# Patient Record
Sex: FEMALE | Race: WHITE | NOT HISPANIC OR LATINO | Employment: OTHER | URBAN - NONMETROPOLITAN AREA
[De-identification: names, ages, dates, MRNs, and addresses within clinical notes are randomized per-mention and may not be internally consistent; named-entity substitution may affect disease eponyms.]

---

## 2017-01-30 ENCOUNTER — TRANSCRIBE ORDERS (OUTPATIENT)
Dept: LAB | Facility: HOSPITAL | Age: 57
End: 2017-01-30

## 2017-01-30 ENCOUNTER — LAB (OUTPATIENT)
Dept: LAB | Facility: HOSPITAL | Age: 57
End: 2017-01-30

## 2017-01-30 DIAGNOSIS — N18.6 TYPE 2 DIABETES MELLITUS WITH ESRD (END-STAGE RENAL DISEASE) (HCC): ICD-10-CM

## 2017-01-30 DIAGNOSIS — E11.22 TYPE 2 DIABETES MELLITUS WITH ESRD (END-STAGE RENAL DISEASE) (HCC): ICD-10-CM

## 2017-01-30 DIAGNOSIS — N18.6 TYPE 2 DIABETES MELLITUS WITH ESRD (END-STAGE RENAL DISEASE) (HCC): Primary | ICD-10-CM

## 2017-01-30 DIAGNOSIS — E11.22 TYPE 2 DIABETES MELLITUS WITH ESRD (END-STAGE RENAL DISEASE) (HCC): Primary | ICD-10-CM

## 2017-01-30 LAB
25(OH)D3 SERPL-MCNC: 39 NG/ML (ref 30–100)
ALBUMIN SERPL-MCNC: 4 G/DL (ref 3.5–5)
ALBUMIN/GLOB SERPL: 1 G/DL (ref 1.1–2.5)
ALP SERPL-CCNC: 80 U/L (ref 24–120)
ALT SERPL W P-5'-P-CCNC: 28 U/L (ref 0–54)
ANION GAP SERPL CALCULATED.3IONS-SCNC: 14 MMOL/L (ref 4–13)
AST SERPL-CCNC: 20 U/L (ref 7–45)
AUTO MIXED CELLS #: 0.6 10*3/UL (ref 0.1–2.6)
AUTO MIXED CELLS %: 4.8 % (ref 0.1–24)
BILIRUB SERPL-MCNC: 0.4 MG/DL (ref 0.1–1)
BUN BLD-MCNC: 41 MG/DL (ref 5–21)
BUN/CREAT SERPL: 22.9
CALCIUM SPEC-SCNC: 9.4 MG/DL (ref 8.4–10.4)
CHLORIDE SERPL-SCNC: 103 MMOL/L (ref 98–110)
CHOLEST SERPL-MCNC: 178 MG/DL (ref 130–200)
CO2 SERPL-SCNC: 26 MMOL/L (ref 24–31)
CREAT BLD-MCNC: 1.79 MG/DL (ref 0.5–1.4)
ERYTHROCYTE [DISTWIDTH] IN BLOOD BY AUTOMATED COUNT: 16.1 % (ref 12–15)
GFR SERPL CREATININE-BSD FRML MDRD: 29 ML/MIN/1.73
GLOBULIN UR ELPH-MCNC: 4.1 GM/DL
GLUCOSE BLD-MCNC: 141 MG/DL (ref 70–100)
HBA1C MFR BLD: 5.9 %
HCT VFR BLD AUTO: 33.9 % (ref 37–47)
HDLC SERPL-MCNC: 36 MG/DL
HGB BLD-MCNC: 11.4 G/DL (ref 12–16)
LDLC SERPL CALC-MCNC: 83 MG/DL (ref 0–99)
LDLC/HDLC SERPL: 2.32 {RATIO}
LYMPHOCYTES # BLD AUTO: 2 10*3/MM3 (ref 0.8–7)
LYMPHOCYTES NFR BLD AUTO: 17.4 % (ref 15–45)
MCH RBC QN AUTO: 30.4 PG (ref 28–32)
MCHC RBC AUTO-ENTMCNC: 33.6 G/DL (ref 33–36)
MCV RBC AUTO: 90.4 FL (ref 82–98)
NEUTROPHILS # BLD AUTO: 9 10*3/MM3 (ref 1.5–8.3)
NEUTROPHILS NFR BLD AUTO: 77.8 % (ref 39–78)
PLATELET # BLD AUTO: 231 10*3/MM3 (ref 130–400)
PMV BLD AUTO: 9 FL (ref 6–12)
POTASSIUM BLD-SCNC: 4.1 MMOL/L (ref 3.5–5.3)
PROT SERPL-MCNC: 8.1 G/DL (ref 6.3–8.7)
RBC # BLD AUTO: 3.75 10*6/MM3 (ref 4.2–5.4)
SODIUM BLD-SCNC: 143 MMOL/L (ref 135–145)
TRIGL SERPL-MCNC: 293 MG/DL (ref 0–149)
TSH SERPL DL<=0.05 MIU/L-ACNC: 4.06 MIU/ML (ref 0.47–4.68)
VIT B12 BLD-MCNC: 718 PG/ML (ref 239–931)
VLDLC SERPL-MCNC: 58.6 MG/DL
WBC NRBC COR # BLD: 11.6 10*3/MM3 (ref 4.8–10.8)

## 2017-01-30 PROCEDURE — 36415 COLL VENOUS BLD VENIPUNCTURE: CPT

## 2017-01-30 PROCEDURE — 80053 COMPREHEN METABOLIC PANEL: CPT

## 2017-01-30 PROCEDURE — 82306 VITAMIN D 25 HYDROXY: CPT | Performed by: INTERNAL MEDICINE

## 2017-01-30 PROCEDURE — 84156 ASSAY OF PROTEIN URINE: CPT | Performed by: INTERNAL MEDICINE

## 2017-01-30 PROCEDURE — 85025 COMPLETE CBC W/AUTO DIFF WBC: CPT

## 2017-01-30 PROCEDURE — 83036 HEMOGLOBIN GLYCOSYLATED A1C: CPT

## 2017-01-30 PROCEDURE — 82043 UR ALBUMIN QUANTITATIVE: CPT | Performed by: INTERNAL MEDICINE

## 2017-01-30 PROCEDURE — 82570 ASSAY OF URINE CREATININE: CPT | Performed by: INTERNAL MEDICINE

## 2017-01-30 PROCEDURE — 80061 LIPID PANEL: CPT

## 2017-01-30 PROCEDURE — 84443 ASSAY THYROID STIM HORMONE: CPT | Performed by: INTERNAL MEDICINE

## 2017-01-30 PROCEDURE — 82607 VITAMIN B-12: CPT | Performed by: INTERNAL MEDICINE

## 2017-01-31 LAB
CREAT 24H UR-MCNC: 89.1 MG/DL
CREAT UR-MCNC: 83.8 MG/DL
MICROALB/CRT. RATIO UR: 796.6 MG/G CREAT (ref 0–30)
MICROALBUMIN UR-MCNC: 709.8 UG/ML
PROT UR-MCNC: NORMAL MG/DL (ref 0–13.5)
PROT/CREAT UR: NORMAL MG/G CREA

## 2017-02-06 ENCOUNTER — OFFICE VISIT (OUTPATIENT)
Dept: ENDOCRINOLOGY | Facility: CLINIC | Age: 57
End: 2017-02-06

## 2017-02-06 VITALS
DIASTOLIC BLOOD PRESSURE: 60 MMHG | SYSTOLIC BLOOD PRESSURE: 110 MMHG | WEIGHT: 293 LBS | BODY MASS INDEX: 47.09 KG/M2 | HEIGHT: 66 IN | HEART RATE: 87 BPM

## 2017-02-06 DIAGNOSIS — E11.59 HYPERTENSION ASSOCIATED WITH DIABETES (HCC): ICD-10-CM

## 2017-02-06 DIAGNOSIS — N18.30 TYPE 2 DIABETES MELLITUS WITH STAGE 3 CHRONIC KIDNEY DISEASE, WITH LONG-TERM CURRENT USE OF INSULIN (HCC): Primary | ICD-10-CM

## 2017-02-06 DIAGNOSIS — E11.69 MIXED DIABETIC HYPERLIPIDEMIA ASSOCIATED WITH TYPE 2 DIABETES MELLITUS (HCC): ICD-10-CM

## 2017-02-06 DIAGNOSIS — E78.2 MIXED DIABETIC HYPERLIPIDEMIA ASSOCIATED WITH TYPE 2 DIABETES MELLITUS (HCC): ICD-10-CM

## 2017-02-06 DIAGNOSIS — E11.22 TYPE 2 DIABETES MELLITUS WITH STAGE 3 CHRONIC KIDNEY DISEASE, WITH LONG-TERM CURRENT USE OF INSULIN (HCC): Primary | ICD-10-CM

## 2017-02-06 DIAGNOSIS — E53.8 B12 DEFICIENCY: ICD-10-CM

## 2017-02-06 DIAGNOSIS — Z79.4 TYPE 2 DIABETES MELLITUS WITH STAGE 3 CHRONIC KIDNEY DISEASE, WITH LONG-TERM CURRENT USE OF INSULIN (HCC): Primary | ICD-10-CM

## 2017-02-06 DIAGNOSIS — E03.9 ACQUIRED HYPOTHYROIDISM: ICD-10-CM

## 2017-02-06 DIAGNOSIS — E55.9 VITAMIN D DEFICIENCY: ICD-10-CM

## 2017-02-06 DIAGNOSIS — I15.2 HYPERTENSION ASSOCIATED WITH DIABETES (HCC): ICD-10-CM

## 2017-02-06 LAB — GLUCOSE BLDC GLUCOMTR-MCNC: 124 MG/DL (ref 70–130)

## 2017-02-06 PROCEDURE — 82962 GLUCOSE BLOOD TEST: CPT | Performed by: INTERNAL MEDICINE

## 2017-02-06 PROCEDURE — 99214 OFFICE O/P EST MOD 30 MIN: CPT | Performed by: INTERNAL MEDICINE

## 2017-02-06 RX ORDER — LEVOTHYROXINE SODIUM 0.03 MG/1
25 TABLET ORAL DAILY
Qty: 30 TABLET | Refills: 11 | Status: SHIPPED | OUTPATIENT
Start: 2017-02-06 | End: 2017-02-07

## 2017-02-06 NOTE — PROGRESS NOTES
Maria C Shrestha is a 56 y.o. female who presents for  evaluation of   Chief Complaint   Patient presents with   • Diabetes         Primary Care / Referring Provider  Ole Soliman MD    History of Present Illness  Duration/Timing:  Diabetes mellitus type 2  timing constant    quality controlled    severity high     Severity (Complications/Hospitalizations)  Secondary Microvascular Complications:  Diabetic Nephropathy, No Diabetic Neuropathy      Context  Diabetes Regimen:  Insulin, Compliant with regimen  Blood Glucose Readings  near goal   Diet  counts carbs, eating only 45 g cho per meal   Exercise:  Does not exercise    Associated Signs/Symptoms  Hyperglycemic Symptoms:  No polyuria, No polydipsia, No polyphagia, Weight loss  Hypoglycemic Episodes:  No documented hypoglycemia    Past Medical History   Diagnosis Date   • Acquired hypothyroidism 2/6/2017   • Allergic    • Anxiety    • Arthritis    • Chronic kidney disease    • Depression    • Disease of thyroid gland    • Dyslipidemia    • Elevated cholesterol    • Essential hypertension    • Gallbladder abscess    • Obesity    • Type 2 diabetes mellitus    • Type II diabetes mellitus, uncontrolled      Family History   Problem Relation Age of Onset   • Diabetes Other    • Cancer Mother    • Cancer Father    • Heart disease Father    • Diabetes Father    • Obesity Father    • Stroke Father    • Cancer Maternal Grandmother    • Diabetes Maternal Grandfather    • Cancer Paternal Grandmother    • Diabetes Paternal Grandfather    • Heart disease Paternal Grandfather      Social History   Substance Use Topics   • Smoking status: Never Smoker   • Smokeless tobacco: Never Used   • Alcohol use No         Current Outpatient Prescriptions:   •  allopurinol (ZYLOPRIM) 100 MG tablet, Take 100 mg by mouth 2 (Two) Times a Day., Disp: , Rfl:   •  amLODIPine (NORVASC) 5 MG tablet, Take 5 mg by mouth Daily., Disp: , Rfl:   •  aspirin 81 MG tablet, Take 81 mg by mouth Daily., Disp:  ", Rfl:   •  busPIRone (BUSPAR) 10 MG tablet, Take 10 mg by mouth 2 (Two) Times a Day., Disp: , Rfl:   •  butalbital-acetaminophen-caffeine (FIORICET, ESGIC) -40 MG per tablet, Take 1 tablet by mouth As Needed for headaches., Disp: , Rfl:   •  calcitriol (ROCALTROL) 0.25 MCG capsule, Take 0.25 mcg by mouth 3 (Three) Times a Week., Disp: , Rfl:   •  Calcium Carb-Cholecalciferol (CALCIUM-VITAMIN D3) 600-400 MG-UNIT tablet, Take 1 tablet by mouth 2 (Two) Times a Day., Disp: , Rfl:   •  carvedilol (COREG) 3.125 MG tablet, Take 3.125 mg by mouth 2 (Two) Times a Day., Disp: , Rfl:   •  cetirizine (zyrTEC) 10 MG tablet, Take 10 mg by mouth Daily., Disp: , Rfl:   •  cholecalciferol (VITAMIN D3) 10644 UNITS capsule, Take 50,000 Units by mouth Every 7 (Seven) Days., Disp: , Rfl:   •  citalopram (CeleXA) 20 MG tablet, Take 40 mg by mouth Daily., Disp: , Rfl:   •  Dulaglutide (TRULICITY) 1.5 MG/0.5ML solution pen-injector, Inject 0.5 mL under the skin Every 7 (Seven) Days., Disp: , Rfl:   •  ferrous sulfate 325 (65 FE) MG tablet, Take 1 tablet by mouth Daily With Breakfast., Disp: 60 tablet, Rfl: 3  •  fluticasone (FLONASE) 50 MCG/ACT nasal spray, 1 spray into each nostril As Needed for allergies., Disp: , Rfl:   •  furosemide (LASIX) 40 MG tablet, Take 1 tablet by mouth Daily., Disp: 90 tablet, Rfl: 3  •  insulin regular (HUMULIN R) 500 UNIT/ML CONCENTRATED injection, Inject 15-25 Units under the skin 3 (Three) Times a Day Before Meals., Disp: , Rfl:   •  Insulin Syringe-Needle U-100 31G X 5/16\" 0.3 ML misc, 4 (Four) Times a Day. use as indicated 4 times daily., Disp: , Rfl:   •  lamoTRIgine (LaMICtal) 50 MG tablet dispersible disintegrating tablet, Take 50 mg by mouth Daily., Disp: , Rfl:   •  LANTUS 100 UNIT/ML injection, INJECT 192 UNITS DAILY, Disp: 180 mL, Rfl: 3  •  levothyroxine (LEVOTHROID) 25 MCG tablet, Take 1 tablet by mouth Daily., Disp: 30 tablet, Rfl: 11  •  lisinopril (PRINIVIL,ZESTRIL) 20 MG tablet, Take " 20 mg by mouth 2 (Two) Times a Day., Disp: , Rfl:   •  Multiple Vitamins-Minerals (MULTIVITAMIN ADULT PO), Take 1 tablet by mouth Daily., Disp: , Rfl:   •  potassium gluconate 595 MG tablet tablet, Take 595 mg by mouth 2 (Two) Times a Day., Disp: , Rfl:   •  rosuvastatin (CRESTOR) 10 MG tablet, Take 10 mg by mouth Every Other Day., Disp: , Rfl:   •  sodium bicarbonate 650 MG tablet, Take 650 mg by mouth 3 (Three) Times a Day., Disp: , Rfl:   •  SODIUM FLUORIDE PO, Take  by mouth. sodium fluoride mucous membrane, Disp: , Rfl:     Review of Systems    Review of Systems   Constitutional: Positive for unexpected weight change. Negative for activity change, appetite change, chills, diaphoresis, fatigue and fever.   HENT: Negative for congestion, drooling, ear discharge, ear pain, facial swelling, mouth sores, nosebleeds, postnasal drip, sinus pressure, sneezing, sore throat, tinnitus, trouble swallowing and voice change.    Eyes: Negative.  Negative for photophobia, pain, discharge, redness and itching.   Respiratory: Negative.  Negative for apnea, cough, choking, chest tightness, shortness of breath, wheezing and stridor.    Cardiovascular: Negative.  Negative for chest pain, palpitations and leg swelling.   Gastrointestinal: Negative.  Negative for abdominal distention, abdominal pain, constipation, diarrhea, nausea and vomiting.   Endocrine: Positive for polydipsia, polyphagia and polyuria. Negative for cold intolerance and heat intolerance.   Genitourinary: Negative for difficulty urinating, dysuria, flank pain and frequency.   Musculoskeletal: Positive for arthralgias, back pain and myalgias. Negative for gait problem, joint swelling, neck pain and neck stiffness.   Skin: Negative for color change, pallor, rash and wound.   Allergic/Immunologic: Negative for environmental allergies, food allergies and immunocompromised state.   Neurological: Negative for dizziness, tremors, seizures, syncope, facial asymmetry,  "speech difficulty, weakness, light-headedness, numbness and headaches.   Hematological: Negative for adenopathy. Does not bruise/bleed easily.   Psychiatric/Behavioral: Negative for agitation, behavioral problems, confusion, decreased concentration, dysphoric mood, hallucinations, self-injury, sleep disturbance and suicidal ideas. The patient is not nervous/anxious and is not hyperactive.         Objective:     Visit Vitals   • /60   • Pulse 87   • Ht 66\" (167.6 cm)   • Wt (!) 418 lb 1.6 oz (190 kg)   • LMP  (LMP Unknown)   • BMI 67.48 kg/m2       Physical Exam   Constitutional: She is oriented to person, place, and time. She appears well-developed.   HENT:   Head: Normocephalic.   Right Ear: External ear normal.   Left Ear: External ear normal.   Nose: Nose normal.   Eyes: Conjunctivae and EOM are normal. No scleral icterus.   Neck: Normal range of motion. Neck supple. No tracheal deviation present. No thyromegaly present.   Cardiovascular: Normal rate, regular rhythm, normal heart sounds and intact distal pulses.  Exam reveals no gallop and no friction rub.    No murmur heard.  Pulmonary/Chest: Effort normal and breath sounds normal. No stridor. No respiratory distress. She has no wheezes. She has no rales. She exhibits no tenderness.   Abdominal: Soft. Bowel sounds are normal. She exhibits no distension and no mass. There is no tenderness. There is no rebound and no guarding.   Musculoskeletal: Normal range of motion. She exhibits no tenderness or deformity.   Lymphadenopathy:     She has no cervical adenopathy.   Neurological: She is alert and oriented to person, place, and time. She displays normal reflexes. She exhibits normal muscle tone. Coordination normal.   Skin: No rash noted. No erythema. No pallor.   Psychiatric: She has a normal mood and affect. Her behavior is normal. Judgment and thought content normal.       Lab Review    Results for orders placed or performed in visit on 02/06/17   POC " Glucose Fingerstick   Result Value Ref Range    Glucose 124 70 - 130 mg/dL           Assessment/Plan       ICD-10-CM ICD-9-CM   1. Type 2 diabetes mellitus with stage 3 chronic kidney disease, with long-term current use of insulin E11.22 250.40    N18.3 585.3    Z79.4 V58.67   2. Mixed diabetic hyperlipidemia associated with type 2 diabetes mellitus E11.69 250.80    E78.2 272.2   3. Hypertension associated with diabetes E11.59 250.80    I10 401.9   4. Vitamin D deficiency E55.9 268.9   5. B12 deficiency E53.8 266.2   6. Acquired hypothyroidism E03.9 244.9       Glycemic Management:   Lab Results   Component Value Date    HGBA1C 5.9 01/30/2017    HGBA1C 6.0 10/19/2016    HGBA1C 6.0 07/14/2016     Lab Results   Component Value Date    GLUCOSE 141 (H) 01/30/2017    BUN 41 (H) 01/30/2017    CREATININE 1.79 (H) 01/30/2017    EGFRIFNONA 29 (L) 01/30/2017    BCR 22.9 01/30/2017    CO2 26.0 01/30/2017    CALCIUM 9.4 01/30/2017    ALBUMIN 4.00 01/30/2017    LABIL2 1.0 (L) 01/30/2017    AST 20 01/30/2017    ALT 28 01/30/2017     Lab Results   Component Value Date    WBC 11.60 (H) 01/30/2017    HGB 11.4 (L) 01/30/2017    HCT 33.9 (L) 01/30/2017    MCV 90.4 01/30/2017     01/30/2017       Trulicity 1.5 mg weekly       Humulin U 500     for bkfast 0.18-20   For lunch and supper 0.08 to 0.15     May take lantus up to 80 u [facility]      Lipid Management    Lab Results   Component Value Date    TRIG 293 (H) 01/30/2017    TRIG 225 (H) 10/19/2016    TRIG 266 (H) 07/14/2016     Lab Results   Component Value Date    HDL 36 (L) 01/30/2017    HDL 38 (L) 10/19/2016    HDL 39 07/14/2016     Lab Results   Component Value Date    LDLCALC 83 01/30/2017    LDLCALC 80 10/19/2016     Lab Results   Component Value Date    LDL 79 07/14/2016    LDL 90 04/14/2016       on Crestor 20 mg daily       Blood Pressure Management:    Vitals:    02/06/17 1705   BP: 110/60   Pulse:          Lisinopril 20 mg twice daily   Amlodipine 5 mg qd  Coreg 3.125 mg  bid   Lasix 40 mg daily     If she exerts bp rises    When she walked in 150-100  5 minutes later 110-60         Microvascular Complication Monitoring:  No Microalbuminuria, No Diabetic Retinopathy, Date of last eye exam 07/18/2016, No Diabetic Neuropathy  on ACE i     ckd stage III-IV    followed by nephrology     --    eye exam in Dec 15 no            Immunizations:  Last pneumococcal immunization pneumovax before age 65     Flu Shot will have in Mid Nov 2016       Preventive Care:  No smoking      Weight Related:   Wt Readings from Last 3 Encounters:   02/06/17 (!) 418 lb 1.6 oz (190 kg)   12/12/16 (!) 416 lb 9.6 oz (189 kg)   12/05/16 (!) 417 lb 12.8 oz (190 kg)     Body mass index is 67.48 kg/(m^2).      prefers no bariatric surgery    Now on cpap       Bone Health    Lab Results   Component Value Date    CALCIUM 9.4 01/30/2017     On rocatrol per nephro for JARETH       Thyroid Health  Lab Results   Component Value Date    TSH 4.060 01/30/2017     On levothyroxine 25 µg daily , increase to 50 ug daily    Other Diabetes Related Aspects       Lab Results   Component Value Date    GRSCGAPQ96 718 01/30/2017           I reviewed and summarized records from Ole Soliman MD from 2016 and I reviewed / ordered labs.     Orders Placed This Encounter   Procedures   • CBC Auto Differential   • Comprehensive Metabolic Panel   • Hemoglobin A1c   • Lipid Panel   • Microalbumin / Creatinine Urine Ratio   • TSH   • Vitamin B12   • Vitamin D 25 Hydroxy   • POC Glucose Fingerstick         A copy of my note was sent to Ole Soliman MD    Please see my above opinion and suggestions.

## 2017-02-07 RX ORDER — LEVOTHYROXINE SODIUM 0.05 MG/1
50 TABLET ORAL DAILY
Qty: 90 TABLET | Refills: 3 | Status: SHIPPED | OUTPATIENT
Start: 2017-02-07 | End: 2018-03-03 | Stop reason: SDUPTHER

## 2017-02-07 RX ORDER — CALCIUM CARB/VITAMIN D3/VIT K1 500-100-40
TABLET,CHEWABLE ORAL
Qty: 120 EACH | Refills: 11 | Status: SHIPPED | OUTPATIENT
Start: 2017-02-07 | End: 2020-01-23

## 2017-02-07 RX ORDER — INSULIN GLARGINE 100 [IU]/ML
INJECTION, SOLUTION SUBCUTANEOUS
Qty: 9 EACH | Refills: 3 | Status: SHIPPED | OUTPATIENT
Start: 2017-02-07 | End: 2017-10-04 | Stop reason: SDUPTHER

## 2017-02-07 RX ORDER — BLOOD SUGAR DIAGNOSTIC
STRIP MISCELLANEOUS
Qty: 120 EACH | Refills: 11 | Status: SHIPPED | OUTPATIENT
Start: 2017-02-07 | End: 2018-05-23 | Stop reason: SDUPTHER

## 2017-02-07 RX ORDER — BLOOD-GLUCOSE METER
KIT MISCELLANEOUS
Qty: 120 EACH | Refills: 11 | Status: SHIPPED | OUTPATIENT
Start: 2017-02-07 | End: 2018-06-13 | Stop reason: SDUPTHER

## 2017-02-07 RX ORDER — GLUCOSAMINE HCL/CHONDROITIN SU 500-400 MG
CAPSULE ORAL
Qty: 120 EACH | Refills: 11 | Status: SHIPPED | OUTPATIENT
Start: 2017-02-07 | End: 2018-03-15 | Stop reason: SDUPTHER

## 2017-02-07 RX ORDER — INSULIN GLARGINE 100 [IU]/ML
INJECTION, SOLUTION SUBCUTANEOUS
Qty: 9 EACH | Refills: 3 | Status: SHIPPED | OUTPATIENT
Start: 2017-02-07 | End: 2017-02-07 | Stop reason: SDUPTHER

## 2017-02-07 RX ORDER — LEVOTHYROXINE SODIUM 0.05 MG/1
50 TABLET ORAL DAILY
Qty: 90 TABLET | Refills: 3 | Status: SHIPPED | OUTPATIENT
Start: 2017-02-07 | End: 2017-02-07 | Stop reason: SDUPTHER

## 2017-02-22 ENCOUNTER — OFFICE VISIT (OUTPATIENT)
Dept: GASTROENTEROLOGY | Facility: CLINIC | Age: 57
End: 2017-02-22

## 2017-02-22 VITALS
SYSTOLIC BLOOD PRESSURE: 124 MMHG | HEART RATE: 88 BPM | OXYGEN SATURATION: 99 % | WEIGHT: 293 LBS | DIASTOLIC BLOOD PRESSURE: 60 MMHG | HEIGHT: 66 IN | BODY MASS INDEX: 47.09 KG/M2 | TEMPERATURE: 99.1 F

## 2017-02-22 DIAGNOSIS — Z79.4 CONTROLLED TYPE 2 DIABETES MELLITUS WITH COMPLICATION, WITH LONG-TERM CURRENT USE OF INSULIN (HCC): ICD-10-CM

## 2017-02-22 DIAGNOSIS — K62.5 BRBPR (BRIGHT RED BLOOD PER RECTUM): Primary | ICD-10-CM

## 2017-02-22 DIAGNOSIS — E11.8 CONTROLLED TYPE 2 DIABETES MELLITUS WITH COMPLICATION, WITH LONG-TERM CURRENT USE OF INSULIN (HCC): ICD-10-CM

## 2017-02-22 DIAGNOSIS — I10 ESSENTIAL HYPERTENSION: ICD-10-CM

## 2017-02-22 DIAGNOSIS — Z80.0 FAMILY HX OF COLON CANCER: ICD-10-CM

## 2017-02-22 DIAGNOSIS — Z86.010 HX OF COLONIC POLYP: ICD-10-CM

## 2017-02-22 PROCEDURE — 99214 OFFICE O/P EST MOD 30 MIN: CPT | Performed by: NURSE PRACTITIONER

## 2017-02-22 NOTE — PROGRESS NOTES
[facility] GASTROENTEROLOGY - OFFICE NOTE    2/22/2017    Maria C Shrestha   1960    Chief Complaint   Patient presents with   • Hematochezia     chronic    pcp dr nguyen       HISTORY OF PRESENT ILLNESS    Maria C Shrestha is a 56 y.o. female presents  with bright blood per rectum.  This is chronic.  This is been going on for several years.  This occurs occasionally.  The blood was a small amount.  She complains of history of hemorrhoids.  She is moving her bowels once to twice a day she has been found to be iron deficient anemia over the last 2 years.  States her hemoglobin usually runs anywhere from 9-10 has been on oral iron over the last 3-4 months she does see a hematologist but cannot think of his name at this time.  Her hematologist is here [place].  I do see a future appointment with Dr. Melo  for March 2017.  She does have history of stage III kidney disease as well and is followed by Dr. Davis.  Her last colonoscopy was January 2014 for history of polyps and intermittent bright red blood per rectum.  She had 4 polyps, and apparent inflamed anal papilla.  It was suspected that her anal papilla and hemorrhoids were the source of her intermittent bright red blood per rectum.  Recommendation 3 years for repeat anoscopy.      Past Medical History   Diagnosis Date   • Acquired hypothyroidism 2/6/2017   • Allergic    • Anemia    • Anxiety    • Arthritis    • Chronic kidney disease      3rd stage   • Depression    • Disease of thyroid gland    • Dyslipidemia    • Elevated cholesterol    • Essential hypertension    • Gallbladder abscess    • Obesity    • Type 2 diabetes mellitus    • Type II diabetes mellitus, uncontrolled        Past Surgical History   Procedure Laterality Date   • Cholecystectomy     • Tonsillectomy     • Adenoidectomy     • Colonoscopy  01/02/2014   • Dilatation and curettage       X 2   • Endoscopy         Outpatient Prescriptions Marked as Taking for the 2/22/17 encounter (Office Visit)  "with FLOR Back   Medication Sig Dispense Refill   • allopurinol (ZYLOPRIM) 100 MG tablet Take 100 mg by mouth 2 (Two) Times a Day.     • amLODIPine (NORVASC) 5 MG tablet Take 5 mg by mouth Daily.     • aspirin 81 MG tablet Take 81 mg by mouth Daily.     • B-D ULTRA-FINE 33 LANCETS misc Use 4 x daily 120 each 11   • busPIRone (BUSPAR) 10 MG tablet Take 10 mg by mouth 2 (Two) Times a Day.     • butalbital-acetaminophen-caffeine (FIORICET, ESGIC) -40 MG per tablet Take 1 tablet by mouth As Needed for headaches.     • calcitriol (ROCALTROL) 0.25 MCG capsule Take 0.25 mcg by mouth 3 (Three) Times a Week.     • Calcium Carb-Cholecalciferol (CALCIUM-VITAMIN D3) 600-400 MG-UNIT tablet Take 1 tablet by mouth 2 (Two) Times a Day.     • carvedilol (COREG) 3.125 MG tablet Take 3.125 mg by mouth 2 (Two) Times a Day.     • cetirizine (zyrTEC) 10 MG tablet Take 10 mg by mouth As Needed.     • cholecalciferol (VITAMIN D3) 11923 UNITS capsule Take 50,000 Units by mouth Every 7 (Seven) Days.     • citalopram (CeleXA) 20 MG tablet Take 40 mg by mouth Daily.     • Dulaglutide (TRULICITY) 1.5 MG/0.5ML solution pen-injector Inject 1.5 mg under the skin Every 7 (Seven) Days. 12 pen 3   • ferrous sulfate 325 (65 FE) MG tablet Take 1 tablet by mouth Daily With Breakfast. 60 tablet 3   • furosemide (LASIX) 40 MG tablet Take 1 tablet by mouth Daily. 90 tablet 3   • Glucose Blood (BLOOD GLUCOSE TEST) strip Use 4 x daily, use any brand covered by insurance or same brand as before 120 each 11   • insulin glargine (LANTUS) 100 UNIT/ML injection Inject up to 100 u daily 9 each 3   • insulin regular (HUMULIN R) 500 UNIT/ML CONCENTRATED injection Use up to 0.30 ml four times daily 5 vial 3   • Insulin Syringe 31G X 5/16\" 1 ML misc 4 x daily 120 each 11   • Insulin Syringe-Needle U-100 31G X 5/16\" 0.3 ML misc use as indicated 4 times daily. 120 each 11   • lamoTRIgine (LaMICtal) 50 MG tablet dispersible disintegrating tablet Take 50 " mg by mouth Daily.     • levothyroxine (SYNTHROID) 50 MCG tablet Take 1 tablet by mouth Daily. 90 tablet 3   • lisinopril (PRINIVIL,ZESTRIL) 20 MG tablet Take 20 mg by mouth 2 (Two) Times a Day.     • Multiple Vitamins-Minerals (MULTIVITAMIN ADULT PO) Take 1 tablet by mouth Daily.     • potassium gluconate 595 MG tablet tablet Take 595 mg by mouth 2 (Two) Times a Day.     • rosuvastatin (CRESTOR) 10 MG tablet Take 10 mg by mouth Every Other Day.     • sodium bicarbonate 650 MG tablet Take 650 mg by mouth 3 (Three) Times a Day.         Allergies   Allergen Reactions   • Codeine Nausea Only       Social History     Social History   • Marital status:      Spouse name: N/A   • Number of children: N/A   • Years of education: N/A     Occupational History   • Not on file.     Social History Main Topics   • Smoking status: Never Smoker   • Smokeless tobacco: Never Used   • Alcohol use No   • Drug use: No   • Sexual activity: Not on file     Other Topics Concern   • Not on file     Social History Narrative       Family History   Problem Relation Age of Onset   • Diabetes Other    • Cancer Mother    • Cancer Father    • Heart disease Father    • Diabetes Father    • Obesity Father    • Stroke Father    • Cancer Maternal Grandmother    • Diabetes Maternal Grandfather    • Cancer Paternal Grandmother    • Colon cancer Paternal Grandmother    • Diabetes Paternal Grandfather    • Heart disease Paternal Grandfather        Review of Systems   Constitutional: Negative for appetite change, chills, fatigue, fever and unexpected weight change.   HENT: Negative for sore throat and trouble swallowing.    Eyes: Negative for pain and visual disturbance.   Respiratory: Positive for shortness of breath (occasional). Negative for cough, chest tightness and wheezing.    Cardiovascular: Negative for chest pain and palpitations.   Gastrointestinal: Negative for abdominal distention, abdominal pain, anal bleeding, blood in stool,  "constipation, diarrhea, nausea, rectal pain and vomiting.        As mentioned in hpi   Endocrine: Negative for cold intolerance and heat intolerance.   Genitourinary: Negative for difficulty urinating, dysuria and hematuria.   Musculoskeletal: Negative for arthralgias and back pain.   Skin: Negative for color change and rash.   Neurological: Positive for headaches. Negative for dizziness, tremors, seizures, syncope and light-headedness.   Hematological: Negative for adenopathy. Does not bruise/bleed easily.   Psychiatric/Behavioral: Negative for confusion. The patient is not nervous/anxious.        Vitals:    02/22/17 1035   BP: 124/60   BP Location: Left arm   Patient Position: Sitting   Cuff Size: Large Adult   Pulse: 88   Temp: 99.1 °F (37.3 °C)   SpO2: 99%   Weight: (!) 403 lb (183 kg)   Height: 65.5\" (166.4 cm)      Body mass index is 66.04 kg/(m^2).    Physical Exam   Constitutional: She is oriented to person, place, and time. She appears well-developed and well-nourished. No distress.   HENT:   Head: Normocephalic and atraumatic.   Mouth/Throat: Oropharynx is clear and moist.   Eyes: Pupils are equal, round, and reactive to light. No scleral icterus.   Neck: Normal range of motion. Neck supple. No JVD present. No tracheal deviation present.   Cardiovascular: Normal rate, regular rhythm, normal heart sounds and intact distal pulses.  Exam reveals no gallop and no friction rub.    No murmur heard.  Pulmonary/Chest: Effort normal and breath sounds normal. No stridor. No respiratory distress. She has no wheezes. She has no rales.   Abdominal: Soft. Bowel sounds are normal. She exhibits no distension and no mass. There is no tenderness. There is no rebound and no guarding.   Musculoskeletal: Normal range of motion. She exhibits no edema or deformity.   Neurological: She is alert and oriented to person, place, and time. No cranial nerve deficit.   Skin: Skin is warm and dry. No rash noted.   Psychiatric: She has a " normal mood and affect. Her behavior is normal.   Vitals reviewed.      Results for orders placed or performed in visit on 02/06/17   POC Glucose Fingerstick   Result Value Ref Range    Glucose 124 70 - 130 mg/dL           ASSESSMENT AND PLAN    Maria C was seen today for hematochezia.    Diagnoses and all orders for this visit:    BRBPR (bright red blood per rectum)  -     Case request    Hx of colonic polyp  -     Case request    Family hx of colon cancer  -     Case request    Essential hypertension    Controlled type 2 diabetes mellitus with complication, with long-term current use of insulin  Comments:  has kidney disease due to diabetes.     Other orders  -     polyethylene glycol (GOLYTELY) 236 G solution; Take 4,000 mL by mouth 1 (One) Time for 1 dose. Take as directed per instruction sheet.     in regards to bright red blood per rectum, differential diagnosis discussed.  This is chronic.  Recommend ER if worsening symptoms.  Plan for colonoscopy.  Recommend to take blood pressure medication a.m. of procedure if that is when she normally takes.  Also instructed on how to adjust diabetes medications.      COLONOSCOPY WITH ANESTHESIA (N/A) All risks, benefits, alternatives, and indications of colonoscopy procedure have been discussed with the patient. Risks to include perforation of the colon requiring possible surgery or colostomy, risk of bleeding from biopsies or removal of colon tissue, possibility of missing a colon polyp or cancer, or adverse drug reaction.  Benefits to include the diagnosis and management of disease of the colon and rectum. Alternatives to include barium enema, radiographic evaluation, lab testing or no intervention. Pt verbalizes understanding and agrees.         Body mass index is 66.04 kg/(m^2).       FLOR Schneider    EMR Dragon/transcription disclaimer:  Much of this encounter note is electronic transcription/translation of spoken language to printed text.  The electronic  translation of spoken language may be erroneous, or at times, nonsensical words or phrases may be inadvertently transcribed.  Although I have reviewed the note for such errors, some may still exist.    Results for orders placed or performed in visit on 02/06/17   POC Glucose Fingerstick   Result Value Ref Range    Glucose 124 70 - 130 mg/dL

## 2017-02-26 ENCOUNTER — HOSPITAL ENCOUNTER (INPATIENT)
Age: 57
LOS: 3 days | Discharge: HOME | DRG: 683 | End: 2017-03-01
Attending: EMERGENCY MEDICINE | Admitting: HOSPITALIST
Payer: MEDICARE

## 2017-02-26 ENCOUNTER — APPOINTMENT (OUTPATIENT)
Dept: GENERAL RADIOLOGY | Age: 57
DRG: 683 | End: 2017-02-26
Payer: MEDICARE

## 2017-02-26 DIAGNOSIS — N18.9 ACUTE ON CHRONIC RENAL FAILURE (HCC): Primary | ICD-10-CM

## 2017-02-26 DIAGNOSIS — R11.11 NON-INTRACTABLE VOMITING WITHOUT NAUSEA, UNSPECIFIED VOMITING TYPE: ICD-10-CM

## 2017-02-26 DIAGNOSIS — N17.9 ACUTE ON CHRONIC RENAL FAILURE (HCC): Primary | ICD-10-CM

## 2017-02-26 DIAGNOSIS — E86.0 DEHYDRATION: ICD-10-CM

## 2017-02-26 PROBLEM — I10 HYPERTENSION: Status: ACTIVE | Noted: 2017-02-26

## 2017-02-26 PROBLEM — N18.30 ACUTE RENAL FAILURE SUPERIMPOSED ON STAGE 3 CHRONIC KIDNEY DISEASE (HCC): Status: ACTIVE | Noted: 2017-02-26

## 2017-02-26 PROBLEM — D64.9 ANEMIA: Status: ACTIVE | Noted: 2017-02-26

## 2017-02-26 PROBLEM — E78.5 HYPERLIPIDEMIA: Status: ACTIVE | Noted: 2017-02-26

## 2017-02-26 PROBLEM — E83.51 HYPOCALCEMIA: Status: ACTIVE | Noted: 2017-02-26

## 2017-02-26 PROBLEM — E11.9 TYPE II OR UNSPECIFIED TYPE DIABETES MELLITUS WITHOUT MENTION OF COMPLICATION, NOT STATED AS UNCONTROLLED: Status: ACTIVE | Noted: 2017-02-26

## 2017-02-26 PROBLEM — G47.30 SLEEP APNEA: Status: ACTIVE | Noted: 2017-02-26

## 2017-02-26 PROBLEM — E07.9 THYROID DISEASE: Status: ACTIVE | Noted: 2017-02-26

## 2017-02-26 PROBLEM — F32.A DEPRESSION: Status: ACTIVE | Noted: 2017-02-26

## 2017-02-26 PROBLEM — E11.22 TYPE 2 DM WITH CKD STAGE 3 AND HYPERTENSION (HCC): Status: ACTIVE | Noted: 2017-02-26

## 2017-02-26 PROBLEM — E55.9 VITAMIN D DEFICIENCY: Status: ACTIVE | Noted: 2017-02-26

## 2017-02-26 PROBLEM — I12.9 TYPE 2 DM WITH CKD STAGE 3 AND HYPERTENSION (HCC): Status: ACTIVE | Noted: 2017-02-26

## 2017-02-26 PROBLEM — F41.9 ANXIETY: Status: ACTIVE | Noted: 2017-02-26

## 2017-02-26 PROBLEM — N18.30 TYPE 2 DM WITH CKD STAGE 3 AND HYPERTENSION (HCC): Status: ACTIVE | Noted: 2017-02-26

## 2017-02-26 LAB
ALBUMIN SERPL-MCNC: 4.1 G/DL (ref 3.5–5.2)
ALP BLD-CCNC: 78 U/L (ref 35–104)
ALT SERPL-CCNC: 21 U/L (ref 5–33)
ANION GAP SERPL CALCULATED.3IONS-SCNC: 18 MMOL/L (ref 7–19)
AST SERPL-CCNC: 19 U/L (ref 5–32)
BILIRUB SERPL-MCNC: <0.2 MG/DL (ref 0.2–1.2)
BILIRUBIN URINE: NEGATIVE
BLOOD, URINE: NEGATIVE
BUN BLDV-MCNC: 77 MG/DL (ref 6–20)
CALCIUM SERPL-MCNC: 8.2 MG/DL (ref 8.6–10)
CHLORIDE BLD-SCNC: 98 MMOL/L (ref 98–111)
CLARITY: CLEAR
CO2: 18 MMOL/L (ref 22–29)
COLOR: YELLOW
CREAT SERPL-MCNC: 3.6 MG/DL (ref 0.5–0.9)
GFR NON-AFRICAN AMERICAN: 13
GLOBULIN: 3.8 G/DL
GLUCOSE BLD-MCNC: 166 MG/DL (ref 74–109)
GLUCOSE BLD-MCNC: 173 MG/DL (ref 70–99)
GLUCOSE BLD-MCNC: 242 MG/DL (ref 70–99)
GLUCOSE URINE: NEGATIVE MG/DL
HCT VFR BLD CALC: 36.2 % (ref 37–47)
HEMOGLOBIN: 11.1 G/DL (ref 12–16)
KETONES, URINE: NEGATIVE MG/DL
LEUKOCYTE ESTERASE, URINE: NEGATIVE
LIPASE: 59 U/L (ref 13–60)
MAGNESIUM: 1.9 MG/DL (ref 1.6–2.6)
MCH RBC QN AUTO: 29.8 PG (ref 27–31)
MCHC RBC AUTO-ENTMCNC: 30.7 G/DL (ref 33–37)
MCV RBC AUTO: 97.3 FL (ref 81–99)
NITRITE, URINE: NEGATIVE
OSMOLALITY URINE: 291 MOSM/KG (ref 250–1200)
OSMOLALITY: 318 MOSM/KG (ref 275–300)
PDW BLD-RTO: 17.4 % (ref 11.5–14.5)
PERFORMED ON: ABNORMAL
PERFORMED ON: ABNORMAL
PERFORMED ON: NORMAL
PH UA: 5
PHOSPHORUS: 4.8 MG/DL (ref 2.5–4.5)
PLATELET # BLD: 239 K/UL (ref 130–400)
PMV BLD AUTO: 10.9 FL (ref 7.4–10.4)
POC TROPONIN I: 0 NG/ML (ref 0–0.08)
POTASSIUM SERPL-SCNC: 4 MMOL/L (ref 3.5–5)
PROTEIN UA: ABNORMAL MG/DL
RBC # BLD: 3.72 M/UL (ref 4.2–5.4)
SODIUM BLD-SCNC: 134 MMOL/L (ref 136–145)
SODIUM URINE: 29 MMOL/L
SPECIFIC GRAVITY UA: 1.01
TOTAL PROTEIN: 7.9 G/DL (ref 6.6–8.7)
UROBILINOGEN, URINE: 0.2 E.U./DL
WBC # BLD: 9.1 K/UL (ref 4.8–10.8)

## 2017-02-26 PROCEDURE — 6360000002 HC RX W HCPCS: Performed by: EMERGENCY MEDICINE

## 2017-02-26 PROCEDURE — S0028 INJECTION, FAMOTIDINE, 20 MG: HCPCS | Performed by: EMERGENCY MEDICINE

## 2017-02-26 PROCEDURE — 99285 EMERGENCY DEPT VISIT HI MDM: CPT

## 2017-02-26 PROCEDURE — 83735 ASSAY OF MAGNESIUM: CPT

## 2017-02-26 PROCEDURE — 74022 RADEX COMPL AQT ABD SERIES: CPT

## 2017-02-26 PROCEDURE — C9113 INJ PANTOPRAZOLE SODIUM, VIA: HCPCS | Performed by: NURSE PRACTITIONER

## 2017-02-26 PROCEDURE — 83690 ASSAY OF LIPASE: CPT

## 2017-02-26 PROCEDURE — 84484 ASSAY OF TROPONIN QUANT: CPT

## 2017-02-26 PROCEDURE — 99223 1ST HOSP IP/OBS HIGH 75: CPT | Performed by: INTERNAL MEDICINE

## 2017-02-26 PROCEDURE — 80053 COMPREHEN METABOLIC PANEL: CPT

## 2017-02-26 PROCEDURE — 2580000003 HC RX 258: Performed by: HOSPITALIST

## 2017-02-26 PROCEDURE — 2580000003 HC RX 258: Performed by: NURSE PRACTITIONER

## 2017-02-26 PROCEDURE — 85027 COMPLETE CBC AUTOMATED: CPT

## 2017-02-26 PROCEDURE — 99284 EMERGENCY DEPT VISIT MOD MDM: CPT | Performed by: EMERGENCY MEDICINE

## 2017-02-26 PROCEDURE — 83930 ASSAY OF BLOOD OSMOLALITY: CPT

## 2017-02-26 PROCEDURE — 81003 URINALYSIS AUTO W/O SCOPE: CPT

## 2017-02-26 PROCEDURE — 6370000000 HC RX 637 (ALT 250 FOR IP): Performed by: NURSE PRACTITIONER

## 2017-02-26 PROCEDURE — 82948 REAGENT STRIP/BLOOD GLUCOSE: CPT

## 2017-02-26 PROCEDURE — 1210000000 HC MED SURG R&B

## 2017-02-26 PROCEDURE — 2500000003 HC RX 250 WO HCPCS: Performed by: EMERGENCY MEDICINE

## 2017-02-26 PROCEDURE — 36415 COLL VENOUS BLD VENIPUNCTURE: CPT

## 2017-02-26 PROCEDURE — 93005 ELECTROCARDIOGRAM TRACING: CPT

## 2017-02-26 PROCEDURE — 83935 ASSAY OF URINE OSMOLALITY: CPT

## 2017-02-26 PROCEDURE — 6360000002 HC RX W HCPCS: Performed by: NURSE PRACTITIONER

## 2017-02-26 PROCEDURE — 84100 ASSAY OF PHOSPHORUS: CPT

## 2017-02-26 PROCEDURE — 84300 ASSAY OF URINE SODIUM: CPT

## 2017-02-26 PROCEDURE — 2580000003 HC RX 258: Performed by: EMERGENCY MEDICINE

## 2017-02-26 RX ORDER — LAMOTRIGINE 25 MG/1
50 TABLET ORAL
Status: DISCONTINUED | OUTPATIENT
Start: 2017-02-26 | End: 2017-03-01 | Stop reason: HOSPADM

## 2017-02-26 RX ORDER — SODIUM BICARBONATE 650 MG/1
650 TABLET ORAL 3 TIMES DAILY
Status: DISCONTINUED | OUTPATIENT
Start: 2017-02-26 | End: 2017-03-01 | Stop reason: HOSPADM

## 2017-02-26 RX ORDER — 0.9 % SODIUM CHLORIDE 0.9 %
1000 INTRAVENOUS SOLUTION INTRAVENOUS ONCE
Status: COMPLETED | OUTPATIENT
Start: 2017-02-26 | End: 2017-02-26

## 2017-02-26 RX ORDER — SODIUM CHLORIDE 9 MG/ML
INJECTION, SOLUTION INTRAVENOUS CONTINUOUS
Status: DISCONTINUED | OUTPATIENT
Start: 2017-02-26 | End: 2017-02-28

## 2017-02-26 RX ORDER — PANTOPRAZOLE SODIUM 40 MG/10ML
40 INJECTION, POWDER, LYOPHILIZED, FOR SOLUTION INTRAVENOUS ONCE
Status: COMPLETED | OUTPATIENT
Start: 2017-02-26 | End: 2017-02-26

## 2017-02-26 RX ORDER — DEXTROSE MONOHYDRATE 50 MG/ML
100 INJECTION, SOLUTION INTRAVENOUS PRN
Status: DISCONTINUED | OUTPATIENT
Start: 2017-02-26 | End: 2017-03-01 | Stop reason: HOSPADM

## 2017-02-26 RX ORDER — ALLOPURINOL 100 MG/1
100 TABLET ORAL 2 TIMES DAILY
COMMUNITY

## 2017-02-26 RX ORDER — FERROUS SULFATE 325(65) MG
325 TABLET ORAL
Status: DISCONTINUED | OUTPATIENT
Start: 2017-02-26 | End: 2017-03-01 | Stop reason: HOSPADM

## 2017-02-26 RX ORDER — LEVOTHYROXINE SODIUM 0.05 MG/1
50 TABLET ORAL DAILY
Status: DISCONTINUED | OUTPATIENT
Start: 2017-02-26 | End: 2017-03-01 | Stop reason: HOSPADM

## 2017-02-26 RX ORDER — ACETAMINOPHEN 325 MG/1
650 TABLET ORAL EVERY 4 HOURS PRN
Status: DISCONTINUED | OUTPATIENT
Start: 2017-02-26 | End: 2017-03-01 | Stop reason: HOSPADM

## 2017-02-26 RX ORDER — FERROUS SULFATE 325(65) MG
325 TABLET ORAL
COMMUNITY
End: 2021-06-16 | Stop reason: ALTCHOICE

## 2017-02-26 RX ORDER — CALCITRIOL 0.25 UG/1
0.25 CAPSULE, LIQUID FILLED ORAL EVERY OTHER DAY
Status: DISCONTINUED | OUTPATIENT
Start: 2017-02-26 | End: 2017-03-01 | Stop reason: HOSPADM

## 2017-02-26 RX ORDER — ASPIRIN 81 MG/1
81 TABLET, CHEWABLE ORAL DAILY
Status: DISCONTINUED | OUTPATIENT
Start: 2017-02-26 | End: 2017-03-01 | Stop reason: HOSPADM

## 2017-02-26 RX ORDER — ONDANSETRON 2 MG/ML
4 INJECTION INTRAMUSCULAR; INTRAVENOUS EVERY 6 HOURS PRN
Status: DISCONTINUED | OUTPATIENT
Start: 2017-02-26 | End: 2017-03-01 | Stop reason: HOSPADM

## 2017-02-26 RX ORDER — INSULIN GLARGINE 100 [IU]/ML
96 INJECTION, SOLUTION SUBCUTANEOUS EVERY EVENING
Status: DISCONTINUED | OUTPATIENT
Start: 2017-02-26 | End: 2017-03-01 | Stop reason: HOSPADM

## 2017-02-26 RX ORDER — CARVEDILOL 6.25 MG/1
3.12 TABLET ORAL 2 TIMES DAILY WITH MEALS
Status: DISCONTINUED | OUTPATIENT
Start: 2017-02-26 | End: 2017-03-01 | Stop reason: HOSPADM

## 2017-02-26 RX ORDER — CALCITRIOL 0.25 UG/1
0.25 CAPSULE, LIQUID FILLED ORAL EVERY OTHER DAY
Status: ON HOLD | COMMUNITY
End: 2021-07-27

## 2017-02-26 RX ORDER — NICOTINE POLACRILEX 4 MG
15 LOZENGE BUCCAL PRN
Status: DISCONTINUED | OUTPATIENT
Start: 2017-02-26 | End: 2017-03-01 | Stop reason: HOSPADM

## 2017-02-26 RX ORDER — MULTIVITAMIN WITH FOLIC ACID 400 MCG
1 TABLET ORAL DAILY
Status: DISCONTINUED | OUTPATIENT
Start: 2017-02-26 | End: 2017-03-01 | Stop reason: HOSPADM

## 2017-02-26 RX ORDER — LEVOTHYROXINE SODIUM 0.05 MG/1
50 TABLET ORAL DAILY
COMMUNITY
End: 2021-06-16 | Stop reason: DRUGHIGH

## 2017-02-26 RX ORDER — AMLODIPINE BESYLATE 5 MG/1
10 TABLET ORAL DAILY
COMMUNITY
End: 2021-07-12

## 2017-02-26 RX ORDER — DEXTROSE MONOHYDRATE 25 G/50ML
12.5 INJECTION, SOLUTION INTRAVENOUS PRN
Status: DISCONTINUED | OUTPATIENT
Start: 2017-02-26 | End: 2017-03-01 | Stop reason: HOSPADM

## 2017-02-26 RX ORDER — SODIUM CHLORIDE 0.9 % (FLUSH) 0.9 %
10 SYRINGE (ML) INJECTION EVERY 12 HOURS SCHEDULED
Status: DISCONTINUED | OUTPATIENT
Start: 2017-02-26 | End: 2017-03-01 | Stop reason: HOSPADM

## 2017-02-26 RX ORDER — ATORVASTATIN CALCIUM 40 MG/1
40 TABLET, FILM COATED ORAL NIGHTLY
Status: DISCONTINUED | OUTPATIENT
Start: 2017-02-26 | End: 2017-03-01 | Stop reason: HOSPADM

## 2017-02-26 RX ORDER — INSULIN GLARGINE 100 [IU]/ML
192 INJECTION, SOLUTION SUBCUTANEOUS EVERY EVENING
Status: DISCONTINUED | OUTPATIENT
Start: 2017-02-26 | End: 2017-02-26 | Stop reason: SDUPTHER

## 2017-02-26 RX ORDER — ERGOCALCIFEROL 1.25 MG/1
50000 CAPSULE ORAL WEEKLY
Status: DISCONTINUED | OUTPATIENT
Start: 2017-02-26 | End: 2017-03-01 | Stop reason: HOSPADM

## 2017-02-26 RX ORDER — ALLOPURINOL 100 MG/1
100 TABLET ORAL 2 TIMES DAILY
Status: DISCONTINUED | OUTPATIENT
Start: 2017-02-26 | End: 2017-03-01 | Stop reason: HOSPADM

## 2017-02-26 RX ORDER — SODIUM CHLORIDE 0.9 % (FLUSH) 0.9 %
10 SYRINGE (ML) INJECTION PRN
Status: DISCONTINUED | OUTPATIENT
Start: 2017-02-26 | End: 2017-03-01 | Stop reason: HOSPADM

## 2017-02-26 RX ORDER — CITALOPRAM 40 MG/1
40 TABLET ORAL DAILY
Status: DISCONTINUED | OUTPATIENT
Start: 2017-02-26 | End: 2017-03-01 | Stop reason: HOSPADM

## 2017-02-26 RX ORDER — ONDANSETRON 2 MG/ML
4 INJECTION INTRAMUSCULAR; INTRAVENOUS ONCE
Status: COMPLETED | OUTPATIENT
Start: 2017-02-26 | End: 2017-02-26

## 2017-02-26 RX ORDER — BUSPIRONE HYDROCHLORIDE 10 MG/1
20 TABLET ORAL 2 TIMES DAILY
Status: DISCONTINUED | OUTPATIENT
Start: 2017-02-26 | End: 2017-03-01 | Stop reason: HOSPADM

## 2017-02-26 RX ORDER — LAMOTRIGINE 100 MG/1
100 TABLET ORAL NIGHTLY
COMMUNITY

## 2017-02-26 RX ADMIN — SODIUM BICARBONATE 650 MG: 650 TABLET, ORALLY DISINTEGRATING ORAL at 15:21

## 2017-02-26 RX ADMIN — BUSPIRONE HYDROCHLORIDE 20 MG: 10 TABLET ORAL at 15:21

## 2017-02-26 RX ADMIN — CALCIUM GLUCONATE 1 G: 94 INJECTION, SOLUTION INTRAVENOUS at 17:50

## 2017-02-26 RX ADMIN — LAMOTRIGINE 50 MG: 25 TABLET ORAL at 17:50

## 2017-02-26 RX ADMIN — ALLOPURINOL 100 MG: 100 TABLET ORAL at 20:31

## 2017-02-26 RX ADMIN — ONDANSETRON 4 MG: 2 INJECTION INTRAMUSCULAR; INTRAVENOUS at 10:52

## 2017-02-26 RX ADMIN — Medication 1 TABLET: at 15:22

## 2017-02-26 RX ADMIN — ASPIRIN 81 MG CHEWABLE TABLET 81 MG: 81 TABLET CHEWABLE at 17:50

## 2017-02-26 RX ADMIN — CITALOPRAM 40 MG: 40 TABLET, FILM COATED ORAL at 15:22

## 2017-02-26 RX ADMIN — ENOXAPARIN SODIUM 40 MG: 40 INJECTION SUBCUTANEOUS at 17:50

## 2017-02-26 RX ADMIN — SODIUM BICARBONATE 650 MG: 650 TABLET, ORALLY DISINTEGRATING ORAL at 20:31

## 2017-02-26 RX ADMIN — SODIUM CHLORIDE: 9 INJECTION, SOLUTION INTRAVENOUS at 15:21

## 2017-02-26 RX ADMIN — INSULIN GLARGINE 96 UNITS: 100 INJECTION, SOLUTION SUBCUTANEOUS at 17:53

## 2017-02-26 RX ADMIN — FERROUS SULFATE TAB 325 MG (65 MG ELEMENTAL FE) 325 MG: 325 (65 FE) TAB at 17:50

## 2017-02-26 RX ADMIN — SODIUM CHLORIDE 1000 ML: 9 INJECTION, SOLUTION INTRAVENOUS at 10:51

## 2017-02-26 RX ADMIN — ALLOPURINOL 100 MG: 100 TABLET ORAL at 15:22

## 2017-02-26 RX ADMIN — ERGOCALCIFEROL 50000 UNITS: 1.25 CAPSULE ORAL at 15:22

## 2017-02-26 RX ADMIN — ATORVASTATIN CALCIUM 40 MG: 40 TABLET, FILM COATED ORAL at 20:31

## 2017-02-26 RX ADMIN — INSULIN GLARGINE 96 UNITS: 100 INJECTION, SOLUTION SUBCUTANEOUS at 17:49

## 2017-02-26 RX ADMIN — LEVOTHYROXINE SODIUM 50 MCG: 50 TABLET ORAL at 15:22

## 2017-02-26 RX ADMIN — THERA TABS 1 TABLET: TAB at 15:21

## 2017-02-26 RX ADMIN — FAMOTIDINE 20 MG: 10 INJECTION, SOLUTION INTRAVENOUS at 10:51

## 2017-02-26 RX ADMIN — CARVEDILOL 3.12 MG: 6.25 TABLET, FILM COATED ORAL at 17:50

## 2017-02-26 RX ADMIN — BUSPIRONE HYDROCHLORIDE 20 MG: 10 TABLET ORAL at 20:31

## 2017-02-26 RX ADMIN — PANTOPRAZOLE SODIUM 40 MG: 40 INJECTION, POWDER, FOR SOLUTION INTRAVENOUS at 15:21

## 2017-02-26 RX ADMIN — Medication 1 TABLET: at 20:31

## 2017-02-26 ASSESSMENT — ENCOUNTER SYMPTOMS
NAUSEA: 1
VOMITING: 1
SHORTNESS OF BREATH: 0
DIARRHEA: 1
ABDOMINAL PAIN: 1
EYE PAIN: 0

## 2017-02-26 ASSESSMENT — PAIN DESCRIPTION - PAIN TYPE: TYPE: ACUTE PAIN

## 2017-02-26 ASSESSMENT — PAIN DESCRIPTION - DESCRIPTORS: DESCRIPTORS: BURNING

## 2017-02-26 ASSESSMENT — PAIN SCALES - GENERAL: PAINLEVEL_OUTOF10: 4

## 2017-02-26 ASSESSMENT — PAIN DESCRIPTION - FREQUENCY: FREQUENCY: CONTINUOUS

## 2017-02-27 LAB
ALBUMIN SERPL-MCNC: 3.3 G/DL (ref 3.5–5.2)
ALP BLD-CCNC: 52 U/L (ref 35–104)
ALT SERPL-CCNC: 14 U/L (ref 5–33)
ANION GAP SERPL CALCULATED.3IONS-SCNC: 16 MMOL/L (ref 7–19)
AST SERPL-CCNC: 12 U/L (ref 5–32)
BASOPHILS ABSOLUTE: 0 K/UL (ref 0–0.2)
BASOPHILS RELATIVE PERCENT: 0.1 % (ref 0–1)
BILIRUB SERPL-MCNC: <0.2 MG/DL (ref 0.2–1.2)
BUN BLDV-MCNC: 74 MG/DL (ref 6–20)
CALCIUM SERPL-MCNC: 7.7 MG/DL (ref 8.6–10)
CHLORIDE BLD-SCNC: 106 MMOL/L (ref 98–111)
CO2: 16 MMOL/L (ref 22–29)
CREAT SERPL-MCNC: 3.3 MG/DL (ref 0.5–0.9)
EOSINOPHILS ABSOLUTE: 0.1 K/UL (ref 0–0.6)
EOSINOPHILS RELATIVE PERCENT: 0.5 % (ref 0–5)
GFR NON-AFRICAN AMERICAN: 14
GLOBULIN: 3 G/DL
GLUCOSE BLD-MCNC: 107 MG/DL (ref 70–99)
GLUCOSE BLD-MCNC: 120 MG/DL (ref 70–99)
GLUCOSE BLD-MCNC: 124 MG/DL (ref 70–99)
GLUCOSE BLD-MCNC: 161 MG/DL (ref 70–99)
GLUCOSE BLD-MCNC: 94 MG/DL (ref 74–109)
HBA1C MFR BLD: 6.1 %
HCT VFR BLD CALC: 29.7 % (ref 37–47)
HEMOGLOBIN: 9.5 G/DL (ref 12–16)
LYMPHOCYTES ABSOLUTE: 1.7 K/UL (ref 1.1–4.5)
LYMPHOCYTES RELATIVE PERCENT: 17.8 % (ref 20–40)
MCH RBC QN AUTO: 29.3 PG (ref 27–31)
MCHC RBC AUTO-ENTMCNC: 32 G/DL (ref 33–37)
MCV RBC AUTO: 91.7 FL (ref 81–99)
MONOCYTES ABSOLUTE: 0.4 K/UL (ref 0–0.9)
MONOCYTES RELATIVE PERCENT: 4.2 % (ref 0–10)
NEUTROPHILS ABSOLUTE: 7.3 K/UL (ref 1.5–7.5)
NEUTROPHILS RELATIVE PERCENT: 76.3 % (ref 50–65)
PDW BLD-RTO: 17.1 % (ref 11.5–14.5)
PERFORMED ON: ABNORMAL
PLATELET # BLD: 229 K/UL (ref 130–400)
PMV BLD AUTO: 10.2 FL (ref 7.4–10.4)
POTASSIUM SERPL-SCNC: 4.2 MMOL/L (ref 3.5–5)
RBC # BLD: 3.24 M/UL (ref 4.2–5.4)
SODIUM BLD-SCNC: 138 MMOL/L (ref 136–145)
TOTAL PROTEIN: 6.3 G/DL (ref 6.6–8.7)
TSH SERPL DL<=0.05 MIU/L-ACNC: 0.7 UIU/ML (ref 0.27–4.2)
WBC # BLD: 9.5 K/UL (ref 4.8–10.8)

## 2017-02-27 PROCEDURE — 82948 REAGENT STRIP/BLOOD GLUCOSE: CPT

## 2017-02-27 PROCEDURE — 99233 SBSQ HOSP IP/OBS HIGH 50: CPT | Performed by: HOSPITALIST

## 2017-02-27 PROCEDURE — 6370000000 HC RX 637 (ALT 250 FOR IP): Performed by: NURSE PRACTITIONER

## 2017-02-27 PROCEDURE — 36415 COLL VENOUS BLD VENIPUNCTURE: CPT

## 2017-02-27 PROCEDURE — 80053 COMPREHEN METABOLIC PANEL: CPT

## 2017-02-27 PROCEDURE — 85025 COMPLETE CBC W/AUTO DIFF WBC: CPT

## 2017-02-27 PROCEDURE — 83036 HEMOGLOBIN GLYCOSYLATED A1C: CPT

## 2017-02-27 PROCEDURE — 2580000003 HC RX 258: Performed by: NURSE PRACTITIONER

## 2017-02-27 PROCEDURE — 1210000000 HC MED SURG R&B

## 2017-02-27 PROCEDURE — 6360000002 HC RX W HCPCS: Performed by: NURSE PRACTITIONER

## 2017-02-27 PROCEDURE — 2580000003 HC RX 258: Performed by: HOSPITALIST

## 2017-02-27 PROCEDURE — 84443 ASSAY THYROID STIM HORMONE: CPT

## 2017-02-27 RX ORDER — DOCUSATE SODIUM 100 MG/1
100 CAPSULE, LIQUID FILLED ORAL DAILY
Status: DISCONTINUED | OUTPATIENT
Start: 2017-02-27 | End: 2017-03-01 | Stop reason: HOSPADM

## 2017-02-27 RX ADMIN — BUSPIRONE HYDROCHLORIDE 20 MG: 10 TABLET ORAL at 09:04

## 2017-02-27 RX ADMIN — BUSPIRONE HYDROCHLORIDE 20 MG: 10 TABLET ORAL at 21:33

## 2017-02-27 RX ADMIN — ENOXAPARIN SODIUM 40 MG: 40 INJECTION SUBCUTANEOUS at 09:04

## 2017-02-27 RX ADMIN — SODIUM BICARBONATE 650 MG: 650 TABLET, ORALLY DISINTEGRATING ORAL at 21:33

## 2017-02-27 RX ADMIN — CARVEDILOL 3.12 MG: 6.25 TABLET, FILM COATED ORAL at 09:01

## 2017-02-27 RX ADMIN — ALLOPURINOL 100 MG: 100 TABLET ORAL at 21:33

## 2017-02-27 RX ADMIN — INSULIN GLARGINE 96 UNITS: 100 INJECTION, SOLUTION SUBCUTANEOUS at 20:05

## 2017-02-27 RX ADMIN — SODIUM BICARBONATE 650 MG: 650 TABLET, ORALLY DISINTEGRATING ORAL at 09:03

## 2017-02-27 RX ADMIN — Medication 198 MG: at 15:00

## 2017-02-27 RX ADMIN — SODIUM BICARBONATE 650 MG: 650 TABLET, ORALLY DISINTEGRATING ORAL at 16:05

## 2017-02-27 RX ADMIN — Medication 1 TABLET: at 21:33

## 2017-02-27 RX ADMIN — FERROUS SULFATE TAB 325 MG (65 MG ELEMENTAL FE) 325 MG: 325 (65 FE) TAB at 18:18

## 2017-02-27 RX ADMIN — CITALOPRAM 40 MG: 40 TABLET, FILM COATED ORAL at 09:03

## 2017-02-27 RX ADMIN — LEVOTHYROXINE SODIUM 50 MCG: 50 TABLET ORAL at 09:03

## 2017-02-27 RX ADMIN — DOCUSATE SODIUM 100 MG: 100 CAPSULE, LIQUID FILLED ORAL at 16:05

## 2017-02-27 RX ADMIN — CARVEDILOL 3.12 MG: 6.25 TABLET, FILM COATED ORAL at 18:16

## 2017-02-27 RX ADMIN — SODIUM CHLORIDE: 9 INJECTION, SOLUTION INTRAVENOUS at 00:53

## 2017-02-27 RX ADMIN — FERROUS SULFATE TAB 325 MG (65 MG ELEMENTAL FE) 325 MG: 325 (65 FE) TAB at 09:04

## 2017-02-27 RX ADMIN — LAMOTRIGINE 50 MG: 25 TABLET ORAL at 18:17

## 2017-02-27 RX ADMIN — Medication 10 ML: at 09:05

## 2017-02-27 RX ADMIN — INSULIN GLARGINE 96 UNITS: 100 INJECTION, SOLUTION SUBCUTANEOUS at 20:06

## 2017-02-27 RX ADMIN — SODIUM CHLORIDE: 9 INJECTION, SOLUTION INTRAVENOUS at 17:00

## 2017-02-27 RX ADMIN — ALLOPURINOL 100 MG: 100 TABLET ORAL at 09:03

## 2017-02-27 RX ADMIN — ATORVASTATIN CALCIUM 40 MG: 40 TABLET, FILM COATED ORAL at 21:33

## 2017-02-28 ENCOUNTER — APPOINTMENT (OUTPATIENT)
Dept: GENERAL RADIOLOGY | Age: 57
DRG: 683 | End: 2017-02-28
Payer: MEDICARE

## 2017-02-28 LAB
ALBUMIN SERPL-MCNC: 3.5 G/DL (ref 3.5–5.2)
ALP BLD-CCNC: 57 U/L (ref 35–104)
ALT SERPL-CCNC: 17 U/L (ref 5–33)
ANION GAP SERPL CALCULATED.3IONS-SCNC: 11 MMOL/L (ref 7–19)
AST SERPL-CCNC: 15 U/L (ref 5–32)
BILIRUB SERPL-MCNC: <0.2 MG/DL (ref 0.2–1.2)
BUN BLDV-MCNC: 55 MG/DL (ref 6–20)
CALCIUM SERPL-MCNC: 8.1 MG/DL (ref 8.6–10)
CHLORIDE BLD-SCNC: 113 MMOL/L (ref 98–111)
CO2: 20 MMOL/L (ref 22–29)
CREAT SERPL-MCNC: 2.2 MG/DL (ref 0.5–0.9)
D DIMER: 2.76 NG/ML DDU (ref 0–0.48)
EKG P AXIS: 7 DEGREES
EKG P-R INTERVAL: 204 MS
EKG Q-T INTERVAL: 456 MS
EKG QRS DURATION: 94 MS
EKG QTC CALCULATION (BAZETT): 475 MS
EKG T AXIS: 36 DEGREES
GFR NON-AFRICAN AMERICAN: 23
GLOBULIN: 2.9 G/DL
GLUCOSE BLD-MCNC: 115 MG/DL (ref 70–99)
GLUCOSE BLD-MCNC: 121 MG/DL (ref 70–99)
GLUCOSE BLD-MCNC: 125 MG/DL (ref 70–99)
GLUCOSE BLD-MCNC: 56 MG/DL (ref 70–99)
GLUCOSE BLD-MCNC: 63 MG/DL (ref 70–99)
GLUCOSE BLD-MCNC: 65 MG/DL (ref 70–99)
GLUCOSE BLD-MCNC: 75 MG/DL (ref 70–99)
GLUCOSE BLD-MCNC: 85 MG/DL (ref 74–109)
GLUCOSE BLD-MCNC: 86 MG/DL (ref 70–99)
GLUCOSE BLD-MCNC: 90 MG/DL (ref 70–99)
MAGNESIUM: 1.9 MG/DL (ref 1.6–2.6)
PERFORMED ON: ABNORMAL
PERFORMED ON: NORMAL
PHOSPHORUS: 2.5 MG/DL (ref 2.5–4.5)
POTASSIUM SERPL-SCNC: 4.3 MMOL/L (ref 3.5–5)
SODIUM BLD-SCNC: 144 MMOL/L (ref 136–145)
TOTAL PROTEIN: 6.4 G/DL (ref 6.6–8.7)
TROPONIN: <0.01 NG/ML (ref 0–0.03)

## 2017-02-28 PROCEDURE — 83735 ASSAY OF MAGNESIUM: CPT

## 2017-02-28 PROCEDURE — 93005 ELECTROCARDIOGRAM TRACING: CPT

## 2017-02-28 PROCEDURE — 6360000002 HC RX W HCPCS: Performed by: NURSE PRACTITIONER

## 2017-02-28 PROCEDURE — 84100 ASSAY OF PHOSPHORUS: CPT

## 2017-02-28 PROCEDURE — 2580000003 HC RX 258: Performed by: NURSE PRACTITIONER

## 2017-02-28 PROCEDURE — 99233 SBSQ HOSP IP/OBS HIGH 50: CPT | Performed by: HOSPITALIST

## 2017-02-28 PROCEDURE — 84484 ASSAY OF TROPONIN QUANT: CPT

## 2017-02-28 PROCEDURE — 1210000000 HC MED SURG R&B

## 2017-02-28 PROCEDURE — 85379 FIBRIN DEGRADATION QUANT: CPT

## 2017-02-28 PROCEDURE — 6370000000 HC RX 637 (ALT 250 FOR IP): Performed by: HOSPITALIST

## 2017-02-28 PROCEDURE — 82948 REAGENT STRIP/BLOOD GLUCOSE: CPT

## 2017-02-28 PROCEDURE — 6370000000 HC RX 637 (ALT 250 FOR IP): Performed by: NURSE PRACTITIONER

## 2017-02-28 PROCEDURE — 2580000003 HC RX 258: Performed by: HOSPITALIST

## 2017-02-28 PROCEDURE — 71010 XR CHEST PORTABLE: CPT

## 2017-02-28 PROCEDURE — 36415 COLL VENOUS BLD VENIPUNCTURE: CPT

## 2017-02-28 PROCEDURE — 80053 COMPREHEN METABOLIC PANEL: CPT

## 2017-02-28 RX ORDER — LORAZEPAM 0.5 MG/1
0.5 TABLET ORAL EVERY 6 HOURS PRN
Status: DISCONTINUED | OUTPATIENT
Start: 2017-02-28 | End: 2017-03-01 | Stop reason: HOSPADM

## 2017-02-28 RX ORDER — NITROGLYCERIN 0.4 MG/1
0.4 TABLET SUBLINGUAL EVERY 5 MIN PRN
Status: DISCONTINUED | OUTPATIENT
Start: 2017-02-28 | End: 2017-03-01 | Stop reason: HOSPADM

## 2017-02-28 RX ADMIN — LAMOTRIGINE 50 MG: 25 TABLET ORAL at 18:23

## 2017-02-28 RX ADMIN — INSULIN GLARGINE 96 UNITS: 100 INJECTION, SOLUTION SUBCUTANEOUS at 18:24

## 2017-02-28 RX ADMIN — ATORVASTATIN CALCIUM 40 MG: 40 TABLET, FILM COATED ORAL at 19:59

## 2017-02-28 RX ADMIN — BUSPIRONE HYDROCHLORIDE 20 MG: 10 TABLET ORAL at 20:00

## 2017-02-28 RX ADMIN — CALCITRIOL 0.25 MCG: 0.25 CAPSULE, LIQUID FILLED ORAL at 19:59

## 2017-02-28 RX ADMIN — Medication 10 ML: at 20:00

## 2017-02-28 RX ADMIN — ENOXAPARIN SODIUM 40 MG: 40 INJECTION SUBCUTANEOUS at 09:13

## 2017-02-28 RX ADMIN — SODIUM BICARBONATE 650 MG: 650 TABLET, ORALLY DISINTEGRATING ORAL at 15:01

## 2017-02-28 RX ADMIN — ALLOPURINOL 100 MG: 100 TABLET ORAL at 20:00

## 2017-02-28 RX ADMIN — ASPIRIN 81 MG CHEWABLE TABLET 81 MG: 81 TABLET CHEWABLE at 20:00

## 2017-02-28 RX ADMIN — FERROUS SULFATE TAB 325 MG (65 MG ELEMENTAL FE) 325 MG: 325 (65 FE) TAB at 17:38

## 2017-02-28 RX ADMIN — CARVEDILOL 3.12 MG: 6.25 TABLET, FILM COATED ORAL at 18:23

## 2017-02-28 RX ADMIN — LORAZEPAM 0.5 MG: 0.5 TABLET ORAL at 18:23

## 2017-02-28 RX ADMIN — Medication 198 MG: at 12:53

## 2017-02-28 RX ADMIN — NITROGLYCERIN 0.4 MG: 0.4 TABLET SUBLINGUAL at 17:38

## 2017-02-28 RX ADMIN — CITALOPRAM 40 MG: 40 TABLET, FILM COATED ORAL at 09:11

## 2017-02-28 RX ADMIN — Medication 1 TABLET: at 09:12

## 2017-02-28 RX ADMIN — SODIUM CHLORIDE: 9 INJECTION, SOLUTION INTRAVENOUS at 12:50

## 2017-02-28 RX ADMIN — SODIUM BICARBONATE 650 MG: 650 TABLET, ORALLY DISINTEGRATING ORAL at 09:11

## 2017-02-28 RX ADMIN — FERROUS SULFATE TAB 325 MG (65 MG ELEMENTAL FE) 325 MG: 325 (65 FE) TAB at 09:13

## 2017-02-28 RX ADMIN — THERA TABS 1 TABLET: TAB at 09:11

## 2017-02-28 RX ADMIN — INSULIN GLARGINE 96 UNITS: 100 INJECTION, SOLUTION SUBCUTANEOUS at 18:33

## 2017-02-28 RX ADMIN — CARVEDILOL 3.12 MG: 6.25 TABLET, FILM COATED ORAL at 09:11

## 2017-02-28 RX ADMIN — Medication 10 ML: at 09:14

## 2017-02-28 RX ADMIN — Medication 1 TABLET: at 19:59

## 2017-02-28 RX ADMIN — ALLOPURINOL 100 MG: 100 TABLET ORAL at 09:12

## 2017-02-28 RX ADMIN — SODIUM BICARBONATE 650 MG: 650 TABLET, ORALLY DISINTEGRATING ORAL at 19:59

## 2017-02-28 RX ADMIN — LEVOTHYROXINE SODIUM 50 MCG: 50 TABLET ORAL at 09:20

## 2017-02-28 RX ADMIN — BUSPIRONE HYDROCHLORIDE 20 MG: 10 TABLET ORAL at 09:12

## 2017-02-28 RX ADMIN — DOCUSATE SODIUM 100 MG: 100 CAPSULE, LIQUID FILLED ORAL at 09:11

## 2017-03-01 VITALS
BODY MASS INDEX: 48.82 KG/M2 | TEMPERATURE: 98.7 F | WEIGHT: 293 LBS | DIASTOLIC BLOOD PRESSURE: 62 MMHG | RESPIRATION RATE: 20 BRPM | HEART RATE: 73 BPM | OXYGEN SATURATION: 93 % | HEIGHT: 65 IN | SYSTOLIC BLOOD PRESSURE: 140 MMHG

## 2017-03-01 LAB
ANION GAP SERPL CALCULATED.3IONS-SCNC: 13 MMOL/L (ref 7–19)
BASOPHILS ABSOLUTE: 0 K/UL (ref 0–0.2)
BASOPHILS RELATIVE PERCENT: 0.2 % (ref 0–1)
BUN BLDV-MCNC: 46 MG/DL (ref 6–20)
CALCIUM SERPL-MCNC: 8.4 MG/DL (ref 8.6–10)
CHLORIDE BLD-SCNC: 112 MMOL/L (ref 98–111)
CO2: 18 MMOL/L (ref 22–29)
CREAT SERPL-MCNC: 1.9 MG/DL (ref 0.5–0.9)
EKG P AXIS: 17 DEGREES
EKG P-R INTERVAL: 188 MS
EKG Q-T INTERVAL: 414 MS
EKG QRS DURATION: 92 MS
EKG QTC CALCULATION (BAZETT): 447 MS
EKG T AXIS: 48 DEGREES
EOSINOPHILS ABSOLUTE: 0.3 K/UL (ref 0–0.6)
EOSINOPHILS RELATIVE PERCENT: 2.2 % (ref 0–5)
GFR NON-AFRICAN AMERICAN: 27
GLUCOSE BLD-MCNC: 109 MG/DL (ref 70–99)
GLUCOSE BLD-MCNC: 123 MG/DL (ref 70–99)
GLUCOSE BLD-MCNC: 59 MG/DL (ref 74–109)
GLUCOSE BLD-MCNC: 68 MG/DL (ref 70–99)
HCT VFR BLD CALC: 30.2 % (ref 37–47)
HEMOGLOBIN: 9.5 G/DL (ref 12–16)
LYMPHOCYTES ABSOLUTE: 2.6 K/UL (ref 1.1–4.5)
LYMPHOCYTES RELATIVE PERCENT: 20 % (ref 20–40)
MCH RBC QN AUTO: 29.4 PG (ref 27–31)
MCHC RBC AUTO-ENTMCNC: 31.5 G/DL (ref 33–37)
MCV RBC AUTO: 93.5 FL (ref 81–99)
MONOCYTES ABSOLUTE: 0.7 K/UL (ref 0–0.9)
MONOCYTES RELATIVE PERCENT: 5.2 % (ref 0–10)
NEUTROPHILS ABSOLUTE: 9 K/UL (ref 1.5–7.5)
NEUTROPHILS RELATIVE PERCENT: 70.7 % (ref 50–65)
PDW BLD-RTO: 17.7 % (ref 11.5–14.5)
PERFORMED ON: ABNORMAL
PLATELET # BLD: 237 K/UL (ref 130–400)
PMV BLD AUTO: 9.6 FL (ref 7.4–10.4)
POTASSIUM SERPL-SCNC: 4.3 MMOL/L (ref 3.5–5)
RBC # BLD: 3.23 M/UL (ref 4.2–5.4)
SODIUM BLD-SCNC: 143 MMOL/L (ref 136–145)
WBC # BLD: 12.8 K/UL (ref 4.8–10.8)

## 2017-03-01 PROCEDURE — 99238 HOSP IP/OBS DSCHRG MGMT 30/<: CPT | Performed by: HOSPITALIST

## 2017-03-01 PROCEDURE — 2580000003 HC RX 258: Performed by: NURSE PRACTITIONER

## 2017-03-01 PROCEDURE — 85025 COMPLETE CBC W/AUTO DIFF WBC: CPT

## 2017-03-01 PROCEDURE — 80048 BASIC METABOLIC PNL TOTAL CA: CPT

## 2017-03-01 PROCEDURE — 36415 COLL VENOUS BLD VENIPUNCTURE: CPT

## 2017-03-01 PROCEDURE — 6360000002 HC RX W HCPCS: Performed by: NURSE PRACTITIONER

## 2017-03-01 PROCEDURE — 82948 REAGENT STRIP/BLOOD GLUCOSE: CPT

## 2017-03-01 PROCEDURE — 6370000000 HC RX 637 (ALT 250 FOR IP): Performed by: NURSE PRACTITIONER

## 2017-03-01 RX ORDER — LISINOPRIL 20 MG/1
20 TABLET ORAL 2 TIMES DAILY
Qty: 1 TABLET | Refills: 0
Start: 2017-03-01 | End: 2021-06-16

## 2017-03-01 RX ORDER — FUROSEMIDE 40 MG/1
40 TABLET ORAL DAILY
Qty: 1 TABLET | Refills: 0
Start: 2017-03-01 | End: 2017-07-12 | Stop reason: ALTCHOICE

## 2017-03-01 RX ADMIN — BUSPIRONE HYDROCHLORIDE 20 MG: 10 TABLET ORAL at 09:12

## 2017-03-01 RX ADMIN — CITALOPRAM 40 MG: 40 TABLET, FILM COATED ORAL at 09:11

## 2017-03-01 RX ADMIN — Medication 10 ML: at 09:13

## 2017-03-01 RX ADMIN — SODIUM BICARBONATE 650 MG: 650 TABLET, ORALLY DISINTEGRATING ORAL at 09:11

## 2017-03-01 RX ADMIN — THERA TABS 1 TABLET: TAB at 09:12

## 2017-03-01 RX ADMIN — LEVOTHYROXINE SODIUM 50 MCG: 50 TABLET ORAL at 06:35

## 2017-03-01 RX ADMIN — Medication 1 TABLET: at 09:12

## 2017-03-01 RX ADMIN — ENOXAPARIN SODIUM 40 MG: 40 INJECTION SUBCUTANEOUS at 09:11

## 2017-03-01 RX ADMIN — CARVEDILOL 3.12 MG: 6.25 TABLET, FILM COATED ORAL at 09:12

## 2017-03-01 RX ADMIN — SODIUM BICARBONATE 650 MG: 650 TABLET, ORALLY DISINTEGRATING ORAL at 15:28

## 2017-03-01 RX ADMIN — FERROUS SULFATE TAB 325 MG (65 MG ELEMENTAL FE) 325 MG: 325 (65 FE) TAB at 06:35

## 2017-03-01 RX ADMIN — Medication 198 MG: at 12:28

## 2017-03-01 RX ADMIN — ALLOPURINOL 100 MG: 100 TABLET ORAL at 09:12

## 2017-03-03 ENCOUNTER — LAB (OUTPATIENT)
Dept: LAB | Facility: HOSPITAL | Age: 57
End: 2017-03-03

## 2017-03-03 ENCOUNTER — TRANSCRIBE ORDERS (OUTPATIENT)
Dept: GENERAL RADIOLOGY | Facility: HOSPITAL | Age: 57
End: 2017-03-03

## 2017-03-03 DIAGNOSIS — N17.9 ACUTE KIDNEY INJURY (HCC): ICD-10-CM

## 2017-03-03 DIAGNOSIS — N17.9 ACUTE KIDNEY INJURY (HCC): Primary | ICD-10-CM

## 2017-03-03 LAB
ANION GAP SERPL CALCULATED.3IONS-SCNC: 8 MMOL/L (ref 4–13)
BUN BLD-MCNC: 34 MG/DL (ref 5–21)
BUN/CREAT SERPL: 19.8
CALCIUM SPEC-SCNC: 9.1 MG/DL (ref 8.4–10.4)
CHLORIDE SERPL-SCNC: 109 MMOL/L (ref 98–110)
CO2 SERPL-SCNC: 25 MMOL/L (ref 24–31)
CREAT BLD-MCNC: 1.72 MG/DL (ref 0.5–1.4)
GFR SERPL CREATININE-BSD FRML MDRD: 31 ML/MIN/1.73
GLUCOSE BLD-MCNC: 69 MG/DL (ref 70–100)
POTASSIUM BLD-SCNC: 5.1 MMOL/L (ref 3.5–5.3)
SODIUM BLD-SCNC: 142 MMOL/L (ref 135–145)

## 2017-03-03 PROCEDURE — 80048 BASIC METABOLIC PNL TOTAL CA: CPT

## 2017-03-03 PROCEDURE — 36415 COLL VENOUS BLD VENIPUNCTURE: CPT

## 2017-03-14 DIAGNOSIS — E11.22 TYPE 2 DIABETES MELLITUS WITH ESRD (END-STAGE RENAL DISEASE) (HCC): Primary | ICD-10-CM

## 2017-03-14 DIAGNOSIS — N18.6 TYPE 2 DIABETES MELLITUS WITH ESRD (END-STAGE RENAL DISEASE) (HCC): Primary | ICD-10-CM

## 2017-03-15 ENCOUNTER — OFFICE VISIT (OUTPATIENT)
Dept: ONCOLOGY | Facility: CLINIC | Age: 57
End: 2017-03-15

## 2017-03-15 ENCOUNTER — LAB (OUTPATIENT)
Dept: ONCOLOGY | Facility: CLINIC | Age: 57
End: 2017-03-15

## 2017-03-15 VITALS
HEIGHT: 66 IN | HEART RATE: 80 BPM | DIASTOLIC BLOOD PRESSURE: 76 MMHG | BODY MASS INDEX: 47.09 KG/M2 | OXYGEN SATURATION: 98 % | SYSTOLIC BLOOD PRESSURE: 148 MMHG | RESPIRATION RATE: 16 BRPM | TEMPERATURE: 98.3 F | WEIGHT: 293 LBS

## 2017-03-15 DIAGNOSIS — E11.22 TYPE 2 DIABETES MELLITUS WITH STAGE 3 CHRONIC KIDNEY DISEASE, WITH LONG-TERM CURRENT USE OF INSULIN (HCC): Primary | ICD-10-CM

## 2017-03-15 DIAGNOSIS — N18.6 TYPE 2 DIABETES MELLITUS WITH ESRD (END-STAGE RENAL DISEASE) (HCC): ICD-10-CM

## 2017-03-15 DIAGNOSIS — E11.22 TYPE 2 DIABETES MELLITUS WITH ESRD (END-STAGE RENAL DISEASE) (HCC): ICD-10-CM

## 2017-03-15 DIAGNOSIS — D63.1 ANEMIA IN CKD (CHRONIC KIDNEY DISEASE): Primary | ICD-10-CM

## 2017-03-15 DIAGNOSIS — N18.30 TYPE 2 DIABETES MELLITUS WITH STAGE 3 CHRONIC KIDNEY DISEASE, WITH LONG-TERM CURRENT USE OF INSULIN (HCC): Primary | ICD-10-CM

## 2017-03-15 DIAGNOSIS — N18.9 ANEMIA IN CKD (CHRONIC KIDNEY DISEASE): Primary | ICD-10-CM

## 2017-03-15 DIAGNOSIS — Z79.4 TYPE 2 DIABETES MELLITUS WITH STAGE 3 CHRONIC KIDNEY DISEASE, WITH LONG-TERM CURRENT USE OF INSULIN (HCC): Primary | ICD-10-CM

## 2017-03-15 LAB
ALBUMIN SERPL-MCNC: 4.2 G/DL (ref 3.5–5)
ALBUMIN/GLOB SERPL: 1.3 G/DL
ALP SERPL-CCNC: 69 U/L (ref 38–126)
ALT SERPL W P-5'-P-CCNC: 29 U/L (ref 9–52)
ANION GAP SERPL CALCULATED.3IONS-SCNC: 12 MMOL/L
AST SERPL-CCNC: 19 U/L (ref 5–40)
AUTO MIXED CELLS #: 0.4 10*3/UL (ref 0.1–1.5)
AUTO MIXED CELLS %: 5.3 % (ref 0.2–15.1)
BILIRUB SERPL-MCNC: 0.3 MG/DL (ref 0.2–1.3)
BUN BLD-MCNC: 31 MG/DL (ref 7–26)
BUN/CREAT SERPL: 12.9 (ref 7–25)
CALCIUM SPEC-SCNC: 9.8 MG/DL (ref 8.4–10.2)
CHLORIDE SERPL-SCNC: 102 MMOL/L (ref 98–107)
CO2 SERPL-SCNC: 28 MMOL/L (ref 22–30)
CREAT BLD-MCNC: 2.4 MG/DL (ref 0.7–1.4)
ERYTHROCYTE [DISTWIDTH] IN BLOOD BY AUTOMATED COUNT: 17.7 % (ref 11.5–14.5)
GFR SERPL CREATININE-BSD FRML MDRD: 21 ML/MIN/1.73
GLOBULIN UR ELPH-MCNC: 3.3 GM/DL
GLUCOSE BLD-MCNC: 141 MG/DL (ref 75–110)
HCT VFR BLD AUTO: 31.1 % (ref 37–47)
HGB BLD-MCNC: 10 G/DL (ref 12–16)
LYMPHOCYTES # BLD AUTO: 2 10*3/MM3 (ref 0.8–7)
LYMPHOCYTES NFR BLD AUTO: 23.5 % (ref 10–58.5)
MCH RBC QN AUTO: 30.5 PG (ref 27–31)
MCHC RBC AUTO-ENTMCNC: 32.2 G/DL (ref 33–37)
MCV RBC AUTO: 94.9 FL (ref 81–99)
NEUTROPHILS # BLD AUTO: 5.9 10*3/MM3 (ref 2–7.8)
NEUTROPHILS NFR BLD AUTO: 71.2 % (ref 37–92)
PLATELET # BLD AUTO: 189 10*3/MM3 (ref 130–400)
PMV BLD AUTO: 8.7 FL (ref 6–12)
POTASSIUM BLD-SCNC: 4.6 MMOL/L (ref 3.6–5)
PROT SERPL-MCNC: 7.5 G/DL (ref 6.3–8.2)
RBC # BLD AUTO: 3.28 10*6/MM3 (ref 4.2–5.4)
SODIUM BLD-SCNC: 142 MMOL/L (ref 137–145)
WBC NRBC COR # BLD: 8.3 10*3/MM3 (ref 4.8–10.8)

## 2017-03-15 PROCEDURE — 36415 COLL VENOUS BLD VENIPUNCTURE: CPT | Performed by: INTERNAL MEDICINE

## 2017-03-15 PROCEDURE — 99213 OFFICE O/P EST LOW 20 MIN: CPT | Performed by: INTERNAL MEDICINE

## 2017-03-15 PROCEDURE — 85025 COMPLETE CBC W/AUTO DIFF WBC: CPT | Performed by: INTERNAL MEDICINE

## 2017-03-15 PROCEDURE — 80053 COMPREHEN METABOLIC PANEL: CPT | Performed by: INTERNAL MEDICINE

## 2017-03-15 RX ORDER — FERROUS SULFATE 325(65) MG
TABLET ORAL
Status: ON HOLD | COMMUNITY
End: 2017-03-22 | Stop reason: SDUPTHER

## 2017-03-15 RX ORDER — LEVOTHYROXINE SODIUM 0.05 MG/1
TABLET ORAL
Status: ON HOLD | COMMUNITY
End: 2017-03-22 | Stop reason: SDUPTHER

## 2017-03-15 NOTE — PROGRESS NOTES
[facility]  HEMATOLOGY & ONCOLOGY        Subjective     VISIT DIAGNOSIS:   No diagnosis found.    REASON FOR VISIT:   No chief complaint on file.       HEMATOLOGY / ONCOLOGY HISTORY:Ms. Shrestha is a 55 year old female with past medical history of hypertension, chronic kidney disease, diabetes insulindependent  type 2   No history exists.     [No treatment plan]  Cancer Staging Information:  No matching staging information was found for the patient.      INTERVAL HISTORY  Patient ID: Maria C Shrestha is a 56 y.o. year old female   Anemia secondary to chronic kidney disease, Stage III. Patient has not required VALERIE therapy this far.  2. Iron deficiency in the setting of chronic kidney disease, has benefitted from Feraheme. Had Feraheme in January, hemoglobin has  now near normalized at 10.7        Review of Systems   Constitutional: Negative.    HENT: Negative.    Eyes: Negative.    Respiratory: Negative.    Cardiovascular: Negative.    Gastrointestinal: Negative.    Endocrine: Negative.    Genitourinary: Negative.    Musculoskeletal: Negative.    Skin: Negative.    Allergic/Immunologic: Negative.    Neurological: Negative.    Hematological: Negative.    Psychiatric/Behavioral: Negative.             Medications:    Current Outpatient Prescriptions   Medication Sig Dispense Refill   • allopurinol (ZYLOPRIM) 100 MG tablet Take 100 mg by mouth 2 (Two) Times a Day.     • amLODIPine (NORVASC) 5 MG tablet Take 5 mg by mouth Daily.     • aspirin 81 MG tablet Take 81 mg by mouth Daily.     • B-D ULTRA-FINE 33 LANCETS misc Use 4 x daily 120 each 11   • busPIRone (BUSPAR) 10 MG tablet Take 10 mg by mouth 2 (Two) Times a Day.     • butalbital-acetaminophen-caffeine (FIORICET, ESGIC) -40 MG per tablet Take 1 tablet by mouth As Needed for headaches.     • calcitriol (ROCALTROL) 0.25 MCG capsule Take 0.25 mcg by mouth 3 (Three) Times a Week.     • Calcium Carb-Cholecalciferol (CALCIUM-VITAMIN D3) 600-400 MG-UNIT  "tablet Take 1 tablet by mouth 2 (Two) Times a Day.     • carvedilol (COREG) 3.125 MG tablet Take 3.125 mg by mouth 2 (Two) Times a Day.     • cetirizine (zyrTEC) 10 MG tablet Take 10 mg by mouth As Needed.     • cholecalciferol (VITAMIN D3) 17802 UNITS capsule Take 50,000 Units by mouth Every 7 (Seven) Days.     • citalopram (CeleXA) 20 MG tablet Take 40 mg by mouth Daily.     • Dulaglutide (TRULICITY) 1.5 MG/0.5ML solution pen-injector Inject 1.5 mg under the skin Every 7 (Seven) Days. 12 pen 3   • ferrous sulfate 325 (65 FE) MG tablet Take 1 tablet by mouth Daily With Breakfast. 60 tablet 3   • ferrous sulfate 325 (65 FE) MG tablet Take 325 mg by mouth 2 times daily (before meals)      • furosemide (LASIX) 40 MG tablet Take 1 tablet by mouth Daily. 90 tablet 3   • Glucose Blood (BLOOD GLUCOSE TEST) strip Use 4 x daily, use any brand covered by insurance or same brand as before 120 each 11   • insulin glargine (LANTUS) 100 UNIT/ML injection Inject up to 100 u daily 9 each 3   • insulin regular (HUMULIN R) 500 UNIT/ML CONCENTRATED injection Use up to 0.30 ml four times daily 5 vial 3   • Insulin Syringe 31G X 5/16\" 1 ML misc 4 x daily 120 each 11   • Insulin Syringe-Needle U-100 31G X 5/16\" 0.3 ML misc use as indicated 4 times daily. 120 each 11   • lamoTRIgine (LaMICtal) 50 MG tablet dispersible disintegrating tablet Take 50 mg by mouth Daily.     • levothyroxine (SYNTHROID) 50 MCG tablet Take 1 tablet by mouth Daily. 90 tablet 3   • levothyroxine (SYNTHROID, LEVOTHROID) 50 MCG tablet Take 50 mcg by mouth Daily     • lisinopril (PRINIVIL,ZESTRIL) 20 MG tablet Take 20 mg by mouth 2 (Two) Times a Day.     • Multiple Vitamins-Minerals (MULTIVITAMIN ADULT PO) Take 1 tablet by mouth Daily.     • potassium gluconate 595 MG tablet tablet Take 595 mg by mouth 2 (Two) Times a Day.     • rosuvastatin (CRESTOR) 10 MG tablet Take 10 mg by mouth Every Other Day.     • sodium bicarbonate 650 MG tablet Take 650 mg by mouth 3 " (Three) Times a Day.       No current facility-administered medications for this visit.        ALLERGIES:    Allergies   Allergen Reactions   • Codeine Nausea Only       Objective      @VITALS    Current Status 3/15/2017   ECOG score 2       General Appearance: Patient is awake, alert, oriented and in no acute distress. Patient is welldeveloped, wellnourished, and appears stated age.  HEENT: Normocephalic. Sclerae clear, conjunctiva pink, extraocular movements intact, pupils, round, reactive to light and  accommodation. Mouth and throat are clear with moist oral mucosa.  NECK: Supple, no jugular venous distention, thyroid not enlarged.  LYMPH: No cervical, supraclavicular, axillary, or inguinal lymphadenopathy.  CHEST: Equal bilateral expansion, AP  diameter normal, resonant percussion note  LUNGS: Good air movement, no rales, rhonchi, rubs or wheezes with auscultation  CARDIO: Regular sinus rhythm, no murmurs, gallops or rubs.  ABDOMEN: Nondistended, soft, No tenderness, no guarding, no rebound, No hepatosplenomegaly. No abdominal masses. Bowel sounds positive. No hernia  GENITALIA: Not examined.  BREASTS: Not examined.  MUSKEL: No joint swelling, decreased motion, or inflammation  EXTREMS: No edema, clubbing, cyanosis, No varicose veins.  NEURO: Grossly nonfocal, Gait is coordinated and smooth, Cognition is preserved.  SKIN: No rashes, no ecchymoses, no petechia.  PSYCH: Oriented to time, place and person. Memory is preserved. Mood and affect appear normal      RECENT LABS:  Lab on 03/15/2017   Component Date Value Ref Range Status   • WBC 03/15/2017 8.30  4.80 - 10.80 10*3/mm3 Final   • RBC 03/15/2017 3.28* 4.20 - 5.40 10*6/mm3 Final   • Hemoglobin 03/15/2017 10.0* 12.0 - 16.0 g/dL Final   • Hematocrit 03/15/2017 31.1* 37.0 - 47.0 % Final   • MCV 03/15/2017 94.9  81.0 - 99.0 fL Final   • MCH 03/15/2017 30.5  27.0 - 31.0 pg Final   • MCHC 03/15/2017 32.2* 33.0 - 37.0 g/dL Final   • RDW 03/15/2017 17.7* 11.5 - 14.5  % Final   • MPV 03/15/2017 8.7  6.0 - 12.0 fL Final   • Platelets 03/15/2017 189  130 - 400 10*3/mm3 Final   • Neutrophil % 03/15/2017 71.2  37.0 - 92.0 % Final   • Lymphocyte % 03/15/2017 23.5  10.0 - 58.5 % Final   • Auto Mixed Cells % 03/15/2017 5.3  0.2 - 15.1 % Final   • Neutrophils, Absolute 03/15/2017 5.90  2.00 - 7.80 10*3/mm3 Final   • Lymphocytes, Absolute 03/15/2017 2.00  0.80 - 7.00 10*3/mm3 Final   • Auto Mixed Cells # 03/15/2017 0.40  0.10 - 1.50 10*3/uL Final       RADIOLOGY:  No results found.         Assessment/Plan        Anemia secondary to chronic kidney disease, Stage III. Patient has not required VALERIE therapy this far.  2. Iron deficiency in the setting of chronic kidney disease, has benefitted from Feraheme. Had Feraheme in January, hemoglobin has  now near normalized at 10.3.  Plan continue watchful waiting return to clinic in 3 months time and check another CBC and iron studies.  If the iron is still low I may give additional intravenous iron.  Her CMS guidelines I cannot give her Procrit unless hemoglobin is less than 10.    Clinically patient asymptomatic.  I will redraw her iron studies today.  Hemoglobin is a 10.  Return to clinic in 3 months time.            Marco A Melo MD    3/15/2017    3:15 PM

## 2017-03-20 ENCOUNTER — ANESTHESIA EVENT (OUTPATIENT)
Dept: GASTROENTEROLOGY | Facility: HOSPITAL | Age: 57
End: 2017-03-20

## 2017-03-22 ENCOUNTER — ANESTHESIA (OUTPATIENT)
Dept: GASTROENTEROLOGY | Facility: HOSPITAL | Age: 57
End: 2017-03-22

## 2017-03-22 ENCOUNTER — HOSPITAL ENCOUNTER (OUTPATIENT)
Facility: HOSPITAL | Age: 57
Setting detail: HOSPITAL OUTPATIENT SURGERY
Discharge: HOME | End: 2017-03-22
Attending: INTERNAL MEDICINE | Admitting: INTERNAL MEDICINE

## 2017-03-22 VITALS
DIASTOLIC BLOOD PRESSURE: 54 MMHG | TEMPERATURE: 97.4 F | HEIGHT: 65 IN | OXYGEN SATURATION: 100 % | BODY MASS INDEX: 48.82 KG/M2 | HEART RATE: 70 BPM | RESPIRATION RATE: 18 BRPM | SYSTOLIC BLOOD PRESSURE: 128 MMHG | WEIGHT: 293 LBS

## 2017-03-22 DIAGNOSIS — K62.5 BRBPR (BRIGHT RED BLOOD PER RECTUM): ICD-10-CM

## 2017-03-22 DIAGNOSIS — Z86.010 HX OF COLONIC POLYP: ICD-10-CM

## 2017-03-22 DIAGNOSIS — Z80.0 FAMILY HX OF COLON CANCER: ICD-10-CM

## 2017-03-22 LAB — GLUCOSE BLDC GLUCOMTR-MCNC: 98 MG/DL (ref 70–130)

## 2017-03-22 PROCEDURE — 82962 GLUCOSE BLOOD TEST: CPT

## 2017-03-22 PROCEDURE — 25010000002 PROPOFOL 10 MG/ML EMULSION: Performed by: NURSE ANESTHETIST, CERTIFIED REGISTERED

## 2017-03-22 PROCEDURE — 88305 TISSUE EXAM BY PATHOLOGIST: CPT | Performed by: INTERNAL MEDICINE

## 2017-03-22 PROCEDURE — 45385 COLONOSCOPY W/LESION REMOVAL: CPT | Performed by: INTERNAL MEDICINE

## 2017-03-22 PROCEDURE — 45380 COLONOSCOPY AND BIOPSY: CPT | Performed by: INTERNAL MEDICINE

## 2017-03-22 RX ORDER — SODIUM CHLORIDE 0.9 % (FLUSH) 0.9 %
1-10 SYRINGE (ML) INJECTION AS NEEDED
Status: DISCONTINUED | OUTPATIENT
Start: 2017-03-22 | End: 2017-03-22 | Stop reason: HOSPADM

## 2017-03-22 RX ORDER — ONDANSETRON 2 MG/ML
4 INJECTION INTRAMUSCULAR; INTRAVENOUS ONCE AS NEEDED
Status: DISCONTINUED | OUTPATIENT
Start: 2017-03-22 | End: 2017-03-22 | Stop reason: HOSPADM

## 2017-03-22 RX ORDER — PROPOFOL 10 MG/ML
VIAL (ML) INTRAVENOUS AS NEEDED
Status: DISCONTINUED | OUTPATIENT
Start: 2017-03-22 | End: 2017-03-22 | Stop reason: SURG

## 2017-03-22 RX ORDER — SODIUM CHLORIDE 9 MG/ML
100 INJECTION, SOLUTION INTRAVENOUS CONTINUOUS
Status: DISCONTINUED | OUTPATIENT
Start: 2017-03-22 | End: 2017-03-22 | Stop reason: HOSPADM

## 2017-03-22 RX ADMIN — PROPOFOL 50 MG: 10 INJECTION, EMULSION INTRAVENOUS at 08:34

## 2017-03-22 RX ADMIN — SODIUM CHLORIDE 100 ML/HR: 9 INJECTION, SOLUTION INTRAVENOUS at 07:08

## 2017-03-22 RX ADMIN — PROPOFOL 50 MG: 10 INJECTION, EMULSION INTRAVENOUS at 08:38

## 2017-03-22 RX ADMIN — PROPOFOL 50 MG: 10 INJECTION, EMULSION INTRAVENOUS at 08:40

## 2017-03-22 RX ADMIN — PROPOFOL 50 MG: 10 INJECTION, EMULSION INTRAVENOUS at 08:36

## 2017-03-22 RX ADMIN — PROPOFOL 50 MG: 10 INJECTION, EMULSION INTRAVENOUS at 08:22

## 2017-03-22 RX ADMIN — PROPOFOL 50 MG: 10 INJECTION, EMULSION INTRAVENOUS at 08:25

## 2017-03-22 RX ADMIN — PROPOFOL 50 MG: 10 INJECTION, EMULSION INTRAVENOUS at 08:28

## 2017-03-22 RX ADMIN — PROPOFOL 50 MG: 10 INJECTION, EMULSION INTRAVENOUS at 08:30

## 2017-03-22 RX ADMIN — PROPOFOL 50 MG: 10 INJECTION, EMULSION INTRAVENOUS at 08:23

## 2017-03-22 NOTE — H&P (VIEW-ONLY)
[facility] GASTROENTEROLOGY - OFFICE NOTE    2/22/2017    Maria C Shrestha   1960    Chief Complaint   Patient presents with   • Hematochezia     chronic    pcp dr nguyen       HISTORY OF PRESENT ILLNESS    Maria C Shrestha is a 56 y.o. female presents  with bright blood per rectum.  This is chronic.  This is been going on for several years.  This occurs occasionally.  The blood was a small amount.  She complains of history of hemorrhoids.  She is moving her bowels once to twice a day she has been found to be iron deficient anemia over the last 2 years.  States her hemoglobin usually runs anywhere from 9-10 has been on oral iron over the last 3-4 months she does see a hematologist but cannot think of his name at this time.  [place] hematologist is here [place].  I do see a future appointment with Dr. Melo  for March 2017.  She does have history of stage III kidney disease as well and is followed by Dr. Davis.  Her last colonoscopy was January 2014 for history of polyps and intermittent bright red blood per rectum.  She had 4 polyps, and apparent inflamed anal papilla.  It was suspected that her anal papilla and hemorrhoids were the source of her intermittent bright red blood per rectum.  Recommendation 3 years for repeat anoscopy.      Past Medical History   Diagnosis Date   • Acquired hypothyroidism 2/6/2017   • Allergic    • Anemia    • Anxiety    • Arthritis    • Chronic kidney disease      3rd stage   • Depression    • Disease of thyroid gland    • Dyslipidemia    • Elevated cholesterol    • Essential hypertension    • Gallbladder abscess    • Obesity    • Type 2 diabetes mellitus    • Type II diabetes mellitus, uncontrolled        Past Surgical History   Procedure Laterality Date   • Cholecystectomy     • Tonsillectomy     • Adenoidectomy     • Colonoscopy  01/02/2014   • Dilatation and curettage       X 2   • Endoscopy         Outpatient Prescriptions Marked as Taking for the 2/22/17 encounter (Office Visit)  "with FLOR Back   Medication Sig Dispense Refill   • allopurinol (ZYLOPRIM) 100 MG tablet Take 100 mg by mouth 2 (Two) Times a Day.     • amLODIPine (NORVASC) 5 MG tablet Take 5 mg by mouth Daily.     • aspirin 81 MG tablet Take 81 mg by mouth Daily.     • B-D ULTRA-FINE 33 LANCETS misc Use 4 x daily 120 each 11   • busPIRone (BUSPAR) 10 MG tablet Take 10 mg by mouth 2 (Two) Times a Day.     • butalbital-acetaminophen-caffeine (FIORICET, ESGIC) -40 MG per tablet Take 1 tablet by mouth As Needed for headaches.     • calcitriol (ROCALTROL) 0.25 MCG capsule Take 0.25 mcg by mouth 3 (Three) Times a Week.     • Calcium Carb-Cholecalciferol (CALCIUM-VITAMIN D3) 600-400 MG-UNIT tablet Take 1 tablet by mouth 2 (Two) Times a Day.     • carvedilol (COREG) 3.125 MG tablet Take 3.125 mg by mouth 2 (Two) Times a Day.     • cetirizine (zyrTEC) 10 MG tablet Take 10 mg by mouth As Needed.     • cholecalciferol (VITAMIN D3) 73419 UNITS capsule Take 50,000 Units by mouth Every 7 (Seven) Days.     • citalopram (CeleXA) 20 MG tablet Take 40 mg by mouth Daily.     • Dulaglutide (TRULICITY) 1.5 MG/0.5ML solution pen-injector Inject 1.5 mg under the skin Every 7 (Seven) Days. 12 pen 3   • ferrous sulfate 325 (65 FE) MG tablet Take 1 tablet by mouth Daily With Breakfast. 60 tablet 3   • furosemide (LASIX) 40 MG tablet Take 1 tablet by mouth Daily. 90 tablet 3   • Glucose Blood (BLOOD GLUCOSE TEST) strip Use 4 x daily, use any brand covered by insurance or same brand as before 120 each 11   • insulin glargine (LANTUS) 100 UNIT/ML injection Inject up to 100 u daily 9 each 3   • insulin regular (HUMULIN R) 500 UNIT/ML CONCENTRATED injection Use up to 0.30 ml four times daily 5 vial 3   • Insulin Syringe 31G X 5/16\" 1 ML misc 4 x daily 120 each 11   • Insulin Syringe-Needle U-100 31G X 5/16\" 0.3 ML misc use as indicated 4 times daily. 120 each 11   • lamoTRIgine (LaMICtal) 50 MG tablet dispersible disintegrating tablet Take 50 " mg by mouth Daily.     • levothyroxine (SYNTHROID) 50 MCG tablet Take 1 tablet by mouth Daily. 90 tablet 3   • lisinopril (PRINIVIL,ZESTRIL) 20 MG tablet Take 20 mg by mouth 2 (Two) Times a Day.     • Multiple Vitamins-Minerals (MULTIVITAMIN ADULT PO) Take 1 tablet by mouth Daily.     • potassium gluconate 595 MG tablet tablet Take 595 mg by mouth 2 (Two) Times a Day.     • rosuvastatin (CRESTOR) 10 MG tablet Take 10 mg by mouth Every Other Day.     • sodium bicarbonate 650 MG tablet Take 650 mg by mouth 3 (Three) Times a Day.         Allergies   Allergen Reactions   • Codeine Nausea Only       Social History     Social History   • Marital status:      Spouse name: N/A   • Number of children: N/A   • Years of education: N/A     Occupational History   • Not on file.     Social History Main Topics   • Smoking status: Never Smoker   • Smokeless tobacco: Never Used   • Alcohol use No   • Drug use: No   • Sexual activity: Not on file     Other Topics Concern   • Not on file     Social History Narrative       Family History   Problem Relation Age of Onset   • Diabetes Other    • Cancer Mother    • Cancer Father    • Heart disease Father    • Diabetes Father    • Obesity Father    • Stroke Father    • Cancer Maternal Grandmother    • Diabetes Maternal Grandfather    • Cancer Paternal Grandmother    • Colon cancer Paternal Grandmother    • Diabetes Paternal Grandfather    • Heart disease Paternal Grandfather        Review of Systems   Constitutional: Negative for appetite change, chills, fatigue, fever and unexpected weight change.   HENT: Negative for sore throat and trouble swallowing.    Eyes: Negative for pain and visual disturbance.   Respiratory: Positive for shortness of breath (occasional). Negative for cough, chest tightness and wheezing.    Cardiovascular: Negative for chest pain and palpitations.   Gastrointestinal: Negative for abdominal distention, abdominal pain, anal bleeding, blood in stool,  "constipation, diarrhea, nausea, rectal pain and vomiting.        As mentioned in hpi   Endocrine: Negative for cold intolerance and heat intolerance.   Genitourinary: Negative for difficulty urinating, dysuria and hematuria.   Musculoskeletal: Negative for arthralgias and back pain.   Skin: Negative for color change and rash.   Neurological: Positive for headaches. Negative for dizziness, tremors, seizures, syncope and light-headedness.   Hematological: Negative for adenopathy. Does not bruise/bleed easily.   Psychiatric/Behavioral: Negative for confusion. The patient is not nervous/anxious.        Vitals:    02/22/17 1035   BP: 124/60   BP Location: Left arm   Patient Position: Sitting   Cuff Size: Large Adult   Pulse: 88   Temp: 99.1 °F (37.3 °C)   SpO2: 99%   Weight: (!) 403 lb (183 kg)   Height: 65.5\" (166.4 cm)      Body mass index is 66.04 kg/(m^2).    Physical Exam   Constitutional: She is oriented to person, place, and time. She appears well-developed and well-nourished. No distress.   HENT:   Head: Normocephalic and atraumatic.   Mouth/Throat: Oropharynx is clear and moist.   Eyes: Pupils are equal, round, and reactive to light. No scleral icterus.   Neck: Normal range of motion. Neck supple. No JVD present. No tracheal deviation present.   Cardiovascular: Normal rate, regular rhythm, normal heart sounds and intact distal pulses.  Exam reveals no gallop and no friction rub.    No murmur heard.  Pulmonary/Chest: Effort normal and breath sounds normal. No stridor. No respiratory distress. She has no wheezes. She has no rales.   Abdominal: Soft. Bowel sounds are normal. She exhibits no distension and no mass. There is no tenderness. There is no rebound and no guarding.   Musculoskeletal: Normal range of motion. She exhibits no edema or deformity.   Neurological: She is alert and oriented to person, place, and time. No cranial nerve deficit.   Skin: Skin is warm and dry. No rash noted.   Psychiatric: She has a " normal mood and affect. Her behavior is normal.   Vitals reviewed.      Results for orders placed or performed in visit on 02/06/17   POC Glucose Fingerstick   Result Value Ref Range    Glucose 124 70 - 130 mg/dL           ASSESSMENT AND PLAN    Maria C was seen today for hematochezia.    Diagnoses and all orders for this visit:    BRBPR (bright red blood per rectum)  -     Case request    Hx of colonic polyp  -     Case request    Family hx of colon cancer  -     Case request    Essential hypertension    Controlled type 2 diabetes mellitus with complication, with long-term current use of insulin  Comments:  has kidney disease due to diabetes.     Other orders  -     polyethylene glycol (GOLYTELY) 236 G solution; Take 4,000 mL by mouth 1 (One) Time for 1 dose. Take as directed per instruction sheet.     in regards to bright red blood per rectum, differential diagnosis discussed.  This is chronic.  Recommend ER if worsening symptoms.  Plan for colonoscopy.  Recommend to take blood pressure medication a.m. of procedure if that is when she normally takes.  Also instructed on how to adjust diabetes medications.      COLONOSCOPY WITH ANESTHESIA (N/A) All risks, benefits, alternatives, and indications of colonoscopy procedure have been discussed with the patient. Risks to include perforation of the colon requiring possible surgery or colostomy, risk of bleeding from biopsies or removal of colon tissue, possibility of missing a colon polyp or cancer, or adverse drug reaction.  Benefits to include the diagnosis and management of disease of the colon and rectum. Alternatives to include barium enema, radiographic evaluation, lab testing or no intervention. Pt verbalizes understanding and agrees.         Body mass index is 66.04 kg/(m^2).       FLOR Schneider    EMR Dragon/transcription disclaimer:  Much of this encounter note is electronic transcription/translation of spoken language to printed text.  The electronic  translation of spoken language may be erroneous, or at times, nonsensical words or phrases may be inadvertently transcribed.  Although I have reviewed the note for such errors, some may still exist.    Results for orders placed or performed in visit on 02/06/17   POC Glucose Fingerstick   Result Value Ref Range    Glucose 124 70 - 130 mg/dL

## 2017-03-22 NOTE — ANESTHESIA PREPROCEDURE EVALUATION
Anesthesia Evaluation     Patient summary reviewed   no history of anesthetic complications:     Airway   Mallampati: III  TM distance: >3 FB  Neck ROM: full  Dental - normal exam     Pulmonary    (+) sleep apnea on CPAP,   (-) asthma, not a smoker  Cardiovascular   Exercise tolerance: (Limited mobility, denies chest pain, shortness of breath)    Patient on routine beta blocker and Beta blocker given within 24 hours of surgery    (+) hypertension,       Neuro/Psych  (-) seizures, TIA, CVA  GI/Hepatic/Renal/Endo    (+) obesity, morbid obesity, chronic renal disease (Stage 3 CKD), diabetes mellitus using insulin, hypothyroidism,   (-) liver disease    Musculoskeletal     Abdominal    Substance History      OB/GYN          Other   (+) arthritis                                 Anesthesia Plan    ASA 3     general     intravenous induction   Anesthetic plan and risks discussed with patient.

## 2017-03-22 NOTE — PLAN OF CARE
Problem: Patient Care Overview (Adult)  Goal: Adult Individualization and Mutuality    03/22/17 0635   Individualization   Patient Specific Preferences none   Patient Specific Goals none   Patient Specific Interventions none   Mutuality/Individual Preferences   What Anxieties, Fears or Concerns Do You Have About Your Health or Care? none   What Questions Do You Have About Your Health or Care? none   What Information Would Help Us Give You More Personalized Care? none

## 2017-03-22 NOTE — PLAN OF CARE
Problem: Patient Care Overview (Adult)  Goal: Plan of Care Review  Outcome: Outcome(s) achieved Date Met:  03/22/17 03/22/17 0852   Coping/Psychosocial Response Interventions   Plan Of Care Reviewed With patient;spouse   Patient Care Overview   Progress improving   Outcome Evaluation   Outcome Summary/Follow up Plan DISCHARGE CRITERIA MET

## 2017-03-22 NOTE — PLAN OF CARE
Problem: GI Endoscopy (Adult)  Goal: Signs and Symptoms of Listed Potential Problems Will be Absent or Manageable (GI Endoscopy)  Outcome: Outcome(s) achieved Date Met:  03/22/17 03/22/17 0852   GI Endoscopy   Problems Assessed (GI Endoscopy) all   Problems Present (GI Endoscopy) none

## 2017-03-22 NOTE — ANESTHESIA POSTPROCEDURE EVALUATION
Patient: Maria C Shrestha    Procedure Summary     Date Anesthesia Start Anesthesia Stop Room / Location    03/22/17 0815 0848  [facility] /  [facility]       Procedure Diagnosis Surgeon Provider    COLONOSCOPY WITH ANESTHESIA (N/A ) BRBPR (bright red blood per rectum); Family hx of colon cancer; Hx of colonic polyp  (BRBPR (bright red blood per rectum) [K62.5]; Family hx of colon cancer [Z80.0]; Hx of colonic polyp [Z86.010]) MD Hussain Hernandez CRNA          Anesthesia Type: general  Last vitals  BP      Temp      Pulse     Resp      SpO2        Post Anesthesia Care and Evaluation    Patient location during evaluation: PHASE II  Patient participation: complete - patient participated  Level of consciousness: awake and sleepy but conscious  Pain score: 0  Pain management: adequate  Airway patency: patent  Anesthetic complications: No anesthetic complications    Cardiovascular status: acceptable  Respiratory status: acceptable  Hydration status: acceptable

## 2017-03-22 NOTE — PLAN OF CARE
Problem: Patient Care Overview (Adult)  Goal: Plan of Care Review  Outcome: Ongoing (interventions implemented as appropriate)    03/22/17 0828   Coping/Psychosocial Response Interventions   Plan Of Care Reviewed With patient   Patient Care Overview   Progress no change   Outcome Evaluation   Outcome Summary/Follow up Plan tolerating well         Problem: GI Endoscopy (Adult)  Goal: Signs and Symptoms of Listed Potential Problems Will be Absent or Manageable (GI Endoscopy)  Outcome: Ongoing (interventions implemented as appropriate)

## 2017-03-23 LAB
CYTO UR: NORMAL
LAB AP CASE REPORT: NORMAL
LAB AP CLINICAL INFORMATION: NORMAL
Lab: NORMAL
PATH REPORT.FINAL DX SPEC: NORMAL
PATH REPORT.GROSS SPEC: NORMAL

## 2017-06-08 ENCOUNTER — LAB (OUTPATIENT)
Dept: LAB | Facility: HOSPITAL | Age: 57
End: 2017-06-08

## 2017-06-08 DIAGNOSIS — E11.69 MIXED DIABETIC HYPERLIPIDEMIA ASSOCIATED WITH TYPE 2 DIABETES MELLITUS (HCC): ICD-10-CM

## 2017-06-08 DIAGNOSIS — E55.9 VITAMIN D DEFICIENCY: ICD-10-CM

## 2017-06-08 DIAGNOSIS — E53.8 B12 DEFICIENCY: ICD-10-CM

## 2017-06-08 DIAGNOSIS — E78.2 MIXED DIABETIC HYPERLIPIDEMIA ASSOCIATED WITH TYPE 2 DIABETES MELLITUS (HCC): ICD-10-CM

## 2017-06-08 DIAGNOSIS — I15.2 HYPERTENSION ASSOCIATED WITH DIABETES (HCC): ICD-10-CM

## 2017-06-08 DIAGNOSIS — E11.22 TYPE 2 DIABETES MELLITUS WITH STAGE 3 CHRONIC KIDNEY DISEASE, WITH LONG-TERM CURRENT USE OF INSULIN (HCC): ICD-10-CM

## 2017-06-08 DIAGNOSIS — Z79.4 TYPE 2 DIABETES MELLITUS WITH STAGE 3 CHRONIC KIDNEY DISEASE, WITH LONG-TERM CURRENT USE OF INSULIN (HCC): ICD-10-CM

## 2017-06-08 DIAGNOSIS — E11.59 HYPERTENSION ASSOCIATED WITH DIABETES (HCC): ICD-10-CM

## 2017-06-08 DIAGNOSIS — N18.30 TYPE 2 DIABETES MELLITUS WITH STAGE 3 CHRONIC KIDNEY DISEASE, WITH LONG-TERM CURRENT USE OF INSULIN (HCC): ICD-10-CM

## 2017-06-08 LAB
25(OH)D3 SERPL-MCNC: 36.8 NG/ML (ref 30–100)
ALBUMIN SERPL-MCNC: 3.6 G/DL (ref 3.5–5)
ALBUMIN/GLOB SERPL: 1 G/DL (ref 1.1–2.5)
ALP SERPL-CCNC: 81 U/L (ref 24–120)
ALT SERPL W P-5'-P-CCNC: 16 U/L (ref 0–54)
ANION GAP SERPL CALCULATED.3IONS-SCNC: 10 MMOL/L (ref 4–13)
AST SERPL-CCNC: 19 U/L (ref 7–45)
AUTO MIXED CELLS #: 0.4 10*3/UL (ref 0.1–2.6)
AUTO MIXED CELLS %: 3.3 % (ref 0.1–24)
BILIRUB SERPL-MCNC: 0.4 MG/DL (ref 0.1–1)
BUN BLD-MCNC: 32 MG/DL (ref 5–21)
BUN/CREAT SERPL: 18.7
CALCIUM SPEC-SCNC: 9.3 MG/DL (ref 8.4–10.4)
CHLORIDE SERPL-SCNC: 109 MMOL/L (ref 98–110)
CHOLEST SERPL-MCNC: 171 MG/DL (ref 130–200)
CO2 SERPL-SCNC: 23 MMOL/L (ref 24–31)
CREAT BLD-MCNC: 1.71 MG/DL (ref 0.5–1.4)
ERYTHROCYTE [DISTWIDTH] IN BLOOD BY AUTOMATED COUNT: 16.1 % (ref 12–15)
GFR SERPL CREATININE-BSD FRML MDRD: 31 ML/MIN/1.73
GLOBULIN UR ELPH-MCNC: 3.7 GM/DL
GLUCOSE BLD-MCNC: 120 MG/DL (ref 70–100)
HBA1C MFR BLD: 6.2 %
HCT VFR BLD AUTO: 31.1 % (ref 37–47)
HDLC SERPL-MCNC: 33 MG/DL
HGB BLD-MCNC: 10.5 G/DL (ref 12–16)
LDLC SERPL CALC-MCNC: 65 MG/DL (ref 0–99)
LDLC/HDLC SERPL: 1.96 {RATIO}
LYMPHOCYTES # BLD AUTO: 2.2 10*3/MM3 (ref 0.8–7)
LYMPHOCYTES NFR BLD AUTO: 18.7 % (ref 15–45)
MCH RBC QN AUTO: 30.3 PG (ref 28–32)
MCHC RBC AUTO-ENTMCNC: 33.8 G/DL (ref 33–36)
MCV RBC AUTO: 89.6 FL (ref 82–98)
NEUTROPHILS # BLD AUTO: 9.3 10*3/MM3 (ref 1.5–8.3)
NEUTROPHILS NFR BLD AUTO: 78 % (ref 39–78)
PLATELET # BLD AUTO: 227 10*3/MM3 (ref 130–400)
PMV BLD AUTO: 8.9 FL (ref 6–12)
POTASSIUM BLD-SCNC: 4.2 MMOL/L (ref 3.5–5.3)
PROT SERPL-MCNC: 7.3 G/DL (ref 6.3–8.7)
RBC # BLD AUTO: 3.47 10*6/MM3 (ref 4.2–5.4)
SODIUM BLD-SCNC: 142 MMOL/L (ref 135–145)
TRIGL SERPL-MCNC: 367 MG/DL (ref 0–149)
TSH SERPL DL<=0.05 MIU/L-ACNC: 3.09 MIU/ML (ref 0.47–4.68)
VIT B12 BLD-MCNC: 727 PG/ML (ref 239–931)
VLDLC SERPL-MCNC: 73.4 MG/DL
WBC NRBC COR # BLD: 11.9 10*3/MM3 (ref 4.8–10.8)

## 2017-06-08 PROCEDURE — 82607 VITAMIN B-12: CPT | Performed by: INTERNAL MEDICINE

## 2017-06-08 PROCEDURE — 36415 COLL VENOUS BLD VENIPUNCTURE: CPT | Performed by: INTERNAL MEDICINE

## 2017-06-08 PROCEDURE — 84443 ASSAY THYROID STIM HORMONE: CPT | Performed by: INTERNAL MEDICINE

## 2017-06-08 PROCEDURE — 83036 HEMOGLOBIN GLYCOSYLATED A1C: CPT | Performed by: INTERNAL MEDICINE

## 2017-06-08 PROCEDURE — 82570 ASSAY OF URINE CREATININE: CPT | Performed by: INTERNAL MEDICINE

## 2017-06-08 PROCEDURE — 85025 COMPLETE CBC W/AUTO DIFF WBC: CPT | Performed by: INTERNAL MEDICINE

## 2017-06-08 PROCEDURE — 80061 LIPID PANEL: CPT | Performed by: INTERNAL MEDICINE

## 2017-06-08 PROCEDURE — 82306 VITAMIN D 25 HYDROXY: CPT | Performed by: INTERNAL MEDICINE

## 2017-06-08 PROCEDURE — 80053 COMPREHEN METABOLIC PANEL: CPT | Performed by: INTERNAL MEDICINE

## 2017-06-08 PROCEDURE — 82043 UR ALBUMIN QUANTITATIVE: CPT | Performed by: INTERNAL MEDICINE

## 2017-06-09 LAB
CREAT 24H UR-MCNC: 70.6 MG/DL
MICROALB/CRT. RATIO UR: 1113.6 MG/G CREAT (ref 0–30)
MICROALBUMIN UR-MCNC: 786.2 UG/ML

## 2017-06-13 DIAGNOSIS — D50.8 OTHER IRON DEFICIENCY ANEMIA: Primary | ICD-10-CM

## 2017-06-14 ENCOUNTER — OFFICE VISIT (OUTPATIENT)
Dept: ENDOCRINOLOGY | Facility: CLINIC | Age: 57
End: 2017-06-14

## 2017-06-14 ENCOUNTER — OFFICE VISIT (OUTPATIENT)
Dept: ONCOLOGY | Facility: CLINIC | Age: 57
End: 2017-06-14

## 2017-06-14 ENCOUNTER — LAB (OUTPATIENT)
Dept: ONCOLOGY | Facility: CLINIC | Age: 57
End: 2017-06-14

## 2017-06-14 VITALS
HEART RATE: 75 BPM | BODY MASS INDEX: 48.82 KG/M2 | SYSTOLIC BLOOD PRESSURE: 130 MMHG | DIASTOLIC BLOOD PRESSURE: 76 MMHG | TEMPERATURE: 98.6 F | RESPIRATION RATE: 18 BRPM | WEIGHT: 293 LBS | OXYGEN SATURATION: 96 % | HEIGHT: 65 IN

## 2017-06-14 VITALS
SYSTOLIC BLOOD PRESSURE: 140 MMHG | HEIGHT: 65 IN | HEART RATE: 100 BPM | WEIGHT: 293 LBS | DIASTOLIC BLOOD PRESSURE: 80 MMHG | BODY MASS INDEX: 48.82 KG/M2

## 2017-06-14 DIAGNOSIS — E11.69 MIXED DIABETIC HYPERLIPIDEMIA ASSOCIATED WITH TYPE 2 DIABETES MELLITUS (HCC): ICD-10-CM

## 2017-06-14 DIAGNOSIS — R53.83 FATIGUE, UNSPECIFIED TYPE: ICD-10-CM

## 2017-06-14 DIAGNOSIS — D50.8 OTHER IRON DEFICIENCY ANEMIA: ICD-10-CM

## 2017-06-14 DIAGNOSIS — E78.2 MIXED DIABETIC HYPERLIPIDEMIA ASSOCIATED WITH TYPE 2 DIABETES MELLITUS (HCC): ICD-10-CM

## 2017-06-14 DIAGNOSIS — E11.59 HYPERTENSION ASSOCIATED WITH DIABETES (HCC): ICD-10-CM

## 2017-06-14 DIAGNOSIS — E11.22 TYPE 2 DIABETES MELLITUS WITH STAGE 3 CHRONIC KIDNEY DISEASE, WITH LONG-TERM CURRENT USE OF INSULIN (HCC): Primary | ICD-10-CM

## 2017-06-14 DIAGNOSIS — E53.8 B12 DEFICIENCY: ICD-10-CM

## 2017-06-14 DIAGNOSIS — D50.9 IRON DEFICIENCY ANEMIA, UNSPECIFIED IRON DEFICIENCY ANEMIA TYPE: Primary | ICD-10-CM

## 2017-06-14 DIAGNOSIS — N18.9 ANEMIA IN CKD (CHRONIC KIDNEY DISEASE): Primary | ICD-10-CM

## 2017-06-14 DIAGNOSIS — E55.9 VITAMIN D DEFICIENCY: ICD-10-CM

## 2017-06-14 DIAGNOSIS — R01.1 HEART MURMUR: ICD-10-CM

## 2017-06-14 DIAGNOSIS — D63.1 ANEMIA IN CKD (CHRONIC KIDNEY DISEASE): Primary | ICD-10-CM

## 2017-06-14 DIAGNOSIS — N18.30 TYPE 2 DIABETES MELLITUS WITH STAGE 3 CHRONIC KIDNEY DISEASE, WITH LONG-TERM CURRENT USE OF INSULIN (HCC): Primary | ICD-10-CM

## 2017-06-14 DIAGNOSIS — Z79.4 TYPE 2 DIABETES MELLITUS WITH STAGE 3 CHRONIC KIDNEY DISEASE, WITH LONG-TERM CURRENT USE OF INSULIN (HCC): Primary | ICD-10-CM

## 2017-06-14 DIAGNOSIS — I15.2 HYPERTENSION ASSOCIATED WITH DIABETES (HCC): ICD-10-CM

## 2017-06-14 LAB
ALBUMIN SERPL-MCNC: 3.8 G/DL (ref 3.5–5)
ALBUMIN/GLOB SERPL: 1.1 G/DL
ALP SERPL-CCNC: 61 U/L (ref 38–126)
ALT SERPL W P-5'-P-CCNC: 21 U/L (ref 9–52)
ANION GAP SERPL CALCULATED.3IONS-SCNC: 11 MMOL/L
AST SERPL-CCNC: 22 U/L (ref 5–40)
AUTO MIXED CELLS #: 0.5 10*3/UL (ref 0.1–1.5)
AUTO MIXED CELLS %: 5.1 % (ref 0.2–15.1)
BILIRUB SERPL-MCNC: 0.4 MG/DL (ref 0.2–1.3)
BUN BLD-MCNC: 33 MG/DL (ref 7–26)
BUN/CREAT SERPL: 18.3 (ref 7–25)
CALCIUM SPEC-SCNC: 9.2 MG/DL (ref 8.4–10.2)
CHLORIDE SERPL-SCNC: 110 MMOL/L (ref 98–107)
CO2 SERPL-SCNC: 22 MMOL/L (ref 22–30)
CREAT BLD-MCNC: 1.8 MG/DL (ref 0.7–1.4)
ERYTHROCYTE [DISTWIDTH] IN BLOOD BY AUTOMATED COUNT: 19 % (ref 11.5–14.5)
GFR SERPL CREATININE-BSD FRML MDRD: 29 ML/MIN/1.73
GLOBULIN UR ELPH-MCNC: 3.4 GM/DL
GLUCOSE BLD-MCNC: 177 MG/DL (ref 75–110)
GLUCOSE BLDC GLUCOMTR-MCNC: 203 MG/DL (ref 70–130)
HCT VFR BLD AUTO: 31.6 % (ref 37–47)
HGB BLD-MCNC: 9.9 G/DL (ref 12–16)
LYMPHOCYTES # BLD AUTO: 1.9 10*3/MM3 (ref 0.8–7)
LYMPHOCYTES NFR BLD AUTO: 19.5 % (ref 10–58.5)
MCH RBC QN AUTO: 29.8 PG (ref 27–31)
MCHC RBC AUTO-ENTMCNC: 31.3 G/DL (ref 33–37)
MCV RBC AUTO: 95.2 FL (ref 81–99)
NEUTROPHILS # BLD AUTO: 7.2 10*3/MM3 (ref 2–7.8)
NEUTROPHILS NFR BLD AUTO: 75.4 % (ref 37–92)
PLATELET # BLD AUTO: 190 10*3/MM3 (ref 130–400)
PMV BLD AUTO: 8.2 FL (ref 6–12)
POTASSIUM BLD-SCNC: 4.2 MMOL/L (ref 3.6–5)
PROT SERPL-MCNC: 7.2 G/DL (ref 6.3–8.2)
RBC # BLD AUTO: 3.32 10*6/MM3 (ref 4.2–5.4)
SODIUM BLD-SCNC: 143 MMOL/L (ref 137–145)
WBC NRBC COR # BLD: 9.5 10*3/MM3 (ref 4.8–10.8)

## 2017-06-14 PROCEDURE — 80053 COMPREHEN METABOLIC PANEL: CPT | Performed by: INTERNAL MEDICINE

## 2017-06-14 PROCEDURE — 85025 COMPLETE CBC W/AUTO DIFF WBC: CPT | Performed by: INTERNAL MEDICINE

## 2017-06-14 PROCEDURE — 36415 COLL VENOUS BLD VENIPUNCTURE: CPT | Performed by: INTERNAL MEDICINE

## 2017-06-14 PROCEDURE — 99214 OFFICE O/P EST MOD 30 MIN: CPT | Performed by: INTERNAL MEDICINE

## 2017-06-14 PROCEDURE — 82962 GLUCOSE BLOOD TEST: CPT | Performed by: INTERNAL MEDICINE

## 2017-06-14 NOTE — PROGRESS NOTES
Maria C Shrestha is a 57 y.o. female who presents for  evaluation of   Chief Complaint   Patient presents with   • Diabetes         Primary Care / Referring Provider  Ole Soliman MD    History of Present Illness  Duration/Timing:  Diabetes mellitus type 2  timing constant    quality controlled    severity high     Severity (Complications/Hospitalizations)  Secondary Microvascular Complications:  Diabetic Nephropathy, No Diabetic Neuropathy      Context  Diabetes Regimen:  Insulin, Compliant with regimen  Blood Glucose Readings  near goal   Diet  counts carbs, eating only 45 g cho per meal   Exercise:  Does not exercise    Associated Signs/Symptoms  Hyperglycemic Symptoms:  No polyuria, No polydipsia, No polyphagia, Weight loss  Hypoglycemic Episodes:  No documented hypoglycemia    Past Medical History:   Diagnosis Date   • Acquired hypothyroidism 2/6/2017   • Allergic    • Anemia    • Anxiety    • Arthritis    • Chronic kidney disease     3rd stage   • Depression    • Disease of thyroid gland    • Dyslipidemia    • Elevated cholesterol    • Essential hypertension    • Gallbladder abscess    • Obesity    • Type 2 diabetes mellitus    • Type II diabetes mellitus, uncontrolled      Family History   Problem Relation Age of Onset   • Diabetes Other    • Cancer Mother    • Cancer Father    • Heart disease Father    • Diabetes Father    • Obesity Father    • Stroke Father    • Cancer Maternal Grandmother    • Diabetes Maternal Grandfather    • Cancer Paternal Grandmother    • Colon cancer Paternal Grandmother    • Diabetes Paternal Grandfather    • Heart disease Paternal Grandfather      Social History   Substance Use Topics   • Smoking status: Never Smoker   • Smokeless tobacco: Never Used   • Alcohol use No         Current Outpatient Prescriptions:   •  cetirizine (zyrTEC) 10 MG tablet, Take 10 mg by mouth As Needed., Disp: , Rfl:   •  cholecalciferol (VITAMIN D3) 60325 UNITS capsule, Take 50,000 Units by mouth Every 7  "(Seven) Days., Disp: , Rfl:   •  citalopram (CeleXA) 20 MG tablet, Take 40 mg by mouth Daily., Disp: , Rfl:   •  Dulaglutide (TRULICITY) 1.5 MG/0.5ML solution pen-injector, Inject 1.5 mg under the skin Every 7 (Seven) Days., Disp: 12 pen, Rfl: 3  •  ferrous sulfate 325 (65 FE) MG tablet, Take 1 tablet by mouth Daily With Breakfast. (Patient taking differently: Take 325 mg by mouth 2 (Two) Times a Day.), Disp: 60 tablet, Rfl: 3  •  furosemide (LASIX) 40 MG tablet, Take 1 tablet by mouth Daily., Disp: 90 tablet, Rfl: 3  •  Glucose Blood (BLOOD GLUCOSE TEST) strip, Use 4 x daily, use any brand covered by insurance or same brand as before, Disp: 120 each, Rfl: 11  •  insulin glargine (LANTUS) 100 UNIT/ML injection, Inject up to 100 u daily (Patient taking differently: Inject 192 Units under the skin Every Night. Inject up to 100 u daily), Disp: 9 each, Rfl: 3  •  Insulin Syringe 31G X 5/16\" 1 ML misc, 4 x daily, Disp: 120 each, Rfl: 11  •  Insulin Syringe-Needle U-100 31G X 5/16\" 0.3 ML misc, use as indicated 4 times daily., Disp: 120 each, Rfl: 11  •  lamoTRIgine (LaMICtal) 50 MG tablet dispersible disintegrating tablet, Take 50 mg by mouth Daily., Disp: , Rfl:   •  levothyroxine (SYNTHROID) 50 MCG tablet, Take 1 tablet by mouth Daily., Disp: 90 tablet, Rfl: 3  •  lisinopril (PRINIVIL,ZESTRIL) 20 MG tablet, Take 20 mg by mouth 2 (Two) Times a Day., Disp: , Rfl:   •  Multiple Vitamins-Minerals (MULTIVITAMIN ADULT PO), Take 1 tablet by mouth Daily., Disp: , Rfl:   •  potassium gluconate 595 MG tablet tablet, Take 595 mg by mouth 2 (Two) Times a Day., Disp: , Rfl:   •  rosuvastatin (CRESTOR) 10 MG tablet, Take 10 mg by mouth Every Other Day., Disp: , Rfl:   •  sodium bicarbonate 650 MG tablet, Take 650 mg by mouth 3 (Three) Times a Day., Disp: , Rfl:   •  allopurinol (ZYLOPRIM) 100 MG tablet, Take 100 mg by mouth 2 (Two) Times a Day., Disp: , Rfl:   •  aspirin 81 MG tablet, Take 81 mg by mouth Daily., Disp: , Rfl:   •  " B-D ULTRA-FINE 33 LANCETS misc, Use 4 x daily, Disp: 120 each, Rfl: 11  •  busPIRone (BUSPAR) 10 MG tablet, Take 10 mg by mouth 2 (Two) Times a Day., Disp: , Rfl:   •  calcitriol (ROCALTROL) 0.25 MCG capsule, Take 0.25 mcg by mouth 3 (Three) Times a Week., Disp: , Rfl:   •  Calcium Carb-Cholecalciferol (CALCIUM-VITAMIN D3) 600-400 MG-UNIT tablet, Take 1 tablet by mouth 2 (Two) Times a Day., Disp: , Rfl:   •  carvedilol (COREG) 3.125 MG tablet, Take 3.125 mg by mouth 2 (Two) Times a Day., Disp: , Rfl:     Review of Systems    Review of Systems   Constitutional: Positive for unexpected weight change. Negative for activity change, appetite change, chills, diaphoresis, fatigue and fever.   HENT: Negative for congestion, drooling, ear discharge, ear pain, facial swelling, mouth sores, nosebleeds, postnasal drip, sinus pressure, sneezing, sore throat, tinnitus, trouble swallowing and voice change.    Eyes: Negative.  Negative for photophobia, pain, discharge, redness and itching.   Respiratory: Negative.  Negative for apnea, cough, choking, chest tightness, shortness of breath, wheezing and stridor.    Cardiovascular: Negative.  Negative for chest pain, palpitations and leg swelling.   Gastrointestinal: Negative.  Negative for abdominal distention, abdominal pain, constipation, diarrhea, nausea and vomiting.   Endocrine: Positive for polydipsia, polyphagia and polyuria. Negative for cold intolerance and heat intolerance.   Genitourinary: Negative for difficulty urinating, dysuria, flank pain and frequency.   Musculoskeletal: Positive for arthralgias, back pain and myalgias. Negative for gait problem, joint swelling, neck pain and neck stiffness.   Skin: Negative for color change, pallor, rash and wound.   Allergic/Immunologic: Negative for environmental allergies, food allergies and immunocompromised state.   Neurological: Negative for dizziness, tremors, seizures, syncope, facial asymmetry, speech difficulty, weakness,  "light-headedness, numbness and headaches.   Hematological: Negative for adenopathy. Does not bruise/bleed easily.   Psychiatric/Behavioral: Negative for agitation, behavioral problems, confusion, decreased concentration, dysphoric mood, hallucinations, self-injury, sleep disturbance and suicidal ideas. The patient is not nervous/anxious and is not hyperactive.         Objective:     /80 (BP Location: Right arm, Patient Position: Sitting, Cuff Size: Adult)  Pulse 100  Ht 65\" (165.1 cm)  Wt (!) 414 lb (188 kg)  LMP  (LMP Unknown)  BMI 68.89 kg/m2    Physical Exam   Constitutional: She is oriented to person, place, and time. She appears well-developed.   HENT:   Head: Normocephalic.   Right Ear: External ear normal.   Left Ear: External ear normal.   Nose: Nose normal.   Eyes: Conjunctivae and EOM are normal. No scleral icterus.   Neck: Normal range of motion. Neck supple. No tracheal deviation present. No thyromegaly present.   Cardiovascular: Normal rate, regular rhythm and intact distal pulses.  Exam reveals no gallop and no friction rub.    Murmur heard.  Systolic murmur, carotid bruit    Pulmonary/Chest: Effort normal and breath sounds normal. No stridor. No respiratory distress. She has no wheezes. She has no rales. She exhibits no tenderness.   Abdominal: Soft. Bowel sounds are normal. She exhibits no distension and no mass. There is no tenderness. There is no rebound and no guarding.   Musculoskeletal: Normal range of motion. She exhibits no tenderness or deformity.   Lymphadenopathy:     She has no cervical adenopathy.   Neurological: She is alert and oriented to person, place, and time. She displays normal reflexes. She exhibits normal muscle tone. Coordination normal.   Skin: No rash noted. No erythema. No pallor.   Psychiatric: She has a normal mood and affect. Her behavior is normal. Judgment and thought content normal.       Lab Review    Results for orders placed or performed in visit on " 06/14/17   POC Glucose Fingerstick   Result Value Ref Range    Glucose 203 (A) 70 - 130 mg/dL           Assessment/Plan       ICD-10-CM ICD-9-CM   1. Type 2 diabetes mellitus with stage 3 chronic kidney disease, with long-term current use of insulin E11.22 250.40    N18.3 585.3    Z79.4 V58.67   2. Mixed diabetic hyperlipidemia associated with type 2 diabetes mellitus E11.69 250.80    E78.2 272.2   3. Hypertension associated with diabetes E11.59 250.80    I10 401.9   4. Vitamin D deficiency E55.9 268.9   5. B12 deficiency E53.8 266.2       Glycemic Management:   Lab Results   Component Value Date    HGBA1C 6.2 06/08/2017    HGBA1C 5.9 01/30/2017    HGBA1C 6.0 10/19/2016     Lab Results   Component Value Date    GLUCOSE 177 (H) 06/14/2017    BUN 33 (H) 06/14/2017    CREATININE 1.80 (H) 06/14/2017    EGFRIFNONA 29 (L) 06/14/2017    BCR 18.3 06/14/2017    CO2 22.0 06/14/2017    CALCIUM 9.2 06/14/2017    ALBUMIN 3.80 06/14/2017    LABIL2 1.1 06/14/2017    AST 22 06/14/2017    ALT 21 06/14/2017     Lab Results   Component Value Date    WBC 9.50 06/14/2017    HGB 9.9 (L) 06/14/2017    HCT 31.6 (L) 06/14/2017    MCV 95.2 06/14/2017     06/14/2017       Trulicity 1.5 mg weekly       Humulin U 500     for bkfast 0.15  For lunch and supper 0.08 to 0.13    May take lantus up to [facility]     Lipid Management    Lab Results   Component Value Date    TRIG 367 (H) 06/08/2017    TRIG 293 (H) 01/30/2017    TRIG 225 (H) 10/19/2016     Lab Results   Component Value Date    HDL 33 (L) 06/08/2017    HDL 36 (L) 01/30/2017    HDL 38 (L) 10/19/2016     Lab Results   Component Value Date    LDLCALC 65 06/08/2017    LDLCALC 83 01/30/2017    LDLCALC 80 10/19/2016     Lab Results   Component Value Date    LDL 79 07/14/2016    LDL 90 04/14/2016       Lab Results   Component Value Date    LDLDIRECT 94 12/09/2015    LDLDIRECT 73 08/05/2015    LDLDIRECT 88 04/08/2015             on Crestor 20 mg daily   Generic causing myalgias    Return to  brand name       Blood Pressure Management:    Vitals:    06/14/17 1312   BP: 140/80   Pulse: 100         Per nephrology       Microvascular Complication Monitoring:  No Microalbuminuria, No Diabetic Retinopathy, Date of last eye exam 07/18/2016, No Diabetic Neuropathy  on ACE i     ckd stage III-IV    followed by nephrology     --        Immunizations:  Last pneumococcal immunization pneumovax before age 65     Flu Shot will have in Mid Nov 2016       Preventive Care:  No smoking      Weight Related:   Wt Readings from Last 3 Encounters:   06/14/17 (!) 414 lb (188 kg)   06/14/17 (!) 430 lb (195 kg)   03/22/17 (!) 396 lb (180 kg)     Body mass index is 68.89 kg/(m^2).      prefers no bariatric surgery    Now on cpap       Bone Health    Lab Results   Component Value Date    CALCIUM 9.2 06/14/2017     On rocatrol per nephro for JARETH       Thyroid Health  Lab Results   Component Value Date    TSH 3.090 06/08/2017     On levothyroxine 25 µg daily , increase to 50 ug daily    Other Diabetes Related Aspects       Lab Results   Component Value Date    [id number] 727 06/08/2017       Anemia , managed by nephrology   Deciding vs epo vs iron       Systolic Murmur, AS? Echo   Could be due to anemia     I reviewed and summarized records from Ole Soliman MD from 2016 and I reviewed / ordered labs.     Orders Placed This Encounter   Procedures   • POC Glucose Fingerstick         A copy of my note was sent to Ole Soliman MD    Please see my above opinion and suggestions.

## 2017-06-14 NOTE — PROGRESS NOTES
[facility]  HEMATOLOGY & ONCOLOGY        Subjective     VISIT DIAGNOSIS:   No diagnosis found.    REASON FOR VISIT:   No chief complaint on file.       HEMATOLOGY / ONCOLOGY HISTORY:Ms. Shrestha is a 55 year old female with past medical history of hypertension, chronic kidney disease, diabetes insulindependent  type 2   No history exists.     [No treatment plan]  Cancer Staging Information:  No matching staging information was found for the patient.      INTERVAL HISTORY  Patient ID: Maria C Shrestha is a 57 y.o. year old female   Anemia secondary to chronic kidney disease, Stage III. Patient has not required VALERIE therapy this far.  2. Iron deficiency in the setting of chronic kidney disease, has benefitted from Feraheme. Had Feraheme in January, hemoglobin has  now near normalized at 10.7        Review of Systems   Constitutional: Negative.    HENT: Negative.    Eyes: Negative.    Respiratory: Negative.    Cardiovascular: Negative.    Gastrointestinal: Negative.    Endocrine: Negative.    Genitourinary: Negative.    Musculoskeletal: Negative.    Skin: Negative.    Allergic/Immunologic: Negative.    Neurological: Negative.    Hematological: Negative.    Psychiatric/Behavioral: Negative.             Medications:    Current Outpatient Prescriptions   Medication Sig Dispense Refill   • allopurinol (ZYLOPRIM) 100 MG tablet Take 100 mg by mouth 2 (Two) Times a Day.     • amLODIPine (NORVASC) 5 MG tablet Take 5 mg by mouth Daily.     • aspirin 81 MG tablet Take 81 mg by mouth Daily.     • B-D ULTRA-FINE 33 LANCETS misc Use 4 x daily 120 each 11   • busPIRone (BUSPAR) 10 MG tablet Take 10 mg by mouth 2 (Two) Times a Day.     • calcitriol (ROCALTROL) 0.25 MCG capsule Take 0.25 mcg by mouth 3 (Three) Times a Week.     • Calcium Carb-Cholecalciferol (CALCIUM-VITAMIN D3) 600-400 MG-UNIT tablet Take 1 tablet by mouth 2 (Two) Times a Day.     • carvedilol (COREG) 3.125 MG tablet Take 3.125 mg by mouth 2 (Two) Times a  "Day.     • cetirizine (zyrTEC) 10 MG tablet Take 10 mg by mouth As Needed.     • cholecalciferol (VITAMIN D3) 11207 UNITS capsule Take 50,000 Units by mouth Every 7 (Seven) Days.     • citalopram (CeleXA) 20 MG tablet Take 40 mg by mouth Daily.     • Dulaglutide (TRULICITY) 1.5 MG/0.5ML solution pen-injector Inject 1.5 mg under the skin Every 7 (Seven) Days. 12 pen 3   • ferrous sulfate 325 (65 FE) MG tablet Take 1 tablet by mouth Daily With Breakfast. (Patient taking differently: Take 325 mg by mouth 2 (Two) Times a Day.) 60 tablet 3   • furosemide (LASIX) 40 MG tablet Take 1 tablet by mouth Daily. 90 tablet 3   • Glucose Blood (BLOOD GLUCOSE TEST) strip Use 4 x daily, use any brand covered by insurance or same brand as before 120 each 11   • insulin glargine (LANTUS) 100 UNIT/ML injection Inject up to 100 u daily (Patient taking differently: Inject 192 Units under the skin Every Night. Inject up to 100 u daily) 9 each 3   • insulin regular (HUMULIN R) 500 UNIT/ML CONCENTRATED injection Use up to 0.30 ml four times daily 5 vial 3   • Insulin Syringe 31G X 5/16\" 1 ML misc 4 x daily 120 each 11   • Insulin Syringe-Needle U-100 31G X 5/16\" 0.3 ML misc use as indicated 4 times daily. 120 each 11   • lamoTRIgine (LaMICtal) 50 MG tablet dispersible disintegrating tablet Take 50 mg by mouth Daily.     • levothyroxine (SYNTHROID) 50 MCG tablet Take 1 tablet by mouth Daily. 90 tablet 3   • lisinopril (PRINIVIL,ZESTRIL) 20 MG tablet Take 20 mg by mouth 2 (Two) Times a Day.     • Multiple Vitamins-Minerals (MULTIVITAMIN ADULT PO) Take 1 tablet by mouth Daily.     • potassium gluconate 595 MG tablet tablet Take 595 mg by mouth 2 (Two) Times a Day.     • rosuvastatin (CRESTOR) 10 MG tablet Take 10 mg by mouth Every Other Day.     • sodium bicarbonate 650 MG tablet Take 650 mg by mouth 3 (Three) Times a Day.       No current facility-administered medications for this visit.        ALLERGIES:    Allergies   Allergen Reactions   • " Codeine Nausea Only       Objective      @VITALS    Current Status 3/15/2017   ECOG score 2       General Appearance: Patient is awake, alert, oriented and in no acute distress. Patient is welldeveloped, wellnourished, and appears stated age.  HEENT: Normocephalic. Sclerae clear, conjunctiva pink, extraocular movements intact, pupils, round, reactive to light and  accommodation. Mouth and throat are clear with moist oral mucosa.  NECK: Supple, no jugular venous distention, thyroid not enlarged.  LYMPH: No cervical, supraclavicular, axillary, or inguinal lymphadenopathy.  CHEST: Equal bilateral expansion, AP  diameter normal, resonant percussion note  LUNGS: Good air movement, no rales, rhonchi, rubs or wheezes with auscultation  CARDIO: Regular sinus rhythm, no murmurs, gallops or rubs.  ABDOMEN: Nondistended, soft, No tenderness, no guarding, no rebound, No hepatosplenomegaly. No abdominal masses. Bowel sounds positive. No hernia  GENITALIA: Not examined.  BREASTS: Not examined.  MUSKEL: No joint swelling, decreased motion, or inflammation  EXTREMS: No edema, clubbing, cyanosis, No varicose veins.  NEURO: Grossly nonfocal, Gait is coordinated and smooth, Cognition is preserved.  SKIN: No rashes, no ecchymoses, no petechia.  PSYCH: Oriented to time, place and person. Memory is preserved. Mood and affect appear normal      RECENT LABS:  No visits with results within 7 Day(s) from this visit.  Latest known visit with results is:    Admission on 03/22/2017, Discharged on 03/22/2017   Component Date Value Ref Range Status   • Glucose 03/22/2017 98  70 - 130 mg/dL Final    : [id number] Esequiel Abigail ID: [id number]   • Case Report 03/22/2017    Final                    Value:Surgical Pathology Report                         Case: [id number]                                  Authorizing Provider:  Mono Linder MD       Collected:           03/22/2017 08:39 AM          Ordering Location:     [facility]  [place]     Received:            03/22/2017 10:45 AM                                 ENDOSCOPY                                                                    Pathologist:           Malou Cantor MD                                                           Specimens:   1) - Large Intestine, Transverse Colon, Polyp at proximal transverse colon                          2) - Large Intestine, Sigmoid Colon, Polyp at distal sigmoid colon                                  3) - Large Intestine, Rectum, Biopsy of rectal papilla                                    • Clinical Information 03/22/2017    Final                    Value:This result contains rich text formatting which cannot be displayed here.   • Final Diagnosis 03/22/2017    Final                    Value:This result contains rich text formatting which cannot be displayed here.   • Gross Description 03/22/2017    Final                    Value:This result contains rich text formatting which cannot be displayed here.   • Microscopic Description 03/22/2017    Final                    Value:This result contains rich text formatting which cannot be displayed here.       RADIOLOGY:  No results found.         Assessment/Plan        Anemia secondary to chronic kidney disease, Stage III. Patient has not required VALERIE therapy this far.  2. Iron deficiency in the setting of chronic kidney disease, has benefitted from Feraheme. Had Feraheme in January, hemoglobin has  now near normalized at 10.3.  Plan continue watchful waiting return to clinic in 3 months time and check another CBC and iron studies.  If the iron is still low I may give additional intravenous iron.  Her CMS guidelines I cannot give her Procrit unless hemoglobin is less than 10.    Clinically patient asymptomatic.  I will redraw her iron studies today.  Hemoglobin is a 10.  Return to clinic in 3 months time.            Marco A Melo MD    6/14/2017    9:00 AM             [facility]  GROUP  HEMATOLOGY & ONCOLOGY        Subjective     VISIT DIAGNOSIS:   No diagnosis found.    REASON FOR VISIT:   No chief complaint on file.       HEMATOLOGY / ONCOLOGY HISTORY:Ms. Shrestha is a 55 year old female with past medical history of hypertension, chronic kidney disease, diabetes insulindependent  type 2   No history exists.     [No treatment plan]  Cancer Staging Information:  No matching staging information was found for the patient.      INTERVAL HISTORY  Patient ID: Maria C Shrestha is a 57 y.o. year old female   Anemia secondary to chronic kidney disease, Stage III. Patient has not required VALERIE therapy this far.  2. Iron deficiency in the setting of chronic kidney disease, has benefitted from Feraheme. Had Feraheme in January, hemoglobin has  now near normalized at 10.7        Review of Systems   Constitutional: Negative.    HENT: Negative.    Eyes: Negative.    Respiratory: Negative.    Cardiovascular: Negative.    Gastrointestinal: Negative.    Endocrine: Negative.    Genitourinary: Negative.    Musculoskeletal: Negative.    Skin: Negative.    Allergic/Immunologic: Negative.    Neurological: Negative.    Hematological: Negative.    Psychiatric/Behavioral: Negative.             Medications:    Current Outpatient Prescriptions   Medication Sig Dispense Refill   • allopurinol (ZYLOPRIM) 100 MG tablet Take 100 mg by mouth 2 (Two) Times a Day.     • amLODIPine (NORVASC) 5 MG tablet Take 5 mg by mouth Daily.     • aspirin 81 MG tablet Take 81 mg by mouth Daily.     • B-D ULTRA-FINE 33 LANCETS misc Use 4 x daily 120 each 11   • busPIRone (BUSPAR) 10 MG tablet Take 10 mg by mouth 2 (Two) Times a Day.     • calcitriol (ROCALTROL) 0.25 MCG capsule Take 0.25 mcg by mouth 3 (Three) Times a Week.     • Calcium Carb-Cholecalciferol (CALCIUM-VITAMIN D3) 600-400 MG-UNIT tablet Take 1 tablet by mouth 2 (Two) Times a Day.     • carvedilol (COREG) 3.125 MG tablet Take 3.125 mg by mouth 2 (Two) Times a Day.     • cetirizine  "(zyrTEC) 10 MG tablet Take 10 mg by mouth As Needed.     • cholecalciferol (VITAMIN D3) 04524 UNITS capsule Take 50,000 Units by mouth Every 7 (Seven) Days.     • citalopram (CeleXA) 20 MG tablet Take 40 mg by mouth Daily.     • Dulaglutide (TRULICITY) 1.5 MG/0.5ML solution pen-injector Inject 1.5 mg under the skin Every 7 (Seven) Days. 12 pen 3   • ferrous sulfate 325 (65 FE) MG tablet Take 1 tablet by mouth Daily With Breakfast. (Patient taking differently: Take 325 mg by mouth 2 (Two) Times a Day.) 60 tablet 3   • furosemide (LASIX) 40 MG tablet Take 1 tablet by mouth Daily. 90 tablet 3   • Glucose Blood (BLOOD GLUCOSE TEST) strip Use 4 x daily, use any brand covered by insurance or same brand as before 120 each 11   • insulin glargine (LANTUS) 100 UNIT/ML injection Inject up to 100 u daily (Patient taking differently: Inject 192 Units under the skin Every Night. Inject up to 100 u daily) 9 each 3   • insulin regular (HUMULIN R) 500 UNIT/ML CONCENTRATED injection Use up to 0.30 ml four times daily 5 vial 3   • Insulin Syringe 31G X 5/16\" 1 ML misc 4 x daily 120 each 11   • Insulin Syringe-Needle U-100 31G X 5/16\" 0.3 ML misc use as indicated 4 times daily. 120 each 11   • lamoTRIgine (LaMICtal) 50 MG tablet dispersible disintegrating tablet Take 50 mg by mouth Daily.     • levothyroxine (SYNTHROID) 50 MCG tablet Take 1 tablet by mouth Daily. 90 tablet 3   • lisinopril (PRINIVIL,ZESTRIL) 20 MG tablet Take 20 mg by mouth 2 (Two) Times a Day.     • Multiple Vitamins-Minerals (MULTIVITAMIN ADULT PO) Take 1 tablet by mouth Daily.     • potassium gluconate 595 MG tablet tablet Take 595 mg by mouth 2 (Two) Times a Day.     • rosuvastatin (CRESTOR) 10 MG tablet Take 10 mg by mouth Every Other Day.     • sodium bicarbonate 650 MG tablet Take 650 mg by mouth 3 (Three) Times a Day.       No current facility-administered medications for this visit.        ALLERGIES:    Allergies   Allergen Reactions   • Codeine Nausea Only "       Objective      @VITALS    Current Status 3/15/2017   ECOG score 2       General Appearance: Patient is awake, alert, oriented and in no acute distress. Patient is welldeveloped, wellnourished, and appears stated age.  HEENT: Normocephalic. Sclerae clear, conjunctiva pink, extraocular movements intact, pupils, round, reactive to light and  accommodation. Mouth and throat are clear with moist oral mucosa.  NECK: Supple, no jugular venous distention, thyroid not enlarged.  LYMPH: No cervical, supraclavicular, axillary, or inguinal lymphadenopathy.  CHEST: Equal bilateral expansion, AP  diameter normal, resonant percussion note  LUNGS: Good air movement, no rales, rhonchi, rubs or wheezes with auscultation  CARDIO: Regular sinus rhythm, no murmurs, gallops or rubs.  ABDOMEN: Nondistended, soft, No tenderness, no guarding, no rebound, No hepatosplenomegaly. No abdominal masses. Bowel sounds positive. No hernia  GENITALIA: Not examined.  BREASTS: Not examined.  MUSKEL: No joint swelling, decreased motion, or inflammation  EXTREMS: No edema, clubbing, cyanosis, No varicose veins.  NEURO: Grossly nonfocal, Gait is coordinated and smooth, Cognition is preserved.  SKIN: No rashes, no ecchymoses, no petechia.  PSYCH: Oriented to time, place and person. Memory is preserved. Mood and affect appear normal      RECENT LABS:  No visits with results within 7 Day(s) from this visit.  Latest known visit with results is:    Admission on 03/22/2017, Discharged on 03/22/2017   Component Date Value Ref Range Status   • Glucose 03/22/2017 98  70 - 130 mg/dL Final    : [id number] Esequiel Hayes ID: [id number]   • Case Report 03/22/2017    Final                    Value:Surgical Pathology Report                         Case: [id number]                                  Authorizing Provider:  Mono Linder MD       Collected:           03/22/2017 08:39 AM          Ordering Location:     [facility]     Received:             03/22/2017 10:45 AM                                 ENDOSCOPY                                                                    Pathologist:           Malou Cantor MD                                                           Specimens:   1) - Large Intestine, Transverse Colon, Polyp at proximal transverse colon                          2) - Large Intestine, Sigmoid Colon, Polyp at distal sigmoid colon                                  3) - Large Intestine, Rectum, Biopsy of rectal papilla                                    • Clinical Information 03/22/2017    Final                    Value:This result contains rich text formatting which cannot be displayed here.   • Final Diagnosis 03/22/2017    Final                    Value:This result contains rich text formatting which cannot be displayed here.   • Gross Description 03/22/2017    Final                    Value:This result contains rich text formatting which cannot be displayed here.   • Microscopic Description 03/22/2017    Final                    Value:This result contains rich text formatting which cannot be displayed here.       RADIOLOGY:  No results found.         Assessment/Plan        Anemia secondary to chronic kidney disease, Stage III. Patient has not required VALERIE therapy this far.  2. Iron deficiency in the setting of chronic kidney disease, has benefitted from Feraheme. Had Feraheme in January, hemoglobin has  now near normalized at 10.3.  Plan continue watchful waiting return to clinic in 3 months time and check another CBC and iron studies.  If the iron is still low I may give additional intravenous iron.  Her CMS guidelines I cannot give her Procrit unless hemoglobin is less than 10.    Clinically patient asymptomatic.  I will redraw her iron studies today.  Hemoglobin is a 9.9gm Today.  She is clinically exhausted.  Over the last 6 months she has been taking oral iron and despite that the hemoglobin is not improved.  I will recheck her  iron studies today.  If the iron is still low I may consider giving her infusional iron.  However if the iron levels are normal I may just have to start her on erythropoietin exhibiting agents.  She will call back on Friday..            Marco A Melo MD    6/14/2017    9:00 AM

## 2017-06-15 LAB
FERRITIN SERPL-MCNC: 110 NG/ML (ref 11.1–264)
IRON SATN MFR SERPL: 22 % (ref 20–45)
IRON SERPL-MCNC: 63 MCG/DL (ref 42–180)
TIBC SERPL-MCNC: 288 MCG/DL (ref 225–420)
UIBC SERPL-MCNC: 225 MCG/DL

## 2017-06-27 DIAGNOSIS — N18.30 ANEMIA OF CHRONIC RENAL FAILURE, STAGE 3 (MODERATE) (HCC): Primary | ICD-10-CM

## 2017-06-27 DIAGNOSIS — D63.1 ANEMIA OF CHRONIC RENAL FAILURE, STAGE 3 (MODERATE) (HCC): Primary | ICD-10-CM

## 2017-06-28 ENCOUNTER — LAB (OUTPATIENT)
Dept: ONCOLOGY | Facility: CLINIC | Age: 57
End: 2017-06-28

## 2017-06-28 DIAGNOSIS — Z79.4 TYPE 2 DIABETES MELLITUS WITH STAGE 3 CHRONIC KIDNEY DISEASE, WITH LONG-TERM CURRENT USE OF INSULIN (HCC): Primary | ICD-10-CM

## 2017-06-28 DIAGNOSIS — N18.30 ANEMIA OF CHRONIC RENAL FAILURE, STAGE 3 (MODERATE) (HCC): ICD-10-CM

## 2017-06-28 DIAGNOSIS — N18.30 TYPE 2 DIABETES MELLITUS WITH STAGE 3 CHRONIC KIDNEY DISEASE, WITH LONG-TERM CURRENT USE OF INSULIN (HCC): Primary | ICD-10-CM

## 2017-06-28 DIAGNOSIS — E11.22 TYPE 2 DIABETES MELLITUS WITH STAGE 3 CHRONIC KIDNEY DISEASE, WITH LONG-TERM CURRENT USE OF INSULIN (HCC): Primary | ICD-10-CM

## 2017-06-28 DIAGNOSIS — D63.1 ANEMIA OF CHRONIC RENAL FAILURE, STAGE 3 (MODERATE) (HCC): ICD-10-CM

## 2017-06-28 LAB
AUTO MIXED CELLS #: 0.5 10*3/UL (ref 0.1–1.5)
AUTO MIXED CELLS %: 4.7 % (ref 0.2–15.1)
ERYTHROCYTE [DISTWIDTH] IN BLOOD BY AUTOMATED COUNT: 18.3 % (ref 11.5–14.5)
HCT VFR BLD AUTO: 33 % (ref 37–47)
HGB BLD-MCNC: 10.4 G/DL (ref 12–16)
LYMPHOCYTES # BLD AUTO: 1.8 10*3/MM3 (ref 0.8–7)
LYMPHOCYTES NFR BLD AUTO: 16.1 % (ref 10–58.5)
MCH RBC QN AUTO: 30.2 PG (ref 27–31)
MCHC RBC AUTO-ENTMCNC: 31.5 G/DL (ref 33–37)
MCV RBC AUTO: 95.8 FL (ref 81–99)
NEUTROPHILS # BLD AUTO: 8.9 10*3/MM3 (ref 2–7.8)
NEUTROPHILS NFR BLD AUTO: 79.2 % (ref 37–92)
PLATELET # BLD AUTO: 221 10*3/MM3 (ref 130–400)
PMV BLD AUTO: 8 FL (ref 6–12)
RBC # BLD AUTO: 3.44 10*6/MM3 (ref 4.2–5.4)
WBC NRBC COR # BLD: 11.2 10*3/MM3 (ref 4.8–10.8)

## 2017-06-28 PROCEDURE — 85025 COMPLETE CBC W/AUTO DIFF WBC: CPT | Performed by: INTERNAL MEDICINE

## 2017-06-28 PROCEDURE — 36415 COLL VENOUS BLD VENIPUNCTURE: CPT | Performed by: INTERNAL MEDICINE

## 2017-07-12 ENCOUNTER — OFFICE VISIT (OUTPATIENT)
Dept: ONCOLOGY | Facility: CLINIC | Age: 57
End: 2017-07-12

## 2017-07-12 ENCOUNTER — HOSPITAL ENCOUNTER (EMERGENCY)
Age: 57
Discharge: HOME | End: 2017-07-12
Attending: FAMILY MEDICINE
Payer: MEDICARE

## 2017-07-12 ENCOUNTER — LAB (OUTPATIENT)
Dept: ONCOLOGY | Facility: CLINIC | Age: 57
End: 2017-07-12

## 2017-07-12 ENCOUNTER — APPOINTMENT (OUTPATIENT)
Dept: GENERAL RADIOLOGY | Age: 57
End: 2017-07-12
Payer: MEDICARE

## 2017-07-12 VITALS
HEIGHT: 65 IN | SYSTOLIC BLOOD PRESSURE: 130 MMHG | RESPIRATION RATE: 20 BRPM | TEMPERATURE: 97.2 F | BODY MASS INDEX: 48.82 KG/M2 | DIASTOLIC BLOOD PRESSURE: 78 MMHG | HEART RATE: 77 BPM | WEIGHT: 293 LBS | OXYGEN SATURATION: 96 %

## 2017-07-12 VITALS
RESPIRATION RATE: 18 BRPM | OXYGEN SATURATION: 94 % | HEART RATE: 71 BPM | HEIGHT: 65 IN | DIASTOLIC BLOOD PRESSURE: 76 MMHG | SYSTOLIC BLOOD PRESSURE: 139 MMHG | BODY MASS INDEX: 48.82 KG/M2 | TEMPERATURE: 98.8 F | WEIGHT: 293 LBS

## 2017-07-12 DIAGNOSIS — D60.9 ERYTHROID APLASIA (HCC): Primary | ICD-10-CM

## 2017-07-12 DIAGNOSIS — D63.1 ANEMIA IN CKD (CHRONIC KIDNEY DISEASE): Primary | ICD-10-CM

## 2017-07-12 DIAGNOSIS — D64.9 ANEMIA, UNSPECIFIED: ICD-10-CM

## 2017-07-12 DIAGNOSIS — N18.9 ANEMIA IN CKD (CHRONIC KIDNEY DISEASE): Primary | ICD-10-CM

## 2017-07-12 DIAGNOSIS — R09.89 PULMONARY CONGESTION: ICD-10-CM

## 2017-07-12 DIAGNOSIS — R07.89 OTHER CHEST PAIN: Primary | ICD-10-CM

## 2017-07-12 DIAGNOSIS — K59.09 OTHER CONSTIPATION: ICD-10-CM

## 2017-07-12 DIAGNOSIS — R09.1 PLEURISY: ICD-10-CM

## 2017-07-12 LAB
ALBUMIN SERPL-MCNC: 3.5 G/DL (ref 3.5–5.2)
ALBUMIN SERPL-MCNC: 3.9 G/DL (ref 3.5–5)
ALBUMIN/GLOB SERPL: 1.1 G/DL
ALP BLD-CCNC: 65 U/L (ref 35–104)
ALP SERPL-CCNC: 85 U/L (ref 38–126)
ALT SERPL W P-5'-P-CCNC: 20 U/L (ref 9–52)
ALT SERPL-CCNC: 13 U/L (ref 5–33)
AMPHETAMINE SCREEN, URINE: NEGATIVE
ANION GAP SERPL CALCULATED.3IONS-SCNC: 11 MMOL/L
ANION GAP SERPL CALCULATED.3IONS-SCNC: 16 MMOL/L (ref 7–19)
AST SERPL-CCNC: 19 U/L (ref 5–32)
AST SERPL-CCNC: 20 U/L (ref 5–40)
AUTO MIXED CELLS #: 0.5 10*3/MM3 (ref 0.1–1.5)
AUTO MIXED CELLS %: 4.7 % (ref 0.2–15.1)
BACTERIA: NEGATIVE /HPF
BARBITURATE SCREEN URINE: NEGATIVE
BASOPHILS ABSOLUTE: 0 K/UL (ref 0–0.2)
BASOPHILS RELATIVE PERCENT: 0.3 % (ref 0–1)
BENZODIAZEPINE SCREEN, URINE: NEGATIVE
BILIRUB SERPL-MCNC: 0.4 MG/DL (ref 0.2–1.3)
BILIRUB SERPL-MCNC: <0.2 MG/DL (ref 0.2–1.2)
BILIRUBIN URINE: NEGATIVE
BLOOD, URINE: ABNORMAL
BUN BLD-MCNC: 39 MG/DL (ref 7–26)
BUN BLDV-MCNC: 37 MG/DL (ref 6–20)
BUN/CREAT SERPL: 20.5 (ref 7–25)
CALCIUM SERPL-MCNC: 9.1 MG/DL (ref 8.6–10)
CALCIUM SPEC-SCNC: 9.6 MG/DL (ref 8.4–10.2)
CANNABINOID SCREEN URINE: NEGATIVE
CHLORIDE BLD-SCNC: 104 MMOL/L (ref 98–111)
CHLORIDE SERPL-SCNC: 110 MMOL/L (ref 98–107)
CLARITY: CLEAR
CO2 SERPL-SCNC: 23 MMOL/L (ref 22–30)
CO2: 21 MMOL/L (ref 22–29)
COCAINE METABOLITE SCREEN URINE: NEGATIVE
COLOR: YELLOW
CREAT BLD-MCNC: 1.9 MG/DL (ref 0.7–1.4)
CREAT SERPL-MCNC: 1.8 MG/DL (ref 0.5–0.9)
EOSINOPHILS ABSOLUTE: 0.2 K/UL (ref 0–0.6)
EOSINOPHILS RELATIVE PERCENT: 1.7 % (ref 0–5)
EPITHELIAL CELLS, UA: 2 /HPF (ref 0–5)
ERYTHROCYTE [DISTWIDTH] IN BLOOD BY AUTOMATED COUNT: 18.3 % (ref 11.5–14.5)
GFR NON-AFRICAN AMERICAN: 29
GFR SERPL CREATININE-BSD FRML MDRD: 27 ML/MIN/1.73
GLOBULIN UR ELPH-MCNC: 3.6 GM/DL
GLUCOSE BLD-MCNC: 106 MG/DL (ref 75–110)
GLUCOSE BLD-MCNC: 150 MG/DL (ref 74–109)
GLUCOSE URINE: NEGATIVE MG/DL
HCT VFR BLD AUTO: 32.8 % (ref 37–47)
HCT VFR BLD CALC: 31 % (ref 37–47)
HEMOGLOBIN: 9.8 G/DL (ref 12–16)
HGB BLD-MCNC: 10.4 G/DL (ref 12–16)
HYALINE CASTS: 1 /HPF (ref 0–8)
KETONES, URINE: NEGATIVE MG/DL
LEUKOCYTE ESTERASE, URINE: NEGATIVE
LYMPHOCYTES # BLD AUTO: 2.1 10*3/MM3 (ref 0.8–7)
LYMPHOCYTES ABSOLUTE: 1.7 K/UL (ref 1.1–4.5)
LYMPHOCYTES NFR BLD AUTO: 20.1 % (ref 10–58.5)
LYMPHOCYTES RELATIVE PERCENT: 15.3 % (ref 20–40)
Lab: NORMAL
MCH RBC QN AUTO: 30.2 PG (ref 27–31)
MCH RBC QN AUTO: 30.2 PG (ref 27–31)
MCHC RBC AUTO-ENTMCNC: 31.6 G/DL (ref 33–37)
MCHC RBC AUTO-ENTMCNC: 31.7 G/DL (ref 33–37)
MCV RBC AUTO: 95.3 FL (ref 81–99)
MCV RBC AUTO: 95.4 FL (ref 81–99)
MONOCYTES ABSOLUTE: 0.5 K/UL (ref 0–0.9)
MONOCYTES RELATIVE PERCENT: 4.5 % (ref 0–10)
NEUTROPHILS # BLD AUTO: 7.7 10*3/MM3 (ref 2–7.8)
NEUTROPHILS ABSOLUTE: 8.4 K/UL (ref 1.5–7.5)
NEUTROPHILS NFR BLD AUTO: 75.2 % (ref 37–92)
NEUTROPHILS RELATIVE PERCENT: 77.1 % (ref 50–65)
NITRITE, URINE: NEGATIVE
OPIATE SCREEN URINE: NEGATIVE
PDW BLD-RTO: 16.8 % (ref 11.5–14.5)
PH UA: 5.5
PLATELET # BLD AUTO: 229 10*3/MM3 (ref 130–400)
PLATELET # BLD: 211 K/UL (ref 130–400)
PMV BLD AUTO: 10.3 FL (ref 9.4–12.3)
PMV BLD AUTO: 8.5 FL (ref 6–12)
POTASSIUM BLD-SCNC: 4.1 MMOL/L (ref 3.6–5)
POTASSIUM SERPL-SCNC: 4.5 MMOL/L (ref 3.5–5)
PRO-BNP: 208 PG/ML (ref 0–900)
PROT SERPL-MCNC: 7.5 G/DL (ref 6.3–8.2)
PROTEIN UA: 100 MG/DL
RBC # BLD AUTO: 3.44 10*6/MM3 (ref 4.2–5.4)
RBC # BLD: 3.25 M/UL (ref 4.2–5.4)
RBC UA: 1 /HPF (ref 0–4)
SODIUM BLD-SCNC: 141 MMOL/L (ref 136–145)
SODIUM BLD-SCNC: 144 MMOL/L (ref 137–145)
SPECIFIC GRAVITY UA: 1.01
T4 FREE: 1.1 NG/ML (ref 0.9–1.7)
TOTAL PROTEIN: 6.5 G/DL (ref 6.6–8.7)
TROPONIN: <0.01 NG/ML (ref 0–0.03)
TSH SERPL DL<=0.05 MIU/L-ACNC: 2.97 UIU/ML (ref 0.27–4.2)
UROBILINOGEN, URINE: 0.2 E.U./DL
WBC # BLD: 11 K/UL (ref 4.8–10.8)
WBC NRBC COR # BLD: 10.2 10*3/MM3 (ref 4.8–10.8)
WBC UA: 2 /HPF (ref 0–5)

## 2017-07-12 PROCEDURE — 6370000000 HC RX 637 (ALT 250 FOR IP): Performed by: FAMILY MEDICINE

## 2017-07-12 PROCEDURE — 83880 ASSAY OF NATRIURETIC PEPTIDE: CPT

## 2017-07-12 PROCEDURE — 99284 EMERGENCY DEPT VISIT MOD MDM: CPT | Performed by: FAMILY MEDICINE

## 2017-07-12 PROCEDURE — 74022 RADEX COMPL AQT ABD SERIES: CPT

## 2017-07-12 PROCEDURE — 84439 ASSAY OF FREE THYROXINE: CPT

## 2017-07-12 PROCEDURE — 99285 EMERGENCY DEPT VISIT HI MDM: CPT

## 2017-07-12 PROCEDURE — 85025 COMPLETE CBC W/AUTO DIFF WBC: CPT | Performed by: INTERNAL MEDICINE

## 2017-07-12 PROCEDURE — 84484 ASSAY OF TROPONIN QUANT: CPT

## 2017-07-12 PROCEDURE — 6360000002 HC RX W HCPCS: Performed by: FAMILY MEDICINE

## 2017-07-12 PROCEDURE — 93005 ELECTROCARDIOGRAM TRACING: CPT

## 2017-07-12 PROCEDURE — 85025 COMPLETE CBC W/AUTO DIFF WBC: CPT

## 2017-07-12 PROCEDURE — 80053 COMPREHEN METABOLIC PANEL: CPT | Performed by: INTERNAL MEDICINE

## 2017-07-12 PROCEDURE — 80053 COMPREHEN METABOLIC PANEL: CPT

## 2017-07-12 PROCEDURE — 36415 COLL VENOUS BLD VENIPUNCTURE: CPT | Performed by: INTERNAL MEDICINE

## 2017-07-12 PROCEDURE — 96374 THER/PROPH/DIAG INJ IV PUSH: CPT

## 2017-07-12 PROCEDURE — 2580000003 HC RX 258: Performed by: FAMILY MEDICINE

## 2017-07-12 PROCEDURE — 36415 COLL VENOUS BLD VENIPUNCTURE: CPT

## 2017-07-12 PROCEDURE — 80307 DRUG TEST PRSMV CHEM ANLYZR: CPT

## 2017-07-12 PROCEDURE — 99214 OFFICE O/P EST MOD 30 MIN: CPT | Performed by: INTERNAL MEDICINE

## 2017-07-12 PROCEDURE — 84443 ASSAY THYROID STIM HORMONE: CPT

## 2017-07-12 RX ORDER — CLONIDINE HYDROCHLORIDE 0.1 MG/1
0.1 TABLET ORAL ONCE
Status: COMPLETED | OUTPATIENT
Start: 2017-07-12 | End: 2017-07-12

## 2017-07-12 RX ORDER — FUROSEMIDE 20 MG/1
20 TABLET ORAL DAILY
Qty: 15 TABLET | Refills: 0 | Status: SHIPPED | OUTPATIENT
Start: 2017-07-12 | End: 2021-06-16 | Stop reason: ALTCHOICE

## 2017-07-12 RX ORDER — 0.9 % SODIUM CHLORIDE 0.9 %
1000 INTRAVENOUS SOLUTION INTRAVENOUS ONCE
Status: COMPLETED | OUTPATIENT
Start: 2017-07-12 | End: 2017-07-12

## 2017-07-12 RX ORDER — SODIUM PHOSPHATE, DIBASIC AND SODIUM PHOSPHATE, MONOBASIC 7; 19 G/133ML; G/133ML
1 ENEMA RECTAL
Qty: 1 BOTTLE | Refills: 0 | Status: SHIPPED | OUTPATIENT
Start: 2017-07-12 | End: 2017-07-12

## 2017-07-12 RX ORDER — FUROSEMIDE 10 MG/ML
20 INJECTION INTRAMUSCULAR; INTRAVENOUS ONCE
Status: COMPLETED | OUTPATIENT
Start: 2017-07-12 | End: 2017-07-12

## 2017-07-12 RX ORDER — MAGNESIUM CARB/ALUMINUM HYDROX 105-160MG
296 TABLET,CHEWABLE ORAL DAILY PRN
Qty: 2 BOTTLE | Refills: 0 | Status: SHIPPED | OUTPATIENT
Start: 2017-07-12 | End: 2017-07-14

## 2017-07-12 RX ADMIN — CLONIDINE HYDROCHLORIDE 0.1 MG: 0.1 TABLET ORAL at 10:22

## 2017-07-12 RX ADMIN — SODIUM CHLORIDE 1000 ML: 9 INJECTION, SOLUTION INTRAVENOUS at 10:23

## 2017-07-12 RX ADMIN — FUROSEMIDE 20 MG: 10 INJECTION INTRAMUSCULAR; INTRAVENOUS at 14:24

## 2017-07-12 ASSESSMENT — ENCOUNTER SYMPTOMS
ABDOMINAL PAIN: 0
COUGH: 0
ORTHOPNEA: 0
TROUBLE SWALLOWING: 0
BACK PAIN: 0
HEARTBURN: 0
SHORTNESS OF BREATH: 0
NAUSEA: 0
VOMITING: 0

## 2017-07-12 NOTE — PROGRESS NOTES
[facility]  HEMATOLOGY & ONCOLOGY        Subjective     VISIT DIAGNOSIS:   No diagnosis found.    REASON FOR VISIT:     Chief Complaint   Patient presents with   • Follow-up     Anemia CKD: She is here for f/u visit today and to review her lab work today. She c/o fatigue and tiredness        HEMATOLOGY / ONCOLOGY HISTORY:Ms. Shrestha is a 55 year old female with past medical history of hypertension, chronic kidney disease, diabetes insulindependent  type 2   No history exists.     [No treatment plan]  Cancer Staging Information:  No matching staging information was found for the patient.      INTERVAL HISTORY  Patient ID: Maria C Shrestha is a 57 y.o. year old female   Anemia secondary to chronic kidney disease, Stage III. Patient has not required VALERIE therapy this far.  2. Iron deficiency in the setting of chronic kidney disease, has benefitted from Feraheme. Had Feraheme in January, hemoglobin has  now near normalized at 10.7        Review of Systems   Constitutional: Negative.    HENT: Negative.    Eyes: Negative.    Respiratory: Negative.    Cardiovascular: Negative.    Gastrointestinal: Negative.    Endocrine: Negative.    Genitourinary: Negative.    Musculoskeletal: Negative.    Skin: Negative.    Allergic/Immunologic: Negative.    Neurological: Negative.    Hematological: Negative.    Psychiatric/Behavioral: Negative.             Medications:    Current Outpatient Prescriptions   Medication Sig Dispense Refill   • allopurinol (ZYLOPRIM) 100 MG tablet Take 100 mg by mouth 2 (Two) Times a Day.     • aspirin 81 MG tablet Take 81 mg by mouth Daily.     • B-D ULTRA-FINE 33 LANCETS misc Use 4 x daily 120 each 11   • busPIRone (BUSPAR) 10 MG tablet Take 10 mg by mouth 2 (Two) Times a Day.     • calcitriol (ROCALTROL) 0.25 MCG capsule Take 0.25 mcg by mouth 3 (Three) Times a Week.     • Calcium Carb-Cholecalciferol (CALCIUM-VITAMIN D3) 600-400 MG-UNIT tablet Take 1 tablet by mouth 2 (Two) Times a Day.    "  • carvedilol (COREG) 3.125 MG tablet Take 3.125 mg by mouth 2 (Two) Times a Day.     • cetirizine (zyrTEC) 10 MG tablet Take 10 mg by mouth As Needed.     • cholecalciferol (VITAMIN D3) 41103 UNITS capsule Take 50,000 Units by mouth Every 7 (Seven) Days.     • citalopram (CeleXA) 20 MG tablet Take 40 mg by mouth Daily.     • Dulaglutide (TRULICITY) 1.5 MG/0.5ML solution pen-injector Inject 1.5 mg under the skin Every 7 (Seven) Days. 12 pen 3   • ferrous sulfate 325 (65 FE) MG tablet Take 1 tablet by mouth Daily With Breakfast. (Patient taking differently: Take 325 mg by mouth 2 (Two) Times a Day.) 60 tablet 3   • furosemide (LASIX) 40 MG tablet Take 1 tablet by mouth Daily. 90 tablet 3   • Glucose Blood (BLOOD GLUCOSE TEST) strip Use 4 x daily, use any brand covered by insurance or same brand as before 120 each 11   • insulin glargine (LANTUS) 100 UNIT/ML injection Inject up to 100 u daily (Patient taking differently: Inject 192 Units under the skin Every Night. Inject up to 100 u daily) 9 each 3   • Insulin Syringe 31G X 5/16\" 1 ML misc 4 x daily 120 each 11   • Insulin Syringe-Needle U-100 31G X 5/16\" 0.3 ML misc use as indicated 4 times daily. 120 each 11   • lamoTRIgine (LaMICtal) 50 MG tablet dispersible disintegrating tablet Take 50 mg by mouth Daily.     • levothyroxine (SYNTHROID) 50 MCG tablet Take 1 tablet by mouth Daily. 90 tablet 3   • lisinopril (PRINIVIL,ZESTRIL) 20 MG tablet Take 20 mg by mouth 2 (Two) Times a Day.     • Multiple Vitamins-Minerals (MULTIVITAMIN ADULT PO) Take 1 tablet by mouth Daily.     • potassium gluconate 595 MG tablet tablet Take 595 mg by mouth 2 (Two) Times a Day.     • rosuvastatin (CRESTOR) 10 MG tablet Take 10 mg by mouth Every Other Day.     • sodium bicarbonate 650 MG tablet Take 650 mg by mouth 3 (Three) Times a Day.       No current facility-administered medications for this visit.        ALLERGIES:    Allergies   Allergen Reactions   • Codeine Nausea Only "       Objective      @VITALS    Current Status 7/12/2017   ECOG score 2       General Appearance: Patient is awake, alert, oriented and in no acute distress. Patient is welldeveloped, wellnourished, and appears stated age.  HEENT: Normocephalic. Sclerae clear, conjunctiva pink, extraocular movements intact, pupils, round, reactive to light and  accommodation. Mouth and throat are clear with moist oral mucosa.  NECK: Supple, no jugular venous distention, thyroid not enlarged.  LYMPH: No cervical, supraclavicular, axillary, or inguinal lymphadenopathy.  CHEST: Equal bilateral expansion, AP  diameter normal, resonant percussion note  LUNGS: Good air movement, no rales, rhonchi, rubs or wheezes with auscultation  CARDIO: Regular sinus rhythm, no murmurs, gallops or rubs.  ABDOMEN: Nondistended, soft, No tenderness, no guarding, no rebound, No hepatosplenomegaly. No abdominal masses. Bowel sounds positive. No hernia  GENITALIA: Not examined.  BREASTS: Not examined.  MUSKEL: No joint swelling, decreased motion, or inflammation  EXTREMS: No edema, clubbing, cyanosis, No varicose veins.  NEURO: Grossly nonfocal, Gait is coordinated and smooth, Cognition is preserved.  SKIN: No rashes, no ecchymoses, no petechia.  PSYCH: Oriented to time, place and person. Memory is preserved. Mood and affect appear normal      RECENT LABS:  Orders Only on 07/12/2017   Component Date Value Ref Range Status   • Glucose 07/12/2017 106  75 - 110 mg/dL Final   • BUN 07/12/2017 39* 7 - 26 mg/dL Final   • Creatinine 07/12/2017 1.90* 0.70 - 1.40 mg/dL Final   • Sodium 07/12/2017 144  137 - 145 mmol/L Final   • Potassium 07/12/2017 4.1  3.6 - 5.0 mmol/L Final   • Chloride 07/12/2017 110* 98 - 107 mmol/L Final   • CO2 07/12/2017 23.0  22.0 - 30.0 mmol/L Final   • Calcium 07/12/2017 9.6  8.4 - 10.2 mg/dL Final   • Total Protein 07/12/2017 7.5  6.3 - 8.2 g/dL Final   • Albumin 07/12/2017 3.90  3.50 - 5.00 g/dL Final   • ALT (SGPT) 07/12/2017 20  9 - 52  U/L Final   • AST (SGOT) 07/12/2017 20  5 - 40 U/L Final   • Alkaline Phosphatase 07/12/2017 85  38 - 126 U/L Final   • Total Bilirubin 07/12/2017 0.4  0.2 - 1.3 mg/dL Final   • eGFR Non African Amer 07/12/2017 27* >60 mL/min/1.73 Final   • Globulin 07/12/2017 3.6  gm/dL Final   • A/G Ratio 07/12/2017 1.1  g/dL Final   • BUN/Creatinine Ratio 07/12/2017 20.5  7.0 - 25.0 Final   • Anion Gap 07/12/2017 11.0  mmol/L Final   • WBC 07/12/2017 10.20  4.80 - 10.80 10*3/mm3 Final   • RBC 07/12/2017 3.44* 4.20 - 5.40 10*6/mm3 Final   • Hemoglobin 07/12/2017 10.4* 12.0 - 16.0 g/dL Final   • Hematocrit 07/12/2017 32.8* 37.0 - 47.0 % Final   • MCV 07/12/2017 95.3  81.0 - 99.0 fL Final   • MCH 07/12/2017 30.2  27.0 - 31.0 pg Final   • MCHC 07/12/2017 31.7* 33.0 - 37.0 g/dL Final   • RDW 07/12/2017 18.3* 11.5 - 14.5 % Final   • MPV 07/12/2017 8.5  6.0 - 12.0 fL Final   • Platelets 07/12/2017 229  130 - 400 10*3/mm3 Final   • Neutrophil % 07/12/2017 75.2  37.0 - 92.0 % Final   • Lymphocyte % 07/12/2017 20.1  10.0 - 58.5 % Final   • Auto Mixed Cells % 07/12/2017 4.7  0.2 - 15.1 % Final   • Neutrophils, Absolute 07/12/2017 7.70  2.00 - 7.80 10*3/mm3 Final   • Lymphocytes, Absolute 07/12/2017 2.10  0.80 - 7.00 10*3/mm3 Final   • Auto Mixed Cells # 07/12/2017 0.50  0.10 - 1.50 10*3/mm3 Final       RADIOLOGY:  No results found.         Assessment/Plan        Anemia secondary to chronic kidney disease, Stage III. Patient has not required VALERIE therapy this far.  2. Iron deficiency in the setting of chronic kidney disease, has benefitted from Feraheme. Had Feraheme in January, hemoglobin has  now near normalized at 10.3.  Plan continue watchful waiting return to clinic in 3 months time and check another CBC and iron studies.  If the iron is still low I may give additional intravenous iron.  Her CMS guidelines I cannot give her Procrit unless hemoglobin is less than 10.    Clinically patient asymptomatic.  I will redraw her iron studies  today.  Hemoglobin is a 10.  Return to clinic in 3 months time.            Marco A Melo MD    7/12/2017    8:22 AM             [facility]  HEMATOLOGY & ONCOLOGY        Subjective     VISIT DIAGNOSIS:   No diagnosis found.    REASON FOR VISIT:     Chief Complaint   Patient presents with   • Follow-up     Anemia CKD: She is here for f/u visit today and to review her lab work today. She c/o fatigue and tiredness        HEMATOLOGY / ONCOLOGY HISTORY:Ms. Shrestha is a 55 year old female with past medical history of hypertension, chronic kidney disease, diabetes insulindependent  type 2   No history exists.     [No treatment plan]  Cancer Staging Information:  No matching staging information was found for the patient.      INTERVAL HISTORY  Patient ID: Maria C Shrestha is a 57 y.o. year old female   Anemia secondary to chronic kidney disease, Stage III. Patient has not required VALERIE therapy this far.  2. Iron deficiency in the setting of chronic kidney disease, has benefitted from Feraheme. Had Feraheme in January, hemoglobin has  now near normalized at 10.7        Review of Systems   Constitutional: Negative.    HENT: Negative.    Eyes: Negative.    Respiratory: Negative.    Cardiovascular: Negative.    Gastrointestinal: Negative.    Endocrine: Negative.    Genitourinary: Negative.    Musculoskeletal: Negative.    Skin: Negative.    Allergic/Immunologic: Negative.    Neurological: Negative.    Hematological: Negative.    Psychiatric/Behavioral: Negative.             Medications:    Current Outpatient Prescriptions   Medication Sig Dispense Refill   • allopurinol (ZYLOPRIM) 100 MG tablet Take 100 mg by mouth 2 (Two) Times a Day.     • aspirin 81 MG tablet Take 81 mg by mouth Daily.     • B-D ULTRA-FINE 33 LANCETS misc Use 4 x daily 120 each 11   • busPIRone (BUSPAR) 10 MG tablet Take 10 mg by mouth 2 (Two) Times a Day.     • calcitriol (ROCALTROL) 0.25 MCG capsule Take 0.25 mcg by mouth 3 (Three) Times a Week.  "    • Calcium Carb-Cholecalciferol (CALCIUM-VITAMIN D3) 600-400 MG-UNIT tablet Take 1 tablet by mouth 2 (Two) Times a Day.     • carvedilol (COREG) 3.125 MG tablet Take 3.125 mg by mouth 2 (Two) Times a Day.     • cetirizine (zyrTEC) 10 MG tablet Take 10 mg by mouth As Needed.     • cholecalciferol (VITAMIN D3) 55417 UNITS capsule Take 50,000 Units by mouth Every 7 (Seven) Days.     • citalopram (CeleXA) 20 MG tablet Take 40 mg by mouth Daily.     • Dulaglutide (TRULICITY) 1.5 MG/0.5ML solution pen-injector Inject 1.5 mg under the skin Every 7 (Seven) Days. 12 pen 3   • ferrous sulfate 325 (65 FE) MG tablet Take 1 tablet by mouth Daily With Breakfast. (Patient taking differently: Take 325 mg by mouth 2 (Two) Times a Day.) 60 tablet 3   • furosemide (LASIX) 40 MG tablet Take 1 tablet by mouth Daily. 90 tablet 3   • Glucose Blood (BLOOD GLUCOSE TEST) strip Use 4 x daily, use any brand covered by insurance or same brand as before 120 each 11   • insulin glargine (LANTUS) 100 UNIT/ML injection Inject up to 100 u daily (Patient taking differently: Inject 192 Units under the skin Every Night. Inject up to 100 u daily) 9 each 3   • Insulin Syringe 31G X 5/16\" 1 ML misc 4 x daily 120 each 11   • Insulin Syringe-Needle U-100 31G X 5/16\" 0.3 ML misc use as indicated 4 times daily. 120 each 11   • lamoTRIgine (LaMICtal) 50 MG tablet dispersible disintegrating tablet Take 50 mg by mouth Daily.     • levothyroxine (SYNTHROID) 50 MCG tablet Take 1 tablet by mouth Daily. 90 tablet 3   • lisinopril (PRINIVIL,ZESTRIL) 20 MG tablet Take 20 mg by mouth 2 (Two) Times a Day.     • Multiple Vitamins-Minerals (MULTIVITAMIN ADULT PO) Take 1 tablet by mouth Daily.     • potassium gluconate 595 MG tablet tablet Take 595 mg by mouth 2 (Two) Times a Day.     • rosuvastatin (CRESTOR) 10 MG tablet Take 10 mg by mouth Every Other Day.     • sodium bicarbonate 650 MG tablet Take 650 mg by mouth 3 (Three) Times a Day.       No current " facility-administered medications for this visit.        ALLERGIES:    Allergies   Allergen Reactions   • Codeine Nausea Only       Objective      @VITALS    Current Status 7/12/2017   ECOG score 2       General Appearance: Patient is awake, alert, oriented and in no acute distress. Patient is welldeveloped, wellnourished, and appears stated age.  HEENT: Normocephalic. Sclerae clear, conjunctiva pink, extraocular movements intact, pupils, round, reactive to light and  accommodation. Mouth and throat are clear with moist oral mucosa.  NECK: Supple, no jugular venous distention, thyroid not enlarged.  LYMPH: No cervical, supraclavicular, axillary, or inguinal lymphadenopathy.  CHEST: Equal bilateral expansion, AP  diameter normal, resonant percussion note  LUNGS: Good air movement, no rales, rhonchi, rubs or wheezes with auscultation  CARDIO: Regular sinus rhythm, no murmurs, gallops or rubs.  ABDOMEN: Nondistended, soft, No tenderness, no guarding, no rebound, No hepatosplenomegaly. No abdominal masses. Bowel sounds positive. No hernia  GENITALIA: Not examined.  BREASTS: Not examined.  MUSKEL: No joint swelling, decreased motion, or inflammation  EXTREMS: No edema, clubbing, cyanosis, No varicose veins.  NEURO: Grossly nonfocal, Gait is coordinated and smooth, Cognition is preserved.  SKIN: No rashes, no ecchymoses, no petechia.  PSYCH: Oriented to time, place and person. Memory is preserved. Mood and affect appear normal      RECENT LABS:  Orders Only on 07/12/2017   Component Date Value Ref Range Status   • Glucose 07/12/2017 106  75 - 110 mg/dL Final   • BUN 07/12/2017 39* 7 - 26 mg/dL Final   • Creatinine 07/12/2017 1.90* 0.70 - 1.40 mg/dL Final   • Sodium 07/12/2017 144  137 - 145 mmol/L Final   • Potassium 07/12/2017 4.1  3.6 - 5.0 mmol/L Final   • Chloride 07/12/2017 110* 98 - 107 mmol/L Final   • CO2 07/12/2017 23.0  22.0 - 30.0 mmol/L Final   • Calcium 07/12/2017 9.6  8.4 - 10.2 mg/dL Final   • Total Protein  07/12/2017 7.5  6.3 - 8.2 g/dL Final   • Albumin 07/12/2017 3.90  3.50 - 5.00 g/dL Final   • ALT (SGPT) 07/12/2017 20  9 - 52 U/L Final   • AST (SGOT) 07/12/2017 20  5 - 40 U/L Final   • Alkaline Phosphatase 07/12/2017 85  38 - 126 U/L Final   • Total Bilirubin 07/12/2017 0.4  0.2 - 1.3 mg/dL Final   • eGFR Non African Amer 07/12/2017 27* >60 mL/min/1.73 Final   • Globulin 07/12/2017 3.6  gm/dL Final   • A/G Ratio 07/12/2017 1.1  g/dL Final   • BUN/Creatinine Ratio 07/12/2017 20.5  7.0 - 25.0 Final   • Anion Gap 07/12/2017 11.0  mmol/L Final   • WBC 07/12/2017 10.20  4.80 - 10.80 10*3/mm3 Final   • RBC 07/12/2017 3.44* 4.20 - 5.40 10*6/mm3 Final   • Hemoglobin 07/12/2017 10.4* 12.0 - 16.0 g/dL Final   • Hematocrit 07/12/2017 32.8* 37.0 - 47.0 % Final   • MCV 07/12/2017 95.3  81.0 - 99.0 fL Final   • MCH 07/12/2017 30.2  27.0 - 31.0 pg Final   • MCHC 07/12/2017 31.7* 33.0 - 37.0 g/dL Final   • RDW 07/12/2017 18.3* 11.5 - 14.5 % Final   • MPV 07/12/2017 8.5  6.0 - 12.0 fL Final   • Platelets 07/12/2017 229  130 - 400 10*3/mm3 Final   • Neutrophil % 07/12/2017 75.2  37.0 - 92.0 % Final   • Lymphocyte % 07/12/2017 20.1  10.0 - 58.5 % Final   • Auto Mixed Cells % 07/12/2017 4.7  0.2 - 15.1 % Final   • Neutrophils, Absolute 07/12/2017 7.70  2.00 - 7.80 10*3/mm3 Final   • Lymphocytes, Absolute 07/12/2017 2.10  0.80 - 7.00 10*3/mm3 Final   • Auto Mixed Cells # 07/12/2017 0.50  0.10 - 1.50 10*3/mm3 Final       RADIOLOGY:  No results found.         Assessment/Plan        Anemia secondary to chronic kidney disease, Stage III. Patient has not required VALERIE therapy this far.  2. Iron deficiency in the setting of chronic kidney disease, has benefitted from Feraheme. Had Feraheme in January, hemoglobin has  now near normalized at 10.3.  Plan continue watchful waiting return to clinic in 3 months time and check another CBC and iron studies.  If the iron is still low I may give additional intravenous iron.  Her CMS guidelines I  cannot give her Procrit unless hemoglobin is less than 10.    Clinically patient asymptomatic.  I will redraw her iron studies today.  Hemoglobin is a 10.4gm Today.  She is clinically exhausted.  Over the last 6 months she has been taking oral iron and despite that the hemoglobin is not improved.  I will recheck her iron studies today.  If the iron is still low I may consider giving her infusional iron.   Her iron levels are normal.  I want to treat her with Procrit, however her CMS guidelines she is not qualifying for that since to initiate Procrit the hemoglobin has to be less than 10.    Marco A Melo MD    7/12/2017    8:22 AM

## 2017-07-13 LAB
EKG P AXIS: 31 DEGREES
EKG P-R INTERVAL: 210 MS
EKG Q-T INTERVAL: 448 MS
EKG QRS DURATION: 94 MS
EKG QTC CALCULATION (BAZETT): 462 MS
EKG T AXIS: 52 DEGREES

## 2017-07-25 ENCOUNTER — TRANSCRIBE ORDERS (OUTPATIENT)
Dept: ADMINISTRATIVE | Facility: HOSPITAL | Age: 57
End: 2017-07-25

## 2017-07-25 DIAGNOSIS — R42 DIZZINESS AND GIDDINESS: Primary | ICD-10-CM

## 2017-07-25 DIAGNOSIS — R55 SYNCOPE, UNSPECIFIED SYNCOPE TYPE: ICD-10-CM

## 2017-08-03 ENCOUNTER — HOSPITAL ENCOUNTER (OUTPATIENT)
Dept: ULTRASOUND IMAGING | Facility: HOSPITAL | Age: 57
Discharge: HOME | End: 2017-08-03

## 2017-08-03 ENCOUNTER — HOSPITAL ENCOUNTER (OUTPATIENT)
Dept: CARDIOLOGY | Facility: HOSPITAL | Age: 57
Discharge: HOME | End: 2017-08-03
Admitting: NURSE PRACTITIONER

## 2017-08-03 VITALS
HEIGHT: 65 IN | SYSTOLIC BLOOD PRESSURE: 130 MMHG | DIASTOLIC BLOOD PRESSURE: 78 MMHG | WEIGHT: 293 LBS | BODY MASS INDEX: 48.82 KG/M2

## 2017-08-03 DIAGNOSIS — R42 DIZZINESS AND GIDDINESS: ICD-10-CM

## 2017-08-03 DIAGNOSIS — R55 SYNCOPE, UNSPECIFIED SYNCOPE TYPE: ICD-10-CM

## 2017-08-03 PROCEDURE — 93306 TTE W/DOPPLER COMPLETE: CPT | Performed by: INTERNAL MEDICINE

## 2017-08-03 PROCEDURE — 25010000002 PERFLUTREN (DEFINITY) 8.476 MG IN SODIUM CHLORIDE 10 ML INJECTION: Performed by: INTERNAL MEDICINE

## 2017-08-03 PROCEDURE — 93880 EXTRACRANIAL BILAT STUDY: CPT

## 2017-08-03 PROCEDURE — C8929 TTE W OR WO FOL WCON,DOPPLER: HCPCS

## 2017-08-03 RX ADMIN — SODIUM CHLORIDE 3 ML: 9 INJECTION INTRAMUSCULAR; INTRAVENOUS; SUBCUTANEOUS at 14:45

## 2017-08-04 LAB
BH CV ECHO MEAS - AO MAX PG (FULL): 10 MMHG
BH CV ECHO MEAS - AO MAX PG: 18 MMHG
BH CV ECHO MEAS - AO MEAN PG (FULL): 6 MMHG
BH CV ECHO MEAS - AO MEAN PG: 11 MMHG
BH CV ECHO MEAS - AO ROOT AREA (BSA CORRECTED): 1.3
BH CV ECHO MEAS - AO ROOT AREA: 10.2 CM^2
BH CV ECHO MEAS - AO ROOT DIAM: 3.6 CM
BH CV ECHO MEAS - AO V2 MAX: 212 CM/SEC
BH CV ECHO MEAS - AO V2 MEAN: 158 CM/SEC
BH CV ECHO MEAS - AO V2 VTI: 49.3 CM
BH CV ECHO MEAS - AVA(I,A): 2.5 CM^2
BH CV ECHO MEAS - AVA(I,D): 2.5 CM^2
BH CV ECHO MEAS - AVA(V,A): 2.5 CM^2
BH CV ECHO MEAS - AVA(V,D): 2.5 CM^2
BH CV ECHO MEAS - BSA(HAYCOCK): 3.1 M^2
BH CV ECHO MEAS - BSA: 2.7 M^2
BH CV ECHO MEAS - BZI_BMI: 67.5 KILOGRAMS/M^2
BH CV ECHO MEAS - BZI_METRIC_HEIGHT: 167.6 CM
BH CV ECHO MEAS - BZI_METRIC_WEIGHT: 189.6 KG
BH CV ECHO MEAS - CONTRAST EF 4CH: 67.7 ML/M^2
BH CV ECHO MEAS - EDV(CUBED): 216 ML
BH CV ECHO MEAS - EDV(MOD-SP4): 174 ML
BH CV ECHO MEAS - EDV(TEICH): 180 ML
BH CV ECHO MEAS - EF(CUBED): 76.5 %
BH CV ECHO MEAS - EF(TEICH): 67.7 %
BH CV ECHO MEAS - ESV(CUBED): 50.7 ML
BH CV ECHO MEAS - ESV(MOD-SP4): 56.2 ML
BH CV ECHO MEAS - ESV(TEICH): 58.1 ML
BH CV ECHO MEAS - FS: 38.3 %
BH CV ECHO MEAS - IVS/LVPW: 0.92
BH CV ECHO MEAS - IVSD: 1.2 CM
BH CV ECHO MEAS - LA DIMENSION: 4.3 CM
BH CV ECHO MEAS - LA/AO: 1.2
BH CV ECHO MEAS - LAT PEAK E' VEL: 6.7 CM/SEC
BH CV ECHO MEAS - LV DIASTOLIC VOL/BSA (35-75): 63.6 ML/M^2
BH CV ECHO MEAS - LV MASS(C)D: 331.8 GRAMS
BH CV ECHO MEAS - LV MASS(C)DI: 121.3 GRAMS/M^2
BH CV ECHO MEAS - LV MAX PG: 8 MMHG
BH CV ECHO MEAS - LV MEAN PG: 5 MMHG
BH CV ECHO MEAS - LV SYSTOLIC VOL/BSA (12-30): 20.5 ML/M^2
BH CV ECHO MEAS - LV V1 MAX: 141 CM/SEC
BH CV ECHO MEAS - LV V1 MEAN: 101 CM/SEC
BH CV ECHO MEAS - LV V1 VTI: 32.1 CM
BH CV ECHO MEAS - LVIDD: 6 CM
BH CV ECHO MEAS - LVIDS: 3.7 CM
BH CV ECHO MEAS - LVLD AP4: 7.4 CM
BH CV ECHO MEAS - LVLS AP4: 5.5 CM
BH CV ECHO MEAS - LVOT AREA (M): 3.8 CM^2
BH CV ECHO MEAS - LVOT AREA: 3.8 CM^2
BH CV ECHO MEAS - LVOT DIAM: 2.2 CM
BH CV ECHO MEAS - LVPWD: 1.3 CM
BH CV ECHO MEAS - MED PEAK E' VEL: 6.09 CM/SEC
BH CV ECHO MEAS - MV A MAX VEL: 111 CM/SEC
BH CV ECHO MEAS - MV DEC TIME: 0.24 SEC
BH CV ECHO MEAS - MV E MAX VEL: 120 CM/SEC
BH CV ECHO MEAS - MV E/A: 1.1
BH CV ECHO MEAS - PA ACC SLOPE: 1321 CM/SEC^2
BH CV ECHO MEAS - PA ACC TIME: 0.08 SEC
BH CV ECHO MEAS - PA PR(ACCEL): 44.4 MMHG
BH CV ECHO MEAS - RAP SYSTOLE: 10 MMHG
BH CV ECHO MEAS - SI(AO): 183.4 ML/M^2
BH CV ECHO MEAS - SI(CUBED): 60.4 ML/M^2
BH CV ECHO MEAS - SI(LVOT): 44.6 ML/M^2
BH CV ECHO MEAS - SI(MOD-SP4): 43.1 ML/M^2
BH CV ECHO MEAS - SI(TEICH): 44.5 ML/M^2
BH CV ECHO MEAS - SV(AO): 501.8 ML
BH CV ECHO MEAS - SV(CUBED): 165.3 ML
BH CV ECHO MEAS - SV(LVOT): 122 ML
BH CV ECHO MEAS - SV(MOD-SP4): 117.8 ML
BH CV ECHO MEAS - SV(TEICH): 121.9 ML
LEFT ATRIUM VOLUME INDEX: 28.9 ML/M2
LEFT ATRIUM VOLUME: 79.1 CM3

## 2017-08-16 ENCOUNTER — OFFICE VISIT (OUTPATIENT)
Dept: ONCOLOGY | Facility: CLINIC | Age: 57
End: 2017-08-16

## 2017-08-16 ENCOUNTER — LAB (OUTPATIENT)
Dept: ONCOLOGY | Facility: CLINIC | Age: 57
End: 2017-08-16

## 2017-08-16 VITALS
SYSTOLIC BLOOD PRESSURE: 134 MMHG | WEIGHT: 293 LBS | OXYGEN SATURATION: 97 % | BODY MASS INDEX: 48.82 KG/M2 | RESPIRATION RATE: 71 BRPM | HEART RATE: 85 BPM | DIASTOLIC BLOOD PRESSURE: 78 MMHG | TEMPERATURE: 97.8 F | HEIGHT: 65 IN

## 2017-08-16 DIAGNOSIS — N18.9 ANEMIA IN CKD (CHRONIC KIDNEY DISEASE): Primary | ICD-10-CM

## 2017-08-16 DIAGNOSIS — E11.21 DIABETIC GLOMERULOPATHY (HCC): Primary | ICD-10-CM

## 2017-08-16 DIAGNOSIS — D63.1 ANEMIA IN CKD (CHRONIC KIDNEY DISEASE): Primary | ICD-10-CM

## 2017-08-16 LAB
ALBUMIN SERPL-MCNC: 4 G/DL (ref 3.5–5)
ALBUMIN/GLOB SERPL: 1.1 G/DL
ALP SERPL-CCNC: 74 U/L (ref 38–126)
ALT SERPL W P-5'-P-CCNC: 20 U/L (ref 9–52)
ANION GAP SERPL CALCULATED.3IONS-SCNC: 14 MMOL/L
AST SERPL-CCNC: 17 U/L (ref 5–40)
AUTO MIXED CELLS #: 0.6 10*3/MM3 (ref 0.1–1.5)
AUTO MIXED CELLS %: 5.8 % (ref 0.2–15.1)
BILIRUB SERPL-MCNC: 0.3 MG/DL (ref 0.2–1.3)
BUN BLD-MCNC: 39 MG/DL (ref 7–26)
BUN/CREAT SERPL: 17 (ref 7–25)
CALCIUM SPEC-SCNC: 10.5 MG/DL (ref 8.4–10.2)
CHLORIDE SERPL-SCNC: 104 MMOL/L (ref 98–107)
CO2 SERPL-SCNC: 25 MMOL/L (ref 22–30)
CREAT BLD-MCNC: 2.3 MG/DL (ref 0.7–1.4)
ERYTHROCYTE [DISTWIDTH] IN BLOOD BY AUTOMATED COUNT: 18.1 % (ref 11.5–14.5)
GFR SERPL CREATININE-BSD FRML MDRD: 22 ML/MIN/1.73
GLOBULIN UR ELPH-MCNC: 3.7 GM/DL
GLUCOSE BLD-MCNC: 121 MG/DL (ref 75–110)
HCT VFR BLD AUTO: 32.5 % (ref 37–47)
HGB BLD-MCNC: 10.4 G/DL (ref 12–16)
LYMPHOCYTES # BLD AUTO: 1.8 10*3/MM3 (ref 0.8–7)
LYMPHOCYTES NFR BLD AUTO: 18.5 % (ref 10–58.5)
MCH RBC QN AUTO: 30.4 PG (ref 27–31)
MCHC RBC AUTO-ENTMCNC: 32 G/DL (ref 33–37)
MCV RBC AUTO: 94.9 FL (ref 81–99)
NEUTROPHILS # BLD AUTO: 7.4 10*3/MM3 (ref 2–7.8)
NEUTROPHILS NFR BLD AUTO: 75.7 % (ref 37–92)
PLATELET # BLD AUTO: 221 10*3/MM3 (ref 130–400)
PMV BLD AUTO: 8.6 FL (ref 6–12)
POTASSIUM BLD-SCNC: 4.2 MMOL/L (ref 3.6–5)
PROT SERPL-MCNC: 7.7 G/DL (ref 6.3–8.2)
RBC # BLD AUTO: 3.42 10*6/MM3 (ref 4.2–5.4)
SODIUM BLD-SCNC: 143 MMOL/L (ref 137–145)
WBC NRBC COR # BLD: 9.8 10*3/MM3 (ref 4.8–10.8)

## 2017-08-16 PROCEDURE — 36415 COLL VENOUS BLD VENIPUNCTURE: CPT | Performed by: INTERNAL MEDICINE

## 2017-08-16 PROCEDURE — 99214 OFFICE O/P EST MOD 30 MIN: CPT | Performed by: INTERNAL MEDICINE

## 2017-08-16 PROCEDURE — 80053 COMPREHEN METABOLIC PANEL: CPT | Performed by: INTERNAL MEDICINE

## 2017-08-16 PROCEDURE — 85025 COMPLETE CBC W/AUTO DIFF WBC: CPT | Performed by: INTERNAL MEDICINE

## 2017-08-16 NOTE — PROGRESS NOTES
[facility]  HEMATOLOGY & ONCOLOGY        Subjective     VISIT DIAGNOSIS:   No diagnosis found.    REASON FOR VISIT:     Chief Complaint   Patient presents with   • Follow-up     She is here for f/u visit.        HEMATOLOGY / ONCOLOGY HISTORY:Ms. Shrestha is a 55 year old female with past medical history of hypertension, chronic kidney disease, diabetes insulindependent  type 2   No history exists.     [No treatment plan]  Cancer Staging Information:  No matching staging information was found for the patient.      INTERVAL HISTORY  Patient ID: Maria C Shrestha is a 57 y.o. year old female   Anemia secondary to chronic kidney disease, Stage III. Patient has not required VALERIE therapy this far.  2. Iron deficiency in the setting of chronic kidney disease, has benefitted from Feraheme. Had Feraheme in January, hemoglobin has  now near normalized at 10.7        Review of Systems   Constitutional: Negative.    HENT: Negative.    Eyes: Negative.    Respiratory: Negative.    Cardiovascular: Negative.    Gastrointestinal: Negative.    Endocrine: Negative.    Genitourinary: Negative.    Musculoskeletal: Negative.    Skin: Negative.    Allergic/Immunologic: Negative.    Neurological: Negative.    Hematological: Negative.    Psychiatric/Behavioral: Negative.             Medications:    Current Outpatient Prescriptions   Medication Sig Dispense Refill   • allopurinol (ZYLOPRIM) 100 MG tablet Take 100 mg by mouth 2 (Two) Times a Day.     • aspirin 81 MG tablet Take 81 mg by mouth Daily.     • B-D ULTRA-FINE 33 LANCETS misc Use 4 x daily 120 each 11   • busPIRone (BUSPAR) 10 MG tablet Take 10 mg by mouth 2 (Two) Times a Day.     • calcitriol (ROCALTROL) 0.25 MCG capsule Take 0.25 mcg by mouth 3 (Three) Times a Week.     • Calcium Carb-Cholecalciferol (CALCIUM-VITAMIN D3) 600-400 MG-UNIT tablet Take 1 tablet by mouth 2 (Two) Times a Day.     • carvedilol (COREG) 3.125 MG tablet Take 3.125 mg by mouth 2 (Two) Times a Day.   "   • cetirizine (zyrTEC) 10 MG tablet Take 10 mg by mouth As Needed.     • cholecalciferol (VITAMIN D3) 90387 UNITS capsule Take 50,000 Units by mouth Every 7 (Seven) Days.     • citalopram (CeleXA) 20 MG tablet Take 40 mg by mouth Daily.     • Dulaglutide (TRULICITY) 1.5 MG/0.5ML solution pen-injector Inject 1.5 mg under the skin Every 7 (Seven) Days. 12 pen 3   • ferrous sulfate 325 (65 FE) MG tablet Take 1 tablet by mouth Daily With Breakfast. (Patient taking differently: Take 325 mg by mouth 2 (Two) Times a Day.) 60 tablet 3   • furosemide (LASIX) 40 MG tablet Take 1 tablet by mouth Daily. 90 tablet 3   • Glucose Blood (BLOOD GLUCOSE TEST) strip Use 4 x daily, use any brand covered by insurance or same brand as before 120 each 11   • insulin glargine (LANTUS) 100 UNIT/ML injection Inject up to 100 u daily (Patient taking differently: Inject 192 Units under the skin Every Night. Inject up to 100 u daily) 9 each 3   • Insulin Syringe 31G X 5/16\" 1 ML misc 4 x daily 120 each 11   • Insulin Syringe-Needle U-100 31G X 5/16\" 0.3 ML misc use as indicated 4 times daily. 120 each 11   • lamoTRIgine (LaMICtal) 50 MG tablet dispersible disintegrating tablet Take 50 mg by mouth Daily.     • levothyroxine (SYNTHROID) 50 MCG tablet Take 1 tablet by mouth Daily. 90 tablet 3   • lisinopril (PRINIVIL,ZESTRIL) 20 MG tablet Take 20 mg by mouth 2 (Two) Times a Day.     • Multiple Vitamins-Minerals (MULTIVITAMIN ADULT PO) Take 1 tablet by mouth Daily.     • potassium gluconate 595 MG tablet tablet Take 595 mg by mouth 2 (Two) Times a Day.     • rosuvastatin (CRESTOR) 10 MG tablet Take 10 mg by mouth Every Other Day.     • sodium bicarbonate 650 MG tablet Take 650 mg by mouth 3 (Three) Times a Day.       No current facility-administered medications for this visit.        ALLERGIES:    Allergies   Allergen Reactions   • Codeine Nausea Only       Objective      @VITALS    Current Status 8/16/2017   ECOG score 3       General " Appearance: Patient is awake, alert, oriented and in no acute distress. Patient is welldeveloped, wellnourished, and appears stated age.  HEENT: Normocephalic. Sclerae clear, conjunctiva pink, extraocular movements intact, pupils, round, reactive to light and  accommodation. Mouth and throat are clear with moist oral mucosa.  NECK: Supple, no jugular venous distention, thyroid not enlarged.  LYMPH: No cervical, supraclavicular, axillary, or inguinal lymphadenopathy.  CHEST: Equal bilateral expansion, AP  diameter normal, resonant percussion note  LUNGS: Good air movement, no rales, rhonchi, rubs or wheezes with auscultation  CARDIO: Regular sinus rhythm, no murmurs, gallops or rubs.  ABDOMEN: Nondistended, soft, No tenderness, no guarding, no rebound, No hepatosplenomegaly. No abdominal masses. Bowel sounds positive. No hernia  GENITALIA: Not examined.  BREASTS: Not examined.  MUSKEL: No joint swelling, decreased motion, or inflammation  EXTREMS: No edema, clubbing, cyanosis, No varicose veins.  NEURO: Grossly nonfocal, Gait is coordinated and smooth, Cognition is preserved.  SKIN: No rashes, no ecchymoses, no petechia.  PSYCH: Oriented to time, place and person. Memory is preserved. Mood and affect appear normal      RECENT LABS:  Orders Only on 08/16/2017   Component Date Value Ref Range Status   • WBC 08/16/2017 9.80  4.80 - 10.80 10*3/mm3 Final   • RBC 08/16/2017 3.42* 4.20 - 5.40 10*6/mm3 Final   • Hemoglobin 08/16/2017 10.4* 12.0 - 16.0 g/dL Final   • Hematocrit 08/16/2017 32.5* 37.0 - 47.0 % Final   • MCV 08/16/2017 94.9  81.0 - 99.0 fL Final   • MCH 08/16/2017 30.4  27.0 - 31.0 pg Final   • MCHC 08/16/2017 32.0* 33.0 - 37.0 g/dL Final   • RDW 08/16/2017 18.1* 11.5 - 14.5 % Final   • MPV 08/16/2017 8.6  6.0 - 12.0 fL Final   • Platelets 08/16/2017 221  130 - 400 10*3/mm3 Final   • Neutrophil % 08/16/2017 75.7  37.0 - 92.0 % Final   • Lymphocyte % 08/16/2017 18.5  10.0 - 58.5 % Final   • Auto Mixed Cells %  08/16/2017 5.8  0.2 - 15.1 % Final   • Neutrophils, Absolute 08/16/2017 7.40  2.00 - 7.80 10*3/mm3 Final   • Lymphocytes, Absolute 08/16/2017 1.80  0.80 - 7.00 10*3/mm3 Final   • Auto Mixed Cells # 08/16/2017 0.60  0.10 - 1.50 10*3/mm3 Final       RADIOLOGY:  Us Carotid Bilateral    Result Date: 8/3/2017  Narrative: EXAMINATION:   US CAROTID BILATERAL-  8/3/2017 3:45 PM CDT  HISTORY: US CAROTID BILATERAL- 8/3/2017 3:36 PM CDT  HISTORY: DIZZINESS AND GIDDINESS; R42-Dizziness and giddiness   COMPARISON: None  FINDINGS: Transverse and longitudinal grayscale and color Doppler sonographic images of the carotid arteries were obtained. Spectral analysis was also performed.  RIGHT: Grayscale appearance: Normal appearance without atherosclerosis or aneurysm.  Common carotid artery: Peak systolic velocity of 1:30 cm/sec. Normal spectral waveform.  Internal carotid artery: Peak systolic velocity of 126 cm/sec. Normal spectral waveform.  ICA/CCA ratio: 1.09  External carotid artery: Normal spectral waveform and velocities.  Vertebral artery: Normal directional flow and spectral waveform.  LEFT: Grayscale appearance: Normal appearance without atherosclerosis or aneurysm.  Common carotid artery: Peak systolic velocity of 132 cm/sec. Normal spectral waveform.  Internal carotid artery: Peak systolic velocity of 108 cm/sec. Normal spectral waveform.  ICA/CCA ratio: 0.84  External carotid artery: Normal spectral waveform and velocities.  Vertebral artery: Normal directional flow and spectral waveform.      Impression:  1. Spectral analysis suggest 50% or less stenosis associated with the right internal carotid artery. 2. Spectral analysis suggests 50% or less stenosis associated left internal carotid artery. 3. Antegrade flow is present in both vertebral arteries.   Consensus Panel Gray-Scale and Doppler US Criteria for Diagnosis of ICA Stenosis:                                                                                                            Primary Parameters                                         Additional Parameters  Degree of                                ICA PSV                Plaque Estimate                 ICA/CCA PSV              ICA EDV Stenosis (%)                          (cm/sec)                          (%)*                                     Ratio                     (cm/sec) ________________________________________________________________________ _________________  Normal                                         <125                             None                                      <2.0                           <40  <50                                               <125                              <50                                       <2.0                           <40  50-69                                        125-230                          >/=50                                    2.0-4.0                        >/=70 but less than near             >230                            >/=50                                      >4.0                          >100     occlusion  Near occlusion                        High, low, or                    Visible                                  Variable                    Variable                                                   undetectable  Total occlusion                        Undetectable           Visible, no detectable                     N/A                             N/A                                                                                              lumen        This report was finalized on 08/03/2017 15:48 by Dr. Xavier Krishna MD.           Assessment/Plan        Anemia secondary to chronic kidney disease, Stage III. Patient has not required VALERIE therapy this far.  2. Iron deficiency in the setting of chronic kidney disease, has benefitted from Feraheme. Had Feraheme in January, hemoglobin has  now near normalized at 10.3.  Plan continue  watchful waiting return to clinic in 3 months time and check another CBC and iron studies.  If the iron is still low I may give additional intravenous iron.  Her CMS guidelines I cannot give her Procrit unless hemoglobin is less than 10.    Clinically patient asymptomatic.  I will redraw her iron studies today.  Hemoglobin is a 10.  Return to clinic in 3 months time.            Marco A Melo MD    8/16/2017    11:06 AM             [facility]  HEMATOLOGY & ONCOLOGY        Subjective     VISIT DIAGNOSIS:   No diagnosis found.    REASON FOR VISIT:     Chief Complaint   Patient presents with   • Follow-up     She is here for f/u visit.        HEMATOLOGY / ONCOLOGY HISTORY:Ms. Shrestha is a 55 year old female with past medical history of hypertension, chronic kidney disease, diabetes insulindependent  type 2   No history exists.     [No treatment plan]  Cancer Staging Information:  No matching staging information was found for the patient.      INTERVAL HISTORY  Patient ID: Maria C Shrestha is a 57 y.o. year old female   Anemia secondary to chronic kidney disease, Stage III. Patient has not required VALERIE therapy this far.  2. Iron deficiency in the setting of chronic kidney disease, has benefitted from Feraheme. Had Feraheme in January, hemoglobin has  now near normalized at 10.7        Review of Systems   Constitutional: Negative.    HENT: Negative.    Eyes: Negative.    Respiratory: Negative.    Cardiovascular: Negative.    Gastrointestinal: Negative.    Endocrine: Negative.    Genitourinary: Negative.    Musculoskeletal: Negative.    Skin: Negative.    Allergic/Immunologic: Negative.    Neurological: Negative.    Hematological: Negative.    Psychiatric/Behavioral: Negative.             Medications:    Current Outpatient Prescriptions   Medication Sig Dispense Refill   • allopurinol (ZYLOPRIM) 100 MG tablet Take 100 mg by mouth 2 (Two) Times a Day.     • aspirin 81 MG tablet Take 81 mg by mouth Daily.     •  "B-D ULTRA-FINE 33 LANCETS misc Use 4 x daily 120 each 11   • busPIRone (BUSPAR) 10 MG tablet Take 10 mg by mouth 2 (Two) Times a Day.     • calcitriol (ROCALTROL) 0.25 MCG capsule Take 0.25 mcg by mouth 3 (Three) Times a Week.     • Calcium Carb-Cholecalciferol (CALCIUM-VITAMIN D3) 600-400 MG-UNIT tablet Take 1 tablet by mouth 2 (Two) Times a Day.     • carvedilol (COREG) 3.125 MG tablet Take 3.125 mg by mouth 2 (Two) Times a Day.     • cetirizine (zyrTEC) 10 MG tablet Take 10 mg by mouth As Needed.     • cholecalciferol (VITAMIN D3) 52012 UNITS capsule Take 50,000 Units by mouth Every 7 (Seven) Days.     • citalopram (CeleXA) 20 MG tablet Take 40 mg by mouth Daily.     • Dulaglutide (TRULICITY) 1.5 MG/0.5ML solution pen-injector Inject 1.5 mg under the skin Every 7 (Seven) Days. 12 pen 3   • ferrous sulfate 325 (65 FE) MG tablet Take 1 tablet by mouth Daily With Breakfast. (Patient taking differently: Take 325 mg by mouth 2 (Two) Times a Day.) 60 tablet 3   • furosemide (LASIX) 40 MG tablet Take 1 tablet by mouth Daily. 90 tablet 3   • Glucose Blood (BLOOD GLUCOSE TEST) strip Use 4 x daily, use any brand covered by insurance or same brand as before 120 each 11   • insulin glargine (LANTUS) 100 UNIT/ML injection Inject up to 100 u daily (Patient taking differently: Inject 192 Units under the skin Every Night. Inject up to 100 u daily) 9 each 3   • Insulin Syringe 31G X 5/16\" 1 ML misc 4 x daily 120 each 11   • Insulin Syringe-Needle U-100 31G X 5/16\" 0.3 ML misc use as indicated 4 times daily. 120 each 11   • lamoTRIgine (LaMICtal) 50 MG tablet dispersible disintegrating tablet Take 50 mg by mouth Daily.     • levothyroxine (SYNTHROID) 50 MCG tablet Take 1 tablet by mouth Daily. 90 tablet 3   • lisinopril (PRINIVIL,ZESTRIL) 20 MG tablet Take 20 mg by mouth 2 (Two) Times a Day.     • Multiple Vitamins-Minerals (MULTIVITAMIN ADULT PO) Take 1 tablet by mouth Daily.     • potassium gluconate 595 MG tablet tablet Take " 595 mg by mouth 2 (Two) Times a Day.     • rosuvastatin (CRESTOR) 10 MG tablet Take 10 mg by mouth Every Other Day.     • sodium bicarbonate 650 MG tablet Take 650 mg by mouth 3 (Three) Times a Day.       No current facility-administered medications for this visit.        ALLERGIES:    Allergies   Allergen Reactions   • Codeine Nausea Only       Objective      @VITALS    Current Status 8/16/2017   ECOG score 3       General Appearance: Patient is awake, alert, oriented and in no acute distress. Patient is welldeveloped, wellnourished, and appears stated age.  HEENT: Normocephalic. Sclerae clear, conjunctiva pink, extraocular movements intact, pupils, round, reactive to light and  accommodation. Mouth and throat are clear with moist oral mucosa.  NECK: Supple, no jugular venous distention, thyroid not enlarged.  LYMPH: No cervical, supraclavicular, axillary, or inguinal lymphadenopathy.  CHEST: Equal bilateral expansion, AP  diameter normal, resonant percussion note  LUNGS: Good air movement, no rales, rhonchi, rubs or wheezes with auscultation  CARDIO: Regular sinus rhythm, no murmurs, gallops or rubs.  ABDOMEN: Nondistended, soft, No tenderness, no guarding, no rebound, No hepatosplenomegaly. No abdominal masses. Bowel sounds positive. No hernia  GENITALIA: Not examined.  BREASTS: Not examined.  MUSKEL: No joint swelling, decreased motion, or inflammation  EXTREMS: No edema, clubbing, cyanosis, No varicose veins.  NEURO: Grossly nonfocal, Gait is coordinated and smooth, Cognition is preserved.  SKIN: No rashes, no ecchymoses, no petechia.  PSYCH: Oriented to time, place and person. Memory is preserved. Mood and affect appear normal      RECENT LABS:  Orders Only on 08/16/2017   Component Date Value Ref Range Status   • WBC 08/16/2017 9.80  4.80 - 10.80 10*3/mm3 Final   • RBC 08/16/2017 3.42* 4.20 - 5.40 10*6/mm3 Final   • Hemoglobin 08/16/2017 10.4* 12.0 - 16.0 g/dL Final   • Hematocrit 08/16/2017 32.5* 37.0 - 47.0  % Final   • MCV 08/16/2017 94.9  81.0 - 99.0 fL Final   • MCH 08/16/2017 30.4  27.0 - 31.0 pg Final   • MCHC 08/16/2017 32.0* 33.0 - 37.0 g/dL Final   • RDW 08/16/2017 18.1* 11.5 - 14.5 % Final   • MPV 08/16/2017 8.6  6.0 - 12.0 fL Final   • Platelets 08/16/2017 221  130 - 400 10*3/mm3 Final   • Neutrophil % 08/16/2017 75.7  37.0 - 92.0 % Final   • Lymphocyte % 08/16/2017 18.5  10.0 - 58.5 % Final   • Auto Mixed Cells % 08/16/2017 5.8  0.2 - 15.1 % Final   • Neutrophils, Absolute 08/16/2017 7.40  2.00 - 7.80 10*3/mm3 Final   • Lymphocytes, Absolute 08/16/2017 1.80  0.80 - 7.00 10*3/mm3 Final   • Auto Mixed Cells # 08/16/2017 0.60  0.10 - 1.50 10*3/mm3 Final       RADIOLOGY:  Us Carotid Bilateral    Result Date: 8/3/2017  Narrative: EXAMINATION:   US CAROTID BILATERAL-  8/3/2017 3:45 PM CDT  HISTORY: US CAROTID BILATERAL- 8/3/2017 3:36 PM CDT  HISTORY: DIZZINESS AND GIDDINESS; R42-Dizziness and giddiness   COMPARISON: None  FINDINGS: Transverse and longitudinal grayscale and color Doppler sonographic images of the carotid arteries were obtained. Spectral analysis was also performed.  RIGHT: Grayscale appearance: Normal appearance without atherosclerosis or aneurysm.  Common carotid artery: Peak systolic velocity of 1:30 cm/sec. Normal spectral waveform.  Internal carotid artery: Peak systolic velocity of 126 cm/sec. Normal spectral waveform.  ICA/CCA ratio: 1.09  External carotid artery: Normal spectral waveform and velocities.  Vertebral artery: Normal directional flow and spectral waveform.  LEFT: Grayscale appearance: Normal appearance without atherosclerosis or aneurysm.  Common carotid artery: Peak systolic velocity of 132 cm/sec. Normal spectral waveform.  Internal carotid artery: Peak systolic velocity of 108 cm/sec. Normal spectral waveform.  ICA/CCA ratio: 0.84  External carotid artery: Normal spectral waveform and velocities.  Vertebral artery: Normal directional flow and spectral waveform.      Impression:   1. Spectral analysis suggest 50% or less stenosis associated with the right internal carotid artery. 2. Spectral analysis suggests 50% or less stenosis associated left internal carotid artery. 3. Antegrade flow is present in both vertebral arteries.   Consensus Panel Gray-Scale and Doppler US Criteria for Diagnosis of ICA Stenosis:                                                                                                           Primary Parameters                                         Additional Parameters  Degree of                                ICA PSV                Plaque Estimate                 ICA/CCA PSV              ICA EDV Stenosis (%)                          (cm/sec)                          (%)*                                     Ratio                     (cm/sec) ________________________________________________________________________ _________________  Normal                                         <125                             None                                      <2.0                           <40  <50                                               <125                              <50                                       <2.0                           <40  50-69                                        125-230                          >/=50                                    2.0-4.0                        >/=70 but less than near             >230                            >/=50                                      >4.0                          >100     occlusion  Near occlusion                        High, low, or                    Visible                                  Variable                    Variable                                                   undetectable  Total occlusion                        Undetectable           Visible, no detectable                     N/A                             N/A                                                                                               lumen        This report was finalized on 08/03/2017 15:48 by Dr. Xavier Krishna MD.           Assessment/Plan        Anemia secondary to chronic kidney disease, Stage III. Patient has not required VALERIE therapy this far.  2. Iron deficiency in the setting of chronic kidney disease, has benefitted from Feraheme. Had Feraheme in January, hemoglobin has  now near normalized at 10.3.  Plan continue watchful waiting return to clinic in 3 months time and check another CBC and iron studies.  If the iron is still low I may give additional intravenous iron.  Her CMS guidelines I cannot give her Procrit unless hemoglobin is less than 10.    Clinically patient asymptomatic.  I will redraw her iron studies today.  Hemoglobin is a 10.4gm Today.  She is clinically exhausted.  Over the last 6 months she has been taking oral iron and despite that the hemoglobin is not improved.  I will recheck her iron studies today.  If the iron is still low I may consider giving her infusional iron.   Her iron levels are normal.  I want to treat her with Procrit, however her CMS guidelines she is not qualifying for that since to initiate Procrit the hemoglobin has to be less than 10.    Marco A Melo MD    8/16/2017    11:06 AM

## 2017-09-06 DIAGNOSIS — E56.0 VITAMIN E DEFICIENCY: Primary | ICD-10-CM

## 2017-09-08 DIAGNOSIS — E11.69 DIABETES MELLITUS ASSOCIATED WITH HORMONAL ETIOLOGY (HCC): Primary | ICD-10-CM

## 2017-09-13 ENCOUNTER — APPOINTMENT (OUTPATIENT)
Dept: LAB | Facility: HOSPITAL | Age: 57
End: 2017-09-13

## 2017-09-14 ENCOUNTER — TELEPHONE (OUTPATIENT)
Dept: ENDOCRINOLOGY | Facility: CLINIC | Age: 57
End: 2017-09-14

## 2017-09-21 ENCOUNTER — INFUSION (OUTPATIENT)
Dept: ONCOLOGY | Facility: HOSPITAL | Age: 57
End: 2017-09-21

## 2017-09-21 ENCOUNTER — OFFICE VISIT (OUTPATIENT)
Dept: ONCOLOGY | Facility: CLINIC | Age: 57
End: 2017-09-21

## 2017-09-21 ENCOUNTER — LAB (OUTPATIENT)
Dept: LAB | Facility: HOSPITAL | Age: 57
End: 2017-09-21

## 2017-09-21 VITALS
SYSTOLIC BLOOD PRESSURE: 118 MMHG | OXYGEN SATURATION: 95 % | HEART RATE: 88 BPM | HEIGHT: 65 IN | BODY MASS INDEX: 48.82 KG/M2 | TEMPERATURE: 98.1 F | RESPIRATION RATE: 16 BRPM | DIASTOLIC BLOOD PRESSURE: 72 MMHG | WEIGHT: 293 LBS

## 2017-09-21 VITALS
TEMPERATURE: 96.5 F | BODY MASS INDEX: 48.82 KG/M2 | WEIGHT: 293 LBS | DIASTOLIC BLOOD PRESSURE: 58 MMHG | RESPIRATION RATE: 18 BRPM | OXYGEN SATURATION: 98 % | HEART RATE: 70 BPM | HEIGHT: 65 IN | SYSTOLIC BLOOD PRESSURE: 148 MMHG

## 2017-09-21 DIAGNOSIS — D63.1 ANEMIA IN CKD (CHRONIC KIDNEY DISEASE): ICD-10-CM

## 2017-09-21 DIAGNOSIS — Z79.4 TYPE 2 DIABETES MELLITUS WITH STAGE 3 CHRONIC KIDNEY DISEASE, WITH LONG-TERM CURRENT USE OF INSULIN (HCC): Primary | ICD-10-CM

## 2017-09-21 DIAGNOSIS — N18.9 ANEMIA IN CKD (CHRONIC KIDNEY DISEASE): Primary | ICD-10-CM

## 2017-09-21 DIAGNOSIS — E11.22 TYPE 2 DIABETES MELLITUS WITH STAGE 3 CHRONIC KIDNEY DISEASE, WITH LONG-TERM CURRENT USE OF INSULIN (HCC): Primary | ICD-10-CM

## 2017-09-21 DIAGNOSIS — N18.9 ANEMIA IN CKD (CHRONIC KIDNEY DISEASE): ICD-10-CM

## 2017-09-21 DIAGNOSIS — E11.69 DIABETES MELLITUS ASSOCIATED WITH HORMONAL ETIOLOGY (HCC): ICD-10-CM

## 2017-09-21 DIAGNOSIS — N18.30 TYPE 2 DIABETES MELLITUS WITH STAGE 3 CHRONIC KIDNEY DISEASE, WITH LONG-TERM CURRENT USE OF INSULIN (HCC): Primary | ICD-10-CM

## 2017-09-21 DIAGNOSIS — D63.1 ANEMIA IN CKD (CHRONIC KIDNEY DISEASE): Primary | ICD-10-CM

## 2017-09-21 LAB
ALBUMIN SERPL-MCNC: 3.7 G/DL (ref 3.5–5)
ALBUMIN/GLOB SERPL: 1.1 G/DL (ref 1.1–2.5)
ALP SERPL-CCNC: 67 U/L (ref 24–120)
ALT SERPL W P-5'-P-CCNC: 31 U/L (ref 0–54)
ANION GAP SERPL CALCULATED.3IONS-SCNC: 8 MMOL/L (ref 4–13)
AST SERPL-CCNC: 24 U/L (ref 7–45)
AUTO MIXED CELLS #: 0.4 10*3/MM3 (ref 0.1–2.6)
AUTO MIXED CELLS %: 3.4 % (ref 0.1–24)
BILIRUB SERPL-MCNC: 0.2 MG/DL (ref 0.1–1)
BUN BLD-MCNC: 41 MG/DL (ref 5–21)
BUN/CREAT SERPL: 19.7
CALCIUM SPEC-SCNC: 9.1 MG/DL (ref 8.4–10.4)
CHLORIDE SERPL-SCNC: 107 MMOL/L (ref 98–110)
CO2 SERPL-SCNC: 25 MMOL/L (ref 24–31)
CREAT BLD-MCNC: 2.08 MG/DL (ref 0.5–1.4)
ERYTHROCYTE [DISTWIDTH] IN BLOOD BY AUTOMATED COUNT: 16.2 % (ref 12–15)
GFR SERPL CREATININE-BSD FRML MDRD: 25 ML/MIN/1.73
GLOBULIN UR ELPH-MCNC: 3.3 GM/DL
GLUCOSE BLD-MCNC: 111 MG/DL (ref 70–100)
HCT VFR BLD AUTO: 28.8 % (ref 37–47)
HGB BLD-MCNC: 9.7 G/DL (ref 12–16)
HOLD SPECIMEN: NORMAL
LYMPHOCYTES # BLD AUTO: 1.8 10*3/MM3 (ref 0.8–7)
LYMPHOCYTES NFR BLD AUTO: 15.6 % (ref 15–45)
MCH RBC QN AUTO: 30.9 PG (ref 28–32)
MCHC RBC AUTO-ENTMCNC: 33.7 G/DL (ref 33–36)
MCV RBC AUTO: 91.7 FL (ref 82–98)
NEUTROPHILS # BLD AUTO: 9.3 10*3/MM3 (ref 1.5–8.3)
NEUTROPHILS NFR BLD AUTO: 81 % (ref 39–78)
PLATELET # BLD AUTO: 193 10*3/MM3 (ref 130–400)
PMV BLD AUTO: 9 FL (ref 6–12)
POTASSIUM BLD-SCNC: 4.3 MMOL/L (ref 3.5–5.3)
PROT SERPL-MCNC: 7 G/DL (ref 6.3–8.7)
RBC # BLD AUTO: 3.14 10*6/MM3 (ref 4.2–5.4)
SODIUM BLD-SCNC: 140 MMOL/L (ref 135–145)
WBC NRBC COR # BLD: 11.5 10*3/MM3 (ref 4.8–10.8)

## 2017-09-21 PROCEDURE — 63510000001 EPOETIN ALFA PER 1000 UNITS: Performed by: INTERNAL MEDICINE

## 2017-09-21 PROCEDURE — 80053 COMPREHEN METABOLIC PANEL: CPT

## 2017-09-21 PROCEDURE — 99214 OFFICE O/P EST MOD 30 MIN: CPT | Performed by: INTERNAL MEDICINE

## 2017-09-21 PROCEDURE — 85025 COMPLETE CBC W/AUTO DIFF WBC: CPT

## 2017-09-21 PROCEDURE — 96372 THER/PROPH/DIAG INJ SC/IM: CPT

## 2017-09-21 PROCEDURE — 36415 COLL VENOUS BLD VENIPUNCTURE: CPT

## 2017-09-21 RX ADMIN — ERYTHROPOIETIN 40000 UNITS: 40000 INJECTION, SOLUTION INTRAVENOUS; SUBCUTANEOUS at 10:03

## 2017-09-21 NOTE — PROGRESS NOTES
[facility]  HEMATOLOGY & ONCOLOGY        Subjective     VISIT DIAGNOSIS:   No diagnosis found.    REASON FOR VISIT:     No chief complaint on file.       HEMATOLOGY / ONCOLOGY HISTORY:Ms. Shrestha is a 55 year old female with past medical history of hypertension, chronic kidney disease, diabetes insulindependent  type 2   No history exists.     [No treatment plan]  Cancer Staging Information:  No matching staging information was found for the patient.      INTERVAL HISTORY  Patient ID: Maria C Shrestha is a 57 y.o. year old female   Anemia secondary to chronic kidney disease, Stage III. Patient has not required VALERIE therapy this far.  2. Iron deficiency in the setting of chronic kidney disease, has benefitted from Feraheme. Had Feraheme in January, hemoglobin has  now near normalized at 10.7        Review of Systems   Constitutional: Negative.    HENT: Negative.    Eyes: Negative.    Respiratory: Negative.    Cardiovascular: Negative.    Gastrointestinal: Negative.    Endocrine: Negative.    Genitourinary: Negative.    Musculoskeletal: Negative.    Skin: Negative.    Allergic/Immunologic: Negative.    Neurological: Negative.    Hematological: Negative.    Psychiatric/Behavioral: Negative.             Medications:    Current Outpatient Prescriptions   Medication Sig Dispense Refill   • allopurinol (ZYLOPRIM) 100 MG tablet Take 100 mg by mouth 2 (Two) Times a Day.     • aspirin 81 MG tablet Take 81 mg by mouth Daily.     • B-D ULTRA-FINE 33 LANCETS misc Use 4 x daily 120 each 11   • busPIRone (BUSPAR) 10 MG tablet Take 10 mg by mouth 2 (Two) Times a Day.     • calcitriol (ROCALTROL) 0.25 MCG capsule Take 0.25 mcg by mouth 3 (Three) Times a Week.     • Calcium Carb-Cholecalciferol (CALCIUM-VITAMIN D3) 600-400 MG-UNIT tablet Take 1 tablet by mouth 2 (Two) Times a Day.     • carvedilol (COREG) 3.125 MG tablet Take 3.125 mg by mouth 2 (Two) Times a Day.     • cetirizine (zyrTEC) 10 MG tablet Take 10 mg by mouth  "As Needed.     • cholecalciferol (VITAMIN D3) 40929 UNITS capsule Take 50,000 Units by mouth Every 7 (Seven) Days.     • citalopram (CeleXA) 20 MG tablet Take 40 mg by mouth Daily.     • Dulaglutide (TRULICITY) 1.5 MG/0.5ML solution pen-injector Inject 1.5 mg under the skin Every 7 (Seven) Days. 12 pen 3   • ferrous sulfate 325 (65 FE) MG tablet Take 1 tablet by mouth Daily With Breakfast. (Patient taking differently: Take 325 mg by mouth 2 (Two) Times a Day.) 60 tablet 3   • furosemide (LASIX) 40 MG tablet Take 1 tablet by mouth Daily. 90 tablet 3   • Glucose Blood (BLOOD GLUCOSE TEST) strip Use 4 x daily, use any brand covered by insurance or same brand as before 120 each 11   • insulin glargine (LANTUS) 100 UNIT/ML injection Inject up to 100 u daily (Patient taking differently: Inject 192 Units under the skin Every Night. Inject up to 100 u daily) 9 each 3   • Insulin Syringe 31G X 5/16\" 1 ML misc 4 x daily 120 each 11   • Insulin Syringe-Needle U-100 31G X 5/16\" 0.3 ML misc use as indicated 4 times daily. 120 each 11   • lamoTRIgine (LaMICtal) 50 MG tablet dispersible disintegrating tablet Take 50 mg by mouth Daily.     • levothyroxine (SYNTHROID) 50 MCG tablet Take 1 tablet by mouth Daily. 90 tablet 3   • lisinopril (PRINIVIL,ZESTRIL) 20 MG tablet Take 20 mg by mouth 2 (Two) Times a Day.     • Multiple Vitamins-Minerals (MULTIVITAMIN ADULT PO) Take 1 tablet by mouth Daily.     • potassium gluconate 595 MG tablet tablet Take 595 mg by mouth 2 (Two) Times a Day.     • rosuvastatin (CRESTOR) 10 MG tablet Take 10 mg by mouth Every Other Day.     • sodium bicarbonate 650 MG tablet Take 650 mg by mouth 3 (Three) Times a Day.       No current facility-administered medications for this visit.        ALLERGIES:    Allergies   Allergen Reactions   • Codeine Nausea Only       Objective      @VITALS    Current Status 8/16/2017   ECOG score 3       General Appearance: Patient is awake, alert, oriented and in no acute " distress. Patient is welldeveloped, wellnourished, and appears stated age.  HEENT: Normocephalic. Sclerae clear, conjunctiva pink, extraocular movements intact, pupils, round, reactive to light and  accommodation. Mouth and throat are clear with moist oral mucosa.  NECK: Supple, no jugular venous distention, thyroid not enlarged.  LYMPH: No cervical, supraclavicular, axillary, or inguinal lymphadenopathy.  CHEST: Equal bilateral expansion, AP  diameter normal, resonant percussion note  LUNGS: Good air movement, no rales, rhonchi, rubs or wheezes with auscultation  CARDIO: Regular sinus rhythm, no murmurs, gallops or rubs.  ABDOMEN: Nondistended, soft, No tenderness, no guarding, no rebound, No hepatosplenomegaly. No abdominal masses. Bowel sounds positive. No hernia  GENITALIA: Not examined.  BREASTS: Not examined.  MUSKEL: No joint swelling, decreased motion, or inflammation  EXTREMS: No edema, clubbing, cyanosis, No varicose veins.  NEURO: Grossly nonfocal, Gait is coordinated and smooth, Cognition is preserved.  SKIN: No rashes, no ecchymoses, no petechia.  PSYCH: Oriented to time, place and person. Memory is preserved. Mood and affect appear normal      RECENT LABS:  No visits with results within 7 Day(s) from this visit.  Latest known visit with results is:    Orders Only on 08/16/2017   Component Date Value Ref Range Status   • Glucose 08/16/2017 121* 75 - 110 mg/dL Final   • BUN 08/16/2017 39* 7 - 26 mg/dL Final   • Creatinine 08/16/2017 2.30* 0.70 - 1.40 mg/dL Final   • Sodium 08/16/2017 143  137 - 145 mmol/L Final   • Potassium 08/16/2017 4.2  3.6 - 5.0 mmol/L Final   • Chloride 08/16/2017 104  98 - 107 mmol/L Final   • CO2 08/16/2017 25.0  22.0 - 30.0 mmol/L Final   • Calcium 08/16/2017 10.5* 8.4 - 10.2 mg/dL Final   • Total Protein 08/16/2017 7.7  6.3 - 8.2 g/dL Final   • Albumin 08/16/2017 4.00  3.50 - 5.00 g/dL Final   • ALT (SGPT) 08/16/2017 20  9 - 52 U/L Final   • AST (SGOT) 08/16/2017 17  5 - 40 U/L  Final   • Alkaline Phosphatase 08/16/2017 74  38 - 126 U/L Final   • Total Bilirubin 08/16/2017 0.3  0.2 - 1.3 mg/dL Final   • eGFR Non African Amer 08/16/2017 22* >60 mL/min/1.73 Final   • Globulin 08/16/2017 3.7  gm/dL Final   • A/G Ratio 08/16/2017 1.1  g/dL Final   • BUN/Creatinine Ratio 08/16/2017 17.0  7.0 - 25.0 Final   • Anion Gap 08/16/2017 14.0  mmol/L Final   • WBC 08/16/2017 9.80  4.80 - 10.80 10*3/mm3 Final   • RBC 08/16/2017 3.42* 4.20 - 5.40 10*6/mm3 Final   • Hemoglobin 08/16/2017 10.4* 12.0 - 16.0 g/dL Final   • Hematocrit 08/16/2017 32.5* 37.0 - 47.0 % Final   • MCV 08/16/2017 94.9  81.0 - 99.0 fL Final   • MCH 08/16/2017 30.4  27.0 - 31.0 pg Final   • MCHC 08/16/2017 32.0* 33.0 - 37.0 g/dL Final   • RDW 08/16/2017 18.1* 11.5 - 14.5 % Final   • MPV 08/16/2017 8.6  6.0 - 12.0 fL Final   • Platelets 08/16/2017 221  130 - 400 10*3/mm3 Final   • Neutrophil % 08/16/2017 75.7  37.0 - 92.0 % Final   • Lymphocyte % 08/16/2017 18.5  10.0 - 58.5 % Final   • Auto Mixed Cells % 08/16/2017 5.8  0.2 - 15.1 % Final   • Neutrophils, Absolute 08/16/2017 7.40  2.00 - 7.80 10*3/mm3 Final   • Lymphocytes, Absolute 08/16/2017 1.80  0.80 - 7.00 10*3/mm3 Final   • Auto Mixed Cells # 08/16/2017 0.60  0.10 - 1.50 10*3/mm3 Final       RADIOLOGY:  No results found.         Assessment/Plan        Anemia secondary to chronic kidney disease, Stage III. Patient has not required VALERIE therapy this far.  2. Iron deficiency in the setting of chronic kidney disease, has benefitted from Feraheme. Had Feraheme in January, hemoglobin has  now near normalized at 10.3.  Plan continue watchful waiting return to clinic in 3 months time and check another CBC and iron studies.  If the iron is still low I may give additional intravenous iron.  Her CMS guidelines I cannot give her Procrit unless hemoglobin is less than 10.    Clinically patient asymptomatic.  I will redraw her iron studies today.  Hemoglobin is a 10.  Return to clinic in 3  months time.            Marco A Melo MD    9/21/2017    8:19 AM             [facility]  HEMATOLOGY & ONCOLOGY        Subjective     VISIT DIAGNOSIS:   No diagnosis found.    REASON FOR VISIT:     No chief complaint on file.       HEMATOLOGY / ONCOLOGY HISTORY:Ms. Shrestha is a 55 year old female with past medical history of hypertension, chronic kidney disease, diabetes insulindependent  type 2   No history exists.     [No treatment plan]  Cancer Staging Information:  No matching staging information was found for the patient.      INTERVAL HISTORY  Patient ID: Maria C Shrestha is a 57 y.o. year old female   Anemia secondary to chronic kidney disease, Stage III. Patient has not required VALERIE therapy this far.  2. Iron deficiency in the setting of chronic kidney disease, has benefitted from Feraheme. Had Feraheme in January, hemoglobin has  now near normalized at 10.7        Review of Systems   Constitutional: Negative.    HENT: Negative.    Eyes: Negative.    Respiratory: Negative.    Cardiovascular: Negative.    Gastrointestinal: Negative.    Endocrine: Negative.    Genitourinary: Negative.    Musculoskeletal: Negative.    Skin: Negative.    Allergic/Immunologic: Negative.    Neurological: Negative.    Hematological: Negative.    Psychiatric/Behavioral: Negative.             Medications:    Current Outpatient Prescriptions   Medication Sig Dispense Refill   • allopurinol (ZYLOPRIM) 100 MG tablet Take 100 mg by mouth 2 (Two) Times a Day.     • aspirin 81 MG tablet Take 81 mg by mouth Daily.     • B-D ULTRA-FINE 33 LANCETS misc Use 4 x daily 120 each 11   • busPIRone (BUSPAR) 10 MG tablet Take 10 mg by mouth 2 (Two) Times a Day.     • calcitriol (ROCALTROL) 0.25 MCG capsule Take 0.25 mcg by mouth 3 (Three) Times a Week.     • Calcium Carb-Cholecalciferol (CALCIUM-VITAMIN D3) 600-400 MG-UNIT tablet Take 1 tablet by mouth 2 (Two) Times a Day.     • carvedilol (COREG) 3.125 MG tablet Take 3.125 mg by mouth 2  "(Two) Times a Day.     • cetirizine (zyrTEC) 10 MG tablet Take 10 mg by mouth As Needed.     • cholecalciferol (VITAMIN D3) 69131 UNITS capsule Take 50,000 Units by mouth Every 7 (Seven) Days.     • citalopram (CeleXA) 20 MG tablet Take 40 mg by mouth Daily.     • Dulaglutide (TRULICITY) 1.5 MG/0.5ML solution pen-injector Inject 1.5 mg under the skin Every 7 (Seven) Days. 12 pen 3   • ferrous sulfate 325 (65 FE) MG tablet Take 1 tablet by mouth Daily With Breakfast. (Patient taking differently: Take 325 mg by mouth 2 (Two) Times a Day.) 60 tablet 3   • furosemide (LASIX) 40 MG tablet Take 1 tablet by mouth Daily. 90 tablet 3   • Glucose Blood (BLOOD GLUCOSE TEST) strip Use 4 x daily, use any brand covered by insurance or same brand as before 120 each 11   • insulin glargine (LANTUS) 100 UNIT/ML injection Inject up to 100 u daily (Patient taking differently: Inject 192 Units under the skin Every Night. Inject up to 100 u daily) 9 each 3   • Insulin Syringe 31G X 5/16\" 1 ML misc 4 x daily 120 each 11   • Insulin Syringe-Needle U-100 31G X 5/16\" 0.3 ML misc use as indicated 4 times daily. 120 each 11   • lamoTRIgine (LaMICtal) 50 MG tablet dispersible disintegrating tablet Take 50 mg by mouth Daily.     • levothyroxine (SYNTHROID) 50 MCG tablet Take 1 tablet by mouth Daily. 90 tablet 3   • lisinopril (PRINIVIL,ZESTRIL) 20 MG tablet Take 20 mg by mouth 2 (Two) Times a Day.     • Multiple Vitamins-Minerals (MULTIVITAMIN ADULT PO) Take 1 tablet by mouth Daily.     • potassium gluconate 595 MG tablet tablet Take 595 mg by mouth 2 (Two) Times a Day.     • rosuvastatin (CRESTOR) 10 MG tablet Take 10 mg by mouth Every Other Day.     • sodium bicarbonate 650 MG tablet Take 650 mg by mouth 3 (Three) Times a Day.       No current facility-administered medications for this visit.        ALLERGIES:    Allergies   Allergen Reactions   • Codeine Nausea Only       Objective      @VITALS    Current Status 8/16/2017   ECOG score 3 "       General Appearance: Patient is awake, alert, oriented and in no acute distress. Patient is welldeveloped, wellnourished, and appears stated age.  HEENT: Normocephalic. Sclerae clear, conjunctiva pink, extraocular movements intact, pupils, round, reactive to light and  accommodation. Mouth and throat are clear with moist oral mucosa.  NECK: Supple, no jugular venous distention, thyroid not enlarged.  LYMPH: No cervical, supraclavicular, axillary, or inguinal lymphadenopathy.  CHEST: Equal bilateral expansion, AP  diameter normal, resonant percussion note  LUNGS: Good air movement, no rales, rhonchi, rubs or wheezes with auscultation  CARDIO: Regular sinus rhythm, no murmurs, gallops or rubs.  ABDOMEN: Nondistended, soft, No tenderness, no guarding, no rebound, No hepatosplenomegaly. No abdominal masses. Bowel sounds positive. No hernia  GENITALIA: Not examined.  BREASTS: Not examined.  MUSKEL: No joint swelling, decreased motion, or inflammation  EXTREMS: No edema, clubbing, cyanosis, No varicose veins.  NEURO: Grossly nonfocal, Gait is coordinated and smooth, Cognition is preserved.  SKIN: No rashes, no ecchymoses, no petechia.  PSYCH: Oriented to time, place and person. Memory is preserved. Mood and affect appear normal      RECENT LABS:  No visits with results within 7 Day(s) from this visit.  Latest known visit with results is:    Orders Only on 08/16/2017   Component Date Value Ref Range Status   • Glucose 08/16/2017 121* 75 - 110 mg/dL Final   • BUN 08/16/2017 39* 7 - 26 mg/dL Final   • Creatinine 08/16/2017 2.30* 0.70 - 1.40 mg/dL Final   • Sodium 08/16/2017 143  137 - 145 mmol/L Final   • Potassium 08/16/2017 4.2  3.6 - 5.0 mmol/L Final   • Chloride 08/16/2017 104  98 - 107 mmol/L Final   • CO2 08/16/2017 25.0  22.0 - 30.0 mmol/L Final   • Calcium 08/16/2017 10.5* 8.4 - 10.2 mg/dL Final   • Total Protein 08/16/2017 7.7  6.3 - 8.2 g/dL Final   • Albumin 08/16/2017 4.00  3.50 - 5.00 g/dL Final   • ALT  (SGPT) 08/16/2017 20  9 - 52 U/L Final   • AST (SGOT) 08/16/2017 17  5 - 40 U/L Final   • Alkaline Phosphatase 08/16/2017 74  38 - 126 U/L Final   • Total Bilirubin 08/16/2017 0.3  0.2 - 1.3 mg/dL Final   • eGFR Non African Amer 08/16/2017 22* >60 mL/min/1.73 Final   • Globulin 08/16/2017 3.7  gm/dL Final   • A/G Ratio 08/16/2017 1.1  g/dL Final   • BUN/Creatinine Ratio 08/16/2017 17.0  7.0 - 25.0 Final   • Anion Gap 08/16/2017 14.0  mmol/L Final   • WBC 08/16/2017 9.80  4.80 - 10.80 10*3/mm3 Final   • RBC 08/16/2017 3.42* 4.20 - 5.40 10*6/mm3 Final   • Hemoglobin 08/16/2017 10.4* 12.0 - 16.0 g/dL Final   • Hematocrit 08/16/2017 32.5* 37.0 - 47.0 % Final   • MCV 08/16/2017 94.9  81.0 - 99.0 fL Final   • MCH 08/16/2017 30.4  27.0 - 31.0 pg Final   • MCHC 08/16/2017 32.0* 33.0 - 37.0 g/dL Final   • RDW 08/16/2017 18.1* 11.5 - 14.5 % Final   • MPV 08/16/2017 8.6  6.0 - 12.0 fL Final   • Platelets 08/16/2017 221  130 - 400 10*3/mm3 Final   • Neutrophil % 08/16/2017 75.7  37.0 - 92.0 % Final   • Lymphocyte % 08/16/2017 18.5  10.0 - 58.5 % Final   • Auto Mixed Cells % 08/16/2017 5.8  0.2 - 15.1 % Final   • Neutrophils, Absolute 08/16/2017 7.40  2.00 - 7.80 10*3/mm3 Final   • Lymphocytes, Absolute 08/16/2017 1.80  0.80 - 7.00 10*3/mm3 Final   • Auto Mixed Cells # 08/16/2017 0.60  0.10 - 1.50 10*3/mm3 Final       RADIOLOGY:  No results found.         Assessment/Plan        Anemia secondary to chronic kidney disease, Stage III. Patient has not required VALERIE therapy this far.  2. Iron deficiency in the setting of chronic kidney disease, has benefitted from Feraheme. Had Feraheme in January, hemoglobin has  now near normalized at 10.3.  Plan continue watchful waiting return to clinic in 3 months time and check another CBC and iron studies.  If the iron is still low I may give additional intravenous iron.  Her CMS guidelines I cannot give her Procrit unless hemoglobin is less than 10.    Clinically patient asymptomatic.  I will  redraw her iron studies today.  Hemoglobin is a 9.7gm Today. Per CMS guidelines Rx Procrit 40,000 u s/q every month.       Marco A Melo MD    9/21/2017    8:19 AM

## 2017-09-29 ENCOUNTER — LAB (OUTPATIENT)
Dept: LAB | Facility: HOSPITAL | Age: 57
End: 2017-09-29

## 2017-09-29 DIAGNOSIS — N18.9 ANEMIA IN CKD (CHRONIC KIDNEY DISEASE): ICD-10-CM

## 2017-09-29 DIAGNOSIS — D63.1 ANEMIA IN CKD (CHRONIC KIDNEY DISEASE): ICD-10-CM

## 2017-09-29 LAB
25(OH)D3 SERPL-MCNC: 43.2 NG/ML (ref 30–100)
ALBUMIN SERPL-MCNC: 4 G/DL (ref 3.5–5)
ALBUMIN/GLOB SERPL: 1.2 G/DL (ref 1.1–2.5)
ALP SERPL-CCNC: 72 U/L (ref 24–120)
ALT SERPL W P-5'-P-CCNC: 32 U/L (ref 0–54)
ANION GAP SERPL CALCULATED.3IONS-SCNC: 10 MMOL/L (ref 4–13)
AST SERPL-CCNC: 18 U/L (ref 7–45)
AUTO MIXED CELLS #: 0.6 10*3/MM3 (ref 0.1–2.6)
AUTO MIXED CELLS %: 5.6 % (ref 0.1–24)
BILIRUB SERPL-MCNC: 0.5 MG/DL (ref 0.1–1)
BUN BLD-MCNC: 30 MG/DL (ref 5–21)
BUN/CREAT SERPL: 16.8
CALCIUM SPEC-SCNC: 8.4 MG/DL (ref 8.4–10.4)
CHLORIDE SERPL-SCNC: 110 MMOL/L (ref 98–110)
CHOLEST SERPL-MCNC: 155 MG/DL (ref 130–200)
CO2 SERPL-SCNC: 22 MMOL/L (ref 24–31)
CREAT BLD-MCNC: 1.79 MG/DL (ref 0.5–1.4)
ERYTHROCYTE [DISTWIDTH] IN BLOOD BY AUTOMATED COUNT: 17 % (ref 12–15)
GFR SERPL CREATININE-BSD FRML MDRD: 29 ML/MIN/1.73
GLOBULIN UR ELPH-MCNC: 3.4 GM/DL
GLUCOSE BLD-MCNC: 123 MG/DL (ref 70–100)
HBA1C MFR BLD: 6.2 %
HCT VFR BLD AUTO: 31.4 % (ref 37–47)
HDLC SERPL-MCNC: 35 MG/DL
HGB BLD-MCNC: 10.4 G/DL (ref 12–16)
LDLC SERPL CALC-MCNC: 68 MG/DL (ref 0–99)
LDLC/HDLC SERPL: 1.94 {RATIO}
LYMPHOCYTES # BLD AUTO: 1.9 10*3/MM3 (ref 0.8–7)
LYMPHOCYTES NFR BLD AUTO: 16.3 % (ref 15–45)
MCH RBC QN AUTO: 30.7 PG (ref 28–32)
MCHC RBC AUTO-ENTMCNC: 33.1 G/DL (ref 33–36)
MCV RBC AUTO: 92.6 FL (ref 82–98)
NEUTROPHILS # BLD AUTO: 9 10*3/MM3 (ref 1.5–8.3)
NEUTROPHILS NFR BLD AUTO: 78.1 % (ref 39–78)
PLATELET # BLD AUTO: 259 10*3/MM3 (ref 130–400)
PMV BLD AUTO: 8.7 FL (ref 6–12)
POTASSIUM BLD-SCNC: 4.1 MMOL/L (ref 3.5–5.3)
PROT SERPL-MCNC: 7.4 G/DL (ref 6.3–8.7)
RBC # BLD AUTO: 3.39 10*6/MM3 (ref 4.2–5.4)
SODIUM BLD-SCNC: 142 MMOL/L (ref 135–145)
TRIGL SERPL-MCNC: 261 MG/DL (ref 0–149)
TSH SERPL DL<=0.05 MIU/L-ACNC: 2.96 MIU/ML (ref 0.47–4.68)
VIT B12 BLD-MCNC: 811 PG/ML (ref 239–931)
VLDLC SERPL-MCNC: 52.2 MG/DL
WBC NRBC COR # BLD: 11.5 10*3/MM3 (ref 4.8–10.8)

## 2017-09-29 PROCEDURE — 83036 HEMOGLOBIN GLYCOSYLATED A1C: CPT | Performed by: INTERNAL MEDICINE

## 2017-09-29 PROCEDURE — 85025 COMPLETE CBC W/AUTO DIFF WBC: CPT | Performed by: INTERNAL MEDICINE

## 2017-09-29 PROCEDURE — 84443 ASSAY THYROID STIM HORMONE: CPT | Performed by: INTERNAL MEDICINE

## 2017-09-29 PROCEDURE — 80061 LIPID PANEL: CPT | Performed by: INTERNAL MEDICINE

## 2017-09-29 PROCEDURE — 36415 COLL VENOUS BLD VENIPUNCTURE: CPT | Performed by: INTERNAL MEDICINE

## 2017-09-29 PROCEDURE — 82043 UR ALBUMIN QUANTITATIVE: CPT | Performed by: INTERNAL MEDICINE

## 2017-09-29 PROCEDURE — 82306 VITAMIN D 25 HYDROXY: CPT | Performed by: INTERNAL MEDICINE

## 2017-09-29 PROCEDURE — 80053 COMPREHEN METABOLIC PANEL: CPT | Performed by: INTERNAL MEDICINE

## 2017-09-29 PROCEDURE — 82607 VITAMIN B-12: CPT | Performed by: INTERNAL MEDICINE

## 2017-09-29 PROCEDURE — 82570 ASSAY OF URINE CREATININE: CPT | Performed by: INTERNAL MEDICINE

## 2017-09-30 LAB
CREAT 24H UR-MCNC: 42.9 MG/DL
MICROALB/CRT. RATIO UR: 899.8 MG/G CREAT (ref 0–30)
MICROALBUMIN UR-MCNC: 386 UG/ML

## 2017-10-04 ENCOUNTER — OFFICE VISIT (OUTPATIENT)
Dept: ENDOCRINOLOGY | Facility: CLINIC | Age: 57
End: 2017-10-04

## 2017-10-04 VITALS
HEART RATE: 95 BPM | HEIGHT: 65 IN | BODY MASS INDEX: 48.82 KG/M2 | DIASTOLIC BLOOD PRESSURE: 60 MMHG | SYSTOLIC BLOOD PRESSURE: 148 MMHG | WEIGHT: 293 LBS

## 2017-10-04 DIAGNOSIS — E11.22 TYPE 2 DIABETES MELLITUS WITH STAGE 3 CHRONIC KIDNEY DISEASE, WITH LONG-TERM CURRENT USE OF INSULIN (HCC): Primary | ICD-10-CM

## 2017-10-04 DIAGNOSIS — E53.8 B12 DEFICIENCY: ICD-10-CM

## 2017-10-04 DIAGNOSIS — I15.2 HYPERTENSION ASSOCIATED WITH DIABETES (HCC): ICD-10-CM

## 2017-10-04 DIAGNOSIS — E11.69 MIXED DIABETIC HYPERLIPIDEMIA ASSOCIATED WITH TYPE 2 DIABETES MELLITUS (HCC): ICD-10-CM

## 2017-10-04 DIAGNOSIS — N18.30 TYPE 2 DIABETES MELLITUS WITH STAGE 3 CHRONIC KIDNEY DISEASE, WITH LONG-TERM CURRENT USE OF INSULIN (HCC): Primary | ICD-10-CM

## 2017-10-04 DIAGNOSIS — Z79.4 TYPE 2 DIABETES MELLITUS WITH STAGE 3 CHRONIC KIDNEY DISEASE, WITH LONG-TERM CURRENT USE OF INSULIN (HCC): Primary | ICD-10-CM

## 2017-10-04 DIAGNOSIS — E55.9 VITAMIN D DEFICIENCY: ICD-10-CM

## 2017-10-04 DIAGNOSIS — E78.2 MIXED DIABETIC HYPERLIPIDEMIA ASSOCIATED WITH TYPE 2 DIABETES MELLITUS (HCC): ICD-10-CM

## 2017-10-04 DIAGNOSIS — E11.59 HYPERTENSION ASSOCIATED WITH DIABETES (HCC): ICD-10-CM

## 2017-10-04 LAB — GLUCOSE BLDC GLUCOMTR-MCNC: 115 MG/DL (ref 70–130)

## 2017-10-04 PROCEDURE — 82962 GLUCOSE BLOOD TEST: CPT | Performed by: INTERNAL MEDICINE

## 2017-10-04 PROCEDURE — 99214 OFFICE O/P EST MOD 30 MIN: CPT | Performed by: INTERNAL MEDICINE

## 2017-10-04 RX ORDER — INSULIN GLARGINE 100 [IU]/ML
INJECTION, SOLUTION SUBCUTANEOUS
Qty: 6 EACH | Refills: 11 | Status: SHIPPED | OUTPATIENT
Start: 2017-10-04 | End: 2018-03-15 | Stop reason: SDUPTHER

## 2017-10-04 NOTE — PROGRESS NOTES
Maria C Shrestha is a 57 y.o. female who presents for  evaluation of   Chief Complaint   Patient presents with   • Diabetes         Primary Care / Referring Provider  Ole Soliman MD    History of Present Illness  Duration/Timing:  Diabetes mellitus type 2  timing constant    quality controlled    severity high     Severity (Complications/Hospitalizations)  Secondary Microvascular Complications:  Diabetic Nephropathy, No Diabetic Neuropathy      Context  Diabetes Regimen:  Insulin, Compliant with regimen  Blood Glucose Readings  near goal   Diet  counts carbs, eating only 45 g cho per meal   Exercise:  Does not exercise    Associated Signs/Symptoms  Hyperglycemic Symptoms:  No polyuria, No polydipsia, No polyphagia, Weight loss  Hypoglycemic Episodes:  No documented hypoglycemia    Past Medical History:   Diagnosis Date   • Acquired hypothyroidism 2/6/2017   • Allergic    • Anemia    • Anxiety    • Arthritis    • Chronic kidney disease     3rd stage   • Depression    • Disease of thyroid gland    • Dyslipidemia    • Elevated cholesterol    • Essential hypertension    • Gallbladder abscess    • Obesity    • Type 2 diabetes mellitus    • Type II diabetes mellitus, uncontrolled      Family History   Problem Relation Age of Onset   • Diabetes Other    • Cancer Mother    • Cancer Father    • Heart disease Father    • Diabetes Father    • Obesity Father    • Stroke Father    • Cancer Maternal Grandmother    • Diabetes Maternal Grandfather    • Cancer Paternal Grandmother    • Colon cancer Paternal Grandmother    • Diabetes Paternal Grandfather    • Heart disease Paternal Grandfather      Social History   Substance Use Topics   • Smoking status: Never Smoker   • Smokeless tobacco: Never Used   • Alcohol use No         Current Outpatient Prescriptions:   •  allopurinol (ZYLOPRIM) 100 MG tablet, Take 100 mg by mouth 2 (Two) Times a Day., Disp: , Rfl:   •  aspirin 81 MG tablet, Take 81 mg by mouth Daily., Disp: , Rfl:   •   "B-D ULTRA-FINE 33 LANCETS misc, Use 4 x daily, Disp: 120 each, Rfl: 11  •  busPIRone (BUSPAR) 10 MG tablet, Take 10 mg by mouth 2 (Two) Times a Day., Disp: , Rfl:   •  calcitriol (ROCALTROL) 0.25 MCG capsule, Take 0.25 mcg by mouth 3 (Three) Times a Week., Disp: , Rfl:   •  Calcium Carb-Cholecalciferol (CALCIUM-VITAMIN D3) 600-400 MG-UNIT tablet, Take 1 tablet by mouth 2 (Two) Times a Day., Disp: , Rfl:   •  carvedilol (COREG) 3.125 MG tablet, Take 3.125 mg by mouth 2 (Two) Times a Day., Disp: , Rfl:   •  cetirizine (zyrTEC) 10 MG tablet, Take 10 mg by mouth As Needed., Disp: , Rfl:   •  cholecalciferol (VITAMIN D3) 31147 UNITS capsule, Take 50,000 Units by mouth Every 7 (Seven) Days., Disp: , Rfl:   •  citalopram (CeleXA) 20 MG tablet, Take 40 mg by mouth Daily., Disp: , Rfl:   •  Dulaglutide (TRULICITY) 1.5 MG/0.5ML solution pen-injector, Inject 1.5 mg under the skin Every 7 (Seven) Days., Disp: 12 pen, Rfl: 3  •  furosemide (LASIX) 40 MG tablet, Take 1 tablet by mouth Daily. (Patient taking differently: Take 40 mg by mouth As Needed.), Disp: 90 tablet, Rfl: 3  •  Glucose Blood (BLOOD GLUCOSE TEST) strip, Use 4 x daily, use any brand covered by insurance or same brand as before, Disp: 120 each, Rfl: 11  •  insulin glargine (LANTUS) 100 UNIT/ML injection, Inject up to 100 u daily (Patient taking differently: Inject 192 Units under the skin Every Night. Inject up to 100 u daily), Disp: 9 each, Rfl: 3  •  insulin regular (HUMULIN R) 500 UNIT/ML CONCENTRATED injection, Inject 10 Units under the skin 3 (Three) Times a Day Before Meals., Disp: , Rfl:   •  Insulin Syringe 31G X 5/16\" 1 ML misc, 4 x daily, Disp: 120 each, Rfl: 11  •  Insulin Syringe-Needle U-100 31G X 5/16\" 0.3 ML misc, use as indicated 4 times daily., Disp: 120 each, Rfl: 11  •  lamoTRIgine (LaMICtal) 50 MG tablet dispersible disintegrating tablet, Take 50 mg by mouth Daily., Disp: , Rfl:   •  levothyroxine (SYNTHROID) 50 MCG tablet, Take 1 tablet by " mouth Daily., Disp: 90 tablet, Rfl: 3  •  lisinopril (PRINIVIL,ZESTRIL) 20 MG tablet, Take 20 mg by mouth 2 (Two) Times a Day., Disp: , Rfl:   •  Multiple Vitamins-Minerals (MULTIVITAMIN ADULT PO), Take 1 tablet by mouth Daily., Disp: , Rfl:   •  potassium gluconate 595 MG tablet tablet, Take 595 mg by mouth 2 (Two) Times a Day., Disp: , Rfl:   •  rosuvastatin (CRESTOR) 10 MG tablet, Take 10 mg by mouth Every Other Day., Disp: , Rfl:   •  sodium bicarbonate 650 MG tablet, Take 650 mg by mouth 3 (Three) Times a Day., Disp: , Rfl:     Review of Systems    Review of Systems   Constitutional: Positive for unexpected weight change. Negative for activity change, appetite change, chills, diaphoresis, fatigue and fever.   HENT: Negative for congestion, drooling, ear discharge, ear pain, facial swelling, mouth sores, nosebleeds, postnasal drip, sinus pressure, sneezing, sore throat, tinnitus, trouble swallowing and voice change.    Eyes: Negative.  Negative for photophobia, pain, discharge, redness and itching.   Respiratory: Negative.  Negative for apnea, cough, choking, chest tightness, shortness of breath, wheezing and stridor.    Cardiovascular: Negative.  Negative for chest pain, palpitations and leg swelling.   Gastrointestinal: Negative.  Negative for abdominal distention, abdominal pain, constipation, diarrhea, nausea and vomiting.   Endocrine: Positive for polydipsia, polyphagia and polyuria. Negative for cold intolerance and heat intolerance.   Genitourinary: Negative for difficulty urinating, dysuria, flank pain and frequency.   Musculoskeletal: Positive for arthralgias, back pain and myalgias. Negative for gait problem, joint swelling, neck pain and neck stiffness.   Skin: Negative for color change, pallor, rash and wound.   Allergic/Immunologic: Negative for environmental allergies, food allergies and immunocompromised state.   Neurological: Negative for dizziness, tremors, seizures, syncope, facial asymmetry,  "speech difficulty, weakness, light-headedness, numbness and headaches.   Hematological: Negative for adenopathy. Does not bruise/bleed easily.   Psychiatric/Behavioral: Negative for agitation, behavioral problems, confusion, decreased concentration, dysphoric mood, hallucinations, self-injury, sleep disturbance and suicidal ideas. The patient is not nervous/anxious and is not hyperactive.         Objective:     /60 (BP Location: Right arm, Patient Position: Sitting, Cuff Size: Adult)  Pulse 95  Ht 65\" (165.1 cm)  Wt (!) 418 lb 14.4 oz (190 kg)  LMP  (LMP Unknown)  BMI 69.71 kg/m2    Physical Exam   Constitutional: She is oriented to person, place, and time. She appears well-developed.   HENT:   Head: Normocephalic.   Right Ear: External ear normal.   Left Ear: External ear normal.   Nose: Nose normal.   Eyes: Conjunctivae and EOM are normal. No scleral icterus.   Neck: Normal range of motion. Neck supple. No tracheal deviation present. No thyromegaly present.   Cardiovascular: Normal rate, regular rhythm and intact distal pulses.  Exam reveals no gallop and no friction rub.    Murmur heard.  Systolic murmur, carotid bruit    Pulmonary/Chest: Effort normal and breath sounds normal. No stridor. No respiratory distress. She has no wheezes. She has no rales. She exhibits no tenderness.   Abdominal: Soft. Bowel sounds are normal. She exhibits no distension and no mass. There is no tenderness. There is no rebound and no guarding.   Musculoskeletal: Normal range of motion. She exhibits no tenderness or deformity.   Lymphadenopathy:     She has no cervical adenopathy.   Neurological: She is alert and oriented to person, place, and time. She displays normal reflexes. She exhibits normal muscle tone. Coordination normal.   Skin: No rash noted. No erythema. No pallor.   Psychiatric: She has a normal mood and affect. Her behavior is normal. Judgment and thought content normal.       Lab Review    Results for orders " placed or performed in visit on 10/04/17   POC Glucose Fingerstick   Result Value Ref Range    Glucose 115 70 - 130 mg/dL           Assessment/Plan       ICD-10-CM ICD-9-CM   1. Type 2 diabetes mellitus with stage 3 chronic kidney disease, with long-term current use of insulin E11.22 250.40    N18.3 585.3    Z79.4 V58.67       Glycemic Management:   Lab Results   Component Value Date    HGBA1C 6.2 09/29/2017    HGBA1C 6.2 06/08/2017    HGBA1C 5.9 01/30/2017     Lab Results   Component Value Date    GLUCOSE 123 (H) 09/29/2017    BUN 30 (H) 09/29/2017    CREATININE 1.79 (H) 09/29/2017    EGFRIFNONA 29 (L) 09/29/2017    BCR 16.8 09/29/2017    CO2 22.0 (L) 09/29/2017    CALCIUM 8.4 09/29/2017    ALBUMIN 4.00 09/29/2017    LABIL2 1.2 09/29/2017    AST 18 09/29/2017    ALT 32 09/29/2017     Lab Results   Component Value Date    WBC 11.50 (H) 09/29/2017    HGB 10.4 (L) 09/29/2017    HCT 31.4 (L) 09/29/2017    MCV 92.6 09/29/2017     09/29/2017       Trulicity 1.5 mg weekly       Humulin U 500     At this point 50 units for meals    In addition doing lantus 190 units    invokana stopped due to decline in renal function    Lipid Management    Lab Results   Component Value Date    TRIG 261 (H) 09/29/2017    TRIG 367 (H) 06/08/2017    TRIG 293 (H) 01/30/2017     Lab Results   Component Value Date    HDL 35 (L) 09/29/2017    HDL 33 (L) 06/08/2017    HDL 36 (L) 01/30/2017     Lab Results   Component Value Date    LDLCALC 68 09/29/2017    LDLCALC 65 06/08/2017    LDLCALC 83 01/30/2017     Lab Results   Component Value Date    LDL 79 07/14/2016    LDL 90 04/14/2016       Lab Results   Component Value Date    LDLDIRECT 94 12/09/2015    LDLDIRECT 73 08/05/2015    LDLDIRECT 88 04/08/2015             on Crestor 20 mg daily   Generic causing myalgias    Return to brand name       Blood Pressure Management:    Vitals:    10/04/17 1623   BP: 148/60   Pulse: 95         Per nephrology       Microvascular Complication Monitoring:  No  Microalbuminuria, No Diabetic Retinopathy, Date of last eye exam 07/18/2016, No Diabetic Neuropathy  on ACE i     ckd stage III-IV    followed by nephrology     --      Lab Results   Component Value Date    CREATININE 1.79 (H) 09/29/2017    BUN 30 (H) 09/29/2017     09/29/2017    K 4.1 09/29/2017     09/29/2017    CO2 22.0 (L) 09/29/2017         Lab Results   Component Value Date    CREATININE 1.79 (H) 09/29/2017    CREATININE 2.08 (H) 09/21/2017    CREATININE 2.30 (H) 08/16/2017         Immunizations:  Last pneumococcal immunization pneumovax before age 65     Flu Shot will have in Mid Nov 2016       Preventive Care:  No smoking      Weight Related:   Wt Readings from Last 3 Encounters:   10/04/17 (!) 418 lb 14.4 oz (190 kg)   09/21/17 (!) 409 lb 9.6 oz (186 kg)   09/21/17 (!) 409 lb 9.6 oz (186 kg)     Body mass index is 69.71 kg/(m^2).      prefers no bariatric surgery    Now on cpap       Bone Health    Lab Results   Component Value Date    CALCIUM 8.4 09/29/2017     On rocatrol per nephro for JARETH       Thyroid Health  Lab Results   Component Value Date    TSH 2.960 09/29/2017     On levothyroxine 50 ug daily    Other Diabetes Related Aspects       Lab Results   Component Value Date    [id number] 811 09/29/2017       Anemia , managed by nephrology   Deciding vs epo vs iron       Systolic Murmur, AS? Echo   Could be due to anemia     Echo and carotid US from 7-17 , normal echo and less than 50% blockage in carotids    I reviewed and summarized records from Ole Soliman MD from 2016 and I reviewed / ordered labs.     Orders Placed This Encounter   Procedures   • POC Glucose Fingerstick         A copy of my note was sent to Ole Soliman MD    Please see my above opinion and suggestions.

## 2017-10-13 DIAGNOSIS — N18.30 ANEMIA IN STAGE 3 CHRONIC KIDNEY DISEASE (HCC): Primary | ICD-10-CM

## 2017-10-13 DIAGNOSIS — D63.1 ANEMIA IN STAGE 3 CHRONIC KIDNEY DISEASE (HCC): Primary | ICD-10-CM

## 2017-10-18 ENCOUNTER — INFUSION (OUTPATIENT)
Dept: ONCOLOGY | Facility: HOSPITAL | Age: 57
End: 2017-10-18
Attending: INTERNAL MEDICINE

## 2017-10-18 ENCOUNTER — OFFICE VISIT (OUTPATIENT)
Dept: ONCOLOGY | Facility: CLINIC | Age: 57
End: 2017-10-18

## 2017-10-18 ENCOUNTER — LAB (OUTPATIENT)
Dept: LAB | Facility: HOSPITAL | Age: 57
End: 2017-10-18
Attending: INTERNAL MEDICINE

## 2017-10-18 VITALS
SYSTOLIC BLOOD PRESSURE: 146 MMHG | HEIGHT: 65 IN | DIASTOLIC BLOOD PRESSURE: 52 MMHG | TEMPERATURE: 97.8 F | WEIGHT: 293 LBS | HEART RATE: 69 BPM | RESPIRATION RATE: 18 BRPM | OXYGEN SATURATION: 99 % | BODY MASS INDEX: 48.82 KG/M2

## 2017-10-18 VITALS
TEMPERATURE: 97.3 F | BODY MASS INDEX: 48.82 KG/M2 | HEART RATE: 88 BPM | WEIGHT: 293 LBS | RESPIRATION RATE: 16 BRPM | SYSTOLIC BLOOD PRESSURE: 132 MMHG | DIASTOLIC BLOOD PRESSURE: 58 MMHG | HEIGHT: 65 IN | OXYGEN SATURATION: 97 %

## 2017-10-18 DIAGNOSIS — N18.30 ANEMIA IN STAGE 3 CHRONIC KIDNEY DISEASE (HCC): ICD-10-CM

## 2017-10-18 DIAGNOSIS — D63.1 ANEMIA IN STAGE 3 CHRONIC KIDNEY DISEASE (HCC): ICD-10-CM

## 2017-10-18 DIAGNOSIS — N18.9 ANEMIA IN CKD (CHRONIC KIDNEY DISEASE): ICD-10-CM

## 2017-10-18 DIAGNOSIS — N18.9 ANEMIA IN CKD (CHRONIC KIDNEY DISEASE): Primary | ICD-10-CM

## 2017-10-18 DIAGNOSIS — D63.1 ANEMIA IN CKD (CHRONIC KIDNEY DISEASE): Primary | ICD-10-CM

## 2017-10-18 DIAGNOSIS — D63.1 ANEMIA IN CKD (CHRONIC KIDNEY DISEASE): ICD-10-CM

## 2017-10-18 LAB
ALBUMIN SERPL-MCNC: 3.7 G/DL (ref 3.5–5)
ALBUMIN/GLOB SERPL: 1.1 G/DL (ref 1.1–2.5)
ALP SERPL-CCNC: 77 U/L (ref 24–120)
ALT SERPL W P-5'-P-CCNC: 30 U/L (ref 0–54)
ANION GAP SERPL CALCULATED.3IONS-SCNC: 11 MMOL/L (ref 4–13)
AST SERPL-CCNC: 23 U/L (ref 7–45)
AUTO MIXED CELLS #: 0.6 10*3/MM3 (ref 0.1–2.6)
AUTO MIXED CELLS %: 5.9 % (ref 0.1–24)
BILIRUB SERPL-MCNC: 0.2 MG/DL (ref 0.1–1)
BUN BLD-MCNC: 39 MG/DL (ref 5–21)
BUN/CREAT SERPL: 20.2
CALCIUM SPEC-SCNC: 9.3 MG/DL (ref 8.4–10.4)
CHLORIDE SERPL-SCNC: 104 MMOL/L (ref 98–110)
CO2 SERPL-SCNC: 24 MMOL/L (ref 24–31)
CREAT BLD-MCNC: 1.93 MG/DL (ref 0.5–1.4)
ERYTHROCYTE [DISTWIDTH] IN BLOOD BY AUTOMATED COUNT: 15.5 % (ref 12–15)
GFR SERPL CREATININE-BSD FRML MDRD: 27 ML/MIN/1.73
GLOBULIN UR ELPH-MCNC: 3.4 GM/DL
GLUCOSE BLD-MCNC: 252 MG/DL (ref 70–100)
HCT VFR BLD AUTO: 30.9 % (ref 37–47)
HGB BLD-MCNC: 10.3 G/DL (ref 12–16)
HOLD SPECIMEN: NORMAL
LYMPHOCYTES # BLD AUTO: 1.4 10*3/MM3 (ref 0.8–7)
LYMPHOCYTES NFR BLD AUTO: 14 % (ref 15–45)
MCH RBC QN AUTO: 30.1 PG (ref 28–32)
MCHC RBC AUTO-ENTMCNC: 33.3 G/DL (ref 33–36)
MCV RBC AUTO: 90.4 FL (ref 82–98)
NEUTROPHILS # BLD AUTO: 8.3 10*3/MM3 (ref 1.5–8.3)
NEUTROPHILS NFR BLD AUTO: 80.1 % (ref 39–78)
PLATELET # BLD AUTO: 212 10*3/MM3 (ref 130–400)
PMV BLD AUTO: 8.7 FL (ref 6–12)
POTASSIUM BLD-SCNC: 4.2 MMOL/L (ref 3.5–5.3)
PROT SERPL-MCNC: 7.1 G/DL (ref 6.3–8.7)
RBC # BLD AUTO: 3.42 10*6/MM3 (ref 4.2–5.4)
SODIUM BLD-SCNC: 139 MMOL/L (ref 135–145)
WBC NRBC COR # BLD: 10.3 10*3/MM3 (ref 4.8–10.8)

## 2017-10-18 PROCEDURE — 99214 OFFICE O/P EST MOD 30 MIN: CPT | Performed by: INTERNAL MEDICINE

## 2017-10-18 RX ADMIN — ERYTHROPOIETIN 40000 UNITS: 40000 INJECTION, SOLUTION INTRAVENOUS; SUBCUTANEOUS at 10:05

## 2017-10-18 NOTE — PROGRESS NOTES
0954 Called s/w Dorys Wren RN regarding Hgb of 10.3. D/W Dr. Melo continue with procrit as ordered.  Mellissa GRANT

## 2017-10-18 NOTE — PROGRESS NOTES
[facility]  HEMATOLOGY & ONCOLOGY        Subjective     VISIT DIAGNOSIS:   Encounter Diagnoses   Name Primary?   • Anemia in CKD (chronic kidney disease)    • Anemia in stage 3 chronic kidney disease        REASON FOR VISIT:     No chief complaint on file.       HEMATOLOGY / ONCOLOGY HISTORY:Ms. Shrestha is a 55 year old female with past medical history of hypertension, chronic kidney disease, diabetes insulindependent  type 2   No history exists.     [No treatment plan]  Cancer Staging Information:  No matching staging information was found for the patient.      INTERVAL HISTORY  Patient ID: Maria C Shrestha is a 57 y.o. year old female   Anemia secondary to chronic kidney disease, Stage III. Patient has not required VALERIE therapy this far.  2. Iron deficiency in the setting of chronic kidney disease, has benefitted from Feraheme. Had Feraheme in January, hemoglobin has  now near normalized at 10.7        Review of Systems   Constitutional: Negative.    HENT: Negative.    Eyes: Negative.    Respiratory: Negative.    Cardiovascular: Negative.    Gastrointestinal: Negative.    Endocrine: Negative.    Genitourinary: Negative.    Musculoskeletal: Negative.    Skin: Negative.    Allergic/Immunologic: Negative.    Neurological: Negative.    Hematological: Negative.    Psychiatric/Behavioral: Negative.             Medications:    Current Outpatient Prescriptions   Medication Sig Dispense Refill   • allopurinol (ZYLOPRIM) 100 MG tablet Take 100 mg by mouth 2 (Two) Times a Day.     • aspirin 81 MG tablet Take 81 mg by mouth Daily.     • B-D ULTRA-FINE 33 LANCETS misc Use 4 x daily 120 each 11   • busPIRone (BUSPAR) 10 MG tablet Take 10 mg by mouth 2 (Two) Times a Day.     • calcitriol (ROCALTROL) 0.25 MCG capsule Take 0.25 mcg by mouth 3 (Three) Times a Week.     • Calcium Carb-Cholecalciferol (CALCIUM-VITAMIN D3) 600-400 MG-UNIT tablet Take 1 tablet by mouth 2 (Two) Times a Day.     • carvedilol (COREG) 3.125 MG  "tablet Take 3.125 mg by mouth 2 (Two) Times a Day.     • cetirizine (zyrTEC) 10 MG tablet Take 10 mg by mouth As Needed.     • cholecalciferol (VITAMIN D3) 08926 UNITS capsule Take 50,000 Units by mouth Every 7 (Seven) Days.     • citalopram (CeleXA) 20 MG tablet Take 40 mg by mouth Daily.     • Dulaglutide (TRULICITY) 1.5 MG/0.5ML solution pen-injector Inject 1.5 mg under the skin Every 7 (Seven) Days. 12 pen 3   • furosemide (LASIX) 40 MG tablet Take 1 tablet by mouth Daily. (Patient taking differently: Take 40 mg by mouth As Needed.) 90 tablet 3   • Glucose Blood (BLOOD GLUCOSE TEST) strip Use 4 x daily, use any brand covered by insurance or same brand as before 120 each 11   • insulin glargine (LANTUS) 100 UNIT/ML injection 200 units daily 6 each 11   • insulin regular (HUMULIN R) 500 UNIT/ML CONCENTRATED injection Inject 10 Units under the skin 3 (Three) Times a Day Before Meals.     • Insulin Syringe 31G X 5/16\" 1 ML misc 4 x daily 120 each 11   • Insulin Syringe-Needle U-100 31G X 5/16\" 0.3 ML misc use as indicated 4 times daily. 120 each 11   • lamoTRIgine (LaMICtal) 50 MG tablet dispersible disintegrating tablet Take 50 mg by mouth Daily.     • levothyroxine (SYNTHROID) 50 MCG tablet Take 1 tablet by mouth Daily. 90 tablet 3   • lisinopril (PRINIVIL,ZESTRIL) 20 MG tablet Take 20 mg by mouth 2 (Two) Times a Day.     • Multiple Vitamins-Minerals (MULTIVITAMIN ADULT PO) Take 1 tablet by mouth Daily.     • potassium gluconate 595 MG tablet tablet Take 595 mg by mouth 2 (Two) Times a Day.     • rosuvastatin (CRESTOR) 10 MG tablet Take 10 mg by mouth Every Other Day.     • sodium bicarbonate 650 MG tablet Take 650 mg by mouth 3 (Three) Times a Day.       No current facility-administered medications for this visit.        ALLERGIES:    Allergies   Allergen Reactions   • Codeine Nausea Only       Objective      @VITALS    Current Status 9/21/2017   ECOG score 1       General Appearance: Patient is awake, alert, " oriented and in no acute distress. Patient is welldeveloped, wellnourished, and appears stated age.  HEENT: Normocephalic. Sclerae clear, conjunctiva pink, extraocular movements intact, pupils, round, reactive to light and  accommodation. Mouth and throat are clear with moist oral mucosa.  NECK: Supple, no jugular venous distention, thyroid not enlarged.  LYMPH: No cervical, supraclavicular, axillary, or inguinal lymphadenopathy.  CHEST: Equal bilateral expansion, AP  diameter normal, resonant percussion note  LUNGS: Good air movement, no rales, rhonchi, rubs or wheezes with auscultation  CARDIO: Regular sinus rhythm, no murmurs, gallops or rubs.  ABDOMEN: Nondistended, soft, No tenderness, no guarding, no rebound, No hepatosplenomegaly. No abdominal masses. Bowel sounds positive. No hernia  GENITALIA: Not examined.  BREASTS: Not examined.  MUSKEL: No joint swelling, decreased motion, or inflammation  EXTREMS: No edema, clubbing, cyanosis, No varicose veins.  NEURO: Grossly nonfocal, Gait is coordinated and smooth, Cognition is preserved.  SKIN: No rashes, no ecchymoses, no petechia.  PSYCH: Oriented to time, place and person. Memory is preserved. Mood and affect appear normal      RECENT LABS:  Lab on 10/18/2017   Component Date Value Ref Range Status   • WBC 10/18/2017 10.30  4.80 - 10.80 10*3/mm3 Final   • RBC 10/18/2017 3.42* 4.20 - 5.40 10*6/mm3 Final   • Hemoglobin 10/18/2017 10.3* 12.0 - 16.0 g/dL Final   • Hematocrit 10/18/2017 30.9* 37.0 - 47.0 % Final   • MCV 10/18/2017 90.4  82.0 - 98.0 fL Final   • MCH 10/18/2017 30.1  28.0 - 32.0 pg Final   • MCHC 10/18/2017 33.3  33.0 - 36.0 g/dL Final   • RDW 10/18/2017 15.5* 12.0 - 15.0 % Final   • MPV 10/18/2017 8.7  6.0 - 12.0 fL Final   • Platelets 10/18/2017 212  130 - 400 10*3/mm3 Final   • Neutrophil % 10/18/2017 80.1* 39.0 - 78.0 % Final   • Lymphocyte % 10/18/2017 14.0* 15.0 - 45.0 % Final   • Auto Mixed Cells % 10/18/2017 5.9  0.1 - 24.0 % Final   •  Neutrophils, Absolute 10/18/2017 8.30  1.50 - 8.30 10*3/mm3 Final   • Lymphocytes, Absolute 10/18/2017 1.40  0.80 - 7.00 10*3/mm3 Final   • Auto Mixed Cells # 10/18/2017 0.60  0.10 - 2.60 10*3/mm3 Final       RADIOLOGY:  No results found.         Assessment/Plan        Anemia secondary to chronic kidney disease, Stage III. Patient has not required VALERIE therapy this far.  2. Iron deficiency in the setting of chronic kidney disease, has benefitted from Feraheme. Had Feraheme in January, hemoglobin has  now near normalized at 10.3.  Plan continue watchful waiting return to clinic in 3 months time and check another CBC and iron studies.  If the iron is still low I may give additional intravenous iron.  Her CMS guidelines I cannot give her Procrit unless hemoglobin is less than 10.    Clinically patient asymptomatic.  I will redraw her iron studies today.  Hemoglobin is a 10.  Return to clinic in 3 months time.            Marco A Melo MD    10/18/2017    9:41 AM             [facility]  HEMATOLOGY & ONCOLOGY        Subjective     VISIT DIAGNOSIS:   Encounter Diagnoses   Name Primary?   • Anemia in CKD (chronic kidney disease)    • Anemia in stage 3 chronic kidney disease        REASON FOR VISIT:     No chief complaint on file.       HEMATOLOGY / ONCOLOGY HISTORY:Ms. Shrestha is a 55 year old female with past medical history of hypertension, chronic kidney disease, diabetes insulindependent  type 2   No history exists.     [No treatment plan]  Cancer Staging Information:  No matching staging information was found for the patient.      INTERVAL HISTORY  Patient ID: Maria C Shrestha is a 57 y.o. year old female   Anemia secondary to chronic kidney disease, Stage III. Patient has not required VALERIE therapy this far.  2. Iron deficiency in the setting of chronic kidney disease, has benefitted from Feraheme. Had Feraheme in January, hemoglobin has  now near normalized at 10.7        Review of Systems   Constitutional:  "Negative.    HENT: Negative.    Eyes: Negative.    Respiratory: Negative.    Cardiovascular: Negative.    Gastrointestinal: Negative.    Endocrine: Negative.    Genitourinary: Negative.    Musculoskeletal: Negative.    Skin: Negative.    Allergic/Immunologic: Negative.    Neurological: Negative.    Hematological: Negative.    Psychiatric/Behavioral: Negative.             Medications:    Current Outpatient Prescriptions   Medication Sig Dispense Refill   • allopurinol (ZYLOPRIM) 100 MG tablet Take 100 mg by mouth 2 (Two) Times a Day.     • aspirin 81 MG tablet Take 81 mg by mouth Daily.     • B-D ULTRA-FINE 33 LANCETS misc Use 4 x daily 120 each 11   • busPIRone (BUSPAR) 10 MG tablet Take 10 mg by mouth 2 (Two) Times a Day.     • calcitriol (ROCALTROL) 0.25 MCG capsule Take 0.25 mcg by mouth 3 (Three) Times a Week.     • Calcium Carb-Cholecalciferol (CALCIUM-VITAMIN D3) 600-400 MG-UNIT tablet Take 1 tablet by mouth 2 (Two) Times a Day.     • carvedilol (COREG) 3.125 MG tablet Take 3.125 mg by mouth 2 (Two) Times a Day.     • cetirizine (zyrTEC) 10 MG tablet Take 10 mg by mouth As Needed.     • cholecalciferol (VITAMIN D3) 41669 UNITS capsule Take 50,000 Units by mouth Every 7 (Seven) Days.     • citalopram (CeleXA) 20 MG tablet Take 40 mg by mouth Daily.     • Dulaglutide (TRULICITY) 1.5 MG/0.5ML solution pen-injector Inject 1.5 mg under the skin Every 7 (Seven) Days. 12 pen 3   • furosemide (LASIX) 40 MG tablet Take 1 tablet by mouth Daily. (Patient taking differently: Take 40 mg by mouth As Needed.) 90 tablet 3   • Glucose Blood (BLOOD GLUCOSE TEST) strip Use 4 x daily, use any brand covered by insurance or same brand as before 120 each 11   • insulin glargine (LANTUS) 100 UNIT/ML injection 200 units daily 6 each 11   • insulin regular (HUMULIN R) 500 UNIT/ML CONCENTRATED injection Inject 10 Units under the skin 3 (Three) Times a Day Before Meals.     • Insulin Syringe 31G X 5/16\" 1 ML misc 4 x daily 120 each 11 " "  • Insulin Syringe-Needle U-100 31G X 5/16\" 0.3 ML misc use as indicated 4 times daily. 120 each 11   • lamoTRIgine (LaMICtal) 50 MG tablet dispersible disintegrating tablet Take 50 mg by mouth Daily.     • levothyroxine (SYNTHROID) 50 MCG tablet Take 1 tablet by mouth Daily. 90 tablet 3   • lisinopril (PRINIVIL,ZESTRIL) 20 MG tablet Take 20 mg by mouth 2 (Two) Times a Day.     • Multiple Vitamins-Minerals (MULTIVITAMIN ADULT PO) Take 1 tablet by mouth Daily.     • potassium gluconate 595 MG tablet tablet Take 595 mg by mouth 2 (Two) Times a Day.     • rosuvastatin (CRESTOR) 10 MG tablet Take 10 mg by mouth Every Other Day.     • sodium bicarbonate 650 MG tablet Take 650 mg by mouth 3 (Three) Times a Day.       No current facility-administered medications for this visit.        ALLERGIES:    Allergies   Allergen Reactions   • Codeine Nausea Only       Objective      @VITALS    Current Status 9/21/2017   ECOG score 1       General Appearance: Patient is awake, alert, oriented and in no acute distress. Patient is welldeveloped, wellnourished, and appears stated age.  HEENT: Normocephalic. Sclerae clear, conjunctiva pink, extraocular movements intact, pupils, round, reactive to light and  accommodation. Mouth and throat are clear with moist oral mucosa.  NECK: Supple, no jugular venous distention, thyroid not enlarged.  LYMPH: No cervical, supraclavicular, axillary, or inguinal lymphadenopathy.  CHEST: Equal bilateral expansion, AP  diameter normal, resonant percussion note  LUNGS: Good air movement, no rales, rhonchi, rubs or wheezes with auscultation  CARDIO: Regular sinus rhythm, no murmurs, gallops or rubs.  ABDOMEN: Nondistended, soft, No tenderness, no guarding, no rebound, No hepatosplenomegaly. No abdominal masses. Bowel sounds positive. No hernia  GENITALIA: Not examined.  BREASTS: Not examined.  MUSKEL: No joint swelling, decreased motion, or inflammation  EXTREMS: No edema, clubbing, cyanosis, No varicose " veins.  NEURO: Grossly nonfocal, Gait is coordinated and smooth, Cognition is preserved.  SKIN: No rashes, no ecchymoses, no petechia.  PSYCH: Oriented to time, place and person. Memory is preserved. Mood and affect appear normal      RECENT LABS:  Lab on 10/18/2017   Component Date Value Ref Range Status   • WBC 10/18/2017 10.30  4.80 - 10.80 10*3/mm3 Final   • RBC 10/18/2017 3.42* 4.20 - 5.40 10*6/mm3 Final   • Hemoglobin 10/18/2017 10.3* 12.0 - 16.0 g/dL Final   • Hematocrit 10/18/2017 30.9* 37.0 - 47.0 % Final   • MCV 10/18/2017 90.4  82.0 - 98.0 fL Final   • MCH 10/18/2017 30.1  28.0 - 32.0 pg Final   • MCHC 10/18/2017 33.3  33.0 - 36.0 g/dL Final   • RDW 10/18/2017 15.5* 12.0 - 15.0 % Final   • MPV 10/18/2017 8.7  6.0 - 12.0 fL Final   • Platelets 10/18/2017 212  130 - 400 10*3/mm3 Final   • Neutrophil % 10/18/2017 80.1* 39.0 - 78.0 % Final   • Lymphocyte % 10/18/2017 14.0* 15.0 - 45.0 % Final   • Auto Mixed Cells % 10/18/2017 5.9  0.1 - 24.0 % Final   • Neutrophils, Absolute 10/18/2017 8.30  1.50 - 8.30 10*3/mm3 Final   • Lymphocytes, Absolute 10/18/2017 1.40  0.80 - 7.00 10*3/mm3 Final   • Auto Mixed Cells # 10/18/2017 0.60  0.10 - 2.60 10*3/mm3 Final       RADIOLOGY:  No results found.         Assessment/Plan        Anemia secondary to chronic kidney disease, Stage III. Patient has not required VALERIE therapy this far.  2. Iron deficiency in the setting of chronic kidney disease, has benefitted from Feraheme. Had Feraheme in January, hemoglobin has  now near normalized at 10.3.  Plan continue watchful waiting return to clinic in 3 months time and check another CBC and iron studies.  If the iron is still low I may give additional intravenous iron.  Her CMS guidelines I cannot give her Procrit unless hemoglobin is less than 10.    Clinically patient asymptomatic.  I will redraw her iron studies today.  Hemoglobin is a 10.3gm Today. Per CMS guidelines Rx Procrit 40,000 u s/q every month.       Marco A Melo,  MD    10/18/2017    9:41 AM

## 2017-11-15 ENCOUNTER — INFUSION (OUTPATIENT)
Dept: ONCOLOGY | Facility: HOSPITAL | Age: 57
End: 2017-11-15
Attending: INTERNAL MEDICINE

## 2017-11-15 ENCOUNTER — LAB (OUTPATIENT)
Dept: LAB | Facility: HOSPITAL | Age: 57
End: 2017-11-15
Attending: INTERNAL MEDICINE

## 2017-11-15 ENCOUNTER — OFFICE VISIT (OUTPATIENT)
Dept: ONCOLOGY | Facility: CLINIC | Age: 57
End: 2017-11-15

## 2017-11-15 VITALS
WEIGHT: 293 LBS | DIASTOLIC BLOOD PRESSURE: 64 MMHG | RESPIRATION RATE: 20 BRPM | HEIGHT: 65 IN | BODY MASS INDEX: 48.82 KG/M2 | HEART RATE: 72 BPM | TEMPERATURE: 98.7 F | SYSTOLIC BLOOD PRESSURE: 149 MMHG | OXYGEN SATURATION: 99 %

## 2017-11-15 VITALS
HEART RATE: 92 BPM | OXYGEN SATURATION: 96 % | WEIGHT: 293 LBS | RESPIRATION RATE: 16 BRPM | HEIGHT: 65 IN | TEMPERATURE: 97.4 F | DIASTOLIC BLOOD PRESSURE: 66 MMHG | SYSTOLIC BLOOD PRESSURE: 156 MMHG | BODY MASS INDEX: 48.82 KG/M2

## 2017-11-15 DIAGNOSIS — D63.1 ANEMIA IN STAGE 3 CHRONIC KIDNEY DISEASE (HCC): ICD-10-CM

## 2017-11-15 DIAGNOSIS — N18.30 ANEMIA IN STAGE 3 CHRONIC KIDNEY DISEASE (HCC): Primary | ICD-10-CM

## 2017-11-15 DIAGNOSIS — D63.1 ANEMIA IN STAGE 3 CHRONIC KIDNEY DISEASE (HCC): Primary | ICD-10-CM

## 2017-11-15 DIAGNOSIS — N18.30 ANEMIA IN STAGE 3 CHRONIC KIDNEY DISEASE (HCC): ICD-10-CM

## 2017-11-15 LAB
ALBUMIN SERPL-MCNC: 3.7 G/DL (ref 3.5–5)
ALBUMIN/GLOB SERPL: 1.1 G/DL (ref 1.1–2.5)
ALP SERPL-CCNC: 77 U/L (ref 24–120)
ALT SERPL W P-5'-P-CCNC: 35 U/L (ref 0–54)
ANION GAP SERPL CALCULATED.3IONS-SCNC: 8 MMOL/L (ref 4–13)
AST SERPL-CCNC: 22 U/L (ref 7–45)
AUTO MIXED CELLS #: 0.4 10*3/MM3 (ref 0.1–2.6)
AUTO MIXED CELLS %: 3.8 % (ref 0.1–24)
BILIRUB SERPL-MCNC: 0.2 MG/DL (ref 0.1–1)
BUN BLD-MCNC: 40 MG/DL (ref 5–21)
BUN/CREAT SERPL: 19.4
CALCIUM SPEC-SCNC: 9.9 MG/DL (ref 8.4–10.4)
CHLORIDE SERPL-SCNC: 105 MMOL/L (ref 98–110)
CO2 SERPL-SCNC: 26 MMOL/L (ref 24–31)
CREAT BLD-MCNC: 2.06 MG/DL (ref 0.5–1.4)
ERYTHROCYTE [DISTWIDTH] IN BLOOD BY AUTOMATED COUNT: 16.1 % (ref 12–15)
GFR SERPL CREATININE-BSD FRML MDRD: 25 ML/MIN/1.73
GLOBULIN UR ELPH-MCNC: 3.5 GM/DL
GLUCOSE BLD-MCNC: 235 MG/DL (ref 70–100)
HCT VFR BLD AUTO: 31.3 % (ref 37–47)
HGB BLD-MCNC: 10.3 G/DL (ref 12–16)
HOLD SPECIMEN: NORMAL
LYMPHOCYTES # BLD AUTO: 1.8 10*3/MM3 (ref 0.8–7)
LYMPHOCYTES NFR BLD AUTO: 17.4 % (ref 15–45)
MCH RBC QN AUTO: 29.5 PG (ref 28–32)
MCHC RBC AUTO-ENTMCNC: 32.9 G/DL (ref 33–36)
MCV RBC AUTO: 89.7 FL (ref 82–98)
NEUTROPHILS # BLD AUTO: 8 10*3/MM3 (ref 1.5–8.3)
NEUTROPHILS NFR BLD AUTO: 78.8 % (ref 39–78)
PLATELET # BLD AUTO: 218 10*3/MM3 (ref 130–400)
PMV BLD AUTO: 8.8 FL (ref 6–12)
POTASSIUM BLD-SCNC: 4.5 MMOL/L (ref 3.5–5.3)
PROT SERPL-MCNC: 7.2 G/DL (ref 6.3–8.7)
RBC # BLD AUTO: 3.49 10*6/MM3 (ref 4.2–5.4)
SODIUM BLD-SCNC: 139 MMOL/L (ref 135–145)
WBC NRBC COR # BLD: 10.2 10*3/MM3 (ref 4.8–10.8)

## 2017-11-15 PROCEDURE — 36415 COLL VENOUS BLD VENIPUNCTURE: CPT

## 2017-11-15 PROCEDURE — 99214 OFFICE O/P EST MOD 30 MIN: CPT | Performed by: INTERNAL MEDICINE

## 2017-11-15 PROCEDURE — 85025 COMPLETE CBC W/AUTO DIFF WBC: CPT

## 2017-11-15 PROCEDURE — 63510000001 EPOETIN ALFA PER 1000 UNITS: Performed by: INTERNAL MEDICINE

## 2017-11-15 PROCEDURE — 96372 THER/PROPH/DIAG INJ SC/IM: CPT

## 2017-11-15 PROCEDURE — 80053 COMPREHEN METABOLIC PANEL: CPT

## 2017-11-15 RX ADMIN — ERYTHROPOIETIN 40000 UNITS: 40000 INJECTION, SOLUTION INTRAVENOUS; SUBCUTANEOUS at 11:50

## 2017-11-15 NOTE — PROGRESS NOTES
[facility]  HEMATOLOGY & ONCOLOGY        Subjective     VISIT DIAGNOSIS:   Encounter Diagnosis   Name Primary?   • Anemia in stage 3 chronic kidney disease        REASON FOR VISIT:     No chief complaint on file.       HEMATOLOGY / ONCOLOGY HISTORY:Ms. Shrestha is a 55 year old female with past medical history of hypertension, chronic kidney disease, diabetes insulindependent  type 2   No history exists.     [No treatment plan]  Cancer Staging Information:  No matching staging information was found for the patient.      INTERVAL HISTORY  Patient ID: Maria C Shrestha is a 57 y.o. year old female   Anemia secondary to chronic kidney disease, Stage III. Patient has not required VALERIE therapy this far.  2. Iron deficiency in the setting of chronic kidney disease, has benefitted from Feraheme. Had Feraheme in January, hemoglobin has  now near normalized at 10.7        Review of Systems   Constitutional: Negative.    HENT: Negative.    Eyes: Negative.    Respiratory: Negative.    Cardiovascular: Negative.    Gastrointestinal: Negative.    Endocrine: Negative.    Genitourinary: Negative.    Musculoskeletal: Negative.    Skin: Negative.    Allergic/Immunologic: Negative.    Neurological: Negative.    Hematological: Negative.    Psychiatric/Behavioral: Negative.             Medications:    Current Outpatient Prescriptions   Medication Sig Dispense Refill   • allopurinol (ZYLOPRIM) 100 MG tablet Take 100 mg by mouth 2 (Two) Times a Day.     • aspirin 81 MG tablet Take 81 mg by mouth Daily.     • B-D ULTRA-FINE 33 LANCETS misc Use 4 x daily 120 each 11   • busPIRone (BUSPAR) 10 MG tablet Take 10 mg by mouth 2 (Two) Times a Day.     • calcitriol (ROCALTROL) 0.25 MCG capsule Take 0.25 mcg by mouth 3 (Three) Times a Week.     • Calcium Carb-Cholecalciferol (CALCIUM-VITAMIN D3) 600-400 MG-UNIT tablet Take 1 tablet by mouth 2 (Two) Times a Day.     • carvedilol (COREG) 3.125 MG tablet Take 3.125 mg by mouth 2 (Two) Times  "a Day.     • cetirizine (zyrTEC) 10 MG tablet Take 10 mg by mouth As Needed.     • cholecalciferol (VITAMIN D3) 41096 UNITS capsule Take 50,000 Units by mouth Every 7 (Seven) Days.     • citalopram (CeleXA) 20 MG tablet Take 40 mg by mouth Daily.     • Dulaglutide (TRULICITY) 1.5 MG/0.5ML solution pen-injector Inject 1.5 mg under the skin Every 7 (Seven) Days. 12 pen 3   • furosemide (LASIX) 40 MG tablet Take 1 tablet by mouth Daily. (Patient taking differently: Take 40 mg by mouth As Needed.) 90 tablet 3   • Glucose Blood (BLOOD GLUCOSE TEST) strip Use 4 x daily, use any brand covered by insurance or same brand as before 120 each 11   • insulin glargine (LANTUS) 100 UNIT/ML injection 200 units daily 6 each 11   • insulin regular (HUMULIN R) 500 UNIT/ML CONCENTRATED injection Inject 10 Units under the skin 3 (Three) Times a Day Before Meals.     • Insulin Syringe 31G X 5/16\" 1 ML misc 4 x daily 120 each 11   • Insulin Syringe-Needle U-100 31G X 5/16\" 0.3 ML misc use as indicated 4 times daily. 120 each 11   • lamoTRIgine (LaMICtal) 50 MG tablet dispersible disintegrating tablet Take 50 mg by mouth Daily.     • levothyroxine (SYNTHROID) 50 MCG tablet Take 1 tablet by mouth Daily. 90 tablet 3   • lisinopril (PRINIVIL,ZESTRIL) 20 MG tablet Take 20 mg by mouth 2 (Two) Times a Day.     • Multiple Vitamins-Minerals (MULTIVITAMIN ADULT PO) Take 1 tablet by mouth Daily.     • potassium gluconate 595 MG tablet tablet Take 595 mg by mouth 2 (Two) Times a Day.     • rosuvastatin (CRESTOR) 10 MG tablet Take 10 mg by mouth Every Other Day.     • sodium bicarbonate 650 MG tablet Take 650 mg by mouth 3 (Three) Times a Day.       No current facility-administered medications for this visit.        ALLERGIES:    Allergies   Allergen Reactions   • Codeine Nausea Only       Objective      @VITALS    Current Status 9/21/2017   ECOG score 1       General Appearance: Patient is awake, alert, oriented and in no acute distress. Patient is " welldeveloped, wellnourished, and appears stated age.  HEENT: Normocephalic. Sclerae clear, conjunctiva pink, extraocular movements intact, pupils, round, reactive to light and  accommodation. Mouth and throat are clear with moist oral mucosa.  NECK: Supple, no jugular venous distention, thyroid not enlarged.  LYMPH: No cervical, supraclavicular, axillary, or inguinal lymphadenopathy.  CHEST: Equal bilateral expansion, AP  diameter normal, resonant percussion note  LUNGS: Good air movement, no rales, rhonchi, rubs or wheezes with auscultation  CARDIO: Regular sinus rhythm, no murmurs, gallops or rubs.  ABDOMEN: Nondistended, soft, No tenderness, no guarding, no rebound, No hepatosplenomegaly. No abdominal masses. Bowel sounds positive. No hernia  GENITALIA: Not examined.  BREASTS: Not examined.  MUSKEL: No joint swelling, decreased motion, or inflammation  EXTREMS: No edema, clubbing, cyanosis, No varicose veins.  NEURO: Grossly nonfocal, Gait is coordinated and smooth, Cognition is preserved.  SKIN: No rashes, no ecchymoses, no petechia.  PSYCH: Oriented to time, place and person. Memory is preserved. Mood and affect appear normal      RECENT LABS:  Lab on 11/15/2017   Component Date Value Ref Range Status   • WBC 11/15/2017 10.20  4.80 - 10.80 10*3/mm3 Final   • RBC 11/15/2017 3.49* 4.20 - 5.40 10*6/mm3 Final   • Hemoglobin 11/15/2017 10.3* 12.0 - 16.0 g/dL Final   • Hematocrit 11/15/2017 31.3* 37.0 - 47.0 % Final   • MCV 11/15/2017 89.7  82.0 - 98.0 fL Final   • MCH 11/15/2017 29.5  28.0 - 32.0 pg Final   • MCHC 11/15/2017 32.9* 33.0 - 36.0 g/dL Final   • RDW 11/15/2017 16.1* 12.0 - 15.0 % Final   • MPV 11/15/2017 8.8  6.0 - 12.0 fL Final   • Platelets 11/15/2017 218  130 - 400 10*3/mm3 Final   • Neutrophil % 11/15/2017 78.8* 39.0 - 78.0 % Final   • Lymphocyte % 11/15/2017 17.4  15.0 - 45.0 % Final   • Auto Mixed Cells % 11/15/2017 3.8  0.1 - 24.0 % Final   • Neutrophils, Absolute 11/15/2017 8.00  1.50 - 8.30  10*3/mm3 Final   • Lymphocytes, Absolute 11/15/2017 1.80  0.80 - 7.00 10*3/mm3 Final   • Auto Mixed Cells # 11/15/2017 0.40  0.10 - 2.60 10*3/mm3 Final       RADIOLOGY:  No results found.         Assessment/Plan        Anemia secondary to chronic kidney disease, Stage III. Patient has not required VALERIE therapy this far.  2. Iron deficiency in the setting of chronic kidney disease, has benefitted from Feraheme. Had Feraheme in January, hemoglobin has  now near normalized at 10.3.  Plan continue watchful waiting return to clinic in 3 months time and check another CBC and iron studies.  If the iron is still low I may give additional intravenous iron.  Her CMS guidelines I cannot give her Procrit unless hemoglobin is less than 10.    Clinically patient asymptomatic.  I will redraw her iron studies today.  Hemoglobin is a 10.  Return to clinic in 3 months time.            Marco A Melo MD    11/15/2017    10:36 AM             [facility]  HEMATOLOGY & ONCOLOGY        Subjective     VISIT DIAGNOSIS:   Encounter Diagnosis   Name Primary?   • Anemia in stage 3 chronic kidney disease        REASON FOR VISIT:     No chief complaint on file.       HEMATOLOGY / ONCOLOGY HISTORY:Ms. Shrestha is a 55 year old female with past medical history of hypertension, chronic kidney disease, diabetes insulindependent  type 2   No history exists.     [No treatment plan]  Cancer Staging Information:  No matching staging information was found for the patient.      INTERVAL HISTORY  Patient ID: Maria C Shrestha is a 57 y.o. year old female   Anemia secondary to chronic kidney disease, Stage III. Patient has not required VALERIE therapy this far.  2. Iron deficiency in the setting of chronic kidney disease, has benefitted from Feraheme. Had Feraheme in January, hemoglobin has  now near normalized at 10.7        Review of Systems   Constitutional: Negative.    HENT: Negative.    Eyes: Negative.    Respiratory: Negative.    Cardiovascular:  "Negative.    Gastrointestinal: Negative.    Endocrine: Negative.    Genitourinary: Negative.    Musculoskeletal: Negative.    Skin: Negative.    Allergic/Immunologic: Negative.    Neurological: Negative.    Hematological: Negative.    Psychiatric/Behavioral: Negative.             Medications:    Current Outpatient Prescriptions   Medication Sig Dispense Refill   • allopurinol (ZYLOPRIM) 100 MG tablet Take 100 mg by mouth 2 (Two) Times a Day.     • aspirin 81 MG tablet Take 81 mg by mouth Daily.     • B-D ULTRA-FINE 33 LANCETS misc Use 4 x daily 120 each 11   • busPIRone (BUSPAR) 10 MG tablet Take 10 mg by mouth 2 (Two) Times a Day.     • calcitriol (ROCALTROL) 0.25 MCG capsule Take 0.25 mcg by mouth 3 (Three) Times a Week.     • Calcium Carb-Cholecalciferol (CALCIUM-VITAMIN D3) 600-400 MG-UNIT tablet Take 1 tablet by mouth 2 (Two) Times a Day.     • carvedilol (COREG) 3.125 MG tablet Take 3.125 mg by mouth 2 (Two) Times a Day.     • cetirizine (zyrTEC) 10 MG tablet Take 10 mg by mouth As Needed.     • cholecalciferol (VITAMIN D3) 92459 UNITS capsule Take 50,000 Units by mouth Every 7 (Seven) Days.     • citalopram (CeleXA) 20 MG tablet Take 40 mg by mouth Daily.     • Dulaglutide (TRULICITY) 1.5 MG/0.5ML solution pen-injector Inject 1.5 mg under the skin Every 7 (Seven) Days. 12 pen 3   • furosemide (LASIX) 40 MG tablet Take 1 tablet by mouth Daily. (Patient taking differently: Take 40 mg by mouth As Needed.) 90 tablet 3   • Glucose Blood (BLOOD GLUCOSE TEST) strip Use 4 x daily, use any brand covered by insurance or same brand as before 120 each 11   • insulin glargine (LANTUS) 100 UNIT/ML injection 200 units daily 6 each 11   • insulin regular (HUMULIN R) 500 UNIT/ML CONCENTRATED injection Inject 10 Units under the skin 3 (Three) Times a Day Before Meals.     • Insulin Syringe 31G X 5/16\" 1 ML misc 4 x daily 120 each 11   • Insulin Syringe-Needle U-100 31G X 5/16\" 0.3 ML misc use as indicated 4 times daily. 120 " each 11   • lamoTRIgine (LaMICtal) 50 MG tablet dispersible disintegrating tablet Take 50 mg by mouth Daily.     • levothyroxine (SYNTHROID) 50 MCG tablet Take 1 tablet by mouth Daily. 90 tablet 3   • lisinopril (PRINIVIL,ZESTRIL) 20 MG tablet Take 20 mg by mouth 2 (Two) Times a Day.     • Multiple Vitamins-Minerals (MULTIVITAMIN ADULT PO) Take 1 tablet by mouth Daily.     • potassium gluconate 595 MG tablet tablet Take 595 mg by mouth 2 (Two) Times a Day.     • rosuvastatin (CRESTOR) 10 MG tablet Take 10 mg by mouth Every Other Day.     • sodium bicarbonate 650 MG tablet Take 650 mg by mouth 3 (Three) Times a Day.       No current facility-administered medications for this visit.        ALLERGIES:    Allergies   Allergen Reactions   • Codeine Nausea Only       Objective      @VITALS    Current Status 9/21/2017   ECOG score 1       General Appearance: Patient is awake, alert, oriented and in no acute distress. Patient is welldeveloped, wellnourished, and appears stated age.  HEENT: Normocephalic. Sclerae clear, conjunctiva pink, extraocular movements intact, pupils, round, reactive to light and  accommodation. Mouth and throat are clear with moist oral mucosa.  NECK: Supple, no jugular venous distention, thyroid not enlarged.  LYMPH: No cervical, supraclavicular, axillary, or inguinal lymphadenopathy.  CHEST: Equal bilateral expansion, AP  diameter normal, resonant percussion note  LUNGS: Good air movement, no rales, rhonchi, rubs or wheezes with auscultation  CARDIO: Regular sinus rhythm, no murmurs, gallops or rubs.  ABDOMEN: Nondistended, soft, No tenderness, no guarding, no rebound, No hepatosplenomegaly. No abdominal masses. Bowel sounds positive. No hernia  GENITALIA: Not examined.  BREASTS: Not examined.  MUSKEL: No joint swelling, decreased motion, or inflammation  EXTREMS: No edema, clubbing, cyanosis, No varicose veins.  NEURO: Grossly nonfocal, Gait is coordinated and smooth, Cognition is  preserved.  SKIN: No rashes, no ecchymoses, no petechia.  PSYCH: Oriented to time, place and person. Memory is preserved. Mood and affect appear normal      RECENT LABS:  Lab on 11/15/2017   Component Date Value Ref Range Status   • WBC 11/15/2017 10.20  4.80 - 10.80 10*3/mm3 Final   • RBC 11/15/2017 3.49* 4.20 - 5.40 10*6/mm3 Final   • Hemoglobin 11/15/2017 10.3* 12.0 - 16.0 g/dL Final   • Hematocrit 11/15/2017 31.3* 37.0 - 47.0 % Final   • MCV 11/15/2017 89.7  82.0 - 98.0 fL Final   • MCH 11/15/2017 29.5  28.0 - 32.0 pg Final   • MCHC 11/15/2017 32.9* 33.0 - 36.0 g/dL Final   • RDW 11/15/2017 16.1* 12.0 - 15.0 % Final   • MPV 11/15/2017 8.8  6.0 - 12.0 fL Final   • Platelets 11/15/2017 218  130 - 400 10*3/mm3 Final   • Neutrophil % 11/15/2017 78.8* 39.0 - 78.0 % Final   • Lymphocyte % 11/15/2017 17.4  15.0 - 45.0 % Final   • Auto Mixed Cells % 11/15/2017 3.8  0.1 - 24.0 % Final   • Neutrophils, Absolute 11/15/2017 8.00  1.50 - 8.30 10*3/mm3 Final   • Lymphocytes, Absolute 11/15/2017 1.80  0.80 - 7.00 10*3/mm3 Final   • Auto Mixed Cells # 11/15/2017 0.40  0.10 - 2.60 10*3/mm3 Final       RADIOLOGY:  No results found.         Assessment/Plan        Anemia secondary to chronic kidney disease, Stage III. Patient has not required VALERIE therapy this far.  2. Iron deficiency in the setting of chronic kidney disease, has benefitted from Feraheme. Had Feraheme in January, hemoglobin has  now near normalized at 10.3.  Plan continue watchful waiting return to clinic in 3 months time and check another CBC and iron studies.  If the iron is still low I may give additional intravenous iron.  Her CMS guidelines I cannot give her Procrit unless hemoglobin is less than 10.    Clinically patient asymptomatic.  I will redraw her iron studies today.  Hemoglobin is a 10.3gm Today. Per CMS guidelines Rx Procrit 40,000 u s/q every month.       Marco A Melo MD    11/15/2017    10:36 AM

## 2017-12-08 DIAGNOSIS — N18.30 ANEMIA IN STAGE 3 CHRONIC KIDNEY DISEASE (HCC): ICD-10-CM

## 2017-12-08 DIAGNOSIS — D63.1 ANEMIA IN STAGE 3 CHRONIC KIDNEY DISEASE (HCC): ICD-10-CM

## 2017-12-13 ENCOUNTER — INFUSION (OUTPATIENT)
Dept: ONCOLOGY | Facility: HOSPITAL | Age: 57
End: 2017-12-13
Attending: INTERNAL MEDICINE

## 2017-12-13 ENCOUNTER — LAB (OUTPATIENT)
Dept: LAB | Facility: HOSPITAL | Age: 57
End: 2017-12-13

## 2017-12-13 ENCOUNTER — OFFICE VISIT (OUTPATIENT)
Dept: ONCOLOGY | Facility: CLINIC | Age: 57
End: 2017-12-13

## 2017-12-13 VITALS
TEMPERATURE: 97.3 F | RESPIRATION RATE: 18 BRPM | HEART RATE: 66 BPM | HEIGHT: 65 IN | DIASTOLIC BLOOD PRESSURE: 64 MMHG | OXYGEN SATURATION: 100 % | WEIGHT: 293 LBS | SYSTOLIC BLOOD PRESSURE: 180 MMHG | BODY MASS INDEX: 48.82 KG/M2

## 2017-12-13 VITALS
RESPIRATION RATE: 16 BRPM | WEIGHT: 293 LBS | SYSTOLIC BLOOD PRESSURE: 138 MMHG | TEMPERATURE: 97.9 F | DIASTOLIC BLOOD PRESSURE: 76 MMHG | OXYGEN SATURATION: 94 % | BODY MASS INDEX: 48.82 KG/M2 | HEART RATE: 88 BPM | HEIGHT: 65 IN

## 2017-12-13 DIAGNOSIS — D63.1 ANEMIA IN STAGE 3 CHRONIC KIDNEY DISEASE (HCC): Primary | ICD-10-CM

## 2017-12-13 DIAGNOSIS — D63.1 ANEMIA IN STAGE 3 CHRONIC KIDNEY DISEASE (HCC): ICD-10-CM

## 2017-12-13 DIAGNOSIS — N18.30 ANEMIA IN STAGE 3 CHRONIC KIDNEY DISEASE (HCC): ICD-10-CM

## 2017-12-13 DIAGNOSIS — N18.30 ANEMIA IN STAGE 3 CHRONIC KIDNEY DISEASE (HCC): Primary | ICD-10-CM

## 2017-12-13 LAB
ALBUMIN SERPL-MCNC: 3.7 G/DL (ref 3.5–5)
ALBUMIN/GLOB SERPL: 1.1 G/DL (ref 1.1–2.5)
ALP SERPL-CCNC: 67 U/L (ref 24–120)
ALT SERPL W P-5'-P-CCNC: 34 U/L (ref 0–54)
ANION GAP SERPL CALCULATED.3IONS-SCNC: 8 MMOL/L (ref 4–13)
AST SERPL-CCNC: 24 U/L (ref 7–45)
AUTO MIXED CELLS #: 0.7 10*3/MM3 (ref 0.1–2.6)
AUTO MIXED CELLS %: 6.4 % (ref 0.1–24)
BILIRUB SERPL-MCNC: 0.2 MG/DL (ref 0.1–1)
BUN BLD-MCNC: 35 MG/DL (ref 5–21)
BUN/CREAT SERPL: 18.2
CALCIUM SPEC-SCNC: 10 MG/DL (ref 8.4–10.4)
CHLORIDE SERPL-SCNC: 106 MMOL/L (ref 98–110)
CO2 SERPL-SCNC: 25 MMOL/L (ref 24–31)
CREAT BLD-MCNC: 1.92 MG/DL (ref 0.5–1.4)
ERYTHROCYTE [DISTWIDTH] IN BLOOD BY AUTOMATED COUNT: 16.3 % (ref 12–15)
FERRITIN SERPL-MCNC: 106 NG/ML (ref 11.1–264)
GFR SERPL CREATININE-BSD FRML MDRD: 27 ML/MIN/1.73
GLOBULIN UR ELPH-MCNC: 3.5 GM/DL
GLUCOSE BLD-MCNC: 143 MG/DL (ref 70–100)
HCT VFR BLD AUTO: 31.5 % (ref 37–47)
HGB BLD-MCNC: 10.3 G/DL (ref 12–16)
IRON 24H UR-MRATE: 53 MCG/DL (ref 42–180)
IRON SATN MFR SERPL: 20 % (ref 20–45)
LYMPHOCYTES # BLD AUTO: 2.1 10*3/MM3 (ref 0.8–7)
LYMPHOCYTES NFR BLD AUTO: 20.2 % (ref 15–45)
MCH RBC QN AUTO: 29.3 PG (ref 28–32)
MCHC RBC AUTO-ENTMCNC: 32.7 G/DL (ref 33–36)
MCV RBC AUTO: 89.7 FL (ref 82–98)
NEUTROPHILS # BLD AUTO: 7.5 10*3/MM3 (ref 1.5–8.3)
NEUTROPHILS NFR BLD AUTO: 73.4 % (ref 39–78)
PLATELET # BLD AUTO: 216 10*3/MM3 (ref 130–400)
PMV BLD AUTO: 8.8 FL (ref 6–12)
POTASSIUM BLD-SCNC: 4.5 MMOL/L (ref 3.5–5.3)
PROT SERPL-MCNC: 7.2 G/DL (ref 6.3–8.7)
RBC # BLD AUTO: 3.51 10*6/MM3 (ref 4.2–5.4)
SODIUM BLD-SCNC: 139 MMOL/L (ref 135–145)
TIBC SERPL-MCNC: 269 MCG/DL (ref 225–420)
WBC NRBC COR # BLD: 10.3 10*3/MM3 (ref 4.8–10.8)

## 2017-12-13 PROCEDURE — 63510000001 EPOETIN ALFA PER 1000 UNITS: Performed by: INTERNAL MEDICINE

## 2017-12-13 PROCEDURE — 85025 COMPLETE CBC W/AUTO DIFF WBC: CPT

## 2017-12-13 PROCEDURE — 96372 THER/PROPH/DIAG INJ SC/IM: CPT

## 2017-12-13 PROCEDURE — 80053 COMPREHEN METABOLIC PANEL: CPT

## 2017-12-13 PROCEDURE — 36415 COLL VENOUS BLD VENIPUNCTURE: CPT

## 2017-12-13 PROCEDURE — 99214 OFFICE O/P EST MOD 30 MIN: CPT | Performed by: INTERNAL MEDICINE

## 2017-12-13 PROCEDURE — 83550 IRON BINDING TEST: CPT | Performed by: INTERNAL MEDICINE

## 2017-12-13 PROCEDURE — 82728 ASSAY OF FERRITIN: CPT | Performed by: INTERNAL MEDICINE

## 2017-12-13 PROCEDURE — 83540 ASSAY OF IRON: CPT | Performed by: INTERNAL MEDICINE

## 2017-12-13 RX ADMIN — ERYTHROPOIETIN 40000 UNITS: 40000 INJECTION, SOLUTION INTRAVENOUS; SUBCUTANEOUS at 14:14

## 2017-12-13 NOTE — PROGRESS NOTES
[facility]  HEMATOLOGY & ONCOLOGY        Subjective     VISIT DIAGNOSIS:   Encounter Diagnosis   Name Primary?   • Anemia in stage 3 chronic kidney disease        REASON FOR VISIT:     No chief complaint on file.       HEMATOLOGY / ONCOLOGY HISTORY:Ms. Shrestha is a 55 year old female with past medical history of hypertension, chronic kidney disease, diabetes insulindependent  type 2   No history exists.     [No treatment plan]  Cancer Staging Information:  No matching staging information was found for the patient.      INTERVAL HISTORY  Patient ID: Maria C Shrestha is a 57 y.o. year old female   Anemia secondary to chronic kidney disease, Stage III. Patient has not required VALERIE therapy this far.  2. Iron deficiency in the setting of chronic kidney disease, has benefitted from Feraheme. Had Feraheme in January, hemoglobin has  now near normalized at 10.7        Review of Systems   Constitutional: Negative.    HENT: Negative.    Eyes: Negative.    Respiratory: Negative.    Cardiovascular: Negative.    Gastrointestinal: Negative.    Endocrine: Negative.    Genitourinary: Negative.    Musculoskeletal: Negative.    Skin: Negative.    Allergic/Immunologic: Negative.    Neurological: Negative.    Hematological: Negative.    Psychiatric/Behavioral: Negative.             Medications:    Current Outpatient Prescriptions   Medication Sig Dispense Refill   • allopurinol (ZYLOPRIM) 100 MG tablet Take 100 mg by mouth 2 (Two) Times a Day.     • aspirin 81 MG tablet Take 81 mg by mouth Daily.     • B-D ULTRA-FINE 33 LANCETS misc Use 4 x daily 120 each 11   • busPIRone (BUSPAR) 10 MG tablet Take 10 mg by mouth 2 (Two) Times a Day.     • calcitriol (ROCALTROL) 0.25 MCG capsule Take 0.25 mcg by mouth 3 (Three) Times a Week.     • Calcium Carb-Cholecalciferol (CALCIUM-VITAMIN D3) 600-400 MG-UNIT tablet Take 1 tablet by mouth 2 (Two) Times a Day.     • carvedilol (COREG) 3.125 MG tablet Take 3.125 mg by mouth 2 (Two) Times  "a Day.     • cetirizine (zyrTEC) 10 MG tablet Take 10 mg by mouth As Needed.     • cholecalciferol (VITAMIN D3) 86531 UNITS capsule Take 50,000 Units by mouth Every 7 (Seven) Days.     • citalopram (CeleXA) 20 MG tablet Take 40 mg by mouth Daily.     • Dulaglutide (TRULICITY) 1.5 MG/0.5ML solution pen-injector Inject 1.5 mg under the skin Every 7 (Seven) Days. 12 pen 3   • furosemide (LASIX) 40 MG tablet Take 1 tablet by mouth Daily. (Patient taking differently: Take 40 mg by mouth As Needed.) 90 tablet 3   • Glucose Blood (BLOOD GLUCOSE TEST) strip Use 4 x daily, use any brand covered by insurance or same brand as before 120 each 11   • insulin glargine (LANTUS) 100 UNIT/ML injection 200 units daily 6 each 11   • insulin regular (HUMULIN R) 500 UNIT/ML CONCENTRATED injection Inject 10 Units under the skin 3 (Three) Times a Day Before Meals.     • Insulin Syringe 31G X 5/16\" 1 ML misc 4 x daily 120 each 11   • Insulin Syringe-Needle U-100 31G X 5/16\" 0.3 ML misc use as indicated 4 times daily. 120 each 11   • lamoTRIgine (LaMICtal) 50 MG tablet dispersible disintegrating tablet Take 50 mg by mouth Daily.     • levothyroxine (SYNTHROID) 50 MCG tablet Take 1 tablet by mouth Daily. 90 tablet 3   • lisinopril (PRINIVIL,ZESTRIL) 20 MG tablet Take 20 mg by mouth 2 (Two) Times a Day.     • Multiple Vitamins-Minerals (MULTIVITAMIN ADULT PO) Take 1 tablet by mouth Daily.     • potassium gluconate 595 MG tablet tablet Take 595 mg by mouth 2 (Two) Times a Day.     • rosuvastatin (CRESTOR) 10 MG tablet Take 10 mg by mouth Every Other Day.     • sodium bicarbonate 650 MG tablet Take 650 mg by mouth 3 (Three) Times a Day.       No current facility-administered medications for this visit.        ALLERGIES:    Allergies   Allergen Reactions   • Codeine Nausea Only       Objective      @VITALS    Current Status 12/13/2017   ECOG score 1       General Appearance: Patient is awake, alert, oriented and in no acute distress. Patient is " welldeveloped, wellnourished, and appears stated age.  HEENT: Normocephalic. Sclerae clear, conjunctiva pink, extraocular movements intact, pupils, round, reactive to light and  accommodation. Mouth and throat are clear with moist oral mucosa.  NECK: Supple, no jugular venous distention, thyroid not enlarged.  LYMPH: No cervical, supraclavicular, axillary, or inguinal lymphadenopathy.  CHEST: Equal bilateral expansion, AP  diameter normal, resonant percussion note  LUNGS: Good air movement, no rales, rhonchi, rubs or wheezes with auscultation  CARDIO: Regular sinus rhythm, no murmurs, gallops or rubs.  ABDOMEN: Nondistended, soft, No tenderness, no guarding, no rebound, No hepatosplenomegaly. No abdominal masses. Bowel sounds positive. No hernia  GENITALIA: Not examined.  BREASTS: Not examined.  MUSKEL: No joint swelling, decreased motion, or inflammation  EXTREMS: No edema, clubbing, cyanosis, No varicose veins.  NEURO: Grossly nonfocal, Gait is coordinated and smooth, Cognition is preserved.  SKIN: No rashes, no ecchymoses, no petechia.  PSYCH: Oriented to time, place and person. Memory is preserved. Mood and affect appear normal      RECENT LABS:  Lab on 12/13/2017   Component Date Value Ref Range Status   • Glucose 12/13/2017 143* 70 - 100 mg/dL Final   • BUN 12/13/2017 35* 5 - 21 mg/dL Final   • Creatinine 12/13/2017 1.92* 0.50 - 1.40 mg/dL Final   • Sodium 12/13/2017 139  135 - 145 mmol/L Final   • Potassium 12/13/2017 4.5  3.5 - 5.3 mmol/L Final   • Chloride 12/13/2017 106  98 - 110 mmol/L Final   • CO2 12/13/2017 25.0  24.0 - 31.0 mmol/L Final   • Calcium 12/13/2017 10.0  8.4 - 10.4 mg/dL Final   • Total Protein 12/13/2017 7.2  6.3 - 8.7 g/dL Final   • Albumin 12/13/2017 3.70  3.50 - 5.00 g/dL Final   • ALT (SGPT) 12/13/2017 34  0 - 54 U/L Final   • AST (SGOT) 12/13/2017 24  7 - 45 U/L Final   • Alkaline Phosphatase 12/13/2017 67  24 - 120 U/L Final   • Total Bilirubin 12/13/2017 0.2  0.1 - 1.0 mg/dL Final    • eGFR Non African Amer 12/13/2017 27* >60 mL/min/1.73 Final   • Globulin 12/13/2017 3.5  gm/dL Final   • A/G Ratio 12/13/2017 1.1  1.1 - 2.5 g/dL Final   • BUN/Creatinine Ratio 12/13/2017 18.2    Final   • Anion Gap 12/13/2017 8.0  4.0 - 13.0 mmol/L Final   • WBC 12/13/2017 10.30  4.80 - 10.80 10*3/mm3 Final   • RBC 12/13/2017 3.51* 4.20 - 5.40 10*6/mm3 Final   • Hemoglobin 12/13/2017 10.3* 12.0 - 16.0 g/dL Final   • Hematocrit 12/13/2017 31.5* 37.0 - 47.0 % Final   • MCV 12/13/2017 89.7  82.0 - 98.0 fL Final   • MCH 12/13/2017 29.3  28.0 - 32.0 pg Final   • MCHC 12/13/2017 32.7* 33.0 - 36.0 g/dL Final   • RDW 12/13/2017 16.3* 12.0 - 15.0 % Final   • MPV 12/13/2017 8.8  6.0 - 12.0 fL Final   • Platelets 12/13/2017 216  130 - 400 10*3/mm3 Final   • Neutrophil % 12/13/2017 73.4  39.0 - 78.0 % Final   • Lymphocyte % 12/13/2017 20.2  15.0 - 45.0 % Final   • Auto Mixed Cells % 12/13/2017 6.4  0.1 - 24.0 % Final   • Neutrophils, Absolute 12/13/2017 7.50  1.50 - 8.30 10*3/mm3 Final   • Lymphocytes, Absolute 12/13/2017 2.10  0.80 - 7.00 10*3/mm3 Final   • Auto Mixed Cells # 12/13/2017 0.70  0.10 - 2.60 10*3/mm3 Final       RADIOLOGY:  No results found.         Assessment/Plan        Anemia secondary to chronic kidney disease, Stage III. Patient has not required VALERIE therapy this far.  2. Iron deficiency in the setting of chronic kidney disease, has benefitted from Feraheme. Had Feraheme in January, hemoglobin has  now near normalized at 10.3.  Plan continue watchful waiting return to clinic in 3 months time and check another CBC and iron studies.  If the iron is still low I may give additional intravenous iron.  Her CMS guidelines I cannot give her Procrit unless hemoglobin is less than 10.    Clinically patient asymptomatic.  I will redraw her iron studies today.  Hemoglobin is a 10.  Return to clinic in 3 months time.            Marco A Melo MD    12/13/2017    1:33 PM             [facility]  GROUP  HEMATOLOGY & ONCOLOGY        Subjective     VISIT DIAGNOSIS:   Encounter Diagnosis   Name Primary?   • Anemia in stage 3 chronic kidney disease        REASON FOR VISIT:     No chief complaint on file.       HEMATOLOGY / ONCOLOGY HISTORY:Ms. Shrestha is a 55 year old female with past medical history of hypertension, chronic kidney disease, diabetes insulindependent  type 2   No history exists.     [No treatment plan]  Cancer Staging Information:  No matching staging information was found for the patient.      INTERVAL HISTORY  Patient ID: Maria C Shrestha is a 57 y.o. year old female   Anemia secondary to chronic kidney disease, Stage III. Patient has not required VALERIE therapy this far.  2. Iron deficiency in the setting of chronic kidney disease, has benefitted from Feraheme. Had Feraheme in January, hemoglobin has  now near normalized at 10.7        Review of Systems   Constitutional: Negative.    HENT: Negative.    Eyes: Negative.    Respiratory: Negative.    Cardiovascular: Negative.    Gastrointestinal: Negative.    Endocrine: Negative.    Genitourinary: Negative.    Musculoskeletal: Negative.    Skin: Negative.    Allergic/Immunologic: Negative.    Neurological: Negative.    Hematological: Negative.    Psychiatric/Behavioral: Negative.             Medications:    Current Outpatient Prescriptions   Medication Sig Dispense Refill   • allopurinol (ZYLOPRIM) 100 MG tablet Take 100 mg by mouth 2 (Two) Times a Day.     • aspirin 81 MG tablet Take 81 mg by mouth Daily.     • B-D ULTRA-FINE 33 LANCETS misc Use 4 x daily 120 each 11   • busPIRone (BUSPAR) 10 MG tablet Take 10 mg by mouth 2 (Two) Times a Day.     • calcitriol (ROCALTROL) 0.25 MCG capsule Take 0.25 mcg by mouth 3 (Three) Times a Week.     • Calcium Carb-Cholecalciferol (CALCIUM-VITAMIN D3) 600-400 MG-UNIT tablet Take 1 tablet by mouth 2 (Two) Times a Day.     • carvedilol (COREG) 3.125 MG tablet Take 3.125 mg by mouth 2 (Two) Times a Day.     • cetirizine  "(zyrTEC) 10 MG tablet Take 10 mg by mouth As Needed.     • cholecalciferol (VITAMIN D3) 66937 UNITS capsule Take 50,000 Units by mouth Every 7 (Seven) Days.     • citalopram (CeleXA) 20 MG tablet Take 40 mg by mouth Daily.     • Dulaglutide (TRULICITY) 1.5 MG/0.5ML solution pen-injector Inject 1.5 mg under the skin Every 7 (Seven) Days. 12 pen 3   • furosemide (LASIX) 40 MG tablet Take 1 tablet by mouth Daily. (Patient taking differently: Take 40 mg by mouth As Needed.) 90 tablet 3   • Glucose Blood (BLOOD GLUCOSE TEST) strip Use 4 x daily, use any brand covered by insurance or same brand as before 120 each 11   • insulin glargine (LANTUS) 100 UNIT/ML injection 200 units daily 6 each 11   • insulin regular (HUMULIN R) 500 UNIT/ML CONCENTRATED injection Inject 10 Units under the skin 3 (Three) Times a Day Before Meals.     • Insulin Syringe 31G X 5/16\" 1 ML misc 4 x daily 120 each 11   • Insulin Syringe-Needle U-100 31G X 5/16\" 0.3 ML misc use as indicated 4 times daily. 120 each 11   • lamoTRIgine (LaMICtal) 50 MG tablet dispersible disintegrating tablet Take 50 mg by mouth Daily.     • levothyroxine (SYNTHROID) 50 MCG tablet Take 1 tablet by mouth Daily. 90 tablet 3   • lisinopril (PRINIVIL,ZESTRIL) 20 MG tablet Take 20 mg by mouth 2 (Two) Times a Day.     • Multiple Vitamins-Minerals (MULTIVITAMIN ADULT PO) Take 1 tablet by mouth Daily.     • potassium gluconate 595 MG tablet tablet Take 595 mg by mouth 2 (Two) Times a Day.     • rosuvastatin (CRESTOR) 10 MG tablet Take 10 mg by mouth Every Other Day.     • sodium bicarbonate 650 MG tablet Take 650 mg by mouth 3 (Three) Times a Day.       No current facility-administered medications for this visit.        ALLERGIES:    Allergies   Allergen Reactions   • Codeine Nausea Only       Objective      @VITALS    Current Status 12/13/2017   ECOG score 1       General Appearance: Patient is awake, alert, oriented and in no acute distress. Patient is welldeveloped, " wellnourished, and appears stated age.  HEENT: Normocephalic. Sclerae clear, conjunctiva pink, extraocular movements intact, pupils, round, reactive to light and  accommodation. Mouth and throat are clear with moist oral mucosa.  NECK: Supple, no jugular venous distention, thyroid not enlarged.  LYMPH: No cervical, supraclavicular, axillary, or inguinal lymphadenopathy.  CHEST: Equal bilateral expansion, AP  diameter normal, resonant percussion note  LUNGS: Good air movement, no rales, rhonchi, rubs or wheezes with auscultation  CARDIO: Regular sinus rhythm, no murmurs, gallops or rubs.  ABDOMEN: Nondistended, soft, No tenderness, no guarding, no rebound, No hepatosplenomegaly. No abdominal masses. Bowel sounds positive. No hernia  GENITALIA: Not examined.  BREASTS: Not examined.  MUSKEL: No joint swelling, decreased motion, or inflammation  EXTREMS: No edema, clubbing, cyanosis, No varicose veins.  NEURO: Grossly nonfocal, Gait is coordinated and smooth, Cognition is preserved.  SKIN: No rashes, no ecchymoses, no petechia.  PSYCH: Oriented to time, place and person. Memory is preserved. Mood and affect appear normal      RECENT LABS:  Lab on 12/13/2017   Component Date Value Ref Range Status   • Glucose 12/13/2017 143* 70 - 100 mg/dL Final   • BUN 12/13/2017 35* 5 - 21 mg/dL Final   • Creatinine 12/13/2017 1.92* 0.50 - 1.40 mg/dL Final   • Sodium 12/13/2017 139  135 - 145 mmol/L Final   • Potassium 12/13/2017 4.5  3.5 - 5.3 mmol/L Final   • Chloride 12/13/2017 106  98 - 110 mmol/L Final   • CO2 12/13/2017 25.0  24.0 - 31.0 mmol/L Final   • Calcium 12/13/2017 10.0  8.4 - 10.4 mg/dL Final   • Total Protein 12/13/2017 7.2  6.3 - 8.7 g/dL Final   • Albumin 12/13/2017 3.70  3.50 - 5.00 g/dL Final   • ALT (SGPT) 12/13/2017 34  0 - 54 U/L Final   • AST (SGOT) 12/13/2017 24  7 - 45 U/L Final   • Alkaline Phosphatase 12/13/2017 67  24 - 120 U/L Final   • Total Bilirubin 12/13/2017 0.2  0.1 - 1.0 mg/dL Final   • eGFR Non   Amer 12/13/2017 27* >60 mL/min/1.73 Final   • Globulin 12/13/2017 3.5  gm/dL Final   • A/G Ratio 12/13/2017 1.1  1.1 - 2.5 g/dL Final   • BUN/Creatinine Ratio 12/13/2017 18.2    Final   • Anion Gap 12/13/2017 8.0  4.0 - 13.0 mmol/L Final   • WBC 12/13/2017 10.30  4.80 - 10.80 10*3/mm3 Final   • RBC 12/13/2017 3.51* 4.20 - 5.40 10*6/mm3 Final   • Hemoglobin 12/13/2017 10.3* 12.0 - 16.0 g/dL Final   • Hematocrit 12/13/2017 31.5* 37.0 - 47.0 % Final   • MCV 12/13/2017 89.7  82.0 - 98.0 fL Final   • MCH 12/13/2017 29.3  28.0 - 32.0 pg Final   • MCHC 12/13/2017 32.7* 33.0 - 36.0 g/dL Final   • RDW 12/13/2017 16.3* 12.0 - 15.0 % Final   • MPV 12/13/2017 8.8  6.0 - 12.0 fL Final   • Platelets 12/13/2017 216  130 - 400 10*3/mm3 Final   • Neutrophil % 12/13/2017 73.4  39.0 - 78.0 % Final   • Lymphocyte % 12/13/2017 20.2  15.0 - 45.0 % Final   • Auto Mixed Cells % 12/13/2017 6.4  0.1 - 24.0 % Final   • Neutrophils, Absolute 12/13/2017 7.50  1.50 - 8.30 10*3/mm3 Final   • Lymphocytes, Absolute 12/13/2017 2.10  0.80 - 7.00 10*3/mm3 Final   • Auto Mixed Cells # 12/13/2017 0.70  0.10 - 2.60 10*3/mm3 Final       RADIOLOGY:  No results found.         Assessment/Plan        Anemia secondary to chronic kidney disease, Stage III. Patient has not required VALERIE therapy this far.  2. Iron deficiency in the setting of chronic kidney disease, has benefitted from Feraheme. Had Feraheme in January, hemoglobin has  now near normalized at 10.3.  Plan continue watchful waiting return to clinic in 3 months time and check another CBC and iron studies.  If the iron is still low I may give additional intravenous iron.  Her CMS guidelines I cannot give her Procrit unless hemoglobin is less than 10.    Clinically patient asymptomatic.  I will redraw her iron studies today.  Hemoglobin is a 10.3gm Today. Per CMS guidelines Rx Procrit 40,000 u s/q every month.       Marco A Melo MD    12/13/2017    1:33 PM

## 2018-01-10 ENCOUNTER — OFFICE VISIT (OUTPATIENT)
Dept: ONCOLOGY | Facility: CLINIC | Age: 58
End: 2018-01-10

## 2018-01-10 ENCOUNTER — INFUSION (OUTPATIENT)
Dept: ONCOLOGY | Facility: HOSPITAL | Age: 58
End: 2018-01-10
Attending: INTERNAL MEDICINE

## 2018-01-10 ENCOUNTER — LAB (OUTPATIENT)
Dept: LAB | Facility: HOSPITAL | Age: 58
End: 2018-01-10
Attending: INTERNAL MEDICINE

## 2018-01-10 VITALS
BODY MASS INDEX: 48.82 KG/M2 | RESPIRATION RATE: 18 BRPM | WEIGHT: 293 LBS | SYSTOLIC BLOOD PRESSURE: 166 MMHG | HEART RATE: 96 BPM | OXYGEN SATURATION: 94 % | HEIGHT: 65 IN | TEMPERATURE: 97.6 F | DIASTOLIC BLOOD PRESSURE: 66 MMHG

## 2018-01-10 VITALS
HEART RATE: 73 BPM | WEIGHT: 293 LBS | DIASTOLIC BLOOD PRESSURE: 57 MMHG | OXYGEN SATURATION: 99 % | RESPIRATION RATE: 20 BRPM | TEMPERATURE: 98.1 F | SYSTOLIC BLOOD PRESSURE: 144 MMHG | HEIGHT: 65 IN | BODY MASS INDEX: 48.82 KG/M2

## 2018-01-10 DIAGNOSIS — D63.1 ANEMIA IN STAGE 3 CHRONIC KIDNEY DISEASE (HCC): ICD-10-CM

## 2018-01-10 DIAGNOSIS — D63.1 ANEMIA IN STAGE 3 CHRONIC KIDNEY DISEASE (HCC): Primary | ICD-10-CM

## 2018-01-10 DIAGNOSIS — N18.30 ANEMIA IN STAGE 3 CHRONIC KIDNEY DISEASE (HCC): ICD-10-CM

## 2018-01-10 DIAGNOSIS — N18.30 ANEMIA IN STAGE 3 CHRONIC KIDNEY DISEASE (HCC): Primary | ICD-10-CM

## 2018-01-10 LAB
ALBUMIN SERPL-MCNC: 3.6 G/DL (ref 3.5–5)
ALBUMIN/GLOB SERPL: 1.2 G/DL (ref 1.1–2.5)
ALP SERPL-CCNC: 68 U/L (ref 24–120)
ALT SERPL W P-5'-P-CCNC: 36 U/L (ref 0–54)
ANION GAP SERPL CALCULATED.3IONS-SCNC: 10 MMOL/L (ref 4–13)
AST SERPL-CCNC: 33 U/L (ref 7–45)
AUTO MIXED CELLS #: 0.4 10*3/MM3 (ref 0.1–2.6)
AUTO MIXED CELLS %: 3.1 % (ref 0.1–24)
BILIRUB SERPL-MCNC: 0.2 MG/DL (ref 0.1–1)
BUN BLD-MCNC: 31 MG/DL (ref 5–21)
BUN/CREAT SERPL: 17.2
CALCIUM SPEC-SCNC: 8.3 MG/DL (ref 8.4–10.4)
CHLORIDE SERPL-SCNC: 109 MMOL/L (ref 98–110)
CO2 SERPL-SCNC: 23 MMOL/L (ref 24–31)
CREAT BLD-MCNC: 1.8 MG/DL (ref 0.5–1.4)
ERYTHROCYTE [DISTWIDTH] IN BLOOD BY AUTOMATED COUNT: 17.8 % (ref 12–15)
GFR SERPL CREATININE-BSD FRML MDRD: 29 ML/MIN/1.73
GLOBULIN UR ELPH-MCNC: 3.1 GM/DL
GLUCOSE BLD-MCNC: 114 MG/DL (ref 70–100)
HCT VFR BLD AUTO: 30.5 % (ref 37–47)
HGB BLD-MCNC: 10.2 G/DL (ref 12–16)
HOLD SPECIMEN: NORMAL
LYMPHOCYTES # BLD AUTO: 2.5 10*3/MM3 (ref 0.8–7)
LYMPHOCYTES NFR BLD AUTO: 20.7 % (ref 15–45)
MCH RBC QN AUTO: 30.2 PG (ref 28–32)
MCHC RBC AUTO-ENTMCNC: 33.4 G/DL (ref 33–36)
MCV RBC AUTO: 90.2 FL (ref 82–98)
NEUTROPHILS # BLD AUTO: 9.2 10*3/MM3 (ref 1.5–8.3)
NEUTROPHILS NFR BLD AUTO: 76.2 % (ref 39–78)
PLATELET # BLD AUTO: 220 10*3/MM3 (ref 130–400)
PMV BLD AUTO: 9.3 FL (ref 6–12)
POTASSIUM BLD-SCNC: 4.2 MMOL/L (ref 3.5–5.3)
PROT SERPL-MCNC: 6.7 G/DL (ref 6.3–8.7)
RBC # BLD AUTO: 3.38 10*6/MM3 (ref 4.2–5.4)
SODIUM BLD-SCNC: 142 MMOL/L (ref 135–145)
WBC NRBC COR # BLD: 12.1 10*3/MM3 (ref 4.8–10.8)

## 2018-01-10 PROCEDURE — 99214 OFFICE O/P EST MOD 30 MIN: CPT | Performed by: INTERNAL MEDICINE

## 2018-01-10 PROCEDURE — 36415 COLL VENOUS BLD VENIPUNCTURE: CPT

## 2018-01-10 PROCEDURE — 96372 THER/PROPH/DIAG INJ SC/IM: CPT

## 2018-01-10 PROCEDURE — 85025 COMPLETE CBC W/AUTO DIFF WBC: CPT

## 2018-01-10 PROCEDURE — 63510000001 EPOETIN ALFA PER 1000 UNITS: Performed by: INTERNAL MEDICINE

## 2018-01-10 PROCEDURE — 80053 COMPREHEN METABOLIC PANEL: CPT

## 2018-01-10 RX ADMIN — ERYTHROPOIETIN 40000 UNITS: 40000 INJECTION, SOLUTION INTRAVENOUS; SUBCUTANEOUS at 14:29

## 2018-01-10 NOTE — PROGRESS NOTES
[facility]  HEMATOLOGY & ONCOLOGY        Subjective     VISIT DIAGNOSIS:   Encounter Diagnosis   Name Primary?   • Anemia in stage 3 chronic kidney disease        REASON FOR VISIT:     Chief Complaint   Patient presents with   • Follow-up     Anemia f/u: She is here for f/u visit today and to review her lab work        HEMATOLOGY / ONCOLOGY HISTORY:Ms. Shrestha is a 55 year old female with past medical history of hypertension, chronic kidney disease, diabetes insulindependent  type 2   No history exists.     [No treatment plan]  Cancer Staging Information:  No matching staging information was found for the patient.      INTERVAL HISTORY  Patient ID: Maria C Shrestha is a 57 y.o. year old female   Anemia secondary to chronic kidney disease, Stage III. Patient has not required VALERIE therapy this far.  2. Iron deficiency in the setting of chronic kidney disease, has benefitted from Feraheme. Had Feraheme in January, hemoglobin has  now near normalized at 10.7        Review of Systems   Constitutional: Negative.    HENT: Negative.    Eyes: Negative.    Respiratory: Negative.    Cardiovascular: Negative.    Gastrointestinal: Negative.    Endocrine: Negative.    Genitourinary: Negative.    Musculoskeletal: Negative.    Skin: Negative.    Allergic/Immunologic: Negative.    Neurological: Negative.    Hematological: Negative.    Psychiatric/Behavioral: Negative.             Medications:    Current Outpatient Prescriptions   Medication Sig Dispense Refill   • allopurinol (ZYLOPRIM) 100 MG tablet Take 100 mg by mouth 2 (Two) Times a Day.     • aspirin 81 MG tablet Take 81 mg by mouth Daily.     • B-D ULTRA-FINE 33 LANCETS misc Use 4 x daily 120 each 11   • busPIRone (BUSPAR) 10 MG tablet Take 10 mg by mouth 2 (Two) Times a Day.     • calcitriol (ROCALTROL) 0.25 MCG capsule Take 0.25 mcg by mouth 3 (Three) Times a Week.     • Calcium Carb-Cholecalciferol (CALCIUM-VITAMIN D3) 600-400 MG-UNIT tablet Take 1 tablet by  "mouth 2 (Two) Times a Day.     • cetirizine (zyrTEC) 10 MG tablet Take 10 mg by mouth As Needed.     • cholecalciferol (VITAMIN D3) 85315 UNITS capsule Take 50,000 Units by mouth Every 7 (Seven) Days.     • citalopram (CeleXA) 20 MG tablet Take 40 mg by mouth Daily.     • Dulaglutide (TRULICITY) 1.5 MG/0.5ML solution pen-injector Inject 1.5 mg under the skin Every 7 (Seven) Days. 12 pen 3   • furosemide (LASIX) 40 MG tablet Take 1 tablet by mouth Daily. (Patient taking differently: Take 40 mg by mouth As Needed.) 90 tablet 3   • Glucose Blood (BLOOD GLUCOSE TEST) strip Use 4 x daily, use any brand covered by insurance or same brand as before 120 each 11   • insulin glargine (LANTUS) 100 UNIT/ML injection 200 units daily 6 each 11   • insulin regular (HUMULIN R) 500 UNIT/ML CONCENTRATED injection Inject 10 Units under the skin 3 (Three) Times a Day Before Meals.     • Insulin Syringe 31G X 5/16\" 1 ML misc 4 x daily 120 each 11   • Insulin Syringe-Needle U-100 31G X 5/16\" 0.3 ML misc use as indicated 4 times daily. 120 each 11   • lamoTRIgine (LaMICtal) 50 MG tablet dispersible disintegrating tablet Take 50 mg by mouth Daily.     • levothyroxine (SYNTHROID) 50 MCG tablet Take 1 tablet by mouth Daily. 90 tablet 3   • lisinopril (PRINIVIL,ZESTRIL) 20 MG tablet Take 20 mg by mouth 2 (Two) Times a Day.     • Multiple Vitamins-Minerals (MULTIVITAMIN ADULT PO) Take 1 tablet by mouth Daily.     • potassium gluconate 595 MG tablet tablet Take 595 mg by mouth 2 (Two) Times a Day.     • rosuvastatin (CRESTOR) 10 MG tablet Take 10 mg by mouth Every Other Day.     • sodium bicarbonate 650 MG tablet Take 650 mg by mouth 3 (Three) Times a Day.     • carvedilol (COREG) 3.125 MG tablet Take 3.125 mg by mouth 2 (Two) Times a Day.       No current facility-administered medications for this visit.        ALLERGIES:    Allergies   Allergen Reactions   • Codeine Nausea Only       Objective      @VITALS    Current Status 1/10/2018   ECOG " score 3       General Appearance: Patient is awake, alert, oriented and in no acute distress. Patient is welldeveloped, wellnourished, and appears stated age.  HEENT: Normocephalic. Sclerae clear, conjunctiva pink, extraocular movements intact, pupils, round, reactive to light and  accommodation. Mouth and throat are clear with moist oral mucosa.  NECK: Supple, no jugular venous distention, thyroid not enlarged.  LYMPH: No cervical, supraclavicular, axillary, or inguinal lymphadenopathy.  CHEST: Equal bilateral expansion, AP  diameter normal, resonant percussion note  LUNGS: Good air movement, no rales, rhonchi, rubs or wheezes with auscultation  CARDIO: Regular sinus rhythm, no murmurs, gallops or rubs.  ABDOMEN: Nondistended, soft, No tenderness, no guarding, no rebound, No hepatosplenomegaly. No abdominal masses. Bowel sounds positive. No hernia  GENITALIA: Not examined.  BREASTS: Not examined.  MUSKEL: No joint swelling, decreased motion, or inflammation  EXTREMS: No edema, clubbing, cyanosis, No varicose veins.  NEURO: Grossly nonfocal, Gait is coordinated and smooth, Cognition is preserved.  SKIN: No rashes, no ecchymoses, no petechia.  PSYCH: Oriented to time, place and person. Memory is preserved. Mood and affect appear normal      RECENT LABS:  Lab on 01/10/2018   Component Date Value Ref Range Status   • WBC 01/10/2018 12.10* 4.80 - 10.80 10*3/mm3 Final   • RBC 01/10/2018 3.38* 4.20 - 5.40 10*6/mm3 Final   • Hemoglobin 01/10/2018 10.2* 12.0 - 16.0 g/dL Final   • Hematocrit 01/10/2018 30.5* 37.0 - 47.0 % Final   • MCV 01/10/2018 90.2  82.0 - 98.0 fL Final   • MCH 01/10/2018 30.2  28.0 - 32.0 pg Final   • MCHC 01/10/2018 33.4  33.0 - 36.0 g/dL Final   • RDW 01/10/2018 17.8* 12.0 - 15.0 % Final   • MPV 01/10/2018 9.3  6.0 - 12.0 fL Final   • Platelets 01/10/2018 220  130 - 400 10*3/mm3 Final   • Neutrophil % 01/10/2018 76.2  39.0 - 78.0 % Final   • Lymphocyte % 01/10/2018 20.7  15.0 - 45.0 % Final   • Auto  Mixed Cells % 01/10/2018 3.1  0.1 - 24.0 % Final   • Neutrophils, Absolute 01/10/2018 9.20* 1.50 - 8.30 10*3/mm3 Final   • Lymphocytes, Absolute 01/10/2018 2.50  0.80 - 7.00 10*3/mm3 Final   • Auto Mixed Cells # 01/10/2018 0.40  0.10 - 2.60 10*3/mm3 Final       RADIOLOGY:  No results found.         Assessment/Plan        Anemia secondary to chronic kidney disease, Stage III. Patient has not required VALERIE therapy this far.  2. Iron deficiency in the setting of chronic kidney disease, has benefitted from Feraheme. Had Feraheme in January, hemoglobin has  now near normalized at 10.3.  Plan continue watchful waiting return to clinic in 3 months time and check another CBC and iron studies.  If the iron is still low I may give additional intravenous iron.  Her CMS guidelines I cannot give her Procrit unless hemoglobin is less than 10.    Clinically patient asymptomatic.  I will redraw her iron studies today.  Hemoglobin is a 10.  Return to clinic in 3 months time.            Marco A Melo MD    1/10/2018    1:41 PM             [facility]  HEMATOLOGY & ONCOLOGY        Subjective     VISIT DIAGNOSIS:   Encounter Diagnosis   Name Primary?   • Anemia in stage 3 chronic kidney disease        REASON FOR VISIT:     Chief Complaint   Patient presents with   • Follow-up     Anemia f/u: She is here for f/u visit today and to review her lab work        HEMATOLOGY / ONCOLOGY HISTORY:Ms. Shrestha is a 55 year old female with past medical history of hypertension, chronic kidney disease, diabetes insulindependent  type 2   No history exists.     [No treatment plan]  Cancer Staging Information:  No matching staging information was found for the patient.      INTERVAL HISTORY  Patient ID: Maria C Shrestha is a 57 y.o. year old female   Anemia secondary to chronic kidney disease, Stage III. Patient has not required VALERIE therapy this far.  2. Iron deficiency in the setting of chronic kidney disease, has benefitted from Feraheme.  "Had Feraheme in January, hemoglobin has  now near normalized at 10.7        Review of Systems   Constitutional: Negative.    HENT: Negative.    Eyes: Negative.    Respiratory: Negative.    Cardiovascular: Negative.    Gastrointestinal: Negative.    Endocrine: Negative.    Genitourinary: Negative.    Musculoskeletal: Negative.    Skin: Negative.    Allergic/Immunologic: Negative.    Neurological: Negative.    Hematological: Negative.    Psychiatric/Behavioral: Negative.             Medications:    Current Outpatient Prescriptions   Medication Sig Dispense Refill   • allopurinol (ZYLOPRIM) 100 MG tablet Take 100 mg by mouth 2 (Two) Times a Day.     • aspirin 81 MG tablet Take 81 mg by mouth Daily.     • B-D ULTRA-FINE 33 LANCETS misc Use 4 x daily 120 each 11   • busPIRone (BUSPAR) 10 MG tablet Take 10 mg by mouth 2 (Two) Times a Day.     • calcitriol (ROCALTROL) 0.25 MCG capsule Take 0.25 mcg by mouth 3 (Three) Times a Week.     • Calcium Carb-Cholecalciferol (CALCIUM-VITAMIN D3) 600-400 MG-UNIT tablet Take 1 tablet by mouth 2 (Two) Times a Day.     • cetirizine (zyrTEC) 10 MG tablet Take 10 mg by mouth As Needed.     • cholecalciferol (VITAMIN D3) 81674 UNITS capsule Take 50,000 Units by mouth Every 7 (Seven) Days.     • citalopram (CeleXA) 20 MG tablet Take 40 mg by mouth Daily.     • Dulaglutide (TRULICITY) 1.5 MG/0.5ML solution pen-injector Inject 1.5 mg under the skin Every 7 (Seven) Days. 12 pen 3   • furosemide (LASIX) 40 MG tablet Take 1 tablet by mouth Daily. (Patient taking differently: Take 40 mg by mouth As Needed.) 90 tablet 3   • Glucose Blood (BLOOD GLUCOSE TEST) strip Use 4 x daily, use any brand covered by insurance or same brand as before 120 each 11   • insulin glargine (LANTUS) 100 UNIT/ML injection 200 units daily 6 each 11   • insulin regular (HUMULIN R) 500 UNIT/ML CONCENTRATED injection Inject 10 Units under the skin 3 (Three) Times a Day Before Meals.     • Insulin Syringe 31G X 5/16\" 1 ML " "misc 4 x daily 120 each 11   • Insulin Syringe-Needle U-100 31G X 5/16\" 0.3 ML misc use as indicated 4 times daily. 120 each 11   • lamoTRIgine (LaMICtal) 50 MG tablet dispersible disintegrating tablet Take 50 mg by mouth Daily.     • levothyroxine (SYNTHROID) 50 MCG tablet Take 1 tablet by mouth Daily. 90 tablet 3   • lisinopril (PRINIVIL,ZESTRIL) 20 MG tablet Take 20 mg by mouth 2 (Two) Times a Day.     • Multiple Vitamins-Minerals (MULTIVITAMIN ADULT PO) Take 1 tablet by mouth Daily.     • potassium gluconate 595 MG tablet tablet Take 595 mg by mouth 2 (Two) Times a Day.     • rosuvastatin (CRESTOR) 10 MG tablet Take 10 mg by mouth Every Other Day.     • sodium bicarbonate 650 MG tablet Take 650 mg by mouth 3 (Three) Times a Day.     • carvedilol (COREG) 3.125 MG tablet Take 3.125 mg by mouth 2 (Two) Times a Day.       No current facility-administered medications for this visit.        ALLERGIES:    Allergies   Allergen Reactions   • Codeine Nausea Only       Objective      @VITALS    Current Status 1/10/2018   ECOG score 3       General Appearance: Patient is awake, alert, oriented and in no acute distress. Patient is welldeveloped, wellnourished, and appears stated age.  HEENT: Normocephalic. Sclerae clear, conjunctiva pink, extraocular movements intact, pupils, round, reactive to light and  accommodation. Mouth and throat are clear with moist oral mucosa.  NECK: Supple, no jugular venous distention, thyroid not enlarged.  LYMPH: No cervical, supraclavicular, axillary, or inguinal lymphadenopathy.  CHEST: Equal bilateral expansion, AP  diameter normal, resonant percussion note  LUNGS: Good air movement, no rales, rhonchi, rubs or wheezes with auscultation  CARDIO: Regular sinus rhythm, no murmurs, gallops or rubs.  ABDOMEN: Nondistended, soft, No tenderness, no guarding, no rebound, No hepatosplenomegaly. No abdominal masses. Bowel sounds positive. No hernia  GENITALIA: Not examined.  BREASTS: Not " examined.  MUSKEL: No joint swelling, decreased motion, or inflammation  EXTREMS: No edema, clubbing, cyanosis, No varicose veins.  NEURO: Grossly nonfocal, Gait is coordinated and smooth, Cognition is preserved.  SKIN: No rashes, no ecchymoses, no petechia.  PSYCH: Oriented to time, place and person. Memory is preserved. Mood and affect appear normal      RECENT LABS:  Lab on 01/10/2018   Component Date Value Ref Range Status   • WBC 01/10/2018 12.10* 4.80 - 10.80 10*3/mm3 Final   • RBC 01/10/2018 3.38* 4.20 - 5.40 10*6/mm3 Final   • Hemoglobin 01/10/2018 10.2* 12.0 - 16.0 g/dL Final   • Hematocrit 01/10/2018 30.5* 37.0 - 47.0 % Final   • MCV 01/10/2018 90.2  82.0 - 98.0 fL Final   • MCH 01/10/2018 30.2  28.0 - 32.0 pg Final   • MCHC 01/10/2018 33.4  33.0 - 36.0 g/dL Final   • RDW 01/10/2018 17.8* 12.0 - 15.0 % Final   • MPV 01/10/2018 9.3  6.0 - 12.0 fL Final   • Platelets 01/10/2018 220  130 - 400 10*3/mm3 Final   • Neutrophil % 01/10/2018 76.2  39.0 - 78.0 % Final   • Lymphocyte % 01/10/2018 20.7  15.0 - 45.0 % Final   • Auto Mixed Cells % 01/10/2018 3.1  0.1 - 24.0 % Final   • Neutrophils, Absolute 01/10/2018 9.20* 1.50 - 8.30 10*3/mm3 Final   • Lymphocytes, Absolute 01/10/2018 2.50  0.80 - 7.00 10*3/mm3 Final   • Auto Mixed Cells # 01/10/2018 0.40  0.10 - 2.60 10*3/mm3 Final       RADIOLOGY:  No results found.         Assessment/Plan        Anemia secondary to chronic kidney disease, Stage III. Patient has not required VALERIE therapy this far.  2. Iron deficiency in the setting of chronic kidney disease, has benefitted from Feraheme. Had Feraheme in January, hemoglobin has  now near normalized at 10.2.  Plan continue watchful waiting return to clinic in 3 months time and check another CBC and iron studies.  If the iron is still low I may give additional intravenous iron.  Her CMS guidelines I cannot give her Procrit unless hemoglobin is less than 10.    Clinically patient asymptomatic.  I will redraw her iron  studies today.  Hemoglobin is a 10.3gm Today. Per CMS guidelines Rx Procrit 40,000 u s/q every month.       Marco A Melo MD    1/10/2018    1:41 PM

## 2018-01-31 RX ORDER — DULAGLUTIDE 1.5 MG/.5ML
INJECTION, SOLUTION SUBCUTANEOUS
Qty: 6 ML | Refills: 3 | Status: SHIPPED | OUTPATIENT
Start: 2018-01-31 | End: 2019-03-12 | Stop reason: SDUPTHER

## 2018-02-07 ENCOUNTER — OFFICE VISIT (OUTPATIENT)
Dept: ONCOLOGY | Facility: CLINIC | Age: 58
End: 2018-02-07

## 2018-02-07 ENCOUNTER — LAB (OUTPATIENT)
Dept: LAB | Facility: HOSPITAL | Age: 58
End: 2018-02-07

## 2018-02-07 ENCOUNTER — INFUSION (OUTPATIENT)
Dept: ONCOLOGY | Facility: HOSPITAL | Age: 58
End: 2018-02-07
Attending: INTERNAL MEDICINE

## 2018-02-07 VITALS
TEMPERATURE: 97.1 F | SYSTOLIC BLOOD PRESSURE: 158 MMHG | DIASTOLIC BLOOD PRESSURE: 68 MMHG | BODY MASS INDEX: 48.82 KG/M2 | HEIGHT: 65 IN | WEIGHT: 293 LBS | OXYGEN SATURATION: 97 % | HEART RATE: 96 BPM | RESPIRATION RATE: 20 BRPM

## 2018-02-07 VITALS
BODY MASS INDEX: 48.82 KG/M2 | WEIGHT: 293 LBS | SYSTOLIC BLOOD PRESSURE: 153 MMHG | DIASTOLIC BLOOD PRESSURE: 58 MMHG | HEIGHT: 65 IN | TEMPERATURE: 97.1 F | RESPIRATION RATE: 20 BRPM | HEART RATE: 69 BPM

## 2018-02-07 DIAGNOSIS — N18.30 TYPE 2 DIABETES MELLITUS WITH STAGE 3 CHRONIC KIDNEY DISEASE, WITH LONG-TERM CURRENT USE OF INSULIN (HCC): Primary | ICD-10-CM

## 2018-02-07 DIAGNOSIS — N18.30 ANEMIA IN STAGE 3 CHRONIC KIDNEY DISEASE (HCC): Primary | ICD-10-CM

## 2018-02-07 DIAGNOSIS — N18.30 ANEMIA IN STAGE 3 CHRONIC KIDNEY DISEASE (HCC): ICD-10-CM

## 2018-02-07 DIAGNOSIS — Z79.4 TYPE 2 DIABETES MELLITUS WITH STAGE 3 CHRONIC KIDNEY DISEASE, WITH LONG-TERM CURRENT USE OF INSULIN (HCC): Primary | ICD-10-CM

## 2018-02-07 DIAGNOSIS — E11.22 TYPE 2 DIABETES MELLITUS WITH STAGE 3 CHRONIC KIDNEY DISEASE, WITH LONG-TERM CURRENT USE OF INSULIN (HCC): Primary | ICD-10-CM

## 2018-02-07 DIAGNOSIS — D63.1 ANEMIA IN STAGE 3 CHRONIC KIDNEY DISEASE (HCC): Primary | ICD-10-CM

## 2018-02-07 DIAGNOSIS — D63.1 ANEMIA IN STAGE 3 CHRONIC KIDNEY DISEASE (HCC): ICD-10-CM

## 2018-02-07 LAB
ALBUMIN SERPL-MCNC: 3.8 G/DL (ref 3.5–5)
ALBUMIN/GLOB SERPL: 1.2 G/DL (ref 1.1–2.5)
ALP SERPL-CCNC: 72 U/L (ref 24–120)
ALT SERPL W P-5'-P-CCNC: 25 U/L (ref 0–54)
ANION GAP SERPL CALCULATED.3IONS-SCNC: 12 MMOL/L (ref 4–13)
AST SERPL-CCNC: 28 U/L (ref 7–45)
BASOPHILS # BLD AUTO: 0.03 10*3/MM3 (ref 0–0.2)
BASOPHILS NFR BLD AUTO: 0.3 % (ref 0–2)
BILIRUB SERPL-MCNC: 0.2 MG/DL (ref 0.1–1)
BUN BLD-MCNC: 47 MG/DL (ref 5–21)
BUN/CREAT SERPL: 21.9 (ref 7–25)
CALCIUM SPEC-SCNC: 9.6 MG/DL (ref 8.4–10.4)
CHLORIDE SERPL-SCNC: 105 MMOL/L (ref 98–110)
CO2 SERPL-SCNC: 26 MMOL/L (ref 24–31)
CREAT BLD-MCNC: 2.15 MG/DL (ref 0.5–1.4)
DEPRECATED RDW RBC AUTO: 58.3 FL (ref 40–54)
EOSINOPHIL # BLD AUTO: 0.21 10*3/MM3 (ref 0–0.7)
EOSINOPHIL NFR BLD AUTO: 1.8 % (ref 0–4)
ERYTHROCYTE [DISTWIDTH] IN BLOOD BY AUTOMATED COUNT: 17.3 % (ref 12–15)
GFR SERPL CREATININE-BSD FRML MDRD: 24 ML/MIN/1.73
GLOBULIN UR ELPH-MCNC: 3.3 GM/DL
GLUCOSE BLD-MCNC: 160 MG/DL (ref 70–100)
HCT VFR BLD AUTO: 32.5 % (ref 37–47)
HGB BLD-MCNC: 10.3 G/DL (ref 12–16)
HOLD SPECIMEN: NORMAL
HOLD SPECIMEN: NORMAL
IMM GRANULOCYTES # BLD: 0.11 10*3/MM3 (ref 0–0.03)
IMM GRANULOCYTES NFR BLD: 1 % (ref 0–5)
LYMPHOCYTES # BLD AUTO: 2.31 10*3/MM3 (ref 0.72–4.86)
LYMPHOCYTES NFR BLD AUTO: 20.2 % (ref 15–45)
MCH RBC QN AUTO: 28.9 PG (ref 28–32)
MCHC RBC AUTO-ENTMCNC: 31.7 G/DL (ref 33–36)
MCV RBC AUTO: 91.3 FL (ref 82–98)
MONOCYTES # BLD AUTO: 0.59 10*3/MM3 (ref 0.19–1.3)
MONOCYTES NFR BLD AUTO: 5.1 % (ref 4–12)
NEUTROPHILS # BLD AUTO: 8.21 10*3/MM3 (ref 1.87–8.4)
NEUTROPHILS NFR BLD AUTO: 71.6 % (ref 39–78)
NRBC BLD MANUAL-RTO: 0 /100 WBC (ref 0–0)
PLATELET # BLD AUTO: 232 10*3/MM3 (ref 130–400)
PMV BLD AUTO: 10.4 FL (ref 6–12)
POTASSIUM BLD-SCNC: 4.4 MMOL/L (ref 3.5–5.3)
PROT SERPL-MCNC: 7.1 G/DL (ref 6.3–8.7)
RBC # BLD AUTO: 3.56 10*6/MM3 (ref 4.2–5.4)
SODIUM BLD-SCNC: 143 MMOL/L (ref 135–145)
WBC NRBC COR # BLD: 11.46 10*3/MM3 (ref 4.8–10.8)

## 2018-02-07 PROCEDURE — 85025 COMPLETE CBC W/AUTO DIFF WBC: CPT | Performed by: INTERNAL MEDICINE

## 2018-02-07 PROCEDURE — 96372 THER/PROPH/DIAG INJ SC/IM: CPT

## 2018-02-07 PROCEDURE — 63510000001 EPOETIN ALFA PER 1000 UNITS: Performed by: INTERNAL MEDICINE

## 2018-02-07 PROCEDURE — 99214 OFFICE O/P EST MOD 30 MIN: CPT | Performed by: INTERNAL MEDICINE

## 2018-02-07 PROCEDURE — 80053 COMPREHEN METABOLIC PANEL: CPT | Performed by: INTERNAL MEDICINE

## 2018-02-07 PROCEDURE — 36415 COLL VENOUS BLD VENIPUNCTURE: CPT

## 2018-02-07 RX ADMIN — ERYTHROPOIETIN 40000 UNITS: 40000 INJECTION, SOLUTION INTRAVENOUS; SUBCUTANEOUS at 14:21

## 2018-02-07 NOTE — PROGRESS NOTES
[facility]  HEMATOLOGY & ONCOLOGY        Subjective     VISIT DIAGNOSIS:   Encounter Diagnoses   Name Primary?   • Anemia in stage 3 chronic kidney disease    • Type 2 diabetes mellitus with stage 3 chronic kidney disease, with long-term current use of insulin Yes       REASON FOR VISIT:     No chief complaint on file.       HEMATOLOGY / ONCOLOGY HISTORY:Ms. Shrestha is a 55 year old female with past medical history of hypertension, chronic kidney disease, diabetes insulindependent  type 2   No history exists.     [No treatment plan]  Cancer Staging Information:  No matching staging information was found for the patient.      INTERVAL HISTORY  Patient ID: Maria C Shrestha is a 57 y.o. year old female   Anemia secondary to chronic kidney disease, Stage III. Patient has not required VALERIE therapy this far.  2. Iron deficiency in the setting of chronic kidney disease, has benefitted from Feraheme. Had Feraheme in January, hemoglobin has  now near normalized at 10.7        Review of Systems   Constitutional: Negative.    HENT: Negative.    Eyes: Negative.    Respiratory: Negative.    Cardiovascular: Negative.    Gastrointestinal: Negative.    Endocrine: Negative.    Genitourinary: Negative.    Musculoskeletal: Negative.    Skin: Negative.    Allergic/Immunologic: Negative.    Neurological: Negative.    Hematological: Negative.    Psychiatric/Behavioral: Negative.             Medications:    Current Outpatient Prescriptions   Medication Sig Dispense Refill   • allopurinol (ZYLOPRIM) 100 MG tablet Take 100 mg by mouth 2 (Two) Times a Day.     • aspirin 81 MG tablet Take 81 mg by mouth Daily.     • B-D ULTRA-FINE 33 LANCETS misc Use 4 x daily 120 each 11   • busPIRone (BUSPAR) 10 MG tablet Take 10 mg by mouth 2 (Two) Times a Day.     • calcitriol (ROCALTROL) 0.25 MCG capsule Take 0.25 mcg by mouth 3 (Three) Times a Week.     • Calcium Carb-Cholecalciferol (CALCIUM-VITAMIN D3) 600-400 MG-UNIT tablet Take 1 tablet  "by mouth 2 (Two) Times a Day.     • carvedilol (COREG) 3.125 MG tablet Take 3.125 mg by mouth 2 (Two) Times a Day.     • cetirizine (zyrTEC) 10 MG tablet Take 10 mg by mouth As Needed.     • cholecalciferol (VITAMIN D3) 66609 UNITS capsule Take 50,000 Units by mouth Every 7 (Seven) Days.     • citalopram (CeleXA) 20 MG tablet Take 40 mg by mouth Daily.     • furosemide (LASIX) 40 MG tablet Take 1 tablet by mouth Daily. (Patient taking differently: Take 40 mg by mouth As Needed.) 90 tablet 3   • Glucose Blood (BLOOD GLUCOSE TEST) strip Use 4 x daily, use any brand covered by insurance or same brand as before 120 each 11   • insulin glargine (LANTUS) 100 UNIT/ML injection 200 units daily 6 each 11   • insulin regular (HUMULIN R) 500 UNIT/ML CONCENTRATED injection Inject 10 Units under the skin 3 (Three) Times a Day Before Meals.     • Insulin Syringe 31G X 5/16\" 1 ML misc 4 x daily 120 each 11   • Insulin Syringe-Needle U-100 31G X 5/16\" 0.3 ML misc use as indicated 4 times daily. 120 each 11   • lamoTRIgine (LaMICtal) 50 MG tablet dispersible disintegrating tablet Take 50 mg by mouth Daily.     • levothyroxine (SYNTHROID) 50 MCG tablet Take 1 tablet by mouth Daily. 90 tablet 3   • lisinopril (PRINIVIL,ZESTRIL) 20 MG tablet Take 20 mg by mouth 2 (Two) Times a Day.     • Multiple Vitamins-Minerals (MULTIVITAMIN ADULT PO) Take 1 tablet by mouth Daily.     • potassium gluconate 595 MG tablet tablet Take 595 mg by mouth 2 (Two) Times a Day.     • rosuvastatin (CRESTOR) 10 MG tablet Take 10 mg by mouth Every Other Day.     • sodium bicarbonate 650 MG tablet Take 650 mg by mouth 3 (Three) Times a Day.     • TRULICITY 1.5 MG/0.5ML solution pen-injector INJECT 1.5 MG UNDER THE SKIN EVERY 7 DAYS 6 mL 3     No current facility-administered medications for this visit.        ALLERGIES:    Allergies   Allergen Reactions   • Codeine Nausea Only       Objective      @VITALS    Current Status 2/7/2018   ECOG score 3       General " Appearance: Patient is awake, alert, oriented and in no acute distress. Patient is welldeveloped, wellnourished, and appears stated age.  HEENT: Normocephalic. Sclerae clear, conjunctiva pink, extraocular movements intact, pupils, round, reactive to light and  accommodation. Mouth and throat are clear with moist oral mucosa.  NECK: Supple, no jugular venous distention, thyroid not enlarged.  LYMPH: No cervical, supraclavicular, axillary, or inguinal lymphadenopathy.  CHEST: Equal bilateral expansion, AP  diameter normal, resonant percussion note  LUNGS: Good air movement, no rales, rhonchi, rubs or wheezes with auscultation  CARDIO: Regular sinus rhythm, no murmurs, gallops or rubs.  ABDOMEN: Nondistended, soft, No tenderness, no guarding, no rebound, No hepatosplenomegaly. No abdominal masses. Bowel sounds positive. No hernia  GENITALIA: Not examined.  BREASTS: Not examined.  MUSKEL: No joint swelling, decreased motion, or inflammation  EXTREMS: No edema, clubbing, cyanosis, No varicose veins.  NEURO: Grossly nonfocal, Gait is coordinated and smooth, Cognition is preserved.  SKIN: No rashes, no ecchymoses, no petechia.  PSYCH: Oriented to time, place and person. Memory is preserved. Mood and affect appear normal      RECENT LABS:  Lab on 02/07/2018   Component Date Value Ref Range Status   • WBC 02/07/2018 11.46* 4.80 - 10.80 10*3/mm3 Final   • RBC 02/07/2018 3.56* 4.20 - 5.40 10*6/mm3 Final   • Hemoglobin 02/07/2018 10.3* 12.0 - 16.0 g/dL Final   • Hematocrit 02/07/2018 32.5* 37.0 - 47.0 % Final   • MCV 02/07/2018 91.3  82.0 - 98.0 fL Final   • MCH 02/07/2018 28.9  28.0 - 32.0 pg Final   • MCHC 02/07/2018 31.7* 33.0 - 36.0 g/dL Final   • RDW 02/07/2018 17.3* 12.0 - 15.0 % Final   • RDW-SD 02/07/2018 58.3* 40.0 - 54.0 fl Final   • MPV 02/07/2018 10.4  6.0 - 12.0 fL Final   • Platelets 02/07/2018 232  130 - 400 10*3/mm3 Final   • Neutrophil % 02/07/2018 71.6  39.0 - 78.0 % Final   • Lymphocyte % 02/07/2018 20.2   15.0 - 45.0 % Final   • Monocyte % 02/07/2018 5.1  4.0 - 12.0 % Final   • Eosinophil % 02/07/2018 1.8  0.0 - 4.0 % Final   • Basophil % 02/07/2018 0.3  0.0 - 2.0 % Final   • Immature Grans % 02/07/2018 1.0  0.0 - 5.0 % Final   • Neutrophils, Absolute 02/07/2018 8.21  1.87 - 8.40 10*3/mm3 Final   • Lymphocytes, Absolute 02/07/2018 2.31  0.72 - 4.86 10*3/mm3 Final   • Monocytes, Absolute 02/07/2018 0.59  0.19 - 1.30 10*3/mm3 Final   • Eosinophils, Absolute 02/07/2018 0.21  0.00 - 0.70 10*3/mm3 Final   • Basophils, Absolute 02/07/2018 0.03  0.00 - 0.20 10*3/mm3 Final   • Immature Grans, Absolute 02/07/2018 0.11* 0.00 - 0.03 10*3/mm3 Final   • nRBC 02/07/2018 0.0  0.0 - 0.0 /100 WBC Final       RADIOLOGY:  No results found.         Assessment/Plan        Anemia secondary to chronic kidney disease, Stage III. Patient has not required VALERIE therapy this far.  2. Iron deficiency in the setting of chronic kidney disease, has benefitted from Feraheme. Had Feraheme in January, hemoglobin has  now near normalized at 10.3.  Plan continue watchful waiting return to clinic in 3 months time and check another CBC and iron studies.  If the iron is still low I may give additional intravenous iron.  Her CMS guidelines I cannot give her Procrit unless hemoglobin is less than 10.    Clinically patient asymptomatic.  I will redraw her iron studies today.  Hemoglobin is a 10.  Return to clinic in 3 months time.            Marco A Melo MD    2/7/2018    1:31 PM             [facility]  HEMATOLOGY & ONCOLOGY        Subjective     VISIT DIAGNOSIS:   Encounter Diagnoses   Name Primary?   • Anemia in stage 3 chronic kidney disease    • Type 2 diabetes mellitus with stage 3 chronic kidney disease, with long-term current use of insulin Yes       REASON FOR VISIT:     No chief complaint on file.       HEMATOLOGY / ONCOLOGY HISTORY:Ms. Shrestha is a 55 year old female with past medical history of hypertension, chronic kidney disease,  diabetes insulindependent  type 2   No history exists.     [No treatment plan]  Cancer Staging Information:  No matching staging information was found for the patient.      INTERVAL HISTORY  Patient ID: Maria C Shrestha is a 57 y.o. year old female   Anemia secondary to chronic kidney disease, Stage III. Patient has not required VALERIE therapy this far.  2. Iron deficiency in the setting of chronic kidney disease, has benefitted from Feraheme. Had Feraheme in January, hemoglobin has  now near normalized at 10.7        Review of Systems   Constitutional: Negative.    HENT: Negative.    Eyes: Negative.    Respiratory: Negative.    Cardiovascular: Negative.    Gastrointestinal: Negative.    Endocrine: Negative.    Genitourinary: Negative.    Musculoskeletal: Negative.    Skin: Negative.    Allergic/Immunologic: Negative.    Neurological: Negative.    Hematological: Negative.    Psychiatric/Behavioral: Negative.             Medications:    Current Outpatient Prescriptions   Medication Sig Dispense Refill   • allopurinol (ZYLOPRIM) 100 MG tablet Take 100 mg by mouth 2 (Two) Times a Day.     • aspirin 81 MG tablet Take 81 mg by mouth Daily.     • B-D ULTRA-FINE 33 LANCETS misc Use 4 x daily 120 each 11   • busPIRone (BUSPAR) 10 MG tablet Take 10 mg by mouth 2 (Two) Times a Day.     • calcitriol (ROCALTROL) 0.25 MCG capsule Take 0.25 mcg by mouth 3 (Three) Times a Week.     • Calcium Carb-Cholecalciferol (CALCIUM-VITAMIN D3) 600-400 MG-UNIT tablet Take 1 tablet by mouth 2 (Two) Times a Day.     • carvedilol (COREG) 3.125 MG tablet Take 3.125 mg by mouth 2 (Two) Times a Day.     • cetirizine (zyrTEC) 10 MG tablet Take 10 mg by mouth As Needed.     • cholecalciferol (VITAMIN D3) 22759 UNITS capsule Take 50,000 Units by mouth Every 7 (Seven) Days.     • citalopram (CeleXA) 20 MG tablet Take 40 mg by mouth Daily.     • furosemide (LASIX) 40 MG tablet Take 1 tablet by mouth Daily. (Patient taking differently: Take 40 mg by mouth As  "Needed.) 90 tablet 3   • Glucose Blood (BLOOD GLUCOSE TEST) strip Use 4 x daily, use any brand covered by insurance or same brand as before 120 each 11   • insulin glargine (LANTUS) 100 UNIT/ML injection 200 units daily 6 each 11   • insulin regular (HUMULIN R) 500 UNIT/ML CONCENTRATED injection Inject 10 Units under the skin 3 (Three) Times a Day Before Meals.     • Insulin Syringe 31G X 5/16\" 1 ML misc 4 x daily 120 each 11   • Insulin Syringe-Needle U-100 31G X 5/16\" 0.3 ML misc use as indicated 4 times daily. 120 each 11   • lamoTRIgine (LaMICtal) 50 MG tablet dispersible disintegrating tablet Take 50 mg by mouth Daily.     • levothyroxine (SYNTHROID) 50 MCG tablet Take 1 tablet by mouth Daily. 90 tablet 3   • lisinopril (PRINIVIL,ZESTRIL) 20 MG tablet Take 20 mg by mouth 2 (Two) Times a Day.     • Multiple Vitamins-Minerals (MULTIVITAMIN ADULT PO) Take 1 tablet by mouth Daily.     • potassium gluconate 595 MG tablet tablet Take 595 mg by mouth 2 (Two) Times a Day.     • rosuvastatin (CRESTOR) 10 MG tablet Take 10 mg by mouth Every Other Day.     • sodium bicarbonate 650 MG tablet Take 650 mg by mouth 3 (Three) Times a Day.     • TRULICITY 1.5 MG/0.5ML solution pen-injector INJECT 1.5 MG UNDER THE SKIN EVERY 7 DAYS 6 mL 3     No current facility-administered medications for this visit.        ALLERGIES:    Allergies   Allergen Reactions   • Codeine Nausea Only       Objective      @VITALS    Current Status 2/7/2018   ECOG score 3       General Appearance: Patient is awake, alert, oriented and in no acute distress. Patient is welldeveloped, wellnourished, and appears stated age.  HEENT: Normocephalic. Sclerae clear, conjunctiva pink, extraocular movements intact, pupils, round, reactive to light and  accommodation. Mouth and throat are clear with moist oral mucosa.  NECK: Supple, no jugular venous distention, thyroid not enlarged.  LYMPH: No cervical, supraclavicular, axillary, or inguinal " lymphadenopathy.  CHEST: Equal bilateral expansion, AP  diameter normal, resonant percussion note  LUNGS: Good air movement, no rales, rhonchi, rubs or wheezes with auscultation  CARDIO: Regular sinus rhythm, no murmurs, gallops or rubs.  ABDOMEN: Nondistended, soft, No tenderness, no guarding, no rebound, No hepatosplenomegaly. No abdominal masses. Bowel sounds positive. No hernia  GENITALIA: Not examined.  BREASTS: Not examined.  MUSKEL: No joint swelling, decreased motion, or inflammation  EXTREMS: No edema, clubbing, cyanosis, No varicose veins.  NEURO: Grossly nonfocal, Gait is coordinated and smooth, Cognition is preserved.  SKIN: No rashes, no ecchymoses, no petechia.  PSYCH: Oriented to time, place and person. Memory is preserved. Mood and affect appear normal      RECENT LABS:  Lab on 02/07/2018   Component Date Value Ref Range Status   • WBC 02/07/2018 11.46* 4.80 - 10.80 10*3/mm3 Final   • RBC 02/07/2018 3.56* 4.20 - 5.40 10*6/mm3 Final   • Hemoglobin 02/07/2018 10.3* 12.0 - 16.0 g/dL Final   • Hematocrit 02/07/2018 32.5* 37.0 - 47.0 % Final   • MCV 02/07/2018 91.3  82.0 - 98.0 fL Final   • MCH 02/07/2018 28.9  28.0 - 32.0 pg Final   • MCHC 02/07/2018 31.7* 33.0 - 36.0 g/dL Final   • RDW 02/07/2018 17.3* 12.0 - 15.0 % Final   • RDW-SD 02/07/2018 58.3* 40.0 - 54.0 fl Final   • MPV 02/07/2018 10.4  6.0 - 12.0 fL Final   • Platelets 02/07/2018 232  130 - 400 10*3/mm3 Final   • Neutrophil % 02/07/2018 71.6  39.0 - 78.0 % Final   • Lymphocyte % 02/07/2018 20.2  15.0 - 45.0 % Final   • Monocyte % 02/07/2018 5.1  4.0 - 12.0 % Final   • Eosinophil % 02/07/2018 1.8  0.0 - 4.0 % Final   • Basophil % 02/07/2018 0.3  0.0 - 2.0 % Final   • Immature Grans % 02/07/2018 1.0  0.0 - 5.0 % Final   • Neutrophils, Absolute 02/07/2018 8.21  1.87 - 8.40 10*3/mm3 Final   • Lymphocytes, Absolute 02/07/2018 2.31  0.72 - 4.86 10*3/mm3 Final   • Monocytes, Absolute 02/07/2018 0.59  0.19 - 1.30 10*3/mm3 Final   • Eosinophils,  Absolute 02/07/2018 0.21  0.00 - 0.70 10*3/mm3 Final   • Basophils, Absolute 02/07/2018 0.03  0.00 - 0.20 10*3/mm3 Final   • Immature Grans, Absolute 02/07/2018 0.11* 0.00 - 0.03 10*3/mm3 Final   • nRBC 02/07/2018 0.0  0.0 - 0.0 /100 WBC Final       RADIOLOGY:  No results found.         Assessment/Plan        Anemia secondary to chronic kidney disease, Stage III. Patient has not required VALERIE therapy this far.  2. Iron deficiency in the setting of chronic kidney disease, has benefitted from Feraheme. Had Feraheme in January, hemoglobin has  now near normalized at 10.2.  Plan continue watchful waiting return to clinic in 3 months time and check another CBC and iron studies.  If the iron is still low I may give additional intravenous iron.  .    Clinically patient asymptomatic.  I will redraw her iron studies today.  Hemoglobin is a 10.3gm Today. Per CMS guidelines Rx Procrit 40,000 u s/q every month.       Marco A Melo MD    2/7/2018    1:31 PM

## 2018-02-07 NOTE — PROGRESS NOTES
2/7/18 1413 Called and confirmed with Dorys RN at Dr. Melo's that patient is to get procrit injection. Dorys RN confirmed ok to give injection per Dr. Melo. Fadumo Argueta RN

## 2018-03-02 DIAGNOSIS — D63.1 ANEMIA IN STAGE 3 CHRONIC KIDNEY DISEASE (HCC): ICD-10-CM

## 2018-03-02 DIAGNOSIS — N18.30 ANEMIA IN STAGE 3 CHRONIC KIDNEY DISEASE (HCC): ICD-10-CM

## 2018-03-05 RX ORDER — LEVOTHYROXINE SODIUM 0.05 MG/1
TABLET ORAL
Qty: 90 TABLET | Refills: 3 | Status: SHIPPED | OUTPATIENT
Start: 2018-03-05 | End: 2018-04-04 | Stop reason: SDUPTHER

## 2018-03-05 RX ORDER — INSULIN HUMAN 500 [IU]/ML
INJECTION, SOLUTION SUBCUTANEOUS
Qty: 100 ML | Refills: 3 | Status: SHIPPED | OUTPATIENT
Start: 2018-03-05 | End: 2019-02-13 | Stop reason: CLARIF

## 2018-03-07 ENCOUNTER — OFFICE VISIT (OUTPATIENT)
Dept: ONCOLOGY | Facility: CLINIC | Age: 58
End: 2018-03-07

## 2018-03-07 ENCOUNTER — INFUSION (OUTPATIENT)
Dept: ONCOLOGY | Facility: HOSPITAL | Age: 58
End: 2018-03-07
Attending: INTERNAL MEDICINE

## 2018-03-07 ENCOUNTER — LAB (OUTPATIENT)
Dept: LAB | Facility: HOSPITAL | Age: 58
End: 2018-03-07

## 2018-03-07 VITALS
BODY MASS INDEX: 48.82 KG/M2 | OXYGEN SATURATION: 100 % | SYSTOLIC BLOOD PRESSURE: 154 MMHG | DIASTOLIC BLOOD PRESSURE: 70 MMHG | WEIGHT: 293 LBS | TEMPERATURE: 97.6 F | RESPIRATION RATE: 20 BRPM | HEIGHT: 65 IN | HEART RATE: 63 BPM

## 2018-03-07 VITALS
BODY MASS INDEX: 48.82 KG/M2 | HEIGHT: 65 IN | OXYGEN SATURATION: 96 % | WEIGHT: 293 LBS | DIASTOLIC BLOOD PRESSURE: 80 MMHG | HEART RATE: 88 BPM | TEMPERATURE: 97.5 F | RESPIRATION RATE: 18 BRPM | SYSTOLIC BLOOD PRESSURE: 148 MMHG

## 2018-03-07 DIAGNOSIS — D63.1 ANEMIA IN STAGE 3 CHRONIC KIDNEY DISEASE (HCC): Primary | ICD-10-CM

## 2018-03-07 DIAGNOSIS — N18.30 ANEMIA IN STAGE 3 CHRONIC KIDNEY DISEASE (HCC): Primary | ICD-10-CM

## 2018-03-07 DIAGNOSIS — E11.22 TYPE 2 DIABETES MELLITUS WITH STAGE 3 CHRONIC KIDNEY DISEASE, WITH LONG-TERM CURRENT USE OF INSULIN (HCC): Primary | ICD-10-CM

## 2018-03-07 DIAGNOSIS — N18.30 ANEMIA IN STAGE 3 CHRONIC KIDNEY DISEASE (HCC): ICD-10-CM

## 2018-03-07 DIAGNOSIS — N18.30 TYPE 2 DIABETES MELLITUS WITH STAGE 3 CHRONIC KIDNEY DISEASE, WITH LONG-TERM CURRENT USE OF INSULIN (HCC): Primary | ICD-10-CM

## 2018-03-07 DIAGNOSIS — D63.1 ANEMIA IN STAGE 3 CHRONIC KIDNEY DISEASE (HCC): ICD-10-CM

## 2018-03-07 DIAGNOSIS — Z79.4 TYPE 2 DIABETES MELLITUS WITH STAGE 3 CHRONIC KIDNEY DISEASE, WITH LONG-TERM CURRENT USE OF INSULIN (HCC): Primary | ICD-10-CM

## 2018-03-07 LAB
ALBUMIN SERPL-MCNC: 3.7 G/DL (ref 3.5–5)
ALBUMIN/GLOB SERPL: 1.1 G/DL (ref 1.1–2.5)
ALP SERPL-CCNC: 68 U/L (ref 24–120)
ALT SERPL W P-5'-P-CCNC: 21 U/L (ref 0–54)
ANION GAP SERPL CALCULATED.3IONS-SCNC: 13 MMOL/L (ref 4–13)
AST SERPL-CCNC: 22 U/L (ref 7–45)
BASOPHILS # BLD AUTO: 0.02 10*3/MM3 (ref 0–0.2)
BASOPHILS NFR BLD AUTO: 0.2 % (ref 0–2)
BILIRUB SERPL-MCNC: 0.1 MG/DL (ref 0.1–1)
BUN BLD-MCNC: 34 MG/DL (ref 5–21)
BUN/CREAT SERPL: 20.1 (ref 7–25)
CALCIUM SPEC-SCNC: 8.7 MG/DL (ref 8.4–10.4)
CHLORIDE SERPL-SCNC: 107 MMOL/L (ref 98–110)
CO2 SERPL-SCNC: 23 MMOL/L (ref 24–31)
CREAT BLD-MCNC: 1.69 MG/DL (ref 0.5–1.4)
DEPRECATED RDW RBC AUTO: 59.6 FL (ref 40–54)
EOSINOPHIL # BLD AUTO: 0.22 10*3/MM3 (ref 0–0.7)
EOSINOPHIL NFR BLD AUTO: 2.1 % (ref 0–4)
ERYTHROCYTE [DISTWIDTH] IN BLOOD BY AUTOMATED COUNT: 17.7 % (ref 12–15)
FERRITIN SERPL-MCNC: 109 NG/ML (ref 11.1–264)
GFR SERPL CREATININE-BSD FRML MDRD: 31 ML/MIN/1.73
GLOBULIN UR ELPH-MCNC: 3.4 GM/DL
GLUCOSE BLD-MCNC: 201 MG/DL (ref 70–100)
HCT VFR BLD AUTO: 31.9 % (ref 37–47)
HGB BLD-MCNC: 10.1 G/DL (ref 12–16)
HOLD SPECIMEN: NORMAL
HOLD SPECIMEN: NORMAL
IMM GRANULOCYTES # BLD: 0.06 10*3/MM3 (ref 0–0.03)
IMM GRANULOCYTES NFR BLD: 0.6 % (ref 0–5)
IRON 24H UR-MRATE: 54 MCG/DL (ref 42–180)
IRON SATN MFR SERPL: 19 % (ref 20–45)
LYMPHOCYTES # BLD AUTO: 2.04 10*3/MM3 (ref 0.72–4.86)
LYMPHOCYTES NFR BLD AUTO: 19.7 % (ref 15–45)
MCH RBC QN AUTO: 29.2 PG (ref 28–32)
MCHC RBC AUTO-ENTMCNC: 31.7 G/DL (ref 33–36)
MCV RBC AUTO: 92.2 FL (ref 82–98)
MONOCYTES # BLD AUTO: 0.52 10*3/MM3 (ref 0.19–1.3)
MONOCYTES NFR BLD AUTO: 5 % (ref 4–12)
NEUTROPHILS # BLD AUTO: 7.51 10*3/MM3 (ref 1.87–8.4)
NEUTROPHILS NFR BLD AUTO: 72.4 % (ref 39–78)
NRBC BLD MANUAL-RTO: 0 /100 WBC (ref 0–0)
PLATELET # BLD AUTO: 230 10*3/MM3 (ref 130–400)
PMV BLD AUTO: 9.9 FL (ref 6–12)
POTASSIUM BLD-SCNC: 4.6 MMOL/L (ref 3.5–5.3)
PROT SERPL-MCNC: 7.1 G/DL (ref 6.3–8.7)
RBC # BLD AUTO: 3.46 10*6/MM3 (ref 4.2–5.4)
SODIUM BLD-SCNC: 143 MMOL/L (ref 135–145)
TIBC SERPL-MCNC: 289 MCG/DL (ref 225–420)
WBC NRBC COR # BLD: 10.37 10*3/MM3 (ref 4.8–10.8)

## 2018-03-07 PROCEDURE — 80053 COMPREHEN METABOLIC PANEL: CPT | Performed by: INTERNAL MEDICINE

## 2018-03-07 PROCEDURE — 99214 OFFICE O/P EST MOD 30 MIN: CPT | Performed by: INTERNAL MEDICINE

## 2018-03-07 PROCEDURE — 83540 ASSAY OF IRON: CPT | Performed by: INTERNAL MEDICINE

## 2018-03-07 PROCEDURE — 36415 COLL VENOUS BLD VENIPUNCTURE: CPT

## 2018-03-07 PROCEDURE — 82728 ASSAY OF FERRITIN: CPT | Performed by: INTERNAL MEDICINE

## 2018-03-07 PROCEDURE — 83550 IRON BINDING TEST: CPT | Performed by: INTERNAL MEDICINE

## 2018-03-07 PROCEDURE — 63510000001 EPOETIN ALFA PER 1000 UNITS: Performed by: INTERNAL MEDICINE

## 2018-03-07 PROCEDURE — 96372 THER/PROPH/DIAG INJ SC/IM: CPT

## 2018-03-07 PROCEDURE — 85025 COMPLETE CBC W/AUTO DIFF WBC: CPT | Performed by: INTERNAL MEDICINE

## 2018-03-07 RX ADMIN — ERYTHROPOIETIN 40000 UNITS: 40000 INJECTION, SOLUTION INTRAVENOUS; SUBCUTANEOUS at 15:53

## 2018-03-07 NOTE — PROGRESS NOTES
[facility]  HEMATOLOGY & ONCOLOGY        Subjective     VISIT DIAGNOSIS:   Encounter Diagnosis   Name Primary?   • Anemia in stage 3 chronic kidney disease Yes       REASON FOR VISIT:     No chief complaint on file.       HEMATOLOGY / ONCOLOGY HISTORY:Ms. Shrestha is a 55 year old female with past medical history of hypertension, chronic kidney disease, diabetes insulindependent  type 2   No history exists.     [No treatment plan]  Cancer Staging Information:  No matching staging information was found for the patient.      INTERVAL HISTORY  Patient ID: Maria C Shrestha is a 57 y.o. year old female   Anemia secondary to chronic kidney disease, Stage III. Patient has not required VALERIE therapy this far.  2. Iron deficiency in the setting of chronic kidney disease, has benefitted from Feraheme. Had Feraheme in January, hemoglobin has  now near normalized at 10.7        Review of Systems   Constitutional: Negative.    HENT: Negative.    Eyes: Negative.    Respiratory: Negative.    Cardiovascular: Negative.    Gastrointestinal: Negative.    Endocrine: Negative.    Genitourinary: Negative.    Musculoskeletal: Negative.    Skin: Negative.    Allergic/Immunologic: Negative.    Neurological: Negative.    Hematological: Negative.    Psychiatric/Behavioral: Negative.             Medications:    Current Outpatient Prescriptions   Medication Sig Dispense Refill   • allopurinol (ZYLOPRIM) 100 MG tablet Take 100 mg by mouth 2 (Two) Times a Day.     • aspirin 81 MG tablet Take 81 mg by mouth Daily.     • B-D ULTRA-FINE 33 LANCETS misc Use 4 x daily 120 each 11   • busPIRone (BUSPAR) 10 MG tablet Take 10 mg by mouth 2 (Two) Times a Day.     • calcitriol (ROCALTROL) 0.25 MCG capsule Take 0.25 mcg by mouth 3 (Three) Times a Week.     • Calcium Carb-Cholecalciferol (CALCIUM-VITAMIN D3) 600-400 MG-UNIT tablet Take 1 tablet by mouth 2 (Two) Times a Day.     • carvedilol (COREG) 3.125 MG tablet Take 3.125 mg by mouth 2 (Two)  "Times a Day.     • cetirizine (zyrTEC) 10 MG tablet Take 10 mg by mouth As Needed.     • cholecalciferol (VITAMIN D3) 29298 UNITS capsule Take 50,000 Units by mouth Every 7 (Seven) Days.     • citalopram (CeleXA) 20 MG tablet Take 20 mg by mouth Daily.     • furosemide (LASIX) 40 MG tablet Take 1 tablet by mouth Daily. (Patient taking differently: Take 40 mg by mouth As Needed.) 90 tablet 3   • Glucose Blood (BLOOD GLUCOSE TEST) strip Use 4 x daily, use any brand covered by insurance or same brand as before 120 each 11   • HUMULIN R 500 UNIT/ML CONCENTRATED injection USING A U100 SYRINGE DRAW UP TO THE DIRECTED UNIT ROXI AND INJECT FOUR TIMES A DAY (UP TO 30 UNIT ROXI FOUR TIMES A DAY) 100 mL 3   • insulin glargine (LANTUS) 100 UNIT/ML injection 200 units daily 6 each 11   • insulin regular (HUMULIN R) 500 UNIT/ML CONCENTRATED injection Inject 10 Units under the skin 3 (Three) Times a Day Before Meals.     • Insulin Syringe 31G X 5/16\" 1 ML misc 4 x daily 120 each 11   • Insulin Syringe-Needle U-100 31G X 5/16\" 0.3 ML misc use as indicated 4 times daily. 120 each 11   • lamoTRIgine (LaMICtal) 50 MG tablet dispersible disintegrating tablet Take 50 mg by mouth Daily.     • levothyroxine (SYNTHROID, LEVOTHROID) 50 MCG tablet TAKE 1 TABLET DAILY 90 tablet 3   • lisinopril (PRINIVIL,ZESTRIL) 20 MG tablet Take 20 mg by mouth 2 (Two) Times a Day.     • Multiple Vitamins-Minerals (MULTIVITAMIN ADULT PO) Take 1 tablet by mouth Daily.     • potassium gluconate 595 MG tablet tablet Take 595 mg by mouth 2 (Two) Times a Day.     • rosuvastatin (CRESTOR) 10 MG tablet Take 10 mg by mouth Daily.     • sodium bicarbonate 650 MG tablet Take 650 mg by mouth 3 (Three) Times a Day.     • TRULICITY 1.5 MG/0.5ML solution pen-injector INJECT 1.5 MG UNDER THE SKIN EVERY 7 DAYS 6 mL 3     No current facility-administered medications for this visit.        ALLERGIES:    Allergies   Allergen Reactions   • Codeine Nausea Only       Objective  "     @VITALS    Current Status 2/7/2018   ECOG score 3       General Appearance: Patient is awake, alert, oriented and in no acute distress. Patient is welldeveloped, wellnourished, and appears stated age.  HEENT: Normocephalic. Sclerae clear, conjunctiva pink, extraocular movements intact, pupils, round, reactive to light and  accommodation. Mouth and throat are clear with moist oral mucosa.  NECK: Supple, no jugular venous distention, thyroid not enlarged.  LYMPH: No cervical, supraclavicular, axillary, or inguinal lymphadenopathy.  CHEST: Equal bilateral expansion, AP  diameter normal, resonant percussion note  LUNGS: Good air movement, no rales, rhonchi, rubs or wheezes with auscultation  CARDIO: Regular sinus rhythm, no murmurs, gallops or rubs.  ABDOMEN: Nondistended, soft, No tenderness, no guarding, no rebound, No hepatosplenomegaly. No abdominal masses. Bowel sounds positive. No hernia  GENITALIA: Not examined.  BREASTS: Not examined.  MUSKEL: No joint swelling, decreased motion, or inflammation  EXTREMS: No edema, clubbing, cyanosis, No varicose veins.  NEURO: Grossly nonfocal, Gait is coordinated and smooth, Cognition is preserved.  SKIN: No rashes, no ecchymoses, no petechia.  PSYCH: Oriented to time, place and person. Memory is preserved. Mood and affect appear normal      RECENT LABS:  No visits with results within 7 Day(s) from this visit.  Latest known visit with results is:    Lab on 02/07/2018   Component Date Value Ref Range Status   • Glucose 02/07/2018 160* 70 - 100 mg/dL Final   • BUN 02/07/2018 47* 5 - 21 mg/dL Final   • Creatinine 02/07/2018 2.15* 0.50 - 1.40 mg/dL Final   • Sodium 02/07/2018 143  135 - 145 mmol/L Final   • Potassium 02/07/2018 4.4  3.5 - 5.3 mmol/L Final   • Chloride 02/07/2018 105  98 - 110 mmol/L Final   • CO2 02/07/2018 26.0  24.0 - 31.0 mmol/L Final   • Calcium 02/07/2018 9.6  8.4 - 10.4 mg/dL Final   • Total Protein 02/07/2018 7.1  6.3 - 8.7 g/dL Final   • Albumin  02/07/2018 3.80  3.50 - 5.00 g/dL Final   • ALT (SGPT) 02/07/2018 25  0 - 54 U/L Final   • AST (SGOT) 02/07/2018 28  7 - 45 U/L Final   • Alkaline Phosphatase 02/07/2018 72  24 - 120 U/L Final   • Total Bilirubin 02/07/2018 0.2  0.1 - 1.0 mg/dL Final   • eGFR Non African Amer 02/07/2018 24* >60 mL/min/1.73 Final   • Globulin 02/07/2018 3.3  gm/dL Final   • A/G Ratio 02/07/2018 1.2  1.1 - 2.5 g/dL Final   • BUN/Creatinine Ratio 02/07/2018 21.9  7.0 - 25.0 Final   • Anion Gap 02/07/2018 12.0  4.0 - 13.0 mmol/L Final   • WBC 02/07/2018 11.46* 4.80 - 10.80 10*3/mm3 Final   • RBC 02/07/2018 3.56* 4.20 - 5.40 10*6/mm3 Final   • Hemoglobin 02/07/2018 10.3* 12.0 - 16.0 g/dL Final   • Hematocrit 02/07/2018 32.5* 37.0 - 47.0 % Final   • MCV 02/07/2018 91.3  82.0 - 98.0 fL Final   • MCH 02/07/2018 28.9  28.0 - 32.0 pg Final   • MCHC 02/07/2018 31.7* 33.0 - 36.0 g/dL Final   • RDW 02/07/2018 17.3* 12.0 - 15.0 % Final   • RDW-SD 02/07/2018 58.3* 40.0 - 54.0 fl Final   • MPV 02/07/2018 10.4  6.0 - 12.0 fL Final   • Platelets 02/07/2018 232  130 - 400 10*3/mm3 Final   • Neutrophil % 02/07/2018 71.6  39.0 - 78.0 % Final   • Lymphocyte % 02/07/2018 20.2  15.0 - 45.0 % Final   • Monocyte % 02/07/2018 5.1  4.0 - 12.0 % Final   • Eosinophil % 02/07/2018 1.8  0.0 - 4.0 % Final   • Basophil % 02/07/2018 0.3  0.0 - 2.0 % Final   • Immature Grans % 02/07/2018 1.0  0.0 - 5.0 % Final   • Neutrophils, Absolute 02/07/2018 8.21  1.87 - 8.40 10*3/mm3 Final   • Lymphocytes, Absolute 02/07/2018 2.31  0.72 - 4.86 10*3/mm3 Final   • Monocytes, Absolute 02/07/2018 0.59  0.19 - 1.30 10*3/mm3 Final   • Eosinophils, Absolute 02/07/2018 0.21  0.00 - 0.70 10*3/mm3 Final   • Basophils, Absolute 02/07/2018 0.03  0.00 - 0.20 10*3/mm3 Final   • Immature Grans, Absolute 02/07/2018 0.11* 0.00 - 0.03 10*3/mm3 Final   • nRBC 02/07/2018 0.0  0.0 - 0.0 /100 WBC Final   • Extra Tube 02/07/2018 Hold for add-ons.   Final    Auto resulted.   • Extra Tube 02/07/2018  Hold for add-ons.   Final    Auto resulted.       RADIOLOGY:  No results found.         Assessment/Plan        Anemia secondary to chronic kidney disease, Stage III. Patient has not required VALERIE therapy this far.  2. Iron deficiency in the setting of chronic kidney disease, has benefitted from Feraheme. Had Feraheme in January, hemoglobin has  now near normalized at 10.3.  Plan continue watchful waiting return to clinic in 3 months time and check another CBC and iron studies.  If the iron is still low I may give additional intravenous iron.  Her CMS guidelines I cannot give her Procrit unless hemoglobin is less than 10.    Clinically patient asymptomatic.  I will redraw her iron studies today.  Hemoglobin is a 10.  Return to clinic in 3 months time.            Marco A Melo MD    3/7/2018    1:33 PM             [facility]  HEMATOLOGY & ONCOLOGY        Subjective     VISIT DIAGNOSIS:   Encounter Diagnosis   Name Primary?   • Anemia in stage 3 chronic kidney disease Yes       REASON FOR VISIT:     No chief complaint on file.       HEMATOLOGY / ONCOLOGY HISTORY:Ms. Shrestha is a 55 year old female with past medical history of hypertension, chronic kidney disease, diabetes insulindependent  type 2   No history exists.     [No treatment plan]  Cancer Staging Information:  No matching staging information was found for the patient.      INTERVAL HISTORY  Patient ID: Maria C Shrestha is a 57 y.o. year old female   Anemia secondary to chronic kidney disease, Stage III. Patient has not required VALERIE therapy this far.  2. Iron deficiency in the setting of chronic kidney disease, has benefitted from Feraheme. Had Feraheme in January, hemoglobin has  now near normalized at 10.7        Review of Systems   Constitutional: Negative.    HENT: Negative.    Eyes: Negative.    Respiratory: Negative.    Cardiovascular: Negative.    Gastrointestinal: Negative.    Endocrine: Negative.    Genitourinary: Negative.   "  Musculoskeletal: Negative.    Skin: Negative.    Allergic/Immunologic: Negative.    Neurological: Negative.    Hematological: Negative.    Psychiatric/Behavioral: Negative.             Medications:    Current Outpatient Prescriptions   Medication Sig Dispense Refill   • allopurinol (ZYLOPRIM) 100 MG tablet Take 100 mg by mouth 2 (Two) Times a Day.     • aspirin 81 MG tablet Take 81 mg by mouth Daily.     • B-D ULTRA-FINE 33 LANCETS misc Use 4 x daily 120 each 11   • busPIRone (BUSPAR) 10 MG tablet Take 10 mg by mouth 2 (Two) Times a Day.     • calcitriol (ROCALTROL) 0.25 MCG capsule Take 0.25 mcg by mouth 3 (Three) Times a Week.     • Calcium Carb-Cholecalciferol (CALCIUM-VITAMIN D3) 600-400 MG-UNIT tablet Take 1 tablet by mouth 2 (Two) Times a Day.     • carvedilol (COREG) 3.125 MG tablet Take 3.125 mg by mouth 2 (Two) Times a Day.     • cetirizine (zyrTEC) 10 MG tablet Take 10 mg by mouth As Needed.     • cholecalciferol (VITAMIN D3) 41708 UNITS capsule Take 50,000 Units by mouth Every 7 (Seven) Days.     • citalopram (CeleXA) 20 MG tablet Take 20 mg by mouth Daily.     • furosemide (LASIX) 40 MG tablet Take 1 tablet by mouth Daily. (Patient taking differently: Take 40 mg by mouth As Needed.) 90 tablet 3   • Glucose Blood (BLOOD GLUCOSE TEST) strip Use 4 x daily, use any brand covered by insurance or same brand as before 120 each 11   • HUMULIN R 500 UNIT/ML CONCENTRATED injection USING A U100 SYRINGE DRAW UP TO THE DIRECTED UNIT ROXI AND INJECT FOUR TIMES A DAY (UP TO 30 UNIT ROXI FOUR TIMES A DAY) 100 mL 3   • insulin glargine (LANTUS) 100 UNIT/ML injection 200 units daily 6 each 11   • insulin regular (HUMULIN R) 500 UNIT/ML CONCENTRATED injection Inject 10 Units under the skin 3 (Three) Times a Day Before Meals.     • Insulin Syringe 31G X 5/16\" 1 ML misc 4 x daily 120 each 11   • Insulin Syringe-Needle U-100 31G X 5/16\" 0.3 ML misc use as indicated 4 times daily. 120 each 11   • lamoTRIgine (LaMICtal) 50 MG " tablet dispersible disintegrating tablet Take 50 mg by mouth Daily.     • levothyroxine (SYNTHROID, LEVOTHROID) 50 MCG tablet TAKE 1 TABLET DAILY 90 tablet 3   • lisinopril (PRINIVIL,ZESTRIL) 20 MG tablet Take 20 mg by mouth 2 (Two) Times a Day.     • Multiple Vitamins-Minerals (MULTIVITAMIN ADULT PO) Take 1 tablet by mouth Daily.     • potassium gluconate 595 MG tablet tablet Take 595 mg by mouth 2 (Two) Times a Day.     • rosuvastatin (CRESTOR) 10 MG tablet Take 10 mg by mouth Daily.     • sodium bicarbonate 650 MG tablet Take 650 mg by mouth 3 (Three) Times a Day.     • TRULICITY 1.5 MG/0.5ML solution pen-injector INJECT 1.5 MG UNDER THE SKIN EVERY 7 DAYS 6 mL 3     No current facility-administered medications for this visit.        ALLERGIES:    Allergies   Allergen Reactions   • Codeine Nausea Only       Objective      @VITALS    Current Status 2/7/2018   ECOG score 3       General Appearance: Patient is awake, alert, oriented and in no acute distress. Patient is welldeveloped, wellnourished, and appears stated age.  HEENT: Normocephalic. Sclerae clear, conjunctiva pink, extraocular movements intact, pupils, round, reactive to light and  accommodation. Mouth and throat are clear with moist oral mucosa.  NECK: Supple, no jugular venous distention, thyroid not enlarged.  LYMPH: No cervical, supraclavicular, axillary, or inguinal lymphadenopathy.  CHEST: Equal bilateral expansion, AP  diameter normal, resonant percussion note  LUNGS: Good air movement, no rales, rhonchi, rubs or wheezes with auscultation  CARDIO: Regular sinus rhythm, no murmurs, gallops or rubs.  ABDOMEN: Nondistended, soft, No tenderness, no guarding, no rebound, No hepatosplenomegaly. No abdominal masses. Bowel sounds positive. No hernia  GENITALIA: Not examined.  BREASTS: Not examined.  MUSKEL: No joint swelling, decreased motion, or inflammation  EXTREMS: No edema, clubbing, cyanosis, No varicose veins.  NEURO: Grossly nonfocal, Gait is  coordinated and smooth, Cognition is preserved.  SKIN: No rashes, no ecchymoses, no petechia.  PSYCH: Oriented to time, place and person. Memory is preserved. Mood and affect appear normal      RECENT LABS:  No visits with results within 7 Day(s) from this visit.  Latest known visit with results is:    Lab on 02/07/2018   Component Date Value Ref Range Status   • Glucose 02/07/2018 160* 70 - 100 mg/dL Final   • BUN 02/07/2018 47* 5 - 21 mg/dL Final   • Creatinine 02/07/2018 2.15* 0.50 - 1.40 mg/dL Final   • Sodium 02/07/2018 143  135 - 145 mmol/L Final   • Potassium 02/07/2018 4.4  3.5 - 5.3 mmol/L Final   • Chloride 02/07/2018 105  98 - 110 mmol/L Final   • CO2 02/07/2018 26.0  24.0 - 31.0 mmol/L Final   • Calcium 02/07/2018 9.6  8.4 - 10.4 mg/dL Final   • Total Protein 02/07/2018 7.1  6.3 - 8.7 g/dL Final   • Albumin 02/07/2018 3.80  3.50 - 5.00 g/dL Final   • ALT (SGPT) 02/07/2018 25  0 - 54 U/L Final   • AST (SGOT) 02/07/2018 28  7 - 45 U/L Final   • Alkaline Phosphatase 02/07/2018 72  24 - 120 U/L Final   • Total Bilirubin 02/07/2018 0.2  0.1 - 1.0 mg/dL Final   • eGFR Non African Amer 02/07/2018 24* >60 mL/min/1.73 Final   • Globulin 02/07/2018 3.3  gm/dL Final   • A/G Ratio 02/07/2018 1.2  1.1 - 2.5 g/dL Final   • BUN/Creatinine Ratio 02/07/2018 21.9  7.0 - 25.0 Final   • Anion Gap 02/07/2018 12.0  4.0 - 13.0 mmol/L Final   • WBC 02/07/2018 11.46* 4.80 - 10.80 10*3/mm3 Final   • RBC 02/07/2018 3.56* 4.20 - 5.40 10*6/mm3 Final   • Hemoglobin 02/07/2018 10.3* 12.0 - 16.0 g/dL Final   • Hematocrit 02/07/2018 32.5* 37.0 - 47.0 % Final   • MCV 02/07/2018 91.3  82.0 - 98.0 fL Final   • MCH 02/07/2018 28.9  28.0 - 32.0 pg Final   • MCHC 02/07/2018 31.7* 33.0 - 36.0 g/dL Final   • RDW 02/07/2018 17.3* 12.0 - 15.0 % Final   • RDW-SD 02/07/2018 58.3* 40.0 - 54.0 fl Final   • MPV 02/07/2018 10.4  6.0 - 12.0 fL Final   • Platelets 02/07/2018 232  130 - 400 10*3/mm3 Final   • Neutrophil % 02/07/2018 71.6  39.0 - 78.0 %  Final   • Lymphocyte % 02/07/2018 20.2  15.0 - 45.0 % Final   • Monocyte % 02/07/2018 5.1  4.0 - 12.0 % Final   • Eosinophil % 02/07/2018 1.8  0.0 - 4.0 % Final   • Basophil % 02/07/2018 0.3  0.0 - 2.0 % Final   • Immature Grans % 02/07/2018 1.0  0.0 - 5.0 % Final   • Neutrophils, Absolute 02/07/2018 8.21  1.87 - 8.40 10*3/mm3 Final   • Lymphocytes, Absolute 02/07/2018 2.31  0.72 - 4.86 10*3/mm3 Final   • Monocytes, Absolute 02/07/2018 0.59  0.19 - 1.30 10*3/mm3 Final   • Eosinophils, Absolute 02/07/2018 0.21  0.00 - 0.70 10*3/mm3 Final   • Basophils, Absolute 02/07/2018 0.03  0.00 - 0.20 10*3/mm3 Final   • Immature Grans, Absolute 02/07/2018 0.11* 0.00 - 0.03 10*3/mm3 Final   • nRBC 02/07/2018 0.0  0.0 - 0.0 /100 WBC Final   • Extra Tube 02/07/2018 Hold for add-ons.   Final    Auto resulted.   • Extra Tube 02/07/2018 Hold for add-ons.   Final    Auto resulted.       RADIOLOGY:  No results found.         Assessment/Plan        Anemia secondary to chronic kidney disease, Stage III. Patient has not required VALERIE therapy this far.  2. Iron deficiency in the setting of chronic kidney disease, has benefitted from Feraheme. Had Feraheme in January, hemoglobin has  now near normalized at 10.2.  Plan continue watchful waiting return to clinic in 3 months time and check another CBC and iron studies.  If the iron is still low I may give additional intravenous iron.  .    Clinically patient asymptomatic.  I will redraw her iron studies today.  Hemoglobin is a 10.3gm Today. Per CMS guidelines Rx Procrit 40,000 u s/q every month.       Marco A Melo MD    3/7/2018    1:33 PM             [facility]  HEMATOLOGY & ONCOLOGY        Subjective     VISIT DIAGNOSIS:   Encounter Diagnosis   Name Primary?   • Anemia in stage 3 chronic kidney disease Yes       REASON FOR VISIT:     No chief complaint on file.       HEMATOLOGY / ONCOLOGY HISTORY:Ms. Shrestha is a 55 year old female with past medical history of hypertension,  chronic kidney disease, diabetes insulindependent  type 2   No history exists.     [No treatment plan]  Cancer Staging Information:  No matching staging information was found for the patient.      INTERVAL HISTORY  Patient ID: Maria C Shrestha is a 57 y.o. year old female   Anemia secondary to chronic kidney disease, Stage III. Patient has not required VALERIE therapy this far.  2. Iron deficiency in the setting of chronic kidney disease, has benefitted from Feraheme. Had Feraheme in January, hemoglobin has  now near normalized at 10.7        Review of Systems   Constitutional: Negative.    HENT: Negative.    Eyes: Negative.    Respiratory: Negative.    Cardiovascular: Negative.    Gastrointestinal: Negative.    Endocrine: Negative.    Genitourinary: Negative.    Musculoskeletal: Negative.    Skin: Negative.    Allergic/Immunologic: Negative.    Neurological: Negative.    Hematological: Negative.    Psychiatric/Behavioral: Negative.             Medications:    Current Outpatient Prescriptions   Medication Sig Dispense Refill   • allopurinol (ZYLOPRIM) 100 MG tablet Take 100 mg by mouth 2 (Two) Times a Day.     • aspirin 81 MG tablet Take 81 mg by mouth Daily.     • B-D ULTRA-FINE 33 LANCETS misc Use 4 x daily 120 each 11   • busPIRone (BUSPAR) 10 MG tablet Take 10 mg by mouth 2 (Two) Times a Day.     • calcitriol (ROCALTROL) 0.25 MCG capsule Take 0.25 mcg by mouth 3 (Three) Times a Week.     • Calcium Carb-Cholecalciferol (CALCIUM-VITAMIN D3) 600-400 MG-UNIT tablet Take 1 tablet by mouth 2 (Two) Times a Day.     • carvedilol (COREG) 3.125 MG tablet Take 3.125 mg by mouth 2 (Two) Times a Day.     • cetirizine (zyrTEC) 10 MG tablet Take 10 mg by mouth As Needed.     • cholecalciferol (VITAMIN D3) 37913 UNITS capsule Take 50,000 Units by mouth Every 7 (Seven) Days.     • citalopram (CeleXA) 20 MG tablet Take 20 mg by mouth Daily.     • furosemide (LASIX) 40 MG tablet Take 1 tablet by mouth Daily. (Patient taking differently:  "Take 40 mg by mouth As Needed.) 90 tablet 3   • Glucose Blood (BLOOD GLUCOSE TEST) strip Use 4 x daily, use any brand covered by insurance or same brand as before 120 each 11   • HUMULIN R 500 UNIT/ML CONCENTRATED injection USING A U100 SYRINGE DRAW UP TO THE DIRECTED UNIT ROXI AND INJECT FOUR TIMES A DAY (UP TO 30 UNIT ROXI FOUR TIMES A DAY) 100 mL 3   • insulin glargine (LANTUS) 100 UNIT/ML injection 200 units daily 6 each 11   • insulin regular (HUMULIN R) 500 UNIT/ML CONCENTRATED injection Inject 10 Units under the skin 3 (Three) Times a Day Before Meals.     • Insulin Syringe 31G X 5/16\" 1 ML misc 4 x daily 120 each 11   • Insulin Syringe-Needle U-100 31G X 5/16\" 0.3 ML misc use as indicated 4 times daily. 120 each 11   • lamoTRIgine (LaMICtal) 50 MG tablet dispersible disintegrating tablet Take 50 mg by mouth Daily.     • levothyroxine (SYNTHROID, LEVOTHROID) 50 MCG tablet TAKE 1 TABLET DAILY 90 tablet 3   • lisinopril (PRINIVIL,ZESTRIL) 20 MG tablet Take 20 mg by mouth 2 (Two) Times a Day.     • Multiple Vitamins-Minerals (MULTIVITAMIN ADULT PO) Take 1 tablet by mouth Daily.     • potassium gluconate 595 MG tablet tablet Take 595 mg by mouth 2 (Two) Times a Day.     • rosuvastatin (CRESTOR) 10 MG tablet Take 10 mg by mouth Daily.     • sodium bicarbonate 650 MG tablet Take 650 mg by mouth 3 (Three) Times a Day.     • TRULICITY 1.5 MG/0.5ML solution pen-injector INJECT 1.5 MG UNDER THE SKIN EVERY 7 DAYS 6 mL 3     No current facility-administered medications for this visit.        ALLERGIES:    Allergies   Allergen Reactions   • Codeine Nausea Only       Objective      @VITALS    Current Status 3/7/2018   ECOG score 1       General Appearance: Patient is awake, alert, oriented and in no acute distress. Patient is welldeveloped, wellnourished, and appears stated age.  HEENT: Normocephalic. Sclerae clear, conjunctiva pink, extraocular movements intact, pupils, round, reactive to light and  accommodation. Mouth " and throat are clear with moist oral mucosa.  NECK: Supple, no jugular venous distention, thyroid not enlarged.  LYMPH: No cervical, supraclavicular, axillary, or inguinal lymphadenopathy.  CHEST: Equal bilateral expansion, AP  diameter normal, resonant percussion note  LUNGS: Good air movement, no rales, rhonchi, rubs or wheezes with auscultation  CARDIO: Regular sinus rhythm, no murmurs, gallops or rubs.  ABDOMEN: Nondistended, soft, No tenderness, no guarding, no rebound, No hepatosplenomegaly. No abdominal masses. Bowel sounds positive. No hernia  GENITALIA: Not examined.  BREASTS: Not examined.  MUSKEL: No joint swelling, decreased motion, or inflammation  EXTREMS: No edema, clubbing, cyanosis, No varicose veins.  NEURO: Grossly nonfocal, Gait is coordinated and smooth, Cognition is preserved.  SKIN: No rashes, no ecchymoses, no petechia.  PSYCH: Oriented to time, place and person. Memory is preserved. Mood and affect appear normal      RECENT LABS:  Lab on 03/07/2018   Component Date Value Ref Range Status   • Glucose 03/07/2018 201* 70 - 100 mg/dL Final   • BUN 03/07/2018 34* 5 - 21 mg/dL Final   • Creatinine 03/07/2018 1.69* 0.50 - 1.40 mg/dL Final   • Sodium 03/07/2018 143  135 - 145 mmol/L Final   • Potassium 03/07/2018 4.6  3.5 - 5.3 mmol/L Final   • Chloride 03/07/2018 107  98 - 110 mmol/L Final   • CO2 03/07/2018 23.0* 24.0 - 31.0 mmol/L Final   • Calcium 03/07/2018 8.7  8.4 - 10.4 mg/dL Final   • Total Protein 03/07/2018 7.1  6.3 - 8.7 g/dL Final   • Albumin 03/07/2018 3.70  3.50 - 5.00 g/dL Final   • ALT (SGPT) 03/07/2018 21  0 - 54 U/L Final   • AST (SGOT) 03/07/2018 22  7 - 45 U/L Final   • Alkaline Phosphatase 03/07/2018 68  24 - 120 U/L Final   • Total Bilirubin 03/07/2018 0.1  0.1 - 1.0 mg/dL Final   • eGFR Non African Amer 03/07/2018 31* >60 mL/min/1.73 Final   • Globulin 03/07/2018 3.4  gm/dL Final   • A/G Ratio 03/07/2018 1.1  1.1 - 2.5 g/dL Final   • BUN/Creatinine Ratio 03/07/2018 20.1  7.0  - 25.0 Final   • Anion Gap 03/07/2018 13.0  4.0 - 13.0 mmol/L Final   • WBC 03/07/2018 10.37  4.80 - 10.80 10*3/mm3 Final   • RBC 03/07/2018 3.46* 4.20 - 5.40 10*6/mm3 Final   • Hemoglobin 03/07/2018 10.1* 12.0 - 16.0 g/dL Final   • Hematocrit 03/07/2018 31.9* 37.0 - 47.0 % Final   • MCV 03/07/2018 92.2  82.0 - 98.0 fL Final   • MCH 03/07/2018 29.2  28.0 - 32.0 pg Final   • MCHC 03/07/2018 31.7* 33.0 - 36.0 g/dL Final   • RDW 03/07/2018 17.7* 12.0 - 15.0 % Final   • RDW-SD 03/07/2018 59.6* 40.0 - 54.0 fl Final   • MPV 03/07/2018 9.9  6.0 - 12.0 fL Final   • Platelets 03/07/2018 230  130 - 400 10*3/mm3 Final   • Neutrophil % 03/07/2018 72.4  39.0 - 78.0 % Final   • Lymphocyte % 03/07/2018 19.7  15.0 - 45.0 % Final   • Monocyte % 03/07/2018 5.0  4.0 - 12.0 % Final   • Eosinophil % 03/07/2018 2.1  0.0 - 4.0 % Final   • Basophil % 03/07/2018 0.2  0.0 - 2.0 % Final   • Immature Grans % 03/07/2018 0.6  0.0 - 5.0 % Final   • Neutrophils, Absolute 03/07/2018 7.51  1.87 - 8.40 10*3/mm3 Final   • Lymphocytes, Absolute 03/07/2018 2.04  0.72 - 4.86 10*3/mm3 Final   • Monocytes, Absolute 03/07/2018 0.52  0.19 - 1.30 10*3/mm3 Final   • Eosinophils, Absolute 03/07/2018 0.22  0.00 - 0.70 10*3/mm3 Final   • Basophils, Absolute 03/07/2018 0.02  0.00 - 0.20 10*3/mm3 Final   • Immature Grans, Absolute 03/07/2018 0.06* 0.00 - 0.03 10*3/mm3 Final   • nRBC 03/07/2018 0.0  0.0 - 0.0 /100 WBC Final       RADIOLOGY:  No results found.         Assessment/Plan        Anemia secondary to chronic kidney disease, Stage III. Patient has not required VALERIE therapy this far.  2. Iron deficiency in the setting of chronic kidney disease, has benefitted from Feraheme. Had Feraheme in January, hemoglobin has  now near normalized at 10.3.  Plan continue watchful waiting return to clinic in 3 months time and check another CBC and iron studies.  If the iron is still low I may give additional intravenous iron.  Her CMS guidelines I cannot give her Procrit  unless hemoglobin is less than 10.    Clinically patient asymptomatic.  I will redraw her iron studies today.  Hemoglobin is a 10.  Return to clinic in 3 months time.            Marco A Melo MD    3/7/2018    3:09 PM             [facility]  HEMATOLOGY & ONCOLOGY        Subjective     VISIT DIAGNOSIS:   Encounter Diagnosis   Name Primary?   • Anemia in stage 3 chronic kidney disease Yes       REASON FOR VISIT:     No chief complaint on file.       HEMATOLOGY / ONCOLOGY HISTORY:Ms. Shrestha is a 55 year old female with past medical history of hypertension, chronic kidney disease, diabetes insulindependent  type 2   No history exists.     [No treatment plan]  Cancer Staging Information:  No matching staging information was found for the patient.      INTERVAL HISTORY  Patient ID: Maria C Shrestha is a 57 y.o. year old female   Anemia secondary to chronic kidney disease, Stage III. Patient has not required VALERIE therapy this far.  2. Iron deficiency in the setting of chronic kidney disease, has benefitted from Feraheme. Had Feraheme in January, hemoglobin has  now near normalized at 10.7        Review of Systems   Constitutional: Negative.    HENT: Negative.    Eyes: Negative.    Respiratory: Negative.    Cardiovascular: Negative.    Gastrointestinal: Negative.    Endocrine: Negative.    Genitourinary: Negative.    Musculoskeletal: Negative.    Skin: Negative.    Allergic/Immunologic: Negative.    Neurological: Negative.    Hematological: Negative.    Psychiatric/Behavioral: Negative.             Medications:    Current Outpatient Prescriptions   Medication Sig Dispense Refill   • allopurinol (ZYLOPRIM) 100 MG tablet Take 100 mg by mouth 2 (Two) Times a Day.     • aspirin 81 MG tablet Take 81 mg by mouth Daily.     • B-D ULTRA-FINE 33 LANCETS misc Use 4 x daily 120 each 11   • busPIRone (BUSPAR) 10 MG tablet Take 10 mg by mouth 2 (Two) Times a Day.     • calcitriol (ROCALTROL) 0.25 MCG capsule Take 0.25 mcg  "by mouth 3 (Three) Times a Week.     • Calcium Carb-Cholecalciferol (CALCIUM-VITAMIN D3) 600-400 MG-UNIT tablet Take 1 tablet by mouth 2 (Two) Times a Day.     • carvedilol (COREG) 3.125 MG tablet Take 3.125 mg by mouth 2 (Two) Times a Day.     • cetirizine (zyrTEC) 10 MG tablet Take 10 mg by mouth As Needed.     • cholecalciferol (VITAMIN D3) 54419 UNITS capsule Take 50,000 Units by mouth Every 7 (Seven) Days.     • citalopram (CeleXA) 20 MG tablet Take 20 mg by mouth Daily.     • furosemide (LASIX) 40 MG tablet Take 1 tablet by mouth Daily. (Patient taking differently: Take 40 mg by mouth As Needed.) 90 tablet 3   • Glucose Blood (BLOOD GLUCOSE TEST) strip Use 4 x daily, use any brand covered by insurance or same brand as before 120 each 11   • HUMULIN R 500 UNIT/ML CONCENTRATED injection USING A U100 SYRINGE DRAW UP TO THE DIRECTED UNIT ROXI AND INJECT FOUR TIMES A DAY (UP TO 30 UNIT ROXI FOUR TIMES A DAY) 100 mL 3   • insulin glargine (LANTUS) 100 UNIT/ML injection 200 units daily 6 each 11   • insulin regular (HUMULIN R) 500 UNIT/ML CONCENTRATED injection Inject 10 Units under the skin 3 (Three) Times a Day Before Meals.     • Insulin Syringe 31G X 5/16\" 1 ML misc 4 x daily 120 each 11   • Insulin Syringe-Needle U-100 31G X 5/16\" 0.3 ML misc use as indicated 4 times daily. 120 each 11   • lamoTRIgine (LaMICtal) 50 MG tablet dispersible disintegrating tablet Take 50 mg by mouth Daily.     • levothyroxine (SYNTHROID, LEVOTHROID) 50 MCG tablet TAKE 1 TABLET DAILY 90 tablet 3   • lisinopril (PRINIVIL,ZESTRIL) 20 MG tablet Take 20 mg by mouth 2 (Two) Times a Day.     • Multiple Vitamins-Minerals (MULTIVITAMIN ADULT PO) Take 1 tablet by mouth Daily.     • potassium gluconate 595 MG tablet tablet Take 595 mg by mouth 2 (Two) Times a Day.     • rosuvastatin (CRESTOR) 10 MG tablet Take 10 mg by mouth Daily.     • sodium bicarbonate 650 MG tablet Take 650 mg by mouth 3 (Three) Times a Day.     • TRULICITY 1.5 MG/0.5ML " solution pen-injector INJECT 1.5 MG UNDER THE SKIN EVERY 7 DAYS 6 mL 3     No current facility-administered medications for this visit.        ALLERGIES:    Allergies   Allergen Reactions   • Codeine Nausea Only       Objective      @VITALS    Current Status 3/7/2018   ECOG score 1       General Appearance: Patient is awake, alert, oriented and in no acute distress. Patient is welldeveloped, wellnourished, and appears stated age.  HEENT: Normocephalic. Sclerae clear, conjunctiva pink, extraocular movements intact, pupils, round, reactive to light and  accommodation. Mouth and throat are clear with moist oral mucosa.  NECK: Supple, no jugular venous distention, thyroid not enlarged.  LYMPH: No cervical, supraclavicular, axillary, or inguinal lymphadenopathy.  CHEST: Equal bilateral expansion, AP  diameter normal, resonant percussion note  LUNGS: Good air movement, no rales, rhonchi, rubs or wheezes with auscultation  CARDIO: Regular sinus rhythm, no murmurs, gallops or rubs.  ABDOMEN: Nondistended, soft, No tenderness, no guarding, no rebound, No hepatosplenomegaly. No abdominal masses. Bowel sounds positive. No hernia  GENITALIA: Not examined.  BREASTS: Not examined.  MUSKEL: No joint swelling, decreased motion, or inflammation  EXTREMS: No edema, clubbing, cyanosis, No varicose veins.  NEURO: Grossly nonfocal, Gait is coordinated and smooth, Cognition is preserved.  SKIN: No rashes, no ecchymoses, no petechia.  PSYCH: Oriented to time, place and person. Memory is preserved. Mood and affect appear normal      RECENT LABS:  Lab on 03/07/2018   Component Date Value Ref Range Status   • Glucose 03/07/2018 201* 70 - 100 mg/dL Final   • BUN 03/07/2018 34* 5 - 21 mg/dL Final   • Creatinine 03/07/2018 1.69* 0.50 - 1.40 mg/dL Final   • Sodium 03/07/2018 143  135 - 145 mmol/L Final   • Potassium 03/07/2018 4.6  3.5 - 5.3 mmol/L Final   • Chloride 03/07/2018 107  98 - 110 mmol/L Final   • CO2 03/07/2018 23.0* 24.0 - 31.0  mmol/L Final   • Calcium 03/07/2018 8.7  8.4 - 10.4 mg/dL Final   • Total Protein 03/07/2018 7.1  6.3 - 8.7 g/dL Final   • Albumin 03/07/2018 3.70  3.50 - 5.00 g/dL Final   • ALT (SGPT) 03/07/2018 21  0 - 54 U/L Final   • AST (SGOT) 03/07/2018 22  7 - 45 U/L Final   • Alkaline Phosphatase 03/07/2018 68  24 - 120 U/L Final   • Total Bilirubin 03/07/2018 0.1  0.1 - 1.0 mg/dL Final   • eGFR Non African Amer 03/07/2018 31* >60 mL/min/1.73 Final   • Globulin 03/07/2018 3.4  gm/dL Final   • A/G Ratio 03/07/2018 1.1  1.1 - 2.5 g/dL Final   • BUN/Creatinine Ratio 03/07/2018 20.1  7.0 - 25.0 Final   • Anion Gap 03/07/2018 13.0  4.0 - 13.0 mmol/L Final   • WBC 03/07/2018 10.37  4.80 - 10.80 10*3/mm3 Final   • RBC 03/07/2018 3.46* 4.20 - 5.40 10*6/mm3 Final   • Hemoglobin 03/07/2018 10.1* 12.0 - 16.0 g/dL Final   • Hematocrit 03/07/2018 31.9* 37.0 - 47.0 % Final   • MCV 03/07/2018 92.2  82.0 - 98.0 fL Final   • MCH 03/07/2018 29.2  28.0 - 32.0 pg Final   • MCHC 03/07/2018 31.7* 33.0 - 36.0 g/dL Final   • RDW 03/07/2018 17.7* 12.0 - 15.0 % Final   • RDW-SD 03/07/2018 59.6* 40.0 - 54.0 fl Final   • MPV 03/07/2018 9.9  6.0 - 12.0 fL Final   • Platelets 03/07/2018 230  130 - 400 10*3/mm3 Final   • Neutrophil % 03/07/2018 72.4  39.0 - 78.0 % Final   • Lymphocyte % 03/07/2018 19.7  15.0 - 45.0 % Final   • Monocyte % 03/07/2018 5.0  4.0 - 12.0 % Final   • Eosinophil % 03/07/2018 2.1  0.0 - 4.0 % Final   • Basophil % 03/07/2018 0.2  0.0 - 2.0 % Final   • Immature Grans % 03/07/2018 0.6  0.0 - 5.0 % Final   • Neutrophils, Absolute 03/07/2018 7.51  1.87 - 8.40 10*3/mm3 Final   • Lymphocytes, Absolute 03/07/2018 2.04  0.72 - 4.86 10*3/mm3 Final   • Monocytes, Absolute 03/07/2018 0.52  0.19 - 1.30 10*3/mm3 Final   • Eosinophils, Absolute 03/07/2018 0.22  0.00 - 0.70 10*3/mm3 Final   • Basophils, Absolute 03/07/2018 0.02  0.00 - 0.20 10*3/mm3 Final   • Immature Grans, Absolute 03/07/2018 0.06* 0.00 - 0.03 10*3/mm3 Final   • nRBC 03/07/2018  0.0  0.0 - 0.0 /100 WBC Final       RADIOLOGY:  No results found.         Assessment/Plan        Anemia secondary to chronic kidney disease, Stage III. Patient has not required VALERIE therapy this far.  2. Iron deficiency in the setting of chronic kidney disease, has benefitted from Feraheme. Had Feraheme in January, hemoglobin has  now near normalized at 10.1.  Plan continue watchful waiting return to clinic in 3 months time and check another CBC and iron studies.  If the iron is still low I may give additional intravenous iron.  .    Clinically patient asymptomatic.  I will redraw her iron studies today.  Hemoglobin is a 10.1gm Today. Per CMS guidelines Rx Procrit 40,000 u s/q every month.       Marco A Melo MD    3/7/2018    3:09 PM

## 2018-03-13 ENCOUNTER — TELEPHONE (OUTPATIENT)
Dept: FAMILY MEDICINE CLINIC | Facility: CLINIC | Age: 58
End: 2018-03-13

## 2018-03-15 RX ORDER — GLUCOSAMINE HCL/CHONDROITIN SU 500-400 MG
CAPSULE ORAL
Qty: 360 EACH | Refills: 3 | Status: ON HOLD | OUTPATIENT
Start: 2018-03-15 | End: 2022-11-28

## 2018-03-15 RX ORDER — INSULIN GLARGINE 100 [IU]/ML
INJECTION, SOLUTION SUBCUTANEOUS
Qty: 18 EACH | Refills: 3 | Status: SHIPPED | OUTPATIENT
Start: 2018-03-15 | End: 2019-04-25 | Stop reason: SDUPTHER

## 2018-03-23 DIAGNOSIS — N18.30 ANEMIA IN STAGE 3 CHRONIC KIDNEY DISEASE (HCC): ICD-10-CM

## 2018-03-23 DIAGNOSIS — D63.1 ANEMIA IN STAGE 3 CHRONIC KIDNEY DISEASE (HCC): ICD-10-CM

## 2018-04-04 ENCOUNTER — LAB (OUTPATIENT)
Dept: LAB | Facility: HOSPITAL | Age: 58
End: 2018-04-04

## 2018-04-04 ENCOUNTER — INFUSION (OUTPATIENT)
Dept: ONCOLOGY | Facility: HOSPITAL | Age: 58
End: 2018-04-04
Attending: INTERNAL MEDICINE

## 2018-04-04 ENCOUNTER — TELEPHONE (OUTPATIENT)
Dept: FAMILY MEDICINE CLINIC | Facility: CLINIC | Age: 58
End: 2018-04-04

## 2018-04-04 ENCOUNTER — OFFICE VISIT (OUTPATIENT)
Dept: ONCOLOGY | Facility: CLINIC | Age: 58
End: 2018-04-04

## 2018-04-04 VITALS
TEMPERATURE: 98.4 F | HEART RATE: 79 BPM | OXYGEN SATURATION: 99 % | HEIGHT: 65 IN | BODY MASS INDEX: 48.82 KG/M2 | WEIGHT: 293 LBS | DIASTOLIC BLOOD PRESSURE: 59 MMHG | RESPIRATION RATE: 20 BRPM | SYSTOLIC BLOOD PRESSURE: 157 MMHG

## 2018-04-04 VITALS
RESPIRATION RATE: 20 BRPM | DIASTOLIC BLOOD PRESSURE: 78 MMHG | HEIGHT: 65 IN | HEART RATE: 92 BPM | BODY MASS INDEX: 48.82 KG/M2 | SYSTOLIC BLOOD PRESSURE: 142 MMHG | OXYGEN SATURATION: 97 % | WEIGHT: 293 LBS | TEMPERATURE: 98.1 F

## 2018-04-04 DIAGNOSIS — D63.1 ANEMIA IN STAGE 3 CHRONIC KIDNEY DISEASE (HCC): Primary | ICD-10-CM

## 2018-04-04 DIAGNOSIS — D63.1 ANEMIA IN STAGE 3 CHRONIC KIDNEY DISEASE (HCC): ICD-10-CM

## 2018-04-04 DIAGNOSIS — N18.30 ANEMIA IN STAGE 3 CHRONIC KIDNEY DISEASE (HCC): ICD-10-CM

## 2018-04-04 DIAGNOSIS — N18.30 ANEMIA IN STAGE 3 CHRONIC KIDNEY DISEASE (HCC): Primary | ICD-10-CM

## 2018-04-04 LAB
ALBUMIN SERPL-MCNC: 3.9 G/DL (ref 3.5–5)
ALBUMIN/GLOB SERPL: 1.2 G/DL (ref 1.1–2.5)
ALP SERPL-CCNC: 70 U/L (ref 24–120)
ALT SERPL W P-5'-P-CCNC: 24 U/L (ref 0–54)
ANION GAP SERPL CALCULATED.3IONS-SCNC: 12 MMOL/L (ref 4–13)
AST SERPL-CCNC: 21 U/L (ref 7–45)
BASOPHILS # BLD AUTO: 0.05 10*3/MM3 (ref 0–0.2)
BASOPHILS NFR BLD AUTO: 0.4 % (ref 0–2)
BILIRUB SERPL-MCNC: 0.3 MG/DL (ref 0.1–1)
BUN BLD-MCNC: 40 MG/DL (ref 5–21)
BUN/CREAT SERPL: 19.4 (ref 7–25)
CALCIUM SPEC-SCNC: 9.5 MG/DL (ref 8.4–10.4)
CHLORIDE SERPL-SCNC: 103 MMOL/L (ref 98–110)
CO2 SERPL-SCNC: 25 MMOL/L (ref 24–31)
CREAT BLD-MCNC: 2.06 MG/DL (ref 0.5–1.4)
DEPRECATED RDW RBC AUTO: 56.3 FL (ref 40–54)
EOSINOPHIL # BLD AUTO: 0.19 10*3/MM3 (ref 0–0.7)
EOSINOPHIL NFR BLD AUTO: 1.6 % (ref 0–4)
ERYTHROCYTE [DISTWIDTH] IN BLOOD BY AUTOMATED COUNT: 16.9 % (ref 12–15)
GFR SERPL CREATININE-BSD FRML MDRD: 25 ML/MIN/1.73
GLOBULIN UR ELPH-MCNC: 3.3 GM/DL
GLUCOSE BLD-MCNC: 286 MG/DL (ref 70–100)
HCT VFR BLD AUTO: 32.8 % (ref 37–47)
HGB BLD-MCNC: 10.4 G/DL (ref 12–16)
HOLD SPECIMEN: NORMAL
HOLD SPECIMEN: NORMAL
IMM GRANULOCYTES # BLD: 0.1 10*3/MM3 (ref 0–0.03)
IMM GRANULOCYTES NFR BLD: 0.8 % (ref 0–5)
LYMPHOCYTES # BLD AUTO: 2 10*3/MM3 (ref 0.72–4.86)
LYMPHOCYTES NFR BLD AUTO: 16.5 % (ref 15–45)
MCH RBC QN AUTO: 29.1 PG (ref 28–32)
MCHC RBC AUTO-ENTMCNC: 31.7 G/DL (ref 33–36)
MCV RBC AUTO: 91.6 FL (ref 82–98)
MONOCYTES # BLD AUTO: 0.56 10*3/MM3 (ref 0.19–1.3)
MONOCYTES NFR BLD AUTO: 4.6 % (ref 4–12)
NEUTROPHILS # BLD AUTO: 9.23 10*3/MM3 (ref 1.87–8.4)
NEUTROPHILS NFR BLD AUTO: 76.1 % (ref 39–78)
NRBC BLD MANUAL-RTO: 0 /100 WBC (ref 0–0)
PLATELET # BLD AUTO: 230 10*3/MM3 (ref 130–400)
PMV BLD AUTO: 10.4 FL (ref 6–12)
POTASSIUM BLD-SCNC: 4.4 MMOL/L (ref 3.5–5.3)
PROT SERPL-MCNC: 7.2 G/DL (ref 6.3–8.7)
RBC # BLD AUTO: 3.58 10*6/MM3 (ref 4.2–5.4)
SODIUM BLD-SCNC: 140 MMOL/L (ref 135–145)
WBC NRBC COR # BLD: 12.13 10*3/MM3 (ref 4.8–10.8)

## 2018-04-04 PROCEDURE — 96372 THER/PROPH/DIAG INJ SC/IM: CPT

## 2018-04-04 PROCEDURE — 85025 COMPLETE CBC W/AUTO DIFF WBC: CPT | Performed by: INTERNAL MEDICINE

## 2018-04-04 PROCEDURE — 99213 OFFICE O/P EST LOW 20 MIN: CPT | Performed by: INTERNAL MEDICINE

## 2018-04-04 PROCEDURE — 36415 COLL VENOUS BLD VENIPUNCTURE: CPT

## 2018-04-04 PROCEDURE — 63510000001 EPOETIN ALFA PER 1000 UNITS: Performed by: INTERNAL MEDICINE

## 2018-04-04 PROCEDURE — 80053 COMPREHEN METABOLIC PANEL: CPT | Performed by: INTERNAL MEDICINE

## 2018-04-04 RX ORDER — LEVOTHYROXINE SODIUM 0.05 MG/1
50 TABLET ORAL DAILY
Qty: 90 TABLET | Refills: 1 | Status: SHIPPED | OUTPATIENT
Start: 2018-04-04 | End: 2019-04-02 | Stop reason: SDUPTHER

## 2018-04-04 RX ADMIN — ERYTHROPOIETIN 40000 UNITS: 40000 INJECTION, SOLUTION INTRAVENOUS; SUBCUTANEOUS at 14:43

## 2018-04-04 NOTE — PROGRESS NOTES
[facility]  HEMATOLOGY & ONCOLOGY        Subjective     VISIT DIAGNOSIS:   Encounter Diagnosis   Name Primary?   • Anemia in stage 3 chronic kidney disease        REASON FOR VISIT:     No chief complaint on file.       HEMATOLOGY / ONCOLOGY HISTORY:Ms. Shrestha is a 55 year old female with past medical history of hypertension, chronic kidney disease, diabetes insulindependent  type 2   No history exists.     [No treatment plan]  Cancer Staging Information:  No matching staging information was found for the patient.      INTERVAL HISTORY  Patient ID: Maria C Shrestha is a 57 y.o. year old female   Anemia secondary to chronic kidney disease, Stage III. Patient has not required VALERIE therapy this far.  2. Iron deficiency in the setting of chronic kidney disease, has benefitted from Feraheme. Had Feraheme in January, hemoglobin has  now near normalized at 10.7        Review of Systems   Constitutional: Negative.    HENT: Negative.    Eyes: Negative.    Respiratory: Negative.    Cardiovascular: Negative.    Gastrointestinal: Negative.    Endocrine: Negative.    Genitourinary: Negative.    Musculoskeletal: Negative.    Skin: Negative.    Allergic/Immunologic: Negative.    Neurological: Negative.    Hematological: Negative.    Psychiatric/Behavioral: Negative.             Medications:    Current Outpatient Prescriptions   Medication Sig Dispense Refill   • allopurinol (ZYLOPRIM) 100 MG tablet Take 100 mg by mouth 2 (Two) Times a Day.     • aspirin 81 MG tablet Take 81 mg by mouth Daily.     • B-D ULTRA-FINE 33 LANCETS misc Use 4 x daily 120 each 11   • busPIRone (BUSPAR) 10 MG tablet Take 10 mg by mouth 2 (Two) Times a Day.     • calcitriol (ROCALTROL) 0.25 MCG capsule Take 0.25 mcg by mouth 3 (Three) Times a Week.     • Calcium Carb-Cholecalciferol (CALCIUM-VITAMIN D3) 600-400 MG-UNIT tablet Take 1 tablet by mouth 2 (Two) Times a Day.     • carvedilol (COREG) 3.125 MG tablet Take 3.125 mg by mouth 2 (Two) Times  "a Day.     • cetirizine (zyrTEC) 10 MG tablet Take 10 mg by mouth As Needed.     • cholecalciferol (VITAMIN D3) 98684 UNITS capsule Take 50,000 Units by mouth Every 7 (Seven) Days.     • citalopram (CeleXA) 20 MG tablet Take 20 mg by mouth Daily.     • fluticasone (VERAMYST) 27.5 MCG/SPRAY nasal spray 2 sprays into each nostril Daily.     • furosemide (LASIX) 40 MG tablet Take 1 tablet by mouth Daily. (Patient taking differently: Take 40 mg by mouth As Needed.) 90 tablet 3   • Glucose Blood (BLOOD GLUCOSE TEST) strip Use 4 x daily, use any brand covered by insurance or same brand as before 360 each 3   • HUMULIN R 500 UNIT/ML CONCENTRATED injection USING A U100 SYRINGE DRAW UP TO THE DIRECTED UNIT ROXI AND INJECT FOUR TIMES A DAY (UP TO 30 UNIT ROXI FOUR TIMES A DAY) 100 mL 3   • insulin glargine (LANTUS) 100 UNIT/ML injection 200 units daily 18 each 3   • insulin regular (HUMULIN R) 500 UNIT/ML CONCENTRATED injection Inject 10 Units under the skin 3 (Three) Times a Day Before Meals.     • Insulin Syringe 31G X 5/16\" 1 ML misc 4 x daily 120 each 11   • Insulin Syringe-Needle U-100 31G X 5/16\" 0.3 ML misc use as indicated 4 times daily. 120 each 11   • lamoTRIgine (LaMICtal) 50 MG tablet dispersible disintegrating tablet Take 50 mg by mouth Daily.     • levothyroxine (SYNTHROID, LEVOTHROID) 50 MCG tablet Take 1 tablet by mouth Daily. 90 tablet 1   • lisinopril (PRINIVIL,ZESTRIL) 20 MG tablet Take 20 mg by mouth 2 (Two) Times a Day.     • Multiple Vitamins-Minerals (MULTIVITAMIN ADULT PO) Take 1 tablet by mouth Daily.     • potassium gluconate 595 MG tablet tablet Take 595 mg by mouth 2 (Two) Times a Day.     • rosuvastatin (CRESTOR) 10 MG tablet Take 10 mg by mouth Daily.     • sodium bicarbonate 650 MG tablet Take 650 mg by mouth 3 (Three) Times a Day.     • TRULICITY 1.5 MG/0.5ML solution pen-injector INJECT 1.5 MG UNDER THE SKIN EVERY 7 DAYS 6 mL 3     No current facility-administered medications for this visit.  "       ALLERGIES:    Allergies   Allergen Reactions   • Codeine Nausea Only       Objective      @VITALS    Current Status 4/4/2018   ECOG score 2       General Appearance: Patient is awake, alert, oriented and in no acute distress. Patient is welldeveloped, wellnourished, and appears stated age.  HEENT: Normocephalic. Sclerae clear, conjunctiva pink, extraocular movements intact, pupils, round, reactive to light and  accommodation. Mouth and throat are clear with moist oral mucosa.  NECK: Supple, no jugular venous distention, thyroid not enlarged.  LYMPH: No cervical, supraclavicular, axillary, or inguinal lymphadenopathy.  CHEST: Equal bilateral expansion, AP  diameter normal, resonant percussion note  LUNGS: Good air movement, no rales, rhonchi, rubs or wheezes with auscultation  CARDIO: Regular sinus rhythm, no murmurs, gallops or rubs.  ABDOMEN: Nondistended, soft, No tenderness, no guarding, no rebound, No hepatosplenomegaly. No abdominal masses. Bowel sounds positive. No hernia  GENITALIA: Not examined.  BREASTS: Not examined.  MUSKEL: No joint swelling, decreased motion, or inflammation  EXTREMS: No edema, clubbing, cyanosis, No varicose veins.  NEURO: Grossly nonfocal, Gait is coordinated and smooth, Cognition is preserved.  SKIN: No rashes, no ecchymoses, no petechia.  PSYCH: Oriented to time, place and person. Memory is preserved. Mood and affect appear normal      RECENT LABS:  Lab on 04/04/2018   Component Date Value Ref Range Status   • Glucose 04/04/2018 286* 70 - 100 mg/dL Final   • BUN 04/04/2018 40* 5 - 21 mg/dL Final   • Creatinine 04/04/2018 2.06* 0.50 - 1.40 mg/dL Final   • Sodium 04/04/2018 140  135 - 145 mmol/L Final   • Potassium 04/04/2018 4.4  3.5 - 5.3 mmol/L Final   • Chloride 04/04/2018 103  98 - 110 mmol/L Final   • CO2 04/04/2018 25.0  24.0 - 31.0 mmol/L Final   • Calcium 04/04/2018 9.5  8.4 - 10.4 mg/dL Final   • Total Protein 04/04/2018 7.2  6.3 - 8.7 g/dL Final   • Albumin 04/04/2018  3.90  3.50 - 5.00 g/dL Final   • ALT (SGPT) 04/04/2018 24  0 - 54 U/L Final   • AST (SGOT) 04/04/2018 21  7 - 45 U/L Final   • Alkaline Phosphatase 04/04/2018 70  24 - 120 U/L Final   • Total Bilirubin 04/04/2018 0.3  0.1 - 1.0 mg/dL Final   • eGFR Non African Amer 04/04/2018 25* >60 mL/min/1.73 Final   • Globulin 04/04/2018 3.3  gm/dL Final   • A/G Ratio 04/04/2018 1.2  1.1 - 2.5 g/dL Final   • BUN/Creatinine Ratio 04/04/2018 19.4  7.0 - 25.0 Final   • Anion Gap 04/04/2018 12.0  4.0 - 13.0 mmol/L Final   • WBC 04/04/2018 12.13* 4.80 - 10.80 10*3/mm3 Final   • RBC 04/04/2018 3.58* 4.20 - 5.40 10*6/mm3 Final   • Hemoglobin 04/04/2018 10.4* 12.0 - 16.0 g/dL Final   • Hematocrit 04/04/2018 32.8* 37.0 - 47.0 % Final   • MCV 04/04/2018 91.6  82.0 - 98.0 fL Final   • MCH 04/04/2018 29.1  28.0 - 32.0 pg Final   • MCHC 04/04/2018 31.7* 33.0 - 36.0 g/dL Final   • RDW 04/04/2018 16.9* 12.0 - 15.0 % Final   • RDW-SD 04/04/2018 56.3* 40.0 - 54.0 fl Final   • MPV 04/04/2018 10.4  6.0 - 12.0 fL Final   • Platelets 04/04/2018 230  130 - 400 10*3/mm3 Final   • Neutrophil % 04/04/2018 76.1  39.0 - 78.0 % Final   • Lymphocyte % 04/04/2018 16.5  15.0 - 45.0 % Final   • Monocyte % 04/04/2018 4.6  4.0 - 12.0 % Final   • Eosinophil % 04/04/2018 1.6  0.0 - 4.0 % Final   • Basophil % 04/04/2018 0.4  0.0 - 2.0 % Final   • Immature Grans % 04/04/2018 0.8  0.0 - 5.0 % Final   • Neutrophils, Absolute 04/04/2018 9.23* 1.87 - 8.40 10*3/mm3 Final   • Lymphocytes, Absolute 04/04/2018 2.00  0.72 - 4.86 10*3/mm3 Final   • Monocytes, Absolute 04/04/2018 0.56  0.19 - 1.30 10*3/mm3 Final   • Eosinophils, Absolute 04/04/2018 0.19  0.00 - 0.70 10*3/mm3 Final   • Basophils, Absolute 04/04/2018 0.05  0.00 - 0.20 10*3/mm3 Final   • Immature Grans, Absolute 04/04/2018 0.10* 0.00 - 0.03 10*3/mm3 Final   • nRBC 04/04/2018 0.0  0.0 - 0.0 /100 WBC Final       RADIOLOGY:  No results found.         Assessment/Plan        Anemia secondary to chronic kidney disease,  Stage III. Patient has not required VALERIE therapy this far.  2. Iron deficiency in the setting of chronic kidney disease, has benefitted from Feraheme. Had Feraheme in January, hemoglobin has  now near normalized at 10.3.  Plan continue watchful waiting return to clinic in 3 months time and check another CBC and iron studies.  If the iron is still low I may give additional intravenous iron.  Her CMS guidelines I cannot give her Procrit unless hemoglobin is less than 10.    Clinically patient asymptomatic.  I will redraw her iron studies today.  Hemoglobin is a 10.  Return to clinic in 3 months time.            Marco A Melo MD    4/4/2018    2:12 PM             [facility]  HEMATOLOGY & ONCOLOGY        Subjective     VISIT DIAGNOSIS:   Encounter Diagnosis   Name Primary?   • Anemia in stage 3 chronic kidney disease        REASON FOR VISIT:     No chief complaint on file.       HEMATOLOGY / ONCOLOGY HISTORY:Ms. Shrestha is a 55 year old female with past medical history of hypertension, chronic kidney disease, diabetes insulindependent  type 2   No history exists.     [No treatment plan]  Cancer Staging Information:  No matching staging information was found for the patient.      INTERVAL HISTORY  Patient ID: Maria C Shrestha is a 57 y.o. year old female   Anemia secondary to chronic kidney disease, Stage III. Patient has not required VALERIE therapy this far.  2. Iron deficiency in the setting of chronic kidney disease, has benefitted from Feraheme. Had Feraheme in January, hemoglobin has  now near normalized at 10.7        Review of Systems   Constitutional: Negative.    HENT: Negative.    Eyes: Negative.    Respiratory: Negative.    Cardiovascular: Negative.    Gastrointestinal: Negative.    Endocrine: Negative.    Genitourinary: Negative.    Musculoskeletal: Negative.    Skin: Negative.    Allergic/Immunologic: Negative.    Neurological: Negative.    Hematological: Negative.    Psychiatric/Behavioral:  "Negative.             Medications:    Current Outpatient Prescriptions   Medication Sig Dispense Refill   • allopurinol (ZYLOPRIM) 100 MG tablet Take 100 mg by mouth 2 (Two) Times a Day.     • aspirin 81 MG tablet Take 81 mg by mouth Daily.     • B-D ULTRA-FINE 33 LANCETS misc Use 4 x daily 120 each 11   • busPIRone (BUSPAR) 10 MG tablet Take 10 mg by mouth 2 (Two) Times a Day.     • calcitriol (ROCALTROL) 0.25 MCG capsule Take 0.25 mcg by mouth 3 (Three) Times a Week.     • Calcium Carb-Cholecalciferol (CALCIUM-VITAMIN D3) 600-400 MG-UNIT tablet Take 1 tablet by mouth 2 (Two) Times a Day.     • carvedilol (COREG) 3.125 MG tablet Take 3.125 mg by mouth 2 (Two) Times a Day.     • cetirizine (zyrTEC) 10 MG tablet Take 10 mg by mouth As Needed.     • cholecalciferol (VITAMIN D3) 96976 UNITS capsule Take 50,000 Units by mouth Every 7 (Seven) Days.     • citalopram (CeleXA) 20 MG tablet Take 20 mg by mouth Daily.     • fluticasone (VERAMYST) 27.5 MCG/SPRAY nasal spray 2 sprays into each nostril Daily.     • furosemide (LASIX) 40 MG tablet Take 1 tablet by mouth Daily. (Patient taking differently: Take 40 mg by mouth As Needed.) 90 tablet 3   • Glucose Blood (BLOOD GLUCOSE TEST) strip Use 4 x daily, use any brand covered by insurance or same brand as before 360 each 3   • HUMULIN R 500 UNIT/ML CONCENTRATED injection USING A U100 SYRINGE DRAW UP TO THE DIRECTED UNIT ROXI AND INJECT FOUR TIMES A DAY (UP TO 30 UNIT ROXI FOUR TIMES A DAY) 100 mL 3   • insulin glargine (LANTUS) 100 UNIT/ML injection 200 units daily 18 each 3   • insulin regular (HUMULIN R) 500 UNIT/ML CONCENTRATED injection Inject 10 Units under the skin 3 (Three) Times a Day Before Meals.     • Insulin Syringe 31G X 5/16\" 1 ML misc 4 x daily 120 each 11   • Insulin Syringe-Needle U-100 31G X 5/16\" 0.3 ML misc use as indicated 4 times daily. 120 each 11   • lamoTRIgine (LaMICtal) 50 MG tablet dispersible disintegrating tablet Take 50 mg by mouth Daily.     • " levothyroxine (SYNTHROID, LEVOTHROID) 50 MCG tablet Take 1 tablet by mouth Daily. 90 tablet 1   • lisinopril (PRINIVIL,ZESTRIL) 20 MG tablet Take 20 mg by mouth 2 (Two) Times a Day.     • Multiple Vitamins-Minerals (MULTIVITAMIN ADULT PO) Take 1 tablet by mouth Daily.     • potassium gluconate 595 MG tablet tablet Take 595 mg by mouth 2 (Two) Times a Day.     • rosuvastatin (CRESTOR) 10 MG tablet Take 10 mg by mouth Daily.     • sodium bicarbonate 650 MG tablet Take 650 mg by mouth 3 (Three) Times a Day.     • TRULICITY 1.5 MG/0.5ML solution pen-injector INJECT 1.5 MG UNDER THE SKIN EVERY 7 DAYS 6 mL 3     No current facility-administered medications for this visit.        ALLERGIES:    Allergies   Allergen Reactions   • Codeine Nausea Only       Objective      @VITALS    Current Status 4/4/2018   ECOG score 2       General Appearance: Patient is awake, alert, oriented and in no acute distress. Patient is welldeveloped, wellnourished, and appears stated age.  HEENT: Normocephalic. Sclerae clear, conjunctiva pink, extraocular movements intact, pupils, round, reactive to light and  accommodation. Mouth and throat are clear with moist oral mucosa.  NECK: Supple, no jugular venous distention, thyroid not enlarged.  LYMPH: No cervical, supraclavicular, axillary, or inguinal lymphadenopathy.  CHEST: Equal bilateral expansion, AP  diameter normal, resonant percussion note  LUNGS: Good air movement, no rales, rhonchi, rubs or wheezes with auscultation  CARDIO: Regular sinus rhythm, no murmurs, gallops or rubs.  ABDOMEN: Nondistended, soft, No tenderness, no guarding, no rebound, No hepatosplenomegaly. No abdominal masses. Bowel sounds positive. No hernia  GENITALIA: Not examined.  BREASTS: Not examined.  MUSKEL: No joint swelling, decreased motion, or inflammation  EXTREMS: No edema, clubbing, cyanosis, No varicose veins.  NEURO: Grossly nonfocal, Gait is coordinated and smooth, Cognition is preserved.  SKIN: No rashes, no  ecchymoses, no petechia.  PSYCH: Oriented to time, place and person. Memory is preserved. Mood and affect appear normal      RECENT LABS:  Lab on 04/04/2018   Component Date Value Ref Range Status   • Glucose 04/04/2018 286* 70 - 100 mg/dL Final   • BUN 04/04/2018 40* 5 - 21 mg/dL Final   • Creatinine 04/04/2018 2.06* 0.50 - 1.40 mg/dL Final   • Sodium 04/04/2018 140  135 - 145 mmol/L Final   • Potassium 04/04/2018 4.4  3.5 - 5.3 mmol/L Final   • Chloride 04/04/2018 103  98 - 110 mmol/L Final   • CO2 04/04/2018 25.0  24.0 - 31.0 mmol/L Final   • Calcium 04/04/2018 9.5  8.4 - 10.4 mg/dL Final   • Total Protein 04/04/2018 7.2  6.3 - 8.7 g/dL Final   • Albumin 04/04/2018 3.90  3.50 - 5.00 g/dL Final   • ALT (SGPT) 04/04/2018 24  0 - 54 U/L Final   • AST (SGOT) 04/04/2018 21  7 - 45 U/L Final   • Alkaline Phosphatase 04/04/2018 70  24 - 120 U/L Final   • Total Bilirubin 04/04/2018 0.3  0.1 - 1.0 mg/dL Final   • eGFR Non African Amer 04/04/2018 25* >60 mL/min/1.73 Final   • Globulin 04/04/2018 3.3  gm/dL Final   • A/G Ratio 04/04/2018 1.2  1.1 - 2.5 g/dL Final   • BUN/Creatinine Ratio 04/04/2018 19.4  7.0 - 25.0 Final   • Anion Gap 04/04/2018 12.0  4.0 - 13.0 mmol/L Final   • WBC 04/04/2018 12.13* 4.80 - 10.80 10*3/mm3 Final   • RBC 04/04/2018 3.58* 4.20 - 5.40 10*6/mm3 Final   • Hemoglobin 04/04/2018 10.4* 12.0 - 16.0 g/dL Final   • Hematocrit 04/04/2018 32.8* 37.0 - 47.0 % Final   • MCV 04/04/2018 91.6  82.0 - 98.0 fL Final   • MCH 04/04/2018 29.1  28.0 - 32.0 pg Final   • MCHC 04/04/2018 31.7* 33.0 - 36.0 g/dL Final   • RDW 04/04/2018 16.9* 12.0 - 15.0 % Final   • RDW-SD 04/04/2018 56.3* 40.0 - 54.0 fl Final   • MPV 04/04/2018 10.4  6.0 - 12.0 fL Final   • Platelets 04/04/2018 230  130 - 400 10*3/mm3 Final   • Neutrophil % 04/04/2018 76.1  39.0 - 78.0 % Final   • Lymphocyte % 04/04/2018 16.5  15.0 - 45.0 % Final   • Monocyte % 04/04/2018 4.6  4.0 - 12.0 % Final   • Eosinophil % 04/04/2018 1.6  0.0 - 4.0 % Final   •  Basophil % 04/04/2018 0.4  0.0 - 2.0 % Final   • Immature Grans % 04/04/2018 0.8  0.0 - 5.0 % Final   • Neutrophils, Absolute 04/04/2018 9.23* 1.87 - 8.40 10*3/mm3 Final   • Lymphocytes, Absolute 04/04/2018 2.00  0.72 - 4.86 10*3/mm3 Final   • Monocytes, Absolute 04/04/2018 0.56  0.19 - 1.30 10*3/mm3 Final   • Eosinophils, Absolute 04/04/2018 0.19  0.00 - 0.70 10*3/mm3 Final   • Basophils, Absolute 04/04/2018 0.05  0.00 - 0.20 10*3/mm3 Final   • Immature Grans, Absolute 04/04/2018 0.10* 0.00 - 0.03 10*3/mm3 Final   • nRBC 04/04/2018 0.0  0.0 - 0.0 /100 WBC Final       RADIOLOGY:  No results found.         Assessment/Plan        Anemia secondary to chronic kidney disease, Stage III. Patient has not required VALERIE therapy this far.  2. Iron deficiency in the setting of chronic kidney disease, has benefitted from Feraheme. Had Feraheme in January, hemoglobin has  now near normalized at 10.2.  Plan continue watchful waiting return to clinic in 3 months time and check another CBC and iron studies.  If the iron is still low I may give additional intravenous iron.  .    Clinically patient asymptomatic.  I will redraw her iron studies today.  Hemoglobin is a 10.3gm Today. Per CMS guidelines Rx Procrit 40,000 u s/q every month.       Marco A Melo MD    4/4/2018    2:12 PM             [facility]  HEMATOLOGY & ONCOLOGY        Subjective     VISIT DIAGNOSIS:   Encounter Diagnosis   Name Primary?   • Anemia in stage 3 chronic kidney disease        REASON FOR VISIT:     No chief complaint on file.       HEMATOLOGY / ONCOLOGY HISTORY:Ms. Shrestha is a 55 year old female with past medical history of hypertension, chronic kidney disease, diabetes insulindependent  type 2   No history exists.     [No treatment plan]  Cancer Staging Information:  No matching staging information was found for the patient.      INTERVAL HISTORY  Patient ID: Maria C Shrestha is a 57 y.o. year old female   Anemia secondary to chronic kidney  disease, Stage III. Patient has not required VALERIE therapy this far.  2. Iron deficiency in the setting of chronic kidney disease, has benefitted from Feraheme. Had Feraheme in January, hemoglobin has  now near normalized at 10.7        Review of Systems   Constitutional: Negative.    HENT: Negative.    Eyes: Negative.    Respiratory: Negative.    Cardiovascular: Negative.    Gastrointestinal: Negative.    Endocrine: Negative.    Genitourinary: Negative.    Musculoskeletal: Negative.    Skin: Negative.    Allergic/Immunologic: Negative.    Neurological: Negative.    Hematological: Negative.    Psychiatric/Behavioral: Negative.             Medications:    Current Outpatient Prescriptions   Medication Sig Dispense Refill   • allopurinol (ZYLOPRIM) 100 MG tablet Take 100 mg by mouth 2 (Two) Times a Day.     • aspirin 81 MG tablet Take 81 mg by mouth Daily.     • B-D ULTRA-FINE 33 LANCETS misc Use 4 x daily 120 each 11   • busPIRone (BUSPAR) 10 MG tablet Take 10 mg by mouth 2 (Two) Times a Day.     • calcitriol (ROCALTROL) 0.25 MCG capsule Take 0.25 mcg by mouth 3 (Three) Times a Week.     • Calcium Carb-Cholecalciferol (CALCIUM-VITAMIN D3) 600-400 MG-UNIT tablet Take 1 tablet by mouth 2 (Two) Times a Day.     • carvedilol (COREG) 3.125 MG tablet Take 3.125 mg by mouth 2 (Two) Times a Day.     • cetirizine (zyrTEC) 10 MG tablet Take 10 mg by mouth As Needed.     • cholecalciferol (VITAMIN D3) 95155 UNITS capsule Take 50,000 Units by mouth Every 7 (Seven) Days.     • citalopram (CeleXA) 20 MG tablet Take 20 mg by mouth Daily.     • fluticasone (VERAMYST) 27.5 MCG/SPRAY nasal spray 2 sprays into each nostril Daily.     • furosemide (LASIX) 40 MG tablet Take 1 tablet by mouth Daily. (Patient taking differently: Take 40 mg by mouth As Needed.) 90 tablet 3   • Glucose Blood (BLOOD GLUCOSE TEST) strip Use 4 x daily, use any brand covered by insurance or same brand as before 360 each 3   • HUMULIN R 500 UNIT/ML CONCENTRATED  "injection USING A U100 SYRINGE DRAW UP TO THE DIRECTED UNIT ROXI AND INJECT FOUR TIMES A DAY (UP TO 30 UNIT ROXI FOUR TIMES A DAY) 100 mL 3   • insulin glargine (LANTUS) 100 UNIT/ML injection 200 units daily 18 each 3   • insulin regular (HUMULIN R) 500 UNIT/ML CONCENTRATED injection Inject 10 Units under the skin 3 (Three) Times a Day Before Meals.     • Insulin Syringe 31G X 5/16\" 1 ML misc 4 x daily 120 each 11   • Insulin Syringe-Needle U-100 31G X 5/16\" 0.3 ML misc use as indicated 4 times daily. 120 each 11   • lamoTRIgine (LaMICtal) 50 MG tablet dispersible disintegrating tablet Take 50 mg by mouth Daily.     • levothyroxine (SYNTHROID, LEVOTHROID) 50 MCG tablet Take 1 tablet by mouth Daily. 90 tablet 1   • lisinopril (PRINIVIL,ZESTRIL) 20 MG tablet Take 20 mg by mouth 2 (Two) Times a Day.     • Multiple Vitamins-Minerals (MULTIVITAMIN ADULT PO) Take 1 tablet by mouth Daily.     • potassium gluconate 595 MG tablet tablet Take 595 mg by mouth 2 (Two) Times a Day.     • rosuvastatin (CRESTOR) 10 MG tablet Take 10 mg by mouth Daily.     • sodium bicarbonate 650 MG tablet Take 650 mg by mouth 3 (Three) Times a Day.     • TRULICITY 1.5 MG/0.5ML solution pen-injector INJECT 1.5 MG UNDER THE SKIN EVERY 7 DAYS 6 mL 3     No current facility-administered medications for this visit.        ALLERGIES:    Allergies   Allergen Reactions   • Codeine Nausea Only       Objective      @VITALS    Current Status 4/4/2018   ECOG score 2       General Appearance: Patient is awake, alert, oriented and in no acute distress. Patient is welldeveloped, wellnourished, and appears stated age.  HEENT: Normocephalic. Sclerae clear, conjunctiva pink, extraocular movements intact, pupils, round, reactive to light and  accommodation. Mouth and throat are clear with moist oral mucosa.  NECK: Supple, no jugular venous distention, thyroid not enlarged.  LYMPH: No cervical, supraclavicular, axillary, or inguinal lymphadenopathy.  CHEST: Equal " bilateral expansion, AP  diameter normal, resonant percussion note  LUNGS: Good air movement, no rales, rhonchi, rubs or wheezes with auscultation  CARDIO: Regular sinus rhythm, no murmurs, gallops or rubs.  ABDOMEN: Nondistended, soft, No tenderness, no guarding, no rebound, No hepatosplenomegaly. No abdominal masses. Bowel sounds positive. No hernia  GENITALIA: Not examined.  BREASTS: Not examined.  MUSKEL: No joint swelling, decreased motion, or inflammation  EXTREMS: No edema, clubbing, cyanosis, No varicose veins.  NEURO: Grossly nonfocal, Gait is coordinated and smooth, Cognition is preserved.  SKIN: No rashes, no ecchymoses, no petechia.  PSYCH: Oriented to time, place and person. Memory is preserved. Mood and affect appear normal      RECENT LABS:  Lab on 04/04/2018   Component Date Value Ref Range Status   • Glucose 04/04/2018 286* 70 - 100 mg/dL Final   • BUN 04/04/2018 40* 5 - 21 mg/dL Final   • Creatinine 04/04/2018 2.06* 0.50 - 1.40 mg/dL Final   • Sodium 04/04/2018 140  135 - 145 mmol/L Final   • Potassium 04/04/2018 4.4  3.5 - 5.3 mmol/L Final   • Chloride 04/04/2018 103  98 - 110 mmol/L Final   • CO2 04/04/2018 25.0  24.0 - 31.0 mmol/L Final   • Calcium 04/04/2018 9.5  8.4 - 10.4 mg/dL Final   • Total Protein 04/04/2018 7.2  6.3 - 8.7 g/dL Final   • Albumin 04/04/2018 3.90  3.50 - 5.00 g/dL Final   • ALT (SGPT) 04/04/2018 24  0 - 54 U/L Final   • AST (SGOT) 04/04/2018 21  7 - 45 U/L Final   • Alkaline Phosphatase 04/04/2018 70  24 - 120 U/L Final   • Total Bilirubin 04/04/2018 0.3  0.1 - 1.0 mg/dL Final   • eGFR Non African Amer 04/04/2018 25* >60 mL/min/1.73 Final   • Globulin 04/04/2018 3.3  gm/dL Final   • A/G Ratio 04/04/2018 1.2  1.1 - 2.5 g/dL Final   • BUN/Creatinine Ratio 04/04/2018 19.4  7.0 - 25.0 Final   • Anion Gap 04/04/2018 12.0  4.0 - 13.0 mmol/L Final   • WBC 04/04/2018 12.13* 4.80 - 10.80 10*3/mm3 Final   • RBC 04/04/2018 3.58* 4.20 - 5.40 10*6/mm3 Final   • Hemoglobin 04/04/2018  10.4* 12.0 - 16.0 g/dL Final   • Hematocrit 04/04/2018 32.8* 37.0 - 47.0 % Final   • MCV 04/04/2018 91.6  82.0 - 98.0 fL Final   • MCH 04/04/2018 29.1  28.0 - 32.0 pg Final   • MCHC 04/04/2018 31.7* 33.0 - 36.0 g/dL Final   • RDW 04/04/2018 16.9* 12.0 - 15.0 % Final   • RDW-SD 04/04/2018 56.3* 40.0 - 54.0 fl Final   • MPV 04/04/2018 10.4  6.0 - 12.0 fL Final   • Platelets 04/04/2018 230  130 - 400 10*3/mm3 Final   • Neutrophil % 04/04/2018 76.1  39.0 - 78.0 % Final   • Lymphocyte % 04/04/2018 16.5  15.0 - 45.0 % Final   • Monocyte % 04/04/2018 4.6  4.0 - 12.0 % Final   • Eosinophil % 04/04/2018 1.6  0.0 - 4.0 % Final   • Basophil % 04/04/2018 0.4  0.0 - 2.0 % Final   • Immature Grans % 04/04/2018 0.8  0.0 - 5.0 % Final   • Neutrophils, Absolute 04/04/2018 9.23* 1.87 - 8.40 10*3/mm3 Final   • Lymphocytes, Absolute 04/04/2018 2.00  0.72 - 4.86 10*3/mm3 Final   • Monocytes, Absolute 04/04/2018 0.56  0.19 - 1.30 10*3/mm3 Final   • Eosinophils, Absolute 04/04/2018 0.19  0.00 - 0.70 10*3/mm3 Final   • Basophils, Absolute 04/04/2018 0.05  0.00 - 0.20 10*3/mm3 Final   • Immature Grans, Absolute 04/04/2018 0.10* 0.00 - 0.03 10*3/mm3 Final   • nRBC 04/04/2018 0.0  0.0 - 0.0 /100 WBC Final       RADIOLOGY:  No results found.         Assessment/Plan        Anemia secondary to chronic kidney disease, Stage III. Patient has not required VALERIE therapy this far.  2. Iron deficiency in the setting of chronic kidney disease, has benefitted from Feraheme. Had Feraheme in January, hemoglobin has  now near normalized at 10.3.  Plan continue watchful waiting return to clinic in 3 months time and check another CBC and iron studies.  If the iron is still low I may give additional intravenous iron.  Her CMS guidelines I cannot give her Procrit unless hemoglobin is less than 10.    Clinically patient asymptomatic.  I will redraw her iron studies today.  Hemoglobin is a 10.  Return to clinic in 3 months time.            Marco A Melo,  MD    4/4/2018    2:12 PM             [facility]  HEMATOLOGY & ONCOLOGY        Subjective     VISIT DIAGNOSIS:   Encounter Diagnosis   Name Primary?   • Anemia in stage 3 chronic kidney disease        REASON FOR VISIT:     No chief complaint on file.       HEMATOLOGY / ONCOLOGY HISTORY:Ms. Shrestha is a 55 year old female with past medical history of hypertension, chronic kidney disease, diabetes insulindependent  type 2   No history exists.     [No treatment plan]  Cancer Staging Information:  No matching staging information was found for the patient.      INTERVAL HISTORY  Patient ID: Maria C Shrestha is a 57 y.o. year old female   Anemia secondary to chronic kidney disease, Stage III. Patient has not required VALERIE therapy this far.  2. Iron deficiency in the setting of chronic kidney disease, has benefitted from Feraheme. Had Feraheme in January, hemoglobin has  now near normalized at 10.7        Review of Systems   Constitutional: Negative.    HENT: Negative.    Eyes: Negative.    Respiratory: Negative.    Cardiovascular: Negative.    Gastrointestinal: Negative.    Endocrine: Negative.    Genitourinary: Negative.    Musculoskeletal: Negative.    Skin: Negative.    Allergic/Immunologic: Negative.    Neurological: Negative.    Hematological: Negative.    Psychiatric/Behavioral: Negative.             Medications:    Current Outpatient Prescriptions   Medication Sig Dispense Refill   • allopurinol (ZYLOPRIM) 100 MG tablet Take 100 mg by mouth 2 (Two) Times a Day.     • aspirin 81 MG tablet Take 81 mg by mouth Daily.     • B-D ULTRA-FINE 33 LANCETS misc Use 4 x daily 120 each 11   • busPIRone (BUSPAR) 10 MG tablet Take 10 mg by mouth 2 (Two) Times a Day.     • calcitriol (ROCALTROL) 0.25 MCG capsule Take 0.25 mcg by mouth 3 (Three) Times a Week.     • Calcium Carb-Cholecalciferol (CALCIUM-VITAMIN D3) 600-400 MG-UNIT tablet Take 1 tablet by mouth 2 (Two) Times a Day.     • carvedilol (COREG) 3.125 MG tablet  "Take 3.125 mg by mouth 2 (Two) Times a Day.     • cetirizine (zyrTEC) 10 MG tablet Take 10 mg by mouth As Needed.     • cholecalciferol (VITAMIN D3) 24366 UNITS capsule Take 50,000 Units by mouth Every 7 (Seven) Days.     • citalopram (CeleXA) 20 MG tablet Take 20 mg by mouth Daily.     • fluticasone (VERAMYST) 27.5 MCG/SPRAY nasal spray 2 sprays into each nostril Daily.     • furosemide (LASIX) 40 MG tablet Take 1 tablet by mouth Daily. (Patient taking differently: Take 40 mg by mouth As Needed.) 90 tablet 3   • Glucose Blood (BLOOD GLUCOSE TEST) strip Use 4 x daily, use any brand covered by insurance or same brand as before 360 each 3   • HUMULIN R 500 UNIT/ML CONCENTRATED injection USING A U100 SYRINGE DRAW UP TO THE DIRECTED UNIT ROXI AND INJECT FOUR TIMES A DAY (UP TO 30 UNIT ROXI FOUR TIMES A DAY) 100 mL 3   • insulin glargine (LANTUS) 100 UNIT/ML injection 200 units daily 18 each 3   • insulin regular (HUMULIN R) 500 UNIT/ML CONCENTRATED injection Inject 10 Units under the skin 3 (Three) Times a Day Before Meals.     • Insulin Syringe 31G X 5/16\" 1 ML misc 4 x daily 120 each 11   • Insulin Syringe-Needle U-100 31G X 5/16\" 0.3 ML misc use as indicated 4 times daily. 120 each 11   • lamoTRIgine (LaMICtal) 50 MG tablet dispersible disintegrating tablet Take 50 mg by mouth Daily.     • levothyroxine (SYNTHROID, LEVOTHROID) 50 MCG tablet Take 1 tablet by mouth Daily. 90 tablet 1   • lisinopril (PRINIVIL,ZESTRIL) 20 MG tablet Take 20 mg by mouth 2 (Two) Times a Day.     • Multiple Vitamins-Minerals (MULTIVITAMIN ADULT PO) Take 1 tablet by mouth Daily.     • potassium gluconate 595 MG tablet tablet Take 595 mg by mouth 2 (Two) Times a Day.     • rosuvastatin (CRESTOR) 10 MG tablet Take 10 mg by mouth Daily.     • sodium bicarbonate 650 MG tablet Take 650 mg by mouth 3 (Three) Times a Day.     • TRULICITY 1.5 MG/0.5ML solution pen-injector INJECT 1.5 MG UNDER THE SKIN EVERY 7 DAYS 6 mL 3     No current " facility-administered medications for this visit.        ALLERGIES:    Allergies   Allergen Reactions   • Codeine Nausea Only       Objective      @VITALS    Current Status 4/4/2018   ECOG score 2       General Appearance: Patient is awake, alert, oriented and in no acute distress. Patient is welldeveloped, wellnourished, and appears stated age.  HEENT: Normocephalic. Sclerae clear, conjunctiva pink, extraocular movements intact, pupils, round, reactive to light and  accommodation. Mouth and throat are clear with moist oral mucosa.  NECK: Supple, no jugular venous distention, thyroid not enlarged.  LYMPH: No cervical, supraclavicular, axillary, or inguinal lymphadenopathy.  CHEST: Equal bilateral expansion, AP  diameter normal, resonant percussion note  LUNGS: Good air movement, no rales, rhonchi, rubs or wheezes with auscultation  CARDIO: Regular sinus rhythm, no murmurs, gallops or rubs.  ABDOMEN: Nondistended, soft, No tenderness, no guarding, no rebound, No hepatosplenomegaly. No abdominal masses. Bowel sounds positive. No hernia  GENITALIA: Not examined.  BREASTS: Not examined.  MUSKEL: No joint swelling, decreased motion, or inflammation  EXTREMS: No edema, clubbing, cyanosis, No varicose veins.  NEURO: Grossly nonfocal, Gait is coordinated and smooth, Cognition is preserved.  SKIN: No rashes, no ecchymoses, no petechia.  PSYCH: Oriented to time, place and person. Memory is preserved. Mood and affect appear normal      RECENT LABS:  Lab on 04/04/2018   Component Date Value Ref Range Status   • Glucose 04/04/2018 286* 70 - 100 mg/dL Final   • BUN 04/04/2018 40* 5 - 21 mg/dL Final   • Creatinine 04/04/2018 2.06* 0.50 - 1.40 mg/dL Final   • Sodium 04/04/2018 140  135 - 145 mmol/L Final   • Potassium 04/04/2018 4.4  3.5 - 5.3 mmol/L Final   • Chloride 04/04/2018 103  98 - 110 mmol/L Final   • CO2 04/04/2018 25.0  24.0 - 31.0 mmol/L Final   • Calcium 04/04/2018 9.5  8.4 - 10.4 mg/dL Final   • Total Protein  04/04/2018 7.2  6.3 - 8.7 g/dL Final   • Albumin 04/04/2018 3.90  3.50 - 5.00 g/dL Final   • ALT (SGPT) 04/04/2018 24  0 - 54 U/L Final   • AST (SGOT) 04/04/2018 21  7 - 45 U/L Final   • Alkaline Phosphatase 04/04/2018 70  24 - 120 U/L Final   • Total Bilirubin 04/04/2018 0.3  0.1 - 1.0 mg/dL Final   • eGFR Non African Amer 04/04/2018 25* >60 mL/min/1.73 Final   • Globulin 04/04/2018 3.3  gm/dL Final   • A/G Ratio 04/04/2018 1.2  1.1 - 2.5 g/dL Final   • BUN/Creatinine Ratio 04/04/2018 19.4  7.0 - 25.0 Final   • Anion Gap 04/04/2018 12.0  4.0 - 13.0 mmol/L Final   • WBC 04/04/2018 12.13* 4.80 - 10.80 10*3/mm3 Final   • RBC 04/04/2018 3.58* 4.20 - 5.40 10*6/mm3 Final   • Hemoglobin 04/04/2018 10.4* 12.0 - 16.0 g/dL Final   • Hematocrit 04/04/2018 32.8* 37.0 - 47.0 % Final   • MCV 04/04/2018 91.6  82.0 - 98.0 fL Final   • MCH 04/04/2018 29.1  28.0 - 32.0 pg Final   • MCHC 04/04/2018 31.7* 33.0 - 36.0 g/dL Final   • RDW 04/04/2018 16.9* 12.0 - 15.0 % Final   • RDW-SD 04/04/2018 56.3* 40.0 - 54.0 fl Final   • MPV 04/04/2018 10.4  6.0 - 12.0 fL Final   • Platelets 04/04/2018 230  130 - 400 10*3/mm3 Final   • Neutrophil % 04/04/2018 76.1  39.0 - 78.0 % Final   • Lymphocyte % 04/04/2018 16.5  15.0 - 45.0 % Final   • Monocyte % 04/04/2018 4.6  4.0 - 12.0 % Final   • Eosinophil % 04/04/2018 1.6  0.0 - 4.0 % Final   • Basophil % 04/04/2018 0.4  0.0 - 2.0 % Final   • Immature Grans % 04/04/2018 0.8  0.0 - 5.0 % Final   • Neutrophils, Absolute 04/04/2018 9.23* 1.87 - 8.40 10*3/mm3 Final   • Lymphocytes, Absolute 04/04/2018 2.00  0.72 - 4.86 10*3/mm3 Final   • Monocytes, Absolute 04/04/2018 0.56  0.19 - 1.30 10*3/mm3 Final   • Eosinophils, Absolute 04/04/2018 0.19  0.00 - 0.70 10*3/mm3 Final   • Basophils, Absolute 04/04/2018 0.05  0.00 - 0.20 10*3/mm3 Final   • Immature Grans, Absolute 04/04/2018 0.10* 0.00 - 0.03 10*3/mm3 Final   • nRBC 04/04/2018 0.0  0.0 - 0.0 /100 WBC Final       RADIOLOGY:  No results found.          Assessment/Plan        Anemia secondary to chronic kidney disease, Stage III. Patient has not required VALERIE therapy this far.  2. Iron deficiency in the setting of chronic kidney disease, has benefitted from Feraheme. Had Feraheme in January, hemoglobin has  now near normalized at 10.1.  Plan continue watchful waiting return to clinic in 3 months time and check another CBC and iron studies.  If the iron is still low I may give additional intravenous iron.  .    Clinically patient asymptomatic.  I will redraw her iron studies today.  Hemoglobin is a 10.2gm Today. Per CMS guidelines Rx Procrit 40,000 u s/q every month.       Marco A Melo MD    4/4/2018    2:12 PM

## 2018-04-04 NOTE — TELEPHONE ENCOUNTER
ILEANAROBERT STEVEN IS NEEDING REFILL ON THE LEVOTHYROXIN 50MCG TO BE SENT TO [facility] IN [facility]...THEIR # [phone]  HER # [phone]

## 2018-04-27 DIAGNOSIS — N18.30 ANEMIA IN STAGE 3 CHRONIC KIDNEY DISEASE (HCC): ICD-10-CM

## 2018-04-27 DIAGNOSIS — D63.1 ANEMIA IN STAGE 3 CHRONIC KIDNEY DISEASE (HCC): ICD-10-CM

## 2018-05-02 ENCOUNTER — INFUSION (OUTPATIENT)
Dept: ONCOLOGY | Facility: HOSPITAL | Age: 58
End: 2018-05-02
Attending: INTERNAL MEDICINE

## 2018-05-02 ENCOUNTER — LAB (OUTPATIENT)
Dept: LAB | Facility: HOSPITAL | Age: 58
End: 2018-05-02
Attending: INTERNAL MEDICINE

## 2018-05-02 ENCOUNTER — OFFICE VISIT (OUTPATIENT)
Dept: ONCOLOGY | Facility: CLINIC | Age: 58
End: 2018-05-02

## 2018-05-02 VITALS
SYSTOLIC BLOOD PRESSURE: 170 MMHG | OXYGEN SATURATION: 98 % | HEART RATE: 72 BPM | DIASTOLIC BLOOD PRESSURE: 59 MMHG | BODY MASS INDEX: 48.82 KG/M2 | HEIGHT: 65 IN | WEIGHT: 293 LBS | TEMPERATURE: 97.3 F | RESPIRATION RATE: 20 BRPM

## 2018-05-02 VITALS
DIASTOLIC BLOOD PRESSURE: 82 MMHG | HEART RATE: 80 BPM | WEIGHT: 293 LBS | BODY MASS INDEX: 48.82 KG/M2 | TEMPERATURE: 98.2 F | OXYGEN SATURATION: 94 % | RESPIRATION RATE: 18 BRPM | HEIGHT: 65 IN | SYSTOLIC BLOOD PRESSURE: 148 MMHG

## 2018-05-02 DIAGNOSIS — N18.30 ANEMIA IN STAGE 3 CHRONIC KIDNEY DISEASE (HCC): ICD-10-CM

## 2018-05-02 DIAGNOSIS — Z79.4 TYPE 2 DIABETES MELLITUS WITH STAGE 3 CHRONIC KIDNEY DISEASE, WITH LONG-TERM CURRENT USE OF INSULIN (HCC): Primary | ICD-10-CM

## 2018-05-02 DIAGNOSIS — D63.1 ANEMIA IN STAGE 3 CHRONIC KIDNEY DISEASE (HCC): ICD-10-CM

## 2018-05-02 DIAGNOSIS — N18.30 ANEMIA IN STAGE 3 CHRONIC KIDNEY DISEASE (HCC): Primary | ICD-10-CM

## 2018-05-02 DIAGNOSIS — E11.22 TYPE 2 DIABETES MELLITUS WITH STAGE 3 CHRONIC KIDNEY DISEASE, WITH LONG-TERM CURRENT USE OF INSULIN (HCC): Primary | ICD-10-CM

## 2018-05-02 DIAGNOSIS — D63.1 ANEMIA IN STAGE 3 CHRONIC KIDNEY DISEASE (HCC): Primary | ICD-10-CM

## 2018-05-02 DIAGNOSIS — N18.30 TYPE 2 DIABETES MELLITUS WITH STAGE 3 CHRONIC KIDNEY DISEASE, WITH LONG-TERM CURRENT USE OF INSULIN (HCC): Primary | ICD-10-CM

## 2018-05-02 LAB
ALBUMIN SERPL-MCNC: 3.8 G/DL (ref 3.5–5)
ALBUMIN/GLOB SERPL: 1.2 G/DL (ref 1.1–2.5)
ALP SERPL-CCNC: 76 U/L (ref 24–120)
ALT SERPL W P-5'-P-CCNC: 21 U/L (ref 0–54)
ANION GAP SERPL CALCULATED.3IONS-SCNC: 12 MMOL/L (ref 4–13)
AST SERPL-CCNC: 22 U/L (ref 7–45)
BASOPHILS # BLD AUTO: 0.05 10*3/MM3 (ref 0–0.2)
BASOPHILS NFR BLD AUTO: 0.5 % (ref 0–2)
BILIRUB SERPL-MCNC: 0.3 MG/DL (ref 0.1–1)
BUN BLD-MCNC: 42 MG/DL (ref 5–21)
BUN/CREAT SERPL: 21.5 (ref 7–25)
CALCIUM SPEC-SCNC: 9.9 MG/DL (ref 8.4–10.4)
CHLORIDE SERPL-SCNC: 104 MMOL/L (ref 98–110)
CO2 SERPL-SCNC: 27 MMOL/L (ref 24–31)
CREAT BLD-MCNC: 1.95 MG/DL (ref 0.5–1.4)
DEPRECATED RDW RBC AUTO: 56.6 FL (ref 40–54)
EOSINOPHIL # BLD AUTO: 0.27 10*3/MM3 (ref 0–0.7)
EOSINOPHIL NFR BLD AUTO: 2.6 % (ref 0–4)
ERYTHROCYTE [DISTWIDTH] IN BLOOD BY AUTOMATED COUNT: 17 % (ref 12–15)
GFR SERPL CREATININE-BSD FRML MDRD: 26 ML/MIN/1.73
GLOBULIN UR ELPH-MCNC: 3.1 GM/DL
GLUCOSE BLD-MCNC: 198 MG/DL (ref 70–100)
HCT VFR BLD AUTO: 31.5 % (ref 37–47)
HGB BLD-MCNC: 10.2 G/DL (ref 12–16)
HOLD SPECIMEN: NORMAL
HOLD SPECIMEN: NORMAL
IMM GRANULOCYTES # BLD: 0.1 10*3/MM3 (ref 0–0.03)
IMM GRANULOCYTES NFR BLD: 1 % (ref 0–5)
LYMPHOCYTES # BLD AUTO: 2.08 10*3/MM3 (ref 0.72–4.86)
LYMPHOCYTES NFR BLD AUTO: 20.2 % (ref 15–45)
MCH RBC QN AUTO: 29.7 PG (ref 28–32)
MCHC RBC AUTO-ENTMCNC: 32.4 G/DL (ref 33–36)
MCV RBC AUTO: 91.6 FL (ref 82–98)
MONOCYTES # BLD AUTO: 0.59 10*3/MM3 (ref 0.19–1.3)
MONOCYTES NFR BLD AUTO: 5.7 % (ref 4–12)
NEUTROPHILS # BLD AUTO: 7.19 10*3/MM3 (ref 1.87–8.4)
NEUTROPHILS NFR BLD AUTO: 70 % (ref 39–78)
NRBC BLD MANUAL-RTO: 0 /100 WBC (ref 0–0)
PLATELET # BLD AUTO: 231 10*3/MM3 (ref 130–400)
PMV BLD AUTO: 10.1 FL (ref 6–12)
POTASSIUM BLD-SCNC: 4.5 MMOL/L (ref 3.5–5.3)
PROT SERPL-MCNC: 6.9 G/DL (ref 6.3–8.7)
RBC # BLD AUTO: 3.44 10*6/MM3 (ref 4.2–5.4)
SODIUM BLD-SCNC: 143 MMOL/L (ref 135–145)
WBC NRBC COR # BLD: 10.28 10*3/MM3 (ref 4.8–10.8)

## 2018-05-02 PROCEDURE — 96401 CHEMO ANTI-NEOPL SQ/IM: CPT

## 2018-05-02 PROCEDURE — 96372 THER/PROPH/DIAG INJ SC/IM: CPT

## 2018-05-02 PROCEDURE — 85025 COMPLETE CBC W/AUTO DIFF WBC: CPT

## 2018-05-02 PROCEDURE — 63510000001 EPOETIN ALFA PER 1000 UNITS: Performed by: INTERNAL MEDICINE

## 2018-05-02 PROCEDURE — 36415 COLL VENOUS BLD VENIPUNCTURE: CPT

## 2018-05-02 PROCEDURE — 99214 OFFICE O/P EST MOD 30 MIN: CPT | Performed by: INTERNAL MEDICINE

## 2018-05-02 PROCEDURE — 80053 COMPREHEN METABOLIC PANEL: CPT

## 2018-05-02 RX ADMIN — ERYTHROPOIETIN 40000 UNITS: 40000 INJECTION, SOLUTION INTRAVENOUS; SUBCUTANEOUS at 14:08

## 2018-05-02 NOTE — PROGRESS NOTES
[facility]  HEMATOLOGY & ONCOLOGY        Subjective     VISIT DIAGNOSIS:   No diagnosis found.    REASON FOR VISIT:     No chief complaint on file.       HEMATOLOGY / ONCOLOGY HISTORY:Ms. Shrestha is a 55 year old female with past medical history of hypertension, chronic kidney disease, diabetes insulindependent  type 2   No history exists.     [No treatment plan]  Cancer Staging Information:  No matching staging information was found for the patient.      INTERVAL HISTORY  Patient ID: Maria C Shretsha is a 58 y.o. year old female   Anemia secondary to chronic kidney disease, Stage III. Patient has not required VALERIE therapy this far.  2. Iron deficiency in the setting of chronic kidney disease, has benefitted from Feraheme. Had Feraheme in January, hemoglobin has  now near normalized at 10.7        Review of Systems   Constitutional: Negative.    HENT: Negative.    Eyes: Negative.    Respiratory: Negative.    Cardiovascular: Negative.    Gastrointestinal: Negative.    Endocrine: Negative.    Genitourinary: Negative.    Musculoskeletal: Negative.    Skin: Negative.    Allergic/Immunologic: Negative.    Neurological: Negative.    Hematological: Negative.    Psychiatric/Behavioral: Negative.             Medications:    Current Outpatient Prescriptions   Medication Sig Dispense Refill   • allopurinol (ZYLOPRIM) 100 MG tablet Take 100 mg by mouth 2 (Two) Times a Day.     • aspirin 81 MG tablet Take 81 mg by mouth Daily.     • B-D ULTRA-FINE 33 LANCETS misc Use 4 x daily 120 each 11   • busPIRone (BUSPAR) 10 MG tablet Take 10 mg by mouth 2 (Two) Times a Day.     • calcitriol (ROCALTROL) 0.25 MCG capsule Take 0.25 mcg by mouth 3 (Three) Times a Week.     • Calcium Carb-Cholecalciferol (CALCIUM-VITAMIN D3) 600-400 MG-UNIT tablet Take 1 tablet by mouth 2 (Two) Times a Day.     • carvedilol (COREG) 3.125 MG tablet Take 3.125 mg by mouth 2 (Two) Times a Day.     • cetirizine (zyrTEC) 10 MG tablet Take 10 mg by mouth  "As Needed.     • cholecalciferol (VITAMIN D3) 72236 UNITS capsule Take 50,000 Units by mouth Every 7 (Seven) Days.     • citalopram (CeleXA) 20 MG tablet Take 20 mg by mouth Daily.     • fluticasone (VERAMYST) 27.5 MCG/SPRAY nasal spray 2 sprays into each nostril Daily.     • furosemide (LASIX) 40 MG tablet Take 1 tablet by mouth Daily. (Patient taking differently: Take 40 mg by mouth As Needed.) 90 tablet 3   • Glucose Blood (BLOOD GLUCOSE TEST) strip Use 4 x daily, use any brand covered by insurance or same brand as before 360 each 3   • HUMULIN R 500 UNIT/ML CONCENTRATED injection USING A U100 SYRINGE DRAW UP TO THE DIRECTED UNIT ROXI AND INJECT FOUR TIMES A DAY (UP TO 30 UNIT ROXI FOUR TIMES A DAY) 100 mL 3   • insulin glargine (LANTUS) 100 UNIT/ML injection 200 units daily 18 each 3   • insulin regular (HUMULIN R) 500 UNIT/ML CONCENTRATED injection Inject 10 Units under the skin 3 (Three) Times a Day Before Meals.     • Insulin Syringe 31G X 5/16\" 1 ML misc 4 x daily 120 each 11   • Insulin Syringe-Needle U-100 31G X 5/16\" 0.3 ML misc use as indicated 4 times daily. 120 each 11   • lamoTRIgine (LaMICtal) 50 MG tablet dispersible disintegrating tablet Take 50 mg by mouth Daily.     • levothyroxine (SYNTHROID, LEVOTHROID) 50 MCG tablet Take 1 tablet by mouth Daily. 90 tablet 1   • lisinopril (PRINIVIL,ZESTRIL) 20 MG tablet Take 20 mg by mouth 2 (Two) Times a Day.     • Multiple Vitamins-Minerals (MULTIVITAMIN ADULT PO) Take 1 tablet by mouth Daily.     • potassium gluconate 595 MG tablet tablet Take 595 mg by mouth 2 (Two) Times a Day.     • rosuvastatin (CRESTOR) 10 MG tablet Take 10 mg by mouth Daily.     • sodium bicarbonate 650 MG tablet Take 650 mg by mouth 3 (Three) Times a Day.     • TRULICITY 1.5 MG/0.5ML solution pen-injector INJECT 1.5 MG UNDER THE SKIN EVERY 7 DAYS 6 mL 3     No current facility-administered medications for this visit.        ALLERGIES:    Allergies   Allergen Reactions   • Codeine " Nausea Only       Objective      @VITALS    Current Status 5/2/2018   ECOG score 1       General Appearance: Patient is awake, alert, oriented and in no acute distress. Patient is welldeveloped, wellnourished, and appears stated age.  HEENT: Normocephalic. Sclerae clear, conjunctiva pink, extraocular movements intact, pupils, round, reactive to light and  accommodation. Mouth and throat are clear with moist oral mucosa.  NECK: Supple, no jugular venous distention, thyroid not enlarged.  LYMPH: No cervical, supraclavicular, axillary, or inguinal lymphadenopathy.  CHEST: Equal bilateral expansion, AP  diameter normal, resonant percussion note  LUNGS: Good air movement, no rales, rhonchi, rubs or wheezes with auscultation  CARDIO: Regular sinus rhythm, no murmurs, gallops or rubs.  ABDOMEN: Nondistended, soft, No tenderness, no guarding, no rebound, No hepatosplenomegaly. No abdominal masses. Bowel sounds positive. No hernia  GENITALIA: Not examined.  BREASTS: Not examined.  MUSKEL: No joint swelling, decreased motion, or inflammation  EXTREMS: No edema, clubbing, cyanosis, No varicose veins.  NEURO: Grossly nonfocal, Gait is coordinated and smooth, Cognition is preserved.  SKIN: No rashes, no ecchymoses, no petechia.  PSYCH: Oriented to time, place and person. Memory is preserved. Mood and affect appear normal      RECENT LABS:  Lab on 05/02/2018   Component Date Value Ref Range Status   • Glucose 05/02/2018 198* 70 - 100 mg/dL Final   • BUN 05/02/2018 42* 5 - 21 mg/dL Final   • Creatinine 05/02/2018 1.95* 0.50 - 1.40 mg/dL Final   • Sodium 05/02/2018 143  135 - 145 mmol/L Final   • Potassium 05/02/2018 4.5  3.5 - 5.3 mmol/L Final   • Chloride 05/02/2018 104  98 - 110 mmol/L Final   • CO2 05/02/2018 27.0  24.0 - 31.0 mmol/L Final   • Calcium 05/02/2018 9.9  8.4 - 10.4 mg/dL Final   • Total Protein 05/02/2018 6.9  6.3 - 8.7 g/dL Final   • Albumin 05/02/2018 3.80  3.50 - 5.00 g/dL Final   • ALT (SGPT) 05/02/2018 21  0 -  54 U/L Final   • AST (SGOT) 05/02/2018 22  7 - 45 U/L Final   • Alkaline Phosphatase 05/02/2018 76  24 - 120 U/L Final   • Total Bilirubin 05/02/2018 0.3  0.1 - 1.0 mg/dL Final   • eGFR Non African Amer 05/02/2018 26* >60 mL/min/1.73 Final   • Globulin 05/02/2018 3.1  gm/dL Final   • A/G Ratio 05/02/2018 1.2  1.1 - 2.5 g/dL Final   • BUN/Creatinine Ratio 05/02/2018 21.5  7.0 - 25.0 Final   • Anion Gap 05/02/2018 12.0  4.0 - 13.0 mmol/L Final   • WBC 05/02/2018 10.28  4.80 - 10.80 10*3/mm3 Final   • RBC 05/02/2018 3.44* 4.20 - 5.40 10*6/mm3 Final   • Hemoglobin 05/02/2018 10.2* 12.0 - 16.0 g/dL Final   • Hematocrit 05/02/2018 31.5* 37.0 - 47.0 % Final   • MCV 05/02/2018 91.6  82.0 - 98.0 fL Final   • MCH 05/02/2018 29.7  28.0 - 32.0 pg Final   • MCHC 05/02/2018 32.4* 33.0 - 36.0 g/dL Final   • RDW 05/02/2018 17.0* 12.0 - 15.0 % Final   • RDW-SD 05/02/2018 56.6* 40.0 - 54.0 fl Final   • MPV 05/02/2018 10.1  6.0 - 12.0 fL Final   • Platelets 05/02/2018 231  130 - 400 10*3/mm3 Final   • Neutrophil % 05/02/2018 70.0  39.0 - 78.0 % Final   • Lymphocyte % 05/02/2018 20.2  15.0 - 45.0 % Final   • Monocyte % 05/02/2018 5.7  4.0 - 12.0 % Final   • Eosinophil % 05/02/2018 2.6  0.0 - 4.0 % Final   • Basophil % 05/02/2018 0.5  0.0 - 2.0 % Final   • Immature Grans % 05/02/2018 1.0  0.0 - 5.0 % Final   • Neutrophils, Absolute 05/02/2018 7.19  1.87 - 8.40 10*3/mm3 Final   • Lymphocytes, Absolute 05/02/2018 2.08  0.72 - 4.86 10*3/mm3 Final   • Monocytes, Absolute 05/02/2018 0.59  0.19 - 1.30 10*3/mm3 Final   • Eosinophils, Absolute 05/02/2018 0.27  0.00 - 0.70 10*3/mm3 Final   • Basophils, Absolute 05/02/2018 0.05  0.00 - 0.20 10*3/mm3 Final   • Immature Grans, Absolute 05/02/2018 0.10* 0.00 - 0.03 10*3/mm3 Final   • nRBC 05/02/2018 0.0  0.0 - 0.0 /100 WBC Final       RADIOLOGY:  No results found.         Assessment/Plan        Anemia secondary to chronic kidney disease, Stage III. Patient has not required VALERIE therapy this far.  2.  Iron deficiency in the setting of chronic kidney disease, has benefitted from Feraheme. Had Feraheme in January, hemoglobin has  now near normalized at 10.3.  Plan continue watchful waiting return to clinic in 3 months time and check another CBC and iron studies.  If the iron is still low I may give additional intravenous iron.  Her CMS guidelines I cannot give her Procrit unless hemoglobin is less than 10.    Clinically patient asymptomatic.  I will redraw her iron studies today.  Hemoglobin is a 10.  Return to clinic in 3 months time.            Marco A Melo MD    5/2/2018    1:38 PM             [facility]  HEMATOLOGY & ONCOLOGY        Subjective     VISIT DIAGNOSIS:   No diagnosis found.    REASON FOR VISIT:     No chief complaint on file.       HEMATOLOGY / ONCOLOGY HISTORY:Ms. Shrestha is a 55 year old female with past medical history of hypertension, chronic kidney disease, diabetes insulindependent  type 2   No history exists.     [No treatment plan]  Cancer Staging Information:  No matching staging information was found for the patient.      INTERVAL HISTORY  Patient ID: Maria C Shrestha is a 58 y.o. year old female   Anemia secondary to chronic kidney disease, Stage III. Patient has not required VALERIE therapy this far.  2. Iron deficiency in the setting of chronic kidney disease, has benefitted from Feraheme. Had Feraheme in January, hemoglobin has  now near normalized at 10.7        Review of Systems   Constitutional: Negative.    HENT: Negative.    Eyes: Negative.    Respiratory: Negative.    Cardiovascular: Negative.    Gastrointestinal: Negative.    Endocrine: Negative.    Genitourinary: Negative.    Musculoskeletal: Negative.    Skin: Negative.    Allergic/Immunologic: Negative.    Neurological: Negative.    Hematological: Negative.    Psychiatric/Behavioral: Negative.             Medications:    Current Outpatient Prescriptions   Medication Sig Dispense Refill   • allopurinol (ZYLOPRIM) 100  "MG tablet Take 100 mg by mouth 2 (Two) Times a Day.     • aspirin 81 MG tablet Take 81 mg by mouth Daily.     • B-D ULTRA-FINE 33 LANCETS misc Use 4 x daily 120 each 11   • busPIRone (BUSPAR) 10 MG tablet Take 10 mg by mouth 2 (Two) Times a Day.     • calcitriol (ROCALTROL) 0.25 MCG capsule Take 0.25 mcg by mouth 3 (Three) Times a Week.     • Calcium Carb-Cholecalciferol (CALCIUM-VITAMIN D3) 600-400 MG-UNIT tablet Take 1 tablet by mouth 2 (Two) Times a Day.     • carvedilol (COREG) 3.125 MG tablet Take 3.125 mg by mouth 2 (Two) Times a Day.     • cetirizine (zyrTEC) 10 MG tablet Take 10 mg by mouth As Needed.     • cholecalciferol (VITAMIN D3) 10527 UNITS capsule Take 50,000 Units by mouth Every 7 (Seven) Days.     • citalopram (CeleXA) 20 MG tablet Take 20 mg by mouth Daily.     • fluticasone (VERAMYST) 27.5 MCG/SPRAY nasal spray 2 sprays into each nostril Daily.     • furosemide (LASIX) 40 MG tablet Take 1 tablet by mouth Daily. (Patient taking differently: Take 40 mg by mouth As Needed.) 90 tablet 3   • Glucose Blood (BLOOD GLUCOSE TEST) strip Use 4 x daily, use any brand covered by insurance or same brand as before 360 each 3   • HUMULIN R 500 UNIT/ML CONCENTRATED injection USING A U100 SYRINGE DRAW UP TO THE DIRECTED UNIT ROXI AND INJECT FOUR TIMES A DAY (UP TO 30 UNIT ROXI FOUR TIMES A DAY) 100 mL 3   • insulin glargine (LANTUS) 100 UNIT/ML injection 200 units daily 18 each 3   • insulin regular (HUMULIN R) 500 UNIT/ML CONCENTRATED injection Inject 10 Units under the skin 3 (Three) Times a Day Before Meals.     • Insulin Syringe 31G X 5/16\" 1 ML misc 4 x daily 120 each 11   • Insulin Syringe-Needle U-100 31G X 5/16\" 0.3 ML misc use as indicated 4 times daily. 120 each 11   • lamoTRIgine (LaMICtal) 50 MG tablet dispersible disintegrating tablet Take 50 mg by mouth Daily.     • levothyroxine (SYNTHROID, LEVOTHROID) 50 MCG tablet Take 1 tablet by mouth Daily. 90 tablet 1   • lisinopril (PRINIVIL,ZESTRIL) 20 MG " tablet Take 20 mg by mouth 2 (Two) Times a Day.     • Multiple Vitamins-Minerals (MULTIVITAMIN ADULT PO) Take 1 tablet by mouth Daily.     • potassium gluconate 595 MG tablet tablet Take 595 mg by mouth 2 (Two) Times a Day.     • rosuvastatin (CRESTOR) 10 MG tablet Take 10 mg by mouth Daily.     • sodium bicarbonate 650 MG tablet Take 650 mg by mouth 3 (Three) Times a Day.     • TRULICITY 1.5 MG/0.5ML solution pen-injector INJECT 1.5 MG UNDER THE SKIN EVERY 7 DAYS 6 mL 3     No current facility-administered medications for this visit.        ALLERGIES:    Allergies   Allergen Reactions   • Codeine Nausea Only       Objective      @VITALS    Current Status 5/2/2018   ECOG score 1       General Appearance: Patient is awake, alert, oriented and in no acute distress. Patient is welldeveloped, wellnourished, and appears stated age.  HEENT: Normocephalic. Sclerae clear, conjunctiva pink, extraocular movements intact, pupils, round, reactive to light and  accommodation. Mouth and throat are clear with moist oral mucosa.  NECK: Supple, no jugular venous distention, thyroid not enlarged.  LYMPH: No cervical, supraclavicular, axillary, or inguinal lymphadenopathy.  CHEST: Equal bilateral expansion, AP  diameter normal, resonant percussion note  LUNGS: Good air movement, no rales, rhonchi, rubs or wheezes with auscultation  CARDIO: Regular sinus rhythm, no murmurs, gallops or rubs.  ABDOMEN: Nondistended, soft, No tenderness, no guarding, no rebound, No hepatosplenomegaly. No abdominal masses. Bowel sounds positive. No hernia  GENITALIA: Not examined.  BREASTS: Not examined.  MUSKEL: No joint swelling, decreased motion, or inflammation  EXTREMS: No edema, clubbing, cyanosis, No varicose veins.  NEURO: Grossly nonfocal, Gait is coordinated and smooth, Cognition is preserved.  SKIN: No rashes, no ecchymoses, no petechia.  PSYCH: Oriented to time, place and person. Memory is preserved. Mood and affect appear normal      RECENT  LABS:  Lab on 05/02/2018   Component Date Value Ref Range Status   • Glucose 05/02/2018 198* 70 - 100 mg/dL Final   • BUN 05/02/2018 42* 5 - 21 mg/dL Final   • Creatinine 05/02/2018 1.95* 0.50 - 1.40 mg/dL Final   • Sodium 05/02/2018 143  135 - 145 mmol/L Final   • Potassium 05/02/2018 4.5  3.5 - 5.3 mmol/L Final   • Chloride 05/02/2018 104  98 - 110 mmol/L Final   • CO2 05/02/2018 27.0  24.0 - 31.0 mmol/L Final   • Calcium 05/02/2018 9.9  8.4 - 10.4 mg/dL Final   • Total Protein 05/02/2018 6.9  6.3 - 8.7 g/dL Final   • Albumin 05/02/2018 3.80  3.50 - 5.00 g/dL Final   • ALT (SGPT) 05/02/2018 21  0 - 54 U/L Final   • AST (SGOT) 05/02/2018 22  7 - 45 U/L Final   • Alkaline Phosphatase 05/02/2018 76  24 - 120 U/L Final   • Total Bilirubin 05/02/2018 0.3  0.1 - 1.0 mg/dL Final   • eGFR Non African Amer 05/02/2018 26* >60 mL/min/1.73 Final   • Globulin 05/02/2018 3.1  gm/dL Final   • A/G Ratio 05/02/2018 1.2  1.1 - 2.5 g/dL Final   • BUN/Creatinine Ratio 05/02/2018 21.5  7.0 - 25.0 Final   • Anion Gap 05/02/2018 12.0  4.0 - 13.0 mmol/L Final   • WBC 05/02/2018 10.28  4.80 - 10.80 10*3/mm3 Final   • RBC 05/02/2018 3.44* 4.20 - 5.40 10*6/mm3 Final   • Hemoglobin 05/02/2018 10.2* 12.0 - 16.0 g/dL Final   • Hematocrit 05/02/2018 31.5* 37.0 - 47.0 % Final   • MCV 05/02/2018 91.6  82.0 - 98.0 fL Final   • MCH 05/02/2018 29.7  28.0 - 32.0 pg Final   • MCHC 05/02/2018 32.4* 33.0 - 36.0 g/dL Final   • RDW 05/02/2018 17.0* 12.0 - 15.0 % Final   • RDW-SD 05/02/2018 56.6* 40.0 - 54.0 fl Final   • MPV 05/02/2018 10.1  6.0 - 12.0 fL Final   • Platelets 05/02/2018 231  130 - 400 10*3/mm3 Final   • Neutrophil % 05/02/2018 70.0  39.0 - 78.0 % Final   • Lymphocyte % 05/02/2018 20.2  15.0 - 45.0 % Final   • Monocyte % 05/02/2018 5.7  4.0 - 12.0 % Final   • Eosinophil % 05/02/2018 2.6  0.0 - 4.0 % Final   • Basophil % 05/02/2018 0.5  0.0 - 2.0 % Final   • Immature Grans % 05/02/2018 1.0  0.0 - 5.0 % Final   • Neutrophils, Absolute  05/02/2018 7.19  1.87 - 8.40 10*3/mm3 Final   • Lymphocytes, Absolute 05/02/2018 2.08  0.72 - 4.86 10*3/mm3 Final   • Monocytes, Absolute 05/02/2018 0.59  0.19 - 1.30 10*3/mm3 Final   • Eosinophils, Absolute 05/02/2018 0.27  0.00 - 0.70 10*3/mm3 Final   • Basophils, Absolute 05/02/2018 0.05  0.00 - 0.20 10*3/mm3 Final   • Immature Grans, Absolute 05/02/2018 0.10* 0.00 - 0.03 10*3/mm3 Final   • nRBC 05/02/2018 0.0  0.0 - 0.0 /100 WBC Final       RADIOLOGY:  No results found.         Assessment/Plan        Anemia secondary to chronic kidney disease, Stage III. Patient has not required VALERIE therapy this far.  2. Iron deficiency in the setting of chronic kidney disease, has benefitted from Feraheme. Had Feraheme in January, hemoglobin has  now near normalized at 10.2.  Plan continue watchful waiting return to clinic in 3 months time and check another CBC and iron studies.  If the iron is still low I may give additional intravenous iron.  .    Clinically patient asymptomatic.  I will redraw her iron studies today.  Hemoglobin is a 10.3gm Today. Per CMS guidelines Rx Procrit 40,000 u s/q every month.       Marco A Melo MD    5/2/2018    1:38 PM             [facility]  HEMATOLOGY & ONCOLOGY        Subjective     VISIT DIAGNOSIS:   No diagnosis found.    REASON FOR VISIT:     No chief complaint on file.       HEMATOLOGY / ONCOLOGY HISTORY:Ms. Shrestha is a 55 year old female with past medical history of hypertension, chronic kidney disease, diabetes insulindependent  type 2   No history exists.     [No treatment plan]  Cancer Staging Information:  No matching staging information was found for the patient.      INTERVAL HISTORY  Patient ID: Maria C Shrestha is a 58 y.o. year old female   Anemia secondary to chronic kidney disease, Stage III. Patient has not required VALERIE therapy this far.  2. Iron deficiency in the setting of chronic kidney disease, has benefitted from Feraheme. Had Feraheme in January, hemoglobin  has  now near normalized at 10.7        Review of Systems   Constitutional: Negative.    HENT: Negative.    Eyes: Negative.    Respiratory: Negative.    Cardiovascular: Negative.    Gastrointestinal: Negative.    Endocrine: Negative.    Genitourinary: Negative.    Musculoskeletal: Negative.    Skin: Negative.    Allergic/Immunologic: Negative.    Neurological: Negative.    Hematological: Negative.    Psychiatric/Behavioral: Negative.             Medications:    Current Outpatient Prescriptions   Medication Sig Dispense Refill   • allopurinol (ZYLOPRIM) 100 MG tablet Take 100 mg by mouth 2 (Two) Times a Day.     • aspirin 81 MG tablet Take 81 mg by mouth Daily.     • B-D ULTRA-FINE 33 LANCETS misc Use 4 x daily 120 each 11   • busPIRone (BUSPAR) 10 MG tablet Take 10 mg by mouth 2 (Two) Times a Day.     • calcitriol (ROCALTROL) 0.25 MCG capsule Take 0.25 mcg by mouth 3 (Three) Times a Week.     • Calcium Carb-Cholecalciferol (CALCIUM-VITAMIN D3) 600-400 MG-UNIT tablet Take 1 tablet by mouth 2 (Two) Times a Day.     • carvedilol (COREG) 3.125 MG tablet Take 3.125 mg by mouth 2 (Two) Times a Day.     • cetirizine (zyrTEC) 10 MG tablet Take 10 mg by mouth As Needed.     • cholecalciferol (VITAMIN D3) 87500 UNITS capsule Take 50,000 Units by mouth Every 7 (Seven) Days.     • citalopram (CeleXA) 20 MG tablet Take 20 mg by mouth Daily.     • fluticasone (VERAMYST) 27.5 MCG/SPRAY nasal spray 2 sprays into each nostril Daily.     • furosemide (LASIX) 40 MG tablet Take 1 tablet by mouth Daily. (Patient taking differently: Take 40 mg by mouth As Needed.) 90 tablet 3   • Glucose Blood (BLOOD GLUCOSE TEST) strip Use 4 x daily, use any brand covered by insurance or same brand as before 360 each 3   • HUMULIN R 500 UNIT/ML CONCENTRATED injection USING A U100 SYRINGE DRAW UP TO THE DIRECTED UNIT ROXI AND INJECT FOUR TIMES A DAY (UP TO 30 UNIT ROXI FOUR TIMES A DAY) 100 mL 3   • insulin glargine (LANTUS) 100 UNIT/ML injection 200  "units daily 18 each 3   • insulin regular (HUMULIN R) 500 UNIT/ML CONCENTRATED injection Inject 10 Units under the skin 3 (Three) Times a Day Before Meals.     • Insulin Syringe 31G X 5/16\" 1 ML misc 4 x daily 120 each 11   • Insulin Syringe-Needle U-100 31G X 5/16\" 0.3 ML misc use as indicated 4 times daily. 120 each 11   • lamoTRIgine (LaMICtal) 50 MG tablet dispersible disintegrating tablet Take 50 mg by mouth Daily.     • levothyroxine (SYNTHROID, LEVOTHROID) 50 MCG tablet Take 1 tablet by mouth Daily. 90 tablet 1   • lisinopril (PRINIVIL,ZESTRIL) 20 MG tablet Take 20 mg by mouth 2 (Two) Times a Day.     • Multiple Vitamins-Minerals (MULTIVITAMIN ADULT PO) Take 1 tablet by mouth Daily.     • potassium gluconate 595 MG tablet tablet Take 595 mg by mouth 2 (Two) Times a Day.     • rosuvastatin (CRESTOR) 10 MG tablet Take 10 mg by mouth Daily.     • sodium bicarbonate 650 MG tablet Take 650 mg by mouth 3 (Three) Times a Day.     • TRULICITY 1.5 MG/0.5ML solution pen-injector INJECT 1.5 MG UNDER THE SKIN EVERY 7 DAYS 6 mL 3     No current facility-administered medications for this visit.        ALLERGIES:    Allergies   Allergen Reactions   • Codeine Nausea Only       Objective      @VITALS    Current Status 5/2/2018   ECOG score 1       General Appearance: Patient is awake, alert, oriented and in no acute distress. Patient is welldeveloped, wellnourished, and appears stated age.  HEENT: Normocephalic. Sclerae clear, conjunctiva pink, extraocular movements intact, pupils, round, reactive to light and  accommodation. Mouth and throat are clear with moist oral mucosa.  NECK: Supple, no jugular venous distention, thyroid not enlarged.  LYMPH: No cervical, supraclavicular, axillary, or inguinal lymphadenopathy.  CHEST: Equal bilateral expansion, AP  diameter normal, resonant percussion note  LUNGS: Good air movement, no rales, rhonchi, rubs or wheezes with auscultation  CARDIO: Regular sinus rhythm, no murmurs, gallops " or rubs.  ABDOMEN: Nondistended, soft, No tenderness, no guarding, no rebound, No hepatosplenomegaly. No abdominal masses. Bowel sounds positive. No hernia  GENITALIA: Not examined.  BREASTS: Not examined.  MUSKEL: No joint swelling, decreased motion, or inflammation  EXTREMS: No edema, clubbing, cyanosis, No varicose veins.  NEURO: Grossly nonfocal, Gait is coordinated and smooth, Cognition is preserved.  SKIN: No rashes, no ecchymoses, no petechia.  PSYCH: Oriented to time, place and person. Memory is preserved. Mood and affect appear normal      RECENT LABS:  Lab on 05/02/2018   Component Date Value Ref Range Status   • Glucose 05/02/2018 198* 70 - 100 mg/dL Final   • BUN 05/02/2018 42* 5 - 21 mg/dL Final   • Creatinine 05/02/2018 1.95* 0.50 - 1.40 mg/dL Final   • Sodium 05/02/2018 143  135 - 145 mmol/L Final   • Potassium 05/02/2018 4.5  3.5 - 5.3 mmol/L Final   • Chloride 05/02/2018 104  98 - 110 mmol/L Final   • CO2 05/02/2018 27.0  24.0 - 31.0 mmol/L Final   • Calcium 05/02/2018 9.9  8.4 - 10.4 mg/dL Final   • Total Protein 05/02/2018 6.9  6.3 - 8.7 g/dL Final   • Albumin 05/02/2018 3.80  3.50 - 5.00 g/dL Final   • ALT (SGPT) 05/02/2018 21  0 - 54 U/L Final   • AST (SGOT) 05/02/2018 22  7 - 45 U/L Final   • Alkaline Phosphatase 05/02/2018 76  24 - 120 U/L Final   • Total Bilirubin 05/02/2018 0.3  0.1 - 1.0 mg/dL Final   • eGFR Non African Amer 05/02/2018 26* >60 mL/min/1.73 Final   • Globulin 05/02/2018 3.1  gm/dL Final   • A/G Ratio 05/02/2018 1.2  1.1 - 2.5 g/dL Final   • BUN/Creatinine Ratio 05/02/2018 21.5  7.0 - 25.0 Final   • Anion Gap 05/02/2018 12.0  4.0 - 13.0 mmol/L Final   • WBC 05/02/2018 10.28  4.80 - 10.80 10*3/mm3 Final   • RBC 05/02/2018 3.44* 4.20 - 5.40 10*6/mm3 Final   • Hemoglobin 05/02/2018 10.2* 12.0 - 16.0 g/dL Final   • Hematocrit 05/02/2018 31.5* 37.0 - 47.0 % Final   • MCV 05/02/2018 91.6  82.0 - 98.0 fL Final   • MCH 05/02/2018 29.7  28.0 - 32.0 pg Final   • MCHC 05/02/2018 32.4*  33.0 - 36.0 g/dL Final   • RDW 05/02/2018 17.0* 12.0 - 15.0 % Final   • RDW-SD 05/02/2018 56.6* 40.0 - 54.0 fl Final   • MPV 05/02/2018 10.1  6.0 - 12.0 fL Final   • Platelets 05/02/2018 231  130 - 400 10*3/mm3 Final   • Neutrophil % 05/02/2018 70.0  39.0 - 78.0 % Final   • Lymphocyte % 05/02/2018 20.2  15.0 - 45.0 % Final   • Monocyte % 05/02/2018 5.7  4.0 - 12.0 % Final   • Eosinophil % 05/02/2018 2.6  0.0 - 4.0 % Final   • Basophil % 05/02/2018 0.5  0.0 - 2.0 % Final   • Immature Grans % 05/02/2018 1.0  0.0 - 5.0 % Final   • Neutrophils, Absolute 05/02/2018 7.19  1.87 - 8.40 10*3/mm3 Final   • Lymphocytes, Absolute 05/02/2018 2.08  0.72 - 4.86 10*3/mm3 Final   • Monocytes, Absolute 05/02/2018 0.59  0.19 - 1.30 10*3/mm3 Final   • Eosinophils, Absolute 05/02/2018 0.27  0.00 - 0.70 10*3/mm3 Final   • Basophils, Absolute 05/02/2018 0.05  0.00 - 0.20 10*3/mm3 Final   • Immature Grans, Absolute 05/02/2018 0.10* 0.00 - 0.03 10*3/mm3 Final   • nRBC 05/02/2018 0.0  0.0 - 0.0 /100 WBC Final       RADIOLOGY:  No results found.         Assessment/Plan        Anemia secondary to chronic kidney disease, Stage III. Patient has not required VALERIE therapy this far.  2. Iron deficiency in the setting of chronic kidney disease, has benefitted from Feraheme. Had Feraheme in January, hemoglobin has  now near normalized at 10.3.  Plan continue watchful waiting return to clinic in 3 months time and check another CBC and iron studies.  If the iron is still low I may give additional intravenous iron.  Her CMS guidelines I cannot give her Procrit unless hemoglobin is less than 10.    Clinically patient asymptomatic.  I will redraw her iron studies today.  Hemoglobin is a 10.  Return to clinic in 3 months time.            Marco A Melo MD    5/2/2018    1:38 PM             [facility]  HEMATOLOGY & ONCOLOGY        Subjective     VISIT DIAGNOSIS:   No diagnosis found.    REASON FOR VISIT:     No chief complaint on file.        HEMATOLOGY / ONCOLOGY HISTORY:Ms. Shrestha is a 55 year old female with past medical history of hypertension, chronic kidney disease, diabetes insulindependent  type 2   No history exists.     [No treatment plan]  Cancer Staging Information:  No matching staging information was found for the patient.      INTERVAL HISTORY  Patient ID: Maria C Shrestha is a 58 y.o. year old female   Anemia secondary to chronic kidney disease, Stage III. Patient has not required VALERIE therapy this far.  2. Iron deficiency in the setting of chronic kidney disease, has benefitted from Feraheme. Had Feraheme in January, hemoglobin has  now near normalized at 10.7        Review of Systems   Constitutional: Negative.    HENT: Negative.    Eyes: Negative.    Respiratory: Negative.    Cardiovascular: Negative.    Gastrointestinal: Negative.    Endocrine: Negative.    Genitourinary: Negative.    Musculoskeletal: Negative.    Skin: Negative.    Allergic/Immunologic: Negative.    Neurological: Negative.    Hematological: Negative.    Psychiatric/Behavioral: Negative.             Medications:    Current Outpatient Prescriptions   Medication Sig Dispense Refill   • allopurinol (ZYLOPRIM) 100 MG tablet Take 100 mg by mouth 2 (Two) Times a Day.     • aspirin 81 MG tablet Take 81 mg by mouth Daily.     • B-D ULTRA-FINE 33 LANCETS misc Use 4 x daily 120 each 11   • busPIRone (BUSPAR) 10 MG tablet Take 10 mg by mouth 2 (Two) Times a Day.     • calcitriol (ROCALTROL) 0.25 MCG capsule Take 0.25 mcg by mouth 3 (Three) Times a Week.     • Calcium Carb-Cholecalciferol (CALCIUM-VITAMIN D3) 600-400 MG-UNIT tablet Take 1 tablet by mouth 2 (Two) Times a Day.     • carvedilol (COREG) 3.125 MG tablet Take 3.125 mg by mouth 2 (Two) Times a Day.     • cetirizine (zyrTEC) 10 MG tablet Take 10 mg by mouth As Needed.     • cholecalciferol (VITAMIN D3) 93121 UNITS capsule Take 50,000 Units by mouth Every 7 (Seven) Days.     • citalopram (CeleXA) 20 MG tablet Take 20 mg by  "mouth Daily.     • fluticasone (VERAMYST) 27.5 MCG/SPRAY nasal spray 2 sprays into each nostril Daily.     • furosemide (LASIX) 40 MG tablet Take 1 tablet by mouth Daily. (Patient taking differently: Take 40 mg by mouth As Needed.) 90 tablet 3   • Glucose Blood (BLOOD GLUCOSE TEST) strip Use 4 x daily, use any brand covered by insurance or same brand as before 360 each 3   • HUMULIN R 500 UNIT/ML CONCENTRATED injection USING A U100 SYRINGE DRAW UP TO THE DIRECTED UNIT ROXI AND INJECT FOUR TIMES A DAY (UP TO 30 UNIT ROXI FOUR TIMES A DAY) 100 mL 3   • insulin glargine (LANTUS) 100 UNIT/ML injection 200 units daily 18 each 3   • insulin regular (HUMULIN R) 500 UNIT/ML CONCENTRATED injection Inject 10 Units under the skin 3 (Three) Times a Day Before Meals.     • Insulin Syringe 31G X 5/16\" 1 ML misc 4 x daily 120 each 11   • Insulin Syringe-Needle U-100 31G X 5/16\" 0.3 ML misc use as indicated 4 times daily. 120 each 11   • lamoTRIgine (LaMICtal) 50 MG tablet dispersible disintegrating tablet Take 50 mg by mouth Daily.     • levothyroxine (SYNTHROID, LEVOTHROID) 50 MCG tablet Take 1 tablet by mouth Daily. 90 tablet 1   • lisinopril (PRINIVIL,ZESTRIL) 20 MG tablet Take 20 mg by mouth 2 (Two) Times a Day.     • Multiple Vitamins-Minerals (MULTIVITAMIN ADULT PO) Take 1 tablet by mouth Daily.     • potassium gluconate 595 MG tablet tablet Take 595 mg by mouth 2 (Two) Times a Day.     • rosuvastatin (CRESTOR) 10 MG tablet Take 10 mg by mouth Daily.     • sodium bicarbonate 650 MG tablet Take 650 mg by mouth 3 (Three) Times a Day.     • TRULICITY 1.5 MG/0.5ML solution pen-injector INJECT 1.5 MG UNDER THE SKIN EVERY 7 DAYS 6 mL 3     No current facility-administered medications for this visit.        ALLERGIES:    Allergies   Allergen Reactions   • Codeine Nausea Only       Objective      @VITALS    Current Status 5/2/2018   ECOG score 1       General Appearance: Patient is awake, alert, oriented and in no acute distress. " Patient is welldeveloped, wellnourished, and appears stated age.  HEENT: Normocephalic. Sclerae clear, conjunctiva pink, extraocular movements intact, pupils, round, reactive to light and  accommodation. Mouth and throat are clear with moist oral mucosa.  NECK: Supple, no jugular venous distention, thyroid not enlarged.  LYMPH: No cervical, supraclavicular, axillary, or inguinal lymphadenopathy.  CHEST: Equal bilateral expansion, AP  diameter normal, resonant percussion note  LUNGS: Good air movement, no rales, rhonchi, rubs or wheezes with auscultation  CARDIO: Regular sinus rhythm, no murmurs, gallops or rubs.  ABDOMEN: Nondistended, soft, No tenderness, no guarding, no rebound, No hepatosplenomegaly. No abdominal masses. Bowel sounds positive. No hernia  GENITALIA: Not examined.  BREASTS: Not examined.  MUSKEL: No joint swelling, decreased motion, or inflammation  EXTREMS: No edema, clubbing, cyanosis, No varicose veins.  NEURO: Grossly nonfocal, Gait is coordinated and smooth, Cognition is preserved.  SKIN: No rashes, no ecchymoses, no petechia.  PSYCH: Oriented to time, place and person. Memory is preserved. Mood and affect appear normal      RECENT LABS:  Lab on 05/02/2018   Component Date Value Ref Range Status   • Glucose 05/02/2018 198* 70 - 100 mg/dL Final   • BUN 05/02/2018 42* 5 - 21 mg/dL Final   • Creatinine 05/02/2018 1.95* 0.50 - 1.40 mg/dL Final   • Sodium 05/02/2018 143  135 - 145 mmol/L Final   • Potassium 05/02/2018 4.5  3.5 - 5.3 mmol/L Final   • Chloride 05/02/2018 104  98 - 110 mmol/L Final   • CO2 05/02/2018 27.0  24.0 - 31.0 mmol/L Final   • Calcium 05/02/2018 9.9  8.4 - 10.4 mg/dL Final   • Total Protein 05/02/2018 6.9  6.3 - 8.7 g/dL Final   • Albumin 05/02/2018 3.80  3.50 - 5.00 g/dL Final   • ALT (SGPT) 05/02/2018 21  0 - 54 U/L Final   • AST (SGOT) 05/02/2018 22  7 - 45 U/L Final   • Alkaline Phosphatase 05/02/2018 76  24 - 120 U/L Final   • Total Bilirubin 05/02/2018 0.3  0.1 - 1.0  mg/dL Final   • eGFR Non African Amer 05/02/2018 26* >60 mL/min/1.73 Final   • Globulin 05/02/2018 3.1  gm/dL Final   • A/G Ratio 05/02/2018 1.2  1.1 - 2.5 g/dL Final   • BUN/Creatinine Ratio 05/02/2018 21.5  7.0 - 25.0 Final   • Anion Gap 05/02/2018 12.0  4.0 - 13.0 mmol/L Final   • WBC 05/02/2018 10.28  4.80 - 10.80 10*3/mm3 Final   • RBC 05/02/2018 3.44* 4.20 - 5.40 10*6/mm3 Final   • Hemoglobin 05/02/2018 10.2* 12.0 - 16.0 g/dL Final   • Hematocrit 05/02/2018 31.5* 37.0 - 47.0 % Final   • MCV 05/02/2018 91.6  82.0 - 98.0 fL Final   • MCH 05/02/2018 29.7  28.0 - 32.0 pg Final   • MCHC 05/02/2018 32.4* 33.0 - 36.0 g/dL Final   • RDW 05/02/2018 17.0* 12.0 - 15.0 % Final   • RDW-SD 05/02/2018 56.6* 40.0 - 54.0 fl Final   • MPV 05/02/2018 10.1  6.0 - 12.0 fL Final   • Platelets 05/02/2018 231  130 - 400 10*3/mm3 Final   • Neutrophil % 05/02/2018 70.0  39.0 - 78.0 % Final   • Lymphocyte % 05/02/2018 20.2  15.0 - 45.0 % Final   • Monocyte % 05/02/2018 5.7  4.0 - 12.0 % Final   • Eosinophil % 05/02/2018 2.6  0.0 - 4.0 % Final   • Basophil % 05/02/2018 0.5  0.0 - 2.0 % Final   • Immature Grans % 05/02/2018 1.0  0.0 - 5.0 % Final   • Neutrophils, Absolute 05/02/2018 7.19  1.87 - 8.40 10*3/mm3 Final   • Lymphocytes, Absolute 05/02/2018 2.08  0.72 - 4.86 10*3/mm3 Final   • Monocytes, Absolute 05/02/2018 0.59  0.19 - 1.30 10*3/mm3 Final   • Eosinophils, Absolute 05/02/2018 0.27  0.00 - 0.70 10*3/mm3 Final   • Basophils, Absolute 05/02/2018 0.05  0.00 - 0.20 10*3/mm3 Final   • Immature Grans, Absolute 05/02/2018 0.10* 0.00 - 0.03 10*3/mm3 Final   • nRBC 05/02/2018 0.0  0.0 - 0.0 /100 WBC Final       RADIOLOGY:  No results found.         Assessment/Plan        Anemia secondary to chronic kidney disease, Stage III. .  2. Iron deficiency in the setting of chronic kidney disease, has benefitted from Feraheme. Had Feraheme in January, hemoglobin has  now near normalized at 10.1.  Plan continue watchful waiting return to clinic in  3 months time and check another CBC and iron studies.  If the iron is still low I may give additional intravenous iron.  .    Clinically patient asymptomatic.  I will redraw her iron studies today.  Hemoglobin is a 10.2gm Today. Per CMS guidelines Rx Procrit 40,000 u s/q every month.       Marco A Melo MD    5/2/2018    1:38 PM

## 2018-05-10 DIAGNOSIS — Z12.31 ENCOUNTER FOR SCREENING MAMMOGRAM FOR MALIGNANT NEOPLASM OF BREAST: ICD-10-CM

## 2018-05-10 DIAGNOSIS — Z12.31 VISIT FOR SCREENING MAMMOGRAM: Primary | ICD-10-CM

## 2018-05-16 ENCOUNTER — HOSPITAL ENCOUNTER (OUTPATIENT)
Dept: MAMMOGRAPHY | Facility: HOSPITAL | Age: 58
Discharge: HOME | End: 2018-05-16
Admitting: NURSE PRACTITIONER

## 2018-05-16 DIAGNOSIS — Z12.31 ENCOUNTER FOR SCREENING MAMMOGRAM FOR MALIGNANT NEOPLASM OF BREAST: ICD-10-CM

## 2018-05-16 PROCEDURE — 77063 BREAST TOMOSYNTHESIS BI: CPT

## 2018-05-16 PROCEDURE — 77067 SCR MAMMO BI INCL CAD: CPT

## 2018-05-23 ENCOUNTER — OFFICE VISIT (OUTPATIENT)
Dept: OBSTETRICS AND GYNECOLOGY | Facility: CLINIC | Age: 58
End: 2018-05-23

## 2018-05-23 ENCOUNTER — PROCEDURE VISIT (OUTPATIENT)
Dept: OBSTETRICS AND GYNECOLOGY | Facility: CLINIC | Age: 58
End: 2018-05-23

## 2018-05-23 VITALS
HEIGHT: 65 IN | SYSTOLIC BLOOD PRESSURE: 156 MMHG | DIASTOLIC BLOOD PRESSURE: 64 MMHG | WEIGHT: 293 LBS | BODY MASS INDEX: 48.82 KG/M2

## 2018-05-23 DIAGNOSIS — N95.0 PMB (POSTMENOPAUSAL BLEEDING): Primary | ICD-10-CM

## 2018-05-23 DIAGNOSIS — Z98.890 HISTORY OF D&C: ICD-10-CM

## 2018-05-23 DIAGNOSIS — Z78.9 NON-SMOKER: ICD-10-CM

## 2018-05-23 PROCEDURE — 99213 OFFICE O/P EST LOW 20 MIN: CPT | Performed by: NURSE PRACTITIONER

## 2018-05-23 PROCEDURE — 76830 TRANSVAGINAL US NON-OB: CPT | Performed by: OBSTETRICS & GYNECOLOGY

## 2018-05-23 RX ORDER — MAGNESIUM GLUCONATE 30 MG(550)
TABLET ORAL
COMMUNITY
End: 2021-07-28

## 2018-05-23 RX ORDER — BUSPIRONE HYDROCHLORIDE 10 MG/1
TABLET ORAL
COMMUNITY
End: 2021-03-25

## 2018-05-23 NOTE — PROGRESS NOTES
Attempted to obtain health maintenance information, patient unable to provide answers for the following items Tdap, Medicare Annual Wellness Exam, Zoster Vaccine and HepC.

## 2018-05-23 NOTE — PATIENT INSTRUCTIONS

## 2018-05-23 NOTE — PROGRESS NOTES
"Subjective   Maria C Shrestha is a 58 y.o. female.     Vaginal bleeding.     Pt reports current bleeding episode started approx 2 wks ago with light pink blood then became heavy with clotting.   Pt has had 2 D&C's in the past in approx 2010 and 2015 both after episodes of PMB. Pt had US today.     The following portions of the patient's history were reviewed and updated as appropriate: allergies, current medications, past family history, past medical history, past social history, past surgical history and problem list.    /64 (BP Location: Left arm, Patient Position: Sitting, Cuff Size: Thigh Adult)   Ht 165.1 cm (65\")   Wt (!) 186 kg (410 lb)   LMP  (LMP Unknown)   BMI 68.23 kg/m²     Review of Systems   Constitutional: Negative for activity change, appetite change, fatigue and fever.   Eyes: Positive for pain.   Respiratory: Negative for apnea and shortness of breath.    Cardiovascular: Negative for chest pain and palpitations.   Gastrointestinal: Negative for abdominal distention, abdominal pain, constipation, diarrhea, nausea and vomiting.   Endocrine: Negative for cold intolerance and heat intolerance.   Genitourinary: Positive for vaginal bleeding (started 2 wks ago). Negative for difficulty urinating, frequency, menstrual problem, pelvic pain, vaginal discharge and vaginal pain.        LMP at approx ago 40     Neurological: Negative for headaches.   Psychiatric/Behavioral: Negative for agitation, self-injury, sleep disturbance and suicidal ideas.       Objective   Physical Exam   Constitutional: She is oriented to person, place, and time. She appears well-developed.   Eyes: Right eye exhibits no discharge. Left eye exhibits no discharge.   Cardiovascular: Normal rate and regular rhythm.    Pulmonary/Chest: Effort normal and breath sounds normal.   Neurological: She is alert and oriented to person, place, and time.   Skin: Skin is warm.   Psychiatric: She has a normal mood and affect. Her behavior is " normal. Judgment and thought content normal.   Vitals reviewed.      Assessment/Plan   Discussed US with pt.   US indicates endometrial thickness 10.59 mm, normal ovaries.   Discussed recent bleeding pattern and hx of PMB and D&C.   Pt opts for a consult with MD and declines pelvic exam today.     Patient's Body mass index is 68.23 kg/m². BMI is above normal parameters. Recommendations include: educational material.    RV consult with MD r/t PMB    Maria C was seen today for vaginal bleeding.    Diagnoses and all orders for this visit:    PMB (postmenopausal bleeding)    History of D&C    BMI 60.0-69.9, adult    Non-smoker

## 2018-05-24 ENCOUNTER — OFFICE VISIT (OUTPATIENT)
Dept: OBSTETRICS AND GYNECOLOGY | Facility: CLINIC | Age: 58
End: 2018-05-24

## 2018-05-24 VITALS
HEIGHT: 65 IN | SYSTOLIC BLOOD PRESSURE: 158 MMHG | BODY MASS INDEX: 48.82 KG/M2 | WEIGHT: 293 LBS | DIASTOLIC BLOOD PRESSURE: 80 MMHG

## 2018-05-24 DIAGNOSIS — N95.0 POSTMENOPAUSAL BLEEDING: Primary | ICD-10-CM

## 2018-05-24 DIAGNOSIS — Z79.4 TYPE 2 DIABETES MELLITUS WITH STAGE 3 CHRONIC KIDNEY DISEASE, WITH LONG-TERM CURRENT USE OF INSULIN (HCC): ICD-10-CM

## 2018-05-24 DIAGNOSIS — N18.30 TYPE 2 DIABETES MELLITUS WITH STAGE 3 CHRONIC KIDNEY DISEASE, WITH LONG-TERM CURRENT USE OF INSULIN (HCC): ICD-10-CM

## 2018-05-24 DIAGNOSIS — E11.22 TYPE 2 DIABETES MELLITUS WITH STAGE 3 CHRONIC KIDNEY DISEASE, WITH LONG-TERM CURRENT USE OF INSULIN (HCC): ICD-10-CM

## 2018-05-24 PROCEDURE — 99213 OFFICE O/P EST LOW 20 MIN: CPT | Performed by: OBSTETRICS & GYNECOLOGY

## 2018-05-24 RX ORDER — MEDROXYPROGESTERONE ACETATE 10 MG/1
TABLET ORAL
Qty: 30 TABLET | Refills: 0 | Status: SHIPPED | OUTPATIENT
Start: 2018-05-24 | End: 2018-06-15

## 2018-05-24 NOTE — PROGRESS NOTES
"Maria C Shrestha is a 58 y.o. female here today for evaluation of postmenopausal bleeding.  She reports a history of endometrial hyperplasia 8 years ago, for which she was treated in [place].  For the past 2 weeks she has been having heavy vaginal bleeding with the passage of clots.  She was evaluated by pelvic ultrasound yesterday showing a 10 mm endometrial stripe.  She reports that actually she has been having vaginal spotting every 2 or 3 months for several years.  Review of her records shows a benign endometrial biopsy from 2014.  She is not on anticoagulation but does take a baby aspirin.  Review her medical history reveals type II diabetes with chronic kidney disease.    Visit Vitals  /80 (BP Location: Left arm, Patient Position: Sitting)   Ht 165.1 cm (65\")   Wt (!) 186 kg (410 lb)   LMP  (LMP Unknown)   BMI 68.23 kg/m²      Pleasant female no acute distress  Breathing unlabored  Skin without pallor    Assessment: Postmenopausal bleeding, BMI 68, type II diabetes with chronic kidney disease    Given her medical comorbidities, I would prefer to try to get her and metria sampling done in the office.  Her flow is too heavy today to perform an endometrial biopsy.  Therefore I given her prescription for Provera in a tapering fashion.  She will return to the office within the next couple of weeks to make sure that her bleeding has improved and to perform an endometrial biopsy.  She is certainly at risk for endometrial neoplasia given her diabetes, obesity, and history of endometrial hyperplasia.      "

## 2018-05-29 DIAGNOSIS — E11.22 TYPE 2 DIABETES MELLITUS WITH ESRD (END-STAGE RENAL DISEASE) (HCC): Primary | ICD-10-CM

## 2018-05-29 DIAGNOSIS — N18.6 TYPE 2 DIABETES MELLITUS WITH ESRD (END-STAGE RENAL DISEASE) (HCC): Primary | ICD-10-CM

## 2018-05-29 DIAGNOSIS — D63.1 ANEMIA IN STAGE 3 CHRONIC KIDNEY DISEASE (HCC): ICD-10-CM

## 2018-05-29 DIAGNOSIS — N18.30 ANEMIA IN STAGE 3 CHRONIC KIDNEY DISEASE (HCC): ICD-10-CM

## 2018-05-30 ENCOUNTER — INFUSION (OUTPATIENT)
Dept: ONCOLOGY | Facility: HOSPITAL | Age: 58
End: 2018-05-30
Attending: INTERNAL MEDICINE

## 2018-05-30 ENCOUNTER — OFFICE VISIT (OUTPATIENT)
Dept: ONCOLOGY | Facility: CLINIC | Age: 58
End: 2018-05-30

## 2018-05-30 ENCOUNTER — LAB (OUTPATIENT)
Dept: LAB | Facility: HOSPITAL | Age: 58
End: 2018-05-30
Attending: INTERNAL MEDICINE

## 2018-05-30 VITALS
DIASTOLIC BLOOD PRESSURE: 57 MMHG | BODY MASS INDEX: 48.82 KG/M2 | TEMPERATURE: 97.3 F | OXYGEN SATURATION: 99 % | WEIGHT: 293 LBS | RESPIRATION RATE: 20 BRPM | SYSTOLIC BLOOD PRESSURE: 157 MMHG | HEART RATE: 75 BPM | HEIGHT: 65 IN

## 2018-05-30 VITALS
SYSTOLIC BLOOD PRESSURE: 138 MMHG | TEMPERATURE: 99.1 F | WEIGHT: 293 LBS | OXYGEN SATURATION: 95 % | RESPIRATION RATE: 16 BRPM | DIASTOLIC BLOOD PRESSURE: 82 MMHG | BODY MASS INDEX: 48.82 KG/M2 | HEIGHT: 65 IN | HEART RATE: 76 BPM

## 2018-05-30 DIAGNOSIS — N18.30 ANEMIA IN STAGE 3 CHRONIC KIDNEY DISEASE (HCC): ICD-10-CM

## 2018-05-30 DIAGNOSIS — E11.22 TYPE 2 DIABETES MELLITUS WITH ESRD (END-STAGE RENAL DISEASE) (HCC): Primary | ICD-10-CM

## 2018-05-30 DIAGNOSIS — D63.1 ANEMIA IN STAGE 3 CHRONIC KIDNEY DISEASE (HCC): ICD-10-CM

## 2018-05-30 DIAGNOSIS — D63.1 ANEMIA IN STAGE 3 CHRONIC KIDNEY DISEASE (HCC): Primary | ICD-10-CM

## 2018-05-30 DIAGNOSIS — R97.1 ELEVATED CANCER ANTIGEN 125 (CA-125): ICD-10-CM

## 2018-05-30 DIAGNOSIS — N18.6 TYPE 2 DIABETES MELLITUS WITH ESRD (END-STAGE RENAL DISEASE) (HCC): Primary | ICD-10-CM

## 2018-05-30 DIAGNOSIS — N18.30 ANEMIA IN STAGE 3 CHRONIC KIDNEY DISEASE (HCC): Primary | ICD-10-CM

## 2018-05-30 LAB
ALBUMIN SERPL-MCNC: 3.9 G/DL (ref 3.5–5)
ALBUMIN/GLOB SERPL: 1.3 G/DL (ref 1.1–2.5)
ALP SERPL-CCNC: 60 U/L (ref 24–120)
ALT SERPL W P-5'-P-CCNC: 19 U/L (ref 0–54)
ANION GAP SERPL CALCULATED.3IONS-SCNC: 11 MMOL/L (ref 4–13)
AST SERPL-CCNC: 22 U/L (ref 7–45)
BASOPHILS # BLD AUTO: 0.02 10*3/MM3 (ref 0–0.2)
BASOPHILS NFR BLD AUTO: 0.2 % (ref 0–2)
BILIRUB SERPL-MCNC: 0.3 MG/DL (ref 0.1–1)
BUN BLD-MCNC: 33 MG/DL (ref 5–21)
BUN/CREAT SERPL: 15.1 (ref 7–25)
CALCIUM SPEC-SCNC: 10.6 MG/DL (ref 8.4–10.4)
CHLORIDE SERPL-SCNC: 101 MMOL/L (ref 98–110)
CO2 SERPL-SCNC: 28 MMOL/L (ref 24–31)
CREAT BLD-MCNC: 2.18 MG/DL (ref 0.5–1.4)
DEPRECATED RDW RBC AUTO: 57.8 FL (ref 40–54)
EOSINOPHIL # BLD AUTO: 0.24 10*3/MM3 (ref 0–0.7)
EOSINOPHIL NFR BLD AUTO: 2.4 % (ref 0–4)
ERYTHROCYTE [DISTWIDTH] IN BLOOD BY AUTOMATED COUNT: 17.2 % (ref 12–15)
FERRITIN SERPL-MCNC: 89.1 NG/ML (ref 11.1–264)
GFR SERPL CREATININE-BSD FRML MDRD: 23 ML/MIN/1.73
GLOBULIN UR ELPH-MCNC: 3.1 GM/DL
GLUCOSE BLD-MCNC: 208 MG/DL (ref 70–100)
HCT VFR BLD AUTO: 28.6 % (ref 37–47)
HGB BLD-MCNC: 9.4 G/DL (ref 12–16)
HOLD SPECIMEN: NORMAL
HOLD SPECIMEN: NORMAL
IMM GRANULOCYTES # BLD: 0.05 10*3/MM3 (ref 0–0.03)
IMM GRANULOCYTES NFR BLD: 0.5 % (ref 0–5)
IRON 24H UR-MRATE: 63 MCG/DL (ref 42–180)
IRON SATN MFR SERPL: 21 % (ref 20–45)
LYMPHOCYTES # BLD AUTO: 1.84 10*3/MM3 (ref 0.72–4.86)
LYMPHOCYTES NFR BLD AUTO: 18.4 % (ref 15–45)
MCH RBC QN AUTO: 30.1 PG (ref 28–32)
MCHC RBC AUTO-ENTMCNC: 32.9 G/DL (ref 33–36)
MCV RBC AUTO: 91.7 FL (ref 82–98)
MONOCYTES # BLD AUTO: 0.53 10*3/MM3 (ref 0.19–1.3)
MONOCYTES NFR BLD AUTO: 5.3 % (ref 4–12)
NEUTROPHILS # BLD AUTO: 7.3 10*3/MM3 (ref 1.87–8.4)
NEUTROPHILS NFR BLD AUTO: 73.2 % (ref 39–78)
NRBC BLD MANUAL-RTO: 0 /100 WBC (ref 0–0)
PLATELET # BLD AUTO: 214 10*3/MM3 (ref 130–400)
PMV BLD AUTO: 10.2 FL (ref 6–12)
POTASSIUM BLD-SCNC: 4.5 MMOL/L (ref 3.5–5.3)
PROT SERPL-MCNC: 7 G/DL (ref 6.3–8.7)
RBC # BLD AUTO: 3.12 10*6/MM3 (ref 4.2–5.4)
SODIUM BLD-SCNC: 140 MMOL/L (ref 135–145)
TIBC SERPL-MCNC: 300 MCG/DL (ref 225–420)
WBC NRBC COR # BLD: 9.98 10*3/MM3 (ref 4.8–10.8)

## 2018-05-30 PROCEDURE — 36415 COLL VENOUS BLD VENIPUNCTURE: CPT

## 2018-05-30 PROCEDURE — 96372 THER/PROPH/DIAG INJ SC/IM: CPT

## 2018-05-30 PROCEDURE — 80053 COMPREHEN METABOLIC PANEL: CPT

## 2018-05-30 PROCEDURE — 83550 IRON BINDING TEST: CPT

## 2018-05-30 PROCEDURE — 86304 IMMUNOASSAY TUMOR CA 125: CPT | Performed by: INTERNAL MEDICINE

## 2018-05-30 PROCEDURE — 82728 ASSAY OF FERRITIN: CPT

## 2018-05-30 PROCEDURE — 63510000001 EPOETIN ALFA PER 1000 UNITS: Performed by: INTERNAL MEDICINE

## 2018-05-30 PROCEDURE — 83540 ASSAY OF IRON: CPT

## 2018-05-30 PROCEDURE — 85025 COMPLETE CBC W/AUTO DIFF WBC: CPT

## 2018-05-30 PROCEDURE — 99214 OFFICE O/P EST MOD 30 MIN: CPT | Performed by: INTERNAL MEDICINE

## 2018-05-30 RX ADMIN — ERYTHROPOIETIN 40000 UNITS: 40000 INJECTION, SOLUTION INTRAVENOUS; SUBCUTANEOUS at 15:28

## 2018-05-30 NOTE — PROGRESS NOTES
[facility]  HEMATOLOGY & ONCOLOGY        Subjective     VISIT DIAGNOSIS:   Encounter Diagnosis   Name Primary?   • Anemia in stage 3 chronic kidney disease        REASON FOR VISIT:     No chief complaint on file.       HEMATOLOGY / ONCOLOGY HISTORY:Ms. Shrestha is a 55 year old female with past medical history of hypertension, chronic kidney disease, diabetes insulindependent  type 2   No history exists.     [No treatment plan]  Cancer Staging Information:  No matching staging information was found for the patient.      INTERVAL HISTORY  Patient ID: Maria C Shrestha is a 58 y.o. year old female   Anemia secondary to chronic kidney disease, Stage III. Patient has not required VALERIE therapy this far.  2. Iron deficiency in the setting of chronic kidney disease, has benefitted from Feraheme. Had Feraheme in January, hemoglobin has  now near normalized at 10.7        Review of Systems   Constitutional: Negative.    HENT: Negative.    Eyes: Negative.    Respiratory: Negative.    Cardiovascular: Negative.    Gastrointestinal: Negative.    Endocrine: Negative.    Genitourinary: Negative.    Musculoskeletal: Negative.    Skin: Negative.    Allergic/Immunologic: Negative.    Neurological: Negative.    Hematological: Negative.    Psychiatric/Behavioral: Negative.             Medications:    Current Outpatient Prescriptions   Medication Sig Dispense Refill   • allopurinol (ZYLOPRIM) 100 MG tablet Take 100 mg by mouth 2 (Two) Times a Day.     • aspirin 81 MG tablet Take 81 mg by mouth Daily.     • B-D ULTRA-FINE 33 LANCETS misc Use 4 x daily 120 each 11   • busPIRone (BUSPAR) 10 MG tablet buspirone 10 mg tablet   Take 2 tablets twice a day by oral route as needed for 90 days.     • calcitriol (ROCALTROL) 0.25 MCG capsule Take 0.25 mcg by mouth 3 (Three) Times a Week.     • Calcium Carb-Cholecalciferol (CALCIUM-VITAMIN D3) 600-400 MG-UNIT tablet Take 1 tablet by mouth 2 (Two) Times a Day.     • carvedilol (COREG) 3.125  "MG tablet Take 3.125 mg by mouth 2 (Two) Times a Day.     • cetirizine (zyrTEC) 10 MG tablet Take 10 mg by mouth As Needed.     • cholecalciferol (VITAMIN D3) 19424 UNITS capsule Take 50,000 Units by mouth Every 7 (Seven) Days.     • Cholecalciferol (VITAMIN D3) 93155 units tablet Vitamin D2 50,000 unit capsule   Take 1 capsule every week by oral route.     • citalopram (CeleXA) 20 MG tablet Take 20 mg by mouth Daily.     • fluticasone (VERAMYST) 27.5 MCG/SPRAY nasal spray 2 sprays into each nostril Daily.     • furosemide (LASIX) 40 MG tablet Take 1 tablet by mouth Daily. (Patient taking differently: Take 40 mg by mouth As Needed.) 90 tablet 3   • Glucose Blood (BLOOD GLUCOSE TEST) strip Use 4 x daily, use any brand covered by insurance or same brand as before 360 each 3   • HUMULIN R 500 UNIT/ML CONCENTRATED injection USING A U100 SYRINGE DRAW UP TO THE DIRECTED UNIT ROXI AND INJECT FOUR TIMES A DAY (UP TO 30 UNIT ROXI FOUR TIMES A DAY) 100 mL 3   • insulin glargine (LANTUS) 100 UNIT/ML injection 200 units daily 18 each 3   • Insulin Syringe 31G X 5/16\" 1 ML misc 4 x daily 120 each 11   • lamoTRIgine (LaMICtal) 50 MG tablet dispersible disintegrating tablet Take 50 mg by mouth Daily.     • levothyroxine (SYNTHROID, LEVOTHROID) 50 MCG tablet Take 1 tablet by mouth Daily. 90 tablet 1   • lisinopril (PRINIVIL,ZESTRIL) 20 MG tablet Take 20 mg by mouth 2 (Two) Times a Day.     • medroxyPROGESTERone (PROVERA) 10 MG tablet Take 3 tabs daily for 3 days, then 2 daily for 3 days, then one daily 30 tablet 0   • Multiple Vitamins-Minerals (MULTIVITAMIN ADULT PO) Take 1 tablet by mouth Daily.     • potassium gluconate 595 (99 K) MG tablet tablet potassium gluconate 595 mg (99 mg) tablet   Take 2 tablets every day by oral route.     • potassium gluconate 595 MG tablet tablet Take 595 mg by mouth 2 (Two) Times a Day.     • rosuvastatin (CRESTOR) 10 MG tablet Take 10 mg by mouth Daily.     • sodium bicarbonate 650 MG tablet Take " 650 mg by mouth 3 (Three) Times a Day.     • TRULICITY 1.5 MG/0.5ML solution pen-injector INJECT 1.5 MG UNDER THE SKIN EVERY 7 DAYS 6 mL 3     No current facility-administered medications for this visit.        ALLERGIES:    Allergies   Allergen Reactions   • Codeine Nausea Only       Objective      @VITALS    Current Status 5/30/2018   ECOG score 0       General Appearance: Patient is awake, alert, oriented and in no acute distress. Patient is welldeveloped, wellnourished, and appears stated age.  HEENT: Normocephalic. Sclerae clear, conjunctiva pink, extraocular movements intact, pupils, round, reactive to light and  accommodation. Mouth and throat are clear with moist oral mucosa.  NECK: Supple, no jugular venous distention, thyroid not enlarged.  LYMPH: No cervical, supraclavicular, axillary, or inguinal lymphadenopathy.  CHEST: Equal bilateral expansion, AP  diameter normal, resonant percussion note  LUNGS: Good air movement, no rales, rhonchi, rubs or wheezes with auscultation  CARDIO: Regular sinus rhythm, no murmurs, gallops or rubs.  ABDOMEN: Nondistended, soft, No tenderness, no guarding, no rebound, No hepatosplenomegaly. No abdominal masses. Bowel sounds positive. No hernia  GENITALIA: Not examined.  BREASTS: Not examined.  MUSKEL: No joint swelling, decreased motion, or inflammation  EXTREMS: No edema, clubbing, cyanosis, No varicose veins.  NEURO: Grossly nonfocal, Gait is coordinated and smooth, Cognition is preserved.  SKIN: No rashes, no ecchymoses, no petechia.  PSYCH: Oriented to time, place and person. Memory is preserved. Mood and affect appear normal      RECENT LABS:  Lab on 05/30/2018   Component Date Value Ref Range Status   • Iron 05/30/2018 63  42 - 180 mcg/dL Final   • TIBC 05/30/2018 300  225 - 420 mcg/dL Final   • Iron Saturation 05/30/2018 21  20 - 45 % Final   • Glucose 05/30/2018 208* 70 - 100 mg/dL Final   • BUN 05/30/2018 33* 5 - 21 mg/dL Final   • Creatinine 05/30/2018 2.18* 0.50  - 1.40 mg/dL Final   • Sodium 05/30/2018 140  135 - 145 mmol/L Final   • Potassium 05/30/2018 4.5  3.5 - 5.3 mmol/L Final   • Chloride 05/30/2018 101  98 - 110 mmol/L Final   • CO2 05/30/2018 28.0  24.0 - 31.0 mmol/L Final   • Calcium 05/30/2018 10.6* 8.4 - 10.4 mg/dL Final   • Total Protein 05/30/2018 7.0  6.3 - 8.7 g/dL Final   • Albumin 05/30/2018 3.90  3.50 - 5.00 g/dL Final   • ALT (SGPT) 05/30/2018 19  0 - 54 U/L Final   • AST (SGOT) 05/30/2018 22  7 - 45 U/L Final   • Alkaline Phosphatase 05/30/2018 60  24 - 120 U/L Final   • Total Bilirubin 05/30/2018 0.3  0.1 - 1.0 mg/dL Final   • eGFR Non African Amer 05/30/2018 23* >60 mL/min/1.73 Final   • Globulin 05/30/2018 3.1  gm/dL Final   • A/G Ratio 05/30/2018 1.3  1.1 - 2.5 g/dL Final   • BUN/Creatinine Ratio 05/30/2018 15.1  7.0 - 25.0 Final   • Anion Gap 05/30/2018 11.0  4.0 - 13.0 mmol/L Final   • WBC 05/30/2018 9.98  4.80 - 10.80 10*3/mm3 Final   • RBC 05/30/2018 3.12* 4.20 - 5.40 10*6/mm3 Final   • Hemoglobin 05/30/2018 9.4* 12.0 - 16.0 g/dL Final   • Hematocrit 05/30/2018 28.6* 37.0 - 47.0 % Final   • MCV 05/30/2018 91.7  82.0 - 98.0 fL Final   • MCH 05/30/2018 30.1  28.0 - 32.0 pg Final   • MCHC 05/30/2018 32.9* 33.0 - 36.0 g/dL Final   • RDW 05/30/2018 17.2* 12.0 - 15.0 % Final   • RDW-SD 05/30/2018 57.8* 40.0 - 54.0 fl Final   • MPV 05/30/2018 10.2  6.0 - 12.0 fL Final   • Platelets 05/30/2018 214  130 - 400 10*3/mm3 Final   • Neutrophil % 05/30/2018 73.2  39.0 - 78.0 % Final   • Lymphocyte % 05/30/2018 18.4  15.0 - 45.0 % Final   • Monocyte % 05/30/2018 5.3  4.0 - 12.0 % Final   • Eosinophil % 05/30/2018 2.4  0.0 - 4.0 % Final   • Basophil % 05/30/2018 0.2  0.0 - 2.0 % Final   • Immature Grans % 05/30/2018 0.5  0.0 - 5.0 % Final   • Neutrophils, Absolute 05/30/2018 7.30  1.87 - 8.40 10*3/mm3 Final   • Lymphocytes, Absolute 05/30/2018 1.84  0.72 - 4.86 10*3/mm3 Final   • Monocytes, Absolute 05/30/2018 0.53  0.19 - 1.30 10*3/mm3 Final   • Eosinophils,  Absolute 05/30/2018 0.24  0.00 - 0.70 10*3/mm3 Final   • Basophils, Absolute 05/30/2018 0.02  0.00 - 0.20 10*3/mm3 Final   • Immature Grans, Absolute 05/30/2018 0.05* 0.00 - 0.03 10*3/mm3 Final   • nRBC 05/30/2018 0.0  0.0 - 0.0 /100 WBC Final   • Extra Tube 05/30/2018 Hold for add-ons.   Final    Auto resulted.   • Extra Tube 05/30/2018 Hold for add-ons.   Final    Auto resulted.       RADIOLOGY:  Mammo Screening Digital Tomosynthesis Bilateral With Cad    Result Date: 5/16/2018  Narrative: Examination: Bilateral Digital Screening Mammogram with Computer Aided Detection, Bilateral Digital Breast Tomosynthesis  Date of Exam:  5/16/2018 11:37 AM CDT  Indication: Screen  Comparison: Mammogram dated 01/20/2015, 12/10/2013  Self-reported family history of breast cancer: None reported.  Technique: 2D  and 3 D bilateral digital mammogram images were obtained.   Findings: Breast Density Category (QDC) = B  There are scattered areas of fibroglandular density. No suspicious findings are present.      Impression: Impression: 1.  No mammographic evidence of malignancy. 2.  BI-RADS Final Assessment Category 1: Negative 3.  Recommend annual screening mammography.     This report was finalized on 05/16/2018 11:39 by Dr. Alison Paul MD.           Assessment/Plan        Anemia secondary to chronic kidney disease, Stage III. Patient has not required VALERIE therapy this far.  2. Iron deficiency in the setting of chronic kidney disease, has benefitted from Feraheme. Had Feraheme in January, hemoglobin has  now near normalized at 10.3.  Plan continue watchful waiting return to clinic in 3 months time and check another CBC and iron studies.  If the iron is still low I may give additional intravenous iron.  Her CMS guidelines I cannot give her Procrit unless hemoglobin is less than 10.    Clinically patient asymptomatic.  I will redraw her iron studies today.  Hemoglobin is a 10.  Return to clinic in 3 months time.            Marco A  MD Kameron    5/30/2018    2:30 PM             [facility]  HEMATOLOGY & ONCOLOGY        Subjective     VISIT DIAGNOSIS:   Encounter Diagnosis   Name Primary?   • Anemia in stage 3 chronic kidney disease        REASON FOR VISIT:     No chief complaint on file.       HEMATOLOGY / ONCOLOGY HISTORY:Ms. Shrestha is a 55 year old female with past medical history of hypertension, chronic kidney disease, diabetes insulindependent  type 2   No history exists.     [No treatment plan]  Cancer Staging Information:  No matching staging information was found for the patient.      INTERVAL HISTORY  Patient ID: Maria C Shrestha is a 58 y.o. year old female   Anemia secondary to chronic kidney disease, Stage III. Patient has not required VALERIE therapy this far.  2. Iron deficiency in the setting of chronic kidney disease, has benefitted from Feraheme. Had Feraheme in January, hemoglobin has  now near normalized at 10.7        Review of Systems   Constitutional: Negative.    HENT: Negative.    Eyes: Negative.    Respiratory: Negative.    Cardiovascular: Negative.    Gastrointestinal: Negative.    Endocrine: Negative.    Genitourinary: Negative.    Musculoskeletal: Negative.    Skin: Negative.    Allergic/Immunologic: Negative.    Neurological: Negative.    Hematological: Negative.    Psychiatric/Behavioral: Negative.             Medications:    Current Outpatient Prescriptions   Medication Sig Dispense Refill   • allopurinol (ZYLOPRIM) 100 MG tablet Take 100 mg by mouth 2 (Two) Times a Day.     • aspirin 81 MG tablet Take 81 mg by mouth Daily.     • B-D ULTRA-FINE 33 LANCETS misc Use 4 x daily 120 each 11   • busPIRone (BUSPAR) 10 MG tablet buspirone 10 mg tablet   Take 2 tablets twice a day by oral route as needed for 90 days.     • calcitriol (ROCALTROL) 0.25 MCG capsule Take 0.25 mcg by mouth 3 (Three) Times a Week.     • Calcium Carb-Cholecalciferol (CALCIUM-VITAMIN D3) 600-400 MG-UNIT tablet Take 1 tablet by mouth 2  "(Two) Times a Day.     • carvedilol (COREG) 3.125 MG tablet Take 3.125 mg by mouth 2 (Two) Times a Day.     • cetirizine (zyrTEC) 10 MG tablet Take 10 mg by mouth As Needed.     • cholecalciferol (VITAMIN D3) 72724 UNITS capsule Take 50,000 Units by mouth Every 7 (Seven) Days.     • Cholecalciferol (VITAMIN D3) 04667 units tablet Vitamin D2 50,000 unit capsule   Take 1 capsule every week by oral route.     • citalopram (CeleXA) 20 MG tablet Take 20 mg by mouth Daily.     • fluticasone (VERAMYST) 27.5 MCG/SPRAY nasal spray 2 sprays into each nostril Daily.     • furosemide (LASIX) 40 MG tablet Take 1 tablet by mouth Daily. (Patient taking differently: Take 40 mg by mouth As Needed.) 90 tablet 3   • Glucose Blood (BLOOD GLUCOSE TEST) strip Use 4 x daily, use any brand covered by insurance or same brand as before 360 each 3   • HUMULIN R 500 UNIT/ML CONCENTRATED injection USING A U100 SYRINGE DRAW UP TO THE DIRECTED UNIT ROXI AND INJECT FOUR TIMES A DAY (UP TO 30 UNIT ROXI FOUR TIMES A DAY) 100 mL 3   • insulin glargine (LANTUS) 100 UNIT/ML injection 200 units daily 18 each 3   • Insulin Syringe 31G X 5/16\" 1 ML misc 4 x daily 120 each 11   • lamoTRIgine (LaMICtal) 50 MG tablet dispersible disintegrating tablet Take 50 mg by mouth Daily.     • levothyroxine (SYNTHROID, LEVOTHROID) 50 MCG tablet Take 1 tablet by mouth Daily. 90 tablet 1   • lisinopril (PRINIVIL,ZESTRIL) 20 MG tablet Take 20 mg by mouth 2 (Two) Times a Day.     • medroxyPROGESTERone (PROVERA) 10 MG tablet Take 3 tabs daily for 3 days, then 2 daily for 3 days, then one daily 30 tablet 0   • Multiple Vitamins-Minerals (MULTIVITAMIN ADULT PO) Take 1 tablet by mouth Daily.     • potassium gluconate 595 (99 K) MG tablet tablet potassium gluconate 595 mg (99 mg) tablet   Take 2 tablets every day by oral route.     • potassium gluconate 595 MG tablet tablet Take 595 mg by mouth 2 (Two) Times a Day.     • rosuvastatin (CRESTOR) 10 MG tablet Take 10 mg by mouth " Daily.     • sodium bicarbonate 650 MG tablet Take 650 mg by mouth 3 (Three) Times a Day.     • TRULICITY 1.5 MG/0.5ML solution pen-injector INJECT 1.5 MG UNDER THE SKIN EVERY 7 DAYS 6 mL 3     No current facility-administered medications for this visit.        ALLERGIES:    Allergies   Allergen Reactions   • Codeine Nausea Only       Objective      @VITALS    Current Status 5/30/2018   ECOG score 0       General Appearance: Patient is awake, alert, oriented and in no acute distress. Patient is welldeveloped, wellnourished, and appears stated age.  HEENT: Normocephalic. Sclerae clear, conjunctiva pink, extraocular movements intact, pupils, round, reactive to light and  accommodation. Mouth and throat are clear with moist oral mucosa.  NECK: Supple, no jugular venous distention, thyroid not enlarged.  LYMPH: No cervical, supraclavicular, axillary, or inguinal lymphadenopathy.  CHEST: Equal bilateral expansion, AP  diameter normal, resonant percussion note  LUNGS: Good air movement, no rales, rhonchi, rubs or wheezes with auscultation  CARDIO: Regular sinus rhythm, no murmurs, gallops or rubs.  ABDOMEN: Nondistended, soft, No tenderness, no guarding, no rebound, No hepatosplenomegaly. No abdominal masses. Bowel sounds positive. No hernia  GENITALIA: Not examined.  BREASTS: Not examined.  MUSKEL: No joint swelling, decreased motion, or inflammation  EXTREMS: No edema, clubbing, cyanosis, No varicose veins.  NEURO: Grossly nonfocal, Gait is coordinated and smooth, Cognition is preserved.  SKIN: No rashes, no ecchymoses, no petechia.  PSYCH: Oriented to time, place and person. Memory is preserved. Mood and affect appear normal      RECENT LABS:  Lab on 05/30/2018   Component Date Value Ref Range Status   • Iron 05/30/2018 63  42 - 180 mcg/dL Final   • TIBC 05/30/2018 300  225 - 420 mcg/dL Final   • Iron Saturation 05/30/2018 21  20 - 45 % Final   • Glucose 05/30/2018 208* 70 - 100 mg/dL Final   • BUN 05/30/2018 33* 5 -  21 mg/dL Final   • Creatinine 05/30/2018 2.18* 0.50 - 1.40 mg/dL Final   • Sodium 05/30/2018 140  135 - 145 mmol/L Final   • Potassium 05/30/2018 4.5  3.5 - 5.3 mmol/L Final   • Chloride 05/30/2018 101  98 - 110 mmol/L Final   • CO2 05/30/2018 28.0  24.0 - 31.0 mmol/L Final   • Calcium 05/30/2018 10.6* 8.4 - 10.4 mg/dL Final   • Total Protein 05/30/2018 7.0  6.3 - 8.7 g/dL Final   • Albumin 05/30/2018 3.90  3.50 - 5.00 g/dL Final   • ALT (SGPT) 05/30/2018 19  0 - 54 U/L Final   • AST (SGOT) 05/30/2018 22  7 - 45 U/L Final   • Alkaline Phosphatase 05/30/2018 60  24 - 120 U/L Final   • Total Bilirubin 05/30/2018 0.3  0.1 - 1.0 mg/dL Final   • eGFR Non African Amer 05/30/2018 23* >60 mL/min/1.73 Final   • Globulin 05/30/2018 3.1  gm/dL Final   • A/G Ratio 05/30/2018 1.3  1.1 - 2.5 g/dL Final   • BUN/Creatinine Ratio 05/30/2018 15.1  7.0 - 25.0 Final   • Anion Gap 05/30/2018 11.0  4.0 - 13.0 mmol/L Final   • WBC 05/30/2018 9.98  4.80 - 10.80 10*3/mm3 Final   • RBC 05/30/2018 3.12* 4.20 - 5.40 10*6/mm3 Final   • Hemoglobin 05/30/2018 9.4* 12.0 - 16.0 g/dL Final   • Hematocrit 05/30/2018 28.6* 37.0 - 47.0 % Final   • MCV 05/30/2018 91.7  82.0 - 98.0 fL Final   • MCH 05/30/2018 30.1  28.0 - 32.0 pg Final   • MCHC 05/30/2018 32.9* 33.0 - 36.0 g/dL Final   • RDW 05/30/2018 17.2* 12.0 - 15.0 % Final   • RDW-SD 05/30/2018 57.8* 40.0 - 54.0 fl Final   • MPV 05/30/2018 10.2  6.0 - 12.0 fL Final   • Platelets 05/30/2018 214  130 - 400 10*3/mm3 Final   • Neutrophil % 05/30/2018 73.2  39.0 - 78.0 % Final   • Lymphocyte % 05/30/2018 18.4  15.0 - 45.0 % Final   • Monocyte % 05/30/2018 5.3  4.0 - 12.0 % Final   • Eosinophil % 05/30/2018 2.4  0.0 - 4.0 % Final   • Basophil % 05/30/2018 0.2  0.0 - 2.0 % Final   • Immature Grans % 05/30/2018 0.5  0.0 - 5.0 % Final   • Neutrophils, Absolute 05/30/2018 7.30  1.87 - 8.40 10*3/mm3 Final   • Lymphocytes, Absolute 05/30/2018 1.84  0.72 - 4.86 10*3/mm3 Final   • Monocytes, Absolute 05/30/2018  0.53  0.19 - 1.30 10*3/mm3 Final   • Eosinophils, Absolute 05/30/2018 0.24  0.00 - 0.70 10*3/mm3 Final   • Basophils, Absolute 05/30/2018 0.02  0.00 - 0.20 10*3/mm3 Final   • Immature Grans, Absolute 05/30/2018 0.05* 0.00 - 0.03 10*3/mm3 Final   • nRBC 05/30/2018 0.0  0.0 - 0.0 /100 WBC Final   • Extra Tube 05/30/2018 Hold for add-ons.   Final    Auto resulted.   • Extra Tube 05/30/2018 Hold for add-ons.   Final    Auto resulted.       RADIOLOGY:  Mammo Screening Digital Tomosynthesis Bilateral With Cad    Result Date: 5/16/2018  Narrative: Examination: Bilateral Digital Screening Mammogram with Computer Aided Detection, Bilateral Digital Breast Tomosynthesis  Date of Exam:  5/16/2018 11:37 AM CDT  Indication: Screen  Comparison: Mammogram dated 01/20/2015, 12/10/2013  Self-reported family history of breast cancer: None reported.  Technique: 2D  and 3 D bilateral digital mammogram images were obtained.   Findings: Breast Density Category (QDC) = B  There are scattered areas of fibroglandular density. No suspicious findings are present.      Impression: Impression: 1.  No mammographic evidence of malignancy. 2.  BI-RADS Final Assessment Category 1: Negative 3.  Recommend annual screening mammography.     This report was finalized on 05/16/2018 11:39 by Dr. Alison Paul MD.           Assessment/Plan        Anemia secondary to chronic kidney disease, Stage III. Patient has not required VALERIE therapy this far.  2. Iron deficiency in the setting of chronic kidney disease, has benefitted from Feraheme. Had Feraheme in January, hemoglobin has  now near normalized at 10.2.  Plan continue watchful waiting return to clinic in 3 months time and check another CBC and iron studies.  If the iron is still low I may give additional intravenous iron.  .    Clinically patient asymptomatic.  I will redraw her iron studies today.  Hemoglobin is a 10.3gm Today. Per CMS guidelines Rx Procrit 40,000 u s/q every month.       Marco A Melo  MD    5/30/2018    2:30 PM             [facility]  HEMATOLOGY & ONCOLOGY        Subjective     VISIT DIAGNOSIS:   Encounter Diagnosis   Name Primary?   • Anemia in stage 3 chronic kidney disease        REASON FOR VISIT:     No chief complaint on file.       HEMATOLOGY / ONCOLOGY HISTORY:Ms. Shrestha is a 55 year old female with past medical history of hypertension, chronic kidney disease, diabetes insulindependent  type 2   No history exists.     [No treatment plan]  Cancer Staging Information:  No matching staging information was found for the patient.      INTERVAL HISTORY  Patient ID: Maria C Shrestha is a 58 y.o. year old female   Anemia secondary to chronic kidney disease, Stage III. Patient has not required VALERIE therapy this far.  2. Iron deficiency in the setting of chronic kidney disease, has benefitted from Feraheme. Had Feraheme in January, hemoglobin has  now near normalized at 10.7        Review of Systems   Constitutional: Negative.    HENT: Negative.    Eyes: Negative.    Respiratory: Negative.    Cardiovascular: Negative.    Gastrointestinal: Negative.    Endocrine: Negative.    Genitourinary: Negative.    Musculoskeletal: Negative.    Skin: Negative.    Allergic/Immunologic: Negative.    Neurological: Negative.    Hematological: Negative.    Psychiatric/Behavioral: Negative.             Medications:    Current Outpatient Prescriptions   Medication Sig Dispense Refill   • allopurinol (ZYLOPRIM) 100 MG tablet Take 100 mg by mouth 2 (Two) Times a Day.     • aspirin 81 MG tablet Take 81 mg by mouth Daily.     • B-D ULTRA-FINE 33 LANCETS misc Use 4 x daily 120 each 11   • busPIRone (BUSPAR) 10 MG tablet buspirone 10 mg tablet   Take 2 tablets twice a day by oral route as needed for 90 days.     • calcitriol (ROCALTROL) 0.25 MCG capsule Take 0.25 mcg by mouth 3 (Three) Times a Week.     • Calcium Carb-Cholecalciferol (CALCIUM-VITAMIN D3) 600-400 MG-UNIT tablet Take 1 tablet by mouth 2 (Two)  "Times a Day.     • carvedilol (COREG) 3.125 MG tablet Take 3.125 mg by mouth 2 (Two) Times a Day.     • cetirizine (zyrTEC) 10 MG tablet Take 10 mg by mouth As Needed.     • cholecalciferol (VITAMIN D3) 16203 UNITS capsule Take 50,000 Units by mouth Every 7 (Seven) Days.     • Cholecalciferol (VITAMIN D3) 29382 units tablet Vitamin D2 50,000 unit capsule   Take 1 capsule every week by oral route.     • citalopram (CeleXA) 20 MG tablet Take 20 mg by mouth Daily.     • fluticasone (VERAMYST) 27.5 MCG/SPRAY nasal spray 2 sprays into each nostril Daily.     • furosemide (LASIX) 40 MG tablet Take 1 tablet by mouth Daily. (Patient taking differently: Take 40 mg by mouth As Needed.) 90 tablet 3   • Glucose Blood (BLOOD GLUCOSE TEST) strip Use 4 x daily, use any brand covered by insurance or same brand as before 360 each 3   • HUMULIN R 500 UNIT/ML CONCENTRATED injection USING A U100 SYRINGE DRAW UP TO THE DIRECTED UNIT ROXI AND INJECT FOUR TIMES A DAY (UP TO 30 UNIT ROXI FOUR TIMES A DAY) 100 mL 3   • insulin glargine (LANTUS) 100 UNIT/ML injection 200 units daily 18 each 3   • Insulin Syringe 31G X 5/16\" 1 ML misc 4 x daily 120 each 11   • lamoTRIgine (LaMICtal) 50 MG tablet dispersible disintegrating tablet Take 50 mg by mouth Daily.     • levothyroxine (SYNTHROID, LEVOTHROID) 50 MCG tablet Take 1 tablet by mouth Daily. 90 tablet 1   • lisinopril (PRINIVIL,ZESTRIL) 20 MG tablet Take 20 mg by mouth 2 (Two) Times a Day.     • medroxyPROGESTERone (PROVERA) 10 MG tablet Take 3 tabs daily for 3 days, then 2 daily for 3 days, then one daily 30 tablet 0   • Multiple Vitamins-Minerals (MULTIVITAMIN ADULT PO) Take 1 tablet by mouth Daily.     • potassium gluconate 595 (99 K) MG tablet tablet potassium gluconate 595 mg (99 mg) tablet   Take 2 tablets every day by oral route.     • potassium gluconate 595 MG tablet tablet Take 595 mg by mouth 2 (Two) Times a Day.     • rosuvastatin (CRESTOR) 10 MG tablet Take 10 mg by mouth Daily.  "    • sodium bicarbonate 650 MG tablet Take 650 mg by mouth 3 (Three) Times a Day.     • TRULICITY 1.5 MG/0.5ML solution pen-injector INJECT 1.5 MG UNDER THE SKIN EVERY 7 DAYS 6 mL 3     No current facility-administered medications for this visit.        ALLERGIES:    Allergies   Allergen Reactions   • Codeine Nausea Only       Objective      @VITALS    Current Status 5/30/2018   ECOG score 0       General Appearance: Patient is awake, alert, oriented and in no acute distress. Patient is welldeveloped, wellnourished, and appears stated age.  HEENT: Normocephalic. Sclerae clear, conjunctiva pink, extraocular movements intact, pupils, round, reactive to light and  accommodation. Mouth and throat are clear with moist oral mucosa.  NECK: Supple, no jugular venous distention, thyroid not enlarged.  LYMPH: No cervical, supraclavicular, axillary, or inguinal lymphadenopathy.  CHEST: Equal bilateral expansion, AP  diameter normal, resonant percussion note  LUNGS: Good air movement, no rales, rhonchi, rubs or wheezes with auscultation  CARDIO: Regular sinus rhythm, no murmurs, gallops or rubs.  ABDOMEN: Nondistended, soft, No tenderness, no guarding, no rebound, No hepatosplenomegaly. No abdominal masses. Bowel sounds positive. No hernia  GENITALIA: Not examined.  BREASTS: Not examined.  MUSKEL: No joint swelling, decreased motion, or inflammation  EXTREMS: No edema, clubbing, cyanosis, No varicose veins.  NEURO: Grossly nonfocal, Gait is coordinated and smooth, Cognition is preserved.  SKIN: No rashes, no ecchymoses, no petechia.  PSYCH: Oriented to time, place and person. Memory is preserved. Mood and affect appear normal      RECENT LABS:  Lab on 05/30/2018   Component Date Value Ref Range Status   • Iron 05/30/2018 63  42 - 180 mcg/dL Final   • TIBC 05/30/2018 300  225 - 420 mcg/dL Final   • Iron Saturation 05/30/2018 21  20 - 45 % Final   • Glucose 05/30/2018 208* 70 - 100 mg/dL Final   • BUN 05/30/2018 33* 5 - 21 mg/dL  Final   • Creatinine 05/30/2018 2.18* 0.50 - 1.40 mg/dL Final   • Sodium 05/30/2018 140  135 - 145 mmol/L Final   • Potassium 05/30/2018 4.5  3.5 - 5.3 mmol/L Final   • Chloride 05/30/2018 101  98 - 110 mmol/L Final   • CO2 05/30/2018 28.0  24.0 - 31.0 mmol/L Final   • Calcium 05/30/2018 10.6* 8.4 - 10.4 mg/dL Final   • Total Protein 05/30/2018 7.0  6.3 - 8.7 g/dL Final   • Albumin 05/30/2018 3.90  3.50 - 5.00 g/dL Final   • ALT (SGPT) 05/30/2018 19  0 - 54 U/L Final   • AST (SGOT) 05/30/2018 22  7 - 45 U/L Final   • Alkaline Phosphatase 05/30/2018 60  24 - 120 U/L Final   • Total Bilirubin 05/30/2018 0.3  0.1 - 1.0 mg/dL Final   • eGFR Non African Amer 05/30/2018 23* >60 mL/min/1.73 Final   • Globulin 05/30/2018 3.1  gm/dL Final   • A/G Ratio 05/30/2018 1.3  1.1 - 2.5 g/dL Final   • BUN/Creatinine Ratio 05/30/2018 15.1  7.0 - 25.0 Final   • Anion Gap 05/30/2018 11.0  4.0 - 13.0 mmol/L Final   • WBC 05/30/2018 9.98  4.80 - 10.80 10*3/mm3 Final   • RBC 05/30/2018 3.12* 4.20 - 5.40 10*6/mm3 Final   • Hemoglobin 05/30/2018 9.4* 12.0 - 16.0 g/dL Final   • Hematocrit 05/30/2018 28.6* 37.0 - 47.0 % Final   • MCV 05/30/2018 91.7  82.0 - 98.0 fL Final   • MCH 05/30/2018 30.1  28.0 - 32.0 pg Final   • MCHC 05/30/2018 32.9* 33.0 - 36.0 g/dL Final   • RDW 05/30/2018 17.2* 12.0 - 15.0 % Final   • RDW-SD 05/30/2018 57.8* 40.0 - 54.0 fl Final   • MPV 05/30/2018 10.2  6.0 - 12.0 fL Final   • Platelets 05/30/2018 214  130 - 400 10*3/mm3 Final   • Neutrophil % 05/30/2018 73.2  39.0 - 78.0 % Final   • Lymphocyte % 05/30/2018 18.4  15.0 - 45.0 % Final   • Monocyte % 05/30/2018 5.3  4.0 - 12.0 % Final   • Eosinophil % 05/30/2018 2.4  0.0 - 4.0 % Final   • Basophil % 05/30/2018 0.2  0.0 - 2.0 % Final   • Immature Grans % 05/30/2018 0.5  0.0 - 5.0 % Final   • Neutrophils, Absolute 05/30/2018 7.30  1.87 - 8.40 10*3/mm3 Final   • Lymphocytes, Absolute 05/30/2018 1.84  0.72 - 4.86 10*3/mm3 Final   • Monocytes, Absolute 05/30/2018 0.53  0.19  - 1.30 10*3/mm3 Final   • Eosinophils, Absolute 05/30/2018 0.24  0.00 - 0.70 10*3/mm3 Final   • Basophils, Absolute 05/30/2018 0.02  0.00 - 0.20 10*3/mm3 Final   • Immature Grans, Absolute 05/30/2018 0.05* 0.00 - 0.03 10*3/mm3 Final   • nRBC 05/30/2018 0.0  0.0 - 0.0 /100 WBC Final   • Extra Tube 05/30/2018 Hold for add-ons.   Final    Auto resulted.   • Extra Tube 05/30/2018 Hold for add-ons.   Final    Auto resulted.       RADIOLOGY:  Mammo Screening Digital Tomosynthesis Bilateral With Cad    Result Date: 5/16/2018  Narrative: Examination: Bilateral Digital Screening Mammogram with Computer Aided Detection, Bilateral Digital Breast Tomosynthesis  Date of Exam:  5/16/2018 11:37 AM CDT  Indication: Screen  Comparison: Mammogram dated 01/20/2015, 12/10/2013  Self-reported family history of breast cancer: None reported.  Technique: 2D  and 3 D bilateral digital mammogram images were obtained.   Findings: Breast Density Category (QDC) = B  There are scattered areas of fibroglandular density. No suspicious findings are present.      Impression: Impression: 1.  No mammographic evidence of malignancy. 2.  BI-RADS Final Assessment Category 1: Negative 3.  Recommend annual screening mammography.     This report was finalized on 05/16/2018 11:39 by Dr. Alison Paul MD.           Assessment/Plan        Anemia secondary to chronic kidney disease, Stage III. Patient has not required VALERIE therapy this far.  2. Iron deficiency in the setting of chronic kidney disease, has benefitted from Feraheme. Had Feraheme in January, hemoglobin has  now near normalized at 10.3.  Plan continue watchful waiting return to clinic in 3 months time and check another CBC and iron studies.  If the iron is still low I may give additional intravenous iron.  Her CMS guidelines I cannot give her Procrit unless hemoglobin is less than 10.    Clinically patient asymptomatic.  I will redraw her iron studies today.  Hemoglobin is a 10.  Return to clinic  in 3 months time.            Marco A Melo MD    5/30/2018    2:30 PM             [facility]  HEMATOLOGY & ONCOLOGY        Subjective     VISIT DIAGNOSIS:   Encounter Diagnosis   Name Primary?   • Anemia in stage 3 chronic kidney disease        REASON FOR VISIT:     No chief complaint on file.       HEMATOLOGY / ONCOLOGY HISTORY:Ms. Shrestha is a 55 year old female with past medical history of hypertension, chronic kidney disease, diabetes insulindependent  type 2   No history exists.     [No treatment plan]  Cancer Staging Information:  No matching staging information was found for the patient.      INTERVAL HISTORY  Patient ID: Maria C Shrestha is a 58 y.o. year old female   Anemia secondary to chronic kidney disease, Stage III. Patient has not required VALERIE therapy this far.  2. Iron deficiency in the setting of chronic kidney disease, has benefitted from Feraheme. Had Feraheme in January, hemoglobin has  now near normalized at 10.7        Review of Systems   Constitutional: Negative.    HENT: Negative.    Eyes: Negative.    Respiratory: Negative.    Cardiovascular: Negative.    Gastrointestinal: Negative.    Endocrine: Negative.    Genitourinary: Negative.    Musculoskeletal: Negative.    Skin: Negative.    Allergic/Immunologic: Negative.    Neurological: Negative.    Hematological: Negative.    Psychiatric/Behavioral: Negative.             Medications:    Current Outpatient Prescriptions   Medication Sig Dispense Refill   • allopurinol (ZYLOPRIM) 100 MG tablet Take 100 mg by mouth 2 (Two) Times a Day.     • aspirin 81 MG tablet Take 81 mg by mouth Daily.     • B-D ULTRA-FINE 33 LANCETS misc Use 4 x daily 120 each 11   • busPIRone (BUSPAR) 10 MG tablet buspirone 10 mg tablet   Take 2 tablets twice a day by oral route as needed for 90 days.     • calcitriol (ROCALTROL) 0.25 MCG capsule Take 0.25 mcg by mouth 3 (Three) Times a Week.     • Calcium Carb-Cholecalciferol (CALCIUM-VITAMIN D3) 600-400 MG-UNIT  "tablet Take 1 tablet by mouth 2 (Two) Times a Day.     • carvedilol (COREG) 3.125 MG tablet Take 3.125 mg by mouth 2 (Two) Times a Day.     • cetirizine (zyrTEC) 10 MG tablet Take 10 mg by mouth As Needed.     • cholecalciferol (VITAMIN D3) 36636 UNITS capsule Take 50,000 Units by mouth Every 7 (Seven) Days.     • Cholecalciferol (VITAMIN D3) 57049 units tablet Vitamin D2 50,000 unit capsule   Take 1 capsule every week by oral route.     • citalopram (CeleXA) 20 MG tablet Take 20 mg by mouth Daily.     • fluticasone (VERAMYST) 27.5 MCG/SPRAY nasal spray 2 sprays into each nostril Daily.     • furosemide (LASIX) 40 MG tablet Take 1 tablet by mouth Daily. (Patient taking differently: Take 40 mg by mouth As Needed.) 90 tablet 3   • Glucose Blood (BLOOD GLUCOSE TEST) strip Use 4 x daily, use any brand covered by insurance or same brand as before 360 each 3   • HUMULIN R 500 UNIT/ML CONCENTRATED injection USING A U100 SYRINGE DRAW UP TO THE DIRECTED UNIT ROXI AND INJECT FOUR TIMES A DAY (UP TO 30 UNIT ROXI FOUR TIMES A DAY) 100 mL 3   • insulin glargine (LANTUS) 100 UNIT/ML injection 200 units daily 18 each 3   • Insulin Syringe 31G X 5/16\" 1 ML misc 4 x daily 120 each 11   • lamoTRIgine (LaMICtal) 50 MG tablet dispersible disintegrating tablet Take 50 mg by mouth Daily.     • levothyroxine (SYNTHROID, LEVOTHROID) 50 MCG tablet Take 1 tablet by mouth Daily. 90 tablet 1   • lisinopril (PRINIVIL,ZESTRIL) 20 MG tablet Take 20 mg by mouth 2 (Two) Times a Day.     • medroxyPROGESTERone (PROVERA) 10 MG tablet Take 3 tabs daily for 3 days, then 2 daily for 3 days, then one daily 30 tablet 0   • Multiple Vitamins-Minerals (MULTIVITAMIN ADULT PO) Take 1 tablet by mouth Daily.     • potassium gluconate 595 (99 K) MG tablet tablet potassium gluconate 595 mg (99 mg) tablet   Take 2 tablets every day by oral route.     • potassium gluconate 595 MG tablet tablet Take 595 mg by mouth 2 (Two) Times a Day.     • rosuvastatin (CRESTOR) 10 " MG tablet Take 10 mg by mouth Daily.     • sodium bicarbonate 650 MG tablet Take 650 mg by mouth 3 (Three) Times a Day.     • TRULICITY 1.5 MG/0.5ML solution pen-injector INJECT 1.5 MG UNDER THE SKIN EVERY 7 DAYS 6 mL 3     No current facility-administered medications for this visit.        ALLERGIES:    Allergies   Allergen Reactions   • Codeine Nausea Only       Objective      @VITALS    Current Status 5/30/2018   ECOG score 0       General Appearance: Patient is awake, alert, oriented and in no acute distress. Patient is welldeveloped, wellnourished, and appears stated age.  HEENT: Normocephalic. Sclerae clear, conjunctiva pink, extraocular movements intact, pupils, round, reactive to light and  accommodation. Mouth and throat are clear with moist oral mucosa.  NECK: Supple, no jugular venous distention, thyroid not enlarged.  LYMPH: No cervical, supraclavicular, axillary, or inguinal lymphadenopathy.  CHEST: Equal bilateral expansion, AP  diameter normal, resonant percussion note  LUNGS: Good air movement, no rales, rhonchi, rubs or wheezes with auscultation  CARDIO: Regular sinus rhythm, no murmurs, gallops or rubs.  ABDOMEN: Nondistended, soft, No tenderness, no guarding, no rebound, No hepatosplenomegaly. No abdominal masses. Bowel sounds positive. No hernia  GENITALIA: Not examined.  BREASTS: Not examined.  MUSKEL: No joint swelling, decreased motion, or inflammation  EXTREMS: No edema, clubbing, cyanosis, No varicose veins.  NEURO: Grossly nonfocal, Gait is coordinated and smooth, Cognition is preserved.  SKIN: No rashes, no ecchymoses, no petechia.  PSYCH: Oriented to time, place and person. Memory is preserved. Mood and affect appear normal      RECENT LABS:  Lab on 05/30/2018   Component Date Value Ref Range Status   • Iron 05/30/2018 63  42 - 180 mcg/dL Final   • TIBC 05/30/2018 300  225 - 420 mcg/dL Final   • Iron Saturation 05/30/2018 21  20 - 45 % Final   • Glucose 05/30/2018 208* 70 - 100 mg/dL  Final   • BUN 05/30/2018 33* 5 - 21 mg/dL Final   • Creatinine 05/30/2018 2.18* 0.50 - 1.40 mg/dL Final   • Sodium 05/30/2018 140  135 - 145 mmol/L Final   • Potassium 05/30/2018 4.5  3.5 - 5.3 mmol/L Final   • Chloride 05/30/2018 101  98 - 110 mmol/L Final   • CO2 05/30/2018 28.0  24.0 - 31.0 mmol/L Final   • Calcium 05/30/2018 10.6* 8.4 - 10.4 mg/dL Final   • Total Protein 05/30/2018 7.0  6.3 - 8.7 g/dL Final   • Albumin 05/30/2018 3.90  3.50 - 5.00 g/dL Final   • ALT (SGPT) 05/30/2018 19  0 - 54 U/L Final   • AST (SGOT) 05/30/2018 22  7 - 45 U/L Final   • Alkaline Phosphatase 05/30/2018 60  24 - 120 U/L Final   • Total Bilirubin 05/30/2018 0.3  0.1 - 1.0 mg/dL Final   • eGFR Non African Amer 05/30/2018 23* >60 mL/min/1.73 Final   • Globulin 05/30/2018 3.1  gm/dL Final   • A/G Ratio 05/30/2018 1.3  1.1 - 2.5 g/dL Final   • BUN/Creatinine Ratio 05/30/2018 15.1  7.0 - 25.0 Final   • Anion Gap 05/30/2018 11.0  4.0 - 13.0 mmol/L Final   • WBC 05/30/2018 9.98  4.80 - 10.80 10*3/mm3 Final   • RBC 05/30/2018 3.12* 4.20 - 5.40 10*6/mm3 Final   • Hemoglobin 05/30/2018 9.4* 12.0 - 16.0 g/dL Final   • Hematocrit 05/30/2018 28.6* 37.0 - 47.0 % Final   • MCV 05/30/2018 91.7  82.0 - 98.0 fL Final   • MCH 05/30/2018 30.1  28.0 - 32.0 pg Final   • MCHC 05/30/2018 32.9* 33.0 - 36.0 g/dL Final   • RDW 05/30/2018 17.2* 12.0 - 15.0 % Final   • RDW-SD 05/30/2018 57.8* 40.0 - 54.0 fl Final   • MPV 05/30/2018 10.2  6.0 - 12.0 fL Final   • Platelets 05/30/2018 214  130 - 400 10*3/mm3 Final   • Neutrophil % 05/30/2018 73.2  39.0 - 78.0 % Final   • Lymphocyte % 05/30/2018 18.4  15.0 - 45.0 % Final   • Monocyte % 05/30/2018 5.3  4.0 - 12.0 % Final   • Eosinophil % 05/30/2018 2.4  0.0 - 4.0 % Final   • Basophil % 05/30/2018 0.2  0.0 - 2.0 % Final   • Immature Grans % 05/30/2018 0.5  0.0 - 5.0 % Final   • Neutrophils, Absolute 05/30/2018 7.30  1.87 - 8.40 10*3/mm3 Final   • Lymphocytes, Absolute 05/30/2018 1.84  0.72 - 4.86 10*3/mm3 Final   •  Monocytes, Absolute 05/30/2018 0.53  0.19 - 1.30 10*3/mm3 Final   • Eosinophils, Absolute 05/30/2018 0.24  0.00 - 0.70 10*3/mm3 Final   • Basophils, Absolute 05/30/2018 0.02  0.00 - 0.20 10*3/mm3 Final   • Immature Grans, Absolute 05/30/2018 0.05* 0.00 - 0.03 10*3/mm3 Final   • nRBC 05/30/2018 0.0  0.0 - 0.0 /100 WBC Final   • Extra Tube 05/30/2018 Hold for add-ons.   Final    Auto resulted.   • Extra Tube 05/30/2018 Hold for add-ons.   Final    Auto resulted.       RADIOLOGY:  Mammo Screening Digital Tomosynthesis Bilateral With Cad    Result Date: 5/16/2018  Narrative: Examination: Bilateral Digital Screening Mammogram with Computer Aided Detection, Bilateral Digital Breast Tomosynthesis  Date of Exam:  5/16/2018 11:37 AM CDT  Indication: Screen  Comparison: Mammogram dated 01/20/2015, 12/10/2013  Self-reported family history of breast cancer: None reported.  Technique: 2D  and 3 D bilateral digital mammogram images were obtained.   Findings: Breast Density Category (QDC) = B  There are scattered areas of fibroglandular density. No suspicious findings are present.      Impression: Impression: 1.  No mammographic evidence of malignancy. 2.  BI-RADS Final Assessment Category 1: Negative 3.  Recommend annual screening mammography.     This report was finalized on 05/16/2018 11:39 by Dr. Alison Paul MD.           Assessment/Plan        Anemia secondary to chronic kidney disease, Stage III. .  2. Iron deficiency in the setting of chronic kidney disease, has benefitted from Feraheme. Had Feraheme in January, hemoglobin has  now near normalized at 10.1.  Plan continue watchful waiting return to clinic in 3 months time and check another CBC and iron studies.  If the iron is still low I may give additional intravenous iron.  .    Clinically patient asymptomatic.  I will redraw her iron studies today.  Hemoglobin is a 9.4gm Today. Per CMS guidelines Rx Procrit 40,000 u s/q every month.    She has been bleeding per  vaginum and is planned D & C. Heavy set Obese women are more prone to Uterine carcinomas, therefore, will check , 9 especially when she tells me her grand mom had Uterine CA ).       Marco A Melo MD    5/30/2018    2:30 PM

## 2018-05-31 LAB — CANCER AG125 SERPL-ACNC: 27.1 U/ML (ref 0–38.1)

## 2018-06-04 RX ORDER — FUROSEMIDE 40 MG/1
40 TABLET ORAL DAILY
Qty: 90 TABLET | Refills: 3 | Status: SHIPPED | OUTPATIENT
Start: 2018-06-04 | End: 2019-01-17

## 2018-06-13 RX ORDER — LANCETS 28 GAUGE
EACH MISCELLANEOUS
Qty: 150 EACH | Refills: 11 | Status: SHIPPED | OUTPATIENT
Start: 2018-06-13 | End: 2020-10-21 | Stop reason: SDUPTHER

## 2018-06-15 ENCOUNTER — OFFICE VISIT (OUTPATIENT)
Dept: OBSTETRICS AND GYNECOLOGY | Facility: CLINIC | Age: 58
End: 2018-06-15

## 2018-06-15 VITALS
BODY MASS INDEX: 48.82 KG/M2 | SYSTOLIC BLOOD PRESSURE: 124 MMHG | WEIGHT: 293 LBS | HEIGHT: 65 IN | DIASTOLIC BLOOD PRESSURE: 84 MMHG

## 2018-06-15 DIAGNOSIS — N95.0 POSTMENOPAUSAL BLEEDING: Primary | ICD-10-CM

## 2018-06-15 PROCEDURE — 88305 TISSUE EXAM BY PATHOLOGIST: CPT | Performed by: OBSTETRICS & GYNECOLOGY

## 2018-06-15 PROCEDURE — 58100 BIOPSY OF UTERUS LINING: CPT | Performed by: OBSTETRICS & GYNECOLOGY

## 2018-06-15 PROCEDURE — 99213 OFFICE O/P EST LOW 20 MIN: CPT | Performed by: OBSTETRICS & GYNECOLOGY

## 2018-06-15 RX ORDER — MEDROXYPROGESTERONE ACETATE 10 MG/1
10 TABLET ORAL DAILY
Qty: 15 TABLET | Refills: 0 | Status: SHIPPED | OUTPATIENT
Start: 2018-06-15 | End: 2018-06-22

## 2018-06-15 NOTE — PROGRESS NOTES
"Maria C Shrestha is a 58 y.o. female here today for follow-up of postmenopausal bleeding.  2 weeks ago I gave her prescription for a Provera taper and she reports that her bleeding stopped on about the third day of medication.  She has not had any further bleeding or cramping.    Visit Vitals  /84 (BP Location: Other (Comment), Patient Position: Sitting, Cuff Size: Large Adult) Comment (BP Location): Right forearm   Ht 165.1 cm (65\")   Wt (!) 184 kg (405 lb)   LMP  (LMP Unknown)   Breastfeeding? No   BMI 67.40 kg/m²     Pleasant female no acute distress  Breathing unlabored  On speculum examination there is no blood in the vagina    An endometrial biopsy was performed in the office today.  The cervix was visualized with a speculum and prepped with Betadine.  The anterior lip was grasped with a tenaculum and a standard endometrial sampling Pipelle was passed into the uterine cavity.  The patient experienced moderate cramping and the uterus sounded to 10 cm.  A large amount of tissue was obtained with 1 pass.  The tenaculum was removed and the site was hemostatic.  She tolerated the procedure well.     Assessment: Postmenopausal bleeding, BMI 67    She is given postbiopsy instructions.  I have refilled her Provera once daily and she will follow-up in 1 week to discuss biopsy results.  She is certainly at risk for hyperplasia or endometrial adenocarcinoma and I am concerned about the amount of tissue obtained on today's biopsy.  "

## 2018-06-15 NOTE — PROGRESS NOTES
Attempted to obtain health maintenance information, patient is followed by Dr. Suarez, [place] for the following items Tdap, Medicare Annual Wellness Exam, Diabetic Eye Exam, Diabetic Foot Exam .

## 2018-06-19 LAB
CYTO UR: NORMAL
LAB AP CASE REPORT: NORMAL
LAB AP CLINICAL INFORMATION: NORMAL
PATH REPORT.FINAL DX SPEC: NORMAL
PATH REPORT.GROSS SPEC: NORMAL

## 2018-06-20 ENCOUNTER — TELEPHONE (OUTPATIENT)
Dept: FAMILY MEDICINE CLINIC | Facility: CLINIC | Age: 58
End: 2018-06-20

## 2018-06-22 ENCOUNTER — OFFICE VISIT (OUTPATIENT)
Dept: OBSTETRICS AND GYNECOLOGY | Facility: CLINIC | Age: 58
End: 2018-06-22

## 2018-06-22 VITALS
HEIGHT: 65 IN | WEIGHT: 293 LBS | SYSTOLIC BLOOD PRESSURE: 132 MMHG | DIASTOLIC BLOOD PRESSURE: 84 MMHG | BODY MASS INDEX: 48.82 KG/M2

## 2018-06-22 DIAGNOSIS — E11.22 TYPE 2 DIABETES MELLITUS WITH STAGE 3 CHRONIC KIDNEY DISEASE, WITH LONG-TERM CURRENT USE OF INSULIN (HCC): Primary | ICD-10-CM

## 2018-06-22 DIAGNOSIS — E53.8 B12 DEFICIENCY: ICD-10-CM

## 2018-06-22 DIAGNOSIS — E11.69 MIXED DIABETIC HYPERLIPIDEMIA ASSOCIATED WITH TYPE 2 DIABETES MELLITUS (HCC): ICD-10-CM

## 2018-06-22 DIAGNOSIS — E03.9 ACQUIRED HYPOTHYROIDISM: ICD-10-CM

## 2018-06-22 DIAGNOSIS — E78.2 MIXED DIABETIC HYPERLIPIDEMIA ASSOCIATED WITH TYPE 2 DIABETES MELLITUS (HCC): ICD-10-CM

## 2018-06-22 DIAGNOSIS — Z79.4 TYPE 2 DIABETES MELLITUS WITH STAGE 3 CHRONIC KIDNEY DISEASE, WITH LONG-TERM CURRENT USE OF INSULIN (HCC): Primary | ICD-10-CM

## 2018-06-22 DIAGNOSIS — N95.0 POSTMENOPAUSAL BLEEDING: Primary | ICD-10-CM

## 2018-06-22 DIAGNOSIS — N18.30 TYPE 2 DIABETES MELLITUS WITH STAGE 3 CHRONIC KIDNEY DISEASE, WITH LONG-TERM CURRENT USE OF INSULIN (HCC): Primary | ICD-10-CM

## 2018-06-22 DIAGNOSIS — E55.9 VITAMIN D DEFICIENCY: ICD-10-CM

## 2018-06-22 PROCEDURE — 99213 OFFICE O/P EST LOW 20 MIN: CPT | Performed by: OBSTETRICS & GYNECOLOGY

## 2018-06-22 NOTE — PROGRESS NOTES
"Maria C Shrestha is a 58 y.o. female here today for follow-up of postmenopausal bleeding.  Last week we performed an endometrial biopsy and she is here to discuss results.  She reports that she has been having a little bit of vaginal bleeding since the biopsy.  A previous ultrasound showed the endometrial stripe to measure 10 mm.  Her pathology report returned showing benign progesterone effect without evidence of hyperplasia or atypia.    Visit Vitals  /84 (BP Location: Left arm, Patient Position: Sitting)   Ht 165.1 cm (65\")   Wt (!) 184 kg (405 lb)   LMP  (LMP Unknown)   BMI 67.40 kg/m²     Pleasant female no acute distress  Mood and affect normal  Breathing unlabored    Assessment: Post-mucosal bleeding, BMI 67    We discussed the benign findings on the endometrial biopsy.  We will proceed with a progesterone withdrawal to try to shed the endometrium.  She is currently on Provera and will stop it.  She will follow-up in a couple of weeks for repeat pelvic ultrasound to see if the endometrium is thin.  We discussed that she should expect an increase in bleeding when she stops the progesterone, for a week or less.    "

## 2018-06-27 ENCOUNTER — INFUSION (OUTPATIENT)
Dept: ONCOLOGY | Facility: HOSPITAL | Age: 58
End: 2018-06-27
Attending: INTERNAL MEDICINE

## 2018-06-27 ENCOUNTER — LAB (OUTPATIENT)
Dept: LAB | Facility: HOSPITAL | Age: 58
End: 2018-06-27
Attending: INTERNAL MEDICINE

## 2018-06-27 ENCOUNTER — OFFICE VISIT (OUTPATIENT)
Dept: ONCOLOGY | Facility: CLINIC | Age: 58
End: 2018-06-27

## 2018-06-27 VITALS
HEIGHT: 65 IN | OXYGEN SATURATION: 98 % | RESPIRATION RATE: 20 BRPM | BODY MASS INDEX: 48.82 KG/M2 | HEART RATE: 75 BPM | DIASTOLIC BLOOD PRESSURE: 67 MMHG | TEMPERATURE: 97.7 F | SYSTOLIC BLOOD PRESSURE: 178 MMHG | WEIGHT: 293 LBS

## 2018-06-27 VITALS
HEART RATE: 80 BPM | SYSTOLIC BLOOD PRESSURE: 138 MMHG | OXYGEN SATURATION: 95 % | WEIGHT: 293 LBS | DIASTOLIC BLOOD PRESSURE: 68 MMHG | RESPIRATION RATE: 16 BRPM | BODY MASS INDEX: 48.82 KG/M2 | TEMPERATURE: 98.2 F | HEIGHT: 65 IN

## 2018-06-27 DIAGNOSIS — N18.30 ANEMIA IN STAGE 3 CHRONIC KIDNEY DISEASE (HCC): ICD-10-CM

## 2018-06-27 DIAGNOSIS — E03.9 HYPOTHYROIDISM, UNSPECIFIED TYPE: Primary | ICD-10-CM

## 2018-06-27 DIAGNOSIS — D63.1 ANEMIA IN STAGE 3 CHRONIC KIDNEY DISEASE (HCC): Primary | ICD-10-CM

## 2018-06-27 DIAGNOSIS — D63.1 ANEMIA IN STAGE 3 CHRONIC KIDNEY DISEASE (HCC): ICD-10-CM

## 2018-06-27 DIAGNOSIS — N18.30 ANEMIA IN STAGE 3 CHRONIC KIDNEY DISEASE (HCC): Primary | ICD-10-CM

## 2018-06-27 LAB
ALBUMIN SERPL-MCNC: 4 G/DL (ref 3.5–5)
ALBUMIN/GLOB SERPL: 1.3 G/DL (ref 1.1–2.5)
ALP SERPL-CCNC: 53 U/L (ref 24–120)
ALT SERPL W P-5'-P-CCNC: 28 U/L (ref 0–54)
ANION GAP SERPL CALCULATED.3IONS-SCNC: 14 MMOL/L (ref 4–13)
AST SERPL-CCNC: 30 U/L (ref 7–45)
BASOPHILS # BLD AUTO: 0.02 10*3/MM3 (ref 0–0.2)
BASOPHILS NFR BLD AUTO: 0.2 % (ref 0–2)
BILIRUB SERPL-MCNC: 0.3 MG/DL (ref 0.1–1)
BUN BLD-MCNC: 45 MG/DL (ref 5–21)
BUN/CREAT SERPL: 20.2 (ref 7–25)
CALCIUM SPEC-SCNC: 12.3 MG/DL (ref 8.4–10.4)
CHLORIDE SERPL-SCNC: 104 MMOL/L (ref 98–110)
CO2 SERPL-SCNC: 23 MMOL/L (ref 24–31)
CREAT BLD-MCNC: 2.23 MG/DL (ref 0.5–1.4)
DEPRECATED RDW RBC AUTO: 55.9 FL (ref 40–54)
EOSINOPHIL # BLD AUTO: 0.21 10*3/MM3 (ref 0–0.7)
EOSINOPHIL NFR BLD AUTO: 1.8 % (ref 0–4)
ERYTHROCYTE [DISTWIDTH] IN BLOOD BY AUTOMATED COUNT: 16.7 % (ref 12–15)
GFR SERPL CREATININE-BSD FRML MDRD: 23 ML/MIN/1.73
GLOBULIN UR ELPH-MCNC: 3.2 GM/DL
GLUCOSE BLD-MCNC: 166 MG/DL (ref 70–100)
HCT VFR BLD AUTO: 29.2 % (ref 37–47)
HGB BLD-MCNC: 9.7 G/DL (ref 12–16)
HOLD SPECIMEN: NORMAL
HOLD SPECIMEN: NORMAL
IMM GRANULOCYTES # BLD: 0.06 10*3/MM3 (ref 0–0.03)
IMM GRANULOCYTES NFR BLD: 0.5 % (ref 0–5)
LYMPHOCYTES # BLD AUTO: 2.1 10*3/MM3 (ref 0.72–4.86)
LYMPHOCYTES NFR BLD AUTO: 17.7 % (ref 15–45)
MCH RBC QN AUTO: 30.3 PG (ref 28–32)
MCHC RBC AUTO-ENTMCNC: 33.2 G/DL (ref 33–36)
MCV RBC AUTO: 91.3 FL (ref 82–98)
MONOCYTES # BLD AUTO: 0.64 10*3/MM3 (ref 0.19–1.3)
MONOCYTES NFR BLD AUTO: 5.4 % (ref 4–12)
NEUTROPHILS # BLD AUTO: 8.86 10*3/MM3 (ref 1.87–8.4)
NEUTROPHILS NFR BLD AUTO: 74.4 % (ref 39–78)
NRBC BLD MANUAL-RTO: 0 /100 WBC (ref 0–0)
PLATELET # BLD AUTO: 235 10*3/MM3 (ref 130–400)
PMV BLD AUTO: 10.6 FL (ref 6–12)
POTASSIUM BLD-SCNC: 4.4 MMOL/L (ref 3.5–5.3)
PROT SERPL-MCNC: 7.2 G/DL (ref 6.3–8.7)
RBC # BLD AUTO: 3.2 10*6/MM3 (ref 4.2–5.4)
SODIUM BLD-SCNC: 141 MMOL/L (ref 135–145)
TSH SERPL DL<=0.05 MIU/L-ACNC: 1.75 MIU/ML (ref 0.47–4.68)
WBC NRBC COR # BLD: 11.89 10*3/MM3 (ref 4.8–10.8)

## 2018-06-27 PROCEDURE — 84443 ASSAY THYROID STIM HORMONE: CPT | Performed by: INTERNAL MEDICINE

## 2018-06-27 PROCEDURE — 96372 THER/PROPH/DIAG INJ SC/IM: CPT

## 2018-06-27 PROCEDURE — 63510000001 EPOETIN ALFA PER 1000 UNITS: Performed by: INTERNAL MEDICINE

## 2018-06-27 PROCEDURE — 99214 OFFICE O/P EST MOD 30 MIN: CPT | Performed by: INTERNAL MEDICINE

## 2018-06-27 PROCEDURE — 36415 COLL VENOUS BLD VENIPUNCTURE: CPT

## 2018-06-27 PROCEDURE — 85025 COMPLETE CBC W/AUTO DIFF WBC: CPT

## 2018-06-27 PROCEDURE — 80053 COMPREHEN METABOLIC PANEL: CPT

## 2018-06-27 RX ADMIN — ERYTHROPOIETIN 40000 UNITS: 40000 INJECTION, SOLUTION INTRAVENOUS; SUBCUTANEOUS at 13:59

## 2018-06-27 NOTE — PROGRESS NOTES
[facility]  HEMATOLOGY & ONCOLOGY        Subjective     VISIT DIAGNOSIS:   Encounter Diagnosis   Name Primary?   • Anemia in stage 3 chronic kidney disease        REASON FOR VISIT:     No chief complaint on file.       HEMATOLOGY / ONCOLOGY HISTORY:Ms. Shrestha is a 55 year old female with past medical history of hypertension, chronic kidney disease, diabetes insulindependent  type 2   No history exists.     [No treatment plan]  Cancer Staging Information:  No matching staging information was found for the patient.      INTERVAL HISTORY  Patient ID: Maria C Shrestha is a 58 y.o. year old female   Anemia secondary to chronic kidney disease, Stage III. Patient has not required VALERIE therapy this far.  2. Iron deficiency in the setting of chronic kidney disease, has benefitted from Feraheme. Had Feraheme in January, hemoglobin has  now near normalized at 10.7        Review of Systems   Constitutional: Negative.    HENT: Negative.    Eyes: Negative.    Respiratory: Negative.    Cardiovascular: Negative.    Gastrointestinal: Negative.    Endocrine: Negative.    Genitourinary: Negative.    Musculoskeletal: Negative.    Skin: Negative.    Allergic/Immunologic: Negative.    Neurological: Negative.    Hematological: Negative.    Psychiatric/Behavioral: Negative.             Medications:    Current Outpatient Prescriptions   Medication Sig Dispense Refill   • allopurinol (ZYLOPRIM) 100 MG tablet Take 100 mg by mouth 2 (Two) Times a Day.     • aspirin 81 MG tablet Take 81 mg by mouth Daily.     • busPIRone (BUSPAR) 10 MG tablet buspirone 10 mg tablet   Take 2 tablets twice a day by oral route as needed for 90 days.     • calcitriol (ROCALTROL) 0.25 MCG capsule Take 0.25 mcg by mouth 3 (Three) Times a Week.     • Calcium Carb-Cholecalciferol (CALCIUM-VITAMIN D3) 600-400 MG-UNIT tablet Take 1 tablet by mouth 2 (Two) Times a Day.     • carvedilol (COREG) 3.125 MG tablet Take 3.125 mg by mouth 2 (Two) Times a Day.     •  "cetirizine (zyrTEC) 10 MG tablet Take 10 mg by mouth As Needed.     • Cholecalciferol (VITAMIN D3) 98898 units tablet Vitamin D2 50,000 unit capsule   Take 1 capsule every week by oral route.     • citalopram (CeleXA) 20 MG tablet Take 20 mg by mouth Daily.     • fluticasone (VERAMYST) 27.5 MCG/SPRAY nasal spray 2 sprays into each nostril Daily.     • furosemide (LASIX) 40 MG tablet Take 1 tablet by mouth Daily. 90 tablet 3   • Glucose Blood (BLOOD GLUCOSE TEST) strip Use 4 x daily, use any brand covered by insurance or same brand as before 360 each 3   • HUMULIN R 500 UNIT/ML CONCENTRATED injection USING A U100 SYRINGE DRAW UP TO THE DIRECTED UNIT ROXI AND INJECT FOUR TIMES A DAY (UP TO 30 UNIT ROXI FOUR TIMES A DAY) 100 mL 3   • insulin glargine (LANTUS) 100 UNIT/ML injection 200 units daily 18 each 3   • Insulin Syringe 31G X 5/16\" 1 ML misc 4 x daily 120 each 11   • lamoTRIgine (LaMICtal) 50 MG tablet dispersible disintegrating tablet Take 50 mg by mouth Daily.     • Lancets (FREESTYLE) lancets FOUR TIMES A  each 11   • levothyroxine (SYNTHROID, LEVOTHROID) 50 MCG tablet Take 1 tablet by mouth Daily. 90 tablet 1   • lisinopril (PRINIVIL,ZESTRIL) 20 MG tablet Take 20 mg by mouth 2 (Two) Times a Day.     • Multiple Vitamins-Minerals (MULTIVITAMIN ADULT PO) Take 1 tablet by mouth Daily.     • potassium gluconate 595 (99 K) MG tablet tablet potassium gluconate 595 mg (99 mg) tablet   Take 2 tablets every day by oral route.     • rosuvastatin (CRESTOR) 10 MG tablet Take 10 mg by mouth Daily.     • sodium bicarbonate 650 MG tablet Take 650 mg by mouth 3 (Three) Times a Day.     • TRULICITY 1.5 MG/0.5ML solution pen-injector INJECT 1.5 MG UNDER THE SKIN EVERY 7 DAYS 6 mL 3     No current facility-administered medications for this visit.        ALLERGIES:    Allergies   Allergen Reactions   • Codeine Nausea Only       Objective      @VITALS    Current Status 6/27/2018   ECOG score 1       General Appearance: " Patient is awake, alert, oriented and in no acute distress. Patient is welldeveloped, wellnourished, and appears stated age.  HEENT: Normocephalic. Sclerae clear, conjunctiva pink, extraocular movements intact, pupils, round, reactive to light and  accommodation. Mouth and throat are clear with moist oral mucosa.  NECK: Supple, no jugular venous distention, thyroid not enlarged.  LYMPH: No cervical, supraclavicular, axillary, or inguinal lymphadenopathy.  CHEST: Equal bilateral expansion, AP  diameter normal, resonant percussion note  LUNGS: Good air movement, no rales, rhonchi, rubs or wheezes with auscultation  CARDIO: Regular sinus rhythm, no murmurs, gallops or rubs.  ABDOMEN: Nondistended, soft, No tenderness, no guarding, no rebound, No hepatosplenomegaly. No abdominal masses. Bowel sounds positive. No hernia  GENITALIA: Not examined.  BREASTS: Not examined.  MUSKEL: No joint swelling, decreased motion, or inflammation  EXTREMS: No edema, clubbing, cyanosis, No varicose veins.  NEURO: Grossly nonfocal, Gait is coordinated and smooth, Cognition is preserved.  SKIN: No rashes, no ecchymoses, no petechia.  PSYCH: Oriented to time, place and person. Memory is preserved. Mood and affect appear normal      RECENT LABS:  Lab on 06/27/2018   Component Date Value Ref Range Status   • Glucose 06/27/2018 166* 70 - 100 mg/dL Final   • BUN 06/27/2018 45* 5 - 21 mg/dL Final   • Creatinine 06/27/2018 2.23* 0.50 - 1.40 mg/dL Final   • Sodium 06/27/2018 141  135 - 145 mmol/L Final   • Potassium 06/27/2018 4.4  3.5 - 5.3 mmol/L Final   • Chloride 06/27/2018 104  98 - 110 mmol/L Final   • CO2 06/27/2018 23.0* 24.0 - 31.0 mmol/L Final   • Calcium 06/27/2018 12.3* 8.4 - 10.4 mg/dL Final   • Total Protein 06/27/2018 7.2  6.3 - 8.7 g/dL Final   • Albumin 06/27/2018 4.00  3.50 - 5.00 g/dL Final   • ALT (SGPT) 06/27/2018 28  0 - 54 U/L Final   • AST (SGOT) 06/27/2018 30  7 - 45 U/L Final   • Alkaline Phosphatase 06/27/2018 53  24 -  120 U/L Final   • Total Bilirubin 06/27/2018 0.3  0.1 - 1.0 mg/dL Final   • eGFR Non African Amer 06/27/2018 23* >60 mL/min/1.73 Final   • Globulin 06/27/2018 3.2  gm/dL Final   • A/G Ratio 06/27/2018 1.3  1.1 - 2.5 g/dL Final   • BUN/Creatinine Ratio 06/27/2018 20.2  7.0 - 25.0 Final   • Anion Gap 06/27/2018 14.0* 4.0 - 13.0 mmol/L Final   • WBC 06/27/2018 11.89* 4.80 - 10.80 10*3/mm3 Final   • RBC 06/27/2018 3.20* 4.20 - 5.40 10*6/mm3 Final   • Hemoglobin 06/27/2018 9.7* 12.0 - 16.0 g/dL Final   • Hematocrit 06/27/2018 29.2* 37.0 - 47.0 % Final   • MCV 06/27/2018 91.3  82.0 - 98.0 fL Final   • MCH 06/27/2018 30.3  28.0 - 32.0 pg Final   • MCHC 06/27/2018 33.2  33.0 - 36.0 g/dL Final   • RDW 06/27/2018 16.7* 12.0 - 15.0 % Final   • RDW-SD 06/27/2018 55.9* 40.0 - 54.0 fl Final   • MPV 06/27/2018 10.6  6.0 - 12.0 fL Final   • Platelets 06/27/2018 235  130 - 400 10*3/mm3 Final   • Neutrophil % 06/27/2018 74.4  39.0 - 78.0 % Final   • Lymphocyte % 06/27/2018 17.7  15.0 - 45.0 % Final   • Monocyte % 06/27/2018 5.4  4.0 - 12.0 % Final   • Eosinophil % 06/27/2018 1.8  0.0 - 4.0 % Final   • Basophil % 06/27/2018 0.2  0.0 - 2.0 % Final   • Immature Grans % 06/27/2018 0.5  0.0 - 5.0 % Final   • Neutrophils, Absolute 06/27/2018 8.86* 1.87 - 8.40 10*3/mm3 Final   • Lymphocytes, Absolute 06/27/2018 2.10  0.72 - 4.86 10*3/mm3 Final   • Monocytes, Absolute 06/27/2018 0.64  0.19 - 1.30 10*3/mm3 Final   • Eosinophils, Absolute 06/27/2018 0.21  0.00 - 0.70 10*3/mm3 Final   • Basophils, Absolute 06/27/2018 0.02  0.00 - 0.20 10*3/mm3 Final   • Immature Grans, Absolute 06/27/2018 0.06* 0.00 - 0.03 10*3/mm3 Final   • nRBC 06/27/2018 0.0  0.0 - 0.0 /100 WBC Final       RADIOLOGY:  No results found.         Assessment/Plan        Anemia secondary to chronic kidney disease, Stage III. Patient has not required VALERIE therapy this far.  2. Iron deficiency in the setting of chronic kidney disease, has benefitted from Feraheme. Had Feraheme in  January, hemoglobin has  now near normalized at 10.3.  Plan continue watchful waiting return to clinic in 3 months time and check another CBC and iron studies.  If the iron is still low I may give additional intravenous iron.  Her CMS guidelines I cannot give her Procrit unless hemoglobin is less than 10.    Clinically patient asymptomatic.  I will redraw her iron studies today.  Hemoglobin is a 10.  Return to clinic in 3 months time.            Marco A Melo MD    6/27/2018    1:18 PM             [facility]  HEMATOLOGY & ONCOLOGY        Subjective     VISIT DIAGNOSIS:   Encounter Diagnosis   Name Primary?   • Anemia in stage 3 chronic kidney disease        REASON FOR VISIT:     No chief complaint on file.       HEMATOLOGY / ONCOLOGY HISTORY:Ms. Shrestha is a 55 year old female with past medical history of hypertension, chronic kidney disease, diabetes insulindependent  type 2   No history exists.     [No treatment plan]  Cancer Staging Information:  No matching staging information was found for the patient.      INTERVAL HISTORY  Patient ID: Maria C Shrestha is a 58 y.o. year old female   Anemia secondary to chronic kidney disease, Stage III. Patient has not required VALERIE therapy this far.  2. Iron deficiency in the setting of chronic kidney disease, has benefitted from Feraheme. Had Feraheme in January, hemoglobin has  now near normalized at 10.7        Review of Systems   Constitutional: Negative.    HENT: Negative.    Eyes: Negative.    Respiratory: Negative.    Cardiovascular: Negative.    Gastrointestinal: Negative.    Endocrine: Negative.    Genitourinary: Negative.    Musculoskeletal: Negative.    Skin: Negative.    Allergic/Immunologic: Negative.    Neurological: Negative.    Hematological: Negative.    Psychiatric/Behavioral: Negative.             Medications:    Current Outpatient Prescriptions   Medication Sig Dispense Refill   • allopurinol (ZYLOPRIM) 100 MG tablet Take 100 mg by mouth 2  "(Two) Times a Day.     • aspirin 81 MG tablet Take 81 mg by mouth Daily.     • busPIRone (BUSPAR) 10 MG tablet buspirone 10 mg tablet   Take 2 tablets twice a day by oral route as needed for 90 days.     • calcitriol (ROCALTROL) 0.25 MCG capsule Take 0.25 mcg by mouth 3 (Three) Times a Week.     • Calcium Carb-Cholecalciferol (CALCIUM-VITAMIN D3) 600-400 MG-UNIT tablet Take 1 tablet by mouth 2 (Two) Times a Day.     • carvedilol (COREG) 3.125 MG tablet Take 3.125 mg by mouth 2 (Two) Times a Day.     • cetirizine (zyrTEC) 10 MG tablet Take 10 mg by mouth As Needed.     • Cholecalciferol (VITAMIN D3) 65054 units tablet Vitamin D2 50,000 unit capsule   Take 1 capsule every week by oral route.     • citalopram (CeleXA) 20 MG tablet Take 20 mg by mouth Daily.     • fluticasone (VERAMYST) 27.5 MCG/SPRAY nasal spray 2 sprays into each nostril Daily.     • furosemide (LASIX) 40 MG tablet Take 1 tablet by mouth Daily. 90 tablet 3   • Glucose Blood (BLOOD GLUCOSE TEST) strip Use 4 x daily, use any brand covered by insurance or same brand as before 360 each 3   • HUMULIN R 500 UNIT/ML CONCENTRATED injection USING A U100 SYRINGE DRAW UP TO THE DIRECTED UNIT ROXI AND INJECT FOUR TIMES A DAY (UP TO 30 UNIT ROXI FOUR TIMES A DAY) 100 mL 3   • insulin glargine (LANTUS) 100 UNIT/ML injection 200 units daily 18 each 3   • Insulin Syringe 31G X 5/16\" 1 ML misc 4 x daily 120 each 11   • lamoTRIgine (LaMICtal) 50 MG tablet dispersible disintegrating tablet Take 50 mg by mouth Daily.     • Lancets (FREESTYLE) lancets FOUR TIMES A  each 11   • levothyroxine (SYNTHROID, LEVOTHROID) 50 MCG tablet Take 1 tablet by mouth Daily. 90 tablet 1   • lisinopril (PRINIVIL,ZESTRIL) 20 MG tablet Take 20 mg by mouth 2 (Two) Times a Day.     • Multiple Vitamins-Minerals (MULTIVITAMIN ADULT PO) Take 1 tablet by mouth Daily.     • potassium gluconate 595 (99 K) MG tablet tablet potassium gluconate 595 mg (99 mg) tablet   Take 2 tablets every day by " oral route.     • rosuvastatin (CRESTOR) 10 MG tablet Take 10 mg by mouth Daily.     • sodium bicarbonate 650 MG tablet Take 650 mg by mouth 3 (Three) Times a Day.     • TRULICITY 1.5 MG/0.5ML solution pen-injector INJECT 1.5 MG UNDER THE SKIN EVERY 7 DAYS 6 mL 3     No current facility-administered medications for this visit.        ALLERGIES:    Allergies   Allergen Reactions   • Codeine Nausea Only       Objective      @VITALS    Current Status 6/27/2018   ECOG score 1       General Appearance: Patient is awake, alert, oriented and in no acute distress. Patient is welldeveloped, wellnourished, and appears stated age.  HEENT: Normocephalic. Sclerae clear, conjunctiva pink, extraocular movements intact, pupils, round, reactive to light and  accommodation. Mouth and throat are clear with moist oral mucosa.  NECK: Supple, no jugular venous distention, thyroid not enlarged.  LYMPH: No cervical, supraclavicular, axillary, or inguinal lymphadenopathy.  CHEST: Equal bilateral expansion, AP  diameter normal, resonant percussion note  LUNGS: Good air movement, no rales, rhonchi, rubs or wheezes with auscultation  CARDIO: Regular sinus rhythm, no murmurs, gallops or rubs.  ABDOMEN: Nondistended, soft, No tenderness, no guarding, no rebound, No hepatosplenomegaly. No abdominal masses. Bowel sounds positive. No hernia  GENITALIA: Not examined.  BREASTS: Not examined.  MUSKEL: No joint swelling, decreased motion, or inflammation  EXTREMS: No edema, clubbing, cyanosis, No varicose veins.  NEURO: Grossly nonfocal, Gait is coordinated and smooth, Cognition is preserved.  SKIN: No rashes, no ecchymoses, no petechia.  PSYCH: Oriented to time, place and person. Memory is preserved. Mood and affect appear normal      RECENT LABS:  Lab on 06/27/2018   Component Date Value Ref Range Status   • Glucose 06/27/2018 166* 70 - 100 mg/dL Final   • BUN 06/27/2018 45* 5 - 21 mg/dL Final   • Creatinine 06/27/2018 2.23* 0.50 - 1.40 mg/dL Final    • Sodium 06/27/2018 141  135 - 145 mmol/L Final   • Potassium 06/27/2018 4.4  3.5 - 5.3 mmol/L Final   • Chloride 06/27/2018 104  98 - 110 mmol/L Final   • CO2 06/27/2018 23.0* 24.0 - 31.0 mmol/L Final   • Calcium 06/27/2018 12.3* 8.4 - 10.4 mg/dL Final   • Total Protein 06/27/2018 7.2  6.3 - 8.7 g/dL Final   • Albumin 06/27/2018 4.00  3.50 - 5.00 g/dL Final   • ALT (SGPT) 06/27/2018 28  0 - 54 U/L Final   • AST (SGOT) 06/27/2018 30  7 - 45 U/L Final   • Alkaline Phosphatase 06/27/2018 53  24 - 120 U/L Final   • Total Bilirubin 06/27/2018 0.3  0.1 - 1.0 mg/dL Final   • eGFR Non African Amer 06/27/2018 23* >60 mL/min/1.73 Final   • Globulin 06/27/2018 3.2  gm/dL Final   • A/G Ratio 06/27/2018 1.3  1.1 - 2.5 g/dL Final   • BUN/Creatinine Ratio 06/27/2018 20.2  7.0 - 25.0 Final   • Anion Gap 06/27/2018 14.0* 4.0 - 13.0 mmol/L Final   • WBC 06/27/2018 11.89* 4.80 - 10.80 10*3/mm3 Final   • RBC 06/27/2018 3.20* 4.20 - 5.40 10*6/mm3 Final   • Hemoglobin 06/27/2018 9.7* 12.0 - 16.0 g/dL Final   • Hematocrit 06/27/2018 29.2* 37.0 - 47.0 % Final   • MCV 06/27/2018 91.3  82.0 - 98.0 fL Final   • MCH 06/27/2018 30.3  28.0 - 32.0 pg Final   • MCHC 06/27/2018 33.2  33.0 - 36.0 g/dL Final   • RDW 06/27/2018 16.7* 12.0 - 15.0 % Final   • RDW-SD 06/27/2018 55.9* 40.0 - 54.0 fl Final   • MPV 06/27/2018 10.6  6.0 - 12.0 fL Final   • Platelets 06/27/2018 235  130 - 400 10*3/mm3 Final   • Neutrophil % 06/27/2018 74.4  39.0 - 78.0 % Final   • Lymphocyte % 06/27/2018 17.7  15.0 - 45.0 % Final   • Monocyte % 06/27/2018 5.4  4.0 - 12.0 % Final   • Eosinophil % 06/27/2018 1.8  0.0 - 4.0 % Final   • Basophil % 06/27/2018 0.2  0.0 - 2.0 % Final   • Immature Grans % 06/27/2018 0.5  0.0 - 5.0 % Final   • Neutrophils, Absolute 06/27/2018 8.86* 1.87 - 8.40 10*3/mm3 Final   • Lymphocytes, Absolute 06/27/2018 2.10  0.72 - 4.86 10*3/mm3 Final   • Monocytes, Absolute 06/27/2018 0.64  0.19 - 1.30 10*3/mm3 Final   • Eosinophils, Absolute 06/27/2018  0.21  0.00 - 0.70 10*3/mm3 Final   • Basophils, Absolute 06/27/2018 0.02  0.00 - 0.20 10*3/mm3 Final   • Immature Grans, Absolute 06/27/2018 0.06* 0.00 - 0.03 10*3/mm3 Final   • nRBC 06/27/2018 0.0  0.0 - 0.0 /100 WBC Final       RADIOLOGY:  No results found.         Assessment/Plan        Anemia secondary to chronic kidney disease, Stage III. Patient has not required VALERIE therapy this far.  2. Iron deficiency in the setting of chronic kidney disease, has benefitted from Feraheme. Had Feraheme in January, hemoglobin has  now near normalized at 10.2.  Plan continue watchful waiting return to clinic in 3 months time and check another CBC and iron studies.  If the iron is still low I may give additional intravenous iron.  .    Clinically patient asymptomatic.  I will redraw her iron studies today.  Hemoglobin is a 10.3gm Today. Per CMS guidelines Rx Procrit 40,000 u s/q every month.       Marco A Melo MD    6/27/2018    1:18 PM             [facility]  HEMATOLOGY & ONCOLOGY        Subjective     VISIT DIAGNOSIS:   Encounter Diagnosis   Name Primary?   • Anemia in stage 3 chronic kidney disease        REASON FOR VISIT:     No chief complaint on file.       HEMATOLOGY / ONCOLOGY HISTORY:Ms. Shrestha is a 55 year old female with past medical history of hypertension, chronic kidney disease, diabetes insulindependent  type 2   No history exists.     [No treatment plan]  Cancer Staging Information:  No matching staging information was found for the patient.      INTERVAL HISTORY  Patient ID: Maria C Shrestha is a 58 y.o. year old female   Anemia secondary to chronic kidney disease, Stage III. Patient has not required VALERIE therapy this far.  2. Iron deficiency in the setting of chronic kidney disease, has benefitted from Feraheme. Had Feraheme in January, hemoglobin has  now near normalized at 10.7        Review of Systems   Constitutional: Negative.    HENT: Negative.    Eyes: Negative.    Respiratory: Negative.   "  Cardiovascular: Negative.    Gastrointestinal: Negative.    Endocrine: Negative.    Genitourinary: Negative.    Musculoskeletal: Negative.    Skin: Negative.    Allergic/Immunologic: Negative.    Neurological: Negative.    Hematological: Negative.    Psychiatric/Behavioral: Negative.             Medications:    Current Outpatient Prescriptions   Medication Sig Dispense Refill   • allopurinol (ZYLOPRIM) 100 MG tablet Take 100 mg by mouth 2 (Two) Times a Day.     • aspirin 81 MG tablet Take 81 mg by mouth Daily.     • busPIRone (BUSPAR) 10 MG tablet buspirone 10 mg tablet   Take 2 tablets twice a day by oral route as needed for 90 days.     • calcitriol (ROCALTROL) 0.25 MCG capsule Take 0.25 mcg by mouth 3 (Three) Times a Week.     • Calcium Carb-Cholecalciferol (CALCIUM-VITAMIN D3) 600-400 MG-UNIT tablet Take 1 tablet by mouth 2 (Two) Times a Day.     • carvedilol (COREG) 3.125 MG tablet Take 3.125 mg by mouth 2 (Two) Times a Day.     • cetirizine (zyrTEC) 10 MG tablet Take 10 mg by mouth As Needed.     • Cholecalciferol (VITAMIN D3) 98721 units tablet Vitamin D2 50,000 unit capsule   Take 1 capsule every week by oral route.     • citalopram (CeleXA) 20 MG tablet Take 20 mg by mouth Daily.     • fluticasone (VERAMYST) 27.5 MCG/SPRAY nasal spray 2 sprays into each nostril Daily.     • furosemide (LASIX) 40 MG tablet Take 1 tablet by mouth Daily. 90 tablet 3   • Glucose Blood (BLOOD GLUCOSE TEST) strip Use 4 x daily, use any brand covered by insurance or same brand as before 360 each 3   • HUMULIN R 500 UNIT/ML CONCENTRATED injection USING A U100 SYRINGE DRAW UP TO THE DIRECTED UNIT ROXI AND INJECT FOUR TIMES A DAY (UP TO 30 UNIT ROXI FOUR TIMES A DAY) 100 mL 3   • insulin glargine (LANTUS) 100 UNIT/ML injection 200 units daily 18 each 3   • Insulin Syringe 31G X 5/16\" 1 ML misc 4 x daily 120 each 11   • lamoTRIgine (LaMICtal) 50 MG tablet dispersible disintegrating tablet Take 50 mg by mouth Daily.     • Lancets " (FREESTYLE) lancets FOUR TIMES A  each 11   • levothyroxine (SYNTHROID, LEVOTHROID) 50 MCG tablet Take 1 tablet by mouth Daily. 90 tablet 1   • lisinopril (PRINIVIL,ZESTRIL) 20 MG tablet Take 20 mg by mouth 2 (Two) Times a Day.     • Multiple Vitamins-Minerals (MULTIVITAMIN ADULT PO) Take 1 tablet by mouth Daily.     • potassium gluconate 595 (99 K) MG tablet tablet potassium gluconate 595 mg (99 mg) tablet   Take 2 tablets every day by oral route.     • rosuvastatin (CRESTOR) 10 MG tablet Take 10 mg by mouth Daily.     • sodium bicarbonate 650 MG tablet Take 650 mg by mouth 3 (Three) Times a Day.     • TRULICITY 1.5 MG/0.5ML solution pen-injector INJECT 1.5 MG UNDER THE SKIN EVERY 7 DAYS 6 mL 3     No current facility-administered medications for this visit.        ALLERGIES:    Allergies   Allergen Reactions   • Codeine Nausea Only       Objective      @VITALS    Current Status 6/27/2018   ECOG score 1       General Appearance: Patient is awake, alert, oriented and in no acute distress. Patient is welldeveloped, wellnourished, and appears stated age.  HEENT: Normocephalic. Sclerae clear, conjunctiva pink, extraocular movements intact, pupils, round, reactive to light and  accommodation. Mouth and throat are clear with moist oral mucosa.  NECK: Supple, no jugular venous distention, thyroid not enlarged.  LYMPH: No cervical, supraclavicular, axillary, or inguinal lymphadenopathy.  CHEST: Equal bilateral expansion, AP  diameter normal, resonant percussion note  LUNGS: Good air movement, no rales, rhonchi, rubs or wheezes with auscultation  CARDIO: Regular sinus rhythm, no murmurs, gallops or rubs.  ABDOMEN: Nondistended, soft, No tenderness, no guarding, no rebound, No hepatosplenomegaly. No abdominal masses. Bowel sounds positive. No hernia  GENITALIA: Not examined.  BREASTS: Not examined.  MUSKEL: No joint swelling, decreased motion, or inflammation  EXTREMS: No edema, clubbing, cyanosis, No varicose  veins.  NEURO: Grossly nonfocal, Gait is coordinated and smooth, Cognition is preserved.  SKIN: No rashes, no ecchymoses, no petechia.  PSYCH: Oriented to time, place and person. Memory is preserved. Mood and affect appear normal      RECENT LABS:  Lab on 06/27/2018   Component Date Value Ref Range Status   • Glucose 06/27/2018 166* 70 - 100 mg/dL Final   • BUN 06/27/2018 45* 5 - 21 mg/dL Final   • Creatinine 06/27/2018 2.23* 0.50 - 1.40 mg/dL Final   • Sodium 06/27/2018 141  135 - 145 mmol/L Final   • Potassium 06/27/2018 4.4  3.5 - 5.3 mmol/L Final   • Chloride 06/27/2018 104  98 - 110 mmol/L Final   • CO2 06/27/2018 23.0* 24.0 - 31.0 mmol/L Final   • Calcium 06/27/2018 12.3* 8.4 - 10.4 mg/dL Final   • Total Protein 06/27/2018 7.2  6.3 - 8.7 g/dL Final   • Albumin 06/27/2018 4.00  3.50 - 5.00 g/dL Final   • ALT (SGPT) 06/27/2018 28  0 - 54 U/L Final   • AST (SGOT) 06/27/2018 30  7 - 45 U/L Final   • Alkaline Phosphatase 06/27/2018 53  24 - 120 U/L Final   • Total Bilirubin 06/27/2018 0.3  0.1 - 1.0 mg/dL Final   • eGFR Non African Amer 06/27/2018 23* >60 mL/min/1.73 Final   • Globulin 06/27/2018 3.2  gm/dL Final   • A/G Ratio 06/27/2018 1.3  1.1 - 2.5 g/dL Final   • BUN/Creatinine Ratio 06/27/2018 20.2  7.0 - 25.0 Final   • Anion Gap 06/27/2018 14.0* 4.0 - 13.0 mmol/L Final   • WBC 06/27/2018 11.89* 4.80 - 10.80 10*3/mm3 Final   • RBC 06/27/2018 3.20* 4.20 - 5.40 10*6/mm3 Final   • Hemoglobin 06/27/2018 9.7* 12.0 - 16.0 g/dL Final   • Hematocrit 06/27/2018 29.2* 37.0 - 47.0 % Final   • MCV 06/27/2018 91.3  82.0 - 98.0 fL Final   • MCH 06/27/2018 30.3  28.0 - 32.0 pg Final   • MCHC 06/27/2018 33.2  33.0 - 36.0 g/dL Final   • RDW 06/27/2018 16.7* 12.0 - 15.0 % Final   • RDW-SD 06/27/2018 55.9* 40.0 - 54.0 fl Final   • MPV 06/27/2018 10.6  6.0 - 12.0 fL Final   • Platelets 06/27/2018 235  130 - 400 10*3/mm3 Final   • Neutrophil % 06/27/2018 74.4  39.0 - 78.0 % Final   • Lymphocyte % 06/27/2018 17.7  15.0 - 45.0 %  Final   • Monocyte % 06/27/2018 5.4  4.0 - 12.0 % Final   • Eosinophil % 06/27/2018 1.8  0.0 - 4.0 % Final   • Basophil % 06/27/2018 0.2  0.0 - 2.0 % Final   • Immature Grans % 06/27/2018 0.5  0.0 - 5.0 % Final   • Neutrophils, Absolute 06/27/2018 8.86* 1.87 - 8.40 10*3/mm3 Final   • Lymphocytes, Absolute 06/27/2018 2.10  0.72 - 4.86 10*3/mm3 Final   • Monocytes, Absolute 06/27/2018 0.64  0.19 - 1.30 10*3/mm3 Final   • Eosinophils, Absolute 06/27/2018 0.21  0.00 - 0.70 10*3/mm3 Final   • Basophils, Absolute 06/27/2018 0.02  0.00 - 0.20 10*3/mm3 Final   • Immature Grans, Absolute 06/27/2018 0.06* 0.00 - 0.03 10*3/mm3 Final   • nRBC 06/27/2018 0.0  0.0 - 0.0 /100 WBC Final       RADIOLOGY:  No results found.         Assessment/Plan        Anemia secondary to chronic kidney disease, Stage III. Patient has not required VALERIE therapy this far.  2. Iron deficiency in the setting of chronic kidney disease, has benefitted from Feraheme. Had Feraheme in January, hemoglobin has  now near normalized at 10.3.  Plan continue watchful waiting return to clinic in 3 months time and check another CBC and iron studies.  If the iron is still low I may give additional intravenous iron.  Her CMS guidelines I cannot give her Procrit unless hemoglobin is less than 10.    Clinically patient asymptomatic.  I will redraw her iron studies today.  Hemoglobin is a 10.  Return to clinic in 3 months time.            Marco A Melo MD    6/27/2018    1:18 PM             [facility]  HEMATOLOGY & ONCOLOGY        Subjective     VISIT DIAGNOSIS:   Encounter Diagnosis   Name Primary?   • Anemia in stage 3 chronic kidney disease        REASON FOR VISIT:     No chief complaint on file.       HEMATOLOGY / ONCOLOGY HISTORY:Ms. Shrestha is a 55 year old female with past medical history of hypertension, chronic kidney disease, diabetes insulindependent  type 2   No history exists.     [No treatment plan]  Cancer Staging Information:  No matching  staging information was found for the patient.      INTERVAL HISTORY  Patient ID: Maria C Shrestha is a 58 y.o. year old female   Anemia secondary to chronic kidney disease, Stage III. Patient has not required VALERIE therapy this far.  2. Iron deficiency in the setting of chronic kidney disease, has benefitted from Feraheme. Had Feraheme in January, hemoglobin has  now near normalized at 10.7        Review of Systems   Constitutional: Negative.    HENT: Negative.    Eyes: Negative.    Respiratory: Negative.    Cardiovascular: Negative.    Gastrointestinal: Negative.    Endocrine: Negative.    Genitourinary: Negative.    Musculoskeletal: Negative.    Skin: Negative.    Allergic/Immunologic: Negative.    Neurological: Negative.    Hematological: Negative.    Psychiatric/Behavioral: Negative.             Medications:    Current Outpatient Prescriptions   Medication Sig Dispense Refill   • allopurinol (ZYLOPRIM) 100 MG tablet Take 100 mg by mouth 2 (Two) Times a Day.     • aspirin 81 MG tablet Take 81 mg by mouth Daily.     • busPIRone (BUSPAR) 10 MG tablet buspirone 10 mg tablet   Take 2 tablets twice a day by oral route as needed for 90 days.     • calcitriol (ROCALTROL) 0.25 MCG capsule Take 0.25 mcg by mouth 3 (Three) Times a Week.     • Calcium Carb-Cholecalciferol (CALCIUM-VITAMIN D3) 600-400 MG-UNIT tablet Take 1 tablet by mouth 2 (Two) Times a Day.     • carvedilol (COREG) 3.125 MG tablet Take 3.125 mg by mouth 2 (Two) Times a Day.     • cetirizine (zyrTEC) 10 MG tablet Take 10 mg by mouth As Needed.     • Cholecalciferol (VITAMIN D3) 53852 units tablet Vitamin D2 50,000 unit capsule   Take 1 capsule every week by oral route.     • citalopram (CeleXA) 20 MG tablet Take 20 mg by mouth Daily.     • fluticasone (VERAMYST) 27.5 MCG/SPRAY nasal spray 2 sprays into each nostril Daily.     • furosemide (LASIX) 40 MG tablet Take 1 tablet by mouth Daily. 90 tablet 3   • Glucose Blood (BLOOD GLUCOSE TEST) strip Use 4 x daily,  "use any brand covered by insurance or same brand as before 360 each 3   • HUMULIN R 500 UNIT/ML CONCENTRATED injection USING A U100 SYRINGE DRAW UP TO THE DIRECTED UNIT ROXI AND INJECT FOUR TIMES A DAY (UP TO 30 UNIT ROXI FOUR TIMES A DAY) 100 mL 3   • insulin glargine (LANTUS) 100 UNIT/ML injection 200 units daily 18 each 3   • Insulin Syringe 31G X 5/16\" 1 ML misc 4 x daily 120 each 11   • lamoTRIgine (LaMICtal) 50 MG tablet dispersible disintegrating tablet Take 50 mg by mouth Daily.     • Lancets (FREESTYLE) lancets FOUR TIMES A  each 11   • levothyroxine (SYNTHROID, LEVOTHROID) 50 MCG tablet Take 1 tablet by mouth Daily. 90 tablet 1   • lisinopril (PRINIVIL,ZESTRIL) 20 MG tablet Take 20 mg by mouth 2 (Two) Times a Day.     • Multiple Vitamins-Minerals (MULTIVITAMIN ADULT PO) Take 1 tablet by mouth Daily.     • potassium gluconate 595 (99 K) MG tablet tablet potassium gluconate 595 mg (99 mg) tablet   Take 2 tablets every day by oral route.     • rosuvastatin (CRESTOR) 10 MG tablet Take 10 mg by mouth Daily.     • sodium bicarbonate 650 MG tablet Take 650 mg by mouth 3 (Three) Times a Day.     • TRULICITY 1.5 MG/0.5ML solution pen-injector INJECT 1.5 MG UNDER THE SKIN EVERY 7 DAYS 6 mL 3     No current facility-administered medications for this visit.        ALLERGIES:    Allergies   Allergen Reactions   • Codeine Nausea Only       Objective      @VITALS    Current Status 6/27/2018   ECOG score 1       General Appearance: Patient is awake, alert, oriented and in no acute distress. Patient is welldeveloped, wellnourished, and appears stated age.  HEENT: Normocephalic. Sclerae clear, conjunctiva pink, extraocular movements intact, pupils, round, reactive to light and  accommodation. Mouth and throat are clear with moist oral mucosa.  NECK: Supple, no jugular venous distention, thyroid not enlarged.  LYMPH: No cervical, supraclavicular, axillary, or inguinal lymphadenopathy.  CHEST: Equal bilateral " expansion, AP  diameter normal, resonant percussion note  LUNGS: Good air movement, no rales, rhonchi, rubs or wheezes with auscultation  CARDIO: Regular sinus rhythm, no murmurs, gallops or rubs.  ABDOMEN: Nondistended, soft, No tenderness, no guarding, no rebound, No hepatosplenomegaly. No abdominal masses. Bowel sounds positive. No hernia  GENITALIA: Not examined.  BREASTS: Not examined.  MUSKEL: No joint swelling, decreased motion, or inflammation  EXTREMS: No edema, clubbing, cyanosis, No varicose veins.  NEURO: Grossly nonfocal, Gait is coordinated and smooth, Cognition is preserved.  SKIN: No rashes, no ecchymoses, no petechia.  PSYCH: Oriented to time, place and person. Memory is preserved. Mood and affect appear normal      RECENT LABS:  Lab on 06/27/2018   Component Date Value Ref Range Status   • Glucose 06/27/2018 166* 70 - 100 mg/dL Final   • BUN 06/27/2018 45* 5 - 21 mg/dL Final   • Creatinine 06/27/2018 2.23* 0.50 - 1.40 mg/dL Final   • Sodium 06/27/2018 141  135 - 145 mmol/L Final   • Potassium 06/27/2018 4.4  3.5 - 5.3 mmol/L Final   • Chloride 06/27/2018 104  98 - 110 mmol/L Final   • CO2 06/27/2018 23.0* 24.0 - 31.0 mmol/L Final   • Calcium 06/27/2018 12.3* 8.4 - 10.4 mg/dL Final   • Total Protein 06/27/2018 7.2  6.3 - 8.7 g/dL Final   • Albumin 06/27/2018 4.00  3.50 - 5.00 g/dL Final   • ALT (SGPT) 06/27/2018 28  0 - 54 U/L Final   • AST (SGOT) 06/27/2018 30  7 - 45 U/L Final   • Alkaline Phosphatase 06/27/2018 53  24 - 120 U/L Final   • Total Bilirubin 06/27/2018 0.3  0.1 - 1.0 mg/dL Final   • eGFR Non African Amer 06/27/2018 23* >60 mL/min/1.73 Final   • Globulin 06/27/2018 3.2  gm/dL Final   • A/G Ratio 06/27/2018 1.3  1.1 - 2.5 g/dL Final   • BUN/Creatinine Ratio 06/27/2018 20.2  7.0 - 25.0 Final   • Anion Gap 06/27/2018 14.0* 4.0 - 13.0 mmol/L Final   • WBC 06/27/2018 11.89* 4.80 - 10.80 10*3/mm3 Final   • RBC 06/27/2018 3.20* 4.20 - 5.40 10*6/mm3 Final   • Hemoglobin 06/27/2018 9.7* 12.0 -  16.0 g/dL Final   • Hematocrit 06/27/2018 29.2* 37.0 - 47.0 % Final   • MCV 06/27/2018 91.3  82.0 - 98.0 fL Final   • MCH 06/27/2018 30.3  28.0 - 32.0 pg Final   • MCHC 06/27/2018 33.2  33.0 - 36.0 g/dL Final   • RDW 06/27/2018 16.7* 12.0 - 15.0 % Final   • RDW-SD 06/27/2018 55.9* 40.0 - 54.0 fl Final   • MPV 06/27/2018 10.6  6.0 - 12.0 fL Final   • Platelets 06/27/2018 235  130 - 400 10*3/mm3 Final   • Neutrophil % 06/27/2018 74.4  39.0 - 78.0 % Final   • Lymphocyte % 06/27/2018 17.7  15.0 - 45.0 % Final   • Monocyte % 06/27/2018 5.4  4.0 - 12.0 % Final   • Eosinophil % 06/27/2018 1.8  0.0 - 4.0 % Final   • Basophil % 06/27/2018 0.2  0.0 - 2.0 % Final   • Immature Grans % 06/27/2018 0.5  0.0 - 5.0 % Final   • Neutrophils, Absolute 06/27/2018 8.86* 1.87 - 8.40 10*3/mm3 Final   • Lymphocytes, Absolute 06/27/2018 2.10  0.72 - 4.86 10*3/mm3 Final   • Monocytes, Absolute 06/27/2018 0.64  0.19 - 1.30 10*3/mm3 Final   • Eosinophils, Absolute 06/27/2018 0.21  0.00 - 0.70 10*3/mm3 Final   • Basophils, Absolute 06/27/2018 0.02  0.00 - 0.20 10*3/mm3 Final   • Immature Grans, Absolute 06/27/2018 0.06* 0.00 - 0.03 10*3/mm3 Final   • nRBC 06/27/2018 0.0  0.0 - 0.0 /100 WBC Final       RADIOLOGY:  No results found.         Assessment/Plan        Anemia secondary to chronic kidney disease, Stage III. .  2. Iron deficiency in the setting of chronic kidney disease, has benefitted from Feraheme. Had Feraheme in January, hemoglobin has  now near normalized at 10.1.  Plan continue watchful waiting return to clinic in 3 months time and check another CBC and iron studies.  If the iron is still low I may give additional intravenous iron.  .    Clinically patient asymptomatic.  I will redraw her iron studies today.  Hemoglobin is a 9.7gm Today. Per CMS guidelines Rx Procrit 40,000 u s/q every month.    She has been bleeding per vaginum and is planned D & C. Heavy set Obese women are more prone to Uterine carcinomas, therefore, will check  , 9 especially when she tells me her grand mom had Uterine CA ).       Marco A Melo MD    6/27/2018    1:18 PM

## 2018-07-05 ENCOUNTER — APPOINTMENT (OUTPATIENT)
Dept: LAB | Facility: HOSPITAL | Age: 58
End: 2018-07-05

## 2018-07-05 LAB
25(OH)D3 SERPL-MCNC: 37.6 NG/ML (ref 30–100)
ALBUMIN SERPL-MCNC: 3.8 G/DL (ref 3.5–5)
ALBUMIN/GLOB SERPL: 1.2 G/DL (ref 1.1–2.5)
ALP SERPL-CCNC: 69 U/L (ref 24–120)
ALT SERPL W P-5'-P-CCNC: 15 U/L (ref 0–54)
ANION GAP SERPL CALCULATED.3IONS-SCNC: 11 MMOL/L (ref 4–13)
AST SERPL-CCNC: 20 U/L (ref 7–45)
AUTO MIXED CELLS #: 0.8 10*3/MM3 (ref 0.1–2.6)
AUTO MIXED CELLS %: 5.7 % (ref 0.1–24)
BILIRUB SERPL-MCNC: 0.3 MG/DL (ref 0.1–1)
BUN BLD-MCNC: 48 MG/DL (ref 5–21)
BUN/CREAT SERPL: 20.2
CALCIUM SPEC-SCNC: 11.2 MG/DL (ref 8.4–10.4)
CHLORIDE SERPL-SCNC: 104 MMOL/L (ref 98–110)
CHOLEST SERPL-MCNC: 149 MG/DL (ref 130–200)
CO2 SERPL-SCNC: 25 MMOL/L (ref 24–31)
CREAT BLD-MCNC: 2.38 MG/DL (ref 0.5–1.4)
ERYTHROCYTE [DISTWIDTH] IN BLOOD BY AUTOMATED COUNT: 17.8 % (ref 12–15)
GFR SERPL CREATININE-BSD FRML MDRD: 21 ML/MIN/1.73
GLOBULIN UR ELPH-MCNC: 3.3 GM/DL
GLUCOSE BLD-MCNC: 243 MG/DL (ref 70–100)
HBA1C MFR BLD: 7.6 %
HCT VFR BLD AUTO: 27.1 % (ref 37–47)
HDLC SERPL-MCNC: 33 MG/DL
HGB BLD-MCNC: 8.9 G/DL (ref 12–16)
LDLC SERPL CALC-MCNC: ABNORMAL MG/DL (ref 0–99)
LDLC/HDLC SERPL: ABNORMAL {RATIO}
LYMPHOCYTES # BLD AUTO: 2.5 10*3/MM3 (ref 0.8–7)
LYMPHOCYTES NFR BLD AUTO: 17.9 % (ref 15–45)
MCH RBC QN AUTO: 30.8 PG (ref 28–32)
MCHC RBC AUTO-ENTMCNC: 32.8 G/DL (ref 33–36)
MCV RBC AUTO: 93.8 FL (ref 82–98)
NEUTROPHILS # BLD AUTO: 10.7 10*3/MM3 (ref 1.5–8.3)
NEUTROPHILS NFR BLD AUTO: 76.4 % (ref 39–78)
PLATELET # BLD AUTO: 263 10*3/MM3 (ref 130–400)
PMV BLD AUTO: 9.3 FL (ref 6–12)
POTASSIUM BLD-SCNC: 4.5 MMOL/L (ref 3.5–5.3)
PROT SERPL-MCNC: 7.1 G/DL (ref 6.3–8.7)
RBC # BLD AUTO: 2.89 10*6/MM3 (ref 4.2–5.4)
SODIUM BLD-SCNC: 140 MMOL/L (ref 135–145)
TRIGL SERPL-MCNC: 419 MG/DL (ref 0–149)
TSH SERPL DL<=0.05 MIU/L-ACNC: 2.38 MIU/ML (ref 0.47–4.68)
VIT B12 BLD-MCNC: 884 PG/ML (ref 239–931)
VLDLC SERPL-MCNC: ABNORMAL MG/DL
WBC NRBC COR # BLD: 14 10*3/MM3 (ref 4.8–10.8)

## 2018-07-05 PROCEDURE — 84443 ASSAY THYROID STIM HORMONE: CPT | Performed by: INTERNAL MEDICINE

## 2018-07-05 PROCEDURE — 82570 ASSAY OF URINE CREATININE: CPT | Performed by: INTERNAL MEDICINE

## 2018-07-05 PROCEDURE — 80061 LIPID PANEL: CPT | Performed by: INTERNAL MEDICINE

## 2018-07-05 PROCEDURE — 80053 COMPREHEN METABOLIC PANEL: CPT | Performed by: INTERNAL MEDICINE

## 2018-07-05 PROCEDURE — 82607 VITAMIN B-12: CPT | Performed by: INTERNAL MEDICINE

## 2018-07-05 PROCEDURE — 82306 VITAMIN D 25 HYDROXY: CPT | Performed by: INTERNAL MEDICINE

## 2018-07-05 PROCEDURE — 82043 UR ALBUMIN QUANTITATIVE: CPT | Performed by: INTERNAL MEDICINE

## 2018-07-05 PROCEDURE — 85025 COMPLETE CBC W/AUTO DIFF WBC: CPT | Performed by: INTERNAL MEDICINE

## 2018-07-05 PROCEDURE — 83036 HEMOGLOBIN GLYCOSYLATED A1C: CPT | Performed by: INTERNAL MEDICINE

## 2018-07-05 PROCEDURE — 36415 COLL VENOUS BLD VENIPUNCTURE: CPT | Performed by: INTERNAL MEDICINE

## 2018-07-06 LAB
CREAT 24H UR-MCNC: 92 MG/DL
MICROALBUMIN UR-MCNC: 346.5 UG/ML
MICROALBUMIN/CREAT UR: 376.6 MG/G CREAT (ref 0–30)

## 2018-07-10 ENCOUNTER — OFFICE VISIT (OUTPATIENT)
Dept: OBSTETRICS AND GYNECOLOGY | Facility: CLINIC | Age: 58
End: 2018-07-10

## 2018-07-10 ENCOUNTER — PROCEDURE VISIT (OUTPATIENT)
Dept: OBSTETRICS AND GYNECOLOGY | Facility: CLINIC | Age: 58
End: 2018-07-10

## 2018-07-10 VITALS
SYSTOLIC BLOOD PRESSURE: 128 MMHG | HEIGHT: 65 IN | WEIGHT: 293 LBS | DIASTOLIC BLOOD PRESSURE: 84 MMHG | BODY MASS INDEX: 48.82 KG/M2

## 2018-07-10 DIAGNOSIS — N95.0 POSTMENOPAUSAL BLEEDING: Primary | ICD-10-CM

## 2018-07-10 DIAGNOSIS — R93.89 THICKENED ENDOMETRIUM: ICD-10-CM

## 2018-07-10 PROCEDURE — 76830 TRANSVAGINAL US NON-OB: CPT | Performed by: OBSTETRICS & GYNECOLOGY

## 2018-07-10 PROCEDURE — 99213 OFFICE O/P EST LOW 20 MIN: CPT | Performed by: OBSTETRICS & GYNECOLOGY

## 2018-07-10 NOTE — PROGRESS NOTES
"Maria C Shrestha is a 58 y.o. female here today for follow-up of postmenopausal bleeding.  She has completed her Provera withdrawal and reports that her bleeding is lighter, but still continues.  She reports that her PCP checked a blood count and that her hemoglobin is 9.4.  Review of records reveals a CBC from July 5 showing hemoglobin of 8.9.  Her previous endometrial biopsy showed benign progestational effect without evidence of hyperplasia or malignancy.  Her previous ultrasound showed a 10 mm endometrial stripe.    Visit Vitals  /84 (BP Location: Other (Comment), Patient Position: Sitting, Cuff Size: Large Adult)   Ht 165.1 cm (65\")   Wt (!) 180 kg (396 lb)   LMP  (LMP Unknown)   Breastfeeding? No   BMI 65.90 kg/m²     Pleasant female no acute distress  Breathing unlabored  Skin without pallor    A pelvic ultrasound was performed in the office today.  The endometrial stripe measures 13 mm.    Assessment: Postmenopausal bleeding    Given her persistent bleeding I recommended that we proceed with a D&C.  We discussed the planned procedure of hysteroscopy with D&C as well as surgical risks of bleeding, infection, uterine perforation, and damage to surrounding structures.  I have given her prescription for Aygestin 5 mg daily to take until the procedure, in hopes that we can reduce and control her bleeding.  She scheduled for surgery on July 25.  "

## 2018-07-10 NOTE — PROGRESS NOTES
Attempted to obtain health maintenance information, patient sees some specialists for her kidneys and diabetes maintenance. Sees PCP for immunizations.

## 2018-07-11 ENCOUNTER — OFFICE VISIT (OUTPATIENT)
Dept: ENDOCRINOLOGY | Facility: CLINIC | Age: 58
End: 2018-07-11

## 2018-07-11 VITALS
HEART RATE: 68 BPM | HEIGHT: 65 IN | DIASTOLIC BLOOD PRESSURE: 62 MMHG | WEIGHT: 293 LBS | BODY MASS INDEX: 48.82 KG/M2 | SYSTOLIC BLOOD PRESSURE: 120 MMHG | OXYGEN SATURATION: 98 %

## 2018-07-11 DIAGNOSIS — E11.22 TYPE 2 DIABETES MELLITUS WITH STAGE 3 CHRONIC KIDNEY DISEASE, WITH LONG-TERM CURRENT USE OF INSULIN (HCC): Primary | ICD-10-CM

## 2018-07-11 DIAGNOSIS — E78.2 MIXED DIABETIC HYPERLIPIDEMIA ASSOCIATED WITH TYPE 2 DIABETES MELLITUS (HCC): ICD-10-CM

## 2018-07-11 DIAGNOSIS — E11.69 MIXED DIABETIC HYPERLIPIDEMIA ASSOCIATED WITH TYPE 2 DIABETES MELLITUS (HCC): ICD-10-CM

## 2018-07-11 DIAGNOSIS — Z79.4 TYPE 2 DIABETES MELLITUS WITH STAGE 3 CHRONIC KIDNEY DISEASE, WITH LONG-TERM CURRENT USE OF INSULIN (HCC): Primary | ICD-10-CM

## 2018-07-11 DIAGNOSIS — E83.52 HYPERCALCEMIA: ICD-10-CM

## 2018-07-11 DIAGNOSIS — E11.59 HYPERTENSION ASSOCIATED WITH DIABETES (HCC): ICD-10-CM

## 2018-07-11 DIAGNOSIS — E55.9 VITAMIN D DEFICIENCY: ICD-10-CM

## 2018-07-11 DIAGNOSIS — E03.9 ACQUIRED HYPOTHYROIDISM: ICD-10-CM

## 2018-07-11 DIAGNOSIS — I15.2 HYPERTENSION ASSOCIATED WITH DIABETES (HCC): ICD-10-CM

## 2018-07-11 DIAGNOSIS — E53.8 B12 DEFICIENCY: ICD-10-CM

## 2018-07-11 DIAGNOSIS — N18.30 TYPE 2 DIABETES MELLITUS WITH STAGE 3 CHRONIC KIDNEY DISEASE, WITH LONG-TERM CURRENT USE OF INSULIN (HCC): Primary | ICD-10-CM

## 2018-07-11 LAB — GLUCOSE BLDC GLUCOMTR-MCNC: 201 MG/DL (ref 70–130)

## 2018-07-11 PROCEDURE — 82962 GLUCOSE BLOOD TEST: CPT | Performed by: INTERNAL MEDICINE

## 2018-07-11 PROCEDURE — 99214 OFFICE O/P EST MOD 30 MIN: CPT | Performed by: INTERNAL MEDICINE

## 2018-07-11 RX ORDER — CINACALCET 30 MG/1
30 TABLET, FILM COATED ORAL DAILY
Qty: 30 TABLET | Refills: 11 | Status: SHIPPED | OUTPATIENT
Start: 2018-07-11 | End: 2019-08-13 | Stop reason: SDUPTHER

## 2018-07-11 NOTE — PATIENT INSTRUCTIONS
Component      Latest Ref Rng & Units 7/5/2018 7/11/2018   Glucose      70 - 130 mg/dL 243 (H) 201 (A)   BUN      5 - 21 mg/dL 48 (H)    Creatinine      0.50 - 1.40 mg/dL 2.38 (H)    Sodium      135 - 145 mmol/L 140    Potassium      3.5 - 5.3 mmol/L 4.5    Chloride      98 - 110 mmol/L 104    CO2      24.0 - 31.0 mmol/L 25.0    Calcium      8.4 - 10.4 mg/dL 11.2 (H)    Total Protein      6.3 - 8.7 g/dL 7.1    Albumin      3.50 - 5.00 g/dL 3.80    ALT (SGPT)      0 - 54 U/L 15    AST (SGOT)      7 - 45 U/L 20    Alkaline Phosphatase      24 - 120 U/L 69    Total Bilirubin      0.1 - 1.0 mg/dL 0.3    eGFR Non African Amer      >60 mL/min/1.73 21 (L)    Globulin      gm/dL 3.3    A/G Ratio      1.1 - 2.5 g/dL 1.2    BUN/Creatinine Ratio       20.2    Anion Gap      4.0 - 13.0 mmol/L 11.0    WBC      4.80 - 10.80 10*3/mm3 14.00 (H)    RBC      4.20 - 5.40 10*6/mm3 2.89 (L)    Hemoglobin      12.0 - 16.0 g/dL 8.9 (L)    Hematocrit      37.0 - 47.0 % 27.1 (L)    MCV      82.0 - 98.0 fL 93.8    MCH      28.0 - 32.0 pg 30.8    MCHC      33.0 - 36.0 g/dL 32.8 (L)    RDW      12.0 - 15.0 % 17.8 (H)    MPV      6.0 - 12.0 fL 9.3    Platelets      130 - 400 10*3/mm3 263    Neutrophil %      39.0 - 78.0 % 76.4    Lymphocyte %      15.0 - 45.0 % 17.9    Auto Mixed Cells %      0.1 - 24.0 % 5.7    Neutrophils, Absolute      1.50 - 8.30 10*3/mm3 10.70 (H)    Lymphocytes, Absolute      0.80 - 7.00 10*3/mm3 2.50    Auto Mixed Cells #      0.10 - 2.60 10*3/mm3 0.80    Total Cholesterol      130 - 200 mg/dL 149    Triglycerides      0 - 149 mg/dL 419 (H)    HDL Cholesterol      >=50 mg/dL 33 (L)    LDL Cholesterol       0 - 99 mg/dL     VLDL Cholesterol      mg/dL     LDL/HDL Ratio           Creatinine, Urine      Not Estab. mg/dL 92.0    Microalbumin, Urine      Not Estab. ug/mL 346.5    Microalbumin/Creatinine Ratio      0.0 - 30.0 mg/g creat 376.6 (H)    Hemoglobin A1C      % 7.6    TSH Baseline      0.470 - 4.680 mIU/mL 2.380     Vitamin B-12      239 - 931 pg/mL 884    25 Hydroxy, Vitamin D      30.0 - 100.0 ng/ml 37.6

## 2018-07-11 NOTE — PROGRESS NOTES
Maria C Shrestha is a 58 y.o. female who presents for  evaluation of   Chief Complaint   Patient presents with   • Diabetes         Primary Care / Referring Provider  Ole Soliman MD    History of Present Illness  Duration/Timing:  Diabetes mellitus type 2  timing constant    quality controlled, loss control     severity high     Severity (Complications/Hospitalizations)  Secondary Microvascular Complications:  Diabetic Nephropathy, No Diabetic Neuropathy      Context  Diabetes Regimen:  Insulin, Compliant with regimen  Blood Glucose Readings  near goal   Diet  counts carbs, eating only 45 g cho per meal   Exercise:  Does not exercise    Associated Signs/Symptoms  Hyperglycemic Symptoms:  No polyuria, No polydipsia, No polyphagia, Weight loss  Hypoglycemic Episodes:  No documented hypoglycemia    Past Medical History:   Diagnosis Date   • Acquired hypothyroidism 2/6/2017   • Allergic    • Anemia    • Anxiety    • Arthritis    • Chronic kidney disease     3rd stage   • Depression    • Disease of thyroid gland    • Dyslipidemia    • Elevated cholesterol    • Essential hypertension    • Gallbladder abscess    • Obesity    • Type 2 diabetes mellitus (CMS/HCC)    • Type II diabetes mellitus, uncontrolled (CMS/HCC)      Family History   Problem Relation Age of Onset   • Diabetes Other    • Cancer Mother    • Cancer Father    • Heart disease Father    • Diabetes Father    • Obesity Father    • Stroke Father    • Cancer Maternal Grandmother    • Uterine cancer Maternal Grandmother    • Diabetes Maternal Grandfather    • Cancer Paternal Grandmother    • Colon cancer Paternal Grandmother    • Diabetes Paternal Grandfather    • Heart disease Paternal Grandfather    • No Known Problems Sister    • No Known Problems Brother    • No Known Problems Daughter    • No Known Problems Son    • No Known Problems Maternal Aunt    • No Known Problems Paternal Aunt    • BRCA 1/2 Neg Hx    • Breast cancer Neg Hx    • Endometrial cancer Neg  "Hx    • Ovarian cancer Neg Hx      Social History   Substance Use Topics   • Smoking status: Never Smoker   • Smokeless tobacco: Never Used   • Alcohol use No         Current Outpatient Prescriptions:   •  allopurinol (ZYLOPRIM) 100 MG tablet, Take 100 mg by mouth 2 (Two) Times a Day., Disp: , Rfl:   •  aspirin 81 MG tablet, Take 81 mg by mouth Daily., Disp: , Rfl:   •  busPIRone (BUSPAR) 10 MG tablet, buspirone 10 mg tablet  Take 2 tablets twice a day by oral route as needed for 90 days., Disp: , Rfl:   •  calcitriol (ROCALTROL) 0.25 MCG capsule, Take 0.25 mcg by mouth 3 (Three) Times a Week., Disp: , Rfl:   •  Calcium Carb-Cholecalciferol (CALCIUM-VITAMIN D3) 600-400 MG-UNIT tablet, Take 1 tablet by mouth 2 (Two) Times a Day., Disp: , Rfl:   •  carvedilol (COREG) 3.125 MG tablet, Take 3.125 mg by mouth 2 (Two) Times a Day., Disp: , Rfl:   •  cetirizine (zyrTEC) 10 MG tablet, Take 10 mg by mouth As Needed., Disp: , Rfl:   •  Cholecalciferol (VITAMIN D3) 76497 units tablet, Vitamin D2 50,000 unit capsule  Take 1 capsule every week by oral route., Disp: , Rfl:   •  citalopram (CeleXA) 20 MG tablet, Take 20 mg by mouth Daily., Disp: , Rfl:   •  fluticasone (VERAMYST) 27.5 MCG/SPRAY nasal spray, 2 sprays into each nostril Daily., Disp: , Rfl:   •  furosemide (LASIX) 40 MG tablet, Take 1 tablet by mouth Daily., Disp: 90 tablet, Rfl: 3  •  Glucose Blood (BLOOD GLUCOSE TEST) strip, Use 4 x daily, use any brand covered by insurance or same brand as before, Disp: 360 each, Rfl: 3  •  HUMULIN R 500 UNIT/ML CONCENTRATED injection, USING A U100 SYRINGE DRAW UP TO THE DIRECTED UNIT ROXI AND INJECT FOUR TIMES A DAY (UP TO 30 UNIT ROXI FOUR TIMES A DAY), Disp: 100 mL, Rfl: 3  •  insulin glargine (LANTUS) 100 UNIT/ML injection, 200 units daily, Disp: 18 each, Rfl: 3  •  Insulin Syringe 31G X 5/16\" 1 ML misc, 4 x daily, Disp: 120 each, Rfl: 11  •  lamoTRIgine (LaMICtal) 50 MG tablet dispersible disintegrating tablet, Take 50 mg by " mouth Daily., Disp: , Rfl:   •  Lancets (FREESTYLE) lancets, FOUR TIMES A DAY, Disp: 150 each, Rfl: 11  •  levothyroxine (SYNTHROID, LEVOTHROID) 50 MCG tablet, Take 1 tablet by mouth Daily., Disp: 90 tablet, Rfl: 1  •  lisinopril (PRINIVIL,ZESTRIL) 20 MG tablet, Take 20 mg by mouth 2 (Two) Times a Day., Disp: , Rfl:   •  Multiple Vitamins-Minerals (MULTIVITAMIN ADULT PO), Take 1 tablet by mouth Daily., Disp: , Rfl:   •  norethindrone (AYGESTIN) 5 MG tablet, Take 1 tablet by mouth Daily., Disp: 20 tablet, Rfl: 0  •  potassium gluconate 595 (99 K) MG tablet tablet, potassium gluconate 595 mg (99 mg) tablet  Take 2 tablets every day by oral route., Disp: , Rfl:   •  rosuvastatin (CRESTOR) 10 MG tablet, Take 10 mg by mouth Daily., Disp: , Rfl:   •  sodium bicarbonate 650 MG tablet, Take 650 mg by mouth 3 (Three) Times a Day., Disp: , Rfl:   •  TRULICITY 1.5 MG/0.5ML solution pen-injector, INJECT 1.5 MG UNDER THE SKIN EVERY 7 DAYS, Disp: 6 mL, Rfl: 3    Review of Systems    Review of Systems   Constitutional: Positive for unexpected weight change. Negative for activity change, appetite change, chills, diaphoresis, fatigue and fever.   HENT: Negative for congestion, drooling, ear discharge, ear pain, facial swelling, mouth sores, nosebleeds, postnasal drip, sinus pressure, sneezing, sore throat, tinnitus, trouble swallowing and voice change.    Eyes: Negative.  Negative for photophobia, pain, discharge, redness and itching.   Respiratory: Negative.  Negative for apnea, cough, choking, chest tightness, shortness of breath, wheezing and stridor.    Cardiovascular: Negative.  Negative for chest pain, palpitations and leg swelling.   Gastrointestinal: Negative.  Negative for abdominal distention, abdominal pain, constipation, diarrhea, nausea and vomiting.   Endocrine: Positive for polydipsia, polyphagia and polyuria. Negative for cold intolerance and heat intolerance.   Genitourinary: Negative for difficulty urinating,  "dysuria, flank pain and frequency.   Musculoskeletal: Positive for arthralgias, back pain and myalgias. Negative for gait problem, joint swelling, neck pain and neck stiffness.   Skin: Negative for color change, pallor, rash and wound.   Allergic/Immunologic: Negative for environmental allergies, food allergies and immunocompromised state.   Neurological: Negative for dizziness, tremors, seizures, syncope, facial asymmetry, speech difficulty, weakness, light-headedness, numbness and headaches.   Hematological: Negative for adenopathy. Does not bruise/bleed easily.   Psychiatric/Behavioral: Negative for agitation, behavioral problems, confusion, decreased concentration, dysphoric mood, hallucinations, self-injury, sleep disturbance and suicidal ideas. The patient is not nervous/anxious and is not hyperactive.         Objective:     /62 (BP Location: Left arm, Patient Position: Sitting, Cuff Size: Large Adult)   Pulse 68   Ht 165.1 cm (65\")   Wt (!) 181 kg (398 lb 4.8 oz)   LMP  (LMP Unknown)   SpO2 98%   BMI 66.28 kg/m²     Physical Exam   Constitutional: She is oriented to person, place, and time. She appears well-developed.   HENT:   Head: Normocephalic.   Right Ear: External ear normal.   Left Ear: External ear normal.   Nose: Nose normal.   Eyes: Conjunctivae and EOM are normal. No scleral icterus.   Neck: Normal range of motion. Neck supple. No tracheal deviation present. No thyromegaly present.   Cardiovascular: Normal rate, regular rhythm and intact distal pulses.  Exam reveals no gallop and no friction rub.    Murmur heard.  Systolic murmur, carotid bruit    Pulmonary/Chest: Effort normal and breath sounds normal. No stridor. No respiratory distress. She has no wheezes. She has no rales. She exhibits no tenderness.   Abdominal: Soft. Bowel sounds are normal. She exhibits no distension and no mass. There is no tenderness. There is no rebound and no guarding.   Musculoskeletal: Normal range of " motion. She exhibits no tenderness or deformity.   Lymphadenopathy:     She has no cervical adenopathy.   Neurological: She is alert and oriented to person, place, and time. She displays normal reflexes. She exhibits normal muscle tone. Coordination normal.   Skin: No rash noted. No erythema. No pallor.   Psychiatric: She has a normal mood and affect. Her behavior is normal. Judgment and thought content normal.       Lab Review    Results for orders placed or performed in visit on 07/11/18   POC Glucose   Result Value Ref Range    Glucose 201 (A) 70 - 130 mg/dL           Assessment/Plan       ICD-10-CM ICD-9-CM   1. Type 2 diabetes mellitus with stage 3 chronic kidney disease, with long-term current use of insulin (CMS/HCC) E11.22 250.40    N18.3 585.3    Z79.4 V58.67   2. Mixed diabetic hyperlipidemia associated with type 2 diabetes mellitus (CMS/HCC) E11.69 250.80    E78.2 272.2   3. Hypertension associated with diabetes (CMS/HCC) E11.59 250.80    I10 401.9   4. Acquired hypothyroidism E03.9 244.9   5. B12 deficiency E53.8 266.2   6. Vitamin D deficiency E55.9 268.9       Glycemic Management:   Lab Results   Component Value Date    HGBA1C 7.6 07/05/2018    HGBA1C 6.2 09/29/2017    HGBA1C 6.2 06/08/2017     Lab Results   Component Value Date    GLUCOSE 243 (H) 07/05/2018    BUN 48 (H) 07/05/2018    CREATININE 2.38 (H) 07/05/2018    EGFRIFNONA 21 (L) 07/05/2018    BCR 20.2 07/05/2018    CO2 25.0 07/05/2018    CALCIUM 11.2 (H) 07/05/2018    ALBUMIN 3.80 07/05/2018    LABIL2 1.2 07/05/2018    AST 20 07/05/2018    ALT 15 07/05/2018     Lab Results   Component Value Date    WBC 14.00 (H) 07/05/2018    HGB 8.9 (L) 07/05/2018    HCT 27.1 (L) 07/05/2018    MCV 93.8 07/05/2018     07/05/2018     Lab Results   Component Value Date    CREATININE 2.38 (H) 07/05/2018    CREATININE 2.23 (H) 06/27/2018    CREATININE 2.18 (H) 05/30/2018    CREATININE 1.95 (H) 05/02/2018    CREATININE 2.06 (H) 04/04/2018         Trulicity  1.5 mg weekly       Humulin U 500     At this point 50 units for meals    In addition doing lantus 190 units    invokana stopped due to decline in renal function    Lipid Management    Lab Results   Component Value Date    TRIG 419 (H) 07/05/2018    TRIG 261 (H) 09/29/2017    TRIG 367 (H) 06/08/2017     Lab Results   Component Value Date    HDL 33 (L) 07/05/2018    HDL 35 (L) 09/29/2017    HDL 33 (L) 06/08/2017     No components found for: LDLCALC  Lab Results   Component Value Date    LDL  07/05/2018      Comment:      Unable to calculate    LDL 68 09/29/2017    LDL 65 06/08/2017     Lab Results   Component Value Date    LDL  07/05/2018      Comment:      Unable to calculate    LDL 68 09/29/2017    LDL 65 06/08/2017             on Crestor 20 mg daily   Generic causing myalgias    Return to brand name       Blood Pressure Management:    Vitals:    07/11/18 1002   BP: 120/62   Pulse: 68   SpO2: 98%         Per nephrology       Microvascular Complication Monitoring:  No Microalbuminuria, No Diabetic Retinopathy, Date of last eye exam 07/18/2016, No Diabetic Neuropathy  on ACE i     ckd stage III-IV    followed by nephrology     --      Lab Results   Component Value Date    CREATININE 2.38 (H) 07/05/2018    BUN 48 (H) 07/05/2018     07/05/2018    K 4.5 07/05/2018     07/05/2018    CO2 25.0 07/05/2018         Lab Results   Component Value Date    CREATININE 2.38 (H) 07/05/2018    CREATININE 2.23 (H) 06/27/2018    CREATININE 2.18 (H) 05/30/2018         Immunizations:  Last pneumococcal immunization pneumovax before age 65     Flu Shot will have in Mid Nov 2016       Preventive Care:  No smoking      Weight Related:   Wt Readings from Last 3 Encounters:   07/11/18 (!) 181 kg (398 lb 4.8 oz)   07/10/18 (!) 180 kg (396 lb)   06/27/18 (!) 179 kg (395 lb)     Body mass index is 66.28 kg/m².      prefers no bariatric surgery    Now on cpap       Bone Health    Lab Results   Component Value Date    CALCIUM 11.2 (H)  07/05/2018     On rocatrol per nephro for JARETH     Having hypercalcemia    Doing 0.25 three times weekly     Needs to stop     Needs different tx for JARETH    I have added sensipar 30 mg daily respectfully       Thyroid Health  Lab Results   Component Value Date    TSH 2.380 07/05/2018     On levothyroxine 50 ug daily    Other Diabetes Related Aspects       Lab Results   Component Value Date    SULGLDWL36 884 07/05/2018       Anemia , managed by nephrology   Deciding vs epo vs iron   But now   DUB, may need hysterectomy       Systolic Murmur, AS? Echo   Could be due to anemia     Echo and carotid US from 7-17 , normal echo and less than 50% blockage in carotids    I reviewed and summarized records from Ole Soliman MD from 2016 and I reviewed / ordered labs.     Orders Placed This Encounter   Procedures   • POC Glucose         A copy of my note was sent to Ole Soliman MD    Please see my above opinion and suggestions.

## 2018-07-11 NOTE — H&P
[facility]  Maria C Shrestha  : 1960  MRN: [id number]  CSN: [id number]    History and Physical    Subjective   Maria C Shrestha is a 58 y.o. year old  who presents for surgery due to postmenopausal bleeding and endometrial thickening.  Her endometrial stripe measures 13 mm on ultrasound and she has developed anemia with a hemoglobin of 8.9.  Her BMI is 65.    Past Medical History:   Diagnosis Date   • Acquired hypothyroidism 2017   • Allergic    • Anemia    • Anxiety    • Arthritis    • Chronic kidney disease     3rd stage   • Depression    • Disease of thyroid gland    • Dyslipidemia    • Elevated cholesterol    • Essential hypertension    • Gallbladder abscess    • Obesity    • Type 2 diabetes mellitus (CMS/HCC)    • Type II diabetes mellitus, uncontrolled (CMS/HCC)      Past Surgical History:   Procedure Laterality Date   • ADENOIDECTOMY     • CHOLECYSTECTOMY     • COLONOSCOPY  2014   • COLONOSCOPY N/A 3/22/2017    Procedure: COLONOSCOPY WITH ANESTHESIA;  Surgeon: Mono Linder MD;  Location: [facility] ENDOSCOPY;  Service:    • DILATATION AND CURETTAGE      X 2   • ENDOSCOPY     • TONSILLECTOMY       Smoking status: Never Smoker                                                              Smokeless tobacco: Never Used                          Current Outpatient Prescriptions:   •  allopurinol (ZYLOPRIM) 100 MG tablet, Take 100 mg by mouth 2 (Two) Times a Day., Disp: , Rfl:   •  aspirin 81 MG tablet, Take 81 mg by mouth Daily., Disp: , Rfl:   •  busPIRone (BUSPAR) 10 MG tablet, buspirone 10 mg tablet  Take 2 tablets twice a day by oral route as needed for 90 days., Disp: , Rfl:   •  calcitriol (ROCALTROL) 0.25 MCG capsule, Take 0.25 mcg by mouth 3 (Three) Times a Week., Disp: , Rfl:   •  Calcium Carb-Cholecalciferol (CALCIUM-VITAMIN D3) 600-400 MG-UNIT tablet, Take 1 tablet by mouth 2 (Two) Times a Day., Disp: , Rfl:   •  carvedilol (COREG) 3.125 MG tablet, Take 3.125 mg by mouth 2 (Two) Times  "a Day., Disp: , Rfl:   •  cetirizine (zyrTEC) 10 MG tablet, Take 10 mg by mouth As Needed., Disp: , Rfl:   •  Cholecalciferol (VITAMIN D3) 26100 units tablet, Vitamin D2 50,000 unit capsule  Take 1 capsule every week by oral route., Disp: , Rfl:   •  citalopram (CeleXA) 20 MG tablet, Take 20 mg by mouth Daily., Disp: , Rfl:   •  fluticasone (VERAMYST) 27.5 MCG/SPRAY nasal spray, 2 sprays into each nostril Daily., Disp: , Rfl:   •  furosemide (LASIX) 40 MG tablet, Take 1 tablet by mouth Daily., Disp: 90 tablet, Rfl: 3  •  Glucose Blood (BLOOD GLUCOSE TEST) strip, Use 4 x daily, use any brand covered by insurance or same brand as before, Disp: 360 each, Rfl: 3  •  HUMULIN R 500 UNIT/ML CONCENTRATED injection, USING A U100 SYRINGE DRAW UP TO THE DIRECTED UNIT ROXI AND INJECT FOUR TIMES A DAY (UP TO 30 UNIT ROXI FOUR TIMES A DAY), Disp: 100 mL, Rfl: 3  •  insulin glargine (LANTUS) 100 UNIT/ML injection, 200 units daily, Disp: 18 each, Rfl: 3  •  Insulin Syringe 31G X 5/16\" 1 ML misc, 4 x daily, Disp: 120 each, Rfl: 11  •  lamoTRIgine (LaMICtal) 50 MG tablet dispersible disintegrating tablet, Take 50 mg by mouth Daily., Disp: , Rfl:   •  Lancets (FREESTYLE) lancets, FOUR TIMES A DAY, Disp: 150 each, Rfl: 11  •  levothyroxine (SYNTHROID, LEVOTHROID) 50 MCG tablet, Take 1 tablet by mouth Daily., Disp: 90 tablet, Rfl: 1  •  lisinopril (PRINIVIL,ZESTRIL) 20 MG tablet, Take 20 mg by mouth 2 (Two) Times a Day., Disp: , Rfl:   •  Multiple Vitamins-Minerals (MULTIVITAMIN ADULT PO), Take 1 tablet by mouth Daily., Disp: , Rfl:   •  potassium gluconate 595 (99 K) MG tablet tablet, potassium gluconate 595 mg (99 mg) tablet  Take 2 tablets every day by oral route., Disp: , Rfl:   •  rosuvastatin (CRESTOR) 10 MG tablet, Take 10 mg by mouth Daily., Disp: , Rfl:   •  sodium bicarbonate 650 MG tablet, Take 650 mg by mouth 3 (Three) Times a Day., Disp: , Rfl:   •  TRULICITY 1.5 MG/0.5ML solution pen-injector, INJECT 1.5 MG UNDER THE SKIN " "EVERY 7 DAYS, Disp: 6 mL, Rfl: 3  •  cinacalcet (SENSIPAR) 30 MG tablet, Take 1 tablet by mouth Daily., Disp: 30 tablet, Rfl: 11  •  norethindrone (AYGESTIN) 5 MG tablet, Take 1 tablet by mouth Daily., Disp: 20 tablet, Rfl: 0    Allergies   Allergen Reactions   • Codeine Nausea Only       Family History   Problem Relation Age of Onset   • Diabetes Other    • Cancer Mother    • Cancer Father    • Heart disease Father    • Diabetes Father    • Obesity Father    • Stroke Father    • Cancer Maternal Grandmother    • Uterine cancer Maternal Grandmother    • Diabetes Maternal Grandfather    • Cancer Paternal Grandmother    • Colon cancer Paternal Grandmother    • Diabetes Paternal Grandfather    • Heart disease Paternal Grandfather    • No Known Problems Sister    • No Known Problems Brother    • No Known Problems Daughter    • No Known Problems Son    • No Known Problems Maternal Aunt    • No Known Problems Paternal Aunt    • BRCA 1/2 Neg Hx    • Breast cancer Neg Hx    • Endometrial cancer Neg Hx    • Ovarian cancer Neg Hx      Review of Systems   Constitutional: Negative for unexpected weight change.   HENT: Negative for trouble swallowing.    Respiratory: Negative for shortness of breath.    Cardiovascular: Negative for chest pain.   Musculoskeletal: Negative for neck stiffness.   Neurological: Negative for seizures.   Hematological: Does not bruise/bleed easily.         Objective   /84 (BP Location: Other (Comment), Patient Position: Sitting, Cuff Size: Large Adult)   Ht 165.1 cm (65\")   Wt (!) 180 kg (396 lb)   LMP  (LMP Unknown)   Breastfeeding? No   BMI 65.90 kg/m²     Physical Exam   Physical Exam   Constitutional: She is oriented to person, place, and time. She appears well-developed and well-nourished.   HENT:   Head: Normocephalic and atraumatic.   Eyes: No scleral icterus.   Neck: No tracheal deviation present.   Cardiovascular: Normal rate and regular rhythm.    Pulmonary/Chest: Effort normal and " breath sounds normal.   Abdominal: Soft. Bowel sounds are normal. She exhibits no distension. There is no tenderness.   Genitourinary: Uterus normal. Rectal exam shows no external hemorrhoid. Pelvic exam was performed with patient supine. There is no lesion on the right labia. There is no lesion on the left labia. Uterus is not enlarged, not fixed and not tender. Cervix exhibits no motion tenderness. Right adnexum displays no mass. Left adnexum displays no mass. No bleeding in the vagina. No vaginal discharge found.   Lymphadenopathy:        Right: No inguinal adenopathy present.        Left: No inguinal adenopathy present.   Neurological: She is alert and oriented to person, place, and time.   Skin: Skin is warm and dry.   Vitals reviewed.      Labs  Lab Results   Component Value Date     07/05/2018    HGB 8.9 (L) 07/05/2018    HCT 27.1 (L) 07/05/2018    WBC 14.00 (H) 07/05/2018     07/05/2018    K 4.5 07/05/2018     07/05/2018    CO2 25.0 07/05/2018    BUN 48 (H) 07/05/2018    CREATININE 2.38 (H) 07/05/2018    GLUCOSE 243 (H) 07/05/2018    ALBUMIN 3.80 07/05/2018    CALCIUM 11.2 (H) 07/05/2018    AST 20 07/05/2018    ALT 15 07/05/2018    BILITOT 0.3 07/05/2018        Assessment & Plan    Maria C was seen today for post menopausal bleeding.    Diagnoses and all orders for this visit:    Postmenopausal bleeding  -     Case Request  -     norethindrone (AYGESTIN) 5 MG tablet; Take 1 tablet by mouth Daily.    BMI 60.0-69.9, adult (CMS/HCC)    Risks, benefits, and alternatives of a D&C with hysteroscopy were discussed with the patient in detail. Intraoperative risks of bleeding and damage to surrounding organs, including but not limited to intestine, bladder and ureter, were explained. Management of these were also explained. Postoperative complications such as infection, pneumonia, DVT, and bleeding were explained. The importance of compliance with postoperative restrictions was discussed. The  diagnostic nature of the procedure was explained.    Uterine perforation was discussed with the patient at length.     All of the patient's questions were answered to her satisfaction. She was encouraged to return for an additional appointment if she had further questions. She verbalized understanding of the above and wished to proceed with the outlined plan.        Michael Castaneda MD  7/11/2018  2:15 PM

## 2018-07-18 ENCOUNTER — APPOINTMENT (OUTPATIENT)
Dept: PREADMISSION TESTING | Facility: HOSPITAL | Age: 58
End: 2018-07-18

## 2018-07-18 VITALS
SYSTOLIC BLOOD PRESSURE: 155 MMHG | OXYGEN SATURATION: 96 % | WEIGHT: 293 LBS | RESPIRATION RATE: 22 BRPM | HEART RATE: 77 BPM | BODY MASS INDEX: 47.09 KG/M2 | HEIGHT: 66 IN | DIASTOLIC BLOOD PRESSURE: 62 MMHG

## 2018-07-18 LAB
ALBUMIN SERPL-MCNC: 3.9 G/DL (ref 3.5–5)
ALBUMIN/GLOB SERPL: 1.3 G/DL (ref 1.1–2.5)
ALP SERPL-CCNC: 47 U/L (ref 24–120)
ALT SERPL W P-5'-P-CCNC: 22 U/L (ref 0–54)
ANION GAP SERPL CALCULATED.3IONS-SCNC: 15 MMOL/L (ref 4–13)
AST SERPL-CCNC: 20 U/L (ref 7–45)
BASOPHILS # BLD AUTO: 0.02 10*3/MM3 (ref 0–0.2)
BASOPHILS NFR BLD AUTO: 0.2 % (ref 0–2)
BILIRUB SERPL-MCNC: 0.2 MG/DL (ref 0.1–1)
BUN BLD-MCNC: 28 MG/DL (ref 5–21)
BUN/CREAT SERPL: 11.7 (ref 7–25)
CALCIUM SPEC-SCNC: 8.9 MG/DL (ref 8.4–10.4)
CHLORIDE SERPL-SCNC: 102 MMOL/L (ref 98–110)
CO2 SERPL-SCNC: 24 MMOL/L (ref 24–31)
CREAT BLD-MCNC: 2.39 MG/DL (ref 0.5–1.4)
DEPRECATED RDW RBC AUTO: 61.5 FL (ref 40–54)
EOSINOPHIL # BLD AUTO: 0.26 10*3/MM3 (ref 0–0.7)
EOSINOPHIL NFR BLD AUTO: 2.7 % (ref 0–4)
ERYTHROCYTE [DISTWIDTH] IN BLOOD BY AUTOMATED COUNT: 17.9 % (ref 12–15)
GFR SERPL CREATININE-BSD FRML MDRD: 21 ML/MIN/1.73
GLOBULIN UR ELPH-MCNC: 2.9 GM/DL
GLUCOSE BLD-MCNC: 223 MG/DL (ref 70–100)
HCT VFR BLD AUTO: 23.3 % (ref 37–47)
HGB BLD-MCNC: 7.4 G/DL (ref 12–16)
IMM GRANULOCYTES # BLD: 0.07 10*3/MM3 (ref 0–0.03)
IMM GRANULOCYTES NFR BLD: 0.7 % (ref 0–5)
LYMPHOCYTES # BLD AUTO: 1.77 10*3/MM3 (ref 0.72–4.86)
LYMPHOCYTES NFR BLD AUTO: 18.1 % (ref 15–45)
MCH RBC QN AUTO: 30.1 PG (ref 28–32)
MCHC RBC AUTO-ENTMCNC: 31.8 G/DL (ref 33–36)
MCV RBC AUTO: 94.7 FL (ref 82–98)
MONOCYTES # BLD AUTO: 0.58 10*3/MM3 (ref 0.19–1.3)
MONOCYTES NFR BLD AUTO: 5.9 % (ref 4–12)
NEUTROPHILS # BLD AUTO: 7.08 10*3/MM3 (ref 1.87–8.4)
NEUTROPHILS NFR BLD AUTO: 72.4 % (ref 39–78)
NRBC BLD MANUAL-RTO: 0 /100 WBC (ref 0–0)
PLATELET # BLD AUTO: 209 10*3/MM3 (ref 130–400)
PMV BLD AUTO: 10.3 FL (ref 6–12)
POTASSIUM BLD-SCNC: 4.2 MMOL/L (ref 3.5–5.3)
PROT SERPL-MCNC: 6.8 G/DL (ref 6.3–8.7)
RBC # BLD AUTO: 2.46 10*6/MM3 (ref 4.2–5.4)
SODIUM BLD-SCNC: 141 MMOL/L (ref 135–145)
WBC NRBC COR # BLD: 9.78 10*3/MM3 (ref 4.8–10.8)

## 2018-07-18 PROCEDURE — 93005 ELECTROCARDIOGRAM TRACING: CPT

## 2018-07-18 PROCEDURE — 80053 COMPREHEN METABOLIC PANEL: CPT | Performed by: OBSTETRICS & GYNECOLOGY

## 2018-07-18 PROCEDURE — 93010 ELECTROCARDIOGRAM REPORT: CPT | Performed by: INTERNAL MEDICINE

## 2018-07-18 PROCEDURE — 36415 COLL VENOUS BLD VENIPUNCTURE: CPT

## 2018-07-18 PROCEDURE — 85025 COMPLETE CBC W/AUTO DIFF WBC: CPT | Performed by: OBSTETRICS & GYNECOLOGY

## 2018-07-18 RX ORDER — DIAZEPAM 2 MG/1
2 TABLET ORAL AS NEEDED
COMMUNITY
End: 2022-05-24 | Stop reason: SDUPTHER

## 2018-07-18 NOTE — DISCHARGE INSTRUCTIONS
DAY OF SURGERY INSTRUCTIONS        YOUR SURGEON: Michael Castaneda     PROCEDURE: Dilatation and Curettage     DATE OF SURGERY: July 25, 2018     ARRIVAL TIME: AS DIRECTED BY OFFICE    DAY OF SURGERY TAKE ONLY THESE MEDICATIONS UNLESS OTHERWISE INSTRUCTED BY YOUR PHYSICIAN: Carvedilol          MANAGING PAIN AFTER SURGERY    We know you are probably wondering what your pain will be like after surgery.  Following surgery it is unrealistic to expect you will not have pain.   Pain is how our bodies let us know that something is wrong or cautions us to be careful.  That said, our goal is to make your pain tolerable.    Methods we may use to treat your pain include (oral or IV medications, PCAs, epidurals, nerve blocks, etc.)   While some procedures require IV pain medications for a short time after surgery, transitioning to pain medications by mouth allows for better management of pain.   Your nurse will encourage you to take oral pain medications whenever possible.  IV medications work almost immediately, but only last a short while.  Taking medications by mouth allows for a more constant level of medication in your blood stream for a longer period of time.      Once your pain is out of control it is harder to get back under control.  It is important you are aware when your next dose of pain medication is due.  If you are admitted, your nurse may write the time of your next dose on the white board in your room to help you remember.      We are interested in your pain and encourage you to inform us about aggravating factors during your visit.   Many times a simple repositioning every few hours can make a big difference.    If your physician says it is okay, do not let your pain prevent you from getting out of bed. Be sure to call your nurse for assistance prior to getting up so you do not fall.      Before surgery, please decide your tolerable pain goal.  These faces help describe the pain ratings we use on a 0-10 scale.    Be prepared to tell us your goal and whether or not you take pain or anxiety medications at home.            BEFORE YOU COME TO THE HOSPITAL  (Pre-op instructions)  • Do not eat, drink, smoke or chew gum after midnight the night before surgery.  This also includes no mints.  • Morning of surgery take only the medicines you have been instructed with a sip of water unless otherwise instructed  by your physician.  • Do not shave, wear makeup or dark nail polish.  • Remove all jewelry including rings.  • Leave anything you consider valuable at home.  • Leave your suitcase in the car until after your surgery.  • Bring the following with you if applicable:  o Picture ID and insurance, Medicare or Medicaid cards  o Co-pay/deductible required by insurance (cash, check, credit card)  o Copy of advance directive, living will or power-of- documents if not brought to PAT  o CPAP or BIPAP mask and tubing  o Relaxation aids (MP3 player, book, magazine)  • On the day of surgery check in at registration located at the main entrance of the hospital.       Outpatient Surgery Guidelines, Adult  Outpatient procedures are those for which the person having the procedure is allowed to go home the same day as the procedure. Various procedures are done on an outpatient basis. You should follow some general guidelines if you will be having an outpatient procedure.  LET YOUR HEALTH CARE PROVIDER KNOW ABOUT:  · Any allergies you have.  · All medicines you are taking, including vitamins, herbs, eye drops, creams, and over-the-counter medicines.  · Previous problems you or members of your family have had with the use of anesthetics.  · Any blood disorders you have.  · Previous surgeries you have had.  · Medical conditions you have.  RISKS AND COMPLICATIONS  Your health care provider will discuss possible risks and complications with you before surgery. Common risks and complications include:    · Problems due to the use of  anesthetics.  · Blood loss and replacement (does not apply to minor surgical procedures).  · Temporary increase in pain due to surgery.  · Uncorrected pain or problems that the surgery was meant to correct.  · Infection.  · New damage.  BEFORE THE PROCEDURE  · Ask your health care provider about changing or stopping your regular medicines. You may need to stop taking certain medicines in the days or weeks before the procedure.  · Stop smoking at least 2 weeks before surgery. This lowers your risk for complications during and after surgery. Ask your health care provider for help with this if needed.  · Eat your usual meals and a light supper the day before surgery. Continue fluid intake. Do not drink alcohol.  · Do not eat or drink after midnight the night before your surgery.   · Arrange for someone to take you home and to stay with you for 24 hours after the procedure. Medicine given for your procedure may affect your ability to drive or to care for yourself.  · Call your health care provider's office if you develop an illness or problem that may prevent you from safely having your procedure.  AFTER THE PROCEDURE  After surgery, you will be taken to a recovery area, where your progress will be monitored. If there are no complications, you will be allowed to go home when you are awake, stable, and taking fluids well. You may have numbness around the surgical site. Healing will take some time. You will have tenderness at the surgical site and may have some swelling and bruising. You may also have some nausea.  HOME CARE INSTRUCTIONS  · Do not drive for 24 hours, or as directed by your health care provider. Do not drive while taking prescription pain medicines.  · Do not drink alcohol for 24 hours.  · Do not make important decisions or sign legal documents for 24 hours.  · You may resume a normal diet and activities as directed.  · Do not lift anything heavier than 10 pounds (4.5 kg) or play contact sports until your  health care provider says it is okay.  · Change your bandages (dressings) as directed.  · Only take over-the-counter or prescription medicines as directed by your health care provider.  · Follow up with your health care provider as directed.  SEEK MEDICAL CARE IF:  · You have increased bleeding (more than a small spot) from the surgical site.  · You have redness, swelling, or increasing pain in the wound.  · You see pus coming from the wound.  · You have a fever.  · You notice a bad smell coming from the wound or dressing.  · You feel lightheaded or faint.  · You develop a rash.  · You have trouble breathing.  · You develop allergies.  MAKE SURE YOU:  · Understand these instructions.  · Will watch your condition.  · Will get help right away if you are not doing well or get worse.     This information is not intended to replace advice given to you by your health care provider. Make sure you discuss any questions you have with your health care provider.     Document Released: 09/12/2002 Document Revised: 05/03/2016 Document Reviewed: 05/22/2014  [employer] Interactive Patient Education ©2016 [employer]       Fall Prevention in Hospitals, Adult  As a hospital patient, your condition and the treatments you receive can increase your risk for falls. Some additional risk factors for falls in a hospital include:  · Being in an unfamiliar environment.  · Being on bed rest.  · Your surgery.  · Taking certain medicines.  · Your tubing requirements, such as intravenous (IV) therapy or catheters.  It is important that you learn how to decrease fall risks while at the hospital. Below are important tips that can help prevent falls.  SAFETY TIPS FOR PREVENTING FALLS  Talk about your risk of falling.  · Ask your health care provider why you are at risk for falling. Is it your medicine, illness, tubing placement, or something else?  · Make a plan with your health care provider to keep you safe from falls.  · Ask your health care  provider or pharmacist about side effects of your medicines. Some medicines can make you dizzy or affect your coordination.  Ask for help.  · Ask for help before getting out of bed. You may need to press your call button.  · Ask for assistance in getting safely to the toilet.  · Ask for a walker or cane to be put at your bedside. Ask that most of the side rails on your bed be placed up before your health care provider leaves the room.  · Ask family or friends to sit with you.  · Ask for things that are out of your reach, such as your glasses, hearing aids, telephone, bedside table, or call button.  Follow these tips to avoid falling:  · Stay lying or seated, rather than standing, while waiting for help.  · Wear rubber-soled slippers or shoes whenever you walk in the hospital.  · Avoid quick, sudden movements.  ¨ Change positions slowly.  ¨ Sit on the side of your bed before standing.  ¨ Stand up slowly and wait before you start to walk.  · Let your health care provider know if there is a spill on the floor.  · Pay careful attention to the medical equipment, electrical cords, and tubes around you.  · When you need help, use your call button by your bed or in the bathroom. Wait for one of your health care providers to help you.  · If you feel dizzy or unsure of your footing, return to bed and wait for assistance.  · Avoid being distracted by the TV, telephone, or another person in your room.  · Do not lean or support yourself on rolling objects, such as IV poles or bedside tables.     This information is not intended to replace advice given to you by your health care provider. Make sure you discuss any questions you have with your health care provider.     Document Released: 12/15/2001 Document Revised: 01/08/2016 Document Reviewed: 08/25/2013  [employer] Interactive Patient Education ©2016 [employer]       Surgical Site Infections FAQs  What is a Surgical Site Infection (SSI)?  A surgical site infection is an  infection that occurs after surgery in the part of the body where the surgery took place. Most patients who have surgery do not develop an infection. However, infections develop in about 1 to 3 out of every 100 patients who have surgery.  Some of the common symptoms of a surgical site infection are:  · Redness and pain around the area where you had surgery  · Drainage of cloudy fluid from your surgical wound  · Fever  Can SSIs be treated?  Yes. Most surgical site infections can be treated with antibiotics. The antibiotic given to you depends on the bacteria (germs) causing the infection. Sometimes patients with SSIs also need another surgery to treat the infection.  What are some of the things that hospitals are doing to prevent SSIs?  To prevent SSIs, doctors, nurses, and other healthcare providers:  · Clean their hands and arms up to their elbows with an antiseptic agent just before the surgery.  · Clean their hands with soap and water or an alcohol-based hand rub before and after caring for each patient.  · May remove some of your hair immediately before your surgery using electric clippers if the hair is in the same area where the procedure will occur. They should not shave you with a razor.  · Wear special hair covers, masks, gowns, and gloves during surgery to keep the surgery area clean.  · Give you antibiotics before your surgery starts. In most cases, you should get antibiotics within 60 minutes before the surgery starts and the antibiotics should be stopped within 24 hours after surgery.  · Clean the skin at the site of your surgery with a special soap that kills germs.  What can I do to help prevent SSIs?  Before your surgery:  · Tell your doctor about other medical problems you may have. Health problems such as allergies, diabetes, and obesity could affect your surgery and your treatment.  · Quit smoking. Patients who smoke get more infections. Talk to your doctor about how you can quit before your  surgery.  · Do not shave near where you will have surgery. Shaving with a razor can irritate your skin and make it easier to develop an infection.  At the time of your surgery:  · Speak up if someone tries to shave you with a razor before surgery. Ask why you need to be shaved and talk with your surgeon if you have any concerns.  · Ask if you will get antibiotics before surgery.  After your surgery:  · Make sure that your healthcare providers clean their hands before examining you, either with soap and water or an alcohol-based hand rub.  · If you do not see your providers clean their hands, please ask them to do so.  · Family and friends who visit you should not touch the surgical wound or dressings.  · Family and friends should clean their hands with soap and water or an alcohol-based hand rub before and after visiting you. If you do not see them clean their hands, ask them to clean their hands.  What do I need to do when I go home from the hospital?  · Before you go home, your doctor or nurse should explain everything you need to know about taking care of your wound. Make sure you understand how to care for your wound before you leave the hospital.  · Always clean your hands before and after caring for your wound.  · Before you go home, make sure you know who to contact if you have questions or problems after you get home.  · If you have any symptoms of an infection, such as redness and pain at the surgery site, drainage, or fever, call your doctor immediately.  If you have additional questions, please ask your doctor or nurse.  Developed and co-sponsored by The Society for Healthcare Epidemiology of [place] (SHEA); Infectious Diseases Society of [place] (IDSA); American Hospital Association; Association for Professionals in Infection Control and Epidemiology (APIC); Centers for Disease Control and Prevention (CDC); and The Joint Commission.     This information is not intended to replace advice given to you by  your health care provider. Make sure you discuss any questions you have with your health care provider.     Document Released: 12/23/2014 Document Revised: 01/08/2016 Document Reviewed: 03/02/2016  [employer] Interactive Patient Education ©2016 [employer]     PATIENT/FAMILY/RESPONSIBLE PARTY VERBALIZES UNDERSTANDING OF ABOVE EDUCATION.  COPY OF PAIN SCALE GIVEN AND REVIEWED WITH VERBALIZED UNDERSTANDING.

## 2018-07-19 ENCOUNTER — TELEPHONE (OUTPATIENT)
Dept: OBSTETRICS AND GYNECOLOGY | Facility: CLINIC | Age: 58
End: 2018-07-19

## 2018-07-19 NOTE — TELEPHONE ENCOUNTER
Called and  answered will have pt call us back. Prework called and pt HGB is 7.4. Dr Castaneda wants to know how her bleeding is doing. If it is any worse hey may want her to increase her norethindrone.

## 2018-07-19 NOTE — TELEPHONE ENCOUNTER
Pt called back she said she started medication 7/12/18 and bleeding stopped 7/14/18. No bleeding as of now. Some fatigue but no more than usual. She is still taking medication daily

## 2018-07-24 ENCOUNTER — LAB (OUTPATIENT)
Dept: LAB | Facility: HOSPITAL | Age: 58
End: 2018-07-24
Attending: INTERNAL MEDICINE

## 2018-07-24 ENCOUNTER — TELEPHONE (OUTPATIENT)
Dept: ONCOLOGY | Facility: CLINIC | Age: 58
End: 2018-07-24

## 2018-07-24 ENCOUNTER — INFUSION (OUTPATIENT)
Dept: ONCOLOGY | Facility: HOSPITAL | Age: 58
End: 2018-07-24
Attending: INTERNAL MEDICINE

## 2018-07-24 ENCOUNTER — OFFICE VISIT (OUTPATIENT)
Dept: ONCOLOGY | Facility: CLINIC | Age: 58
End: 2018-07-24

## 2018-07-24 VITALS
WEIGHT: 293 LBS | HEART RATE: 68 BPM | SYSTOLIC BLOOD PRESSURE: 144 MMHG | RESPIRATION RATE: 16 BRPM | HEIGHT: 66 IN | BODY MASS INDEX: 47.09 KG/M2 | TEMPERATURE: 98.1 F | OXYGEN SATURATION: 98 % | DIASTOLIC BLOOD PRESSURE: 78 MMHG

## 2018-07-24 DIAGNOSIS — D63.1 ANEMIA IN STAGE 3 CHRONIC KIDNEY DISEASE (HCC): ICD-10-CM

## 2018-07-24 DIAGNOSIS — E11.22 TYPE 2 DIABETES MELLITUS WITH ESRD (END-STAGE RENAL DISEASE) (HCC): Primary | ICD-10-CM

## 2018-07-24 DIAGNOSIS — N18.6 TYPE 2 DIABETES MELLITUS WITH ESRD (END-STAGE RENAL DISEASE) (HCC): Primary | ICD-10-CM

## 2018-07-24 DIAGNOSIS — N18.30 ANEMIA IN STAGE 3 CHRONIC KIDNEY DISEASE (HCC): ICD-10-CM

## 2018-07-24 LAB
ALBUMIN SERPL-MCNC: 3.9 G/DL (ref 3.5–5)
ALBUMIN/GLOB SERPL: 1.3 G/DL (ref 1.1–2.5)
ALP SERPL-CCNC: 48 U/L (ref 24–120)
ALT SERPL W P-5'-P-CCNC: 30 U/L (ref 0–54)
ANION GAP SERPL CALCULATED.3IONS-SCNC: 13 MMOL/L (ref 4–13)
AST SERPL-CCNC: 20 U/L (ref 7–45)
BASOPHILS # BLD AUTO: 0.02 10*3/MM3 (ref 0–0.2)
BASOPHILS NFR BLD AUTO: 0.2 % (ref 0–2)
BILIRUB SERPL-MCNC: 0.2 MG/DL (ref 0.1–1)
BUN BLD-MCNC: 34 MG/DL (ref 5–21)
BUN/CREAT SERPL: 14 (ref 7–25)
CALCIUM SPEC-SCNC: 7.7 MG/DL (ref 8.4–10.4)
CHLORIDE SERPL-SCNC: 108 MMOL/L (ref 98–110)
CO2 SERPL-SCNC: 20 MMOL/L (ref 24–31)
CREAT BLD-MCNC: 2.42 MG/DL (ref 0.5–1.4)
DEPRECATED RDW RBC AUTO: 61.8 FL (ref 40–54)
EOSINOPHIL # BLD AUTO: 0.21 10*3/MM3 (ref 0–0.7)
EOSINOPHIL NFR BLD AUTO: 2.1 % (ref 0–4)
ERYTHROCYTE [DISTWIDTH] IN BLOOD BY AUTOMATED COUNT: 17.8 % (ref 12–15)
FERRITIN SERPL-MCNC: 20.5 NG/ML (ref 11.1–264)
GFR SERPL CREATININE-BSD FRML MDRD: 21 ML/MIN/1.73
GLOBULIN UR ELPH-MCNC: 2.9 GM/DL
GLUCOSE BLD-MCNC: 212 MG/DL (ref 70–100)
HCT VFR BLD AUTO: 24.7 % (ref 37–47)
HGB BLD-MCNC: 7.6 G/DL (ref 12–16)
HOLD SPECIMEN: NORMAL
HOLD SPECIMEN: NORMAL
IMM GRANULOCYTES # BLD: 0.06 10*3/MM3 (ref 0–0.03)
IMM GRANULOCYTES NFR BLD: 0.6 % (ref 0–5)
IRON 24H UR-MRATE: 42 MCG/DL (ref 42–180)
IRON SATN MFR SERPL: 12 % (ref 20–45)
LYMPHOCYTES # BLD AUTO: 1.32 10*3/MM3 (ref 0.72–4.86)
LYMPHOCYTES NFR BLD AUTO: 13.1 % (ref 15–45)
MCH RBC QN AUTO: 29.3 PG (ref 28–32)
MCHC RBC AUTO-ENTMCNC: 30.8 G/DL (ref 33–36)
MCV RBC AUTO: 95.4 FL (ref 82–98)
MONOCYTES # BLD AUTO: 0.49 10*3/MM3 (ref 0.19–1.3)
MONOCYTES NFR BLD AUTO: 4.9 % (ref 4–12)
NEUTROPHILS # BLD AUTO: 8 10*3/MM3 (ref 1.87–8.4)
NEUTROPHILS NFR BLD AUTO: 79.1 % (ref 39–78)
NRBC BLD MANUAL-RTO: 0 /100 WBC (ref 0–0)
PLATELET # BLD AUTO: 218 10*3/MM3 (ref 130–400)
PMV BLD AUTO: 10.3 FL (ref 6–12)
POTASSIUM BLD-SCNC: 4.3 MMOL/L (ref 3.5–5.3)
PROT SERPL-MCNC: 6.8 G/DL (ref 6.3–8.7)
RBC # BLD AUTO: 2.59 10*6/MM3 (ref 4.2–5.4)
SODIUM BLD-SCNC: 141 MMOL/L (ref 135–145)
TIBC SERPL-MCNC: 352 MCG/DL (ref 225–420)
WBC NRBC COR # BLD: 10.1 10*3/MM3 (ref 4.8–10.8)

## 2018-07-24 PROCEDURE — 80053 COMPREHEN METABOLIC PANEL: CPT

## 2018-07-24 PROCEDURE — 83550 IRON BINDING TEST: CPT | Performed by: INTERNAL MEDICINE

## 2018-07-24 PROCEDURE — 82728 ASSAY OF FERRITIN: CPT | Performed by: INTERNAL MEDICINE

## 2018-07-24 PROCEDURE — 36415 COLL VENOUS BLD VENIPUNCTURE: CPT

## 2018-07-24 PROCEDURE — 99214 OFFICE O/P EST MOD 30 MIN: CPT | Performed by: INTERNAL MEDICINE

## 2018-07-24 PROCEDURE — 83540 ASSAY OF IRON: CPT | Performed by: INTERNAL MEDICINE

## 2018-07-24 PROCEDURE — 85025 COMPLETE CBC W/AUTO DIFF WBC: CPT

## 2018-07-24 RX ORDER — AMLODIPINE BESYLATE 5 MG/1
TABLET ORAL
COMMUNITY
Start: 2018-07-07 | End: 2020-05-20 | Stop reason: DRUGHIGH

## 2018-07-24 RX ORDER — SODIUM CHLORIDE 9 MG/ML
250 INJECTION, SOLUTION INTRAVENOUS ONCE
Status: CANCELLED | OUTPATIENT
Start: 2018-07-26

## 2018-07-24 RX ORDER — SODIUM CHLORIDE 9 MG/ML
250 INJECTION, SOLUTION INTRAVENOUS ONCE
Status: CANCELLED | OUTPATIENT
Start: 2018-08-02

## 2018-07-24 NOTE — PROGRESS NOTES
[facility]  HEMATOLOGY & ONCOLOGY        Subjective     VISIT DIAGNOSIS:   Encounter Diagnosis   Name Primary?   • Anemia in stage 3 chronic kidney disease        REASON FOR VISIT:     No chief complaint on file.       HEMATOLOGY / ONCOLOGY HISTORY:Ms. Shrestha is a 55 year old female with past medical history of hypertension, chronic kidney disease, diabetes insulindependent  type 2   No history exists.     [No treatment plan]  Cancer Staging Information:  No matching staging information was found for the patient.      INTERVAL HISTORY  Patient ID: Maria C Shrestha is a 58 y.o. year old female   Anemia secondary to chronic kidney disease, Stage III. Patient has not required VALERIE therapy this far.  2. Iron deficiency in the setting of chronic kidney disease, has benefitted from Feraheme. Had Feraheme in January, hemoglobin has  now near normalized at 10.7        Review of Systems   Constitutional: Negative.    HENT: Negative.    Eyes: Negative.    Respiratory: Negative.    Cardiovascular: Negative.    Gastrointestinal: Negative.    Endocrine: Negative.    Genitourinary: Negative.    Musculoskeletal: Negative.    Skin: Negative.    Allergic/Immunologic: Negative.    Neurological: Negative.    Hematological: Negative.    Psychiatric/Behavioral: Negative.             Medications:    Current Outpatient Prescriptions   Medication Sig Dispense Refill   • allopurinol (ZYLOPRIM) 100 MG tablet Take 100 mg by mouth 2 (Two) Times a Day.     • amLODIPine (NORVASC) 5 MG tablet      • aspirin 81 MG tablet Take 81 mg by mouth Daily. Stop 7/22/2018     • busPIRone (BUSPAR) 10 MG tablet buspirone 10 mg tablet   Take 2 tablets twice a day by oral route as needed for 90 days.     • carvedilol (COREG) 3.125 MG tablet Take 3.125 mg by mouth 2 (Two) Times a Day.     • cetirizine (zyrTEC) 10 MG tablet Take 10 mg by mouth As Needed.     • Cholecalciferol (VITAMIN D3) 55459 units tablet Vitamin D2 50,000 unit capsule   Take 1  "capsule every week by oral route. Sunday     • cinacalcet (SENSIPAR) 30 MG tablet Take 1 tablet by mouth Daily. 30 tablet 11   • citalopram (CeleXA) 20 MG tablet Take 20 mg by mouth Daily.     • diazePAM (VALIUM) 2 MG tablet Take 2 mg by mouth As Needed for Anxiety.     • fluticasone (VERAMYST) 27.5 MCG/SPRAY nasal spray 2 sprays into each nostril Daily.     • furosemide (LASIX) 40 MG tablet Take 1 tablet by mouth Daily. 90 tablet 3   • Glucose Blood (BLOOD GLUCOSE TEST) strip Use 4 x daily, use any brand covered by insurance or same brand as before 360 each 3   • HUMULIN R 500 UNIT/ML CONCENTRATED injection USING A U100 SYRINGE DRAW UP TO THE DIRECTED UNIT ROXI AND INJECT FOUR TIMES A DAY (UP TO 30 UNIT ROXI FOUR TIMES A DAY) (Patient taking differently: USING A U100 SYRINGE DRAW UP TO THE DIRECTED UNIT ROXI AND INJECT FOUR TIMES A DAY (UP TO 30 UNIT ROXI FOUR TIMES A DAY)  6 units with meals) 100 mL 3   • insulin glargine (LANTUS) 100 UNIT/ML injection 200 units daily (Patient taking differently: Inject 180 Units under the skin Every Night. 200 units daily) 18 each 3   • Insulin Syringe 31G X 5/16\" 1 ML misc 4 x daily 120 each 11   • lamoTRIgine (LaMICtal) 50 MG tablet dispersible disintegrating tablet Take 50 mg by mouth Every Night.     • Lancets (FREESTYLE) lancets FOUR TIMES A  each 11   • levothyroxine (SYNTHROID, LEVOTHROID) 50 MCG tablet Take 1 tablet by mouth Daily. 90 tablet 1   • lisinopril (PRINIVIL,ZESTRIL) 20 MG tablet Take 20 mg by mouth 2 (Two) Times a Day.     • Multiple Vitamins-Minerals (MULTIVITAMIN ADULT PO) Take 1 tablet by mouth Daily.     • norethindrone (AYGESTIN) 5 MG tablet Take 1 tablet by mouth Daily. 20 tablet 0   • potassium gluconate 595 (99 K) MG tablet tablet potassium gluconate 595 mg (99 mg) tablet   Take 2 tablets every day by oral route.     • rosuvastatin (CRESTOR) 10 MG tablet Take 10 mg by mouth Daily.     • sodium bicarbonate 650 MG tablet Take 650 mg by mouth 3 " (Three) Times a Day.     • TRULICITY 1.5 MG/0.5ML solution pen-injector INJECT 1.5 MG UNDER THE SKIN EVERY 7 DAYS (Patient taking differently: INJECT 1.5 MG UNDER THE SKIN EVERY 7 DAYS Sunday) 6 mL 3     No current facility-administered medications for this visit.        ALLERGIES:    Allergies   Allergen Reactions   • Codeine Nausea Only       Objective      @VITALS    Current Status 7/24/2018   ECOG score 1       General Appearance: Patient is awake, alert, oriented and in no acute distress. Patient is welldeveloped, wellnourished, and appears stated age.  HEENT: Normocephalic. Sclerae clear, conjunctiva pink, extraocular movements intact, pupils, round, reactive to light and  accommodation. Mouth and throat are clear with moist oral mucosa.  NECK: Supple, no jugular venous distention, thyroid not enlarged.  LYMPH: No cervical, supraclavicular, axillary, or inguinal lymphadenopathy.  CHEST: Equal bilateral expansion, AP  diameter normal, resonant percussion note  LUNGS: Good air movement, no rales, rhonchi, rubs or wheezes with auscultation  CARDIO: Regular sinus rhythm, no murmurs, gallops or rubs.  ABDOMEN: Nondistended, soft, No tenderness, no guarding, no rebound, No hepatosplenomegaly. No abdominal masses. Bowel sounds positive. No hernia  GENITALIA: Not examined.  BREASTS: Not examined.  MUSKEL: No joint swelling, decreased motion, or inflammation  EXTREMS: No edema, clubbing, cyanosis, No varicose veins.  NEURO: Grossly nonfocal, Gait is coordinated and smooth, Cognition is preserved.  SKIN: No rashes, no ecchymoses, no petechia.  PSYCH: Oriented to time, place and person. Memory is preserved. Mood and affect appear normal      RECENT LABS:  Lab on 07/24/2018   Component Date Value Ref Range Status   • Glucose 07/24/2018 212* 70 - 100 mg/dL Final   • BUN 07/24/2018 34* 5 - 21 mg/dL Final   • Creatinine 07/24/2018 2.42* 0.50 - 1.40 mg/dL Final   • Sodium 07/24/2018 141  135 - 145 mmol/L Final   • Potassium  07/24/2018 4.3  3.5 - 5.3 mmol/L Final   • Chloride 07/24/2018 108  98 - 110 mmol/L Final   • CO2 07/24/2018 20.0* 24.0 - 31.0 mmol/L Final   • Calcium 07/24/2018 7.7* 8.4 - 10.4 mg/dL Final   • Total Protein 07/24/2018 6.8  6.3 - 8.7 g/dL Final   • Albumin 07/24/2018 3.90  3.50 - 5.00 g/dL Final   • ALT (SGPT) 07/24/2018 30  0 - 54 U/L Final   • AST (SGOT) 07/24/2018 20  7 - 45 U/L Final   • Alkaline Phosphatase 07/24/2018 48  24 - 120 U/L Final   • Total Bilirubin 07/24/2018 0.2  0.1 - 1.0 mg/dL Final   • eGFR Non African Amer 07/24/2018 21* >60 mL/min/1.73 Final   • Globulin 07/24/2018 2.9  gm/dL Final   • A/G Ratio 07/24/2018 1.3  1.1 - 2.5 g/dL Final   • BUN/Creatinine Ratio 07/24/2018 14.0  7.0 - 25.0 Final   • Anion Gap 07/24/2018 13.0  4.0 - 13.0 mmol/L Final   • WBC 07/24/2018 10.10  4.80 - 10.80 10*3/mm3 Final   • RBC 07/24/2018 2.59* 4.20 - 5.40 10*6/mm3 Final   • Hemoglobin 07/24/2018 7.6* 12.0 - 16.0 g/dL Final   • Hematocrit 07/24/2018 24.7* 37.0 - 47.0 % Final   • MCV 07/24/2018 95.4  82.0 - 98.0 fL Final   • MCH 07/24/2018 29.3  28.0 - 32.0 pg Final   • MCHC 07/24/2018 30.8* 33.0 - 36.0 g/dL Final   • RDW 07/24/2018 17.8* 12.0 - 15.0 % Final   • RDW-SD 07/24/2018 61.8* 40.0 - 54.0 fl Final   • MPV 07/24/2018 10.3  6.0 - 12.0 fL Final   • Platelets 07/24/2018 218  130 - 400 10*3/mm3 Final   • Neutrophil % 07/24/2018 79.1* 39.0 - 78.0 % Final   • Lymphocyte % 07/24/2018 13.1* 15.0 - 45.0 % Final   • Monocyte % 07/24/2018 4.9  4.0 - 12.0 % Final   • Eosinophil % 07/24/2018 2.1  0.0 - 4.0 % Final   • Basophil % 07/24/2018 0.2  0.0 - 2.0 % Final   • Immature Grans % 07/24/2018 0.6  0.0 - 5.0 % Final   • Neutrophils, Absolute 07/24/2018 8.00  1.87 - 8.40 10*3/mm3 Final   • Lymphocytes, Absolute 07/24/2018 1.32  0.72 - 4.86 10*3/mm3 Final   • Monocytes, Absolute 07/24/2018 0.49  0.19 - 1.30 10*3/mm3 Final   • Eosinophils, Absolute 07/24/2018 0.21  0.00 - 0.70 10*3/mm3 Final   • Basophils, Absolute  07/24/2018 0.02  0.00 - 0.20 10*3/mm3 Final   • Immature Grans, Absolute 07/24/2018 0.06* 0.00 - 0.03 10*3/mm3 Final   • nRBC 07/24/2018 0.0  0.0 - 0.0 /100 WBC Final   Appointment on 07/18/2018   Component Date Value Ref Range Status   • Glucose 07/18/2018 223* 70 - 100 mg/dL Final   • BUN 07/18/2018 28* 5 - 21 mg/dL Final   • Creatinine 07/18/2018 2.39* 0.50 - 1.40 mg/dL Final   • Sodium 07/18/2018 141  135 - 145 mmol/L Final   • Potassium 07/18/2018 4.2  3.5 - 5.3 mmol/L Final   • Chloride 07/18/2018 102  98 - 110 mmol/L Final   • CO2 07/18/2018 24.0  24.0 - 31.0 mmol/L Final   • Calcium 07/18/2018 8.9  8.4 - 10.4 mg/dL Final   • Total Protein 07/18/2018 6.8  6.3 - 8.7 g/dL Final   • Albumin 07/18/2018 3.90  3.50 - 5.00 g/dL Final   • ALT (SGPT) 07/18/2018 22  0 - 54 U/L Final   • AST (SGOT) 07/18/2018 20  7 - 45 U/L Final   • Alkaline Phosphatase 07/18/2018 47  24 - 120 U/L Final   • Total Bilirubin 07/18/2018 0.2  0.1 - 1.0 mg/dL Final   • eGFR Non African Amer 07/18/2018 21* >60 mL/min/1.73 Final   • Globulin 07/18/2018 2.9  gm/dL Final   • A/G Ratio 07/18/2018 1.3  1.1 - 2.5 g/dL Final   • BUN/Creatinine Ratio 07/18/2018 11.7  7.0 - 25.0 Final   • Anion Gap 07/18/2018 15.0* 4.0 - 13.0 mmol/L Final   • WBC 07/18/2018 9.78  4.80 - 10.80 10*3/mm3 Final   • RBC 07/18/2018 2.46* 4.20 - 5.40 10*6/mm3 Final   • Hemoglobin 07/18/2018 7.4* 12.0 - 16.0 g/dL Final   • Hematocrit 07/18/2018 23.3* 37.0 - 47.0 % Final   • MCV 07/18/2018 94.7  82.0 - 98.0 fL Final   • MCH 07/18/2018 30.1  28.0 - 32.0 pg Final   • MCHC 07/18/2018 31.8* 33.0 - 36.0 g/dL Final   • RDW 07/18/2018 17.9* 12.0 - 15.0 % Final   • RDW-SD 07/18/2018 61.5* 40.0 - 54.0 fl Final   • MPV 07/18/2018 10.3  6.0 - 12.0 fL Final   • Platelets 07/18/2018 209  130 - 400 10*3/mm3 Final   • Neutrophil % 07/18/2018 72.4  39.0 - 78.0 % Final   • Lymphocyte % 07/18/2018 18.1  15.0 - 45.0 % Final   • Monocyte % 07/18/2018 5.9  4.0 - 12.0 % Final   • Eosinophil %  07/18/2018 2.7  0.0 - 4.0 % Final   • Basophil % 07/18/2018 0.2  0.0 - 2.0 % Final   • Immature Grans % 07/18/2018 0.7  0.0 - 5.0 % Final   • Neutrophils, Absolute 07/18/2018 7.08  1.87 - 8.40 10*3/mm3 Final   • Lymphocytes, Absolute 07/18/2018 1.77  0.72 - 4.86 10*3/mm3 Final   • Monocytes, Absolute 07/18/2018 0.58  0.19 - 1.30 10*3/mm3 Final   • Eosinophils, Absolute 07/18/2018 0.26  0.00 - 0.70 10*3/mm3 Final   • Basophils, Absolute 07/18/2018 0.02  0.00 - 0.20 10*3/mm3 Final   • Immature Grans, Absolute 07/18/2018 0.07* 0.00 - 0.03 10*3/mm3 Final   • nRBC 07/18/2018 0.0  0.0 - 0.0 /100 WBC Final       RADIOLOGY:  No results found.         Assessment/Plan        Anemia secondary to chronic kidney disease, Stage III. Patient has not required VALERIE therapy this far.  2. Iron deficiency in the setting of chronic kidney disease, has benefitted from Feraheme. Had Feraheme in January, hemoglobin has  now near normalized at 10.3.  Plan continue watchful waiting return to clinic in 3 months time and check another CBC and iron studies.  If the iron is still low I may give additional intravenous iron.  Her CMS guidelines I cannot give her Procrit unless hemoglobin is less than 10.    Clinically patient asymptomatic.  I will redraw her iron studies today.  Hemoglobin is a 10.  Return to clinic in 3 months time.            Marco A Melo MD    7/24/2018    9:24 AM             [facility]  HEMATOLOGY & ONCOLOGY        Subjective     VISIT DIAGNOSIS:   Encounter Diagnosis   Name Primary?   • Anemia in stage 3 chronic kidney disease        REASON FOR VISIT:     No chief complaint on file.       HEMATOLOGY / ONCOLOGY HISTORY:Ms. Shrestha is a 55 year old female with past medical history of hypertension, chronic kidney disease, diabetes insulindependent  type 2   No history exists.     [No treatment plan]  Cancer Staging Information:  No matching staging information was found for the patient.      INTERVAL  HISTORY  Patient ID: Maria C Shrestha is a 58 y.o. year old female   Anemia secondary to chronic kidney disease, Stage III. Patient has not required VALERIE therapy this far.  2. Iron deficiency in the setting of chronic kidney disease, has benefitted from Feraheme. Had Feraheme in January, hemoglobin has  now near normalized at 10.7        Review of Systems   Constitutional: Negative.    HENT: Negative.    Eyes: Negative.    Respiratory: Negative.    Cardiovascular: Negative.    Gastrointestinal: Negative.    Endocrine: Negative.    Genitourinary: Negative.    Musculoskeletal: Negative.    Skin: Negative.    Allergic/Immunologic: Negative.    Neurological: Negative.    Hematological: Negative.    Psychiatric/Behavioral: Negative.             Medications:    Current Outpatient Prescriptions   Medication Sig Dispense Refill   • allopurinol (ZYLOPRIM) 100 MG tablet Take 100 mg by mouth 2 (Two) Times a Day.     • amLODIPine (NORVASC) 5 MG tablet      • aspirin 81 MG tablet Take 81 mg by mouth Daily. Stop 7/22/2018     • busPIRone (BUSPAR) 10 MG tablet buspirone 10 mg tablet   Take 2 tablets twice a day by oral route as needed for 90 days.     • carvedilol (COREG) 3.125 MG tablet Take 3.125 mg by mouth 2 (Two) Times a Day.     • cetirizine (zyrTEC) 10 MG tablet Take 10 mg by mouth As Needed.     • Cholecalciferol (VITAMIN D3) 51064 units tablet Vitamin D2 50,000 unit capsule   Take 1 capsule every week by oral route. Sunday     • cinacalcet (SENSIPAR) 30 MG tablet Take 1 tablet by mouth Daily. 30 tablet 11   • citalopram (CeleXA) 20 MG tablet Take 20 mg by mouth Daily.     • diazePAM (VALIUM) 2 MG tablet Take 2 mg by mouth As Needed for Anxiety.     • fluticasone (VERAMYST) 27.5 MCG/SPRAY nasal spray 2 sprays into each nostril Daily.     • furosemide (LASIX) 40 MG tablet Take 1 tablet by mouth Daily. 90 tablet 3   • Glucose Blood (BLOOD GLUCOSE TEST) strip Use 4 x daily, use any brand covered by insurance or same brand as  "before 360 each 3   • HUMULIN R 500 UNIT/ML CONCENTRATED injection USING A U100 SYRINGE DRAW UP TO THE DIRECTED UNIT ROXI AND INJECT FOUR TIMES A DAY (UP TO 30 UNIT ROXI FOUR TIMES A DAY) (Patient taking differently: USING A U100 SYRINGE DRAW UP TO THE DIRECTED UNIT ROXI AND INJECT FOUR TIMES A DAY (UP TO 30 UNIT ROXI FOUR TIMES A DAY)  6 units with meals) 100 mL 3   • insulin glargine (LANTUS) 100 UNIT/ML injection 200 units daily (Patient taking differently: Inject 180 Units under the skin Every Night. 200 units daily) 18 each 3   • Insulin Syringe 31G X 5/16\" 1 ML misc 4 x daily 120 each 11   • lamoTRIgine (LaMICtal) 50 MG tablet dispersible disintegrating tablet Take 50 mg by mouth Every Night.     • Lancets (FREESTYLE) lancets FOUR TIMES A  each 11   • levothyroxine (SYNTHROID, LEVOTHROID) 50 MCG tablet Take 1 tablet by mouth Daily. 90 tablet 1   • lisinopril (PRINIVIL,ZESTRIL) 20 MG tablet Take 20 mg by mouth 2 (Two) Times a Day.     • Multiple Vitamins-Minerals (MULTIVITAMIN ADULT PO) Take 1 tablet by mouth Daily.     • norethindrone (AYGESTIN) 5 MG tablet Take 1 tablet by mouth Daily. 20 tablet 0   • potassium gluconate 595 (99 K) MG tablet tablet potassium gluconate 595 mg (99 mg) tablet   Take 2 tablets every day by oral route.     • rosuvastatin (CRESTOR) 10 MG tablet Take 10 mg by mouth Daily.     • sodium bicarbonate 650 MG tablet Take 650 mg by mouth 3 (Three) Times a Day.     • TRULICITY 1.5 MG/0.5ML solution pen-injector INJECT 1.5 MG UNDER THE SKIN EVERY 7 DAYS (Patient taking differently: INJECT 1.5 MG UNDER THE SKIN EVERY 7 DAYS Sunday) 6 mL 3     No current facility-administered medications for this visit.        ALLERGIES:    Allergies   Allergen Reactions   • Codeine Nausea Only       Objective      @VITALS    Current Status 7/24/2018   ECOG score 1       General Appearance: Patient is awake, alert, oriented and in no acute distress. Patient is welldeveloped, wellnourished, and appears " stated age.  HEENT: Normocephalic. Sclerae clear, conjunctiva pink, extraocular movements intact, pupils, round, reactive to light and  accommodation. Mouth and throat are clear with moist oral mucosa.  NECK: Supple, no jugular venous distention, thyroid not enlarged.  LYMPH: No cervical, supraclavicular, axillary, or inguinal lymphadenopathy.  CHEST: Equal bilateral expansion, AP  diameter normal, resonant percussion note  LUNGS: Good air movement, no rales, rhonchi, rubs or wheezes with auscultation  CARDIO: Regular sinus rhythm, no murmurs, gallops or rubs.  ABDOMEN: Nondistended, soft, No tenderness, no guarding, no rebound, No hepatosplenomegaly. No abdominal masses. Bowel sounds positive. No hernia  GENITALIA: Not examined.  BREASTS: Not examined.  MUSKEL: No joint swelling, decreased motion, or inflammation  EXTREMS: No edema, clubbing, cyanosis, No varicose veins.  NEURO: Grossly nonfocal, Gait is coordinated and smooth, Cognition is preserved.  SKIN: No rashes, no ecchymoses, no petechia.  PSYCH: Oriented to time, place and person. Memory is preserved. Mood and affect appear normal      RECENT LABS:  Lab on 07/24/2018   Component Date Value Ref Range Status   • Glucose 07/24/2018 212* 70 - 100 mg/dL Final   • BUN 07/24/2018 34* 5 - 21 mg/dL Final   • Creatinine 07/24/2018 2.42* 0.50 - 1.40 mg/dL Final   • Sodium 07/24/2018 141  135 - 145 mmol/L Final   • Potassium 07/24/2018 4.3  3.5 - 5.3 mmol/L Final   • Chloride 07/24/2018 108  98 - 110 mmol/L Final   • CO2 07/24/2018 20.0* 24.0 - 31.0 mmol/L Final   • Calcium 07/24/2018 7.7* 8.4 - 10.4 mg/dL Final   • Total Protein 07/24/2018 6.8  6.3 - 8.7 g/dL Final   • Albumin 07/24/2018 3.90  3.50 - 5.00 g/dL Final   • ALT (SGPT) 07/24/2018 30  0 - 54 U/L Final   • AST (SGOT) 07/24/2018 20  7 - 45 U/L Final   • Alkaline Phosphatase 07/24/2018 48  24 - 120 U/L Final   • Total Bilirubin 07/24/2018 0.2  0.1 - 1.0 mg/dL Final   • eGFR Non African Amer 07/24/2018 21* >60  mL/min/1.73 Final   • Globulin 07/24/2018 2.9  gm/dL Final   • A/G Ratio 07/24/2018 1.3  1.1 - 2.5 g/dL Final   • BUN/Creatinine Ratio 07/24/2018 14.0  7.0 - 25.0 Final   • Anion Gap 07/24/2018 13.0  4.0 - 13.0 mmol/L Final   • WBC 07/24/2018 10.10  4.80 - 10.80 10*3/mm3 Final   • RBC 07/24/2018 2.59* 4.20 - 5.40 10*6/mm3 Final   • Hemoglobin 07/24/2018 7.6* 12.0 - 16.0 g/dL Final   • Hematocrit 07/24/2018 24.7* 37.0 - 47.0 % Final   • MCV 07/24/2018 95.4  82.0 - 98.0 fL Final   • MCH 07/24/2018 29.3  28.0 - 32.0 pg Final   • MCHC 07/24/2018 30.8* 33.0 - 36.0 g/dL Final   • RDW 07/24/2018 17.8* 12.0 - 15.0 % Final   • RDW-SD 07/24/2018 61.8* 40.0 - 54.0 fl Final   • MPV 07/24/2018 10.3  6.0 - 12.0 fL Final   • Platelets 07/24/2018 218  130 - 400 10*3/mm3 Final   • Neutrophil % 07/24/2018 79.1* 39.0 - 78.0 % Final   • Lymphocyte % 07/24/2018 13.1* 15.0 - 45.0 % Final   • Monocyte % 07/24/2018 4.9  4.0 - 12.0 % Final   • Eosinophil % 07/24/2018 2.1  0.0 - 4.0 % Final   • Basophil % 07/24/2018 0.2  0.0 - 2.0 % Final   • Immature Grans % 07/24/2018 0.6  0.0 - 5.0 % Final   • Neutrophils, Absolute 07/24/2018 8.00  1.87 - 8.40 10*3/mm3 Final   • Lymphocytes, Absolute 07/24/2018 1.32  0.72 - 4.86 10*3/mm3 Final   • Monocytes, Absolute 07/24/2018 0.49  0.19 - 1.30 10*3/mm3 Final   • Eosinophils, Absolute 07/24/2018 0.21  0.00 - 0.70 10*3/mm3 Final   • Basophils, Absolute 07/24/2018 0.02  0.00 - 0.20 10*3/mm3 Final   • Immature Grans, Absolute 07/24/2018 0.06* 0.00 - 0.03 10*3/mm3 Final   • nRBC 07/24/2018 0.0  0.0 - 0.0 /100 WBC Final   Appointment on 07/18/2018   Component Date Value Ref Range Status   • Glucose 07/18/2018 223* 70 - 100 mg/dL Final   • BUN 07/18/2018 28* 5 - 21 mg/dL Final   • Creatinine 07/18/2018 2.39* 0.50 - 1.40 mg/dL Final   • Sodium 07/18/2018 141  135 - 145 mmol/L Final   • Potassium 07/18/2018 4.2  3.5 - 5.3 mmol/L Final   • Chloride 07/18/2018 102  98 - 110 mmol/L Final   • CO2 07/18/2018 24.0   24.0 - 31.0 mmol/L Final   • Calcium 07/18/2018 8.9  8.4 - 10.4 mg/dL Final   • Total Protein 07/18/2018 6.8  6.3 - 8.7 g/dL Final   • Albumin 07/18/2018 3.90  3.50 - 5.00 g/dL Final   • ALT (SGPT) 07/18/2018 22  0 - 54 U/L Final   • AST (SGOT) 07/18/2018 20  7 - 45 U/L Final   • Alkaline Phosphatase 07/18/2018 47  24 - 120 U/L Final   • Total Bilirubin 07/18/2018 0.2  0.1 - 1.0 mg/dL Final   • eGFR Non African Amer 07/18/2018 21* >60 mL/min/1.73 Final   • Globulin 07/18/2018 2.9  gm/dL Final   • A/G Ratio 07/18/2018 1.3  1.1 - 2.5 g/dL Final   • BUN/Creatinine Ratio 07/18/2018 11.7  7.0 - 25.0 Final   • Anion Gap 07/18/2018 15.0* 4.0 - 13.0 mmol/L Final   • WBC 07/18/2018 9.78  4.80 - 10.80 10*3/mm3 Final   • RBC 07/18/2018 2.46* 4.20 - 5.40 10*6/mm3 Final   • Hemoglobin 07/18/2018 7.4* 12.0 - 16.0 g/dL Final   • Hematocrit 07/18/2018 23.3* 37.0 - 47.0 % Final   • MCV 07/18/2018 94.7  82.0 - 98.0 fL Final   • MCH 07/18/2018 30.1  28.0 - 32.0 pg Final   • MCHC 07/18/2018 31.8* 33.0 - 36.0 g/dL Final   • RDW 07/18/2018 17.9* 12.0 - 15.0 % Final   • RDW-SD 07/18/2018 61.5* 40.0 - 54.0 fl Final   • MPV 07/18/2018 10.3  6.0 - 12.0 fL Final   • Platelets 07/18/2018 209  130 - 400 10*3/mm3 Final   • Neutrophil % 07/18/2018 72.4  39.0 - 78.0 % Final   • Lymphocyte % 07/18/2018 18.1  15.0 - 45.0 % Final   • Monocyte % 07/18/2018 5.9  4.0 - 12.0 % Final   • Eosinophil % 07/18/2018 2.7  0.0 - 4.0 % Final   • Basophil % 07/18/2018 0.2  0.0 - 2.0 % Final   • Immature Grans % 07/18/2018 0.7  0.0 - 5.0 % Final   • Neutrophils, Absolute 07/18/2018 7.08  1.87 - 8.40 10*3/mm3 Final   • Lymphocytes, Absolute 07/18/2018 1.77  0.72 - 4.86 10*3/mm3 Final   • Monocytes, Absolute 07/18/2018 0.58  0.19 - 1.30 10*3/mm3 Final   • Eosinophils, Absolute 07/18/2018 0.26  0.00 - 0.70 10*3/mm3 Final   • Basophils, Absolute 07/18/2018 0.02  0.00 - 0.20 10*3/mm3 Final   • Immature Grans, Absolute 07/18/2018 0.07* 0.00 - 0.03 10*3/mm3 Final   • nRBC  07/18/2018 0.0  0.0 - 0.0 /100 WBC Final       RADIOLOGY:  No results found.         Assessment/Plan        Anemia secondary to chronic kidney disease, Stage III. Patient has not required VALERIE therapy this far.  2. Iron deficiency in the setting of chronic kidney disease, has benefitted from Feraheme. Had Feraheme in January, hemoglobin has  now near normalized at 10.2.  Plan continue watchful waiting return to clinic in 3 months time and check another CBC and iron studies.  If the iron is still low I may give additional intravenous iron.  .    Clinically patient asymptomatic.  I will redraw her iron studies today.  Hemoglobin is a 10.3gm Today. Per CMS guidelines Rx Procrit 40,000 u s/q every month.       Marco A Melo MD    7/24/2018    9:24 AM             [facility]  HEMATOLOGY & ONCOLOGY        Subjective     VISIT DIAGNOSIS:   Encounter Diagnosis   Name Primary?   • Anemia in stage 3 chronic kidney disease        REASON FOR VISIT:     No chief complaint on file.       HEMATOLOGY / ONCOLOGY HISTORY:Ms. Shrestha is a 55 year old female with past medical history of hypertension, chronic kidney disease, diabetes insulindependent  type 2   No history exists.     [No treatment plan]  Cancer Staging Information:  No matching staging information was found for the patient.      INTERVAL HISTORY  Patient ID: Maria C Shrestha is a 58 y.o. year old female   Anemia secondary to chronic kidney disease, Stage III. Patient has not required VALERIE therapy this far.  2. Iron deficiency in the setting of chronic kidney disease, has benefitted from Feraheme. Had Feraheme in January, hemoglobin has  now near normalized at 10.7        Review of Systems   Constitutional: Negative.    HENT: Negative.    Eyes: Negative.    Respiratory: Negative.    Cardiovascular: Negative.    Gastrointestinal: Negative.    Endocrine: Negative.    Genitourinary: Negative.    Musculoskeletal: Negative.    Skin: Negative.    Allergic/Immunologic:  "Negative.    Neurological: Negative.    Hematological: Negative.    Psychiatric/Behavioral: Negative.             Medications:    Current Outpatient Prescriptions   Medication Sig Dispense Refill   • allopurinol (ZYLOPRIM) 100 MG tablet Take 100 mg by mouth 2 (Two) Times a Day.     • amLODIPine (NORVASC) 5 MG tablet      • aspirin 81 MG tablet Take 81 mg by mouth Daily. Stop 7/22/2018     • busPIRone (BUSPAR) 10 MG tablet buspirone 10 mg tablet   Take 2 tablets twice a day by oral route as needed for 90 days.     • carvedilol (COREG) 3.125 MG tablet Take 3.125 mg by mouth 2 (Two) Times a Day.     • cetirizine (zyrTEC) 10 MG tablet Take 10 mg by mouth As Needed.     • Cholecalciferol (VITAMIN D3) 69864 units tablet Vitamin D2 50,000 unit capsule   Take 1 capsule every week by oral route. Sunday     • cinacalcet (SENSIPAR) 30 MG tablet Take 1 tablet by mouth Daily. 30 tablet 11   • citalopram (CeleXA) 20 MG tablet Take 20 mg by mouth Daily.     • diazePAM (VALIUM) 2 MG tablet Take 2 mg by mouth As Needed for Anxiety.     • fluticasone (VERAMYST) 27.5 MCG/SPRAY nasal spray 2 sprays into each nostril Daily.     • furosemide (LASIX) 40 MG tablet Take 1 tablet by mouth Daily. 90 tablet 3   • Glucose Blood (BLOOD GLUCOSE TEST) strip Use 4 x daily, use any brand covered by insurance or same brand as before 360 each 3   • HUMULIN R 500 UNIT/ML CONCENTRATED injection USING A U100 SYRINGE DRAW UP TO THE DIRECTED UNIT ROXI AND INJECT FOUR TIMES A DAY (UP TO 30 UNIT ROXI FOUR TIMES A DAY) (Patient taking differently: USING A U100 SYRINGE DRAW UP TO THE DIRECTED UNIT ROXI AND INJECT FOUR TIMES A DAY (UP TO 30 UNIT ROXI FOUR TIMES A DAY)  6 units with meals) 100 mL 3   • insulin glargine (LANTUS) 100 UNIT/ML injection 200 units daily (Patient taking differently: Inject 180 Units under the skin Every Night. 200 units daily) 18 each 3   • Insulin Syringe 31G X 5/16\" 1 ML misc 4 x daily 120 each 11   • lamoTRIgine (LaMICtal) 50 MG " tablet dispersible disintegrating tablet Take 50 mg by mouth Every Night.     • Lancets (FREESTYLE) lancets FOUR TIMES A  each 11   • levothyroxine (SYNTHROID, LEVOTHROID) 50 MCG tablet Take 1 tablet by mouth Daily. 90 tablet 1   • lisinopril (PRINIVIL,ZESTRIL) 20 MG tablet Take 20 mg by mouth 2 (Two) Times a Day.     • Multiple Vitamins-Minerals (MULTIVITAMIN ADULT PO) Take 1 tablet by mouth Daily.     • norethindrone (AYGESTIN) 5 MG tablet Take 1 tablet by mouth Daily. 20 tablet 0   • potassium gluconate 595 (99 K) MG tablet tablet potassium gluconate 595 mg (99 mg) tablet   Take 2 tablets every day by oral route.     • rosuvastatin (CRESTOR) 10 MG tablet Take 10 mg by mouth Daily.     • sodium bicarbonate 650 MG tablet Take 650 mg by mouth 3 (Three) Times a Day.     • TRULICITY 1.5 MG/0.5ML solution pen-injector INJECT 1.5 MG UNDER THE SKIN EVERY 7 DAYS (Patient taking differently: INJECT 1.5 MG UNDER THE SKIN EVERY 7 DAYS Sunday) 6 mL 3     No current facility-administered medications for this visit.        ALLERGIES:    Allergies   Allergen Reactions   • Codeine Nausea Only       Objective      @VITALS    Current Status 7/24/2018   ECOG score 1       General Appearance: Patient is awake, alert, oriented and in no acute distress. Patient is welldeveloped, wellnourished, and appears stated age.  HEENT: Normocephalic. Sclerae clear, conjunctiva pink, extraocular movements intact, pupils, round, reactive to light and  accommodation. Mouth and throat are clear with moist oral mucosa.  NECK: Supple, no jugular venous distention, thyroid not enlarged.  LYMPH: No cervical, supraclavicular, axillary, or inguinal lymphadenopathy.  CHEST: Equal bilateral expansion, AP  diameter normal, resonant percussion note  LUNGS: Good air movement, no rales, rhonchi, rubs or wheezes with auscultation  CARDIO: Regular sinus rhythm, no murmurs, gallops or rubs.  ABDOMEN: Nondistended, soft, No tenderness, no guarding, no  rebound, No hepatosplenomegaly. No abdominal masses. Bowel sounds positive. No hernia  GENITALIA: Not examined.  BREASTS: Not examined.  MUSKEL: No joint swelling, decreased motion, or inflammation  EXTREMS: No edema, clubbing, cyanosis, No varicose veins.  NEURO: Grossly nonfocal, Gait is coordinated and smooth, Cognition is preserved.  SKIN: No rashes, no ecchymoses, no petechia.  PSYCH: Oriented to time, place and person. Memory is preserved. Mood and affect appear normal      RECENT LABS:  Lab on 07/24/2018   Component Date Value Ref Range Status   • Glucose 07/24/2018 212* 70 - 100 mg/dL Final   • BUN 07/24/2018 34* 5 - 21 mg/dL Final   • Creatinine 07/24/2018 2.42* 0.50 - 1.40 mg/dL Final   • Sodium 07/24/2018 141  135 - 145 mmol/L Final   • Potassium 07/24/2018 4.3  3.5 - 5.3 mmol/L Final   • Chloride 07/24/2018 108  98 - 110 mmol/L Final   • CO2 07/24/2018 20.0* 24.0 - 31.0 mmol/L Final   • Calcium 07/24/2018 7.7* 8.4 - 10.4 mg/dL Final   • Total Protein 07/24/2018 6.8  6.3 - 8.7 g/dL Final   • Albumin 07/24/2018 3.90  3.50 - 5.00 g/dL Final   • ALT (SGPT) 07/24/2018 30  0 - 54 U/L Final   • AST (SGOT) 07/24/2018 20  7 - 45 U/L Final   • Alkaline Phosphatase 07/24/2018 48  24 - 120 U/L Final   • Total Bilirubin 07/24/2018 0.2  0.1 - 1.0 mg/dL Final   • eGFR Non African Amer 07/24/2018 21* >60 mL/min/1.73 Final   • Globulin 07/24/2018 2.9  gm/dL Final   • A/G Ratio 07/24/2018 1.3  1.1 - 2.5 g/dL Final   • BUN/Creatinine Ratio 07/24/2018 14.0  7.0 - 25.0 Final   • Anion Gap 07/24/2018 13.0  4.0 - 13.0 mmol/L Final   • WBC 07/24/2018 10.10  4.80 - 10.80 10*3/mm3 Final   • RBC 07/24/2018 2.59* 4.20 - 5.40 10*6/mm3 Final   • Hemoglobin 07/24/2018 7.6* 12.0 - 16.0 g/dL Final   • Hematocrit 07/24/2018 24.7* 37.0 - 47.0 % Final   • MCV 07/24/2018 95.4  82.0 - 98.0 fL Final   • MCH 07/24/2018 29.3  28.0 - 32.0 pg Final   • MCHC 07/24/2018 30.8* 33.0 - 36.0 g/dL Final   • RDW 07/24/2018 17.8* 12.0 - 15.0 % Final   •  RDW-SD 07/24/2018 61.8* 40.0 - 54.0 fl Final   • MPV 07/24/2018 10.3  6.0 - 12.0 fL Final   • Platelets 07/24/2018 218  130 - 400 10*3/mm3 Final   • Neutrophil % 07/24/2018 79.1* 39.0 - 78.0 % Final   • Lymphocyte % 07/24/2018 13.1* 15.0 - 45.0 % Final   • Monocyte % 07/24/2018 4.9  4.0 - 12.0 % Final   • Eosinophil % 07/24/2018 2.1  0.0 - 4.0 % Final   • Basophil % 07/24/2018 0.2  0.0 - 2.0 % Final   • Immature Grans % 07/24/2018 0.6  0.0 - 5.0 % Final   • Neutrophils, Absolute 07/24/2018 8.00  1.87 - 8.40 10*3/mm3 Final   • Lymphocytes, Absolute 07/24/2018 1.32  0.72 - 4.86 10*3/mm3 Final   • Monocytes, Absolute 07/24/2018 0.49  0.19 - 1.30 10*3/mm3 Final   • Eosinophils, Absolute 07/24/2018 0.21  0.00 - 0.70 10*3/mm3 Final   • Basophils, Absolute 07/24/2018 0.02  0.00 - 0.20 10*3/mm3 Final   • Immature Grans, Absolute 07/24/2018 0.06* 0.00 - 0.03 10*3/mm3 Final   • nRBC 07/24/2018 0.0  0.0 - 0.0 /100 WBC Final   Appointment on 07/18/2018   Component Date Value Ref Range Status   • Glucose 07/18/2018 223* 70 - 100 mg/dL Final   • BUN 07/18/2018 28* 5 - 21 mg/dL Final   • Creatinine 07/18/2018 2.39* 0.50 - 1.40 mg/dL Final   • Sodium 07/18/2018 141  135 - 145 mmol/L Final   • Potassium 07/18/2018 4.2  3.5 - 5.3 mmol/L Final   • Chloride 07/18/2018 102  98 - 110 mmol/L Final   • CO2 07/18/2018 24.0  24.0 - 31.0 mmol/L Final   • Calcium 07/18/2018 8.9  8.4 - 10.4 mg/dL Final   • Total Protein 07/18/2018 6.8  6.3 - 8.7 g/dL Final   • Albumin 07/18/2018 3.90  3.50 - 5.00 g/dL Final   • ALT (SGPT) 07/18/2018 22  0 - 54 U/L Final   • AST (SGOT) 07/18/2018 20  7 - 45 U/L Final   • Alkaline Phosphatase 07/18/2018 47  24 - 120 U/L Final   • Total Bilirubin 07/18/2018 0.2  0.1 - 1.0 mg/dL Final   • eGFR Non African Amer 07/18/2018 21* >60 mL/min/1.73 Final   • Globulin 07/18/2018 2.9  gm/dL Final   • A/G Ratio 07/18/2018 1.3  1.1 - 2.5 g/dL Final   • BUN/Creatinine Ratio 07/18/2018 11.7  7.0 - 25.0 Final   • Anion Gap  07/18/2018 15.0* 4.0 - 13.0 mmol/L Final   • WBC 07/18/2018 9.78  4.80 - 10.80 10*3/mm3 Final   • RBC 07/18/2018 2.46* 4.20 - 5.40 10*6/mm3 Final   • Hemoglobin 07/18/2018 7.4* 12.0 - 16.0 g/dL Final   • Hematocrit 07/18/2018 23.3* 37.0 - 47.0 % Final   • MCV 07/18/2018 94.7  82.0 - 98.0 fL Final   • MCH 07/18/2018 30.1  28.0 - 32.0 pg Final   • MCHC 07/18/2018 31.8* 33.0 - 36.0 g/dL Final   • RDW 07/18/2018 17.9* 12.0 - 15.0 % Final   • RDW-SD 07/18/2018 61.5* 40.0 - 54.0 fl Final   • MPV 07/18/2018 10.3  6.0 - 12.0 fL Final   • Platelets 07/18/2018 209  130 - 400 10*3/mm3 Final   • Neutrophil % 07/18/2018 72.4  39.0 - 78.0 % Final   • Lymphocyte % 07/18/2018 18.1  15.0 - 45.0 % Final   • Monocyte % 07/18/2018 5.9  4.0 - 12.0 % Final   • Eosinophil % 07/18/2018 2.7  0.0 - 4.0 % Final   • Basophil % 07/18/2018 0.2  0.0 - 2.0 % Final   • Immature Grans % 07/18/2018 0.7  0.0 - 5.0 % Final   • Neutrophils, Absolute 07/18/2018 7.08  1.87 - 8.40 10*3/mm3 Final   • Lymphocytes, Absolute 07/18/2018 1.77  0.72 - 4.86 10*3/mm3 Final   • Monocytes, Absolute 07/18/2018 0.58  0.19 - 1.30 10*3/mm3 Final   • Eosinophils, Absolute 07/18/2018 0.26  0.00 - 0.70 10*3/mm3 Final   • Basophils, Absolute 07/18/2018 0.02  0.00 - 0.20 10*3/mm3 Final   • Immature Grans, Absolute 07/18/2018 0.07* 0.00 - 0.03 10*3/mm3 Final   • nRBC 07/18/2018 0.0  0.0 - 0.0 /100 WBC Final       RADIOLOGY:  No results found.         Assessment/Plan        Anemia secondary to chronic kidney disease, Stage III. Patient has not required VALERIE therapy this far.  2. Iron deficiency in the setting of chronic kidney disease, has benefitted from Feraheme. Had Feraheme in January, hemoglobin has  now near normalized at 10.3.  Plan continue watchful waiting return to clinic in 3 months time and check another CBC and iron studies.  If the iron is still low I may give additional intravenous iron.  Her CMS guidelines I cannot give her Procrit unless hemoglobin is less than  10.    Clinically patient asymptomatic.  I will redraw her iron studies today.  Hemoglobin is a 10.  Return to clinic in 3 months time.            Marco A Melo MD    7/24/2018    9:24 AM             [facility]  HEMATOLOGY & ONCOLOGY        Subjective     VISIT DIAGNOSIS:   Encounter Diagnosis   Name Primary?   • Anemia in stage 3 chronic kidney disease        REASON FOR VISIT:     No chief complaint on file.       HEMATOLOGY / ONCOLOGY HISTORY:Ms. Shrestha is a 55 year old female with past medical history of hypertension, chronic kidney disease, diabetes insulindependent  type 2   No history exists.     [No treatment plan]  Cancer Staging Information:  No matching staging information was found for the patient.      INTERVAL HISTORY  Patient ID: Maria C Shrestha is a 58 y.o. year old female   Anemia secondary to chronic kidney disease, Stage III. Patient has not required VALERIE therapy this far.  2. Iron deficiency in the setting of chronic kidney disease, has benefitted from Feraheme. Had Feraheme in January, hemoglobin has  now near normalized at 10.7        Review of Systems   Constitutional: Negative.    HENT: Negative.    Eyes: Negative.    Respiratory: Negative.    Cardiovascular: Negative.    Gastrointestinal: Negative.    Endocrine: Negative.    Genitourinary: Negative.    Musculoskeletal: Negative.    Skin: Negative.    Allergic/Immunologic: Negative.    Neurological: Negative.    Hematological: Negative.    Psychiatric/Behavioral: Negative.             Medications:    Current Outpatient Prescriptions   Medication Sig Dispense Refill   • allopurinol (ZYLOPRIM) 100 MG tablet Take 100 mg by mouth 2 (Two) Times a Day.     • amLODIPine (NORVASC) 5 MG tablet      • aspirin 81 MG tablet Take 81 mg by mouth Daily. Stop 7/22/2018     • busPIRone (BUSPAR) 10 MG tablet buspirone 10 mg tablet   Take 2 tablets twice a day by oral route as needed for 90 days.     • carvedilol (COREG) 3.125 MG tablet Take 3.125  "mg by mouth 2 (Two) Times a Day.     • cetirizine (zyrTEC) 10 MG tablet Take 10 mg by mouth As Needed.     • Cholecalciferol (VITAMIN D3) 04611 units tablet Vitamin D2 50,000 unit capsule   Take 1 capsule every week by oral route. Sunday     • cinacalcet (SENSIPAR) 30 MG tablet Take 1 tablet by mouth Daily. 30 tablet 11   • citalopram (CeleXA) 20 MG tablet Take 20 mg by mouth Daily.     • diazePAM (VALIUM) 2 MG tablet Take 2 mg by mouth As Needed for Anxiety.     • fluticasone (VERAMYST) 27.5 MCG/SPRAY nasal spray 2 sprays into each nostril Daily.     • furosemide (LASIX) 40 MG tablet Take 1 tablet by mouth Daily. 90 tablet 3   • Glucose Blood (BLOOD GLUCOSE TEST) strip Use 4 x daily, use any brand covered by insurance or same brand as before 360 each 3   • HUMULIN R 500 UNIT/ML CONCENTRATED injection USING A U100 SYRINGE DRAW UP TO THE DIRECTED UNIT ROXI AND INJECT FOUR TIMES A DAY (UP TO 30 UNIT ROXI FOUR TIMES A DAY) (Patient taking differently: USING A U100 SYRINGE DRAW UP TO THE DIRECTED UNIT ROXI AND INJECT FOUR TIMES A DAY (UP TO 30 UNIT ROXI FOUR TIMES A DAY)  6 units with meals) 100 mL 3   • insulin glargine (LANTUS) 100 UNIT/ML injection 200 units daily (Patient taking differently: Inject 180 Units under the skin Every Night. 200 units daily) 18 each 3   • Insulin Syringe 31G X 5/16\" 1 ML misc 4 x daily 120 each 11   • lamoTRIgine (LaMICtal) 50 MG tablet dispersible disintegrating tablet Take 50 mg by mouth Every Night.     • Lancets (FREESTYLE) lancets FOUR TIMES A  each 11   • levothyroxine (SYNTHROID, LEVOTHROID) 50 MCG tablet Take 1 tablet by mouth Daily. 90 tablet 1   • lisinopril (PRINIVIL,ZESTRIL) 20 MG tablet Take 20 mg by mouth 2 (Two) Times a Day.     • Multiple Vitamins-Minerals (MULTIVITAMIN ADULT PO) Take 1 tablet by mouth Daily.     • norethindrone (AYGESTIN) 5 MG tablet Take 1 tablet by mouth Daily. 20 tablet 0   • potassium gluconate 595 (99 K) MG tablet tablet potassium gluconate " 595 mg (99 mg) tablet   Take 2 tablets every day by oral route.     • rosuvastatin (CRESTOR) 10 MG tablet Take 10 mg by mouth Daily.     • sodium bicarbonate 650 MG tablet Take 650 mg by mouth 3 (Three) Times a Day.     • TRULICITY 1.5 MG/0.5ML solution pen-injector INJECT 1.5 MG UNDER THE SKIN EVERY 7 DAYS (Patient taking differently: INJECT 1.5 MG UNDER THE SKIN EVERY 7 DAYS Sunday) 6 mL 3     No current facility-administered medications for this visit.        ALLERGIES:    Allergies   Allergen Reactions   • Codeine Nausea Only       Objective      @VITALS    Current Status 7/24/2018   ECOG score 1       General Appearance: Patient is awake, alert, oriented and in no acute distress. Patient is welldeveloped, wellnourished, and appears stated age.  HEENT: Normocephalic. Sclerae clear, conjunctiva pink, extraocular movements intact, pupils, round, reactive to light and  accommodation. Mouth and throat are clear with moist oral mucosa.  NECK: Supple, no jugular venous distention, thyroid not enlarged.  LYMPH: No cervical, supraclavicular, axillary, or inguinal lymphadenopathy.  CHEST: Equal bilateral expansion, AP  diameter normal, resonant percussion note  LUNGS: Good air movement, no rales, rhonchi, rubs or wheezes with auscultation  CARDIO: Regular sinus rhythm, no murmurs, gallops or rubs.  ABDOMEN: Nondistended, soft, No tenderness, no guarding, no rebound, No hepatosplenomegaly. No abdominal masses. Bowel sounds positive. No hernia  GENITALIA: Not examined.  BREASTS: Not examined.  MUSKEL: No joint swelling, decreased motion, or inflammation  EXTREMS: No edema, clubbing, cyanosis, No varicose veins.  NEURO: Grossly nonfocal, Gait is coordinated and smooth, Cognition is preserved.  SKIN: No rashes, no ecchymoses, no petechia.  PSYCH: Oriented to time, place and person. Memory is preserved. Mood and affect appear normal      RECENT LABS:  Lab on 07/24/2018   Component Date Value Ref Range Status   • Glucose  07/24/2018 212* 70 - 100 mg/dL Final   • BUN 07/24/2018 34* 5 - 21 mg/dL Final   • Creatinine 07/24/2018 2.42* 0.50 - 1.40 mg/dL Final   • Sodium 07/24/2018 141  135 - 145 mmol/L Final   • Potassium 07/24/2018 4.3  3.5 - 5.3 mmol/L Final   • Chloride 07/24/2018 108  98 - 110 mmol/L Final   • CO2 07/24/2018 20.0* 24.0 - 31.0 mmol/L Final   • Calcium 07/24/2018 7.7* 8.4 - 10.4 mg/dL Final   • Total Protein 07/24/2018 6.8  6.3 - 8.7 g/dL Final   • Albumin 07/24/2018 3.90  3.50 - 5.00 g/dL Final   • ALT (SGPT) 07/24/2018 30  0 - 54 U/L Final   • AST (SGOT) 07/24/2018 20  7 - 45 U/L Final   • Alkaline Phosphatase 07/24/2018 48  24 - 120 U/L Final   • Total Bilirubin 07/24/2018 0.2  0.1 - 1.0 mg/dL Final   • eGFR Non African Amer 07/24/2018 21* >60 mL/min/1.73 Final   • Globulin 07/24/2018 2.9  gm/dL Final   • A/G Ratio 07/24/2018 1.3  1.1 - 2.5 g/dL Final   • BUN/Creatinine Ratio 07/24/2018 14.0  7.0 - 25.0 Final   • Anion Gap 07/24/2018 13.0  4.0 - 13.0 mmol/L Final   • WBC 07/24/2018 10.10  4.80 - 10.80 10*3/mm3 Final   • RBC 07/24/2018 2.59* 4.20 - 5.40 10*6/mm3 Final   • Hemoglobin 07/24/2018 7.6* 12.0 - 16.0 g/dL Final   • Hematocrit 07/24/2018 24.7* 37.0 - 47.0 % Final   • MCV 07/24/2018 95.4  82.0 - 98.0 fL Final   • MCH 07/24/2018 29.3  28.0 - 32.0 pg Final   • MCHC 07/24/2018 30.8* 33.0 - 36.0 g/dL Final   • RDW 07/24/2018 17.8* 12.0 - 15.0 % Final   • RDW-SD 07/24/2018 61.8* 40.0 - 54.0 fl Final   • MPV 07/24/2018 10.3  6.0 - 12.0 fL Final   • Platelets 07/24/2018 218  130 - 400 10*3/mm3 Final   • Neutrophil % 07/24/2018 79.1* 39.0 - 78.0 % Final   • Lymphocyte % 07/24/2018 13.1* 15.0 - 45.0 % Final   • Monocyte % 07/24/2018 4.9  4.0 - 12.0 % Final   • Eosinophil % 07/24/2018 2.1  0.0 - 4.0 % Final   • Basophil % 07/24/2018 0.2  0.0 - 2.0 % Final   • Immature Grans % 07/24/2018 0.6  0.0 - 5.0 % Final   • Neutrophils, Absolute 07/24/2018 8.00  1.87 - 8.40 10*3/mm3 Final   • Lymphocytes, Absolute 07/24/2018 1.32   0.72 - 4.86 10*3/mm3 Final   • Monocytes, Absolute 07/24/2018 0.49  0.19 - 1.30 10*3/mm3 Final   • Eosinophils, Absolute 07/24/2018 0.21  0.00 - 0.70 10*3/mm3 Final   • Basophils, Absolute 07/24/2018 0.02  0.00 - 0.20 10*3/mm3 Final   • Immature Grans, Absolute 07/24/2018 0.06* 0.00 - 0.03 10*3/mm3 Final   • nRBC 07/24/2018 0.0  0.0 - 0.0 /100 WBC Final   Appointment on 07/18/2018   Component Date Value Ref Range Status   • Glucose 07/18/2018 223* 70 - 100 mg/dL Final   • BUN 07/18/2018 28* 5 - 21 mg/dL Final   • Creatinine 07/18/2018 2.39* 0.50 - 1.40 mg/dL Final   • Sodium 07/18/2018 141  135 - 145 mmol/L Final   • Potassium 07/18/2018 4.2  3.5 - 5.3 mmol/L Final   • Chloride 07/18/2018 102  98 - 110 mmol/L Final   • CO2 07/18/2018 24.0  24.0 - 31.0 mmol/L Final   • Calcium 07/18/2018 8.9  8.4 - 10.4 mg/dL Final   • Total Protein 07/18/2018 6.8  6.3 - 8.7 g/dL Final   • Albumin 07/18/2018 3.90  3.50 - 5.00 g/dL Final   • ALT (SGPT) 07/18/2018 22  0 - 54 U/L Final   • AST (SGOT) 07/18/2018 20  7 - 45 U/L Final   • Alkaline Phosphatase 07/18/2018 47  24 - 120 U/L Final   • Total Bilirubin 07/18/2018 0.2  0.1 - 1.0 mg/dL Final   • eGFR Non African Amer 07/18/2018 21* >60 mL/min/1.73 Final   • Globulin 07/18/2018 2.9  gm/dL Final   • A/G Ratio 07/18/2018 1.3  1.1 - 2.5 g/dL Final   • BUN/Creatinine Ratio 07/18/2018 11.7  7.0 - 25.0 Final   • Anion Gap 07/18/2018 15.0* 4.0 - 13.0 mmol/L Final   • WBC 07/18/2018 9.78  4.80 - 10.80 10*3/mm3 Final   • RBC 07/18/2018 2.46* 4.20 - 5.40 10*6/mm3 Final   • Hemoglobin 07/18/2018 7.4* 12.0 - 16.0 g/dL Final   • Hematocrit 07/18/2018 23.3* 37.0 - 47.0 % Final   • MCV 07/18/2018 94.7  82.0 - 98.0 fL Final   • MCH 07/18/2018 30.1  28.0 - 32.0 pg Final   • MCHC 07/18/2018 31.8* 33.0 - 36.0 g/dL Final   • RDW 07/18/2018 17.9* 12.0 - 15.0 % Final   • RDW-SD 07/18/2018 61.5* 40.0 - 54.0 fl Final   • MPV 07/18/2018 10.3  6.0 - 12.0 fL Final   • Platelets 07/18/2018 209  130 - 400  10*3/mm3 Final   • Neutrophil % 07/18/2018 72.4  39.0 - 78.0 % Final   • Lymphocyte % 07/18/2018 18.1  15.0 - 45.0 % Final   • Monocyte % 07/18/2018 5.9  4.0 - 12.0 % Final   • Eosinophil % 07/18/2018 2.7  0.0 - 4.0 % Final   • Basophil % 07/18/2018 0.2  0.0 - 2.0 % Final   • Immature Grans % 07/18/2018 0.7  0.0 - 5.0 % Final   • Neutrophils, Absolute 07/18/2018 7.08  1.87 - 8.40 10*3/mm3 Final   • Lymphocytes, Absolute 07/18/2018 1.77  0.72 - 4.86 10*3/mm3 Final   • Monocytes, Absolute 07/18/2018 0.58  0.19 - 1.30 10*3/mm3 Final   • Eosinophils, Absolute 07/18/2018 0.26  0.00 - 0.70 10*3/mm3 Final   • Basophils, Absolute 07/18/2018 0.02  0.00 - 0.20 10*3/mm3 Final   • Immature Grans, Absolute 07/18/2018 0.07* 0.00 - 0.03 10*3/mm3 Final   • nRBC 07/18/2018 0.0  0.0 - 0.0 /100 WBC Final       RADIOLOGY:  No results found.         Assessment/Plan        Anemia secondary to chronic kidney disease, Stage III. .  2. Iron deficiency in the setting of chronic kidney disease, has benefitted from Feraheme. Had Feraheme in January, hemoglobin has  now near normalized at 10.1.  Plan continue watchful waiting return to clinic in 3 months time and check another CBC and iron studies.  If the iron is still low I may give additional intravenous iron.  .    Clinically patient asymptomatic.  I will redraw her iron studies today.  Hemoglobin is a 9.7gm Today. Per CMS guidelines Rx Procrit 40,000 u s/q every month.    She has been bleeding per vaginum and is planned D & C. Heavy set Obese women are more prone to Uterine carcinomas, therefore, will check , 9 especially when she tells me her grand mom had Uterine CA ).  Awaiting D&C tommorow. My cut off for blood xfusion is a Ghb of 7gm%. She is 7.6. Because of Bleeding I fear she may be low in Iron. Will check Iron panel today, if low will infuse Ferraheme and then Rx with Procrit for CKD.       Marco A Melo MD    7/24/2018    9:24 AM

## 2018-07-24 NOTE — TELEPHONE ENCOUNTER
Left message for Maria C that she is scheduled for procrit and feraheme on 7/26/18.  Notified her that iron sat 12 and Dr. Melo ordered feraheme weekly x2 weeks.

## 2018-07-25 ENCOUNTER — APPOINTMENT (OUTPATIENT)
Dept: LAB | Facility: HOSPITAL | Age: 58
End: 2018-07-25
Attending: INTERNAL MEDICINE

## 2018-07-25 ENCOUNTER — HOSPITAL ENCOUNTER (OUTPATIENT)
Facility: HOSPITAL | Age: 58
Setting detail: HOSPITAL OUTPATIENT SURGERY
Discharge: HOME | End: 2018-07-25
Attending: OBSTETRICS & GYNECOLOGY | Admitting: OBSTETRICS & GYNECOLOGY

## 2018-07-25 ENCOUNTER — APPOINTMENT (OUTPATIENT)
Dept: ONCOLOGY | Facility: HOSPITAL | Age: 58
End: 2018-07-25
Attending: INTERNAL MEDICINE

## 2018-07-25 ENCOUNTER — ANESTHESIA EVENT (OUTPATIENT)
Dept: PERIOP | Facility: HOSPITAL | Age: 58
End: 2018-07-25

## 2018-07-25 ENCOUNTER — ANESTHESIA (OUTPATIENT)
Dept: PERIOP | Facility: HOSPITAL | Age: 58
End: 2018-07-25

## 2018-07-25 VITALS
OXYGEN SATURATION: 94 % | RESPIRATION RATE: 18 BRPM | TEMPERATURE: 98.2 F | DIASTOLIC BLOOD PRESSURE: 85 MMHG | SYSTOLIC BLOOD PRESSURE: 102 MMHG | HEART RATE: 75 BPM

## 2018-07-25 DIAGNOSIS — N95.0 POSTMENOPAUSAL BLEEDING: ICD-10-CM

## 2018-07-25 LAB
GLUCOSE BLDC GLUCOMTR-MCNC: 180 MG/DL (ref 70–130)
GLUCOSE BLDC GLUCOMTR-MCNC: 187 MG/DL (ref 70–130)

## 2018-07-25 PROCEDURE — 25010000002 METOCLOPRAMIDE PER 10 MG: Performed by: ANESTHESIOLOGY

## 2018-07-25 PROCEDURE — 82962 GLUCOSE BLOOD TEST: CPT

## 2018-07-25 PROCEDURE — 25010000002 DEXAMETHASONE PER 1 MG: Performed by: NURSE ANESTHETIST, CERTIFIED REGISTERED

## 2018-07-25 PROCEDURE — 58558 HYSTEROSCOPY BIOPSY: CPT | Performed by: OBSTETRICS & GYNECOLOGY

## 2018-07-25 PROCEDURE — 25010000002 MIDAZOLAM PER 1 MG: Performed by: ANESTHESIOLOGY

## 2018-07-25 PROCEDURE — 25010000002 ONDANSETRON PER 1 MG: Performed by: NURSE ANESTHETIST, CERTIFIED REGISTERED

## 2018-07-25 PROCEDURE — 25010000002 SUCCINYLCHOLINE PER 20 MG: Performed by: NURSE ANESTHETIST, CERTIFIED REGISTERED

## 2018-07-25 PROCEDURE — 25010000002 PROPOFOL 10 MG/ML EMULSION: Performed by: NURSE ANESTHETIST, CERTIFIED REGISTERED

## 2018-07-25 PROCEDURE — 25010000002 FENTANYL CITRATE (PF) 100 MCG/2ML SOLUTION: Performed by: NURSE ANESTHETIST, CERTIFIED REGISTERED

## 2018-07-25 PROCEDURE — 88305 TISSUE EXAM BY PATHOLOGIST: CPT | Performed by: OBSTETRICS & GYNECOLOGY

## 2018-07-25 RX ORDER — DEXAMETHASONE SODIUM PHOSPHATE 4 MG/ML
INJECTION, SOLUTION INTRA-ARTICULAR; INTRALESIONAL; INTRAMUSCULAR; INTRAVENOUS; SOFT TISSUE AS NEEDED
Status: DISCONTINUED | OUTPATIENT
Start: 2018-07-25 | End: 2018-07-25 | Stop reason: SURG

## 2018-07-25 RX ORDER — OXYCODONE AND ACETAMINOPHEN 7.5; 325 MG/1; MG/1
2 TABLET ORAL ONCE AS NEEDED
Status: DISCONTINUED | OUTPATIENT
Start: 2018-07-25 | End: 2018-07-25 | Stop reason: HOSPADM

## 2018-07-25 RX ORDER — SODIUM CHLORIDE 9 MG/ML
500 INJECTION, SOLUTION INTRAVENOUS CONTINUOUS
Status: DISCONTINUED | OUTPATIENT
Start: 2018-07-25 | End: 2018-07-25 | Stop reason: HOSPADM

## 2018-07-25 RX ORDER — TRAMADOL HYDROCHLORIDE 50 MG/1
50 TABLET ORAL EVERY 6 HOURS PRN
Qty: 8 TABLET | Refills: 0 | Status: SHIPPED | OUTPATIENT
Start: 2018-07-25 | End: 2020-12-21

## 2018-07-25 RX ORDER — NALOXONE HCL 0.4 MG/ML
0.4 VIAL (ML) INJECTION AS NEEDED
Status: DISCONTINUED | OUTPATIENT
Start: 2018-07-25 | End: 2018-07-25 | Stop reason: HOSPADM

## 2018-07-25 RX ORDER — MIDAZOLAM HYDROCHLORIDE 1 MG/ML
2 INJECTION INTRAMUSCULAR; INTRAVENOUS
Status: DISCONTINUED | OUTPATIENT
Start: 2018-07-25 | End: 2018-07-25 | Stop reason: HOSPADM

## 2018-07-25 RX ORDER — ONDANSETRON 2 MG/ML
4 INJECTION INTRAMUSCULAR; INTRAVENOUS ONCE AS NEEDED
Status: DISCONTINUED | OUTPATIENT
Start: 2018-07-25 | End: 2018-07-25 | Stop reason: HOSPADM

## 2018-07-25 RX ORDER — SODIUM CHLORIDE, SODIUM LACTATE, POTASSIUM CHLORIDE, CALCIUM CHLORIDE 600; 310; 30; 20 MG/100ML; MG/100ML; MG/100ML; MG/100ML
1000 INJECTION, SOLUTION INTRAVENOUS CONTINUOUS
Status: DISCONTINUED | OUTPATIENT
Start: 2018-07-25 | End: 2018-07-25

## 2018-07-25 RX ORDER — LIDOCAINE HYDROCHLORIDE 20 MG/ML
INJECTION, SOLUTION INFILTRATION; PERINEURAL AS NEEDED
Status: DISCONTINUED | OUTPATIENT
Start: 2018-07-25 | End: 2018-07-25 | Stop reason: SURG

## 2018-07-25 RX ORDER — ONDANSETRON 2 MG/ML
INJECTION INTRAMUSCULAR; INTRAVENOUS AS NEEDED
Status: DISCONTINUED | OUTPATIENT
Start: 2018-07-25 | End: 2018-07-25 | Stop reason: SURG

## 2018-07-25 RX ORDER — METOCLOPRAMIDE HYDROCHLORIDE 5 MG/ML
5 INJECTION INTRAMUSCULAR; INTRAVENOUS
Status: DISCONTINUED | OUTPATIENT
Start: 2018-07-25 | End: 2018-07-25 | Stop reason: HOSPADM

## 2018-07-25 RX ORDER — ROCURONIUM BROMIDE 10 MG/ML
INJECTION, SOLUTION INTRAVENOUS AS NEEDED
Status: DISCONTINUED | OUTPATIENT
Start: 2018-07-25 | End: 2018-07-25 | Stop reason: SURG

## 2018-07-25 RX ORDER — SODIUM CHLORIDE, SODIUM LACTATE, POTASSIUM CHLORIDE, CALCIUM CHLORIDE 600; 310; 30; 20 MG/100ML; MG/100ML; MG/100ML; MG/100ML
100 INJECTION, SOLUTION INTRAVENOUS CONTINUOUS
Status: DISCONTINUED | OUTPATIENT
Start: 2018-07-25 | End: 2018-07-25 | Stop reason: HOSPADM

## 2018-07-25 RX ORDER — IBUPROFEN 600 MG/1
600 TABLET ORAL ONCE AS NEEDED
Status: DISCONTINUED | OUTPATIENT
Start: 2018-07-25 | End: 2018-07-25 | Stop reason: HOSPADM

## 2018-07-25 RX ORDER — FENTANYL CITRATE 50 UG/ML
INJECTION, SOLUTION INTRAMUSCULAR; INTRAVENOUS AS NEEDED
Status: DISCONTINUED | OUTPATIENT
Start: 2018-07-25 | End: 2018-07-25 | Stop reason: SURG

## 2018-07-25 RX ORDER — SODIUM CHLORIDE 0.9 % (FLUSH) 0.9 %
1-10 SYRINGE (ML) INJECTION AS NEEDED
Status: DISCONTINUED | OUTPATIENT
Start: 2018-07-25 | End: 2018-07-25 | Stop reason: HOSPADM

## 2018-07-25 RX ORDER — ACETAMINOPHEN 500 MG
1000 TABLET ORAL ONCE
Status: COMPLETED | OUTPATIENT
Start: 2018-07-25 | End: 2018-07-25

## 2018-07-25 RX ORDER — SUCCINYLCHOLINE CHLORIDE 20 MG/ML
INJECTION INTRAMUSCULAR; INTRAVENOUS AS NEEDED
Status: DISCONTINUED | OUTPATIENT
Start: 2018-07-25 | End: 2018-07-25 | Stop reason: SURG

## 2018-07-25 RX ORDER — MIDAZOLAM HYDROCHLORIDE 1 MG/ML
1 INJECTION INTRAMUSCULAR; INTRAVENOUS
Status: DISCONTINUED | OUTPATIENT
Start: 2018-07-25 | End: 2018-07-25 | Stop reason: HOSPADM

## 2018-07-25 RX ORDER — SODIUM CHLORIDE 0.9 % (FLUSH) 0.9 %
3 SYRINGE (ML) INJECTION AS NEEDED
Status: DISCONTINUED | OUTPATIENT
Start: 2018-07-25 | End: 2018-07-25 | Stop reason: HOSPADM

## 2018-07-25 RX ORDER — MAGNESIUM HYDROXIDE 1200 MG/15ML
LIQUID ORAL AS NEEDED
Status: DISCONTINUED | OUTPATIENT
Start: 2018-07-25 | End: 2018-07-25 | Stop reason: HOSPADM

## 2018-07-25 RX ORDER — IPRATROPIUM BROMIDE AND ALBUTEROL SULFATE 2.5; .5 MG/3ML; MG/3ML
3 SOLUTION RESPIRATORY (INHALATION) ONCE AS NEEDED
Status: DISCONTINUED | OUTPATIENT
Start: 2018-07-25 | End: 2018-07-25 | Stop reason: HOSPADM

## 2018-07-25 RX ORDER — OXYCODONE AND ACETAMINOPHEN 10; 325 MG/1; MG/1
1 TABLET ORAL ONCE AS NEEDED
Status: COMPLETED | OUTPATIENT
Start: 2018-07-25 | End: 2018-07-25

## 2018-07-25 RX ORDER — METOCLOPRAMIDE HYDROCHLORIDE 5 MG/ML
10 INJECTION INTRAMUSCULAR; INTRAVENOUS ONCE AS NEEDED
Status: COMPLETED | OUTPATIENT
Start: 2018-07-25 | End: 2018-07-25

## 2018-07-25 RX ORDER — FENTANYL CITRATE 50 UG/ML
25 INJECTION, SOLUTION INTRAMUSCULAR; INTRAVENOUS AS NEEDED
Status: DISCONTINUED | OUTPATIENT
Start: 2018-07-25 | End: 2018-07-25 | Stop reason: HOSPADM

## 2018-07-25 RX ORDER — MEPERIDINE HYDROCHLORIDE 50 MG/ML
12.5 INJECTION INTRAMUSCULAR; INTRAVENOUS; SUBCUTANEOUS
Status: DISCONTINUED | OUTPATIENT
Start: 2018-07-25 | End: 2018-07-25 | Stop reason: HOSPADM

## 2018-07-25 RX ORDER — PROPOFOL 10 MG/ML
VIAL (ML) INTRAVENOUS AS NEEDED
Status: DISCONTINUED | OUTPATIENT
Start: 2018-07-25 | End: 2018-07-25 | Stop reason: SURG

## 2018-07-25 RX ORDER — LABETALOL HYDROCHLORIDE 5 MG/ML
5 INJECTION, SOLUTION INTRAVENOUS
Status: DISCONTINUED | OUTPATIENT
Start: 2018-07-25 | End: 2018-07-25 | Stop reason: HOSPADM

## 2018-07-25 RX ADMIN — OXYCODONE HYDROCHLORIDE AND ACETAMINOPHEN 1 TABLET: 10; 325 TABLET ORAL at 11:10

## 2018-07-25 RX ADMIN — SODIUM CHLORIDE, POTASSIUM CHLORIDE, SODIUM LACTATE AND CALCIUM CHLORIDE: 600; 310; 30; 20 INJECTION, SOLUTION INTRAVENOUS at 10:02

## 2018-07-25 RX ADMIN — ACETAMINOPHEN 1000 MG: 500 TABLET, FILM COATED ORAL at 09:38

## 2018-07-25 RX ADMIN — PROPOFOL 200 MG: 10 INJECTION, EMULSION INTRAVENOUS at 10:07

## 2018-07-25 RX ADMIN — SUCCINYLCHOLINE CHLORIDE 120 MG: 20 INJECTION, SOLUTION INTRAMUSCULAR; INTRAVENOUS at 10:07

## 2018-07-25 RX ADMIN — DEXAMETHASONE SODIUM PHOSPHATE 4 MG: 4 INJECTION, SOLUTION INTRAMUSCULAR; INTRAVENOUS at 10:16

## 2018-07-25 RX ADMIN — FENTANYL CITRATE 100 MCG: 50 INJECTION, SOLUTION INTRAMUSCULAR; INTRAVENOUS at 10:07

## 2018-07-25 RX ADMIN — ROCURONIUM BROMIDE 25 MG: 10 INJECTION INTRAVENOUS at 10:09

## 2018-07-25 RX ADMIN — SUGAMMADEX 200 MG: 100 INJECTION, SOLUTION INTRAVENOUS at 10:34

## 2018-07-25 RX ADMIN — ONDANSETRON HYDROCHLORIDE 4 MG: 2 SOLUTION INTRAMUSCULAR; INTRAVENOUS at 10:16

## 2018-07-25 RX ADMIN — MIDAZOLAM 2 MG: 1 INJECTION INTRAMUSCULAR; INTRAVENOUS at 09:46

## 2018-07-25 RX ADMIN — LIDOCAINE HYDROCHLORIDE 80 MG: 20 INJECTION, SOLUTION INFILTRATION; PERINEURAL at 10:07

## 2018-07-25 RX ADMIN — METOCLOPRAMIDE 10 MG: 5 INJECTION, SOLUTION INTRAMUSCULAR; INTRAVENOUS at 09:38

## 2018-07-25 RX ADMIN — ROCURONIUM BROMIDE 5 MG: 10 INJECTION INTRAVENOUS at 10:07

## 2018-07-25 RX ADMIN — LIDOCAINE HYDROCHLORIDE 0.5 ML: 10 INJECTION, SOLUTION EPIDURAL; INFILTRATION; INTRACAUDAL; PERINEURAL at 09:11

## 2018-07-25 RX ADMIN — SODIUM CHLORIDE 500 ML: 0.9 INJECTION, SOLUTION INTRAVENOUS at 09:13

## 2018-07-25 NOTE — ANESTHESIA PREPROCEDURE EVALUATION
Anesthesia Evaluation     Patient summary reviewed   no history of anesthetic complications:  NPO Solid Status: > 8 hours  NPO Liquid Status: > 8 hours           Airway   Mallampati: II  TM distance: <3 FB  Neck ROM: full  Possible difficult intubation  Dental      Comment: A few missing teeth, denies loose    Pulmonary    (+) shortness of breath, sleep apnea on CPAP,   (-) COPD, asthma, not a smoker  Cardiovascular     ECG reviewed  Patient on routine beta blocker and Beta blocker given within 24 hours of surgery    (+) hypertension, PVD (IVC filter), hyperlipidemia,   (-) past MI, CAD, angina, cardiac stents      Neuro/Psych  (-) seizures, TIA, CVA  GI/Hepatic/Renal/Endo    (+) morbid obesity,  renal disease CRI, diabetes mellitus, hypothyroidism,     Musculoskeletal     Abdominal    Substance History      OB/GYN          Other                      Anesthesia Plan    ASA 3     general     intravenous induction   Anesthetic plan and risks discussed with patient.

## 2018-07-25 NOTE — ANESTHESIA POSTPROCEDURE EVALUATION
Patient: Maria C Shrestha    Procedure Summary     Date:  07/25/18 Room / Location:   [facility] /  [facility]    Anesthesia Start:  1002 Anesthesia Stop:  1043    Procedure:  DILATATION AND CURETTAGE HYSTEROSCOPY (N/A Uterus) Diagnosis:       Postmenopausal bleeding      (Postmenopausal bleeding [N95.0])    Surgeon:  Michael Castaneda MD Provider:  Michael Burks CRNA    Anesthesia Type:  general ASA Status:  3          Anesthesia Type: general  Last vitals  BP   159/59 (07/25/18 1203)   Temp   98.2 °F (36.8 °C) (07/25/18 1126)   Pulse   67 (07/25/18 1203)   Resp   20 (07/25/18 1203)     SpO2   92 % (07/25/18 1203)     Post Anesthesia Care and Evaluation    Patient location during evaluation: PACU  Patient participation: complete - patient participated  Level of consciousness: awake and alert  Pain management: adequate  Airway patency: patent  Anesthetic complications: No anesthetic complications  PONV Status: none  Cardiovascular status: acceptable and hemodynamically stable  Respiratory status: acceptable  Hydration status: acceptable    Comments: Blood pressure 159/59, pulse 67, temperature 98.2 °F (36.8 °C), temperature source Temporal Artery , resp. rate 20, SpO2 92 %, not currently breastfeeding.    Patient discharged from PACU based upon Jamin score. Please see RN notes for further details

## 2018-07-25 NOTE — DISCHARGE INSTRUCTIONS

## 2018-07-25 NOTE — ANESTHESIA PROCEDURE NOTES
Airway  Urgency: elective    Airway not difficult    General Information and Staff    Patient location during procedure: OR  CRNA: PREETI BLACKMAN    Indications and Patient Condition  Indications for airway management: airway protection    Preoxygenated: yes  MILS maintained throughout  Mask difficulty assessment: 1 - vent by mask    Final Airway Details  Final airway type: endotracheal airway      Successful airway: ETT  Cuffed: yes   Successful intubation technique: direct laryngoscopy and video laryngoscopy  Endotracheal tube insertion site: oral  Blade: Brown  Blade size: #2  ETT size: 7.5 mm  Cormack-Lehane Classification: grade I - full view of glottis  Placement verified by: chest auscultation and capnometry   Cuff volume (mL): 10  Measured from: lips  ETT to lips (cm): 22  Number of attempts at approach: 1    Additional Comments  Easy mask airway with head of bed up, small mouth opening, CMAC used with direct visual of cords and ett pased easily

## 2018-07-25 NOTE — OP NOTE
[facility]  Maria C Shrestha  : 1960  MRN: [id number]  [facility]: [id number]  Date: 2018    Operative Note    DILATATION AND CURETTAGE HYSTEROSCOPY      Pre-op Diagnosis:  Postmenopausal bleeding [N95.0]   Post-op Diagnosis:  Post-Op Diagnosis Codes:     * Postmenopausal bleeding [N95.0]   Procedure: Procedure(s):  DILATATION AND CURETTAGE HYSTEROSCOPY   Surgeon: Surgeon(s):  Michael Castaneda MD       Anesthesia: General     Estimated Blood Loss: minimal   mls   Fluids: 500   mls   UOP: 50   mls   Drains: none   ABx: none     Specimens:  Endometrial curettings   Findings: Diffuse fluffy endometrium, tiny right submucosal appearing fibroid   Complications: none      INDICATION: Maria C Shrestha is a 58 y.o. female  with postmenopausal bleeding.  On ultrasound the endometrial stripe measures 15mm.  She has failed medical therapy and developed anemia.  PROCEDURE IN DETAIL: After informed consent was obtained, the patient was taken to the operating room, where general anesthesia was administered. She was placed in the lithotomy position in [facility], and her perineum and vagina were prepped and draped in normal sterile fashion. A weighted speculum was placed in the vagina and her bladder drained with a metal catheter. The anterior lip of the cervix was visualized and grasped with a single-tooth tenaculum.  The uterus sounded to 8 cm.  The cervix was dilated with Hegar dilators to accept a diagnostic hysteroscope.  Normal saline was used for distention and the cavity was inspected showing the findings noted above.  The tubal ostia were both identified.  The hysteroscope was removed.  A medium size sharp curette was then placed in the uterus and the endometrium curetted until a gritty texture was noted in all 4 quadrants.  The curettings were collected on a Telfa pad.  The cervix was then observed and no active bleeding was noted.  The tenaculum was removed and the cervix was hemostatic.  All instruments were  then removed from the vagina.   The patient was then awakened and taken to the recovery room in stable condition.  She tolerated the procedure well and without complications.  All sponge needle and instrument counts were correct times 3 per the OR staff.    Michael Castaneda MD   7/25/2018  10:36 AM

## 2018-07-25 NOTE — H&P (VIEW-ONLY)
[facility]  Maria C Shrestha  : 1960  MRN: [id number]  CSN: [id number]    History and Physical    Subjective   Maria C Shrestha is a 58 y.o. year old  who presents for surgery due to postmenopausal bleeding and endometrial thickening.  Her endometrial stripe measures 13 mm on ultrasound and she has developed anemia with a hemoglobin of 8.9.  Her BMI is 65.    Past Medical History:   Diagnosis Date   • Acquired hypothyroidism 2017   • Allergic    • Anemia    • Anxiety    • Arthritis    • Chronic kidney disease     3rd stage   • Depression    • Disease of thyroid gland    • Dyslipidemia    • Elevated cholesterol    • Essential hypertension    • Gallbladder abscess    • Obesity    • Type 2 diabetes mellitus (CMS/HCC)    • Type II diabetes mellitus, uncontrolled (CMS/HCC)      Past Surgical History:   Procedure Laterality Date   • ADENOIDECTOMY     • CHOLECYSTECTOMY     • COLONOSCOPY  2014   • COLONOSCOPY N/A 3/22/2017    Procedure: COLONOSCOPY WITH ANESTHESIA;  Surgeon: Mono Linder MD;  Location: [facility] ENDOSCOPY;  Service:    • DILATATION AND CURETTAGE      X 2   • ENDOSCOPY     • TONSILLECTOMY       Smoking status: Never Smoker                                                              Smokeless tobacco: Never Used                          Current Outpatient Prescriptions:   •  allopurinol (ZYLOPRIM) 100 MG tablet, Take 100 mg by mouth 2 (Two) Times a Day., Disp: , Rfl:   •  aspirin 81 MG tablet, Take 81 mg by mouth Daily., Disp: , Rfl:   •  busPIRone (BUSPAR) 10 MG tablet, buspirone 10 mg tablet  Take 2 tablets twice a day by oral route as needed for 90 days., Disp: , Rfl:   •  calcitriol (ROCALTROL) 0.25 MCG capsule, Take 0.25 mcg by mouth 3 (Three) Times a Week., Disp: , Rfl:   •  Calcium Carb-Cholecalciferol (CALCIUM-VITAMIN D3) 600-400 MG-UNIT tablet, Take 1 tablet by mouth 2 (Two) Times a Day., Disp: , Rfl:   •  carvedilol (COREG) 3.125 MG tablet, Take 3.125 mg by mouth 2 (Two) Times  "a Day., Disp: , Rfl:   •  cetirizine (zyrTEC) 10 MG tablet, Take 10 mg by mouth As Needed., Disp: , Rfl:   •  Cholecalciferol (VITAMIN D3) 35450 units tablet, Vitamin D2 50,000 unit capsule  Take 1 capsule every week by oral route., Disp: , Rfl:   •  citalopram (CeleXA) 20 MG tablet, Take 20 mg by mouth Daily., Disp: , Rfl:   •  fluticasone (VERAMYST) 27.5 MCG/SPRAY nasal spray, 2 sprays into each nostril Daily., Disp: , Rfl:   •  furosemide (LASIX) 40 MG tablet, Take 1 tablet by mouth Daily., Disp: 90 tablet, Rfl: 3  •  Glucose Blood (BLOOD GLUCOSE TEST) strip, Use 4 x daily, use any brand covered by insurance or same brand as before, Disp: 360 each, Rfl: 3  •  HUMULIN R 500 UNIT/ML CONCENTRATED injection, USING A U100 SYRINGE DRAW UP TO THE DIRECTED UNIT ROXI AND INJECT FOUR TIMES A DAY (UP TO 30 UNIT ROXI FOUR TIMES A DAY), Disp: 100 mL, Rfl: 3  •  insulin glargine (LANTUS) 100 UNIT/ML injection, 200 units daily, Disp: 18 each, Rfl: 3  •  Insulin Syringe 31G X 5/16\" 1 ML misc, 4 x daily, Disp: 120 each, Rfl: 11  •  lamoTRIgine (LaMICtal) 50 MG tablet dispersible disintegrating tablet, Take 50 mg by mouth Daily., Disp: , Rfl:   •  Lancets (FREESTYLE) lancets, FOUR TIMES A DAY, Disp: 150 each, Rfl: 11  •  levothyroxine (SYNTHROID, LEVOTHROID) 50 MCG tablet, Take 1 tablet by mouth Daily., Disp: 90 tablet, Rfl: 1  •  lisinopril (PRINIVIL,ZESTRIL) 20 MG tablet, Take 20 mg by mouth 2 (Two) Times a Day., Disp: , Rfl:   •  Multiple Vitamins-Minerals (MULTIVITAMIN ADULT PO), Take 1 tablet by mouth Daily., Disp: , Rfl:   •  potassium gluconate 595 (99 K) MG tablet tablet, potassium gluconate 595 mg (99 mg) tablet  Take 2 tablets every day by oral route., Disp: , Rfl:   •  rosuvastatin (CRESTOR) 10 MG tablet, Take 10 mg by mouth Daily., Disp: , Rfl:   •  sodium bicarbonate 650 MG tablet, Take 650 mg by mouth 3 (Three) Times a Day., Disp: , Rfl:   •  TRULICITY 1.5 MG/0.5ML solution pen-injector, INJECT 1.5 MG UNDER THE SKIN " "EVERY 7 DAYS, Disp: 6 mL, Rfl: 3  •  cinacalcet (SENSIPAR) 30 MG tablet, Take 1 tablet by mouth Daily., Disp: 30 tablet, Rfl: 11  •  norethindrone (AYGESTIN) 5 MG tablet, Take 1 tablet by mouth Daily., Disp: 20 tablet, Rfl: 0    Allergies   Allergen Reactions   • Codeine Nausea Only       Family History   Problem Relation Age of Onset   • Diabetes Other    • Cancer Mother    • Cancer Father    • Heart disease Father    • Diabetes Father    • Obesity Father    • Stroke Father    • Cancer Maternal Grandmother    • Uterine cancer Maternal Grandmother    • Diabetes Maternal Grandfather    • Cancer Paternal Grandmother    • Colon cancer Paternal Grandmother    • Diabetes Paternal Grandfather    • Heart disease Paternal Grandfather    • No Known Problems Sister    • No Known Problems Brother    • No Known Problems Daughter    • No Known Problems Son    • No Known Problems Maternal Aunt    • No Known Problems Paternal Aunt    • BRCA 1/2 Neg Hx    • Breast cancer Neg Hx    • Endometrial cancer Neg Hx    • Ovarian cancer Neg Hx      Review of Systems   Constitutional: Negative for unexpected weight change.   HENT: Negative for trouble swallowing.    Respiratory: Negative for shortness of breath.    Cardiovascular: Negative for chest pain.   Musculoskeletal: Negative for neck stiffness.   Neurological: Negative for seizures.   Hematological: Does not bruise/bleed easily.         Objective   /84 (BP Location: Other (Comment), Patient Position: Sitting, Cuff Size: Large Adult)   Ht 165.1 cm (65\")   Wt (!) 180 kg (396 lb)   LMP  (LMP Unknown)   Breastfeeding? No   BMI 65.90 kg/m²     Physical Exam   Physical Exam   Constitutional: She is oriented to person, place, and time. She appears well-developed and well-nourished.   HENT:   Head: Normocephalic and atraumatic.   Eyes: No scleral icterus.   Neck: No tracheal deviation present.   Cardiovascular: Normal rate and regular rhythm.    Pulmonary/Chest: Effort normal and " breath sounds normal.   Abdominal: Soft. Bowel sounds are normal. She exhibits no distension. There is no tenderness.   Genitourinary: Uterus normal. Rectal exam shows no external hemorrhoid. Pelvic exam was performed with patient supine. There is no lesion on the right labia. There is no lesion on the left labia. Uterus is not enlarged, not fixed and not tender. Cervix exhibits no motion tenderness. Right adnexum displays no mass. Left adnexum displays no mass. No bleeding in the vagina. No vaginal discharge found.   Lymphadenopathy:        Right: No inguinal adenopathy present.        Left: No inguinal adenopathy present.   Neurological: She is alert and oriented to person, place, and time.   Skin: Skin is warm and dry.   Vitals reviewed.      Labs  Lab Results   Component Value Date     07/05/2018    HGB 8.9 (L) 07/05/2018    HCT 27.1 (L) 07/05/2018    WBC 14.00 (H) 07/05/2018     07/05/2018    K 4.5 07/05/2018     07/05/2018    CO2 25.0 07/05/2018    BUN 48 (H) 07/05/2018    CREATININE 2.38 (H) 07/05/2018    GLUCOSE 243 (H) 07/05/2018    ALBUMIN 3.80 07/05/2018    CALCIUM 11.2 (H) 07/05/2018    AST 20 07/05/2018    ALT 15 07/05/2018    BILITOT 0.3 07/05/2018        Assessment & Plan    Maria C was seen today for post menopausal bleeding.    Diagnoses and all orders for this visit:    Postmenopausal bleeding  -     Case Request  -     norethindrone (AYGESTIN) 5 MG tablet; Take 1 tablet by mouth Daily.    BMI 60.0-69.9, adult (CMS/HCC)    Risks, benefits, and alternatives of a D&C with hysteroscopy were discussed with the patient in detail. Intraoperative risks of bleeding and damage to surrounding organs, including but not limited to intestine, bladder and ureter, were explained. Management of these were also explained. Postoperative complications such as infection, pneumonia, DVT, and bleeding were explained. The importance of compliance with postoperative restrictions was discussed. The  diagnostic nature of the procedure was explained.    Uterine perforation was discussed with the patient at length.     All of the patient's questions were answered to her satisfaction. She was encouraged to return for an additional appointment if she had further questions. She verbalized understanding of the above and wished to proceed with the outlined plan.        Michael Castaneda MD  7/11/2018  2:15 PM

## 2018-07-26 ENCOUNTER — INFUSION (OUTPATIENT)
Dept: ONCOLOGY | Facility: HOSPITAL | Age: 58
End: 2018-07-26
Attending: INTERNAL MEDICINE

## 2018-07-26 VITALS
DIASTOLIC BLOOD PRESSURE: 46 MMHG | RESPIRATION RATE: 20 BRPM | WEIGHT: 293 LBS | HEIGHT: 60 IN | SYSTOLIC BLOOD PRESSURE: 150 MMHG | TEMPERATURE: 98.9 F | OXYGEN SATURATION: 100 % | BODY MASS INDEX: 57.52 KG/M2 | HEART RATE: 68 BPM

## 2018-07-26 DIAGNOSIS — N18.30 ANEMIA IN STAGE 3 CHRONIC KIDNEY DISEASE (HCC): Primary | ICD-10-CM

## 2018-07-26 DIAGNOSIS — D63.1 ANEMIA IN STAGE 3 CHRONIC KIDNEY DISEASE (HCC): Primary | ICD-10-CM

## 2018-07-26 PROCEDURE — 25010000002 FERUMOXYTOL 510 MG/17ML SOLUTION 510 MG VIAL: Performed by: INTERNAL MEDICINE

## 2018-07-26 PROCEDURE — 96365 THER/PROPH/DIAG IV INF INIT: CPT

## 2018-07-26 PROCEDURE — 96374 THER/PROPH/DIAG INJ IV PUSH: CPT

## 2018-07-26 PROCEDURE — 96372 THER/PROPH/DIAG INJ SC/IM: CPT

## 2018-07-26 PROCEDURE — 63510000001 EPOETIN ALFA PER 1000 UNITS: Performed by: INTERNAL MEDICINE

## 2018-07-26 RX ORDER — SODIUM CHLORIDE 9 MG/ML
250 INJECTION, SOLUTION INTRAVENOUS ONCE
Status: COMPLETED | OUTPATIENT
Start: 2018-07-26 | End: 2018-07-26

## 2018-07-26 RX ADMIN — FERUMOXYTOL 510 MG: 510 INJECTION INTRAVENOUS at 11:02

## 2018-07-26 RX ADMIN — SODIUM CHLORIDE 250 ML: 9 INJECTION, SOLUTION INTRAVENOUS at 11:01

## 2018-07-26 RX ADMIN — ERYTHROPOIETIN 40000 UNITS: 40000 INJECTION, SOLUTION INTRAVENOUS; SUBCUTANEOUS at 11:14

## 2018-07-30 LAB
CYTO UR: NORMAL
LAB AP CASE REPORT: NORMAL
PATH REPORT.FINAL DX SPEC: NORMAL
PATH REPORT.GROSS SPEC: NORMAL

## 2018-08-02 ENCOUNTER — INFUSION (OUTPATIENT)
Dept: ONCOLOGY | Facility: HOSPITAL | Age: 58
End: 2018-08-02
Attending: INTERNAL MEDICINE

## 2018-08-02 VITALS
HEIGHT: 60 IN | TEMPERATURE: 98.1 F | OXYGEN SATURATION: 100 % | DIASTOLIC BLOOD PRESSURE: 59 MMHG | BODY MASS INDEX: 57.52 KG/M2 | RESPIRATION RATE: 18 BRPM | WEIGHT: 293 LBS | HEART RATE: 68 BPM | SYSTOLIC BLOOD PRESSURE: 156 MMHG

## 2018-08-02 DIAGNOSIS — N18.30 ANEMIA IN STAGE 3 CHRONIC KIDNEY DISEASE (HCC): Primary | ICD-10-CM

## 2018-08-02 DIAGNOSIS — D63.1 ANEMIA IN STAGE 3 CHRONIC KIDNEY DISEASE (HCC): Primary | ICD-10-CM

## 2018-08-02 PROCEDURE — 96374 THER/PROPH/DIAG INJ IV PUSH: CPT

## 2018-08-02 PROCEDURE — 25010000002 FERUMOXYTOL 510 MG/17ML SOLUTION 510 MG VIAL: Performed by: INTERNAL MEDICINE

## 2018-08-02 PROCEDURE — 96365 THER/PROPH/DIAG IV INF INIT: CPT

## 2018-08-02 RX ORDER — SODIUM CHLORIDE 9 MG/ML
250 INJECTION, SOLUTION INTRAVENOUS ONCE
Status: COMPLETED | OUTPATIENT
Start: 2018-08-02 | End: 2018-08-02

## 2018-08-02 RX ADMIN — SODIUM CHLORIDE 250 ML: 9 INJECTION, SOLUTION INTRAVENOUS at 10:02

## 2018-08-02 RX ADMIN — FERUMOXYTOL 510 MG: 510 INJECTION INTRAVENOUS at 10:03

## 2018-08-07 ENCOUNTER — OFFICE VISIT (OUTPATIENT)
Dept: OBSTETRICS AND GYNECOLOGY | Facility: CLINIC | Age: 58
End: 2018-08-07

## 2018-08-07 ENCOUNTER — TELEPHONE (OUTPATIENT)
Dept: OBSTETRICS AND GYNECOLOGY | Facility: CLINIC | Age: 58
End: 2018-08-07

## 2018-08-07 VITALS
SYSTOLIC BLOOD PRESSURE: 134 MMHG | HEIGHT: 60 IN | DIASTOLIC BLOOD PRESSURE: 64 MMHG | WEIGHT: 293 LBS | BODY MASS INDEX: 57.52 KG/M2

## 2018-08-07 DIAGNOSIS — E11.22 TYPE 2 DIABETES MELLITUS WITH STAGE 3 CHRONIC KIDNEY DISEASE, WITH LONG-TERM CURRENT USE OF INSULIN (HCC): ICD-10-CM

## 2018-08-07 DIAGNOSIS — N18.30 TYPE 2 DIABETES MELLITUS WITH STAGE 3 CHRONIC KIDNEY DISEASE, WITH LONG-TERM CURRENT USE OF INSULIN (HCC): ICD-10-CM

## 2018-08-07 DIAGNOSIS — N95.0 POSTMENOPAUSAL BLEEDING: Primary | ICD-10-CM

## 2018-08-07 DIAGNOSIS — Z79.4 TYPE 2 DIABETES MELLITUS WITH STAGE 3 CHRONIC KIDNEY DISEASE, WITH LONG-TERM CURRENT USE OF INSULIN (HCC): ICD-10-CM

## 2018-08-07 PROCEDURE — 99213 OFFICE O/P EST LOW 20 MIN: CPT | Performed by: OBSTETRICS & GYNECOLOGY

## 2018-08-07 NOTE — TELEPHONE ENCOUNTER
Left message with patient letting her know her appt date, time, and location for referral to Dr. Gutpa in [place], [place]. Patient called back and confirmed appt date and time.      APPT INFO:    08/09/18 at 11:15 am.     Dr. Juno Gupta  [address]  [place] [address]  PH: [phone]  FAX: [phone]      Faxed records to [phone]

## 2018-08-09 DIAGNOSIS — N95.0 POSTMENOPAUSAL BLEEDING: ICD-10-CM

## 2018-08-09 RX ORDER — MEDROXYPROGESTERONE ACETATE 10 MG/1
TABLET ORAL
Qty: 15 TABLET | Refills: 0 | OUTPATIENT
Start: 2018-08-09

## 2018-08-09 NOTE — PROGRESS NOTES
"Maria C Shrestha is a 58 y.o. female here for follow-up of postmenopausal bleeding.  She underwent hysteroscopy with D&C on July 25 for persistent postmenopausal bleeding despite a previous benign endometrial biopsy.  She has not responded to progestin therapy and has continued to bleed resulting in anemia with a hemoglobin under 8.  She has received a couple of iron infusions.  Since her procedure, she reports that she has had spotting with only one day of mildly increased bleeding.  She continues to take norethindrone 5 mg daily.  She has type II diabetes, morbid obesity, and kidney disease which make her a poor surgical candidate.    Visit Vitals  /64 (BP Location: Left arm, Patient Position: Sitting)   Ht 152.4 cm (60\")   Wt (!) 185 kg (407 lb)   LMP  (LMP Unknown)   BMI 79.49 kg/m²     Pleasant female no acute distress  Breathing unlabored  Skin without pallor    We reviewed the pathology report from her D&C showing \"benign endometrium with glandular and stromal breakdown\"    Assessment: Postmenopausal bleeding    She has persistent postmenopausal bleeding despite hormonal therapy.  Her ultrasound and pathology results are unremarkable.  We discuss further treatment options today including using a Mirena IUD versus hysterectomy.  She is really frustrated by continued bleeding despite hormonal therapy and would like to consider hysterectomy as definitive therapy.  Given her BMI and medical comorbidities, I have decided to refer her to Gyn oncology for surgical evaluation.  She may return here as needed afterward.    "

## 2018-08-09 NOTE — TELEPHONE ENCOUNTER
Please review rx request. Did not know if she needed to continue since she was referred elsewhere.

## 2018-08-10 RX ORDER — MEDROXYPROGESTERONE ACETATE 10 MG/1
TABLET ORAL
Qty: 15 TABLET | Refills: 0 | OUTPATIENT
Start: 2018-08-10

## 2018-08-22 ENCOUNTER — OFFICE VISIT (OUTPATIENT)
Dept: ONCOLOGY | Facility: CLINIC | Age: 58
End: 2018-08-22

## 2018-08-22 ENCOUNTER — LAB (OUTPATIENT)
Dept: LAB | Facility: HOSPITAL | Age: 58
End: 2018-08-22
Attending: INTERNAL MEDICINE

## 2018-08-22 ENCOUNTER — INFUSION (OUTPATIENT)
Dept: ONCOLOGY | Facility: HOSPITAL | Age: 58
End: 2018-08-22
Attending: INTERNAL MEDICINE

## 2018-08-22 VITALS
SYSTOLIC BLOOD PRESSURE: 150 MMHG | RESPIRATION RATE: 18 BRPM | HEIGHT: 60 IN | DIASTOLIC BLOOD PRESSURE: 76 MMHG | OXYGEN SATURATION: 96 % | WEIGHT: 293 LBS | BODY MASS INDEX: 57.52 KG/M2 | HEART RATE: 76 BPM | TEMPERATURE: 98.1 F

## 2018-08-22 VITALS
OXYGEN SATURATION: 98 % | SYSTOLIC BLOOD PRESSURE: 168 MMHG | WEIGHT: 293 LBS | BODY MASS INDEX: 57.52 KG/M2 | HEIGHT: 60 IN | DIASTOLIC BLOOD PRESSURE: 55 MMHG | TEMPERATURE: 97.9 F | RESPIRATION RATE: 18 BRPM | HEART RATE: 71 BPM

## 2018-08-22 DIAGNOSIS — D63.1 ANEMIA IN STAGE 3 CHRONIC KIDNEY DISEASE (HCC): ICD-10-CM

## 2018-08-22 DIAGNOSIS — E11.22 TYPE 2 DIABETES MELLITUS WITH STAGE 3 CHRONIC KIDNEY DISEASE, WITH LONG-TERM CURRENT USE OF INSULIN (HCC): Primary | ICD-10-CM

## 2018-08-22 DIAGNOSIS — N18.30 ANEMIA IN STAGE 3 CHRONIC KIDNEY DISEASE (HCC): ICD-10-CM

## 2018-08-22 DIAGNOSIS — N18.30 TYPE 2 DIABETES MELLITUS WITH STAGE 3 CHRONIC KIDNEY DISEASE, WITH LONG-TERM CURRENT USE OF INSULIN (HCC): Primary | ICD-10-CM

## 2018-08-22 DIAGNOSIS — Z79.4 TYPE 2 DIABETES MELLITUS WITH STAGE 3 CHRONIC KIDNEY DISEASE, WITH LONG-TERM CURRENT USE OF INSULIN (HCC): Primary | ICD-10-CM

## 2018-08-22 DIAGNOSIS — D63.1 ANEMIA IN STAGE 3 CHRONIC KIDNEY DISEASE (HCC): Primary | ICD-10-CM

## 2018-08-22 DIAGNOSIS — N18.30 ANEMIA IN STAGE 3 CHRONIC KIDNEY DISEASE (HCC): Primary | ICD-10-CM

## 2018-08-22 LAB
ALBUMIN SERPL-MCNC: 3.8 G/DL (ref 3.5–5)
ALBUMIN/GLOB SERPL: 1.2 G/DL (ref 1.1–2.5)
ALP SERPL-CCNC: 51 U/L (ref 24–120)
ALT SERPL W P-5'-P-CCNC: 17 U/L (ref 0–54)
ANION GAP SERPL CALCULATED.3IONS-SCNC: 9 MMOL/L (ref 4–13)
AST SERPL-CCNC: 27 U/L (ref 7–45)
BASOPHILS # BLD AUTO: 0.05 10*3/MM3 (ref 0–0.2)
BASOPHILS NFR BLD AUTO: 0.4 % (ref 0–2)
BILIRUB SERPL-MCNC: 0.2 MG/DL (ref 0.1–1)
BUN BLD-MCNC: 29 MG/DL (ref 5–21)
BUN/CREAT SERPL: 15.9 (ref 7–25)
CALCIUM SPEC-SCNC: 8.7 MG/DL (ref 8.4–10.4)
CHLORIDE SERPL-SCNC: 111 MMOL/L (ref 98–110)
CO2 SERPL-SCNC: 22 MMOL/L (ref 24–31)
CREAT BLD-MCNC: 1.82 MG/DL (ref 0.5–1.4)
DEPRECATED RDW RBC AUTO: 60.4 FL (ref 40–54)
EOSINOPHIL # BLD AUTO: 0.28 10*3/MM3 (ref 0–0.7)
EOSINOPHIL NFR BLD AUTO: 2.5 % (ref 0–4)
ERYTHROCYTE [DISTWIDTH] IN BLOOD BY AUTOMATED COUNT: 17.6 % (ref 12–15)
GFR SERPL CREATININE-BSD FRML MDRD: 29 ML/MIN/1.73
GLOBULIN UR ELPH-MCNC: 3.1 GM/DL
GLUCOSE BLD-MCNC: 157 MG/DL (ref 70–100)
HCT VFR BLD AUTO: 28.6 % (ref 37–47)
HGB BLD-MCNC: 9.2 G/DL (ref 12–16)
IMM GRANULOCYTES # BLD: 0.08 10*3/MM3 (ref 0–0.03)
IMM GRANULOCYTES NFR BLD: 0.7 % (ref 0–5)
LYMPHOCYTES # BLD AUTO: 1.54 10*3/MM3 (ref 0.72–4.86)
LYMPHOCYTES NFR BLD AUTO: 13.5 % (ref 15–45)
MCH RBC QN AUTO: 30.2 PG (ref 28–32)
MCHC RBC AUTO-ENTMCNC: 32.2 G/DL (ref 33–36)
MCV RBC AUTO: 93.8 FL (ref 82–98)
MONOCYTES # BLD AUTO: 0.58 10*3/MM3 (ref 0.19–1.3)
MONOCYTES NFR BLD AUTO: 5.1 % (ref 4–12)
NEUTROPHILS # BLD AUTO: 8.86 10*3/MM3 (ref 1.87–8.4)
NEUTROPHILS NFR BLD AUTO: 77.8 % (ref 39–78)
NRBC BLD MANUAL-RTO: 0 /100 WBC (ref 0–0)
PLATELET # BLD AUTO: 238 10*3/MM3 (ref 130–400)
PMV BLD AUTO: 10.3 FL (ref 6–12)
POTASSIUM BLD-SCNC: 4.2 MMOL/L (ref 3.5–5.3)
PROT SERPL-MCNC: 6.9 G/DL (ref 6.3–8.7)
RBC # BLD AUTO: 3.05 10*6/MM3 (ref 4.2–5.4)
SODIUM BLD-SCNC: 142 MMOL/L (ref 135–145)
WBC NRBC COR # BLD: 11.39 10*3/MM3 (ref 4.8–10.8)

## 2018-08-22 PROCEDURE — 36415 COLL VENOUS BLD VENIPUNCTURE: CPT

## 2018-08-22 PROCEDURE — 85025 COMPLETE CBC W/AUTO DIFF WBC: CPT

## 2018-08-22 PROCEDURE — 80053 COMPREHEN METABOLIC PANEL: CPT

## 2018-08-22 PROCEDURE — 96372 THER/PROPH/DIAG INJ SC/IM: CPT

## 2018-08-22 PROCEDURE — 99214 OFFICE O/P EST MOD 30 MIN: CPT | Performed by: INTERNAL MEDICINE

## 2018-08-22 PROCEDURE — 63510000001 EPOETIN ALFA PER 1000 UNITS: Performed by: INTERNAL MEDICINE

## 2018-08-22 RX ORDER — IBUPROFEN 800 MG/1
800 TABLET ORAL EVERY 8 HOURS PRN
COMMUNITY
End: 2020-12-21

## 2018-08-22 RX ADMIN — ERYTHROPOIETIN 40000 UNITS: 40000 INJECTION, SOLUTION INTRAVENOUS; SUBCUTANEOUS at 14:23

## 2018-08-22 NOTE — PROGRESS NOTES
[facility]  HEMATOLOGY & ONCOLOGY        Subjective     VISIT DIAGNOSIS:   Encounter Diagnosis   Name Primary?   • Anemia in stage 3 chronic kidney disease        REASON FOR VISIT:     No chief complaint on file.       HEMATOLOGY / ONCOLOGY HISTORY:Ms. Shrestha is a 55 year old female with past medical history of hypertension, chronic kidney disease, diabetes insulindependent  type 2   No history exists.     [No treatment plan]  Cancer Staging Information:  No matching staging information was found for the patient.      INTERVAL HISTORY  Patient ID: Maria C Shrestha is a 58 y.o. year old female   Anemia secondary to chronic kidney disease, Stage III. Patient has not required VALERIE therapy this far.  2. Iron deficiency in the setting of chronic kidney disease, has benefitted from Feraheme. Had Feraheme in January, hemoglobin has  now near normalized at 10.7        Review of Systems   Constitutional: Negative.    HENT: Negative.    Eyes: Negative.    Respiratory: Negative.    Cardiovascular: Negative.    Gastrointestinal: Negative.    Endocrine: Negative.    Genitourinary: Negative.    Musculoskeletal: Negative.    Skin: Negative.    Allergic/Immunologic: Negative.    Neurological: Negative.    Hematological: Negative.    Psychiatric/Behavioral: Negative.             Medications:    Current Outpatient Prescriptions   Medication Sig Dispense Refill   • allopurinol (ZYLOPRIM) 100 MG tablet Take 100 mg by mouth 2 (Two) Times a Day.     • amLODIPine (NORVASC) 5 MG tablet      • aspirin 81 MG tablet Take 81 mg by mouth Daily. Stop 7/22/2018     • busPIRone (BUSPAR) 10 MG tablet buspirone 10 mg tablet   Take 2 tablets twice a day by oral route as needed for 90 days.     • carvedilol (COREG) 3.125 MG tablet Take 3.125 mg by mouth 2 (Two) Times a Day.     • cetirizine (zyrTEC) 10 MG tablet Take 10 mg by mouth As Needed.     • Cholecalciferol (VITAMIN D3) 86142 units tablet Vitamin D2 50,000 unit capsule   Take 1  "capsule every week by oral route. Sunday     • cinacalcet (SENSIPAR) 30 MG tablet Take 1 tablet by mouth Daily. 30 tablet 11   • citalopram (CeleXA) 20 MG tablet Take 20 mg by mouth Daily.     • diazePAM (VALIUM) 2 MG tablet Take 2 mg by mouth As Needed for Anxiety.     • fluticasone (VERAMYST) 27.5 MCG/SPRAY nasal spray 2 sprays into each nostril Daily.     • furosemide (LASIX) 40 MG tablet Take 1 tablet by mouth Daily. 90 tablet 3   • Glucose Blood (BLOOD GLUCOSE TEST) strip Use 4 x daily, use any brand covered by insurance or same brand as before 360 each 3   • HUMULIN R 500 UNIT/ML CONCENTRATED injection USING A U100 SYRINGE DRAW UP TO THE DIRECTED UNIT ROXI AND INJECT FOUR TIMES A DAY (UP TO 30 UNIT ROXI FOUR TIMES A DAY) (Patient taking differently: USING A U100 SYRINGE DRAW UP TO THE DIRECTED UNIT ROXI AND INJECT FOUR TIMES A DAY (UP TO 30 UNIT ROXI FOUR TIMES A DAY)  6 units with meals) 100 mL 3   • insulin glargine (LANTUS) 100 UNIT/ML injection 200 units daily (Patient taking differently: Inject 180 Units under the skin Every Night. 200 units daily) 18 each 3   • Insulin Syringe 31G X 5/16\" 1 ML misc 4 x daily 120 each 11   • lamoTRIgine (LaMICtal) 50 MG tablet dispersible disintegrating tablet Take 50 mg by mouth Every Night.     • Lancets (FREESTYLE) lancets FOUR TIMES A  each 11   • levothyroxine (SYNTHROID, LEVOTHROID) 50 MCG tablet Take 1 tablet by mouth Daily. 90 tablet 1   • lisinopril (PRINIVIL,ZESTRIL) 20 MG tablet Take 20 mg by mouth 2 (Two) Times a Day.     • Multiple Vitamins-Minerals (MULTIVITAMIN ADULT PO) Take 1 tablet by mouth Daily.     • norethindrone (AYGESTIN) 5 MG tablet Take 1 tablet by mouth Daily. 30 tablet 1   • potassium gluconate 595 (99 K) MG tablet tablet potassium gluconate 595 mg (99 mg) tablet   Take 2 tablets every day by oral route.     • rosuvastatin (CRESTOR) 10 MG tablet Take 10 mg by mouth Daily.     • sodium bicarbonate 650 MG tablet Take 650 mg by mouth 3 " (Three) Times a Day.     • traMADol (ULTRAM) 50 MG tablet Take 1 tablet by mouth Every 6 (Six) Hours As Needed for Moderate Pain . 8 tablet 0   • TRULICITY 1.5 MG/0.5ML solution pen-injector INJECT 1.5 MG UNDER THE SKIN EVERY 7 DAYS (Patient taking differently: INJECT 1.5 MG UNDER THE SKIN EVERY 7 DAYS Sunday) 6 mL 3     No current facility-administered medications for this visit.        ALLERGIES:    Allergies   Allergen Reactions   • Codeine Nausea Only       Objective      @VITALS    Current Status 7/24/2018   ECOG score 1       General Appearance: Patient is awake, alert, oriented and in no acute distress. Patient is welldeveloped, wellnourished, and appears stated age.  HEENT: Normocephalic. Sclerae clear, conjunctiva pink, extraocular movements intact, pupils, round, reactive to light and  accommodation. Mouth and throat are clear with moist oral mucosa.  NECK: Supple, no jugular venous distention, thyroid not enlarged.  LYMPH: No cervical, supraclavicular, axillary, or inguinal lymphadenopathy.  CHEST: Equal bilateral expansion, AP  diameter normal, resonant percussion note  LUNGS: Good air movement, no rales, rhonchi, rubs or wheezes with auscultation  CARDIO: Regular sinus rhythm, no murmurs, gallops or rubs.  ABDOMEN: Nondistended, soft, No tenderness, no guarding, no rebound, No hepatosplenomegaly. No abdominal masses. Bowel sounds positive. No hernia  GENITALIA: Not examined.  BREASTS: Not examined.  MUSKEL: No joint swelling, decreased motion, or inflammation  EXTREMS: No edema, clubbing, cyanosis, No varicose veins.  NEURO: Grossly nonfocal, Gait is coordinated and smooth, Cognition is preserved.  SKIN: No rashes, no ecchymoses, no petechia.  PSYCH: Oriented to time, place and person. Memory is preserved. Mood and affect appear normal      RECENT LABS:  Lab on 08/22/2018   Component Date Value Ref Range Status   • Glucose 08/22/2018 157* 70 - 100 mg/dL Final   • BUN 08/22/2018 29* 5 - 21 mg/dL Final    • Creatinine 08/22/2018 1.82* 0.50 - 1.40 mg/dL Final   • Sodium 08/22/2018 142  135 - 145 mmol/L Final   • Potassium 08/22/2018 4.2  3.5 - 5.3 mmol/L Final   • Chloride 08/22/2018 111* 98 - 110 mmol/L Final   • CO2 08/22/2018 22.0* 24.0 - 31.0 mmol/L Final   • Calcium 08/22/2018 8.7  8.4 - 10.4 mg/dL Final   • Total Protein 08/22/2018 6.9  6.3 - 8.7 g/dL Final   • Albumin 08/22/2018 3.80  3.50 - 5.00 g/dL Final   • ALT (SGPT) 08/22/2018 17  0 - 54 U/L Final   • AST (SGOT) 08/22/2018 27  7 - 45 U/L Final   • Alkaline Phosphatase 08/22/2018 51  24 - 120 U/L Final   • Total Bilirubin 08/22/2018 0.2  0.1 - 1.0 mg/dL Final   • eGFR Non African Amer 08/22/2018 29* >60 mL/min/1.73 Final   • Globulin 08/22/2018 3.1  gm/dL Final   • A/G Ratio 08/22/2018 1.2  1.1 - 2.5 g/dL Final   • BUN/Creatinine Ratio 08/22/2018 15.9  7.0 - 25.0 Final   • Anion Gap 08/22/2018 9.0  4.0 - 13.0 mmol/L Final   • WBC 08/22/2018 11.39* 4.80 - 10.80 10*3/mm3 Final   • RBC 08/22/2018 3.05* 4.20 - 5.40 10*6/mm3 Final   • Hemoglobin 08/22/2018 9.2* 12.0 - 16.0 g/dL Final   • Hematocrit 08/22/2018 28.6* 37.0 - 47.0 % Final   • MCV 08/22/2018 93.8  82.0 - 98.0 fL Final   • MCH 08/22/2018 30.2  28.0 - 32.0 pg Final   • MCHC 08/22/2018 32.2* 33.0 - 36.0 g/dL Final   • RDW 08/22/2018 17.6* 12.0 - 15.0 % Final   • RDW-SD 08/22/2018 60.4* 40.0 - 54.0 fl Final   • MPV 08/22/2018 10.3  6.0 - 12.0 fL Final   • Platelets 08/22/2018 238  130 - 400 10*3/mm3 Final   • Neutrophil % 08/22/2018 77.8  39.0 - 78.0 % Final   • Lymphocyte % 08/22/2018 13.5* 15.0 - 45.0 % Final   • Monocyte % 08/22/2018 5.1  4.0 - 12.0 % Final   • Eosinophil % 08/22/2018 2.5  0.0 - 4.0 % Final   • Basophil % 08/22/2018 0.4  0.0 - 2.0 % Final   • Immature Grans % 08/22/2018 0.7  0.0 - 5.0 % Final   • Neutrophils, Absolute 08/22/2018 8.86* 1.87 - 8.40 10*3/mm3 Final   • Lymphocytes, Absolute 08/22/2018 1.54  0.72 - 4.86 10*3/mm3 Final   • Monocytes, Absolute 08/22/2018 0.58  0.19 - 1.30  10*3/mm3 Final   • Eosinophils, Absolute 08/22/2018 0.28  0.00 - 0.70 10*3/mm3 Final   • Basophils, Absolute 08/22/2018 0.05  0.00 - 0.20 10*3/mm3 Final   • Immature Grans, Absolute 08/22/2018 0.08* 0.00 - 0.03 10*3/mm3 Final   • nRBC 08/22/2018 0.0  0.0 - 0.0 /100 WBC Final       RADIOLOGY:  No results found.         Assessment/Plan        Anemia secondary to chronic kidney disease, Stage III. Patient has not required VALERIE therapy this far.  2. Iron deficiency in the setting of chronic kidney disease, has benefitted from Feraheme. Had Feraheme in January, hemoglobin has  now near normalized at 10.3.  Plan continue watchful waiting return to clinic in 3 months time and check another CBC and iron studies.  If the iron is still low I may give additional intravenous iron.  Her CMS guidelines I cannot give her Procrit unless hemoglobin is less than 10.    Clinically patient asymptomatic.  I will redraw her iron studies today.  Hemoglobin is a 10.  Return to clinic in 3 months time.            Marco A Melo MD    8/22/2018    1:32 PM             [facility]  HEMATOLOGY & ONCOLOGY        Subjective     VISIT DIAGNOSIS:   Encounter Diagnosis   Name Primary?   • Anemia in stage 3 chronic kidney disease        REASON FOR VISIT:     No chief complaint on file.       HEMATOLOGY / ONCOLOGY HISTORY:Ms. Shrestha is a 55 year old female with past medical history of hypertension, chronic kidney disease, diabetes insulindependent  type 2   No history exists.     [No treatment plan]  Cancer Staging Information:  No matching staging information was found for the patient.      INTERVAL HISTORY  Patient ID: Maria C Shrestha is a 58 y.o. year old female   Anemia secondary to chronic kidney disease, Stage III. Patient has not required VALERIE therapy this far.  2. Iron deficiency in the setting of chronic kidney disease, has benefitted from Feraheme. Had Feraheme in January, hemoglobin has  now near normalized at 10.7        Review  of Systems   Constitutional: Negative.    HENT: Negative.    Eyes: Negative.    Respiratory: Negative.    Cardiovascular: Negative.    Gastrointestinal: Negative.    Endocrine: Negative.    Genitourinary: Negative.    Musculoskeletal: Negative.    Skin: Negative.    Allergic/Immunologic: Negative.    Neurological: Negative.    Hematological: Negative.    Psychiatric/Behavioral: Negative.             Medications:    Current Outpatient Prescriptions   Medication Sig Dispense Refill   • allopurinol (ZYLOPRIM) 100 MG tablet Take 100 mg by mouth 2 (Two) Times a Day.     • amLODIPine (NORVASC) 5 MG tablet      • aspirin 81 MG tablet Take 81 mg by mouth Daily. Stop 7/22/2018     • busPIRone (BUSPAR) 10 MG tablet buspirone 10 mg tablet   Take 2 tablets twice a day by oral route as needed for 90 days.     • carvedilol (COREG) 3.125 MG tablet Take 3.125 mg by mouth 2 (Two) Times a Day.     • cetirizine (zyrTEC) 10 MG tablet Take 10 mg by mouth As Needed.     • Cholecalciferol (VITAMIN D3) 62349 units tablet Vitamin D2 50,000 unit capsule   Take 1 capsule every week by oral route. Sunday     • cinacalcet (SENSIPAR) 30 MG tablet Take 1 tablet by mouth Daily. 30 tablet 11   • citalopram (CeleXA) 20 MG tablet Take 20 mg by mouth Daily.     • diazePAM (VALIUM) 2 MG tablet Take 2 mg by mouth As Needed for Anxiety.     • fluticasone (VERAMYST) 27.5 MCG/SPRAY nasal spray 2 sprays into each nostril Daily.     • furosemide (LASIX) 40 MG tablet Take 1 tablet by mouth Daily. 90 tablet 3   • Glucose Blood (BLOOD GLUCOSE TEST) strip Use 4 x daily, use any brand covered by insurance or same brand as before 360 each 3   • HUMULIN R 500 UNIT/ML CONCENTRATED injection USING A U100 SYRINGE DRAW UP TO THE DIRECTED UNIT ROXI AND INJECT FOUR TIMES A DAY (UP TO 30 UNIT ROXI FOUR TIMES A DAY) (Patient taking differently: USING A U100 SYRINGE DRAW UP TO THE DIRECTED UNIT ROXI AND INJECT FOUR TIMES A DAY (UP TO 30 UNIT ROXI FOUR TIMES A DAY)  6 units  "with meals) 100 mL 3   • insulin glargine (LANTUS) 100 UNIT/ML injection 200 units daily (Patient taking differently: Inject 180 Units under the skin Every Night. 200 units daily) 18 each 3   • Insulin Syringe 31G X 5/16\" 1 ML misc 4 x daily 120 each 11   • lamoTRIgine (LaMICtal) 50 MG tablet dispersible disintegrating tablet Take 50 mg by mouth Every Night.     • Lancets (FREESTYLE) lancets FOUR TIMES A  each 11   • levothyroxine (SYNTHROID, LEVOTHROID) 50 MCG tablet Take 1 tablet by mouth Daily. 90 tablet 1   • lisinopril (PRINIVIL,ZESTRIL) 20 MG tablet Take 20 mg by mouth 2 (Two) Times a Day.     • Multiple Vitamins-Minerals (MULTIVITAMIN ADULT PO) Take 1 tablet by mouth Daily.     • norethindrone (AYGESTIN) 5 MG tablet Take 1 tablet by mouth Daily. 30 tablet 1   • potassium gluconate 595 (99 K) MG tablet tablet potassium gluconate 595 mg (99 mg) tablet   Take 2 tablets every day by oral route.     • rosuvastatin (CRESTOR) 10 MG tablet Take 10 mg by mouth Daily.     • sodium bicarbonate 650 MG tablet Take 650 mg by mouth 3 (Three) Times a Day.     • traMADol (ULTRAM) 50 MG tablet Take 1 tablet by mouth Every 6 (Six) Hours As Needed for Moderate Pain . 8 tablet 0   • TRULICITY 1.5 MG/0.5ML solution pen-injector INJECT 1.5 MG UNDER THE SKIN EVERY 7 DAYS (Patient taking differently: INJECT 1.5 MG UNDER THE SKIN EVERY 7 DAYS Sunday) 6 mL 3     No current facility-administered medications for this visit.        ALLERGIES:    Allergies   Allergen Reactions   • Codeine Nausea Only       Objective      @VITALS    Current Status 7/24/2018   ECOG score 1       General Appearance: Patient is awake, alert, oriented and in no acute distress. Patient is welldeveloped, wellnourished, and appears stated age.  HEENT: Normocephalic. Sclerae clear, conjunctiva pink, extraocular movements intact, pupils, round, reactive to light and  accommodation. Mouth and throat are clear with moist oral mucosa.  NECK: Supple, no jugular " venous distention, thyroid not enlarged.  LYMPH: No cervical, supraclavicular, axillary, or inguinal lymphadenopathy.  CHEST: Equal bilateral expansion, AP  diameter normal, resonant percussion note  LUNGS: Good air movement, no rales, rhonchi, rubs or wheezes with auscultation  CARDIO: Regular sinus rhythm, no murmurs, gallops or rubs.  ABDOMEN: Nondistended, soft, No tenderness, no guarding, no rebound, No hepatosplenomegaly. No abdominal masses. Bowel sounds positive. No hernia  GENITALIA: Not examined.  BREASTS: Not examined.  MUSKEL: No joint swelling, decreased motion, or inflammation  EXTREMS: No edema, clubbing, cyanosis, No varicose veins.  NEURO: Grossly nonfocal, Gait is coordinated and smooth, Cognition is preserved.  SKIN: No rashes, no ecchymoses, no petechia.  PSYCH: Oriented to time, place and person. Memory is preserved. Mood and affect appear normal      RECENT LABS:  Lab on 08/22/2018   Component Date Value Ref Range Status   • Glucose 08/22/2018 157* 70 - 100 mg/dL Final   • BUN 08/22/2018 29* 5 - 21 mg/dL Final   • Creatinine 08/22/2018 1.82* 0.50 - 1.40 mg/dL Final   • Sodium 08/22/2018 142  135 - 145 mmol/L Final   • Potassium 08/22/2018 4.2  3.5 - 5.3 mmol/L Final   • Chloride 08/22/2018 111* 98 - 110 mmol/L Final   • CO2 08/22/2018 22.0* 24.0 - 31.0 mmol/L Final   • Calcium 08/22/2018 8.7  8.4 - 10.4 mg/dL Final   • Total Protein 08/22/2018 6.9  6.3 - 8.7 g/dL Final   • Albumin 08/22/2018 3.80  3.50 - 5.00 g/dL Final   • ALT (SGPT) 08/22/2018 17  0 - 54 U/L Final   • AST (SGOT) 08/22/2018 27  7 - 45 U/L Final   • Alkaline Phosphatase 08/22/2018 51  24 - 120 U/L Final   • Total Bilirubin 08/22/2018 0.2  0.1 - 1.0 mg/dL Final   • eGFR Non African Amer 08/22/2018 29* >60 mL/min/1.73 Final   • Globulin 08/22/2018 3.1  gm/dL Final   • A/G Ratio 08/22/2018 1.2  1.1 - 2.5 g/dL Final   • BUN/Creatinine Ratio 08/22/2018 15.9  7.0 - 25.0 Final   • Anion Gap 08/22/2018 9.0  4.0 - 13.0 mmol/L Final   •  WBC 08/22/2018 11.39* 4.80 - 10.80 10*3/mm3 Final   • RBC 08/22/2018 3.05* 4.20 - 5.40 10*6/mm3 Final   • Hemoglobin 08/22/2018 9.2* 12.0 - 16.0 g/dL Final   • Hematocrit 08/22/2018 28.6* 37.0 - 47.0 % Final   • MCV 08/22/2018 93.8  82.0 - 98.0 fL Final   • MCH 08/22/2018 30.2  28.0 - 32.0 pg Final   • MCHC 08/22/2018 32.2* 33.0 - 36.0 g/dL Final   • RDW 08/22/2018 17.6* 12.0 - 15.0 % Final   • RDW-SD 08/22/2018 60.4* 40.0 - 54.0 fl Final   • MPV 08/22/2018 10.3  6.0 - 12.0 fL Final   • Platelets 08/22/2018 238  130 - 400 10*3/mm3 Final   • Neutrophil % 08/22/2018 77.8  39.0 - 78.0 % Final   • Lymphocyte % 08/22/2018 13.5* 15.0 - 45.0 % Final   • Monocyte % 08/22/2018 5.1  4.0 - 12.0 % Final   • Eosinophil % 08/22/2018 2.5  0.0 - 4.0 % Final   • Basophil % 08/22/2018 0.4  0.0 - 2.0 % Final   • Immature Grans % 08/22/2018 0.7  0.0 - 5.0 % Final   • Neutrophils, Absolute 08/22/2018 8.86* 1.87 - 8.40 10*3/mm3 Final   • Lymphocytes, Absolute 08/22/2018 1.54  0.72 - 4.86 10*3/mm3 Final   • Monocytes, Absolute 08/22/2018 0.58  0.19 - 1.30 10*3/mm3 Final   • Eosinophils, Absolute 08/22/2018 0.28  0.00 - 0.70 10*3/mm3 Final   • Basophils, Absolute 08/22/2018 0.05  0.00 - 0.20 10*3/mm3 Final   • Immature Grans, Absolute 08/22/2018 0.08* 0.00 - 0.03 10*3/mm3 Final   • nRBC 08/22/2018 0.0  0.0 - 0.0 /100 WBC Final       RADIOLOGY:  No results found.         Assessment/Plan        Anemia secondary to chronic kidney disease, Stage III. Patient has not required VALERIE therapy this far.  2. Iron deficiency in the setting of chronic kidney disease, has benefitted from Feraheme. Had Feraheme in January, hemoglobin has  now near normalized at 10.2.  Plan continue watchful waiting return to clinic in 3 months time and check another CBC and iron studies.  If the iron is still low I may give additional intravenous iron.  .    Clinically patient asymptomatic.  I will redraw her iron studies today.  Hemoglobin is a 10.3gm Today. Per CMS  guidelines Rx Procrit 40,000 u s/q every month.       Marco A Melo MD    8/22/2018    1:32 PM             [facility]  HEMATOLOGY & ONCOLOGY        Subjective     VISIT DIAGNOSIS:   Encounter Diagnosis   Name Primary?   • Anemia in stage 3 chronic kidney disease        REASON FOR VISIT:     No chief complaint on file.       HEMATOLOGY / ONCOLOGY HISTORY:Ms. Shrestha is a 55 year old female with past medical history of hypertension, chronic kidney disease, diabetes insulindependent  type 2   No history exists.     [No treatment plan]  Cancer Staging Information:  No matching staging information was found for the patient.      INTERVAL HISTORY  Patient ID: Maria C Shrestha is a 58 y.o. year old female   Anemia secondary to chronic kidney disease, Stage III. Patient has not required VALERIE therapy this far.  2. Iron deficiency in the setting of chronic kidney disease, has benefitted from Feraheme. Had Feraheme in January, hemoglobin has  now near normalized at 10.7        Review of Systems   Constitutional: Negative.    HENT: Negative.    Eyes: Negative.    Respiratory: Negative.    Cardiovascular: Negative.    Gastrointestinal: Negative.    Endocrine: Negative.    Genitourinary: Negative.    Musculoskeletal: Negative.    Skin: Negative.    Allergic/Immunologic: Negative.    Neurological: Negative.    Hematological: Negative.    Psychiatric/Behavioral: Negative.             Medications:    Current Outpatient Prescriptions   Medication Sig Dispense Refill   • allopurinol (ZYLOPRIM) 100 MG tablet Take 100 mg by mouth 2 (Two) Times a Day.     • amLODIPine (NORVASC) 5 MG tablet      • aspirin 81 MG tablet Take 81 mg by mouth Daily. Stop 7/22/2018     • busPIRone (BUSPAR) 10 MG tablet buspirone 10 mg tablet   Take 2 tablets twice a day by oral route as needed for 90 days.     • carvedilol (COREG) 3.125 MG tablet Take 3.125 mg by mouth 2 (Two) Times a Day.     • cetirizine (zyrTEC) 10 MG tablet Take 10 mg by mouth  "As Needed.     • Cholecalciferol (VITAMIN D3) 26055 units tablet Vitamin D2 50,000 unit capsule   Take 1 capsule every week by oral route. Sunday     • cinacalcet (SENSIPAR) 30 MG tablet Take 1 tablet by mouth Daily. 30 tablet 11   • citalopram (CeleXA) 20 MG tablet Take 20 mg by mouth Daily.     • diazePAM (VALIUM) 2 MG tablet Take 2 mg by mouth As Needed for Anxiety.     • fluticasone (VERAMYST) 27.5 MCG/SPRAY nasal spray 2 sprays into each nostril Daily.     • furosemide (LASIX) 40 MG tablet Take 1 tablet by mouth Daily. 90 tablet 3   • Glucose Blood (BLOOD GLUCOSE TEST) strip Use 4 x daily, use any brand covered by insurance or same brand as before 360 each 3   • HUMULIN R 500 UNIT/ML CONCENTRATED injection USING A U100 SYRINGE DRAW UP TO THE DIRECTED UNIT ROXI AND INJECT FOUR TIMES A DAY (UP TO 30 UNIT ROXI FOUR TIMES A DAY) (Patient taking differently: USING A U100 SYRINGE DRAW UP TO THE DIRECTED UNIT ROXI AND INJECT FOUR TIMES A DAY (UP TO 30 UNIT ROXI FOUR TIMES A DAY)  6 units with meals) 100 mL 3   • insulin glargine (LANTUS) 100 UNIT/ML injection 200 units daily (Patient taking differently: Inject 180 Units under the skin Every Night. 200 units daily) 18 each 3   • Insulin Syringe 31G X 5/16\" 1 ML misc 4 x daily 120 each 11   • lamoTRIgine (LaMICtal) 50 MG tablet dispersible disintegrating tablet Take 50 mg by mouth Every Night.     • Lancets (FREESTYLE) lancets FOUR TIMES A  each 11   • levothyroxine (SYNTHROID, LEVOTHROID) 50 MCG tablet Take 1 tablet by mouth Daily. 90 tablet 1   • lisinopril (PRINIVIL,ZESTRIL) 20 MG tablet Take 20 mg by mouth 2 (Two) Times a Day.     • Multiple Vitamins-Minerals (MULTIVITAMIN ADULT PO) Take 1 tablet by mouth Daily.     • norethindrone (AYGESTIN) 5 MG tablet Take 1 tablet by mouth Daily. 30 tablet 1   • potassium gluconate 595 (99 K) MG tablet tablet potassium gluconate 595 mg (99 mg) tablet   Take 2 tablets every day by oral route.     • rosuvastatin (CRESTOR) " 10 MG tablet Take 10 mg by mouth Daily.     • sodium bicarbonate 650 MG tablet Take 650 mg by mouth 3 (Three) Times a Day.     • traMADol (ULTRAM) 50 MG tablet Take 1 tablet by mouth Every 6 (Six) Hours As Needed for Moderate Pain . 8 tablet 0   • TRULICITY 1.5 MG/0.5ML solution pen-injector INJECT 1.5 MG UNDER THE SKIN EVERY 7 DAYS (Patient taking differently: INJECT 1.5 MG UNDER THE SKIN EVERY 7 DAYS Sunday) 6 mL 3     No current facility-administered medications for this visit.        ALLERGIES:    Allergies   Allergen Reactions   • Codeine Nausea Only       Objective      @VITALS    Current Status 7/24/2018   ECOG score 1       General Appearance: Patient is awake, alert, oriented and in no acute distress. Patient is welldeveloped, wellnourished, and appears stated age.  HEENT: Normocephalic. Sclerae clear, conjunctiva pink, extraocular movements intact, pupils, round, reactive to light and  accommodation. Mouth and throat are clear with moist oral mucosa.  NECK: Supple, no jugular venous distention, thyroid not enlarged.  LYMPH: No cervical, supraclavicular, axillary, or inguinal lymphadenopathy.  CHEST: Equal bilateral expansion, AP  diameter normal, resonant percussion note  LUNGS: Good air movement, no rales, rhonchi, rubs or wheezes with auscultation  CARDIO: Regular sinus rhythm, no murmurs, gallops or rubs.  ABDOMEN: Nondistended, soft, No tenderness, no guarding, no rebound, No hepatosplenomegaly. No abdominal masses. Bowel sounds positive. No hernia  GENITALIA: Not examined.  BREASTS: Not examined.  MUSKEL: No joint swelling, decreased motion, or inflammation  EXTREMS: No edema, clubbing, cyanosis, No varicose veins.  NEURO: Grossly nonfocal, Gait is coordinated and smooth, Cognition is preserved.  SKIN: No rashes, no ecchymoses, no petechia.  PSYCH: Oriented to time, place and person. Memory is preserved. Mood and affect appear normal      RECENT LABS:  Lab on 08/22/2018   Component Date Value Ref  Range Status   • Glucose 08/22/2018 157* 70 - 100 mg/dL Final   • BUN 08/22/2018 29* 5 - 21 mg/dL Final   • Creatinine 08/22/2018 1.82* 0.50 - 1.40 mg/dL Final   • Sodium 08/22/2018 142  135 - 145 mmol/L Final   • Potassium 08/22/2018 4.2  3.5 - 5.3 mmol/L Final   • Chloride 08/22/2018 111* 98 - 110 mmol/L Final   • CO2 08/22/2018 22.0* 24.0 - 31.0 mmol/L Final   • Calcium 08/22/2018 8.7  8.4 - 10.4 mg/dL Final   • Total Protein 08/22/2018 6.9  6.3 - 8.7 g/dL Final   • Albumin 08/22/2018 3.80  3.50 - 5.00 g/dL Final   • ALT (SGPT) 08/22/2018 17  0 - 54 U/L Final   • AST (SGOT) 08/22/2018 27  7 - 45 U/L Final   • Alkaline Phosphatase 08/22/2018 51  24 - 120 U/L Final   • Total Bilirubin 08/22/2018 0.2  0.1 - 1.0 mg/dL Final   • eGFR Non African Amer 08/22/2018 29* >60 mL/min/1.73 Final   • Globulin 08/22/2018 3.1  gm/dL Final   • A/G Ratio 08/22/2018 1.2  1.1 - 2.5 g/dL Final   • BUN/Creatinine Ratio 08/22/2018 15.9  7.0 - 25.0 Final   • Anion Gap 08/22/2018 9.0  4.0 - 13.0 mmol/L Final   • WBC 08/22/2018 11.39* 4.80 - 10.80 10*3/mm3 Final   • RBC 08/22/2018 3.05* 4.20 - 5.40 10*6/mm3 Final   • Hemoglobin 08/22/2018 9.2* 12.0 - 16.0 g/dL Final   • Hematocrit 08/22/2018 28.6* 37.0 - 47.0 % Final   • MCV 08/22/2018 93.8  82.0 - 98.0 fL Final   • MCH 08/22/2018 30.2  28.0 - 32.0 pg Final   • MCHC 08/22/2018 32.2* 33.0 - 36.0 g/dL Final   • RDW 08/22/2018 17.6* 12.0 - 15.0 % Final   • RDW-SD 08/22/2018 60.4* 40.0 - 54.0 fl Final   • MPV 08/22/2018 10.3  6.0 - 12.0 fL Final   • Platelets 08/22/2018 238  130 - 400 10*3/mm3 Final   • Neutrophil % 08/22/2018 77.8  39.0 - 78.0 % Final   • Lymphocyte % 08/22/2018 13.5* 15.0 - 45.0 % Final   • Monocyte % 08/22/2018 5.1  4.0 - 12.0 % Final   • Eosinophil % 08/22/2018 2.5  0.0 - 4.0 % Final   • Basophil % 08/22/2018 0.4  0.0 - 2.0 % Final   • Immature Grans % 08/22/2018 0.7  0.0 - 5.0 % Final   • Neutrophils, Absolute 08/22/2018 8.86* 1.87 - 8.40 10*3/mm3 Final   • Lymphocytes,  Absolute 08/22/2018 1.54  0.72 - 4.86 10*3/mm3 Final   • Monocytes, Absolute 08/22/2018 0.58  0.19 - 1.30 10*3/mm3 Final   • Eosinophils, Absolute 08/22/2018 0.28  0.00 - 0.70 10*3/mm3 Final   • Basophils, Absolute 08/22/2018 0.05  0.00 - 0.20 10*3/mm3 Final   • Immature Grans, Absolute 08/22/2018 0.08* 0.00 - 0.03 10*3/mm3 Final   • nRBC 08/22/2018 0.0  0.0 - 0.0 /100 WBC Final       RADIOLOGY:  No results found.         Assessment/Plan        Anemia secondary to chronic kidney disease, Stage III. Patient has not required VALERIE therapy this far.  2. Iron deficiency in the setting of chronic kidney disease, has benefitted from Feraheme. Had Feraheme in January, hemoglobin has  now near normalized at 10.3.  Plan continue watchful waiting return to clinic in 3 months time and check another CBC and iron studies.  If the iron is still low I may give additional intravenous iron.  Her CMS guidelines I cannot give her Procrit unless hemoglobin is less than 10.    Clinically patient asymptomatic.  I will redraw her iron studies today.  Hemoglobin is a 10.  Return to clinic in 3 months time.            Marco A Melo MD    8/22/2018    1:32 PM             [facility]  HEMATOLOGY & ONCOLOGY        Subjective     VISIT DIAGNOSIS:   Encounter Diagnosis   Name Primary?   • Anemia in stage 3 chronic kidney disease        REASON FOR VISIT:     No chief complaint on file.       HEMATOLOGY / ONCOLOGY HISTORY:Ms. Shrestha is a 55 year old female with past medical history of hypertension, chronic kidney disease, diabetes insulindependent  type 2   No history exists.     [No treatment plan]  Cancer Staging Information:  No matching staging information was found for the patient.      INTERVAL HISTORY  Patient ID: Maria C Shrestha is a 58 y.o. year old female   Anemia secondary to chronic kidney disease, Stage III. Patient has not required VALERIE therapy this far.  2. Iron deficiency in the setting of chronic kidney disease, has  benefitted from Feraheme. Had Feraheme in January, hemoglobin has  now near normalized at 10.7        Review of Systems   Constitutional: Negative.    HENT: Negative.    Eyes: Negative.    Respiratory: Negative.    Cardiovascular: Negative.    Gastrointestinal: Negative.    Endocrine: Negative.    Genitourinary: Negative.    Musculoskeletal: Negative.    Skin: Negative.    Allergic/Immunologic: Negative.    Neurological: Negative.    Hematological: Negative.    Psychiatric/Behavioral: Negative.             Medications:    Current Outpatient Prescriptions   Medication Sig Dispense Refill   • allopurinol (ZYLOPRIM) 100 MG tablet Take 100 mg by mouth 2 (Two) Times a Day.     • amLODIPine (NORVASC) 5 MG tablet      • aspirin 81 MG tablet Take 81 mg by mouth Daily. Stop 7/22/2018     • busPIRone (BUSPAR) 10 MG tablet buspirone 10 mg tablet   Take 2 tablets twice a day by oral route as needed for 90 days.     • carvedilol (COREG) 3.125 MG tablet Take 3.125 mg by mouth 2 (Two) Times a Day.     • cetirizine (zyrTEC) 10 MG tablet Take 10 mg by mouth As Needed.     • Cholecalciferol (VITAMIN D3) 20610 units tablet Vitamin D2 50,000 unit capsule   Take 1 capsule every week by oral route. Sunday     • cinacalcet (SENSIPAR) 30 MG tablet Take 1 tablet by mouth Daily. 30 tablet 11   • citalopram (CeleXA) 20 MG tablet Take 20 mg by mouth Daily.     • diazePAM (VALIUM) 2 MG tablet Take 2 mg by mouth As Needed for Anxiety.     • fluticasone (VERAMYST) 27.5 MCG/SPRAY nasal spray 2 sprays into each nostril Daily.     • furosemide (LASIX) 40 MG tablet Take 1 tablet by mouth Daily. 90 tablet 3   • Glucose Blood (BLOOD GLUCOSE TEST) strip Use 4 x daily, use any brand covered by insurance or same brand as before 360 each 3   • HUMULIN R 500 UNIT/ML CONCENTRATED injection USING A U100 SYRINGE DRAW UP TO THE DIRECTED UNIT ROXI AND INJECT FOUR TIMES A DAY (UP TO 30 UNIT ROXI FOUR TIMES A DAY) (Patient taking differently: USING A U100 SYRINGE  "DRAW UP TO THE DIRECTED UNIT ROXI AND INJECT FOUR TIMES A DAY (UP TO 30 UNIT ROXI FOUR TIMES A DAY)  6 units with meals) 100 mL 3   • insulin glargine (LANTUS) 100 UNIT/ML injection 200 units daily (Patient taking differently: Inject 180 Units under the skin Every Night. 200 units daily) 18 each 3   • Insulin Syringe 31G X 5/16\" 1 ML misc 4 x daily 120 each 11   • lamoTRIgine (LaMICtal) 50 MG tablet dispersible disintegrating tablet Take 50 mg by mouth Every Night.     • Lancets (FREESTYLE) lancets FOUR TIMES A  each 11   • levothyroxine (SYNTHROID, LEVOTHROID) 50 MCG tablet Take 1 tablet by mouth Daily. 90 tablet 1   • lisinopril (PRINIVIL,ZESTRIL) 20 MG tablet Take 20 mg by mouth 2 (Two) Times a Day.     • Multiple Vitamins-Minerals (MULTIVITAMIN ADULT PO) Take 1 tablet by mouth Daily.     • norethindrone (AYGESTIN) 5 MG tablet Take 1 tablet by mouth Daily. 30 tablet 1   • potassium gluconate 595 (99 K) MG tablet tablet potassium gluconate 595 mg (99 mg) tablet   Take 2 tablets every day by oral route.     • rosuvastatin (CRESTOR) 10 MG tablet Take 10 mg by mouth Daily.     • sodium bicarbonate 650 MG tablet Take 650 mg by mouth 3 (Three) Times a Day.     • traMADol (ULTRAM) 50 MG tablet Take 1 tablet by mouth Every 6 (Six) Hours As Needed for Moderate Pain . 8 tablet 0   • TRULICITY 1.5 MG/0.5ML solution pen-injector INJECT 1.5 MG UNDER THE SKIN EVERY 7 DAYS (Patient taking differently: INJECT 1.5 MG UNDER THE SKIN EVERY 7 DAYS Sunday) 6 mL 3     No current facility-administered medications for this visit.        ALLERGIES:    Allergies   Allergen Reactions   • Codeine Nausea Only       Objective      @VITALS    Current Status 7/24/2018   ECOG score 1       General Appearance: Patient is awake, alert, oriented and in no acute distress. Patient is welldeveloped, wellnourished, and appears stated age.  HEENT: Normocephalic. Sclerae clear, conjunctiva pink, extraocular movements intact, pupils, round, " reactive to light and  accommodation. Mouth and throat are clear with moist oral mucosa.  NECK: Supple, no jugular venous distention, thyroid not enlarged.  LYMPH: No cervical, supraclavicular, axillary, or inguinal lymphadenopathy.  CHEST: Equal bilateral expansion, AP  diameter normal, resonant percussion note  LUNGS: Good air movement, no rales, rhonchi, rubs or wheezes with auscultation  CARDIO: Regular sinus rhythm, no murmurs, gallops or rubs.  ABDOMEN: Nondistended, soft, No tenderness, no guarding, no rebound, No hepatosplenomegaly. No abdominal masses. Bowel sounds positive. No hernia  GENITALIA: Not examined.  BREASTS: Not examined.  MUSKEL: No joint swelling, decreased motion, or inflammation  EXTREMS: No edema, clubbing, cyanosis, No varicose veins.  NEURO: Grossly nonfocal, Gait is coordinated and smooth, Cognition is preserved.  SKIN: No rashes, no ecchymoses, no petechia.  PSYCH: Oriented to time, place and person. Memory is preserved. Mood and affect appear normal      RECENT LABS:  Lab on 08/22/2018   Component Date Value Ref Range Status   • Glucose 08/22/2018 157* 70 - 100 mg/dL Final   • BUN 08/22/2018 29* 5 - 21 mg/dL Final   • Creatinine 08/22/2018 1.82* 0.50 - 1.40 mg/dL Final   • Sodium 08/22/2018 142  135 - 145 mmol/L Final   • Potassium 08/22/2018 4.2  3.5 - 5.3 mmol/L Final   • Chloride 08/22/2018 111* 98 - 110 mmol/L Final   • CO2 08/22/2018 22.0* 24.0 - 31.0 mmol/L Final   • Calcium 08/22/2018 8.7  8.4 - 10.4 mg/dL Final   • Total Protein 08/22/2018 6.9  6.3 - 8.7 g/dL Final   • Albumin 08/22/2018 3.80  3.50 - 5.00 g/dL Final   • ALT (SGPT) 08/22/2018 17  0 - 54 U/L Final   • AST (SGOT) 08/22/2018 27  7 - 45 U/L Final   • Alkaline Phosphatase 08/22/2018 51  24 - 120 U/L Final   • Total Bilirubin 08/22/2018 0.2  0.1 - 1.0 mg/dL Final   • eGFR Non African Amer 08/22/2018 29* >60 mL/min/1.73 Final   • Globulin 08/22/2018 3.1  gm/dL Final   • A/G Ratio 08/22/2018 1.2  1.1 - 2.5 g/dL Final    • BUN/Creatinine Ratio 08/22/2018 15.9  7.0 - 25.0 Final   • Anion Gap 08/22/2018 9.0  4.0 - 13.0 mmol/L Final   • WBC 08/22/2018 11.39* 4.80 - 10.80 10*3/mm3 Final   • RBC 08/22/2018 3.05* 4.20 - 5.40 10*6/mm3 Final   • Hemoglobin 08/22/2018 9.2* 12.0 - 16.0 g/dL Final   • Hematocrit 08/22/2018 28.6* 37.0 - 47.0 % Final   • MCV 08/22/2018 93.8  82.0 - 98.0 fL Final   • MCH 08/22/2018 30.2  28.0 - 32.0 pg Final   • MCHC 08/22/2018 32.2* 33.0 - 36.0 g/dL Final   • RDW 08/22/2018 17.6* 12.0 - 15.0 % Final   • RDW-SD 08/22/2018 60.4* 40.0 - 54.0 fl Final   • MPV 08/22/2018 10.3  6.0 - 12.0 fL Final   • Platelets 08/22/2018 238  130 - 400 10*3/mm3 Final   • Neutrophil % 08/22/2018 77.8  39.0 - 78.0 % Final   • Lymphocyte % 08/22/2018 13.5* 15.0 - 45.0 % Final   • Monocyte % 08/22/2018 5.1  4.0 - 12.0 % Final   • Eosinophil % 08/22/2018 2.5  0.0 - 4.0 % Final   • Basophil % 08/22/2018 0.4  0.0 - 2.0 % Final   • Immature Grans % 08/22/2018 0.7  0.0 - 5.0 % Final   • Neutrophils, Absolute 08/22/2018 8.86* 1.87 - 8.40 10*3/mm3 Final   • Lymphocytes, Absolute 08/22/2018 1.54  0.72 - 4.86 10*3/mm3 Final   • Monocytes, Absolute 08/22/2018 0.58  0.19 - 1.30 10*3/mm3 Final   • Eosinophils, Absolute 08/22/2018 0.28  0.00 - 0.70 10*3/mm3 Final   • Basophils, Absolute 08/22/2018 0.05  0.00 - 0.20 10*3/mm3 Final   • Immature Grans, Absolute 08/22/2018 0.08* 0.00 - 0.03 10*3/mm3 Final   • nRBC 08/22/2018 0.0  0.0 - 0.0 /100 WBC Final       RADIOLOGY:  No results found.         Assessment/Plan        Anemia secondary to chronic kidney disease, Stage III. .  2. Iron deficiency in the setting of chronic kidney disease, has benefitted from Feraheme. Had Feraheme in January, hemoglobin has  now near normalized at 10.1.  Plan continue watchful waiting return to clinic in 3 months time and check another CBC and iron studies.  If the iron is still low I may give additional intravenous iron.  .    Clinically patient asymptomatic.  I will  redraw her iron studies today.  Hemoglobin is a 9.7gm Today. Per CMS guidelines Rx Procrit 40,000 u s/q every month.    She has been bleeding per vaginum and is planned D & C. Heavy set Obese women are more prone to Uterine carcinomas, therefore, will check , 9 especially when she tells me her grand mom had Uterine CA ).  Awaiting D&C tommorow. My cut off for blood xfusion is a Ghb of 7gm%. She is 7.6. Because of Bleeding I fear she may be low in Iron. Will check Iron panel today, if low will infuse Ferraheme and then Rx with Procrit for CKD.  1 wk ago she had HSBO, no malignency. Hgb after 2 doses of Iron upto 9.2, Procrit today.       Marco A Melo MD    8/22/2018    1:32 PM

## 2018-09-19 ENCOUNTER — OFFICE VISIT (OUTPATIENT)
Dept: ONCOLOGY | Facility: CLINIC | Age: 58
End: 2018-09-19

## 2018-09-19 ENCOUNTER — LAB (OUTPATIENT)
Dept: LAB | Facility: HOSPITAL | Age: 58
End: 2018-09-19
Attending: INTERNAL MEDICINE

## 2018-09-19 ENCOUNTER — INFUSION (OUTPATIENT)
Dept: ONCOLOGY | Facility: HOSPITAL | Age: 58
End: 2018-09-19
Attending: INTERNAL MEDICINE

## 2018-09-19 VITALS
RESPIRATION RATE: 16 BRPM | HEART RATE: 60 BPM | WEIGHT: 293 LBS | HEIGHT: 60 IN | DIASTOLIC BLOOD PRESSURE: 82 MMHG | BODY MASS INDEX: 57.52 KG/M2 | SYSTOLIC BLOOD PRESSURE: 148 MMHG | TEMPERATURE: 98.4 F | OXYGEN SATURATION: 92 %

## 2018-09-19 VITALS
RESPIRATION RATE: 20 BRPM | DIASTOLIC BLOOD PRESSURE: 57 MMHG | BODY MASS INDEX: 57.52 KG/M2 | HEIGHT: 60 IN | SYSTOLIC BLOOD PRESSURE: 137 MMHG | OXYGEN SATURATION: 99 % | TEMPERATURE: 98.2 F | HEART RATE: 60 BPM | WEIGHT: 293 LBS

## 2018-09-19 DIAGNOSIS — D63.1 ANEMIA IN STAGE 3 CHRONIC KIDNEY DISEASE (HCC): ICD-10-CM

## 2018-09-19 DIAGNOSIS — N18.30 ANEMIA IN STAGE 3 CHRONIC KIDNEY DISEASE (HCC): Primary | ICD-10-CM

## 2018-09-19 DIAGNOSIS — N18.30 ANEMIA IN STAGE 3 CHRONIC KIDNEY DISEASE (HCC): ICD-10-CM

## 2018-09-19 DIAGNOSIS — D63.1 ANEMIA IN STAGE 3 CHRONIC KIDNEY DISEASE (HCC): Primary | ICD-10-CM

## 2018-09-19 LAB
ALBUMIN SERPL-MCNC: 3.9 G/DL (ref 3.5–5)
ALBUMIN/GLOB SERPL: 1.3 G/DL (ref 1.1–2.5)
ALP SERPL-CCNC: 68 U/L (ref 24–120)
ALT SERPL W P-5'-P-CCNC: 20 U/L (ref 0–54)
ANION GAP SERPL CALCULATED.3IONS-SCNC: 10 MMOL/L (ref 4–13)
AST SERPL-CCNC: 21 U/L (ref 7–45)
BASOPHILS # BLD AUTO: 0.04 10*3/MM3 (ref 0–0.2)
BASOPHILS NFR BLD AUTO: 0.4 % (ref 0–2)
BILIRUB SERPL-MCNC: 0.2 MG/DL (ref 0.1–1)
BUN BLD-MCNC: 38 MG/DL (ref 5–21)
BUN/CREAT SERPL: 18.9 (ref 7–25)
CALCIUM SPEC-SCNC: 9.1 MG/DL (ref 8.4–10.4)
CHLORIDE SERPL-SCNC: 109 MMOL/L (ref 98–110)
CO2 SERPL-SCNC: 25 MMOL/L (ref 24–31)
CREAT BLD-MCNC: 2.01 MG/DL (ref 0.5–1.4)
DEPRECATED RDW RBC AUTO: 58 FL (ref 40–54)
EOSINOPHIL # BLD AUTO: 0.27 10*3/MM3 (ref 0–0.7)
EOSINOPHIL NFR BLD AUTO: 2.6 % (ref 0–4)
ERYTHROCYTE [DISTWIDTH] IN BLOOD BY AUTOMATED COUNT: 16.9 % (ref 12–15)
GFR SERPL CREATININE-BSD FRML MDRD: 25 ML/MIN/1.73
GLOBULIN UR ELPH-MCNC: 3 GM/DL
GLUCOSE BLD-MCNC: 156 MG/DL (ref 70–100)
HCT VFR BLD AUTO: 29.8 % (ref 37–47)
HGB BLD-MCNC: 9.6 G/DL (ref 12–16)
HOLD SPECIMEN: NORMAL
HOLD SPECIMEN: NORMAL
IMM GRANULOCYTES # BLD: 0.06 10*3/MM3 (ref 0–0.03)
IMM GRANULOCYTES NFR BLD: 0.6 % (ref 0–5)
LYMPHOCYTES # BLD AUTO: 1.72 10*3/MM3 (ref 0.72–4.86)
LYMPHOCYTES NFR BLD AUTO: 16.7 % (ref 15–45)
MCH RBC QN AUTO: 30.1 PG (ref 28–32)
MCHC RBC AUTO-ENTMCNC: 32.2 G/DL (ref 33–36)
MCV RBC AUTO: 93.4 FL (ref 82–98)
MONOCYTES # BLD AUTO: 0.59 10*3/MM3 (ref 0.19–1.3)
MONOCYTES NFR BLD AUTO: 5.7 % (ref 4–12)
NEUTROPHILS # BLD AUTO: 7.6 10*3/MM3 (ref 1.87–8.4)
NEUTROPHILS NFR BLD AUTO: 74 % (ref 39–78)
NRBC BLD MANUAL-RTO: 0 /100 WBC (ref 0–0)
PLATELET # BLD AUTO: 197 10*3/MM3 (ref 130–400)
PMV BLD AUTO: 10.1 FL (ref 6–12)
POTASSIUM BLD-SCNC: 3.9 MMOL/L (ref 3.5–5.3)
PROT SERPL-MCNC: 6.9 G/DL (ref 6.3–8.7)
RBC # BLD AUTO: 3.19 10*6/MM3 (ref 4.2–5.4)
SODIUM BLD-SCNC: 144 MMOL/L (ref 135–145)
WBC NRBC COR # BLD: 10.28 10*3/MM3 (ref 4.8–10.8)

## 2018-09-19 PROCEDURE — 80053 COMPREHEN METABOLIC PANEL: CPT

## 2018-09-19 PROCEDURE — 96372 THER/PROPH/DIAG INJ SC/IM: CPT

## 2018-09-19 PROCEDURE — 63510000001 EPOETIN ALFA PER 1000 UNITS: Performed by: INTERNAL MEDICINE

## 2018-09-19 PROCEDURE — 99214 OFFICE O/P EST MOD 30 MIN: CPT | Performed by: INTERNAL MEDICINE

## 2018-09-19 PROCEDURE — 36415 COLL VENOUS BLD VENIPUNCTURE: CPT

## 2018-09-19 PROCEDURE — 85025 COMPLETE CBC W/AUTO DIFF WBC: CPT

## 2018-09-19 RX ADMIN — ERYTHROPOIETIN 40000 UNITS: 40000 INJECTION, SOLUTION INTRAVENOUS; SUBCUTANEOUS at 14:33

## 2018-09-19 NOTE — PROGRESS NOTES
[facility]  HEMATOLOGY & ONCOLOGY        Subjective     VISIT DIAGNOSIS:   Encounter Diagnosis   Name Primary?   • Anemia in stage 3 chronic kidney disease        REASON FOR VISIT:     No chief complaint on file.       HEMATOLOGY / ONCOLOGY HISTORY:Ms. Shrestha is a 55 year old female with past medical history of hypertension, chronic kidney disease, diabetes insulindependent  type 2   No history exists.     [No treatment plan]  Cancer Staging Information:  No matching staging information was found for the patient.      INTERVAL HISTORY  Patient ID: Maria C Shrestha is a 58 y.o. year old female   Anemia secondary to chronic kidney disease, Stage III. Patient has not required VALERIE therapy this far.  2. Iron deficiency in the setting of chronic kidney disease, has benefitted from Feraheme. Had Feraheme in January, hemoglobin has  now near normalized at 10.7        Review of Systems   Constitutional: Negative.    HENT: Negative.    Eyes: Negative.    Respiratory: Negative.    Cardiovascular: Negative.    Gastrointestinal: Negative.    Endocrine: Negative.    Genitourinary: Negative.    Musculoskeletal: Negative.    Skin: Negative.    Allergic/Immunologic: Negative.    Neurological: Negative.    Hematological: Negative.    Psychiatric/Behavioral: Negative.             Medications:    Current Outpatient Prescriptions   Medication Sig Dispense Refill   • allopurinol (ZYLOPRIM) 100 MG tablet Take 100 mg by mouth 2 (Two) Times a Day.     • amLODIPine (NORVASC) 5 MG tablet      • aspirin 81 MG tablet Take 81 mg by mouth Daily. Stop 7/22/2018     • busPIRone (BUSPAR) 10 MG tablet buspirone 10 mg tablet   Take 2 tablets twice a day by oral route as needed for 90 days.     • carvedilol (COREG) 3.125 MG tablet Take 3.125 mg by mouth 2 (Two) Times a Day.     • cetirizine (zyrTEC) 10 MG tablet Take 10 mg by mouth As Needed.     • Cholecalciferol (VITAMIN D3) 08143 units tablet Vitamin D2 50,000 unit capsule   Take 1  "capsule every week by oral route. Sunday     • cinacalcet (SENSIPAR) 30 MG tablet Take 1 tablet by mouth Daily. 30 tablet 11   • citalopram (CeleXA) 20 MG tablet Take 20 mg by mouth Daily.     • diazePAM (VALIUM) 2 MG tablet Take 2 mg by mouth As Needed for Anxiety.     • fluticasone (VERAMYST) 27.5 MCG/SPRAY nasal spray 2 sprays into each nostril Daily.     • furosemide (LASIX) 40 MG tablet Take 1 tablet by mouth Daily. 90 tablet 3   • Glucose Blood (BLOOD GLUCOSE TEST) strip Use 4 x daily, use any brand covered by insurance or same brand as before 360 each 3   • HUMULIN R 500 UNIT/ML CONCENTRATED injection USING A U100 SYRINGE DRAW UP TO THE DIRECTED UNIT ROXI AND INJECT FOUR TIMES A DAY (UP TO 30 UNIT ROXI FOUR TIMES A DAY) (Patient taking differently: USING A U100 SYRINGE DRAW UP TO THE DIRECTED UNIT ROXI AND INJECT FOUR TIMES A DAY (UP TO 30 UNIT ROXI FOUR TIMES A DAY)  6 units with meals) 100 mL 3   • ibuprofen (ADVIL,MOTRIN) 800 MG tablet Take 800 mg by mouth Every 8 (Eight) Hours As Needed for Mild Pain .     • insulin glargine (LANTUS) 100 UNIT/ML injection 200 units daily (Patient taking differently: Inject 180 Units under the skin Every Night. 200 units daily) 18 each 3   • Insulin Syringe 31G X 5/16\" 1 ML misc 4 x daily 120 each 11   • lamoTRIgine (LaMICtal) 50 MG tablet dispersible disintegrating tablet Take 50 mg by mouth Every Night.     • Lancets (FREESTYLE) lancets FOUR TIMES A  each 11   • levothyroxine (SYNTHROID, LEVOTHROID) 50 MCG tablet Take 1 tablet by mouth Daily. 90 tablet 1   • lisinopril (PRINIVIL,ZESTRIL) 20 MG tablet Take 20 mg by mouth 2 (Two) Times a Day.     • Multiple Vitamins-Minerals (MULTIVITAMIN ADULT PO) Take 1 tablet by mouth Daily.     • norethindrone (AYGESTIN) 5 MG tablet Take 1 tablet by mouth Daily. 30 tablet 1   • potassium gluconate 595 (99 K) MG tablet tablet potassium gluconate 595 mg (99 mg) tablet   Take 2 tablets every day by oral route.     • rosuvastatin " (CRESTOR) 10 MG tablet Take 10 mg by mouth Daily.     • sodium bicarbonate 650 MG tablet Take 650 mg by mouth 3 (Three) Times a Day.     • traMADol (ULTRAM) 50 MG tablet Take 1 tablet by mouth Every 6 (Six) Hours As Needed for Moderate Pain . 8 tablet 0   • TRULICITY 1.5 MG/0.5ML solution pen-injector INJECT 1.5 MG UNDER THE SKIN EVERY 7 DAYS (Patient taking differently: INJECT 1.5 MG UNDER THE SKIN EVERY 7 DAYS Sunday) 6 mL 3     No current facility-administered medications for this visit.        ALLERGIES:    Allergies   Allergen Reactions   • Codeine Nausea Only       Objective      @VITALS    Current Status 9/19/2018   ECOG score 1       General Appearance: Patient is awake, alert, oriented and in no acute distress. Patient is welldeveloped, wellnourished, and appears stated age.  HEENT: Normocephalic. Sclerae clear, conjunctiva pink, extraocular movements intact, pupils, round, reactive to light and  accommodation. Mouth and throat are clear with moist oral mucosa.  NECK: Supple, no jugular venous distention, thyroid not enlarged.  LYMPH: No cervical, supraclavicular, axillary, or inguinal lymphadenopathy.  CHEST: Equal bilateral expansion, AP  diameter normal, resonant percussion note  LUNGS: Good air movement, no rales, rhonchi, rubs or wheezes with auscultation  CARDIO: Regular sinus rhythm, no murmurs, gallops or rubs.  ABDOMEN: Nondistended, soft, No tenderness, no guarding, no rebound, No hepatosplenomegaly. No abdominal masses. Bowel sounds positive. No hernia  GENITALIA: Not examined.  BREASTS: Not examined.  MUSKEL: No joint swelling, decreased motion, or inflammation  EXTREMS: No edema, clubbing, cyanosis, No varicose veins.  NEURO: Grossly nonfocal, Gait is coordinated and smooth, Cognition is preserved.  SKIN: No rashes, no ecchymoses, no petechia.  PSYCH: Oriented to time, place and person. Memory is preserved. Mood and affect appear normal      RECENT LABS:  Lab on 09/19/2018   Component Date  Value Ref Range Status   • Glucose 09/19/2018 156* 70 - 100 mg/dL Final   • BUN 09/19/2018 38* 5 - 21 mg/dL Final   • Creatinine 09/19/2018 2.01* 0.50 - 1.40 mg/dL Final   • Sodium 09/19/2018 144  135 - 145 mmol/L Final   • Potassium 09/19/2018 3.9  3.5 - 5.3 mmol/L Final   • Chloride 09/19/2018 109  98 - 110 mmol/L Final   • CO2 09/19/2018 25.0  24.0 - 31.0 mmol/L Final   • Calcium 09/19/2018 9.1  8.4 - 10.4 mg/dL Final   • Total Protein 09/19/2018 6.9  6.3 - 8.7 g/dL Final   • Albumin 09/19/2018 3.90  3.50 - 5.00 g/dL Final   • ALT (SGPT) 09/19/2018 20  0 - 54 U/L Final   • AST (SGOT) 09/19/2018 21  7 - 45 U/L Final   • Alkaline Phosphatase 09/19/2018 68  24 - 120 U/L Final   • Total Bilirubin 09/19/2018 0.2  0.1 - 1.0 mg/dL Final   • eGFR Non African Amer 09/19/2018 25* >60 mL/min/1.73 Final   • Globulin 09/19/2018 3.0  gm/dL Final   • A/G Ratio 09/19/2018 1.3  1.1 - 2.5 g/dL Final   • BUN/Creatinine Ratio 09/19/2018 18.9  7.0 - 25.0 Final   • Anion Gap 09/19/2018 10.0  4.0 - 13.0 mmol/L Final   • WBC 09/19/2018 10.28  4.80 - 10.80 10*3/mm3 Final   • RBC 09/19/2018 3.19* 4.20 - 5.40 10*6/mm3 Final   • Hemoglobin 09/19/2018 9.6* 12.0 - 16.0 g/dL Final   • Hematocrit 09/19/2018 29.8* 37.0 - 47.0 % Final   • MCV 09/19/2018 93.4  82.0 - 98.0 fL Final   • MCH 09/19/2018 30.1  28.0 - 32.0 pg Final   • MCHC 09/19/2018 32.2* 33.0 - 36.0 g/dL Final   • RDW 09/19/2018 16.9* 12.0 - 15.0 % Final   • RDW-SD 09/19/2018 58.0* 40.0 - 54.0 fl Final   • MPV 09/19/2018 10.1  6.0 - 12.0 fL Final   • Platelets 09/19/2018 197  130 - 400 10*3/mm3 Final   • Neutrophil % 09/19/2018 74.0  39.0 - 78.0 % Final   • Lymphocyte % 09/19/2018 16.7  15.0 - 45.0 % Final   • Monocyte % 09/19/2018 5.7  4.0 - 12.0 % Final   • Eosinophil % 09/19/2018 2.6  0.0 - 4.0 % Final   • Basophil % 09/19/2018 0.4  0.0 - 2.0 % Final   • Immature Grans % 09/19/2018 0.6  0.0 - 5.0 % Final   • Neutrophils, Absolute 09/19/2018 7.60  1.87 - 8.40 10*3/mm3 Final   •  Lymphocytes, Absolute 09/19/2018 1.72  0.72 - 4.86 10*3/mm3 Final   • Monocytes, Absolute 09/19/2018 0.59  0.19 - 1.30 10*3/mm3 Final   • Eosinophils, Absolute 09/19/2018 0.27  0.00 - 0.70 10*3/mm3 Final   • Basophils, Absolute 09/19/2018 0.04  0.00 - 0.20 10*3/mm3 Final   • Immature Grans, Absolute 09/19/2018 0.06* 0.00 - 0.03 10*3/mm3 Final   • nRBC 09/19/2018 0.0  0.0 - 0.0 /100 WBC Final       RADIOLOGY:  No results found.         Assessment/Plan        Anemia secondary to chronic kidney disease, Stage III. Patient has not required VALERIE therapy this far.  2. Iron deficiency in the setting of chronic kidney disease, has benefitted from Feraheme. Had Feraheme in January, hemoglobin has  now near normalized at 10.3.  Plan continue watchful waiting return to clinic in 3 months time and check another CBC and iron studies.  If the iron is still low I may give additional intravenous iron.  Her CMS guidelines I cannot give her Procrit unless hemoglobin is less than 10.    Clinically patient asymptomatic.  I will redraw her iron studies today.  Hemoglobin is a 10.  Return to clinic in 3 months time.            Marco A Melo MD    9/19/2018    1:49 PM             [facility]  HEMATOLOGY & ONCOLOGY        Subjective     VISIT DIAGNOSIS:   Encounter Diagnosis   Name Primary?   • Anemia in stage 3 chronic kidney disease        REASON FOR VISIT:     No chief complaint on file.       HEMATOLOGY / ONCOLOGY HISTORY:Ms. Shrestha is a 55 year old female with past medical history of hypertension, chronic kidney disease, diabetes insulindependent  type 2   No history exists.     [No treatment plan]  Cancer Staging Information:  No matching staging information was found for the patient.      INTERVAL HISTORY  Patient ID: aMria C Shrestha is a 58 y.o. year old female   Anemia secondary to chronic kidney disease, Stage III. Patient has not required VALERIE therapy this far.  2. Iron deficiency in the setting of chronic kidney  disease, has benefitted from Feraheme. Had Feraheme in January, hemoglobin has  now near normalized at 10.7        Review of Systems   Constitutional: Negative.    HENT: Negative.    Eyes: Negative.    Respiratory: Negative.    Cardiovascular: Negative.    Gastrointestinal: Negative.    Endocrine: Negative.    Genitourinary: Negative.    Musculoskeletal: Negative.    Skin: Negative.    Allergic/Immunologic: Negative.    Neurological: Negative.    Hematological: Negative.    Psychiatric/Behavioral: Negative.             Medications:    Current Outpatient Prescriptions   Medication Sig Dispense Refill   • allopurinol (ZYLOPRIM) 100 MG tablet Take 100 mg by mouth 2 (Two) Times a Day.     • amLODIPine (NORVASC) 5 MG tablet      • aspirin 81 MG tablet Take 81 mg by mouth Daily. Stop 7/22/2018     • busPIRone (BUSPAR) 10 MG tablet buspirone 10 mg tablet   Take 2 tablets twice a day by oral route as needed for 90 days.     • carvedilol (COREG) 3.125 MG tablet Take 3.125 mg by mouth 2 (Two) Times a Day.     • cetirizine (zyrTEC) 10 MG tablet Take 10 mg by mouth As Needed.     • Cholecalciferol (VITAMIN D3) 07905 units tablet Vitamin D2 50,000 unit capsule   Take 1 capsule every week by oral route. Sunday     • cinacalcet (SENSIPAR) 30 MG tablet Take 1 tablet by mouth Daily. 30 tablet 11   • citalopram (CeleXA) 20 MG tablet Take 20 mg by mouth Daily.     • diazePAM (VALIUM) 2 MG tablet Take 2 mg by mouth As Needed for Anxiety.     • fluticasone (VERAMYST) 27.5 MCG/SPRAY nasal spray 2 sprays into each nostril Daily.     • furosemide (LASIX) 40 MG tablet Take 1 tablet by mouth Daily. 90 tablet 3   • Glucose Blood (BLOOD GLUCOSE TEST) strip Use 4 x daily, use any brand covered by insurance or same brand as before 360 each 3   • HUMULIN R 500 UNIT/ML CONCENTRATED injection USING A U100 SYRINGE DRAW UP TO THE DIRECTED UNIT ROXI AND INJECT FOUR TIMES A DAY (UP TO 30 UNIT ROXI FOUR TIMES A DAY) (Patient taking differently: USING A  "U100 SYRINGE DRAW UP TO THE DIRECTED UNIT ROXI AND INJECT FOUR TIMES A DAY (UP TO 30 UNIT ROXI FOUR TIMES A DAY)  6 units with meals) 100 mL 3   • ibuprofen (ADVIL,MOTRIN) 800 MG tablet Take 800 mg by mouth Every 8 (Eight) Hours As Needed for Mild Pain .     • insulin glargine (LANTUS) 100 UNIT/ML injection 200 units daily (Patient taking differently: Inject 180 Units under the skin Every Night. 200 units daily) 18 each 3   • Insulin Syringe 31G X 5/16\" 1 ML misc 4 x daily 120 each 11   • lamoTRIgine (LaMICtal) 50 MG tablet dispersible disintegrating tablet Take 50 mg by mouth Every Night.     • Lancets (FREESTYLE) lancets FOUR TIMES A  each 11   • levothyroxine (SYNTHROID, LEVOTHROID) 50 MCG tablet Take 1 tablet by mouth Daily. 90 tablet 1   • lisinopril (PRINIVIL,ZESTRIL) 20 MG tablet Take 20 mg by mouth 2 (Two) Times a Day.     • Multiple Vitamins-Minerals (MULTIVITAMIN ADULT PO) Take 1 tablet by mouth Daily.     • norethindrone (AYGESTIN) 5 MG tablet Take 1 tablet by mouth Daily. 30 tablet 1   • potassium gluconate 595 (99 K) MG tablet tablet potassium gluconate 595 mg (99 mg) tablet   Take 2 tablets every day by oral route.     • rosuvastatin (CRESTOR) 10 MG tablet Take 10 mg by mouth Daily.     • sodium bicarbonate 650 MG tablet Take 650 mg by mouth 3 (Three) Times a Day.     • traMADol (ULTRAM) 50 MG tablet Take 1 tablet by mouth Every 6 (Six) Hours As Needed for Moderate Pain . 8 tablet 0   • TRULICITY 1.5 MG/0.5ML solution pen-injector INJECT 1.5 MG UNDER THE SKIN EVERY 7 DAYS (Patient taking differently: INJECT 1.5 MG UNDER THE SKIN EVERY 7 DAYS Sunday) 6 mL 3     No current facility-administered medications for this visit.        ALLERGIES:    Allergies   Allergen Reactions   • Codeine Nausea Only       Objective      @VITALS    Current Status 9/19/2018   ECOG score 1       General Appearance: Patient is awake, alert, oriented and in no acute distress. Patient is welldeveloped, wellnourished, and " appears stated age.  HEENT: Normocephalic. Sclerae clear, conjunctiva pink, extraocular movements intact, pupils, round, reactive to light and  accommodation. Mouth and throat are clear with moist oral mucosa.  NECK: Supple, no jugular venous distention, thyroid not enlarged.  LYMPH: No cervical, supraclavicular, axillary, or inguinal lymphadenopathy.  CHEST: Equal bilateral expansion, AP  diameter normal, resonant percussion note  LUNGS: Good air movement, no rales, rhonchi, rubs or wheezes with auscultation  CARDIO: Regular sinus rhythm, no murmurs, gallops or rubs.  ABDOMEN: Nondistended, soft, No tenderness, no guarding, no rebound, No hepatosplenomegaly. No abdominal masses. Bowel sounds positive. No hernia  GENITALIA: Not examined.  BREASTS: Not examined.  MUSKEL: No joint swelling, decreased motion, or inflammation  EXTREMS: No edema, clubbing, cyanosis, No varicose veins.  NEURO: Grossly nonfocal, Gait is coordinated and smooth, Cognition is preserved.  SKIN: No rashes, no ecchymoses, no petechia.  PSYCH: Oriented to time, place and person. Memory is preserved. Mood and affect appear normal      RECENT LABS:  Lab on 09/19/2018   Component Date Value Ref Range Status   • Glucose 09/19/2018 156* 70 - 100 mg/dL Final   • BUN 09/19/2018 38* 5 - 21 mg/dL Final   • Creatinine 09/19/2018 2.01* 0.50 - 1.40 mg/dL Final   • Sodium 09/19/2018 144  135 - 145 mmol/L Final   • Potassium 09/19/2018 3.9  3.5 - 5.3 mmol/L Final   • Chloride 09/19/2018 109  98 - 110 mmol/L Final   • CO2 09/19/2018 25.0  24.0 - 31.0 mmol/L Final   • Calcium 09/19/2018 9.1  8.4 - 10.4 mg/dL Final   • Total Protein 09/19/2018 6.9  6.3 - 8.7 g/dL Final   • Albumin 09/19/2018 3.90  3.50 - 5.00 g/dL Final   • ALT (SGPT) 09/19/2018 20  0 - 54 U/L Final   • AST (SGOT) 09/19/2018 21  7 - 45 U/L Final   • Alkaline Phosphatase 09/19/2018 68  24 - 120 U/L Final   • Total Bilirubin 09/19/2018 0.2  0.1 - 1.0 mg/dL Final   • eGFR Non African Amer 09/19/2018  25* >60 mL/min/1.73 Final   • Globulin 09/19/2018 3.0  gm/dL Final   • A/G Ratio 09/19/2018 1.3  1.1 - 2.5 g/dL Final   • BUN/Creatinine Ratio 09/19/2018 18.9  7.0 - 25.0 Final   • Anion Gap 09/19/2018 10.0  4.0 - 13.0 mmol/L Final   • WBC 09/19/2018 10.28  4.80 - 10.80 10*3/mm3 Final   • RBC 09/19/2018 3.19* 4.20 - 5.40 10*6/mm3 Final   • Hemoglobin 09/19/2018 9.6* 12.0 - 16.0 g/dL Final   • Hematocrit 09/19/2018 29.8* 37.0 - 47.0 % Final   • MCV 09/19/2018 93.4  82.0 - 98.0 fL Final   • MCH 09/19/2018 30.1  28.0 - 32.0 pg Final   • MCHC 09/19/2018 32.2* 33.0 - 36.0 g/dL Final   • RDW 09/19/2018 16.9* 12.0 - 15.0 % Final   • RDW-SD 09/19/2018 58.0* 40.0 - 54.0 fl Final   • MPV 09/19/2018 10.1  6.0 - 12.0 fL Final   • Platelets 09/19/2018 197  130 - 400 10*3/mm3 Final   • Neutrophil % 09/19/2018 74.0  39.0 - 78.0 % Final   • Lymphocyte % 09/19/2018 16.7  15.0 - 45.0 % Final   • Monocyte % 09/19/2018 5.7  4.0 - 12.0 % Final   • Eosinophil % 09/19/2018 2.6  0.0 - 4.0 % Final   • Basophil % 09/19/2018 0.4  0.0 - 2.0 % Final   • Immature Grans % 09/19/2018 0.6  0.0 - 5.0 % Final   • Neutrophils, Absolute 09/19/2018 7.60  1.87 - 8.40 10*3/mm3 Final   • Lymphocytes, Absolute 09/19/2018 1.72  0.72 - 4.86 10*3/mm3 Final   • Monocytes, Absolute 09/19/2018 0.59  0.19 - 1.30 10*3/mm3 Final   • Eosinophils, Absolute 09/19/2018 0.27  0.00 - 0.70 10*3/mm3 Final   • Basophils, Absolute 09/19/2018 0.04  0.00 - 0.20 10*3/mm3 Final   • Immature Grans, Absolute 09/19/2018 0.06* 0.00 - 0.03 10*3/mm3 Final   • nRBC 09/19/2018 0.0  0.0 - 0.0 /100 WBC Final       RADIOLOGY:  No results found.         Assessment/Plan        Anemia secondary to chronic kidney disease, Stage III. Patient has not required VALERIE therapy this far.  2. Iron deficiency in the setting of chronic kidney disease, has benefitted from Feraheme. Had Feraheme in January, hemoglobin has  now near normalized at 10.2.  Plan continue watchful waiting return to clinic in 3  months time and check another CBC and iron studies.  If the iron is still low I may give additional intravenous iron.  .    Clinically patient asymptomatic.  I will redraw her iron studies today.  Hemoglobin is a 10.3gm Today. Per CMS guidelines Rx Procrit 40,000 u s/q every month.       Marco A Melo MD    9/19/2018    1:49 PM             [facility]  HEMATOLOGY & ONCOLOGY        Subjective     VISIT DIAGNOSIS:   Encounter Diagnosis   Name Primary?   • Anemia in stage 3 chronic kidney disease        REASON FOR VISIT:     No chief complaint on file.       HEMATOLOGY / ONCOLOGY HISTORY:Ms. Shrestha is a 55 year old female with past medical history of hypertension, chronic kidney disease, diabetes insulindependent  type 2   No history exists.     [No treatment plan]  Cancer Staging Information:  No matching staging information was found for the patient.      INTERVAL HISTORY  Patient ID: Maria C Shrestha is a 58 y.o. year old female   Anemia secondary to chronic kidney disease, Stage III. Patient has not required VALERIE therapy this far.  2. Iron deficiency in the setting of chronic kidney disease, has benefitted from Feraheme. Had Feraheme in January, hemoglobin has  now near normalized at 10.7        Review of Systems   Constitutional: Negative.    HENT: Negative.    Eyes: Negative.    Respiratory: Negative.    Cardiovascular: Negative.    Gastrointestinal: Negative.    Endocrine: Negative.    Genitourinary: Negative.    Musculoskeletal: Negative.    Skin: Negative.    Allergic/Immunologic: Negative.    Neurological: Negative.    Hematological: Negative.    Psychiatric/Behavioral: Negative.             Medications:    Current Outpatient Prescriptions   Medication Sig Dispense Refill   • allopurinol (ZYLOPRIM) 100 MG tablet Take 100 mg by mouth 2 (Two) Times a Day.     • amLODIPine (NORVASC) 5 MG tablet      • aspirin 81 MG tablet Take 81 mg by mouth Daily. Stop 7/22/2018     • busPIRone (BUSPAR) 10 MG tablet  "buspirone 10 mg tablet   Take 2 tablets twice a day by oral route as needed for 90 days.     • carvedilol (COREG) 3.125 MG tablet Take 3.125 mg by mouth 2 (Two) Times a Day.     • cetirizine (zyrTEC) 10 MG tablet Take 10 mg by mouth As Needed.     • Cholecalciferol (VITAMIN D3) 57256 units tablet Vitamin D2 50,000 unit capsule   Take 1 capsule every week by oral route. Sunday     • cinacalcet (SENSIPAR) 30 MG tablet Take 1 tablet by mouth Daily. 30 tablet 11   • citalopram (CeleXA) 20 MG tablet Take 20 mg by mouth Daily.     • diazePAM (VALIUM) 2 MG tablet Take 2 mg by mouth As Needed for Anxiety.     • fluticasone (VERAMYST) 27.5 MCG/SPRAY nasal spray 2 sprays into each nostril Daily.     • furosemide (LASIX) 40 MG tablet Take 1 tablet by mouth Daily. 90 tablet 3   • Glucose Blood (BLOOD GLUCOSE TEST) strip Use 4 x daily, use any brand covered by insurance or same brand as before 360 each 3   • HUMULIN R 500 UNIT/ML CONCENTRATED injection USING A U100 SYRINGE DRAW UP TO THE DIRECTED UNIT ROXI AND INJECT FOUR TIMES A DAY (UP TO 30 UNIT ROXI FOUR TIMES A DAY) (Patient taking differently: USING A U100 SYRINGE DRAW UP TO THE DIRECTED UNIT ROXI AND INJECT FOUR TIMES A DAY (UP TO 30 UNIT ROXI FOUR TIMES A DAY)  6 units with meals) 100 mL 3   • ibuprofen (ADVIL,MOTRIN) 800 MG tablet Take 800 mg by mouth Every 8 (Eight) Hours As Needed for Mild Pain .     • insulin glargine (LANTUS) 100 UNIT/ML injection 200 units daily (Patient taking differently: Inject 180 Units under the skin Every Night. 200 units daily) 18 each 3   • Insulin Syringe 31G X 5/16\" 1 ML misc 4 x daily 120 each 11   • lamoTRIgine (LaMICtal) 50 MG tablet dispersible disintegrating tablet Take 50 mg by mouth Every Night.     • Lancets (FREESTYLE) lancets FOUR TIMES A  each 11   • levothyroxine (SYNTHROID, LEVOTHROID) 50 MCG tablet Take 1 tablet by mouth Daily. 90 tablet 1   • lisinopril (PRINIVIL,ZESTRIL) 20 MG tablet Take 20 mg by mouth 2 (Two) " Times a Day.     • Multiple Vitamins-Minerals (MULTIVITAMIN ADULT PO) Take 1 tablet by mouth Daily.     • norethindrone (AYGESTIN) 5 MG tablet Take 1 tablet by mouth Daily. 30 tablet 1   • potassium gluconate 595 (99 K) MG tablet tablet potassium gluconate 595 mg (99 mg) tablet   Take 2 tablets every day by oral route.     • rosuvastatin (CRESTOR) 10 MG tablet Take 10 mg by mouth Daily.     • sodium bicarbonate 650 MG tablet Take 650 mg by mouth 3 (Three) Times a Day.     • traMADol (ULTRAM) 50 MG tablet Take 1 tablet by mouth Every 6 (Six) Hours As Needed for Moderate Pain . 8 tablet 0   • TRULICITY 1.5 MG/0.5ML solution pen-injector INJECT 1.5 MG UNDER THE SKIN EVERY 7 DAYS (Patient taking differently: INJECT 1.5 MG UNDER THE SKIN EVERY 7 DAYS Sunday) 6 mL 3     No current facility-administered medications for this visit.        ALLERGIES:    Allergies   Allergen Reactions   • Codeine Nausea Only       Objective      @VITALS    Current Status 9/19/2018   ECOG score 1       General Appearance: Patient is awake, alert, oriented and in no acute distress. Patient is welldeveloped, wellnourished, and appears stated age.  HEENT: Normocephalic. Sclerae clear, conjunctiva pink, extraocular movements intact, pupils, round, reactive to light and  accommodation. Mouth and throat are clear with moist oral mucosa.  NECK: Supple, no jugular venous distention, thyroid not enlarged.  LYMPH: No cervical, supraclavicular, axillary, or inguinal lymphadenopathy.  CHEST: Equal bilateral expansion, AP  diameter normal, resonant percussion note  LUNGS: Good air movement, no rales, rhonchi, rubs or wheezes with auscultation  CARDIO: Regular sinus rhythm, no murmurs, gallops or rubs.  ABDOMEN: Nondistended, soft, No tenderness, no guarding, no rebound, No hepatosplenomegaly. No abdominal masses. Bowel sounds positive. No hernia  GENITALIA: Not examined.  BREASTS: Not examined.  MUSKEL: No joint swelling, decreased motion, or  inflammation  EXTREMS: No edema, clubbing, cyanosis, No varicose veins.  NEURO: Grossly nonfocal, Gait is coordinated and smooth, Cognition is preserved.  SKIN: No rashes, no ecchymoses, no petechia.  PSYCH: Oriented to time, place and person. Memory is preserved. Mood and affect appear normal      RECENT LABS:  Lab on 09/19/2018   Component Date Value Ref Range Status   • Glucose 09/19/2018 156* 70 - 100 mg/dL Final   • BUN 09/19/2018 38* 5 - 21 mg/dL Final   • Creatinine 09/19/2018 2.01* 0.50 - 1.40 mg/dL Final   • Sodium 09/19/2018 144  135 - 145 mmol/L Final   • Potassium 09/19/2018 3.9  3.5 - 5.3 mmol/L Final   • Chloride 09/19/2018 109  98 - 110 mmol/L Final   • CO2 09/19/2018 25.0  24.0 - 31.0 mmol/L Final   • Calcium 09/19/2018 9.1  8.4 - 10.4 mg/dL Final   • Total Protein 09/19/2018 6.9  6.3 - 8.7 g/dL Final   • Albumin 09/19/2018 3.90  3.50 - 5.00 g/dL Final   • ALT (SGPT) 09/19/2018 20  0 - 54 U/L Final   • AST (SGOT) 09/19/2018 21  7 - 45 U/L Final   • Alkaline Phosphatase 09/19/2018 68  24 - 120 U/L Final   • Total Bilirubin 09/19/2018 0.2  0.1 - 1.0 mg/dL Final   • eGFR Non African Amer 09/19/2018 25* >60 mL/min/1.73 Final   • Globulin 09/19/2018 3.0  gm/dL Final   • A/G Ratio 09/19/2018 1.3  1.1 - 2.5 g/dL Final   • BUN/Creatinine Ratio 09/19/2018 18.9  7.0 - 25.0 Final   • Anion Gap 09/19/2018 10.0  4.0 - 13.0 mmol/L Final   • WBC 09/19/2018 10.28  4.80 - 10.80 10*3/mm3 Final   • RBC 09/19/2018 3.19* 4.20 - 5.40 10*6/mm3 Final   • Hemoglobin 09/19/2018 9.6* 12.0 - 16.0 g/dL Final   • Hematocrit 09/19/2018 29.8* 37.0 - 47.0 % Final   • MCV 09/19/2018 93.4  82.0 - 98.0 fL Final   • MCH 09/19/2018 30.1  28.0 - 32.0 pg Final   • MCHC 09/19/2018 32.2* 33.0 - 36.0 g/dL Final   • RDW 09/19/2018 16.9* 12.0 - 15.0 % Final   • RDW-SD 09/19/2018 58.0* 40.0 - 54.0 fl Final   • MPV 09/19/2018 10.1  6.0 - 12.0 fL Final   • Platelets 09/19/2018 197  130 - 400 10*3/mm3 Final   • Neutrophil % 09/19/2018 74.0  39.0 -  78.0 % Final   • Lymphocyte % 09/19/2018 16.7  15.0 - 45.0 % Final   • Monocyte % 09/19/2018 5.7  4.0 - 12.0 % Final   • Eosinophil % 09/19/2018 2.6  0.0 - 4.0 % Final   • Basophil % 09/19/2018 0.4  0.0 - 2.0 % Final   • Immature Grans % 09/19/2018 0.6  0.0 - 5.0 % Final   • Neutrophils, Absolute 09/19/2018 7.60  1.87 - 8.40 10*3/mm3 Final   • Lymphocytes, Absolute 09/19/2018 1.72  0.72 - 4.86 10*3/mm3 Final   • Monocytes, Absolute 09/19/2018 0.59  0.19 - 1.30 10*3/mm3 Final   • Eosinophils, Absolute 09/19/2018 0.27  0.00 - 0.70 10*3/mm3 Final   • Basophils, Absolute 09/19/2018 0.04  0.00 - 0.20 10*3/mm3 Final   • Immature Grans, Absolute 09/19/2018 0.06* 0.00 - 0.03 10*3/mm3 Final   • nRBC 09/19/2018 0.0  0.0 - 0.0 /100 WBC Final       RADIOLOGY:  No results found.         Assessment/Plan        Anemia secondary to chronic kidney disease, Stage III. Patient has not required VALERIE therapy this far.  2. Iron deficiency in the setting of chronic kidney disease, has benefitted from Feraheme. Had Feraheme in January, hemoglobin has  now near normalized at 10.3.  Plan continue watchful waiting return to clinic in 3 months time and check another CBC and iron studies.  If the iron is still low I may give additional intravenous iron.  Her CMS guidelines I cannot give her Procrit unless hemoglobin is less than 10.    Clinically patient asymptomatic.  I will redraw her iron studies today.  Hemoglobin is a 10.  Return to clinic in 3 months time.            Marco A Melo MD    9/19/2018    1:49 PM             [facility]  HEMATOLOGY & ONCOLOGY        Subjective     VISIT DIAGNOSIS:   Encounter Diagnosis   Name Primary?   • Anemia in stage 3 chronic kidney disease        REASON FOR VISIT:     No chief complaint on file.       HEMATOLOGY / ONCOLOGY HISTORY:Ms. Shrestha is a 55 year old female with past medical history of hypertension, chronic kidney disease, diabetes insulindependent  type 2   No history exists.      [No treatment plan]  Cancer Staging Information:  No matching staging information was found for the patient.      INTERVAL HISTORY  Patient ID: Maria C Shrestha is a 58 y.o. year old female   Anemia secondary to chronic kidney disease, Stage III. Patient has not required VALERIE therapy this far.  2. Iron deficiency in the setting of chronic kidney disease, has benefitted from Feraheme. Had Feraheme in January, hemoglobin has  now near normalized at 10.7        Review of Systems   Constitutional: Negative.    HENT: Negative.    Eyes: Negative.    Respiratory: Negative.    Cardiovascular: Negative.    Gastrointestinal: Negative.    Endocrine: Negative.    Genitourinary: Negative.    Musculoskeletal: Negative.    Skin: Negative.    Allergic/Immunologic: Negative.    Neurological: Negative.    Hematological: Negative.    Psychiatric/Behavioral: Negative.             Medications:    Current Outpatient Prescriptions   Medication Sig Dispense Refill   • allopurinol (ZYLOPRIM) 100 MG tablet Take 100 mg by mouth 2 (Two) Times a Day.     • amLODIPine (NORVASC) 5 MG tablet      • aspirin 81 MG tablet Take 81 mg by mouth Daily. Stop 7/22/2018     • busPIRone (BUSPAR) 10 MG tablet buspirone 10 mg tablet   Take 2 tablets twice a day by oral route as needed for 90 days.     • carvedilol (COREG) 3.125 MG tablet Take 3.125 mg by mouth 2 (Two) Times a Day.     • cetirizine (zyrTEC) 10 MG tablet Take 10 mg by mouth As Needed.     • Cholecalciferol (VITAMIN D3) 91036 units tablet Vitamin D2 50,000 unit capsule   Take 1 capsule every week by oral route. Sunday     • cinacalcet (SENSIPAR) 30 MG tablet Take 1 tablet by mouth Daily. 30 tablet 11   • citalopram (CeleXA) 20 MG tablet Take 20 mg by mouth Daily.     • diazePAM (VALIUM) 2 MG tablet Take 2 mg by mouth As Needed for Anxiety.     • fluticasone (VERAMYST) 27.5 MCG/SPRAY nasal spray 2 sprays into each nostril Daily.     • furosemide (LASIX) 40 MG tablet Take 1 tablet by mouth Daily.  "90 tablet 3   • Glucose Blood (BLOOD GLUCOSE TEST) strip Use 4 x daily, use any brand covered by insurance or same brand as before 360 each 3   • HUMULIN R 500 UNIT/ML CONCENTRATED injection USING A U100 SYRINGE DRAW UP TO THE DIRECTED UNIT ROXI AND INJECT FOUR TIMES A DAY (UP TO 30 UNIT ROXI FOUR TIMES A DAY) (Patient taking differently: USING A U100 SYRINGE DRAW UP TO THE DIRECTED UNIT ROXI AND INJECT FOUR TIMES A DAY (UP TO 30 UNIT ROXI FOUR TIMES A DAY)  6 units with meals) 100 mL 3   • ibuprofen (ADVIL,MOTRIN) 800 MG tablet Take 800 mg by mouth Every 8 (Eight) Hours As Needed for Mild Pain .     • insulin glargine (LANTUS) 100 UNIT/ML injection 200 units daily (Patient taking differently: Inject 180 Units under the skin Every Night. 200 units daily) 18 each 3   • Insulin Syringe 31G X 5/16\" 1 ML misc 4 x daily 120 each 11   • lamoTRIgine (LaMICtal) 50 MG tablet dispersible disintegrating tablet Take 50 mg by mouth Every Night.     • Lancets (FREESTYLE) lancets FOUR TIMES A  each 11   • levothyroxine (SYNTHROID, LEVOTHROID) 50 MCG tablet Take 1 tablet by mouth Daily. 90 tablet 1   • lisinopril (PRINIVIL,ZESTRIL) 20 MG tablet Take 20 mg by mouth 2 (Two) Times a Day.     • Multiple Vitamins-Minerals (MULTIVITAMIN ADULT PO) Take 1 tablet by mouth Daily.     • norethindrone (AYGESTIN) 5 MG tablet Take 1 tablet by mouth Daily. 30 tablet 1   • potassium gluconate 595 (99 K) MG tablet tablet potassium gluconate 595 mg (99 mg) tablet   Take 2 tablets every day by oral route.     • rosuvastatin (CRESTOR) 10 MG tablet Take 10 mg by mouth Daily.     • sodium bicarbonate 650 MG tablet Take 650 mg by mouth 3 (Three) Times a Day.     • traMADol (ULTRAM) 50 MG tablet Take 1 tablet by mouth Every 6 (Six) Hours As Needed for Moderate Pain . 8 tablet 0   • TRULICITY 1.5 MG/0.5ML solution pen-injector INJECT 1.5 MG UNDER THE SKIN EVERY 7 DAYS (Patient taking differently: INJECT 1.5 MG UNDER THE SKIN EVERY 7 DAYS Sunday) 6 " mL 3     No current facility-administered medications for this visit.        ALLERGIES:    Allergies   Allergen Reactions   • Codeine Nausea Only       Objective      @VITALS    Current Status 9/19/2018   ECOG score 1       General Appearance: Patient is awake, alert, oriented and in no acute distress. Patient is welldeveloped, wellnourished, and appears stated age.  HEENT: Normocephalic. Sclerae clear, conjunctiva pink, extraocular movements intact, pupils, round, reactive to light and  accommodation. Mouth and throat are clear with moist oral mucosa.  NECK: Supple, no jugular venous distention, thyroid not enlarged.  LYMPH: No cervical, supraclavicular, axillary, or inguinal lymphadenopathy.  CHEST: Equal bilateral expansion, AP  diameter normal, resonant percussion note  LUNGS: Good air movement, no rales, rhonchi, rubs or wheezes with auscultation  CARDIO: Regular sinus rhythm, no murmurs, gallops or rubs.  ABDOMEN: Nondistended, soft, No tenderness, no guarding, no rebound, No hepatosplenomegaly. No abdominal masses. Bowel sounds positive. No hernia  GENITALIA: Not examined.  BREASTS: Not examined.  MUSKEL: No joint swelling, decreased motion, or inflammation  EXTREMS: No edema, clubbing, cyanosis, No varicose veins.  NEURO: Grossly nonfocal, Gait is coordinated and smooth, Cognition is preserved.  SKIN: No rashes, no ecchymoses, no petechia.  PSYCH: Oriented to time, place and person. Memory is preserved. Mood and affect appear normal      RECENT LABS:  Lab on 09/19/2018   Component Date Value Ref Range Status   • Glucose 09/19/2018 156* 70 - 100 mg/dL Final   • BUN 09/19/2018 38* 5 - 21 mg/dL Final   • Creatinine 09/19/2018 2.01* 0.50 - 1.40 mg/dL Final   • Sodium 09/19/2018 144  135 - 145 mmol/L Final   • Potassium 09/19/2018 3.9  3.5 - 5.3 mmol/L Final   • Chloride 09/19/2018 109  98 - 110 mmol/L Final   • CO2 09/19/2018 25.0  24.0 - 31.0 mmol/L Final   • Calcium 09/19/2018 9.1  8.4 - 10.4 mg/dL Final   •  Total Protein 09/19/2018 6.9  6.3 - 8.7 g/dL Final   • Albumin 09/19/2018 3.90  3.50 - 5.00 g/dL Final   • ALT (SGPT) 09/19/2018 20  0 - 54 U/L Final   • AST (SGOT) 09/19/2018 21  7 - 45 U/L Final   • Alkaline Phosphatase 09/19/2018 68  24 - 120 U/L Final   • Total Bilirubin 09/19/2018 0.2  0.1 - 1.0 mg/dL Final   • eGFR Non African Amer 09/19/2018 25* >60 mL/min/1.73 Final   • Globulin 09/19/2018 3.0  gm/dL Final   • A/G Ratio 09/19/2018 1.3  1.1 - 2.5 g/dL Final   • BUN/Creatinine Ratio 09/19/2018 18.9  7.0 - 25.0 Final   • Anion Gap 09/19/2018 10.0  4.0 - 13.0 mmol/L Final   • WBC 09/19/2018 10.28  4.80 - 10.80 10*3/mm3 Final   • RBC 09/19/2018 3.19* 4.20 - 5.40 10*6/mm3 Final   • Hemoglobin 09/19/2018 9.6* 12.0 - 16.0 g/dL Final   • Hematocrit 09/19/2018 29.8* 37.0 - 47.0 % Final   • MCV 09/19/2018 93.4  82.0 - 98.0 fL Final   • MCH 09/19/2018 30.1  28.0 - 32.0 pg Final   • MCHC 09/19/2018 32.2* 33.0 - 36.0 g/dL Final   • RDW 09/19/2018 16.9* 12.0 - 15.0 % Final   • RDW-SD 09/19/2018 58.0* 40.0 - 54.0 fl Final   • MPV 09/19/2018 10.1  6.0 - 12.0 fL Final   • Platelets 09/19/2018 197  130 - 400 10*3/mm3 Final   • Neutrophil % 09/19/2018 74.0  39.0 - 78.0 % Final   • Lymphocyte % 09/19/2018 16.7  15.0 - 45.0 % Final   • Monocyte % 09/19/2018 5.7  4.0 - 12.0 % Final   • Eosinophil % 09/19/2018 2.6  0.0 - 4.0 % Final   • Basophil % 09/19/2018 0.4  0.0 - 2.0 % Final   • Immature Grans % 09/19/2018 0.6  0.0 - 5.0 % Final   • Neutrophils, Absolute 09/19/2018 7.60  1.87 - 8.40 10*3/mm3 Final   • Lymphocytes, Absolute 09/19/2018 1.72  0.72 - 4.86 10*3/mm3 Final   • Monocytes, Absolute 09/19/2018 0.59  0.19 - 1.30 10*3/mm3 Final   • Eosinophils, Absolute 09/19/2018 0.27  0.00 - 0.70 10*3/mm3 Final   • Basophils, Absolute 09/19/2018 0.04  0.00 - 0.20 10*3/mm3 Final   • Immature Grans, Absolute 09/19/2018 0.06* 0.00 - 0.03 10*3/mm3 Final   • nRBC 09/19/2018 0.0  0.0 - 0.0 /100 WBC Final       RADIOLOGY:  No results found.          Assessment/Plan        Anemia secondary to chronic kidney disease, Stage III. .  2. Iron deficiency in the setting of chronic kidney disease, has benefitted from Feraheme. Had Feraheme in January, hemoglobin has  now near normalized at 10.1.  Plan continue watchful waiting return to clinic in 3 months time and check another CBC and iron studies.  If the iron is still low I may give additional intravenous iron.  .    Clinically patient asymptomatic.  I will redraw her iron studies today.  Hemoglobin is a 9.7gm Today. Per CMS guidelines Rx Procrit 40,000 u s/q every month.    She has been bleeding per vaginum and is planned D & C. Heavy set Obese women are more prone to Uterine carcinomas, therefore, will check , 9 especially when she tells me her grand mom had Uterine CA ).  Awaiting D&C tommorow. My cut off for blood xfusion is a Ghb of 7gm%. She is 7.6. Because of Bleeding I fear she may be low in Iron. Will check Iron panel today, if low will infuse Ferraheme and then Rx with Procrit for CKD.  1 wk ago she had HSBO, no malignency. Hgb after 2 doses of Iron upto 9.6, Procrit today.       Marco A Melo MD    9/19/2018    1:49 PM

## 2018-09-20 DIAGNOSIS — D63.1 ANEMIA IN STAGE 3 CHRONIC KIDNEY DISEASE (HCC): ICD-10-CM

## 2018-09-20 DIAGNOSIS — N18.30 ANEMIA IN STAGE 3 CHRONIC KIDNEY DISEASE (HCC): ICD-10-CM

## 2018-10-17 ENCOUNTER — LAB (OUTPATIENT)
Dept: LAB | Facility: HOSPITAL | Age: 58
End: 2018-10-17
Attending: INTERNAL MEDICINE

## 2018-10-17 ENCOUNTER — INFUSION (OUTPATIENT)
Dept: ONCOLOGY | Facility: HOSPITAL | Age: 58
End: 2018-10-17
Attending: INTERNAL MEDICINE

## 2018-10-17 ENCOUNTER — OFFICE VISIT (OUTPATIENT)
Dept: ONCOLOGY | Facility: CLINIC | Age: 58
End: 2018-10-17

## 2018-10-17 VITALS
WEIGHT: 293 LBS | BODY MASS INDEX: 57.52 KG/M2 | SYSTOLIC BLOOD PRESSURE: 145 MMHG | HEIGHT: 60 IN | OXYGEN SATURATION: 99 % | DIASTOLIC BLOOD PRESSURE: 64 MMHG | TEMPERATURE: 98 F | HEART RATE: 72 BPM | RESPIRATION RATE: 20 BRPM

## 2018-10-17 VITALS
BODY MASS INDEX: 57.52 KG/M2 | DIASTOLIC BLOOD PRESSURE: 78 MMHG | SYSTOLIC BLOOD PRESSURE: 158 MMHG | RESPIRATION RATE: 16 BRPM | OXYGEN SATURATION: 98 % | HEART RATE: 76 BPM | WEIGHT: 293 LBS | HEIGHT: 60 IN | TEMPERATURE: 98.6 F

## 2018-10-17 DIAGNOSIS — N18.30 ANEMIA IN STAGE 3 CHRONIC KIDNEY DISEASE (HCC): ICD-10-CM

## 2018-10-17 DIAGNOSIS — Z79.4 TYPE 2 DIABETES MELLITUS WITH STAGE 3 CHRONIC KIDNEY DISEASE, WITH LONG-TERM CURRENT USE OF INSULIN (HCC): Primary | ICD-10-CM

## 2018-10-17 DIAGNOSIS — D63.1 ANEMIA IN STAGE 3 CHRONIC KIDNEY DISEASE (HCC): ICD-10-CM

## 2018-10-17 DIAGNOSIS — D63.1 ANEMIA IN STAGE 3 CHRONIC KIDNEY DISEASE (HCC): Primary | ICD-10-CM

## 2018-10-17 DIAGNOSIS — E11.22 TYPE 2 DIABETES MELLITUS WITH STAGE 3 CHRONIC KIDNEY DISEASE, WITH LONG-TERM CURRENT USE OF INSULIN (HCC): Primary | ICD-10-CM

## 2018-10-17 DIAGNOSIS — N18.30 TYPE 2 DIABETES MELLITUS WITH STAGE 3 CHRONIC KIDNEY DISEASE, WITH LONG-TERM CURRENT USE OF INSULIN (HCC): Primary | ICD-10-CM

## 2018-10-17 DIAGNOSIS — N18.30 ANEMIA IN STAGE 3 CHRONIC KIDNEY DISEASE (HCC): Primary | ICD-10-CM

## 2018-10-17 LAB
ALBUMIN SERPL-MCNC: 3.8 G/DL (ref 3.5–5)
ALBUMIN/GLOB SERPL: 1.2 G/DL (ref 1.1–2.5)
ALP SERPL-CCNC: 97 U/L (ref 24–120)
ALT SERPL W P-5'-P-CCNC: <15 U/L (ref 0–54)
ANION GAP SERPL CALCULATED.3IONS-SCNC: 9 MMOL/L (ref 4–13)
AST SERPL-CCNC: 25 U/L (ref 7–45)
BASOPHILS # BLD AUTO: 0.04 10*3/MM3 (ref 0–0.2)
BASOPHILS NFR BLD AUTO: 0.4 % (ref 0–2)
BILIRUB SERPL-MCNC: 0.2 MG/DL (ref 0.1–1)
BUN BLD-MCNC: 38 MG/DL (ref 5–21)
BUN/CREAT SERPL: 18.6 (ref 7–25)
CALCIUM SPEC-SCNC: 8.9 MG/DL (ref 8.4–10.4)
CHLORIDE SERPL-SCNC: 105 MMOL/L (ref 98–110)
CO2 SERPL-SCNC: 28 MMOL/L (ref 24–31)
CREAT BLD-MCNC: 2.04 MG/DL (ref 0.5–1.4)
DEPRECATED RDW RBC AUTO: 58.5 FL (ref 40–54)
EOSINOPHIL # BLD AUTO: 0.28 10*3/MM3 (ref 0–0.7)
EOSINOPHIL NFR BLD AUTO: 2.7 % (ref 0–4)
ERYTHROCYTE [DISTWIDTH] IN BLOOD BY AUTOMATED COUNT: 17.3 % (ref 12–15)
GFR SERPL CREATININE-BSD FRML MDRD: 25 ML/MIN/1.73
GLOBULIN UR ELPH-MCNC: 3.3 GM/DL
GLUCOSE BLD-MCNC: 224 MG/DL (ref 70–100)
HCT VFR BLD AUTO: 31.6 % (ref 37–47)
HGB BLD-MCNC: 10.2 G/DL (ref 12–16)
HOLD SPECIMEN: NORMAL
HOLD SPECIMEN: NORMAL
IMM GRANULOCYTES # BLD: 0.07 10*3/MM3 (ref 0–0.03)
IMM GRANULOCYTES NFR BLD: 0.7 % (ref 0–5)
LYMPHOCYTES # BLD AUTO: 1.88 10*3/MM3 (ref 0.72–4.86)
LYMPHOCYTES NFR BLD AUTO: 17.9 % (ref 15–45)
MCH RBC QN AUTO: 29.8 PG (ref 28–32)
MCHC RBC AUTO-ENTMCNC: 32.3 G/DL (ref 33–36)
MCV RBC AUTO: 92.4 FL (ref 82–98)
MONOCYTES # BLD AUTO: 0.58 10*3/MM3 (ref 0.19–1.3)
MONOCYTES NFR BLD AUTO: 5.5 % (ref 4–12)
NEUTROPHILS # BLD AUTO: 7.68 10*3/MM3 (ref 1.87–8.4)
NEUTROPHILS NFR BLD AUTO: 72.8 % (ref 39–78)
NRBC BLD MANUAL-RTO: 0 /100 WBC (ref 0–0)
PLATELET # BLD AUTO: 230 10*3/MM3 (ref 130–400)
PMV BLD AUTO: 10.4 FL (ref 6–12)
POTASSIUM BLD-SCNC: 4.2 MMOL/L (ref 3.5–5.3)
PROT SERPL-MCNC: 7.1 G/DL (ref 6.3–8.7)
RBC # BLD AUTO: 3.42 10*6/MM3 (ref 4.2–5.4)
SODIUM BLD-SCNC: 142 MMOL/L (ref 135–145)
WBC NRBC COR # BLD: 10.53 10*3/MM3 (ref 4.8–10.8)

## 2018-10-17 PROCEDURE — 99214 OFFICE O/P EST MOD 30 MIN: CPT | Performed by: INTERNAL MEDICINE

## 2018-10-17 PROCEDURE — 85025 COMPLETE CBC W/AUTO DIFF WBC: CPT

## 2018-10-17 PROCEDURE — 63510000001 EPOETIN ALFA PER 1000 UNITS: Performed by: INTERNAL MEDICINE

## 2018-10-17 PROCEDURE — 36415 COLL VENOUS BLD VENIPUNCTURE: CPT

## 2018-10-17 PROCEDURE — 80053 COMPREHEN METABOLIC PANEL: CPT

## 2018-10-17 PROCEDURE — 96372 THER/PROPH/DIAG INJ SC/IM: CPT

## 2018-10-17 RX ADMIN — ERYTHROPOIETIN 40000 UNITS: 40000 INJECTION, SOLUTION INTRAVENOUS; SUBCUTANEOUS at 14:32

## 2018-10-17 NOTE — PROGRESS NOTES
[facility]  HEMATOLOGY & ONCOLOGY        Subjective     VISIT DIAGNOSIS:   Encounter Diagnoses   Name Primary?   • Anemia in stage 3 chronic kidney disease (CMS/HCC)    • Type 2 diabetes mellitus with stage 3 chronic kidney disease, with long-term current use of insulin (CMS/HCC) Yes       REASON FOR VISIT:     No chief complaint on file.       HEMATOLOGY / ONCOLOGY HISTORY:Ms. Shrestha is a 55 year old female with past medical history of hypertension, chronic kidney disease, diabetes insulindependent  type 2   No history exists.     [No treatment plan]  Cancer Staging Information:  No matching staging information was found for the patient.      INTERVAL HISTORY  Patient ID: Maria C Shrestha is a 58 y.o. year old female   Anemia secondary to chronic kidney disease, Stage III. Patient has not required VALERIE therapy this far.  2. Iron deficiency in the setting of chronic kidney disease, has benefitted from Feraheme. Had Feraheme in January, hemoglobin has  now near normalized at 10.7        Review of Systems   Constitutional: Negative.    HENT: Negative.    Eyes: Negative.    Respiratory: Negative.    Cardiovascular: Negative.    Gastrointestinal: Negative.    Endocrine: Negative.    Genitourinary: Negative.    Musculoskeletal: Negative.    Skin: Negative.    Allergic/Immunologic: Negative.    Neurological: Negative.    Hematological: Negative.    Psychiatric/Behavioral: Negative.             Medications:    Current Outpatient Prescriptions   Medication Sig Dispense Refill   • allopurinol (ZYLOPRIM) 100 MG tablet Take 100 mg by mouth 2 (Two) Times a Day.     • amLODIPine (NORVASC) 5 MG tablet      • aspirin 81 MG tablet Take 81 mg by mouth Daily. Stop 7/22/2018     • busPIRone (BUSPAR) 10 MG tablet buspirone 10 mg tablet   Take 2 tablets twice a day by oral route as needed for 90 days.     • carvedilol (COREG) 3.125 MG tablet Take 3.125 mg by mouth 2 (Two) Times a Day.     • cetirizine (zyrTEC) 10 MG tablet  "Take 10 mg by mouth As Needed.     • Cholecalciferol (VITAMIN D3) 22181 units tablet Vitamin D2 50,000 unit capsule   Take 1 capsule every week by oral route. Sunday     • cinacalcet (SENSIPAR) 30 MG tablet Take 1 tablet by mouth Daily. 30 tablet 11   • citalopram (CeleXA) 20 MG tablet Take 20 mg by mouth Daily.     • diazePAM (VALIUM) 2 MG tablet Take 2 mg by mouth As Needed for Anxiety.     • fluticasone (VERAMYST) 27.5 MCG/SPRAY nasal spray 2 sprays into each nostril Daily.     • furosemide (LASIX) 40 MG tablet Take 1 tablet by mouth Daily. 90 tablet 3   • Glucose Blood (BLOOD GLUCOSE TEST) strip Use 4 x daily, use any brand covered by insurance or same brand as before 360 each 3   • HUMULIN R 500 UNIT/ML CONCENTRATED injection USING A U100 SYRINGE DRAW UP TO THE DIRECTED UNIT ROXI AND INJECT FOUR TIMES A DAY (UP TO 30 UNIT ROXI FOUR TIMES A DAY) (Patient taking differently: USING A U100 SYRINGE DRAW UP TO THE DIRECTED UNIT ROXI AND INJECT FOUR TIMES A DAY (UP TO 30 UNIT ROXI FOUR TIMES A DAY)  6 units with meals) 100 mL 3   • ibuprofen (ADVIL,MOTRIN) 800 MG tablet Take 800 mg by mouth Every 8 (Eight) Hours As Needed for Mild Pain .     • insulin glargine (LANTUS) 100 UNIT/ML injection 200 units daily (Patient taking differently: Inject 180 Units under the skin Every Night. 200 units daily) 18 each 3   • Insulin Syringe 31G X 5/16\" 1 ML misc 4 x daily 120 each 11   • lamoTRIgine (LaMICtal) 50 MG tablet dispersible disintegrating tablet Take 50 mg by mouth Every Night.     • Lancets (FREESTYLE) lancets FOUR TIMES A  each 11   • levothyroxine (SYNTHROID, LEVOTHROID) 50 MCG tablet Take 1 tablet by mouth Daily. 90 tablet 1   • lisinopril (PRINIVIL,ZESTRIL) 20 MG tablet Take 20 mg by mouth 2 (Two) Times a Day.     • Multiple Vitamins-Minerals (MULTIVITAMIN ADULT PO) Take 1 tablet by mouth Daily.     • norethindrone (AYGESTIN) 5 MG tablet Take 1 tablet by mouth Daily. 30 tablet 1   • potassium gluconate 595 (99 " K) MG tablet tablet potassium gluconate 595 mg (99 mg) tablet   Take 2 tablets every day by oral route.     • rosuvastatin (CRESTOR) 10 MG tablet Take 10 mg by mouth Daily.     • sodium bicarbonate 650 MG tablet Take 650 mg by mouth 3 (Three) Times a Day.     • traMADol (ULTRAM) 50 MG tablet Take 1 tablet by mouth Every 6 (Six) Hours As Needed for Moderate Pain . 8 tablet 0   • TRULICITY 1.5 MG/0.5ML solution pen-injector INJECT 1.5 MG UNDER THE SKIN EVERY 7 DAYS (Patient taking differently: INJECT 1.5 MG UNDER THE SKIN EVERY 7 DAYS Sunday) 6 mL 3     No current facility-administered medications for this visit.        ALLERGIES:    Allergies   Allergen Reactions   • Codeine Nausea Only       Objective      @VITALS    Current Status 10/17/2018   ECOG score 1       General Appearance: Patient is awake, alert, oriented and in no acute distress. Patient is welldeveloped, wellnourished, and appears stated age.  HEENT: Normocephalic. Sclerae clear, conjunctiva pink, extraocular movements intact, pupils, round, reactive to light and  accommodation. Mouth and throat are clear with moist oral mucosa.  NECK: Supple, no jugular venous distention, thyroid not enlarged.  LYMPH: No cervical, supraclavicular, axillary, or inguinal lymphadenopathy.  CHEST: Equal bilateral expansion, AP  diameter normal, resonant percussion note  LUNGS: Good air movement, no rales, rhonchi, rubs or wheezes with auscultation  CARDIO: Regular sinus rhythm, no murmurs, gallops or rubs.  ABDOMEN: Nondistended, soft, No tenderness, no guarding, no rebound, No hepatosplenomegaly. No abdominal masses. Bowel sounds positive. No hernia  GENITALIA: Not examined.  BREASTS: Not examined.  MUSKEL: No joint swelling, decreased motion, or inflammation  EXTREMS: No edema, clubbing, cyanosis, No varicose veins.  NEURO: Grossly nonfocal, Gait is coordinated and smooth, Cognition is preserved.  SKIN: No rashes, no ecchymoses, no petechia.  PSYCH: Oriented to time,  place and person. Memory is preserved. Mood and affect appear normal      RECENT LABS:  Lab on 10/17/2018   Component Date Value Ref Range Status   • Glucose 10/17/2018 224* 70 - 100 mg/dL Final   • BUN 10/17/2018 38* 5 - 21 mg/dL Final   • Creatinine 10/17/2018 2.04* 0.50 - 1.40 mg/dL Final   • Sodium 10/17/2018 142  135 - 145 mmol/L Final   • Potassium 10/17/2018 4.2  3.5 - 5.3 mmol/L Final   • Chloride 10/17/2018 105  98 - 110 mmol/L Final   • CO2 10/17/2018 28.0  24.0 - 31.0 mmol/L Final   • Calcium 10/17/2018 8.9  8.4 - 10.4 mg/dL Final   • Total Protein 10/17/2018 7.1  6.3 - 8.7 g/dL Final   • Albumin 10/17/2018 3.80  3.50 - 5.00 g/dL Final   • ALT (SGPT) 10/17/2018 <15  0 - 54 U/L Final   • AST (SGOT) 10/17/2018 25  7 - 45 U/L Final   • Alkaline Phosphatase 10/17/2018 97  24 - 120 U/L Final   • Total Bilirubin 10/17/2018 0.2  0.1 - 1.0 mg/dL Final   • eGFR Non African Amer 10/17/2018 25* >60 mL/min/1.73 Final   • Globulin 10/17/2018 3.3  gm/dL Final   • A/G Ratio 10/17/2018 1.2  1.1 - 2.5 g/dL Final   • BUN/Creatinine Ratio 10/17/2018 18.6  7.0 - 25.0 Final   • Anion Gap 10/17/2018 9.0  4.0 - 13.0 mmol/L Final   • WBC 10/17/2018 10.53  4.80 - 10.80 10*3/mm3 Final   • RBC 10/17/2018 3.42* 4.20 - 5.40 10*6/mm3 Final   • Hemoglobin 10/17/2018 10.2* 12.0 - 16.0 g/dL Final   • Hematocrit 10/17/2018 31.6* 37.0 - 47.0 % Final   • MCV 10/17/2018 92.4  82.0 - 98.0 fL Final   • MCH 10/17/2018 29.8  28.0 - 32.0 pg Final   • MCHC 10/17/2018 32.3* 33.0 - 36.0 g/dL Final   • RDW 10/17/2018 17.3* 12.0 - 15.0 % Final   • RDW-SD 10/17/2018 58.5* 40.0 - 54.0 fl Final   • MPV 10/17/2018 10.4  6.0 - 12.0 fL Final   • Platelets 10/17/2018 230  130 - 400 10*3/mm3 Final   • Neutrophil % 10/17/2018 72.8  39.0 - 78.0 % Final   • Lymphocyte % 10/17/2018 17.9  15.0 - 45.0 % Final   • Monocyte % 10/17/2018 5.5  4.0 - 12.0 % Final   • Eosinophil % 10/17/2018 2.7  0.0 - 4.0 % Final   • Basophil % 10/17/2018 0.4  0.0 - 2.0 % Final   •  Immature Grans % 10/17/2018 0.7  0.0 - 5.0 % Final   • Neutrophils, Absolute 10/17/2018 7.68  1.87 - 8.40 10*3/mm3 Final   • Lymphocytes, Absolute 10/17/2018 1.88  0.72 - 4.86 10*3/mm3 Final   • Monocytes, Absolute 10/17/2018 0.58  0.19 - 1.30 10*3/mm3 Final   • Eosinophils, Absolute 10/17/2018 0.28  0.00 - 0.70 10*3/mm3 Final   • Basophils, Absolute 10/17/2018 0.04  0.00 - 0.20 10*3/mm3 Final   • Immature Grans, Absolute 10/17/2018 0.07* 0.00 - 0.03 10*3/mm3 Final   • nRBC 10/17/2018 0.0  0.0 - 0.0 /100 WBC Final       RADIOLOGY:  No results found.         Assessment/Plan        Anemia secondary to chronic kidney disease, Stage III. Patient has not required VALERIE therapy this far.  2. Iron deficiency in the setting of chronic kidney disease, has benefitted from Feraheme. Had Feraheme in January, hemoglobin has  now near normalized at 10.3.  Plan continue watchful waiting return to clinic in 3 months time and check another CBC and iron studies.  If the iron is still low I may give additional intravenous iron.  Her CMS guidelines I cannot give her Procrit unless hemoglobin is less than 10.    Clinically patient asymptomatic.  I will redraw her iron studies today.  Hemoglobin is a 10.  Return to clinic in 3 months time.            Marco A Melo MD    10/17/2018    1:43 PM             [facility]  HEMATOLOGY & ONCOLOGY        Subjective     VISIT DIAGNOSIS:   Encounter Diagnoses   Name Primary?   • Anemia in stage 3 chronic kidney disease (CMS/HCC)    • Type 2 diabetes mellitus with stage 3 chronic kidney disease, with long-term current use of insulin (CMS/HCC) Yes       REASON FOR VISIT:     No chief complaint on file.       HEMATOLOGY / ONCOLOGY HISTORY:Ms. Shrestha is a 55 year old female with past medical history of hypertension, chronic kidney disease, diabetes insulindependent  type 2   No history exists.     [No treatment plan]  Cancer Staging Information:  No matching staging information was found  for the patient.      INTERVAL HISTORY  Patient ID: Maria C Shrestha is a 58 y.o. year old female   Anemia secondary to chronic kidney disease, Stage III. Patient has not required VALERIE therapy this far.  2. Iron deficiency in the setting of chronic kidney disease, has benefitted from Feraheme. Had Feraheme in January, hemoglobin has  now near normalized at 10.7        Review of Systems   Constitutional: Negative.    HENT: Negative.    Eyes: Negative.    Respiratory: Negative.    Cardiovascular: Negative.    Gastrointestinal: Negative.    Endocrine: Negative.    Genitourinary: Negative.    Musculoskeletal: Negative.    Skin: Negative.    Allergic/Immunologic: Negative.    Neurological: Negative.    Hematological: Negative.    Psychiatric/Behavioral: Negative.             Medications:    Current Outpatient Prescriptions   Medication Sig Dispense Refill   • allopurinol (ZYLOPRIM) 100 MG tablet Take 100 mg by mouth 2 (Two) Times a Day.     • amLODIPine (NORVASC) 5 MG tablet      • aspirin 81 MG tablet Take 81 mg by mouth Daily. Stop 7/22/2018     • busPIRone (BUSPAR) 10 MG tablet buspirone 10 mg tablet   Take 2 tablets twice a day by oral route as needed for 90 days.     • carvedilol (COREG) 3.125 MG tablet Take 3.125 mg by mouth 2 (Two) Times a Day.     • cetirizine (zyrTEC) 10 MG tablet Take 10 mg by mouth As Needed.     • Cholecalciferol (VITAMIN D3) 58413 units tablet Vitamin D2 50,000 unit capsule   Take 1 capsule every week by oral route. Sunday     • cinacalcet (SENSIPAR) 30 MG tablet Take 1 tablet by mouth Daily. 30 tablet 11   • citalopram (CeleXA) 20 MG tablet Take 20 mg by mouth Daily.     • diazePAM (VALIUM) 2 MG tablet Take 2 mg by mouth As Needed for Anxiety.     • fluticasone (VERAMYST) 27.5 MCG/SPRAY nasal spray 2 sprays into each nostril Daily.     • furosemide (LASIX) 40 MG tablet Take 1 tablet by mouth Daily. 90 tablet 3   • Glucose Blood (BLOOD GLUCOSE TEST) strip Use 4 x daily, use any brand covered by  "insurance or same brand as before 360 each 3   • HUMULIN R 500 UNIT/ML CONCENTRATED injection USING A U100 SYRINGE DRAW UP TO THE DIRECTED UNIT ROXI AND INJECT FOUR TIMES A DAY (UP TO 30 UNIT ROXI FOUR TIMES A DAY) (Patient taking differently: USING A U100 SYRINGE DRAW UP TO THE DIRECTED UNIT ROXI AND INJECT FOUR TIMES A DAY (UP TO 30 UNIT ROXI FOUR TIMES A DAY)  6 units with meals) 100 mL 3   • ibuprofen (ADVIL,MOTRIN) 800 MG tablet Take 800 mg by mouth Every 8 (Eight) Hours As Needed for Mild Pain .     • insulin glargine (LANTUS) 100 UNIT/ML injection 200 units daily (Patient taking differently: Inject 180 Units under the skin Every Night. 200 units daily) 18 each 3   • Insulin Syringe 31G X 5/16\" 1 ML misc 4 x daily 120 each 11   • lamoTRIgine (LaMICtal) 50 MG tablet dispersible disintegrating tablet Take 50 mg by mouth Every Night.     • Lancets (FREESTYLE) lancets FOUR TIMES A  each 11   • levothyroxine (SYNTHROID, LEVOTHROID) 50 MCG tablet Take 1 tablet by mouth Daily. 90 tablet 1   • lisinopril (PRINIVIL,ZESTRIL) 20 MG tablet Take 20 mg by mouth 2 (Two) Times a Day.     • Multiple Vitamins-Minerals (MULTIVITAMIN ADULT PO) Take 1 tablet by mouth Daily.     • norethindrone (AYGESTIN) 5 MG tablet Take 1 tablet by mouth Daily. 30 tablet 1   • potassium gluconate 595 (99 K) MG tablet tablet potassium gluconate 595 mg (99 mg) tablet   Take 2 tablets every day by oral route.     • rosuvastatin (CRESTOR) 10 MG tablet Take 10 mg by mouth Daily.     • sodium bicarbonate 650 MG tablet Take 650 mg by mouth 3 (Three) Times a Day.     • traMADol (ULTRAM) 50 MG tablet Take 1 tablet by mouth Every 6 (Six) Hours As Needed for Moderate Pain . 8 tablet 0   • TRULICITY 1.5 MG/0.5ML solution pen-injector INJECT 1.5 MG UNDER THE SKIN EVERY 7 DAYS (Patient taking differently: INJECT 1.5 MG UNDER THE SKIN EVERY 7 DAYS Sunday) 6 mL 3     No current facility-administered medications for this visit.        ALLERGIES:  "   Allergies   Allergen Reactions   • Codeine Nausea Only       Objective      @VITALS    Current Status 10/17/2018   ECOG score 1       General Appearance: Patient is awake, alert, oriented and in no acute distress. Patient is welldeveloped, wellnourished, and appears stated age.  HEENT: Normocephalic. Sclerae clear, conjunctiva pink, extraocular movements intact, pupils, round, reactive to light and  accommodation. Mouth and throat are clear with moist oral mucosa.  NECK: Supple, no jugular venous distention, thyroid not enlarged.  LYMPH: No cervical, supraclavicular, axillary, or inguinal lymphadenopathy.  CHEST: Equal bilateral expansion, AP  diameter normal, resonant percussion note  LUNGS: Good air movement, no rales, rhonchi, rubs or wheezes with auscultation  CARDIO: Regular sinus rhythm, no murmurs, gallops or rubs.  ABDOMEN: Nondistended, soft, No tenderness, no guarding, no rebound, No hepatosplenomegaly. No abdominal masses. Bowel sounds positive. No hernia  GENITALIA: Not examined.  BREASTS: Not examined.  MUSKEL: No joint swelling, decreased motion, or inflammation  EXTREMS: No edema, clubbing, cyanosis, No varicose veins.  NEURO: Grossly nonfocal, Gait is coordinated and smooth, Cognition is preserved.  SKIN: No rashes, no ecchymoses, no petechia.  PSYCH: Oriented to time, place and person. Memory is preserved. Mood and affect appear normal      RECENT LABS:  Lab on 10/17/2018   Component Date Value Ref Range Status   • Glucose 10/17/2018 224* 70 - 100 mg/dL Final   • BUN 10/17/2018 38* 5 - 21 mg/dL Final   • Creatinine 10/17/2018 2.04* 0.50 - 1.40 mg/dL Final   • Sodium 10/17/2018 142  135 - 145 mmol/L Final   • Potassium 10/17/2018 4.2  3.5 - 5.3 mmol/L Final   • Chloride 10/17/2018 105  98 - 110 mmol/L Final   • CO2 10/17/2018 28.0  24.0 - 31.0 mmol/L Final   • Calcium 10/17/2018 8.9  8.4 - 10.4 mg/dL Final   • Total Protein 10/17/2018 7.1  6.3 - 8.7 g/dL Final   • Albumin 10/17/2018 3.80  3.50 -  5.00 g/dL Final   • ALT (SGPT) 10/17/2018 <15  0 - 54 U/L Final   • AST (SGOT) 10/17/2018 25  7 - 45 U/L Final   • Alkaline Phosphatase 10/17/2018 97  24 - 120 U/L Final   • Total Bilirubin 10/17/2018 0.2  0.1 - 1.0 mg/dL Final   • eGFR Non African Amer 10/17/2018 25* >60 mL/min/1.73 Final   • Globulin 10/17/2018 3.3  gm/dL Final   • A/G Ratio 10/17/2018 1.2  1.1 - 2.5 g/dL Final   • BUN/Creatinine Ratio 10/17/2018 18.6  7.0 - 25.0 Final   • Anion Gap 10/17/2018 9.0  4.0 - 13.0 mmol/L Final   • WBC 10/17/2018 10.53  4.80 - 10.80 10*3/mm3 Final   • RBC 10/17/2018 3.42* 4.20 - 5.40 10*6/mm3 Final   • Hemoglobin 10/17/2018 10.2* 12.0 - 16.0 g/dL Final   • Hematocrit 10/17/2018 31.6* 37.0 - 47.0 % Final   • MCV 10/17/2018 92.4  82.0 - 98.0 fL Final   • MCH 10/17/2018 29.8  28.0 - 32.0 pg Final   • MCHC 10/17/2018 32.3* 33.0 - 36.0 g/dL Final   • RDW 10/17/2018 17.3* 12.0 - 15.0 % Final   • RDW-SD 10/17/2018 58.5* 40.0 - 54.0 fl Final   • MPV 10/17/2018 10.4  6.0 - 12.0 fL Final   • Platelets 10/17/2018 230  130 - 400 10*3/mm3 Final   • Neutrophil % 10/17/2018 72.8  39.0 - 78.0 % Final   • Lymphocyte % 10/17/2018 17.9  15.0 - 45.0 % Final   • Monocyte % 10/17/2018 5.5  4.0 - 12.0 % Final   • Eosinophil % 10/17/2018 2.7  0.0 - 4.0 % Final   • Basophil % 10/17/2018 0.4  0.0 - 2.0 % Final   • Immature Grans % 10/17/2018 0.7  0.0 - 5.0 % Final   • Neutrophils, Absolute 10/17/2018 7.68  1.87 - 8.40 10*3/mm3 Final   • Lymphocytes, Absolute 10/17/2018 1.88  0.72 - 4.86 10*3/mm3 Final   • Monocytes, Absolute 10/17/2018 0.58  0.19 - 1.30 10*3/mm3 Final   • Eosinophils, Absolute 10/17/2018 0.28  0.00 - 0.70 10*3/mm3 Final   • Basophils, Absolute 10/17/2018 0.04  0.00 - 0.20 10*3/mm3 Final   • Immature Grans, Absolute 10/17/2018 0.07* 0.00 - 0.03 10*3/mm3 Final   • nRBC 10/17/2018 0.0  0.0 - 0.0 /100 WBC Final       RADIOLOGY:  No results found.         Assessment/Plan        Anemia secondary to chronic kidney disease, Stage III.  Patient has not required VALERIE therapy this far.  2. Iron deficiency in the setting of chronic kidney disease, has benefitted from Feraheme. Had Feraheme in January, hemoglobin has  now near normalized at 10.2.  Plan continue watchful waiting return to clinic in 3 months time and check another CBC and iron studies.  If the iron is still low I may give additional intravenous iron.  .    Clinically patient asymptomatic.  I will redraw her iron studies today.  Hemoglobin is a 10.3gm Today. Per CMS guidelines Rx Procrit 40,000 u s/q every month.       Marco A Melo MD    10/17/2018    1:43 PM             [facility]  HEMATOLOGY & ONCOLOGY        Subjective     VISIT DIAGNOSIS:   Encounter Diagnoses   Name Primary?   • Anemia in stage 3 chronic kidney disease (CMS/HCC)    • Type 2 diabetes mellitus with stage 3 chronic kidney disease, with long-term current use of insulin (CMS/HCC) Yes       REASON FOR VISIT:     No chief complaint on file.       HEMATOLOGY / ONCOLOGY HISTORY:Ms. Shrestha is a 55 year old female with past medical history of hypertension, chronic kidney disease, diabetes insulindependent  type 2   No history exists.     [No treatment plan]  Cancer Staging Information:  No matching staging information was found for the patient.      INTERVAL HISTORY  Patient ID: Maria C Shrestha is a 58 y.o. year old female   Anemia secondary to chronic kidney disease, Stage III. Patient has not required VALERIE therapy this far.  2. Iron deficiency in the setting of chronic kidney disease, has benefitted from Feraheme. Had Feraheme in January, hemoglobin has  now near normalized at 10.7        Review of Systems   Constitutional: Negative.    HENT: Negative.    Eyes: Negative.    Respiratory: Negative.    Cardiovascular: Negative.    Gastrointestinal: Negative.    Endocrine: Negative.    Genitourinary: Negative.    Musculoskeletal: Negative.    Skin: Negative.    Allergic/Immunologic: Negative.    Neurological:  Negative.    Hematological: Negative.    Psychiatric/Behavioral: Negative.             Medications:    Current Outpatient Prescriptions   Medication Sig Dispense Refill   • allopurinol (ZYLOPRIM) 100 MG tablet Take 100 mg by mouth 2 (Two) Times a Day.     • amLODIPine (NORVASC) 5 MG tablet      • aspirin 81 MG tablet Take 81 mg by mouth Daily. Stop 7/22/2018     • busPIRone (BUSPAR) 10 MG tablet buspirone 10 mg tablet   Take 2 tablets twice a day by oral route as needed for 90 days.     • carvedilol (COREG) 3.125 MG tablet Take 3.125 mg by mouth 2 (Two) Times a Day.     • cetirizine (zyrTEC) 10 MG tablet Take 10 mg by mouth As Needed.     • Cholecalciferol (VITAMIN D3) 99925 units tablet Vitamin D2 50,000 unit capsule   Take 1 capsule every week by oral route. Sunday     • cinacalcet (SENSIPAR) 30 MG tablet Take 1 tablet by mouth Daily. 30 tablet 11   • citalopram (CeleXA) 20 MG tablet Take 20 mg by mouth Daily.     • diazePAM (VALIUM) 2 MG tablet Take 2 mg by mouth As Needed for Anxiety.     • fluticasone (VERAMYST) 27.5 MCG/SPRAY nasal spray 2 sprays into each nostril Daily.     • furosemide (LASIX) 40 MG tablet Take 1 tablet by mouth Daily. 90 tablet 3   • Glucose Blood (BLOOD GLUCOSE TEST) strip Use 4 x daily, use any brand covered by insurance or same brand as before 360 each 3   • HUMULIN R 500 UNIT/ML CONCENTRATED injection USING A U100 SYRINGE DRAW UP TO THE DIRECTED UNIT ROXI AND INJECT FOUR TIMES A DAY (UP TO 30 UNIT ROXI FOUR TIMES A DAY) (Patient taking differently: USING A U100 SYRINGE DRAW UP TO THE DIRECTED UNIT ROXI AND INJECT FOUR TIMES A DAY (UP TO 30 UNIT ROXI FOUR TIMES A DAY)  6 units with meals) 100 mL 3   • ibuprofen (ADVIL,MOTRIN) 800 MG tablet Take 800 mg by mouth Every 8 (Eight) Hours As Needed for Mild Pain .     • insulin glargine (LANTUS) 100 UNIT/ML injection 200 units daily (Patient taking differently: Inject 180 Units under the skin Every Night. 200 units daily) 18 each 3   • Insulin  "Syringe 31G X 5/16\" 1 ML misc 4 x daily 120 each 11   • lamoTRIgine (LaMICtal) 50 MG tablet dispersible disintegrating tablet Take 50 mg by mouth Every Night.     • Lancets (FREESTYLE) lancets FOUR TIMES A  each 11   • levothyroxine (SYNTHROID, LEVOTHROID) 50 MCG tablet Take 1 tablet by mouth Daily. 90 tablet 1   • lisinopril (PRINIVIL,ZESTRIL) 20 MG tablet Take 20 mg by mouth 2 (Two) Times a Day.     • Multiple Vitamins-Minerals (MULTIVITAMIN ADULT PO) Take 1 tablet by mouth Daily.     • norethindrone (AYGESTIN) 5 MG tablet Take 1 tablet by mouth Daily. 30 tablet 1   • potassium gluconate 595 (99 K) MG tablet tablet potassium gluconate 595 mg (99 mg) tablet   Take 2 tablets every day by oral route.     • rosuvastatin (CRESTOR) 10 MG tablet Take 10 mg by mouth Daily.     • sodium bicarbonate 650 MG tablet Take 650 mg by mouth 3 (Three) Times a Day.     • traMADol (ULTRAM) 50 MG tablet Take 1 tablet by mouth Every 6 (Six) Hours As Needed for Moderate Pain . 8 tablet 0   • TRULICITY 1.5 MG/0.5ML solution pen-injector INJECT 1.5 MG UNDER THE SKIN EVERY 7 DAYS (Patient taking differently: INJECT 1.5 MG UNDER THE SKIN EVERY 7 DAYS Sunday) 6 mL 3     No current facility-administered medications for this visit.        ALLERGIES:    Allergies   Allergen Reactions   • Codeine Nausea Only       Objective      @VITALS    Current Status 10/17/2018   ECOG score 1       General Appearance: Patient is awake, alert, oriented and in no acute distress. Patient is welldeveloped, wellnourished, and appears stated age.  HEENT: Normocephalic. Sclerae clear, conjunctiva pink, extraocular movements intact, pupils, round, reactive to light and  accommodation. Mouth and throat are clear with moist oral mucosa.  NECK: Supple, no jugular venous distention, thyroid not enlarged.  LYMPH: No cervical, supraclavicular, axillary, or inguinal lymphadenopathy.  CHEST: Equal bilateral expansion, AP  diameter normal, resonant percussion " note  LUNGS: Good air movement, no rales, rhonchi, rubs or wheezes with auscultation  CARDIO: Regular sinus rhythm, no murmurs, gallops or rubs.  ABDOMEN: Nondistended, soft, No tenderness, no guarding, no rebound, No hepatosplenomegaly. No abdominal masses. Bowel sounds positive. No hernia  GENITALIA: Not examined.  BREASTS: Not examined.  MUSKEL: No joint swelling, decreased motion, or inflammation  EXTREMS: No edema, clubbing, cyanosis, No varicose veins.  NEURO: Grossly nonfocal, Gait is coordinated and smooth, Cognition is preserved.  SKIN: No rashes, no ecchymoses, no petechia.  PSYCH: Oriented to time, place and person. Memory is preserved. Mood and affect appear normal      RECENT LABS:  Lab on 10/17/2018   Component Date Value Ref Range Status   • Glucose 10/17/2018 224* 70 - 100 mg/dL Final   • BUN 10/17/2018 38* 5 - 21 mg/dL Final   • Creatinine 10/17/2018 2.04* 0.50 - 1.40 mg/dL Final   • Sodium 10/17/2018 142  135 - 145 mmol/L Final   • Potassium 10/17/2018 4.2  3.5 - 5.3 mmol/L Final   • Chloride 10/17/2018 105  98 - 110 mmol/L Final   • CO2 10/17/2018 28.0  24.0 - 31.0 mmol/L Final   • Calcium 10/17/2018 8.9  8.4 - 10.4 mg/dL Final   • Total Protein 10/17/2018 7.1  6.3 - 8.7 g/dL Final   • Albumin 10/17/2018 3.80  3.50 - 5.00 g/dL Final   • ALT (SGPT) 10/17/2018 <15  0 - 54 U/L Final   • AST (SGOT) 10/17/2018 25  7 - 45 U/L Final   • Alkaline Phosphatase 10/17/2018 97  24 - 120 U/L Final   • Total Bilirubin 10/17/2018 0.2  0.1 - 1.0 mg/dL Final   • eGFR Non African Amer 10/17/2018 25* >60 mL/min/1.73 Final   • Globulin 10/17/2018 3.3  gm/dL Final   • A/G Ratio 10/17/2018 1.2  1.1 - 2.5 g/dL Final   • BUN/Creatinine Ratio 10/17/2018 18.6  7.0 - 25.0 Final   • Anion Gap 10/17/2018 9.0  4.0 - 13.0 mmol/L Final   • WBC 10/17/2018 10.53  4.80 - 10.80 10*3/mm3 Final   • RBC 10/17/2018 3.42* 4.20 - 5.40 10*6/mm3 Final   • Hemoglobin 10/17/2018 10.2* 12.0 - 16.0 g/dL Final   • Hematocrit 10/17/2018 31.6* 37.0  - 47.0 % Final   • MCV 10/17/2018 92.4  82.0 - 98.0 fL Final   • MCH 10/17/2018 29.8  28.0 - 32.0 pg Final   • MCHC 10/17/2018 32.3* 33.0 - 36.0 g/dL Final   • RDW 10/17/2018 17.3* 12.0 - 15.0 % Final   • RDW-SD 10/17/2018 58.5* 40.0 - 54.0 fl Final   • MPV 10/17/2018 10.4  6.0 - 12.0 fL Final   • Platelets 10/17/2018 230  130 - 400 10*3/mm3 Final   • Neutrophil % 10/17/2018 72.8  39.0 - 78.0 % Final   • Lymphocyte % 10/17/2018 17.9  15.0 - 45.0 % Final   • Monocyte % 10/17/2018 5.5  4.0 - 12.0 % Final   • Eosinophil % 10/17/2018 2.7  0.0 - 4.0 % Final   • Basophil % 10/17/2018 0.4  0.0 - 2.0 % Final   • Immature Grans % 10/17/2018 0.7  0.0 - 5.0 % Final   • Neutrophils, Absolute 10/17/2018 7.68  1.87 - 8.40 10*3/mm3 Final   • Lymphocytes, Absolute 10/17/2018 1.88  0.72 - 4.86 10*3/mm3 Final   • Monocytes, Absolute 10/17/2018 0.58  0.19 - 1.30 10*3/mm3 Final   • Eosinophils, Absolute 10/17/2018 0.28  0.00 - 0.70 10*3/mm3 Final   • Basophils, Absolute 10/17/2018 0.04  0.00 - 0.20 10*3/mm3 Final   • Immature Grans, Absolute 10/17/2018 0.07* 0.00 - 0.03 10*3/mm3 Final   • nRBC 10/17/2018 0.0  0.0 - 0.0 /100 WBC Final       RADIOLOGY:  No results found.         Assessment/Plan        Anemia secondary to chronic kidney disease, Stage III. Patient has not required VALERIE therapy this far.  2. Iron deficiency in the setting of chronic kidney disease, has benefitted from Feraheme. Had Feraheme in January, hemoglobin has  now near normalized at 10.3.  Plan continue watchful waiting return to clinic in 3 months time and check another CBC and iron studies.  If the iron is still low I may give additional intravenous iron.  Her CMS guidelines I cannot give her Procrit unless hemoglobin is less than 10.    Clinically patient asymptomatic.  I will redraw her iron studies today.  Hemoglobin is a 10.  Return to clinic in 3 months time.            Marco A Melo MD    10/17/2018    1:43 PM             [facility]  GROUP  HEMATOLOGY & ONCOLOGY        Subjective     VISIT DIAGNOSIS:   Encounter Diagnoses   Name Primary?   • Anemia in stage 3 chronic kidney disease (CMS/HCC)    • Type 2 diabetes mellitus with stage 3 chronic kidney disease, with long-term current use of insulin (CMS/HCC) Yes       REASON FOR VISIT:     No chief complaint on file.       HEMATOLOGY / ONCOLOGY HISTORY:Ms. Shrestha is a 55 year old female with past medical history of hypertension, chronic kidney disease, diabetes insulindependent  type 2   No history exists.     [No treatment plan]  Cancer Staging Information:  No matching staging information was found for the patient.      INTERVAL HISTORY  Patient ID: Maria C Shrestha is a 58 y.o. year old female   Anemia secondary to chronic kidney disease, Stage III. Patient has not required VALERIE therapy this far.  2. Iron deficiency in the setting of chronic kidney disease, has benefitted from Feraheme. Had Feraheme in January, hemoglobin has  now near normalized at 10.7        Review of Systems   Constitutional: Negative.    HENT: Negative.    Eyes: Negative.    Respiratory: Negative.    Cardiovascular: Negative.    Gastrointestinal: Negative.    Endocrine: Negative.    Genitourinary: Negative.    Musculoskeletal: Negative.    Skin: Negative.    Allergic/Immunologic: Negative.    Neurological: Negative.    Hematological: Negative.    Psychiatric/Behavioral: Negative.             Medications:    Current Outpatient Prescriptions   Medication Sig Dispense Refill   • allopurinol (ZYLOPRIM) 100 MG tablet Take 100 mg by mouth 2 (Two) Times a Day.     • amLODIPine (NORVASC) 5 MG tablet      • aspirin 81 MG tablet Take 81 mg by mouth Daily. Stop 7/22/2018     • busPIRone (BUSPAR) 10 MG tablet buspirone 10 mg tablet   Take 2 tablets twice a day by oral route as needed for 90 days.     • carvedilol (COREG) 3.125 MG tablet Take 3.125 mg by mouth 2 (Two) Times a Day.     • cetirizine (zyrTEC) 10 MG tablet Take 10 mg by mouth As  "Needed.     • Cholecalciferol (VITAMIN D3) 84812 units tablet Vitamin D2 50,000 unit capsule   Take 1 capsule every week by oral route. Sunday     • cinacalcet (SENSIPAR) 30 MG tablet Take 1 tablet by mouth Daily. 30 tablet 11   • citalopram (CeleXA) 20 MG tablet Take 20 mg by mouth Daily.     • diazePAM (VALIUM) 2 MG tablet Take 2 mg by mouth As Needed for Anxiety.     • fluticasone (VERAMYST) 27.5 MCG/SPRAY nasal spray 2 sprays into each nostril Daily.     • furosemide (LASIX) 40 MG tablet Take 1 tablet by mouth Daily. 90 tablet 3   • Glucose Blood (BLOOD GLUCOSE TEST) strip Use 4 x daily, use any brand covered by insurance or same brand as before 360 each 3   • HUMULIN R 500 UNIT/ML CONCENTRATED injection USING A U100 SYRINGE DRAW UP TO THE DIRECTED UNIT ROXI AND INJECT FOUR TIMES A DAY (UP TO 30 UNIT ROXI FOUR TIMES A DAY) (Patient taking differently: USING A U100 SYRINGE DRAW UP TO THE DIRECTED UNIT ROXI AND INJECT FOUR TIMES A DAY (UP TO 30 UNIT ROXI FOUR TIMES A DAY)  6 units with meals) 100 mL 3   • ibuprofen (ADVIL,MOTRIN) 800 MG tablet Take 800 mg by mouth Every 8 (Eight) Hours As Needed for Mild Pain .     • insulin glargine (LANTUS) 100 UNIT/ML injection 200 units daily (Patient taking differently: Inject 180 Units under the skin Every Night. 200 units daily) 18 each 3   • Insulin Syringe 31G X 5/16\" 1 ML misc 4 x daily 120 each 11   • lamoTRIgine (LaMICtal) 50 MG tablet dispersible disintegrating tablet Take 50 mg by mouth Every Night.     • Lancets (FREESTYLE) lancets FOUR TIMES A  each 11   • levothyroxine (SYNTHROID, LEVOTHROID) 50 MCG tablet Take 1 tablet by mouth Daily. 90 tablet 1   • lisinopril (PRINIVIL,ZESTRIL) 20 MG tablet Take 20 mg by mouth 2 (Two) Times a Day.     • Multiple Vitamins-Minerals (MULTIVITAMIN ADULT PO) Take 1 tablet by mouth Daily.     • norethindrone (AYGESTIN) 5 MG tablet Take 1 tablet by mouth Daily. 30 tablet 1   • potassium gluconate 595 (99 K) MG tablet tablet " potassium gluconate 595 mg (99 mg) tablet   Take 2 tablets every day by oral route.     • rosuvastatin (CRESTOR) 10 MG tablet Take 10 mg by mouth Daily.     • sodium bicarbonate 650 MG tablet Take 650 mg by mouth 3 (Three) Times a Day.     • traMADol (ULTRAM) 50 MG tablet Take 1 tablet by mouth Every 6 (Six) Hours As Needed for Moderate Pain . 8 tablet 0   • TRULICITY 1.5 MG/0.5ML solution pen-injector INJECT 1.5 MG UNDER THE SKIN EVERY 7 DAYS (Patient taking differently: INJECT 1.5 MG UNDER THE SKIN EVERY 7 DAYS Sunday) 6 mL 3     No current facility-administered medications for this visit.        ALLERGIES:    Allergies   Allergen Reactions   • Codeine Nausea Only       Objective      @VITALS    Current Status 10/17/2018   ECOG score 1       General Appearance: Patient is awake, alert, oriented and in no acute distress. Patient is welldeveloped, wellnourished, and appears stated age.  HEENT: Normocephalic. Sclerae clear, conjunctiva pink, extraocular movements intact, pupils, round, reactive to light and  accommodation. Mouth and throat are clear with moist oral mucosa.  NECK: Supple, no jugular venous distention, thyroid not enlarged.  LYMPH: No cervical, supraclavicular, axillary, or inguinal lymphadenopathy.  CHEST: Equal bilateral expansion, AP  diameter normal, resonant percussion note  LUNGS: Good air movement, no rales, rhonchi, rubs or wheezes with auscultation  CARDIO: Regular sinus rhythm, no murmurs, gallops or rubs.  ABDOMEN: Nondistended, soft, No tenderness, no guarding, no rebound, No hepatosplenomegaly. No abdominal masses. Bowel sounds positive. No hernia  GENITALIA: Not examined.  BREASTS: Not examined.  MUSKEL: No joint swelling, decreased motion, or inflammation  EXTREMS: No edema, clubbing, cyanosis, No varicose veins.  NEURO: Grossly nonfocal, Gait is coordinated and smooth, Cognition is preserved.  SKIN: No rashes, no ecchymoses, no petechia.  PSYCH: Oriented to time, place and person.  Memory is preserved. Mood and affect appear normal      RECENT LABS:  Lab on 10/17/2018   Component Date Value Ref Range Status   • Glucose 10/17/2018 224* 70 - 100 mg/dL Final   • BUN 10/17/2018 38* 5 - 21 mg/dL Final   • Creatinine 10/17/2018 2.04* 0.50 - 1.40 mg/dL Final   • Sodium 10/17/2018 142  135 - 145 mmol/L Final   • Potassium 10/17/2018 4.2  3.5 - 5.3 mmol/L Final   • Chloride 10/17/2018 105  98 - 110 mmol/L Final   • CO2 10/17/2018 28.0  24.0 - 31.0 mmol/L Final   • Calcium 10/17/2018 8.9  8.4 - 10.4 mg/dL Final   • Total Protein 10/17/2018 7.1  6.3 - 8.7 g/dL Final   • Albumin 10/17/2018 3.80  3.50 - 5.00 g/dL Final   • ALT (SGPT) 10/17/2018 <15  0 - 54 U/L Final   • AST (SGOT) 10/17/2018 25  7 - 45 U/L Final   • Alkaline Phosphatase 10/17/2018 97  24 - 120 U/L Final   • Total Bilirubin 10/17/2018 0.2  0.1 - 1.0 mg/dL Final   • eGFR Non African Amer 10/17/2018 25* >60 mL/min/1.73 Final   • Globulin 10/17/2018 3.3  gm/dL Final   • A/G Ratio 10/17/2018 1.2  1.1 - 2.5 g/dL Final   • BUN/Creatinine Ratio 10/17/2018 18.6  7.0 - 25.0 Final   • Anion Gap 10/17/2018 9.0  4.0 - 13.0 mmol/L Final   • WBC 10/17/2018 10.53  4.80 - 10.80 10*3/mm3 Final   • RBC 10/17/2018 3.42* 4.20 - 5.40 10*6/mm3 Final   • Hemoglobin 10/17/2018 10.2* 12.0 - 16.0 g/dL Final   • Hematocrit 10/17/2018 31.6* 37.0 - 47.0 % Final   • MCV 10/17/2018 92.4  82.0 - 98.0 fL Final   • MCH 10/17/2018 29.8  28.0 - 32.0 pg Final   • MCHC 10/17/2018 32.3* 33.0 - 36.0 g/dL Final   • RDW 10/17/2018 17.3* 12.0 - 15.0 % Final   • RDW-SD 10/17/2018 58.5* 40.0 - 54.0 fl Final   • MPV 10/17/2018 10.4  6.0 - 12.0 fL Final   • Platelets 10/17/2018 230  130 - 400 10*3/mm3 Final   • Neutrophil % 10/17/2018 72.8  39.0 - 78.0 % Final   • Lymphocyte % 10/17/2018 17.9  15.0 - 45.0 % Final   • Monocyte % 10/17/2018 5.5  4.0 - 12.0 % Final   • Eosinophil % 10/17/2018 2.7  0.0 - 4.0 % Final   • Basophil % 10/17/2018 0.4  0.0 - 2.0 % Final   • Immature Grans %  10/17/2018 0.7  0.0 - 5.0 % Final   • Neutrophils, Absolute 10/17/2018 7.68  1.87 - 8.40 10*3/mm3 Final   • Lymphocytes, Absolute 10/17/2018 1.88  0.72 - 4.86 10*3/mm3 Final   • Monocytes, Absolute 10/17/2018 0.58  0.19 - 1.30 10*3/mm3 Final   • Eosinophils, Absolute 10/17/2018 0.28  0.00 - 0.70 10*3/mm3 Final   • Basophils, Absolute 10/17/2018 0.04  0.00 - 0.20 10*3/mm3 Final   • Immature Grans, Absolute 10/17/2018 0.07* 0.00 - 0.03 10*3/mm3 Final   • nRBC 10/17/2018 0.0  0.0 - 0.0 /100 WBC Final       RADIOLOGY:  No results found.         Assessment/Plan        Anemia secondary to chronic kidney disease, Stage III. .  2. Iron deficiency in the setting of chronic kidney disease, has benefitted from Feraheme. Had Feraheme in January, hemoglobin has  now near normalized at 10.1.  Plan continue watchful waiting return to clinic in 3 months time and check another CBC and iron studies.  If the iron is still low I may give additional intravenous iron.  .    Clinically patient asymptomatic.  I will redraw her iron studies today.  Hemoglobin is a 9.7gm Today. Per CMS guidelines Rx Procrit 40,000 u s/q every month.    She has been bleeding per vaginum and is planned D & C. Heavy set Obese women are more prone to Uterine carcinomas, therefore, will check , 9 especially when she tells me her grand mom had Uterine CA ).  Awaiting D&C tommorow. My cut off for blood xfusion is a Ghb of 7gm%. She is 7.6. Because of Bleeding I fear she may be low in Iron. Will check Iron panel today, if low will infuse Ferraheme and then Rx with Procrit for CKD.  1 wk ago she had HSBO, no malignency. Hgb after 2 doses of Iron upto 10.2, Procrit today.       Marco A Melo MD    10/17/2018    1:43 PM

## 2018-10-25 ENCOUNTER — APPOINTMENT (OUTPATIENT)
Dept: LAB | Facility: HOSPITAL | Age: 58
End: 2018-10-25

## 2018-10-25 LAB
25(OH)D3 SERPL-MCNC: 31.5 NG/ML (ref 30–100)
ALBUMIN SERPL-MCNC: 4 G/DL (ref 3.5–5)
ALBUMIN/GLOB SERPL: 1.2 G/DL (ref 1.1–2.5)
ALP SERPL-CCNC: 91 U/L (ref 24–120)
ALT SERPL W P-5'-P-CCNC: 23 U/L (ref 0–54)
ANION GAP SERPL CALCULATED.3IONS-SCNC: 11 MMOL/L (ref 4–13)
AST SERPL-CCNC: 22 U/L (ref 7–45)
AUTO MIXED CELLS #: 0.5 10*3/MM3 (ref 0.1–2.6)
AUTO MIXED CELLS %: 4.7 % (ref 0.1–24)
BILIRUB SERPL-MCNC: 0.4 MG/DL (ref 0.1–1)
BUN BLD-MCNC: 33 MG/DL (ref 5–21)
BUN/CREAT SERPL: 17.2
CALCIUM SPEC-SCNC: 8.7 MG/DL (ref 8.4–10.4)
CHLORIDE SERPL-SCNC: 106 MMOL/L (ref 98–110)
CHOLEST SERPL-MCNC: 173 MG/DL (ref 130–200)
CO2 SERPL-SCNC: 24 MMOL/L (ref 24–31)
CREAT BLD-MCNC: 1.92 MG/DL (ref 0.5–1.4)
ERYTHROCYTE [DISTWIDTH] IN BLOOD BY AUTOMATED COUNT: 18.2 % (ref 12–15)
GFR SERPL CREATININE-BSD FRML MDRD: 27 ML/MIN/1.73
GLOBULIN UR ELPH-MCNC: 3.3 GM/DL
GLUCOSE BLD-MCNC: 162 MG/DL (ref 70–100)
HBA1C MFR BLD: 7 %
HCT VFR BLD AUTO: 32.4 % (ref 37–47)
HDLC SERPL-MCNC: 37 MG/DL
HGB BLD-MCNC: 10.5 G/DL (ref 12–16)
LDLC SERPL CALC-MCNC: 64 MG/DL (ref 0–99)
LDLC/HDLC SERPL: 1.72 {RATIO}
LYMPHOCYTES # BLD AUTO: 1.7 10*3/MM3 (ref 0.8–7)
LYMPHOCYTES NFR BLD AUTO: 15.1 % (ref 15–45)
MCH RBC QN AUTO: 29.2 PG (ref 28–32)
MCHC RBC AUTO-ENTMCNC: 32.4 G/DL (ref 33–36)
MCV RBC AUTO: 90 FL (ref 82–98)
NEUTROPHILS # BLD AUTO: 8.8 10*3/MM3 (ref 1.5–8.3)
NEUTROPHILS NFR BLD AUTO: 80.2 % (ref 39–78)
PLATELET # BLD AUTO: 257 10*3/MM3 (ref 130–400)
PMV BLD AUTO: 9.3 FL (ref 6–12)
POTASSIUM BLD-SCNC: 4 MMOL/L (ref 3.5–5.3)
PROT SERPL-MCNC: 7.3 G/DL (ref 6.3–8.7)
RBC # BLD AUTO: 3.6 10*6/MM3 (ref 4.2–5.4)
SODIUM BLD-SCNC: 141 MMOL/L (ref 135–145)
TRIGL SERPL-MCNC: 362 MG/DL (ref 0–149)
TSH SERPL DL<=0.05 MIU/L-ACNC: 3 MIU/ML (ref 0.47–4.68)
VIT B12 BLD-MCNC: 753 PG/ML (ref 239–931)
VLDLC SERPL-MCNC: 72.4 MG/DL
WBC NRBC COR # BLD: 11 10*3/MM3 (ref 4.8–10.8)

## 2018-10-25 PROCEDURE — 83036 HEMOGLOBIN GLYCOSYLATED A1C: CPT | Performed by: INTERNAL MEDICINE

## 2018-10-25 PROCEDURE — 80061 LIPID PANEL: CPT | Performed by: INTERNAL MEDICINE

## 2018-10-25 PROCEDURE — 36415 COLL VENOUS BLD VENIPUNCTURE: CPT | Performed by: INTERNAL MEDICINE

## 2018-10-25 PROCEDURE — 82043 UR ALBUMIN QUANTITATIVE: CPT | Performed by: INTERNAL MEDICINE

## 2018-10-25 PROCEDURE — 80053 COMPREHEN METABOLIC PANEL: CPT | Performed by: INTERNAL MEDICINE

## 2018-10-25 PROCEDURE — 82306 VITAMIN D 25 HYDROXY: CPT | Performed by: INTERNAL MEDICINE

## 2018-10-25 PROCEDURE — 85025 COMPLETE CBC W/AUTO DIFF WBC: CPT | Performed by: INTERNAL MEDICINE

## 2018-10-25 PROCEDURE — 84443 ASSAY THYROID STIM HORMONE: CPT | Performed by: INTERNAL MEDICINE

## 2018-10-25 PROCEDURE — 82570 ASSAY OF URINE CREATININE: CPT | Performed by: INTERNAL MEDICINE

## 2018-10-25 PROCEDURE — 82607 VITAMIN B-12: CPT | Performed by: INTERNAL MEDICINE

## 2018-10-26 LAB
CREAT 24H UR-MCNC: 57 MG/DL
MICROALBUMIN UR-MCNC: 1063.1 UG/ML
MICROALBUMIN/CREAT UR: 1865.1 MG/G CREAT (ref 0–30)

## 2018-10-31 ENCOUNTER — OFFICE VISIT (OUTPATIENT)
Dept: ENDOCRINOLOGY | Facility: CLINIC | Age: 58
End: 2018-10-31

## 2018-10-31 ENCOUNTER — TELEPHONE (OUTPATIENT)
Dept: ENDOCRINOLOGY | Facility: CLINIC | Age: 58
End: 2018-10-31

## 2018-10-31 VITALS
SYSTOLIC BLOOD PRESSURE: 132 MMHG | WEIGHT: 293 LBS | HEART RATE: 76 BPM | HEIGHT: 60 IN | OXYGEN SATURATION: 95 % | DIASTOLIC BLOOD PRESSURE: 76 MMHG | BODY MASS INDEX: 57.52 KG/M2

## 2018-10-31 DIAGNOSIS — E78.2 MIXED DIABETIC HYPERLIPIDEMIA ASSOCIATED WITH TYPE 2 DIABETES MELLITUS (HCC): ICD-10-CM

## 2018-10-31 DIAGNOSIS — E53.8 B12 DEFICIENCY: ICD-10-CM

## 2018-10-31 DIAGNOSIS — E03.9 ACQUIRED HYPOTHYROIDISM: ICD-10-CM

## 2018-10-31 DIAGNOSIS — E11.59 HYPERTENSION ASSOCIATED WITH DIABETES (HCC): ICD-10-CM

## 2018-10-31 DIAGNOSIS — M81.0 OSTEOPOROSIS, UNSPECIFIED OSTEOPOROSIS TYPE, UNSPECIFIED PATHOLOGICAL FRACTURE PRESENCE: ICD-10-CM

## 2018-10-31 DIAGNOSIS — N18.30 TYPE 2 DIABETES MELLITUS WITH STAGE 3 CHRONIC KIDNEY DISEASE, WITH LONG-TERM CURRENT USE OF INSULIN (HCC): Primary | ICD-10-CM

## 2018-10-31 DIAGNOSIS — E11.22 TYPE 2 DIABETES MELLITUS WITH STAGE 3 CHRONIC KIDNEY DISEASE, WITH LONG-TERM CURRENT USE OF INSULIN (HCC): Primary | ICD-10-CM

## 2018-10-31 DIAGNOSIS — Z79.4 TYPE 2 DIABETES MELLITUS WITH STAGE 3 CHRONIC KIDNEY DISEASE, WITH LONG-TERM CURRENT USE OF INSULIN (HCC): Primary | ICD-10-CM

## 2018-10-31 DIAGNOSIS — E11.69 MIXED DIABETIC HYPERLIPIDEMIA ASSOCIATED WITH TYPE 2 DIABETES MELLITUS (HCC): ICD-10-CM

## 2018-10-31 DIAGNOSIS — E55.9 VITAMIN D DEFICIENCY: ICD-10-CM

## 2018-10-31 DIAGNOSIS — I15.2 HYPERTENSION ASSOCIATED WITH DIABETES (HCC): ICD-10-CM

## 2018-10-31 PROCEDURE — 99214 OFFICE O/P EST MOD 30 MIN: CPT | Performed by: INTERNAL MEDICINE

## 2018-10-31 NOTE — PROGRESS NOTES
Maria C Shrestha is a 58 y.o. female who presents for  evaluation of   Chief Complaint   Patient presents with   • Diabetes     bs 127         Primary Care / Referring Provider  Ole Soliman MD    History of Present Illness  Duration/Timing:  Diabetes mellitus type 2  timing constant    quality controlled     severity high     Severity (Complications/Hospitalizations)  Secondary Microvascular Complications:  Diabetic Nephropathy, No Diabetic Neuropathy      Context  Diabetes Regimen:  Insulin, Compliant with regimen  Blood Glucose Readings  near goal   Diet  counts carbs, eating only 45 g cho per meal   Exercise:  Does not exercise    Associated Signs/Symptoms  Hyperglycemic Symptoms:  No polyuria, No polydipsia, No polyphagia, Weight loss  Hypoglycemic Episodes:  No documented hypoglycemia    Past Medical History:   Diagnosis Date   • Acquired hypothyroidism 2/6/2017   • Allergic    • Anemia    • Anxiety    • Arthritis    • Cellulitis    • Chronic kidney disease     3rd stage   • Depression    • Disease of thyroid gland    • Dyslipidemia    • Elevated cholesterol    • Essential hypertension    • Fatigue    • Gallbladder abscess    • Hearing loss    • Light headed    • Limited range of motion (ROM) of shoulder     right   • Obesity    • Palpitation    • Short of breath on exertion    • Sleep apnea    • Type 2 diabetes mellitus (CMS/HCC)    • Type II diabetes mellitus, uncontrolled (CMS/HCC)    • Wears glasses      Family History   Problem Relation Age of Onset   • Diabetes Other    • Cancer Mother    • Cancer Father    • Heart disease Father    • Diabetes Father    • Obesity Father    • Stroke Father    • Cancer Maternal Grandmother    • Uterine cancer Maternal Grandmother    • Diabetes Maternal Grandfather    • Cancer Paternal Grandmother    • Colon cancer Paternal Grandmother    • Diabetes Paternal Grandfather    • Heart disease Paternal Grandfather    • No Known Problems Sister    • No Known Problems Brother     • No Known Problems Daughter    • No Known Problems Son    • No Known Problems Maternal Aunt    • No Known Problems Paternal Aunt    • BRCA 1/2 Neg Hx    • Breast cancer Neg Hx    • Endometrial cancer Neg Hx    • Ovarian cancer Neg Hx      Social History   Substance Use Topics   • Smoking status: Never Smoker   • Smokeless tobacco: Never Used   • Alcohol use No         Current Outpatient Prescriptions:   •  allopurinol (ZYLOPRIM) 100 MG tablet, Take 100 mg by mouth 2 (Two) Times a Day., Disp: , Rfl:   •  amLODIPine (NORVASC) 5 MG tablet, , Disp: , Rfl:   •  aspirin 81 MG tablet, Take 81 mg by mouth Daily. Stop 7/22/2018, Disp: , Rfl:   •  busPIRone (BUSPAR) 10 MG tablet, buspirone 10 mg tablet  Take 2 tablets twice a day by oral route as needed for 90 days., Disp: , Rfl:   •  carvedilol (COREG) 3.125 MG tablet, Take 3.125 mg by mouth 2 (Two) Times a Day., Disp: , Rfl:   •  cetirizine (zyrTEC) 10 MG tablet, Take 10 mg by mouth As Needed., Disp: , Rfl:   •  Cholecalciferol (VITAMIN D3) 22421 units tablet, Vitamin D2 50,000 unit capsule  Take 1 capsule every week by oral route. Sunday, Disp: , Rfl:   •  cinacalcet (SENSIPAR) 30 MG tablet, Take 1 tablet by mouth Daily., Disp: 30 tablet, Rfl: 11  •  citalopram (CeleXA) 20 MG tablet, Take 20 mg by mouth Daily., Disp: , Rfl:   •  diazePAM (VALIUM) 2 MG tablet, Take 2 mg by mouth As Needed for Anxiety., Disp: , Rfl:   •  fluticasone (VERAMYST) 27.5 MCG/SPRAY nasal spray, 2 sprays into each nostril Daily., Disp: , Rfl:   •  furosemide (LASIX) 40 MG tablet, Take 1 tablet by mouth Daily., Disp: 90 tablet, Rfl: 3  •  Glucose Blood (BLOOD GLUCOSE TEST) strip, Use 4 x daily, use any brand covered by insurance or same brand as before, Disp: 360 each, Rfl: 3  •  HUMULIN R 500 UNIT/ML CONCENTRATED injection, USING A U100 SYRINGE DRAW UP TO THE DIRECTED UNIT ROXI AND INJECT FOUR TIMES A DAY (UP TO 30 UNIT ROXI FOUR TIMES A DAY) (Patient taking differently: USING A U100 SYRINGE DRAW  "UP TO THE DIRECTED UNIT ROXI AND INJECT FOUR TIMES A DAY (UP TO 30 UNIT ROXI FOUR TIMES A DAY)  6 units with meals), Disp: 100 mL, Rfl: 3  •  ibuprofen (ADVIL,MOTRIN) 800 MG tablet, Take 800 mg by mouth Every 8 (Eight) Hours As Needed for Mild Pain ., Disp: , Rfl:   •  insulin glargine (LANTUS) 100 UNIT/ML injection, 200 units daily (Patient taking differently: Inject 180 Units under the skin Every Night. 200 units daily), Disp: 18 each, Rfl: 3  •  Insulin Syringe 31G X 5/16\" 1 ML misc, 4 x daily, Disp: 120 each, Rfl: 11  •  lamoTRIgine (LaMICtal) 50 MG tablet dispersible disintegrating tablet, Take 50 mg by mouth Every Night., Disp: , Rfl:   •  Lancets (FREESTYLE) lancets, FOUR TIMES A DAY, Disp: 150 each, Rfl: 11  •  levothyroxine (SYNTHROID, LEVOTHROID) 50 MCG tablet, Take 1 tablet by mouth Daily., Disp: 90 tablet, Rfl: 1  •  lisinopril (PRINIVIL,ZESTRIL) 20 MG tablet, Take 20 mg by mouth 2 (Two) Times a Day., Disp: , Rfl:   •  Multiple Vitamins-Minerals (MULTIVITAMIN ADULT PO), Take 1 tablet by mouth Daily., Disp: , Rfl:   •  norethindrone (AYGESTIN) 5 MG tablet, Take 1 tablet by mouth Daily., Disp: 30 tablet, Rfl: 1  •  potassium gluconate 595 (99 K) MG tablet tablet, potassium gluconate 595 mg (99 mg) tablet  Take 2 tablets every day by oral route., Disp: , Rfl:   •  rosuvastatin (CRESTOR) 10 MG tablet, Take 10 mg by mouth Daily., Disp: , Rfl:   •  sodium bicarbonate 650 MG tablet, Take 650 mg by mouth 3 (Three) Times a Day., Disp: , Rfl:   •  traMADol (ULTRAM) 50 MG tablet, Take 1 tablet by mouth Every 6 (Six) Hours As Needed for Moderate Pain ., Disp: 8 tablet, Rfl: 0  •  TRULICITY 1.5 MG/0.5ML solution pen-injector, INJECT 1.5 MG UNDER THE SKIN EVERY 7 DAYS (Patient taking differently: INJECT 1.5 MG UNDER THE SKIN EVERY 7 DAYS Sunday), Disp: 6 mL, Rfl: 3    Review of Systems    Review of Systems   Constitutional: Positive for unexpected weight change. Negative for activity change, appetite change, chills, " "diaphoresis, fatigue and fever.   HENT: Negative for congestion, drooling, ear discharge, ear pain, facial swelling, mouth sores, nosebleeds, postnasal drip, sinus pressure, sneezing, sore throat, tinnitus, trouble swallowing and voice change.    Eyes: Negative.  Negative for photophobia, pain, discharge, redness and itching.   Respiratory: Negative.  Negative for apnea, cough, choking, chest tightness, shortness of breath, wheezing and stridor.    Cardiovascular: Negative.  Negative for chest pain, palpitations and leg swelling.   Gastrointestinal: Negative.  Negative for abdominal distention, abdominal pain, constipation, diarrhea, nausea and vomiting.   Endocrine: Positive for polydipsia, polyphagia and polyuria. Negative for cold intolerance and heat intolerance.   Genitourinary: Negative for difficulty urinating, dysuria, flank pain and frequency.   Musculoskeletal: Positive for arthralgias, back pain and myalgias. Negative for gait problem, joint swelling, neck pain and neck stiffness.   Skin: Negative for color change, pallor, rash and wound.   Allergic/Immunologic: Negative for environmental allergies, food allergies and immunocompromised state.   Neurological: Negative for dizziness, tremors, seizures, syncope, facial asymmetry, speech difficulty, weakness, light-headedness, numbness and headaches.   Hematological: Negative for adenopathy. Does not bruise/bleed easily.   Psychiatric/Behavioral: Negative for agitation, behavioral problems, confusion, decreased concentration, dysphoric mood, hallucinations, self-injury, sleep disturbance and suicidal ideas. The patient is not nervous/anxious and is not hyperactive.         Objective:     /76   Pulse 76   Ht 152.4 cm (60\")   Wt (!) 179 kg (394 lb 3.2 oz)   LMP  (LMP Unknown)   SpO2 95%   BMI 76.99 kg/m²     Physical Exam   Constitutional: She is oriented to person, place, and time. She appears well-developed.   HENT:   Head: Normocephalic.   Right " Ear: External ear normal.   Left Ear: External ear normal.   Nose: Nose normal.   Eyes: Conjunctivae and EOM are normal. No scleral icterus.   Neck: Normal range of motion. Neck supple. No tracheal deviation present. No thyromegaly present.   Cardiovascular: Normal rate, regular rhythm and intact distal pulses.  Exam reveals no gallop and no friction rub.    Murmur heard.  Systolic murmur, carotid bruit    Pulmonary/Chest: Effort normal and breath sounds normal. No stridor. No respiratory distress. She has no wheezes. She has no rales. She exhibits no tenderness.   Abdominal: Soft. Bowel sounds are normal. She exhibits no distension and no mass. There is no tenderness. There is no rebound and no guarding.   Musculoskeletal: Normal range of motion. She exhibits no tenderness or deformity.   Lymphadenopathy:     She has no cervical adenopathy.   Neurological: She is alert and oriented to person, place, and time. She displays normal reflexes. She exhibits normal muscle tone. Coordination normal.   Skin: No rash noted. No erythema. No pallor.   Psychiatric: She has a normal mood and affect. Her behavior is normal. Judgment and thought content normal.       Lab Review    Results for orders placed or performed in visit on 10/17/18   STAT Comprehensive Metabolic Panel   Result Value Ref Range    Glucose 224 (H) 70 - 100 mg/dL    BUN 38 (H) 5 - 21 mg/dL    Creatinine 2.04 (H) 0.50 - 1.40 mg/dL    Sodium 142 135 - 145 mmol/L    Potassium 4.2 3.5 - 5.3 mmol/L    Chloride 105 98 - 110 mmol/L    CO2 28.0 24.0 - 31.0 mmol/L    Calcium 8.9 8.4 - 10.4 mg/dL    Total Protein 7.1 6.3 - 8.7 g/dL    Albumin 3.80 3.50 - 5.00 g/dL    ALT (SGPT) <15 0 - 54 U/L    AST (SGOT) 25 7 - 45 U/L    Alkaline Phosphatase 97 24 - 120 U/L    Total Bilirubin 0.2 0.1 - 1.0 mg/dL    eGFR Non African Amer 25 (L) >60 mL/min/1.73    Globulin 3.3 gm/dL    A/G Ratio 1.2 1.1 - 2.5 g/dL    BUN/Creatinine Ratio 18.6 7.0 - 25.0    Anion Gap 9.0 4.0 - 13.0  mmol/L   CBC Auto Differential   Result Value Ref Range    WBC 10.53 4.80 - 10.80 10*3/mm3    RBC 3.42 (L) 4.20 - 5.40 10*6/mm3    Hemoglobin 10.2 (L) 12.0 - 16.0 g/dL    Hematocrit 31.6 (L) 37.0 - 47.0 %    MCV 92.4 82.0 - 98.0 fL    MCH 29.8 28.0 - 32.0 pg    MCHC 32.3 (L) 33.0 - 36.0 g/dL    RDW 17.3 (H) 12.0 - 15.0 %    RDW-SD 58.5 (H) 40.0 - 54.0 fl    MPV 10.4 6.0 - 12.0 fL    Platelets 230 130 - 400 10*3/mm3    Neutrophil % 72.8 39.0 - 78.0 %    Lymphocyte % 17.9 15.0 - 45.0 %    Monocyte % 5.5 4.0 - 12.0 %    Eosinophil % 2.7 0.0 - 4.0 %    Basophil % 0.4 0.0 - 2.0 %    Immature Grans % 0.7 0.0 - 5.0 %    Neutrophils, Absolute 7.68 1.87 - 8.40 10*3/mm3    Lymphocytes, Absolute 1.88 0.72 - 4.86 10*3/mm3    Monocytes, Absolute 0.58 0.19 - 1.30 10*3/mm3    Eosinophils, Absolute 0.28 0.00 - 0.70 10*3/mm3    Basophils, Absolute 0.04 0.00 - 0.20 10*3/mm3    Immature Grans, Absolute 0.07 (H) 0.00 - 0.03 10*3/mm3    nRBC 0.0 0.0 - 0.0 /100 WBC   Gold Top - SST   Result Value Ref Range    Extra Tube Hold for add-ons.    Gold Top - SST   Result Value Ref Range    Extra Tube Hold for add-ons.            Assessment/Plan       ICD-10-CM ICD-9-CM   1. Type 2 diabetes mellitus with stage 3 chronic kidney disease, with long-term current use of insulin (CMS/[facility]) E11.22 250.40    N18.3 585.3    Z79.4 V58.67   2. Mixed diabetic hyperlipidemia associated with type 2 diabetes mellitus (CMS/[facility]) E11.69 250.80    E78.2 272.2   3. Hypertension associated with diabetes (CMS/HCC) E11.59 250.80    I10 401.9   4. Acquired hypothyroidism E03.9 244.9   5. B12 deficiency E53.8 266.2   6. Vitamin D deficiency E55.9 268.9       Glycemic Management:   Lab Results   Component Value Date    HGBA1C 7.0 10/25/2018    HGBA1C 7.6 07/05/2018    HGBA1C 6.2 09/29/2017     Lab Results   Component Value Date    GLUCOSE 162 (H) 10/25/2018    BUN 33 (H) 10/25/2018    CREATININE 1.92 (H) 10/25/2018    EGFRIFNONA 27 (L) 10/25/2018    BCR 17.2 10/25/2018     CO2 24.0 10/25/2018    CALCIUM 8.7 10/25/2018    ALBUMIN 4.00 10/25/2018    AST 22 10/25/2018    ALT 23 10/25/2018     Lab Results   Component Value Date    WBC 11.00 (H) 10/25/2018    HGB 10.5 (L) 10/25/2018    HCT 32.4 (L) 10/25/2018    MCV 90.0 10/25/2018     10/25/2018     Lab Results   Component Value Date    CREATININE 1.92 (H) 10/25/2018    CREATININE 2.04 (H) 10/17/2018    CREATININE 2.01 (H) 09/19/2018    CREATININE 1.82 (H) 08/22/2018    CREATININE 2.42 (H) 07/24/2018         Trulicity 1.5 mg weekly       Humulin U 500     At this point 50 units for meals    In addition doing lantus 180 units    invokana stopped due to decline in renal function    Approve for  Insulin pump and or Continuous Glucose Sensor     #1  Patient has diabetes mellitus, insulin-dependent.    #2 She performs blood glucose testing 4 times daily and blood glucose log was brought to office with variability from .    #3  She is requiring  Basal insulin  and Prandial Insulin 3 times daily for a total of 4 injections per day.    #4 Patient tests blood sugars 4 times daily and makes frequent self-adjustments and patient is injecting insulin 4 times daily. She has been doing this for more than 90 days. She tests frequently due to hypoglycemia and hyperglycemia.     #5 I have personally seen patient within the past 6 months    #6 We plan on seeing her every 2-3 months for continuous adjustment of her diabetes regimen     #7 patient has hypoglycemia with episodes of unawareness.    #8 patient has day-to-day variation in her mealtime which confounds the degree of insulin dosing with multiple daily injections.    #9 patient has completed diabetes education program with us.    #10 she has demonstrated the ability to self monitor her glucose.        #11 Patient is motivated in improving  diabetes control       Lipid Management    Lab Results   Component Value Date    TRIG 362 (H) 10/25/2018    TRIG 419 (H) 07/05/2018    TRIG 261 (H)  09/29/2017     Lab Results   Component Value Date    HDL 37 (L) 10/25/2018    HDL 33 (L) 07/05/2018    HDL 35 (L) 09/29/2017     No components found for: LDLCALC  Lab Results   Component Value Date    LDL 64 10/25/2018    LDL  07/05/2018      Comment:      Unable to calculate    LDL 68 09/29/2017     Lab Results   Component Value Date    LDL 64 10/25/2018    LDL  07/05/2018      Comment:      Unable to calculate    LDL 68 09/29/2017             on Crestor 20 mg daily   Generic causing myalgias    Return to brand name       Blood Pressure Management:    Vitals:    10/31/18 1053   BP: 132/76   Pulse: 76   SpO2: 95%         Per nephrology       Microvascular Complication Monitoring:  No Microalbuminuria, No Diabetic Retinopathy, Date of last eye exam 07/18/2016, No Diabetic Neuropathy  on ACE i     ckd stage III-IV    followed by nephrology     --      Lab Results   Component Value Date    CREATININE 1.92 (H) 10/25/2018    BUN 33 (H) 10/25/2018     10/25/2018    K 4.0 10/25/2018     10/25/2018    CO2 24.0 10/25/2018         Lab Results   Component Value Date    CREATININE 1.92 (H) 10/25/2018    CREATININE 2.04 (H) 10/17/2018    CREATININE 2.01 (H) 09/19/2018         Immunizations:  Last pneumococcal immunization pneumovax before age 65     Flu Shot will have in Mid Nov 2016       Preventive Care:  No smoking      Weight Related:   Wt Readings from Last 3 Encounters:   10/31/18 (!) 179 kg (394 lb 3.2 oz)   10/17/18 (!) 181 kg (399 lb)   10/17/18 (!) 181 kg (399 lb 12.8 oz)     Body mass index is 76.99 kg/m².      prefers no bariatric surgery    Now on cpap       Bone Health    Lab Results   Component Value Date    CALCIUM 8.7 10/25/2018     For JARETH     Was having hypercalcemia with rocatrol 0.25 three times weekly and on calcium    Now on sensipar 30 mg daily but without calcium    Start calcium low dose 400 mg daily     Last DXA more than 2 years ago     Thyroid Health  Lab Results   Component Value Date     TSH 3.000 10/25/2018     On levothyroxine 50 ug daily    Other Diabetes Related Aspects       Lab Results   Component Value Date    [id number] 10/25/2018       Anemia , managed by nephrology   Deciding vs epo vs iron   But now   DUB, may need hysterectomy       Systolic Murmur, AS? Echo   Could be due to anemia     Echo and carotid US from 7-17 , normal echo and less than 50% blockage in carotids    I reviewed and summarized records from Ole Soliman MD from 2016 and I reviewed / ordered labs.     No orders of the defined types were placed in this encounter.        A copy of my note was sent to Ole Soliman MD    Please see my above opinion and suggestions.

## 2018-11-09 DIAGNOSIS — N18.30 ANEMIA IN STAGE 3 CHRONIC KIDNEY DISEASE (HCC): ICD-10-CM

## 2018-11-09 DIAGNOSIS — D63.1 ANEMIA IN STAGE 3 CHRONIC KIDNEY DISEASE (HCC): ICD-10-CM

## 2018-11-14 ENCOUNTER — INFUSION (OUTPATIENT)
Dept: ONCOLOGY | Facility: HOSPITAL | Age: 58
End: 2018-11-14
Attending: INTERNAL MEDICINE

## 2018-11-14 ENCOUNTER — LAB (OUTPATIENT)
Dept: LAB | Facility: HOSPITAL | Age: 58
End: 2018-11-14
Attending: INTERNAL MEDICINE

## 2018-11-14 ENCOUNTER — OFFICE VISIT (OUTPATIENT)
Dept: ONCOLOGY | Facility: CLINIC | Age: 58
End: 2018-11-14

## 2018-11-14 ENCOUNTER — HOSPITAL ENCOUNTER (OUTPATIENT)
Dept: BONE DENSITY | Facility: HOSPITAL | Age: 58
Discharge: HOME | End: 2018-11-14
Admitting: INTERNAL MEDICINE

## 2018-11-14 VITALS
HEIGHT: 65 IN | BODY MASS INDEX: 48.82 KG/M2 | TEMPERATURE: 97.4 F | OXYGEN SATURATION: 98 % | DIASTOLIC BLOOD PRESSURE: 61 MMHG | RESPIRATION RATE: 20 BRPM | SYSTOLIC BLOOD PRESSURE: 154 MMHG | HEART RATE: 76 BPM | WEIGHT: 293 LBS

## 2018-11-14 VITALS
HEIGHT: 60 IN | TEMPERATURE: 98 F | WEIGHT: 293 LBS | RESPIRATION RATE: 18 BRPM | HEART RATE: 72 BPM | BODY MASS INDEX: 57.52 KG/M2 | DIASTOLIC BLOOD PRESSURE: 76 MMHG | OXYGEN SATURATION: 96 % | SYSTOLIC BLOOD PRESSURE: 158 MMHG

## 2018-11-14 DIAGNOSIS — D63.1 ANEMIA IN STAGE 3 CHRONIC KIDNEY DISEASE (HCC): Primary | ICD-10-CM

## 2018-11-14 DIAGNOSIS — N18.30 TYPE 2 DIABETES MELLITUS WITH STAGE 3 CHRONIC KIDNEY DISEASE, WITH LONG-TERM CURRENT USE OF INSULIN (HCC): Primary | ICD-10-CM

## 2018-11-14 DIAGNOSIS — N18.30 ANEMIA IN STAGE 3 CHRONIC KIDNEY DISEASE (HCC): ICD-10-CM

## 2018-11-14 DIAGNOSIS — E11.22 TYPE 2 DIABETES MELLITUS WITH STAGE 3 CHRONIC KIDNEY DISEASE, WITH LONG-TERM CURRENT USE OF INSULIN (HCC): Primary | ICD-10-CM

## 2018-11-14 DIAGNOSIS — D63.1 ANEMIA IN STAGE 3 CHRONIC KIDNEY DISEASE (HCC): ICD-10-CM

## 2018-11-14 DIAGNOSIS — Z79.4 TYPE 2 DIABETES MELLITUS WITH STAGE 3 CHRONIC KIDNEY DISEASE, WITH LONG-TERM CURRENT USE OF INSULIN (HCC): Primary | ICD-10-CM

## 2018-11-14 DIAGNOSIS — N18.30 ANEMIA IN STAGE 3 CHRONIC KIDNEY DISEASE (HCC): Primary | ICD-10-CM

## 2018-11-14 LAB
ALBUMIN SERPL-MCNC: 3.9 G/DL (ref 3.5–5)
ALBUMIN/GLOB SERPL: 1.2 G/DL (ref 1.1–2.5)
ALP SERPL-CCNC: 82 U/L (ref 24–120)
ALT SERPL W P-5'-P-CCNC: 18 U/L (ref 0–54)
ANION GAP SERPL CALCULATED.3IONS-SCNC: 12 MMOL/L (ref 4–13)
AST SERPL-CCNC: 23 U/L (ref 7–45)
BASOPHILS # BLD AUTO: 0.03 10*3/MM3 (ref 0–0.2)
BASOPHILS NFR BLD AUTO: 0.3 % (ref 0–2)
BILIRUB SERPL-MCNC: 0.3 MG/DL (ref 0.1–1)
BUN BLD-MCNC: 35 MG/DL (ref 5–21)
BUN/CREAT SERPL: 17.9 (ref 7–25)
CALCIUM SPEC-SCNC: 8.5 MG/DL (ref 8.4–10.4)
CHLORIDE SERPL-SCNC: 103 MMOL/L (ref 98–110)
CO2 SERPL-SCNC: 27 MMOL/L (ref 24–31)
CREAT BLD-MCNC: 1.95 MG/DL (ref 0.5–1.4)
DEPRECATED RDW RBC AUTO: 60.9 FL (ref 40–54)
EOSINOPHIL # BLD AUTO: 0.19 10*3/MM3 (ref 0–0.7)
EOSINOPHIL NFR BLD AUTO: 2 % (ref 0–4)
ERYTHROCYTE [DISTWIDTH] IN BLOOD BY AUTOMATED COUNT: 17.7 % (ref 12–15)
GFR SERPL CREATININE-BSD FRML MDRD: 26 ML/MIN/1.73
GLOBULIN UR ELPH-MCNC: 3.3 GM/DL
GLUCOSE BLD-MCNC: 126 MG/DL (ref 70–100)
HCT VFR BLD AUTO: 31.3 % (ref 37–47)
HGB BLD-MCNC: 10 G/DL (ref 12–16)
HOLD SPECIMEN: NORMAL
HOLD SPECIMEN: NORMAL
IMM GRANULOCYTES # BLD: 0.04 10*3/MM3 (ref 0–0.03)
IMM GRANULOCYTES NFR BLD: 0.4 % (ref 0–5)
LYMPHOCYTES # BLD AUTO: 1.7 10*3/MM3 (ref 0.72–4.86)
LYMPHOCYTES NFR BLD AUTO: 17.7 % (ref 15–45)
MCH RBC QN AUTO: 29.6 PG (ref 28–32)
MCHC RBC AUTO-ENTMCNC: 31.9 G/DL (ref 33–36)
MCV RBC AUTO: 92.6 FL (ref 82–98)
MONOCYTES # BLD AUTO: 0.57 10*3/MM3 (ref 0.19–1.3)
MONOCYTES NFR BLD AUTO: 5.9 % (ref 4–12)
NEUTROPHILS # BLD AUTO: 7.08 10*3/MM3 (ref 1.87–8.4)
NEUTROPHILS NFR BLD AUTO: 73.7 % (ref 39–78)
NRBC BLD MANUAL-RTO: 0 /100 WBC (ref 0–0)
PLATELET # BLD AUTO: 218 10*3/MM3 (ref 130–400)
PMV BLD AUTO: 10.2 FL (ref 6–12)
POTASSIUM BLD-SCNC: 4.4 MMOL/L (ref 3.5–5.3)
PROT SERPL-MCNC: 7.2 G/DL (ref 6.3–8.7)
RBC # BLD AUTO: 3.38 10*6/MM3 (ref 4.2–5.4)
SODIUM BLD-SCNC: 142 MMOL/L (ref 135–145)
WBC NRBC COR # BLD: 9.61 10*3/MM3 (ref 4.8–10.8)

## 2018-11-14 PROCEDURE — 63510000001 EPOETIN ALFA PER 1000 UNITS: Performed by: INTERNAL MEDICINE

## 2018-11-14 PROCEDURE — 99214 OFFICE O/P EST MOD 30 MIN: CPT | Performed by: INTERNAL MEDICINE

## 2018-11-14 PROCEDURE — 96372 THER/PROPH/DIAG INJ SC/IM: CPT

## 2018-11-14 PROCEDURE — 80053 COMPREHEN METABOLIC PANEL: CPT

## 2018-11-14 PROCEDURE — 77080 DXA BONE DENSITY AXIAL: CPT

## 2018-11-14 PROCEDURE — 36415 COLL VENOUS BLD VENIPUNCTURE: CPT

## 2018-11-14 PROCEDURE — 85025 COMPLETE CBC W/AUTO DIFF WBC: CPT

## 2018-11-14 RX ADMIN — ERYTHROPOIETIN 40000 UNITS: 40000 INJECTION, SOLUTION INTRAVENOUS; SUBCUTANEOUS at 14:11

## 2018-11-14 NOTE — PROGRESS NOTES
[facility]  HEMATOLOGY & ONCOLOGY        Subjective     VISIT DIAGNOSIS:   Encounter Diagnoses   Name Primary?   • Anemia in stage 3 chronic kidney disease (CMS/HCC)    • Type 2 diabetes mellitus with stage 3 chronic kidney disease, with long-term current use of insulin (CMS/HCC) Yes       REASON FOR VISIT:     Chief Complaint   Patient presents with   • Chronic Kidney Disease     She is here for f/u visit today x 1 month, review lab work and possible Procrit Injection        HEMATOLOGY / ONCOLOGY HISTORY:Ms. Shrestha is a 55 year old female with past medical history of hypertension, chronic kidney disease, diabetes insulindependent  type 2   No history exists.     [No treatment plan]  Cancer Staging Information:  No matching staging information was found for the patient.      INTERVAL HISTORY  Patient ID: Maria C Shrestha is a 58 y.o. year old female   Anemia secondary to chronic kidney disease, Stage III. Patient has not required VALERIE therapy this far.  2. Iron deficiency in the setting of chronic kidney disease, has benefitted from Feraheme. Had Feraheme in January, hemoglobin has  now near normalized at 10.7        Review of Systems   Constitutional: Negative.    HENT: Negative.    Eyes: Negative.    Respiratory: Negative.    Cardiovascular: Negative.    Gastrointestinal: Negative.    Endocrine: Negative.    Genitourinary: Negative.    Musculoskeletal: Negative.    Skin: Negative.    Allergic/Immunologic: Negative.    Neurological: Negative.    Hematological: Negative.    Psychiatric/Behavioral: Negative.             Medications:    Current Outpatient Medications   Medication Sig Dispense Refill   • allopurinol (ZYLOPRIM) 100 MG tablet Take 100 mg by mouth 2 (Two) Times a Day.     • amLODIPine (NORVASC) 5 MG tablet      • aspirin 81 MG tablet Take 81 mg by mouth Daily. Stop 7/22/2018     • busPIRone (BUSPAR) 10 MG tablet buspirone 10 mg tablet   Take 2 tablets twice a day by oral route as needed for  "90 days.     • carvedilol (COREG) 3.125 MG tablet Take 3.125 mg by mouth 2 (Two) Times a Day.     • cetirizine (zyrTEC) 10 MG tablet Take 10 mg by mouth As Needed.     • Cholecalciferol (VITAMIN D3) 58799 units tablet Vitamin D2 50,000 unit capsule   Take 1 capsule every week by oral route. Sunday     • cinacalcet (SENSIPAR) 30 MG tablet Take 1 tablet by mouth Daily. 30 tablet 11   • citalopram (CeleXA) 20 MG tablet Take 20 mg by mouth Daily.     • diazePAM (VALIUM) 2 MG tablet Take 2 mg by mouth As Needed for Anxiety.     • fluticasone (VERAMYST) 27.5 MCG/SPRAY nasal spray 2 sprays into each nostril Daily.     • furosemide (LASIX) 40 MG tablet Take 1 tablet by mouth Daily. 90 tablet 3   • Glucose Blood (BLOOD GLUCOSE TEST) strip Use 4 x daily, use any brand covered by insurance or same brand as before 360 each 3   • HUMULIN R 500 UNIT/ML CONCENTRATED injection USING A U100 SYRINGE DRAW UP TO THE DIRECTED UNIT ROXI AND INJECT FOUR TIMES A DAY (UP TO 30 UNIT ROXI FOUR TIMES A DAY) (Patient taking differently: USING A U100 SYRINGE DRAW UP TO THE DIRECTED UNIT ROXI AND INJECT FOUR TIMES A DAY (UP TO 30 UNIT ROXI FOUR TIMES A DAY)  6 units with meals) 100 mL 3   • ibuprofen (ADVIL,MOTRIN) 800 MG tablet Take 800 mg by mouth Every 8 (Eight) Hours As Needed for Mild Pain .     • insulin glargine (LANTUS) 100 UNIT/ML injection 200 units daily (Patient taking differently: Inject 180 Units under the skin Every Night. 200 units daily) 18 each 3   • Insulin Syringe 31G X 5/16\" 1 ML misc 4 x daily 120 each 11   • lamoTRIgine (LaMICtal) 50 MG tablet dispersible disintegrating tablet Take 50 mg by mouth Every Night.     • Lancets (FREESTYLE) lancets FOUR TIMES A  each 11   • levothyroxine (SYNTHROID, LEVOTHROID) 50 MCG tablet Take 1 tablet by mouth Daily. 90 tablet 1   • lisinopril (PRINIVIL,ZESTRIL) 20 MG tablet Take 20 mg by mouth 2 (Two) Times a Day.     • Multiple Vitamins-Minerals (MULTIVITAMIN ADULT PO) Take 1 tablet " by mouth Daily.     • potassium gluconate 595 (99 K) MG tablet tablet potassium gluconate 595 mg (99 mg) tablet   Take 2 tablets every day by oral route.     • rosuvastatin (CRESTOR) 10 MG tablet Take 10 mg by mouth Daily.     • sodium bicarbonate 650 MG tablet Take 650 mg by mouth 3 (Three) Times a Day.     • traMADol (ULTRAM) 50 MG tablet Take 1 tablet by mouth Every 6 (Six) Hours As Needed for Moderate Pain . 8 tablet 0   • TRULICITY 1.5 MG/0.5ML solution pen-injector INJECT 1.5 MG UNDER THE SKIN EVERY 7 DAYS (Patient taking differently: INJECT 1.5 MG UNDER THE SKIN EVERY 7 DAYS Sunday) 6 mL 3   • norethindrone (AYGESTIN) 5 MG tablet Take 1 tablet by mouth Daily. 30 tablet 1     No current facility-administered medications for this visit.        ALLERGIES:    Allergies   Allergen Reactions   • Codeine Nausea Only       Objective      @VITALS    Current Status 11/14/2018   ECOG score 3       General Appearance: Patient is awake, alert, oriented and in no acute distress. Patient is welldeveloped, wellnourished, and appears stated age.  HEENT: Normocephalic. Sclerae clear, conjunctiva pink, extraocular movements intact, pupils, round, reactive to light and  accommodation. Mouth and throat are clear with moist oral mucosa.  NECK: Supple, no jugular venous distention, thyroid not enlarged.  LYMPH: No cervical, supraclavicular, axillary, or inguinal lymphadenopathy.  CHEST: Equal bilateral expansion, AP  diameter normal, resonant percussion note  LUNGS: Good air movement, no rales, rhonchi, rubs or wheezes with auscultation  CARDIO: Regular sinus rhythm, no murmurs, gallops or rubs.  ABDOMEN: Nondistended, soft, No tenderness, no guarding, no rebound, No hepatosplenomegaly. No abdominal masses. Bowel sounds positive. No hernia  GENITALIA: Not examined.  BREASTS: Not examined.  MUSKEL: No joint swelling, decreased motion, or inflammation  EXTREMS: No edema, clubbing, cyanosis, No varicose veins.  NEURO: Grossly  nonfocal, Gait is coordinated and smooth, Cognition is preserved.  SKIN: No rashes, no ecchymoses, no petechia.  PSYCH: Oriented to time, place and person. Memory is preserved. Mood and affect appear normal      RECENT LABS:  Lab on 11/14/2018   Component Date Value Ref Range Status   • WBC 11/14/2018 9.61  4.80 - 10.80 10*3/mm3 Final   • RBC 11/14/2018 3.38* 4.20 - 5.40 10*6/mm3 Final   • Hemoglobin 11/14/2018 10.0* 12.0 - 16.0 g/dL Final   • Hematocrit 11/14/2018 31.3* 37.0 - 47.0 % Final   • MCV 11/14/2018 92.6  82.0 - 98.0 fL Final   • MCH 11/14/2018 29.6  28.0 - 32.0 pg Final   • MCHC 11/14/2018 31.9* 33.0 - 36.0 g/dL Final   • RDW 11/14/2018 17.7* 12.0 - 15.0 % Final   • RDW-SD 11/14/2018 60.9* 40.0 - 54.0 fl Final   • MPV 11/14/2018 10.2  6.0 - 12.0 fL Final   • Platelets 11/14/2018 218  130 - 400 10*3/mm3 Final   • Neutrophil % 11/14/2018 73.7  39.0 - 78.0 % Final   • Lymphocyte % 11/14/2018 17.7  15.0 - 45.0 % Final   • Monocyte % 11/14/2018 5.9  4.0 - 12.0 % Final   • Eosinophil % 11/14/2018 2.0  0.0 - 4.0 % Final   • Basophil % 11/14/2018 0.3  0.0 - 2.0 % Final   • Immature Grans % 11/14/2018 0.4  0.0 - 5.0 % Final   • Neutrophils, Absolute 11/14/2018 7.08  1.87 - 8.40 10*3/mm3 Final   • Lymphocytes, Absolute 11/14/2018 1.70  0.72 - 4.86 10*3/mm3 Final   • Monocytes, Absolute 11/14/2018 0.57  0.19 - 1.30 10*3/mm3 Final   • Eosinophils, Absolute 11/14/2018 0.19  0.00 - 0.70 10*3/mm3 Final   • Basophils, Absolute 11/14/2018 0.03  0.00 - 0.20 10*3/mm3 Final   • Immature Grans, Absolute 11/14/2018 0.04* 0.00 - 0.03 10*3/mm3 Final   • nRBC 11/14/2018 0.0  0.0 - 0.0 /100 WBC Final       RADIOLOGY:  Dexa Bone Density Axial    Result Date: 11/14/2018  Narrative: EXAMINATION: DEXA BONE DENSITY AXIAL- 11/14/2018 11:13 AM CST  HISTORY: 58-year-old female being screened for osteoporosis.  Bone densitometry has been performed using a Hologic Discovery C Model bone densitometer. The routine evaluation includes the  lumbar spine and left hip.  The calculated bone density of the femoral neck is 0.643 g/cm2 which corresponds to a T-score of  -1.9 and a Z-score of -0.6.  The T-score corresponds to the number of standard deviations above or below the standard of a 30 year old patient.  The Z-score refers to the number of standard deviations above or below the mean for a person of the same age as this patient.  Evaluation of the lumbar spine demonstrates an average bone mineral density measurement of 1.093 g/cm2 which corresponds to a T-score of 0.4 and a Z-score of 1.7  Comparison: None      Impression: Impression:  1. Osteopenia of the left femoral neck. Low bone mass. The bone density is between 1.0 and 2.5 standard deviations below the mean for a young adult woman. There is an increased risk of fracture in these patients. This report was finalized on 11/14/2018 11:14 by Dr. Zuhair Walters MD.           Assessment/Plan        Anemia secondary to chronic kidney disease, Stage III. Patient has not required VALERIE therapy this far.  2. Iron deficiency in the setting of chronic kidney disease, has benefitted from Feraheme. Had Feraheme in January, hemoglobin has  now near normalized at 10.3.  Plan continue watchful waiting return to clinic in 3 months time and check another CBC and iron studies.  If the iron is still low I may give additional intravenous iron.  Her CMS guidelines I cannot give her Procrit unless hemoglobin is less than 10.    Clinically patient asymptomatic.  I will redraw her iron studies today.  Hemoglobin is a 10.  Return to clinic in 3 months time.            Marco A Melo MD    11/14/2018    1:33 PM             [facility]  HEMATOLOGY & ONCOLOGY        Subjective     VISIT DIAGNOSIS:   Encounter Diagnoses   Name Primary?   • Anemia in stage 3 chronic kidney disease (CMS/HCC)    • Type 2 diabetes mellitus with stage 3 chronic kidney disease, with long-term current use of insulin (CMS/HCC) Yes        REASON FOR VISIT:     Chief Complaint   Patient presents with   • Chronic Kidney Disease     She is here for f/u visit today x 1 month, review lab work and possible Procrit Injection        HEMATOLOGY / ONCOLOGY HISTORY:Ms. Shrestha is a 55 year old female with past medical history of hypertension, chronic kidney disease, diabetes insulindependent  type 2   No history exists.     [No treatment plan]  Cancer Staging Information:  No matching staging information was found for the patient.      INTERVAL HISTORY  Patient ID: Maria C Shrestha is a 58 y.o. year old female   Anemia secondary to chronic kidney disease, Stage III. Patient has not required VALERIE therapy this far.  2. Iron deficiency in the setting of chronic kidney disease, has benefitted from Feraheme. Had Feraheme in January, hemoglobin has  now near normalized at 10.7        Review of Systems   Constitutional: Negative.    HENT: Negative.    Eyes: Negative.    Respiratory: Negative.    Cardiovascular: Negative.    Gastrointestinal: Negative.    Endocrine: Negative.    Genitourinary: Negative.    Musculoskeletal: Negative.    Skin: Negative.    Allergic/Immunologic: Negative.    Neurological: Negative.    Hematological: Negative.    Psychiatric/Behavioral: Negative.             Medications:    Current Outpatient Medications   Medication Sig Dispense Refill   • allopurinol (ZYLOPRIM) 100 MG tablet Take 100 mg by mouth 2 (Two) Times a Day.     • amLODIPine (NORVASC) 5 MG tablet      • aspirin 81 MG tablet Take 81 mg by mouth Daily. Stop 7/22/2018     • busPIRone (BUSPAR) 10 MG tablet buspirone 10 mg tablet   Take 2 tablets twice a day by oral route as needed for 90 days.     • carvedilol (COREG) 3.125 MG tablet Take 3.125 mg by mouth 2 (Two) Times a Day.     • cetirizine (zyrTEC) 10 MG tablet Take 10 mg by mouth As Needed.     • Cholecalciferol (VITAMIN D3) 49566 units tablet Vitamin D2 50,000 unit capsule   Take 1 capsule every week by oral route. Sunday     •  "cinacalcet (SENSIPAR) 30 MG tablet Take 1 tablet by mouth Daily. 30 tablet 11   • citalopram (CeleXA) 20 MG tablet Take 20 mg by mouth Daily.     • diazePAM (VALIUM) 2 MG tablet Take 2 mg by mouth As Needed for Anxiety.     • fluticasone (VERAMYST) 27.5 MCG/SPRAY nasal spray 2 sprays into each nostril Daily.     • furosemide (LASIX) 40 MG tablet Take 1 tablet by mouth Daily. 90 tablet 3   • Glucose Blood (BLOOD GLUCOSE TEST) strip Use 4 x daily, use any brand covered by insurance or same brand as before 360 each 3   • HUMULIN R 500 UNIT/ML CONCENTRATED injection USING A U100 SYRINGE DRAW UP TO THE DIRECTED UNIT ORXI AND INJECT FOUR TIMES A DAY (UP TO 30 UNIT ORXI FOUR TIMES A DAY) (Patient taking differently: USING A U100 SYRINGE DRAW UP TO THE DIRECTED UNIT ROXI AND INJECT FOUR TIMES A DAY (UP TO 30 UNIT ROXI FOUR TIMES A DAY)  6 units with meals) 100 mL 3   • ibuprofen (ADVIL,MOTRIN) 800 MG tablet Take 800 mg by mouth Every 8 (Eight) Hours As Needed for Mild Pain .     • insulin glargine (LANTUS) 100 UNIT/ML injection 200 units daily (Patient taking differently: Inject 180 Units under the skin Every Night. 200 units daily) 18 each 3   • Insulin Syringe 31G X 5/16\" 1 ML misc 4 x daily 120 each 11   • lamoTRIgine (LaMICtal) 50 MG tablet dispersible disintegrating tablet Take 50 mg by mouth Every Night.     • Lancets (FREESTYLE) lancets FOUR TIMES A  each 11   • levothyroxine (SYNTHROID, LEVOTHROID) 50 MCG tablet Take 1 tablet by mouth Daily. 90 tablet 1   • lisinopril (PRINIVIL,ZESTRIL) 20 MG tablet Take 20 mg by mouth 2 (Two) Times a Day.     • Multiple Vitamins-Minerals (MULTIVITAMIN ADULT PO) Take 1 tablet by mouth Daily.     • potassium gluconate 595 (99 K) MG tablet tablet potassium gluconate 595 mg (99 mg) tablet   Take 2 tablets every day by oral route.     • rosuvastatin (CRESTOR) 10 MG tablet Take 10 mg by mouth Daily.     • sodium bicarbonate 650 MG tablet Take 650 mg by mouth 3 (Three) Times a Day. "     • traMADol (ULTRAM) 50 MG tablet Take 1 tablet by mouth Every 6 (Six) Hours As Needed for Moderate Pain . 8 tablet 0   • TRULICITY 1.5 MG/0.5ML solution pen-injector INJECT 1.5 MG UNDER THE SKIN EVERY 7 DAYS (Patient taking differently: INJECT 1.5 MG UNDER THE SKIN EVERY 7 DAYS Sunday) 6 mL 3   • norethindrone (AYGESTIN) 5 MG tablet Take 1 tablet by mouth Daily. 30 tablet 1     No current facility-administered medications for this visit.        ALLERGIES:    Allergies   Allergen Reactions   • Codeine Nausea Only       Objective      @VITALS    Current Status 11/14/2018   ECOG score 3       General Appearance: Patient is awake, alert, oriented and in no acute distress. Patient is welldeveloped, wellnourished, and appears stated age.  HEENT: Normocephalic. Sclerae clear, conjunctiva pink, extraocular movements intact, pupils, round, reactive to light and  accommodation. Mouth and throat are clear with moist oral mucosa.  NECK: Supple, no jugular venous distention, thyroid not enlarged.  LYMPH: No cervical, supraclavicular, axillary, or inguinal lymphadenopathy.  CHEST: Equal bilateral expansion, AP  diameter normal, resonant percussion note  LUNGS: Good air movement, no rales, rhonchi, rubs or wheezes with auscultation  CARDIO: Regular sinus rhythm, no murmurs, gallops or rubs.  ABDOMEN: Nondistended, soft, No tenderness, no guarding, no rebound, No hepatosplenomegaly. No abdominal masses. Bowel sounds positive. No hernia  GENITALIA: Not examined.  BREASTS: Not examined.  MUSKEL: No joint swelling, decreased motion, or inflammation  EXTREMS: No edema, clubbing, cyanosis, No varicose veins.  NEURO: Grossly nonfocal, Gait is coordinated and smooth, Cognition is preserved.  SKIN: No rashes, no ecchymoses, no petechia.  PSYCH: Oriented to time, place and person. Memory is preserved. Mood and affect appear normal      RECENT LABS:  Lab on 11/14/2018   Component Date Value Ref Range Status   • WBC 11/14/2018 9.61  4.80  - 10.80 10*3/mm3 Final   • RBC 11/14/2018 3.38* 4.20 - 5.40 10*6/mm3 Final   • Hemoglobin 11/14/2018 10.0* 12.0 - 16.0 g/dL Final   • Hematocrit 11/14/2018 31.3* 37.0 - 47.0 % Final   • MCV 11/14/2018 92.6  82.0 - 98.0 fL Final   • MCH 11/14/2018 29.6  28.0 - 32.0 pg Final   • MCHC 11/14/2018 31.9* 33.0 - 36.0 g/dL Final   • RDW 11/14/2018 17.7* 12.0 - 15.0 % Final   • RDW-SD 11/14/2018 60.9* 40.0 - 54.0 fl Final   • MPV 11/14/2018 10.2  6.0 - 12.0 fL Final   • Platelets 11/14/2018 218  130 - 400 10*3/mm3 Final   • Neutrophil % 11/14/2018 73.7  39.0 - 78.0 % Final   • Lymphocyte % 11/14/2018 17.7  15.0 - 45.0 % Final   • Monocyte % 11/14/2018 5.9  4.0 - 12.0 % Final   • Eosinophil % 11/14/2018 2.0  0.0 - 4.0 % Final   • Basophil % 11/14/2018 0.3  0.0 - 2.0 % Final   • Immature Grans % 11/14/2018 0.4  0.0 - 5.0 % Final   • Neutrophils, Absolute 11/14/2018 7.08  1.87 - 8.40 10*3/mm3 Final   • Lymphocytes, Absolute 11/14/2018 1.70  0.72 - 4.86 10*3/mm3 Final   • Monocytes, Absolute 11/14/2018 0.57  0.19 - 1.30 10*3/mm3 Final   • Eosinophils, Absolute 11/14/2018 0.19  0.00 - 0.70 10*3/mm3 Final   • Basophils, Absolute 11/14/2018 0.03  0.00 - 0.20 10*3/mm3 Final   • Immature Grans, Absolute 11/14/2018 0.04* 0.00 - 0.03 10*3/mm3 Final   • nRBC 11/14/2018 0.0  0.0 - 0.0 /100 WBC Final       RADIOLOGY:  Dexa Bone Density Axial    Result Date: 11/14/2018  Narrative: EXAMINATION: DEXA BONE DENSITY AXIAL- 11/14/2018 11:13 AM CST  HISTORY: 58-year-old female being screened for osteoporosis.  Bone densitometry has been performed using a Holo[employer] Dream Heat Discovery C Model bone densitometer. The routine evaluation includes the lumbar spine and left hip.  The calculated bone density of the femoral neck is 0.643 g/cm2 which corresponds to a T-score of  -1.9 and a Z-score of -0.6.  The T-score corresponds to the number of standard deviations above or below the standard of a 30 year old patient.  The Z-score refers to the number of standard  deviations above or below the mean for a person of the same age as this patient.  Evaluation of the lumbar spine demonstrates an average bone mineral density measurement of 1.093 g/cm2 which corresponds to a T-score of 0.4 and a Z-score of 1.7  Comparison: None      Impression: Impression:  1. Osteopenia of the left femoral neck. Low bone mass. The bone density is between 1.0 and 2.5 standard deviations below the mean for a young adult woman. There is an increased risk of fracture in these patients. This report was finalized on 11/14/2018 11:14 by Dr. Zuhair Walters MD.           Assessment/Plan        Anemia secondary to chronic kidney disease, Stage III. Patient has not required VALERIE therapy this far.  2. Iron deficiency in the setting of chronic kidney disease, has benefitted from Feraheme. Had Feraheme in January, hemoglobin has  now near normalized at 10.2.  Plan continue watchful waiting return to clinic in 3 months time and check another CBC and iron studies.  If the iron is still low I may give additional intravenous iron.  .    Clinically patient asymptomatic.  I will redraw her iron studies today.  Hemoglobin is a 10.3gm Today. Per CMS guidelines Rx Procrit 40,000 u s/q every month.       Marco A Melo MD    11/14/2018    1:33 PM             [facility]  HEMATOLOGY & ONCOLOGY        Subjective     VISIT DIAGNOSIS:   Encounter Diagnoses   Name Primary?   • Anemia in stage 3 chronic kidney disease (CMS/HCC)    • Type 2 diabetes mellitus with stage 3 chronic kidney disease, with long-term current use of insulin (CMS/HCC) Yes       REASON FOR VISIT:     Chief Complaint   Patient presents with   • Chronic Kidney Disease     She is here for f/u visit today x 1 month, review lab work and possible Procrit Injection        HEMATOLOGY / ONCOLOGY HISTORY:Ms. Shrestha is a 55 year old female with past medical history of hypertension, chronic kidney disease, diabetes insulindependent  type 2   No history  exists.     [No treatment plan]  Cancer Staging Information:  No matching staging information was found for the patient.      INTERVAL HISTORY  Patient ID: Maria C Shrestha is a 58 y.o. year old female   Anemia secondary to chronic kidney disease, Stage III. Patient has not required VALERIE therapy this far.  2. Iron deficiency in the setting of chronic kidney disease, has benefitted from Feraheme. Had Feraheme in January, hemoglobin has  now near normalized at 10.7        Review of Systems   Constitutional: Negative.    HENT: Negative.    Eyes: Negative.    Respiratory: Negative.    Cardiovascular: Negative.    Gastrointestinal: Negative.    Endocrine: Negative.    Genitourinary: Negative.    Musculoskeletal: Negative.    Skin: Negative.    Allergic/Immunologic: Negative.    Neurological: Negative.    Hematological: Negative.    Psychiatric/Behavioral: Negative.             Medications:    Current Outpatient Medications   Medication Sig Dispense Refill   • allopurinol (ZYLOPRIM) 100 MG tablet Take 100 mg by mouth 2 (Two) Times a Day.     • amLODIPine (NORVASC) 5 MG tablet      • aspirin 81 MG tablet Take 81 mg by mouth Daily. Stop 7/22/2018     • busPIRone (BUSPAR) 10 MG tablet buspirone 10 mg tablet   Take 2 tablets twice a day by oral route as needed for 90 days.     • carvedilol (COREG) 3.125 MG tablet Take 3.125 mg by mouth 2 (Two) Times a Day.     • cetirizine (zyrTEC) 10 MG tablet Take 10 mg by mouth As Needed.     • Cholecalciferol (VITAMIN D3) 20276 units tablet Vitamin D2 50,000 unit capsule   Take 1 capsule every week by oral route. Sunday     • cinacalcet (SENSIPAR) 30 MG tablet Take 1 tablet by mouth Daily. 30 tablet 11   • citalopram (CeleXA) 20 MG tablet Take 20 mg by mouth Daily.     • diazePAM (VALIUM) 2 MG tablet Take 2 mg by mouth As Needed for Anxiety.     • fluticasone (VERAMYST) 27.5 MCG/SPRAY nasal spray 2 sprays into each nostril Daily.     • furosemide (LASIX) 40 MG tablet Take 1 tablet by mouth  "Daily. 90 tablet 3   • Glucose Blood (BLOOD GLUCOSE TEST) strip Use 4 x daily, use any brand covered by insurance or same brand as before 360 each 3   • HUMULIN R 500 UNIT/ML CONCENTRATED injection USING A U100 SYRINGE DRAW UP TO THE DIRECTED UNIT ROXI AND INJECT FOUR TIMES A DAY (UP TO 30 UNIT ROXI FOUR TIMES A DAY) (Patient taking differently: USING A U100 SYRINGE DRAW UP TO THE DIRECTED UNIT ROXI AND INJECT FOUR TIMES A DAY (UP TO 30 UNIT ROXI FOUR TIMES A DAY)  6 units with meals) 100 mL 3   • ibuprofen (ADVIL,MOTRIN) 800 MG tablet Take 800 mg by mouth Every 8 (Eight) Hours As Needed for Mild Pain .     • insulin glargine (LANTUS) 100 UNIT/ML injection 200 units daily (Patient taking differently: Inject 180 Units under the skin Every Night. 200 units daily) 18 each 3   • Insulin Syringe 31G X 5/16\" 1 ML misc 4 x daily 120 each 11   • lamoTRIgine (LaMICtal) 50 MG tablet dispersible disintegrating tablet Take 50 mg by mouth Every Night.     • Lancets (FREESTYLE) lancets FOUR TIMES A  each 11   • levothyroxine (SYNTHROID, LEVOTHROID) 50 MCG tablet Take 1 tablet by mouth Daily. 90 tablet 1   • lisinopril (PRINIVIL,ZESTRIL) 20 MG tablet Take 20 mg by mouth 2 (Two) Times a Day.     • Multiple Vitamins-Minerals (MULTIVITAMIN ADULT PO) Take 1 tablet by mouth Daily.     • potassium gluconate 595 (99 K) MG tablet tablet potassium gluconate 595 mg (99 mg) tablet   Take 2 tablets every day by oral route.     • rosuvastatin (CRESTOR) 10 MG tablet Take 10 mg by mouth Daily.     • sodium bicarbonate 650 MG tablet Take 650 mg by mouth 3 (Three) Times a Day.     • traMADol (ULTRAM) 50 MG tablet Take 1 tablet by mouth Every 6 (Six) Hours As Needed for Moderate Pain . 8 tablet 0   • TRULICITY 1.5 MG/0.5ML solution pen-injector INJECT 1.5 MG UNDER THE SKIN EVERY 7 DAYS (Patient taking differently: INJECT 1.5 MG UNDER THE SKIN EVERY 7 DAYS Sunday) 6 mL 3   • norethindrone (AYGESTIN) 5 MG tablet Take 1 tablet by mouth Daily. " 30 tablet 1     No current facility-administered medications for this visit.        ALLERGIES:    Allergies   Allergen Reactions   • Codeine Nausea Only       Objective      @VITALS    Current Status 11/14/2018   ECOG score 3       General Appearance: Patient is awake, alert, oriented and in no acute distress. Patient is welldeveloped, wellnourished, and appears stated age.  HEENT: Normocephalic. Sclerae clear, conjunctiva pink, extraocular movements intact, pupils, round, reactive to light and  accommodation. Mouth and throat are clear with moist oral mucosa.  NECK: Supple, no jugular venous distention, thyroid not enlarged.  LYMPH: No cervical, supraclavicular, axillary, or inguinal lymphadenopathy.  CHEST: Equal bilateral expansion, AP  diameter normal, resonant percussion note  LUNGS: Good air movement, no rales, rhonchi, rubs or wheezes with auscultation  CARDIO: Regular sinus rhythm, no murmurs, gallops or rubs.  ABDOMEN: Nondistended, soft, No tenderness, no guarding, no rebound, No hepatosplenomegaly. No abdominal masses. Bowel sounds positive. No hernia  GENITALIA: Not examined.  BREASTS: Not examined.  MUSKEL: No joint swelling, decreased motion, or inflammation  EXTREMS: No edema, clubbing, cyanosis, No varicose veins.  NEURO: Grossly nonfocal, Gait is coordinated and smooth, Cognition is preserved.  SKIN: No rashes, no ecchymoses, no petechia.  PSYCH: Oriented to time, place and person. Memory is preserved. Mood and affect appear normal      RECENT LABS:  Lab on 11/14/2018   Component Date Value Ref Range Status   • WBC 11/14/2018 9.61  4.80 - 10.80 10*3/mm3 Final   • RBC 11/14/2018 3.38* 4.20 - 5.40 10*6/mm3 Final   • Hemoglobin 11/14/2018 10.0* 12.0 - 16.0 g/dL Final   • Hematocrit 11/14/2018 31.3* 37.0 - 47.0 % Final   • MCV 11/14/2018 92.6  82.0 - 98.0 fL Final   • MCH 11/14/2018 29.6  28.0 - 32.0 pg Final   • MCHC 11/14/2018 31.9* 33.0 - 36.0 g/dL Final   • RDW 11/14/2018 17.7* 12.0 - 15.0 % Final    • RDW-SD 11/14/2018 60.9* 40.0 - 54.0 fl Final   • MPV 11/14/2018 10.2  6.0 - 12.0 fL Final   • Platelets 11/14/2018 218  130 - 400 10*3/mm3 Final   • Neutrophil % 11/14/2018 73.7  39.0 - 78.0 % Final   • Lymphocyte % 11/14/2018 17.7  15.0 - 45.0 % Final   • Monocyte % 11/14/2018 5.9  4.0 - 12.0 % Final   • Eosinophil % 11/14/2018 2.0  0.0 - 4.0 % Final   • Basophil % 11/14/2018 0.3  0.0 - 2.0 % Final   • Immature Grans % 11/14/2018 0.4  0.0 - 5.0 % Final   • Neutrophils, Absolute 11/14/2018 7.08  1.87 - 8.40 10*3/mm3 Final   • Lymphocytes, Absolute 11/14/2018 1.70  0.72 - 4.86 10*3/mm3 Final   • Monocytes, Absolute 11/14/2018 0.57  0.19 - 1.30 10*3/mm3 Final   • Eosinophils, Absolute 11/14/2018 0.19  0.00 - 0.70 10*3/mm3 Final   • Basophils, Absolute 11/14/2018 0.03  0.00 - 0.20 10*3/mm3 Final   • Immature Grans, Absolute 11/14/2018 0.04* 0.00 - 0.03 10*3/mm3 Final   • nRBC 11/14/2018 0.0  0.0 - 0.0 /100 WBC Final       RADIOLOGY:  Dexa Bone Density Axial    Result Date: 11/14/2018  Narrative: EXAMINATION: DEXA BONE DENSITY AXIAL- 11/14/2018 11:13 AM CST  HISTORY: 58-year-old female being screened for osteoporosis.  Bone densitometry has been performed using a Hologic Discovery C Model bone densitometer. The routine evaluation includes the lumbar spine and left hip.  The calculated bone density of the femoral neck is 0.643 g/cm2 which corresponds to a T-score of  -1.9 and a Z-score of -0.6.  The T-score corresponds to the number of standard deviations above or below the standard of a 30 year old patient.  The Z-score refers to the number of standard deviations above or below the mean for a person of the same age as this patient.  Evaluation of the lumbar spine demonstrates an average bone mineral density measurement of 1.093 g/cm2 which corresponds to a T-score of 0.4 and a Z-score of 1.7  Comparison: None      Impression: Impression:  1. Osteopenia of the left femoral neck. Low bone mass. The bone density is  between 1.0 and 2.5 standard deviations below the mean for a young adult woman. There is an increased risk of fracture in these patients. This report was finalized on 11/14/2018 11:14 by Dr. Zuhair Walters MD.           Assessment/Plan        Anemia secondary to chronic kidney disease, Stage III. Patient has not required VALERIE therapy this far.  2. Iron deficiency in the setting of chronic kidney disease, has benefitted from Feraheme. Had Feraheme in January, hemoglobin has  now near normalized at 10.3.  Plan continue watchful waiting return to clinic in 3 months time and check another CBC and iron studies.  If the iron is still low I may give additional intravenous iron.  Her CMS guidelines I cannot give her Procrit unless hemoglobin is less than 10.    Clinically patient asymptomatic.  I will redraw her iron studies today.  Hemoglobin is a 10.  Return to clinic in 3 months time.            Marco A Melo MD    11/14/2018    1:33 PM             [facility]  HEMATOLOGY & ONCOLOGY        Subjective     VISIT DIAGNOSIS:   Encounter Diagnoses   Name Primary?   • Anemia in stage 3 chronic kidney disease (CMS/HCC)    • Type 2 diabetes mellitus with stage 3 chronic kidney disease, with long-term current use of insulin (CMS/HCC) Yes       REASON FOR VISIT:     Chief Complaint   Patient presents with   • Chronic Kidney Disease     She is here for f/u visit today x 1 month, review lab work and possible Procrit Injection        HEMATOLOGY / ONCOLOGY HISTORY:Ms. Shrestha is a 55 year old female with past medical history of hypertension, chronic kidney disease, diabetes insulindependent  type 2   No history exists.     [No treatment plan]  Cancer Staging Information:  No matching staging information was found for the patient.      INTERVAL HISTORY  Patient ID: Maria C Shrestha is a 58 y.o. year old female   Anemia secondary to chronic kidney disease, Stage III. Patient has not required VALERIE therapy this far.  2. Iron  deficiency in the setting of chronic kidney disease, has benefitted from Feraheme. Had Feraheme in January, hemoglobin has  now near normalized at 10.7        Review of Systems   Constitutional: Negative.    HENT: Negative.    Eyes: Negative.    Respiratory: Negative.    Cardiovascular: Negative.    Gastrointestinal: Negative.    Endocrine: Negative.    Genitourinary: Negative.    Musculoskeletal: Negative.    Skin: Negative.    Allergic/Immunologic: Negative.    Neurological: Negative.    Hematological: Negative.    Psychiatric/Behavioral: Negative.             Medications:    Current Outpatient Medications   Medication Sig Dispense Refill   • allopurinol (ZYLOPRIM) 100 MG tablet Take 100 mg by mouth 2 (Two) Times a Day.     • amLODIPine (NORVASC) 5 MG tablet      • aspirin 81 MG tablet Take 81 mg by mouth Daily. Stop 7/22/2018     • busPIRone (BUSPAR) 10 MG tablet buspirone 10 mg tablet   Take 2 tablets twice a day by oral route as needed for 90 days.     • carvedilol (COREG) 3.125 MG tablet Take 3.125 mg by mouth 2 (Two) Times a Day.     • cetirizine (zyrTEC) 10 MG tablet Take 10 mg by mouth As Needed.     • Cholecalciferol (VITAMIN D3) 16597 units tablet Vitamin D2 50,000 unit capsule   Take 1 capsule every week by oral route. Sunday     • cinacalcet (SENSIPAR) 30 MG tablet Take 1 tablet by mouth Daily. 30 tablet 11   • citalopram (CeleXA) 20 MG tablet Take 20 mg by mouth Daily.     • diazePAM (VALIUM) 2 MG tablet Take 2 mg by mouth As Needed for Anxiety.     • fluticasone (VERAMYST) 27.5 MCG/SPRAY nasal spray 2 sprays into each nostril Daily.     • furosemide (LASIX) 40 MG tablet Take 1 tablet by mouth Daily. 90 tablet 3   • Glucose Blood (BLOOD GLUCOSE TEST) strip Use 4 x daily, use any brand covered by insurance or same brand as before 360 each 3   • HUMULIN R 500 UNIT/ML CONCENTRATED injection USING A U100 SYRINGE DRAW UP TO THE DIRECTED UNIT ROXI AND INJECT FOUR TIMES A DAY (UP TO 30 UNIT ROXI FOUR TIMES A  "DAY) (Patient taking differently: USING A U100 SYRINGE DRAW UP TO THE DIRECTED UNIT ROXI AND INJECT FOUR TIMES A DAY (UP TO 30 UNIT ROXI FOUR TIMES A DAY)  6 units with meals) 100 mL 3   • ibuprofen (ADVIL,MOTRIN) 800 MG tablet Take 800 mg by mouth Every 8 (Eight) Hours As Needed for Mild Pain .     • insulin glargine (LANTUS) 100 UNIT/ML injection 200 units daily (Patient taking differently: Inject 180 Units under the skin Every Night. 200 units daily) 18 each 3   • Insulin Syringe 31G X 5/16\" 1 ML misc 4 x daily 120 each 11   • lamoTRIgine (LaMICtal) 50 MG tablet dispersible disintegrating tablet Take 50 mg by mouth Every Night.     • Lancets (FREESTYLE) lancets FOUR TIMES A  each 11   • levothyroxine (SYNTHROID, LEVOTHROID) 50 MCG tablet Take 1 tablet by mouth Daily. 90 tablet 1   • lisinopril (PRINIVIL,ZESTRIL) 20 MG tablet Take 20 mg by mouth 2 (Two) Times a Day.     • Multiple Vitamins-Minerals (MULTIVITAMIN ADULT PO) Take 1 tablet by mouth Daily.     • potassium gluconate 595 (99 K) MG tablet tablet potassium gluconate 595 mg (99 mg) tablet   Take 2 tablets every day by oral route.     • rosuvastatin (CRESTOR) 10 MG tablet Take 10 mg by mouth Daily.     • sodium bicarbonate 650 MG tablet Take 650 mg by mouth 3 (Three) Times a Day.     • traMADol (ULTRAM) 50 MG tablet Take 1 tablet by mouth Every 6 (Six) Hours As Needed for Moderate Pain . 8 tablet 0   • TRULICITY 1.5 MG/0.5ML solution pen-injector INJECT 1.5 MG UNDER THE SKIN EVERY 7 DAYS (Patient taking differently: INJECT 1.5 MG UNDER THE SKIN EVERY 7 DAYS Sunday) 6 mL 3   • norethindrone (AYGESTIN) 5 MG tablet Take 1 tablet by mouth Daily. 30 tablet 1     No current facility-administered medications for this visit.        ALLERGIES:    Allergies   Allergen Reactions   • Codeine Nausea Only       Objective      @VITALS    Current Status 11/14/2018   ECOG score 3       General Appearance: Patient is awake, alert, oriented and in no acute distress. " Patient is welldeveloped, wellnourished, and appears stated age.  HEENT: Normocephalic. Sclerae clear, conjunctiva pink, extraocular movements intact, pupils, round, reactive to light and  accommodation. Mouth and throat are clear with moist oral mucosa.  NECK: Supple, no jugular venous distention, thyroid not enlarged.  LYMPH: No cervical, supraclavicular, axillary, or inguinal lymphadenopathy.  CHEST: Equal bilateral expansion, AP  diameter normal, resonant percussion note  LUNGS: Good air movement, no rales, rhonchi, rubs or wheezes with auscultation  CARDIO: Regular sinus rhythm, no murmurs, gallops or rubs.  ABDOMEN: Nondistended, soft, No tenderness, no guarding, no rebound, No hepatosplenomegaly. No abdominal masses. Bowel sounds positive. No hernia  GENITALIA: Not examined.  BREASTS: Not examined.  MUSKEL: No joint swelling, decreased motion, or inflammation  EXTREMS: No edema, clubbing, cyanosis, No varicose veins.  NEURO: Grossly nonfocal, Gait is coordinated and smooth, Cognition is preserved.  SKIN: No rashes, no ecchymoses, no petechia.  PSYCH: Oriented to time, place and person. Memory is preserved. Mood and affect appear normal      RECENT LABS:  Lab on 11/14/2018   Component Date Value Ref Range Status   • WBC 11/14/2018 9.61  4.80 - 10.80 10*3/mm3 Final   • RBC 11/14/2018 3.38* 4.20 - 5.40 10*6/mm3 Final   • Hemoglobin 11/14/2018 10.0* 12.0 - 16.0 g/dL Final   • Hematocrit 11/14/2018 31.3* 37.0 - 47.0 % Final   • MCV 11/14/2018 92.6  82.0 - 98.0 fL Final   • MCH 11/14/2018 29.6  28.0 - 32.0 pg Final   • MCHC 11/14/2018 31.9* 33.0 - 36.0 g/dL Final   • RDW 11/14/2018 17.7* 12.0 - 15.0 % Final   • RDW-SD 11/14/2018 60.9* 40.0 - 54.0 fl Final   • MPV 11/14/2018 10.2  6.0 - 12.0 fL Final   • Platelets 11/14/2018 218  130 - 400 10*3/mm3 Final   • Neutrophil % 11/14/2018 73.7  39.0 - 78.0 % Final   • Lymphocyte % 11/14/2018 17.7  15.0 - 45.0 % Final   • Monocyte % 11/14/2018 5.9  4.0 - 12.0 % Final   •  Eosinophil % 11/14/2018 2.0  0.0 - 4.0 % Final   • Basophil % 11/14/2018 0.3  0.0 - 2.0 % Final   • Immature Grans % 11/14/2018 0.4  0.0 - 5.0 % Final   • Neutrophils, Absolute 11/14/2018 7.08  1.87 - 8.40 10*3/mm3 Final   • Lymphocytes, Absolute 11/14/2018 1.70  0.72 - 4.86 10*3/mm3 Final   • Monocytes, Absolute 11/14/2018 0.57  0.19 - 1.30 10*3/mm3 Final   • Eosinophils, Absolute 11/14/2018 0.19  0.00 - 0.70 10*3/mm3 Final   • Basophils, Absolute 11/14/2018 0.03  0.00 - 0.20 10*3/mm3 Final   • Immature Grans, Absolute 11/14/2018 0.04* 0.00 - 0.03 10*3/mm3 Final   • nRBC 11/14/2018 0.0  0.0 - 0.0 /100 WBC Final       RADIOLOGY:  Dexa Bone Density Axial    Result Date: 11/14/2018  Narrative: EXAMINATION: DEXA BONE DENSITY AXIAL- 11/14/2018 11:13 AM CST  HISTORY: 58-year-old female being screened for osteoporosis.  Bone densitometry has been performed using a Hologic Discovery C Model bone densitometer. The routine evaluation includes the lumbar spine and left hip.  The calculated bone density of the femoral neck is 0.643 g/cm2 which corresponds to a T-score of  -1.9 and a Z-score of -0.6.  The T-score corresponds to the number of standard deviations above or below the standard of a 30 year old patient.  The Z-score refers to the number of standard deviations above or below the mean for a person of the same age as this patient.  Evaluation of the lumbar spine demonstrates an average bone mineral density measurement of 1.093 g/cm2 which corresponds to a T-score of 0.4 and a Z-score of 1.7  Comparison: None      Impression: Impression:  1. Osteopenia of the left femoral neck. Low bone mass. The bone density is between 1.0 and 2.5 standard deviations below the mean for a young adult woman. There is an increased risk of fracture in these patients. This report was finalized on 11/14/2018 11:14 by Dr. Zuhair Walters MD.           Assessment/Plan        Anemia secondary to chronic kidney disease, Stage III. .  2. Iron  deficiency in the setting of chronic kidney disease, has benefitted from Feraheme. Had Feraheme in January, hemoglobin has  now near normalized at 10.1.  Plan continue watchful waiting return to clinic in 3 months time and check another CBC and iron studies.  If the iron is still low I may give additional intravenous iron.  .    Clinically patient asymptomatic.  I will redraw her iron studies today.  Hemoglobin is a 9.7gm Today. Per CMS guidelines Rx Procrit 40,000 u s/q every month.    She has been bleeding per vaginum and is planned D & C. Heavy set Obese women are more prone to Uterine carcinomas, therefore, will check , 9 especially when she tells me her grand mom had Uterine CA ).  Awaiting D&C tommorow. My cut off for blood xfusion is a Ghb of 7gm%. She is 7.6. Because of Bleeding I fear she may be low in Iron. Will check Iron panel today, if low will infuse Ferraheme and then Rx with Procrit for CKD.  1 wk ago she had HSBO, no malignency. Hgb after 2 doses of Iron upto 10.0. Procrit today.       Marco A Melo MD    11/14/2018    1:33 PM

## 2018-11-29 RX ORDER — PEN NEEDLE, DIABETIC 29 G X1/2"
NEEDLE, DISPOSABLE MISCELLANEOUS
Qty: 100 EACH | Refills: 3 | OUTPATIENT
Start: 2018-11-29

## 2018-12-12 ENCOUNTER — LAB (OUTPATIENT)
Dept: LAB | Facility: HOSPITAL | Age: 58
End: 2018-12-12
Attending: INTERNAL MEDICINE

## 2018-12-12 ENCOUNTER — OFFICE VISIT (OUTPATIENT)
Dept: ONCOLOGY | Facility: CLINIC | Age: 58
End: 2018-12-12

## 2018-12-12 ENCOUNTER — INFUSION (OUTPATIENT)
Dept: ONCOLOGY | Facility: HOSPITAL | Age: 58
End: 2018-12-12
Attending: INTERNAL MEDICINE

## 2018-12-12 VITALS
SYSTOLIC BLOOD PRESSURE: 162 MMHG | HEART RATE: 76 BPM | OXYGEN SATURATION: 98 % | TEMPERATURE: 98.3 F | DIASTOLIC BLOOD PRESSURE: 68 MMHG | WEIGHT: 293 LBS | HEIGHT: 65 IN | BODY MASS INDEX: 48.82 KG/M2 | RESPIRATION RATE: 16 BRPM

## 2018-12-12 DIAGNOSIS — Z79.4 TYPE 2 DIABETES MELLITUS WITH STAGE 3 CHRONIC KIDNEY DISEASE, WITH LONG-TERM CURRENT USE OF INSULIN (HCC): Primary | ICD-10-CM

## 2018-12-12 DIAGNOSIS — D63.1 ANEMIA IN STAGE 3 CHRONIC KIDNEY DISEASE (HCC): Primary | ICD-10-CM

## 2018-12-12 DIAGNOSIS — N18.30 TYPE 2 DIABETES MELLITUS WITH STAGE 3 CHRONIC KIDNEY DISEASE, WITH LONG-TERM CURRENT USE OF INSULIN (HCC): Primary | ICD-10-CM

## 2018-12-12 DIAGNOSIS — N18.30 ANEMIA IN STAGE 3 CHRONIC KIDNEY DISEASE (HCC): ICD-10-CM

## 2018-12-12 DIAGNOSIS — E11.22 TYPE 2 DIABETES MELLITUS WITH STAGE 3 CHRONIC KIDNEY DISEASE, WITH LONG-TERM CURRENT USE OF INSULIN (HCC): Primary | ICD-10-CM

## 2018-12-12 DIAGNOSIS — N18.30 ANEMIA IN STAGE 3 CHRONIC KIDNEY DISEASE (HCC): Primary | ICD-10-CM

## 2018-12-12 DIAGNOSIS — D63.1 ANEMIA IN STAGE 3 CHRONIC KIDNEY DISEASE (HCC): ICD-10-CM

## 2018-12-12 LAB
ALBUMIN SERPL-MCNC: 3.7 G/DL (ref 3.5–5)
ALBUMIN/GLOB SERPL: 1.1 G/DL (ref 1.1–2.5)
ALP SERPL-CCNC: 90 U/L (ref 24–120)
ALT SERPL W P-5'-P-CCNC: <15 U/L (ref 0–54)
ANION GAP SERPL CALCULATED.3IONS-SCNC: 11 MMOL/L (ref 4–13)
AST SERPL-CCNC: 22 U/L (ref 7–45)
BASOPHILS # BLD AUTO: 0.04 10*3/MM3 (ref 0–0.2)
BASOPHILS NFR BLD AUTO: 0.4 % (ref 0–2)
BILIRUB SERPL-MCNC: 0.3 MG/DL (ref 0.1–1)
BUN BLD-MCNC: 49 MG/DL (ref 5–21)
BUN/CREAT SERPL: 23.4 (ref 7–25)
CALCIUM SPEC-SCNC: 8.1 MG/DL (ref 8.4–10.4)
CHLORIDE SERPL-SCNC: 104 MMOL/L (ref 98–110)
CO2 SERPL-SCNC: 26 MMOL/L (ref 24–31)
CREAT BLD-MCNC: 2.09 MG/DL (ref 0.5–1.4)
DEPRECATED RDW RBC AUTO: 57.3 FL (ref 40–54)
EOSINOPHIL # BLD AUTO: 0.24 10*3/MM3 (ref 0–0.7)
EOSINOPHIL NFR BLD AUTO: 2.4 % (ref 0–4)
ERYTHROCYTE [DISTWIDTH] IN BLOOD BY AUTOMATED COUNT: 17.3 % (ref 12–15)
GFR SERPL CREATININE-BSD FRML MDRD: 24 ML/MIN/1.73
GLOBULIN UR ELPH-MCNC: 3.3 GM/DL
GLUCOSE BLD-MCNC: 186 MG/DL (ref 70–100)
HCT VFR BLD AUTO: 30.2 % (ref 37–47)
HGB BLD-MCNC: 9.9 G/DL (ref 12–16)
HOLD SPECIMEN: NORMAL
HOLD SPECIMEN: NORMAL
IMM GRANULOCYTES # BLD: 0.06 10*3/MM3 (ref 0–0.03)
IMM GRANULOCYTES NFR BLD: 0.6 % (ref 0–5)
LYMPHOCYTES # BLD AUTO: 1.8 10*3/MM3 (ref 0.72–4.86)
LYMPHOCYTES NFR BLD AUTO: 18.1 % (ref 15–45)
MCH RBC QN AUTO: 29.6 PG (ref 28–32)
MCHC RBC AUTO-ENTMCNC: 32.8 G/DL (ref 33–36)
MCV RBC AUTO: 90.4 FL (ref 82–98)
MONOCYTES # BLD AUTO: 0.56 10*3/MM3 (ref 0.19–1.3)
MONOCYTES NFR BLD AUTO: 5.6 % (ref 4–12)
NEUTROPHILS # BLD AUTO: 7.22 10*3/MM3 (ref 1.87–8.4)
NEUTROPHILS NFR BLD AUTO: 72.9 % (ref 39–78)
NRBC BLD MANUAL-RTO: 0 /100 WBC (ref 0–0)
PLATELET # BLD AUTO: 238 10*3/MM3 (ref 130–400)
PMV BLD AUTO: 10.6 FL (ref 6–12)
POTASSIUM BLD-SCNC: 4.2 MMOL/L (ref 3.5–5.3)
PROT SERPL-MCNC: 7 G/DL (ref 6.3–8.7)
RBC # BLD AUTO: 3.34 10*6/MM3 (ref 4.2–5.4)
SODIUM BLD-SCNC: 141 MMOL/L (ref 135–145)
WBC NRBC COR # BLD: 9.92 10*3/MM3 (ref 4.8–10.8)

## 2018-12-12 PROCEDURE — 96372 THER/PROPH/DIAG INJ SC/IM: CPT

## 2018-12-12 PROCEDURE — 99214 OFFICE O/P EST MOD 30 MIN: CPT | Performed by: INTERNAL MEDICINE

## 2018-12-12 PROCEDURE — 36415 COLL VENOUS BLD VENIPUNCTURE: CPT

## 2018-12-12 PROCEDURE — 80053 COMPREHEN METABOLIC PANEL: CPT

## 2018-12-12 PROCEDURE — 85025 COMPLETE CBC W/AUTO DIFF WBC: CPT

## 2018-12-12 PROCEDURE — 25010000002 EPOETIN ALFA PER 1000 UNITS: Performed by: INTERNAL MEDICINE

## 2018-12-12 RX ADMIN — ERYTHROPOIETIN 40000 UNITS: 40000 INJECTION, SOLUTION INTRAVENOUS; SUBCUTANEOUS at 14:33

## 2018-12-12 NOTE — PROGRESS NOTES
[facility]  HEMATOLOGY & ONCOLOGY        Subjective     VISIT DIAGNOSIS:   Encounter Diagnoses   Name Primary?   • Anemia in stage 3 chronic kidney disease (CMS/HCC)    • Type 2 diabetes mellitus with stage 3 chronic kidney disease, with long-term current use of insulin (CMS/HCC) Yes       REASON FOR VISIT:     No chief complaint on file.       HEMATOLOGY / ONCOLOGY HISTORY:Ms. Shrestha is a 55 year old female with past medical history of hypertension, chronic kidney disease, diabetes insulindependent  type 2   No history exists.     [No treatment plan]  Cancer Staging Information:  No matching staging information was found for the patient.      INTERVAL HISTORY  Patient ID: Maria C Shrestha is a 58 y.o. year old female   Anemia secondary to chronic kidney disease, Stage III. Patient has not required VALERIE therapy this far.  2. Iron deficiency in the setting of chronic kidney disease, has benefitted from Feraheme. Had Feraheme in January, hemoglobin has  now near normalized at 10.7        Review of Systems   Constitutional: Negative.    HENT: Negative.    Eyes: Negative.    Respiratory: Negative.    Cardiovascular: Negative.    Gastrointestinal: Negative.    Endocrine: Negative.    Genitourinary: Negative.    Musculoskeletal: Negative.    Skin: Negative.    Allergic/Immunologic: Negative.    Neurological: Negative.    Hematological: Negative.    Psychiatric/Behavioral: Negative.             Medications:    Current Outpatient Medications   Medication Sig Dispense Refill   • allopurinol (ZYLOPRIM) 100 MG tablet Take 100 mg by mouth 2 (Two) Times a Day.     • amLODIPine (NORVASC) 5 MG tablet      • aspirin 81 MG tablet Take 81 mg by mouth Daily. Stop 7/22/2018     • busPIRone (BUSPAR) 10 MG tablet buspirone 10 mg tablet   Take 2 tablets twice a day by oral route as needed for 90 days.     • carvedilol (COREG) 3.125 MG tablet Take 3.125 mg by mouth 2 (Two) Times a Day.     • cetirizine (zyrTEC) 10 MG tablet  "Take 10 mg by mouth As Needed.     • Cholecalciferol (VITAMIN D3) 81203 units tablet Vitamin D2 50,000 unit capsule   Take 1 capsule every week by oral route. Sunday     • cinacalcet (SENSIPAR) 30 MG tablet Take 1 tablet by mouth Daily. 30 tablet 11   • citalopram (CeleXA) 20 MG tablet Take 20 mg by mouth Daily.     • diazePAM (VALIUM) 2 MG tablet Take 2 mg by mouth As Needed for Anxiety.     • fluticasone (VERAMYST) 27.5 MCG/SPRAY nasal spray 2 sprays into each nostril Daily.     • furosemide (LASIX) 40 MG tablet Take 1 tablet by mouth Daily. 90 tablet 3   • Glucose Blood (BLOOD GLUCOSE TEST) strip Use 4 x daily, use any brand covered by insurance or same brand as before 360 each 3   • HUMULIN R 500 UNIT/ML CONCENTRATED injection USING A U100 SYRINGE DRAW UP TO THE DIRECTED UNIT ROXI AND INJECT FOUR TIMES A DAY (UP TO 30 UNIT ROXI FOUR TIMES A DAY) (Patient taking differently: USING A U100 SYRINGE DRAW UP TO THE DIRECTED UNIT ROXI AND INJECT FOUR TIMES A DAY (UP TO 30 UNIT ROXI FOUR TIMES A DAY)  6 units with meals) 100 mL 3   • ibuprofen (ADVIL,MOTRIN) 800 MG tablet Take 800 mg by mouth Every 8 (Eight) Hours As Needed for Mild Pain .     • insulin glargine (LANTUS) 100 UNIT/ML injection 200 units daily (Patient taking differently: Inject 180 Units under the skin Every Night. 200 units daily) 18 each 3   • Insulin Syringe 31G X 5/16\" 1 ML misc 4 x daily 120 each 11   • lamoTRIgine (LaMICtal) 50 MG tablet dispersible disintegrating tablet Take 50 mg by mouth Every Night.     • Lancets (FREESTYLE) lancets FOUR TIMES A  each 11   • levothyroxine (SYNTHROID, LEVOTHROID) 50 MCG tablet Take 1 tablet by mouth Daily. 90 tablet 1   • lisinopril (PRINIVIL,ZESTRIL) 20 MG tablet Take 20 mg by mouth 2 (Two) Times a Day.     • Multiple Vitamins-Minerals (MULTIVITAMIN ADULT PO) Take 1 tablet by mouth Daily.     • norethindrone (AYGESTIN) 5 MG tablet Take 1 tablet by mouth Daily. 30 tablet 1   • potassium gluconate 595 (99 " K) MG tablet tablet potassium gluconate 595 mg (99 mg) tablet   Take 2 tablets every day by oral route.     • rosuvastatin (CRESTOR) 10 MG tablet Take 10 mg by mouth Daily.     • sodium bicarbonate 650 MG tablet Take 650 mg by mouth 3 (Three) Times a Day.     • traMADol (ULTRAM) 50 MG tablet Take 1 tablet by mouth Every 6 (Six) Hours As Needed for Moderate Pain . 8 tablet 0   • TRULICITY 1.5 MG/0.5ML solution pen-injector INJECT 1.5 MG UNDER THE SKIN EVERY 7 DAYS (Patient taking differently: INJECT 1.5 MG UNDER THE SKIN EVERY 7 DAYS Sunday) 6 mL 3     No current facility-administered medications for this visit.        ALLERGIES:    Allergies   Allergen Reactions   • Codeine Nausea Only       Objective      @VITALS    Current Status 11/14/2018   ECOG score 3       General Appearance: Patient is awake, alert, oriented and in no acute distress. Patient is welldeveloped, wellnourished, and appears stated age.  HEENT: Normocephalic. Sclerae clear, conjunctiva pink, extraocular movements intact, pupils, round, reactive to light and  accommodation. Mouth and throat are clear with moist oral mucosa.  NECK: Supple, no jugular venous distention, thyroid not enlarged.  LYMPH: No cervical, supraclavicular, axillary, or inguinal lymphadenopathy.  CHEST: Equal bilateral expansion, AP  diameter normal, resonant percussion note  LUNGS: Good air movement, no rales, rhonchi, rubs or wheezes with auscultation  CARDIO: Regular sinus rhythm, no murmurs, gallops or rubs.  ABDOMEN: Nondistended, soft, No tenderness, no guarding, no rebound, No hepatosplenomegaly. No abdominal masses. Bowel sounds positive. No hernia  GENITALIA: Not examined.  BREASTS: Not examined.  MUSKEL: No joint swelling, decreased motion, or inflammation  EXTREMS: No edema, clubbing, cyanosis, No varicose veins.  NEURO: Grossly nonfocal, Gait is coordinated and smooth, Cognition is preserved.  SKIN: No rashes, no ecchymoses, no petechia.  PSYCH: Oriented to time,  place and person. Memory is preserved. Mood and affect appear normal      RECENT LABS:  Lab on 12/12/2018   Component Date Value Ref Range Status   • WBC 12/12/2018 9.92  4.80 - 10.80 10*3/mm3 Final   • RBC 12/12/2018 3.34* 4.20 - 5.40 10*6/mm3 Final   • Hemoglobin 12/12/2018 9.9* 12.0 - 16.0 g/dL Final   • Hematocrit 12/12/2018 30.2* 37.0 - 47.0 % Final   • MCV 12/12/2018 90.4  82.0 - 98.0 fL Final   • MCH 12/12/2018 29.6  28.0 - 32.0 pg Final   • MCHC 12/12/2018 32.8* 33.0 - 36.0 g/dL Final   • RDW 12/12/2018 17.3* 12.0 - 15.0 % Final   • RDW-SD 12/12/2018 57.3* 40.0 - 54.0 fl Final   • MPV 12/12/2018 10.6  6.0 - 12.0 fL Final   • Platelets 12/12/2018 238  130 - 400 10*3/mm3 Final   • Neutrophil % 12/12/2018 72.9  39.0 - 78.0 % Final   • Lymphocyte % 12/12/2018 18.1  15.0 - 45.0 % Final   • Monocyte % 12/12/2018 5.6  4.0 - 12.0 % Final   • Eosinophil % 12/12/2018 2.4  0.0 - 4.0 % Final   • Basophil % 12/12/2018 0.4  0.0 - 2.0 % Final   • Immature Grans % 12/12/2018 0.6  0.0 - 5.0 % Final   • Neutrophils, Absolute 12/12/2018 7.22  1.87 - 8.40 10*3/mm3 Final   • Lymphocytes, Absolute 12/12/2018 1.80  0.72 - 4.86 10*3/mm3 Final   • Monocytes, Absolute 12/12/2018 0.56  0.19 - 1.30 10*3/mm3 Final   • Eosinophils, Absolute 12/12/2018 0.24  0.00 - 0.70 10*3/mm3 Final   • Basophils, Absolute 12/12/2018 0.04  0.00 - 0.20 10*3/mm3 Final   • Immature Grans, Absolute 12/12/2018 0.06* 0.00 - 0.03 10*3/mm3 Final   • nRBC 12/12/2018 0.0  0.0 - 0.0 /100 WBC Final       RADIOLOGY:  Dexa Bone Density Axial    Result Date: 11/14/2018  Narrative: EXAMINATION: DEXA BONE DENSITY AXIAL- 11/14/2018 11:13 AM CST  HISTORY: 58-year-old female being screened for osteoporosis.  Bone densitometry has been performed using a [employer] Discovery C Model bone densitometer. The routine evaluation includes the lumbar spine and left hip.  The calculated bone density of the femoral neck is 0.643 g/cm2 which corresponds to a T-score of  -1.9 and a  Z-score of -0.6.  The T-score corresponds to the number of standard deviations above or below the standard of a 30 year old patient.  The Z-score refers to the number of standard deviations above or below the mean for a person of the same age as this patient.  Evaluation of the lumbar spine demonstrates an average bone mineral density measurement of 1.093 g/cm2 which corresponds to a T-score of 0.4 and a Z-score of 1.7  Comparison: None      Impression: Impression:  1. Osteopenia of the left femoral neck. Low bone mass. The bone density is between 1.0 and 2.5 standard deviations below the mean for a young adult woman. There is an increased risk of fracture in these patients. This report was finalized on 11/14/2018 11:14 by Dr. Zuhair Walters MD.           Assessment/Plan        Anemia secondary to chronic kidney disease, Stage III. Patient has not required VALERIE therapy this far.  2. Iron deficiency in the setting of chronic kidney disease, has benefitted from Feraheme. Had Feraheme in January, hemoglobin has  now near normalized at 10.3.  Plan continue watchful waiting return to clinic in 3 months time and check another CBC and iron studies.  If the iron is still low I may give additional intravenous iron.  Her CMS guidelines I cannot give her Procrit unless hemoglobin is less than 10.    Clinically patient asymptomatic.  I will redraw her iron studies today.  Hemoglobin is a 10.  Return to clinic in 3 months time.            Marco A Melo MD    12/12/2018    1:40 PM             [facility]  HEMATOLOGY & ONCOLOGY        Subjective     VISIT DIAGNOSIS:   Encounter Diagnoses   Name Primary?   • Anemia in stage 3 chronic kidney disease (CMS/HCC)    • Type 2 diabetes mellitus with stage 3 chronic kidney disease, with long-term current use of insulin (CMS/HCC) Yes       REASON FOR VISIT:     No chief complaint on file.       HEMATOLOGY / ONCOLOGY HISTORY:Ms. Shrestha is a 55 year old female with past  medical history of hypertension, chronic kidney disease, diabetes insulindependent  type 2   No history exists.     [No treatment plan]  Cancer Staging Information:  No matching staging information was found for the patient.      INTERVAL HISTORY  Patient ID: Maria C Shrestha is a 58 y.o. year old female   Anemia secondary to chronic kidney disease, Stage III. Patient has not required VALERIE therapy this far.  2. Iron deficiency in the setting of chronic kidney disease, has benefitted from Feraheme. Had Feraheme in January, hemoglobin has  now near normalized at 10.7        Review of Systems   Constitutional: Negative.    HENT: Negative.    Eyes: Negative.    Respiratory: Negative.    Cardiovascular: Negative.    Gastrointestinal: Negative.    Endocrine: Negative.    Genitourinary: Negative.    Musculoskeletal: Negative.    Skin: Negative.    Allergic/Immunologic: Negative.    Neurological: Negative.    Hematological: Negative.    Psychiatric/Behavioral: Negative.             Medications:    Current Outpatient Medications   Medication Sig Dispense Refill   • allopurinol (ZYLOPRIM) 100 MG tablet Take 100 mg by mouth 2 (Two) Times a Day.     • amLODIPine (NORVASC) 5 MG tablet      • aspirin 81 MG tablet Take 81 mg by mouth Daily. Stop 7/22/2018     • busPIRone (BUSPAR) 10 MG tablet buspirone 10 mg tablet   Take 2 tablets twice a day by oral route as needed for 90 days.     • carvedilol (COREG) 3.125 MG tablet Take 3.125 mg by mouth 2 (Two) Times a Day.     • cetirizine (zyrTEC) 10 MG tablet Take 10 mg by mouth As Needed.     • Cholecalciferol (VITAMIN D3) 05787 units tablet Vitamin D2 50,000 unit capsule   Take 1 capsule every week by oral route. Sunday     • cinacalcet (SENSIPAR) 30 MG tablet Take 1 tablet by mouth Daily. 30 tablet 11   • citalopram (CeleXA) 20 MG tablet Take 20 mg by mouth Daily.     • diazePAM (VALIUM) 2 MG tablet Take 2 mg by mouth As Needed for Anxiety.     • fluticasone (VERAMYST) 27.5 MCG/SPRAY nasal  "spray 2 sprays into each nostril Daily.     • furosemide (LASIX) 40 MG tablet Take 1 tablet by mouth Daily. 90 tablet 3   • Glucose Blood (BLOOD GLUCOSE TEST) strip Use 4 x daily, use any brand covered by insurance or same brand as before 360 each 3   • HUMULIN R 500 UNIT/ML CONCENTRATED injection USING A U100 SYRINGE DRAW UP TO THE DIRECTED UNIT ROXI AND INJECT FOUR TIMES A DAY (UP TO 30 UNIT ROXI FOUR TIMES A DAY) (Patient taking differently: USING A U100 SYRINGE DRAW UP TO THE DIRECTED UNIT ROXI AND INJECT FOUR TIMES A DAY (UP TO 30 UNIT ROXI FOUR TIMES A DAY)  6 units with meals) 100 mL 3   • ibuprofen (ADVIL,MOTRIN) 800 MG tablet Take 800 mg by mouth Every 8 (Eight) Hours As Needed for Mild Pain .     • insulin glargine (LANTUS) 100 UNIT/ML injection 200 units daily (Patient taking differently: Inject 180 Units under the skin Every Night. 200 units daily) 18 each 3   • Insulin Syringe 31G X 5/16\" 1 ML misc 4 x daily 120 each 11   • lamoTRIgine (LaMICtal) 50 MG tablet dispersible disintegrating tablet Take 50 mg by mouth Every Night.     • Lancets (FREESTYLE) lancets FOUR TIMES A  each 11   • levothyroxine (SYNTHROID, LEVOTHROID) 50 MCG tablet Take 1 tablet by mouth Daily. 90 tablet 1   • lisinopril (PRINIVIL,ZESTRIL) 20 MG tablet Take 20 mg by mouth 2 (Two) Times a Day.     • Multiple Vitamins-Minerals (MULTIVITAMIN ADULT PO) Take 1 tablet by mouth Daily.     • norethindrone (AYGESTIN) 5 MG tablet Take 1 tablet by mouth Daily. 30 tablet 1   • potassium gluconate 595 (99 K) MG tablet tablet potassium gluconate 595 mg (99 mg) tablet   Take 2 tablets every day by oral route.     • rosuvastatin (CRESTOR) 10 MG tablet Take 10 mg by mouth Daily.     • sodium bicarbonate 650 MG tablet Take 650 mg by mouth 3 (Three) Times a Day.     • traMADol (ULTRAM) 50 MG tablet Take 1 tablet by mouth Every 6 (Six) Hours As Needed for Moderate Pain . 8 tablet 0   • TRULICITY 1.5 MG/0.5ML solution pen-injector INJECT 1.5 MG " UNDER THE SKIN EVERY 7 DAYS (Patient taking differently: INJECT 1.5 MG UNDER THE SKIN EVERY 7 DAYS Sunday) 6 mL 3     No current facility-administered medications for this visit.        ALLERGIES:    Allergies   Allergen Reactions   • Codeine Nausea Only       Objective      @VITALS    Current Status 11/14/2018   ECOG score 3       General Appearance: Patient is awake, alert, oriented and in no acute distress. Patient is welldeveloped, wellnourished, and appears stated age.  HEENT: Normocephalic. Sclerae clear, conjunctiva pink, extraocular movements intact, pupils, round, reactive to light and  accommodation. Mouth and throat are clear with moist oral mucosa.  NECK: Supple, no jugular venous distention, thyroid not enlarged.  LYMPH: No cervical, supraclavicular, axillary, or inguinal lymphadenopathy.  CHEST: Equal bilateral expansion, AP  diameter normal, resonant percussion note  LUNGS: Good air movement, no rales, rhonchi, rubs or wheezes with auscultation  CARDIO: Regular sinus rhythm, no murmurs, gallops or rubs.  ABDOMEN: Nondistended, soft, No tenderness, no guarding, no rebound, No hepatosplenomegaly. No abdominal masses. Bowel sounds positive. No hernia  GENITALIA: Not examined.  BREASTS: Not examined.  MUSKEL: No joint swelling, decreased motion, or inflammation  EXTREMS: No edema, clubbing, cyanosis, No varicose veins.  NEURO: Grossly nonfocal, Gait is coordinated and smooth, Cognition is preserved.  SKIN: No rashes, no ecchymoses, no petechia.  PSYCH: Oriented to time, place and person. Memory is preserved. Mood and affect appear normal      RECENT LABS:  Lab on 12/12/2018   Component Date Value Ref Range Status   • WBC 12/12/2018 9.92  4.80 - 10.80 10*3/mm3 Final   • RBC 12/12/2018 3.34* 4.20 - 5.40 10*6/mm3 Final   • Hemoglobin 12/12/2018 9.9* 12.0 - 16.0 g/dL Final   • Hematocrit 12/12/2018 30.2* 37.0 - 47.0 % Final   • MCV 12/12/2018 90.4  82.0 - 98.0 fL Final   • MCH 12/12/2018 29.6  28.0 - 32.0 pg  Final   • MCHC 12/12/2018 32.8* 33.0 - 36.0 g/dL Final   • RDW 12/12/2018 17.3* 12.0 - 15.0 % Final   • RDW-SD 12/12/2018 57.3* 40.0 - 54.0 fl Final   • MPV 12/12/2018 10.6  6.0 - 12.0 fL Final   • Platelets 12/12/2018 238  130 - 400 10*3/mm3 Final   • Neutrophil % 12/12/2018 72.9  39.0 - 78.0 % Final   • Lymphocyte % 12/12/2018 18.1  15.0 - 45.0 % Final   • Monocyte % 12/12/2018 5.6  4.0 - 12.0 % Final   • Eosinophil % 12/12/2018 2.4  0.0 - 4.0 % Final   • Basophil % 12/12/2018 0.4  0.0 - 2.0 % Final   • Immature Grans % 12/12/2018 0.6  0.0 - 5.0 % Final   • Neutrophils, Absolute 12/12/2018 7.22  1.87 - 8.40 10*3/mm3 Final   • Lymphocytes, Absolute 12/12/2018 1.80  0.72 - 4.86 10*3/mm3 Final   • Monocytes, Absolute 12/12/2018 0.56  0.19 - 1.30 10*3/mm3 Final   • Eosinophils, Absolute 12/12/2018 0.24  0.00 - 0.70 10*3/mm3 Final   • Basophils, Absolute 12/12/2018 0.04  0.00 - 0.20 10*3/mm3 Final   • Immature Grans, Absolute 12/12/2018 0.06* 0.00 - 0.03 10*3/mm3 Final   • nRBC 12/12/2018 0.0  0.0 - 0.0 /100 WBC Final       RADIOLOGY:  Dexa Bone Density Axial    Result Date: 11/14/2018  Narrative: EXAMINATION: DEXA BONE DENSITY AXIAL- 11/14/2018 11:13 AM CST  HISTORY: 58-year-old female being screened for osteoporosis.  Bone densitometry has been performed using a [employer] Discovery C Model bone densitometer. The routine evaluation includes the lumbar spine and left hip.  The calculated bone density of the femoral neck is 0.643 g/cm2 which corresponds to a T-score of  -1.9 and a Z-score of -0.6.  The T-score corresponds to the number of standard deviations above or below the standard of a 30 year old patient.  The Z-score refers to the number of standard deviations above or below the mean for a person of the same age as this patient.  Evaluation of the lumbar spine demonstrates an average bone mineral density measurement of 1.093 g/cm2 which corresponds to a T-score of 0.4 and a Z-score of 1.7  Comparison: None       Impression: Impression:  1. Osteopenia of the left femoral neck. Low bone mass. The bone density is between 1.0 and 2.5 standard deviations below the mean for a young adult woman. There is an increased risk of fracture in these patients. This report was finalized on 11/14/2018 11:14 by Dr. Zuhair Walters MD.           Assessment/Plan        Anemia secondary to chronic kidney disease, Stage III. Patient has not required VALERIE therapy this far.  2. Iron deficiency in the setting of chronic kidney disease, has benefitted from Feraheme. Had Feraheme in January, hemoglobin has  now near normalized at 10.2.  Plan continue watchful waiting return to clinic in 3 months time and check another CBC and iron studies.  If the iron is still low I may give additional intravenous iron.  .    Clinically patient asymptomatic.  I will redraw her iron studies today.  Hemoglobin is a 10.3gm Today. Per CMS guidelines Rx Procrit 40,000 u s/q every month.       Marco A Melo MD    12/12/2018    1:40 PM             [facility]  HEMATOLOGY & ONCOLOGY        Subjective     VISIT DIAGNOSIS:   Encounter Diagnoses   Name Primary?   • Anemia in stage 3 chronic kidney disease (CMS/HCC)    • Type 2 diabetes mellitus with stage 3 chronic kidney disease, with long-term current use of insulin (CMS/HCC) Yes       REASON FOR VISIT:     No chief complaint on file.       HEMATOLOGY / ONCOLOGY HISTORY:Ms. Shrestha is a 55 year old female with past medical history of hypertension, chronic kidney disease, diabetes insulindependent  type 2   No history exists.     [No treatment plan]  Cancer Staging Information:  No matching staging information was found for the patient.      INTERVAL HISTORY  Patient ID: Maria C Shrestha is a 58 y.o. year old female   Anemia secondary to chronic kidney disease, Stage III. Patient has not required VALERIE therapy this far.  2. Iron deficiency in the setting of chronic kidney disease, has benefitted from Feraheme.  Had Feraheme in January, hemoglobin has  now near normalized at 10.7        Review of Systems   Constitutional: Negative.    HENT: Negative.    Eyes: Negative.    Respiratory: Negative.    Cardiovascular: Negative.    Gastrointestinal: Negative.    Endocrine: Negative.    Genitourinary: Negative.    Musculoskeletal: Negative.    Skin: Negative.    Allergic/Immunologic: Negative.    Neurological: Negative.    Hematological: Negative.    Psychiatric/Behavioral: Negative.             Medications:    Current Outpatient Medications   Medication Sig Dispense Refill   • allopurinol (ZYLOPRIM) 100 MG tablet Take 100 mg by mouth 2 (Two) Times a Day.     • amLODIPine (NORVASC) 5 MG tablet      • aspirin 81 MG tablet Take 81 mg by mouth Daily. Stop 7/22/2018     • busPIRone (BUSPAR) 10 MG tablet buspirone 10 mg tablet   Take 2 tablets twice a day by oral route as needed for 90 days.     • carvedilol (COREG) 3.125 MG tablet Take 3.125 mg by mouth 2 (Two) Times a Day.     • cetirizine (zyrTEC) 10 MG tablet Take 10 mg by mouth As Needed.     • Cholecalciferol (VITAMIN D3) 62558 units tablet Vitamin D2 50,000 unit capsule   Take 1 capsule every week by oral route. Sunday     • cinacalcet (SENSIPAR) 30 MG tablet Take 1 tablet by mouth Daily. 30 tablet 11   • citalopram (CeleXA) 20 MG tablet Take 20 mg by mouth Daily.     • diazePAM (VALIUM) 2 MG tablet Take 2 mg by mouth As Needed for Anxiety.     • fluticasone (VERAMYST) 27.5 MCG/SPRAY nasal spray 2 sprays into each nostril Daily.     • furosemide (LASIX) 40 MG tablet Take 1 tablet by mouth Daily. 90 tablet 3   • Glucose Blood (BLOOD GLUCOSE TEST) strip Use 4 x daily, use any brand covered by insurance or same brand as before 360 each 3   • HUMULIN R 500 UNIT/ML CONCENTRATED injection USING A U100 SYRINGE DRAW UP TO THE DIRECTED UNIT ROXI AND INJECT FOUR TIMES A DAY (UP TO 30 UNIT ROXI FOUR TIMES A DAY) (Patient taking differently: USING A U100 SYRINGE DRAW UP TO THE DIRECTED UNIT  "ROXI AND INJECT FOUR TIMES A DAY (UP TO 30 UNIT ROXI FOUR TIMES A DAY)  6 units with meals) 100 mL 3   • ibuprofen (ADVIL,MOTRIN) 800 MG tablet Take 800 mg by mouth Every 8 (Eight) Hours As Needed for Mild Pain .     • insulin glargine (LANTUS) 100 UNIT/ML injection 200 units daily (Patient taking differently: Inject 180 Units under the skin Every Night. 200 units daily) 18 each 3   • Insulin Syringe 31G X 5/16\" 1 ML misc 4 x daily 120 each 11   • lamoTRIgine (LaMICtal) 50 MG tablet dispersible disintegrating tablet Take 50 mg by mouth Every Night.     • Lancets (FREESTYLE) lancets FOUR TIMES A  each 11   • levothyroxine (SYNTHROID, LEVOTHROID) 50 MCG tablet Take 1 tablet by mouth Daily. 90 tablet 1   • lisinopril (PRINIVIL,ZESTRIL) 20 MG tablet Take 20 mg by mouth 2 (Two) Times a Day.     • Multiple Vitamins-Minerals (MULTIVITAMIN ADULT PO) Take 1 tablet by mouth Daily.     • norethindrone (AYGESTIN) 5 MG tablet Take 1 tablet by mouth Daily. 30 tablet 1   • potassium gluconate 595 (99 K) MG tablet tablet potassium gluconate 595 mg (99 mg) tablet   Take 2 tablets every day by oral route.     • rosuvastatin (CRESTOR) 10 MG tablet Take 10 mg by mouth Daily.     • sodium bicarbonate 650 MG tablet Take 650 mg by mouth 3 (Three) Times a Day.     • traMADol (ULTRAM) 50 MG tablet Take 1 tablet by mouth Every 6 (Six) Hours As Needed for Moderate Pain . 8 tablet 0   • TRULICITY 1.5 MG/0.5ML solution pen-injector INJECT 1.5 MG UNDER THE SKIN EVERY 7 DAYS (Patient taking differently: INJECT 1.5 MG UNDER THE SKIN EVERY 7 DAYS Sunday) 6 mL 3     No current facility-administered medications for this visit.        ALLERGIES:    Allergies   Allergen Reactions   • Codeine Nausea Only       Objective      @VITALS    Current Status 11/14/2018   ECOG score 3       General Appearance: Patient is awake, alert, oriented and in no acute distress. Patient is welldeveloped, wellnourished, and appears stated age.  HEENT: Normocephalic. " Sclerae clear, conjunctiva pink, extraocular movements intact, pupils, round, reactive to light and  accommodation. Mouth and throat are clear with moist oral mucosa.  NECK: Supple, no jugular venous distention, thyroid not enlarged.  LYMPH: No cervical, supraclavicular, axillary, or inguinal lymphadenopathy.  CHEST: Equal bilateral expansion, AP  diameter normal, resonant percussion note  LUNGS: Good air movement, no rales, rhonchi, rubs or wheezes with auscultation  CARDIO: Regular sinus rhythm, no murmurs, gallops or rubs.  ABDOMEN: Nondistended, soft, No tenderness, no guarding, no rebound, No hepatosplenomegaly. No abdominal masses. Bowel sounds positive. No hernia  GENITALIA: Not examined.  BREASTS: Not examined.  MUSKEL: No joint swelling, decreased motion, or inflammation  EXTREMS: No edema, clubbing, cyanosis, No varicose veins.  NEURO: Grossly nonfocal, Gait is coordinated and smooth, Cognition is preserved.  SKIN: No rashes, no ecchymoses, no petechia.  PSYCH: Oriented to time, place and person. Memory is preserved. Mood and affect appear normal      RECENT LABS:  Lab on 12/12/2018   Component Date Value Ref Range Status   • WBC 12/12/2018 9.92  4.80 - 10.80 10*3/mm3 Final   • RBC 12/12/2018 3.34* 4.20 - 5.40 10*6/mm3 Final   • Hemoglobin 12/12/2018 9.9* 12.0 - 16.0 g/dL Final   • Hematocrit 12/12/2018 30.2* 37.0 - 47.0 % Final   • MCV 12/12/2018 90.4  82.0 - 98.0 fL Final   • MCH 12/12/2018 29.6  28.0 - 32.0 pg Final   • MCHC 12/12/2018 32.8* 33.0 - 36.0 g/dL Final   • RDW 12/12/2018 17.3* 12.0 - 15.0 % Final   • RDW-SD 12/12/2018 57.3* 40.0 - 54.0 fl Final   • MPV 12/12/2018 10.6  6.0 - 12.0 fL Final   • Platelets 12/12/2018 238  130 - 400 10*3/mm3 Final   • Neutrophil % 12/12/2018 72.9  39.0 - 78.0 % Final   • Lymphocyte % 12/12/2018 18.1  15.0 - 45.0 % Final   • Monocyte % 12/12/2018 5.6  4.0 - 12.0 % Final   • Eosinophil % 12/12/2018 2.4  0.0 - 4.0 % Final   • Basophil % 12/12/2018 0.4  0.0 - 2.0 %  Final   • Immature Grans % 12/12/2018 0.6  0.0 - 5.0 % Final   • Neutrophils, Absolute 12/12/2018 7.22  1.87 - 8.40 10*3/mm3 Final   • Lymphocytes, Absolute 12/12/2018 1.80  0.72 - 4.86 10*3/mm3 Final   • Monocytes, Absolute 12/12/2018 0.56  0.19 - 1.30 10*3/mm3 Final   • Eosinophils, Absolute 12/12/2018 0.24  0.00 - 0.70 10*3/mm3 Final   • Basophils, Absolute 12/12/2018 0.04  0.00 - 0.20 10*3/mm3 Final   • Immature Grans, Absolute 12/12/2018 0.06* 0.00 - 0.03 10*3/mm3 Final   • nRBC 12/12/2018 0.0  0.0 - 0.0 /100 WBC Final       RADIOLOGY:  Dexa Bone Density Axial    Result Date: 11/14/2018  Narrative: EXAMINATION: DEXA BONE DENSITY AXIAL- 11/14/2018 11:13 AM CST  HISTORY: 58-year-old female being screened for osteoporosis.  Bone densitometry has been performed using a Hologic Discovery C Model bone densitometer. The routine evaluation includes the lumbar spine and left hip.  The calculated bone density of the femoral neck is 0.643 g/cm2 which corresponds to a T-score of  -1.9 and a Z-score of -0.6.  The T-score corresponds to the number of standard deviations above or below the standard of a 30 year old patient.  The Z-score refers to the number of standard deviations above or below the mean for a person of the same age as this patient.  Evaluation of the lumbar spine demonstrates an average bone mineral density measurement of 1.093 g/cm2 which corresponds to a T-score of 0.4 and a Z-score of 1.7  Comparison: None      Impression: Impression:  1. Osteopenia of the left femoral neck. Low bone mass. The bone density is between 1.0 and 2.5 standard deviations below the mean for a young adult woman. There is an increased risk of fracture in these patients. This report was finalized on 11/14/2018 11:14 by Dr. Zuhair Walters MD.           Assessment/Plan        Anemia secondary to chronic kidney disease, Stage III. Patient has not required VALERIE therapy this far.  2. Iron deficiency in the setting of chronic kidney  disease, has benefitted from Feraheme. Had Feraheme in January, hemoglobin has  now near normalized at 10.3.  Plan continue watchful waiting return to clinic in 3 months time and check another CBC and iron studies.  If the iron is still low I may give additional intravenous iron.  Her CMS guidelines I cannot give her Procrit unless hemoglobin is less than 10.    Clinically patient asymptomatic.  I will redraw her iron studies today.  Hemoglobin is a 10.  Return to clinic in 3 months time.            Marco A Melo MD    12/12/2018    1:40 PM             [facility]  HEMATOLOGY & ONCOLOGY        Subjective     VISIT DIAGNOSIS:   Encounter Diagnoses   Name Primary?   • Anemia in stage 3 chronic kidney disease (CMS/HCC)    • Type 2 diabetes mellitus with stage 3 chronic kidney disease, with long-term current use of insulin (CMS/HCC) Yes       REASON FOR VISIT:     No chief complaint on file.       HEMATOLOGY / ONCOLOGY HISTORY:Ms. Shrestha is a 55 year old female with past medical history of hypertension, chronic kidney disease, diabetes insulindependent  type 2   No history exists.     [No treatment plan]  Cancer Staging Information:  No matching staging information was found for the patient.      INTERVAL HISTORY  Patient ID: Maria C Shrestha is a 58 y.o. year old female   Anemia secondary to chronic kidney disease, Stage III. Patient has not required VALERIE therapy this far.  2. Iron deficiency in the setting of chronic kidney disease, has benefitted from Feraheme. Had Feraheme in January, hemoglobin has  now near normalized at 10.7        Review of Systems   Constitutional: Negative.    HENT: Negative.    Eyes: Negative.    Respiratory: Negative.    Cardiovascular: Negative.    Gastrointestinal: Negative.    Endocrine: Negative.    Genitourinary: Negative.    Musculoskeletal: Negative.    Skin: Negative.    Allergic/Immunologic: Negative.    Neurological: Negative.    Hematological: Negative.   "  Psychiatric/Behavioral: Negative.             Medications:    Current Outpatient Medications   Medication Sig Dispense Refill   • allopurinol (ZYLOPRIM) 100 MG tablet Take 100 mg by mouth 2 (Two) Times a Day.     • amLODIPine (NORVASC) 5 MG tablet      • aspirin 81 MG tablet Take 81 mg by mouth Daily. Stop 7/22/2018     • busPIRone (BUSPAR) 10 MG tablet buspirone 10 mg tablet   Take 2 tablets twice a day by oral route as needed for 90 days.     • carvedilol (COREG) 3.125 MG tablet Take 3.125 mg by mouth 2 (Two) Times a Day.     • cetirizine (zyrTEC) 10 MG tablet Take 10 mg by mouth As Needed.     • Cholecalciferol (VITAMIN D3) 43273 units tablet Vitamin D2 50,000 unit capsule   Take 1 capsule every week by oral route. Sunday     • cinacalcet (SENSIPAR) 30 MG tablet Take 1 tablet by mouth Daily. 30 tablet 11   • citalopram (CeleXA) 20 MG tablet Take 20 mg by mouth Daily.     • diazePAM (VALIUM) 2 MG tablet Take 2 mg by mouth As Needed for Anxiety.     • fluticasone (VERAMYST) 27.5 MCG/SPRAY nasal spray 2 sprays into each nostril Daily.     • furosemide (LASIX) 40 MG tablet Take 1 tablet by mouth Daily. 90 tablet 3   • Glucose Blood (BLOOD GLUCOSE TEST) strip Use 4 x daily, use any brand covered by insurance or same brand as before 360 each 3   • HUMULIN R 500 UNIT/ML CONCENTRATED injection USING A U100 SYRINGE DRAW UP TO THE DIRECTED UNIT ROXI AND INJECT FOUR TIMES A DAY (UP TO 30 UNIT ROXI FOUR TIMES A DAY) (Patient taking differently: USING A U100 SYRINGE DRAW UP TO THE DIRECTED UNIT ROXI AND INJECT FOUR TIMES A DAY (UP TO 30 UNIT ROXI FOUR TIMES A DAY)  6 units with meals) 100 mL 3   • ibuprofen (ADVIL,MOTRIN) 800 MG tablet Take 800 mg by mouth Every 8 (Eight) Hours As Needed for Mild Pain .     • insulin glargine (LANTUS) 100 UNIT/ML injection 200 units daily (Patient taking differently: Inject 180 Units under the skin Every Night. 200 units daily) 18 each 3   • Insulin Syringe 31G X 5/16\" 1 ML misc 4 x daily " 120 each 11   • lamoTRIgine (LaMICtal) 50 MG tablet dispersible disintegrating tablet Take 50 mg by mouth Every Night.     • Lancets (FREESTYLE) lancets FOUR TIMES A  each 11   • levothyroxine (SYNTHROID, LEVOTHROID) 50 MCG tablet Take 1 tablet by mouth Daily. 90 tablet 1   • lisinopril (PRINIVIL,ZESTRIL) 20 MG tablet Take 20 mg by mouth 2 (Two) Times a Day.     • Multiple Vitamins-Minerals (MULTIVITAMIN ADULT PO) Take 1 tablet by mouth Daily.     • norethindrone (AYGESTIN) 5 MG tablet Take 1 tablet by mouth Daily. 30 tablet 1   • potassium gluconate 595 (99 K) MG tablet tablet potassium gluconate 595 mg (99 mg) tablet   Take 2 tablets every day by oral route.     • rosuvastatin (CRESTOR) 10 MG tablet Take 10 mg by mouth Daily.     • sodium bicarbonate 650 MG tablet Take 650 mg by mouth 3 (Three) Times a Day.     • traMADol (ULTRAM) 50 MG tablet Take 1 tablet by mouth Every 6 (Six) Hours As Needed for Moderate Pain . 8 tablet 0   • TRULICITY 1.5 MG/0.5ML solution pen-injector INJECT 1.5 MG UNDER THE SKIN EVERY 7 DAYS (Patient taking differently: INJECT 1.5 MG UNDER THE SKIN EVERY 7 DAYS Sunday) 6 mL 3     No current facility-administered medications for this visit.        ALLERGIES:    Allergies   Allergen Reactions   • Codeine Nausea Only       Objective      @VITALS    Current Status 11/14/2018   ECOG score 3       General Appearance: Patient is awake, alert, oriented and in no acute distress. Patient is welldeveloped, wellnourished, and appears stated age.  HEENT: Normocephalic. Sclerae clear, conjunctiva pink, extraocular movements intact, pupils, round, reactive to light and  accommodation. Mouth and throat are clear with moist oral mucosa.  NECK: Supple, no jugular venous distention, thyroid not enlarged.  LYMPH: No cervical, supraclavicular, axillary, or inguinal lymphadenopathy.  CHEST: Equal bilateral expansion, AP  diameter normal, resonant percussion note  LUNGS: Good air movement, no rales,  rhonchi, rubs or wheezes with auscultation  CARDIO: Regular sinus rhythm, no murmurs, gallops or rubs.  ABDOMEN: Nondistended, soft, No tenderness, no guarding, no rebound, No hepatosplenomegaly. No abdominal masses. Bowel sounds positive. No hernia  GENITALIA: Not examined.  BREASTS: Not examined.  MUSKEL: No joint swelling, decreased motion, or inflammation  EXTREMS: No edema, clubbing, cyanosis, No varicose veins.  NEURO: Grossly nonfocal, Gait is coordinated and smooth, Cognition is preserved.  SKIN: No rashes, no ecchymoses, no petechia.  PSYCH: Oriented to time, place and person. Memory is preserved. Mood and affect appear normal      RECENT LABS:  Lab on 12/12/2018   Component Date Value Ref Range Status   • WBC 12/12/2018 9.92  4.80 - 10.80 10*3/mm3 Final   • RBC 12/12/2018 3.34* 4.20 - 5.40 10*6/mm3 Final   • Hemoglobin 12/12/2018 9.9* 12.0 - 16.0 g/dL Final   • Hematocrit 12/12/2018 30.2* 37.0 - 47.0 % Final   • MCV 12/12/2018 90.4  82.0 - 98.0 fL Final   • MCH 12/12/2018 29.6  28.0 - 32.0 pg Final   • MCHC 12/12/2018 32.8* 33.0 - 36.0 g/dL Final   • RDW 12/12/2018 17.3* 12.0 - 15.0 % Final   • RDW-SD 12/12/2018 57.3* 40.0 - 54.0 fl Final   • MPV 12/12/2018 10.6  6.0 - 12.0 fL Final   • Platelets 12/12/2018 238  130 - 400 10*3/mm3 Final   • Neutrophil % 12/12/2018 72.9  39.0 - 78.0 % Final   • Lymphocyte % 12/12/2018 18.1  15.0 - 45.0 % Final   • Monocyte % 12/12/2018 5.6  4.0 - 12.0 % Final   • Eosinophil % 12/12/2018 2.4  0.0 - 4.0 % Final   • Basophil % 12/12/2018 0.4  0.0 - 2.0 % Final   • Immature Grans % 12/12/2018 0.6  0.0 - 5.0 % Final   • Neutrophils, Absolute 12/12/2018 7.22  1.87 - 8.40 10*3/mm3 Final   • Lymphocytes, Absolute 12/12/2018 1.80  0.72 - 4.86 10*3/mm3 Final   • Monocytes, Absolute 12/12/2018 0.56  0.19 - 1.30 10*3/mm3 Final   • Eosinophils, Absolute 12/12/2018 0.24  0.00 - 0.70 10*3/mm3 Final   • Basophils, Absolute 12/12/2018 0.04  0.00 - 0.20 10*3/mm3 Final   • Immature Grans,  Absolute 12/12/2018 0.06* 0.00 - 0.03 10*3/mm3 Final   • nRBC 12/12/2018 0.0  0.0 - 0.0 /100 WBC Final       RADIOLOGY:  Dexa Bone Density Axial    Result Date: 11/14/2018  Narrative: EXAMINATION: DEXA BONE DENSITY AXIAL- 11/14/2018 11:13 AM CST  HISTORY: 58-year-old female being screened for osteoporosis.  Bone densitometry has been performed using a Hologic Discovery C Model bone densitometer. The routine evaluation includes the lumbar spine and left hip.  The calculated bone density of the femoral neck is 0.643 g/cm2 which corresponds to a T-score of  -1.9 and a Z-score of -0.6.  The T-score corresponds to the number of standard deviations above or below the standard of a 30 year old patient.  The Z-score refers to the number of standard deviations above or below the mean for a person of the same age as this patient.  Evaluation of the lumbar spine demonstrates an average bone mineral density measurement of 1.093 g/cm2 which corresponds to a T-score of 0.4 and a Z-score of 1.7  Comparison: None      Impression: Impression:  1. Osteopenia of the left femoral neck. Low bone mass. The bone density is between 1.0 and 2.5 standard deviations below the mean for a young adult woman. There is an increased risk of fracture in these patients. This report was finalized on 11/14/2018 11:14 by Dr. Zuhair Walters MD.           Assessment/Plan        Anemia secondary to chronic kidney disease, Stage III. .  2. Iron deficiency in the setting of chronic kidney disease, has benefitted from Feraheme. Had Feraheme in January, hemoglobin has  now near normalized at 10.1.  Plan continue watchful waiting return to clinic in 3 months time and check another CBC and iron studies.  If the iron is still low I may give additional intravenous iron.  .    Clinically patient asymptomatic.  I will redraw her iron studies today.  Hemoglobin is a 9.7gm Today. Per CMS guidelines Rx Procrit 40,000 u s/q every month.    She has been bleeding per  vaginum and is planned D & C. Heavy set Obese women are more prone to Uterine carcinomas, therefore, will check , 9 especially when she tells me her grand mom had Uterine CA ).    1 wk ago she had HSBO, no malignency.   Hgb giselle. 9.9. Rx Procrit today.       Marco A Melo MD    12/12/2018    1:40 PM

## 2019-01-09 DIAGNOSIS — D63.1 ANEMIA IN STAGE 3 CHRONIC KIDNEY DISEASE (HCC): ICD-10-CM

## 2019-01-09 DIAGNOSIS — N18.30 ANEMIA IN STAGE 3 CHRONIC KIDNEY DISEASE (HCC): ICD-10-CM

## 2019-01-16 ENCOUNTER — APPOINTMENT (OUTPATIENT)
Dept: LAB | Facility: HOSPITAL | Age: 59
End: 2019-01-16

## 2019-01-16 ENCOUNTER — APPOINTMENT (OUTPATIENT)
Dept: ONCOLOGY | Facility: HOSPITAL | Age: 59
End: 2019-01-16
Attending: INTERNAL MEDICINE

## 2019-01-16 DIAGNOSIS — D63.1 ANEMIA IN STAGE 3 CHRONIC KIDNEY DISEASE (HCC): Primary | ICD-10-CM

## 2019-01-16 DIAGNOSIS — N18.30 ANEMIA IN STAGE 3 CHRONIC KIDNEY DISEASE (HCC): Primary | ICD-10-CM

## 2019-01-17 ENCOUNTER — INFUSION (OUTPATIENT)
Dept: ONCOLOGY | Facility: HOSPITAL | Age: 59
End: 2019-01-17
Attending: INTERNAL MEDICINE

## 2019-01-17 ENCOUNTER — LAB (OUTPATIENT)
Dept: LAB | Facility: HOSPITAL | Age: 59
End: 2019-01-17
Attending: INTERNAL MEDICINE

## 2019-01-17 ENCOUNTER — OFFICE VISIT (OUTPATIENT)
Dept: ONCOLOGY | Facility: CLINIC | Age: 59
End: 2019-01-17

## 2019-01-17 VITALS
HEIGHT: 65 IN | TEMPERATURE: 98.4 F | RESPIRATION RATE: 16 BRPM | WEIGHT: 293 LBS | SYSTOLIC BLOOD PRESSURE: 144 MMHG | OXYGEN SATURATION: 97 % | BODY MASS INDEX: 48.82 KG/M2 | HEART RATE: 68 BPM | DIASTOLIC BLOOD PRESSURE: 68 MMHG

## 2019-01-17 VITALS
HEIGHT: 65 IN | TEMPERATURE: 97.5 F | RESPIRATION RATE: 18 BRPM | DIASTOLIC BLOOD PRESSURE: 78 MMHG | SYSTOLIC BLOOD PRESSURE: 168 MMHG | OXYGEN SATURATION: 99 % | WEIGHT: 293 LBS | BODY MASS INDEX: 48.82 KG/M2 | HEART RATE: 67 BPM

## 2019-01-17 DIAGNOSIS — D63.1 ANEMIA IN STAGE 3 CHRONIC KIDNEY DISEASE (HCC): Primary | ICD-10-CM

## 2019-01-17 DIAGNOSIS — N18.30 ANEMIA IN STAGE 3 CHRONIC KIDNEY DISEASE (HCC): ICD-10-CM

## 2019-01-17 DIAGNOSIS — D63.1 ANEMIA IN STAGE 3 CHRONIC KIDNEY DISEASE (HCC): ICD-10-CM

## 2019-01-17 DIAGNOSIS — N18.4 ANEMIA IN STAGE 4 CHRONIC KIDNEY DISEASE (HCC): Primary | ICD-10-CM

## 2019-01-17 DIAGNOSIS — D63.1 ANEMIA IN STAGE 4 CHRONIC KIDNEY DISEASE (HCC): Primary | ICD-10-CM

## 2019-01-17 DIAGNOSIS — N18.4 CHRONIC KIDNEY DISEASE, STAGE 4 (SEVERE) (HCC): ICD-10-CM

## 2019-01-17 DIAGNOSIS — N18.30 ANEMIA IN STAGE 3 CHRONIC KIDNEY DISEASE (HCC): Primary | ICD-10-CM

## 2019-01-17 LAB
ALBUMIN SERPL-MCNC: 4 G/DL (ref 3.5–5)
ALBUMIN/GLOB SERPL: 1.2 G/DL (ref 1.1–2.5)
ALP SERPL-CCNC: 84 U/L (ref 24–120)
ALT SERPL W P-5'-P-CCNC: <15 U/L (ref 0–54)
ANION GAP SERPL CALCULATED.3IONS-SCNC: 11 MMOL/L (ref 4–13)
AST SERPL-CCNC: 22 U/L (ref 7–45)
BASOPHILS # BLD AUTO: 0.04 10*3/MM3 (ref 0–0.2)
BASOPHILS NFR BLD AUTO: 0.4 % (ref 0–2)
BILIRUB SERPL-MCNC: 0.4 MG/DL (ref 0.1–1)
BUN BLD-MCNC: 33 MG/DL (ref 5–21)
BUN/CREAT SERPL: 15.6 (ref 7–25)
CALCIUM SPEC-SCNC: 8.8 MG/DL (ref 8.4–10.4)
CHLORIDE SERPL-SCNC: 102 MMOL/L (ref 98–110)
CO2 SERPL-SCNC: 26 MMOL/L (ref 24–31)
CREAT BLD-MCNC: 2.11 MG/DL (ref 0.5–1.4)
DEPRECATED RDW RBC AUTO: 56.9 FL (ref 40–54)
EOSINOPHIL # BLD AUTO: 0.21 10*3/MM3 (ref 0–0.7)
EOSINOPHIL NFR BLD AUTO: 2 % (ref 0–4)
ERYTHROCYTE [DISTWIDTH] IN BLOOD BY AUTOMATED COUNT: 17 % (ref 12–15)
GFR SERPL CREATININE-BSD FRML MDRD: 24 ML/MIN/1.73
GLOBULIN UR ELPH-MCNC: 3.3 GM/DL
GLUCOSE BLD-MCNC: 137 MG/DL (ref 70–100)
HCT VFR BLD AUTO: 31.1 % (ref 37–47)
HGB BLD-MCNC: 10 G/DL (ref 12–16)
HOLD SPECIMEN: NORMAL
HOLD SPECIMEN: NORMAL
IMM GRANULOCYTES # BLD AUTO: 0.04 10*3/MM3 (ref 0–0.03)
IMM GRANULOCYTES NFR BLD AUTO: 0.4 % (ref 0–5)
LYMPHOCYTES # BLD AUTO: 1.94 10*3/MM3 (ref 0.72–4.86)
LYMPHOCYTES NFR BLD AUTO: 18.9 % (ref 15–45)
MCH RBC QN AUTO: 29.2 PG (ref 28–32)
MCHC RBC AUTO-ENTMCNC: 32.2 G/DL (ref 33–36)
MCV RBC AUTO: 90.9 FL (ref 82–98)
MONOCYTES # BLD AUTO: 0.56 10*3/MM3 (ref 0.19–1.3)
MONOCYTES NFR BLD AUTO: 5.4 % (ref 4–12)
NEUTROPHILS # BLD AUTO: 7.49 10*3/MM3 (ref 1.87–8.4)
NEUTROPHILS NFR BLD AUTO: 72.9 % (ref 39–78)
NRBC BLD AUTO-RTO: 0 /100 WBC (ref 0–0)
PLATELET # BLD AUTO: 225 10*3/MM3 (ref 130–400)
PMV BLD AUTO: 10 FL (ref 6–12)
POTASSIUM BLD-SCNC: 4.5 MMOL/L (ref 3.5–5.3)
PROT SERPL-MCNC: 7.3 G/DL (ref 6.3–8.7)
RBC # BLD AUTO: 3.42 10*6/MM3 (ref 4.2–5.4)
SODIUM BLD-SCNC: 139 MMOL/L (ref 135–145)
WBC NRBC COR # BLD: 10.28 10*3/MM3 (ref 4.8–10.8)

## 2019-01-17 PROCEDURE — 36415 COLL VENOUS BLD VENIPUNCTURE: CPT

## 2019-01-17 PROCEDURE — 25010000002 EPOETIN ALFA PER 1000 UNITS: Performed by: INTERNAL MEDICINE

## 2019-01-17 PROCEDURE — 85025 COMPLETE CBC W/AUTO DIFF WBC: CPT

## 2019-01-17 PROCEDURE — 99213 OFFICE O/P EST LOW 20 MIN: CPT | Performed by: INTERNAL MEDICINE

## 2019-01-17 PROCEDURE — 80053 COMPREHEN METABOLIC PANEL: CPT

## 2019-01-17 PROCEDURE — 96372 THER/PROPH/DIAG INJ SC/IM: CPT

## 2019-01-17 RX ADMIN — ERYTHROPOIETIN 40000 UNITS: 40000 INJECTION, SOLUTION INTRAVENOUS; SUBCUTANEOUS at 15:20

## 2019-01-17 NOTE — PROGRESS NOTES
"      PROBLEM LIST:  1. Anemia of chronic kidney disease, improved on VALERIE (Procrit)  2. Chronic kidney disease, stage 4            HISTORY OF PRESENT ILLNESS:   Chief Complaint: Anemia  No new sx, still fatigues very easily but less pedal edema and has reduced diuretic dose. No side effects from Procrit. No orthostasis or other sx of worsening edema.      Past Medical History, Past Surgical History, Social History, Family History have been reviewed and are without significant changes except as mentioned.    Review of Systems   A comprehensive 14 point review of systems was performed and was negative except as mentioned.    Medications:  The current medication list was reviewed in the EMR    ALLERGIES:  Allergies not on file           /68   Pulse 68   Temp 98.4 °F (36.9 °C) (Oral)   Resp 16   Ht 165.1 cm (65\")   Wt (!) 179 kg (395 lb 3.2 oz)   LMP  (LMP Unknown)   SpO2 97%   Breastfeeding? No   BMI 65.76 kg/m²       General: comfortable- appearing, in no acute distress, in wheel chair   HEENT: sclera anicteric, oropharynx clear  Lymphatics: no cervical, supraclavicular, or axillary adenopathy  Cardiovascular: regular rate and rhythm, no murmurs  Lungs: clear to auscultation bilaterally  Abdomen: soft, nontender, nondistended.  No palpable organomegaly  Extremeties: moderate symmetric lower extremity edema  Skin: no rashes, lesions, bruising, or petechiae    RECENT LABS:   WBC   Date Value Ref Range Status   01/17/2019 10.28 4.80 - 10.80 10*3/mm3 Final     Hemoglobin   Date Value Ref Range Status   01/17/2019 10.0 (L) 12.0 - 16.0 g/dL Final     Hematocrit   Date Value Ref Range Status   01/17/2019 31.1 (L) 37.0 - 47.0 % Final     MCV   Date Value Ref Range Status   01/17/2019 90.9 82.0 - 98.0 fL Final     RDW   Date Value Ref Range Status   01/17/2019 17.0 (H) 12.0 - 15.0 % Final     MPV   Date Value Ref Range Status   01/17/2019 10.0 6.0 - 12.0 fL Final     Platelets   Date Value Ref Range Status "   01/17/2019 225 130 - 400 10*3/mm3 Final     Immature Grans %   Date Value Ref Range Status   01/17/2019 0.4 0.0 - 5.0 % Final     Neutrophils, Absolute   Date Value Ref Range Status   01/17/2019 7.49 1.87 - 8.40 10*3/mm3 Final     Lymphocytes, Absolute   Date Value Ref Range Status   01/17/2019 1.94 0.72 - 4.86 10*3/mm3 Final     Monocytes, Absolute   Date Value Ref Range Status   01/17/2019 0.56 0.19 - 1.30 10*3/mm3 Final     Eosinophils, Absolute   Date Value Ref Range Status   01/17/2019 0.21 0.00 - 0.70 10*3/mm3 Final     Basophils, Absolute   Date Value Ref Range Status   01/17/2019 0.04 0.00 - 0.20 10*3/mm3 Final     Immature Grans, Absolute   Date Value Ref Range Status   01/17/2019 0.04 (H) 0.00 - 0.03 10*3/mm3 Final     nRBC   Date Value Ref Range Status   01/17/2019 0.0 0.0 - 0.0 /100 WBC Final       Glucose   Date Value Ref Range Status   01/17/2019 137 (H) 70 - 100 mg/dL Final     Sodium   Date Value Ref Range Status   01/17/2019 139 135 - 145 mmol/L Final     Potassium   Date Value Ref Range Status   01/17/2019 4.5 3.5 - 5.3 mmol/L Final     CO2   Date Value Ref Range Status   01/17/2019 26.0 24.0 - 31.0 mmol/L Final     Chloride   Date Value Ref Range Status   01/17/2019 102 98 - 110 mmol/L Final     Anion Gap   Date Value Ref Range Status   01/17/2019 11.0 4.0 - 13.0 mmol/L Final     Creatinine   Date Value Ref Range Status   01/17/2019 2.11 (H) 0.50 - 1.40 mg/dL Final     BUN   Date Value Ref Range Status   01/17/2019 33 (H) 5 - 21 mg/dL Final     BUN/Creatinine Ratio   Date Value Ref Range Status   01/17/2019 15.6 7.0 - 25.0 Final     Calcium   Date Value Ref Range Status   01/17/2019 8.8 8.4 - 10.4 mg/dL Final     eGFR Non  Amer   Date Value Ref Range Status   01/17/2019 24 (L) >60 mL/min/1.73 Final     Alkaline Phosphatase   Date Value Ref Range Status   01/17/2019 84 24 - 120 U/L Final     Total Protein   Date Value Ref Range Status   01/17/2019 7.3 6.3 - 8.7 g/dL Final     ALT (SGPT)    Date Value Ref Range Status   01/17/2019 <15 0 - 54 U/L Final     AST (SGOT)   Date Value Ref Range Status   01/17/2019 22 7 - 45 U/L Final     Total Bilirubin   Date Value Ref Range Status   01/17/2019 0.4 0.1 - 1.0 mg/dL Final     Albumin   Date Value Ref Range Status   01/17/2019 4.00 3.50 - 5.00 g/dL Final     Globulin   Date Value Ref Range Status   01/17/2019 3.3 gm/dL Final       No results found for: LDH, URICACID    No results found for: MSPIKE, KAPPALAMB, IGLFLC, FREEKAPPAL              Impression: 1. Anemia in CKD, sx improved on Procrit.    Plan: I will continue Procrit monthly at the same dose with target Hb approximately 10. Dose/interval can be adjusted based on her response. She will return in 1 month but can probably be seen less often if labs etc remain stable.                      Ace Brower MD  [facility]    01/17/19           CC:

## 2019-01-24 ENCOUNTER — LAB (OUTPATIENT)
Dept: LAB | Facility: HOSPITAL | Age: 59
End: 2019-01-24

## 2019-01-24 DIAGNOSIS — N18.30 ANEMIA IN STAGE 3 CHRONIC KIDNEY DISEASE (HCC): ICD-10-CM

## 2019-01-24 DIAGNOSIS — D63.1 ANEMIA IN STAGE 3 CHRONIC KIDNEY DISEASE (HCC): ICD-10-CM

## 2019-01-24 LAB
25(OH)D3 SERPL-MCNC: 24.7 NG/ML (ref 30–100)
ALBUMIN SERPL-MCNC: 3.7 G/DL (ref 3.5–5)
ALBUMIN/GLOB SERPL: 1 G/DL (ref 1.1–2.5)
ALP SERPL-CCNC: 83 U/L (ref 24–120)
ALT SERPL W P-5'-P-CCNC: 20 U/L (ref 0–54)
ANION GAP SERPL CALCULATED.3IONS-SCNC: 10 MMOL/L (ref 4–13)
AST SERPL-CCNC: 19 U/L (ref 7–45)
AUTO MIXED CELLS #: 0.6 10*3/MM3 (ref 0.1–2.6)
AUTO MIXED CELLS %: 6.7 % (ref 0.1–24)
BILIRUB SERPL-MCNC: 0.5 MG/DL (ref 0.1–1)
BUN BLD-MCNC: 48 MG/DL (ref 5–21)
BUN/CREAT SERPL: 20
CALCIUM SPEC-SCNC: 8.5 MG/DL (ref 8.4–10.4)
CHLORIDE SERPL-SCNC: 103 MMOL/L (ref 98–110)
CHOLEST SERPL-MCNC: 169 MG/DL (ref 130–200)
CO2 SERPL-SCNC: 26 MMOL/L (ref 24–31)
CREAT BLD-MCNC: 2.4 MG/DL (ref 0.5–1.4)
ERYTHROCYTE [DISTWIDTH] IN BLOOD BY AUTOMATED COUNT: 17.6 % (ref 12–15)
GFR SERPL CREATININE-BSD FRML MDRD: 21 ML/MIN/1.73
GLOBULIN UR ELPH-MCNC: 3.6 GM/DL
GLUCOSE BLD-MCNC: 127 MG/DL (ref 70–100)
HBA1C MFR BLD: 7 %
HCT VFR BLD AUTO: 32.5 % (ref 37–47)
HDLC SERPL-MCNC: 34 MG/DL
HGB BLD-MCNC: 10.7 G/DL (ref 12–16)
LDLC SERPL CALC-MCNC: 83 MG/DL (ref 0–99)
LDLC/HDLC SERPL: 2.45 {RATIO}
LYMPHOCYTES # BLD AUTO: 1.2 10*3/MM3 (ref 0.8–7)
LYMPHOCYTES NFR BLD AUTO: 13.4 % (ref 15–45)
MCH RBC QN AUTO: 29.9 PG (ref 28–32)
MCHC RBC AUTO-ENTMCNC: 32.9 G/DL (ref 33–36)
MCV RBC AUTO: 90.8 FL (ref 82–98)
NEUTROPHILS # BLD AUTO: 7.1 10*3/MM3 (ref 1.5–8.3)
NEUTROPHILS NFR BLD AUTO: 79.9 % (ref 39–78)
PLATELET # BLD AUTO: 229 10*3/MM3 (ref 130–400)
PMV BLD AUTO: 9 FL (ref 6–12)
POTASSIUM BLD-SCNC: 4.3 MMOL/L (ref 3.5–5.3)
PROT SERPL-MCNC: 7.3 G/DL (ref 6.3–8.7)
RBC # BLD AUTO: 3.58 10*6/MM3 (ref 4.2–5.4)
SODIUM BLD-SCNC: 139 MMOL/L (ref 135–145)
TRIGL SERPL-MCNC: 258 MG/DL (ref 0–149)
TSH SERPL DL<=0.05 MIU/L-ACNC: 4.4 MIU/ML (ref 0.47–4.68)
VIT B12 BLD-MCNC: 896 PG/ML (ref 239–931)
VLDLC SERPL-MCNC: 51.6 MG/DL
WBC NRBC COR # BLD: 8.9 10*3/MM3 (ref 4.8–10.8)

## 2019-01-24 PROCEDURE — 80061 LIPID PANEL: CPT | Performed by: INTERNAL MEDICINE

## 2019-01-24 PROCEDURE — 85025 COMPLETE CBC W/AUTO DIFF WBC: CPT | Performed by: INTERNAL MEDICINE

## 2019-01-24 PROCEDURE — 80053 COMPREHEN METABOLIC PANEL: CPT | Performed by: INTERNAL MEDICINE

## 2019-01-24 PROCEDURE — 82043 UR ALBUMIN QUANTITATIVE: CPT | Performed by: INTERNAL MEDICINE

## 2019-01-24 PROCEDURE — 83036 HEMOGLOBIN GLYCOSYLATED A1C: CPT | Performed by: INTERNAL MEDICINE

## 2019-01-24 PROCEDURE — 82607 VITAMIN B-12: CPT | Performed by: INTERNAL MEDICINE

## 2019-01-24 PROCEDURE — 36415 COLL VENOUS BLD VENIPUNCTURE: CPT | Performed by: INTERNAL MEDICINE

## 2019-01-24 PROCEDURE — 82570 ASSAY OF URINE CREATININE: CPT | Performed by: INTERNAL MEDICINE

## 2019-01-24 PROCEDURE — 84443 ASSAY THYROID STIM HORMONE: CPT | Performed by: INTERNAL MEDICINE

## 2019-01-24 PROCEDURE — 83970 ASSAY OF PARATHORMONE: CPT | Performed by: INTERNAL MEDICINE

## 2019-01-24 PROCEDURE — 82306 VITAMIN D 25 HYDROXY: CPT | Performed by: INTERNAL MEDICINE

## 2019-01-25 LAB
CALCIUM SERPL-MCNC: 8.7 MG/DL (ref 8.7–10.2)
CREAT 24H UR-MCNC: 81.8 MG/DL
INTACT PTH: NORMAL
MICROALBUMIN UR-MCNC: 1174.7 UG/ML
MICROALBUMIN/CREAT UR: 1436.1 MG/G CREAT (ref 0–30)
PTH-INTACT SERPL-MCNC: 61 PG/ML (ref 15–65)

## 2019-01-30 ENCOUNTER — OFFICE VISIT (OUTPATIENT)
Dept: ENDOCRINOLOGY | Facility: CLINIC | Age: 59
End: 2019-01-30

## 2019-01-30 VITALS
BODY MASS INDEX: 48.82 KG/M2 | OXYGEN SATURATION: 95 % | SYSTOLIC BLOOD PRESSURE: 142 MMHG | WEIGHT: 293 LBS | HEIGHT: 65 IN | DIASTOLIC BLOOD PRESSURE: 70 MMHG | HEART RATE: 90 BPM

## 2019-01-30 DIAGNOSIS — I15.2 HYPERTENSION ASSOCIATED WITH DIABETES (HCC): ICD-10-CM

## 2019-01-30 DIAGNOSIS — E78.2 MIXED DIABETIC HYPERLIPIDEMIA ASSOCIATED WITH TYPE 2 DIABETES MELLITUS (HCC): ICD-10-CM

## 2019-01-30 DIAGNOSIS — E83.52 HYPERCALCEMIA: ICD-10-CM

## 2019-01-30 DIAGNOSIS — Z79.4 TYPE 2 DIABETES MELLITUS WITH STAGE 3 CHRONIC KIDNEY DISEASE, WITH LONG-TERM CURRENT USE OF INSULIN (HCC): Primary | ICD-10-CM

## 2019-01-30 DIAGNOSIS — N18.30 TYPE 2 DIABETES MELLITUS WITH STAGE 3 CHRONIC KIDNEY DISEASE, WITH LONG-TERM CURRENT USE OF INSULIN (HCC): Primary | ICD-10-CM

## 2019-01-30 DIAGNOSIS — E11.69 MIXED DIABETIC HYPERLIPIDEMIA ASSOCIATED WITH TYPE 2 DIABETES MELLITUS (HCC): ICD-10-CM

## 2019-01-30 DIAGNOSIS — E11.22 TYPE 2 DIABETES MELLITUS WITH STAGE 3 CHRONIC KIDNEY DISEASE, WITH LONG-TERM CURRENT USE OF INSULIN (HCC): Primary | ICD-10-CM

## 2019-01-30 DIAGNOSIS — E11.59 HYPERTENSION ASSOCIATED WITH DIABETES (HCC): ICD-10-CM

## 2019-01-30 DIAGNOSIS — M85.852 OSTEOPENIA OF LEFT HIP: ICD-10-CM

## 2019-01-30 DIAGNOSIS — E55.9 VITAMIN D DEFICIENCY: ICD-10-CM

## 2019-01-30 DIAGNOSIS — R01.1 HEART MURMUR: ICD-10-CM

## 2019-01-30 DIAGNOSIS — E53.8 B12 DEFICIENCY: ICD-10-CM

## 2019-01-30 DIAGNOSIS — E03.9 ACQUIRED HYPOTHYROIDISM: ICD-10-CM

## 2019-01-30 PROCEDURE — 95249 CONT GLUC MNTR PT PROV EQP: CPT | Performed by: INTERNAL MEDICINE

## 2019-01-30 PROCEDURE — 99214 OFFICE O/P EST MOD 30 MIN: CPT | Performed by: INTERNAL MEDICINE

## 2019-01-30 NOTE — PROGRESS NOTES
Maria C Shrestha is a 58 y.o. female who presents for  evaluation of   Chief Complaint   Patient presents with   • Diabetes         Primary Care / Referring Provider  Ole Soliman MD    History of Present Illness  Duration/Timing:  Diabetes mellitus type 2  timing constant    quality controlled     severity high     Severity (Complications/Hospitalizations)  Secondary Microvascular Complications:  Diabetic Nephropathy, No Diabetic Neuropathy      Context  Diabetes Regimen:  Insulin, Compliant with regimen  Blood Glucose Readings  Mostly at goal but frequent hypoglycemia since starting keto diet     Diet    counts carbs, eating only 45 g cho per meal     Exercise:  Does not exercise    Associated Signs/Symptoms  Hyperglycemic Symptoms:  No polyuria, No polydipsia, No polyphagia, Weight loss with keto diet   Hypoglycemic Episodes:  No documented hypoglycemia    Past Medical History:   Diagnosis Date   • Acquired hypothyroidism 2/6/2017   • Allergic    • Anemia    • Anxiety    • Arthritis    • Cellulitis    • Chronic kidney disease     3rd stage   • Depression    • Disease of thyroid gland    • Dyslipidemia    • Elevated cholesterol    • Essential hypertension    • Fatigue    • Gallbladder abscess    • Hearing loss    • Light headed    • Limited range of motion (ROM) of shoulder     right   • Obesity    • Palpitation    • Short of breath on exertion    • Sleep apnea    • Type 2 diabetes mellitus (CMS/HCC)    • Type II diabetes mellitus, uncontrolled (CMS/HCC)    • Wears glasses      Family History   Problem Relation Age of Onset   • Diabetes Other    • Cancer Mother    • Cancer Father    • Heart disease Father    • Diabetes Father    • Obesity Father    • Stroke Father    • Cancer Maternal Grandmother    • Uterine cancer Maternal Grandmother    • Diabetes Maternal Grandfather    • Cancer Paternal Grandmother    • Colon cancer Paternal Grandmother    • Diabetes Paternal Grandfather    • Heart disease Paternal  Grandfather    • No Known Problems Sister    • No Known Problems Brother    • No Known Problems Daughter    • No Known Problems Son    • No Known Problems Maternal Aunt    • No Known Problems Paternal Aunt    • BRCA 1/2 Neg Hx    • Breast cancer Neg Hx    • Endometrial cancer Neg Hx    • Ovarian cancer Neg Hx      Social History     Tobacco Use   • Smoking status: Never Smoker   • Smokeless tobacco: Never Used   Substance Use Topics   • Alcohol use: No   • Drug use: No         Current Outpatient Medications:   •  allopurinol (ZYLOPRIM) 100 MG tablet, Take 100 mg by mouth 2 (Two) Times a Day., Disp: , Rfl:   •  amLODIPine (NORVASC) 5 MG tablet, , Disp: , Rfl:   •  aspirin 81 MG tablet, Take 81 mg by mouth Daily. Stop 7/22/2018, Disp: , Rfl:   •  busPIRone (BUSPAR) 10 MG tablet, buspirone 10 mg tablet  Take 2 tablets twice a day by oral route as needed for 90 days., Disp: , Rfl:   •  carvedilol (COREG) 3.125 MG tablet, Take 3.125 mg by mouth 2 (Two) Times a Day., Disp: , Rfl:   •  cetirizine (zyrTEC) 10 MG tablet, Take 10 mg by mouth As Needed., Disp: , Rfl:   •  Cholecalciferol (VITAMIN D3) 53067 units tablet, Vitamin D2 50,000 unit capsule  Take 1 capsule every week by oral route. Sunday, Disp: , Rfl:   •  cinacalcet (SENSIPAR) 30 MG tablet, Take 1 tablet by mouth Daily., Disp: 30 tablet, Rfl: 11  •  citalopram (CeleXA) 20 MG tablet, Take 20 mg by mouth Daily., Disp: , Rfl:   •  diazePAM (VALIUM) 2 MG tablet, Take 2 mg by mouth As Needed for Anxiety., Disp: , Rfl:   •  fluticasone (VERAMYST) 27.5 MCG/SPRAY nasal spray, 2 sprays into each nostril Daily., Disp: , Rfl:   •  Glucose Blood (BLOOD GLUCOSE TEST) strip, Use 4 x daily, use any brand covered by insurance or same brand as before, Disp: 360 each, Rfl: 3  •  HUMULIN R 500 UNIT/ML CONCENTRATED injection, USING A U100 SYRINGE DRAW UP TO THE DIRECTED UNIT ROXI AND INJECT FOUR TIMES A DAY (UP TO 30 UNIT ROXI FOUR TIMES A DAY) (Patient taking differently: USING A  "U100 SYRINGE DRAW UP TO THE DIRECTED UNIT ROXI AND INJECT FOUR TIMES A DAY (UP TO 30 UNIT ROXI FOUR TIMES A DAY)  6 units with meals), Disp: 100 mL, Rfl: 3  •  ibuprofen (ADVIL,MOTRIN) 800 MG tablet, Take 800 mg by mouth Every 8 (Eight) Hours As Needed for Mild Pain ., Disp: , Rfl:   •  insulin glargine (LANTUS) 100 UNIT/ML injection, 200 units daily (Patient taking differently: Inject 180 Units under the skin Every Night. 200 units daily), Disp: 18 each, Rfl: 3  •  Insulin Syringe 31G X 5/16\" 1 ML misc, 4 x daily, Disp: 120 each, Rfl: 11  •  lamoTRIgine (LaMICtal) 50 MG tablet dispersible disintegrating tablet, Take 50 mg by mouth Every Night., Disp: , Rfl:   •  Lancets (FREESTYLE) lancets, FOUR TIMES A DAY, Disp: 150 each, Rfl: 11  •  levothyroxine (SYNTHROID, LEVOTHROID) 50 MCG tablet, Take 1 tablet by mouth Daily., Disp: 90 tablet, Rfl: 1  •  lisinopril (PRINIVIL,ZESTRIL) 20 MG tablet, Take 20 mg by mouth 2 (Two) Times a Day., Disp: , Rfl:   •  Multiple Vitamins-Minerals (MULTIVITAMIN ADULT PO), Take 1 tablet by mouth Daily., Disp: , Rfl:   •  norethindrone (AYGESTIN) 5 MG tablet, Take 1 tablet by mouth Daily., Disp: 30 tablet, Rfl: 1  •  potassium gluconate 595 (99 K) MG tablet tablet, potassium gluconate 595 mg (99 mg) tablet  Take 2 tablets every day by oral route., Disp: , Rfl:   •  rosuvastatin (CRESTOR) 10 MG tablet, Take 10 mg by mouth Daily., Disp: , Rfl:   •  sodium bicarbonate 650 MG tablet, Take 650 mg by mouth 3 (Three) Times a Day., Disp: , Rfl:   •  traMADol (ULTRAM) 50 MG tablet, Take 1 tablet by mouth Every 6 (Six) Hours As Needed for Moderate Pain ., Disp: 8 tablet, Rfl: 0  •  TRULICITY 1.5 MG/0.5ML solution pen-injector, INJECT 1.5 MG UNDER THE SKIN EVERY 7 DAYS (Patient taking differently: INJECT 1.5 MG UNDER THE SKIN EVERY 7 DAYS Sunday), Disp: 6 mL, Rfl: 3    Review of Systems    Review of Systems   Constitutional: Positive for unexpected weight change. Negative for activity change, appetite " "change, chills, diaphoresis, fatigue and fever.   HENT: Negative for congestion, drooling, ear discharge, ear pain, facial swelling, mouth sores, nosebleeds, postnasal drip, sinus pressure, sneezing, sore throat, tinnitus, trouble swallowing and voice change.    Eyes: Negative.  Negative for photophobia, pain, discharge, redness and itching.   Respiratory: Negative.  Negative for apnea, cough, choking, chest tightness, shortness of breath, wheezing and stridor.    Cardiovascular: Negative.  Negative for chest pain, palpitations and leg swelling.   Gastrointestinal: Negative.  Negative for abdominal distention, abdominal pain, constipation, diarrhea, nausea and vomiting.   Endocrine: Positive for polydipsia, polyphagia and polyuria. Negative for cold intolerance and heat intolerance.   Genitourinary: Negative for difficulty urinating, dysuria, flank pain and frequency.   Musculoskeletal: Positive for arthralgias, back pain and myalgias. Negative for gait problem, joint swelling, neck pain and neck stiffness.   Skin: Negative for color change, pallor, rash and wound.   Allergic/Immunologic: Negative for environmental allergies, food allergies and immunocompromised state.   Neurological: Negative for dizziness, tremors, seizures, syncope, facial asymmetry, speech difficulty, weakness, light-headedness, numbness and headaches.   Hematological: Negative for adenopathy. Does not bruise/bleed easily.   Psychiatric/Behavioral: Negative for agitation, behavioral problems, confusion, decreased concentration, dysphoric mood, hallucinations, self-injury, sleep disturbance and suicidal ideas. The patient is not nervous/anxious and is not hyperactive.         Objective:     /70   Pulse 90   Ht 165.1 cm (65\")   Wt (!) 171 kg (376 lb 6.4 oz)   LMP  (LMP Unknown)   SpO2 95%   BMI 62.64 kg/m²     Physical Exam   Constitutional: She is oriented to person, place, and time. She appears well-developed.   HENT:   Head: " Normocephalic.   Right Ear: External ear normal.   Left Ear: External ear normal.   Nose: Nose normal.   Eyes: Conjunctivae and EOM are normal. No scleral icterus.   Neck: Normal range of motion. Neck supple. No tracheal deviation present. No thyromegaly present.   Cardiovascular: Normal rate, regular rhythm and intact distal pulses. Exam reveals no gallop and no friction rub.   Murmur heard.  Systolic murmur, carotid bruit    Pulmonary/Chest: Effort normal and breath sounds normal. No stridor. No respiratory distress. She has no wheezes. She has no rales. She exhibits no tenderness.   Abdominal: Soft. Bowel sounds are normal. She exhibits no distension and no mass. There is no tenderness. There is no rebound and no guarding.   Musculoskeletal: Normal range of motion. She exhibits no tenderness or deformity.   Lymphadenopathy:     She has no cervical adenopathy.   Neurological: She is alert and oriented to person, place, and time. She displays normal reflexes. She exhibits normal muscle tone. Coordination normal.   Skin: No rash noted. No erythema. No pallor.   Psychiatric: She has a normal mood and affect. Her behavior is normal. Judgment and thought content normal.       Lab Review            Assessment/Plan       ICD-10-CM ICD-9-CM   1. Type 2 diabetes mellitus with stage 3 chronic kidney disease, with long-term current use of insulin (CMS/[facility]) E11.22 250.40    N18.3 585.3    Z79.4 V58.67   2. Mixed diabetic hyperlipidemia associated with type 2 diabetes mellitus (CMS/[facility]) E11.69 250.80    E78.2 272.2   3. Hypertension associated with diabetes (CMS/[facility]) E11.59 250.80    I10 401.9   4. Acquired hypothyroidism E03.9 244.9   5. B12 deficiency E53.8 266.2   6. Vitamin D deficiency E55.9 268.9   7. Osteoporosis, unspecified osteoporosis type, unspecified pathological fracture presence M81.0 733.00   8. Hypercalcemia E83.52 275.42   9. Heart murmur R01.1 785.2       Glycemic Management:   Lab Results   Component Value  Date    HGBA1C 7.0 01/24/2019    HGBA1C 7.0 10/25/2018    HGBA1C 7.6 07/05/2018     Lab Results   Component Value Date    GLUCOSE 127 (H) 01/24/2019    BUN 48 (H) 01/24/2019    CREATININE 2.40 (H) 01/24/2019    EGFRIFNONA 21 (L) 01/24/2019    BCR 20.0 01/24/2019    CO2 26.0 01/24/2019    CALCIUM 8.7 01/24/2019    CALCIUM 8.5 01/24/2019    ALBUMIN 3.70 01/24/2019    AST 19 01/24/2019    ALT 20 01/24/2019     Lab Results   Component Value Date    WBC 8.90 01/24/2019    HGB 10.7 (L) 01/24/2019    HCT 32.5 (L) 01/24/2019    MCV 90.8 01/24/2019     01/24/2019     Lab Results   Component Value Date    CREATININE 2.40 (H) 01/24/2019    CREATININE 2.11 (H) 01/17/2019    CREATININE 2.09 (H) 12/12/2018    CREATININE 1.95 (H) 11/14/2018    CREATININE 1.92 (H) 10/25/2018     Filippo from jan 17 to Jan 30 reviewed  avg 94,   Hypos   Mainly nocturnal       Trulicity 1.5 mg weekly       Humulin U 500 , was doing 20 to 40 w meals but now stopped    In addition doing lantus 180 units before now 100 and still having hypo -- decrease to 80     invokana stopped due to decline in renal function    Will give rx for u100 humalog up to 30 units with meals     Lipid Management    Lab Results   Component Value Date    TRIG 258 (H) 01/24/2019    TRIG 362 (H) 10/25/2018    TRIG 419 (H) 07/05/2018     Lab Results   Component Value Date    HDL 34 (L) 01/24/2019    HDL 37 (L) 10/25/2018    HDL 33 (L) 07/05/2018     No components found for: LDLCALC  Lab Results   Component Value Date    LDL 83 01/24/2019    LDL 64 10/25/2018    LDL  07/05/2018      Comment:      Unable to calculate     Lab Results   Component Value Date    LDL 83 01/24/2019    LDL 64 10/25/2018    LDL  07/05/2018      Comment:      Unable to calculate         on Crestor 20 mg daily   Generic causing myalgias    Return to brand name       Blood Pressure Management:    Vitals:    01/30/19 1032   BP: 142/70   Pulse: 90   SpO2: 95%         Per nephrology       Microvascular  Complication Monitoring:  No Microalbuminuria, No Diabetic Retinopathy, Date of last eye exam 07/18/2016, No Diabetic Neuropathy  on ACE i     ckd stage IV    followed by nephrology     I suggest that she doesn't consume more than 140 g protein per day   Plant better than animal but restricted on keto     --      Lab Results   Component Value Date    CREATININE 2.40 (H) 01/24/2019    BUN 48 (H) 01/24/2019     01/24/2019    K 4.3 01/24/2019     01/24/2019    CO2 26.0 01/24/2019         Lab Results   Component Value Date    CREATININE 2.40 (H) 01/24/2019    CREATININE 2.11 (H) 01/17/2019    CREATININE 2.09 (H) 12/12/2018         Immunizations:  Last pneumococcal immunization pneumovax before age 65     Flu Shot will have in Mid Nov 2016       Preventive Care:  No smoking      Weight Related:   Wt Readings from Last 3 Encounters:   01/30/19 (!) 171 kg (376 lb 6.4 oz)   01/17/19 (!) 179 kg (395 lb)   01/17/19 (!) 179 kg (395 lb 3.2 oz)     Body mass index is 62.64 kg/m².      prefers no bariatric surgery    Now on cpap       Bone Health    Lab Results   Component Value Date    PTH 61 01/24/2019    CALCIUM 8.7 01/24/2019    CALCIUM 8.5 01/24/2019     For JARETH     Was having hypercalcemia with rocatrol 0.25 three times weekly and on calcium    Now on sensipar 30 mg daily but without calcium    Start calcium low dose 400 mg daily     Last DXA more than 2 years ago     Thyroid Health  Lab Results   Component Value Date    TSH 4.400 01/24/2019     On levothyroxine 50 ug daily    Other Diabetes Related Aspects       Lab Results   Component Value Date    [id number] 896 01/24/2019     Lab Results   Component Value Date    WBC 8.90 01/24/2019    HGB 10.7 (L) 01/24/2019    HCT 32.5 (L) 01/24/2019    MCV 90.8 01/24/2019     01/24/2019     Lab Results   Component Value Date    IRON 42 07/24/2018    TIBC 352 07/24/2018    FERRITIN 20.50 07/24/2018       Anemia , managed by nephrology   Deciding vs epo vs iron   But  now   DUB, may need hysterectomy       Systolic Murmur, AS? Echo   Could be due to anemia     Echo and carotid US from 7-17 , normal echo and less than 50% blockage in carotids    I reviewed and summarized records from Ole Soliman MD from 2016 and I reviewed / ordered labs.     No orders of the defined types were placed in this encounter.        A copy of my note was sent to Ole Soliman MD    Please see my above opinion and suggestions.

## 2019-01-30 NOTE — PATIENT INSTRUCTIONS
Component      Latest Ref Rng & Units 1/17/2019 1/17/2019 1/24/2019           2:19 PM  2:19 PM  8:41 AM   WBC      4.80 - 10.80 10*3/mm3 10.28  8.90   RBC      4.20 - 5.40 10*6/mm3 3.42 (L)  3.58 (L)   Hemoglobin      12.0 - 16.0 g/dL 10.0 (L)  10.7 (L)   Hematocrit      37.0 - 47.0 % 31.1 (L)  32.5 (L)   MCV      82.0 - 98.0 fL 90.9  90.8   MCH      28.0 - 32.0 pg 29.2  29.9   MCHC      33.0 - 36.0 g/dL 32.2 (L)  32.9 (L)   RDW      12.0 - 15.0 % 17.0 (H)  17.6 (H)   RDW-SD      40.0 - 54.0 fl 56.9 (H)     MPV      6.0 - 12.0 fL 10.0  9.0   Platelets      130 - 400 10*3/mm3 225  229   Neutrophil Rel %      39.0 - 78.0 % 72.9  79.9 (H)   Lymphocyte Rel %      15.0 - 45.0 % 18.9  13.4 (L)   Monocyte Rel %      4.0 - 12.0 % 5.4     Eosinophil Rel %      0.0 - 4.0 % 2.0     Basophil Rel %      0.0 - 2.0 % 0.4     Immature Grans %      0.0 - 5.0 % 0.4     Neutrophils Absolute      1.50 - 8.30 10*3/mm3 7.49  7.10   Lymphocytes Absolute      0.80 - 7.00 10*3/mm3 1.94  1.20   Monocytes Absolute      0.19 - 1.30 10*3/mm3 0.56     Eosinophils Absolute      0.00 - 0.70 10*3/mm3 0.21     Basophils Absolute      0.00 - 0.20 10*3/mm3 0.04     Immature Grans, Absolute      0.00 - 0.03 10*3/mm3 0.04 (H)     nRBC      0.0 - 0.0 /100 WBC 0.0     Glucose      70 - 100 mg/dL 137 (H)  127 (H)   BUN      5 - 21 mg/dL 33 (H)  48 (H)   Creatinine      0.50 - 1.40 mg/dL 2.11 (H)  2.40 (H)   Sodium      135 - 145 mmol/L 139  139   Potassium      3.5 - 5.3 mmol/L 4.5  4.3   Chloride      98 - 110 mmol/L 102  103   CO2      24.0 - 31.0 mmol/L 26.0  26.0   Calcium      8.4 - 10.4 mg/dL 8.8  8.5   Total Protein      6.3 - 8.7 g/dL 7.3  7.3   Albumin      3.50 - 5.00 g/dL 4.00  3.70   ALT (SGPT)      0 - 54 U/L <15  20   AST (SGOT)      7 - 45 U/L 22  19   Alkaline Phosphatase      24 - 120 U/L 84  83   Total Bilirubin      0.1 - 1.0 mg/dL 0.4  0.5   eGFR Non African Am      >60 mL/min/1.73 24 (L)  21 (L)   Globulin      gm/dL 3.3  3.6   A/G  Ratio      1.1 - 2.5 g/dL 1.2  1.0 (L)   BUN/Creatinine Ratio       15.6  20.0   Anion Gap      4.0 - 13.0 mmol/L 11.0  10.0   Auto Mixed Cells %      0.1 - 24.0 %   6.7   Auto Mixed Cells #      0.10 - 2.60 10*3/mm3   0.60   Total Cholesterol      130 - 200 mg/dL   169   Triglycerides      0 - 149 mg/dL   258 (H)   HDL Cholesterol      >=50 mg/dL   34 (L)   LDL Cholesterol       0 - 99 mg/dL   83   VLDL Cholesterol      mg/dL   51.6   LDL/HDL Ratio         2.45   PTH Intact (Serial Monitor)      15 - 65 pg/mL      Creatinine, Urine      Not Estab. mg/dL   81.8   Microalbumin, Urine      Not Estab. ug/mL   1174.7   Microalbumin/Creatinine Ratio      0.0 - 30.0 mg/g creat   1436.1 (H)   Hemoglobin A1C      %   7.0   TSH Baseline      0.470 - 4.680 mIU/mL   4.400   Vitamin B-12      239 - 931 pg/mL   896   25 Hydroxy, Vitamin D      30.0 - 100.0 ng/ml   24.7 (L)   Extra Tube       Hold for add-ons. Hold for add-ons.      Component      Latest Ref Rng & Units 1/24/2019 1/24/2019           8:41 AM  8:41 AM   WBC      4.80 - 10.80 10*3/mm3     RBC      4.20 - 5.40 10*6/mm3     Hemoglobin      12.0 - 16.0 g/dL     Hematocrit      37.0 - 47.0 %     MCV      82.0 - 98.0 fL     MCH      28.0 - 32.0 pg     MCHC      33.0 - 36.0 g/dL     RDW      12.0 - 15.0 %     RDW-SD      40.0 - 54.0 fl     MPV      6.0 - 12.0 fL     Platelets      130 - 400 10*3/mm3     Neutrophil Rel %      39.0 - 78.0 %     Lymphocyte Rel %      15.0 - 45.0 %     Monocyte Rel %      4.0 - 12.0 %     Eosinophil Rel %      0.0 - 4.0 %     Basophil Rel %      0.0 - 2.0 %     Immature Grans %      0.0 - 5.0 %     Neutrophils Absolute      1.50 - 8.30 10*3/mm3     Lymphocytes Absolute      0.80 - 7.00 10*3/mm3     Monocytes Absolute      0.19 - 1.30 10*3/mm3     Eosinophils Absolute      0.00 - 0.70 10*3/mm3     Basophils Absolute      0.00 - 0.20 10*3/mm3     Immature Grans, Absolute      0.00 - 0.03 10*3/mm3     nRBC      0.0 - 0.0 /100 WBC     Glucose       70 - 100 mg/dL     BUN      5 - 21 mg/dL     Creatinine      0.50 - 1.40 mg/dL     Sodium      135 - 145 mmol/L     Potassium      3.5 - 5.3 mmol/L     Chloride      98 - 110 mmol/L     CO2      24.0 - 31.0 mmol/L     Calcium      8.4 - 10.4 mg/dL 8.7    Total Protein      6.3 - 8.7 g/dL     Albumin      3.50 - 5.00 g/dL     ALT (SGPT)      0 - 54 U/L     AST (SGOT)      7 - 45 U/L     Alkaline Phosphatase      24 - 120 U/L     Total Bilirubin      0.1 - 1.0 mg/dL     eGFR Non African Am      >60 mL/min/1.73     Globulin      gm/dL     A/G Ratio      1.1 - 2.5 g/dL     BUN/Creatinine Ratio           Anion Gap      4.0 - 13.0 mmol/L     Auto Mixed Cells %      0.1 - 24.0 %     Auto Mixed Cells #      0.10 - 2.60 10*3/mm3     Total Cholesterol      130 - 200 mg/dL     Triglycerides      0 - 149 mg/dL     HDL Cholesterol      >=50 mg/dL     LDL Cholesterol       0 - 99 mg/dL     VLDL Cholesterol      mg/dL     LDL/HDL Ratio           PTH Intact (Serial Monitor)      15 - 65 pg/mL 61 Comment   Creatinine, Urine      Not Estab. mg/dL     Microalbumin, Urine      Not Estab. ug/mL     Microalbumin/Creatinine Ratio      0.0 - 30.0 mg/g creat     Hemoglobin A1C      %     TSH Baseline      0.470 - 4.680 mIU/mL     Vitamin B-12      239 - 931 pg/mL     25 Hydroxy, Vitamin D      30.0 - 100.0 ng/ml     Extra Tube

## 2019-02-06 DIAGNOSIS — D63.1 ANEMIA IN STAGE 3 CHRONIC KIDNEY DISEASE (HCC): ICD-10-CM

## 2019-02-06 DIAGNOSIS — N18.30 ANEMIA IN STAGE 3 CHRONIC KIDNEY DISEASE (HCC): ICD-10-CM

## 2019-02-13 ENCOUNTER — OFFICE VISIT (OUTPATIENT)
Dept: ONCOLOGY | Facility: CLINIC | Age: 59
End: 2019-02-13

## 2019-02-13 ENCOUNTER — INFUSION (OUTPATIENT)
Dept: ONCOLOGY | Facility: HOSPITAL | Age: 59
End: 2019-02-13

## 2019-02-13 ENCOUNTER — LAB (OUTPATIENT)
Dept: LAB | Facility: HOSPITAL | Age: 59
End: 2019-02-13

## 2019-02-13 VITALS
SYSTOLIC BLOOD PRESSURE: 154 MMHG | RESPIRATION RATE: 18 BRPM | TEMPERATURE: 97.9 F | HEIGHT: 65 IN | BODY MASS INDEX: 48.82 KG/M2 | DIASTOLIC BLOOD PRESSURE: 78 MMHG | HEART RATE: 76 BPM | OXYGEN SATURATION: 95 % | WEIGHT: 293 LBS

## 2019-02-13 DIAGNOSIS — N18.30 ANEMIA IN STAGE 3 CHRONIC KIDNEY DISEASE (HCC): ICD-10-CM

## 2019-02-13 DIAGNOSIS — D63.1 ANEMIA IN STAGE 3 CHRONIC KIDNEY DISEASE (HCC): ICD-10-CM

## 2019-02-13 DIAGNOSIS — D63.1 ANEMIA IN STAGE 4 CHRONIC KIDNEY DISEASE (HCC): Primary | ICD-10-CM

## 2019-02-13 DIAGNOSIS — D63.1 ANEMIA IN STAGE 3 CHRONIC KIDNEY DISEASE (HCC): Primary | ICD-10-CM

## 2019-02-13 DIAGNOSIS — N18.4 ANEMIA IN STAGE 4 CHRONIC KIDNEY DISEASE (HCC): Primary | ICD-10-CM

## 2019-02-13 DIAGNOSIS — N18.30 ANEMIA IN STAGE 3 CHRONIC KIDNEY DISEASE (HCC): Primary | ICD-10-CM

## 2019-02-13 LAB
ALBUMIN SERPL-MCNC: 4.1 G/DL (ref 3.5–5)
ALBUMIN/GLOB SERPL: 1.3 G/DL (ref 1.1–2.5)
ALP SERPL-CCNC: 76 U/L (ref 24–120)
ALT SERPL W P-5'-P-CCNC: <15 U/L (ref 0–54)
ANION GAP SERPL CALCULATED.3IONS-SCNC: 8 MMOL/L (ref 4–13)
AST SERPL-CCNC: 24 U/L (ref 7–45)
BASOPHILS # BLD AUTO: 0.04 10*3/MM3 (ref 0–0.2)
BASOPHILS NFR BLD AUTO: 0.4 % (ref 0–2)
BILIRUB SERPL-MCNC: 0.3 MG/DL (ref 0.1–1)
BUN BLD-MCNC: 50 MG/DL (ref 5–21)
BUN/CREAT SERPL: 25.4 (ref 7–25)
CALCIUM SPEC-SCNC: 9.2 MG/DL (ref 8.4–10.4)
CHLORIDE SERPL-SCNC: 105 MMOL/L (ref 98–110)
CO2 SERPL-SCNC: 26 MMOL/L (ref 24–31)
CREAT BLD-MCNC: 1.97 MG/DL (ref 0.5–1.4)
DEPRECATED RDW RBC AUTO: 55.8 FL (ref 40–54)
EOSINOPHIL # BLD AUTO: 0.18 10*3/MM3 (ref 0–0.7)
EOSINOPHIL NFR BLD AUTO: 1.8 % (ref 0–4)
ERYTHROCYTE [DISTWIDTH] IN BLOOD BY AUTOMATED COUNT: 16.7 % (ref 12–15)
GFR SERPL CREATININE-BSD FRML MDRD: 26 ML/MIN/1.73
GLOBULIN UR ELPH-MCNC: 3.2 GM/DL
GLUCOSE BLD-MCNC: 127 MG/DL (ref 70–100)
HCT VFR BLD AUTO: 32.2 % (ref 37–47)
HGB BLD-MCNC: 10.3 G/DL (ref 12–16)
HOLD SPECIMEN: NORMAL
IMM GRANULOCYTES # BLD AUTO: 0.05 10*3/MM3 (ref 0–0.05)
IMM GRANULOCYTES NFR BLD AUTO: 0.5 % (ref 0–5)
LYMPHOCYTES # BLD AUTO: 1.7 10*3/MM3 (ref 0.72–4.86)
LYMPHOCYTES NFR BLD AUTO: 16.8 % (ref 15–45)
MCH RBC QN AUTO: 29.1 PG (ref 28–32)
MCHC RBC AUTO-ENTMCNC: 32 G/DL (ref 33–36)
MCV RBC AUTO: 91 FL (ref 82–98)
MONOCYTES # BLD AUTO: 0.49 10*3/MM3 (ref 0.19–1.3)
MONOCYTES NFR BLD AUTO: 4.8 % (ref 4–12)
NEUTROPHILS # BLD AUTO: 7.68 10*3/MM3 (ref 1.87–8.4)
NEUTROPHILS NFR BLD AUTO: 75.7 % (ref 39–78)
NRBC BLD AUTO-RTO: 0 /100 WBC (ref 0–0)
PLATELET # BLD AUTO: 218 10*3/MM3 (ref 130–400)
PMV BLD AUTO: 10.8 FL (ref 6–12)
POTASSIUM BLD-SCNC: 4.7 MMOL/L (ref 3.5–5.3)
PROT SERPL-MCNC: 7.3 G/DL (ref 6.3–8.7)
RBC # BLD AUTO: 3.54 10*6/MM3 (ref 4.2–5.4)
SODIUM BLD-SCNC: 139 MMOL/L (ref 135–145)
WBC NRBC COR # BLD: 10.14 10*3/MM3 (ref 4.8–10.8)

## 2019-02-13 PROCEDURE — 99214 OFFICE O/P EST MOD 30 MIN: CPT | Performed by: INTERNAL MEDICINE

## 2019-02-13 PROCEDURE — 80053 COMPREHEN METABOLIC PANEL: CPT | Performed by: INTERNAL MEDICINE

## 2019-02-13 PROCEDURE — 36415 COLL VENOUS BLD VENIPUNCTURE: CPT

## 2019-02-13 PROCEDURE — 85025 COMPLETE CBC W/AUTO DIFF WBC: CPT | Performed by: INTERNAL MEDICINE

## 2019-02-13 PROCEDURE — 96372 THER/PROPH/DIAG INJ SC/IM: CPT

## 2019-02-13 PROCEDURE — 25010000002 EPOETIN ALFA PER 1000 UNITS: Performed by: INTERNAL MEDICINE

## 2019-02-13 RX ADMIN — ERYTHROPOIETIN 40000 UNITS: 40000 INJECTION, SOLUTION INTRAVENOUS; SUBCUTANEOUS at 14:59

## 2019-02-13 NOTE — PROGRESS NOTES
[facility]  HEMATOLOGY & ONCOLOGY    Cancer Staging Information:  Cancer Staging  No matching staging information was found for the patient.      Subjective     VISIT DIAGNOSIS:   Encounter Diagnosis   Name Primary?   • Anemia in stage 3 chronic kidney disease (CMS/HCC)        REASON FOR VISIT:     Chief Complaint   Patient presents with   • Anemia     She is here for f/u visit today and to review her recent lab work and possible Procrit injection   • Weight Loss     Wt loss is intentional she is currently on the Keto Diet,         HEMATOLOGY / ONCOLOGY HISTORY:    No history exists.           INTERVAL HISTORY  Patient ID: Maria C Shrestha is a 58 y.o. year old female Cancer Staging  2/13/19: no issues or concerns. Denies sob,cp,n/v/dizziness.occassional blood streaked stole from hemorrhoids.  --rest of ros unremarkable    Past Medical History:   Past Medical History:   Diagnosis Date   • Acquired hypothyroidism 2/6/2017   • Allergic    • Anemia    • Anxiety    • Arthritis    • Cellulitis    • Chronic kidney disease     3rd stage   • Depression    • Disease of thyroid gland    • Dyslipidemia    • Elevated cholesterol    • Essential hypertension    • Fatigue    • Gallbladder abscess    • Hearing loss    • Light headed    • Limited range of motion (ROM) of shoulder     right   • Obesity    • Palpitation    • Short of breath on exertion    • Sleep apnea    • Type 2 diabetes mellitus (CMS/HCC)    • Type II diabetes mellitus, uncontrolled (CMS/HCC)    • Wears glasses      Past Surgical History:   Past Surgical History:   Procedure Laterality Date   • ADENOIDECTOMY     • ANAL FISTULA REPAIR      x 2    • CHOLECYSTECTOMY     • COLONOSCOPY  01/02/2014   • COLONOSCOPY N/A 3/22/2017    Procedure: COLONOSCOPY WITH ANESTHESIA;  Surgeon: Mono Linder MD;  Location: [facility] ENDOSCOPY;  Service:    • D&C HYSTEROSCOPY N/A 7/25/2018    Procedure: DILATATION AND CURETTAGE HYSTEROSCOPY;  Surgeon: Michael Castaneda  MD;  Location: [facility] OR;  Service: Obstetrics/Gynecology   • DILATATION AND CURETTAGE      X 2   • ENDOSCOPY     • TONSILLECTOMY       Social History:   Social History     Socioeconomic History   • Marital status:      Spouse name: Not on file   • Number of children: Not on file   • Years of education: Not on file   • Highest education level: Not on file   Social Needs   • Financial resource strain: Not on file   • Food insecurity - worry: Not on file   • Food insecurity - inability: Not on file   • Transportation needs - medical: Not on file   • Transportation needs - non-medical: Not on file   Occupational History   • Not on file   Tobacco Use   • Smoking status: Never Smoker   • Smokeless tobacco: Never Used   Substance and Sexual Activity   • Alcohol use: No   • Drug use: No   • Sexual activity: No     Birth control/protection: Post-menopausal   Other Topics Concern   • Not on file   Social History Narrative   • Not on file     Family History:   Family History   Problem Relation Age of Onset   • Diabetes Other    • Cancer Mother    • Cancer Father    • Heart disease Father    • Diabetes Father    • Obesity Father    • Stroke Father    • Cancer Maternal Grandmother    • Uterine cancer Maternal Grandmother    • Diabetes Maternal Grandfather    • Cancer Paternal Grandmother    • Colon cancer Paternal Grandmother    • Diabetes Paternal Grandfather    • Heart disease Paternal Grandfather    • No Known Problems Sister    • No Known Problems Brother    • No Known Problems Daughter    • No Known Problems Son    • No Known Problems Maternal Aunt    • No Known Problems Paternal Aunt    • BRCA 1/2 Neg Hx    • Breast cancer Neg Hx    • Endometrial cancer Neg Hx    • Ovarian cancer Neg Hx        Review of Systems     Performance Status:  Asymptomatic    Medications:    Current Outpatient Medications   Medication Sig Dispense Refill   • allopurinol (ZYLOPRIM) 100 MG tablet Take 100 mg by mouth 2 (Two) Times a Day.    "  • amLODIPine (NORVASC) 5 MG tablet      • aspirin 81 MG tablet Take 81 mg by mouth Daily. Stop 7/22/2018     • busPIRone (BUSPAR) 10 MG tablet buspirone 10 mg tablet   Take 2 tablets twice a day by oral route as needed for 90 days.     • carvedilol (COREG) 3.125 MG tablet Take 3.125 mg by mouth 2 (Two) Times a Day.     • cetirizine (zyrTEC) 10 MG tablet Take 10 mg by mouth As Needed.     • Cholecalciferol (VITAMIN D3) 14768 units tablet Vitamin D2 50,000 unit capsule   Take 1 capsule every week by oral route. Sunday     • cinacalcet (SENSIPAR) 30 MG tablet Take 1 tablet by mouth Daily. 30 tablet 11   • citalopram (CeleXA) 20 MG tablet Take 20 mg by mouth Daily.     • diazePAM (VALIUM) 2 MG tablet Take 2 mg by mouth As Needed for Anxiety.     • fluticasone (VERAMYST) 27.5 MCG/SPRAY nasal spray 2 sprays into each nostril Daily.     • Glucose Blood (BLOOD GLUCOSE TEST) strip Use 4 x daily, use any brand covered by insurance or same brand as before 360 each 3   • ibuprofen (ADVIL,MOTRIN) 800 MG tablet Take 800 mg by mouth Every 8 (Eight) Hours As Needed for Mild Pain .     • insulin glargine (LANTUS) 100 UNIT/ML injection 200 units daily (Patient taking differently: Inject 180 Units under the skin Every Night. 200 units daily) 18 each 3   • Insulin Lispro (HUMALOG KWIKPEN) 200 UNIT/ML solution pen-injector Inject 30 Units under the skin into the appropriate area as directed 3 (Three) Times a Day With Meals. 5 pen 11   • Insulin Syringe 31G X 5/16\" 1 ML misc 4 x daily 120 each 11   • lamoTRIgine (LaMICtal) 50 MG tablet dispersible disintegrating tablet Take 50 mg by mouth Every Night.     • Lancets (FREESTYLE) lancets FOUR TIMES A  each 11   • levothyroxine (SYNTHROID, LEVOTHROID) 50 MCG tablet Take 1 tablet by mouth Daily. 90 tablet 1   • lisinopril (PRINIVIL,ZESTRIL) 20 MG tablet Take 20 mg by mouth 2 (Two) Times a Day.     • Multiple Vitamins-Minerals (MULTIVITAMIN ADULT PO) Take 1 tablet by mouth Daily.   " "  • potassium gluconate 595 (99 K) MG tablet tablet potassium gluconate 595 mg (99 mg) tablet   Take 2 tablets every day by oral route.     • rosuvastatin (CRESTOR) 10 MG tablet Take 10 mg by mouth Daily.     • sodium bicarbonate 650 MG tablet Take 650 mg by mouth 3 (Three) Times a Day.     • traMADol (ULTRAM) 50 MG tablet Take 1 tablet by mouth Every 6 (Six) Hours As Needed for Moderate Pain . 8 tablet 0   • TRULICITY 1.5 MG/0.5ML solution pen-injector INJECT 1.5 MG UNDER THE SKIN EVERY 7 DAYS (Patient taking differently: INJECT 1.5 MG UNDER THE SKIN EVERY 7 DAYS Sunday) 6 mL 3     No current facility-administered medications for this visit.        ALLERGIES:    Allergies   Allergen Reactions   • Codeine Nausea Only       Objective      Vitals:    02/13/19 1402   BP: 154/78   Pulse: 76   Resp: 18   Temp: 97.9 °F (36.6 °C)   TempSrc: Tympanic   SpO2: 95%   Weight: (!) 170 kg (375 lb)   Height: 165.1 cm (65\")   PainSc: 0-No pain         Current Status 2/13/2019   ECOG score 2         Physical Exam  General Appearance:obese female.  Patient is awake, alert, oriented and in no acute distress. Patient is welldeveloped, wellnourished, and appears stated age.  HEENT: Normocephalic. Sclerae clear, conjunctiva pink, extraocular movements intact, pupils, round, reactive to light and  accommodation. Mouth and throat are clear with moist oral mucosa.  NECK: Supple, no jugular venous distention, thyroid not enlarged.  LYMPH: No cervical, supraclavicular, axillary, or inguinal lymphadenopathy.  CHEST: Equal bilateral expansion, AP  diameter normal, resonant percussion note  LUNGS: Good air movement, no rales, rhonchi, rubs or wheezes with auscultation  CARDIO: Regular sinus rhythm, no murmurs, gallops or rubs.  ABDOMEN: obese abdomen. Nondistended, soft, No tenderness, no guarding, no rebound, No hepatosplenomegaly. No abdominal masses. Bowel sounds positive. No hernia  GENITALIA: Not examined.  BREASTS: Not examined.  MUSKEL: " No joint swelling, decreased motion, or inflammation  EXTREMS: No edema, clubbing, cyanosis, No varicose veins.  NEURO: Grossly nonfocal, Gait is coordinated and smooth, Cognition is preserved.  SKIN: No rashes, no ecchymoses, no petechia.  PSYCH: Oriented to time, place and person. Memory is preserved. Mood and affect appear normal  RECENT LABS:  Lab on 02/13/2019   Component Date Value Ref Range Status   • Glucose 02/13/2019 127* 70 - 100 mg/dL Final   • BUN 02/13/2019 50* 5 - 21 mg/dL Final   • Creatinine 02/13/2019 1.97* 0.50 - 1.40 mg/dL Final   • Sodium 02/13/2019 139  135 - 145 mmol/L Final   • Potassium 02/13/2019 4.7  3.5 - 5.3 mmol/L Final   • Chloride 02/13/2019 105  98 - 110 mmol/L Final   • CO2 02/13/2019 26.0  24.0 - 31.0 mmol/L Final   • Calcium 02/13/2019 9.2  8.4 - 10.4 mg/dL Final   • Total Protein 02/13/2019 7.3  6.3 - 8.7 g/dL Final   • Albumin 02/13/2019 4.10  3.50 - 5.00 g/dL Final   • ALT (SGPT) 02/13/2019 <15  0 - 54 U/L Final   • AST (SGOT) 02/13/2019 24  7 - 45 U/L Final   • Alkaline Phosphatase 02/13/2019 76  24 - 120 U/L Final   • Total Bilirubin 02/13/2019 0.3  0.1 - 1.0 mg/dL Final   • eGFR Non African Amer 02/13/2019 26* >60 mL/min/1.73 Final   • Globulin 02/13/2019 3.2  gm/dL Final   • A/G Ratio 02/13/2019 1.3  1.1 - 2.5 g/dL Final   • BUN/Creatinine Ratio 02/13/2019 25.4* 7.0 - 25.0 Final   • Anion Gap 02/13/2019 8.0  4.0 - 13.0 mmol/L Final   • WBC 02/13/2019 10.14  4.80 - 10.80 10*3/mm3 Final   • RBC 02/13/2019 3.54* 4.20 - 5.40 10*6/mm3 Final   • Hemoglobin 02/13/2019 10.3* 12.0 - 16.0 g/dL Final   • Hematocrit 02/13/2019 32.2* 37.0 - 47.0 % Final   • MCV 02/13/2019 91.0  82.0 - 98.0 fL Final   • MCH 02/13/2019 29.1  28.0 - 32.0 pg Final   • MCHC 02/13/2019 32.0* 33.0 - 36.0 g/dL Final   • RDW 02/13/2019 16.7* 12.0 - 15.0 % Final   • RDW-SD 02/13/2019 55.8* 40.0 - 54.0 fl Final   • MPV 02/13/2019 10.8  6.0 - 12.0 fL Final   • Platelets 02/13/2019 218  130 - 400 10*3/mm3 Final   •  Neutrophil % 02/13/2019 75.7  39.0 - 78.0 % Final   • Lymphocyte % 02/13/2019 16.8  15.0 - 45.0 % Final   • Monocyte % 02/13/2019 4.8  4.0 - 12.0 % Final   • Eosinophil % 02/13/2019 1.8  0.0 - 4.0 % Final   • Basophil % 02/13/2019 0.4  0.0 - 2.0 % Final   • Immature Grans % 02/13/2019 0.5  0.0 - 5.0 % Final   • Neutrophils, Absolute 02/13/2019 7.68  1.87 - 8.40 10*3/mm3 Final   • Lymphocytes, Absolute 02/13/2019 1.70  0.72 - 4.86 10*3/mm3 Final   • Monocytes, Absolute 02/13/2019 0.49  0.19 - 1.30 10*3/mm3 Final   • Eosinophils, Absolute 02/13/2019 0.18  0.00 - 0.70 10*3/mm3 Final   • Basophils, Absolute 02/13/2019 0.04  0.00 - 0.20 10*3/mm3 Final   • Immature Grans, Absolute 02/13/2019 0.05  0.00 - 0.05 10*3/mm3 Final   • nRBC 02/13/2019 0.0  0.0 - 0.0 /100 WBC Final   • Extra Tube 02/13/2019 Hold for add-ons.   Final    Auto resulted.       RADIOLOGY:  No results found.         Assessment/Plan  Maria C Shrestha is a 58 y.o. year old female  Whom we are seeing for AoCKD on procrit that is stable.    Patient Active Problem List   Diagnosis   • Type 2 diabetes mellitus with stage 3 chronic kidney disease, with long-term current use of insulin (CMS/[facility])   • Mixed diabetic hyperlipidemia associated with type 2 diabetes mellitus (CMS/[facility])   • Hypertension associated with diabetes (CMS/[facility])   • Vitamin D deficiency   • B12 deficiency   • Anemia in CKD (chronic kidney disease)   • Acquired hypothyroidism   • Chronic kidney disease, stage 4 (severe) (CMS/[facility])          1. AoCKD: s/p iron infusios.   --curently on procrit monthly as needed    2. HTN: on amlodipine, carvedilol, lisinopril    3.DM: on insulin    4.Hypothyroidism: on synthroid  5. Anxiety/depression: on diazepam, celexa  6. Hyperlipidemia: on crestor.        Rodrigo Huitron MD    2/13/2019    10:05 AM

## 2019-03-12 ENCOUNTER — TELEPHONE (OUTPATIENT)
Dept: ENDOCRINOLOGY | Facility: CLINIC | Age: 59
End: 2019-03-12

## 2019-03-12 RX ORDER — PEN NEEDLE, DIABETIC 31 G X1/4"
NEEDLE, DISPOSABLE MISCELLANEOUS
Qty: 200 EACH | Refills: 11 | Status: SHIPPED | OUTPATIENT
Start: 2019-03-12 | End: 2020-06-19

## 2019-03-12 NOTE — TELEPHONE ENCOUNTER
Pt needs a box of pen needles for humalog sent to [facility] in [place].     Also, please send in a refill on trulicity sent to [facility].

## 2019-03-13 ENCOUNTER — TELEPHONE (OUTPATIENT)
Dept: ENDOCRINOLOGY | Facility: CLINIC | Age: 59
End: 2019-03-13

## 2019-03-13 ENCOUNTER — INFUSION (OUTPATIENT)
Dept: ONCOLOGY | Facility: HOSPITAL | Age: 59
End: 2019-03-13

## 2019-03-13 ENCOUNTER — LAB (OUTPATIENT)
Dept: LAB | Facility: HOSPITAL | Age: 59
End: 2019-03-13
Attending: INTERNAL MEDICINE

## 2019-03-13 ENCOUNTER — OFFICE VISIT (OUTPATIENT)
Dept: ONCOLOGY | Facility: CLINIC | Age: 59
End: 2019-03-13

## 2019-03-13 VITALS
BODY MASS INDEX: 48.82 KG/M2 | HEART RATE: 61 BPM | OXYGEN SATURATION: 100 % | HEIGHT: 65 IN | WEIGHT: 293 LBS | SYSTOLIC BLOOD PRESSURE: 150 MMHG | TEMPERATURE: 98.6 F | DIASTOLIC BLOOD PRESSURE: 61 MMHG | RESPIRATION RATE: 20 BRPM

## 2019-03-13 VITALS
SYSTOLIC BLOOD PRESSURE: 142 MMHG | BODY MASS INDEX: 48.82 KG/M2 | HEIGHT: 65 IN | OXYGEN SATURATION: 98 % | HEART RATE: 60 BPM | TEMPERATURE: 99.1 F | DIASTOLIC BLOOD PRESSURE: 84 MMHG | RESPIRATION RATE: 16 BRPM | WEIGHT: 293 LBS

## 2019-03-13 DIAGNOSIS — N18.30 ANEMIA IN STAGE 3 CHRONIC KIDNEY DISEASE (HCC): ICD-10-CM

## 2019-03-13 DIAGNOSIS — D63.1 ANEMIA IN STAGE 3 CHRONIC KIDNEY DISEASE (HCC): ICD-10-CM

## 2019-03-13 DIAGNOSIS — D63.1 ANEMIA IN STAGE 3 CHRONIC KIDNEY DISEASE (HCC): Primary | ICD-10-CM

## 2019-03-13 DIAGNOSIS — N18.30 ANEMIA IN STAGE 3 CHRONIC KIDNEY DISEASE (HCC): Primary | ICD-10-CM

## 2019-03-13 LAB
ALBUMIN SERPL-MCNC: 4 G/DL (ref 3.5–5)
ALBUMIN/GLOB SERPL: 1.3 G/DL (ref 1.1–2.5)
ALP SERPL-CCNC: 74 U/L (ref 24–120)
ALT SERPL W P-5'-P-CCNC: <15 U/L (ref 0–54)
ANION GAP SERPL CALCULATED.3IONS-SCNC: 10 MMOL/L (ref 4–13)
AST SERPL-CCNC: 19 U/L (ref 7–45)
BASOPHILS # BLD AUTO: 0.04 10*3/MM3 (ref 0–0.2)
BASOPHILS NFR BLD AUTO: 0.4 % (ref 0–2)
BILIRUB SERPL-MCNC: 0.3 MG/DL (ref 0.1–1)
BUN BLD-MCNC: 46 MG/DL (ref 5–21)
BUN/CREAT SERPL: 23.2 (ref 7–25)
CALCIUM SPEC-SCNC: 8.7 MG/DL (ref 8.4–10.4)
CHLORIDE SERPL-SCNC: 109 MMOL/L (ref 98–110)
CO2 SERPL-SCNC: 25 MMOL/L (ref 24–31)
CREAT BLD-MCNC: 1.98 MG/DL (ref 0.5–1.4)
DEPRECATED RDW RBC AUTO: 58.1 FL (ref 40–54)
EOSINOPHIL # BLD AUTO: 0.2 10*3/MM3 (ref 0–0.7)
EOSINOPHIL NFR BLD AUTO: 2 % (ref 0–4)
ERYTHROCYTE [DISTWIDTH] IN BLOOD BY AUTOMATED COUNT: 16.8 % (ref 12–15)
FERRITIN SERPL-MCNC: 72.6 NG/ML (ref 11.1–264)
GFR SERPL CREATININE-BSD FRML MDRD: 26 ML/MIN/1.73
GLOBULIN UR ELPH-MCNC: 3 GM/DL
GLUCOSE BLD-MCNC: 118 MG/DL (ref 70–100)
HCT VFR BLD AUTO: 31.8 % (ref 37–47)
HGB BLD-MCNC: 10 G/DL (ref 12–16)
IMM GRANULOCYTES # BLD AUTO: 0.06 10*3/MM3 (ref 0–0.05)
IMM GRANULOCYTES NFR BLD AUTO: 0.6 % (ref 0–5)
IRON 24H UR-MRATE: 57 MCG/DL (ref 42–180)
IRON SATN MFR SERPL: 20 % (ref 20–45)
LYMPHOCYTES # BLD AUTO: 1.61 10*3/MM3 (ref 0.72–4.86)
LYMPHOCYTES NFR BLD AUTO: 16.3 % (ref 15–45)
MCH RBC QN AUTO: 29.6 PG (ref 28–32)
MCHC RBC AUTO-ENTMCNC: 31.4 G/DL (ref 33–36)
MCV RBC AUTO: 94.1 FL (ref 82–98)
MONOCYTES # BLD AUTO: 0.52 10*3/MM3 (ref 0.19–1.3)
MONOCYTES NFR BLD AUTO: 5.3 % (ref 4–12)
NEUTROPHILS # BLD AUTO: 7.46 10*3/MM3 (ref 1.87–8.4)
NEUTROPHILS NFR BLD AUTO: 75.4 % (ref 39–78)
NRBC BLD AUTO-RTO: 0 /100 WBC (ref 0–0)
PLATELET # BLD AUTO: 224 10*3/MM3 (ref 130–400)
PMV BLD AUTO: 10.7 FL (ref 6–12)
POTASSIUM BLD-SCNC: 4.4 MMOL/L (ref 3.5–5.3)
PROT SERPL-MCNC: 7 G/DL (ref 6.3–8.7)
RBC # BLD AUTO: 3.38 10*6/MM3 (ref 4.2–5.4)
SODIUM BLD-SCNC: 144 MMOL/L (ref 135–145)
TIBC SERPL-MCNC: 280 MCG/DL (ref 225–420)
WBC NRBC COR # BLD: 9.89 10*3/MM3 (ref 4.8–10.8)

## 2019-03-13 PROCEDURE — 25010000002 EPOETIN ALFA PER 1000 UNITS: Performed by: INTERNAL MEDICINE

## 2019-03-13 PROCEDURE — 83540 ASSAY OF IRON: CPT | Performed by: INTERNAL MEDICINE

## 2019-03-13 PROCEDURE — 96372 THER/PROPH/DIAG INJ SC/IM: CPT

## 2019-03-13 PROCEDURE — 99213 OFFICE O/P EST LOW 20 MIN: CPT | Performed by: INTERNAL MEDICINE

## 2019-03-13 PROCEDURE — 83550 IRON BINDING TEST: CPT | Performed by: INTERNAL MEDICINE

## 2019-03-13 PROCEDURE — 85025 COMPLETE CBC W/AUTO DIFF WBC: CPT | Performed by: INTERNAL MEDICINE

## 2019-03-13 PROCEDURE — 80053 COMPREHEN METABOLIC PANEL: CPT | Performed by: INTERNAL MEDICINE

## 2019-03-13 PROCEDURE — 36415 COLL VENOUS BLD VENIPUNCTURE: CPT | Performed by: INTERNAL MEDICINE

## 2019-03-13 PROCEDURE — 82728 ASSAY OF FERRITIN: CPT | Performed by: INTERNAL MEDICINE

## 2019-03-13 RX ADMIN — ERYTHROPOIETIN 40000 UNITS: 40000 INJECTION, SOLUTION INTRAVENOUS; SUBCUTANEOUS at 13:36

## 2019-03-13 NOTE — PROGRESS NOTES
[facility]  HEMATOLOGY & ONCOLOGY    Cancer Staging Information:  Cancer Staging  No matching staging information was found for the patient.      Subjective     VISIT DIAGNOSIS:   Encounter Diagnosis   Name Primary?   • Anemia in stage 3 chronic kidney disease (CMS/HCC)        REASON FOR VISIT:     Chief Complaint   Patient presents with   • Anemia     Possible procrit today        HEMATOLOGY / ONCOLOGY HISTORY:    No history exists.           INTERVAL HISTORY  Patient ID: Maria C Shrestha is a 58 y.o. year old female Cancer Staging  3/13/19: no issues or concerns. Follows up with renal. Denies sob,cp,n/v/dizziness.occassional blood streaked stole from hemorrhoids.  --rest of ros unremarkable    Past Medical History:   Past Medical History:   Diagnosis Date   • Acquired hypothyroidism 2/6/2017   • Allergic    • Anemia    • Anxiety    • Arthritis    • Cellulitis    • Chronic kidney disease     3rd stage   • Depression    • Disease of thyroid gland    • Dyslipidemia    • Elevated cholesterol    • Essential hypertension    • Fatigue    • Gallbladder abscess    • Hearing loss    • Light headed    • Limited range of motion (ROM) of shoulder     right   • Obesity    • Palpitation    • Short of breath on exertion    • Sleep apnea    • Type 2 diabetes mellitus (CMS/HCC)    • Type II diabetes mellitus, uncontrolled (CMS/HCC)    • Wears glasses      Past Surgical History:   Past Surgical History:   Procedure Laterality Date   • ADENOIDECTOMY     • ANAL FISTULA REPAIR      x 2    • CHOLECYSTECTOMY     • COLONOSCOPY  01/02/2014   • COLONOSCOPY N/A 3/22/2017    Procedure: COLONOSCOPY WITH ANESTHESIA;  Surgeon: Mono Linder MD;  Location: [facility] ENDOSCOPY;  Service:    • D&C HYSTEROSCOPY N/A 7/25/2018    Procedure: DILATATION AND CURETTAGE HYSTEROSCOPY;  Surgeon: Michael Castaneda MD;  Location: [facility] OR;  Service: Obstetrics/Gynecology   • DILATATION AND CURETTAGE      X 2   • ENDOSCOPY     • TONSILLECTOMY        Social History:   Social History     Socioeconomic History   • Marital status:      Spouse name: Not on file   • Number of children: Not on file   • Years of education: Not on file   • Highest education level: Not on file   Social Needs   • Financial resource strain: Not on file   • Food insecurity - worry: Not on file   • Food insecurity - inability: Not on file   • Transportation needs - medical: Not on file   • Transportation needs - non-medical: Not on file   Occupational History   • Not on file   Tobacco Use   • Smoking status: Never Smoker   • Smokeless tobacco: Never Used   Substance and Sexual Activity   • Alcohol use: No   • Drug use: No   • Sexual activity: No     Birth control/protection: Post-menopausal   Other Topics Concern   • Not on file   Social History Narrative   • Not on file     Family History:   Family History   Problem Relation Age of Onset   • Diabetes Other    • Cancer Mother    • Cancer Father    • Heart disease Father    • Diabetes Father    • Obesity Father    • Stroke Father    • Cancer Maternal Grandmother    • Uterine cancer Maternal Grandmother    • Diabetes Maternal Grandfather    • Cancer Paternal Grandmother    • Colon cancer Paternal Grandmother    • Diabetes Paternal Grandfather    • Heart disease Paternal Grandfather    • No Known Problems Sister    • No Known Problems Brother    • No Known Problems Daughter    • No Known Problems Son    • No Known Problems Maternal Aunt    • No Known Problems Paternal Aunt    • BRCA 1/2 Neg Hx    • Breast cancer Neg Hx    • Endometrial cancer Neg Hx    • Ovarian cancer Neg Hx        Review of Systems       Performance Status:  Asymptomatic    Medications:    Current Outpatient Medications   Medication Sig Dispense Refill   • allopurinol (ZYLOPRIM) 100 MG tablet Take 100 mg by mouth 2 (Two) Times a Day.     • amLODIPine (NORVASC) 5 MG tablet      • aspirin 81 MG tablet Take 81 mg by mouth Daily. Stop 7/22/2018     • busPIRone  "(BUSPAR) 10 MG tablet buspirone 10 mg tablet   Take 2 tablets twice a day by oral route as needed for 90 days.     • carvedilol (COREG) 3.125 MG tablet Take 3.125 mg by mouth 2 (Two) Times a Day.     • cetirizine (zyrTEC) 10 MG tablet Take 10 mg by mouth As Needed.     • Cholecalciferol (VITAMIN D3) 24434 units tablet Vitamin D2 50,000 unit capsule   Take 1 capsule every week by oral route. Sunday     • cinacalcet (SENSIPAR) 30 MG tablet Take 1 tablet by mouth Daily. 30 tablet 11   • citalopram (CeleXA) 20 MG tablet Take 20 mg by mouth Daily.     • diazePAM (VALIUM) 2 MG tablet Take 2 mg by mouth As Needed for Anxiety.     • Dulaglutide (TRULICITY) 1.5 MG/0.5ML solution pen-injector Inject 1.5 mg under the skin into the appropriate area as directed Every 7 (Seven) Days. 6 mL 3   • fluticasone (VERAMYST) 27.5 MCG/SPRAY nasal spray 2 sprays into each nostril Daily.     • Glucose Blood (BLOOD GLUCOSE TEST) strip Use 4 x daily, use any brand covered by insurance or same brand as before 360 each 3   • ibuprofen (ADVIL,MOTRIN) 800 MG tablet Take 800 mg by mouth Every 8 (Eight) Hours As Needed for Mild Pain .     • insulin glargine (LANTUS) 100 UNIT/ML injection 200 units daily (Patient taking differently: Inject 180 Units under the skin Every Night. 200 units daily) 18 each 3   • Insulin Lispro (HUMALOG KWIKPEN) 200 UNIT/ML solution pen-injector Inject 30 Units under the skin into the appropriate area as directed 3 (Three) Times a Day With Meals. 5 pen 11   • Insulin Pen Needle (PEN NEEDLES) 31G X 6 MM misc Use to inject insulin 4 times daily. 200 each 11   • Insulin Syringe 31G X 5/16\" 1 ML misc 4 x daily 120 each 11   • lamoTRIgine (LaMICtal) 50 MG tablet dispersible disintegrating tablet Take 50 mg by mouth Every Night.     • Lancets (FREESTYLE) lancets FOUR TIMES A  each 11   • levothyroxine (SYNTHROID, LEVOTHROID) 50 MCG tablet Take 1 tablet by mouth Daily. 90 tablet 1   • lisinopril (PRINIVIL,ZESTRIL) 20 MG " "tablet Take 20 mg by mouth 2 (Two) Times a Day.     • Multiple Vitamins-Minerals (MULTIVITAMIN ADULT PO) Take 1 tablet by mouth Daily.     • potassium gluconate 595 (99 K) MG tablet tablet potassium gluconate 595 mg (99 mg) tablet   Take 2 tablets every day by oral route.     • rosuvastatin (CRESTOR) 10 MG tablet Take 10 mg by mouth Daily.     • sodium bicarbonate 650 MG tablet Take 650 mg by mouth 3 (Three) Times a Day.     • traMADol (ULTRAM) 50 MG tablet Take 1 tablet by mouth Every 6 (Six) Hours As Needed for Moderate Pain . 8 tablet 0     No current facility-administered medications for this visit.        ALLERGIES:    Allergies   Allergen Reactions   • Codeine Nausea Only       Objective      Vitals:    03/13/19 1304   BP: 142/84   Pulse: 60   Resp: 16   Temp: 99.1 °F (37.3 °C)   TempSrc: Oral   SpO2: 98%   Weight: (!) 171 kg (377 lb 6.4 oz)   Height: 165.1 cm (65\")         Current Status 3/13/2019   ECOG score 1         Physical Exam    General Appearance:obese female.  Patient is awake, alert, oriented and in no acute distress. Patient is welldeveloped, wellnourished, and appears stated age.  HEENT: Normocephalic. Sclerae clear, conjunctiva pink, extraocular movements intact, pupils, round, reactive to light and  accommodation. Mouth and throat are clear with moist oral mucosa.  NECK: Supple, no jugular venous distention, thyroid not enlarged.  LYMPH: No cervical, supraclavicular, axillary, or inguinal lymphadenopathy.  CHEST: Equal bilateral expansion, AP  diameter normal, resonant percussion note  LUNGS: Good air movement, no rales, rhonchi, rubs or wheezes with auscultation  CARDIO: Regular sinus rhythm, no murmurs, gallops or rubs.  ABDOMEN: obese abdomen. Nondistended, soft, No tenderness, no guarding, no rebound, No hepatosplenomegaly. No abdominal masses. Bowel sounds positive. No hernia  GENITALIA: Not examined.  BREASTS: Not examined.  MUSKEL: No joint swelling, decreased motion, or " inflammation  EXTREMS: No edema, clubbing, cyanosis, No varicose veins.  NEURO: Grossly nonfocal, Gait is coordinated and smooth, Cognition is preserved.  SKIN: No rashes, no ecchymoses, no petechia.  PSYCH: Oriented to time, place and person. Memory is preserved. Mood and affect appear normal  RECENT LABS:  Lab on 03/13/2019   Component Date Value Ref Range Status   • Glucose 03/13/2019 118* 70 - 100 mg/dL Final   • BUN 03/13/2019 46* 5 - 21 mg/dL Final   • Creatinine 03/13/2019 1.98* 0.50 - 1.40 mg/dL Final   • Sodium 03/13/2019 144  135 - 145 mmol/L Final   • Potassium 03/13/2019 4.4  3.5 - 5.3 mmol/L Final   • Chloride 03/13/2019 109  98 - 110 mmol/L Final   • CO2 03/13/2019 25.0  24.0 - 31.0 mmol/L Final   • Calcium 03/13/2019 8.7  8.4 - 10.4 mg/dL Final   • Total Protein 03/13/2019 7.0  6.3 - 8.7 g/dL Final   • Albumin 03/13/2019 4.00  3.50 - 5.00 g/dL Final   • ALT (SGPT) 03/13/2019 <15  0 - 54 U/L Final   • AST (SGOT) 03/13/2019 19  7 - 45 U/L Final   • Alkaline Phosphatase 03/13/2019 74  24 - 120 U/L Final   • Total Bilirubin 03/13/2019 0.3  0.1 - 1.0 mg/dL Final   • eGFR Non African Amer 03/13/2019 26* >60 mL/min/1.73 Final   • Globulin 03/13/2019 3.0  gm/dL Final   • A/G Ratio 03/13/2019 1.3  1.1 - 2.5 g/dL Final   • BUN/Creatinine Ratio 03/13/2019 23.2  7.0 - 25.0 Final   • Anion Gap 03/13/2019 10.0  4.0 - 13.0 mmol/L Final   • WBC 03/13/2019 9.89  4.80 - 10.80 10*3/mm3 Final   • RBC 03/13/2019 3.38* 4.20 - 5.40 10*6/mm3 Final   • Hemoglobin 03/13/2019 10.0* 12.0 - 16.0 g/dL Final   • Hematocrit 03/13/2019 31.8* 37.0 - 47.0 % Final   • MCV 03/13/2019 94.1  82.0 - 98.0 fL Final   • MCH 03/13/2019 29.6  28.0 - 32.0 pg Final   • MCHC 03/13/2019 31.4* 33.0 - 36.0 g/dL Final   • RDW 03/13/2019 16.8* 12.0 - 15.0 % Final   • RDW-SD 03/13/2019 58.1* 40.0 - 54.0 fl Final   • MPV 03/13/2019 10.7  6.0 - 12.0 fL Final   • Platelets 03/13/2019 224  130 - 400 10*3/mm3 Final   • Neutrophil % 03/13/2019 75.4  39.0 -  78.0 % Final   • Lymphocyte % 03/13/2019 16.3  15.0 - 45.0 % Final   • Monocyte % 03/13/2019 5.3  4.0 - 12.0 % Final   • Eosinophil % 03/13/2019 2.0  0.0 - 4.0 % Final   • Basophil % 03/13/2019 0.4  0.0 - 2.0 % Final   • Immature Grans % 03/13/2019 0.6  0.0 - 5.0 % Final   • Neutrophils, Absolute 03/13/2019 7.46  1.87 - 8.40 10*3/mm3 Final   • Lymphocytes, Absolute 03/13/2019 1.61  0.72 - 4.86 10*3/mm3 Final   • Monocytes, Absolute 03/13/2019 0.52  0.19 - 1.30 10*3/mm3 Final   • Eosinophils, Absolute 03/13/2019 0.20  0.00 - 0.70 10*3/mm3 Final   • Basophils, Absolute 03/13/2019 0.04  0.00 - 0.20 10*3/mm3 Final   • Immature Grans, Absolute 03/13/2019 0.06* 0.00 - 0.05 10*3/mm3 Final   • nRBC 03/13/2019 0.0  0.0 - 0.0 /100 WBC Final       RADIOLOGY:  No results found.         Assessment/Plan  Maria C Shrestha is a 58 y.o. year old female  Whom we are seeing for AoCKD on procrit that is stable.    Patient Active Problem List   Diagnosis   • Type 2 diabetes mellitus with stage 3 chronic kidney disease, with long-term current use of insulin (CMS/[facility])   • Mixed diabetic hyperlipidemia associated with type 2 diabetes mellitus (CMS/[facility])   • Hypertension associated with diabetes (CMS/[facility])   • Vitamin D deficiency   • B12 deficiency   • Anemia in CKD (chronic kidney disease)   • Acquired hypothyroidism   • Chronic kidney disease, stage 4 (severe) (CMS/[facility])          1. AoCKD: s/p iron infusions. Hg of 10  --curently on procrit monthly as needed    2. HTN: on amlodipine, carvedilol, lisinopril    3.DM: on insulin    4.Hypothyroidism: on synthroid  5. Anxiety/depression: on diazepam, celexa  6. Hyperlipidemia: on crestor.  7. CKD stage 4: sees renal. grf 26        Rodrigo Huitron MD    3/13/2019    1:12 PM

## 2019-03-14 ENCOUNTER — TELEPHONE (OUTPATIENT)
Dept: ENDOCRINOLOGY | Facility: CLINIC | Age: 59
End: 2019-03-14

## 2019-04-02 ENCOUNTER — TELEPHONE (OUTPATIENT)
Dept: FAMILY MEDICINE CLINIC | Facility: CLINIC | Age: 59
End: 2019-04-02

## 2019-04-02 RX ORDER — LEVOTHYROXINE SODIUM 0.05 MG/1
50 TABLET ORAL DAILY
Qty: 90 TABLET | Refills: 1 | Status: SHIPPED | OUTPATIENT
Start: 2019-04-02 | End: 2019-05-13

## 2019-04-02 NOTE — TELEPHONE ENCOUNTER
Patient needs 90 day supplies sent to [facility].  levothyroxine (SYNTHROID, LEVOTHROID) 50 MCG tablet

## 2019-04-10 ENCOUNTER — LAB (OUTPATIENT)
Dept: LAB | Facility: HOSPITAL | Age: 59
End: 2019-04-10

## 2019-04-10 ENCOUNTER — OFFICE VISIT (OUTPATIENT)
Dept: ONCOLOGY | Facility: CLINIC | Age: 59
End: 2019-04-10

## 2019-04-10 ENCOUNTER — INFUSION (OUTPATIENT)
Dept: ONCOLOGY | Facility: HOSPITAL | Age: 59
End: 2019-04-10

## 2019-04-10 VITALS
SYSTOLIC BLOOD PRESSURE: 152 MMHG | TEMPERATURE: 98.5 F | DIASTOLIC BLOOD PRESSURE: 68 MMHG | HEART RATE: 72 BPM | RESPIRATION RATE: 16 BRPM | BODY MASS INDEX: 48.82 KG/M2 | HEIGHT: 65 IN | WEIGHT: 293 LBS | OXYGEN SATURATION: 96 %

## 2019-04-10 DIAGNOSIS — D63.1 ANEMIA IN STAGE 3 CHRONIC KIDNEY DISEASE (HCC): ICD-10-CM

## 2019-04-10 DIAGNOSIS — N18.30 ANEMIA IN STAGE 3 CHRONIC KIDNEY DISEASE (HCC): ICD-10-CM

## 2019-04-10 LAB
ALBUMIN SERPL-MCNC: 4 G/DL (ref 3.5–5)
ALBUMIN/GLOB SERPL: 1.3 G/DL (ref 1.1–2.5)
ALP SERPL-CCNC: 72 U/L (ref 24–120)
ALT SERPL W P-5'-P-CCNC: <15 U/L (ref 0–54)
ANION GAP SERPL CALCULATED.3IONS-SCNC: 11 MMOL/L (ref 4–13)
AST SERPL-CCNC: 27 U/L (ref 7–45)
BASOPHILS # BLD AUTO: 0.02 10*3/MM3 (ref 0–0.2)
BASOPHILS NFR BLD AUTO: 0.2 % (ref 0–2)
BILIRUB SERPL-MCNC: 0.4 MG/DL (ref 0.1–1)
BUN BLD-MCNC: 45 MG/DL (ref 5–21)
BUN/CREAT SERPL: 22.5 (ref 7–25)
CALCIUM SPEC-SCNC: 9 MG/DL (ref 8.4–10.4)
CHLORIDE SERPL-SCNC: 105 MMOL/L (ref 98–110)
CO2 SERPL-SCNC: 25 MMOL/L (ref 24–31)
CREAT BLD-MCNC: 2 MG/DL (ref 0.5–1.4)
DEPRECATED RDW RBC AUTO: 55.2 FL (ref 40–54)
EOSINOPHIL # BLD AUTO: 0.21 10*3/MM3 (ref 0–0.7)
EOSINOPHIL NFR BLD AUTO: 1.9 % (ref 0–4)
ERYTHROCYTE [DISTWIDTH] IN BLOOD BY AUTOMATED COUNT: 16.4 % (ref 12–15)
GFR SERPL CREATININE-BSD FRML MDRD: 26 ML/MIN/1.73
GLOBULIN UR ELPH-MCNC: 3 GM/DL
GLUCOSE BLD-MCNC: 106 MG/DL (ref 70–100)
HCT VFR BLD AUTO: 32.2 % (ref 37–47)
HGB BLD-MCNC: 10.5 G/DL (ref 12–16)
HOLD SPECIMEN: NORMAL
HOLD SPECIMEN: NORMAL
IMM GRANULOCYTES # BLD AUTO: 0.04 10*3/MM3 (ref 0–0.05)
IMM GRANULOCYTES NFR BLD AUTO: 0.4 % (ref 0–5)
LYMPHOCYTES # BLD AUTO: 1.66 10*3/MM3 (ref 0.72–4.86)
LYMPHOCYTES NFR BLD AUTO: 15.2 % (ref 15–45)
MCH RBC QN AUTO: 29.6 PG (ref 28–32)
MCHC RBC AUTO-ENTMCNC: 32.6 G/DL (ref 33–36)
MCV RBC AUTO: 90.7 FL (ref 82–98)
MONOCYTES # BLD AUTO: 0.56 10*3/MM3 (ref 0.19–1.3)
MONOCYTES NFR BLD AUTO: 5.1 % (ref 4–12)
NEUTROPHILS # BLD AUTO: 8.45 10*3/MM3 (ref 1.87–8.4)
NEUTROPHILS NFR BLD AUTO: 77.2 % (ref 39–78)
NRBC BLD AUTO-RTO: 0 /100 WBC (ref 0–0)
PLATELET # BLD AUTO: 223 10*3/MM3 (ref 130–400)
PMV BLD AUTO: 10.2 FL (ref 6–12)
POTASSIUM BLD-SCNC: 4.8 MMOL/L (ref 3.5–5.3)
PROT SERPL-MCNC: 7 G/DL (ref 6.3–8.7)
RBC # BLD AUTO: 3.55 10*6/MM3 (ref 4.2–5.4)
SODIUM BLD-SCNC: 141 MMOL/L (ref 135–145)
WBC NRBC COR # BLD: 10.94 10*3/MM3 (ref 4.8–10.8)

## 2019-04-10 PROCEDURE — 36415 COLL VENOUS BLD VENIPUNCTURE: CPT

## 2019-04-10 PROCEDURE — 80053 COMPREHEN METABOLIC PANEL: CPT

## 2019-04-10 PROCEDURE — 85025 COMPLETE CBC W/AUTO DIFF WBC: CPT

## 2019-04-10 PROCEDURE — 99213 OFFICE O/P EST LOW 20 MIN: CPT | Performed by: INTERNAL MEDICINE

## 2019-04-10 NOTE — PROGRESS NOTES
[facility]  HEMATOLOGY & ONCOLOGY    Cancer Staging Information:  Cancer Staging  No matching staging information was found for the patient.      Subjective     VISIT DIAGNOSIS:   Encounter Diagnosis   Name Primary?   • Anemia in stage 3 chronic kidney disease (CMS/HCC)        REASON FOR VISIT:     Chief Complaint   Patient presents with   • Anemia     Here for conisderation of Procrit        HEMATOLOGY / ONCOLOGY HISTORY:    No history exists.           INTERVAL HISTORY  Patient ID: Maria C Shrestha is a 59 y.o. year old female Cancer Staging  3/13/19: no issues or concerns. Follows up with renal.    4/10/19: only complain is leg swelling with the heat. Denies sob,cp,n/v/dizziness.occassional blood streaked stool from hemorrhoids.  --rest of ros unremarkable.    Past Medical History:   Past Medical History:   Diagnosis Date   • Acquired hypothyroidism 2/6/2017   • Allergic    • Anemia    • Anxiety    • Arthritis    • Cellulitis    • Chronic kidney disease     3rd stage   • Depression    • Disease of thyroid gland    • Dyslipidemia    • Elevated cholesterol    • Essential hypertension    • Fatigue    • Gallbladder abscess    • Hearing loss    • Light headed    • Limited range of motion (ROM) of shoulder     right   • Obesity    • Palpitation    • Short of breath on exertion    • Sleep apnea    • Type 2 diabetes mellitus (CMS/HCC)    • Type II diabetes mellitus, uncontrolled (CMS/HCC)    • Wears glasses      Past Surgical History:   Past Surgical History:   Procedure Laterality Date   • ADENOIDECTOMY     • ANAL FISTULA REPAIR      x 2    • CHOLECYSTECTOMY     • COLONOSCOPY  01/02/2014   • COLONOSCOPY N/A 3/22/2017    Procedure: COLONOSCOPY WITH ANESTHESIA;  Surgeon: Mono Linder MD;  Location: [facility] ENDOSCOPY;  Service:    • D&C HYSTEROSCOPY N/A 7/25/2018    Procedure: DILATATION AND CURETTAGE HYSTEROSCOPY;  Surgeon: Michael Castaneda MD;  Location: [facility] OR;  Service: Obstetrics/Gynecology    • DILATATION AND CURETTAGE      X 2   • ENDOSCOPY     • TONSILLECTOMY       Social History:   Social History     Socioeconomic History   • Marital status:      Spouse name: Not on file   • Number of children: Not on file   • Years of education: Not on file   • Highest education level: Not on file   Tobacco Use   • Smoking status: Never Smoker   • Smokeless tobacco: Never Used   Substance and Sexual Activity   • Alcohol use: No   • Drug use: No   • Sexual activity: No     Birth control/protection: Post-menopausal     Family History:   Family History   Problem Relation Age of Onset   • Diabetes Other    • Cancer Mother    • Cancer Father    • Heart disease Father    • Diabetes Father    • Obesity Father    • Stroke Father    • Cancer Maternal Grandmother    • Uterine cancer Maternal Grandmother    • Diabetes Maternal Grandfather    • Cancer Paternal Grandmother    • Colon cancer Paternal Grandmother    • Diabetes Paternal Grandfather    • Heart disease Paternal Grandfather    • No Known Problems Sister    • No Known Problems Brother    • No Known Problems Daughter    • No Known Problems Son    • No Known Problems Maternal Aunt    • No Known Problems Paternal Aunt    • BRCA 1/2 Neg Hx    • Breast cancer Neg Hx    • Endometrial cancer Neg Hx    • Ovarian cancer Neg Hx        Review of Systems   See HPI otherwise unremarkable.  Performance Status:  Asymptomatic    Medications:    Current Outpatient Medications   Medication Sig Dispense Refill   • allopurinol (ZYLOPRIM) 100 MG tablet Take 100 mg by mouth 2 (Two) Times a Day.     • amLODIPine (NORVASC) 5 MG tablet      • aspirin 81 MG tablet Take 81 mg by mouth Daily. Stop 7/22/2018     • busPIRone (BUSPAR) 10 MG tablet buspirone 10 mg tablet   Take 2 tablets twice a day by oral route as needed for 90 days.     • carvedilol (COREG) 3.125 MG tablet Take 3.125 mg by mouth 2 (Two) Times a Day.     • cetirizine (zyrTEC) 10 MG tablet Take 10 mg by mouth As Needed.    "  • Cholecalciferol (VITAMIN D3) 21770 units tablet Vitamin D2 50,000 unit capsule   Take 1 capsule every week by oral route. Sunday     • cinacalcet (SENSIPAR) 30 MG tablet Take 1 tablet by mouth Daily. 30 tablet 11   • citalopram (CeleXA) 20 MG tablet Take 20 mg by mouth Daily.     • diazePAM (VALIUM) 2 MG tablet Take 2 mg by mouth As Needed for Anxiety.     • Dulaglutide (TRULICITY) 1.5 MG/0.5ML solution pen-injector Inject 1.5 mg under the skin into the appropriate area as directed Every 7 (Seven) Days. 6 mL 3   • fluticasone (VERAMYST) 27.5 MCG/SPRAY nasal spray 2 sprays into each nostril Daily.     • Glucose Blood (BLOOD GLUCOSE TEST) strip Use 4 x daily, use any brand covered by insurance or same brand as before 360 each 3   • ibuprofen (ADVIL,MOTRIN) 800 MG tablet Take 800 mg by mouth Every 8 (Eight) Hours As Needed for Mild Pain .     • insulin glargine (LANTUS) 100 UNIT/ML injection 200 units daily (Patient taking differently: Inject 180 Units under the skin Every Night. 200 units daily) 18 each 3   • Insulin Lispro (HUMALOG KWIKPEN) 200 UNIT/ML solution pen-injector Inject 30 Units under the skin into the appropriate area as directed 3 (Three) Times a Day With Meals. 5 pen 11   • Insulin Pen Needle (PEN NEEDLES) 31G X 6 MM misc Use to inject insulin 4 times daily. 200 each 11   • Insulin Syringe 31G X 5/16\" 1 ML misc 4 x daily 120 each 11   • lamoTRIgine (LaMICtal) 50 MG tablet dispersible disintegrating tablet Take 50 mg by mouth Every Night.     • Lancets (FREESTYLE) lancets FOUR TIMES A  each 11   • levothyroxine (SYNTHROID, LEVOTHROID) 50 MCG tablet Take 1 tablet by mouth Daily. 90 tablet 1   • lisinopril (PRINIVIL,ZESTRIL) 20 MG tablet Take 20 mg by mouth 2 (Two) Times a Day.     • Multiple Vitamins-Minerals (MULTIVITAMIN ADULT PO) Take 1 tablet by mouth Daily.     • potassium gluconate 595 (99 K) MG tablet tablet potassium gluconate 595 mg (99 mg) tablet   Take 2 tablets every day by oral " "route.     • rosuvastatin (CRESTOR) 10 MG tablet Take 10 mg by mouth Daily.     • sodium bicarbonate 650 MG tablet Take 650 mg by mouth 3 (Three) Times a Day.     • traMADol (ULTRAM) 50 MG tablet Take 1 tablet by mouth Every 6 (Six) Hours As Needed for Moderate Pain . 8 tablet 0     No current facility-administered medications for this visit.        ALLERGIES:    Allergies   Allergen Reactions   • Codeine Nausea Only       Objective      Vitals:    04/10/19 1332   BP: 152/68   Pulse: 72   Resp: 16   Temp: 98.5 °F (36.9 °C)   TempSrc: Oral   SpO2: 96%   Weight: (!) 172 kg (378 lb 14.4 oz)   Height: 165.1 cm (65\")   PainSc: 0-No pain         Current Status 3/13/2019   ECOG score 1         Physical Exam  General Appearance:obese female.  Patient is awake, alert, oriented and in no acute distress. Patient is welldeveloped, wellnourished, and appears stated age.  HEENT: Normocephalic. Sclerae clear, conjunctiva pink, extraocular movements intact, pupils, round, reactive to light and  accommodation. Mouth and throat are clear with moist oral mucosa.  NECK: Supple, no jugular venous distention, thyroid not enlarged.  LYMPH: No cervical, supraclavicular, axillary, or inguinal lymphadenopathy.  CHEST: Equal bilateral expansion, AP  diameter normal, resonant percussion note  LUNGS: Good air movement, no rales, rhonchi, rubs or wheezes with auscultation  CARDIO: Regular sinus rhythm, no murmurs, gallops or rubs.  ABDOMEN: obese abdomen. Nondistended, soft, No tenderness, no guarding, no rebound, No hepatosplenomegaly. No abdominal masses. Bowel sounds positive. No hernia  GENITALIA: Not examined.  BREASTS: Not examined.  MUSKEL: No joint swelling, decreased motion, or inflammation  EXTREMS: kristie YAZAN. No clubbing, cyanosis, No varicose veins.  NEURO: Grossly nonfocal, Gait is coordinated and smooth, Cognition is preserved.  SKIN: No rashes, no ecchymoses, no petechia.  PSYCH: Oriented to time, place and person. Memory is " preserved. Mood and affect appear normal  RECENT LABS:  Lab on 04/10/2019   Component Date Value Ref Range Status   • WBC 04/10/2019 10.94* 4.80 - 10.80 10*3/mm3 Final   • RBC 04/10/2019 3.55* 4.20 - 5.40 10*6/mm3 Final   • Hemoglobin 04/10/2019 10.5* 12.0 - 16.0 g/dL Final   • Hematocrit 04/10/2019 32.2* 37.0 - 47.0 % Final   • MCV 04/10/2019 90.7  82.0 - 98.0 fL Final   • MCH 04/10/2019 29.6  28.0 - 32.0 pg Final   • MCHC 04/10/2019 32.6* 33.0 - 36.0 g/dL Final   • RDW 04/10/2019 16.4* 12.0 - 15.0 % Final   • RDW-SD 04/10/2019 55.2* 40.0 - 54.0 fl Final   • MPV 04/10/2019 10.2  6.0 - 12.0 fL Final   • Platelets 04/10/2019 223  130 - 400 10*3/mm3 Final   • Neutrophil % 04/10/2019 77.2  39.0 - 78.0 % Final   • Lymphocyte % 04/10/2019 15.2  15.0 - 45.0 % Final   • Monocyte % 04/10/2019 5.1  4.0 - 12.0 % Final   • Eosinophil % 04/10/2019 1.9  0.0 - 4.0 % Final   • Basophil % 04/10/2019 0.2  0.0 - 2.0 % Final   • Immature Grans % 04/10/2019 0.4  0.0 - 5.0 % Final   • Neutrophils, Absolute 04/10/2019 8.45* 1.87 - 8.40 10*3/mm3 Final   • Lymphocytes, Absolute 04/10/2019 1.66  0.72 - 4.86 10*3/mm3 Final   • Monocytes, Absolute 04/10/2019 0.56  0.19 - 1.30 10*3/mm3 Final   • Eosinophils, Absolute 04/10/2019 0.21  0.00 - 0.70 10*3/mm3 Final   • Basophils, Absolute 04/10/2019 0.02  0.00 - 0.20 10*3/mm3 Final   • Immature Grans, Absolute 04/10/2019 0.04  0.00 - 0.05 10*3/mm3 Final   • nRBC 04/10/2019 0.0  0.0 - 0.0 /100 WBC Final       RADIOLOGY:  No results found.         Assessment/Plan  Maria C Shrestha is a 59 y.o. year old female  Whom we are seeing for AoCKD on procrit that is stable.    Patient Active Problem List   Diagnosis   • Type 2 diabetes mellitus with stage 3 chronic kidney disease, with long-term current use of insulin (CMS/HCC)   • Mixed diabetic hyperlipidemia associated with type 2 diabetes mellitus (CMS/HCC)   • Hypertension associated with diabetes (CMS/HCC)   • Vitamin D deficiency   • B12 deficiency   •  Anemia in CKD (chronic kidney disease)   • Acquired hypothyroidism   • Chronic kidney disease, stage 4 (severe) (CMS/HCC)          1. AoCKD: s/p iron infusions. Hg of 10.5. No shot today  --curently on procrit monthly as needed    2. HTN: on amlodipine, carvedilol, lisinopril    3.DM: on insulin    4.Hypothyroidism: on synthroid  5. Anxiety/depression: on diazepam, celexa  6. Hyperlipidemia: on crestor.  7. CKD stage 4: sees renal. grf 26        Rodrigo Huitron MD    4/10/2019    1:52 PM

## 2019-04-26 RX ORDER — INSULIN GLARGINE 100 [IU]/ML
INJECTION, SOLUTION SUBCUTANEOUS
Qty: 180 ML | Refills: 3 | Status: SHIPPED | OUTPATIENT
Start: 2019-04-26 | End: 2020-05-12 | Stop reason: SDUPTHER

## 2019-05-06 ENCOUNTER — APPOINTMENT (OUTPATIENT)
Dept: LAB | Facility: HOSPITAL | Age: 59
End: 2019-05-06

## 2019-05-06 LAB
25(OH)D3 SERPL-MCNC: 32.9 NG/ML (ref 30–100)
ALBUMIN SERPL-MCNC: 3.7 G/DL (ref 3.5–5)
ALBUMIN/GLOB SERPL: 1.1 G/DL (ref 1.1–2.5)
ALP SERPL-CCNC: 81 U/L (ref 24–120)
ALT SERPL W P-5'-P-CCNC: 15 U/L (ref 0–54)
ANION GAP SERPL CALCULATED.3IONS-SCNC: 9 MMOL/L (ref 4–13)
AST SERPL-CCNC: 18 U/L (ref 7–45)
AUTO MIXED CELLS #: 0.6 10*3/MM3 (ref 0.1–2.6)
AUTO MIXED CELLS %: 6.5 % (ref 0.1–24)
BILIRUB SERPL-MCNC: 0.1 MG/DL (ref 0.1–1)
BUN BLD-MCNC: 39 MG/DL (ref 5–21)
BUN/CREAT SERPL: 19.8
CALCIUM SPEC-SCNC: 8.3 MG/DL (ref 8.4–10.4)
CHLORIDE SERPL-SCNC: 108 MMOL/L (ref 98–110)
CHOLEST SERPL-MCNC: 165 MG/DL (ref 130–200)
CO2 SERPL-SCNC: 25 MMOL/L (ref 24–31)
CREAT BLD-MCNC: 1.97 MG/DL (ref 0.5–1.4)
ERYTHROCYTE [DISTWIDTH] IN BLOOD BY AUTOMATED COUNT: 16.6 % (ref 12–15)
GFR SERPL CREATININE-BSD FRML MDRD: 26 ML/MIN/1.73
GLOBULIN UR ELPH-MCNC: 3.4 GM/DL
GLUCOSE BLD-MCNC: 168 MG/DL (ref 70–100)
HBA1C MFR BLD: 6.9 %
HCT VFR BLD AUTO: 31.9 % (ref 37–47)
HDLC SERPL-MCNC: 37 MG/DL
HGB BLD-MCNC: 10.3 G/DL (ref 12–16)
IRON 24H UR-MRATE: 68 MCG/DL (ref 42–180)
IRON SATN MFR SERPL: 24 % (ref 20–45)
LDLC SERPL CALC-MCNC: 73 MG/DL (ref 0–99)
LDLC/HDLC SERPL: 1.98 {RATIO}
LYMPHOCYTES # BLD AUTO: 1.5 10*3/MM3 (ref 0.7–3.1)
LYMPHOCYTES NFR BLD AUTO: 15.8 % (ref 15–45)
MCH RBC QN AUTO: 29 PG (ref 28–32)
MCHC RBC AUTO-ENTMCNC: 32.3 G/DL (ref 33–36)
MCV RBC AUTO: 89.9 FL (ref 82–98)
NEUTROPHILS # BLD AUTO: 7.6 10*3/MM3 (ref 1.5–8.3)
NEUTROPHILS NFR BLD AUTO: 77.7 % (ref 39–78)
PLATELET # BLD AUTO: 196 10*3/MM3 (ref 130–400)
PMV BLD AUTO: 9.1 FL (ref 6–12)
POTASSIUM BLD-SCNC: 4 MMOL/L (ref 3.5–5.3)
PROT SERPL-MCNC: 7.1 G/DL (ref 6.3–8.7)
RBC # BLD AUTO: 3.55 10*6/MM3 (ref 4.2–5.4)
SODIUM BLD-SCNC: 142 MMOL/L (ref 135–145)
TIBC SERPL-MCNC: 286 MCG/DL (ref 225–420)
TRIGL SERPL-MCNC: 274 MG/DL (ref 0–149)
TSH SERPL DL<=0.05 MIU/L-ACNC: 4.72 MIU/ML (ref 0.47–4.68)
VIT B12 BLD-MCNC: 721 PG/ML (ref 239–931)
VLDLC SERPL-MCNC: 54.8 MG/DL
WBC NRBC COR # BLD: 9.7 10*3/MM3 (ref 4.8–10.8)

## 2019-05-06 PROCEDURE — 80061 LIPID PANEL: CPT | Performed by: INTERNAL MEDICINE

## 2019-05-06 PROCEDURE — 83036 HEMOGLOBIN GLYCOSYLATED A1C: CPT | Performed by: INTERNAL MEDICINE

## 2019-05-06 PROCEDURE — 82043 UR ALBUMIN QUANTITATIVE: CPT | Performed by: INTERNAL MEDICINE

## 2019-05-06 PROCEDURE — 83550 IRON BINDING TEST: CPT | Performed by: INTERNAL MEDICINE

## 2019-05-06 PROCEDURE — 85025 COMPLETE CBC W/AUTO DIFF WBC: CPT | Performed by: INTERNAL MEDICINE

## 2019-05-06 PROCEDURE — 84443 ASSAY THYROID STIM HORMONE: CPT | Performed by: INTERNAL MEDICINE

## 2019-05-06 PROCEDURE — 82306 VITAMIN D 25 HYDROXY: CPT | Performed by: INTERNAL MEDICINE

## 2019-05-06 PROCEDURE — 82607 VITAMIN B-12: CPT | Performed by: INTERNAL MEDICINE

## 2019-05-06 PROCEDURE — 82570 ASSAY OF URINE CREATININE: CPT | Performed by: INTERNAL MEDICINE

## 2019-05-06 PROCEDURE — 36415 COLL VENOUS BLD VENIPUNCTURE: CPT | Performed by: INTERNAL MEDICINE

## 2019-05-06 PROCEDURE — 83540 ASSAY OF IRON: CPT | Performed by: INTERNAL MEDICINE

## 2019-05-06 PROCEDURE — 80053 COMPREHEN METABOLIC PANEL: CPT | Performed by: INTERNAL MEDICINE

## 2019-05-07 LAB
CREAT 24H UR-MCNC: 64.7 MG/DL
MICROALBUMIN UR-MCNC: 1638 UG/ML
MICROALBUMIN/CREAT UR: 2531.7 MG/G CREAT (ref 0–30)

## 2019-05-08 ENCOUNTER — OFFICE VISIT (OUTPATIENT)
Dept: ONCOLOGY | Facility: CLINIC | Age: 59
End: 2019-05-08

## 2019-05-08 ENCOUNTER — INFUSION (OUTPATIENT)
Dept: ONCOLOGY | Facility: HOSPITAL | Age: 59
End: 2019-05-08

## 2019-05-08 ENCOUNTER — LAB (OUTPATIENT)
Dept: LAB | Facility: HOSPITAL | Age: 59
End: 2019-05-08

## 2019-05-08 VITALS
WEIGHT: 293 LBS | OXYGEN SATURATION: 93 % | SYSTOLIC BLOOD PRESSURE: 140 MMHG | BODY MASS INDEX: 48.82 KG/M2 | TEMPERATURE: 98.8 F | DIASTOLIC BLOOD PRESSURE: 70 MMHG | HEART RATE: 85 BPM | RESPIRATION RATE: 18 BRPM | HEIGHT: 65 IN

## 2019-05-08 VITALS
HEART RATE: 71 BPM | BODY MASS INDEX: 48.82 KG/M2 | OXYGEN SATURATION: 99 % | SYSTOLIC BLOOD PRESSURE: 199 MMHG | HEIGHT: 65 IN | DIASTOLIC BLOOD PRESSURE: 63 MMHG | WEIGHT: 293 LBS | RESPIRATION RATE: 20 BRPM | TEMPERATURE: 99 F

## 2019-05-08 DIAGNOSIS — D63.1 ANEMIA IN STAGE 3 CHRONIC KIDNEY DISEASE (HCC): ICD-10-CM

## 2019-05-08 DIAGNOSIS — D63.1 ANEMIA IN STAGE 3 CHRONIC KIDNEY DISEASE (HCC): Primary | ICD-10-CM

## 2019-05-08 DIAGNOSIS — N18.30 ANEMIA IN STAGE 3 CHRONIC KIDNEY DISEASE (HCC): Primary | ICD-10-CM

## 2019-05-08 DIAGNOSIS — N18.30 ANEMIA IN STAGE 3 CHRONIC KIDNEY DISEASE (HCC): ICD-10-CM

## 2019-05-08 LAB
ALBUMIN SERPL-MCNC: 3.7 G/DL (ref 3.5–5)
ALBUMIN/GLOB SERPL: 1.2 G/DL (ref 1.1–2.5)
ALP SERPL-CCNC: 71 U/L (ref 24–120)
ALT SERPL W P-5'-P-CCNC: <15 U/L (ref 0–54)
ANION GAP SERPL CALCULATED.3IONS-SCNC: 7 MMOL/L (ref 4–13)
AST SERPL-CCNC: 21 U/L (ref 7–45)
BASOPHILS # BLD AUTO: 0.02 10*3/MM3 (ref 0–0.2)
BASOPHILS NFR BLD AUTO: 0.2 % (ref 0–2)
BILIRUB SERPL-MCNC: 0.2 MG/DL (ref 0.1–1)
BUN BLD-MCNC: 40 MG/DL (ref 5–21)
BUN/CREAT SERPL: 21.9 (ref 7–25)
CALCIUM SPEC-SCNC: 9.1 MG/DL (ref 8.4–10.4)
CHLORIDE SERPL-SCNC: 109 MMOL/L (ref 98–110)
CO2 SERPL-SCNC: 25 MMOL/L (ref 24–31)
CREAT BLD-MCNC: 1.83 MG/DL (ref 0.5–1.4)
DEPRECATED RDW RBC AUTO: 55.9 FL (ref 40–54)
EOSINOPHIL # BLD AUTO: 0.24 10*3/MM3 (ref 0–0.7)
EOSINOPHIL NFR BLD AUTO: 2.5 % (ref 0–4)
ERYTHROCYTE [DISTWIDTH] IN BLOOD BY AUTOMATED COUNT: 16.8 % (ref 12–15)
GFR SERPL CREATININE-BSD FRML MDRD: 28 ML/MIN/1.73
GLOBULIN UR ELPH-MCNC: 3.2 GM/DL
GLUCOSE BLD-MCNC: 157 MG/DL (ref 70–100)
HCT VFR BLD AUTO: 29.5 % (ref 37–47)
HGB BLD-MCNC: 9.4 G/DL (ref 12–16)
HOLD SPECIMEN: NORMAL
HOLD SPECIMEN: NORMAL
IMM GRANULOCYTES # BLD AUTO: 0.04 10*3/MM3 (ref 0–0.05)
IMM GRANULOCYTES NFR BLD AUTO: 0.4 % (ref 0–5)
LYMPHOCYTES # BLD AUTO: 1.78 10*3/MM3 (ref 0.72–4.86)
LYMPHOCYTES NFR BLD AUTO: 18.4 % (ref 15–45)
MCH RBC QN AUTO: 28.9 PG (ref 28–32)
MCHC RBC AUTO-ENTMCNC: 31.9 G/DL (ref 33–36)
MCV RBC AUTO: 90.8 FL (ref 82–98)
MONOCYTES # BLD AUTO: 0.44 10*3/MM3 (ref 0.19–1.3)
MONOCYTES NFR BLD AUTO: 4.6 % (ref 4–12)
NEUTROPHILS # BLD AUTO: 7.13 10*3/MM3 (ref 1.87–8.4)
NEUTROPHILS NFR BLD AUTO: 73.9 % (ref 39–78)
NRBC BLD AUTO-RTO: 0 /100 WBC (ref 0–0.2)
PLATELET # BLD AUTO: 192 10*3/MM3 (ref 130–400)
PMV BLD AUTO: 10.5 FL (ref 6–12)
POTASSIUM BLD-SCNC: 4.4 MMOL/L (ref 3.5–5.3)
PROT SERPL-MCNC: 6.9 G/DL (ref 6.3–8.7)
RBC # BLD AUTO: 3.25 10*6/MM3 (ref 4.2–5.4)
SODIUM BLD-SCNC: 141 MMOL/L (ref 135–145)
WBC NRBC COR # BLD: 9.65 10*3/MM3 (ref 4.8–10.8)

## 2019-05-08 PROCEDURE — 25010000002 EPOETIN ALFA PER 1000 UNITS: Performed by: INTERNAL MEDICINE

## 2019-05-08 PROCEDURE — 96372 THER/PROPH/DIAG INJ SC/IM: CPT

## 2019-05-08 PROCEDURE — 85025 COMPLETE CBC W/AUTO DIFF WBC: CPT | Performed by: INTERNAL MEDICINE

## 2019-05-08 PROCEDURE — 80053 COMPREHEN METABOLIC PANEL: CPT | Performed by: INTERNAL MEDICINE

## 2019-05-08 PROCEDURE — 99214 OFFICE O/P EST MOD 30 MIN: CPT | Performed by: INTERNAL MEDICINE

## 2019-05-08 PROCEDURE — 36415 COLL VENOUS BLD VENIPUNCTURE: CPT

## 2019-05-08 RX ADMIN — ERYTHROPOIETIN 40000 UNITS: 40000 INJECTION, SOLUTION INTRAVENOUS; SUBCUTANEOUS at 13:46

## 2019-05-08 NOTE — PROGRESS NOTES
[facility]  HEMATOLOGY & ONCOLOGY    Cancer Staging Information:  Cancer Staging  No matching staging information was found for the patient.      Subjective     VISIT DIAGNOSIS:   Encounter Diagnosis   Name Primary?   • Anemia in stage 3 chronic kidney disease (CMS/HCC)        REASON FOR VISIT:     Chief Complaint   Patient presents with   • Anemia     She is here for 1 mo f/u visit today to review her lab work   • DEPRESSION SCREENING     Initial Depression Screening (see chart)        HEMATOLOGY / ONCOLOGY HISTORY:    No history exists.           INTERVAL HISTORY  Patient ID: Maria C Shrestha is a 59 y.o. year old female Cancer Staging  3/13/19: no issues or concerns. Follows up with renal.    5/8/19: only complain is leg swelling with the heat. Denies sob,cp,n/v/dizziness.occassional blood streaked stool from hemorrhoids.  --rest of ros unremarkable.    Past Medical History:   Past Medical History:   Diagnosis Date   • Acquired hypothyroidism 2/6/2017   • Allergic    • Anemia    • Anxiety    • Arthritis    • Cellulitis    • Chronic kidney disease     3rd stage   • Depression    • Disease of thyroid gland    • Dyslipidemia    • Elevated cholesterol    • Essential hypertension    • Fatigue    • Gallbladder abscess    • Hearing loss    • Light headed    • Limited range of motion (ROM) of shoulder     right   • Obesity    • Palpitation    • Short of breath on exertion    • Sleep apnea    • Type 2 diabetes mellitus (CMS/HCC)    • Type II diabetes mellitus, uncontrolled (CMS/HCC)    • Wears glasses      Past Surgical History:   Past Surgical History:   Procedure Laterality Date   • ADENOIDECTOMY     • ANAL FISTULA REPAIR      x 2    • CHOLECYSTECTOMY     • COLONOSCOPY  01/02/2014   • COLONOSCOPY N/A 3/22/2017    Procedure: COLONOSCOPY WITH ANESTHESIA;  Surgeon: Mono Linder MD;  Location: [facility] ENDOSCOPY;  Service:    • D&C HYSTEROSCOPY N/A 7/25/2018    Procedure: DILATATION AND CURETTAGE  HYSTEROSCOPY;  Surgeon: Michael Castaneda MD;  Location: [facility];  Service: Obstetrics/Gynecology   • DILATATION AND CURETTAGE      X 2   • ENDOSCOPY     • TONSILLECTOMY       Social History:   Social History     Socioeconomic History   • Marital status:      Spouse name: Not on file   • Number of children: Not on file   • Years of education: Not on file   • Highest education level: Not on file   Tobacco Use   • Smoking status: Never Smoker   • Smokeless tobacco: Never Used   Substance and Sexual Activity   • Alcohol use: No   • Drug use: No   • Sexual activity: No     Birth control/protection: Post-menopausal     Family History:   Family History   Problem Relation Age of Onset   • Diabetes Other    • Cancer Mother    • Cancer Father    • Heart disease Father    • Diabetes Father    • Obesity Father    • Stroke Father    • Cancer Maternal Grandmother    • Uterine cancer Maternal Grandmother    • Diabetes Maternal Grandfather    • Cancer Paternal Grandmother    • Colon cancer Paternal Grandmother    • Diabetes Paternal Grandfather    • Heart disease Paternal Grandfather    • No Known Problems Sister    • No Known Problems Brother    • No Known Problems Daughter    • No Known Problems Son    • No Known Problems Maternal Aunt    • No Known Problems Paternal Aunt    • BRCA 1/2 Neg Hx    • Breast cancer Neg Hx    • Endometrial cancer Neg Hx    • Ovarian cancer Neg Hx        Review of Systems   See HPI otherwise unremarkable.  Performance Status:  Asymptomatic    Medications:    Current Outpatient Medications   Medication Sig Dispense Refill   • allopurinol (ZYLOPRIM) 100 MG tablet Take 100 mg by mouth 2 (Two) Times a Day.     • amLODIPine (NORVASC) 5 MG tablet      • aspirin 81 MG tablet Take 81 mg by mouth Daily. Stop 7/22/2018     • busPIRone (BUSPAR) 10 MG tablet buspirone 10 mg tablet   Take 2 tablets twice a day by oral route as needed for 90 days.     • carvedilol (COREG) 3.125 MG tablet Take 3.125 mg by  "mouth 2 (Two) Times a Day.     • cetirizine (zyrTEC) 10 MG tablet Take 10 mg by mouth As Needed.     • Cholecalciferol (VITAMIN D3) 57489 units tablet Vitamin D2 50,000 unit capsule   Take 1 capsule every week by oral route. Sunday     • cinacalcet (SENSIPAR) 30 MG tablet Take 1 tablet by mouth Daily. 30 tablet 11   • citalopram (CeleXA) 20 MG tablet Take 20 mg by mouth Daily.     • diazePAM (VALIUM) 2 MG tablet Take 2 mg by mouth As Needed for Anxiety.     • Dulaglutide (TRULICITY) 1.5 MG/0.5ML solution pen-injector Inject 1.5 mg under the skin into the appropriate area as directed Every 7 (Seven) Days. 6 mL 3   • fluticasone (VERAMYST) 27.5 MCG/SPRAY nasal spray 2 sprays into each nostril Daily.     • Glucose Blood (BLOOD GLUCOSE TEST) strip Use 4 x daily, use any brand covered by insurance or same brand as before 360 each 3   • ibuprofen (ADVIL,MOTRIN) 800 MG tablet Take 800 mg by mouth Every 8 (Eight) Hours As Needed for Mild Pain .     • insulin glargine (LANTUS) 100 UNIT/ML injection INJECT 200 UNITS DAILY 180 mL 3   • Insulin Lispro (HUMALOG KWIKPEN) 200 UNIT/ML solution pen-injector Inject 30 Units under the skin into the appropriate area as directed 3 (Three) Times a Day With Meals. 5 pen 11   • Insulin Pen Needle (PEN NEEDLES) 31G X 6 MM misc Use to inject insulin 4 times daily. 200 each 11   • Insulin Syringe 31G X 5/16\" 1 ML misc 4 x daily 120 each 11   • lamoTRIgine (LaMICtal) 50 MG tablet dispersible disintegrating tablet Take 50 mg by mouth Every Night.     • Lancets (FREESTYLE) lancets FOUR TIMES A  each 11   • levothyroxine (SYNTHROID, LEVOTHROID) 50 MCG tablet Take 1 tablet by mouth Daily. 90 tablet 1   • lisinopril (PRINIVIL,ZESTRIL) 20 MG tablet Take 20 mg by mouth 2 (Two) Times a Day.     • Multiple Vitamins-Minerals (MULTIVITAMIN ADULT PO) Take 1 tablet by mouth Daily.     • potassium gluconate 595 (99 K) MG tablet tablet potassium gluconate 595 mg (99 mg) tablet   Take 2 tablets every " "day by oral route.     • rosuvastatin (CRESTOR) 10 MG tablet Take 10 mg by mouth Daily.     • sodium bicarbonate 650 MG tablet Take 650 mg by mouth 3 (Three) Times a Day.     • traMADol (ULTRAM) 50 MG tablet Take 1 tablet by mouth Every 6 (Six) Hours As Needed for Moderate Pain . 8 tablet 0     No current facility-administered medications for this visit.        ALLERGIES:    Allergies   Allergen Reactions   • Codeine Nausea Only       Objective      Vitals:    05/08/19 1312   BP: 140/70   Pulse: 85   Resp: 18   Temp: 98.8 °F (37.1 °C)   TempSrc: Tympanic   SpO2: 93%   Weight: (!) 175 kg (385 lb 6.4 oz)   Height: 165.1 cm (65\")   PainSc: 0-No pain         Current Status 5/8/2019   ECOG score 2         Physical Examremains the same  General Appearance:obese female.  Patient is awake, alert, oriented and in no acute distress. Patient is welldeveloped, wellnourished, and appears stated age.  HEENT: Normocephalic. Sclerae clear, conjunctiva pink, extraocular movements intact, pupils, round, reactive to light and  accommodation. Mouth and throat are clear with moist oral mucosa.  NECK: Supple, no jugular venous distention, thyroid not enlarged.  LYMPH: No cervical, supraclavicular, axillary, or inguinal lymphadenopathy.  CHEST: Equal bilateral expansion, AP  diameter normal, resonant percussion note  LUNGS: Good air movement, no rales, rhonchi, rubs or wheezes with auscultation  CARDIO: Regular sinus rhythm, no murmurs, gallops or rubs.  ABDOMEN: obese abdomen. Nondistended, soft, No tenderness, no guarding, no rebound, No hepatosplenomegaly. No abdominal masses. Bowel sounds positive. No hernia  GENITALIA: Not examined.  BREASTS: Not examined.  MUSKEL: No joint swelling, decreased motion, or inflammation  EXTREMS: kristie YAZAN. No clubbing, cyanosis, No varicose veins.  NEURO: Grossly nonfocal, Gait is coordinated and smooth, Cognition is preserved.  SKIN: No rashes, no ecchymoses, no petechia.  PSYCH: Oriented to time, place " and person. Memory is preserved. Mood and affect appear normal  RECENT LABS:  Lab on 05/08/2019   Component Date Value Ref Range Status   • Glucose 05/08/2019 157* 70 - 100 mg/dL Final   • BUN 05/08/2019 40* 5 - 21 mg/dL Final   • Creatinine 05/08/2019 1.83* 0.50 - 1.40 mg/dL Final   • Sodium 05/08/2019 141  135 - 145 mmol/L Final   • Potassium 05/08/2019 4.4  3.5 - 5.3 mmol/L Final   • Chloride 05/08/2019 109  98 - 110 mmol/L Final   • CO2 05/08/2019 25.0  24.0 - 31.0 mmol/L Final   • Calcium 05/08/2019 9.1  8.4 - 10.4 mg/dL Final   • Total Protein 05/08/2019 6.9  6.3 - 8.7 g/dL Final   • Albumin 05/08/2019 3.70  3.50 - 5.00 g/dL Final   • ALT (SGPT) 05/08/2019 <15  0 - 54 U/L Final   • AST (SGOT) 05/08/2019 21  7 - 45 U/L Final   • Alkaline Phosphatase 05/08/2019 71  24 - 120 U/L Final   • Total Bilirubin 05/08/2019 0.2  0.1 - 1.0 mg/dL Final   • eGFR Non African Amer 05/08/2019 28* >60 mL/min/1.73 Final   • Globulin 05/08/2019 3.2  gm/dL Final   • A/G Ratio 05/08/2019 1.2  1.1 - 2.5 g/dL Final   • BUN/Creatinine Ratio 05/08/2019 21.9  7.0 - 25.0 Final   • Anion Gap 05/08/2019 7.0  4.0 - 13.0 mmol/L Final   • WBC 05/08/2019 9.65  4.80 - 10.80 10*3/mm3 Final   • RBC 05/08/2019 3.25* 4.20 - 5.40 10*6/mm3 Final   • Hemoglobin 05/08/2019 9.4* 12.0 - 16.0 g/dL Final   • Hematocrit 05/08/2019 29.5* 37.0 - 47.0 % Final   • MCV 05/08/2019 90.8  82.0 - 98.0 fL Final   • MCH 05/08/2019 28.9  28.0 - 32.0 pg Final   • MCHC 05/08/2019 31.9* 33.0 - 36.0 g/dL Final   • RDW 05/08/2019 16.8* 12.0 - 15.0 % Final   • RDW-SD 05/08/2019 55.9* 40.0 - 54.0 fl Final   • MPV 05/08/2019 10.5  6.0 - 12.0 fL Final   • Platelets 05/08/2019 192  130 - 400 10*3/mm3 Final   • Neutrophil % 05/08/2019 73.9  39.0 - 78.0 % Final   • Lymphocyte % 05/08/2019 18.4  15.0 - 45.0 % Final   • Monocyte % 05/08/2019 4.6  4.0 - 12.0 % Final   • Eosinophil % 05/08/2019 2.5  0.0 - 4.0 % Final   • Basophil % 05/08/2019 0.2  0.0 - 2.0 % Final   • Immature Grans %  05/08/2019 0.4  0.0 - 5.0 % Final   • Neutrophils, Absolute 05/08/2019 7.13  1.87 - 8.40 10*3/mm3 Final   • Lymphocytes, Absolute 05/08/2019 1.78  0.72 - 4.86 10*3/mm3 Final   • Monocytes, Absolute 05/08/2019 0.44  0.19 - 1.30 10*3/mm3 Final   • Eosinophils, Absolute 05/08/2019 0.24  0.00 - 0.70 10*3/mm3 Final   • Basophils, Absolute 05/08/2019 0.02  0.00 - 0.20 10*3/mm3 Final   • Immature Grans, Absolute 05/08/2019 0.04  0.00 - 0.05 10*3/mm3 Final   • nRBC 05/08/2019 0.0  0.0 - 0.2 /100 WBC Final       RADIOLOGY:  No results found.         Assessment/Plan  Maria C Shrestha is a 59 y.o. year old female  Whom we are seeing for AoCKD on procrit that is stable.    Patient Active Problem List   Diagnosis   • Type 2 diabetes mellitus with stage 3 chronic kidney disease, with long-term current use of insulin (CMS/[facility])   • Mixed diabetic hyperlipidemia associated with type 2 diabetes mellitus (CMS/[facility])   • Hypertension associated with diabetes (CMS/[facility])   • Vitamin D deficiency   • B12 deficiency   • Anemia in CKD (chronic kidney disease)   • Acquired hypothyroidism   • Chronic kidney disease, stage 4 (severe) (CMS/[facility])          1. AoCKD: s/p iron infusions. Hg of 9.4. . Ok for shot today  --curently on procrit monthly as needed    2. HTN: on amlodipine, carvedilol, lisinopril    3.DM: on insulin    4.Hypothyroidism: on synthroid  5. Anxiety/depression: on diazepam, celexa  6. Hyperlipidemia: on crestor.  7. CKD stage 4: sees renal. grf 26        Rodrigo Huitron MD    5/8/2019    1:20 PM

## 2019-05-13 ENCOUNTER — OFFICE VISIT (OUTPATIENT)
Dept: ENDOCRINOLOGY | Facility: CLINIC | Age: 59
End: 2019-05-13

## 2019-05-13 VITALS
DIASTOLIC BLOOD PRESSURE: 80 MMHG | HEART RATE: 76 BPM | BODY MASS INDEX: 48.82 KG/M2 | OXYGEN SATURATION: 99 % | HEIGHT: 65 IN | SYSTOLIC BLOOD PRESSURE: 146 MMHG | WEIGHT: 293 LBS

## 2019-05-13 DIAGNOSIS — Z79.4 TYPE 2 DIABETES MELLITUS WITH STAGE 3 CHRONIC KIDNEY DISEASE, WITH LONG-TERM CURRENT USE OF INSULIN (HCC): Primary | ICD-10-CM

## 2019-05-13 DIAGNOSIS — I15.2 HYPERTENSION ASSOCIATED WITH DIABETES (HCC): ICD-10-CM

## 2019-05-13 DIAGNOSIS — E78.2 MIXED DIABETIC HYPERLIPIDEMIA ASSOCIATED WITH TYPE 2 DIABETES MELLITUS (HCC): ICD-10-CM

## 2019-05-13 DIAGNOSIS — E55.9 VITAMIN D DEFICIENCY: ICD-10-CM

## 2019-05-13 DIAGNOSIS — E11.22 TYPE 2 DIABETES MELLITUS WITH STAGE 3 CHRONIC KIDNEY DISEASE, WITH LONG-TERM CURRENT USE OF INSULIN (HCC): Primary | ICD-10-CM

## 2019-05-13 DIAGNOSIS — E03.9 ACQUIRED HYPOTHYROIDISM: ICD-10-CM

## 2019-05-13 DIAGNOSIS — E83.52 HYPERCALCEMIA: ICD-10-CM

## 2019-05-13 DIAGNOSIS — E11.69 MIXED DIABETIC HYPERLIPIDEMIA ASSOCIATED WITH TYPE 2 DIABETES MELLITUS (HCC): ICD-10-CM

## 2019-05-13 DIAGNOSIS — E11.59 HYPERTENSION ASSOCIATED WITH DIABETES (HCC): ICD-10-CM

## 2019-05-13 DIAGNOSIS — E53.8 B12 DEFICIENCY: ICD-10-CM

## 2019-05-13 DIAGNOSIS — N18.30 TYPE 2 DIABETES MELLITUS WITH STAGE 3 CHRONIC KIDNEY DISEASE, WITH LONG-TERM CURRENT USE OF INSULIN (HCC): Primary | ICD-10-CM

## 2019-05-13 DIAGNOSIS — M85.852 OSTEOPENIA OF LEFT HIP: ICD-10-CM

## 2019-05-13 PROCEDURE — 95249 CONT GLUC MNTR PT PROV EQP: CPT | Performed by: INTERNAL MEDICINE

## 2019-05-13 PROCEDURE — 99214 OFFICE O/P EST MOD 30 MIN: CPT | Performed by: INTERNAL MEDICINE

## 2019-05-13 RX ORDER — LEVOTHYROXINE SODIUM 0.07 MG/1
75 TABLET ORAL DAILY
Qty: 30 TABLET | Refills: 11 | Status: SHIPPED | OUTPATIENT
Start: 2019-05-13 | End: 2020-04-15

## 2019-05-13 NOTE — PROGRESS NOTES
Maria C Shrestha is a 59 y.o. female who presents for  evaluation of   Chief Complaint   Patient presents with   • Diabetes         Primary Care / Referring Provider  Ole Soliman MD    History of Present Illness  Duration/Timing:  Diabetes mellitus type 2  timing constant    quality controlled     severity high     Severity (Complications/Hospitalizations)  Secondary Microvascular Complications:  Diabetic Nephropathy, No Diabetic Neuropathy      Context  Diabetes Regimen:  Insulin, Compliant with regimen  Blood Glucose Readings  Mostly at goal but frequent hypoglycemia since starting keto diet     Diet    counts carbs, eating only 45 g cho per meal     Exercise:  Does not exercise    Associated Signs/Symptoms  Hyperglycemic Symptoms:  No polyuria, No polydipsia, No polyphagia, Weight loss with keto diet   Hypoglycemic Episodes:  No documented hypoglycemia    Past Medical History:   Diagnosis Date   • Acquired hypothyroidism 2/6/2017   • Allergic    • Anemia    • Anxiety    • Arthritis    • Cellulitis    • Chronic kidney disease     3rd stage   • Depression    • Disease of thyroid gland    • Dyslipidemia    • Elevated cholesterol    • Essential hypertension    • Fatigue    • Gallbladder abscess    • Hearing loss    • Light headed    • Limited range of motion (ROM) of shoulder     right   • Obesity    • Palpitation    • Short of breath on exertion    • Sleep apnea    • Type 2 diabetes mellitus (CMS/HCC)    • Type II diabetes mellitus, uncontrolled (CMS/HCC)    • Wears glasses      Family History   Problem Relation Age of Onset   • Diabetes Other    • Cancer Mother    • Cancer Father    • Heart disease Father    • Diabetes Father    • Obesity Father    • Stroke Father    • Cancer Maternal Grandmother    • Uterine cancer Maternal Grandmother    • Diabetes Maternal Grandfather    • Cancer Paternal Grandmother    • Colon cancer Paternal Grandmother    • Diabetes Paternal Grandfather    • Heart disease Paternal  Grandfather    • No Known Problems Sister    • No Known Problems Brother    • No Known Problems Daughter    • No Known Problems Son    • No Known Problems Maternal Aunt    • No Known Problems Paternal Aunt    • BRCA 1/2 Neg Hx    • Breast cancer Neg Hx    • Endometrial cancer Neg Hx    • Ovarian cancer Neg Hx      Social History     Tobacco Use   • Smoking status: Never Smoker   • Smokeless tobacco: Never Used   Substance Use Topics   • Alcohol use: No   • Drug use: No         Current Outpatient Medications:   •  allopurinol (ZYLOPRIM) 100 MG tablet, Take 100 mg by mouth 2 (Two) Times a Day., Disp: , Rfl:   •  amLODIPine (NORVASC) 5 MG tablet, , Disp: , Rfl:   •  aspirin 81 MG tablet, Take 81 mg by mouth Daily. Stop 7/22/2018, Disp: , Rfl:   •  busPIRone (BUSPAR) 10 MG tablet, buspirone 10 mg tablet  Take 2 tablets twice a day by oral route as needed for 90 days., Disp: , Rfl:   •  carvedilol (COREG) 3.125 MG tablet, Take 3.125 mg by mouth 2 (Two) Times a Day., Disp: , Rfl:   •  cetirizine (zyrTEC) 10 MG tablet, Take 10 mg by mouth As Needed., Disp: , Rfl:   •  Cholecalciferol (VITAMIN D3) 05481 units tablet, Vitamin D2 50,000 unit capsule  Take 1 capsule every week by oral route. Sunday, Disp: , Rfl:   •  cinacalcet (SENSIPAR) 30 MG tablet, Take 1 tablet by mouth Daily., Disp: 30 tablet, Rfl: 11  •  citalopram (CeleXA) 20 MG tablet, Take 20 mg by mouth Daily., Disp: , Rfl:   •  diazePAM (VALIUM) 2 MG tablet, Take 2 mg by mouth As Needed for Anxiety., Disp: , Rfl:   •  Dulaglutide (TRULICITY) 1.5 MG/0.5ML solution pen-injector, Inject 1.5 mg under the skin into the appropriate area as directed Every 7 (Seven) Days., Disp: 6 mL, Rfl: 3  •  fluticasone (VERAMYST) 27.5 MCG/SPRAY nasal spray, 2 sprays into each nostril Daily., Disp: , Rfl:   •  Glucose Blood (BLOOD GLUCOSE TEST) strip, Use 4 x daily, use any brand covered by insurance or same brand as before, Disp: 360 each, Rfl: 3  •  ibuprofen (ADVIL,MOTRIN) 800 MG  "tablet, Take 800 mg by mouth Every 8 (Eight) Hours As Needed for Mild Pain ., Disp: , Rfl:   •  insulin glargine (LANTUS) 100 UNIT/ML injection, INJECT 200 UNITS DAILY, Disp: 180 mL, Rfl: 3  •  Insulin Lispro (HUMALOG KWIKPEN) 200 UNIT/ML solution pen-injector, Inject 30 Units under the skin into the appropriate area as directed 3 (Three) Times a Day With Meals., Disp: 5 pen, Rfl: 11  •  Insulin Pen Needle (PEN NEEDLES) 31G X 6 MM misc, Use to inject insulin 4 times daily., Disp: 200 each, Rfl: 11  •  Insulin Syringe 31G X 5/16\" 1 ML misc, 4 x daily, Disp: 120 each, Rfl: 11  •  lamoTRIgine (LaMICtal) 50 MG tablet dispersible disintegrating tablet, Take 50 mg by mouth Every Night., Disp: , Rfl:   •  Lancets (FREESTYLE) lancets, FOUR TIMES A DAY, Disp: 150 each, Rfl: 11  •  levothyroxine (SYNTHROID, LEVOTHROID) 50 MCG tablet, Take 1 tablet by mouth Daily., Disp: 90 tablet, Rfl: 1  •  lisinopril (PRINIVIL,ZESTRIL) 20 MG tablet, Take 20 mg by mouth 2 (Two) Times a Day., Disp: , Rfl:   •  Multiple Vitamins-Minerals (MULTIVITAMIN ADULT PO), Take 1 tablet by mouth Daily., Disp: , Rfl:   •  potassium gluconate 595 (99 K) MG tablet tablet, potassium gluconate 595 mg (99 mg) tablet  Take 2 tablets every day by oral route., Disp: , Rfl:   •  rosuvastatin (CRESTOR) 10 MG tablet, Take 10 mg by mouth Daily., Disp: , Rfl:   •  sodium bicarbonate 650 MG tablet, Take 650 mg by mouth 3 (Three) Times a Day., Disp: , Rfl:   •  traMADol (ULTRAM) 50 MG tablet, Take 1 tablet by mouth Every 6 (Six) Hours As Needed for Moderate Pain ., Disp: 8 tablet, Rfl: 0    Review of Systems    Review of Systems   Constitutional: Positive for unexpected weight change. Negative for activity change, appetite change, chills, diaphoresis, fatigue and fever.   HENT: Negative for congestion, drooling, ear discharge, ear pain, facial swelling, mouth sores, nosebleeds, postnasal drip, sinus pressure, sneezing, sore throat, tinnitus, trouble swallowing and voice " "change.    Eyes: Negative.  Negative for photophobia, pain, discharge, redness and itching.   Respiratory: Negative.  Negative for apnea, cough, choking, chest tightness, shortness of breath, wheezing and stridor.    Cardiovascular: Negative.  Negative for chest pain, palpitations and leg swelling.   Gastrointestinal: Negative.  Negative for abdominal distention, abdominal pain, constipation, diarrhea, nausea and vomiting.   Endocrine: Positive for polydipsia, polyphagia and polyuria. Negative for cold intolerance and heat intolerance.   Genitourinary: Negative for difficulty urinating, dysuria, flank pain and frequency.   Musculoskeletal: Positive for arthralgias, back pain and myalgias. Negative for gait problem, joint swelling, neck pain and neck stiffness.   Skin: Negative for color change, pallor, rash and wound.   Allergic/Immunologic: Negative for environmental allergies, food allergies and immunocompromised state.   Neurological: Negative for dizziness, tremors, seizures, syncope, facial asymmetry, speech difficulty, weakness, light-headedness, numbness and headaches.   Hematological: Negative for adenopathy. Does not bruise/bleed easily.   Psychiatric/Behavioral: Negative for agitation, behavioral problems, confusion, decreased concentration, dysphoric mood, hallucinations, self-injury, sleep disturbance and suicidal ideas. The patient is not nervous/anxious and is not hyperactive.         Objective:     /80   Pulse 76   Ht 165.1 cm (65\")   Wt (!) 174 kg (384 lb 6.4 oz)   LMP  (LMP Unknown)   SpO2 99%   BMI 63.97 kg/m²     Physical Exam   Constitutional: She is oriented to person, place, and time. She appears well-developed.   HENT:   Head: Normocephalic.   Right Ear: External ear normal.   Left Ear: External ear normal.   Nose: Nose normal.   Eyes: Conjunctivae and EOM are normal. No scleral icterus.   Neck: Normal range of motion. Neck supple. No tracheal deviation present. No thyromegaly " present.   Cardiovascular: Normal rate, regular rhythm and intact distal pulses. Exam reveals no gallop and no friction rub.   Murmur heard.  Systolic murmur, carotid bruit    Pulmonary/Chest: Effort normal and breath sounds normal. No stridor. No respiratory distress. She has no wheezes. She has no rales. She exhibits no tenderness.   Abdominal: Soft. Bowel sounds are normal. She exhibits no distension and no mass. There is no tenderness. There is no rebound and no guarding.   Musculoskeletal: Normal range of motion. She exhibits no tenderness or deformity.   Lymphadenopathy:     She has no cervical adenopathy.   Neurological: She is alert and oriented to person, place, and time. She displays normal reflexes. She exhibits normal muscle tone. Coordination normal.   Skin: No rash noted. No erythema. No pallor.   Psychiatric: She has a normal mood and affect. Her behavior is normal. Judgment and thought content normal.       Lab Review            Assessment/Plan       ICD-10-CM ICD-9-CM   1. Type 2 diabetes mellitus with stage 3 chronic kidney disease, with long-term current use of insulin (CMS/HCC) E11.22 250.40    N18.3 585.3    Z79.4 V58.67   2. Mixed diabetic hyperlipidemia associated with type 2 diabetes mellitus (CMS/[facility]) E11.69 250.80    E78.2 272.2   3. Hypertension associated with diabetes (CMS/HCC) E11.59 250.80    I10 401.9   4. B12 deficiency E53.8 266.2   5. Vitamin D deficiency E55.9 268.9   6. Hypercalcemia E83.52 275.42   7. Acquired hypothyroidism E03.9 244.9   8. Osteoporosis, unspecified osteoporosis type, unspecified pathological fracture presence M81.0 733.00       Glycemic Management:   Lab Results   Component Value Date    HGBA1C 6.9 05/06/2019    HGBA1C 7.0 01/24/2019    HGBA1C 7.0 10/25/2018     Lab Results   Component Value Date    GLUCOSE 157 (H) 05/08/2019    BUN 40 (H) 05/08/2019    CREATININE 1.83 (H) 05/08/2019    EGFRIFNONA 28 (L) 05/08/2019    BCR 21.9 05/08/2019    CO2 25.0 05/08/2019     CALCIUM 9.1 05/08/2019    ALBUMIN 3.70 05/08/2019    AST 21 05/08/2019    ALT <15 05/08/2019     Lab Results   Component Value Date    WBC 9.65 05/08/2019    HGB 9.4 (L) 05/08/2019    HCT 29.5 (L) 05/08/2019    MCV 90.8 05/08/2019     05/08/2019     Lab Results   Component Value Date    CREATININE 1.83 (H) 05/08/2019    CREATININE 1.97 (H) 05/06/2019    CREATININE 2.00 (H) 04/10/2019    CREATININE 1.98 (H) 03/13/2019    CREATININE 1.97 (H) 02/13/2019          Trulicity 1.5 mg weekly       Humulin U 500 before     Lantus 160 at night     invokana stopped due to decline in renal function    Will give rx for u200 humalog up to 20-40 units with meals       avg deidra 156 from April 30, 2019 to May 13, 2019     No changes     She is eating more so using more humalog     Lipid Management    Lab Results   Component Value Date    TRIG 274 (H) 05/06/2019    TRIG 258 (H) 01/24/2019    TRIG 362 (H) 10/25/2018     Lab Results   Component Value Date    HDL 37 (L) 05/06/2019    HDL 34 (L) 01/24/2019    HDL 37 (L) 10/25/2018     No components found for: LDLCALC  Lab Results   Component Value Date    LDL 73 05/06/2019    LDL 83 01/24/2019    LDL 64 10/25/2018     Lab Results   Component Value Date    LDL 73 05/06/2019    LDL 83 01/24/2019    LDL 64 10/25/2018         on Crestor 20 mg daily   Generic causing myalgias    Return to brand name       Blood Pressure Management:    Vitals:    05/13/19 1349   BP: 146/80   Pulse: 76   SpO2: 99%         Per nephrology       Microvascular Complication Monitoring:  No Microalbuminuria, No Diabetic Retinopathy, Date of last eye exam 07/18/2016, No Diabetic Neuropathy  on ACE i     ckd stage IV    followed by nephrology     I suggest that she doesn't consume more than 140 g protein per day   Plant better than animal but restricted on keto     --      Lab Results   Component Value Date    CREATININE 1.83 (H) 05/08/2019    BUN 40 (H) 05/08/2019     05/08/2019    K 4.4 05/08/2019      05/08/2019    CO2 25.0 05/08/2019         Lab Results   Component Value Date    CREATININE 1.83 (H) 05/08/2019    CREATININE 1.97 (H) 05/06/2019    CREATININE 2.00 (H) 04/10/2019         Immunizations:  Last pneumococcal immunization pneumovax before age 65     Flu Shot will have in Mid Nov 2016       Preventive Care:  No smoking      Weight Related:   Wt Readings from Last 3 Encounters:   05/13/19 (!) 174 kg (384 lb 6.4 oz)   05/08/19 (!) 174 kg (384 lb)   05/08/19 (!) 175 kg (385 lb 6.4 oz)     Body mass index is 63.97 kg/m².      prefers no bariatric surgery    Now on cpap       Bone Health    Lab Results   Component Value Date    PTH 61 01/24/2019    CALCIUM 9.1 05/08/2019     For JARETH     Was having hypercalcemia with rocatrol 0.25 three times weekly and on calcium    Now on sensipar 30 mg daily but without calcium    Start calcium low dose 400 mg daily     DXA No 2018, osteopenia left femoral neck     Monitor PTH , no more than 150 , no less than 60       Thyroid Health  Lab Results   Component Value Date    TSH 4.720 (H) 05/06/2019     On levothyroxine 50 ug daily - increase to 75 mcgs daily     Other Diabetes Related Aspects       Lab Results   Component Value Date    [id number] 721 05/06/2019     Lab Results   Component Value Date    WBC 9.65 05/08/2019    HGB 9.4 (L) 05/08/2019    HCT 29.5 (L) 05/08/2019    MCV 90.8 05/08/2019     05/08/2019     Lab Results   Component Value Date    IRON 68 05/06/2019    TIBC 286 05/06/2019    FERRITIN 72.60 03/13/2019       Anemia , managed by nephrology   Deciding vs epo vs iron   But now   DUB, may need hysterectomy       Systolic Murmur, AS? Echo   Could be due to anemia     Echo and carotid US from 7-17 , normal echo and less than 50% blockage in carotids    I reviewed and summarized records from Ole Soliman MD from 2016 and I reviewed / ordered labs.     No orders of the defined types were placed in this encounter.        A copy of my note was sent  to Ole Soliman MD    Please see my above opinion and suggestions.

## 2019-06-05 ENCOUNTER — INFUSION (OUTPATIENT)
Dept: ONCOLOGY | Facility: HOSPITAL | Age: 59
End: 2019-06-05

## 2019-06-05 ENCOUNTER — LAB (OUTPATIENT)
Dept: LAB | Facility: HOSPITAL | Age: 59
End: 2019-06-05

## 2019-06-05 ENCOUNTER — OFFICE VISIT (OUTPATIENT)
Dept: ONCOLOGY | Facility: CLINIC | Age: 59
End: 2019-06-05

## 2019-06-05 VITALS
HEART RATE: 94 BPM | WEIGHT: 285.3 LBS | DIASTOLIC BLOOD PRESSURE: 76 MMHG | RESPIRATION RATE: 18 BRPM | SYSTOLIC BLOOD PRESSURE: 146 MMHG | HEIGHT: 65 IN | TEMPERATURE: 99.3 F | OXYGEN SATURATION: 92 % | BODY MASS INDEX: 47.53 KG/M2

## 2019-06-05 VITALS
BODY MASS INDEX: 47.65 KG/M2 | HEART RATE: 73 BPM | RESPIRATION RATE: 18 BRPM | DIASTOLIC BLOOD PRESSURE: 68 MMHG | TEMPERATURE: 98.9 F | OXYGEN SATURATION: 100 % | SYSTOLIC BLOOD PRESSURE: 197 MMHG | WEIGHT: 286 LBS | HEIGHT: 65 IN

## 2019-06-05 DIAGNOSIS — N18.30 ANEMIA IN STAGE 3 CHRONIC KIDNEY DISEASE (HCC): ICD-10-CM

## 2019-06-05 DIAGNOSIS — D63.1 ANEMIA IN STAGE 3 CHRONIC KIDNEY DISEASE (HCC): ICD-10-CM

## 2019-06-05 DIAGNOSIS — D63.1 ANEMIA IN STAGE 3 CHRONIC KIDNEY DISEASE (HCC): Primary | ICD-10-CM

## 2019-06-05 DIAGNOSIS — N18.30 ANEMIA IN STAGE 3 CHRONIC KIDNEY DISEASE (HCC): Primary | ICD-10-CM

## 2019-06-05 LAB
ALBUMIN SERPL-MCNC: 4 G/DL (ref 3.5–5)
ALBUMIN/GLOB SERPL: 1.3 G/DL (ref 1.1–2.5)
ALP SERPL-CCNC: 76 U/L (ref 24–120)
ALT SERPL W P-5'-P-CCNC: 15 U/L (ref 0–54)
ANION GAP SERPL CALCULATED.3IONS-SCNC: 8 MMOL/L (ref 4–13)
AST SERPL-CCNC: 24 U/L (ref 7–45)
BASOPHILS # BLD AUTO: 0.02 10*3/MM3 (ref 0–0.2)
BASOPHILS NFR BLD AUTO: 0.2 % (ref 0–2)
BILIRUB SERPL-MCNC: 0.3 MG/DL (ref 0.1–1)
BUN BLD-MCNC: 45 MG/DL (ref 5–21)
BUN/CREAT SERPL: 21.2 (ref 7–25)
CALCIUM SPEC-SCNC: 8.7 MG/DL (ref 8.4–10.4)
CHLORIDE SERPL-SCNC: 109 MMOL/L (ref 98–110)
CO2 SERPL-SCNC: 23 MMOL/L (ref 24–31)
CREAT BLD-MCNC: 2.12 MG/DL (ref 0.5–1.4)
DEPRECATED RDW RBC AUTO: 56.4 FL (ref 40–54)
EOSINOPHIL # BLD AUTO: 0.22 10*3/MM3 (ref 0–0.7)
EOSINOPHIL NFR BLD AUTO: 2.2 % (ref 0–4)
ERYTHROCYTE [DISTWIDTH] IN BLOOD BY AUTOMATED COUNT: 16.8 % (ref 12–15)
GFR SERPL CREATININE-BSD FRML MDRD: 24 ML/MIN/1.73
GLOBULIN UR ELPH-MCNC: 3.1 GM/DL
GLUCOSE BLD-MCNC: 173 MG/DL (ref 70–100)
HCT VFR BLD AUTO: 30.1 % (ref 37–47)
HGB BLD-MCNC: 9.6 G/DL (ref 12–16)
HOLD SPECIMEN: NORMAL
HOLD SPECIMEN: NORMAL
IMM GRANULOCYTES # BLD AUTO: 0.07 10*3/MM3 (ref 0–0.05)
IMM GRANULOCYTES NFR BLD AUTO: 0.7 % (ref 0–5)
LYMPHOCYTES # BLD AUTO: 1.92 10*3/MM3 (ref 0.72–4.86)
LYMPHOCYTES NFR BLD AUTO: 19.3 % (ref 15–45)
MCH RBC QN AUTO: 29.2 PG (ref 28–32)
MCHC RBC AUTO-ENTMCNC: 31.9 G/DL (ref 33–36)
MCV RBC AUTO: 91.5 FL (ref 82–98)
MONOCYTES # BLD AUTO: 0.6 10*3/MM3 (ref 0.19–1.3)
MONOCYTES NFR BLD AUTO: 6 % (ref 4–12)
NEUTROPHILS # BLD AUTO: 7.1 10*3/MM3 (ref 1.87–8.4)
NEUTROPHILS NFR BLD AUTO: 71.6 % (ref 39–78)
NRBC BLD AUTO-RTO: 0 /100 WBC (ref 0–0.2)
PLATELET # BLD AUTO: 217 10*3/MM3 (ref 130–400)
PMV BLD AUTO: 10.2 FL (ref 6–12)
POTASSIUM BLD-SCNC: 4.6 MMOL/L (ref 3.5–5.3)
PROT SERPL-MCNC: 7.1 G/DL (ref 6.3–8.7)
RBC # BLD AUTO: 3.29 10*6/MM3 (ref 4.2–5.4)
SODIUM BLD-SCNC: 140 MMOL/L (ref 135–145)
WBC NRBC COR # BLD: 9.93 10*3/MM3 (ref 4.8–10.8)

## 2019-06-05 PROCEDURE — 36415 COLL VENOUS BLD VENIPUNCTURE: CPT

## 2019-06-05 PROCEDURE — 85025 COMPLETE CBC W/AUTO DIFF WBC: CPT | Performed by: INTERNAL MEDICINE

## 2019-06-05 PROCEDURE — 80053 COMPREHEN METABOLIC PANEL: CPT | Performed by: INTERNAL MEDICINE

## 2019-06-05 PROCEDURE — 25010000002 EPOETIN ALFA PER 1000 UNITS: Performed by: INTERNAL MEDICINE

## 2019-06-05 PROCEDURE — 99214 OFFICE O/P EST MOD 30 MIN: CPT | Performed by: INTERNAL MEDICINE

## 2019-06-05 PROCEDURE — 96372 THER/PROPH/DIAG INJ SC/IM: CPT

## 2019-06-05 RX ADMIN — ERYTHROPOIETIN 40000 UNITS: 40000 INJECTION, SOLUTION INTRAVENOUS; SUBCUTANEOUS at 13:55

## 2019-06-05 NOTE — PROGRESS NOTES
[facility]  HEMATOLOGY & ONCOLOGY    Cancer Staging Information:  Cancer Staging  No matching staging information was found for the patient.      Subjective     VISIT DIAGNOSIS:   Encounter Diagnosis   Name Primary?   • Anemia in stage 3 chronic kidney disease (CMS/HCC)        REASON FOR VISIT:     Chief Complaint   Patient presents with   • Anemia     She is here for f/u visit today and to review her recent lab work, possible Procrit injection   • DEPRESSION SCREENING     Initial Depression Screening  (see chart)        HEMATOLOGY / ONCOLOGY HISTORY:    No history exists.           INTERVAL HISTORY  Patient ID: Maria C Shrestha is a 59 y.o. year old female Cancer Staging    6/5/19: no issues today. Denies sob,cp,n/v/dizziness.occassional blood streaked stool from hemorrhoids.  --rest of ros unremarkable. Physical exam unremarkable.    Past Medical History:   Past Medical History:   Diagnosis Date   • Acquired hypothyroidism 2/6/2017   • Allergic    • Anemia    • Anxiety    • Arthritis    • Cellulitis    • Chronic kidney disease     3rd stage   • Depression    • Disease of thyroid gland    • Dyslipidemia    • Elevated cholesterol    • Essential hypertension    • Fatigue    • Gallbladder abscess    • Hearing loss    • Light headed    • Limited range of motion (ROM) of shoulder     right   • Obesity    • Palpitation    • Short of breath on exertion    • Sleep apnea    • Type 2 diabetes mellitus (CMS/HCC)    • Type II diabetes mellitus, uncontrolled (CMS/HCC)    • Wears glasses      Past Surgical History:   Past Surgical History:   Procedure Laterality Date   • ADENOIDECTOMY     • ANAL FISTULA REPAIR      x 2    • CHOLECYSTECTOMY     • COLONOSCOPY  01/02/2014   • COLONOSCOPY N/A 3/22/2017    Procedure: COLONOSCOPY WITH ANESTHESIA;  Surgeon: Mono Linder MD;  Location: [facility] ENDOSCOPY;  Service:    • D&C HYSTEROSCOPY N/A 7/25/2018    Procedure: DILATATION AND CURETTAGE HYSTEROSCOPY;  Surgeon:  Michael Castaneda MD;  Location: [facility] OR;  Service: Obstetrics/Gynecology   • DILATATION AND CURETTAGE      X 2   • ENDOSCOPY     • TONSILLECTOMY       Social History:   Social History     Socioeconomic History   • Marital status:      Spouse name: Not on file   • Number of children: Not on file   • Years of education: Not on file   • Highest education level: Not on file   Tobacco Use   • Smoking status: Never Smoker   • Smokeless tobacco: Never Used   Substance and Sexual Activity   • Alcohol use: No   • Drug use: No   • Sexual activity: No     Birth control/protection: Post-menopausal     Family History:   Family History   Problem Relation Age of Onset   • Diabetes Other    • Cancer Mother    • Cancer Father    • Heart disease Father    • Diabetes Father    • Obesity Father    • Stroke Father    • Cancer Maternal Grandmother    • Uterine cancer Maternal Grandmother    • Diabetes Maternal Grandfather    • Cancer Paternal Grandmother    • Colon cancer Paternal Grandmother    • Diabetes Paternal Grandfather    • Heart disease Paternal Grandfather    • No Known Problems Sister    • No Known Problems Brother    • No Known Problems Daughter    • No Known Problems Son    • No Known Problems Maternal Aunt    • No Known Problems Paternal Aunt    • BRCA 1/2 Neg Hx    • Breast cancer Neg Hx    • Endometrial cancer Neg Hx    • Ovarian cancer Neg Hx        Review of Systems   See HPI otherwise unremarkable.  Performance Status:  Asymptomatic    Medications:    Current Outpatient Medications   Medication Sig Dispense Refill   • allopurinol (ZYLOPRIM) 100 MG tablet Take 100 mg by mouth 2 (Two) Times a Day.     • amLODIPine (NORVASC) 5 MG tablet      • aspirin 81 MG tablet Take 81 mg by mouth Daily. Stop 7/22/2018     • busPIRone (BUSPAR) 10 MG tablet buspirone 10 mg tablet   Take 2 tablets twice a day by oral route as needed for 90 days.     • carvedilol (COREG) 3.125 MG tablet Take 3.125 mg by mouth 2 (Two) Times a  "Day.     • cetirizine (zyrTEC) 10 MG tablet Take 10 mg by mouth As Needed.     • Cholecalciferol (VITAMIN D3) 63672 units tablet Vitamin D2 50,000 unit capsule   Take 1 capsule every week by oral route. Sunday     • cinacalcet (SENSIPAR) 30 MG tablet Take 1 tablet by mouth Daily. 30 tablet 11   • citalopram (CeleXA) 20 MG tablet Take 20 mg by mouth Daily.     • diazePAM (VALIUM) 2 MG tablet Take 2 mg by mouth As Needed for Anxiety.     • Dulaglutide (TRULICITY) 1.5 MG/0.5ML solution pen-injector Inject 1.5 mg under the skin into the appropriate area as directed Every 7 (Seven) Days. 6 mL 3   • fluticasone (VERAMYST) 27.5 MCG/SPRAY nasal spray 2 sprays into each nostril Daily.     • Glucose Blood (BLOOD GLUCOSE TEST) strip Use 4 x daily, use any brand covered by insurance or same brand as before 360 each 3   • ibuprofen (ADVIL,MOTRIN) 800 MG tablet Take 800 mg by mouth Every 8 (Eight) Hours As Needed for Mild Pain .     • insulin glargine (LANTUS) 100 UNIT/ML injection INJECT 200 UNITS DAILY 180 mL 3   • Insulin Lispro (HUMALOG KWIKPEN) 200 UNIT/ML solution pen-injector Inject 30 Units under the skin into the appropriate area as directed 3 (Three) Times a Day With Meals. 5 pen 11   • Insulin Pen Needle (PEN NEEDLES) 31G X 6 MM misc Use to inject insulin 4 times daily. 200 each 11   • Insulin Syringe 31G X 5/16\" 1 ML misc 4 x daily 120 each 11   • lamoTRIgine (LaMICtal) 50 MG tablet dispersible disintegrating tablet Take 50 mg by mouth Every Night.     • Lancets (FREESTYLE) lancets FOUR TIMES A  each 11   • levothyroxine (SYNTHROID) 75 MCG tablet Take 1 tablet by mouth Daily. 30 tablet 11   • lisinopril (PRINIVIL,ZESTRIL) 20 MG tablet Take 20 mg by mouth 2 (Two) Times a Day.     • Multiple Vitamins-Minerals (MULTIVITAMIN ADULT PO) Take 1 tablet by mouth Daily.     • potassium gluconate 595 (99 K) MG tablet tablet potassium gluconate 595 mg (99 mg) tablet   Take 2 tablets every day by oral route.     • " "rosuvastatin (CRESTOR) 10 MG tablet Take 10 mg by mouth Daily.     • sodium bicarbonate 650 MG tablet Take 650 mg by mouth 3 (Three) Times a Day.     • traMADol (ULTRAM) 50 MG tablet Take 1 tablet by mouth Every 6 (Six) Hours As Needed for Moderate Pain . 8 tablet 0     No current facility-administered medications for this visit.        ALLERGIES:    Allergies   Allergen Reactions   • Codeine Nausea Only       Objective      Vitals:    06/05/19 1305   BP: 146/76   Pulse: 94   Resp: 18   Temp: 99.3 °F (37.4 °C)   TempSrc: Tympanic   SpO2: 92%   Weight: 129 kg (285 lb 4.8 oz)   Height: 165.1 cm (65\")   PainSc: 0-No pain         Current Status 6/5/2019   ECOG score 3         Physical Examremains the same  General Appearance:obese female.  Patient is awake, alert, oriented and in no acute distress. Patient is welldeveloped, wellnourished, and appears stated age.  HEENT: Normocephalic. Sclerae clear, conjunctiva pink, extraocular movements intact, pupils, round, reactive to light and  accommodation. Mouth and throat are clear with moist oral mucosa.  NECK: Supple, no jugular venous distention, thyroid not enlarged.  LYMPH: No cervical, supraclavicular, axillary, or inguinal lymphadenopathy.  CHEST: Equal bilateral expansion, AP  diameter normal, resonant percussion note  LUNGS: Good air movement, no rales, rhonchi, rubs or wheezes with auscultation  CARDIO: Regular sinus rhythm, no murmurs, gallops or rubs.  ABDOMEN: obese abdomen. Nondistended, soft, No tenderness, no guarding, no rebound, No hepatosplenomegaly. No abdominal masses. Bowel sounds positive. No hernia  GENITALIA: Not examined.  BREASTS: Not examined.  MUSKEL: No joint swelling, decreased motion, or inflammation  EXTREMS: kristie YAZAN. No clubbing, cyanosis, No varicose veins.  NEURO: Grossly nonfocal, Gait is coordinated and smooth, Cognition is preserved.  SKIN: No rashes, no ecchymoses, no petechia.  PSYCH: Oriented to time, place and person. Memory is " preserved. Mood and affect appear normal  RECENT LABS:  Lab on 06/05/2019   Component Date Value Ref Range Status   • Glucose 06/05/2019 173* 70 - 100 mg/dL Final   • BUN 06/05/2019 45* 5 - 21 mg/dL Final   • Creatinine 06/05/2019 2.12* 0.50 - 1.40 mg/dL Final   • Sodium 06/05/2019 140  135 - 145 mmol/L Final   • Potassium 06/05/2019 4.6  3.5 - 5.3 mmol/L Final   • Chloride 06/05/2019 109  98 - 110 mmol/L Final   • CO2 06/05/2019 23.0* 24.0 - 31.0 mmol/L Final   • Calcium 06/05/2019 8.7  8.4 - 10.4 mg/dL Final   • Total Protein 06/05/2019 7.1  6.3 - 8.7 g/dL Final   • Albumin 06/05/2019 4.00  3.50 - 5.00 g/dL Final   • ALT (SGPT) 06/05/2019 15  0 - 54 U/L Final   • AST (SGOT) 06/05/2019 24  7 - 45 U/L Final   • Alkaline Phosphatase 06/05/2019 76  24 - 120 U/L Final   • Total Bilirubin 06/05/2019 0.3  0.1 - 1.0 mg/dL Final   • eGFR Non African Amer 06/05/2019 24* >60 mL/min/1.73 Final   • Globulin 06/05/2019 3.1  gm/dL Final   • A/G Ratio 06/05/2019 1.3  1.1 - 2.5 g/dL Final   • BUN/Creatinine Ratio 06/05/2019 21.2  7.0 - 25.0 Final   • Anion Gap 06/05/2019 8.0  4.0 - 13.0 mmol/L Final   • WBC 06/05/2019 9.93  4.80 - 10.80 10*3/mm3 Final   • RBC 06/05/2019 3.29* 4.20 - 5.40 10*6/mm3 Final   • Hemoglobin 06/05/2019 9.6* 12.0 - 16.0 g/dL Final   • Hematocrit 06/05/2019 30.1* 37.0 - 47.0 % Final   • MCV 06/05/2019 91.5  82.0 - 98.0 fL Final   • MCH 06/05/2019 29.2  28.0 - 32.0 pg Final   • MCHC 06/05/2019 31.9* 33.0 - 36.0 g/dL Final   • RDW 06/05/2019 16.8* 12.0 - 15.0 % Final   • RDW-SD 06/05/2019 56.4* 40.0 - 54.0 fl Final   • MPV 06/05/2019 10.2  6.0 - 12.0 fL Final   • Platelets 06/05/2019 217  130 - 400 10*3/mm3 Final   • Neutrophil % 06/05/2019 71.6  39.0 - 78.0 % Final   • Lymphocyte % 06/05/2019 19.3  15.0 - 45.0 % Final   • Monocyte % 06/05/2019 6.0  4.0 - 12.0 % Final   • Eosinophil % 06/05/2019 2.2  0.0 - 4.0 % Final   • Basophil % 06/05/2019 0.2  0.0 - 2.0 % Final   • Immature Grans % 06/05/2019 0.7  0.0 -  5.0 % Final   • Neutrophils, Absolute 06/05/2019 7.10  1.87 - 8.40 10*3/mm3 Final   • Lymphocytes, Absolute 06/05/2019 1.92  0.72 - 4.86 10*3/mm3 Final   • Monocytes, Absolute 06/05/2019 0.60  0.19 - 1.30 10*3/mm3 Final   • Eosinophils, Absolute 06/05/2019 0.22  0.00 - 0.70 10*3/mm3 Final   • Basophils, Absolute 06/05/2019 0.02  0.00 - 0.20 10*3/mm3 Final   • Immature Grans, Absolute 06/05/2019 0.07* 0.00 - 0.05 10*3/mm3 Final   • nRBC 06/05/2019 0.0  0.0 - 0.2 /100 WBC Final       RADIOLOGY:  No results found.         Assessment/Plan  Maria C Shrestha is a 59 y.o. year old female  Whom we are seeing for AoCKD on procrit that is stable.    Patient Active Problem List   Diagnosis   • Type 2 diabetes mellitus with stage 3 chronic kidney disease, with long-term current use of insulin (CMS/[facility])   • Mixed diabetic hyperlipidemia associated with type 2 diabetes mellitus (CMS/[facility])   • Hypertension associated with diabetes (CMS/[facility])   • Vitamin D deficiency   • B12 deficiency   • Anemia in CKD (chronic kidney disease)   • Acquired hypothyroidism   • Chronic kidney disease, stage 4 (severe) (CMS/[facility])          1. AoCKD: s/p iron infusions. Hg of 9.6. . Ok for procrit shot today      2. HTN: on amlodipine, carvedilol, lisinopril    3.DM: on insulin    4.Hypothyroidism: on synthroid  5. Anxiety/depression: on diazepam, celexa  6. Hyperlipidemia: on crestor.  7. CKD stage 4: sees renal. grf 26        Rodrigo Huitron MD    6/5/2019    1:22 PM

## 2019-07-03 ENCOUNTER — OFFICE VISIT (OUTPATIENT)
Dept: ONCOLOGY | Facility: CLINIC | Age: 59
End: 2019-07-03

## 2019-07-03 ENCOUNTER — LAB (OUTPATIENT)
Dept: LAB | Facility: HOSPITAL | Age: 59
End: 2019-07-03

## 2019-07-03 ENCOUNTER — INFUSION (OUTPATIENT)
Dept: ONCOLOGY | Facility: HOSPITAL | Age: 59
End: 2019-07-03

## 2019-07-03 VITALS
WEIGHT: 287.4 LBS | SYSTOLIC BLOOD PRESSURE: 160 MMHG | OXYGEN SATURATION: 98 % | TEMPERATURE: 98.5 F | RESPIRATION RATE: 16 BRPM | DIASTOLIC BLOOD PRESSURE: 62 MMHG | BODY MASS INDEX: 47.88 KG/M2 | HEART RATE: 76 BPM | HEIGHT: 65 IN

## 2019-07-03 VITALS
SYSTOLIC BLOOD PRESSURE: 166 MMHG | WEIGHT: 287 LBS | TEMPERATURE: 98.5 F | OXYGEN SATURATION: 97 % | HEART RATE: 75 BPM | DIASTOLIC BLOOD PRESSURE: 70 MMHG | HEIGHT: 65 IN | BODY MASS INDEX: 47.82 KG/M2 | RESPIRATION RATE: 17 BRPM

## 2019-07-03 DIAGNOSIS — N18.30 ANEMIA IN STAGE 3 CHRONIC KIDNEY DISEASE (HCC): Primary | ICD-10-CM

## 2019-07-03 DIAGNOSIS — D63.1 ANEMIA IN STAGE 3 CHRONIC KIDNEY DISEASE (HCC): ICD-10-CM

## 2019-07-03 DIAGNOSIS — D63.1 ANEMIA IN STAGE 3 CHRONIC KIDNEY DISEASE (HCC): Primary | ICD-10-CM

## 2019-07-03 DIAGNOSIS — N18.30 ANEMIA IN STAGE 3 CHRONIC KIDNEY DISEASE (HCC): ICD-10-CM

## 2019-07-03 LAB
ALBUMIN SERPL-MCNC: 3.7 G/DL (ref 3.5–5)
ALBUMIN/GLOB SERPL: 1.2 G/DL (ref 1.1–2.5)
ALP SERPL-CCNC: 82 U/L (ref 24–120)
ALT SERPL W P-5'-P-CCNC: <15 U/L (ref 0–54)
ANION GAP SERPL CALCULATED.3IONS-SCNC: 8 MMOL/L (ref 4–13)
AST SERPL-CCNC: 26 U/L (ref 7–45)
BASOPHILS # BLD AUTO: 0.04 10*3/MM3 (ref 0–0.2)
BASOPHILS NFR BLD AUTO: 0.4 % (ref 0–2)
BILIRUB SERPL-MCNC: 0.3 MG/DL (ref 0.1–1)
BUN BLD-MCNC: 45 MG/DL (ref 5–21)
BUN/CREAT SERPL: 21.1 (ref 7–25)
CALCIUM SPEC-SCNC: 8.6 MG/DL (ref 8.4–10.4)
CHLORIDE SERPL-SCNC: 108 MMOL/L (ref 98–110)
CO2 SERPL-SCNC: 26 MMOL/L (ref 24–31)
CREAT BLD-MCNC: 2.13 MG/DL (ref 0.5–1.4)
DEPRECATED RDW RBC AUTO: 55.8 FL (ref 40–54)
EOSINOPHIL # BLD AUTO: 0.2 10*3/MM3 (ref 0–0.7)
EOSINOPHIL NFR BLD AUTO: 2 % (ref 0–4)
ERYTHROCYTE [DISTWIDTH] IN BLOOD BY AUTOMATED COUNT: 16.6 % (ref 12–15)
GFR SERPL CREATININE-BSD FRML MDRD: 24 ML/MIN/1.73
GLOBULIN UR ELPH-MCNC: 3.2 GM/DL
GLUCOSE BLD-MCNC: 107 MG/DL (ref 70–100)
HCT VFR BLD AUTO: 31.5 % (ref 37–47)
HGB BLD-MCNC: 10.3 G/DL (ref 12–16)
HOLD SPECIMEN: NORMAL
HOLD SPECIMEN: NORMAL
IMM GRANULOCYTES # BLD AUTO: 0.06 10*3/MM3 (ref 0–0.05)
IMM GRANULOCYTES NFR BLD AUTO: 0.6 % (ref 0–5)
LYMPHOCYTES # BLD AUTO: 1.82 10*3/MM3 (ref 0.72–4.86)
LYMPHOCYTES NFR BLD AUTO: 18.2 % (ref 15–45)
MCH RBC QN AUTO: 30.1 PG (ref 28–32)
MCHC RBC AUTO-ENTMCNC: 32.7 G/DL (ref 33–36)
MCV RBC AUTO: 92.1 FL (ref 82–98)
MONOCYTES # BLD AUTO: 0.55 10*3/MM3 (ref 0.19–1.3)
MONOCYTES NFR BLD AUTO: 5.5 % (ref 4–12)
NEUTROPHILS # BLD AUTO: 7.35 10*3/MM3 (ref 1.87–8.4)
NEUTROPHILS NFR BLD AUTO: 73.3 % (ref 39–78)
NRBC BLD AUTO-RTO: 0 /100 WBC (ref 0–0.2)
PLATELET # BLD AUTO: 217 10*3/MM3 (ref 130–400)
PMV BLD AUTO: 10.3 FL (ref 6–12)
POTASSIUM BLD-SCNC: 4 MMOL/L (ref 3.5–5.3)
PROT SERPL-MCNC: 6.9 G/DL (ref 6.3–8.7)
RBC # BLD AUTO: 3.42 10*6/MM3 (ref 4.2–5.4)
SODIUM BLD-SCNC: 142 MMOL/L (ref 135–145)
WBC NRBC COR # BLD: 10.02 10*3/MM3 (ref 4.8–10.8)

## 2019-07-03 PROCEDURE — 80053 COMPREHEN METABOLIC PANEL: CPT | Performed by: INTERNAL MEDICINE

## 2019-07-03 PROCEDURE — 99214 OFFICE O/P EST MOD 30 MIN: CPT | Performed by: INTERNAL MEDICINE

## 2019-07-03 PROCEDURE — 36415 COLL VENOUS BLD VENIPUNCTURE: CPT

## 2019-07-03 PROCEDURE — 85025 COMPLETE CBC W/AUTO DIFF WBC: CPT | Performed by: INTERNAL MEDICINE

## 2019-07-03 PROCEDURE — 96372 THER/PROPH/DIAG INJ SC/IM: CPT

## 2019-07-03 PROCEDURE — 25010000002 EPOETIN ALFA PER 1000 UNITS: Performed by: INTERNAL MEDICINE

## 2019-07-03 RX ORDER — PHENOL 1.4 %
AEROSOL, SPRAY (ML) MUCOUS MEMBRANE NIGHTLY PRN
COMMUNITY
End: 2020-12-21

## 2019-07-03 RX ADMIN — ERYTHROPOIETIN 40000 UNITS: 40000 INJECTION, SOLUTION INTRAVENOUS; SUBCUTANEOUS at 14:26

## 2019-07-03 NOTE — PROGRESS NOTES
[facility]  HEMATOLOGY & ONCOLOGY    Cancer Staging Information:  Cancer Staging  No matching staging information was found for the patient.      Subjective     VISIT DIAGNOSIS:   Encounter Diagnosis   Name Primary?   • Anemia in stage 3 chronic kidney disease (CMS/HCC)        REASON FOR VISIT:     Chief Complaint   Patient presents with   • Anemia        HEMATOLOGY / ONCOLOGY HISTORY:    No history exists.           INTERVAL HISTORY  Patient ID: Maria C Shrestha is a 59 y.o. year old female Cancer Staging    7/3/19: no issues today. Denies sob,cp,n/v/dizziness.occassional blood streaked stool from hemorrhoids.  --rest of ros unremarkable. Physical exam unremarkable.    Past Medical History:   Past Medical History:   Diagnosis Date   • Acquired hypothyroidism 2/6/2017   • Allergic    • Anemia    • Anxiety    • Arthritis    • Cellulitis    • Chronic kidney disease     3rd stage   • Depression    • Disease of thyroid gland    • Dyslipidemia    • Elevated cholesterol    • Essential hypertension    • Fatigue    • Gallbladder abscess    • Hearing loss    • Light headed    • Limited range of motion (ROM) of shoulder     right   • Obesity    • Palpitation    • Short of breath on exertion    • Sleep apnea    • Type 2 diabetes mellitus (CMS/HCC)    • Type II diabetes mellitus, uncontrolled (CMS/HCC)    • Wears glasses      Past Surgical History:   Past Surgical History:   Procedure Laterality Date   • ADENOIDECTOMY     • ANAL FISTULA REPAIR      x 2    • CHOLECYSTECTOMY     • COLONOSCOPY  01/02/2014   • COLONOSCOPY N/A 3/22/2017    Procedure: COLONOSCOPY WITH ANESTHESIA;  Surgeon: Mono Linder MD;  Location: [facility] ENDOSCOPY;  Service:    • D&C HYSTEROSCOPY N/A 7/25/2018    Procedure: DILATATION AND CURETTAGE HYSTEROSCOPY;  Surgeon: Michael Castaneda MD;  Location: [facility] OR;  Service: Obstetrics/Gynecology   • DILATATION AND CURETTAGE      X 2   • ENDOSCOPY     • TONSILLECTOMY       Social History:    Social History     Socioeconomic History   • Marital status:      Spouse name: Not on file   • Number of children: Not on file   • Years of education: Not on file   • Highest education level: Not on file   Tobacco Use   • Smoking status: Never Smoker   • Smokeless tobacco: Never Used   Substance and Sexual Activity   • Alcohol use: No   • Drug use: No   • Sexual activity: No     Birth control/protection: Post-menopausal     Family History:   Family History   Problem Relation Age of Onset   • Diabetes Other    • Cancer Mother    • Cancer Father    • Heart disease Father    • Diabetes Father    • Obesity Father    • Stroke Father    • Cancer Maternal Grandmother    • Uterine cancer Maternal Grandmother    • Diabetes Maternal Grandfather    • Cancer Paternal Grandmother    • Colon cancer Paternal Grandmother    • Diabetes Paternal Grandfather    • Heart disease Paternal Grandfather    • No Known Problems Sister    • No Known Problems Brother    • No Known Problems Daughter    • No Known Problems Son    • No Known Problems Maternal Aunt    • No Known Problems Paternal Aunt    • BRCA 1/2 Neg Hx    • Breast cancer Neg Hx    • Endometrial cancer Neg Hx    • Ovarian cancer Neg Hx        Review of Systems   See HPI otherwise unremarkable.  Performance Status:  Asymptomatic    Medications:    Current Outpatient Medications   Medication Sig Dispense Refill   • allopurinol (ZYLOPRIM) 100 MG tablet Take 100 mg by mouth 2 (Two) Times a Day.     • amLODIPine (NORVASC) 5 MG tablet      • aspirin 81 MG tablet Take 81 mg by mouth Daily. Stop 7/22/2018     • busPIRone (BUSPAR) 10 MG tablet buspirone 10 mg tablet   Take 2 tablets twice a day by oral route as needed for 90 days.     • carvedilol (COREG) 3.125 MG tablet Take 3.125 mg by mouth 2 (Two) Times a Day.     • cetirizine (zyrTEC) 10 MG tablet Take 10 mg by mouth As Needed.     • Cholecalciferol (VITAMIN D3) 03748 units tablet Vitamin D2 50,000 unit capsule   Take 1  "capsule every week by oral route. Sunday     • cinacalcet (SENSIPAR) 30 MG tablet Take 1 tablet by mouth Daily. 30 tablet 11   • citalopram (CeleXA) 20 MG tablet Take 20 mg by mouth Daily.     • diazePAM (VALIUM) 2 MG tablet Take 2 mg by mouth As Needed for Anxiety.     • Dulaglutide (TRULICITY) 1.5 MG/0.5ML solution pen-injector Inject 1.5 mg under the skin into the appropriate area as directed Every 7 (Seven) Days. 6 mL 3   • fluticasone (VERAMYST) 27.5 MCG/SPRAY nasal spray 2 sprays into each nostril Daily.     • Glucose Blood (BLOOD GLUCOSE TEST) strip Use 4 x daily, use any brand covered by insurance or same brand as before 360 each 3   • ibuprofen (ADVIL,MOTRIN) 800 MG tablet Take 800 mg by mouth Every 8 (Eight) Hours As Needed for Mild Pain .     • insulin glargine (LANTUS) 100 UNIT/ML injection INJECT 200 UNITS DAILY 180 mL 3   • Insulin Lispro (HUMALOG KWIKPEN) 200 UNIT/ML solution pen-injector Inject 30 Units under the skin into the appropriate area as directed 3 (Three) Times a Day With Meals. 5 pen 11   • Insulin Pen Needle (PEN NEEDLES) 31G X 6 MM misc Use to inject insulin 4 times daily. 200 each 11   • Insulin Syringe 31G X 5/16\" 1 ML misc 4 x daily 120 each 11   • lamoTRIgine (LaMICtal) 50 MG tablet dispersible disintegrating tablet Take 50 mg by mouth Every Night.     • Lancets (FREESTYLE) lancets FOUR TIMES A  each 11   • levothyroxine (SYNTHROID) 75 MCG tablet Take 1 tablet by mouth Daily. 30 tablet 11   • lisinopril (PRINIVIL,ZESTRIL) 20 MG tablet Take 20 mg by mouth 2 (Two) Times a Day.     • Melatonin 10 MG tablet Take  by mouth.     • Multiple Vitamins-Minerals (MULTIVITAMIN ADULT PO) Take 1 tablet by mouth Daily.     • potassium gluconate 595 (99 K) MG tablet tablet potassium gluconate 595 mg (99 mg) tablet   Take 2 tablets every day by oral route.     • rosuvastatin (CRESTOR) 10 MG tablet Take 10 mg by mouth Daily.     • sodium bicarbonate 650 MG tablet Take 650 mg by mouth 3 (Three) " "Times a Day.     • traMADol (ULTRAM) 50 MG tablet Take 1 tablet by mouth Every 6 (Six) Hours As Needed for Moderate Pain . 8 tablet 0     No current facility-administered medications for this visit.        ALLERGIES:    Allergies   Allergen Reactions   • Codeine Nausea Only       Objective      Vitals:    07/03/19 1321   BP: 160/62   Pulse: 76   Resp: 16   Temp: 98.5 °F (36.9 °C)   TempSrc: Oral   SpO2: 98%   Weight: 130 kg (287 lb 6.4 oz)   Height: 165.1 cm (65\")   PainSc: 0-No pain         Current Status 6/5/2019   ECOG score 3         Physical Examremains the same  General Appearance:obese female.  Patient is awake, alert, oriented and in no acute distress. Patient is welldeveloped, wellnourished, and appears stated age.  HEENT: Normocephalic. Sclerae clear, conjunctiva pink, extraocular movements intact, pupils, round, reactive to light and  accommodation. Mouth and throat are clear with moist oral mucosa.  NECK: Supple, no jugular venous distention, thyroid not enlarged.  LYMPH: No cervical, supraclavicular, axillary, or inguinal lymphadenopathy.  CHEST: Equal bilateral expansion, AP  diameter normal, resonant percussion note  LUNGS: Good air movement, no rales, rhonchi, rubs or wheezes with auscultation  CARDIO: Regular sinus rhythm, no murmurs, gallops or rubs.  ABDOMEN: obese abdomen. Nondistended, soft, No tenderness, no guarding, no rebound, No hepatosplenomegaly. No abdominal masses. Bowel sounds positive. No hernia  GENITALIA: Not examined.  BREASTS: Not examined.  MUSKEL: No joint swelling, decreased motion, or inflammation  EXTREMS: kristie YAZAN. No clubbing, cyanosis, No varicose veins.  NEURO: Grossly nonfocal, Gait is coordinated and smooth, Cognition is preserved.  SKIN: No rashes, no ecchymoses, no petechia.  PSYCH: Oriented to time, place and person. Memory is preserved. Mood and affect appear normal  RECENT LABS:  Lab on 07/03/2019   Component Date Value Ref Range Status   • WBC 07/03/2019 10.02  " 4.80 - 10.80 10*3/mm3 Final   • RBC 07/03/2019 3.42* 4.20 - 5.40 10*6/mm3 Final   • Hemoglobin 07/03/2019 10.3* 12.0 - 16.0 g/dL Final   • Hematocrit 07/03/2019 31.5* 37.0 - 47.0 % Final   • MCV 07/03/2019 92.1  82.0 - 98.0 fL Final   • MCH 07/03/2019 30.1  28.0 - 32.0 pg Final   • MCHC 07/03/2019 32.7* 33.0 - 36.0 g/dL Final   • RDW 07/03/2019 16.6* 12.0 - 15.0 % Final   • RDW-SD 07/03/2019 55.8* 40.0 - 54.0 fl Final   • MPV 07/03/2019 10.3  6.0 - 12.0 fL Final   • Platelets 07/03/2019 217  130 - 400 10*3/mm3 Final   • Neutrophil % 07/03/2019 73.3  39.0 - 78.0 % Final   • Lymphocyte % 07/03/2019 18.2  15.0 - 45.0 % Final   • Monocyte % 07/03/2019 5.5  4.0 - 12.0 % Final   • Eosinophil % 07/03/2019 2.0  0.0 - 4.0 % Final   • Basophil % 07/03/2019 0.4  0.0 - 2.0 % Final   • Immature Grans % 07/03/2019 0.6  0.0 - 5.0 % Final   • Neutrophils, Absolute 07/03/2019 7.35  1.87 - 8.40 10*3/mm3 Final   • Lymphocytes, Absolute 07/03/2019 1.82  0.72 - 4.86 10*3/mm3 Final   • Monocytes, Absolute 07/03/2019 0.55  0.19 - 1.30 10*3/mm3 Final   • Eosinophils, Absolute 07/03/2019 0.20  0.00 - 0.70 10*3/mm3 Final   • Basophils, Absolute 07/03/2019 0.04  0.00 - 0.20 10*3/mm3 Final   • Immature Grans, Absolute 07/03/2019 0.06* 0.00 - 0.05 10*3/mm3 Final   • nRBC 07/03/2019 0.0  0.0 - 0.2 /100 WBC Final       RADIOLOGY:  No results found.         Assessment/Plan  Maria C Shrestha is a 59 y.o. year old female  Whom we are seeing for AoCKD on procrit that is stable.    Patient Active Problem List   Diagnosis   • Type 2 diabetes mellitus with stage 3 chronic kidney disease, with long-term current use of insulin (CMS/HCC)   • Mixed diabetic hyperlipidemia associated with type 2 diabetes mellitus (CMS/HCC)   • Hypertension associated with diabetes (CMS/HCC)   • Vitamin D deficiency   • B12 deficiency   • Anemia in CKD (chronic kidney disease)   • Acquired hypothyroidism   • Chronic kidney disease, stage 4 (severe) (CMS/HCC)          1. AoCKD:  s/p iron infusions. Hg of 10.3. . Ok for procrit shot today      2. HTN: on amlodipine, carvedilol, lisinopril    3.DM: on insulin    4.Hypothyroidism: on synthroid  5. Anxiety/depression: on diazepam, celexa  6. Hyperlipidemia: on crestor.  7. CKD stage 4: sees renal. grf 26        Marcelogeli Luis Huitron MD    7/3/2019    1:48 PM

## 2019-07-25 ENCOUNTER — TELEPHONE (OUTPATIENT)
Dept: FAMILY MEDICINE CLINIC | Facility: CLINIC | Age: 59
End: 2019-07-25

## 2019-08-07 ENCOUNTER — OFFICE VISIT (OUTPATIENT)
Dept: ONCOLOGY | Facility: CLINIC | Age: 59
End: 2019-08-07

## 2019-08-07 ENCOUNTER — LAB (OUTPATIENT)
Dept: LAB | Facility: HOSPITAL | Age: 59
End: 2019-08-07

## 2019-08-07 ENCOUNTER — INFUSION (OUTPATIENT)
Dept: ONCOLOGY | Facility: HOSPITAL | Age: 59
End: 2019-08-07

## 2019-08-07 VITALS
OXYGEN SATURATION: 98 % | BODY MASS INDEX: 48.07 KG/M2 | SYSTOLIC BLOOD PRESSURE: 162 MMHG | RESPIRATION RATE: 16 BRPM | TEMPERATURE: 98 F | HEART RATE: 92 BPM | WEIGHT: 288.5 LBS | HEIGHT: 65 IN | DIASTOLIC BLOOD PRESSURE: 76 MMHG

## 2019-08-07 VITALS
SYSTOLIC BLOOD PRESSURE: 184 MMHG | BODY MASS INDEX: 45.99 KG/M2 | HEART RATE: 73 BPM | DIASTOLIC BLOOD PRESSURE: 66 MMHG | OXYGEN SATURATION: 99 % | WEIGHT: 293 LBS | HEIGHT: 67 IN | RESPIRATION RATE: 20 BRPM | TEMPERATURE: 98.8 F

## 2019-08-07 DIAGNOSIS — N18.30 ANEMIA IN STAGE 3 CHRONIC KIDNEY DISEASE (HCC): Primary | ICD-10-CM

## 2019-08-07 DIAGNOSIS — D63.1 ANEMIA IN STAGE 3 CHRONIC KIDNEY DISEASE (HCC): ICD-10-CM

## 2019-08-07 DIAGNOSIS — D63.1 ANEMIA IN STAGE 3 CHRONIC KIDNEY DISEASE (HCC): Primary | ICD-10-CM

## 2019-08-07 DIAGNOSIS — N18.30 ANEMIA IN STAGE 3 CHRONIC KIDNEY DISEASE (HCC): ICD-10-CM

## 2019-08-07 LAB
ALBUMIN SERPL-MCNC: 3.7 G/DL (ref 3.5–5)
ALBUMIN/GLOB SERPL: 1.1 G/DL (ref 1.1–2.5)
ALP SERPL-CCNC: 86 U/L (ref 24–120)
ALT SERPL W P-5'-P-CCNC: 18 U/L (ref 0–54)
ANION GAP SERPL CALCULATED.3IONS-SCNC: 6 MMOL/L (ref 4–13)
AST SERPL-CCNC: 23 U/L (ref 7–45)
BASOPHILS # BLD AUTO: 0.03 10*3/MM3 (ref 0–0.2)
BASOPHILS NFR BLD AUTO: 0.3 % (ref 0–1.5)
BILIRUB SERPL-MCNC: 0.3 MG/DL (ref 0.1–1)
BUN BLD-MCNC: 39 MG/DL (ref 5–21)
BUN/CREAT SERPL: 19.9 (ref 7–25)
CALCIUM SPEC-SCNC: 9.3 MG/DL (ref 8.4–10.4)
CHLORIDE SERPL-SCNC: 111 MMOL/L (ref 98–110)
CO2 SERPL-SCNC: 23 MMOL/L (ref 24–31)
CREAT BLD-MCNC: 1.96 MG/DL (ref 0.5–1.4)
DEPRECATED RDW RBC AUTO: 54 FL (ref 37–54)
EOSINOPHIL # BLD AUTO: 0.19 10*3/MM3 (ref 0–0.4)
EOSINOPHIL NFR BLD AUTO: 1.7 % (ref 0.3–6.2)
ERYTHROCYTE [DISTWIDTH] IN BLOOD BY AUTOMATED COUNT: 16.1 % (ref 12.3–15.4)
FERRITIN SERPL-MCNC: 90.1 NG/ML (ref 11.1–264)
GFR SERPL CREATININE-BSD FRML MDRD: 26 ML/MIN/1.73
GLOBULIN UR ELPH-MCNC: 3.3 GM/DL
GLUCOSE BLD-MCNC: 179 MG/DL (ref 70–100)
HCT VFR BLD AUTO: 32.6 % (ref 34–46.6)
HGB BLD-MCNC: 10.8 G/DL (ref 12–15.9)
HOLD SPECIMEN: NORMAL
HOLD SPECIMEN: NORMAL
IMM GRANULOCYTES # BLD AUTO: 0.07 10*3/MM3 (ref 0–0.05)
IMM GRANULOCYTES NFR BLD AUTO: 0.6 % (ref 0–0.5)
IRON 24H UR-MRATE: 77 MCG/DL (ref 42–180)
IRON SATN MFR SERPL: 28 % (ref 20–45)
LYMPHOCYTES # BLD AUTO: 1.92 10*3/MM3 (ref 0.7–3.1)
LYMPHOCYTES NFR BLD AUTO: 17.3 % (ref 19.6–45.3)
MCH RBC QN AUTO: 30 PG (ref 26.6–33)
MCHC RBC AUTO-ENTMCNC: 33.1 G/DL (ref 31.5–35.7)
MCV RBC AUTO: 90.6 FL (ref 79–97)
MONOCYTES # BLD AUTO: 0.61 10*3/MM3 (ref 0.1–0.9)
MONOCYTES NFR BLD AUTO: 5.5 % (ref 5–12)
NEUTROPHILS # BLD AUTO: 8.26 10*3/MM3 (ref 1.7–7)
NEUTROPHILS NFR BLD AUTO: 74.6 % (ref 42.7–76)
NRBC BLD AUTO-RTO: 0 /100 WBC (ref 0–0.2)
PLATELET # BLD AUTO: 207 10*3/MM3 (ref 140–450)
PMV BLD AUTO: 9.8 FL (ref 6–12)
POTASSIUM BLD-SCNC: 4.1 MMOL/L (ref 3.5–5.3)
PROT SERPL-MCNC: 7 G/DL (ref 6.3–8.7)
RBC # BLD AUTO: 3.6 10*6/MM3 (ref 3.77–5.28)
SODIUM BLD-SCNC: 140 MMOL/L (ref 135–145)
TIBC SERPL-MCNC: 276 MCG/DL (ref 225–420)
WBC NRBC COR # BLD: 11.08 10*3/MM3 (ref 3.4–10.8)

## 2019-08-07 PROCEDURE — 83540 ASSAY OF IRON: CPT | Performed by: INTERNAL MEDICINE

## 2019-08-07 PROCEDURE — 96372 THER/PROPH/DIAG INJ SC/IM: CPT

## 2019-08-07 PROCEDURE — 82728 ASSAY OF FERRITIN: CPT | Performed by: INTERNAL MEDICINE

## 2019-08-07 PROCEDURE — 80053 COMPREHEN METABOLIC PANEL: CPT | Performed by: INTERNAL MEDICINE

## 2019-08-07 PROCEDURE — 25010000002 EPOETIN ALFA PER 1000 UNITS: Performed by: INTERNAL MEDICINE

## 2019-08-07 PROCEDURE — 85025 COMPLETE CBC W/AUTO DIFF WBC: CPT | Performed by: INTERNAL MEDICINE

## 2019-08-07 PROCEDURE — 99214 OFFICE O/P EST MOD 30 MIN: CPT | Performed by: INTERNAL MEDICINE

## 2019-08-07 PROCEDURE — 36415 COLL VENOUS BLD VENIPUNCTURE: CPT

## 2019-08-07 PROCEDURE — 83550 IRON BINDING TEST: CPT | Performed by: INTERNAL MEDICINE

## 2019-08-07 RX ADMIN — ERYTHROPOIETIN 40000 UNITS: 40000 INJECTION, SOLUTION INTRAVENOUS; SUBCUTANEOUS at 14:34

## 2019-08-07 NOTE — PROGRESS NOTES
[facility]  HEMATOLOGY & ONCOLOGY    Cancer Staging Information:  Cancer Staging  No matching staging information was found for the patient.      Subjective     VISIT DIAGNOSIS:   Encounter Diagnosis   Name Primary?   • Anemia in stage 3 chronic kidney disease (CMS/HCC)        REASON FOR VISIT:     Chief Complaint   Patient presents with   • Anemia     Possible procrit        HEMATOLOGY / ONCOLOGY HISTORY:    No history exists.           INTERVAL HISTORY  Patient ID: Maria C Shrestha is a 59 y.o. year old female Cancer Staging    8/7/19: no issues today. Denies sob,cp,n/v/dizziness.occassional blood streaked stool from hemorrhoids.  --rest of ros unremarkable. Physical exam unremarkable.    Past Medical History:   Past Medical History:   Diagnosis Date   • Acquired hypothyroidism 2/6/2017   • Allergic    • Anemia    • Anxiety    • Arthritis    • Cellulitis    • Chronic kidney disease     3rd stage   • Depression    • Disease of thyroid gland    • Dyslipidemia    • Elevated cholesterol    • Essential hypertension    • Fatigue    • Gallbladder abscess    • Hearing loss    • Light headed    • Limited range of motion (ROM) of shoulder     right   • Obesity    • Palpitation    • Short of breath on exertion    • Sleep apnea    • Type 2 diabetes mellitus (CMS/HCC)    • Type II diabetes mellitus, uncontrolled (CMS/HCC)    • Wears glasses      Past Surgical History:   Past Surgical History:   Procedure Laterality Date   • ADENOIDECTOMY     • ANAL FISTULA REPAIR      x 2    • CHOLECYSTECTOMY     • COLONOSCOPY  01/02/2014   • COLONOSCOPY N/A 3/22/2017    Procedure: COLONOSCOPY WITH ANESTHESIA;  Surgeon: Mono Linder MD;  Location: [facility] ENDOSCOPY;  Service:    • D&C HYSTEROSCOPY N/A 7/25/2018    Procedure: DILATATION AND CURETTAGE HYSTEROSCOPY;  Surgeon: Michael Castaneda MD;  Location: [facility] OR;  Service: Obstetrics/Gynecology   • DILATATION AND CURETTAGE      X 2   • ENDOSCOPY     • TONSILLECTOMY        Social History:   Social History     Socioeconomic History   • Marital status:      Spouse name: Not on file   • Number of children: Not on file   • Years of education: Not on file   • Highest education level: Not on file   Tobacco Use   • Smoking status: Never Smoker   • Smokeless tobacco: Never Used   Substance and Sexual Activity   • Alcohol use: No   • Drug use: No   • Sexual activity: No     Birth control/protection: Post-menopausal     Family History:   Family History   Problem Relation Age of Onset   • Diabetes Other    • Cancer Mother    • Cancer Father    • Heart disease Father    • Diabetes Father    • Obesity Father    • Stroke Father    • Cancer Maternal Grandmother    • Uterine cancer Maternal Grandmother    • Diabetes Maternal Grandfather    • Cancer Paternal Grandmother    • Colon cancer Paternal Grandmother    • Diabetes Paternal Grandfather    • Heart disease Paternal Grandfather    • No Known Problems Sister    • No Known Problems Brother    • No Known Problems Daughter    • No Known Problems Son    • No Known Problems Maternal Aunt    • No Known Problems Paternal Aunt    • BRCA 1/2 Neg Hx    • Breast cancer Neg Hx    • Endometrial cancer Neg Hx    • Ovarian cancer Neg Hx        Review of Systems   See HPI otherwise unremarkable.  Performance Status:  Asymptomatic    Medications:    Current Outpatient Medications   Medication Sig Dispense Refill   • allopurinol (ZYLOPRIM) 100 MG tablet Take 100 mg by mouth 2 (Two) Times a Day.     • amLODIPine (NORVASC) 5 MG tablet      • aspirin 81 MG tablet Take 81 mg by mouth Daily. Stop 7/22/2018     • busPIRone (BUSPAR) 10 MG tablet buspirone 10 mg tablet   Take 2 tablets twice a day by oral route as needed for 90 days.     • carvedilol (COREG) 3.125 MG tablet Take 3.125 mg by mouth 2 (Two) Times a Day.     • cetirizine (zyrTEC) 10 MG tablet Take 10 mg by mouth As Needed.     • Cholecalciferol (VITAMIN D3) 03484 units tablet Vitamin D2 50,000 unit  "capsule   Take 1 capsule every week by oral route. Sunday     • cinacalcet (SENSIPAR) 30 MG tablet Take 1 tablet by mouth Daily. 30 tablet 11   • citalopram (CeleXA) 20 MG tablet Take 20 mg by mouth Daily.     • diazePAM (VALIUM) 2 MG tablet Take 2 mg by mouth As Needed for Anxiety.     • Dulaglutide (TRULICITY) 1.5 MG/0.5ML solution pen-injector Inject 1.5 mg under the skin into the appropriate area as directed Every 7 (Seven) Days. 6 mL 3   • fluticasone (VERAMYST) 27.5 MCG/SPRAY nasal spray 2 sprays into each nostril Daily.     • Glucose Blood (BLOOD GLUCOSE TEST) strip Use 4 x daily, use any brand covered by insurance or same brand as before 360 each 3   • ibuprofen (ADVIL,MOTRIN) 800 MG tablet Take 800 mg by mouth Every 8 (Eight) Hours As Needed for Mild Pain .     • insulin glargine (LANTUS) 100 UNIT/ML injection INJECT 200 UNITS DAILY 180 mL 3   • Insulin Lispro (HUMALOG KWIKPEN) 200 UNIT/ML solution pen-injector Inject 30 Units under the skin into the appropriate area as directed 3 (Three) Times a Day With Meals. 5 pen 11   • Insulin Pen Needle (PEN NEEDLES) 31G X 6 MM misc Use to inject insulin 4 times daily. 200 each 11   • Insulin Syringe 31G X 5/16\" 1 ML misc 4 x daily 120 each 11   • lamoTRIgine (LaMICtal) 50 MG tablet dispersible disintegrating tablet Take 50 mg by mouth Every Night.     • Lancets (FREESTYLE) lancets FOUR TIMES A  each 11   • levothyroxine (SYNTHROID) 75 MCG tablet Take 1 tablet by mouth Daily. 30 tablet 11   • lisinopril (PRINIVIL,ZESTRIL) 20 MG tablet Take 20 mg by mouth 2 (Two) Times a Day.     • Melatonin 10 MG tablet Take  by mouth.     • Multiple Vitamins-Minerals (MULTIVITAMIN ADULT PO) Take 1 tablet by mouth Daily.     • potassium gluconate 595 (99 K) MG tablet tablet potassium gluconate 595 mg (99 mg) tablet   Take 2 tablets every day by oral route.     • rosuvastatin (CRESTOR) 10 MG tablet Take 10 mg by mouth Daily.     • sodium bicarbonate 650 MG tablet Take 650 mg by " "mouth 3 (Three) Times a Day.     • traMADol (ULTRAM) 50 MG tablet Take 1 tablet by mouth Every 6 (Six) Hours As Needed for Moderate Pain . 8 tablet 0     No current facility-administered medications for this visit.        ALLERGIES:    Allergies   Allergen Reactions   • Codeine Nausea Only       Objective      Vitals:    08/07/19 1314   BP: 162/76   Pulse: 92   Resp: 16   Temp: 98 °F (36.7 °C)   TempSrc: Oral   SpO2: 98%   Weight: 131 kg (288 lb 8 oz)   Height: 165.1 cm (65\")   PainSc: 0-No pain         Current Status 6/5/2019   ECOG score 3         Physical Examremains the same  General Appearance:obese female.  Patient is awake, alert, oriented and in no acute distress. Patient is welldeveloped, wellnourished, and appears stated age.  HEENT: Normocephalic. Sclerae clear, conjunctiva pink, extraocular movements intact, pupils, round, reactive to light and  accommodation. Mouth and throat are clear with moist oral mucosa.  NECK: Supple, no jugular venous distention, thyroid not enlarged.  LYMPH: No cervical, supraclavicular, axillary, or inguinal lymphadenopathy.  CHEST: Equal bilateral expansion, AP  diameter normal, resonant percussion note  LUNGS: Good air movement, no rales, rhonchi, rubs or wheezes with auscultation  CARDIO: Regular sinus rhythm, no murmurs, gallops or rubs.  ABDOMEN: obese abdomen. Nondistended, soft, No tenderness, no guarding, no rebound, No hepatosplenomegaly. No abdominal masses. Bowel sounds positive. No hernia  GENITALIA: Not examined.  BREASTS: Not examined.  MUSKEL: No joint swelling, decreased motion, or inflammation  EXTREMS: kristie YAZAN. No clubbing, cyanosis, No varicose veins.  NEURO: Grossly nonfocal, Gait is coordinated and smooth, Cognition is preserved.  SKIN: No rashes, no ecchymoses, no petechia.  PSYCH: Oriented to time, place and person. Memory is preserved. Mood and affect appear normal  RECENT LABS:  Lab on 08/07/2019   Component Date Value Ref Range Status   • Glucose " 08/07/2019 179* 70 - 100 mg/dL Final   • BUN 08/07/2019 39* 5 - 21 mg/dL Final   • Creatinine 08/07/2019 1.96* 0.50 - 1.40 mg/dL Final   • Sodium 08/07/2019 140  135 - 145 mmol/L Final   • Potassium 08/07/2019 4.1  3.5 - 5.3 mmol/L Final   • Chloride 08/07/2019 111* 98 - 110 mmol/L Final   • CO2 08/07/2019 23.0* 24.0 - 31.0 mmol/L Final   • Calcium 08/07/2019 9.3  8.4 - 10.4 mg/dL Final   • Total Protein 08/07/2019 7.0  6.3 - 8.7 g/dL Final   • Albumin 08/07/2019 3.70  3.50 - 5.00 g/dL Final   • ALT (SGPT) 08/07/2019 18  0 - 54 U/L Final   • AST (SGOT) 08/07/2019 23  7 - 45 U/L Final   • Alkaline Phosphatase 08/07/2019 86  24 - 120 U/L Final   • Total Bilirubin 08/07/2019 0.3  0.1 - 1.0 mg/dL Final   • eGFR Non African Amer 08/07/2019 26* >60 mL/min/1.73 Final   • Globulin 08/07/2019 3.3  gm/dL Final   • A/G Ratio 08/07/2019 1.1  1.1 - 2.5 g/dL Final   • BUN/Creatinine Ratio 08/07/2019 19.9  7.0 - 25.0 Final   • Anion Gap 08/07/2019 6.0  4.0 - 13.0 mmol/L Final   • WBC 08/07/2019 11.08* 3.40 - 10.80 10*3/mm3 Final   • RBC 08/07/2019 3.60* 3.77 - 5.28 10*6/mm3 Final   • Hemoglobin 08/07/2019 10.8* 12.0 - 15.9 g/dL Final   • Hematocrit 08/07/2019 32.6* 34.0 - 46.6 % Final   • MCV 08/07/2019 90.6  79.0 - 97.0 fL Final   • MCH 08/07/2019 30.0  26.6 - 33.0 pg Final   • MCHC 08/07/2019 33.1  31.5 - 35.7 g/dL Final   • RDW 08/07/2019 16.1* 12.3 - 15.4 % Final   • RDW-SD 08/07/2019 54.0  37.0 - 54.0 fl Final   • MPV 08/07/2019 9.8  6.0 - 12.0 fL Final   • Platelets 08/07/2019 207  140 - 450 10*3/mm3 Final   • Neutrophil % 08/07/2019 74.6  42.7 - 76.0 % Final   • Lymphocyte % 08/07/2019 17.3* 19.6 - 45.3 % Final   • Monocyte % 08/07/2019 5.5  5.0 - 12.0 % Final   • Eosinophil % 08/07/2019 1.7  0.3 - 6.2 % Final   • Basophil % 08/07/2019 0.3  0.0 - 1.5 % Final   • Immature Grans % 08/07/2019 0.6* 0.0 - 0.5 % Final   • Neutrophils, Absolute 08/07/2019 8.26* 1.70 - 7.00 10*3/mm3 Final   • Lymphocytes, Absolute 08/07/2019 1.92   0.70 - 3.10 10*3/mm3 Final   • Monocytes, Absolute 08/07/2019 0.61  0.10 - 0.90 10*3/mm3 Final   • Eosinophils, Absolute 08/07/2019 0.19  0.00 - 0.40 10*3/mm3 Final   • Basophils, Absolute 08/07/2019 0.03  0.00 - 0.20 10*3/mm3 Final   • Immature Grans, Absolute 08/07/2019 0.07* 0.00 - 0.05 10*3/mm3 Final   • nRBC 08/07/2019 0.0  0.0 - 0.2 /100 WBC Final       RADIOLOGY:  No results found.         Assessment/Plan  Maria C Shrestha is a 59 y.o. year old female  Whom we are seeing for AoCKD on procrit that is stable.    Patient Active Problem List   Diagnosis   • Type 2 diabetes mellitus with stage 3 chronic kidney disease, with long-term current use of insulin (CMS/[facility])   • Mixed diabetic hyperlipidemia associated with type 2 diabetes mellitus (CMS/[facility])   • Hypertension associated with diabetes (CMS/[facility])   • Vitamin D deficiency   • B12 deficiency   • Anemia in CKD (chronic kidney disease)   • Acquired hypothyroidism   • Chronic kidney disease, stage 4 (severe) (CMS/[facility])          1. AoCKD: s/p iron infusions. Hg of 10.8. . Ok for procrit shot today  -check iron profile, ferritin and act accordingly.      2. HTN: on amlodipine, carvedilol, lisinopril    3.DM: on insulin    4.Hypothyroidism: on synthroid  5. Anxiety/depression: on diazepam, celexa  6. Hyperlipidemia: on crestor.  7. CKD stage 4: sees renal. grf 26        Rodrigo Huitron MD    8/7/2019    1:37 PM   Ftsg Text: The defect edges were debeveled with a #15c scalpel blade.  Given the location of the defect, shape of the defect and the proximity to free margins a full thickness skin graft was deemed most appropriate.  Using a sterile surgical marker, the primary defect shape was transferred to the donor site. The area thus outlined was incised deep to adipose tissue with a #15c scalpel blade.  The harvested graft was then trimmed of adipose tissue until only dermis and epidermis was left.  The skin margins of the secondary defect were undermined to an appropriate distance in all directions utilizing iris scissors.  The secondary defect was closed with interrupted buried subcutaneous sutures.  The skin edges were then re-apposed with running  sutures.  The skin graft was then placed in the primary defect and oriented appropriately.

## 2019-08-13 ENCOUNTER — TELEPHONE (OUTPATIENT)
Dept: FAMILY MEDICINE CLINIC | Facility: CLINIC | Age: 59
End: 2019-08-13

## 2019-08-13 RX ORDER — CINACALCET 30 MG/1
30 TABLET, FILM COATED ORAL DAILY
Qty: 30 TABLET | Refills: 3 | Status: SHIPPED | OUTPATIENT
Start: 2019-08-13 | End: 2020-05-20

## 2019-09-04 ENCOUNTER — APPOINTMENT (OUTPATIENT)
Dept: LAB | Facility: HOSPITAL | Age: 59
End: 2019-09-04

## 2019-09-04 ENCOUNTER — LAB (OUTPATIENT)
Dept: LAB | Facility: HOSPITAL | Age: 59
End: 2019-09-04

## 2019-09-04 ENCOUNTER — APPOINTMENT (OUTPATIENT)
Dept: ONCOLOGY | Facility: HOSPITAL | Age: 59
End: 2019-09-04

## 2019-09-04 DIAGNOSIS — E11.22 TYPE 2 DIABETES MELLITUS WITH ESRD (END-STAGE RENAL DISEASE) (HCC): Primary | ICD-10-CM

## 2019-09-04 DIAGNOSIS — Z79.4 ENCOUNTER FOR LONG-TERM (CURRENT) USE OF INSULIN (HCC): ICD-10-CM

## 2019-09-04 DIAGNOSIS — N18.6 TYPE 2 DIABETES MELLITUS WITH ESRD (END-STAGE RENAL DISEASE) (HCC): Primary | ICD-10-CM

## 2019-09-04 LAB
25(OH)D3 SERPL-MCNC: 23.5 NG/ML (ref 30–100)
ALBUMIN SERPL-MCNC: 3.5 G/DL (ref 3.5–5)
ALBUMIN/GLOB SERPL: 1.1 G/DL (ref 1.1–2.5)
ALP SERPL-CCNC: 73 U/L (ref 24–120)
ALT SERPL W P-5'-P-CCNC: 17 U/L (ref 0–54)
ANION GAP SERPL CALCULATED.3IONS-SCNC: 9 MMOL/L (ref 4–13)
AST SERPL-CCNC: 17 U/L (ref 7–45)
AUTO MIXED CELLS #: 0.3 10*3/MM3 (ref 0.1–2.6)
AUTO MIXED CELLS %: 3.7 % (ref 0.1–24)
BILIRUB SERPL-MCNC: 0.3 MG/DL (ref 0.1–1)
BUN BLD-MCNC: 39 MG/DL (ref 5–21)
BUN/CREAT SERPL: 18
CALCIUM SPEC-SCNC: 8.7 MG/DL (ref 8.4–10.4)
CALCIUM SPEC-SCNC: 8.8 MG/DL (ref 8.4–10.4)
CHLORIDE SERPL-SCNC: 108 MMOL/L (ref 98–110)
CHOLEST SERPL-MCNC: 180 MG/DL (ref 130–200)
CO2 SERPL-SCNC: 24 MMOL/L (ref 24–31)
CREAT BLD-MCNC: 2.17 MG/DL (ref 0.5–1.4)
ERYTHROCYTE [DISTWIDTH] IN BLOOD BY AUTOMATED COUNT: 16.2 % (ref 12.3–15.4)
GFR SERPL CREATININE-BSD FRML MDRD: 23 ML/MIN/1.73
GLOBULIN UR ELPH-MCNC: 3.3 GM/DL
GLUCOSE BLD-MCNC: 145 MG/DL (ref 70–100)
HBA1C MFR BLD: 6.7 % (ref 4.8–5.9)
HCT VFR BLD AUTO: 31.5 % (ref 34–46.6)
HDLC SERPL-MCNC: 35 MG/DL
HGB BLD-MCNC: 10.4 G/DL (ref 12–15.9)
LDLC SERPL CALC-MCNC: 70 MG/DL (ref 0–99)
LDLC/HDLC SERPL: 2.01 {RATIO}
LYMPHOCYTES # BLD AUTO: 1.6 10*3/MM3 (ref 0.7–3.1)
LYMPHOCYTES NFR BLD AUTO: 18.1 % (ref 19.6–45.3)
MCH RBC QN AUTO: 29.7 PG (ref 26.6–33)
MCHC RBC AUTO-ENTMCNC: 33 G/DL (ref 31.5–35.7)
MCV RBC AUTO: 90 FL (ref 79–97)
NEUTROPHILS # BLD AUTO: 6.8 10*3/MM3 (ref 1.7–7)
NEUTROPHILS NFR BLD AUTO: 78.2 % (ref 42.7–76)
PLATELET # BLD AUTO: 228 10*3/MM3 (ref 140–450)
PMV BLD AUTO: 9.1 FL (ref 6–12)
POTASSIUM BLD-SCNC: 3.8 MMOL/L (ref 3.5–5.3)
PROT SERPL-MCNC: 6.8 G/DL (ref 6.3–8.7)
PTH-INTACT SERPL-MCNC: 38.3 PG/ML (ref 7.5–53.5)
RBC # BLD AUTO: 3.5 10*6/MM3 (ref 3.77–5.28)
SODIUM BLD-SCNC: 141 MMOL/L (ref 135–145)
TRIGL SERPL-MCNC: 374 MG/DL (ref 0–149)
TSH SERPL DL<=0.05 MIU/L-ACNC: 2.57 UIU/ML (ref 0.47–4.68)
VLDLC SERPL-MCNC: 74.8 MG/DL
WBC NRBC COR # BLD: 8.7 10*3/MM3 (ref 3.4–10.8)

## 2019-09-04 PROCEDURE — 36415 COLL VENOUS BLD VENIPUNCTURE: CPT | Performed by: INTERNAL MEDICINE

## 2019-09-04 PROCEDURE — 80061 LIPID PANEL: CPT | Performed by: INTERNAL MEDICINE

## 2019-09-04 PROCEDURE — 84443 ASSAY THYROID STIM HORMONE: CPT | Performed by: INTERNAL MEDICINE

## 2019-09-04 PROCEDURE — 83036 HEMOGLOBIN GLYCOSYLATED A1C: CPT | Performed by: INTERNAL MEDICINE

## 2019-09-04 PROCEDURE — 82570 ASSAY OF URINE CREATININE: CPT | Performed by: INTERNAL MEDICINE

## 2019-09-04 PROCEDURE — 82310 ASSAY OF CALCIUM: CPT | Performed by: INTERNAL MEDICINE

## 2019-09-04 PROCEDURE — 82043 UR ALBUMIN QUANTITATIVE: CPT | Performed by: INTERNAL MEDICINE

## 2019-09-04 PROCEDURE — 80053 COMPREHEN METABOLIC PANEL: CPT | Performed by: INTERNAL MEDICINE

## 2019-09-04 PROCEDURE — 82306 VITAMIN D 25 HYDROXY: CPT | Performed by: INTERNAL MEDICINE

## 2019-09-04 PROCEDURE — 82607 VITAMIN B-12: CPT | Performed by: INTERNAL MEDICINE

## 2019-09-04 PROCEDURE — 85025 COMPLETE CBC W/AUTO DIFF WBC: CPT | Performed by: INTERNAL MEDICINE

## 2019-09-04 PROCEDURE — 83970 ASSAY OF PARATHORMONE: CPT | Performed by: INTERNAL MEDICINE

## 2019-09-05 LAB
CREAT 24H UR-MCNC: 75.8 MG/DL
MICROALBUMIN UR-MCNC: 2272.9 UG/ML
MICROALBUMIN/CREAT UR: 2998.5 MG/G CREAT (ref 0–30)
VIT B12 BLD-MCNC: 618 PG/ML (ref 239–931)

## 2019-09-09 ENCOUNTER — LAB (OUTPATIENT)
Dept: LAB | Facility: HOSPITAL | Age: 59
End: 2019-09-09

## 2019-09-09 ENCOUNTER — OFFICE VISIT (OUTPATIENT)
Dept: ONCOLOGY | Facility: CLINIC | Age: 59
End: 2019-09-09

## 2019-09-09 ENCOUNTER — INFUSION (OUTPATIENT)
Dept: ONCOLOGY | Facility: HOSPITAL | Age: 59
End: 2019-09-09

## 2019-09-09 VITALS
RESPIRATION RATE: 18 BRPM | TEMPERATURE: 97.6 F | DIASTOLIC BLOOD PRESSURE: 62 MMHG | SYSTOLIC BLOOD PRESSURE: 182 MMHG | WEIGHT: 293 LBS | BODY MASS INDEX: 45.99 KG/M2 | HEIGHT: 67 IN | HEART RATE: 75 BPM | OXYGEN SATURATION: 97 %

## 2019-09-09 VITALS
WEIGHT: 293 LBS | HEIGHT: 67 IN | DIASTOLIC BLOOD PRESSURE: 78 MMHG | HEART RATE: 68 BPM | RESPIRATION RATE: 16 BRPM | BODY MASS INDEX: 45.99 KG/M2 | OXYGEN SATURATION: 98 % | TEMPERATURE: 97.6 F | SYSTOLIC BLOOD PRESSURE: 162 MMHG

## 2019-09-09 DIAGNOSIS — D63.1 ANEMIA IN STAGE 3 CHRONIC KIDNEY DISEASE (HCC): Primary | ICD-10-CM

## 2019-09-09 DIAGNOSIS — N18.30 ANEMIA IN STAGE 3 CHRONIC KIDNEY DISEASE (HCC): Primary | ICD-10-CM

## 2019-09-09 LAB
ALBUMIN SERPL-MCNC: 3.6 G/DL (ref 3.5–5)
ALBUMIN/GLOB SERPL: 1.1 G/DL (ref 1.1–2.5)
ALP SERPL-CCNC: 71 U/L (ref 24–120)
ALT SERPL W P-5'-P-CCNC: 17 U/L (ref 0–54)
ANION GAP SERPL CALCULATED.3IONS-SCNC: 8 MMOL/L (ref 4–13)
AST SERPL-CCNC: 21 U/L (ref 7–45)
BASOPHILS # BLD AUTO: 0.04 10*3/MM3 (ref 0–0.2)
BASOPHILS NFR BLD AUTO: 0.4 % (ref 0–1.5)
BILIRUB SERPL-MCNC: 0.3 MG/DL (ref 0.1–1)
BUN BLD-MCNC: 38 MG/DL (ref 5–21)
BUN/CREAT SERPL: 18.4 (ref 7–25)
CALCIUM SPEC-SCNC: 8.1 MG/DL (ref 8.4–10.4)
CHLORIDE SERPL-SCNC: 109 MMOL/L (ref 98–110)
CO2 SERPL-SCNC: 25 MMOL/L (ref 24–31)
CREAT BLD-MCNC: 2.07 MG/DL (ref 0.5–1.4)
DEPRECATED RDW RBC AUTO: 55.1 FL (ref 37–54)
EOSINOPHIL # BLD AUTO: 0.21 10*3/MM3 (ref 0–0.4)
EOSINOPHIL NFR BLD AUTO: 2.1 % (ref 0.3–6.2)
ERYTHROCYTE [DISTWIDTH] IN BLOOD BY AUTOMATED COUNT: 16.5 % (ref 12.3–15.4)
GFR SERPL CREATININE-BSD FRML MDRD: 25 ML/MIN/1.73
GLOBULIN UR ELPH-MCNC: 3.2 GM/DL
GLUCOSE BLD-MCNC: 92 MG/DL (ref 70–100)
HCT VFR BLD AUTO: 31.6 % (ref 34–46.6)
HGB BLD-MCNC: 10.3 G/DL (ref 12–15.9)
HOLD SPECIMEN: NORMAL
HOLD SPECIMEN: NORMAL
IMM GRANULOCYTES # BLD AUTO: 0.04 10*3/MM3 (ref 0–0.05)
IMM GRANULOCYTES NFR BLD AUTO: 0.4 % (ref 0–0.5)
LYMPHOCYTES # BLD AUTO: 1.99 10*3/MM3 (ref 0.7–3.1)
LYMPHOCYTES NFR BLD AUTO: 20.3 % (ref 19.6–45.3)
MCH RBC QN AUTO: 29.7 PG (ref 26.6–33)
MCHC RBC AUTO-ENTMCNC: 32.6 G/DL (ref 31.5–35.7)
MCV RBC AUTO: 91.1 FL (ref 79–97)
MONOCYTES # BLD AUTO: 0.63 10*3/MM3 (ref 0.1–0.9)
MONOCYTES NFR BLD AUTO: 6.4 % (ref 5–12)
NEUTROPHILS # BLD AUTO: 6.9 10*3/MM3 (ref 1.7–7)
NEUTROPHILS NFR BLD AUTO: 70.4 % (ref 42.7–76)
NRBC BLD AUTO-RTO: 0 /100 WBC (ref 0–0.2)
PLATELET # BLD AUTO: 230 10*3/MM3 (ref 140–450)
PMV BLD AUTO: 10.8 FL (ref 6–12)
POTASSIUM BLD-SCNC: 3.7 MMOL/L (ref 3.5–5.3)
PROT SERPL-MCNC: 6.8 G/DL (ref 6.3–8.7)
RBC # BLD AUTO: 3.47 10*6/MM3 (ref 3.77–5.28)
SODIUM BLD-SCNC: 142 MMOL/L (ref 135–145)
WBC NRBC COR # BLD: 9.81 10*3/MM3 (ref 3.4–10.8)

## 2019-09-09 PROCEDURE — 99214 OFFICE O/P EST MOD 30 MIN: CPT | Performed by: INTERNAL MEDICINE

## 2019-09-09 PROCEDURE — 85025 COMPLETE CBC W/AUTO DIFF WBC: CPT | Performed by: INTERNAL MEDICINE

## 2019-09-09 PROCEDURE — 36415 COLL VENOUS BLD VENIPUNCTURE: CPT

## 2019-09-09 PROCEDURE — 80053 COMPREHEN METABOLIC PANEL: CPT | Performed by: INTERNAL MEDICINE

## 2019-09-09 PROCEDURE — 25010000002 EPOETIN ALFA PER 1000 UNITS: Performed by: INTERNAL MEDICINE

## 2019-09-09 PROCEDURE — 96372 THER/PROPH/DIAG INJ SC/IM: CPT

## 2019-09-09 RX ADMIN — ERYTHROPOIETIN 40000 UNITS: 40000 INJECTION, SOLUTION INTRAVENOUS; SUBCUTANEOUS at 14:54

## 2019-09-09 NOTE — PROGRESS NOTES
[facility]  HEMATOLOGY & ONCOLOGY    Cancer Staging Information:  Cancer Staging  No matching staging information was found for the patient.      Subjective     VISIT DIAGNOSIS:   No diagnosis found.    REASON FOR VISIT:     Chief Complaint   Patient presents with   • Anemia     Here for followup, possible procrit        HEMATOLOGY / ONCOLOGY HISTORY:    No history exists.           INTERVAL HISTORY  Patient ID: Maria C Shrestha is a 59 y.o. year old female Cancer Staging    9/9/19: no issues today. Denies sob,cp,n/v/dizziness.occassional blood streaked stool from hemorrhoids.  --rest of ros unremarkable. Physical exam unremarkable.    Past Medical History:   Past Medical History:   Diagnosis Date   • Acquired hypothyroidism 2/6/2017   • Allergic    • Anemia    • Anxiety    • Arthritis    • Cellulitis    • Chronic kidney disease     3rd stage   • Depression    • Disease of thyroid gland    • Dyslipidemia    • Elevated cholesterol    • Essential hypertension    • Fatigue    • Gallbladder abscess    • Hearing loss    • Light headed    • Limited range of motion (ROM) of shoulder     right   • Obesity    • Palpitation    • Short of breath on exertion    • Sleep apnea    • Type 2 diabetes mellitus (CMS/HCC)    • Type II diabetes mellitus, uncontrolled (CMS/HCC)    • Wears glasses      Past Surgical History:   Past Surgical History:   Procedure Laterality Date   • ADENOIDECTOMY     • ANAL FISTULA REPAIR      x 2    • CHOLECYSTECTOMY     • COLONOSCOPY  01/02/2014   • COLONOSCOPY N/A 3/22/2017    Procedure: COLONOSCOPY WITH ANESTHESIA;  Surgeon: Mono Linder MD;  Location: [facility] ENDOSCOPY;  Service:    • D&C HYSTEROSCOPY N/A 7/25/2018    Procedure: DILATATION AND CURETTAGE HYSTEROSCOPY;  Surgeon: Michael Castaneda MD;  Location: [facility] OR;  Service: Obstetrics/Gynecology   • DILATATION AND CURETTAGE      X 2   • ENDOSCOPY     • TONSILLECTOMY       Social History:   Social History     Socioeconomic  History   • Marital status:      Spouse name: Not on file   • Number of children: Not on file   • Years of education: Not on file   • Highest education level: Not on file   Tobacco Use   • Smoking status: Never Smoker   • Smokeless tobacco: Never Used   Substance and Sexual Activity   • Alcohol use: No   • Drug use: No   • Sexual activity: No     Birth control/protection: Post-menopausal     Family History:   Family History   Problem Relation Age of Onset   • Diabetes Other    • Cancer Mother    • Cancer Father    • Heart disease Father    • Diabetes Father    • Obesity Father    • Stroke Father    • Cancer Maternal Grandmother    • Uterine cancer Maternal Grandmother    • Diabetes Maternal Grandfather    • Cancer Paternal Grandmother    • Colon cancer Paternal Grandmother    • Diabetes Paternal Grandfather    • Heart disease Paternal Grandfather    • No Known Problems Sister    • No Known Problems Brother    • No Known Problems Daughter    • No Known Problems Son    • No Known Problems Maternal Aunt    • No Known Problems Paternal Aunt    • BRCA 1/2 Neg Hx    • Breast cancer Neg Hx    • Endometrial cancer Neg Hx    • Ovarian cancer Neg Hx        Review of Systems   See HPI otherwise unremarkable.  Performance Status:  Asymptomatic    Medications:    Current Outpatient Medications   Medication Sig Dispense Refill   • allopurinol (ZYLOPRIM) 100 MG tablet Take 100 mg by mouth 2 (Two) Times a Day.     • amLODIPine (NORVASC) 5 MG tablet      • aspirin 81 MG tablet Take 81 mg by mouth Daily. Stop 7/22/2018     • busPIRone (BUSPAR) 10 MG tablet buspirone 10 mg tablet   Take 2 tablets twice a day by oral route as needed for 90 days.     • carvedilol (COREG) 3.125 MG tablet Take 3.125 mg by mouth 2 (Two) Times a Day.     • cetirizine (zyrTEC) 10 MG tablet Take 10 mg by mouth As Needed.     • Cholecalciferol (VITAMIN D3) 30634 units tablet Vitamin D2 50,000 unit capsule   Take 1 capsule every week by oral route.  "Sunday     • cinacalcet (SENSIPAR) 30 MG tablet Take 1 tablet by mouth Daily. 30 tablet 3   • citalopram (CeleXA) 20 MG tablet Take 20 mg by mouth Daily.     • diazePAM (VALIUM) 2 MG tablet Take 2 mg by mouth As Needed for Anxiety.     • Dulaglutide (TRULICITY) 1.5 MG/0.5ML solution pen-injector Inject 1.5 mg under the skin into the appropriate area as directed Every 7 (Seven) Days. 6 mL 3   • fluticasone (VERAMYST) 27.5 MCG/SPRAY nasal spray 2 sprays into each nostril Daily.     • Glucose Blood (BLOOD GLUCOSE TEST) strip Use 4 x daily, use any brand covered by insurance or same brand as before 360 each 3   • ibuprofen (ADVIL,MOTRIN) 800 MG tablet Take 800 mg by mouth Every 8 (Eight) Hours As Needed for Mild Pain .     • insulin glargine (LANTUS) 100 UNIT/ML injection INJECT 200 UNITS DAILY 180 mL 3   • Insulin Lispro (HUMALOG KWIKPEN) 200 UNIT/ML solution pen-injector Inject 30 Units under the skin into the appropriate area as directed 3 (Three) Times a Day With Meals. 5 pen 11   • Insulin Pen Needle (PEN NEEDLES) 31G X 6 MM misc Use to inject insulin 4 times daily. 200 each 11   • Insulin Syringe 31G X 5/16\" 1 ML misc 4 x daily 120 each 11   • lamoTRIgine (LaMICtal) 50 MG tablet dispersible disintegrating tablet Take 50 mg by mouth Every Night.     • Lancets (FREESTYLE) lancets FOUR TIMES A  each 11   • levothyroxine (SYNTHROID) 75 MCG tablet Take 1 tablet by mouth Daily. 30 tablet 11   • lisinopril (PRINIVIL,ZESTRIL) 20 MG tablet Take 20 mg by mouth 2 (Two) Times a Day.     • Melatonin 10 MG tablet Take  by mouth.     • Multiple Vitamins-Minerals (MULTIVITAMIN ADULT PO) Take 1 tablet by mouth Daily.     • potassium gluconate 595 (99 K) MG tablet tablet potassium gluconate 595 mg (99 mg) tablet   Take 2 tablets every day by oral route.     • rosuvastatin (CRESTOR) 10 MG tablet Take 10 mg by mouth Daily.     • sodium bicarbonate 650 MG tablet Take 650 mg by mouth 3 (Three) Times a Day.     • traMADol " "(ULTRAM) 50 MG tablet Take 1 tablet by mouth Every 6 (Six) Hours As Needed for Moderate Pain . 8 tablet 0     No current facility-administered medications for this visit.        ALLERGIES:    Allergies   Allergen Reactions   • Codeine Nausea Only       Objective      Vitals:    09/09/19 1308   BP: 162/78   Pulse: 68   Resp: 16   Temp: 97.6 °F (36.4 °C)   TempSrc: Temporal   SpO2: 98%   Weight: (!) 178 kg (392 lb 8 oz)   Height: 170.2 cm (67\")   PainSc:   2   PainLoc: Knee         Current Status 6/5/2019   ECOG score 3         Physical Examremains the same  General Appearance:obese female.  Patient is awake, alert, oriented and in no acute distress. Patient is welldeveloped, wellnourished, and appears stated age.  HEENT: Normocephalic. Sclerae clear, conjunctiva pink, extraocular movements intact, pupils, round, reactive to light and  accommodation. Mouth and throat are clear with moist oral mucosa.  NECK: Supple, no jugular venous distention, thyroid not enlarged.  LYMPH: No cervical, supraclavicular, axillary, or inguinal lymphadenopathy.  CHEST: Equal bilateral expansion, AP  diameter normal, resonant percussion note  LUNGS: Good air movement, no rales, rhonchi, rubs or wheezes with auscultation  CARDIO: Regular sinus rhythm, no murmurs, gallops or rubs.  ABDOMEN: obese abdomen. Nondistended, soft, No tenderness, no guarding, no rebound, No hepatosplenomegaly. No abdominal masses. Bowel sounds positive. No hernia  GENITALIA: Not examined.  BREASTS: Not examined.  MUSKEL: No joint swelling, decreased motion, or inflammation  EXTREMS: kristie YAZAN. No clubbing, cyanosis, No varicose veins.  NEURO: Grossly nonfocal, Gait is coordinated and smooth, Cognition is preserved.  SKIN: No rashes, no ecchymoses, no petechia.  PSYCH: Oriented to time, place and person. Memory is preserved. Mood and affect appear normal  RECENT LABS:  Lab on 09/09/2019   Component Date Value Ref Range Status   • Glucose 09/09/2019 92  70 - 100 mg/dL " Final   • BUN 09/09/2019 38* 5 - 21 mg/dL Final   • Creatinine 09/09/2019 2.07* 0.50 - 1.40 mg/dL Final   • Sodium 09/09/2019 142  135 - 145 mmol/L Final   • Potassium 09/09/2019 3.7  3.5 - 5.3 mmol/L Final   • Chloride 09/09/2019 109  98 - 110 mmol/L Final   • CO2 09/09/2019 25.0  24.0 - 31.0 mmol/L Final   • Calcium 09/09/2019 8.1* 8.4 - 10.4 mg/dL Final   • Total Protein 09/09/2019 6.8  6.3 - 8.7 g/dL Final   • Albumin 09/09/2019 3.60  3.50 - 5.00 g/dL Final   • ALT (SGPT) 09/09/2019 17  0 - 54 U/L Final   • AST (SGOT) 09/09/2019 21  7 - 45 U/L Final   • Alkaline Phosphatase 09/09/2019 71  24 - 120 U/L Final   • Total Bilirubin 09/09/2019 0.3  0.1 - 1.0 mg/dL Final   • eGFR Non African Amer 09/09/2019 25* >60 mL/min/1.73 Final   • Globulin 09/09/2019 3.2  gm/dL Final   • A/G Ratio 09/09/2019 1.1  1.1 - 2.5 g/dL Final   • BUN/Creatinine Ratio 09/09/2019 18.4  7.0 - 25.0 Final   • Anion Gap 09/09/2019 8.0  4.0 - 13.0 mmol/L Final   • WBC 09/09/2019 9.81  3.40 - 10.80 10*3/mm3 Final   • RBC 09/09/2019 3.47* 3.77 - 5.28 10*6/mm3 Final   • Hemoglobin 09/09/2019 10.3* 12.0 - 15.9 g/dL Final   • Hematocrit 09/09/2019 31.6* 34.0 - 46.6 % Final   • MCV 09/09/2019 91.1  79.0 - 97.0 fL Final   • MCH 09/09/2019 29.7  26.6 - 33.0 pg Final   • MCHC 09/09/2019 32.6  31.5 - 35.7 g/dL Final   • RDW 09/09/2019 16.5* 12.3 - 15.4 % Final   • RDW-SD 09/09/2019 55.1* 37.0 - 54.0 fl Final   • MPV 09/09/2019 10.8  6.0 - 12.0 fL Final   • Platelets 09/09/2019 230  140 - 450 10*3/mm3 Final   • Neutrophil % 09/09/2019 70.4  42.7 - 76.0 % Final   • Lymphocyte % 09/09/2019 20.3  19.6 - 45.3 % Final   • Monocyte % 09/09/2019 6.4  5.0 - 12.0 % Final   • Eosinophil % 09/09/2019 2.1  0.3 - 6.2 % Final   • Basophil % 09/09/2019 0.4  0.0 - 1.5 % Final   • Immature Grans % 09/09/2019 0.4  0.0 - 0.5 % Final   • Neutrophils, Absolute 09/09/2019 6.90  1.70 - 7.00 10*3/mm3 Final   • Lymphocytes, Absolute 09/09/2019 1.99  0.70 - 3.10 10*3/mm3 Final   •  Monocytes, Absolute 09/09/2019 0.63  0.10 - 0.90 10*3/mm3 Final   • Eosinophils, Absolute 09/09/2019 0.21  0.00 - 0.40 10*3/mm3 Final   • Basophils, Absolute 09/09/2019 0.04  0.00 - 0.20 10*3/mm3 Final   • Immature Grans, Absolute 09/09/2019 0.04  0.00 - 0.05 10*3/mm3 Final   • nRBC 09/09/2019 0.0  0.0 - 0.2 /100 WBC Final   Lab on 09/04/2019   Component Date Value Ref Range Status   • PTH, Intact 09/04/2019 38.3  7.5 - 53.5 pg/mL Final   • Calcium 09/04/2019 8.8  8.4 - 10.4 mg/dL Final       RADIOLOGY:  No results found.         Assessment/Plan  Maria C Shrestha is a 59 y.o. year old female  Whom we are seeing for AoCKD on procrit that is stable.    Patient Active Problem List   Diagnosis   • Type 2 diabetes mellitus with stage 3 chronic kidney disease, with long-term current use of insulin (CMS/[facility])   • Mixed diabetic hyperlipidemia associated with type 2 diabetes mellitus (CMS/[facility])   • Hypertension associated with diabetes (CMS/[facility])   • Vitamin D deficiency   • B12 deficiency   • Anemia in CKD (chronic kidney disease)   • Acquired hypothyroidism   • Chronic kidney disease, stage 4 (severe) (CMS/[facility])          1. AoCKD: s/p iron infusions. Hg of 10.3. Ok for procrit shot today  -iron 77, %sat 28. Iron not indicated      2. HTN: on amlodipine, carvedilol, lisinopril    3.DM: on insulin    4.Hypothyroidism: on synthroid  5. Anxiety/depression: on diazepam, celexa  6. Hyperlipidemia: on crestor.  7. CKD stage 4: sees renal. grf 26        Rodrigo Huitron MD    9/9/2019    2:08 PM

## 2019-09-11 ENCOUNTER — OFFICE VISIT (OUTPATIENT)
Dept: ENDOCRINOLOGY | Facility: CLINIC | Age: 59
End: 2019-09-11

## 2019-09-11 VITALS
HEIGHT: 67 IN | SYSTOLIC BLOOD PRESSURE: 148 MMHG | BODY MASS INDEX: 45.99 KG/M2 | OXYGEN SATURATION: 94 % | WEIGHT: 293 LBS | DIASTOLIC BLOOD PRESSURE: 80 MMHG | HEART RATE: 83 BPM

## 2019-09-11 DIAGNOSIS — E11.69 MIXED DIABETIC HYPERLIPIDEMIA ASSOCIATED WITH TYPE 2 DIABETES MELLITUS (HCC): ICD-10-CM

## 2019-09-11 DIAGNOSIS — I15.2 HYPERTENSION ASSOCIATED WITH DIABETES (HCC): ICD-10-CM

## 2019-09-11 DIAGNOSIS — E53.8 B12 DEFICIENCY: ICD-10-CM

## 2019-09-11 DIAGNOSIS — N18.30 TYPE 2 DIABETES MELLITUS WITH STAGE 3 CHRONIC KIDNEY DISEASE, WITH LONG-TERM CURRENT USE OF INSULIN (HCC): Primary | ICD-10-CM

## 2019-09-11 DIAGNOSIS — E83.52 HYPERCALCEMIA: ICD-10-CM

## 2019-09-11 DIAGNOSIS — E55.9 VITAMIN D DEFICIENCY: ICD-10-CM

## 2019-09-11 DIAGNOSIS — E11.59 HYPERTENSION ASSOCIATED WITH DIABETES (HCC): ICD-10-CM

## 2019-09-11 DIAGNOSIS — Z79.4 TYPE 2 DIABETES MELLITUS WITH STAGE 3 CHRONIC KIDNEY DISEASE, WITH LONG-TERM CURRENT USE OF INSULIN (HCC): Primary | ICD-10-CM

## 2019-09-11 DIAGNOSIS — E78.2 MIXED DIABETIC HYPERLIPIDEMIA ASSOCIATED WITH TYPE 2 DIABETES MELLITUS (HCC): ICD-10-CM

## 2019-09-11 DIAGNOSIS — E11.22 TYPE 2 DIABETES MELLITUS WITH STAGE 3 CHRONIC KIDNEY DISEASE, WITH LONG-TERM CURRENT USE OF INSULIN (HCC): Primary | ICD-10-CM

## 2019-09-11 PROCEDURE — 99214 OFFICE O/P EST MOD 30 MIN: CPT | Performed by: INTERNAL MEDICINE

## 2019-09-11 PROCEDURE — 95249 CONT GLUC MNTR PT PROV EQP: CPT | Performed by: INTERNAL MEDICINE

## 2019-09-11 NOTE — PROGRESS NOTES
Maria C Shrestha is a 59 y.o. female who presents for  evaluation of   Chief Complaint   Patient presents with   • Diabetes         Primary Care / Referring Provider  Ole Soliman MD    History of Present Illness  Duration/Timing:  Diabetes mellitus type 2  timing constant    quality controlled     severity high     Severity (Complications/Hospitalizations)  Secondary Microvascular Complications:  Diabetic Nephropathy, No Diabetic Neuropathy      Context  Diabetes Regimen:  Insulin, Compliant with regimen  Blood Glucose Readings  Mostly at goal but frequent hypoglycemia since starting keto diet     Diet    counts carbs, eating only 45 g cho per meal     Exercise:  Does not exercise    Associated Signs/Symptoms  Hyperglycemic Symptoms:  No polyuria, No polydipsia, No polyphagia, Weight loss with keto diet   Hypoglycemic Episodes:  No documented hypoglycemia    Past Medical History:   Diagnosis Date   • Acquired hypothyroidism 2/6/2017   • Allergic    • Anemia    • Anxiety    • Arthritis    • Cellulitis    • Chronic kidney disease     3rd stage   • Depression    • Disease of thyroid gland    • Dyslipidemia    • Elevated cholesterol    • Essential hypertension    • Fatigue    • Gallbladder abscess    • Hearing loss    • Light headed    • Limited range of motion (ROM) of shoulder     right   • Obesity    • Palpitation    • Short of breath on exertion    • Sleep apnea    • Type 2 diabetes mellitus (CMS/HCC)    • Type II diabetes mellitus, uncontrolled (CMS/HCC)    • Wears glasses      Family History   Problem Relation Age of Onset   • Diabetes Other    • Cancer Mother    • Cancer Father    • Heart disease Father    • Diabetes Father    • Obesity Father    • Stroke Father    • Cancer Maternal Grandmother    • Uterine cancer Maternal Grandmother    • Diabetes Maternal Grandfather    • Cancer Paternal Grandmother    • Colon cancer Paternal Grandmother    • Diabetes Paternal Grandfather    • Heart disease Paternal  Grandfather    • No Known Problems Sister    • No Known Problems Brother    • No Known Problems Daughter    • No Known Problems Son    • No Known Problems Maternal Aunt    • No Known Problems Paternal Aunt    • BRCA 1/2 Neg Hx    • Breast cancer Neg Hx    • Endometrial cancer Neg Hx    • Ovarian cancer Neg Hx      Social History     Tobacco Use   • Smoking status: Never Smoker   • Smokeless tobacco: Never Used   Substance Use Topics   • Alcohol use: No   • Drug use: No         Current Outpatient Medications:   •  allopurinol (ZYLOPRIM) 100 MG tablet, Take 100 mg by mouth 2 (Two) Times a Day., Disp: , Rfl:   •  amLODIPine (NORVASC) 5 MG tablet, , Disp: , Rfl:   •  aspirin 81 MG tablet, Take 81 mg by mouth Daily. Stop 7/22/2018, Disp: , Rfl:   •  busPIRone (BUSPAR) 10 MG tablet, buspirone 10 mg tablet  Take 2 tablets twice a day by oral route as needed for 90 days., Disp: , Rfl:   •  carvedilol (COREG) 3.125 MG tablet, Take 3.125 mg by mouth 2 (Two) Times a Day., Disp: , Rfl:   •  cetirizine (zyrTEC) 10 MG tablet, Take 10 mg by mouth As Needed., Disp: , Rfl:   •  Cholecalciferol (VITAMIN D3) 84261 units tablet, Vitamin D2 50,000 unit capsule  Take 1 capsule every week by oral route. Sunday, Disp: , Rfl:   •  cinacalcet (SENSIPAR) 30 MG tablet, Take 1 tablet by mouth Daily., Disp: 30 tablet, Rfl: 3  •  citalopram (CeleXA) 20 MG tablet, Take 20 mg by mouth Daily., Disp: , Rfl:   •  diazePAM (VALIUM) 2 MG tablet, Take 2 mg by mouth As Needed for Anxiety., Disp: , Rfl:   •  Dulaglutide (TRULICITY) 1.5 MG/0.5ML solution pen-injector, Inject 1.5 mg under the skin into the appropriate area as directed Every 7 (Seven) Days., Disp: 6 mL, Rfl: 3  •  fluticasone (VERAMYST) 27.5 MCG/SPRAY nasal spray, 2 sprays into each nostril Daily., Disp: , Rfl:   •  Glucose Blood (BLOOD GLUCOSE TEST) strip, Use 4 x daily, use any brand covered by insurance or same brand as before, Disp: 360 each, Rfl: 3  •  ibuprofen (ADVIL,MOTRIN) 800 MG  "tablet, Take 800 mg by mouth Every 8 (Eight) Hours As Needed for Mild Pain ., Disp: , Rfl:   •  insulin glargine (LANTUS) 100 UNIT/ML injection, INJECT 200 UNITS DAILY, Disp: 180 mL, Rfl: 3  •  Insulin Lispro (HUMALOG KWIKPEN) 200 UNIT/ML solution pen-injector, Inject 30 Units under the skin into the appropriate area as directed 3 (Three) Times a Day With Meals., Disp: 5 pen, Rfl: 11  •  Insulin Pen Needle (PEN NEEDLES) 31G X 6 MM misc, Use to inject insulin 4 times daily., Disp: 200 each, Rfl: 11  •  Insulin Syringe 31G X 5/16\" 1 ML misc, 4 x daily, Disp: 120 each, Rfl: 11  •  lamoTRIgine (LaMICtal) 50 MG tablet dispersible disintegrating tablet, Take 50 mg by mouth Every Night., Disp: , Rfl:   •  Lancets (FREESTYLE) lancets, FOUR TIMES A DAY, Disp: 150 each, Rfl: 11  •  levothyroxine (SYNTHROID) 75 MCG tablet, Take 1 tablet by mouth Daily., Disp: 30 tablet, Rfl: 11  •  lisinopril (PRINIVIL,ZESTRIL) 20 MG tablet, Take 20 mg by mouth 2 (Two) Times a Day., Disp: , Rfl:   •  Melatonin 10 MG tablet, Take  by mouth., Disp: , Rfl:   •  Multiple Vitamins-Minerals (MULTIVITAMIN ADULT PO), Take 1 tablet by mouth Daily., Disp: , Rfl:   •  potassium gluconate 595 (99 K) MG tablet tablet, potassium gluconate 595 mg (99 mg) tablet  Take 2 tablets every day by oral route., Disp: , Rfl:   •  rosuvastatin (CRESTOR) 10 MG tablet, Take 10 mg by mouth Daily., Disp: , Rfl:   •  sodium bicarbonate 650 MG tablet, Take 650 mg by mouth 3 (Three) Times a Day., Disp: , Rfl:   •  traMADol (ULTRAM) 50 MG tablet, Take 1 tablet by mouth Every 6 (Six) Hours As Needed for Moderate Pain ., Disp: 8 tablet, Rfl: 0    Review of Systems    Review of Systems   Constitutional: Positive for unexpected weight change. Negative for activity change, appetite change, chills, diaphoresis, fatigue and fever.   HENT: Negative for congestion, drooling, ear discharge, ear pain, facial swelling, mouth sores, nosebleeds, postnasal drip, sinus pressure, sneezing, " "sore throat, tinnitus, trouble swallowing and voice change.    Eyes: Negative.  Negative for photophobia, pain, discharge, redness and itching.   Respiratory: Negative.  Negative for apnea, cough, choking, chest tightness, shortness of breath, wheezing and stridor.    Cardiovascular: Negative.  Negative for chest pain, palpitations and leg swelling.   Gastrointestinal: Negative.  Negative for abdominal distention, abdominal pain, constipation, diarrhea, nausea and vomiting.   Endocrine: Positive for polydipsia, polyphagia and polyuria. Negative for cold intolerance and heat intolerance.   Genitourinary: Negative for difficulty urinating, dysuria, flank pain and frequency.   Musculoskeletal: Positive for arthralgias, back pain and myalgias. Negative for gait problem, joint swelling, neck pain and neck stiffness.   Skin: Negative for color change, pallor, rash and wound.   Allergic/Immunologic: Negative for environmental allergies, food allergies and immunocompromised state.   Neurological: Negative for dizziness, tremors, seizures, syncope, facial asymmetry, speech difficulty, weakness, light-headedness, numbness and headaches.   Hematological: Negative for adenopathy. Does not bruise/bleed easily.   Psychiatric/Behavioral: Negative for agitation, behavioral problems, confusion, decreased concentration, dysphoric mood, hallucinations, self-injury, sleep disturbance and suicidal ideas. The patient is not nervous/anxious and is not hyperactive.         Objective:     /80   Pulse 83   Ht 170.2 cm (67\")   Wt (!) 178 kg (393 lb 8 oz)   LMP  (LMP Unknown)   SpO2 94%   BMI 61.63 kg/m²     Physical Exam   Constitutional: She is oriented to person, place, and time. She appears well-developed.   HENT:   Head: Normocephalic.   Right Ear: External ear normal.   Left Ear: External ear normal.   Nose: Nose normal.   Eyes: Conjunctivae and EOM are normal. No scleral icterus.   Neck: Normal range of motion. Neck supple. " No tracheal deviation present. No thyromegaly present.   Cardiovascular: Normal rate, regular rhythm and intact distal pulses. Exam reveals no gallop and no friction rub.   Murmur heard.  Systolic murmur, carotid bruit    Pulmonary/Chest: Effort normal and breath sounds normal. No stridor. No respiratory distress. She has no wheezes. She has no rales. She exhibits no tenderness.   Abdominal: Soft. Bowel sounds are normal. She exhibits no distension and no mass. There is no tenderness. There is no rebound and no guarding.   Musculoskeletal: Normal range of motion. She exhibits no tenderness or deformity.   Lymphadenopathy:     She has no cervical adenopathy.   Neurological: She is alert and oriented to person, place, and time. She displays normal reflexes. She exhibits normal muscle tone. Coordination normal.   Skin: No rash noted. No erythema. No pallor.   Psychiatric: She has a normal mood and affect. Her behavior is normal. Judgment and thought content normal.       Lab Review            Assessment/Plan       ICD-10-CM ICD-9-CM   1. Type 2 diabetes mellitus with stage 3 chronic kidney disease, with long-term current use of insulin (CMS/HCC) E11.22 250.40    N18.3 585.3    Z79.4 V58.67   2. Mixed diabetic hyperlipidemia associated with type 2 diabetes mellitus (CMS/[facility]) E11.69 250.80    E78.2 272.2   3. Hypertension associated with diabetes (CMS/[facility]) E11.59 250.80    I10 401.9   4. B12 deficiency E53.8 266.2   5. Vitamin D deficiency E55.9 268.9   6. Hypercalcemia E83.52 275.42       Glycemic Management:   Lab Results   Component Value Date    HGBA1C 6.7 (H) 09/04/2019    HGBA1C 6.9 05/06/2019    HGBA1C 7.0 01/24/2019     Lab Results   Component Value Date    GLUCOSE 92 09/09/2019    BUN 38 (H) 09/09/2019    CREATININE 2.07 (H) 09/09/2019    EGFRIFNONA 25 (L) 09/09/2019    BCR 18.4 09/09/2019    CO2 25.0 09/09/2019    CALCIUM 8.1 (L) 09/09/2019    ALBUMIN 3.60 09/09/2019    AST 21 09/09/2019    ALT 17 09/09/2019      Lab Results   Component Value Date    WBC 9.81 09/09/2019    HGB 10.3 (L) 09/09/2019    HCT 31.6 (L) 09/09/2019    MCV 91.1 09/09/2019     09/09/2019     Lab Results   Component Value Date    CREATININE 2.07 (H) 09/09/2019    CREATININE 2.17 (H) 09/04/2019    CREATININE 1.96 (H) 08/07/2019    CREATININE 2.13 (H) 07/03/2019    CREATININE 2.12 (H) 06/05/2019          Trulicity 1.5 mg weekly       Humulin U 500 before     Lantus 160 at night     invokana , I wanted to add but GFR less than 30     Will give rx for u200 humalog up to 20-40 units with meals       avg deidra of 117 from August 29 to Sept 11   No changes     92% at goal , 1% low          Lipid Management    Lab Results   Component Value Date    TRIG 374 (H) 09/04/2019    TRIG 274 (H) 05/06/2019    TRIG 258 (H) 01/24/2019     Lab Results   Component Value Date    HDL 35 (L) 09/04/2019    HDL 37 (L) 05/06/2019    HDL 34 (L) 01/24/2019     No components found for: LDLCALC  Lab Results   Component Value Date    LDL 70 09/04/2019    LDL 73 05/06/2019    LDL 83 01/24/2019     Lab Results   Component Value Date    LDL 70 09/04/2019    LDL 73 05/06/2019    LDL 83 01/24/2019         on Crestor 20 mg daily   Generic causing myalgias    Return to brand name       Blood Pressure Management:    Vitals:    09/11/19 1120   BP: 148/80   Pulse: 83   SpO2: 94%         Per nephrology       Microvascular Complication Monitoring:  No Microalbuminuria, No Diabetic Retinopathy, Date of last eye exam 07/18/2016, No Diabetic Neuropathy  on ACE i     ckd stage IV    followed by nephrology     I suggest that she doesn't consume more than 140 g protein per day   Plant better than animal but restricted on keto     --      Lab Results   Component Value Date    CREATININE 2.07 (H) 09/09/2019    BUN 38 (H) 09/09/2019     09/09/2019    K 3.7 09/09/2019     09/09/2019    CO2 25.0 09/09/2019         Lab Results   Component Value Date    CREATININE 2.07 (H) 09/09/2019     CREATININE 2.17 (H) 09/04/2019    CREATININE 1.96 (H) 08/07/2019         Immunizations:  Last pneumococcal immunization pneumovax before age 65     Flu Shot will have in Mid Nov 2016       Preventive Care:  No smoking      Weight Related:   Wt Readings from Last 3 Encounters:   09/11/19 (!) 178 kg (393 lb 8 oz)   09/09/19 (!) 179 kg (394 lb)   09/09/19 (!) 178 kg (392 lb 8 oz)     Body mass index is 61.63 kg/m².      prefers no bariatric surgery    Now on cpap       Bone Health    Lab Results   Component Value Date    PTH 38.3 09/04/2019    CALCIUM 8.1 (L) 09/09/2019     For JARETH     Was having hypercalcemia with rocatrol 0.25 three times weekly and on calcium    Now on sensipar 30 mg daily but without calcium    Start calcium low dose 400 mg daily     DXA No 2018, osteopenia left femoral neck     Monitor PTH , no more than 150 , no less than 60       Thyroid Health  Lab Results   Component Value Date    TSH 2.570 09/04/2019     On levothyroxine 50 ug daily - increase to 75 mcgs daily     Other Diabetes Related Aspects       Lab Results   Component Value Date    UJMLTUSJ14 618 09/04/2019     Lab Results   Component Value Date    WBC 9.81 09/09/2019    HGB 10.3 (L) 09/09/2019    HCT 31.6 (L) 09/09/2019    MCV 91.1 09/09/2019     09/09/2019     Lab Results   Component Value Date    IRON 77 08/07/2019    TIBC 276 08/07/2019    FERRITIN 90.10 08/07/2019       Anemia , managed by nephrology   Deciding vs epo vs iron   But now   DUB, may need hysterectomy       Systolic Murmur, AS? Echo   Could be due to anemia     Echo and carotid US from 7-17 , normal echo and less than 50% blockage in carotids    I reviewed and summarized records from Ole oSliman MD from present year  and I reviewed / ordered labs.     No orders of the defined types were placed in this encounter.        A copy of my note was sent to Ole Soliman MD    Please see my above opinion and suggestions.

## 2019-10-09 ENCOUNTER — INFUSION (OUTPATIENT)
Dept: ONCOLOGY | Facility: HOSPITAL | Age: 59
End: 2019-10-09

## 2019-10-09 ENCOUNTER — LAB (OUTPATIENT)
Dept: LAB | Facility: HOSPITAL | Age: 59
End: 2019-10-09

## 2019-10-09 ENCOUNTER — OFFICE VISIT (OUTPATIENT)
Dept: ONCOLOGY | Facility: CLINIC | Age: 59
End: 2019-10-09

## 2019-10-09 VITALS
WEIGHT: 293 LBS | DIASTOLIC BLOOD PRESSURE: 64 MMHG | TEMPERATURE: 99.3 F | HEART RATE: 75 BPM | OXYGEN SATURATION: 99 % | SYSTOLIC BLOOD PRESSURE: 198 MMHG | RESPIRATION RATE: 20 BRPM | HEIGHT: 67 IN | BODY MASS INDEX: 45.99 KG/M2

## 2019-10-09 VITALS
HEIGHT: 67 IN | WEIGHT: 293 LBS | RESPIRATION RATE: 16 BRPM | BODY MASS INDEX: 45.99 KG/M2 | DIASTOLIC BLOOD PRESSURE: 62 MMHG | OXYGEN SATURATION: 98 % | HEART RATE: 77 BPM | TEMPERATURE: 98.2 F | SYSTOLIC BLOOD PRESSURE: 128 MMHG

## 2019-10-09 DIAGNOSIS — D63.1 ANEMIA IN STAGE 3 CHRONIC KIDNEY DISEASE (HCC): ICD-10-CM

## 2019-10-09 DIAGNOSIS — D63.1 ANEMIA IN STAGE 3 CHRONIC KIDNEY DISEASE (HCC): Primary | ICD-10-CM

## 2019-10-09 DIAGNOSIS — N18.30 ANEMIA IN STAGE 3 CHRONIC KIDNEY DISEASE (HCC): ICD-10-CM

## 2019-10-09 DIAGNOSIS — N18.30 ANEMIA IN STAGE 3 CHRONIC KIDNEY DISEASE (HCC): Primary | ICD-10-CM

## 2019-10-09 PROBLEM — E55.9 VITAMIN D DEFICIENCY: Status: ACTIVE | Noted: 2017-02-26

## 2019-10-09 PROBLEM — N18.9 ANEMIA OF CHRONIC RENAL FAILURE: Status: ACTIVE | Noted: 2019-10-09

## 2019-10-09 PROBLEM — I10 HYPERTENSION: Status: ACTIVE | Noted: 2017-02-26

## 2019-10-09 PROBLEM — E11.22 TYPE 2 DM WITH CKD STAGE 3 AND HYPERTENSION: Status: ACTIVE | Noted: 2017-02-26

## 2019-10-09 PROBLEM — E83.51 HYPOCALCEMIA: Status: ACTIVE | Noted: 2017-02-26

## 2019-10-09 PROBLEM — K76.9 DISEASE OF LIVER: Status: ACTIVE | Noted: 2019-10-09

## 2019-10-09 PROBLEM — I82.409 DEEP VENOUS THROMBOSIS OF LOWER EXTREMITY: Status: ACTIVE | Noted: 2019-10-09

## 2019-10-09 PROBLEM — N17.9 ACUTE RENAL FAILURE SUPERIMPOSED ON STAGE 3 CHRONIC KIDNEY DISEASE: Status: ACTIVE | Noted: 2017-02-26

## 2019-10-09 PROBLEM — E07.9 THYROID DISEASE: Status: ACTIVE | Noted: 2017-02-26

## 2019-10-09 PROBLEM — G47.30 SLEEP APNEA: Status: ACTIVE | Noted: 2017-02-26

## 2019-10-09 PROBLEM — D64.9 ANEMIA: Status: ACTIVE | Noted: 2017-02-26

## 2019-10-09 PROBLEM — F39 MOOD DISORDER: Status: ACTIVE | Noted: 2017-02-26

## 2019-10-09 PROBLEM — I12.9 TYPE 2 DM WITH CKD STAGE 3 AND HYPERTENSION: Status: ACTIVE | Noted: 2017-02-26

## 2019-10-09 PROBLEM — F41.9 ANXIETY: Status: ACTIVE | Noted: 2017-02-26

## 2019-10-09 PROBLEM — E03.9 HYPOTHYROIDISM: Status: ACTIVE | Noted: 2017-03-08

## 2019-10-09 PROBLEM — E11.9 DIABETES MELLITUS: Status: ACTIVE | Noted: 2019-10-09

## 2019-10-09 PROBLEM — E78.1 HYPERTRIGLYCERIDEMIA: Status: ACTIVE | Noted: 2017-02-26

## 2019-10-09 LAB
ALBUMIN SERPL-MCNC: 3.6 G/DL (ref 3.5–5.2)
ALBUMIN/GLOB SERPL: 1.1 G/DL
ALP SERPL-CCNC: 71 U/L (ref 39–117)
ALT SERPL W P-5'-P-CCNC: 10 U/L (ref 1–33)
ANION GAP SERPL CALCULATED.3IONS-SCNC: 11 MMOL/L (ref 5–15)
AST SERPL-CCNC: 12 U/L (ref 1–32)
BASOPHILS # BLD AUTO: 0.04 10*3/MM3 (ref 0–0.2)
BASOPHILS NFR BLD AUTO: 0.4 % (ref 0–1.5)
BILIRUB SERPL-MCNC: 0.2 MG/DL (ref 0.2–1.2)
BUN BLD-MCNC: 28 MG/DL (ref 6–20)
BUN/CREAT SERPL: 14.8 (ref 7–25)
CALCIUM SPEC-SCNC: 9 MG/DL (ref 8.6–10.5)
CHLORIDE SERPL-SCNC: 101 MMOL/L (ref 98–107)
CO2 SERPL-SCNC: 28 MMOL/L (ref 22–29)
CREAT BLD-MCNC: 1.89 MG/DL (ref 0.57–1)
DEPRECATED RDW RBC AUTO: 53.2 FL (ref 37–54)
EOSINOPHIL # BLD AUTO: 0.22 10*3/MM3 (ref 0–0.4)
EOSINOPHIL NFR BLD AUTO: 2.2 % (ref 0.3–6.2)
ERYTHROCYTE [DISTWIDTH] IN BLOOD BY AUTOMATED COUNT: 16.1 % (ref 12.3–15.4)
GFR SERPL CREATININE-BSD FRML MDRD: 27 ML/MIN/1.73
GLOBULIN UR ELPH-MCNC: 3.2 GM/DL
GLUCOSE BLD-MCNC: 129 MG/DL (ref 65–99)
HCT VFR BLD AUTO: 32.5 % (ref 34–46.6)
HGB BLD-MCNC: 10.6 G/DL (ref 12–15.9)
HOLD SPECIMEN: NORMAL
IMM GRANULOCYTES # BLD AUTO: 0.06 10*3/MM3 (ref 0–0.05)
IMM GRANULOCYTES NFR BLD AUTO: 0.6 % (ref 0–0.5)
LYMPHOCYTES # BLD AUTO: 2.06 10*3/MM3 (ref 0.7–3.1)
LYMPHOCYTES NFR BLD AUTO: 20.5 % (ref 19.6–45.3)
MCH RBC QN AUTO: 29.7 PG (ref 26.6–33)
MCHC RBC AUTO-ENTMCNC: 32.6 G/DL (ref 31.5–35.7)
MCV RBC AUTO: 91 FL (ref 79–97)
MONOCYTES # BLD AUTO: 0.6 10*3/MM3 (ref 0.1–0.9)
MONOCYTES NFR BLD AUTO: 6 % (ref 5–12)
NEUTROPHILS # BLD AUTO: 7.05 10*3/MM3 (ref 1.7–7)
NEUTROPHILS NFR BLD AUTO: 70.3 % (ref 42.7–76)
NRBC BLD AUTO-RTO: 0 /100 WBC (ref 0–0.2)
PLATELET # BLD AUTO: 237 10*3/MM3 (ref 140–450)
PMV BLD AUTO: 10.9 FL (ref 6–12)
POTASSIUM BLD-SCNC: 4.2 MMOL/L (ref 3.5–5.2)
PROT SERPL-MCNC: 6.8 G/DL (ref 6–8.5)
RBC # BLD AUTO: 3.57 10*6/MM3 (ref 3.77–5.28)
SODIUM BLD-SCNC: 140 MMOL/L (ref 136–145)
WBC NRBC COR # BLD: 10.03 10*3/MM3 (ref 3.4–10.8)

## 2019-10-09 PROCEDURE — 96372 THER/PROPH/DIAG INJ SC/IM: CPT

## 2019-10-09 PROCEDURE — 36415 COLL VENOUS BLD VENIPUNCTURE: CPT

## 2019-10-09 PROCEDURE — 25010000002 EPOETIN ALFA PER 1000 UNITS: Performed by: INTERNAL MEDICINE

## 2019-10-09 PROCEDURE — 85025 COMPLETE CBC W/AUTO DIFF WBC: CPT | Performed by: INTERNAL MEDICINE

## 2019-10-09 PROCEDURE — 80053 COMPREHEN METABOLIC PANEL: CPT | Performed by: INTERNAL MEDICINE

## 2019-10-09 PROCEDURE — 99214 OFFICE O/P EST MOD 30 MIN: CPT | Performed by: INTERNAL MEDICINE

## 2019-10-09 RX ADMIN — ERYTHROPOIETIN 40000 UNITS: 40000 INJECTION, SOLUTION INTRAVENOUS; SUBCUTANEOUS at 14:36

## 2019-10-09 NOTE — PROGRESS NOTES
[facility]  HEMATOLOGY & ONCOLOGY    Cancer Staging Information:  Cancer Staging  No matching staging information was found for the patient.      Subjective     VISIT DIAGNOSIS:   Encounter Diagnosis   Name Primary?   • Anemia in stage 3 chronic kidney disease (CMS/HCC)        REASON FOR VISIT:     Chief Complaint   Patient presents with   • Anemia     Possible Procrit. Not having any new isseus        HEMATOLOGY / ONCOLOGY HISTORY:    No history exists.           INTERVAL HISTORY  Patient ID: Maria C Shrestha is a 59 y.o. year old female Cancer Staging    10/9/19: no issues today. Denies sob,cp,n/v/dizziness.occassional blood streaked stool from hemorrhoids.  --rest of ros unremarkable. Physical exam unremarkable.    Past Medical History:   Past Medical History:   Diagnosis Date   • Acquired hypothyroidism 2/6/2017   • Allergic    • Anemia    • Anxiety    • Arthritis    • Cellulitis    • Chronic kidney disease     3rd stage   • Depression    • Disease of thyroid gland    • Dyslipidemia    • Elevated cholesterol    • Essential hypertension    • Fatigue    • Gallbladder abscess    • Hearing loss    • Light headed    • Limited range of motion (ROM) of shoulder     right   • Obesity    • Palpitation    • Short of breath on exertion    • Sleep apnea    • Type 2 diabetes mellitus (CMS/HCC)    • Type II diabetes mellitus, uncontrolled (CMS/HCC)    • Wears glasses      Past Surgical History:   Past Surgical History:   Procedure Laterality Date   • ADENOIDECTOMY     • ANAL FISTULA REPAIR      x 2    • CHOLECYSTECTOMY     • COLONOSCOPY  01/02/2014   • COLONOSCOPY N/A 3/22/2017    Procedure: COLONOSCOPY WITH ANESTHESIA;  Surgeon: Mono Linder MD;  Location: [facility] ENDOSCOPY;  Service:    • D&C HYSTEROSCOPY N/A 7/25/2018    Procedure: DILATATION AND CURETTAGE HYSTEROSCOPY;  Surgeon: Michael Castaneda MD;  Location: [facility] OR;  Service: Obstetrics/Gynecology   • DILATATION AND CURETTAGE      X 2   •  ENDOSCOPY     • TONSILLECTOMY       Social History:   Social History     Socioeconomic History   • Marital status:      Spouse name: Not on file   • Number of children: Not on file   • Years of education: Not on file   • Highest education level: Not on file   Tobacco Use   • Smoking status: Never Smoker   • Smokeless tobacco: Never Used   Substance and Sexual Activity   • Alcohol use: No   • Drug use: No   • Sexual activity: No     Birth control/protection: Post-menopausal     Family History:   Family History   Problem Relation Age of Onset   • Diabetes Other    • Cancer Mother    • Cancer Father    • Heart disease Father    • Diabetes Father    • Obesity Father    • Stroke Father    • Cancer Maternal Grandmother    • Uterine cancer Maternal Grandmother    • Diabetes Maternal Grandfather    • Cancer Paternal Grandmother    • Colon cancer Paternal Grandmother    • Diabetes Paternal Grandfather    • Heart disease Paternal Grandfather    • No Known Problems Sister    • No Known Problems Brother    • No Known Problems Daughter    • No Known Problems Son    • No Known Problems Maternal Aunt    • No Known Problems Paternal Aunt    • BRCA 1/2 Neg Hx    • Breast cancer Neg Hx    • Endometrial cancer Neg Hx    • Ovarian cancer Neg Hx        Review of Systems   See HPI otherwise unremarkable.  Performance Status:  Asymptomatic    Medications:    Current Outpatient Medications   Medication Sig Dispense Refill   • allopurinol (ZYLOPRIM) 100 MG tablet Take 100 mg by mouth 2 (Two) Times a Day.     • amLODIPine (NORVASC) 5 MG tablet      • aspirin 81 MG tablet Take 81 mg by mouth Daily. Stop 7/22/2018     • busPIRone (BUSPAR) 10 MG tablet buspirone 10 mg tablet   Take 2 tablets twice a day by oral route as needed for 90 days.     • carvedilol (COREG) 3.125 MG tablet Take 3.125 mg by mouth 2 (Two) Times a Day.     • cetirizine (zyrTEC) 10 MG tablet Take 10 mg by mouth As Needed.     • Cholecalciferol (VITAMIN D3) 72932 units  "tablet Vitamin D2 50,000 unit capsule   Take 1 capsule every week by oral route. Sunday     • cinacalcet (SENSIPAR) 30 MG tablet Take 1 tablet by mouth Daily. 30 tablet 3   • citalopram (CeleXA) 20 MG tablet Take 20 mg by mouth Daily.     • diazePAM (VALIUM) 2 MG tablet Take 2 mg by mouth As Needed for Anxiety.     • Dulaglutide (TRULICITY) 1.5 MG/0.5ML solution pen-injector Inject 1.5 mg under the skin into the appropriate area as directed Every 7 (Seven) Days. 6 mL 3   • fluticasone (VERAMYST) 27.5 MCG/SPRAY nasal spray 2 sprays into each nostril Daily.     • Glucose Blood (BLOOD GLUCOSE TEST) strip Use 4 x daily, use any brand covered by insurance or same brand as before 360 each 3   • ibuprofen (ADVIL,MOTRIN) 800 MG tablet Take 800 mg by mouth Every 8 (Eight) Hours As Needed for Mild Pain .     • insulin glargine (LANTUS) 100 UNIT/ML injection INJECT 200 UNITS DAILY 180 mL 3   • Insulin Lispro (HUMALOG KWIKPEN) 200 UNIT/ML solution pen-injector Inject 30 Units under the skin into the appropriate area as directed 3 (Three) Times a Day With Meals. 5 pen 11   • Insulin Pen Needle (PEN NEEDLES) 31G X 6 MM misc Use to inject insulin 4 times daily. 200 each 11   • Insulin Syringe 31G X 5/16\" 1 ML misc 4 x daily 120 each 11   • lamoTRIgine (LaMICtal) 50 MG tablet dispersible disintegrating tablet Take 50 mg by mouth Every Night.     • Lancets (FREESTYLE) lancets FOUR TIMES A  each 11   • levothyroxine (SYNTHROID) 75 MCG tablet Take 1 tablet by mouth Daily. 30 tablet 11   • lisinopril (PRINIVIL,ZESTRIL) 20 MG tablet Take 20 mg by mouth 2 (Two) Times a Day.     • Melatonin 10 MG tablet Take  by mouth.     • Multiple Vitamins-Minerals (MULTIVITAMIN ADULT PO) Take 1 tablet by mouth Daily.     • potassium gluconate 595 (99 K) MG tablet tablet potassium gluconate 595 mg (99 mg) tablet   Take 2 tablets every day by oral route.     • rosuvastatin (CRESTOR) 10 MG tablet Take 10 mg by mouth Daily.     • sodium bicarbonate " "650 MG tablet Take 650 mg by mouth 3 (Three) Times a Day.     • traMADol (ULTRAM) 50 MG tablet Take 1 tablet by mouth Every 6 (Six) Hours As Needed for Moderate Pain . 8 tablet 0     No current facility-administered medications for this visit.        ALLERGIES:    Allergies   Allergen Reactions   • Codeine Nausea Only       Objective      Vitals:    10/09/19 1305   BP: 128/62   Pulse: 77   Resp: 16   Temp: 98.2 °F (36.8 °C)   TempSrc: Temporal   SpO2: 98%   Weight: (!) 178 kg (392 lb 9.6 oz)   Height: 170.2 cm (67\")   PainSc: 0-No pain         Current Status 10/9/2019   ECOG score 1         Physical Examremains the same  General Appearance:obese female.  Patient is awake, alert, oriented and in no acute distress. Patient is welldeveloped, wellnourished, and appears stated age.  HEENT: Normocephalic. Sclerae clear, conjunctiva pink, extraocular movements intact, pupils, round, reactive to light and  accommodation. Mouth and throat are clear with moist oral mucosa.  NECK: Supple, no jugular venous distention, thyroid not enlarged.  LYMPH: No cervical, supraclavicular, axillary, or inguinal lymphadenopathy.  CHEST: Equal bilateral expansion, AP  diameter normal, resonant percussion note  LUNGS: Good air movement, no rales, rhonchi, rubs or wheezes with auscultation  CARDIO: Regular sinus rhythm, no murmurs, gallops or rubs.  ABDOMEN: obese abdomen. Nondistended, soft, No tenderness, no guarding, no rebound, No hepatosplenomegaly. No abdominal masses. Bowel sounds positive. No hernia  GENITALIA: Not examined.  BREASTS: Not examined.  MUSKEL: No joint swelling, decreased motion, or inflammation  EXTREMS: kristie YAZAN. No clubbing, cyanosis, No varicose veins.  NEURO: Grossly nonfocal, Gait is coordinated and smooth, Cognition is preserved.  SKIN: No rashes, no ecchymoses, no petechia.  PSYCH: Oriented to time, place and person. Memory is preserved. Mood and affect appear normal  RECENT LABS:  Lab on 10/09/2019   Component " Date Value Ref Range Status   • WBC 10/09/2019 10.03  3.40 - 10.80 10*3/mm3 Final   • RBC 10/09/2019 3.57* 3.77 - 5.28 10*6/mm3 Final   • Hemoglobin 10/09/2019 10.6* 12.0 - 15.9 g/dL Final   • Hematocrit 10/09/2019 32.5* 34.0 - 46.6 % Final   • MCV 10/09/2019 91.0  79.0 - 97.0 fL Final   • MCH 10/09/2019 29.7  26.6 - 33.0 pg Final   • MCHC 10/09/2019 32.6  31.5 - 35.7 g/dL Final   • RDW 10/09/2019 16.1* 12.3 - 15.4 % Final   • RDW-SD 10/09/2019 53.2  37.0 - 54.0 fl Final   • MPV 10/09/2019 10.9  6.0 - 12.0 fL Final   • Platelets 10/09/2019 237  140 - 450 10*3/mm3 Final   • Neutrophil % 10/09/2019 70.3  42.7 - 76.0 % Final   • Lymphocyte % 10/09/2019 20.5  19.6 - 45.3 % Final   • Monocyte % 10/09/2019 6.0  5.0 - 12.0 % Final   • Eosinophil % 10/09/2019 2.2  0.3 - 6.2 % Final   • Basophil % 10/09/2019 0.4  0.0 - 1.5 % Final   • Immature Grans % 10/09/2019 0.6* 0.0 - 0.5 % Final   • Neutrophils, Absolute 10/09/2019 7.05* 1.70 - 7.00 10*3/mm3 Final   • Lymphocytes, Absolute 10/09/2019 2.06  0.70 - 3.10 10*3/mm3 Final   • Monocytes, Absolute 10/09/2019 0.60  0.10 - 0.90 10*3/mm3 Final   • Eosinophils, Absolute 10/09/2019 0.22  0.00 - 0.40 10*3/mm3 Final   • Basophils, Absolute 10/09/2019 0.04  0.00 - 0.20 10*3/mm3 Final   • Immature Grans, Absolute 10/09/2019 0.06* 0.00 - 0.05 10*3/mm3 Final   • nRBC 10/09/2019 0.0  0.0 - 0.2 /100 WBC Final       RADIOLOGY:  No results found.         Assessment/Plan  Maria C Shrestha is a 59 y.o. year old female  Whom we are seeing for AoCKD on procrit that is stable.    Patient Active Problem List   Diagnosis   • Type 2 diabetes mellitus with stage 3 chronic kidney disease, with long-term current use of insulin (CMS/HCC)   • Mixed diabetic hyperlipidemia associated with type 2 diabetes mellitus (CMS/HCC)   • Hypertension associated with diabetes (CMS/HCC)   • Vitamin D deficiency   • B12 deficiency   • Anemia in CKD (chronic kidney disease)   • Acquired hypothyroidism   • Chronic kidney  disease, stage 4 (severe) (CMS/HCC)   • Anemia   • Anxiety   • Deep venous thrombosis of lower extremity (CMS/HCC)   • Disease of liver   • Embolism (CMS/HCC)   • Hypertriglyceridemia   • Hypocalcemia   • Mood disorder (CMS/HCC)   • Morbid obesity (CMS/HCC)   • Sleep apnea   • Acute renal failure superimposed on stage 3 chronic kidney disease (CMS/HCC)   • Anemia of chronic renal failure   • Hypertension   • Diabetes mellitus (CMS/HCC)   • Thyroid disease   • Hypothyroidism   • Type 2 DM with CKD stage 3 and hypertension (CMS/HCC)   • Vitamin D deficiency          1. AoCKD: s/p iron infusions. Hg of 10.3. Cr 1.89, eGFR 27.  - Ok for procrit shot today  -iron 77, %sat 28. Iron not indicated      2. HTN: on amlodipine, carvedilol, lisinopril    3.DM: on insulin    4.Hypothyroidism: on synthroid  5. Anxiety/depression: on diazepam, celexa  6. Hyperlipidemia: on crestor.  7. CKD stage 4: sees renal. grf 26        Rodrigo Huitron MD    10/9/2019    1:26 PM

## 2019-11-06 ENCOUNTER — INFUSION (OUTPATIENT)
Dept: ONCOLOGY | Facility: HOSPITAL | Age: 59
End: 2019-11-06

## 2019-11-06 ENCOUNTER — OFFICE VISIT (OUTPATIENT)
Dept: ONCOLOGY | Facility: CLINIC | Age: 59
End: 2019-11-06

## 2019-11-06 ENCOUNTER — LAB (OUTPATIENT)
Dept: LAB | Facility: HOSPITAL | Age: 59
End: 2019-11-06

## 2019-11-06 VITALS
RESPIRATION RATE: 16 BRPM | OXYGEN SATURATION: 98 % | DIASTOLIC BLOOD PRESSURE: 75 MMHG | HEIGHT: 67 IN | SYSTOLIC BLOOD PRESSURE: 162 MMHG | HEART RATE: 86 BPM | BODY MASS INDEX: 45.99 KG/M2 | TEMPERATURE: 98 F | WEIGHT: 293 LBS

## 2019-11-06 VITALS
SYSTOLIC BLOOD PRESSURE: 178 MMHG | BODY MASS INDEX: 45.99 KG/M2 | DIASTOLIC BLOOD PRESSURE: 73 MMHG | HEART RATE: 82 BPM | OXYGEN SATURATION: 99 % | RESPIRATION RATE: 20 BRPM | TEMPERATURE: 99 F | WEIGHT: 293 LBS | HEIGHT: 67 IN

## 2019-11-06 DIAGNOSIS — D63.1 ANEMIA OF CHRONIC RENAL FAILURE, STAGE 4 (SEVERE) (HCC): ICD-10-CM

## 2019-11-06 DIAGNOSIS — D63.1 ANEMIA IN STAGE 3 CHRONIC KIDNEY DISEASE (HCC): Primary | ICD-10-CM

## 2019-11-06 DIAGNOSIS — N18.30 ANEMIA IN STAGE 3 CHRONIC KIDNEY DISEASE (HCC): ICD-10-CM

## 2019-11-06 DIAGNOSIS — D63.1 ANEMIA IN STAGE 3 CHRONIC KIDNEY DISEASE (HCC): ICD-10-CM

## 2019-11-06 DIAGNOSIS — N18.4 CHRONIC KIDNEY DISEASE, STAGE 4 (SEVERE) (HCC): ICD-10-CM

## 2019-11-06 DIAGNOSIS — N18.4 ANEMIA OF CHRONIC RENAL FAILURE, STAGE 4 (SEVERE) (HCC): ICD-10-CM

## 2019-11-06 DIAGNOSIS — N18.30 ANEMIA IN STAGE 3 CHRONIC KIDNEY DISEASE (HCC): Primary | ICD-10-CM

## 2019-11-06 LAB
ALBUMIN SERPL-MCNC: 3.7 G/DL (ref 3.5–5.2)
ALBUMIN/GLOB SERPL: 1.2 G/DL
ALP SERPL-CCNC: 72 U/L (ref 39–117)
ALT SERPL W P-5'-P-CCNC: 10 U/L (ref 1–33)
ANION GAP SERPL CALCULATED.3IONS-SCNC: 13 MMOL/L (ref 5–15)
AST SERPL-CCNC: 16 U/L (ref 1–32)
BASOPHILS # BLD AUTO: 0.05 10*3/MM3 (ref 0–0.2)
BASOPHILS NFR BLD AUTO: 0.5 % (ref 0–1.5)
BILIRUB SERPL-MCNC: <0.2 MG/DL (ref 0.2–1.2)
BUN BLD-MCNC: 32 MG/DL (ref 6–20)
BUN/CREAT SERPL: 15.2 (ref 7–25)
CALCIUM SPEC-SCNC: 8.7 MG/DL (ref 8.6–10.5)
CHLORIDE SERPL-SCNC: 102 MMOL/L (ref 98–107)
CO2 SERPL-SCNC: 27 MMOL/L (ref 22–29)
CREAT BLD-MCNC: 2.1 MG/DL (ref 0.57–1)
DEPRECATED RDW RBC AUTO: 54.6 FL (ref 37–54)
EOSINOPHIL # BLD AUTO: 0.17 10*3/MM3 (ref 0–0.4)
EOSINOPHIL NFR BLD AUTO: 1.7 % (ref 0.3–6.2)
ERYTHROCYTE [DISTWIDTH] IN BLOOD BY AUTOMATED COUNT: 16.4 % (ref 12.3–15.4)
GFR SERPL CREATININE-BSD FRML MDRD: 24 ML/MIN/1.73
GLOBULIN UR ELPH-MCNC: 3.2 GM/DL
GLUCOSE BLD-MCNC: 189 MG/DL (ref 65–99)
HCT VFR BLD AUTO: 34.7 % (ref 34–46.6)
HGB BLD-MCNC: 11.3 G/DL (ref 12–15.9)
HOLD SPECIMEN: NORMAL
IMM GRANULOCYTES # BLD AUTO: 0.06 10*3/MM3 (ref 0–0.05)
IMM GRANULOCYTES NFR BLD AUTO: 0.6 % (ref 0–0.5)
LYMPHOCYTES # BLD AUTO: 1.95 10*3/MM3 (ref 0.7–3.1)
LYMPHOCYTES NFR BLD AUTO: 19.4 % (ref 19.6–45.3)
MCH RBC QN AUTO: 29.6 PG (ref 26.6–33)
MCHC RBC AUTO-ENTMCNC: 32.6 G/DL (ref 31.5–35.7)
MCV RBC AUTO: 90.8 FL (ref 79–97)
MONOCYTES # BLD AUTO: 0.47 10*3/MM3 (ref 0.1–0.9)
MONOCYTES NFR BLD AUTO: 4.7 % (ref 5–12)
NEUTROPHILS # BLD AUTO: 7.37 10*3/MM3 (ref 1.7–7)
NEUTROPHILS NFR BLD AUTO: 73.1 % (ref 42.7–76)
NRBC BLD AUTO-RTO: 0 /100 WBC (ref 0–0.2)
PLATELET # BLD AUTO: 250 10*3/MM3 (ref 140–450)
PMV BLD AUTO: 10.1 FL (ref 6–12)
POTASSIUM BLD-SCNC: 4.1 MMOL/L (ref 3.5–5.2)
PROT SERPL-MCNC: 6.9 G/DL (ref 6–8.5)
RBC # BLD AUTO: 3.82 10*6/MM3 (ref 3.77–5.28)
SODIUM BLD-SCNC: 142 MMOL/L (ref 136–145)
WBC NRBC COR # BLD: 10.07 10*3/MM3 (ref 3.4–10.8)

## 2019-11-06 PROCEDURE — 85025 COMPLETE CBC W/AUTO DIFF WBC: CPT

## 2019-11-06 PROCEDURE — 96372 THER/PROPH/DIAG INJ SC/IM: CPT

## 2019-11-06 PROCEDURE — 36415 COLL VENOUS BLD VENIPUNCTURE: CPT

## 2019-11-06 PROCEDURE — 99214 OFFICE O/P EST MOD 30 MIN: CPT | Performed by: INTERNAL MEDICINE

## 2019-11-06 PROCEDURE — 80053 COMPREHEN METABOLIC PANEL: CPT

## 2019-11-06 PROCEDURE — 25010000002 EPOETIN ALFA-EPBX 40000 UNIT/ML SOLUTION: Performed by: INTERNAL MEDICINE

## 2019-11-06 RX ADMIN — EPOETIN ALFA-EPBX 40000 UNITS: 40000 INJECTION, SOLUTION INTRAVENOUS; SUBCUTANEOUS at 15:35

## 2019-11-06 NOTE — PROGRESS NOTES
Called and spoke with RUDY Bacon regarding pt's labs today. She spoke with the md and she states to give if hgb less 12.

## 2019-11-06 NOTE — PROGRESS NOTES
[facility]  HEMATOLOGY & ONCOLOGY    Cancer Staging Information:  Cancer Staging  No matching staging information was found for the patient.      Subjective     VISIT DIAGNOSIS:   Encounter Diagnosis   Name Primary?   • Anemia in stage 3 chronic kidney disease (CMS/HCC)        REASON FOR VISIT:     Chief Complaint   Patient presents with   • Anemia     here for follow up    • medication change by other doctor     blood work came back decreasing potassium pills, sodium bicarb 4 times a day, increasing lisinopril        HEMATOLOGY / ONCOLOGY HISTORY:    No history exists.           INTERVAL HISTORY  Patient ID: Maria C Shrestha is a 59 y.o. year old female Cancer Staging    10/9/19: no issues today. Denies sob,cp,n/v/dizziness.occassional blood streaked stool from hemorrhoids.  --rest of ros unremarkable. Physical exam unremarkable.    Past Medical History:   Past Medical History:   Diagnosis Date   • Acquired hypothyroidism 2/6/2017   • Allergic    • Anemia    • Anxiety    • Arthritis    • Cellulitis    • Chronic kidney disease     3rd stage   • Depression    • Disease of thyroid gland    • Dyslipidemia    • Elevated cholesterol    • Essential hypertension    • Fatigue    • Gallbladder abscess    • Hearing loss    • Light headed    • Limited range of motion (ROM) of shoulder     right   • Obesity    • Palpitation    • Short of breath on exertion    • Sleep apnea    • Type 2 diabetes mellitus (CMS/HCC)    • Type II diabetes mellitus, uncontrolled (CMS/HCC)    • Wears glasses      Past Surgical History:   Past Surgical History:   Procedure Laterality Date   • ADENOIDECTOMY     • ANAL FISTULA REPAIR      x 2    • CHOLECYSTECTOMY     • COLONOSCOPY  01/02/2014   • COLONOSCOPY N/A 3/22/2017    Procedure: COLONOSCOPY WITH ANESTHESIA;  Surgeon: Mono Linder MD;  Location: [facility] ENDOSCOPY;  Service:    • D&C HYSTEROSCOPY N/A 7/25/2018    Procedure: DILATATION AND CURETTAGE HYSTEROSCOPY;  Surgeon: Michael  TRACIE Castaneda MD;  Location: [facility];  Service: Obstetrics/Gynecology   • DILATATION AND CURETTAGE      X 2   • ENDOSCOPY     • TONSILLECTOMY       Social History:   Social History     Socioeconomic History   • Marital status:      Spouse name: Not on file   • Number of children: Not on file   • Years of education: Not on file   • Highest education level: Not on file   Tobacco Use   • Smoking status: Never Smoker   • Smokeless tobacco: Never Used   Substance and Sexual Activity   • Alcohol use: No   • Drug use: No   • Sexual activity: No     Birth control/protection: Post-menopausal     Family History:   Family History   Problem Relation Age of Onset   • Diabetes Other    • Cancer Mother    • Cancer Father    • Heart disease Father    • Diabetes Father    • Obesity Father    • Stroke Father    • Cancer Maternal Grandmother    • Uterine cancer Maternal Grandmother    • Diabetes Maternal Grandfather    • Cancer Paternal Grandmother    • Colon cancer Paternal Grandmother    • Diabetes Paternal Grandfather    • Heart disease Paternal Grandfather    • No Known Problems Sister    • No Known Problems Brother    • No Known Problems Daughter    • No Known Problems Son    • No Known Problems Maternal Aunt    • No Known Problems Paternal Aunt    • BRCA 1/2 Neg Hx    • Breast cancer Neg Hx    • Endometrial cancer Neg Hx    • Ovarian cancer Neg Hx        Review of Systems   See HPI otherwise unremarkable.  Performance Status:  Asymptomatic    Medications:    Current Outpatient Medications   Medication Sig Dispense Refill   • allopurinol (ZYLOPRIM) 100 MG tablet Take 100 mg by mouth 2 (Two) Times a Day.     • amLODIPine (NORVASC) 5 MG tablet      • aspirin 81 MG tablet Take 81 mg by mouth Daily. Stop 7/22/2018     • busPIRone (BUSPAR) 10 MG tablet buspirone 10 mg tablet   Take 2 tablets twice a day by oral route as needed for 90 days.     • carvedilol (COREG) 3.125 MG tablet Take 3.125 mg by mouth 2 (Two) Times a Day.    "  • cetirizine (zyrTEC) 10 MG tablet Take 10 mg by mouth As Needed.     • Cholecalciferol (VITAMIN D3) 45985 units tablet Vitamin D2 50,000 unit capsule   Take 1 capsule every week by oral route. Sunday     • cinacalcet (SENSIPAR) 30 MG tablet Take 1 tablet by mouth Daily. 30 tablet 3   • citalopram (CeleXA) 20 MG tablet Take 20 mg by mouth Daily.     • diazePAM (VALIUM) 2 MG tablet Take 2 mg by mouth As Needed for Anxiety.     • Dulaglutide (TRULICITY) 1.5 MG/0.5ML solution pen-injector Inject 1.5 mg under the skin into the appropriate area as directed Every 7 (Seven) Days. 6 mL 3   • fluticasone (VERAMYST) 27.5 MCG/SPRAY nasal spray 2 sprays into each nostril Daily.     • Glucose Blood (BLOOD GLUCOSE TEST) strip Use 4 x daily, use any brand covered by insurance or same brand as before 360 each 3   • ibuprofen (ADVIL,MOTRIN) 800 MG tablet Take 800 mg by mouth Every 8 (Eight) Hours As Needed for Mild Pain .     • insulin glargine (LANTUS) 100 UNIT/ML injection INJECT 200 UNITS DAILY 180 mL 3   • Insulin Lispro (HUMALOG KWIKPEN) 200 UNIT/ML solution pen-injector Inject 30 Units under the skin into the appropriate area as directed 3 (Three) Times a Day With Meals. 5 pen 11   • Insulin Pen Needle (PEN NEEDLES) 31G X 6 MM misc Use to inject insulin 4 times daily. 200 each 11   • Insulin Syringe 31G X 5/16\" 1 ML misc 4 x daily 120 each 11   • lamoTRIgine (LaMICtal) 50 MG tablet dispersible disintegrating tablet Take 50 mg by mouth Every Night.     • Lancets (FREESTYLE) lancets FOUR TIMES A  each 11   • levothyroxine (SYNTHROID) 75 MCG tablet Take 1 tablet by mouth Daily. 30 tablet 11   • lisinopril (PRINIVIL,ZESTRIL) 20 MG tablet Take 40 mg by mouth 2 (Two) Times a Day.     • Melatonin 10 MG tablet Take  by mouth.     • Multiple Vitamins-Minerals (MULTIVITAMIN ADULT PO) Take 1 tablet by mouth Daily.     • potassium gluconate 595 (99 K) MG tablet tablet potassium gluconate 595 mg (99 mg) tablet   Take 1 tablets " "every day by oral route.     • rosuvastatin (CRESTOR) 10 MG tablet Take 10 mg by mouth Daily.     • sodium bicarbonate 650 MG tablet Take 650 mg by mouth 4 (Four) Times a Day.     • traMADol (ULTRAM) 50 MG tablet Take 1 tablet by mouth Every 6 (Six) Hours As Needed for Moderate Pain . 8 tablet 0     No current facility-administered medications for this visit.        ALLERGIES:    Allergies   Allergen Reactions   • Codeine Nausea Only       Objective      Vitals:    11/06/19 1419   BP: 162/75   Pulse: 86   Resp: 16   Temp: 98 °F (36.7 °C)   SpO2: 98%   Weight: (!) 174 kg (382 lb 14.4 oz)   Height: 170.2 cm (67\")   PainSc: 0-No pain         Current Status 11/6/2019   ECOG score 0         Physical Examremains the same  General Appearance:obese female.  Patient is awake, alert, oriented and in no acute distress. Patient is welldeveloped, wellnourished, and appears stated age.  HEENT: Normocephalic. Sclerae clear, conjunctiva pink, extraocular movements intact, pupils, round, reactive to light and  accommodation. Mouth and throat are clear with moist oral mucosa.  NECK: Supple, no jugular venous distention, thyroid not enlarged.  LYMPH: No cervical, supraclavicular, axillary, or inguinal lymphadenopathy.  CHEST: Equal bilateral expansion, AP  diameter normal, resonant percussion note  LUNGS: Good air movement, no rales, rhonchi, rubs or wheezes with auscultation  CARDIO: Regular sinus rhythm, no murmurs, gallops or rubs.  ABDOMEN: obese abdomen. Nondistended, soft, No tenderness, no guarding, no rebound, No hepatosplenomegaly. No abdominal masses. Bowel sounds positive. No hernia  GENITALIA: Not examined.  BREASTS: Not examined.  MUSKEL: No joint swelling, decreased motion, or inflammation  EXTREMS: kristie YAZAN. No clubbing, cyanosis, No varicose veins.  NEURO: Grossly nonfocal, Gait is coordinated and smooth, Cognition is preserved.  SKIN: No rashes, no ecchymoses, no petechia.  PSYCH: Oriented to time, place and person. " Memory is preserved. Mood and affect appear normal  RECENT LABS:  Lab on 11/06/2019   Component Date Value Ref Range Status   • Glucose 11/06/2019 189* 65 - 99 mg/dL Final   • BUN 11/06/2019 32* 6 - 20 mg/dL Final   • Creatinine 11/06/2019 2.10* 0.57 - 1.00 mg/dL Final   • Sodium 11/06/2019 142  136 - 145 mmol/L Final   • Potassium 11/06/2019 4.1  3.5 - 5.2 mmol/L Final   • Chloride 11/06/2019 102  98 - 107 mmol/L Final   • CO2 11/06/2019 27.0  22.0 - 29.0 mmol/L Final   • Calcium 11/06/2019 8.7  8.6 - 10.5 mg/dL Final   • Total Protein 11/06/2019 6.9  6.0 - 8.5 g/dL Final   • Albumin 11/06/2019 3.70  3.50 - 5.20 g/dL Final   • ALT (SGPT) 11/06/2019 10  1 - 33 U/L Final   • AST (SGOT) 11/06/2019 16  1 - 32 U/L Final   • Alkaline Phosphatase 11/06/2019 72  39 - 117 U/L Final   • Total Bilirubin 11/06/2019 <0.2* 0.2 - 1.2 mg/dL Final   • eGFR Non African Amer 11/06/2019 24* >60 mL/min/1.73 Final   • Globulin 11/06/2019 3.2  gm/dL Final   • A/G Ratio 11/06/2019 1.2  g/dL Final   • BUN/Creatinine Ratio 11/06/2019 15.2  7.0 - 25.0 Final   • Anion Gap 11/06/2019 13.0  5.0 - 15.0 mmol/L Final   • WBC 11/06/2019 10.07  3.40 - 10.80 10*3/mm3 Final   • RBC 11/06/2019 3.82  3.77 - 5.28 10*6/mm3 Final   • Hemoglobin 11/06/2019 11.3* 12.0 - 15.9 g/dL Final   • Hematocrit 11/06/2019 34.7  34.0 - 46.6 % Final   • MCV 11/06/2019 90.8  79.0 - 97.0 fL Final   • MCH 11/06/2019 29.6  26.6 - 33.0 pg Final   • MCHC 11/06/2019 32.6  31.5 - 35.7 g/dL Final   • RDW 11/06/2019 16.4* 12.3 - 15.4 % Final   • RDW-SD 11/06/2019 54.6* 37.0 - 54.0 fl Final   • MPV 11/06/2019 10.1  6.0 - 12.0 fL Final   • Platelets 11/06/2019 250  140 - 450 10*3/mm3 Final   • Neutrophil % 11/06/2019 73.1  42.7 - 76.0 % Final   • Lymphocyte % 11/06/2019 19.4* 19.6 - 45.3 % Final   • Monocyte % 11/06/2019 4.7* 5.0 - 12.0 % Final   • Eosinophil % 11/06/2019 1.7  0.3 - 6.2 % Final   • Basophil % 11/06/2019 0.5  0.0 - 1.5 % Final   • Immature Grans % 11/06/2019 0.6* 0.0  - 0.5 % Final   • Neutrophils, Absolute 11/06/2019 7.37* 1.70 - 7.00 10*3/mm3 Final   • Lymphocytes, Absolute 11/06/2019 1.95  0.70 - 3.10 10*3/mm3 Final   • Monocytes, Absolute 11/06/2019 0.47  0.10 - 0.90 10*3/mm3 Final   • Eosinophils, Absolute 11/06/2019 0.17  0.00 - 0.40 10*3/mm3 Final   • Basophils, Absolute 11/06/2019 0.05  0.00 - 0.20 10*3/mm3 Final   • Immature Grans, Absolute 11/06/2019 0.06* 0.00 - 0.05 10*3/mm3 Final   • nRBC 11/06/2019 0.0  0.0 - 0.2 /100 WBC Final       RADIOLOGY:  No results found.         Assessment/Plan  Maria C Shrestha is a 59 y.o. year old female  Whom we are seeing for AoCKD on procrit that is stable.    Patient Active Problem List   Diagnosis   • Type 2 diabetes mellitus with stage 3 chronic kidney disease, with long-term current use of insulin (CMS/HCC)   • Mixed diabetic hyperlipidemia associated with type 2 diabetes mellitus (CMS/HCC)   • Hypertension associated with diabetes (CMS/HCC)   • Vitamin D deficiency   • B12 deficiency   • Anemia in CKD (chronic kidney disease)   • Acquired hypothyroidism   • Chronic kidney disease, stage 4 (severe) (CMS/HCC)   • Anemia   • Anxiety   • Deep venous thrombosis of lower extremity (CMS/HCC)   • Disease of liver   • Embolism (CMS/HCC)   • Hypertriglyceridemia   • Hypocalcemia   • Mood disorder (CMS/HCC)   • Morbid obesity (CMS/HCC)   • Sleep apnea   • Acute renal failure superimposed on stage 3 chronic kidney disease (CMS/HCC)   • Anemia of chronic renal failure   • Hypertension   • Diabetes mellitus (CMS/HCC)   • Thyroid disease   • Hypothyroidism   • Type 2 DM with CKD stage 3 and hypertension (CMS/HCC)   • Vitamin D deficiency          1. AoCKD: s/p iron infusions. Labs reviewed with the pt cr 2.10, wbc 10.07, Hg 11.3, plt 250.  - Ok for procrit shot today  -iron 77, %sat 28. Iron not indicated      2. HTN: on amlodipine, carvedilol, lisinopril    3.DM: on trulicity    4.Hypothyroidism: on synthroid  5. Anxiety/depression: on diazepam,  celexa  6. Hyperlipidemia: on crestor.  7. CKD stage 4: sees renal. grf 24          Rodrigo Huitron MD    11/6/2019    2:45 PM

## 2019-12-04 ENCOUNTER — OFFICE VISIT (OUTPATIENT)
Dept: ONCOLOGY | Facility: CLINIC | Age: 59
End: 2019-12-04

## 2019-12-04 ENCOUNTER — LAB (OUTPATIENT)
Dept: LAB | Facility: HOSPITAL | Age: 59
End: 2019-12-04

## 2019-12-04 ENCOUNTER — INFUSION (OUTPATIENT)
Dept: ONCOLOGY | Facility: HOSPITAL | Age: 59
End: 2019-12-04

## 2019-12-04 VITALS
TEMPERATURE: 97.6 F | OXYGEN SATURATION: 99 % | RESPIRATION RATE: 16 BRPM | BODY MASS INDEX: 45.99 KG/M2 | DIASTOLIC BLOOD PRESSURE: 90 MMHG | HEIGHT: 67 IN | WEIGHT: 293 LBS | HEART RATE: 71 BPM | SYSTOLIC BLOOD PRESSURE: 150 MMHG

## 2019-12-04 VITALS
TEMPERATURE: 98.3 F | WEIGHT: 293 LBS | BODY MASS INDEX: 48.82 KG/M2 | DIASTOLIC BLOOD PRESSURE: 73 MMHG | SYSTOLIC BLOOD PRESSURE: 176 MMHG | HEIGHT: 65 IN | HEART RATE: 73 BPM

## 2019-12-04 DIAGNOSIS — D63.1 ANEMIA IN STAGE 3 CHRONIC KIDNEY DISEASE (HCC): ICD-10-CM

## 2019-12-04 DIAGNOSIS — D63.1 ANEMIA IN STAGE 3 CHRONIC KIDNEY DISEASE (HCC): Primary | ICD-10-CM

## 2019-12-04 DIAGNOSIS — N18.30 ANEMIA IN STAGE 3 CHRONIC KIDNEY DISEASE (HCC): ICD-10-CM

## 2019-12-04 DIAGNOSIS — N18.30 ANEMIA IN STAGE 3 CHRONIC KIDNEY DISEASE (HCC): Primary | ICD-10-CM

## 2019-12-04 LAB
ALBUMIN SERPL-MCNC: 3.6 G/DL (ref 3.5–5.2)
ALBUMIN/GLOB SERPL: 1 G/DL
ALP SERPL-CCNC: 84 U/L (ref 39–117)
ALT SERPL W P-5'-P-CCNC: 11 U/L (ref 1–33)
ANION GAP SERPL CALCULATED.3IONS-SCNC: 13 MMOL/L (ref 5–15)
AST SERPL-CCNC: 12 U/L (ref 1–32)
BASOPHILS # BLD AUTO: 0.05 10*3/MM3 (ref 0–0.2)
BASOPHILS NFR BLD AUTO: 0.5 % (ref 0–1.5)
BILIRUB SERPL-MCNC: 0.2 MG/DL (ref 0.2–1.2)
BUN BLD-MCNC: 38 MG/DL (ref 6–20)
BUN/CREAT SERPL: 17.6 (ref 7–25)
CALCIUM SPEC-SCNC: 8.9 MG/DL (ref 8.6–10.5)
CHLORIDE SERPL-SCNC: 106 MMOL/L (ref 98–107)
CO2 SERPL-SCNC: 24 MMOL/L (ref 22–29)
CREAT BLD-MCNC: 2.16 MG/DL (ref 0.57–1)
DEPRECATED RDW RBC AUTO: 54.5 FL (ref 37–54)
EOSINOPHIL # BLD AUTO: 0.22 10*3/MM3 (ref 0–0.4)
EOSINOPHIL NFR BLD AUTO: 2.3 % (ref 0.3–6.2)
ERYTHROCYTE [DISTWIDTH] IN BLOOD BY AUTOMATED COUNT: 16.3 % (ref 12.3–15.4)
GFR SERPL CREATININE-BSD FRML MDRD: 23 ML/MIN/1.73
GLOBULIN UR ELPH-MCNC: 3.6 GM/DL
GLUCOSE BLD-MCNC: 250 MG/DL (ref 65–99)
HCT VFR BLD AUTO: 34.1 % (ref 34–46.6)
HGB BLD-MCNC: 11.2 G/DL (ref 12–15.9)
HOLD SPECIMEN: NORMAL
HOLD SPECIMEN: NORMAL
IMM GRANULOCYTES # BLD AUTO: 0.07 10*3/MM3 (ref 0–0.05)
IMM GRANULOCYTES NFR BLD AUTO: 0.7 % (ref 0–0.5)
LYMPHOCYTES # BLD AUTO: 1.82 10*3/MM3 (ref 0.7–3.1)
LYMPHOCYTES NFR BLD AUTO: 18.8 % (ref 19.6–45.3)
MCH RBC QN AUTO: 29.9 PG (ref 26.6–33)
MCHC RBC AUTO-ENTMCNC: 32.8 G/DL (ref 31.5–35.7)
MCV RBC AUTO: 90.9 FL (ref 79–97)
MONOCYTES # BLD AUTO: 0.47 10*3/MM3 (ref 0.1–0.9)
MONOCYTES NFR BLD AUTO: 4.8 % (ref 5–12)
NEUTROPHILS # BLD AUTO: 7.07 10*3/MM3 (ref 1.7–7)
NEUTROPHILS NFR BLD AUTO: 72.9 % (ref 42.7–76)
NRBC BLD AUTO-RTO: 0 /100 WBC (ref 0–0.2)
PLATELET # BLD AUTO: 241 10*3/MM3 (ref 140–450)
PMV BLD AUTO: 10.1 FL (ref 6–12)
POTASSIUM BLD-SCNC: 3.8 MMOL/L (ref 3.5–5.2)
PROT SERPL-MCNC: 7.2 G/DL (ref 6–8.5)
RBC # BLD AUTO: 3.75 10*6/MM3 (ref 3.77–5.28)
SODIUM BLD-SCNC: 143 MMOL/L (ref 136–145)
WBC NRBC COR # BLD: 9.7 10*3/MM3 (ref 3.4–10.8)

## 2019-12-04 PROCEDURE — 96372 THER/PROPH/DIAG INJ SC/IM: CPT

## 2019-12-04 PROCEDURE — 36415 COLL VENOUS BLD VENIPUNCTURE: CPT

## 2019-12-04 PROCEDURE — 99214 OFFICE O/P EST MOD 30 MIN: CPT | Performed by: INTERNAL MEDICINE

## 2019-12-04 PROCEDURE — 25010000002 EPOETIN ALFA PER 1000 UNITS: Performed by: INTERNAL MEDICINE

## 2019-12-04 PROCEDURE — 85025 COMPLETE CBC W/AUTO DIFF WBC: CPT | Performed by: INTERNAL MEDICINE

## 2019-12-04 PROCEDURE — 80053 COMPREHEN METABOLIC PANEL: CPT | Performed by: INTERNAL MEDICINE

## 2019-12-04 RX ADMIN — ERYTHROPOIETIN 40000 UNITS: 40000 INJECTION, SOLUTION INTRAVENOUS; SUBCUTANEOUS at 14:28

## 2019-12-04 NOTE — PROGRESS NOTES
[facility]  HEMATOLOGY & ONCOLOGY    Cancer Staging Information:  Cancer Staging  No matching staging information was found for the patient.      Subjective     VISIT DIAGNOSIS:   Encounter Diagnosis   Name Primary?   • Anemia in stage 3 chronic kidney disease (CMS/HCC)        REASON FOR VISIT:     Chief Complaint   Patient presents with   • Anemia     here for procrit         HEMATOLOGY / ONCOLOGY HISTORY:    No history exists.           INTERVAL HISTORY  Patient ID: Maria C Shrestha is a 59 y.o. year old female Cancer Staging    12/3/19: she has a lot of family stressors that is making her depressed. Denies sob,cp,n/v/dizziness.occassional blood streaked stool from hemorrhoids.  --rest of ros unremarkable. Physical exam unremarkable.    Past Medical History:   Past Medical History:   Diagnosis Date   • Acquired hypothyroidism 2/6/2017   • Allergic    • Anemia    • Anxiety    • Arthritis    • Cellulitis    • Chronic kidney disease     3rd stage   • Depression    • Disease of thyroid gland    • Dyslipidemia    • Elevated cholesterol    • Essential hypertension    • Fatigue    • Gallbladder abscess    • Hearing loss    • Light headed    • Limited range of motion (ROM) of shoulder     right   • Obesity    • Palpitation    • Short of breath on exertion    • Sleep apnea    • Type 2 diabetes mellitus (CMS/HCC)    • Type II diabetes mellitus, uncontrolled (CMS/HCC)    • Wears glasses      Past Surgical History:   Past Surgical History:   Procedure Laterality Date   • ADENOIDECTOMY     • ANAL FISTULA REPAIR      x 2    • CHOLECYSTECTOMY     • COLONOSCOPY  01/02/2014   • COLONOSCOPY N/A 3/22/2017    Procedure: COLONOSCOPY WITH ANESTHESIA;  Surgeon: Mono Linder MD;  Location: [facility] ENDOSCOPY;  Service:    • D&C HYSTEROSCOPY N/A 7/25/2018    Procedure: DILATATION AND CURETTAGE HYSTEROSCOPY;  Surgeon: Michael Castaneda MD;  Location: [facility] OR;  Service: Obstetrics/Gynecology   • DILATATION AND  CURETTAGE      X 2   • ENDOSCOPY     • TONSILLECTOMY       Social History:   Social History     Socioeconomic History   • Marital status:      Spouse name: Not on file   • Number of children: Not on file   • Years of education: Not on file   • Highest education level: Not on file   Tobacco Use   • Smoking status: Never Smoker   • Smokeless tobacco: Never Used   Substance and Sexual Activity   • Alcohol use: No   • Drug use: No   • Sexual activity: No     Birth control/protection: Post-menopausal     Family History:   Family History   Problem Relation Age of Onset   • Diabetes Other    • Cancer Mother    • Cancer Father    • Heart disease Father    • Diabetes Father    • Obesity Father    • Stroke Father    • Cancer Maternal Grandmother    • Uterine cancer Maternal Grandmother    • Diabetes Maternal Grandfather    • Cancer Paternal Grandmother    • Colon cancer Paternal Grandmother    • Diabetes Paternal Grandfather    • Heart disease Paternal Grandfather    • No Known Problems Sister    • No Known Problems Brother    • No Known Problems Daughter    • No Known Problems Son    • No Known Problems Maternal Aunt    • No Known Problems Paternal Aunt    • BRCA 1/2 Neg Hx    • Breast cancer Neg Hx    • Endometrial cancer Neg Hx    • Ovarian cancer Neg Hx        Review of Systems   See HPI otherwise unremarkable.  Performance Status:  Asymptomatic    Medications:    Current Outpatient Medications   Medication Sig Dispense Refill   • allopurinol (ZYLOPRIM) 100 MG tablet Take 100 mg by mouth 2 (Two) Times a Day.     • amLODIPine (NORVASC) 5 MG tablet      • aspirin 81 MG tablet Take 81 mg by mouth Daily. Stop 7/22/2018     • busPIRone (BUSPAR) 10 MG tablet buspirone 10 mg tablet   Take 2 tablets twice a day by oral route as needed for 90 days.     • carvedilol (COREG) 3.125 MG tablet Take 3.125 mg by mouth 2 (Two) Times a Day.     • cetirizine (zyrTEC) 10 MG tablet Take 10 mg by mouth As Needed.     • Cholecalciferol  "(VITAMIN D3) 35659 units tablet Vitamin D2 50,000 unit capsule   Take 1 capsule every week by oral route. Sunday     • cinacalcet (SENSIPAR) 30 MG tablet Take 1 tablet by mouth Daily. 30 tablet 3   • citalopram (CeleXA) 20 MG tablet Take 20 mg by mouth Daily.     • diazePAM (VALIUM) 2 MG tablet Take 2 mg by mouth As Needed for Anxiety.     • Dulaglutide (TRULICITY) 1.5 MG/0.5ML solution pen-injector Inject 1.5 mg under the skin into the appropriate area as directed Every 7 (Seven) Days. 6 mL 3   • Ergocalciferol (VITAMIN D2 PO) Take 1,250 mcg by mouth 1 (One) Time Per Week. VITAMIN D2 1250 MCG (50,000 UNIT) CAPSULE TAKE 1 CAPSULE EVERY WEEK BY ORAL ROUTE     • fluticasone (VERAMYST) 27.5 MCG/SPRAY nasal spray 2 sprays into each nostril Daily.     • Glucose Blood (BLOOD GLUCOSE TEST) strip Use 4 x daily, use any brand covered by insurance or same brand as before 360 each 3   • ibuprofen (ADVIL,MOTRIN) 800 MG tablet Take 800 mg by mouth Every 8 (Eight) Hours As Needed for Mild Pain .     • insulin glargine (LANTUS) 100 UNIT/ML injection INJECT 200 UNITS DAILY 180 mL 3   • Insulin Lispro (HUMALOG KWIKPEN) 200 UNIT/ML solution pen-injector Inject 30 Units under the skin into the appropriate area as directed 3 (Three) Times a Day With Meals. 5 pen 11   • Insulin Pen Needle (PEN NEEDLES) 31G X 6 MM misc Use to inject insulin 4 times daily. 200 each 11   • Insulin Syringe 31G X 5/16\" 1 ML misc 4 x daily 120 each 11   • lamoTRIgine (LaMICtal) 50 MG tablet dispersible disintegrating tablet Take 50 mg by mouth Every Night.     • Lancets (FREESTYLE) lancets FOUR TIMES A  each 11   • levothyroxine (SYNTHROID) 75 MCG tablet Take 1 tablet by mouth Daily. 30 tablet 11   • lisinopril (PRINIVIL,ZESTRIL) 20 MG tablet Take 40 mg by mouth 2 (Two) Times a Day.     • Melatonin 10 MG tablet Take  by mouth.     • Multiple Vitamins-Minerals (MULTIVITAMIN ADULT PO) Take 1 tablet by mouth Daily.     • potassium gluconate 595 (99 K) MG " "tablet tablet potassium gluconate 595 mg (99 mg) tablet   Take 1 tablets every day by oral route.     • rosuvastatin (CRESTOR) 10 MG tablet Take 10 mg by mouth Daily.     • sodium bicarbonate 650 MG tablet Take 650 mg by mouth 4 (Four) Times a Day.     • traMADol (ULTRAM) 50 MG tablet Take 1 tablet by mouth Every 6 (Six) Hours As Needed for Moderate Pain . 8 tablet 0     No current facility-administered medications for this visit.        ALLERGIES:    Allergies   Allergen Reactions   • Codeine Nausea Only       Objective      Vitals:    12/04/19 1333   BP: 150/90   Pulse: 71   Resp: 16   Temp: 97.6 °F (36.4 °C)   SpO2: 99%   Weight: (!) 178 kg (393 lb 6.4 oz)   Height: 170.2 cm (67\")   PainSc: 0-No pain         Current Status 12/4/2019   ECOG score 1         Physical Examremains the same  General Appearance:obese female.  Patient is awake, alert, oriented and in no acute distress. Patient is welldeveloped, wellnourished, and appears stated age.  HEENT: Normocephalic. Sclerae clear, conjunctiva pink, extraocular movements intact, pupils, round, reactive to light and  accommodation. Mouth and throat are clear with moist oral mucosa.  NECK: Supple, no jugular venous distention, thyroid not enlarged.  LYMPH: No cervical, supraclavicular, axillary, or inguinal lymphadenopathy.  CHEST: Equal bilateral expansion, AP  diameter normal, resonant percussion note  LUNGS: Good air movement, no rales, rhonchi, rubs or wheezes with auscultation  CARDIO: Regular sinus rhythm, no murmurs, gallops or rubs.  ABDOMEN: obese abdomen. Nondistended, soft, No tenderness, no guarding, no rebound, No hepatosplenomegaly. No abdominal masses. Bowel sounds positive. No hernia  GENITALIA: Not examined.  BREASTS: Not examined.  MUSKEL: No joint swelling, decreased motion, or inflammation  EXTREMS: kristie YAZAN. No clubbing, cyanosis, No varicose veins.  NEURO: Grossly nonfocal, Gait is coordinated and smooth, Cognition is preserved.  SKIN: No rashes, " no ecchymoses, no petechia.  PSYCH: Oriented to time, place and person. Memory is preserved. Mood and affect appear normal  RECENT LABS:  Lab on 12/04/2019   Component Date Value Ref Range Status   • Glucose 12/04/2019 250* 65 - 99 mg/dL Final   • BUN 12/04/2019 38* 6 - 20 mg/dL Final   • Creatinine 12/04/2019 2.16* 0.57 - 1.00 mg/dL Final   • Sodium 12/04/2019 143  136 - 145 mmol/L Final   • Potassium 12/04/2019 3.8  3.5 - 5.2 mmol/L Final   • Chloride 12/04/2019 106  98 - 107 mmol/L Final   • CO2 12/04/2019 24.0  22.0 - 29.0 mmol/L Final   • Calcium 12/04/2019 8.9  8.6 - 10.5 mg/dL Final   • Total Protein 12/04/2019 7.2  6.0 - 8.5 g/dL Final   • Albumin 12/04/2019 3.60  3.50 - 5.20 g/dL Final   • ALT (SGPT) 12/04/2019 11  1 - 33 U/L Final   • AST (SGOT) 12/04/2019 12  1 - 32 U/L Final   • Alkaline Phosphatase 12/04/2019 84  39 - 117 U/L Final   • Total Bilirubin 12/04/2019 0.2  0.2 - 1.2 mg/dL Final   • eGFR Non African Amer 12/04/2019 23* >60 mL/min/1.73 Final   • Globulin 12/04/2019 3.6  gm/dL Final   • A/G Ratio 12/04/2019 1.0  g/dL Final   • BUN/Creatinine Ratio 12/04/2019 17.6  7.0 - 25.0 Final   • Anion Gap 12/04/2019 13.0  5.0 - 15.0 mmol/L Final   • WBC 12/04/2019 9.70  3.40 - 10.80 10*3/mm3 Final   • RBC 12/04/2019 3.75* 3.77 - 5.28 10*6/mm3 Final   • Hemoglobin 12/04/2019 11.2* 12.0 - 15.9 g/dL Final   • Hematocrit 12/04/2019 34.1  34.0 - 46.6 % Final   • MCV 12/04/2019 90.9  79.0 - 97.0 fL Final   • MCH 12/04/2019 29.9  26.6 - 33.0 pg Final   • MCHC 12/04/2019 32.8  31.5 - 35.7 g/dL Final   • RDW 12/04/2019 16.3* 12.3 - 15.4 % Final   • RDW-SD 12/04/2019 54.5* 37.0 - 54.0 fl Final   • MPV 12/04/2019 10.1  6.0 - 12.0 fL Final   • Platelets 12/04/2019 241  140 - 450 10*3/mm3 Final   • Neutrophil % 12/04/2019 72.9  42.7 - 76.0 % Final   • Lymphocyte % 12/04/2019 18.8* 19.6 - 45.3 % Final   • Monocyte % 12/04/2019 4.8* 5.0 - 12.0 % Final   • Eosinophil % 12/04/2019 2.3  0.3 - 6.2 % Final   • Basophil %  12/04/2019 0.5  0.0 - 1.5 % Final   • Immature Grans % 12/04/2019 0.7* 0.0 - 0.5 % Final   • Neutrophils, Absolute 12/04/2019 7.07* 1.70 - 7.00 10*3/mm3 Final   • Lymphocytes, Absolute 12/04/2019 1.82  0.70 - 3.10 10*3/mm3 Final   • Monocytes, Absolute 12/04/2019 0.47  0.10 - 0.90 10*3/mm3 Final   • Eosinophils, Absolute 12/04/2019 0.22  0.00 - 0.40 10*3/mm3 Final   • Basophils, Absolute 12/04/2019 0.05  0.00 - 0.20 10*3/mm3 Final   • Immature Grans, Absolute 12/04/2019 0.07* 0.00 - 0.05 10*3/mm3 Final   • nRBC 12/04/2019 0.0  0.0 - 0.2 /100 WBC Final   • Extra Tube 12/04/2019 Hold for add-ons.   Final    Auto resulted.   • Extra Tube 12/04/2019 Hold for add-ons.   Final    Auto resulted.       RADIOLOGY:  No results found.         Assessment/Plan  Maria C Shrestha is a 59 y.o. year old female  Whom we are seeing for AoCKD on procrit that is stable.    Patient Active Problem List   Diagnosis   • Type 2 diabetes mellitus with stage 3 chronic kidney disease, with long-term current use of insulin (CMS/HCC)   • Mixed diabetic hyperlipidemia associated with type 2 diabetes mellitus (CMS/HCC)   • Hypertension associated with diabetes (CMS/HCC)   • Vitamin D deficiency   • B12 deficiency   • Anemia in CKD (chronic kidney disease)   • Acquired hypothyroidism   • Chronic kidney disease, stage 4 (severe) (CMS/HCC)   • Anemia   • Anxiety   • Deep venous thrombosis of lower extremity (CMS/HCC)   • Disease of liver   • Embolism (CMS/HCC)   • Hypertriglyceridemia   • Hypocalcemia   • Mood disorder (CMS/HCC)   • Morbid obesity (CMS/HCC)   • Sleep apnea   • Acute renal failure superimposed on stage 3 chronic kidney disease (CMS/HCC)   • Anemia of chronic renal failure   • Hypertension   • Diabetes mellitus (CMS/HCC)   • Thyroid disease   • Hypothyroidism   • Type 2 DM with CKD stage 3 and hypertension (CMS/HCC)   • Vitamin D deficiency          1. AoCKD: s/p iron infusions. Labs reviewed with the pt cr 2.16, wbc 9.70, Hg 11.2, plt  241.  - Ok for procrit shot today. Goal Hg is >10 and <12.  -iron 77, %sat 28. Iron not indicated      2. HTN: on amlodipine, carvedilol, lisinopril    3.DM: on trulicity    4.Hypothyroidism: on synthroid  5. Anxiety/depression: on diazepam, celexa  6. Hyperlipidemia: on crestor.  7. CKD stage 4: sees renal. grf 24          Rodrigo Huitron MD    12/4/2019    1:50 PM

## 2019-12-18 ENCOUNTER — TRANSCRIBE ORDERS (OUTPATIENT)
Dept: LAB | Facility: HOSPITAL | Age: 59
End: 2019-12-18

## 2019-12-18 ENCOUNTER — LAB (OUTPATIENT)
Dept: LAB | Facility: HOSPITAL | Age: 59
End: 2019-12-18

## 2019-12-18 DIAGNOSIS — E03.9 HYPOTHYROIDISM, UNSPECIFIED TYPE: ICD-10-CM

## 2019-12-18 DIAGNOSIS — E11.69 TYPE 2 DIABETES MELLITUS WITH OTHER SPECIFIED COMPLICATION, UNSPECIFIED WHETHER LONG TERM INSULIN USE (HCC): ICD-10-CM

## 2019-12-18 DIAGNOSIS — E78.00 PURE HYPERCHOLESTEROLEMIA: ICD-10-CM

## 2019-12-18 DIAGNOSIS — E78.00 PURE HYPERCHOLESTEROLEMIA: Primary | ICD-10-CM

## 2019-12-18 LAB
ALBUMIN SERPL-MCNC: 3.5 G/DL (ref 3.5–5)
ALBUMIN/GLOB SERPL: 0.9 G/DL (ref 1.1–2.5)
ALP SERPL-CCNC: 81 U/L (ref 24–120)
ALT SERPL W P-5'-P-CCNC: 15 U/L (ref 0–54)
ANION GAP SERPL CALCULATED.3IONS-SCNC: 9 MMOL/L (ref 4–13)
AST SERPL-CCNC: 19 U/L (ref 7–45)
AUTO MIXED CELLS #: 0.6 10*3/MM3 (ref 0.1–2.6)
AUTO MIXED CELLS %: 6.6 % (ref 0.1–24)
BACTERIA UR QL AUTO: ABNORMAL /HPF
BILIRUB SERPL-MCNC: 0.4 MG/DL (ref 0.1–1)
BILIRUB UR QL STRIP: NEGATIVE
BUN BLD-MCNC: 42 MG/DL (ref 5–21)
BUN/CREAT SERPL: 17.7
CALCIUM SPEC-SCNC: 8.6 MG/DL (ref 8.4–10.4)
CHLORIDE SERPL-SCNC: 108 MMOL/L (ref 98–110)
CHOLEST SERPL-MCNC: 222 MG/DL (ref 130–200)
CLARITY UR: CLEAR
CO2 SERPL-SCNC: 24 MMOL/L (ref 24–31)
COLOR UR: YELLOW
CREAT BLD-MCNC: 2.37 MG/DL (ref 0.5–1.4)
ERYTHROCYTE [DISTWIDTH] IN BLOOD BY AUTOMATED COUNT: 16.6 % (ref 12.3–15.4)
GFR SERPL CREATININE-BSD FRML MDRD: 21 ML/MIN/1.73
GLOBULIN UR ELPH-MCNC: 3.7 GM/DL
GLUCOSE BLD-MCNC: 179 MG/DL (ref 70–100)
GLUCOSE UR STRIP-MCNC: ABNORMAL MG/DL
HBA1C MFR BLD: 7 % (ref 4.8–5.9)
HCT VFR BLD AUTO: 34.5 % (ref 34–46.6)
HDLC SERPL-MCNC: 39 MG/DL
HGB BLD-MCNC: 11.2 G/DL (ref 12–15.9)
HGB UR QL STRIP.AUTO: ABNORMAL
HYALINE CASTS UR QL AUTO: ABNORMAL /LPF
KETONES UR QL STRIP: NEGATIVE
LDLC SERPL CALC-MCNC: 114 MG/DL (ref 0–99)
LDLC/HDLC SERPL: 2.93 {RATIO}
LEUKOCYTE ESTERASE UR QL STRIP.AUTO: NEGATIVE
LYMPHOCYTES # BLD AUTO: 1.7 10*3/MM3 (ref 0.7–3.1)
LYMPHOCYTES NFR BLD AUTO: 18.8 % (ref 19.6–45.3)
MCH RBC QN AUTO: 29.4 PG (ref 26.6–33)
MCHC RBC AUTO-ENTMCNC: 32.5 G/DL (ref 31.5–35.7)
MCV RBC AUTO: 90.6 FL (ref 79–97)
NEUTROPHILS # BLD AUTO: 7 10*3/MM3 (ref 1.7–7)
NEUTROPHILS NFR BLD AUTO: 74.6 % (ref 42.7–76)
NITRITE UR QL STRIP: NEGATIVE
PH UR STRIP.AUTO: 6 [PH] (ref 5–8)
PLATELET # BLD AUTO: 214 10*3/MM3 (ref 140–450)
PMV BLD AUTO: 9.1 FL (ref 6–12)
POTASSIUM BLD-SCNC: 4.5 MMOL/L (ref 3.5–5.3)
PROT SERPL-MCNC: 7.2 G/DL (ref 6.3–8.7)
PROT UR QL STRIP: ABNORMAL
RBC # BLD AUTO: 3.81 10*6/MM3 (ref 3.77–5.28)
RBC # UR: ABNORMAL /HPF
REF LAB TEST METHOD: ABNORMAL
SODIUM BLD-SCNC: 141 MMOL/L (ref 135–145)
SP GR UR STRIP: 1.02 (ref 1–1.03)
SQUAMOUS #/AREA URNS HPF: ABNORMAL /HPF
TRIGL SERPL-MCNC: 343 MG/DL (ref 0–149)
UROBILINOGEN UR QL STRIP: ABNORMAL
VLDLC SERPL-MCNC: 68.6 MG/DL
WBC NRBC COR # BLD: 9.3 10*3/MM3 (ref 3.4–10.8)
WBC UR QL AUTO: ABNORMAL /HPF

## 2019-12-18 PROCEDURE — 36415 COLL VENOUS BLD VENIPUNCTURE: CPT

## 2019-12-18 PROCEDURE — 84443 ASSAY THYROID STIM HORMONE: CPT | Performed by: NURSE PRACTITIONER

## 2019-12-18 PROCEDURE — 83036 HEMOGLOBIN GLYCOSYLATED A1C: CPT

## 2019-12-18 PROCEDURE — 87086 URINE CULTURE/COLONY COUNT: CPT | Performed by: NURSE PRACTITIONER

## 2019-12-18 PROCEDURE — 81001 URINALYSIS AUTO W/SCOPE: CPT

## 2019-12-18 PROCEDURE — 80053 COMPREHEN METABOLIC PANEL: CPT

## 2019-12-18 PROCEDURE — 80061 LIPID PANEL: CPT

## 2019-12-18 PROCEDURE — 85025 COMPLETE CBC W/AUTO DIFF WBC: CPT

## 2019-12-19 LAB — TSH SERPL DL<=0.05 MIU/L-ACNC: 3.64 UIU/ML (ref 0.27–4.2)

## 2019-12-20 LAB — BACTERIA SPEC AEROBE CULT: NORMAL

## 2019-12-26 DIAGNOSIS — D63.1 ANEMIA IN STAGE 3 CHRONIC KIDNEY DISEASE (HCC): ICD-10-CM

## 2019-12-26 DIAGNOSIS — N18.30 ANEMIA IN STAGE 3 CHRONIC KIDNEY DISEASE (HCC): ICD-10-CM

## 2020-01-07 DIAGNOSIS — N18.30 ANEMIA IN STAGE 3 CHRONIC KIDNEY DISEASE (HCC): Primary | ICD-10-CM

## 2020-01-07 DIAGNOSIS — D63.1 ANEMIA IN STAGE 3 CHRONIC KIDNEY DISEASE (HCC): Primary | ICD-10-CM

## 2020-01-07 DIAGNOSIS — N18.4 CHRONIC KIDNEY DISEASE, STAGE 4 (SEVERE) (HCC): ICD-10-CM

## 2020-01-08 ENCOUNTER — LAB (OUTPATIENT)
Dept: LAB | Facility: HOSPITAL | Age: 60
End: 2020-01-08

## 2020-01-08 ENCOUNTER — INFUSION (OUTPATIENT)
Dept: ONCOLOGY | Facility: HOSPITAL | Age: 60
End: 2020-01-08

## 2020-01-08 ENCOUNTER — OFFICE VISIT (OUTPATIENT)
Dept: ONCOLOGY | Facility: CLINIC | Age: 60
End: 2020-01-08

## 2020-01-08 ENCOUNTER — APPOINTMENT (OUTPATIENT)
Dept: LAB | Facility: HOSPITAL | Age: 60
End: 2020-01-08

## 2020-01-08 VITALS
RESPIRATION RATE: 18 BRPM | DIASTOLIC BLOOD PRESSURE: 65 MMHG | TEMPERATURE: 98.1 F | BODY MASS INDEX: 48.82 KG/M2 | SYSTOLIC BLOOD PRESSURE: 162 MMHG | HEART RATE: 69 BPM | HEIGHT: 65 IN | WEIGHT: 293 LBS | OXYGEN SATURATION: 99 %

## 2020-01-08 VITALS
DIASTOLIC BLOOD PRESSURE: 65 MMHG | HEIGHT: 65 IN | TEMPERATURE: 96.9 F | HEART RATE: 80 BPM | OXYGEN SATURATION: 97 % | BODY MASS INDEX: 48.82 KG/M2 | RESPIRATION RATE: 16 BRPM | SYSTOLIC BLOOD PRESSURE: 152 MMHG | WEIGHT: 293 LBS

## 2020-01-08 DIAGNOSIS — D63.1 ANEMIA IN STAGE 3 CHRONIC KIDNEY DISEASE (HCC): Primary | ICD-10-CM

## 2020-01-08 DIAGNOSIS — N18.30 ANEMIA IN STAGE 3 CHRONIC KIDNEY DISEASE (HCC): Primary | ICD-10-CM

## 2020-01-08 DIAGNOSIS — N18.30 ANEMIA IN STAGE 3 CHRONIC KIDNEY DISEASE (HCC): ICD-10-CM

## 2020-01-08 DIAGNOSIS — D63.1 ANEMIA IN STAGE 3 CHRONIC KIDNEY DISEASE (HCC): ICD-10-CM

## 2020-01-08 LAB
25(OH)D3 SERPL-MCNC: 22.5 NG/ML (ref 30–100)
ALBUMIN SERPL-MCNC: 3.3 G/DL (ref 3.5–5)
ALBUMIN SERPL-MCNC: 3.4 G/DL (ref 3.5–5.2)
ALBUMIN UR-MCNC: >440 MG/DL
ALBUMIN/GLOB SERPL: 1 G/DL
ALBUMIN/GLOB SERPL: 1 G/DL (ref 1.1–2.5)
ALP SERPL-CCNC: 72 U/L (ref 39–117)
ALP SERPL-CCNC: 75 U/L (ref 24–120)
ALT SERPL W P-5'-P-CCNC: 11 U/L (ref 1–33)
ALT SERPL W P-5'-P-CCNC: 17 U/L (ref 0–54)
ANION GAP SERPL CALCULATED.3IONS-SCNC: 10 MMOL/L (ref 5–15)
ANION GAP SERPL CALCULATED.3IONS-SCNC: 6 MMOL/L (ref 4–13)
AST SERPL-CCNC: 12 U/L (ref 1–32)
AST SERPL-CCNC: 19 U/L (ref 7–45)
AUTO MIXED CELLS #: 0.3 10*3/MM3 (ref 0.1–2.6)
AUTO MIXED CELLS %: 3.5 % (ref 0.1–24)
BASOPHILS # BLD AUTO: 0.05 10*3/MM3 (ref 0–0.2)
BASOPHILS NFR BLD AUTO: 0.5 % (ref 0–1.5)
BILIRUB SERPL-MCNC: 0.3 MG/DL (ref 0.1–1)
BILIRUB SERPL-MCNC: <0.2 MG/DL (ref 0.2–1.2)
BUN BLD-MCNC: 33 MG/DL (ref 6–20)
BUN BLD-MCNC: 36 MG/DL (ref 5–21)
BUN/CREAT SERPL: 14.9 (ref 7–25)
BUN/CREAT SERPL: 16.4
CALCIUM SPEC-SCNC: 8.9 MG/DL (ref 8.4–10.4)
CALCIUM SPEC-SCNC: 9 MG/DL (ref 8.6–10.5)
CHLORIDE SERPL-SCNC: 107 MMOL/L (ref 98–107)
CHLORIDE SERPL-SCNC: 108 MMOL/L (ref 98–110)
CHOLEST SERPL-MCNC: 200 MG/DL (ref 130–200)
CO2 SERPL-SCNC: 25 MMOL/L (ref 24–31)
CO2 SERPL-SCNC: 27 MMOL/L (ref 22–29)
CREAT BLD-MCNC: 2.2 MG/DL (ref 0.5–1.4)
CREAT BLD-MCNC: 2.21 MG/DL (ref 0.57–1)
CREAT UR-MCNC: 87.3 MG/DL
DEPRECATED RDW RBC AUTO: 55.7 FL (ref 37–54)
EOSINOPHIL # BLD AUTO: 0.19 10*3/MM3 (ref 0–0.4)
EOSINOPHIL NFR BLD AUTO: 1.8 % (ref 0.3–6.2)
ERYTHROCYTE [DISTWIDTH] IN BLOOD BY AUTOMATED COUNT: 16.3 % (ref 12.3–15.4)
ERYTHROCYTE [DISTWIDTH] IN BLOOD BY AUTOMATED COUNT: 16.5 % (ref 12.3–15.4)
GFR SERPL CREATININE-BSD FRML MDRD: 23 ML/MIN/1.73
GFR SERPL CREATININE-BSD FRML MDRD: 23 ML/MIN/1.73
GLOBULIN UR ELPH-MCNC: 3.3 GM/DL
GLOBULIN UR ELPH-MCNC: 3.4 GM/DL
GLUCOSE BLD-MCNC: 132 MG/DL (ref 65–99)
GLUCOSE BLD-MCNC: 166 MG/DL (ref 70–100)
HBA1C MFR BLD: 7.6 % (ref 4.8–5.9)
HCT VFR BLD AUTO: 31.9 % (ref 34–46.6)
HCT VFR BLD AUTO: 32.2 % (ref 34–46.6)
HDLC SERPL-MCNC: 37 MG/DL
HGB BLD-MCNC: 10.6 G/DL (ref 12–15.9)
HGB BLD-MCNC: 10.7 G/DL (ref 12–15.9)
HOLD SPECIMEN: NORMAL
HOLD SPECIMEN: NORMAL
IMM GRANULOCYTES # BLD AUTO: 0.07 10*3/MM3 (ref 0–0.05)
IMM GRANULOCYTES NFR BLD AUTO: 0.6 % (ref 0–0.5)
LDLC SERPL CALC-MCNC: 98 MG/DL (ref 0–99)
LDLC/HDLC SERPL: 2.65 {RATIO}
LYMPHOCYTES # BLD AUTO: 1.7 10*3/MM3 (ref 0.7–3.1)
LYMPHOCYTES # BLD AUTO: 2 10*3/MM3 (ref 0.7–3.1)
LYMPHOCYTES NFR BLD AUTO: 18.5 % (ref 19.6–45.3)
LYMPHOCYTES NFR BLD AUTO: 18.6 % (ref 19.6–45.3)
MCH RBC QN AUTO: 29.9 PG (ref 26.6–33)
MCH RBC QN AUTO: 30.4 PG (ref 26.6–33)
MCHC RBC AUTO-ENTMCNC: 32.9 G/DL (ref 31.5–35.7)
MCHC RBC AUTO-ENTMCNC: 33.5 G/DL (ref 31.5–35.7)
MCV RBC AUTO: 90.6 FL (ref 79–97)
MCV RBC AUTO: 91 FL (ref 79–97)
MICROALBUMIN/CREAT UR: NORMAL MG/G{CREAT}
MONOCYTES # BLD AUTO: 0.66 10*3/MM3 (ref 0.1–0.9)
MONOCYTES NFR BLD AUTO: 6.1 % (ref 5–12)
NEUTROPHILS # BLD AUTO: 7.3 10*3/MM3 (ref 1.7–7)
NEUTROPHILS # BLD AUTO: 7.84 10*3/MM3 (ref 1.7–7)
NEUTROPHILS NFR BLD AUTO: 72.5 % (ref 42.7–76)
NEUTROPHILS NFR BLD AUTO: 77.9 % (ref 42.7–76)
NRBC BLD AUTO-RTO: 0 /100 WBC (ref 0–0.2)
PLATELET # BLD AUTO: 235 10*3/MM3 (ref 140–450)
PLATELET # BLD AUTO: 238 10*3/MM3 (ref 140–450)
PMV BLD AUTO: 10.3 FL (ref 6–12)
PMV BLD AUTO: 9.3 FL (ref 6–12)
POTASSIUM BLD-SCNC: 4.1 MMOL/L (ref 3.5–5.2)
POTASSIUM BLD-SCNC: 4.2 MMOL/L (ref 3.5–5.3)
PROT SERPL-MCNC: 6.7 G/DL (ref 6.3–8.7)
PROT SERPL-MCNC: 6.7 G/DL (ref 6–8.5)
RBC # BLD AUTO: 3.52 10*6/MM3 (ref 3.77–5.28)
RBC # BLD AUTO: 3.54 10*6/MM3 (ref 3.77–5.28)
SODIUM BLD-SCNC: 139 MMOL/L (ref 135–145)
SODIUM BLD-SCNC: 144 MMOL/L (ref 136–145)
TRIGL SERPL-MCNC: 324 MG/DL (ref 0–149)
TSH SERPL DL<=0.05 MIU/L-ACNC: 3.59 UIU/ML (ref 0.27–4.2)
VIT B12 BLD-MCNC: 811 PG/ML (ref 211–946)
VLDLC SERPL-MCNC: 64.8 MG/DL
WBC NRBC COR # BLD: 10.81 10*3/MM3 (ref 3.4–10.8)
WBC NRBC COR # BLD: 9.3 10*3/MM3 (ref 3.4–10.8)

## 2020-01-08 PROCEDURE — 99215 OFFICE O/P EST HI 40 MIN: CPT | Performed by: INTERNAL MEDICINE

## 2020-01-08 PROCEDURE — 82306 VITAMIN D 25 HYDROXY: CPT | Performed by: INTERNAL MEDICINE

## 2020-01-08 PROCEDURE — 36415 COLL VENOUS BLD VENIPUNCTURE: CPT | Performed by: INTERNAL MEDICINE

## 2020-01-08 PROCEDURE — 85025 COMPLETE CBC W/AUTO DIFF WBC: CPT | Performed by: INTERNAL MEDICINE

## 2020-01-08 PROCEDURE — 80053 COMPREHEN METABOLIC PANEL: CPT | Performed by: INTERNAL MEDICINE

## 2020-01-08 PROCEDURE — 80061 LIPID PANEL: CPT | Performed by: INTERNAL MEDICINE

## 2020-01-08 PROCEDURE — 84443 ASSAY THYROID STIM HORMONE: CPT | Performed by: INTERNAL MEDICINE

## 2020-01-08 PROCEDURE — 96372 THER/PROPH/DIAG INJ SC/IM: CPT

## 2020-01-08 PROCEDURE — 82607 VITAMIN B-12: CPT | Performed by: INTERNAL MEDICINE

## 2020-01-08 PROCEDURE — 82570 ASSAY OF URINE CREATININE: CPT | Performed by: INTERNAL MEDICINE

## 2020-01-08 PROCEDURE — 82043 UR ALBUMIN QUANTITATIVE: CPT | Performed by: INTERNAL MEDICINE

## 2020-01-08 PROCEDURE — 83036 HEMOGLOBIN GLYCOSYLATED A1C: CPT | Performed by: INTERNAL MEDICINE

## 2020-01-08 PROCEDURE — 25010000002 EPOETIN ALFA-EPBX 40000 UNIT/ML SOLUTION: Performed by: INTERNAL MEDICINE

## 2020-01-08 RX ORDER — CHLORAL HYDRATE 500 MG
2000 CAPSULE ORAL
COMMUNITY
End: 2022-02-09

## 2020-01-08 RX ADMIN — EPOETIN ALFA-EPBX 40000 UNITS: 40000 INJECTION, SOLUTION INTRAVENOUS; SUBCUTANEOUS at 14:28

## 2020-01-08 NOTE — PROGRESS NOTES
1421 Ramona RN called to report doctor wants patient to receive procrit today trying to keep patient hgb above 11. Fadumo Argueta RN

## 2020-01-15 ENCOUNTER — OFFICE VISIT (OUTPATIENT)
Dept: ENDOCRINOLOGY | Facility: CLINIC | Age: 60
End: 2020-01-15

## 2020-01-15 VITALS
BODY MASS INDEX: 48.82 KG/M2 | DIASTOLIC BLOOD PRESSURE: 80 MMHG | SYSTOLIC BLOOD PRESSURE: 138 MMHG | OXYGEN SATURATION: 94 % | HEART RATE: 86 BPM | WEIGHT: 293 LBS | HEIGHT: 65 IN

## 2020-01-15 DIAGNOSIS — N18.30 TYPE 2 DIABETES MELLITUS WITH STAGE 3 CHRONIC KIDNEY DISEASE, WITH LONG-TERM CURRENT USE OF INSULIN (HCC): Primary | ICD-10-CM

## 2020-01-15 DIAGNOSIS — E11.22 TYPE 2 DIABETES MELLITUS WITH STAGE 3 CHRONIC KIDNEY DISEASE, WITH LONG-TERM CURRENT USE OF INSULIN (HCC): Primary | ICD-10-CM

## 2020-01-15 DIAGNOSIS — E53.8 B12 DEFICIENCY: ICD-10-CM

## 2020-01-15 DIAGNOSIS — E55.9 VITAMIN D DEFICIENCY: ICD-10-CM

## 2020-01-15 DIAGNOSIS — I65.23 BILATERAL CAROTID ARTERY STENOSIS: ICD-10-CM

## 2020-01-15 DIAGNOSIS — E78.2 MIXED DIABETIC HYPERLIPIDEMIA ASSOCIATED WITH TYPE 2 DIABETES MELLITUS (HCC): ICD-10-CM

## 2020-01-15 DIAGNOSIS — E11.59 HYPERTENSION ASSOCIATED WITH DIABETES (HCC): ICD-10-CM

## 2020-01-15 DIAGNOSIS — I65.23 CAROTID ARTERY PLAQUE, BILATERAL: ICD-10-CM

## 2020-01-15 DIAGNOSIS — M81.0 OSTEOPOROSIS, UNSPECIFIED OSTEOPOROSIS TYPE, UNSPECIFIED PATHOLOGICAL FRACTURE PRESENCE: ICD-10-CM

## 2020-01-15 DIAGNOSIS — Z79.4 TYPE 2 DIABETES MELLITUS WITH STAGE 3 CHRONIC KIDNEY DISEASE, WITH LONG-TERM CURRENT USE OF INSULIN (HCC): Primary | ICD-10-CM

## 2020-01-15 DIAGNOSIS — E03.9 ACQUIRED HYPOTHYROIDISM: ICD-10-CM

## 2020-01-15 DIAGNOSIS — E11.69 MIXED DIABETIC HYPERLIPIDEMIA ASSOCIATED WITH TYPE 2 DIABETES MELLITUS (HCC): ICD-10-CM

## 2020-01-15 DIAGNOSIS — I15.2 HYPERTENSION ASSOCIATED WITH DIABETES (HCC): ICD-10-CM

## 2020-01-15 PROCEDURE — 99214 OFFICE O/P EST MOD 30 MIN: CPT | Performed by: INTERNAL MEDICINE

## 2020-01-15 PROCEDURE — 95249 CONT GLUC MNTR PT PROV EQP: CPT | Performed by: INTERNAL MEDICINE

## 2020-01-15 NOTE — PROGRESS NOTES
Maria C Shrestha is a 59 y.o. female who presents for  evaluation of   Chief Complaint   Patient presents with   • Diabetes         Primary Care / Referring Provider  Ole Soliman MD    History of Present Illness  Duration/Timing:  Diabetes mellitus type 2  timing constant    quality controlled     severity high     Severity (Complications/Hospitalizations)  Secondary Microvascular Complications:  Diabetic Nephropathy, No Diabetic Neuropathy    Macrovascular  , Carotid Artery Disease      Context  Diabetes Regimen:  Insulin, Compliant with regimen  Blood Glucose Readings  Mostly at goal but frequent hypoglycemia since starting keto diet     Diet    counts carbs, eating only 45 g cho per meal     Exercise:  Does not exercise    Associated Signs/Symptoms  Hyperglycemic Symptoms:  No polyuria, No polydipsia, No polyphagia, Weight loss with keto diet   Hypoglycemic Episodes:  No documented hypoglycemia    Past Medical History:   Diagnosis Date   • Acquired hypothyroidism 2/6/2017   • Allergic    • Anemia    • Anxiety    • Arthritis    • Cellulitis    • Chronic kidney disease     3rd stage   • Depression    • Disease of thyroid gland    • Dyslipidemia    • Elevated cholesterol    • Essential hypertension    • Fatigue    • Gallbladder abscess    • Hearing loss    • Light headed    • Limited range of motion (ROM) of shoulder     right   • Obesity    • Palpitation    • Short of breath on exertion    • Sleep apnea    • Type 2 diabetes mellitus (CMS/HCC)    • Type II diabetes mellitus, uncontrolled (CMS/HCC)    • Wears glasses      Family History   Problem Relation Age of Onset   • Diabetes Other    • Cancer Mother    • Cancer Father    • Heart disease Father    • Diabetes Father    • Obesity Father    • Stroke Father    • Cancer Maternal Grandmother    • Uterine cancer Maternal Grandmother    • Diabetes Maternal Grandfather    • Cancer Paternal Grandmother    • Colon cancer Paternal Grandmother    • Diabetes Paternal  Grandfather    • Heart disease Paternal Grandfather    • No Known Problems Sister    • No Known Problems Brother    • No Known Problems Daughter    • No Known Problems Son    • No Known Problems Maternal Aunt    • No Known Problems Paternal Aunt    • BRCA 1/2 Neg Hx    • Breast cancer Neg Hx    • Endometrial cancer Neg Hx    • Ovarian cancer Neg Hx      Social History     Tobacco Use   • Smoking status: Never Smoker   • Smokeless tobacco: Never Used   Substance Use Topics   • Alcohol use: No   • Drug use: No         Current Outpatient Medications:   •  allopurinol (ZYLOPRIM) 100 MG tablet, Take 100 mg by mouth 2 (Two) Times a Day., Disp: , Rfl:   •  amLODIPine (NORVASC) 5 MG tablet, , Disp: , Rfl:   •  aspirin 81 MG tablet, Take 81 mg by mouth Daily. Stop 7/22/2018, Disp: , Rfl:   •  busPIRone (BUSPAR) 10 MG tablet, buspirone 10 mg tablet  Take 2 tablets twice a day by oral route as needed for 90 days., Disp: , Rfl:   •  carvedilol (COREG) 3.125 MG tablet, Take 3.125 mg by mouth 2 (Two) Times a Day., Disp: , Rfl:   •  cetirizine (zyrTEC) 10 MG tablet, Take 10 mg by mouth As Needed., Disp: , Rfl:   •  Cholecalciferol (VITAMIN D3) 05484 units tablet, Vitamin D2 50,000 unit capsule  Take 1 capsule every week by oral route. Sunday, Disp: , Rfl:   •  cinacalcet (SENSIPAR) 30 MG tablet, Take 1 tablet by mouth Daily., Disp: 30 tablet, Rfl: 3  •  citalopram (CeleXA) 20 MG tablet, Take 20 mg by mouth Daily., Disp: , Rfl:   •  diazePAM (VALIUM) 2 MG tablet, Take 2 mg by mouth As Needed for Anxiety., Disp: , Rfl:   •  Dulaglutide (TRULICITY) 1.5 MG/0.5ML solution pen-injector, Inject 1.5 mg under the skin into the appropriate area as directed Every 7 (Seven) Days., Disp: 6 mL, Rfl: 3  •  Ergocalciferol (VITAMIN D2 PO), Take 1,250 mcg by mouth 1 (One) Time Per Week. VITAMIN D2 1250 MCG (50,000 UNIT) CAPSULE TAKE 1 CAPSULE EVERY WEEK BY ORAL ROUTE, Disp: , Rfl:   •  fluticasone (VERAMYST) 27.5 MCG/SPRAY nasal spray, 2 sprays  "into each nostril Daily., Disp: , Rfl:   •  Glucose Blood (BLOOD GLUCOSE TEST) strip, Use 4 x daily, use any brand covered by insurance or same brand as before, Disp: 360 each, Rfl: 3  •  ibuprofen (ADVIL,MOTRIN) 800 MG tablet, Take 800 mg by mouth Every 8 (Eight) Hours As Needed for Mild Pain ., Disp: , Rfl:   •  insulin glargine (LANTUS) 100 UNIT/ML injection, INJECT 200 UNITS DAILY, Disp: 180 mL, Rfl: 3  •  Insulin Lispro (HUMALOG KWIKPEN) 200 UNIT/ML solution pen-injector, Inject 80 Units under the skin into the appropriate area as directed 3 (Three) Times a Day With Meals., Disp: 36 pen, Rfl: 11  •  Insulin Pen Needle (PEN NEEDLES) 31G X 6 MM misc, Use to inject insulin 4 times daily., Disp: 200 each, Rfl: 11  •  Insulin Syringe 31G X 5/16\" 1 ML misc, 4 x daily, Disp: 120 each, Rfl: 11  •  lamoTRIgine (LaMICtal) 50 MG tablet dispersible disintegrating tablet, Take 50 mg by mouth Every Night., Disp: , Rfl:   •  Lancets (FREESTYLE) lancets, FOUR TIMES A DAY, Disp: 150 each, Rfl: 11  •  levothyroxine (SYNTHROID) 75 MCG tablet, Take 1 tablet by mouth Daily., Disp: 30 tablet, Rfl: 11  •  lisinopril (PRINIVIL,ZESTRIL) 20 MG tablet, Take 40 mg by mouth 2 (Two) Times a Day., Disp: , Rfl:   •  Melatonin 10 MG tablet, Take  by mouth., Disp: , Rfl:   •  Multiple Vitamins-Minerals (MULTIVITAMIN ADULT PO), Take 1 tablet by mouth Daily., Disp: , Rfl:   •  Omega-3 Fatty Acids (FISH OIL) 1000 MG capsule capsule, Take 2,000 mg by mouth Daily With Dinner., Disp: , Rfl:   •  potassium gluconate 595 (99 K) MG tablet tablet, potassium gluconate 595 mg (99 mg) tablet  Take 1 tablets every day by oral route., Disp: , Rfl:   •  rosuvastatin (CRESTOR) 10 MG tablet, Take 10 mg by mouth Daily., Disp: , Rfl:   •  sodium bicarbonate 650 MG tablet, Take 650 mg by mouth 4 (Four) Times a Day., Disp: , Rfl:   •  traMADol (ULTRAM) 50 MG tablet, Take 1 tablet by mouth Every 6 (Six) Hours As Needed for Moderate Pain ., Disp: 8 tablet, Rfl: 0  •  " Evolocumab 140 MG/ML solution auto-injector, Inject 1 mL under the skin into the appropriate area as directed Every 14 (Fourteen) Days for 24 doses. NDC [address]-01, Disp: 6 mL, Rfl: 10    Review of Systems    Review of Systems   Constitutional: Positive for unexpected weight change. Negative for activity change, appetite change, chills, diaphoresis, fatigue and fever.   HENT: Negative for congestion, drooling, ear discharge, ear pain, facial swelling, mouth sores, nosebleeds, postnasal drip, sinus pressure, sneezing, sore throat, tinnitus, trouble swallowing and voice change.    Eyes: Negative.  Negative for photophobia, pain, discharge, redness and itching.   Respiratory: Negative.  Negative for apnea, cough, choking, chest tightness, shortness of breath, wheezing and stridor.    Cardiovascular: Negative.  Negative for chest pain, palpitations and leg swelling.   Gastrointestinal: Negative.  Negative for abdominal distention, abdominal pain, constipation, diarrhea, nausea and vomiting.   Endocrine: Positive for polydipsia, polyphagia and polyuria. Negative for cold intolerance and heat intolerance.   Genitourinary: Negative for difficulty urinating, dysuria, flank pain and frequency.   Musculoskeletal: Positive for arthralgias, back pain and myalgias. Negative for gait problem, joint swelling, neck pain and neck stiffness.   Skin: Negative for color change, pallor, rash and wound.   Allergic/Immunologic: Negative for environmental allergies, food allergies and immunocompromised state.   Neurological: Negative for dizziness, tremors, seizures, syncope, facial asymmetry, speech difficulty, weakness, light-headedness, numbness and headaches.   Hematological: Negative for adenopathy. Does not bruise/bleed easily.   Psychiatric/Behavioral: Negative for agitation, behavioral problems, confusion, decreased concentration, dysphoric mood, hallucinations, self-injury, sleep disturbance and suicidal ideas. The patient is  "not nervous/anxious and is not hyperactive.         Objective:     /80   Pulse 86   Ht 165.1 cm (65\")   Wt (!) 181 kg (398 lb 6.4 oz)   LMP  (LMP Unknown)   SpO2 94%   BMI 66.30 kg/m²     Physical Exam   Constitutional: She is oriented to person, place, and time. She appears well-developed.   HENT:   Head: Normocephalic.   Right Ear: External ear normal.   Left Ear: External ear normal.   Nose: Nose normal.   Eyes: Conjunctivae and EOM are normal. No scleral icterus.   Neck: Normal range of motion. Neck supple. No tracheal deviation present. No thyromegaly present.   Cardiovascular: Normal rate, regular rhythm and intact distal pulses. Exam reveals no gallop and no friction rub.   Murmur heard.  Systolic murmur, carotid bruit    Pulmonary/Chest: Effort normal and breath sounds normal. No stridor. No respiratory distress. She has no wheezes. She has no rales. She exhibits no tenderness.   Abdominal: Soft. Bowel sounds are normal. She exhibits no distension and no mass. There is no tenderness. There is no rebound and no guarding.   Musculoskeletal: Normal range of motion. She exhibits no tenderness or deformity.   Lymphadenopathy:     She has no cervical adenopathy.   Neurological: She is alert and oriented to person, place, and time. She displays normal reflexes. She exhibits normal muscle tone. Coordination normal.   Skin: No rash noted. No erythema. No pallor.   Psychiatric: She has a normal mood and affect. Her behavior is normal. Judgment and thought content normal.       Lab Review            Assessment/Plan       ICD-10-CM ICD-9-CM   1. Type 2 diabetes mellitus with stage 3 chronic kidney disease, with long-term current use of insulin (CMS/[facility]) E11.22 250.40    N18.3 585.3    Z79.4 V58.67   2. Mixed diabetic hyperlipidemia associated with type 2 diabetes mellitus (CMS/[facility]) E11.69 250.80    E78.2 272.2   3. Hypertension associated with diabetes (CMS/[facility]) E11.59 250.80    I10 401.9   4. B12 deficiency " E53.8 266.2   5. Vitamin D deficiency E55.9 268.9   6. Acquired hypothyroidism E03.9 244.9   7. Osteoporosis, unspecified osteoporosis type, unspecified pathological fracture presence M81.0 733.00   8. Bilateral carotid artery stenosis I65.23 433.10     433.30   9. Carotid artery plaque, bilateral I65.23 433.10     433.30       Glycemic Management:   Lab Results   Component Value Date    HGBA1C 7.6 (H) 01/08/2020    HGBA1C 7.0 (H) 12/18/2019    HGBA1C 6.7 (H) 09/04/2019     Lab Results   Component Value Date    GLUCOSE 132 (H) 01/08/2020    BUN 33 (H) 01/08/2020    CREATININE 2.21 (H) 01/08/2020    EGFRIFNONA 23 (L) 01/08/2020    BCR 14.9 01/08/2020    CO2 27.0 01/08/2020    CALCIUM 9.0 01/08/2020    ALBUMIN 3.40 (L) 01/08/2020    AST 12 01/08/2020    ALT 11 01/08/2020     Lab Results   Component Value Date    WBC 10.81 (H) 01/08/2020    HGB 10.6 (L) 01/08/2020    HCT 32.2 (L) 01/08/2020    MCV 91.0 01/08/2020     01/08/2020     Lab Results   Component Value Date    CREATININE 2.21 (H) 01/08/2020    CREATININE 2.20 (H) 01/08/2020    CREATININE 2.37 (H) 12/18/2019    CREATININE 2.16 (H) 12/04/2019    CREATININE 2.10 (H) 11/06/2019          Trulicity 1.5 mg weekly       Humulin U 500 before     Lantus 160 at night     invokana , I wanted to add but GFR less than 30     Will give rx for u200 humalog up to 20-40 units with meals -- needs up to 80 w meals       avg deidra of 167   In range 66%  No lows      Lipid Management    Lab Results   Component Value Date    TRIG 324 (H) 01/08/2020    TRIG 343 (H) 12/18/2019    TRIG 374 (H) 09/04/2019     Lab Results   Component Value Date    HDL 37 (L) 01/08/2020    HDL 39 (L) 12/18/2019    HDL 35 (L) 09/04/2019     No components found for: LDLCALC  Lab Results   Component Value Date    LDL 98 01/08/2020     (H) 12/18/2019    LDL 70 09/04/2019     Lab Results   Component Value Date    LDL 98 01/08/2020     (H) 12/18/2019    LDL 70 09/04/2019         on Crestor  20 mg daily   Generic causing myalgias, higher doses not tolerated    Need to add repatha since LDL goal is less than 70 due to carotid artery disease and already on maximal tolerated statin and intolerant to zetia       Blood Pressure Management:    Vitals:    01/15/20 1019   BP: 138/80   Pulse: 86   SpO2: 94%         Per nephrology       Microvascular Complication Monitoring:  No Microalbuminuria, No Diabetic Retinopathy, Date of last eye exam 07/18/2016, No Diabetic Neuropathy  on ACE i     ckd stage IV    followed by nephrology     I suggest that she doesn't consume more than 140 g protein per day   Plant better than animal but restricted on keto     --      Lab Results   Component Value Date    CREATININE 2.21 (H) 01/08/2020    BUN 33 (H) 01/08/2020     01/08/2020    K 4.1 01/08/2020     01/08/2020    CO2 27.0 01/08/2020         Lab Results   Component Value Date    CREATININE 2.21 (H) 01/08/2020    CREATININE 2.20 (H) 01/08/2020    CREATININE 2.37 (H) 12/18/2019         Immunizations:  Last pneumococcal immunization pneumovax before age 65     Flu Shot will have in Mid Nov 2016       Preventive Care:  No smoking      Weight Related:   Wt Readings from Last 3 Encounters:   01/15/20 (!) 181 kg (398 lb 6.4 oz)   01/08/20 (!) 179 kg (394 lb)   01/08/20 (!) 179 kg (394 lb 4.8 oz)     Body mass index is 66.3 kg/m².      prefers no bariatric surgery    Now on cpap       Bone Health    Lab Results   Component Value Date    PTH 38.3 09/04/2019    CALCIUM 9.0 01/08/2020     For JARETH     Was having hypercalcemia with rocatrol 0.25 three times weekly and on calcium    Now on sensipar 30 mg daily but without calcium -- not on sensipar anymore     Start calcium low dose 400 mg daily     DXA No 2018, osteopenia left femoral neck     Monitor PTH , no more than 150 , no less than 60       Thyroid Health  Lab Results   Component Value Date    TSH 3.590 01/08/2020     On levothyroxine 50 ug daily - increase to 75 mcgs  daily     Other Diabetes Related Aspects       Lab Results   Component Value Date    PMARZDUP48 811 01/08/2020     Lab Results   Component Value Date    WBC 10.81 (H) 01/08/2020    HGB 10.6 (L) 01/08/2020    HCT 32.2 (L) 01/08/2020    MCV 91.0 01/08/2020     01/08/2020     Lab Results   Component Value Date    IRON 77 08/07/2019    TIBC 276 08/07/2019    FERRITIN 90.10 08/07/2019       Anemia , managed by nephrology   Deciding vs epo vs iron   But now   DUB, may need hysterectomy       Systolic Murmur, AS? Echo   Could be due to anemia     Echo and carotid US from 7-17 , normal echo and less than 50% blockage in carotids, repeat     I reviewed and summarized records from Ole Soliman MD from present year  and I reviewed / ordered labs.     Orders Placed This Encounter   Procedures   • US Carotid Bilateral     [facility] Inst[facility]     Order Specific Question:   Reason for Exam:     Answer:   carotid artery disease         A copy of my note was sent to Ole Soliman MD    Please see my above opinion and suggestions.

## 2020-01-17 ENCOUNTER — TELEPHONE (OUTPATIENT)
Dept: ENDOCRINOLOGY | Facility: CLINIC | Age: 60
End: 2020-01-17

## 2020-01-20 ENCOUNTER — TELEPHONE (OUTPATIENT)
Dept: ENDOCRINOLOGY | Facility: CLINIC | Age: 60
End: 2020-01-20

## 2020-01-22 ENCOUNTER — HOSPITAL ENCOUNTER (OUTPATIENT)
Dept: ULTRASOUND IMAGING | Facility: HOSPITAL | Age: 60
Discharge: HOME | End: 2020-01-22
Admitting: INTERNAL MEDICINE

## 2020-01-22 PROCEDURE — 93880 EXTRACRANIAL BILAT STUDY: CPT

## 2020-01-22 PROCEDURE — 93880 EXTRACRANIAL BILAT STUDY: CPT | Performed by: SURGERY

## 2020-01-23 RX ORDER — PEN NEEDLE, DIABETIC 29 G X1/2"
NEEDLE, DISPOSABLE MISCELLANEOUS
Qty: 200 EACH | Refills: 0 | Status: SHIPPED | OUTPATIENT
Start: 2020-01-23 | End: 2020-10-27

## 2020-01-27 ENCOUNTER — TELEPHONE (OUTPATIENT)
Dept: ENDOCRINOLOGY | Facility: CLINIC | Age: 60
End: 2020-01-27

## 2020-02-05 ENCOUNTER — OFFICE VISIT (OUTPATIENT)
Dept: ONCOLOGY | Facility: CLINIC | Age: 60
End: 2020-02-05

## 2020-02-05 ENCOUNTER — LAB (OUTPATIENT)
Dept: LAB | Facility: HOSPITAL | Age: 60
End: 2020-02-05

## 2020-02-05 ENCOUNTER — INFUSION (OUTPATIENT)
Dept: ONCOLOGY | Facility: HOSPITAL | Age: 60
End: 2020-02-05

## 2020-02-05 VITALS
HEART RATE: 78 BPM | RESPIRATION RATE: 18 BRPM | HEIGHT: 65 IN | WEIGHT: 293 LBS | TEMPERATURE: 98.4 F | SYSTOLIC BLOOD PRESSURE: 144 MMHG | BODY MASS INDEX: 48.82 KG/M2 | OXYGEN SATURATION: 99 % | DIASTOLIC BLOOD PRESSURE: 47 MMHG

## 2020-02-05 VITALS
HEART RATE: 73 BPM | TEMPERATURE: 98.3 F | WEIGHT: 293 LBS | OXYGEN SATURATION: 98 % | BODY MASS INDEX: 48.82 KG/M2 | DIASTOLIC BLOOD PRESSURE: 70 MMHG | SYSTOLIC BLOOD PRESSURE: 150 MMHG | HEIGHT: 65 IN | RESPIRATION RATE: 16 BRPM

## 2020-02-05 DIAGNOSIS — N18.30 ANEMIA IN STAGE 3 CHRONIC KIDNEY DISEASE (HCC): Primary | ICD-10-CM

## 2020-02-05 DIAGNOSIS — D63.1 ANEMIA IN STAGE 3 CHRONIC KIDNEY DISEASE (HCC): Primary | ICD-10-CM

## 2020-02-05 DIAGNOSIS — F39 UNSPECIFIED MOOD (AFFECTIVE) DISORDER (HCC): ICD-10-CM

## 2020-02-05 LAB
ALBUMIN SERPL-MCNC: 3.4 G/DL (ref 3.5–5.2)
ALBUMIN/GLOB SERPL: 1 G/DL
ALP SERPL-CCNC: 76 U/L (ref 39–117)
ALT SERPL W P-5'-P-CCNC: 11 U/L (ref 1–33)
ANION GAP SERPL CALCULATED.3IONS-SCNC: 11 MMOL/L (ref 5–15)
AST SERPL-CCNC: 14 U/L (ref 1–32)
BASOPHILS # BLD AUTO: 0.03 10*3/MM3 (ref 0–0.2)
BASOPHILS NFR BLD AUTO: 0.3 % (ref 0–1.5)
BILIRUB SERPL-MCNC: 0.2 MG/DL (ref 0.2–1.2)
BUN BLD-MCNC: 40 MG/DL (ref 6–20)
BUN/CREAT SERPL: 16 (ref 7–25)
CALCIUM SPEC-SCNC: 8.5 MG/DL (ref 8.6–10.5)
CHLORIDE SERPL-SCNC: 107 MMOL/L (ref 98–107)
CO2 SERPL-SCNC: 24 MMOL/L (ref 22–29)
CREAT BLD-MCNC: 2.5 MG/DL (ref 0.57–1)
DEPRECATED RDW RBC AUTO: 53.8 FL (ref 37–54)
EOSINOPHIL # BLD AUTO: 0.18 10*3/MM3 (ref 0–0.4)
EOSINOPHIL NFR BLD AUTO: 1.7 % (ref 0.3–6.2)
ERYTHROCYTE [DISTWIDTH] IN BLOOD BY AUTOMATED COUNT: 16.3 % (ref 12.3–15.4)
GFR SERPL CREATININE-BSD FRML MDRD: 20 ML/MIN/1.73
GLOBULIN UR ELPH-MCNC: 3.5 GM/DL
GLUCOSE BLD-MCNC: 209 MG/DL (ref 65–99)
HCT VFR BLD AUTO: 32.1 % (ref 34–46.6)
HGB BLD-MCNC: 10.7 G/DL (ref 12–15.9)
HOLD SPECIMEN: NORMAL
HOLD SPECIMEN: NORMAL
IMM GRANULOCYTES # BLD AUTO: 0.08 10*3/MM3 (ref 0–0.05)
IMM GRANULOCYTES NFR BLD AUTO: 0.8 % (ref 0–0.5)
LYMPHOCYTES # BLD AUTO: 1.7 10*3/MM3 (ref 0.7–3.1)
LYMPHOCYTES NFR BLD AUTO: 16.3 % (ref 19.6–45.3)
MCH RBC QN AUTO: 30.1 PG (ref 26.6–33)
MCHC RBC AUTO-ENTMCNC: 33.3 G/DL (ref 31.5–35.7)
MCV RBC AUTO: 90.2 FL (ref 79–97)
MONOCYTES # BLD AUTO: 0.5 10*3/MM3 (ref 0.1–0.9)
MONOCYTES NFR BLD AUTO: 4.8 % (ref 5–12)
NEUTROPHILS # BLD AUTO: 7.95 10*3/MM3 (ref 1.7–7)
NEUTROPHILS NFR BLD AUTO: 76.1 % (ref 42.7–76)
NRBC BLD AUTO-RTO: 0 /100 WBC (ref 0–0.2)
PLATELET # BLD AUTO: 235 10*3/MM3 (ref 140–450)
PMV BLD AUTO: 10.6 FL (ref 6–12)
POTASSIUM BLD-SCNC: 3.7 MMOL/L (ref 3.5–5.2)
PROT SERPL-MCNC: 6.9 G/DL (ref 6–8.5)
RBC # BLD AUTO: 3.56 10*6/MM3 (ref 3.77–5.28)
SODIUM BLD-SCNC: 142 MMOL/L (ref 136–145)
WBC NRBC COR # BLD: 10.44 10*3/MM3 (ref 3.4–10.8)

## 2020-02-05 PROCEDURE — 85025 COMPLETE CBC W/AUTO DIFF WBC: CPT

## 2020-02-05 PROCEDURE — 80053 COMPREHEN METABOLIC PANEL: CPT

## 2020-02-05 PROCEDURE — 96372 THER/PROPH/DIAG INJ SC/IM: CPT

## 2020-02-05 PROCEDURE — 36415 COLL VENOUS BLD VENIPUNCTURE: CPT

## 2020-02-05 PROCEDURE — 25010000002 EPOETIN ALFA-EPBX 40000 UNIT/ML SOLUTION: Performed by: INTERNAL MEDICINE

## 2020-02-05 PROCEDURE — 99214 OFFICE O/P EST MOD 30 MIN: CPT | Performed by: INTERNAL MEDICINE

## 2020-02-05 RX ADMIN — EPOETIN ALFA-EPBX 40000 UNITS: 40000 INJECTION, SOLUTION INTRAVENOUS; SUBCUTANEOUS at 13:56

## 2020-02-05 NOTE — PROGRESS NOTES
[facility]  HEMATOLOGY & ONCOLOGY    Cancer Staging Information:  Cancer Staging  No matching staging information was found for the patient.      Subjective     VISIT DIAGNOSIS:   No diagnosis found.    REASON FOR VISIT:     Chief Complaint   Patient presents with   • Anemia     HERE FOR PROCRIT, NO COMPLAINTS         HEMATOLOGY / ONCOLOGY HISTORY:    No history exists.           INTERVAL HISTORY  Patient ID: Maria C Shrestha is a 59 y.o. year old female Cancer Staging    12/3/19: she has a lot of family stressors that is making her depressed.    2/5/2020: No new issues.   - Denies sob,cp,n/v/dizziness.occassional blood streaked stool from hemorrhoids.  --rest of ros unremarkable. Physical exam unremarkable.    Past Medical History:   Past Medical History:   Diagnosis Date   • Acquired hypothyroidism 2/6/2017   • Allergic    • Anemia    • Anxiety    • Arthritis    • Cellulitis    • Chronic kidney disease     3rd stage   • Depression    • Disease of thyroid gland    • Dyslipidemia    • Elevated cholesterol    • Essential hypertension    • Fatigue    • Gallbladder abscess    • Hearing loss    • Light headed    • Limited range of motion (ROM) of shoulder     right   • Obesity    • Palpitation    • Short of breath on exertion    • Sleep apnea    • Type 2 diabetes mellitus (CMS/HCC)    • Type II diabetes mellitus, uncontrolled (CMS/HCC)    • Wears glasses      Past Surgical History:   Past Surgical History:   Procedure Laterality Date   • ADENOIDECTOMY     • ANAL FISTULA REPAIR      x 2    • CHOLECYSTECTOMY     • COLONOSCOPY  01/02/2014   • COLONOSCOPY N/A 3/22/2017    Procedure: COLONOSCOPY WITH ANESTHESIA;  Surgeon: Mono Linder MD;  Location: [facility] ENDOSCOPY;  Service:    • D&C HYSTEROSCOPY N/A 7/25/2018    Procedure: DILATATION AND CURETTAGE HYSTEROSCOPY;  Surgeon: Michael Castaneda MD;  Location: [facility] OR;  Service: Obstetrics/Gynecology   • DILATATION AND CURETTAGE      X 2   • ENDOSCOPY    "  • TONSILLECTOMY       Social History:   Social History     Socioeconomic History   • Marital status:      Spouse name: Not on file   • Number of children: Not on file   • Years of education: Not on file   • Highest education level: Not on file   Tobacco Use   • Smoking status: Never Smoker   • Smokeless tobacco: Never Used   Substance and Sexual Activity   • Alcohol use: No   • Drug use: No   • Sexual activity: Never     Birth control/protection: Post-menopausal     Family History:   Family History   Problem Relation Age of Onset   • Diabetes Other    • Cancer Mother    • Cancer Father    • Heart disease Father    • Diabetes Father    • Obesity Father    • Stroke Father    • Cancer Maternal Grandmother    • Uterine cancer Maternal Grandmother    • Diabetes Maternal Grandfather    • Cancer Paternal Grandmother    • Colon cancer Paternal Grandmother    • Diabetes Paternal Grandfather    • Heart disease Paternal Grandfather    • No Known Problems Sister    • No Known Problems Brother    • No Known Problems Daughter    • No Known Problems Son    • No Known Problems Maternal Aunt    • No Known Problems Paternal Aunt    • BRCA 1/2 Neg Hx    • Breast cancer Neg Hx    • Endometrial cancer Neg Hx    • Ovarian cancer Neg Hx        Review of Systems   See HPI otherwise unremarkable.  Performance Status:  Asymptomatic    Medications:    Current Outpatient Medications   Medication Sig Dispense Refill   • allopurinol (ZYLOPRIM) 100 MG tablet Take 100 mg by mouth 2 (Two) Times a Day.     • amLODIPine (NORVASC) 5 MG tablet      • aspirin 81 MG tablet Take 81 mg by mouth Daily. Stop 7/22/2018     • BD INSULIN SYRINGE U/F 31G X 5/16\" 1 ML misc AS DIRECTED FOUR TIMES A  each 0   • busPIRone (BUSPAR) 10 MG tablet buspirone 10 mg tablet   Take 2 tablets twice a day by oral route as needed for 90 days.     • carvedilol (COREG) 3.125 MG tablet Take 6.25 mg by mouth 2 (Two) Times a Day.     • cetirizine (zyrTEC) 10 MG " tablet Take 10 mg by mouth As Needed.     • Cholecalciferol (VITAMIN D3) 76485 units tablet Vitamin D2 50,000 unit capsule   Take 1 capsule every week by oral route. Sunday     • citalopram (CeleXA) 20 MG tablet Take 20 mg by mouth Daily.     • diazePAM (VALIUM) 2 MG tablet Take 2 mg by mouth As Needed for Anxiety.     • Dulaglutide (TRULICITY) 1.5 MG/0.5ML solution pen-injector Inject 1.5 mg under the skin into the appropriate area as directed Every 7 (Seven) Days. 6 mL 3   • Ergocalciferol (VITAMIN D2 PO) Take 1,250 mcg by mouth 1 (One) Time Per Week. VITAMIN D2 1250 MCG (50,000 UNIT) CAPSULE TAKE 1 CAPSULE EVERY WEEK BY ORAL ROUTE     • Evolocumab 140 MG/ML solution auto-injector Inject 1 mL under the skin into the appropriate area as directed Every 14 (Fourteen) Days for 24 doses. NDC [address]-30 6 mL 10   • fluticasone (VERAMYST) 27.5 MCG/SPRAY nasal spray 2 sprays into each nostril Daily.     • Glucose Blood (BLOOD GLUCOSE TEST) strip Use 4 x daily, use any brand covered by insurance or same brand as before 360 each 3   • ibuprofen (ADVIL,MOTRIN) 800 MG tablet Take 800 mg by mouth Every 8 (Eight) Hours As Needed for Mild Pain .     • insulin glargine (LANTUS) 100 UNIT/ML injection INJECT 200 UNITS DAILY 180 mL 3   • Insulin Lispro (HUMALOG KWIKPEN) 200 UNIT/ML solution pen-injector Inject 80 Units under the skin into the appropriate area as directed 3 (Three) Times a Day With Meals. 36 pen 11   • Insulin Pen Needle (PEN NEEDLES) 31G X 6 MM misc Use to inject insulin 4 times daily. 200 each 11   • lamoTRIgine (LaMICtal) 50 MG tablet dispersible disintegrating tablet Take 50 mg by mouth Every Night.     • Lancets (FREESTYLE) lancets FOUR TIMES A  each 11   • levothyroxine (SYNTHROID) 75 MCG tablet Take 1 tablet by mouth Daily. 30 tablet 11   • lisinopril (PRINIVIL,ZESTRIL) 20 MG tablet Take 40 mg by mouth 2 (Two) Times a Day.     • Melatonin 10 MG tablet Take  by mouth Every Night.     • Multiple  "Vitamins-Minerals (MULTIVITAMIN ADULT PO) Take 1 tablet by mouth Daily.     • Omega-3 Fatty Acids (FISH OIL) 1000 MG capsule capsule Take 2,000 mg by mouth Daily With Dinner.     • potassium gluconate 595 (99 K) MG tablet tablet potassium gluconate 595 mg (99 mg) tablet   Take 1 tablets every day by oral route.     • rosuvastatin (CRESTOR) 10 MG tablet Take 10 mg by mouth Daily.     • sodium bicarbonate 650 MG tablet Take 650 mg by mouth 4 (Four) Times a Day.     • traMADol (ULTRAM) 50 MG tablet Take 1 tablet by mouth Every 6 (Six) Hours As Needed for Moderate Pain . 8 tablet 0   • cinacalcet (SENSIPAR) 30 MG tablet Take 1 tablet by mouth Daily. 30 tablet 3     No current facility-administered medications for this visit.        ALLERGIES:    Allergies   Allergen Reactions   • Codeine Nausea Only       Objective      Vitals:    02/05/20 1306   BP: 150/70   Pulse: 73   Resp: 16   Temp: 98.3 °F (36.8 °C)   SpO2: 98%   Weight: (!) 179 kg (394 lb 4.8 oz)   Height: 165.1 cm (65\")   PainSc:   4   PainLoc: Knee         Current Status 2/5/2020   ECOG score 1         Physical Examremains the same  General Appearance:obese female.  Patient is awake, alert, oriented and in no acute distress. Patient is welldeveloped, wellnourished, and appears stated age.  HEENT: Normocephalic. Sclerae clear, conjunctiva pink, extraocular movements intact, pupils, round, reactive to light and  accommodation. Mouth and throat are clear with moist oral mucosa.  NECK: Supple, no jugular venous distention, thyroid not enlarged.  LYMPH: No cervical, supraclavicular, axillary, or inguinal lymphadenopathy.  CHEST: Equal bilateral expansion, AP  diameter normal, resonant percussion note  LUNGS: Good air movement, no rales, rhonchi, rubs or wheezes with auscultation  CARDIO: Regular sinus rhythm, no murmurs, gallops or rubs.  ABDOMEN: obese abdomen. Nondistended, soft, No tenderness, no guarding, no rebound, No hepatosplenomegaly. No abdominal masses. " Bowel sounds positive. No hernia  GENITALIA: Not examined.  BREASTS: Not examined.  MUSKEL: No joint swelling, decreased motion, or inflammation  EXTREMS: kristie YAZAN. No clubbing, cyanosis, No varicose veins.  NEURO: Grossly nonfocal, Gait is coordinated and smooth, Cognition is preserved.  SKIN: No rashes, no ecchymoses, no petechia.  PSYCH: Oriented to time, place and person. Memory is preserved. Mood and affect appear normal  RECENT LABS:  Lab on 02/05/2020   Component Date Value Ref Range Status   • Glucose 02/05/2020 209* 65 - 99 mg/dL Final   • BUN 02/05/2020 40* 6 - 20 mg/dL Final   • Creatinine 02/05/2020 2.50* 0.57 - 1.00 mg/dL Final   • Sodium 02/05/2020 142  136 - 145 mmol/L Final   • Potassium 02/05/2020 3.7  3.5 - 5.2 mmol/L Final   • Chloride 02/05/2020 107  98 - 107 mmol/L Final   • CO2 02/05/2020 24.0  22.0 - 29.0 mmol/L Final   • Calcium 02/05/2020 8.5* 8.6 - 10.5 mg/dL Final   • Total Protein 02/05/2020 6.9  6.0 - 8.5 g/dL Final   • Albumin 02/05/2020 3.40* 3.50 - 5.20 g/dL Final   • ALT (SGPT) 02/05/2020 11  1 - 33 U/L Final   • AST (SGOT) 02/05/2020 14  1 - 32 U/L Final   • Alkaline Phosphatase 02/05/2020 76  39 - 117 U/L Final   • Total Bilirubin 02/05/2020 0.2  0.2 - 1.2 mg/dL Final   • eGFR Non African Amer 02/05/2020 20* >60 mL/min/1.73 Final   • Globulin 02/05/2020 3.5  gm/dL Final   • A/G Ratio 02/05/2020 1.0  g/dL Final   • BUN/Creatinine Ratio 02/05/2020 16.0  7.0 - 25.0 Final   • Anion Gap 02/05/2020 11.0  5.0 - 15.0 mmol/L Final   • WBC 02/05/2020 10.44  3.40 - 10.80 10*3/mm3 Final   • RBC 02/05/2020 3.56* 3.77 - 5.28 10*6/mm3 Final   • Hemoglobin 02/05/2020 10.7* 12.0 - 15.9 g/dL Final   • Hematocrit 02/05/2020 32.1* 34.0 - 46.6 % Final   • MCV 02/05/2020 90.2  79.0 - 97.0 fL Final   • MCH 02/05/2020 30.1  26.6 - 33.0 pg Final   • MCHC 02/05/2020 33.3  31.5 - 35.7 g/dL Final   • RDW 02/05/2020 16.3* 12.3 - 15.4 % Final   • RDW-SD 02/05/2020 53.8  37.0 - 54.0 fl Final   • MPV 02/05/2020  10.6  6.0 - 12.0 fL Final   • Platelets 02/05/2020 235  140 - 450 10*3/mm3 Final   • Neutrophil % 02/05/2020 76.1* 42.7 - 76.0 % Final   • Lymphocyte % 02/05/2020 16.3* 19.6 - 45.3 % Final   • Monocyte % 02/05/2020 4.8* 5.0 - 12.0 % Final   • Eosinophil % 02/05/2020 1.7  0.3 - 6.2 % Final   • Basophil % 02/05/2020 0.3  0.0 - 1.5 % Final   • Immature Grans % 02/05/2020 0.8* 0.0 - 0.5 % Final   • Neutrophils, Absolute 02/05/2020 7.95* 1.70 - 7.00 10*3/mm3 Final   • Lymphocytes, Absolute 02/05/2020 1.70  0.70 - 3.10 10*3/mm3 Final   • Monocytes, Absolute 02/05/2020 0.50  0.10 - 0.90 10*3/mm3 Final   • Eosinophils, Absolute 02/05/2020 0.18  0.00 - 0.40 10*3/mm3 Final   • Basophils, Absolute 02/05/2020 0.03  0.00 - 0.20 10*3/mm3 Final   • Immature Grans, Absolute 02/05/2020 0.08* 0.00 - 0.05 10*3/mm3 Final   • nRBC 02/05/2020 0.0  0.0 - 0.2 /100 WBC Final       RADIOLOGY:  Us Carotid Bilateral    Result Date: 1/22/2020  Narrative: History: Carotid occlusive disease      Impression: Impression: 1. There is less than 50% stenosis of the right internal carotid artery. 2. There is less than 50% stenosis of the left internal carotid artery. 3. Antegrade flow is demonstrated in bilateral vertebral arteries.  Comments: Bilateral carotid vertebral arterial duplex scan was performed.  Grayscale imaging shows intimal thickening and calcified elements at the carotid bifurcation. The right internal carotid artery peak systolic velocity is 92.0 cm/sec. The end-diastolic velocity is 21.7 cm/sec. The right ICA/CCA ratio is approximately 0.93 . These findings correlate with less than 50% stenosis of the right internal carotid artery.  Grayscale imaging shows intimal thickening and calcified elements at the carotid bifurcation. The left internal carotid artery peak systolic velocity is 88.5 cm/sec. The end-diastolic velocity is 21.1 cm/sec. The left ICA/CCA ratio is approximately 0.89 . These findings correlate with less than 50%  stenosis of the left internal carotid artery.  Antegrade flow is demonstrated in bilateral vertebral arteries.  This report was finalized on 01/22/2020 15:53 by Dr. Juno Jason MD.           Assessment/Plan  Maria C Shrestha is a 59 y.o. year old female  Whom we are seeing for AoCKD on procrit that is stable.    Patient Active Problem List   Diagnosis   • Type 2 diabetes mellitus with stage 3 chronic kidney disease, with long-term current use of insulin (CMS/HCC)   • Mixed diabetic hyperlipidemia associated with type 2 diabetes mellitus (CMS/HCC)   • Hypertension associated with diabetes (CMS/HCC)   • Vitamin D deficiency   • B12 deficiency   • Anemia in CKD (chronic kidney disease)   • Acquired hypothyroidism   • Chronic kidney disease, stage 4 (severe) (CMS/HCC)   • Anemia   • Anxiety   • Deep venous thrombosis of lower extremity (CMS/HCC)   • Disease of liver   • Embolism (CMS/HCC)   • Hypertriglyceridemia   • Hypocalcemia   • Mood disorder (CMS/HCC)   • Morbid obesity (CMS/HCC)   • Sleep apnea   • Acute renal failure superimposed on stage 3 chronic kidney disease (CMS/HCC)   • Anemia of chronic renal failure   • Hypertension   • Diabetes mellitus (CMS/HCC)   • Thyroid disease   • Hypothyroidism   • Type 2 DM with CKD stage 3 and hypertension (CMS/HCC)   • Vitamin D deficiency          1. AoCKD: s/p iron infusions. Labs reviewed with the pt cr 2.50. Advised increased fluid intake daily, wbc 10.44, Hg 10.7, plt 235  - Ok for procrit shot today. Goal Hg is >10 and <12.        2. HTN: on amlodipine, carvedilol, lisinopril    3.DM: on trulicity    4.Hypothyroidism: on synthroid  5. Anxiety/depression: on diazepam, celexa  6. Hyperlipidemia: on crestor.  7. CKD stage 4: sees renal. grf 20   pt cr 2.50. Advised increased fluid intake daily,           Rodrigo Huitron MD    2/5/2020    1:23 PM

## 2020-02-25 DIAGNOSIS — N18.30 ANEMIA IN STAGE 3 CHRONIC KIDNEY DISEASE (HCC): ICD-10-CM

## 2020-02-25 DIAGNOSIS — D63.1 ANEMIA IN STAGE 3 CHRONIC KIDNEY DISEASE (HCC): ICD-10-CM

## 2020-03-04 ENCOUNTER — APPOINTMENT (OUTPATIENT)
Dept: LAB | Facility: HOSPITAL | Age: 60
End: 2020-03-04

## 2020-03-04 ENCOUNTER — APPOINTMENT (OUTPATIENT)
Dept: ONCOLOGY | Facility: HOSPITAL | Age: 60
End: 2020-03-04

## 2020-03-11 ENCOUNTER — APPOINTMENT (OUTPATIENT)
Dept: ONCOLOGY | Facility: HOSPITAL | Age: 60
End: 2020-03-11

## 2020-03-11 ENCOUNTER — TELEPHONE (OUTPATIENT)
Dept: FAMILY MEDICINE CLINIC | Facility: CLINIC | Age: 60
End: 2020-03-11

## 2020-03-11 ENCOUNTER — APPOINTMENT (OUTPATIENT)
Dept: LAB | Facility: HOSPITAL | Age: 60
End: 2020-03-11

## 2020-03-11 NOTE — TELEPHONE ENCOUNTER
PHUONG WITH [facility] LEFT VM THAT SHE NEEDS OFFICE NOTE, LABS AND ICD 10 CODE FOR A PA. -[phone]  PHONE [phone]

## 2020-03-17 ENCOUNTER — TELEPHONE (OUTPATIENT)
Dept: ONCOLOGY | Facility: CLINIC | Age: 60
End: 2020-03-17

## 2020-03-17 NOTE — TELEPHONE ENCOUNTER
COVID-19  JOSE G  Received call from patient Maria C Shrestha, she reports she is deon for tomorrow 3/18/20 for lab work and possible Procrit injection. 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   Questioned if she has any chest congestion, chest pain, or elevated temp or fever. She reports she does not feel that she has had a fever, but never has checked it, and answers no to the other questions.     Apt was jose g to next week.

## 2020-03-18 ENCOUNTER — APPOINTMENT (OUTPATIENT)
Dept: LAB | Facility: HOSPITAL | Age: 60
End: 2020-03-18

## 2020-03-18 ENCOUNTER — APPOINTMENT (OUTPATIENT)
Dept: ONCOLOGY | Facility: HOSPITAL | Age: 60
End: 2020-03-18

## 2020-03-25 ENCOUNTER — LAB (OUTPATIENT)
Dept: LAB | Facility: HOSPITAL | Age: 60
End: 2020-03-25

## 2020-03-25 ENCOUNTER — INFUSION (OUTPATIENT)
Dept: ONCOLOGY | Facility: HOSPITAL | Age: 60
End: 2020-03-25

## 2020-03-25 ENCOUNTER — OFFICE VISIT (OUTPATIENT)
Dept: ONCOLOGY | Facility: CLINIC | Age: 60
End: 2020-03-25

## 2020-03-25 VITALS
WEIGHT: 293 LBS | TEMPERATURE: 98 F | DIASTOLIC BLOOD PRESSURE: 67 MMHG | SYSTOLIC BLOOD PRESSURE: 175 MMHG | HEART RATE: 68 BPM | RESPIRATION RATE: 18 BRPM | OXYGEN SATURATION: 98 % | HEIGHT: 65 IN | BODY MASS INDEX: 48.82 KG/M2

## 2020-03-25 VITALS
DIASTOLIC BLOOD PRESSURE: 75 MMHG | HEART RATE: 73 BPM | RESPIRATION RATE: 16 BRPM | SYSTOLIC BLOOD PRESSURE: 150 MMHG | OXYGEN SATURATION: 98 % | HEIGHT: 65 IN | TEMPERATURE: 98 F | BODY MASS INDEX: 48.82 KG/M2 | WEIGHT: 293 LBS

## 2020-03-25 DIAGNOSIS — D63.1 ANEMIA IN STAGE 3 CHRONIC KIDNEY DISEASE (HCC): ICD-10-CM

## 2020-03-25 DIAGNOSIS — D63.1 ANEMIA IN STAGE 3 CHRONIC KIDNEY DISEASE (HCC): Primary | ICD-10-CM

## 2020-03-25 DIAGNOSIS — N18.30 ANEMIA IN STAGE 3 CHRONIC KIDNEY DISEASE (HCC): Primary | ICD-10-CM

## 2020-03-25 DIAGNOSIS — N18.30 ANEMIA IN STAGE 3 CHRONIC KIDNEY DISEASE (HCC): ICD-10-CM

## 2020-03-25 LAB
ALBUMIN SERPL-MCNC: 3.4 G/DL (ref 3.5–5.2)
ALBUMIN/GLOB SERPL: 1 G/DL
ALP SERPL-CCNC: 81 U/L (ref 39–117)
ALT SERPL W P-5'-P-CCNC: 13 U/L (ref 1–33)
ANION GAP SERPL CALCULATED.3IONS-SCNC: 13 MMOL/L (ref 5–15)
AST SERPL-CCNC: 16 U/L (ref 1–32)
BASOPHILS # BLD AUTO: 0.04 10*3/MM3 (ref 0–0.2)
BASOPHILS NFR BLD AUTO: 0.4 % (ref 0–1.5)
BILIRUB SERPL-MCNC: <0.2 MG/DL (ref 0.2–1.2)
BUN BLD-MCNC: 39 MG/DL (ref 6–20)
BUN/CREAT SERPL: 15.8 (ref 7–25)
CALCIUM SPEC-SCNC: 9.1 MG/DL (ref 8.6–10.5)
CHLORIDE SERPL-SCNC: 105 MMOL/L (ref 98–107)
CO2 SERPL-SCNC: 23 MMOL/L (ref 22–29)
CREAT BLD-MCNC: 2.47 MG/DL (ref 0.57–1)
DEPRECATED RDW RBC AUTO: 53.7 FL (ref 37–54)
EOSINOPHIL # BLD AUTO: 0.2 10*3/MM3 (ref 0–0.4)
EOSINOPHIL NFR BLD AUTO: 2.2 % (ref 0.3–6.2)
ERYTHROCYTE [DISTWIDTH] IN BLOOD BY AUTOMATED COUNT: 16 % (ref 12.3–15.4)
GFR SERPL CREATININE-BSD FRML MDRD: 20 ML/MIN/1.73
GLOBULIN UR ELPH-MCNC: 3.3 GM/DL
GLUCOSE BLD-MCNC: 281 MG/DL (ref 65–99)
HCT VFR BLD AUTO: 31.3 % (ref 34–46.6)
HGB BLD-MCNC: 10.2 G/DL (ref 12–15.9)
HOLD SPECIMEN: NORMAL
HOLD SPECIMEN: NORMAL
IMM GRANULOCYTES # BLD AUTO: 0.07 10*3/MM3 (ref 0–0.05)
IMM GRANULOCYTES NFR BLD AUTO: 0.8 % (ref 0–0.5)
LYMPHOCYTES # BLD AUTO: 1.36 10*3/MM3 (ref 0.7–3.1)
LYMPHOCYTES NFR BLD AUTO: 14.7 % (ref 19.6–45.3)
MCH RBC QN AUTO: 29.9 PG (ref 26.6–33)
MCHC RBC AUTO-ENTMCNC: 32.6 G/DL (ref 31.5–35.7)
MCV RBC AUTO: 91.8 FL (ref 79–97)
MONOCYTES # BLD AUTO: 0.32 10*3/MM3 (ref 0.1–0.9)
MONOCYTES NFR BLD AUTO: 3.5 % (ref 5–12)
NEUTROPHILS # BLD AUTO: 7.27 10*3/MM3 (ref 1.7–7)
NEUTROPHILS NFR BLD AUTO: 78.4 % (ref 42.7–76)
NRBC BLD AUTO-RTO: 0 /100 WBC (ref 0–0.2)
PLATELET # BLD AUTO: 190 10*3/MM3 (ref 140–450)
PMV BLD AUTO: 10.4 FL (ref 6–12)
POTASSIUM BLD-SCNC: 3.9 MMOL/L (ref 3.5–5.2)
PROT SERPL-MCNC: 6.7 G/DL (ref 6–8.5)
RBC # BLD AUTO: 3.41 10*6/MM3 (ref 3.77–5.28)
SODIUM BLD-SCNC: 141 MMOL/L (ref 136–145)
WBC NRBC COR # BLD: 9.26 10*3/MM3 (ref 3.4–10.8)

## 2020-03-25 PROCEDURE — 80053 COMPREHEN METABOLIC PANEL: CPT

## 2020-03-25 PROCEDURE — 36415 COLL VENOUS BLD VENIPUNCTURE: CPT

## 2020-03-25 PROCEDURE — 99214 OFFICE O/P EST MOD 30 MIN: CPT | Performed by: INTERNAL MEDICINE

## 2020-03-25 PROCEDURE — 96372 THER/PROPH/DIAG INJ SC/IM: CPT

## 2020-03-25 PROCEDURE — 25010000002 EPOETIN ALFA-EPBX 40000 UNIT/ML SOLUTION: Performed by: INTERNAL MEDICINE

## 2020-03-25 PROCEDURE — 85025 COMPLETE CBC W/AUTO DIFF WBC: CPT

## 2020-03-25 RX ORDER — AMLODIPINE BESYLATE 10 MG/1
10 TABLET ORAL
COMMUNITY
Start: 2020-03-18 | End: 2022-02-09

## 2020-03-25 RX ORDER — LISINOPRIL 40 MG/1
40 TABLET ORAL 2 TIMES DAILY
COMMUNITY
Start: 2020-03-18 | End: 2022-02-09

## 2020-03-25 RX ADMIN — EPOETIN ALFA-EPBX 40000 UNITS: 40000 INJECTION, SOLUTION INTRAVENOUS; SUBCUTANEOUS at 11:57

## 2020-03-25 NOTE — PROGRESS NOTES
[facility]  HEMATOLOGY & ONCOLOGY    Cancer Staging Information:  Cancer Staging  No matching staging information was found for the patient.      Subjective     VISIT DIAGNOSIS:   No diagnosis found.    REASON FOR VISIT:     Chief Complaint   Patient presents with   • Anemia     HERE FOR PROCRIT, NO COMPLAINTS.         HEMATOLOGY / ONCOLOGY HISTORY:    No history exists.           INTERVAL HISTORY  Patient ID: Maria C Shrestha is a 59 y.o. year old female Cancer Staging    12/3/19: she has a lot of family stressors that is making her depressed.    3/25/2020: No new issues.   - Denies sob,cp,n/v/dizziness.occassional blood streaked stool from hemorrhoids.  --rest of ros unremarkable. Physical exam unremarkable.    Past Medical History:   Past Medical History:   Diagnosis Date   • Acquired hypothyroidism 2/6/2017   • Allergic    • Anemia    • Anxiety    • Arthritis    • Cellulitis    • Chronic kidney disease     3rd stage   • Depression    • Disease of thyroid gland    • Dyslipidemia    • Elevated cholesterol    • Essential hypertension    • Fatigue    • Gallbladder abscess    • Hearing loss    • Light headed    • Limited range of motion (ROM) of shoulder     right   • Obesity    • Palpitation    • Short of breath on exertion    • Sleep apnea    • Type 2 diabetes mellitus (CMS/HCC)    • Type II diabetes mellitus, uncontrolled (CMS/HCC)    • Wears glasses      Past Surgical History:   Past Surgical History:   Procedure Laterality Date   • ADENOIDECTOMY     • ANAL FISTULA REPAIR      x 2    • CHOLECYSTECTOMY     • COLONOSCOPY  01/02/2014   • COLONOSCOPY N/A 3/22/2017    Procedure: COLONOSCOPY WITH ANESTHESIA;  Surgeon: Mono Linder MD;  Location: [facility] ENDOSCOPY;  Service:    • D&C HYSTEROSCOPY N/A 7/25/2018    Procedure: DILATATION AND CURETTAGE HYSTEROSCOPY;  Surgeon: Michael Castaneda MD;  Location: [facility] OR;  Service: Obstetrics/Gynecology   • DILATATION AND CURETTAGE      X 2   • ENDOSCOPY    "  • TONSILLECTOMY       Social History:   Social History     Socioeconomic History   • Marital status:      Spouse name: Not on file   • Number of children: Not on file   • Years of education: Not on file   • Highest education level: Not on file   Tobacco Use   • Smoking status: Never Smoker   • Smokeless tobacco: Never Used   Substance and Sexual Activity   • Alcohol use: No   • Drug use: No   • Sexual activity: Never     Birth control/protection: Post-menopausal     Family History:   Family History   Problem Relation Age of Onset   • Diabetes Other    • Cancer Mother    • Cancer Father    • Heart disease Father    • Diabetes Father    • Obesity Father    • Stroke Father    • Cancer Maternal Grandmother    • Uterine cancer Maternal Grandmother    • Diabetes Maternal Grandfather    • Cancer Paternal Grandmother    • Colon cancer Paternal Grandmother    • Diabetes Paternal Grandfather    • Heart disease Paternal Grandfather    • No Known Problems Sister    • No Known Problems Brother    • No Known Problems Daughter    • No Known Problems Son    • No Known Problems Maternal Aunt    • No Known Problems Paternal Aunt    • BRCA 1/2 Neg Hx    • Breast cancer Neg Hx    • Endometrial cancer Neg Hx    • Ovarian cancer Neg Hx        Review of Systems   See HPI otherwise unremarkable.  Performance Status:  Asymptomatic    Medications:    Current Outpatient Medications   Medication Sig Dispense Refill   • allopurinol (ZYLOPRIM) 100 MG tablet Take 100 mg by mouth 2 (Two) Times a Day.     • amLODIPine (NORVASC) 10 MG tablet Take 10 mg by mouth.     • amLODIPine (NORVASC) 5 MG tablet      • aspirin 81 MG tablet Take 81 mg by mouth Daily. Stop 7/22/2018     • BD INSULIN SYRINGE U/F 31G X 5/16\" 1 ML misc AS DIRECTED FOUR TIMES A  each 0   • busPIRone (BUSPAR) 10 MG tablet buspirone 10 mg tablet   Take 2 tablets twice a day by oral route as needed for 90 days.     • carvedilol (COREG) 3.125 MG tablet Take 6.25 mg by " mouth 2 (Two) Times a Day.     • cetirizine (zyrTEC) 10 MG tablet Take 10 mg by mouth As Needed.     • Cholecalciferol (VITAMIN D3) 74004 units tablet Vitamin D2 50,000 unit capsule   Take 1 capsule every week by oral route. Sunday     • cinacalcet (SENSIPAR) 30 MG tablet Take 1 tablet by mouth Daily. 30 tablet 3   • citalopram (CeleXA) 20 MG tablet Take 20 mg by mouth Daily.     • diazePAM (VALIUM) 2 MG tablet Take 2 mg by mouth As Needed for Anxiety.     • Dulaglutide (TRULICITY) 1.5 MG/0.5ML solution pen-injector Inject 1.5 mg under the skin into the appropriate area as directed Every 7 (Seven) Days. 6 mL 3   • Ergocalciferol (VITAMIN D2 PO) Take 1,250 mcg by mouth 1 (One) Time Per Week. VITAMIN D2 1250 MCG (50,000 UNIT) CAPSULE TAKE 1 CAPSULE EVERY WEEK BY ORAL ROUTE     • Evolocumab 140 MG/ML solution auto-injector Inject 1 mL under the skin into the appropriate area as directed Every 14 (Fourteen) Days for 24 doses. NDC [address]-65 6 mL 10   • fluticasone (VERAMYST) 27.5 MCG/SPRAY nasal spray 2 sprays into each nostril Daily.     • Glucose Blood (BLOOD GLUCOSE TEST) strip Use 4 x daily, use any brand covered by insurance or same brand as before 360 each 3   • ibuprofen (ADVIL,MOTRIN) 800 MG tablet Take 800 mg by mouth Every 8 (Eight) Hours As Needed for Mild Pain .     • insulin glargine (LANTUS) 100 UNIT/ML injection INJECT 200 UNITS DAILY 180 mL 3   • Insulin Lispro (HUMALOG KWIKPEN) 200 UNIT/ML solution pen-injector Inject 80 Units under the skin into the appropriate area as directed 3 (Three) Times a Day With Meals. 36 pen 11   • Insulin Pen Needle (PEN NEEDLES) 31G X 6 MM misc Use to inject insulin 4 times daily. 200 each 11   • lamoTRIgine (LaMICtal) 50 MG tablet dispersible disintegrating tablet Take 50 mg by mouth Every Night.     • Lancets (FREESTYLE) lancets FOUR TIMES A  each 11   • levothyroxine (SYNTHROID) 75 MCG tablet Take 1 tablet by mouth Daily. 30 tablet 11   • lisinopril  "(PRINIVIL,ZESTRIL) 20 MG tablet Take 40 mg by mouth 2 (Two) Times a Day.     • lisinopril (PRINIVIL,ZESTRIL) 40 MG tablet Take 40 mg by mouth.     • Melatonin 10 MG tablet Take  by mouth Every Night.     • Multiple Vitamins-Minerals (MULTIVITAMIN ADULT PO) Take 1 tablet by mouth Daily.     • Omega-3 Fatty Acids (FISH OIL) 1000 MG capsule capsule Take 2,000 mg by mouth Daily With Dinner.     • potassium gluconate 595 (99 K) MG tablet tablet potassium gluconate 595 mg (99 mg) tablet   Take 1 tablets every day by oral route.     • rosuvastatin (CRESTOR) 10 MG tablet Take 10 mg by mouth Daily.     • sodium bicarbonate 650 MG tablet Take 650 mg by mouth 4 (Four) Times a Day.     • traMADol (ULTRAM) 50 MG tablet Take 1 tablet by mouth Every 6 (Six) Hours As Needed for Moderate Pain . 8 tablet 0     No current facility-administered medications for this visit.        ALLERGIES:    Allergies   Allergen Reactions   • Codeine Nausea Only       Objective      Vitals:    03/25/20 1115   BP: 150/75   Pulse: 73   Resp: 16   Temp: 98 °F (36.7 °C)   SpO2: 98%   Weight: (!) 179 kg (395 lb)   Height: 165.1 cm (65\")   PainSc: 0-No pain         Current Status 3/25/2020   ECOG score 1         Physical Examremains the same  General Appearance:obese female.  Patient is awake, alert, oriented and in no acute distress. Patient is welldeveloped, wellnourished, and appears stated age.  HEENT: Normocephalic. Sclerae clear, conjunctiva pink, extraocular movements intact, pupils, round, reactive to light and  accommodation. Mouth and throat are clear with moist oral mucosa.  NECK: Supple, no jugular venous distention, thyroid not enlarged.  LYMPH: No cervical, supraclavicular, axillary, or inguinal lymphadenopathy.  CHEST: Equal bilateral expansion, AP  diameter normal, resonant percussion note  LUNGS: Good air movement, no rales, rhonchi, rubs or wheezes with auscultation  CARDIO: Regular sinus rhythm, no murmurs, gallops or rubs.  ABDOMEN: " obese abdomen. Nondistended, soft, No tenderness, no guarding, no rebound, No hepatosplenomegaly. No abdominal masses. Bowel sounds positive. No hernia  GENITALIA: Not examined.  BREASTS: Not examined.  MUSKEL: No joint swelling, decreased motion, or inflammation  EXTREMS: kristie YAZAN. No clubbing, cyanosis, No varicose veins.  NEURO: Grossly nonfocal, Gait is coordinated and smooth, Cognition is preserved.  SKIN: No rashes, no ecchymoses, no petechia.  PSYCH: Oriented to time, place and person. Memory is preserved. Mood and affect appear normal  RECENT LABS:  Lab on 03/25/2020   Component Date Value Ref Range Status   • Glucose 03/25/2020 281* 65 - 99 mg/dL Final   • BUN 03/25/2020 39* 6 - 20 mg/dL Final   • Creatinine 03/25/2020 2.47* 0.57 - 1.00 mg/dL Final   • Sodium 03/25/2020 141  136 - 145 mmol/L Final   • Potassium 03/25/2020 3.9  3.5 - 5.2 mmol/L Final   • Chloride 03/25/2020 105  98 - 107 mmol/L Final   • CO2 03/25/2020 23.0  22.0 - 29.0 mmol/L Final   • Calcium 03/25/2020 9.1  8.6 - 10.5 mg/dL Final   • Total Protein 03/25/2020 6.7  6.0 - 8.5 g/dL Final   • Albumin 03/25/2020 3.40* 3.50 - 5.20 g/dL Final   • ALT (SGPT) 03/25/2020 13  1 - 33 U/L Final   • AST (SGOT) 03/25/2020 16  1 - 32 U/L Final   • Alkaline Phosphatase 03/25/2020 81  39 - 117 U/L Final   • Total Bilirubin 03/25/2020 <0.2* 0.2 - 1.2 mg/dL Final   • eGFR Non African Amer 03/25/2020 20* >60 mL/min/1.73 Final   • Globulin 03/25/2020 3.3  gm/dL Final   • A/G Ratio 03/25/2020 1.0  g/dL Final   • BUN/Creatinine Ratio 03/25/2020 15.8  7.0 - 25.0 Final   • Anion Gap 03/25/2020 13.0  5.0 - 15.0 mmol/L Final   • WBC 03/25/2020 9.26  3.40 - 10.80 10*3/mm3 Final   • RBC 03/25/2020 3.41* 3.77 - 5.28 10*6/mm3 Final   • Hemoglobin 03/25/2020 10.2* 12.0 - 15.9 g/dL Final   • Hematocrit 03/25/2020 31.3* 34.0 - 46.6 % Final   • MCV 03/25/2020 91.8  79.0 - 97.0 fL Final   • MCH 03/25/2020 29.9  26.6 - 33.0 pg Final   • MCHC 03/25/2020 32.6  31.5 - 35.7 g/dL  Final   • RDW 03/25/2020 16.0* 12.3 - 15.4 % Final   • RDW-SD 03/25/2020 53.7  37.0 - 54.0 fl Final   • MPV 03/25/2020 10.4  6.0 - 12.0 fL Final   • Platelets 03/25/2020 190  140 - 450 10*3/mm3 Final   • Neutrophil % 03/25/2020 78.4* 42.7 - 76.0 % Final   • Lymphocyte % 03/25/2020 14.7* 19.6 - 45.3 % Final   • Monocyte % 03/25/2020 3.5* 5.0 - 12.0 % Final   • Eosinophil % 03/25/2020 2.2  0.3 - 6.2 % Final   • Basophil % 03/25/2020 0.4  0.0 - 1.5 % Final   • Immature Grans % 03/25/2020 0.8* 0.0 - 0.5 % Final   • Neutrophils, Absolute 03/25/2020 7.27* 1.70 - 7.00 10*3/mm3 Final   • Lymphocytes, Absolute 03/25/2020 1.36  0.70 - 3.10 10*3/mm3 Final   • Monocytes, Absolute 03/25/2020 0.32  0.10 - 0.90 10*3/mm3 Final   • Eosinophils, Absolute 03/25/2020 0.20  0.00 - 0.40 10*3/mm3 Final   • Basophils, Absolute 03/25/2020 0.04  0.00 - 0.20 10*3/mm3 Final   • Immature Grans, Absolute 03/25/2020 0.07* 0.00 - 0.05 10*3/mm3 Final   • nRBC 03/25/2020 0.0  0.0 - 0.2 /100 WBC Final       RADIOLOGY:  No results found.         Assessment/Plan  Maria C Shrestha is a 59 y.o. year old female  Whom we are seeing for AoCKD on procrit that is stable.    Patient Active Problem List   Diagnosis   • Type 2 diabetes mellitus with stage 3 chronic kidney disease, with long-term current use of insulin (CMS/[facility])   • Mixed diabetic hyperlipidemia associated with type 2 diabetes mellitus (CMS/[facility])   • Hypertension associated with diabetes (CMS/[facility])   • Vitamin D deficiency   • B12 deficiency   • Anemia in CKD (chronic kidney disease)   • Acquired hypothyroidism   • Chronic kidney disease, stage 4 (severe) (CMS/HCC)   • Anemia   • Anxiety   • Deep venous thrombosis of lower extremity (CMS/HCC)   • Disease of liver   • Embolism (CMS/HCC)   • Hypertriglyceridemia   • Hypocalcemia   • Mood disorder (CMS/HCC)   • Morbid obesity (CMS/HCC)   • Sleep apnea   • Acute renal failure superimposed on stage 3 chronic kidney disease (CMS/HCC)   • Anemia of chronic  renal failure   • Hypertension   • Diabetes mellitus (CMS/HCC)   • Thyroid disease   • Hypothyroidism   • Type 2 DM with CKD stage 3 and hypertension (CMS/HCC)   • Vitamin D deficiency          1. AoCKD: s/p iron infusions. Labs reviewed with the pt cr 2.47. Advised increased fluid intake daily, wbc 9.26,  Hg 10.2, plt 190  - Ok for procrit shot today. Goal Hg is >10 and <12.        2. HTN: on amlodipine, carvedilol, lisinopril    3.DM: on trulicity    4.Hypothyroidism: on synthroid  5. Anxiety/depression: on diazepam, celexa  6. Hyperlipidemia: on crestor.  7. CKD stage 4: sees renal. grf 20   pt cr 2.50. Advised increased fluid intake daily,           Rodrigo Huitron MD    3/25/2020    11:39

## 2020-04-01 ENCOUNTER — TELEPHONE (OUTPATIENT)
Dept: ENDOCRINOLOGY | Facility: CLINIC | Age: 60
End: 2020-04-01

## 2020-04-03 ENCOUNTER — TELEPHONE (OUTPATIENT)
Dept: ENDOCRINOLOGY | Facility: CLINIC | Age: 60
End: 2020-04-03

## 2020-04-10 ENCOUNTER — TREATMENT (OUTPATIENT)
Dept: ENDOCRINOLOGY | Facility: CLINIC | Age: 60
End: 2020-04-10

## 2020-04-10 DIAGNOSIS — E11.22 TYPE 2 DIABETES MELLITUS WITH STAGE 3 CHRONIC KIDNEY DISEASE, WITH LONG-TERM CURRENT USE OF INSULIN (HCC): Primary | ICD-10-CM

## 2020-04-10 DIAGNOSIS — E03.9 ACQUIRED HYPOTHYROIDISM: ICD-10-CM

## 2020-04-10 DIAGNOSIS — Z79.4 TYPE 2 DIABETES MELLITUS WITH STAGE 3 CHRONIC KIDNEY DISEASE, WITH LONG-TERM CURRENT USE OF INSULIN (HCC): Primary | ICD-10-CM

## 2020-04-10 DIAGNOSIS — N18.30 TYPE 2 DIABETES MELLITUS WITH STAGE 3 CHRONIC KIDNEY DISEASE, WITH LONG-TERM CURRENT USE OF INSULIN (HCC): Primary | ICD-10-CM

## 2020-04-10 DIAGNOSIS — E53.8 B12 DEFICIENCY: ICD-10-CM

## 2020-04-10 DIAGNOSIS — E78.2 MIXED DIABETIC HYPERLIPIDEMIA ASSOCIATED WITH TYPE 2 DIABETES MELLITUS (HCC): ICD-10-CM

## 2020-04-10 DIAGNOSIS — E11.69 MIXED DIABETIC HYPERLIPIDEMIA ASSOCIATED WITH TYPE 2 DIABETES MELLITUS (HCC): ICD-10-CM

## 2020-04-10 DIAGNOSIS — I15.2 HYPERTENSION ASSOCIATED WITH DIABETES (HCC): ICD-10-CM

## 2020-04-10 DIAGNOSIS — E11.59 HYPERTENSION ASSOCIATED WITH DIABETES (HCC): ICD-10-CM

## 2020-04-10 DIAGNOSIS — E55.9 VITAMIN D DEFICIENCY: ICD-10-CM

## 2020-04-13 ENCOUNTER — APPOINTMENT (OUTPATIENT)
Dept: LAB | Facility: HOSPITAL | Age: 60
End: 2020-04-13

## 2020-04-13 LAB
25(OH)D3 SERPL-MCNC: 19 NG/ML (ref 30–100)
ALBUMIN SERPL-MCNC: 3.7 G/DL (ref 3.5–5)
ALBUMIN UR-MCNC: >440 MG/DL
ALBUMIN/GLOB SERPL: 1.1 G/DL (ref 1.1–2.5)
ALP SERPL-CCNC: 85 U/L (ref 24–120)
ALT SERPL W P-5'-P-CCNC: 16 U/L (ref 0–35)
ANION GAP SERPL CALCULATED.3IONS-SCNC: 8 MMOL/L (ref 4–13)
AST SERPL-CCNC: 22 U/L (ref 7–45)
AUTO MIXED CELLS #: 0.4 10*3/MM3 (ref 0.1–2.6)
AUTO MIXED CELLS %: 4.3 % (ref 0.1–24)
BILIRUB SERPL-MCNC: 0.4 MG/DL (ref 0.1–1)
BUN BLD-MCNC: 32 MG/DL (ref 5–21)
BUN/CREAT SERPL: 11.1
CALCIUM SPEC-SCNC: 9.3 MG/DL (ref 8.4–10.4)
CHLORIDE SERPL-SCNC: 108 MMOL/L (ref 98–110)
CHOLEST SERPL-MCNC: 163 MG/DL (ref 130–200)
CO2 SERPL-SCNC: 25 MMOL/L (ref 24–31)
CREAT BLD-MCNC: 2.87 MG/DL (ref 0.5–1.4)
CREAT UR-MCNC: 81 MG/DL
ERYTHROCYTE [DISTWIDTH] IN BLOOD BY AUTOMATED COUNT: 15.6 % (ref 12.3–15.4)
GFR SERPL CREATININE-BSD FRML MDRD: 17 ML/MIN/1.73
GLOBULIN UR ELPH-MCNC: 3.5 GM/DL
GLUCOSE BLD-MCNC: 194 MG/DL (ref 70–100)
HBA1C MFR BLD: 8 % (ref 4.8–5.9)
HCT VFR BLD AUTO: 34.6 % (ref 34–46.6)
HDLC SERPL-MCNC: 45 MG/DL
HGB BLD-MCNC: 11.3 G/DL (ref 12–15.9)
LDLC SERPL CALC-MCNC: 51 MG/DL (ref 0–99)
LDLC/HDLC SERPL: 1.13 {RATIO}
LYMPHOCYTES # BLD AUTO: 1.7 10*3/MM3 (ref 0.7–3.1)
LYMPHOCYTES NFR BLD AUTO: 17.7 % (ref 19.6–45.3)
MCH RBC QN AUTO: 29.7 PG (ref 26.6–33)
MCHC RBC AUTO-ENTMCNC: 32.7 G/DL (ref 31.5–35.7)
MCV RBC AUTO: 90.8 FL (ref 79–97)
MICROALBUMIN/CREAT UR: NORMAL MG/G{CREAT}
NEUTROPHILS # BLD AUTO: 7.6 10*3/MM3 (ref 1.7–7)
NEUTROPHILS NFR BLD AUTO: 78 % (ref 42.7–76)
PLATELET # BLD AUTO: 248 10*3/MM3 (ref 140–450)
PMV BLD AUTO: 9.1 FL (ref 6–12)
POTASSIUM BLD-SCNC: 3.9 MMOL/L (ref 3.5–5.3)
PROT SERPL-MCNC: 7.2 G/DL (ref 6.3–8.7)
RBC # BLD AUTO: 3.81 10*6/MM3 (ref 3.77–5.28)
SODIUM BLD-SCNC: 141 MMOL/L (ref 135–145)
TRIGL SERPL-MCNC: 336 MG/DL (ref 0–149)
TSH SERPL DL<=0.05 MIU/L-ACNC: 7.25 UIU/ML (ref 0.27–4.2)
VIT B12 BLD-MCNC: 811 PG/ML (ref 211–946)
VLDLC SERPL-MCNC: 67.2 MG/DL
WBC NRBC COR # BLD: 9.7 10*3/MM3 (ref 3.4–10.8)

## 2020-04-13 PROCEDURE — 84443 ASSAY THYROID STIM HORMONE: CPT | Performed by: INTERNAL MEDICINE

## 2020-04-13 PROCEDURE — 82570 ASSAY OF URINE CREATININE: CPT | Performed by: INTERNAL MEDICINE

## 2020-04-13 PROCEDURE — 80053 COMPREHEN METABOLIC PANEL: CPT | Performed by: INTERNAL MEDICINE

## 2020-04-13 PROCEDURE — 85025 COMPLETE CBC W/AUTO DIFF WBC: CPT | Performed by: INTERNAL MEDICINE

## 2020-04-13 PROCEDURE — 82043 UR ALBUMIN QUANTITATIVE: CPT | Performed by: INTERNAL MEDICINE

## 2020-04-13 PROCEDURE — 83036 HEMOGLOBIN GLYCOSYLATED A1C: CPT | Performed by: INTERNAL MEDICINE

## 2020-04-13 PROCEDURE — 36415 COLL VENOUS BLD VENIPUNCTURE: CPT | Performed by: INTERNAL MEDICINE

## 2020-04-13 PROCEDURE — 80061 LIPID PANEL: CPT | Performed by: INTERNAL MEDICINE

## 2020-04-13 PROCEDURE — 82607 VITAMIN B-12: CPT | Performed by: INTERNAL MEDICINE

## 2020-04-13 PROCEDURE — 82306 VITAMIN D 25 HYDROXY: CPT | Performed by: INTERNAL MEDICINE

## 2020-04-15 ENCOUNTER — TELEMEDICINE (OUTPATIENT)
Dept: ENDOCRINOLOGY | Facility: CLINIC | Age: 60
End: 2020-04-15

## 2020-04-15 DIAGNOSIS — E53.8 B12 DEFICIENCY: ICD-10-CM

## 2020-04-15 DIAGNOSIS — N18.30 ANEMIA IN STAGE 3 CHRONIC KIDNEY DISEASE (HCC): ICD-10-CM

## 2020-04-15 DIAGNOSIS — Z79.4 TYPE 2 DIABETES MELLITUS WITH STAGE 3 CHRONIC KIDNEY DISEASE, WITH LONG-TERM CURRENT USE OF INSULIN (HCC): Primary | ICD-10-CM

## 2020-04-15 DIAGNOSIS — E11.59 HYPERTENSION ASSOCIATED WITH DIABETES (HCC): ICD-10-CM

## 2020-04-15 DIAGNOSIS — I15.2 HYPERTENSION ASSOCIATED WITH DIABETES (HCC): ICD-10-CM

## 2020-04-15 DIAGNOSIS — E55.9 VITAMIN D DEFICIENCY: ICD-10-CM

## 2020-04-15 DIAGNOSIS — E78.2 MIXED DIABETIC HYPERLIPIDEMIA ASSOCIATED WITH TYPE 2 DIABETES MELLITUS (HCC): ICD-10-CM

## 2020-04-15 DIAGNOSIS — E11.22 TYPE 2 DIABETES MELLITUS WITH STAGE 3 CHRONIC KIDNEY DISEASE, WITH LONG-TERM CURRENT USE OF INSULIN (HCC): Primary | ICD-10-CM

## 2020-04-15 DIAGNOSIS — N18.30 TYPE 2 DIABETES MELLITUS WITH STAGE 3 CHRONIC KIDNEY DISEASE, WITH LONG-TERM CURRENT USE OF INSULIN (HCC): Primary | ICD-10-CM

## 2020-04-15 DIAGNOSIS — E03.9 ACQUIRED HYPOTHYROIDISM: ICD-10-CM

## 2020-04-15 DIAGNOSIS — E11.69 MIXED DIABETIC HYPERLIPIDEMIA ASSOCIATED WITH TYPE 2 DIABETES MELLITUS (HCC): ICD-10-CM

## 2020-04-15 DIAGNOSIS — D63.1 ANEMIA IN STAGE 3 CHRONIC KIDNEY DISEASE (HCC): ICD-10-CM

## 2020-04-15 PROCEDURE — 99213 OFFICE O/P EST LOW 20 MIN: CPT | Performed by: INTERNAL MEDICINE

## 2020-04-15 RX ORDER — LEVOTHYROXINE SODIUM 88 UG/1
88 TABLET ORAL DAILY
Qty: 30 TABLET | Refills: 11 | Status: SHIPPED | OUTPATIENT
Start: 2020-04-15 | End: 2021-04-30

## 2020-04-15 NOTE — PROGRESS NOTES
Maria C Shrestha is a 60 y.o. female who presents for  evaluation of   Chief Complaint   Patient presents with   • Diabetes                                     TELEHEALTH VISIT    This was a Telehealth Encounter. Benefits and Disadvantages of a Telehealth Visit were discussed and accepted by patient. .  Patient agreed to receive service through Telehealth visit as patient is being compliant with social distancing recommendations imparted by CDC.     You have chosen to receive care through a telehealth visit.  Do you consent to use a video/audio connection for your medical care today? Yes        Primary Care / Referring Provider  Ole Soliman MD    History of Present Illness  Duration/Timing:  Diabetes mellitus type 2  timing constant    quality loss some control     severity high     Severity (Complications/Hospitalizations)  Secondary Microvascular Complications:  Diabetic Nephropathy, No Diabetic Neuropathy    Macrovascular  , Carotid Artery Disease      Context  Diabetes Regimen:  Insulin, Compliant with regimen  Blood Glucose Readings    Running elevated, admitted to stress eating     Diet    counts carbs, eating only 45 g cho per meal     Exercise:  Does not exercise    Associated Signs/Symptoms  Hyperglycemic Symptoms:  No polyuria, No polydipsia, No polyphagia, Weight loss with keto diet before but not now   Hypoglycemic Episodes:  No documented hypoglycemia    Past Medical History:   Diagnosis Date   • Acquired hypothyroidism 2/6/2017   • Allergic    • Anemia    • Anxiety    • Arthritis    • Cellulitis    • Chronic kidney disease     3rd stage   • Depression    • Disease of thyroid gland    • Dyslipidemia    • Elevated cholesterol    • Essential hypertension    • Fatigue    • Gallbladder abscess    • Hearing loss    • Light headed    • Limited range of motion (ROM) of shoulder     right   • Obesity    • Palpitation    • Short of breath on exertion    • Sleep apnea    • Type 2 diabetes mellitus (CMS/[facility])   "  • Type II diabetes mellitus, uncontrolled (CMS/HCC)    • Wears glasses      Family History   Problem Relation Age of Onset   • Diabetes Other    • Cancer Mother    • Cancer Father    • Heart disease Father    • Diabetes Father    • Obesity Father    • Stroke Father    • Cancer Maternal Grandmother    • Uterine cancer Maternal Grandmother    • Diabetes Maternal Grandfather    • Cancer Paternal Grandmother    • Colon cancer Paternal Grandmother    • Diabetes Paternal Grandfather    • Heart disease Paternal Grandfather    • No Known Problems Sister    • No Known Problems Brother    • No Known Problems Daughter    • No Known Problems Son    • No Known Problems Maternal Aunt    • No Known Problems Paternal Aunt    • BRCA 1/2 Neg Hx    • Breast cancer Neg Hx    • Endometrial cancer Neg Hx    • Ovarian cancer Neg Hx      Social History     Tobacco Use   • Smoking status: Never Smoker   • Smokeless tobacco: Never Used   Substance Use Topics   • Alcohol use: No   • Drug use: No         Current Outpatient Medications:   •  allopurinol (ZYLOPRIM) 100 MG tablet, Take 100 mg by mouth 2 (Two) Times a Day., Disp: , Rfl:   •  amLODIPine (NORVASC) 10 MG tablet, Take 10 mg by mouth., Disp: , Rfl:   •  amLODIPine (NORVASC) 5 MG tablet, , Disp: , Rfl:   •  aspirin 81 MG tablet, Take 81 mg by mouth Daily. Stop 7/22/2018, Disp: , Rfl:   •  BD INSULIN SYRINGE U/F 31G X 5/16\" 1 ML misc, AS DIRECTED FOUR TIMES A DAY, Disp: 200 each, Rfl: 0  •  busPIRone (BUSPAR) 10 MG tablet, buspirone 10 mg tablet  Take 2 tablets twice a day by oral route as needed for 90 days., Disp: , Rfl:   •  carvedilol (COREG) 3.125 MG tablet, Take 6.25 mg by mouth 2 (Two) Times a Day., Disp: , Rfl:   •  cetirizine (zyrTEC) 10 MG tablet, Take 10 mg by mouth As Needed., Disp: , Rfl:   •  Cholecalciferol (VITAMIN D3) 71595 units tablet, Vitamin D2 50,000 unit capsule  Take 1 capsule every week by oral route. Sunday, Disp: , Rfl:   •  cinacalcet (SENSIPAR) 30 MG " tablet, Take 1 tablet by mouth Daily., Disp: 30 tablet, Rfl: 3  •  citalopram (CeleXA) 20 MG tablet, Take 20 mg by mouth Daily., Disp: , Rfl:   •  diazePAM (VALIUM) 2 MG tablet, Take 2 mg by mouth As Needed for Anxiety., Disp: , Rfl:   •  Dulaglutide (TRULICITY) 1.5 MG/0.5ML solution pen-injector, Inject 1.5 mg under the skin into the appropriate area as directed Every 7 (Seven) Days., Disp: 6 mL, Rfl: 3  •  Ergocalciferol (VITAMIN D2 PO), Take 1,250 mcg by mouth 1 (One) Time Per Week. VITAMIN D2 1250 MCG (50,000 UNIT) CAPSULE TAKE 1 CAPSULE EVERY WEEK BY ORAL ROUTE, Disp: , Rfl:   •  Evolocumab 140 MG/ML solution auto-injector, Inject 1 mL under the skin into the appropriate area as directed Every 14 (Fourteen) Days for 24 doses. NDC [address]-26, Disp: 6 mL, Rfl: 10  •  fluticasone (VERAMYST) 27.5 MCG/SPRAY nasal spray, 2 sprays into each nostril Daily., Disp: , Rfl:   •  Glucose Blood (BLOOD GLUCOSE TEST) strip, Use 4 x daily, use any brand covered by insurance or same brand as before, Disp: 360 each, Rfl: 3  •  ibuprofen (ADVIL,MOTRIN) 800 MG tablet, Take 800 mg by mouth Every 8 (Eight) Hours As Needed for Mild Pain ., Disp: , Rfl:   •  insulin glargine (LANTUS) 100 UNIT/ML injection, INJECT 200 UNITS DAILY, Disp: 180 mL, Rfl: 3  •  Insulin Lispro (HUMALOG KWIKPEN) 200 UNIT/ML solution pen-injector, Inject 80 Units under the skin into the appropriate area as directed 3 (Three) Times a Day With Meals., Disp: 36 pen, Rfl: 11  •  Insulin Pen Needle (PEN NEEDLES) 31G X 6 MM misc, Use to inject insulin 4 times daily., Disp: 200 each, Rfl: 11  •  lamoTRIgine (LaMICtal) 50 MG tablet dispersible disintegrating tablet, Take 50 mg by mouth Every Night., Disp: , Rfl:   •  Lancets (FREESTYLE) lancets, FOUR TIMES A DAY, Disp: 150 each, Rfl: 11  •  levothyroxine (SYNTHROID) 75 MCG tablet, Take 1 tablet by mouth Daily., Disp: 30 tablet, Rfl: 11  •  lisinopril (PRINIVIL,ZESTRIL) 20 MG tablet, Take 40 mg by mouth 2 (Two) Times  a Day., Disp: , Rfl:   •  lisinopril (PRINIVIL,ZESTRIL) 40 MG tablet, Take 40 mg by mouth., Disp: , Rfl:   •  Melatonin 10 MG tablet, Take  by mouth Every Night., Disp: , Rfl:   •  Multiple Vitamins-Minerals (MULTIVITAMIN ADULT PO), Take 1 tablet by mouth Daily., Disp: , Rfl:   •  Omega-3 Fatty Acids (FISH OIL) 1000 MG capsule capsule, Take 2,000 mg by mouth Daily With Dinner., Disp: , Rfl:   •  potassium gluconate 595 (99 K) MG tablet tablet, potassium gluconate 595 mg (99 mg) tablet  Take 1 tablets every day by oral route., Disp: , Rfl:   •  rosuvastatin (CRESTOR) 10 MG tablet, Take 10 mg by mouth Daily., Disp: , Rfl:   •  sodium bicarbonate 650 MG tablet, Take 650 mg by mouth 4 (Four) Times a Day., Disp: , Rfl:   •  traMADol (ULTRAM) 50 MG tablet, Take 1 tablet by mouth Every 6 (Six) Hours As Needed for Moderate Pain ., Disp: 8 tablet, Rfl: 0    Review of Systems    Review of Systems   Constitutional: Positive for unexpected weight change. Negative for activity change, appetite change, chills, diaphoresis, fatigue and fever.   HENT: Negative for congestion, drooling, ear discharge, ear pain, facial swelling, mouth sores, nosebleeds, postnasal drip, sinus pressure, sneezing, sore throat, tinnitus, trouble swallowing and voice change.    Eyes: Negative.  Negative for photophobia, pain, discharge, redness and itching.   Respiratory: Negative.  Negative for apnea, cough, choking, chest tightness, shortness of breath, wheezing and stridor.    Cardiovascular: Negative.  Negative for chest pain, palpitations and leg swelling.   Gastrointestinal: Negative.  Negative for abdominal distention, abdominal pain, constipation, diarrhea, nausea and vomiting.   Endocrine: Positive for polydipsia, polyphagia and polyuria. Negative for cold intolerance and heat intolerance.   Genitourinary: Negative for difficulty urinating, dysuria, flank pain and frequency.   Musculoskeletal: Positive for arthralgias, back pain and myalgias.  Negative for gait problem, joint swelling, neck pain and neck stiffness.   Skin: Negative for color change, pallor, rash and wound.   Allergic/Immunologic: Negative for environmental allergies, food allergies and immunocompromised state.   Neurological: Negative for dizziness, tremors, seizures, syncope, facial asymmetry, speech difficulty, weakness, light-headedness, numbness and headaches.   Hematological: Negative for adenopathy. Does not bruise/bleed easily.   Psychiatric/Behavioral: Negative for agitation, behavioral problems, confusion, decreased concentration, dysphoric mood, hallucinations, self-injury, sleep disturbance and suicidal ideas. The patient is not nervous/anxious and is not hyperactive.         Objective:     LMP  (LMP Unknown)     Physical Exam   Constitutional: She appears well-developed and well-nourished. No distress.   HENT:   Head: Normocephalic and atraumatic.   Right Ear: External ear normal.   Left Ear: External ear normal.   Nose: Nose normal.   Eyes: Conjunctivae and EOM are normal. Right eye exhibits no discharge. Left eye exhibits no discharge.   Neck: Normal range of motion. Neck supple. No tracheal deviation present. No thyromegaly present.   Pulmonary/Chest: Effort normal. No respiratory distress.   Abdominal: She exhibits no distension.   Musculoskeletal: She exhibits no edema, tenderness or deformity.   Neurological: She is alert. No cranial nerve deficit. She exhibits normal muscle tone. Coordination normal.   Skin: Rash noted. She is not diaphoretic. No erythema. No pallor.   Psychiatric: She has a normal mood and affect. Her behavior is normal. Judgment and thought content normal.       Lab Review            Assessment/Plan       ICD-10-CM ICD-9-CM   1. Type 2 diabetes mellitus with stage 3 chronic kidney disease, with long-term current use of insulin (CMS/[facility]) E11.22 250.40    N18.3 585.3    Z79.4 V58.67   2. Mixed diabetic hyperlipidemia associated with type 2 diabetes  mellitus (CMS/[facility]) E11.69 250.80    E78.2 272.2   3. Hypertension associated with diabetes (CMS/[facility]) E11.59 250.80    I10 401.9   4. B12 deficiency E53.8 266.2   5. Vitamin D deficiency E55.9 268.9   6. Acquired hypothyroidism E03.9 244.9       Glycemic Management:   Lab Results   Component Value Date    HGBA1C 8.0 (H) 04/13/2020    HGBA1C 7.6 (H) 01/08/2020    HGBA1C 7.0 (H) 12/18/2019     Lab Results   Component Value Date    GLUCOSE 194 (H) 04/13/2020    BUN 32 (H) 04/13/2020    CREATININE 2.87 (H) 04/13/2020    EGFRIFNONA 17 (L) 04/13/2020    BCR 11.1 04/13/2020    CO2 25.0 04/13/2020    CALCIUM 9.3 04/13/2020    ALBUMIN 3.70 04/13/2020    AST 22 04/13/2020    ALT 16 04/13/2020     Lab Results   Component Value Date    WBC 9.70 04/13/2020    HGB 11.3 (L) 04/13/2020    HCT 34.6 04/13/2020    MCV 90.8 04/13/2020     04/13/2020     Lab Results   Component Value Date    CREATININE 2.87 (H) 04/13/2020    CREATININE 2.47 (H) 03/25/2020    CREATININE 2.50 (H) 02/05/2020    CREATININE 2.21 (H) 01/08/2020    CREATININE 2.20 (H) 01/08/2020          Trulicity 1.5 mg weekly       Humulin U 500 before     Lantus 160 at night     invokana , I wanted to add but GFR less than 30     Will give rx for u200 humalog up to 20-40 units with meals -- needs up to 80 w meals     Using deidra - reader     Lipid Management    Lab Results   Component Value Date    TRIG 336 (H) 04/13/2020    TRIG 324 (H) 01/08/2020    TRIG 343 (H) 12/18/2019     Lab Results   Component Value Date    HDL 45 (L) 04/13/2020    HDL 37 (L) 01/08/2020    HDL 39 (L) 12/18/2019     No components found for: LDLCALC  Lab Results   Component Value Date    LDL 51 04/13/2020    LDL 98 01/08/2020     (H) 12/18/2019     Lab Results   Component Value Date    LDL 51 04/13/2020    LDL 98 01/08/2020     (H) 12/18/2019         on Crestor 20 mg daily   Generic causing myalgias, higher doses not tolerated    Need to add repatha since LDL goal is less than 70  due to carotid artery disease and already on maximal tolerated statin and intolerant to zetia     Now on repatha and working     Blood Pressure Management:    There were no vitals filed for this visit.      Per nephrology , stable function       Microvascular Complication Monitoring:  No Microalbuminuria, No Diabetic Retinopathy, Date of last eye exam 07/18/2016, No Diabetic Neuropathy  on ACE i     ckd stage IV    followed by nephrology     I suggest that she doesn't consume more than 140 g protein per day   Plant better than animal but restricted on keto     --      Lab Results   Component Value Date    CREATININE 2.87 (H) 04/13/2020    BUN 32 (H) 04/13/2020     04/13/2020    K 3.9 04/13/2020     04/13/2020    CO2 25.0 04/13/2020         Lab Results   Component Value Date    CREATININE 2.87 (H) 04/13/2020    CREATININE 2.47 (H) 03/25/2020    CREATININE 2.50 (H) 02/05/2020         Immunizations:  Last pneumococcal immunization pneumovax before age 65     Flu Shot will have in Mid Nov 2016       Preventive Care:  No smoking      Weight Related:   Wt Readings from Last 3 Encounters:   03/25/20 (!) 179 kg (395 lb)   03/25/20 (!) 179 kg (395 lb)   02/05/20 (!) 179 kg (394 lb)     There is no height or weight on file to calculate BMI.      prefers no bariatric surgery    Now on cpap       Bone Health    Lab Results   Component Value Date    PTH 38.3 09/04/2019    CALCIUM 9.3 04/13/2020     For JARETH     Was having hypercalcemia with rocatrol 0.25 three times weekly and on calcium    Now on sensipar 30 mg daily but without calcium -- not on sensipar anymore     Start calcium low dose 400 mg daily     DXA No 2018, osteopenia left femoral neck      Monitor PTH , no more than 150 , no less than 60       Thyroid Health  Lab Results   Component Value Date    TSH 7.250 (H) 04/13/2020     On levothyroxine 50 ug daily - increase to 75 mcgs daily - increase to 88     Other Diabetes Related Aspects       Lab Results    Component Value Date    NOJYYILV80 811 04/13/2020     Lab Results   Component Value Date    WBC 9.70 04/13/2020    HGB 11.3 (L) 04/13/2020    HCT 34.6 04/13/2020    MCV 90.8 04/13/2020     04/13/2020     Lab Results   Component Value Date    IRON 77 08/07/2019    TIBC 276 08/07/2019    FERRITIN 90.10 08/07/2019       Anemia , managed by nephrology   Deciding vs epo vs iron   But now   DUB, may need hysterectomy       Systolic Murmur, AS? Echo   Could be due to anemia     Echo and carotid US from 7-17 , normal echo and less than 50% blockage in carotids, repeat     I reviewed and summarized records from Ole Soliman MD from present year  and I reviewed / ordered labs.     No orders of the defined types were placed in this encounter.        A copy of my note was sent to Ole Soliman MD    Please see my above opinion and suggestions.       I spent 15 minutes reviewing patient electronic chart , reviewing medications , past history , active problems.   I provided advice regarding management of medical conditions, refilled prescriptions , ordered labs and arranged for future appointment.   Patient was advised to contact us if there were any unanswered questions or ongoing concerns.

## 2020-04-22 ENCOUNTER — LAB (OUTPATIENT)
Dept: LAB | Facility: HOSPITAL | Age: 60
End: 2020-04-22

## 2020-04-22 ENCOUNTER — INFUSION (OUTPATIENT)
Dept: ONCOLOGY | Facility: HOSPITAL | Age: 60
End: 2020-04-22

## 2020-04-22 ENCOUNTER — OFFICE VISIT (OUTPATIENT)
Dept: ONCOLOGY | Facility: CLINIC | Age: 60
End: 2020-04-22

## 2020-04-22 VITALS
SYSTOLIC BLOOD PRESSURE: 163 MMHG | WEIGHT: 293 LBS | RESPIRATION RATE: 17 BRPM | BODY MASS INDEX: 47.09 KG/M2 | TEMPERATURE: 97.8 F | HEART RATE: 68 BPM | DIASTOLIC BLOOD PRESSURE: 54 MMHG | HEIGHT: 66 IN | OXYGEN SATURATION: 100 %

## 2020-04-22 VITALS
WEIGHT: 293 LBS | DIASTOLIC BLOOD PRESSURE: 72 MMHG | HEIGHT: 65 IN | RESPIRATION RATE: 16 BRPM | BODY MASS INDEX: 48.82 KG/M2 | OXYGEN SATURATION: 96 % | HEART RATE: 68 BPM | TEMPERATURE: 97 F | SYSTOLIC BLOOD PRESSURE: 146 MMHG

## 2020-04-22 DIAGNOSIS — N18.30 ANEMIA IN STAGE 3 CHRONIC KIDNEY DISEASE (HCC): ICD-10-CM

## 2020-04-22 DIAGNOSIS — D63.1 ANEMIA IN STAGE 3 CHRONIC KIDNEY DISEASE (HCC): Primary | ICD-10-CM

## 2020-04-22 DIAGNOSIS — D63.1 ANEMIA IN STAGE 3 CHRONIC KIDNEY DISEASE (HCC): ICD-10-CM

## 2020-04-22 DIAGNOSIS — N18.30 ANEMIA IN STAGE 3 CHRONIC KIDNEY DISEASE (HCC): Primary | ICD-10-CM

## 2020-04-22 LAB
ALBUMIN SERPL-MCNC: 3.3 G/DL (ref 3.5–5.2)
ALBUMIN/GLOB SERPL: 1 G/DL
ALP SERPL-CCNC: 76 U/L (ref 39–117)
ALT SERPL W P-5'-P-CCNC: 10 U/L (ref 1–33)
ANION GAP SERPL CALCULATED.3IONS-SCNC: 14 MMOL/L (ref 5–15)
AST SERPL-CCNC: 12 U/L (ref 1–32)
BASOPHILS # BLD AUTO: 0.03 10*3/MM3 (ref 0–0.2)
BASOPHILS NFR BLD AUTO: 0.4 % (ref 0–1.5)
BILIRUB SERPL-MCNC: 0.2 MG/DL (ref 0.2–1.2)
BUN BLD-MCNC: 33 MG/DL (ref 8–23)
BUN/CREAT SERPL: 11.9 (ref 7–25)
CALCIUM SPEC-SCNC: 8.8 MG/DL (ref 8.6–10.5)
CHLORIDE SERPL-SCNC: 107 MMOL/L (ref 98–107)
CO2 SERPL-SCNC: 22 MMOL/L (ref 22–29)
CREAT BLD-MCNC: 2.77 MG/DL (ref 0.57–1)
DEPRECATED RDW RBC AUTO: 53.5 FL (ref 37–54)
EOSINOPHIL # BLD AUTO: 0.22 10*3/MM3 (ref 0–0.4)
EOSINOPHIL NFR BLD AUTO: 2.7 % (ref 0.3–6.2)
ERYTHROCYTE [DISTWIDTH] IN BLOOD BY AUTOMATED COUNT: 15.9 % (ref 12.3–15.4)
FERRITIN SERPL-MCNC: 135.1 NG/ML (ref 13–150)
GFR SERPL CREATININE-BSD FRML MDRD: 17 ML/MIN/1.73
GLOBULIN UR ELPH-MCNC: 3.2 GM/DL
GLUCOSE BLD-MCNC: 215 MG/DL (ref 65–99)
HCT VFR BLD AUTO: 32.3 % (ref 34–46.6)
HGB BLD-MCNC: 10.5 G/DL (ref 12–15.9)
IMM GRANULOCYTES # BLD AUTO: 0.05 10*3/MM3 (ref 0–0.05)
IMM GRANULOCYTES NFR BLD AUTO: 0.6 % (ref 0–0.5)
IRON 24H UR-MRATE: 72 MCG/DL (ref 37–145)
IRON SATN MFR SERPL: 25 % (ref 20–50)
LYMPHOCYTES # BLD AUTO: 1.77 10*3/MM3 (ref 0.7–3.1)
LYMPHOCYTES NFR BLD AUTO: 21.4 % (ref 19.6–45.3)
MCH RBC QN AUTO: 30.3 PG (ref 26.6–33)
MCHC RBC AUTO-ENTMCNC: 32.5 G/DL (ref 31.5–35.7)
MCV RBC AUTO: 93.1 FL (ref 79–97)
MONOCYTES # BLD AUTO: 0.4 10*3/MM3 (ref 0.1–0.9)
MONOCYTES NFR BLD AUTO: 4.8 % (ref 5–12)
NEUTROPHILS # BLD AUTO: 5.81 10*3/MM3 (ref 1.7–7)
NEUTROPHILS NFR BLD AUTO: 70.1 % (ref 42.7–76)
NRBC BLD AUTO-RTO: 0 /100 WBC (ref 0–0.2)
PLATELET # BLD AUTO: 204 10*3/MM3 (ref 140–450)
PMV BLD AUTO: 10.3 FL (ref 6–12)
POTASSIUM BLD-SCNC: 3.8 MMOL/L (ref 3.5–5.2)
PROT SERPL-MCNC: 6.5 G/DL (ref 6–8.5)
RBC # BLD AUTO: 3.47 10*6/MM3 (ref 3.77–5.28)
SODIUM BLD-SCNC: 143 MMOL/L (ref 136–145)
TIBC SERPL-MCNC: 288 MCG/DL (ref 298–536)
TRANSFERRIN SERPL-MCNC: 193 MG/DL (ref 200–360)
WBC NRBC COR # BLD: 8.28 10*3/MM3 (ref 3.4–10.8)

## 2020-04-22 PROCEDURE — 36415 COLL VENOUS BLD VENIPUNCTURE: CPT

## 2020-04-22 PROCEDURE — 96372 THER/PROPH/DIAG INJ SC/IM: CPT

## 2020-04-22 PROCEDURE — 84466 ASSAY OF TRANSFERRIN: CPT

## 2020-04-22 PROCEDURE — 80053 COMPREHEN METABOLIC PANEL: CPT

## 2020-04-22 PROCEDURE — 99213 OFFICE O/P EST LOW 20 MIN: CPT | Performed by: INTERNAL MEDICINE

## 2020-04-22 PROCEDURE — 83540 ASSAY OF IRON: CPT

## 2020-04-22 PROCEDURE — 85025 COMPLETE CBC W/AUTO DIFF WBC: CPT

## 2020-04-22 PROCEDURE — 25010000002 EPOETIN ALFA-EPBX 40000 UNIT/ML SOLUTION: Performed by: INTERNAL MEDICINE

## 2020-04-22 PROCEDURE — 82728 ASSAY OF FERRITIN: CPT

## 2020-04-22 RX ADMIN — EPOETIN ALFA-EPBX 40000 UNITS: 40000 INJECTION, SOLUTION INTRAVENOUS; SUBCUTANEOUS at 09:24

## 2020-04-22 NOTE — PROGRESS NOTES
[facility]  HEMATOLOGY & ONCOLOGY    Cancer Staging Information:  Cancer Staging  No matching staging information was found for the patient.      Subjective     VISIT DIAGNOSIS:   Encounter Diagnosis   Name Primary?   • Anemia in stage 3 chronic kidney disease (CMS/HCC)        REASON FOR VISIT:     Chief Complaint   Patient presents with   • Anemia     Here for procrit        HEMATOLOGY / ONCOLOGY HISTORY:    No history exists.           INTERVAL HISTORY  Patient ID: Maria C Shrestha is a 60 y.o. year old female Cancer Staging    12/3/19: she has a lot of family stressors that is making her depressed.    4/22/2020: No new issues.   - Denies sob,cp,n/v/dizziness.occassional blood streaked stool from hemorrhoids.  --rest of ros unremarkable. Physical exam unremarkable.    Past Medical History:   Past Medical History:   Diagnosis Date   • Acquired hypothyroidism 2/6/2017   • Allergic    • Anemia    • Anxiety    • Arthritis    • Cellulitis    • Chronic kidney disease     3rd stage   • Depression    • Disease of thyroid gland    • Dyslipidemia    • Elevated cholesterol    • Essential hypertension    • Fatigue    • Gallbladder abscess    • Hearing loss    • Light headed    • Limited range of motion (ROM) of shoulder     right   • Obesity    • Palpitation    • Short of breath on exertion    • Sleep apnea    • Type 2 diabetes mellitus (CMS/HCC)    • Type II diabetes mellitus, uncontrolled (CMS/HCC)    • Wears glasses      Past Surgical History:   Past Surgical History:   Procedure Laterality Date   • ADENOIDECTOMY     • ANAL FISTULA REPAIR      x 2    • CHOLECYSTECTOMY     • COLONOSCOPY  01/02/2014   • COLONOSCOPY N/A 3/22/2017    Procedure: COLONOSCOPY WITH ANESTHESIA;  Surgeon: Mono Linder MD;  Location: [facility] ENDOSCOPY;  Service:    • D&C HYSTEROSCOPY N/A 7/25/2018    Procedure: DILATATION AND CURETTAGE HYSTEROSCOPY;  Surgeon: Michael Castaneda MD;  Location: [facility] OR;  Service:  "Obstetrics/Gynecology   • DILATATION AND CURETTAGE      X 2   • ENDOSCOPY     • TONSILLECTOMY       Social History:   Social History     Socioeconomic History   • Marital status:      Spouse name: Not on file   • Number of children: Not on file   • Years of education: Not on file   • Highest education level: Not on file   Tobacco Use   • Smoking status: Never Smoker   • Smokeless tobacco: Never Used   Substance and Sexual Activity   • Alcohol use: No   • Drug use: No   • Sexual activity: Never     Birth control/protection: Post-menopausal     Family History:   Family History   Problem Relation Age of Onset   • Diabetes Other    • Cancer Mother    • Cancer Father    • Heart disease Father    • Diabetes Father    • Obesity Father    • Stroke Father    • Cancer Maternal Grandmother    • Uterine cancer Maternal Grandmother    • Diabetes Maternal Grandfather    • Cancer Paternal Grandmother    • Colon cancer Paternal Grandmother    • Diabetes Paternal Grandfather    • Heart disease Paternal Grandfather    • No Known Problems Sister    • No Known Problems Brother    • No Known Problems Daughter    • No Known Problems Son    • No Known Problems Maternal Aunt    • No Known Problems Paternal Aunt    • BRCA 1/2 Neg Hx    • Breast cancer Neg Hx    • Endometrial cancer Neg Hx    • Ovarian cancer Neg Hx        Review of Systems   See HPI otherwise unremarkable.  Performance Status:  Asymptomatic    Medications:    Current Outpatient Medications   Medication Sig Dispense Refill   • allopurinol (ZYLOPRIM) 100 MG tablet Take 100 mg by mouth 2 (Two) Times a Day.     • amLODIPine (NORVASC) 10 MG tablet Take 10 mg by mouth.     • amLODIPine (NORVASC) 5 MG tablet      • aspirin 81 MG tablet Take 81 mg by mouth Daily. Stop 7/22/2018     • BD INSULIN SYRINGE U/F 31G X 5/16\" 1 ML misc AS DIRECTED FOUR TIMES A  each 0   • busPIRone (BUSPAR) 10 MG tablet buspirone 10 mg tablet   Take 2 tablets twice a day by oral route as " needed for 90 days.     • carvedilol (COREG) 3.125 MG tablet Take 6.25 mg by mouth 2 (Two) Times a Day.     • cetirizine (zyrTEC) 10 MG tablet Take 10 mg by mouth As Needed.     • Cholecalciferol (VITAMIN D3) 41725 units tablet Vitamin D2 50,000 unit capsule   Take 1 capsule every week by oral route. Sunday     • cinacalcet (SENSIPAR) 30 MG tablet Take 1 tablet by mouth Daily. 30 tablet 3   • citalopram (CeleXA) 20 MG tablet Take 20 mg by mouth Daily.     • diazePAM (VALIUM) 2 MG tablet Take 2 mg by mouth As Needed for Anxiety.     • Dulaglutide (TRULICITY) 1.5 MG/0.5ML solution pen-injector Inject 1.5 mg under the skin into the appropriate area as directed Every 7 (Seven) Days. 6 mL 3   • Ergocalciferol (VITAMIN D2 PO) Take 1,250 mcg by mouth 1 (One) Time Per Week. VITAMIN D2 1250 MCG (50,000 UNIT) CAPSULE TAKE 1 CAPSULE EVERY WEEK BY ORAL ROUTE     • Evolocumab 140 MG/ML solution auto-injector Inject 1 mL under the skin into the appropriate area as directed Every 14 (Fourteen) Days for 24 doses. NDC [address]-85 6 mL 10   • fluticasone (VERAMYST) 27.5 MCG/SPRAY nasal spray 2 sprays into each nostril Daily.     • Glucose Blood (BLOOD GLUCOSE TEST) strip Use 4 x daily, use any brand covered by insurance or same brand as before 360 each 3   • ibuprofen (ADVIL,MOTRIN) 800 MG tablet Take 800 mg by mouth Every 8 (Eight) Hours As Needed for Mild Pain .     • insulin glargine (LANTUS) 100 UNIT/ML injection INJECT 200 UNITS DAILY 180 mL 3   • Insulin Lispro (HUMALOG KWIKPEN) 200 UNIT/ML solution pen-injector Inject 80 Units under the skin into the appropriate area as directed 3 (Three) Times a Day With Meals. 36 pen 11   • Insulin Pen Needle (PEN NEEDLES) 31G X 6 MM misc Use to inject insulin 4 times daily. 200 each 11   • lamoTRIgine (LaMICtal) 50 MG tablet dispersible disintegrating tablet Take 50 mg by mouth Every Night.     • Lancets (FREESTYLE) lancets FOUR TIMES A  each 11   • levothyroxine (Synthroid) 88 MCG  "tablet Take 1 tablet by mouth Daily. 30 tablet 11   • lisinopril (PRINIVIL,ZESTRIL) 20 MG tablet Take 40 mg by mouth 2 (Two) Times a Day.     • lisinopril (PRINIVIL,ZESTRIL) 40 MG tablet Take 40 mg by mouth.     • Melatonin 10 MG tablet Take  by mouth Every Night.     • Multiple Vitamins-Minerals (MULTIVITAMIN ADULT PO) Take 1 tablet by mouth Daily.     • Omega-3 Fatty Acids (FISH OIL) 1000 MG capsule capsule Take 2,000 mg by mouth Daily With Dinner.     • potassium gluconate 595 (99 K) MG tablet tablet potassium gluconate 595 mg (99 mg) tablet   Take 1 tablets every day by oral route.     • rosuvastatin (CRESTOR) 10 MG tablet Take 10 mg by mouth Daily.     • sodium bicarbonate 650 MG tablet Take 650 mg by mouth 4 (Four) Times a Day.     • traMADol (ULTRAM) 50 MG tablet Take 1 tablet by mouth Every 6 (Six) Hours As Needed for Moderate Pain . 8 tablet 0     No current facility-administered medications for this visit.        ALLERGIES:    Allergies   Allergen Reactions   • Codeine Nausea Only       Objective      Vitals:    04/22/20 0823   BP: 146/72   Pulse: 68   Resp: 16   Temp: 97 °F (36.1 °C)   TempSrc: Temporal   SpO2: 96%   Weight: (!) 181 kg (398 lb 12.8 oz)   Height: 165.1 cm (65\")   PainSc: 0-No pain         Current Status 4/22/2020   ECOG score 1         Physical Examremains the same  General Appearance:obese female.  Patient is awake, alert, oriented and in no acute distress. Patient is welldeveloped, wellnourished, and appears stated age.  HEENT: Normocephalic. Sclerae clear, conjunctiva pink, extraocular movements intact, pupils, round, reactive to light and  accommodation. Mouth and throat are clear with moist oral mucosa.  NECK: Supple, no jugular venous distention, thyroid not enlarged.  LYMPH: No cervical, supraclavicular, axillary, or inguinal lymphadenopathy.  CHEST: Equal bilateral expansion, AP  diameter normal, resonant percussion note  LUNGS: Good air movement, no rales, rhonchi, rubs or wheezes " with auscultation  CARDIO: Regular sinus rhythm, no murmurs, gallops or rubs.  ABDOMEN: obese abdomen. Nondistended, soft, No tenderness, no guarding, no rebound, No hepatosplenomegaly. No abdominal masses. Bowel sounds positive. No hernia  GENITALIA: Not examined.  BREASTS: Not examined.  MUSKEL: No joint swelling, decreased motion, or inflammation  EXTREMS: kristie YAZAN. No clubbing, cyanosis, No varicose veins.  NEURO: Grossly nonfocal, Gait is coordinated and smooth, Cognition is preserved.  SKIN: No rashes, no ecchymoses, no petechia.  PSYCH: Oriented to time, place and person. Memory is preserved. Mood and affect appear normal  RECENT LABS:  Lab on 04/22/2020   Component Date Value Ref Range Status   • Glucose 04/22/2020 215* 65 - 99 mg/dL Final   • BUN 04/22/2020 33* 8 - 23 mg/dL Final   • Creatinine 04/22/2020 2.77* 0.57 - 1.00 mg/dL Final   • Sodium 04/22/2020 143  136 - 145 mmol/L Final   • Potassium 04/22/2020 3.8  3.5 - 5.2 mmol/L Final   • Chloride 04/22/2020 107  98 - 107 mmol/L Final   • CO2 04/22/2020 22.0  22.0 - 29.0 mmol/L Final   • Calcium 04/22/2020 8.8  8.6 - 10.5 mg/dL Final   • Total Protein 04/22/2020 6.5  6.0 - 8.5 g/dL Final   • Albumin 04/22/2020 3.30* 3.50 - 5.20 g/dL Final   • ALT (SGPT) 04/22/2020 10  1 - 33 U/L Final   • AST (SGOT) 04/22/2020 12  1 - 32 U/L Final   • Alkaline Phosphatase 04/22/2020 76  39 - 117 U/L Final   • Total Bilirubin 04/22/2020 0.2  0.2 - 1.2 mg/dL Final   • eGFR Non African Amer 04/22/2020 17* >60 mL/min/1.73 Final   • Globulin 04/22/2020 3.2  gm/dL Final   • A/G Ratio 04/22/2020 1.0  g/dL Final   • BUN/Creatinine Ratio 04/22/2020 11.9  7.0 - 25.0 Final   • Anion Gap 04/22/2020 14.0  5.0 - 15.0 mmol/L Final   • Ferritin 04/22/2020 135.10  13.00 - 150.00 ng/mL Final   • Iron 04/22/2020 72  37 - 145 mcg/dL Final   • Iron Saturation 04/22/2020 25  20 - 50 % Final   • Transferrin 04/22/2020 193* 200 - 360 mg/dL Final   • TIBC 04/22/2020 288* 298 - 536 mcg/dL Final   •  WBC 04/22/2020 8.28  3.40 - 10.80 10*3/mm3 Final   • RBC 04/22/2020 3.47* 3.77 - 5.28 10*6/mm3 Final   • Hemoglobin 04/22/2020 10.5* 12.0 - 15.9 g/dL Final   • Hematocrit 04/22/2020 32.3* 34.0 - 46.6 % Final   • MCV 04/22/2020 93.1  79.0 - 97.0 fL Final   • MCH 04/22/2020 30.3  26.6 - 33.0 pg Final   • MCHC 04/22/2020 32.5  31.5 - 35.7 g/dL Final   • RDW 04/22/2020 15.9* 12.3 - 15.4 % Final   • RDW-SD 04/22/2020 53.5  37.0 - 54.0 fl Final   • MPV 04/22/2020 10.3  6.0 - 12.0 fL Final   • Platelets 04/22/2020 204  140 - 450 10*3/mm3 Final   • Neutrophil % 04/22/2020 70.1  42.7 - 76.0 % Final   • Lymphocyte % 04/22/2020 21.4  19.6 - 45.3 % Final   • Monocyte % 04/22/2020 4.8* 5.0 - 12.0 % Final   • Eosinophil % 04/22/2020 2.7  0.3 - 6.2 % Final   • Basophil % 04/22/2020 0.4  0.0 - 1.5 % Final   • Immature Grans % 04/22/2020 0.6* 0.0 - 0.5 % Final   • Neutrophils, Absolute 04/22/2020 5.81  1.70 - 7.00 10*3/mm3 Final   • Lymphocytes, Absolute 04/22/2020 1.77  0.70 - 3.10 10*3/mm3 Final   • Monocytes, Absolute 04/22/2020 0.40  0.10 - 0.90 10*3/mm3 Final   • Eosinophils, Absolute 04/22/2020 0.22  0.00 - 0.40 10*3/mm3 Final   • Basophils, Absolute 04/22/2020 0.03  0.00 - 0.20 10*3/mm3 Final   • Immature Grans, Absolute 04/22/2020 0.05  0.00 - 0.05 10*3/mm3 Final   • nRBC 04/22/2020 0.0  0.0 - 0.2 /100 WBC Final       RADIOLOGY:  No results found.         Assessment/Plan  Maria C Shrestha is a 60 y.o. year old female  Whom we are seeing for AoCKD on procrit that is stable.    Patient Active Problem List   Diagnosis   • Type 2 diabetes mellitus with stage 3 chronic kidney disease, with long-term current use of insulin (CMS/HCC)   • Mixed diabetic hyperlipidemia associated with type 2 diabetes mellitus (CMS/HCC)   • Hypertension associated with diabetes (CMS/HCC)   • Vitamin D deficiency   • B12 deficiency   • Anemia in CKD (chronic kidney disease)   • Acquired hypothyroidism   • Chronic kidney disease, stage 4 (severe) (CMS/HCC)    • Anemia   • Anxiety   • Deep venous thrombosis of lower extremity (CMS/HCC)   • Disease of liver   • Embolism (CMS/HCC)   • Hypertriglyceridemia   • Hypocalcemia   • Mood disorder (CMS/HCC)   • Morbid obesity (CMS/HCC)   • Sleep apnea   • Acute renal failure superimposed on stage 3 chronic kidney disease (CMS/HCC)   • Anemia of chronic renal failure   • Hypertension   • Diabetes mellitus (CMS/HCC)   • Thyroid disease   • Hypothyroidism   • Type 2 DM with CKD stage 3 and hypertension (CMS/HCC)   • Vitamin D deficiency          1. AoCKD: s/p iron infusions. Iron 72, %sat 25.  - Labs reviewed with the pt cr 2.77. wbc 8.28,  Hg 10.5, plt 204  - Ok for procrit shot today. Goal Hg is >10 and <12.        2. HTN: on amlodipine, carvedilol, lisinopril    3.DM: on trulicity    4.Hypothyroidism: on synthroid  5. Anxiety/depression: on diazepam, celexa  6. Hyperlipidemia: on crestor.  7. CKD stage 4: sees renal. grf 20   pt cr 2.50. Advised increased fluid intake daily,           Rodrigo Huitron MD    4/22/2020    09:34

## 2020-05-11 ENCOUNTER — TELEPHONE (OUTPATIENT)
Dept: ENDOCRINOLOGY | Facility: CLINIC | Age: 60
End: 2020-05-11

## 2020-05-11 NOTE — TELEPHONE ENCOUNTER
Pt needs lantus vials , trulicity pens ,carvedilol  and lisinopril refilled [facility] 90 day supply     Ph- [phone] option 2 for verbal and option 3 for fax       Thank You

## 2020-05-12 RX ORDER — INSULIN GLARGINE 100 [IU]/ML
INJECTION, SOLUTION SUBCUTANEOUS
Qty: 180 ML | Refills: 3 | Status: SHIPPED | OUTPATIENT
Start: 2020-05-12 | End: 2020-10-21 | Stop reason: SDUPTHER

## 2020-05-20 ENCOUNTER — TELEPHONE (OUTPATIENT)
Dept: ONCOLOGY | Facility: CLINIC | Age: 60
End: 2020-05-20

## 2020-05-20 ENCOUNTER — OFFICE VISIT (OUTPATIENT)
Dept: ONCOLOGY | Facility: CLINIC | Age: 60
End: 2020-05-20

## 2020-05-20 ENCOUNTER — LAB (OUTPATIENT)
Dept: LAB | Facility: HOSPITAL | Age: 60
End: 2020-05-20

## 2020-05-20 ENCOUNTER — INFUSION (OUTPATIENT)
Dept: ONCOLOGY | Facility: HOSPITAL | Age: 60
End: 2020-05-20

## 2020-05-20 VITALS
OXYGEN SATURATION: 97 % | BODY MASS INDEX: 48.82 KG/M2 | TEMPERATURE: 98.3 F | DIASTOLIC BLOOD PRESSURE: 72 MMHG | HEART RATE: 73 BPM | RESPIRATION RATE: 18 BRPM | SYSTOLIC BLOOD PRESSURE: 148 MMHG | WEIGHT: 293 LBS | HEIGHT: 65 IN

## 2020-05-20 VITALS
HEIGHT: 65 IN | HEART RATE: 70 BPM | TEMPERATURE: 97.6 F | SYSTOLIC BLOOD PRESSURE: 148 MMHG | OXYGEN SATURATION: 97 % | BODY MASS INDEX: 48.82 KG/M2 | WEIGHT: 293 LBS | DIASTOLIC BLOOD PRESSURE: 72 MMHG | RESPIRATION RATE: 16 BRPM

## 2020-05-20 DIAGNOSIS — N18.30 ANEMIA IN STAGE 3 CHRONIC KIDNEY DISEASE (HCC): Primary | ICD-10-CM

## 2020-05-20 DIAGNOSIS — D63.1 ANEMIA IN STAGE 3 CHRONIC KIDNEY DISEASE (HCC): Primary | ICD-10-CM

## 2020-05-20 DIAGNOSIS — D63.1 ANEMIA IN STAGE 3 CHRONIC KIDNEY DISEASE (HCC): ICD-10-CM

## 2020-05-20 DIAGNOSIS — N18.30 ANEMIA IN STAGE 3 CHRONIC KIDNEY DISEASE (HCC): ICD-10-CM

## 2020-05-20 LAB
ALBUMIN SERPL-MCNC: 3.5 G/DL (ref 3.5–5.2)
ALBUMIN/GLOB SERPL: 1.1 G/DL
ALP SERPL-CCNC: 74 U/L (ref 39–117)
ALT SERPL W P-5'-P-CCNC: 12 U/L (ref 1–33)
ANION GAP SERPL CALCULATED.3IONS-SCNC: 13 MMOL/L (ref 5–15)
AST SERPL-CCNC: 15 U/L (ref 1–32)
BASOPHILS # BLD AUTO: 0.04 10*3/MM3 (ref 0–0.2)
BASOPHILS NFR BLD AUTO: 0.4 % (ref 0–1.5)
BILIRUB SERPL-MCNC: 0.2 MG/DL (ref 0.2–1.2)
BUN BLD-MCNC: 35 MG/DL (ref 8–23)
BUN/CREAT SERPL: 12.4 (ref 7–25)
CALCIUM SPEC-SCNC: 8.7 MG/DL (ref 8.6–10.5)
CHLORIDE SERPL-SCNC: 106 MMOL/L (ref 98–107)
CO2 SERPL-SCNC: 22 MMOL/L (ref 22–29)
CREAT BLD-MCNC: 2.83 MG/DL (ref 0.57–1)
DEPRECATED RDW RBC AUTO: 53.1 FL (ref 37–54)
EOSINOPHIL # BLD AUTO: 0.22 10*3/MM3 (ref 0–0.4)
EOSINOPHIL NFR BLD AUTO: 2.2 % (ref 0.3–6.2)
ERYTHROCYTE [DISTWIDTH] IN BLOOD BY AUTOMATED COUNT: 15.6 % (ref 12.3–15.4)
GFR SERPL CREATININE-BSD FRML MDRD: 17 ML/MIN/1.73
GLOBULIN UR ELPH-MCNC: 3.2 GM/DL
GLUCOSE BLD-MCNC: 230 MG/DL (ref 65–99)
HCT VFR BLD AUTO: 33.5 % (ref 34–46.6)
HGB BLD-MCNC: 10.9 G/DL (ref 12–15.9)
HOLD SPECIMEN: NORMAL
HOLD SPECIMEN: NORMAL
IMM GRANULOCYTES # BLD AUTO: 0.06 10*3/MM3 (ref 0–0.05)
IMM GRANULOCYTES NFR BLD AUTO: 0.6 % (ref 0–0.5)
LYMPHOCYTES # BLD AUTO: 1.95 10*3/MM3 (ref 0.7–3.1)
LYMPHOCYTES NFR BLD AUTO: 19.6 % (ref 19.6–45.3)
MCH RBC QN AUTO: 30 PG (ref 26.6–33)
MCHC RBC AUTO-ENTMCNC: 32.5 G/DL (ref 31.5–35.7)
MCV RBC AUTO: 92.3 FL (ref 79–97)
MONOCYTES # BLD AUTO: 0.49 10*3/MM3 (ref 0.1–0.9)
MONOCYTES NFR BLD AUTO: 4.9 % (ref 5–12)
NEUTROPHILS # BLD AUTO: 7.2 10*3/MM3 (ref 1.7–7)
NEUTROPHILS NFR BLD AUTO: 72.3 % (ref 42.7–76)
NRBC BLD AUTO-RTO: 0 /100 WBC (ref 0–0.2)
PLATELET # BLD AUTO: 235 10*3/MM3 (ref 140–450)
PMV BLD AUTO: 10.2 FL (ref 6–12)
POTASSIUM BLD-SCNC: 3.9 MMOL/L (ref 3.5–5.2)
PROT SERPL-MCNC: 6.7 G/DL (ref 6–8.5)
RBC # BLD AUTO: 3.63 10*6/MM3 (ref 3.77–5.28)
SODIUM BLD-SCNC: 141 MMOL/L (ref 136–145)
WBC NRBC COR # BLD: 9.96 10*3/MM3 (ref 3.4–10.8)

## 2020-05-20 PROCEDURE — 99213 OFFICE O/P EST LOW 20 MIN: CPT | Performed by: INTERNAL MEDICINE

## 2020-05-20 PROCEDURE — 85025 COMPLETE CBC W/AUTO DIFF WBC: CPT

## 2020-05-20 PROCEDURE — 36415 COLL VENOUS BLD VENIPUNCTURE: CPT

## 2020-05-20 PROCEDURE — 80053 COMPREHEN METABOLIC PANEL: CPT

## 2020-05-20 PROCEDURE — 96372 THER/PROPH/DIAG INJ SC/IM: CPT

## 2020-05-20 PROCEDURE — 25010000002 EPOETIN ALFA-EPBX 40000 UNIT/ML SOLUTION: Performed by: INTERNAL MEDICINE

## 2020-05-20 RX ADMIN — EPOETIN ALFA-EPBX 40000 UNITS: 40000 INJECTION, SOLUTION INTRAVENOUS; SUBCUTANEOUS at 11:56

## 2020-05-20 NOTE — PROGRESS NOTES
11:43:  VTO: Dr. Huitron/Dorys R/T Hgb 10.9 and HCT 33.5 and CKD.  No written perimeters and normal CKD is HCT <33.  Spoke with Dr. Huitron and she wants to administer Procrit today regardless of HCT as goal is to keep Hgb >11.   RUDY Fung

## 2020-05-20 NOTE — TELEPHONE ENCOUNTER
Call from Anni GRANT to report that the parameters for Procrit are not on the order for the patient. She reports the patient is seen for CKD and her HCT is 33.5. I discussed with Dorys GRANT and Dr. Rodriguez and the patient is okay for Procrit injection.

## 2020-05-20 NOTE — PROGRESS NOTES
[facility]  HEMATOLOGY & ONCOLOGY    Cancer Staging Information:  Cancer Staging  No matching staging information was found for the patient.      Subjective     VISIT DIAGNOSIS:   Encounter Diagnosis   Name Primary?   • Anemia in stage 3 chronic kidney disease (CMS/HCC)        REASON FOR VISIT:     Chief Complaint   Patient presents with   • Anemia     HERE FOR F/U        HEMATOLOGY / ONCOLOGY HISTORY:    No history exists.           INTERVAL HISTORY  Patient ID: Maria C Shrestha is a 60 y.o. year old female Cancer Staging    12/3/19: she has a lot of family stressors that is making her depressed.    5/20/2020: No new issues.   - Denies sob,cp,n/v/dizziness.occassional blood streaked stool from hemorrhoids.  --rest of ros unremarkable. Physical exam unremarkable.    Past Medical History:   Past Medical History:   Diagnosis Date   • Acquired hypothyroidism 2/6/2017   • Allergic    • Anemia    • Anxiety    • Arthritis    • Cellulitis    • Chronic kidney disease     3rd stage   • Depression    • Disease of thyroid gland    • Dyslipidemia    • Elevated cholesterol    • Essential hypertension    • Fatigue    • Gallbladder abscess    • Hearing loss    • Light headed    • Limited range of motion (ROM) of shoulder     right   • Obesity    • Palpitation    • Short of breath on exertion    • Sleep apnea    • Type 2 diabetes mellitus (CMS/HCC)    • Type II diabetes mellitus, uncontrolled (CMS/HCC)    • Wears glasses      Past Surgical History:   Past Surgical History:   Procedure Laterality Date   • ADENOIDECTOMY     • ANAL FISTULA REPAIR      x 2    • CHOLECYSTECTOMY     • COLONOSCOPY  01/02/2014   • COLONOSCOPY N/A 3/22/2017    Procedure: COLONOSCOPY WITH ANESTHESIA;  Surgeon: Mono Linder MD;  Location: [facility] ENDOSCOPY;  Service:    • D&C HYSTEROSCOPY N/A 7/25/2018    Procedure: DILATATION AND CURETTAGE HYSTEROSCOPY;  Surgeon: Michael Castaneda MD;  Location: [facility] OR;  Service: Obstetrics/Gynecology  "  • DILATATION AND CURETTAGE      X 2   • ENDOSCOPY     • TONSILLECTOMY       Social History:   Social History     Socioeconomic History   • Marital status:      Spouse name: Not on file   • Number of children: Not on file   • Years of education: Not on file   • Highest education level: Not on file   Tobacco Use   • Smoking status: Never Smoker   • Smokeless tobacco: Never Used   Substance and Sexual Activity   • Alcohol use: No   • Drug use: No   • Sexual activity: Never     Birth control/protection: Post-menopausal     Family History:   Family History   Problem Relation Age of Onset   • Diabetes Other    • Cancer Mother    • Cancer Father    • Heart disease Father    • Diabetes Father    • Obesity Father    • Stroke Father    • Cancer Maternal Grandmother    • Uterine cancer Maternal Grandmother    • Diabetes Maternal Grandfather    • Cancer Paternal Grandmother    • Colon cancer Paternal Grandmother    • Diabetes Paternal Grandfather    • Heart disease Paternal Grandfather    • No Known Problems Sister    • No Known Problems Brother    • No Known Problems Daughter    • No Known Problems Son    • No Known Problems Maternal Aunt    • No Known Problems Paternal Aunt    • BRCA 1/2 Neg Hx    • Breast cancer Neg Hx    • Endometrial cancer Neg Hx    • Ovarian cancer Neg Hx        Review of Systems   See HPI otherwise unremarkable.  Performance Status:  Asymptomatic    Medications:    Current Outpatient Medications   Medication Sig Dispense Refill   • allopurinol (ZYLOPRIM) 100 MG tablet Take 100 mg by mouth 2 (Two) Times a Day.     • amLODIPine (NORVASC) 10 MG tablet Take 10 mg by mouth.     • aspirin 81 MG tablet Take 81 mg by mouth Daily. Stop 7/22/2018     • BD INSULIN SYRINGE U/F 31G X 5/16\" 1 ML misc AS DIRECTED FOUR TIMES A  each 0   • busPIRone (BUSPAR) 10 MG tablet buspirone 10 mg tablet   Take 2 tablets twice a day by oral route as needed for 90 days.     • carvedilol (COREG) 3.125 MG tablet Take " 6.25 mg by mouth 2 (Two) Times a Day.     • cetirizine (zyrTEC) 10 MG tablet Take 10 mg by mouth As Needed.     • Cholecalciferol (VITAMIN D3) 25638 units tablet Vitamin D2 50,000 unit capsule   Take 1 capsule every week by oral route. Sunday     • citalopram (CeleXA) 20 MG tablet Take 20 mg by mouth Daily.     • diazePAM (VALIUM) 2 MG tablet Take 2 mg by mouth As Needed for Anxiety.     • Dulaglutide (Trulicity) 1.5 MG/0.5ML solution pen-injector Inject 1.5 mg under the skin into the appropriate area as directed Every 7 (Seven) Days. 6 mL 3   • Ergocalciferol (VITAMIN D2 PO) Take 1,250 mcg by mouth 1 (One) Time Per Week. VITAMIN D2 1250 MCG (50,000 UNIT) CAPSULE TAKE 1 CAPSULE EVERY WEEK BY ORAL ROUTE     • Evolocumab 140 MG/ML solution auto-injector Inject 1 mL under the skin into the appropriate area as directed Every 14 (Fourteen) Days for 24 doses. NDC [address]-32 6 mL 10   • fluticasone (VERAMYST) 27.5 MCG/SPRAY nasal spray 2 sprays into each nostril Daily.     • Glucose Blood (BLOOD GLUCOSE TEST) strip Use 4 x daily, use any brand covered by insurance or same brand as before 360 each 3   • ibuprofen (ADVIL,MOTRIN) 800 MG tablet Take 800 mg by mouth Every 8 (Eight) Hours As Needed for Mild Pain .     • insulin glargine (Lantus) 100 UNIT/ML injection INJECT 200 UNITS DAILY 180 mL 3   • Insulin Lispro (HUMALOG KWIKPEN) 200 UNIT/ML solution pen-injector Inject 80 Units under the skin into the appropriate area as directed 3 (Three) Times a Day With Meals. 36 pen 11   • Insulin Pen Needle (PEN NEEDLES) 31G X 6 MM misc Use to inject insulin 4 times daily. 200 each 11   • lamoTRIgine (LaMICtal) 50 MG tablet dispersible disintegrating tablet Take 50 mg by mouth Every Night.     • Lancets (FREESTYLE) lancets FOUR TIMES A  each 11   • levothyroxine (Synthroid) 88 MCG tablet Take 1 tablet by mouth Daily. 30 tablet 11   • lisinopril (PRINIVIL,ZESTRIL) 40 MG tablet Take 40 mg by mouth 2 (Two) Times a Day.     •  "Melatonin 10 MG tablet Take  by mouth Every Night.     • Multiple Vitamins-Minerals (MULTIVITAMIN ADULT PO) Take 1 tablet by mouth Daily.     • Omega-3 Fatty Acids (FISH OIL) 1000 MG capsule capsule Take 2,000 mg by mouth Daily With Dinner.     • potassium gluconate 595 (99 K) MG tablet tablet potassium gluconate 595 mg (99 mg) tablet   Take 1 tablets every day by oral route.     • rosuvastatin (CRESTOR) 10 MG tablet Take 10 mg by mouth Daily.     • sodium bicarbonate 650 MG tablet Take 650 mg by mouth 4 (Four) Times a Day.     • traMADol (ULTRAM) 50 MG tablet Take 1 tablet by mouth Every 6 (Six) Hours As Needed for Moderate Pain . 8 tablet 0     No current facility-administered medications for this visit.        ALLERGIES:    Allergies   Allergen Reactions   • Codeine Nausea Only       Objective      Vitals:    05/20/20 1045   BP: 148/72   Pulse: 70   Resp: 16   Temp: 97.6 °F (36.4 °C)   TempSrc: Temporal   SpO2: 97%   Weight: (!) 181 kg (398 lb)   Height: 165.1 cm (65\")   PainSc:   4   PainLoc: Neck         Current Status 5/20/2020   ECOG score 1         Physical Examremains the same  General Appearance:obese female.  Patient is awake, alert, oriented and in no acute distress. Patient is welldeveloped, wellnourished, and appears stated age.  HEENT: Normocephalic. Sclerae clear, conjunctiva pink, extraocular movements intact, pupils, round, reactive to light and  accommodation. Mouth and throat are clear with moist oral mucosa.  NECK: Supple, no jugular venous distention, thyroid not enlarged.  LYMPH: No cervical, supraclavicular, axillary, or inguinal lymphadenopathy.  CHEST: Equal bilateral expansion, AP  diameter normal, resonant percussion note  LUNGS: Good air movement, no rales, rhonchi, rubs or wheezes with auscultation  CARDIO: Regular sinus rhythm, no murmurs, gallops or rubs.  ABDOMEN: obese abdomen. Nondistended, soft, No tenderness, no guarding, no rebound, No hepatosplenomegaly. No abdominal masses. " Bowel sounds positive. No hernia  GENITALIA: Not examined.  BREASTS: Not examined.  MUSKEL: No joint swelling, decreased motion, or inflammation  EXTREMS: kristie YAZAN. No clubbing, cyanosis, No varicose veins.  NEURO: Grossly nonfocal, Gait is coordinated and smooth, Cognition is preserved.  SKIN: No rashes, no ecchymoses, no petechia.  PSYCH: Oriented to time, place and person. Memory is preserved. Mood and affect appear normal  RECENT LABS:  Lab on 05/20/2020   Component Date Value Ref Range Status   • WBC 05/20/2020 9.96  3.40 - 10.80 10*3/mm3 Final   • RBC 05/20/2020 3.63* 3.77 - 5.28 10*6/mm3 Final   • Hemoglobin 05/20/2020 10.9* 12.0 - 15.9 g/dL Final   • Hematocrit 05/20/2020 33.5* 34.0 - 46.6 % Final   • MCV 05/20/2020 92.3  79.0 - 97.0 fL Final   • MCH 05/20/2020 30.0  26.6 - 33.0 pg Final   • MCHC 05/20/2020 32.5  31.5 - 35.7 g/dL Final   • RDW 05/20/2020 15.6* 12.3 - 15.4 % Final   • RDW-SD 05/20/2020 53.1  37.0 - 54.0 fl Final   • MPV 05/20/2020 10.2  6.0 - 12.0 fL Final   • Platelets 05/20/2020 235  140 - 450 10*3/mm3 Final   • Neutrophil % 05/20/2020 72.3  42.7 - 76.0 % Final   • Lymphocyte % 05/20/2020 19.6  19.6 - 45.3 % Final   • Monocyte % 05/20/2020 4.9* 5.0 - 12.0 % Final   • Eosinophil % 05/20/2020 2.2  0.3 - 6.2 % Final   • Basophil % 05/20/2020 0.4  0.0 - 1.5 % Final   • Immature Grans % 05/20/2020 0.6* 0.0 - 0.5 % Final   • Neutrophils, Absolute 05/20/2020 7.20* 1.70 - 7.00 10*3/mm3 Final   • Lymphocytes, Absolute 05/20/2020 1.95  0.70 - 3.10 10*3/mm3 Final   • Monocytes, Absolute 05/20/2020 0.49  0.10 - 0.90 10*3/mm3 Final   • Eosinophils, Absolute 05/20/2020 0.22  0.00 - 0.40 10*3/mm3 Final   • Basophils, Absolute 05/20/2020 0.04  0.00 - 0.20 10*3/mm3 Final   • Immature Grans, Absolute 05/20/2020 0.06* 0.00 - 0.05 10*3/mm3 Final   • nRBC 05/20/2020 0.0  0.0 - 0.2 /100 WBC Final       RADIOLOGY:  No results found.         Assessment/Plan  Maria C Shrestha is a 60 y.o. year old female  Whom we are  seeing for AoCKD on procrit that is stable.    Patient Active Problem List   Diagnosis   • Type 2 diabetes mellitus with stage 3 chronic kidney disease, with long-term current use of insulin (CMS/HCC)   • Mixed diabetic hyperlipidemia associated with type 2 diabetes mellitus (CMS/HCC)   • Hypertension associated with diabetes (CMS/HCC)   • Vitamin D deficiency   • B12 deficiency   • Anemia in CKD (chronic kidney disease)   • Acquired hypothyroidism   • Chronic kidney disease, stage 4 (severe) (CMS/HCC)   • Anemia   • Anxiety   • Deep venous thrombosis of lower extremity (CMS/HCC)   • Disease of liver   • Embolism (CMS/HCC)   • Hypertriglyceridemia   • Hypocalcemia   • Mood disorder (CMS/HCC)   • Morbid obesity (CMS/HCC)   • Sleep apnea   • Acute renal failure superimposed on stage 3 chronic kidney disease (CMS/HCC)   • Anemia of chronic renal failure   • Hypertension   • Diabetes mellitus (CMS/HCC)   • Thyroid disease   • Hypothyroidism   • Type 2 DM with CKD stage 3 and hypertension (CMS/HCC)   • Vitamin D deficiency          1. AoCKD: s/p iron infusions. Iron 72, %sat 25.  - Labs reviewed with the pt cr 2.83. Wbc 9.96,  Hg 10.9, plt 235  - Ok for procrit shot today. Goal Hg is >10 and <12.        2. HTN: on amlodipine, carvedilol, lisinopril    3.DM: on trulicity    4.Hypothyroidism: on synthroid  5. Anxiety/depression: on diazepam, celexa  6. Hyperlipidemia: on crestor.  7. CKD stage 4: sees renal. grf 17   pt cr 2.83. Advised increased fluid intake daily,           Rodrigo Huitron MD    5/20/2020    11:15

## 2020-06-17 ENCOUNTER — OFFICE VISIT (OUTPATIENT)
Dept: ONCOLOGY | Facility: CLINIC | Age: 60
End: 2020-06-17

## 2020-06-17 ENCOUNTER — INFUSION (OUTPATIENT)
Dept: ONCOLOGY | Facility: HOSPITAL | Age: 60
End: 2020-06-17

## 2020-06-17 ENCOUNTER — LAB (OUTPATIENT)
Dept: LAB | Facility: HOSPITAL | Age: 60
End: 2020-06-17

## 2020-06-17 VITALS
HEIGHT: 65 IN | HEART RATE: 70 BPM | SYSTOLIC BLOOD PRESSURE: 160 MMHG | DIASTOLIC BLOOD PRESSURE: 80 MMHG | TEMPERATURE: 97.1 F | OXYGEN SATURATION: 98 % | BODY MASS INDEX: 48.82 KG/M2 | RESPIRATION RATE: 18 BRPM | WEIGHT: 293 LBS

## 2020-06-17 VITALS
RESPIRATION RATE: 19 BRPM | SYSTOLIC BLOOD PRESSURE: 149 MMHG | OXYGEN SATURATION: 100 % | HEIGHT: 66 IN | WEIGHT: 293 LBS | BODY MASS INDEX: 47.09 KG/M2 | DIASTOLIC BLOOD PRESSURE: 53 MMHG | HEART RATE: 65 BPM | TEMPERATURE: 97 F

## 2020-06-17 DIAGNOSIS — N18.30 ANEMIA IN STAGE 3 CHRONIC KIDNEY DISEASE (HCC): Primary | ICD-10-CM

## 2020-06-17 DIAGNOSIS — D63.1 ANEMIA IN STAGE 3 CHRONIC KIDNEY DISEASE (HCC): Primary | ICD-10-CM

## 2020-06-17 LAB
ALBUMIN SERPL-MCNC: 3.6 G/DL (ref 3.5–5.2)
ALBUMIN/GLOB SERPL: 1.1 G/DL
ALP SERPL-CCNC: 63 U/L (ref 39–117)
ALT SERPL W P-5'-P-CCNC: 9 U/L (ref 1–33)
ANION GAP SERPL CALCULATED.3IONS-SCNC: 12 MMOL/L (ref 5–15)
AST SERPL-CCNC: 12 U/L (ref 1–32)
BASOPHILS # BLD AUTO: 0.03 10*3/MM3 (ref 0–0.2)
BASOPHILS NFR BLD AUTO: 0.3 % (ref 0–1.5)
BILIRUB SERPL-MCNC: 0.2 MG/DL (ref 0.2–1.2)
BUN BLD-MCNC: 33 MG/DL (ref 8–23)
BUN/CREAT SERPL: 12.5 (ref 7–25)
CALCIUM SPEC-SCNC: 9.2 MG/DL (ref 8.6–10.5)
CHLORIDE SERPL-SCNC: 106 MMOL/L (ref 98–107)
CO2 SERPL-SCNC: 24 MMOL/L (ref 22–29)
CREAT BLD-MCNC: 2.64 MG/DL (ref 0.57–1)
DEPRECATED RDW RBC AUTO: 54.3 FL (ref 37–54)
EOSINOPHIL # BLD AUTO: 0.16 10*3/MM3 (ref 0–0.4)
EOSINOPHIL NFR BLD AUTO: 1.9 % (ref 0.3–6.2)
ERYTHROCYTE [DISTWIDTH] IN BLOOD BY AUTOMATED COUNT: 15.9 % (ref 12.3–15.4)
GFR SERPL CREATININE-BSD FRML MDRD: 18 ML/MIN/1.73
GLOBULIN UR ELPH-MCNC: 3.3 GM/DL
GLUCOSE BLD-MCNC: 158 MG/DL (ref 65–99)
HCT VFR BLD AUTO: 31.2 % (ref 34–46.6)
HGB BLD-MCNC: 10.1 G/DL (ref 12–15.9)
HOLD SPECIMEN: NORMAL
IMM GRANULOCYTES # BLD AUTO: 0.03 10*3/MM3 (ref 0–0.05)
IMM GRANULOCYTES NFR BLD AUTO: 0.3 % (ref 0–0.5)
LYMPHOCYTES # BLD AUTO: 1.71 10*3/MM3 (ref 0.7–3.1)
LYMPHOCYTES NFR BLD AUTO: 19.8 % (ref 19.6–45.3)
MCH RBC QN AUTO: 30.2 PG (ref 26.6–33)
MCHC RBC AUTO-ENTMCNC: 32.4 G/DL (ref 31.5–35.7)
MCV RBC AUTO: 93.4 FL (ref 79–97)
MONOCYTES # BLD AUTO: 0.44 10*3/MM3 (ref 0.1–0.9)
MONOCYTES NFR BLD AUTO: 5.1 % (ref 5–12)
NEUTROPHILS # BLD AUTO: 6.27 10*3/MM3 (ref 1.7–7)
NEUTROPHILS NFR BLD AUTO: 72.6 % (ref 42.7–76)
NRBC BLD AUTO-RTO: 0 /100 WBC (ref 0–0.2)
PLATELET # BLD AUTO: 229 10*3/MM3 (ref 140–450)
PMV BLD AUTO: 10.3 FL (ref 6–12)
POTASSIUM BLD-SCNC: 4.3 MMOL/L (ref 3.5–5.2)
PROT SERPL-MCNC: 6.9 G/DL (ref 6–8.5)
RBC # BLD AUTO: 3.34 10*6/MM3 (ref 3.77–5.28)
SODIUM BLD-SCNC: 142 MMOL/L (ref 136–145)
WBC NRBC COR # BLD: 8.64 10*3/MM3 (ref 3.4–10.8)

## 2020-06-17 PROCEDURE — 80053 COMPREHEN METABOLIC PANEL: CPT

## 2020-06-17 PROCEDURE — 36415 COLL VENOUS BLD VENIPUNCTURE: CPT

## 2020-06-17 PROCEDURE — 99213 OFFICE O/P EST LOW 20 MIN: CPT | Performed by: INTERNAL MEDICINE

## 2020-06-17 PROCEDURE — 96372 THER/PROPH/DIAG INJ SC/IM: CPT

## 2020-06-17 PROCEDURE — 85025 COMPLETE CBC W/AUTO DIFF WBC: CPT

## 2020-06-17 PROCEDURE — 25010000002 EPOETIN ALFA-EPBX 40000 UNIT/ML SOLUTION: Performed by: INTERNAL MEDICINE

## 2020-06-17 RX ADMIN — EPOETIN ALFA-EPBX 40000 UNITS: 40000 INJECTION, SOLUTION INTRAVENOUS; SUBCUTANEOUS at 13:51

## 2020-06-17 NOTE — PROGRESS NOTES
[facility]  HEMATOLOGY & ONCOLOGY    Cancer Staging Information:  Cancer Staging  No matching staging information was found for the patient.      Subjective     VISIT DIAGNOSIS:   No diagnosis found.    REASON FOR VISIT:     No chief complaint on file.       HEMATOLOGY / ONCOLOGY HISTORY:    No history exists.           INTERVAL HISTORY  Patient ID: Maria C Shrestha is a 60 y.o. year old female Cancer Staging    12/3/19: she has a lot of family stressors that is making her depressed.    6/17/2020: No new issues.   - Denies sob,cp,n/v/dizziness.occassional blood streaked stool from hemorrhoids.  --rest of ros unremarkable. Physical exam unremarkable.    Past Medical History:   Past Medical History:   Diagnosis Date   • Acquired hypothyroidism 2/6/2017   • Allergic    • Anemia    • Anxiety    • Arthritis    • Cellulitis    • Chronic kidney disease     3rd stage   • Depression    • Disease of thyroid gland    • Dyslipidemia    • Elevated cholesterol    • Essential hypertension    • Fatigue    • Gallbladder abscess    • Hearing loss    • Light headed    • Limited range of motion (ROM) of shoulder     right   • Obesity    • Palpitation    • Short of breath on exertion    • Sleep apnea    • Type 2 diabetes mellitus (CMS/HCC)    • Type II diabetes mellitus, uncontrolled (CMS/HCC)    • Wears glasses      Past Surgical History:   Past Surgical History:   Procedure Laterality Date   • ADENOIDECTOMY     • ANAL FISTULA REPAIR      x 2    • CHOLECYSTECTOMY     • COLONOSCOPY  01/02/2014   • COLONOSCOPY N/A 3/22/2017    Procedure: COLONOSCOPY WITH ANESTHESIA;  Surgeon: Mono Linder MD;  Location: [facility] ENDOSCOPY;  Service:    • D&C HYSTEROSCOPY N/A 7/25/2018    Procedure: DILATATION AND CURETTAGE HYSTEROSCOPY;  Surgeon: Michael Castaneda MD;  Location: [facility] OR;  Service: Obstetrics/Gynecology   • DILATATION AND CURETTAGE      X 2   • ENDOSCOPY     • TONSILLECTOMY       Social History:   Social History  "    Socioeconomic History   • Marital status:      Spouse name: Not on file   • Number of children: Not on file   • Years of education: Not on file   • Highest education level: Not on file   Tobacco Use   • Smoking status: Never Smoker   • Smokeless tobacco: Never Used   Substance and Sexual Activity   • Alcohol use: No   • Drug use: No   • Sexual activity: Never     Birth control/protection: Post-menopausal     Family History:   Family History   Problem Relation Age of Onset   • Diabetes Other    • Cancer Mother    • Cancer Father    • Heart disease Father    • Diabetes Father    • Obesity Father    • Stroke Father    • Cancer Maternal Grandmother    • Uterine cancer Maternal Grandmother    • Diabetes Maternal Grandfather    • Cancer Paternal Grandmother    • Colon cancer Paternal Grandmother    • Diabetes Paternal Grandfather    • Heart disease Paternal Grandfather    • No Known Problems Sister    • No Known Problems Brother    • No Known Problems Daughter    • No Known Problems Son    • No Known Problems Maternal Aunt    • No Known Problems Paternal Aunt    • BRCA 1/2 Neg Hx    • Breast cancer Neg Hx    • Endometrial cancer Neg Hx    • Ovarian cancer Neg Hx        Review of Systems   See HPI otherwise unremarkable.  Performance Status:  Asymptomatic    Medications:    Current Outpatient Medications   Medication Sig Dispense Refill   • allopurinol (ZYLOPRIM) 100 MG tablet Take 100 mg by mouth 2 (Two) Times a Day.     • amLODIPine (NORVASC) 10 MG tablet Take 10 mg by mouth.     • aspirin 81 MG tablet Take 81 mg by mouth Daily. Stop 7/22/2018     • BD INSULIN SYRINGE U/F 31G X 5/16\" 1 ML misc AS DIRECTED FOUR TIMES A  each 0   • busPIRone (BUSPAR) 10 MG tablet buspirone 10 mg tablet   Take 2 tablets twice a day by oral route as needed for 90 days.     • carvedilol (COREG) 3.125 MG tablet Take 6.25 mg by mouth 2 (Two) Times a Day.     • cetirizine (zyrTEC) 10 MG tablet Take 10 mg by mouth As Needed.   "   • Cholecalciferol (VITAMIN D3) 53032 units tablet Vitamin D2 50,000 unit capsule   Take 1 capsule every week by oral route. Sunday     • citalopram (CeleXA) 20 MG tablet Take 20 mg by mouth Daily.     • diazePAM (VALIUM) 2 MG tablet Take 2 mg by mouth As Needed for Anxiety.     • Dulaglutide (Trulicity) 1.5 MG/0.5ML solution pen-injector Inject 1.5 mg under the skin into the appropriate area as directed Every 7 (Seven) Days. 6 mL 3   • Ergocalciferol (VITAMIN D2 PO) Take 1,250 mcg by mouth 1 (One) Time Per Week. VITAMIN D2 1250 MCG (50,000 UNIT) CAPSULE TAKE 1 CAPSULE EVERY WEEK BY ORAL ROUTE     • Evolocumab 140 MG/ML solution auto-injector Inject 1 mL under the skin into the appropriate area as directed Every 14 (Fourteen) Days for 24 doses. NDC [address]-49 6 mL 10   • fluticasone (VERAMYST) 27.5 MCG/SPRAY nasal spray 2 sprays into each nostril Daily.     • Glucose Blood (BLOOD GLUCOSE TEST) strip Use 4 x daily, use any brand covered by insurance or same brand as before 360 each 3   • ibuprofen (ADVIL,MOTRIN) 800 MG tablet Take 800 mg by mouth Every 8 (Eight) Hours As Needed for Mild Pain .     • insulin glargine (Lantus) 100 UNIT/ML injection INJECT 200 UNITS DAILY 180 mL 3   • Insulin Lispro (HUMALOG KWIKPEN) 200 UNIT/ML solution pen-injector Inject 80 Units under the skin into the appropriate area as directed 3 (Three) Times a Day With Meals. 36 pen 11   • Insulin Pen Needle (PEN NEEDLES) 31G X 6 MM misc Use to inject insulin 4 times daily. 200 each 11   • lamoTRIgine (LaMICtal) 50 MG tablet dispersible disintegrating tablet Take 50 mg by mouth Every Night.     • Lancets (FREESTYLE) lancets FOUR TIMES A  each 11   • levothyroxine (Synthroid) 88 MCG tablet Take 1 tablet by mouth Daily. 30 tablet 11   • lisinopril (PRINIVIL,ZESTRIL) 40 MG tablet Take 40 mg by mouth 2 (Two) Times a Day.     • Melatonin 10 MG tablet Take  by mouth Every Night.     • Multiple Vitamins-Minerals (MULTIVITAMIN ADULT PO) Take  "1 tablet by mouth Daily.     • Omega-3 Fatty Acids (FISH OIL) 1000 MG capsule capsule Take 2,000 mg by mouth Daily With Dinner.     • potassium gluconate 595 (99 K) MG tablet tablet potassium gluconate 595 mg (99 mg) tablet   Take 1 tablets every day by oral route.     • rosuvastatin (CRESTOR) 10 MG tablet Take 10 mg by mouth Daily.     • sodium bicarbonate 650 MG tablet Take 650 mg by mouth 4 (Four) Times a Day.     • traMADol (ULTRAM) 50 MG tablet Take 1 tablet by mouth Every 6 (Six) Hours As Needed for Moderate Pain . 8 tablet 0     No current facility-administered medications for this visit.        ALLERGIES:    Allergies   Allergen Reactions   • Codeine Nausea Only       Objective      Vitals:    06/17/20 1311   Resp: 18   Weight: (!) 174 kg (384 lb)   Height: 165.1 cm (65\")         Current Status 6/17/2020   ECOG score 1         Physical Examremains the same  General Appearance:obese female.  Patient is awake, alert, oriented and in no acute distress. Patient is welldeveloped, wellnourished, and appears stated age.  HEENT: Normocephalic. Sclerae clear, conjunctiva pink, extraocular movements intact, pupils, round, reactive to light and  accommodation. Mouth and throat are clear with moist oral mucosa.  NECK: Supple, no jugular venous distention, thyroid not enlarged.  LYMPH: No cervical, supraclavicular, axillary, or inguinal lymphadenopathy.  CHEST: Equal bilateral expansion, AP  diameter normal, resonant percussion note  LUNGS: Good air movement, no rales, rhonchi, rubs or wheezes with auscultation  CARDIO: Regular sinus rhythm, no murmurs, gallops or rubs.  ABDOMEN: obese abdomen. Nondistended, soft, No tenderness, no guarding, no rebound, No hepatosplenomegaly. No abdominal masses. Bowel sounds positive. No hernia  GENITALIA: Not examined.  BREASTS: Not examined.  MUSKEL: No joint swelling, decreased motion, or inflammation  EXTREMS: kristie YAZAN. No clubbing, cyanosis, No varicose veins.  NEURO: Grossly " nonfocal, Gait is coordinated and smooth, Cognition is preserved.  SKIN: No rashes, no ecchymoses, no petechia.  PSYCH: Oriented to time, place and person. Memory is preserved. Mood and affect appear normal  RECENT LABS:  Lab on 06/17/2020   Component Date Value Ref Range Status   • WBC 06/17/2020 8.64  3.40 - 10.80 10*3/mm3 Final   • RBC 06/17/2020 3.34* 3.77 - 5.28 10*6/mm3 Final   • Hemoglobin 06/17/2020 10.1* 12.0 - 15.9 g/dL Final   • Hematocrit 06/17/2020 31.2* 34.0 - 46.6 % Final   • MCV 06/17/2020 93.4  79.0 - 97.0 fL Final   • MCH 06/17/2020 30.2  26.6 - 33.0 pg Final   • MCHC 06/17/2020 32.4  31.5 - 35.7 g/dL Final   • RDW 06/17/2020 15.9* 12.3 - 15.4 % Final   • RDW-SD 06/17/2020 54.3* 37.0 - 54.0 fl Final   • MPV 06/17/2020 10.3  6.0 - 12.0 fL Final   • Platelets 06/17/2020 229  140 - 450 10*3/mm3 Final   • Neutrophil % 06/17/2020 72.6  42.7 - 76.0 % Final   • Lymphocyte % 06/17/2020 19.8  19.6 - 45.3 % Final   • Monocyte % 06/17/2020 5.1  5.0 - 12.0 % Final   • Eosinophil % 06/17/2020 1.9  0.3 - 6.2 % Final   • Basophil % 06/17/2020 0.3  0.0 - 1.5 % Final   • Immature Grans % 06/17/2020 0.3  0.0 - 0.5 % Final   • Neutrophils, Absolute 06/17/2020 6.27  1.70 - 7.00 10*3/mm3 Final   • Lymphocytes, Absolute 06/17/2020 1.71  0.70 - 3.10 10*3/mm3 Final   • Monocytes, Absolute 06/17/2020 0.44  0.10 - 0.90 10*3/mm3 Final   • Eosinophils, Absolute 06/17/2020 0.16  0.00 - 0.40 10*3/mm3 Final   • Basophils, Absolute 06/17/2020 0.03  0.00 - 0.20 10*3/mm3 Final   • Immature Grans, Absolute 06/17/2020 0.03  0.00 - 0.05 10*3/mm3 Final   • nRBC 06/17/2020 0.0  0.0 - 0.2 /100 WBC Final       RADIOLOGY:  No results found.         Assessment/Plan  Maria C Shrestha is a 60 y.o. year old female  Whom we are seeing for AoCKD on procrit that is stable.    Patient Active Problem List   Diagnosis   • Type 2 diabetes mellitus with stage 3 chronic kidney disease, with long-term current use of insulin (CMS/[facility])   • Mixed diabetic  hyperlipidemia associated with type 2 diabetes mellitus (CMS/HCC)   • Hypertension associated with diabetes (CMS/HCC)   • Vitamin D deficiency   • B12 deficiency   • Anemia in CKD (chronic kidney disease)   • Acquired hypothyroidism   • Chronic kidney disease, stage 4 (severe) (CMS/HCC)   • Anemia   • Anxiety   • Deep venous thrombosis of lower extremity (CMS/HCC)   • Disease of liver   • Embolism (CMS/HCC)   • Hypertriglyceridemia   • Hypocalcemia   • Mood disorder (CMS/HCC)   • Morbid obesity (CMS/HCC)   • Sleep apnea   • Acute renal failure superimposed on stage 3 chronic kidney disease (CMS/HCC)   • Anemia of chronic renal failure   • Hypertension   • Diabetes mellitus (CMS/HCC)   • Thyroid disease   • Hypothyroidism   • Type 2 DM with CKD stage 3 and hypertension (CMS/HCC)   • Vitamin D deficiency          1. AoCKD: s/p iron infusions. Iron 72, %sat 25.  - Labs reviewed with the pt cr 2.83. Wbc 8.64,  Hg 10.1, plt 229  - Ok for procrit shot today. Goal Hg is >10 and <12.        2. HTN: on amlodipine, carvedilol, lisinopril    3.DM: on trulicity    4.Hypothyroidism: on synthroid  5. Anxiety/depression: on diazepam, celexa  6. Hyperlipidemia: on crestor.  7. CKD stage 4: sees renal. grf 17-20   pt cr 2.64. Advised increased fluid intake daily,           Rodrigo Huitron MD    6/17/2020    13:16

## 2020-07-13 RX ORDER — LEVOTHYROXINE SODIUM 0.07 MG/1
TABLET ORAL
Qty: 90 TABLET | Refills: 1 | OUTPATIENT
Start: 2020-07-13

## 2020-07-15 ENCOUNTER — TELEPHONE (OUTPATIENT)
Dept: ONCOLOGY | Facility: CLINIC | Age: 60
End: 2020-07-15

## 2020-07-15 ENCOUNTER — OFFICE VISIT (OUTPATIENT)
Dept: ONCOLOGY | Facility: CLINIC | Age: 60
End: 2020-07-15

## 2020-07-15 ENCOUNTER — LAB (OUTPATIENT)
Dept: LAB | Facility: HOSPITAL | Age: 60
End: 2020-07-15

## 2020-07-15 ENCOUNTER — INFUSION (OUTPATIENT)
Dept: ONCOLOGY | Facility: HOSPITAL | Age: 60
End: 2020-07-15

## 2020-07-15 VITALS
HEART RATE: 73 BPM | WEIGHT: 293 LBS | RESPIRATION RATE: 18 BRPM | HEIGHT: 66 IN | OXYGEN SATURATION: 98 % | DIASTOLIC BLOOD PRESSURE: 70 MMHG | SYSTOLIC BLOOD PRESSURE: 150 MMHG | BODY MASS INDEX: 47.09 KG/M2 | TEMPERATURE: 97.6 F

## 2020-07-15 VITALS
WEIGHT: 293 LBS | BODY MASS INDEX: 48.82 KG/M2 | RESPIRATION RATE: 18 BRPM | OXYGEN SATURATION: 100 % | DIASTOLIC BLOOD PRESSURE: 70 MMHG | HEIGHT: 65 IN | TEMPERATURE: 97.6 F | SYSTOLIC BLOOD PRESSURE: 150 MMHG | HEART RATE: 73 BPM

## 2020-07-15 DIAGNOSIS — N18.4 CHRONIC KIDNEY DISEASE, STAGE 4 (SEVERE) (HCC): ICD-10-CM

## 2020-07-15 DIAGNOSIS — D63.1 ANEMIA IN STAGE 3 CHRONIC KIDNEY DISEASE (HCC): Primary | ICD-10-CM

## 2020-07-15 DIAGNOSIS — N18.30 ANEMIA IN STAGE 3 CHRONIC KIDNEY DISEASE (HCC): Primary | ICD-10-CM

## 2020-07-15 DIAGNOSIS — D63.1 ANEMIA IN STAGE 4 CHRONIC KIDNEY DISEASE (HCC): ICD-10-CM

## 2020-07-15 DIAGNOSIS — N18.4 ANEMIA IN STAGE 4 CHRONIC KIDNEY DISEASE (HCC): ICD-10-CM

## 2020-07-15 LAB
25(OH)D3 SERPL-MCNC: 42.5 NG/ML (ref 30–100)
ALBUMIN SERPL-MCNC: 3.6 G/DL (ref 3.5–5.2)
ALBUMIN SERPL-MCNC: 3.8 G/DL (ref 3.5–5)
ALBUMIN UR-MCNC: 199.8 MG/DL
ALBUMIN/GLOB SERPL: 1.2 G/DL
ALBUMIN/GLOB SERPL: 1.2 G/DL (ref 1.1–2.5)
ALP SERPL-CCNC: 62 U/L (ref 39–117)
ALP SERPL-CCNC: 67 U/L (ref 24–120)
ALT SERPL W P-5'-P-CCNC: 10 U/L (ref 1–33)
ALT SERPL W P-5'-P-CCNC: 15 U/L (ref 0–35)
ANION GAP SERPL CALCULATED.3IONS-SCNC: 12 MMOL/L (ref 5–15)
ANION GAP SERPL CALCULATED.3IONS-SCNC: 9 MMOL/L (ref 4–13)
AST SERPL-CCNC: 14 U/L (ref 1–32)
AST SERPL-CCNC: 20 U/L (ref 7–45)
AUTO MIXED CELLS #: 0.5 10*3/MM3 (ref 0.1–2.6)
AUTO MIXED CELLS %: 5.1 % (ref 0.1–24)
BASOPHILS # BLD AUTO: 0.05 10*3/MM3 (ref 0–0.2)
BASOPHILS NFR BLD AUTO: 0.5 % (ref 0–1.5)
BILIRUB SERPL-MCNC: 0.2 MG/DL (ref 0.1–1)
BILIRUB SERPL-MCNC: 0.2 MG/DL (ref 0–1.2)
BUN SERPL-MCNC: 38 MG/DL (ref 5–21)
BUN SERPL-MCNC: 40 MG/DL (ref 8–23)
BUN/CREAT SERPL: 12.8
BUN/CREAT SERPL: 13.7 (ref 7–25)
CALCIUM SPEC-SCNC: 9.2 MG/DL (ref 8.6–10.5)
CALCIUM SPEC-SCNC: 9.4 MG/DL (ref 8.4–10.4)
CHLORIDE SERPL-SCNC: 105 MMOL/L (ref 98–107)
CHLORIDE SERPL-SCNC: 105 MMOL/L (ref 98–110)
CHOLEST SERPL-MCNC: 121 MG/DL (ref 130–200)
CO2 SERPL-SCNC: 22 MMOL/L (ref 22–29)
CO2 SERPL-SCNC: 24 MMOL/L (ref 24–31)
CREAT SERPL-MCNC: 2.93 MG/DL (ref 0.57–1)
CREAT SERPL-MCNC: 2.98 MG/DL (ref 0.5–1.4)
CREAT UR-MCNC: 54.9 MG/DL
DEPRECATED RDW RBC AUTO: 54.6 FL (ref 37–54)
EOSINOPHIL # BLD AUTO: 0.21 10*3/MM3 (ref 0–0.4)
EOSINOPHIL NFR BLD AUTO: 2.1 % (ref 0.3–6.2)
ERYTHROCYTE [DISTWIDTH] IN BLOOD BY AUTOMATED COUNT: 15.9 % (ref 12.3–15.4)
ERYTHROCYTE [DISTWIDTH] IN BLOOD BY AUTOMATED COUNT: 16 % (ref 12.3–15.4)
FERRITIN SERPL-MCNC: 135.7 NG/ML (ref 13–150)
GFR SERPL CREATININE-BSD FRML MDRD: 16 ML/MIN/1.73
GFR SERPL CREATININE-BSD FRML MDRD: 16 ML/MIN/1.73
GLOBULIN UR ELPH-MCNC: 3.1 GM/DL
GLOBULIN UR ELPH-MCNC: 3.3 GM/DL
GLUCOSE SERPL-MCNC: 163 MG/DL (ref 70–100)
GLUCOSE SERPL-MCNC: 190 MG/DL (ref 65–99)
HBA1C MFR BLD: 7 % (ref 4.8–5.9)
HCT VFR BLD AUTO: 30.5 % (ref 34–46.6)
HCT VFR BLD AUTO: 32.3 % (ref 34–46.6)
HDLC SERPL-MCNC: 36 MG/DL
HGB BLD-MCNC: 10 G/DL (ref 12–15.9)
HGB BLD-MCNC: 10.7 G/DL (ref 12–15.9)
HOLD SPECIMEN: NORMAL
HOLD SPECIMEN: NORMAL
IMM GRANULOCYTES # BLD AUTO: 0.05 10*3/MM3 (ref 0–0.05)
IMM GRANULOCYTES NFR BLD AUTO: 0.5 % (ref 0–0.5)
IRON 24H UR-MRATE: 56 MCG/DL (ref 37–145)
IRON SATN MFR SERPL: 18 % (ref 20–50)
LDLC SERPL CALC-MCNC: 17 MG/DL (ref 0–99)
LDLC/HDLC SERPL: 0.48 {RATIO}
LYMPHOCYTES # BLD AUTO: 1.8 10*3/MM3 (ref 0.7–3.1)
LYMPHOCYTES # BLD AUTO: 1.81 10*3/MM3 (ref 0.7–3.1)
LYMPHOCYTES NFR BLD AUTO: 17.7 % (ref 19.6–45.3)
LYMPHOCYTES NFR BLD AUTO: 17.8 % (ref 19.6–45.3)
MCH RBC QN AUTO: 30.4 PG (ref 26.6–33)
MCH RBC QN AUTO: 30.5 PG (ref 26.6–33)
MCHC RBC AUTO-ENTMCNC: 32.8 G/DL (ref 31.5–35.7)
MCHC RBC AUTO-ENTMCNC: 33.1 G/DL (ref 31.5–35.7)
MCV RBC AUTO: 91.8 FL (ref 79–97)
MCV RBC AUTO: 93 FL (ref 79–97)
MICROALBUMIN/CREAT UR: 3639.3 MG/G
MONOCYTES # BLD AUTO: 0.48 10*3/MM3 (ref 0.1–0.9)
MONOCYTES NFR BLD AUTO: 4.7 % (ref 5–12)
NEUTROPHILS NFR BLD AUTO: 7.55 10*3/MM3 (ref 1.7–7)
NEUTROPHILS NFR BLD AUTO: 7.9 10*3/MM3 (ref 1.7–7)
NEUTROPHILS NFR BLD AUTO: 74.4 % (ref 42.7–76)
NEUTROPHILS NFR BLD AUTO: 77.2 % (ref 42.7–76)
NRBC BLD AUTO-RTO: 0 /100 WBC (ref 0–0.2)
PLATELET # BLD AUTO: 218 10*3/MM3 (ref 140–450)
PLATELET # BLD AUTO: 230 10*3/MM3 (ref 140–450)
PMV BLD AUTO: 10.3 FL (ref 6–12)
PMV BLD AUTO: 9.2 FL (ref 6–12)
POTASSIUM SERPL-SCNC: 4.2 MMOL/L (ref 3.5–5.3)
POTASSIUM SERPL-SCNC: 4.4 MMOL/L (ref 3.5–5.2)
PROT SERPL-MCNC: 6.7 G/DL (ref 6–8.5)
PROT SERPL-MCNC: 7.1 G/DL (ref 6.3–8.7)
RBC # BLD AUTO: 3.28 10*6/MM3 (ref 3.77–5.28)
RBC # BLD AUTO: 3.52 10*6/MM3 (ref 3.77–5.28)
SODIUM SERPL-SCNC: 138 MMOL/L (ref 135–145)
SODIUM SERPL-SCNC: 139 MMOL/L (ref 136–145)
TIBC SERPL-MCNC: 308 MCG/DL (ref 298–536)
TRANSFERRIN SERPL-MCNC: 207 MG/DL (ref 200–360)
TRIGL SERPL-MCNC: 339 MG/DL (ref 0–149)
TSH SERPL DL<=0.05 MIU/L-ACNC: 3.01 UIU/ML (ref 0.27–4.2)
VIT B12 BLD-MCNC: 815 PG/ML (ref 211–946)
VLDLC SERPL-MCNC: 67.8 MG/DL
WBC # BLD AUTO: 10.15 10*3/MM3 (ref 3.4–10.8)
WBC # BLD AUTO: 10.2 10*3/MM3 (ref 3.4–10.8)

## 2020-07-15 PROCEDURE — 82306 VITAMIN D 25 HYDROXY: CPT | Performed by: INTERNAL MEDICINE

## 2020-07-15 PROCEDURE — 82043 UR ALBUMIN QUANTITATIVE: CPT | Performed by: INTERNAL MEDICINE

## 2020-07-15 PROCEDURE — 83036 HEMOGLOBIN GLYCOSYLATED A1C: CPT | Performed by: INTERNAL MEDICINE

## 2020-07-15 PROCEDURE — 82728 ASSAY OF FERRITIN: CPT

## 2020-07-15 PROCEDURE — 36415 COLL VENOUS BLD VENIPUNCTURE: CPT | Performed by: INTERNAL MEDICINE

## 2020-07-15 PROCEDURE — 80061 LIPID PANEL: CPT | Performed by: INTERNAL MEDICINE

## 2020-07-15 PROCEDURE — 80053 COMPREHEN METABOLIC PANEL: CPT | Performed by: INTERNAL MEDICINE

## 2020-07-15 PROCEDURE — 85025 COMPLETE CBC W/AUTO DIFF WBC: CPT | Performed by: INTERNAL MEDICINE

## 2020-07-15 PROCEDURE — 84466 ASSAY OF TRANSFERRIN: CPT

## 2020-07-15 PROCEDURE — 99213 OFFICE O/P EST LOW 20 MIN: CPT | Performed by: INTERNAL MEDICINE

## 2020-07-15 PROCEDURE — 83540 ASSAY OF IRON: CPT

## 2020-07-15 PROCEDURE — 25010000002 EPOETIN ALFA-EPBX 40000 UNIT/ML SOLUTION: Performed by: INTERNAL MEDICINE

## 2020-07-15 PROCEDURE — 85025 COMPLETE CBC W/AUTO DIFF WBC: CPT

## 2020-07-15 PROCEDURE — 82607 VITAMIN B-12: CPT | Performed by: INTERNAL MEDICINE

## 2020-07-15 PROCEDURE — 96372 THER/PROPH/DIAG INJ SC/IM: CPT

## 2020-07-15 PROCEDURE — 82570 ASSAY OF URINE CREATININE: CPT | Performed by: INTERNAL MEDICINE

## 2020-07-15 PROCEDURE — 80053 COMPREHEN METABOLIC PANEL: CPT

## 2020-07-15 PROCEDURE — 84443 ASSAY THYROID STIM HORMONE: CPT | Performed by: INTERNAL MEDICINE

## 2020-07-15 RX ORDER — FAMOTIDINE 10 MG/ML
20 INJECTION, SOLUTION INTRAVENOUS AS NEEDED
Status: CANCELLED | OUTPATIENT
Start: 2020-07-31

## 2020-07-15 RX ORDER — CITALOPRAM 40 MG/1
40 TABLET ORAL
COMMUNITY
Start: 2020-06-28 | End: 2021-03-25

## 2020-07-15 RX ORDER — LAMOTRIGINE 100 MG/1
100 TABLET ORAL
COMMUNITY
Start: 2020-06-28 | End: 2020-07-15 | Stop reason: SDUPTHER

## 2020-07-15 RX ORDER — DIPHENHYDRAMINE HYDROCHLORIDE 50 MG/ML
50 INJECTION INTRAMUSCULAR; INTRAVENOUS AS NEEDED
Status: CANCELLED | OUTPATIENT
Start: 2020-07-17

## 2020-07-15 RX ORDER — SODIUM CHLORIDE 9 MG/ML
250 INJECTION, SOLUTION INTRAVENOUS ONCE
Status: CANCELLED | OUTPATIENT
Start: 2020-07-17

## 2020-07-15 RX ORDER — FAMOTIDINE 10 MG/ML
20 INJECTION, SOLUTION INTRAVENOUS AS NEEDED
Status: CANCELLED | OUTPATIENT
Start: 2020-07-17

## 2020-07-15 RX ORDER — CARVEDILOL 12.5 MG/1
TABLET ORAL
COMMUNITY
Start: 2020-07-07 | End: 2020-08-19 | Stop reason: DRUGHIGH

## 2020-07-15 RX ORDER — SODIUM CHLORIDE 9 MG/ML
250 INJECTION, SOLUTION INTRAVENOUS ONCE
Status: CANCELLED | OUTPATIENT
Start: 2020-07-31

## 2020-07-15 RX ORDER — DIPHENHYDRAMINE HYDROCHLORIDE 50 MG/ML
50 INJECTION INTRAMUSCULAR; INTRAVENOUS AS NEEDED
Status: CANCELLED | OUTPATIENT
Start: 2020-07-31

## 2020-07-15 RX ADMIN — EPOETIN ALFA-EPBX 40000 UNITS: 40000 INJECTION, SOLUTION INTRAVENOUS; SUBCUTANEOUS at 13:34

## 2020-07-15 NOTE — TELEPHONE ENCOUNTER
Notified Maria C that her iron saturation is low and that  wants her to get 2 iron infusions.  Scheduled on 7/17/2020 and 7/24/20 at 1:30 p.m.

## 2020-07-15 NOTE — PROGRESS NOTES
[facility]  HEMATOLOGY & ONCOLOGY    Cancer Staging Information:  Cancer Staging  No matching staging information was found for the patient.      Subjective     VISIT DIAGNOSIS:   No diagnosis found.    REASON FOR VISIT:     Chief Complaint   Patient presents with   • Anemia     here for fu, no complaints         HEMATOLOGY / ONCOLOGY HISTORY:    No history exists.           INTERVAL HISTORY  Patient ID: Maria C Shrestha is a 60 y.o. year old female Cancer Staging    12/3/19: she has a lot of family stressors that is making her depressed.    7/15/2020: here for procrit. No new issues. Denies leg swelling  - Denies sob,cp,n/v/dizziness.occassional blood streaked stool from hemorrhoids.  --rest of ros unremarkable. Physical exam unremarkable.    Past Medical History:   Past Medical History:   Diagnosis Date   • Acquired hypothyroidism 2/6/2017   • Allergic    • Anemia    • Anxiety    • Arthritis    • Cellulitis    • Chronic kidney disease     3rd stage   • Depression    • Disease of thyroid gland    • Dyslipidemia    • Elevated cholesterol    • Essential hypertension    • Fatigue    • Gallbladder abscess    • Hearing loss    • Light headed    • Limited range of motion (ROM) of shoulder     right   • Obesity    • Palpitation    • Short of breath on exertion    • Sleep apnea    • Type 2 diabetes mellitus (CMS/HCC)    • Type II diabetes mellitus, uncontrolled (CMS/HCC)    • Wears glasses      Past Surgical History:   Past Surgical History:   Procedure Laterality Date   • ADENOIDECTOMY     • ANAL FISTULA REPAIR      x 2    • CHOLECYSTECTOMY     • COLONOSCOPY  01/02/2014   • COLONOSCOPY N/A 3/22/2017    Procedure: COLONOSCOPY WITH ANESTHESIA;  Surgeon: Mono Linder MD;  Location: [facility] ENDOSCOPY;  Service:    • D&C HYSTEROSCOPY N/A 7/25/2018    Procedure: DILATATION AND CURETTAGE HYSTEROSCOPY;  Surgeon: Michael Castaneda MD;  Location: [facility] OR;  Service: Obstetrics/Gynecology   • DILATATION AND  "CURETTAGE      X 2   • ENDOSCOPY     • TONSILLECTOMY       Social History:   Social History     Socioeconomic History   • Marital status:      Spouse name: Not on file   • Number of children: Not on file   • Years of education: Not on file   • Highest education level: Not on file   Tobacco Use   • Smoking status: Never Smoker   • Smokeless tobacco: Never Used   Substance and Sexual Activity   • Alcohol use: No   • Drug use: No   • Sexual activity: Never     Birth control/protection: Post-menopausal     Family History:   Family History   Problem Relation Age of Onset   • Diabetes Other    • Cancer Mother    • Cancer Father    • Heart disease Father    • Diabetes Father    • Obesity Father    • Stroke Father    • Cancer Maternal Grandmother    • Uterine cancer Maternal Grandmother    • Diabetes Maternal Grandfather    • Cancer Paternal Grandmother    • Colon cancer Paternal Grandmother    • Diabetes Paternal Grandfather    • Heart disease Paternal Grandfather    • No Known Problems Sister    • No Known Problems Brother    • No Known Problems Daughter    • No Known Problems Son    • No Known Problems Maternal Aunt    • No Known Problems Paternal Aunt    • BRCA 1/2 Neg Hx    • Breast cancer Neg Hx    • Endometrial cancer Neg Hx    • Ovarian cancer Neg Hx        Review of Systems   See HPI otherwise unremarkable.  Performance Status:  Asymptomatic    Medications:    Current Outpatient Medications   Medication Sig Dispense Refill   • allopurinol (ZYLOPRIM) 100 MG tablet Take 100 mg by mouth 2 (Two) Times a Day.     • amLODIPine (NORVASC) 10 MG tablet Take 10 mg by mouth.     • aspirin 81 MG tablet Take 81 mg by mouth Daily. Stop 7/22/2018     • BD INSULIN SYRINGE U/F 31G X 5/16\" 1 ML misc AS DIRECTED FOUR TIMES A  each 0   • busPIRone (BUSPAR) 10 MG tablet buspirone 10 mg tablet   Take 2 tablets twice a day by oral route as needed for 90 days.     • carvedilol (COREG) 12.5 MG tablet      • cetirizine " (zyrTEC) 10 MG tablet Take 10 mg by mouth As Needed.     • Cholecalciferol (VITAMIN D3) 77914 units tablet Vitamin D2 50,000 unit capsule   Take 1 capsule every week by oral route. Sunday     • citalopram (CeleXA) 40 MG tablet Take 40 mg by mouth.     • diazePAM (VALIUM) 2 MG tablet Take 2 mg by mouth As Needed for Anxiety.     • Dulaglutide (Trulicity) 1.5 MG/0.5ML solution pen-injector Inject 1.5 mg under the skin into the appropriate area as directed Every 7 (Seven) Days. 6 mL 3   • Ergocalciferol (VITAMIN D2 PO) Take 1,250 mcg by mouth 1 (One) Time Per Week. VITAMIN D2 1250 MCG (50,000 UNIT) CAPSULE TAKE 1 CAPSULE EVERY WEEK BY ORAL ROUTE     • Evolocumab 140 MG/ML solution auto-injector Inject 1 mL under the skin into the appropriate area as directed Every 14 (Fourteen) Days for 24 doses. NDC [address]-36 6 mL 10   • fluticasone (VERAMYST) 27.5 MCG/SPRAY nasal spray 2 sprays into each nostril Daily.     • Glucose Blood (BLOOD GLUCOSE TEST) strip Use 4 x daily, use any brand covered by insurance or same brand as before 360 each 3   • ibuprofen (ADVIL,MOTRIN) 800 MG tablet Take 800 mg by mouth Every 8 (Eight) Hours As Needed for Mild Pain .     • insulin glargine (Lantus) 100 UNIT/ML injection INJECT 200 UNITS DAILY 180 mL 3   • Insulin Lispro (HUMALOG KWIKPEN) 200 UNIT/ML solution pen-injector Inject 80 Units under the skin into the appropriate area as directed 3 (Three) Times a Day With Meals. 36 pen 11   • Insulin Pen Needle (B-D ULTRAFINE III SHORT PEN) 31G X 8 MM misc USE AS DIRECTED 200 each 0   • lamoTRIgine (LaMICtal) 50 MG tablet dispersible disintegrating tablet Take 50 mg by mouth Every Night.     • Lancets (FREESTYLE) lancets FOUR TIMES A  each 11   • levothyroxine (Synthroid) 88 MCG tablet Take 1 tablet by mouth Daily. 30 tablet 11   • lisinopril (PRINIVIL,ZESTRIL) 40 MG tablet Take 40 mg by mouth 2 (Two) Times a Day.     • Melatonin 10 MG tablet Take  by mouth Every Night.     • Multiple  "Vitamins-Minerals (MULTIVITAMIN ADULT PO) Take 1 tablet by mouth Daily.     • Omega-3 Fatty Acids (FISH OIL) 1000 MG capsule capsule Take 2,000 mg by mouth Daily With Dinner.     • potassium gluconate 595 (99 K) MG tablet tablet potassium gluconate 595 mg (99 mg) tablet   Take 1 tablets every day by oral route.     • rosuvastatin (CRESTOR) 10 MG tablet Take 10 mg by mouth Daily.     • sodium bicarbonate 650 MG tablet Take 650 mg by mouth 4 (Four) Times a Day.     • traMADol (ULTRAM) 50 MG tablet Take 1 tablet by mouth Every 6 (Six) Hours As Needed for Moderate Pain . 8 tablet 0     No current facility-administered medications for this visit.        ALLERGIES:    Allergies   Allergen Reactions   • Codeine Nausea Only       Objective      Vitals:    07/15/20 1307   BP: 150/70   Pulse: 73   Resp: 18   Temp: 97.6 °F (36.4 °C)   SpO2: 98%   Weight: (!) 173 kg (382 lb)   Height: 166.4 cm (65.5\")   PainSc: 0-No pain         Current Status 7/15/2020   ECOG score 1         Physical Examremains the same  General Appearance:obese female.  Patient is awake, alert, oriented and in no acute distress. Patient is welldeveloped, wellnourished, and appears stated age.  HEENT: Normocephalic. Sclerae clear, conjunctiva pink, extraocular movements intact, pupils, round, reactive to light and  accommodation. Mouth and throat are clear with moist oral mucosa.  NECK: Supple, no jugular venous distention, thyroid not enlarged.  LYMPH: No cervical, supraclavicular, axillary, or inguinal lymphadenopathy.  CHEST: Equal bilateral expansion, AP  diameter normal, resonant percussion note  LUNGS: Good air movement, no rales, rhonchi, rubs or wheezes with auscultation  CARDIO: Regular sinus rhythm, no murmurs, gallops or rubs.  ABDOMEN: obese abdomen. Nondistended, soft, No tenderness, no guarding, no rebound, No hepatosplenomegaly. No abdominal masses. Bowel sounds positive. No hernia  GENITALIA: Not examined.  BREASTS: Not examined.  MUSKEL: No " joint swelling, decreased motion, or inflammation  EXTREMS: kristie YAZAN. No clubbing, cyanosis, No varicose veins.  NEURO: Grossly nonfocal, Gait is coordinated and smooth, Cognition is preserved.  SKIN: No rashes, no ecchymoses, no petechia.  PSYCH: Oriented to time, place and person. Memory is preserved. Mood and affect appear normal  RECENT LABS:  Lab on 07/15/2020   Component Date Value Ref Range Status   • WBC 07/15/2020 10.15  3.40 - 10.80 10*3/mm3 Final   • RBC 07/15/2020 3.28* 3.77 - 5.28 10*6/mm3 Final   • Hemoglobin 07/15/2020 10.0* 12.0 - 15.9 g/dL Final   • Hematocrit 07/15/2020 30.5* 34.0 - 46.6 % Final   • MCV 07/15/2020 93.0  79.0 - 97.0 fL Final   • MCH 07/15/2020 30.5  26.6 - 33.0 pg Final   • MCHC 07/15/2020 32.8  31.5 - 35.7 g/dL Final   • RDW 07/15/2020 16.0* 12.3 - 15.4 % Final   • RDW-SD 07/15/2020 54.6* 37.0 - 54.0 fl Final   • MPV 07/15/2020 10.3  6.0 - 12.0 fL Final   • Platelets 07/15/2020 218  140 - 450 10*3/mm3 Final   • Neutrophil % 07/15/2020 74.4  42.7 - 76.0 % Final   • Lymphocyte % 07/15/2020 17.8* 19.6 - 45.3 % Final   • Monocyte % 07/15/2020 4.7* 5.0 - 12.0 % Final   • Eosinophil % 07/15/2020 2.1  0.3 - 6.2 % Final   • Basophil % 07/15/2020 0.5  0.0 - 1.5 % Final   • Immature Grans % 07/15/2020 0.5  0.0 - 0.5 % Final   • Neutrophils, Absolute 07/15/2020 7.55* 1.70 - 7.00 10*3/mm3 Final   • Lymphocytes, Absolute 07/15/2020 1.81  0.70 - 3.10 10*3/mm3 Final   • Monocytes, Absolute 07/15/2020 0.48  0.10 - 0.90 10*3/mm3 Final   • Eosinophils, Absolute 07/15/2020 0.21  0.00 - 0.40 10*3/mm3 Final   • Basophils, Absolute 07/15/2020 0.05  0.00 - 0.20 10*3/mm3 Final   • Immature Grans, Absolute 07/15/2020 0.05  0.00 - 0.05 10*3/mm3 Final   • nRBC 07/15/2020 0.0  0.0 - 0.2 /100 WBC Final       RADIOLOGY:  No results found.         Assessment/Plan  Maria C Shrestha is a 60 y.o. year old female  Whom we are seeing for AoCKD on procrit that is stable.    Patient Active Problem List   Diagnosis   • Type  2 diabetes mellitus with stage 3 chronic kidney disease, with long-term current use of insulin (CMS/HCC)   • Mixed diabetic hyperlipidemia associated with type 2 diabetes mellitus (CMS/HCC)   • Hypertension associated with diabetes (CMS/HCC)   • Vitamin D deficiency   • B12 deficiency   • Anemia in CKD (chronic kidney disease)   • Acquired hypothyroidism   • Chronic kidney disease, stage 4 (severe) (CMS/HCC)   • Anemia   • Anxiety   • Deep venous thrombosis of lower extremity (CMS/HCC)   • Disease of liver   • Embolism (CMS/HCC)   • Hypertriglyceridemia   • Hypocalcemia   • Mood disorder (CMS/HCC)   • Morbid obesity (CMS/HCC)   • Sleep apnea   • Acute renal failure superimposed on stage 3 chronic kidney disease (CMS/HCC)   • Anemia of chronic renal failure   • Hypertension   • Diabetes mellitus (CMS/HCC)   • Thyroid disease   • Hypothyroidism   • Type 2 DM with CKD stage 3 and hypertension (CMS/HCC)   • Vitamin D deficiency          1. AoCKD: s/p iron infusions. Iron 72, %sat 25.  - Labs reviewed with the pt cr 2.98. Wbc 10.20,  Hg 10.7, plt 230  - Ok for procrit shot today. Goal Hg is >10 and <12.        2. HTN: on amlodipine, carvedilol, lisinopril    3.DM: on trulicity    4.Hypothyroidism: on synthroid  5. Anxiety/depression: on diazepam, celexa  6. Hyperlipidemia: on crestor.  7. CKD stage 4: sees renal. grf 17-20   pt cr 2.64. Advised increased fluid intake daily,           Rodrigo Huitron MD    7/15/2020    13:19

## 2020-07-15 NOTE — TELEPHONE ENCOUNTER
----- Message from Rodrigo Huitron MD sent at 7/15/2020  2:00 PM CDT -----  Please call the patient regarding her abnormal result.injectafer x2 needed. tnx

## 2020-07-17 ENCOUNTER — INFUSION (OUTPATIENT)
Dept: ONCOLOGY | Facility: HOSPITAL | Age: 60
End: 2020-07-17

## 2020-07-17 VITALS
TEMPERATURE: 97.4 F | WEIGHT: 293 LBS | DIASTOLIC BLOOD PRESSURE: 50 MMHG | HEIGHT: 65 IN | BODY MASS INDEX: 48.82 KG/M2 | SYSTOLIC BLOOD PRESSURE: 139 MMHG | HEART RATE: 65 BPM | RESPIRATION RATE: 18 BRPM | OXYGEN SATURATION: 98 %

## 2020-07-17 DIAGNOSIS — D63.1 ANEMIA IN STAGE 4 CHRONIC KIDNEY DISEASE (HCC): ICD-10-CM

## 2020-07-17 DIAGNOSIS — N18.4 CHRONIC KIDNEY DISEASE, STAGE 4 (SEVERE) (HCC): Primary | ICD-10-CM

## 2020-07-17 DIAGNOSIS — N18.4 ANEMIA IN STAGE 4 CHRONIC KIDNEY DISEASE (HCC): ICD-10-CM

## 2020-07-17 PROCEDURE — 96365 THER/PROPH/DIAG IV INF INIT: CPT

## 2020-07-17 PROCEDURE — 25010000002 FERRIC CARBOXYMALTOSE 750 MG/15ML SOLUTION 15 ML VIAL: Performed by: INTERNAL MEDICINE

## 2020-07-17 RX ORDER — SODIUM CHLORIDE 9 MG/ML
250 INJECTION, SOLUTION INTRAVENOUS ONCE
Status: COMPLETED | OUTPATIENT
Start: 2020-07-17 | End: 2020-07-17

## 2020-07-17 RX ORDER — DIPHENHYDRAMINE HYDROCHLORIDE 50 MG/ML
50 INJECTION INTRAMUSCULAR; INTRAVENOUS AS NEEDED
Status: DISCONTINUED | OUTPATIENT
Start: 2020-07-17 | End: 2020-07-17 | Stop reason: HOSPADM

## 2020-07-17 RX ORDER — FAMOTIDINE 10 MG/ML
20 INJECTION, SOLUTION INTRAVENOUS AS NEEDED
Status: DISCONTINUED | OUTPATIENT
Start: 2020-07-17 | End: 2020-07-17 | Stop reason: HOSPADM

## 2020-07-17 RX ADMIN — SODIUM CHLORIDE 250 ML: 9 INJECTION, SOLUTION INTRAVENOUS at 13:58

## 2020-07-17 RX ADMIN — FERRIC CARBOXYMALTOSE INJECTION 750 MG: 50 INJECTION, SOLUTION INTRAVENOUS at 13:59

## 2020-07-20 ENCOUNTER — OFFICE VISIT (OUTPATIENT)
Dept: ENDOCRINOLOGY | Facility: CLINIC | Age: 60
End: 2020-07-20

## 2020-07-20 VITALS
WEIGHT: 293 LBS | OXYGEN SATURATION: 98 % | HEIGHT: 66 IN | SYSTOLIC BLOOD PRESSURE: 126 MMHG | DIASTOLIC BLOOD PRESSURE: 60 MMHG | BODY MASS INDEX: 47.09 KG/M2 | HEART RATE: 83 BPM

## 2020-07-20 DIAGNOSIS — E11.22 TYPE 2 DIABETES MELLITUS WITH STAGE 3 CHRONIC KIDNEY DISEASE, WITH LONG-TERM CURRENT USE OF INSULIN (HCC): Primary | ICD-10-CM

## 2020-07-20 DIAGNOSIS — E78.2 MIXED DIABETIC HYPERLIPIDEMIA ASSOCIATED WITH TYPE 2 DIABETES MELLITUS (HCC): ICD-10-CM

## 2020-07-20 DIAGNOSIS — Z79.4 TYPE 2 DIABETES MELLITUS WITH STAGE 3 CHRONIC KIDNEY DISEASE, WITH LONG-TERM CURRENT USE OF INSULIN (HCC): Primary | ICD-10-CM

## 2020-07-20 DIAGNOSIS — E11.59 HYPERTENSION ASSOCIATED WITH DIABETES (HCC): ICD-10-CM

## 2020-07-20 DIAGNOSIS — I15.2 HYPERTENSION ASSOCIATED WITH DIABETES (HCC): ICD-10-CM

## 2020-07-20 DIAGNOSIS — E55.9 VITAMIN D DEFICIENCY: ICD-10-CM

## 2020-07-20 DIAGNOSIS — E11.69 MIXED DIABETIC HYPERLIPIDEMIA ASSOCIATED WITH TYPE 2 DIABETES MELLITUS (HCC): ICD-10-CM

## 2020-07-20 DIAGNOSIS — N18.30 TYPE 2 DIABETES MELLITUS WITH STAGE 3 CHRONIC KIDNEY DISEASE, WITH LONG-TERM CURRENT USE OF INSULIN (HCC): Primary | ICD-10-CM

## 2020-07-20 PROCEDURE — 95251 CONT GLUC MNTR ANALYSIS I&R: CPT | Performed by: INTERNAL MEDICINE

## 2020-07-20 PROCEDURE — 99214 OFFICE O/P EST MOD 30 MIN: CPT | Performed by: INTERNAL MEDICINE

## 2020-07-20 NOTE — PROGRESS NOTES
Maria C Shrestha is a 60 y.o. female who presents for  evaluation of   Chief Complaint   Patient presents with   • Diabetes            Primary Care / Referring Provider  Ole Soliman MD    History of Present Illness  Duration/Timing:  Diabetes mellitus type 2  timing constant    quality loss some control     severity high     Severity (Complications/Hospitalizations)  Secondary Microvascular Complications:  Diabetic Nephropathy, No Diabetic Neuropathy    Macrovascular  , Carotid Artery Disease      Context  Diabetes Regimen:  Insulin, Compliant with regimen  Blood Glucose Readings    Running elevated, admitted to stress eating     Diet    counts carbs, eating only 45 g cho per meal     Exercise:  Does not exercise    Associated Signs/Symptoms  Hyperglycemic Symptoms:  No polyuria, No polydipsia, No polyphagia, Weight loss with keto diet before but not now   Hypoglycemic Episodes:  No documented hypoglycemia    Past Medical History:   Diagnosis Date   • Acquired hypothyroidism 2/6/2017   • Allergic    • Anemia    • Anxiety    • Arthritis    • Cellulitis    • Chronic kidney disease     3rd stage   • Depression    • Disease of thyroid gland    • Dyslipidemia    • Elevated cholesterol    • Essential hypertension    • Fatigue    • Gallbladder abscess    • Hearing loss    • Light headed    • Limited range of motion (ROM) of shoulder     right   • Obesity    • Palpitation    • Short of breath on exertion    • Sleep apnea    • Type 2 diabetes mellitus (CMS/HCC)    • Type II diabetes mellitus, uncontrolled (CMS/HCC)    • Wears glasses      Family History   Problem Relation Age of Onset   • Diabetes Other    • Cancer Mother    • Cancer Father    • Heart disease Father    • Diabetes Father    • Obesity Father    • Stroke Father    • Cancer Maternal Grandmother    • Uterine cancer Maternal Grandmother    • Diabetes Maternal Grandfather    • Cancer Paternal Grandmother    • Colon cancer Paternal Grandmother    • Diabetes  "Paternal Grandfather    • Heart disease Paternal Grandfather    • No Known Problems Sister    • No Known Problems Brother    • No Known Problems Daughter    • No Known Problems Son    • No Known Problems Maternal Aunt    • No Known Problems Paternal Aunt    • BRCA 1/2 Neg Hx    • Breast cancer Neg Hx    • Endometrial cancer Neg Hx    • Ovarian cancer Neg Hx      Social History     Tobacco Use   • Smoking status: Never Smoker   • Smokeless tobacco: Never Used   Substance Use Topics   • Alcohol use: No   • Drug use: No         Current Outpatient Medications:   •  allopurinol (ZYLOPRIM) 100 MG tablet, Take 100 mg by mouth 2 (Two) Times a Day., Disp: , Rfl:   •  amLODIPine (NORVASC) 10 MG tablet, Take 10 mg by mouth., Disp: , Rfl:   •  aspirin 81 MG tablet, Take 81 mg by mouth Daily. Stop 7/22/2018, Disp: , Rfl:   •  BD INSULIN SYRINGE U/F 31G X 5/16\" 1 ML misc, AS DIRECTED FOUR TIMES A DAY, Disp: 200 each, Rfl: 0  •  busPIRone (BUSPAR) 10 MG tablet, buspirone 10 mg tablet  Take 2 tablets twice a day by oral route as needed for 90 days., Disp: , Rfl:   •  carvedilol (COREG) 12.5 MG tablet, , Disp: , Rfl:   •  cetirizine (zyrTEC) 10 MG tablet, Take 10 mg by mouth As Needed., Disp: , Rfl:   •  Cholecalciferol (VITAMIN D3) 36765 units tablet, Vitamin D2 50,000 unit capsule  Take 1 capsule every week by oral route. Sunday, Disp: , Rfl:   •  citalopram (CeleXA) 40 MG tablet, Take 40 mg by mouth., Disp: , Rfl:   •  diazePAM (VALIUM) 2 MG tablet, Take 2 mg by mouth As Needed for Anxiety., Disp: , Rfl:   •  Dulaglutide (Trulicity) 1.5 MG/0.5ML solution pen-injector, Inject 1.5 mg under the skin into the appropriate area as directed Every 7 (Seven) Days., Disp: 6 mL, Rfl: 3  •  Evolocumab 140 MG/ML solution auto-injector, Inject 1 mL under the skin into the appropriate area as directed Every 14 (Fourteen) Days for 24 doses. NDC [address]-64, Disp: 6 mL, Rfl: 10  •  fluticasone (VERAMYST) 27.5 MCG/SPRAY nasal spray, 2 sprays " into each nostril Daily., Disp: , Rfl:   •  Glucose Blood (BLOOD GLUCOSE TEST) strip, Use 4 x daily, use any brand covered by insurance or same brand as before, Disp: 360 each, Rfl: 3  •  ibuprofen (ADVIL,MOTRIN) 800 MG tablet, Take 800 mg by mouth Every 8 (Eight) Hours As Needed for Mild Pain ., Disp: , Rfl:   •  insulin glargine (Lantus) 100 UNIT/ML injection, INJECT 200 UNITS DAILY, Disp: 180 mL, Rfl: 3  •  Insulin Lispro (HUMALOG KWIKPEN) 200 UNIT/ML solution pen-injector, Inject 80 Units under the skin into the appropriate area as directed 3 (Three) Times a Day With Meals., Disp: 36 pen, Rfl: 11  •  Insulin Pen Needle (B-D ULTRAFINE III SHORT PEN) 31G X 8 MM misc, USE AS DIRECTED, Disp: 200 each, Rfl: 0  •  lamoTRIgine (LaMICtal) 50 MG tablet dispersible disintegrating tablet, Take 50 mg by mouth Every Night., Disp: , Rfl:   •  Lancets (FREESTYLE) lancets, FOUR TIMES A DAY, Disp: 150 each, Rfl: 11  •  levothyroxine (Synthroid) 88 MCG tablet, Take 1 tablet by mouth Daily., Disp: 30 tablet, Rfl: 11  •  lisinopril (PRINIVIL,ZESTRIL) 40 MG tablet, Take 40 mg by mouth 2 (Two) Times a Day., Disp: , Rfl:   •  Melatonin 10 MG tablet, Take  by mouth At Night As Needed., Disp: , Rfl:   •  Multiple Vitamins-Minerals (MULTIVITAMIN ADULT PO), Take 1 tablet by mouth Daily., Disp: , Rfl:   •  Omega-3 Fatty Acids (FISH OIL) 1000 MG capsule capsule, Take 2,000 mg by mouth Daily With Dinner., Disp: , Rfl:   •  potassium gluconate 595 (99 K) MG tablet tablet, potassium gluconate 595 mg (99 mg) tablet  Take 1 tablets every day by oral route., Disp: , Rfl:   •  rosuvastatin (CRESTOR) 10 MG tablet, Take 10 mg by mouth Daily., Disp: , Rfl:   •  sodium bicarbonate 650 MG tablet, Take 650 mg by mouth 4 (Four) Times a Day., Disp: , Rfl:   •  traMADol (ULTRAM) 50 MG tablet, Take 1 tablet by mouth Every 6 (Six) Hours As Needed for Moderate Pain ., Disp: 8 tablet, Rfl: 0  •  Ergocalciferol (VITAMIN D2 PO), Take 1,250 mcg by mouth 1 (One)  Time Per Week. VITAMIN D2 1250 MCG (50,000 UNIT) CAPSULE TAKE 1 CAPSULE EVERY WEEK BY ORAL ROUTE, Disp: , Rfl:     Review of Systems    Review of Systems   Constitutional: Positive for unexpected weight change. Negative for activity change, appetite change, chills, diaphoresis, fatigue and fever.   HENT: Negative for congestion, drooling, ear discharge, ear pain, facial swelling, mouth sores, nosebleeds, postnasal drip, sinus pressure, sneezing, sore throat, tinnitus, trouble swallowing and voice change.    Eyes: Negative.  Negative for photophobia, pain, discharge, redness and itching.   Respiratory: Negative.  Negative for apnea, cough, choking, chest tightness, shortness of breath, wheezing and stridor.    Cardiovascular: Negative.  Negative for chest pain, palpitations and leg swelling.   Gastrointestinal: Negative.  Negative for abdominal distention, abdominal pain, constipation, diarrhea, nausea and vomiting.   Endocrine: Positive for polydipsia, polyphagia and polyuria. Negative for cold intolerance and heat intolerance.   Genitourinary: Negative for difficulty urinating, dysuria, flank pain and frequency.   Musculoskeletal: Positive for arthralgias, back pain and myalgias. Negative for gait problem, joint swelling, neck pain and neck stiffness.   Skin: Negative for color change, pallor, rash and wound.   Allergic/Immunologic: Negative for environmental allergies, food allergies and immunocompromised state.   Neurological: Negative for dizziness, tremors, seizures, syncope, facial asymmetry, speech difficulty, weakness, light-headedness, numbness and headaches.   Hematological: Negative for adenopathy. Does not bruise/bleed easily.   Psychiatric/Behavioral: Negative for agitation, behavioral problems, confusion, decreased concentration, dysphoric mood, hallucinations, self-injury, sleep disturbance and suicidal ideas. The patient is not nervous/anxious and is not hyperactive.         Objective:     /60   " Pulse 83   Ht 166.4 cm (65.5\")   Wt (!) 174 kg (383 lb 1.6 oz)   LMP  (LMP Unknown)   SpO2 98%   BMI 62.78 kg/m²     Physical Exam   Constitutional: She is oriented to person, place, and time. She appears well-developed.   HENT:   Head: Normocephalic.   Right Ear: External ear normal.   Left Ear: External ear normal.   Nose: Nose normal.   Eyes: Conjunctivae and EOM are normal. No scleral icterus.   Neck: Normal range of motion. Neck supple. No tracheal deviation present. No thyromegaly present.   Cardiovascular: Normal rate, regular rhythm, normal heart sounds and intact distal pulses. Exam reveals no gallop and no friction rub.   No murmur heard.  Pulmonary/Chest: Effort normal and breath sounds normal. No stridor. No respiratory distress. She has no wheezes. She has no rales. She exhibits no tenderness.   Abdominal: Soft. Bowel sounds are normal. She exhibits no distension and no mass. There is no tenderness. There is no rebound and no guarding.   Musculoskeletal: Normal range of motion. She exhibits no tenderness or deformity.   Lymphadenopathy:     She has no cervical adenopathy.   Neurological: She is alert and oriented to person, place, and time. She displays normal reflexes. She exhibits normal muscle tone. Coordination normal.   Skin: No rash noted. No erythema. No pallor.   Psychiatric: She has a normal mood and affect. Her behavior is normal. Judgment and thought content normal.       Lab Review            Assessment/Plan       ICD-10-CM ICD-9-CM   1. Type 2 diabetes mellitus with stage 3 chronic kidney disease, with long-term current use of insulin (CMS/Formerly [facility]) E11.22 250.40    N18.3 585.3    Z79.4 V58.67   2. Hypertension associated with diabetes (CMS/Former[facility]) E11.59 250.80    I10 401.9   3. Mixed diabetic hyperlipidemia associated with type 2 diabetes mellitus (CMS/Former[facility]) E11.69 250.80    E78.2 272.2       Glycemic Management:   Lab Results   Component Value Date    HGBA1C 7.0 (H) 07/15/2020    HGBA1C " 8.0 (H) 04/13/2020    HGBA1C 7.6 (H) 01/08/2020     Lab Results   Component Value Date    GLUCOSE 190 (H) 07/15/2020    BUN 40 (H) 07/15/2020    CREATININE 2.93 (H) 07/15/2020    EGFRIFNONA 16 (L) 07/15/2020    BCR 13.7 07/15/2020    CO2 22.0 07/15/2020    CALCIUM 9.2 07/15/2020    ALBUMIN 3.60 07/15/2020    AST 14 07/15/2020    ALT 10 07/15/2020     Lab Results   Component Value Date    WBC 10.15 07/15/2020    HGB 10.0 (L) 07/15/2020    HCT 30.5 (L) 07/15/2020    MCV 93.0 07/15/2020     07/15/2020     Lab Results   Component Value Date    CREATININE 2.93 (H) 07/15/2020    CREATININE 2.98 (H) 07/15/2020    CREATININE 2.64 (H) 06/17/2020    CREATININE 2.83 (H) 05/20/2020    CREATININE 2.77 (H) 04/22/2020        Filippo for the past 2 weeks    In range 81%    Lows 3% ( nocturnal )      Trulicity 1.5 mg weekly       Humulin U 500 before     Lantus 160 at night change to 145     invokana , I wanted to add but GFR less than 30     Will give rx for u200 humalog up to 20-40 units with meals -- needs up to 80 w meals add 5 to meals          Lipid Management    Lab Results   Component Value Date    TRIG 339 (H) 07/15/2020    TRIG 336 (H) 04/13/2020    TRIG 324 (H) 01/08/2020     Lab Results   Component Value Date    HDL 36 (L) 07/15/2020    HDL 45 (L) 04/13/2020    HDL 37 (L) 01/08/2020     No components found for: LDLCALC  Lab Results   Component Value Date    LDL 17 07/15/2020    LDL 51 04/13/2020    LDL 98 01/08/2020     Lab Results   Component Value Date    LDL 17 07/15/2020    LDL 51 04/13/2020    LDL 98 01/08/2020         on Crestor 20 mg daily   Generic causing myalgias, higher doses not tolerated    Need to add repatha since LDL goal is less than 70 due to carotid artery disease and already on maximal tolerated statin and intolerant to zetia     Now on repatha and working     Blood Pressure Management:    Vitals:    07/20/20 1326   BP: 126/60   Pulse: 83   SpO2: 98%         Per nephrology , stable function        Microvascular Complication Monitoring:  No Microalbuminuria, No Diabetic Retinopathy, Date of last eye exam 07/18/2019, No Diabetic Neuropathy  on ACE i and coreg 12.5 po bid     ckd stage IV    followed by nephrology     I suggest that she doesn't consume more than 140 g protein per day   Plant better than animal but restricted on keto     --      Lab Results   Component Value Date    CREATININE 2.93 (H) 07/15/2020    BUN 40 (H) 07/15/2020     07/15/2020    K 4.4 07/15/2020     07/15/2020    CO2 22.0 07/15/2020         Lab Results   Component Value Date    CREATININE 2.93 (H) 07/15/2020    CREATININE 2.98 (H) 07/15/2020    CREATININE 2.64 (H) 06/17/2020         Immunizations:  Last pneumococcal immunization pneumovax before age 65     Flu Shot will have in Mid Nov 2016       Preventive Care:  No smoking      Weight Related:   Wt Readings from Last 3 Encounters:   07/20/20 (!) 174 kg (383 lb 1.6 oz)   07/17/20 (!) 174 kg (383 lb 6.4 oz)   07/15/20 (!) 174 kg (383 lb 6.4 oz)     Body mass index is 62.78 kg/m².      prefers no bariatric surgery    Now on cpap       Bone Health    Lab Results   Component Value Date    PTH 38.3 09/04/2019    CALCIUM 9.2 07/15/2020     For JARETH     Was having hypercalcemia with rocatrol 0.25 three times weekly and on calcium    Now on sensipar 30 mg daily but without calcium -- not on sensipar anymore     Start calcium low dose 400 mg daily     DXA No 2018, osteopenia left femoral neck      Monitor PTH , no more than 150 , no less than 60       Thyroid Health  Lab Results   Component Value Date    TSH 3.010 07/15/2020     On levothyroxine 50 ug daily - increase to 75 mcgs daily - increase to 88     Other Diabetes Related Aspects       Lab Results   Component Value Date    [id number] 815 07/15/2020     Lab Results   Component Value Date    WBC 10.15 07/15/2020    HGB 10.0 (L) 07/15/2020    HCT 30.5 (L) 07/15/2020    MCV 93.0 07/15/2020     07/15/2020     Lab  Results   Component Value Date    IRON 56 07/15/2020    TIBC 308 07/15/2020    FERRITIN 135.70 07/15/2020       Anemia , managed by nephrology   Deciding vs epo vs iron   But now   DUB, may need hysterectomy       Systolic Murmur, AS? Echo   Could be due to anemia     Echo and carotid US from 7-17 , normal echo and less than 50% blockage in carotids, repeat     I reviewed and summarized records from Ole Soliman MD from present year  and I reviewed / ordered labs.     No orders of the defined types were placed in this encounter.        A copy of my note was sent to Ole Soliman MD    Please see my above opinion and suggestions.       I spent 15 minutes reviewing patient electronic chart , reviewing medications , past history , active problems.   I provided advice regarding management of medical conditions, refilled prescriptions , ordered labs and arranged for future appointment.   Patient was advised to contact us if there were any unanswered questions or ongoing concerns.

## 2020-07-29 ENCOUNTER — TELEPHONE (OUTPATIENT)
Dept: ONCOLOGY | Facility: CLINIC | Age: 60
End: 2020-07-29

## 2020-07-31 ENCOUNTER — INFUSION (OUTPATIENT)
Dept: ONCOLOGY | Facility: HOSPITAL | Age: 60
End: 2020-07-31

## 2020-07-31 VITALS
HEART RATE: 63 BPM | WEIGHT: 293 LBS | SYSTOLIC BLOOD PRESSURE: 154 MMHG | BODY MASS INDEX: 48.82 KG/M2 | HEIGHT: 65 IN | OXYGEN SATURATION: 99 % | DIASTOLIC BLOOD PRESSURE: 51 MMHG | RESPIRATION RATE: 18 BRPM | TEMPERATURE: 96.9 F

## 2020-07-31 DIAGNOSIS — D63.1 ANEMIA IN STAGE 4 CHRONIC KIDNEY DISEASE (HCC): ICD-10-CM

## 2020-07-31 DIAGNOSIS — N18.4 CHRONIC KIDNEY DISEASE, STAGE 4 (SEVERE) (HCC): Primary | ICD-10-CM

## 2020-07-31 DIAGNOSIS — N18.4 ANEMIA IN STAGE 4 CHRONIC KIDNEY DISEASE (HCC): ICD-10-CM

## 2020-07-31 PROCEDURE — 25010000002 FERRIC CARBOXYMALTOSE 750 MG/15ML SOLUTION 15 ML VIAL: Performed by: INTERNAL MEDICINE

## 2020-07-31 PROCEDURE — 96365 THER/PROPH/DIAG IV INF INIT: CPT

## 2020-07-31 RX ORDER — FAMOTIDINE 10 MG/ML
20 INJECTION, SOLUTION INTRAVENOUS AS NEEDED
Status: DISCONTINUED | OUTPATIENT
Start: 2020-07-31 | End: 2020-07-31 | Stop reason: HOSPADM

## 2020-07-31 RX ORDER — SODIUM CHLORIDE 9 MG/ML
250 INJECTION, SOLUTION INTRAVENOUS ONCE
Status: COMPLETED | OUTPATIENT
Start: 2020-07-31 | End: 2020-07-31

## 2020-07-31 RX ORDER — DIPHENHYDRAMINE HYDROCHLORIDE 50 MG/ML
50 INJECTION INTRAMUSCULAR; INTRAVENOUS AS NEEDED
Status: DISCONTINUED | OUTPATIENT
Start: 2020-07-31 | End: 2020-07-31 | Stop reason: HOSPADM

## 2020-07-31 RX ADMIN — SODIUM CHLORIDE 250 ML: 9 INJECTION, SOLUTION INTRAVENOUS at 14:08

## 2020-07-31 RX ADMIN — FERRIC CARBOXYMALTOSE INJECTION 750 MG: 50 INJECTION, SOLUTION INTRAVENOUS at 14:08

## 2020-08-06 ENCOUNTER — TRANSCRIBE ORDERS (OUTPATIENT)
Dept: ADMINISTRATIVE | Facility: HOSPITAL | Age: 60
End: 2020-08-06

## 2020-08-06 DIAGNOSIS — Z12.31 ENCOUNTER FOR SCREENING MAMMOGRAM FOR MALIGNANT NEOPLASM OF BREAST: Primary | ICD-10-CM

## 2020-08-12 ENCOUNTER — HOSPITAL ENCOUNTER (OUTPATIENT)
Dept: MAMMOGRAPHY | Facility: HOSPITAL | Age: 60
Discharge: HOME | End: 2020-08-12
Admitting: NURSE PRACTITIONER

## 2020-08-12 DIAGNOSIS — Z12.31 ENCOUNTER FOR SCREENING MAMMOGRAM FOR MALIGNANT NEOPLASM OF BREAST: ICD-10-CM

## 2020-08-12 PROCEDURE — 77067 SCR MAMMO BI INCL CAD: CPT

## 2020-08-12 PROCEDURE — 77063 BREAST TOMOSYNTHESIS BI: CPT

## 2020-08-18 DIAGNOSIS — D63.1 ANEMIA DUE TO STAGE 4 CHRONIC KIDNEY DISEASE (HCC): ICD-10-CM

## 2020-08-18 DIAGNOSIS — N18.4 ANEMIA DUE TO STAGE 4 CHRONIC KIDNEY DISEASE (HCC): ICD-10-CM

## 2020-08-18 DIAGNOSIS — N18.4 CHRONIC KIDNEY DISEASE, STAGE 4 (SEVERE) (HCC): ICD-10-CM

## 2020-08-19 ENCOUNTER — OFFICE VISIT (OUTPATIENT)
Dept: ONCOLOGY | Facility: CLINIC | Age: 60
End: 2020-08-19

## 2020-08-19 ENCOUNTER — LAB (OUTPATIENT)
Dept: LAB | Facility: HOSPITAL | Age: 60
End: 2020-08-19

## 2020-08-19 ENCOUNTER — INFUSION (OUTPATIENT)
Dept: ONCOLOGY | Facility: HOSPITAL | Age: 60
End: 2020-08-19

## 2020-08-19 VITALS
DIASTOLIC BLOOD PRESSURE: 68 MMHG | SYSTOLIC BLOOD PRESSURE: 156 MMHG | BODY MASS INDEX: 47.09 KG/M2 | HEART RATE: 60 BPM | OXYGEN SATURATION: 97 % | WEIGHT: 293 LBS | TEMPERATURE: 98.2 F | HEIGHT: 66 IN

## 2020-08-19 VITALS
TEMPERATURE: 99 F | DIASTOLIC BLOOD PRESSURE: 62 MMHG | SYSTOLIC BLOOD PRESSURE: 167 MMHG | RESPIRATION RATE: 16 BRPM | HEART RATE: 61 BPM | HEIGHT: 65 IN | BODY MASS INDEX: 48.82 KG/M2 | WEIGHT: 293 LBS | OXYGEN SATURATION: 97 %

## 2020-08-19 DIAGNOSIS — N18.4 ANEMIA DUE TO STAGE 4 CHRONIC KIDNEY DISEASE (HCC): ICD-10-CM

## 2020-08-19 DIAGNOSIS — Z79.4 TYPE 2 DIABETES MELLITUS WITH STAGE 3 CHRONIC KIDNEY DISEASE, WITH LONG-TERM CURRENT USE OF INSULIN (HCC): Primary | ICD-10-CM

## 2020-08-19 DIAGNOSIS — E03.9 HYPOTHYROIDISM, UNSPECIFIED TYPE: ICD-10-CM

## 2020-08-19 DIAGNOSIS — D63.1 ANEMIA DUE TO STAGE 4 CHRONIC KIDNEY DISEASE (HCC): ICD-10-CM

## 2020-08-19 DIAGNOSIS — D63.1 ANEMIA DUE TO STAGE 4 CHRONIC KIDNEY DISEASE (HCC): Primary | ICD-10-CM

## 2020-08-19 DIAGNOSIS — E11.22 TYPE 2 DIABETES MELLITUS WITH STAGE 3 CHRONIC KIDNEY DISEASE, WITH LONG-TERM CURRENT USE OF INSULIN (HCC): Primary | ICD-10-CM

## 2020-08-19 DIAGNOSIS — N18.30 TYPE 2 DIABETES MELLITUS WITH STAGE 3 CHRONIC KIDNEY DISEASE, WITH LONG-TERM CURRENT USE OF INSULIN (HCC): Primary | ICD-10-CM

## 2020-08-19 DIAGNOSIS — N18.4 CHRONIC KIDNEY DISEASE, STAGE 4 (SEVERE) (HCC): ICD-10-CM

## 2020-08-19 DIAGNOSIS — N18.4 ANEMIA DUE TO STAGE 4 CHRONIC KIDNEY DISEASE (HCC): Primary | ICD-10-CM

## 2020-08-19 LAB
ALBUMIN SERPL-MCNC: 3.6 G/DL (ref 3.5–5.2)
ALBUMIN/GLOB SERPL: 1.2 G/DL
ALP SERPL-CCNC: 58 U/L (ref 39–117)
ALT SERPL W P-5'-P-CCNC: 8 U/L (ref 1–33)
ANION GAP SERPL CALCULATED.3IONS-SCNC: 12 MMOL/L (ref 5–15)
AST SERPL-CCNC: 10 U/L (ref 1–32)
BASOPHILS # BLD AUTO: 0.03 10*3/MM3 (ref 0–0.2)
BASOPHILS NFR BLD AUTO: 0.4 % (ref 0–1.5)
BILIRUB SERPL-MCNC: 0.2 MG/DL (ref 0–1.2)
BUN SERPL-MCNC: 33 MG/DL (ref 8–23)
BUN/CREAT SERPL: 11 (ref 7–25)
CALCIUM SPEC-SCNC: 9.3 MG/DL (ref 8.6–10.5)
CHLORIDE SERPL-SCNC: 106 MMOL/L (ref 98–107)
CO2 SERPL-SCNC: 24 MMOL/L (ref 22–29)
CREAT SERPL-MCNC: 2.99 MG/DL (ref 0.57–1)
DEPRECATED RDW RBC AUTO: 57.6 FL (ref 37–54)
EOSINOPHIL # BLD AUTO: 0.22 10*3/MM3 (ref 0–0.4)
EOSINOPHIL NFR BLD AUTO: 2.6 % (ref 0.3–6.2)
ERYTHROCYTE [DISTWIDTH] IN BLOOD BY AUTOMATED COUNT: 16.6 % (ref 12.3–15.4)
FERRITIN SERPL-MCNC: 639.6 NG/ML (ref 13–150)
GFR SERPL CREATININE-BSD FRML MDRD: 16 ML/MIN/1.73
GLOBULIN UR ELPH-MCNC: 3.1 GM/DL
GLUCOSE SERPL-MCNC: 136 MG/DL (ref 65–99)
HCT VFR BLD AUTO: 32.8 % (ref 34–46.6)
HGB BLD-MCNC: 10.6 G/DL (ref 12–15.9)
HOLD SPECIMEN: NORMAL
IMM GRANULOCYTES # BLD AUTO: 0.04 10*3/MM3 (ref 0–0.05)
IMM GRANULOCYTES NFR BLD AUTO: 0.5 % (ref 0–0.5)
IRON 24H UR-MRATE: 83 MCG/DL (ref 37–145)
IRON SATN MFR SERPL: 32 % (ref 20–50)
LYMPHOCYTES # BLD AUTO: 1.7 10*3/MM3 (ref 0.7–3.1)
LYMPHOCYTES NFR BLD AUTO: 19.9 % (ref 19.6–45.3)
MCH RBC QN AUTO: 30.6 PG (ref 26.6–33)
MCHC RBC AUTO-ENTMCNC: 32.3 G/DL (ref 31.5–35.7)
MCV RBC AUTO: 94.8 FL (ref 79–97)
MONOCYTES # BLD AUTO: 0.48 10*3/MM3 (ref 0.1–0.9)
MONOCYTES NFR BLD AUTO: 5.6 % (ref 5–12)
NEUTROPHILS NFR BLD AUTO: 6.06 10*3/MM3 (ref 1.7–7)
NEUTROPHILS NFR BLD AUTO: 71 % (ref 42.7–76)
NRBC BLD AUTO-RTO: 0 /100 WBC (ref 0–0.2)
PLATELET # BLD AUTO: 173 10*3/MM3 (ref 140–450)
PMV BLD AUTO: 10.4 FL (ref 6–12)
POTASSIUM SERPL-SCNC: 4.3 MMOL/L (ref 3.5–5.2)
PROT SERPL-MCNC: 6.7 G/DL (ref 6–8.5)
RBC # BLD AUTO: 3.46 10*6/MM3 (ref 3.77–5.28)
SODIUM SERPL-SCNC: 142 MMOL/L (ref 136–145)
TIBC SERPL-MCNC: 256 MCG/DL (ref 298–536)
TRANSFERRIN SERPL-MCNC: 172 MG/DL (ref 200–360)
WBC # BLD AUTO: 8.53 10*3/MM3 (ref 3.4–10.8)

## 2020-08-19 PROCEDURE — 36415 COLL VENOUS BLD VENIPUNCTURE: CPT

## 2020-08-19 PROCEDURE — 80053 COMPREHEN METABOLIC PANEL: CPT

## 2020-08-19 PROCEDURE — 25010000002 EPOETIN ALFA-EPBX 40000 UNIT/ML SOLUTION: Performed by: NURSE PRACTITIONER

## 2020-08-19 PROCEDURE — 99214 OFFICE O/P EST MOD 30 MIN: CPT | Performed by: NURSE PRACTITIONER

## 2020-08-19 PROCEDURE — 82728 ASSAY OF FERRITIN: CPT

## 2020-08-19 PROCEDURE — 84466 ASSAY OF TRANSFERRIN: CPT

## 2020-08-19 PROCEDURE — 96372 THER/PROPH/DIAG INJ SC/IM: CPT

## 2020-08-19 PROCEDURE — 85025 COMPLETE CBC W/AUTO DIFF WBC: CPT

## 2020-08-19 PROCEDURE — 83540 ASSAY OF IRON: CPT

## 2020-08-19 RX ORDER — CARVEDILOL 6.25 MG/1
6.25 TABLET ORAL 2 TIMES DAILY WITH MEALS
COMMUNITY
End: 2021-04-06

## 2020-08-19 RX ADMIN — EPOETIN ALFA-EPBX 40000 UNITS: 40000 INJECTION, SOLUTION INTRAVENOUS; SUBCUTANEOUS at 14:09

## 2020-08-19 NOTE — PROGRESS NOTES
"[facility]  HEMATOLOGY & ONCOLOGY    [facility] ONC [facility] HEMATOLOGY AND ONCOLOGY  [address]  Located within [facility] [address] [phone]    Patient Name: Maria C Shrestha  Encounter Date: 08/19/2020  YOB: 1960  Patient Number: [id number]    Chief Complaint   Patient presents with   • Anemia     Here for f/u       REASON FOR VISIT:  Mrs. Maria C Shrestha is a 60-year-old female patient here today in follow-up for her anemia.  She has been followed by Dr. Huitron and receiving Procrit when she met guidelines.  She has a stage IV chronic kidney disease for which is followed by nephrology, Dr Davis.  She is also required iron infusions in the past with the last being on 7/17/2020 and 7/31/2020 with Injectafer.  On 7/15/2020 her hemoglobin was at 10 hematocrit 30.5 iron saturation was at 18 with a ferritin of 135.  Did undergo Procrit 40,000 units subcu that day.    She presents today in follow-up. She is fatigued and tired but relates this to her medical problems on top of the anemia.  Her hemoglobin has improved to 10.6 with hematocrit of 32.8.  She continues to meet guidelines for Procrit therapy today.  Her white count remains normal at 8.53 with a platelet count of 173,000.  CMP shows GFR to be at 16ml/min and glucose of 132, nonfasting.     PAST MEDICAL HISTORY:  ALLERGIES:  Allergies   Allergen Reactions   • Codeine Nausea Only       CURRENT MEDICATIONS:  Outpatient Encounter Medications as of 8/19/2020   Medication Sig Dispense Refill   • allopurinol (ZYLOPRIM) 100 MG tablet Take 100 mg by mouth 2 (Two) Times a Day.     • amLODIPine (NORVASC) 10 MG tablet Take 10 mg by mouth.     • aspirin 81 MG tablet Take 81 mg by mouth Daily. Stop 7/22/2018     • BD INSULIN SYRINGE U/F 31G X 5/16\" 1 ML misc AS DIRECTED FOUR TIMES A  each 0   • busPIRone (BUSPAR) 10 MG tablet buspirone 10 mg tablet   Take 2 tablets twice a day by oral " route as needed for 90 days.     • carvedilol (COREG) 6.25 MG tablet Take 6.25 mg by mouth 2 (Two) Times a Day With Meals.     • cetirizine (zyrTEC) 10 MG tablet Take 10 mg by mouth As Needed.     • Cholecalciferol (VITAMIN D3) 89495 units tablet Vitamin D2 50,000 unit capsule   Take 1 capsule every week by oral route. Sunday     • citalopram (CeleXA) 40 MG tablet Take 40 mg by mouth.     • diazePAM (VALIUM) 2 MG tablet Take 2 mg by mouth As Needed for Anxiety.     • Dulaglutide (Trulicity) 1.5 MG/0.5ML solution pen-injector Inject 1.5 mg under the skin into the appropriate area as directed Every 7 (Seven) Days. 6 mL 3   • Ergocalciferol (VITAMIN D2 PO) Take 1,250 mcg by mouth 1 (One) Time Per Week. VITAMIN D2 1250 MCG (50,000 UNIT) CAPSULE TAKE 1 CAPSULE EVERY WEEK BY ORAL ROUTE     • Evolocumab 140 MG/ML solution auto-injector Inject 1 mL under the skin into the appropriate area as directed Every 14 (Fourteen) Days for 24 doses. NDC [address]-40 6 mL 10   • fluticasone (VERAMYST) 27.5 MCG/SPRAY nasal spray 2 sprays into each nostril Daily.     • Glucose Blood (BLOOD GLUCOSE TEST) strip Use 4 x daily, use any brand covered by insurance or same brand as before 360 each 3   • ibuprofen (ADVIL,MOTRIN) 800 MG tablet Take 800 mg by mouth Every 8 (Eight) Hours As Needed for Mild Pain .     • insulin glargine (Lantus) 100 UNIT/ML injection INJECT 200 UNITS DAILY 180 mL 3   • Insulin Lispro (HUMALOG KWIKPEN) 200 UNIT/ML solution pen-injector Inject 80 Units under the skin into the appropriate area as directed 3 (Three) Times a Day With Meals. 36 pen 11   • Insulin Pen Needle (B-D ULTRAFINE III SHORT PEN) 31G X 8 MM misc USE AS DIRECTED TO INJECT FOUR TIMES DAILY 200 each 2   • lamoTRIgine (LaMICtal) 50 MG tablet dispersible disintegrating tablet Take 50 mg by mouth Every Night.     • Lancets (FREESTYLE) lancets FOUR TIMES A  each 11   • levothyroxine (Synthroid) 88 MCG tablet Take 1 tablet by mouth Daily. 30 tablet  11   • lisinopril (PRINIVIL,ZESTRIL) 40 MG tablet Take 40 mg by mouth 2 (Two) Times a Day.     • Melatonin 10 MG tablet Take  by mouth At Night As Needed.     • Multiple Vitamins-Minerals (MULTIVITAMIN ADULT PO) Take 1 tablet by mouth Daily.     • Omega-3 Fatty Acids (FISH OIL) 1000 MG capsule capsule Take 2,000 mg by mouth Daily With Dinner.     • potassium gluconate 595 (99 K) MG tablet tablet potassium gluconate 595 mg (99 mg) tablet   Take 1 tablets every day by oral route.     • rosuvastatin (CRESTOR) 10 MG tablet Take 10 mg by mouth Daily.     • sodium bicarbonate 650 MG tablet Take 650 mg by mouth 4 (Four) Times a Day.     • traMADol (ULTRAM) 50 MG tablet Take 1 tablet by mouth Every 6 (Six) Hours As Needed for Moderate Pain . 8 tablet 0   • [DISCONTINUED] carvedilol (COREG) 12.5 MG tablet        No facility-administered encounter medications on file as of 8/19/2020.      ADULT ILLNESSES:  Patient Active Problem List   Diagnosis Code   • Type 2 diabetes mellitus with stage 3 chronic kidney disease, with long-term current use of insulin (CMS/[facility]) E11.22, N18.3, Z79.4   • Mixed diabetic hyperlipidemia associated with type 2 diabetes mellitus (CMS/[facility]) E11.69, E78.2   • Hypertension associated with diabetes (CMS/[facility]) E11.59, I10   • Vitamin D deficiency E55.9   • B12 deficiency E53.8   • Anemia in CKD (chronic kidney disease) N18.9, D63.1   • Acquired hypothyroidism E03.9   • Chronic kidney disease, stage 4 (severe) (CMS/[facility]) N18.4   • Anemia D64.9   • Anxiety F41.9   • Deep venous thrombosis of lower extremity (CMS/[facility]) I82.409   • Disease of liver K76.9   • Embolism (CMS/[facility]) I74.9   • Hypertriglyceridemia E78.1   • Hypocalcemia E83.51   • Mood disorder (CMS/[facility]) F39   • Morbid obesity (CMS/[facility]) E66.01   • Sleep apnea G47.30   • Acute renal failure superimposed on stage 3 chronic kidney disease (CMS/[facility]) N17.9, N18.3   • Anemia of chronic renal failure N18.9, D63.1   • Hypertension I10   • Diabetes mellitus  (CMS/[facility]) E11.9   • Thyroid disease E07.9   • Hypothyroidism E03.9   • Type 2 DM with CKD stage 3 and hypertension (CMS/[facility]) E11.22, I12.9, N18.3   • Vitamin D deficiency E55.9   • Anemia due to stage 4 chronic kidney disease (CMS/[facility]) N18.4, D63.1     SURGERIES:  Past Surgical History:   Procedure Laterality Date   • ADENOIDECTOMY     • ANAL FISTULA REPAIR      x 2    • CHOLECYSTECTOMY     • COLONOSCOPY  01/02/2014   • COLONOSCOPY N/A 3/22/2017    Procedure: COLONOSCOPY WITH ANESTHESIA;  Surgeon: Mono Linder MD;  Location:  [facility];  Service:    • D&C HYSTEROSCOPY N/A 7/25/2018    Procedure: DILATATION AND CURETTAGE HYSTEROSCOPY;  Surgeon: Michael Castaneda MD;  Location: [facility] OR;  Service: Obstetrics/Gynecology   • DILATATION AND CURETTAGE      X 2   • ENDOSCOPY     • TONSILLECTOMY       HEALTH MAINTENANCE ITEMS:  Last Completed Colonoscopy       Status Date      COLONOSCOPY Done 3/22/2017 COLONOSCOPY     Colonoscopy on 3/22/2017 per Dr. Rich been found a 5 mm polyp in the proximal transverse colon, 6 mm polyp distal sigmoid colon.  There was inflamed because at the anus appeared to be an inflamed anal papilla improved compared to 2014.  Recommended repeat surveillance in 5 years.          Last Completed Mammogram       Status Date      MAMMOGRAM Done 8/12/2020 MAMMO SCREENING DIGITAL TOMOSYNTHESIS BILATERAL W CAD     Mammogram negative for malignancy          FAMILY HISTORY:  Family History   Problem Relation Age of Onset   • Diabetes Other    • Heart failure Other    • Cancer Other    • Kidney disease Other    • Cancer Mother    • Cancer Father    • Heart disease Father    • Diabetes Father    • Obesity Father    • Stroke Father    • Cancer Maternal Grandmother    • Uterine cancer Maternal Grandmother    • Diabetes Maternal Grandfather    • Cancer Paternal Grandmother    • Colon cancer Paternal Grandmother    • Diabetes Paternal Grandfather    • Heart disease Paternal Grandfather    • No Known  Problems Sister    • No Known Problems Brother    • No Known Problems Daughter    • No Known Problems Maternal Aunt    • No Known Problems Paternal Aunt    • BRCA 1/2 Neg Hx    • Breast cancer Neg Hx    • Endometrial cancer Neg Hx    • Ovarian cancer Neg Hx      SOCIAL HISTORY:  Social History     Socioeconomic History   • Marital status:      Spouse name: Not on file   • Number of children: Not on file   • Years of education: Not on file   • Highest education level: Not on file   Tobacco Use   • Smoking status: Never Smoker   • Smokeless tobacco: Never Used   Substance and Sexual Activity   • Alcohol use: No   • Drug use: No   • Sexual activity: Never     Birth control/protection: Post-menopausal       REVIEW OF SYSTEMS:  Review of Systems   Constitutional: Positive for fatigue. Negative for activity change, appetite change, chills, diaphoresis, fever and unexpected weight loss.   HENT: Negative for ear pain, nosebleeds, sinus pressure, sore throat and voice change.    Eyes: Negative for blurred vision, double vision, pain and visual disturbance.   Respiratory: Negative for cough and shortness of breath.    Cardiovascular: Negative for chest pain, palpitations and leg swelling.   Gastrointestinal: Negative for abdominal pain, anal bleeding, blood in stool, constipation, diarrhea, nausea and vomiting.   Endocrine: Negative for heat intolerance, polydipsia and polyuria.   Genitourinary: Negative for dysuria, frequency, hematuria, urgency and urinary incontinence.   Musculoskeletal: Negative for arthralgias and myalgias.   Skin: Negative for rash and skin lesions.   Neurological: Negative for dizziness, tremors, seizures, syncope, speech difficulty, weakness and headache.   Hematological: Negative for adenopathy. Does not bruise/bleed easily.   Psychiatric/Behavioral: Negative for dysphoric mood, sleep disturbance, suicidal ideas and depressed mood.       /68   Pulse 60   Temp 98.2 °F (36.8 °C)  "(Temporal)   Ht 166.4 cm (65.5\")   Wt (!) 173 kg (382 lb)   LMP  (LMP Unknown)   SpO2 97%   Breastfeeding No   BMI 62.60 kg/m²  Body surface area is 2.62 meters squared.  Pain Score    08/19/20 1312   PainSc: 0-No pain       Physical Exam:  Physical Exam   Constitutional: She is oriented to person, place, and time. She appears well-developed and well-nourished.   HENT:   Head: Atraumatic.   Mouth/Throat: Oropharynx is clear and moist.   Eyes: Pupils are equal, round, and reactive to light. EOM are normal. No scleral icterus.   Neck: Trachea normal. Neck supple. No JVD present.   Cardiovascular: Normal rate, regular rhythm and normal pulses. Exam reveals no gallop and no friction rub.   No murmur heard.  Pulmonary/Chest: Effort normal and breath sounds normal. She has no wheezes. She has no rhonchi. She has no rales. Chest wall is not dull to percussion.   Abdominal: Soft. Normal appearance. There is no tenderness. There is no rebound and no guarding.   Difficult to examine due to body habitus   Lymphadenopathy:     She has no cervical adenopathy.     She has no axillary adenopathy.        Right: No supraclavicular adenopathy present.        Left: No supraclavicular adenopathy present.   Neurological: She is alert and oriented to person, place, and time. She has normal strength. No sensory deficit.   Psychiatric: She has a normal mood and affect. Judgment normal.       Maria C Shrestha reports a pain score of 0.    Patient's Body mass index is 62.6 kg/m². BMI is above normal parameters. Recommendations include: defer to pcp.      LABS    Lab Results - Last 18 Months   Lab Units 08/19/20  1255 07/15/20  1255 07/15/20  1006 06/17/20  1241 05/20/20  1056 04/22/20  0816  03/25/20  1105   HEMOGLOBIN g/dL 10.6* 10.0* 10.7* 10.1* 10.9* 10.5*   < > 10.2*   HEMATOCRIT % 32.8* 30.5* 32.3* 31.2* 33.5* 32.3*   < > 31.3*   MCV fL 94.8 93.0 91.8 93.4 92.3 93.1   < > 91.8   WBC 10*3/mm3 8.53 10.15 10.20 8.64 9.96 8.28   < > 9.26 "   RDW % 16.6* 16.0* 15.9* 15.9* 15.6* 15.9*   < > 16.0*   MPV fL 10.4 10.3 9.2 10.3 10.2 10.3   < > 10.4   PLATELETS 10*3/mm3 173 218 230 229 235 204   < > 190   IMM GRAN % % 0.5 0.5  --  0.3 0.6* 0.6*  --  0.8*   NEUTROS ABS 10*3/mm3 6.06 7.55* 7.90* 6.27 7.20* 5.81   < > 7.27*   LYMPHS ABS 10*3/mm3 1.70 1.81 1.80 1.71 1.95 1.77   < > 1.36   MONOS ABS 10*3/mm3 0.48 0.48  --  0.44 0.49 0.40  --  0.32   EOS ABS 10*3/mm3 0.22 0.21  --  0.16 0.22 0.22  --  0.20   BASOS ABS 10*3/mm3 0.03 0.05  --  0.03 0.04 0.03  --  0.04   IMMATURE GRANS (ABS) 10*3/mm3 0.04 0.05  --  0.03 0.06* 0.05  --  0.07*   NRBC /100 WBC 0.0 0.0  --  0.0 0.0 0.0  --  0.0    < > = values in this interval not displayed.       Lab Results - Last 18 Months   Lab Units 08/19/20  1255 07/15/20  1255 07/15/20  1006 06/17/20  1241 05/20/20  1056 04/22/20  0816   GLUCOSE mg/dL 136* 190* 163* 158* 230* 215*   SODIUM mmol/L 142 139 138 142 141 143   POTASSIUM mmol/L 4.3 4.4 4.2 4.3 3.9 3.8   CO2 mmol/L 24.0 22.0 24.0 24.0 22.0 22.0   CHLORIDE mmol/L 106 105 105 106 106 107   ANION GAP mmol/L 12.0 12.0 9.0 12.0 13.0 14.0   CREATININE mg/dL 2.99* 2.93* 2.98* 2.64* 2.83* 2.77*   BUN mg/dL 33* 40* 38* 33* 35* 33*   BUN / CREAT RATIO  11.0 13.7 12.8 12.5 12.4 11.9   CALCIUM mg/dL 9.3 9.2 9.4 9.2 8.7 8.8   EGFR IF NONAFRICN AM mL/min/1.73 16* 16* 16* 18* 17* 17*   ALK PHOS U/L 58 62 67 63 74 76   TOTAL PROTEIN g/dL 6.7 6.7 7.1 6.9 6.7 6.5   ALT (SGPT) U/L 8 10 15 9 12 10   AST (SGOT) U/L 10 14 20 12 15 12   BILIRUBIN mg/dL 0.2 0.2 0.2 0.2 0.2 0.2   ALBUMIN g/dL 3.60 3.60 3.80 3.60 3.50 3.30*   GLOBULIN gm/dL 3.1 3.1 3.3 3.3 3.2 3.2     Lab Results - Last 18 Months   Lab Units 08/19/20  1255 07/15/20  1255 07/15/20  1006 04/22/20  0816 04/13/20  1038 01/08/20  0923 12/18/19  1137 09/04/19  0825 08/07/19  1308 05/06/19  0853 03/13/19  1246   IRON mcg/dL 83 56  --  72  --   --   --   --  77 68 57   TIBC mcg/dL 256* 308  --  288*  --   --   --   --  276 286 280   IRON  SATURATION % 32 18*  --  25  --   --   --   --  28 24 20   FERRITIN ng/mL 639.60* 135.70  --  135.10  --   --   --   --  90.10  --  72.60   TSH uIU/mL  --   --  3.010  --  7.250* 3.590 3.640 2.570  --  4.720*  --      Assessment/Plans:   **Anemia secondary to chronic kidney disease stage IV   · Hemoglobin at 10.6 with hematocrit 32.8, hemoglobin remaining stable with the Procrit therapy monthly.  On average hemoglobin is remaining above 10 at this dosing.  · GFR 16ml/min  · Iron saturation 32%, ferritin 639.6 status post Injectafer 7/15 and 7/31/2020   --We will continue to give Procrit 40,000 units subcu today and monthly when patient meets guidelines of hemoglobin less than 11 and hematocrit less than 33 and normal iron studies.  Again she does meet guidelines today and will receive the Procrit 40,000 units subcu.    ** Infection status:   · No current issues   **Metabolic / Renal:   --Chronic kidney disease stage IV with a GFR of 16ml/min, she is followed by Dr. Davis nephrologist closely.  She states they are watching closely and may discuss dialysis soon.  --Electrolytes are within normal range  ** Endocrine:   --Diabetes mellitus followed by Dr. Patel.  Patient's nonfasting glucose today is at 132.  He was encouraged to continue following with her endocrinologist.  --Hypothyroidism also managed by endocrinology with Synthroid  ** Coagulation / VTE prophylaxis:   - no current issues  ** GI:   -no current issues  ** Pulmonary:   -- no current issues  ** Cardiology:   -Hypertension controlled with medications.  ** Skin / Rash:   · No current issues   ** :   · No current issues   ** Neurologic:   · No current issues   ** Psychosocial:   · No current issues   ** Pain control:   - no current issue  ** Health Maintenance-as mentioned above    I spent 22 total minutes, face-to-face, caring for Maria C today.  Greater than 50% of this time involved counseling and/or coordination of care as documented within this  note regarding the patient's illness(es), pros and cons of various treatment options, instructions and/or risk reduction.    Sola Mallory, FLOR   08/19/2020  2:01 PM

## 2020-09-10 DIAGNOSIS — N18.4 ANEMIA DUE TO STAGE 4 CHRONIC KIDNEY DISEASE (HCC): Primary | ICD-10-CM

## 2020-09-10 DIAGNOSIS — D63.1 ANEMIA DUE TO STAGE 4 CHRONIC KIDNEY DISEASE (HCC): Primary | ICD-10-CM

## 2020-09-16 ENCOUNTER — LAB (OUTPATIENT)
Dept: LAB | Facility: HOSPITAL | Age: 60
End: 2020-09-16

## 2020-09-16 ENCOUNTER — OFFICE VISIT (OUTPATIENT)
Dept: ONCOLOGY | Facility: CLINIC | Age: 60
End: 2020-09-16

## 2020-09-16 ENCOUNTER — INFUSION (OUTPATIENT)
Dept: ONCOLOGY | Facility: HOSPITAL | Age: 60
End: 2020-09-16

## 2020-09-16 VITALS
DIASTOLIC BLOOD PRESSURE: 72 MMHG | HEIGHT: 66 IN | WEIGHT: 278.5 LBS | HEART RATE: 64 BPM | SYSTOLIC BLOOD PRESSURE: 138 MMHG | OXYGEN SATURATION: 98 % | BODY MASS INDEX: 44.76 KG/M2 | TEMPERATURE: 97.5 F | RESPIRATION RATE: 16 BRPM

## 2020-09-16 VITALS
DIASTOLIC BLOOD PRESSURE: 65 MMHG | RESPIRATION RATE: 18 BRPM | HEIGHT: 66 IN | HEART RATE: 62 BPM | OXYGEN SATURATION: 98 % | BODY MASS INDEX: 47.09 KG/M2 | WEIGHT: 293 LBS | TEMPERATURE: 98 F | SYSTOLIC BLOOD PRESSURE: 151 MMHG

## 2020-09-16 DIAGNOSIS — N18.4 ANEMIA DUE TO STAGE 4 CHRONIC KIDNEY DISEASE (HCC): ICD-10-CM

## 2020-09-16 DIAGNOSIS — D63.1 ANEMIA DUE TO STAGE 4 CHRONIC KIDNEY DISEASE (HCC): Primary | ICD-10-CM

## 2020-09-16 DIAGNOSIS — D63.1 ANEMIA DUE TO STAGE 4 CHRONIC KIDNEY DISEASE (HCC): ICD-10-CM

## 2020-09-16 DIAGNOSIS — N18.4 CHRONIC KIDNEY DISEASE, STAGE 4 (SEVERE) (HCC): Primary | ICD-10-CM

## 2020-09-16 DIAGNOSIS — N18.4 STAGE 4 CHRONIC KIDNEY DISEASE (HCC): Primary | ICD-10-CM

## 2020-09-16 DIAGNOSIS — N18.4 CHRONIC KIDNEY DISEASE, STAGE 4 (SEVERE) (HCC): ICD-10-CM

## 2020-09-16 DIAGNOSIS — N18.4 ANEMIA DUE TO STAGE 4 CHRONIC KIDNEY DISEASE (HCC): Primary | ICD-10-CM

## 2020-09-16 LAB
ALBUMIN SERPL-MCNC: 3.8 G/DL (ref 3.5–5.2)
ALBUMIN/GLOB SERPL: 1.3 G/DL
ALP SERPL-CCNC: 66 U/L (ref 39–117)
ALT SERPL W P-5'-P-CCNC: 9 U/L (ref 1–33)
ANION GAP SERPL CALCULATED.3IONS-SCNC: 11 MMOL/L (ref 5–15)
AST SERPL-CCNC: 9 U/L (ref 1–32)
BASOPHILS # BLD AUTO: 0.04 10*3/MM3 (ref 0–0.2)
BASOPHILS NFR BLD AUTO: 0.4 % (ref 0–1.5)
BILIRUB SERPL-MCNC: 0.2 MG/DL (ref 0–1.2)
BUN SERPL-MCNC: 50 MG/DL (ref 8–23)
BUN/CREAT SERPL: 17.4 (ref 7–25)
CALCIUM SPEC-SCNC: 9.4 MG/DL (ref 8.6–10.5)
CHLORIDE SERPL-SCNC: 109 MMOL/L (ref 98–107)
CO2 SERPL-SCNC: 20 MMOL/L (ref 22–29)
CREAT SERPL-MCNC: 2.87 MG/DL (ref 0.57–1)
DEPRECATED RDW RBC AUTO: 54.6 FL (ref 37–54)
EOSINOPHIL # BLD AUTO: 0.23 10*3/MM3 (ref 0–0.4)
EOSINOPHIL NFR BLD AUTO: 2.5 % (ref 0.3–6.2)
ERYTHROCYTE [DISTWIDTH] IN BLOOD BY AUTOMATED COUNT: 16 % (ref 12.3–15.4)
GFR SERPL CREATININE-BSD FRML MDRD: 17 ML/MIN/1.73
GLOBULIN UR ELPH-MCNC: 2.9 GM/DL
GLUCOSE SERPL-MCNC: 164 MG/DL (ref 65–99)
HCT VFR BLD AUTO: 32.9 % (ref 34–46.6)
HGB BLD-MCNC: 10.7 G/DL (ref 12–15.9)
HOLD SPECIMEN: NORMAL
HOLD SPECIMEN: NORMAL
IMM GRANULOCYTES # BLD AUTO: 0.04 10*3/MM3 (ref 0–0.05)
IMM GRANULOCYTES NFR BLD AUTO: 0.4 % (ref 0–0.5)
LYMPHOCYTES # BLD AUTO: 1.99 10*3/MM3 (ref 0.7–3.1)
LYMPHOCYTES NFR BLD AUTO: 21.8 % (ref 19.6–45.3)
MCH RBC QN AUTO: 30.6 PG (ref 26.6–33)
MCHC RBC AUTO-ENTMCNC: 32.5 G/DL (ref 31.5–35.7)
MCV RBC AUTO: 94 FL (ref 79–97)
MONOCYTES # BLD AUTO: 0.47 10*3/MM3 (ref 0.1–0.9)
MONOCYTES NFR BLD AUTO: 5.1 % (ref 5–12)
NEUTROPHILS NFR BLD AUTO: 6.36 10*3/MM3 (ref 1.7–7)
NEUTROPHILS NFR BLD AUTO: 69.8 % (ref 42.7–76)
NRBC BLD AUTO-RTO: 0 /100 WBC (ref 0–0.2)
PLATELET # BLD AUTO: 197 10*3/MM3 (ref 140–450)
PMV BLD AUTO: 10.2 FL (ref 6–12)
POTASSIUM SERPL-SCNC: 4.6 MMOL/L (ref 3.5–5.2)
PROT SERPL-MCNC: 6.7 G/DL (ref 6–8.5)
RBC # BLD AUTO: 3.5 10*6/MM3 (ref 3.77–5.28)
SODIUM SERPL-SCNC: 140 MMOL/L (ref 136–145)
WBC # BLD AUTO: 9.13 10*3/MM3 (ref 3.4–10.8)

## 2020-09-16 PROCEDURE — 36415 COLL VENOUS BLD VENIPUNCTURE: CPT

## 2020-09-16 PROCEDURE — 99213 OFFICE O/P EST LOW 20 MIN: CPT | Performed by: NURSE PRACTITIONER

## 2020-09-16 PROCEDURE — 96372 THER/PROPH/DIAG INJ SC/IM: CPT

## 2020-09-16 PROCEDURE — 25010000002 EPOETIN ALFA-EPBX 40000 UNIT/ML SOLUTION: Performed by: NURSE PRACTITIONER

## 2020-09-16 PROCEDURE — 85025 COMPLETE CBC W/AUTO DIFF WBC: CPT

## 2020-09-16 PROCEDURE — 80053 COMPREHEN METABOLIC PANEL: CPT

## 2020-09-16 RX ADMIN — EPOETIN ALFA-EPBX 40000 UNITS: 40000 INJECTION, SOLUTION INTRAVENOUS; SUBCUTANEOUS at 14:15

## 2020-09-16 NOTE — PROGRESS NOTES
"[facility]  HEMATOLOGY & ONCOLOGY    [facility] [place] [facility] HEMATOLOGY AND ONCOLOGY  [address]  [facility] [address] [phone]    Patient Name: Maria C Shrestha  Encounter Date: 08/19/2020  YOB: 1960  Patient Number: [id number]    Chief Complaint   Patient presents with   • Anemia     Here for f/u       REASON FOR VISIT:  Mrs. Maria C Shrestha is a 60-year-old female patient here today in follow-up for her anemia.  She has been followed by Dr. Huitron and receiving Procrit when she met guidelines.  She has a stage IV chronic kidney disease for which is followed by nephrology, Dr Davis.  She is also required iron infusions in the past with the last being on 7/17/2020 and 7/31/2020 with Injectafer.  Her last Procrit was on 8/19/2020 for hgb of 10.6.    She presents today in follow-up. She is fatigued and tired but relates this to her medical problems on top of the anemia.  Her hemoglobin is at 10.7 with hematocrit of 32.9.  She continues to meet guidelines for Procrit therapy today.  Her white count remains normal at 9.13 with a platelet count of 197,000.  CMP shows her GFR to be stable at 17ml/min and glucose of 164, nonfasting.     PAST MEDICAL HISTORY:  ALLERGIES:  Allergies   Allergen Reactions   • Codeine Nausea Only       CURRENT MEDICATIONS:  Outpatient Encounter Medications as of 9/16/2020   Medication Sig Dispense Refill   • allopurinol (ZYLOPRIM) 100 MG tablet Take 100 mg by mouth 2 (Two) Times a Day.     • amLODIPine (NORVASC) 10 MG tablet Take 10 mg by mouth.     • aspirin 81 MG tablet Take 81 mg by mouth Daily. Stop 7/22/2018     • BD INSULIN SYRINGE U/F 31G X 5/16\" 1 ML misc AS DIRECTED FOUR TIMES A  each 0   • busPIRone (BUSPAR) 10 MG tablet buspirone 10 mg tablet   Take 2 tablets twice a day by oral route as needed for 90 days.     • carvedilol (COREG) 6.25 MG tablet Take 6.25 mg by mouth 2 (Two) Times a " Day With Meals.     • cetirizine (zyrTEC) 10 MG tablet Take 10 mg by mouth As Needed.     • Cholecalciferol (VITAMIN D3) 47704 units tablet Vitamin D2 50,000 unit capsule   Take 1 capsule every week by oral route. Sunday     • citalopram (CeleXA) 40 MG tablet Take 40 mg by mouth.     • diazePAM (VALIUM) 2 MG tablet Take 2 mg by mouth As Needed for Anxiety.     • Dulaglutide (Trulicity) 1.5 MG/0.5ML solution pen-injector Inject 1.5 mg under the skin into the appropriate area as directed Every 7 (Seven) Days. 6 mL 3   • Ergocalciferol (VITAMIN D2 PO) Take 1,250 mcg by mouth 1 (One) Time Per Week. VITAMIN D2 1250 MCG (50,000 UNIT) CAPSULE TAKE 1 CAPSULE EVERY WEEK BY ORAL ROUTE     • Evolocumab 140 MG/ML solution auto-injector Inject 1 mL under the skin into the appropriate area as directed Every 14 (Fourteen) Days for 24 doses. NDC [address]-47 6 mL 10   • fluticasone (VERAMYST) 27.5 MCG/SPRAY nasal spray 2 sprays into each nostril Daily.     • Glucose Blood (BLOOD GLUCOSE TEST) strip Use 4 x daily, use any brand covered by insurance or same brand as before 360 each 3   • ibuprofen (ADVIL,MOTRIN) 800 MG tablet Take 800 mg by mouth Every 8 (Eight) Hours As Needed for Mild Pain .     • Insulin Lispro (HUMALOG KWIKPEN) 200 UNIT/ML solution pen-injector Inject 80 Units under the skin into the appropriate area as directed 3 (Three) Times a Day With Meals. 36 pen 11   • Insulin Pen Needle (B-D ULTRAFINE III SHORT PEN) 31G X 8 MM misc USE AS DIRECTED TO INJECT FOUR TIMES DAILY 200 each 2   • lamoTRIgine (LaMICtal) 50 MG tablet dispersible disintegrating tablet Take 50 mg by mouth Every Night.     • Lancets (FREESTYLE) lancets FOUR TIMES A  each 11   • levothyroxine (Synthroid) 88 MCG tablet Take 1 tablet by mouth Daily. 30 tablet 11   • lisinopril (PRINIVIL,ZESTRIL) 40 MG tablet Take 40 mg by mouth 2 (Two) Times a Day.     • Melatonin 10 MG tablet Take  by mouth At Night As Needed.     • Multiple Vitamins-Minerals  (MULTIVITAMIN ADULT PO) Take 1 tablet by mouth Daily.     • Omega-3 Fatty Acids (FISH OIL) 1000 MG capsule capsule Take 2,000 mg by mouth Daily With Dinner.     • potassium gluconate 595 (99 K) MG tablet tablet potassium gluconate 595 mg (99 mg) tablet   Take 1 tablets every day by oral route.     • rosuvastatin (CRESTOR) 10 MG tablet Take 10 mg by mouth Daily.     • sodium bicarbonate 650 MG tablet Take 650 mg by mouth 4 (Four) Times a Day.     • traMADol (ULTRAM) 50 MG tablet Take 1 tablet by mouth Every 6 (Six) Hours As Needed for Moderate Pain . 8 tablet 0   • insulin glargine (Lantus) 100 UNIT/ML injection INJECT 200 UNITS DAILY 180 mL 3     No facility-administered encounter medications on file as of 9/16/2020.      ADULT ILLNESSES:  Patient Active Problem List   Diagnosis Code   • Type 2 diabetes mellitus with stage 3 chronic kidney disease, with long-term current use of insulin (CMS/[facility]) E11.22, N18.3, Z79.4   • Mixed diabetic hyperlipidemia associated with type 2 diabetes mellitus (CMS/[facility]) E11.69, E78.2   • Hypertension associated with diabetes (CMS/[facility]) E11.59, I10   • Vitamin D deficiency E55.9   • B12 deficiency E53.8   • Anemia in CKD (chronic kidney disease) N18.9, D63.1   • Acquired hypothyroidism E03.9   • Chronic kidney disease, stage 4 (severe) (CMS/[facility]) N18.4   • Anemia D64.9   • Anxiety F41.9   • Deep venous thrombosis of lower extremity (CMS/[facility]) I82.409   • Disease of liver K76.9   • Embolism (CMS/[facility]) I74.9   • Hypertriglyceridemia E78.1   • Hypocalcemia E83.51   • Mood disorder (CMS/[facility]) F39   • Morbid obesity (CMS/[facility]) E66.01   • Sleep apnea G47.30   • Acute renal failure superimposed on stage 3 chronic kidney disease (CMS/[facility]) N17.9, N18.3   • Anemia of chronic renal failure N18.9, D63.1   • Hypertension I10   • Diabetes mellitus (CMS/[facility]) E11.9   • Thyroid disease E07.9   • Hypothyroidism E03.9   • Type 2 DM with CKD stage 3 and hypertension (CMS/[facility]) E11.22, I12.9, N18.3   • Vitamin D  deficiency E55.9   • Anemia due to stage 4 chronic kidney disease (CMS/HCC) N18.4, D63.1     SURGERIES:  Past Surgical History:   Procedure Laterality Date   • ADENOIDECTOMY     • ANAL FISTULA REPAIR      x 2    • CHOLECYSTECTOMY     • COLONOSCOPY  01/02/2014   • COLONOSCOPY N/A 3/22/2017    Procedure: COLONOSCOPY WITH ANESTHESIA;  Surgeon: Mono Linder MD;  Location: [facility] ENDOSCOPY;  Service:    • D&C HYSTEROSCOPY N/A 7/25/2018    Procedure: DILATATION AND CURETTAGE HYSTEROSCOPY;  Surgeon: Michael Castaneda MD;  Location: [facility] OR;  Service: Obstetrics/Gynecology   • DILATATION AND CURETTAGE      X 2   • ENDOSCOPY     • TONSILLECTOMY       HEALTH MAINTENANCE ITEMS:  Last Completed Colonoscopy       Status Date      COLONOSCOPY Done 3/22/2017 COLONOSCOPY     Colonoscopy on 3/22/2017 per Dr. Rich been found a 5 mm polyp in the proximal transverse colon, 6 mm polyp distal sigmoid colon.  There was inflamed because at the anus appeared to be an inflamed anal papilla improved compared to 2014.  Recommended repeat surveillance in 5 years.          Last Completed Mammogram       Status Date      MAMMOGRAM Done 8/12/2020 MAMMO SCREENING DIGITAL TOMOSYNTHESIS BILATERAL W CAD     Mammogram negative for malignancy          FAMILY HISTORY:  Family History   Problem Relation Age of Onset   • Diabetes Other    • Heart failure Other    • Cancer Other    • Kidney disease Other    • Cancer Mother    • Cancer Father    • Heart disease Father    • Diabetes Father    • Obesity Father    • Stroke Father    • Cancer Maternal Grandmother    • Uterine cancer Maternal Grandmother    • Diabetes Maternal Grandfather    • Cancer Paternal Grandmother    • Colon cancer Paternal Grandmother    • Diabetes Paternal Grandfather    • Heart disease Paternal Grandfather    • No Known Problems Sister    • No Known Problems Brother    • No Known Problems Daughter    • No Known Problems Maternal Aunt    • No Known Problems Paternal Aunt    •  "BRCA 1/2 Neg Hx    • Breast cancer Neg Hx    • Endometrial cancer Neg Hx    • Ovarian cancer Neg Hx      SOCIAL HISTORY:  Social History     Socioeconomic History   • Marital status:      Spouse name: Not on file   • Number of children: Not on file   • Years of education: Not on file   • Highest education level: Not on file   Tobacco Use   • Smoking status: Never Smoker   • Smokeless tobacco: Never Used   Substance and Sexual Activity   • Alcohol use: No   • Drug use: No   • Sexual activity: Never     Birth control/protection: Post-menopausal       REVIEW OF SYSTEMS:  Review of Systems   Constitutional: Positive for fatigue. Negative for activity change, appetite change, chills, diaphoresis, fever and unexpected weight loss.   HENT: Negative for ear pain, nosebleeds, sinus pressure, sore throat and voice change.    Eyes: Negative for blurred vision, double vision, pain and visual disturbance.   Respiratory: Negative for cough and shortness of breath.    Cardiovascular: Negative for chest pain, palpitations and leg swelling.   Gastrointestinal: Negative for abdominal pain, anal bleeding, blood in stool, constipation, diarrhea, nausea and vomiting.   Endocrine: Negative for heat intolerance, polydipsia and polyuria.   Genitourinary: Negative for dysuria, frequency, hematuria, urgency and urinary incontinence.   Musculoskeletal: Negative for arthralgias and myalgias.   Skin: Negative for rash and skin lesions.   Neurological: Negative for dizziness, tremors, seizures, syncope, speech difficulty, weakness and headache.   Hematological: Negative for adenopathy. Does not bruise/bleed easily.   Psychiatric/Behavioral: Negative for dysphoric mood, sleep disturbance, suicidal ideas and depressed mood.       /72   Pulse 64   Temp 97.5 °F (36.4 °C) (Temporal)   Resp 16   Ht 166.4 cm (65.5\")   Wt 126 kg (278 lb 8 oz)   LMP  (LMP Unknown)   SpO2 98%   Breastfeeding No   BMI 45.64 kg/m²  Body surface area is " 2.29 meters squared.  Pain Score    09/16/20 1310   PainSc: 0-No pain       Physical Exam:  Physical Exam   Constitutional: She is oriented to person, place, and time. She appears well-developed and well-nourished.   HENT:   Head: Atraumatic.   Mouth/Throat: Oropharynx is clear and moist.   Eyes: Pupils are equal, round, and reactive to light. No scleral icterus.   Neck: Trachea normal. Neck supple. No JVD present.   Cardiovascular: Normal rate, regular rhythm and normal pulses. Exam reveals no gallop and no friction rub.   No murmur heard.  Pulmonary/Chest: Effort normal and breath sounds normal. She has no wheezes. She has no rhonchi. She has no rales. Chest wall is not dull to percussion.   Abdominal: Soft. Normal appearance. There is no abdominal tenderness. There is no rebound and no guarding.   Difficult to examine due to body habitus   Lymphadenopathy:     She has no cervical adenopathy.        Right: No supraclavicular adenopathy present.        Left: No supraclavicular adenopathy present.   Neurological: She is alert and oriented to person, place, and time. No sensory deficit.   Psychiatric: Judgment normal.       Maria C Shrestha reports a pain score of .    Patient's Body mass index is 45.64 kg/m². BMI is above normal parameters. Recommendations include: defer to pcp.      LABS    Lab Results - Last 18 Months   Lab Units 09/16/20  1244 08/19/20  1255 07/15/20  1255 07/15/20  1006 06/17/20  1241 05/20/20  1056 04/22/20  0816   HEMOGLOBIN g/dL 10.7* 10.6* 10.0* 10.7* 10.1* 10.9* 10.5*   HEMATOCRIT % 32.9* 32.8* 30.5* 32.3* 31.2* 33.5* 32.3*   MCV fL 94.0 94.8 93.0 91.8 93.4 92.3 93.1   WBC 10*3/mm3 9.13 8.53 10.15 10.20 8.64 9.96 8.28   RDW % 16.0* 16.6* 16.0* 15.9* 15.9* 15.6* 15.9*   MPV fL 10.2 10.4 10.3 9.2 10.3 10.2 10.3   PLATELETS 10*3/mm3 197 173 218 230 229 235 204   IMM GRAN % % 0.4 0.5 0.5  --  0.3 0.6* 0.6*   NEUTROS ABS 10*3/mm3 6.36 6.06 7.55* 7.90* 6.27 7.20* 5.81   LYMPHS ABS 10*3/mm3 1.99 1.70  1.81 1.80 1.71 1.95 1.77   MONOS ABS 10*3/mm3 0.47 0.48 0.48  --  0.44 0.49 0.40   EOS ABS 10*3/mm3 0.23 0.22 0.21  --  0.16 0.22 0.22   BASOS ABS 10*3/mm3 0.04 0.03 0.05  --  0.03 0.04 0.03   IMMATURE GRANS (ABS) 10*3/mm3 0.04 0.04 0.05  --  0.03 0.06* 0.05   NRBC /100 WBC 0.0 0.0 0.0  --  0.0 0.0 0.0       Lab Results - Last 18 Months   Lab Units 09/16/20  1244 08/19/20  1255 07/15/20  1255 07/15/20  1006 06/17/20  1241 05/20/20  1056   GLUCOSE mg/dL 164* 136* 190* 163* 158* 230*   SODIUM mmol/L 140 142 139 138 142 141   POTASSIUM mmol/L 4.6 4.3 4.4 4.2 4.3 3.9   CO2 mmol/L 20.0* 24.0 22.0 24.0 24.0 22.0   CHLORIDE mmol/L 109* 106 105 105 106 106   ANION GAP mmol/L 11.0 12.0 12.0 9.0 12.0 13.0   CREATININE mg/dL 2.87* 2.99* 2.93* 2.98* 2.64* 2.83*   BUN mg/dL 50* 33* 40* 38* 33* 35*   BUN / CREAT RATIO  17.4 11.0 13.7 12.8 12.5 12.4   CALCIUM mg/dL 9.4 9.3 9.2 9.4 9.2 8.7   EGFR IF NONAFRICN AM mL/min/1.73 17* 16* 16* 16* 18* 17*   ALK PHOS U/L 66 58 62 67 63 74   TOTAL PROTEIN g/dL 6.7 6.7 6.7 7.1 6.9 6.7   ALT (SGPT) U/L 9 8 10 15 9 12   AST (SGOT) U/L 9 10 14 20 12 15   BILIRUBIN mg/dL 0.2 0.2 0.2 0.2 0.2 0.2   ALBUMIN g/dL 3.80 3.60 3.60 3.80 3.60 3.50   GLOBULIN gm/dL 2.9 3.1 3.1 3.3 3.3 3.2     Lab Results - Last 18 Months   Lab Units 08/19/20  1255 07/15/20  1255 07/15/20  1006 04/22/20  0816 04/13/20  1038 01/08/20  0923 12/18/19  1137 09/04/19  0825 08/07/19  1308 05/06/19  0853   IRON mcg/dL 83 56  --  72  --   --   --   --  77 68   TIBC mcg/dL 256* 308  --  288*  --   --   --   --  276 286   IRON SATURATION % 32 18*  --  25  --   --   --   --  28 24   FERRITIN ng/mL 639.60* 135.70  --  135.10  --   --   --   --  90.10  --    TSH uIU/mL  --   --  3.010  --  7.250* 3.590 3.640 2.570  --  4.720*     Assessment/Plans:   **Anemia secondary to chronic kidney disease stage IV   · Hemoglobin at 10.7 with hematocrit 32.9, hemoglobin remaining stable with the Procrit therapy monthly.  On average hemoglobin is  remaining above 10 at this dosing; meets guidelines for procrit today.  · GFR 17ml/min  · Iron saturation 32%, ferritin 639.6 status post Injectafer 7/15 and 7/31/2020   --We will continue to give Procrit 40,000 units subcu today and monthly when patient meets guidelines of hemoglobin less than 11 and hematocrit less than 33 and normal iron studies.  Again she does meet guidelines today and will receive the Procrit 40,000 units subcu.    ** Infection status:   · No current issues   **Metabolic / Renal:   --Chronic kidney disease stage IV with a GFR of 17ml/min, she is followed by Dr. Davis nephrologist closely.  --Electrolytes are within normal range  ** Endocrine:   --Diabetes mellitus followed by Dr. Patel.  Patient's nonfasting glucose today is at 164.  Shee was encouraged to continue following with her endocrinologist.  --Hypothyroidism also managed by endocrinology with Synthroid  ** Coagulation / VTE prophylaxis:   - no current issues  ** GI:   -no current issues  ** Pulmonary:   -- no current issues  ** Cardiology:   -Hypertension controlled with medications. BP today at 151/65  ** Skin / Rash:   · No current issues   ** :   · No current issues   ** Neurologic:   · No current issues   ** Psychosocial:   · No current issues   ** Pain control:   - no current issue  ** Health Maintenance-as mentioned above    I spent 22 total minutes, face-to-face, caring for Maria C today.  Greater than 50% of this time involved counseling and/or coordination of care as documented within this note regarding the patient's illness(es), pros and cons of various treatment options, instructions and/or risk reduction.    Sola Mallory, FLOR   09/16/2020  12:05 CDT

## 2020-10-13 DIAGNOSIS — D63.1 ANEMIA DUE TO STAGE 4 CHRONIC KIDNEY DISEASE (HCC): ICD-10-CM

## 2020-10-13 DIAGNOSIS — N18.4 ANEMIA DUE TO STAGE 4 CHRONIC KIDNEY DISEASE (HCC): ICD-10-CM

## 2020-10-13 DIAGNOSIS — D63.1 ANEMIA DUE TO STAGE 4 CHRONIC KIDNEY DISEASE (HCC): Primary | ICD-10-CM

## 2020-10-13 DIAGNOSIS — N18.4 ANEMIA DUE TO STAGE 4 CHRONIC KIDNEY DISEASE (HCC): Primary | ICD-10-CM

## 2020-10-13 DIAGNOSIS — N18.4 STAGE 4 CHRONIC KIDNEY DISEASE (HCC): Primary | ICD-10-CM

## 2020-10-14 ENCOUNTER — INFUSION (OUTPATIENT)
Dept: ONCOLOGY | Facility: HOSPITAL | Age: 60
End: 2020-10-14

## 2020-10-14 ENCOUNTER — OFFICE VISIT (OUTPATIENT)
Dept: ONCOLOGY | Facility: CLINIC | Age: 60
End: 2020-10-14

## 2020-10-14 ENCOUNTER — LAB (OUTPATIENT)
Dept: LAB | Facility: HOSPITAL | Age: 60
End: 2020-10-14

## 2020-10-14 VITALS
RESPIRATION RATE: 18 BRPM | WEIGHT: 293 LBS | HEIGHT: 65 IN | OXYGEN SATURATION: 98 % | TEMPERATURE: 97.8 F | SYSTOLIC BLOOD PRESSURE: 139 MMHG | HEART RATE: 71 BPM | DIASTOLIC BLOOD PRESSURE: 63 MMHG | BODY MASS INDEX: 48.82 KG/M2

## 2020-10-14 VITALS
HEART RATE: 76 BPM | WEIGHT: 293 LBS | OXYGEN SATURATION: 98 % | RESPIRATION RATE: 16 BRPM | HEIGHT: 65 IN | BODY MASS INDEX: 48.82 KG/M2 | TEMPERATURE: 98 F | SYSTOLIC BLOOD PRESSURE: 130 MMHG | DIASTOLIC BLOOD PRESSURE: 80 MMHG

## 2020-10-14 DIAGNOSIS — N18.4 STAGE 4 CHRONIC KIDNEY DISEASE (HCC): ICD-10-CM

## 2020-10-14 DIAGNOSIS — N18.4 ANEMIA DUE TO STAGE 4 CHRONIC KIDNEY DISEASE (HCC): ICD-10-CM

## 2020-10-14 DIAGNOSIS — N18.4 STAGE 4 CHRONIC KIDNEY DISEASE (HCC): Primary | ICD-10-CM

## 2020-10-14 DIAGNOSIS — N18.4 ANEMIA OF CHRONIC RENAL FAILURE, STAGE 4 (SEVERE) (HCC): ICD-10-CM

## 2020-10-14 DIAGNOSIS — D63.1 ANEMIA OF CHRONIC RENAL FAILURE, STAGE 4 (SEVERE) (HCC): ICD-10-CM

## 2020-10-14 DIAGNOSIS — D63.1 ANEMIA DUE TO STAGE 4 CHRONIC KIDNEY DISEASE (HCC): ICD-10-CM

## 2020-10-14 LAB
25(OH)D3 SERPL-MCNC: 41.8 NG/ML (ref 30–100)
ALBUMIN SERPL-MCNC: 3.5 G/DL (ref 3.5–5)
ALBUMIN/GLOB SERPL: 1.1 G/DL (ref 1.1–2.5)
ALP SERPL-CCNC: 78 U/L (ref 24–120)
ALT SERPL W P-5'-P-CCNC: 12 U/L (ref 0–35)
ANION GAP SERPL CALCULATED.3IONS-SCNC: 5 MMOL/L (ref 4–13)
AST SERPL-CCNC: 18 U/L (ref 7–45)
AUTO MIXED CELLS #: 0.4 10*3/MM3 (ref 0.1–2.6)
AUTO MIXED CELLS %: 5.1 % (ref 0.1–24)
BILIRUB SERPL-MCNC: 0.4 MG/DL (ref 0.1–1)
BUN SERPL-MCNC: 51 MG/DL (ref 5–21)
BUN/CREAT SERPL: 17.8
CALCIUM SPEC-SCNC: 8.8 MG/DL (ref 8.4–10.4)
CHLORIDE SERPL-SCNC: 107 MMOL/L (ref 98–110)
CHOLEST SERPL-MCNC: 132 MG/DL (ref 130–200)
CO2 SERPL-SCNC: 23 MMOL/L (ref 24–31)
CREAT SERPL-MCNC: 2.86 MG/DL (ref 0.5–1.4)
ERYTHROCYTE [DISTWIDTH] IN BLOOD BY AUTOMATED COUNT: 15.4 % (ref 12.3–15.4)
FERRITIN SERPL-MCNC: 405.7 NG/ML (ref 13–150)
GFR SERPL CREATININE-BSD FRML MDRD: 17 ML/MIN/1.73
GLOBULIN UR ELPH-MCNC: 3.1 GM/DL
GLUCOSE SERPL-MCNC: 207 MG/DL (ref 70–100)
HBA1C MFR BLD: 6.6 % (ref 4.8–5.9)
HCT VFR BLD AUTO: 32.2 % (ref 34–46.6)
HDLC SERPL-MCNC: 42 MG/DL
HGB BLD-MCNC: 10.7 G/DL (ref 12–15.9)
HOLD SPECIMEN: NORMAL
HOLD SPECIMEN: NORMAL
IRON 24H UR-MRATE: 90 MCG/DL (ref 37–145)
IRON SATN MFR SERPL: 33 % (ref 20–50)
LDLC SERPL CALC-MCNC: 58 MG/DL (ref 0–99)
LDLC/HDLC SERPL: 1.2 {RATIO}
LYMPHOCYTES # BLD AUTO: 1.4 10*3/MM3 (ref 0.7–3.1)
LYMPHOCYTES NFR BLD AUTO: 17.7 % (ref 19.6–45.3)
MCH RBC QN AUTO: 30.9 PG (ref 26.6–33)
MCHC RBC AUTO-ENTMCNC: 33.2 G/DL (ref 31.5–35.7)
MCV RBC AUTO: 93.1 FL (ref 79–97)
NEUTROPHILS NFR BLD AUTO: 6.1 10*3/MM3 (ref 1.7–7)
NEUTROPHILS NFR BLD AUTO: 77.2 % (ref 42.7–76)
PLATELET # BLD AUTO: 190 10*3/MM3 (ref 140–450)
PMV BLD AUTO: 9.5 FL (ref 6–12)
POTASSIUM SERPL-SCNC: 4.7 MMOL/L (ref 3.5–5.3)
PROT SERPL-MCNC: 6.6 G/DL (ref 6.3–8.7)
RBC # BLD AUTO: 3.46 10*6/MM3 (ref 3.77–5.28)
SODIUM SERPL-SCNC: 135 MMOL/L (ref 135–145)
TIBC SERPL-MCNC: 273 MCG/DL (ref 298–536)
TRANSFERRIN SERPL-MCNC: 183 MG/DL (ref 200–360)
TRIGL SERPL-MCNC: 197 MG/DL (ref 0–149)
TSH SERPL DL<=0.05 MIU/L-ACNC: 2.65 UIU/ML (ref 0.27–4.2)
VIT B12 BLD-MCNC: 448 PG/ML (ref 211–946)
VLDLC SERPL-MCNC: 32 MG/DL (ref 5–40)
WBC # BLD AUTO: 7.9 10*3/MM3 (ref 3.4–10.8)

## 2020-10-14 PROCEDURE — 82043 UR ALBUMIN QUANTITATIVE: CPT | Performed by: INTERNAL MEDICINE

## 2020-10-14 PROCEDURE — 82570 ASSAY OF URINE CREATININE: CPT | Performed by: INTERNAL MEDICINE

## 2020-10-14 PROCEDURE — 80061 LIPID PANEL: CPT | Performed by: INTERNAL MEDICINE

## 2020-10-14 PROCEDURE — 83036 HEMOGLOBIN GLYCOSYLATED A1C: CPT | Performed by: INTERNAL MEDICINE

## 2020-10-14 PROCEDURE — 82306 VITAMIN D 25 HYDROXY: CPT | Performed by: INTERNAL MEDICINE

## 2020-10-14 PROCEDURE — 84443 ASSAY THYROID STIM HORMONE: CPT | Performed by: INTERNAL MEDICINE

## 2020-10-14 PROCEDURE — 99213 OFFICE O/P EST LOW 20 MIN: CPT | Performed by: NURSE PRACTITIONER

## 2020-10-14 PROCEDURE — 83540 ASSAY OF IRON: CPT | Performed by: NURSE PRACTITIONER

## 2020-10-14 PROCEDURE — 85025 COMPLETE CBC W/AUTO DIFF WBC: CPT

## 2020-10-14 PROCEDURE — 82728 ASSAY OF FERRITIN: CPT | Performed by: NURSE PRACTITIONER

## 2020-10-14 PROCEDURE — 84466 ASSAY OF TRANSFERRIN: CPT | Performed by: NURSE PRACTITIONER

## 2020-10-14 PROCEDURE — 25010000002 EPOETIN ALFA-EPBX 40000 UNIT/ML SOLUTION: Performed by: NURSE PRACTITIONER

## 2020-10-14 PROCEDURE — 80053 COMPREHEN METABOLIC PANEL: CPT

## 2020-10-14 PROCEDURE — 96372 THER/PROPH/DIAG INJ SC/IM: CPT

## 2020-10-14 PROCEDURE — 82607 VITAMIN B-12: CPT | Performed by: INTERNAL MEDICINE

## 2020-10-14 PROCEDURE — 36415 COLL VENOUS BLD VENIPUNCTURE: CPT | Performed by: INTERNAL MEDICINE

## 2020-10-14 RX ADMIN — EPOETIN ALFA-EPBX 40000 UNITS: 40000 INJECTION, SOLUTION INTRAVENOUS; SUBCUTANEOUS at 16:04

## 2020-10-14 NOTE — PROGRESS NOTES
"[facility]  HEMATOLOGY & ONCOLOGY    [facility] ONC [facility] HEMATOLOGY AND ONCOLOGY  [address]  [place] [place] [facility] [address] [phone]    Patient Name: Maria C Shrestha  Encounter Date: 10/14/2020  YOB: 1960  Patient Number: [id number]    Chief Complaint   Patient presents with   • Anemia     Here for f/u       REASON FOR VISIT:  Mrs. Maria C Shrestha is a 60-year-old female patient here today in follow-up for her anemia.  She has been followed by Dr. Huitron and receiving Procrit when she met guidelines.  She has a stage IV chronic kidney disease for which is followed by nephrology, Dr Davis.  She is also required iron infusions in the past with the last being on 7/17/2020 and 7/31/2020 with Injectafer.  Her last Procrit was on 9/16/2020 for hgb of 10.7.    She presents today in follow-up. She is fatigued and tired but it has improved some.  Her hemoglobin is at 10.7 again today with hematocrit of 32.2.  She continues to meet guidelines for Procrit therapy today.  Her white count remains normal at 7.90 with a platelet count of 190,000.  CMP shows her GFR to be stable at 17ml/min and glucose of 207, nonfasting.     PAST MEDICAL HISTORY:  ALLERGIES:  Allergies   Allergen Reactions   • Codeine Nausea Only       CURRENT MEDICATIONS:  Outpatient Encounter Medications as of 10/14/2020   Medication Sig Dispense Refill   • allopurinol (ZYLOPRIM) 100 MG tablet Take 100 mg by mouth 2 (Two) Times a Day.     • amLODIPine (NORVASC) 10 MG tablet Take 10 mg by mouth.     • aspirin 81 MG tablet Take 81 mg by mouth Daily. Stop 7/22/2018     • BD INSULIN SYRINGE U/F 31G X 5/16\" 1 ML misc AS DIRECTED FOUR TIMES A  each 0   • busPIRone (BUSPAR) 10 MG tablet buspirone 10 mg tablet   Take 2 tablets twice a day by oral route as needed for 90 days.     • carvedilol (COREG) 6.25 MG tablet Take 6.25 mg by mouth 2 (Two) Times a Day With Meals.     • " cetirizine (zyrTEC) 10 MG tablet Take 10 mg by mouth As Needed.     • Cholecalciferol (VITAMIN D3) 22941 units tablet Vitamin D2 50,000 unit capsule   Take 1 capsule every week by oral route. Sunday     • citalopram (CeleXA) 40 MG tablet Take 40 mg by mouth.     • diazePAM (VALIUM) 2 MG tablet Take 2 mg by mouth As Needed for Anxiety.     • Dulaglutide (Trulicity) 1.5 MG/0.5ML solution pen-injector Inject 1.5 mg under the skin into the appropriate area as directed Every 7 (Seven) Days. 6 mL 3   • Ergocalciferol (VITAMIN D2 PO) Take 1,250 mcg by mouth 1 (One) Time Per Week. VITAMIN D2 1250 MCG (50,000 UNIT) CAPSULE TAKE 1 CAPSULE EVERY WEEK BY ORAL ROUTE     • Evolocumab 140 MG/ML solution auto-injector Inject 1 mL under the skin into the appropriate area as directed Every 14 (Fourteen) Days for 24 doses. NDC [address]-80 6 mL 10   • fluticasone (VERAMYST) 27.5 MCG/SPRAY nasal spray 2 sprays into each nostril Daily.     • Glucose Blood (BLOOD GLUCOSE TEST) strip Use 4 x daily, use any brand covered by insurance or same brand as before 360 each 3   • ibuprofen (ADVIL,MOTRIN) 800 MG tablet Take 800 mg by mouth Every 8 (Eight) Hours As Needed for Mild Pain .     • insulin glargine (Lantus) 100 UNIT/ML injection INJECT 200 UNITS DAILY 180 mL 3   • Insulin Lispro (HUMALOG KWIKPEN) 200 UNIT/ML solution pen-injector Inject 80 Units under the skin into the appropriate area as directed 3 (Three) Times a Day With Meals. 36 pen 11   • Insulin Pen Needle (B-D ULTRAFINE III SHORT PEN) 31G X 8 MM misc USE AS DIRECTED TO INJECT FOUR TIMES DAILY 200 each 2   • lamoTRIgine (LaMICtal) 50 MG tablet dispersible disintegrating tablet Take 50 mg by mouth Every Night.     • Lancets (FREESTYLE) lancets FOUR TIMES A  each 11   • levothyroxine (Synthroid) 88 MCG tablet Take 1 tablet by mouth Daily. 30 tablet 11   • lisinopril (PRINIVIL,ZESTRIL) 40 MG tablet Take 40 mg by mouth 2 (Two) Times a Day.     • Melatonin 10 MG tablet Take  by  mouth At Night As Needed.     • Multiple Vitamins-Minerals (MULTIVITAMIN ADULT PO) Take 1 tablet by mouth Daily.     • Omega-3 Fatty Acids (FISH OIL) 1000 MG capsule capsule Take 2,000 mg by mouth Daily With Dinner.     • potassium gluconate 595 (99 K) MG tablet tablet potassium gluconate 595 mg (99 mg) tablet   Take 1 tablets every day by oral route.     • rosuvastatin (CRESTOR) 10 MG tablet Take 10 mg by mouth Daily.     • sodium bicarbonate 650 MG tablet Take 650 mg by mouth 4 (Four) Times a Day.     • traMADol (ULTRAM) 50 MG tablet Take 1 tablet by mouth Every 6 (Six) Hours As Needed for Moderate Pain . 8 tablet 0     No facility-administered encounter medications on file as of 10/14/2020.      ADULT ILLNESSES:  Patient Active Problem List   Diagnosis Code   • Type 2 diabetes mellitus with stage 3 chronic kidney disease, with long-term current use of insulin (CMS/[facility]) E11.22, N18.30, Z79.4   • Mixed diabetic hyperlipidemia associated with type 2 diabetes mellitus (CMS/[facility]) E11.69, E78.2   • Hypertension associated with diabetes (CMS/[facility]) E11.59, I10   • Vitamin D deficiency E55.9   • B12 deficiency E53.8   • Anemia in CKD (chronic kidney disease) N18.9, D63.1   • Acquired hypothyroidism E03.9   • Chronic kidney disease, stage 4 (severe) (CMS/[facility]) N18.4   • Anemia D64.9   • Anxiety F41.9   • Deep venous thrombosis of lower extremity (CMS/[facility]) I82.409   • Disease of liver K76.9   • Embolism (CMS/[facility]) I74.9   • Hypertriglyceridemia E78.1   • Hypocalcemia E83.51   • Mood disorder (CMS/[facility]) F39   • Morbid obesity (CMS/[facility]) E66.01   • Sleep apnea G47.30   • Acute renal failure superimposed on stage 3 chronic kidney disease (CMS/[facility]) N17.9, N18.30   • Anemia of chronic renal failure N18.9, D63.1   • Hypertension I10   • Diabetes mellitus (CMS/[facility]) E11.9   • Thyroid disease E07.9   • Hypothyroidism E03.9   • Type 2 DM with CKD stage 3 and hypertension (CMS/[facility]) E11.22, I12.9, N18.30   • Vitamin D deficiency E55.9   •  Anemia due to stage 4 chronic kidney disease (CMS/HCC) N18.4, D63.1   • Stage 4 chronic kidney disease (CMS/HCC) N18.4     SURGERIES:  Past Surgical History:   Procedure Laterality Date   • ADENOIDECTOMY     • ANAL FISTULA REPAIR      x 2    • CHOLECYSTECTOMY     • COLONOSCOPY  01/02/2014   • COLONOSCOPY N/A 3/22/2017    Procedure: COLONOSCOPY WITH ANESTHESIA;  Surgeon: Mono Linder MD;  Location:  [facility];  Service:    • D&C HYSTEROSCOPY N/A 7/25/2018    Procedure: DILATATION AND CURETTAGE HYSTEROSCOPY;  Surgeon: Michael Castaneda MD;  Location: [facility] OR;  Service: Obstetrics/Gynecology   • DILATATION AND CURETTAGE      X 2   • ENDOSCOPY     • TONSILLECTOMY       HEALTH MAINTENANCE ITEMS:  Last Completed Colonoscopy       Status Date      COLONOSCOPY Done 3/22/2017 COLONOSCOPY     Colonoscopy on 3/22/2017 per Dr. Rich been found a 5 mm polyp in the proximal transverse colon, 6 mm polyp distal sigmoid colon.  There was inflamed because at the anus appeared to be an inflamed anal papilla improved compared to 2014.  Recommended repeat surveillance in 5 years.          Last Completed Mammogram       Status Date      MAMMOGRAM Done 8/12/2020 MAMMO SCREENING DIGITAL TOMOSYNTHESIS BILATERAL W CAD     Mammogram negative for malignancy          FAMILY HISTORY:  Family History   Problem Relation Age of Onset   • Diabetes Other    • Heart failure Other    • Cancer Other    • Kidney disease Other    • Cancer Mother    • Cancer Father    • Heart disease Father    • Diabetes Father    • Obesity Father    • Stroke Father    • Cancer Maternal Grandmother    • Uterine cancer Maternal Grandmother    • Diabetes Maternal Grandfather    • Cancer Paternal Grandmother    • Colon cancer Paternal Grandmother    • Diabetes Paternal Grandfather    • Heart disease Paternal Grandfather    • No Known Problems Sister    • No Known Problems Brother    • No Known Problems Daughter    • No Known Problems Maternal Aunt    • No Known  "Problems Paternal Aunt    • BRCA 1/2 Neg Hx    • Breast cancer Neg Hx    • Endometrial cancer Neg Hx    • Ovarian cancer Neg Hx      SOCIAL HISTORY:  Social History     Socioeconomic History   • Marital status:      Spouse name: Not on file   • Number of children: Not on file   • Years of education: Not on file   • Highest education level: Not on file   Tobacco Use   • Smoking status: Never Smoker   • Smokeless tobacco: Never Used   Substance and Sexual Activity   • Alcohol use: No   • Drug use: No   • Sexual activity: Never     Birth control/protection: Post-menopausal       REVIEW OF SYSTEMS:  Review of systems unchanged from last visit:  Review of Systems   Constitutional: Positive for fatigue. Negative for activity change, appetite change, chills, diaphoresis, fever and unexpected weight loss.   HENT: Negative for ear pain, nosebleeds, sinus pressure, sore throat and voice change.    Eyes: Negative for blurred vision, double vision, pain and visual disturbance.   Respiratory: Negative for cough and shortness of breath.    Cardiovascular: Negative for chest pain, palpitations and leg swelling.   Gastrointestinal: Negative for abdominal pain, anal bleeding, blood in stool, constipation, diarrhea, nausea and vomiting.   Endocrine: Negative for heat intolerance, polydipsia and polyuria.   Genitourinary: Negative for dysuria, frequency, hematuria, urgency and urinary incontinence.   Musculoskeletal: Negative for arthralgias and myalgias.   Skin: Negative for rash and skin lesions.   Neurological: Negative for dizziness, tremors, seizures, syncope, speech difficulty, weakness and headache.   Hematological: Negative for adenopathy. Does not bruise/bleed easily.   Psychiatric/Behavioral: Negative for dysphoric mood, sleep disturbance, suicidal ideas and depressed mood.       /80   Pulse 76   Temp 98 °F (36.7 °C) (Temporal)   Resp 16   Ht 165.1 cm (65\")   Wt (!) 175 kg (386 lb)   LMP  (LMP Unknown)   " SpO2 98%   Breastfeeding No   BMI 64.23 kg/m²  Body surface area is 2.62 meters squared.  Pain Score    10/14/20 1441   PainSc: 0-No pain       Physical Exam:  Physical exam unchanged from last visit:  Physical Exam   Constitutional: She is oriented to person, place, and time. She appears well-developed and well-nourished.   HENT:   Head: Atraumatic.   Mouth/Throat: Oropharynx is clear and moist.   Eyes: Pupils are equal, round, and reactive to light. No scleral icterus.   Neck: Trachea normal. Neck supple. No JVD present.   Cardiovascular: Normal rate, regular rhythm and normal pulses. Exam reveals no gallop and no friction rub.   No murmur heard.  Pulmonary/Chest: Effort normal and breath sounds normal. She has no wheezes. She has no rhonchi. She has no rales. Chest wall is not dull to percussion.   Abdominal: Soft. Normal appearance. There is no abdominal tenderness. There is no rebound and no guarding.   Difficult to examine due to body habitus   Lymphadenopathy:     She has no cervical adenopathy.        Right: No supraclavicular adenopathy present.        Left: No supraclavicular adenopathy present.   Neurological: She is alert and oriented to person, place, and time. No sensory deficit.   Psychiatric: Judgment normal.       Maria C Shrestha reports a pain score of   Pain Score    10/14/20 1441   PainSc: 0-No pain     .    Patient's Body mass index is 64.23 kg/m². BMI is above normal parameters. Recommendations include: defer to pcp.      LABS    Lab Results - Last 18 Months   Lab Units 10/14/20  1023 09/16/20  1244 08/19/20  1255 07/15/20  1255 07/15/20  1006 06/17/20  1241 05/20/20  1056 04/22/20  0816   HEMOGLOBIN g/dL 10.7* 10.7* 10.6* 10.0* 10.7* 10.1* 10.9* 10.5*   HEMATOCRIT % 32.2* 32.9* 32.8* 30.5* 32.3* 31.2* 33.5* 32.3*   MCV fL 93.1 94.0 94.8 93.0 91.8 93.4 92.3 93.1   WBC 10*3/mm3 7.90 9.13 8.53 10.15 10.20 8.64 9.96 8.28   RDW % 15.4 16.0* 16.6* 16.0* 15.9* 15.9* 15.6* 15.9*   MPV fL 9.5 10.2 10.4  10.3 9.2 10.3 10.2 10.3   PLATELETS 10*3/mm3 190 197 173 218 230 229 235 204   IMM GRAN % %  --  0.4 0.5 0.5  --  0.3 0.6* 0.6*   NEUTROS ABS 10*3/mm3 6.10 6.36 6.06 7.55* 7.90* 6.27 7.20* 5.81   LYMPHS ABS 10*3/mm3 1.40 1.99 1.70 1.81 1.80 1.71 1.95 1.77   MONOS ABS 10*3/mm3  --  0.47 0.48 0.48  --  0.44 0.49 0.40   EOS ABS 10*3/mm3  --  0.23 0.22 0.21  --  0.16 0.22 0.22   BASOS ABS 10*3/mm3  --  0.04 0.03 0.05  --  0.03 0.04 0.03   IMMATURE GRANS (ABS) 10*3/mm3  --  0.04 0.04 0.05  --  0.03 0.06* 0.05   NRBC /100 WBC  --  0.0 0.0 0.0  --  0.0 0.0 0.0       Lab Results - Last 18 Months   Lab Units 10/14/20  1023 09/16/20  1244 08/19/20  1255 07/15/20  1255 07/15/20  1006 06/17/20  1241   GLUCOSE mg/dL 207* 164* 136* 190* 163* 158*   SODIUM mmol/L 135 140 142 139 138 142   POTASSIUM mmol/L 4.7 4.6 4.3 4.4 4.2 4.3   CO2 mmol/L 23.0* 20.0* 24.0 22.0 24.0 24.0   CHLORIDE mmol/L 107 109* 106 105 105 106   ANION GAP mmol/L 5.0 11.0 12.0 12.0 9.0 12.0   CREATININE mg/dL 2.86* 2.87* 2.99* 2.93* 2.98* 2.64*   BUN mg/dL 51* 50* 33* 40* 38* 33*   BUN / CREAT RATIO  17.8 17.4 11.0 13.7 12.8 12.5   CALCIUM mg/dL 8.8 9.4 9.3 9.2 9.4 9.2   EGFR IF NONAFRICN AM mL/min/1.73 17* 17* 16* 16* 16* 18*   ALK PHOS U/L 78 66 58 62 67 63   TOTAL PROTEIN g/dL 6.6 6.7 6.7 6.7 7.1 6.9   ALT (SGPT) U/L 12 9 8 10 15 9   AST (SGOT) U/L 18 9 10 14 20 12   BILIRUBIN mg/dL 0.4 0.2 0.2 0.2 0.2 0.2   ALBUMIN g/dL 3.50 3.80 3.60 3.60 3.80 3.60   GLOBULIN gm/dL 3.1 2.9 3.1 3.1 3.3 3.3     Lab Results - Last 18 Months   Lab Units 10/14/20  1023 08/19/20  1255 07/15/20  1255 07/15/20  1006 04/22/20  0816 04/13/20  1038 01/08/20  0923 12/18/19  1137 09/04/19  0825 08/07/19  1308 05/06/19  0853   IRON mcg/dL 90 83 56  --  72  --   --   --   --  77 68   TIBC mcg/dL 273* 256* 308  --  288*  --   --   --   --  276 286   IRON SATURATION % 33 32 18*  --  25  --   --   --   --  28 24   FERRITIN ng/mL 405.70* 639.60* 135.70  --  135.10  --   --   --   --  90.10   --    TSH uIU/mL  --   --   --  3.010  --  7.250* 3.590 3.640 2.570  --  4.720*     Assessment/Plans:   **Anemia secondary to chronic kidney disease stage IV   · Hemoglobin at 10.7 with hematocrit 32.2, hemoglobin remaining stable with the Procrit therapy monthly.  On average hemoglobin is remaining above 10 at this dosing; meets guidelines for procrit today.  · GFR 17ml/min  · Iron saturation 32%, ferritin 639.6 status post Injectafer 7/15 and 7/31/2020   --We will continue to give Procrit 40,000 units subcu today and monthly when patient meets guidelines of hemoglobin less than 11 and hematocrit less than 33 and normal iron studies.  Again she does meet guidelines today and will receive the Procrit 40,000 units subcu.    ** Infection status:   · No current issues   **Metabolic / Renal:   --Chronic kidney disease stage IV with a GFR of 17ml/min, she is followed by Dr. Davis nephrologist closely.   ** Endocrine:   --Diabetes mellitus followed by Dr. Patel.  Patient's nonfasting glucose today is at 207.  Her hgbA1c has improved to 6.6. She was encouraged to continue following with her endocrinologist.  --Hypothyroidism also managed by endocrinology with Synthroid. TSH at 2.650.   ** Coagulation / VTE prophylaxis:   - no current issues  ** GI:   -no current issues  ** Pulmonary:   -- no current issues  ** Cardiology:   -Hypertension controlled with medications. BP today at 151/65  ** Skin / Rash:   · No current issues   ** :   · No current issues   ** Neurologic:   · No current issues   ** Psychosocial:   · No current issues   ** Pain control:   - no current issue  ** Health Maintenance-as mentioned above    I spent 20 total minutes, face-to-face, caring for Maria C today.  Greater than 50% of this time involved counseling and/or coordination of care as documented within this note regarding the patient's illness(es), pros and cons of various treatment options, instructions and/or risk reduction.    Sola SANDOVAL  [place], [place]   10/14/2020

## 2020-10-15 LAB
ALBUMIN UR-MCNC: 161.7 MG/DL
CREAT UR-MCNC: 49 MG/DL
MICROALBUMIN/CREAT UR: 3300 MG/G

## 2020-10-21 ENCOUNTER — TELEMEDICINE (OUTPATIENT)
Dept: ENDOCRINOLOGY | Facility: CLINIC | Age: 60
End: 2020-10-21

## 2020-10-21 DIAGNOSIS — E78.2 MIXED DIABETIC HYPERLIPIDEMIA ASSOCIATED WITH TYPE 2 DIABETES MELLITUS (HCC): ICD-10-CM

## 2020-10-21 DIAGNOSIS — E11.22 TYPE 2 DIABETES MELLITUS WITH STAGE 4 CHRONIC KIDNEY DISEASE, WITH LONG-TERM CURRENT USE OF INSULIN (HCC): Primary | ICD-10-CM

## 2020-10-21 DIAGNOSIS — E11.59 HYPERTENSION ASSOCIATED WITH DIABETES (HCC): ICD-10-CM

## 2020-10-21 DIAGNOSIS — N25.0 RENAL OSTEODYSTROPHY: ICD-10-CM

## 2020-10-21 DIAGNOSIS — Z79.4 TYPE 2 DIABETES MELLITUS WITH STAGE 4 CHRONIC KIDNEY DISEASE, WITH LONG-TERM CURRENT USE OF INSULIN (HCC): Primary | ICD-10-CM

## 2020-10-21 DIAGNOSIS — E53.8 B12 DEFICIENCY: ICD-10-CM

## 2020-10-21 DIAGNOSIS — E11.69 MIXED DIABETIC HYPERLIPIDEMIA ASSOCIATED WITH TYPE 2 DIABETES MELLITUS (HCC): ICD-10-CM

## 2020-10-21 DIAGNOSIS — E55.9 VITAMIN D DEFICIENCY: ICD-10-CM

## 2020-10-21 DIAGNOSIS — I15.2 HYPERTENSION ASSOCIATED WITH DIABETES (HCC): ICD-10-CM

## 2020-10-21 DIAGNOSIS — N18.4 TYPE 2 DIABETES MELLITUS WITH STAGE 4 CHRONIC KIDNEY DISEASE, WITH LONG-TERM CURRENT USE OF INSULIN (HCC): Primary | ICD-10-CM

## 2020-10-21 PROCEDURE — 99214 OFFICE O/P EST MOD 30 MIN: CPT | Performed by: INTERNAL MEDICINE

## 2020-10-21 RX ORDER — INSULIN LISPRO 200 [IU]/ML
80 INJECTION, SOLUTION SUBCUTANEOUS
Qty: 24 PEN | Refills: 11 | Status: SHIPPED | OUTPATIENT
Start: 2020-10-21 | End: 2020-10-21 | Stop reason: SDUPTHER

## 2020-10-21 RX ORDER — LANCETS 28 GAUGE
EACH MISCELLANEOUS
Qty: 150 EACH | Refills: 11 | Status: SHIPPED | OUTPATIENT
Start: 2020-10-21 | End: 2020-10-21 | Stop reason: SDUPTHER

## 2020-10-21 RX ORDER — PEN NEEDLE, DIABETIC 31 GX5/16"
NEEDLE, DISPOSABLE MISCELLANEOUS
Qty: 200 EACH | Refills: 2 | Status: ON HOLD | OUTPATIENT
Start: 2020-10-21 | End: 2022-11-28

## 2020-10-21 RX ORDER — LANCETS 28 GAUGE
EACH MISCELLANEOUS
Qty: 150 EACH | Refills: 11 | Status: ON HOLD | OUTPATIENT
Start: 2020-10-21 | End: 2022-11-28

## 2020-10-21 RX ORDER — INSULIN GLARGINE 100 [IU]/ML
INJECTION, SOLUTION SUBCUTANEOUS
Qty: 60 ML | Refills: 11 | Status: SHIPPED | OUTPATIENT
Start: 2020-10-21 | End: 2020-10-21 | Stop reason: SDUPTHER

## 2020-10-21 RX ORDER — PEN NEEDLE, DIABETIC 31 GX5/16"
NEEDLE, DISPOSABLE MISCELLANEOUS
Qty: 200 EACH | Refills: 2 | Status: SHIPPED | OUTPATIENT
Start: 2020-10-21 | End: 2020-10-21 | Stop reason: SDUPTHER

## 2020-10-21 RX ORDER — INSULIN LISPRO 200 [IU]/ML
80 INJECTION, SOLUTION SUBCUTANEOUS
Qty: 24 PEN | Refills: 11 | Status: SHIPPED | OUTPATIENT
Start: 2020-10-21 | End: 2022-01-25 | Stop reason: SDUPTHER

## 2020-10-21 RX ORDER — INSULIN GLARGINE 100 [IU]/ML
INJECTION, SOLUTION SUBCUTANEOUS
Qty: 60 ML | Refills: 11 | Status: SHIPPED | OUTPATIENT
Start: 2020-10-21 | End: 2021-10-12 | Stop reason: SDUPTHER

## 2020-10-21 NOTE — PROGRESS NOTES
Maria C Shrestha is a 60 y.o. female who presents for  evaluation of   Type 2 diabetes                                       This was a Telehealth Encounter. Benefits and Disadvantages of a Telehealth Visit were discussed and accepted by patient. .  Patient agreed to receive service through Telehealth visit as patient is being compliant with social distancing recommendations imparted by CDC.     You have chosen to receive care through a telehealth visit.  Do you consent to use a video/audio connection for your medical care today? Yes        Primary Care / Referring Provider  Ole Soliman MD    History of Present Illness  Duration/Timing:  Diabetes mellitus type 2  timing constant       severity high     Severity (Complications/Hospitalizations)  Secondary Microvascular Complications:  Diabetic Nephropathy, No Diabetic Neuropathy    Macrovascular  , Carotid Artery Disease      Context  Diabetes Regimen:  Insulin, Compliant with regimen  Blood Glucose Readings    Running elevated, admitted to stress eating     Diet    counts carbs, eating only 45 g cho per meal     Exercise:  Does not exercise    Associated Signs/Symptoms  Hyperglycemic Symptoms:  No polyuria, No polydipsia, No polyphagia, Weight loss with keto diet before but not now   Hypoglycemic Episodes:  No documented hypoglycemia    Past Medical History:   Diagnosis Date   • Acquired hypothyroidism 2/6/2017   • Allergic    • Anemia    • Anemia due to stage 4 chronic kidney disease (CMS/[facility]) 8/18/2020   • Anxiety    • Arthritis    • Cellulitis    • Chronic kidney disease     3rd stage   • Depression    • Disease of thyroid gland    • Dyslipidemia    • Elevated cholesterol    • Essential hypertension    • Fatigue    • Gallbladder abscess    • Hearing loss    • Light headed    • Limited range of motion (ROM) of shoulder     right   • Obesity    • Palpitation    • Short of breath on exertion    • Sleep apnea    • Stage 4 chronic kidney disease (CMS/[facility]) 9/16/2020  "  • Type 2 diabetes mellitus (CMS/HCC)    • Type II diabetes mellitus, uncontrolled (CMS/HCC)    • Wears glasses      Family History   Problem Relation Age of Onset   • Diabetes Other    • Heart failure Other    • Cancer Other    • Kidney disease Other    • Cancer Mother    • Cancer Father    • Heart disease Father    • Diabetes Father    • Obesity Father    • Stroke Father    • Cancer Maternal Grandmother    • Uterine cancer Maternal Grandmother    • Diabetes Maternal Grandfather    • Cancer Paternal Grandmother    • Colon cancer Paternal Grandmother    • Diabetes Paternal Grandfather    • Heart disease Paternal Grandfather    • No Known Problems Sister    • No Known Problems Brother    • No Known Problems Daughter    • No Known Problems Maternal Aunt    • No Known Problems Paternal Aunt    • BRCA 1/2 Neg Hx    • Breast cancer Neg Hx    • Endometrial cancer Neg Hx    • Ovarian cancer Neg Hx      Social History     Tobacco Use   • Smoking status: Never Smoker   • Smokeless tobacco: Never Used   Substance Use Topics   • Alcohol use: No   • Drug use: No         Current Outpatient Medications:   •  allopurinol (ZYLOPRIM) 100 MG tablet, Take 100 mg by mouth 2 (Two) Times a Day., Disp: , Rfl:   •  amLODIPine (NORVASC) 10 MG tablet, Take 10 mg by mouth., Disp: , Rfl:   •  aspirin 81 MG tablet, Take 81 mg by mouth Daily. Stop 7/22/2018, Disp: , Rfl:   •  BD INSULIN SYRINGE U/F 31G X 5/16\" 1 ML misc, AS DIRECTED FOUR TIMES A DAY, Disp: 200 each, Rfl: 0  •  busPIRone (BUSPAR) 10 MG tablet, buspirone 10 mg tablet  Take 2 tablets twice a day by oral route as needed for 90 days., Disp: , Rfl:   •  carvedilol (COREG) 6.25 MG tablet, Take 6.25 mg by mouth 2 (Two) Times a Day With Meals., Disp: , Rfl:   •  cetirizine (zyrTEC) 10 MG tablet, Take 10 mg by mouth As Needed., Disp: , Rfl:   •  Cholecalciferol (VITAMIN D3) 33074 units tablet, Vitamin D2 50,000 unit capsule  Take 1 capsule every week by oral route. Sunday, Disp: , Rfl: "   •  citalopram (CeleXA) 40 MG tablet, Take 40 mg by mouth., Disp: , Rfl:   •  diazePAM (VALIUM) 2 MG tablet, Take 2 mg by mouth As Needed for Anxiety., Disp: , Rfl:   •  Dulaglutide (Trulicity) 1.5 MG/0.5ML solution pen-injector, Inject 1.5 mg under the skin into the appropriate area as directed Every 7 (Seven) Days., Disp: 6 mL, Rfl: 3  •  Ergocalciferol (VITAMIN D2 PO), Take 1,250 mcg by mouth 1 (One) Time Per Week. VITAMIN D2 1250 MCG (50,000 UNIT) CAPSULE TAKE 1 CAPSULE EVERY WEEK BY ORAL ROUTE, Disp: , Rfl:   •  Evolocumab 140 MG/ML solution auto-injector, Inject 1 mL under the skin into the appropriate area as directed Every 14 (Fourteen) Days for 24 doses. NDC [address]-49, Disp: 6 mL, Rfl: 10  •  fluticasone (VERAMYST) 27.5 MCG/SPRAY nasal spray, 2 sprays into each nostril Daily., Disp: , Rfl:   •  Glucose Blood (BLOOD GLUCOSE TEST) strip, Use 4 x daily, use any brand covered by insurance or same brand as before, Disp: 360 each, Rfl: 3  •  ibuprofen (ADVIL,MOTRIN) 800 MG tablet, Take 800 mg by mouth Every 8 (Eight) Hours As Needed for Mild Pain ., Disp: , Rfl:   •  insulin glargine (Lantus) 100 UNIT/ML injection, INJECT 200 UNITS DAILY, Disp: 180 mL, Rfl: 3  •  Insulin Lispro (HUMALOG KWIKPEN) 200 UNIT/ML solution pen-injector, Inject 80 Units under the skin into the appropriate area as directed 3 (Three) Times a Day With Meals., Disp: 36 pen, Rfl: 11  •  Insulin Pen Needle (B-D ULTRAFINE III SHORT PEN) 31G X 8 MM misc, USE AS DIRECTED TO INJECT FOUR TIMES DAILY, Disp: 200 each, Rfl: 2  •  lamoTRIgine (LaMICtal) 50 MG tablet dispersible disintegrating tablet, Take 50 mg by mouth Every Night., Disp: , Rfl:   •  Lancets (FREESTYLE) lancets, FOUR TIMES A DAY, Disp: 150 each, Rfl: 11  •  levothyroxine (Synthroid) 88 MCG tablet, Take 1 tablet by mouth Daily., Disp: 30 tablet, Rfl: 11  •  lisinopril (PRINIVIL,ZESTRIL) 40 MG tablet, Take 40 mg by mouth 2 (Two) Times a Day., Disp: , Rfl:   •  Melatonin 10 MG  tablet, Take  by mouth At Night As Needed., Disp: , Rfl:   •  Multiple Vitamins-Minerals (MULTIVITAMIN ADULT PO), Take 1 tablet by mouth Daily., Disp: , Rfl:   •  Omega-3 Fatty Acids (FISH OIL) 1000 MG capsule capsule, Take 2,000 mg by mouth Daily With Dinner., Disp: , Rfl:   •  potassium gluconate 595 (99 K) MG tablet tablet, potassium gluconate 595 mg (99 mg) tablet  Take 1 tablets every day by oral route., Disp: , Rfl:   •  rosuvastatin (CRESTOR) 10 MG tablet, Take 10 mg by mouth Daily., Disp: , Rfl:   •  sodium bicarbonate 650 MG tablet, Take 650 mg by mouth 4 (Four) Times a Day., Disp: , Rfl:   •  traMADol (ULTRAM) 50 MG tablet, Take 1 tablet by mouth Every 6 (Six) Hours As Needed for Moderate Pain ., Disp: 8 tablet, Rfl: 0    Review of Systems    Review of Systems   Constitutional: Positive for unexpected weight change. Negative for activity change, appetite change, chills, diaphoresis, fatigue and fever.   HENT: Negative for congestion, drooling, ear discharge, ear pain, facial swelling, mouth sores, nosebleeds, postnasal drip, sinus pressure, sneezing, sore throat, tinnitus, trouble swallowing and voice change.    Eyes: Negative.  Negative for photophobia, pain, discharge, redness and itching.   Respiratory: Negative.  Negative for apnea, cough, choking, chest tightness, shortness of breath, wheezing and stridor.    Cardiovascular: Negative.  Negative for chest pain, palpitations and leg swelling.   Gastrointestinal: Negative.  Negative for abdominal distention, abdominal pain, constipation, diarrhea, nausea and vomiting.   Endocrine: Positive for polydipsia, polyphagia and polyuria. Negative for cold intolerance and heat intolerance.   Genitourinary: Negative for difficulty urinating, dysuria, flank pain and frequency.   Musculoskeletal: Positive for arthralgias, back pain and myalgias. Negative for gait problem, joint swelling, neck pain and neck stiffness.   Skin: Negative for color change, pallor, rash  and wound.   Allergic/Immunologic: Negative for environmental allergies, food allergies and immunocompromised state.   Neurological: Negative for dizziness, tremors, seizures, syncope, facial asymmetry, speech difficulty, weakness, light-headedness, numbness and headaches.   Hematological: Negative for adenopathy. Does not bruise/bleed easily.   Psychiatric/Behavioral: Negative for agitation, behavioral problems, confusion, decreased concentration, dysphoric mood, hallucinations, self-injury, sleep disturbance and suicidal ideas. The patient is not nervous/anxious and is not hyperactive.         Objective:     LMP  (LMP Unknown)     Physical Exam   Constitutional: She appears well-developed and well-nourished. No distress.   HENT:   Head: Normocephalic.   Right Ear: External ear normal.   Left Ear: External ear normal.   Nose: Nose normal.   Mouth/Throat: Oropharynx is clear and moist. No oropharyngeal exudate.   Eyes: Pupils are equal, round, and reactive to light. Conjunctivae and EOM are normal. Right eye exhibits no discharge. Left eye exhibits no discharge. No scleral icterus.   Neck: Neck normal appearance.No JVD present. No tracheal deviation present. No thyromegaly present.   Cardiovascular:   No conjunctival pallor noted.    Pulmonary/Chest: Effort normal. No stridor.  No respiratory distress. She no audible wheeze...She exhibits no tenderness.   Abdominal: Abdomen appears normal. Soft. She exhibits no distension and no visible mass. There is no abdominal tenderness. No visible hernia present.   Musculoskeletal: Normal range of motion.         General: No tenderness, deformity, edema or no effusion.   Lymphadenopathy:     She has no cervical adenopathy.   Neurological: She is alert. No cranial nerve deficit. Coordination normal.   Skin: No rash noted. She is not diaphoretic. No nail bed cyanosis or erythema. No pallor. Nails show no clubbing.   Psychiatric: She mood appears normal. Her affect is normal. Her  behavior is normal. Thought content is normal. She does not express abnormal judgement.         Lab Review            Assessment/Plan       ICD-10-CM ICD-9-CM   1. Type 2 diabetes mellitus with stage 4 chronic kidney disease, with long-term current use of insulin (CMS/HCC)  E11.22 250.40    N18.4 585.4    Z79.4 V58.67   2. Hypertension associated with diabetes (CMS/HCC)  E11.59 250.80    I10 401.9   3. Mixed diabetic hyperlipidemia associated with type 2 diabetes mellitus (CMS/HCC)  E11.69 250.80    E78.2 272.2   4. Vitamin D deficiency  E55.9 268.9   5. B12 deficiency  E53.8 266.2   6. Renal osteodystrophy  N25.0 588.0       Glycemic Management:   Lab Results   Component Value Date    HGBA1C 6.6 (H) 10/14/2020    HGBA1C 7.0 (H) 07/15/2020    HGBA1C 8.0 (H) 04/13/2020     Lab Results   Component Value Date    GLUCOSE 207 (H) 10/14/2020    BUN 51 (H) 10/14/2020    CREATININE 2.86 (H) 10/14/2020    EGFRIFNONA 17 (L) 10/14/2020    BCR 17.8 10/14/2020    CO2 23.0 (L) 10/14/2020    CALCIUM 8.8 10/14/2020    ALBUMIN 3.50 10/14/2020    AST 18 10/14/2020    ALT 12 10/14/2020     Lab Results   Component Value Date    WBC 7.90 10/14/2020    HGB 10.7 (L) 10/14/2020    HCT 32.2 (L) 10/14/2020    MCV 93.1 10/14/2020     10/14/2020     Lab Results   Component Value Date    CREATININE 2.86 (H) 10/14/2020    CREATININE 2.87 (H) 09/16/2020    CREATININE 2.99 (H) 08/19/2020    CREATININE 2.93 (H) 07/15/2020    CREATININE 2.98 (H) 07/15/2020        Using deidra but video visit so not available       Trulicity 1.5 mg weekly -- increase to 3 mg weekly         Humulin U 500 before     Lantus 160 at night change to 145 -- 150 --- now decrease to 100 -- increase to 120     invokana , I wanted to add but GFR less than 30     Will give rx for u200 humalog up to 20-40 units with meals -- needs up to 80 w meals add 5 to meals          Lipid Management    Lab Results   Component Value Date    TRIG 197 (H) 10/14/2020    TRIG 339 (H)  07/15/2020    TRIG 336 (H) 04/13/2020     Lab Results   Component Value Date    HDL 42 (L) 10/14/2020    HDL 36 (L) 07/15/2020    HDL 45 (L) 04/13/2020     No components found for: LDLCALC  Lab Results   Component Value Date    LDL 58 10/14/2020    LDL 17 07/15/2020    LDL 51 04/13/2020     Lab Results   Component Value Date    LDL 58 10/14/2020    LDL 17 07/15/2020    LDL 51 04/13/2020         on Crestor 20 mg daily   Generic causing myalgias, higher doses not tolerated    Need to add repatha since LDL goal is less than 70 due to carotid artery disease and already on maximal tolerated statin and intolerant to zetia     Now on repatha and working     Blood Pressure Management:    There were no vitals filed for this visit.      Per nephrology , stable function       Microvascular Complication Monitoring:  No Microalbuminuria, No Diabetic Retinopathy, Date of last eye exam 07/18/2019, No Diabetic Neuropathy  on ACE i and coreg 12.5 po bid     ckd stage IV    followed by nephrology     I suggest that she doesn't consume more than 140 g protein per day   Plant better than animal but restricted on keto     --      Lab Results   Component Value Date    CREATININE 2.86 (H) 10/14/2020    BUN 51 (H) 10/14/2020     10/14/2020    K 4.7 10/14/2020     10/14/2020    CO2 23.0 (L) 10/14/2020         Lab Results   Component Value Date    CREATININE 2.86 (H) 10/14/2020    CREATININE 2.87 (H) 09/16/2020    CREATININE 2.99 (H) 08/19/2020         Immunizations:  Last pneumococcal immunization pneumovax before age 65     Flu Shot will have in Mid Nov 2016       Preventive Care:  No smoking      Weight Related:   Wt Readings from Last 3 Encounters:   10/14/20 (!) 175 kg (386 lb)   10/14/20 (!) 175 kg (386 lb)   09/16/20 (!) 172 kg (378 lb 12.8 oz)     There is no height or weight on file to calculate BMI.      prefers no bariatric surgery    Now on cpap       Bone Health    Lab Results   Component Value Date    PTH 38.3  09/04/2019    CALCIUM 8.8 10/14/2020     For JARETH     Was having hypercalcemia with rocatrol 0.25 three times weekly and on calcium    Now on sensipar 30 mg daily but without calcium -- not on sensipar anymore     Start calcium low dose 400 mg daily     DXA No 2018, osteopenia left femoral neck      Monitor PTH , no more than 150 , no less than 60       Thyroid Health  Lab Results   Component Value Date    TSH 2.650 10/14/2020     On levothyroxine 50 ug daily - increase to 75 mcgs daily - increase to 88     Other Diabetes Related Aspects       Lab Results   Component Value Date    HKGTYBAT52 448 10/14/2020     Lab Results   Component Value Date    WBC 7.90 10/14/2020    HGB 10.7 (L) 10/14/2020    HCT 32.2 (L) 10/14/2020    MCV 93.1 10/14/2020     10/14/2020     Lab Results   Component Value Date    IRON 90 10/14/2020    TIBC 273 (L) 10/14/2020    FERRITIN 405.70 (H) 10/14/2020       Anemia , managed by nephrology   Deciding vs epo vs iron   But now   DUB, may need hysterectomy       Systolic Murmur, AS? Echo   Could be due to anemia     Echo and carotid US from 7-17 , normal echo and less than 50% blockage in carotids, repeat     I reviewed and summarized records from Ole Soliman MD from present year  and I reviewed / ordered labs.     Orders Placed This Encounter   Procedures   • CBC Auto Differential   • Comprehensive Metabolic Panel   • Hemoglobin A1c   • Lipid Panel   • Microalbumin / Creatinine Urine Ratio - Urine, Clean Catch   • TSH   • Vitamin B12   • Vitamin D 25 Hydroxy   • PTH, Intact & Calcium         A copy of my note was sent to Ole Soliman MD    Please see my above opinion and suggestions.       I spent 15 minutes reviewing patient electronic chart , reviewing medications , past history , active problems.   I provided advice regarding management of medical conditions, refilled prescriptions , ordered labs and arranged for future appointment.   Patient was advised to contact us if there  were any unanswered questions or ongoing concerns.

## 2020-10-23 ENCOUNTER — DOCUMENTATION (OUTPATIENT)
Dept: ENDOCRINOLOGY | Facility: CLINIC | Age: 60
End: 2020-10-23

## 2020-10-27 RX ORDER — PEN NEEDLE, DIABETIC 29 G X1/2"
NEEDLE, DISPOSABLE MISCELLANEOUS
Qty: 200 EACH | Refills: 0 | Status: SHIPPED | OUTPATIENT
Start: 2020-10-27 | End: 2021-05-29

## 2020-10-27 NOTE — TELEPHONE ENCOUNTER
"Pt just called stating that she is completely out and needs a refill of her     BD INSULIN SYRINGE U/F 31G X 5/16\" 1 ML misc       Sent to   [facility] [address] - [phone] PH - [phone] FX  "

## 2020-11-10 DIAGNOSIS — N18.4 ANEMIA OF CHRONIC RENAL FAILURE, STAGE 4 (SEVERE) (HCC): ICD-10-CM

## 2020-11-10 DIAGNOSIS — N18.4 STAGE 4 CHRONIC KIDNEY DISEASE (HCC): Primary | ICD-10-CM

## 2020-11-10 DIAGNOSIS — D63.1 ANEMIA OF CHRONIC RENAL FAILURE, STAGE 4 (SEVERE) (HCC): ICD-10-CM

## 2020-11-11 ENCOUNTER — LAB (OUTPATIENT)
Dept: LAB | Facility: HOSPITAL | Age: 60
End: 2020-11-11

## 2020-11-11 ENCOUNTER — OFFICE VISIT (OUTPATIENT)
Dept: ONCOLOGY | Facility: CLINIC | Age: 60
End: 2020-11-11

## 2020-11-11 ENCOUNTER — INFUSION (OUTPATIENT)
Dept: ONCOLOGY | Facility: HOSPITAL | Age: 60
End: 2020-11-11

## 2020-11-11 VITALS
RESPIRATION RATE: 16 BRPM | HEART RATE: 65 BPM | SYSTOLIC BLOOD PRESSURE: 147 MMHG | DIASTOLIC BLOOD PRESSURE: 54 MMHG | TEMPERATURE: 97.8 F | OXYGEN SATURATION: 100 %

## 2020-11-11 VITALS
BODY MASS INDEX: 47.09 KG/M2 | HEIGHT: 66 IN | TEMPERATURE: 97.1 F | HEART RATE: 72 BPM | OXYGEN SATURATION: 98 % | DIASTOLIC BLOOD PRESSURE: 68 MMHG | RESPIRATION RATE: 16 BRPM | WEIGHT: 293 LBS | SYSTOLIC BLOOD PRESSURE: 122 MMHG

## 2020-11-11 DIAGNOSIS — D63.1 ANEMIA OF CHRONIC RENAL FAILURE, STAGE 4 (SEVERE) (HCC): ICD-10-CM

## 2020-11-11 DIAGNOSIS — N18.4 STAGE 4 CHRONIC KIDNEY DISEASE (HCC): Primary | ICD-10-CM

## 2020-11-11 DIAGNOSIS — N18.4 ANEMIA OF CHRONIC RENAL FAILURE, STAGE 4 (SEVERE) (HCC): ICD-10-CM

## 2020-11-11 DIAGNOSIS — D63.1 ANEMIA DUE TO STAGE 4 CHRONIC KIDNEY DISEASE (HCC): ICD-10-CM

## 2020-11-11 DIAGNOSIS — N18.4 ANEMIA DUE TO STAGE 4 CHRONIC KIDNEY DISEASE (HCC): Primary | ICD-10-CM

## 2020-11-11 DIAGNOSIS — N18.4 STAGE 4 CHRONIC KIDNEY DISEASE (HCC): ICD-10-CM

## 2020-11-11 DIAGNOSIS — N18.4 ANEMIA DUE TO STAGE 4 CHRONIC KIDNEY DISEASE (HCC): ICD-10-CM

## 2020-11-11 DIAGNOSIS — D63.1 ANEMIA DUE TO STAGE 4 CHRONIC KIDNEY DISEASE (HCC): Primary | ICD-10-CM

## 2020-11-11 LAB
ALBUMIN SERPL-MCNC: 3.7 G/DL (ref 3.5–5.2)
ALBUMIN/GLOB SERPL: 1.3 G/DL
ALP SERPL-CCNC: 77 U/L (ref 39–117)
ALT SERPL W P-5'-P-CCNC: 7 U/L (ref 1–33)
ANION GAP SERPL CALCULATED.3IONS-SCNC: 10 MMOL/L (ref 5–15)
AST SERPL-CCNC: 11 U/L (ref 1–32)
BASOPHILS # BLD AUTO: 0.03 10*3/MM3 (ref 0–0.2)
BASOPHILS NFR BLD AUTO: 0.3 % (ref 0–1.5)
BILIRUB SERPL-MCNC: 0.2 MG/DL (ref 0–1.2)
BUN SERPL-MCNC: 46 MG/DL (ref 8–23)
BUN/CREAT SERPL: 17.2 (ref 7–25)
CALCIUM SPEC-SCNC: 9 MG/DL (ref 8.6–10.5)
CHLORIDE SERPL-SCNC: 108 MMOL/L (ref 98–107)
CO2 SERPL-SCNC: 23 MMOL/L (ref 22–29)
CREAT SERPL-MCNC: 2.68 MG/DL (ref 0.57–1)
DEPRECATED RDW RBC AUTO: 53.2 FL (ref 37–54)
EOSINOPHIL # BLD AUTO: 0.17 10*3/MM3 (ref 0–0.4)
EOSINOPHIL NFR BLD AUTO: 1.9 % (ref 0.3–6.2)
ERYTHROCYTE [DISTWIDTH] IN BLOOD BY AUTOMATED COUNT: 15.5 % (ref 12.3–15.4)
GFR SERPL CREATININE-BSD FRML MDRD: 18 ML/MIN/1.73
GLOBULIN UR ELPH-MCNC: 2.9 GM/DL
GLUCOSE SERPL-MCNC: 175 MG/DL (ref 65–99)
HCT VFR BLD AUTO: 32.2 % (ref 34–46.6)
HGB BLD-MCNC: 10.4 G/DL (ref 12–15.9)
HOLD SPECIMEN: NORMAL
IMM GRANULOCYTES # BLD AUTO: 0.04 10*3/MM3 (ref 0–0.05)
IMM GRANULOCYTES NFR BLD AUTO: 0.5 % (ref 0–0.5)
LYMPHOCYTES # BLD AUTO: 1.63 10*3/MM3 (ref 0.7–3.1)
LYMPHOCYTES NFR BLD AUTO: 18.5 % (ref 19.6–45.3)
MCH RBC QN AUTO: 30.1 PG (ref 26.6–33)
MCHC RBC AUTO-ENTMCNC: 32.3 G/DL (ref 31.5–35.7)
MCV RBC AUTO: 93.1 FL (ref 79–97)
MONOCYTES # BLD AUTO: 0.37 10*3/MM3 (ref 0.1–0.9)
MONOCYTES NFR BLD AUTO: 4.2 % (ref 5–12)
NEUTROPHILS NFR BLD AUTO: 6.57 10*3/MM3 (ref 1.7–7)
NEUTROPHILS NFR BLD AUTO: 74.6 % (ref 42.7–76)
NRBC BLD AUTO-RTO: 0 /100 WBC (ref 0–0.2)
PLATELET # BLD AUTO: 197 10*3/MM3 (ref 140–450)
PMV BLD AUTO: 10.2 FL (ref 6–12)
POTASSIUM SERPL-SCNC: 4.6 MMOL/L (ref 3.5–5.2)
PROT SERPL-MCNC: 6.6 G/DL (ref 6–8.5)
RBC # BLD AUTO: 3.46 10*6/MM3 (ref 3.77–5.28)
SODIUM SERPL-SCNC: 141 MMOL/L (ref 136–145)
WBC # BLD AUTO: 8.81 10*3/MM3 (ref 3.4–10.8)

## 2020-11-11 PROCEDURE — 96372 THER/PROPH/DIAG INJ SC/IM: CPT

## 2020-11-11 PROCEDURE — 25010000002 EPOETIN ALFA-EPBX 40000 UNIT/ML SOLUTION: Performed by: NURSE PRACTITIONER

## 2020-11-11 PROCEDURE — 36415 COLL VENOUS BLD VENIPUNCTURE: CPT

## 2020-11-11 PROCEDURE — 99213 OFFICE O/P EST LOW 20 MIN: CPT | Performed by: NURSE PRACTITIONER

## 2020-11-11 PROCEDURE — 85025 COMPLETE CBC W/AUTO DIFF WBC: CPT

## 2020-11-11 PROCEDURE — 80053 COMPREHEN METABOLIC PANEL: CPT

## 2020-11-11 RX ADMIN — EPOETIN ALFA-EPBX 40000 UNITS: 40000 INJECTION, SOLUTION INTRAVENOUS; SUBCUTANEOUS at 15:01

## 2020-11-11 NOTE — PROGRESS NOTES
[facility]  HEMATOLOGY & ONCOLOGY    W ONC [facility] HEMATOLOGY AND ONCOLOGY  [address]  [facility] [address] [phone]    Patient Name: Maria C Shrestha  Encounter Date: 10/14/2020  YOB: 1960  Patient Number: [id number]    Chief Complaint   Patient presents with   • Anemia     Here for f/u   • Arm Pain     achy deep feeling       REASON FOR VISIT:  Mrs. Maria C Shrestha is a 60-year-old female patient here today in follow-up for her anemia.  She has been followed by Dr. Huitron and receiving Procrit when she met guidelines.  She has a stage IV chronic kidney disease for which is followed by nephrology, Dr Davis.  She is also required iron infusions in the past with the last being on 7/17/2020 and 7/31/2020 with Injectafer.  Her last Procrit was on 10/14/2020 for hgb of 10.7, hct 32.2.     She presents today in follow-up.  She states she has been quite fatigued and tired lately.  Her hemoglobin is at 10.4 with hematocrit of 32.2.  She continues to meet guidelines for Procrit therapy today.  Her white count remains normal at 8.81 with a platelet count of 197,000.  CMP shows her GFR to be stable at 18ml/min and glucose of 175, nonfasting.     She denies abdominal pain today.  No shortness of breath except for with a lot of exertion.  No chest pain.  No headaches dizziness.  She denies any significant pain other than her generalized muscle aches and joint pain that she has typically.  She has had a lot of stress in her life.    PAST MEDICAL HISTORY:  ALLERGIES:  Allergies   Allergen Reactions   • Codeine Nausea Only       CURRENT MEDICATIONS:  Outpatient Encounter Medications as of 11/11/2020   Medication Sig Dispense Refill   • allopurinol (ZYLOPRIM) 100 MG tablet Take 100 mg by mouth 2 (Two) Times a Day.     • amLODIPine (NORVASC) 10 MG tablet Take 10 mg by mouth.     • aspirin 81 MG tablet Take 81 mg by mouth Daily.  "Stop 7/22/2018     • BD Insulin Syringe U/F 31G X 5/16\" 1 ML misc AS DIRECTED FOUR TIMES A  each 0   • busPIRone (BUSPAR) 10 MG tablet buspirone 10 mg tablet   Take 2 tablets twice a day by oral route as needed for 90 days.     • carvedilol (COREG) 6.25 MG tablet Take 6.25 mg by mouth 2 (Two) Times a Day With Meals.     • cetirizine (zyrTEC) 10 MG tablet Take 10 mg by mouth As Needed.     • Cholecalciferol (VITAMIN D3) 60899 units tablet Vitamin D2 50,000 unit capsule   Take 1 capsule every week by oral route. Sunday     • citalopram (CeleXA) 40 MG tablet Take 40 mg by mouth.     • diazePAM (VALIUM) 2 MG tablet Take 2 mg by mouth As Needed for Anxiety.     • Dulaglutide (Trulicity) 1.5 MG/0.5ML solution pen-injector Inject 1.5 mg under the skin into the appropriate area as directed Every 7 (Seven) Days. 6 mL 3   • Ergocalciferol (VITAMIN D2 PO) Take 1,250 mcg by mouth 1 (One) Time Per Week. VITAMIN D2 1250 MCG (50,000 UNIT) CAPSULE TAKE 1 CAPSULE EVERY WEEK BY ORAL ROUTE     • Evolocumab 140 MG/ML solution auto-injector Inject 1 mL under the skin into the appropriate area as directed Every 14 (Fourteen) Days for 24 doses. NDC [address]-20 6 mL 10   • fluticasone (VERAMYST) 27.5 MCG/SPRAY nasal spray 2 sprays into each nostril Daily.     • Glucose Blood (BLOOD GLUCOSE TEST) strip Use 4 x daily, use any brand covered by insurance or same brand as before 360 each 3   • ibuprofen (ADVIL,MOTRIN) 800 MG tablet Take 800 mg by mouth Every 8 (Eight) Hours As Needed for Mild Pain .     • insulin glargine (Lantus) 100 UNIT/ML injection INJECT 200 UNITS DAILY 60 mL 11   • Insulin Lispro (HumaLOG KwikPen) 200 UNIT/ML solution pen-injector Inject 80 Units under the skin into the appropriate area as directed 3 (Three) Times a Day With Meals. 24 pen 11   • Insulin Pen Needle (B-D ULTRAFINE III SHORT PEN) 31G X 8 MM misc USE AS DIRECTED TO INJECT FOUR TIMES DAILY 200 each 2   • lamoTRIgine (LaMICtal) 50 MG tablet dispersible " disintegrating tablet Take 50 mg by mouth Every Night.     • Lancets (freestyle) lancets Use as instructed 4 x daily 150 each 11   • levothyroxine (Synthroid) 88 MCG tablet Take 1 tablet by mouth Daily. 30 tablet 11   • lisinopril (PRINIVIL,ZESTRIL) 40 MG tablet Take 40 mg by mouth 2 (Two) Times a Day.     • Melatonin 10 MG tablet Take  by mouth At Night As Needed.     • Multiple Vitamins-Minerals (MULTIVITAMIN ADULT PO) Take 1 tablet by mouth Daily.     • Omega-3 Fatty Acids (FISH OIL) 1000 MG capsule capsule Take 2,000 mg by mouth Daily With Dinner.     • potassium gluconate 595 (99 K) MG tablet tablet potassium gluconate 595 mg (99 mg) tablet   Take 1 tablets every day by oral route.     • rosuvastatin (CRESTOR) 10 MG tablet Take 10 mg by mouth Daily.     • sodium bicarbonate 650 MG tablet Take 650 mg by mouth 4 (Four) Times a Day.     • traMADol (ULTRAM) 50 MG tablet Take 1 tablet by mouth Every 6 (Six) Hours As Needed for Moderate Pain . 8 tablet 0     No facility-administered encounter medications on file as of 11/11/2020.      ADULT ILLNESSES:  Patient Active Problem List   Diagnosis Code   • Type 2 diabetes mellitus with stage 3 chronic kidney disease, with long-term current use of insulin (CMS/[facility]) E11.22, N18.30, Z79.4   • Mixed diabetic hyperlipidemia associated with type 2 diabetes mellitus (CMS/[facility]) E11.69, E78.2   • Hypertension associated with diabetes (CMS/[facility]) E11.59, I10   • Vitamin D deficiency E55.9   • B12 deficiency E53.8   • Anemia in CKD (chronic kidney disease) N18.9, D63.1   • Acquired hypothyroidism E03.9   • Chronic kidney disease, stage 4 (severe) (CMS/[facility]) N18.4   • Anemia D64.9   • Anxiety F41.9   • Deep venous thrombosis of lower extremity (CMS/[facility]) I82.409   • Disease of liver K76.9   • Embolism (CMS/[facility]) I74.9   • Hypertriglyceridemia E78.1   • Hypocalcemia E83.51   • Mood disorder (CMS/[facility]) F39   • Morbid obesity (CMS/[facility]) E66.01   • Sleep apnea G47.30   • Acute renal failure  superimposed on stage 3 chronic kidney disease (CMS/HCC) N17.9, N18.30   • Anemia of chronic renal failure N18.9, D63.1   • Hypertension I10   • Diabetes mellitus (CMS/HCC) E11.9   • Thyroid disease E07.9   • Hypothyroidism E03.9   • Type 2 DM with CKD stage 3 and hypertension (CMS/HCC) E11.22, I12.9, N18.30   • Vitamin D deficiency E55.9   • Anemia due to stage 4 chronic kidney disease (CMS/HCC) N18.4, D63.1   • Stage 4 chronic kidney disease (CMS/HCC) N18.4     SURGERIES:  Past Surgical History:   Procedure Laterality Date   • ADENOIDECTOMY     • ANAL FISTULA REPAIR      x 2    • CHOLECYSTECTOMY     • COLONOSCOPY  01/02/2014   • COLONOSCOPY N/A 3/22/2017    Procedure: COLONOSCOPY WITH ANESTHESIA;  Surgeon: Mono Linder MD;  Location: [facility] ENDOSCOPY;  Service:    • D&C HYSTEROSCOPY N/A 7/25/2018    Procedure: DILATATION AND CURETTAGE HYSTEROSCOPY;  Surgeon: Michael Castaneda MD;  Location: [facility] OR;  Service: Obstetrics/Gynecology   • DILATATION AND CURETTAGE      X 2   • ENDOSCOPY     • TONSILLECTOMY       HEALTH MAINTENANCE ITEMS:  Last Completed Colonoscopy       Status Date      COLONOSCOPY Done 3/22/2017 COLONOSCOPY     Colonoscopy on 3/22/2017 per Dr. Rich been found a 5 mm polyp in the proximal transverse colon, 6 mm polyp distal sigmoid colon.  There was inflamed because at the anus appeared to be an inflamed anal papilla improved compared to 2014.  Recommended repeat surveillance in 5 years.          Last Completed Mammogram       Status Date      MAMMOGRAM Done 8/12/2020 MAMMO SCREENING DIGITAL TOMOSYNTHESIS BILATERAL W CAD     Mammogram negative for malignancy          FAMILY HISTORY:  Family History   Problem Relation Age of Onset   • Diabetes Other    • Heart failure Other    • Cancer Other    • Kidney disease Other    • Cancer Mother    • Cancer Father    • Heart disease Father    • Diabetes Father    • Obesity Father    • Stroke Father    • Cancer Maternal Grandmother    • Uterine cancer  Maternal Grandmother    • Diabetes Maternal Grandfather    • Cancer Paternal Grandmother    • Colon cancer Paternal Grandmother    • Diabetes Paternal Grandfather    • Heart disease Paternal Grandfather    • No Known Problems Sister    • No Known Problems Brother    • No Known Problems Daughter    • No Known Problems Maternal Aunt    • No Known Problems Paternal Aunt    • BRCA 1/2 Neg Hx    • Breast cancer Neg Hx    • Endometrial cancer Neg Hx    • Ovarian cancer Neg Hx      SOCIAL HISTORY:  Social History     Socioeconomic History   • Marital status:      Spouse name: Not on file   • Number of children: Not on file   • Years of education: Not on file   • Highest education level: Not on file   Tobacco Use   • Smoking status: Never Smoker   • Smokeless tobacco: Never Used   Substance and Sexual Activity   • Alcohol use: No   • Drug use: No   • Sexual activity: Never     Birth control/protection: Post-menopausal       REVIEW OF SYSTEMS:  Review of systems unchanged from last visit:  Review of Systems   Constitutional: Positive for fatigue. Negative for activity change, appetite change, chills, diaphoresis, fever and unexpected weight loss.   HENT: Negative for ear pain, nosebleeds, sinus pressure, sore throat and voice change.    Eyes: Negative for blurred vision, double vision, pain and visual disturbance.   Respiratory: Negative for cough and shortness of breath.    Cardiovascular: Negative for chest pain, palpitations and leg swelling.   Gastrointestinal: Negative for abdominal pain, anal bleeding, blood in stool, constipation, diarrhea, nausea and vomiting.   Endocrine: Negative for heat intolerance, polydipsia and polyuria.   Genitourinary: Negative for dysuria, frequency, hematuria, urgency and urinary incontinence.   Musculoskeletal: Negative for arthralgias and myalgias.   Skin: Negative for rash and skin lesions.   Neurological: Negative for dizziness, tremors, seizures, syncope, speech difficulty,  "weakness and headache.   Hematological: Negative for adenopathy. Does not bruise/bleed easily.   Psychiatric/Behavioral: Negative for dysphoric mood, sleep disturbance, suicidal ideas and depressed mood.       /68   Pulse 72   Temp 97.1 °F (36.2 °C) (Temporal)   Resp 16   Ht 166.4 cm (65.5\")   Wt (!) 176 kg (388 lb 1.6 oz)   LMP  (LMP Unknown)   SpO2 98%   Breastfeeding No   BMI 63.60 kg/m²  Body surface area is 2.64 meters squared.  Pain Score    11/11/20 1320   PainSc: 0-No pain       Physical Exam:  Physical exam unchanged from last visit:  Physical Exam   Constitutional: She is oriented to person, place, and time. She appears well-developed and well-nourished.   HENT:   Head: Atraumatic.   Mouth/Throat: Oropharynx is clear and moist.   Eyes: Pupils are equal, round, and reactive to light. No scleral icterus.   Neck: Trachea normal. Neck supple. No JVD present.   Cardiovascular: Normal rate, regular rhythm and normal pulses. Exam reveals no gallop and no friction rub.   No murmur heard.  Pulmonary/Chest: Effort normal and breath sounds normal. She has no wheezes. She has no rhonchi. She has no rales. Chest wall is not dull to percussion.   Abdominal: Soft. Normal appearance. There is no abdominal tenderness. There is no rebound and no guarding.   Difficult to examine due to body habitus   Lymphadenopathy:     She has no cervical adenopathy.        Right: No supraclavicular adenopathy present.        Left: No supraclavicular adenopathy present.   Neurological: She is alert and oriented to person, place, and time. No sensory deficit.   Psychiatric: Judgment normal.       Maria C Shrestha reports a pain score of   Pain Score    11/11/20 1320   PainSc: 0-No pain     .    Patient's Body mass index is 63.6 kg/m². BMI is above normal parameters. Recommendations include: defer to pcp.      LABS    Lab Results - Last 18 Months   Lab Units 11/11/20  1302 10/14/20  1023 09/16/20  1244 08/19/20  1255 " 07/15/20  1255 07/15/20  1006 06/17/20  1241 05/20/20  1056   HEMOGLOBIN g/dL 10.4* 10.7* 10.7* 10.6* 10.0* 10.7* 10.1* 10.9*   HEMATOCRIT % 32.2* 32.2* 32.9* 32.8* 30.5* 32.3* 31.2* 33.5*   MCV fL 93.1 93.1 94.0 94.8 93.0 91.8 93.4 92.3   WBC 10*3/mm3 8.81 7.90 9.13 8.53 10.15 10.20 8.64 9.96   RDW % 15.5* 15.4 16.0* 16.6* 16.0* 15.9* 15.9* 15.6*   MPV fL 10.2 9.5 10.2 10.4 10.3 9.2 10.3 10.2   PLATELETS 10*3/mm3 197 190 197 173 218 230 229 235   IMM GRAN % % 0.5  --  0.4 0.5 0.5  --  0.3 0.6*   NEUTROS ABS 10*3/mm3 6.57 6.10 6.36 6.06 7.55* 7.90* 6.27 7.20*   LYMPHS ABS 10*3/mm3 1.63 1.40 1.99 1.70 1.81 1.80 1.71 1.95   MONOS ABS 10*3/mm3 0.37  --  0.47 0.48 0.48  --  0.44 0.49   EOS ABS 10*3/mm3 0.17  --  0.23 0.22 0.21  --  0.16 0.22   BASOS ABS 10*3/mm3 0.03  --  0.04 0.03 0.05  --  0.03 0.04   IMMATURE GRANS (ABS) 10*3/mm3 0.04  --  0.04 0.04 0.05  --  0.03 0.06*   NRBC /100 WBC 0.0  --  0.0 0.0 0.0  --  0.0 0.0       Lab Results - Last 18 Months   Lab Units 11/11/20  1302 10/14/20  1023 09/16/20  1244 08/19/20  1255 07/15/20  1255 07/15/20  1006   GLUCOSE mg/dL 175* 207* 164* 136* 190* 163*   SODIUM mmol/L 141 135 140 142 139 138   POTASSIUM mmol/L 4.6 4.7 4.6 4.3 4.4 4.2   CO2 mmol/L 23.0 23.0* 20.0* 24.0 22.0 24.0   CHLORIDE mmol/L 108* 107 109* 106 105 105   ANION GAP mmol/L 10.0 5.0 11.0 12.0 12.0 9.0   CREATININE mg/dL 2.68* 2.86* 2.87* 2.99* 2.93* 2.98*   BUN mg/dL 46* 51* 50* 33* 40* 38*   BUN / CREAT RATIO  17.2 17.8 17.4 11.0 13.7 12.8   CALCIUM mg/dL 9.0 8.8 9.4 9.3 9.2 9.4   EGFR IF NONAFRICN AM mL/min/1.73 18* 17* 17* 16* 16* 16*   ALK PHOS U/L 77 78 66 58 62 67   TOTAL PROTEIN g/dL 6.6 6.6 6.7 6.7 6.7 7.1   ALT (SGPT) U/L 7 12 9 8 10 15   AST (SGOT) U/L 11 18 9 10 14 20   BILIRUBIN mg/dL 0.2 0.4 0.2 0.2 0.2 0.2   ALBUMIN g/dL 3.70 3.50 3.80 3.60 3.60 3.80   GLOBULIN gm/dL 2.9 3.1 2.9 3.1 3.1 3.3     Lab Results - Last 18 Months   Lab Units 10/14/20  1023 08/19/20  1255 07/15/20  1255  07/15/20  1006 04/22/20  0816 04/13/20  1038 01/08/20  0923 12/18/19  1137 09/04/19  0825 08/07/19  1308   IRON mcg/dL 90 83 56  --  72  --   --   --   --  77   TIBC mcg/dL 273* 256* 308  --  288*  --   --   --   --  276   IRON SATURATION % 33 32 18*  --  25  --   --   --   --  28   FERRITIN ng/mL 405.70* 639.60* 135.70  --  135.10  --   --   --   --  90.10   TSH uIU/mL 2.650  --   --  3.010  --  7.250* 3.590 3.640 2.570  --      Assessment/Plans:   **Anemia secondary to chronic kidney disease stage IV   · Hemoglobin at 10.4 with hematocrit 32.2, hemoglobin remaining stable with the Procrit therapy monthly.  On average hemoglobin is remaining above 10 at this dosing; meets guidelines for procrit today.  · GFR 18ml/min  · Iron saturation 32%, ferritin 639.6 status post Injectafer 7/15 and 7/31/2020   --We will continue to give Procrit 40,000 units subcu today as her hemoglobin is at 10.4 and hematocrit 32.2 and she meets guidelines with a GFR of 18 ml/min.  --We will continue given Procrit monthly when patient meets guidelines of hemoglobin less than 11 and hematocrit less than 33 and normal iron studies.      ** Infection status:   · No current issues   **Metabolic / Renal:   --Chronic kidney disease stage IV with a GFR of 17ml/min, she is followed by Dr. Davis nephrologist closely.   ** Endocrine:   --Diabetes mellitus followed by Dr. Patel.  Patient's nonfasting glucose today is at 175, nonfasting which is improved from last visit.  Her hgbA1c has improved to 6.6. She was encouraged to continue following with her endocrinologist.  --Hypothyroidism also managed by endocrinology with Synthroid. TSH at 2.650.   ** Coagulation / VTE prophylaxis:   - no current issues  ** GI:   -no current issues  ** Pulmonary:   -- no current issues  ** Cardiology:   -Hypertension controlled with medications. BP today at 151/65  ** Skin / Rash:   · No current issues   ** :   · No current issues   ** Neurologic:   · No current  issues   ** Psychosocial:   · No current issues   ** Pain control:   - no current issue  ** Health Maintenance-as mentioned above    I spent 28 total minutes, face-to-face, caring for Maria C today.  Greater than 50% of this time involved counseling and/or coordination of care as documented within this note regarding the patient's illness(es), pros and cons of various treatment options, instructions and/or risk reduction.    Sola Mallory, APRN   11/11/2020

## 2020-11-20 ENCOUNTER — DOCUMENTATION (OUTPATIENT)
Dept: ENDOCRINOLOGY | Facility: CLINIC | Age: 60
End: 2020-11-20

## 2020-12-09 ENCOUNTER — APPOINTMENT (OUTPATIENT)
Dept: ONCOLOGY | Facility: HOSPITAL | Age: 60
End: 2020-12-09

## 2020-12-11 DIAGNOSIS — N18.4 STAGE 4 CHRONIC KIDNEY DISEASE (HCC): Primary | ICD-10-CM

## 2020-12-11 DIAGNOSIS — D63.1 ANEMIA OF CHRONIC RENAL FAILURE, STAGE 4 (SEVERE) (HCC): ICD-10-CM

## 2020-12-11 DIAGNOSIS — N18.4 ANEMIA OF CHRONIC RENAL FAILURE, STAGE 4 (SEVERE) (HCC): ICD-10-CM

## 2020-12-14 ENCOUNTER — APPOINTMENT (OUTPATIENT)
Dept: ONCOLOGY | Facility: HOSPITAL | Age: 60
End: 2020-12-14

## 2020-12-14 ENCOUNTER — APPOINTMENT (OUTPATIENT)
Dept: LAB | Facility: HOSPITAL | Age: 60
End: 2020-12-14

## 2020-12-21 ENCOUNTER — INFUSION (OUTPATIENT)
Dept: ONCOLOGY | Facility: HOSPITAL | Age: 60
End: 2020-12-21

## 2020-12-21 ENCOUNTER — OFFICE VISIT (OUTPATIENT)
Dept: ONCOLOGY | Facility: CLINIC | Age: 60
End: 2020-12-21

## 2020-12-21 ENCOUNTER — LAB (OUTPATIENT)
Dept: LAB | Facility: HOSPITAL | Age: 60
End: 2020-12-21

## 2020-12-21 VITALS
BODY MASS INDEX: 47.09 KG/M2 | RESPIRATION RATE: 16 BRPM | HEART RATE: 60 BPM | SYSTOLIC BLOOD PRESSURE: 148 MMHG | HEIGHT: 66 IN | DIASTOLIC BLOOD PRESSURE: 68 MMHG | OXYGEN SATURATION: 99 % | WEIGHT: 293 LBS | TEMPERATURE: 97 F

## 2020-12-21 VITALS
OXYGEN SATURATION: 100 % | SYSTOLIC BLOOD PRESSURE: 164 MMHG | TEMPERATURE: 97.1 F | RESPIRATION RATE: 16 BRPM | DIASTOLIC BLOOD PRESSURE: 76 MMHG | HEIGHT: 65 IN | WEIGHT: 293 LBS | HEART RATE: 62 BPM | BODY MASS INDEX: 48.82 KG/M2

## 2020-12-21 DIAGNOSIS — N18.4 ANEMIA DUE TO STAGE 4 CHRONIC KIDNEY DISEASE (HCC): Primary | ICD-10-CM

## 2020-12-21 DIAGNOSIS — N18.4 ANEMIA DUE TO STAGE 4 CHRONIC KIDNEY DISEASE (HCC): ICD-10-CM

## 2020-12-21 DIAGNOSIS — D63.1 ANEMIA DUE TO STAGE 4 CHRONIC KIDNEY DISEASE (HCC): ICD-10-CM

## 2020-12-21 DIAGNOSIS — N18.4 ANEMIA OF CHRONIC RENAL FAILURE, STAGE 4 (SEVERE) (HCC): ICD-10-CM

## 2020-12-21 DIAGNOSIS — E03.9 HYPOTHYROIDISM, UNSPECIFIED TYPE: ICD-10-CM

## 2020-12-21 DIAGNOSIS — D63.1 ANEMIA DUE TO STAGE 4 CHRONIC KIDNEY DISEASE (HCC): Primary | ICD-10-CM

## 2020-12-21 DIAGNOSIS — N18.4 STAGE 4 CHRONIC KIDNEY DISEASE (HCC): Primary | ICD-10-CM

## 2020-12-21 DIAGNOSIS — D63.1 ANEMIA OF CHRONIC RENAL FAILURE, STAGE 4 (SEVERE) (HCC): ICD-10-CM

## 2020-12-21 DIAGNOSIS — N18.4 CHRONIC KIDNEY DISEASE, STAGE 4 (SEVERE) (HCC): Primary | ICD-10-CM

## 2020-12-21 DIAGNOSIS — N18.4 STAGE 4 CHRONIC KIDNEY DISEASE (HCC): ICD-10-CM

## 2020-12-21 LAB
ALBUMIN SERPL-MCNC: 3.8 G/DL (ref 3.5–5.2)
ALBUMIN/GLOB SERPL: 1.2 G/DL
ALP SERPL-CCNC: 82 U/L (ref 39–117)
ALT SERPL W P-5'-P-CCNC: 7 U/L (ref 1–33)
ANION GAP SERPL CALCULATED.3IONS-SCNC: 10 MMOL/L (ref 5–15)
AST SERPL-CCNC: 9 U/L (ref 1–32)
BASOPHILS # BLD AUTO: 0.03 10*3/MM3 (ref 0–0.2)
BASOPHILS NFR BLD AUTO: 0.3 % (ref 0–1.5)
BILIRUB SERPL-MCNC: 0.2 MG/DL (ref 0–1.2)
BUN SERPL-MCNC: 50 MG/DL (ref 8–23)
BUN/CREAT SERPL: 20.3 (ref 7–25)
CALCIUM SPEC-SCNC: 9.2 MG/DL (ref 8.6–10.5)
CHLORIDE SERPL-SCNC: 109 MMOL/L (ref 98–107)
CO2 SERPL-SCNC: 22 MMOL/L (ref 22–29)
CREAT SERPL-MCNC: 2.46 MG/DL (ref 0.57–1)
DEPRECATED RDW RBC AUTO: 52.1 FL (ref 37–54)
EOSINOPHIL # BLD AUTO: 0.25 10*3/MM3 (ref 0–0.4)
EOSINOPHIL NFR BLD AUTO: 2.4 % (ref 0.3–6.2)
ERYTHROCYTE [DISTWIDTH] IN BLOOD BY AUTOMATED COUNT: 15.4 % (ref 12.3–15.4)
GFR SERPL CREATININE-BSD FRML MDRD: 20 ML/MIN/1.73
GLOBULIN UR ELPH-MCNC: 3.2 GM/DL
GLUCOSE SERPL-MCNC: 208 MG/DL (ref 65–99)
HCT VFR BLD AUTO: 31.9 % (ref 34–46.6)
HGB BLD-MCNC: 10.5 G/DL (ref 12–15.9)
HOLD SPECIMEN: NORMAL
IMM GRANULOCYTES # BLD AUTO: 0.06 10*3/MM3 (ref 0–0.05)
IMM GRANULOCYTES NFR BLD AUTO: 0.6 % (ref 0–0.5)
LYMPHOCYTES # BLD AUTO: 1.88 10*3/MM3 (ref 0.7–3.1)
LYMPHOCYTES NFR BLD AUTO: 18 % (ref 19.6–45.3)
MCH RBC QN AUTO: 30.6 PG (ref 26.6–33)
MCHC RBC AUTO-ENTMCNC: 32.9 G/DL (ref 31.5–35.7)
MCV RBC AUTO: 93 FL (ref 79–97)
MONOCYTES # BLD AUTO: 0.49 10*3/MM3 (ref 0.1–0.9)
MONOCYTES NFR BLD AUTO: 4.7 % (ref 5–12)
NEUTROPHILS NFR BLD AUTO: 7.75 10*3/MM3 (ref 1.7–7)
NEUTROPHILS NFR BLD AUTO: 74 % (ref 42.7–76)
NRBC BLD AUTO-RTO: 0 /100 WBC (ref 0–0.2)
PLATELET # BLD AUTO: 193 10*3/MM3 (ref 140–450)
PMV BLD AUTO: 10.5 FL (ref 6–12)
POTASSIUM SERPL-SCNC: 4.1 MMOL/L (ref 3.5–5.2)
PROT SERPL-MCNC: 7 G/DL (ref 6–8.5)
RBC # BLD AUTO: 3.43 10*6/MM3 (ref 3.77–5.28)
SODIUM SERPL-SCNC: 141 MMOL/L (ref 136–145)
WBC # BLD AUTO: 10.46 10*3/MM3 (ref 3.4–10.8)

## 2020-12-21 PROCEDURE — 36415 COLL VENOUS BLD VENIPUNCTURE: CPT

## 2020-12-21 PROCEDURE — 96372 THER/PROPH/DIAG INJ SC/IM: CPT

## 2020-12-21 PROCEDURE — 80053 COMPREHEN METABOLIC PANEL: CPT

## 2020-12-21 PROCEDURE — 25010000002 EPOETIN ALFA-EPBX 40000 UNIT/ML SOLUTION: Performed by: NURSE PRACTITIONER

## 2020-12-21 PROCEDURE — 85025 COMPLETE CBC W/AUTO DIFF WBC: CPT

## 2020-12-21 PROCEDURE — 99213 OFFICE O/P EST LOW 20 MIN: CPT | Performed by: NURSE PRACTITIONER

## 2020-12-21 RX ADMIN — EPOETIN ALFA-EPBX 40000 UNITS: 40000 INJECTION, SOLUTION INTRAVENOUS; SUBCUTANEOUS at 15:01

## 2021-01-15 DIAGNOSIS — N18.4 ANEMIA DUE TO STAGE 4 CHRONIC KIDNEY DISEASE (HCC): ICD-10-CM

## 2021-01-15 DIAGNOSIS — D63.1 ANEMIA DUE TO STAGE 4 CHRONIC KIDNEY DISEASE (HCC): ICD-10-CM

## 2021-01-15 DIAGNOSIS — N18.4 STAGE 4 CHRONIC KIDNEY DISEASE (HCC): Primary | ICD-10-CM

## 2021-01-18 ENCOUNTER — LAB (OUTPATIENT)
Dept: LAB | Facility: HOSPITAL | Age: 61
End: 2021-01-18

## 2021-01-18 ENCOUNTER — OFFICE VISIT (OUTPATIENT)
Dept: ONCOLOGY | Facility: CLINIC | Age: 61
End: 2021-01-18

## 2021-01-18 VITALS
WEIGHT: 293 LBS | SYSTOLIC BLOOD PRESSURE: 138 MMHG | RESPIRATION RATE: 16 BRPM | TEMPERATURE: 97 F | OXYGEN SATURATION: 97 % | HEIGHT: 65 IN | DIASTOLIC BLOOD PRESSURE: 82 MMHG | BODY MASS INDEX: 48.82 KG/M2 | HEART RATE: 72 BPM

## 2021-01-18 DIAGNOSIS — N18.4 STAGE 4 CHRONIC KIDNEY DISEASE (HCC): Primary | ICD-10-CM

## 2021-01-18 DIAGNOSIS — D63.1 ANEMIA DUE TO STAGE 4 CHRONIC KIDNEY DISEASE (HCC): ICD-10-CM

## 2021-01-18 DIAGNOSIS — E11.22 TYPE 2 DM WITH CKD STAGE 3 AND HYPERTENSION (HCC): ICD-10-CM

## 2021-01-18 DIAGNOSIS — N18.4 TYPE 2 DIABETES MELLITUS WITH STAGE 4 CHRONIC KIDNEY DISEASE, WITH LONG-TERM CURRENT USE OF INSULIN (HCC): ICD-10-CM

## 2021-01-18 DIAGNOSIS — I12.9 TYPE 2 DM WITH CKD STAGE 3 AND HYPERTENSION (HCC): ICD-10-CM

## 2021-01-18 DIAGNOSIS — N18.4 ANEMIA DUE TO STAGE 4 CHRONIC KIDNEY DISEASE (HCC): ICD-10-CM

## 2021-01-18 DIAGNOSIS — N18.4 ANEMIA DUE TO STAGE 4 CHRONIC KIDNEY DISEASE (HCC): Primary | ICD-10-CM

## 2021-01-18 DIAGNOSIS — E11.22 TYPE 2 DIABETES MELLITUS WITH STAGE 4 CHRONIC KIDNEY DISEASE, WITH LONG-TERM CURRENT USE OF INSULIN (HCC): ICD-10-CM

## 2021-01-18 DIAGNOSIS — N18.30 TYPE 2 DM WITH CKD STAGE 3 AND HYPERTENSION (HCC): ICD-10-CM

## 2021-01-18 DIAGNOSIS — Z79.4 TYPE 2 DIABETES MELLITUS WITH STAGE 4 CHRONIC KIDNEY DISEASE, WITH LONG-TERM CURRENT USE OF INSULIN (HCC): ICD-10-CM

## 2021-01-18 DIAGNOSIS — D63.1 ANEMIA DUE TO STAGE 4 CHRONIC KIDNEY DISEASE (HCC): Primary | ICD-10-CM

## 2021-01-18 DIAGNOSIS — E66.01 MORBID OBESITY (HCC): ICD-10-CM

## 2021-01-18 LAB
ALBUMIN SERPL-MCNC: 3.6 G/DL (ref 3.5–5.2)
ALBUMIN/GLOB SERPL: 0.9 G/DL
ALP SERPL-CCNC: 102 U/L (ref 39–117)
ALT SERPL W P-5'-P-CCNC: 6 U/L (ref 1–33)
ANION GAP SERPL CALCULATED.3IONS-SCNC: 10 MMOL/L (ref 5–15)
AST SERPL-CCNC: 10 U/L (ref 1–32)
BASOPHILS # BLD AUTO: 0.05 10*3/MM3 (ref 0–0.2)
BASOPHILS NFR BLD AUTO: 0.5 % (ref 0–1.5)
BILIRUB SERPL-MCNC: 0.2 MG/DL (ref 0–1.2)
BUN SERPL-MCNC: 48 MG/DL (ref 8–23)
BUN/CREAT SERPL: 16.9 (ref 7–25)
CALCIUM SPEC-SCNC: 9.5 MG/DL (ref 8.6–10.5)
CHLORIDE SERPL-SCNC: 106 MMOL/L (ref 98–107)
CO2 SERPL-SCNC: 23 MMOL/L (ref 22–29)
CREAT SERPL-MCNC: 2.84 MG/DL (ref 0.57–1)
DEPRECATED RDW RBC AUTO: 51.2 FL (ref 37–54)
EOSINOPHIL # BLD AUTO: 0.24 10*3/MM3 (ref 0–0.4)
EOSINOPHIL NFR BLD AUTO: 2.4 % (ref 0.3–6.2)
ERYTHROCYTE [DISTWIDTH] IN BLOOD BY AUTOMATED COUNT: 15.2 % (ref 12.3–15.4)
GFR SERPL CREATININE-BSD FRML MDRD: 17 ML/MIN/1.73
GLOBULIN UR ELPH-MCNC: 3.9 GM/DL
GLUCOSE SERPL-MCNC: 280 MG/DL (ref 65–99)
HCT VFR BLD AUTO: 34.2 % (ref 34–46.6)
HGB BLD-MCNC: 11.1 G/DL (ref 12–15.9)
HOLD SPECIMEN: NORMAL
IMM GRANULOCYTES # BLD AUTO: 0.08 10*3/MM3 (ref 0–0.05)
IMM GRANULOCYTES NFR BLD AUTO: 0.8 % (ref 0–0.5)
LYMPHOCYTES # BLD AUTO: 1.84 10*3/MM3 (ref 0.7–3.1)
LYMPHOCYTES NFR BLD AUTO: 18.5 % (ref 19.6–45.3)
MCH RBC QN AUTO: 30 PG (ref 26.6–33)
MCHC RBC AUTO-ENTMCNC: 32.5 G/DL (ref 31.5–35.7)
MCV RBC AUTO: 92.4 FL (ref 79–97)
MONOCYTES # BLD AUTO: 0.47 10*3/MM3 (ref 0.1–0.9)
MONOCYTES NFR BLD AUTO: 4.7 % (ref 5–12)
NEUTROPHILS NFR BLD AUTO: 7.28 10*3/MM3 (ref 1.7–7)
NEUTROPHILS NFR BLD AUTO: 73.1 % (ref 42.7–76)
NRBC BLD AUTO-RTO: 0 /100 WBC (ref 0–0.2)
PLATELET # BLD AUTO: 217 10*3/MM3 (ref 140–450)
PMV BLD AUTO: 10.4 FL (ref 6–12)
POTASSIUM SERPL-SCNC: 4.4 MMOL/L (ref 3.5–5.2)
PROT SERPL-MCNC: 7.5 G/DL (ref 6–8.5)
RBC # BLD AUTO: 3.7 10*6/MM3 (ref 3.77–5.28)
SODIUM SERPL-SCNC: 139 MMOL/L (ref 136–145)
WBC # BLD AUTO: 9.96 10*3/MM3 (ref 3.4–10.8)

## 2021-01-18 PROCEDURE — 36415 COLL VENOUS BLD VENIPUNCTURE: CPT

## 2021-01-18 PROCEDURE — 85025 COMPLETE CBC W/AUTO DIFF WBC: CPT

## 2021-01-18 PROCEDURE — 80053 COMPREHEN METABOLIC PANEL: CPT

## 2021-01-18 PROCEDURE — 99213 OFFICE O/P EST LOW 20 MIN: CPT | Performed by: NURSE PRACTITIONER

## 2021-01-18 NOTE — PROGRESS NOTES
[facility]  HEMATOLOGY & ONCOLOGY    W [facility] HEMATOLOGY AND ONCOLOGY  [address]  [facility] [address] [phone]    Patient Name: Maria C Shrestha  Encounter Date: 01/18/2021  YOB: 1960  Patient Number: [id number]    Chief Complaint   Patient presents with   • Anemia     Here for f/u   • Chronic Kidney Disease       REASON FOR VISIT: Mrs. Maria C Shrestha is a pleasant 60-year-old female patient here today in follow-up for her anemia.  She had been followed by Dr. Huitron and receiving Procrit when she met guidelines.  She has a stage IV chronic kidney disease for which is followed by nephrology, Dr Davis.  She has also required iron infusions in the past with the last being on 7/17/2020 and 7/31/2020 with Injectafer.  Her last Procrit was on 12/21/2020 as her labs included hemoglobin 10.5, hematocrit 31.9, and GFR 20.     She presents today in office follow-up.  She states she has been extremely fatigued still, has no energy, and has generalized weakness, but is still dealing with sinus congestion and allergies that she mentioned at her last appointment.  She states she does have some depression, and lacks motivation to do anything around the house or to manage her diabetes at this time.  Patient states she has gained a few pounds, and believes her A1c has increased due to her eating habits.  The last A1c she had on 10/14/2020 was 6.6.  Encouraged patient to make small changes to work towards managing her diabetes.  Discussed with patient the extensive damage diabetes does to the body including the kidneys, and on her general health.    Her hemoglobin is at 11.1 with hematocrit of 34.2 today, therefore she does not meet the guidelines to receive Procrit today.  Her white count remains normal at 9.96 with a platelet count of 217,000.  Today her GFR is 17.  Patient stated she last saw Dr. Davis in December,  "and he discussed with her that he was going to get her ready for dialysis due to her continuous low GFR.     She denies abdominal pain today.  No shortness of breath except for with a lot of exertion.  No chest pain.  No headaches dizziness.  She denies any significant pain other than her generalized muscle aches and joint pain that she has typically.  She has had no fever, chills or night sweats.     Patient states she has had decreased activity since her last appointment.  She does get lower extremity edema, and shortness of breath on exertion.  She does complain of pain in her hip, knees, and shoulders.  Patient denies being able to walk more than a few feet.  She does use a wheelchair to get around.  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  Patient denies abdominal pain, nausea, vomiting, headaches, dizziness, fever, chills, or night sweats.    PAST MEDICAL HISTORY:  ALLERGIES:  Allergies   Allergen Reactions   • Codeine Nausea Only       CURRENT MEDICATIONS:  Outpatient Encounter Medications as of 1/18/2021   Medication Sig Dispense Refill   • allopurinol (ZYLOPRIM) 100 MG tablet Take 100 mg by mouth 2 (Two) Times a Day.     • amLODIPine (NORVASC) 10 MG tablet Take 10 mg by mouth.     • aspirin 81 MG tablet Take 81 mg by mouth Daily. Stop 7/22/2018     • BD Insulin Syringe U/F 31G X 5/16\" 1 ML misc AS DIRECTED FOUR TIMES A  each 0   • busPIRone (BUSPAR) 10 MG tablet buspirone 10 mg tablet   Take 2 tablets twice a day by oral route as needed for 90 days.     • carvedilol (COREG) 6.25 MG tablet Take 6.25 mg by mouth 2 (Two) Times a Day With Meals.     • cetirizine (zyrTEC) 10 MG tablet Take 10 mg by mouth As Needed.     • Cholecalciferol (VITAMIN D3) 49851 units tablet Vitamin D2 50,000 unit capsule   Take 1 capsule every week by oral route. Sunday     • citalopram (CeleXA) 40 MG tablet Take 40 mg by " mouth.     • diazePAM (VALIUM) 2 MG tablet Take 2 mg by mouth As Needed for Anxiety.     • Dulaglutide (Trulicity) 1.5 MG/0.5ML solution pen-injector Inject 1.5 mg under the skin into the appropriate area as directed Every 7 (Seven) Days. 6 mL 3   • Evolocumab 140 MG/ML solution auto-injector Inject 1 mL under the skin into the appropriate area as directed Every 14 (Fourteen) Days for 24 doses. NDC [address]-99 6 mL 10   • fluticasone (VERAMYST) 27.5 MCG/SPRAY nasal spray 2 sprays into each nostril Daily.     • Glucose Blood (BLOOD GLUCOSE TEST) strip Use 4 x daily, use any brand covered by insurance or same brand as before 360 each 3   • insulin glargine (Lantus) 100 UNIT/ML injection INJECT 200 UNITS DAILY 60 mL 11   • Insulin Lispro (HumaLOG KwikPen) 200 UNIT/ML solution pen-injector Inject 80 Units under the skin into the appropriate area as directed 3 (Three) Times a Day With Meals. 24 pen 11   • Insulin Pen Needle (B-D ULTRAFINE III SHORT PEN) 31G X 8 MM misc USE AS DIRECTED TO INJECT FOUR TIMES DAILY 200 each 2   • lamoTRIgine (LaMICtal) 50 MG tablet dispersible disintegrating tablet Take 50 mg by mouth Every Night.     • Lancets (freestyle) lancets Use as instructed 4 x daily 150 each 11   • levothyroxine (Synthroid) 88 MCG tablet Take 1 tablet by mouth Daily. 30 tablet 11   • lisinopril (PRINIVIL,ZESTRIL) 40 MG tablet Take 40 mg by mouth 2 (Two) Times a Day.     • Multiple Vitamins-Minerals (MULTIVITAMIN ADULT PO) Take 1 tablet by mouth Daily.     • Omega-3 Fatty Acids (FISH OIL) 1000 MG capsule capsule Take 2,000 mg by mouth Daily With Dinner.     • potassium gluconate 595 (99 K) MG tablet tablet potassium gluconate 595 mg (99 mg) tablet   Take 1 tablets every day by oral route.     • rosuvastatin (CRESTOR) 10 MG tablet Take 10 mg by mouth Daily.     • sodium bicarbonate 650 MG tablet Take 650 mg by mouth 4 (Four) Times a Day.       No facility-administered encounter medications on file as of 1/18/2021.         ADULT ILLNESSES:  Patient Active Problem List   Diagnosis Code   • Type 2 diabetes mellitus with stage 3 chronic kidney disease, with long-term current use of insulin (CMS/HCC) E11.22, N18.30, Z79.4   • Mixed diabetic hyperlipidemia associated with type 2 diabetes mellitus (CMS/HCC) E11.69, E78.2   • Hypertension associated with diabetes (CMS/HCC) E11.59, I10   • Vitamin D deficiency E55.9   • B12 deficiency E53.8   • Anemia in CKD (chronic kidney disease) N18.9, D63.1   • Acquired hypothyroidism E03.9   • Chronic kidney disease, stage 4 (severe) (CMS/HCC) N18.4   • Anemia D64.9   • Anxiety F41.9   • Deep venous thrombosis of lower extremity (CMS/HCC) I82.409   • Disease of liver K76.9   • Embolism (CMS/HCC) I74.9   • Hypertriglyceridemia E78.1   • Hypocalcemia E83.51   • Mood disorder (CMS/HCC) F39   • Morbid obesity (CMS/HCC) E66.01   • Sleep apnea G47.30   • Acute renal failure superimposed on stage 3 chronic kidney disease (CMS/formerly [facility]) N17.9, N18.30   • Anemia of chronic renal failure N18.9, D63.1   • Hypertension I10   • Diabetes mellitus (CMS/HCC) E11.9   • Thyroid disease E07.9   • Hypothyroidism E03.9   • Type 2 DM with CKD stage 3 and hypertension (CMS/HCC) E11.22, I12.9, N18.30   • Vitamin D deficiency E55.9   • Anemia due to stage 4 chronic kidney disease (CMS/formerly [facility]) N18.4, D63.1   • Stage 4 chronic kidney disease (CMS/formerly [facility]) N18.4       SURGERIES:  Past Surgical History:   Procedure Laterality Date   • ADENOIDECTOMY     • ANAL FISTULA REPAIR      x 2    • CHOLECYSTECTOMY     • COLONOSCOPY  01/02/2014   • COLONOSCOPY N/A 3/22/2017    Procedure: COLONOSCOPY WITH ANESTHESIA;  Surgeon: Mono Linder MD;  Location: [facility] ENDOSCOPY;  Service:    • D&C HYSTEROSCOPY N/A 7/25/2018    Procedure: DILATATION AND CURETTAGE HYSTEROSCOPY;  Surgeon: Michael Castaneda MD;  Location: [facility] OR;  Service: Obstetrics/Gynecology   • DILATATION AND CURETTAGE      X 2   • ENDOSCOPY     • TONSILLECTOMY         HEALTH  MAINTENANCE ITEMS:  <no information>  Last Completed Colonoscopy       Status Date      COLONOSCOPY Done 3/22/2017 COLONOSCOPY     Patient has more history with this topic...  01/18/21 - Patient states she is due for colonoscopy in 1-2 years           Last Completed Mammogram       Status Date      MAMMOGRAM Done 8/12/2020 MAMMO SCREENING DIGITAL TOMOSYNTHESIS BILATERAL W CAD     Patient has more history with this topic...            FAMILY HISTORY:  Family History   Problem Relation Age of Onset   • Diabetes Other    • Heart failure Other    • Cancer Other    • Kidney disease Other    • Cancer Mother    • Cancer Father    • Heart disease Father    • Diabetes Father    • Obesity Father    • Stroke Father    • Cancer Maternal Grandmother    • Uterine cancer Maternal Grandmother    • Diabetes Maternal Grandfather    • Cancer Paternal Grandmother    • Colon cancer Paternal Grandmother    • Diabetes Paternal Grandfather    • Heart disease Paternal Grandfather    • No Known Problems Sister    • No Known Problems Brother    • No Known Problems Daughter    • No Known Problems Maternal Aunt    • No Known Problems Paternal Aunt    • BRCA 1/2 Neg Hx    • Breast cancer Neg Hx    • Endometrial cancer Neg Hx    • Ovarian cancer Neg Hx        SOCIAL HISTORY:  Social History     Socioeconomic History   • Marital status:      Spouse name: Not on file   • Number of children: Not on file   • Years of education: Not on file   • Highest education level: Not on file   Tobacco Use   • Smoking status: Never Smoker   • Smokeless tobacco: Never Used   Substance and Sexual Activity   • Alcohol use: No   • Drug use: No   • Sexual activity: Never     Birth control/protection: Post-menopausal       REVIEW OF SYSTEMS:  Review of Systems   Constitutional: Positive for activity change and fatigue. Negative for appetite change, chills, diaphoresis, fever and unexpected weight loss.   HENT: Positive for postnasal drip and sore throat.  "Negative for ear pain, nosebleeds, sinus pressure and voice change.    Eyes: Negative for blurred vision, double vision, pain and visual disturbance.   Respiratory: Positive for shortness of breath (upon exertion). Negative for cough.    Cardiovascular: Positive for leg swelling. Negative for chest pain and palpitations.   Gastrointestinal: Negative for abdominal pain, anal bleeding, blood in stool, constipation, diarrhea, nausea and vomiting.   Endocrine: Negative for heat intolerance, polydipsia and polyuria.   Genitourinary: Negative for dysuria, frequency, hematuria, urgency and urinary incontinence.   Musculoskeletal: Positive for arthralgias. Negative for myalgias.   Skin: Negative for rash and skin lesions.   Neurological: Negative for dizziness, tremors, seizures, syncope, speech difficulty, weakness and headache.   Hematological: Negative for adenopathy. Does not bruise/bleed easily.   Psychiatric/Behavioral: Positive for depressed mood. Negative for dysphoric mood, sleep disturbance and suicidal ideas.       /82   Pulse 72   Temp 97 °F (36.1 °C) (Temporal)   Resp 16   Ht 165.1 cm (65\")   Wt (!) 175 kg (386 lb)   LMP  (LMP Unknown)   SpO2 97%   Breastfeeding No   BMI 64.23 kg/m²  Body surface area is 2.62 meters squared.      Physical Exam:  Physical Exam  Constitutional:       Appearance: Normal appearance. She is obese.   HENT:      Head: Normocephalic.      Nose: Nose normal.   Neck:      Musculoskeletal: Normal range of motion.   Cardiovascular:      Rate and Rhythm: Normal rate and regular rhythm.      Heart sounds: Normal heart sounds.   Pulmonary:      Effort: Pulmonary effort is normal.      Breath sounds: Normal breath sounds.   Abdominal:      Palpations: Abdomen is soft.   Musculoskeletal:      Comments: Patient unable to walk except for very short distances. Mainly mobile in wheelchair.   Skin:     General: Skin is warm and dry.   Neurological:      General: No focal deficit " present.      Mental Status: She is alert and oriented to person, place, and time.   Psychiatric:         Mood and Affect: Mood normal.         Behavior: Behavior normal.         Patient's Body mass index is 64.23 kg/m². BMI is above normal parameters. Recommendations include: referral to primary care.      LABS    Lab Results - Last 18 Months   Lab Units 01/18/21  1321 12/21/20  1400 11/11/20  1302 10/14/20  1023 09/16/20  1244 08/19/20  1255 07/15/20  1255   HEMOGLOBIN g/dL 11.1* 10.5* 10.4* 10.7* 10.7* 10.6* 10.0*   HEMATOCRIT % 34.2 31.9* 32.2* 32.2* 32.9* 32.8* 30.5*   MCV fL 92.4 93.0 93.1 93.1 94.0 94.8 93.0   WBC 10*3/mm3 9.96 10.46 8.81 7.90 9.13 8.53 10.15   RDW % 15.2 15.4 15.5* 15.4 16.0* 16.6* 16.0*   MPV fL 10.4 10.5 10.2 9.5 10.2 10.4 10.3   PLATELETS 10*3/mm3 217 193 197 190 197 173 218   IMM GRAN % % 0.8* 0.6* 0.5  --  0.4 0.5 0.5   NEUTROS ABS 10*3/mm3 7.28* 7.75* 6.57 6.10 6.36 6.06 7.55*   LYMPHS ABS 10*3/mm3 1.84 1.88 1.63 1.40 1.99 1.70 1.81   MONOS ABS 10*3/mm3 0.47 0.49 0.37  --  0.47 0.48 0.48   EOS ABS 10*3/mm3 0.24 0.25 0.17  --  0.23 0.22 0.21   BASOS ABS 10*3/mm3 0.05 0.03 0.03  --  0.04 0.03 0.05   IMMATURE GRANS (ABS) 10*3/mm3 0.08* 0.06* 0.04  --  0.04 0.04 0.05   NRBC /100 WBC 0.0 0.0 0.0  --  0.0 0.0 0.0       Lab Results - Last 18 Months   Lab Units 01/18/21  1321 12/21/20  1400 11/11/20  1302 10/14/20  1023 09/16/20  1244 08/19/20  1255   GLUCOSE mg/dL 280* 208* 175* 207* 164* 136*   SODIUM mmol/L 139 141 141 135 140 142   POTASSIUM mmol/L 4.4 4.1 4.6 4.7 4.6 4.3   CO2 mmol/L 23.0 22.0 23.0 23.0* 20.0* 24.0   CHLORIDE mmol/L 106 109* 108* 107 109* 106   ANION GAP mmol/L 10.0 10.0 10.0 5.0 11.0 12.0   CREATININE mg/dL 2.84* 2.46* 2.68* 2.86* 2.87* 2.99*   BUN mg/dL 48* 50* 46* 51* 50* 33*   BUN / CREAT RATIO  16.9 20.3 17.2 17.8 17.4 11.0   CALCIUM mg/dL 9.5 9.2 9.0 8.8 9.4 9.3   EGFR IF NONAFRICN AM mL/min/1.73 17* 20* 18* 17* 17* 16*   ALK PHOS U/L 102 82 77 78 66 58   TOTAL  PROTEIN g/dL 7.5 7.0 6.6 6.6 6.7 6.7   ALT (SGPT) U/L 6 7 7 12 9 8   AST (SGOT) U/L 10 9 11 18 9 10   BILIRUBIN mg/dL 0.2 0.2 0.2 0.4 0.2 0.2   ALBUMIN g/dL 3.60 3.80 3.70 3.50 3.80 3.60   GLOBULIN gm/dL 3.9 3.2 2.9 3.1 2.9 3.1       No results for input(s): MSPIKE, KAPPALAMB, IGLFLC, URICACID, FREEKAPPAL, CEA, LDH, REFLABREPO in the last 31425 hours.    Lab Results - Last 18 Months   Lab Units 10/14/20  1023 08/19/20  1255 07/15/20  1255 07/15/20  1006 04/22/20  0816 04/13/20  1038 01/08/20  0923 12/18/19  1137 09/04/19  0825 08/07/19  1308   IRON mcg/dL 90 83 56  --  72  --   --   --   --  77   TIBC mcg/dL 273* 256* 308  --  288*  --   --   --   --  276   IRON SATURATION % 33 32 18*  --  25  --   --   --   --  28   FERRITIN ng/mL 405.70* 639.60* 135.70  --  135.10  --   --   --   --  90.10   TSH uIU/mL 2.650  --   --  3.010  --  7.250* 3.590 3.640 2.570  --      Assessment/Plans:   **Anemia secondary to chronic kidney disease stage IV   ? Hemoglobin at 11.1 with hematocrit 34.2, hemoglobin remaining stable with the Procrit therapy monthly.  On average hemoglobin is remaining above 10 at this dosing; she does not meet guidelines for procrit today.  ? GFR 17ml/min, stable  --We will continue given Procrit monthly when patient meets guidelines of hemoglobin less than 11 and hematocrit less than 33 and normal iron studies.       ** Infection status:   · No current issues   **Metabolic / Renal:   --Chronic kidney disease stage IV with a GFR of 20ml/min, she is followed by Dr. Davis nephrologist closely.   ** Endocrine:   --Diabetes mellitus followed by Dr. Patel.  Patient's nonfasting glucose today is at 280, nonfasting.  Her hgbA1c has improved to 6.6. She was encouraged to continue following with her endocrinologist, and start making small changes to improve her diabetes without overwhelming herself.  --Hypothyroidism also managed by endocrinology with Synthroid. TSH at 2.650 on 10/14/2020.  ** Coagulation /  VTE prophylaxis:   - no current issues  ** GI:   -no current issues  ** Pulmonary:   -- no current issues  ** Cardiology:   -Hypertension controlled with medications. BP today at 164/76  ** Skin / Rash:   · No current issues   ** :   · No current issues   ** Neurologic:   · No current issues   ** Psychosocial:   · No current issues   ** Pain control:   - no current issue  ** Health Maintenance-as mentioned above     I spent 20 total minutes, face-to-face, caring for Maria C today.  Greater than 50% of this time involved counseling and/or coordination of care as documented within this note regarding the patient's illness(es), pros and cons of various treatment options, instructions and/or risk reduction.    FLOR Soares  01/18/2021  15:52 CST    I have reviewed the notes, assessments, and/or procedures performed by FLOR Soares, I concur with her/his documentation of Maria C Shrestha.     FLOR Marie  01/18/2021

## 2021-02-12 DIAGNOSIS — N18.4 ANEMIA DUE TO STAGE 4 CHRONIC KIDNEY DISEASE (HCC): Primary | ICD-10-CM

## 2021-02-12 DIAGNOSIS — N18.4 STAGE 4 CHRONIC KIDNEY DISEASE (HCC): ICD-10-CM

## 2021-02-12 DIAGNOSIS — D63.1 ANEMIA DUE TO STAGE 4 CHRONIC KIDNEY DISEASE (HCC): Primary | ICD-10-CM

## 2021-02-16 NOTE — PROGRESS NOTES
[facility]  HEMATOLOGY & ONCOLOGY    [facility] HEMATOLOGY AND ONCOLOGY  [address]  [facility] [address] [phone]    Patient Name: Maria C Shrestha  Encounter Date: 02/17/2021  YOB: 1960  Patient Number: [id number]    No chief complaint on file.      REASON FOR VISIT: Mrs. Maria C Shrestha is a pleasant 60-year-old female patient here today in follow-up for her anemia.  She had been followed by Dr. Huitron and receiving Procrit when she met guidelines.  She has a stage IV chronic kidney disease for which is followed by nephrology, Dr Davis.  She has also required iron infusions in the past with the last being on 7/17/2020 and 7/31/2020 with Injectafer.  Her last Procrit was on 12/21/2020 as her labs included hemoglobin 10.5, hematocrit 31.9, and GFR 20.     She presents today in office follow-up.  She states she has been extremely fatigued still, has no energy, and has generalized weakness, but is still dealing with sinus congestion and allergies that she mentioned at her last appointment.  She states she does have some depression, and lacks motivation to do anything around the house or to manage her diabetes at this time.  Patient states she has gained a few pounds, and believes her A1c has increased due to her eating habits.  The last A1c she had on 10/14/2020 was 6.6.  Encouraged patient to make small changes to work towards managing her diabetes.  Discussed with patient the extensive damage diabetes does to the body including the kidneys, and on her general health.     Her hemoglobin is at 11.1 with hematocrit of 34.2 today, therefore she does not meet the guidelines to receive Procrit today.  Her white count remains normal at 9.96 with a platelet count of 217,000.  Today her GFR is 17.  Patient stated she last saw Dr. Davis in December, and he discussed with her that he was going to get her ready for  "dialysis due to her continuous low GFR.     She denies abdominal pain today.  No shortness of breath except for with a lot of exertion.  No chest pain.  No headaches dizziness.  She denies any significant pain other than her generalized muscle aches and joint pain that she has typically.  She has had no fever, chills or night sweats.      Patient states she has had decreased activity since her last appointment.  She does get lower extremity edema, and shortness of breath on exertion.  She does complain of pain in her hip, knees, and shoulders.  Patient denies being able to walk more than a few feet.  She does use a wheelchair to get around.  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  Patient denies abdominal pain, nausea, vomiting, headaches, dizziness, fever, chills, or night sweats.       PAST MEDICAL HISTORY:  ALLERGIES:  Allergies   Allergen Reactions   • Codeine Nausea Only       CURRENT MEDICATIONS:  Outpatient Encounter Medications as of 2/17/2021   Medication Sig Dispense Refill   • allopurinol (ZYLOPRIM) 100 MG tablet Take 100 mg by mouth 2 (Two) Times a Day.     • amLODIPine (NORVASC) 10 MG tablet Take 10 mg by mouth.     • aspirin 81 MG tablet Take 81 mg by mouth Daily. Stop 7/22/2018     • BD Insulin Syringe U/F 31G X 5/16\" 1 ML misc AS DIRECTED FOUR TIMES A  each 0   • busPIRone (BUSPAR) 10 MG tablet buspirone 10 mg tablet   Take 2 tablets twice a day by oral route as needed for 90 days.     • carvedilol (COREG) 6.25 MG tablet Take 6.25 mg by mouth 2 (Two) Times a Day With Meals.     • cetirizine (zyrTEC) 10 MG tablet Take 10 mg by mouth As Needed.     • Cholecalciferol (VITAMIN D3) 69982 units tablet Vitamin D2 50,000 unit capsule   Take 1 capsule every week by oral route. Sunday     • citalopram (CeleXA) 40 MG tablet Take 40 mg by mouth.     • diazePAM (VALIUM) 2 MG tablet Take 2 mg by mouth " As Needed for Anxiety.     • Dulaglutide (Trulicity) 1.5 MG/0.5ML solution pen-injector Inject 1.5 mg under the skin into the appropriate area as directed Every 7 (Seven) Days. 6 mL 3   • Evolocumab 140 MG/ML solution auto-injector Inject 1 mL under the skin into the appropriate area as directed Every 14 (Fourteen) Days for 24 doses. NDC [address]-07 6 mL 10   • fluticasone (VERAMYST) 27.5 MCG/SPRAY nasal spray 2 sprays into each nostril Daily.     • Glucose Blood (BLOOD GLUCOSE TEST) strip Use 4 x daily, use any brand covered by insurance or same brand as before 360 each 3   • insulin glargine (Lantus) 100 UNIT/ML injection INJECT 200 UNITS DAILY 60 mL 11   • Insulin Lispro (HumaLOG KwikPen) 200 UNIT/ML solution pen-injector Inject 80 Units under the skin into the appropriate area as directed 3 (Three) Times a Day With Meals. 24 pen 11   • Insulin Pen Needle (B-D ULTRAFINE III SHORT PEN) 31G X 8 MM misc USE AS DIRECTED TO INJECT FOUR TIMES DAILY 200 each 2   • lamoTRIgine (LaMICtal) 50 MG tablet dispersible disintegrating tablet Take 50 mg by mouth Every Night.     • Lancets (freestyle) lancets Use as instructed 4 x daily 150 each 11   • levothyroxine (Synthroid) 88 MCG tablet Take 1 tablet by mouth Daily. 30 tablet 11   • lisinopril (PRINIVIL,ZESTRIL) 40 MG tablet Take 40 mg by mouth 2 (Two) Times a Day.     • Multiple Vitamins-Minerals (MULTIVITAMIN ADULT PO) Take 1 tablet by mouth Daily.     • Omega-3 Fatty Acids (FISH OIL) 1000 MG capsule capsule Take 2,000 mg by mouth Daily With Dinner.     • potassium gluconate 595 (99 K) MG tablet tablet potassium gluconate 595 mg (99 mg) tablet   Take 1 tablets every day by oral route.     • rosuvastatin (CRESTOR) 10 MG tablet Take 10 mg by mouth Daily.     • sodium bicarbonate 650 MG tablet Take 650 mg by mouth 4 (Four) Times a Day.       No facility-administered encounter medications on file as of 2/17/2021.        ADULT ILLNESSES:  Patient Active Problem List    Diagnosis Code   • Type 2 diabetes mellitus with stage 3 chronic kidney disease, with long-term current use of insulin (CMS/HCC) E11.22, N18.30, Z79.4   • Mixed diabetic hyperlipidemia associated with type 2 diabetes mellitus (CMS/[facility]) E11.69, E78.2   • Hypertension associated with diabetes (CMS/[facility]) E11.59, I15.2   • Vitamin D deficiency E55.9   • B12 deficiency E53.8   • Anemia in CKD (chronic kidney disease) N18.9, D63.1   • Acquired hypothyroidism E03.9   • Chronic kidney disease, stage 4 (severe) (CMS/[facility]) N18.4   • Anemia D64.9   • Anxiety F41.9   • Deep venous thrombosis of lower extremity (CMS/HCC) I82.409   • Disease of liver K76.9   • Embolism (CMS/HCC) I74.9   • Hypertriglyceridemia E78.1   • Hypocalcemia E83.51   • Mood disorder (CMS/HCC) F39   • Morbid obesity (CMS/HCC) E66.01   • Sleep apnea G47.30   • Acute renal failure superimposed on stage 3 chronic kidney disease (CMS/[facility]) N17.9, N18.30   • Anemia of chronic renal failure N18.9, D63.1   • Hypertension I10   • Diabetes mellitus (CMS/[facility]) E11.9   • Thyroid disease E07.9   • Hypothyroidism E03.9   • Vitamin D deficiency E55.9   • Anemia due to stage 4 chronic kidney disease (CMS/[facility]) N18.4, D63.1   • Stage 4 chronic kidney disease (CMS/[facility]) N18.4       SURGERIES:  Past Surgical History:   Procedure Laterality Date   • ADENOIDECTOMY     • ANAL FISTULA REPAIR      x 2    • CHOLECYSTECTOMY     • COLONOSCOPY  01/02/2014   • COLONOSCOPY N/A 3/22/2017    Procedure: COLONOSCOPY WITH ANESTHESIA;  Surgeon: Mono Linder MD;  Location: [facility] ENDOSCOPY;  Service:    • D&C HYSTEROSCOPY N/A 7/25/2018    Procedure: DILATATION AND CURETTAGE HYSTEROSCOPY;  Surgeon: Michael Castaneda MD;  Location: [facility] OR;  Service: Obstetrics/Gynecology   • DILATATION AND CURETTAGE      X 2   • ENDOSCOPY     • TONSILLECTOMY         HEALTH MAINTENANCE ITEMS:  <no information>  Last Completed Colonoscopy       Status Date      COLONOSCOPY Done 3/22/2017 COLONOSCOPY      Patient has more history with this topic...          Last Completed Mammogram       Status Date      MAMMOGRAM Done 8/12/2020 MAMMO SCREENING DIGITAL TOMOSYNTHESIS BILATERAL W CAD     Patient has more history with this topic...          FAMILY HISTORY:  Family History   Problem Relation Age of Onset   • Diabetes Other    • Heart failure Other    • Cancer Other    • Kidney disease Other    • Cancer Mother    • Cancer Father    • Heart disease Father    • Diabetes Father    • Obesity Father    • Stroke Father    • Cancer Maternal Grandmother    • Uterine cancer Maternal Grandmother    • Diabetes Maternal Grandfather    • Cancer Paternal Grandmother    • Colon cancer Paternal Grandmother    • Diabetes Paternal Grandfather    • Heart disease Paternal Grandfather    • No Known Problems Sister    • No Known Problems Brother    • No Known Problems Daughter    • No Known Problems Maternal Aunt    • No Known Problems Paternal Aunt    • BRCA 1/2 Neg Hx    • Breast cancer Neg Hx    • Endometrial cancer Neg Hx    • Ovarian cancer Neg Hx        SOCIAL HISTORY:  Social History     Socioeconomic History   • Marital status:      Spouse name: Not on file   • Number of children: Not on file   • Years of education: Not on file   • Highest education level: Not on file   Tobacco Use   • Smoking status: Never Smoker   • Smokeless tobacco: Never Used   Substance and Sexual Activity   • Alcohol use: No   • Drug use: No   • Sexual activity: Never     Birth control/protection: Post-menopausal       REVIEW OF SYSTEMS:  Review of Systems    LMP  (LMP Unknown)  There is no height or weight on file to calculate BSA.  There were no vitals filed for this visit.    Physical Exam:  Physical Exam    Maria C Shrestha reports a pain score of .  Given her pain assessment as noted, treatment options were discussed and the following options were decided upon as a follow-up plan to address the patient's pain: {[facility] PAIN ASSESSMENT FOLLOW-UP  PLAN:53374}.      Patient's There is no height or weight on file to calculate BMI. BMI is {BMI range:55246}.      LABS    Lab Results - Last 18 Months   Lab Units 01/18/21  1321 12/21/20  1400 11/11/20  1302 10/14/20  1023 09/16/20  1244 08/19/20  1255 07/15/20  1255   HEMOGLOBIN g/dL 11.1* 10.5* 10.4* 10.7* 10.7* 10.6* 10.0*   HEMATOCRIT % 34.2 31.9* 32.2* 32.2* 32.9* 32.8* 30.5*   MCV fL 92.4 93.0 93.1 93.1 94.0 94.8 93.0   WBC 10*3/mm3 9.96 10.46 8.81 7.90 9.13 8.53 10.15   RDW % 15.2 15.4 15.5* 15.4 16.0* 16.6* 16.0*   MPV fL 10.4 10.5 10.2 9.5 10.2 10.4 10.3   PLATELETS 10*3/mm3 217 193 197 190 197 173 218   IMM GRAN % % 0.8* 0.6* 0.5  --  0.4 0.5 0.5   NEUTROS ABS 10*3/mm3 7.28* 7.75* 6.57 6.10 6.36 6.06 7.55*   LYMPHS ABS 10*3/mm3 1.84 1.88 1.63 1.40 1.99 1.70 1.81   MONOS ABS 10*3/mm3 0.47 0.49 0.37  --  0.47 0.48 0.48   EOS ABS 10*3/mm3 0.24 0.25 0.17  --  0.23 0.22 0.21   BASOS ABS 10*3/mm3 0.05 0.03 0.03  --  0.04 0.03 0.05   IMMATURE GRANS (ABS) 10*3/mm3 0.08* 0.06* 0.04  --  0.04 0.04 0.05   NRBC /100 WBC 0.0 0.0 0.0  --  0.0 0.0 0.0       Lab Results - Last 18 Months   Lab Units 01/18/21  1321 12/21/20  1400 11/11/20  1302 10/14/20  1023 09/16/20  1244 08/19/20  1255   GLUCOSE mg/dL 280* 208* 175* 207* 164* 136*   SODIUM mmol/L 139 141 141 135 140 142   POTASSIUM mmol/L 4.4 4.1 4.6 4.7 4.6 4.3   CO2 mmol/L 23.0 22.0 23.0 23.0* 20.0* 24.0   CHLORIDE mmol/L 106 109* 108* 107 109* 106   ANION GAP mmol/L 10.0 10.0 10.0 5.0 11.0 12.0   CREATININE mg/dL 2.84* 2.46* 2.68* 2.86* 2.87* 2.99*   BUN mg/dL 48* 50* 46* 51* 50* 33*   BUN / CREAT RATIO  16.9 20.3 17.2 17.8 17.4 11.0   CALCIUM mg/dL 9.5 9.2 9.0 8.8 9.4 9.3   EGFR IF NONAFRICN AM mL/min/1.73 17* 20* 18* 17* 17* 16*   ALK PHOS U/L 102 82 77 78 66 58   TOTAL PROTEIN g/dL 7.5 7.0 6.6 6.6 6.7 6.7   ALT (SGPT) U/L 6 7 7 12 9 8   AST (SGOT) U/L 10 9 11 18 9 10   BILIRUBIN mg/dL 0.2 0.2 0.2 0.4 0.2 0.2   ALBUMIN g/dL 3.60 3.80 3.70 3.50 3.80 3.60   GLOBULIN  gm/dL 3.9 3.2 2.9 3.1 2.9 3.1       No results for input(s): MSPIKE, KAPPALAMB, IGLFLC, URICACID, FREEKAPPAL, CEA, LDH, REFLABREPO in the last 52802 hours.    Lab Results - Last 18 Months   Lab Units 10/14/20  1023 08/19/20  1255 07/15/20  1255 07/15/20  1006 04/22/20  0816 04/13/20  1038 01/08/20  0923 12/18/19  1137 09/04/19  0825   IRON mcg/dL 90 83 56  --  72  --   --   --   --    TIBC mcg/dL 273* 256* 308  --  288*  --   --   --   --    IRON SATURATION % 33 32 18*  --  25  --   --   --   --    FERRITIN ng/mL 405.70* 639.60* 135.70  --  135.10  --   --   --   --    TSH uIU/mL 2.650  --   --  3.010  --  7.250* 3.590 3.640 2.570     Assessment/Plans:   **Anemia secondary to chronic kidney disease stage IV   ? Hemoglobin at 10.5 with hematocrit 31.9, hemoglobin remaining stable with the Procrit therapy monthly.  On average hemoglobin is remaining above 10 at this dosing; meets guidelines for procrit today.  ? GFR 20ml/min, stable  ? Iron saturation 33%, ferritin 405.70 ; stable   --We will continue to give Procrit 40,000 units subcu today as her hemoglobin is at 10.5 and hematocrit 31.9 and she meets guidelines with a GFR of 18 ml/min.  --We will continue given Procrit monthly when patient meets guidelines of hemoglobin less than 11 and hematocrit less than 33 and normal iron studies.       ** Infection status:   · No current issues   **Metabolic / Renal:   --Chronic kidney disease stage IV with a GFR of 20ml/min, she is followed by Dr. Davis nephrologist closely.   ** Endocrine:   --Diabetes mellitus followed by Dr. Patel.  Patient's nonfasting glucose today is at 208, nonfasting.  Her hgbA1c has improved to 6.6. She was encouraged to continue following with her endocrinologist.  --Hypothyroidism also managed by endocrinology with Synthroid. TSH at 2.650.   ** Coagulation / VTE prophylaxis:   - no current issues  ** GI:   -no current issues  ** Pulmonary:   -- no current issues  ** Cardiology:    -Hypertension controlled with medications. BP today at 164/76  ** Skin / Rash:   · No current issues   ** :   · No current issues   ** Neurologic:   · No current issues   ** Psychosocial:   · No current issues   ** Pain control:   - no current issue  ** Health Maintenance-as mentioned above      I spent *** minutes caring for Maria C on this date of service. This time includes time spent by me in the following activities: {2021TIMEACTIVITIES:84439}.     Sola Mallory, APRN

## 2021-02-17 ENCOUNTER — OFFICE VISIT (OUTPATIENT)
Dept: ONCOLOGY | Facility: CLINIC | Age: 61
End: 2021-02-17

## 2021-02-17 ENCOUNTER — INFUSION (OUTPATIENT)
Dept: ONCOLOGY | Facility: HOSPITAL | Age: 61
End: 2021-02-17

## 2021-02-17 ENCOUNTER — LAB (OUTPATIENT)
Dept: LAB | Facility: HOSPITAL | Age: 61
End: 2021-02-17

## 2021-02-17 VITALS
TEMPERATURE: 98 F | DIASTOLIC BLOOD PRESSURE: 84 MMHG | RESPIRATION RATE: 16 BRPM | HEART RATE: 64 BPM | SYSTOLIC BLOOD PRESSURE: 164 MMHG | WEIGHT: 293 LBS | HEIGHT: 66 IN | BODY MASS INDEX: 47.09 KG/M2 | OXYGEN SATURATION: 98 %

## 2021-02-17 VITALS
RESPIRATION RATE: 18 BRPM | BODY MASS INDEX: 53.92 KG/M2 | DIASTOLIC BLOOD PRESSURE: 62 MMHG | SYSTOLIC BLOOD PRESSURE: 159 MMHG | WEIGHT: 293 LBS | OXYGEN SATURATION: 99 % | HEART RATE: 57 BPM | TEMPERATURE: 96.9 F | HEIGHT: 62 IN

## 2021-02-17 DIAGNOSIS — N18.4 ANEMIA DUE TO STAGE 4 CHRONIC KIDNEY DISEASE (HCC): ICD-10-CM

## 2021-02-17 DIAGNOSIS — N18.4 STAGE 4 CHRONIC KIDNEY DISEASE (HCC): ICD-10-CM

## 2021-02-17 DIAGNOSIS — D63.1 ANEMIA DUE TO STAGE 4 CHRONIC KIDNEY DISEASE (HCC): ICD-10-CM

## 2021-02-17 DIAGNOSIS — D63.1 ANEMIA DUE TO STAGE 4 CHRONIC KIDNEY DISEASE (HCC): Primary | ICD-10-CM

## 2021-02-17 DIAGNOSIS — Z79.4 TYPE 2 DIABETES MELLITUS WITH STAGE 3 CHRONIC KIDNEY DISEASE, WITH LONG-TERM CURRENT USE OF INSULIN, UNSPECIFIED WHETHER STAGE 3A OR 3B CKD (HCC): ICD-10-CM

## 2021-02-17 DIAGNOSIS — E11.22 TYPE 2 DIABETES MELLITUS WITH STAGE 3 CHRONIC KIDNEY DISEASE, WITH LONG-TERM CURRENT USE OF INSULIN, UNSPECIFIED WHETHER STAGE 3A OR 3B CKD (HCC): ICD-10-CM

## 2021-02-17 DIAGNOSIS — E66.01 MORBID OBESITY (HCC): ICD-10-CM

## 2021-02-17 DIAGNOSIS — N18.4 ANEMIA DUE TO STAGE 4 CHRONIC KIDNEY DISEASE (HCC): Primary | ICD-10-CM

## 2021-02-17 DIAGNOSIS — N18.4 STAGE 4 CHRONIC KIDNEY DISEASE (HCC): Primary | ICD-10-CM

## 2021-02-17 DIAGNOSIS — N18.30 TYPE 2 DIABETES MELLITUS WITH STAGE 3 CHRONIC KIDNEY DISEASE, WITH LONG-TERM CURRENT USE OF INSULIN, UNSPECIFIED WHETHER STAGE 3A OR 3B CKD (HCC): ICD-10-CM

## 2021-02-17 LAB
ALBUMIN SERPL-MCNC: 3.3 G/DL (ref 3.5–5.2)
ALBUMIN/GLOB SERPL: 0.9 G/DL
ALP SERPL-CCNC: 89 U/L (ref 39–117)
ALT SERPL W P-5'-P-CCNC: 9 U/L (ref 1–33)
ANION GAP SERPL CALCULATED.3IONS-SCNC: 7 MMOL/L (ref 5–15)
AST SERPL-CCNC: 10 U/L (ref 1–32)
BASOPHILS # BLD AUTO: 0.04 10*3/MM3 (ref 0–0.2)
BASOPHILS NFR BLD AUTO: 0.4 % (ref 0–1.5)
BILIRUB SERPL-MCNC: 0.2 MG/DL (ref 0–1.2)
BUN SERPL-MCNC: 32 MG/DL (ref 8–23)
BUN/CREAT SERPL: 12.5 (ref 7–25)
CALCIUM SPEC-SCNC: 8.9 MG/DL (ref 8.6–10.5)
CHLORIDE SERPL-SCNC: 108 MMOL/L (ref 98–107)
CO2 SERPL-SCNC: 26 MMOL/L (ref 22–29)
CREAT SERPL-MCNC: 2.55 MG/DL (ref 0.57–1)
DEPRECATED RDW RBC AUTO: 51.7 FL (ref 37–54)
EOSINOPHIL # BLD AUTO: 0.27 10*3/MM3 (ref 0–0.4)
EOSINOPHIL NFR BLD AUTO: 2.7 % (ref 0.3–6.2)
ERYTHROCYTE [DISTWIDTH] IN BLOOD BY AUTOMATED COUNT: 15.2 % (ref 12.3–15.4)
FERRITIN SERPL-MCNC: 466.1 NG/ML (ref 13–150)
GFR SERPL CREATININE-BSD FRML MDRD: 19 ML/MIN/1.73
GLOBULIN UR ELPH-MCNC: 3.6 GM/DL
GLUCOSE SERPL-MCNC: 135 MG/DL (ref 65–99)
HCT VFR BLD AUTO: 32.3 % (ref 34–46.6)
HGB BLD-MCNC: 10.3 G/DL (ref 12–15.9)
HOLD SPECIMEN: NORMAL
IMM GRANULOCYTES # BLD AUTO: 0.06 10*3/MM3 (ref 0–0.05)
IMM GRANULOCYTES NFR BLD AUTO: 0.6 % (ref 0–0.5)
IRON 24H UR-MRATE: 66 MCG/DL (ref 37–145)
IRON SATN MFR SERPL: 22 % (ref 20–50)
LYMPHOCYTES # BLD AUTO: 1.92 10*3/MM3 (ref 0.7–3.1)
LYMPHOCYTES NFR BLD AUTO: 19.5 % (ref 19.6–45.3)
MCH RBC QN AUTO: 29.6 PG (ref 26.6–33)
MCHC RBC AUTO-ENTMCNC: 31.9 G/DL (ref 31.5–35.7)
MCV RBC AUTO: 92.8 FL (ref 79–97)
MONOCYTES # BLD AUTO: 0.54 10*3/MM3 (ref 0.1–0.9)
MONOCYTES NFR BLD AUTO: 5.5 % (ref 5–12)
NEUTROPHILS NFR BLD AUTO: 7.04 10*3/MM3 (ref 1.7–7)
NEUTROPHILS NFR BLD AUTO: 71.3 % (ref 42.7–76)
NRBC BLD AUTO-RTO: 0 /100 WBC (ref 0–0.2)
PLATELET # BLD AUTO: 212 10*3/MM3 (ref 140–450)
PMV BLD AUTO: 9.8 FL (ref 6–12)
POTASSIUM SERPL-SCNC: 4 MMOL/L (ref 3.5–5.2)
PROT SERPL-MCNC: 6.9 G/DL (ref 6–8.5)
RBC # BLD AUTO: 3.48 10*6/MM3 (ref 3.77–5.28)
SODIUM SERPL-SCNC: 141 MMOL/L (ref 136–145)
TIBC SERPL-MCNC: 297 MCG/DL (ref 298–536)
TRANSFERRIN SERPL-MCNC: 199 MG/DL (ref 200–360)
WBC # BLD AUTO: 9.87 10*3/MM3 (ref 3.4–10.8)

## 2021-02-17 PROCEDURE — 99213 OFFICE O/P EST LOW 20 MIN: CPT | Performed by: NURSE PRACTITIONER

## 2021-02-17 PROCEDURE — 82728 ASSAY OF FERRITIN: CPT

## 2021-02-17 PROCEDURE — 84466 ASSAY OF TRANSFERRIN: CPT

## 2021-02-17 PROCEDURE — 96372 THER/PROPH/DIAG INJ SC/IM: CPT

## 2021-02-17 PROCEDURE — 25010000002 EPOETIN ALFA-EPBX 40000 UNIT/ML SOLUTION: Performed by: NURSE PRACTITIONER

## 2021-02-17 PROCEDURE — 85025 COMPLETE CBC W/AUTO DIFF WBC: CPT

## 2021-02-17 PROCEDURE — 80053 COMPREHEN METABOLIC PANEL: CPT

## 2021-02-17 PROCEDURE — 83540 ASSAY OF IRON: CPT

## 2021-02-17 PROCEDURE — 36415 COLL VENOUS BLD VENIPUNCTURE: CPT

## 2021-02-17 RX ADMIN — EPOETIN ALFA-EPBX 40000 UNITS: 40000 INJECTION, SOLUTION INTRAVENOUS; SUBCUTANEOUS at 14:22

## 2021-02-17 NOTE — PROGRESS NOTES
[facility]  HEMATOLOGY & ONCOLOGY    W ONC [facility] HEMATOLOGY AND ONCOLOGY  [address]  [place] [address] [phone]    Patient Name: Maria C Shrestha  Encounter Date: 02/17/2021  YOB: 1960  Patient Number: [id number]    Chief Complaint   Patient presents with   • Anemia     Here for f/u       REASON FOR VISIT: Maria C Shrestha is a 60-year-old female here for follow-up of anemia due to chronic kidney disease stage IV.  She sees nephrologist Dr. Davis.  She has been receiving Procrit when she meets guidelines.  Her last Procrit injection was on 12/21/2020.  She has also had iron infusions in the past with the last infusions of Injectafer being on 7/17/2020 and 7/31/2020.    At last appointment patient had stated that Dr. Davis told her in December that he was going to get her ready for dialysis.  Today she states she has an appointment with Dr. Davis on April 7, and at that time they will discuss possible dialysis.    Patient states she is still having the same symptoms as she was experiencing at her last office visit which is extreme fatigue, generalized weakness, sinus congestion and allergies.  No sinus pressure upon physical examination.  Patient states she is taking Zyrtec and using Flonase twice daily.  She states her throat feels swollen in the back especially in the morning after using CPAP all night.  She states that the reservoir is dry by morning.  Patient has not seen her primary care physician Dr. Soliman regarding this issue.  Encouraged patient to make appointment to be seen by Dr. Soliman.  At last appointment diabetes management was discussed with patient.  She states she has been trying to make small changes in her diet since her last visit.  Still dealing with depression and problems managing her diabetes.  Patient has lost 6 pounds since last office visit which she states that she believes is  "fluid loss.  She does have lower extremity edema that is nonpitting.  She is still using a wheelchair to move around as she is not able to walk more than a few feet.  She denies abdominal pain, fever, chills, night sweats, diarrhea, constipation, nausea/vomiting.    Labs today include hemoglobin 10.3, hematocrit 32.3, GFR 19, iron saturation 22, ferritin 466.10, and platelets 212,000.  Patient meets guidelines to receive Procrit today.    PAST MEDICAL HISTORY:  ALLERGIES:  Allergies   Allergen Reactions   • Codeine Nausea Only       CURRENT MEDICATIONS:  Outpatient Encounter Medications as of 2/17/2021   Medication Sig Dispense Refill   • allopurinol (ZYLOPRIM) 100 MG tablet Take 100 mg by mouth 2 (Two) Times a Day.     • amLODIPine (NORVASC) 10 MG tablet Take 10 mg by mouth.     • aspirin 81 MG tablet Take 81 mg by mouth Daily. Stop 7/22/2018     • BD Insulin Syringe U/F 31G X 5/16\" 1 ML misc AS DIRECTED FOUR TIMES A  each 0   • busPIRone (BUSPAR) 10 MG tablet buspirone 10 mg tablet   Take 2 tablets twice a day by oral route as needed for 90 days.     • carvedilol (COREG) 6.25 MG tablet Take 6.25 mg by mouth 2 (Two) Times a Day With Meals.     • cetirizine (zyrTEC) 10 MG tablet Take 10 mg by mouth As Needed.     • Cholecalciferol (VITAMIN D3) 74289 units tablet Vitamin D2 50,000 unit capsule   Take 1 capsule every week by oral route. Sunday     • citalopram (CeleXA) 40 MG tablet Take 40 mg by mouth.     • diazePAM (VALIUM) 2 MG tablet Take 2 mg by mouth As Needed for Anxiety.     • Dulaglutide (Trulicity) 1.5 MG/0.5ML solution pen-injector Inject 1.5 mg under the skin into the appropriate area as directed Every 7 (Seven) Days. 6 mL 3   • Evolocumab 140 MG/ML solution auto-injector Inject 1 mL under the skin into the appropriate area as directed Every 14 (Fourteen) Days for 24 doses. NDC [address]-77 6 mL 10   • fluticasone (VERAMYST) 27.5 MCG/SPRAY nasal spray 2 sprays into each nostril Daily.     • Glucose " Blood (BLOOD GLUCOSE TEST) strip Use 4 x daily, use any brand covered by insurance or same brand as before 360 each 3   • insulin glargine (Lantus) 100 UNIT/ML injection INJECT 200 UNITS DAILY 60 mL 11   • Insulin Lispro (HumaLOG KwikPen) 200 UNIT/ML solution pen-injector Inject 80 Units under the skin into the appropriate area as directed 3 (Three) Times a Day With Meals. 24 pen 11   • Insulin Pen Needle (B-D ULTRAFINE III SHORT PEN) 31G X 8 MM misc USE AS DIRECTED TO INJECT FOUR TIMES DAILY 200 each 2   • lamoTRIgine (LaMICtal) 50 MG tablet dispersible disintegrating tablet Take 50 mg by mouth Every Night.     • Lancets (freestyle) lancets Use as instructed 4 x daily 150 each 11   • levothyroxine (Synthroid) 88 MCG tablet Take 1 tablet by mouth Daily. 30 tablet 11   • lisinopril (PRINIVIL,ZESTRIL) 40 MG tablet Take 40 mg by mouth 2 (Two) Times a Day.     • Multiple Vitamins-Minerals (MULTIVITAMIN ADULT PO) Take 1 tablet by mouth Daily.     • Omega-3 Fatty Acids (FISH OIL) 1000 MG capsule capsule Take 2,000 mg by mouth Daily With Dinner.     • potassium gluconate 595 (99 K) MG tablet tablet potassium gluconate 595 mg (99 mg) tablet   Take 1 tablets every day by oral route.     • rosuvastatin (CRESTOR) 10 MG tablet Take 10 mg by mouth Daily.     • sodium bicarbonate 650 MG tablet Take 650 mg by mouth 4 (Four) Times a Day.       No facility-administered encounter medications on file as of 2/17/2021.        ADULT ILLNESSES:  Patient Active Problem List   Diagnosis Code   • Type 2 diabetes mellitus with stage 3 chronic kidney disease, with long-term current use of insulin (CMS/[facility]) E11.22, N18.30, Z79.4   • Mixed diabetic hyperlipidemia associated with type 2 diabetes mellitus (CMS/[facility]) E11.69, E78.2   • Hypertension associated with diabetes (CMS/[facility]) E11.59, I15.2   • Vitamin D deficiency E55.9   • B12 deficiency E53.8   • Anemia in CKD (chronic kidney disease) N18.9, D63.1   • Acquired hypothyroidism E03.9   •  Chronic kidney disease, stage 4 (severe) (CMS/HCC) N18.4   • Anemia D64.9   • Anxiety F41.9   • Deep venous thrombosis of lower extremity (CMS/HCC) I82.409   • Disease of liver K76.9   • Embolism (CMS/HCC) I74.9   • Hypertriglyceridemia E78.1   • Hypocalcemia E83.51   • Mood disorder (CMS/HCC) F39   • Morbid obesity (CMS/HCC) E66.01   • Sleep apnea G47.30   • Acute renal failure superimposed on stage 3 chronic kidney disease (CMS/HCC) N17.9, N18.30   • Anemia of chronic renal failure N18.9, D63.1   • Hypertension I10   • Diabetes mellitus (CMS/HCC) E11.9   • Thyroid disease E07.9   • Hypothyroidism E03.9   • Vitamin D deficiency E55.9   • Anemia due to stage 4 chronic kidney disease (CMS/HCC) N18.4, D63.1   • Stage 4 chronic kidney disease (CMS/HCC) N18.4       SURGERIES:  Past Surgical History:   Procedure Laterality Date   • ADENOIDECTOMY     • ANAL FISTULA REPAIR      x 2    • CHOLECYSTECTOMY     • COLONOSCOPY  01/02/2014   • COLONOSCOPY N/A 3/22/2017    Procedure: COLONOSCOPY WITH ANESTHESIA;  Surgeon: Mono Linder MD;  Location: [facility] ENDOSCOPY;  Service:    • D&C HYSTEROSCOPY N/A 7/25/2018    Procedure: DILATATION AND CURETTAGE HYSTEROSCOPY;  Surgeon: Michael Castaneda MD;  Location: [facility] OR;  Service: Obstetrics/Gynecology   • DILATATION AND CURETTAGE      X 2   • ENDOSCOPY     • TONSILLECTOMY         HEALTH MAINTENANCE ITEMS:  <no information>  Last Completed Colonoscopy       Status Date      COLONOSCOPY Done 3/22/2017 COLONOSCOPY     Patient has more history with this topic...          Last Completed Mammogram       Status Date      MAMMOGRAM Done 8/12/2020 MAMMO SCREENING DIGITAL TOMOSYNTHESIS BILATERAL W CAD     Patient has more history with this topic...          FAMILY HISTORY:  Family History   Problem Relation Age of Onset   • Diabetes Other    • Heart failure Other    • Cancer Other    • Kidney disease Other    • Cancer Mother    • Cancer Father    • Heart disease Father    • Diabetes  Father    • Obesity Father    • Stroke Father    • Cancer Maternal Grandmother    • Uterine cancer Maternal Grandmother    • Diabetes Maternal Grandfather    • Cancer Paternal Grandmother    • Colon cancer Paternal Grandmother    • Diabetes Paternal Grandfather    • Heart disease Paternal Grandfather    • No Known Problems Sister    • No Known Problems Brother    • No Known Problems Daughter    • No Known Problems Maternal Aunt    • No Known Problems Paternal Aunt    • BRCA 1/2 Neg Hx    • Breast cancer Neg Hx    • Endometrial cancer Neg Hx    • Ovarian cancer Neg Hx        SOCIAL HISTORY:  Social History     Socioeconomic History   • Marital status:      Spouse name: Not on file   • Number of children: Not on file   • Years of education: Not on file   • Highest education level: Not on file   Tobacco Use   • Smoking status: Never Smoker   • Smokeless tobacco: Never Used   Substance and Sexual Activity   • Alcohol use: No   • Drug use: No   • Sexual activity: Never     Birth control/protection: Post-menopausal       REVIEW OF SYSTEMS:  Review of Systems   Constitutional: Positive for fatigue. Negative for activity change, appetite change, chills, diaphoresis, fever and unexpected weight loss.   HENT: Positive for congestion, postnasal drip and sore throat (mainly in am after sleeping with cpap all night. States resevoir is dry by morning.). Negative for ear pain, nosebleeds, sinus pressure and voice change.    Eyes: Negative for blurred vision, double vision, pain and visual disturbance.   Respiratory: Positive for shortness of breath (Upon exersion). Negative for cough.    Cardiovascular: Positive for leg swelling. Negative for chest pain and palpitations.   Gastrointestinal: Negative for abdominal pain, anal bleeding, blood in stool, constipation, diarrhea, nausea and vomiting.   Endocrine: Negative for heat intolerance, polydipsia and polyuria.   Genitourinary: Negative for dysuria, frequency, hematuria,  "urgency and urinary incontinence.   Musculoskeletal: Negative for arthralgias and myalgias.   Skin: Negative for rash and skin lesions.   Neurological: Positive for weakness. Negative for dizziness, tremors, seizures, syncope, speech difficulty and headache.   Hematological: Negative for adenopathy. Does not bruise/bleed easily.   Psychiatric/Behavioral: Positive for depressed mood. Negative for dysphoric mood, sleep disturbance and suicidal ideas.       /84   Pulse 64   Temp 98 °F (36.7 °C) (Temporal)   Resp 16   Ht 166.4 cm (65.5\")   Wt (!) 173 kg (380 lb 4.8 oz)   LMP  (LMP Unknown)   SpO2 98%   Breastfeeding No   BMI 62.32 kg/m²  Body surface area is 2.62 meters squared.  Pain Score    02/17/21 1316   PainSc: 0-No pain       Physical Exam:  Physical Exam  Constitutional:       General: She is awake.      Appearance: Normal appearance. She is well-developed and well-groomed. She is morbidly obese.   HENT:      Head: Normocephalic and atraumatic.      Nose: Nose normal.   Eyes:      General: No scleral icterus.     Pupils: Pupils are equal, round, and reactive to light.   Neck:      Musculoskeletal: Normal range of motion and neck supple.      Trachea: Trachea normal.   Cardiovascular:      Rate and Rhythm: Normal rate and regular rhythm.      Pulses: Normal pulses.      Heart sounds: Normal heart sounds. No murmur. No friction rub. No gallop.    Pulmonary:      Effort: Pulmonary effort is normal.      Breath sounds: Normal breath sounds. No wheezing, rhonchi or rales.   Abdominal:      Palpations: Abdomen is soft.      Tenderness: There is no abdominal tenderness. There is no guarding.   Musculoskeletal:      Right lower leg: Edema present.      Left lower leg: Edema present.   Lymphadenopathy:      Cervical: No cervical adenopathy.      Upper Body:      Right upper body: No supraclavicular adenopathy.      Left upper body: No supraclavicular adenopathy.   Skin:     General: Skin is warm and dry. "   Neurological:      General: No focal deficit present.      Mental Status: She is alert and oriented to person, place, and time.      Sensory: No sensory deficit.   Psychiatric:         Attention and Perception: Attention normal.         Mood and Affect: Mood and affect normal.         Speech: Speech normal.         Behavior: Behavior normal. Behavior is cooperative.         Thought Content: Thought content normal.         Cognition and Memory: Cognition normal.         Judgment: Judgment normal.         Maria C Shrestha reports a pain score of 0.  Given her pain assessment as noted, treatment options were discussed and the following options were decided upon as a follow-up plan to address the patient's pain: referral to Primary Care for assistance in pain treatment guidance.      Patient's Body mass index is 62.32 kg/m². BMI is above normal parameters. Recommendations include: referral to primary care.      LABS    Lab Results - Last 18 Months   Lab Units 02/17/21  1253 01/18/21  1321 12/21/20  1400 11/11/20  1302 10/14/20  1023 09/16/20  1244 08/19/20  1255   HEMOGLOBIN g/dL 10.3* 11.1* 10.5* 10.4* 10.7* 10.7* 10.6*   HEMATOCRIT % 32.3* 34.2 31.9* 32.2* 32.2* 32.9* 32.8*   MCV fL 92.8 92.4 93.0 93.1 93.1 94.0 94.8   WBC 10*3/mm3 9.87 9.96 10.46 8.81 7.90 9.13 8.53   RDW % 15.2 15.2 15.4 15.5* 15.4 16.0* 16.6*   MPV fL 9.8 10.4 10.5 10.2 9.5 10.2 10.4   PLATELETS 10*3/mm3 212 217 193 197 190 197 173   IMM GRAN % % 0.6* 0.8* 0.6* 0.5  --  0.4 0.5   NEUTROS ABS 10*3/mm3 7.04* 7.28* 7.75* 6.57 6.10 6.36 6.06   LYMPHS ABS 10*3/mm3 1.92 1.84 1.88 1.63 1.40 1.99 1.70   MONOS ABS 10*3/mm3 0.54 0.47 0.49 0.37  --  0.47 0.48   EOS ABS 10*3/mm3 0.27 0.24 0.25 0.17  --  0.23 0.22   BASOS ABS 10*3/mm3 0.04 0.05 0.03 0.03  --  0.04 0.03   IMMATURE GRANS (ABS) 10*3/mm3 0.06* 0.08* 0.06* 0.04  --  0.04 0.04   NRBC /100 WBC 0.0 0.0 0.0 0.0  --  0.0 0.0       Lab Results - Last 18 Months   Lab Units 02/17/21  1253 01/18/21  1321  12/21/20  1400 11/11/20  1302 10/14/20  1023 09/16/20  1244   GLUCOSE mg/dL 135* 280* 208* 175* 207* 164*   SODIUM mmol/L 141 139 141 141 135 140   POTASSIUM mmol/L 4.0 4.4 4.1 4.6 4.7 4.6   CO2 mmol/L 26.0 23.0 22.0 23.0 23.0* 20.0*   CHLORIDE mmol/L 108* 106 109* 108* 107 109*   ANION GAP mmol/L 7.0 10.0 10.0 10.0 5.0 11.0   CREATININE mg/dL 2.55* 2.84* 2.46* 2.68* 2.86* 2.87*   BUN mg/dL 32* 48* 50* 46* 51* 50*   BUN / CREAT RATIO  12.5 16.9 20.3 17.2 17.8 17.4   CALCIUM mg/dL 8.9 9.5 9.2 9.0 8.8 9.4   EGFR IF NONAFRICN AM mL/min/1.73 19* 17* 20* 18* 17* 17*   ALK PHOS U/L 89 102 82 77 78 66   TOTAL PROTEIN g/dL 6.9 7.5 7.0 6.6 6.6 6.7   ALT (SGPT) U/L 9 6 7 7 12 9   AST (SGOT) U/L 10 10 9 11 18 9   BILIRUBIN mg/dL 0.2 0.2 0.2 0.2 0.4 0.2   ALBUMIN g/dL 3.30* 3.60 3.80 3.70 3.50 3.80   GLOBULIN gm/dL 3.6 3.9 3.2 2.9 3.1 2.9       No results for input(s): MSPIKE, KAPPALAMB, IGLFLC, URICACID, FREEKAPPAL, CEA, LDH, REFLABREPO in the last 87722 hours.    Lab Results - Last 18 Months   Lab Units 02/17/21  1253 10/14/20  1023 08/19/20  1255 07/15/20  1255 07/15/20  1006 04/22/20  0816 04/13/20  1038 01/08/20  0923 12/18/19  1137 09/04/19  0825   IRON mcg/dL 66 90 83 56  --  72  --   --   --   --    TIBC mcg/dL 297* 273* 256* 308  --  288*  --   --   --   --    IRON SATURATION % 22 33 32 18*  --  25  --   --   --   --    FERRITIN ng/mL 466.10* 405.70* 639.60* 135.70  --  135.10  --   --   --   --    TSH uIU/mL  --  2.650  --   --  3.010  --  7.250* 3.590 3.640 2.570     Assessment    1.  Type 2 diabetes-patient currently taking dulaglutide, evolocumab, Humalog, and Lantus. Blood sugar on today's labs 135.  Patient has had a 6 pound weight loss since last visit 1 month ago.  Managed by endocrinologist Dr. Patel.    2.  Hypertension-patient currently taking amlodipine, carvedilol, and lisinopril.  Blood pressure in office today 164/84.  Managed by primary care physician.    3.  Anxiety-patient currently taking  citalopram, diazepam, lamotrigine.  Managed by primary care physician.    4.  Hypothyroidism-patient currently taking levothyroxine.  Managed by endocrinologist Dr. Patel.            .  5.  Hyperlipidemia-patient currently taking rosuvastatin.  Managed by primary care physician.     6.  Chronic kidney disease stage IV-GFR on today's labs 19. Managed by nephrologist Dr. Davis    7.  Anemia due to chronic kidney disease stage IV-today's labs shared with patient in office.  Hemoglobin 10.3, hematocrit 32.3, iron saturation 22, ferritin 466.10, platelets 212,000.  Patient is within the guidelines to receive Procrit today.  Patient received Procrit today.    Plan    1.  Continue to monitor labs monthly.    2.  Iron replacement as needed.    3.  Procrit to be given if meets guidelines.    4.  Patient to continue following up with primary care physician and specialists.    5.  Patient to return for office visit in 1 month with preoffice labs CBC and CMP.    I spent 20 minutes caring for Maria C on this date of service. This time includes time spent by me in the following activities: preparing for the visit, reviewing tests, performing a medically appropriate examination and/or evaluation, counseling and educating the patient/family/caregiver and documenting information in the medical record.

## 2021-02-24 ENCOUNTER — OFFICE VISIT (OUTPATIENT)
Dept: FAMILY MEDICINE CLINIC | Facility: CLINIC | Age: 61
End: 2021-02-24

## 2021-02-24 VITALS
SYSTOLIC BLOOD PRESSURE: 136 MMHG | BODY MASS INDEX: 48.82 KG/M2 | HEART RATE: 79 BPM | DIASTOLIC BLOOD PRESSURE: 75 MMHG | HEIGHT: 65 IN | TEMPERATURE: 96.6 F | WEIGHT: 293 LBS

## 2021-02-24 DIAGNOSIS — J01.91 ACUTE RECURRENT SINUSITIS, UNSPECIFIED LOCATION: ICD-10-CM

## 2021-02-24 DIAGNOSIS — G47.33 OBSTRUCTIVE SLEEP APNEA SYNDROME: Primary | ICD-10-CM

## 2021-02-24 PROCEDURE — 99214 OFFICE O/P EST MOD 30 MIN: CPT | Performed by: NURSE PRACTITIONER

## 2021-02-24 RX ORDER — AMOXICILLIN AND CLAVULANATE POTASSIUM 875; 125 MG/1; MG/1
1 TABLET, FILM COATED ORAL 2 TIMES DAILY
Qty: 20 TABLET | Refills: 0 | Status: SHIPPED | OUTPATIENT
Start: 2021-02-24 | End: 2021-03-17

## 2021-02-24 NOTE — ASSESSMENT & PLAN NOTE
Patient states that she currently uses a CPAP at night for obstructive sleep apnea.  She states that she has noticed that she has had more drainage and sinus pressure causing throat and uvula swelling.  She states that she feels like she is gagging or choking at times while she is trying to sleep.  She is concerned and wishes to be evaluated for this.  I will refer to ENT at this time.  I will also treat for a possible sinus infection, unable to do steroids due to her being an insulin-dependent diabetic.

## 2021-02-24 NOTE — PROGRESS NOTES
"Chief Complaint  Apnea    Subjective    History of Present Illness      Patient presents to [facility] for   Pt states she has an area to the back of her throat that has \"fallen down\" and is interfering with her breathing at night with her cpap. Pt first noticed this in the last month.     Mouth Lesions   The current episode started more than 1 week ago. The onset was gradual. The problem occurs continuously. The problem has been unchanged. The problem is moderate. Associated symptoms include rhinorrhea and sore throat. There is nasal congestion. The congestion interferes with sleep.        Review of Systems   Constitutional: Negative.    HENT: Positive for postnasal drip, rhinorrhea, sinus pressure and sore throat.    Eyes: Negative.    Respiratory: Positive for choking.    Cardiovascular: Negative.    Gastrointestinal: Negative.    Endocrine: Negative.    Genitourinary: Negative.    Musculoskeletal: Negative.    Skin: Negative.    Allergic/Immunologic: Negative.    Neurological: Negative.    Hematological: Negative.    Psychiatric/Behavioral: Positive for sleep disturbance.       I have reviewed and agree with the HPI and ROS information as above.  Janet Richardson, APRN     Objective   Vital Signs:   /75 (BP Location: Left arm)   Pulse 79   Temp 96.6 °F (35.9 °C)   Ht 165.1 cm (65\")   Wt (!) 172 kg (380 lb)   BMI 63.24 kg/m²       Physical Exam  Vitals signs and nursing note reviewed.   Constitutional:       Appearance: Normal appearance. She is well-developed.   HENT:      Head: Normocephalic and atraumatic.      Right Ear: Tympanic membrane, ear canal and external ear normal.      Left Ear: Tympanic membrane, ear canal and external ear normal.      Nose: Nose normal. No septal deviation, nasal tenderness or congestion.      Mouth/Throat:      Lips: Pink. No lesions.      Mouth: Mucous membranes are moist. No oral lesions.      Dentition: Normal dentition.      " Pharynx: Oropharynx is clear. Posterior oropharyngeal erythema and uvula swelling present. No pharyngeal swelling or oropharyngeal exudate.   Eyes:      General: Lids are normal. Vision grossly intact. No scleral icterus.        Right eye: No discharge.         Left eye: No discharge.      Extraocular Movements: Extraocular movements intact.      Conjunctiva/sclera: Conjunctivae normal.      Right eye: Right conjunctiva is not injected.      Left eye: Left conjunctiva is not injected.      Pupils: Pupils are equal, round, and reactive to light.   Neck:      Musculoskeletal: Full passive range of motion without pain, normal range of motion and neck supple.      Thyroid: No thyroid mass.      Trachea: Trachea normal.   Cardiovascular:      Rate and Rhythm: Normal rate and regular rhythm.      Heart sounds: Normal heart sounds. No murmur. No gallop.    Pulmonary:      Effort: Pulmonary effort is normal.      Breath sounds: Normal breath sounds and air entry. No wheezing, rhonchi or rales.   Musculoskeletal: Normal range of motion.         General: No tenderness or deformity.      Thoracic back: Normal.      Right lower leg: No edema.      Left lower leg: No edema.   Skin:     General: Skin is warm and dry.      Coloration: Skin is not jaundiced.      Findings: No rash.   Neurological:      Mental Status: She is alert and oriented to person, place, and time.      Cranial Nerves: Cranial nerves are intact.      Sensory: Sensation is intact.      Motor: Motor function is intact.      Coordination: Coordination is intact.      Gait: Gait is intact.      Deep Tendon Reflexes: Reflexes are normal and symmetric.   Psychiatric:         Mood and Affect: Mood and affect normal.         Behavior: Behavior normal.         Judgment: Judgment normal.                Assessment and Plan        Problem List Items Addressed This Visit        Sleep    Sleep apnea - Primary    Current Assessment & Plan     Patient states that she  currently uses a CPAP at night for obstructive sleep apnea.  She states that she has noticed that she has had more drainage and sinus pressure causing throat and uvula swelling.  She states that she feels like she is gagging or choking at times while she is trying to sleep.  She is concerned and wishes to be evaluated for this.  I will refer to ENT at this time.  I will also treat for a possible sinus infection, unable to do steroids due to her being an insulin-dependent diabetic.         Relevant Orders    Ambulatory Referral to ENT (Otolaryngology)      Other Visit Diagnoses     Acute recurrent sinusitis, unspecified location        Relevant Medications    amoxicillin-clavulanate (Augmentin) 875-125 MG per tablet            Follow Up   Return if symptoms worsen or fail to improve.  Patient was given instructions and counseling regarding her condition or for health maintenance advice. Please see specific information pulled into the AVS if appropriate.

## 2021-03-16 DIAGNOSIS — N18.4 STAGE 4 CHRONIC KIDNEY DISEASE (HCC): Primary | ICD-10-CM

## 2021-03-16 DIAGNOSIS — N18.4 ANEMIA DUE TO STAGE 4 CHRONIC KIDNEY DISEASE (HCC): ICD-10-CM

## 2021-03-16 DIAGNOSIS — D63.1 ANEMIA DUE TO STAGE 4 CHRONIC KIDNEY DISEASE (HCC): ICD-10-CM

## 2021-03-17 ENCOUNTER — LAB (OUTPATIENT)
Dept: LAB | Facility: HOSPITAL | Age: 61
End: 2021-03-17

## 2021-03-17 ENCOUNTER — OFFICE VISIT (OUTPATIENT)
Dept: ONCOLOGY | Facility: CLINIC | Age: 61
End: 2021-03-17

## 2021-03-17 ENCOUNTER — INFUSION (OUTPATIENT)
Dept: ONCOLOGY | Facility: HOSPITAL | Age: 61
End: 2021-03-17

## 2021-03-17 VITALS
OXYGEN SATURATION: 98 % | SYSTOLIC BLOOD PRESSURE: 152 MMHG | BODY MASS INDEX: 48.82 KG/M2 | DIASTOLIC BLOOD PRESSURE: 82 MMHG | WEIGHT: 293 LBS | HEIGHT: 65 IN | RESPIRATION RATE: 16 BRPM | TEMPERATURE: 97.6 F | HEART RATE: 64 BPM

## 2021-03-17 VITALS
RESPIRATION RATE: 16 BRPM | DIASTOLIC BLOOD PRESSURE: 41 MMHG | TEMPERATURE: 97.1 F | HEIGHT: 65 IN | OXYGEN SATURATION: 100 % | BODY MASS INDEX: 48.82 KG/M2 | HEART RATE: 64 BPM | WEIGHT: 293 LBS | SYSTOLIC BLOOD PRESSURE: 140 MMHG

## 2021-03-17 DIAGNOSIS — N18.4 TYPE 2 DIABETES MELLITUS WITH STAGE 4 CHRONIC KIDNEY DISEASE, WITH LONG-TERM CURRENT USE OF INSULIN (HCC): ICD-10-CM

## 2021-03-17 DIAGNOSIS — D63.1 ANEMIA OF CHRONIC KIDNEY FAILURE, STAGE 4 (SEVERE) (HCC): ICD-10-CM

## 2021-03-17 DIAGNOSIS — D63.1 ANEMIA DUE TO STAGE 4 CHRONIC KIDNEY DISEASE (HCC): ICD-10-CM

## 2021-03-17 DIAGNOSIS — E78.1 HYPERTRIGLYCERIDEMIA: ICD-10-CM

## 2021-03-17 DIAGNOSIS — E11.59 HYPERTENSION ASSOCIATED WITH DIABETES (HCC): ICD-10-CM

## 2021-03-17 DIAGNOSIS — D63.1 ANEMIA IN STAGE 4 CHRONIC KIDNEY DISEASE (HCC): ICD-10-CM

## 2021-03-17 DIAGNOSIS — N18.4 CHRONIC KIDNEY DISEASE, STAGE 4 (SEVERE) (HCC): ICD-10-CM

## 2021-03-17 DIAGNOSIS — N18.4 STAGE 4 CHRONIC KIDNEY DISEASE (HCC): ICD-10-CM

## 2021-03-17 DIAGNOSIS — N18.4 CHRONIC KIDNEY DISEASE, STAGE 4 (SEVERE) (HCC): Primary | ICD-10-CM

## 2021-03-17 DIAGNOSIS — N18.4 ANEMIA OF CHRONIC KIDNEY FAILURE, STAGE 4 (SEVERE) (HCC): ICD-10-CM

## 2021-03-17 DIAGNOSIS — D63.1 ANEMIA IN STAGE 4 CHRONIC KIDNEY DISEASE (HCC): Primary | ICD-10-CM

## 2021-03-17 DIAGNOSIS — Z79.4 TYPE 2 DIABETES MELLITUS WITH STAGE 4 CHRONIC KIDNEY DISEASE, WITH LONG-TERM CURRENT USE OF INSULIN (HCC): ICD-10-CM

## 2021-03-17 DIAGNOSIS — N18.4 ANEMIA IN STAGE 4 CHRONIC KIDNEY DISEASE (HCC): ICD-10-CM

## 2021-03-17 DIAGNOSIS — N18.4 ANEMIA IN STAGE 4 CHRONIC KIDNEY DISEASE (HCC): Primary | ICD-10-CM

## 2021-03-17 DIAGNOSIS — E66.8 EXTREME OBESITY: ICD-10-CM

## 2021-03-17 DIAGNOSIS — E11.22 TYPE 2 DIABETES MELLITUS WITH STAGE 4 CHRONIC KIDNEY DISEASE, WITH LONG-TERM CURRENT USE OF INSULIN (HCC): ICD-10-CM

## 2021-03-17 DIAGNOSIS — N18.4 ANEMIA DUE TO STAGE 4 CHRONIC KIDNEY DISEASE (HCC): ICD-10-CM

## 2021-03-17 DIAGNOSIS — I15.2 HYPERTENSION ASSOCIATED WITH DIABETES (HCC): ICD-10-CM

## 2021-03-17 LAB
25(OH)D3 SERPL-MCNC: 28 NG/ML
ALBUMIN SERPL-MCNC: 3.1 G/DL (ref 3.5–5.2)
ALBUMIN SERPL-MCNC: 3.3 G/DL (ref 3.5–5)
ALBUMIN UR-MCNC: >440 MG/DL
ALBUMIN/GLOB SERPL: 0.9 G/DL
ALBUMIN/GLOB SERPL: 1 G/DL (ref 1.1–2.5)
ALP SERPL-CCNC: 89 U/L (ref 24–120)
ALP SERPL-CCNC: 89 U/L (ref 39–117)
ALT SERPL W P-5'-P-CCNC: 10 U/L (ref 0–35)
ALT SERPL W P-5'-P-CCNC: 9 U/L (ref 1–33)
ANION GAP SERPL CALCULATED.3IONS-SCNC: 12 MMOL/L (ref 5–15)
ANION GAP SERPL CALCULATED.3IONS-SCNC: 8 MMOL/L (ref 4–13)
AST SERPL-CCNC: 14 U/L (ref 1–32)
AST SERPL-CCNC: 18 U/L (ref 7–45)
AUTO MIXED CELLS #: 0.4 10*3/MM3 (ref 0.1–2.6)
AUTO MIXED CELLS %: 5 % (ref 0.1–24)
BASOPHILS # BLD AUTO: 0.04 10*3/MM3 (ref 0–0.2)
BASOPHILS NFR BLD AUTO: 0.4 % (ref 0–1.5)
BILIRUB SERPL-MCNC: 0.2 MG/DL (ref 0.1–1)
BILIRUB SERPL-MCNC: <0.2 MG/DL (ref 0–1.2)
BUN SERPL-MCNC: 33 MG/DL (ref 5–21)
BUN SERPL-MCNC: 34 MG/DL (ref 8–23)
BUN/CREAT SERPL: 12
BUN/CREAT SERPL: 12.4 (ref 7–25)
CALCIUM SPEC-SCNC: 8.1 MG/DL (ref 8.6–10.5)
CALCIUM SPEC-SCNC: 8.5 MG/DL (ref 8.4–10.4)
CALCIUM SPEC-SCNC: 8.5 MG/DL (ref 8.6–10.5)
CHLORIDE SERPL-SCNC: 107 MMOL/L (ref 98–107)
CHLORIDE SERPL-SCNC: 108 MMOL/L (ref 98–110)
CHOLEST SERPL-MCNC: 179 MG/DL (ref 130–200)
CO2 SERPL-SCNC: 22 MMOL/L (ref 22–29)
CO2 SERPL-SCNC: 22 MMOL/L (ref 24–31)
CREAT SERPL-MCNC: 2.74 MG/DL (ref 0.5–1.4)
CREAT SERPL-MCNC: 2.75 MG/DL (ref 0.57–1)
CREAT UR-MCNC: 69.7 MG/DL
DEPRECATED RDW RBC AUTO: 51.1 FL (ref 37–54)
EOSINOPHIL # BLD AUTO: 0.26 10*3/MM3 (ref 0–0.4)
EOSINOPHIL NFR BLD AUTO: 2.8 % (ref 0.3–6.2)
ERYTHROCYTE [DISTWIDTH] IN BLOOD BY AUTOMATED COUNT: 14.8 % (ref 12.3–15.4)
ERYTHROCYTE [DISTWIDTH] IN BLOOD BY AUTOMATED COUNT: 15.3 % (ref 12.3–15.4)
FERRITIN SERPL-MCNC: 322.4 NG/ML (ref 13–150)
GFR SERPL CREATININE-BSD FRML MDRD: 18 ML/MIN/1.73
GFR SERPL CREATININE-BSD FRML MDRD: 18 ML/MIN/1.73
GLOBULIN UR ELPH-MCNC: 3.3 GM/DL
GLOBULIN UR ELPH-MCNC: 3.4 GM/DL
GLUCOSE SERPL-MCNC: 145 MG/DL (ref 70–100)
GLUCOSE SERPL-MCNC: 207 MG/DL (ref 65–99)
HBA1C MFR BLD: 7.5 % (ref 4.8–5.9)
HCT VFR BLD AUTO: 29.8 % (ref 34–46.6)
HCT VFR BLD AUTO: 30.6 % (ref 34–46.6)
HDLC SERPL-MCNC: 41 MG/DL
HGB BLD-MCNC: 9.8 G/DL (ref 12–15.9)
HGB BLD-MCNC: 9.9 G/DL (ref 12–15.9)
IMM GRANULOCYTES # BLD AUTO: 0.04 10*3/MM3 (ref 0–0.05)
IMM GRANULOCYTES NFR BLD AUTO: 0.4 % (ref 0–0.5)
IRON 24H UR-MRATE: 42 MCG/DL (ref 37–145)
IRON SATN MFR SERPL: 16 % (ref 20–50)
LDLC SERPL CALC-MCNC: 81 MG/DL (ref 0–99)
LDLC/HDLC SERPL: 1.63 {RATIO}
LYMPHOCYTES # BLD AUTO: 1.72 10*3/MM3 (ref 0.7–3.1)
LYMPHOCYTES # BLD AUTO: 1.9 10*3/MM3 (ref 0.7–3.1)
LYMPHOCYTES NFR BLD AUTO: 18.4 % (ref 19.6–45.3)
LYMPHOCYTES NFR BLD AUTO: 21.8 % (ref 19.6–45.3)
MCH RBC QN AUTO: 29.6 PG (ref 26.6–33)
MCH RBC QN AUTO: 30.3 PG (ref 26.6–33)
MCHC RBC AUTO-ENTMCNC: 32.4 G/DL (ref 31.5–35.7)
MCHC RBC AUTO-ENTMCNC: 32.9 G/DL (ref 31.5–35.7)
MCV RBC AUTO: 91.6 FL (ref 79–97)
MCV RBC AUTO: 92.3 FL (ref 79–97)
MICROALBUMIN/CREAT UR: NORMAL MG/G{CREAT}
MONOCYTES # BLD AUTO: 0.44 10*3/MM3 (ref 0.1–0.9)
MONOCYTES NFR BLD AUTO: 4.7 % (ref 5–12)
NEUTROPHILS NFR BLD AUTO: 6.3 10*3/MM3 (ref 1.7–7)
NEUTROPHILS NFR BLD AUTO: 6.86 10*3/MM3 (ref 1.7–7)
NEUTROPHILS NFR BLD AUTO: 73.2 % (ref 42.7–76)
NEUTROPHILS NFR BLD AUTO: 73.3 % (ref 42.7–76)
NRBC BLD AUTO-RTO: 0 /100 WBC (ref 0–0.2)
PLATELET # BLD AUTO: 186 10*3/MM3 (ref 140–450)
PLATELET # BLD AUTO: 220 10*3/MM3 (ref 140–450)
PMV BLD AUTO: 10.2 FL (ref 6–12)
PMV BLD AUTO: 8.9 FL (ref 6–12)
POTASSIUM SERPL-SCNC: 3.8 MMOL/L (ref 3.5–5.3)
POTASSIUM SERPL-SCNC: 4 MMOL/L (ref 3.5–5.2)
PROT SERPL-MCNC: 6.4 G/DL (ref 6–8.5)
PROT SERPL-MCNC: 6.7 G/DL (ref 6.3–8.7)
PTH-INTACT SERPL-MCNC: 169 PG/ML (ref 15–65)
RBC # BLD AUTO: 3.23 10*6/MM3 (ref 3.77–5.28)
RBC # BLD AUTO: 3.34 10*6/MM3 (ref 3.77–5.28)
SODIUM SERPL-SCNC: 138 MMOL/L (ref 135–145)
SODIUM SERPL-SCNC: 141 MMOL/L (ref 136–145)
TIBC SERPL-MCNC: 261 MCG/DL (ref 298–536)
TRANSFERRIN SERPL-MCNC: 175 MG/DL (ref 200–360)
TRIGL SERPL-MCNC: 355 MG/DL (ref 0–149)
TSH SERPL DL<=0.05 MIU/L-ACNC: 4.94 UIU/ML (ref 0.27–4.2)
VIT B12 BLD-MCNC: 551 PG/ML (ref 211–946)
VLDLC SERPL-MCNC: 57 MG/DL (ref 5–40)
WBC # BLD AUTO: 8.6 10*3/MM3 (ref 3.4–10.8)
WBC # BLD AUTO: 9.36 10*3/MM3 (ref 3.4–10.8)

## 2021-03-17 PROCEDURE — 84443 ASSAY THYROID STIM HORMONE: CPT | Performed by: INTERNAL MEDICINE

## 2021-03-17 PROCEDURE — 36415 COLL VENOUS BLD VENIPUNCTURE: CPT | Performed by: INTERNAL MEDICINE

## 2021-03-17 PROCEDURE — 96365 THER/PROPH/DIAG IV INF INIT: CPT

## 2021-03-17 PROCEDURE — 83036 HEMOGLOBIN GLYCOSYLATED A1C: CPT | Performed by: INTERNAL MEDICINE

## 2021-03-17 PROCEDURE — 99213 OFFICE O/P EST LOW 20 MIN: CPT | Performed by: NURSE PRACTITIONER

## 2021-03-17 PROCEDURE — 80061 LIPID PANEL: CPT | Performed by: INTERNAL MEDICINE

## 2021-03-17 PROCEDURE — 84466 ASSAY OF TRANSFERRIN: CPT

## 2021-03-17 PROCEDURE — 83970 ASSAY OF PARATHORMONE: CPT | Performed by: INTERNAL MEDICINE

## 2021-03-17 PROCEDURE — 82570 ASSAY OF URINE CREATININE: CPT | Performed by: INTERNAL MEDICINE

## 2021-03-17 PROCEDURE — 85025 COMPLETE CBC W/AUTO DIFF WBC: CPT

## 2021-03-17 PROCEDURE — 82728 ASSAY OF FERRITIN: CPT

## 2021-03-17 PROCEDURE — 80053 COMPREHEN METABOLIC PANEL: CPT

## 2021-03-17 PROCEDURE — 82607 VITAMIN B-12: CPT | Performed by: INTERNAL MEDICINE

## 2021-03-17 PROCEDURE — 83540 ASSAY OF IRON: CPT

## 2021-03-17 PROCEDURE — 85025 COMPLETE CBC W/AUTO DIFF WBC: CPT | Performed by: INTERNAL MEDICINE

## 2021-03-17 PROCEDURE — 80053 COMPREHEN METABOLIC PANEL: CPT | Performed by: INTERNAL MEDICINE

## 2021-03-17 PROCEDURE — 82043 UR ALBUMIN QUANTITATIVE: CPT | Performed by: INTERNAL MEDICINE

## 2021-03-17 PROCEDURE — 82306 VITAMIN D 25 HYDROXY: CPT | Performed by: INTERNAL MEDICINE

## 2021-03-17 PROCEDURE — 25010000002 FERRIC CARBOXYMALTOSE 750 MG/15ML SOLUTION 15 ML VIAL: Performed by: NURSE PRACTITIONER

## 2021-03-17 RX ORDER — FAMOTIDINE 10 MG/ML
20 INJECTION, SOLUTION INTRAVENOUS AS NEEDED
Status: CANCELLED | OUTPATIENT
Start: 2021-03-17

## 2021-03-17 RX ORDER — DIPHENHYDRAMINE HYDROCHLORIDE 50 MG/ML
50 INJECTION INTRAMUSCULAR; INTRAVENOUS AS NEEDED
Status: CANCELLED | OUTPATIENT
Start: 2021-03-17

## 2021-03-17 RX ORDER — SODIUM CHLORIDE 9 MG/ML
250 INJECTION, SOLUTION INTRAVENOUS ONCE
Status: COMPLETED | OUTPATIENT
Start: 2021-03-17 | End: 2021-03-17

## 2021-03-17 RX ORDER — SODIUM CHLORIDE 9 MG/ML
250 INJECTION, SOLUTION INTRAVENOUS ONCE
Status: CANCELLED | OUTPATIENT
Start: 2021-03-17

## 2021-03-17 RX ADMIN — FERRIC CARBOXYMALTOSE INJECTION 750 MG: 50 INJECTION, SOLUTION INTRAVENOUS at 14:58

## 2021-03-17 RX ADMIN — SODIUM CHLORIDE 250 ML: 9 INJECTION, SOLUTION INTRAVENOUS at 14:58

## 2021-03-17 NOTE — PROGRESS NOTES
[facility]  HEMATOLOGY & ONCOLOGY    W ONC [facility] HEMATOLOGY AND ONCOLOGY  [address]  [place] [address] [phone]    Patient Name: Maria C Shrestha  Encounter Date: 03/17/2021  YOB: 1960  Patient Number: [id number]    Chief Complaint   Patient presents with   • Anemia     Here for f/u       REASON FOR VISIT: Maria C Shrestha is a 60-year-old female here for follow-up of anemia due to chronic kidney disease stage IV.  She sees nephrologist Dr. Davis.  She has been receiving Procrit when she meets guidelines.  Her last Procrit injection was on 12/21/2020.  She has also had iron infusions in the past with the last infusions of Injectafer being on 7/17/2020 and 7/31/2020.    At last appointment patient had stated that Dr. Davis told her in December that he was going to get her ready for dialysis.  Today she states she has an appointment with Dr. Davis on April 7, and at that time they will discuss possible dialysis.    Patient states she is still having the same symptoms as she was experiencing at her last office visit which is extreme fatigue, generalized weakness, sinus congestion and allergies.  No sinus pressure upon physical examination.  Patient states she is taking Zyrtec and using Flonase twice daily.  She states her throat feels swollen in the back especially in the morning after using CPAP all night.  She states that the reservoir is dry by morning.  Patient has not seen her primary care physician Dr. Soliman regarding this issue.  Encouraged patient to make appointment to be seen by Dr. Soliman.  At last appointment diabetes management was discussed with patient.  She states she has been trying to make small changes in her diet since her last visit.  Still dealing with depression and problems managing her diabetes.  Patient has lost 6 pounds since last office visit which she states that she believes is  fluid loss.  She does have lower extremity edema that is nonpitting.  She is still using a wheelchair to move around as she is not able to walk more than a few feet.  She denies abdominal pain, fever, chills, night sweats, diarrhea, constipation, nausea/vomiting.    Labs today include hemoglobin 10.3, hematocrit 32.3, GFR 19, iron saturation 22, ferritin 466.10, and platelets 212,000.  Patient meets guidelines to receive Procrit today.    On follow up 3/17/2021: Patient last received Procrit on 2/17/2021. Patient is seeing ENT next month for throat issues. States she has gained 10 pounds since last visit, and is holding a lot fluid.  Lung sounds are clear, normal heart sounds heard, and she does have 1+ pitting edema in her lower extremities bilaterally.  She is scheduled to see nephrologist Dr. Davis next week.  She states she still has fatigue specially upon walking short distances, and still has shortness of breath with exertion.  Denies chills, fever, nausea/vomiting.    Today's labs shared with patient in office.  Hemoglobin 9.8, hematocrit 29.8, platelets 186,000, blood glucose 207, iron saturation 16, ferritin 322.40.  As patient's iron saturation is less than 20 patient is not eligible for Procrit today.  Patient is to receive 1 dose of Injectafer 750 mg today.  Patient has had Injectafer in the past as she was unable to tolerate oral iron pills.  Will reassess labs in 1 month with possible Procrit injection.        2/17/21   Procrit 40,000 units                     3/17/21 Injectafer 750mg                     PAST MEDICAL HISTORY:  ALLERGIES:  Allergies   Allergen Reactions   • Codeine Nausea Only       CURRENT MEDICATIONS:  Outpatient Encounter Medications as of 3/17/2021   Medication Sig Dispense Refill   • allopurinol (ZYLOPRIM) 100 MG tablet Take 100 mg by mouth 2 (Two) Times a Day.     • amLODIPine (NORVASC) 10 MG tablet Take 10 mg by mouth.     • aspirin 81 MG tablet Take 81 mg by mouth Daily.  "Stop 7/22/2018     • BD Insulin Syringe U/F 31G X 5/16\" 1 ML misc AS DIRECTED FOUR TIMES A  each 0   • busPIRone (BUSPAR) 10 MG tablet buspirone 10 mg tablet   Take 2 tablets twice a day by oral route as needed for 90 days.     • carvedilol (COREG) 6.25 MG tablet Take 6.25 mg by mouth 2 (Two) Times a Day With Meals.     • cetirizine (zyrTEC) 10 MG tablet Take 10 mg by mouth As Needed.     • Cholecalciferol (VITAMIN D3) 58834 units tablet Take 1 capsule by mouth 2 (Two) Times a Week.     • citalopram (CeleXA) 40 MG tablet Take 40 mg by mouth.     • diazePAM (VALIUM) 2 MG tablet Take 2 mg by mouth As Needed for Anxiety.     • Dulaglutide (Trulicity) 1.5 MG/0.5ML solution pen-injector Inject 1.5 mg under the skin into the appropriate area as directed Every 7 (Seven) Days. 6 mL 3   • Evolocumab 140 MG/ML solution auto-injector Inject 1 mL under the skin into the appropriate area as directed Every 14 (Fourteen) Days for 24 doses. NDC [address]-04 6 mL 10   • fluticasone (VERAMYST) 27.5 MCG/SPRAY nasal spray 2 sprays into each nostril Daily.     • Glucose Blood (BLOOD GLUCOSE TEST) strip Use 4 x daily, use any brand covered by insurance or same brand as before 360 each 3   • insulin glargine (Lantus) 100 UNIT/ML injection INJECT 200 UNITS DAILY (Patient taking differently: Inject 90 Units under the skin into the appropriate area as directed Daily. INJECT 200 UNITS DAILY) 60 mL 11   • Insulin Lispro (HumaLOG KwikPen) 200 UNIT/ML solution pen-injector Inject 80 Units under the skin into the appropriate area as directed 3 (Three) Times a Day With Meals. 24 pen 11   • Insulin Pen Needle (B-D ULTRAFINE III SHORT PEN) 31G X 8 MM misc USE AS DIRECTED TO INJECT FOUR TIMES DAILY 200 each 2   • lamoTRIgine (LaMICtal) 50 MG tablet dispersible disintegrating tablet Take 50 mg by mouth Every Night.     • Lancets (freestyle) lancets Use as instructed 4 x daily 150 each 11   • levothyroxine (Synthroid) 88 MCG tablet Take 1 " tablet by mouth Daily. 30 tablet 11   • lisinopril (PRINIVIL,ZESTRIL) 40 MG tablet Take 40 mg by mouth 2 (Two) Times a Day.     • Multiple Vitamins-Minerals (MULTIVITAMIN ADULT PO) Take 1 tablet by mouth Daily.     • Omega-3 Fatty Acids (FISH OIL) 1000 MG capsule capsule Take 2,000 mg by mouth Daily With Dinner.     • potassium gluconate 595 (99 K) MG tablet tablet potassium gluconate 595 mg (99 mg) tablet   Take 1 tablets every day by oral route.     • rosuvastatin (CRESTOR) 10 MG tablet Take 10 mg by mouth Daily.     • sodium bicarbonate 650 MG tablet Take 1,300 mg by mouth 3 (Three) Times a Day.     • [DISCONTINUED] amoxicillin-clavulanate (Augmentin) 875-125 MG per tablet Take 1 tablet by mouth 2 (Two) Times a Day. 20 tablet 0     No facility-administered encounter medications on file as of 3/17/2021.       ADULT ILLNESSES:  Patient Active Problem List   Diagnosis Code   • Type 2 diabetes mellitus with stage 3 chronic kidney disease, with long-term current use of insulin (CMS/[facility]) E11.22, N18.30, Z79.4   • Mixed diabetic hyperlipidemia associated with type 2 diabetes mellitus (CMS/[facility]) E11.69, E78.2   • Hypertension associated with diabetes (CMS/[facility]) E11.59, I15.2   • Vitamin D deficiency E55.9   • B12 deficiency E53.8   • Anemia in CKD (chronic kidney disease) N18.9, D63.1   • Acquired hypothyroidism E03.9   • Chronic kidney disease, stage 4 (severe) (CMS/[facility]) N18.4   • Anemia D64.9   • Anxiety F41.9   • Deep venous thrombosis of lower extremity (CMS/[facility]) I82.409   • Disease of liver K76.9   • Embolism (CMS/[facility]) I74.9   • Hypertriglyceridemia E78.1   • Hypocalcemia E83.51   • Mood disorder (CMS/[facility]) F39   • Morbid obesity (CMS/[facility]) E66.01   • Sleep apnea G47.30   • Acute renal failure superimposed on stage 3 chronic kidney disease (CMS/HCC) N17.9, N18.30   • Anemia of chronic renal failure N18.9, D63.1   • Hypertension I10   • Diabetes mellitus (CMS/HCC) E11.9   • Thyroid disease E07.9   • Hypothyroidism E03.9    • Vitamin D deficiency E55.9   • Anemia due to stage 4 chronic kidney disease (CMS/HCC) N18.4, D63.1   • Stage 4 chronic kidney disease (CMS/HCC) N18.4       SURGERIES:  Past Surgical History:   Procedure Laterality Date   • ADENOIDECTOMY     • ANAL FISTULA REPAIR      x 2    • CHOLECYSTECTOMY     • COLONOSCOPY  01/02/2014   • COLONOSCOPY N/A 3/22/2017    Procedure: COLONOSCOPY WITH ANESTHESIA;  Surgeon: Mono Linder MD;  Location: [facility] ENDOSCOPY;  Service:    • D & C HYSTEROSCOPY N/A 7/25/2018    Procedure: DILATATION AND CURETTAGE HYSTEROSCOPY;  Surgeon: Michael Castaneda MD;  Location: [facility] OR;  Service: Obstetrics/Gynecology   • DILATATION AND CURETTAGE      X 2   • ENDOSCOPY     • TONSILLECTOMY         HEALTH MAINTENANCE ITEMS:  <no information>  Last Completed Colonoscopy       Status Date      COLONOSCOPY Done 3/22/2017 Surg:COLONOSCOPY     Patient has more history with this topic...          Last Completed Mammogram       Status Date      MAMMOGRAM Done 8/12/2020 MAMMO SCREENING DIGITAL TOMOSYNTHESIS BILATERAL W CAD     Patient has more history with this topic...          FAMILY HISTORY:  Family History   Problem Relation Age of Onset   • Diabetes Other    • Heart failure Other    • Cancer Other    • Kidney disease Other    • Cancer Mother    • Cancer Father    • Heart disease Father    • Diabetes Father    • Obesity Father    • Stroke Father    • Cancer Maternal Grandmother    • Uterine cancer Maternal Grandmother    • Diabetes Maternal Grandfather    • Cancer Paternal Grandmother    • Colon cancer Paternal Grandmother    • Diabetes Paternal Grandfather    • Heart disease Paternal Grandfather    • No Known Problems Sister    • No Known Problems Brother    • No Known Problems Daughter    • No Known Problems Maternal Aunt    • No Known Problems Paternal Aunt    • BRCA 1/2 Neg Hx    • Breast cancer Neg Hx    • Endometrial cancer Neg Hx    • Ovarian cancer Neg Hx        SOCIAL HISTORY:  Social  History     Socioeconomic History   • Marital status:      Spouse name: Not on file   • Number of children: Not on file   • Years of education: Not on file   • Highest education level: Not on file   Tobacco Use   • Smoking status: Never Smoker   • Smokeless tobacco: Never Used   Substance and Sexual Activity   • Alcohol use: No   • Drug use: No   • Sexual activity: Never     Birth control/protection: Post-menopausal       REVIEW OF SYSTEMS:  Review of Systems   Constitutional: Positive for fatigue (Patient in wheelchair, and only able to walk short distances). Negative for activity change, appetite change, chills, diaphoresis, fever and unexpected weight loss.   HENT: Negative for congestion, ear pain, nosebleeds, postnasal drip, sinus pressure, sore throat (mainly in am after sleeping with cpap all night. States resevoir is dry by morning.) and voice change.    Eyes: Negative for blurred vision, double vision, pain and visual disturbance.   Respiratory: Positive for shortness of breath (Upon exersion). Negative for cough.    Cardiovascular: Positive for leg swelling. Negative for chest pain and palpitations.   Gastrointestinal: Negative for abdominal pain, anal bleeding, blood in stool, constipation, diarrhea, nausea and vomiting.   Endocrine: Negative for heat intolerance, polydipsia and polyuria.   Genitourinary: Positive for frequency and urgency (States she has 3-4 accidents per week). Negative for dysuria, hematuria and urinary incontinence.   Musculoskeletal: Negative for arthralgias and myalgias.   Skin: Negative for rash and skin lesions.   Neurological: Positive for weakness. Negative for dizziness, tremors, seizures, syncope, speech difficulty and headache.   Hematological: Negative for adenopathy. Does not bruise/bleed easily.   Psychiatric/Behavioral: Positive for depressed mood and stress (family issues). Negative for dysphoric mood, sleep disturbance and suicidal ideas. The patient is  "nervous/anxious.        /82   Pulse 64   Temp 97.6 °F (36.4 °C) (Temporal)   Resp 16   Ht 165.1 cm (65\")   Wt (!) 179 kg (394 lb 6.4 oz)   LMP  (LMP Unknown)   SpO2 98%   Breastfeeding No   BMI 65.63 kg/m²  Body surface area is 2.64 meters squared.  Pain Score    21 1310   PainSc: 0-No pain         Physical Exam:  Physical Exam  Constitutional:       General: She is awake.      Appearance: Normal appearance. She is well-developed and well-groomed. She is morbidly obese.   HENT:      Head: Normocephalic and atraumatic.      Nose: Nose normal.   Eyes:      General: No scleral icterus.     Pupils: Pupils are equal, round, and reactive to light.   Neck:      Trachea: Trachea normal.   Cardiovascular:      Rate and Rhythm: Normal rate and regular rhythm.      Pulses: Normal pulses.      Heart sounds: Normal heart sounds. No murmur heard.   No friction rub. No gallop.    Pulmonary:      Effort: Pulmonary effort is normal.      Breath sounds: Normal breath sounds. No wheezing, rhonchi or rales.   Abdominal:      Palpations: Abdomen is soft.      Tenderness: There is no abdominal tenderness. There is no guarding.   Musculoskeletal:      Cervical back: Normal range of motion and neck supple.      Right lower le+ Pitting Edema present.      Left lower le+ Pitting Edema present.   Lymphadenopathy:      Cervical: No cervical adenopathy.      Upper Body:      Right upper body: No supraclavicular adenopathy.      Left upper body: No supraclavicular adenopathy.   Skin:     General: Skin is warm and dry.   Neurological:      General: No focal deficit present.      Mental Status: She is alert and oriented to person, place, and time.      Sensory: No sensory deficit.   Psychiatric:         Attention and Perception: Attention normal.         Mood and Affect: Mood and affect normal.         Speech: Speech normal.         Behavior: Behavior normal. Behavior is cooperative.         Thought Content: Thought " content normal.         Cognition and Memory: Cognition normal.         Judgment: Judgment normal.         Maria C Shrestha reports a pain score of 0.  Given her pain assessment as noted, treatment options were discussed and the following options were decided upon as a follow-up plan to address the patient's pain: referral to Primary Care for assistance in pain treatment guidance.      Patient's Body mass index is 65.63 kg/m². BMI is above normal parameters. Recommendations include: referral to primary care.      LABS    Lab Results - Last 18 Months   Lab Units 03/17/21  1301 03/17/21  0904 02/17/21  1253 01/18/21  1321 12/21/20  1400 11/11/20  1302 09/16/20  1244   HEMOGLOBIN g/dL 9.8* 9.9* 10.3* 11.1* 10.5* 10.4* 10.7*   HEMATOCRIT % 29.8* 30.6* 32.3* 34.2 31.9* 32.2* 32.9*   MCV fL 92.3 91.6 92.8 92.4 93.0 93.1 94.0   WBC 10*3/mm3 9.36 8.60 9.87 9.96 10.46 8.81 9.13   RDW % 15.3 14.8 15.2 15.2 15.4 15.5* 16.0*   MPV fL 10.2 8.9 9.8 10.4 10.5 10.2 10.2   PLATELETS 10*3/mm3 186 220 212 217 193 197 197   IMM GRAN % % 0.4  --  0.6* 0.8* 0.6* 0.5 0.4   NEUTROS ABS 10*3/mm3 6.86 6.30 7.04* 7.28* 7.75* 6.57 6.36   LYMPHS ABS 10*3/mm3 1.72 1.90 1.92 1.84 1.88 1.63 1.99   MONOS ABS 10*3/mm3 0.44  --  0.54 0.47 0.49 0.37 0.47   EOS ABS 10*3/mm3 0.26  --  0.27 0.24 0.25 0.17 0.23   BASOS ABS 10*3/mm3 0.04  --  0.04 0.05 0.03 0.03 0.04   IMMATURE GRANS (ABS) 10*3/mm3 0.04  --  0.06* 0.08* 0.06* 0.04 0.04   NRBC /100 WBC 0.0  --  0.0 0.0 0.0 0.0 0.0       Lab Results - Last 18 Months   Lab Units 03/17/21  1301 03/17/21  0904 02/17/21  1253 01/18/21  1321 12/21/20  1400 11/11/20  1302   GLUCOSE mg/dL 207* 145* 135* 280* 208* 175*   SODIUM mmol/L 141 138 141 139 141 141   POTASSIUM mmol/L 4.0 3.8 4.0 4.4 4.1 4.6   CO2 mmol/L 22.0 22.0* 26.0 23.0 22.0 23.0   CHLORIDE mmol/L 107 108 108* 106 109* 108*   ANION GAP mmol/L 12.0 8.0 7.0 10.0 10.0 10.0   CREATININE mg/dL 2.75* 2.74* 2.55* 2.84* 2.46* 2.68*   BUN mg/dL 34* 33* 32* 48* 50*  46*   BUN / CREAT RATIO  12.4 12.0 12.5 16.9 20.3 17.2   CALCIUM mg/dL 8.5* 8.5 8.9 9.5 9.2 9.0   EGFR IF NONAFRICN AM mL/min/1.73 18* 18* 19* 17* 20* 18*   ALK PHOS U/L 89 89 89 102 82 77   TOTAL PROTEIN g/dL 6.4 6.7 6.9 7.5 7.0 6.6   ALT (SGPT) U/L 9 10 9 6 7 7   AST (SGOT) U/L 14 18 10 10 9 11   BILIRUBIN mg/dL <0.2 0.2 0.2 0.2 0.2 0.2   ALBUMIN g/dL 3.10* 3.30* 3.30* 3.60 3.80 3.70   GLOBULIN gm/dL 3.3 3.4 3.6 3.9 3.2 2.9       No results for input(s): MSPIKE, KAPPALAMB, IGLFLC, URICACID, FREEKAPPAL, CEA, LDH, REFLABREPO in the last 70507 hours.    Lab Results - Last 18 Months   Lab Units 03/17/21  1301 02/17/21  1253 10/14/20  1023 08/19/20  1255 07/15/20  1255 07/15/20  1006 04/22/20  0816 04/13/20  1038 01/08/20  0923 12/18/19  1137   IRON mcg/dL 42 66 90 83 56  --  72  --   --   --    TIBC mcg/dL 261* 297* 273* 256* 308  --  288*  --   --   --    IRON SATURATION % 16* 22 33 32 18*  --  25  --   --   --    FERRITIN ng/mL 322.40* 466.10* 405.70* 639.60* 135.70  --  135.10  --   --   --    TSH uIU/mL  --   --  2.650  --   --  3.010  --  7.250* 3.590 3.640     Assessment    1.  Type 2 diabetes-patient currently taking dulaglutide, evolocumab, Humalog, and Lantus. Blood sugar on today's labs 207.  A1C today 7.5.  Managed by endocrinologist Dr. Patel.    2.  Hypertension-patient currently taking amlodipine, carvedilol, and lisinopril.  Blood pressure in office today 152/82 . Managed by primary care physician.    3.  Anxiety-patient currently taking citalopram, diazepam, lamotrigine.  Managed by primary care physician.    4.  Hypothyroidism-patient currently taking levothyroxine.  Managed by endocrinologist Dr. Patel.              .  5.  Hyperlipidemia-patient currently taking rosuvastatin.  Patient's triglycerides 355 today.  Managed by primary care physician.     6.  Chronic kidney disease stage IV-GFR on today's labs 18. Managed by nephrologist Dr. Davis    7.  Anemia due to chronic kidney disease  stage IV-today's labs shared with patient in office.    See labs above.  Patient not eligible for Procrit today as her iron saturation is 16.  Patient to receive 1 dose of Injectafer 750 mg.    Plan    1.  Continue to monitor labs monthly.    2.  Iron replacement as needed.  Patient to receive 1 dose of Injectafer 750 mg today.    3.  Procrit to be given if meets guidelines.  Patient not eligible to receive Procrit today.    4.  Patient to continue following up with primary care physician and specialists.    5.  The patient is asked to return in 1 month with preoffice labs CBC and CMP.    I spent 27 minutes caring for Maria C on this date of service. This time includes time spent by me in the following activities: preparing for the visit, reviewing tests, performing a medically appropriate examination and/or evaluation, counseling and educating the patient/family/caregiver and documenting information in the medical record.     Sindy Abad, FLOR  03/17/2021  14:39 CDT

## 2021-03-24 ENCOUNTER — TELEMEDICINE (OUTPATIENT)
Dept: ENDOCRINOLOGY | Facility: CLINIC | Age: 61
End: 2021-03-24

## 2021-03-24 DIAGNOSIS — E53.8 B12 DEFICIENCY: ICD-10-CM

## 2021-03-24 DIAGNOSIS — E11.59 HYPERTENSION ASSOCIATED WITH DIABETES (HCC): ICD-10-CM

## 2021-03-24 DIAGNOSIS — E11.69 MIXED DIABETIC HYPERLIPIDEMIA ASSOCIATED WITH TYPE 2 DIABETES MELLITUS (HCC): ICD-10-CM

## 2021-03-24 DIAGNOSIS — N18.4 TYPE 2 DIABETES MELLITUS WITH STAGE 4 CHRONIC KIDNEY DISEASE, WITH LONG-TERM CURRENT USE OF INSULIN (HCC): Primary | ICD-10-CM

## 2021-03-24 DIAGNOSIS — N25.0 RENAL OSTEODYSTROPHY: ICD-10-CM

## 2021-03-24 DIAGNOSIS — E55.9 VITAMIN D DEFICIENCY: ICD-10-CM

## 2021-03-24 DIAGNOSIS — E11.22 TYPE 2 DIABETES MELLITUS WITH STAGE 4 CHRONIC KIDNEY DISEASE, WITH LONG-TERM CURRENT USE OF INSULIN (HCC): Primary | ICD-10-CM

## 2021-03-24 DIAGNOSIS — Z79.4 TYPE 2 DIABETES MELLITUS WITH STAGE 4 CHRONIC KIDNEY DISEASE, WITH LONG-TERM CURRENT USE OF INSULIN (HCC): Primary | ICD-10-CM

## 2021-03-24 DIAGNOSIS — I15.2 HYPERTENSION ASSOCIATED WITH DIABETES (HCC): ICD-10-CM

## 2021-03-24 DIAGNOSIS — E78.2 MIXED DIABETIC HYPERLIPIDEMIA ASSOCIATED WITH TYPE 2 DIABETES MELLITUS (HCC): ICD-10-CM

## 2021-03-24 PROCEDURE — 99214 OFFICE O/P EST MOD 30 MIN: CPT | Performed by: INTERNAL MEDICINE

## 2021-03-24 RX ORDER — DULAGLUTIDE 3 MG/.5ML
3 INJECTION, SOLUTION SUBCUTANEOUS WEEKLY
Qty: 12 PEN | Refills: 3 | Status: SHIPPED | OUTPATIENT
Start: 2021-03-24 | End: 2022-06-08

## 2021-03-24 NOTE — PROGRESS NOTES
Maria C Shrestha is a 60 y.o. female who presents for  evaluation of   Type 2 diabetes                                       This was a Telehealth Encounter. Benefits and Disadvantages of a Telehealth Visit were discussed and accepted by patient. .  Patient agreed to receive service through Telehealth visit as patient is being compliant with social distancing recommendations imparted by CDC.     You have chosen to receive care through a telehealth visit.  Do you consent to use a video/audio connection for your medical care today? Yes        Primary Care / Referring Provider  Ole Soliman MD    History of Present Illness  Duration/Timing:  Diabetes mellitus type 2          Severity (Complications/Hospitalizations)  Secondary Microvascular Complications:  Diabetic Nephropathy, No Diabetic Neuropathy    Macrovascular  , Carotid Artery Disease      Context  Diabetes Regimen:  Insulin, Compliant with regimen  Blood Glucose Readings    Running elevated, admitted to stress eating     Diet    counts carbs, eating only 45 g cho per meal     Exercise:  Does not exercise    Associated Signs/Symptoms  Hyperglycemic Symptoms:  No polyuria, No polydipsia, No polyphagia, Weight loss with keto diet before but not now   Hypoglycemic Episodes:  No documented hypoglycemia       Review of Systems    Review of Systems   Psychiatric/Behavioral: Positive for sleep disturbance. The patient is nervous/anxious.       daughter undergoing divorce   Objective:       Physical Exam   Constitutional: No distress.   HENT:   Head: Normocephalic.   Pulmonary/Chest: Effort normal.   Musculoskeletal:         General: No edema.   Skin: She is not diaphoretic.         Lab Review            Assessment/Plan       ICD-10-CM ICD-9-CM   1. Type 2 diabetes mellitus with stage 4 chronic kidney disease, with long-term current use of insulin (CMS/[facility])  E11.22 250.40    N18.4 585.4    Z79.4 V58.67   2. Hypertension associated with diabetes (CMS/[facility])  E11.59  250.80    I15.2 401.9   3. Mixed diabetic hyperlipidemia associated with type 2 diabetes mellitus (CMS/[facility])  E11.69 250.80    E78.2 272.2   4. Vitamin D deficiency  E55.9 268.9   5. B12 deficiency  E53.8 266.2   6. Renal osteodystrophy  N25.0 588.0       Glycemic Management:   Lab Results   Component Value Date    HGBA1C 7.5 (H) 03/17/2021    HGBA1C 6.6 (H) 10/14/2020    HGBA1C 7.0 (H) 07/15/2020     Lab Results   Component Value Date    GLUCOSE 207 (H) 03/17/2021    BUN 34 (H) 03/17/2021    CREATININE 2.75 (H) 03/17/2021    EGFRIFNONA 18 (L) 03/17/2021    BCR 12.4 03/17/2021    CO2 22.0 03/17/2021    CALCIUM 8.5 (L) 03/17/2021    ALBUMIN 3.10 (L) 03/17/2021    AST 14 03/17/2021    ALT 9 03/17/2021     Lab Results   Component Value Date    WBC 9.36 03/17/2021    HGB 9.8 (L) 03/17/2021    HCT 29.8 (L) 03/17/2021    MCV 92.3 03/17/2021     03/17/2021     Lab Results   Component Value Date    CREATININE 2.75 (H) 03/17/2021    CREATININE 2.74 (H) 03/17/2021    CREATININE 2.55 (H) 02/17/2021    CREATININE 2.84 (H) 01/18/2021    CREATININE 2.46 (H) 12/21/2020        Using deidra   Read over the phone   28% above  72% in target         Trulicity 1.5 mg weekly -- increase to 3 mg weekly     Humulin U 500 before     Lantus 160 at night change to 145 -- 150 --- now decrease to 100 -- increase to 120 -80    Humalog 15 units ( 1:3 ratio  )     invokana , I wanted to add but GFR less than 30              Lipid Management    Lab Results   Component Value Date    TRIG 355 (H) 03/17/2021    TRIG 197 (H) 10/14/2020    TRIG 339 (H) 07/15/2020     Lab Results   Component Value Date    HDL 41 (L) 03/17/2021    HDL 42 (L) 10/14/2020    HDL 36 (L) 07/15/2020     No components found for: LDLCALC  Lab Results   Component Value Date    LDL 81 03/17/2021    LDL 58 10/14/2020    LDL 17 07/15/2020     Lab Results   Component Value Date    LDL 81 03/17/2021    LDL 58 10/14/2020    LDL 17 07/15/2020         on Crestor 20 mg daily   Generic  causing myalgias, higher doses not tolerated    Need to add repatha since LDL goal is less than 70 due to carotid artery disease and already on maximal tolerated statin and intolerant to zetia     Now on repatha and working     Blood Pressure Management:    There were no vitals filed for this visit.      Per nephrology , stable function       Microvascular Complication Monitoring:  No Microalbuminuria, No Diabetic Retinopathy, Date of last eye exam 07/18/2019, No Diabetic Neuropathy  on ACE i and coreg 12.5 po bid     ckd stage IV    followed by nephrology     I suggest that she doesn't consume more than 140 g protein per day   Plant better than animal but restricted on keto     --      Lab Results   Component Value Date    CREATININE 2.75 (H) 03/17/2021    BUN 34 (H) 03/17/2021     03/17/2021    K 4.0 03/17/2021     03/17/2021    CO2 22.0 03/17/2021         Lab Results   Component Value Date    CREATININE 2.75 (H) 03/17/2021    CREATININE 2.74 (H) 03/17/2021    CREATININE 2.55 (H) 02/17/2021         Immunizations:  Last pneumococcal immunization pneumovax before age 65     Flu Shot will have in Mid Nov 2016       Preventive Care:  No smoking      Weight Related:   Wt Readings from Last 3 Encounters:   03/17/21 (!) 180 kg (396 lb)   03/17/21 (!) 179 kg (394 lb 6.4 oz)   02/24/21 (!) 172 kg (380 lb)     There is no height or weight on file to calculate BMI.      prefers no bariatric surgery    Now on cpap       Bone Health    Lab Results   Component Value Date    .0 (H) 03/17/2021    CALCIUM 8.5 (L) 03/17/2021     For JARETH     Was having hypercalcemia with rocatrol 0.25 three times weekly and on calcium    Now on sensipar 30 mg daily but without calcium -- not on sensipar anymore     Start calcium low dose 400 mg daily     DXA No 2018, osteopenia left femoral neck      Monitor PTH , no more than 150 , no less than 60       Thyroid Health  Lab Results   Component Value Date    TSH 4.940 (H)  03/17/2021     On levothyroxine 50 ug daily - increase to 75 mcgs daily - increase to 88     Other Diabetes Related Aspects       Lab Results   Component Value Date    XCJUYPED48 551 03/17/2021     Lab Results   Component Value Date    WBC 9.36 03/17/2021    HGB 9.8 (L) 03/17/2021    HCT 29.8 (L) 03/17/2021    MCV 92.3 03/17/2021     03/17/2021     Lab Results   Component Value Date    IRON 42 03/17/2021    TIBC 261 (L) 03/17/2021    FERRITIN 322.40 (H) 03/17/2021       Anemia , managed by nephrology   Deciding vs epo vs iron   But now   DUB, may need hysterectomy       Systolic Murmur, AS? Echo   Could be due to anemia     Echo and carotid US from 7-17 , normal echo and less than 50% blockage in carotids, repeat     I reviewed and summarized records from Ole Soliman MD from present year  and I reviewed / ordered labs.     No orders of the defined types were placed in this encounter.        A copy of my note was sent to Ole Soliman MD    Please see my above opinion and suggestions.       I spent 15 minutes reviewing patient electronic chart , reviewing medications , past history , active problems.   I provided advice regarding management of medical conditions, refilled prescriptions , ordered labs and arranged for future appointment.   Patient was advised to contact us if there were any unanswered questions or ongoing concerns.

## 2021-03-25 DIAGNOSIS — F41.8 MIXED ANXIETY AND DEPRESSIVE DISORDER: Primary | ICD-10-CM

## 2021-03-25 RX ORDER — BUSPIRONE HYDROCHLORIDE 10 MG/1
TABLET ORAL
Qty: 360 TABLET | Refills: 3 | Status: SHIPPED | OUTPATIENT
Start: 2021-03-25 | End: 2022-02-07 | Stop reason: SDUPTHER

## 2021-03-25 RX ORDER — LAMOTRIGINE 100 MG/1
TABLET ORAL
Qty: 45 TABLET | Refills: 3 | Status: SHIPPED | OUTPATIENT
Start: 2021-03-25 | End: 2022-03-22 | Stop reason: SDUPTHER

## 2021-03-25 RX ORDER — CITALOPRAM 40 MG/1
TABLET ORAL
Qty: 90 TABLET | Refills: 3 | Status: SHIPPED | OUTPATIENT
Start: 2021-03-25 | End: 2022-02-09 | Stop reason: SDUPTHER

## 2021-03-30 ENCOUNTER — TRANSCRIBE ORDERS (OUTPATIENT)
Dept: LAB | Facility: HOSPITAL | Age: 61
End: 2021-03-30

## 2021-03-30 DIAGNOSIS — Z01.818 PRE-OP TESTING: Primary | ICD-10-CM

## 2021-04-02 ENCOUNTER — LAB (OUTPATIENT)
Dept: LAB | Facility: HOSPITAL | Age: 61
End: 2021-04-02

## 2021-04-02 LAB — SARS-COV-2 ORF1AB RESP QL NAA+PROBE: NOT DETECTED

## 2021-04-02 PROCEDURE — C9803 HOPD COVID-19 SPEC COLLECT: HCPCS | Performed by: OTOLARYNGOLOGY

## 2021-04-02 PROCEDURE — U0005 INFEC AGEN DETEC AMPLI PROBE: HCPCS | Performed by: OTOLARYNGOLOGY

## 2021-04-02 PROCEDURE — U0004 COV-19 TEST NON-CDC HGH THRU: HCPCS | Performed by: OTOLARYNGOLOGY

## 2021-04-05 NOTE — PROGRESS NOTES
YOB: 1960  Location: [facility]  Location Address: [address] [place] [address], [facility], [address] [place] [address]  Location Phone: [phone]    Chief Complaint   Patient presents with   • Sinus Problem     pt having sinus drainage and stating that when this happens her uvula gets swollen and she has a hard time breathing    • Sleep Apnea       History of Present Illness  Maria C Shrestha is a 61 y.o. female.  Maria C Shrestha is here for evaluation of ENT complaints. The patient has had problems with nasal drainage, nasal congestion, postnasal drip and uvula swelling.  The symptoms are localized to the bilateral nose and uvula. The patient has had moderate and variable symptoms. The symptoms have been present for the last several years. The symptoms are aggravated by  allergy and weather change. The symptoms are improved by antihistamines and nasal sprays.       Past Medical History:   Diagnosis Date   • Acquired hypothyroidism 2017   • Allergic    • Anemia    • Anemia due to stage 4 chronic kidney disease (CMS/HCC) 2020   • Anxiety    • Arthritis    • Cellulitis    • Chronic kidney disease     3rd stage   • Depression    • Disease of thyroid gland    • Dyslipidemia    • Elevated cholesterol    • Essential hypertension    • Fatigue    • Gallbladder abscess    • Hearing loss    • Light headed    • Limited range of motion (ROM) of shoulder     right   • Obesity    • Palpitation    • Short of breath on exertion    • Sinusitis    • Sleep apnea    • Stage 4 chronic kidney disease (CMS/HCC) 2020   • Type 2 diabetes mellitus (CMS/HCC)    • Type II diabetes mellitus, uncontrolled (CMS/HCC)    • Wears glasses        Past Surgical History:   Procedure Laterality Date   • ADENOIDECTOMY     • ANAL FISTULA REPAIR      x 2    • CHOLECYSTECTOMY     • COLONOSCOPY  2014   • COLONOSCOPY N/A 3/22/2017    Procedure: COLONOSCOPY WITH ANESTHESIA;  Surgeon: Mono Linder MD;  Location: [facility] ENDOSCOPY;  Service:  "   • D & C HYSTEROSCOPY N/A 7/25/2018    Procedure: DILATATION AND CURETTAGE HYSTEROSCOPY;  Surgeon: Michael Castaneda MD;  Location: [facility];  Service: Obstetrics/Gynecology   • DILATATION AND CURETTAGE      X 2   • ENDOSCOPY     • HYSTERECTOMY     • TONSILLECTOMY         Outpatient Medications Marked as Taking for the 4/6/21 encounter (Office Visit) with Garrett Denton MD   Medication Sig Dispense Refill   • allopurinol (ZYLOPRIM) 100 MG tablet Take 100 mg by mouth 2 (Two) Times a Day.     • amLODIPine (NORVASC) 10 MG tablet Take 10 mg by mouth.     • aspirin 81 MG tablet Take 81 mg by mouth Daily. Stop 7/22/2018     • BD Insulin Syringe U/F 31G X 5/16\" 1 ML misc AS DIRECTED FOUR TIMES A  each 0   • busPIRone (BUSPAR) 10 MG tablet TAKE 2 TABLETS TWICE A DAY AS NEEDED 360 tablet 3   • carvedilol (COREG) 12.5 MG tablet      • cetirizine (zyrTEC) 10 MG tablet Take 10 mg by mouth As Needed.     • Cholecalciferol (VITAMIN D3) 80182 units tablet Take 1 capsule by mouth 2 (Two) Times a Week.     • citalopram (CeleXA) 40 MG tablet TAKE 1 TABLET DAILY 90 tablet 3   • diazePAM (VALIUM) 2 MG tablet Take 2 mg by mouth As Needed for Anxiety.     • Dulaglutide (Trulicity) 3 MG/0.5ML solution pen-injector Inject 3 mg under the skin into the appropriate area as directed 1 (One) Time Per Week. 12 pen 3   • Evolocumab 140 MG/ML solution auto-injector Inject 1 mL under the skin into the appropriate area as directed Every 14 (Fourteen) Days for 24 doses. NDC [address]-06 6 mL 10   • fluticasone (FLONASE) 50 MCG/ACT nasal spray      • fluticasone (VERAMYST) 27.5 MCG/SPRAY nasal spray 2 sprays into each nostril Daily.     • Glucose Blood (BLOOD GLUCOSE TEST) strip Use 4 x daily, use any brand covered by insurance or same brand as before 360 each 3   • insulin glargine (Lantus) 100 UNIT/ML injection INJECT 200 UNITS DAILY (Patient taking differently: Inject 90 Units under the skin into the appropriate area as directed " Daily. INJECT 200 UNITS DAILY) 60 mL 11   • Insulin Lispro (HumaLOG KwikPen) 200 UNIT/ML solution pen-injector Inject 80 Units under the skin into the appropriate area as directed 3 (Three) Times a Day With Meals. 24 pen 11   • Insulin Pen Needle (B-D ULTRAFINE III SHORT PEN) 31G X 8 MM misc USE AS DIRECTED TO INJECT FOUR TIMES DAILY 200 each 2   • lamoTRIgine (LaMICtal) 100 MG tablet TAKE ONE-HALF (1/2) TABLET DAILY 45 tablet 3   • Lancets (freestyle) lancets Use as instructed 4 x daily 150 each 11   • levothyroxine (Synthroid) 88 MCG tablet Take 1 tablet by mouth Daily. 30 tablet 11   • lisinopril (PRINIVIL,ZESTRIL) 40 MG tablet Take 40 mg by mouth 2 (Two) Times a Day.     • Multiple Vitamins-Minerals (MULTIVITAMIN ADULT PO) Take 1 tablet by mouth Daily.     • Omega-3 Fatty Acids (FISH OIL) 1000 MG capsule capsule Take 2,000 mg by mouth Daily With Dinner.     • potassium gluconate 595 (99 K) MG tablet tablet potassium gluconate 595 mg (99 mg) tablet   Take 1 tablets every day by oral route.     • rosuvastatin (CRESTOR) 20 MG tablet      • sodium bicarbonate 650 MG tablet Take 1,300 mg by mouth 3 (Three) Times a Day.         Codeine    Family History   Problem Relation Age of Onset   • Diabetes Other    • Heart failure Other    • Cancer Other    • Kidney disease Other    • Cancer Mother    • Cancer Father    • Heart disease Father    • Diabetes Father    • Obesity Father    • Stroke Father    • Cancer Maternal Grandmother    • Uterine cancer Maternal Grandmother    • Diabetes Maternal Grandfather    • Cancer Paternal Grandmother    • Colon cancer Paternal Grandmother    • Diabetes Paternal Grandfather    • Heart disease Paternal Grandfather    • No Known Problems Sister    • No Known Problems Brother    • No Known Problems Daughter    • No Known Problems Maternal Aunt    • No Known Problems Paternal Aunt    • BRCA 1/2 Neg Hx    • Breast cancer Neg Hx    • Endometrial cancer Neg Hx    • Ovarian cancer Neg Hx         Social History     Socioeconomic History   • Marital status:      Spouse name: Not on file   • Number of children: Not on file   • Years of education: Not on file   • Highest education level: Not on file   Tobacco Use   • Smoking status: Never Smoker   • Smokeless tobacco: Never Used   Vaping Use   • Vaping Use: Never used   Substance and Sexual Activity   • Alcohol use: No   • Drug use: No   • Sexual activity: Never     Birth control/protection: Post-menopausal       Review of Systems   Constitutional: Negative for activity change, appetite change, chills, diaphoresis, fatigue, fever and unexpected weight change.   HENT: Positive for congestion and postnasal drip. Negative for dental problem, drooling, ear discharge, ear pain, facial swelling, hearing loss, mouth sores, nosebleeds, rhinorrhea, sinus pressure, sneezing, sore throat, tinnitus, trouble swallowing and voice change.         Uvula swelling   Eyes: Negative.    Respiratory: Negative.    Cardiovascular: Negative.    Gastrointestinal: Negative.    Endocrine: Negative.    Skin: Negative.    Allergic/Immunologic: Positive for environmental allergies. Negative for food allergies and immunocompromised state.   Neurological: Negative.    Hematological: Negative.    Psychiatric/Behavioral: Negative.        Vitals:    04/06/21 1058   BP: 172/76   Pulse: 64   Temp: 97.8 °F (36.6 °C)       Body mass index is 65.8 kg/m².    Objective     Physical Exam  Physical Exam  CONSTITUTIONAL: well nourished, alert, oriented, in no acute distress      COMMUNICATION AND VOICE: able to communicate normally, normal voice quality     HEAD: normocephalic, no lesions, atraumatic, no tenderness, no masses      FACE: appearance normal, no lesions, no tenderness, no deformities, facial motion symmetric     EYES: ocular motility normal, eyelids normal, orbits normal, no proptosis, conjunctiva normal , pupils equal, round      EARS:  Hearing: response to conversational voice  normal bilaterally   External Ears: auricles without lesions  Otoscopic: tympanic membrane appearance normal, no lesions, no perforation, normal mobility, no fluid     NOSE:  External Nose: structure normal, no tenderness on palpation, no nasal discharge, no lesions, no evidence of trauma, nostrils patent   Intranasal Exam: nasal mucosa erythema and edema, vestibule within normal limits, inferior turbinate normal, nasal septum midline      ORAL:  Lips: upper and lower lips without lesion   Teeth: dentition within normal limits for age   Gums: gingivae healthy   Oral Mucosa: oral mucosa normal, no mucosal lesions   Floor of Mouth: Warthin’s duct patent, mucosa normal  Tongue: lingual mucosa normal without lesions, normal tongue mobility   Palate: soft and hard palates with normal mucosa and structure  Oropharynx: oropharyngeal mucosa with mild erythema and postnasal drainage     NECK: neck appearance normal    CHEST/RESPIRATORY: respiratory effort normal     CARDIOVASCULAR: extremities without cyanosis or edema       NEUROLOGIC/PSYCHIATRIC: oriented to time, place and person, mood normal, affect appropriate, CN II-XII intact grossly    Assessment/Plan   Diagnoses and all orders for this visit:    1. Allergic rhinitis with postnasal drip (Primary)  -     azelastine (ASTELIN) 0.1 % nasal spray; 2 sprays into the nostril(s) as directed by provider 2 (Two) Times a Day. Use in each nostril as directed  Dispense: 30 mL; Refill: 11  -     mupirocin (BACTROBAN) 2 % ointment; Apply  topically to the appropriate area as directed 3 (Three) Times a Day.  Dispense: 22 g; Refill: 0  -     guaiFENesin (MUCINEX) 600 MG 12 hr tablet; Take 1 tablet by mouth 2 (Two) Times a Day for 30 days. 1 by mouth twice a day  Dispense: 60 tablet; Refill: 3      * Surgery not found *  No orders of the defined types were placed in this encounter.    Return in about 6 months (around 10/6/2021) for Recheck sinuses.       Patient Instructions   Advised  to continue Flonase and will start Astelin, Bactroban, and mucinex. Advised to only take zyrtec as needed and to eliminate dairy from the diet. Follow-up in 6 months.

## 2021-04-06 ENCOUNTER — OFFICE VISIT (OUTPATIENT)
Dept: OTOLARYNGOLOGY | Facility: CLINIC | Age: 61
End: 2021-04-06

## 2021-04-06 VITALS
SYSTOLIC BLOOD PRESSURE: 172 MMHG | DIASTOLIC BLOOD PRESSURE: 76 MMHG | HEART RATE: 64 BPM | HEIGHT: 65 IN | BODY MASS INDEX: 48.82 KG/M2 | TEMPERATURE: 97.8 F | WEIGHT: 293 LBS

## 2021-04-06 DIAGNOSIS — J30.9 ALLERGIC RHINITIS WITH POSTNASAL DRIP: Primary | ICD-10-CM

## 2021-04-06 DIAGNOSIS — R09.82 ALLERGIC RHINITIS WITH POSTNASAL DRIP: Primary | ICD-10-CM

## 2021-04-06 PROCEDURE — 99214 OFFICE O/P EST MOD 30 MIN: CPT | Performed by: PHYSICIAN ASSISTANT

## 2021-04-06 RX ORDER — FLUTICASONE PROPIONATE 50 MCG
1 SPRAY, SUSPENSION (ML) NASAL 2 TIMES DAILY
COMMUNITY
Start: 2021-03-22

## 2021-04-06 RX ORDER — GUAIFENESIN 600 MG/1
600 TABLET, EXTENDED RELEASE ORAL 2 TIMES DAILY
Qty: 60 TABLET | Refills: 3 | Status: SHIPPED | OUTPATIENT
Start: 2021-04-06 | End: 2021-05-06

## 2021-04-06 RX ORDER — AZELASTINE 1 MG/ML
2 SPRAY, METERED NASAL 2 TIMES DAILY
Qty: 30 ML | Refills: 11 | Status: SHIPPED | OUTPATIENT
Start: 2021-04-06 | End: 2021-08-19

## 2021-04-06 RX ORDER — CARVEDILOL 12.5 MG/1
12.5 TABLET ORAL 2 TIMES DAILY WITH MEALS
COMMUNITY
Start: 2021-03-16 | End: 2022-02-09

## 2021-04-06 RX ORDER — ROSUVASTATIN CALCIUM 20 MG/1
TABLET, COATED ORAL
COMMUNITY
Start: 2021-03-24 | End: 2022-02-07 | Stop reason: SDUPTHER

## 2021-04-06 NOTE — PATIENT INSTRUCTIONS
Advised to continue Flonase and will start Astelin, Bactroban, and mucinex. Advised to only take zyrtec as needed and to eliminate dairy from the diet. Follow-up in 6 months.

## 2021-04-08 NOTE — PROGRESS NOTES
[facility]  HEMATOLOGY & ONCOLOGY    W ONC [facility] HEMATOLOGY AND ONCOLOGY  [address]  [facility] [address] [phone]    Patient Name: Maria C Shrestha  Encounter Date: 04/14/2021  YOB: 1960  Patient Number: [id number]    Chief Complaint   Patient presents with   • Anemia     Here for f/u       REASON FOR VISIT: Maria C Shrestha is a 60-year-old female here for follow-up of anemia due to chronic kidney disease stage IV.  She sees nephrologist Dr. Davis.  She has been receiving Procrit when she meets guidelines.  Her last Procrit injection was on 12/21/2020.  She has also had iron infusions in the past with the last infusions of Injectafer being on 7/17/2020 and 7/31/2020.    At last appointment patient had stated that Dr. Davis told her in December that he was going to get her ready for dialysis.  Today she states she has an appointment with Dr. Davis on April 7, and at that time they will discuss possible dialysis.    Patient states she is still having the same symptoms as she was experiencing at her last office visit which is extreme fatigue, generalized weakness, sinus congestion and allergies.  No sinus pressure upon physical examination.  Patient states she is taking Zyrtec and using Flonase twice daily.  She states her throat feels swollen in the back especially in the morning after using CPAP all night.  She states that the reservoir is dry by morning.  Patient has not seen her primary care physician Dr. Soliman regarding this issue.  Encouraged patient to make appointment to be seen by Dr. Soliman.  At last appointment diabetes management was discussed with patient.  She states she has been trying to make small changes in her diet since her last visit.  Still dealing with depression and problems managing her diabetes.  Patient has lost 6 pounds since last office visit which she states that she believes is  "fluid loss.  She does have lower extremity edema that is nonpitting.  She is still using a wheelchair to move around as she is not able to walk more than a few feet.  She denies abdominal pain, fever, chills, night sweats, diarrhea, constipation, nausea/vomiting.    Labs today include hemoglobin 10.3, hematocrit 32.3, GFR 19, iron saturation 22, ferritin 466.10, and platelets 212,000.  Patient meets guidelines to receive Procrit today.    On follow up 3/17/2021: Patient last received Procrit on 2/17/2021. Patient is seeing ENT next month for throat issues. States she has gained 10 pounds since last visit, and is holding a lot fluid.  Lung sounds are clear, normal heart sounds heard, and she does have 1+ pitting edema in her lower extremities bilaterally.  She is scheduled to see nephrologist Dr. Davis next week.  She states she still has fatigue specially upon walking short distances, and still has shortness of breath with exertion.  Denies chills, fever, nausea/vomiting.    Today's labs shared with patient in office.  Hemoglobin 9.8, hematocrit 29.8, platelets 186,000, blood glucose 207, iron saturation 16, ferritin 322.40.  As patient's iron saturation is less than 20 patient is not eligible for Procrit today.  Patient is to receive 1 dose of Injectafer 750 mg today.  Patient has had Injectafer in the past as she was unable to tolerate oral iron pills.  Will reassess labs in 1 month with possible Procrit injection.      On follow up 4/14/2021: Patient presents feeling \"okay, but tired\". She complains of bilateral edema in lower extremities and fatigue. She denies fever, chills, nausea, vomiting, weight loss.   She states she has been fluctuating 10 pounds due to fluid build up. Left leg swells larger than right.  Edema decreases after elevating legs.  Recently met with nephrologist Dr. Davis, and he states they are still working towards dialysis. Discussed with patient to decrease salt intake including " "canned foods.  Patient encouraged to continue drinking water and continue decreasing soda intake.  Continue elevating legs.  Encouraged patient to discuss with her pharmacy her options of possibly special ordering compression stockings to help with her lower extremity edema.  Pitting edema present bilaterally in lower extremities.    Her labs today do meet the guidelines to receive Procrit.  Labs shared with patient in office.  Her hemoglobin has increased to 10, hematocrit 30.2, GFR decreased to 15, iron saturation 21, ferritin high at 538.70 (patient had Injectafer infusion 1 month ago).           2/17/21   Procrit 40,000 units   04/14/2021 Procrit 40,000 units                 3/17/21 Injectafer 750mg                     PAST MEDICAL HISTORY:  ALLERGIES:  Allergies   Allergen Reactions   • Codeine Nausea Only       CURRENT MEDICATIONS:  Outpatient Encounter Medications as of 4/14/2021   Medication Sig Dispense Refill   • allopurinol (ZYLOPRIM) 100 MG tablet Take 100 mg by mouth 2 (Two) Times a Day.     • amLODIPine (NORVASC) 10 MG tablet Take 10 mg by mouth.     • aspirin 81 MG tablet Take 81 mg by mouth Daily. Stop 7/22/2018     • azelastine (ASTELIN) 0.1 % nasal spray 2 sprays into the nostril(s) as directed by provider 2 (Two) Times a Day. Use in each nostril as directed 30 mL 11   • BD Insulin Syringe U/F 31G X 5/16\" 1 ML misc AS DIRECTED FOUR TIMES A  each 0   • busPIRone (BUSPAR) 10 MG tablet TAKE 2 TABLETS TWICE A DAY AS NEEDED 360 tablet 3   • carvedilol (COREG) 12.5 MG tablet      • cetirizine (zyrTEC) 10 MG tablet Take 10 mg by mouth As Needed.     • Cholecalciferol (VITAMIN D3) 79635 units tablet Take 1 capsule by mouth 2 (Two) Times a Week.     • citalopram (CeleXA) 40 MG tablet TAKE 1 TABLET DAILY 90 tablet 3   • diazePAM (VALIUM) 2 MG tablet Take 2 mg by mouth As Needed for Anxiety.     • Dulaglutide (Trulicity) 3 MG/0.5ML solution pen-injector Inject 3 mg under the skin into the appropriate " area as directed 1 (One) Time Per Week. 12 pen 3   • Evolocumab 140 MG/ML solution auto-injector Inject 1 mL under the skin into the appropriate area as directed Every 14 (Fourteen) Days for 24 doses. NDC [address]-88 6 mL 10   • fluticasone (FLONASE) 50 MCG/ACT nasal spray      • Glucose Blood (BLOOD GLUCOSE TEST) strip Use 4 x daily, use any brand covered by insurance or same brand as before 360 each 3   • guaiFENesin (MUCINEX) 600 MG 12 hr tablet Take 1 tablet by mouth 2 (Two) Times a Day for 30 days. 1 by mouth twice a day 60 tablet 3   • insulin glargine (Lantus) 100 UNIT/ML injection INJECT 200 UNITS DAILY (Patient taking differently: Inject 90 Units under the skin into the appropriate area as directed Daily. INJECT 200 UNITS DAILY) 60 mL 11   • Insulin Lispro (HumaLOG KwikPen) 200 UNIT/ML solution pen-injector Inject 80 Units under the skin into the appropriate area as directed 3 (Three) Times a Day With Meals. 24 pen 11   • Insulin Pen Needle (B-D ULTRAFINE III SHORT PEN) 31G X 8 MM misc USE AS DIRECTED TO INJECT FOUR TIMES DAILY 200 each 2   • lamoTRIgine (LaMICtal) 100 MG tablet TAKE ONE-HALF (1/2) TABLET DAILY 45 tablet 3   • Lancets (freestyle) lancets Use as instructed 4 x daily 150 each 11   • levothyroxine (Synthroid) 88 MCG tablet Take 1 tablet by mouth Daily. 30 tablet 11   • lisinopril (PRINIVIL,ZESTRIL) 40 MG tablet Take 40 mg by mouth 2 (Two) Times a Day.     • Multiple Vitamins-Minerals (MULTIVITAMIN ADULT PO) Take 1 tablet by mouth Daily.     • mupirocin (BACTROBAN) 2 % ointment Apply  topically to the appropriate area as directed 3 (Three) Times a Day. 22 g 0   • Omega-3 Fatty Acids (FISH OIL) 1000 MG capsule capsule Take 2,000 mg by mouth Daily With Dinner.     • potassium gluconate 595 (99 K) MG tablet tablet potassium gluconate 595 mg (99 mg) tablet   Take 1 tablets every day by oral route.     • rosuvastatin (CRESTOR) 20 MG tablet      • sodium bicarbonate 650 MG tablet Take 1,300 mg by  mouth 3 (Three) Times a Day.     • [DISCONTINUED] fluticasone (VERAMYST) 27.5 MCG/SPRAY nasal spray 2 sprays into each nostril Daily.       No facility-administered encounter medications on file as of 4/14/2021.       ADULT ILLNESSES:  Patient Active Problem List   Diagnosis Code   • Type 2 diabetes mellitus with stage 3 chronic kidney disease, with long-term current use of insulin (CMS/[facility]) E11.22, N18.30, Z79.4   • Mixed diabetic hyperlipidemia associated with type 2 diabetes mellitus (CMS/[facility]) E11.69, E78.2   • Hypertension associated with diabetes (CMS/[facility]) E11.59, I15.2   • Vitamin D deficiency E55.9   • B12 deficiency E53.8   • Anemia in CKD (chronic kidney disease) N18.9, D63.1   • Acquired hypothyroidism E03.9   • Chronic kidney disease, stage 4 (severe) (CMS/[facility]) N18.4   • Anemia D64.9   • Anxiety F41.9   • Deep venous thrombosis of lower extremity (CMS/[facility]) I82.409   • Disease of liver K76.9   • Embolism (CMS/[facility]) I74.9   • Hypertriglyceridemia E78.1   • Hypocalcemia E83.51   • Mood disorder (CMS/[facility]) F39   • Morbid obesity (CMS/[facility]) E66.01   • Sleep apnea G47.30   • Acute renal failure superimposed on stage 3 chronic kidney disease (CMS/[facility]) N17.9, N18.30   • Anemia of chronic renal failure N18.9, D63.1   • Hypertension I10   • Diabetes mellitus (CMS/[facility]) E11.9   • Thyroid disease E07.9   • Hypothyroidism E03.9   • Vitamin D deficiency E55.9   • Anemia due to stage 4 chronic kidney disease (CMS/[facility]) N18.4, D63.1   • Stage 4 chronic kidney disease (CMS/[facility]) N18.4   • Allergic rhinitis with postnasal drip J30.9, R09.82       SURGERIES:  Past Surgical History:   Procedure Laterality Date   • ADENOIDECTOMY     • ANAL FISTULA REPAIR      x 2    • CHOLECYSTECTOMY     • COLONOSCOPY  01/02/2014   • COLONOSCOPY N/A 3/22/2017    Procedure: COLONOSCOPY WITH ANESTHESIA;  Surgeon: Mono Linder MD;  Location: [facility] ENDOSCOPY;  Service:    • D & C HYSTEROSCOPY N/A 7/25/2018    Procedure: DILATATION AND CURETTAGE  HYSTEROSCOPY;  Surgeon: Michael Castaneda MD;  Location: [facility];  Service: Obstetrics/Gynecology   • DILATATION AND CURETTAGE      X 2   • ENDOSCOPY     • HYSTERECTOMY     • TONSILLECTOMY         HEALTH MAINTENANCE ITEMS:  <no information>  Last Completed Colonoscopy       Status Date      COLONOSCOPY Done 3/22/2017 Surg:COLONOSCOPY     Patient has more history with this topic...          Last Completed Mammogram       Status Date      MAMMOGRAM Done 8/12/2020 MAMMO SCREENING DIGITAL TOMOSYNTHESIS BILATERAL W CAD     Patient has more history with this topic...          FAMILY HISTORY:  Family History   Problem Relation Age of Onset   • Diabetes Other    • Heart failure Other    • Cancer Other    • Kidney disease Other    • Cancer Mother    • Cancer Father    • Heart disease Father    • Diabetes Father    • Obesity Father    • Stroke Father    • Cancer Maternal Grandmother    • Uterine cancer Maternal Grandmother    • Diabetes Maternal Grandfather    • Cancer Paternal Grandmother    • Colon cancer Paternal Grandmother    • Diabetes Paternal Grandfather    • Heart disease Paternal Grandfather    • No Known Problems Sister    • No Known Problems Brother    • No Known Problems Daughter    • No Known Problems Maternal Aunt    • No Known Problems Paternal Aunt    • BRCA 1/2 Neg Hx    • Breast cancer Neg Hx    • Endometrial cancer Neg Hx    • Ovarian cancer Neg Hx        SOCIAL HISTORY:  Social History     Socioeconomic History   • Marital status:      Spouse name: Not on file   • Number of children: Not on file   • Years of education: Not on file   • Highest education level: Not on file   Tobacco Use   • Smoking status: Never Smoker   • Smokeless tobacco: Never Used   Vaping Use   • Vaping Use: Never used   Substance and Sexual Activity   • Alcohol use: No   • Drug use: No   • Sexual activity: Never     Birth control/protection: Post-menopausal     I have reviewed the ROS documented here and confirmed the  "accuracy of it with the patient today. 04/14/2021  FLOR Soares      REVIEW OF SYSTEMS:  Review of Systems   Constitutional: Positive for fatigue (Patient in wheelchair, and only able to walk short distances). Negative for activity change, appetite change, chills, diaphoresis, fever and unexpected weight loss.   HENT: Negative for congestion, ear pain, nosebleeds, postnasal drip, sinus pressure, sore throat (mainly in am after sleeping with cpap all night. States resevoir is dry by morning.) and voice change.    Eyes: Negative for blurred vision, double vision, pain and visual disturbance.   Respiratory: Positive for shortness of breath (Upon exersion, unable to walk very much). Negative for cough.    Cardiovascular: Positive for leg swelling. Negative for chest pain and palpitations.   Gastrointestinal: Negative for abdominal pain, anal bleeding, blood in stool, constipation, diarrhea, nausea and vomiting.   Endocrine: Negative for heat intolerance, polydipsia and polyuria.   Genitourinary: Positive for frequency, urgency (States she has 3-4 accidents per week) and urinary incontinence (3-4 times weekly). Negative for dysuria and hematuria.   Musculoskeletal: Positive for gait problem (uses wheelchair). Negative for arthralgias and myalgias.   Skin: Negative for rash and skin lesions.   Neurological: Positive for weakness. Negative for dizziness, tremors, seizures, syncope, speech difficulty and headache.   Hematological: Negative for adenopathy. Does not bruise/bleed easily.   Psychiatric/Behavioral: Positive for depressed mood and stress (family issues). Negative for dysphoric mood, sleep disturbance and suicidal ideas. The patient is nervous/anxious.        /68   Pulse 68   Temp 97.5 °F (36.4 °C) (Temporal)   Resp 16   Ht 165.1 cm (65\")   Wt (!) 175 kg (385 lb)   LMP  (LMP Unknown)   SpO2 99%   Breastfeeding No   BMI 64.07 kg/m²  Body surface area is 2.62 meters squared.  Pain Score    " 21 1347   PainSc: 0-No pain       Same physical examination performed again with no changes from last office visit. 2021  Sindy FLOR Abad      Physical Exam:  Physical Exam  Constitutional:       General: She is awake.      Appearance: Normal appearance. She is well-developed and well-groomed. She is morbidly obese.   HENT:      Head: Normocephalic and atraumatic.      Nose: Nose normal.   Eyes:      General: No scleral icterus.     Pupils: Pupils are equal, round, and reactive to light.   Neck:      Trachea: Trachea normal.   Cardiovascular:      Rate and Rhythm: Normal rate and regular rhythm.      Pulses: Normal pulses.      Heart sounds: Normal heart sounds. No murmur heard.   No friction rub. No gallop.    Pulmonary:      Effort: Pulmonary effort is normal.      Breath sounds: Normal breath sounds. No wheezing, rhonchi or rales.   Abdominal:      Palpations: Abdomen is soft.      Tenderness: There is no abdominal tenderness. There is no guarding.   Musculoskeletal:      Cervical back: Normal range of motion and neck supple.      Right lower le+ Pitting Edema present.      Left lower le+ Pitting Edema present.   Lymphadenopathy:      Cervical: No cervical adenopathy.      Upper Body:      Right upper body: No supraclavicular adenopathy.      Left upper body: No supraclavicular adenopathy.   Skin:     General: Skin is warm and dry.   Neurological:      General: No focal deficit present.      Mental Status: She is alert and oriented to person, place, and time.      Sensory: No sensory deficit.   Psychiatric:         Attention and Perception: Attention normal.         Mood and Affect: Mood and affect normal.         Speech: Speech normal.         Behavior: Behavior normal. Behavior is cooperative.         Thought Content: Thought content normal.         Cognition and Memory: Cognition normal.         Judgment: Judgment normal.         Maria C Shrestha reports a pain score of 0.  Given her pain  assessment as noted, treatment options were discussed and the following options were decided upon as a follow-up plan to address the patient's pain: referral to Primary Care for assistance in pain treatment guidance.      Patient's Body mass index is 64.07 kg/m². BMI is above normal parameters. Recommendations include: referral to primary care.      LABS    Lab Results - Last 18 Months   Lab Units 04/14/21  1340 03/17/21  1301 03/17/21  0904 02/17/21  1253 01/18/21  1321 12/21/20  1400 11/11/20  1302   HEMOGLOBIN g/dL 10.0* 9.8* 9.9* 10.3* 11.1* 10.5* 10.4*   HEMATOCRIT % 30.2* 29.8* 30.6* 32.3* 34.2 31.9* 32.2*   MCV fL 91.0 92.3 91.6 92.8 92.4 93.0 93.1   WBC 10*3/mm3 9.35 9.36 8.60 9.87 9.96 10.46 8.81   RDW % 14.8 15.3 14.8 15.2 15.2 15.4 15.5*   MPV fL 10.0 10.2 8.9 9.8 10.4 10.5 10.2   PLATELETS 10*3/mm3 209 186 220 212 217 193 197   IMM GRAN % % 0.6* 0.4  --  0.6* 0.8* 0.6* 0.5   NEUTROS ABS 10*3/mm3 6.67 6.86 6.30 7.04* 7.28* 7.75* 6.57   LYMPHS ABS 10*3/mm3 1.82 1.72 1.90 1.92 1.84 1.88 1.63   MONOS ABS 10*3/mm3 0.56 0.44  --  0.54 0.47 0.49 0.37   EOS ABS 10*3/mm3 0.20 0.26  --  0.27 0.24 0.25 0.17   BASOS ABS 10*3/mm3 0.04 0.04  --  0.04 0.05 0.03 0.03   IMMATURE GRANS (ABS) 10*3/mm3 0.06* 0.04  --  0.06* 0.08* 0.06* 0.04   NRBC /100 WBC 0.0 0.0  --  0.0 0.0 0.0 0.0       Lab Results - Last 18 Months   Lab Units 03/17/21  1301 03/17/21  0904 02/17/21  1253 01/18/21  1321 12/21/20  1400 11/11/20  1302   GLUCOSE mg/dL 207* 145* 135* 280* 208* 175*   SODIUM mmol/L 141 138 141 139 141 141   POTASSIUM mmol/L 4.0 3.8 4.0 4.4 4.1 4.6   CO2 mmol/L 22.0 22.0* 26.0 23.0 22.0 23.0   CHLORIDE mmol/L 107 108 108* 106 109* 108*   ANION GAP mmol/L 12.0 8.0 7.0 10.0 10.0 10.0   CREATININE mg/dL 2.75* 2.74* 2.55* 2.84* 2.46* 2.68*   BUN mg/dL 34* 33* 32* 48* 50* 46*   BUN / CREAT RATIO  12.4 12.0 12.5 16.9 20.3 17.2   CALCIUM mg/dL 8.5* 8.1*  8.5 8.9 9.5 9.2 9.0   EGFR IF NONAFRICN AM mL/min/1.73 18* 18* 19* 17* 20* 18*    ALK PHOS U/L 89 89 89 102 82 77   TOTAL PROTEIN g/dL 6.4 6.7 6.9 7.5 7.0 6.6   ALT (SGPT) U/L 9 10 9 6 7 7   AST (SGOT) U/L 14 18 10 10 9 11   BILIRUBIN mg/dL <0.2 0.2 0.2 0.2 0.2 0.2   ALBUMIN g/dL 3.10* 3.30* 3.30* 3.60 3.80 3.70   GLOBULIN gm/dL 3.3 3.4 3.6 3.9 3.2 2.9       No results for input(s): MSPIKE, KAPPALAMB, IGLFLC, URICACID, FREEKAPPAL, CEA, LDH, REFLABREPO in the last 91439 hours.    Lab Results - Last 18 Months   Lab Units 03/17/21  1301 03/17/21  0904 02/17/21  1253 10/14/20  1023 08/19/20  1255 07/15/20  1255 07/15/20  1006 04/22/20  0816 04/13/20  1038 01/08/20  0923 12/18/19  1137   IRON mcg/dL 42  --  66 90 83 56  --  72  --   --   --    TIBC mcg/dL 261*  --  297* 273* 256* 308  --  288*  --   --   --    IRON SATURATION % 16*  --  22 33 32 18*  --  25  --   --   --    FERRITIN ng/mL 322.40*  --  466.10* 405.70* 639.60* 135.70  --  135.10  --   --   --    TSH uIU/mL  --  4.940*  --  2.650  --   --  3.010  --  7.250* 3.590 3.640     Assessment    1.  Type 2 diabetes-patient currently taking Trulicity, evolocumab, Humalog, and Lantus. Blood sugar on today's labs 126.  Managed by endocrinologist Dr. Patel.    2.  Hypertension-patient currently taking amlodipine, carvedilol, and lisinopril.  Blood pressure in office today 168/68 . Managed by primary care physician.    3.  Anxiety-patient currently taking citalopram, diazepam.  Managed by primary care physician.    4.  Hypothyroidism-patient currently taking levothyroxine.  Managed by endocrinologist Dr. Patel.              .  5.  Hyperlipidemia-patient currently taking rosuvastatin. Managed by primary care physician.     6.  Chronic kidney disease stage IV-GFR on today's labs 15. Managed by nephrologist Dr. Davis    7.  Anemia due to chronic kidney disease stage IV-today's labs shared with patient in office.  See labs above.  Patient is eligible for Procrit today.    Plan    1.  Continue to monitor labs monthly.    2.  Iron replacement as  needed.      3.  Procrit to be given if meets guidelines.  Patient to receive Procrit today.    4.  Patient to continue following up with primary care physician and specialists.    5.  The patient is asked to return in 1 month with preoffice labs CBC and CMP.    I spent 37 minutes caring for Maria C on this date of service. This time includes time spent by me in the following activities: preparing for the visit, reviewing tests, performing a medically appropriate examination and/or evaluation, counseling and educating the patient/family/caregiver and documenting information in the medical record.     FLOR Soares  04/14/2021  15:05 CDT

## 2021-04-13 DIAGNOSIS — N18.4 ANEMIA DUE TO STAGE 4 CHRONIC KIDNEY DISEASE (HCC): ICD-10-CM

## 2021-04-13 DIAGNOSIS — D63.1 ANEMIA DUE TO STAGE 4 CHRONIC KIDNEY DISEASE (HCC): ICD-10-CM

## 2021-04-13 DIAGNOSIS — N18.4 STAGE 4 CHRONIC KIDNEY DISEASE (HCC): Primary | ICD-10-CM

## 2021-04-14 ENCOUNTER — OFFICE VISIT (OUTPATIENT)
Dept: ONCOLOGY | Facility: CLINIC | Age: 61
End: 2021-04-14

## 2021-04-14 ENCOUNTER — INFUSION (OUTPATIENT)
Dept: ONCOLOGY | Facility: HOSPITAL | Age: 61
End: 2021-04-14

## 2021-04-14 ENCOUNTER — LAB (OUTPATIENT)
Dept: LAB | Facility: HOSPITAL | Age: 61
End: 2021-04-14

## 2021-04-14 VITALS
HEIGHT: 65 IN | SYSTOLIC BLOOD PRESSURE: 168 MMHG | HEART RATE: 68 BPM | DIASTOLIC BLOOD PRESSURE: 68 MMHG | TEMPERATURE: 97.5 F | BODY MASS INDEX: 48.82 KG/M2 | WEIGHT: 293 LBS | OXYGEN SATURATION: 99 % | RESPIRATION RATE: 16 BRPM

## 2021-04-14 DIAGNOSIS — N18.4 TYPE 2 DIABETES MELLITUS WITH STAGE 4 CHRONIC KIDNEY DISEASE, WITH LONG-TERM CURRENT USE OF INSULIN (HCC): ICD-10-CM

## 2021-04-14 DIAGNOSIS — N18.4 STAGE 4 CHRONIC KIDNEY DISEASE (HCC): ICD-10-CM

## 2021-04-14 DIAGNOSIS — D63.1 ANEMIA DUE TO CHRONIC KIDNEY DISEASE, UNSPECIFIED CKD STAGE: Primary | ICD-10-CM

## 2021-04-14 DIAGNOSIS — D63.1 ANEMIA DUE TO CHRONIC KIDNEY DISEASE, UNSPECIFIED CKD STAGE: ICD-10-CM

## 2021-04-14 DIAGNOSIS — N18.4 ANEMIA DUE TO STAGE 4 CHRONIC KIDNEY DISEASE (HCC): ICD-10-CM

## 2021-04-14 DIAGNOSIS — N18.4 STAGE 4 CHRONIC KIDNEY DISEASE (HCC): Primary | ICD-10-CM

## 2021-04-14 DIAGNOSIS — D63.1 ANEMIA DUE TO STAGE 4 CHRONIC KIDNEY DISEASE (HCC): ICD-10-CM

## 2021-04-14 DIAGNOSIS — I10 HYPERTENSION, UNSPECIFIED TYPE: ICD-10-CM

## 2021-04-14 DIAGNOSIS — N18.9 ANEMIA DUE TO CHRONIC KIDNEY DISEASE, UNSPECIFIED CKD STAGE: ICD-10-CM

## 2021-04-14 DIAGNOSIS — E11.22 TYPE 2 DIABETES MELLITUS WITH STAGE 4 CHRONIC KIDNEY DISEASE, WITH LONG-TERM CURRENT USE OF INSULIN (HCC): ICD-10-CM

## 2021-04-14 DIAGNOSIS — N18.9 ANEMIA DUE TO CHRONIC KIDNEY DISEASE, UNSPECIFIED CKD STAGE: Primary | ICD-10-CM

## 2021-04-14 DIAGNOSIS — Z79.4 TYPE 2 DIABETES MELLITUS WITH STAGE 4 CHRONIC KIDNEY DISEASE, WITH LONG-TERM CURRENT USE OF INSULIN (HCC): ICD-10-CM

## 2021-04-14 LAB
ALBUMIN SERPL-MCNC: 3.1 G/DL (ref 3.5–5.2)
ALBUMIN/GLOB SERPL: 1 G/DL
ALP SERPL-CCNC: 85 U/L (ref 39–117)
ALT SERPL W P-5'-P-CCNC: 7 U/L (ref 1–33)
ANION GAP SERPL CALCULATED.3IONS-SCNC: 13 MMOL/L (ref 5–15)
AST SERPL-CCNC: 11 U/L (ref 1–32)
BASOPHILS # BLD AUTO: 0.04 10*3/MM3 (ref 0–0.2)
BASOPHILS NFR BLD AUTO: 0.4 % (ref 0–1.5)
BILIRUB SERPL-MCNC: 0.2 MG/DL (ref 0–1.2)
BUN SERPL-MCNC: 28 MG/DL (ref 8–23)
BUN/CREAT SERPL: 8.7 (ref 7–25)
CALCIUM SPEC-SCNC: 8 MG/DL (ref 8.6–10.5)
CHLORIDE SERPL-SCNC: 106 MMOL/L (ref 98–107)
CO2 SERPL-SCNC: 24 MMOL/L (ref 22–29)
CREAT SERPL-MCNC: 3.21 MG/DL (ref 0.57–1)
DEPRECATED RDW RBC AUTO: 49.4 FL (ref 37–54)
EOSINOPHIL # BLD AUTO: 0.2 10*3/MM3 (ref 0–0.4)
EOSINOPHIL NFR BLD AUTO: 2.1 % (ref 0.3–6.2)
ERYTHROCYTE [DISTWIDTH] IN BLOOD BY AUTOMATED COUNT: 14.8 % (ref 12.3–15.4)
FERRITIN SERPL-MCNC: 538.7 NG/ML (ref 13–150)
GFR SERPL CREATININE-BSD FRML MDRD: 15 ML/MIN/1.73
GLOBULIN UR ELPH-MCNC: 3.2 GM/DL
GLUCOSE SERPL-MCNC: 126 MG/DL (ref 65–99)
HCT VFR BLD AUTO: 30.2 % (ref 34–46.6)
HGB BLD-MCNC: 10 G/DL (ref 12–15.9)
IMM GRANULOCYTES # BLD AUTO: 0.06 10*3/MM3 (ref 0–0.05)
IMM GRANULOCYTES NFR BLD AUTO: 0.6 % (ref 0–0.5)
IRON 24H UR-MRATE: 55 MCG/DL (ref 37–145)
IRON SATN MFR SERPL: 21 % (ref 20–50)
LYMPHOCYTES # BLD AUTO: 1.82 10*3/MM3 (ref 0.7–3.1)
LYMPHOCYTES NFR BLD AUTO: 19.5 % (ref 19.6–45.3)
MCH RBC QN AUTO: 30.1 PG (ref 26.6–33)
MCHC RBC AUTO-ENTMCNC: 33.1 G/DL (ref 31.5–35.7)
MCV RBC AUTO: 91 FL (ref 79–97)
MONOCYTES # BLD AUTO: 0.56 10*3/MM3 (ref 0.1–0.9)
MONOCYTES NFR BLD AUTO: 6 % (ref 5–12)
NEUTROPHILS NFR BLD AUTO: 6.67 10*3/MM3 (ref 1.7–7)
NEUTROPHILS NFR BLD AUTO: 71.4 % (ref 42.7–76)
NRBC BLD AUTO-RTO: 0 /100 WBC (ref 0–0.2)
PLATELET # BLD AUTO: 209 10*3/MM3 (ref 140–450)
PMV BLD AUTO: 10 FL (ref 6–12)
POTASSIUM SERPL-SCNC: 3.4 MMOL/L (ref 3.5–5.2)
PROT SERPL-MCNC: 6.3 G/DL (ref 6–8.5)
RBC # BLD AUTO: 3.32 10*6/MM3 (ref 3.77–5.28)
SODIUM SERPL-SCNC: 143 MMOL/L (ref 136–145)
TIBC SERPL-MCNC: 268 MCG/DL (ref 298–536)
TRANSFERRIN SERPL-MCNC: 180 MG/DL (ref 200–360)
WBC # BLD AUTO: 9.35 10*3/MM3 (ref 3.4–10.8)

## 2021-04-14 PROCEDURE — 25010000002 EPOETIN ALFA-EPBX 40000 UNIT/ML SOLUTION: Performed by: NURSE PRACTITIONER

## 2021-04-14 PROCEDURE — 36415 COLL VENOUS BLD VENIPUNCTURE: CPT

## 2021-04-14 PROCEDURE — 83540 ASSAY OF IRON: CPT

## 2021-04-14 PROCEDURE — 85025 COMPLETE CBC W/AUTO DIFF WBC: CPT

## 2021-04-14 PROCEDURE — 82728 ASSAY OF FERRITIN: CPT

## 2021-04-14 PROCEDURE — 99214 OFFICE O/P EST MOD 30 MIN: CPT | Performed by: NURSE PRACTITIONER

## 2021-04-14 PROCEDURE — 84466 ASSAY OF TRANSFERRIN: CPT

## 2021-04-14 PROCEDURE — 80053 COMPREHEN METABOLIC PANEL: CPT | Performed by: NURSE PRACTITIONER

## 2021-04-14 PROCEDURE — 96372 THER/PROPH/DIAG INJ SC/IM: CPT

## 2021-04-14 RX ADMIN — EPOETIN ALFA-EPBX 40000 UNITS: 40000 INJECTION, SOLUTION INTRAVENOUS; SUBCUTANEOUS at 15:12

## 2021-04-30 RX ORDER — LEVOTHYROXINE SODIUM 88 UG/1
TABLET ORAL
Qty: 30 TABLET | Refills: 0 | Status: SHIPPED | OUTPATIENT
Start: 2021-04-30 | End: 2021-05-27 | Stop reason: SDUPTHER

## 2021-05-03 ENCOUNTER — TELEPHONE (OUTPATIENT)
Dept: VASCULAR SURGERY | Age: 61
End: 2021-05-03

## 2021-05-03 PROBLEM — R09.82 ALLERGIC RHINITIS WITH POSTNASAL DRIP: Status: ACTIVE | Noted: 2021-04-06

## 2021-05-03 PROBLEM — E78.1 HYPERTRIGLYCERIDEMIA: Status: ACTIVE | Noted: 2017-02-26

## 2021-05-03 PROBLEM — N18.4 STAGE 4 CHRONIC KIDNEY DISEASE (HCC): Status: ACTIVE | Noted: 2018-07-24

## 2021-05-03 PROBLEM — J30.9 ALLERGIC RHINITIS WITH POSTNASAL DRIP: Status: ACTIVE | Noted: 2021-04-06

## 2021-05-03 PROBLEM — K76.9 DISEASE OF LIVER: Status: ACTIVE | Noted: 2019-10-09

## 2021-05-03 PROBLEM — F39 MOOD DISORDER (HCC): Status: ACTIVE | Noted: 2017-02-26

## 2021-05-03 PROBLEM — I82.409 DEEP VEIN THROMBOSIS OF LOWER EXTREMITY (HCC): Status: ACTIVE | Noted: 2019-10-09

## 2021-05-03 RX ORDER — CHLORAL HYDRATE 500 MG
1000 CAPSULE ORAL
Status: ON HOLD | COMMUNITY
End: 2022-06-27 | Stop reason: HOSPADM

## 2021-05-03 RX ORDER — GUAIFENESIN 600 MG/1
600 TABLET, EXTENDED RELEASE ORAL 2 TIMES DAILY
COMMUNITY
Start: 2021-04-06 | End: 2021-05-06

## 2021-05-03 RX ORDER — INSULIN LISPRO 200 [IU]/ML
25-80 INJECTION, SOLUTION SUBCUTANEOUS
Status: ON HOLD | COMMUNITY
Start: 2020-10-21 | End: 2022-06-27 | Stop reason: HOSPADM

## 2021-05-03 RX ORDER — PEN NEEDLE, DIABETIC 31 GX5/16"
NEEDLE, DISPOSABLE MISCELLANEOUS
COMMUNITY
Start: 2020-10-21 | End: 2022-09-23

## 2021-05-03 RX ORDER — CETIRIZINE HYDROCHLORIDE 10 MG/1
10 TABLET ORAL PRN
COMMUNITY

## 2021-05-03 RX ORDER — FLUTICASONE PROPIONATE 50 MCG
2 SPRAY, SUSPENSION (ML) NASAL DAILY PRN
COMMUNITY
Start: 2021-03-22

## 2021-05-03 RX ORDER — BLOOD SUGAR DIAGNOSTIC
STRIP MISCELLANEOUS
COMMUNITY
Start: 2020-10-27 | End: 2022-09-23

## 2021-05-03 RX ORDER — AZELASTINE 1 MG/ML
2 SPRAY, METERED NASAL 2 TIMES DAILY
COMMUNITY
Start: 2021-04-06 | End: 2021-06-16

## 2021-05-03 RX ORDER — LANCETS 28 GAUGE
EACH MISCELLANEOUS
COMMUNITY
Start: 2020-10-21 | End: 2022-09-23

## 2021-05-03 RX ORDER — DIAZEPAM 2 MG/1
2 TABLET ORAL PRN
COMMUNITY

## 2021-05-03 NOTE — TELEPHONE ENCOUNTER
I left a VM for Arturo Duran asking her to call our office back at her earliest convenience. We received a referral from [facility] to discuss dialysis access. I asked Arturo Duran to call back and we can get her a NP appt. to see one of our providers here in the office.

## 2021-05-03 NOTE — TELEPHONE ENCOUNTER
Returned Sara's phone call and scheduled her for a NP appt with Trupti Franklin on 5/19/21 at 9:15am. I made her aware we will discuss dialysis access placement at that time. Patient wants to bring  due to memory problems, I made her aware that is ok just to wear appropriate masks. They both had J&J Vaccine 2nd week of March 20221.

## 2021-05-11 DIAGNOSIS — N18.4 ANEMIA DUE TO STAGE 4 CHRONIC KIDNEY DISEASE (HCC): ICD-10-CM

## 2021-05-11 DIAGNOSIS — N18.4 STAGE 4 CHRONIC KIDNEY DISEASE (HCC): Primary | ICD-10-CM

## 2021-05-11 DIAGNOSIS — D63.1 ANEMIA DUE TO STAGE 4 CHRONIC KIDNEY DISEASE (HCC): ICD-10-CM

## 2021-05-12 ENCOUNTER — LAB (OUTPATIENT)
Dept: LAB | Facility: HOSPITAL | Age: 61
End: 2021-05-12

## 2021-05-12 ENCOUNTER — APPOINTMENT (OUTPATIENT)
Dept: ONCOLOGY | Facility: HOSPITAL | Age: 61
End: 2021-05-12

## 2021-05-12 ENCOUNTER — OFFICE VISIT (OUTPATIENT)
Dept: ONCOLOGY | Facility: CLINIC | Age: 61
End: 2021-05-12

## 2021-05-12 VITALS
DIASTOLIC BLOOD PRESSURE: 88 MMHG | HEIGHT: 65 IN | RESPIRATION RATE: 16 BRPM | TEMPERATURE: 97.7 F | BODY MASS INDEX: 48.82 KG/M2 | HEART RATE: 78 BPM | SYSTOLIC BLOOD PRESSURE: 178 MMHG | OXYGEN SATURATION: 98 % | WEIGHT: 293 LBS

## 2021-05-12 DIAGNOSIS — N18.4 ANEMIA DUE TO STAGE 4 CHRONIC KIDNEY DISEASE (HCC): ICD-10-CM

## 2021-05-12 DIAGNOSIS — E11.22 TYPE 2 DIABETES MELLITUS WITH STAGE 4 CHRONIC KIDNEY DISEASE, WITH LONG-TERM CURRENT USE OF INSULIN (HCC): ICD-10-CM

## 2021-05-12 DIAGNOSIS — E11.59 HYPERTENSION ASSOCIATED WITH DIABETES (HCC): ICD-10-CM

## 2021-05-12 DIAGNOSIS — D63.1 ANEMIA DUE TO STAGE 4 CHRONIC KIDNEY DISEASE (HCC): ICD-10-CM

## 2021-05-12 DIAGNOSIS — D63.1 ANEMIA DUE TO CHRONIC KIDNEY DISEASE, UNSPECIFIED CKD STAGE: ICD-10-CM

## 2021-05-12 DIAGNOSIS — D63.1 ANEMIA DUE TO CHRONIC KIDNEY DISEASE, UNSPECIFIED CKD STAGE: Primary | ICD-10-CM

## 2021-05-12 DIAGNOSIS — N18.4 TYPE 2 DIABETES MELLITUS WITH STAGE 4 CHRONIC KIDNEY DISEASE, WITH LONG-TERM CURRENT USE OF INSULIN (HCC): ICD-10-CM

## 2021-05-12 DIAGNOSIS — N18.4 STAGE 4 CHRONIC KIDNEY DISEASE (HCC): ICD-10-CM

## 2021-05-12 DIAGNOSIS — Z79.4 TYPE 2 DIABETES MELLITUS WITH STAGE 4 CHRONIC KIDNEY DISEASE, WITH LONG-TERM CURRENT USE OF INSULIN (HCC): ICD-10-CM

## 2021-05-12 DIAGNOSIS — N18.9 ANEMIA DUE TO CHRONIC KIDNEY DISEASE, UNSPECIFIED CKD STAGE: Primary | ICD-10-CM

## 2021-05-12 DIAGNOSIS — N18.9 ANEMIA DUE TO CHRONIC KIDNEY DISEASE, UNSPECIFIED CKD STAGE: ICD-10-CM

## 2021-05-12 DIAGNOSIS — I15.2 HYPERTENSION ASSOCIATED WITH DIABETES (HCC): ICD-10-CM

## 2021-05-12 LAB
ALBUMIN SERPL-MCNC: 3.1 G/DL (ref 3.5–5.2)
ALBUMIN/GLOB SERPL: 1 G/DL
ALP SERPL-CCNC: 88 U/L (ref 39–117)
ALT SERPL W P-5'-P-CCNC: 7 U/L (ref 1–33)
ANION GAP SERPL CALCULATED.3IONS-SCNC: 11 MMOL/L (ref 5–15)
AST SERPL-CCNC: 10 U/L (ref 1–32)
BASOPHILS # BLD AUTO: 0.04 10*3/MM3 (ref 0–0.2)
BASOPHILS NFR BLD AUTO: 0.5 % (ref 0–1.5)
BILIRUB SERPL-MCNC: 0.2 MG/DL (ref 0–1.2)
BUN SERPL-MCNC: 42 MG/DL (ref 8–23)
BUN/CREAT SERPL: 12.8 (ref 7–25)
CALCIUM SPEC-SCNC: 8.8 MG/DL (ref 8.6–10.5)
CHLORIDE SERPL-SCNC: 110 MMOL/L (ref 98–107)
CO2 SERPL-SCNC: 19 MMOL/L (ref 22–29)
CREAT SERPL-MCNC: 3.28 MG/DL (ref 0.57–1)
DEPRECATED RDW RBC AUTO: 51.5 FL (ref 37–54)
EOSINOPHIL # BLD AUTO: 0.24 10*3/MM3 (ref 0–0.4)
EOSINOPHIL NFR BLD AUTO: 2.9 % (ref 0.3–6.2)
ERYTHROCYTE [DISTWIDTH] IN BLOOD BY AUTOMATED COUNT: 15.2 % (ref 12.3–15.4)
ERYTHROCYTE [SEDIMENTATION RATE] IN BLOOD: 50 MM/HR (ref 0–20)
FERRITIN SERPL-MCNC: 462.3 NG/ML (ref 13–150)
GFR SERPL CREATININE-BSD FRML MDRD: 14 ML/MIN/1.73
GFR SERPL CREATININE-BSD FRML MDRD: ABNORMAL ML/MIN/{1.73_M2}
GLOBULIN UR ELPH-MCNC: 3.1 GM/DL
GLUCOSE SERPL-MCNC: 193 MG/DL (ref 65–99)
HCT VFR BLD AUTO: 33.3 % (ref 34–46.6)
HGB BLD-MCNC: 10.8 G/DL (ref 12–15.9)
IMM GRANULOCYTES # BLD AUTO: 0.05 10*3/MM3 (ref 0–0.05)
IMM GRANULOCYTES NFR BLD AUTO: 0.6 % (ref 0–0.5)
IRON 24H UR-MRATE: 54 MCG/DL (ref 37–145)
IRON SATN MFR SERPL: 22 % (ref 20–50)
LYMPHOCYTES # BLD AUTO: 1.03 10*3/MM3 (ref 0.7–3.1)
LYMPHOCYTES NFR BLD AUTO: 12.4 % (ref 19.6–45.3)
MCH RBC QN AUTO: 29.8 PG (ref 26.6–33)
MCHC RBC AUTO-ENTMCNC: 32.4 G/DL (ref 31.5–35.7)
MCV RBC AUTO: 92 FL (ref 79–97)
MONOCYTES # BLD AUTO: 0.44 10*3/MM3 (ref 0.1–0.9)
MONOCYTES NFR BLD AUTO: 5.3 % (ref 5–12)
NEUTROPHILS NFR BLD AUTO: 6.49 10*3/MM3 (ref 1.7–7)
NEUTROPHILS NFR BLD AUTO: 78.3 % (ref 42.7–76)
NRBC BLD AUTO-RTO: 0 /100 WBC (ref 0–0.2)
PLATELET # BLD AUTO: 219 10*3/MM3 (ref 140–450)
PMV BLD AUTO: 9.9 FL (ref 6–12)
POTASSIUM SERPL-SCNC: 4.2 MMOL/L (ref 3.5–5.2)
PROT SERPL-MCNC: 6.2 G/DL (ref 6–8.5)
RBC # BLD AUTO: 3.62 10*6/MM3 (ref 3.77–5.28)
SODIUM SERPL-SCNC: 140 MMOL/L (ref 136–145)
TIBC SERPL-MCNC: 244 MCG/DL (ref 298–536)
TRANSFERRIN SERPL-MCNC: 164 MG/DL (ref 200–360)
WBC # BLD AUTO: 8.29 10*3/MM3 (ref 3.4–10.8)

## 2021-05-12 PROCEDURE — 85025 COMPLETE CBC W/AUTO DIFF WBC: CPT

## 2021-05-12 PROCEDURE — 80053 COMPREHEN METABOLIC PANEL: CPT

## 2021-05-12 PROCEDURE — 36415 COLL VENOUS BLD VENIPUNCTURE: CPT

## 2021-05-12 PROCEDURE — 84466 ASSAY OF TRANSFERRIN: CPT

## 2021-05-12 PROCEDURE — 99213 OFFICE O/P EST LOW 20 MIN: CPT | Performed by: NURSE PRACTITIONER

## 2021-05-12 PROCEDURE — 85651 RBC SED RATE NONAUTOMATED: CPT

## 2021-05-12 PROCEDURE — 83540 ASSAY OF IRON: CPT

## 2021-05-12 PROCEDURE — 82728 ASSAY OF FERRITIN: CPT

## 2021-05-19 ENCOUNTER — OFFICE VISIT (OUTPATIENT)
Dept: VASCULAR SURGERY | Age: 61
End: 2021-05-19
Payer: MEDICARE

## 2021-05-19 VITALS
HEART RATE: 67 BPM | HEIGHT: 65 IN | DIASTOLIC BLOOD PRESSURE: 92 MMHG | WEIGHT: 293 LBS | OXYGEN SATURATION: 98 % | TEMPERATURE: 98.2 F | SYSTOLIC BLOOD PRESSURE: 134 MMHG | BODY MASS INDEX: 48.82 KG/M2 | RESPIRATION RATE: 18 BRPM

## 2021-05-19 DIAGNOSIS — Z01.818 PRE-OP TESTING: Primary | ICD-10-CM

## 2021-05-19 DIAGNOSIS — N18.4 CHRONIC KIDNEY DISEASE (CKD), STAGE IV (SEVERE) (HCC): Primary | ICD-10-CM

## 2021-05-19 PROCEDURE — G8417 CALC BMI ABV UP PARAM F/U: HCPCS | Performed by: NURSE PRACTITIONER

## 2021-05-19 PROCEDURE — G8428 CUR MEDS NOT DOCUMENT: HCPCS | Performed by: NURSE PRACTITIONER

## 2021-05-19 PROCEDURE — 3017F COLORECTAL CA SCREEN DOC REV: CPT | Performed by: NURSE PRACTITIONER

## 2021-05-19 PROCEDURE — 99204 OFFICE O/P NEW MOD 45 MIN: CPT | Performed by: NURSE PRACTITIONER

## 2021-05-19 PROCEDURE — 1036F TOBACCO NON-USER: CPT | Performed by: NURSE PRACTITIONER

## 2021-05-19 NOTE — PROGRESS NOTES
Arlene Heredia (:  1960) is a 64 y.o. female,New patient, here for evaluation of the following chief complaint(s):  New Patient (Discuss dialysis access)            SUBJECTIVE/OBJECTIVE:  She has a history of chronic kidney disease for a duration of 1 - 5 years. This is believed to be caused by diabetic nephropathy. She has experienced no problems. She is now stage IV    She has had no previous access. She is right handed. She is in need of hemodialysis access. Arlene Heredia is a 64 y.o. female with the following history as recorded in [facility]:  Patient Active Problem List    Diagnosis Date Noted    Allergic rhinitis with postnasal drip 2021    Deep vein thrombosis of lower extremity ([facility] [address] 10/09/2019    Disease of liver 10/09/2019    Stage 4 chronic kidney disease ([facility] [address] 2018    Thyroid disease 2017    Sleep apnea 2017    [email or web address] 2017    Type 2 DM with CKD stage 3 and hypertension ([facility] 2017    Hypertriglyceridemia 2017    Anxiety 2017    Vitamin D deficiency 2017    Anemia 2017    Hypocalcemia 2017    Anemia of chronic renal failure 2016    Hypertension associated with diabetes ([facility] [address] 10/17/2016    B12 deficiency 10/17/2016    Mixed diabetic hyperlipidemia associated with type 2 diabetes mellitus ([facility] [address] 10/17/2016    Morbid obesity ([email or web address] 09/15/2011    Embolism (HCC) 2004     Current Outpatient Medications   Medication Sig Dispense Refill    azelastine (ASTELIN) 0.1 % nasal spray 2 sprays by Nasal route 2 times daily      cetirizine (ZYRTEC) 10 MG tablet Take 10 mg by mouth as needed      Cholecalciferol 1.25 MG (54138 UT) TABS Take 1 capsule by mouth Twice a Week      diazePAM (VALIUM) 2 MG tablet Take 2 mg by mouth as needed.       Evolocumab 140 MG/ML SOAJ Inject 140 mg into the skin every 14 days      fluticasone (FLONASE) 50 MCG/ACT nasal spray       insulin lispro (HUMALOG KWIKPEN) caps at lunch      Multiple Vitamins-Minerals (COMPLETE MULTIVITAMIN/MINERAL PO) Take by mouth      Calcium Carb-Cholecalciferol (CALCIUM 600/VITAMIN D3) 600-800 MG-UNIT TABS Take by mouth 2 times daily       citalopram (CELEXA) 40 MG tablet Take 40 mg by mouth daily      rosuvastatin (CRESTOR) 10 MG tablet Take 10 mg by mouth daily      carvedilol (COREG) 3.125 MG tablet Take 3.125 mg by mouth 2 times daily (with meals)      sodium bicarbonate 650 MG tablet Take 650 mg by mouth 3 times daily       busPIRone (BUSPAR) 10 MG tablet Take 20 mg by mouth 2 times daily      aspirin 81 MG tablet Take 81 mg by mouth daily With Dinner      Insulin Glargine (LANTUS SC) Inject 192 Units into the skin daily With Evening Meals       No current facility-administered medications for this visit.      Allergies: Codeine  Past Medical History:   Diagnosis Date    Anxiety     CKD (chronic kidney disease)     Colon polyp     Depression     Embolism - blood clot 2004    Hyperlipidemia     Hypertension     Obesity     Sleep apnea     Thyroid disease     Type II or unspecified type diabetes mellitus without mention of complication, not stated as uncontrolled      Past Surgical History:   Procedure Laterality Date    CHOLECYSTECTOMY      DILATION AND CURETTAGE OF UTERUS      HYSTERECTOMY  10/17/2018    POLYPECTOMY      RECTAL SURGERY      fistula repair    TONSILLECTOMY AND ADENOIDECTOMY       Family History   Problem Relation Age of Onset    Lung Cancer Mother     Brain Cancer Mother     Heart Attack Father     Prostate Cancer Father     Heart Disease Father     Stroke Father     Uterine Cancer Maternal Grandmother     Cervical Cancer Maternal Grandmother     Diabetes Maternal Grandfather     Other Maternal Grandfather         histoplasmosis    Diabetes Paternal Grandfather      Social History     Tobacco Use    Smoking status: Never Smoker    Smokeless tobacco: Never Used   Substance Use Topics    Alcohol use: No       ROS  Eyes - no sudden vision change or amaurosis. Respiratory - no significant shortness of breath,  Cardiovascular - no chest pain or syncope. No  significant leg swelling. No claudication. Musculoskeletal - no gait disturbance; has back pain  Skin - no new wound. Neurologic -  No speech difficulty or lateralizing weakness. All other review of systems are negative. Physical Exam    Due to this being a TeleHealth encounter, evaluation of the following organ systems is limited: Vitals/Constitutional/EENT/Resp/CV/GI//MS/Neuro/Skin/Heme-Lymph-Imm. Constitutional - well developed, well nourished. No diaphoresis or acute distress. Neck- ROM appears normal  Extremities -No cyanosis, clubbing, no edema. No signs atheroembolic event. Pulmonary - effort appears normal.  No respiratory distress. No accessory muscle use  Neurologic - alert and oriented X 3. Cranial Nerves II-XII grossly intact  Skin - intact. No rash, erythema, or pallor. Psychiatric - mood, affect, and behavior appear normal.  Judgment and thought processes appear normal.    Risk factors for atherosclerosis of all vascular beds have been reviewed with the patient including:  Family history, tobacco abuse in all forms, elevated cholesterol, hyperlipidemia, and diabetes. Reviewed on this visit: speciality care notes from nephrology    Options have been discussed with the patient including continued medical management vs. proceeding with av fistula. Patient has opted to proceed with av access. Risks have been discussed with the patient including but not limited to MI, death, CVA, bleed, nerve injury, steal syndrome, and infection. ASSESSMENT/PLAN:  1. Chronic kidney disease (CKD), stage IV (severe) ([address]    1.  Chronic kidney disease (CKD), stage IV (severe) (HCC)     chronic illness with severe exacerbation/progression    Proceed with av access - left brachial cephalic av fistula  Vein mapping first - reviewed and she is a candidate for above            Call with any increased venous pressure, arterial pressure, infiltrations, decreased flow rate, or post procedure bleeding. Let us know if you experience any hand pain or ulcers of that hand   An electronic signature was used to authenticate this note.     --Jabier Cosme, APRN

## 2021-05-20 ENCOUNTER — HOSPITAL ENCOUNTER (OUTPATIENT)
Dept: VASCULAR LAB | Age: 61
Discharge: HOME | End: 2021-05-20
Payer: MEDICARE

## 2021-05-20 DIAGNOSIS — Z01.818 PRE-OP TESTING: ICD-10-CM

## 2021-05-20 LAB — REF LAB TEST METHOD: NORMAL

## 2021-05-20 PROCEDURE — 93985 DUP-SCAN HEMO COMPL BI STD: CPT

## 2021-05-27 ENCOUNTER — TELEPHONE (OUTPATIENT)
Dept: ENDOCRINOLOGY | Facility: CLINIC | Age: 61
End: 2021-05-27

## 2021-05-27 DIAGNOSIS — E03.9 ACQUIRED HYPOTHYROIDISM: Primary | ICD-10-CM

## 2021-05-27 RX ORDER — LEVOTHYROXINE SODIUM 88 UG/1
88 TABLET ORAL DAILY
Qty: 90 TABLET | Refills: 3 | Status: SHIPPED | OUTPATIENT
Start: 2021-05-27 | End: 2022-01-25 | Stop reason: SDUPTHER

## 2021-05-27 NOTE — TELEPHONE ENCOUNTER
PT is needing refill on her levothyroxine [facility] in [place]    Can she get it as 3 mo supply with 3 refills, she was just wondering.     Also she was wanting you to know she has started the process for dialysis.    Thank you

## 2021-05-28 ENCOUNTER — OFFICE VISIT (OUTPATIENT)
Dept: FAMILY MEDICINE CLINIC | Facility: CLINIC | Age: 61
End: 2021-05-28

## 2021-05-28 VITALS
DIASTOLIC BLOOD PRESSURE: 79 MMHG | TEMPERATURE: 97.9 F | HEIGHT: 66 IN | BODY MASS INDEX: 47.09 KG/M2 | RESPIRATION RATE: 20 BRPM | SYSTOLIC BLOOD PRESSURE: 148 MMHG | HEART RATE: 68 BPM | WEIGHT: 293 LBS

## 2021-05-28 DIAGNOSIS — S60.551A SPLINTER OF RIGHT HAND: Primary | ICD-10-CM

## 2021-05-28 PROCEDURE — 99213 OFFICE O/P EST LOW 20 MIN: CPT | Performed by: NURSE PRACTITIONER

## 2021-05-28 RX ORDER — SODIUM CHLORIDE 0.9 % (FLUSH) 0.9 %
10 SYRINGE (ML) INJECTION EVERY 12 HOURS SCHEDULED
Status: CANCELLED | OUTPATIENT
Start: 2021-05-28

## 2021-05-28 RX ORDER — SODIUM CHLORIDE 0.9 % (FLUSH) 0.9 %
10 SYRINGE (ML) INJECTION PRN
Status: CANCELLED | OUTPATIENT
Start: 2021-05-28

## 2021-05-28 RX ORDER — ASPIRIN 81 MG/1
81 TABLET ORAL ONCE
Status: CANCELLED | OUTPATIENT
Start: 2021-05-28 | End: 2021-05-28

## 2021-05-28 RX ORDER — SODIUM CHLORIDE 9 MG/ML
25 INJECTION, SOLUTION INTRAVENOUS PRN
Status: CANCELLED | OUTPATIENT
Start: 2021-05-28

## 2021-05-28 NOTE — H&P (VIEW-ONLY)
Fazal Newsome (:  1960) is a 64 y.o. female,New patient, here for evaluation of the following chief complaint(s):  New Patient (Discuss dialysis access)            SUBJECTIVE/OBJECTIVE:  She has a history of chronic kidney disease for a duration of 1 - 5 years. This is believed to be caused by diabetic nephropathy. She has experienced no problems. She is now stage IV    She has had no previous access. She is right handed. She is in need of hemodialysis access. Fazal Newsome is a 64 y.o. female with the following history as [facility] in [facility]:  Patient Active Problem List    Diagnosis Date Noted    Allergic rhinitis with postnasal drip 2021    Deep vein thrombosis of lower extremity ([facility] [address] 10/09/2019    Disease of liver 10/09/2019    Stage 4 chronic kidney disease ([facility] [address] 2018    Thyroid disease 2017    Sleep apnea 2017    Mood disorder ([facility] [address] 2017    Type 2 DM with CKD stage 3 and hypertension ([facility] [address] 2017    Hypertriglyceridemia 2017    Anxiety 2017    Vitamin D deficiency 2017    Anemia 2017    Hypocalcemia 2017    Anemia of chronic renal failure 2016    Hypertension associated with diabetes ([facility] [address] 10/17/2016    B12 deficiency 10/17/2016    Mixed diabetic hyperlipidemia associated with type 2 diabetes mellitus ([facility] [address] 10/17/2016    Morbid obesity ([facility] [address] 09/15/2011    Embolism (HCC) 2004     Current Outpatient Medications   Medication Sig Dispense Refill    azelastine (ASTELIN) 0.1 % nasal spray 2 sprays by Nasal route 2 times daily      cetirizine (ZYRTEC) 10 MG tablet Take 10 mg by mouth as needed      Cholecalciferol 1.25 MG (22282 UT) TABS Take 1 capsule by mouth Twice a Week      diazePAM (VALIUM) 2 MG tablet Take 2 mg by mouth as needed.       Evolocumab 140 MG/ML SOAJ Inject 140 mg into the skin every 14 days      fluticasone (FLONASE) 50 MCG/ACT nasal spray       insulin lispro (HUMALOG KWIKPEN) 200 UNIT/ML SOPN pen Inject 80 Units into the skin 3 times daily (with meals)      Insulin Pen Needle (B-D ULTRAFINE III SHORT PEN) 31G X 8 MM MISC USE AS DIRECTED TO INJECT FOUR TIMES DAILY      Insulin Syringe-Needle U-100 (BD INSULIN SYRINGE U/F) 31G X 5/16\" 1 ML MISC AS DIRECTED FOUR TIMES A DAY      FreeStyle Lancets MISC Use as instructed 4 x daily      mupirocin (BACTROBAN) 2 % ointment Apply topically 3 times daily      Omega-3 Fatty Acids (FISH OIL) 1000 MG CAPS Take 2,000 mg by mouth Daily with supper      potassium gluconate 550 mg tablet potassium gluconate 595 mg (99 mg) tablet   Take 1 tablets every day by oral route.  epoetin jose ramon (EPOGEN;PROCRIT) 79541 UNIT/ML injection Inject 10,000 Units into the skin three times a week      furosemide (LASIX) 20 MG tablet Take 1 tablet by mouth daily for 15 days 15 tablet 0    lisinopril (PRINIVIL;ZESTRIL) 20 MG tablet Take 1 tablet by mouth 2 times daily May resume on Saturday (Patient taking differently: Take 40 mg by mouth 2 times daily May resume on Saturday) 1 tablet 0    Dulaglutide 1.5 MG/0.5ML SOPN Inject 0.75 mg into the skin once a week       ferrous sulfate 325 (65 FE) MG tablet Take 325 mg by mouth 2 times daily (before meals)       levothyroxine (SYNTHROID) 50 MCG tablet Take 50 mcg by mouth Daily      lamoTRIgine (LAMICTAL) 100 MG tablet Take 100 mg by mouth daily Indications: 1/2 tablet with dinner      insulin regular (HUMULIN R;NOVOLIN R) 100 UNIT/ML injection Inject into the skin See Admin Instructions Indications: 20u q am, 5-10 u at lunch and dinner.       amLODIPine (NORVASC) 5 MG tablet Take 10 mg by mouth daily WIT LUNCH      allopurinol (ZYLOPRIM) 100 MG tablet Take 100 mg by mouth 2 times daily      Ergocalciferol (VITAMIN D2 PO) Take 50,000 Units by mouth once a week      calcitRIOL (ROCALTROL) 0.25 MCG capsule Take 0.25 mcg by mouth every other day      Potassium 99 MG TABS Take 99 mg by mouth daily Indications: 2 caps at lunch      Multiple Vitamins-Minerals (COMPLETE MULTIVITAMIN/MINERAL PO) Take by mouth      Calcium Carb-Cholecalciferol (CALCIUM 600/VITAMIN D3) 600-800 MG-UNIT TABS Take by mouth 2 times daily       citalopram (CELEXA) 40 MG tablet Take 40 mg by mouth daily      rosuvastatin (CRESTOR) 10 MG tablet Take 10 mg by mouth daily      carvedilol (COREG) 3.125 MG tablet Take 3.125 mg by mouth 2 times daily (with meals)      sodium bicarbonate 650 MG tablet Take 650 mg by mouth 3 times daily       busPIRone (BUSPAR) 10 MG tablet Take 20 mg by mouth 2 times daily      aspirin 81 MG tablet Take 81 mg by mouth daily With Dinner      Insulin Glargine (LANTUS SC) Inject 192 Units into the skin daily With Evening Meals       No current facility-administered medications for this visit.      Allergies: Codeine  Past Medical History:   Diagnosis Date    Anxiety     CKD (chronic kidney disease)     Colon polyp     Depression     Embolism - blood clot 2004    Hyperlipidemia     Hypertension     Obesity     Sleep apnea     Thyroid disease     Type II or unspecified type diabetes mellitus without mention of complication, not stated as uncontrolled      Past Surgical History:   Procedure Laterality Date    CHOLECYSTECTOMY      DILATION AND CURETTAGE OF UTERUS      HYSTERECTOMY  10/17/2018    POLYPECTOMY      RECTAL SURGERY      fistula repair    TONSILLECTOMY AND ADENOIDECTOMY       Family History   Problem Relation Age of Onset    Lung Cancer Mother     Brain Cancer Mother     Heart Attack Father     Prostate Cancer Father     Heart Disease Father     Stroke Father     Uterine Cancer Maternal Grandmother     Cervical Cancer Maternal Grandmother     Diabetes Maternal Grandfather     Other Maternal Grandfather         histoplasmosis    Diabetes Paternal Grandfather      Social History     Tobacco Use    Smoking status: Never Smoker    Smokeless tobacco: Never Used   Substance Use Topics    Alcohol use: No       ROS  Eyes  no sudden vision change or amaurosis. Respiratory  no significant shortness of breath,  Cardiovascular  no chest pain or syncope. No  significant leg swelling. No claudication. Musculoskeletal  no gait disturbance; has back pain  Skin  no new wound. Neurologic   No speech difficulty or lateralizing weakness. All other review of systems are negative. Physical Exam    Due to this being a TeleHealth encounter, evaluation of the following organ systems is limited: Vitals/Constitutional/EENT/Resp/CV/GI//MS/Neuro/Skin/Heme-Lymph-Imm. Constitutional  well developed, well nourished. No diaphoresis or acute distress. Neck- ROM appears normal  Extremities -No cyanosis, clubbing, no edema. No signs atheroembolic event. Pulmonary  effort appears normal.  No respiratory distress. No accessory muscle use  Neurologic  alert and oriented X 3. Cranial Nerves II-XII grossly intact  Skin  intact. No rash, erythema, or pallor. Psychiatric  mood, affect, and behavior appear normal.  Judgment and thought processes appear normal.    Risk factors for atherosclerosis of all vascular beds have been reviewed with the patient including:  Family history, tobacco abuse in all forms, elevated cholesterol, hyperlipidemia, and diabetes. Reviewed on this visit: speciality care notes from nephrology    Options have been discussed with the patient including continued medical management vs. proceeding with av fistula. Patient has opted to proceed with av access. Risks have been discussed with the patient including but not limited to MI, death, CVA, bleed, nerve injury, steal syndrome, and infection. ASSESSMENT/PLAN:  1. Chronic kidney disease (CKD), stage IV (severe) ([address]    1.  Chronic kidney disease (CKD), stage IV (severe) (HCC)     chronic illness with severe exacerbation/progression    Proceed with av access - left brachial cephalic av fistula

## 2021-05-28 NOTE — H&P
Maryann Soliman (:  1960) is a 64 y.o. female,New patient, here for evaluation of the following chief complaint(s):  New Patient (Discuss dialysis access)            SUBJECTIVE/OBJECTIVE:  She has a history of chronic kidney disease for a duration of 1 - 5 years. This is believed to be caused by diabetic nephropathy. She has experienced no problems. She is now stage IV    She has had no previous access. She is right handed. She is in need of hemodialysis access. Maryann Soliman is a 64 y.o. female with the following history as recorded in [facility]:  Patient Active Problem List    Diagnosis Date Noted    Allergic rhinitis with postnasal drip 2021    Deep vein thrombosis of lower extremity ([facility] [address] 10/09/2019    Disease of liver 10/09/2019    Stage 4 chronic kidney disease ([facility] [address] 2018    Thyroid disease 2017    Sleep apnea 2017    Mood disorder ([facility] [address] 2017    Type 2 DM with CKD stage 3 and hypertension ([facility] [address] 2017    Hypertriglyceridemia 2017    Anxiety 2017    Vitamin D deficiency 2017    Anemia 2017    Hypocalcemia 2017    Anemia of chronic renal failure 2016    Hypertension associated with diabetes ([facility] [address] 10/17/2016    B12 deficiency 10/17/2016    Mixed diabetic hyperlipidemia associated with type 2 diabetes mellitus ([facility] [address] 10/17/2016    Morbid obesity ([facility] [address] 09/15/2011    Embolism (HCC) 2004     Current Outpatient Medications   Medication Sig Dispense Refill    azelastine (ASTELIN) 0.1 % nasal spray 2 sprays by Nasal route 2 times daily      cetirizine (ZYRTEC) 10 MG tablet Take 10 mg by mouth as needed      Cholecalciferol 1.25 MG (78931 UT) TABS Take 1 capsule by mouth Twice a Week      diazePAM (VALIUM) 2 MG tablet Take 2 mg by mouth as needed.       Evolocumab 140 MG/ML SOAJ Inject 140 mg into the skin every 14 days      fluticasone (FLONASE) 50 MCG/ACT nasal spray       insulin lispro (HUMALOG KWIKPEN) 200 UNIT/ML SOPN pen Inject 80 Units into the skin 3 times daily (with meals)      Insulin Pen Needle (B-D ULTRAFINE III SHORT PEN) 31G X 8 MM MISC USE AS DIRECTED TO INJECT FOUR TIMES DAILY      Insulin Syringe-Needle U-100 (BD INSULIN SYRINGE U/F) 31G X 5/16\" 1 ML MISC AS DIRECTED FOUR TIMES A DAY      FreeStyle Lancets MISC Use as instructed 4 x daily      mupirocin (BACTROBAN) 2 % ointment Apply topically 3 times daily      Omega-3 Fatty Acids (FISH OIL) 1000 MG CAPS Take 2,000 mg by mouth Daily with supper      potassium gluconate 550 mg tablet potassium gluconate 595 mg (99 mg) tablet   Take 1 tablets every day by oral route.  epoetin jose ramon (EPOGEN;PROCRIT) 21357 UNIT/ML injection Inject 10,000 Units into the skin three times a week      furosemide (LASIX) 20 MG tablet Take 1 tablet by mouth daily for 15 days 15 tablet 0    lisinopril (PRINIVIL;ZESTRIL) 20 MG tablet Take 1 tablet by mouth 2 times daily May resume on Saturday (Patient taking differently: Take 40 mg by mouth 2 times daily May resume on Saturday) 1 tablet 0    Dulaglutide 1.5 MG/0.5ML SOPN Inject 0.75 mg into the skin once a week       ferrous sulfate 325 (65 FE) MG tablet Take 325 mg by mouth 2 times daily (before meals)       levothyroxine (SYNTHROID) 50 MCG tablet Take 50 mcg by mouth Daily      lamoTRIgine (LAMICTAL) 100 MG tablet Take 100 mg by mouth daily Indications: 1/2 tablet with dinner      insulin regular (HUMULIN R;NOVOLIN R) 100 UNIT/ML injection Inject into the skin See Admin Instructions Indications: 20u q am, 5-10 u at lunch and dinner.       amLODIPine (NORVASC) 5 MG tablet Take 10 mg by mouth daily WIT LUNCH      allopurinol (ZYLOPRIM) 100 MG tablet Take 100 mg by mouth 2 times daily      Ergocalciferol (VITAMIN D2 PO) Take 50,000 Units by mouth once a week      calcitRIOL (ROCALTROL) 0.25 MCG capsule Take 0.25 mcg by mouth every other day      Potassium 99 MG TABS Take 99 mg by mouth daily Indications: 2 caps at lunch      Multiple Vitamins-Minerals (COMPLETE MULTIVITAMIN/MINERAL PO) Take by mouth      Calcium Carb-Cholecalciferol (CALCIUM 600/VITAMIN D3) 600-800 MG-UNIT TABS Take by mouth 2 times daily       citalopram (CELEXA) 40 MG tablet Take 40 mg by mouth daily      rosuvastatin (CRESTOR) 10 MG tablet Take 10 mg by mouth daily      carvedilol (COREG) 3.125 MG tablet Take 3.125 mg by mouth 2 times daily (with meals)      sodium bicarbonate 650 MG tablet Take 650 mg by mouth 3 times daily       busPIRone (BUSPAR) 10 MG tablet Take 20 mg by mouth 2 times daily      aspirin 81 MG tablet Take 81 mg by mouth daily With Dinner      Insulin Glargine (LANTUS SC) Inject 192 Units into the skin daily With Evening Meals       No current facility-administered medications for this visit.      Allergies: Codeine  Past Medical History:   Diagnosis Date    Anxiety     CKD (chronic kidney disease)     Colon polyp     Depression     Embolism - blood clot 2004    Hyperlipidemia     Hypertension     Obesity     Sleep apnea     Thyroid disease     Type II or unspecified type diabetes mellitus without mention of complication, not stated as uncontrolled      Past Surgical History:   Procedure Laterality Date    CHOLECYSTECTOMY      DILATION AND CURETTAGE OF UTERUS      HYSTERECTOMY  10/17/2018    POLYPECTOMY      RECTAL SURGERY      fistula repair    TONSILLECTOMY AND ADENOIDECTOMY       Family History   Problem Relation Age of Onset    Lung Cancer Mother     Brain Cancer Mother     Heart Attack Father     Prostate Cancer Father     Heart Disease Father     Stroke Father     Uterine Cancer Maternal Grandmother     Cervical Cancer Maternal Grandmother     Diabetes Maternal Grandfather     Other Maternal Grandfather         histoplasmosis    Diabetes Paternal Grandfather      Social History     Tobacco Use    Smoking status: Never Smoker    Smokeless tobacco: Never Used   Substance Use Topics    Alcohol use: No       ROS  Eyes - no sudden vision change or amaurosis. Respiratory - no significant shortness of breath,  Cardiovascular - no chest pain or syncope. No  significant leg swelling. No claudication. Musculoskeletal - no gait disturbance; has back pain  Skin - no new wound. Neurologic -  No speech difficulty or lateralizing weakness. All other review of systems are negative. Physical Exam    Due to this being a TeleHealth encounter, evaluation of the following organ systems is limited: Vitals/Constitutional/EENT/Resp/CV/GI//MS/Neuro/Skin/Heme-Lymph-Imm. Constitutional - well developed, well nourished. No diaphoresis or acute distress. Neck- ROM appears normal  Extremities -No cyanosis, clubbing, no edema. No signs atheroembolic event. Pulmonary - effort appears normal.  No respiratory distress. No accessory muscle use  Neurologic - alert and oriented X 3. Cranial Nerves II-XII grossly intact  Skin - intact. No rash, erythema, or pallor. Psychiatric - mood, affect, and behavior appear normal.  Judgment and thought processes appear normal.    Risk factors for atherosclerosis of all vascular beds have been reviewed with the patient including:  Family history, tobacco abuse in all forms, elevated cholesterol, hyperlipidemia, and diabetes. Reviewed on this visit: speciality care notes from nephrology    Options have been discussed with the patient including continued medical management vs. proceeding with av fistula. Patient has opted to proceed with av access. Risks have been discussed with the patient including but not limited to MI, death, CVA, bleed, nerve injury, steal syndrome, and infection. ASSESSMENT/PLAN:  1. Chronic kidney disease (CKD), stage IV (severe) ([facility] [address]    1.  Chronic kidney disease (CKD), stage IV (severe) (HCC)     chronic illness with severe exacerbation/progression    Proceed with av access - left brachial cephalic av fistula

## 2021-05-28 NOTE — PROGRESS NOTES
"Chief Complaint  Foreign Body in Skin (R palm of hand)    Subjective    History of Present Illness      Patient presents to [facility] for   Pt c/o a splinter to the palm of her R hand x 1 week. Pt states that the area is red. Itches and hurts.    Foreign Body in Skin  This is a new problem. Episode onset: 1 week ago. She has tried nothing for the symptoms.        Review of Systems   Constitutional: Negative.    HENT: Negative.    Eyes: Negative.    Respiratory: Negative.    Cardiovascular: Negative.    Gastrointestinal: Negative.    Endocrine: Negative.    Genitourinary: Negative.    Musculoskeletal: Negative.    Skin: Negative.    Allergic/Immunologic: Negative.    Neurological: Negative.    Hematological: Negative.    Psychiatric/Behavioral: Negative.        I have reviewed and agree with the HPI and ROS information as above.  Janet Richardson, APRN     Objective   Vital Signs:   /79   Pulse 68   Temp 97.9 °F (36.6 °C)   Resp 20   Ht 166.4 cm (65.5\")   Wt (!) 174 kg (383 lb)   BMI 62.77 kg/m²       Physical Exam  Vitals and nursing note reviewed.   Constitutional:       Appearance: Normal appearance. She is well-developed. She is obese.   HENT:      Head: Normocephalic and atraumatic.      Right Ear: Tympanic membrane, ear canal and external ear normal.      Left Ear: Tympanic membrane, ear canal and external ear normal.      Nose: Nose normal. No septal deviation, nasal tenderness or congestion.      Mouth/Throat:      Lips: Pink. No lesions.      Mouth: Mucous membranes are moist. No oral lesions.      Dentition: Normal dentition.      Pharynx: Oropharynx is clear. No pharyngeal swelling, oropharyngeal exudate or posterior oropharyngeal erythema.   Eyes:      General: Lids are normal. Vision grossly intact. No scleral icterus.        Right eye: No discharge.         Left eye: No discharge.      Extraocular Movements: Extraocular movements intact.      " Conjunctiva/sclera: Conjunctivae normal.      Right eye: Right conjunctiva is not injected.      Left eye: Left conjunctiva is not injected.      Pupils: Pupils are equal, round, and reactive to light.   Neck:      Thyroid: No thyroid mass.      Trachea: Trachea normal.   Cardiovascular:      Rate and Rhythm: Normal rate and regular rhythm.      Heart sounds: Normal heart sounds. No murmur heard.   No gallop.    Pulmonary:      Effort: Pulmonary effort is normal.      Breath sounds: Normal breath sounds and air entry. No wheezing, rhonchi or rales.   Musculoskeletal:         General: No tenderness or deformity. Normal range of motion.      Cervical back: Full passive range of motion without pain, normal range of motion and neck supple.      Thoracic back: Normal.      Right lower leg: No edema.      Left lower leg: No edema.   Skin:     General: Skin is warm and dry.      Coloration: Skin is not jaundiced.      Findings: No rash.          Neurological:      Mental Status: She is alert and oriented to person, place, and time.      Cranial Nerves: Cranial nerves are intact.      Sensory: Sensation is intact.      Motor: Motor function is intact.      Coordination: Coordination is intact.      Gait: Gait is intact.      Deep Tendon Reflexes: Reflexes are normal and symmetric.   Psychiatric:         Mood and Affect: Mood and affect normal.         Behavior: Behavior normal.         Judgment: Judgment normal.        Foreign Body Removal    Date/Time: 5/28/2021 11:15 AM  Performed by: Janet Richardson APRN  Authorized by: Janet Richardson APRN   Intake: right hand.    Sedation:  Patient sedated: no    Patient restrained: no  Complexity: simple  1 objects recovered.  Objects recovered: small wooden splinter  Post-procedure assessment: foreign body removed  Patient tolerance: patient tolerated the procedure well with no immediate complications  Comments: Ointment and Band-Aid applied          Assessment and Plan       Problem List Items Addressed This Visit     None      Visit Diagnoses     Splinter of right hand    -  Primary      Small splinter noted to the right palm of the patients hand. With the use of a needle and tweezers, I was able to remove the splinter. It does not appear there is anything else in skin. Ointment applied, band aid applied. Patient states that it feels much better.         Follow Up   Return if symptoms worsen or fail to improve.  Patient was given instructions and counseling regarding her condition or for health maintenance advice. Please see specific information pulled into the AVS if appropriate.

## 2021-05-29 RX ORDER — PEN NEEDLE, DIABETIC 29 G X1/2"
NEEDLE, DISPOSABLE MISCELLANEOUS
Qty: 200 EACH | Refills: 1 | Status: SHIPPED | OUTPATIENT
Start: 2021-05-29

## 2021-06-01 ENCOUNTER — TELEPHONE (OUTPATIENT)
Dept: VASCULAR SURGERY | Age: 61
End: 2021-06-01

## 2021-06-01 NOTE — TELEPHONE ENCOUNTER
----- Message from JAMES Chapman sent at 5/28/2021  7:05 AM CDT -----  Please let her know she is a candidate for left brachial cephalic av fistula and schedule

## 2021-06-02 ENCOUNTER — TELEPHONE (OUTPATIENT)
Dept: VASCULAR SURGERY | Age: 61
End: 2021-06-02

## 2021-06-02 DIAGNOSIS — Z01.818 PRE-OP TESTING: Primary | ICD-10-CM

## 2021-06-02 NOTE — TELEPHONE ENCOUNTER
Patient has been notified via phone of instructions. Patient verbally understood instructions. Patient will call with questions, instructions have been mailed to patient per patient's request.Instructions given are as follows:      Olive Estevez  1960     Surgery Directions  Left AVF Creation     1. Report to the outpatient registration at [facility] on 21, at 6:00am for your procedure with Dr. Mihai Florentino. Time is subject to change, you will receive a call from our surgery department the day prior around 2:00pm to confirm your arrival time. 2. Nothing to eat or drink after midnight the night before the procedure. 3. Please take all medications as normally scheduled to take, including heart and blood pressure medicines with a sip of water. 4. Continue Aspirin medication. 5. Do not take Bumex, Lasix, insulin, or any diabetic medicine the morning of the procedure. If you take insulin, you may only take 1/2 of any scheduled nightly dose, and none the morning of the procedure. You may take all regularly scheduled heart, cholesterol, and blood pressure medicines with a sip of water. 6. If you take Glucophage, Metformin, Actos Plus Met, or Glucovance,you can not take this the day of your procedure and two days after the procedure. 7. Hold Plavix/Coumadin for 0 days prior to surgery. 8. If you have sleep apnea and require C-PAP, please bring this with you to the hospital.  9. Bring a list of all of your allergies and medications with you to the hospital.  10. Please let our nurse know if you have had an allergy to iodine, shellfish, or x-ray dye. 6. Let the nurse know if you take any of the followin. Over the counter herbal supplements  2. Diclofenec, indomethacin, ketoprofen, Caridopa/levadopa, naproxen, sulindac, piroxicam, glucosamine, Chondrotin, cocchine, or methotrexate. 12. Plan to stay at the hospital for 4 - 6 hours before being released  by the physician.  You will need someone Do not apply any salve, cream, peroxide or alcohol to the incision. Call with any increasing redness or drainage.                 PLEASE NOTE:  If the patient is unable to sign his/her own paperwork, the appointed caregiver (POA, child, sibling, etc) must be present at the time of registration for all testing and procedures.     Transportation to and from all testing and procedure appointments is the sole responsibility of the patient, caregiver, and/or nursing facility in which they reside. Please remember you will not be able to drive after you are discharged.     Please call the office at [phone] with any questions or concerns. Please allow 48-72 hours notice for cancellations or rescheduling.  We will attempt to accommodate your needs to the best of our capabilities, however, strict policies with procedure room availability does not allow much flexibility.

## 2021-06-04 DIAGNOSIS — Z01.818 PRE-OP TESTING: Primary | ICD-10-CM

## 2021-06-09 DIAGNOSIS — Z01.818 PRE-OP TESTING: ICD-10-CM

## 2021-06-16 ENCOUNTER — OFFICE VISIT (OUTPATIENT)
Dept: ONCOLOGY | Facility: CLINIC | Age: 61
End: 2021-06-16

## 2021-06-16 ENCOUNTER — TRANSCRIBE ORDERS (OUTPATIENT)
Dept: LAB | Facility: HOSPITAL | Age: 61
End: 2021-06-16

## 2021-06-16 ENCOUNTER — HOSPITAL ENCOUNTER (OUTPATIENT)
Dept: GENERAL RADIOLOGY | Age: 61
Discharge: HOME | End: 2021-06-16
Payer: MEDICARE

## 2021-06-16 ENCOUNTER — APPOINTMENT (OUTPATIENT)
Dept: LAB | Facility: HOSPITAL | Age: 61
End: 2021-06-16

## 2021-06-16 ENCOUNTER — HOSPITAL ENCOUNTER (OUTPATIENT)
Dept: PREADMISSION TESTING | Age: 61
Discharge: HOME | End: 2021-06-20
Payer: MEDICARE

## 2021-06-16 VITALS
WEIGHT: 293 LBS | SYSTOLIC BLOOD PRESSURE: 136 MMHG | RESPIRATION RATE: 16 BRPM | OXYGEN SATURATION: 98 % | HEIGHT: 65 IN | BODY MASS INDEX: 48.82 KG/M2 | DIASTOLIC BLOOD PRESSURE: 68 MMHG | TEMPERATURE: 97.7 F | HEART RATE: 68 BPM

## 2021-06-16 VITALS — HEIGHT: 66 IN | WEIGHT: 293 LBS | BODY MASS INDEX: 47.09 KG/M2

## 2021-06-16 DIAGNOSIS — E66.9 OBESITY, UNSPECIFIED CLASSIFICATION, UNSPECIFIED OBESITY TYPE, UNSPECIFIED WHETHER SERIOUS COMORBIDITY PRESENT: ICD-10-CM

## 2021-06-16 DIAGNOSIS — D75.89 BONE MARROW SUPPRESSION: Primary | ICD-10-CM

## 2021-06-16 DIAGNOSIS — Z01.818 PRE-OP TESTING: ICD-10-CM

## 2021-06-16 DIAGNOSIS — R79.1 ABNORMAL COAGULATION PROFILE: ICD-10-CM

## 2021-06-16 DIAGNOSIS — Z01.818 PREOPERATIVE TESTING: Primary | ICD-10-CM

## 2021-06-16 DIAGNOSIS — D64.9 ANEMIA, UNSPECIFIED TYPE: ICD-10-CM

## 2021-06-16 LAB
ANION GAP SERPL CALCULATED.3IONS-SCNC: 11 MMOL/L (ref 7–19)
APTT: 28.7 SEC (ref 26–36.2)
BUN BLDV-MCNC: 34 MG/DL (ref 8–23)
CALCIUM SERPL-MCNC: 8.6 MG/DL (ref 8.8–10.2)
CHLORIDE BLD-SCNC: 108 MMOL/L (ref 98–111)
CO2: 21 MMOL/L (ref 22–29)
CREAT SERPL-MCNC: 3.2 MG/DL (ref 0.5–0.9)
EKG P AXIS: 24 DEGREES
EKG P-R INTERVAL: 198 MS
EKG Q-T INTERVAL: 490 MS
EKG QRS DURATION: 94 MS
EKG QTC CALCULATION (BAZETT): 485 MS
EKG T AXIS: 49 DEGREES
GFR AFRICAN AMERICAN: 18
GFR NON-AFRICAN AMERICAN: 15
GLUCOSE BLD-MCNC: 81 MG/DL (ref 74–109)
HCT VFR BLD CALC: 32.3 % (ref 37–47)
HEMOGLOBIN: 10.2 G/DL (ref 12–16)
INR BLD: 1.04 (ref 0.88–1.18)
MCH RBC QN AUTO: 29.8 PG (ref 27–31)
MCHC RBC AUTO-ENTMCNC: 31.6 G/DL (ref 33–37)
MCV RBC AUTO: 94.4 FL (ref 81–99)
MRSA SCREEN RT-PCR: NOT DETECTED
PDW BLD-RTO: 15.3 % (ref 11.5–14.5)
PLATELET # BLD: 203 K/UL (ref 130–400)
PMV BLD AUTO: 10.6 FL (ref 9.4–12.3)
POTASSIUM SERPL-SCNC: 4 MMOL/L (ref 3.5–5)
PROTHROMBIN TIME: 13.5 SEC (ref 12–14.6)
RBC # BLD: 3.42 M/UL (ref 4.2–5.4)
SODIUM BLD-SCNC: 140 MMOL/L (ref 136–145)
WBC # BLD: 8.4 K/UL (ref 4.8–10.8)

## 2021-06-16 PROCEDURE — 85027 COMPLETE CBC AUTOMATED: CPT

## 2021-06-16 PROCEDURE — 85730 THROMBOPLASTIN TIME PARTIAL: CPT

## 2021-06-16 PROCEDURE — 71046 X-RAY EXAM CHEST 2 VIEWS: CPT

## 2021-06-16 PROCEDURE — 85610 PROTHROMBIN TIME: CPT

## 2021-06-16 PROCEDURE — 93005 ELECTROCARDIOGRAM TRACING: CPT

## 2021-06-16 PROCEDURE — 99214 OFFICE O/P EST MOD 30 MIN: CPT | Performed by: INTERNAL MEDICINE

## 2021-06-16 PROCEDURE — 80048 BASIC METABOLIC PNL TOTAL CA: CPT

## 2021-06-16 PROCEDURE — 87641 MR-STAPH DNA AMP PROBE: CPT

## 2021-06-16 RX ORDER — ERGOCALCIFEROL 1.25 MG/1
50000 CAPSULE ORAL WEEKLY
COMMUNITY
End: 2021-08-19 | Stop reason: SDUPTHER

## 2021-06-16 RX ORDER — LISINOPRIL 40 MG/1
40 TABLET ORAL 2 TIMES DAILY
COMMUNITY
End: 2021-09-01 | Stop reason: ALTCHOICE

## 2021-06-16 RX ORDER — DEXAMETHASONE 1 MG
1 TABLET ORAL DAILY
Qty: 14 TABLET | Refills: 2 | Status: SHIPPED | OUTPATIENT
Start: 2021-06-16 | End: 2021-06-30

## 2021-06-16 RX ORDER — LEVOTHYROXINE SODIUM 0.07 MG/1
75 TABLET ORAL DAILY
COMMUNITY
End: 2021-07-12

## 2021-06-16 NOTE — H&P (VIEW-ONLY)
[facility] HEMATOLOGY AND ONCOLOGY  [address]  [place] [address] [phone]    Chief Complaint  Anemia (Here for f/u)    Subjective      REASON FOR VISIT: Maria C Shrestha is a 60-year-old female here for follow-up of anemia due to chronic kidney disease stage IV.  She sees nephrologist Dr. Davis.  She has been receiving Procrit when she meets guidelines.  Her last Procrit injection was on 12/21/2020.  She has also had iron infusions in the past with the last infusions of Injectafer being on 7/17/2020 and 7/31/2020.     At last appointment patient had stated that Dr. Davis told her in December that he was going to get her ready for dialysis.  Today she states she has an appointment with Dr. Davis on April 7, and at that time they will discuss possible dialysis.     Patient states she is still having the same symptoms as she was experiencing at her last office visit which is extreme fatigue, generalized weakness, sinus congestion and allergies.  No sinus pressure upon physical examination.  Patient states she is taking Zyrtec and using Flonase twice daily.  She states her throat feels swollen in the back especially in the morning after using CPAP all night.  She states that the reservoir is dry by morning.  Patient has not seen her primary care physician Dr. Soliman regarding this issue.  Encouraged patient to make appointment to be seen by Dr. Soliman.  At last appointment diabetes management was discussed with patient.  She states she has been trying to make small changes in her diet since her last visit.  Still dealing with depression and problems managing her diabetes.  Patient has lost 6 pounds since last office visit which she states that she believes is fluid loss.  She does have lower extremity edema that is nonpitting.  She is still using a wheelchair to move around as she is not able to walk more than a few feet.  She denies  "abdominal pain, fever, chills, night sweats, diarrhea, constipation, nausea/vomiting.     Labs today include hemoglobin 10.3, hematocrit 32.3, GFR 19, iron saturation 22, ferritin 466.10, and platelets 212,000.  Patient meets guidelines to receive Procrit today.     On follow up 3/17/2021: Patient last received Procrit on 2/17/2021. Patient is seeing ENT next month for throat issues. States she has gained 10 pounds since last visit, and is holding a lot fluid.  Lung sounds are clear, normal heart sounds heard, and she does have 1+ pitting edema in her lower extremities bilaterally.  She is scheduled to see nephrologist Dr. Davis next week.  She states she still has fatigue specially upon walking short distances, and still has shortness of breath with exertion.  Denies chills, fever, nausea/vomiting.     Today's labs shared with patient in office.  Hemoglobin 9.8, hematocrit 29.8, platelets 186,000, blood glucose 207, iron saturation 16, ferritin 322.40.  As patient's iron saturation is less than 20 patient is not eligible for Procrit today.  Patient is to receive 1 dose of Injectafer 750 mg today.  Patient has had Injectafer in the past as she was unable to tolerate oral iron pills.  Will reassess labs in 1 month with possible Procrit injection.        On follow up 4/14/2021: Patient presents feeling \"okay, but tired\". She complains of bilateral edema in lower extremities and fatigue. She denies fever, chills, nausea, vomiting, weight loss.   She states she has been fluctuating 10 pounds due to fluid build up. Left leg swells larger than right.  Edema decreases after elevating legs.  Recently met with nephrologist Dr. Davis, and he states they are still working towards dialysis. Discussed with patient to decrease salt intake including canned foods.  Patient encouraged to continue drinking water and continue decreasing soda intake.  Continue elevating legs.  Encouraged patient to discuss with her pharmacy her " "options of possibly special ordering compression stockings to help with her lower extremity edema.  Pitting edema present bilaterally in lower extremities.     Her labs today do meet the guidelines to receive Procrit.  Labs shared with patient in office.  Her hemoglobin has increased to 10, hematocrit 30.2, GFR decreased to 15, iron saturation 21, ferritin high at 538.70 (patient had Injectafer infusion 1 month ago).               2/17/21    Procrit 40,000 units   04/14/2021 Procrit 40,000 units                        3/17/21 Injectafer 750mg                        Follow-up on 5/12/2021:     Patient presents today feeling fatigued. Patient complains \"regular aches and pains\" and shortness of breath on exertion. Patient denies  fever, chills, unexpected weight loss, nausea, vomiting, chest pain. Occasion leg swelling.     She has appointment on 5/19/2021 with Vascular Surgeon Dr. Byers at [facility] to map out veins for port placement for future dialysis.     Today's labs discussed with patient in office. Hemoglobin 10.8, hematocrit 33.3, GFR 14.  Patient does not meet guidelines for Procrit today.     Discussed with patient her blood pressure is elevated today. To start checking blood pressure at home, and follow up with PCP.     Patient is to follow up in one month with Dr. Stanley.      Oncology/Hematology History    No history exists.         PAST MEDICAL HISTORY:  ALLERGIES:  Allergies   Allergen Reactions   • Codeine Nausea Only     CURRENT MEDICATIONS:  Outpatient Encounter Medications as of 6/16/2021   Medication Sig Dispense Refill   • allopurinol (ZYLOPRIM) 100 MG tablet Take 100 mg by mouth 2 (Two) Times a Day.     • amLODIPine (NORVASC) 10 MG tablet Take 10 mg by mouth.     • aspirin 81 MG tablet Take 81 mg by mouth Daily. Stop 7/22/2018     • azelastine (ASTELIN) 0.1 % nasal spray 2 sprays into the nostril(s) as directed by provider 2 (Two) Times a Day. Use in each nostril as directed 30 mL 11 " "  • BD Insulin Syringe U/F 31G X 5/16\" 1 ML misc AS DIRECTED FOUR TIMES A  each 1   • busPIRone (BUSPAR) 10 MG tablet TAKE 2 TABLETS TWICE A DAY AS NEEDED 360 tablet 3   • carvedilol (COREG) 12.5 MG tablet Take 12.5 mg by mouth 2 (Two) Times a Day With Meals.     • cetirizine (zyrTEC) 10 MG tablet Take 10 mg by mouth Daily.     • Cholecalciferol (VITAMIN D3) 64829 units tablet Take 1 capsule by mouth 2 (Two) Times a Week.     • citalopram (CeleXA) 40 MG tablet TAKE 1 TABLET DAILY 90 tablet 3   • diazePAM (VALIUM) 2 MG tablet Take 2 mg by mouth As Needed for Anxiety.     • Dulaglutide (Trulicity) 3 MG/0.5ML solution pen-injector Inject 3 mg under the skin into the appropriate area as directed 1 (One) Time Per Week. 12 pen 3   • epoetin elsa (EPOGEN,PROCRIT) 23089 UNIT/ML injection Inject 10,000 Units under the skin into the appropriate area as directed Every 30 (Thirty) Days.     • Evolocumab (REPATHA) solution auto-injector SureClick injection Inject 140 mg under the skin into the appropriate area as directed Every 14 (Fourteen) Days.     • Evolocumab 140 MG/ML solution auto-injector Inject 1 mL under the skin into the appropriate area as directed Every 14 (Fourteen) Days for 24 doses. NDC [address]-29 6 mL 10   • fluticasone (FLONASE) 50 MCG/ACT nasal spray 1 spray into the nostril(s) as directed by provider 2 (Two) Times a Day.     • Glucose Blood (BLOOD GLUCOSE TEST) strip Use 4 x daily, use any brand covered by insurance or same brand as before 360 each 3   • insulin glargine (Lantus) 100 UNIT/ML injection INJECT 200 UNITS DAILY (Patient taking differently: Inject 90 Units under the skin into the appropriate area as directed Daily. INJECT 200 UNITS DAILY) 60 mL 11   • Insulin Lispro (HumaLOG KwikPen) 200 UNIT/ML solution pen-injector Inject 80 Units under the skin into the appropriate area as directed 3 (Three) Times a Day With Meals. 24 pen 11   • Insulin Pen Needle (B-D ULTRAFINE III SHORT PEN) 31G X 8 " MM misc USE AS DIRECTED TO INJECT FOUR TIMES DAILY 200 each 2   • lamoTRIgine (LaMICtal) 100 MG tablet TAKE ONE-HALF (1/2) TABLET DAILY 45 tablet 3   • Lancets (freestyle) lancets Use as instructed 4 x daily 150 each 11   • levothyroxine (SYNTHROID, LEVOTHROID) 88 MCG tablet Take 1 tablet by mouth Daily. 90 tablet 3   • lisinopril (PRINIVIL,ZESTRIL) 40 MG tablet Take 40 mg by mouth 2 (Two) Times a Day.     • Multiple Vitamins-Minerals (MULTIVITAMIN ADULT PO) Take 1 tablet by mouth Daily.     • mupirocin (BACTROBAN) 2 % ointment Apply  topically to the appropriate area as directed 3 (Three) Times a Day. (Patient taking differently: Apply 11 application topically to the appropriate area as directed As Needed.) 22 g 0   • mupirocin (BACTROBAN) 2 % ointment Apply the Bactroban Ointment to the inside of your nose on both sides twice a day for 5 days starting on 6/13/21.     • Omega-3 Fatty Acids (FISH OIL) 1000 MG capsule capsule Take 2,000 mg by mouth Daily With Dinner.     • potassium gluconate 595 (99 K) MG tablet tablet potassium gluconate 595 mg (99 mg) tablet   Take 1 tablets every day by oral route.     • rosuvastatin (CRESTOR) 20 MG tablet      • sodium bicarbonate 650 MG tablet Take 1,300 mg by mouth 3 (Three) Times a Day.     • vitamin D (ERGOCALCIFEROL) 1.25 MG (09763 UT) capsule capsule Take 50,000 Units by mouth 1 (One) Time Per Week. 3x weekly     • dexamethasone (DECADRON) 1 MG tablet Take 1 tablet by mouth Daily for 14 days. 14 tablet 2     No facility-administered encounter medications on file as of 6/16/2021.     ADULT ILLNESSES:  Patient Active Problem List   Diagnosis Code   • Type 2 diabetes mellitus with stage 3 chronic kidney disease, with long-term current use of insulin (CMS/[facility]) E11.22, N18.30, Z79.4   • Mixed diabetic hyperlipidemia associated with type 2 diabetes mellitus (CMS/[facility]) E11.69, E78.2   • Hypertension associated with diabetes (CMS/[facility]) E11.59, I15.2   • Vitamin D deficiency E55.9    • B12 deficiency E53.8   • Anemia in CKD (chronic kidney disease) N18.9, D63.1   • Acquired hypothyroidism E03.9   • Chronic kidney disease, stage 4 (severe) (CMS/HCC) N18.4   • Anemia D64.9   • Anxiety F41.9   • Deep venous thrombosis of lower extremity (CMS/HCC) I82.409   • Disease of liver K76.9   • Embolism (CMS/HCC) I74.9   • Hypertriglyceridemia E78.1   • Hypocalcemia E83.51   • Mood disorder (CMS/HCC) F39   • Obesity E66.9   • Sleep apnea G47.30   • Acute renal failure superimposed on stage 3 chronic kidney disease (CMS/HCC) N17.9, N18.30   • Anemia of chronic renal failure N18.9, D63.1   • Hypertension I10   • Diabetes mellitus (CMS/HCC) E11.9   • Thyroid disease E07.9   • Hypothyroidism E03.9   • Vitamin D deficiency E55.9   • Anemia due to stage 4 chronic kidney disease (CMS/HCC) N18.4, D63.1   • Stage 4 chronic kidney disease (CMS/HCC) N18.4   • Allergic rhinitis with postnasal drip J30.9, R09.82   • Bone marrow suppression D75.89   • Abnormal coagulation profile  R79.1     SURGERIES:  Past Surgical History:   Procedure Laterality Date   • ADENOIDECTOMY     • ANAL FISTULA REPAIR      x 2    • CHOLECYSTECTOMY     • COLONOSCOPY  01/02/2014   • COLONOSCOPY N/A 3/22/2017    Procedure: COLONOSCOPY WITH ANESTHESIA;  Surgeon: Mono Linder MD;  Location: [facility] ENDOSCOPY;  Service:    • D & C HYSTEROSCOPY N/A 7/25/2018    Procedure: DILATATION AND CURETTAGE HYSTEROSCOPY;  Surgeon: Michael Castaneda MD;  Location: [facility] OR;  Service: Obstetrics/Gynecology   • DILATATION AND CURETTAGE      X 2   • ENDOSCOPY     • HYSTERECTOMY     • TONSILLECTOMY       HEALTH MAINTENANCE ITEMS:  Health Maintenance Due   Topic Date Due   • Pneumococcal Vaccine 0-64 (1 of 3 - PCV13) [birth year]/1966   • TDAP/TD VACCINES (1 - Tdap) Never done   • ZOSTER VACCINE (1 of 2) Never done   • HEPATITIS C SCREENING  Never done   • ANNUAL WELLNESS VISIT  Never done   • DIABETIC FOOT EXAM  Never done   • PAP SMEAR  Never done   • DXA SCAN   11/14/2020       <no information>  Last Completed Colonoscopy     This patient has no relevant Health Maintenance data.        Immunization History   Administered Date(s) Administered   • COVID-19 (CAMILA) 03/09/2021   • Flu Mist 11/06/2014   • Pneumococcal, Unspecified 01/01/2015     Last Completed Mammogram          MAMMOGRAM (Every 2 Years) Next due on 8/12/2022 08/12/2020  Mammo Screening Digital Tomosynthesis Bilateral With CAD    05/16/2018  Mammo Screening Digital Tomosynthesis Bilateral With CAD    01/20/2015  MAMMOGRAPHY SCREENING BILATERAL                  FAMILY HISTORY:  Family History   Problem Relation Age of Onset   • Diabetes Other    • Heart failure Other    • Cancer Other    • Kidney disease Other    • Cancer Mother    • Cancer Father    • Heart disease Father    • Diabetes Father    • Obesity Father    • Stroke Father    • Cancer Maternal Grandmother    • Uterine cancer Maternal Grandmother    • Diabetes Maternal Grandfather    • Cancer Paternal Grandmother    • Colon cancer Paternal Grandmother    • Diabetes Paternal Grandfather    • Heart disease Paternal Grandfather    • No Known Problems Sister    • No Known Problems Brother    • No Known Problems Daughter    • No Known Problems Maternal Aunt    • No Known Problems Paternal Aunt    • BRCA 1/2 Neg Hx    • Breast cancer Neg Hx    • Endometrial cancer Neg Hx    • Ovarian cancer Neg Hx      SOCIAL HISTORY:  Social History     Socioeconomic History   • Marital status:      Spouse name: Not on file   • Number of children: Not on file   • Years of education: Not on file   • Highest education level: Not on file   Tobacco Use   • Smoking status: Never Smoker   • Smokeless tobacco: Never Used   Vaping Use   • Vaping Use: Never used   Substance and Sexual Activity   • Alcohol use: No   • Drug use: No   • Sexual activity: Never     Birth control/protection: Post-menopausal       REVIEW OF SYSTEMS:  Review of Systems   Constitutional: Positive  "for activity change, fatigue and unexpected weight gain.   HENT: Negative.    Eyes: Negative.    Respiratory: Negative.    Cardiovascular: Negative.    Gastrointestinal: Negative.    Endocrine: Negative.    Genitourinary: Negative.    Musculoskeletal: Positive for arthralgias, back pain, gait problem, joint swelling, myalgias and neck pain.   Allergic/Immunologic: Negative.    Neurological: Positive for dizziness, weakness and numbness.   Hematological: Negative.    Psychiatric/Behavioral: Positive for depressed mood and stress.       /68   Pulse 68   Temp 97.7 °F (36.5 °C) (Temporal)   Resp 16   Ht 165.1 cm (65\")   Wt (!) 176 kg (388 lb 3.2 oz)   LMP  (LMP Unknown)   SpO2 98%   Breastfeeding No   BMI 64.60 kg/m²  Body surface area is 2.62 meters squared.  Pain Score    06/16/21 1307   PainSc:   2   PainLoc: Leg       Objective   Vital Signs:   /68   Pulse 68   Temp 97.7 °F (36.5 °C) (Temporal)   Resp 16   Ht 165.1 cm (65\")   Wt (!) 176 kg (388 lb 3.2 oz)   LMP  (LMP Unknown)   SpO2 98%   Breastfeeding No   BMI 64.60 kg/m²  Body surface area is 2.62 meters squared.  Pain Score    06/16/21 1307   PainSc:   2   PainLoc: Leg     Maria C Shrestha reports a pain score of 2.  Given her pain assessment as noted, treatment options were discussed and the following options were decided upon as a follow-up plan to address the patient's pain: .      Patient's Body mass index is 64.6 kg/m². indicating that she is .      Physical Exam  Constitutional:       General: She is in acute distress.      Appearance: She is obese.   HENT:      Head: Atraumatic.      Mouth/Throat:      Mouth: Mucous membranes are dry.   Eyes:      Extraocular Movements: Extraocular movements intact.   Cardiovascular:      Rate and Rhythm: Normal rate.   Pulmonary:      Effort: Pulmonary effort is normal.   Abdominal:      Palpations: Abdomen is soft.   Musculoskeletal:         General: Normal range of motion.      Cervical back: " Normal range of motion.   Skin:     General: Skin is warm and dry.   Neurological:      General: No focal deficit present.      Mental Status: She is oriented to person, place, and time.   Psychiatric:         Mood and Affect: Mood normal.         Behavior: Behavior normal.            Result Review :     LABS    Lab Results - Last 18 Months   Lab Units 06/16/21  0955 05/12/21  1244 04/14/21  1340 03/17/21  1301 03/17/21  0904 02/17/21  1253 01/18/21  1321 12/21/20  1400   HEMOGLOBIN g/dL 10.2* 10.8* 10.0* 9.8* 9.9* 10.3* 11.1* 10.5*   HEMATOCRIT % 32.3* 33.3* 30.2* 29.8* 30.6* 32.3* 34.2 31.9*   MCV fL 94.4 92.0 91.0 92.3 91.6 92.8 92.4 93.0   WBC K/uL 8.4 8.29 9.35 9.36 8.60 9.87 9.96 10.46   RDW % 15.3* 15.2 14.8 15.3 14.8 15.2 15.2 15.4   MPV fL 10.6 9.9 10.0 10.2 8.9 9.8 10.4 10.5   PLATELETS K/uL 203 219 209 186 220 212 217 193   IMM GRAN % %  --  0.6* 0.6* 0.4  --  0.6* 0.8* 0.6*   NEUTROS ABS 10*3/mm3  --  6.49 6.67 6.86 6.30 7.04* 7.28* 7.75*   LYMPHS ABS 10*3/mm3  --  1.03 1.82 1.72 1.90 1.92 1.84 1.88   MONOS ABS 10*3/mm3  --  0.44 0.56 0.44  --  0.54 0.47 0.49   EOS ABS 10*3/mm3  --  0.24 0.20 0.26  --  0.27 0.24 0.25   BASOS ABS 10*3/mm3  --  0.04 0.04 0.04  --  0.04 0.05 0.03   IMMATURE GRANS (ABS) 10*3/mm3  --  0.05 0.06* 0.04  --  0.06* 0.08* 0.06*   NRBC /100 WBC  --  0.0 0.0 0.0  --  0.0 0.0 0.0       Lab Results - Last 18 Months   Lab Units 05/12/21  1244 04/14/21  1340 03/17/21  1301 03/17/21  0904 02/17/21  1253 01/18/21  1321   GLUCOSE mg/dL 193* 126* 207* 145* 135* 280*   SODIUM mmol/L 140 143 141 138 141 139   POTASSIUM mmol/L 4.2 3.4* 4.0 3.8 4.0 4.4   CO2 mmol/L 19.0* 24.0 22.0 22.0* 26.0 23.0   CHLORIDE mmol/L 110* 106 107 108 108* 106   ANION GAP mmol/L 11.0 13.0 12.0 8.0 7.0 10.0   CREATININE mg/dL 3.28* 3.21* 2.75* 2.74* 2.55* 2.84*   BUN mg/dL 42* 28* 34* 33* 32* 48*   BUN / CREAT RATIO  12.8 8.7 12.4 12.0 12.5 16.9   CALCIUM mg/dL 8.8 8.0* 8.5* 8.1*  8.5 8.9 9.5   EGFR IF NONAFRICN AM  mL/min/1.73 14* 15* 18* 18* 19* 17*   ALK PHOS U/L 88 85 89 89 89 102   TOTAL PROTEIN g/dL 6.2 6.3 6.4 6.7 6.9 7.5   ALT (SGPT) U/L 7 7 9 10 9 6   AST (SGOT) U/L 10 11 14 18 10 10   BILIRUBIN mg/dL 0.2 0.2 <0.2 0.2 0.2 0.2   ALBUMIN g/dL 3.10* 3.10* 3.10* 3.30* 3.30* 3.60   GLOBULIN gm/dL 3.1 3.2 3.3 3.4 3.6 3.9       Lab Results - Last 18 Months   Lab Units 05/12/21  1244   REFERENCE LAB REPORT  See Attached Report       Lab Results - Last 18 Months   Lab Units 05/12/21  1244 04/14/21  1340 03/17/21  1301 03/17/21  0904 02/17/21  1253 10/14/20  1023 08/19/20  1255 07/15/20  1006 04/13/20  1038 01/08/20  0923 12/18/19  1137   IRON mcg/dL 54 55 42  --  66 90 83  --   --   --   --    TIBC mcg/dL 244* 268* 261*  --  297* 273* 256*  --   --   --   --    IRON SATURATION % 22 21 16*  --  22 33 32  --   --   --   --    FERRITIN ng/mL 462.30* 538.70* 322.40*  --  466.10* 405.70* 639.60*  --   --   --   --    TSH uIU/mL  --   --   --  4.940*  --  2.650  --  3.010 7.250* 3.590 3.640              Assessment and Plan    Problem List Items Addressed This Visit        Other    Anemia    Current Assessment & Plan     1. With the replacement of Fe her Hgb is only 10 ranges  2. She does have anemia of chronci DS or possible MDS         Relevant Orders    Ferritin    Obesity    Current Assessment & Plan     1. We have discussed about positive impact of significant weight reduction on her abnormal CBC         Relevant Orders    Ambulatory Referral to Bariatric Surgery    CBC & Differential    Comprehensive Metabolic Panel    Ferritin    Sedimentation Rate    Bone marrow suppression - Primary    Current Assessment & Plan     1. She clearly has abnormal BM activity and we have discussed about potential BM BX         Relevant Orders    CT Guided Bone Marrow Biopsy And Aspiration    CBC & Differential    Comprehensive Metabolic Panel    Ferritin    Sedimentation Rate    Abnormal coagulation profile     Relevant Orders    CBC &  Differential    Comprehensive Metabolic Panel    Ferritin    Sedimentation Rate        In summary    1. She clearly has anemia of chronic DS  2. Her response from EPOGEN has been very minimal  3. She will try low dose of dexamethasone  4. Her CBC is worrisome for immature granulocyte and other findings worrisome for MDS/MPD, she will have her BM BX done    Follow Up     Patient was given instructions and counseling regarding her condition or for health maintenance advice. Please see specific information pulled into the AVS if appropriate.

## 2021-06-16 NOTE — PROGRESS NOTES
[facility] GROUP HEMATOLOGY AND ONCOLOGY  [address]  [facility] [address] [phone]    Chief Complaint  Anemia (Here for f/u)    Subjective      REASON FOR VISIT: Maria C Shrestha is a 60-year-old female here for follow-up of anemia due to chronic kidney disease stage IV.  She sees nephrologist Dr. Davis.  She has been receiving Procrit when she meets guidelines.  Her last Procrit injection was on 12/21/2020.  She has also had iron infusions in the past with the last infusions of Injectafer being on 7/17/2020 and 7/31/2020.     At last appointment patient had stated that Dr. Davis told her in December that he was going to get her ready for dialysis.  Today she states she has an appointment with Dr. Davis on April 7, and at that time they will discuss possible dialysis.     Patient states she is still having the same symptoms as she was experiencing at her last office visit which is extreme fatigue, generalized weakness, sinus congestion and allergies.  No sinus pressure upon physical examination.  Patient states she is taking Zyrtec and using Flonase twice daily.  She states her throat feels swollen in the back especially in the morning after using CPAP all night.  She states that the reservoir is dry by morning.  Patient has not seen her primary care physician Dr. Soliman regarding this issue.  Encouraged patient to make appointment to be seen by Dr. Soliman.  At last appointment diabetes management was discussed with patient.  She states she has been trying to make small changes in her diet since her last visit.  Still dealing with depression and problems managing her diabetes.  Patient has lost 6 pounds since last office visit which she states that she believes is fluid loss.  She does have lower extremity edema that is nonpitting.  She is still using a wheelchair to move around as she is not able to walk more than a few feet.  She denies  "abdominal pain, fever, chills, night sweats, diarrhea, constipation, nausea/vomiting.     Labs today include hemoglobin 10.3, hematocrit 32.3, GFR 19, iron saturation 22, ferritin 466.10, and platelets 212,000.  Patient meets guidelines to receive Procrit today.     On follow up 3/17/2021: Patient last received Procrit on 2/17/2021. Patient is seeing ENT next month for throat issues. States she has gained 10 pounds since last visit, and is holding a lot fluid.  Lung sounds are clear, normal heart sounds heard, and she does have 1+ pitting edema in her lower extremities bilaterally.  She is scheduled to see nephrologist Dr. Davis next week.  She states she still has fatigue specially upon walking short distances, and still has shortness of breath with exertion.  Denies chills, fever, nausea/vomiting.     Today's labs shared with patient in office.  Hemoglobin 9.8, hematocrit 29.8, platelets 186,000, blood glucose 207, iron saturation 16, ferritin 322.40.  As patient's iron saturation is less than 20 patient is not eligible for Procrit today.  Patient is to receive 1 dose of Injectafer 750 mg today.  Patient has had Injectafer in the past as she was unable to tolerate oral iron pills.  Will reassess labs in 1 month with possible Procrit injection.        On follow up 4/14/2021: Patient presents feeling \"okay, but tired\". She complains of bilateral edema in lower extremities and fatigue. She denies fever, chills, nausea, vomiting, weight loss.   She states she has been fluctuating 10 pounds due to fluid build up. Left leg swells larger than right.  Edema decreases after elevating legs.  Recently met with nephrologist Dr. Davis, and he states they are still working towards dialysis. Discussed with patient to decrease salt intake including canned foods.  Patient encouraged to continue drinking water and continue decreasing soda intake.  Continue elevating legs.  Encouraged patient to discuss with her pharmacy her " "options of possibly special ordering compression stockings to help with her lower extremity edema.  Pitting edema present bilaterally in lower extremities.     Her labs today do meet the guidelines to receive Procrit.  Labs shared with patient in office.  Her hemoglobin has increased to 10, hematocrit 30.2, GFR decreased to 15, iron saturation 21, ferritin high at 538.70 (patient had Injectafer infusion 1 month ago).               2/17/21    Procrit 40,000 units   04/14/2021 Procrit 40,000 units                        3/17/21 Injectafer 750mg                        Follow-up on 5/12/2021:     Patient presents today feeling fatigued. Patient complains \"regular aches and pains\" and shortness of breath on exertion. Patient denies  fever, chills, unexpected weight loss, nausea, vomiting, chest pain. Occasion leg swelling.     She has appointment on 5/19/2021 with Vascular Surgeon Dr. Byers at [facility] to map out veins for port placement for future dialysis.     Today's labs discussed with patient in office. Hemoglobin 10.8, hematocrit 33.3, GFR 14.  Patient does not meet guidelines for Procrit today.     Discussed with patient her blood pressure is elevated today. To start checking blood pressure at home, and follow up with PCP.     Patient is to follow up in one month with Dr. Stanley.      Oncology/Hematology History    No history exists.         PAST MEDICAL HISTORY:  ALLERGIES:  Allergies   Allergen Reactions   • Codeine Nausea Only     CURRENT MEDICATIONS:  Outpatient Encounter Medications as of 6/16/2021   Medication Sig Dispense Refill   • allopurinol (ZYLOPRIM) 100 MG tablet Take 100 mg by mouth 2 (Two) Times a Day.     • amLODIPine (NORVASC) 10 MG tablet Take 10 mg by mouth.     • aspirin 81 MG tablet Take 81 mg by mouth Daily. Stop 7/22/2018     • azelastine (ASTELIN) 0.1 % nasal spray 2 sprays into the nostril(s) as directed by provider 2 (Two) Times a Day. Use in each nostril as directed 30 mL 11 " "  • BD Insulin Syringe U/F 31G X 5/16\" 1 ML misc AS DIRECTED FOUR TIMES A  each 1   • busPIRone (BUSPAR) 10 MG tablet TAKE 2 TABLETS TWICE A DAY AS NEEDED 360 tablet 3   • carvedilol (COREG) 12.5 MG tablet Take 12.5 mg by mouth 2 (Two) Times a Day With Meals.     • cetirizine (zyrTEC) 10 MG tablet Take 10 mg by mouth Daily.     • Cholecalciferol (VITAMIN D3) 48460 units tablet Take 1 capsule by mouth 2 (Two) Times a Week.     • citalopram (CeleXA) 40 MG tablet TAKE 1 TABLET DAILY 90 tablet 3   • diazePAM (VALIUM) 2 MG tablet Take 2 mg by mouth As Needed for Anxiety.     • Dulaglutide (Trulicity) 3 MG/0.5ML solution pen-injector Inject 3 mg under the skin into the appropriate area as directed 1 (One) Time Per Week. 12 pen 3   • epoetin elsa (EPOGEN,PROCRIT) 39015 UNIT/ML injection Inject 10,000 Units under the skin into the appropriate area as directed Every 30 (Thirty) Days.     • Evolocumab (REPATHA) solution auto-injector SureClick injection Inject 140 mg under the skin into the appropriate area as directed Every 14 (Fourteen) Days.     • Evolocumab 140 MG/ML solution auto-injector Inject 1 mL under the skin into the appropriate area as directed Every 14 (Fourteen) Days for 24 doses. NDC [address]-60 6 mL 10   • fluticasone (FLONASE) 50 MCG/ACT nasal spray 1 spray into the nostril(s) as directed by provider 2 (Two) Times a Day.     • Glucose Blood (BLOOD GLUCOSE TEST) strip Use 4 x daily, use any brand covered by insurance or same brand as before 360 each 3   • insulin glargine (Lantus) 100 UNIT/ML injection INJECT 200 UNITS DAILY (Patient taking differently: Inject 90 Units under the skin into the appropriate area as directed Daily. INJECT 200 UNITS DAILY) 60 mL 11   • Insulin Lispro (HumaLOG KwikPen) 200 UNIT/ML solution pen-injector Inject 80 Units under the skin into the appropriate area as directed 3 (Three) Times a Day With Meals. 24 pen 11   • Insulin Pen Needle (B-D ULTRAFINE III SHORT PEN) 31G X 8 " MM misc USE AS DIRECTED TO INJECT FOUR TIMES DAILY 200 each 2   • lamoTRIgine (LaMICtal) 100 MG tablet TAKE ONE-HALF (1/2) TABLET DAILY 45 tablet 3   • Lancets (freestyle) lancets Use as instructed 4 x daily 150 each 11   • levothyroxine (SYNTHROID, LEVOTHROID) 88 MCG tablet Take 1 tablet by mouth Daily. 90 tablet 3   • lisinopril (PRINIVIL,ZESTRIL) 40 MG tablet Take 40 mg by mouth 2 (Two) Times a Day.     • Multiple Vitamins-Minerals (MULTIVITAMIN ADULT PO) Take 1 tablet by mouth Daily.     • mupirocin (BACTROBAN) 2 % ointment Apply  topically to the appropriate area as directed 3 (Three) Times a Day. (Patient taking differently: Apply 11 application topically to the appropriate area as directed As Needed.) 22 g 0   • mupirocin (BACTROBAN) 2 % ointment Apply the Bactroban Ointment to the inside of your nose on both sides twice a day for 5 days starting on 6/13/21.     • Omega-3 Fatty Acids (FISH OIL) 1000 MG capsule capsule Take 2,000 mg by mouth Daily With Dinner.     • potassium gluconate 595 (99 K) MG tablet tablet potassium gluconate 595 mg (99 mg) tablet   Take 1 tablets every day by oral route.     • rosuvastatin (CRESTOR) 20 MG tablet      • sodium bicarbonate 650 MG tablet Take 1,300 mg by mouth 3 (Three) Times a Day.     • vitamin D (ERGOCALCIFEROL) 1.25 MG (56340 UT) capsule capsule Take 50,000 Units by mouth 1 (One) Time Per Week. 3x weekly     • dexamethasone (DECADRON) 1 MG tablet Take 1 tablet by mouth Daily for 14 days. 14 tablet 2     No facility-administered encounter medications on file as of 6/16/2021.     ADULT ILLNESSES:  Patient Active Problem List   Diagnosis Code   • Type 2 diabetes mellitus with stage 3 chronic kidney disease, with long-term current use of insulin (CMS/[facility]) E11.22, N18.30, Z79.4   • Mixed diabetic hyperlipidemia associated with type 2 diabetes mellitus (CMS/[facility]) E11.69, E78.2   • Hypertension associated with diabetes (CMS/[facility]) E11.59, I15.2   • Vitamin D deficiency E55.9    • B12 deficiency E53.8   • Anemia in CKD (chronic kidney disease) N18.9, D63.1   • Acquired hypothyroidism E03.9   • Chronic kidney disease, stage 4 (severe) (CMS/HCC) N18.4   • Anemia D64.9   • Anxiety F41.9   • Deep venous thrombosis of lower extremity (CMS/HCC) I82.409   • Disease of liver K76.9   • Embolism (CMS/HCC) I74.9   • Hypertriglyceridemia E78.1   • Hypocalcemia E83.51   • Mood disorder (CMS/HCC) F39   • Obesity E66.9   • Sleep apnea G47.30   • Acute renal failure superimposed on stage 3 chronic kidney disease (CMS/HCC) N17.9, N18.30   • Anemia of chronic renal failure N18.9, D63.1   • Hypertension I10   • Diabetes mellitus (CMS/HCC) E11.9   • Thyroid disease E07.9   • Hypothyroidism E03.9   • Vitamin D deficiency E55.9   • Anemia due to stage 4 chronic kidney disease (CMS/HCC) N18.4, D63.1   • Stage 4 chronic kidney disease (CMS/HCC) N18.4   • Allergic rhinitis with postnasal drip J30.9, R09.82   • Bone marrow suppression D75.89   • Abnormal coagulation profile  R79.1     SURGERIES:  Past Surgical History:   Procedure Laterality Date   • ADENOIDECTOMY     • ANAL FISTULA REPAIR      x 2    • CHOLECYSTECTOMY     • COLONOSCOPY  01/02/2014   • COLONOSCOPY N/A 3/22/2017    Procedure: COLONOSCOPY WITH ANESTHESIA;  Surgeon: Mono Linder MD;  Location: [facility] ENDOSCOPY;  Service:    • D & C HYSTEROSCOPY N/A 7/25/2018    Procedure: DILATATION AND CURETTAGE HYSTEROSCOPY;  Surgeon: Michael Castaneda MD;  Location: [facility] OR;  Service: Obstetrics/Gynecology   • DILATATION AND CURETTAGE      X 2   • ENDOSCOPY     • HYSTERECTOMY     • TONSILLECTOMY       HEALTH MAINTENANCE ITEMS:  Health Maintenance Due   Topic Date Due   • Pneumococcal Vaccine 0-64 (1 of 3 - PCV13) [birth year]/1966   • TDAP/TD VACCINES (1 - Tdap) Never done   • ZOSTER VACCINE (1 of 2) Never done   • HEPATITIS C SCREENING  Never done   • ANNUAL WELLNESS VISIT  Never done   • DIABETIC FOOT EXAM  Never done   • PAP SMEAR  Never done   • DXA SCAN   11/14/2020       <no information>  Last Completed Colonoscopy     This patient has no relevant Health Maintenance data.        Immunization History   Administered Date(s) Administered   • COVID-19 (CAMILA) 03/09/2021   • Flu Mist 11/06/2014   • Pneumococcal, Unspecified 01/01/2015     Last Completed Mammogram          MAMMOGRAM (Every 2 Years) Next due on 8/12/2022 08/12/2020  Mammo Screening Digital Tomosynthesis Bilateral With CAD    05/16/2018  Mammo Screening Digital Tomosynthesis Bilateral With CAD    01/20/2015  MAMMOGRAPHY SCREENING BILATERAL                  FAMILY HISTORY:  Family History   Problem Relation Age of Onset   • Diabetes Other    • Heart failure Other    • Cancer Other    • Kidney disease Other    • Cancer Mother    • Cancer Father    • Heart disease Father    • Diabetes Father    • Obesity Father    • Stroke Father    • Cancer Maternal Grandmother    • Uterine cancer Maternal Grandmother    • Diabetes Maternal Grandfather    • Cancer Paternal Grandmother    • Colon cancer Paternal Grandmother    • Diabetes Paternal Grandfather    • Heart disease Paternal Grandfather    • No Known Problems Sister    • No Known Problems Brother    • No Known Problems Daughter    • No Known Problems Maternal Aunt    • No Known Problems Paternal Aunt    • BRCA 1/2 Neg Hx    • Breast cancer Neg Hx    • Endometrial cancer Neg Hx    • Ovarian cancer Neg Hx      SOCIAL HISTORY:  Social History     Socioeconomic History   • Marital status:      Spouse name: Not on file   • Number of children: Not on file   • Years of education: Not on file   • Highest education level: Not on file   Tobacco Use   • Smoking status: Never Smoker   • Smokeless tobacco: Never Used   Vaping Use   • Vaping Use: Never used   Substance and Sexual Activity   • Alcohol use: No   • Drug use: No   • Sexual activity: Never     Birth control/protection: Post-menopausal       REVIEW OF SYSTEMS:  Review of Systems   Constitutional: Positive  "for activity change, fatigue and unexpected weight gain.   HENT: Negative.    Eyes: Negative.    Respiratory: Negative.    Cardiovascular: Negative.    Gastrointestinal: Negative.    Endocrine: Negative.    Genitourinary: Negative.    Musculoskeletal: Positive for arthralgias, back pain, gait problem, joint swelling, myalgias and neck pain.   Allergic/Immunologic: Negative.    Neurological: Positive for dizziness, weakness and numbness.   Hematological: Negative.    Psychiatric/Behavioral: Positive for depressed mood and stress.       /68   Pulse 68   Temp 97.7 °F (36.5 °C) (Temporal)   Resp 16   Ht 165.1 cm (65\")   Wt (!) 176 kg (388 lb 3.2 oz)   LMP  (LMP Unknown)   SpO2 98%   Breastfeeding No   BMI 64.60 kg/m²  Body surface area is 2.62 meters squared.  Pain Score    06/16/21 1307   PainSc:   2   PainLoc: Leg       Objective   Vital Signs:   /68   Pulse 68   Temp 97.7 °F (36.5 °C) (Temporal)   Resp 16   Ht 165.1 cm (65\")   Wt (!) 176 kg (388 lb 3.2 oz)   LMP  (LMP Unknown)   SpO2 98%   Breastfeeding No   BMI 64.60 kg/m²  Body surface area is 2.62 meters squared.  Pain Score    06/16/21 1307   PainSc:   2   PainLoc: Leg     Maria C Shrestha reports a pain score of 2.  Given her pain assessment as noted, treatment options were discussed and the following options were decided upon as a follow-up plan to address the patient's pain: .      Patient's Body mass index is 64.6 kg/m². indicating that she is .      Physical Exam  Constitutional:       General: She is in acute distress.      Appearance: She is obese.   HENT:      Head: Atraumatic.      Mouth/Throat:      Mouth: Mucous membranes are dry.   Eyes:      Extraocular Movements: Extraocular movements intact.   Cardiovascular:      Rate and Rhythm: Normal rate.   Pulmonary:      Effort: Pulmonary effort is normal.   Abdominal:      Palpations: Abdomen is soft.   Musculoskeletal:         General: Normal range of motion.      Cervical back: " Normal range of motion.   Skin:     General: Skin is warm and dry.   Neurological:      General: No focal deficit present.      Mental Status: She is oriented to person, place, and time.   Psychiatric:         Mood and Affect: Mood normal.         Behavior: Behavior normal.            Result Review :     LABS    Lab Results - Last 18 Months   Lab Units 06/16/21  0955 05/12/21  1244 04/14/21  1340 03/17/21  1301 03/17/21  0904 02/17/21  1253 01/18/21  1321 12/21/20  1400   HEMOGLOBIN g/dL 10.2* 10.8* 10.0* 9.8* 9.9* 10.3* 11.1* 10.5*   HEMATOCRIT % 32.3* 33.3* 30.2* 29.8* 30.6* 32.3* 34.2 31.9*   MCV fL 94.4 92.0 91.0 92.3 91.6 92.8 92.4 93.0   WBC K/uL 8.4 8.29 9.35 9.36 8.60 9.87 9.96 10.46   RDW % 15.3* 15.2 14.8 15.3 14.8 15.2 15.2 15.4   MPV fL 10.6 9.9 10.0 10.2 8.9 9.8 10.4 10.5   PLATELETS K/uL 203 219 209 186 220 212 217 193   IMM GRAN % %  --  0.6* 0.6* 0.4  --  0.6* 0.8* 0.6*   NEUTROS ABS 10*3/mm3  --  6.49 6.67 6.86 6.30 7.04* 7.28* 7.75*   LYMPHS ABS 10*3/mm3  --  1.03 1.82 1.72 1.90 1.92 1.84 1.88   MONOS ABS 10*3/mm3  --  0.44 0.56 0.44  --  0.54 0.47 0.49   EOS ABS 10*3/mm3  --  0.24 0.20 0.26  --  0.27 0.24 0.25   BASOS ABS 10*3/mm3  --  0.04 0.04 0.04  --  0.04 0.05 0.03   IMMATURE GRANS (ABS) 10*3/mm3  --  0.05 0.06* 0.04  --  0.06* 0.08* 0.06*   NRBC /100 WBC  --  0.0 0.0 0.0  --  0.0 0.0 0.0       Lab Results - Last 18 Months   Lab Units 05/12/21  1244 04/14/21  1340 03/17/21  1301 03/17/21  0904 02/17/21  1253 01/18/21  1321   GLUCOSE mg/dL 193* 126* 207* 145* 135* 280*   SODIUM mmol/L 140 143 141 138 141 139   POTASSIUM mmol/L 4.2 3.4* 4.0 3.8 4.0 4.4   CO2 mmol/L 19.0* 24.0 22.0 22.0* 26.0 23.0   CHLORIDE mmol/L 110* 106 107 108 108* 106   ANION GAP mmol/L 11.0 13.0 12.0 8.0 7.0 10.0   CREATININE mg/dL 3.28* 3.21* 2.75* 2.74* 2.55* 2.84*   BUN mg/dL 42* 28* 34* 33* 32* 48*   BUN / CREAT RATIO  12.8 8.7 12.4 12.0 12.5 16.9   CALCIUM mg/dL 8.8 8.0* 8.5* 8.1*  8.5 8.9 9.5   EGFR IF NONAFRICN AM  mL/min/1.73 14* 15* 18* 18* 19* 17*   ALK PHOS U/L 88 85 89 89 89 102   TOTAL PROTEIN g/dL 6.2 6.3 6.4 6.7 6.9 7.5   ALT (SGPT) U/L 7 7 9 10 9 6   AST (SGOT) U/L 10 11 14 18 10 10   BILIRUBIN mg/dL 0.2 0.2 <0.2 0.2 0.2 0.2   ALBUMIN g/dL 3.10* 3.10* 3.10* 3.30* 3.30* 3.60   GLOBULIN gm/dL 3.1 3.2 3.3 3.4 3.6 3.9       Lab Results - Last 18 Months   Lab Units 05/12/21  1244   REFERENCE LAB REPORT  See Attached Report       Lab Results - Last 18 Months   Lab Units 05/12/21  1244 04/14/21  1340 03/17/21  1301 03/17/21  0904 02/17/21  1253 10/14/20  1023 08/19/20  1255 07/15/20  1006 04/13/20  1038 01/08/20  0923 12/18/19  1137   IRON mcg/dL 54 55 42  --  66 90 83  --   --   --   --    TIBC mcg/dL 244* 268* 261*  --  297* 273* 256*  --   --   --   --    IRON SATURATION % 22 21 16*  --  22 33 32  --   --   --   --    FERRITIN ng/mL 462.30* 538.70* 322.40*  --  466.10* 405.70* 639.60*  --   --   --   --    TSH uIU/mL  --   --   --  4.940*  --  2.650  --  3.010 7.250* 3.590 3.640              Assessment and Plan    Problem List Items Addressed This Visit        Other    Anemia    Current Assessment & Plan     1. With the replacement of Fe her Hgb is only 10 ranges  2. She does have anemia of chronci DS or possible MDS         Relevant Orders    Ferritin    Obesity    Current Assessment & Plan     1. We have discussed about positive impact of significant weight reduction on her abnormal CBC         Relevant Orders    Ambulatory Referral to Bariatric Surgery    CBC & Differential    Comprehensive Metabolic Panel    Ferritin    Sedimentation Rate    Bone marrow suppression - Primary    Current Assessment & Plan     1. She clearly has abnormal BM activity and we have discussed about potential BM BX         Relevant Orders    CT Guided Bone Marrow Biopsy And Aspiration    CBC & Differential    Comprehensive Metabolic Panel    Ferritin    Sedimentation Rate    Abnormal coagulation profile     Relevant Orders    CBC &  Differential    Comprehensive Metabolic Panel    Ferritin    Sedimentation Rate        In summary    1. She clearly has anemia of chronic DS  2. Her response from EPOGEN has been very minimal  3. She will try low dose of dexamethasone  4. Her CBC is worrisome for immature granulocyte and other findings worrisome for MDS/MPD, she will have her BM BX done    Follow Up     Patient was given instructions and counseling regarding her condition or for health maintenance advice. Please see specific information pulled into the AVS if appropriate.

## 2021-06-17 ENCOUNTER — ANESTHESIA EVENT (OUTPATIENT)
Dept: OPERATING ROOM | Age: 61
End: 2021-06-17
Payer: MEDICARE

## 2021-06-17 NOTE — ASSESSMENT & PLAN NOTE
1. With the replacement of Fe her Hgb is only 10 ranges  2. She does have anemia of chronci DS or possible MDS

## 2021-06-18 ENCOUNTER — HOSPITAL ENCOUNTER (OUTPATIENT)
Age: 61
Setting detail: OUTPATIENT SURGERY
Discharge: HOME | End: 2021-06-18
Attending: SURGERY | Admitting: SURGERY
Payer: MEDICARE

## 2021-06-18 ENCOUNTER — ANESTHESIA (OUTPATIENT)
Dept: OPERATING ROOM | Age: 61
End: 2021-06-18
Payer: MEDICARE

## 2021-06-18 VITALS
HEART RATE: 86 BPM | DIASTOLIC BLOOD PRESSURE: 70 MMHG | RESPIRATION RATE: 14 BRPM | TEMPERATURE: 97.4 F | HEIGHT: 65 IN | WEIGHT: 293 LBS | OXYGEN SATURATION: 92 % | SYSTOLIC BLOOD PRESSURE: 140 MMHG | BODY MASS INDEX: 48.82 KG/M2

## 2021-06-18 VITALS
RESPIRATION RATE: 14 BRPM | DIASTOLIC BLOOD PRESSURE: 72 MMHG | OXYGEN SATURATION: 95 % | SYSTOLIC BLOOD PRESSURE: 121 MMHG | TEMPERATURE: 95 F

## 2021-06-18 DIAGNOSIS — N18.4 STAGE 4 CHRONIC KIDNEY DISEASE (HCC): Primary | ICD-10-CM

## 2021-06-18 LAB
ALBUMIN SERPL-MCNC: 3.5 G/DL (ref 3.5–5.2)
ALP BLD-CCNC: 104 U/L (ref 35–104)
ALT SERPL-CCNC: 8 U/L (ref 5–33)
ANION GAP SERPL CALCULATED.3IONS-SCNC: 14 MMOL/L (ref 7–19)
APTT: 27.4 SEC (ref 26–36.2)
AST SERPL-CCNC: 12 U/L (ref 5–32)
BILIRUB SERPL-MCNC: <0.2 MG/DL (ref 0.2–1.2)
BUN BLDV-MCNC: 45 MG/DL (ref 8–23)
CALCIUM SERPL-MCNC: 8.4 MG/DL (ref 8.8–10.2)
CHLORIDE BLD-SCNC: 103 MMOL/L (ref 98–111)
CO2: 20 MMOL/L (ref 22–29)
CREAT SERPL-MCNC: 3.3 MG/DL (ref 0.5–0.9)
GFR AFRICAN AMERICAN: 17
GFR NON-AFRICAN AMERICAN: 14
GLUCOSE BLD-MCNC: 185 MG/DL (ref 74–109)
HCT VFR BLD CALC: 35.3 % (ref 37–47)
HEMOGLOBIN: 11.4 G/DL (ref 12–16)
INR BLD: 0.97 (ref 0.88–1.18)
MCH RBC QN AUTO: 30.2 PG (ref 27–31)
MCHC RBC AUTO-ENTMCNC: 32.3 G/DL (ref 33–37)
MCV RBC AUTO: 93.4 FL (ref 81–99)
PDW BLD-RTO: 15.5 % (ref 11.5–14.5)
PLATELET # BLD: 223 K/UL (ref 130–400)
PMV BLD AUTO: 9.9 FL (ref 9.4–12.3)
POTASSIUM SERPL-SCNC: 4.5 MMOL/L (ref 3.5–4.9)
PROTHROMBIN TIME: 12.8 SEC (ref 12–14.6)
RBC # BLD: 3.78 M/UL (ref 4.2–5.4)
SODIUM BLD-SCNC: 137 MMOL/L (ref 136–145)
TOTAL PROTEIN: 7.4 G/DL (ref 6.6–8.7)
WBC # BLD: 10.9 K/UL (ref 4.8–10.8)

## 2021-06-18 PROCEDURE — 36821 AV FUSION DIRECT ANY SITE: CPT | Performed by: SURGERY

## 2021-06-18 PROCEDURE — 6360000002 HC RX W HCPCS: Performed by: ANESTHESIOLOGY

## 2021-06-18 PROCEDURE — 3700000000 HC ANESTHESIA ATTENDED CARE: Performed by: SURGERY

## 2021-06-18 PROCEDURE — 6360000002 HC RX W HCPCS: Performed by: NURSE PRACTITIONER

## 2021-06-18 PROCEDURE — 7100000000 HC PACU RECOVERY - FIRST 15 MIN: Performed by: SURGERY

## 2021-06-18 PROCEDURE — 6360000002 HC RX W HCPCS: Performed by: REGISTERED NURSE

## 2021-06-18 PROCEDURE — 2580000003 HC RX 258: Performed by: SURGERY

## 2021-06-18 PROCEDURE — 64415 NJX AA&/STRD BRCH PLXS IMG: CPT | Performed by: REGISTERED NURSE

## 2021-06-18 PROCEDURE — 2580000003 HC RX 258: Performed by: REGISTERED NURSE

## 2021-06-18 PROCEDURE — 80053 COMPREHEN METABOLIC PANEL: CPT

## 2021-06-18 PROCEDURE — 85730 THROMBOPLASTIN TIME PARTIAL: CPT

## 2021-06-18 PROCEDURE — 7100000010 HC PHASE II RECOVERY - FIRST 15 MIN: Performed by: SURGERY

## 2021-06-18 PROCEDURE — 2580000003 HC RX 258: Performed by: ANESTHESIOLOGY

## 2021-06-18 PROCEDURE — 3600000004 HC SURGERY LEVEL 4 BASE: Performed by: SURGERY

## 2021-06-18 PROCEDURE — 3600000014 HC SURGERY LEVEL 4 ADDTL 15MIN: Performed by: SURGERY

## 2021-06-18 PROCEDURE — 3700000001 HC ADD 15 MINUTES (ANESTHESIA): Performed by: SURGERY

## 2021-06-18 PROCEDURE — 6360000002 HC RX W HCPCS: Performed by: SURGERY

## 2021-06-18 PROCEDURE — 36415 COLL VENOUS BLD VENIPUNCTURE: CPT

## 2021-06-18 PROCEDURE — 2709999900 HC NON-CHARGEABLE SUPPLY: Performed by: SURGERY

## 2021-06-18 PROCEDURE — 2500000003 HC RX 250 WO HCPCS: Performed by: REGISTERED NURSE

## 2021-06-18 PROCEDURE — 7100000011 HC PHASE II RECOVERY - ADDTL 15 MIN: Performed by: SURGERY

## 2021-06-18 PROCEDURE — 85610 PROTHROMBIN TIME: CPT

## 2021-06-18 PROCEDURE — 7100000001 HC PACU RECOVERY - ADDTL 15 MIN: Performed by: SURGERY

## 2021-06-18 PROCEDURE — 85027 COMPLETE CBC AUTOMATED: CPT

## 2021-06-18 PROCEDURE — 6370000000 HC RX 637 (ALT 250 FOR IP): Performed by: NURSE PRACTITIONER

## 2021-06-18 RX ORDER — SODIUM CHLORIDE 9 MG/ML
25 INJECTION, SOLUTION INTRAVENOUS PRN
Status: DISCONTINUED | OUTPATIENT
Start: 2021-06-18 | End: 2021-06-18 | Stop reason: HOSPADM

## 2021-06-18 RX ORDER — HYDRALAZINE HYDROCHLORIDE 20 MG/ML
5 INJECTION INTRAMUSCULAR; INTRAVENOUS EVERY 10 MIN PRN
Status: DISCONTINUED | OUTPATIENT
Start: 2021-06-18 | End: 2021-06-18 | Stop reason: HOSPADM

## 2021-06-18 RX ORDER — PROPOFOL 10 MG/ML
INJECTION, EMULSION INTRAVENOUS PRN
Status: DISCONTINUED | OUTPATIENT
Start: 2021-06-18 | End: 2021-06-18 | Stop reason: SDUPTHER

## 2021-06-18 RX ORDER — LIDOCAINE HYDROCHLORIDE 20 MG/ML
INJECTION, SOLUTION INFILTRATION; PERINEURAL
Status: DISCONTINUED
Start: 2021-06-18 | End: 2021-06-18 | Stop reason: HOSPADM

## 2021-06-18 RX ORDER — MORPHINE SULFATE 4 MG/ML
2 INJECTION, SOLUTION INTRAMUSCULAR; INTRAVENOUS EVERY 5 MIN PRN
Status: DISCONTINUED | OUTPATIENT
Start: 2021-06-18 | End: 2021-06-18 | Stop reason: HOSPADM

## 2021-06-18 RX ORDER — SODIUM CHLORIDE 450 MG/100ML
INJECTION, SOLUTION INTRAVENOUS CONTINUOUS
Status: DISCONTINUED | OUTPATIENT
Start: 2021-06-18 | End: 2021-06-18 | Stop reason: HOSPADM

## 2021-06-18 RX ORDER — ONDANSETRON 2 MG/ML
INJECTION INTRAMUSCULAR; INTRAVENOUS PRN
Status: DISCONTINUED | OUTPATIENT
Start: 2021-06-18 | End: 2021-06-18 | Stop reason: SDUPTHER

## 2021-06-18 RX ORDER — EPHEDRINE SULFATE/0.9% NACL/PF 50 MG/5 ML
SYRINGE (ML) INTRAVENOUS PRN
Status: DISCONTINUED | OUTPATIENT
Start: 2021-06-18 | End: 2021-06-18 | Stop reason: SDUPTHER

## 2021-06-18 RX ORDER — MEPERIDINE HYDROCHLORIDE 50 MG/ML
12.5 INJECTION INTRAMUSCULAR; INTRAVENOUS; SUBCUTANEOUS EVERY 5 MIN PRN
Status: DISCONTINUED | OUTPATIENT
Start: 2021-06-18 | End: 2021-06-18 | Stop reason: HOSPADM

## 2021-06-18 RX ORDER — DIPHENHYDRAMINE HYDROCHLORIDE 50 MG/ML
12.5 INJECTION INTRAMUSCULAR; INTRAVENOUS
Status: DISCONTINUED | OUTPATIENT
Start: 2021-06-18 | End: 2021-06-18 | Stop reason: HOSPADM

## 2021-06-18 RX ORDER — ENALAPRILAT 2.5 MG/2ML
1.25 INJECTION INTRAVENOUS
Status: DISCONTINUED | OUTPATIENT
Start: 2021-06-18 | End: 2021-06-18 | Stop reason: HOSPADM

## 2021-06-18 RX ORDER — SODIUM CHLORIDE 0.9 % (FLUSH) 0.9 %
10 SYRINGE (ML) INJECTION PRN
Status: DISCONTINUED | OUTPATIENT
Start: 2021-06-18 | End: 2021-06-18 | Stop reason: HOSPADM

## 2021-06-18 RX ORDER — DEXAMETHASONE 1 MG
1 TABLET ORAL
Status: ON HOLD | COMMUNITY
End: 2021-08-13 | Stop reason: HOSPADM

## 2021-06-18 RX ORDER — METOCLOPRAMIDE HYDROCHLORIDE 5 MG/ML
10 INJECTION INTRAMUSCULAR; INTRAVENOUS
Status: DISCONTINUED | OUTPATIENT
Start: 2021-06-18 | End: 2021-06-18 | Stop reason: HOSPADM

## 2021-06-18 RX ORDER — HYDROCODONE BITARTRATE AND ACETAMINOPHEN 5; 325 MG/1; MG/1
1 TABLET ORAL EVERY 8 HOURS PRN
Qty: 20 TABLET | Refills: 0 | Status: SHIPPED | OUTPATIENT
Start: 2021-06-18 | End: 2021-06-25

## 2021-06-18 RX ORDER — SODIUM CHLORIDE 0.9 % (FLUSH) 0.9 %
5-40 SYRINGE (ML) INJECTION EVERY 12 HOURS SCHEDULED
Status: DISCONTINUED | OUTPATIENT
Start: 2021-06-18 | End: 2021-06-18 | Stop reason: HOSPADM

## 2021-06-18 RX ORDER — SODIUM CHLORIDE 0.9 % (FLUSH) 0.9 %
5-40 SYRINGE (ML) INJECTION PRN
Status: DISCONTINUED | OUTPATIENT
Start: 2021-06-18 | End: 2021-06-18 | Stop reason: HOSPADM

## 2021-06-18 RX ORDER — SODIUM CHLORIDE, SODIUM LACTATE, POTASSIUM CHLORIDE, CALCIUM CHLORIDE 600; 310; 30; 20 MG/100ML; MG/100ML; MG/100ML; MG/100ML
INJECTION, SOLUTION INTRAVENOUS CONTINUOUS
Status: DISCONTINUED | OUTPATIENT
Start: 2021-06-18 | End: 2021-06-18 | Stop reason: HOSPADM

## 2021-06-18 RX ORDER — HYDROMORPHONE HYDROCHLORIDE 1 MG/ML
0.5 INJECTION, SOLUTION INTRAMUSCULAR; INTRAVENOUS; SUBCUTANEOUS EVERY 5 MIN PRN
Status: DISCONTINUED | OUTPATIENT
Start: 2021-06-18 | End: 2021-06-18 | Stop reason: HOSPADM

## 2021-06-18 RX ORDER — ACETAMINOPHEN 325 MG/1
650 TABLET ORAL EVERY 4 HOURS PRN
Status: DISCONTINUED | OUTPATIENT
Start: 2021-06-18 | End: 2021-06-18 | Stop reason: HOSPADM

## 2021-06-18 RX ORDER — HYDROCODONE BITARTRATE AND ACETAMINOPHEN 5; 325 MG/1; MG/1
2 TABLET ORAL EVERY 4 HOURS PRN
Status: DISCONTINUED | OUTPATIENT
Start: 2021-06-18 | End: 2021-06-18 | Stop reason: HOSPADM

## 2021-06-18 RX ORDER — SODIUM CHLORIDE, SODIUM LACTATE, POTASSIUM CHLORIDE, CALCIUM CHLORIDE 600; 310; 30; 20 MG/100ML; MG/100ML; MG/100ML; MG/100ML
INJECTION, SOLUTION INTRAVENOUS CONTINUOUS PRN
Status: DISCONTINUED | OUTPATIENT
Start: 2021-06-18 | End: 2021-06-18 | Stop reason: SDUPTHER

## 2021-06-18 RX ORDER — MIDAZOLAM HYDROCHLORIDE 1 MG/ML
2 INJECTION INTRAMUSCULAR; INTRAVENOUS
Status: COMPLETED | OUTPATIENT
Start: 2021-06-18 | End: 2021-06-18

## 2021-06-18 RX ORDER — SODIUM CHLORIDE 9 MG/ML
INJECTION, SOLUTION INTRAVENOUS CONTINUOUS
Status: DISCONTINUED | OUTPATIENT
Start: 2021-06-18 | End: 2021-06-18 | Stop reason: HOSPADM

## 2021-06-18 RX ORDER — LABETALOL HYDROCHLORIDE 5 MG/ML
5 INJECTION, SOLUTION INTRAVENOUS EVERY 10 MIN PRN
Status: DISCONTINUED | OUTPATIENT
Start: 2021-06-18 | End: 2021-06-18 | Stop reason: HOSPADM

## 2021-06-18 RX ORDER — ROPIVACAINE HYDROCHLORIDE 5 MG/ML
INJECTION, SOLUTION EPIDURAL; INFILTRATION; PERINEURAL
Status: COMPLETED
Start: 2021-06-18 | End: 2021-06-18

## 2021-06-18 RX ORDER — DEXAMETHASONE SODIUM PHOSPHATE 10 MG/ML
INJECTION, SOLUTION INTRAMUSCULAR; INTRAVENOUS PRN
Status: DISCONTINUED | OUTPATIENT
Start: 2021-06-18 | End: 2021-06-18 | Stop reason: SDUPTHER

## 2021-06-18 RX ORDER — LIDOCAINE HYDROCHLORIDE 10 MG/ML
1 INJECTION, SOLUTION EPIDURAL; INFILTRATION; INTRACAUDAL; PERINEURAL
Status: DISCONTINUED | OUTPATIENT
Start: 2021-06-18 | End: 2021-06-18 | Stop reason: HOSPADM

## 2021-06-18 RX ORDER — ONDANSETRON 2 MG/ML
4 INJECTION INTRAMUSCULAR; INTRAVENOUS EVERY 8 HOURS PRN
Status: DISCONTINUED | OUTPATIENT
Start: 2021-06-18 | End: 2021-06-18 | Stop reason: HOSPADM

## 2021-06-18 RX ORDER — SODIUM CHLORIDE 0.9 % (FLUSH) 0.9 %
10 SYRINGE (ML) INJECTION EVERY 12 HOURS SCHEDULED
Status: DISCONTINUED | OUTPATIENT
Start: 2021-06-18 | End: 2021-06-18 | Stop reason: HOSPADM

## 2021-06-18 RX ORDER — ROPIVACAINE HYDROCHLORIDE 5 MG/ML
INJECTION, SOLUTION EPIDURAL; INFILTRATION; PERINEURAL
Status: COMPLETED | OUTPATIENT
Start: 2021-06-18 | End: 2021-06-18

## 2021-06-18 RX ORDER — MORPHINE SULFATE 4 MG/ML
4 INJECTION, SOLUTION INTRAMUSCULAR; INTRAVENOUS EVERY 5 MIN PRN
Status: DISCONTINUED | OUTPATIENT
Start: 2021-06-18 | End: 2021-06-18 | Stop reason: HOSPADM

## 2021-06-18 RX ORDER — LIDOCAINE HYDROCHLORIDE 10 MG/ML
INJECTION, SOLUTION EPIDURAL; INFILTRATION; INTRACAUDAL; PERINEURAL PRN
Status: DISCONTINUED | OUTPATIENT
Start: 2021-06-18 | End: 2021-06-18 | Stop reason: SDUPTHER

## 2021-06-18 RX ORDER — FENTANYL CITRATE 50 UG/ML
50 INJECTION, SOLUTION INTRAMUSCULAR; INTRAVENOUS
Status: DISCONTINUED | OUTPATIENT
Start: 2021-06-18 | End: 2021-06-18 | Stop reason: HOSPADM

## 2021-06-18 RX ORDER — FENTANYL CITRATE 50 UG/ML
25 INJECTION, SOLUTION INTRAMUSCULAR; INTRAVENOUS
Status: DISCONTINUED | OUTPATIENT
Start: 2021-06-18 | End: 2021-06-18 | Stop reason: HOSPADM

## 2021-06-18 RX ORDER — FENTANYL CITRATE 50 UG/ML
INJECTION, SOLUTION INTRAMUSCULAR; INTRAVENOUS PRN
Status: DISCONTINUED | OUTPATIENT
Start: 2021-06-18 | End: 2021-06-18 | Stop reason: SDUPTHER

## 2021-06-18 RX ORDER — HYDROCODONE BITARTRATE AND ACETAMINOPHEN 5; 325 MG/1; MG/1
1 TABLET ORAL EVERY 4 HOURS PRN
Status: DISCONTINUED | OUTPATIENT
Start: 2021-06-18 | End: 2021-06-18 | Stop reason: HOSPADM

## 2021-06-18 RX ORDER — HEPARIN SODIUM 1000 [USP'U]/ML
INJECTION, SOLUTION INTRAVENOUS; SUBCUTANEOUS PRN
Status: DISCONTINUED | OUTPATIENT
Start: 2021-06-18 | End: 2021-06-18 | Stop reason: SDUPTHER

## 2021-06-18 RX ORDER — ASPIRIN 81 MG/1
81 TABLET ORAL ONCE
Status: COMPLETED | OUTPATIENT
Start: 2021-06-18 | End: 2021-06-18

## 2021-06-18 RX ORDER — PROMETHAZINE HYDROCHLORIDE 25 MG/ML
6.25 INJECTION, SOLUTION INTRAMUSCULAR; INTRAVENOUS
Status: DISCONTINUED | OUTPATIENT
Start: 2021-06-18 | End: 2021-06-18 | Stop reason: HOSPADM

## 2021-06-18 RX ORDER — GLYCOPYRROLATE 0.2 MG/ML
INJECTION INTRAMUSCULAR; INTRAVENOUS PRN
Status: DISCONTINUED | OUTPATIENT
Start: 2021-06-18 | End: 2021-06-18 | Stop reason: SDUPTHER

## 2021-06-18 RX ORDER — HYDROMORPHONE HYDROCHLORIDE 1 MG/ML
0.25 INJECTION, SOLUTION INTRAMUSCULAR; INTRAVENOUS; SUBCUTANEOUS EVERY 5 MIN PRN
Status: DISCONTINUED | OUTPATIENT
Start: 2021-06-18 | End: 2021-06-18 | Stop reason: HOSPADM

## 2021-06-18 RX ADMIN — SODIUM CHLORIDE: 4.5 INJECTION, SOLUTION INTRAVENOUS at 06:44

## 2021-06-18 RX ADMIN — ASPIRIN 81 MG: 81 TABLET, COATED ORAL at 06:45

## 2021-06-18 RX ADMIN — Medication 15 MG: at 08:02

## 2021-06-18 RX ADMIN — Medication 1000 MG: at 06:44

## 2021-06-18 RX ADMIN — HEPARIN SODIUM 3000 UNITS: 1000 INJECTION INTRAVENOUS; SUBCUTANEOUS at 08:11

## 2021-06-18 RX ADMIN — MIDAZOLAM 2 MG: 1 INJECTION INTRAMUSCULAR; INTRAVENOUS at 06:58

## 2021-06-18 RX ADMIN — ONDANSETRON 4 MG: 2 INJECTION INTRAMUSCULAR; INTRAVENOUS at 08:31

## 2021-06-18 RX ADMIN — Medication 0.2 MG: at 07:56

## 2021-06-18 RX ADMIN — Medication 10 MG: at 07:54

## 2021-06-18 RX ADMIN — DEXAMETHASONE SODIUM PHOSPHATE 10 MG: 10 INJECTION, SOLUTION INTRAMUSCULAR; INTRAVENOUS at 07:44

## 2021-06-18 RX ADMIN — FENTANYL CITRATE 50 MCG: 50 INJECTION, SOLUTION INTRAMUSCULAR; INTRAVENOUS at 07:50

## 2021-06-18 RX ADMIN — ROPIVACAINE HYDROCHLORIDE 15 ML: 5 INJECTION, SOLUTION EPIDURAL; INFILTRATION; PERINEURAL at 07:06

## 2021-06-18 RX ADMIN — LIDOCAINE HYDROCHLORIDE 50 MG: 10 INJECTION, SOLUTION EPIDURAL; INFILTRATION; INTRACAUDAL; PERINEURAL at 07:34

## 2021-06-18 RX ADMIN — Medication 0.2 MG: at 07:59

## 2021-06-18 RX ADMIN — PROPOFOL 250 MG: 10 INJECTION, EMULSION INTRAVENOUS at 07:34

## 2021-06-18 RX ADMIN — DEXAMETHASONE SODIUM PHOSPHATE 10 MG: 10 INJECTION, SOLUTION INTRAMUSCULAR; INTRAVENOUS at 07:47

## 2021-06-18 RX ADMIN — SODIUM CHLORIDE, POTASSIUM CHLORIDE, SODIUM LACTATE AND CALCIUM CHLORIDE: 600; 310; 30; 20 INJECTION, SOLUTION INTRAVENOUS at 07:27

## 2021-06-18 ASSESSMENT — PAIN SCALES - GENERAL
PAINLEVEL_OUTOF10: 3
PAINLEVEL_OUTOF10: 3

## 2021-06-18 ASSESSMENT — LIFESTYLE VARIABLES: SMOKING_STATUS: 0

## 2021-06-18 ASSESSMENT — PAIN DESCRIPTION - PAIN TYPE
TYPE: SURGICAL PAIN
TYPE: SURGICAL PAIN

## 2021-06-18 ASSESSMENT — PAIN DESCRIPTION - DESCRIPTORS
DESCRIPTORS: ACHING
DESCRIPTORS: SORE

## 2021-06-18 ASSESSMENT — ENCOUNTER SYMPTOMS: SHORTNESS OF BREATH: 1

## 2021-06-18 NOTE — ANESTHESIA POSTPROCEDURE EVALUATION
Department of Anesthesiology  Postprocedure Note    Patient: James Brown  MRN: [id number]  YOB: 1960  Date of evaluation: 6/18/2021  Time:  8:48 AM     Procedure Summary     Date: 06/18/21 Room / Location: [address]    Anesthesia Start: 3815 Anesthesia Stop:     Procedure: LEFT BRACHIAL-CEPHALIC AV FISTULA CREATION (Left ) Diagnosis: (N18.4)    Surgeons: Mehnaz Huff MD Responsible Provider: JAMES Adame CRNA    Anesthesia Type: general, regional ASA Status: 3          Anesthesia Type: No value filed. No Phase I: No Score: 10    No Phase II:      Last vitals: Reviewed and per EMR flowsheets.        Anesthesia Post Evaluation    Patient location during evaluation: PACU  Patient participation: complete - patient participated  Level of consciousness: responsive to verbal stimuli  Pain score: 2  Airway patency: patent  Nausea & Vomiting: no nausea and no vomiting  Complications: no  Cardiovascular status: hemodynamically stable and blood pressure returned to baseline  Respiratory status: face mask  Hydration status: euvolemic

## 2021-06-18 NOTE — ANESTHESIA PRE PROCEDURE
Department of Anesthesiology  Preprocedure Note       Name:  Mary Conn   Age:  64 y.o.  :  1960                                          MRN:  [id number]         Date:  2021      Surgeon: Osman Alvarado):  Judd Becerril MD    Procedure: Procedure(s):  LEFT BRACHIAL-CEPHALIC AV FISTULA CREATION    Medications prior to admission:   Prior to Admission medications    Medication Sig Start Date End Date Taking? Authorizing Provider   lisinopril (PRINIVIL;ZESTRIL) 40 MG tablet Take 40 mg by mouth 2 times daily Indications: High Blood Pressure Disorder   Yes Historical Provider, MD   levothyroxine (SYNTHROID) 75 MCG tablet Take 75 mcg by mouth Daily Indications: Underactive Thyroid   Yes Historical Provider, MD   cetirizine (ZYRTEC) 10 MG tablet Take 10 mg by mouth as needed for Allergies    Yes Historical Provider, MD   diazePAM (VALIUM) 2 MG tablet Take 2 mg by mouth as needed for Anxiety.     Yes Historical Provider, MD   fluticasone (FLONASE) 50 MCG/ACT nasal spray 2 sprays by Nasal route daily as needed for Rhinitis  3/22/21  Yes Historical Provider, MD   Insulin Pen Needle (B-D ULTRAFINE III SHORT PEN) 31G X 8 MM MISC USE AS DIRECTED TO INJECT FOUR TIMES DAILY 10/21/20  Yes Historical Provider, MD   Insulin Syringe-Needle U-100 (BD INSULIN SYRINGE U/F) 31G X 5/16\" 1 ML MISC AS DIRECTED FOUR TIMES A DAY 10/27/20  Yes Historical Provider, MD   FreeStyle Lancets MISC Use as instructed 4 x daily 10/21/20  Yes Historical Provider, MD   Omega-3 Fatty Acids (FISH OIL) 1000 MG CAPS Take 1,000 mg by mouth Daily with supper    Yes Historical Provider, MD   potassium gluconate 550 mg tablet Take 550 mg by mouth daily    Yes Historical Provider, MD   lamoTRIgine (LAMICTAL) 100 MG tablet Take 100 mg by mouth daily Indications: 1/2 tablet with dinner   Yes Historical Provider, MD   amLODIPine (NORVASC) 5 MG tablet Take 10 mg by mouth daily WIT LUNCH   Yes Historical Provider, MD   allopurinol (ZYLOPRIM) 100 MG tablet Take 100 mg by mouth 2 times daily   Yes Historical Provider, MD   Ergocalciferol (VITAMIN D2 PO) Take 50,000 Units by mouth three times a week    Yes Historical Provider, MD   Multiple Vitamins-Minerals (COMPLETE MULTIVITAMIN/MINERAL PO) Take 1 tablet by mouth daily    Yes Historical Provider, MD   citalopram (CELEXA) 40 MG tablet Take 40 mg by mouth daily   Yes Historical Provider, MD   rosuvastatin (CRESTOR) 10 MG tablet Take 10 mg by mouth daily   Yes Historical Provider, MD   carvedilol (COREG) 3.125 MG tablet Take 3.125 mg by mouth 2 times daily (with meals)   Yes Historical Provider, MD   sodium bicarbonate 650 MG tablet Take 650 mg by mouth 3 times daily    Yes Historical Provider, MD   busPIRone (BUSPAR) 10 MG tablet Take 20 mg by mouth 2 times daily   Yes Historical Provider, MD   aspirin 81 MG tablet Take 81 mg by mouth daily With Dinner   Yes Historical Provider, MD   Insulin Glargine (LANTUS SC) Inject 90 Units into the skin daily With Evening Meals   Yes Historical Provider, MD   Evolocumab 140 MG/ML SOAJ Inject 140 mg into the skin every 14 days 10/21/20 9/9/21  Historical Provider, MD   insulin lispro (HUMALOG KWIKPEN) 200 UNIT/ML SOPN pen Inject 25-80 Units into the skin 3 times daily (with meals)  10/21/20   Historical Provider, MD   epoetin jose ramon (EPOGEN;PROCRIT) 03944 UNIT/ML injection Inject 10,000 Units into the skin every 30 days     Historical Provider, MD   Dulaglutide 1.5 MG/0.5ML SOPN Inject 0.75 mg into the skin once a week     Historical Provider, MD   calcitRIOL (ROCALTROL) 0.25 MCG capsule Take 0.25 mcg by mouth every other day    Historical Provider, MD       Current medications:    Current Facility-Administered Medications   Medication Dose Route Frequency Provider Last Rate Last Admin    0.45 % sodium chloride infusion   Intravenous Continuous Pari Heading, DO        0.9 % sodium chloride infusion  25 mL Intravenous PRN JAMES Perrin        sodium chloride flush 0.9 % injection 10 mL  10 mL Intravenous 2 times per day JAMES Nichols        sodium chloride flush 0.9 % injection 10 mL  10 mL Intravenous PRN JAMES Nichols        vancomycin (VANCOCIN) 1000 mg in dextrose 5% 250 mL IVPB  1,000 mg Intravenous On Call to [facility], APRN        aspirin EC tablet 81 mg  81 mg Oral Once JAMES Nichols           Allergies:     Allergies   Allergen Reactions    Codeine Nausea And Vomiting     N/v/felt hot       Problem List:    Patient Active Problem List   Diagnosis Code    Morbid obesity ([facility] E66.01    Thyroid disease E07.9    Sleep apnea G47.30    Mood disorder ([facility] F39    Hypertension associated with diabetes ([facility] E11.59, I15.2    Anemia of chronic renal failure N18.9, D63.1    Type 2 DM with CKD stage 3 and hypertension (HCC) E11.22, I12.9, N18.30    Hypertriglyceridemia E78.1    Anxiety F41.9    Embolism (HCC) I74.9    Vitamin D deficiency E55.9    Anemia D64.9    Hypocalcemia E83.51    Allergic rhinitis with postnasal drip J30.9, R09.82    B12 deficiency E53.8    Deep vein thrombosis of lower extremity (HCC) I82.409    Disease of liver K76.9    Mixed diabetic hyperlipidemia associated with type 2 diabetes mellitus (HCC) E11.69, E78.2    Stage 4 chronic kidney disease (HCC) N18.4       Past Medical History:        Diagnosis Date    Anxiety     CKD (chronic kidney disease)     stage 4    Colon polyp     Depression     Embolism - blood clot 2004    Hyperlipidemia     Hypertension     Obesity     Sleep apnea     Thyroid disease     Type II or unspecified type diabetes mellitus without mention of complication, not stated as uncontrolled        Past Surgical History:        Procedure Laterality Date    CHOLECYSTECTOMY      DILATION AND CURETTAGE OF UTERUS      HYSTERECTOMY  10/17/2018    POLYPECTOMY      RECTAL SURGERY      fistula repair    TONSILLECTOMY AND ADENOIDECTOMY         Social History:    Social History Tobacco Use    Smoking status: Never Smoker    Smokeless tobacco: Never Used   Substance Use Topics    Alcohol use: No                                Counseling given: Not Answered      Vital Signs (Current):   Vitals:    06/18/21 0618   BP: (!) 203/68   Pulse: 79   Resp: 16   Temp: 97.7 °F (36.5 °C)   TempSrc: Temporal   SpO2: 97%   Weight: (!) 390 lb 8 oz (177.1 kg)   Height: 5' 5\" (1.651 m)                                              BP Readings from Last 3 Encounters:   06/18/21 (!) 203/68   05/19/21 (!) 134/92   07/12/17 139/76       NPO Status:                                                                                 BMI:   Wt Readings from Last 3 Encounters:   06/18/21 (!) 390 lb 8 oz (177.1 kg)   06/16/21 (!) 383 lb (173.7 kg)   05/19/21 (!) 383 lb (173.7 kg)     Body mass index is 64.98 kg/m². CBC:   Lab Results   Component Value Date    WBC 8.4 06/16/2021    RBC 3.42 06/16/2021    HGB 10.2 06/16/2021    HCT 32.3 06/16/2021    HCT 30.9 05/23/2012    MCV 94.4 06/16/2021    RDW 15.3 06/16/2021     06/16/2021     05/23/2012       CMP:   Lab Results   Component Value Date     06/16/2021     05/23/2012    K 4.0 06/16/2021    K 4.4 05/23/2012     06/16/2021     05/23/2012    CO2 21 06/16/2021    BUN 34 06/16/2021    CREATININE 3.2 06/16/2021    CREATININE 1.9 05/23/2012    GFRAA 18 06/16/2021    LABGLOM 15 06/16/2021    GLUCOSE 81 06/16/2021    PROT 6.5 07/12/2017    PROT 6.8 01/09/2013    CALCIUM 8.6 06/16/2021    BILITOT <0.2 07/12/2017    ALKPHOS 65 07/12/2017    ALKPHOS 74 10/07/2011    AST 19 07/12/2017    ALT 13 07/12/2017       POC Tests: No results for input(s): POCGLU, POCNA, POCK, POCCL, POCBUN, POCHEMO, POCHCT in the last 72 hours.     Coags:   Lab Results   Component Value Date    PROTIME 13.5 06/16/2021    PROTIME 16.08 02/29/2012    INR 1.04 06/16/2021    APTT 28.7 06/16/2021       HCG (If Applicable): No results found for: Yessica Perea, HCG, HCGQUANT     ABGs: No results found for: PHART, PO2ART, JGU6DXO, UKC7HTK, BEART, S8PKHCKZ     Type & Screen (If Applicable):  No results found for: LABABO, LABRH    Drug/Infectious Status (If Applicable):  No results found for: HIV, HEPCAB    COVID-19 Screening (If Applicable): No results found for: COVID19        Anesthesia Evaluation  Patient summary reviewed and Nursing notes reviewed   history of anesthetic complications: PONV. Airway: Mallampati: IV  TM distance: >3 FB   Neck ROM: full  Mouth opening: > = 3 FB Dental: normal exam         Pulmonary:   (+) shortness of breath: chronic,  sleep apnea: on CPAP,      (-) asthma and not a current smoker                          ROS comment: CXR:  Impression  1. No focal infiltrate or effusion. 2. Cardiomegaly. Cardiovascular:  Exercise tolerance: no interval change,   (+) hypertension:, hyperlipidemia    (-) pacemaker, past MI, CAD, CABG/stent, dysrhythmias and  angina    ECG reviewed               Beta Blocker:  Dose within 24 Hrs      ROS comment: EKG;  57 BPM  Sinus rhythm  Prolonged QT interval  Poor R wave progression - probable normal variant  Low QRS voltages in precordial leads  Comparison Summary: Descriptive differences only  Summary: Borderline ECG     Neuro/Psych:   (+) psychiatric history:   (-) seizures and CVA           GI/Hepatic/Renal:   (+) GERD: well controlled, renal disease: CRI, morbid obesity          Endo/Other:    (+) DiabetesType II DM, well controlled, using insulin, blood dyscrasia: anemia:., no malignancy/cancer. (-) no malignancy/cancer               Abdominal:           Vascular:   + DVT, . Anesthesia Plan      general and regional     ASA 3     (Supraclavicular block.)  Induction: intravenous. MIPS: Postoperative opioids intended and Prophylactic antiemetics administered. Anesthetic plan and risks discussed with patient.                       Cornelia Obando,    6/18/2021

## 2021-06-18 NOTE — ANESTHESIA PROCEDURE NOTES
Peripheral Block    Patient location during procedure: holding area  Start time: 6/18/2021 7:05 AM  End time: 6/18/2021 7:05 AM  Staffing  Performed: anesthesiologist   Anesthesiologist: Regino Dykes DO  Preanesthetic Checklist  Completed: patient identified, IV checked, site marked, risks and benefits discussed, surgical consent, monitors and equipment checked, pre-op evaluation, timeout performed, anesthesia consent given, oxygen available and patient being monitored  Peripheral Block  Patient position: supine  Prep: ChloraPrep  Patient monitoring: cardiac monitor, continuous pulse ox, frequent blood pressure checks and IV access  Block type: Brachial plexus  Laterality: left  Injection technique: single-shot  Guidance: ultrasound guided  Local infiltration: lidocaine  Infiltration strength: 2 %  Dose: 15 mL  Interscalene  Provider prep: sterile gloves and mask  Local infiltration: lidocaine  Needle  Needle type: combined needle/nerve stimulator   Needle gauge: 22 G  Needle length: 5 cm  Needle localization: ultrasound guidance  Test dose: negative  Assessment  Injection assessment: negative aspiration for heme, no paresthesia on injection and local visualized surrounding nerve on ultrasound  Paresthesia pain: none  Slow fractionated injection: yes  Hemodynamics: stable  Additional Notes  Normal appearing plexus without evidence of pathology.   Medications Administered  Ropivacaine (NAROPIN) injection 0.5%, 15 mL  Reason for block: post-op pain management

## 2021-06-18 NOTE — OP NOTE
Preoperative Diagnosis:  Chronic Kidney Disease Stage IV    Postoperative Diagnosis:  Same    Operative Procedure:   1. Left Brachial Artery to Cephalic Vein Arteriovenous Fistula Creation    Surgeon:  Eugene Florentino M.D. Anesthesia:  Left upper extremity block plus general    Estimated Blood Loss:  Less than 50 ml    Findings:  1. The artery has mild plaque and is around 5 mm in diameter  2. The vein is around 4 mm in diameter. Procedure in detail:    After the patient was consented, a block performed by anesthesia, and IV antibiotics administered, she was brought to the operating room and placed on the operating room table supine position. Her arm was then prepped and draped in the usual sterile procedure. The veins and arteries were interrogated with ultrasound intraoperatively and a decision was made to perform the above procedure based on preoperative vein mapping and this intraoperative ultrasound. A transverse incision was made in the proximal forearm with knife and subcutaneous tissues were dissected with bovie electrocautery. Sharp dissection was used to expose and circumferentially dissect the vein and branches. The cephalic vein was marked for orientation purposes. The artery was then dissected proximally and distally and vesseloops placed for future vascular control. The patient was given intravenous heparin. After adequate heparinization time, the branches off of the cephalic vein are ligated and a bulldog clamp is placed proximally for vascular control. Two of the branches are then transected and connected with Gomez scissors to create a nice yang for anastomosis. The proximal and distal vesseloops were tightened and a longitudinal ateriotomy made with 11 blade and Gomez scissors. An end vein to side artery anastomosis is then created with 6-0 prolene running suture.   Prior to completing the anastomosis, standard flushing techniques were utilized to remove any air and debris from the native vessels. Hemostasis was excellent and there is a nicely palpable thrill in the fistula. The wound is closed in layers with 3-0 PDS subcutaneous sutures, 4-0 monocryl subcuticular running suture and dermabond skin adhesive. She tolerated the procedure well and was brought to the recovery room in good condition.

## 2021-06-18 NOTE — PROGRESS NOTES
AV FISTULA POSTOPERATIVE INSTRUCTIONS DISCUSSED AND SENT COPY WITH PATIENT AND FAMILY. COPY IN CHART.     Electronically signed by Moy Amin RN on 6/18/2021 at 9:55 AM

## 2021-06-21 ENCOUNTER — LAB (OUTPATIENT)
Dept: LAB | Facility: HOSPITAL | Age: 61
End: 2021-06-21

## 2021-06-21 ENCOUNTER — DOCUMENTATION (OUTPATIENT)
Dept: ENDOCRINOLOGY | Facility: CLINIC | Age: 61
End: 2021-06-21

## 2021-06-21 LAB — SARS-COV-2 ORF1AB RESP QL NAA+PROBE: NOT DETECTED

## 2021-06-21 PROCEDURE — C9803 HOPD COVID-19 SPEC COLLECT: HCPCS | Performed by: INTERNAL MEDICINE

## 2021-06-21 PROCEDURE — U0004 COV-19 TEST NON-CDC HGH THRU: HCPCS | Performed by: INTERNAL MEDICINE

## 2021-06-21 PROCEDURE — U0005 INFEC AGEN DETEC AMPLI PROBE: HCPCS | Performed by: INTERNAL MEDICINE

## 2021-06-23 ENCOUNTER — HOSPITAL ENCOUNTER (OUTPATIENT)
Dept: CT IMAGING | Facility: HOSPITAL | Age: 61
Discharge: HOME | End: 2021-06-23
Admitting: INTERNAL MEDICINE

## 2021-06-23 VITALS
HEART RATE: 66 BPM | RESPIRATION RATE: 16 BRPM | TEMPERATURE: 96.9 F | OXYGEN SATURATION: 94 % | SYSTOLIC BLOOD PRESSURE: 119 MMHG | DIASTOLIC BLOOD PRESSURE: 54 MMHG | WEIGHT: 293 LBS | HEIGHT: 65 IN | BODY MASS INDEX: 48.82 KG/M2

## 2021-06-23 DIAGNOSIS — D75.89 BONE MARROW SUPPRESSION: ICD-10-CM

## 2021-06-23 LAB
APTT PPP: 27.6 SECONDS (ref 24.1–35)
BASOPHILS # BLD AUTO: 0.05 10*3/MM3 (ref 0–0.2)
BASOPHILS NFR BLD AUTO: 0.5 % (ref 0–1.5)
DEPRECATED RDW RBC AUTO: 52.8 FL (ref 37–54)
EOSINOPHIL # BLD AUTO: 0.39 10*3/MM3 (ref 0–0.4)
EOSINOPHIL NFR BLD AUTO: 3.5 % (ref 0.3–6.2)
ERYTHROCYTE [DISTWIDTH] IN BLOOD BY AUTOMATED COUNT: 15.6 % (ref 12.3–15.4)
HCT VFR BLD AUTO: 32.1 % (ref 34–46.6)
HGB BLD-MCNC: 10.3 G/DL (ref 12–15.9)
IMM GRANULOCYTES # BLD AUTO: 0.09 10*3/MM3 (ref 0–0.05)
IMM GRANULOCYTES NFR BLD AUTO: 0.8 % (ref 0–0.5)
INR PPP: 1.01 (ref 0.91–1.09)
LYMPHOCYTES # BLD AUTO: 2.18 10*3/MM3 (ref 0.7–3.1)
LYMPHOCYTES NFR BLD AUTO: 19.8 % (ref 19.6–45.3)
MCH RBC QN AUTO: 29.8 PG (ref 26.6–33)
MCHC RBC AUTO-ENTMCNC: 32.1 G/DL (ref 31.5–35.7)
MCV RBC AUTO: 92.8 FL (ref 79–97)
MONOCYTES # BLD AUTO: 0.72 10*3/MM3 (ref 0.1–0.9)
MONOCYTES NFR BLD AUTO: 6.5 % (ref 5–12)
NEUTROPHILS NFR BLD AUTO: 68.9 % (ref 42.7–76)
NEUTROPHILS NFR BLD AUTO: 7.59 10*3/MM3 (ref 1.7–7)
NRBC BLD AUTO-RTO: 0 /100 WBC (ref 0–0.2)
PLATELET # BLD AUTO: 211 10*3/MM3 (ref 140–450)
PMV BLD AUTO: 10.2 FL (ref 6–12)
PROTHROMBIN TIME: 12.5 SECONDS (ref 11.5–13.4)
RBC # BLD AUTO: 3.46 10*6/MM3 (ref 3.77–5.28)
WBC # BLD AUTO: 11.02 10*3/MM3 (ref 3.4–10.8)

## 2021-06-23 PROCEDURE — 77012 CT SCAN FOR NEEDLE BIOPSY: CPT

## 2021-06-23 PROCEDURE — 85025 COMPLETE CBC W/AUTO DIFF WBC: CPT | Performed by: RADIOLOGY

## 2021-06-23 PROCEDURE — 25010000003 LIDOCAINE 1 % SOLUTION: Performed by: RADIOLOGY

## 2021-06-23 PROCEDURE — 88305 TISSUE EXAM BY PATHOLOGIST: CPT

## 2021-06-23 PROCEDURE — 85730 THROMBOPLASTIN TIME PARTIAL: CPT | Performed by: RADIOLOGY

## 2021-06-23 PROCEDURE — 88271 CYTOGENETICS DNA PROBE: CPT

## 2021-06-23 PROCEDURE — 88184 FLOWCYTOMETRY/ TC 1 MARKER: CPT | Performed by: INTERNAL MEDICINE

## 2021-06-23 PROCEDURE — 88313 SPECIAL STAINS GROUP 2: CPT

## 2021-06-23 PROCEDURE — 88341 IMHCHEM/IMCYTCHM EA ADD ANTB: CPT

## 2021-06-23 PROCEDURE — 85610 PROTHROMBIN TIME: CPT | Performed by: RADIOLOGY

## 2021-06-23 PROCEDURE — 88237 TISSUE CULTURE BONE MARROW: CPT | Performed by: INTERNAL MEDICINE

## 2021-06-23 PROCEDURE — 88305 TISSUE EXAM BY PATHOLOGIST: CPT | Performed by: INTERNAL MEDICINE

## 2021-06-23 PROCEDURE — 88264 CHROMOSOME ANALYSIS 20-25: CPT | Performed by: INTERNAL MEDICINE

## 2021-06-23 PROCEDURE — 88313 SPECIAL STAINS GROUP 2: CPT | Performed by: INTERNAL MEDICINE

## 2021-06-23 PROCEDURE — 25010000002 MIDAZOLAM PER 1 MG: Performed by: RADIOLOGY

## 2021-06-23 PROCEDURE — 25010000002 FENTANYL CITRATE (PF) 50 MCG/ML SOLUTION: Performed by: RADIOLOGY

## 2021-06-23 PROCEDURE — 88185 FLOWCYTOMETRY/TC ADD-ON: CPT | Performed by: INTERNAL MEDICINE

## 2021-06-23 PROCEDURE — 88342 IMHCHEM/IMCYTCHM 1ST ANTB: CPT

## 2021-06-23 PROCEDURE — 88311 DECALCIFY TISSUE: CPT | Performed by: INTERNAL MEDICINE

## 2021-06-23 RX ORDER — LIDOCAINE HYDROCHLORIDE 10 MG/ML
INJECTION, SOLUTION INFILTRATION; PERINEURAL
Status: COMPLETED | OUTPATIENT
Start: 2021-06-23 | End: 2021-06-23

## 2021-06-23 RX ORDER — FENTANYL CITRATE 50 UG/ML
INJECTION, SOLUTION INTRAMUSCULAR; INTRAVENOUS
Status: COMPLETED | OUTPATIENT
Start: 2021-06-23 | End: 2021-06-23

## 2021-06-23 RX ORDER — SODIUM CHLORIDE 0.9 % (FLUSH) 0.9 %
10 SYRINGE (ML) INJECTION AS NEEDED
Status: DISCONTINUED | OUTPATIENT
Start: 2021-06-23 | End: 2021-06-24 | Stop reason: HOSPADM

## 2021-06-23 RX ORDER — MIDAZOLAM HYDROCHLORIDE 1 MG/ML
INJECTION INTRAMUSCULAR; INTRAVENOUS
Status: COMPLETED | OUTPATIENT
Start: 2021-06-23 | End: 2021-06-23

## 2021-06-23 RX ORDER — SODIUM CHLORIDE 0.9 % (FLUSH) 0.9 %
3 SYRINGE (ML) INJECTION EVERY 12 HOURS SCHEDULED
Status: DISCONTINUED | OUTPATIENT
Start: 2021-06-23 | End: 2021-06-24 | Stop reason: HOSPADM

## 2021-06-23 RX ADMIN — MIDAZOLAM 0.5 MG: 1 INJECTION INTRAMUSCULAR; INTRAVENOUS at 09:52

## 2021-06-23 RX ADMIN — LIDOCAINE HYDROCHLORIDE 10 ML: 10 INJECTION, SOLUTION INFILTRATION; PERINEURAL at 09:47

## 2021-06-23 RX ADMIN — FENTANYL CITRATE 50 MCG: 50 INJECTION, SOLUTION INTRAMUSCULAR; INTRAVENOUS at 09:46

## 2021-06-23 RX ADMIN — FENTANYL CITRATE 25 MCG: 50 INJECTION, SOLUTION INTRAMUSCULAR; INTRAVENOUS at 09:52

## 2021-06-23 RX ADMIN — MIDAZOLAM 1.5 MG: 1 INJECTION INTRAMUSCULAR; INTRAVENOUS at 09:46

## 2021-06-23 NOTE — INTERVAL H&P NOTE
H&P reviewed. The patient was examined and there are no changes to the H&P.  Risks, alternatives and benefits explained to the patient. All questions were answered prior to the exam. Second assessment done prior to sedation.  Plan is for moderate sedation.

## 2021-06-30 ENCOUNTER — LAB (OUTPATIENT)
Dept: LAB | Facility: HOSPITAL | Age: 61
End: 2021-06-30

## 2021-06-30 ENCOUNTER — OFFICE VISIT (OUTPATIENT)
Dept: ONCOLOGY | Facility: CLINIC | Age: 61
End: 2021-06-30

## 2021-06-30 VITALS
WEIGHT: 293 LBS | HEART RATE: 71 BPM | OXYGEN SATURATION: 98 % | SYSTOLIC BLOOD PRESSURE: 128 MMHG | HEIGHT: 65 IN | RESPIRATION RATE: 16 BRPM | TEMPERATURE: 97.8 F | BODY MASS INDEX: 48.82 KG/M2 | DIASTOLIC BLOOD PRESSURE: 60 MMHG

## 2021-06-30 DIAGNOSIS — D64.9 ANEMIA, UNSPECIFIED TYPE: ICD-10-CM

## 2021-06-30 DIAGNOSIS — D63.1 ANEMIA DUE TO CHRONIC KIDNEY DISEASE, UNSPECIFIED CKD STAGE: Primary | ICD-10-CM

## 2021-06-30 DIAGNOSIS — N18.4 CHRONIC KIDNEY DISEASE, STAGE 4 (SEVERE) (HCC): ICD-10-CM

## 2021-06-30 DIAGNOSIS — D63.8 ANEMIA, CHRONIC DISEASE: Primary | ICD-10-CM

## 2021-06-30 DIAGNOSIS — D50.0 IRON DEFICIENCY ANEMIA DUE TO CHRONIC BLOOD LOSS: ICD-10-CM

## 2021-06-30 DIAGNOSIS — D75.89 BONE MARROW SUPPRESSION: ICD-10-CM

## 2021-06-30 DIAGNOSIS — N18.32 ANEMIA OF CHRONIC RENAL FAILURE, STAGE 3B (HCC): ICD-10-CM

## 2021-06-30 DIAGNOSIS — R79.1 ABNORMAL COAGULATION PROFILE: ICD-10-CM

## 2021-06-30 DIAGNOSIS — N18.4 STAGE 4 CHRONIC KIDNEY DISEASE (HCC): ICD-10-CM

## 2021-06-30 DIAGNOSIS — E66.9 OBESITY, UNSPECIFIED CLASSIFICATION, UNSPECIFIED OBESITY TYPE, UNSPECIFIED WHETHER SERIOUS COMORBIDITY PRESENT: ICD-10-CM

## 2021-06-30 DIAGNOSIS — N18.30 STAGE 3 CHRONIC KIDNEY DISEASE, UNSPECIFIED WHETHER STAGE 3A OR 3B CKD (HCC): ICD-10-CM

## 2021-06-30 DIAGNOSIS — D63.1 ANEMIA OF CHRONIC RENAL FAILURE, STAGE 3B (HCC): ICD-10-CM

## 2021-06-30 DIAGNOSIS — N18.9 ANEMIA DUE TO CHRONIC KIDNEY DISEASE, UNSPECIFIED CKD STAGE: Primary | ICD-10-CM

## 2021-06-30 DIAGNOSIS — E66.01 CLASS 3 SEVERE OBESITY DUE TO EXCESS CALORIES IN ADULT, UNSPECIFIED BMI, UNSPECIFIED WHETHER SERIOUS COMORBIDITY PRESENT (HCC): ICD-10-CM

## 2021-06-30 PROBLEM — E66.813 CLASS 3 SEVERE OBESITY DUE TO EXCESS CALORIES IN ADULT: Status: ACTIVE | Noted: 2021-06-30

## 2021-06-30 LAB
ALBUMIN SERPL-MCNC: 2.8 G/DL (ref 3.5–5.2)
ALBUMIN/GLOB SERPL: 0.9 G/DL
ALP SERPL-CCNC: 80 U/L (ref 39–117)
ALT SERPL W P-5'-P-CCNC: 21 U/L (ref 1–33)
ANION GAP SERPL CALCULATED.3IONS-SCNC: 10 MMOL/L (ref 5–15)
AST SERPL-CCNC: 19 U/L (ref 1–32)
BASOPHILS # BLD AUTO: 0.04 10*3/MM3 (ref 0–0.2)
BASOPHILS NFR BLD AUTO: 0.4 % (ref 0–1.5)
BILIRUB SERPL-MCNC: 0.2 MG/DL (ref 0–1.2)
BUN SERPL-MCNC: 45 MG/DL (ref 8–23)
BUN/CREAT SERPL: 15.4 (ref 7–25)
CALCIUM SPEC-SCNC: 8.4 MG/DL (ref 8.6–10.5)
CHLORIDE SERPL-SCNC: 112 MMOL/L (ref 98–107)
CO2 SERPL-SCNC: 22 MMOL/L (ref 22–29)
CREAT SERPL-MCNC: 2.92 MG/DL (ref 0.57–1)
DEPRECATED RDW RBC AUTO: 54.1 FL (ref 37–54)
EOSINOPHIL # BLD AUTO: 0.14 10*3/MM3 (ref 0–0.4)
EOSINOPHIL NFR BLD AUTO: 1.3 % (ref 0.3–6.2)
ERYTHROCYTE [DISTWIDTH] IN BLOOD BY AUTOMATED COUNT: 15.6 % (ref 12.3–15.4)
ERYTHROCYTE [SEDIMENTATION RATE] IN BLOOD: 45 MM/HR (ref 0–20)
FERRITIN SERPL-MCNC: 423.1 NG/ML (ref 13–150)
GFR SERPL CREATININE-BSD FRML MDRD: 16 ML/MIN/1.73
GLOBULIN UR ELPH-MCNC: 3 GM/DL
GLUCOSE SERPL-MCNC: 130 MG/DL (ref 65–99)
HCT VFR BLD AUTO: 32.9 % (ref 34–46.6)
HGB BLD-MCNC: 10.4 G/DL (ref 12–15.9)
HOLD SPECIMEN: NORMAL
IMM GRANULOCYTES # BLD AUTO: 0.05 10*3/MM3 (ref 0–0.05)
IMM GRANULOCYTES NFR BLD AUTO: 0.5 % (ref 0–0.5)
LYMPHOCYTES # BLD AUTO: 2.1 10*3/MM3 (ref 0.7–3.1)
LYMPHOCYTES NFR BLD AUTO: 19.2 % (ref 19.6–45.3)
MCH RBC QN AUTO: 30.1 PG (ref 26.6–33)
MCHC RBC AUTO-ENTMCNC: 31.6 G/DL (ref 31.5–35.7)
MCV RBC AUTO: 95.1 FL (ref 79–97)
MONOCYTES # BLD AUTO: 0.58 10*3/MM3 (ref 0.1–0.9)
MONOCYTES NFR BLD AUTO: 5.3 % (ref 5–12)
NEUTROPHILS NFR BLD AUTO: 73.3 % (ref 42.7–76)
NEUTROPHILS NFR BLD AUTO: 8 10*3/MM3 (ref 1.7–7)
NRBC BLD AUTO-RTO: 0 /100 WBC (ref 0–0.2)
PLATELET # BLD AUTO: 190 10*3/MM3 (ref 140–450)
PMV BLD AUTO: 10.3 FL (ref 6–12)
POTASSIUM SERPL-SCNC: 4.1 MMOL/L (ref 3.5–5.2)
PROT SERPL-MCNC: 5.8 G/DL (ref 6–8.5)
RBC # BLD AUTO: 3.46 10*6/MM3 (ref 3.77–5.28)
SODIUM SERPL-SCNC: 144 MMOL/L (ref 136–145)
WBC # BLD AUTO: 10.91 10*3/MM3 (ref 3.4–10.8)

## 2021-06-30 PROCEDURE — 82728 ASSAY OF FERRITIN: CPT

## 2021-06-30 PROCEDURE — 36415 COLL VENOUS BLD VENIPUNCTURE: CPT

## 2021-06-30 PROCEDURE — 99214 OFFICE O/P EST MOD 30 MIN: CPT | Performed by: INTERNAL MEDICINE

## 2021-06-30 PROCEDURE — 85025 COMPLETE CBC W/AUTO DIFF WBC: CPT

## 2021-06-30 PROCEDURE — 85651 RBC SED RATE NONAUTOMATED: CPT

## 2021-06-30 PROCEDURE — 80053 COMPREHEN METABOLIC PANEL: CPT

## 2021-06-30 RX ORDER — DEXAMETHASONE 1 MG
1 TABLET ORAL DAILY
Qty: 14 TABLET | Refills: 2 | Status: SHIPPED | OUTPATIENT
Start: 2021-06-30 | End: 2021-07-14

## 2021-06-30 NOTE — PROGRESS NOTES
MGW ONC [facility] GROUP HEMATOLOGY AND ONCOLOGY  [address]  [facility] [address] [phone]    Chief Complaint  Bone marrow suppression (bm bx 06/23/2021,)    REASON FOR VISIT: Maria C Shrestha is a 60-year-old female here for follow-up of anemia due to chronic kidney disease stage IV.  She sees nephrologist Dr. Davis.  She has been receiving Procrit when she meets guidelines.  Her last Procrit injection was on 12/21/2020.  She has also had iron infusions in the past with the last infusions of Injectafer being on 7/17/2020 and 7/31/2020.     At last appointment patient had stated that Dr. Davis told her in December that he was going to get her ready for dialysis.  Today she states she has an appointment with Dr. Davis on April 7, and at that time they will discuss possible dialysis.     Patient states she is still having the same symptoms as she was experiencing at her last office visit which is extreme fatigue, generalized weakness, sinus congestion and allergies.  No sinus pressure upon physical examination.  Patient states she is taking Zyrtec and using Flonase twice daily.  She states her throat feels swollen in the back especially in the morning after using CPAP all night.  She states that the reservoir is dry by morning.  Patient has not seen her primary care physician Dr. Soliman regarding this issue.  Encouraged patient to make appointment to be seen by Dr. Soliman.  At last appointment diabetes management was discussed with patient.  She states she has been trying to make small changes in her diet since her last visit.  Still dealing with depression and problems managing her diabetes.  Patient has lost 6 pounds since last office visit which she states that she believes is fluid loss.  She does have lower extremity edema that is nonpitting.  She is still using a wheelchair to move around as she is not able to walk more than a few feet.  She  "denies abdominal pain, fever, chills, night sweats, diarrhea, constipation, nausea/vomiting.     Labs today include hemoglobin 10.3, hematocrit 32.3, GFR 19, iron saturation 22, ferritin 466.10, and platelets 212,000.  Patient meets guidelines to receive Procrit today.     On follow up 3/17/2021: Patient last received Procrit on 2/17/2021. Patient is seeing ENT next month for throat issues. States she has gained 10 pounds since last visit, and is holding a lot fluid.  Lung sounds are clear, normal heart sounds heard, and she does have 1+ pitting edema in her lower extremities bilaterally.  She is scheduled to see nephrologist Dr. Davis next week.  She states she still has fatigue specially upon walking short distances, and still has shortness of breath with exertion.  Denies chills, fever, nausea/vomiting.     In early visit,  her labs shared with patient in office.  Hemoglobin 9.8, hematocrit 29.8, platelets 186,000, blood glucose 207, iron saturation 16, ferritin 322.40.  As patient's iron saturation is less than 20 patient is not eligible for Procrit today.  Patient is to receive 1 dose of Injectafer 750 mg today.  Patient has had Injectafer in the past as she was unable to tolerate oral iron pills.  Will reassess labs in 1 month with possible Procrit injection.        On follow up 4/14/2021: Patient presents feeling \"okay, but tired\". She complains of bilateral edema in lower extremities and fatigue. She denies fever, chills, nausea, vomiting, weight loss.   She states she has been fluctuating 10 pounds due to fluid build up. Left leg swells larger than right.  Edema decreases after elevating legs.  Recently met with nephrologist Dr. Davis, and he states they are still working towards dialysis. Discussed with patient to decrease salt intake including canned foods.  Patient encouraged to continue drinking water and continue decreasing soda intake.  Continue elevating legs.  Encouraged patient to discuss " "with her pharmacy her options of possibly special ordering compression stockings to help with her lower extremity edema.  Pitting edema present bilaterally in lower extremities.     Her labs today do meet the guidelines to receive Procrit.  Labs shared with patient in office.  Her hemoglobin has increased to 10, hematocrit 30.2, GFR decreased to 15, iron saturation 21, ferritin high at 538.70 (patient had Injectafer infusion 1 month ago).     She had IV FE and with this and procrit it has increased to 11 ranges       Component   Ref Range & Units 12 d ago   [place] 2 wk [place]   [place] 1 mo [place]   [place] 2 mo ago   [facility]/[place]   WBC   4.8 - 10.8 K/uL 10.9High   8.4  8.29 R  9.35 R    RBC   4.20 - 5.40 M/uL 3.78Low   3.42Low   3.62Low  R  3.32Low  R    Hemoglobin   12.0 - 16.0 g/dL 11.4Low   10.2Low   10.8Low  R  10.0Low  R    Hematocrit   37.0 - 47.0 % 35.3Low   32.3Low   33.3Low  R  30.2Low  R    MCV   81.0 - 99.0 fL 93.4  94.4  92.0 R  91.0 R    MCH   27.0 - 31.0 pg 30.2  29.8  29.8 R  30.1 R    MCHC   33.0 - 37.0 g/dL 32.3Low   31.6Low   32.4 R  33.1 R    RDW   11.5 - 14.5 % 15.5High   15.3High   15.2 R  14.8 R    Platelets   130 - 400 K/uL 223  203  219 R  209 R                Oncology/Hematology History    No history exists.         PAST MEDICAL HISTORY:  ALLERGIES:  Allergies   Allergen Reactions   • Codeine Nausea Only and Nausea And Vomiting     N/v/felt hot     CURRENT MEDICATIONS:  Outpatient Encounter Medications as of 6/30/2021   Medication Sig Dispense Refill   • allopurinol (ZYLOPRIM) 100 MG tablet Take 100 mg by mouth 2 (Two) Times a Day.     • amLODIPine (NORVASC) 10 MG tablet Take 10 mg by mouth.     • aspirin 81 MG tablet Take 81 mg by mouth Daily. Stop 7/22/2018     • azelastine (ASTELIN) 0.1 % nasal spray 2 sprays into the nostril(s) as directed by provider 2 (Two) Times a Day. Use in each nostril as directed 30 mL 11   • BD Insulin Syringe U/F 31G X 5/16\" 1 ML misc AS " DIRECTED FOUR TIMES A  each 1   • busPIRone (BUSPAR) 10 MG tablet TAKE 2 TABLETS TWICE A DAY AS NEEDED 360 tablet 3   • carvedilol (COREG) 12.5 MG tablet Take 12.5 mg by mouth 2 (Two) Times a Day With Meals.     • cetirizine (zyrTEC) 10 MG tablet Take 10 mg by mouth Daily.     • Cholecalciferol (VITAMIN D3) 71660 units tablet Take 1 capsule by mouth 2 (Two) Times a Week.     • citalopram (CeleXA) 40 MG tablet TAKE 1 TABLET DAILY 90 tablet 3   • [] dexamethasone (DECADRON) 1 MG tablet Take 1 tablet by mouth Daily for 14 days. 14 tablet 2   • diazePAM (VALIUM) 2 MG tablet Take 2 mg by mouth As Needed for Anxiety.     • Dulaglutide (Trulicity) 3 MG/0.5ML solution pen-injector Inject 3 mg under the skin into the appropriate area as directed 1 (One) Time Per Week. 12 pen 3   • epoetin elsa (EPOGEN,PROCRIT) 34574 UNIT/ML injection Inject 10,000 Units under the skin into the appropriate area as directed Every 30 (Thirty) Days.     • Evolocumab (REPATHA) solution auto-injector SureClick injection Inject 140 mg under the skin into the appropriate area as directed Every 14 (Fourteen) Days.     • Evolocumab 140 MG/ML solution auto-injector Inject 1 mL under the skin into the appropriate area as directed Every 14 (Fourteen) Days for 24 doses. NDC [address]-06 6 mL 10   • fluticasone (FLONASE) 50 MCG/ACT nasal spray 1 spray into the nostril(s) as directed by provider 2 (Two) Times a Day.     • Glucose Blood (BLOOD GLUCOSE TEST) strip Use 4 x daily, use any brand covered by insurance or same brand as before 360 each 3   • insulin glargine (Lantus) 100 UNIT/ML injection INJECT 200 UNITS DAILY (Patient taking differently: Inject 90 Units under the skin into the appropriate area as directed Daily. INJECT 200 UNITS DAILY) 60 mL 11   • Insulin Lispro (HumaLOG KwikPen) 200 UNIT/ML solution pen-injector Inject 80 Units under the skin into the appropriate area as directed 3 (Three) Times a Day With Meals. 24 pen 11   •  Insulin Pen Needle (B-D ULTRAFINE III SHORT PEN) 31G X 8 MM misc USE AS DIRECTED TO INJECT FOUR TIMES DAILY 200 each 2   • lamoTRIgine (LaMICtal) 100 MG tablet TAKE ONE-HALF (1/2) TABLET DAILY 45 tablet 3   • Lancets (freestyle) lancets Use as instructed 4 x daily 150 each 11   • levothyroxine (SYNTHROID, LEVOTHROID) 88 MCG tablet Take 1 tablet by mouth Daily. 90 tablet 3   • lisinopril (PRINIVIL,ZESTRIL) 40 MG tablet Take 40 mg by mouth 2 (Two) Times a Day.     • Multiple Vitamins-Minerals (MULTIVITAMIN ADULT PO) Take 1 tablet by mouth Daily.     • mupirocin (BACTROBAN) 2 % ointment Apply  topically to the appropriate area as directed 3 (Three) Times a Day. (Patient taking differently: Apply 11 application topically to the appropriate area as directed As Needed.) 22 g 0   • Omega-3 Fatty Acids (FISH OIL) 1000 MG capsule capsule Take 2,000 mg by mouth Daily With Dinner.     • potassium gluconate 595 (99 K) MG tablet tablet potassium gluconate 595 mg (99 mg) tablet   Take 1 tablets every day by oral route.     • rosuvastatin (CRESTOR) 20 MG tablet      • sodium bicarbonate 650 MG tablet Take 1,300 mg by mouth 3 (Three) Times a Day.     • vitamin D (ERGOCALCIFEROL) 1.25 MG (67314 UT) capsule capsule Take 50,000 Units by mouth 1 (One) Time Per Week. 3x weekly     • dexamethasone (DECADRON) 1 MG tablet Take 1 tablet by mouth Daily for 14 days. 14 tablet 2     No facility-administered encounter medications on file as of 6/30/2021.     ADULT ILLNESSES:  Patient Active Problem List   Diagnosis Code   • Type 2 diabetes mellitus with stage 3 chronic kidney disease, with long-term current use of insulin (CMS/[facility]) E11.22, N18.30, Z79.4   • Mixed diabetic hyperlipidemia associated with type 2 diabetes mellitus (CMS/[facility]) E11.69, E78.2   • Hypertension associated with diabetes (CMS/[facility]) E11.59, I15.2   • Vitamin D deficiency E55.9   • B12 deficiency E53.8   • Anemia in CKD (chronic kidney disease) N18.9, D63.1   • Acquired  hypothyroidism E03.9   • Chronic kidney disease, stage 4 (severe) (CMS/HCC) N18.4   • Anemia D64.9   • Anxiety F41.9   • Deep venous thrombosis of lower extremity (CMS/HCC) I82.409   • Disease of liver K76.9   • Embolism (CMS/HCC) I74.9   • Hypertriglyceridemia E78.1   • Hypocalcemia E83.51   • Mood disorder (CMS/[facility]) F39   • Obesity E66.9   • Sleep apnea G47.30   • Acute renal failure superimposed on stage 3 chronic kidney disease (CMS/HCC) N17.9, N18.30   • Anemia of chronic renal failure N18.9, D63.1   • Hypertension I10   • Diabetes mellitus (CMS/[facility]) E11.9   • Thyroid disease E07.9   • Hypothyroidism E03.9   • Vitamin D deficiency E55.9   • Anemia due to stage 4 chronic kidney disease (CMS/HCC) N18.4, D63.1   • Stage 4 chronic kidney disease (CMS/HCC) N18.4   • Allergic rhinitis with postnasal drip J30.9, R09.82   • Bone marrow suppression D75.89   • Abnormal coagulation profile  R79.1   • Anemia, chronic disease D63.8   • Class 3 severe obesity due to excess calories with serious comorbidity and body mass index (BMI) of 60.0 to 69.9 in adult (CMS/[facility]) E66.01, Z68.44     SURGERIES:  Past Surgical History:   Procedure Laterality Date   • ADENOIDECTOMY     • ANAL FISTULA REPAIR      x 2    • CHOLECYSTECTOMY     • COLONOSCOPY  01/02/2014   • COLONOSCOPY N/A 3/22/2017    Procedure: COLONOSCOPY WITH ANESTHESIA;  Surgeon: Mono Linder MD;  Location: [facility] ENDOSCOPY;  Service:    • D & C HYSTEROSCOPY N/A 7/25/2018    Procedure: DILATATION AND CURETTAGE HYSTEROSCOPY;  Surgeon: Michael Castaneda MD;  Location: [facility] OR;  Service: Obstetrics/Gynecology   • DILATATION AND CURETTAGE      X 2   • ENDOSCOPY     • HYSTERECTOMY     • TONSILLECTOMY       HEALTH MAINTENANCE ITEMS:  Health Maintenance Due   Topic Date Due   • Pneumococcal Vaccine 0-64 (1 of 3 - PCV13) 04/05/1966   • TDAP/TD VACCINES (1 - Tdap) Never done   • ZOSTER VACCINE (1 of 2) Never done   • HEPATITIS C SCREENING  Never done   • ANNUAL WELLNESS  VISIT  Never done   • DIABETIC FOOT EXAM  Never done   • PAP SMEAR  Never done   • DXA SCAN  11/14/2020       <no information>  Last Completed Colonoscopy     This patient has no relevant Health Maintenance data.        Immunization History   Administered Date(s) Administered   • COVID-19 (CAMILA) 03/09/2021   • Flu Mist 11/06/2014   • Pneumococcal, Unspecified 01/01/2015     Last Completed Mammogram          MAMMOGRAM (Every 2 Years) Next due on 8/12/2022 08/12/2020  Mammo Screening Digital Tomosynthesis Bilateral With CAD    05/16/2018  Mammo Screening Digital Tomosynthesis Bilateral With CAD    01/20/2015  MAMMOGRAPHY SCREENING BILATERAL                  FAMILY HISTORY:  Family History   Problem Relation Age of Onset   • Diabetes Other    • Heart failure Other    • Cancer Other    • Kidney disease Other    • Cancer Mother    • Cancer Father    • Heart disease Father    • Diabetes Father    • Obesity Father    • Stroke Father    • Cancer Maternal Grandmother    • Uterine cancer Maternal Grandmother    • Diabetes Maternal Grandfather    • Cancer Paternal Grandmother    • Colon cancer Paternal Grandmother    • Diabetes Paternal Grandfather    • Heart disease Paternal Grandfather    • No Known Problems Sister    • No Known Problems Brother    • No Known Problems Daughter    • No Known Problems Maternal Aunt    • No Known Problems Paternal Aunt    • BRCA 1/2 Neg Hx    • Breast cancer Neg Hx    • Endometrial cancer Neg Hx    • Ovarian cancer Neg Hx      SOCIAL HISTORY:  Social History     Socioeconomic History   • Marital status:      Spouse name: Not on file   • Number of children: Not on file   • Years of education: Not on file   • Highest education level: Not on file   Tobacco Use   • Smoking status: Never Smoker   • Smokeless tobacco: Never Used   Vaping Use   • Vaping Use: Never used   Substance and Sexual Activity   • Alcohol use: No   • Drug use: No   • Sexual activity: Never     Birth  "control/protection: Post-menopausal       REVIEW OF SYSTEMS:  Review of Systems   Constitutional: Positive for activity change and fatigue.   HENT: Negative.    Eyes: Negative.    Cardiovascular: Negative.    Gastrointestinal: Negative.    Endocrine: Negative.    Genitourinary: Negative.    Musculoskeletal: Positive for back pain.   Skin: Positive for dry skin.   Allergic/Immunologic: Negative.    Neurological: Positive for weakness.   Psychiatric/Behavioral: Positive for stress.       /60   Pulse 71   Temp 97.8 °F (36.6 °C)   Resp 16   Ht 165.1 cm (65\")   Wt (!) 175 kg (385 lb 11.2 oz)   LMP  (LMP Unknown)   SpO2 98%   Breastfeeding No   BMI 64.18 kg/m²  Body surface area is 2.62 meters squared.  Pain Score    06/30/21 1117   PainSc: 0-No pain       Objective   Vital Signs:   /60   Pulse 71   Temp 97.8 °F (36.6 °C)   Resp 16   Ht 165.1 cm (65\")   Wt (!) 175 kg (385 lb 11.2 oz)   LMP  (LMP Unknown)   SpO2 98%   Breastfeeding No   BMI 64.18 kg/m²  Body surface area is 2.62 meters squared.  Pain Score    06/30/21 1117   PainSc: 0-No pain     Maria C Shrestha reports a pain score of 0.  Given her pain assessment as noted, treatment options were discussed and the following options were decided upon as a follow-up plan to address the patient's pain:       Patient's Body mass index is 64.18 kg/m². indicating that she is .      Physical Exam  Constitutional:       General: She is in acute distress.      Appearance: Normal appearance. She is obese.   HENT:      Head: Atraumatic.      Mouth/Throat:      Mouth: Mucous membranes are dry.   Cardiovascular:      Rate and Rhythm: Normal rate.   Pulmonary:      Effort: Pulmonary effort is normal.   Abdominal:      Palpations: Abdomen is soft.   Musculoskeletal:         General: Normal range of motion.      Cervical back: Normal range of motion.   Skin:     General: Skin is dry.   Neurological:      General: No focal deficit present.      Mental Status: " She is oriented to person, place, and time.   Psychiatric:         Mood and Affect: Mood normal.         Behavior: Behavior normal.            Result Review :    LABS    Lab Results - Last 18 Months   Lab Units 06/30/21  1102 06/23/21  0805 06/18/21  0620 06/16/21  0955 05/12/21  1244 04/14/21  1340 03/17/21  1301 03/17/21  0904 02/17/21  1253   HEMOGLOBIN g/dL 10.4* 10.3* 11.4* 10.2* 10.8* 10.0* 9.8* 9.9* 10.3*   HEMATOCRIT % 32.9* 32.1* 35.3* 32.3* 33.3* 30.2* 29.8* 30.6* 32.3*   MCV fL 95.1 92.8 93.4 94.4 92.0 91.0 92.3 91.6 92.8   WBC 10*3/mm3 10.91* 11.02* 10.9* 8.4 8.29 9.35 9.36 8.60 9.87   RDW % 15.6* 15.6* 15.5* 15.3* 15.2 14.8 15.3 14.8 15.2   MPV fL 10.3 10.2 9.9 10.6 9.9 10.0 10.2 8.9 9.8   PLATELETS 10*3/mm3 190 211 223 203 219 209 186 220 212   IMM GRAN % % 0.5 0.8*  --   --  0.6* 0.6* 0.4  --  0.6*   NEUTROS ABS 10*3/mm3 8.00* 7.59*  --   --  6.49 6.67 6.86 6.30 7.04*   LYMPHS ABS 10*3/mm3 2.10 2.18  --   --  1.03 1.82 1.72 1.90 1.92   MONOS ABS 10*3/mm3 0.58 0.72  --   --  0.44 0.56 0.44  --  0.54   EOS ABS 10*3/mm3 0.14 0.39  --   --  0.24 0.20 0.26  --  0.27   BASOS ABS 10*3/mm3 0.04 0.05  --   --  0.04 0.04 0.04  --  0.04   IMMATURE GRANS (ABS) 10*3/mm3 0.05 0.09*  --   --  0.05 0.06* 0.04  --  0.06*   NRBC /100 WBC 0.0 0.0  --   --  0.0 0.0 0.0  --  0.0       Lab Results - Last 18 Months   Lab Units 06/30/21  1102 06/18/21  0621 06/16/21  0955 05/12/21  1244 04/14/21  1340 03/17/21  1301 03/17/21  0904 02/17/21  1253   GLUCOSE mg/dL 130* 185* 81 193* 126* 207* 145* 135*   SODIUM mmol/L 144 137 140 140 143 141 138 141   POTASSIUM mmol/L 4.1 4.5 4.0 4.2 3.4* 4.0 3.8 4.0   TOTAL CO2 mmol/L  --  20* 21*  --   --   --   --   --    CO2 mmol/L 22.0  --   --  19.0* 24.0 22.0 22.0* 26.0   CHLORIDE mmol/L 112* 103 108 110* 106 107 108 108*   ANION GAP mmol/L 10.0 14 11 11.0 13.0 12.0 8.0 7.0   CREATININE mg/dL 2.92* 3.3* 3.2* 3.28* 3.21* 2.75* 2.74* 2.55*   BUN mg/dL 45* 45* 34* 42* 28* 34* 33* 32*   BUN /  CREAT RATIO  15.4  --   --  12.8 8.7 12.4 12.0 12.5   CALCIUM mg/dL 8.4* 8.4* 8.6* 8.8 8.0* 8.5* 8.1*  8.5 8.9   EGFR IF NONAFRICN AM mL/min/1.73 16* 14* 15* 14* 15* 18* 18* 19*   ALK PHOS U/L 80 104  --  88 85 89 89 89   TOTAL PROTEIN g/dL 5.8* 7.4  --  6.2 6.3 6.4 6.7 6.9   ALT (SGPT) U/L 21 8  --  7 7 9 10 9   AST (SGOT) U/L 19 12  --  10 11 14 18 10   BILIRUBIN mg/dL 0.2 <0.2  --  0.2 0.2 <0.2 0.2 0.2   ALBUMIN g/dL 2.80* 3.5  --  3.10* 3.10* 3.10* 3.30* 3.30*   GLOBULIN gm/dL 3.0  --   --  3.1 3.2 3.3 3.4 3.6       Lab Results - Last 18 Months   Lab Units 05/12/21  1244   REFERENCE LAB REPORT  See Attached Report       Lab Results - Last 18 Months   Lab Units 06/30/21  1102 05/12/21  1244 04/14/21  1340 03/17/21  1301 03/17/21  0904 02/17/21  1253 10/14/20  1023 08/19/20  1255 07/15/20  1006 04/13/20  1038 01/08/20  0923   IRON mcg/dL  --  54 55 42  --  66 90 83  --   --   --    TIBC mcg/dL  --  244* 268* 261*  --  297* 273* 256*  --   --   --    IRON SATURATION %  --  22 21 16*  --  22 33 32  --   --   --    FERRITIN ng/mL 423.10* 462.30* 538.70* 322.40*  --  466.10* 405.70* 639.60*  --   --   --    TSH uIU/mL  --   --   --   --  4.940*  --  2.650  --  3.010 7.250* 3.590               Assessment and Plan    Problem List Items Addressed This Visit        Other    Chronic kidney disease, stage 4 (severe) (CMS/HCC)    Anemia    Anemia of chronic renal failure    Stage 4 chronic kidney disease (CMS/HCC)    Bone marrow suppression    Current Assessment & Plan     1. We have reviewed BM BX report  2. There is no evidence for MDS MPD Leukemia or Lymphoma         Anemia, chronic disease - Primary    Current Assessment & Plan     1. Her HGB has been improved to 11 range  2. Her ESR is above 40  3. We will start dexamethasone for anemia of chronic DS         Relevant Orders    CBC & Differential    Comprehensive Metabolic Panel    Vitamin B12    Folate    Sedimentation Rate    Class 3 severe obesity due to excess  calories with serious comorbidity and body mass index (BMI) of 60.0 to 69.9 in adult (CMS/HCC)    RESOLVED: Stage 3 chronic kidney disease (CMS/HCC)        In summary  1. She had IV FE and procrit  2. We will start dexamethasone for anemia of chronic DS    Follow Up     Patient was given instructions and counseling regarding her condition or for health maintenance advice. Please see specific information pulled into the AVS if appropriate.

## 2021-07-01 ENCOUNTER — TELEPHONE (OUTPATIENT)
Dept: VASCULAR SURGERY | Age: 61
End: 2021-07-01

## 2021-07-01 ENCOUNTER — OFFICE VISIT (OUTPATIENT)
Dept: VASCULAR SURGERY | Age: 61
End: 2021-07-01

## 2021-07-01 VITALS
SYSTOLIC BLOOD PRESSURE: 128 MMHG | RESPIRATION RATE: 18 BRPM | HEART RATE: 62 BPM | BODY MASS INDEX: 64.9 KG/M2 | OXYGEN SATURATION: 98 % | DIASTOLIC BLOOD PRESSURE: 58 MMHG | WEIGHT: 293 LBS | TEMPERATURE: 97.3 F

## 2021-07-01 DIAGNOSIS — N18.4 ANEMIA DUE TO STAGE 4 CHRONIC KIDNEY DISEASE (HCC): ICD-10-CM

## 2021-07-01 DIAGNOSIS — N18.4 STAGE 4 CHRONIC KIDNEY DISEASE (HCC): Primary | ICD-10-CM

## 2021-07-01 DIAGNOSIS — I12.9 BENIGN HYPERTENSION WITH CKD (CHRONIC KIDNEY DISEASE) STAGE IV (HCC): Primary | ICD-10-CM

## 2021-07-01 DIAGNOSIS — N18.4 BENIGN HYPERTENSION WITH CKD (CHRONIC KIDNEY DISEASE) STAGE IV (HCC): Primary | ICD-10-CM

## 2021-07-01 DIAGNOSIS — D63.1 ANEMIA DUE TO STAGE 4 CHRONIC KIDNEY DISEASE (HCC): ICD-10-CM

## 2021-07-01 PROCEDURE — 99024 POSTOP FOLLOW-UP VISIT: CPT | Performed by: PHYSICIAN ASSISTANT

## 2021-07-01 NOTE — TELEPHONE ENCOUNTER
I spoke with patient and explained instructions for procedure scheduled. I have mailed a copy of instructions to patient. Patient verbally understood instructions and will call our office with questions. Sandra Bond  1960  Fistulogram Instructions     1. Report to the [facility] at [facility] (go in the front door and to the left) on 21, at 6:00am for your procedure with Dr. Ayla Miller. 2. Nothing to eat or drink after midnight the night before the procedure. 3. Please take all medications as normally scheduled to take, including heart and blood pressure medicines with a sip of water. 4. Continue Aspirin medication. 5. Do not take Lasix, Bumex,  insulin, or any diabetic medicine the morning of the procedure. If you take insulin, you may only take 1/2 of any scheduled nightly dose, and none the morning of the procedure. You may take all regularly scheduled heart, cholesterol, and blood pressure medicines with a sip of water. 6. If you take Glucophage, Metformin, Actos Plus Met, or Glucovance,you can not take this the day of your procedure and two days after the procedure. 7. Hold Plavix/Coumadin for 0 days prior to surgery. 8. If you have sleep apnea and require C-PAP, please bring this with you to the hospital.  9. Bring a list of all of your allergies and medications with you to the hospital.  10. Please let our nurse know if you have had an allergy to iodine, shellfish, or x-ray dye. 6. Let the nurse know if you take any of the followin. Over the counter herbal supplements  2. Diclofenec, indomethacin, ketoprofen, Caridopa/levadopa, naproxen, sulindac, piroxicam, glucosamine, Chondrotin, cocchine, or methotrexate. 12. Plan to stay at the hospital for 4 - 6 hours before being released  by the physician. You will need someone to drive you home after the procedure. 13. Medications instructed to hold: Please see above.   14. Please stop at your local [email or web address] or pharmacy and buy a bottle of Hibiclens. Wash thoroughly with this the night before and the morning of the procedure, paying special attention to the area that will be operated on. Make sure you rinse very well. The Hibiclens should only be used prior to surgery. 15. To ensure the health and safety of our patients and staff, [facility] [place] has implemented visitor restrictions. Only one person will be allowed to accompany you for your procedure. If you or your visitor are exhibiting signs & symptoms of illness such as fever, cough, sore throat or body aches, we ask that you reschedule your procedure to a later date after your symptoms have been resolved. 12. New policy requires that anyone who comes into the hospital will be required to wear a face mask. A cloth mask is acceptable. 17. Other Directions: Please call our office with questions [phone].     You will not need to be tested for COVID 19 due to being vaccinated.               Unless instructed otherwise by your physician, cleanse incision/puncture site twice daily with soap and water. Apply dry gauze. Do not get in tub. Okay to shower. Do not apply any salve, cream, peroxide or alcohol to the incision. Call with any increasing redness or drainage. Elevate arm above heart level three times per day for 30 minutes each time to help with any swelling.               PLEASE NOTE:  If the patient is unable to sign his/her own paperwork, the appointed caregiver (POA, child, sibling, etc) must be present at the time of registration for all testing and procedures.     Transportation to and from all testing and procedure appointments is the sole responsibility of the patient, caregiver, and/or nursing facility in which they reside. Please remember you will not be able to drive after you are discharged.     Please call the office at [phone] with any questions or concerns.  Please allow 48-72 hours notice for cancellations or rescheduling.  We will attempt to accommodate your needs to the best of our capabilities, however, strict policies with procedure room availability does not allow much flexibility.

## 2021-07-01 NOTE — PROGRESS NOTES
Patient Care Team:  Caroline Alford MD as PCP - General (Pediatrics)      History and Physical T/B non maturing, figsulogram with BAM    Arik Mittal is a 60-year-old female who has a history of stage IV chronic kidney disease. She recently underwent a left brachiocephalic AV fistula creation for Manolo Fried she comes in today for evaluation of her fistula. She denies any pain numbness or tingling in her left arm or hand no reports of fever or chills      Dhiraj Fitzpatrick is a 64 y.o. female with the following history reviewed and recorded in [facility]:  Patient Active Problem List    Diagnosis Date Noted    Allergic rhinitis with postnasal drip 04/06/2021    Deep vein thrombosis of lower extremity ([address] 10/09/2019    Disease of liver 10/09/2019    Stage 4 chronic kidney disease ([address] 07/24/2018    Thyroid disease 02/26/2017    Sleep apnea 02/26/2017     Last Assessment & Plan:   Patient states that she currently uses a CPAP at night for obstructive sleep apnea. She states that she has noticed that she has had more drainage and sinus pressure causing throat and uvula swelling. She states that she feels like she is gagging or choking at times while she is trying to sleep. She is concerned and wishes to be evaluated for this. I will refer to ENT at this time. I will also treat for a possible sinus infection, unable to do steroids due to her being an insulin-dependent diabetic.       Mood disorder ([address] 02/26/2017    Type 2 DM with CKD stage 3 and hypertension ([facility] [address] 02/26/2017    Hypertriglyceridemia 02/26/2017    Anxiety 02/26/2017    Vitamin D deficiency 02/26/2017    Anemia 02/26/2017    Hypocalcemia 02/26/2017    Anemia of chronic renal failure 12/12/2016    Hypertension associated with diabetes ([address] 10/17/2016    B12 deficiency 10/17/2016    Mixed diabetic hyperlipidemia associated with type 2 diabetes mellitus ([address] 10/17/2016    Morbid obesity ([address] 09/15/2011    Embolism ([address] 01/01/2004     Updating deleted diagnoses       Current Outpatient Medications   Medication Sig Dispense Refill    dexamethasone (DECADRON) 1 MG tablet Take 1 mg by mouth daily (with breakfast)      lisinopril (PRINIVIL;ZESTRIL) 40 MG tablet Take 40 mg by mouth 2 times daily Indications: High Blood Pressure Disorder      levothyroxine (SYNTHROID) 75 MCG tablet Take 75 mcg by mouth Daily Indications: Underactive Thyroid      cetirizine (ZYRTEC) 10 MG tablet Take 10 mg by mouth as needed for Allergies       diazePAM (VALIUM) 2 MG tablet Take 2 mg by mouth as needed for Anxiety.        Evolocumab 140 MG/ML SOAJ Inject 140 mg into the skin every 14 days      fluticasone (FLONASE) 50 MCG/ACT nasal spray 2 sprays by Nasal route daily as needed for Rhinitis       insulin lispro (HUMALOG KWIKPEN) 200 UNIT/ML SOPN pen Inject 25-80 Units into the skin 3 times daily (with meals)       Insulin Pen Needle (B-D ULTRAFINE III SHORT PEN) 31G X 8 MM MISC USE AS DIRECTED TO INJECT FOUR TIMES DAILY      Insulin Syringe-Needle U-100 (BD INSULIN SYRINGE U/F) 31G X 5/16\" 1 ML MISC AS DIRECTED FOUR TIMES A DAY      FreeStyle Lancets MISC Use as instructed 4 x daily      Omega-3 Fatty Acids (FISH OIL) 1000 MG CAPS Take 1,000 mg by mouth Daily with supper       potassium gluconate 550 mg tablet Take 550 mg by mouth daily       epoetin jose ramon (EPOGEN;PROCRIT) 34943 UNIT/ML injection Inject 10,000 Units into the skin every 30 days       Dulaglutide 1.5 MG/0.5ML SOPN Inject 0.75 mg into the skin once a week       lamoTRIgine (LAMICTAL) 100 MG tablet Take 100 mg by mouth daily Indications: 1/2 tablet with dinner      amLODIPine (NORVASC) 5 MG tablet Take 10 mg by mouth daily WIT LUNCH      allopurinol (ZYLOPRIM) 100 MG tablet Take 100 mg by mouth 2 times daily      Ergocalciferol (VITAMIN D2 PO) Take 50,000 Units by mouth three times a week       calcitRIOL (ROCALTROL) 0.25 MCG capsule Take 0.25 mcg by mouth every other day      Multiple Vitamins-Minerals (COMPLETE MULTIVITAMIN/MINERAL PO) Take 1 tablet by mouth daily       citalopram (CELEXA) 40 MG tablet Take 40 mg by mouth daily      rosuvastatin (CRESTOR) 10 MG tablet Take 10 mg by mouth daily      carvedilol (COREG) 3.125 MG tablet Take 3.125 mg by mouth 2 times daily (with meals)      sodium bicarbonate 650 MG tablet Take 650 mg by mouth 3 times daily       busPIRone (BUSPAR) 10 MG tablet Take 20 mg by mouth 2 times daily      aspirin 81 MG tablet Take 81 mg by mouth daily With Dinner      Insulin Glargine (LANTUS SC) Inject 90 Units into the skin daily With Evening Meals       No current facility-administered medications for this visit.      Allergies: Codeine  Past Medical History:   Diagnosis Date    Anxiety     CKD (chronic kidney disease)     stage 4    Colon polyp     Depression     Embolism - blood clot 2004    Hyperlipidemia     Hypertension     Obesity     Sleep apnea     Thyroid disease     Type II or unspecified type diabetes mellitus without mention of complication, not stated as uncontrolled      Past Surgical History:   Procedure Laterality Date    CHOLECYSTECTOMY      DIALYSIS FISTULA CREATION Left 6/18/2021    LEFT BRACHIAL-CEPHALIC AV FISTULA CREATION performed by Azucena Lofton [facility] at [facility] 21  10/17/2018    POLYPECTOMY      RECTAL SURGERY      fistula repair    TONSILLECTOMY AND ADENOIDECTOMY       Family History   Problem Relation Age of Onset    Lung Cancer Mother     Brain Cancer Mother     Heart Attack Father     Prostate Cancer Father     Heart Disease Father     Stroke Father     Uterine Cancer Maternal Grandmother     Cervical Cancer Maternal Grandmother     Diabetes Maternal Grandfather     Other Maternal Grandfather         histoplasmosis    Diabetes Paternal Grandfather      Social History     Tobacco Use    Smoking status: Never Smoker    Smokeless tobacco: Never Used Substance Use Topics    Alcohol use: No     Review of Systems    Constitutional -   No fever or chills   HENT - no HA's, tinnitus, rhinorrhea, sore throat  Eyes - no sudden vision change or amaurosis. Respiratory - SOB or chest pain  Cardiovascular - no chest pain, syncope, or significant dizziness. No palpitations  (see HPI)  Gastrointestinal - no significant abdominal pain. No blood in stool. No diarrhea, nausea, or vomiting. Genitourinary - No difficulty urinating, dysuria, frequency, or urgency. No flank pain or hematuria. Musculoskeletal - no back pain or myalgia. Skin - no rashes or wounds   Neurologic - no dizziness, facial asymmetry, or light headedness. No seizures. No new onset of partial or complete loss of vision affecting only one eye, speech difficulty or lateralizing weakness, numbness/tingling   Hematologic - no excessive bleeding. Psychiatric - no severe anxiety or nervousness. No confusion. All other review of systems are negative. Physical Exam    BP (!) 128/58 (Site: Right Upper Arm, Position: Sitting, Cuff Size: Medium Adult)   Pulse 62   Temp 97.3 °F (36.3 °C) (Temporal)   Resp 18   Wt (!) 390 lb (176.9 kg)   LMP 01/01/2000   SpO2 98%   BMI 64.90 kg/m²     Constitutional - No acute distress. HENT - head normocephalic. Hearing is intact   Eyes - conjunctiva normal.  EOMS normal.  No exudate. No icterus. Neck- ROM appears normal, no tracheal deviation. Cardiovascular - Regular rate and rhythm. Heart sounds are normal.  No murmur, rub, or gallop. Carotid pulses bilaterally without bruit. Extremities - Radial and ulnar pulses are 2+ to palpation bilaterally. Bruit present over fistula however the thrill is more prominent over the distal aspect of the fistula this appears to be somewhat nonmaturing. Left hand is warm pink and mobile  Pulmonary - effort appears normal.  No respiratory distress.     Lungs - Breath sounds normal.   GI - Abdomen - No distension or palpable mass. Genitourinary - deferred. Musculoskeletal - ROM appears normal.  No significant edema. Neurologic - alert and oriented X 3. Face symmetric. No lateralizing weakness noted. Skin - warm, dry, and intact. No rash, erythema, or pallor. Psychiatric - mood, affect, and behavior appear normal.  Judgment and thought processes appear normal.    Options have been discussed with the patient including continued medical management vs. proceeding with Fistulogram of BAM.  Patient has opted to proceed with fistulogram with BAM.  Risks have been discussed with the patient including but not limited to MI, death, CVA, bleed, nerve injury, steal, and infection. Assessment      1.   Stage IV chronic kidney disease      Plan      Recommend she continue strengthening exercises  Proceed with fistulogram with BAM

## 2021-07-06 ENCOUNTER — TELEPHONE (OUTPATIENT)
Dept: BARIATRICS/WEIGHT MGMT | Facility: CLINIC | Age: 61
End: 2021-07-06

## 2021-07-07 ENCOUNTER — OFFICE VISIT (OUTPATIENT)
Dept: BARIATRICS/WEIGHT MGMT | Facility: CLINIC | Age: 61
End: 2021-07-07

## 2021-07-07 ENCOUNTER — OFFICE VISIT (OUTPATIENT)
Dept: BARIATRICS/WEIGHT MGMT | Facility: HOSPITAL | Age: 61
End: 2021-07-07

## 2021-07-07 VITALS
TEMPERATURE: 98.6 F | HEART RATE: 66 BPM | BODY MASS INDEX: 48.82 KG/M2 | SYSTOLIC BLOOD PRESSURE: 143 MMHG | WEIGHT: 293 LBS | HEIGHT: 65 IN | OXYGEN SATURATION: 98 % | DIASTOLIC BLOOD PRESSURE: 74 MMHG

## 2021-07-07 DIAGNOSIS — E78.2 MIXED DIABETIC HYPERLIPIDEMIA ASSOCIATED WITH TYPE 2 DIABETES MELLITUS (HCC): ICD-10-CM

## 2021-07-07 DIAGNOSIS — E03.9 HYPOTHYROIDISM, UNSPECIFIED TYPE: ICD-10-CM

## 2021-07-07 DIAGNOSIS — N18.4 CHRONIC KIDNEY DISEASE, STAGE 4 (SEVERE) (HCC): ICD-10-CM

## 2021-07-07 DIAGNOSIS — I10 HYPERTENSION, UNSPECIFIED TYPE: ICD-10-CM

## 2021-07-07 DIAGNOSIS — E66.01 CLASS 3 SEVERE OBESITY DUE TO EXCESS CALORIES WITH SERIOUS COMORBIDITY AND BODY MASS INDEX (BMI) OF 60.0 TO 69.9 IN ADULT (HCC): Primary | ICD-10-CM

## 2021-07-07 DIAGNOSIS — E11.69 MIXED DIABETIC HYPERLIPIDEMIA ASSOCIATED WITH TYPE 2 DIABETES MELLITUS (HCC): ICD-10-CM

## 2021-07-07 PROBLEM — N18.30 STAGE 3 CHRONIC KIDNEY DISEASE (HCC): Status: RESOLVED | Noted: 2021-06-30 | Resolved: 2021-07-07

## 2021-07-07 PROCEDURE — 99214 OFFICE O/P EST MOD 30 MIN: CPT | Performed by: NURSE PRACTITIONER

## 2021-07-07 NOTE — PROGRESS NOTES
Patient Care Team:  Ole Soliman MD as PCP - General  TomásBeverly noel (Inactive) as Technician  Beverly England (Inactive) as Technician  TomásBeverly noel (Inactive) as Technician  Gregorio Davis MD as Consulting Physician (Nephrology)  Sola Mallory APRN as Nurse Practitioner (Hematology and Oncology)  Wilman Negrete MD as Consulting Physician (Endocrinology)      Subjective     Patient is a 61 y.o. female presents with morbid obesity and her Body mass index is 65.6 kg/m².     She is here for discussion of medical weight loss options.  She stated she has been with the disease of obesity for year(s).  She stated she suffers from hyperlipidemia, CPAP, kidney failure, hypothyroid and morbid obesity due to her weight gain.  She stated that weight loss helps alleviate these symptoms.   She stated that she has tried other diet regimens such as weight watchers, high protein to help with weight loss.  She stated that she has attempted these conservative methods for weight loss without maintaining long term success.  Today she would like to discuss medical weight loss options.    Patient was referred to our office from her oncologist.  Patient has had a bone marrow biopsy, and a recent fistula placement.  Patient states that she is waiting to have dialysis.  Patient has multiple comorbidities and has dealt with obesity for years.  Patient is here today for weight management.        Review of Systems   Constitutional: Positive for fatigue and unexpected weight gain.   Respiratory: Positive for wheezing.    Cardiovascular: Negative.    Gastrointestinal: Negative.    Endocrine: Negative.    Musculoskeletal: Positive for arthralgias, back pain and myalgias.   Skin: Positive for rash.   Hematological: Bruises/bleeds easily.   Psychiatric/Behavioral: Positive for agitation and depressed mood. The patient is nervous/anxious.         History  Past Medical History:   Diagnosis Date   • Acquired  hypothyroidism 2/6/2017   • Allergic    • Anemia    • Anemia due to stage 4 chronic kidney disease (CMS/[facility]) 8/18/2020   • Anxiety    • Arthritis    • Cellulitis    • Chronic kidney disease     3rd stage   • Depression    • Disease of thyroid gland    • Dyslipidemia    • Elevated cholesterol    • Essential hypertension    • Fatigue    • Gallbladder abscess    • Hearing loss    • Light headed    • Limited range of motion (ROM) of shoulder     right   • Obesity    • Palpitation    • Short of breath on exertion    • Sinusitis    • Sleep apnea    • Stage 4 chronic kidney disease (CMS/[facility]) 9/16/2020   • Type 2 diabetes mellitus (CMS/[facility])    • Type II diabetes mellitus, uncontrolled (CMS/[facility])    • Wears glasses       Past Surgical History:   Procedure Laterality Date   • ADENOIDECTOMY     • ANAL FISTULA REPAIR      x 2    • CHOLECYSTECTOMY     • COLONOSCOPY  01/02/2014   • COLONOSCOPY N/A 3/22/2017    Procedure: COLONOSCOPY WITH ANESTHESIA;  Surgeon: Mono Linder MD;  Location: [facility] ENDOSCOPY;  Service:    • D & C HYSTEROSCOPY N/A 7/25/2018    Procedure: DILATATION AND CURETTAGE HYSTEROSCOPY;  Surgeon: Michael Castaneda MD;  Location: [facility] OR;  Service: Obstetrics/Gynecology   • DILATATION AND CURETTAGE      X 2   • ENDOSCOPY     • HYSTERECTOMY     • TONSILLECTOMY        Social History     Socioeconomic History   • Marital status:      Spouse name: Not on file   • Number of children: Not on file   • Years of education: Not on file   • Highest education level: Not on file   Tobacco Use   • Smoking status: Never Smoker   • Smokeless tobacco: Never Used   Vaping Use   • Vaping Use: Never used   Substance and Sexual Activity   • Alcohol use: No   • Drug use: No   • Sexual activity: Never     Birth control/protection: Post-menopausal      Family History   Problem Relation Age of Onset   • Diabetes Other    • Heart failure Other    • Cancer Other    • Kidney disease Other    • Cancer Mother    • Cancer Father   "  • Heart disease Father    • Diabetes Father    • Obesity Father    • Stroke Father    • Cancer Maternal Grandmother    • Uterine cancer Maternal Grandmother    • Diabetes Maternal Grandfather    • Cancer Paternal Grandmother    • Colon cancer Paternal Grandmother    • Diabetes Paternal Grandfather    • Heart disease Paternal Grandfather    • No Known Problems Sister    • No Known Problems Brother    • No Known Problems Daughter    • No Known Problems Maternal Aunt    • No Known Problems Paternal Aunt    • BRCA 1/2 Neg Hx    • Breast cancer Neg Hx    • Endometrial cancer Neg Hx    • Ovarian cancer Neg Hx       Allergies   Allergen Reactions   • Codeine Nausea Only and Nausea And Vomiting     N/v/felt hot          Current Outpatient Medications:   •  allopurinol (ZYLOPRIM) 100 MG tablet, Take 100 mg by mouth 2 (Two) Times a Day., Disp: , Rfl:   •  amLODIPine (NORVASC) 10 MG tablet, Take 10 mg by mouth., Disp: , Rfl:   •  aspirin 81 MG tablet, Take 81 mg by mouth Daily. Stop 7/22/2018, Disp: , Rfl:   •  BD Insulin Syringe U/F 31G X 5/16\" 1 ML misc, AS DIRECTED FOUR TIMES A DAY, Disp: 200 each, Rfl: 1  •  busPIRone (BUSPAR) 10 MG tablet, TAKE 2 TABLETS TWICE A DAY AS NEEDED, Disp: 360 tablet, Rfl: 3  •  carvedilol (COREG) 12.5 MG tablet, Take 12.5 mg by mouth 2 (Two) Times a Day With Meals., Disp: , Rfl:   •  Cholecalciferol (VITAMIN D3) 33358 units tablet, Take 1 capsule by mouth 2 (Two) Times a Week., Disp: , Rfl:   •  citalopram (CeleXA) 40 MG tablet, TAKE 1 TABLET DAILY, Disp: 90 tablet, Rfl: 3  •  dexamethasone (DECADRON) 1 MG tablet, Take 1 tablet by mouth Daily for 14 days., Disp: 14 tablet, Rfl: 2  •  diazePAM (VALIUM) 2 MG tablet, Take 2 mg by mouth As Needed for Anxiety., Disp: , Rfl:   •  Dulaglutide (Trulicity) 3 MG/0.5ML solution pen-injector, Inject 3 mg under the skin into the appropriate area as directed 1 (One) Time Per Week., Disp: 12 pen, Rfl: 3  •  epoetin elsa (EPOGEN,PROCRIT) 89513 UNIT/ML " injection, Inject 10,000 Units under the skin into the appropriate area as directed Every 30 (Thirty) Days., Disp: , Rfl:   •  Evolocumab 140 MG/ML solution auto-injector, Inject 1 mL under the skin into the appropriate area as directed Every 14 (Fourteen) Days for 24 doses. NDC [address]-74, Disp: 6 mL, Rfl: 10  •  fluticasone (FLONASE) 50 MCG/ACT nasal spray, 1 spray into the nostril(s) as directed by provider 2 (Two) Times a Day., Disp: , Rfl:   •  Glucose Blood (BLOOD GLUCOSE TEST) strip, Use 4 x daily, use any brand covered by insurance or same brand as before, Disp: 360 each, Rfl: 3  •  insulin glargine (Lantus) 100 UNIT/ML injection, INJECT 200 UNITS DAILY (Patient taking differently: Inject 90 Units under the skin into the appropriate area as directed Daily. INJECT 200 UNITS DAILY), Disp: 60 mL, Rfl: 11  •  Insulin Lispro (HumaLOG KwikPen) 200 UNIT/ML solution pen-injector, Inject 80 Units under the skin into the appropriate area as directed 3 (Three) Times a Day With Meals., Disp: 24 pen, Rfl: 11  •  Insulin Pen Needle (B-D ULTRAFINE III SHORT PEN) 31G X 8 MM misc, USE AS DIRECTED TO INJECT FOUR TIMES DAILY, Disp: 200 each, Rfl: 2  •  lamoTRIgine (LaMICtal) 100 MG tablet, TAKE ONE-HALF (1/2) TABLET DAILY, Disp: 45 tablet, Rfl: 3  •  Lancets (freestyle) lancets, Use as instructed 4 x daily, Disp: 150 each, Rfl: 11  •  levothyroxine (SYNTHROID, LEVOTHROID) 88 MCG tablet, Take 1 tablet by mouth Daily., Disp: 90 tablet, Rfl: 3  •  lisinopril (PRINIVIL,ZESTRIL) 40 MG tablet, Take 40 mg by mouth 2 (Two) Times a Day., Disp: , Rfl:   •  Omega-3 Fatty Acids (FISH OIL) 1000 MG capsule capsule, Take 2,000 mg by mouth Daily With Dinner., Disp: , Rfl:   •  potassium gluconate 595 (99 K) MG tablet tablet, potassium gluconate 595 mg (99 mg) tablet  Take 1 tablets every day by oral route., Disp: , Rfl:   •  rosuvastatin (CRESTOR) 20 MG tablet, , Disp: , Rfl:   •  sodium bicarbonate 650 MG tablet, Take 1,300 mg by mouth 3  (Three) Times a Day., Disp: , Rfl:   •  azelastine (ASTELIN) 0.1 % nasal spray, 2 sprays into the nostril(s) as directed by provider 2 (Two) Times a Day. Use in each nostril as directed, Disp: 30 mL, Rfl: 11  •  cetirizine (zyrTEC) 10 MG tablet, Take 10 mg by mouth Daily., Disp: , Rfl:   •  Evolocumab (REPATHA) solution auto-injector SureClick injection, Inject 140 mg under the skin into the appropriate area as directed Every 14 (Fourteen) Days., Disp: , Rfl:   •  Multiple Vitamins-Minerals (MULTIVITAMIN ADULT PO), Take 1 tablet by mouth Daily., Disp: , Rfl:   •  mupirocin (BACTROBAN) 2 % ointment, Apply  topically to the appropriate area as directed 3 (Three) Times a Day. (Patient taking differently: Apply 11 application topically to the appropriate area as directed As Needed.), Disp: 22 g, Rfl: 0  •  vitamin D (ERGOCALCIFEROL) 1.25 MG (90069 UT) capsule capsule, Take 50,000 Units by mouth 1 (One) Time Per Week. 3x weekly, Disp: , Rfl:     Objective     Vital Signs  Temp:  [98.6 °F (37 °C)] 98.6 °F (37 °C)  Heart Rate:  [66] 66  BP: (143)/(74) 143/74  Body mass index is 65.6 kg/m².      07/07/21  1055   Weight: (!) 177 kg (391 lb 3.2 oz)       Physical Exam  Vitals reviewed.   Constitutional:       Appearance: She is obese.      Comments: Wheelchair nearby patient was able to ambulate from hallway to room   Cardiovascular:      Rate and Rhythm: Normal rate and regular rhythm.   Pulmonary:      Effort: Pulmonary effort is normal.   Abdominal:      General: Bowel sounds are normal.      Palpations: Abdomen is soft.      Comments: Large mid abdominal scar from past gallbladder surgery.   Musculoskeletal:         General: Normal range of motion.      Comments: Swelling to left forearm from recent fistula placement   Skin:     General: Skin is warm and dry.   Neurological:      Mental Status: She is alert and oriented to person, place, and time.   Psychiatric:         Mood and Affect: Mood normal.         Behavior:  Behavior normal.            Results Review:   I reviewed the patient's new clinical results.  CBC & Differential (06/30/2021 11:02)  Comprehensive Metabolic Panel (06/30/2021 11:02)  Ferritin (06/30/2021 11:02)  Sedimentation Rate (06/30/2021 11:02)      Patient's Body mass index is 65.6 kg/m². indicating that she is morbidly obese (BMI > 40 or > 35 with obesity - related health condition). Obesity-related health conditions include the following: obstructive sleep apnea, hypertension, diabetes mellitus, dyslipidemias and GERD. Obesity is worsening. BMI is is above average; no BMI management plan is appropriate. We discussed portion control and increasing exercise..      Assessment/Plan   Problem List Items Addressed This Visit        Cardiac and Vasculature    Mixed diabetic hyperlipidemia associated with type 2 diabetes mellitus (CMS/HCC)    Relevant Medications    Evolocumab 140 MG/ML solution auto-injector    insulin glargine (Lantus) 100 UNIT/ML injection    Insulin Lispro (HumaLOG KwikPen) 200 UNIT/ML solution pen-injector    Dulaglutide (Trulicity) 3 MG/0.5ML solution pen-injector    rosuvastatin (CRESTOR) 20 MG tablet    Evolocumab (REPATHA) solution auto-injector SureClick injection    Hypertension    Relevant Medications    lisinopril (PRINIVIL,ZESTRIL) 40 MG tablet    amLODIPine (NORVASC) 10 MG tablet    carvedilol (COREG) 12.5 MG tablet       Endocrine and Metabolic    Hypothyroidism    Relevant Medications    carvedilol (COREG) 12.5 MG tablet    levothyroxine (SYNTHROID, LEVOTHROID) 88 MCG tablet    dexamethasone (DECADRON) 1 MG tablet    Class 3 severe obesity due to excess calories with serious comorbidity and body mass index (BMI) of 60.0 to 69.9 in adult (CMS/[facility]) - Primary       Genitourinary and Reproductive     Chronic kidney disease, stage 4 (severe) (CMS/[facility])             I discussed the patient's findings and my recommendations with patient.     Patient will be in her medical weight loss  "program.      She has been provided a structured dietary regimen based off of her behavior.  I discussed with the patient the etiology of the disease of obesity and the potential comorbid conditions associated with this disease.  She was instructed to follow the dietary regimen and follow-up with our program in 1 month's time with any additional questions as they may arise during this time.  We emphasized on focusing on proteins and meals high in fiber as well as adequate hydration that exceed 64 ounces of water daily.    I explained that I anticipate the patient to lose weight prior to her next monthly visit.  I have also explained that they need to record or document when they are going to have the \"cheat day\".    I reviewed patient's medical history with her.  Patient will see dietitian today and will return in 1 month for a weight recheck.      FLOR Esquivel     07/07/21  12:12 CDT    "

## 2021-07-07 NOTE — PROGRESS NOTES
"Nutrition Services    Patient Name:  Maria C Shretsha  YOB: 1960  MRN: [id number]  Admit Date:  (Not on file)    NUTRITION BARIATRIC/MWL NOTE     Visit-1   Initial Assessment   BA#   MWL    Anthropometrics   Height: 64.75 in  Weight: 391 lbs 3.2 oz  BMI: 65.60    Nutrition Recall  Eating ___2-3___ meals daily   Snacking-evening  Limited sweet intake  Drinking carbonated beverages  Drinking greater to or equal to 64 fluid ounces    Education    Goal Setting and Information Packet  4 meal per day diet plan  4 meal per day sample menu  \"Perfect Protein, Fiber in Foods, and Reducing Fat\"  Reinforce Nutritional Needs for Surgery  Reinforce Nutritional Needs for MWL    Nutrition Goals   Eat __4___ meals per day with protein  Eat protein first at meals  Protein goal: 65gms    discussed protein guidelines for shakes and bar  Healthier food choices  Portion control / Use smaller plate or measuring cup   Eliminate soda  Increase fluid intake to 64 ounces per day      Electronically signed by:  Jessika Talbot  07/07/21 12:52 CDT   "

## 2021-07-08 ENCOUNTER — TELEPHONE (OUTPATIENT)
Dept: VASCULAR SURGERY | Age: 61
End: 2021-07-08

## 2021-07-08 ENCOUNTER — PREP FOR PROCEDURE (OUTPATIENT)
Dept: VASCULAR SURGERY | Age: 61
End: 2021-07-08

## 2021-07-08 RX ORDER — SODIUM CHLORIDE 9 MG/ML
25 INJECTION, SOLUTION INTRAVENOUS PRN
Status: CANCELLED | OUTPATIENT
Start: 2021-07-08

## 2021-07-08 RX ORDER — ASPIRIN 81 MG/1
81 TABLET ORAL ONCE
Status: CANCELLED | OUTPATIENT
Start: 2021-07-08 | End: 2021-07-08

## 2021-07-08 RX ORDER — SODIUM CHLORIDE 9 MG/ML
INJECTION, SOLUTION INTRAVENOUS CONTINUOUS
Status: CANCELLED | OUTPATIENT
Start: 2021-07-08

## 2021-07-08 RX ORDER — SODIUM CHLORIDE 0.9 % (FLUSH) 0.9 %
10 SYRINGE (ML) INJECTION PRN
Status: CANCELLED | OUTPATIENT
Start: 2021-07-08

## 2021-07-08 NOTE — H&P (VIEW-ONLY)
Patient Care Team:  Dayanara Sabillon MD as PCP - General (Pediatrics)      History and Physical T/B non maturing, figsulogram with BAM    Blase Hayes is a 51-year-old female who has a history of stage IV chronic kidney disease. She recently underwent a left brachiocephalic AV fistula creation by Dr. Gabbi Cody. She comes in today for evaluation of her fistula. She denies any pain numbness or tingling in her left arm or hand. No reports of fever or chills      Vanda López is a 64 y.o. female with the following history reviewed and recorded in [facility]:  Patient Active Problem List    Diagnosis Date Noted    Allergic rhinitis with postnasal drip 04/06/2021    Deep vein thrombosis of lower extremity ([facility] 10/09/2019    Disease of liver 10/09/2019    Stage 4 chronic kidney disease ([facility] 07/24/2018    Thyroid disease 02/26/2017    Sleep apnea 02/26/2017     Last Assessment & Plan:   Patient states that she currently uses a CPAP at night for obstructive sleep apnea. She states that she has noticed that she has had more drainage and sinus pressure causing throat and uvula swelling. She states that she feels like she is gagging or choking at times while she is trying to sleep. She is concerned and wishes to be evaluated for this. I will refer to ENT at this time. I will also treat for a possible sinus infection, unable to do steroids due to her being an insulin-dependent diabetic.       Mood disorder ([facility] 02/26/2017    Type 2 DM with CKD stage 3 and hypertension ([facility] [address] 02/26/2017    Hypertriglyceridemia 02/26/2017    Anxiety 02/26/2017    Vitamin D deficiency 02/26/2017    Anemia 02/26/2017    Hypocalcemia 02/26/2017    Anemia of chronic renal failure 12/12/2016    Hypertension associated with diabetes ([address] 10/17/2016    B12 deficiency 10/17/2016    Mixed diabetic hyperlipidemia associated with type 2 diabetes mellitus ([address] 10/17/2016    Morbid obesity ([address] 09/15/2011    Embolism ([facility] [address] 01/01/2004 Updating deleted diagnoses       Current Outpatient Medications   Medication Sig Dispense Refill    dexamethasone (DECADRON) 1 MG tablet Take 1 mg by mouth daily (with breakfast)      lisinopril (PRINIVIL;ZESTRIL) 40 MG tablet Take 40 mg by mouth 2 times daily Indications: High Blood Pressure Disorder      levothyroxine (SYNTHROID) 75 MCG tablet Take 75 mcg by mouth Daily Indications: Underactive Thyroid      cetirizine (ZYRTEC) 10 MG tablet Take 10 mg by mouth as needed for Allergies       diazePAM (VALIUM) 2 MG tablet Take 2 mg by mouth as needed for Anxiety.        Evolocumab 140 MG/ML SOAJ Inject 140 mg into the skin every 14 days      fluticasone (FLONASE) 50 MCG/ACT nasal spray 2 sprays by Nasal route daily as needed for Rhinitis       insulin lispro (HUMALOG KWIKPEN) 200 UNIT/ML SOPN pen Inject 25-80 Units into the skin 3 times daily (with meals)       Insulin Pen Needle (B-D ULTRAFINE III SHORT PEN) 31G X 8 MM MISC USE AS DIRECTED TO INJECT FOUR TIMES DAILY      Insulin Syringe-Needle U-100 (BD INSULIN SYRINGE U/F) 31G X 5/16\" 1 ML MISC AS DIRECTED FOUR TIMES A DAY      FreeStyle Lancets MISC Use as instructed 4 x daily      Omega-3 Fatty Acids (FISH OIL) 1000 MG CAPS Take 1,000 mg by mouth Daily with supper       potassium gluconate 550 mg tablet Take 550 mg by mouth daily       epoetin jose ramon (EPOGEN;PROCRIT) 20919 UNIT/ML injection Inject 10,000 Units into the skin every 30 days       Dulaglutide 1.5 MG/0.5ML SOPN Inject 0.75 mg into the skin once a week       lamoTRIgine (LAMICTAL) 100 MG tablet Take 100 mg by mouth daily Indications: 1/2 tablet with dinner      amLODIPine (NORVASC) 5 MG tablet Take 10 mg by mouth daily WIT LUNCH      allopurinol (ZYLOPRIM) 100 MG tablet Take 100 mg by mouth 2 times daily      Ergocalciferol (VITAMIN D2 PO) Take 50,000 Units by mouth three times a week       calcitRIOL (ROCALTROL) 0.25 MCG capsule Take 0.25 mcg by mouth every other day      Multiple Vitamins-Minerals (COMPLETE MULTIVITAMIN/MINERAL PO) Take 1 tablet by mouth daily       citalopram (CELEXA) 40 MG tablet Take 40 mg by mouth daily      rosuvastatin (CRESTOR) 10 MG tablet Take 10 mg by mouth daily      carvedilol (COREG) 3.125 MG tablet Take 3.125 mg by mouth 2 times daily (with meals)      sodium bicarbonate 650 MG tablet Take 650 mg by mouth 3 times daily       busPIRone (BUSPAR) 10 MG tablet Take 20 mg by mouth 2 times daily      aspirin 81 MG tablet Take 81 mg by mouth daily With Dinner      Insulin Glargine (LANTUS SC) Inject 90 Units into the skin daily With Evening Meals       No current facility-administered medications for this visit.      Allergies: Codeine  Past Medical History:   Diagnosis Date    Anxiety     CKD (chronic kidney disease)     stage 4    Colon polyp     Depression     Embolism - blood clot 2004    Hyperlipidemia     Hypertension     Obesity     Sleep apnea     Thyroid disease     Type II or unspecified type diabetes mellitus without mention of complication, not stated as uncontrolled      Past Surgical History:   Procedure Laterality Date    CHOLECYSTECTOMY      DIALYSIS FISTULA CREATION Left 6/18/2021    LEFT BRACHIAL-CEPHALIC AV FISTULA CREATION performed by Dereje Abad MD at [place] [address]  10/17/2018    POLYPECTOMY      RECTAL SURGERY      fistula repair    TONSILLECTOMY AND ADENOIDECTOMY       Family History   Problem Relation Age of Onset    Lung Cancer Mother     Brain Cancer Mother     Heart Attack Father     Prostate Cancer Father     Heart Disease Father     Stroke Father     Uterine Cancer Maternal Grandmother     Cervical Cancer Maternal Grandmother     Diabetes Maternal Grandfather     Other Maternal Grandfather         histoplasmosis    Diabetes Paternal Grandfather      Social History     Tobacco Use    Smoking status: Never Smoker    Smokeless tobacco: Never Used Substance Use Topics    Alcohol use: No     Review of Systems    Constitutional    No fever or chills   HENT  no HA's, tinnitus, rhinorrhea, sore throat  Eyes  no sudden vision change or amaurosis. Respiratory  SOB or chest pain  Cardiovascular  no chest pain, syncope, or significant dizziness. No palpitations  (see HPI)  Gastrointestinal  no significant abdominal pain. No blood in stool. No diarrhea, nausea, or vomiting. Genitourinary  No difficulty urinating, dysuria, frequency, or urgency. No flank pain or hematuria. Musculoskeletal  no back pain or myalgia. Skin  no rashes or wounds   Neurologic  no dizziness, facial asymmetry, or light headedness. No seizures. No new onset of partial or complete loss of vision affecting only one eye, speech difficulty or lateralizing weakness, numbness/tingling   Hematologic  no excessive bleeding. Psychiatric  no severe anxiety or nervousness. No confusion. All other review of systems are negative. Physical Exam    Constitutional  No acute distress. HENT  head normocephalic. Hearing is intact   Eyes  conjunctiva normal.  EOMS normal.  No exudate. No icterus. Neck- ROM appears normal, no tracheal deviation. Cardiovascular  Regular rate and rhythm. Heart sounds are normal.  No murmur, rub, or gallop. Carotid pulses bilaterally without bruit. Extremities - Radial and ulnar pulses are 2+ to palpation bilaterally. Bruit present over fistula however the thrill is more prominent over the distal aspect of the fistula this appears to be somewhat nonmaturing. Left hand is warm pink and mobile  Pulmonary  effort appears normal.  No respiratory distress. Lungs - Breath sounds normal.   GI - Abdomen  No distension or palpable mass. Genitourinary  deferred. Musculoskeletal  ROM appears normal.  No significant edema. Neurologic  alert and oriented X 3. Face symmetric. No lateralizing weakness noted. Skin  warm, dry, and intact. No rash, erythema, or pallor. Psychiatric  mood, affect, and behavior appear normal.  Judgment and thought processes appear normal.    Options have been discussed with the patient including continued medical management vs. proceeding with Fistulogram of BAM.  Patient has opted to proceed with fistulogram with BAM.  Risks have been discussed with the patient including but not limited to MI, death, CVA, bleed, nerve injury, steal, and infection. Assessment      1.   Stage IV chronic kidney disease      Plan      Recommend she continue strengthening exercises  Proceed with fistulogram with BAM

## 2021-07-08 NOTE — H&P
Patient Care Team:  Nadya Peñaloza MD as PCP - General (Pediatrics)      History and Physical T/B non maturing, figsulogram with BAM    Bryan Clarke is a 70-year-old female who has a history of stage IV chronic kidney disease. She recently underwent a left brachiocephalic AV fistula creation by Dr. Pau Craig. She comes in today for evaluation of her fistula. She denies any pain numbness or tingling in her left arm or hand. No reports of fever or chills      Enrike Diaz is a 64 y.o. female with the following history reviewed and recorded in [facility]:  Patient Active Problem List    Diagnosis Date Noted    Allergic rhinitis with postnasal drip 04/06/2021    Deep vein thrombosis of lower extremity ([facility] [address] 10/09/2019    Disease of liver 10/09/2019    Stage 4 chronic kidney disease ([facility] [address] 07/24/2018    Thyroid disease 02/26/2017    Sleep apnea 02/26/2017     Last Assessment & Plan:   Patient states that she currently uses a CPAP at night for obstructive sleep apnea. She states that she has noticed that she has had more drainage and sinus pressure causing throat and uvula swelling. She states that she feels like she is gagging or choking at times while she is trying to sleep. She is concerned and wishes to be evaluated for this. I will refer to ENT at this time. I will also treat for a possible sinus infection, unable to do steroids due to her being an insulin-dependent diabetic.       Mood disorder ([facility] [address] 02/26/2017    Type 2 DM with CKD stage 3 and hypertension ([facility] [place] [address] 02/26/2017    Hypertriglyceridemia 02/26/2017    Anxiety 02/26/2017    Vitamin D deficiency 02/26/2017    Anemia 02/26/2017    Hypocalcemia 02/26/2017    Anemia of chronic renal failure 12/12/2016    Hypertension associated with diabetes ([address] 10/17/2016    B12 deficiency 10/17/2016    Mixed diabetic hyperlipidemia associated with type 2 diabetes mellitus ([address] 10/17/2016    Morbid obesity ([address] 09/15/2011    Embolism ([address] 01/01/2004 Updating deleted diagnoses       Current Outpatient Medications   Medication Sig Dispense Refill    dexamethasone (DECADRON) 1 MG tablet Take 1 mg by mouth daily (with breakfast)      lisinopril (PRINIVIL;ZESTRIL) 40 MG tablet Take 40 mg by mouth 2 times daily Indications: High Blood Pressure Disorder      levothyroxine (SYNTHROID) 75 MCG tablet Take 75 mcg by mouth Daily Indications: Underactive Thyroid      cetirizine (ZYRTEC) 10 MG tablet Take 10 mg by mouth as needed for Allergies       diazePAM (VALIUM) 2 MG tablet Take 2 mg by mouth as needed for Anxiety.        Evolocumab 140 MG/ML SOAJ Inject 140 mg into the skin every 14 days      fluticasone (FLONASE) 50 MCG/ACT nasal spray 2 sprays by Nasal route daily as needed for Rhinitis       insulin lispro (HUMALOG KWIKPEN) 200 UNIT/ML SOPN pen Inject 25-80 Units into the skin 3 times daily (with meals)       Insulin Pen Needle (B-D ULTRAFINE III SHORT PEN) 31G X 8 MM MISC USE AS DIRECTED TO INJECT FOUR TIMES DAILY      Insulin Syringe-Needle U-100 (BD INSULIN SYRINGE U/F) 31G X 5/16\" 1 ML MISC AS DIRECTED FOUR TIMES A DAY      FreeStyle Lancets MISC Use as instructed 4 x daily      Omega-3 Fatty Acids (FISH OIL) 1000 MG CAPS Take 1,000 mg by mouth Daily with supper       potassium gluconate 550 mg tablet Take 550 mg by mouth daily       epoetin jose ramon (EPOGEN;PROCRIT) 64111 UNIT/ML injection Inject 10,000 Units into the skin every 30 days       Dulaglutide 1.5 MG/0.5ML SOPN Inject 0.75 mg into the skin once a week       lamoTRIgine (LAMICTAL) 100 MG tablet Take 100 mg by mouth daily Indications: 1/2 tablet with dinner      amLODIPine (NORVASC) 5 MG tablet Take 10 mg by mouth daily WIT LUNCH      allopurinol (ZYLOPRIM) 100 MG tablet Take 100 mg by mouth 2 times daily      Ergocalciferol (VITAMIN D2 PO) Take 50,000 Units by mouth three times a week       calcitRIOL (ROCALTROL) 0.25 MCG capsule Take 0.25 mcg by mouth every other day      Multiple Vitamins-Minerals (COMPLETE MULTIVITAMIN/MINERAL PO) Take 1 tablet by mouth daily       citalopram (CELEXA) 40 MG tablet Take 40 mg by mouth daily      rosuvastatin (CRESTOR) 10 MG tablet Take 10 mg by mouth daily      carvedilol (COREG) 3.125 MG tablet Take 3.125 mg by mouth 2 times daily (with meals)      sodium bicarbonate 650 MG tablet Take 650 mg by mouth 3 times daily       busPIRone (BUSPAR) 10 MG tablet Take 20 mg by mouth 2 times daily      aspirin 81 MG tablet Take 81 mg by mouth daily With Dinner      Insulin Glargine (LANTUS SC) Inject 90 Units into the skin daily With Evening Meals       No current facility-administered medications for this visit.      Allergies: Codeine  Past Medical History:   Diagnosis Date    Anxiety     CKD (chronic kidney disease)     stage 4    Colon polyp     Depression     Embolism - blood clot 2004    Hyperlipidemia     Hypertension     Obesity     Sleep apnea     Thyroid disease     Type II or unspecified type diabetes mellitus without mention of complication, not stated as uncontrolled      Past Surgical History:   Procedure Laterality Date    CHOLECYSTECTOMY      DIALYSIS FISTULA CREATION Left 6/18/2021    LEFT BRACHIAL-CEPHALIC AV FISTULA CREATION performed by Kacie Chavez MD at [place] [id number]  10/17/2018    POLYPECTOMY      RECTAL SURGERY      fistula repair    TONSILLECTOMY AND ADENOIDECTOMY       Family History   Problem Relation Age of Onset    Lung Cancer Mother     Brain Cancer Mother     Heart Attack Father     Prostate Cancer Father     Heart Disease Father     Stroke Father     Uterine Cancer Maternal Grandmother     Cervical Cancer Maternal Grandmother     Diabetes Maternal Grandfather     Other Maternal Grandfather         histoplasmosis    Diabetes Paternal Grandfather      Social History     Tobacco Use    Smoking status: Never Smoker    Smokeless tobacco: Never Used Substance Use Topics    Alcohol use: No     Review of Systems    Constitutional -   No fever or chills   HENT - no HA's, tinnitus, rhinorrhea, sore throat  Eyes - no sudden vision change or amaurosis. Respiratory - SOB or chest pain  Cardiovascular - no chest pain, syncope, or significant dizziness. No palpitations  (see HPI)  Gastrointestinal - no significant abdominal pain. No blood in stool. No diarrhea, nausea, or vomiting. Genitourinary - No difficulty urinating, dysuria, frequency, or urgency. No flank pain or hematuria. Musculoskeletal - no back pain or myalgia. Skin - no rashes or wounds   Neurologic - no dizziness, facial asymmetry, or light headedness. No seizures. No new onset of partial or complete loss of vision affecting only one eye, speech difficulty or lateralizing weakness, numbness/tingling   Hematologic - no excessive bleeding. Psychiatric - no severe anxiety or nervousness. No confusion. All other review of systems are negative. Physical Exam    Constitutional - No acute distress. HENT - head normocephalic. Hearing is intact   Eyes - conjunctiva normal.  EOMS normal.  No exudate. No icterus. Neck- ROM appears normal, no tracheal deviation. Cardiovascular - Regular rate and rhythm. Heart sounds are normal.  No murmur, rub, or gallop. Carotid pulses bilaterally without bruit. Extremities - Radial and ulnar pulses are 2+ to palpation bilaterally. Bruit present over fistula however the thrill is more prominent over the distal aspect of the fistula this appears to be somewhat nonmaturing. Left hand is warm pink and mobile  Pulmonary - effort appears normal.  No respiratory distress. Lungs - Breath sounds normal.   GI - Abdomen - No distension or palpable mass. Genitourinary - deferred. Musculoskeletal - ROM appears normal.  No significant edema. Neurologic - alert and oriented X 3. Face symmetric. No lateralizing weakness noted. Skin - warm, dry, and intact. No rash, erythema, or pallor. Psychiatric - mood, affect, and behavior appear normal.  Judgment and thought processes appear normal.    Options have been discussed with the patient including continued medical management vs. proceeding with Fistulogram of BAM.  Patient has opted to proceed with fistulogram with BAM.  Risks have been discussed with the patient including but not limited to MI, death, CVA, bleed, nerve injury, steal, and infection. Assessment      1.   Stage IV chronic kidney disease      Plan      Recommend she continue strengthening exercises  Proceed with fistulogram with BAM

## 2021-07-12 ENCOUNTER — HOSPITAL ENCOUNTER (OUTPATIENT)
Dept: GENERAL RADIOLOGY | Age: 61
Discharge: HOME | End: 2021-07-12
Payer: MEDICARE

## 2021-07-12 ENCOUNTER — HOSPITAL ENCOUNTER (OUTPATIENT)
Dept: INTERVENTIONAL RADIOLOGY/VASCULAR | Age: 61
Discharge: HOME | End: 2021-07-12
Payer: MEDICARE

## 2021-07-12 VITALS
HEART RATE: 68 BPM | SYSTOLIC BLOOD PRESSURE: 154 MMHG | HEIGHT: 65 IN | TEMPERATURE: 96.9 F | BODY MASS INDEX: 48.82 KG/M2 | OXYGEN SATURATION: 88 % | WEIGHT: 293 LBS | RESPIRATION RATE: 16 BRPM | DIASTOLIC BLOOD PRESSURE: 68 MMHG

## 2021-07-12 DIAGNOSIS — Z01.818 PRE-OP TESTING: Primary | ICD-10-CM

## 2021-07-12 DIAGNOSIS — Z99.2 ESRD ON DIALYSIS (HCC): ICD-10-CM

## 2021-07-12 DIAGNOSIS — N18.6 ESRD ON DIALYSIS (HCC): ICD-10-CM

## 2021-07-12 LAB
EKG P AXIS: 33 DEGREES
EKG P-R INTERVAL: 218 MS
EKG Q-T INTERVAL: 444 MS
EKG QRS DURATION: 86 MS
EKG QTC CALCULATION (BAZETT): 447 MS
EKG T AXIS: 67 DEGREES
MRSA SCREEN RT-PCR: NOT DETECTED

## 2021-07-12 PROCEDURE — 99152 MOD SED SAME PHYS/QHP 5/>YRS: CPT | Performed by: SURGERY

## 2021-07-12 PROCEDURE — 93005 ELECTROCARDIOGRAM TRACING: CPT | Performed by: SURGERY

## 2021-07-12 PROCEDURE — 6360000002 HC RX W HCPCS: Performed by: PHYSICIAN ASSISTANT

## 2021-07-12 PROCEDURE — 2709999900 IR GUIDED INTRO CATH DIALYSIS CIRCUIT

## 2021-07-12 PROCEDURE — 6360000004 HC RX CONTRAST MEDICATION: Performed by: SURGERY

## 2021-07-12 PROCEDURE — 36901 INTRO CATH DIALYSIS CIRCUIT: CPT | Performed by: SURGERY

## 2021-07-12 PROCEDURE — 6370000000 HC RX 637 (ALT 250 FOR IP): Performed by: PHYSICIAN ASSISTANT

## 2021-07-12 PROCEDURE — 99153 MOD SED SAME PHYS/QHP EA: CPT | Performed by: SURGERY

## 2021-07-12 PROCEDURE — 87641 MR-STAPH DNA AMP PROBE: CPT

## 2021-07-12 PROCEDURE — 2580000003 HC RX 258: Performed by: PHYSICIAN ASSISTANT

## 2021-07-12 PROCEDURE — 93010 ELECTROCARDIOGRAM REPORT: CPT | Performed by: INTERNAL MEDICINE

## 2021-07-12 PROCEDURE — 6360000002 HC RX W HCPCS: Performed by: SURGERY

## 2021-07-12 PROCEDURE — 71046 X-RAY EXAM CHEST 2 VIEWS: CPT

## 2021-07-12 PROCEDURE — 2500000003 HC RX 250 WO HCPCS: Performed by: SURGERY

## 2021-07-12 RX ORDER — SODIUM BICARBONATE 650 MG/1
1300 TABLET ORAL 3 TIMES DAILY
Status: ON HOLD | COMMUNITY
End: 2021-08-13 | Stop reason: HOSPADM

## 2021-07-12 RX ORDER — SODIUM CHLORIDE 0.9 % (FLUSH) 0.9 %
10 SYRINGE (ML) INJECTION PRN
Status: DISCONTINUED | OUTPATIENT
Start: 2021-07-12 | End: 2021-07-14 | Stop reason: HOSPADM

## 2021-07-12 RX ORDER — SODIUM CHLORIDE 9 MG/ML
INJECTION, SOLUTION INTRAVENOUS CONTINUOUS
Status: DISCONTINUED | OUTPATIENT
Start: 2021-07-12 | End: 2021-07-14 | Stop reason: HOSPADM

## 2021-07-12 RX ORDER — CARVEDILOL 12.5 MG/1
12.5 TABLET ORAL 2 TIMES DAILY
COMMUNITY
Start: 2021-06-14 | End: 2021-09-01 | Stop reason: ALTCHOICE

## 2021-07-12 RX ORDER — IODIXANOL 320 MG/ML
INJECTION, SOLUTION INTRAVASCULAR
Status: COMPLETED | OUTPATIENT
Start: 2021-07-12 | End: 2021-07-12

## 2021-07-12 RX ORDER — MIDAZOLAM HYDROCHLORIDE 1 MG/ML
INJECTION INTRAMUSCULAR; INTRAVENOUS
Status: COMPLETED | OUTPATIENT
Start: 2021-07-12 | End: 2021-07-12

## 2021-07-12 RX ORDER — ONDANSETRON 2 MG/ML
4 INJECTION INTRAMUSCULAR; INTRAVENOUS EVERY 8 HOURS PRN
Status: DISCONTINUED | OUTPATIENT
Start: 2021-07-12 | End: 2021-07-14 | Stop reason: HOSPADM

## 2021-07-12 RX ORDER — AMLODIPINE BESYLATE 10 MG/1
10 TABLET ORAL DAILY
COMMUNITY
Start: 2021-05-24 | End: 2021-09-01 | Stop reason: ALTCHOICE

## 2021-07-12 RX ORDER — HYDROCODONE BITARTRATE AND ACETAMINOPHEN 5; 325 MG/1; MG/1
2 TABLET ORAL EVERY 4 HOURS PRN
Status: DISCONTINUED | OUTPATIENT
Start: 2021-07-12 | End: 2021-07-14 | Stop reason: HOSPADM

## 2021-07-12 RX ORDER — HEPARIN SODIUM 5000 [USP'U]/ML
INJECTION, SOLUTION INTRAVENOUS; SUBCUTANEOUS
Status: COMPLETED | OUTPATIENT
Start: 2021-07-12 | End: 2021-07-12

## 2021-07-12 RX ORDER — FENTANYL CITRATE 50 UG/ML
INJECTION, SOLUTION INTRAMUSCULAR; INTRAVENOUS
Status: COMPLETED | OUTPATIENT
Start: 2021-07-12 | End: 2021-07-12

## 2021-07-12 RX ORDER — ASPIRIN 81 MG/1
81 TABLET ORAL ONCE
Status: COMPLETED | OUTPATIENT
Start: 2021-07-12 | End: 2021-07-12

## 2021-07-12 RX ORDER — LEVOTHYROXINE SODIUM 88 UG/1
88 TABLET ORAL DAILY
COMMUNITY
Start: 2021-05-27

## 2021-07-12 RX ORDER — SODIUM CHLORIDE 9 MG/ML
25 INJECTION, SOLUTION INTRAVENOUS PRN
Status: DISCONTINUED | OUTPATIENT
Start: 2021-07-12 | End: 2021-07-14 | Stop reason: HOSPADM

## 2021-07-12 RX ORDER — DULAGLUTIDE 3 MG/.5ML
3 INJECTION, SOLUTION SUBCUTANEOUS WEEKLY
COMMUNITY
Start: 2021-05-31 | End: 2022-09-23

## 2021-07-12 RX ORDER — ROSUVASTATIN CALCIUM 20 MG/1
20 TABLET, COATED ORAL DAILY
COMMUNITY
Start: 2021-05-31

## 2021-07-12 RX ORDER — ACETAMINOPHEN 325 MG/1
650 TABLET ORAL EVERY 4 HOURS PRN
Status: DISCONTINUED | OUTPATIENT
Start: 2021-07-12 | End: 2021-07-14 | Stop reason: HOSPADM

## 2021-07-12 RX ORDER — LIDOCAINE HYDROCHLORIDE 20 MG/ML
INJECTION, SOLUTION INFILTRATION; PERINEURAL
Status: COMPLETED | OUTPATIENT
Start: 2021-07-12 | End: 2021-07-12

## 2021-07-12 RX ORDER — HYDROCODONE BITARTRATE AND ACETAMINOPHEN 5; 325 MG/1; MG/1
1 TABLET ORAL EVERY 4 HOURS PRN
Status: DISCONTINUED | OUTPATIENT
Start: 2021-07-12 | End: 2021-07-14 | Stop reason: HOSPADM

## 2021-07-12 RX ADMIN — LIDOCAINE HYDROCHLORIDE 5 ML: 20 INJECTION, SOLUTION INFILTRATION; PERINEURAL at 14:02

## 2021-07-12 RX ADMIN — ASPIRIN 81 MG: 81 TABLET, COATED ORAL at 12:01

## 2021-07-12 RX ADMIN — FENTANYL CITRATE 25 MCG: 50 INJECTION, SOLUTION INTRAMUSCULAR; INTRAVENOUS at 14:00

## 2021-07-12 RX ADMIN — SODIUM CHLORIDE: 9 INJECTION, SOLUTION INTRAVENOUS at 11:50

## 2021-07-12 RX ADMIN — IODIXANOL 20 ML: 320 INJECTION, SOLUTION INTRAVASCULAR at 14:17

## 2021-07-12 RX ADMIN — CEFAZOLIN SODIUM 1000 MG: 1 INJECTION, POWDER, FOR SOLUTION INTRAMUSCULAR; INTRAVENOUS at 13:41

## 2021-07-12 RX ADMIN — HEPARIN SODIUM 5000 UNITS: 5000 INJECTION, SOLUTION INTRAVENOUS; SUBCUTANEOUS at 14:08

## 2021-07-12 RX ADMIN — MIDAZOLAM 0.5 MG: 1 INJECTION INTRAMUSCULAR; INTRAVENOUS at 14:00

## 2021-07-12 NOTE — INTERVAL H&P NOTE
I agree with the history and physical exam in chart. In addition, today's complete ROS was performed and the only positives are noted in the HPI. I examined the patient preoperatively and discussed the surgery, including the risks, benefits, and alternatives. They seem to understand and agree to proceed.   Jacob Troy 3

## 2021-07-12 NOTE — OP NOTE
Preprocedure diagnosis:  1. Chronic kidney disease (stage IV)  2. Nonmature left brachial artery to cephalic vein arteriovenous fistula    Post procedure diagnosis: Same    Procedure:  1. Ultrasound-guided cannulation left distal upper arm cephalic vein with 4 [place] glide sheath  2. Left upper extremity fistulogram's including venography the superior vena cava    Surgeon: Mary Evans MD    Anesthesia: Intravenous/moderate sedation plus local    Estimated blood loss: Less than 50 mL    Findings:  1. Patient has a patent left brachial artery cephalic vein arteriovenous fistula. The arteriovenous anastomosis is widely patent and around 4 to 5 mm in diameter. The cephalic vein in the distal and mid upper arm is around 7 to 8 mm in diameter. In the proximal arm its between 6 and 7 mm in diameter. The left subclavian vein, innominate vein, and superior vena cava are all fairly widely patent. 2.  I did interrogate the fistula with ultrasound. Its around 5 to 6 mm in depth near the arteriovenous anastomosis in the distal upper arm. In the mid upper arm its at least 1 cm to 1.5 cm deep. In the proximal upper arm its at least 2 cm deep. Procedure in detail:    After the patient was consented and given intravenous antibiotics, she was brought to angiography and placed on the angiography table supine position. Intravenous/moderate sedation was administered and the patient's left arm was prepped and draped in usual sterile procedure. Skin and subcutaneous tissues in the distal upper arm anesthetized lidocaine. Micropuncture needle was used to cannulate the distal upper arm cephalic vein under ultrasound guidance. Seldinger technique was utilized to place a 4 [place] glide sheath. Left upper extremity fistulogram's including venography of the supra vena cava were performed with the above findings. Of note, we were having some technical difficulty with saving the images.   Therefore, the images may not be available for review in the future. However, the above findings were noted. The plan will be to bring the patient back to surgery to perform a superficial cessation procedure and around 2 to 3 weeks from now. It is too deep to cannulate at this time.

## 2021-07-13 LAB
LAB AP CASE REPORT: NORMAL
LAB AP FLOW CYTOMETRY SUMMARY: NORMAL
PATH REPORT.FINAL DX SPEC: NORMAL
PATH REPORT.GROSS SPEC: NORMAL

## 2021-07-14 ENCOUNTER — TELEPHONE (OUTPATIENT)
Dept: VASCULAR SURGERY | Age: 61
End: 2021-07-14

## 2021-07-14 ENCOUNTER — OFFICE VISIT (OUTPATIENT)
Dept: ONCOLOGY | Facility: CLINIC | Age: 61
End: 2021-07-14

## 2021-07-14 ENCOUNTER — LAB (OUTPATIENT)
Dept: LAB | Facility: HOSPITAL | Age: 61
End: 2021-07-14

## 2021-07-14 VITALS
OXYGEN SATURATION: 96 % | HEART RATE: 76 BPM | BODY MASS INDEX: 48.82 KG/M2 | TEMPERATURE: 97.2 F | WEIGHT: 293 LBS | HEIGHT: 65 IN | RESPIRATION RATE: 16 BRPM | DIASTOLIC BLOOD PRESSURE: 88 MMHG | SYSTOLIC BLOOD PRESSURE: 182 MMHG

## 2021-07-14 DIAGNOSIS — N18.4 ANEMIA DUE TO STAGE 4 CHRONIC KIDNEY DISEASE (HCC): ICD-10-CM

## 2021-07-14 DIAGNOSIS — N18.4 STAGE 4 CHRONIC KIDNEY DISEASE (HCC): ICD-10-CM

## 2021-07-14 DIAGNOSIS — D63.1 ANEMIA DUE TO STAGE 4 CHRONIC KIDNEY DISEASE (HCC): ICD-10-CM

## 2021-07-14 DIAGNOSIS — D63.8 ANEMIA, CHRONIC DISEASE: ICD-10-CM

## 2021-07-14 DIAGNOSIS — I12.9 BENIGN HYPERTENSION WITH CHRONIC KIDNEY DISEASE, STAGE IV (HCC): ICD-10-CM

## 2021-07-14 DIAGNOSIS — N18.4 BENIGN HYPERTENSION WITH CHRONIC KIDNEY DISEASE, STAGE IV (HCC): ICD-10-CM

## 2021-07-14 DIAGNOSIS — D50.0 IRON DEFICIENCY ANEMIA DUE TO CHRONIC BLOOD LOSS: Primary | ICD-10-CM

## 2021-07-14 DIAGNOSIS — D63.1 ANEMIA DUE TO CHRONIC KIDNEY DISEASE, UNSPECIFIED CKD STAGE: ICD-10-CM

## 2021-07-14 DIAGNOSIS — N18.9 ANEMIA DUE TO CHRONIC KIDNEY DISEASE, UNSPECIFIED CKD STAGE: ICD-10-CM

## 2021-07-14 DIAGNOSIS — E66.01 CLASS 3 SEVERE OBESITY DUE TO EXCESS CALORIES WITHOUT SERIOUS COMORBIDITY IN ADULT, UNSPECIFIED BMI (HCC): ICD-10-CM

## 2021-07-14 LAB
ALBUMIN SERPL-MCNC: 3.2 G/DL (ref 3.5–5.2)
ALBUMIN/GLOB SERPL: 1.2 G/DL
ALP SERPL-CCNC: 73 U/L (ref 39–117)
ALT SERPL W P-5'-P-CCNC: 13 U/L (ref 1–33)
ANION GAP SERPL CALCULATED.3IONS-SCNC: 9 MMOL/L (ref 5–15)
AST SERPL-CCNC: 13 U/L (ref 1–32)
BASOPHILS # BLD AUTO: 0.04 10*3/MM3 (ref 0–0.2)
BASOPHILS NFR BLD AUTO: 0.4 % (ref 0–1.5)
BILIRUB SERPL-MCNC: <0.2 MG/DL (ref 0–1.2)
BUN SERPL-MCNC: 43 MG/DL (ref 8–23)
BUN/CREAT SERPL: 13 (ref 7–25)
CALCIUM SPEC-SCNC: 8.4 MG/DL (ref 8.6–10.5)
CHLORIDE SERPL-SCNC: 109 MMOL/L (ref 98–107)
CO2 SERPL-SCNC: 22 MMOL/L (ref 22–29)
CREAT SERPL-MCNC: 3.3 MG/DL (ref 0.57–1)
DEPRECATED RDW RBC AUTO: 55.1 FL (ref 37–54)
EOSINOPHIL # BLD AUTO: 0.12 10*3/MM3 (ref 0–0.4)
EOSINOPHIL NFR BLD AUTO: 1.3 % (ref 0.3–6.2)
ERYTHROCYTE [DISTWIDTH] IN BLOOD BY AUTOMATED COUNT: 15.6 % (ref 12.3–15.4)
ERYTHROCYTE [SEDIMENTATION RATE] IN BLOOD: 33 MM/HR (ref 0–20)
FERRITIN SERPL-MCNC: 440.5 NG/ML (ref 13–150)
GFR SERPL CREATININE-BSD FRML MDRD: 14 ML/MIN/1.73
GFR SERPL CREATININE-BSD FRML MDRD: ABNORMAL ML/MIN/{1.73_M2}
GLOBULIN UR ELPH-MCNC: 2.6 GM/DL
GLUCOSE SERPL-MCNC: 174 MG/DL (ref 65–99)
HCT VFR BLD AUTO: 29.8 % (ref 34–46.6)
HGB BLD-MCNC: 9.5 G/DL (ref 12–15.9)
HOLD SPECIMEN: NORMAL
IMM GRANULOCYTES # BLD AUTO: 0.04 10*3/MM3 (ref 0–0.05)
IMM GRANULOCYTES NFR BLD AUTO: 0.4 % (ref 0–0.5)
IRON 24H UR-MRATE: 83 MCG/DL (ref 37–145)
IRON SATN MFR SERPL: 31 % (ref 20–50)
LYMPHOCYTES # BLD AUTO: 0.98 10*3/MM3 (ref 0.7–3.1)
LYMPHOCYTES NFR BLD AUTO: 10.6 % (ref 19.6–45.3)
MCH RBC QN AUTO: 30.8 PG (ref 26.6–33)
MCHC RBC AUTO-ENTMCNC: 31.9 G/DL (ref 31.5–35.7)
MCV RBC AUTO: 96.8 FL (ref 79–97)
MONOCYTES # BLD AUTO: 0.35 10*3/MM3 (ref 0.1–0.9)
MONOCYTES NFR BLD AUTO: 3.8 % (ref 5–12)
NEUTROPHILS NFR BLD AUTO: 7.75 10*3/MM3 (ref 1.7–7)
NEUTROPHILS NFR BLD AUTO: 83.5 % (ref 42.7–76)
NRBC BLD AUTO-RTO: 0 /100 WBC (ref 0–0.2)
PLATELET # BLD AUTO: 152 10*3/MM3 (ref 140–450)
PMV BLD AUTO: 10.6 FL (ref 6–12)
POTASSIUM SERPL-SCNC: 5.4 MMOL/L (ref 3.5–5.2)
PROT SERPL-MCNC: 5.8 G/DL (ref 6–8.5)
RBC # BLD AUTO: 3.08 10*6/MM3 (ref 3.77–5.28)
SODIUM SERPL-SCNC: 140 MMOL/L (ref 136–145)
TIBC SERPL-MCNC: 268 MCG/DL (ref 298–536)
TRANSFERRIN SERPL-MCNC: 180 MG/DL (ref 200–360)
WBC # BLD AUTO: 9.28 10*3/MM3 (ref 3.4–10.8)

## 2021-07-14 PROCEDURE — 84466 ASSAY OF TRANSFERRIN: CPT

## 2021-07-14 PROCEDURE — 88184 FLOWCYTOMETRY/ TC 1 MARKER: CPT

## 2021-07-14 PROCEDURE — 80053 COMPREHEN METABOLIC PANEL: CPT

## 2021-07-14 PROCEDURE — 85651 RBC SED RATE NONAUTOMATED: CPT

## 2021-07-14 PROCEDURE — 83540 ASSAY OF IRON: CPT

## 2021-07-14 PROCEDURE — 99214 OFFICE O/P EST MOD 30 MIN: CPT | Performed by: INTERNAL MEDICINE

## 2021-07-14 PROCEDURE — 82728 ASSAY OF FERRITIN: CPT

## 2021-07-14 PROCEDURE — 82607 VITAMIN B-12: CPT

## 2021-07-14 PROCEDURE — 82746 ASSAY OF FOLIC ACID SERUM: CPT

## 2021-07-14 PROCEDURE — 36415 COLL VENOUS BLD VENIPUNCTURE: CPT

## 2021-07-14 PROCEDURE — 88185 FLOWCYTOMETRY/TC ADD-ON: CPT

## 2021-07-14 PROCEDURE — 85025 COMPLETE CBC W/AUTO DIFF WBC: CPT

## 2021-07-14 NOTE — TELEPHONE ENCOUNTER
I called and left a  for Sara and addressed instructions via  and made patient aware we have scheduled her for a Transposition procedure with Dr. Ruslan Green. I will mail these instructions to her home address. I asked patient to call our office with any questions. Instructions given are as follows:        Sarah Rai  1960  Surgery Directions     1. Report to the outpatient registration at [facility] on , at 6:00am for your procedure with Dr. Ruslan Green. Time is subject to change, you will receive a call from our surgery department the day prior around 2:00pm to confirm your arrival time. 2. Nothing to eat or drink after midnight the night before the procedure. 3. Please take all medications as normally scheduled to take, including heart and blood pressure medicines with a sip of water. 4. Continue Aspirin medication. 5. Hold Lisinopril medication  6. Do not take Lasix, Bumex, insulin, or any diabetic medicine the morning of the procedure. If you take insulin, you may only take 1/2 of any scheduled nightly dose, and none the morning of the procedure. You may take all regularly scheduled heart, cholesterol, and blood pressure medicines with a sip of water. 7. If you take Glucophage, Metformin, Actos Plus Met, or Glucovance,you can not take this the day of your procedure and two days after the procedure. 8. Hold Plavix/Coumadin for 0 days prior to surgery. 9. If you have sleep apnea and require C-PAP, please bring this with you to the hospital.  10. Bring a list of all of your allergies and medications with you to the hospital.  11. Please let our nurse know if you have had an allergy to iodine, shellfish, or x-ray dye. 12. Let the nurse know if you take any of the followin. Over the counter herbal supplements  2. Diclofenec, indomethacin, ketoprofen, Caridopa/levadopa, naproxen, sulindac, piroxicam, glucosamine, Chondrotin, cocchine, or methotrexate.   13. Plan to stay at the hospital for 4 - 6 hours before being released  by the physician. You will need someone to drive you home after the procedure. 14. Medications instructed to hold: Please see above. 15. Please stop at your local [facility] or pharmacy and buy a bottle of Hibiclens. Wash thoroughly with this the night before and the morning of the procedure, paying special attention to the area that will be operated on. Make sure you rinse very well. The Hibiclens should only be used prior to surgery. 16. To ensure the health and safety of our patients and staff, [address] [place] [address],[address] has implemented visitor restrictions. Only one person will be allowed to accompany you for your procedure. If you or your visitor are exhibiting signs & symptoms of illness such as fever, cough, sore throat or body aches, we ask that you reschedule your procedure to a later date after your symptoms have been resolved. 16. New policy requires that anyone who comes into the hospital will be required to wear a face mask. A cloth mask is acceptable. 18. Other Directions: Please call our office with questions [phone].           You are NOT required to have another COVID 19 test due to being vaccinated. Our pre-admissions department will contact you by phone to go over information prior to your surgery.            Unless instructed otherwise by your physician, cleanse incision/puncture site twice daily with soap and water. Apply dry gauze. Do not get in tub. Okay to shower. Do not apply any salve, cream, peroxide or alcohol to the incision.   Call with any increasing redness or drainage.               PLEASE NOTE:  If the patient is unable to sign his/her own paperwork, the appointed caregiver (POA, child, sibling, etc) must be present at the time of registration for all testing and procedures.     Transportation to and from all testing and procedure appointments is the sole responsibility of the patient, caregiver, and/or nursing facility in which they reside. Please remember you will not be able to drive after you are discharged.     Please call the office at [phone] with any questions or concerns. Please allow 48-72 hours notice for cancellations or rescheduling.  We will attempt to accommodate your needs to the best of our capabilities, however, strict policies with procedure room availability does not allow much flexibility.

## 2021-07-14 NOTE — TELEPHONE ENCOUNTER
----- Message from Samanta Ruff PA-C sent at 7/14/2021 10:13 AM CDT -----  Anesthesia to review as compared to previous

## 2021-07-14 NOTE — TELEPHONE ENCOUNTER
Anna Karimi RN in pre-admit testing to ask for the pt's recent EKG to be sent to anesthesia for review. Vern Ladd will take care of this and let us know what their recommendation is.

## 2021-07-14 NOTE — TELEPHONE ENCOUNTER
Mode Xiao RN returned my call to let me know per Dr. Livingston Bussing with anesthesia the pt is okay to proceed with surgery as scheduled. Loc Nugent PA-C notified.

## 2021-07-14 NOTE — PROGRESS NOTES
[facility] HEMATOLOGY AND ONCOLOGY  [address]  [place] [place] [facility] [address] [phone]    Chief Complaint  Anemia, chronic disease (follow up)    REASON FOR VISIT: Maria C Shrestha is a 60-year-old female here for follow-up of anemia due to chronic kidney disease stage IV.  She sees nephrologist Dr. Davis.  She has been receiving Procrit when she meets guidelines.  Her last Procrit injection was on 12/21/2020.  She has also had iron infusions in the past with the last infusions of Injectafer being on 7/17/2020 and 7/31/2020.     At last appointment patient had stated that Dr. Davis told her in December that he was going to get her ready for dialysis.  Today she states she has an appointment with Dr. Davis on April 7, and at that time they will discuss possible dialysis.     Patient states she is still having the same symptoms as she was experiencing at her last office visit which is extreme fatigue, generalized weakness, sinus congestion and allergies.  No sinus pressure upon physical examination.  Patient states she is taking Zyrtec and using Flonase twice daily.  She states her throat feels swollen in the back especially in the morning after using CPAP all night.  She states that the reservoir is dry by morning.  Patient has not seen her primary care physician Dr. Soliman regarding this issue.  Encouraged patient to make appointment to be seen by Dr. Soliman.  At last appointment diabetes management was discussed with patient.  She states she has been trying to make small changes in her diet since her last visit.  Still dealing with depression and problems managing her diabetes.  Patient has lost 6 pounds since last office visit which she states that she believes is fluid loss.  She does have lower extremity edema that is nonpitting.  She is still using a wheelchair to move around as she is not able to walk more than a few feet.  She denies  "abdominal pain, fever, chills, night sweats, diarrhea, constipation, nausea/vomiting.     Labs today include hemoglobin 10.3, hematocrit 32.3, GFR 19, iron saturation 22, ferritin 466.10, and platelets 212,000.  Patient meets guidelines to receive Procrit today.     On follow up 3/17/2021: Patient last received Procrit on 2/17/2021. Patient is seeing ENT next month for throat issues. States she has gained 10 pounds since last visit, and is holding a lot fluid.  Lung sounds are clear, normal heart sounds heard, and she does have 1+ pitting edema in her lower extremities bilaterally.  She is scheduled to see nephrologist Dr. Davis next week.  She states she still has fatigue specially upon walking short distances, and still has shortness of breath with exertion.  Denies chills, fever, nausea/vomiting.     In early visit,  her labs shared with patient in office.  Hemoglobin 9.8, hematocrit 29.8, platelets 186,000, blood glucose 207, iron saturation 16, ferritin 322.40.  As patient's iron saturation is less than 20 patient is not eligible for Procrit today.  Patient is to receive 1 dose of Injectafer 750 mg today.  Patient has had Injectafer in the past as she was unable to tolerate oral iron pills.  Will reassess labs in 1 month with possible Procrit injection.        On follow up 4/14/2021: Patient presents feeling \"okay, but tired\". She complains of bilateral edema in lower extremities and fatigue. She denies fever, chills, nausea, vomiting, weight loss.   She states she has been fluctuating 10 pounds due to fluid build up. Left leg swells larger than right.  Edema decreases after elevating legs.  Recently met with nephrologist Dr. Davis, and he states they are still working towards dialysis. Discussed with patient to decrease salt intake including canned foods.  Patient encouraged to continue drinking water and continue decreasing soda intake.  Continue elevating legs.  Encouraged patient to discuss with her " "pharmacy her options of possibly special ordering compression stockings to help with her lower extremity edema.  Pitting edema present bilaterally in lower extremities.     Her labs today do meet the guidelines to receive Procrit.  Labs shared with patient in office.  Her hemoglobin has increased to 10, hematocrit 30.2, GFR decreased to 15, iron saturation 21, ferritin high at 538.70 (patient had Injectafer infusion 1 month ago).      She had IV FE and with this and procrit it has increased to 11 ranges    Now this is a FU since.       Oncology/Hematology History    No history exists.         PAST MEDICAL HISTORY:  ALLERGIES:  Allergies   Allergen Reactions   • Codeine Nausea Only and Nausea And Vomiting     N/v/felt hot     CURRENT MEDICATIONS:  Outpatient Encounter Medications as of 7/14/2021   Medication Sig Dispense Refill   • allopurinol (ZYLOPRIM) 100 MG tablet Take 100 mg by mouth 2 (Two) Times a Day.     • amLODIPine (NORVASC) 10 MG tablet Take 10 mg by mouth.     • aspirin 81 MG tablet Take 81 mg by mouth Daily. Stop 7/22/2018     • azelastine (ASTELIN) 0.1 % nasal spray 2 sprays into the nostril(s) as directed by provider 2 (Two) Times a Day. Use in each nostril as directed 30 mL 11   • BD Insulin Syringe U/F 31G X 5/16\" 1 ML misc AS DIRECTED FOUR TIMES A  each 1   • busPIRone (BUSPAR) 10 MG tablet TAKE 2 TABLETS TWICE A DAY AS NEEDED 360 tablet 3   • carvedilol (COREG) 12.5 MG tablet Take 12.5 mg by mouth 2 (Two) Times a Day With Meals.     • cetirizine (zyrTEC) 10 MG tablet Take 10 mg by mouth Daily.     • Cholecalciferol (VITAMIN D3) 08632 units tablet Take 1 capsule by mouth 2 (Two) Times a Week.     • citalopram (CeleXA) 40 MG tablet TAKE 1 TABLET DAILY 90 tablet 3   • dexamethasone (DECADRON) 1 MG tablet Take 1 tablet by mouth Daily for 14 days. 14 tablet 2   • diazePAM (VALIUM) 2 MG tablet Take 2 mg by mouth As Needed for Anxiety.     • Dulaglutide (Trulicity) 3 MG/0.5ML solution pen-injector " Inject 3 mg under the skin into the appropriate area as directed 1 (One) Time Per Week. 12 pen 3   • epoetin elsa (EPOGEN,PROCRIT) 03177 UNIT/ML injection Inject 10,000 Units under the skin into the appropriate area as directed Every 30 (Thirty) Days.     • Evolocumab (REPATHA) solution auto-injector SureClick injection Inject 140 mg under the skin into the appropriate area as directed Every 14 (Fourteen) Days.     • Evolocumab 140 MG/ML solution auto-injector Inject 1 mL under the skin into the appropriate area as directed Every 14 (Fourteen) Days for 24 doses. NDC [address]-17 6 mL 10   • fluticasone (FLONASE) 50 MCG/ACT nasal spray 1 spray into the nostril(s) as directed by provider 2 (Two) Times a Day.     • Glucose Blood (BLOOD GLUCOSE TEST) strip Use 4 x daily, use any brand covered by insurance or same brand as before 360 each 3   • insulin glargine (Lantus) 100 UNIT/ML injection INJECT 200 UNITS DAILY (Patient taking differently: Inject 90 Units under the skin into the appropriate area as directed Daily. INJECT 200 UNITS DAILY) 60 mL 11   • Insulin Lispro (HumaLOG KwikPen) 200 UNIT/ML solution pen-injector Inject 80 Units under the skin into the appropriate area as directed 3 (Three) Times a Day With Meals. 24 pen 11   • Insulin Pen Needle (B-D ULTRAFINE III SHORT PEN) 31G X 8 MM misc USE AS DIRECTED TO INJECT FOUR TIMES DAILY 200 each 2   • lamoTRIgine (LaMICtal) 100 MG tablet TAKE ONE-HALF (1/2) TABLET DAILY 45 tablet 3   • Lancets (freestyle) lancets Use as instructed 4 x daily 150 each 11   • levothyroxine (SYNTHROID, LEVOTHROID) 88 MCG tablet Take 1 tablet by mouth Daily. 90 tablet 3   • lisinopril (PRINIVIL,ZESTRIL) 40 MG tablet Take 40 mg by mouth 2 (Two) Times a Day.     • Multiple Vitamins-Minerals (MULTIVITAMIN ADULT PO) Take 1 tablet by mouth Daily.     • mupirocin (BACTROBAN) 2 % ointment Apply  topically to the appropriate area as directed 3 (Three) Times a Day. (Patient taking differently:  Apply 11 application topically to the appropriate area as directed As Needed.) 22 g 0   • Omega-3 Fatty Acids (FISH OIL) 1000 MG capsule capsule Take 2,000 mg by mouth Daily With Dinner.     • potassium gluconate 595 (99 K) MG tablet tablet potassium gluconate 595 mg (99 mg) tablet   Take 1 tablets every day by oral route.     • rosuvastatin (CRESTOR) 20 MG tablet      • sodium bicarbonate 650 MG tablet Take 1,300 mg by mouth 3 (Three) Times a Day.     • vitamin D (ERGOCALCIFEROL) 1.25 MG (11499 UT) capsule capsule Take 50,000 Units by mouth 1 (One) Time Per Week. 3x weekly       No facility-administered encounter medications on file as of 7/14/2021.     ADULT ILLNESSES:  Patient Active Problem List   Diagnosis Code   • Type 2 diabetes mellitus with stage 3 chronic kidney disease, with long-term current use of insulin (CMS/[facility]) E11.22, N18.30, Z79.4   • Mixed diabetic hyperlipidemia associated with type 2 diabetes mellitus (CMS/[facility]) E11.69, E78.2   • Hypertension associated with diabetes (CMS/[facility]) E11.59, I15.2   • Vitamin D deficiency E55.9   • B12 deficiency E53.8   • Anemia in CKD (chronic kidney disease) N18.9, D63.1   • Acquired hypothyroidism E03.9   • Chronic kidney disease, stage 4 (severe) (CMS/HCC) N18.4   • Anemia D64.9   • Anxiety F41.9   • Deep venous thrombosis of lower extremity (CMS/[facility]) I82.409   • Disease of liver K76.9   • Embolism (CMS/[facility]) I74.9   • Hypertriglyceridemia E78.1   • Hypocalcemia E83.51   • Mood disorder (CMS/HCC) F39   • Obesity E66.9   • Sleep apnea G47.30   • Acute renal failure superimposed on stage 3 chronic kidney disease (CMS/HCC) N17.9, N18.30   • Anemia of chronic renal failure N18.9, D63.1   • Benign hypertension with chronic kidney disease, stage IV (CMS/HCC) I12.9, N18.4   • Diabetes mellitus (CMS/HCC) E11.9   • Thyroid disease E07.9   • Hypothyroidism E03.9   • Vitamin D deficiency E55.9   • Anemia due to stage 4 chronic kidney disease (CMS/HCC) N18.4, D63.1   • Stage 4  chronic kidney disease (CMS/[facility]) N18.4   • Allergic rhinitis with postnasal drip J30.9, R09.82   • Bone marrow suppression D75.89   • Abnormal coagulation profile  R79.1   • Anemia, chronic disease D63.8   • Class 3 severe obesity due to excess calories without serious comorbidity in adult (CMS/[facility]) E66.01     SURGERIES:  Past Surgical History:   Procedure Laterality Date   • ADENOIDECTOMY     • ANAL FISTULA REPAIR      x 2    • CHOLECYSTECTOMY     • COLONOSCOPY  01/02/2014   • COLONOSCOPY N/A 3/22/2017    Procedure: COLONOSCOPY WITH ANESTHESIA;  Surgeon: Mono Linder MD;  Location: [facility] ENDOSCOPY;  Service:    • D & C HYSTEROSCOPY N/A 7/25/2018    Procedure: DILATATION AND CURETTAGE HYSTEROSCOPY;  Surgeon: Michael Castaneda MD;  Location: [facility] OR;  Service: Obstetrics/Gynecology   • DILATATION AND CURETTAGE      X 2   • ENDOSCOPY     • HYSTERECTOMY     • TONSILLECTOMY       HEALTH MAINTENANCE ITEMS:  Health Maintenance Due   Topic Date Due   • Pneumococcal Vaccine 0-64 (1 of 3 - PCV13) 04/05/1966   • TDAP/TD VACCINES (1 - Tdap) Never done   • ZOSTER VACCINE (1 of 2) Never done   • HEPATITIS C SCREENING  Never done   • ANNUAL WELLNESS VISIT  Never done   • DIABETIC FOOT EXAM  Never done   • PAP SMEAR  Never done   • DXA SCAN  11/14/2020       <no information>  Last Completed Colonoscopy     This patient has no relevant Health Maintenance data.        Immunization History   Administered Date(s) Administered   • COVID-19 (CAMILA) 03/09/2021   • Flu Mist 11/06/2014   • Pneumococcal, Unspecified 01/01/2015     Last Completed Mammogram          MAMMOGRAM (Every 2 Years) Next due on 8/12/2022 08/12/2020  Mammo Screening Digital Tomosynthesis Bilateral With CAD    05/16/2018  Mammo Screening Digital Tomosynthesis Bilateral With CAD    01/20/2015  MAMMOGRAPHY SCREENING BILATERAL                  FAMILY HISTORY:  Family History   Problem Relation Age of Onset   • Diabetes Other    • Heart failure Other    •  "Cancer Other    • Kidney disease Other    • Cancer Mother    • Cancer Father    • Heart disease Father    • Diabetes Father    • Obesity Father    • Stroke Father    • Cancer Maternal Grandmother    • Uterine cancer Maternal Grandmother    • Diabetes Maternal Grandfather    • Cancer Paternal Grandmother    • Colon cancer Paternal Grandmother    • Diabetes Paternal Grandfather    • Heart disease Paternal Grandfather    • No Known Problems Sister    • No Known Problems Brother    • No Known Problems Daughter    • No Known Problems Maternal Aunt    • No Known Problems Paternal Aunt    • BRCA 1/2 Neg Hx    • Breast cancer Neg Hx    • Endometrial cancer Neg Hx    • Ovarian cancer Neg Hx      SOCIAL HISTORY:  Social History     Socioeconomic History   • Marital status:      Spouse name: Not on file   • Number of children: Not on file   • Years of education: Not on file   • Highest education level: Not on file   Tobacco Use   • Smoking status: Never Smoker   • Smokeless tobacco: Never Used   Vaping Use   • Vaping Use: Never used   Substance and Sexual Activity   • Alcohol use: No   • Drug use: No   • Sexual activity: Never     Birth control/protection: Post-menopausal       REVIEW OF SYSTEMS:  Review of Systems   Constitutional: Positive for activity change and fatigue.   HENT: Negative.    Eyes: Negative.    Respiratory: Negative.    Cardiovascular: Negative.    Gastrointestinal: Negative.    Endocrine: Negative.    Musculoskeletal: Positive for back pain.   Allergic/Immunologic: Negative.    Neurological: Positive for weakness.   Psychiatric/Behavioral: Positive for stress.       BP (!) 182/88   Pulse 76   Temp 97.2 °F (36.2 °C)   Resp 16   Ht 164.5 cm (64.75\")   Wt (!) 158 kg (347 lb 12.8 oz)   LMP  (LMP Unknown)   SpO2 96%   Breastfeeding No   BMI 58.32 kg/m²  Body surface area is 2.5 meters squared.  There were no vitals filed for this visit.    Objective   Vital Signs:   BP (!) 182/88   Pulse 76   " "Temp 97.2 °F (36.2 °C)   Resp 16   Ht 164.5 cm (64.75\")   Wt (!) 158 kg (347 lb 12.8 oz)   LMP  (LMP Unknown)   SpO2 96%   Breastfeeding No   BMI 58.32 kg/m²  Body surface area is 2.5 meters squared.  There were no vitals filed for this visit.  Maria C Shrestha reports a pain score of .  Given her pain assessment as noted, treatment options were discussed and the following options were decided upon as a follow-up plan to address the patient's pain: .      Patient's Body mass index is 58.32 kg/m². indicating that she is     Physical Exam  Constitutional:       Appearance: Normal appearance.   HENT:      Head: Atraumatic.      Mouth/Throat:      Mouth: Mucous membranes are dry.   Cardiovascular:      Rate and Rhythm: Normal rate.   Pulmonary:      Effort: Pulmonary effort is normal.   Abdominal:      Palpations: Abdomen is soft.   Musculoskeletal:         General: Normal range of motion.      Cervical back: Normal range of motion.   Skin:     General: Skin is dry.   Neurological:      General: No focal deficit present.      Mental Status: She is oriented to person, place, and time.   Psychiatric:         Mood and Affect: Mood normal.         Behavior: Behavior normal.            Result Review :     LABS    Lab Results - Last 18 Months   Lab Units 07/14/21  1524 06/30/21  1102 06/23/21  0805 06/18/21  0620 06/16/21  0955 05/12/21  1244 04/14/21  1340 03/17/21  1301   HEMOGLOBIN g/dL 9.5* 10.4* 10.3* 11.4* 10.2* 10.8* 10.0* 9.8*   HEMATOCRIT % 29.8* 32.9* 32.1* 35.3* 32.3* 33.3* 30.2* 29.8*   MCV fL 96.8 95.1 92.8 93.4 94.4 92.0 91.0 92.3   WBC 10*3/mm3 9.28 10.91* 11.02* 10.9* 8.4 8.29 9.35 9.36   RDW % 15.6* 15.6* 15.6* 15.5* 15.3* 15.2 14.8 15.3   MPV fL 10.6 10.3 10.2 9.9 10.6 9.9 10.0 10.2   PLATELETS 10*3/mm3 152 190 211 223 203 219 209 186   IMM GRAN % % 0.4 0.5 0.8*  --   --  0.6* 0.6* 0.4   NEUTROS ABS 10*3/mm3 7.75* 8.00* 7.59*  --   --  6.49 6.67 6.86   LYMPHS ABS 10*3/mm3 0.98 2.10 2.18  --   --  1.03 " 1.82 1.72   MONOS ABS 10*3/mm3 0.35 0.58 0.72  --   --  0.44 0.56 0.44   EOS ABS 10*3/mm3 0.12 0.14 0.39  --   --  0.24 0.20 0.26   BASOS ABS 10*3/mm3 0.04 0.04 0.05  --   --  0.04 0.04 0.04   IMMATURE GRANS (ABS) 10*3/mm3 0.04 0.05 0.09*  --   --  0.05 0.06* 0.04   NRBC /100 WBC 0.0 0.0 0.0  --   --  0.0 0.0 0.0       Lab Results - Last 18 Months   Lab Units 07/14/21  1524 06/30/21  1102 06/18/21  0621 06/16/21  0955 05/12/21  1244 04/14/21  1340 03/17/21  1301 03/17/21  0904   GLUCOSE mg/dL 174* 130* 185* 81 193* 126* 207* 145*   SODIUM mmol/L 140 144 137 140 140 143 141 138   POTASSIUM mmol/L 5.4* 4.1 4.5 4.0 4.2 3.4* 4.0 3.8   TOTAL CO2 mmol/L  --   --  20* 21*  --   --   --   --    CO2 mmol/L 22.0 22.0  --   --  19.0* 24.0 22.0 22.0*   CHLORIDE mmol/L 109* 112* 103 108 110* 106 107 108   ANION GAP mmol/L 9.0 10.0 14 11 11.0 13.0 12.0 8.0   CREATININE mg/dL 3.30* 2.92* 3.3* 3.2* 3.28* 3.21* 2.75* 2.74*   BUN mg/dL 43* 45* 45* 34* 42* 28* 34* 33*   BUN / CREAT RATIO  13.0 15.4  --   --  12.8 8.7 12.4 12.0   CALCIUM mg/dL 8.4* 8.4* 8.4* 8.6* 8.8 8.0* 8.5* 8.1*  8.5   EGFR IF NONAFRICN AM mL/min/1.73 14* 16* 14* 15* 14* 15* 18* 18*   ALK PHOS U/L 73 80 104  --  88 85 89 89   TOTAL PROTEIN g/dL 5.8* 5.8* 7.4  --  6.2 6.3 6.4 6.7   ALT (SGPT) U/L 13 21 8  --  7 7 9 10   AST (SGOT) U/L 13 19 12  --  10 11 14 18   BILIRUBIN mg/dL <0.2 0.2 <0.2  --  0.2 0.2 <0.2 0.2   ALBUMIN g/dL 3.20* 2.80* 3.5  --  3.10* 3.10* 3.10* 3.30*   GLOBULIN gm/dL 2.6 3.0  --   --  3.1 3.2 3.3 3.4       Lab Results - Last 18 Months   Lab Units 05/12/21  1244   REFERENCE LAB REPORT  See Attached Report       Lab Results - Last 18 Months   Lab Units 07/14/21  1524 06/30/21  1102 05/12/21  1244 04/14/21  1340 03/17/21  1301 03/17/21  0904 02/17/21  1253 10/14/20  1023 07/15/20  1006 04/13/20  1038   IRON mcg/dL 83  --  54 55 42  --  66 90  --   --    TIBC mcg/dL 268*  --  244* 268* 261*  --  297* 273*  --   --    IRON SATURATION % 31  --  22 21  16*  --  22 33  --   --    FERRITIN ng/mL 440.50* 423.10* 462.30* 538.70* 322.40*  --  466.10* 405.70*  --   --    TSH uIU/mL  --   --   --   --   --  4.940*  --  2.650 3.010 7.250*              Assessment and Plan    Problem List Items Addressed This Visit        Other    Anemia - Primary    Current Assessment & Plan     1. She will be monitored for possible blood loss  2. She is on IV FE program         Relevant Orders    CBC & Differential    Comprehensive Metabolic Panel    Sedimentation Rate    Benign hypertension with chronic kidney disease, stage IV (CMS/HCC)    Anemia due to stage 4 chronic kidney disease (CMS/HCC)    Current Assessment & Plan     1. Her Cr has been improved with steroid         Anemia, chronic disease    Current Assessment & Plan     1. Will cont with steroid for now         Class 3 severe obesity due to excess calories without serious comorbidity in adult (CMS/HCC)        In summary  1. Will cont with steroid, anemia of chronic DS  2, Will consider EPOGEN    Follow Up     Patient was given instructions and counseling regarding her condition or for health maintenance advice. Please see specific information pulled into the AVS if appropriate.

## 2021-07-15 LAB
FOLATE SERPL-MCNC: 3.29 NG/ML (ref 4.78–24.2)
VIT B12 BLD-MCNC: 408 PG/ML (ref 211–946)

## 2021-07-19 LAB — REF LAB TEST METHOD: NORMAL

## 2021-07-21 ENCOUNTER — LAB (OUTPATIENT)
Dept: LAB | Facility: HOSPITAL | Age: 61
End: 2021-07-21

## 2021-07-21 LAB
25(OH)D3 SERPL-MCNC: 28.4 NG/ML
ALBUMIN SERPL-MCNC: 2.9 G/DL (ref 3.5–5)
ALBUMIN UR-MCNC: 412.7 MG/DL
ALBUMIN/GLOB SERPL: 1.1 G/DL (ref 1.1–2.5)
ALP SERPL-CCNC: 72 U/L (ref 24–120)
ALT SERPL W P-5'-P-CCNC: 14 U/L (ref 0–35)
ANION GAP SERPL CALCULATED.3IONS-SCNC: 4 MMOL/L (ref 4–13)
AST SERPL-CCNC: 16 U/L (ref 7–45)
AUTO MIXED CELLS #: 0.5 10*3/MM3 (ref 0.1–2.6)
AUTO MIXED CELLS %: 6.6 % (ref 0.1–24)
BILIRUB SERPL-MCNC: 0.1 MG/DL (ref 0.1–1)
BUN SERPL-MCNC: 40 MG/DL (ref 5–21)
BUN/CREAT SERPL: 11.6
CALCIUM SPEC-SCNC: 8.1 MG/DL (ref 8.6–10.5)
CALCIUM SPEC-SCNC: 8.4 MG/DL (ref 8.4–10.4)
CHLORIDE SERPL-SCNC: 112 MMOL/L (ref 98–110)
CHOLEST SERPL-MCNC: 101 MG/DL (ref 130–200)
CO2 SERPL-SCNC: 22 MMOL/L (ref 24–31)
CREAT SERPL-MCNC: 3.45 MG/DL (ref 0.5–1.4)
CREAT UR-MCNC: 67.9 MG/DL
ERYTHROCYTE [DISTWIDTH] IN BLOOD BY AUTOMATED COUNT: 14.8 % (ref 12.3–15.4)
GFR SERPL CREATININE-BSD FRML MDRD: 14 ML/MIN/1.73
GFR SERPL CREATININE-BSD FRML MDRD: ABNORMAL ML/MIN/{1.73_M2}
GLOBULIN UR ELPH-MCNC: 2.6 GM/DL
GLUCOSE SERPL-MCNC: 136 MG/DL (ref 70–100)
HBA1C MFR BLD: 7 % (ref 4.8–5.9)
HCT VFR BLD AUTO: 28.4 % (ref 34–46.6)
HDLC SERPL-MCNC: 53 MG/DL
HGB BLD-MCNC: 9.3 G/DL (ref 12–15.9)
LDLC SERPL CALC-MCNC: 27 MG/DL (ref 0–99)
LDLC/HDLC SERPL: 0.47 {RATIO}
LYMPHOCYTES # BLD AUTO: 1.5 10*3/MM3 (ref 0.7–3.1)
LYMPHOCYTES NFR BLD AUTO: 21.3 % (ref 19.6–45.3)
MCH RBC QN AUTO: 30.6 PG (ref 26.6–33)
MCHC RBC AUTO-ENTMCNC: 32.7 G/DL (ref 31.5–35.7)
MCV RBC AUTO: 93.4 FL (ref 79–97)
MICROALBUMIN/CREAT UR: 6078.1 MG/G
NEUTROPHILS NFR BLD AUTO: 4.9 10*3/MM3 (ref 1.7–7)
NEUTROPHILS NFR BLD AUTO: 72.1 % (ref 42.7–76)
PLATELET # BLD AUTO: 147 10*3/MM3 (ref 140–450)
PMV BLD AUTO: 9.4 FL (ref 6–12)
POTASSIUM SERPL-SCNC: 4.3 MMOL/L (ref 3.5–5.3)
PROT SERPL-MCNC: 5.5 G/DL (ref 6.3–8.7)
PTH-INTACT SERPL-MCNC: 196 PG/ML (ref 15–65)
RBC # BLD AUTO: 3.04 10*6/MM3 (ref 3.77–5.28)
SODIUM SERPL-SCNC: 138 MMOL/L (ref 135–145)
TRIGL SERPL-MCNC: 116 MG/DL (ref 0–149)
TSH SERPL DL<=0.05 MIU/L-ACNC: 2.41 UIU/ML (ref 0.27–4.2)
VIT B12 BLD-MCNC: 455 PG/ML (ref 211–946)
VLDLC SERPL-MCNC: 21 MG/DL (ref 5–40)
WBC # BLD AUTO: 6.9 10*3/MM3 (ref 3.4–10.8)

## 2021-07-21 PROCEDURE — 80053 COMPREHEN METABOLIC PANEL: CPT | Performed by: INTERNAL MEDICINE

## 2021-07-21 PROCEDURE — 80061 LIPID PANEL: CPT | Performed by: INTERNAL MEDICINE

## 2021-07-21 PROCEDURE — 82607 VITAMIN B-12: CPT | Performed by: INTERNAL MEDICINE

## 2021-07-21 PROCEDURE — 83970 ASSAY OF PARATHORMONE: CPT | Performed by: INTERNAL MEDICINE

## 2021-07-21 PROCEDURE — 85025 COMPLETE CBC W/AUTO DIFF WBC: CPT | Performed by: INTERNAL MEDICINE

## 2021-07-21 PROCEDURE — 82570 ASSAY OF URINE CREATININE: CPT | Performed by: INTERNAL MEDICINE

## 2021-07-21 PROCEDURE — 82043 UR ALBUMIN QUANTITATIVE: CPT | Performed by: INTERNAL MEDICINE

## 2021-07-21 PROCEDURE — 83036 HEMOGLOBIN GLYCOSYLATED A1C: CPT | Performed by: INTERNAL MEDICINE

## 2021-07-21 PROCEDURE — 82306 VITAMIN D 25 HYDROXY: CPT | Performed by: INTERNAL MEDICINE

## 2021-07-21 PROCEDURE — 84443 ASSAY THYROID STIM HORMONE: CPT | Performed by: INTERNAL MEDICINE

## 2021-07-21 PROCEDURE — 36415 COLL VENOUS BLD VENIPUNCTURE: CPT | Performed by: INTERNAL MEDICINE

## 2021-07-26 ENCOUNTER — PREP FOR PROCEDURE (OUTPATIENT)
Dept: VASCULAR SURGERY | Age: 61
End: 2021-07-26

## 2021-07-26 RX ORDER — SODIUM CHLORIDE 9 MG/ML
25 INJECTION, SOLUTION INTRAVENOUS PRN
Status: CANCELLED | OUTPATIENT
Start: 2021-07-26

## 2021-07-26 RX ORDER — SODIUM CHLORIDE 0.9 % (FLUSH) 0.9 %
5-40 SYRINGE (ML) INJECTION EVERY 12 HOURS SCHEDULED
Status: CANCELLED | OUTPATIENT
Start: 2021-07-26

## 2021-07-26 RX ORDER — SODIUM CHLORIDE 0.9 % (FLUSH) 0.9 %
5-40 SYRINGE (ML) INJECTION PRN
Status: CANCELLED | OUTPATIENT
Start: 2021-07-26

## 2021-07-26 RX ORDER — ASPIRIN 81 MG/1
81 TABLET ORAL ONCE
Status: CANCELLED | OUTPATIENT
Start: 2021-07-26 | End: 2021-07-26

## 2021-07-26 NOTE — H&P (VIEW-ONLY)
Patient Care Team:  Hilary Chaudhary MD as PCP - General (Pediatrics)      History and Physical T/B non maturing, figsulogram with BAM    Gomez Clarke is a 61-year-old female who has a history of stage IV chronic kidney disease. She recently underwent a left brachiocephalic AV fistula creation by Dr. Yaneth Alcala. She comes in today for evaluation of her fistula. She denies any pain numbness or tingling in her left arm or hand. No reports of fever or chills. Recent Fistulogram -   Findings:  1. Patient has a patent left brachial artery cephalic vein arteriovenous fistula. The arteriovenous anastomosis is widely patent and around 4 to 5 mm in diameter. The cephalic vein in the distal and mid upper arm is around 7 to 8 mm in diameter. In the proximal arm its between 6 and 7 mm in diameter. The left subclavian vein, innominate vein, and superior vena cava are all fairly widely patent. 2.  I did interrogate the fistula with ultrasound. Its around 5 to 6 mm in depth near the arteriovenous anastomosis in the distal upper arm. In the mid upper arm its at least 1 cm to 1.5 cm deep. In the proximal upper arm its at least 2 cm deep. Yadira Noyola is a 64 y.o. female with the following [facility]:  Patient Active Problem List    Diagnosis Date Noted    Allergic rhinitis with postnasal drip 04/06/2021    Deep vein thrombosis of lower extremity ([facility] 10/09/2019    Disease of liver 10/09/2019    Stage 4 chronic kidney disease ([facility] 07/24/2018    Thyroid disease 02/26/2017    Sleep apnea 02/26/2017     Last Assessment & Plan:   Patient states that she currently uses a CPAP at night for obstructive sleep apnea. She states that she has noticed that she has had more drainage and sinus pressure causing throat and uvula swelling. She states that she feels like she is gagging or choking at times while she is trying to sleep. She is concerned and wishes to be evaluated for this.   I will refer to ENT at this time. I will also treat for a possible sinus infection, unable to do steroids due to her being an insulin-dependent diabetic.  Mood disorder ([facility] 02/26/2017    Type 2 DM with CKD stage 3 and hypertension ([facility] 02/26/2017    Hypertriglyceridemia 02/26/2017    Anxiety 02/26/2017    Vitamin D deficiency 02/26/2017    Anemia 02/26/2017    Hypocalcemia 02/26/2017    Anemia of chronic renal failure 12/12/2016    Hypertension associated with diabetes ([facility] 10/17/2016    B12 deficiency 10/17/2016    Mixed diabetic hyperlipidemia associated with type 2 diabetes mellitus ([facility] 10/17/2016    Morbid obesity ([facility] 09/15/2011    Embolism ([facility] 01/01/2004     Updating deleted diagnoses       Current Outpatient Medications   Medication Sig Dispense Refill    amLODIPine (NORVASC) 10 MG tablet Take 10 mg by mouth daily       carvedilol (COREG) 12.5 MG tablet Take 12.5 mg by mouth 2 times daily       TRULICITY 3 DF/3.7HS SOPN 3 mg once a week       levothyroxine (SYNTHROID) 88 MCG tablet       rosuvastatin (CRESTOR) 20 MG tablet Take 20 mg by mouth daily       sodium bicarbonate 650 MG tablet Take 1,300 mg by mouth 3 times daily      Potassium 99 MG TABS Take 1 tablet by mouth nightly      dexamethasone (DECADRON) 1 MG tablet Take 1 mg by mouth daily (with breakfast)      lisinopril (PRINIVIL;ZESTRIL) 40 MG tablet Take 40 mg by mouth 2 times daily Indications: High Blood Pressure Disorder      cetirizine (ZYRTEC) 10 MG tablet Take 10 mg by mouth as needed for Allergies       diazePAM (VALIUM) 2 MG tablet Take 2 mg by mouth as needed for Anxiety.        Evolocumab 140 MG/ML SOAJ Inject 140 mg into the skin every 14 days      fluticasone (FLONASE) 50 MCG/ACT nasal spray 2 sprays by Nasal route daily as needed for Rhinitis       insulin lispro (HUMALOG KWIKPEN) 200 UNIT/ML SOPN pen Inject 25-80 Units into the skin 3 times daily (with meals)       Insulin Pen Needle (B-D ULTRAFINE III SHORT VASCULAR SURGERY  07/12/2021    [facility]- Ultrasound-guided cannulation left distal upper arm cephalic vein with 4 Chinese glide sheath, Left upper extremity fistulogram's including venography the superior vena cava     Family History   Problem Relation Age of Onset    Lung Cancer Mother     Brain Cancer Mother     Heart Attack Father     Prostate Cancer Father     Heart Disease Father     Stroke Father     Uterine Cancer Maternal Grandmother     Cervical Cancer Maternal Grandmother     Diabetes Maternal Grandfather     Other Maternal Grandfather         histoplasmosis    Diabetes Paternal Grandfather      Social History     Tobacco Use    Smoking status: Never Smoker    Smokeless tobacco: Never Used   Substance Use Topics    Alcohol use: No     Review of Systems    Constitutional    No fever or chills   HENT  no HA's, tinnitus, rhinorrhea, sore throat  Eyes  no sudden vision change or amaurosis. Respiratory  SOB or chest pain  Cardiovascular  no chest pain, syncope, or significant dizziness. No palpitations  (see HPI)  Gastrointestinal  no significant abdominal pain. No blood in stool. No diarrhea, nausea, or vomiting. Genitourinary  No difficulty urinating, dysuria, frequency, or urgency. No flank pain or hematuria. Musculoskeletal  no back pain or myalgia. Skin  no rashes or wounds   Neurologic  no dizziness, facial asymmetry, or light headedness. No seizures. No new onset of partial or complete loss of vision affecting only one eye, speech difficulty or lateralizing weakness, numbness/tingling   Hematologic  no excessive bleeding. Psychiatric  no severe anxiety or nervousness. No confusion. All other review of systems are negative. Physical Exam    Constitutional  No acute distress. HENT  head normocephalic. Hearing is intact   Eyes  conjunctiva normal.  EOMS normal.  No exudate. No icterus. Neck- ROM appears normal, no tracheal deviation.   Cardiovascular  Regular rate and rhythm. Heart sounds are normal.  No murmur, rub, or gallop. Carotid pulses bilaterally without bruit. Extremities - Radial and ulnar pulses are 2+ to palpation bilaterally. Bruit present over fistula however the thrill is more prominent over the distal aspect of the fistula this appears to be somewhat nonmaturing. Left hand is warm pink and mobile  Pulmonary  effort appears normal.  No respiratory distress. Lungs - Breath sounds normal.   GI - Abdomen  No distension or palpable mass. Genitourinary  deferred. Musculoskeletal  ROM appears normal.  No significant edema. Neurologic  alert and oriented X 3. Face symmetric. No lateralizing weakness noted. Skin  warm, dry, and intact. No rash, erythema, or pallor. Psychiatric  mood, affect, and behavior appear normal.  Judgment and thought processes appear normal.    Options have been discussed with the patient including continued medical management vs. proceeding with Fistulogram of BAM.  Patient has opted to proceed with fistulogram with BAM.  Risks have been discussed with the patient including but not limited to MI, death, CVA, bleed, nerve injury, steal, and infection. Assessment      1. Stage IV chronic kidney disease  2. Left brachial cephalic av fistula too deep to cannulate    Plan    Proceed with transposition of cephalic vein left upper arm. Risks have been discussed with the patient including but not limited to MI, death, CVA, bleed, nerve injury, and infection.

## 2021-07-26 NOTE — H&P
Patient Care Team:  Preet Salazar MD as PCP - General (Pediatrics)      History and Physical T/B non maturing, figsulogram with BAM    Nora Goldberg is a 72-year-old female who has a history of stage IV chronic kidney disease. She recently underwent a left brachiocephalic AV fistula creation by Dr. Gerri Noble. She comes in today for evaluation of her fistula. She denies any pain numbness or tingling in her left arm or hand. No reports of fever or chills. Recent Fistulogram -   Findings:  1. Patient has a patent left brachial artery cephalic vein arteriovenous fistula. The arteriovenous anastomosis is widely patent and around 4 to 5 mm in diameter. The cephalic vein in the distal and mid upper arm is around 7 to 8 mm in diameter. In the proximal arm its between 6 and 7 mm in diameter. The left subclavian vein, innominate vein, and superior vena cava are all fairly widely patent. 2.  I did interrogate the fistula with ultrasound. Its around 5 to 6 mm in depth near the arteriovenous anastomosis in the distal upper arm. In the mid upper arm its at least 1 cm to 1.5 cm deep. In the proximal upper arm its at least 2 cm deep. Adelaide Milligan is a 64 y.o. female with the following history [facility] and [facility] in [facility]:  Patient Active Problem List    Diagnosis Date Noted    Allergic rhinitis with postnasal drip 04/06/2021    Deep vein thrombosis of lower extremity ([facility] 10/09/2019    Disease of liver 10/09/2019    Stage 4 chronic kidney disease ([facility] 07/24/2018    Thyroid disease 02/26/2017    Sleep apnea 02/26/2017     Last Assessment & Plan:   Patient states that she currently uses a CPAP at night for obstructive sleep apnea. She states that she has noticed that she has had more drainage and sinus pressure causing throat and uvula swelling. She states that she feels like she is gagging or choking at times while she is trying to sleep. She is concerned and wishes to be evaluated for this.   I will refer to ENT at this time. I will also treat for a possible sinus infection, unable to do steroids due to her being an insulin-dependent diabetic.  Mood disorder ([facility] 02/26/2017    Type 2 DM with CKD stage 3 and hypertension ([facility] 02/26/2017    Hypertriglyceridemia 02/26/2017    Anxiety 02/26/2017    Vitamin D deficiency 02/26/2017    Anemia 02/26/2017    Hypocalcemia 02/26/2017    Anemia of chronic renal failure 12/12/2016    Hypertension associated with diabetes ([facility] 10/17/2016    B12 deficiency 10/17/2016    Mixed diabetic hyperlipidemia associated with type 2 diabetes mellitus ([facility] 10/17/2016    Morbid obesity ([facility] 09/15/2011    Embolism ([facility] 01/01/2004     Updating deleted diagnoses       Current Outpatient Medications   Medication Sig Dispense Refill    amLODIPine (NORVASC) 10 MG tablet Take 10 mg by mouth daily       carvedilol (COREG) 12.5 MG tablet Take 12.5 mg by mouth 2 times daily       TRULICITY 3 ZU/1.9UC SOPN 3 mg once a week       levothyroxine (SYNTHROID) 88 MCG tablet       rosuvastatin (CRESTOR) 20 MG tablet Take 20 mg by mouth daily       sodium bicarbonate 650 MG tablet Take 1,300 mg by mouth 3 times daily      Potassium 99 MG TABS Take 1 tablet by mouth nightly      dexamethasone (DECADRON) 1 MG tablet Take 1 mg by mouth daily (with breakfast)      lisinopril (PRINIVIL;ZESTRIL) 40 MG tablet Take 40 mg by mouth 2 times daily Indications: High Blood Pressure Disorder      cetirizine (ZYRTEC) 10 MG tablet Take 10 mg by mouth as needed for Allergies       diazePAM (VALIUM) 2 MG tablet Take 2 mg by mouth as needed for Anxiety.        Evolocumab 140 MG/ML SOAJ Inject 140 mg into the skin every 14 days      fluticasone (FLONASE) 50 MCG/ACT nasal spray 2 sprays by Nasal route daily as needed for Rhinitis       insulin lispro (HUMALOG KWIKPEN) 200 UNIT/ML SOPN pen Inject 25-80 Units into the skin 3 times daily (with meals)       Insulin Pen Needle (B-D ULTRAFINE III SHORT PEN) 31G X 8 MM MISC USE AS DIRECTED TO INJECT FOUR TIMES DAILY      Insulin Syringe-Needle U-100 (BD INSULIN SYRINGE U/F) 31G X 5/16\" 1 ML MISC AS DIRECTED FOUR TIMES A DAY      FreeStyle Lancets MISC Use as instructed 4 x daily      Omega-3 Fatty Acids (FISH OIL) 1000 MG CAPS Take 1,000 mg by mouth Daily with supper       epoetin jose ramon (EPOGEN;PROCRIT) 99110 UNIT/ML injection Inject 10,000 Units into the skin every 30 days       lamoTRIgine (LAMICTAL) 100 MG tablet Take 100 mg by mouth daily Indications: 1/2 tablet with dinner      allopurinol (ZYLOPRIM) 100 MG tablet Take 100 mg by mouth 2 times daily      Ergocalciferol (VITAMIN D2 PO) Take 50,000 Units by mouth three times a week       calcitRIOL (ROCALTROL) 0.25 MCG capsule Take 0.25 mcg by mouth every other day      citalopram (CELEXA) 40 MG tablet Take 40 mg by mouth daily      busPIRone (BUSPAR) 10 MG tablet Take 20 mg by mouth 2 times daily      aspirin 81 MG tablet Take 81 mg by mouth daily With Dinner      Insulin Glargine (LANTUS SC) Inject 90 Units into the skin daily With Evening Meals       No current facility-administered medications for this visit.      Allergies: Codeine  Past Medical History:   Diagnosis Date    Anxiety     Arthritis     CKD (chronic kidney disease)     stage 4    Colon polyp     Depression     Embolism - blood clot 2004    Hx of blood clots     Hyperlipidemia     Hypertension     Obesity     Sleep apnea     Thyroid disease     Type II or unspecified type diabetes mellitus without mention of complication, not stated as uncontrolled      Past Surgical History:   Procedure Laterality Date    CHOLECYSTECTOMY      DIALYSIS FISTULA CREATION Left 06/18/2021    LEFT BRACHIAL-CEPHALIC AV FISTULA CREATION performed by Asencion Essex, MD at [facility] 21  10/17/2018    POLYPECTOMY      RECTAL SURGERY      fistula repair    TONSILLECTOMY AND ADENOIDECTOMY      VASCULAR SURGERY  07/12/2021    [facility]- Ultrasound-guided cannulation left distal upper arm cephalic vein with 4 Albanian glide sheath, Left upper extremity fistulogram's including venography the superior vena cava     Family History   Problem Relation Age of Onset    Lung Cancer Mother     Brain Cancer Mother     Heart Attack Father     Prostate Cancer Father     Heart Disease Father     Stroke Father     Uterine Cancer Maternal Grandmother     Cervical Cancer Maternal Grandmother     Diabetes Maternal Grandfather     Other Maternal Grandfather         histoplasmosis    Diabetes Paternal Grandfather      Social History     Tobacco Use    Smoking status: Never Smoker    Smokeless tobacco: Never Used   Substance Use Topics    Alcohol use: No     Review of Systems    Constitutional -   No fever or chills   HENT - no HA's, tinnitus, rhinorrhea, sore throat  Eyes - no sudden vision change or amaurosis. Respiratory - SOB or chest pain  Cardiovascular - no chest pain, syncope, or significant dizziness. No palpitations  (see HPI)  Gastrointestinal - no significant abdominal pain. No blood in stool. No diarrhea, nausea, or vomiting. Genitourinary - No difficulty urinating, dysuria, frequency, or urgency. No flank pain or hematuria. Musculoskeletal - no back pain or myalgia. Skin - no rashes or wounds   Neurologic - no dizziness, facial asymmetry, or light headedness. No seizures. No new onset of partial or complete loss of vision affecting only one eye, speech difficulty or lateralizing weakness, numbness/tingling   Hematologic - no excessive bleeding. Psychiatric - no severe anxiety or nervousness. No confusion. All other review of systems are negative. Physical Exam    Constitutional - No acute distress. HENT - head normocephalic. Hearing is intact   Eyes - conjunctiva normal.  EOMS normal.  No exudate. No icterus. Neck- ROM appears normal, no tracheal deviation.   Cardiovascular - Regular rate and rhythm. Heart sounds are normal.  No murmur, rub, or gallop. Carotid pulses bilaterally without bruit. Extremities - Radial and ulnar pulses are 2+ to palpation bilaterally. Bruit present over fistula however the thrill is more prominent over the distal aspect of the fistula this appears to be somewhat nonmaturing. Left hand is warm pink and mobile  Pulmonary - effort appears normal.  No respiratory distress. Lungs - Breath sounds normal.   GI - Abdomen - No distension or palpable mass. Genitourinary - deferred. Musculoskeletal - ROM appears normal.  No significant edema. Neurologic - alert and oriented X 3. Face symmetric. No lateralizing weakness noted. Skin - warm, dry, and intact. No rash, erythema, or pallor. Psychiatric - mood, affect, and behavior appear normal.  Judgment and thought processes appear normal.    Options have been discussed with the patient including continued medical management vs. proceeding with Fistulogram of BAM.  Patient has opted to proceed with fistulogram with BAM.  Risks have been discussed with the patient including but not limited to MI, death, CVA, bleed, nerve injury, steal, and infection. Assessment      1. Stage IV chronic kidney disease  2. Left brachial cephalic av fistula too deep to cannulate    Plan    Proceed with transposition of cephalic vein left upper arm. Risks have been discussed with the patient including but not limited to MI, death, CVA, bleed, nerve injury, and infection.

## 2021-07-27 ENCOUNTER — ANESTHESIA (OUTPATIENT)
Dept: OPERATING ROOM | Age: 61
End: 2021-07-27
Payer: MEDICARE

## 2021-07-27 ENCOUNTER — ANESTHESIA EVENT (OUTPATIENT)
Dept: OPERATING ROOM | Age: 61
End: 2021-07-27
Payer: MEDICARE

## 2021-07-27 ENCOUNTER — HOSPITAL ENCOUNTER (OUTPATIENT)
Age: 61
Setting detail: OUTPATIENT SURGERY
Discharge: HOME | End: 2021-07-27
Attending: SURGERY | Admitting: SURGERY
Payer: MEDICARE

## 2021-07-27 VITALS — DIASTOLIC BLOOD PRESSURE: 56 MMHG | SYSTOLIC BLOOD PRESSURE: 159 MMHG | TEMPERATURE: 96 F | OXYGEN SATURATION: 100 %

## 2021-07-27 VITALS
TEMPERATURE: 97.7 F | RESPIRATION RATE: 14 BRPM | OXYGEN SATURATION: 96 % | DIASTOLIC BLOOD PRESSURE: 78 MMHG | SYSTOLIC BLOOD PRESSURE: 145 MMHG | HEIGHT: 65 IN | BODY MASS INDEX: 48.82 KG/M2 | WEIGHT: 293 LBS | HEART RATE: 55 BPM

## 2021-07-27 DIAGNOSIS — N18.4 CHRONIC KIDNEY DISEASE, STAGE 4 (SEVERE) (HCC): Primary | ICD-10-CM

## 2021-07-27 LAB
ANION GAP SERPL CALCULATED.3IONS-SCNC: 10 MMOL/L (ref 7–19)
APTT: 26 SEC (ref 26–36.2)
BUN BLDV-MCNC: 43 MG/DL (ref 8–23)
CALCIUM SERPL-MCNC: 8.9 MG/DL (ref 8.8–10.2)
CHLORIDE BLD-SCNC: 104 MMOL/L (ref 98–111)
CO2: 25 MMOL/L (ref 22–29)
CREAT SERPL-MCNC: 3.1 MG/DL (ref 0.5–0.9)
EKG P AXIS: 24 DEGREES
EKG P-R INTERVAL: 218 MS
EKG Q-T INTERVAL: 510 MS
EKG QRS DURATION: 90 MS
EKG QTC CALCULATION (BAZETT): 500 MS
EKG T AXIS: 52 DEGREES
GFR AFRICAN AMERICAN: 18
GFR NON-AFRICAN AMERICAN: 15
GLUCOSE BLD-MCNC: 116 MG/DL (ref 74–109)
HCT VFR BLD CALC: 31.5 % (ref 37–47)
HEMOGLOBIN: 10.1 G/DL (ref 12–16)
INR BLD: 0.93 (ref 0.88–1.18)
MCH RBC QN AUTO: 31.7 PG (ref 27–31)
MCHC RBC AUTO-ENTMCNC: 32.1 G/DL (ref 33–37)
MCV RBC AUTO: 98.7 FL (ref 81–99)
PDW BLD-RTO: 14.9 % (ref 11.5–14.5)
PLATELET # BLD: 189 K/UL (ref 130–400)
PMV BLD AUTO: 10.6 FL (ref 9.4–12.3)
POTASSIUM REFLEX MAGNESIUM: 4 MMOL/L (ref 3.5–4.9)
PROTHROMBIN TIME: 12.7 SEC (ref 12–14.6)
RBC # BLD: 3.19 M/UL (ref 4.2–5.4)
SODIUM BLD-SCNC: 139 MMOL/L (ref 136–145)
WBC # BLD: 10 K/UL (ref 4.8–10.8)

## 2021-07-27 PROCEDURE — 2580000003 HC RX 258: Performed by: SURGERY

## 2021-07-27 PROCEDURE — 85027 COMPLETE CBC AUTOMATED: CPT

## 2021-07-27 PROCEDURE — 3600000014 HC SURGERY LEVEL 4 ADDTL 15MIN: Performed by: SURGERY

## 2021-07-27 PROCEDURE — 6370000000 HC RX 637 (ALT 250 FOR IP): Performed by: PHYSICIAN ASSISTANT

## 2021-07-27 PROCEDURE — 7100000010 HC PHASE II RECOVERY - FIRST 15 MIN: Performed by: SURGERY

## 2021-07-27 PROCEDURE — 80048 BASIC METABOLIC PNL TOTAL CA: CPT

## 2021-07-27 PROCEDURE — 7100000000 HC PACU RECOVERY - FIRST 15 MIN: Performed by: SURGERY

## 2021-07-27 PROCEDURE — 2709999900 HC NON-CHARGEABLE SUPPLY: Performed by: SURGERY

## 2021-07-27 PROCEDURE — 85730 THROMBOPLASTIN TIME PARTIAL: CPT

## 2021-07-27 PROCEDURE — 93005 ELECTROCARDIOGRAM TRACING: CPT | Performed by: ANESTHESIOLOGY

## 2021-07-27 PROCEDURE — 3600000004 HC SURGERY LEVEL 4 BASE: Performed by: SURGERY

## 2021-07-27 PROCEDURE — 36415 COLL VENOUS BLD VENIPUNCTURE: CPT

## 2021-07-27 PROCEDURE — 36832 AV FISTULA REVISION OPEN: CPT | Performed by: SURGERY

## 2021-07-27 PROCEDURE — 3700000000 HC ANESTHESIA ATTENDED CARE: Performed by: SURGERY

## 2021-07-27 PROCEDURE — 7100000001 HC PACU RECOVERY - ADDTL 15 MIN: Performed by: SURGERY

## 2021-07-27 PROCEDURE — 6360000002 HC RX W HCPCS: Performed by: NURSE ANESTHETIST, CERTIFIED REGISTERED

## 2021-07-27 PROCEDURE — 93010 ELECTROCARDIOGRAM REPORT: CPT | Performed by: INTERNAL MEDICINE

## 2021-07-27 PROCEDURE — 2500000003 HC RX 250 WO HCPCS: Performed by: NURSE ANESTHETIST, CERTIFIED REGISTERED

## 2021-07-27 PROCEDURE — 85610 PROTHROMBIN TIME: CPT

## 2021-07-27 PROCEDURE — 6360000002 HC RX W HCPCS: Performed by: SURGERY

## 2021-07-27 PROCEDURE — 3700000001 HC ADD 15 MINUTES (ANESTHESIA): Performed by: SURGERY

## 2021-07-27 RX ORDER — HYDROCODONE BITARTRATE AND ACETAMINOPHEN 5; 325 MG/1; MG/1
2 TABLET ORAL EVERY 4 HOURS PRN
Status: DISCONTINUED | OUTPATIENT
Start: 2021-07-27 | End: 2021-07-27 | Stop reason: HOSPADM

## 2021-07-27 RX ORDER — PROMETHAZINE HYDROCHLORIDE 25 MG/ML
6.25 INJECTION, SOLUTION INTRAMUSCULAR; INTRAVENOUS
Status: DISCONTINUED | OUTPATIENT
Start: 2021-07-27 | End: 2021-07-27 | Stop reason: HOSPADM

## 2021-07-27 RX ORDER — HYDROCODONE BITARTRATE AND ACETAMINOPHEN 5; 325 MG/1; MG/1
1 TABLET ORAL EVERY 4 HOURS PRN
Status: DISCONTINUED | OUTPATIENT
Start: 2021-07-27 | End: 2021-07-27 | Stop reason: HOSPADM

## 2021-07-27 RX ORDER — LIDOCAINE HYDROCHLORIDE 10 MG/ML
INJECTION, SOLUTION EPIDURAL; INFILTRATION; INTRACAUDAL; PERINEURAL PRN
Status: DISCONTINUED | OUTPATIENT
Start: 2021-07-27 | End: 2021-07-27 | Stop reason: SDUPTHER

## 2021-07-27 RX ORDER — SODIUM CHLORIDE 0.9 % (FLUSH) 0.9 %
5-40 SYRINGE (ML) INJECTION PRN
Status: DISCONTINUED | OUTPATIENT
Start: 2021-07-27 | End: 2021-07-27 | Stop reason: HOSPADM

## 2021-07-27 RX ORDER — PROPOFOL 10 MG/ML
INJECTION, EMULSION INTRAVENOUS PRN
Status: DISCONTINUED | OUTPATIENT
Start: 2021-07-27 | End: 2021-07-27 | Stop reason: SDUPTHER

## 2021-07-27 RX ORDER — SODIUM CHLORIDE, SODIUM LACTATE, POTASSIUM CHLORIDE, CALCIUM CHLORIDE 600; 310; 30; 20 MG/100ML; MG/100ML; MG/100ML; MG/100ML
INJECTION, SOLUTION INTRAVENOUS CONTINUOUS
Status: DISCONTINUED | OUTPATIENT
Start: 2021-07-27 | End: 2021-07-27 | Stop reason: HOSPADM

## 2021-07-27 RX ORDER — SODIUM CHLORIDE 9 MG/ML
25 INJECTION, SOLUTION INTRAVENOUS PRN
Status: DISCONTINUED | OUTPATIENT
Start: 2021-07-27 | End: 2021-07-27 | Stop reason: HOSPADM

## 2021-07-27 RX ORDER — FENTANYL CITRATE 50 UG/ML
INJECTION, SOLUTION INTRAMUSCULAR; INTRAVENOUS PRN
Status: DISCONTINUED | OUTPATIENT
Start: 2021-07-27 | End: 2021-07-27 | Stop reason: SDUPTHER

## 2021-07-27 RX ORDER — MEPERIDINE HYDROCHLORIDE 50 MG/ML
12.5 INJECTION INTRAMUSCULAR; INTRAVENOUS; SUBCUTANEOUS EVERY 5 MIN PRN
Status: DISCONTINUED | OUTPATIENT
Start: 2021-07-27 | End: 2021-07-27 | Stop reason: HOSPADM

## 2021-07-27 RX ORDER — HYDRALAZINE HYDROCHLORIDE 20 MG/ML
5 INJECTION INTRAMUSCULAR; INTRAVENOUS EVERY 10 MIN PRN
Status: DISCONTINUED | OUTPATIENT
Start: 2021-07-27 | End: 2021-07-27 | Stop reason: HOSPADM

## 2021-07-27 RX ORDER — ONDANSETRON 2 MG/ML
4 INJECTION INTRAMUSCULAR; INTRAVENOUS EVERY 8 HOURS PRN
Status: DISCONTINUED | OUTPATIENT
Start: 2021-07-27 | End: 2021-07-27 | Stop reason: HOSPADM

## 2021-07-27 RX ORDER — ACETAMINOPHEN 325 MG/1
650 TABLET ORAL EVERY 4 HOURS PRN
Status: DISCONTINUED | OUTPATIENT
Start: 2021-07-27 | End: 2021-07-27 | Stop reason: HOSPADM

## 2021-07-27 RX ORDER — HYDROMORPHONE HYDROCHLORIDE 1 MG/ML
0.25 INJECTION, SOLUTION INTRAMUSCULAR; INTRAVENOUS; SUBCUTANEOUS EVERY 5 MIN PRN
Status: DISCONTINUED | OUTPATIENT
Start: 2021-07-27 | End: 2021-07-27 | Stop reason: HOSPADM

## 2021-07-27 RX ORDER — FENTANYL CITRATE 50 UG/ML
50 INJECTION, SOLUTION INTRAMUSCULAR; INTRAVENOUS
Status: DISCONTINUED | OUTPATIENT
Start: 2021-07-27 | End: 2021-07-27 | Stop reason: HOSPADM

## 2021-07-27 RX ORDER — EPHEDRINE SULFATE 50 MG/ML
INJECTION, SOLUTION INTRAVENOUS PRN
Status: DISCONTINUED | OUTPATIENT
Start: 2021-07-27 | End: 2021-07-27 | Stop reason: SDUPTHER

## 2021-07-27 RX ORDER — FENTANYL CITRATE 50 UG/ML
25 INJECTION, SOLUTION INTRAMUSCULAR; INTRAVENOUS
Status: DISCONTINUED | OUTPATIENT
Start: 2021-07-27 | End: 2021-07-27 | Stop reason: HOSPADM

## 2021-07-27 RX ORDER — FOLIC ACID 1 MG/1
1 TABLET ORAL DAILY
Qty: 30 TABLET | Refills: 2 | Status: SHIPPED | OUTPATIENT
Start: 2021-07-27 | End: 2021-08-19

## 2021-07-27 RX ORDER — ENALAPRILAT 2.5 MG/2ML
1.25 INJECTION INTRAVENOUS
Status: DISCONTINUED | OUTPATIENT
Start: 2021-07-27 | End: 2021-07-27 | Stop reason: HOSPADM

## 2021-07-27 RX ORDER — SODIUM CHLORIDE 9 MG/ML
INJECTION, SOLUTION INTRAVENOUS CONTINUOUS
Status: DISCONTINUED | OUTPATIENT
Start: 2021-07-27 | End: 2021-07-27 | Stop reason: HOSPADM

## 2021-07-27 RX ORDER — DEXAMETHASONE SODIUM PHOSPHATE 10 MG/ML
INJECTION, SOLUTION INTRAMUSCULAR; INTRAVENOUS PRN
Status: DISCONTINUED | OUTPATIENT
Start: 2021-07-27 | End: 2021-07-27 | Stop reason: SDUPTHER

## 2021-07-27 RX ORDER — HYDROMORPHONE HYDROCHLORIDE 1 MG/ML
0.5 INJECTION, SOLUTION INTRAMUSCULAR; INTRAVENOUS; SUBCUTANEOUS EVERY 5 MIN PRN
Status: DISCONTINUED | OUTPATIENT
Start: 2021-07-27 | End: 2021-07-27 | Stop reason: HOSPADM

## 2021-07-27 RX ORDER — ASPIRIN 81 MG/1
81 TABLET ORAL ONCE
Status: COMPLETED | OUTPATIENT
Start: 2021-07-27 | End: 2021-07-27

## 2021-07-27 RX ORDER — LABETALOL HYDROCHLORIDE 5 MG/ML
5 INJECTION, SOLUTION INTRAVENOUS EVERY 10 MIN PRN
Status: DISCONTINUED | OUTPATIENT
Start: 2021-07-27 | End: 2021-07-27 | Stop reason: HOSPADM

## 2021-07-27 RX ORDER — ONDANSETRON 2 MG/ML
INJECTION INTRAMUSCULAR; INTRAVENOUS PRN
Status: DISCONTINUED | OUTPATIENT
Start: 2021-07-27 | End: 2021-07-27 | Stop reason: SDUPTHER

## 2021-07-27 RX ORDER — METOCLOPRAMIDE HYDROCHLORIDE 5 MG/ML
10 INJECTION INTRAMUSCULAR; INTRAVENOUS
Status: DISCONTINUED | OUTPATIENT
Start: 2021-07-27 | End: 2021-07-27 | Stop reason: HOSPADM

## 2021-07-27 RX ORDER — LIDOCAINE HYDROCHLORIDE 10 MG/ML
1 INJECTION, SOLUTION EPIDURAL; INFILTRATION; INTRACAUDAL; PERINEURAL
Status: DISCONTINUED | OUTPATIENT
Start: 2021-07-27 | End: 2021-07-27 | Stop reason: HOSPADM

## 2021-07-27 RX ORDER — MORPHINE SULFATE 4 MG/ML
4 INJECTION, SOLUTION INTRAMUSCULAR; INTRAVENOUS EVERY 5 MIN PRN
Status: DISCONTINUED | OUTPATIENT
Start: 2021-07-27 | End: 2021-07-27 | Stop reason: HOSPADM

## 2021-07-27 RX ORDER — MIDAZOLAM HYDROCHLORIDE 1 MG/ML
2 INJECTION INTRAMUSCULAR; INTRAVENOUS
Status: DISCONTINUED | OUTPATIENT
Start: 2021-07-27 | End: 2021-07-27 | Stop reason: HOSPADM

## 2021-07-27 RX ORDER — SODIUM CHLORIDE 0.9 % (FLUSH) 0.9 %
5-40 SYRINGE (ML) INJECTION EVERY 12 HOURS SCHEDULED
Status: DISCONTINUED | OUTPATIENT
Start: 2021-07-27 | End: 2021-07-27 | Stop reason: HOSPADM

## 2021-07-27 RX ORDER — SODIUM CHLORIDE 450 MG/100ML
INJECTION, SOLUTION INTRAVENOUS CONTINUOUS
Status: DISCONTINUED | OUTPATIENT
Start: 2021-07-27 | End: 2021-07-27 | Stop reason: HOSPADM

## 2021-07-27 RX ORDER — DIPHENHYDRAMINE HYDROCHLORIDE 50 MG/ML
12.5 INJECTION INTRAMUSCULAR; INTRAVENOUS
Status: DISCONTINUED | OUTPATIENT
Start: 2021-07-27 | End: 2021-07-27 | Stop reason: HOSPADM

## 2021-07-27 RX ORDER — MORPHINE SULFATE 4 MG/ML
2 INJECTION, SOLUTION INTRAMUSCULAR; INTRAVENOUS EVERY 5 MIN PRN
Status: DISCONTINUED | OUTPATIENT
Start: 2021-07-27 | End: 2021-07-27 | Stop reason: HOSPADM

## 2021-07-27 RX ADMIN — PROPOFOL 200 MG: 10 INJECTION, EMULSION INTRAVENOUS at 07:40

## 2021-07-27 RX ADMIN — EPHEDRINE SULFATE 10 MG: 50 INJECTION INTRAMUSCULAR; INTRAVENOUS; SUBCUTANEOUS at 08:02

## 2021-07-27 RX ADMIN — LIDOCAINE HYDROCHLORIDE 50 MG: 10 INJECTION, SOLUTION EPIDURAL; INFILTRATION; INTRACAUDAL; PERINEURAL at 07:40

## 2021-07-27 RX ADMIN — SODIUM CHLORIDE: 4.5 INJECTION, SOLUTION INTRAVENOUS at 06:39

## 2021-07-27 RX ADMIN — HYDROMORPHONE HYDROCHLORIDE 0.5 MG: 1 INJECTION, SOLUTION INTRAMUSCULAR; INTRAVENOUS; SUBCUTANEOUS at 09:43

## 2021-07-27 RX ADMIN — FENTANYL CITRATE 50 MCG: 50 INJECTION, SOLUTION INTRAMUSCULAR; INTRAVENOUS at 07:40

## 2021-07-27 RX ADMIN — DEXAMETHASONE SODIUM PHOSPHATE 4 MG: 10 INJECTION, SOLUTION INTRAMUSCULAR; INTRAVENOUS at 08:30

## 2021-07-27 RX ADMIN — ONDANSETRON HYDROCHLORIDE 4 MG: 2 INJECTION, SOLUTION INTRAMUSCULAR; INTRAVENOUS at 09:15

## 2021-07-27 RX ADMIN — HYDROMORPHONE HYDROCHLORIDE 0.5 MG: 1 INJECTION, SOLUTION INTRAMUSCULAR; INTRAVENOUS; SUBCUTANEOUS at 09:39

## 2021-07-27 RX ADMIN — ASPIRIN 81 MG: 81 TABLET, COATED ORAL at 06:39

## 2021-07-27 RX ADMIN — FENTANYL CITRATE 25 MCG: 50 INJECTION, SOLUTION INTRAMUSCULAR; INTRAVENOUS at 08:54

## 2021-07-27 RX ADMIN — PROPOFOL 30 MG: 10 INJECTION, EMULSION INTRAVENOUS at 08:25

## 2021-07-27 RX ADMIN — EPHEDRINE SULFATE 10 MG: 50 INJECTION INTRAMUSCULAR; INTRAVENOUS; SUBCUTANEOUS at 08:31

## 2021-07-27 RX ADMIN — VANCOMYCIN HYDROCHLORIDE 2000 MG: 1 INJECTION, POWDER, LYOPHILIZED, FOR SOLUTION INTRAVENOUS at 06:39

## 2021-07-27 RX ADMIN — PROPOFOL 30 MG: 10 INJECTION, EMULSION INTRAVENOUS at 08:57

## 2021-07-27 ASSESSMENT — PAIN DESCRIPTION - DESCRIPTORS
DESCRIPTORS: BURNING
DESCRIPTORS: ACHING

## 2021-07-27 ASSESSMENT — PAIN SCALES - GENERAL
PAINLEVEL_OUTOF10: 3
PAINLEVEL_OUTOF10: 7
PAINLEVEL_OUTOF10: 4

## 2021-07-27 ASSESSMENT — PAIN DESCRIPTION - PAIN TYPE
TYPE: SURGICAL PAIN

## 2021-07-27 ASSESSMENT — PAIN DESCRIPTION - LOCATION
LOCATION: ARM

## 2021-07-27 ASSESSMENT — LIFESTYLE VARIABLES: SMOKING_STATUS: 0

## 2021-07-27 ASSESSMENT — PAIN DESCRIPTION - ORIENTATION
ORIENTATION: LEFT;UPPER
ORIENTATION: LEFT;UPPER
ORIENTATION: LEFT

## 2021-07-27 ASSESSMENT — ENCOUNTER SYMPTOMS: SHORTNESS OF BREATH: 1

## 2021-07-27 ASSESSMENT — PAIN DESCRIPTION - FREQUENCY: FREQUENCY: CONTINUOUS

## 2021-07-27 NOTE — OP NOTE
Preoperative diagnosis:  1. Chronic kidney disease (stage IV)  2.  Left brachial artery to cephalic vein arteriovenous fistula which is too deep to cannulate    Post procedure diagnosis: Same    Procedure:  1. Superficialization of the left upper arm cephalic vein arteriovenous fistula    Surgeon: Divya Ortega MD    Anesthesia: General    Estimated blood loss: Less than 50 mL    Findings:  1. The cephalic vein is between 6 and 7 mm in diameter. Initially, its between 2 and 3 cm deep. After superficialization, the vein lies around 3 to 4 mm deep. Procedure in detail:    After the patient was consented and given intravenous antibiotics, she is brought to the operating room placed on the table supine position. General anesthesia was achieved and the patient's left arm was prepped and draped in usual sterile procedure. The course of the cephalic vein in the upper arm was marked utilizing ultrasound. Above findings are noted preoperatively. A total of 3 transverse skip incisions were created approximately 5 cm apart over the cephalic vein. I started distally and then worked proximally. Subcutaneous tissues were dissected until the cephalic vein was encountered and then it was circumferentially mobilized and branches were ligated with 3-0 and 4-0 Vicryl sutures and clips. The entire vein was mobilized through the skip incisions with this technique. Following this, the fascia and subcutaneous tissues were closed with 3-0 PDS interrupted sutures deep to the vein so the vein was brought to a more superficial location. Next, the superficial subcutaneous layer over the course of the cephalic vein was excised with Bovie electrocautery and sharp dissection. This brought the vein even closer to the surface of the skin. Ultrasound was used to again interrogate the vein and above findings are noted.   The wounds were closed with 3-0 PDS interrupted sutures, 4-0 Monocryl subcuticular sutures, and Dermabond skin adhesive. The patient tolerated the procedure well. She was awakened from general anesthesia brought to the recovery room in good condition. Her fistula should be ready for use in 4 weeks if needed.

## 2021-07-27 NOTE — ANESTHESIA POSTPROCEDURE EVALUATION
Department of Anesthesiology  Postprocedure Note    Patient: Sandra Bond  MRN: [id number]  YOB: 1960  Date of evaluation: 7/27/2021  Time:  9:41 AM     Procedure Summary     Date: 07/27/21 Room / Location: [address]    Anesthesia Start: 8277 Anesthesia Stop: 3678    Procedure: LEFT UPPER EXTREMITY CEPHALIC VEIN SUPERFICIALIZATION (Left ) Diagnosis: (N18.6)    Surgeons: Barrera Adams MD Responsible Provider: JAMES Carcamo CRNA    Anesthesia Type: general ASA Status: 3          Anesthesia Type: general    No Phase I: No Score: 10    No Phase II:      Last vitals: Reviewed and per EMR flowsheets.        Anesthesia Post Evaluation    Patient location during evaluation: PACU  Patient participation: complete - patient participated  Level of consciousness: responsive to verbal stimuli  Pain score: 7  Airway patency: patent  Nausea & Vomiting: no vomiting and no nausea  Complications: no  Cardiovascular status: hemodynamically stable  Respiratory status: acceptable and nasal cannula  Hydration status: stable  Comments: T 98.8

## 2021-07-27 NOTE — ANESTHESIA PRE PROCEDURE
Provider, MD   Insulin Pen Needle (B-D ULTRAFINE III SHORT PEN) 31G X 8 MM MISC USE AS DIRECTED TO INJECT FOUR TIMES DAILY 10/21/20   Historical Provider, MD   Insulin Syringe-Needle U-100 (BD INSULIN SYRINGE U/F) 31G X 5/16\" 1 ML MISC AS DIRECTED FOUR TIMES A DAY 10/27/20   Historical Provider, MD   FreeStyle Lancets MISC Use as instructed 4 x daily 10/21/20   Historical Provider, MD   Omega-3 Fatty Acids (FISH OIL) 1000 MG CAPS Take 1,000 mg by mouth Daily with supper     Historical Provider, MD   epoetin jose ramon (EPOGEN;PROCRIT) 66081 UNIT/ML injection Inject 10,000 Units into the skin every 30 days     Historical Provider, MD   lamoTRIgine (LAMICTAL) 100 MG tablet Take 100 mg by mouth daily Indications: 1/2 tablet with dinner    Historical Provider, MD   allopurinol (ZYLOPRIM) 100 MG tablet Take 100 mg by mouth 2 times daily    Historical Provider, MD   Ergocalciferol (VITAMIN D2 PO) Take 50,000 Units by mouth three times a week     Historical Provider, MD   calcitRIOL (ROCALTROL) 0.25 MCG capsule Take 0.25 mcg by mouth every other day    Historical Provider, MD   citalopram (CELEXA) 40 MG tablet Take 40 mg by mouth daily    Historical Provider, MD   busPIRone (BUSPAR) 10 MG tablet Take 20 mg by mouth 2 times daily    Historical Provider, MD   aspirin 81 MG tablet Take 81 mg by mouth daily With Dinner    Historical Provider, MD   Insulin Glargine (LANTUS SC) Inject 90 Units into the skin daily With Evening Meals    Historical Provider, MD       Current medications:    No current facility-administered medications for this visit. No current outpatient medications on file.      Facility-Administered Medications Ordered in Other Visits   Medication Dose Route Frequency Provider Last Rate Last Admin    vancomycin (VANCOCIN) 2,000 mg in dextrose 5 % 500 mL IVPB ([id number])  2,000 mg Intravenous On Call to Jayson Ruff MD        0.9 % sodium chloride infusion  25 mL Intravenous PRN Ellene Schwab Legereit, PA-C UTERUS      HYSTERECTOMY  10/17/2018    POLYPECTOMY      RECTAL SURGERY      fistula repair    TONSILLECTOMY AND ADENOIDECTOMY      VASCULAR SURGERY  07/12/2021    [facility]- Ultrasound-guided cannulation left distal upper arm cephalic vein with 4 Lao glide sheath, Left upper extremity fistulogram's including venography the superior vena cava       Social History:    Social History     Tobacco Use    Smoking status: Never Smoker    Smokeless tobacco: Never Used   Substance Use Topics    Alcohol use: No                                Counseling given: Not Answered      Vital Signs (Current): There were no vitals filed for this visit.                                            BP Readings from Last 3 Encounters:   07/12/21 (!) 154/68   07/01/21 (!) 128/58   06/18/21 121/72       NPO Status:                                                                                 BMI:   Wt Readings from Last 3 Encounters:   07/12/21 (!) 393 lb (178.3 kg)   07/01/21 (!) 390 lb (176.9 kg)   06/16/21 (!) 383 lb (173.7 kg)     There is no height or weight on file to calculate BMI.    CBC:   Lab Results   Component Value Date    WBC 10.9 06/18/2021    RBC 3.78 06/18/2021    HGB 11.4 06/18/2021    HCT 35.3 06/18/2021    HCT 30.9 05/23/2012    MCV 93.4 06/18/2021    RDW 15.5 06/18/2021     06/18/2021     05/23/2012       CMP:   Lab Results   Component Value Date     06/18/2021     05/23/2012    K 4.5 06/18/2021    K 4.4 05/23/2012     06/18/2021     05/23/2012    CO2 20 06/18/2021    BUN 45 06/18/2021    CREATININE 3.3 06/18/2021    CREATININE 1.9 05/23/2012    GFRAA 17 06/18/2021    LABGLOM 14 06/18/2021    GLUCOSE 185 06/18/2021    PROT 7.4 06/18/2021    PROT 6.8 01/09/2013    CALCIUM 8.4 06/18/2021    BILITOT <0.2 06/18/2021    ALKPHOS 104 06/18/2021    ALKPHOS 74 10/07/2011    AST 12 06/18/2021    ALT 8 06/18/2021       POC Tests: No results for input(s): POCGLU, POCNA, POCK, POCCL, Dorothea Yost, POCHCT in the last 72 hours. Coags:   Lab Results   Component Value Date    PROTIME 12.8 06/18/2021    PROTIME 16.08 02/29/2012    INR 0.97 06/18/2021    APTT 27.4 06/18/2021       HCG (If Applicable): No results found for: PREGTESTUR, PREGSERUM, HCG, HCGQUANT     ABGs: No results found for: PHART, PO2ART, RAE0ARQ, XAC9EAG, BEART, D4ZNFSRR     Type & Screen (If Applicable):  No results found for: LABABO, LABRH    Drug/Infectious Status (If Applicable):  No results found for: HIV, HEPCAB    COVID-19 Screening (If Applicable): No results found for: COVID19        Anesthesia Evaluation  Patient summary reviewed and Nursing notes reviewed   history of anesthetic complications: PONV. Airway: Mallampati: IV  TM distance: >3 FB   Neck ROM: full  Mouth opening: > = 3 FB Dental: normal exam         Pulmonary:   (+) shortness of breath: chronic,  sleep apnea: on CPAP,      (-) asthma and not a current smoker                          ROS comment: CXR:  No active cardiopulmonary disease. Chronic inflammatory and granulomatous lung changes. Cardiovascular:  Exercise tolerance: no interval change,   (+) hypertension:, hyperlipidemia    (-) pacemaker, past MI, CAD, CABG/stent, dysrhythmias and  angina    ECG reviewed               Beta Blocker:  Dose within 24 Hrs      ROS comment: EKG;  57 BPM  Sinus rhythm  Prolonged QT interval  Poor R wave progression - probable normal variant  Low QRS voltages in precordial leads  Comparison Summary: Descriptive differences only  Summary: Borderline ECG     Neuro/Psych:   (+) psychiatric history:   (-) seizures and CVA           GI/Hepatic/Renal:   (+) GERD: well controlled, renal disease: CRI, morbid obesity          Endo/Other:    (+) DiabetesType II DM, well controlled, using insulin, blood dyscrasia: anemia:., no malignancy/cancer. (-) no malignancy/cancer               Abdominal:             Vascular:   + DVT, .        Other Findings:               Anesthesia Plan      general     ASA 3       Induction: intravenous. MIPS: Postoperative opioids intended and Prophylactic antiemetics administered. Anesthetic plan and risks discussed with patient.                       Radha Bernard,    7/27/2021

## 2021-07-27 NOTE — PROGRESS NOTES
[facility]  HEMATOLOGY & ONCOLOGY    W ONC [facility] HEMATOLOGY AND ONCOLOGY  [address]  [facility] [address] [phone]    Patient Name: Maria C Shrestha  Encounter Date: 07/28/2021  YOB: 1960  Patient Number: [id number]    Chief Complaint   Patient presents with   • Anemia     here for f/u       REASON FOR VISIT: Maria C Shrestha is a 60-year-old female here for follow-up of anemia due to chronic kidney disease stage IV.  She sees nephrologist Dr. Davis.  She has been receiving Procrit when she meets guidelines.  Her last Procrit injection was on 12/21/2020.  She has also had iron infusions in the past with the last infusions of Injectafer being on 7/17/2020 and 7/31/2020.  She is noted to have leukocytosis on labs beginning 6/18/2021.    At last appointment patient had stated that Dr. Davis told her in December that he was going to get her ready for dialysis.  Today she states she has an appointment with Dr. Davis on April 7, and at that time they will discuss possible dialysis.    Patient states she is still having the same symptoms as she was experiencing at her last office visit which is extreme fatigue, generalized weakness, sinus congestion and allergies.  No sinus pressure upon physical examination.  Patient states she is taking Zyrtec and using Flonase twice daily.  She states her throat feels swollen in the back especially in the morning after using CPAP all night.  She states that the reservoir is dry by morning.  Patient has not seen her primary care physician Dr. Soliman regarding this issue.  Encouraged patient to make appointment to be seen by Dr. Soliamn.  At last appointment diabetes management was discussed with patient.  She states she has been trying to make small changes in her diet since her last visit.  Still dealing with depression and problems managing her diabetes.  Patient has lost 6  "pounds since last office visit which she states that she believes is fluid loss.  She does have lower extremity edema that is nonpitting.  She is still using a wheelchair to move around as she is not able to walk more than a few feet.  She denies abdominal pain, fever, chills, night sweats, diarrhea, constipation, nausea/vomiting.    Labs today include hemoglobin 10.3, hematocrit 32.3, GFR 19, iron saturation 22, ferritin 466.10, and platelets 212,000.  Patient meets guidelines to receive Procrit today.    On follow up 3/17/2021: Patient last received Procrit on 2/17/2021. Patient is seeing ENT next month for throat issues. States she has gained 10 pounds since last visit, and is holding a lot fluid.  Lung sounds are clear, normal heart sounds heard, and she does have 1+ pitting edema in her lower extremities bilaterally.  She is scheduled to see nephrologist Dr. Davis next week.  She states she still has fatigue specially upon walking short distances, and still has shortness of breath with exertion.  Denies chills, fever, nausea/vomiting.    Today's labs shared with patient in office.  Hemoglobin 9.8, hematocrit 29.8, platelets 186,000, blood glucose 207, iron saturation 16, ferritin 322.40.  As patient's iron saturation is less than 20 patient is not eligible for Procrit today.  Patient is to receive 1 dose of Injectafer 750 mg today.  Patient has had Injectafer in the past as she was unable to tolerate oral iron pills.  Will reassess labs in 1 month with possible Procrit injection.      On follow up 4/14/2021: Patient presents feeling \"okay, but tired\". She complains of bilateral edema in lower extremities and fatigue. She denies fever, chills, nausea, vomiting, weight loss.   She states she has been fluctuating 10 pounds due to fluid build up. Left leg swells larger than right.  Edema decreases after elevating legs.  Recently met with nephrologist Dr. Davis, and he states they are still working towards " "dialysis. Discussed with patient to decrease salt intake including canned foods.  Patient encouraged to continue drinking water and continue decreasing soda intake.  Continue elevating legs.  Encouraged patient to discuss with her pharmacy her options of possibly special ordering compression stockings to help with her lower extremity edema.  Pitting edema present bilaterally in lower extremities.    Her labs today do meet the guidelines to receive Procrit.  Labs shared with patient in office.  Her hemoglobin has increased to 10, hematocrit 30.2, GFR decreased to 15, iron saturation 21, ferritin high at 538.70 (patient had Injectafer infusion 1 month ago).           2/17/21   Procrit 40,000 units   04/14/2021 Procrit 40,000 units   07/28/2021 Procrit 40,000 units             3/17/21 Injectafer 750mg                 Follow-up on 5/12/2021:    Patient presents today feeling fatigued. Patient complains \"regular aches and pains\" and shortness of breath on exertion. Patient denies  fever, chills, unexpected weight loss, nausea, vomiting, chest pain. Occasion leg swelling.    She has appointment on 5/19/2021 with Vascular Surgeon Dr. Byers at [facility] to map out veins for port placement for future dialysis.    Today's labs discussed with patient in office. Hemoglobin 10.8, hematocrit 33.3, GFR 14.  Patient does not meet guidelines for Procrit today.    Discussed with patient her blood pressure is elevated today. To start checking blood pressure at home, and follow up with PCP.    Patient is to follow up in one month with Dr. Stanley.    Follow-up on 7/28/2021:    Patient was seen by Dr. Stanley for 3 office visits.    Dr. Stanley's notes:  1. She clearly has anemia of chronic DS  2. Her response from EPOGEN has been very minimal  3. She will try low dose of dexamethasone  4. Her CBC is worrisome for immature granulocyte and other findings worrisome for MDS/MPD, she will have her BM BX done  1. She will be monitored " for possible blood loss  2. She is on IV FE program  Bone marrow suppression      Current Assessment & Plan       1. We have reviewed BM BX report  2. There is no evidence for MDS MPD Leukemia or Lymphoma           Anemia, chronic disease - Primary     Current Assessment & Plan       1. Her HGB has been improved to 11 range  2. Her ESR is above 40  3. We will start dexamethasone for anemia of chronic DS        Bone marrow biopsy performed 6/23/2021                 She presents today with her  and is feeling okay other than she had surgery yesterday to raise her arteries in her left arm near her recently placed on 6/18/21 fistula for dialysis. Patient denies fever, chills, unexpected weight loss, nausea, vomiting, chest pain. shortness of breath on exertion. Her leg swelling has increased lately, and her oxygen levels have been dropping, and her physician wants her to wear her CPAP while resting if not currently doing anything.    Today's labs shared with patient in office.  Hemoglobin 9.8, hematocrit 30.4, platelets 179,000, WBC 11.71, GFR 15, iron saturation 22, ferritin 424.00. She has been able to decrease her insulin.   She is eligible to receive Procrit today.   Normal bone marrow biopsy results and previous lab results discussed with patient.  All questions answered.    She is to stop Dexamethasone as their is not a big change in her labs.     She had a chest x-ray on 7/12/2021 which showed chronic inflammatory and granulomatous lung changes.  This is another source of inflammation in the body.    Folate level noted to be low at 3.29 on 7/14/2021.  Folic acid prescription sent to pharmacy for patient to take daily.  Patient verbalized understanding.    PAST MEDICAL HISTORY:  ALLERGIES:  Allergies   Allergen Reactions   • Codeine Nausea Only and Nausea And Vomiting     N/v/felt hot       CURRENT MEDICATIONS:  Outpatient Encounter Medications as of 7/28/2021   Medication Sig Dispense Refill   •  "allopurinol (ZYLOPRIM) 100 MG tablet Take 100 mg by mouth 2 (Two) Times a Day.     • amLODIPine (NORVASC) 10 MG tablet Take 10 mg by mouth.     • aspirin 81 MG tablet Take 81 mg by mouth Daily. Stop 7/22/2018     • azelastine (ASTELIN) 0.1 % nasal spray 2 sprays into the nostril(s) as directed by provider 2 (Two) Times a Day. Use in each nostril as directed 30 mL 11   • BD Insulin Syringe U/F 31G X 5/16\" 1 ML misc AS DIRECTED FOUR TIMES A  each 1   • busPIRone (BUSPAR) 10 MG tablet TAKE 2 TABLETS TWICE A DAY AS NEEDED 360 tablet 3   • carvedilol (COREG) 12.5 MG tablet Take 12.5 mg by mouth 2 (Two) Times a Day With Meals.     • cetirizine (zyrTEC) 10 MG tablet Take 10 mg by mouth Daily.     • Cholecalciferol (VITAMIN D3) 04947 units tablet Take 1 capsule by mouth 2 (Two) Times a Week.     • citalopram (CeleXA) 40 MG tablet TAKE 1 TABLET DAILY 90 tablet 3   • dexamethasone (DECADRON) 1 MG/ML solution Take  by mouth Daily.     • diazePAM (VALIUM) 2 MG tablet Take 2 mg by mouth As Needed for Anxiety.     • Dulaglutide (Trulicity) 3 MG/0.5ML solution pen-injector Inject 3 mg under the skin into the appropriate area as directed 1 (One) Time Per Week. 12 pen 3   • epoetin elsa (EPOGEN,PROCRIT) 97914 UNIT/ML injection Inject 10,000 Units under the skin into the appropriate area as directed Every 30 (Thirty) Days.     • Evolocumab (REPATHA) solution auto-injector SureClick injection Inject 140 mg under the skin into the appropriate area as directed Every 14 (Fourteen) Days.     • Evolocumab 140 MG/ML solution auto-injector Inject 1 mL under the skin into the appropriate area as directed Every 14 (Fourteen) Days for 24 doses. NDC [address]-50 6 mL 10   • fluticasone (FLONASE) 50 MCG/ACT nasal spray 1 spray into the nostril(s) as directed by provider 2 (Two) Times a Day.     • folic acid (FOLVITE) 1 MG tablet Take 1 tablet by mouth Daily. 30 tablet 2   • Glucose Blood (BLOOD GLUCOSE TEST) strip Use 4 x daily, use any " brand covered by insurance or same brand as before 360 each 3   • insulin glargine (Lantus) 100 UNIT/ML injection INJECT 200 UNITS DAILY (Patient taking differently: Inject 90 Units under the skin into the appropriate area as directed Daily. INJECT 200 UNITS DAILY) 60 mL 11   • Insulin Lispro (HumaLOG KwikPen) 200 UNIT/ML solution pen-injector Inject 80 Units under the skin into the appropriate area as directed 3 (Three) Times a Day With Meals. 24 pen 11   • Insulin Pen Needle (B-D ULTRAFINE III SHORT PEN) 31G X 8 MM misc USE AS DIRECTED TO INJECT FOUR TIMES DAILY 200 each 2   • lamoTRIgine (LaMICtal) 100 MG tablet TAKE ONE-HALF (1/2) TABLET DAILY 45 tablet 3   • Lancets (freestyle) lancets Use as instructed 4 x daily 150 each 11   • levothyroxine (SYNTHROID, LEVOTHROID) 88 MCG tablet Take 1 tablet by mouth Daily. 90 tablet 3   • lisinopril (PRINIVIL,ZESTRIL) 40 MG tablet Take 40 mg by mouth 2 (Two) Times a Day.     • magnesium gluconate (MAGONATE) 500 MG tablet Take 27 mg by mouth 2 (Two) Times a Day.     • magnesium oxide (MAGOX) 400 (241.3 Mg) MG tablet tablet Take 550 mg by mouth Daily.     • Multiple Vitamins-Minerals (MULTIVITAMIN ADULT PO) Take 1 tablet by mouth Daily.     • mupirocin (BACTROBAN) 2 % ointment Apply  topically to the appropriate area as directed 3 (Three) Times a Day. (Patient taking differently: Apply 11 application topically to the appropriate area as directed As Needed.) 22 g 0   • Omega-3 Fatty Acids (FISH OIL) 1000 MG capsule capsule Take 2,000 mg by mouth Daily With Dinner.     • rosuvastatin (CRESTOR) 20 MG tablet      • sodium bicarbonate 650 MG tablet Take 1,300 mg by mouth 3 (Three) Times a Day.     • vitamin D (ERGOCALCIFEROL) 1.25 MG (66189 UT) capsule capsule Take 50,000 Units by mouth 1 (One) Time Per Week. 3x weekly     • [DISCONTINUED] potassium gluconate 595 (99 K) MG tablet tablet potassium gluconate 595 mg (99 mg) tablet   Take 1 tablets every day by oral route.       No  facility-administered encounter medications on file as of 7/28/2021.       ADULT ILLNESSES:  Patient Active Problem List   Diagnosis Code   • Type 2 diabetes mellitus with stage 3 chronic kidney disease, with long-term current use of insulin (CMS/HCC) E11.22, N18.30, Z79.4   • Mixed diabetic hyperlipidemia associated with type 2 diabetes mellitus (CMS/HCC) E11.69, E78.2   • Hypertension associated with diabetes (CMS/HCC) E11.59, I15.2   • Vitamin D deficiency E55.9   • B12 deficiency E53.8   • Anemia in CKD (chronic kidney disease) N18.9, D63.1   • Acquired hypothyroidism E03.9   • Chronic kidney disease, stage 4 (severe) (CMS/HCC) N18.4   • Anemia D64.9   • Anxiety F41.9   • Deep venous thrombosis of lower extremity (CMS/HCC) I82.409   • Disease of liver K76.9   • Embolism (CMS/HCC) I74.9   • Hypertriglyceridemia E78.1   • Hypocalcemia E83.51   • Mood disorder (CMS/HCC) F39   • Obesity E66.9   • Sleep apnea G47.30   • Acute renal failure superimposed on stage 3 chronic kidney disease (CMS/[facility]) N17.9, N18.30   • Anemia of chronic renal failure N18.9, D63.1   • Benign hypertension with chronic kidney disease, stage IV (CMS/HCC) I12.9, N18.4   • Diabetes mellitus (CMS/HCC) E11.9   • Thyroid disease E07.9   • Hypothyroidism E03.9   • Vitamin D deficiency E55.9   • Anemia due to stage 4 chronic kidney disease (CMS/[facility]) N18.4, D63.1   • Stage 4 chronic kidney disease (CMS/[facility]) N18.4   • Allergic rhinitis with postnasal drip J30.9, R09.82   • Bone marrow suppression D75.89   • Abnormal coagulation profile  R79.1   • Anemia, chronic disease D63.8   • Class 3 severe obesity due to excess calories without serious comorbidity in adult (CMS/[facility]) E66.01       SURGERIES:  Past Surgical History:   Procedure Laterality Date   • ADENOIDECTOMY     • ANAL FISTULA REPAIR      x 2    • CHOLECYSTECTOMY     • COLONOSCOPY  01/02/2014   • COLONOSCOPY N/A 3/22/2017    Procedure: COLONOSCOPY WITH ANESTHESIA;  Surgeon: Mono Linder MD;   Location: [facility] ENDOSCOPY;  Service:    • D & C HYSTEROSCOPY N/A 7/25/2018    Procedure: DILATATION AND CURETTAGE HYSTEROSCOPY;  Surgeon: Michael Castaneda MD;  Location: [facility] OR;  Service: Obstetrics/Gynecology   • DILATATION AND CURETTAGE      X 2   • ENDOSCOPY     • HYSTERECTOMY     • TONSILLECTOMY         HEALTH MAINTENANCE ITEMS:  <no information>  Last Completed Colonoscopy     3/22/2017-colonoscopy  Findings: The perianal and digital rectal examinations were normal.  A 5 mm polyp was found in the proximal transverse colon. The polyp was  sessile. The polyp was removed with a hot snare. Resection and  retrieval were complete.  : Single Polyp  A 6 mm polyp was found in the distal sigmoid colon. The polyp was semisessile.  The polyp was removed with a hot snare. Resection and  retrieval were complete.  :A localized area of inflamed mucosa was found at the anus. Biopsies  were taken with a cold forceps for histology.  : Abnormal mucosa  The exam was otherwise without abnormality on direct and retroflexion views.  Impression:  - One 5 mm polyp in the proximal transverse colon, removed with a hot snare. Resected and retrieved.  - One 6 mm polyp in the distal sigmoid colon, removed with a hot snare. Resected and retrieved.  - Inflamed mucosa at the anus. this appeared to be an inflamed anal papilla, similar to the same finding  in 2014 (actually improved compared to pictures 2014) Biopsied.  - The examination was otherwise normal on direct and retroflexion views.              Last Completed Mammogram          MAMMOGRAM (Every 2 Years) Next due on 8/12/2022 08/12/2020  Mammo Screening Digital Tomosynthesis Bilateral With CAD    05/16/2018  Mammo Screening Digital Tomosynthesis Bilateral With CAD    01/20/2015  MAMMOGRAPHY SCREENING BILATERAL                FAMILY HISTORY:  Family History   Problem Relation Age of Onset   • Diabetes Other    • Heart failure Other    • Cancer Other    • Kidney disease Other     • Cancer Mother    • Cancer Father    • Heart disease Father    • Diabetes Father    • Obesity Father    • Stroke Father    • Cancer Maternal Grandmother    • Uterine cancer Maternal Grandmother    • Diabetes Maternal Grandfather    • Cancer Paternal Grandmother    • Colon cancer Paternal Grandmother    • Diabetes Paternal Grandfather    • Heart disease Paternal Grandfather    • No Known Problems Sister    • No Known Problems Brother    • No Known Problems Daughter    • No Known Problems Maternal Aunt    • No Known Problems Paternal Aunt    • BRCA 1/2 Neg Hx    • Breast cancer Neg Hx    • Endometrial cancer Neg Hx    • Ovarian cancer Neg Hx        SOCIAL HISTORY:  Social History     Socioeconomic History   • Marital status:      Spouse name: Not on file   • Number of children: Not on file   • Years of education: Not on file   • Highest education level: Not on file   Tobacco Use   • Smoking status: Never Smoker   • Smokeless tobacco: Never Used   Vaping Use   • Vaping Use: Never used   Substance and Sexual Activity   • Alcohol use: No   • Drug use: No   • Sexual activity: Never     Birth control/protection: Post-menopausal     I have reviewed the ROS documented here and confirmed the accuracy of it with the patient today. 07/28/2021  FLOR Soares      REVIEW OF SYSTEMS:  Review of Systems   Constitutional: Positive for fatigue (Patient in wheelchair, and only able to walk short distances). Negative for activity change, appetite change, chills, diaphoresis, fever and unexpected weight loss.   HENT: Positive for congestion, ear pain, postnasal drip and sneezing. Negative for nosebleeds, sinus pressure, sore throat (mainly in am after sleeping with cpap all night. States resevoir is dry by morning.) and voice change.         5/12/21 allergies   Eyes: Negative for blurred vision, double vision, pain and visual disturbance.   Respiratory: Positive for shortness of breath (Upon exersion, unable to walk very  "much). Negative for cough.    Cardiovascular: Positive for leg swelling (5/12/21 occasionally). Negative for chest pain and palpitations.   Gastrointestinal: Negative for abdominal pain, anal bleeding, blood in stool, constipation, diarrhea, nausea and vomiting.   Endocrine: Negative for heat intolerance, polydipsia and polyuria.   Genitourinary: Positive for frequency, urgency (States she has 3-4 accidents per week) and urinary incontinence (3-4 times weekly). Negative for dysuria and hematuria.   Musculoskeletal: Positive for gait problem (uses wheelchair). Negative for arthralgias and myalgias.   Skin: Negative for rash and skin lesions.   Neurological: Positive for weakness. Negative for dizziness, tremors, seizures, syncope, speech difficulty and headache.   Hematological: Negative for adenopathy. Does not bruise/bleed easily.   Psychiatric/Behavioral: Positive for depressed mood and stress (family issues). Negative for dysphoric mood, sleep disturbance and suicidal ideas. The patient is nervous/anxious.        /78   Pulse 68   Temp 97.8 °F (36.6 °C) (Temporal)   Resp 16   Ht 165.1 cm (65\")   Wt (!) 178 kg (392 lb)   LMP  (LMP Unknown)   SpO2 98%   Breastfeeding No   BMI 65.23 kg/m²  Body surface area is 2.63 meters squared.  Pain Score    07/28/21 0801   PainSc:   8   PainLoc: Arm         Same physical examination performed again with no changes from last office visit. 07/28/2021  FLOR Soares      Physical Exam:  Physical Exam  Vitals reviewed.   Constitutional:       General: She is awake.      Appearance: Normal appearance. She is well-developed and well-groomed. She is morbidly obese.   HENT:      Head: Normocephalic and atraumatic.      Nose: Nose normal.   Eyes:      General: No scleral icterus.     Pupils: Pupils are equal, round, and reactive to light.   Neck:      Trachea: Trachea normal.   Cardiovascular:      Rate and Rhythm: Normal rate and regular rhythm.      Pulses: Normal " pulses.      Heart sounds: Normal heart sounds. No murmur heard.   No friction rub. No gallop.    Pulmonary:      Effort: Pulmonary effort is normal.      Breath sounds: Normal breath sounds. No wheezing, rhonchi or rales.   Abdominal:      Palpations: Abdomen is soft.      Tenderness: There is no abdominal tenderness. There is no guarding.   Musculoskeletal:      Cervical back: Normal range of motion and neck supple.      Right lower le+ Pitting Edema present.      Left lower le+ Pitting Edema present.   Lymphadenopathy:      Cervical: No cervical adenopathy.      Upper Body:      Right upper body: No supraclavicular adenopathy.      Left upper body: No supraclavicular adenopathy.   Skin:     General: Skin is warm and dry.   Neurological:      General: No focal deficit present.      Mental Status: She is alert and oriented to person, place, and time.      Sensory: No sensory deficit.   Psychiatric:         Attention and Perception: Attention normal.         Mood and Affect: Mood and affect normal.         Speech: Speech normal.         Behavior: Behavior normal. Behavior is cooperative.         Thought Content: Thought content normal.         Cognition and Memory: Cognition normal.         Judgment: Judgment normal.         Maria C Shrestha reports a pain score of 0.  Given her pain assessment as noted, treatment options were discussed and the following options were decided upon as a follow-up plan to address the patient's pain: referral to Primary Care for assistance in pain treatment guidance.      Patient's Body mass index is 65.23 kg/m². BMI is above normal parameters. Recommendations include: referral to primary care.      LABS    Lab Results - Last 18 Months   Lab Units 21  0748 21  0619 21  0942 21  1524 21  1102 21  0805 21  0955 21  1244 21  1340 21  1340   HEMOGLOBIN g/dL 9.8* 10.1* 9.3* 9.5* 10.4* 10.3*   < > 10.8*   < > 10.0*   HEMATOCRIT  % 30.4* 31.5* 28.4* 29.8* 32.9* 32.1*   < > 33.3*   < > 30.2*   MCV fL 95.0 98.7 93.4 96.8 95.1 92.8   < > 92.0   < > 91.0   WBC 10*3/mm3 11.71* 10.0 6.90 9.28 10.91* 11.02*   < > 8.29   < > 9.35   RDW % 14.8 14.9* 14.8 15.6* 15.6* 15.6*   < > 15.2   < > 14.8   MPV fL 10.4 10.6 9.4 10.6 10.3 10.2   < > 9.9   < > 10.0   PLATELETS 10*3/mm3 179 189 147 152 190 211   < > 219   < > 209   IMM GRAN % % 0.7*  --   --  0.4 0.5 0.8*  --  0.6*  --  0.6*   NEUTROS ABS 10*3/mm3 10.24*  --  4.90 7.75* 8.00* 7.59*  --  6.49   < > 6.67   LYMPHS ABS 10*3/mm3 0.74  --  1.50 0.98 2.10 2.18  --  1.03   < > 1.82   MONOS ABS 10*3/mm3 0.63  --   --  0.35 0.58 0.72  --  0.44  --  0.56   EOS ABS 10*3/mm3 0.00  --   --  0.12 0.14 0.39  --  0.24  --  0.20   BASOS ABS 10*3/mm3 0.02  --   --  0.04 0.04 0.05  --  0.04  --  0.04   IMMATURE GRANS (ABS) 10*3/mm3 0.08*  --   --  0.04 0.05 0.09*  --  0.05  --  0.06*   NRBC /100 WBC 0.0  --   --  0.0 0.0 0.0  --  0.0  --  0.0    < > = values in this interval not displayed.       Lab Results - Last 18 Months   Lab Units 07/21/21  0942 07/14/21  1524 06/30/21  1102 06/18/21  0621 06/16/21  0955 05/12/21  1244 04/14/21  1340 04/14/21  1340 03/17/21  1301 03/17/21  1301   GLUCOSE mg/dL 136* 174* 130* 185* 81 193*   < > 126*   < > 207*   SODIUM mmol/L 138 140 144 137 140 140   < > 143   < > 141   POTASSIUM mmol/L 4.3 5.4* 4.1 4.5 4.0 4.2   < > 3.4*   < > 4.0   TOTAL CO2 mmol/L  --   --   --  20* 21*  --   --   --   --   --    CO2 mmol/L 22.0* 22.0 22.0  --   --  19.0*   < > 24.0   < > 22.0   CHLORIDE mmol/L 112* 109* 112* 103 108 110*   < > 106   < > 107   ANION GAP mmol/L 4.0 9.0 10.0 14 11 11.0   < > 13.0   < > 12.0   CREATININE mg/dL 3.45* 3.30* 2.92* 3.3* 3.2* 3.28*   < > 3.21*   < > 2.75*   BUN mg/dL 40* 43* 45* 45* 34* 42*   < > 28*   < > 34*   BUN / CREAT RATIO  11.6 13.0 15.4  --   --  12.8  --  8.7  --  12.4   CALCIUM mg/dL 8.1*  8.4 8.4* 8.4* 8.4* 8.6* 8.8   < > 8.0*   < > 8.5*   EGFR IF  NONAFRICN AM mL/min/1.73 14* 14* 16* 14* 15* 14*   < > 15*   < > 18*   ALK PHOS U/L 72 73 80 104  --  88  --  85   < > 89   TOTAL PROTEIN g/dL 5.5* 5.8* 5.8* 7.4  --  6.2  --  6.3   < > 6.4   ALT (SGPT) U/L 14 13 21 8  --  7  --  7   < > 9   AST (SGOT) U/L 16 13 19 12  --  10  --  11   < > 14   BILIRUBIN mg/dL 0.1 <0.2 0.2 <0.2  --  0.2  --  0.2   < > <0.2   ALBUMIN g/dL 2.90* 3.20* 2.80* 3.5  --  3.10*  --  3.10*   < > 3.10*   GLOBULIN gm/dL 2.6 2.6 3.0  --   --  3.1  --  3.2  --  3.3    < > = values in this interval not displayed.       Lab Results - Last 18 Months   Lab Units 07/14/21  1524 05/12/21  1244   REFERENCE LAB REPORT  See Attached Report See Attached Report       Lab Results - Last 18 Months   Lab Units 07/21/21  0942 07/14/21  1524 06/30/21  1102 05/12/21  1244 04/14/21  1340 03/17/21  1301 03/17/21  0904 02/17/21  1253 10/14/20  1023 10/14/20  1023 07/15/20  1255 07/15/20  1006 04/22/20  0816 04/13/20  1038   IRON mcg/dL  --  83  --  54 55 42  --  66  --  90   < >  --    < >  --    TIBC mcg/dL  --  268*  --  244* 268* 261*  --  297*  --  273*   < >  --    < >  --    IRON SATURATION %  --  31  --  22 21 16*  --  22  --  33   < >  --    < >  --    FERRITIN ng/mL  --  440.50* 423.10* 462.30* 538.70* 322.40*  --  466.10*   < > 405.70*   < >  --    < >  --    TSH uIU/mL 2.410  --   --   --   --   --  4.940*  --   --  2.650  --  3.010  --  7.250*   FOLATE ng/mL  --  3.29*  --   --   --   --   --   --   --   --   --   --   --   --     < > = values in this interval not displayed.     Assessment    1.  Type 2 diabetes-patient currently taking Trulicity, evolocumab, Humalog, and Lantus. Blood sugar on today's labs 263.  Managed by endocrinologist Dr. Patel.    2.  Hypertension-patient currently taking amlodipine, carvedilol, and lisinopril.  Blood pressure in office today 138/78. Managed by primary care physician.    3.  Anxiety-patient currently taking citalopram, diazepam.  Managed by primary care  physician.    4.  Hypothyroidism-patient currently taking levothyroxine.  Managed by endocrinologist Dr. Patel.              .  5.  Hyperlipidemia-patient currently taking rosuvastatin. Managed by primary care physician.     6.  Chronic kidney disease stage IV-GFR on today's labs 15. Managed by nephrologist Dr. Davis    7.  Anemia due to chronic kidney disease stage IV-today's labs shared with patient in office.  See labs above.  Patient is eligible for Procrit today.    Plan    1.  Continue to monitor labs monthly.    2.  Iron replacement as needed.      3.  Procrit to be given if meets guidelines.  Patient to receive Procrit today.    4.  Patient to continue following up with primary care physician and specialists.    5.  She is to stop Dexamethasone.    6.  She is to start taking Folic acid daily. Prescription sent to pharmacy.    7.  The patient is asked to return in 1 month with preoffice labs CBC, CMP, iron profile, ferritin.    I spent 28 minutes caring for Maria C on this date of service. This time includes time spent by me in the following activities: preparing for the visit, reviewing tests, performing a medically appropriate examination and/or evaluation, counseling and educating the patient/family/caregiver and documenting information in the medical record.     FLOR Soares  07/28/2021  08:37 CDT

## 2021-07-27 NOTE — PROGRESS NOTES
EKG done at bedside. Dr. Jefe Cruz here, evaluated patient, reviewed current EKG and compared to previous one.   States discomfort is not heart related, and patient is OK to go to [facility]

## 2021-07-28 ENCOUNTER — TELEPHONE (OUTPATIENT)
Dept: BARIATRICS/WEIGHT MGMT | Facility: CLINIC | Age: 61
End: 2021-07-28

## 2021-07-28 ENCOUNTER — OFFICE VISIT (OUTPATIENT)
Dept: ONCOLOGY | Facility: CLINIC | Age: 61
End: 2021-07-28

## 2021-07-28 ENCOUNTER — INFUSION (OUTPATIENT)
Dept: ONCOLOGY | Facility: HOSPITAL | Age: 61
End: 2021-07-28

## 2021-07-28 ENCOUNTER — TELEMEDICINE (OUTPATIENT)
Dept: ENDOCRINOLOGY | Facility: CLINIC | Age: 61
End: 2021-07-28

## 2021-07-28 ENCOUNTER — LAB (OUTPATIENT)
Dept: LAB | Facility: HOSPITAL | Age: 61
End: 2021-07-28

## 2021-07-28 ENCOUNTER — APPOINTMENT (OUTPATIENT)
Dept: LAB | Facility: HOSPITAL | Age: 61
End: 2021-07-28

## 2021-07-28 VITALS
WEIGHT: 293 LBS | DIASTOLIC BLOOD PRESSURE: 78 MMHG | TEMPERATURE: 97.8 F | RESPIRATION RATE: 16 BRPM | HEIGHT: 65 IN | OXYGEN SATURATION: 98 % | HEART RATE: 68 BPM | BODY MASS INDEX: 48.82 KG/M2 | SYSTOLIC BLOOD PRESSURE: 138 MMHG

## 2021-07-28 VITALS
BODY MASS INDEX: 48.82 KG/M2 | WEIGHT: 293 LBS | RESPIRATION RATE: 20 BRPM | SYSTOLIC BLOOD PRESSURE: 138 MMHG | HEIGHT: 65 IN | DIASTOLIC BLOOD PRESSURE: 78 MMHG

## 2021-07-28 DIAGNOSIS — D50.0 IRON DEFICIENCY ANEMIA DUE TO CHRONIC BLOOD LOSS: ICD-10-CM

## 2021-07-28 DIAGNOSIS — D63.8 ANEMIA, CHRONIC DISEASE: Primary | ICD-10-CM

## 2021-07-28 DIAGNOSIS — N18.4 CHRONIC KIDNEY DISEASE, STAGE 4 (SEVERE) (HCC): ICD-10-CM

## 2021-07-28 DIAGNOSIS — E03.9 ACQUIRED HYPOTHYROIDISM: ICD-10-CM

## 2021-07-28 DIAGNOSIS — N18.6 ANEMIA IN CHRONIC KIDNEY DISEASE, ON CHRONIC DIALYSIS (HCC): ICD-10-CM

## 2021-07-28 DIAGNOSIS — Z79.4 TYPE 2 DIABETES MELLITUS WITH STAGE 4 CHRONIC KIDNEY DISEASE, WITH LONG-TERM CURRENT USE OF INSULIN (HCC): Primary | ICD-10-CM

## 2021-07-28 DIAGNOSIS — D63.1 ANEMIA IN CHRONIC KIDNEY DISEASE, ON CHRONIC DIALYSIS (HCC): ICD-10-CM

## 2021-07-28 DIAGNOSIS — N18.4 TYPE 2 DIABETES MELLITUS WITH STAGE 4 CHRONIC KIDNEY DISEASE, WITH LONG-TERM CURRENT USE OF INSULIN (HCC): Primary | ICD-10-CM

## 2021-07-28 DIAGNOSIS — E53.8 FOLIC ACID DEFICIENCY: ICD-10-CM

## 2021-07-28 DIAGNOSIS — E11.59 HYPERTENSION ASSOCIATED WITH DIABETES (HCC): ICD-10-CM

## 2021-07-28 DIAGNOSIS — E78.2 MIXED DIABETIC HYPERLIPIDEMIA ASSOCIATED WITH TYPE 2 DIABETES MELLITUS (HCC): ICD-10-CM

## 2021-07-28 DIAGNOSIS — Z99.2 ANEMIA IN CHRONIC KIDNEY DISEASE, ON CHRONIC DIALYSIS (HCC): ICD-10-CM

## 2021-07-28 DIAGNOSIS — E11.22 TYPE 2 DIABETES MELLITUS WITH STAGE 4 CHRONIC KIDNEY DISEASE, WITH LONG-TERM CURRENT USE OF INSULIN (HCC): Primary | ICD-10-CM

## 2021-07-28 DIAGNOSIS — D63.1 ANEMIA DUE TO STAGE 4 CHRONIC KIDNEY DISEASE (HCC): ICD-10-CM

## 2021-07-28 DIAGNOSIS — E11.69 MIXED DIABETIC HYPERLIPIDEMIA ASSOCIATED WITH TYPE 2 DIABETES MELLITUS (HCC): ICD-10-CM

## 2021-07-28 DIAGNOSIS — N18.4 STAGE 4 CHRONIC KIDNEY DISEASE (HCC): Primary | ICD-10-CM

## 2021-07-28 DIAGNOSIS — I15.2 HYPERTENSION ASSOCIATED WITH DIABETES (HCC): ICD-10-CM

## 2021-07-28 DIAGNOSIS — N18.4 ANEMIA DUE TO STAGE 4 CHRONIC KIDNEY DISEASE (HCC): ICD-10-CM

## 2021-07-28 LAB
ALBUMIN SERPL-MCNC: 3.4 G/DL (ref 3.5–5.2)
ALBUMIN/GLOB SERPL: 1.4 G/DL
ALP SERPL-CCNC: 77 U/L (ref 39–117)
ALT SERPL W P-5'-P-CCNC: 20 U/L (ref 1–33)
ANION GAP SERPL CALCULATED.3IONS-SCNC: 11 MMOL/L (ref 5–15)
AST SERPL-CCNC: 13 U/L (ref 1–32)
BASOPHILS # BLD AUTO: 0.02 10*3/MM3 (ref 0–0.2)
BASOPHILS NFR BLD AUTO: 0.2 % (ref 0–1.5)
BILIRUB SERPL-MCNC: 0.2 MG/DL (ref 0–1.2)
BUN SERPL-MCNC: 47 MG/DL (ref 8–23)
BUN/CREAT SERPL: 15.2 (ref 7–25)
CALCIUM SPEC-SCNC: 8.4 MG/DL (ref 8.6–10.5)
CHLORIDE SERPL-SCNC: 105 MMOL/L (ref 98–107)
CO2 SERPL-SCNC: 22 MMOL/L (ref 22–29)
CREAT SERPL-MCNC: 3.09 MG/DL (ref 0.57–1)
DEPRECATED RDW RBC AUTO: 51.9 FL (ref 37–54)
EOSINOPHIL # BLD AUTO: 0 10*3/MM3 (ref 0–0.4)
EOSINOPHIL NFR BLD AUTO: 0 % (ref 0.3–6.2)
ERYTHROCYTE [DISTWIDTH] IN BLOOD BY AUTOMATED COUNT: 14.8 % (ref 12.3–15.4)
ERYTHROCYTE [SEDIMENTATION RATE] IN BLOOD: 37 MM/HR (ref 0–20)
FERRITIN SERPL-MCNC: 424 NG/ML (ref 13–150)
GFR SERPL CREATININE-BSD FRML MDRD: 15 ML/MIN/1.73
GLOBULIN UR ELPH-MCNC: 2.5 GM/DL
GLUCOSE SERPL-MCNC: 263 MG/DL (ref 65–99)
HCT VFR BLD AUTO: 30.4 % (ref 34–46.6)
HGB BLD-MCNC: 9.8 G/DL (ref 12–15.9)
HOLD SPECIMEN: NORMAL
IMM GRANULOCYTES # BLD AUTO: 0.08 10*3/MM3 (ref 0–0.05)
IMM GRANULOCYTES NFR BLD AUTO: 0.7 % (ref 0–0.5)
IRON 24H UR-MRATE: 59 MCG/DL (ref 37–145)
IRON SATN MFR SERPL: 22 % (ref 20–50)
LYMPHOCYTES # BLD AUTO: 0.74 10*3/MM3 (ref 0.7–3.1)
LYMPHOCYTES NFR BLD AUTO: 6.3 % (ref 19.6–45.3)
MCH RBC QN AUTO: 30.6 PG (ref 26.6–33)
MCHC RBC AUTO-ENTMCNC: 32.2 G/DL (ref 31.5–35.7)
MCV RBC AUTO: 95 FL (ref 79–97)
MONOCYTES # BLD AUTO: 0.63 10*3/MM3 (ref 0.1–0.9)
MONOCYTES NFR BLD AUTO: 5.4 % (ref 5–12)
NEUTROPHILS NFR BLD AUTO: 10.24 10*3/MM3 (ref 1.7–7)
NEUTROPHILS NFR BLD AUTO: 87.4 % (ref 42.7–76)
NRBC BLD AUTO-RTO: 0 /100 WBC (ref 0–0.2)
PLATELET # BLD AUTO: 179 10*3/MM3 (ref 140–450)
PMV BLD AUTO: 10.4 FL (ref 6–12)
POTASSIUM SERPL-SCNC: 4.6 MMOL/L (ref 3.5–5.2)
PROT SERPL-MCNC: 5.9 G/DL (ref 6–8.5)
RBC # BLD AUTO: 3.2 10*6/MM3 (ref 3.77–5.28)
SODIUM SERPL-SCNC: 138 MMOL/L (ref 136–145)
TIBC SERPL-MCNC: 273 MCG/DL (ref 298–536)
TRANSFERRIN SERPL-MCNC: 183 MG/DL (ref 200–360)
WBC # BLD AUTO: 11.71 10*3/MM3 (ref 3.4–10.8)

## 2021-07-28 PROCEDURE — 85025 COMPLETE CBC W/AUTO DIFF WBC: CPT

## 2021-07-28 PROCEDURE — 83540 ASSAY OF IRON: CPT

## 2021-07-28 PROCEDURE — 84466 ASSAY OF TRANSFERRIN: CPT

## 2021-07-28 PROCEDURE — 25010000002 EPOETIN ALFA-EPBX 40000 UNIT/ML SOLUTION: Performed by: NURSE PRACTITIONER

## 2021-07-28 PROCEDURE — 99214 OFFICE O/P EST MOD 30 MIN: CPT | Performed by: INTERNAL MEDICINE

## 2021-07-28 PROCEDURE — 99213 OFFICE O/P EST LOW 20 MIN: CPT | Performed by: NURSE PRACTITIONER

## 2021-07-28 PROCEDURE — 36415 COLL VENOUS BLD VENIPUNCTURE: CPT

## 2021-07-28 PROCEDURE — 80053 COMPREHEN METABOLIC PANEL: CPT

## 2021-07-28 PROCEDURE — 82728 ASSAY OF FERRITIN: CPT

## 2021-07-28 PROCEDURE — 96372 THER/PROPH/DIAG INJ SC/IM: CPT

## 2021-07-28 PROCEDURE — 85651 RBC SED RATE NONAUTOMATED: CPT

## 2021-07-28 RX ORDER — MAGNESIUM GLUCONATE 27 MG(500)
27 TABLET ORAL 2 TIMES DAILY
COMMUNITY
End: 2021-08-19

## 2021-07-28 RX ADMIN — EPOETIN ALFA-EPBX 40000 UNITS: 40000 INJECTION, SOLUTION INTRAVENOUS; SUBCUTANEOUS at 09:26

## 2021-07-28 NOTE — PROGRESS NOTES
Maria C Shrestha is a 61 y.o. female who presents for  evaluation of   Type 2 diabetes                                       This was a Telehealth Encounter. Benefits and Disadvantages of a Telehealth Visit were discussed and accepted by patient. .  Patient agreed to receive service through Telehealth visit as patient is being compliant with social distancing recommendations imparted by CDC.     You have chosen to receive care through a telehealth visit.  Do you consent to use a video/audio connection for your medical care today? Yes        Primary Care / Referring Provider  Ole Soliman MD    History of Present Illness  Duration/Timing:  Diabetes mellitus type 2          Severity (Complications/Hospitalizations)  Secondary Microvascular Complications:  Diabetic Nephropathy-ESRD, No Diabetic Neuropathy    Macrovascular  , Carotid Artery Disease      Context  Diabetes Regimen:  Insulin, Compliant with regimen  Blood Glucose Readings         Diet    counts carbs, eating only 45 g cho per meal     Exercise:  Does not exercise    Associated Signs/Symptoms  Hyperglycemic Symptoms:  No polyuria, No polydipsia, No polyphagia, Weight loss with keto diet before but not now   Hypoglycemic Episodes:  No documented hypoglycemia       Review of Systems    Review of Systems   Psychiatric/Behavioral: Positive for sleep disturbance. The patient is nervous/anxious.       daughter undergoing divorce   Objective:       Physical Exam   Constitutional: No distress.   HENT:   Head: Normocephalic.   Pulmonary/Chest: Effort normal.   Musculoskeletal:         General: No edema.   Skin: She is not diaphoretic.         Lab Review      Lab Results   Component Value Date    WBC 11.71 (H) 07/28/2021    HGB 9.8 (L) 07/28/2021    HCT 30.4 (L) 07/28/2021    MCV 95.0 07/28/2021     07/28/2021     Lab Results   Component Value Date    GLUCOSE 263 (H) 07/28/2021    BUN 47 (H) 07/28/2021    CREATININE 3.09 (H) 07/28/2021    EGFRIFNONA 15 (L)  07/28/2021    EGFRIFAFRI  07/21/2021      Comment:      <15 Indicative of kidney failure.    BCR 15.2 07/28/2021    K 4.6 07/28/2021    CO2 22.0 07/28/2021    CALCIUM 8.4 (L) 07/28/2021    ALBUMIN 3.40 (L) 07/28/2021    AST 13 07/28/2021    ALT 20 07/28/2021           Assessment/Plan       ICD-10-CM ICD-9-CM   1. Type 2 diabetes mellitus with stage 4 chronic kidney disease, with long-term current use of insulin (CMS/HCC)  E11.22 250.40    N18.4 585.4    Z79.4 V58.67   2. Hypertension associated with diabetes (CMS/HCC)  E11.59 250.80    I15.2 401.9   3. Mixed diabetic hyperlipidemia associated with type 2 diabetes mellitus (CMS/HCC)  E11.69 250.80    E78.2 272.2   4. Acquired hypothyroidism  E03.9 244.9       Glycemic Management:   Lab Results   Component Value Date    HGBA1C 7.0 (H) 07/21/2021    HGBA1C 7.5 (H) 03/17/2021    HGBA1C 6.6 (H) 10/14/2020     Lab Results   Component Value Date    GLUCOSE 263 (H) 07/28/2021    BUN 47 (H) 07/28/2021    CREATININE 3.09 (H) 07/28/2021    EGFRIFNONA 15 (L) 07/28/2021    EGFRIFAFRI  07/21/2021      Comment:      <15 Indicative of kidney failure.    BCR 15.2 07/28/2021    CO2 22.0 07/28/2021    CALCIUM 8.4 (L) 07/28/2021    ALBUMIN 3.40 (L) 07/28/2021    AST 13 07/28/2021    ALT 20 07/28/2021     Lab Results   Component Value Date    WBC 11.71 (H) 07/28/2021    HGB 9.8 (L) 07/28/2021    HCT 30.4 (L) 07/28/2021    MCV 95.0 07/28/2021     07/28/2021     Lab Results   Component Value Date    CREATININE 3.09 (H) 07/28/2021    CREATININE 3.45 (H) 07/21/2021    CREATININE 3.30 (H) 07/14/2021    CREATININE 2.92 (H) 06/30/2021    CREATININE 3.3 (H) 06/18/2021        Using deidra   Read over the phone   28% above - 25%  72% in target  - 73%        Trulicity 1.5 mg weekly -- increase to 3 mg weekly     Humulin U 500 before     Lantus 160 at night change to 145 -- 150 --- now decrease to 100 -- increase to 120 -80    Humalog 15 units ( 1:3 ratio  )     invokana , I wanted to add but  GFR less than 30     Was on dexamethasone by hematology for inflammation  ? This has to stop . We need a dx not only inflammation ?         Lipid Management    Lab Results   Component Value Date    TRIG 116 07/21/2021    TRIG 355 (H) 03/17/2021    TRIG 197 (H) 10/14/2020     Lab Results   Component Value Date    HDL 53 07/21/2021    HDL 41 (L) 03/17/2021    HDL 42 (L) 10/14/2020     No components found for: LDLCALC  Lab Results   Component Value Date    LDL 27 07/21/2021    LDL 81 03/17/2021    LDL 58 10/14/2020     Lab Results   Component Value Date    LDL 27 07/21/2021    LDL 81 03/17/2021    LDL 58 10/14/2020         crestor and repatha    Blood Pressure Management:    There were no vitals filed for this visit.      Per nephrology - just had fistula sx       Microvascular Complication Monitoring:  No Microalbuminuria, No Diabetic Retinopathy, Date of last eye exam 07/18/2019, No Diabetic Neuropathy  on ACE i and coreg 12.5 po bid     ckd stage V    followed by nephrology     I suggest that she doesn't consume more than 140 g protein per day   Plant better than animal but restricted on keto     --      Lab Results   Component Value Date    CREATININE 3.09 (H) 07/28/2021    BUN 47 (H) 07/28/2021     07/28/2021    K 4.6 07/28/2021     07/28/2021    CO2 22.0 07/28/2021         Lab Results   Component Value Date    CREATININE 3.09 (H) 07/28/2021    CREATININE 3.45 (H) 07/21/2021    CREATININE 3.30 (H) 07/14/2021         Immunizations:  Last pneumococcal immunization pneumovax before age 65     Flu Shot will have in Mid Nov 2016       Preventive Care:  No smoking      Weight Related:   Wt Readings from Last 3 Encounters:   07/28/21 (!) 179 kg (395 lb)   07/28/21 (!) 178 kg (392 lb)   07/14/21 (!) 158 kg (347 lb 12.8 oz)     There is no height or weight on file to calculate BMI.      prefers no bariatric surgery    Now on cpap       Bone Health    Lab Results   Component Value Date    .0 (H)  07/21/2021    CALCIUM 8.4 (L) 07/28/2021     For JARETH     Was having hypercalcemia with rocatrol 0.25 three times weekly and on calcium    Now on sensipar 30 mg daily but without calcium -- not on sensipar anymore     Start calcium low dose 400 mg daily     DXA No 2018, osteopenia left femoral neck      Monitor PTH , no more than 150 , no less than 60       Thyroid Health  Lab Results   Component Value Date    TSH 2.410 07/21/2021     On levothyroxine 50 ug daily - increase to 75 mcgs daily - increase to 88     Other Diabetes Related Aspects       Lab Results   Component Value Date    [id number] 455 07/21/2021     Lab Results   Component Value Date    WBC 11.71 (H) 07/28/2021    HGB 9.8 (L) 07/28/2021    HCT 30.4 (L) 07/28/2021    MCV 95.0 07/28/2021     07/28/2021     Lab Results   Component Value Date    IRON 59 07/28/2021    TIBC 273 (L) 07/28/2021    FERRITIN 424.00 (H) 07/28/2021       Anemia , managed by nephrology   Deciding vs epo vs iron   But now   DUB, may need hysterectomy       Systolic Murmur, AS? Echo   Could be due to anemia     Echo and carotid US from 7-17 , normal echo and less than 50% blockage in carotids, repeat

## 2021-07-28 NOTE — PROGRESS NOTES
Nutrition Services    Patient Name:  Maria C Shrestha  YOB: 1960  MRN: [id number]  Admit Date:  (Not on file)    Spoke briefly with pt regarding the 4 meal per day diet plan.  Pt states that she is currently in the hospital and had a procedure done yesterday, and is having further tests/procedures done today.  Pt states that she has really not done the plan very much this month, but does want to keep her August appointment.  Encouraged pt to notify the office in the event of further questions/struggles prior to her next appointment    Electronically signed by:  Jessika Talbot  07/28/21 08:57 CDT

## 2021-08-04 ENCOUNTER — APPOINTMENT (OUTPATIENT)
Dept: LAB | Facility: HOSPITAL | Age: 61
End: 2021-08-04

## 2021-08-04 ENCOUNTER — APPOINTMENT (OUTPATIENT)
Dept: ONCOLOGY | Facility: HOSPITAL | Age: 61
End: 2021-08-04

## 2021-08-05 ENCOUNTER — TELEPHONE (OUTPATIENT)
Dept: VASCULAR SURGERY | Age: 61
End: 2021-08-05

## 2021-08-05 NOTE — TELEPHONE ENCOUNTER
Patient is requesting a appt with Dr. Yaneth Alcala for a post procedure follow up from surgery. Due to the COVID decision tree [facility] was not able to schedule the appt. Please call patient to schedule.

## 2021-08-10 ENCOUNTER — HOSPITAL ENCOUNTER (INPATIENT)
Age: 61
LOS: 3 days | Discharge: HOME | DRG: 189 | End: 2021-08-13
Attending: HOSPITALIST | Admitting: HOSPITALIST
Payer: MEDICARE

## 2021-08-10 ENCOUNTER — TELEPHONE (OUTPATIENT)
Dept: FAMILY MEDICINE CLINIC | Facility: CLINIC | Age: 61
End: 2021-08-10

## 2021-08-10 ENCOUNTER — TELEPHONE (OUTPATIENT)
Dept: ONCOLOGY | Facility: CLINIC | Age: 61
End: 2021-08-10

## 2021-08-10 ENCOUNTER — APPOINTMENT (OUTPATIENT)
Dept: GENERAL RADIOLOGY | Age: 61
DRG: 189 | End: 2021-08-10
Payer: MEDICARE

## 2021-08-10 DIAGNOSIS — N18.9 CHRONIC KIDNEY DISEASE, UNSPECIFIED CKD STAGE: ICD-10-CM

## 2021-08-10 DIAGNOSIS — R09.02 HYPOXIA: Primary | ICD-10-CM

## 2021-08-10 DIAGNOSIS — E87.70 HYPERVOLEMIA, UNSPECIFIED HYPERVOLEMIA TYPE: ICD-10-CM

## 2021-08-10 PROBLEM — Z99.2 ACUTE HEMODIALYSIS ENCOUNTER (HCC): Status: ACTIVE | Noted: 2021-08-10

## 2021-08-10 PROBLEM — I10 HTN (HYPERTENSION): Status: ACTIVE | Noted: 2021-08-10

## 2021-08-10 PROBLEM — E11.9 DM (DIABETES MELLITUS) (HCC): Status: ACTIVE | Noted: 2021-08-10

## 2021-08-10 PROBLEM — J96.01 ACUTE HYPOXEMIC RESPIRATORY FAILURE (HCC): Status: ACTIVE | Noted: 2021-08-10

## 2021-08-10 LAB
ALBUMIN SERPL-MCNC: 2.9 G/DL (ref 3.5–5.2)
ALP BLD-CCNC: 93 U/L (ref 35–104)
ALT SERPL-CCNC: 6 U/L (ref 5–33)
ANION GAP SERPL CALCULATED.3IONS-SCNC: 11 MMOL/L (ref 7–19)
AST SERPL-CCNC: <5 U/L (ref 5–32)
BACTERIA: NEGATIVE /HPF
BASE EXCESS VENOUS: -2 MMOL/L
BASOPHILS ABSOLUTE: 0 K/UL (ref 0–0.2)
BASOPHILS RELATIVE PERCENT: 0.3 % (ref 0–1)
BILIRUB SERPL-MCNC: 0.3 MG/DL (ref 0.2–1.2)
BILIRUBIN URINE: NEGATIVE
BLOOD, URINE: ABNORMAL
BUN BLDV-MCNC: 30 MG/DL (ref 8–23)
CALCIUM SERPL-MCNC: 8.4 MG/DL (ref 8.8–10.2)
CARBOXYHEMOGLOBIN: 2.3 %
CHLORIDE BLD-SCNC: 105 MMOL/L (ref 98–111)
CLARITY: CLEAR
CO2: 23 MMOL/L (ref 22–29)
COLOR: YELLOW
CREAT SERPL-MCNC: 3.5 MG/DL (ref 0.5–0.9)
CRYSTALS, UA: ABNORMAL /HPF
EOSINOPHILS ABSOLUTE: 0.1 K/UL (ref 0–0.6)
EOSINOPHILS RELATIVE PERCENT: 1.1 % (ref 0–5)
EPITHELIAL CELLS, UA: 5 /HPF (ref 0–5)
GFR AFRICAN AMERICAN: 16
GFR NON-AFRICAN AMERICAN: 13
GLUCOSE BLD-MCNC: 183 MG/DL (ref 70–99)
GLUCOSE BLD-MCNC: 238 MG/DL (ref 74–109)
GLUCOSE URINE: 500 MG/DL
HBA1C MFR BLD: 6.6 % (ref 4–6)
HCO3 VENOUS: 22 MMOL/L (ref 23–29)
HCT VFR BLD CALC: 30.8 % (ref 37–47)
HEMOGLOBIN: 9.7 G/DL (ref 12–16)
HYALINE CASTS: 1 /HPF (ref 0–8)
IMMATURE GRANULOCYTES #: 0.1 K/UL
KETONES, URINE: ABNORMAL MG/DL
LEUKOCYTE ESTERASE, URINE: ABNORMAL
LYMPHOCYTES ABSOLUTE: 1.1 K/UL (ref 1.1–4.5)
LYMPHOCYTES RELATIVE PERCENT: 10.6 % (ref 20–40)
MAGNESIUM: 1.9 MG/DL (ref 1.6–2.4)
MCH RBC QN AUTO: 30.7 PG (ref 27–31)
MCHC RBC AUTO-ENTMCNC: 31.5 G/DL (ref 33–37)
MCV RBC AUTO: 97.5 FL (ref 81–99)
METHEMOGLOBIN VENOUS: 0.5 %
MONOCYTES ABSOLUTE: 0.5 K/UL (ref 0–0.9)
MONOCYTES RELATIVE PERCENT: 4.2 % (ref 0–10)
NEUTROPHILS ABSOLUTE: 8.8 K/UL (ref 1.5–7.5)
NEUTROPHILS RELATIVE PERCENT: 82.8 % (ref 50–65)
NITRITE, URINE: NEGATIVE
O2 CONTENT, VEN: 8 ML/DL
O2 SAT, VEN: 67 %
PCO2, VEN: 36 MMHG (ref 40–50)
PDW BLD-RTO: 15.4 % (ref 11.5–14.5)
PERFORMED ON: ABNORMAL
PH UA: 7.5 (ref 5–8)
PH VENOUS: 7.4 (ref 7.35–7.45)
PLATELET # BLD: 185 K/UL (ref 130–400)
PMV BLD AUTO: 10.2 FL (ref 9.4–12.3)
PO2, VEN: 31 MMHG
POTASSIUM REFLEX MAGNESIUM: 3.9 MMOL/L (ref 3.5–5)
PRO-BNP: 2288 PG/ML (ref 0–900)
PROTEIN UA: =>1000 MG/DL
RBC # BLD: 3.16 M/UL (ref 4.2–5.4)
RBC UA: 1 /HPF (ref 0–4)
SARS-COV-2, NAAT: NOT DETECTED
SODIUM BLD-SCNC: 139 MMOL/L (ref 136–145)
SPECIFIC GRAVITY UA: 1.02 (ref 1–1.03)
TOTAL PROTEIN: 6.1 G/DL (ref 6.6–8.7)
TROPONIN: <0.01 NG/ML (ref 0–0.03)
UROBILINOGEN, URINE: 1 E.U./DL
WBC # BLD: 10.6 K/UL (ref 4.8–10.8)
WBC UA: 6 /HPF (ref 0–5)

## 2021-08-10 PROCEDURE — 80053 COMPREHEN METABOLIC PANEL: CPT

## 2021-08-10 PROCEDURE — 83036 HEMOGLOBIN GLYCOSYLATED A1C: CPT

## 2021-08-10 PROCEDURE — 84484 ASSAY OF TROPONIN QUANT: CPT

## 2021-08-10 PROCEDURE — 36600 WITHDRAWAL OF ARTERIAL BLOOD: CPT

## 2021-08-10 PROCEDURE — 94640 AIRWAY INHALATION TREATMENT: CPT

## 2021-08-10 PROCEDURE — 71045 X-RAY EXAM CHEST 1 VIEW: CPT

## 2021-08-10 PROCEDURE — 6370000000 HC RX 637 (ALT 250 FOR IP): Performed by: NURSE PRACTITIONER

## 2021-08-10 PROCEDURE — 1210000000 HC MED SURG R&B

## 2021-08-10 PROCEDURE — 81001 URINALYSIS AUTO W/SCOPE: CPT

## 2021-08-10 PROCEDURE — 82803 BLOOD GASES ANY COMBINATION: CPT

## 2021-08-10 PROCEDURE — 6360000002 HC RX W HCPCS: Performed by: NURSE PRACTITIONER

## 2021-08-10 PROCEDURE — 85025 COMPLETE CBC W/AUTO DIFF WBC: CPT

## 2021-08-10 PROCEDURE — 83880 ASSAY OF NATRIURETIC PEPTIDE: CPT

## 2021-08-10 PROCEDURE — 83735 ASSAY OF MAGNESIUM: CPT

## 2021-08-10 PROCEDURE — 99283 EMERGENCY DEPT VISIT LOW MDM: CPT

## 2021-08-10 PROCEDURE — 87635 SARS-COV-2 COVID-19 AMP PRB: CPT

## 2021-08-10 PROCEDURE — 93005 ELECTROCARDIOGRAM TRACING: CPT | Performed by: NURSE PRACTITIONER

## 2021-08-10 PROCEDURE — 36415 COLL VENOUS BLD VENIPUNCTURE: CPT

## 2021-08-10 RX ORDER — LEVOTHYROXINE SODIUM 88 UG/1
88 TABLET ORAL DAILY
Status: DISCONTINUED | OUTPATIENT
Start: 2021-08-11 | End: 2021-08-13 | Stop reason: HOSPADM

## 2021-08-10 RX ORDER — DEXTROSE MONOHYDRATE 25 G/50ML
12.5 INJECTION, SOLUTION INTRAVENOUS PRN
Status: DISCONTINUED | OUTPATIENT
Start: 2021-08-10 | End: 2021-08-13 | Stop reason: HOSPADM

## 2021-08-10 RX ORDER — IPRATROPIUM BROMIDE AND ALBUTEROL SULFATE 2.5; .5 MG/3ML; MG/3ML
1 SOLUTION RESPIRATORY (INHALATION) ONCE
Status: COMPLETED | OUTPATIENT
Start: 2021-08-10 | End: 2021-08-10

## 2021-08-10 RX ORDER — POTASSIUM CHLORIDE 20 MEQ/1
40 TABLET, EXTENDED RELEASE ORAL PRN
Status: DISCONTINUED | OUTPATIENT
Start: 2021-08-10 | End: 2021-08-13 | Stop reason: HOSPADM

## 2021-08-10 RX ORDER — ALLOPURINOL 100 MG/1
100 TABLET ORAL 2 TIMES DAILY
Status: DISCONTINUED | OUTPATIENT
Start: 2021-08-10 | End: 2021-08-13 | Stop reason: HOSPADM

## 2021-08-10 RX ORDER — SODIUM CHLORIDE 9 MG/ML
25 INJECTION, SOLUTION INTRAVENOUS PRN
Status: DISCONTINUED | OUTPATIENT
Start: 2021-08-10 | End: 2021-08-13 | Stop reason: HOSPADM

## 2021-08-10 RX ORDER — MAGNESIUM SULFATE 1 G/100ML
1000 INJECTION INTRAVENOUS PRN
Status: DISCONTINUED | OUTPATIENT
Start: 2021-08-10 | End: 2021-08-13 | Stop reason: HOSPADM

## 2021-08-10 RX ORDER — CARVEDILOL 12.5 MG/1
12.5 TABLET ORAL 2 TIMES DAILY
Status: DISCONTINUED | OUTPATIENT
Start: 2021-08-10 | End: 2021-08-13 | Stop reason: HOSPADM

## 2021-08-10 RX ORDER — CITALOPRAM 20 MG/1
40 TABLET ORAL DAILY
Status: DISCONTINUED | OUTPATIENT
Start: 2021-08-11 | End: 2021-08-13 | Stop reason: HOSPADM

## 2021-08-10 RX ORDER — INSULIN GLARGINE 100 [IU]/ML
25 INJECTION, SOLUTION SUBCUTANEOUS NIGHTLY
Status: DISCONTINUED | OUTPATIENT
Start: 2021-08-10 | End: 2021-08-13 | Stop reason: HOSPADM

## 2021-08-10 RX ORDER — IPRATROPIUM BROMIDE AND ALBUTEROL SULFATE 2.5; .5 MG/3ML; MG/3ML
1 SOLUTION RESPIRATORY (INHALATION)
Status: DISCONTINUED | OUTPATIENT
Start: 2021-08-11 | End: 2021-08-13 | Stop reason: HOSPADM

## 2021-08-10 RX ORDER — ROSUVASTATIN CALCIUM 20 MG/1
20 TABLET, COATED ORAL DAILY
Status: DISCONTINUED | OUTPATIENT
Start: 2021-08-11 | End: 2021-08-13 | Stop reason: HOSPADM

## 2021-08-10 RX ORDER — ACETAMINOPHEN 325 MG/1
650 TABLET ORAL EVERY 6 HOURS PRN
Status: DISCONTINUED | OUTPATIENT
Start: 2021-08-10 | End: 2021-08-13 | Stop reason: HOSPADM

## 2021-08-10 RX ORDER — NICOTINE POLACRILEX 4 MG
15 LOZENGE BUCCAL PRN
Status: DISCONTINUED | OUTPATIENT
Start: 2021-08-10 | End: 2021-08-13 | Stop reason: HOSPADM

## 2021-08-10 RX ORDER — MAGNESIUM OXIDE 400 MG/1
400 TABLET ORAL DAILY
COMMUNITY
End: 2022-05-14

## 2021-08-10 RX ORDER — LAMOTRIGINE 100 MG/1
100 TABLET ORAL DAILY
Status: DISCONTINUED | OUTPATIENT
Start: 2021-08-11 | End: 2021-08-13 | Stop reason: HOSPADM

## 2021-08-10 RX ORDER — SODIUM BICARBONATE 650 MG/1
1300 TABLET ORAL 3 TIMES DAILY
Status: DISCONTINUED | OUTPATIENT
Start: 2021-08-10 | End: 2021-08-13

## 2021-08-10 RX ORDER — ONDANSETRON 4 MG/1
4 TABLET, ORALLY DISINTEGRATING ORAL EVERY 8 HOURS PRN
Status: DISCONTINUED | OUTPATIENT
Start: 2021-08-10 | End: 2021-08-13 | Stop reason: HOSPADM

## 2021-08-10 RX ORDER — HYDRALAZINE HYDROCHLORIDE 20 MG/ML
10 INJECTION INTRAMUSCULAR; INTRAVENOUS EVERY 6 HOURS PRN
Status: DISCONTINUED | OUTPATIENT
Start: 2021-08-10 | End: 2021-08-13 | Stop reason: HOSPADM

## 2021-08-10 RX ORDER — SODIUM CHLORIDE 0.9 % (FLUSH) 0.9 %
5-40 SYRINGE (ML) INJECTION EVERY 12 HOURS SCHEDULED
Status: DISCONTINUED | OUTPATIENT
Start: 2021-08-10 | End: 2021-08-13 | Stop reason: HOSPADM

## 2021-08-10 RX ORDER — POTASSIUM CHLORIDE 7.45 MG/ML
10 INJECTION INTRAVENOUS PRN
Status: DISCONTINUED | OUTPATIENT
Start: 2021-08-10 | End: 2021-08-13 | Stop reason: HOSPADM

## 2021-08-10 RX ORDER — ACETAMINOPHEN 650 MG/1
650 SUPPOSITORY RECTAL EVERY 6 HOURS PRN
Status: DISCONTINUED | OUTPATIENT
Start: 2021-08-10 | End: 2021-08-13 | Stop reason: HOSPADM

## 2021-08-10 RX ORDER — ASPIRIN 81 MG/1
81 TABLET, CHEWABLE ORAL DAILY
Status: DISCONTINUED | OUTPATIENT
Start: 2021-08-11 | End: 2021-08-13 | Stop reason: HOSPADM

## 2021-08-10 RX ORDER — AMLODIPINE BESYLATE 5 MG/1
10 TABLET ORAL DAILY
Status: DISCONTINUED | OUTPATIENT
Start: 2021-08-11 | End: 2021-08-13 | Stop reason: HOSPADM

## 2021-08-10 RX ORDER — HEPARIN SODIUM 5000 [USP'U]/ML
5000 INJECTION, SOLUTION INTRAVENOUS; SUBCUTANEOUS EVERY 8 HOURS SCHEDULED
Status: DISCONTINUED | OUTPATIENT
Start: 2021-08-10 | End: 2021-08-13 | Stop reason: HOSPADM

## 2021-08-10 RX ORDER — ONDANSETRON 2 MG/ML
4 INJECTION INTRAMUSCULAR; INTRAVENOUS EVERY 6 HOURS PRN
Status: DISCONTINUED | OUTPATIENT
Start: 2021-08-10 | End: 2021-08-13 | Stop reason: HOSPADM

## 2021-08-10 RX ORDER — SODIUM CHLORIDE 0.9 % (FLUSH) 0.9 %
5-40 SYRINGE (ML) INJECTION PRN
Status: DISCONTINUED | OUTPATIENT
Start: 2021-08-10 | End: 2021-08-13 | Stop reason: HOSPADM

## 2021-08-10 RX ORDER — DEXTROSE MONOHYDRATE 50 MG/ML
100 INJECTION, SOLUTION INTRAVENOUS PRN
Status: DISCONTINUED | OUTPATIENT
Start: 2021-08-10 | End: 2021-08-13 | Stop reason: HOSPADM

## 2021-08-10 RX ADMIN — HEPARIN SODIUM 5000 UNITS: 5000 INJECTION INTRAVENOUS; SUBCUTANEOUS at 23:17

## 2021-08-10 RX ADMIN — IPRATROPIUM BROMIDE AND ALBUTEROL SULFATE 1 AMPULE: 2.5; .5 SOLUTION RESPIRATORY (INHALATION) at 18:50

## 2021-08-10 RX ADMIN — INSULIN GLARGINE 25 UNITS: 100 INJECTION, SOLUTION SUBCUTANEOUS at 23:17

## 2021-08-10 ASSESSMENT — ENCOUNTER SYMPTOMS
COUGH: 1
ABDOMINAL DISTENTION: 0
COUGH: 0
SORE THROAT: 0
CONSTIPATION: 0
TROUBLE SWALLOWING: 0
SHORTNESS OF BREATH: 1
SPUTUM PRODUCTION: 0
BLOOD IN STOOL: 0
SINUS PAIN: 0
WHEEZING: 0
VOMITING: 0
ABDOMINAL PAIN: 0
NAUSEA: 0
DIARRHEA: 0

## 2021-08-10 ASSESSMENT — PAIN SCALES - GENERAL: PAINLEVEL_OUTOF10: 0

## 2021-08-10 NOTE — TELEPHONE ENCOUNTER
Spoke with Maria C about her breathing issues and that she needs to call her PCP about her concerns and let them know about her oxygen level.  Verbalized understanding.

## 2021-08-10 NOTE — ED PROVIDER NOTES
[facility] [place] [facility] EMERGENCY DEPT  eMERGENCY dEPARTMENT eNCOUnter      Pt Name: Nat Lal  MRN: [id number]  [place] 1960  Date of evaluation: 8/10/2021  Provider: Santa Chapman, [address]       Chief Complaint   Patient presents with    Shortness of Breath         HISTORY OF PRESENT ILLNESS   (Location/Symptom, Timing/Onset,Context/Setting, Quality, Duration, Modifying Factors, Severity)  Note limiting factors. Nat Lal is a 64 y.o. female who presents to the emergency department with low oxygen saturations. She states this is mainly with exertion. She denies any chest pain. She denies any fever or coughing. Patient recently had a fistula placed for dialysis. She is not had any dialysis treatments yet. Sees Dr Myriam Jara    The history is provided by the patient. Shortness of Breath  Severity:  Moderate  Onset quality:  Sudden  Context: activity    Associated symptoms: no abdominal pain, no chest pain, no cough and no sputum production    Risk factors: obesity        NursingNotes were reviewed. REVIEW OF SYSTEMS    (2-9 systems for level 4, 10 or more for level 5)     Review of Systems   Respiratory: Positive for shortness of breath. Negative for cough and sputum production. Cardiovascular: Negative for chest pain. Gastrointestinal: Negative for abdominal pain. Except as noted above the remainder of the review of systems was reviewed and negative.        PAST MEDICAL HISTORY     Past Medical History:   Diagnosis Date    Anxiety     Arthritis     CKD (chronic kidney disease)     stage 4    Colon polyp     Depression     Embolism - blood clot 2004    Hx of blood clots     Hyperlipidemia     Hypertension     Obesity     Sleep apnea     Thyroid disease     Type II or unspecified type diabetes mellitus without mention of complication, not stated as uncontrolled          SURGICALHISTORY       Past Surgical History:   Procedure Laterality Date    CHOLECYSTECTOMY      (LANTUS SC)    Inject 90 Units into the skin daily With Evening Meals    INSULIN LISPRO (HUMALOG KWIKPEN) 200 UNIT/ML SOPN PEN    Inject 25-80 Units into the skin 3 times daily (with meals)     INSULIN PEN NEEDLE (B-D ULTRAFINE III SHORT PEN) 31G X 8 MM MISC    USE AS DIRECTED TO INJECT FOUR TIMES DAILY    INSULIN SYRINGE-NEEDLE U-100 (BD INSULIN SYRINGE U/F) 31G X 5/16\" 1 ML MISC    AS DIRECTED FOUR TIMES A DAY    LAMOTRIGINE (LAMICTAL) 100 MG TABLET    Take 100 mg by mouth daily Indications: 1/2 tablet with dinner    LEVOTHYROXINE (SYNTHROID) 88 MCG TABLET    Take 88 mcg by mouth Daily     LISINOPRIL (PRINIVIL;ZESTRIL) 40 MG TABLET    Take 40 mg by mouth 2 times daily Indications: High Blood Pressure Disorder    OMEGA-3 FATTY ACIDS (FISH OIL) 1000 MG CAPS    Take 1,000 mg by mouth Daily with supper     POTASSIUM 99 MG TABS    Take 1 tablet by mouth nightly    ROSUVASTATIN (CRESTOR) 20 MG TABLET    Take 20 mg by mouth daily     SODIUM BICARBONATE 650 MG TABLET    Take 1,300 mg by mouth 3 times daily    TRULICITY 3 AA/1.6OE SOPN    3 mg once a week        ALLERGIES     Codeine    FAMILY HISTORY       Family History   Problem Relation Age of Onset    Lung Cancer Mother     Brain Cancer Mother     Heart Attack Father     Prostate Cancer Father     Heart Disease Father     Stroke Father     Uterine Cancer Maternal Grandmother     Cervical Cancer Maternal Grandmother     Diabetes Maternal Grandfather     Other Maternal Grandfather         histoplasmosis    Diabetes Paternal Grandfather           SOCIAL HISTORY       Social History     Socioeconomic History    Marital status:      Spouse name: Not on file    Number of children: Not on file    Years of education: Not on file    Highest education level: Not on file   Occupational History    Not on file   Tobacco Use    Smoking status: Never Smoker    Smokeless tobacco: Never Used   Vaping Use    Vaping Use: Never used   Substance and Sexual Activity    Alcohol use: No    Drug use: No    Sexual activity: Never   Other Topics Concern    Not on file   Social History Narrative    Not on file     Social Determinants of Health     Financial Resource Strain:     Difficulty of Paying Living Expenses:    Food Insecurity:     Worried About Running Out of Food in the Last Year:     [address] in the Last Year:    Transportation Needs:     Lack of Transportation (Medical):  Lack of Transportation (Non-Medical):    Physical Activity:     Days of Exercise per Week:     Minutes of Exercise per Session:    Stress:     Feeling of Stress :    Social Connections:     Frequency of Communication with Friends and Family:     Frequency of Social Gatherings with Friends and Family:     Attends [employer] Services:     Active Member of Clubs or Organizations:     Attends Club or Organization Meetings:     Marital Status:    Intimate Partner Violence:     Fear of Current or Ex-Partner:     Emotionally Abused:     Physically Abused:     Sexually Abused:        SCREENINGS      @FLOW([id number])@      PHYSICAL EXAM    (up to 7 for level 4, 8 or more for level 5)     ED Triage Vitals [08/10/21 1610]   BP Temp Temp src Pulse Resp SpO2 Height Weight   (!) 206/87 98.5 °F (36.9 °C) -- 92 30 (!) 85 % -- (!) 383 lb (173.7 kg)       Physical Exam  Vitals and nursing note reviewed. Constitutional:       Appearance: She is well-developed. She is obese. HENT:      Head: Normocephalic and atraumatic. Eyes:      General: No scleral icterus. Right eye: No discharge. Left eye: No discharge. Cardiovascular:      Rate and Rhythm: Normal rate and regular rhythm. Pulmonary:      Effort: Pulmonary effort is normal. No respiratory distress. Breath sounds: Normal breath sounds. Musculoskeletal:      Cervical back: Normal range of motion and neck supple. Neurological:      Mental Status: She is alert.    Psychiatric:         Behavior: Behavior normal.         DIAGNOSTIC RESULTS     EKG: All EKG's are interpreted by the Emergency Department Physician who either signs or Co-signsthis chart in the absence of a cardiologist.  74 sinus rhythm PAC QT interval is 453 no T wave changes read at 1650 by Dr. Zack Weaver:   Gerald Johnson such as CT, Ultrasound and MRI are read by the radiologist. Davin Christian radiographic images are visualized and preliminarily interpreted by the emergency physician with the below findings:      Interpretation per the Radiologist below, if available at the time of this note:    XR CHEST PORTABLE   Final Result   1.. Congestive failure with interstitial and alveolar edema.    Signed by Dr Darwin Poe            ED BEDSIDEULTRASOUND:   Performed by ED Physician -none    LABS:  Labs Reviewed   CBC WITH AUTO DIFFERENTIAL - Abnormal; Notable for the following components:       Result Value    RBC 3.16 (*)     Hemoglobin 9.7 (*)     Hematocrit 30.8 (*)     MCHC 31.5 (*)     RDW 15.4 (*)     Neutrophils % 82.8 (*)     Lymphocytes % 10.6 (*)     Neutrophils Absolute 8.8 (*)     All other components within normal limits   COMPREHENSIVE METABOLIC PANEL W/ REFLEX TO MG FOR LOW K - Abnormal; Notable for the following components:    Glucose 238 (*)     BUN 30 (*)     CREATININE 3.5 (*)     GFR Non- 13 (*)     GFR  16 (*)     Calcium 8.4 (*)     Total Protein 6.1 (*)     Albumin 2.9 (*)     All other components within normal limits   BRAIN NATRIURETIC PEPTIDE - Abnormal; Notable for the following components:    Pro-BNP 2,288 (*)     All other components within normal limits   VENOUS BLD GAS - Abnormal; Notable for the following components:    pCO2, Issa 36.0 (*)     HCO3, Venous 22 (*)     All other components within normal limits   URINE RT REFLEX TO CULTURE - Abnormal; Notable for the following components:    Glucose, Ur 500 (*)     Ketones, Urine TRACE (*)     Blood, Urine TRACE (*)     Leukocyte Esterase, Urine SMALL (*)     All other components within normal limits   MICROSCOPIC URINALYSIS - Abnormal; Notable for the following components:    Bacteria, UA NEGATIVE (*)     Crystals, UA NEG (*)     WBC, UA 6 (*)     All other components within normal limits   COVID-19, RAPID   TROPONIN   BLOOD GAS, VENOUS       All other labs were within normal range or not returned as of this dictation. EMERGENCY DEPARTMENT COURSE and DIFFERENTIALDIAGNOSIS/MDM:   Vitals:    Vitals:    08/10/21 1610 08/10/21 1713 08/10/21 1830 08/10/21 1912   BP: (!) 206/87 (!) 149/71 (!) 145/61 (!) 153/71   Pulse: 92 76 74 75   Resp: 30 16 16 20   Temp: 98.5 °F (36.9 °C)      SpO2: (!) 85% 93% 92% 96%   Weight: (!) 383 lb (173.7 kg)              MDM patient drops into the 70s with ambulation. Sitting in bed not talking her pulse ox will be in the 90s. Talking on the phone she drops down into the 80s.  2 L was applied. Spoke to Dr. Graciela Telles hospitalist who accepted patient for admission      CONSULTS:  [address]:  Unless otherwise noted below, none     Procedures    FINAL IMPRESSION      1. Hypoxia    2. Chronic kidney disease, unspecified CKD stage    3. Hypervolemia, unspecified hypervolemia type        DISPOSITION/PLAN   DISPOSITION Admitted 08/10/2021 07:54:18 PM      PATIENT REFERRED TO:  No follow-up provider specified.     DISCHARGE MEDICATIONS:  New Prescriptions    No medications on file          (Please note that portions of this note were completed with a voice recognitionprogram.  Efforts were made to edit the dictations but occasionally words are mis-transcribed.)    JAMES Ramires (electronically signed)         JAMES Ramires  08/10/21 2008

## 2021-08-10 NOTE — TELEPHONE ENCOUNTER
Patient called office, states she had called another office this am about her current symptoms she is experiencing, please see this TELEPHONE encounter in her chart. Patient having very frequent episodes of SOB, O2 sats running in the 80's. I advised patient to go to ER to be evaluated immediately and to make sure she wears a mask when she enters the ER. She voiced understanding.

## 2021-08-10 NOTE — TELEPHONE ENCOUNTER
Caller: Maria C Shrestha    Relationship: Self    Best call back number: [phone]    Who are you requesting to speak with (clinical staff, provider,  specific staff member): CLINICAL STAFF    What was the call regarding: MARIA C CALLED WITH SOME CONCERNS ABOUT HER LUNGS. SHE SAYS WHEN SHE LAST SAW JUAN A, JUAN A MENTIONED SOMETHING IN HER LUNGS. SHE SAYS SHE HAS STARTED TESTING HER OXYGEN LEVELS. SHE SAYS WHEN SHE IS SITTING DOWN, IT IS AROUND 93-96. WHEN SHE GETS UP DURING THE DAY, IT GOES TO 86. AT NIGHT WHEN SHE MOVES AROUND ITS AROUND 78-80. WHEN IT IS THIS LOW, SHE SAYS HER CHEST AND ARMS FEEL LIKE THEY ARE ON FIRE UNTIL HER LEVELS GO BACK TO 84. WHEN SHE'S ON HER CPAP, THE LEVELS ARE 95. SHE SAYS SHE TESTED NEGATIVE FOR COVID, BUT IS NOW CONCERNED THAT IT COULD BE SOMETHING ELSE WRONG. SHE WOULD LIKE A CALL BACK TO DISCUSS.    SHE ALSO SAYS THAT WHEN SHE LAST IN HER SURGERY ABOUT THREE WEEKS AGO., SHE WAS TOLD THAT HER O2 STATS KEPT GOING DOWN INTO THE 80S. SHE SAYS IT WAS PRETTY PERSISTANT.    Do you require a callback: YES

## 2021-08-11 ENCOUNTER — ANESTHESIA (OUTPATIENT)
Dept: OPERATING ROOM | Age: 61
DRG: 189 | End: 2021-08-11
Payer: MEDICARE

## 2021-08-11 ENCOUNTER — ANESTHESIA EVENT (OUTPATIENT)
Dept: OPERATING ROOM | Age: 61
DRG: 189 | End: 2021-08-11
Payer: MEDICARE

## 2021-08-11 ENCOUNTER — APPOINTMENT (OUTPATIENT)
Dept: INTERVENTIONAL RADIOLOGY/VASCULAR | Age: 61
DRG: 189 | End: 2021-08-11
Payer: MEDICARE

## 2021-08-11 VITALS — SYSTOLIC BLOOD PRESSURE: 127 MMHG | DIASTOLIC BLOOD PRESSURE: 58 MMHG | TEMPERATURE: 60.4 F | OXYGEN SATURATION: 89 %

## 2021-08-11 PROBLEM — N18.6 ESRD (END STAGE RENAL DISEASE) (HCC): Status: ACTIVE | Noted: 2018-07-24

## 2021-08-11 LAB
ABO/RH: NORMAL
ALBUMIN SERPL-MCNC: 2.8 G/DL (ref 3.5–5.2)
ALP BLD-CCNC: 88 U/L (ref 35–104)
ALT SERPL-CCNC: 6 U/L (ref 5–33)
ANION GAP SERPL CALCULATED.3IONS-SCNC: 11 MMOL/L (ref 7–19)
ANTIBODY SCREEN: NORMAL
AST SERPL-CCNC: 8 U/L (ref 5–32)
BASOPHILS ABSOLUTE: 0 K/UL (ref 0–0.2)
BASOPHILS RELATIVE PERCENT: 0.3 % (ref 0–1)
BILIRUB SERPL-MCNC: 0.3 MG/DL (ref 0.2–1.2)
BUN BLDV-MCNC: 30 MG/DL (ref 8–23)
CALCIUM SERPL-MCNC: 8.5 MG/DL (ref 8.8–10.2)
CHLORIDE BLD-SCNC: 104 MMOL/L (ref 98–111)
CO2: 23 MMOL/L (ref 22–29)
CREAT SERPL-MCNC: 3.5 MG/DL (ref 0.5–0.9)
CREATININE URINE: 91 MG/DL (ref 4.2–622)
EKG P AXIS: 37 DEGREES
EKG P-R INTERVAL: 224 MS
EKG Q-T INTERVAL: 442 MS
EKG QRS DURATION: 96 MS
EKG QTC CALCULATION (BAZETT): 468 MS
EKG T AXIS: 27 DEGREES
EOSINOPHIL,URINE: NORMAL
EOSINOPHILS ABSOLUTE: 0.1 K/UL (ref 0–0.6)
EOSINOPHILS RELATIVE PERCENT: 0.9 % (ref 0–5)
GFR AFRICAN AMERICAN: 16
GFR NON-AFRICAN AMERICAN: 13
GLUCOSE BLD-MCNC: 185 MG/DL (ref 70–99)
GLUCOSE BLD-MCNC: 242 MG/DL (ref 74–109)
GLUCOSE BLD-MCNC: 250 MG/DL (ref 70–99)
GLUCOSE BLD-MCNC: 254 MG/DL (ref 70–99)
GLUCOSE BLD-MCNC: 259 MG/DL (ref 70–99)
GLUCOSE BLD-MCNC: 269 MG/DL (ref 70–99)
HAV IGM SER IA-ACNC: NORMAL
HBV SURFACE AB TITR SER: NORMAL {TITER}
HCT VFR BLD CALC: 29.2 % (ref 37–47)
HEMOGLOBIN: 9.3 G/DL (ref 12–16)
HEPATITIS B CORE IGM ANTIBODY: NORMAL
HEPATITIS B SURFACE ANTIGEN INTERPRETATION: NORMAL
HEPATITIS C ANTIBODY INTERPRETATION: NORMAL
IMMATURE GRANULOCYTES #: 0.1 K/UL
LYMPHOCYTES ABSOLUTE: 1.3 K/UL (ref 1.1–4.5)
LYMPHOCYTES RELATIVE PERCENT: 12.3 % (ref 20–40)
MCH RBC QN AUTO: 30.9 PG (ref 27–31)
MCHC RBC AUTO-ENTMCNC: 31.8 G/DL (ref 33–37)
MCV RBC AUTO: 97 FL (ref 81–99)
MONOCYTES ABSOLUTE: 0.5 K/UL (ref 0–0.9)
MONOCYTES RELATIVE PERCENT: 4.4 % (ref 0–10)
NEUTROPHILS ABSOLUTE: 8.4 K/UL (ref 1.5–7.5)
NEUTROPHILS RELATIVE PERCENT: 81.1 % (ref 50–65)
OSMOLALITY URINE: 372 MOSM/KG (ref 250–1200)
PDW BLD-RTO: 15.1 % (ref 11.5–14.5)
PERFORMED ON: ABNORMAL
PLATELET # BLD: 204 K/UL (ref 130–400)
PMV BLD AUTO: 10.9 FL (ref 9.4–12.3)
POTASSIUM REFLEX MAGNESIUM: 4.1 MMOL/L (ref 3.5–5)
RBC # BLD: 3.01 M/UL (ref 4.2–5.4)
SODIUM BLD-SCNC: 138 MMOL/L (ref 136–145)
SODIUM URINE: 68 MMOL/L
TOTAL PROTEIN: 6 G/DL (ref 6.6–8.7)
WBC # BLD: 10.4 K/UL (ref 4.8–10.8)

## 2021-08-11 PROCEDURE — 6360000002 HC RX W HCPCS: Performed by: SURGERY

## 2021-08-11 PROCEDURE — 83935 ASSAY OF URINE OSMOLALITY: CPT

## 2021-08-11 PROCEDURE — 02HV33Z INSERTION OF INFUSION DEVICE INTO SUPERIOR VENA CAVA, PERCUTANEOUS APPROACH: ICD-10-PCS | Performed by: SURGERY

## 2021-08-11 PROCEDURE — 8010000000 HC HEMODIALYSIS ACUTE INPT

## 2021-08-11 PROCEDURE — 86850 RBC ANTIBODY SCREEN: CPT

## 2021-08-11 PROCEDURE — 82570 ASSAY OF URINE CREATININE: CPT

## 2021-08-11 PROCEDURE — 6370000000 HC RX 637 (ALT 250 FOR IP): Performed by: NURSE PRACTITIONER

## 2021-08-11 PROCEDURE — 77001 FLUOROGUIDE FOR VEIN DEVICE: CPT | Performed by: SURGERY

## 2021-08-11 PROCEDURE — 2580000003 HC RX 258: Performed by: SURGERY

## 2021-08-11 PROCEDURE — 6360000002 HC RX W HCPCS: Performed by: NURSE PRACTITIONER

## 2021-08-11 PROCEDURE — 3700000001 HC ADD 15 MINUTES (ANESTHESIA): Performed by: SURGERY

## 2021-08-11 PROCEDURE — 2700000000 HC OXYGEN THERAPY PER DAY

## 2021-08-11 PROCEDURE — 5A1D70Z PERFORMANCE OF URINARY FILTRATION, INTERMITTENT, LESS THAN 6 HOURS PER DAY: ICD-10-PCS | Performed by: SURGERY

## 2021-08-11 PROCEDURE — 2580000003 HC RX 258: Performed by: NURSE PRACTITIONER

## 2021-08-11 PROCEDURE — 94640 AIRWAY INHALATION TREATMENT: CPT

## 2021-08-11 PROCEDURE — 3600000013 HC SURGERY LEVEL 3 ADDTL 15MIN: Performed by: SURGERY

## 2021-08-11 PROCEDURE — 7100000000 HC PACU RECOVERY - FIRST 15 MIN: Performed by: SURGERY

## 2021-08-11 PROCEDURE — 3700000000 HC ANESTHESIA ATTENDED CARE: Performed by: SURGERY

## 2021-08-11 PROCEDURE — 82947 ASSAY GLUCOSE BLOOD QUANT: CPT

## 2021-08-11 PROCEDURE — 80074 ACUTE HEPATITIS PANEL: CPT

## 2021-08-11 PROCEDURE — 6370000000 HC RX 637 (ALT 250 FOR IP): Performed by: SURGERY

## 2021-08-11 PROCEDURE — 6360000002 HC RX W HCPCS: Performed by: NURSE ANESTHETIST, CERTIFIED REGISTERED

## 2021-08-11 PROCEDURE — 36558 INSERT TUNNELED CV CATH: CPT | Performed by: SURGERY

## 2021-08-11 PROCEDURE — 85025 COMPLETE CBC W/AUTO DIFF WBC: CPT

## 2021-08-11 PROCEDURE — 2580000003 HC RX 258: Performed by: ANESTHESIOLOGY

## 2021-08-11 PROCEDURE — 84300 ASSAY OF URINE SODIUM: CPT

## 2021-08-11 PROCEDURE — 2709999900 HC NON-CHARGEABLE SUPPLY: Performed by: SURGERY

## 2021-08-11 PROCEDURE — C1750 CATH, HEMODIALYSIS,LONG-TERM: HCPCS | Performed by: SURGERY

## 2021-08-11 PROCEDURE — 0JH63XZ INSERTION OF TUNNELED VASCULAR ACCESS DEVICE INTO CHEST SUBCUTANEOUS TISSUE AND FASCIA, PERCUTANEOUS APPROACH: ICD-10-PCS | Performed by: SURGERY

## 2021-08-11 PROCEDURE — 2500000003 HC RX 250 WO HCPCS: Performed by: NURSE ANESTHETIST, CERTIFIED REGISTERED

## 2021-08-11 PROCEDURE — 3600000003 HC SURGERY LEVEL 3 BASE: Performed by: SURGERY

## 2021-08-11 PROCEDURE — 1210000000 HC MED SURG R&B

## 2021-08-11 PROCEDURE — 86706 HEP B SURFACE ANTIBODY: CPT

## 2021-08-11 PROCEDURE — 86900 BLOOD TYPING SEROLOGIC ABO: CPT

## 2021-08-11 PROCEDURE — 80053 COMPREHEN METABOLIC PANEL: CPT

## 2021-08-11 PROCEDURE — 36415 COLL VENOUS BLD VENIPUNCTURE: CPT

## 2021-08-11 PROCEDURE — 7100000001 HC PACU RECOVERY - ADDTL 15 MIN: Performed by: SURGERY

## 2021-08-11 PROCEDURE — 87205 SMEAR GRAM STAIN: CPT

## 2021-08-11 PROCEDURE — 99222 1ST HOSP IP/OBS MODERATE 55: CPT | Performed by: NURSE PRACTITIONER

## 2021-08-11 PROCEDURE — 86901 BLOOD TYPING SEROLOGIC RH(D): CPT

## 2021-08-11 RX ORDER — METOCLOPRAMIDE HYDROCHLORIDE 5 MG/ML
10 INJECTION INTRAMUSCULAR; INTRAVENOUS
Status: DISCONTINUED | OUTPATIENT
Start: 2021-08-11 | End: 2021-08-11 | Stop reason: HOSPADM

## 2021-08-11 RX ORDER — FENTANYL CITRATE 50 UG/ML
50 INJECTION, SOLUTION INTRAMUSCULAR; INTRAVENOUS
Status: DISCONTINUED | OUTPATIENT
Start: 2021-08-11 | End: 2021-08-11 | Stop reason: HOSPADM

## 2021-08-11 RX ORDER — SODIUM CHLORIDE 0.9 % (FLUSH) 0.9 %
5-40 SYRINGE (ML) INJECTION EVERY 12 HOURS SCHEDULED
Status: DISCONTINUED | OUTPATIENT
Start: 2021-08-11 | End: 2021-08-11 | Stop reason: HOSPADM

## 2021-08-11 RX ORDER — PROMETHAZINE HYDROCHLORIDE 25 MG/ML
6.25 INJECTION, SOLUTION INTRAMUSCULAR; INTRAVENOUS
Status: DISCONTINUED | OUTPATIENT
Start: 2021-08-11 | End: 2021-08-11 | Stop reason: HOSPADM

## 2021-08-11 RX ORDER — HYDROCODONE BITARTRATE AND ACETAMINOPHEN 5; 325 MG/1; MG/1
2 TABLET ORAL EVERY 4 HOURS PRN
Status: DISCONTINUED | OUTPATIENT
Start: 2021-08-11 | End: 2021-08-13 | Stop reason: HOSPADM

## 2021-08-11 RX ORDER — ENALAPRILAT 2.5 MG/2ML
1.25 INJECTION INTRAVENOUS
Status: DISCONTINUED | OUTPATIENT
Start: 2021-08-11 | End: 2021-08-11 | Stop reason: HOSPADM

## 2021-08-11 RX ORDER — SODIUM CHLORIDE 450 MG/100ML
INJECTION, SOLUTION INTRAVENOUS CONTINUOUS
Status: DISCONTINUED | OUTPATIENT
Start: 2021-08-11 | End: 2021-08-13 | Stop reason: HOSPADM

## 2021-08-11 RX ORDER — SODIUM CHLORIDE 9 MG/ML
INJECTION, SOLUTION INTRAVENOUS CONTINUOUS
Status: DISCONTINUED | OUTPATIENT
Start: 2021-08-11 | End: 2021-08-13 | Stop reason: HOSPADM

## 2021-08-11 RX ORDER — MORPHINE SULFATE 4 MG/ML
4 INJECTION, SOLUTION INTRAMUSCULAR; INTRAVENOUS
Status: DISCONTINUED | OUTPATIENT
Start: 2021-08-11 | End: 2021-08-11 | Stop reason: HOSPADM

## 2021-08-11 RX ORDER — DIPHENHYDRAMINE HYDROCHLORIDE 50 MG/ML
12.5 INJECTION INTRAMUSCULAR; INTRAVENOUS
Status: DISCONTINUED | OUTPATIENT
Start: 2021-08-11 | End: 2021-08-11 | Stop reason: HOSPADM

## 2021-08-11 RX ORDER — MORPHINE SULFATE 4 MG/ML
4 INJECTION, SOLUTION INTRAMUSCULAR; INTRAVENOUS EVERY 5 MIN PRN
Status: DISCONTINUED | OUTPATIENT
Start: 2021-08-11 | End: 2021-08-11 | Stop reason: HOSPADM

## 2021-08-11 RX ORDER — HYDROMORPHONE HYDROCHLORIDE 1 MG/ML
0.25 INJECTION, SOLUTION INTRAMUSCULAR; INTRAVENOUS; SUBCUTANEOUS EVERY 5 MIN PRN
Status: DISCONTINUED | OUTPATIENT
Start: 2021-08-11 | End: 2021-08-11 | Stop reason: HOSPADM

## 2021-08-11 RX ORDER — HYDRALAZINE HYDROCHLORIDE 20 MG/ML
5 INJECTION INTRAMUSCULAR; INTRAVENOUS EVERY 10 MIN PRN
Status: DISCONTINUED | OUTPATIENT
Start: 2021-08-11 | End: 2021-08-11 | Stop reason: HOSPADM

## 2021-08-11 RX ORDER — ONDANSETRON 2 MG/ML
4 INJECTION INTRAMUSCULAR; INTRAVENOUS EVERY 8 HOURS PRN
Status: DISCONTINUED | OUTPATIENT
Start: 2021-08-11 | End: 2021-08-13 | Stop reason: HOSPADM

## 2021-08-11 RX ORDER — FENTANYL CITRATE 50 UG/ML
INJECTION, SOLUTION INTRAMUSCULAR; INTRAVENOUS PRN
Status: DISCONTINUED | OUTPATIENT
Start: 2021-08-11 | End: 2021-08-11 | Stop reason: SDUPTHER

## 2021-08-11 RX ORDER — MEPERIDINE HYDROCHLORIDE 25 MG/ML
12.5 INJECTION INTRAMUSCULAR; INTRAVENOUS; SUBCUTANEOUS EVERY 5 MIN PRN
Status: DISCONTINUED | OUTPATIENT
Start: 2021-08-11 | End: 2021-08-11 | Stop reason: HOSPADM

## 2021-08-11 RX ORDER — SODIUM CHLORIDE 0.9 % (FLUSH) 0.9 %
5-40 SYRINGE (ML) INJECTION PRN
Status: DISCONTINUED | OUTPATIENT
Start: 2021-08-11 | End: 2021-08-11 | Stop reason: HOSPADM

## 2021-08-11 RX ORDER — MORPHINE SULFATE 4 MG/ML
2 INJECTION, SOLUTION INTRAMUSCULAR; INTRAVENOUS EVERY 5 MIN PRN
Status: DISCONTINUED | OUTPATIENT
Start: 2021-08-11 | End: 2021-08-11 | Stop reason: HOSPADM

## 2021-08-11 RX ORDER — CHOLECALCIFEROL (VITAMIN D3) 10 MCG
1 TABLET ORAL DAILY
Status: DISCONTINUED | OUTPATIENT
Start: 2021-08-11 | End: 2021-08-13 | Stop reason: HOSPADM

## 2021-08-11 RX ORDER — LABETALOL HYDROCHLORIDE 5 MG/ML
5 INJECTION, SOLUTION INTRAVENOUS EVERY 10 MIN PRN
Status: DISCONTINUED | OUTPATIENT
Start: 2021-08-11 | End: 2021-08-11 | Stop reason: HOSPADM

## 2021-08-11 RX ORDER — SODIUM CHLORIDE, SODIUM LACTATE, POTASSIUM CHLORIDE, CALCIUM CHLORIDE 600; 310; 30; 20 MG/100ML; MG/100ML; MG/100ML; MG/100ML
INJECTION, SOLUTION INTRAVENOUS CONTINUOUS
Status: DISCONTINUED | OUTPATIENT
Start: 2021-08-11 | End: 2021-08-11

## 2021-08-11 RX ORDER — SODIUM CHLORIDE 9 MG/ML
25 INJECTION, SOLUTION INTRAVENOUS PRN
Status: DISCONTINUED | OUTPATIENT
Start: 2021-08-11 | End: 2021-08-11 | Stop reason: HOSPADM

## 2021-08-11 RX ORDER — HYDROMORPHONE HYDROCHLORIDE 1 MG/ML
0.5 INJECTION, SOLUTION INTRAMUSCULAR; INTRAVENOUS; SUBCUTANEOUS EVERY 5 MIN PRN
Status: DISCONTINUED | OUTPATIENT
Start: 2021-08-11 | End: 2021-08-11 | Stop reason: HOSPADM

## 2021-08-11 RX ORDER — LIDOCAINE HYDROCHLORIDE 10 MG/ML
INJECTION, SOLUTION INFILTRATION; PERINEURAL PRN
Status: DISCONTINUED | OUTPATIENT
Start: 2021-08-11 | End: 2021-08-11 | Stop reason: SDUPTHER

## 2021-08-11 RX ORDER — SCOLOPAMINE TRANSDERMAL SYSTEM 1 MG/1
1 PATCH, EXTENDED RELEASE TRANSDERMAL ONCE
Status: DISCONTINUED | OUTPATIENT
Start: 2021-08-11 | End: 2021-08-11 | Stop reason: HOSPADM

## 2021-08-11 RX ORDER — MIDAZOLAM HYDROCHLORIDE 1 MG/ML
2 INJECTION INTRAMUSCULAR; INTRAVENOUS
Status: DISCONTINUED | OUTPATIENT
Start: 2021-08-11 | End: 2021-08-11 | Stop reason: HOSPADM

## 2021-08-11 RX ORDER — ACETAMINOPHEN 325 MG/1
650 TABLET ORAL EVERY 4 HOURS PRN
Status: DISCONTINUED | OUTPATIENT
Start: 2021-08-11 | End: 2021-08-13 | Stop reason: HOSPADM

## 2021-08-11 RX ORDER — HYDROCODONE BITARTRATE AND ACETAMINOPHEN 5; 325 MG/1; MG/1
1 TABLET ORAL EVERY 4 HOURS PRN
Status: DISCONTINUED | OUTPATIENT
Start: 2021-08-11 | End: 2021-08-13 | Stop reason: HOSPADM

## 2021-08-11 RX ORDER — LIDOCAINE HYDROCHLORIDE 10 MG/ML
1 INJECTION, SOLUTION EPIDURAL; INFILTRATION; INTRACAUDAL; PERINEURAL
Status: DISCONTINUED | OUTPATIENT
Start: 2021-08-11 | End: 2021-08-11 | Stop reason: HOSPADM

## 2021-08-11 RX ORDER — PROPOFOL 10 MG/ML
INJECTION, EMULSION INTRAVENOUS PRN
Status: DISCONTINUED | OUTPATIENT
Start: 2021-08-11 | End: 2021-08-11 | Stop reason: SDUPTHER

## 2021-08-11 RX ADMIN — SODIUM BICARBONATE 1300 MG: 650 TABLET ORAL at 09:11

## 2021-08-11 RX ADMIN — HEPARIN SODIUM 5000 UNITS: 5000 INJECTION INTRAVENOUS; SUBCUTANEOUS at 05:28

## 2021-08-11 RX ADMIN — Medication 10 ML: at 05:27

## 2021-08-11 RX ADMIN — SODIUM CHLORIDE: 4.5 INJECTION, SOLUTION INTRAVENOUS at 11:17

## 2021-08-11 RX ADMIN — EPOETIN ALFA-EPBX 3000 UNITS: 3000 INJECTION, SOLUTION INTRAVENOUS; SUBCUTANEOUS at 17:25

## 2021-08-11 RX ADMIN — INSULIN GLARGINE 25 UNITS: 100 INJECTION, SOLUTION SUBCUTANEOUS at 21:04

## 2021-08-11 RX ADMIN — IPRATROPIUM BROMIDE AND ALBUTEROL SULFATE 1 AMPULE: .5; 3 SOLUTION RESPIRATORY (INHALATION) at 19:25

## 2021-08-11 RX ADMIN — CITALOPRAM HYDROBROMIDE 40 MG: 20 TABLET ORAL at 09:11

## 2021-08-11 RX ADMIN — CHLORHEXIDINE GLUCONATE: 4 LIQUID TOPICAL at 10:24

## 2021-08-11 RX ADMIN — FENTANYL CITRATE 50 MCG: 50 INJECTION, SOLUTION INTRAMUSCULAR; INTRAVENOUS at 11:41

## 2021-08-11 RX ADMIN — SODIUM BICARBONATE 1300 MG: 650 TABLET ORAL at 21:03

## 2021-08-11 RX ADMIN — ROSUVASTATIN 20 MG: 20 TABLET, FILM COATED ORAL at 09:10

## 2021-08-11 RX ADMIN — PROPOFOL 150 MG: 10 INJECTION, EMULSION INTRAVENOUS at 11:44

## 2021-08-11 RX ADMIN — NEPHROCAP 1 MG: 1 CAP ORAL at 17:25

## 2021-08-11 RX ADMIN — INSULIN LISPRO 3 UNITS: 100 INJECTION, SOLUTION INTRAVENOUS; SUBCUTANEOUS at 21:03

## 2021-08-11 RX ADMIN — ALLOPURINOL 100 MG: 100 TABLET ORAL at 09:11

## 2021-08-11 RX ADMIN — IPRATROPIUM BROMIDE AND ALBUTEROL SULFATE 1 AMPULE: .5; 3 SOLUTION RESPIRATORY (INHALATION) at 06:35

## 2021-08-11 RX ADMIN — Medication 10 ML: at 09:11

## 2021-08-11 RX ADMIN — CARVEDILOL 12.5 MG: 12.5 TABLET, FILM COATED ORAL at 09:11

## 2021-08-11 RX ADMIN — AMLODIPINE BESYLATE 10 MG: 5 TABLET ORAL at 09:10

## 2021-08-11 RX ADMIN — HYDROCODONE BITARTRATE AND ACETAMINOPHEN 2 TABLET: 5; 325 TABLET ORAL at 18:22

## 2021-08-11 RX ADMIN — SODIUM BICARBONATE 1300 MG: 650 TABLET ORAL at 17:25

## 2021-08-11 RX ADMIN — LAMOTRIGINE 100 MG: 100 TABLET ORAL at 09:11

## 2021-08-11 RX ADMIN — HEPARIN SODIUM 5000 UNITS: 5000 INJECTION INTRAVENOUS; SUBCUTANEOUS at 17:24

## 2021-08-11 RX ADMIN — LIDOCAINE HYDROCHLORIDE 50 MG: 10 INJECTION, SOLUTION INFILTRATION; PERINEURAL at 11:44

## 2021-08-11 RX ADMIN — ASPIRIN 81 MG: 81 TABLET, CHEWABLE ORAL at 09:11

## 2021-08-11 RX ADMIN — CARVEDILOL 12.5 MG: 12.5 TABLET, FILM COATED ORAL at 21:04

## 2021-08-11 RX ADMIN — HEPARIN SODIUM 5000 UNITS: 5000 INJECTION INTRAVENOUS; SUBCUTANEOUS at 23:31

## 2021-08-11 RX ADMIN — ALLOPURINOL 100 MG: 100 TABLET ORAL at 21:04

## 2021-08-11 RX ADMIN — WATER 1000 MG: 1 INJECTION INTRAMUSCULAR; INTRAVENOUS; SUBCUTANEOUS at 11:50

## 2021-08-11 ASSESSMENT — PAIN SCALES - GENERAL
PAINLEVEL_OUTOF10: 6
PAINLEVEL_OUTOF10: 0

## 2021-08-11 ASSESSMENT — ENCOUNTER SYMPTOMS
TROUBLE SWALLOWING: 0
DIARRHEA: 0
SHORTNESS OF BREATH: 1
ABDOMINAL PAIN: 0
CONSTIPATION: 0
WHEEZING: 0
SHORTNESS OF BREATH: 0
NAUSEA: 0
COUGH: 0
BLOOD IN STOOL: 0
VOMITING: 0
SORE THROAT: 0
ABDOMINAL DISTENTION: 0
SINUS PAIN: 0

## 2021-08-11 ASSESSMENT — LIFESTYLE VARIABLES: SMOKING_STATUS: 0

## 2021-08-11 NOTE — PROGRESS NOTES
4 Eyes Skin Assessment    [facility] is being assessed upon: Admission    I agree that Isaiah Jewell RN, along with Penny Orr LPN (either 2 RN's or 1 LPN and 1 RN) have performed a thorough Head to Toe Skin Assessment on the patient. ALL assessment sites listed below have been assessed. Areas assessed by both nurses:     [x]   Head, Face, and Ears   [x]   Shoulders, Back, and Chest  [x]   Arms, Elbows, and Hands   [x]   Coccyx, Sacrum, and Ischium  [x]   Legs, Feet, and Heels    Does the Patient have Skin Breakdown?  No    Karel Prevention initiated: Yes  Wound Care Orders initiated: No    WOC nurse consulted for Pressure Injury (Stage 3,4, Unstageable, DTI, NWPT, and Complex wounds) and New or Established Ostomies: No        Primary Nurse eSignature: Andrea Fajardo RN on 8/11/2021 at 3:37 AM      Co-Signer eSignature: Electronically signed by Devi Callaway LPN on 1/32/68 at 7:17 AM CDT

## 2021-08-11 NOTE — PROGRESS NOTES
[facility]      Progress Note    Patient:  Angie Akins  YOB: 1960  Date of Service: 8/11/2021  MRN: [id number]   Acct: [de-identified]   Primary Care Physician: Solomon Crowe MD  Advance Directive: Full Code  Admit Date: 8/10/2021       Hospital Day: 1    Portions of this note have been copied forward, however, updated to reflect the most current clinical status of this patient. CHIEF COMPLAINT SOB/BENAVIDEZ    SUBJECTIVE:  Ms. Eura Hammans was seen in dialysis this afternoon. Reports some improvement in her shortness of breath. Denies chest pain at this time. Reports neck pain and unable to get comfortable in bed. CUMULATIVE HOSPITAL COURSE:   The patient is a 64 y.o. female with past medical history of CKD 4, HTN, HLD, DM, sleep apnea uses CPAP at bedtime, thyroid disease, and anxiety who presented to [facility] ED complaining of shortness of breath. Ms. Eura Hammans reported worsening shortness of breath since last 3 days. Reported shortness of breath with activity since July 2021. Reported decreased O2 sat with activity. Denied home oxygen use. Stated she uses CPAP at bedtime. Reported chest discomfort from labored breathing, and nonproductive cough at times. Work-up in ED revealed hypoxia on arrival on room air, hypertension with systolic over 412, creatinine 3.5, GFR 13 (creatinine 3.1, GFR 15 on 7/27/2021), BNP 2288, Hgb 9.7. Chest x-ray showed congestive failure with interstitial and alveolar edema. Patient was admitted to hospital medicine with acute hypoxic respiratory failure with acute hemodialysis encounter. Nephrology was consulted who recommended permacath and dialysis afterwards. Vascular surgery placed permacath today and patient received dialysis afterwards.         Review of Systems   Constitutional: Negative for chills, diaphoresis, fatigue and fever. HENT: Negative for congestion, ear pain, sinus pain, sore throat and trouble swallowing. Eyes: Negative for visual disturbance. Respiratory: Positive for shortness of breath. Negative for cough and wheezing. Reports shortness of breath has been improving. Cardiovascular: Positive for leg swelling. Negative for chest pain and palpitations. Chronic lymphedema   Gastrointestinal: Negative for abdominal distention, abdominal pain, blood in stool, constipation, diarrhea, nausea and vomiting. Endocrine: Negative for cold intolerance and heat intolerance. Genitourinary: Negative for difficulty urinating, flank pain, frequency and urgency. Musculoskeletal: Positive for neck pain. Negative for arthralgias and myalgias. Neurological: Negative for dizziness, syncope, weakness, light-headedness, numbness and headaches. Hematological: Does not bruise/bleed easily. Psychiatric/Behavioral: Negative for agitation, confusion and dysphoric mood. Objective:   VITALS:  BP (!) 160/73   Pulse 69   Temp 98.9 °F (37.2 °C) (Temporal)   Resp 19   Ht 5' 5\" (1.651 m)   Wt (!) 389 lb 9 oz (176.7 kg)   LMP 01/01/2000   SpO2 91%   BMI 64.83 kg/m²   24HR INTAKE/OUTPUT:    Intake/Output Summary (Last 24 hours) at 8/11/2021 1439  Last data filed at 8/11/2021 1251  Gross per 24 hour   Intake 700 ml   Output 1600 ml   Net -900 ml           Physical Exam  Constitutional:       General: She is not in acute distress. Appearance: Normal appearance. She is not toxic-appearing or diaphoretic. HENT:      Head: Normocephalic and atraumatic. Right Ear: External ear normal.      Left Ear: External ear normal.      Nose: Nose normal. No congestion or rhinorrhea. Mouth/Throat:      Mouth: Mucous membranes are moist.      Pharynx: Oropharynx is clear. Eyes:      General: No scleral icterus. Extraocular Movements: Extraocular movements intact. Conjunctiva/sclera: Conjunctivae normal.   Neck:      Comments: Right permacath in place, C/D/I   Cardiovascular:      Rate and Rhythm: Normal rate and regular rhythm. Pulses: Normal pulses. Heart sounds: Normal heart sounds. No murmur heard. No friction rub. No gallop. Pulmonary:      Effort: Pulmonary effort is normal. No respiratory distress. Breath sounds: No wheezing, rhonchi or rales. Comments: Diminished breath sounds bilaterally  Abdominal:      General: Abdomen is flat. Bowel sounds are normal. There is no distension. Palpations: Abdomen is soft. Tenderness: There is no abdominal tenderness. Musculoskeletal:         General: No swelling. Normal range of motion. Cervical back: Normal range of motion and neck supple. Right lower leg: Edema present. Left lower leg: Edema present. Comments: Chronic lymphedema   Skin:     General: Skin is warm and dry. Coloration: Skin is not jaundiced. Findings: No erythema, lesion or rash. Neurological:      General: No focal deficit present. Mental Status: She is alert and oriented to person, place, and time. Mental status is at baseline. Cranial Nerves: No cranial nerve deficit. Sensory: No sensory deficit. Motor: No weakness. Psychiatric:         Mood and Affect: Mood normal.         Behavior: Behavior normal.         Thought Content:  Thought content normal.         Judgment: Judgment normal.            Medications:      sodium chloride 100 mL/hr at 08/11/21 1117    sodium chloride      sodium chloride      dextrose        b complex-C-folic acid  1 capsule Oral Daily    epoetin jose ramon-epbx  3,000 Units Subcutaneous Once per day on Mon Wed Fri    sodium chloride flush  5-40 mL Intravenous 2 times per day    heparin (porcine)  5,000 Units Subcutaneous 3 times per day    allopurinol  100 mg Oral BID    amLODIPine  10 mg Oral Daily    aspirin  81 mg Oral Daily    carvedilol  12.5 mg Oral BID    citalopram  40 mg Oral Daily    lamoTRIgine  100 mg Oral Daily    levothyroxine  88 mcg Oral Daily    sodium bicarbonate  1,300 mg Oral TID   [facility] Problems:    * No resolved hospital problems. *      Principal Problem:    Acute hypoxemic respiratory failure ([facility] [address]-               - Continue Bronchodilators               - Supplemental oxygen as needed               - Incentive spirometry               - Encourage deep breathing and cough         Active Problems:    Stage 4 chronic kidney disease (HCC)/ Acute hemodialysis encounter [facility])-               - Nephrology following     - Recommended permacath and dialysis afterwards     - Received 1st HD treatment today               - Creatinine 3.5 today               - Monitor I's and O's closely              - Monitor labs closely              - Avoid hypotension              - Avoid nephrotoxic agents       HTN (hypertension)- noted, elevated at times, continue current medications, as needed IV hydralazine        DM (diabetes mellitus) ([facility] [address]- Lantus, SSI, Accu-Chek, hypoglycemia treatment protocol in place       Thyroid disease- noted, continue home medication        DVT Prophylaxis: Heparin SubQ       Further Orders per Clinical course/attending. Electronically signed by JAMES Mendez CNP on 8/11/2021 at 2:39 PM       EMR Dragon/Transcription disclaimer:   Much of this encounter note is an electronic transcription/translation of spoken language to printed text.  The electronic translation of spoken language may permit erroneous, or at times, nonsensical words or phrases to be inadvertently transcribed; although attempts have made to review the note for such errors, some may still exist.

## 2021-08-11 NOTE — H&P
[facility]      Hospitalist - History & Physical      PCP: Eduardo Manuel MD    Date of Admission: 8/10/2021    Date of Service: 8/10/2021    Chief Complaint: Shortness of breath    History Of Present Illness: The patient is a 64 y.o. female with past medical history of CKD 4, HTN, HLD, DM, sleep apnea uses CPAP at bedtime, thyroid disease, and anxiety who presented to [facility] ED complaining of shortness of breath. Ms. Kayleen Adkins reports worsening shortness of breath since last 3 days. Reports shortness of breath with activity since July 2021. Reports decreased O2 sat with activity. Denies home oxygen use. States he uses CPAP at bedtime. Reports chest discomfort from laboring breathing, and nonproductive cough at times. Denies fever, chills, nausea, vomiting, or diarrhea. Work-up in ED revealed hypoxia on arrival on room air, hypertension with systolic over 649, creatinine 3.5, GFR 13 (creatinine 3.1, GFR 15 on 7/27/2021), BNP 2288, Hgb 9.7. Chest x-ray showed congestive failure with interstitial and alveolar edema.         Past Medical History:        Diagnosis Date    Anxiety     Arthritis     CKD (chronic kidney disease)     stage 4    Colon polyp     Depression     Embolism - blood clot 2004    Hx of blood clots     Hyperlipidemia     Hypertension     Obesity     Sleep apnea     Thyroid disease     Type II or unspecified type diabetes mellitus without mention of complication, not stated as uncontrolled        Past Surgical History:        Procedure Laterality Date    CHOLECYSTECTOMY      DIALYSIS FISTULA CREATION Left 06/18/2021    LEFT BRACHIAL-CEPHALIC AV FISTULA CREATION performed by Kacie Diaz MD at [facility] Left 7/27/2021    LEFT UPPER EXTREMITY CEPHALIC VEIN SUPERFICIALIZATION performed by Kacie Diaz MD at [facility] 21  10/17/2018    POLYPECTOMY      RECTAL SURGERY      fistula repair   [place]on Sicard TONSILLECTOMY AND ADENOIDECTOMY      VASCULAR SURGERY  07/12/2021    [facility]- Ultrasound-guided cannulation left distal upper arm cephalic vein with 4 French glide sheath, Left upper extremity fistulogram's including venography the superior vena cava    VASCULAR SURGERY  07/27/2021    [facility]- Superficialization of the left upper arm cephalic vein arteriovenous fistula       Home Medications:  Prior to Admission medications    Medication Sig Start Date End Date Taking? Authorizing Provider   amLODIPine (NORVASC) 10 MG tablet Take 10 mg by mouth daily  5/24/21   Historical Provider, MD   carvedilol (COREG) 12.5 MG tablet Take 12.5 mg by mouth 2 times daily  6/14/21   Historical Provider, MD   TRULICITY 3 KW/7.6TN SOPN 3 mg once a week  5/31/21   Historical Provider, MD   levothyroxine (SYNTHROID) 88 MCG tablet Take 88 mcg by mouth Daily  5/27/21   Historical Provider, MD   rosuvastatin (CRESTOR) 20 MG tablet Take 20 mg by mouth daily  5/31/21   Historical Provider, MD   sodium bicarbonate 650 MG tablet Take 1,300 mg by mouth 3 times daily    Historical Provider, MD   Potassium 99 MG TABS Take 1 tablet by mouth nightly    Historical Provider, MD   dexamethasone (DECADRON) 1 MG tablet Take 1 mg by mouth daily (with breakfast)    Historical Provider, MD   lisinopril (PRINIVIL;ZESTRIL) 40 MG tablet Take 40 mg by mouth 2 times daily Indications: High Blood Pressure Disorder    Historical Provider, MD   cetirizine (ZYRTEC) 10 MG tablet Take 10 mg by mouth as needed for Allergies     Historical Provider, MD   diazePAM (VALIUM) 2 MG tablet Take 2 mg by mouth as needed for Anxiety.      Historical Provider, MD   Evolocumab 140 MG/ML SOAJ Inject 140 mg into the skin every 14 days 10/21/20 9/9/21  Historical Provider, MD   fluticasone (FLONASE) 50 MCG/ACT nasal spray 2 sprays by Nasal route daily as needed for Rhinitis  3/22/21   Historical Provider, MD   insulin lispro (HUMALOG KWIKPEN) 200 UNIT/ML SOPN pen Inject 25-80 Units into the skin 3 times daily (with meals)  10/21/20   Historical Provider, MD   Insulin Pen Needle (B-D ULTRAFINE III SHORT PEN) 31G X 8 MM MISC USE AS DIRECTED TO INJECT FOUR TIMES DAILY 10/21/20   Historical Provider, MD   Insulin Syringe-Needle U-100 (BD INSULIN SYRINGE U/F) 31G X 5/16\" 1 ML MISC AS DIRECTED FOUR TIMES A DAY 10/27/20   Historical Provider, MD   FreeStyle Lancets MISC Use as instructed 4 x daily 10/21/20   Historical Provider, MD   Omega-3 Fatty Acids (FISH OIL) 1000 MG CAPS Take 1,000 mg by mouth Daily with supper     Historical Provider, MD   epoetin jose ramon (EPOGEN;PROCRIT) 31767 UNIT/ML injection Inject 10,000 Units into the skin every 30 days     Historical Provider, MD   lamoTRIgine (LAMICTAL) 100 MG tablet Take 100 mg by mouth daily Indications: 1/2 tablet with dinner    Historical Provider, MD   allopurinol (ZYLOPRIM) 100 MG tablet Take 100 mg by mouth 2 times daily    Historical Provider, MD   Ergocalciferol (VITAMIN D2 PO) Take 50,000 Units by mouth three times a week     Historical Provider, MD   citalopram (CELEXA) 40 MG tablet Take 40 mg by mouth daily    Historical Provider, MD   busPIRone (BUSPAR) 10 MG tablet Take 20 mg by mouth 2 times daily    Historical Provider, MD   aspirin 81 MG tablet Take 81 mg by mouth daily With Dinner    Historical Provider, MD   Insulin Glargine (LANTUS SC) Inject 90 Units into the skin daily With Evening Meals    Historical Provider, MD       Allergies:    Codeine    Social History:    The patient currently lives at home  Tobacco:   reports that she has never smoked. She has never used smokeless tobacco.  Alcohol:   reports no history of alcohol use.   Illicit Drugs: denies    Family History:      Problem Relation Age of Onset    Lung Cancer Mother     Brain Cancer Mother     Heart Attack Father     Prostate Cancer Father     Heart Disease Father     Stroke Father     Uterine Cancer Maternal Grandmother     Cervical Cancer Maternal Grandmother     Diabetes Maternal Grandfather     Other Maternal Grandfather         histoplasmosis    Diabetes Paternal Grandfather            Review of Systems   Constitutional: Negative for chills, diaphoresis, fatigue and fever. HENT: Negative for congestion, ear pain, sinus pain, sore throat and trouble swallowing. Eyes: Negative for visual disturbance. Respiratory: Positive for cough and shortness of breath. Negative for wheezing. Reports SOB with exertion and orthopnea     Cardiovascular: Positive for leg swelling. Negative for chest pain and palpitations. Gastrointestinal: Negative for abdominal distention, abdominal pain, blood in stool, constipation, diarrhea, nausea and vomiting. Endocrine: Negative for cold intolerance and heat intolerance. Genitourinary: Negative for difficulty urinating, flank pain, frequency and urgency. Musculoskeletal: Negative for arthralgias and myalgias. Neurological: Negative for dizziness, syncope, weakness, light-headedness, numbness and headaches. Hematological: Does not bruise/bleed easily. Psychiatric/Behavioral: Negative for agitation, confusion and dysphoric mood. All other systems reviewed and are negative. Physical Examination:  BP (!) 181/89   Pulse 80   Temp 97.2 °F (36.2 °C) (Temporal)   Resp 20   Ht 5' 5\" (1.651 m)   Wt (!) 383 lb (173.7 kg)   LMP 01/01/2000   SpO2 98%   BMI 63.73 kg/m²     Physical Exam  Constitutional:       General: She is not in acute distress. Appearance: Normal appearance. She is obese. She is not toxic-appearing or diaphoretic. HENT:      Head: Normocephalic and atraumatic. Right Ear: External ear normal.      Left Ear: External ear normal.      Nose: Nose normal. No congestion or rhinorrhea. Mouth/Throat:      Mouth: Mucous membranes are moist.      Pharynx: Oropharynx is clear. Eyes:      General: No scleral icterus. Extraocular Movements: Extraocular movements intact. Conjunctiva/sclera: Conjunctivae normal.   Cardiovascular:      Rate and Rhythm: Normal rate and regular rhythm. Pulses: Normal pulses. Heart sounds: Normal heart sounds. No murmur heard. No friction rub. No gallop. Pulmonary:      Effort: Pulmonary effort is normal. No respiratory distress. Breath sounds: No wheezing, rhonchi or rales. Comments: Diminished breath sounds bilaterally  Abdominal:      General: Abdomen is flat. Bowel sounds are normal. There is no distension. Palpations: Abdomen is soft. Tenderness: There is no abdominal tenderness. Musculoskeletal:         General: No swelling. Normal range of motion. Cervical back: Normal range of motion and neck supple. Right lower leg: Edema present. Left lower leg: Edema present. Comments: +2 BLE edema noted   Skin:     General: Skin is warm and dry. Coloration: Skin is not jaundiced. Findings: No erythema, lesion or rash. Neurological:      General: No focal deficit present. Mental Status: She is alert and oriented to person, place, and time. Mental status is at baseline. Cranial Nerves: No cranial nerve deficit. Sensory: No sensory deficit. Motor: No weakness. Psychiatric:         Mood and Affect: Mood normal.         Behavior: Behavior normal.         Thought Content:  Thought content normal.         Judgment: Judgment normal.          Diagnostic Data:  CBC:  Recent Labs     08/10/21  1639   WBC 10.6   HGB 9.7*   HCT 30.8*        BMP:  Recent Labs     08/10/21  1721      K 3.9      CO2 23   BUN 30*   CREATININE 3.5*   CALCIUM 8.4*     Recent Labs     08/10/21  1721   AST <5   ALT 6   BILITOT 0.3   ALKPHOS 93     Cardiac Enzymes:   Recent Labs     08/10/21  1721   TROPONINI <0.01     Urinalysis:  Lab Results   Component Value Date    NITRU Negative 08/10/2021    WBCUA 6 08/10/2021    BACTERIA NEGATIVE 08/10/2021    RBCUA 1 08/10/2021    RBCUA 1 09/16/2015 BLOODU TRACE 08/10/2021    SPECGRAV 1.016 08/10/2021    GLUCOSEU 500 08/10/2021         RAD:    XR CHEST PORTABLE  Result Date: 8/10/2021    1. . Congestive failure with interstitial and alveolar edema. Signed by Dr Annia Taylor      Assessment/Plan:  Principal Problem:    Acute hypoxemic respiratory failure ([facility]  Active Problems:    Thyroid disease    Stage 4 chronic kidney disease ([facility]    Acute hemodialysis encounter ([facility]    DM (diabetes mellitus) ([facility]    HTN (hypertension)  Resolved Problems:    * No resolved hospital problems. *       Principal Problem:    Acute hypoxemic respiratory failure ([facility]-               - Bronchodilators               - Supplemental oxygen as needed               - Incentive spirometry               - Encourage deep breathing and cough       Active Problems:    Stage 4 chronic kidney disease (HCC)/ Acute hemodialysis encounter [facility])-    - Nephrology consultation and recommendations appreciated              - Creatinine 3.5 today               - Monitor I's and O's closely              - Monitor labs closely              - Avoid hypotension              - Avoid nephrotoxic agents       HTN (hypertension)- noted, resume home medications, as needed IV hydralazine       DM (diabetes mellitus) ([facility]- Lantus, SSI, Accu-Chek, hypoglycemia treatment protocol in place      Thyroid disease- noted, resume home medication         DVT Prophylaxis:  Heparin SubQ    Further Orders per Clinical course/attending. Signed:  Electronically signed by JAMES Gonzalez CNP on 8/10/21 at 8:37 PM CDT       EMR Dragon/Transcription disclaimer:   Much of this encounter note is an electronic transcription/translation of spoken language to printed text.  The electronic translation of spoken language may permit erroneous, or at times, nonsensical words or phrases to be inadvertently transcribed; although attempts have made to review the note for such errors, some may still exist.

## 2021-08-11 NOTE — PLAN OF CARE
Problem: Falls - Risk of:  Goal: Will remain free from falls  Description: Will remain free from falls  8/11/2021 0212 by Sandrine Her RN  Outcome: Ongoing  8/11/2021 0211 by Sandrine Her RN  Outcome: Ongoing  Goal: Absence of physical injury  Description: Absence of physical injury  8/11/2021 0212 by Sandrine Her RN  Outcome: Ongoing  8/11/2021 0211 by Sandrine Her RN  Outcome: Ongoing     Problem: Discharge Planning:  Goal: Discharged to appropriate level of care  Description: Discharged to appropriate level of care  8/11/2021 0212 by Sandrine Her RN  Outcome: Ongoing  8/11/2021 0211 by Sandrine Her RN  Outcome: Ongoing     Problem: Serum Glucose Level - Abnormal:  Goal: Ability to maintain appropriate glucose levels will improve  Description: Ability to maintain appropriate glucose levels will improve  8/11/2021 0212 by Sandrine Her RN  Outcome: Ongoing  8/11/2021 0211 by Sandrine Her RN  Outcome: Ongoing     Problem: Sensory Perception - Impaired:  Goal: Ability to maintain a stable neurologic state will improve  Description: Ability to maintain a stable neurologic state will improve  8/11/2021 0212 by Sandrine Her RN  Outcome: Ongoing  8/11/2021 0211 by Sandrine Her RN  Outcome: Ongoing

## 2021-08-11 NOTE — CONSULTS
Vascular Surgery  Consultation    Ms. Amanda Bush is a 64year old morbidly obese female with CKD, Stage IV, who underwent left brachiocephalic AV fistula creation by Dr. Umberto Villarreal on 07/12/2021, followed by superficialization of the left upper arm cephalic vein arteriovenous fistula on 07/27/2021. She presented to the ER yesterday with complaints of shortness of breath. Work-up in the ER she was noted to be hypoxic. Labs demonstrated worsening renal function. She was started on supplemental oxygen and admitted to the hospitalist service. Patient was seen by Dr. Rio Ignacio, Nephrology, who consulted vascular surgery for placement of permcath as her fistula is not ready to be accessed. Lab Results   Component Value Date    CREATININE 3.5 (H) 08/11/2021    BUN 30 (H) 08/11/2021     08/11/2021    K 4.1 08/11/2021     08/11/2021    CO2 23 08/11/2021       Sandi Alford is a 64 y.o. female with the following history reviewed and recorded in [facility]:  Patient Active Problem List    Diagnosis Date Noted    Acute hemodialysis encounter ([facility] 08/10/2021    [facility] hypox[facility] failure ([facility] 08/10/2021    DM (diabetes mellitus) ([facility] 08/10/2021    HTN (hypertension) 08/10/2021    Allergic rhinitis with postnasal drip 04/06/2021    Deep vein thrombosis of lower extremity ([facility] 10/09/2019    Disease of liver 10/09/2019    Stage 4 chronic kidney disease ([facility] 07/24/2018    Thyroid disease 02/26/2017    Sleep apnea 02/26/2017     Last Assessment & Plan:   Patient states that she currently uses a CPAP at night for obstructive sleep apnea. She states that she has noticed that she has had more drainage and sinus pressure causing throat and uvula swelling. She states that she feels like she is gagging or choking at times while she is trying to sleep. She is concerned and wishes to be evaluated for this. I will refer to ENT at this time.   I will also treat for a possible sinus infection, unable to do steroids due to her being an insulin-dependent diabetic.       Mood disorder ([facility] 02/26/2017    Type 2 DM with CKD stage 3 and hypertension ([facility] 02/26/2017    Hypertriglyceridemia 02/26/2017    Anxiety 02/26/2017    Vitamin D deficiency 02/26/2017    Anemia 02/26/2017    Hypocalcemia 02/26/2017    Anemia of chronic renal failure 12/12/2016    Hypertension associated with diabetes ([facility] 10/17/2016    B12 deficiency 10/17/2016    Mixed diabetic hyperlipidemia associated with type 2 diabetes mellitus ([facility] 10/17/2016    Morbid obesity ([facility] 09/15/2011    Embolism ([place] [address].) 01/01/2004     Updating deleted diagnoses       Current Facility-Administered Medications   Medication Dose Route Frequency Provider Last Rate Last Admin    ceFAZolin (ANCEF) 1,000 mg in sterile water 10 mL IV syringe  1,000 mg Intravenous On Call to Tracy Grullon MD        sodium chloride 0.9 % 250 mL with heparin (porcine) 2,500 Units    PRN Christian Solis MD   252.5 mL at 08/11/21 1039    sodium chloride flush 0.9 % injection 5-40 mL  5-40 mL Intravenous 2 times per day JAMES Ventura CNP   10 mL at 08/11/21 0911    sodium chloride flush 0.9 % injection 5-40 mL  5-40 mL Intravenous PRN JAMES Ventura - CNP        0.9 % sodium chloride infusion  25 mL Intravenous PRN JAMES Ventura CNP        ondansetron (ZOFRAN-ODT) disintegrating tablet 4 mg  4 mg Oral Q8H PRN JAMES Ventura - CNP        Or    ondansetron (ZOFRAN) injection 4 mg  4 mg Intravenous Q6H PRN JAMES Ventura CNP        acetaminophen (TYLENOL) tablet 650 mg  650 mg Oral Q6H PRN JAMES Ventura - CNP        Or    acetaminophen (TYLENOL) suppository 650 mg  650 mg Rectal Q6H PRN JAMES Ventura CNP        heparin (porcine) injection 5,000 Units  5,000 Units Subcutaneous 3 times per day JAMES Ventura - CNP   5,000 Units at 08/11/21 0528    magnesium sulfate 1000 mg in dextrose 5% 100 g Intravenous PRN Barney Krabbe, APRN - CNP        glucagon (rDNA) injection 1 mg  1 mg Intramuscular PRN Barney Krabbe, APRN - CNP        dextrose 5 % solution  100 mL/hr Intravenous PRN Barney Krabbe, APRN - CNP        ipratropium-albuterol (DUONEB) nebulizer solution 1 ampule  1 ampule Inhalation Q4H [place] Barney Krabbe, APRN - CNP   1 ampule at 08/11/21 5747    hydrALAZINE (APRESOLINE) injection 10 mg  10 mg Intravenous Q6H PRN Barney Krabbe, APRN - CNP         Allergies: Codeine  Past Medical History:   Diagnosis Date    Anxiety     Arthritis     CKD (chronic kidney disease)     stage 4    Colon polyp     Depression     Embolism - blood clot 2004    Hx of blood clots     Hyperlipidemia     Hypertension     Obesity     Sleep apnea     Thyroid disease     Type II or unspecified type diabetes mellitus without mention of complication, not stated as uncontrolled      Past Surgical History:   Procedure Laterality Date    CHOLECYSTECTOMY      DIALYSIS FISTULA CREATION Left 06/18/2021    LEFT BRACHIAL-CEPHALIC AV FISTULA CREATION performed by Catrachita Black MD at [address] Left 7/27/2021    LEFT UPPER EXTREMITY CEPHALIC VEIN SUPERFICIALIZATION performed by Catrachita Black MD at [address]  10/17/2018    POLYPECTOMY      RECTAL SURGERY      fistula repair    TONSILLECTOMY AND ADENOIDECTOMY      VASCULAR SURGERY  07/12/2021    SJS- Ultrasound-guided cannulation left distal upper arm cephalic vein with 4 Bulgarian glide sheath, Left upper extremity fistulogram's including venography the superior vena cava    VASCULAR SURGERY  07/27/2021    SJS- Superficialization of the left upper arm cephalic vein arteriovenous fistula     Family History   Problem Relation Age of Onset    Lung Cancer Mother     Brain Cancer Mother     Heart Attack Father     Prostate Cancer Father     Heart Disease Father     Stroke Father     Uterine Cancer Maternal Grandmother     Cervical Cancer Maternal Grandmother     Diabetes Maternal Grandfather     Other Maternal Grandfather         histoplasmosis    Diabetes Paternal Grandfather      Social History     Tobacco Use    Smoking status: Never Smoker    Smokeless tobacco: Never Used   Substance Use Topics    Alcohol use: No       Old records have been obtained from the referring provider. These records have been reviewed and summarized. Review of Systems    Constitutional - No significant activity change, appetite change, or unexpected weight change. No fever or chills. No diaphoresis or significant fatigue. HENT - No significant rhinorrhea or epistaxis. No tinnitus or significant hearing loss. Eyes - No sudden vision change or amaurosis. Respiratory - No wheezing or stridor. No apnea, cough, or chest tightness associated with shortness of breath. + shortness of breath. Cardiovascular - No chest pain, syncope, or significant dizziness. No palpitations or significant leg swelling. No claudication. Gastrointestinal - No abdominal swelling or pain. No blood in stool. No severe constipation, diarrhea, nausea, or vomiting. Genitourinary - No dysuria, frequency, or urgency. No flank pain or hematuria. Musculoskeletal - No back pain, gait disturbance, or myalgia. Skin - No color change, rash, pallor, or new wound. Neurologic - No dizziness, facial asymmetry, or light headedness. No seizures. No speech difficulty or lateralizing weakness. Hematologic - No easy bruising or excessive bleeding. Psychiatric - No severe anxiety or nervousness. No confusion. All other review of systems are negative. Physical Exam    BP (!) 159/73   Pulse 74   Temp 97.9 °F (36.6 °C) (Temporal)   Resp 16   Ht 5' 5\" (1.651 m)   Wt (!) 389 lb 9 oz (176.7 kg)   LMP 01/01/2000   SpO2 93%   BMI 64.83 kg/m²     Constitutional - Well developed, well nourished.  No diaphoresis or acute distress. HEENT - Normocephalic. Atraumatic. ASHLIE. Neck - Supple, no tracheal deviation. No JVD. No carotid bruits. Chest - Clear bilateral breath sounds, diminished in bases without wheezes or rhonchi. Cardiovascular - Distant HT with RRR. Soft systolic murmur heard best with patient leaning forward. No rub or gallop. Abdomen - Obese, soft, non-tender with active bowel sounds x 4. Extremities - No cyanosis or clubbing. Chronic BLE with 2+ pitting edema. No signs atheroembolic event. Neurologic - Alert and oriented X 3. Grossly intact. Skin - Warm, dry, and intact. No rash, erythema, or pallor. Psychiatric - Mood, affect, and behavior appear normal. Judgment and thought processes appear normal.    Options have been discussed with the patient including continued medical management and permacath. Patient has opted to proceed with permacath. Risks have been discussed with the patient including MI, death, stroke, nerve injury, infection, bleed and steal phenomenon. Assessment    1. Acute Hypoxic Respiratory Failure, Likely 2' to Fluid Overload  2. CKD, Stage IV, Now in Need of Hemodialysis  3. DM, Type 2  4. Essential HTN  5. Mixed Hyperlipidemia  6. Obstructive Sleep Apnea - Compliant with CPAP  7. Acquired Hypothyroidism  8. Hx of Blood Clots        Plan    1.  Proceed with permacath placement      JAMES Tamayo-BC

## 2021-08-11 NOTE — PROGRESS NOTES
Adelaide Milligan arrived to room # [id number]  Presented with: short of air  Mental Status: Patient is oriented and alert. Vitals:    08/11/21 0014   BP: (!) 126/52   Pulse: 66   Resp: 18   Temp: 97.3 °F (36.3 °C)   SpO2: 94%     Patient safety contract and falls prevention contract reviewed with patient Yes. Oriented Patient to room. Call light within reach. Yes.   Needs, issues or concerns expressed at this time: no.      Electronically signed by Andrew Olmedo RN on 8/11/2021 at 3:34 AM

## 2021-08-11 NOTE — ANESTHESIA PRE PROCEDURE
Department of Anesthesiology  Preprocedure Note       Name:  Cezar Dillard   Age:  64 y.o.  :  1960                                          MRN:  [id number]         Date:  2021      Surgeon: Evelyn Ho):  Dereje Abad MD    Procedure: Procedure(s):  INSERTION CATHETER DIALYSIS    Medications prior to admission:   Prior to Admission medications    Medication Sig Start Date End Date Taking?  Authorizing Provider   magnesium oxide (MAG-OX) 400 MG tablet Take 400 mg by mouth daily   Yes Historical Provider, MD   amLODIPine (NORVASC) 10 MG tablet Take 10 mg by mouth daily  21  Yes Historical Provider, MD   carvedilol (COREG) 12.5 MG tablet Take 12.5 mg by mouth 2 times daily  21  Yes Historical Provider, MD   TRULICITY 3 WX/3.7EB SOPN 3 mg once a week  21  Yes Historical Provider, MD   levothyroxine (SYNTHROID) 88 MCG tablet Take 88 mcg by mouth Daily  21  Yes Historical Provider, MD   rosuvastatin (CRESTOR) 20 MG tablet Take 20 mg by mouth daily  21  Yes Historical Provider, MD   sodium bicarbonate 650 MG tablet Take 1,300 mg by mouth 3 times daily   Yes Historical Provider, MD   lisinopril (PRINIVIL;ZESTRIL) 40 MG tablet Take 40 mg by mouth 2 times daily Indications: High Blood Pressure Disorder   Yes Historical Provider, MD   cetirizine (ZYRTEC) 10 MG tablet Take 10 mg by mouth as needed for Allergies    Yes Historical Provider, MD   Evolocumab 140 MG/ML SOAJ Inject 140 mg into the skin every 14 days 10/21/20 9/9/21 Yes Historical Provider, MD   fluticasone (FLONASE) 50 MCG/ACT nasal spray 2 sprays by Nasal route daily as needed for Rhinitis  3/22/21  Yes Historical Provider, MD   insulin lispro (HUMALOG KWIKPEN) 200 UNIT/ML SOPN pen Inject 25-80 Units into the skin 3 times daily (with meals)  10/21/20  Yes Historical Provider, MD   Omega-3 Fatty Acids (FISH OIL) 1000 MG CAPS Take 1,000 mg by mouth Daily with supper    Yes Historical Provider, MD   epoetin jose ramon (EPOGEN;PROCRIT) 47402 UNIT/ML injection Inject 10,000 Units into the skin every 30 days    Yes Historical Provider, MD   lamoTRIgine (LAMICTAL) 100 MG tablet Take 100 mg by mouth daily Indications: 1/2 tablet with dinner   Yes Historical Provider, MD   allopurinol (ZYLOPRIM) 100 MG tablet Take 100 mg by mouth 2 times daily   Yes Historical Provider, MD   Ergocalciferol (VITAMIN D2 PO) Take 50,000 Units by mouth three times a week    Yes Historical Provider, MD   citalopram (CELEXA) 40 MG tablet Take 40 mg by mouth daily   Yes Historical Provider, MD   busPIRone (BUSPAR) 10 MG tablet Take 20 mg by mouth 2 times daily   Yes Historical Provider, MD   aspirin 81 MG tablet Take 81 mg by mouth daily With Dinner   Yes Historical Provider, MD   Insulin Glargine (LANTUS SC) Inject 90 Units into the skin daily With Evening Meals   Yes Historical Provider, MD   Potassium 99 MG TABS Take 1 tablet by mouth nightly    Historical Provider, MD   dexamethasone (DECADRON) 1 MG tablet Take 1 mg by mouth daily (with breakfast)    Historical Provider, MD   diazePAM (VALIUM) 2 MG tablet Take 2 mg by mouth as needed for Anxiety.      Historical Provider, MD   Insulin Pen Needle (B-D ULTRAFINE III SHORT PEN) 31G X 8 MM MISC USE AS DIRECTED TO INJECT FOUR TIMES DAILY 10/21/20   Historical Provider, MD   Insulin Syringe-Needle U-100 (BD INSULIN SYRINGE U/F) 31G X 5/16\" 1 ML MISC AS DIRECTED FOUR TIMES A DAY 10/27/20   Historical Provider, MD   FreeStyle Lancets MISC Use as instructed 4 x daily 10/21/20   Historical Provider, MD       Current medications:    Current Facility-Administered Medications   Medication Dose Route Frequency Provider Last Rate Last Admin    [MAR Hold] ceFAZolin (ANCEF) 1,000 mg in sterile water 10 mL IV syringe  1,000 mg Intravenous On Call to Arturo Calixto MD        sodium chloride 0.9 % 250 mL with heparin (porcine) 2,500 Units    PRN Bonita Akers MD   252.5 mL at 08/11/21 1039    b complex-C-folic acid (NEPHROCAPS) capsule 1 mg  1 capsule Oral Daily Alton Hopkins MD        epoetin jose ramon-epbx (RETACRIT) injection 3,000 Units  3,000 Units Subcutaneous Once per day on Mon Wed Fri Alton Hopkins MD        0.45 % sodium chloride infusion   Intravenous Continuous Tee Amabile,  mL/hr at 08/11/21 1117 New Bag at 08/11/21 1117    [MAR Hold] sodium chloride flush 0.9 % injection 5-40 mL  5-40 mL Intravenous 2 times per day JAMES Lizama - CNP   10 mL at 08/11/21 0911    [MAR Hold] sodium chloride flush 0.9 % injection 5-40 mL  5-40 mL Intravenous PRN Addie Joyce APRN - CNP        [facility] Hold] 0.9 % sodium chloride infusion  25 mL Intravenous PRN Addie Joyce APRN - CNP        [MAR Hold] ondansetron (ZOFRAN-ODT) disintegrating tablet 4 mg  4 mg Oral Q8H PRN Addie Joyce APRN - CNP        Or    [MAR Hold] ondansetron (ZOFRAN) injection 4 mg  4 mg Intravenous Q6H PRN Addie Joyce APRN - CNP        [MAR Hold] acetaminophen (TYLENOL) tablet 650 mg  650 mg Oral Q6H PRN Addie Joyce, APRN - CNP        Or    [MAR Hold] acetaminophen (TYLENOL) suppository 650 mg  650 mg Rectal Q6H PRN Addie Joyce, APRN - CNP        [MAR Hold] heparin (porcine) injection 5,000 Units  5,000 Units Subcutaneous 3 times per day JAMES Lizama - CNP   5,000 Units at 08/11/21 0528    [MAR Hold] magnesium sulfate 1000 mg in dextrose 5% 100 mL IVPB  1,000 mg Intravenous PRN Addie Isiah, APRN - CNP        [MAR Hold] potassium chloride (KLOR-CON M) extended release tablet 40 mEq  40 mEq Oral PRN Addie Eaton, APRN - CNP        Or   Hessie Buckle Hold] potassium bicarb-citric acid (EFFER-K) effervescent tablet 40 mEq  40 mEq Oral PRN Addie Eaton, APRN - CNP        Or   Hessie Buckle Hold] potassium chloride 10 mEq/100 mL IVPB (Peripheral Line)  10 mEq Intravenous PRN JAMES Lizama - CNP        [MAR Hold] allopurinol (ZYLOPRIM) tablet 100 mg  100 mg Oral BID JAMES Lizama - CNP   100 mg at 08/11/21 0911    [MAR Hold] amLODIPine (NORVASC) tablet 10 mg  10 mg Oral Daily JAMES Neil CNP   10 mg at 08/11/21 0910    [MAR Hold] aspirin chewable tablet 81 mg  81 mg Oral Daily JAMES Neil CNP   81 mg at 08/11/21 0911    [MAR Hold] carvedilol (COREG) tablet 12.5 mg  12.5 mg Oral BID JAMES Neil CNP   12.5 mg at 08/11/21 0911    [MAR Hold] citalopram (CELEXA) tablet 40 mg  40 mg Oral Daily JAMES Neil CNP   40 mg at 08/11/21 0911    [MAR Hold] lamoTRIgine (LAMICTAL) tablet 100 mg  100 mg Oral Daily JAMES Neil CNP   100 mg at 08/11/21 0911    [MAR Hold] levothyroxine (SYNTHROID) tablet 88 mcg  88 mcg Oral Daily JAMES Neil CNP        [facility] Hold] sodium bicarbonate tablet 1,300 mg  1,300 mg Oral TID JAMES Neil CNP   1,300 mg at 08/11/21 0911    [MAR Hold] rosuvastatin (CRESTOR) tablet 20 mg  20 mg Oral Daily JAMES Neil CNP   20 mg at 08/11/21 0910    [MAR Hold] insulin glargine (LANTUS) injection vial 25 Units  25 Units Subcutaneous Nightly JAMES Neil CNP   25 Units at 08/10/21 2317    [MAR Hold] insulin lispro (HUMALOG) injection vial 0-12 Units  0-12 Units Subcutaneous TID WC JAMES Neil CNP   6 Units at 08/11/21 0911    [MAR Hold] insulin lispro (HUMALOG) injection vial 0-6 Units  0-6 Units Subcutaneous Nightly JAMES Neil CNP        [MAR Hold] glucose (GLUTOSE) 40 % oral gel 15 g  15 g Oral PRN JAMES Neil CNP        [MAR Hold] dextrose 50 % IV solution  12.5 g Intravenous PRN JAMES Neil CNP        [MAR Hold] glucagon (rDNA) injection 1 mg  1 mg Intramuscular PRN JAMES Neil CNP        [MAR Hold] dextrose 5 % solution  100 mL/hr Intravenous PRN JAMES Neil CNP        [MAR Hold] ipratropium-albuterol (DUONEB) nebulizer solution 1 ampule  1 ampule Inhalation Q4H [address] [facility], JAMES - CNP 1 ampule at 08/11/21 0635    [MAR Hold] hydrALAZINE (APRESOLINE) injection 10 mg  10 mg Intravenous Q6H PRN JAMES Andrade - CNP           Allergies:     Allergies   Allergen Reactions    Codeine Nausea And Vomiting     N/v/felt hot       Problem List:    Patient Active Problem List   Diagnosis Code    Morbid obesity ([facility] E66.01    Thyroid disease E07.9    Sleep apnea G47.30    Mood disorder (HCC) F39    Hypertension associated with diabetes ([facility] E11.59, I15.2    Anemia of chronic renal failure N18.9, D63.1    Type 2 DM with CKD stage 3 and hypertension (HCC) E11.22, I12.9, N18.30    Hypertriglyceridemia E78.1    Anxiety F41.9    Embolism (HCC) I74.9    Vitamin D deficiency E55.9    Anemia D64.9    Hypocalcemia E83.51    Allergic rhinitis with postnasal drip J30.9, R09.82    B12 deficiency E53.8    Deep vein thrombosis of lower extremity (HCC) I82.409    Disease of liver K76.9    Mixed diabetic hyperlipidemia associated with type 2 diabetes mellitus (HCC) E11.69, E78.2    Stage 4 chronic kidney disease (HCC) N18.4    Acute hemodialysis encounter ([facility] Z99.2    Acute hypoxemic respiratory failure (HCC) J96.01    DM (diabetes mellitus) (HCC) E11.9    HTN (hypertension) I10       Past Medical History:        Diagnosis Date    Anxiety     Arthritis     CKD (chronic kidney disease)     stage 4    Colon polyp     Depression     Embolism - blood clot 2004    Hx of blood clots     Hyperlipidemia     Hypertension     Obesity     Sleep apnea     Thyroid disease     Type II or unspecified type diabetes mellitus without mention of complication, not stated as uncontrolled        Past Surgical History:        Procedure Laterality Date    CHOLECYSTECTOMY      DIALYSIS FISTULA CREATION Left 06/18/2021    LEFT BRACHIAL-CEPHALIC AV FISTULA CREATION performed by Harshal Hollins MD at [facility] [facility] Left 7/27/2021    LEFT UPPER EXTREMITY CEPHALIC VEIN SUPERFICIALIZATION performed by Cris [facility]ula MD at [facility] 21  10/17/2018    POLYPECTOMY      RECTAL SURGERY      fistula repair    TONSILLECTOMY AND ADENOIDECTOMY      VASCULAR SURGERY  07/12/2021    SJS- Ultrasound-guided cannulation left distal upper arm cephalic vein with 4 Danish glide sheath, Left upper extremity fistulogram's including venography the superior vena cava    VASCULAR SURGERY  07/27/2021    SJS- Superficialization of the left upper arm cephalic vein arteriovenous fistula       Social History:    Social History     Tobacco Use    Smoking status: Never Smoker    Smokeless tobacco: Never Used   Substance Use Topics    Alcohol use: No                                Counseling given: Not Answered      Vital Signs (Current):   Vitals:    08/10/21 2058 08/11/21 0014 08/11/21 0433 08/11/21 0707   BP: (!) 181/89 (!) 126/52  (!) 159/73   Pulse: 80 66  74   Resp: 20 18  16   Temp: 97.2 °F (36.2 °C) 97.3 °F (36.3 °C)  97.9 °F (36.6 °C)   TempSrc: Temporal Temporal  Temporal   SpO2: 98% 94%  93%   Weight: (!) 389 lb 9 oz (176.7 kg)  (!) 389 lb 9 oz (176.7 kg)    Height: 5' 5\" (1.651 m)                                                 BP Readings from Last 3 Encounters:   08/11/21 (!) 159/73   07/27/21 (!) 145/78   07/27/21 (!) 159/56       NPO Status: Time of last liquid consumption: 2350                        Time of last solid consumption: 2350                        Date of last liquid consumption: 08/10/21                        Date of last solid food consumption: 08/10/21    BMI:   Wt Readings from Last 3 Encounters:   08/11/21 (!) 389 lb 9 oz (176.7 kg)   07/27/21 (!) 383 lb (173.7 kg)   07/12/21 (!) 393 lb (178.3 kg)     Body mass index is 64.83 kg/m².     CBC:   Lab Results   Component Value Date    WBC 10.4 08/11/2021    RBC 3.01 08/11/2021    HGB 9.3 08/11/2021    HCT 29.2 08/11/2021    HCT 30.9 05/23/2012    MCV 97.0 08/11/2021    RDW 15.1 08/11/2021     08/11/2021     05/23/2012       CMP:   Lab Results   Component Value Date     08/11/2021     05/23/2012    K 4.1 08/11/2021    K 4.4 05/23/2012     08/11/2021     05/23/2012    CO2 23 08/11/2021    BUN 30 08/11/2021    CREATININE 3.5 08/11/2021    CREATININE 1.9 05/23/2012    GFRAA 16 08/11/2021    LABGLOM 13 08/11/2021    GLUCOSE 242 08/11/2021    PROT 6.0 08/11/2021    PROT 6.8 01/09/2013    CALCIUM 8.5 08/11/2021    BILITOT 0.3 08/11/2021    ALKPHOS 88 08/11/2021    ALKPHOS 74 10/07/2011    AST 8 08/11/2021    ALT 6 08/11/2021       POC Tests:   Recent Labs     08/10/21  2338   POCGLU 183*       Coags:   Lab Results   Component Value Date    PROTIME 12.7 07/27/2021    PROTIME 16.08 02/29/2012    INR 0.93 07/27/2021    APTT 26.0 07/27/2021       HCG (If Applicable): No results found for: PREGTESTUR, PREGSERUM, HCG, HCGQUANT     ABGs: No results found for: PHART, PO2ART, ZYA6HTL, CBH7FAU, BEART, [id number]     Type & Screen (If Applicable):  No results found for: LABABO, LABRH    Drug/Infectious Status (If Applicable):  No results found for: HIV, HEPCAB    COVID-19 Screening (If Applicable):   Lab Results   Component Value Date    COVID19 Not Detected 08/10/2021           Anesthesia Evaluation  Patient summary reviewed and Nursing notes reviewed no history of anesthetic complications:   Airway: Mallampati: II  TM distance: >3 FB   Neck ROM: full  Mouth opening: < 3 FB Dental: normal exam         Pulmonary:Negative Pulmonary ROS and normal exam  breath sounds clear to auscultation  (+) sleep apnea: on CPAP,      (-) shortness of breath and not a current smoker          Patient did not smoke on day of surgery.                  Cardiovascular:    (+) hypertension:, hyperlipidemia    (-) CAD,  angina and  CHF    NYHA Classification: I  ECG reviewed  Rhythm: regular  Rate: normal           Beta Blocker:  Not on Beta Blocker         Neuro/Psych:   Negative

## 2021-08-11 NOTE — CONSULTS
Renal Consult Note    Thank you to requesting provider: Dr Lisa Lewis, for asking us to see Sarah Rai    Reason for consultation: CKD now stage V    Chief Complaint: Dyspnea    History of Presenting Illness      Patient is a 51-year-old very pleasant woman with past medical history of morbid obesity, type 2 diabetes complicated with renal disease, and chronic kidney disease now stage V. She is followed at the renal clinic by Dr. Maria Isabel Moya. Patient is status post left brachiocephalic AV fistula creation by Dr. Ruslan Green on July 12 followed by superficialization of the left cephalic vein on July 27. Patient presented to the ED for increasing shortness of breath and weakness. Continues to have urine output. Denies NSAIDs abuse. Has required some supplemental oxygen.     Past Medical/Surgical History      Active Ambulatory Problems     Diagnosis Date Noted    Morbid obesity ([address] 09/15/2011    Thyroid disease 02/26/2017    Sleep apnea 02/26/2017    Mood disorder ([address] 02/26/2017    Hypertension associated with diabetes ([address] 10/17/2016    Anemia of chronic renal failure 12/12/2016    Type 2 DM with CKD stage 3 and hypertension ([address] 02/26/2017    Hypertriglyceridemia 02/26/2017    Anxiety 02/26/2017    Embolism ([address] 01/01/2004    Vitamin D deficiency 02/26/2017    Anemia 02/26/2017    Hypocalcemia 02/26/2017    Allergic rhinitis with postnasal drip 04/06/2021    B12 deficiency 10/17/2016    Deep vein thrombosis of lower extremity ([address] 10/09/2019    Disease of liver 10/09/2019    Mixed diabetic hyperlipidemia associated with type 2 diabetes mellitus ([address] 10/17/2016    Stage 4 chronic kidney disease ([address] 07/24/2018     Resolved Ambulatory Problems     Diagnosis Date Noted    No Resolved Ambulatory Problems     Past Medical History:   Diagnosis Date    Arthritis     CKD (chronic kidney disease)     Colon polyp     Depression     Embolism - blood clot 2004    Hx of blood clots     Hyperlipidemia     Hypertension     Obesity     Type II or unspecified type diabetes mellitus without mention of complication, not stated as uncontrolled          Review of Systems     Constitutional:  No weight loss, no fever/chills  Eyes:  No eye pain, no eye redness  Cardiovascular:  No chest pain, + worsening of edema  Respiratory:  No hemoptysis, no stidor  Gastrointestinal:  No blood in stool, no n/v, no diarrhea  Genitoruinary:  No hematuria, no difficulty with urination  Musculoskeletal:  No joint swelling, no redness  Integumentary:  No Rash, no itching  Neurological:  No focal weakness, No new sensory deficit  Psychiatric:  No depression, no confusion  Endocrine:  No polyuria, no polydipsia       Medications        Current Facility-Administered Medications:     ceFAZolin (ANCEF) 1,000 mg in sterile water 10 mL IV syringe, 1,000 mg, Intravenous, On Call to OR, Carolina Manning MD    sodium chloride 0.9 % 250 mL with heparin (porcine) 2,500 Units, , , PRN, Carolina Manning MD, 252.5 mL at 08/11/21 1039    sodium chloride flush 0.9 % injection 5-40 mL, 5-40 mL, Intravenous, 2 times per day, Mignon Licona, APRN - CNP, 10 mL at 08/11/21 0911    sodium chloride flush 0.9 % injection 5-40 mL, 5-40 mL, Intravenous, PRN, Mignon Licona, APRN - CNP    0.9 % sodium chloride infusion, 25 mL, Intravenous, PRN, Mignon Julios, APRN - CNP    ondansetron (ZOFRAN-ODT) disintegrating tablet 4 mg, 4 mg, Oral, Q8H PRN **OR** ondansetron (ZOFRAN) injection 4 mg, 4 mg, Intravenous, Q6H PRN, Mignon Julios, APRN - CNP    acetaminophen (TYLENOL) tablet 650 mg, 650 mg, Oral, Q6H PRN **OR** acetaminophen (TYLENOL) suppository 650 mg, 650 mg, Rectal, Q6H PRN, Mignon Julios, APRN - CNP    heparin (porcine) injection 5,000 Units, 5,000 Units, Subcutaneous, 3 times per day, Mignon Livan, APRN - CNP, 5,000 Units at 08/11/21 0528    magnesium sulfate 1000 mg in dextrose 5% 100 mL IVPB, 1,000 mg, Intravenous, PRN, JAMES Neil CNP    potassium chloride (KLOR-CON M) extended release tablet 40 mEq, 40 mEq, Oral, PRN **OR** potassium bicarb-citric acid (EFFER-K) effervescent tablet 40 mEq, 40 mEq, Oral, PRN **OR** potassium chloride 10 mEq/100 mL IVPB (Peripheral Line), 10 mEq, Intravenous, PRN, JAMES Neil CNP    allopurinol (ZYLOPRIM) tablet 100 mg, 100 mg, Oral, BID, JAMES Neil - CNP, 100 mg at 08/11/21 0911    amLODIPine (NORVASC) tablet 10 mg, 10 mg, Oral, Daily, JAMES Neil - CNP, 10 mg at 08/11/21 0910    aspirin chewable tablet 81 mg, 81 mg, Oral, Daily, JAMES Neil - CNP, 81 mg at 08/11/21 0911    carvedilol (COREG) tablet 12.5 mg, 12.5 mg, Oral, BID, JAMES Neil - CNP, 12.5 mg at 08/11/21 0911    citalopram (CELEXA) tablet 40 mg, 40 mg, Oral, Daily, JAMES Neil - CNP, 40 mg at 08/11/21 0911    lamoTRIgine (LAMICTAL) tablet 100 mg, 100 mg, Oral, Daily, JAMES Neil - CNP, 100 mg at 08/11/21 0911    levothyroxine (SYNTHROID) tablet 88 mcg, 88 mcg, Oral, Daily, JAMES Neil CNP    sodium bicarbonate tablet 1,300 mg, 1,300 mg, Oral, TID, JAMES Neil - CNP, 1,300 mg at 08/11/21 0911    rosuvastatin (CRESTOR) tablet 20 mg, 20 mg, Oral, Daily, JAMES Neil - CNP, 20 mg at 08/11/21 0910    insulin glargine (LANTUS) injection vial 25 Units, 25 Units, Subcutaneous, Nightly, JAMES Neil - CNP, 25 Units at 08/10/21 2317    insulin lispro (HUMALOG) injection vial 0-12 Units, 0-12 Units, Subcutaneous, TID WC, JAMES Neil CNP, 6 Units at 08/11/21 0911    insulin lispro (HUMALOG) injection vial 0-6 Units, 0-6 Units, Subcutaneous, Nightly, JAMES Alvarado CNP    glucose (GLUTOSE) 40 % oral gel 15 g, 15 g, Oral, PRN, JAMES Neil CNP    dextrose 50 % IV solution, 12.5 g, Intravenous, PRN, JAMES Neil CNP    glucagon (rDNA) injection 1 mg, 1 mg, Intramuscular, PRN, Dominick Crittenden, APRN - CNP    dextrose 5 % solution, 100 mL/hr, Intravenous, PRN, Dominick Crittenden, APRN - CNP    ipratropium-albuterol (DUONEB) nebulizer solution 1 ampule, 1 ampule, Inhalation, Q4H WA, Dominick Wilkinsk, APRN - CNP, 1 ampule at 08/11/21 6979    hydrALAZINE (APRESOLINE) injection 10 mg, 10 mg, Intravenous, Q6H PRN, Dominick Crittenden, APRN - CNP  Outpatient Medications Marked as Taking for the 8/10/21 encounter [facility] Encounter)   Medication Sig Dispense Refill    magnesium oxide (MAG-OX) 400 MG tablet Take 400 mg by mouth daily      amLODIPine (NORVASC) 10 MG tablet Take 10 mg by mouth daily       carvedilol (COREG) 12.5 MG tablet Take 12.5 mg by mouth 2 times daily       TRULICITY 3 GS/9.4TW SOPN 3 mg once a week       levothyroxine (SYNTHROID) 88 MCG tablet Take 88 mcg by mouth Daily       rosuvastatin (CRESTOR) 20 MG tablet Take 20 mg by mouth daily       sodium bicarbonate 650 MG tablet Take 1,300 mg by mouth 3 times daily      lisinopril (PRINIVIL;ZESTRIL) 40 MG tablet Take 40 mg by mouth 2 times daily Indications: High Blood Pressure Disorder      cetirizine (ZYRTEC) 10 MG tablet Take 10 mg by mouth as needed for Allergies       Evolocumab 140 MG/ML SOAJ Inject 140 mg into the skin every 14 days      fluticasone (FLONASE) 50 MCG/ACT nasal spray 2 sprays by Nasal route daily as needed for Rhinitis       insulin lispro (HUMALOG KWIKPEN) 200 UNIT/ML SOPN pen Inject 25-80 Units into the skin 3 times daily (with meals)       Omega-3 Fatty Acids (FISH OIL) 1000 MG CAPS Take 1,000 mg by mouth Daily with supper       epoetin jose ramon (EPOGEN;PROCRIT) 67585 UNIT/ML injection Inject 10,000 Units into the skin every 30 days       lamoTRIgine (LAMICTAL) 100 MG tablet Take 100 mg by mouth daily Indications: 1/2 tablet with dinner      allopurinol (ZYLOPRIM) 100 MG tablet Take 100 mg by mouth 2 times daily      Ergocalciferol (VITAMIN D2 PO) Take 50,000 Units by mouth three times a week       citalopram (CELEXA) 40 MG tablet Take 40 mg by mouth daily      busPIRone (BUSPAR) 10 MG tablet Take 20 mg by mouth 2 times daily      aspirin 81 MG tablet Take 81 mg by mouth daily With Dinner      Insulin Glargine (LANTUS SC) Inject 90 Units into the skin daily With Evening Meals         Allergies   Codeine    Family History       Family History   Problem Relation Age of Onset    Lung Cancer Mother     Brain Cancer Mother     Heart Attack Father     Prostate Cancer Father     Heart Disease Father     Stroke Father     Uterine Cancer Maternal Grandmother     Cervical Cancer Maternal Grandmother     Diabetes Maternal Grandfather     Other Maternal Grandfather         histoplasmosis    Diabetes Paternal Grandfather      Family history negative for kidney disease. Social History      Social History     Socioeconomic History    Marital status:      Spouse name: Not on file    Number of children: Not on file    Years of education: Not on file    Highest education level: Not on file   Occupational History    Not on file   Tobacco Use    Smoking status: Never Smoker    Smokeless tobacco: Never Used   Vaping Use    Vaping Use: Never used   Substance and Sexual Activity    Alcohol use: No    Drug use: No    Sexual activity: Never   Other Topics Concern    Not on file   Social History Narrative    Not on file     Social Determinants of Health     Financial Resource Strain:     Difficulty of Paying Living Expenses:    Food Insecurity:     Worried About Running Out of Food in the Last Year:     [address] in the Last Year:    Transportation Needs:     Lack of Transportation (Medical):      Lack of Transportation (Non-Medical):    Physical Activity:     Days of Exercise per Week:     Minutes of Exercise per Session:    Stress:     Feeling of Stress :    Social Connections:     Frequency of Communication with Friends and Family:     Frequency of Social Gatherings with Friends and Family:     Attends Alevism Services:     Active Member of Clubs or Organizations:     Attends Club or Organization Meetings:     Marital Status:    Intimate Partner Violence:     Fear of Current or Ex-Partner:     Emotionally Abused:     Physically Abused:     Sexually Abused:        Physical Exam     Blood pressure (!) 159/73, pulse 74, temperature 97.9 °F (36.6 °C), temperature source Temporal, resp. rate 16, height 5' 5\" (1.651 m), weight (!) 389 lb 9 oz (176.7 kg), last menstrual period 01/01/2000, SpO2 93 %, not currently breastfeeding. General:  NAD, A+Ox3, ill-appearing, normal body habitus  HEENT:  PERRL, EOMI  Neck:  Supple, normal range of movement  Chest:  CTAB, good respiratory effort, good air movement  CV:  RRR, soft systolic murmur  Abdomen:  NTND, soft, +BS, no hepatosplenomegaly  Extremities: Trace peripheral edema  Neurological:  Moving all four extremities  Lymphatics:  No palpable lymph nodes   Skin:  No rash, no jaundice  Psychiatric:  Normal insight and judgement, good recall    Data     Recent Labs     08/10/21  1639 08/11/21  0438   WBC 10.6 10.4   HGB 9.7* 9.3*   HCT 30.8* 29.2*   MCV 97.5 97.0    204     Recent Labs     08/10/21  1721 08/11/21  0438    138   K 3.9 4.1    104   CO2 23 23   GLUCOSE 238* 242*   MG 1.9  --    BUN 30* 30*   CREATININE 3.5* 3.5*   LABGLOM 13* 13*   GFRAA 16* 16*       Assessment:  -Chronic kidney disease now stage V  -Type 2 diabetes with renal disease  -Hypertension  -Morbid obesity  -Dyspnea  -Anemia of chronic kidney disease      Plan:  · Patient's left upper extremity arteriovenous fistula does not appear to be very well mature yet. We will check with vascular surgeon and consider placing a PermCath in order to start dialysis since the patient is getting uremic. Otherwise we will add multivitamins and erythropoietin stimulating agents to manage her anemia.   We will also check

## 2021-08-11 NOTE — OP NOTE
Preoperative Diagnosis:    1. ESRD requiring hemodialysis     Postoperative Diagnosis:  Same    Operative Procedure:    1. Ultrasound guided cannulation of right internal jugular vein   2. Placement of right internal jugular vein tunneled dialysis catheter (Bard Glidepath 23 cm tip to cuff). Surgeon:  Laura Zarate. Umberto Villarreal MD    Anesthesia:  Local and Moderate Sedation    EBL:  Less than 50 ml    Findings:  1. The right internal jugular vein is patent. 2.  The dialysis catheter tips are in the right atrium/superior vena cava junction. Procedure in Detail:    After the patient was consented, and given intravenous antibiotics, the patient was brought to the hybrid operating room and placed on the table in the supine position. LMA general anesthesia was administered and the patient's right neck and chest were prepped and draped in the usual sterile fashion. The right internal jugular vein was cannulated under ultrasound guidance with a micropuncture needle. Seldinger technique was utilized to place an 0.035 wire into the inferior vena cava under fluoroscopic visualization. The right neck and chest were anesthetized with lidocaine. An incision was made in the right neck over the wire with knife. A separate incision was made in the right chest with knife. The catheter was tunneled from the chest to the neck incision. Dilators were passed over the wire under fluoroscopic visualization. A dilator/tear-away sheath was placed over the wire under fluoroscopic visualization and the dilator and wire were removed. The catheter was placed through the tear-away sheath and down to the right atrium under fluoroscopic visualization. The tear-away sheath was removed and the catheter was withdrawn until the tips were in the superior vena cava/right atrial junction. The right neck wound was closed in layers with 3-0 vicryl subcutaneous, 3-0 nylon sutures and dermabond skin adhesive.   The exit site was secured around the catheter with 3-0 vicryl subcutaneous purstring suture. The catheter itself was secured to the chest with two 2-0 nylon sutures. Sterile dressings were placed. Both ports of the catheter aspirated and flushed easily with heparinized saline and heparin lock. The catheter is ready for use.

## 2021-08-11 NOTE — PLAN OF CARE
Vascular Surgery  Dr.Scott Jaskaran Mcdonald   Daily Progress Note  Pt Name: Julián Silva Record Number: [id number]  Date of Birth 1960   Today's Date: 8/11/2021    SUBJECTIVE:     Patient was seen and LUE brachial-cephalic fistula site examined. Patient stating arm feels good since recent superficialization done on 7/27/21. Stating her kidney doctor says she needs to initiate dialysis now, she was hoping her arm would be ready before she needed to start. Patient stating she does get short of breath and her oxygen drops when she is lying down, wears her Cpap and that does help some. OBJECTIVE:     Patient Vitals for the past 24 hrs:   BP Temp Temp src Pulse Resp SpO2 Height Weight   08/11/21 0707 (!) 159/73 97.9 °F (36.6 °C) Temporal 74 16 93 % -- --   08/11/21 0433 -- -- -- -- -- -- -- (!) 389 lb 9 oz (176.7 kg)   08/11/21 0014 (!) 126/52 97.3 °F (36.3 °C) Temporal 66 18 94 % -- --   08/10/21 2058 (!) 181/89 97.2 °F (36.2 °C) Temporal 80 20 98 % 5' 5\" (1.651 m) (!) 389 lb 9 oz (176.7 kg)   08/10/21 1912 (!) 153/71 -- -- 75 20 96 % -- --   08/10/21 1830 (!) 145/61 -- -- 74 16 92 % -- --   08/10/21 1713 (!) 149/71 -- -- 76 16 93 % -- --   08/10/21 1610 (!) 206/87 98.5 °F (36.9 °C) -- 92 30 (!) 85 % -- (!) 383 lb (173.7 kg)       Intake/Output Summary (Last 24 hours) at 8/11/2021 1011  Last data filed at 8/11/2021 0534  Gross per 24 hour   Intake 550 ml   Output 1200 ml   Net -650 ml     In: 550 [P.O.:550]  Out: 1200 [Urine:1200]    I/O last 3 completed shifts:   In: 550 [P.O.:550]  Out: 1200 [Urine:1200]     Date 08/11/21 0000 - 08/11/21 2359   Shift 0000-0759 8341-0218 1058-0934 24 Hour Total   INTAKE   I.V.(mL/kg) 0(0)   0(0)   Shift Total(mL/kg) 0(0)   0(0)   OUTPUT   Urine(mL/kg/hr) 425(0.3)   425   Shift Total(mL/kg) 425(2.4)   425(2.4)   Weight (kg) 176.7 176.7 176.7 176.7     Wt Readings from Last 3 Encounters:   08/11/21 (!) 389 lb 9 oz (176.7 kg)   07/27/21 (!) 383 lb (173.7 kg)   07/12/21 (!) 393 lb (178.3 kg)        Body mass index is 64.83 kg/m². Diet: Diet NPO    MEDS:     Scheduled Meds:   ceFAZolin  1,000 mg Intravenous On Call to OR    chlorhexidine gluconate   Topical Once    sodium chloride flush  5-40 mL Intravenous 2 times per day    heparin (porcine)  5,000 Units Subcutaneous 3 times per day    allopurinol  100 mg Oral BID    amLODIPine  10 mg Oral Daily    aspirin  81 mg Oral Daily    carvedilol  12.5 mg Oral BID    citalopram  40 mg Oral Daily    lamoTRIgine  100 mg Oral Daily    levothyroxine  88 mcg Oral Daily    sodium bicarbonate  1,300 mg Oral TID    rosuvastatin  20 mg Oral Daily    insulin glargine  25 Units Subcutaneous Nightly    insulin lispro  0-12 Units Subcutaneous TID WC    insulin lispro  0-6 Units Subcutaneous Nightly    ipratropium-albuterol  1 ampule Inhalation Q4H WA     Continuous Infusions:   sodium chloride      dextrose       PRN Meds:sodium chloride flush, 5-40 mL, PRN  sodium chloride, 25 mL, PRN  ondansetron, 4 mg, Q8H PRN   Or  ondansetron, 4 mg, Q6H PRN  acetaminophen, 650 mg, Q6H PRN   Or  acetaminophen, 650 mg, Q6H PRN  magnesium sulfate, 1,000 mg, PRN  potassium chloride, 40 mEq, PRN   Or  potassium alternative oral replacement, 40 mEq, PRN   Or  potassium chloride, 10 mEq, PRN  glucose, 15 g, PRN  dextrose, 12.5 g, PRN  glucagon (rDNA), 1 mg, PRN  dextrose, 100 mL/hr, PRN  hydrALAZINE, 10 mg, Q6H PRN      PHYSICAL EXAM:     CONSTITUTIONAL: awake, alert, cooperative, no apparent distress (sitting on side of bed with 02 on per NC  LUNGS:Clear bilaterally anteriorly, diminished lower lobe breath sounds. CARDIOVASCULAR: Heart regular rate and rhythm, murmur noted  ABDOMEN: soft, obese, nontender, nondistended  NEUROLOGIC: Awake, alert, oriented to name, place and time. WOUND/INCISION:  LUE fistula site with incision lines well healed, + thrill and bruit noted.  Left hand warm to touch, pink color, +radial pulse noted  EXTREMITY: Feet warm to touch, pink color, chronic LE swelling, does have 2+ pitting edema from knees to feet    LABS:     CBC:   Recent Labs     08/10/21  1639 08/11/21  0438   WBC 10.6 10.4   RBC 3.16* 3.01*   HGB 9.7* 9.3*   HCT 30.8* 29.2*   MCV 97.5 97.0   MCH 30.7 30.9   MCHC 31.5* 31.8*   RDW 15.4* 15.1*    204   MPV 10.2 10.9      Last 3 CMP:   Recent Labs     08/10/21  1721 08/11/21  0438    138   K 3.9 4.1    104   CO2 23 23   BUN 30* 30*   CREATININE 3.5* 3.5*   GLUCOSE 238* 242*   CALCIUM 8.4* 8.5*   PROT 6.1* 6.0*   LABALBU 2.9* 2.8*   BILITOT 0.3 0.3   ALKPHOS 93 88   AST <5 8   ALT 6 6      Troponin:   Recent Labs     08/10/21  1721   TROPONINI <0.01     Calcium:   Lab Results   Component Value Date    CALCIUM 8.5 08/11/2021    CALCIUM 8.4 08/10/2021    CALCIUM 8.9 07/27/2021        DVT prophylaxis:                                  [x] SQ Heparin                                  ASSESSMENT:     1. POD #   2. HD # 1  Active Hospital Problems    Diagnosis Date Noted    Acute hemodialysis encounter ([place] [address] [Z99.2] 08/10/2021    Acute hypoxemic respiratory failure ([place] [address] [J96.01] 08/10/2021    DM (diabetes mellitus) ([facility] [E11.9] 08/10/2021    HTN (hypertension) [I10] 08/10/2021    Stage 4 chronic kidney disease ([facility] [N18.4] 07/24/2018    Thyroid disease [E07.9] 02/26/2017   3. Chief Complaint:  Chief Complaint   Patient presents with    Shortness of Breath       PLAN:     1. NPO since midnight , will need tunneled dialysis catheter due to fistula not ready for use  2.    Will need to go to the OR for placement, patient anxious and sat drops when she is lying down

## 2021-08-11 NOTE — PLAN OF CARE
Problem: Falls - Risk of:  Goal: Will remain free from falls  Description: Will remain free from falls  Outcome: Ongoing  Goal: Absence of physical injury  Description: Absence of physical injury  Outcome: Ongoing     Problem: Discharge Planning:  Goal: Discharged to appropriate level of care  Description: Discharged to appropriate level of care  Outcome: Ongoing     Problem: Serum Glucose Level - Abnormal:  Goal: Ability to maintain appropriate glucose levels will improve  Description: Ability to maintain appropriate glucose levels will improve  Outcome: Ongoing     Problem: Sensory Perception - Impaired:  Goal: Ability to maintain a stable neurologic state will improve  Description: Ability to maintain a stable neurologic state will improve  Outcome: Ongoing

## 2021-08-11 NOTE — ED NOTES
Pt oxygen sat dropped to 78% on room air when ambulating to the bathroom and back to bed     Alexus Paulson RN  08/10/21 2019

## 2021-08-11 NOTE — PROGRESS NOTES
Patient post op placement of right IJ tunneled hemodialysis catheter earlier today per . Patient in dialysis and treatment running via new catheter with good flow rates noted. Dressing CDI, small incision at neck with Dermabond and sutures intact, no bleeding or hematoma noted.

## 2021-08-11 NOTE — PLAN OF CARE
Problem: Falls - Risk of:  Goal: Will remain free from falls  Description: Will remain free from falls  8/11/2021 0339 by Sandrine Her RN  Outcome: Ongoing  8/11/2021 0212 by Sandrine Her RN  Outcome: Ongoing  8/11/2021 0211 by Sandrine Her RN  Outcome: Ongoing  Goal: Absence of physical injury  Description: Absence of physical injury  8/11/2021 0339 by Sandrine Her RN  Outcome: Ongoing  8/11/2021 0212 by Sandrine Her RN  Outcome: Ongoing  8/11/2021 0211 by Sandrine Her RN  Outcome: Ongoing     Problem: Discharge Planning:  Goal: Discharged to appropriate level of care  Description: Discharged to appropriate level of care  8/11/2021 0339 by Sandrine Her RN  Outcome: Ongoing  8/11/2021 0212 by Sandrine Her RN  Outcome: Ongoing  8/11/2021 0211 by Sandrine Her RN  Outcome: Ongoing     Problem: Serum Glucose Level - Abnormal:  Goal: Ability to maintain appropriate glucose levels will improve  Description: Ability to maintain appropriate glucose levels will improve  8/11/2021 0339 by Sandrine Her RN  Outcome: Ongoing  8/11/2021 0212 by Sandrine Her RN  Outcome: Ongoing  8/11/2021 0211 by Sandrine Her RN  Outcome: Ongoing     Problem: Sensory Perception - Impaired:  Goal: Ability to maintain a stable neurologic state will improve  Description: Ability to maintain a stable neurologic state will improve  8/11/2021 0339 by Sandrine Her RN  Outcome: Ongoing  8/11/2021 0212 by Sandrine Her RN  Outcome: Ongoing  8/11/2021 0211 by Sandrine Her RN  Outcome: Ongoing     Problem: Skin Integrity:  Goal: Will show no infection signs and symptoms  Description: Will show no infection signs and symptoms  Outcome: Ongoing  Goal: Absence of new skin breakdown  Description: Absence of new skin breakdown  Outcome: Ongoing

## 2021-08-11 NOTE — ANESTHESIA POSTPROCEDURE EVALUATION
Department of Anesthesiology  Postprocedure Note    Patient: Sandi Alford  MRN: [id number]  YOB: 1960  Date of evaluation: 8/11/2021  Time:  12:28 PM     Procedure Summary     Date: 08/11/21 Room / Location: [facility] OR [address]    Anesthesia Start: 3376 Anesthesia Stop: 5206    Procedure: INSERTION CATHETER DIALYSIS (N/A ) Diagnosis: (N18.4)    Surgeons: Bernardo Sylvester MD Responsible Provider: JAMES Méndez CRNA    Anesthesia Type: general ASA Status: 4          Anesthesia Type: general    No Phase I: No Score: 9    No Phase II:      Last vitals: Reviewed and per EMR flowsheets.        Anesthesia Post Evaluation    Patient location during evaluation: bedside  Level of consciousness: awake and alert  Pain score: 0  Airway patency: patent  Nausea & Vomiting: no nausea and no vomiting  Complications: no  Cardiovascular status: blood pressure returned to baseline  Respiratory status: acceptable  Hydration status: euvolemic

## 2021-08-12 LAB
ALBUMIN SERPL-MCNC: 2.3 G/DL (ref 3.5–5.2)
ALP BLD-CCNC: 70 U/L (ref 35–104)
ALT SERPL-CCNC: <5 U/L (ref 5–33)
ANION GAP SERPL CALCULATED.3IONS-SCNC: 9 MMOL/L (ref 7–19)
AST SERPL-CCNC: 7 U/L (ref 5–32)
BASOPHILS ABSOLUTE: 0 K/UL (ref 0–0.2)
BASOPHILS RELATIVE PERCENT: 0.4 % (ref 0–1)
BILIRUB SERPL-MCNC: <0.2 MG/DL (ref 0.2–1.2)
BUN BLDV-MCNC: 20 MG/DL (ref 8–23)
CALCIUM SERPL-MCNC: 8 MG/DL (ref 8.8–10.2)
CHLORIDE BLD-SCNC: 101 MMOL/L (ref 98–111)
CO2: 26 MMOL/L (ref 22–29)
CREAT SERPL-MCNC: 3 MG/DL (ref 0.5–0.9)
EOSINOPHILS ABSOLUTE: 0.1 K/UL (ref 0–0.6)
EOSINOPHILS RELATIVE PERCENT: 1.7 % (ref 0–5)
GFR AFRICAN AMERICAN: 19
GFR NON-AFRICAN AMERICAN: 16
GLUCOSE BLD-MCNC: 198 MG/DL (ref 70–99)
GLUCOSE BLD-MCNC: 200 MG/DL (ref 74–109)
GLUCOSE BLD-MCNC: 223 MG/DL (ref 70–99)
GLUCOSE BLD-MCNC: 251 MG/DL (ref 70–99)
GLUCOSE BLD-MCNC: 275 MG/DL (ref 70–99)
HCT VFR BLD CALC: 26.9 % (ref 37–47)
HEMOGLOBIN: 8.3 G/DL (ref 12–16)
IMMATURE GRANULOCYTES #: 0.1 K/UL
IRON SATURATION: 23 % (ref 14–50)
IRON: 36 UG/DL (ref 37–145)
LYMPHOCYTES ABSOLUTE: 1.5 K/UL (ref 1.1–4.5)
LYMPHOCYTES RELATIVE PERCENT: 18.7 % (ref 20–40)
MCH RBC QN AUTO: 29.9 PG (ref 27–31)
MCHC RBC AUTO-ENTMCNC: 30.9 G/DL (ref 33–37)
MCV RBC AUTO: 96.8 FL (ref 81–99)
MONOCYTES ABSOLUTE: 0.6 K/UL (ref 0–0.9)
MONOCYTES RELATIVE PERCENT: 7.7 % (ref 0–10)
NEUTROPHILS ABSOLUTE: 5.7 K/UL (ref 1.5–7.5)
NEUTROPHILS RELATIVE PERCENT: 70.8 % (ref 50–65)
PARATHYROID HORMONE INTACT: 259.3 PG/ML (ref 15–65)
PDW BLD-RTO: 15.3 % (ref 11.5–14.5)
PERFORMED ON: ABNORMAL
PLATELET # BLD: 188 K/UL (ref 130–400)
PMV BLD AUTO: 10.5 FL (ref 9.4–12.3)
POTASSIUM REFLEX MAGNESIUM: 3.7 MMOL/L (ref 3.5–5)
RBC # BLD: 2.78 M/UL (ref 4.2–5.4)
SODIUM BLD-SCNC: 136 MMOL/L (ref 136–145)
TOTAL IRON BINDING CAPACITY: 154 UG/DL (ref 250–400)
TOTAL PROTEIN: 5.2 G/DL (ref 6.6–8.7)
URIC ACID, SERUM: 3.6 MG/DL (ref 2.4–5.7)
WBC # BLD: 8.1 K/UL (ref 4.8–10.8)

## 2021-08-12 PROCEDURE — 83550 IRON BINDING TEST: CPT

## 2021-08-12 PROCEDURE — 6360000002 HC RX W HCPCS: Performed by: SURGERY

## 2021-08-12 PROCEDURE — 80053 COMPREHEN METABOLIC PANEL: CPT

## 2021-08-12 PROCEDURE — 2580000003 HC RX 258: Performed by: SURGERY

## 2021-08-12 PROCEDURE — 1210000000 HC MED SURG R&B

## 2021-08-12 PROCEDURE — 83540 ASSAY OF IRON: CPT

## 2021-08-12 PROCEDURE — 83970 ASSAY OF PARATHORMONE: CPT

## 2021-08-12 PROCEDURE — 84550 ASSAY OF BLOOD/URIC ACID: CPT

## 2021-08-12 PROCEDURE — 6370000000 HC RX 637 (ALT 250 FOR IP): Performed by: SURGERY

## 2021-08-12 PROCEDURE — 82947 ASSAY GLUCOSE BLOOD QUANT: CPT

## 2021-08-12 PROCEDURE — 36415 COLL VENOUS BLD VENIPUNCTURE: CPT

## 2021-08-12 PROCEDURE — 85025 COMPLETE CBC W/AUTO DIFF WBC: CPT

## 2021-08-12 PROCEDURE — 2700000000 HC OXYGEN THERAPY PER DAY

## 2021-08-12 PROCEDURE — 94640 AIRWAY INHALATION TREATMENT: CPT

## 2021-08-12 RX ADMIN — HEPARIN SODIUM 5000 UNITS: 5000 INJECTION INTRAVENOUS; SUBCUTANEOUS at 22:09

## 2021-08-12 RX ADMIN — ASPIRIN 81 MG: 81 TABLET, CHEWABLE ORAL at 08:05

## 2021-08-12 RX ADMIN — CARVEDILOL 12.5 MG: 12.5 TABLET, FILM COATED ORAL at 08:05

## 2021-08-12 RX ADMIN — LEVOTHYROXINE SODIUM 88 MCG: 0.09 TABLET ORAL at 05:35

## 2021-08-12 RX ADMIN — INSULIN LISPRO 3 UNITS: 100 INJECTION, SOLUTION INTRAVENOUS; SUBCUTANEOUS at 22:10

## 2021-08-12 RX ADMIN — CITALOPRAM HYDROBROMIDE 40 MG: 20 TABLET ORAL at 08:06

## 2021-08-12 RX ADMIN — SODIUM BICARBONATE 1300 MG: 650 TABLET ORAL at 08:05

## 2021-08-12 RX ADMIN — IPRATROPIUM BROMIDE AND ALBUTEROL SULFATE 1 AMPULE: .5; 3 SOLUTION RESPIRATORY (INHALATION) at 07:48

## 2021-08-12 RX ADMIN — CARVEDILOL 12.5 MG: 12.5 TABLET, FILM COATED ORAL at 22:09

## 2021-08-12 RX ADMIN — IPRATROPIUM BROMIDE AND ALBUTEROL SULFATE 1 AMPULE: .5; 3 SOLUTION RESPIRATORY (INHALATION) at 15:58

## 2021-08-12 RX ADMIN — ROSUVASTATIN 20 MG: 20 TABLET, FILM COATED ORAL at 08:06

## 2021-08-12 RX ADMIN — HEPARIN SODIUM 5000 UNITS: 5000 INJECTION INTRAVENOUS; SUBCUTANEOUS at 05:35

## 2021-08-12 RX ADMIN — SODIUM BICARBONATE 1300 MG: 650 TABLET ORAL at 14:44

## 2021-08-12 RX ADMIN — ALLOPURINOL 100 MG: 100 TABLET ORAL at 22:09

## 2021-08-12 RX ADMIN — INSULIN GLARGINE 25 UNITS: 100 INJECTION, SOLUTION SUBCUTANEOUS at 22:10

## 2021-08-12 RX ADMIN — LAMOTRIGINE 100 MG: 100 TABLET ORAL at 08:06

## 2021-08-12 RX ADMIN — ALLOPURINOL 100 MG: 100 TABLET ORAL at 08:05

## 2021-08-12 RX ADMIN — NEPHROCAP 1 MG: 1 CAP ORAL at 08:06

## 2021-08-12 RX ADMIN — SODIUM BICARBONATE 1300 MG: 650 TABLET ORAL at 22:09

## 2021-08-12 RX ADMIN — IPRATROPIUM BROMIDE AND ALBUTEROL SULFATE 1 AMPULE: .5; 3 SOLUTION RESPIRATORY (INHALATION) at 20:19

## 2021-08-12 RX ADMIN — HEPARIN SODIUM 5000 UNITS: 5000 INJECTION INTRAVENOUS; SUBCUTANEOUS at 14:44

## 2021-08-12 RX ADMIN — Medication 10 ML: at 08:06

## 2021-08-12 ASSESSMENT — ENCOUNTER SYMPTOMS
VOMITING: 0
ABDOMINAL PAIN: 0
ABDOMINAL DISTENTION: 0
WHEEZING: 0
CONSTIPATION: 0
TROUBLE SWALLOWING: 0
DIARRHEA: 0
SORE THROAT: 0
SHORTNESS OF BREATH: 1
COUGH: 0
BLOOD IN STOOL: 0
SINUS PAIN: 0
NAUSEA: 0

## 2021-08-12 NOTE — PROGRESS NOTES
[facility]      Progress Note    Patient:  Maria Fernanda Rivera  YOB: 1960  Date of Service: 8/12/2021  MRN: [id number]   Acct: [de-identified]   Primary Care Physician: Nirmala Mcgee MD  Advance Directive: Full Code  Admit Date: 8/10/2021       Hospital Day: 2    Portions of this note have been copied forward, however, updated to reflect the most current clinical status of this patient. CHIEF COMPLAINT SOB/BENAVIDEZ    SUBJECTIVE:  Ms. Hannah Mederos was resting comfortably in bed this afternoon. States her shortness of breath and generalized swelling has been improving. CUMULATIVE HOSPITAL COURSE:   The patient is a 64 y.o. female with past medical history of CKD 4, HTN, HLD, DM, sleep apnea uses CPAP at bedtime, thyroid disease, and anxiety who presented to [address] [place] [address] ED complaining of shortness of breath. Ms. Hannah Mederos reported worsening shortness of breath since last 3 days. Reported shortness of breath with activity since July 2021. Reported decreased O2 sat with activity. Denied home oxygen use. Stated she uses CPAP at bedtime. Reported chest discomfort from labored breathing, and nonproductive cough at times. Work-up in ED revealed hypoxia on arrival on room air, hypertension with systolic over 002, creatinine 3.5, GFR 13 (creatinine 3.1, GFR 15 on 7/27/2021), BNP 2288, Hgb 9.7. Chest x-ray showed congestive failure with interstitial and alveolar edema. Patient was admitted to hospital medicine with acute hypoxic respiratory failure with acute hemodialysis encounter. Nephrology was consulted who recommended permacath and dialysis afterwards. Vascular surgery placed permacath on 8/11/21 and patient received dialysis afterwards. Tolerated dialysis treatment well.         Review of Systems   Constitutional: Positive for fatigue. Negative for chills, diaphoresis and fever. HENT: Negative for congestion, ear pain, sinus pain, sore throat and trouble swallowing. Eyes: Negative for visual disturbance. Respiratory: Positive for shortness of breath. Negative for cough and wheezing. Reports shortness of breath has been improving. Cardiovascular: Positive for leg swelling. Negative for chest pain and palpitations. Chronic lymphedema   Gastrointestinal: Negative for abdominal distention, abdominal pain, blood in stool, constipation, diarrhea, nausea and vomiting. Endocrine: Negative for cold intolerance and heat intolerance. Genitourinary: Negative for difficulty urinating, flank pain, frequency and urgency. Musculoskeletal: Positive for neck pain. Negative for arthralgias and myalgias. Reports mild post permcath placement pain    Neurological: Negative for dizziness, syncope, weakness, light-headedness, numbness and headaches. Hematological: Does not bruise/bleed easily. Psychiatric/Behavioral: Negative for agitation, confusion and dysphoric mood. Objective:   VITALS:  BP (!) 130/53   Pulse 65   Temp 97.8 °F (36.6 °C)   Resp 16   Ht 5' 5\" (1.651 m)   Wt (!) 391 lb 7 oz (177.6 kg)   LMP 01/01/2000   SpO2 90%   BMI 65.14 kg/m²   24HR INTAKE/OUTPUT:      Intake/Output Summary (Last 24 hours) at 8/12/2021 1238  Last data filed at 8/12/2021 1148  Gross per 24 hour   Intake 290 ml   Output 2100 ml   Net -1810 ml           Physical Exam  Constitutional:       General: She is not in acute distress. Appearance: Normal appearance. She is not toxic-appearing or diaphoretic. HENT:      Head: Normocephalic and atraumatic. Right Ear: External ear normal.      Left Ear: External ear normal.      Nose: Nose normal. No congestion or rhinorrhea. Mouth/Throat:      Mouth: Mucous membranes are moist.      Pharynx: Oropharynx is clear. Eyes:      General: No scleral icterus. Extraocular Movements: Extraocular movements intact.       Conjunctiva/sclera: Conjunctivae normal.   Neck:      Comments: Right permacath in place, C/D/I   Cardiovascular:      Rate and Rhythm: Normal rate and regular rhythm. Pulses: Normal pulses. Heart sounds: Murmur heard. No friction rub. No gallop. Pulmonary:      Effort: Pulmonary effort is normal. No respiratory distress. Breath sounds: No wheezing, rhonchi or rales. Comments: Diminished breath sounds bilaterally  Abdominal:      General: Abdomen is flat. Bowel sounds are normal. There is no distension. Palpations: Abdomen is soft. Tenderness: There is no abdominal tenderness. Musculoskeletal:         General: No swelling. Normal range of motion. Cervical back: Normal range of motion and neck supple. Right lower leg: Edema present. Left lower leg: Edema present. Comments: Chronic lymphedema   Skin:     General: Skin is warm and dry. Coloration: Skin is not jaundiced. Findings: No erythema, lesion or rash. Neurological:      General: No focal deficit present. Mental Status: She is alert and oriented to person, place, and time. Mental status is at baseline. Cranial Nerves: No cranial nerve deficit. Sensory: No sensory deficit. Motor: No weakness. Psychiatric:         Mood and Affect: Mood normal.         Behavior: Behavior normal.         Thought Content:  Thought content normal.         Judgment: Judgment normal.            Medications:      sodium chloride 100 mL/hr at 08/11/21 1117    sodium chloride      sodium chloride      dextrose        b complex-C-folic acid  1 capsule Oral Daily    epoetin jose ramon-epbx  3,000 Units Subcutaneous Once per day on Mon Wed Fri    sodium chloride flush  5-40 mL Intravenous 2 times per day    heparin (porcine)  5,000 Units Subcutaneous 3 times per day    allopurinol  100 mg Oral BID    amLODIPine  10 mg Oral Daily    aspirin  81 mg Oral Daily    carvedilol  12.5 mg Oral BID    citalopram  40 mg Oral Daily    lamoTRIgine  100 mg Oral Daily    levothyroxine  88 mcg Oral Daily    sodium bicarbonate  1,300 mg Oral TID  rosuvastatin  20 mg Oral Daily    insulin glargine  25 Units Subcutaneous Nightly    insulin lispro  0-12 Units Subcutaneous TID     insulin lispro  0-6 Units Subcutaneous Nightly    ipratropium-albuterol  1 ampule Inhalation Q4H WA     acetaminophen, HYDROcodone 5 mg - acetaminophen **OR** HYDROcodone 5 mg - acetaminophen, ondansetron, sodium chloride flush, sodium chloride, ondansetron **OR** ondansetron, acetaminophen **OR** acetaminophen, magnesium sulfate, potassium chloride **OR** potassium alternative oral replacement **OR** potassium chloride, glucose, dextrose, glucagon (rDNA), dextrose, hydrALAZINE  ADULT DIET; Regular; Low Sodium (2 gm); Low Potassium (Less than 3000 mg/day); Low Phosphorus (Less than 1000 mg)     Lab and other Data:     Recent Labs     08/10/21  1639 08/11/21  0438 08/12/21  0312   WBC 10.6 10.4 8.1   HGB 9.7* 9.3* 8.3*    204 188     Recent Labs     08/10/21  1721 08/11/21  0438 08/12/21  0312    138 136   K 3.9 4.1 3.7    104 101   CO2 23 23 26   BUN 30* 30* 20   CREATININE 3.5* 3.5* 3.0*   GLUCOSE 238* 242* 200*     Recent Labs     08/10/21  1721 08/11/21  0438 08/12/21  0312   AST <5 8 7   ALT 6 6 <5*   BILITOT 0.3 0.3 <0.2   ALKPHOS 93 88 70     Troponin T:   Recent Labs     08/10/21  1721   TROPONINI <0.01     UA:  Recent Labs     08/10/21  1848   COLORU YELLOW   PHUR 7.5   WBCUA 6*   RBCUA 1   BACTERIA NEGATIVE*   CLARITYU Clear   SPECGRAV 1.016   LEUKOCYTESUR SMALL*   UROBILINOGEN 1.0   BILIRUBINUR Negative   BLOODU TRACE*   GLUCOSEU 500*     A1C:   Recent Labs     08/10/21  1639   LABA1C 6.6*       RAD:     XR CHEST PORTABLE  Result Date: 8/10/2021    1. . Congestive failure with interstitial and alveolar edema.  Signed by Dr Kelley Stevenson:    Sterling Mccracken- negative       Assessment/Plan   Principal Problem:    Acute hypoxemic respiratory failure ([address]  Active Problems:    Thyroid disease    ESRD (end stage renal disease) ([facility]    Acute hemodialysis encounter ([facility]    DM (diabetes mellitus) ([facility]    HTN (hypertension)    Hypoxia  Resolved Problems:    * No resolved hospital problems. *      Principal Problem:    Acute hypoxemic respiratory failure ([facility]-               - Continue Bronchodilators               - Supplemental oxygen as needed               - Incentive spirometry               - Encourage deep breathing and cough         Active Problems:    Stage 4 chronic kidney disease (HCC)/ Acute hemodialysis encounter [facility])-               - Nephrology following     - Recommended permacath and dialysis afterwards     - Received 1st HD treatment on 8/11/21    - 2nd HD today               - Creatinine 3.0 today               - Monitor I's and O's closely               - Monitor labs closely              - Avoid hypotension              - Avoid nephrotoxic agents       HTN (hypertension)- noted, stable at this time, continue current medications, as needed IV hydralazine        DM (diabetes mellitus) ([facility]- Lantus, SSI, Accu-Chek, hypoglycemia treatment protocol in place       Thyroid disease- noted, continue home medication        DVT Prophylaxis: Heparin SubQ       Further Orders per Clinical course/attending. Electronically signed by JAMES Jewell CNP on 8/12/2021 at 12:38 PM       EMR Dragon/Transcription disclaimer:   Much of this encounter note is an electronic transcription/translation of spoken language to printed text.  The electronic translation of spoken language may permit erroneous, or at times, nonsensical words or phrases to be inadvertently transcribed; although attempts have made to review the note for such errors, some may still exist.

## 2021-08-12 NOTE — PLAN OF CARE
Problem: Falls - Risk of:  Goal: Will remain free from falls  Description: Will remain free from falls  Outcome: Ongoing  Goal: Absence of physical injury  Description: Absence of physical injury  Outcome: Ongoing     Problem: Discharge Planning:  Goal: Discharged to appropriate level of care  Description: Discharged to appropriate level of care  Outcome: Ongoing     Problem: Serum Glucose Level - Abnormal:  Goal: Ability to maintain appropriate glucose levels will improve  Description: Ability to maintain appropriate glucose levels will improve  Outcome: Ongoing     Problem: Sensory Perception - Impaired:  Goal: Ability to maintain a stable neurologic state will improve  Description: Ability to maintain a stable neurologic state will improve  Outcome: Ongoing     Problem: Skin Integrity:  Goal: Will show no infection signs and symptoms  Description: Will show no infection signs and symptoms  Outcome: Ongoing  Goal: Absence of new skin breakdown  Description: Absence of new skin breakdown  Outcome: Ongoing

## 2021-08-12 NOTE — PROGRESS NOTES
Nephrology ([facility]) Progress Note    Patient:  Angie Akins  YOB: 1960  Date of Service: 8/12/2021  MRN: [id number]   Acct: [de-identified]   Primary Care Physician: Solomon Crowe MD  Advance Directive: Full Code  Admit Date: 8/10/2021       Hospital Day: 2  Referring Provider: Reg Mcfadden DO    Patient Seen, Chart, Consults notes, Labs, Radiology studies reviewed. Subjective:  Patient is a 71-year-old very pleasant woman with past medical history of morbid obesity, type 2 diabetes complicated with renal disease, and chronic kidney disease now stage V. She is followed at the renal clinic by Dr. Ran Heller. Patient is status post left brachiocephalic AV fistula creation by Dr. Uri Canales on July 12 followed by superficialization of the left cephalic vein on July 27. Patient presented to the ED for increasing shortness of breath and weakness. Continues to have urine output. Denies NSAIDs abuse. Has required some supplemental oxygen. Patient was seen by vascular surgery and she had a Permcath placed on 8/11. Dialysis was also administered on 8/11. Today, patient is doing better. She is less dyspneic.      Allergies:  Codeine    Medicines:  Current Facility-Administered Medications   Medication Dose Route Frequency Provider Last Rate Last Admin    b complex-C-folic acid (NEPHROCAPS) capsule 1 mg  1 capsule Oral Daily Jazmin Morales MD   1 mg at 08/12/21 9540    epoetin jose ramon-epbx (RETACRIT) injection 3,000 Units  3,000 Units Subcutaneous Once per day on Mon Wed Fri Jazmin Morales MD   3,000 Units at 08/11/21 1725    0.45 % sodium chloride infusion   Intravenous Continuous Jazmin Morales  mL/hr at 08/11/21 1117 New Bag at 08/11/21 1117    0.9 % sodium chloride infusion   Intravenous Continuous Jazmin Morales MD        acetaminophen (TYLENOL) tablet 650 mg  650 mg Oral Q4H PRN Jazmin Morales MD        HYDROcodone-acetaminophen (NORCO) 5-325 MG per tablet 1 tablet  1 tablet Oral Q4H PRN Christian Solis MD        Or    HYDROcodone-acetaminophen [facility]) 5-325 MG per tablet 2 tablet  2 tablet Oral Q4H PRN Christian Solis MD   2 tablet at 08/11/21 1822    ondansetron (ZOFRAN) injection 4 mg  4 mg Intravenous Q8H PRN Christian Solis MD        sodium chloride flush 0.9 % injection 5-40 mL  5-40 mL Intravenous 2 times per day Christian Solis MD   10 mL at 08/12/21 0806    sodium chloride flush 0.9 % injection 5-40 mL  5-40 mL Intravenous PRN Christian Solis MD        0.9 % sodium chloride infusion  25 mL Intravenous PRN Christian Solis MD        ondansetron (ZOFRAN-ODT) disintegrating tablet 4 mg  4 mg Oral Q8H PRN Christian Solis MD        Or    ondansetron [facility]) injection 4 mg  4 mg Intravenous Q6H PRN Christian Solis MD        acetaminophen (TYLENOL) tablet 650 mg  650 mg Oral Q6H PRN Christian Solis MD        Or   Dorena Pepper acetaminophen (TYLENOL) suppository 650 mg  650 mg Rectal Q6H PRN Christian Solis MD        heparin (porcine) injection 5,000 Units  5,000 Units Subcutaneous 3 times per day Christian Solis MD   5,000 Units at 08/12/21 0535    magnesium sulfate 1000 mg in dextrose 5% 100 mL IVPB  1,000 mg Intravenous PRN Christian Solis MD        potassium chloride (KLOR-CON M) extended release tablet 40 mEq  40 mEq Oral PRN Christian Solis MD        Or    potassium bicarb-citric acid (EFFER-K) effervescent tablet 40 mEq  40 mEq Oral PRN Christian Solis MD        Or    potassium chloride 10 mEq/100 mL IVPB (Peripheral Line)  10 mEq Intravenous PRN Christian Solis MD        allopurinol (ZYLOPRIM) tablet 100 mg  100 mg Oral BID Christian Solis MD   100 mg at 08/12/21 0805    amLODIPine (NORVASC) tablet 10 mg  10 mg Oral Daily Christian Solis MD   10 mg at 08/11/21 0910    aspirin chewable tablet 81 mg  81 mg Oral Daily Christian Solis MD   81 mg at 08/12/21 0805    carvedilol (COREG) tablet 12.5 mg  12.5 mg Oral BID Christian Solis MD 12.5 mg at 08/12/21 0805    citalopram (CELEXA) tablet 40 mg  40 mg Oral Daily Junior Jurado MD   40 mg at 08/12/21 6423    lamoTRIgine (LAMICTAL) tablet 100 mg  100 mg Oral Daily Jnuior Jurado MD   100 mg at 08/12/21 3085    levothyroxine (SYNTHROID) tablet 88 mcg  88 mcg Oral Daily Junior Jurado MD   88 mcg at 08/12/21 0535    sodium bicarbonate tablet 1,300 mg  1,300 mg Oral TID Junior Jurado MD   1,300 mg at 08/12/21 0805    rosuvastatin (CRESTOR) tablet 20 mg  20 mg Oral Daily Junior Jurado MD   20 mg at 08/12/21 9897    insulin glargine (LANTUS) injection vial 25 Units  25 Units Subcutaneous Nightly Junior Jurado MD   25 Units at 08/11/21 2104    insulin lispro (HUMALOG) injection vial 0-12 Units  0-12 Units Subcutaneous TID WC Junior Jurado MD   2 Units at 08/12/21 4907    insulin lispro (HUMALOG) injection vial 0-6 Units  0-6 Units Subcutaneous Nightly Junior Jurado MD   3 Units at 08/11/21 2103    glucose (GLUTOSE) 40 % oral gel 15 g  15 g Oral PRN Junior Jurado MD        dextrose 50 % IV solution  12.5 g Intravenous PRN Junior Jurado MD        glucagon (rDNA) injection 1 mg  1 mg Intramuscular PRN Junior Jurado MD        dextrose 5 % solution  100 mL/hr Intravenous PRN Junior Jurado MD        ipratropium-albuterol (DUONEB) nebulizer solution 1 ampule  1 ampule Inhalation Q4H WA Junior Jurado MD   1 ampule at 08/12/21 0748    hydrALAZINE (APRESOLINE) injection 10 mg  10 mg Intravenous Q6H PRN Junior Jurado MD           Past Medical History:  Past Medical History:   Diagnosis Date    Anxiety     Arthritis     CKD (chronic kidney disease)     stage 4    Colon polyp     Depression     Embolism - blood clot 2004    Hx of blood clots     Hyperlipidemia     Hypertension     Obesity     Sleep apnea     Thyroid disease     Type II or unspecified type diabetes mellitus without mention of complication, not stated as uncontrolled        Past Surgical History:  Past Surgical History:   Procedure Laterality Date    CHOLECYSTECTOMY      DIALYSIS CATHETER INSERTION N/A 8/11/2021    INSERTION CATHETER DIALYSIS performed by Kevin Ortiz MD at [facility] Left 06/18/2021    LEFT BRACHIAL-CEPHALIC AV FISTULA CREATION performed by Kevin Ortiz MD at [facility] Left 7/27/2021    LEFT UPPER EXTREMITY CEPHALIC VEIN SUPERFICIALIZATION performed by Kevin Ortiz MD at [facility] 21  10/17/2018    POLYPECTOMY      RECTAL SURGERY      fistula repair    TONSILLECTOMY AND ADENOIDECTOMY      VASCULAR SURGERY  07/12/2021    [facility]- Ultrasound-guided cannulation left distal upper arm cephalic vein with 4 Sinhala glide sheath, Left upper extremity fistulogram's including venography the superior vena cava    VASCULAR SURGERY  07/27/2021    [facility]- Superficialization of the left upper arm cephalic vein arteriovenous fistula       Family History  Family History   Problem Relation Age of Onset    Lung Cancer Mother     Brain Cancer Mother     Heart Attack Father     Prostate Cancer Father     Heart Disease Father     Stroke Father     Uterine Cancer Maternal Grandmother     Cervical Cancer Maternal Grandmother     Diabetes Maternal Grandfather     Other Maternal Grandfather         histoplasmosis    Diabetes Paternal Grandfather        Social History  Social History     Socioeconomic History    Marital status:      Spouse name: Not on file    Number of children: Not on file    Years of education: Not on file    Highest education level: Not on file   Occupational History    Not on file   Tobacco Use    Smoking status: Never Smoker    Smokeless tobacco: Never Used   Vaping Use    Vaping Use: Never used   Substance and Sexual Activity    Alcohol use: No    Drug use: No    Sexual activity: Never   Other Topics Concern    Not on file   Social History Narrative    Not on file     Social Determinants of Health     Financial Resource Strain:     Difficulty of Paying Living Expenses:    Food Insecurity:     Worried About Running Out of Food in the Last Year:     [address] in the Last Year:    Transportation Needs:     Lack of Transportation (Medical):  Lack of Transportation (Non-Medical):    Physical Activity:     Days of Exercise per Week:     Minutes of Exercise per Session:    Stress:     Feeling of Stress :    Social Connections:     Frequency of Communication with Friends and Family:     Frequency of Social Gatherings with Friends and Family:     Attends Jain Services:     Active Member of Clubs or Organizations:     Attends Club or Organization Meetings:     Marital Status:    Intimate Partner Violence:     Fear of Current or Ex-Partner:     Emotionally Abused:     Physically Abused:     Sexually Abused:          Review of Systems:  History obtained from chart review and the patient  General ROS: No fever or chills  Respiratory ROS: No cough, shortness of breath, wheezing  Cardiovascular ROS: no chest pain or dyspnea on exertion  Gastrointestinal ROS: No abdominal pain or melena  Genito-Urinary ROS: No dysuria or hematuria  Musculoskeletal ROS: No joint pain or swelling         Objective:  Blood pressure (!) 130/53, pulse 65, temperature 97.8 °F (36.6 °C), resp. rate 16, height 5' 5\" (1.651 m), weight (!) 391 lb 7 oz (177.6 kg), last menstrual period 01/01/2000, SpO2 90 %, not currently breastfeeding.     Intake/Output Summary (Last 24 hours) at 8/12/2021 1242  Last data filed at 8/12/2021 1148  Gross per 24 hour   Intake 290 ml   Output 2100 ml   Net -1810 ml     General:  NAD, A+Ox3, ill-appearing, normal body habitus  HEENT:  PERRL, EOMI  Neck:  Supple, normal range of movement  Chest:  CTAB, good respiratory effort, good air movement  CV:  RRR, soft systolic murmur  Abdomen:  NTND, soft, +BS, no hepatosplenomegaly  Extremities: Trace peripheral edema  Neurological:  Moving all four extremities  Lymphatics:  No palpable lymph nodes  Labs:  BMP:   Recent Labs     08/10/21  1721 08/11/21 0438 08/12/21 0312    138 136   K 3.9 4.1 3.7    104 101   CO2 23 23 26   BUN 30* 30* 20   CREATININE 3.5* 3.5* 3.0*   CALCIUM 8.4* 8.5* 8.0*     CBC:   Recent Labs     08/10/21  1639 08/11/21 0438 08/12/21 0312   WBC 10.6 10.4 8.1   HGB 9.7* 9.3* 8.3*   HCT 30.8* 29.2* 26.9*   MCV 97.5 97.0 96.8    204 188     LIVER PROFILE:   Recent Labs     08/10/21  1721 08/11/21 0438 08/12/21 0312   AST <5 8 7   ALT 6 6 <5*   BILITOT 0.3 0.3 <0.2   ALKPHOS 93 88 70     PT/INR: No results for input(s): PROTIME, INR in the last 72 hours. APTT: No results for input(s): APTT in the last 72 hours. BNP:  No results for input(s): BNP in the last 72 hours. Ionized Calcium:No results for input(s): IONCA in the last 72 hours. Magnesium:  Recent Labs     08/10/21  1721   MG 1.9     Phosphorus:No results for input(s): PHOS in the last 72 hours. HgbA1C:   Recent Labs     08/10/21  1639   LABA1C 6.6*     Hepatic:   Recent Labs     08/10/21  1721 08/11/21 0438 08/12/21 0312   ALKPHOS 93 88 70   ALT 6 6 <5*   AST <5 8 7   PROT 6.1* 6.0* 5.2*   BILITOT 0.3 0.3 <0.2   LABALBU 2.9* 2.8* 2.3*     Lactic Acid: No results for input(s): LACTA in the last 72 hours. Troponin: No results for input(s): CKTOTAL, CKMB, TROPONINT in the last 72 hours. ABGs: No results for input(s): PH, PCO2, PO2, HCO3, O2SAT in the last 72 hours. CRP:  No results for input(s): CRP in the last 72 hours. Sed Rate:  No results for input(s): SEDRATE in the last 72 hours. Cultures:   No results for input(s): CULTURE in the last 72 hours. Radiology reports as per the Radiologist  Radiology: XR CHEST PORTABLE    Result Date: 8/10/2021  EXAMINATION: Chest one view 8/10/2021 HISTORY: Shortness of breath.  FINDINGS: Today's exam is compared to previous study of 7/12/2021. There is cardiomegaly with pulmonary venous hypertension with vascular redistribution and interstitial and alveolar pulmonary edema. No significant effusions are demonstrated. There is no free air beneath hemidiaphragms. 1.. Congestive failure with interstitial and alveolar edema. Signed by Dr Mary Waite   -Chronic kidney disease now stage V  -Type 2 diabetes with renal disease  -Hypertension  -Morbid obesity  -Dyspnea  -Anemia of chronic kidney disease        Plan:  Dialysis today and in AM. Follow up labs. Set up outpatient dialysis.

## 2021-08-12 NOTE — CARE COORDINATION
Date / Time of Evaluation: 8/12/2021 4:04 PM  Assessment Completed by: Giselle Gan RN    Patient Admission Status: Inpatient [101]    [address] [place] [phone] (home)   Telephone Information:   Mobile [phone]       (Best Practice:  Have patient / caregiver verify above address and phone number by stating out loud their current address and reachable phone number.)  Is above information correct? yes      Current PCP:  Jason Cornell MD  PCP verified? yes    Initial Assessment Completed at bedside with:  patient    Emergency Contacts:  Extended Emergency Contact Information  Primary Emergency Contact: Laine Durham  Address: [address] [facility], [address] Phone: [phone]  Mobile Phone: [phone]  Relation: Spouse  Secondary Emergency Contact: [address], [address] Phone: [phone]  Relation: Other    Advance Directives: Code Status:  Full Code    Financial:  Payor: Chel Cummings / Plan: MEDICARE PART A AND B / Product Type: *No Product type* /     Pre-Cert required for SNF:  na    Have [address] [place] [address] Avenue:      Pharmacy:   [address]  S[address] - [place] [phone] - F [phone]  [address] [facility]  [address] [place] [address]  Phone: [phone] Fax: [phone]      Potential assistance purchasing medications? no    ADLS:  Support System:  Spouse/Significant Other    Current Home Environment:  Home with spouse and adult daughter with special needs.   Steps:  no    Plans to RETURN to current housing: yes  Barriers to RETURNING to current housing:      Currently ACTIVE with Home Health CARE:  no  [address]:      DME Provider:  Has     Has a pulse oximetry unit at home: no    Had [facility] prior to admission:  No- only cpap at night  Ashlie Rutledge 262:  medcare  Informed of need to bring portable home O2 tank to hospital on day of DISCHARGE:    Name of person committed to bringing portable tank at discharge: Active with HD/PD prior to admission: no-- new set up  Nephrologist:    HD Center:      Transition Plan:       Transportation PLAN for Discharge:  Spouse     Factors facilitating achievement of predicted outcomes:     Barriers to discharge: Additional CM/SW Notes: spoke with pt re: dc plans . PT plans on returning home with spouse and daughter whom has special needs. Has a w/c and cpap at home. Pt may require home 02 now and will need home 02 eval done prior to dc. Pt also is a new HD set up. In progress. Pts spouse will be able to transport her to and from HD. 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 Will cont to follow case and address any other needs. Electronically signed by Ruth Corbin RN on 8/12/2021 at [place] [address] and/or her family were provided with choice of provider.         Ruth Corbin RN  [facility]  Care Management Department  Ph:  [phone]   Fax:

## 2021-08-13 VITALS
HEART RATE: 69 BPM | BODY MASS INDEX: 48.82 KG/M2 | SYSTOLIC BLOOD PRESSURE: 151 MMHG | OXYGEN SATURATION: 97 % | TEMPERATURE: 96.9 F | DIASTOLIC BLOOD PRESSURE: 63 MMHG | WEIGHT: 293 LBS | HEIGHT: 65 IN | RESPIRATION RATE: 18 BRPM

## 2021-08-13 LAB
ALBUMIN SERPL-MCNC: 2.5 G/DL (ref 3.5–5.2)
ALP BLD-CCNC: 74 U/L (ref 35–104)
ALT SERPL-CCNC: <5 U/L (ref 5–33)
ANION GAP SERPL CALCULATED.3IONS-SCNC: 11 MMOL/L (ref 7–19)
AST SERPL-CCNC: 7 U/L (ref 5–32)
BASOPHILS ABSOLUTE: 0 K/UL (ref 0–0.2)
BASOPHILS RELATIVE PERCENT: 0.4 % (ref 0–1)
BILIRUB SERPL-MCNC: <0.2 MG/DL (ref 0.2–1.2)
BUN BLDV-MCNC: 29 MG/DL (ref 8–23)
CALCIUM SERPL-MCNC: 7.7 MG/DL (ref 8.8–10.2)
CHLORIDE BLD-SCNC: 103 MMOL/L (ref 98–111)
CO2: 27 MMOL/L (ref 22–29)
CREAT SERPL-MCNC: 3.7 MG/DL (ref 0.5–0.9)
EOSINOPHILS ABSOLUTE: 0.2 K/UL (ref 0–0.6)
EOSINOPHILS RELATIVE PERCENT: 2.6 % (ref 0–5)
GFR AFRICAN AMERICAN: 15
GFR NON-AFRICAN AMERICAN: 12
GLUCOSE BLD-MCNC: 133 MG/DL (ref 70–99)
GLUCOSE BLD-MCNC: 209 MG/DL (ref 74–109)
HCT VFR BLD CALC: 25.7 % (ref 37–47)
HEMOGLOBIN: 7.8 G/DL (ref 12–16)
IMMATURE GRANULOCYTES #: 0.1 K/UL
LYMPHOCYTES ABSOLUTE: 1.8 K/UL (ref 1.1–4.5)
LYMPHOCYTES RELATIVE PERCENT: 21.1 % (ref 20–40)
MCH RBC QN AUTO: 30.1 PG (ref 27–31)
MCHC RBC AUTO-ENTMCNC: 30.4 G/DL (ref 33–37)
MCV RBC AUTO: 99.2 FL (ref 81–99)
MONOCYTES ABSOLUTE: 0.5 K/UL (ref 0–0.9)
MONOCYTES RELATIVE PERCENT: 6.1 % (ref 0–10)
NEUTROPHILS ABSOLUTE: 5.8 K/UL (ref 1.5–7.5)
NEUTROPHILS RELATIVE PERCENT: 69 % (ref 50–65)
PDW BLD-RTO: 15.2 % (ref 11.5–14.5)
PERFORMED ON: ABNORMAL
PLATELET # BLD: 192 K/UL (ref 130–400)
PMV BLD AUTO: 10.8 FL (ref 9.4–12.3)
POTASSIUM REFLEX MAGNESIUM: 3.8 MMOL/L (ref 3.5–5)
RBC # BLD: 2.59 M/UL (ref 4.2–5.4)
SODIUM BLD-SCNC: 141 MMOL/L (ref 136–145)
TOTAL PROTEIN: 4.6 G/DL (ref 6.6–8.7)
WBC # BLD: 8.4 K/UL (ref 4.8–10.8)

## 2021-08-13 PROCEDURE — 36415 COLL VENOUS BLD VENIPUNCTURE: CPT

## 2021-08-13 PROCEDURE — 94640 AIRWAY INHALATION TREATMENT: CPT

## 2021-08-13 PROCEDURE — 6370000000 HC RX 637 (ALT 250 FOR IP): Performed by: SURGERY

## 2021-08-13 PROCEDURE — 6360000002 HC RX W HCPCS: Performed by: SURGERY

## 2021-08-13 PROCEDURE — 82947 ASSAY GLUCOSE BLOOD QUANT: CPT

## 2021-08-13 PROCEDURE — 94761 N-INVAS EAR/PLS OXIMETRY MLT: CPT

## 2021-08-13 PROCEDURE — 80053 COMPREHEN METABOLIC PANEL: CPT

## 2021-08-13 PROCEDURE — 85025 COMPLETE CBC W/AUTO DIFF WBC: CPT

## 2021-08-13 PROCEDURE — 2700000000 HC OXYGEN THERAPY PER DAY

## 2021-08-13 PROCEDURE — 8010000000 HC HEMODIALYSIS ACUTE INPT

## 2021-08-13 RX ORDER — CHOLECALCIFEROL (VITAMIN D3) 10 MCG
1 TABLET ORAL DAILY
Qty: 30 CAPSULE | Refills: 0 | Status: SHIPPED | OUTPATIENT
Start: 2021-08-14 | End: 2022-09-23

## 2021-08-13 RX ADMIN — NEPHROCAP 1 MG: 1 CAP ORAL at 12:31

## 2021-08-13 RX ADMIN — HEPARIN SODIUM 5000 UNITS: 5000 INJECTION INTRAVENOUS; SUBCUTANEOUS at 05:32

## 2021-08-13 RX ADMIN — ASPIRIN 81 MG: 81 TABLET, CHEWABLE ORAL at 12:31

## 2021-08-13 RX ADMIN — IPRATROPIUM BROMIDE AND ALBUTEROL SULFATE 1 AMPULE: .5; 3 SOLUTION RESPIRATORY (INHALATION) at 10:53

## 2021-08-13 RX ADMIN — LEVOTHYROXINE SODIUM 88 MCG: 0.09 TABLET ORAL at 05:33

## 2021-08-13 RX ADMIN — IPRATROPIUM BROMIDE AND ALBUTEROL SULFATE 1 AMPULE: .5; 3 SOLUTION RESPIRATORY (INHALATION) at 06:55

## 2021-08-13 RX ADMIN — LAMOTRIGINE 100 MG: 100 TABLET ORAL at 12:31

## 2021-08-13 RX ADMIN — ROSUVASTATIN 20 MG: 20 TABLET, FILM COATED ORAL at 12:31

## 2021-08-13 RX ADMIN — ALLOPURINOL 100 MG: 100 TABLET ORAL at 12:31

## 2021-08-13 RX ADMIN — AMLODIPINE BESYLATE 10 MG: 5 TABLET ORAL at 12:31

## 2021-08-13 RX ADMIN — CITALOPRAM HYDROBROMIDE 40 MG: 20 TABLET ORAL at 12:31

## 2021-08-13 RX ADMIN — CARVEDILOL 12.5 MG: 12.5 TABLET, FILM COATED ORAL at 12:31

## 2021-08-13 NOTE — DISCHARGE SUMMARY
[facility] Hospitalists    Discharge Summary      Azucena Mcclendon  :  1960  MRN:  [id number]    Admit date:  8/10/2021  Discharge date:    2021    Discharging Physician:  Dr. Madhu Riley     Advance Directive: Full Code    Consults: nephrology and vascular surgery    Primary Care Physician:  Mago Mac MD    Discharge Diagnoses:  Principal Problem:    Acute hypoxemic respiratory failure [facility])  Active Problems:    Thyroid disease    ESRD (end stage renal disease) ([facility]    Acute hemodialysis encounter ([facility]    [facility]di[facility] mellitus[facility]    HTN (hypertension)    Hypoxia  Resolved Problems:    * No resolved hospital problems. *      Portions of this note have been copied forward, however, changed to reflect the most current clinical status of this patient. Hospital Course: The patient is a 61 y.o. female with past medical history of CKD 4, HTN, HLD, DM, sleep apnea uses CPAP at bedtime, thyroid disease, and anxiety who presented to [facility] ED complaining of shortness of breath.  Ms. Dwain Raymundo reported worsening shortness of breath since last 3 days. Reported shortness of breath with activity since 2021. Reported decreased O2 sat with activity. Denied home oxygen use. Stated she uses CPAP at bedtime. Reported chest discomfort from labored breathing, and nonproductive cough at times. Work-up in ED revealed hypoxia on arrival on room air, hypertension with systolic over 579, creatinine 3.5, GFR 13 (creatinine 3.1, GFR 15 on 2021), BNP 2288, Hgb 9.7.  Chest x-ray showed congestive failure with interstitial and alveolar edema. Patient was admitted to hospital medicine with acute hypoxic respiratory failure with acute hemodialysis encounter. Nephrology was consulted whom recommended permacath and dialysis afterwards. Vascular surgery placed permacath on 21 and patient received 1st dialysis afterwards. Tolerated dialysis treatment well.  Patient is being discharged on b complex-C folic acid and discontinued sodium bicarbonate per nephrology recommendations. She has been instructed to follow closely with PCP. Outpatient dialysis has been set up for Monday to Friday at [facility] clinic. Home oxygen evaluation was completed, she did not require supplemental oxygen at this time. Patient is currently in stable condition to be discharged home. Significant Diagnostic Studies:     XR CHEST PORTABLE  Result Date: 8/10/2021    1. . Congestive failure with interstitial and alveolar edema. Signed by Dr Keegan Neil:   CBC:   Recent Labs     08/11/21 0438 08/12/21 0312 08/13/21 0257   WBC 10.4 8.1 8.4   HGB 9.3* 8.3* 7.8*    188 192     BMP:    Recent Labs     08/11/21 0438 08/12/21 0312 08/13/21 0257    136 141   K 4.1 3.7 3.8    101 103   CO2 23 26 27   BUN 30* 20 29*   CREATININE 3.5* 3.0* 3.7*   GLUCOSE 242* 200* 209*       Physical Exam:   Vital Signs: BP (!) 151/63   Pulse 69   Temp 96.9 °F (36.1 °C) (Temporal)   Resp 18   Ht 5' 5\" (1.651 m)   Wt (!) 391 lb 7 oz (177.6 kg)   LMP 01/01/2000   SpO2 97%   BMI 65.14 kg/m²   General appearance:. Alert and Cooperative   HEENT: Normocephalic. Chest: Diminished breath sounds bilaterally without wheezes or rhonchi. Cardiac: RRR, S1, S2 normal.  Soft systolic murmur appreciated. No gallops, or rubs auscultated. Abdomen: soft, non-tender; non-distended normal bowel sounds no masses, no organomegaly. Extremities: No clubbing or cyanosis. No peripheral edema. Peripheral pulses palpable. Neurologic: Grossly intact.         Discharge Medications:          Medication List      START taking these medications    b complex-C-folic acid 1 MG capsule  Take 1 capsule by mouth daily  Start taking on: August 14, 2021        Sierra Perez taking these medications    allopurinol 100 MG tablet  Commonly known as: ZYLOPRIM     amLODIPine 10 MG tablet  Commonly known as: NORVASC     aspirin 81 MG tablet     B-D ULTRAFINE III SHORT PEN 31G X 8 MM Misc  Generic drug: Insulin Pen Needle     BD Insulin Syringe U/F 31G X 5/16\" 1 ML Misc  Generic drug: Insulin Syringe-Needle U-100     busPIRone 10 MG tablet  Commonly known as: BUSPAR     carvedilol 12.5 MG tablet  Commonly known as: COREG     cetirizine 10 MG tablet  Commonly known as: ZYRTEC     citalopram 40 MG tablet  Commonly known as: CELEXA     diazePAM 2 MG tablet  Commonly known as: VALIUM     epoetin jose ramon 77947 UNIT/ML injection  Commonly known as: EPOGEN;PROCRIT     Evolocumab 140 MG/ML Soaj     fish oil 1000 MG Caps     fluticasone 50 MCG/ACT nasal spray  Commonly known as: FLONASE     FreeStyle Lancets Misc     HumaLOG KwikPen 200 UNIT/ML Sopn pen  Generic drug: insulin lispro     lamoTRIgine 100 MG tablet  Commonly known as: LAMICTAL     LANTUS SC     levothyroxine 88 MCG tablet  Commonly known as: SYNTHROID     lisinopril 40 MG tablet  Commonly known as: PRINIVIL;ZESTRIL     magnesium oxide 400 MG tablet  Commonly known as: MAG-OX     rosuvastatin 20 MG tablet  Commonly known as: CRESTOR     Trulicity 3 SJ/9.4HM Sopn  Generic drug: Dulaglutide     VITAMIN D2 PO        STOP taking these medications    dexamethasone 1 MG tablet  Commonly known as: DECADRON     Potassium 99 MG Tabs     sodium bicarbonate 650 MG tablet           Where to Get Your Medications      These medications were sent to [facility], [address] - P [phone] - F [phone]  [address] [place] [address] S-D, [address] [facility] [place] [address]    Phone: [phone]   · b complex-C-folic acid 1 MG capsule            Discharge Instructions: Follow up with Eusebia Jamison MD within 2 business days of Discharge. Take medications as directed. Resume activity as tolerated. Diet: ADULT DIET; Regular; Low Sodium (2 gm); Low Potassium (Less than 3000 mg/day); Low Phosphorus (Less than 1000 mg)     Disposition: Patient is medically stable and will be discharged home.     Time spent on discharge 35 minutes spent in assessing patient, reviewing medications, discussion with nursing, confirming safe discharge plan and preparation of discharge summary. Signed:  Electronically signed by JAMES Reyes CNP on 8/13/21 at 1:28 PM CDT         EMR Dragon/Transcription disclaimer:   Much of this encounter note is an electronic transcription/translation of spoken language to printed text.  The electronic translation of spoken language may permit erroneous, or at times, nonsensical words or phrases to be inadvertently transcribed; although attempts have made to review the note for such errors, some may still exist.

## 2021-08-13 NOTE — PROGRESS NOTES
Nephrology ([address] [facility]) Progress Note    Patient:  Santos Vergara  YOB: 1960  Date of Service: 8/13/2021  MRN: [id number]   Acct: [de-identified]   Primary Care Physician: Roland Richard MD  Advance Directive: Full Code  Admit Date: 8/10/2021       Hospital Day: 3  Referring Provider: Rome Rodriguez DO    Patient Seen, Chart, Consults, Notes, Labs, Radiology studies reviewed. Subjective:  Patient is a 22-year-old very pleasant woman with past medical history of morbid obesity, type 2 diabetes complicated with renal disease, and chronic kidney disease now stage V.  She is followed at the renal clinic by Dr. Drew Maliker is status post left brachiocephalic AV fistula creation by Dr. Benton Snellen on July 12 followed by superficialization of the left cephalic vein on July 27.  Patient presented to the ED for increasing shortness of breath and weakness.  Continues to have urine output.  Denies NSAIDs abuse.  Has required some supplemental oxygen. Patient was seen by vascular surgery and she had a Permcath placed on 8/11. Dialysis was also administered on 8/11. Today, patient is doing better. She is less dyspneic. She is to require some oxygen via nasal cannula. She was seen examined on dialysis.         Dialysis   Pt was seen on RRT  Modality: Hemodialysis  Access: Catheter  Location: right upper  QB: 400  QD: 600  UF: 3 liters    Allergies:  Codeine    Medicines:  Current Facility-Administered Medications   Medication Dose Route Frequency Provider Last Rate Last Admin    b complex-C-folic acid (NEPHROCAPS) capsule 1 mg  1 capsule Oral Daily Azucena Lofton MD   1 mg at 08/12/21 0806    epoetin jose ramon-epbx (RETACRIT) injection 3,000 Units  3,000 Units Subcutaneous Once per day on Mon Wed Fri Azucena Lofton MD   3,000 Units at 08/11/21 1725    0.45 % sodium chloride infusion   Intravenous Continuous Azucena Lofton  mL/hr at 08/11/21 1117 New Bag at 08/11/21 1117    0.9 % sodium chloride infusion   Intravenous Continuous Christian Solis MD        acetaminophen (TYLENOL) tablet 650 mg  650 mg Oral Q4H PRN Christian Solis MD        HYDROcodone-acetaminophen [facility]) 5-325 MG per tablet 1 tablet  1 tablet Oral Q4H PRN Christian Solis MD        Or    HYDROcodone-acetaminophen [facility]) 5-325 MG per tablet 2 tablet  2 tablet Oral Q4H PRN Christian Solis MD   2 tablet at 08/11/21 1822    ondansetron (ZOFRAN) injection 4 mg  4 mg Intravenous Q8H PRN Christian Solis MD        sodium chloride flush 0.9 % injection 5-40 mL  5-40 mL Intravenous 2 times per day Christian Solis MD   10 mL at 08/12/21 0806    sodium chloride flush 0.9 % injection 5-40 mL  5-40 mL Intravenous PRN Christian Solis MD        0.9 % sodium chloride infusion  25 mL Intravenous PRN Christian Solis MD        ondansetron (ZOFRAN-ODT) disintegrating tablet 4 mg  4 mg Oral Q8H PRN Christian Solis MD        Or    ondansetron [facility]) injection 4 mg  4 mg Intravenous Q6H PRN Christian Solis MD        acetaminophen (TYLENOL) tablet 650 mg  650 mg Oral Q6H PRN Christian Solis MD        Or   Dorena Pepper acetaminophen (TYLENOL) suppository 650 mg  650 mg Rectal Q6H PRN Christian Solis MD        heparin (porcine) injection 5,000 Units  5,000 Units Subcutaneous 3 times per day Christian Solis MD   5,000 Units at 08/13/21 0532    magnesium sulfate 1000 mg in dextrose 5% 100 mL IVPB  1,000 mg Intravenous PRN Christian Solis MD        potassium chloride (KLOR-CON M) extended release tablet 40 mEq  40 mEq Oral PRN Christian Solis MD        Or    potassium bicarb-citric acid (EFFER-K) effervescent tablet 40 mEq  40 mEq Oral PRN Christian Solis MD        Or    potassium chloride 10 mEq/100 mL IVPB (Peripheral Line)  10 mEq Intravenous PRN Christian Solis MD        allopurinol (ZYLOPRIM) tablet 100 mg  100 mg Oral BID Christian Solis MD   100 mg at 08/12/21 2209    amLODIPine (NORVASC) tablet 10 mg  10 mg Oral Daily Jazmin Morales MD   10 mg at 08/11/21 7402    aspirin chewable tablet 81 mg  81 mg Oral Daily Jazmin Morales MD   81 mg at 08/12/21 0805    carvedilol (COREG) tablet 12.5 mg  12.5 mg Oral BID Jazmin Morales MD   12.5 mg at 08/12/21 2209    citalopram (CELEXA) tablet 40 mg  40 mg Oral Daily Jazmin Morales MD   40 mg at 08/12/21 2048    lamoTRIgine (LAMICTAL) tablet 100 mg  100 mg Oral Daily Jazmin Morales MD   100 mg at 08/12/21 7918    levothyroxine (SYNTHROID) tablet 88 mcg  88 mcg Oral Daily Jazmin Morales MD   88 mcg at 08/13/21 0533    sodium bicarbonate tablet 1,300 mg  1,300 mg Oral TID Jazmin Morales MD   1,300 mg at 08/12/21 2209    rosuvastatin (CRESTOR) tablet 20 mg  20 mg Oral Daily Jazmin Morales MD   20 mg at 08/12/21 2375    insulin glargine (LANTUS) injection vial 25 Units  25 Units Subcutaneous Nightly Jazmin Morales MD   25 Units at 08/12/21 2210    insulin lispro (HUMALOG) injection vial 0-12 Units  0-12 Units Subcutaneous TID WC Jazmin Morales MD   4 Units at 08/12/21 1807    insulin lispro (HUMALOG) injection vial 0-6 Units  0-6 Units Subcutaneous Nightly Jazmin Morales MD   3 Units at 08/12/21 2210    glucose (GLUTOSE) 40 % oral gel 15 g  15 g Oral PRN Jazmin Morales MD        dextrose 50 % IV solution  12.5 g Intravenous PRN Jazmin Morales MD        glucagon (rDNA) injection 1 mg  1 mg Intramuscular PRN Jazmin Morales MD        dextrose 5 % solution  100 mL/hr Intravenous PRN Jazmin Morales MD        ipratropium-albuterol (DUONEB) nebulizer solution 1 ampule  1 ampule Inhalation Q4H Claudene Marshall, MD   1 ampule at 08/13/21 7222    hydrALAZINE (APRESOLINE) injection 10 mg  10 mg Intravenous Q6H PRN Jazmin Morales MD           Past Medical History:  Past Medical History:   Diagnosis Date    Anxiety     Arthritis     CKD (chronic kidney disease)     stage 4    Colon polyp     Depression     Embolism - blood clot 2004    Hx of blood clots  Hyperlipidemia     Hypertension     Obesity     Sleep apnea     Thyroid disease     Type II or unspecified type diabetes mellitus without mention of complication, not stated as uncontrolled        Past Surgical History:  Past Surgical History:   Procedure Laterality Date    CHOLECYSTECTOMY      DIALYSIS CATHETER INSERTION N/A 8/11/2021    INSERTION CATHETER DIALYSIS performed by Cris Arambula MD at [address] [place] [address] Left 06/18/2021    LEFT BRACHIAL-CEPHALIC AV FISTULA CREATION performed by Cris Arambula MD at [address] [place] [address] 7/27/2021    LEFT UPPER EXTREMITY CEPHALIC VEIN SUPERFICIALIZATION performed by Cris Arambula MD at [place] [address]  10/17/2018    POLYPECTOMY      RECTAL SURGERY      fistula repair    TONSILLECTOMY AND ADENOIDECTOMY      VASCULAR SURGERY  07/12/2021    SJS- Ultrasound-guided cannulation left distal upper arm cephalic vein with 4 [place] glide sheath, Left upper extremity fistulogram's including venography the superior vena cava    VASCULAR SURGERY  07/27/2021    SJS- Superficialization of the left upper arm cephalic vein arteriovenous fistula       Family History  Family History   Problem Relation Age of Onset    Lung Cancer Mother     Brain Cancer Mother     Heart Attack Father     Prostate Cancer Father     Heart Disease Father     Stroke Father     Uterine Cancer Maternal Grandmother     Cervical Cancer Maternal Grandmother     Diabetes Maternal Grandfather     Other Maternal Grandfather         histoplasmosis    Diabetes Paternal Grandfather        Social History  Social History     Socioeconomic History    Marital status:      Spouse name: Not on file    Number of children: Not on file    Years of education: Not on file    Highest education level: Not on file   Occupational History    Not on file   Tobacco Use    Smoking status: Never Smoker    Smokeless tobacco: Never Used   Vaping Use    Vaping Use: Never used   Substance and Sexual Activity    Alcohol use: No    Drug use: No    Sexual activity: Never   Other Topics Concern    Not on file   Social History Narrative    Not on file     Social Determinants of Health     Financial Resource Strain:     Difficulty of Paying Living Expenses:    Food Insecurity:     Worried About Running Out of Food in the Last Year:     [address] in the Last Year:    Transportation Needs:     Lack of Transportation (Medical):  Lack of Transportation (Non-Medical):    Physical Activity:     Days of Exercise per Week:     Minutes of Exercise per Session:    Stress:     Feeling of Stress :    Social Connections:     Frequency of Communication with Friends and Family:     Frequency of Social Gatherings with Friends and Family:     Attends Christian Services:     Active Member of Clubs or Organizations:     Attends Club or Organization Meetings:     Marital Status:    Intimate Partner Violence:     Fear of Current or Ex-Partner:     Emotionally Abused:     Physically Abused:     Sexually Abused:          Review of Systems:  Reviewed with the patient at the bedside, 6 points reviewed and negative except as noted above.       Objective:  BP 71/75   HR 69  General: awake/alert   Chest:  clear to auscultation bilaterally without respiratory distress  CVS: regular rate and rhythm  Abdominal: soft, nontender, normal bowel sounds  Extremities: no cyanosis but trace leg edema  Skin: warm and dry without rash    Labs:  BMP:   Recent Labs     08/11/21 0438 08/12/21 0312 08/13/21 0257    136 141   K 4.1 3.7 3.8    101 103   CO2 23 26 27   BUN 30* 20 29*   CREATININE 3.5* 3.0* 3.7*   CALCIUM 8.5* 8.0* 7.7*     CBC:   Recent Labs     08/11/21 0438 08/12/21 0312 08/13/21  0257   WBC 10.4 8.1 8.4   HGB 9.3* 8.3* 7.8*   HCT 29.2* 26.9* 25.7*   MCV 97.0 96.8 99.2*    188 192     LIVER PROFILE: Recent Labs     08/11/21 0438 08/12/21 0312 08/13/21  0257   AST 8 7 7   ALT 6 <5* <5*   BILITOT 0.3 <0.2 <0.2   ALKPHOS 88 70 74     PT/INR: No results for input(s): PROTIME, INR in the last 72 hours. APTT: No results for input(s): APTT in the last 72 hours. BNP:  No results for input(s): BNP in the last 72 hours. Ionized Calcium:No results for input(s): IONCA in the last 72 hours. Magnesium:  Recent Labs     08/10/21  1721   MG 1.9     Phosphorus:No results for input(s): PHOS in the last 72 hours. HgbA1C:   Recent Labs     08/10/21  1639   LABA1C 6.6*     Hepatic:   Recent Labs     08/11/21 0438 08/12/21 0312 08/13/21  0257   ALKPHOS 88 70 74   ALT 6 <5* <5*   AST 8 7 7   PROT 6.0* 5.2* 4.6*   BILITOT 0.3 <0.2 <0.2   LABALBU 2.8* 2.3* 2.5*     Lactic Acid: No results for input(s): LACTA in the last 72 hours. Troponin: No results for input(s): CKTOTAL, CKMB, TROPONINT in the last 72 hours. ABGs: No results for input(s): PH, PCO2, PO2, HCO3, O2SAT in the last 72 hours. CRP:  No results for input(s): CRP in the last 72 hours. Sed Rate:  No results for input(s): SEDRATE in the last 72 hours. Cultures:   No results for input(s): CULTURE in the last 72 hours. No results for input(s): BC, Caralee Roller in the last 72 hours. No results for input(s): CXSURG in the last 72 hours. Radiology reports as per the Radiologist  Radiology: XR CHEST PORTABLE    Result Date: 8/10/2021  EXAMINATION: Chest one view 8/10/2021 HISTORY: Shortness of breath. FINDINGS: Today's exam is compared to previous study of 7/12/2021. There is cardiomegaly with pulmonary venous hypertension with vascular redistribution and interstitial and alveolar pulmonary edema. No significant effusions are demonstrated. There is no free air beneath hemidiaphragms. 1.. Congestive failure with interstitial and alveolar edema.  Signed by Dr Fany Connolly   -Chronic kidney disease now stage V  -Type 2 diabetes with renal disease  -Hypertension  -Morbid obesity  -Dyspnea  -Anemia of chronic kidney disease    Plan:  Dialysis as above. Outpatient dialysis was set. Patient is now okay from renal standpoint for discharge. She will be followed at her outpatient dialysis center. Continue BP meds and would withhold sodium bicarbonate.     Alton Hopkins MD, MD  08/13/21  10:21 AM

## 2021-08-13 NOTE — PLAN OF CARE
Problem: Falls - Risk of:  Goal: Will remain free from falls  Description: Will remain free from falls  Outcome: Completed  Goal: Absence of physical injury  Description: Absence of physical injury  Outcome: Completed     Problem: Discharge Planning:  Goal: Discharged to appropriate level of care  Description: Discharged to appropriate level of care  Outcome: Completed     Problem: Serum Glucose Level - Abnormal:  Goal: Ability to maintain appropriate glucose levels will improve  Description: Ability to maintain appropriate glucose levels will improve  Outcome: Completed     Problem: Sensory Perception - Impaired:  Goal: Ability to maintain a stable neurologic state will improve  Description: Ability to maintain a stable neurologic state will improve  Outcome: Completed     Problem: Skin Integrity:  Goal: Will show no infection signs and symptoms  Description: Will show no infection signs and symptoms  Outcome: Completed  Goal: Absence of new skin breakdown  Description: Absence of new skin breakdown  Outcome: Completed

## 2021-08-13 NOTE — FLOWSHEET NOTE
08/12/21 2025   Encounter Summary   Services provided to: Patient   Referral/Consult From: Physician   Complexity of Encounter Moderate   Length of Encounter 30 minutes   Spiritual/Buddhism   Type Spiritual support   Assessment Anxious; Hopeful   Intervention Active listening;Explored feelings, thoughts, concerns;Prayer;Sustaining presence/ Ministry of presence   Outcome Expressed gratitude;Expressed feelings/needs/concerns     Ms. Erlinda Kim was just told her ESRD went from stage IV to stage V. Encourage and support her to stay strong in the spiritual. Her RT came in to do treatment and pt welcome a return visit tomorrow.    Electronically signed by Allan Seen on 8/12/2021 at 8:31 PM

## 2021-08-16 ENCOUNTER — CARE COORDINATION (OUTPATIENT)
Dept: CASE MANAGEMENT | Age: 61
End: 2021-08-16

## 2021-08-16 ENCOUNTER — TELEPHONE (OUTPATIENT)
Dept: VASCULAR SURGERY | Age: 61
End: 2021-08-16

## 2021-08-16 DIAGNOSIS — R09.02 HYPOXIA: Primary | ICD-10-CM

## 2021-08-16 PROCEDURE — 1111F DSCHRG MED/CURRENT MED MERGE: CPT | Performed by: PEDIATRICS

## 2021-08-16 NOTE — CARE COORDINATION
Luan 45 Transitions Initial Follow Up Call    Call within 2 business days of discharge: Yes    Patient: Jaylyn Montes Patient : 1960   MRN: [id number]  Reason for Admission:   Discharge Date: 21 RARS: Readmission Risk Score: 27      Last Discharge [facility]       Complaint Diagnosis Description Type Department Provider    8/10/21 Shortness of Breath Hypoxia . .. ED to Hosp-Admission (Discharged) (ADMITTED) [facility] Cecille Klein DO; Rustam Cao MD           Spoke with: America Conner Decision Maker:    Primary Decision Maker: Ngozi Maria [facility] - [phone]    Transitions of Care Initial Call    Challenges to be reviewed by the provider   Additional needs identified to be addressed with provider: No  none             Method of communication with provider : none      Advance Care Planning:   Does patient have an Advance Directive: not on file; education provided. Was this a readmission? No  Patient stated reason for admission: hypoxia  Patients top risk factors for readmission: functional physical ability, medical condition-hypoxia and medication management    Care Transition Nurse (CTN) contacted the patient by telephone to perform post hospital discharge assessment. Verified name and  with patient as identifiers. Provided introduction to self, and explanation of the CTN role. CTN reviewed discharge instructions, medical action plan and red flags with patient who verbalized understanding. Patient given an opportunity to ask questions and does not have any further questions or concerns at this time. Were discharge instructions available to patient? Yes. Reviewed appropriate site of care based on symptoms and resources available to patient including: PCP and Specialist. The patient agrees to contact the PCP office for questions related to their healthcare.      Medication reconciliation was performed with patient, who verbalizes understanding of administration of home medications. Advised obtaining a 90-day supply of all daily and as-needed medications. Covid Risk Education     Educated patient about risk for severe COVID-19 due to risk factors according to CDC guidelines. CTN reviewed discharge instructions, medical action plan and red flag symptoms with the patient who verbalized understanding. Discussed COVID vaccination status: Yes. Education provided on COVID-19 vaccination as appropriate. Discussed exposure protocols and quarantine with CDC Guidelines. Patient was given an opportunity to verbalize any questions and concerns and agrees to contact CTN or health care provider for questions related to their healthcare. Reviewed and educated patient on any new and changed medications related to discharge diagnosis. Was patient discharged with a pulse oximeter? No Discussed and confirmed pulse oximeter discharge instructions and when to notify provider or seek emergency care. CTN provided contact information. Plan for follow-up call in 5-7 days based on severity of symptoms and risk factors. Plan for next call: respiratory status, HD sessions, blood sugar            Care Transitions 24 Hour Call    Do you have any ongoing symptoms?: No  Do you have a copy of your discharge instructions?: Yes  Do you have all of your prescriptions and are they filled?: No  Have you been contacted by a [address]?: No  Have you scheduled your follow up appointment?: Yes  How are you going to get to your appointment?: Car - family or friend to transport  Were you discharged with any Home Care or Post Acute Services: No  Do you feel like you have everything you need to keep you well at home?: Yes  Care Transitions Interventions         Follow Up : Spoke with patient today for initial CT call after discharge from [facility]. She is at HD today finishing up session. She will be going M-F and off Sat and Sun.  She says she is doing ok, picking up new med today. Did review them, 1111F order added. She is aware of meds to stop. She says her blood sugars had been over 200 and are starting to come down, today was 159. Her  transports to HD. She has her HFU with PCP and she has verified she says. She is calling Dr. Oswaldo Canas office today to schedule follow up for Vascular. She does not sound SOA today breathing is regular. She has had the Covid vaccine and  is PHCDM, no LW. CTN counseled. Discussed CTN calls and follow up and she is accepting. Discussed Covid precautions and CDC guidelines. Encouraged to call with prn needs. Will follow up at a later time. No future appointments.     Rossy Mas RN

## 2021-08-16 NOTE — TELEPHONE ENCOUNTER
Sara requests that someone return their call. Would like to know if follow up is needed after previous procedure. Please call pt to advise. The best time to reach her is Anytime. Thank you.

## 2021-08-19 ENCOUNTER — OFFICE VISIT (OUTPATIENT)
Dept: FAMILY MEDICINE CLINIC | Facility: CLINIC | Age: 61
End: 2021-08-19

## 2021-08-19 ENCOUNTER — OFFICE VISIT (OUTPATIENT)
Dept: VASCULAR SURGERY | Age: 61
End: 2021-08-19

## 2021-08-19 VITALS
HEART RATE: 77 BPM | RESPIRATION RATE: 22 BRPM | OXYGEN SATURATION: 96 % | HEIGHT: 65 IN | SYSTOLIC BLOOD PRESSURE: 132 MMHG | DIASTOLIC BLOOD PRESSURE: 58 MMHG | WEIGHT: 293 LBS | BODY MASS INDEX: 48.82 KG/M2 | TEMPERATURE: 97.1 F

## 2021-08-19 VITALS
SYSTOLIC BLOOD PRESSURE: 135 MMHG | BODY MASS INDEX: 65.07 KG/M2 | WEIGHT: 293 LBS | RESPIRATION RATE: 20 BRPM | HEART RATE: 71 BPM | DIASTOLIC BLOOD PRESSURE: 63 MMHG | TEMPERATURE: 98.3 F

## 2021-08-19 DIAGNOSIS — Z99.2 STAGE 5 CHRONIC KIDNEY DISEASE ON CHRONIC DIALYSIS (HCC): ICD-10-CM

## 2021-08-19 DIAGNOSIS — Z99.2 ESRD ON DIALYSIS (HCC): Primary | ICD-10-CM

## 2021-08-19 DIAGNOSIS — E66.01 CLASS 3 SEVERE OBESITY DUE TO EXCESS CALORIES WITHOUT SERIOUS COMORBIDITY WITH BODY MASS INDEX (BMI) OF 50.0 TO 59.9 IN ADULT (HCC): Primary | ICD-10-CM

## 2021-08-19 DIAGNOSIS — N18.6 STAGE 5 CHRONIC KIDNEY DISEASE ON CHRONIC DIALYSIS (HCC): ICD-10-CM

## 2021-08-19 DIAGNOSIS — N18.6 ESRD ON DIALYSIS (HCC): Primary | ICD-10-CM

## 2021-08-19 PROBLEM — J96.01 ACUTE HYPOXEMIC RESPIRATORY FAILURE: Status: ACTIVE | Noted: 2021-08-10

## 2021-08-19 PROCEDURE — 99024 POSTOP FOLLOW-UP VISIT: CPT | Performed by: PHYSICIAN ASSISTANT

## 2021-08-19 PROCEDURE — 99214 OFFICE O/P EST MOD 30 MIN: CPT | Performed by: PEDIATRICS

## 2021-08-19 NOTE — PROGRESS NOTES
3year-old female who has a history of end-stage renal disease on hemodialysis. She underwent placement of a right IJ PermCath. She underwent superficial cessation of her left upper extremity cephalic vein AV fistula on 7/27/2021 by Dr. Nani Antoine. Today she comes in for follow-up. No fever or chills reported. She is a little bit of intermittent numbness and tingling in her left arm she reports 1 episode of a stabbing type pain with bending her arm that resolved. On evaluation today, incision is clean, dry, intact. Bruit is Present. Thrill is Present. No erythema, signs of infection noted or drainage noted. Right IJ PermCath intact. She has a few sutures in her right neck we will remove those today. We have recommended continued ASA 81 mg po qd daily. We will notify her unit that they can start trying to access her fistula, if they have any issues they need to let us know immediately. Otherwise,  we will follow up with him prn . This should bring you up to date on [address]. As always, we want to thank you for allowing us to participate in the care of your patients.     Sincerely,    Donald Fulton PA-C

## 2021-08-19 NOTE — PROGRESS NOTES
"Chief Complaint  Acute hypoxia (Hospital follow-up - [facility] 08/10/2021)    Subjective          Maria C Shrestha presents to [facility]  Patient is here today for Hospital follow-up for acute hypoxia.    Feels better after dialysis.  In kidney failure.  Neck gets weak at times.      Objective   Vital Signs:   /58 (BP Location: Right arm, Patient Position: Sitting, Cuff Size: Adult)   Pulse 77   Temp 97.1 °F (36.2 °C) (Infrared)   Resp 22   Ht 164.5 cm (64.75\")   Wt (!) 160 kg (352 lb 6 oz)   SpO2 96%   BMI 59.09 kg/m²     Physical Exam  Vitals and nursing note reviewed.   Constitutional:       Appearance: Normal appearance. She is well-developed. She is obese.   HENT:      Head: Normocephalic and atraumatic.      Right Ear: Tympanic membrane, ear canal and external ear normal.      Left Ear: Tympanic membrane, ear canal and external ear normal.      Nose: Nose normal. No septal deviation, nasal tenderness or congestion.      Mouth/Throat:      Lips: Pink. No lesions.      Mouth: Mucous membranes are moist. No oral lesions.      Dentition: Normal dentition.      Pharynx: Oropharynx is clear. No pharyngeal swelling, oropharyngeal exudate or posterior oropharyngeal erythema.   Eyes:      General: Lids are normal. Vision grossly intact. No scleral icterus.        Right eye: No discharge.         Left eye: No discharge.      Extraocular Movements: Extraocular movements intact.      Conjunctiva/sclera: Conjunctivae normal.      Right eye: Right conjunctiva is not injected.      Left eye: Left conjunctiva is not injected.      Pupils: Pupils are equal, round, and reactive to light.   Neck:      Thyroid: No thyroid mass.      Trachea: Trachea normal.   Cardiovascular:      Rate and Rhythm: Normal rate and regular rhythm.      Heart sounds: Normal heart sounds. No murmur heard.   No gallop.    Pulmonary:      Effort: Pulmonary effort is normal.      Breath sounds: Normal breath " sounds and air entry. No wheezing, rhonchi or rales.   Musculoskeletal:         General: No tenderness or deformity. Normal range of motion.      Cervical back: Full passive range of motion without pain, normal range of motion and neck supple.      Thoracic back: Normal.      Right lower leg: No edema.      Left lower leg: No edema.   Skin:     General: Skin is warm and dry.             Comments: Shunt scars noted.   Neurological:      Mental Status: She is alert and oriented to person, place, and time.      Cranial Nerves: Cranial nerves are intact.      Sensory: Sensation is intact.      Motor: Motor function is intact.      Coordination: Coordination is intact.      Gait: Gait is intact.      Deep Tendon Reflexes: Reflexes are normal and symmetric.   Psychiatric:         Mood and Affect: Mood and affect normal.         Behavior: Behavior normal.         Judgment: Judgment normal.        Result Review :                 Assessment and Plan    Diagnoses and all orders for this visit:    1. Class 3 severe obesity due to excess calories without serious comorbidity with body mass index (BMI) of 50.0 to 59.9 in adult (CMS/[facility]) (Primary)    2. Stage 5 chronic kidney disease on chronic dialysis (CMS/[facility])  Assessment & Plan:  Kacey chart reviewed.  Dialysis is starting up full swing.        Follow Up   Return in about 3 months (around 11/19/2021) for Recheck.  Patient was given instructions and counseling regarding her condition or for health maintenance advice. Please see specific information pulled into the AVS if appropriate.

## 2021-08-23 ENCOUNTER — TELEPHONE (OUTPATIENT)
Dept: ONCOLOGY | Facility: CLINIC | Age: 61
End: 2021-08-23

## 2021-08-23 NOTE — TELEPHONE ENCOUNTER
Caller: Maria C Shrestha    Relationship: Self    Best call back number: [phone]    What is the best time to reach you:     Who are you requesting to speak with (clinical staff, provider,  specific staff member): CLINICAL    Do you know the name of the person who called:     What was the call regarding: PT WAS TOLD BY [facility] THAT THEY WOULD BE TAKING OVER IRON.    PT CALLING TO VERIFY IF SHE NEEDS TO KEEP APPTS W/ OUR OFFICE    Do you require a callback:YES

## 2021-08-23 NOTE — TELEPHONE ENCOUNTER
Spoke with pt about [facility] taking over her iron. I advised pt that Sindy Abad APRN stated that if they were going to take over and monitor her anemia she could just call us if anemia worsens. Sindy Abad also stated that she needed to get her procrit when she could. Pt stated when I called to speak with her that she was a little overwhelmed with all of this going on. I advised pt to keep her upcoming appt with Sindy Abad and they could go over what is best for the pt. Pt V/U. 08/23/2021 BS

## 2021-08-24 ENCOUNTER — CARE COORDINATION (OUTPATIENT)
Dept: CASE MANAGEMENT | Age: 61
End: 2021-08-24

## 2021-08-24 NOTE — CARE COORDINATION
Luan 45 Transitions Follow Up Call    2021    Patient: Waldo Sharpe  Patient : 1960   MRN: [id number]  Reason for Admission:   Discharge Date: 21 RARS: Readmission Risk Score: 32         Spoke with: Hu Transitions Subsequent and Final Call    Subsequent and Final Calls  Do you have any ongoing symptoms?: No  Have your medications changed?: No  Do you have any questions related to your medications?: No  Do you currently have any active services?: No  Do you have any needs or concerns that I can assist you with?: No  Identified Barriers: None  Care Transitions Interventions  Other Interventions: Follow Up : Spoke with patient today for follow up call. She says she is feeling better. Says she is now able to use the fistula created for HD, all going good. She says appetite is good, and sessions are going well. Says they are working with her medications for bp , had some low readings, PCP has a regimen they are adjusting to get her where she is more stable bp wise. She says she is a bit tired on HD days, CTN explained that is normal and she will soon adjust and get a routine going and know what to expect from each day. No issues or problems today. Will end CTN outreach at this time. No future appointments.     Sim Rivera RN

## 2021-08-25 ENCOUNTER — TELEPHONE (OUTPATIENT)
Dept: ONCOLOGY | Facility: CLINIC | Age: 61
End: 2021-08-25

## 2021-08-25 NOTE — TELEPHONE ENCOUNTER
Caller: ILEANA STEVEN    Relationship to patient: PATIENT    Best call back number: [phone]    Type of visit: LAB AND FOLLOW UP    Requested date: ANY WED    If rescheduling, when is the original appointment: 8/26/21    Additional notes: PLEASE CANCEL APPT FOR 8/26 @ 9:30 LAB AND 10:00 FU W/JUAN A MOORE. PATIENT HAS DIALYSIS THAT DAY AND WOULD LIKE TO RESCHEDULE.    PLEASE CALL TO DISCUSS.

## 2021-08-26 ENCOUNTER — APPOINTMENT (OUTPATIENT)
Dept: LAB | Facility: HOSPITAL | Age: 61
End: 2021-08-26

## 2021-08-27 ENCOUNTER — TELEPHONE (OUTPATIENT)
Dept: VASCULAR SURGERY | Age: 61
End: 2021-08-27

## 2021-08-27 ENCOUNTER — PREP FOR PROCEDURE (OUTPATIENT)
Dept: VASCULAR SURGERY | Age: 61
End: 2021-08-27

## 2021-08-27 RX ORDER — LIDOCAINE HYDROCHLORIDE 10 MG/ML
5 INJECTION, SOLUTION EPIDURAL; INFILTRATION; INTRACAUDAL; PERINEURAL ONCE
Status: CANCELLED | OUTPATIENT
Start: 2021-08-27 | End: 2021-08-27

## 2021-08-27 NOTE — TELEPHONE ENCOUNTER
I spoke with Kenneth Ross and Babs Buck via phone while patient was at dialysis and scheduled Sara's perm-cath removal. I addressed instructions via phone. I faxed instructions also. Patient verbally understood all instructions. Instructions given are as follows:          Maria Fernandaeric Hinton  1960  Dialysis Procedure Orders- Perm-Cath Removal    1. Report to the [facility] at [facility] (go in the front door and to the left) on Monday 8/30/21, at  6:00am for your procedure with Dr. Tiki Foy. 2. Please take all medications as normally scheduled to take, including heart and blood pressure medicines with a sip of water. 3. Plan to stay at the hospital for 1-2 hours before being released  by the physician. You will need someone to drive you home after the procedure. 4. To ensure the health and safety of our patients and staff, [facility] has implemented visitor restrictions. Only one person will be allowed to accompany you for your procedure. If you or your visitor are exhibiting signs & symptoms of illness such as fever, cough, sore throat or body aches, we ask that you reschedule your procedure to a later date after your symptoms have been resolved. 5. New policy requires that anyone who comes into the hospital will be required to wear a face mask. A cloth mask is acceptable. 6. Other Directions: Please call our office with questions [phone]. Please bring your COVID 19 vaccine card with you to your procedure. Unless instructed otherwise by your physician, cleanse incision/puncture site twice daily with soap and water. Apply dry gauze. Do not get in tub. Okay to shower. Do not apply any salve, cream, peroxide or alcohol to the incision.   Call with any increasing redness or drainage        PLEASE NOTE:  If the patient is unable to sign his/her own paperwork, the appointed caregiver (POA, child, sibling, etc) must be present at the time of registration for all testing and procedures. Transportation to and from all testing and procedure appointments is the sole responsibility of the patient, caregiver, and/or nursing facility in which they reside. Please remember you will not be able to drive after you are discharged. Please call the office at [phone] with any questions or concerns. Please allow 48-72 hours notice for cancellations or rescheduling. We will attempt to accommodate your needs to the best of our capabilities, however, strict policies with procedure room availability does not allow much flexibility.

## 2021-08-27 NOTE — H&P
Patient Care Team:  Sosa Bernabe MD as PCP - General (Pediatrics)      Rachael Veras 64 y.o. female with a history of renal failure requiring hemodialysis access. Patient is currently dialyzing via AV fistula without difficulty.      Patient Active Problem List   Diagnosis    Morbid obesity ([address]    Thyroid disease    Sleep apnea    Mood disorder ([address]    Hypertension associated with diabetes ([address]    Anemia of chronic renal failure    Type 2 DM with CKD stage 3 and hypertension (HCC)    Hypertriglyceridemia    Anxiety    Embolism (HCC)    Vitamin D deficiency    Anemia    Hypocalcemia    Allergic rhinitis with postnasal drip    B12 deficiency    Deep vein thrombosis of lower extremity (HCC)    Disease of liver    Mixed diabetic hyperlipidemia associated with type 2 diabetes mellitus ([address]    ESRD (end stage renal disease) ([address]    Acute hemodialysis encounter ([address]    Acute hypoxemic respiratory failure ([address]    DM (diabetes mellitus) ([address]    HTN (hypertension)    Hypoxia      See attached meds  Allergies:  Codeine  Past Medical History:   Diagnosis Date    Anxiety     Arthritis     CKD (chronic kidney disease)     stage 4    Colon polyp     Depression     Embolism - blood clot 2004    Hx of blood clots     Hyperlipidemia     Hypertension     Obesity     Sleep apnea     Thyroid disease     Type II or unspecified type diabetes mellitus without mention of complication, not stated as uncontrolled       Past Surgical History:   Procedure Laterality Date    CHOLECYSTECTOMY      DIALYSIS CATHETER INSERTION N/A 8/11/2021    INSERTION CATHETER DIALYSIS performed by Chris Begum MD at [place] [address] Left 06/18/2021    LEFT BRACHIAL-CEPHALIC AV FISTULA CREATION performed by Chris Begum MD at [place] [address] Left 7/27/2021    LEFT UPPER EXTREMITY CEPHALIC VEIN SUPERFICIALIZATION performed by Chris Begum MD at [address] [place] [address] AND CURETTAGE OF UTERUS      HYSTERECTOMY  10/17/2018    POLYPECTOMY      RECTAL SURGERY      fistula repair    TONSILLECTOMY AND ADENOIDECTOMY      VASCULAR SURGERY  07/12/2021    [facility]- Ultrasound-guided cannulation left distal upper arm cephalic vein with 4 Hungarian glide sheath, Left upper extremity fistulogram's including venography the superior vena cava    VASCULAR SURGERY  07/27/2021    [facility]- Superficialization of the left upper arm cephalic vein arteriovenous fistula      Family History   Problem Relation Age of Onset    Lung Cancer Mother     Brain Cancer Mother     Heart Attack Father     Prostate Cancer Father     Heart Disease Father     Stroke Father     Uterine Cancer Maternal Grandmother     Cervical Cancer Maternal Grandmother     Diabetes Maternal Grandfather     Other Maternal Grandfather         histoplasmosis    Diabetes Paternal Grandfather       Social History     Tobacco Use    Smoking status: Never Smoker    Smokeless tobacco: Never Used   Substance Use Topics    Alcohol use: No       HEENT __normocepahlic; sclera clear  Neck   Supple  Lungs -cta  Heart  rr  GI - Abdomen soft, non-tender  Extremities without cyanosis  Skin without significant lesions     Diagnosis End Stage CKD requiring dialysis    Permit for permacath removal    Risks have been reviewed with the patient including but not limited to heart attack, death, CVA, bleeding, nerve injury, infection, steal, pain, and need for further surgery.       _______________________________________________________________________  Signature     Date    Time

## 2021-08-27 NOTE — H&P (VIEW-ONLY)
Patient Care Team:  Hailey Olivarez MD as PCP - General (Pediatrics)      Sandra Bond 64 y.o. female with a history of renal failure requiring hemodialysis access. Patient is currently dialyzing via AV fistula without difficulty.      Patient Active Problem List   Diagnosis    Morbid obesity ([address]    Thyroid disease    Sleep apnea    Mood disorder ([address]    Hypertension associated with diabetes ([address]    Anemia of chronic renal failure    Type 2 DM with CKD stage 3 and hypertension (HCC)    Hypertriglyceridemia    Anxiety    Embolism (HCC)    Vitamin D deficiency    Anemia    Hypocalcemia    Allergic rhinitis with postnasal drip    B12 deficiency    Deep vein thrombosis of lower extremity (HCC)    Disease of liver    Mixed diabetic hyperlipidemia associated with type 2 diabetes mellitus ([address]    ESRD (end stage renal disease) ([address]    Acute hemodialysis encounter ([address]    Acute hypoxemic respiratory failure ([address]    DM (diabetes mellitus) ([address]    HTN (hypertension)    Hypoxia      See attached meds  Allergies:  Codeine  Past Medical History:   Diagnosis Date    Anxiety     Arthritis     CKD (chronic kidney disease)     stage 4    Colon polyp     Depression     Embolism - blood clot 2004    Hx of blood clots     Hyperlipidemia     Hypertension     Obesity     Sleep apnea     Thyroid disease     Type II or unspecified type diabetes mellitus without mention of complication, not stated as uncontrolled       Past Surgical History:   Procedure Laterality Date    CHOLECYSTECTOMY      DIALYSIS CATHETER INSERTION N/A 8/11/2021    INSERTION CATHETER DIALYSIS performed by Barrera Adams MD at [address] [place] [address] Left 06/18/2021    LEFT BRACHIAL-CEPHALIC AV FISTULA CREATION performed by Barrera Adams MD at [address] [place] [address] Left 7/27/2021    LEFT UPPER EXTREMITY CEPHALIC VEIN SUPERFICIALIZATION performed by Barrera Adams MD at [place] [address] AND CURETTAGE OF UTERUS      HYSTERECTOMY  10/17/2018    POLYPECTOMY      RECTAL SURGERY      fistula repair    TONSILLECTOMY AND ADENOIDECTOMY      VASCULAR SURGERY  07/12/2021    [facility]- Ultrasound-guided cannulation left distal upper arm cephalic vein with 4 Lithuanian glide sheath, Left upper extremity fistulogram's including venography the superior vena cava    VASCULAR SURGERY  07/27/2021    [facility]JS- Superficialization of the left upper arm cephalic vein arteriovenous fistula      Family History   Problem Relation Age of Onset    Lung Cancer Mother     Brain Cancer Mother     Heart Attack Father     Prostate Cancer Father     Heart Disease Father     Stroke Father     Uterine Cancer Maternal Grandmother     Cervical Cancer Maternal Grandmother     Diabetes Maternal Grandfather     Other Maternal Grandfather         histoplasmosis    Diabetes Paternal Grandfather       Social History     Tobacco Use    Smoking status: Never Smoker    Smokeless tobacco: Never Used   Substance Use Topics    Alcohol use: No       HEENT __normocepahlic; sclera clear  Neck   Supple  Lungs -cta  Heart  rr  GI - Abdomen soft, non-tender  Extremities without cyanosis  Skin without significant lesions     Diagnosis End Stage CKD requiring dialysis    Permit for permacath removal    Risks have been reviewed with the patient including but not limited to heart attack, death, CVA, bleeding, nerve injury, infection, steal, pain, and need for further surgery.       _______________________________________________________________________  Signature     Date    Time

## 2021-08-30 ENCOUNTER — HOSPITAL ENCOUNTER (OUTPATIENT)
Dept: OTHER | Age: 61
Discharge: HOME | End: 2021-08-30
Payer: MEDICARE

## 2021-08-30 VITALS
OXYGEN SATURATION: 98 % | DIASTOLIC BLOOD PRESSURE: 43 MMHG | TEMPERATURE: 96.9 F | SYSTOLIC BLOOD PRESSURE: 94 MMHG | HEART RATE: 72 BPM | RESPIRATION RATE: 19 BRPM

## 2021-08-30 PROCEDURE — 36589 REMOVAL TUNNELED CV CATH: CPT | Performed by: SURGERY

## 2021-08-30 PROCEDURE — 2500000003 HC RX 250 WO HCPCS

## 2021-08-30 PROCEDURE — 2500000003 HC RX 250 WO HCPCS: Performed by: PHYSICIAN ASSISTANT

## 2021-08-30 PROCEDURE — 36589 REMOVAL TUNNELED CV CATH: CPT

## 2021-08-30 RX ORDER — HYDROCODONE BITARTRATE AND ACETAMINOPHEN 5; 325 MG/1; MG/1
2 TABLET ORAL EVERY 4 HOURS PRN
Status: DISCONTINUED | OUTPATIENT
Start: 2021-08-30 | End: 2021-09-01 | Stop reason: HOSPADM

## 2021-08-30 RX ORDER — LIDOCAINE HYDROCHLORIDE 10 MG/ML
5 INJECTION, SOLUTION EPIDURAL; INFILTRATION; INTRACAUDAL; PERINEURAL ONCE
Status: COMPLETED | OUTPATIENT
Start: 2021-08-30 | End: 2021-08-30

## 2021-08-30 RX ORDER — ONDANSETRON 2 MG/ML
4 INJECTION INTRAMUSCULAR; INTRAVENOUS EVERY 8 HOURS PRN
Status: DISCONTINUED | OUTPATIENT
Start: 2021-08-30 | End: 2021-09-01 | Stop reason: HOSPADM

## 2021-08-30 RX ORDER — HYDROCODONE BITARTRATE AND ACETAMINOPHEN 5; 325 MG/1; MG/1
1 TABLET ORAL EVERY 4 HOURS PRN
Status: DISCONTINUED | OUTPATIENT
Start: 2021-08-30 | End: 2021-09-01 | Stop reason: HOSPADM

## 2021-08-30 RX ORDER — ACETAMINOPHEN 325 MG/1
650 TABLET ORAL EVERY 4 HOURS PRN
Status: DISCONTINUED | OUTPATIENT
Start: 2021-08-30 | End: 2021-09-01 | Stop reason: HOSPADM

## 2021-08-30 RX ADMIN — LIDOCAINE HYDROCHLORIDE 10 ML: 10 INJECTION, SOLUTION EPIDURAL; INFILTRATION; INTRACAUDAL; PERINEURAL at 08:49

## 2021-08-30 NOTE — OP NOTE
Preprocedure Diagnosis:    1. Patient has working fistula  2. ESRD on hemodialysis      Postprocedure Diagnosis:  Same    Procedure:  Removal of tunneled dialysis catheter right internal jugular vein    Surgeon:  Kevin Armando. Jaskaran Mcdonald M.D. Anesthesia:  Local (1% lidocaine without epinephrine)    EBL:  Less than 50 ml    Findings: The cuff and catheter were completely removed. There were no signs of infection. Procedure in Detail:      After the patient was consented, the right neck and chest were prepped and draped in the usual sterile fashion. Skin and subcutaneous tissues around the exit site and over the cuff were anesthetized with lidocaine. Using a #15 blade, an incision was made around the exit site and the cuff is dissected away from the well incorporated subcutaneous tissues with sharp dissection. The catheter and cuff were then completely removed and pressure was held at the base of the neck for hemostasis. The wound/exit site was then closed with interrupted 3-0 nylon sutures. A sterile dressing was applied. The patient tolerated the procedure well. Sutures can be removed at the dialysis clinic in 1-2 weeks. We can remove the sutures if the patient no longer requires dialysis.

## 2021-08-30 NOTE — PROGRESS NOTES
PT RECEIVED TO CATH HOLDING FOR PURPOSE OF PERMACATH REMOVAL PER DR. LERMA. SPOUSE WITH PT. WILL PREP PT FOR [address] REMOVAL.

## 2021-08-30 NOTE — PROGRESS NOTES
Dr. Manolo Fried here for permacath removal.RSC area injected with 15 ml lidocaine per Dr. Manolo Fried. Permacath removed without difficulty. Sutures x 2 applied per Dr. Manolo Fried.

## 2021-08-30 NOTE — PROGRESS NOTES
Discharge instructions reviewed with patient and patient states understanding.  Patient discharged from cath holding with all belongings and AVS.

## 2021-09-01 ENCOUNTER — TELEPHONE (OUTPATIENT)
Dept: ENDOCRINOLOGY | Facility: CLINIC | Age: 61
End: 2021-09-01

## 2021-09-01 ENCOUNTER — APPOINTMENT (OUTPATIENT)
Dept: GENERAL RADIOLOGY | Age: 61
End: 2021-09-01
Payer: MEDICARE

## 2021-09-01 ENCOUNTER — OFFICE VISIT (OUTPATIENT)
Dept: ONCOLOGY | Facility: CLINIC | Age: 61
End: 2021-09-01

## 2021-09-01 ENCOUNTER — APPOINTMENT (OUTPATIENT)
Dept: ONCOLOGY | Facility: HOSPITAL | Age: 61
End: 2021-09-01

## 2021-09-01 ENCOUNTER — HOSPITAL ENCOUNTER (EMERGENCY)
Age: 61
Discharge: HOME | End: 2021-09-01
Attending: EMERGENCY MEDICINE
Payer: MEDICARE

## 2021-09-01 ENCOUNTER — TELEPHONE (OUTPATIENT)
Dept: ONCOLOGY | Facility: CLINIC | Age: 61
End: 2021-09-01

## 2021-09-01 ENCOUNTER — LAB (OUTPATIENT)
Dept: LAB | Facility: HOSPITAL | Age: 61
End: 2021-09-01

## 2021-09-01 VITALS
HEIGHT: 64 IN | OXYGEN SATURATION: 100 % | WEIGHT: 293 LBS | BODY MASS INDEX: 50.02 KG/M2 | HEART RATE: 88 BPM | TEMPERATURE: 98.4 F | RESPIRATION RATE: 22 BRPM | DIASTOLIC BLOOD PRESSURE: 57 MMHG | SYSTOLIC BLOOD PRESSURE: 104 MMHG

## 2021-09-01 VITALS
RESPIRATION RATE: 16 BRPM | HEIGHT: 65 IN | TEMPERATURE: 97.3 F | HEART RATE: 89 BPM | OXYGEN SATURATION: 97 % | DIASTOLIC BLOOD PRESSURE: 82 MMHG | BODY MASS INDEX: 48.82 KG/M2 | SYSTOLIC BLOOD PRESSURE: 106 MMHG | WEIGHT: 293 LBS

## 2021-09-01 DIAGNOSIS — N18.6 ANEMIA IN CHRONIC KIDNEY DISEASE, ON CHRONIC DIALYSIS (HCC): Primary | ICD-10-CM

## 2021-09-01 DIAGNOSIS — I15.2 HYPERTENSION ASSOCIATED WITH DIABETES (HCC): ICD-10-CM

## 2021-09-01 DIAGNOSIS — D63.1 ANEMIA DUE TO STAGE 4 CHRONIC KIDNEY DISEASE (HCC): ICD-10-CM

## 2021-09-01 DIAGNOSIS — N18.4 ANEMIA DUE TO STAGE 4 CHRONIC KIDNEY DISEASE (HCC): ICD-10-CM

## 2021-09-01 DIAGNOSIS — D63.8 ANEMIA, CHRONIC DISEASE: Primary | ICD-10-CM

## 2021-09-01 DIAGNOSIS — Z99.2 ANEMIA IN CHRONIC KIDNEY DISEASE, ON CHRONIC DIALYSIS (HCC): Primary | ICD-10-CM

## 2021-09-01 DIAGNOSIS — R73.9 HYPERGLYCEMIA: Primary | ICD-10-CM

## 2021-09-01 DIAGNOSIS — E11.59 HYPERTENSION ASSOCIATED WITH DIABETES (HCC): ICD-10-CM

## 2021-09-01 DIAGNOSIS — D63.1 ANEMIA IN CHRONIC KIDNEY DISEASE, ON CHRONIC DIALYSIS (HCC): Primary | ICD-10-CM

## 2021-09-01 DIAGNOSIS — E53.8 FOLIC ACID DEFICIENCY: ICD-10-CM

## 2021-09-01 LAB
ALBUMIN SERPL-MCNC: 3.8 G/DL (ref 3.5–5.2)
ALBUMIN SERPL-MCNC: 3.9 G/DL (ref 3.5–5.2)
ALBUMIN/GLOB SERPL: 1.2 G/DL
ALP BLD-CCNC: 91 U/L (ref 35–104)
ALP SERPL-CCNC: 91 U/L (ref 39–117)
ALT SERPL W P-5'-P-CCNC: 12 U/L (ref 1–33)
ALT SERPL-CCNC: 13 U/L (ref 5–33)
ANION GAP SERPL CALCULATED.3IONS-SCNC: 16 MMOL/L (ref 7–19)
ANION GAP SERPL CALCULATED.3IONS-SCNC: 17 MMOL/L (ref 5–15)
AST SERPL-CCNC: 14 U/L (ref 1–32)
AST SERPL-CCNC: 15 U/L (ref 5–32)
BASOPHILS # BLD AUTO: 0.06 10*3/MM3 (ref 0–0.2)
BASOPHILS ABSOLUTE: 0.1 K/UL (ref 0–0.2)
BASOPHILS NFR BLD AUTO: 0.7 % (ref 0–1.5)
BASOPHILS RELATIVE PERCENT: 0.6 % (ref 0–1)
BILIRUB SERPL-MCNC: 0.3 MG/DL (ref 0.2–1.2)
BILIRUB SERPL-MCNC: 0.3 MG/DL (ref 0–1.2)
BUN BLDV-MCNC: 21 MG/DL (ref 8–23)
BUN SERPL-MCNC: 18 MG/DL (ref 8–23)
BUN/CREAT SERPL: 3.7 (ref 7–25)
CALCIUM SERPL-MCNC: 9.4 MG/DL (ref 8.8–10.2)
CALCIUM SPEC-SCNC: 9.2 MG/DL (ref 8.6–10.5)
CHLORIDE BLD-SCNC: 92 MMOL/L (ref 98–111)
CHLORIDE SERPL-SCNC: 92 MMOL/L (ref 98–107)
CO2 SERPL-SCNC: 29 MMOL/L (ref 22–29)
CO2: 30 MMOL/L (ref 22–29)
CREAT SERPL-MCNC: 4.86 MG/DL (ref 0.57–1)
CREAT SERPL-MCNC: 5.4 MG/DL (ref 0.5–0.9)
DEPRECATED RDW RBC AUTO: 63.3 FL (ref 37–54)
EOSINOPHIL # BLD AUTO: 0.13 10*3/MM3 (ref 0–0.4)
EOSINOPHIL NFR BLD AUTO: 1.4 % (ref 0.3–6.2)
EOSINOPHILS ABSOLUTE: 0.2 K/UL (ref 0–0.6)
EOSINOPHILS RELATIVE PERCENT: 2.1 % (ref 0–5)
ERYTHROCYTE [DISTWIDTH] IN BLOOD BY AUTOMATED COUNT: 18 % (ref 12.3–15.4)
GFR AFRICAN AMERICAN: 10
GFR NON-AFRICAN AMERICAN: 8
GFR SERPL CREATININE-BSD FRML MDRD: 9 ML/MIN/1.73
GFR SERPL CREATININE-BSD FRML MDRD: ABNORMAL ML/MIN/{1.73_M2}
GLOBULIN UR ELPH-MCNC: 3.2 GM/DL
GLUCOSE BLD-MCNC: 425 MG/DL (ref 74–109)
GLUCOSE SERPL-MCNC: 483 MG/DL (ref 65–99)
HCT VFR BLD AUTO: 34.9 % (ref 34–46.6)
HCT VFR BLD CALC: 36.6 % (ref 37–47)
HEMOGLOBIN: 11.1 G/DL (ref 12–16)
HGB BLD-MCNC: 11.1 G/DL (ref 12–15.9)
HOLD SPECIMEN: NORMAL
IMM GRANULOCYTES # BLD AUTO: 0.06 10*3/MM3 (ref 0–0.05)
IMM GRANULOCYTES NFR BLD AUTO: 0.7 % (ref 0–0.5)
IMMATURE GRANULOCYTES #: 0.1 K/UL
LYMPHOCYTES # BLD AUTO: 1.52 10*3/MM3 (ref 0.7–3.1)
LYMPHOCYTES ABSOLUTE: 1.6 K/UL (ref 1.1–4.5)
LYMPHOCYTES NFR BLD AUTO: 16.8 % (ref 19.6–45.3)
LYMPHOCYTES RELATIVE PERCENT: 18.5 % (ref 20–40)
MAGNESIUM: 2 MG/DL (ref 1.6–2.4)
MCH RBC QN AUTO: 30.7 PG (ref 27–31)
MCH RBC QN AUTO: 31.1 PG (ref 26.6–33)
MCHC RBC AUTO-ENTMCNC: 30.3 G/DL (ref 33–37)
MCHC RBC AUTO-ENTMCNC: 31.8 G/DL (ref 31.5–35.7)
MCV RBC AUTO: 101.4 FL (ref 81–99)
MCV RBC AUTO: 97.8 FL (ref 79–97)
MONOCYTES # BLD AUTO: 0.53 10*3/MM3 (ref 0.1–0.9)
MONOCYTES ABSOLUTE: 0.8 K/UL (ref 0–0.9)
MONOCYTES NFR BLD AUTO: 5.9 % (ref 5–12)
MONOCYTES RELATIVE PERCENT: 8.8 % (ref 0–10)
NEUTROPHILS ABSOLUTE: 6 K/UL (ref 1.5–7.5)
NEUTROPHILS NFR BLD AUTO: 6.75 10*3/MM3 (ref 1.7–7)
NEUTROPHILS NFR BLD AUTO: 74.5 % (ref 42.7–76)
NEUTROPHILS RELATIVE PERCENT: 69.3 % (ref 50–65)
NRBC BLD AUTO-RTO: 0 /100 WBC (ref 0–0.2)
PDW BLD-RTO: 17.9 % (ref 11.5–14.5)
PLATELET # BLD AUTO: 205 10*3/MM3 (ref 140–450)
PLATELET # BLD: 200 K/UL (ref 130–400)
PMV BLD AUTO: 10.4 FL (ref 9.4–12.3)
PMV BLD AUTO: 10.6 FL (ref 6–12)
POTASSIUM REFLEX MAGNESIUM: 3 MMOL/L (ref 3.5–5)
POTASSIUM SERPL-SCNC: 3.1 MMOL/L (ref 3.5–5.2)
PROT SERPL-MCNC: 7.1 G/DL (ref 6–8.5)
RBC # BLD AUTO: 3.57 10*6/MM3 (ref 3.77–5.28)
RBC # BLD: 3.61 M/UL (ref 4.2–5.4)
SODIUM BLD-SCNC: 138 MMOL/L (ref 136–145)
SODIUM SERPL-SCNC: 138 MMOL/L (ref 136–145)
TOTAL PROTEIN: 7.4 G/DL (ref 6.6–8.7)
TROPONIN: 0.02 NG/ML (ref 0–0.03)
WBC # BLD AUTO: 9.05 10*3/MM3 (ref 3.4–10.8)
WBC # BLD: 8.7 K/UL (ref 4.8–10.8)

## 2021-09-01 PROCEDURE — 36415 COLL VENOUS BLD VENIPUNCTURE: CPT

## 2021-09-01 PROCEDURE — 85025 COMPLETE CBC W/AUTO DIFF WBC: CPT

## 2021-09-01 PROCEDURE — 71045 X-RAY EXAM CHEST 1 VIEW: CPT

## 2021-09-01 PROCEDURE — 80053 COMPREHEN METABOLIC PANEL: CPT

## 2021-09-01 PROCEDURE — 99283 EMERGENCY DEPT VISIT LOW MDM: CPT

## 2021-09-01 PROCEDURE — 83735 ASSAY OF MAGNESIUM: CPT

## 2021-09-01 PROCEDURE — 99214 OFFICE O/P EST MOD 30 MIN: CPT | Performed by: NURSE PRACTITIONER

## 2021-09-01 PROCEDURE — 84484 ASSAY OF TROPONIN QUANT: CPT

## 2021-09-01 ASSESSMENT — ENCOUNTER SYMPTOMS
SHORTNESS OF BREATH: 0
DIARRHEA: 0
VOMITING: 0
NAUSEA: 0
BACK PAIN: 0
SORE THROAT: 0
ABDOMINAL PAIN: 0
COUGH: 0
CHEST TIGHTNESS: 0

## 2021-09-01 NOTE — TELEPHONE ENCOUNTER
CRITICAL LAB VALUE  Received call from [facility]  lab with CRITICAL LAB VALUE    GLUCOSE: 483  This information was sent to CHRISTIANO Abad for review and address

## 2021-09-01 NOTE — TELEPHONE ENCOUNTER
Patient was seen in office, and recommended to go to emergency department for critical blood glucose level.

## 2021-09-01 NOTE — TELEPHONE ENCOUNTER
Pt called stating that she has been having high sugars since being in Dialysis, and she is needing to discuss.    She then mentioned her sugars being 483 at this time, and her Hematologist is advising her to go to the ER, pt was having a hard time explaining on the phone what was going on, I advised her to go to the ER to get checked out, however she wanted me to send a message to let you know that her sugar has been running high every since she started this last month.

## 2021-09-01 NOTE — ED PROVIDER NOTES
[facility] EMERGENCY DEPT  EMERGENCY DEPARTMENT ENCOUNTER      Pt Name: Dhiraj Fitzpatrick  MRN: [id number]  Sumantrongfpascual 1960  Date of evaluation: 9/1/2021  Provider: Leopold Brace, APRN - [place] [id number]       Chief Complaint   Patient presents with    Hyperglycemia     saw Jyotsna Ceja this AM and was told her BG was 483 along with K+ 3.1 and anion gap 17. has not ate anything since BF at 1030. Dialysis via left fistula (M-F daily). HISTORY OF PRESENT ILLNESS   (Location/Symptom, Timing/Onset, Context/Setting, Quality, Duration, Modifying Factors, Severity)  Note limiting factors. Dhiraj Fitzpatrick is a 64 y.o. female who presents to the emergency department with concern for elevated blood sugar. She tells me she is normally very good at controlling her sugar in the 120-150 range, but since starting hemodialysis one month ago today, she has been struggling and today it was elevated to 483. She takes lantus 50 units at night and covers herself with shortacting 10-30 units at mealtimes. She covered herself last with breakfast but has not eaten since. She called Dr Primo Ross regarding her elevated sugar but when she didn't here back presented here for evaluation. She is currently dialyzing Monday through Friday. HPI    Nursing Notes were reviewed. REVIEW OF SYSTEMS    (2-9 systems for level 4, 10 or more for level 5)     Review of Systems   Constitutional: Negative for chills and fever. HENT: Negative for congestion and sore throat. Respiratory: Negative for cough, chest tightness and shortness of breath. Cardiovascular: Negative for chest pain, palpitations and leg swelling. Gastrointestinal: Negative for abdominal pain, diarrhea, nausea and vomiting. Genitourinary: Negative for dysuria, flank pain, frequency and urgency. Musculoskeletal: Negative for back pain and neck pain. Neurological: Positive for weakness (generalized). Negative for dizziness, syncope, light-headedness and headaches. Except as noted above the remainder of the review of systems was reviewed and negative. PAST MEDICAL HISTORY     Past Medical History:   Diagnosis Date    Anxiety     Arthritis     CKD (chronic kidney disease)     stage 4    Colon polyp     Depression     Embolism - blood clot 2004    Hemodialysis patient ([address]     dialysis mon - friday x 2 weeks.     Hx of blood clots     Hyperlipidemia     Hypertension     Obesity     Sleep apnea     Thyroid disease     Type II or unspecified type diabetes mellitus without mention of complication, not stated as uncontrolled          SURGICAL HISTORY       Past Surgical History:   Procedure Laterality Date    CHOLECYSTECTOMY      DIALYSIS CATHETER INSERTION N/A 8/11/2021    INSERTION CATHETER DIALYSIS performed by Bernardo Sylvester MD at [address] [place] [address] Left 06/18/2021    LEFT BRACHIAL-CEPHALIC AV FISTULA CREATION performed by Bernardo Sylvester MD at [address] [place] [address] 7/27/2021    LEFT UPPER EXTREMITY CEPHALIC VEIN SUPERFICIALIZATION performed by Bernardo Sylvester MD at [place] [address]  10/17/2018    POLYPECTOMY      RECTAL SURGERY      fistula repair    TONSILLECTOMY AND ADENOIDECTOMY      VASCULAR SURGERY  07/12/2021    SJS- Ultrasound-guided cannulation left distal upper arm cephalic vein with 4 [place] glide sheath, Left upper extremity fistulogram's including venography the superior vena cava    VASCULAR SURGERY  07/27/2021    SJS- Superficialization of the left upper arm cephalic vein arteriovenous fistula         CURRENT MEDICATIONS       Previous Medications    ALLOPURINOL (ZYLOPRIM) 100 MG TABLET    Take 100 mg by mouth 2 times daily    ASPIRIN 81 MG TABLET    Take 81 mg by mouth daily With Dinner    B COMPLEX-C-FOLIC ACID (NEPHROCAPS) 1 MG CAPSULE    Take 1 capsule by mouth daily    BUSPIRONE (BUSPAR) 10 MG TABLET    Take 20 mg by mouth 2 times daily CETIRIZINE (ZYRTEC) 10 MG TABLET    Take 10 mg by mouth as needed for Allergies     CITALOPRAM (CELEXA) 40 MG TABLET    Take 40 mg by mouth daily    DIAZEPAM (VALIUM) 2 MG TABLET    Take 2 mg by mouth as needed for Anxiety.      EPOETIN WEI (EPOGEN;PROCRIT) 28312 UNIT/ML INJECTION    Inject 10,000 Units into the skin every 30 days     ERGOCALCIFEROL (VITAMIN D2 PO)    Take 50,000 Units by mouth three times a week     EVOLOCUMAB 140 MG/ML SOAJ    Inject 140 mg into the skin every 14 days    FLUTICASONE (FLONASE) 50 MCG/ACT NASAL SPRAY    2 sprays by Nasal route daily as needed for Rhinitis     FREESTYLE LANCETS MISC    Use as instructed 4 x daily    INSULIN GLARGINE (LANTUS SC)    Inject 70 Units into the skin daily With Evening Meals    INSULIN LISPRO (HUMALOG KWIKPEN) 200 UNIT/ML SOPN PEN    Inject 25-80 Units into the skin 3 times daily (with meals)     INSULIN PEN NEEDLE (B-D ULTRAFINE III SHORT PEN) 31G X 8 MM MISC    USE AS DIRECTED TO INJECT FOUR TIMES DAILY    INSULIN SYRINGE-NEEDLE U-100 (BD INSULIN SYRINGE U/F) 31G X 5/16\" 1 ML MISC    AS DIRECTED FOUR TIMES A DAY    LAMOTRIGINE (LAMICTAL) 100 MG TABLET    Take 100 mg by mouth daily Indications: 1/2 tablet with dinner    LEVOTHYROXINE (SYNTHROID) 88 MCG TABLET    Take 88 mcg by mouth Daily     MAGNESIUM OXIDE (MAG-OX) 400 MG TABLET    Take 400 mg by mouth daily    OMEGA-3 FATTY ACIDS (FISH OIL) 1000 MG CAPS    Take 1,000 mg by mouth Daily with supper     ROSUVASTATIN (CRESTOR) 20 MG TABLET    Take 20 mg by mouth daily     TRULICITY 3 CU/3.8SL SOPN    3 mg once a week        ALLERGIES     Codeine    FAMILY HISTORY       Family History   Problem Relation Age of Onset    Lung Cancer Mother     Brain Cancer Mother     Heart Attack Father     Prostate Cancer Father     Heart Disease Father     Stroke Father     Uterine Cancer Maternal Grandmother     Cervical Cancer Maternal Grandmother     Diabetes Maternal Grandfather     Other Maternal Grandfather histoplasmosis    Diabetes Paternal Grandfather           SOCIAL HISTORY       Social History     Socioeconomic History    Marital status:      Spouse name: None    Number of children: None    Years of education: None    Highest education level: None   Occupational History    None   Tobacco Use    Smoking status: Never Smoker    Smokeless tobacco: Never Used   Vaping Use    Vaping Use: Never used   Substance and Sexual Activity    Alcohol use: No    Drug use: No    Sexual activity: Never   Other Topics Concern    None   Social History Narrative    None     Social Determinants of Health     Financial Resource Strain:     Difficulty of Paying Living Expenses:    Food Insecurity:     Worried About Running Out of Food in the Last Year:     Ran Out of Food in the Last Year:    Transportation Needs:     Lack of Transportation (Medical):  Lack of Transportation (Non-Medical):    Physical Activity:     Days of Exercise per Week:     Minutes of Exercise per Session:    Stress:     Feeling of Stress :    Social Connections:     Frequency of Communication with Friends and Family:     Frequency of Social Gatherings with Friends and Family:     Attends [employer]ical Services:     Active Member of Clubs or Organizations:     Attends Club or Organization Meetings:     Marital Status:    Intimate Partner Violence:     Fear of Current or Ex-Partner:     Emotionally Abused:     Physically Abused:     Sexually Abused:        SCREENINGS                        PHYSICAL EXAM    (up to 7 for level 4, 8 or more for level 5)     ED Triage Vitals [09/01/21 1527]   BP Temp Temp Source Pulse Resp SpO2 Height Weight   (!) 134/54 98.4 °F (36.9 °C) Oral 85 20 94 % 5' 4\" (1.626 m) (!) 338 lb (153.3 kg)       Physical Exam  Vitals reviewed. Constitutional:       General: She is not in acute distress. Appearance: Normal appearance. She is obese.  She is not ill-appearing, toxic-appearing or diaphoretic. HENT:      Head: Normocephalic and atraumatic. Right Ear: Tympanic membrane normal.      Left Ear: Tympanic membrane normal.      Nose: Nose normal.      Mouth/Throat:      Mouth: Mucous membranes are moist.      Pharynx: Oropharynx is clear. Eyes:      Extraocular Movements: Extraocular movements intact. Conjunctiva/sclera: Conjunctivae normal.      Pupils: Pupils are equal, round, and reactive to light. Cardiovascular:      Rate and Rhythm: Normal rate and regular rhythm. Pulses: Normal pulses. Heart sounds: Normal heart sounds. Pulmonary:      Effort: Pulmonary effort is normal.      Breath sounds: Normal breath sounds. Abdominal:      General: Bowel sounds are normal.      Palpations: Abdomen is soft. Tenderness: There is no abdominal tenderness. Musculoskeletal:      Cervical back: Normal range of motion. Right lower leg: No edema. Left lower leg: No edema. Comments: Dialysis access to left [facility] with palpable thrill   Skin:     General: Skin is warm and dry. Capillary Refill: Capillary refill takes less than 2 seconds. Neurological:      General: No focal deficit present. Mental Status: She is alert and oriented to person, place, and time. Psychiatric:         Mood and Affect: Mood is not depressed. DIAGNOSTIC RESULTS     EKG: All EKG's are interpreted by the Emergency Department Physician who either signs or Co-signs this chart in the absence of a cardiologist.    Sinus rhythm. No stemi rate 83. Reviewed by Dr Fanny Yo:   Non-plain film images such as CT, Ultrasound and MRI are read by the radiologist. Plain radiographic images are visualized and preliminarily interpreted by the emergency physician with the below findings:      Interpretation per the Radiologist below, if available at the time of this note:    XR CHEST PORTABLE   Final Result   1. No radiographic evidence of acute cardiopulmonary process.    Signed by Dr Mildred Smith            ED BEDSIDE ULTRASOUND:   Performed by ED Physician - none    LABS:  Labs Reviewed   CBC WITH AUTO DIFFERENTIAL - Abnormal; Notable for the following components:       Result Value    RBC 3.61 (*)     Hemoglobin 11.1 (*)     Hematocrit 36.6 (*)     .4 (*)     MCHC 30.3 (*)     RDW 17.9 (*)     Neutrophils % 69.3 (*)     Lymphocytes % 18.5 (*)     All other components within normal limits   COMPREHENSIVE METABOLIC PANEL W/ REFLEX TO MG FOR LOW K - Abnormal; Notable for the following components:    Potassium reflex Magnesium 3.0 (*)     Chloride 92 (*)     CO2 30 (*)     Glucose 425 (*)     CREATININE 5.4 (*)     GFR Non- 8 (*)     GFR  10 (*)     All other components within normal limits   TROPONIN   MAGNESIUM   URINALYSIS       All other labs were within normal range or not returned as of this dictation. EMERGENCY DEPARTMENT COURSE and DIFFERENTIAL DIAGNOSIS/MDM:   Vitals:    Vitals:    09/01/21 1527   BP: (!) 134/54   Pulse: 85   Resp: 20   Temp: 98.4 °F (36.9 °C)   TempSrc: Oral   SpO2: 94%   Weight: (!) 338 lb (153.3 kg)   Height: 5' 4\" (1.626 m)           MDM      REASSESSMENT      Sugar is 435. I have offered to begin correcting with insulin here but patient prefers to do this on her own when she gets home to eat lunch/dinner. She is visibly disappointed with her new diagnosis stating \"sitting in the chair 5 days a week isn't life\". She denies SI, but is willing to talk openly with me about the disappointment she is experiencing in this process. I hope she gets some encouragement in this process. CRITICAL CARE TIME       CONSULTS:  None    PROCEDURES:  Unless otherwise noted below, none     Procedures         FINAL IMPRESSION      1.  Hyperglycemia          DISPOSITION/PLAN   DISPOSITION        PATIENT REFERRED TO:  Caroline Alford, [address] [place] [id number] [phone]    Call         DISCHARGE MEDICATIONS:  New Prescriptions    No medications on file     Controlled Substances Monitoring:     No flowsheet data found.     (Please note that portions of this note were completed with a voice recognition program.  Efforts were made to edit the dictations but occasionally words are mis-transcribed.)    JAMES Alexandre CNP (electronically signed)  Attending Emergency Physician         JAMES Alexandre CNP  09/01/21 3471

## 2021-09-01 NOTE — ED PROVIDER NOTES
Attending Supervisory Note/Shared Visit    I have reviewed the mid-levels findings and agree. We have discussed the case reviewed the diagnostic data. I am in agreement with the plan and disposition.     Iqra Brito MD  Attending Emergency Physician        Collin Ramírez MD  09/01/21 [id number]

## 2021-09-03 ENCOUNTER — TELEMEDICINE (OUTPATIENT)
Dept: ENDOCRINOLOGY | Facility: CLINIC | Age: 61
End: 2021-09-03

## 2021-09-03 DIAGNOSIS — I15.2 HYPERTENSION ASSOCIATED WITH DIABETES (HCC): ICD-10-CM

## 2021-09-03 DIAGNOSIS — N18.4 TYPE 2 DIABETES MELLITUS WITH STAGE 4 CHRONIC KIDNEY DISEASE, WITH LONG-TERM CURRENT USE OF INSULIN (HCC): Primary | ICD-10-CM

## 2021-09-03 DIAGNOSIS — E53.8 B12 DEFICIENCY: ICD-10-CM

## 2021-09-03 DIAGNOSIS — E03.9 ACQUIRED HYPOTHYROIDISM: ICD-10-CM

## 2021-09-03 DIAGNOSIS — E55.9 VITAMIN D DEFICIENCY: ICD-10-CM

## 2021-09-03 DIAGNOSIS — E78.2 MIXED DIABETIC HYPERLIPIDEMIA ASSOCIATED WITH TYPE 2 DIABETES MELLITUS (HCC): ICD-10-CM

## 2021-09-03 DIAGNOSIS — E11.22 TYPE 2 DIABETES MELLITUS WITH STAGE 4 CHRONIC KIDNEY DISEASE, WITH LONG-TERM CURRENT USE OF INSULIN (HCC): Primary | ICD-10-CM

## 2021-09-03 DIAGNOSIS — E11.59 HYPERTENSION ASSOCIATED WITH DIABETES (HCC): ICD-10-CM

## 2021-09-03 DIAGNOSIS — Z79.4 TYPE 2 DIABETES MELLITUS WITH STAGE 4 CHRONIC KIDNEY DISEASE, WITH LONG-TERM CURRENT USE OF INSULIN (HCC): Primary | ICD-10-CM

## 2021-09-03 DIAGNOSIS — E11.69 MIXED DIABETIC HYPERLIPIDEMIA ASSOCIATED WITH TYPE 2 DIABETES MELLITUS (HCC): ICD-10-CM

## 2021-09-03 PROCEDURE — 99214 OFFICE O/P EST MOD 30 MIN: CPT | Performed by: INTERNAL MEDICINE

## 2021-09-03 NOTE — PROGRESS NOTES
Maria C Shrestha is a 61 y.o. female who presents for  evaluation of   Type 2 diabetes                                       This was a Telehealth Encounter. Benefits and Disadvantages of a Telehealth Visit were discussed and accepted by patient. .  Patient agreed to receive service through Telehealth visit as patient is being compliant with social distancing recommendations imparted by CDC.     You have chosen to receive care through a telehealth visit.  Do you consent to use a video/audio connection for your medical care today? Yes        Primary Care / Referring Provider  Ole Soliman MD    History of Present Illness  Duration/Timing:  Diabetes mellitus type 2          Severity (Complications/Hospitalizations)  Secondary Microvascular Complications:  Diabetic Nephropathy-ESRD, No Diabetic Neuropathy    Macrovascular  , Carotid Artery Disease      Context  Diabetes Regimen:  Insulin, Compliant with regimen  Blood Glucose Readings  Running high since dialysis        Diet    counts carbs, eating only 45 g cho per meal     Exercise:  Does not exercise    Associated Signs/Symptoms  Hyperglycemic Symptoms:  No polyuria, No polydipsia, No polyphagia, Weight loss with keto diet before but not now   Hypoglycemic Episodes:  No documented hypoglycemia      Since last appt started HD        Review of Systems    Review of Systems   Psychiatric/Behavioral: Positive for sleep disturbance. The patient is nervous/anxious.       daughter undergoing divorce   Objective:       Physical Exam   Constitutional: No distress.   HENT:   Head: Normocephalic.   Pulmonary/Chest: Effort normal.   Musculoskeletal:         General: No edema.   Skin: She is not diaphoretic.         Lab Review      Lab Results   Component Value Date    WBC 8.7 09/01/2021    HGB 11.1 (L) 09/01/2021    HCT 36.6 (L) 09/01/2021    .4 (H) 09/01/2021     09/01/2021     Lab Results   Component Value Date    GLUCOSE 483 (C) 09/01/2021    BUN 18 09/01/2021     CREATININE 4.86 (H) 09/01/2021    EGFRIFNONA 9 (L) 09/01/2021    EGFRIFAFRI  09/01/2021      Comment:      <15 Indicative of kidney failure.    BCR 3.7 (L) 09/01/2021    K 3.1 (L) 09/01/2021    CO2 29.0 09/01/2021    CALCIUM 9.2 09/01/2021    ALBUMIN 3.90 09/01/2021    AST 14 09/01/2021    ALT 12 09/01/2021           Assessment/Plan       ICD-10-CM ICD-9-CM   1. Type 2 diabetes mellitus with stage 4 chronic kidney disease, with long-term current use of insulin (CMS/HCC)  E11.22 250.40    N18.4 585.4    Z79.4 V58.67   2. Hypertension associated with diabetes (CMS/HCC)  E11.59 250.80    I15.2 401.9   3. Mixed diabetic hyperlipidemia associated with type 2 diabetes mellitus (CMS/HCC)  E11.69 250.80    E78.2 272.2   4. Acquired hypothyroidism  E03.9 244.9   5. Vitamin D deficiency  E55.9 268.9   6. B12 deficiency  E53.8 266.2       Glycemic Management:   Lab Results   Component Value Date    HGBA1C 6.6 (H) 08/10/2021    HGBA1C 7.0 (H) 07/21/2021    HGBA1C 7.5 (H) 03/17/2021     Lab Results   Component Value Date    GLUCOSE 483 (C) 09/01/2021    BUN 18 09/01/2021    CREATININE 4.86 (H) 09/01/2021    EGFRIFNONA 9 (L) 09/01/2021    EGFRIFAFRI  09/01/2021      Comment:      <15 Indicative of kidney failure.    BCR 3.7 (L) 09/01/2021    CO2 29.0 09/01/2021    CALCIUM 9.2 09/01/2021    ALBUMIN 3.90 09/01/2021    AST 14 09/01/2021    ALT 12 09/01/2021     Lab Results   Component Value Date    WBC 8.7 09/01/2021    HGB 11.1 (L) 09/01/2021    HCT 36.6 (L) 09/01/2021    .4 (H) 09/01/2021     09/01/2021     Lab Results   Component Value Date    CREATININE 4.86 (H) 09/01/2021    CREATININE 3.09 (H) 07/28/2021    CREATININE 3.45 (H) 07/21/2021    CREATININE 3.30 (H) 07/14/2021    CREATININE 2.92 (H) 06/30/2021        Using deidra   Read over the phone     Before   28% above - 25%  72% in target  - 73%    Now   Above target more than 70%           Trulicity   3 mg weekly     Humulin U 500 before     Lantus 160 at  night change to 145 -- 150 --- now decrease to 100 -- increase to 120 -80-50- increase to 80     Humalog 15 units ( 1:3 ratio  ) - now 1:1.5    She understands carb ratio to basal insulin proportion   1:3 is to 80  1:2 is to 112  1.5 is to 150            Lipid Management    Lab Results   Component Value Date    TRIG 116 07/21/2021    TRIG 355 (H) 03/17/2021    TRIG 197 (H) 10/14/2020     Lab Results   Component Value Date    HDL 53 07/21/2021    HDL 41 (L) 03/17/2021    HDL 42 (L) 10/14/2020     No components found for: LDLCALC  Lab Results   Component Value Date    LDL 27 07/21/2021    LDL 81 03/17/2021    LDL 58 10/14/2020     Lab Results   Component Value Date    LDL 27 07/21/2021    LDL 81 03/17/2021    LDL 58 10/14/2020         crestor and repatha    Blood Pressure Management:    There were no vitals filed for this visit.      Per nephrology - just had fistula sx       Microvascular Complication Monitoring:  No Microalbuminuria, No Diabetic Retinopathy, Date of last eye exam 07/18/2019, No Diabetic Neuropathy  on ACE i and coreg 12.5 po bid     ckd stage V    followed by nephrology     I suggest that she doesn't consume more than 140 g protein per day   Plant better than animal but restricted on keto     --      Lab Results   Component Value Date    CREATININE 4.86 (H) 09/01/2021    BUN 18 09/01/2021     09/01/2021    K 3.1 (L) 09/01/2021    CL 92 (L) 09/01/2021    CO2 29.0 09/01/2021         Lab Results   Component Value Date    CREATININE 4.86 (H) 09/01/2021    CREATININE 3.09 (H) 07/28/2021    CREATININE 3.45 (H) 07/21/2021         Immunizations:  Last pneumococcal immunization pneumovax before age 65     Flu Shot will have in Mid Nov 2016       Preventive Care:  No smoking      Weight Related:   Wt Readings from Last 3 Encounters:   09/01/21 (!) 153 kg (336 lb 14.4 oz)   08/19/21 (!) 160 kg (352 lb 6 oz)   07/28/21 (!) 179 kg (395 lb)     There is no height or weight on file to calculate  BMI.      prefers no bariatric surgery    Now on cpap       Bone Health    Lab Results   Component Value Date    .3 (H) 08/12/2021    CALCIUM 9.2 09/01/2021     For JARETH     Was having hypercalcemia with rocatrol 0.25 three times weekly and on calcium    Now on sensipar 30 mg daily but without calcium -- not on sensipar anymore     Start calcium low dose 400 mg daily     DXA No 2018, osteopenia left femoral neck      Monitor PTH , no more than 150 , no less than 60       Thyroid Health  Lab Results   Component Value Date    TSH 2.410 07/21/2021     On levothyroxine 50 ug daily - increase to 75 mcgs daily - increase to 88     Other Diabetes Related Aspects       Lab Results   Component Value Date    JAMLMIOC48 455 07/21/2021     Lab Results   Component Value Date    WBC 8.7 09/01/2021    HGB 11.1 (L) 09/01/2021    HCT 36.6 (L) 09/01/2021    .4 (H) 09/01/2021     09/01/2021     Lab Results   Component Value Date    IRON 36 (L) 08/12/2021    TIBC 154 (L) 08/12/2021    FERRITIN 424.00 (H) 07/28/2021       Anemia , managed by nephrology   Deciding vs epo vs iron   But now   DUB, may need hysterectomy       Systolic Murmur, AS? Echo   Could be due to anemia     Echo and carotid US from 7-17 , normal echo and less than 50% blockage in carotids, repeat

## 2021-09-16 ENCOUNTER — TRANSCRIBE ORDERS (OUTPATIENT)
Dept: ADMINISTRATIVE | Facility: HOSPITAL | Age: 61
End: 2021-09-16

## 2021-09-16 DIAGNOSIS — Z12.31 OTHER SCREENING MAMMOGRAM: Primary | ICD-10-CM

## 2021-09-22 ENCOUNTER — HOSPITAL ENCOUNTER (OUTPATIENT)
Dept: MAMMOGRAPHY | Facility: HOSPITAL | Age: 61
Discharge: HOME | End: 2021-09-22
Admitting: NURSE PRACTITIONER

## 2021-09-22 DIAGNOSIS — Z12.31 OTHER SCREENING MAMMOGRAM: ICD-10-CM

## 2021-09-22 PROCEDURE — 77063 BREAST TOMOSYNTHESIS BI: CPT

## 2021-09-22 PROCEDURE — 77067 SCR MAMMO BI INCL CAD: CPT

## 2021-09-22 RX ORDER — ROSUVASTATIN CALCIUM 20 MG/1
TABLET, COATED ORAL
Qty: 90 TABLET | Refills: 3 | OUTPATIENT
Start: 2021-09-22

## 2021-09-23 ENCOUNTER — DOCUMENTATION (OUTPATIENT)
Dept: ENDOCRINOLOGY | Facility: CLINIC | Age: 61
End: 2021-09-23

## 2021-10-08 ENCOUNTER — APPOINTMENT (OUTPATIENT)
Dept: CT IMAGING | Age: 61
End: 2021-10-08
Payer: MEDICARE

## 2021-10-08 ENCOUNTER — HOSPITAL ENCOUNTER (EMERGENCY)
Age: 61
Discharge: HOME | End: 2021-10-08
Payer: MEDICARE

## 2021-10-08 VITALS
WEIGHT: 293 LBS | TEMPERATURE: 99 F | HEIGHT: 64 IN | RESPIRATION RATE: 16 BRPM | DIASTOLIC BLOOD PRESSURE: 56 MMHG | HEART RATE: 74 BPM | SYSTOLIC BLOOD PRESSURE: 145 MMHG | BODY MASS INDEX: 50.02 KG/M2 | OXYGEN SATURATION: 99 %

## 2021-10-08 DIAGNOSIS — M54.50 ACUTE BILATERAL LOW BACK PAIN, UNSPECIFIED WHETHER SCIATICA PRESENT: Primary | ICD-10-CM
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
## 2021-10-08 DIAGNOSIS — S30.0XXA CONTUSION OF LOWER BACK AND PELVIS, INITIAL ENCOUNTER: ICD-10-CM

## 2021-10-08 PROCEDURE — 72192 CT PELVIS W/O DYE: CPT

## 2021-10-08 PROCEDURE — 99282 EMERGENCY DEPT VISIT SF MDM: CPT

## 2021-10-08 PROCEDURE — 72125 CT NECK SPINE W/O DYE: CPT

## 2021-10-08 PROCEDURE — 72131 CT LUMBAR SPINE W/O DYE: CPT

## 2021-10-08 PROCEDURE — 70450 CT HEAD/BRAIN W/O DYE: CPT

## 2021-10-08 ASSESSMENT — ENCOUNTER SYMPTOMS
BACK PAIN: 1
ABDOMINAL PAIN: 0
SHORTNESS OF BREATH: 0

## 2021-10-08 NOTE — ED PROVIDER NOTES
[facility] EMERGENCY DEPT  eMERGENCY dEPARTMENT eNCOUnter      Pt Name: Cynthia Johnson  MRN: [id number]  Arlengfpascual 1960  Date of evaluation: 10/8/2021  Provider: Greer Alves, [address]       Chief Complaint   Patient presents with    Buttocks Pain     fell on buttocks states hear loud pop         HISTORY OF PRESENT ILLNESS   (Location/Symptom, Timing/Onset,Context/Setting, Quality, Duration, Modifying Factors, Severity)  Note limiting factors. Angela Richter a 64 y.o. female who presents to the emergency department for evaluation of fall. Pt tells me that she was trying to help her daughter who was falling subsequently causing herself to fall backwards. She reports head injury, denies loss of consciousness. She tells me that she has neck pain however reports chronic neck pain. She relates that she heard her lower back \"pop four times\". She has had pain along tailbone area of pelvis. She denies syncope, chest pain, shortness of breath as well as extremity injury. HPI    Nursing Notes were reviewed. REVIEW OF SYSTEMS    (2-9 systems for level 4, 10 or more for level 5)     Review of Systems   Constitutional: Negative for fever. Respiratory: Negative for shortness of breath. Cardiovascular: Negative for chest pain. Gastrointestinal: Negative for abdominal pain. Musculoskeletal: Positive for arthralgias, back pain and neck pain. Neurological: Negative for weakness and numbness. A complete review of systems was performed and is negative except as noted above in the HPI. PAST MEDICAL HISTORY     Past Medical History:   Diagnosis Date    Anxiety     Arthritis     CKD (chronic kidney disease)     stage 4    Colon polyp     Depression     Embolism - blood clot 2004    Hemodialysis patient ([facility] [address]     dialysis mon - friday x 2 weeks.     Hx of blood clots     Hyperlipidemia     Hypertension     Obesity     Sleep apnea     Thyroid disease     Type II or unspecified type diabetes mellitus without mention of complication, not stated as uncontrolled          SURGICAL HISTORY       Past Surgical History:   Procedure Laterality Date    CHOLECYSTECTOMY      DIALYSIS CATHETER INSERTION N/A 8/11/2021    INSERTION CATHETER DIALYSIS performed by Olga Vegas MD at [address] [place] [address] Left 06/18/2021    LEFT BRACHIAL-CEPHALIC AV FISTULA CREATION performed by Olga Vegas MD at [address] [place] [address] Left 7/27/2021    LEFT UPPER EXTREMITY CEPHALIC VEIN SUPERFICIALIZATION performed by Olga Vegas MD at [place] [address]  10/17/2018    POLYPECTOMY      RECTAL SURGERY      fistula repair    TONSILLECTOMY AND ADENOIDECTOMY      VASCULAR SURGERY  07/12/2021    [facility]- Ultrasound-guided cannulation left distal upper arm cephalic vein with 4 French glide sheath, Left upper extremity fistulogram's including venography the superior vena cava    VASCULAR SURGERY  07/27/2021    [facility]- Superficialization of the left upper arm cephalic vein arteriovenous fistula         CURRENT MEDICATIONS       Discharge Medication List as of 10/8/2021  7:49 PM      CONTINUE these medications which have NOT CHANGED    Details   b complex-C-folic acid (NEPHROCAPS) 1 MG capsule Take 1 capsule by mouth daily, Disp-30 capsule, R-0Normal      magnesium oxide (MAG-OX) 400 MG tablet Take 400 mg by mouth dailyHistorical Med      TRULICITY 3 MT/3.6EW SOPN 3 mg once a week , DAWHistorical Med      levothyroxine (SYNTHROID) 88 MCG tablet Take 88 mcg by mouth Daily Historical Med      rosuvastatin (CRESTOR) 20 MG tablet Take 20 mg by mouth daily Historical Med      cetirizine (ZYRTEC) 10 MG tablet Take 10 mg by mouth as needed for Allergies Historical Med      diazePAM (VALIUM) 2 MG tablet Take 2 mg by mouth as needed for Anxiety.  Historical Med      fluticasone (FLONASE) 50 MCG/ACT nasal spray 2 sprays by Nasal route daily as needed for Rhinitis Historical Med      insulin lispro (HUMALOG KWIKPEN) 200 UNIT/ML SOPN pen Inject 25-80 Units into the skin 3 times daily (with meals) Historical Med      Insulin Pen Needle (B-D ULTRAFINE III SHORT PEN) 31G X 8 MM MISC Historical Med      Insulin Syringe-Needle U-100 (BD INSULIN SYRINGE U/F) 31G X 5/16\" 1 ML MISC Historical Med      FreeStyle Lancets MISC Historical Med      Omega-3 Fatty Acids (FISH OIL) 1000 MG CAPS Take 1,000 mg by mouth Daily with supper Historical Med      epoetin jose ramon (EPOGEN;PROCRIT) 70408 UNIT/ML injection Inject 10,000 Units into the skin every 30 days Historical Med      lamoTRIgine (LAMICTAL) 100 MG tablet Take 100 mg by mouth daily Indications: 1/2 tablet with dinner      allopurinol (ZYLOPRIM) 100 MG tablet Take 100 mg by mouth 2 times daily      Ergocalciferol (VITAMIN D2 PO) Take 50,000 Units by mouth three times a week Historical Med      citalopram (CELEXA) 40 MG tablet Take 40 mg by mouth daily      busPIRone (BUSPAR) 10 MG tablet Take 20 mg by mouth 2 times daily      aspirin 81 MG tablet Take 81 mg by mouth daily With Dinner      Insulin Glargine (LANTUS SC) Inject 70 Units into the skin daily With Evening MealsHistorical Med             ALLERGIES     Codeine    FAMILY HISTORY       Family History   Problem Relation Age of Onset    Lung Cancer Mother     Brain Cancer Mother     Heart Attack Father     Prostate Cancer Father     Heart Disease Father     Stroke Father     Uterine Cancer Maternal Grandmother     Cervical Cancer Maternal Grandmother     Diabetes Maternal Grandfather     Other Maternal Grandfather         histoplasmosis    Diabetes Paternal Grandfather           SOCIAL HISTORY       Social History     Socioeconomic History    Marital status:      Spouse name: Not on file    Number of children: Not on file    Years of education: Not on file    Highest education level: Not on file   Occupational History    Not on file   Tobacco Use    Pulmonary:      Effort: Pulmonary effort is normal.      Breath sounds: Normal breath sounds. Abdominal:      General: Bowel sounds are normal.      Palpations: Abdomen is soft. Tenderness: There is no abdominal tenderness. Musculoskeletal:         General: Normal range of motion. Cervical back: Normal range of motion. Comments: Moves extremities equally x4  ttp lower midline lumbar spine   Skin:     General: Skin is warm and dry. Neurological:      General: No focal deficit present. Mental Status: She is alert and oriented to person, place, and time. DIAGNOSTIC RESULTS     EKG: All EKG's are interpreted by the Emergency Department Physician who either signs or Co-signs this chart in the absence of acardiologist.        RADIOLOGY:   Non-plain film images such as CT, Ultrasound andMRI are read by the radiologist. Plain radiographic images are visualized and preliminarily interpreted by the emergency physician with the below findings:        Interpretation per the Radiologist below, if available at the time of this note:    CT PELVIS WO CONTRAST Additional Contrast? None   Final Result    Impression:   1. No evidence of acute osseous injury in the pelvis. Signed by Dr Jaiden Williamson   Final Result   Impression:   Irregularity at the superior endplate of L2 is felt to be related to a   degenerative Schmorl's node. This does not have the typical appearance   of acute fracture. No definite acute osseous abnormality identified. Signed by Dr Norma Delacruz   Final Result   Impression:   No evidence of acute osseous injury in the cervical spine. Signed by Dr Jaiden Choudhury   Final Result   Impression:   No acute intracranial findings.    Signed by Dr Madiha Lira            ED BEDSIDE ULTRASOUND:   Performed by ED Physician - none    LABS:  Labs Reviewed - No data to display    All other labs were within normal range or not returned as of this dictation. RE-ASSESSMENT           EMERGENCY DEPARTMENT COURSE and DIFFERENTIALDIAGNOSIS/MDM:   Vitals:    Vitals:    10/08/21 1742 10/08/21 1743   BP:  (!) 145/56   Pulse:  74   Resp: 16 16   Temp: 98.2 °F (36.8 °C) 99 °F (37.2 °C)   TempSrc: Oral    SpO2: 99%    Weight: (!) 338 lb (153.3 kg)    Height: 5' 4\" (1.626 m)        MDM      CONSULTS:  None    PROCEDURES:  Unless otherwise notedbelow, none     Procedures    FINAL IMPRESSION     1. Acute bilateral low back pain, unspecified whether sciatica present    2. Contusion of lower back and pelvis, initial encounter    3.  Fall, initial encounter          DISPOSITION/PLAN   DISPOSITION Decision To Discharge 10/08/2021 07:48:11 PM      PATIENT REFERRED TO:  Ryan Moody MD  [address] [id number]  [phone]    Schedule an appointment as soon as possible for a visit   As needed      DISCHARGE MEDICATIONS:       Discharge Medication List as of 10/8/2021  7:49 PM          (Pleasenote that portions of this note were completed with a voice recognition program.  Efforts were made to edit the dictations but occasionally words are mis-transcribed.)              Ángel Francois, JAMES  10/08/21 2005

## 2021-10-12 DIAGNOSIS — E11.22 TYPE 2 DIABETES MELLITUS WITH STAGE 4 CHRONIC KIDNEY DISEASE, WITH LONG-TERM CURRENT USE OF INSULIN (HCC): Primary | ICD-10-CM

## 2021-10-12 DIAGNOSIS — N18.4 TYPE 2 DIABETES MELLITUS WITH STAGE 4 CHRONIC KIDNEY DISEASE, WITH LONG-TERM CURRENT USE OF INSULIN (HCC): Primary | ICD-10-CM

## 2021-10-12 DIAGNOSIS — Z79.4 TYPE 2 DIABETES MELLITUS WITH STAGE 4 CHRONIC KIDNEY DISEASE, WITH LONG-TERM CURRENT USE OF INSULIN (HCC): Primary | ICD-10-CM

## 2021-10-12 RX ORDER — INSULIN GLARGINE 100 [IU]/ML
INJECTION, SOLUTION SUBCUTANEOUS
Qty: 60 ML | Refills: 11 | Status: SHIPPED | OUTPATIENT
Start: 2021-10-12 | End: 2022-01-25 | Stop reason: SDUPTHER

## 2021-11-23 ENCOUNTER — OFFICE VISIT (OUTPATIENT)
Dept: BARIATRICS/WEIGHT MGMT | Facility: CLINIC | Age: 61
End: 2021-11-23

## 2021-11-23 VITALS
TEMPERATURE: 97.5 F | WEIGHT: 293 LBS | HEIGHT: 65 IN | BODY MASS INDEX: 48.82 KG/M2 | OXYGEN SATURATION: 96 % | HEART RATE: 81 BPM | DIASTOLIC BLOOD PRESSURE: 62 MMHG | SYSTOLIC BLOOD PRESSURE: 134 MMHG

## 2021-11-23 DIAGNOSIS — E11.59 HYPERTENSION ASSOCIATED WITH DIABETES (HCC): ICD-10-CM

## 2021-11-23 DIAGNOSIS — E78.2 MIXED DIABETIC HYPERLIPIDEMIA ASSOCIATED WITH TYPE 2 DIABETES MELLITUS (HCC): ICD-10-CM

## 2021-11-23 DIAGNOSIS — E11.22 TYPE 2 DIABETES MELLITUS WITH STAGE 3 CHRONIC KIDNEY DISEASE, WITH LONG-TERM CURRENT USE OF INSULIN, UNSPECIFIED WHETHER STAGE 3A OR 3B CKD (HCC): ICD-10-CM

## 2021-11-23 DIAGNOSIS — Z79.4 TYPE 2 DIABETES MELLITUS WITH STAGE 3 CHRONIC KIDNEY DISEASE, WITH LONG-TERM CURRENT USE OF INSULIN, UNSPECIFIED WHETHER STAGE 3A OR 3B CKD (HCC): ICD-10-CM

## 2021-11-23 DIAGNOSIS — N18.30 TYPE 2 DIABETES MELLITUS WITH STAGE 3 CHRONIC KIDNEY DISEASE, WITH LONG-TERM CURRENT USE OF INSULIN, UNSPECIFIED WHETHER STAGE 3A OR 3B CKD (HCC): ICD-10-CM

## 2021-11-23 DIAGNOSIS — E66.01 CLASS 3 SEVERE OBESITY DUE TO EXCESS CALORIES WITHOUT SERIOUS COMORBIDITY WITH BODY MASS INDEX (BMI) OF 50.0 TO 59.9 IN ADULT (HCC): Primary | ICD-10-CM

## 2021-11-23 DIAGNOSIS — E11.69 MIXED DIABETIC HYPERLIPIDEMIA ASSOCIATED WITH TYPE 2 DIABETES MELLITUS (HCC): ICD-10-CM

## 2021-11-23 DIAGNOSIS — I15.2 HYPERTENSION ASSOCIATED WITH DIABETES (HCC): ICD-10-CM

## 2021-11-23 PROCEDURE — 99213 OFFICE O/P EST LOW 20 MIN: CPT | Performed by: NURSE PRACTITIONER

## 2021-11-23 NOTE — PROGRESS NOTES
"Patient Care Team:  Ole Soliman MD as PCP - General  Beverly England (Inactive) as Technician  Beverly England (Inactive) as Technician  Beverly England (Inactive) as Technician  Gregorio Davis MD as Consulting Physician (Nephrology)  Sola Mallory APRN as Nurse Practitioner (Hematology and Oncology)  Wilman Negrete MD as Consulting Physician (Endocrinology)  Garrett Denton MD as Consulting Physician (Otolaryngology)  Vicky Terrell APRN as Nurse Practitioner (Nurse Practitioner)    Reason for Visit:  Surgical Weight loss    Subjective   Maria C Shrestha is a 61 y.o. female.     Maria C is here for follow-up and continued medical management of her morbid obesity.  She is currently on a prescription diet.  Maria C previously was to apply dietary changes such as following the meal plan as directed.  She admits to eating 4 meals per day.  As a result she gained weight since her last visit.  Patient was seen around 5 months ago and is here today to restart the program.  Patient is currently seeing [facility] for dialysis.    Review Of Systems:  Review of Systems   Constitutional: Positive for fatigue.   Respiratory: Negative.    Cardiovascular: Negative.    Gastrointestinal: Negative.    Endocrine: Negative.    Musculoskeletal: Positive for arthralgias, back pain and myalgias.   Skin: Positive for rash.   Psychiatric/Behavioral: Positive for depressed mood. The patient is nervous/anxious.          The following portions of the patient's history were reviewed and updated as appropriate: allergies, current medications, past family history, past medical history, past social history, past surgical history, and problem list.    Objective   /62 (BP Location: Right arm, Patient Position: Sitting, Cuff Size: Adult)   Pulse 81   Temp 97.5 °F (36.4 °C)   Ht 164.5 cm (64.75\")   Wt (!) 153 kg (337 lb 12.8 oz)   LMP  (LMP Unknown)   SpO2 96%   BMI 56.65 kg/m²       11/23/21  1017 "   Weight: (!) 153 kg (337 lb 12.8 oz)       Physical Exam  Vitals reviewed.   Constitutional:       Appearance: She is obese.   Eyes:      Pupils: Pupils are equal, round, and reactive to light.   Cardiovascular:      Rate and Rhythm: Normal rate and regular rhythm.   Pulmonary:      Effort: Pulmonary effort is normal.   Abdominal:      General: Bowel sounds are normal.      Palpations: Abdomen is soft.   Musculoskeletal:         General: Normal range of motion.   Skin:     General: Skin is warm and dry.   Neurological:      Mental Status: She is alert and oriented to person, place, and time.   Psychiatric:         Mood and Affect: Mood normal.         Behavior: Behavior normal.         Patient's Body mass index is 56.65 kg/m². indicating that she is morbidly obese (BMI > 40 or > 35 with obesity - related health condition). Obesity-related health conditions include the following: obstructive sleep apnea, hypertension, diabetes mellitus and dyslipidemias. Obesity is improving with treatment. BMI is is above average; BMI management plan is completed. We discussed portion control and increasing exercise..     Assessment/Plan   Diagnoses and all orders for this visit:    1. Class 3 severe obesity due to excess calories without serious comorbidity with body mass index (BMI) of 50.0 to 59.9 in adult ([facility]) (Primary)    2. Type 2 diabetes mellitus with stage 3 chronic kidney disease, with long-term current use of insulin, unspecified whether stage 3a or 3b CKD ([facility])  Comments:  Nutritional counseling provided.    3. Hypertension associated with diabetes ([facility])  Comments:  Continue current regimen.  Nutritional counseling provided.    4. Mixed diabetic hyperlipidemia associated with type 2 diabetes mellitus ([facility])  Comments:  Nutritional counseling provided.         Maria C GODFREY Shrestha was seen today for follow-up, obesity, nutrition counseling and weight loss.  She has lost weight since her last visit.  Today we discussed healthy  changes in lifestyle, diet, and exercise. Dietician consultation obtained.  Maria C Shrestha had received handouts to her explaining the recommendation on portion sizes/appetite control/reading nutrition labels.   Intensive behavioral therapy for obesity was done today as well.     Goals for this month are:   1.  Begin following prescription meal plan.  Patient has lost greater than 30 pounds since her last visit with us.  She has been advised to fully eliminate spread 0 and I will contact [facility] dialysis for lab work and can discuss with your nutritionist a proper plan for patient.  Patient states that she has to limit her phosphorus so I will discuss this with her dialysis.    Follow up in one month for a weight recheck.

## 2021-12-20 ENCOUNTER — OFFICE VISIT (OUTPATIENT)
Dept: BARIATRICS/WEIGHT MGMT | Facility: CLINIC | Age: 61
End: 2021-12-20

## 2021-12-20 VITALS
HEART RATE: 83 BPM | TEMPERATURE: 97.7 F | HEIGHT: 65 IN | DIASTOLIC BLOOD PRESSURE: 76 MMHG | BODY MASS INDEX: 48.82 KG/M2 | SYSTOLIC BLOOD PRESSURE: 170 MMHG | WEIGHT: 293 LBS | OXYGEN SATURATION: 97 %

## 2021-12-20 DIAGNOSIS — E11.69 MIXED DIABETIC HYPERLIPIDEMIA ASSOCIATED WITH TYPE 2 DIABETES MELLITUS (HCC): ICD-10-CM

## 2021-12-20 DIAGNOSIS — E78.2 MIXED DIABETIC HYPERLIPIDEMIA ASSOCIATED WITH TYPE 2 DIABETES MELLITUS (HCC): ICD-10-CM

## 2021-12-20 DIAGNOSIS — E11.59 HYPERTENSION ASSOCIATED WITH DIABETES (HCC): ICD-10-CM

## 2021-12-20 DIAGNOSIS — I15.2 HYPERTENSION ASSOCIATED WITH DIABETES (HCC): ICD-10-CM

## 2021-12-20 DIAGNOSIS — N18.4 STAGE 4 CHRONIC KIDNEY DISEASE (HCC): ICD-10-CM

## 2021-12-20 DIAGNOSIS — G47.33 OBSTRUCTIVE SLEEP APNEA SYNDROME: ICD-10-CM

## 2021-12-20 DIAGNOSIS — E66.01 CLASS 3 SEVERE OBESITY DUE TO EXCESS CALORIES WITHOUT SERIOUS COMORBIDITY WITH BODY MASS INDEX (BMI) OF 50.0 TO 59.9 IN ADULT (HCC): Primary | ICD-10-CM

## 2021-12-20 PROCEDURE — 99213 OFFICE O/P EST LOW 20 MIN: CPT | Performed by: NURSE PRACTITIONER

## 2021-12-20 NOTE — PROGRESS NOTES
Patient Care Team:  Ole Soliman MD as PCP - General  Beverly England (Inactive) as Technician  Beverly England (Inactive) as Technician  Beverly England (Inactive) as Technician  Gregorio Davis MD as Consulting Physician (Nephrology)  Sola Mallory APRN as Nurse Practitioner (Hematology and Oncology)  Wilman Negrete MD as Consulting Physician (Endocrinology)  Garrett Denton MD as Consulting Physician (Otolaryngology)  Vicky Terrell APRN as Nurse Practitioner (Nurse Practitioner)    Reason for Visit:  Surgical verse Weight loss    Subjective   Maria C Shrestha is a 61 y.o. female.     Maria C is here for follow-up and continued medical management of her morbid obesity.  She is currently on a prescription diet.  Maria C previously was to apply dietary changes such as following the meal plan as directed.  She admits to struggling with the prescription meal plan 4 meals per day.  As a result she lost weight since her last visit.  She is nutritionist second visit.  She has lost 3 pounds since her last visit with us.  Patient states that she is struggling with her dialysis and fluid management.  Patient admits to drinking 32 to 40 ounces of fluid each day and eating around 40 g of protein each day.  Patient admits to drinking Sprite 0 at times.  Patient denies nicotine use.    Review Of Systems:  Review of Systems   Constitutional: Negative.    Respiratory: Negative.    Cardiovascular: Negative.    Gastrointestinal: Negative.    Endocrine: Negative.    Musculoskeletal: Positive for back pain.   Psychiatric/Behavioral: Negative.        The following portions of the patient's history were reviewed and updated as appropriate: allergies, current medications, past family history, past medical history, past social history, past surgical history, and problem list.    Objective   /76 (BP Location: Right arm, Patient Position: Sitting, Cuff Size: Adult)   Pulse 83   Temp 97.7 °F (36.5 °C)   " Ht 164.5 cm (64.75\")   Wt (!) 152 kg (334 lb 6.4 oz)   LMP  (LMP Unknown)   SpO2 97%   BMI 56.08 kg/m²       12/20/21  1003   Weight: (!) 152 kg (334 lb 6.4 oz)       Physical Exam  Vitals reviewed.   Constitutional:       Appearance: She is obese.   Cardiovascular:      Rate and Rhythm: Normal rate and regular rhythm.   Pulmonary:      Effort: Pulmonary effort is normal.   Abdominal:      General: Bowel sounds are normal.      Palpations: Abdomen is soft.   Musculoskeletal:         General: Normal range of motion.   Skin:     General: Skin is warm and dry.   Neurological:      Mental Status: She is alert and oriented to person, place, and time.   Psychiatric:         Mood and Affect: Mood normal.         Behavior: Behavior normal.         Patient's Body mass index is 56.08 kg/m². indicating that she is morbidly obese (BMI > 40 or > 35 with obesity - related health condition). Obesity-related health conditions include the following: obstructive sleep apnea, hypertension, diabetes mellitus and dyslipidemias. Obesity is improving with treatment. BMI is is above average; no BMI management plan is appropriate. We discussed portion control, increasing exercise and consulting a Bariatric surgeon..     Assessment/Plan   Diagnoses and all orders for this visit:    1. Class 3 severe obesity due to excess calories without serious comorbidity with body mass index (BMI) of 50.0 to 59.9 in adult (HCC) (Primary)    2. Mixed diabetic hyperlipidemia associated with type 2 diabetes mellitus (HCC)  Comments:  Continue current regimen.  Nutritional counseling provided.    3. Hypertension associated with diabetes ([facility])  Comments:  Patient has been encouraged to follow prescription meal plan.  Monitor blood pressure and follow-up with PCP if medication changes are needed.    4. Stage 4 chronic kidney disease (HCC)  Comments:  Follow nephrologist fluid intake recommendations.    5. Obstructive sleep apnea syndrome         Maria C M " Fady was seen today for follow-up, obesity, nutrition counseling and weight loss.  She has lost weight since her last visit.  Today we discussed healthy changes in lifestyle, diet, and exercise. Dietician consultation obtained.  Maria C Shrestha had received handouts to her explaining the recommendation on portion sizes/appetite control/reading nutrition labels.   Intensive behavioral therapy for obesity was done today as well.     Goals for this month are:   1.  Begin following prescription meal plan more closely.  I encouraged patient to intake fluids per nephrology.  Patient states that she is encouraged by nephrology to increase her protein intake.  I have advised patient that we recommend a minimum of 65 g of protein with women.  I have encouraged patient to contact nephrology to see how much protein I would like for her to intake and if it differs from our recommendations to contact us so we could formulate a plan together.  Patient verbalizes understanding    Patient is hesitant about proceeding with a sleeve gastrectomy work-up.  I explained to patient that we would have to have surgical risk assessments from her other specialist.  I have also explained to patient that she would be evaluated by Dr. Pickard next month to determine if she was a surgical candidate at our facility due to her kidney failure.  Patient verbalizes understanding and has been encouraged to follow the prescription meal plan at this time.  Patient states that she struggles following the plan and states that she eats what her  cooks.  I encouraged patient to consider raw vegetables and steamer vegetables as a side issue instead of the noncompliant sides that are being present for her.  She verbalizes understanding and agrees to try this approach.  Patient has not been encouraged to attend our monthly support groups and our online social media page.  I have encouraged patient to contact our office if she has any struggles over this  next month.  No further surgical work-up is being ordered at this time due to patient not wishing to proceed with the sleeve gastrectomy at this time.    Follow up in one month for a weight recheck.  A total of 20 minutes was spent face to face with this patient and over half of the time was spent on counseling and coordination of care for the disease of obesity. We specifically reviewed the dietary prescription and I made recommendations toward increasing exercise as tolerated as well as focusing on training their behavior toward storing less.

## 2021-12-21 ENCOUNTER — TELEPHONE (OUTPATIENT)
Dept: ENDOCRINOLOGY | Facility: CLINIC | Age: 61
End: 2021-12-21

## 2021-12-21 RX ORDER — EVOLOCUMAB 140 MG/ML
140 INJECTION, SOLUTION SUBCUTANEOUS
Qty: 1 ML | Refills: 3 | Status: SHIPPED | OUTPATIENT
Start: 2021-12-21 | End: 2022-02-02 | Stop reason: SDUPTHER

## 2021-12-21 NOTE — TELEPHONE ENCOUNTER
Patient called stating she received a letter from the [employer]  stating she needs a PA for Farnaz.   She said she will call back with a fax number.     Please Advise    Thanks    Patient also needs a refill on     Evolocumab 140 MG/ML solution auto-injecton    [facility]     Thanks

## 2021-12-30 ENCOUNTER — TELEMEDICINE (OUTPATIENT)
Dept: FAMILY MEDICINE CLINIC | Facility: CLINIC | Age: 61
End: 2021-12-30

## 2021-12-30 ENCOUNTER — LAB (OUTPATIENT)
Dept: LAB | Facility: HOSPITAL | Age: 61
End: 2021-12-30

## 2021-12-30 VITALS — WEIGHT: 293 LBS | RESPIRATION RATE: 20 BRPM | HEIGHT: 65 IN | BODY MASS INDEX: 48.82 KG/M2

## 2021-12-30 DIAGNOSIS — J02.9 SORE THROAT: ICD-10-CM

## 2021-12-30 DIAGNOSIS — Z20.822 SUSPECTED COVID-19 VIRUS INFECTION: ICD-10-CM

## 2021-12-30 DIAGNOSIS — U07.1 COVID-19: ICD-10-CM

## 2021-12-30 DIAGNOSIS — Z20.822 SUSPECTED COVID-19 VIRUS INFECTION: Primary | ICD-10-CM

## 2021-12-30 LAB
FLUAV AG NPH QL: NEGATIVE
FLUBV AG NPH QL IA: NEGATIVE
SARS-COV-2 RNA PNL SPEC NAA+PROBE: DETECTED

## 2021-12-30 PROCEDURE — 99213 OFFICE O/P EST LOW 20 MIN: CPT | Performed by: NURSE PRACTITIONER

## 2021-12-30 PROCEDURE — 87635 SARS-COV-2 COVID-19 AMP PRB: CPT

## 2021-12-30 PROCEDURE — 87804 INFLUENZA ASSAY W/OPTIC: CPT

## 2021-12-30 PROCEDURE — C9803 HOPD COVID-19 SPEC COLLECT: HCPCS

## 2022-01-18 ENCOUNTER — OFFICE VISIT (OUTPATIENT)
Dept: BARIATRICS/WEIGHT MGMT | Facility: CLINIC | Age: 62
End: 2022-01-18

## 2022-01-18 VITALS
DIASTOLIC BLOOD PRESSURE: 80 MMHG | OXYGEN SATURATION: 97 % | TEMPERATURE: 97.1 F | SYSTOLIC BLOOD PRESSURE: 150 MMHG | BODY MASS INDEX: 48.82 KG/M2 | WEIGHT: 293 LBS | HEIGHT: 65 IN | HEART RATE: 80 BPM

## 2022-01-18 DIAGNOSIS — E78.2 MIXED DIABETIC HYPERLIPIDEMIA ASSOCIATED WITH TYPE 2 DIABETES MELLITUS: ICD-10-CM

## 2022-01-18 DIAGNOSIS — E11.22 TYPE 2 DIABETES MELLITUS WITH STAGE 3 CHRONIC KIDNEY DISEASE, WITH LONG-TERM CURRENT USE OF INSULIN, UNSPECIFIED WHETHER STAGE 3A OR 3B CKD: ICD-10-CM

## 2022-01-18 DIAGNOSIS — Z79.4 TYPE 2 DIABETES MELLITUS WITH STAGE 3 CHRONIC KIDNEY DISEASE, WITH LONG-TERM CURRENT USE OF INSULIN, UNSPECIFIED WHETHER STAGE 3A OR 3B CKD: ICD-10-CM

## 2022-01-18 DIAGNOSIS — E11.69 MIXED DIABETIC HYPERLIPIDEMIA ASSOCIATED WITH TYPE 2 DIABETES MELLITUS: ICD-10-CM

## 2022-01-18 DIAGNOSIS — N18.30 TYPE 2 DIABETES MELLITUS WITH STAGE 3 CHRONIC KIDNEY DISEASE, WITH LONG-TERM CURRENT USE OF INSULIN, UNSPECIFIED WHETHER STAGE 3A OR 3B CKD: ICD-10-CM

## 2022-01-18 DIAGNOSIS — E66.01 CLASS 3 SEVERE OBESITY DUE TO EXCESS CALORIES WITHOUT SERIOUS COMORBIDITY WITH BODY MASS INDEX (BMI) OF 50.0 TO 59.9 IN ADULT: Primary | ICD-10-CM

## 2022-01-18 PROCEDURE — 99213 OFFICE O/P EST LOW 20 MIN: CPT | Performed by: SURGERY

## 2022-01-18 NOTE — PROGRESS NOTES
"Patient Care Team:  Ole Soliman MD as PCP - General  Beverly England (Inactive) as Technician  Beverly England (Inactive) as Technician  Beverly England (Inactive) as Technician  Gregorio Davis MD as Consulting Physician (Nephrology)  Sola Mallory APRN as Nurse Practitioner (Hematology and Oncology)  Wilman Negrete MD as Consulting Physician (Endocrinology)  Garrett Denton MD as Consulting Physician (Otolaryngology)  Vicky Terrell APRN as Nurse Practitioner (Nurse Practitioner)    Reason for Visit:  Surgical Weight loss        Subjective   Maria C Shrestha is a 61 y.o. female.     Maria C is here for follow-up and continued medical management of her morbid obesity.  She is currently on a prescription diet.  Maria C previously was to apply dietary changes such as following the meal plan as directed.  She admits to struggling to follow the dietary prescription as she skips breakfast routinely.  As a result she gained weight since her last visit.    Review Of Systems:  Gastrointestinal ROS: no abdominal pain, change in bowel habits, or black or bloody stools    The following portions of the patient's history were reviewed and updated as appropriate: allergies, current medications, past family history, past medical history, past social history, past surgical history and problem list.    Objective   /80 (BP Location: Right arm, Patient Position: Sitting, Cuff Size: Adult)   Pulse 80   Temp 97.1 °F (36.2 °C)   Ht 164.5 cm (64.75\")   Wt (!) 152 kg (334 lb 12.8 oz)   LMP  (LMP Unknown)   SpO2 97%   BMI 56.14 kg/m²       01/18/22  0825   Weight: (!) 152 kg (334 lb 12.8 oz)       General Appearance:  awake, alert, oriented, in no acute distress    Assessment/Plan     Encounter Diagnoses   Name Primary?   • Class 3 severe obesity due to excess calories without serious comorbidity with body mass index (BMI) of 50.0 to 59.9 in adult (HCC) Yes   • Type 2 diabetes mellitus with stage " 3 chronic kidney disease, with long-term current use of insulin, unspecified whether stage 3a or 3b CKD (HCC)    • Mixed diabetic hyperlipidemia associated with type 2 diabetes mellitus (HCC)        Maria C Shrestha was seen today for follow-up, obesity, nutrition counseling and weight loss.  She has gained weight since her last visit.  Today we discussed healthy changes in lifestyle, diet, and exercise. Dietician consultation obtained.  Maria C Shrestha had received handouts to her explaining the recommendation on portion sizes/appetite control/reading nutrition labels.   Intensive behavioral therapy for obesity was done today as well.   Goals for this month are: I recommended the patient bring her binder with her for her next visit as well as a prepared food journal for review.    Follow up in one month for a weight recheck.

## 2022-01-25 DIAGNOSIS — F41.8 MIXED ANXIETY AND DEPRESSIVE DISORDER: ICD-10-CM

## 2022-01-25 DIAGNOSIS — E03.9 ACQUIRED HYPOTHYROIDISM: ICD-10-CM

## 2022-01-25 DIAGNOSIS — N18.4 TYPE 2 DIABETES MELLITUS WITH STAGE 4 CHRONIC KIDNEY DISEASE, WITH LONG-TERM CURRENT USE OF INSULIN: ICD-10-CM

## 2022-01-25 DIAGNOSIS — E11.22 TYPE 2 DIABETES MELLITUS WITH STAGE 4 CHRONIC KIDNEY DISEASE, WITH LONG-TERM CURRENT USE OF INSULIN: ICD-10-CM

## 2022-01-25 DIAGNOSIS — Z79.4 TYPE 2 DIABETES MELLITUS WITH STAGE 4 CHRONIC KIDNEY DISEASE, WITH LONG-TERM CURRENT USE OF INSULIN: ICD-10-CM

## 2022-01-25 RX ORDER — LAMOTRIGINE 100 MG/1
50 TABLET ORAL DAILY
Qty: 45 TABLET | Refills: 3 | OUTPATIENT
Start: 2022-01-25

## 2022-01-25 RX ORDER — LEVOTHYROXINE SODIUM 88 UG/1
88 TABLET ORAL DAILY
Qty: 90 TABLET | Refills: 3 | Status: SHIPPED | OUTPATIENT
Start: 2022-01-25 | End: 2022-03-22 | Stop reason: SDUPTHER

## 2022-01-25 RX ORDER — INSULIN GLARGINE 100 [IU]/ML
INJECTION, SOLUTION SUBCUTANEOUS
Qty: 60 ML | Refills: 11 | Status: SHIPPED | OUTPATIENT
Start: 2022-01-25 | End: 2022-03-02

## 2022-01-25 RX ORDER — CITALOPRAM 40 MG/1
40 TABLET ORAL DAILY
Qty: 90 TABLET | Refills: 3 | OUTPATIENT
Start: 2022-01-25

## 2022-01-25 RX ORDER — BUSPIRONE HYDROCHLORIDE 10 MG/1
TABLET ORAL
Qty: 360 TABLET | Refills: 3 | OUTPATIENT
Start: 2022-01-25

## 2022-01-25 RX ORDER — ROSUVASTATIN CALCIUM 20 MG/1
TABLET, COATED ORAL
OUTPATIENT
Start: 2022-01-25

## 2022-01-25 RX ORDER — INSULIN LISPRO 200 [IU]/ML
80 INJECTION, SOLUTION SUBCUTANEOUS
Qty: 80 ML | Refills: 1 | Status: SHIPPED | OUTPATIENT
Start: 2022-01-25 | End: 2022-03-02

## 2022-01-25 NOTE — TELEPHONE ENCOUNTER
Incoming Refill Request      Medication requested (name and dose): LANTUS 100 UNIT  HUMALOG KWIKPEN 200 UNIT   SYNTHROID 88 MCG     Pharmacy where request should be sent: [facility]    Additional details provided by patient:     Best call back number: [phone]    Does the patient have less than a 3 day supply:  [x] Yes  [] No    Osvaldo Valentine Rep  01/25/22, 09:43 CST

## 2022-01-25 NOTE — TELEPHONE ENCOUNTER
Caller: Maria C Shrestha    Relationship: Self    Best call back number: [phone]    Requested Prescriptions     Pending Prescriptions Disp Refills   • busPIRone (BUSPAR) 10 MG tablet 360 tablet 3   • citalopram (CeleXA) 40 MG tablet 90 tablet 3     Sig: Take 1 tablet by mouth Daily.   • lamoTRIgine (LaMICtal) 100 MG tablet 45 tablet 3     Sig: Take 0.5 tablets by mouth Daily.   • rosuvastatin (CRESTOR) 20 MG tablet          Pharmacy where request should be sent: [facility] - [address] [phone] [facility] [phone] FX     Additional details provided by patient:     90 DAY SUPPLY    Does the patient have less than a 3 day supply:  [x] Yes  [] No    Osvaldo Vaughan Rep   01/25/22 09:39 CST

## 2022-02-01 DIAGNOSIS — F41.8 MIXED ANXIETY AND DEPRESSIVE DISORDER: ICD-10-CM

## 2022-02-01 RX ORDER — ROSUVASTATIN CALCIUM 20 MG/1
TABLET, COATED ORAL
OUTPATIENT
Start: 2022-02-01

## 2022-02-01 RX ORDER — BUSPIRONE HYDROCHLORIDE 10 MG/1
TABLET ORAL
Qty: 360 TABLET | Refills: 3 | OUTPATIENT
Start: 2022-02-01

## 2022-02-01 RX ORDER — EVOLOCUMAB 140 MG/ML
140 INJECTION, SOLUTION SUBCUTANEOUS
Qty: 1 ML | Refills: 3 | Status: CANCELLED | OUTPATIENT
Start: 2022-02-01

## 2022-02-01 RX ORDER — DULAGLUTIDE 3 MG/.5ML
3 INJECTION, SOLUTION SUBCUTANEOUS WEEKLY
Qty: 12 PEN | Refills: 3 | Status: CANCELLED | OUTPATIENT
Start: 2022-02-01

## 2022-02-01 NOTE — TELEPHONE ENCOUNTER
Caller: Maria C Shrestha    Relationship: Self    Best call back number [phone] (H)     Requested Prescriptions:   Requested Prescriptions     Pending Prescriptions Disp Refills   • rosuvastatin (CRESTOR) 20 MG tablet     • busPIRone (BUSPAR) 10 MG tablet 360 tablet 3        Pharmacy where request should be sent: [facility] - [address] - [phone]  - [phone] FX     Additional details provided by patient:  Pt is asking for 1 month supply to be sent to \[facility]\"" because she is completely out, she would like additional refills for next month ongoing to [facility]    Does the patient have less than a 3 day supply:  [x] Yes  [] No    Osvaldo Wilde Rep   02/01/22 10:37 CST

## 2022-02-01 NOTE — TELEPHONE ENCOUNTER
Incoming Refill Request      Medication requested (name and dose): TRULICITY 3 MG CALLED INTO [facility]    REPATHA CALLED INTO [facility]     Pharmacy where request should be sent: [facility]     Additional details provided by patient:     Best call back number: [phone]    Does the patient have less than a 3 day supply:  [x] Yes  [] No    Osvaldo Valentine Rep  02/01/22, 10:41 CST

## 2022-02-02 DIAGNOSIS — E11.69 MIXED DIABETIC HYPERLIPIDEMIA ASSOCIATED WITH TYPE 2 DIABETES MELLITUS: Primary | ICD-10-CM

## 2022-02-02 DIAGNOSIS — E78.2 MIXED DIABETIC HYPERLIPIDEMIA ASSOCIATED WITH TYPE 2 DIABETES MELLITUS: Primary | ICD-10-CM

## 2022-02-02 RX ORDER — EVOLOCUMAB 140 MG/ML
140 INJECTION, SOLUTION SUBCUTANEOUS
Qty: 1 ML | Refills: 3 | Status: SHIPPED | OUTPATIENT
Start: 2022-02-02 | End: 2022-06-08

## 2022-02-07 ENCOUNTER — TELEPHONE (OUTPATIENT)
Dept: FAMILY MEDICINE CLINIC | Facility: CLINIC | Age: 62
End: 2022-02-07

## 2022-02-07 DIAGNOSIS — E78.1 HYPERTRIGLYCERIDEMIA: Primary | ICD-10-CM

## 2022-02-07 DIAGNOSIS — F41.8 MIXED ANXIETY AND DEPRESSIVE DISORDER: ICD-10-CM

## 2022-02-07 RX ORDER — ROSUVASTATIN CALCIUM 20 MG/1
20 TABLET, COATED ORAL NIGHTLY
Qty: 2 TABLET | Refills: 0 | Status: SHIPPED | OUTPATIENT
Start: 2022-02-07 | End: 2022-02-09 | Stop reason: SDUPTHER

## 2022-02-07 RX ORDER — BUSPIRONE HYDROCHLORIDE 10 MG/1
20 TABLET ORAL 2 TIMES DAILY PRN
Qty: 8 TABLET | Refills: 0 | Status: SHIPPED | OUTPATIENT
Start: 2022-02-07 | End: 2022-02-09 | Stop reason: SDUPTHER

## 2022-02-07 NOTE — TELEPHONE ENCOUNTER
Pt last seen 21 for chronic and was to f/u in 3 mo. Pt due for f/u for additional refills. Contacted pt back, verified name and . Advised of above. Pt requested courtesy refill and could come for appt on Wed. Offered to sched. Pt agreed. sched appt.

## 2022-02-07 NOTE — TELEPHONE ENCOUNTER
Caller: Sandra Shresthaиван DONAHUE    Relationship: Self    Best call back number: [phone]  WOULD LIKE A CALLBACK    Requested Prescriptions:   Requested Prescriptions     Pending Prescriptions Disp Refills   • busPIRone (BUSPAR) 10 MG tablet 360 tablet 3   • rosuvastatin (CRESTOR) 20 MG tablet          Pharmacy where request should be sent: [facility] - [address] - [phone]  - [phone] FX     Additional details provided by patient: PATIENT STATES SHE HAS CALLED TO REFILL THIS MEDICATION BUT IT HAS YET TO HAVE BEEN FILLED. SHE WOULD LIKE TO KNOW IF SHE HAS TO MAKE AN APPT TO BE SEEN OR IF WE CAN CALL THIS IN FOR HER. PATIENT REQUESTING A 1 MONTH SUPPLY BE CALLED INTO [facility] AND THEN A 3 MONTH SUPPLY BE CALLED INTO [facility] FOR HER MEDICATION.    Does the patient have less than a 3 day supply:  [x] Yes  [] No    Osvaldo Dawkins Rep   02/07/22 16:04 CST

## 2022-02-09 ENCOUNTER — OFFICE VISIT (OUTPATIENT)
Dept: FAMILY MEDICINE CLINIC | Facility: CLINIC | Age: 62
End: 2022-02-09

## 2022-02-09 VITALS
TEMPERATURE: 98.6 F | SYSTOLIC BLOOD PRESSURE: 130 MMHG | HEART RATE: 83 BPM | RESPIRATION RATE: 16 BRPM | BODY MASS INDEX: 48.82 KG/M2 | DIASTOLIC BLOOD PRESSURE: 80 MMHG | HEIGHT: 65 IN | WEIGHT: 293 LBS | OXYGEN SATURATION: 96 %

## 2022-02-09 DIAGNOSIS — F41.8 MIXED ANXIETY AND DEPRESSIVE DISORDER: Primary | ICD-10-CM

## 2022-02-09 DIAGNOSIS — E78.1 HYPERTRIGLYCERIDEMIA: ICD-10-CM

## 2022-02-09 DIAGNOSIS — E66.01 CLASS 3 SEVERE OBESITY WITH BODY MASS INDEX (BMI) OF 50.0 TO 59.9 IN ADULT, UNSPECIFIED OBESITY TYPE, UNSPECIFIED WHETHER SERIOUS COMORBIDITY PRESENT: ICD-10-CM

## 2022-02-09 DIAGNOSIS — K21.9 GASTROESOPHAGEAL REFLUX DISEASE WITHOUT ESOPHAGITIS: ICD-10-CM

## 2022-02-09 PROCEDURE — 99214 OFFICE O/P EST MOD 30 MIN: CPT | Performed by: NURSE PRACTITIONER

## 2022-02-09 RX ORDER — BUSPIRONE HYDROCHLORIDE 10 MG/1
20 TABLET ORAL 2 TIMES DAILY PRN
Qty: 360 TABLET | Refills: 1 | Status: SHIPPED | OUTPATIENT
Start: 2022-03-11 | End: 2022-05-10

## 2022-02-09 RX ORDER — ROSUVASTATIN CALCIUM 20 MG/1
20 TABLET, COATED ORAL NIGHTLY
Qty: 30 TABLET | Refills: 0 | Status: SHIPPED | OUTPATIENT
Start: 2022-02-09 | End: 2022-02-09 | Stop reason: SDUPTHER

## 2022-02-09 RX ORDER — BUSPIRONE HYDROCHLORIDE 10 MG/1
20 TABLET ORAL 2 TIMES DAILY PRN
Qty: 30 TABLET | Refills: 0 | Status: SHIPPED | OUTPATIENT
Start: 2022-02-09 | End: 2022-02-09 | Stop reason: SDUPTHER

## 2022-02-09 RX ORDER — BUSPIRONE HYDROCHLORIDE 10 MG/1
20 TABLET ORAL 2 TIMES DAILY PRN
Qty: 180 TABLET | Refills: 1 | Status: SHIPPED | OUTPATIENT
Start: 2022-02-09 | End: 2022-02-09

## 2022-02-09 RX ORDER — BUSPIRONE HYDROCHLORIDE 10 MG/1
20 TABLET ORAL 2 TIMES DAILY PRN
Qty: 120 TABLET | Refills: 1 | Status: SHIPPED | OUTPATIENT
Start: 2022-02-09 | End: 2022-03-02

## 2022-02-09 RX ORDER — ROSUVASTATIN CALCIUM 20 MG/1
20 TABLET, COATED ORAL NIGHTLY
Qty: 90 TABLET | Refills: 1 | Status: SHIPPED | OUTPATIENT
Start: 2022-02-09 | End: 2022-08-31 | Stop reason: SDUPTHER

## 2022-02-09 RX ORDER — CITALOPRAM 40 MG/1
40 TABLET ORAL DAILY
Qty: 90 TABLET | Refills: 1 | Status: SHIPPED | OUTPATIENT
Start: 2022-02-09 | End: 2022-08-11 | Stop reason: SDUPTHER

## 2022-02-09 RX ORDER — BUSPIRONE HYDROCHLORIDE 10 MG/1
20 TABLET ORAL 2 TIMES DAILY PRN
Qty: 360 TABLET | Refills: 1 | Status: SHIPPED | OUTPATIENT
Start: 2022-02-09 | End: 2022-02-09

## 2022-02-09 RX ORDER — ROSUVASTATIN CALCIUM 20 MG/1
20 TABLET, COATED ORAL NIGHTLY
Qty: 30 TABLET | Refills: 0 | Status: SHIPPED | OUTPATIENT
Start: 2022-02-09 | End: 2022-03-02

## 2022-02-09 NOTE — PROGRESS NOTES
"Chief Complaint  Obstructive sleep apnea syndrome (She states she is here for med refills.  She complains of  a rotten tatse that she describes as rotten egg burps. ), Anxiety, and Hyperlipidemia    Subjective    History of Present Illness {CC  Problem List  Visit Diagnosis   Encounters  Notes  Medications  Labs  Result Review Imaging  Media :23}     Patient presents to [facility] for   Obstructive sleep apnea syndrome She states she is here for med refills. She complains of a rotten tatse that she describes as rotten egg burps.      Anxiety     Hyperlipidemia (aka HYPERCHOLESTEROLEMIA)            Review of Systems   Psychiatric/Behavioral: Negative.    All other systems reviewed and are negative.      I have reviewed and agree with the {HPI ROS Review:44666} information as above.  Andreina Coats MA     Objective   Vital Signs:   /80 (BP Location: Right arm, Patient Position: Sitting)   Pulse 83   Temp 98.6 °F (37 °C)   Resp 16   Ht 164.5 cm (64.75\")   Wt (!) 150 kg (330 lb 3.2 oz)   SpO2 96%   BMI 55.37 kg/m²       Physical Exam     Result Review  Data Reviewed:{ Labs  Result Review  Imaging  Med Tab  Media :23}   {The following data was reviewed by (Optional):18043}  {Ambulatory Labs (Optional):74933}  {Data reviewed (Optional):40146:::1}            Assessment and Plan {CC Problem List  Visit Diagnosis  ROS  Review (Popup)  Health Maintenance  Quality  BestPractice  Medications  SmartSets  SnapShot Encounters  Media :23}     Problem List Items Addressed This Visit     None          {Time Spent (Optional):38471}    Follow Up {Instructions Charge Capture  Follow-up Communications :23}  No follow-ups on file.  Patient was given instructions and counseling regarding her condition or for health maintenance advice. Please see specific information pulled into the AVS if appropriate.       "

## 2022-02-09 NOTE — PROGRESS NOTES
"Chief Complaint  Anxiety (Med check she needs 30 day and 90 days uplly refilled ) and Hyperlipidemia (Med check she needs 30 day and 90 days uplly refilled )    Subjective    History of Present Illness      Patient presents to [facility] for   She complains of a rotten tatse that she describes as rotten egg burps.      Anxiety Med check she needs 30 day and 90 days uplly refilled     Hyperlipidemia Med check she needs 30 day and 90 days uplly refilled            Review of Systems   Constitutional: Negative.    HENT: Negative.    Eyes: Negative.    Respiratory: Negative.    Cardiovascular: Negative.    Gastrointestinal: Positive for GERD.   Endocrine: Negative.    Genitourinary: Negative.    Musculoskeletal: Negative.    Skin: Negative.    Allergic/Immunologic: Negative.    Neurological: Negative.    Hematological: Negative.    Psychiatric/Behavioral: Negative.        I have reviewed and agree with the HPI and ROS information as above.  Janet Mckeon, APRN     Objective   Vital Signs:   /80 (BP Location: Right arm, Patient Position: Sitting)   Pulse 83   Temp 98.6 °F (37 °C)   Resp 16   Ht 164.5 cm (64.75\")   Wt (!) 150 kg (330 lb 3.2 oz)   SpO2 96%   BMI 55.37 kg/m²       Physical Exam  Constitutional:       Appearance: Normal appearance. She is well-developed. She is obese.   HENT:      Head: Normocephalic and atraumatic.      Right Ear: External ear normal.      Left Ear: External ear normal.      Nose: Nose normal. No nasal tenderness or congestion.      Mouth/Throat:      Lips: Pink. No lesions.      Mouth: Mucous membranes are moist. No oral lesions.      Dentition: Normal dentition.      Pharynx: Oropharynx is clear. No pharyngeal swelling, oropharyngeal exudate or posterior oropharyngeal erythema.   Eyes:      General: Lids are normal. Vision grossly intact. No scleral icterus.        Right eye: No discharge.         Left eye: No discharge.      Extraocular " Movements: Extraocular movements intact.      Conjunctiva/sclera: Conjunctivae normal.      Right eye: Right conjunctiva is not injected.      Left eye: Left conjunctiva is not injected.      Pupils: Pupils are equal, round, and reactive to light.   Cardiovascular:      Rate and Rhythm: Normal rate and regular rhythm.      Heart sounds: Normal heart sounds. No murmur heard.  No gallop.    Pulmonary:      Effort: Pulmonary effort is normal.      Breath sounds: Normal breath sounds and air entry. No wheezing, rhonchi or rales.   Musculoskeletal:         General: No tenderness or deformity. Normal range of motion.      Cervical back: Full passive range of motion without pain, normal range of motion and neck supple.      Right lower leg: No edema.      Left lower leg: No edema.   Skin:     General: Skin is warm and dry.      Coloration: Skin is not jaundiced.      Findings: No rash.   Neurological:      Mental Status: She is alert and oriented to person, place, and time.      Cranial Nerves: Cranial nerves are intact.      Sensory: Sensation is intact.      Motor: Motor function is intact.      Coordination: Coordination is intact.      Gait: Gait is intact.   Psychiatric:         Attention and Perception: Attention normal.         Mood and Affect: Mood and affect normal.         Behavior: Behavior is not hyperactive. Behavior is cooperative.         Thought Content: Thought content normal.         Judgment: Judgment normal.          Result Review  Data Reviewed:   The following data was reviewed by: FLOR Vizcarra on 02/09/2022:  Lipid Panel (07/28/2021 14:28)             Assessment and Plan      Problem List Items Addressed This Visit        Cardiac and Vasculature    Hypertriglyceridemia    Relevant Medications    rosuvastatin (CRESTOR) 20 MG tablet    rosuvastatin (CRESTOR) 20 MG tablet       Endocrine and Metabolic    Class 3 severe obesity with body mass index (BMI) of 50.0 to 59.9 in adult (HCC)     "  Other Visit Diagnoses     Mixed anxiety and depressive disorder    -  Primary    Relevant Medications    citalopram (CeleXA) 40 MG tablet    busPIRone (BUSPAR) 10 MG tablet (Start on 3/11/2022)    busPIRone (BUSPAR) 10 MG tablet    Gastroesophageal reflux disease without esophagitis              Patient here today for a 3-month anxiety and depression follow-up.  She is doing well with her current dose of Celexa 40 mg with as needed BuSpar.  She feels that her anxiety depression symptoms are stable and would like to continue the same.  She is also requesting refills of Crestor.  Her last lipid panel was completed in July 2021 and was stable.  She is requesting 1 month of her medication to be sent to [facility] and a 3-month supply sent to her mail order pharmacy.    She also has complaints of burping and having a bad taste in her mouth from these burps.  She describes these as \"rotten egg burps\".  She has taken over-the-counter omeprazole at times with some improvements.    Plan:    1.  Continue same dose of Celexa 40 mg and BuSpar, refills sent to [facility] as well as her mail order.  2.  Refill sent of Crestor.  3.  Offered to start Protonix daily to help with her reflux.  Patient declines at this time and wishes to continue omeprazole.  She does state that she sees her GI doctor in a couple of weeks and will mention this to them.  4. Follow-up 3 months.    Follow Up   Return in about 3 months (around 5/9/2022) for Recheck.  Patient was given instructions and counseling regarding her condition or for health maintenance advice. Please see specific information pulled into the AVS if appropriate.       "

## 2022-02-16 ENCOUNTER — OFFICE VISIT (OUTPATIENT)
Dept: BARIATRICS/WEIGHT MGMT | Facility: CLINIC | Age: 62
End: 2022-02-16

## 2022-02-16 VITALS
SYSTOLIC BLOOD PRESSURE: 154 MMHG | TEMPERATURE: 98 F | HEART RATE: 100 BPM | BODY MASS INDEX: 48.82 KG/M2 | OXYGEN SATURATION: 94 % | DIASTOLIC BLOOD PRESSURE: 79 MMHG | WEIGHT: 293 LBS | HEIGHT: 65 IN

## 2022-02-16 DIAGNOSIS — I10 ESSENTIAL HYPERTENSION: ICD-10-CM

## 2022-02-16 DIAGNOSIS — Z79.4 TYPE 2 DIABETES MELLITUS WITH STAGE 3 CHRONIC KIDNEY DISEASE, WITH LONG-TERM CURRENT USE OF INSULIN, UNSPECIFIED WHETHER STAGE 3A OR 3B CKD: ICD-10-CM

## 2022-02-16 DIAGNOSIS — E66.01 CLASS 3 SEVERE OBESITY DUE TO EXCESS CALORIES WITH SERIOUS COMORBIDITY AND BODY MASS INDEX (BMI) OF 50.0 TO 59.9 IN ADULT: Primary | ICD-10-CM

## 2022-02-16 DIAGNOSIS — E78.1 HYPERTRIGLYCERIDEMIA: ICD-10-CM

## 2022-02-16 DIAGNOSIS — E11.22 TYPE 2 DIABETES MELLITUS WITH STAGE 3 CHRONIC KIDNEY DISEASE, WITH LONG-TERM CURRENT USE OF INSULIN, UNSPECIFIED WHETHER STAGE 3A OR 3B CKD: ICD-10-CM

## 2022-02-16 DIAGNOSIS — G47.33 OBSTRUCTIVE SLEEP APNEA SYNDROME: ICD-10-CM

## 2022-02-16 DIAGNOSIS — N18.30 TYPE 2 DIABETES MELLITUS WITH STAGE 3 CHRONIC KIDNEY DISEASE, WITH LONG-TERM CURRENT USE OF INSULIN, UNSPECIFIED WHETHER STAGE 3A OR 3B CKD: ICD-10-CM

## 2022-02-16 PROCEDURE — 99213 OFFICE O/P EST LOW 20 MIN: CPT | Performed by: NURSE PRACTITIONER

## 2022-02-17 NOTE — PROGRESS NOTES
"Patient Care Team:  Ole Soliman MD as PCP - General  Beverly England (Inactive) as Technician  Beverly England (Inactive) as Technician  Beverly England (Inactive) as Technician  Gregorio Davis MD as Consulting Physician (Nephrology)  Sola Mallory APRN as Nurse Practitioner (Hematology and Oncology)  Wilman Negrete MD as Consulting Physician (Endocrinology)  Garrett Denton MD as Consulting Physician (Otolaryngology)  Vicky Terrell APRN as Nurse Practitioner (Nurse Practitioner)    Reason for Visit:  Medical Weight loss    Subjective   Maria C Shrestha is a 61 y.o. female.     Maria C is here for follow-up and continued medical management of her morbid obesity.  She is currently on a prescription diet.  Maria C previously was to apply dietary changes such as following the meal plan as directed.  She admits to eating 2-3 meals per day.  As a result she loss weight since her last visit.  She admits to working towards better eating habits.  She states that she is drinking one 8 ounce Coke zero a day.  She denies nicotine use and denies alcohol use.  She states that she is not exercising.  She is drinking around 40 ounces of fluid each day and eating 30 to 40 g of protein each day.    Review Of Systems:  Review of Systems   Constitutional: Negative.    Respiratory: Positive for shortness of breath.         With activity   Cardiovascular: Negative.    Gastrointestinal: Negative.    Endocrine: Negative.    Musculoskeletal: Negative.    Psychiatric/Behavioral: The patient is nervous/anxious.          The following portions of the patient's history were reviewed and updated as appropriate: allergies, current medications, past family history, past medical history, past social history, past surgical history, and problem list.    Objective   /79 (BP Location: Right arm, Patient Position: Sitting, Cuff Size: Thigh Adult)   Pulse 100   Temp 98 °F (36.7 °C)   Ht 164.5 cm (64.75\")   Wt " (!) 148 kg (326 lb 12.8 oz)   LMP  (LMP Unknown)   SpO2 94%   BMI 54.80 kg/m²       02/16/22  1354   Weight: (!) 148 kg (326 lb 12.8 oz)       Physical Exam  Vitals reviewed.   Constitutional:       Appearance: She is obese.   Cardiovascular:      Rate and Rhythm: Normal rate and regular rhythm.   Pulmonary:      Effort: Pulmonary effort is normal.   Musculoskeletal:         General: Swelling present.      Comments: Lower extremities bilateral.    Skin:     General: Skin is warm and dry.   Neurological:      Mental Status: She is alert and oriented to person, place, and time.   Psychiatric:         Mood and Affect: Mood normal.         Behavior: Behavior normal.         Patient's Body mass index is 54.8 kg/m². indicating that she is morbidly obese (BMI > 40 or > 35 with obesity - related health condition). Obesity-related health conditions include the following: obstructive sleep apnea, hypertension, diabetes mellitus and dyslipidemias. Obesity is improving with treatment. BMI is is above average; BMI management plan is completed. We discussed portion control and increasing exercise..     Assessment/Plan   Diagnoses and all orders for this visit:    1. Class 3 severe obesity due to excess calories with serious comorbidity and body mass index (BMI) of 50.0 to 59.9 in adult ([facility]) (Primary)    2. Type 2 diabetes mellitus with stage 3 chronic kidney disease, with long-term current use of insulin, unspecified whether stage 3a or 3b CKD ([facility])  Comments:  Continue current regimen.  Nutritional counseling provided.    3. Obstructive sleep apnea syndrome  Comments:  Continue CPAP machine.    4. Essential hypertension  Comments:  Continue current regimen.  Patient is on dialysis.  Prescription meal plan prescribed.    5. Hypertriglyceridemia         Maria C Shrestha was seen today for follow-up, obesity, nutrition counseling and weight loss.  She has lost weight since her last visit.  Today we discussed healthy changes in  lifestyle, diet, and exercise. Dietician consultation obtained.  Maria C DONAHUE Fady had received handouts to her explaining the recommendation on portion sizes/appetite control/reading nutrition labels.   Intensive behavioral therapy for obesity was done today as well.     Goals for this month are:   1.  Patient has been encouraged to log meals and consistently four meals per day.  I encouraged patient to limit carbohydrates after her second meal.  Patient has made a lot of improvement and has lost 10 pounds since her last appointment.  Patient is in our medical weight loss program and I will continue to see her every 3 months.  I encouraged her to attend monthly support groups and to contact our office in between office visits if she has any questions or concerns.    Follow up in one month for a weight recheck.A total of 20 minutes was spent face to face with this patient and over half of the time was spent on counseling and coordination of care for the disease of obesity. We specifically reviewed the dietary prescription and I made recommendations toward increasing exercise as tolerated as well as focusing on training their behavior toward storing less.

## 2022-02-23 ENCOUNTER — LAB (OUTPATIENT)
Dept: LAB | Facility: HOSPITAL | Age: 62
End: 2022-02-23

## 2022-02-23 DIAGNOSIS — Z99.2 ANEMIA IN CHRONIC KIDNEY DISEASE, ON CHRONIC DIALYSIS: ICD-10-CM

## 2022-02-23 DIAGNOSIS — D50.0 IRON DEFICIENCY ANEMIA DUE TO CHRONIC BLOOD LOSS: ICD-10-CM

## 2022-02-23 DIAGNOSIS — D63.1 ANEMIA IN CHRONIC KIDNEY DISEASE, ON CHRONIC DIALYSIS: ICD-10-CM

## 2022-02-23 DIAGNOSIS — N18.6 ANEMIA IN CHRONIC KIDNEY DISEASE, ON CHRONIC DIALYSIS: ICD-10-CM

## 2022-02-23 LAB
25(OH)D3 SERPL-MCNC: 73.8 NG/ML (ref 30–100)
ALBUMIN SERPL-MCNC: 3.9 G/DL (ref 3.5–5)
ALBUMIN/GLOB SERPL: 1.1 G/DL (ref 1.1–2.5)
ALP SERPL-CCNC: 142 U/L (ref 24–120)
ALT SERPL W P-5'-P-CCNC: 14 U/L (ref 0–35)
ANION GAP SERPL CALCULATED.3IONS-SCNC: 9 MMOL/L (ref 4–13)
AST SERPL-CCNC: 17 U/L (ref 7–45)
AUTO MIXED CELLS #: 0.5 10*3/MM3 (ref 0.1–2.6)
AUTO MIXED CELLS %: 6.4 % (ref 0.1–24)
BILIRUB SERPL-MCNC: 0.6 MG/DL (ref 0.1–1)
BUN SERPL-MCNC: 45 MG/DL (ref 5–21)
BUN/CREAT SERPL: 12.2
CALCIUM SPEC-SCNC: 8.9 MG/DL (ref 8.4–10.4)
CHLORIDE SERPL-SCNC: 93 MMOL/L (ref 98–110)
CHOLEST SERPL-MCNC: 152 MG/DL (ref 130–200)
CO2 SERPL-SCNC: 29 MMOL/L (ref 24–31)
CREAT SERPL-MCNC: 3.69 MG/DL (ref 0.5–1.4)
ERYTHROCYTE [DISTWIDTH] IN BLOOD BY AUTOMATED COUNT: 15.1 % (ref 12.3–15.4)
GFR SERPL CREATININE-BSD FRML MDRD: 12 ML/MIN/1.73
GLOBULIN UR ELPH-MCNC: 3.6 GM/DL
GLUCOSE SERPL-MCNC: 439 MG/DL (ref 70–100)
HBA1C MFR BLD: 11.5 % (ref 4.8–5.9)
HCT VFR BLD AUTO: 34.1 % (ref 34–46.6)
HDLC SERPL-MCNC: 44 MG/DL
HGB BLD-MCNC: 11.5 G/DL (ref 12–15.9)
LDLC SERPL CALC-MCNC: 49 MG/DL (ref 0–99)
LDLC/HDLC SERPL: 0.65 {RATIO}
LYMPHOCYTES # BLD AUTO: 1.5 10*3/MM3 (ref 0.7–3.1)
LYMPHOCYTES NFR BLD AUTO: 17.1 % (ref 19.6–45.3)
MCH RBC QN AUTO: 32.3 PG (ref 26.6–33)
MCHC RBC AUTO-ENTMCNC: 33.7 G/DL (ref 31.5–35.7)
MCV RBC AUTO: 95.8 FL (ref 79–97)
NEUTROPHILS NFR BLD AUTO: 6.5 10*3/MM3 (ref 1.7–7)
NEUTROPHILS NFR BLD AUTO: 76.5 % (ref 42.7–76)
PLATELET # BLD AUTO: 239 10*3/MM3 (ref 140–450)
PMV BLD AUTO: 8.8 FL (ref 6–12)
POTASSIUM SERPL-SCNC: 3.9 MMOL/L (ref 3.5–5.3)
PROT SERPL-MCNC: 7.5 G/DL (ref 6.3–8.7)
RBC # BLD AUTO: 3.56 10*6/MM3 (ref 3.77–5.28)
SODIUM SERPL-SCNC: 131 MMOL/L (ref 135–145)
TRIGL SERPL-MCNC: 396 MG/DL (ref 0–149)
TSH SERPL DL<=0.05 MIU/L-ACNC: 2.81 UIU/ML (ref 0.27–4.2)
VIT B12 BLD-MCNC: 816 PG/ML (ref 211–946)
VLDLC SERPL-MCNC: 59 MG/DL (ref 5–40)
WBC NRBC COR # BLD: 8.5 10*3/MM3 (ref 3.4–10.8)

## 2022-02-23 PROCEDURE — 80053 COMPREHEN METABOLIC PANEL: CPT | Performed by: INTERNAL MEDICINE

## 2022-02-23 PROCEDURE — 83540 ASSAY OF IRON: CPT | Performed by: INTERNAL MEDICINE

## 2022-02-23 PROCEDURE — 85025 COMPLETE CBC W/AUTO DIFF WBC: CPT | Performed by: INTERNAL MEDICINE

## 2022-02-23 PROCEDURE — 82746 ASSAY OF FOLIC ACID SERUM: CPT | Performed by: INTERNAL MEDICINE

## 2022-02-23 PROCEDURE — 36415 COLL VENOUS BLD VENIPUNCTURE: CPT | Performed by: INTERNAL MEDICINE

## 2022-02-23 PROCEDURE — 82306 VITAMIN D 25 HYDROXY: CPT | Performed by: INTERNAL MEDICINE

## 2022-02-23 PROCEDURE — 80061 LIPID PANEL: CPT | Performed by: INTERNAL MEDICINE

## 2022-02-23 PROCEDURE — 82607 VITAMIN B-12: CPT | Performed by: INTERNAL MEDICINE

## 2022-02-23 PROCEDURE — 84443 ASSAY THYROID STIM HORMONE: CPT | Performed by: INTERNAL MEDICINE

## 2022-02-23 PROCEDURE — 82728 ASSAY OF FERRITIN: CPT | Performed by: INTERNAL MEDICINE

## 2022-02-23 PROCEDURE — 84466 ASSAY OF TRANSFERRIN: CPT | Performed by: INTERNAL MEDICINE

## 2022-02-23 PROCEDURE — 83036 HEMOGLOBIN GLYCOSYLATED A1C: CPT | Performed by: INTERNAL MEDICINE

## 2022-02-24 LAB
FERRITIN SERPL-MCNC: 1696 NG/ML (ref 13–150)
FOLATE SERPL-MCNC: 11.6 NG/ML (ref 4.78–24.2)
IRON 24H UR-MRATE: 171 MCG/DL (ref 37–145)
IRON SATN MFR SERPL: 60 % (ref 20–50)
TIBC SERPL-MCNC: 285 MCG/DL (ref 298–536)
TRANSFERRIN SERPL-MCNC: 191 MG/DL (ref 200–360)

## 2022-02-24 NOTE — PROGRESS NOTES
Clinic Progress Note    Patient:  Maria C Shrestha  YOB: 1960  Date of Service: 2/25/2022  MRN: [id number]   Acct:    Primary Care Physician: Ole Soliman MD    Chief Complaint:  Anemia of chronic kidney disease    Hematology History:  Ms. Shrestha is a 61 y.o. pleasant lady with past medical history of T2 DM, CKD stage 4, hyperlipidemia, hypertension, of JOSE J on CPAP, hypothyroidism, who presents for follow-up of anemia of chronic kidney disease.    She has been receiving Procrit along with her dialysis.  She also does receive iron infusion when she has iron deficiency.    Other pertinent information:  For her CKD stage 4, she follows with nephrology Dr. Davis. Reportedly, she does have advanced kidney disease, and she started dialysis on 8/1/2021.    Interval History:  Ms. Shrestha is here for his scheduled follow-up.  In the interim from last visit, she has been doing fairly well.  She has been receiving dialysis, and she reports that she feels fairly well overall.  She denies having any fever, chills, chest pain, shortness of breath, abdominal pain, nausea or vomiting, change in bowel or urine habits.  She reports that she was told that she may have CHF, for which I suggested that if she can get an echo ordered to investigate that that would evaluate for this.    Objectives:  Vitals:    02/25/22 0919   BP: 128/68   Pulse: 80   Resp: 16   Temp: 97.2 °F (36.2 °C)   SpO2: 98%     ECOG Performance Status: 2  GENERAL: Alert and oriented, no apparent distress  HEENT: No scleral icterus  NECK: Supple  HEART: Regular rate and rhythm  LUNGS: Clear to auscultation bilaterally  ABDOMEN: Soft, nontender, nondistended  EXTREMITIES: Symmetric  SKIN: No jaundice  PSYCHIATRIC: Full affect  NEUROLOGIC: Stable gait    Results:  Lab Results   Component Value Date    WBC 8.50 02/23/2022    HGB 11.5 (L) 02/23/2022    HCT 34.1 02/23/2022    MCV 95.8 02/23/2022     02/23/2022     Lab Results   Component Value Date     IRON 171 (H) 02/23/2022    TIBC 285 (L) 02/23/2022    FERRITIN 1,696.00 (H) 02/23/2022     Assessment :  Ms. Shrestha is a 61 y.o. pleasant lady with past medical history of T2 DM, CKD stage 4, hyperlipidemia, hypertension, of JOSE J on CPAP, hypothyroidism, who presents for  Follow-up of anemia of chronic kidney disease.    Plan:   - The patient has history of anemia of chronic kidney disease.  She has been receiving Procrit per guidelines, as well as iron when she develops iron deficiency.  - The patient follows with Nephrology, as started dialysis in 08/2021; at this time, she is receiving Procrit  Along with dialysis well per guidelines, and she reports that nephrology has been managing iron and hemoglobin.  -  She does follow with her PCP, Endocrinology, for her other health problems, including T2 DM, hypertension, hyperlipidemia, hypothyroidism, JOSE J.  - At this point, I would recommend that she continues to follow with Nephrology other specialists, as nephrology has been managing her anemia, iron, and Epogen injections. She can reach back to our clinic on as needed basis.    Maria L Andrews MD  2/25/2022

## 2022-02-25 ENCOUNTER — OFFICE VISIT (OUTPATIENT)
Dept: ONCOLOGY | Facility: CLINIC | Age: 62
End: 2022-02-25

## 2022-02-25 ENCOUNTER — APPOINTMENT (OUTPATIENT)
Dept: LAB | Facility: HOSPITAL | Age: 62
End: 2022-02-25

## 2022-02-25 VITALS
WEIGHT: 293 LBS | TEMPERATURE: 97.2 F | SYSTOLIC BLOOD PRESSURE: 128 MMHG | RESPIRATION RATE: 16 BRPM | BODY MASS INDEX: 48.82 KG/M2 | HEIGHT: 65 IN | HEART RATE: 80 BPM | OXYGEN SATURATION: 98 % | DIASTOLIC BLOOD PRESSURE: 68 MMHG

## 2022-02-25 DIAGNOSIS — D63.1 ANEMIA IN CHRONIC KIDNEY DISEASE, ON CHRONIC DIALYSIS: Primary | ICD-10-CM

## 2022-02-25 DIAGNOSIS — Z99.2 ANEMIA IN CHRONIC KIDNEY DISEASE, ON CHRONIC DIALYSIS: Primary | ICD-10-CM

## 2022-02-25 DIAGNOSIS — N18.6 ANEMIA IN CHRONIC KIDNEY DISEASE, ON CHRONIC DIALYSIS: Primary | ICD-10-CM

## 2022-02-25 DIAGNOSIS — D50.0 IRON DEFICIENCY ANEMIA DUE TO CHRONIC BLOOD LOSS: ICD-10-CM

## 2022-02-25 PROCEDURE — 99213 OFFICE O/P EST LOW 20 MIN: CPT | Performed by: INTERNAL MEDICINE

## 2022-03-02 ENCOUNTER — OFFICE VISIT (OUTPATIENT)
Dept: ENDOCRINOLOGY | Facility: CLINIC | Age: 62
End: 2022-03-02

## 2022-03-02 VITALS
OXYGEN SATURATION: 97 % | DIASTOLIC BLOOD PRESSURE: 50 MMHG | WEIGHT: 293 LBS | HEART RATE: 85 BPM | HEIGHT: 65 IN | BODY MASS INDEX: 48.82 KG/M2 | SYSTOLIC BLOOD PRESSURE: 100 MMHG

## 2022-03-02 DIAGNOSIS — N18.6 TYPE 2 DIABETES MELLITUS WITH CHRONIC KIDNEY DISEASE ON CHRONIC DIALYSIS, WITH LONG-TERM CURRENT USE OF INSULIN: Primary | ICD-10-CM

## 2022-03-02 DIAGNOSIS — E55.9 VITAMIN D DEFICIENCY: ICD-10-CM

## 2022-03-02 DIAGNOSIS — E11.22 TYPE 2 DIABETES MELLITUS WITH CHRONIC KIDNEY DISEASE ON CHRONIC DIALYSIS, WITH LONG-TERM CURRENT USE OF INSULIN: Primary | ICD-10-CM

## 2022-03-02 DIAGNOSIS — I15.2 HYPERTENSION ASSOCIATED WITH DIABETES: ICD-10-CM

## 2022-03-02 DIAGNOSIS — E53.8 B12 DEFICIENCY: ICD-10-CM

## 2022-03-02 DIAGNOSIS — E11.59 HYPERTENSION ASSOCIATED WITH DIABETES: ICD-10-CM

## 2022-03-02 DIAGNOSIS — E11.69 MIXED DIABETIC HYPERLIPIDEMIA ASSOCIATED WITH TYPE 2 DIABETES MELLITUS: ICD-10-CM

## 2022-03-02 DIAGNOSIS — E78.2 MIXED DIABETIC HYPERLIPIDEMIA ASSOCIATED WITH TYPE 2 DIABETES MELLITUS: ICD-10-CM

## 2022-03-02 DIAGNOSIS — Z99.2 TYPE 2 DIABETES MELLITUS WITH CHRONIC KIDNEY DISEASE ON CHRONIC DIALYSIS, WITH LONG-TERM CURRENT USE OF INSULIN: Primary | ICD-10-CM

## 2022-03-02 DIAGNOSIS — E11.649 HYPOGLYCEMIA UNAWARENESS ASSOCIATED WITH TYPE 2 DIABETES MELLITUS: ICD-10-CM

## 2022-03-02 DIAGNOSIS — Z79.4 TYPE 2 DIABETES MELLITUS WITH CHRONIC KIDNEY DISEASE ON CHRONIC DIALYSIS, WITH LONG-TERM CURRENT USE OF INSULIN: Primary | ICD-10-CM

## 2022-03-02 DIAGNOSIS — E03.9 ACQUIRED HYPOTHYROIDISM: ICD-10-CM

## 2022-03-02 PROCEDURE — 99214 OFFICE O/P EST MOD 30 MIN: CPT | Performed by: INTERNAL MEDICINE

## 2022-03-02 PROCEDURE — 95251 CONT GLUC MNTR ANALYSIS I&R: CPT | Performed by: INTERNAL MEDICINE

## 2022-03-02 RX ORDER — INSULIN HUMAN 500 [IU]/ML
INJECTION, SOLUTION SUBCUTANEOUS
Qty: 6 PEN | Refills: 11 | Status: SHIPPED | OUTPATIENT
Start: 2022-03-02 | End: 2022-03-02 | Stop reason: SDUPTHER

## 2022-03-02 RX ORDER — CALCITRIOL 0.5 UG/1
0.5 CAPSULE, LIQUID FILLED ORAL DAILY
COMMUNITY
End: 2022-05-24

## 2022-03-02 RX ORDER — INSULIN HUMAN 500 [IU]/ML
INJECTION, SOLUTION SUBCUTANEOUS
Qty: 18 PEN | Refills: 3 | Status: SHIPPED | OUTPATIENT
Start: 2022-03-02

## 2022-03-02 RX ORDER — CALCITRIOL 0.5 UG/1
0.5 CAPSULE, LIQUID FILLED ORAL DAILY
COMMUNITY

## 2022-03-02 NOTE — PROGRESS NOTES
" Maria C Shrestha is a 61 y.o. female who presents for  evaluation of   Type 2 diabetes            Primary Care / Referring Provider  Ole Soliman MD    History of Present Illness  Duration/Timing:  Diabetes mellitus type 2          Severity (Complications/Hospitalizations)  Secondary Microvascular Complications:  Diabetic Nephropathy-ESRD, No Diabetic Neuropathy    Macrovascular  , Carotid Artery Disease      Context  Diabetes Regimen:  Insulin, Compliant with regimen  Blood Glucose Readings  Running high since dialysis        Diet    counts carbs, eating only 45 g cho per meal     Exercise:  Does not exercise    Associated Signs/Symptoms  Hyperglycemic Symptoms:  No polyuria, No polydipsia, No polyphagia, Weight loss 70 lbs since HD   Hypoglycemic Episodes:  No documented hypoglycemia      Since last appt started HD     .  PE    /50   Pulse 85   Ht 165.1 cm (65\")   Wt (!) 147 kg (324 lb)   LMP  (LMP Unknown)   SpO2 97%   BMI 53.92 kg/m²   AOx3  No visible goiter  Normal Respiratory Effort , Lung CTA  RRR  No Edema    Labs    Lab Results   Component Value Date    WBC 8.50 02/23/2022    HGB 11.5 (L) 02/23/2022    HCT 34.1 02/23/2022    MCV 95.8 02/23/2022     02/23/2022     Lab Results   Component Value Date    GLUCOSE 439 (H) 02/23/2022    BUN 45 (H) 02/23/2022    CREATININE 3.69 (H) 02/23/2022    EGFRIFNONA 12 (L) 02/23/2022    EGFRIFAFRI  09/01/2021      Comment:      <15 Indicative of kidney failure.    BCR 12.2 02/23/2022     (L) 02/23/2022    K 3.9 02/23/2022    CO2 29.0 02/23/2022    CALCIUM 8.9 02/23/2022    ALBUMIN 3.90 02/23/2022    AST 17 02/23/2022    ALT 14 02/23/2022         Assessment/Plan       ICD-10-CM ICD-9-CM   1. Type 2 diabetes mellitus with chronic kidney disease on chronic dialysis, with long-term current use of insulin ([facility])  E11.22 250.40    N18.6 585.9    Z99.2 V45.11    Z79.4 V58.67   2. Hypertension associated with diabetes ([facility])  E11.59 250.80    I15.2 401.9   3. " Mixed diabetic hyperlipidemia associated with type 2 diabetes mellitus (HCC)  E11.69 250.80    E78.2 272.2   4. Vitamin D deficiency  E55.9 268.9   5. B12 deficiency  E53.8 266.2   6. Acquired hypothyroidism  E03.9 244.9   7. Hypoglycemia unawareness associated with type 2 diabetes mellitus (HCC)  E11.649 250.80     251.2       Glycemic Management:   Lab Results   Component Value Date    HGBA1C 11.5 (H) 02/23/2022    HGBA1C 6.6 (H) 08/10/2021    HGBA1C 7.0 (H) 07/21/2021     Lab Results   Component Value Date    GLUCOSE 439 (H) 02/23/2022    BUN 45 (H) 02/23/2022    CREATININE 3.69 (H) 02/23/2022    EGFRIFNONA 12 (L) 02/23/2022    EGFRIFAFRI  09/01/2021      Comment:      <15 Indicative of kidney failure.    BCR 12.2 02/23/2022    CO2 29.0 02/23/2022    CALCIUM 8.9 02/23/2022    ALBUMIN 3.90 02/23/2022    AST 17 02/23/2022    ALT 14 02/23/2022     Lab Results   Component Value Date    WBC 8.50 02/23/2022    HGB 11.5 (L) 02/23/2022    HCT 34.1 02/23/2022    MCV 95.8 02/23/2022     02/23/2022     Lab Results   Component Value Date    CREATININE 3.69 (H) 02/23/2022    CREATININE 4.86 (H) 09/01/2021    CREATININE 3.09 (H) 07/28/2021    CREATININE 3.45 (H) 07/21/2021    CREATININE 3.30 (H) 07/14/2021        Using deidra   TIR 1% , no lows    Conclusion , Type 2 DM w hyperglycemia       Trulicity   3 mg weekly     Failing Lantus 80 and Humalog 40 w meals     Humulin U 500     Restart at 100 and 60 but may titrate to 150 bid             Change deidra 14 to deidra 2    Criteria for Approval of CGM and/or Insulin Pump     The patient has diabetes mellitus, insulin-dependent.    Our Diabetes Department has evaluated the patient in the last six months and will continue counseling on insulin adjustment.     The patient performs blood glucose testing at least four times daily with proven glucose variability from 50 to 300 mg per dl.    The patient is administering basal insulin and prandial insulin four times per day for  more than six months.    The patient uses a home blood glucose monitor to assess blood glucose at least four times daily for more than six months.    The patient requires frequent adjustment of insulin treatment regimen based on blood glucose readings.    The patient has frequent variability in blood glucose readings due to activity and variability in meal content and time.     The patient has completed a diabetes education program with us.    The patient has demonstrated the ability to self-monitor her glucose.     The patient is motivated in improving diabetes control     She needs alarms since hypoglycemia unawaraness       Lipid Management    Lab Results   Component Value Date    TRIG 396 (H) 02/23/2022    TRIG 116 07/21/2021    TRIG 355 (H) 03/17/2021     Lab Results   Component Value Date    HDL 44 (L) 02/23/2022    HDL 53 07/21/2021    HDL 41 (L) 03/17/2021     No components found for: LDLCALC  Lab Results   Component Value Date    LDL 49 02/23/2022    LDL 27 07/21/2021    LDL 81 03/17/2021     Lab Results   Component Value Date    LDL 49 02/23/2022    LDL 27 07/21/2021    LDL 81 03/17/2021         crestor and repatha, we will keep unless nephrology suggests otherwise     Blood Pressure Management:    Vitals:    03/02/22 1359   BP: 100/50   Pulse: 85   SpO2: 97%         Per nephrology -        Microvascular Complication Monitoring:  No Microalbuminuria, No Diabetic Retinopathy, Date of last eye exam 07/18/2019, No Diabetic Neuropathy  on ACE i and coreg 12.5 po bid     ckd stage V    followed by nephrology     I suggest that she doesn't consume more than 140 g protein per day   Plant better than animal but restricted on keto     --      Lab Results   Component Value Date    CREATININE 3.69 (H) 02/23/2022    BUN 45 (H) 02/23/2022     (L) 02/23/2022    K 3.9 02/23/2022    CL 93 (L) 02/23/2022    CO2 29.0 02/23/2022         Lab Results   Component Value Date    CREATININE 3.69 (H) 02/23/2022    CREATININE  4.86 (H) 09/01/2021    CREATININE 3.09 (H) 07/28/2021         Immunizations:  Last pneumococcal immunization pneumovax before age 65     Flu Shot will have in Mid Nov 2016       Preventive Care:  No smoking      Weight Related:   Wt Readings from Last 3 Encounters:   03/02/22 (!) 147 kg (324 lb)   02/25/22 (!) 147 kg (323 lb 9.6 oz)   02/16/22 (!) 148 kg (326 lb 12.8 oz)     Body mass index is 53.92 kg/m².      prefers no bariatric surgery    Now on cpap       Bone Health    Lab Results   Component Value Date    .3 (H) 08/12/2021    CALCIUM 8.9 02/23/2022     For JARETH       Per nephrology       Thyroid Health  Lab Results   Component Value Date    TSH 2.810 02/23/2022     On levothyroxine 50 ug daily - increase to 75 mcgs daily - increase to 88     Other Diabetes Related Aspects       Lab Results   Component Value Date    BHTZTCNQ42 816 02/23/2022     Lab Results   Component Value Date    WBC 8.50 02/23/2022    HGB 11.5 (L) 02/23/2022    HCT 34.1 02/23/2022    MCV 95.8 02/23/2022     02/23/2022     Lab Results   Component Value Date    IRON 171 (H) 02/23/2022    TIBC 285 (L) 02/23/2022    FERRITIN 1,696.00 (H) 02/23/2022       Anemia , managed by nephrology     On procrit    Had Hysterectomy     Systolic Murmur, AS? Echo   Could be due to anemia     Echo and carotid US 2020 , normal echo and less than 50% blockage in carotids, repeat

## 2022-03-08 ENCOUNTER — DOCUMENTATION (OUTPATIENT)
Dept: ENDOCRINOLOGY | Facility: CLINIC | Age: 62
End: 2022-03-08

## 2022-03-22 ENCOUNTER — TELEPHONE (OUTPATIENT)
Dept: ENDOCRINOLOGY | Facility: CLINIC | Age: 62
End: 2022-03-22

## 2022-03-22 DIAGNOSIS — E03.9 ACQUIRED HYPOTHYROIDISM: ICD-10-CM

## 2022-03-22 DIAGNOSIS — F41.8 MIXED ANXIETY AND DEPRESSIVE DISORDER: ICD-10-CM

## 2022-03-22 RX ORDER — LEVOTHYROXINE SODIUM 88 UG/1
88 TABLET ORAL DAILY
Qty: 90 TABLET | Refills: 3 | Status: SHIPPED | OUTPATIENT
Start: 2022-03-22 | End: 2022-11-05

## 2022-03-22 RX ORDER — BUSPIRONE HYDROCHLORIDE 10 MG/1
20 TABLET ORAL 2 TIMES DAILY PRN
Qty: 360 TABLET | Refills: 1 | Status: CANCELLED | OUTPATIENT
Start: 2022-03-22

## 2022-03-22 NOTE — TELEPHONE ENCOUNTER
Caller: Maria C Shrestha    Relationship: Self    Best call back number: [phone]    Requested Prescriptions:   Requested Prescriptions     Pending Prescriptions Disp Refills   • lamoTRIgine (LaMICtal) 100 MG tablet 45 tablet 3     Sig: Take 0.5 tablets by mouth Daily.   • busPIRone (BUSPAR) 10 MG tablet 360 tablet 1     Sig: Take 2 tablets by mouth 2 (Two) Times a Day As Needed (anxiety).        Pharmacy where request should be sent: [facility] - [address] [phone] [facility] [phone]      Additional details provided by patient: PATIENT REQUESTING 90 DAY SUPPLY WITH 3 REFILLS FOR BOTH MEDICATIONS.    Does the patient have less than a 3 day supply:  [x] Yes  [] No    Osvaldo Dawkins Rep   03/22/22 10:11 CDT

## 2022-03-22 NOTE — TELEPHONE ENCOUNTER
Has she been on this? It looks like Marlyn ONEILL sent this in last year, but I cant seen where she had refills after that.

## 2022-03-22 NOTE — TELEPHONE ENCOUNTER
Pt needs new script for Levothyroxine 88 MCG for 90 day supply with 3 refills called into [facility]. PT IS OUT AND HAS BEEN OUT FOR A WEEK.       Also pt wants to let Dr Pradhan know the new insulin is slowing working, and pt is doing better      Thanks

## 2022-03-24 RX ORDER — LAMOTRIGINE 100 MG/1
50 TABLET ORAL DAILY
Qty: 90 TABLET | Refills: 3 | Status: SHIPPED | OUTPATIENT
Start: 2022-03-24 | End: 2022-08-11 | Stop reason: SDUPTHER

## 2022-04-20 ENCOUNTER — OFFICE VISIT (OUTPATIENT)
Dept: GASTROENTEROLOGY | Facility: CLINIC | Age: 62
End: 2022-04-20

## 2022-04-20 VITALS
WEIGHT: 293 LBS | DIASTOLIC BLOOD PRESSURE: 60 MMHG | SYSTOLIC BLOOD PRESSURE: 140 MMHG | HEART RATE: 73 BPM | BODY MASS INDEX: 48.82 KG/M2 | TEMPERATURE: 96.4 F | HEIGHT: 65 IN | OXYGEN SATURATION: 98 %

## 2022-04-20 DIAGNOSIS — K92.1 BLACK STOOL: ICD-10-CM

## 2022-04-20 DIAGNOSIS — Z86.010 HISTORY OF ADENOMATOUS POLYP OF COLON: Primary | ICD-10-CM

## 2022-04-20 DIAGNOSIS — K62.5 BRBPR (BRIGHT RED BLOOD PER RECTUM): ICD-10-CM

## 2022-04-20 DIAGNOSIS — Z01.818 PREOPERATIVE TESTING: Primary | ICD-10-CM

## 2022-04-20 PROBLEM — Z86.0101 HISTORY OF ADENOMATOUS POLYP OF COLON: Status: ACTIVE | Noted: 2022-04-20

## 2022-04-20 PROCEDURE — 99214 OFFICE O/P EST MOD 30 MIN: CPT | Performed by: NURSE PRACTITIONER

## 2022-04-20 RX ORDER — POLYETHYLENE GLYCOL 3350, SODIUM SULFATE ANHYDROUS, SODIUM BICARBONATE, SODIUM CHLORIDE, POTASSIUM CHLORIDE 236; 22.74; 6.74; 5.86; 2.97 G/4L; G/4L; G/4L; G/4L; G/4L
4 POWDER, FOR SOLUTION ORAL ONCE
Qty: 4000 ML | Refills: 0 | Status: SHIPPED | OUTPATIENT
Start: 2022-04-20 | End: 2022-04-20

## 2022-04-20 NOTE — PROGRESS NOTES
"      General acute hospital Gastroenterology    Primary Physician Ole Soliman MD    4/20/2022    Maria C Shrestha   1960      Chief Complaint   Patient presents with   • Colonoscopy       Subjective     HPI    Maria C Shrestha is a 62 y.o. female who presents as a referral for preventative maintenance. She has no complaints of nausea or vomiting. No change in bowels. No wt loss. No melena. No abdominal pain.       Bright red blood per rectum   Intermittent for years. Small amount.       Black stool   Since 8/2022. Since starting auryxia. Stools formed.  Takes asa daily. No nsaids or arthritis meds.  No history of gastric ulcers. No pepto or iron pills. No abdominal pain.         COLONOSCOPY (03/22/2017 08:14) recall 5 years   Tissue Exam (03/22/2017 08:39)tubular adenomatous    No family history   Had egd several years ago. \" had gastric polyps\".       She has chronic kidney failure. Gets dialysis T, Th, and Saturday.     Past Medical History:   Diagnosis Date   • Acquired hypothyroidism 2/6/2017   • Allergic    • Anemia    • Anemia due to stage 4 chronic kidney disease (HCC) 8/18/2020   • Anxiety    • Arthritis    • Cellulitis    • Chronic kidney disease     3rd stage   • Depression    • Disease of thyroid gland    • Dyslipidemia    • Elevated cholesterol    • Essential hypertension    • Fatigue    • Gallbladder abscess    • Hearing loss    • Light headed    • Limited range of motion (ROM) of shoulder     right   • Obesity    • Palpitation    • Short of breath on exertion    • Sinusitis    • Sleep apnea    • Stage 4 chronic kidney disease (HCC) 9/16/2020   • Type 2 diabetes mellitus (HCC)    • Type II diabetes mellitus, uncontrolled    • Wears glasses        Past Surgical History:   Procedure Laterality Date   • ADENOIDECTOMY     • ANAL FISTULA REPAIR      x 2    • CHOLECYSTECTOMY     • COLONOSCOPY  01/02/2014   • COLONOSCOPY N/A 3/22/2017    Procedure: COLONOSCOPY WITH ANESTHESIA;  Surgeon: Mono Linder MD;  " "Location: [facility] ENDOSCOPY;  Service:    • D & C HYSTEROSCOPY N/A 7/25/2018    Procedure: DILATATION AND CURETTAGE HYSTEROSCOPY;  Surgeon: Michael Castaneda MD;  Location: [facility] OR;  Service: Obstetrics/Gynecology   • DILATATION AND CURETTAGE      X 2   • ENDOSCOPY     • HYSTERECTOMY     • TONSILLECTOMY         Outpatient Medications Marked as Taking for the 4/20/22 encounter (Office Visit) with Lisette Denton APRN   Medication Sig Dispense Refill   • allopurinol (ZYLOPRIM) 100 MG tablet Take 100 mg by mouth 2 (Two) Times a Day.     • aspirin 81 MG tablet Take 81 mg by mouth Daily. Stop 7/22/2018     • B Complex-C-Folic Acid (FELIPA CAPS PO) Take  by mouth Daily.     • BD Insulin Syringe U/F 31G X 5/16\" 1 ML misc AS DIRECTED FOUR TIMES A  each 1   • busPIRone (BUSPAR) 10 MG tablet Take 2 tablets by mouth 2 (Two) Times a Day As Needed (anxiety). 360 tablet 1   • calcitriol (ROCALTROL) 0.5 MCG capsule Take 0.5 mcg by mouth Daily. 3 x a week     • calcitriol (ROCALTROL) 0.5 MCG capsule Take 0.5 mcg by mouth Daily. 3 x a week     • cetirizine (zyrTEC) 10 MG tablet Take 10 mg by mouth As Needed.     • citalopram (CeleXA) 40 MG tablet Take 1 tablet by mouth Daily. 90 tablet 1   • diazePAM (VALIUM) 2 MG tablet Take 2 mg by mouth As Needed for Anxiety.     • Docusate Calcium (STOOL SOFTENER PO) Take  by mouth 2 (Two) Times a Day.     • Dulaglutide (Trulicity) 3 MG/0.5ML solution pen-injector Inject 3 mg under the skin into the appropriate area as directed 1 (One) Time Per Week. 12 pen 3   • epoetin elsa (EPOGEN,PROCRIT) 71748 UNIT/ML injection Inject 10,000 Units under the skin into the appropriate area as directed As Needed.     • Ergocalciferol (VITAMIN D2 PO) Take  by mouth 3 (Three) Times a Week.     • Evolocumab (Repatha) solution prefilled syringe injection Inject 1 mL under the skin into the appropriate area as directed Every 14 (Fourteen) Days. 1 mL 3   • FERRIC CITRATE PO Take  by mouth.     • fluticasone " (FLONASE) 50 MCG/ACT nasal spray 1 spray into the nostril(s) as directed by provider 2 (Two) Times a Day.     • Glucose Blood (BLOOD GLUCOSE TEST) strip Use 4 x daily, use any brand covered by insurance or same brand as before 360 each 3   • Insulin Pen Needle (B-D ULTRAFINE III SHORT PEN) 31G X 8 MM misc USE AS DIRECTED TO INJECT FOUR TIMES DAILY 200 each 2   • Insulin Regular Human, Conc, (HumuLIN R U-500 KwikPen) 500 UNIT/ML solution pen-injector CONCENTRATED injection 150 units twice daily 18 pen 3   • lamoTRIgine (LaMICtal) 100 MG tablet Take 0.5 tablets by mouth Daily. 90 tablet 3   • Lancets (freestyle) lancets Use as instructed 4 x daily 150 each 11   • levothyroxine (SYNTHROID, LEVOTHROID) 88 MCG tablet Take 1 tablet by mouth Daily. 90 tablet 3   • Methoxy PEG-Epoetin Beta (MIRCERA IJ) 50 mcg Every 28 (Twenty-Eight) Days.     • mupirocin (BACTROBAN) 2 % ointment Apply  topically to the appropriate area as directed 3 (Three) Times a Day. (Patient taking differently: Apply 11 application topically to the appropriate area as directed As Needed.) 22 g 0   • ONDANSETRON PO Take  by mouth As Needed.     • rosuvastatin (CRESTOR) 20 MG tablet Take 1 tablet by mouth Every Night. 90 tablet 1       Allergies   Allergen Reactions   • Codeine Nausea Only and Nausea And Vomiting     N/v/felt hot       Social History     Socioeconomic History   • Marital status:    Tobacco Use   • Smoking status: Never Smoker   • Smokeless tobacco: Never Used   Vaping Use   • Vaping Use: Never used   Substance and Sexual Activity   • Alcohol use: No   • Drug use: No   • Sexual activity: Defer     Birth control/protection: Post-menopausal       Family History   Problem Relation Age of Onset   • Diabetes Other    • Heart failure Other    • Cancer Other    • Kidney disease Other    • Lung cancer Mother    • Heart disease Father    • Diabetes Father    • Obesity Father    • Stroke Father    • Prostate cancer Father    • Cancer Maternal  Grandmother    • Uterine cancer Maternal Grandmother    • Diabetes Maternal Grandfather    • Cancer Paternal Grandmother    • Colon cancer Paternal Grandmother    • Diabetes Paternal Grandfather    • Heart disease Paternal Grandfather    • No Known Problems Sister    • No Known Problems Brother    • No Known Problems Daughter    • No Known Problems Maternal Aunt    • No Known Problems Paternal Aunt    • BRCA 1/2 Neg Hx    • Breast cancer Neg Hx    • Endometrial cancer Neg Hx    • Ovarian cancer Neg Hx        Review of Systems   Constitutional: Negative for chills, fever and unexpected weight change.   Respiratory: Positive for shortness of breath (chronic). Negative for wheezing.    Cardiovascular: Negative for chest pain and palpitations.   Gastrointestinal: Positive for blood in stool (chronic ). Negative for abdominal distention, abdominal pain, constipation, diarrhea, nausea and vomiting.       Objective     Vitals:    04/20/22 0837   BP: 140/60   Pulse: 73   Temp: 96.4 °F (35.8 °C)   SpO2: 98%         04/20/22  0837   Weight: (!) 148 kg (326 lb)     Body mass index is 54.25 kg/m².    Physical Exam  Vitals reviewed.   Constitutional:       General: She is not in acute distress.  Cardiovascular:      Rate and Rhythm: Normal rate and regular rhythm.      Heart sounds: Normal heart sounds.   Pulmonary:      Effort: Pulmonary effort is normal.      Breath sounds: Normal breath sounds.   Abdominal:      General: Bowel sounds are normal. There is no distension.      Palpations: Abdomen is soft.      Tenderness: There is no abdominal tenderness.   Skin:     General: Skin is warm and dry.   Neurological:      Mental Status: She is alert.         Imaging Results (Most Recent)     None          Assessment/Plan     Diagnoses and all orders for this visit:    1. History of adenomatous polyp of colon (Primary)  -     Cancel: Case Request; Standing  -     Cancel: Case Request  -     Case Request; Standing  -     Case  Request    2. BRBPR (bright red blood per rectum)  Comments:  chronic  Orders:  -     Case Request; Standing  -     Case Request    3. Black stool  -     Case Request; Standing  -     Case Request    Other orders  -     Cancel: Follow Anesthesia Guidelines / Protocol; Future  -     Cancel: Obtain Informed Consent; Future  -     polyethylene glycol (Golytely) 236 g solution; Take 4,000 mL by mouth 1 (One) Time for 1 dose. Take as directed per instruction sheet.  Dispense: 4000 mL; Refill: 0  -     Follow Anesthesia Guidelines / Protocol; Future  -     Obtain Informed Consent; Future      Plan for colonoscopy.     In regards to chronic bright red blood per rectum, differential diagnoses discussed.   Recommend Emergency Room if worsening bleeding.            In regards to black stool,  differential diagnosis discussed.  I would recommend upper endoscopy and she is agreeable.  Further orders pending EGD.      ESOPHAGOGASTRODUODENOSCOPY WITH ANESTHESIA (N/A), COLONOSCOPY WITH ANESTHESIA (N/A)  All risks, benefits, alternatives, and indications of colonoscopy procedure have been discussed with the patient. Risks to include perforation of the colon requiring possible surgery or colostomy, risk of bleeding from biopsies or removal of colon tissue, possibility of missing a colon polyp or cancer, or adverse drug reaction.  Benefits to include the diagnosis and management of disease of the colon and rectum. Alternatives to include barium enema, radiographic evaluation, lab testing or no intervention. Pt verbalizes understanding and agrees.     Risk, benefits, and alternatives of endoscopy were explained in full.  They understand that there is a risk of bleeding, perforation, and infection.  The risk of perforation goes up with esophageal dilation.  Other options to evaluate UGI complaints could involve barium swallow or UGI series, but these would be diagnostic tests only.  Patient was given time to ask questions.  I answered  them to their satisfaction and they are agreeable to proceedingRisk, benefits, and alternatives of endoscopy were explained in full.  They understand that there is a risk of bleeding, perforation, and infection.  The risk of perforation goes up with esophageal dilation.  Other options to evaluate UGI complaints could involve barium swallow or UGI series, but these would be diagnostic tests only.  Patient was given time to ask questions.  I answered them to their satisfaction and they are agreeable to proceeding    Lisette Denton, APRN

## 2022-05-06 ENCOUNTER — LAB (OUTPATIENT)
Dept: LAB | Facility: HOSPITAL | Age: 62
End: 2022-05-06

## 2022-05-06 PROCEDURE — U0005 INFEC AGEN DETEC AMPLI PROBE: HCPCS | Performed by: NURSE PRACTITIONER

## 2022-05-06 PROCEDURE — U0004 COV-19 TEST NON-CDC HGH THRU: HCPCS | Performed by: NURSE PRACTITIONER

## 2022-05-06 PROCEDURE — C9803 HOPD COVID-19 SPEC COLLECT: HCPCS

## 2022-05-07 LAB — SARS-COV-2 ORF1AB RESP QL NAA+PROBE: NOT DETECTED

## 2022-05-09 ENCOUNTER — ANESTHESIA (OUTPATIENT)
Dept: GASTROENTEROLOGY | Facility: HOSPITAL | Age: 62
End: 2022-05-09

## 2022-05-09 ENCOUNTER — ANESTHESIA EVENT (OUTPATIENT)
Dept: GASTROENTEROLOGY | Facility: HOSPITAL | Age: 62
End: 2022-05-09

## 2022-05-09 ENCOUNTER — HOSPITAL ENCOUNTER (OUTPATIENT)
Facility: HOSPITAL | Age: 62
Setting detail: HOSPITAL OUTPATIENT SURGERY
Discharge: HOME | End: 2022-05-09
Attending: INTERNAL MEDICINE | Admitting: INTERNAL MEDICINE

## 2022-05-09 ENCOUNTER — TELEPHONE (OUTPATIENT)
Dept: GASTROENTEROLOGY | Facility: CLINIC | Age: 62
End: 2022-05-09

## 2022-05-09 VITALS
HEART RATE: 70 BPM | HEIGHT: 66 IN | SYSTOLIC BLOOD PRESSURE: 130 MMHG | DIASTOLIC BLOOD PRESSURE: 64 MMHG | TEMPERATURE: 96.8 F | BODY MASS INDEX: 47.09 KG/M2 | OXYGEN SATURATION: 100 % | RESPIRATION RATE: 18 BRPM | WEIGHT: 293 LBS

## 2022-05-09 DIAGNOSIS — K62.5 BRBPR (BRIGHT RED BLOOD PER RECTUM): ICD-10-CM

## 2022-05-09 DIAGNOSIS — Z86.010 HISTORY OF ADENOMATOUS POLYP OF COLON: ICD-10-CM

## 2022-05-09 DIAGNOSIS — K92.1 BLACK STOOL: ICD-10-CM

## 2022-05-09 LAB — GLUCOSE BLDC GLUCOMTR-MCNC: 284 MG/DL (ref 70–130)

## 2022-05-09 PROCEDURE — 82962 GLUCOSE BLOOD TEST: CPT

## 2022-05-09 PROCEDURE — 43251 EGD REMOVE LESION SNARE: CPT | Performed by: INTERNAL MEDICINE

## 2022-05-09 PROCEDURE — 88305 TISSUE EXAM BY PATHOLOGIST: CPT | Performed by: INTERNAL MEDICINE

## 2022-05-09 PROCEDURE — 25010000002 PROPOFOL 10 MG/ML EMULSION

## 2022-05-09 PROCEDURE — G0105 COLORECTAL SCRN; HI RISK IND: HCPCS | Performed by: INTERNAL MEDICINE

## 2022-05-09 DEVICE — DEV CLIP ENDO RESOLUTION360 CONTRL ROT 235CM: Type: IMPLANTABLE DEVICE | Site: STOMACH | Status: FUNCTIONAL

## 2022-05-09 RX ORDER — LIDOCAINE HYDROCHLORIDE 20 MG/ML
INJECTION, SOLUTION EPIDURAL; INFILTRATION; INTRACAUDAL; PERINEURAL AS NEEDED
Status: DISCONTINUED | OUTPATIENT
Start: 2022-05-09 | End: 2022-05-09 | Stop reason: SURG

## 2022-05-09 RX ORDER — LIDOCAINE HYDROCHLORIDE 10 MG/ML
0.5 INJECTION, SOLUTION EPIDURAL; INFILTRATION; INTRACAUDAL; PERINEURAL ONCE AS NEEDED
Status: DISCONTINUED | OUTPATIENT
Start: 2022-05-09 | End: 2022-05-09 | Stop reason: HOSPADM

## 2022-05-09 RX ORDER — SODIUM CHLORIDE 9 MG/ML
500 INJECTION, SOLUTION INTRAVENOUS CONTINUOUS PRN
Status: DISCONTINUED | OUTPATIENT
Start: 2022-05-09 | End: 2022-05-09 | Stop reason: HOSPADM

## 2022-05-09 RX ORDER — ONDANSETRON 2 MG/ML
4 INJECTION INTRAMUSCULAR; INTRAVENOUS ONCE AS NEEDED
Status: DISCONTINUED | OUTPATIENT
Start: 2022-05-09 | End: 2022-05-09 | Stop reason: HOSPADM

## 2022-05-09 RX ORDER — PROPOFOL 10 MG/ML
VIAL (ML) INTRAVENOUS AS NEEDED
Status: DISCONTINUED | OUTPATIENT
Start: 2022-05-09 | End: 2022-05-09 | Stop reason: SURG

## 2022-05-09 RX ORDER — SODIUM CHLORIDE 0.9 % (FLUSH) 0.9 %
10 SYRINGE (ML) INJECTION AS NEEDED
Status: DISCONTINUED | OUTPATIENT
Start: 2022-05-09 | End: 2022-05-09 | Stop reason: HOSPADM

## 2022-05-09 RX ADMIN — SODIUM CHLORIDE 500 ML: 9 INJECTION, SOLUTION INTRAVENOUS at 10:13

## 2022-05-09 RX ADMIN — LIDOCAINE HYDROCHLORIDE 60 MG: 20 INJECTION, SOLUTION EPIDURAL; INFILTRATION; INTRACAUDAL; PERINEURAL at 11:17

## 2022-05-09 RX ADMIN — PROPOFOL 500 MG: 10 INJECTION, EMULSION INTRAVENOUS at 11:17

## 2022-05-09 NOTE — ANESTHESIA POSTPROCEDURE EVALUATION
"Patient: Maria C Shrestha    Procedure Summary     Date: 05/09/22 Room / Location: [facility] ENDOSCOPY 6 / [facility]    Anesthesia Start: 1113 Anesthesia Stop: 1152    Procedures:       ESOPHAGOGASTRODUODENOSCOPY WITH ANESTHESIA (N/A )      COLONOSCOPY WITH ANESTHESIA (N/A ) Diagnosis:       History of adenomatous polyp of colon      BRBPR (bright red blood per rectum)      Black stool      (History of adenomatous polyp of colon [Z86.010])      (BRBPR (bright red blood per rectum) [K62.5])      (Black stool [K92.1])    Surgeons: Mono Linder MD Provider: Britney Meza CRNA    Anesthesia Type: MAC ASA Status: 4          Anesthesia Type: MAC    Vitals  Vitals Value Taken Time   BP 91/43 05/09/22 1156   Temp     Pulse 70 05/09/22 1200   Resp 16 05/09/22 1152   SpO2 100 % 05/09/22 1200   Vitals shown include unvalidated device data.        Post Anesthesia Care and Evaluation    Patient location during evaluation: PHASE II  Patient participation: complete - patient participated  Level of consciousness: awake  Pain management: adequate  Airway patency: patent  Anesthetic complications: No anesthetic complications    Cardiovascular status: acceptable  Respiratory status: acceptable  Hydration status: acceptable    Comments: Blood pressure (!) 96/38, pulse 75, temperature 96.8 °F (36 °C), temperature source Temporal, resp. rate 16, height 166.4 cm (65.5\"), weight (!) 149 kg (329 lb), SpO2 100 %      "

## 2022-05-09 NOTE — ANESTHESIA PREPROCEDURE EVALUATION
Anesthesia Evaluation     Patient summary reviewed   no history of anesthetic complications:  NPO Solid Status: > 8 hours             Airway   Mallampati: II  Dental      Pulmonary    (+) sleep apnea on CPAP,   Cardiovascular   Exercise tolerance: poor (<4 METS)    (+) hypertension,       Neuro/Psych- negative ROS  GI/Hepatic/Renal/Endo    (+) morbid obesity,  renal disease (iHD last 5/6) CRI and dialysis, diabetes mellitus using insulin, thyroid problem hypothyroidism    Musculoskeletal     Abdominal    Substance History      OB/GYN          Other                        Anesthesia Plan    ASA 4     MAC       Anesthetic plan, all risks, benefits, and alternatives have been provided, discussed and informed consent has been obtained with: patient.        CODE STATUS:

## 2022-05-10 DIAGNOSIS — F41.8 MIXED ANXIETY AND DEPRESSIVE DISORDER: ICD-10-CM

## 2022-05-10 RX ORDER — BUSPIRONE HYDROCHLORIDE 10 MG/1
TABLET ORAL
Qty: 180 TABLET | Refills: 0 | Status: SHIPPED | OUTPATIENT
Start: 2022-05-10 | End: 2022-07-11 | Stop reason: SDUPTHER

## 2022-05-12 ENCOUNTER — TELEPHONE (OUTPATIENT)
Dept: GASTROENTEROLOGY | Facility: CLINIC | Age: 62
End: 2022-05-12

## 2022-05-12 NOTE — TELEPHONE ENCOUNTER
I called and discussed her pathology results.  I discussed that 2-3 polyps removed from her stomach are adenomas.  The other was hyperplastic.  I suspect a hyperplastic was a larger polyp which would fit the pathology description as well as the endoscopic description in 2 adenomas were the more distal ones in the body.  All of these were completely removed.  I discussed with her that adenomas are a type of precancerous tissue but not a cancer.  Because of the adenomatous, I do recommend repeat endoscopy examination to relook to see if anything is coming back or new developing.  I suggested an endoscopy examination in 6 months no later than 1 year.  She expressed understanding was in agreement.    We will place her in recall for follow-up endoscopy in 6 months.  If she has not heard from us by 1 December she is to contact us.  She agreed.

## 2022-05-12 NOTE — TELEPHONE ENCOUNTER
Sruthi, send me a copy of the telephone note I just dictated at the time of endoscopy in 6 months please.

## 2022-05-14 ENCOUNTER — HOSPITAL ENCOUNTER (EMERGENCY)
Age: 62
Discharge: HOME | End: 2022-05-14
Attending: EMERGENCY MEDICINE
Payer: MEDICARE

## 2022-05-14 ENCOUNTER — APPOINTMENT (OUTPATIENT)
Dept: CT IMAGING | Age: 62
End: 2022-05-14
Payer: MEDICARE

## 2022-05-14 VITALS
TEMPERATURE: 98 F | RESPIRATION RATE: 18 BRPM | SYSTOLIC BLOOD PRESSURE: 137 MMHG | HEART RATE: 86 BPM | DIASTOLIC BLOOD PRESSURE: 69 MMHG | OXYGEN SATURATION: 99 %

## 2022-05-14 DIAGNOSIS — K59.00 CONSTIPATION, UNSPECIFIED CONSTIPATION TYPE: Primary | ICD-10-CM

## 2022-05-14 LAB
ALBUMIN SERPL-MCNC: 4.2 G/DL (ref 3.5–5.2)
ALP BLD-CCNC: 98 U/L (ref 35–104)
ALT SERPL-CCNC: 12 U/L (ref 5–33)
ANION GAP SERPL CALCULATED.3IONS-SCNC: 19 MMOL/L (ref 7–19)
AST SERPL-CCNC: 14 U/L (ref 5–32)
BILIRUB SERPL-MCNC: <0.2 MG/DL (ref 0.2–1.2)
BUN BLDV-MCNC: 68 MG/DL (ref 8–23)
CALCIUM SERPL-MCNC: 9.8 MG/DL (ref 8.8–10.2)
CHLORIDE BLD-SCNC: 92 MMOL/L (ref 98–111)
CO2: 26 MMOL/L (ref 22–29)
CREAT SERPL-MCNC: 6.1 MG/DL (ref 0.5–0.9)
GFR AFRICAN AMERICAN: 8
GFR NON-AFRICAN AMERICAN: 7
GLUCOSE BLD-MCNC: 180 MG/DL (ref 74–109)
HCT VFR BLD CALC: 31.6 % (ref 37–47)
HEMOGLOBIN: 10.4 G/DL (ref 12–16)
LIPASE: 87 U/L (ref 13–60)
MAGNESIUM: 2.4 MG/DL (ref 1.6–2.4)
MCH RBC QN AUTO: 33.4 PG (ref 27–31)
MCHC RBC AUTO-ENTMCNC: 32.9 G/DL (ref 33–37)
MCV RBC AUTO: 101.6 FL (ref 81–99)
PDW BLD-RTO: 16 % (ref 11.5–14.5)
PLATELET # BLD: 257 K/UL (ref 130–400)
PMV BLD AUTO: 9.7 FL (ref 9.4–12.3)
POTASSIUM REFLEX MAGNESIUM: 3.5 MMOL/L (ref 3.5–5)
RBC # BLD: 3.11 M/UL (ref 4.2–5.4)
SODIUM BLD-SCNC: 137 MMOL/L (ref 136–145)
TOTAL PROTEIN: 7.1 G/DL (ref 6.6–8.7)
WBC # BLD: 13 K/UL (ref 4.8–10.8)

## 2022-05-14 PROCEDURE — 74177 CT ABD & PELVIS W/CONTRAST: CPT

## 2022-05-14 PROCEDURE — 83735 ASSAY OF MAGNESIUM: CPT

## 2022-05-14 PROCEDURE — 85027 COMPLETE CBC AUTOMATED: CPT

## 2022-05-14 PROCEDURE — 36415 COLL VENOUS BLD VENIPUNCTURE: CPT

## 2022-05-14 PROCEDURE — 6360000004 HC RX CONTRAST MEDICATION: Performed by: EMERGENCY MEDICINE

## 2022-05-14 PROCEDURE — 80053 COMPREHEN METABOLIC PANEL: CPT

## 2022-05-14 PROCEDURE — 99285 EMERGENCY DEPT VISIT HI MDM: CPT

## 2022-05-14 PROCEDURE — 83690 ASSAY OF LIPASE: CPT

## 2022-05-14 RX ORDER — FERRIC CITRATE 210 MG/1
2 TABLET, COATED ORAL
COMMUNITY
Start: 2021-10-19 | End: 2022-09-23

## 2022-05-14 RX ADMIN — IOPAMIDOL 80 ML: 755 INJECTION, SOLUTION INTRAVENOUS at 19:07

## 2022-05-14 ASSESSMENT — ENCOUNTER SYMPTOMS
CONSTIPATION: 1
ABDOMINAL PAIN: 1
BLOOD IN STOOL: 0
NAUSEA: 1
DIARRHEA: 0
EYE PAIN: 0
SHORTNESS OF BREATH: 0
VOMITING: 1

## 2022-05-14 NOTE — ED PROVIDER NOTES
[facility] EMERGENCY DEPT  eMERGENCY dEPARTMENT eNCOUnter      Pt Name: Natalie Arambula  MRN: [id number]  [place] 1960  Date of evaluation: 5/14/2022  Provider: Dylon Smith MD    [address] [place] [address]       Chief Complaint   Patient presents with    Abdominal Pain     pt feels like she is impacted, pt had colonoscopy monday and has issues with constipation         HISTORY OF PRESENT ILLNESS   (Location/Symptom, Timing/Onset,Context/Setting, Quality, Duration, Modifying Factors, Severity)  Note limiting factors. Natalie Arambula is a 58 y.o. female who presents to the emergency department due to constipation and abdominal pain. Patient is worried that she is impacted. Said that she has not had a bowel movement in about 5 days. Has also had a little bit of nausea and vomiting. Had a colonoscopy done on Monday and said she had 3 polyps removed. Had a bowel movement the following day but has not had a bowel movement since. Deals with constipation chronically. She is on dialysis and she said several of the binders she has to take cause constipation. Took docusate and mag citrate earlier today without any relief. No blood in her stools when she had a bowel movement earlier this week. Vomited twice this week. No hematemesis. No fever. Makes urine but only small amounts. No dysuria or flank pain. Occasionally having some crampy lower abdominal pain that has been mild. HPI    NursingNotes were reviewed. REVIEW OF SYSTEMS    (2-9 systems for level 4, 10 or more for level 5)     Review of Systems   Constitutional: Negative for fever. Eyes: Negative for pain. Respiratory: Negative for shortness of breath. Cardiovascular: Negative for chest pain and palpitations. Gastrointestinal: Positive for abdominal pain, constipation, nausea and vomiting (x2 over the past week). Negative for blood in stool and diarrhea. Genitourinary: Negative for dysuria. Skin: Negative for rash.    Neurological: Negative for weakness and headaches. All other systems reviewed and are negative. A complete review of systems was performed and is negative except as noted above in the HPI. PAST MEDICAL HISTORY     Past Medical History:   Diagnosis Date    Anxiety     Arthritis     CKD (chronic kidney disease)     stage 4    Colon polyp     Depression     Embolism - blood clot 2004    Hemodialysis patient ([address]     dialysis mon - friday x 2 weeks.     Hx of blood clots     Hyperlipidemia     Hypertension     Obesity     Sleep apnea     Thyroid disease     Type II or unspecified type diabetes mellitus without mention of complication, not stated as uncontrolled          SURGICAL HISTORY       Past Surgical History:   Procedure Laterality Date    CHOLECYSTECTOMY      DIALYSIS CATHETER INSERTION N/A 8/11/2021    INSERTION CATHETER DIALYSIS performed by Asencion Essex, MD at [place] [id number] Left 06/18/2021    LEFT BRACHIAL-CEPHALIC AV FISTULA CREATION performed by Asencion Essex, MD at [place] [id number] Left 7/27/2021    LEFT UPPER EXTREMITY CEPHALIC VEIN SUPERFICIALIZATION performed by Asencion Essex, MD at [place] [address]  10/17/2018    POLYPECTOMY      RECTAL SURGERY      fistula repair    TONSILLECTOMY AND ADENOIDECTOMY      VASCULAR SURGERY  07/12/2021    [facility]- Ultrasound-guided cannulation left distal upper arm cephalic vein with 4 Armenian glide sheath, Left upper extremity fistulogram's including venography the superior vena cava    VASCULAR SURGERY  07/27/2021    [facility]- Superficialization of the left upper arm cephalic vein arteriovenous fistula         CURRENT MEDICATIONS       Discharge Medication List as of 5/14/2022  9:43 PM      CONTINUE these medications which have NOT CHANGED    Details   Ferric Citrate (AURYXIA) 1  MG(Fe) TABS Take 2 tablets by mouthHistorical Med      b complex-C-folic acid (NEPHROCAPS) 1 MG capsule Take 1 capsule by mouth daily, Disp-30 capsule, [id number]      TRULICITY 3 PL/1.1NG SOPN 3 mg once a week , DAWHistorical Med      levothyroxine (SYNTHROID) 88 MCG tablet Take 88 mcg by mouth Daily Historical Med      rosuvastatin (CRESTOR) 20 MG tablet Take 20 mg by mouth daily Historical Med      cetirizine (ZYRTEC) 10 MG tablet Take 10 mg by mouth as needed for Allergies Historical Med      diazePAM (VALIUM) 2 MG tablet Take 2 mg by mouth as needed for Anxiety.  Historical Med      fluticasone (FLONASE) 50 MCG/ACT nasal spray 2 sprays by Nasal route daily as needed for Rhinitis Historical Med      insulin lispro (HUMALOG KWIKPEN) 200 UNIT/ML SOPN pen Inject 25-80 Units into the skin 3 times daily (with meals) Historical Med      Insulin Pen Needle (B-D ULTRAFINE III SHORT PEN) 31G X 8 MM MISC Historical Med      Insulin Syringe-Needle U-100 (BD INSULIN SYRINGE U/F) 31G X 5/16\" 1 ML MISC Historical Med      FreeStyle Lancets MISC Historical Med      Omega-3 Fatty Acids (FISH OIL) 1000 MG CAPS Take 1,000 mg by mouth Daily with supper Historical Med      epoetin jose ramon (EPOGEN;PROCRIT) 50592 UNIT/ML injection Inject 10,000 Units into the skin every 30 days Historical Med      lamoTRIgine (LAMICTAL) 100 MG tablet Take 100 mg by mouth daily Indications: 1/2 tablet with dinner      allopurinol (ZYLOPRIM) 100 MG tablet Take 100 mg by mouth 2 times daily      citalopram (CELEXA) 40 MG tablet Take 40 mg by mouth daily      busPIRone (BUSPAR) 10 MG tablet Take 20 mg by mouth 2 times daily      aspirin 81 MG tablet Take 81 mg by mouth daily With Dinner      Insulin Glargine (LANTUS SC) Inject 70 Units into the skin daily With Evening MealsHistorical Med             ALLERGIES     Codeine    FAMILY HISTORY       Family History   Problem Relation Age of Onset    Lung Cancer Mother     Brain Cancer Mother     Heart Attack Father     Prostate Cancer Father     Heart Disease Father     Stroke Father     Uterine Cancer Maternal Grandmother     Cervical Cancer Maternal Grandmother     Diabetes Maternal Grandfather     Other Maternal Grandfather         histoplasmosis    Diabetes Paternal Grandfather           SOCIAL HISTORY       Social History     Socioeconomic History    Marital status:      Spouse name: None    Number of children: None    Years of education: None    Highest education level: None   Occupational History    None   Tobacco Use    Smoking status: Never Smoker    Smokeless tobacco: Never Used   Vaping Use    Vaping Use: Never used   Substance and Sexual Activity    Alcohol use: No    Drug use: No    Sexual activity: Never   Other Topics Concern    None   Social History Narrative    None     Social Determinants of Health     Financial Resource Strain:     Difficulty of Paying Living Expenses: Not on file   Food Insecurity:     Worried About Running Out of Food in the Last Year: Not on file    Jocelyn of Food in the Last Year: Not on file   Transportation Needs:     Lack of Transportation (Medical): Not on file    Lack of Transportation (Non-Medical):  Not on file   Physical Activity:     Days of Exercise per Week: Not on file    Minutes of Exercise per Session: Not on file   Stress:     Feeling of Stress : Not on file   Social Connections:     Frequency of Communication with Friends and Family: Not on file    Frequency of Social Gatherings with Friends and Family: Not on file    Attends Buddhism Services: Not on file    Active Member of Clubs or Organizations: Not on file    Attends Club or Organization Meetings: Not on file    Marital Status: Not on file   Intimate Partner Violence:     Fear of Current or Ex-Partner: Not on file    Emotionally Abused: Not on file    Physically Abused: Not on file    Sexually Abused: Not on file   Housing Stability:     Unable to Pay for Housing in the Last Year: Not on file    Number of Places Lived in the Last Year: Not on file    Unstable Housing in the Last Year: Not on file       SCREENINGS    Sherrie Coma Scale  Eye Opening: Spontaneous  Best Verbal Response: Oriented  Best Motor Response: Obeys commands  Sherrie Coma Scale Score: 15        PHYSICAL EXAM    (up to 7 for level 4, 8 or more for level 5)     ED Triage Vitals [05/14/22 1803]   BP Temp Temp src Pulse Resp SpO2 Height Weight   (!) 166/75 98 °F (36.7 °C) -- 83 18 97 % -- --       Physical Exam  Vitals reviewed. Exam conducted with a chaperone present. Constitutional:       General: She is not in acute distress. Appearance: She is well-developed. She is obese. HENT:      Head: Normocephalic and atraumatic. Eyes:      General: No scleral icterus. Pupils: Pupils are equal, round, and reactive to light. Neck:      Vascular: No JVD. Cardiovascular:      Rate and Rhythm: Normal rate and regular rhythm. Pulses: Normal pulses. Heart sounds: Normal heart sounds. Pulmonary:      Effort: Pulmonary effort is normal. No respiratory distress. Breath sounds: Normal breath sounds. Abdominal:      General: There is no distension. Palpations: Abdomen is soft. There is no pulsatile mass. Tenderness: There is no abdominal tenderness. There is no guarding or rebound. Genitourinary:     Rectum: Normal anal tone. Comments: No stool in the rectal vault on digital rectal exam  Musculoskeletal:         General: No tenderness. Cervical back: Normal range of motion and neck supple. Right lower leg: No edema. Left lower leg: No edema. Comments: AV Fistula palpable thrill left arm. Skin:     General: Skin is warm and dry. Capillary Refill: Capillary refill takes less than 2 seconds. Neurological:      General: No focal deficit present. Mental Status: She is alert and oriented to person, place, and time.    Psychiatric:         Behavior: Behavior normal.         DIAGNOSTIC RESULTS     EKG: All EKG's are interpreted by the Emergency Department Physician who either signs or Co-signs this chart in the absence of a cardiologist.        RADIOLOGY:   Non-plain film images such as CT, Ultrasound and MRI are read by the radiologist. Plainradiographic images are visualized and preliminarily interpreted by the emergency physician with the below findings:        Interpretation per the Radiologist below, if available at the time of this note:    CT ABDOMEN PELVIS W IV CONTRAST Additional Contrast? None   Final Result   1. No acute process in the abdomen or pelvis. 2. Large volume colonic stool, appearance favoring constipation. No   inflammatory changes along the bowel. Signed by Dr Talib Ugarte            ED BEDSIDE ULTRASOUND:   Performed by ED Physician - none    LABS:  Labs Reviewed   CBC - Abnormal; Notable for the following components:       Result Value    WBC 13.0 (*)     RBC 3.11 (*)     Hemoglobin 10.4 (*)     Hematocrit 31.6 (*)     .6 (*)     MCH 33.4 (*)     MCHC 32.9 (*)     RDW 16.0 (*)     All other components within normal limits   COMPREHENSIVE METABOLIC PANEL W/ REFLEX TO MG FOR LOW K - Abnormal; Notable for the following components:    Chloride 92 (*)     Glucose 180 (*)     BUN 68 (*)     CREATININE 6.1 (*)     GFR Non- 7 (*)     GFR  8 (*)     All other components within normal limits   LIPASE - Abnormal; Notable for the following components:    Lipase 87 (*)     All other components within normal limits   MAGNESIUM       All other labs were within normal range or not returned as of this dictation. EMERGENCY DEPARTMENT COURSE and DIFFERENTIALDIAGNOSIS/MDM:   Vitals:    Vitals:    05/14/22 1902 05/14/22 1931 05/14/22 2002 05/14/22 2142   BP: 137/68 134/67 137/69 137/69   Pulse:    86   Resp:    18   Temp:       SpO2: 96% 96% 96% 99%       MDM  Reviewed patient's results with her. CT scan shows evidence of constipation. Offered enema which patient wishes to proceed with.       After soapsuds enema patient had very large bowel movement. She feels remarkably better. Tolerating oral intake. No vomiting here. Told patient important that she have dialysis Monday as scheduled especially given that she had IV contrast tonight. To follow-up with her doctors and return to the ER for any change worsening symptoms or new concerns. Patient agreeable plan. CONSULTS:  None    PROCEDURES:  Unless otherwise notedbelow, none     Procedures    FINAL IMPRESSION     1.  Constipation, unspecified constipation type          DISPOSITION/PLAN   DISPOSITION Decision To Discharge 05/14/2022 09:38:38 PM      PATIENT REFERRED TO:  @FUP@    DISCHARGE MEDICATIONS:  Discharge Medication List as of 5/14/2022  9:43 PM             (Please note that portions of this note were completed with a voice recognition program.  Efforts were made to edit the dictations butoccasionally words are mis-transcribed.)    Perla Fofana MD (electronically signed)  AttendingEmergency Physician          Perla Fofana MD  05/14/22 8852

## 2022-05-24 ENCOUNTER — OFFICE VISIT (OUTPATIENT)
Dept: FAMILY MEDICINE CLINIC | Facility: CLINIC | Age: 62
End: 2022-05-24

## 2022-05-24 VITALS
SYSTOLIC BLOOD PRESSURE: 134 MMHG | WEIGHT: 293 LBS | HEART RATE: 82 BPM | DIASTOLIC BLOOD PRESSURE: 57 MMHG | OXYGEN SATURATION: 96 % | BODY MASS INDEX: 48.82 KG/M2 | TEMPERATURE: 97.1 F | HEIGHT: 65 IN

## 2022-05-24 DIAGNOSIS — F41.9 ANXIETY: Primary | ICD-10-CM

## 2022-05-24 DIAGNOSIS — K31.7 HYPERPLASTIC POLYPS OF STOMACH: ICD-10-CM

## 2022-05-24 PROCEDURE — 99214 OFFICE O/P EST MOD 30 MIN: CPT | Performed by: PEDIATRICS

## 2022-05-24 RX ORDER — CALCITRIOL 0.5 UG/1
1 CAPSULE, LIQUID FILLED ORAL DAILY
COMMUNITY
Start: 2022-05-03 | End: 2022-05-24

## 2022-05-24 RX ORDER — BUSPIRONE HYDROCHLORIDE 10 MG/1
2 TABLET ORAL
COMMUNITY
Start: 2022-05-10 | End: 2022-05-24

## 2022-05-24 RX ORDER — CARVEDILOL 6.25 MG/1
1 TABLET ORAL
COMMUNITY
Start: 2022-05-17

## 2022-05-24 RX ORDER — CETIRIZINE HYDROCHLORIDE 10 MG/1
1 TABLET ORAL
COMMUNITY
Start: 2022-05-10

## 2022-05-24 RX ORDER — DIAZEPAM 2 MG/1
2 TABLET ORAL AS NEEDED
Qty: 30 TABLET | Refills: 0 | Status: SHIPPED | OUTPATIENT
Start: 2022-05-24 | End: 2022-10-31

## 2022-05-24 RX ORDER — ALLOPURINOL 100 MG/1
1 TABLET ORAL
COMMUNITY
Start: 2022-05-10

## 2022-05-24 RX ORDER — CLONIDINE HYDROCHLORIDE 0.1 MG/1
1 TABLET ORAL AS NEEDED
COMMUNITY
Start: 2022-03-31 | End: 2022-10-31

## 2022-05-24 RX ORDER — POLYETHYLENE GLYCOL 3350, SODIUM SULFATE ANHYDROUS, SODIUM BICARBONATE, SODIUM CHLORIDE, POTASSIUM CHLORIDE 236; 22.74; 6.74; 5.86; 2.97 G/4L; G/4L; G/4L; G/4L; G/4L
POWDER, FOR SOLUTION ORAL
COMMUNITY
Start: 2022-04-20 | End: 2022-10-31

## 2022-05-24 NOTE — PROGRESS NOTES
"Chief Complaint  Anxiety and Depression    Subjective          Maria C Shrestha presents to [facility]  Patient is here today for a follow up of Anxiety and Depression. Patient states she needs a refill of valium. Other medication are working fine.     Anxiety      Her past medical history is significant for depression.   Depression      Objective   Vital Signs:  /57   Pulse 82   Temp 97.1 °F (36.2 °C)   Ht 165.1 cm (65\")   Wt (!) 149 kg (327 lb 6.4 oz)   SpO2 96%   BMI 54.48 kg/m²   Class 3 Severe Obesity (BMI >=40). Obesity-related health conditions include the following: hypertension, diabetes mellitus and GERD. Obesity is unchanged. BMI is is above average; BMI management plan is completed. We discussed low calorie, low carb based diet program, portion control and increasing exercise.      Physical Exam  Constitutional:       Appearance: Normal appearance. She is well-developed. She is obese.   HENT:      Head: Normocephalic and atraumatic.      Right Ear: External ear normal.      Left Ear: External ear normal.      Nose: Nose normal. No nasal tenderness or congestion.      Mouth/Throat:      Lips: Pink. No lesions.      Mouth: Mucous membranes are moist. No oral lesions.      Dentition: Normal dentition.      Pharynx: Oropharynx is clear. No pharyngeal swelling, oropharyngeal exudate or posterior oropharyngeal erythema.   Eyes:      General: Lids are normal. Vision grossly intact. No scleral icterus.        Right eye: No discharge.         Left eye: No discharge.      Extraocular Movements: Extraocular movements intact.      Conjunctiva/sclera: Conjunctivae normal.      Right eye: Right conjunctiva is not injected.      Left eye: Left conjunctiva is not injected.      Pupils: Pupils are equal, round, and reactive to light.   Cardiovascular:      Rate and Rhythm: Normal rate and regular rhythm.      Heart sounds: Normal heart sounds. No murmur heard.    No gallop. "   Pulmonary:      Effort: Pulmonary effort is normal.      Breath sounds: Normal breath sounds and air entry. No wheezing, rhonchi or rales.   Musculoskeletal:         General: No tenderness or deformity. Normal range of motion.      Cervical back: Full passive range of motion without pain, normal range of motion and neck supple.      Right lower leg: No edema.      Left lower leg: No edema.   Skin:     General: Skin is warm and dry.      Coloration: Skin is not jaundiced.      Findings: No rash.   Neurological:      Mental Status: She is alert and oriented to person, place, and time.      Cranial Nerves: Cranial nerves are intact.      Sensory: Sensation is intact.      Motor: Motor function is intact.      Coordination: Coordination is intact.      Gait: Gait is intact.   Psychiatric:         Attention and Perception: Attention normal.         Mood and Affect: Mood and affect normal.         Behavior: Behavior is not hyperactive. Behavior is cooperative.         Thought Content: Thought content normal.         Judgment: Judgment normal.        Result Review :                 Assessment and Plan    Diagnoses and all orders for this visit:    1. Anxiety (Primary)  Assessment & Plan:  She has incredible stressors of her family issues. Will refill her valium    Orders:  -     diazePAM (VALIUM) 2 MG tablet; Take 1 tablet by mouth As Needed for Anxiety.  Dispense: 30 tablet; Refill: 0    2. Hyperplastic polyps of stomach  Assessment & Plan:  These were precancerous and follow up is being scheduled             Follow Up   No follow-ups on file.  Patient was given instructions and counseling regarding her condition or for health maintenance advice. Please see specific information pulled into the AVS if appropriate.

## 2022-06-02 ENCOUNTER — LAB (OUTPATIENT)
Dept: LAB | Facility: HOSPITAL | Age: 62
End: 2022-06-02

## 2022-06-02 LAB
25(OH)D3 SERPL-MCNC: 67.4 NG/ML (ref 30–100)
ALBUMIN SERPL-MCNC: 4.1 G/DL (ref 3.5–5)
ALBUMIN/GLOB SERPL: 1.2 G/DL (ref 1.1–2.5)
ALP SERPL-CCNC: 137 U/L (ref 24–120)
ALT SERPL W P-5'-P-CCNC: 17 U/L (ref 0–35)
ANION GAP SERPL CALCULATED.3IONS-SCNC: 13 MMOL/L (ref 4–13)
AST SERPL-CCNC: 17 U/L (ref 7–45)
AUTO MIXED CELLS #: 0.6 10*3/MM3 (ref 0.1–2.6)
AUTO MIXED CELLS %: 5.7 % (ref 0.1–24)
BILIRUB SERPL-MCNC: 0.3 MG/DL (ref 0.1–1)
BUN SERPL-MCNC: 57 MG/DL (ref 5–21)
BUN/CREAT SERPL: 10.6
CALCIUM SPEC-SCNC: 9.6 MG/DL (ref 8.4–10.4)
CHLORIDE SERPL-SCNC: 93 MMOL/L (ref 98–110)
CHOLEST SERPL-MCNC: 161 MG/DL (ref 130–200)
CO2 SERPL-SCNC: 28 MMOL/L (ref 24–31)
CREAT SERPL-MCNC: 5.36 MG/DL (ref 0.5–1.4)
EGFRCR SERPLBLD CKD-EPI 2021: 8.5 ML/MIN/1.73
ERYTHROCYTE [DISTWIDTH] IN BLOOD BY AUTOMATED COUNT: 16.6 % (ref 12.3–15.4)
GLOBULIN UR ELPH-MCNC: 3.5 GM/DL
GLUCOSE SERPL-MCNC: 328 MG/DL (ref 70–100)
HBA1C MFR BLD: 9 % (ref 4.8–5.9)
HCT VFR BLD AUTO: 27.4 % (ref 34–46.6)
HDLC SERPL-MCNC: 34 MG/DL
HGB BLD-MCNC: 9.4 G/DL (ref 12–15.9)
LDLC SERPL CALC-MCNC: 51 MG/DL (ref 0–99)
LDLC/HDLC SERPL: 0.72 {RATIO}
LYMPHOCYTES # BLD AUTO: 1.6 10*3/MM3 (ref 0.7–3.1)
LYMPHOCYTES NFR BLD AUTO: 15.5 % (ref 19.6–45.3)
MCH RBC QN AUTO: 34.1 PG (ref 26.6–33)
MCHC RBC AUTO-ENTMCNC: 34.3 G/DL (ref 31.5–35.7)
MCV RBC AUTO: 99.3 FL (ref 79–97)
NEUTROPHILS NFR BLD AUTO: 78.8 % (ref 42.7–76)
NEUTROPHILS NFR BLD AUTO: 8.2 10*3/MM3 (ref 1.7–7)
PLATELET # BLD AUTO: 242 10*3/MM3 (ref 140–450)
PMV BLD AUTO: 8.6 FL (ref 6–12)
POTASSIUM SERPL-SCNC: 3.4 MMOL/L (ref 3.5–5.3)
PROT SERPL-MCNC: 7.6 G/DL (ref 6.3–8.7)
RBC # BLD AUTO: 2.76 10*6/MM3 (ref 3.77–5.28)
SODIUM SERPL-SCNC: 134 MMOL/L (ref 135–145)
TRIGL SERPL-MCNC: 513 MG/DL (ref 0–149)
TSH SERPL DL<=0.05 MIU/L-ACNC: 2.7 UIU/ML (ref 0.27–4.2)
VIT B12 BLD-MCNC: 759 PG/ML (ref 211–946)
VLDLC SERPL-MCNC: 76 MG/DL (ref 5–40)
WBC NRBC COR # BLD: 10.4 10*3/MM3 (ref 3.4–10.8)

## 2022-06-02 PROCEDURE — 36415 COLL VENOUS BLD VENIPUNCTURE: CPT | Performed by: INTERNAL MEDICINE

## 2022-06-02 PROCEDURE — 85025 COMPLETE CBC W/AUTO DIFF WBC: CPT | Performed by: INTERNAL MEDICINE

## 2022-06-02 PROCEDURE — 82306 VITAMIN D 25 HYDROXY: CPT | Performed by: INTERNAL MEDICINE

## 2022-06-02 PROCEDURE — 82570 ASSAY OF URINE CREATININE: CPT | Performed by: INTERNAL MEDICINE

## 2022-06-02 PROCEDURE — 84443 ASSAY THYROID STIM HORMONE: CPT | Performed by: INTERNAL MEDICINE

## 2022-06-02 PROCEDURE — 83036 HEMOGLOBIN GLYCOSYLATED A1C: CPT | Performed by: INTERNAL MEDICINE

## 2022-06-02 PROCEDURE — 82607 VITAMIN B-12: CPT | Performed by: INTERNAL MEDICINE

## 2022-06-02 PROCEDURE — 82043 UR ALBUMIN QUANTITATIVE: CPT | Performed by: INTERNAL MEDICINE

## 2022-06-02 PROCEDURE — 80053 COMPREHEN METABOLIC PANEL: CPT | Performed by: INTERNAL MEDICINE

## 2022-06-02 PROCEDURE — 80061 LIPID PANEL: CPT | Performed by: INTERNAL MEDICINE

## 2022-06-03 LAB
ALBUMIN UR-MCNC: 95.2 MG/DL
CREAT UR-MCNC: 234.2 MG/DL
MICROALBUMIN/CREAT UR: 406.5 MG/G

## 2022-06-08 ENCOUNTER — OFFICE VISIT (OUTPATIENT)
Dept: ENDOCRINOLOGY | Facility: CLINIC | Age: 62
End: 2022-06-08

## 2022-06-08 ENCOUNTER — LAB (OUTPATIENT)
Dept: LAB | Facility: HOSPITAL | Age: 62
End: 2022-06-08

## 2022-06-08 VITALS
OXYGEN SATURATION: 98 % | WEIGHT: 293 LBS | DIASTOLIC BLOOD PRESSURE: 40 MMHG | HEIGHT: 65 IN | SYSTOLIC BLOOD PRESSURE: 100 MMHG | HEART RATE: 87 BPM | BODY MASS INDEX: 48.82 KG/M2

## 2022-06-08 DIAGNOSIS — Z99.2 TYPE 2 DIABETES MELLITUS WITH CHRONIC KIDNEY DISEASE ON CHRONIC DIALYSIS, WITH LONG-TERM CURRENT USE OF INSULIN: Primary | ICD-10-CM

## 2022-06-08 DIAGNOSIS — E11.59 HYPERTENSION ASSOCIATED WITH DIABETES: ICD-10-CM

## 2022-06-08 DIAGNOSIS — E55.9 VITAMIN D DEFICIENCY: ICD-10-CM

## 2022-06-08 DIAGNOSIS — E03.9 ACQUIRED HYPOTHYROIDISM: ICD-10-CM

## 2022-06-08 DIAGNOSIS — I15.2 HYPERTENSION ASSOCIATED WITH DIABETES: ICD-10-CM

## 2022-06-08 DIAGNOSIS — E78.2 MIXED DIABETIC HYPERLIPIDEMIA ASSOCIATED WITH TYPE 2 DIABETES MELLITUS: ICD-10-CM

## 2022-06-08 DIAGNOSIS — E53.8 B12 DEFICIENCY: ICD-10-CM

## 2022-06-08 DIAGNOSIS — Z79.4 TYPE 2 DIABETES MELLITUS WITH CHRONIC KIDNEY DISEASE ON CHRONIC DIALYSIS, WITH LONG-TERM CURRENT USE OF INSULIN: Primary | ICD-10-CM

## 2022-06-08 DIAGNOSIS — E11.69 MIXED DIABETIC HYPERLIPIDEMIA ASSOCIATED WITH TYPE 2 DIABETES MELLITUS: ICD-10-CM

## 2022-06-08 DIAGNOSIS — N18.6 TYPE 2 DIABETES MELLITUS WITH CHRONIC KIDNEY DISEASE ON CHRONIC DIALYSIS, WITH LONG-TERM CURRENT USE OF INSULIN: Primary | ICD-10-CM

## 2022-06-08 DIAGNOSIS — E11.22 TYPE 2 DIABETES MELLITUS WITH CHRONIC KIDNEY DISEASE ON CHRONIC DIALYSIS, WITH LONG-TERM CURRENT USE OF INSULIN: Primary | ICD-10-CM

## 2022-06-08 PROCEDURE — 87086 URINE CULTURE/COLONY COUNT: CPT | Performed by: INTERNAL MEDICINE

## 2022-06-08 PROCEDURE — 99214 OFFICE O/P EST MOD 30 MIN: CPT | Performed by: INTERNAL MEDICINE

## 2022-06-08 PROCEDURE — 81001 URINALYSIS AUTO W/SCOPE: CPT | Performed by: INTERNAL MEDICINE

## 2022-06-08 PROCEDURE — 95251 CONT GLUC MNTR ANALYSIS I&R: CPT | Performed by: INTERNAL MEDICINE

## 2022-06-08 RX ORDER — SEMAGLUTIDE 2.68 MG/ML
2 INJECTION, SOLUTION SUBCUTANEOUS WEEKLY
Qty: 3 PEN | Refills: 3 | Status: SHIPPED | OUTPATIENT
Start: 2022-06-08 | End: 2023-01-06 | Stop reason: SDUPTHER

## 2022-06-08 RX ORDER — GLUCAGON 3 MG/1
1 POWDER NASAL AS NEEDED
Qty: 2 EACH | Refills: 11 | Status: SHIPPED | OUTPATIENT
Start: 2022-06-08 | End: 2022-10-31

## 2022-06-08 RX ORDER — DULAGLUTIDE 4.5 MG/.5ML
4.5 INJECTION, SOLUTION SUBCUTANEOUS WEEKLY
Qty: 12 PEN | Refills: 3 | Status: SHIPPED | OUTPATIENT
Start: 2022-06-08 | End: 2022-06-08

## 2022-06-08 NOTE — PROGRESS NOTES
"Maria C Shrestha is a 62 y.o. female who presents for  evaluation of   Type 2 diabetes            Primary Care / Referring Provider  Ole Soliman MD    History of Present Illness  Duration/Timing:  Diabetes mellitus type 2          Severity (Complications/Hospitalizations)  Secondary Microvascular Complications:  Diabetic Nephropathy-ESRD, No Diabetic Neuropathy, No retinopathy     Macrovascular  , Carotid Artery Disease      Context  Diabetes Regimen:  Insulin, Compliant with regimen  Blood Glucose Readings  Running high         Diet    counts carbs, eating only 45 g cho per meal     Exercise:  Does not exercise    Associated Signs/Symptoms  Hyperglycemic Symptoms:  No polyuria, No polydipsia, No polyphagia, Weight loss 70 lbs since HD   Hypoglycemic Episodes:  No documented hypoglycemia      Since last appt started HD     .  PE    /40   Pulse 87   Ht 165.1 cm (65\")   Wt (!) 161 kg (355 lb)   LMP  (LMP Unknown)   SpO2 98%   BMI 59.08 kg/m²   AOx3  No visible goiter  Normal Respiratory Effort , Lung CTA  RRR  No Edema    Labs    Lab Results   Component Value Date    WBC 10.40 06/02/2022    HGB 9.4 (L) 06/02/2022    HCT 27.4 (L) 06/02/2022    MCV 99.3 (H) 06/02/2022     06/02/2022     Lab Results   Component Value Date    GLUCOSE 328 (H) 06/02/2022    BUN 57 (H) 06/02/2022    CREATININE 5.36 (H) 06/02/2022    EGFRIFNONA 12 (L) 02/23/2022    EGFRIFAFRI  09/01/2021      Comment:      <15 Indicative of kidney failure.    BCR 10.6 06/02/2022     (L) 06/02/2022    K 3.4 (L) 06/02/2022    CO2 28.0 06/02/2022    CALCIUM 9.6 06/02/2022    ALBUMIN 4.10 06/02/2022    AST 17 06/02/2022    ALT 17 06/02/2022         Assessment & Plan       ICD-10-CM ICD-9-CM   1. Type 2 diabetes mellitus with chronic kidney disease on chronic dialysis, with long-term current use of insulin ([facility])  E11.22 250.40    N18.6 585.9    Z99.2 V45.11    Z79.4 V58.67   2. Hypertension associated with diabetes ([facility])  E11.59 250.80    " I15.2 401.9   3. Mixed diabetic hyperlipidemia associated with type 2 diabetes mellitus (HCC)  E11.69 250.80    E78.2 272.2   4. B12 deficiency  E53.8 266.2   5. Vitamin D deficiency  E55.9 268.9   6. Acquired hypothyroidism  E03.9 244.9       Glycemic Management:   Lab Results   Component Value Date    HGBA1C 9.0 (H) 06/02/2022    HGBA1C 11.5 (H) 02/23/2022    HGBA1C 6.6 (H) 08/10/2021     Lab Results   Component Value Date    GLUCOSE 328 (H) 06/02/2022    BUN 57 (H) 06/02/2022    CREATININE 5.36 (H) 06/02/2022    EGFRIFNONA 12 (L) 02/23/2022    EGFRIFAFRI  09/01/2021      Comment:      <15 Indicative of kidney failure.    BCR 10.6 06/02/2022    CO2 28.0 06/02/2022    CALCIUM 9.6 06/02/2022    ALBUMIN 4.10 06/02/2022    AST 17 06/02/2022    ALT 17 06/02/2022     Lab Results   Component Value Date    WBC 10.40 06/02/2022    HGB 9.4 (L) 06/02/2022    HCT 27.4 (L) 06/02/2022    MCV 99.3 (H) 06/02/2022     06/02/2022     Lab Results   Component Value Date    CREATININE 5.36 (H) 06/02/2022    CREATININE 3.69 (H) 02/23/2022    CREATININE 4.86 (H) 09/01/2021    CREATININE 3.09 (H) 07/28/2021    CREATININE 3.45 (H) 07/21/2021        Using deidra 14 ( can't upgrade to type 2 )  Type 2 DM w hyperglycemia   TIR 1% , no lows last time  Now TIR 15% , no lows        Trulicity   3 mg weekly - change to ozempic 2 mg  Has constipation , better at managing it      Humulin U 500     140 am and 100 pm   Change to     160 am and 120 pm     Change to omnipod     Basal 1 unit per hour   Bolus 20 am and 10 pm         Approve for baqsimi   Lipid Management    Lab Results   Component Value Date    CHOL 161 06/02/2022    CHLPL 172 07/14/2016    TRIG 513 (H) 06/02/2022    HDL 34 (L) 06/02/2022    LDL 51 06/02/2022         crestor and repatha, we will keep unless nephrology suggests otherwise     Blood Pressure Management:    Vitals:    06/08/22 1417   BP: 100/40   Pulse: 87   SpO2: 98%         Per nephrology -        Microvascular  Complication Monitoring:        ckd stage V    followed by nephrology      Eyes,  nl     No Neuropathy      Weight Related:   Wt Readings from Last 3 Encounters:   22 (!) 161 kg (355 lb)   22 (!) 149 kg (327 lb 6.4 oz)   22 (!) 149 kg (329 lb)     Body mass index is 59.08 kg/m².      prefers no bariatric surgery    Now on cpap       Bone Health    Lab Results   Component Value Date    .3 (H) 2021    CALCIUM 9.6 2022       Lab Results   Component Value Date    IGMN63HV 67.4 2022    ABSZ44BZ 73.8 2022    FJMP34YG 28.4 2021         For JARETH       Per nephrology       Thyroid Health  Lab Results   Component Value Date    TSH 2.700 2022     88 mcgs daily of levothyroixne     Other Diabetes Related Aspects       Lab Results   Component Value Date    NCJXHYAG13 759 2022     Lab Results   Component Value Date    WBC 10.40 2022    HGB 9.4 (L) 2022    HCT 27.4 (L) 2022    MCV 99.3 (H) 2022     2022     Lab Results   Component Value Date    IRON 171 (H) 2022    TIBC 285 (L) 2022    FERRITIN 1,696.00 (H) 2022       Anemia , managed by nephrology     On procrit    Had Hysterectomy     Systolic Murmur, AS? Echo   Could be due to anemia     Echo and carotid US  , normal echo and less than 50% blockage in carotids, repeat       ===========      Wilman Patel MD          This document has been electronically signed by Wilman Patel MD on 2022 14:38 CDT        Patient Demographics    Patient Name   Maria C Shrestha Legal Sex   Female    1960 [address]    Address   [address] [place] [address] [address]   [place] [id number] Phone   [phone] (Home)   [phone] (Mobile) *Preferred*       Patient Contacts    Name Relation Home Work Mobile   Rodri Shrestha Spouse [phone]         PCP and Center    Primary Care Provider   Ole Soliman MD Phone   [phone] [facility]  "[place]       Patient Employment    Status   Disabled          Active Insurance as of 6/8/2022      MEDICARE - MEDICARE A & B    Payor Plan Insurance Group Employer/Plan Group   MEDICARE MEDICARE A & B     Payor Plan Address Payor Plan Phone Number Payor Plan Fax Number Effective Dates   [address] [phone]  11/1/2013 - None Entered   [facility] [id number]      Subscriber Name Subscriber Birth Date Member ID    ILEANA STEVEN 1960 [id number]      [place]    Payor Plan Insurance Group Employer/Plan Group   [place]     Payor Plan Address Payor Plan Phone Number Payor Plan Fax Number Effective Dates   [address] [phone]  4/10/2020 - None Entered   [place] [address]      Subscriber Name Subscriber Birth Date Member ID    ILEANA STEVEN 1960 [id number]        Current Medications    allopurinol (ZYLOPRIM) 100 MG tablet   aspirin 81 MG tablet   BD Insulin Syringe U/F 31G X 5/16\" 1 ML misc   busPIRone (BUSPAR) 10 MG tablet   calcitriol (ROCALTROL) 0.5 MCG capsule   carvedilol (COREG) 6.25 MG tablet   cetirizine (zyrTEC) 10 MG tablet   citalopram (CeleXA) 40 MG tablet   cloNIDine (CATAPRES) 0.1 MG tablet   diazePAM (VALIUM) 2 MG tablet   Docusate Calcium (STOOL SOFTENER PO)   epoetin elsa (EPOGEN,PROCRIT) 17959 UNIT/ML injection   Evolocumab with Infusor (REPATHA) solution cartridge   Ferric Citrate 1  MG(Fe) tablet   fluticasone (FLONASE) 50 MCG/ACT nasal spray   Glucose Blood (BLOOD GLUCOSE TEST) strip   Insulin Pen Needle (B-D ULTRAFINE III SHORT PEN) 31G X 8 MM misc   Insulin Regular Human, Conc, (HumuLIN R U-500 KwikPen) 500 UNIT/ML solution pen-injector CONCENTRATED injection   lamoTRIgine (LaMICtal) 100 MG tablet   Lancets (freestyle) lancets   levothyroxine (SYNTHROID, LEVOTHROID) 88 MCG tablet   Methoxy PEG-Epoetin Beta (MIRCERA IJ)   mupirocin (BACTROBAN) 2 % ointment   ONDANSETRON PO   PEG 3350-KCl-NaBcb-NaCl-NaSulf " (PEG-3350/Electrolytes) 236 g reconstituted solution   rosuvastatin (CRESTOR) 20 MG tablet   Dulaglutide (Trulicity) 3 MG/0.5ML solution pen-injector (Discontinued)   Semaglutide, 2 MG/DOSE, (Ozempic, 2 MG/DOSE,) 8 MG/3ML solution pen-injector   Dulaglutide (Trulicity) 4.5 MG/0.5ML solution pen-injector (Discontinued)   Evolocumab (Repatha) solution prefilled syringe injection (Discontinued)       Allergies    Codeine Nausea Only, Nausea And Vomiting   N/v/felt hot

## 2022-06-09 LAB
BACTERIA UR QL AUTO: ABNORMAL /HPF
BILIRUB UR QL STRIP: NEGATIVE
CLARITY UR: ABNORMAL
COLOR UR: ABNORMAL
GLUCOSE UR STRIP-MCNC: ABNORMAL MG/DL
HGB UR QL STRIP.AUTO: ABNORMAL
HYALINE CASTS UR QL AUTO: ABNORMAL /LPF
KETONES UR QL STRIP: ABNORMAL
LEUKOCYTE ESTERASE UR QL STRIP.AUTO: ABNORMAL
NITRITE UR QL STRIP: NEGATIVE
PH UR STRIP.AUTO: <=5 [PH] (ref 5–8)
PROT UR QL STRIP: ABNORMAL
RBC # UR STRIP: ABNORMAL /HPF
REF LAB TEST METHOD: ABNORMAL
SP GR UR STRIP: 1.03 (ref 1–1.03)
SQUAMOUS #/AREA URNS HPF: ABNORMAL /HPF
UROBILINOGEN UR QL STRIP: ABNORMAL
WBC # UR STRIP: ABNORMAL /HPF
YEAST URNS QL MICRO: ABNORMAL /HPF

## 2022-06-10 LAB — BACTERIA SPEC AEROBE CULT: NORMAL

## 2022-06-13 ENCOUNTER — DOCUMENTATION (OUTPATIENT)
Dept: ENDOCRINOLOGY | Facility: CLINIC | Age: 62
End: 2022-06-13

## 2022-06-24 ENCOUNTER — TELEPHONE (OUTPATIENT)
Dept: ENDOCRINOLOGY | Facility: CLINIC | Age: 62
End: 2022-06-24

## 2022-06-24 RX ORDER — INSULIN HUMAN 500 [IU]/ML
INJECTION, SOLUTION SUBCUTANEOUS
Qty: 120 ML | Refills: 3 | Status: SHIPPED | OUTPATIENT
Start: 2022-06-24

## 2022-06-24 NOTE — TELEPHONE ENCOUNTER
Pt [facility] came in. She needs Humilin in vials sent to [facility]. The lady from [facility] told her she would need 12 vials for a 3 month supply.

## 2022-06-25 ENCOUNTER — APPOINTMENT (OUTPATIENT)
Dept: CT IMAGING | Age: 62
DRG: 690 | End: 2022-06-25
Payer: MEDICARE

## 2022-06-25 ENCOUNTER — HOSPITAL ENCOUNTER (INPATIENT)
Age: 62
LOS: 2 days | Discharge: HOME | DRG: 690 | End: 2022-06-27
Attending: FAMILY MEDICINE | Admitting: HOSPITALIST
Payer: MEDICARE

## 2022-06-25 DIAGNOSIS — R10.9 FLANK PAIN: ICD-10-CM

## 2022-06-25 DIAGNOSIS — N12 PYELONEPHRITIS: ICD-10-CM

## 2022-06-25 DIAGNOSIS — Z99.2 HEMODIALYSIS PATIENT (HCC): ICD-10-CM

## 2022-06-25 DIAGNOSIS — R11.2 NON-INTRACTABLE VOMITING WITH NAUSEA, UNSPECIFIED VOMITING TYPE: Primary | ICD-10-CM

## 2022-06-25 LAB
ADENOVIRUS F 40 41 PCR: NOT DETECTED
ALBUMIN SERPL-MCNC: 3.8 G/DL (ref 3.5–5.2)
ALP BLD-CCNC: 89 U/L (ref 35–104)
ALT SERPL-CCNC: 8 U/L (ref 5–33)
ANION GAP SERPL CALCULATED.3IONS-SCNC: 22 MMOL/L (ref 7–19)
AST SERPL-CCNC: 17 U/L (ref 5–32)
ASTROVIRUS PCR: NOT DETECTED
BACTERIA: ABNORMAL /HPF
BASOPHILS ABSOLUTE: 0 K/UL (ref 0–0.2)
BASOPHILS RELATIVE PERCENT: 0.2 % (ref 0–1)
BILIRUB SERPL-MCNC: 0.3 MG/DL (ref 0.2–1.2)
BILIRUBIN URINE: NEGATIVE
BLOOD, URINE: ABNORMAL
BUN BLDV-MCNC: 77 MG/DL (ref 8–23)
CALCIUM SERPL-MCNC: 9.6 MG/DL (ref 8.8–10.2)
CAMPYLOBACTER PCR: NOT DETECTED
CHLORIDE BLD-SCNC: 89 MMOL/L (ref 98–111)
CLARITY: ABNORMAL
CLOSTRIDIUM DIFFICILE, PCR: NOT DETECTED
CO2: 21 MMOL/L (ref 22–29)
COLOR: YELLOW
CREAT SERPL-MCNC: 8.5 MG/DL (ref 0.5–0.9)
CRYPTOSPORIDIUM PCR: NOT DETECTED
CRYSTALS, UA: ABNORMAL /HPF
CYCLOSPORA CAYETANENSIS PCR: NOT DETECTED
E COLI ENTEROAGGREGATIVE PCR: NOT DETECTED
E COLI ENTEROPATHOGENIC PCR: NOT DETECTED
E COLI ENTEROTOXIGENIC PCR: NOT DETECTED
E COLI SHIGELLA/ENTEROINVASIVE PCR: NOT DETECTED
ENTAMOEBA HISTOLYTICA PCR: NOT DETECTED
EOSINOPHILS ABSOLUTE: 0.1 K/UL (ref 0–0.6)
EOSINOPHILS RELATIVE PERCENT: 0.8 % (ref 0–5)
EPITHELIAL CELLS, UA: 15 /HPF (ref 0–5)
GFR AFRICAN AMERICAN: 6
GFR NON-AFRICAN AMERICAN: 5
GIARDIA LAMBLIA PCR: NOT DETECTED
GLUCOSE BLD-MCNC: 252 MG/DL (ref 74–109)
GLUCOSE BLD-MCNC: 260 MG/DL (ref 70–99)
GLUCOSE URINE: 250 MG/DL
HCT VFR BLD CALC: 27.3 % (ref 37–47)
HEMOGLOBIN: 8.9 G/DL (ref 12–16)
HYALINE CASTS: 13 /HPF (ref 0–8)
IMMATURE GRANULOCYTES #: 0.2 K/UL
KETONES, URINE: ABNORMAL MG/DL
LEUKOCYTE ESTERASE, URINE: ABNORMAL
LIPASE: 39 U/L (ref 13–60)
LYMPHOCYTES ABSOLUTE: 0.9 K/UL (ref 1.1–4.5)
LYMPHOCYTES RELATIVE PERCENT: 5.8 % (ref 20–40)
MCH RBC QN AUTO: 34.5 PG (ref 27–31)
MCHC RBC AUTO-ENTMCNC: 32.6 G/DL (ref 33–37)
MCV RBC AUTO: 105.8 FL (ref 81–99)
MONOCYTES ABSOLUTE: 0.6 K/UL (ref 0–0.9)
MONOCYTES RELATIVE PERCENT: 3.6 % (ref 0–10)
NEUTROPHILS ABSOLUTE: 14.3 K/UL (ref 1.5–7.5)
NEUTROPHILS RELATIVE PERCENT: 88.4 % (ref 50–65)
NITRITE, URINE: NEGATIVE
NOROVIRUS GI GII PCR: NOT DETECTED
PDW BLD-RTO: 17.2 % (ref 11.5–14.5)
PERFORMED ON: ABNORMAL
PH UA: 5 (ref 5–8)
PLATELET # BLD: 253 K/UL (ref 130–400)
PLESIOMONAS SHIGELLOIDES PCR: NOT DETECTED
PMV BLD AUTO: 9.9 FL (ref 9.4–12.3)
POTASSIUM SERPL-SCNC: 3.8 MMOL/L (ref 3.5–5)
PROTEIN UA: 100 MG/DL
RBC # BLD: 2.58 M/UL (ref 4.2–5.4)
RBC UA: 11 /HPF (ref 0–4)
ROTAVIRUS A PCR: NOT DETECTED
SALMONELLA PCR: NOT DETECTED
SAPOVIRUS PCR: NOT DETECTED
SARS-COV-2, NAAT: NOT DETECTED
SHIGA-LIKE TOXIN-PRODUCING E. COLI (STEC) STX1/STX2: NOT DETECTED
SODIUM BLD-SCNC: 132 MMOL/L (ref 136–145)
SPECIFIC GRAVITY UA: 1.02 (ref 1–1.03)
TOTAL PROTEIN: 7.7 G/DL (ref 6.6–8.7)
UROBILINOGEN, URINE: 1 E.U./DL
VIBRIO CHOLERAE PCR: NOT DETECTED
VIBRIO PCR: NOT DETECTED
WBC # BLD: 16.2 K/UL (ref 4.8–10.8)
WBC UA: 34 /HPF (ref 0–5)
YERSINIA ENTEROCOLITICA PCR: NOT DETECTED

## 2022-06-25 PROCEDURE — 1210000000 HC MED SURG R&B

## 2022-06-25 PROCEDURE — 85025 COMPLETE CBC W/AUTO DIFF WBC: CPT

## 2022-06-25 PROCEDURE — 87635 SARS-COV-2 COVID-19 AMP PRB: CPT

## 2022-06-25 PROCEDURE — 80053 COMPREHEN METABOLIC PANEL: CPT

## 2022-06-25 PROCEDURE — 36415 COLL VENOUS BLD VENIPUNCTURE: CPT

## 2022-06-25 PROCEDURE — 2580000003 HC RX 258: Performed by: FAMILY MEDICINE

## 2022-06-25 PROCEDURE — 87086 URINE CULTURE/COLONY COUNT: CPT

## 2022-06-25 PROCEDURE — 6370000000 HC RX 637 (ALT 250 FOR IP): Performed by: NURSE PRACTITIONER

## 2022-06-25 PROCEDURE — 96374 THER/PROPH/DIAG INJ IV PUSH: CPT

## 2022-06-25 PROCEDURE — 83036 HEMOGLOBIN GLYCOSYLATED A1C: CPT

## 2022-06-25 PROCEDURE — 74176 CT ABD & PELVIS W/O CONTRAST: CPT | Performed by: RADIOLOGY

## 2022-06-25 PROCEDURE — 84443 ASSAY THYROID STIM HORMONE: CPT

## 2022-06-25 PROCEDURE — 82947 ASSAY GLUCOSE BLOOD QUANT: CPT

## 2022-06-25 PROCEDURE — 6360000002 HC RX W HCPCS: Performed by: NURSE PRACTITIONER

## 2022-06-25 PROCEDURE — 83690 ASSAY OF LIPASE: CPT

## 2022-06-25 PROCEDURE — 96375 TX/PRO/DX INJ NEW DRUG ADDON: CPT

## 2022-06-25 PROCEDURE — 99285 EMERGENCY DEPT VISIT HI MDM: CPT

## 2022-06-25 PROCEDURE — 87507 IADNA-DNA/RNA PROBE TQ 12-25: CPT

## 2022-06-25 PROCEDURE — 81001 URINALYSIS AUTO W/SCOPE: CPT

## 2022-06-25 PROCEDURE — 6360000002 HC RX W HCPCS: Performed by: FAMILY MEDICINE

## 2022-06-25 PROCEDURE — 74176 CT ABD & PELVIS W/O CONTRAST: CPT

## 2022-06-25 RX ORDER — CARVEDILOL 6.25 MG/1
6.25 TABLET ORAL 2 TIMES DAILY WITH MEALS
COMMUNITY

## 2022-06-25 RX ORDER — ASPIRIN 81 MG/1
81 TABLET ORAL DAILY
Status: DISCONTINUED | OUTPATIENT
Start: 2022-06-25 | End: 2022-06-27 | Stop reason: HOSPADM

## 2022-06-25 RX ORDER — HEPARIN SODIUM 5000 [USP'U]/ML
5000 INJECTION, SOLUTION INTRAVENOUS; SUBCUTANEOUS EVERY 8 HOURS SCHEDULED
Status: DISCONTINUED | OUTPATIENT
Start: 2022-06-25 | End: 2022-06-27 | Stop reason: HOSPADM

## 2022-06-25 RX ORDER — HYDROMORPHONE HYDROCHLORIDE 1 MG/ML
0.5 INJECTION, SOLUTION INTRAMUSCULAR; INTRAVENOUS; SUBCUTANEOUS ONCE
Status: COMPLETED | OUTPATIENT
Start: 2022-06-25 | End: 2022-06-25

## 2022-06-25 RX ORDER — DEXTROSE MONOHYDRATE 50 MG/ML
100 INJECTION, SOLUTION INTRAVENOUS PRN
Status: DISCONTINUED | OUTPATIENT
Start: 2022-06-25 | End: 2022-06-27 | Stop reason: HOSPADM

## 2022-06-25 RX ORDER — SODIUM CHLORIDE 9 MG/ML
INJECTION, SOLUTION INTRAVENOUS PRN
Status: DISCONTINUED | OUTPATIENT
Start: 2022-06-25 | End: 2022-06-27 | Stop reason: HOSPADM

## 2022-06-25 RX ORDER — INSULIN LISPRO 100 [IU]/ML
0-6 INJECTION, SOLUTION INTRAVENOUS; SUBCUTANEOUS
Status: DISCONTINUED | OUTPATIENT
Start: 2022-06-25 | End: 2022-06-25

## 2022-06-25 RX ORDER — METOCLOPRAMIDE HYDROCHLORIDE 5 MG/ML
10 INJECTION INTRAMUSCULAR; INTRAVENOUS ONCE
Status: COMPLETED | OUTPATIENT
Start: 2022-06-25 | End: 2022-06-25

## 2022-06-25 RX ORDER — LEVOFLOXACIN 5 MG/ML
750 INJECTION, SOLUTION INTRAVENOUS ONCE
Status: COMPLETED | OUTPATIENT
Start: 2022-06-25 | End: 2022-06-26

## 2022-06-25 RX ORDER — CALCITRIOL 0.25 UG/1
0.5 CAPSULE, LIQUID FILLED ORAL DAILY
Status: DISCONTINUED | OUTPATIENT
Start: 2022-06-26 | End: 2022-06-27 | Stop reason: HOSPADM

## 2022-06-25 RX ORDER — INSULIN LISPRO 100 [IU]/ML
0-3 INJECTION, SOLUTION INTRAVENOUS; SUBCUTANEOUS NIGHTLY
Status: DISCONTINUED | OUTPATIENT
Start: 2022-06-25 | End: 2022-06-25

## 2022-06-25 RX ORDER — CITALOPRAM 20 MG/1
40 TABLET ORAL DAILY
Status: DISCONTINUED | OUTPATIENT
Start: 2022-06-25 | End: 2022-06-27 | Stop reason: HOSPADM

## 2022-06-25 RX ORDER — LEVOFLOXACIN 5 MG/ML
500 INJECTION, SOLUTION INTRAVENOUS
Status: DISCONTINUED | OUTPATIENT
Start: 2022-06-27 | End: 2022-06-27 | Stop reason: HOSPADM

## 2022-06-25 RX ORDER — ASCORBIC ACID 500 MG
500 TABLET ORAL DAILY
Status: DISCONTINUED | OUTPATIENT
Start: 2022-06-26 | End: 2022-06-27 | Stop reason: HOSPADM

## 2022-06-25 RX ORDER — INSULIN GLARGINE 100 [IU]/ML
70 INJECTION, SOLUTION SUBCUTANEOUS DAILY
Status: DISCONTINUED | OUTPATIENT
Start: 2022-06-25 | End: 2022-06-25

## 2022-06-25 RX ORDER — CARVEDILOL 6.25 MG/1
6.25 TABLET ORAL 2 TIMES DAILY WITH MEALS
Status: DISCONTINUED | OUTPATIENT
Start: 2022-06-25 | End: 2022-06-27 | Stop reason: HOSPADM

## 2022-06-25 RX ORDER — MORPHINE SULFATE 4 MG/ML
4 INJECTION, SOLUTION INTRAMUSCULAR; INTRAVENOUS ONCE
Status: COMPLETED | OUTPATIENT
Start: 2022-06-25 | End: 2022-06-25

## 2022-06-25 RX ORDER — ALLOPURINOL 100 MG/1
100 TABLET ORAL 2 TIMES DAILY
Status: DISCONTINUED | OUTPATIENT
Start: 2022-06-25 | End: 2022-06-27 | Stop reason: HOSPADM

## 2022-06-25 RX ORDER — DOCUSATE SODIUM 100 MG/1
200 CAPSULE, LIQUID FILLED ORAL 4 TIMES DAILY
COMMUNITY
End: 2022-10-21

## 2022-06-25 RX ORDER — ROSUVASTATIN CALCIUM 20 MG/1
20 TABLET, COATED ORAL NIGHTLY
Status: DISCONTINUED | OUTPATIENT
Start: 2022-06-25 | End: 2022-06-27 | Stop reason: HOSPADM

## 2022-06-25 RX ORDER — CHOLECALCIFEROL (VITAMIN D3) 10 MCG
1 TABLET ORAL DAILY
Status: DISCONTINUED | OUTPATIENT
Start: 2022-06-25 | End: 2022-06-27 | Stop reason: HOSPADM

## 2022-06-25 RX ORDER — FLUTICASONE PROPIONATE 50 MCG
2 SPRAY, SUSPENSION (ML) NASAL DAILY PRN
Status: DISCONTINUED | OUTPATIENT
Start: 2022-06-25 | End: 2022-06-27 | Stop reason: HOSPADM

## 2022-06-25 RX ORDER — ONDANSETRON 2 MG/ML
4 INJECTION INTRAMUSCULAR; INTRAVENOUS EVERY 6 HOURS PRN
Status: DISCONTINUED | OUTPATIENT
Start: 2022-06-25 | End: 2022-06-27 | Stop reason: HOSPADM

## 2022-06-25 RX ORDER — INSULIN LISPRO 100 [IU]/ML
0-6 INJECTION, SOLUTION INTRAVENOUS; SUBCUTANEOUS NIGHTLY
Status: DISCONTINUED | OUTPATIENT
Start: 2022-06-25 | End: 2022-06-27 | Stop reason: HOSPADM

## 2022-06-25 RX ORDER — ONDANSETRON 4 MG/1
4 TABLET, ORALLY DISINTEGRATING ORAL EVERY 8 HOURS PRN
Status: DISCONTINUED | OUTPATIENT
Start: 2022-06-25 | End: 2022-06-27 | Stop reason: HOSPADM

## 2022-06-25 RX ORDER — LEVOTHYROXINE SODIUM 88 UG/1
88 TABLET ORAL DAILY
Status: DISCONTINUED | OUTPATIENT
Start: 2022-06-25 | End: 2022-06-27 | Stop reason: HOSPADM

## 2022-06-25 RX ORDER — SODIUM CHLORIDE 0.9 % (FLUSH) 0.9 %
5-40 SYRINGE (ML) INJECTION PRN
Status: DISCONTINUED | OUTPATIENT
Start: 2022-06-25 | End: 2022-06-27 | Stop reason: HOSPADM

## 2022-06-25 RX ORDER — HYDROMORPHONE HYDROCHLORIDE 1 MG/ML
0.5 INJECTION, SOLUTION INTRAMUSCULAR; INTRAVENOUS; SUBCUTANEOUS EVERY 4 HOURS PRN
Status: DISCONTINUED | OUTPATIENT
Start: 2022-06-25 | End: 2022-06-27 | Stop reason: HOSPADM

## 2022-06-25 RX ORDER — ONDANSETRON 2 MG/ML
4 INJECTION INTRAMUSCULAR; INTRAVENOUS ONCE
Status: COMPLETED | OUTPATIENT
Start: 2022-06-25 | End: 2022-06-25

## 2022-06-25 RX ORDER — POLYETHYLENE GLYCOL 3350 17 G/17G
17 POWDER, FOR SOLUTION ORAL DAILY PRN
Status: DISCONTINUED | OUTPATIENT
Start: 2022-06-25 | End: 2022-06-27 | Stop reason: HOSPADM

## 2022-06-25 RX ORDER — SODIUM CHLORIDE 0.9 % (FLUSH) 0.9 %
5-40 SYRINGE (ML) INJECTION EVERY 12 HOURS SCHEDULED
Status: DISCONTINUED | OUTPATIENT
Start: 2022-06-25 | End: 2022-06-27 | Stop reason: HOSPADM

## 2022-06-25 RX ORDER — ACETAMINOPHEN 650 MG/1
650 SUPPOSITORY RECTAL EVERY 6 HOURS PRN
Status: DISCONTINUED | OUTPATIENT
Start: 2022-06-25 | End: 2022-06-27 | Stop reason: HOSPADM

## 2022-06-25 RX ORDER — SODIUM CHLORIDE 9 MG/ML
1000 INJECTION, SOLUTION INTRAVENOUS CONTINUOUS
Status: DISCONTINUED | OUTPATIENT
Start: 2022-06-25 | End: 2022-06-27 | Stop reason: HOSPADM

## 2022-06-25 RX ORDER — CHLORAL HYDRATE 500 MG
1000 CAPSULE ORAL
Status: DISCONTINUED | OUTPATIENT
Start: 2022-06-25 | End: 2022-06-25

## 2022-06-25 RX ORDER — ACETAMINOPHEN 325 MG/1
650 TABLET ORAL EVERY 6 HOURS PRN
Status: DISCONTINUED | OUTPATIENT
Start: 2022-06-25 | End: 2022-06-27 | Stop reason: HOSPADM

## 2022-06-25 RX ORDER — DOCUSATE SODIUM 100 MG/1
200 CAPSULE, LIQUID FILLED ORAL 3 TIMES DAILY
Status: DISCONTINUED | OUTPATIENT
Start: 2022-06-25 | End: 2022-06-27 | Stop reason: HOSPADM

## 2022-06-25 RX ORDER — INSULIN LISPRO 100 [IU]/ML
0-12 INJECTION, SOLUTION INTRAVENOUS; SUBCUTANEOUS
Status: DISCONTINUED | OUTPATIENT
Start: 2022-06-25 | End: 2022-06-27 | Stop reason: HOSPADM

## 2022-06-25 RX ORDER — LAMOTRIGINE 25 MG/1
100 TABLET ORAL DAILY
Status: DISCONTINUED | OUTPATIENT
Start: 2022-06-25 | End: 2022-06-25

## 2022-06-25 RX ORDER — BUSPIRONE HYDROCHLORIDE 10 MG/1
20 TABLET ORAL 2 TIMES DAILY
Status: DISCONTINUED | OUTPATIENT
Start: 2022-06-25 | End: 2022-06-27 | Stop reason: HOSPADM

## 2022-06-25 RX ORDER — CALCITRIOL 0.5 UG/1
1.5 CAPSULE, LIQUID FILLED ORAL DAILY
COMMUNITY

## 2022-06-25 RX ORDER — POTASSIUM CHLORIDE 1.5 G/1.77G
20 POWDER, FOR SOLUTION ORAL DAILY
COMMUNITY

## 2022-06-25 RX ORDER — ASCORBIC ACID 500 MG
500 TABLET ORAL 2 TIMES DAILY
COMMUNITY

## 2022-06-25 RX ADMIN — INSULIN LISPRO 3 UNITS: 100 INJECTION, SOLUTION INTRAVENOUS; SUBCUTANEOUS at 22:49

## 2022-06-25 RX ADMIN — MORPHINE SULFATE 4 MG: 4 INJECTION, SOLUTION INTRAMUSCULAR; INTRAVENOUS at 15:16

## 2022-06-25 RX ADMIN — HYDROMORPHONE HYDROCHLORIDE 0.5 MG: 1 INJECTION, SOLUTION INTRAMUSCULAR; INTRAVENOUS; SUBCUTANEOUS at 16:10

## 2022-06-25 RX ADMIN — CEFTRIAXONE SODIUM 1000 MG: 1 INJECTION, POWDER, FOR SOLUTION INTRAMUSCULAR; INTRAVENOUS at 17:00

## 2022-06-25 RX ADMIN — ALLOPURINOL 100 MG: 100 TABLET ORAL at 22:47

## 2022-06-25 RX ADMIN — ONDANSETRON HYDROCHLORIDE 4 MG: 2 SOLUTION INTRAMUSCULAR; INTRAVENOUS at 15:16

## 2022-06-25 RX ADMIN — ROSUVASTATIN 20 MG: 20 TABLET, FILM COATED ORAL at 22:47

## 2022-06-25 RX ADMIN — CARVEDILOL 6.25 MG: 6.25 TABLET, FILM COATED ORAL at 22:50

## 2022-06-25 RX ADMIN — METOCLOPRAMIDE 10 MG: 5 INJECTION, SOLUTION INTRAMUSCULAR; INTRAVENOUS at 16:10

## 2022-06-25 RX ADMIN — BUSPIRONE HYDROCHLORIDE 20 MG: 10 TABLET ORAL at 22:47

## 2022-06-25 RX ADMIN — SODIUM CHLORIDE 1000 ML: 9 INJECTION, SOLUTION INTRAVENOUS at 15:17

## 2022-06-25 RX ADMIN — LEVOFLOXACIN 750 MG: 5 INJECTION, SOLUTION INTRAVENOUS at 22:46

## 2022-06-25 ASSESSMENT — ENCOUNTER SYMPTOMS
VOMITING: 1
BACK PAIN: 0
DIARRHEA: 0
ABDOMINAL DISTENTION: 0
APNEA: 0
ABDOMINAL PAIN: 0
TROUBLE SWALLOWING: 0
CHEST TIGHTNESS: 0
COUGH: 0
ABDOMINAL PAIN: 1
SORE THROAT: 0
NAUSEA: 1
SHORTNESS OF BREATH: 0
WHEEZING: 0
CONSTIPATION: 0

## 2022-06-25 ASSESSMENT — PAIN DESCRIPTION - LOCATION: LOCATION: FLANK

## 2022-06-25 ASSESSMENT — PAIN SCALES - GENERAL: PAINLEVEL_OUTOF10: 6

## 2022-06-25 NOTE — ED NOTES
Pt sleeping and O2 saturation 80%. Pt states she wears a bipap at night for sleep apnea.  Pt placed on 2L NC while resting     Iris Thomas RN  06/25/22 6150

## 2022-06-25 NOTE — PROGRESS NOTES
Pharmacy Consult      Yadira Noyola is a 58 y.o. female for whom pharmacy has been consulted to dose Levaquin. Patient Active Problem List   Diagnosis    Morbid obesity ([address]    Thyroid disease    Sleep apnea    Mood disorder ([address]    Hypertension associated with diabetes ([address]    Anemia of chronic renal failure    Type 2 DM with CKD stage 3 and hypertension (HCC)    Hypertriglyceridemia    Anxiety    Embolism (HCC)    Vitamin D deficiency    Anemia    Hypocalcemia    Allergic rhinitis with postnasal drip    B12 deficiency    Deep vein thrombosis of lower extremity (HCC)    Disease of liver    Mixed diabetic hyperlipidemia associated with type 2 diabetes mellitus ([address]    ESRD on dialysis ([address]    [facility] encounter [facility]    Acute hypoxemic respiratory failure ([facility]    Diabetes ([facility]    Hypertension    Hypoxia    Pyelonephritis       Allergies:  Codeine     Recent Labs     06/25/22  1423   CREATININE 8.5*       Ht/Wt:   Ht Readings from Last 1 Encounters:   06/25/22 5' 3\" (1.6 m)        Wt Readings from Last 1 Encounters:   06/25/22 (!) 324 lb (147 kg)         Estimated Creatinine Clearance: 10 mL/min (A) (based on SCr of 8.5 mg/dL (H)). Assessment/Plan:  Patient is a HD patient. Will give Levaquin 750 mg IV once then 500 mg IV q48h. *    Thank you for the consult.           Electronically signed by Laverne Wise RPh, ELIZA, 6/25/2022,6:51 PM

## 2022-06-25 NOTE — ED PROVIDER NOTES
[facility] EMERGENCY DEPT  eMERGENCY dEPARTMENT eNCOUnter      Pt Name: Enrike Diaz  MRN: [id number]  [place] 1960  Date of evaluation: 6/25/2022  Provider: Giorgio Corrales MD    [address] [place] [address]       Chief Complaint   Patient presents with    Emesis    Flank Pain     right side on and off x2-3 weeks         HISTORY OF PRESENT ILLNESS   (Location/Symptom, Timing/Onset,Context/Setting, Quality, Duration, Modifying Factors, Severity)  Note limiting factors. Enrike Diaz is a 58 y.o. female who presents to the emergency department . Patient is a 70-year-old female with a history of home hemodialysis. This started 2 months ago. Patient now complains of right flank pain and vomiting which started around 10 AM.  Patient states this radiates to her right lower abdomen. HPI    NursingNotes were reviewed. REVIEW OF SYSTEMS    (2-9 systems for level 4, 10 or more for level 5)     Review of Systems   Constitutional: Negative for diaphoresis and fever. HENT: Negative for sore throat and trouble swallowing. Eyes: Negative for visual disturbance. Respiratory: Negative for apnea, cough, chest tightness, shortness of breath and wheezing. Cardiovascular: Negative for chest pain, palpitations and leg swelling. Gastrointestinal: Positive for nausea and vomiting. Negative for abdominal distention, abdominal pain, constipation and diarrhea. Genitourinary: Positive for flank pain. Musculoskeletal: Negative for back pain and myalgias. Skin: Negative for pallor. Neurological: Negative for dizziness, weakness, light-headedness and headaches. Psychiatric/Behavioral: Negative for behavioral problems and suicidal ideas. All other systems reviewed and are negative.            PAST MEDICALHISTORY     Past Medical History:   Diagnosis Date    Anxiety     Arthritis     CKD (chronic kidney disease)     stage 4    Colon polyp     Depression     Embolism - blood clot 2004    Hemodialysis patient ([facility] dialysis mon - friday x 2 weeks.  Hx of blood clots     Hyperlipidemia     Hypertension     Obesity     Sleep apnea     Thyroid disease     Type II or unspecified type diabetes mellitus without mention of complication, not stated as uncontrolled          SURGICAL HISTORY       Past Surgical History:   Procedure Laterality Date    CHOLECYSTECTOMY      DIALYSIS CATHETER INSERTION N/A 8/11/2021    INSERTION CATHETER DIALYSIS performed by Milton Coy MD at [facility] Left 06/18/2021    LEFT BRACHIAL-CEPHALIC AV FISTULA CREATION performed by Milton Coy MD at [facility] Left 7/27/2021    LEFT UPPER EXTREMITY CEPHALIC VEIN SUPERFICIALIZATION performed by Milton Coy MD at [address] (CERVIX STATUS UNKNOWN)  10/17/2018    POLYPECTOMY      RECTAL SURGERY      fistula repair    TONSILLECTOMY AND ADENOIDECTOMY      VASCULAR SURGERY  07/12/2021    [facility]- Ultrasound-guided cannulation left distal upper arm cephalic vein with 4 French glide sheath, Left upper extremity fistulogram's including venography the superior vena cava    VASCULAR SURGERY  07/27/2021    [facility]- Superficialization of the left upper arm cephalic vein arteriovenous fistula         CURRENT MEDICATIONS     Previous Medications    ALLOPURINOL (ZYLOPRIM) 100 MG TABLET    Take 100 mg by mouth 2 times daily    ASPIRIN 81 MG TABLET    Take 81 mg by mouth daily With Dinner    B COMPLEX-C-FOLIC ACID (NEPHROCAPS) 1 MG CAPSULE    Take 1 capsule by mouth daily    BUSPIRONE (BUSPAR) 10 MG TABLET    Take 20 mg by mouth 2 times daily    CETIRIZINE (ZYRTEC) 10 MG TABLET    Take 10 mg by mouth as needed for Allergies     CITALOPRAM (CELEXA) 40 MG TABLET    Take 40 mg by mouth daily    DIAZEPAM (VALIUM) 2 MG TABLET    Take 2 mg by mouth as needed for Anxiety.      EPOETIN WEI (EPOGEN;PROCRIT) 85942 UNIT/ML INJECTION    Inject 10,000 Units into the skin every 30 days     FERRIC CITRATE (AURYXIA) 1  MG(FE) TABS    Take 2 tablets by mouth    FLUTICASONE (FLONASE) 50 MCG/ACT NASAL SPRAY    2 sprays by Nasal route daily as needed for Rhinitis     FREESTYLE LANCETS MISC    Use as instructed 4 x daily    INSULIN GLARGINE (LANTUS SC)    Inject 70 Units into the skin daily With Evening Meals    INSULIN LISPRO (HUMALOG KWIKPEN) 200 UNIT/ML SOPN PEN    Inject 25-80 Units into the skin 3 times daily (with meals)     INSULIN PEN NEEDLE (B-D ULTRAFINE III SHORT PEN) 31G X 8 MM MISC    USE AS DIRECTED TO INJECT FOUR TIMES DAILY    INSULIN SYRINGE-NEEDLE U-100 (BD INSULIN SYRINGE U/F) 31G X 5/16\" 1 ML MISC    AS DIRECTED FOUR TIMES A DAY    LAMOTRIGINE (LAMICTAL) 100 MG TABLET    Take 100 mg by mouth daily Indications: 1/2 tablet with dinner    LEVOTHYROXINE (SYNTHROID) 88 MCG TABLET    Take 88 mcg by mouth Daily     OMEGA-3 FATTY ACIDS (FISH OIL) 1000 MG CAPS    Take 1,000 mg by mouth Daily with supper     ROSUVASTATIN (CRESTOR) 20 MG TABLET    Take 20 mg by mouth daily     TRULICITY 3 QM/0.3AQ SOPN    3 mg once a week        ALLERGIES     Codeine    FAMILY HISTORY       Family History   Problem Relation Age of Onset    Lung Cancer Mother     Brain Cancer Mother     Heart Attack Father     Prostate Cancer Father     Heart Disease Father     Stroke Father     Uterine Cancer Maternal Grandmother     Cervical Cancer Maternal Grandmother     Diabetes Maternal Grandfather     Other Maternal Grandfather         histoplasmosis    Diabetes Paternal Grandfather           SOCIAL HISTORY       Social History     Socioeconomic History    Marital status:      Spouse name: None    Number of children: None    Years of education: None    Highest education level: None   Occupational History    None   Tobacco Use    Smoking status: Never Smoker    Smokeless tobacco: Never Used   Vaping Use    Vaping Use: Never used   Substance and Sexual Activity    Alcohol use: No    Drug use: No    Sexual activity: Never   Other Topics Concern    None   Social History Narrative    None     Social Determinants of Health     Financial Resource Strain:     Difficulty of Paying Living Expenses: Not on file   Food Insecurity:     Worried About Running Out of Food in the Last Year: Not on file    Jocelyn of Food in the Last Year: Not on file   Transportation Needs:     Lack of Transportation (Medical): Not on file    Lack of Transportation (Non-Medical): Not on file   Physical Activity:     Days of Exercise per Week: Not on file    Minutes of Exercise per Session: Not on file   Stress:     Feeling of Stress : Not on file   Social Connections:     Frequency of Communication with Friends and Family: Not on file    Frequency of Social Gatherings with Friends and Family: Not on file    Attends Scientology Services: Not on file    Active Member of [address] [place] [address] [employer] or Organizations: Not on file    Attends Club or Organization Meetings: Not on file    Marital Status: Not on file   Intimate Partner Violence:     Fear of Current or Ex-Partner: Not on file    Emotionally Abused: Not on file    Physically Abused: Not on file    Sexually Abused: Not on file   Housing Stability:     Unable to Pay for Housing in the Last Year: Not on file    Number of [place] in the Last Year: Not on file    Unstable Housing in the Last Year: Not on file       SCREENINGS    [place] Coma Scale  Eye Opening: Spontaneous  Best Verbal Response: Oriented  Best Motor Response: Obeys commands  [place]rie Coma Scale Score: 15        PHYSICAL EXAM    (up to 7 for level 4, 8 or more for level 5)     ED Triage Vitals [06/25/22 1238]   BP Temp Temp src Heart Rate Resp SpO2 Height Weight   (!) 151/69 98.3 °F (36.8 °C) -- 83 17 97 % -- --       Physical Exam  Vitals and nursing note reviewed. Constitutional:       Appearance: She is well-developed. HENT:      Head: Normocephalic and atraumatic.    Eyes: Conjunctiva/sclera: Conjunctivae normal.      Pupils: Pupils are equal, round, and reactive to light. Neck:      Trachea: No tracheal deviation. Cardiovascular:      Rate and Rhythm: Normal rate and regular rhythm. Heart sounds: Normal heart sounds. Pulmonary:      Effort: Pulmonary effort is normal. No respiratory distress. Breath sounds: Normal breath sounds. No wheezing or rales. Abdominal:      General: Bowel sounds are normal.      Palpations: Abdomen is soft. Tenderness: There is abdominal tenderness. Comments: Right flank tenderness   Musculoskeletal:         General: Normal range of motion. Cervical back: Normal range of motion and neck supple. Skin:     General: Skin is warm and dry. Neurological:      Mental Status: She is alert and oriented to person, place, and time. Psychiatric:         Behavior: Behavior normal.         Thought Content: Thought content normal.         DIAGNOSTIC RESULTS     EKG: All EKG's areinterpreted by the Emergency Department Physician who either signs or Co-signs this chart in the absence of a cardiologist.    None    RADIOLOGY:  Non-plain film images such as CT, Ultrasound and MRI are read by the radiologist. Plain radiographic images are visualized and preliminarily interpreted bythe emergency physician with the below findings:    See below      CT ABDOMEN PELVIS WO CONTRAST Additional Contrast? None   Final Result   1. Cortical thinning of the kidneys with perinephric inflammation. 2.  Inflammation of the mesentery throughout the abdomen and pelvis. 3.  Status post cholecystectomy and status post hysterectomy. 4.  IVC filter. Recommendation: Follow up as clinically indicated. All CT scans at this facility utilize dose modulation, iterative reconstruction, and/or weight based dosing when appropriate to reduce radiation dose to as low as reasonably achievable.               LABS:  Labs Reviewed   URINALYSIS WITH REFLEX TO CULTURE - Abnormal; Notable for the following components:       Result Value    Clarity, UA CLOUDY (*)     Glucose, Ur 250 (*)     Ketones, Urine TRACE (*)     Blood, Urine MODERATE (*)     Protein,  (*)     Leukocyte Esterase, Urine SMALL (*)     All other components within normal limits   CBC WITH AUTO DIFFERENTIAL - Abnormal; Notable for the following components:    WBC 16.2 (*)     RBC 2.58 (*)     Hemoglobin 8.9 (*)     Hematocrit 27.3 (*)     .8 (*)     MCH 34.5 (*)     MCHC 32.6 (*)     RDW 17.2 (*)     Neutrophils % 88.4 (*)     Lymphocytes % 5.8 (*)     Neutrophils Absolute 14.3 (*)     Lymphocytes Absolute 0.9 (*)     All other components within normal limits   COMPREHENSIVE METABOLIC PANEL - Abnormal; Notable for the following components:    Sodium 132 (*)     Chloride 89 (*)     CO2 21 (*)     Anion Gap 22 (*)     Glucose 252 (*)     BUN 77 (*)     CREATININE 8.5 (*)     GFR Non- 5 (*)     GFR  6 (*)     All other components within normal limits   MICROSCOPIC URINALYSIS - Abnormal; Notable for the following components:    Bacteria, UA TRACE (*)     Crystals, UA NEG (*)     Hyaline Casts, UA 13 (*)     WBC, UA 34 (*)     RBC, UA 11 (*)     All other components within normal limits   GASTROINTESTINAL PANEL, MOLECULAR   CULTURE, URINE   LIPASE       All other labs were within normal range or not returned as of this dictation.     EMERGENCY DEPARTMENT COURSE and DIFFERENTIAL DIAGNOSIS/MDM:   Vitals:    Vitals:    06/25/22 1238 06/25/22 1515 06/25/22 1600 06/25/22 1700   BP: (!) 151/69 (!) 156/64 (!) 140/89 (!) 123/45   Pulse: 83 86 90 84   Resp: 17 18 18 18   Temp: 98.3 °F (36.8 °C)      SpO2: 97% 98% 92% 100%       MDM  Number of Diagnoses or Management Options     Amount and/or Complexity of Data Reviewed  Clinical lab tests: ordered and reviewed  Tests in the radiology section of CPT®: ordered and reviewed  Decide to obtain previous medical records or to obtain history from someone other than the patient: yes    Risk of Complications, Morbidity, and/or Mortality  Presenting problems: moderate  Diagnostic procedures: moderate  Management options: moderate    Patient Progress  Patient progress: stable      Reassessment  Patient stable throughout ED stay. Pain is improved but not resolved. White count is elevated at 16. Patient will be admitted to hospitalist with nephrology consultation. She received IV Rocephin. CONSULTS:  IP CONSULT TO NEPHROLOGY    PROCEDURES:  Unless otherwise noted below, none     Procedures    FINAL IMPRESSION      1. Non-intractable vomiting with nausea, unspecified vomiting type    2. Pyelonephritis    3. Hemodialysis patient ([facility]    4. Flank pain          DISPOSITION/PLAN   DISPOSITION admission      PATIENT REFERRED TO:  No follow-up provider specified.     DISCHARGE MEDICATIONS:  New Prescriptions    No medications on file          (Please note that portions of this note were completed with a voice recognition program.  Efforts were made to edit thedictations but occasionally words are mis-transcribed.)    Selam Watts MD (electronically signed)  Attending Emergency Physician         Selam Watts MD  06/25/22 7025

## 2022-06-25 NOTE — H&P
[id number] [facility]      Hospitalist - History & Physical      PCP: Joanna Siegel MD    Date of Admission: 6/25/2022    Date of Service: 6/25/2022    Chief Complaint:  Vomiting and flank pain    History Of Present Illness: The patient is a 58 y.o. female who presented to [address] [place] [address] ED complaining of vomiting and flank pain. Pt has history of depression, HTN, diabetes, hyperlipidemia, hypothyroidism, obesity, ESRD on home hemodialysis. Pt developed right sided flank pain associated with nausea and vomiting today. She had subjective fever at home earlier. She has had no diarrhea. She denies recent illness. She performs hemodialysis at home 5 days a week as recommended. In ED, pt with oxygen saturation of 80% requiring 2L of oxygen per nasal prongs. Wbc 16k, hgb 8.9, platelets 573J, ua appears contaminated with 15 epi cells, trace bacteria, 34 wbc, lipase 39k, sodium 132, potassium 3.8, CO2 21, creatinine 8.5/bun 77, LFT normal, covid test negative    CT abd/pelvis-cortical thinning of the kidneys with perinephric inflammation. Inflammation of the mesentery throughout the abdomen and pelvis. GI molecular panel negative. Pt is admitted to hospitalist service for further evaluation and treatment. Past Medical History:        Diagnosis Date    Anxiety     Arthritis     CKD (chronic kidney disease)     stage 4    Colon polyp     Depression     Embolism - blood clot 2004    Hemodialysis patient ([facility] [address]     dialysis mon - friday x 2 weeks.     Hx of blood clots     Hyperlipidemia     Hypertension     Obesity     Sleep apnea     Thyroid disease     Type II or unspecified type diabetes mellitus without mention of complication, not stated as uncontrolled        Past Surgical History:        Procedure Laterality Date    CHOLECYSTECTOMY      DIALYSIS CATHETER INSERTION N/A 8/11/2021    INSERTION CATHETER DIALYSIS performed by Jeff Aguilar MD at [address] [place] [address] Left 06/18/2021    LEFT BRACHIAL-CEPHALIC AV FISTULA CREATION performed by Asencion Essex, MD at [address] [place] Left 7/27/2021    LEFT UPPER EXTREMITY CEPHALIC VEIN SUPERFICIALIZATION performed by Asencion Essex, MD at [address] (CERVIX STATUS UNKNOWN)  10/17/2018    POLYPECTOMY      RECTAL SURGERY      fistula repair    TONSILLECTOMY AND ADENOIDECTOMY      VASCULAR SURGERY  07/12/2021    [facility]- Ultrasound-guided cannulation left distal upper arm cephalic vein with 4 French glide sheath, Left upper extremity fistulogram's including venography the superior vena cava    VASCULAR SURGERY  07/27/2021    [facility]- Superficialization of the left upper arm cephalic vein arteriovenous fistula       Home Medications:  Prior to Admission medications    Medication Sig Start Date End Date Taking? Authorizing Provider   Ferric Citrate (AURYXIA) 1  MG(Fe) TABS Take 2 tablets by mouth 10/19/21   Historical Provider, MD   b complex-C-folic acid (NEPHROCAPS) 1 MG capsule Take 1 capsule by mouth daily 8/14/21   JAMES Ross - CNP   TRULICITY 3 IY/8.8LU SOPN 3 mg once a week  5/31/21   Historical Provider, MD   levothyroxine (SYNTHROID) 88 MCG tablet Take 88 mcg by mouth Daily  5/27/21   Historical Provider, MD   rosuvastatin (CRESTOR) 20 MG tablet Take 20 mg by mouth daily  5/31/21   Historical Provider, MD   cetirizine (ZYRTEC) 10 MG tablet Take 10 mg by mouth as needed for Allergies     Historical Provider, MD   diazePAM (VALIUM) 2 MG tablet Take 2 mg by mouth as needed for Anxiety.      Historical Provider, MD   fluticasone (FLONASE) 50 MCG/ACT nasal spray 2 sprays by Nasal route daily as needed for Rhinitis  3/22/21   Historical Provider, MD   insulin lispro (HUMALOG KWIKPEN) 200 UNIT/ML SOPN pen Inject 25-80 Units into the skin 3 times daily (with meals)  10/21/20   Historical Provider, MD   Insulin Pen Needle (B-D ULTRAFINE III SHORT PEN) 31G X 8 MM MISC USE AS DIRECTED TO INJECT FOUR TIMES DAILY 10/21/20   Historical Provider, MD   Insulin Syringe-Needle U-100 (BD INSULIN SYRINGE U/F) 31G X 5/16\" 1 ML MISC AS DIRECTED FOUR TIMES A DAY 10/27/20   Historical Provider, MD   FreeStyle Lancets MISC Use as instructed 4 x daily 10/21/20   Historical Provider, MD   Omega-3 Fatty Acids (FISH OIL) 1000 MG CAPS Take 1,000 mg by mouth Daily with supper     Historical Provider, MD   epoetin jose ramon (EPOGEN;PROCRIT) 08320 UNIT/ML injection Inject 10,000 Units into the skin every 30 days     Historical Provider, MD   lamoTRIgine (LAMICTAL) 100 MG tablet Take 100 mg by mouth daily Indications: 1/2 tablet with dinner    Historical Provider, MD   allopurinol (ZYLOPRIM) 100 MG tablet Take 100 mg by mouth 2 times daily    Historical Provider, MD   citalopram (CELEXA) 40 MG tablet Take 40 mg by mouth daily    Historical Provider, MD   busPIRone (BUSPAR) 10 MG tablet Take 20 mg by mouth 2 times daily    Historical Provider, MD   aspirin 81 MG tablet Take 81 mg by mouth daily With Dinner    Historical Provider, MD   Insulin Glargine (LANTUS SC) Inject 70 Units into the skin daily With Evening Meals    Historical Provider, MD       Allergies:    Codeine    Social History:    The patient currently lives at home  Tobacco:   reports that she has never smoked. She has never used smokeless tobacco.  Alcohol:   reports no history of alcohol use. Illicit Drugs: none    Family History:      Problem Relation Age of Onset    Lung Cancer Mother     Brain Cancer Mother     Heart Attack Father     Prostate Cancer Father     Heart Disease Father     Stroke Father     Uterine Cancer Maternal Grandmother     Cervical Cancer Maternal Grandmother     Diabetes Maternal Grandfather     Other Maternal Grandfather         histoplasmosis    Diabetes Paternal Grandfather        Review of Systems:   Review of Systems   Constitutional: Positive for fatigue. Negative for fever.    Respiratory: Negative for shortness of breath. Gastrointestinal: Positive for abdominal pain, nausea and vomiting. Genitourinary: Positive for flank pain (right flank). Neurological: Positive for weakness. All other systems reviewed and are negative. 14 point review of systems is negative except as specifically addressed above. Physical Examination:  BP (!) 123/45   Pulse 84   Temp 98.3 °F (36.8 °C)   Resp 18   LMP 01/01/2000   SpO2 100%   Physical Exam  Vitals reviewed. Constitutional:       Comments: 58 yr old female with oxygen in place at 2L per nc appears in no respiratory distress   HENT:      Head: Normocephalic. Right Ear: External ear normal.      Left Ear: External ear normal.      Mouth/Throat:      Mouth: Mucous membranes are moist.   Eyes:      Conjunctiva/sclera: Conjunctivae normal.   Cardiovascular:      Rate and Rhythm: Normal rate and regular rhythm. Heart sounds: Normal heart sounds. Pulmonary:      Effort: Pulmonary effort is normal.      Breath sounds: Normal breath sounds. Abdominal:      Palpations: Abdomen is soft. Tenderness: There is no abdominal tenderness. Musculoskeletal:         General: Normal range of motion. Cervical back: Neck supple. Comments: Mild right CVA ttp   Skin:     General: Skin is warm and dry. Neurological:      Mental Status: She is alert and oriented to person, place, and time.        Diagnostic Data:  CBC:  Recent Labs     06/25/22  1423   WBC 16.2*   HGB 8.9*   HCT 27.3*        BMP:  Recent Labs     06/25/22  1423   *   K 3.8   CL 89*   CO2 21*   BUN 77*   CREATININE 8.5*   CALCIUM 9.6     Recent Labs     06/25/22  1423   AST 17   ALT 8   BILITOT 0.3   ALKPHOS 89   Urinalysis:  Lab Results   Component Value Date    NITRU Negative 06/25/2022    WBCUA 34 06/25/2022    BACTERIA TRACE 06/25/2022    RBCUA 11 06/25/2022    RBCUA 1 09/16/2015    BLOODU MODERATE 06/25/2022    SPECGRAV 1.018 06/25/2022    GLUCOSEU 250 06/25/2022 CT ABDOMEN PELVIS WO CONTRAST Additional Contrast? None    Result Date: 6/25/2022  Exam: CT OF THE ABDOMEN/PELVIS WITHOUT CONTRAST Clinical data: Right flank pain, elevated white blood cell count, history of hemodialysis. Technique: Direct contiguous axial CT images were acquired through the abdomen and pelvis without contrast using soft tissue and bone algorithms. Reformatted/MPR images were performed. Radiation dose: CTDIvol =mGy, DLP =mGy x cm. Limitations: Lack of intravenous contrast limits evaluation of solid viscera. Lack of oral contrast limits evaluation of the bowel loops. Prior Studies: No prior studies submitted. Findings: Lung bases: Grossly clear. Liver:Unremarkable size, contour, and density. No evidence of mass. No evidence of dilated ducts. Gallbladder:Status post cholecystectomy. Spleen: Grossly unremarkable. Pancreas/adrenal glands: Grossly unremarkable size, contour and density. Kidneys: In anatomic position. Cortical atrophy of the kidneys. Grossly unremarkablerenal size, contour and density. No renal or ureteral calculi. No hydronephrosis. Perinephric space is unremarkable. Retroperitoneum: No retroperitoneal lymphadenopathy. Atherosclerosis of the abdominal aorta. The IVC is grossly unremarkable. IVC filter. Peritoneal cavity: Stranding/inflammation of the mesentery and retroperitoneum. No evidence of free air or ascites. Surgical changes anterior abdomen. Gastrointestinal tract: Nondistended small bowel and colon. No evidence of obstruction. Appendix: Unremarkable. Pelvis: Solid and hollow viscera grossly unremarkable. Bladder is moderately distended. Status post hysterectomy. Osseous structures: No acute or destructive bony process identified. 1.  Cortical thinning of the kidneys with perinephric inflammation. 2.  Inflammation of the mesentery throughout the abdomen and pelvis. 3.  Status post cholecystectomy and status post hysterectomy. 4.  IVC filter.  Recommendation: Follow up as clinically indicated. All CT scans at this facility utilize dose modulation, iterative reconstruction, and/or weight based dosing when appropriate to reduce radiation dose to as low as reasonably achievable. Assessment/Plan:  Principal Problem:    Pyelonephritis  Active Problems:    Thyroid disease    Sleep apnea    Vitamin D deficiency    ESRD on dialysis ([facility] [address]    Diabetes ([facility] [address]    Hypertension    Hypoxia  Resolved Problems:    * No resolved hospital problems. *     Principal Problem:    Pyelonephritis/ESRD on dialysis    -pharmacy to dose levaquin   -consult nephrology   -follow blood cultures   -follow cbc   -daily weight   -monitor lytes   -in and out ua with reflex to cx   -pain control  Active Problems:    Thyroid disease   -tsh   -continue synthroid     Sleep apnea/Hypoxia   -continue cpap/bipap as at home   -monitor for increasing suppelemental o2 requirements   -?hypoxia due to sedation/pain med     Vitamin D deficiency   -vit [id number]    Diabetes (HCC)   -HgA1c   -poc glucose qid   -medium dose insulin coverage   -hypoglycemia orders   -continue home insulin when vomiting has resolved      and taking diet   Hypertension   -monitor blood pressure  Resolved Problems:    * No resolved hospital problems.  *  Signed:  JAMES Campa - CNP, 6/25/2022 5:55 PM

## 2022-06-26 LAB
ANION GAP SERPL CALCULATED.3IONS-SCNC: 23 MMOL/L (ref 7–19)
ANISOCYTOSIS: ABNORMAL
BASOPHILS ABSOLUTE: 0 K/UL (ref 0–0.2)
BASOPHILS RELATIVE PERCENT: 0.3 % (ref 0–1)
BUN BLDV-MCNC: 81 MG/DL (ref 8–23)
CALCIUM SERPL-MCNC: 9.2 MG/DL (ref 8.8–10.2)
CHLORIDE BLD-SCNC: 91 MMOL/L (ref 98–111)
CO2: 19 MMOL/L (ref 22–29)
CREAT SERPL-MCNC: 8.5 MG/DL (ref 0.5–0.9)
EOSINOPHILS ABSOLUTE: 0.2 K/UL (ref 0–0.6)
EOSINOPHILS RELATIVE PERCENT: 1.7 % (ref 0–5)
GFR AFRICAN AMERICAN: 6
GFR NON-AFRICAN AMERICAN: 5
GLUCOSE BLD-MCNC: 277 MG/DL (ref 74–109)
GLUCOSE BLD-MCNC: 283 MG/DL (ref 70–99)
GLUCOSE BLD-MCNC: 335 MG/DL (ref 70–99)
GLUCOSE BLD-MCNC: 346 MG/DL (ref 70–99)
GLUCOSE BLD-MCNC: 364 MG/DL (ref 70–99)
HBA1C MFR BLD: 9.4 % (ref 4–6)
HCT VFR BLD CALC: 25.9 % (ref 37–47)
HEMOGLOBIN: 8.1 G/DL (ref 12–16)
IMMATURE GRANULOCYTES #: 0.2 K/UL
LYMPHOCYTES ABSOLUTE: 1.8 K/UL (ref 1.1–4.5)
LYMPHOCYTES RELATIVE PERCENT: 13.2 % (ref 20–40)
MACROCYTES: ABNORMAL
MAGNESIUM: 2.2 MG/DL (ref 1.6–2.4)
MCH RBC QN AUTO: 35.2 PG (ref 27–31)
MCHC RBC AUTO-ENTMCNC: 31.3 G/DL (ref 33–37)
MCV RBC AUTO: 112.6 FL (ref 81–99)
MONOCYTES ABSOLUTE: 0.5 K/UL (ref 0–0.9)
MONOCYTES RELATIVE PERCENT: 3.9 % (ref 0–10)
NEUTROPHILS ABSOLUTE: 10.6 K/UL (ref 1.5–7.5)
NEUTROPHILS RELATIVE PERCENT: 79.4 % (ref 50–65)
PDW BLD-RTO: 17.5 % (ref 11.5–14.5)
PERFORMED ON: ABNORMAL
PLATELET # BLD: 212 K/UL (ref 130–400)
PLATELET SLIDE REVIEW: ABNORMAL
PMV BLD AUTO: 10.4 FL (ref 9.4–12.3)
POLYCHROMASIA: ABNORMAL
POTASSIUM REFLEX MAGNESIUM: 3.4 MMOL/L (ref 3.5–5)
RBC # BLD: 2.3 M/UL (ref 4.2–5.4)
SODIUM BLD-SCNC: 133 MMOL/L (ref 136–145)
TSH SERPL DL<=0.05 MIU/L-ACNC: 2.71 UIU/ML (ref 0.27–4.2)
WBC # BLD: 13.4 K/UL (ref 4.8–10.8)

## 2022-06-26 PROCEDURE — 6370000000 HC RX 637 (ALT 250 FOR IP): Performed by: NURSE PRACTITIONER

## 2022-06-26 PROCEDURE — 6360000002 HC RX W HCPCS: Performed by: NURSE PRACTITIONER

## 2022-06-26 PROCEDURE — 85025 COMPLETE CBC W/AUTO DIFF WBC: CPT

## 2022-06-26 PROCEDURE — 6370000000 HC RX 637 (ALT 250 FOR IP): Performed by: HOSPITALIST

## 2022-06-26 PROCEDURE — 83735 ASSAY OF MAGNESIUM: CPT

## 2022-06-26 PROCEDURE — 82947 ASSAY GLUCOSE BLOOD QUANT: CPT

## 2022-06-26 PROCEDURE — 36415 COLL VENOUS BLD VENIPUNCTURE: CPT

## 2022-06-26 PROCEDURE — 87040 BLOOD CULTURE FOR BACTERIA: CPT

## 2022-06-26 PROCEDURE — 80048 BASIC METABOLIC PNL TOTAL CA: CPT

## 2022-06-26 PROCEDURE — 1210000000 HC MED SURG R&B

## 2022-06-26 RX ORDER — SODIUM BICARBONATE 650 MG/1
650 TABLET ORAL 4 TIMES DAILY
Status: DISCONTINUED | OUTPATIENT
Start: 2022-06-26 | End: 2022-06-27 | Stop reason: HOSPADM

## 2022-06-26 RX ORDER — INSULIN GLARGINE 100 [IU]/ML
15 INJECTION, SOLUTION SUBCUTANEOUS 2 TIMES DAILY
Status: DISCONTINUED | OUTPATIENT
Start: 2022-06-26 | End: 2022-06-27

## 2022-06-26 RX ADMIN — ALLOPURINOL 100 MG: 100 TABLET ORAL at 08:21

## 2022-06-26 RX ADMIN — CITALOPRAM HYDROBROMIDE 40 MG: 20 TABLET ORAL at 08:21

## 2022-06-26 RX ADMIN — ALLOPURINOL 100 MG: 100 TABLET ORAL at 20:51

## 2022-06-26 RX ADMIN — BUSPIRONE HYDROCHLORIDE 20 MG: 10 TABLET ORAL at 20:50

## 2022-06-26 RX ADMIN — DOCUSATE SODIUM 200 MG: 100 CAPSULE, LIQUID FILLED ORAL at 08:21

## 2022-06-26 RX ADMIN — ROSUVASTATIN 20 MG: 20 TABLET, FILM COATED ORAL at 20:51

## 2022-06-26 RX ADMIN — BUSPIRONE HYDROCHLORIDE 20 MG: 10 TABLET ORAL at 08:21

## 2022-06-26 RX ADMIN — NEPHROCAP 1 MG: 1 CAP ORAL at 08:21

## 2022-06-26 RX ADMIN — OXYCODONE HYDROCHLORIDE AND ACETAMINOPHEN 500 MG: 500 TABLET ORAL at 08:21

## 2022-06-26 RX ADMIN — HEPARIN SODIUM 5000 UNITS: 5000 INJECTION INTRAVENOUS; SUBCUTANEOUS at 12:40

## 2022-06-26 RX ADMIN — SODIUM BICARBONATE 650 MG: 650 TABLET ORAL at 12:36

## 2022-06-26 RX ADMIN — INSULIN GLARGINE 15 UNITS: 100 INJECTION, SOLUTION SUBCUTANEOUS at 10:11

## 2022-06-26 RX ADMIN — INSULIN LISPRO 8 UNITS: 100 INJECTION, SOLUTION INTRAVENOUS; SUBCUTANEOUS at 17:28

## 2022-06-26 RX ADMIN — SODIUM BICARBONATE 650 MG: 650 TABLET ORAL at 17:17

## 2022-06-26 RX ADMIN — INSULIN LISPRO 6 UNITS: 100 INJECTION, SOLUTION INTRAVENOUS; SUBCUTANEOUS at 10:12

## 2022-06-26 RX ADMIN — INSULIN GLARGINE 15 UNITS: 100 INJECTION, SOLUTION SUBCUTANEOUS at 20:51

## 2022-06-26 RX ADMIN — INSULIN LISPRO 5 UNITS: 100 INJECTION, SOLUTION INTRAVENOUS; SUBCUTANEOUS at 20:51

## 2022-06-26 RX ADMIN — CALCITRIOL CAPSULES 0.25 MCG 0.5 MCG: 0.25 CAPSULE ORAL at 08:21

## 2022-06-26 RX ADMIN — DOCUSATE SODIUM 200 MG: 100 CAPSULE, LIQUID FILLED ORAL at 20:51

## 2022-06-26 RX ADMIN — SODIUM BICARBONATE 650 MG: 650 TABLET ORAL at 20:50

## 2022-06-26 RX ADMIN — INSULIN LISPRO 8 UNITS: 100 INJECTION, SOLUTION INTRAVENOUS; SUBCUTANEOUS at 12:32

## 2022-06-26 RX ADMIN — CARVEDILOL 6.25 MG: 6.25 TABLET, FILM COATED ORAL at 08:21

## 2022-06-26 RX ADMIN — CARVEDILOL 6.25 MG: 6.25 TABLET, FILM COATED ORAL at 17:17

## 2022-06-26 RX ADMIN — ASPIRIN 81 MG: 81 TABLET, COATED ORAL at 17:17

## 2022-06-26 ASSESSMENT — PAIN SCALES - GENERAL: PAINLEVEL_OUTOF10: 0

## 2022-06-26 NOTE — PROGRESS NOTES
Lab unsuccessful after multiple attempts by varies lab staff to obtain blood cultures.    Electronically signed by Elenita Myrick RN on 6/25/2022 at 10:11 PM

## 2022-06-26 NOTE — PROGRESS NOTES
Progress Note  Date:2022       Room:0507/507-01  Patient Name:Sara Waterman     YOB: 1960     Age:62 y.o. Subjective    Subjective: 58 y.o. female with history of depression, HTN, diabetes, hyperlipidemia, hypothyroidism, obesity, ESRD on home hemodialysis who presented to [facility] ED complaining of vomiting and flank pain. In ED, pt with oxygen saturation of 80% requiring 2L of oxygen per nasal prongs.  CT abd/pelvis-cortical thinning of the kidneys with perinephric inflammation. Inflammation of the mesentery throughout the abdomen and pelvis. GI molecular panel negative. Pt is admitted to hospitalist service for nephritis. Was given ceftriaxone in the ED, patient transition to [address] [place] [address]. Seen in house this morning, denied any acute overnight event, says she is feeling overall better, denied any nausea or emesis, flank pain has improved. Waiting nephrology evaluation for dialysis. Review of Systems: 12 point system review negative except as stated above. Objective         Vitals Last 24 Hours:  TEMPERATURE:  Temp  Av.7 °F (36.5 °C)  Min: 97.3 °F (36.3 °C)  Max: 98.3 °F (36.8 °C)  RESPIRATIONS RANGE: Resp  Av.5  Min: 16  Max: 18  PULSE OXIMETRY RANGE: SpO2  Av.8 %  Min: 92 %  Max: 100 %  PULSE RANGE: Pulse  Av.5  Min: 66  Max: 90  BLOOD PRESSURE RANGE: Systolic (75QXG), XYQ:149 , Min:112 , OIN:016   ; Diastolic (28RYW), ZMH:43, Min:45, Max:89    I/O (24Hr): Intake/Output Summary (Last 24 hours) at 2022 1121  Last data filed at 2022 0930  Gross per 24 hour   Intake 0 ml   Output --   Net 0 ml       Physical Examination:  General: Well-developed, no acute distress lying comfortably in bed. HEENT: Atraumatic normocephalic, range of motion normal, no JVD, no tracheal deviation noted.   Cardiac: Normal S1-S2   Respiratory: clear To auscultation bilaterally, no rhonchi or rales, no wheezing  Abdomen: Soft, positive bowel sounds in all quadrants, no distention, nontender to palpation, no organomegaly noted, rebound noted. : Improved right flank pain  Extremities: no tenderness, no edema, moves all extremities  Psych: Affect normal and good eye contact, behavioral normal.        Labs/Imaging/Diagnostics    Labs:  CBC:  Recent Labs     06/25/22  1423 06/26/22  0805   WBC 16.2* 13.4*   RBC 2.58* 2.30*   HGB 8.9* 8.1*   HCT 27.3* 25.9*   .8* 112.6*   RDW 17.2* 17.5*    212     CHEMISTRIES:  Recent Labs     06/25/22  1423 06/26/22  0805   * 133*   K 3.8 3.4*   CL 89* 91*   CO2 21* 19*   BUN 77* 81*   CREATININE 8.5* 8.5*   GLUCOSE 252* 277*   MG  --  2.2     PT/INR:No results for input(s): PROTIME, INR in the last 72 hours. APTT:No results for input(s): APTT in the last 72 hours. LIVER PROFILE:  Recent Labs     06/25/22  1423   AST 17   ALT 8   BILITOT 0.3   ALKPHOS 89       Imaging Last 24 Hours:  CT ABDOMEN PELVIS WO CONTRAST Additional Contrast? None    Result Date: 6/25/2022  Exam: CT OF THE ABDOMEN/PELVIS WITHOUT CONTRAST Clinical data: Right flank pain, elevated white blood cell count, history of hemodialysis. Technique: Direct contiguous axial CT images were acquired through the abdomen and pelvis without contrast using soft tissue and bone algorithms. Reformatted/MPR images were performed. Radiation dose: CTDIvol =mGy, DLP =mGy x cm. Limitations: Lack of intravenous contrast limits evaluation of solid viscera. Lack of oral contrast limits evaluation of the bowel loops. Prior Studies: No prior studies submitted. Findings: Lung bases: Grossly clear. Liver:Unremarkable size, contour, and density. No evidence of mass. No evidence of dilated ducts. Gallbladder:Status post cholecystectomy. Spleen: Grossly unremarkable. Pancreas/adrenal glands: Grossly unremarkable size, contour and density. Kidneys: In anatomic position. Cortical atrophy of the kidneys. Grossly unremarkablerenal size, contour and density. No renal or ureteral calculi.  No hydronephrosis. Perinephric space is unremarkable. Retroperitoneum: No retroperitoneal lymphadenopathy. Atherosclerosis of the abdominal aorta. The IVC is grossly unremarkable. IVC filter. Peritoneal cavity: Stranding/inflammation of the mesentery and retroperitoneum. No evidence of free air or ascites. Surgical changes anterior abdomen. Gastrointestinal tract: Nondistended small bowel and colon. No evidence of obstruction. Appendix: Unremarkable. Pelvis: Solid and hollow viscera grossly unremarkable. Bladder is moderately distended. Status post hysterectomy. Osseous structures: No acute or destructive bony process identified. 1.  Cortical thinning of the kidneys with perinephric inflammation. 2.  Inflammation of the mesentery throughout the abdomen and pelvis. 3.  Status post cholecystectomy and status post hysterectomy. 4.  IVC filter. Recommendation: Follow up as clinically indicated. All CT scans at this facility utilize dose modulation, iterative reconstruction, and/or weight based dosing when appropriate to reduce radiation dose to as low as reasonably achievable. Assessment//Plan           Hospital Problems           Last Modified POA    * (Principal) Pyelonephritis 6/25/2022 Yes    Thyroid disease 6/25/2022 Yes    Sleep apnea 6/25/2022 Yes    Overview Signed 5/3/2021  9:45 AM by Ulices Navas MA     Last Assessment & Plan:   Patient states that she currently uses a CPAP at night for obstructive sleep apnea. She states that she has noticed that she has had more drainage and sinus pressure causing throat and uvula swelling. She states that she feels like she is gagging or choking at times while she is trying to sleep. She is concerned and wishes to be evaluated for this. I will refer to ENT at this time. I will also treat for a possible sinus infection, unable to do steroids due to her being an insulin-dependent diabetic.          Vitamin D deficiency 6/25/2022 Yes    ESRD on dialysis [facility]) 6/25/2022 Yes    Diabetes ([facility] [address] 6/25/2022 Yes    Hypertension 6/25/2022 Yes    Hypoxia 6/25/2022 Yes        Assessment & Plan    Pyelonephritis  History of the abdomen as noted above. Pharmacy to dose Levaquin  Follow blood cultures  Pain management. End-stage renal disease on dialysis  Metabolic acidosis  Nephrology consultation for dialysis session. Avoid nephrotoxic agent  Sodium bicarb initiated. Obstructive sleep apnea uses CPAP at home  Obesity hypoventilation syndrome  Continue device use in house. Type 2 diabetes  A1c noted to be 9.4  Initiated on Lantus 15 units twice daily continuously sliding scale  Hypoglycemia protocol. Vitamin D deficiency: Continue supplement    Hypothyroidism: Continue Synthroid  TSH noted to be normal    Underlining psych issue  Continue BuSpar, citalopram    Hyperlipidemia: Continue Crestor. Code: Full  Diet: Renal/diabetic  Disposition: May be home tomorrow if continues to remain stable.       Electronically signed by Leanne Vo MD on 6/26/22 at 11:21 AM CDT

## 2022-06-26 NOTE — CONSULTS
Renal Consult Note    Thank you to requesting provider: Dr Shana Rivero, for asking us to see Jaylyn Montes    Reason for consultation:  ESRD    Chief Complaint: Flank pain. History of Presenting Illness      58year-old pleasant woman with past medical history of morbid obesity, depression, hypertension, type 2 diabetes and end-stage renal disease. She has been on conventional hemodialysis but has recently switched to home hemodialysis. She has an upper extremity arteriovenous fistula. Currently presents to the emergency room complaining of right-sided flank pain associated with some nausea and vomiting. Recently she was having some dysuria. Patient was also found to be hypoxemic in the emergency room and was placed on oxygen via nasal cannula. Her CT scan is in favor of perinephric inflammation throughout the abdomen and pelvis. Patient is currently receiving intravenous antibiotics for possible pyelonephritis. Renal service was consulted to manage her ESRD.      Past Medical/Surgical History      Active Ambulatory Problems     Diagnosis Date Noted    Morbid obesity ([address] 09/15/2011    Thyroid disease 02/26/2017    Sleep apnea 02/26/2017    Mood disorder ([address] 02/26/2017    Hypertension associated with diabetes ([address] 10/17/2016    Anemia of chronic renal failure 12/12/2016    Type 2 DM with CKD stage 3 and hypertension ([address] 02/26/2017    Hypertriglyceridemia 02/26/2017    Anxiety 02/26/2017    Embolism ([address] 01/01/2004    Vitamin D deficiency 02/26/2017    Anemia 02/26/2017    Hypocalcemia 02/26/2017    Allergic rhinitis with postnasal drip 04/06/2021    B12 deficiency 10/17/2016    Deep vein thrombosis of lower extremity ([address] 10/09/2019    Disease of liver 10/09/2019    Mixed diabetic hyperlipidemia associated with type 2 diabetes mellitus ([address] 10/17/2016    ESRD on dialysis ([address] 07/24/2018    Acute hemodialysis encounter ([address] 08/10/2021    Acute hypoxemic respiratory failure ([address] 08/10/2021    Diabetes ([facility] 08/10/2021    Hypertension 08/10/2021    Hypoxia      Resolved Ambulatory Problems     Diagnosis Date Noted    No Resolved Ambulatory Problems     Past Medical History:   Diagnosis Date    Arthritis     CKD (chronic kidney disease)     Colon polyp     Depression     Embolism - blood clot 2004    Hemodialysis patient ([facility]     Hx of blood clots     Hyperlipidemia     Obesity     Type II or unspecified type diabetes mellitus without mention of complication, not stated as uncontrolled          Review of Systems     Constitutional:  No weight loss, no fever/chills  Eyes:  No eye pain, no eye redness  Cardiovascular:  No chest pain, no worsening of edema  Respiratory:  No hemoptysis, no stidor  Gastrointestinal:  No blood in stool, no n/v, no diarrhea  Genitoruinary:  No hematuria, no difficulty with urination  Musculoskeletal:  No joint swelling, no redness  Integumentary:  No Rash, no itching  Neurological:  No focal weakness, No new sensory deficit  Psychiatric:  No depression, no confusion  Endocrine:  No polyuria, no polydipsia       Medications        Current Facility-Administered Medications:     insulin glargine (LANTUS) injection vial 15 Units, 15 Units, SubCUTAneous, BID, Selvin Rowan MD, 15 Units at 06/26/22 1011    sodium bicarbonate tablet 650 mg, 650 mg, Oral, 4x Daily, Selvin Rowan MD    0.9 % sodium chloride infusion, 1,000 mL, IntraVENous, Continuous, Selam Watts MD, Stopped at 06/25/22 2248    sodium chloride flush 0.9 % injection 5-40 mL, 5-40 mL, IntraVENous, 2 times per day, JAMES Bennett - CNP    sodium chloride flush 0.9 % injection 5-40 mL, 5-40 mL, IntraVENous, PRN, JAMES Bennett - CNP    0.9 % sodium chloride infusion, , IntraVENous, PRN, Rhoda Moreira, APRN - CNP    ondansetron (ZOFRAN-ODT) disintegrating tablet 4 mg, 4 mg, Oral, Q8H PRN **OR** ondansetron (ZOFRAN) injection 4 mg, 4 mg, IntraVENous, Q6H PRN, Rhoda Moreira, JAMES - CNP    polyethylene glycol (GLYCOLAX) packet 17 g, 17 g, Oral, Daily PRN, [facility]    acetaminophen (TYLENOL) tablet 650 mg, 650 mg, Oral, Q6H PRN **OR** acetaminophen (TYLENOL) suppository 650 mg, 650 mg, Rectal, Q6H PRN, Clemencia JAMES Robison CNP    heparin (porcine) injection 5,000 Units, 5,000 Units, SubCUTAneous, 3 times per day, Clemencia JAMES Robison CNP    allopurinol (ZYLOPRIM) tablet 100 mg, 100 mg, Oral, BID, [facility], APRN - CNP, 100 mg at 06/26/22 8377    aspirin EC tablet 81 mg, 81 mg, Oral, Daily, [facility] JAMES Robison CNP    b complex-C-folic acid (NEPHROCAPS) capsule 1 mg, 1 capsule, Oral, Daily, [facility], APRN - CNP, 1 mg at 06/26/22 6314    busPIRone (BUSPAR) tablet 20 mg, 20 mg, Oral, BID, [facility], APRN - CNP, 20 mg at 06/26/22 3888    citalopram (CELEXA) tablet 40 mg, 40 mg, Oral, Daily, [facility], 40 mg at 06/26/22 0821    fluticasone (FLONASE) 50 MCG/ACT nasal spray 2 spray, 2 spray, Nasal, Daily PRN, [facility] JAMES Robison CNP    levothyroxine (SYNTHROID) tablet 88 mcg, 88 mcg, Oral, Daily, [facility]    rosuvastatin (CRESTOR) tablet 20 mg, 20 mg, Oral, Nightly, [facility], 20 mg at 06/25/22 2247    [COMPLETED] levoFLOXacin (LEVAQUIN) 750 MG/150ML infusion 750 mg, 750 mg, IntraVENous, Once, Stopped at 06/26/22 0111 **AND** [START ON 6/27/2022] levoFLOXacin (LEVAQUIN) 500 MG/100ML infusion 500 mg, 500 mg, IntraVENous, Q48H, Clemencia JAMES Robison CNP    glucose chewable tablet 16 g, 4 tablet, Oral, PRN, [facility] JAMES Robison CNP    dextrose bolus 10% 125 mL, 125 mL, IntraVENous, PRN **OR** dextrose bolus 10% 250 mL, 250 mL, IntraVENous, PRN, JAMES Aleman CNP    glucagon (rDNA) injection 1 mg, 1 mg, IntraMUSCular, PRN, JAMES Aleman - CNP    dextrose 5 % solution, 100 mL/hr, IntraVENous, PRN, JAMES Aleman CNP    HYDROmorphone HCl PF (DILAUDID) injection 0.5 mg, 0.5 mg, IntraVENous, Q4H PRN, JAMES Cantu CNP    calcitRIOL (ROCALTROL) capsule 0.5 mcg, 0.5 mcg, Oral, Daily, JAMES Cantu CNP, 0.5 mcg at 06/26/22 5100    carvedilol (COREG) tablet 6.25 mg, 6.25 mg, Oral, BID WC, JAMES Cantu CNP, 6.25 mg at 06/26/22 6253    docusate sodium (COLACE) capsule 200 mg, 200 mg, Oral, TID, JAMES Cantu CNP, 200 mg at 06/26/22 9542    ascorbic acid (VITAMIN C) tablet 500 mg, 500 mg, Oral, Daily, JAMES Cantu CNP, 500 mg at 06/26/22 6638    insulin lispro (HUMALOG) injection vial 0-12 Units, 0-12 Units, SubCUTAneous, TID WC, JAMES Cantu CNP, 6 Units at 06/26/22 1012    insulin lispro (HUMALOG) injection vial 0-6 Units, 0-6 Units, SubCUTAneous, Nightly, JAMES Cantu CNP, 3 Units at 06/25/22 2249  Outpatient Medications Marked as Taking for the 6/25/22 encounter [facility] Encounter)   Medication Sig Dispense Refill    Insulin Regular Human (HUMULIN R U-500 KWIKPEN SC) Inject 160 Units into the skin 160 qam  120qpm      potassium chloride (KLOR-CON) 20 MEQ packet Take 20 mEq by mouth daily      vitamin C (ASCORBIC ACID) 500 MG tablet Take 500 mg by mouth daily      carvedilol (COREG) 6.25 MG tablet Take 6.25 mg by mouth 2 times daily (with meals)      docusate sodium (COLACE) 100 MG capsule Take 200 mg by mouth in the morning, at noon, and at bedtime      calcitRIOL (ROCALTROL) 0.5 MCG capsule Take 0.5 mcg by mouth daily      Evolocumab (REPATHA SC) Inject into the skin         Allergies   Codeine    Family History       Family History   Problem Relation Age of Onset    Lung Cancer Mother     Brain Cancer Mother     Heart Attack Father     Prostate Cancer Father     Heart Disease Father     Stroke Father     Uterine Cancer Maternal Grandmother     Cervical Cancer Maternal Grandmother     Diabetes Maternal Grandfather     Other Maternal Grandfather         histoplasmosis    Diabetes Paternal Grandfather      Family history negative for kidney disease. Social History      Social History     Socioeconomic History    Marital status:      Spouse name: None    Number of children: None    Years of education: None    Highest education level: None   Occupational History    None   Tobacco Use    Smoking status: Never Smoker    Smokeless tobacco: Never Used   Vaping Use    Vaping Use: Never used   Substance and Sexual Activity    Alcohol use: No    Drug use: No    Sexual activity: Never   Other Topics Concern    None   Social History Narrative    None     Social Determinants of Health     Financial Resource Strain:     Difficulty of Paying Living Expenses: Not on file   Food Insecurity:     Worried About Running Out of Food in the Last Year: Not on file    Jocelyn of Food in the Last Year: Not on file   Transportation Needs:     Lack of Transportation (Medical): Not on file    Lack of Transportation (Non-Medical): Not on file   Physical Activity:     Days of Exercise per Week: Not on file    Minutes of Exercise per Session: Not on file   Stress:     Feeling of Stress : Not on file   Social Connections:     Frequency of Communication with Friends and Family: Not on file    Frequency of Social Gatherings with Friends and Family: Not on file    Attends Protestant Services: Not on file    Active Member of [address] [place] [address] or Organizations: Not on file    Attends Club or Organization Meetings: Not on file    Marital Status: Not on file   Intimate Partner Violence:     Fear of Current or Ex-Partner: Not on file    Emotionally Abused: Not on file    Physically Abused: Not on file    Sexually Abused: Not on file   Housing Stability:     Unable to Pay for Housing in the Last Year: Not on file    Number of [place] in the Last Year: Not on file    Unstable Housing in the Last Year: Not on file       Physical Exam     Blood pressure 112/61, pulse 66, temperature 97.5 °F (36.4 °C), temperature source Temporal, resp.  rate 16, height 5' 3\" (1.6 m), weight (!) 324 lb (147 kg), last menstrual period 01/01/2000, SpO2 100 %, not currently breastfeeding. General:  NAD, A+Ox3, ill-appearing, normal body habitus  HEENT:  PERRL, EOMI  Neck:  Supple, normal range of movement  Chest:  CTAB, good respiratory effort, good air movement  CV:  RRR, soft systolic murmur  Abdomen:  NTND, soft, +BS, no hepatosplenomegaly  Extremities:  trace peripheral edema  Neurological:  Moving all four extremities  Lymphatics:  No palpable lymph nodes   Skin:  No rash, no jaundice  Psychiatric:  Normal insight and judgement, good recall    Data     Recent Labs     06/25/22  1423 06/26/22  0805   WBC 16.2* 13.4*   HGB 8.9* 8.1*   HCT 27.3* 25.9*   .8* 112.6*    212     Recent Labs     06/25/22  1423 06/26/22  0805   * 133*   K 3.8 3.4*   CL 89* 91*   CO2 21* 19*   GLUCOSE 252* 277*   MG  --  2.2   BUN 77* 81*   CREATININE 8.5* 8.5*   LABGLOM 5* 5*   GFRAA 6* 6*       Assessment:  1. End-stage renal disease  2. Diabetes with renal disease  3. Hypertension  4. Acute pyelonephritis  5. Anemia of chronic kidney disease  6. Hyponatremia  7. Hypokalemia  8. Morbid obesity        Plan:  Agree with IV antibiotics. We will provide the acute inpatient dialysis treatments 3 times weekly. We will also add Retacrit for anemia management. Follow-up labs.     Thank you for asking us to participate in the management of your patient, please do not hesitate to contact me for any concerns regarding my recommendations as outlined above.    -----------------------------  Electronically signed by Estrella Ferrera MD on 6/26/22 at 12:28 PM CDT

## 2022-06-27 VITALS
DIASTOLIC BLOOD PRESSURE: 36 MMHG | BODY MASS INDEX: 51.91 KG/M2 | HEIGHT: 63 IN | HEART RATE: 83 BPM | WEIGHT: 293 LBS | RESPIRATION RATE: 18 BRPM | SYSTOLIC BLOOD PRESSURE: 114 MMHG | TEMPERATURE: 97 F | OXYGEN SATURATION: 100 %

## 2022-06-27 LAB
ANION GAP SERPL CALCULATED.3IONS-SCNC: 25 MMOL/L (ref 7–19)
BASOPHILS ABSOLUTE: 0 K/UL (ref 0–0.2)
BASOPHILS RELATIVE PERCENT: 0.3 % (ref 0–1)
BUN BLDV-MCNC: 96 MG/DL (ref 8–23)
CALCIUM SERPL-MCNC: 9.5 MG/DL (ref 8.8–10.2)
CHLORIDE BLD-SCNC: 90 MMOL/L (ref 98–111)
CO2: 20 MMOL/L (ref 22–29)
CREAT SERPL-MCNC: 9.3 MG/DL (ref 0.5–0.9)
EOSINOPHILS ABSOLUTE: 0.3 K/UL (ref 0–0.6)
EOSINOPHILS RELATIVE PERCENT: 2.5 % (ref 0–5)
GFR AFRICAN AMERICAN: 5
GFR NON-AFRICAN AMERICAN: 4
GLUCOSE BLD-MCNC: 242 MG/DL (ref 70–99)
GLUCOSE BLD-MCNC: 309 MG/DL (ref 74–109)
GLUCOSE BLD-MCNC: 335 MG/DL (ref 70–99)
HCT VFR BLD CALC: 26.6 % (ref 37–47)
HEMOGLOBIN: 8.3 G/DL (ref 12–16)
IMMATURE GRANULOCYTES #: 0.1 K/UL
LYMPHOCYTES ABSOLUTE: 1.3 K/UL (ref 1.1–4.5)
LYMPHOCYTES RELATIVE PERCENT: 11.1 % (ref 20–40)
MCH RBC QN AUTO: 34.9 PG (ref 27–31)
MCHC RBC AUTO-ENTMCNC: 31.2 G/DL (ref 33–37)
MCV RBC AUTO: 111.8 FL (ref 81–99)
MONOCYTES ABSOLUTE: 0.5 K/UL (ref 0–0.9)
MONOCYTES RELATIVE PERCENT: 4.6 % (ref 0–10)
NEUTROPHILS ABSOLUTE: 9.5 K/UL (ref 1.5–7.5)
NEUTROPHILS RELATIVE PERCENT: 80.3 % (ref 50–65)
PDW BLD-RTO: 17.5 % (ref 11.5–14.5)
PERFORMED ON: ABNORMAL
PERFORMED ON: ABNORMAL
PLATELET # BLD: 207 K/UL (ref 130–400)
PMV BLD AUTO: 10.1 FL (ref 9.4–12.3)
POTASSIUM REFLEX MAGNESIUM: 3.8 MMOL/L (ref 3.5–5)
RBC # BLD: 2.38 M/UL (ref 4.2–5.4)
SODIUM BLD-SCNC: 135 MMOL/L (ref 136–145)
URINE CULTURE, ROUTINE: NORMAL
WBC # BLD: 11.8 K/UL (ref 4.8–10.8)

## 2022-06-27 PROCEDURE — 82947 ASSAY GLUCOSE BLOOD QUANT: CPT

## 2022-06-27 PROCEDURE — 6360000002 HC RX W HCPCS: Performed by: NURSE PRACTITIONER

## 2022-06-27 PROCEDURE — 6370000000 HC RX 637 (ALT 250 FOR IP): Performed by: HOSPITALIST

## 2022-06-27 PROCEDURE — 80048 BASIC METABOLIC PNL TOTAL CA: CPT

## 2022-06-27 PROCEDURE — 85025 COMPLETE CBC W/AUTO DIFF WBC: CPT

## 2022-06-27 PROCEDURE — 2580000003 HC RX 258: Performed by: NURSE PRACTITIONER

## 2022-06-27 PROCEDURE — 5A1D70Z PERFORMANCE OF URINARY FILTRATION, INTERMITTENT, LESS THAN 6 HOURS PER DAY: ICD-10-PCS | Performed by: INTERNAL MEDICINE

## 2022-06-27 PROCEDURE — 36415 COLL VENOUS BLD VENIPUNCTURE: CPT

## 2022-06-27 PROCEDURE — 6360000002 HC RX W HCPCS: Performed by: INTERNAL MEDICINE

## 2022-06-27 PROCEDURE — 8010000000 HC HEMODIALYSIS ACUTE INPT

## 2022-06-27 PROCEDURE — 6370000000 HC RX 637 (ALT 250 FOR IP): Performed by: NURSE PRACTITIONER

## 2022-06-27 RX ORDER — INSULIN GLARGINE 100 [IU]/ML
20 INJECTION, SOLUTION SUBCUTANEOUS 2 TIMES DAILY
Status: DISCONTINUED | OUTPATIENT
Start: 2022-06-27 | End: 2022-06-27 | Stop reason: HOSPADM

## 2022-06-27 RX ORDER — SODIUM BICARBONATE 650 MG/1
650 TABLET ORAL 3 TIMES DAILY
Qty: 90 TABLET | Refills: 3 | Status: SHIPPED | OUTPATIENT
Start: 2022-06-27 | End: 2022-09-23

## 2022-06-27 RX ORDER — LEVOFLOXACIN 500 MG/1
500 TABLET, FILM COATED ORAL EVERY OTHER DAY
Qty: 6 TABLET | Refills: 0 | Status: SHIPPED | OUTPATIENT
Start: 2022-06-27 | End: 2022-07-08

## 2022-06-27 RX ORDER — INSULIN LISPRO 100 [IU]/ML
10 INJECTION, SOLUTION INTRAVENOUS; SUBCUTANEOUS
Status: DISCONTINUED | OUTPATIENT
Start: 2022-06-27 | End: 2022-06-27 | Stop reason: HOSPADM

## 2022-06-27 RX ADMIN — INSULIN LISPRO 10 UNITS: 100 INJECTION, SOLUTION INTRAVENOUS; SUBCUTANEOUS at 12:52

## 2022-06-27 RX ADMIN — LEVOTHYROXINE SODIUM 88 MCG: 88 TABLET ORAL at 04:41

## 2022-06-27 RX ADMIN — HEPARIN SODIUM 5000 UNITS: 5000 INJECTION INTRAVENOUS; SUBCUTANEOUS at 13:08

## 2022-06-27 RX ADMIN — OXYCODONE HYDROCHLORIDE AND ACETAMINOPHEN 500 MG: 500 TABLET ORAL at 12:46

## 2022-06-27 RX ADMIN — DOCUSATE SODIUM 200 MG: 100 CAPSULE, LIQUID FILLED ORAL at 12:47

## 2022-06-27 RX ADMIN — EPOETIN ALFA-EPBX 10000 UNITS: 10000 INJECTION, SOLUTION INTRAVENOUS; SUBCUTANEOUS at 12:58

## 2022-06-27 RX ADMIN — ALLOPURINOL 100 MG: 100 TABLET ORAL at 12:54

## 2022-06-27 RX ADMIN — INSULIN GLARGINE 20 UNITS: 100 INJECTION, SOLUTION SUBCUTANEOUS at 12:53

## 2022-06-27 RX ADMIN — CITALOPRAM HYDROBROMIDE 40 MG: 20 TABLET ORAL at 12:48

## 2022-06-27 RX ADMIN — NEPHROCAP 1 MG: 1 CAP ORAL at 12:47

## 2022-06-27 RX ADMIN — SODIUM BICARBONATE 650 MG: 650 TABLET ORAL at 12:47

## 2022-06-27 RX ADMIN — CALCITRIOL CAPSULES 0.25 MCG 0.5 MCG: 0.25 CAPSULE ORAL at 12:47

## 2022-06-27 RX ADMIN — SODIUM CHLORIDE, PRESERVATIVE FREE 10 ML: 5 INJECTION INTRAVENOUS at 13:00

## 2022-06-27 RX ADMIN — HEPARIN SODIUM 5000 UNITS: 5000 INJECTION INTRAVENOUS; SUBCUTANEOUS at 04:41

## 2022-06-27 RX ADMIN — BUSPIRONE HYDROCHLORIDE 20 MG: 10 TABLET ORAL at 12:46

## 2022-06-27 RX ADMIN — INSULIN LISPRO 4 UNITS: 100 INJECTION, SOLUTION INTRAVENOUS; SUBCUTANEOUS at 12:51

## 2022-06-27 NOTE — PROGRESS NOTES
Nephrology ([address]) Progress Note    Patient:  Lindajean Denver  YOB: 1960  Date of Service: 6/27/2022  MRN: [id number]   Acct: [de-identified]   Primary Care Physician: Roland Doshi MD  Advance Directive: Full Code  Admit Date: 6/25/2022       Hospital Day: 2  Referring Provider: Jhonatan Douglas MD    Patient independently seen and examined, Chart, Consults, Notes, Operative notes, Labs, Cardiology, and Radiology studies reviewed as available. Chief complaint: End-stage renal disease/pyelonephritis. Subjective:  Lindajean Denver is a 58 y.o. female for whom we were consulted for evaluation and treatment of end-stage renal disease. She has type II diabetic nephropathy leading to ESRD and gets dialysis on Monday Wednesday Friday. She has good working fistula. Presented with right renal colic with dysuria, nausea and vomiting. Patient was diagnosed with UTI/pyelonephritis. She had CT scan of the abdomen consistent with right-sided pyelonephritis. Patient is getting IV antibiotics and feeling much better. This morning she feels good, still has mild right renal colic. Currently seen on hemodialysis  Hemodialysis Access: aVF  Hemodialysis: 3-1/2-hour  Ultrafiltration: 3000 cc  2K bath  Blood flow rate is 450 cc/min.     Allergies:  Codeine    Medicines:  Current Facility-Administered Medications   Medication Dose Route Frequency Provider Last Rate Last Admin    insulin glargine (LANTUS) injection vial 20 Units  20 Units SubCUTAneous BID Jhonatan Douglas MD        insulin lispro (HUMALOG) injection vial 10 Units  10 Units SubCUTAneous 4x Daily AC & HS Jhonatan Douglas MD        sodium bicarbonate tablet 650 mg  650 mg Oral 4x Daily Jhonatan Douglas MD   650 mg at 06/26/22 2050    epoetin jose ramon-epbx (RETACRIT) injection 10,000 Units  10,000 Units SubCUTAneous Once per day on Mon Wed Fri Bronson Favre, MD        0.9 % sodium chloride infusion  1,000 mL IntraVENous Continuous Josselyn Courtney MD   Stopped at 06/25/22 2248    sodium chloride flush 0.9 % injection 5-40 mL  5-40 mL IntraVENous 2 times per day Young Ripple, APRN - CNP        sodium chloride flush 0.9 % injection 5-40 mL  5-40 mL IntraVENous PRN Young Ripple, APRN - CNP        0.9 % sodium chloride infusion   IntraVENous PRN Young Ripple, APRN - CNP        ondansetron (ZOFRAN-ODT) disintegrating tablet 4 mg  4 mg Oral Q8H PRN Young Ripple, APRN - CNP        Or    ondansetron (ZOFRAN) injection 4 mg  4 mg IntraVENous Q6H PRN Young Ripple, APRN - CNP        polyethylene glycol (GLYCOLAX) packet 17 g  17 g Oral Daily PRN Young Ripple, APRN - CNP        acetaminophen (TYLENOL) tablet 650 mg  650 mg Oral Q6H PRN Young Ripple, APRN - CNP        Or    acetaminophen (TYLENOL) suppository 650 mg  650 mg Rectal Q6H PRN Young Ripple, APRN - CNP        heparin (porcine) injection 5,000 Units  5,000 Units SubCUTAneous 3 times per day Young Ripple, APRN - CNP   5,000 Units at 06/27/22 0441    allopurinol (ZYLOPRIM) tablet 100 mg  100 mg Oral BID Young Ripple, APRN - CNP   100 mg at 06/26/22 2051    aspirin EC tablet 81 mg  81 mg Oral Daily Young Ripple, APRN - CNP   81 mg at 06/26/22 1717    b complex-C-folic acid (NEPHROCAPS) capsule 1 mg  1 capsule Oral Daily Young Ripple, APRN - CNP   1 mg at 06/26/22 0117    busPIRone (BUSPAR) tablet 20 mg  20 mg Oral BID Young Ripple, APRN - CNP   20 mg at 06/26/22 2050    citalopram (CELEXA) tablet 40 mg  40 mg Oral Daily Young Ripple, APRN - CNP   40 mg at 06/26/22 0821    fluticasone (FLONASE) 50 MCG/ACT nasal spray 2 spray  2 spray Nasal Daily PRN Young Ripple, APRN - CNP        levothyroxine (SYNTHROID) tablet 88 mcg  88 mcg Oral Daily Young Ripple, APRN - CNP   88 mcg at 06/27/22 0441    rosuvastatin (CRESTOR) tablet 20 mg  20 mg Oral Nightly Young Ripple, APRN - CNP   20 mg at 06/26/22 2051    levoFLOXacin (LEVAQUIN) 500 MG/100ML infusion 500 mg  500 mg IntraVENous Q48H Pheobe Radish, APRN - CNP        glucose chewable tablet 16 g  4 tablet Oral PRN Pheobe Radish, APRN - CNP        dextrose bolus 10% 125 mL  125 mL IntraVENous PRN Pheobe Radish, APRN - CNP        Or    dextrose bolus 10% 250 mL  250 mL IntraVENous PRN Pheobe Radish, APRN - CNP        glucagon (rDNA) injection 1 mg  1 mg IntraMUSCular PRN Pheobe Radish, APRN - CNP        dextrose 5 % solution  100 mL/hr IntraVENous PRN Pheobe Radish, APRN - CNP        HYDROmorphone HCl PF (DILAUDID) injection 0.5 mg  0.5 mg IntraVENous Q4H PRN Pheobe Radish, APRN - CNP        calcitRIOL (ROCALTROL) capsule 0.5 mcg  0.5 mcg Oral Daily Pheobe Radish, APRN - CNP   0.5 mcg at 06/26/22 0990    carvedilol (COREG) tablet 6.25 mg  6.25 mg Oral BID WC Pheobe Radish, APRN - CNP   6.25 mg at 06/26/22 1717    docusate sodium (COLACE) capsule 200 mg  200 mg Oral TID Pheobe Radish, APRN - CNP   200 mg at 06/26/22 2051    ascorbic acid (VITAMIN C) tablet 500 mg  500 mg Oral Daily Pheobe Radish, APRN - CNP   500 mg at 06/26/22 3315    insulin lispro (HUMALOG) injection vial 0-12 Units  0-12 Units SubCUTAneous TID WC Pheobe Radish, APRN - CNP   8 Units at 06/26/22 1728    insulin lispro (HUMALOG) injection vial 0-6 Units  0-6 Units SubCUTAneous Nightly Pheobe Radish, APRN - CNP   5 Units at 06/26/22 2051       Past Medical History:  Past Medical History:   Diagnosis Date    Anxiety     Arthritis     CKD (chronic kidney disease)     stage 4    Colon polyp     Depression     Embolism - blood clot 2004    Hemodialysis patient ([facility]     dialysis mon - friday x 2 weeks.     Hx of blood clots     Hyperlipidemia     Hypertension     Obesity     Sleep apnea     Thyroid disease     Type II or unspecified type diabetes mellitus without mention of complication, not stated as uncontrolled        Past Surgical History:  Past Surgical History:   Procedure Laterality Date    CHOLECYSTECTOMY      DIALYSIS CATHETER INSERTION N/A 8/11/2021 Paying Living Expenses: Not on file   Food Insecurity:     Worried About Running Out of Food in the Last Year: Not on file    Ran Out of Food in the Last Year: Not on file   Transportation Needs:     Lack of Transportation (Medical): Not on file    Lack of Transportation (Non-Medical):  Not on file   Physical Activity:     Days of Exercise per Week: Not on file    Minutes of Exercise per Session: Not on file   Stress:     Feeling of Stress : Not on file   Social Connections:     Frequency of Communication with Friends and Family: Not on file    Frequency of Social Gatherings with Friends and Family: Not on file    Attends Muslim Services: Not on file    Active Member of Clubs or Organizations: Not on file    Attends Club or Organization Meetings: Not on file    Marital Status: Not on file   Intimate Partner Violence:     Fear of Current or Ex-Partner: Not on file    Emotionally Abused: Not on file    Physically Abused: Not on file    Sexually Abused: Not on file   Housing Stability:     Unable to Pay for Housing in the Last Year: Not on file    Number of Places Lived in the Last Year: Not on file    Unstable Housing in the Last Year: Not on file         Review of Systems:  History obtained from chart review and the patient  General ROS: No fever or chills  Respiratory ROS: No cough, shortness of breath, wheezing  Cardiovascular ROS: No chest pain or palpitations  Gastrointestinal ROS: No abdominal pain or melena  Genito-Urinary ROS: No dysuria or hematuria  Musculoskeletal ROS: No joint pain or swelling         Objective:  Patient Vitals for the past 24 hrs:   BP Temp Temp src Pulse Resp SpO2   06/27/22 0447 (!) 125/51 97.4 °F (36.3 °C) Temporal 71 16 100 %   06/26/22 2002 (!) 116/55 97.7 °F (36.5 °C) Temporal 70 18 99 %   06/26/22 1707 (!) 123/48 97.3 °F (36.3 °C) Temporal 65 18 100 %       Intake/Output Summary (Last 24 hours) at 6/27/2022 0908  Last data filed at 6/26/2022 1800  Gross per 24 hour   Intake 480 ml   Output --   Net 480 ml     General: awake/alert   HEENT: Normocephalic atraumatic head  Chest:  clear to auscultation bilaterally  CVS: regular rate and rhythm  Abdominal: soft, nontender, normal bowel sounds  Extremities: no cyanosis or edema  Skin: warm and dry without rash    Labs:  BMP:   Recent Labs     06/25/22  1423 06/26/22  0805 06/27/22  0619   * 133* 135*   K 3.8 3.4* 3.8   CL 89* 91* 90*   CO2 21* 19* 20*   BUN 77* 81* 96*   CREATININE 8.5* 8.5* 9.3*   CALCIUM 9.6 9.2 9.5     CBC:   Recent Labs     06/25/22  1423 06/26/22  0805 06/27/22  0619   WBC 16.2* 13.4* 11.8*   HGB 8.9* 8.1* 8.3*   HCT 27.3* 25.9* 26.6*   .8* 112.6* 111.8*    212 207     LIVER PROFILE:   Recent Labs     06/25/22  1423   AST 17   ALT 8   LIPASE 39   BILITOT 0.3   ALKPHOS 89     PT/INR: No results for input(s): PROTIME, INR in the last 72 hours. APTT: No results for input(s): APTT in the last 72 hours. BNP:  No results for input(s): BNP in the last 72 hours. Ionized Calcium:No results for input(s): IONCA in the last 72 hours. Magnesium:  Recent Labs     06/26/22  0805   MG 2.2     Phosphorus:No results for input(s): PHOS in the last 72 hours. HgbA1C:   Recent Labs     06/25/22  1423   LABA1C 9.4*     Hepatic:   Recent Labs     06/25/22  1423   ALKPHOS 89   ALT 8   AST 17   PROT 7.7   BILITOT 0.3   LABALBU 3.8     Lactic Acid: No results for input(s): LACTA in the last 72 hours. Troponin: No results for input(s): CKTOTAL, CKMB, TROPONINT in the last 72 hours. ABGs: No results found for: PHART, PO2ART, VED7VDP  CRP:  No results for input(s): CRP in the last 72 hours. Sed Rate:  No results for input(s): SEDRATE in the last 72 hours. Culture Results:   Blood Culture Recent: No results for input(s): BC in the last 720 hours. Urine Culture Recent :   Recent Labs     06/25/22  1423   LABURIN >50,000 CFU/ml mixed skin/urogenital humberto.  No further workup       Radiology reports as per the Radiologist  Radiology: CT ABDOMEN PELVIS WO CONTRAST Additional Contrast? None    Result Date: 6/25/2022  Exam: CT OF THE ABDOMEN/PELVIS WITHOUT CONTRAST Clinical data: Right flank pain, elevated white blood cell count, history of hemodialysis. Technique: Direct contiguous axial CT images were acquired through the abdomen and pelvis without contrast using soft tissue and bone algorithms. Reformatted/MPR images were performed. Radiation dose: CTDIvol =mGy, DLP =mGy x cm. Limitations: Lack of intravenous contrast limits evaluation of solid viscera. Lack of oral contrast limits evaluation of the bowel loops. Prior Studies: No prior studies submitted. Findings: Lung bases: Grossly clear. Liver:Unremarkable size, contour, and density. No evidence of mass. No evidence of dilated ducts. Gallbladder:Status post cholecystectomy. Spleen: Grossly unremarkable. Pancreas/adrenal glands: Grossly unremarkable size, contour and density. Kidneys: In anatomic position. Cortical atrophy of the kidneys. Grossly unremarkablerenal size, contour and density. No renal or ureteral calculi. No hydronephrosis. Perinephric space is unremarkable. Retroperitoneum: No retroperitoneal lymphadenopathy. Atherosclerosis of the abdominal aorta. The IVC is grossly unremarkable. IVC filter. Peritoneal cavity: Stranding/inflammation of the mesentery and retroperitoneum. No evidence of free air or ascites. Surgical changes anterior abdomen. Gastrointestinal tract: Nondistended small bowel and colon. No evidence of obstruction. Appendix: Unremarkable. Pelvis: Solid and hollow viscera grossly unremarkable. Bladder is moderately distended. Status post hysterectomy. Osseous structures: No acute or destructive bony process identified. 1.  Cortical thinning of the kidneys with perinephric inflammation. 2.  Inflammation of the mesentery throughout the abdomen and pelvis. 3.  Status post cholecystectomy and status post hysterectomy. 4.  IVC filter. Recommendation: Follow up as clinically indicated. All CT scans at this facility utilize dose modulation, iterative reconstruction, and/or weight based dosing when appropriate to reduce radiation dose to as low as reasonably achievable. Assessment   1. End-stage renal disease/currently seen on dialysis. 2.  Type II diabetic nephropathy. 3.  Acute pyelonephritis. 4.  Anemia of chronic kidney disease. 5.  Metabolic acidemia. 6.  Hyponatremia. Plan:  1. Tolerating dialysis well. 2.  Continue IV antibiotics. 3.  Continue Retacrit.     Patito Slater MD  06/27/22  9:08 AM

## 2022-06-27 NOTE — DISCHARGE SUMMARY
Discharge Summary      Date:6/27/2022        Patient Name:Sara Lomeli     YOB: 1960     Age:61 y.o. Admit Date:6/25/2022   Admission Condition:fair   Discharged Condition:stable  Discharge Date: 06/27/22       Discharge Diagnoses   Principal Problem:    Pyelonephritis  Active Problems:    Thyroid disease    Sleep apnea    Vitamin D deficiency    ESRD on dialysis ([facility]    Diabetes ([facility]    Hypertension    Hypoxia  Resolved Problems:    * No resolved hospital problems. [facility] Stay   Narrative of Hospital Course:     58 y. o. female with history of depression, HTN, diabetes, hyperlipidemia, hypothyroidism, obesity, ESRD on home hemodialysis who presented to [facility] ED complaining of vomiting and flank pain. In ED, pt with oxygen saturation of 80% requiring 2L of oxygen per nasal prongs.  CT abd/pelvis-cortical thinning of the kidneys with perinephric inflammation. GI molecular panel negative. Pt is admitted to hospitalist service for pyelonephritis. Was given ceftriaxone in the ED, patient transition to [address] [place] [address]. Tolerated Levaquin well, right flank pain resolved and patient with overall improvement in clinical status. Will discharge home to finish course of Levaquin outpatient and to follow-up with PCP for continued management of chronic medical problems. Physical Examination:  General: Well-developed, no acute distress lying comfortably in bed. HEENT: Atraumatic normocephalic, range of motion normal, no JVD, no tracheal deviation noted. Cardiac: Normal S1-S2   Respiratory: clear To auscultation bilaterally, no rhonchi or rales, no wheezing  Abdomen: Soft, positive bowel sounds in all quadrants, no distention, nontender to palpation, no organomegaly noted, rebound noted.   : no flank pain  Extremities: no tenderness, no edema, moves all extremities  Psych: Affect normal and good eye contact, behavioral normal.         Consultants:   IP CONSULT TO NEPHROLOGY  PHARMACY TO DOSE MEDICATION    Time Spent on Discharge:  35 minutes were spent in patient examination, evaluation, counseling as well as medication reconciliation, prescriptions for required medications, discharge plan and follow up. Surgeries/Procedures Performed:  NONE    Significant Diagnostic Studies:   Recent Labs:  CBC:   Lab Results   Component Value Date    WBC 11.8 06/27/2022    RBC 2.38 06/27/2022    HGB 8.3 06/27/2022    HCT 26.6 06/27/2022    HCT 30.9 05/23/2012    .8 06/27/2022    MCH 34.9 06/27/2022    MCHC 31.2 06/27/2022    RDW 17.5 06/27/2022     06/27/2022     05/23/2012     BMP:    Lab Results   Component Value Date    GLUCOSE 309 06/27/2022     06/27/2022     05/23/2012    K 3.8 06/27/2022    K 4.4 05/23/2012    CL 90 06/27/2022     05/23/2012    CO2 20 06/27/2022    ANIONGAP 25 06/27/2022    BUN 96 06/27/2022    CREATININE 9.3 06/27/2022    CREATININE 1.9 05/23/2012    CALCIUM 9.5 06/27/2022    LABGLOM 4 06/27/2022    GFRAA 5 06/27/2022       Radiology Last 7 Days:  CT ABDOMEN PELVIS WO CONTRAST Additional Contrast? None    Result Date: 6/25/2022  1. Cortical thinning of the kidneys with perinephric inflammation. 2.  Inflammation of the mesentery throughout the abdomen and pelvis. 3.  Status post cholecystectomy and status post hysterectomy. 4.  IVC filter. Recommendation: Follow up as clinically indicated. All CT scans at this facility utilize dose modulation, iterative reconstruction, and/or weight based dosing when appropriate to reduce radiation dose to as low as reasonably achievable.       Discharge Plan   Disposition: Home    Provider Follow-Up:   Parag Stallworth MD  [facility] [address] [place] [id number] [phone]    Go on 7/6/2022  @ 10:30 am       Patient Instructions   Diet: diabetic diet and renal diet    Activity: activity as tolerated      Discharge Medications         Medication List      START taking these medications    levoFLOXacin 500 MG tablet  Commonly known as: LEVAQUIN  Take 1 tablet by mouth every other day for 11 days     sodium bicarbonate 650 MG tablet  Take 1 tablet by mouth 3 times daily        CONTINUE taking these medications    allopurinol 100 MG tablet  Commonly known as: ZYLOPRIM     aspirin 81 MG tablet     Auryxia 1  MG(Fe) Tabs tablet  Generic drug: ferric citrate     b complex-C-folic acid 1 MG capsule  Take 1 capsule by mouth daily     B-D ULTRAFINE III SHORT PEN 31G X 8 MM Misc  Generic drug: Insulin Pen Needle     BD Insulin Syringe U/F 31G X 5/16\" 1 ML Misc  Generic drug: Insulin Syringe-Needle U-100     busPIRone 10 MG tablet  Commonly known as: BUSPAR     calcitRIOL 0.5 MCG capsule  Commonly known as: ROCALTROL     carvedilol 6.25 MG tablet  Commonly known as: COREG     cetirizine 10 MG tablet  Commonly known as: ZYRTEC     citalopram 40 MG tablet  Commonly known as: CELEXA     diazePAM 2 MG tablet  Commonly known as: VALIUM     docusate sodium 100 MG capsule  Commonly known as: COLACE     epoetin jose ramon [address]87 UNIT/ML injection  Commonly known as: EPOGEN;PROCRIT     fluticasone 50 MCG/ACT nasal spray  Commonly known as: FLONASE     FreeStyle Lancets Misc     HUMULIN R U-500 KWIKPEN SC     lamoTRIgine 100 MG tablet  Commonly known as: LAMICTAL     levothyroxine 88 MCG tablet  Commonly known as: SYNTHROID     potassium chloride 20 MEQ packet  Commonly known as: KLOR-CON     REPATHA SC     rosuvastatin 20 MG tablet  Commonly known as: CRESTOR     Trulicity 3 QN/7.6GW Sopn  Generic drug: Dulaglutide     vitamin C 500 MG tablet  Commonly known as: ASCORBIC ACID        STOP taking these medications    fish oil 1000 MG Caps     HumaLOG KwikPen 200 UNIT/ML Sopn pen  Generic drug: insulin lispro     LANTUS SC           Where to Get Your Medications      These medications were sent to [facility], [place] [address] [address] [facility] [phone]  [address] [place] [address] [address], [address] [facility] [place] [address]    Phone: [phone]   · levoFLOXacin 500 MG tablet  · sodium bicarbonate 650 MG tablet         Electronically signed by Sarah Key MD on 6/27/22 at 12:08 PM CDT

## 2022-06-27 NOTE — DISCHARGE INSTR - DIET
Good nutrition is important when healing from an illness, injury, or surgery. Follow any nutrition recommendations given to you during your hospital stay. If you were given an oral nutrition supplement while in the hospital, continue to take this supplement at home. You can take it with meals, in-between meals, and/or before bedtime. These supplements can be purchased at most local grocery stores, pharmacies, and [employer]-stores. If you have any questions about your diet or nutrition, call the hospital and ask for the dietitian. ADULT DIET; Regular; 4 carb choices (60 gm/meal); Low Fat/Low Chol/High Fiber/HECTOR; Low Potassium (Less than 3000 mg/day);  Low Phosphorus (Less than 1000 mg)

## 2022-06-27 NOTE — PROGRESS NOTES
Patient being discharged home per order. Patient verbalized understanding of all discharge of all discharge instructions. Patient has no complaints or concerns at this time.

## 2022-06-28 ENCOUNTER — CARE COORDINATION (OUTPATIENT)
Dept: CASE MANAGEMENT | Age: 62
End: 2022-06-28

## 2022-06-28 NOTE — CARE COORDINATION
[facility] Transitions Initial Follow Up Call    Call within 2 business days of discharge: Yes    Patient: Rachael Veras Patient : 1960   MRN: [id number]  Reason for Admission:   Discharge Date: 22 RARS: Readmission Risk Score: 24.5 ( )      Last Discharge [address] [place] [address]       Complaint Diagnosis Description Type Department Provider    22 Emesis; Flank Pain Non-intractable vomiting with nausea, unspecified vomiting type . .. ED to Hosp-Admission (Discharged) (ADMITTED) [facility] 5 SURG Paxton Lee MD; Julia Evans. .. Sp[facility] with: [address]: [facility]    Non-face-to-face services provided:  Obtained and reviewed discharge summary and/or continuity of care documents Reviewed encounter information for continuity of care prior to follow up phone call - chart notes, consults, progress notes, test results, med list, appointments, AVS, other information. Advance Care Planning   Healthcare Decision Maker:    Primary Decision Maker: Jin Mary spouse - [phone]    Transitions of Care Initial Call    Was this an external facility discharge? No Discharge Facility:     Challenges to be reviewed by the provider   Additional needs identified to be addressed with provider: No  none             Method of communication with provider : none    Advance Care Planning:   Does patient have an Advance Directive: not on file; education provided. Care Transition Nurse contacted the patient by telephone to perform post hospital discharge assessment. Verified name and  with patient as identifiers. Provided introduction to self, and explanation of the CTN role. CTN reviewed discharge instructions, medical action plan and red flags with patient who verbalized understanding. Patient given an opportunity to ask questions and does not have any further questions or concerns at this time. Were discharge instructions available to patient?  No. Reviewed appropriate site of care based on symptoms and resources available to patient including: PCP  Specialist. The patient agrees to contact the PCP office for questions related to their healthcare. Medication reconciliation was performed with patient, who verbalizes understanding of administration of home medications. Advised obtaining a 90-day supply of all daily and as-needed medications. Was patient discharged with a pulse oximeter? no    CTN provided contact information. No further follow-up call indicated based on severity of symptoms and risk factors. Care Transitions 24 Hour Call    Do you have a copy of your discharge instructions?: Yes  Do you have all of your prescriptions and are they filled?: No  Have you been contacted by a [address]?: No  Have you scheduled your follow up appointment?: Yes  How are you going to get to your appointment?: Car - family or friend to transport  Do you have support at home?: Partner/Spouse/SO  Do you feel like you have everything you need to keep you well at home?: No  Care Transitions Interventions         Follow Up : Spoke with patient today for CT call after discharge from [facility]. She was asleep and wearing her CPAP, said she had some difficulty going to sleep last night. She says she took a Xanax, slept better. She has her meds, did review. Has her HFU with PCP on 7/6 and is aware. She does HD at home 5 days a week, 2 days off. She says appetite is good, has not checked blood sugar this morning as she just woke. She is going to get Omnipod training next week she says, and hopefully this will make it easier to keep her blood sugars down. Her A1C was 9.4 when checked inpatient. She says she is insulin resistant, and it is hard to keep her blood sugars in check. She follows with Endocrinology. She has not had nausea or vomiting since getting home. She is aware of good eating habits, hydration, and exercise.  She has restarted her Humalog 500 since getting home, says hospital could not get it as it was not on formulary for them. She has had the Covid vaccine, has PHCDM listed and does not have LW. She is following with non[facility] PCP in a few days, no further outreach scheduled at this time. No future appointments.     Javid Singleton RN

## 2022-06-30 ENCOUNTER — DOCUMENTATION (OUTPATIENT)
Dept: ENDOCRINOLOGY | Facility: CLINIC | Age: 62
End: 2022-06-30

## 2022-06-30 NOTE — PROGRESS NOTES
PA for Humulin R u500 is submitted via [facility]. Key [id number].

## 2022-07-01 LAB
BLOOD CULTURE, ROUTINE: NORMAL
CULTURE, BLOOD 2: NORMAL

## 2022-07-11 DIAGNOSIS — F41.8 MIXED ANXIETY AND DEPRESSIVE DISORDER: ICD-10-CM

## 2022-07-11 RX ORDER — BUSPIRONE HYDROCHLORIDE 10 MG/1
10 TABLET ORAL 3 TIMES DAILY
Qty: 180 TABLET | Refills: 0 | Status: SHIPPED | OUTPATIENT
Start: 2022-07-11 | End: 2022-08-11 | Stop reason: SDUPTHER

## 2022-07-11 NOTE — TELEPHONE ENCOUNTER
Caller: Maria C Shrestha    Relationship: Self    Best call back number [phone]    Requested Prescriptions:   Requested Prescriptions     Pending Prescriptions Disp Refills   • busPIRone (BUSPAR) 10 MG tablet 180 tablet 0        Pharmacy where request should be sent: [facility] - [address] - [phone] PH - [phone] FX     Additional details provided by patient: PATIENT REQUESTING A ONE MONTH SUPPLY BE SENT TO [facility] AND THEN HAVE THE REFILLS SENT TO [facility] IF POSSIBLE     Does the patient have less than a 3 day supply:  [x] Yes  [] No    Sanjuana Lopes, Osvaldo Rep   07/11/22 11:07 CDT

## 2022-07-13 DIAGNOSIS — F41.8 MIXED ANXIETY AND DEPRESSIVE DISORDER: ICD-10-CM

## 2022-07-14 RX ORDER — BUSPIRONE HYDROCHLORIDE 10 MG/1
TABLET ORAL
Qty: 180 TABLET | Refills: 7 | OUTPATIENT
Start: 2022-07-14

## 2022-07-26 ENCOUNTER — DOCUMENTATION (OUTPATIENT)
Dept: ENDOCRINOLOGY | Facility: CLINIC | Age: 62
End: 2022-07-26

## 2022-07-26 NOTE — PROGRESS NOTES
ORDER FOR OMNIPOD 5 (GEN 5) KIT FAXED TO [facility] @ [phone]    CONFIRMATION RECEIVED  SENT TO MED REC

## 2022-08-08 DIAGNOSIS — F41.8 MIXED ANXIETY AND DEPRESSIVE DISORDER: ICD-10-CM

## 2022-08-08 DIAGNOSIS — E78.1 HYPERTRIGLYCERIDEMIA: ICD-10-CM

## 2022-08-08 RX ORDER — ROSUVASTATIN CALCIUM 20 MG/1
TABLET, COATED ORAL
Qty: 90 TABLET | Refills: 3 | OUTPATIENT
Start: 2022-08-08

## 2022-08-08 RX ORDER — CITALOPRAM 40 MG/1
TABLET ORAL
Qty: 90 TABLET | Refills: 3 | OUTPATIENT
Start: 2022-08-08

## 2022-08-11 ENCOUNTER — OFFICE VISIT (OUTPATIENT)
Dept: FAMILY MEDICINE CLINIC | Facility: CLINIC | Age: 62
End: 2022-08-11

## 2022-08-11 VITALS
WEIGHT: 293 LBS | SYSTOLIC BLOOD PRESSURE: 156 MMHG | HEIGHT: 65 IN | HEART RATE: 71 BPM | BODY MASS INDEX: 48.82 KG/M2 | OXYGEN SATURATION: 98 % | DIASTOLIC BLOOD PRESSURE: 82 MMHG

## 2022-08-11 DIAGNOSIS — F41.8 MIXED ANXIETY AND DEPRESSIVE DISORDER: ICD-10-CM

## 2022-08-11 DIAGNOSIS — K21.9 GASTROESOPHAGEAL REFLUX DISEASE WITHOUT ESOPHAGITIS: Primary | ICD-10-CM

## 2022-08-11 DIAGNOSIS — F41.9 ANXIETY: ICD-10-CM

## 2022-08-11 PROCEDURE — 99214 OFFICE O/P EST MOD 30 MIN: CPT | Performed by: NURSE PRACTITIONER

## 2022-08-11 RX ORDER — CITALOPRAM 40 MG/1
40 TABLET ORAL DAILY
Qty: 30 TABLET | Refills: 1 | Status: SHIPPED | OUTPATIENT
Start: 2022-08-11 | End: 2023-02-27

## 2022-08-11 RX ORDER — BUPROPION HYDROCHLORIDE 100 MG/1
100 TABLET, EXTENDED RELEASE ORAL 2 TIMES DAILY
Qty: 60 TABLET | Refills: 1 | Status: SHIPPED | OUTPATIENT
Start: 2022-08-11 | End: 2022-09-12 | Stop reason: SDUPTHER

## 2022-08-11 RX ORDER — LAMOTRIGINE 100 MG/1
100 TABLET ORAL DAILY
Qty: 30 TABLET | Refills: 1 | Status: SHIPPED | OUTPATIENT
Start: 2022-08-11 | End: 2022-09-21 | Stop reason: SDUPTHER

## 2022-08-11 RX ORDER — PANTOPRAZOLE SODIUM 40 MG/1
40 TABLET, DELAYED RELEASE ORAL DAILY
Qty: 30 TABLET | Refills: 5 | Status: ON HOLD | OUTPATIENT
Start: 2022-08-11 | End: 2022-11-28

## 2022-08-11 RX ORDER — BUSPIRONE HYDROCHLORIDE 10 MG/1
20 TABLET ORAL 2 TIMES DAILY
Qty: 180 TABLET | Refills: 0 | Status: SHIPPED | OUTPATIENT
Start: 2022-08-11 | End: 2022-09-12 | Stop reason: SDUPTHER

## 2022-08-11 NOTE — PROGRESS NOTES
"Chief Complaint  Mood Disorder and Thyroid Problem    Subjective    History of Present Illness      Patient presents to [facility] for   Patient is here today for medication refills. She is requesting a one month supply today. Changing insurance on Sept 1st and would like the rest of her refills when that is on file.     Thyroid Problem         Review of Systems    I have reviewed and agree with the HPI information as above.  Janet Richardson, APRN     Objective   Vital Signs:   /82   Pulse 71   Ht 165.1 cm (65\")   Wt (!) 150 kg (331 lb)   SpO2 98%   BMI 55.08 kg/m²     Class 3 Severe Obesity (BMI >=40). Obesity-related health conditions include the following: obstructive sleep apnea, hypertension, diabetes mellitus, dyslipidemias and osteoarthritis. Obesity is improving with lifestyle modifications. BMI is is above average; BMI management plan is completed. We discussed portion control and increasing exercise.      Physical Exam  Vitals and nursing note reviewed.   Constitutional:       Appearance: Normal appearance. She is well-developed. She is obese.   HENT:      Head: Normocephalic and atraumatic.      Right Ear: Tympanic membrane, ear canal and external ear normal.      Left Ear: Tympanic membrane, ear canal and external ear normal.      Nose: Nose normal. No septal deviation, nasal tenderness or congestion.      Mouth/Throat:      Lips: Pink. No lesions.      Mouth: Mucous membranes are moist. No oral lesions.      Dentition: Normal dentition.      Pharynx: Oropharynx is clear. No pharyngeal swelling, oropharyngeal exudate or posterior oropharyngeal erythema.   Eyes:      General: Lids are normal. Vision grossly intact. No scleral icterus.        Right eye: No discharge.         Left eye: No discharge.      Extraocular Movements: Extraocular movements intact.      Conjunctiva/sclera: Conjunctivae normal.      Right eye: Right conjunctiva is not injected.      Left " eye: Left conjunctiva is not injected.      Pupils: Pupils are equal, round, and reactive to light.   Neck:      Thyroid: No thyroid mass.      Trachea: Trachea normal.   Cardiovascular:      Rate and Rhythm: Normal rate and regular rhythm.      Heart sounds: Normal heart sounds. No murmur heard.    No gallop.   Pulmonary:      Effort: Pulmonary effort is normal.      Breath sounds: Normal breath sounds and air entry. No wheezing, rhonchi or rales.   Musculoskeletal:         General: No tenderness or deformity. Normal range of motion.      Cervical back: Full passive range of motion without pain, normal range of motion and neck supple.      Thoracic back: Normal.      Right lower leg: No edema.      Left lower leg: No edema.   Skin:     General: Skin is warm and dry.      Coloration: Skin is not jaundiced.      Findings: No rash.   Neurological:      Mental Status: She is alert and oriented to person, place, and time.      Cranial Nerves: Cranial nerves are intact.      Sensory: Sensation is intact.      Motor: Motor function is intact.      Coordination: Coordination is intact.      Gait: Gait is intact.      Deep Tendon Reflexes: Reflexes are normal and symmetric.   Psychiatric:         Mood and Affect: Mood and affect normal.         Behavior: Behavior normal.         Judgment: Judgment normal.             Result Review  Data Reviewed:                   Assessment and Plan      Problem List Items Addressed This Visit        Mental Health    Anxiety    Relevant Medications    buPROPion SR (Wellbutrin SR) 100 MG 12 hr tablet      Other Visit Diagnoses     Gastroesophageal reflux disease without esophagitis    -  Primary    Relevant Medications    pantoprazole (Protonix) 40 MG EC tablet    Mixed anxiety and depressive disorder        Relevant Medications    busPIRone (BUSPAR) 10 MG tablet    buPROPion SR (Wellbutrin SR) 100 MG 12 hr tablet    citalopram (CeleXA) 40 MG tablet    lamoTRIgine (LaMICtal) 100 MG tablet         Patient is here today to follow-up on her medications.  She states that she is experiencing some heartburn and she would like to be started on some type of heartburn medication.  She is also experiencing more stress at work and home life.  We will increase lamotrigine to 100 mg at night and also add on Wellbutrin 100 mg twice daily.  Patient is only needing a month worth of medication at this time as her insurance is changing next week, she will call and let us know when to send the rest of her refills then.  Plan  1.  Start Wellbutrin 100 mg twice a day  2.  Increase lamotrigine to 100 mg  3.  Start Protonix 40      Follow Up   Return in about 3 months (around 11/11/2022) for Recheck.  Patient was given instructions and counseling regarding her condition or for health maintenance advice. Please see specific information pulled into the AVS if appropriate.

## 2022-08-15 ENCOUNTER — DOCUMENTATION (OUTPATIENT)
Dept: ENDOCRINOLOGY | Facility: CLINIC | Age: 62
End: 2022-08-15

## 2022-08-15 NOTE — PROGRESS NOTES
JOJO FOR CGM FAXED TO [facility] @ [phone]    CONFIRMATION RECEIVED  SENT TO MED REC

## 2022-08-15 NOTE — PROGRESS NOTES
CHART NOTES FAXED TO [facility] @ [phone]    CONFIRMATION RECEIVED  SENT TO MED REC

## 2022-08-31 DIAGNOSIS — E78.1 HYPERTRIGLYCERIDEMIA: ICD-10-CM

## 2022-08-31 RX ORDER — ROSUVASTATIN CALCIUM 20 MG/1
20 TABLET, COATED ORAL NIGHTLY
Qty: 90 TABLET | Refills: 1 | Status: SHIPPED | OUTPATIENT
Start: 2022-08-31 | End: 2022-09-12 | Stop reason: SDUPTHER

## 2022-08-31 NOTE — TELEPHONE ENCOUNTER
Caller: Maria C Shrestha GODFREY    Relationship: Self    Best call back number: [phone]    Requested Prescriptions:   Requested Prescriptions     Pending Prescriptions Disp Refills   • rosuvastatin (CRESTOR) 20 MG tablet 90 tablet 1     Sig: Take 1 tablet by mouth Every Night.        Pharmacy where request should be sent: [facility] - [address] - [phone] PH - [phone] FX       Additional details provided by patient:   PATIENT IS LOW ON MEDICATION    Does the patient have less than a 3 day supply:  [x] Yes  [] No    Christina Zuleta, PCT   08/31/22 09:35 CDT

## 2022-08-31 NOTE — TELEPHONE ENCOUNTER
Patient seen 8-11-22 and ok'd for 3 months per FLOR Rodriguez. Called patient and informed medication was sent to pharmacy. JOSEPH

## 2022-09-08 ENCOUNTER — TELEPHONE (OUTPATIENT)
Dept: FAMILY MEDICINE CLINIC | Facility: CLINIC | Age: 62
End: 2022-09-08

## 2022-09-08 DIAGNOSIS — N18.30 TYPE 2 DIABETES MELLITUS WITH STAGE 3 CHRONIC KIDNEY DISEASE, WITH LONG-TERM CURRENT USE OF INSULIN, UNSPECIFIED WHETHER STAGE 3A OR 3B CKD: ICD-10-CM

## 2022-09-08 DIAGNOSIS — Z12.31 BREAST CANCER SCREENING BY MAMMOGRAM: Primary | ICD-10-CM

## 2022-09-08 DIAGNOSIS — Z79.4 TYPE 2 DIABETES MELLITUS WITH STAGE 3 CHRONIC KIDNEY DISEASE, WITH LONG-TERM CURRENT USE OF INSULIN, UNSPECIFIED WHETHER STAGE 3A OR 3B CKD: ICD-10-CM

## 2022-09-08 DIAGNOSIS — Z78.0 POST-MENOPAUSAL: ICD-10-CM

## 2022-09-08 DIAGNOSIS — E11.22 TYPE 2 DIABETES MELLITUS WITH STAGE 3 CHRONIC KIDNEY DISEASE, WITH LONG-TERM CURRENT USE OF INSULIN, UNSPECIFIED WHETHER STAGE 3A OR 3B CKD: ICD-10-CM

## 2022-09-08 NOTE — TELEPHONE ENCOUNTER
Pt contacted office and spoke with Referral Staff Coordinator, Jennifer SAHA Pt requested order for Mammogram, OBGYN referral and opthalmology referral to [facility] per insurance requirements. Placed orders for all per Dr. Soliman.

## 2022-09-11 ENCOUNTER — APPOINTMENT (OUTPATIENT)
Dept: CT IMAGING | Age: 62
End: 2022-09-11
Payer: MEDICARE

## 2022-09-11 ENCOUNTER — HOSPITAL ENCOUNTER (EMERGENCY)
Age: 62
Discharge: HOME | End: 2022-09-11
Attending: EMERGENCY MEDICINE
Payer: MEDICARE

## 2022-09-11 VITALS
OXYGEN SATURATION: 90 % | RESPIRATION RATE: 18 BRPM | TEMPERATURE: 98 F | SYSTOLIC BLOOD PRESSURE: 124 MMHG | WEIGHT: 293 LBS | HEIGHT: 63 IN | DIASTOLIC BLOOD PRESSURE: 60 MMHG | HEART RATE: 87 BPM | BODY MASS INDEX: 51.91 KG/M2

## 2022-09-11 DIAGNOSIS — N18.6 END STAGE RENAL DISEASE ON DIALYSIS (HCC): ICD-10-CM

## 2022-09-11 DIAGNOSIS — R11.0 NAUSEA: Primary | ICD-10-CM

## 2022-09-11 DIAGNOSIS — R42 DIZZINESS: ICD-10-CM

## 2022-09-11 DIAGNOSIS — Z99.2 END STAGE RENAL DISEASE ON DIALYSIS (HCC): ICD-10-CM

## 2022-09-11 DIAGNOSIS — N39.0 URINARY TRACT INFECTION WITH HEMATURIA, SITE UNSPECIFIED: ICD-10-CM

## 2022-09-11 DIAGNOSIS — R31.9 URINARY TRACT INFECTION WITH HEMATURIA, SITE UNSPECIFIED: ICD-10-CM

## 2022-09-11 LAB
ALBUMIN SERPL-MCNC: 4.4 G/DL (ref 3.5–5.2)
ALP BLD-CCNC: 99 U/L (ref 35–104)
ALT SERPL-CCNC: 10 U/L (ref 5–33)
ANION GAP SERPL CALCULATED.3IONS-SCNC: 19 MMOL/L (ref 7–19)
AST SERPL-CCNC: 11 U/L (ref 5–32)
BACTERIA: ABNORMAL /HPF
BASOPHILS ABSOLUTE: 0.1 K/UL (ref 0–0.2)
BASOPHILS RELATIVE PERCENT: 0.5 % (ref 0–1)
BILIRUB SERPL-MCNC: 0.4 MG/DL (ref 0.2–1.2)
BILIRUBIN URINE: ABNORMAL
BLOOD, URINE: ABNORMAL
BUN BLDV-MCNC: 63 MG/DL (ref 8–23)
CALCIUM SERPL-MCNC: 10.6 MG/DL (ref 8.8–10.2)
CHLORIDE BLD-SCNC: 87 MMOL/L (ref 98–111)
CLARITY: ABNORMAL
CO2: 27 MMOL/L (ref 22–29)
COLOR: ABNORMAL
CREAT SERPL-MCNC: 6.5 MG/DL (ref 0.5–0.9)
EOSINOPHILS ABSOLUTE: 0.2 K/UL (ref 0–0.6)
EOSINOPHILS RELATIVE PERCENT: 0.8 % (ref 0–5)
EPITHELIAL CELLS, UA: ABNORMAL /HPF
GFR AFRICAN AMERICAN: 8
GFR NON-AFRICAN AMERICAN: 6
GLUCOSE BLD-MCNC: 181 MG/DL (ref 74–109)
GLUCOSE URINE: 100 MG/DL
HCT VFR BLD CALC: 36.1 % (ref 37–47)
HEMOGLOBIN: 11.8 G/DL (ref 12–16)
IMMATURE GRANULOCYTES #: 0.8 K/UL
KETONES, URINE: ABNORMAL MG/DL
LEUKOCYTE ESTERASE, URINE: ABNORMAL
LIPASE: 31 U/L (ref 13–60)
LYMPHOCYTES ABSOLUTE: 1.6 K/UL (ref 1.1–4.5)
LYMPHOCYTES RELATIVE PERCENT: 8.1 % (ref 20–40)
MCH RBC QN AUTO: 35.8 PG (ref 27–31)
MCHC RBC AUTO-ENTMCNC: 32.7 G/DL (ref 33–37)
MCV RBC AUTO: 109.4 FL (ref 81–99)
MONOCYTES ABSOLUTE: 1 K/UL (ref 0–0.9)
MONOCYTES RELATIVE PERCENT: 4.9 % (ref 0–10)
NEUTROPHILS ABSOLUTE: 15.7 K/UL (ref 1.5–7.5)
NEUTROPHILS RELATIVE PERCENT: 81.7 % (ref 50–65)
NITRITE, URINE: NEGATIVE
PDW BLD-RTO: 15.8 % (ref 11.5–14.5)
PH UA: 5 (ref 5–8)
PLATELET # BLD: 292 K/UL (ref 130–400)
PMV BLD AUTO: 9.6 FL (ref 9.4–12.3)
POTASSIUM SERPL-SCNC: 3.9 MMOL/L (ref 3.5–5)
PROTEIN UA: 300 MG/DL
RBC # BLD: 3.3 M/UL (ref 4.2–5.4)
RBC UA: ABNORMAL /HPF (ref 0–2)
SODIUM BLD-SCNC: 133 MMOL/L (ref 136–145)
SPECIFIC GRAVITY UA: 1.03 (ref 1–1.03)
TOTAL PROTEIN: 8.6 G/DL (ref 6.6–8.7)
UROBILINOGEN, URINE: 1 E.U./DL
WBC # BLD: 19.3 K/UL (ref 4.8–10.8)
WBC UA: ABNORMAL /HPF (ref 0–5)

## 2022-09-11 PROCEDURE — 6370000000 HC RX 637 (ALT 250 FOR IP): Performed by: EMERGENCY MEDICINE

## 2022-09-11 PROCEDURE — 2500000003 HC RX 250 WO HCPCS: Performed by: EMERGENCY MEDICINE

## 2022-09-11 PROCEDURE — 85025 COMPLETE CBC W/AUTO DIFF WBC: CPT

## 2022-09-11 PROCEDURE — 2580000003 HC RX 258: Performed by: EMERGENCY MEDICINE

## 2022-09-11 PROCEDURE — 80053 COMPREHEN METABOLIC PANEL: CPT

## 2022-09-11 PROCEDURE — 87040 BLOOD CULTURE FOR BACTERIA: CPT

## 2022-09-11 PROCEDURE — 99284 EMERGENCY DEPT VISIT MOD MDM: CPT

## 2022-09-11 PROCEDURE — 74176 CT ABD & PELVIS W/O CONTRAST: CPT

## 2022-09-11 PROCEDURE — 36415 COLL VENOUS BLD VENIPUNCTURE: CPT

## 2022-09-11 PROCEDURE — 96375 TX/PRO/DX INJ NEW DRUG ADDON: CPT

## 2022-09-11 PROCEDURE — 6360000002 HC RX W HCPCS: Performed by: EMERGENCY MEDICINE

## 2022-09-11 PROCEDURE — 96374 THER/PROPH/DIAG INJ IV PUSH: CPT

## 2022-09-11 PROCEDURE — 83690 ASSAY OF LIPASE: CPT

## 2022-09-11 PROCEDURE — 87086 URINE CULTURE/COLONY COUNT: CPT

## 2022-09-11 PROCEDURE — 81001 URINALYSIS AUTO W/SCOPE: CPT

## 2022-09-11 PROCEDURE — 74176 CT ABD & PELVIS W/O CONTRAST: CPT | Performed by: RADIOLOGY

## 2022-09-11 RX ORDER — ONDANSETRON 2 MG/ML
4 INJECTION INTRAMUSCULAR; INTRAVENOUS ONCE
Status: COMPLETED | OUTPATIENT
Start: 2022-09-11 | End: 2022-09-11

## 2022-09-11 RX ORDER — SODIUM CHLORIDE, SODIUM LACTATE, POTASSIUM CHLORIDE, AND CALCIUM CHLORIDE .6; .31; .03; .02 G/100ML; G/100ML; G/100ML; G/100ML
1000 INJECTION, SOLUTION INTRAVENOUS ONCE
Status: DISCONTINUED | OUTPATIENT
Start: 2022-09-11 | End: 2022-09-11

## 2022-09-11 RX ORDER — ONDANSETRON 4 MG/1
4 TABLET, ORALLY DISINTEGRATING ORAL ONCE
Status: COMPLETED | OUTPATIENT
Start: 2022-09-11 | End: 2022-09-11

## 2022-09-11 RX ORDER — ONDANSETRON 4 MG/1
4 TABLET, ORALLY DISINTEGRATING ORAL EVERY 8 HOURS PRN
Qty: 12 TABLET | Refills: 0 | Status: SHIPPED | OUTPATIENT
Start: 2022-09-11

## 2022-09-11 RX ORDER — 0.9 % SODIUM CHLORIDE 0.9 %
500 INTRAVENOUS SOLUTION INTRAVENOUS ONCE
Status: COMPLETED | OUTPATIENT
Start: 2022-09-11 | End: 2022-09-11

## 2022-09-11 RX ORDER — CEFDINIR 300 MG/1
300 CAPSULE ORAL 2 TIMES DAILY
Qty: 14 CAPSULE | Refills: 0 | Status: SHIPPED | OUTPATIENT
Start: 2022-09-11 | End: 2022-09-18

## 2022-09-11 RX ADMIN — ONDANSETRON 4 MG: 2 INJECTION INTRAMUSCULAR; INTRAVENOUS at 13:38

## 2022-09-11 RX ADMIN — CEFTRIAXONE 1000 MG: 1 INJECTION, POWDER, FOR SOLUTION INTRAMUSCULAR; INTRAVENOUS at 18:05

## 2022-09-11 RX ADMIN — ONDANSETRON 4 MG: 4 TABLET, ORALLY DISINTEGRATING ORAL at 16:44

## 2022-09-11 RX ADMIN — SODIUM CHLORIDE 500 ML: 9 INJECTION, SOLUTION INTRAVENOUS at 13:38

## 2022-09-11 RX ADMIN — SODIUM CHLORIDE, PRESERVATIVE FREE 20 MG: 5 INJECTION INTRAVENOUS at 13:43

## 2022-09-11 ASSESSMENT — ENCOUNTER SYMPTOMS
SORE THROAT: 0
EYE DISCHARGE: 0
CHOKING: 0
NAUSEA: 1
CONSTIPATION: 0
SINUS PRESSURE: 0
BLOOD IN STOOL: 0
APNEA: 0
FACIAL SWELLING: 0
SHORTNESS OF BREATH: 0
DIARRHEA: 0
VOICE CHANGE: 0
ABDOMINAL PAIN: 0

## 2022-09-11 ASSESSMENT — PAIN - FUNCTIONAL ASSESSMENT: PAIN_FUNCTIONAL_ASSESSMENT: NONE - DENIES PAIN

## 2022-09-11 NOTE — ED NOTES
Charge nurse Jolynn Rivera to obtain ultrasound IV     [facility]  09/11/22 9701

## 2022-09-11 NOTE — ED NOTES
Phlebotomy to collect blood cultures      [facility], ISAAC  09/11/22 2071

## 2022-09-11 NOTE — ED PROVIDER NOTES
[facility] [place] [facility] EMERGENCY DEPT  eMERGENCY dEPARTMENT eNCOUnter      Pt Name: Agnieszka Rizvi  MRN: [id number]  [place] 1960  Date of evaluation: 9/11/2022  Provider: Maria Del Carmen Jha MD    [address] [place] [address]       Chief Complaint   Patient presents with    Nausea    Dizziness         HISTORY OF PRESENT ILLNESS   (Location/Symptom, Timing/Onset,Context/Setting, Quality, Duration, Modifying Factors, Severity)  Note limiting factors. Agnieszka Rizvi is a 58 y.o. female who presents to the emergency department nausea and dizziness. 58year-old hemodialysis patient presents with complaints of nausea vomiting and dizziness. She does home dialysis 5 days a week. Today is her day off. She is not sure if that had anything to do with her symptoms. No one else is sick at home. No documented fever. She does make some urine if she consumes enough liquids. She is not complaining of headache or neurologic deficits. No chest pain. The history is provided by the patient. NursingNotes were reviewed. REVIEW OF SYSTEMS    (2-9 systems for level 4, 10 or more for level 5)     Review of Systems   Constitutional:  Negative for chills and fever. HENT:  Negative for congestion, drooling, facial swelling, nosebleeds, sinus pressure, sore throat and voice change. Eyes:  Negative for discharge. Respiratory:  Negative for apnea, choking and shortness of breath. Cardiovascular:  Negative for chest pain and leg swelling. Gastrointestinal:  Positive for nausea. Negative for abdominal pain, blood in stool, constipation and diarrhea. Genitourinary:  Negative for dysuria, enuresis and flank pain. Musculoskeletal:  Negative for joint swelling. Skin:  Negative for rash and wound. Neurological:  Positive for dizziness. Negative for seizures and syncope. Psychiatric/Behavioral:  Negative for behavioral problems, hallucinations and suicidal ideas. All other systems reviewed and are negative.     A complete review of systems was performed and is negative except as noted above in the HPI. PAST MEDICAL HISTORY     Past Medical History:   Diagnosis Date    Anxiety     Arthritis     CKD (chronic kidney disease)     stage 4    Colon polyp     Depression     Embolism - blood clot 2004    Hemodialysis patient ([address]     dialysis mon - friday x 2 weeks.     Hx of blood clots     Hyperlipidemia     Hypertension     Obesity     Sleep apnea     Thyroid disease     Type II or unspecified type diabetes mellitus without mention of complication, not stated as uncontrolled          SURGICAL HISTORY       Past Surgical History:   Procedure Laterality Date    CHOLECYSTECTOMY      DIALYSIS CATHETER INSERTION N/A 8/11/2021    INSERTION CATHETER DIALYSIS performed by Karron Goodell, MD at [address] Left 06/18/2021    LEFT BRACHIAL-CEPHALIC AV FISTULA CREATION performed by Karron Goodell, MD at [address]  Left 7/27/2021    LEFT UPPER EXTREMITY CEPHALIC VEIN SUPERFICIALIZATION performed by Karron Goodell, MD at [address] [place] [address] (CERVIX STATUS UNKNOWN)  10/17/2018    POLYPECTOMY      RECTAL SURGERY      fistula repair    TONSILLECTOMY AND ADENOIDECTOMY      VASCULAR SURGERY  07/12/2021    SJS- Ultrasound-guided cannulation left distal upper arm cephalic vein with 4 [place]uran glide sheath, Left upper extremity fistulogram's including venography the superior vena cava    VASCULAR SURGERY  07/27/2021    SJS- Superficialization of the left upper arm cephalic vein arteriovenous fistula         CURRENT MEDICATIONS       Previous Medications    ALLOPURINOL (ZYLOPRIM) 100 MG TABLET    Take 100 mg by mouth 2 times daily    ASPIRIN 81 MG TABLET    Take 81 mg by mouth daily With Dinner    B COMPLEX-C-FOLIC ACID (NEPHROCAPS) 1 MG CAPSULE    Take 1 capsule by mouth daily    BUSPIRONE (BUSPAR) 10 MG TABLET    Take 20 mg by mouth 2 times daily    CALCITRIOL (ROCALTROL) 0.5 MCG CAPSULE    Take 0.5 mcg by mouth daily    CARVEDILOL (COREG) 6.25 MG TABLET    Take 6.25 mg by mouth nightly as needed    CETIRIZINE (ZYRTEC) 10 MG TABLET    Take 10 mg by mouth as needed for Allergies     CITALOPRAM (CELEXA) 40 MG TABLET    Take 40 mg by mouth daily    DIAZEPAM (VALIUM) 2 MG TABLET    Take 2 mg by mouth as needed for Anxiety.      DOCUSATE SODIUM (COLACE) 100 MG CAPSULE    Take 200 mg by mouth in the morning, at noon, and at bedtime    EPOETIN WEI (EPOGEN;PROCRIT) 89719 UNIT/ML INJECTION    Inject 10,000 Units into the skin every 30 days     EVOLOCUMAB (REPATHA SC)    Inject into the skin    FERRIC CITRATE (AURYXIA) 1  MG(FE) TABS    Take 2 tablets by mouth 3 times daily (with meals)     FLUTICASONE (FLONASE) 50 MCG/ACT NASAL SPRAY    2 sprays by Nasal route daily as needed for Rhinitis     FREESTYLE LANCETS MISC    Use as instructed 4 x daily    INSULIN PEN NEEDLE (B-D ULTRAFINE III SHORT PEN) 31G X 8 MM MISC    USE AS DIRECTED TO INJECT FOUR TIMES DAILY    INSULIN REGULAR HUMAN (HUMULIN R U-500 KWIKPEN SC)    Inject 160 Units into the skin 160 qam  120qpm    INSULIN SYRINGE-NEEDLE U-100 (BD INSULIN SYRINGE U/F) 31G X 5/16\" 1 ML MISC    AS DIRECTED FOUR TIMES A DAY    LAMOTRIGINE (LAMICTAL) 100 MG TABLET    Take 100 mg by mouth nightly Indications: 1/2 tablet with dinner     LEVOTHYROXINE (SYNTHROID) 88 MCG TABLET    Take 88 mcg by mouth Daily     POTASSIUM CHLORIDE (KLOR-CON) 20 MEQ PACKET    Take 20 mEq by mouth daily    ROSUVASTATIN (CRESTOR) 20 MG TABLET    Take 20 mg by mouth daily     SODIUM BICARBONATE 650 MG TABLET    Take 1 tablet by mouth 3 times daily    TRULICITY 3 QB/9.7PK SOPN    3 mg once a week     VITAMIN C (ASCORBIC ACID) 500 MG TABLET    Take 500 mg by mouth daily       ALLERGIES     Codeine    FAMILY HISTORY       Family History   Problem Relation Age of Onset    Lung Cancer Mother     Brain Cancer Mother     Heart Attack Father     Prostate Cancer Father     Heart Disease Father     Stroke Father     Uterine Cancer Maternal Grandmother     Cervical Cancer Maternal Grandmother     Diabetes Maternal Grandfather     Other Maternal Grandfather         histoplasmosis    Diabetes Paternal Grandfather           SOCIAL HISTORY       Social History     Socioeconomic History    Marital status:      Spouse name: None    Number of children: None    Years of education: None    Highest education level: None   Tobacco Use    Smoking status: Never    Smokeless tobacco: Never   Vaping Use    Vaping Use: Never used   Substance and Sexual Activity    Alcohol use: No    Drug use: No    Sexual activity: Never       SCREENINGS    [place] Coma Scale  Eye Opening: Spontaneous  Best Verbal Response: Oriented  Best Motor Response: Obeys commands  Sherrie Coma Scale Score: 15        PHYSICAL EXAM    (up to 7 for level 4, 8 or more for level 5)     ED Triage Vitals [09/11/22 1106]   BP Temp Temp Source Heart Rate Resp SpO2 Height Weight   121/81 98 °F (36.7 °C) Oral 87 18 100 % 5' 3\" (1.6 m) (!) 324 lb (147 kg)       Physical Exam  Vitals and nursing note reviewed. Constitutional:       General: She is not in acute distress. Appearance: She is well-developed. HENT:      Head: Normocephalic and atraumatic. Right Ear: Tympanic membrane, ear canal and external ear normal.      Left Ear: Tympanic membrane, ear canal and external ear normal.      Nose: Nose normal.      Mouth/Throat:      Mouth: Mucous membranes are moist.      Pharynx: Oropharynx is clear. Eyes:      General: No scleral icterus. Conjunctiva/sclera: Conjunctivae normal.      Pupils: Pupils are equal, round, and reactive to light. Cardiovascular:      Rate and Rhythm: Normal rate and regular rhythm. Pulses: Normal pulses. Heart sounds: Normal heart sounds. No murmur heard. Pulmonary:      Effort: Pulmonary effort is normal. No respiratory distress. Breath sounds: Normal breath sounds.  No wheezing or rales.   Abdominal:      General: Bowel sounds are normal. There is no distension. Palpations: Abdomen is soft. Tenderness: There is abdominal tenderness (Patient denies pain but she was jumpy on examination of her lower abdomen. ). There is no guarding or rebound. Musculoskeletal:         General: Normal range of motion. Cervical back: Normal range of motion and neck supple. Skin:     General: Skin is warm and dry. Coloration: Skin is not jaundiced or pale. Neurological:      General: No focal deficit present. Mental Status: She is alert and oriented to person, place, and time. Psychiatric:         Mood and Affect: Mood normal.         Behavior: Behavior normal.       DIAGNOSTIC RESULTS     EKG: All EKG's are interpreted by the Emergency Department Physician who either signs or Co-signs this chart in the absence of a cardiologist.        RADIOLOGY:   Non-plain film images such as CT, Ultrasound and MRI are read by the radiologist. Plainradiographic images are visualized and preliminarily interpreted by the emergency physician with the below findings:    I have reviewed the results. Interpretation per the Radiologist below, if available at the time of this note:    CT ABDOMEN PELVIS WO CONTRAST Additional Contrast? None   Final Result   1. No acute abdominal or pelvic visceral pathology. Appendix not well visualized. 2.  Trace left lower lobe atelectasis. 3.  Other nonacute findings above. Recommendation: Follow up as clinically indicated. All CT scans at this facility utilize dose modulation, iterative reconstruction, and/or weight based dosing when appropriate to reduce radiation dose to as low as reasonably achievable.             Electronically Signed by Jennifer Parisi MD at 11-Sep-2022 04:33:48 PM                     ED BEDSIDE ULTRASOUND:   Performed by ED Physician - none    LABS:  Labs Reviewed   CBC WITH AUTO DIFFERENTIAL - Abnormal; Notable for the following components:       Result Value    WBC 19.3 (*)     RBC 3.30 (*)     Hemoglobin 11.8 (*)     Hematocrit 36.1 (*)     .4 (*)     MCH 35.8 (*)     MCHC 32.7 (*)     RDW 15.8 (*)     Neutrophils % 81.7 (*)     Lymphocytes % 8.1 (*)     Neutrophils Absolute 15.7 (*)     Monocytes Absolute 1.00 (*)     All other components within normal limits   COMPREHENSIVE METABOLIC PANEL - Abnormal; Notable for the following components:    Sodium 133 (*)     Chloride 87 (*)     Glucose 181 (*)     BUN 63 (*)     Creatinine 6.5 (*)     GFR Non- 6 (*)     GFR  8 (*)     Calcium 10.6 (*)     All other components within normal limits   URINALYSIS WITH REFLEX TO CULTURE - Abnormal; Notable for the following components:    Color, UA DARK YELLOW (*)     Clarity, UA TURBID (*)     Glucose, Ur 100 (*)     Bilirubin Urine MODERATE (*)     Ketones, Urine TRACE (*)     Blood, Urine LARGE (*)     Leukocyte Esterase, Urine MODERATE (*)     All other components within normal limits   MICROSCOPIC URINALYSIS - Abnormal; Notable for the following components:    WBC, UA TNTC (*)     RBC, UA TNTC (*)     Bacteria, UA 1+ (*)     All other components within normal limits   CULTURE, URINE   CULTURE, BLOOD 1   CULTURE, BLOOD 2   LIPASE       All other labs were within normal range or not returned as of this dictation. EMERGENCY DEPARTMENT COURSE and DIFFERENTIALDIAGNOSIS/MDM:   Vitals:    Vitals:    09/11/22 1106 09/11/22 1300 09/11/22 1500 09/11/22 1537   BP: 121/81 (!) 142/70 135/61    Pulse: 87      Resp: 18      Temp: 98 °F (36.7 °C)      TempSrc: Oral      SpO2: 100% 96% 94% 94%   Weight: (!) 324 lb (147 kg)      Height: 5' 3\" (1.6 m)          MDM  Number of Diagnoses or Management Options  Dizziness  End stage renal disease on dialysis (HCC)  Nausea  Urinary tract infection with hematuria, site unspecified  Diagnosis management comments: Your urine looks infected. I spoke with nephrology.   I Carolin discharge

## 2022-09-11 NOTE — ED NOTES
Pt able to ambulate to restroom with assistance at this time - pt tolerated well - will continue to monitor - MD aware of orthostatic vitals      Judd Dietz RN  09/11/22 6538

## 2022-09-12 ENCOUNTER — TELEPHONE (OUTPATIENT)
Dept: VASCULAR SURGERY | Age: 62
End: 2022-09-12

## 2022-09-12 DIAGNOSIS — F41.9 ANXIETY: ICD-10-CM

## 2022-09-12 DIAGNOSIS — E78.1 HYPERTRIGLYCERIDEMIA: ICD-10-CM

## 2022-09-12 DIAGNOSIS — F41.8 MIXED ANXIETY AND DEPRESSIVE DISORDER: ICD-10-CM

## 2022-09-12 NOTE — TELEPHONE ENCOUNTER
Caller: Maria C Shrestha    Relationship: Self    Best call back number: [phone]    Requested Prescriptions:   Requested Prescriptions     Pending Prescriptions Disp Refills   • busPIRone (BUSPAR) 10 MG tablet 180 tablet 0     Sig: Take 2 tablets by mouth 2 (Two) Times a Day.   • buPROPion SR (Wellbutrin SR) 100 MG 12 hr tablet 60 tablet 1     Sig: Take 1 tablet by mouth 2 (Two) Times a Day.   • rosuvastatin (CRESTOR) 20 MG tablet 90 tablet 1     Sig: Take 1 tablet by mouth Every Night.        Pharmacy where request should be sent: [facility] - [address] - [phone] [facility] [phone] FX     Additional details provided by patient:     Does the patient have less than a 3 day supply:  [] Yes  [] No    Osvaldo KNUTSON   09/12/22 13:31 CDT

## 2022-09-13 ENCOUNTER — OFFICE VISIT (OUTPATIENT)
Dept: VASCULAR SURGERY | Age: 62
End: 2022-09-13

## 2022-09-13 VITALS
OXYGEN SATURATION: 98 % | HEIGHT: 63 IN | TEMPERATURE: 97.1 F | SYSTOLIC BLOOD PRESSURE: 134 MMHG | BODY MASS INDEX: 57.39 KG/M2 | HEART RATE: 87 BPM | DIASTOLIC BLOOD PRESSURE: 66 MMHG

## 2022-09-13 DIAGNOSIS — N18.6 ESRD (END STAGE RENAL DISEASE) (HCC): Primary | ICD-10-CM

## 2022-09-13 LAB — URINE CULTURE, ROUTINE: NORMAL

## 2022-09-13 PROCEDURE — 99024 POSTOP FOLLOW-UP VISIT: CPT | Performed by: PHYSICIAN ASSISTANT

## 2022-09-13 RX ORDER — BUSPIRONE HYDROCHLORIDE 10 MG/1
20 TABLET ORAL 2 TIMES DAILY
Qty: 180 TABLET | Refills: 0 | Status: SHIPPED | OUTPATIENT
Start: 2022-09-13 | End: 2022-09-21 | Stop reason: SDUPTHER

## 2022-09-13 RX ORDER — ROSUVASTATIN CALCIUM 20 MG/1
20 TABLET, COATED ORAL NIGHTLY
Qty: 90 TABLET | Refills: 1 | Status: SHIPPED | OUTPATIENT
Start: 2022-09-13

## 2022-09-13 RX ORDER — BUPROPION HYDROCHLORIDE 100 MG/1
100 TABLET, EXTENDED RELEASE ORAL 2 TIMES DAILY
Qty: 60 TABLET | Refills: 1 | Status: SHIPPED | OUTPATIENT
Start: 2022-09-13 | End: 2022-09-21 | Stop reason: SDUPTHER

## 2022-09-13 NOTE — PROGRESS NOTES
Patient Care Team:  Nick Pal MD as PCP - General (Pediatrics)      History and Physical    She has a history of chronic kidney disease for a duration of {MISC; LENGTH OF LXZDV:48905}. This is believed to be caused by {Diagnoses; HFTR:07520}. She has experienced {Renal failure ros:97480}. She is now stage V and is in need of hemodiaylsis access. She is {RIGHT/LEFT:16::\"right\"} handed. She has had previous permacath. *** is  here to discuss more permanent access. *** dialyze at ***. Cynthia Johnson is a 58 y.o. female with the following history reviewed and recorded in [employer]:  Patient Active Problem List    Diagnosis Date Noted    Pyelonephritis 06/25/2022     Priority: Medium    Hypoxia     [facility] 08/10/2021    Acute hypoxemic respiratory failure ([facility] 08/10/2021    Diabetes ([facility] 08/10/2021    Hypertension 08/10/2021    Allergic rhinitis with postnasal drip 04/06/2021    Deep vein thrombosis of lower extremity ([facility] 10/09/2019    Disease of liver 10/09/2019    ESRD on dialysis ([facility] 07/24/2018    Thyroid disease 02/26/2017    Sleep apnea 02/26/2017     Last Assessment & Plan:   Patient states that she currently uses a CPAP at night for obstructive sleep apnea. She states that she has noticed that she has had more drainage and sinus pressure causing throat and uvula swelling. She states that she feels like she is gagging or choking at times while she is trying to sleep. She is concerned and wishes to be evaluated for this. I will refer to ENT at this time. I will also treat for a possible sinus infection, unable to do steroids due to her being an insulin-dependent diabetic.       Mood disorder ([facility] 02/26/2017    Type 2 DM with CKD stage 3 and hypertension ([facility] 02/26/2017    Hypertriglyceridemia 02/26/2017    Anxiety 02/26/2017    Vitamin D deficiency 02/26/2017    Anemia 02/26/2017    Hypocalcemia 02/26/2017    Anemia of chronic renal failure 12/12/2016    Hypertension associated with diabetes ([facility] 10/17/2016    B12 deficiency 10/17/2016    Mixed diabetic hyperlipidemia associated with type 2 diabetes mellitus ([facility] 10/17/2016    Morbid obesity ([place] [address] 09/15/2011    Embolism ([place] [address] 01/01/2004     Updating deleted diagnoses       Current Outpatient Medications   Medication Sig Dispense Refill    cefdinir (OMNICEF) 300 MG capsule Take 1 capsule by mouth 2 times daily for 7 days 14 capsule 0    ondansetron (ZOFRAN ODT) 4 MG disintegrating tablet Take 1 tablet by mouth every 8 hours as needed for Nausea or Vomiting 12 tablet 0    sodium bicarbonate 650 MG tablet Take 1 tablet by mouth 3 times daily 90 tablet 3    Insulin Regular Human (HUMULIN R U-500 KWIKPEN SC) Inject 160 Units into the skin 160 qam  120qpm      potassium chloride (KLOR-CON) 20 MEQ packet Take 20 mEq by mouth daily      vitamin C (ASCORBIC ACID) 500 MG tablet Take 500 mg by mouth daily      carvedilol (COREG) 6.25 MG tablet Take 6.25 mg by mouth nightly as needed      docusate sodium (COLACE) 100 MG capsule Take 200 mg by mouth in the morning, at noon, and at bedtime      calcitRIOL (ROCALTROL) 0.5 MCG capsule Take 0.5 mcg by mouth daily      Evolocumab (REPATHA SC) Inject into the skin      Ferric Citrate (AURYXIA) 1  MG(Fe) TABS Take 2 tablets by mouth 3 times daily (with meals)       b complex-C-folic acid (NEPHROCAPS) 1 MG capsule Take 1 capsule by mouth daily 30 capsule 0    TRULICITY 3 EK/3.5ZF SOPN 3 mg once a week       levothyroxine (SYNTHROID) 88 MCG tablet Take 88 mcg by mouth Daily       rosuvastatin (CRESTOR) 20 MG tablet Take 20 mg by mouth daily       cetirizine (ZYRTEC) 10 MG tablet Take 10 mg by mouth as needed for Allergies       diazePAM (VALIUM) 2 MG tablet Take 2 mg by mouth as needed for Anxiety.        fluticasone (FLONASE) 50 MCG/ACT nasal spray 2 sprays by Nasal route daily as needed for Rhinitis       Insulin Pen Needle (B-D ULTRAFINE III SHORT PEN) 31G X 8 MM MISC USE AS DIRECTED TO INJECT FOUR TIMES DAILY      Insulin Syringe-Needle U-100 31G X 5/16\" 1 ML MISC AS DIRECTED FOUR TIMES A DAY      FreeStyle Lancets MISC Use as instructed 4 x daily      epoetin jose ramon (EPOGEN;PROCRIT) 42451 UNIT/ML injection Inject 10,000 Units into the skin every 30 days       lamoTRIgine (LAMICTAL) 100 MG tablet Take 100 mg by mouth nightly Indications: 1/2 tablet with dinner       allopurinol (ZYLOPRIM) 100 MG tablet Take 100 mg by mouth 2 times daily      citalopram (CELEXA) 40 MG tablet Take 40 mg by mouth daily      busPIRone (BUSPAR) 10 MG tablet Take 20 mg by mouth 2 times daily      aspirin 81 MG tablet Take 81 mg by mouth daily With Dinner       No current facility-administered medications for this visit. Allergies: Codeine  Past Medical History:   Diagnosis Date    Anxiety     Arthritis     CKD (chronic kidney disease)     stage 4    Colon polyp     Depression     Embolism - blood clot 2004    Hemodialysis patient ([facility]     dialysis mon - friday x 2 weeks.     Hx of blood clots     Hyperlipidemia     Hypertension     Obesity     Sleep apnea     Thyroid disease     Type II or unspecified type diabetes mellitus without mention of complication, not stated as uncontrolled      Past Surgical History:   Procedure Laterality Date    CHOLECYSTECTOMY      DIALYSIS CATHETER INSERTION N/A 8/11/2021    INSERTION CATHETER DIALYSIS performed by Brad Delgado MD at [place] [address] Left 06/18/2021    LEFT BRACHIAL-CEPHALIC AV FISTULA CREATION performed by Brad Delgado MD at [place] [address] Left 7/27/2021    LEFT UPPER EXTREMITY CEPHALIC VEIN SUPERFICIALIZATION performed by Brad Delgado MD at [address] [place] [address] (CERVIX STATUS UNKNOWN)  10/17/2018    POLYPECTOMY      RECTAL SURGERY      fistula repair    TONSILLECTOMY AND ADENOIDECTOMY      VASCULAR SURGERY  07/12/2021    SJS- Ultrasound-guided cannulation left distal upper arm cephalic vein with 4 [place]i glide sheath, Left upper extremity fistulogram's including venography the superior vena cava    VASCULAR SURGERY  07/27/2021    SJS- Superficialization of the left upper arm cephalic vein arteriovenous fistula     Family History   Problem Relation Age of Onset    Lung Cancer Mother     Brain Cancer Mother     Heart Attack Father     Prostate Cancer Father     Heart Disease Father     Stroke Father     Uterine Cancer Maternal Grandmother     Cervical Cancer Maternal Grandmother     Diabetes Maternal Grandfather     Other Maternal Grandfather         histoplasmosis    Diabetes Paternal Grandfather      Social History     Tobacco Use    Smoking status: Never    Smokeless tobacco: Never   Substance Use Topics    Alcohol use: No       Old records have been obtained from the referring provider. These records have been reviewed and summarized. Review of Systems    Constitutional - no significant activity change, appetite change, or unexpected weight change. No fever or chills. No diaphoresis or significant fatigue. HENT - no significant rhinorrhea or epistaxis. No tinnitus or significant hearing loss. Eyes - no sudden vision change or amaurosis. Respiratory - no significant shortness of breath, wheezing, or stridor. No apnea, cough, or chest tightness associated with shortness of breath. Cardiovascular - no chest pain, syncope, or significant dizziness. No palpitations or significant leg swelling. No claudication. Gastrointestinal - no abdominal swelling or pain. No blood in stool. No severe constipation, diarrhea, nausea, or vomiting. Genitourinary - No dysuria, frequency, or urgency. No flank pain or hematuria. Musculoskeletal - no back pain, gait disturbance, or myalgia. Skin - no color change, rash, pallor, or new wound. Neurologic - no dizziness, facial asymmetry, or light headedness. No seizures. No speech difficulty or lateralizing weakness.   Hematologic - no easy bruising or excessive bleeding. Psychiatric - no severe anxiety or nervousness. No confusion. All other review of systems are negative. Physical Exam    /66 (Site: Right Lower Arm, Position: Sitting)   Pulse 87   Temp 97.1 °F (36.2 °C)   Ht 5' 3\" (1.6 m)   LMP 01/01/2000   SpO2 98%   BMI 57.39 kg/m²     Constitutional - well developed, well nourished. No diaphoresis or acute distress. HENT - head normocephalic. Right external ear canal appears normal.  Left external ear canal appears normal.  Septum appears midline. Eyes - conjunctiva normal.  EOMS normal.  No exudate. No icterus. Neck- ROM appears normal, no tracheal deviation. Cardiovascular - Regular rate and rhythm. Heart sounds are normal.  No murmur, rub, or gallop. Carotid pulses are 2+ to palpation bilaterally without bruit. Extremities - Radial and brachial pulses are 2+ to palpation bilaterally ***. No cyanosis, clubbing, or significant edema. No signs atheroembolic event. Palmar arch intact bilaterally ***. Pulmonary - effort appears normal.  No respiratory distress. Lungs - Breath sounds normal. No wheezes or rales. GI - Abdomen - soft, non tender, bowel sounds X 4 quadrants. No guarding or rebound tenderness. No distension or palpable mass. Genitourinary - deferred. Musculoskeletal - ROM appears normal.  No significant edema. Neurologic - alert and oriented X 3. Physiologic. Skin - warm, dry, and intact. No rash, erythema, or pallor. Psychiatric - mood, affect, and behavior appear normal.  Judgment and thought processes appear normal.    Options have been discussed with the patient including continued medical management vs. proceeding with ***. Patient has opted to proceed with ***. Risks have been discussed with the patient including but not limited to MI, death, CVA, bleed, nerve injury, steal, and infection. Assessment    No diagnosis found.       Plan    {PLAN; DIALYSIS YDFZEY:06252}

## 2022-09-16 ENCOUNTER — LAB (OUTPATIENT)
Dept: LAB | Facility: HOSPITAL | Age: 62
End: 2022-09-16

## 2022-09-16 DIAGNOSIS — E55.9 VITAMIN D DEFICIENCY: ICD-10-CM

## 2022-09-16 DIAGNOSIS — E11.69 MIXED DIABETIC HYPERLIPIDEMIA ASSOCIATED WITH TYPE 2 DIABETES MELLITUS: ICD-10-CM

## 2022-09-16 DIAGNOSIS — N18.6 TYPE 2 DIABETES MELLITUS WITH CHRONIC KIDNEY DISEASE ON CHRONIC DIALYSIS, WITH LONG-TERM CURRENT USE OF INSULIN: ICD-10-CM

## 2022-09-16 DIAGNOSIS — I15.2 HYPERTENSION ASSOCIATED WITH DIABETES: ICD-10-CM

## 2022-09-16 DIAGNOSIS — E53.8 B12 DEFICIENCY: ICD-10-CM

## 2022-09-16 DIAGNOSIS — Z99.2 TYPE 2 DIABETES MELLITUS WITH CHRONIC KIDNEY DISEASE ON CHRONIC DIALYSIS, WITH LONG-TERM CURRENT USE OF INSULIN: ICD-10-CM

## 2022-09-16 DIAGNOSIS — Z79.4 TYPE 2 DIABETES MELLITUS WITH CHRONIC KIDNEY DISEASE ON CHRONIC DIALYSIS, WITH LONG-TERM CURRENT USE OF INSULIN: ICD-10-CM

## 2022-09-16 DIAGNOSIS — E78.2 MIXED DIABETIC HYPERLIPIDEMIA ASSOCIATED WITH TYPE 2 DIABETES MELLITUS: ICD-10-CM

## 2022-09-16 DIAGNOSIS — E11.22 TYPE 2 DIABETES MELLITUS WITH CHRONIC KIDNEY DISEASE ON CHRONIC DIALYSIS, WITH LONG-TERM CURRENT USE OF INSULIN: ICD-10-CM

## 2022-09-16 DIAGNOSIS — E11.59 HYPERTENSION ASSOCIATED WITH DIABETES: ICD-10-CM

## 2022-09-16 DIAGNOSIS — E03.9 ACQUIRED HYPOTHYROIDISM: ICD-10-CM

## 2022-09-16 LAB
25(OH)D3 SERPL-MCNC: 64.4 NG/ML (ref 30–100)
ALBUMIN SERPL-MCNC: 4.2 G/DL (ref 3.5–5)
ALBUMIN/GLOB SERPL: 1.2 G/DL (ref 1.1–2.5)
ALP SERPL-CCNC: 132 U/L (ref 24–120)
ALT SERPL W P-5'-P-CCNC: 23 U/L (ref 0–35)
ANION GAP SERPL CALCULATED.3IONS-SCNC: 10 MMOL/L (ref 4–13)
AST SERPL-CCNC: 22 U/L (ref 7–45)
AUTO MIXED CELLS #: 0.8 10*3/MM3 (ref 0.1–2.6)
AUTO MIXED CELLS %: 7.2 % (ref 0.1–24)
BILIRUB SERPL-MCNC: 0.4 MG/DL (ref 0.1–1)
BLOOD CULTURE, ROUTINE: NORMAL
BUN SERPL-MCNC: 52 MG/DL (ref 5–21)
BUN/CREAT SERPL: 6.6
CALCIUM SPEC-SCNC: 9.4 MG/DL (ref 8.4–10.4)
CHLORIDE SERPL-SCNC: 87 MMOL/L (ref 98–110)
CHOLEST SERPL-MCNC: 143 MG/DL (ref 130–200)
CO2 SERPL-SCNC: 36 MMOL/L (ref 24–31)
CREAT SERPL-MCNC: 7.85 MG/DL (ref 0.5–1.4)
CULTURE, BLOOD 2: NORMAL
EGFRCR SERPLBLD CKD-EPI 2021: 5.4 ML/MIN/1.73
ERYTHROCYTE [DISTWIDTH] IN BLOOD BY AUTOMATED COUNT: 16 % (ref 12.3–15.4)
GLOBULIN UR ELPH-MCNC: 3.6 GM/DL
GLUCOSE SERPL-MCNC: 160 MG/DL (ref 70–100)
HBA1C MFR BLD: 6.2 % (ref 4.8–5.9)
HCT VFR BLD AUTO: 29.8 % (ref 34–46.6)
HDLC SERPL-MCNC: 33 MG/DL
HGB BLD-MCNC: 10.1 G/DL (ref 12–15.9)
LDLC SERPL CALC-MCNC: 62 MG/DL (ref 0–99)
LDLC/HDLC SERPL: 1.5 {RATIO}
LYMPHOCYTES # BLD AUTO: 1.6 10*3/MM3 (ref 0.7–3.1)
LYMPHOCYTES NFR BLD AUTO: 14.7 % (ref 19.6–45.3)
MCH RBC QN AUTO: 36.3 PG (ref 26.6–33)
MCHC RBC AUTO-ENTMCNC: 33.9 G/DL (ref 31.5–35.7)
MCV RBC AUTO: 107.2 FL (ref 79–97)
NEUTROPHILS NFR BLD AUTO: 78.1 % (ref 42.7–76)
NEUTROPHILS NFR BLD AUTO: 8.5 10*3/MM3 (ref 1.7–7)
PLATELET # BLD AUTO: 268 10*3/MM3 (ref 140–450)
PMV BLD AUTO: 8.4 FL (ref 6–12)
POTASSIUM SERPL-SCNC: 4.5 MMOL/L (ref 3.5–5.3)
PROT SERPL-MCNC: 7.8 G/DL (ref 6.3–8.7)
RBC # BLD AUTO: 2.78 10*6/MM3 (ref 3.77–5.28)
SODIUM SERPL-SCNC: 133 MMOL/L (ref 135–145)
TRIGL SERPL-MCNC: 302 MG/DL (ref 0–149)
TSH SERPL DL<=0.05 MIU/L-ACNC: 2.32 UIU/ML (ref 0.27–4.2)
VIT B12 BLD-MCNC: 656 PG/ML (ref 211–946)
VLDLC SERPL-MCNC: 48 MG/DL (ref 5–40)
WBC NRBC COR # BLD: 10.9 10*3/MM3 (ref 3.4–10.8)

## 2022-09-16 PROCEDURE — 82306 VITAMIN D 25 HYDROXY: CPT

## 2022-09-16 PROCEDURE — 82607 VITAMIN B-12: CPT

## 2022-09-16 PROCEDURE — 83036 HEMOGLOBIN GLYCOSYLATED A1C: CPT

## 2022-09-16 PROCEDURE — 80053 COMPREHEN METABOLIC PANEL: CPT

## 2022-09-16 PROCEDURE — 36415 COLL VENOUS BLD VENIPUNCTURE: CPT

## 2022-09-16 PROCEDURE — 84443 ASSAY THYROID STIM HORMONE: CPT

## 2022-09-16 PROCEDURE — 80061 LIPID PANEL: CPT

## 2022-09-16 PROCEDURE — 85025 COMPLETE CBC W/AUTO DIFF WBC: CPT

## 2022-09-21 DIAGNOSIS — F41.8 MIXED ANXIETY AND DEPRESSIVE DISORDER: ICD-10-CM

## 2022-09-21 DIAGNOSIS — F41.9 ANXIETY: ICD-10-CM

## 2022-09-21 RX ORDER — BUSPIRONE HYDROCHLORIDE 10 MG/1
20 TABLET ORAL 2 TIMES DAILY
Qty: 180 TABLET | Refills: 0 | Status: SHIPPED | OUTPATIENT
Start: 2022-09-21

## 2022-09-21 RX ORDER — BUPROPION HYDROCHLORIDE 100 MG/1
100 TABLET, EXTENDED RELEASE ORAL 2 TIMES DAILY
Qty: 60 TABLET | Refills: 1 | Status: SHIPPED | OUTPATIENT
Start: 2022-09-21

## 2022-09-21 RX ORDER — LAMOTRIGINE 100 MG/1
100 TABLET ORAL DAILY
Qty: 30 TABLET | Refills: 0 | Status: SHIPPED | OUTPATIENT
Start: 2022-09-21 | End: 2022-10-19

## 2022-09-21 NOTE — TELEPHONE ENCOUNTER
Caller: Maria C Shrestha    Relationship: Self    Best call back number: [phone]    Requested Prescriptions:   Requested Prescriptions     Pending Prescriptions Disp Refills   • buPROPion SR (Wellbutrin SR) 100 MG 12 hr tablet 60 tablet 1     Sig: Take 1 tablet by mouth 2 (Two) Times a Day.   • busPIRone (BUSPAR) 10 MG tablet 180 tablet 0     Sig: Take 2 tablets by mouth 2 (Two) Times a Day.   • lamoTRIgine (LaMICtal) 100 MG tablet 30 tablet 1     Sig: Take 1 tablet by mouth Daily.        Pharmacy where request should be sent: [address] - [phone]  - [phone] FX     Additional details provided by patient: TOTALLY OUT    Does the patient have less than a 3 day supply:  [x] Yes  [] No    Osvaldo Green Rep   09/21/22 11:42 CDT

## 2022-09-21 NOTE — TELEPHONE ENCOUNTER
Called pt, verified , pt requested refills on medications that she was completely out. Prescriptions verified and sent to pharmacy.

## 2022-09-23 ENCOUNTER — DOCUMENTATION (OUTPATIENT)
Dept: ENDOCRINOLOGY | Facility: CLINIC | Age: 62
End: 2022-09-23

## 2022-09-23 ENCOUNTER — TELEMEDICINE (OUTPATIENT)
Dept: ENDOCRINOLOGY | Facility: CLINIC | Age: 62
End: 2022-09-23

## 2022-09-23 ENCOUNTER — HOSPITAL ENCOUNTER (OUTPATIENT)
Dept: PREADMISSION TESTING | Age: 62
Discharge: HOME | End: 2022-09-27
Payer: MEDICARE

## 2022-09-23 ENCOUNTER — HOSPITAL ENCOUNTER (OUTPATIENT)
Dept: GENERAL RADIOLOGY | Age: 62
Discharge: HOME | End: 2022-09-23
Payer: MEDICARE

## 2022-09-23 VITALS — BODY MASS INDEX: 47.09 KG/M2 | HEIGHT: 66 IN | WEIGHT: 293 LBS

## 2022-09-23 DIAGNOSIS — I15.2 HYPERTENSION ASSOCIATED WITH DIABETES: ICD-10-CM

## 2022-09-23 DIAGNOSIS — E78.2 MIXED DIABETIC HYPERLIPIDEMIA ASSOCIATED WITH TYPE 2 DIABETES MELLITUS: ICD-10-CM

## 2022-09-23 DIAGNOSIS — E11.69 MIXED DIABETIC HYPERLIPIDEMIA ASSOCIATED WITH TYPE 2 DIABETES MELLITUS: ICD-10-CM

## 2022-09-23 DIAGNOSIS — E11.22 TYPE 2 DIABETES MELLITUS WITH ESRD (END-STAGE RENAL DISEASE): Primary | ICD-10-CM

## 2022-09-23 DIAGNOSIS — N18.6 TYPE 2 DIABETES MELLITUS WITH ESRD (END-STAGE RENAL DISEASE): Primary | ICD-10-CM

## 2022-09-23 DIAGNOSIS — E11.59 HYPERTENSION ASSOCIATED WITH DIABETES: ICD-10-CM

## 2022-09-23 DIAGNOSIS — E03.9 ACQUIRED HYPOTHYROIDISM: ICD-10-CM

## 2022-09-23 LAB
EKG P AXIS: -18 DEGREES
EKG P-R INTERVAL: 166 MS
EKG Q-T INTERVAL: 371 MS
EKG QRS DURATION: 97 MS
EKG QTC CALCULATION (BAZETT): 469 MS
EKG T AXIS: 46 DEGREES

## 2022-09-23 PROCEDURE — 71046 X-RAY EXAM CHEST 2 VIEWS: CPT

## 2022-09-23 PROCEDURE — 99214 OFFICE O/P EST MOD 30 MIN: CPT | Performed by: INTERNAL MEDICINE

## 2022-09-23 PROCEDURE — 93005 ELECTROCARDIOGRAM TRACING: CPT | Performed by: SURGERY

## 2022-09-23 RX ORDER — SUCROFERRIC OXYHYDROXIDE 500 MG/1
2 TABLET, CHEWABLE ORAL
COMMUNITY

## 2022-09-23 RX ORDER — BUPROPION HYDROCHLORIDE 150 MG/1
150 TABLET, EXTENDED RELEASE ORAL 2 TIMES DAILY
COMMUNITY

## 2022-09-23 RX ORDER — SEMAGLUTIDE 1.34 MG/ML
2 INJECTION, SOLUTION SUBCUTANEOUS WEEKLY
COMMUNITY

## 2022-09-23 NOTE — PROGRESS NOTES
Maria C Shrestha is a 62 y.o. female who presents for  evaluation of   Type 2 diabetes     Mode of Visit: Video  Location of patient: Home  You have chosen to receive care through a telehealth visit.  Does the patient consent to use a video/audio connection for your medical care today? Yes  The visit included audio and video interaction. No technical issues occurred during this visit             Primary Care / Referring Provider  Ole Soliman MD    History of Present Illness  Duration/Timing:  Diabetes mellitus type 2          Severity (Complications/Hospitalizations)  Secondary Microvascular Complications:  Diabetic Nephropathy-ESRD, No Diabetic Neuropathy, No retinopathy     Macrovascular  , Carotid Artery Disease      Context  Diabetes Regimen:  Insulin, Compliant with regimen  Blood Glucose Readings  Now at goal        Diet    counts carbs, eating only 45 g cho per meal     Exercise:  Does not exercise    Associated Signs/Symptoms  Hyperglycemic Symptoms:  No polyuria, No polydipsia, No polyphagia, Weight loss 70 lbs since HD   Hypoglycemic Episodes:  No documented hypoglycemia      Since last appt started HD     .  PE    AOx3  No visible goiter  Normal Respiratory Effort , Lung CTA  RRR  No Edema    Labs    Lab Results   Component Value Date    WBC 10.90 (H) 09/16/2022    HGB 10.1 (L) 09/16/2022    HCT 29.8 (L) 09/16/2022    .2 (H) 09/16/2022     09/16/2022     Lab Results   Component Value Date    GLUCOSE 160 (H) 09/16/2022    BUN 52 (H) 09/16/2022    CREATININE 7.85 (H) 09/16/2022    EGFRIFNONA 6 (A) 09/11/2022    EGFRIFAFRI 8 (L) 09/11/2022    BCR 6.6 (L) 09/16/2022     (L) 09/16/2022    K 4.5 09/16/2022    CO2 36.0 (H) 09/16/2022    CALCIUM 9.4 09/16/2022    ALBUMIN 4.20 09/16/2022    AST 22 09/16/2022    ALT 23 09/16/2022         Assessment & Plan       ICD-10-CM ICD-9-CM   1. Type 2 diabetes mellitus with ESRD (end-stage renal disease) (HCC)  E11.22 250.40    N18.6 585.6   2.  Hypertension associated with diabetes (HCC)  E11.59 250.80    I15.2 401.9   3. Mixed diabetic hyperlipidemia associated with type 2 diabetes mellitus (HCC)  E11.69 250.80    E78.2 272.2   4. Acquired hypothyroidism  E03.9 244.9       Glycemic Management:   Lab Results   Component Value Date    HGBA1C 6.2 (H) 09/16/2022    HGBA1C 9.4 (H) 06/25/2022    HGBA1C 9.0 (H) 06/02/2022     Lab Results   Component Value Date    GLUCOSE 160 (H) 09/16/2022    BUN 52 (H) 09/16/2022    CREATININE 7.85 (H) 09/16/2022    EGFRIFNONA 6 (A) 09/11/2022    EGFRIFAFRI 8 (L) 09/11/2022    BCR 6.6 (L) 09/16/2022    CO2 36.0 (H) 09/16/2022    CALCIUM 9.4 09/16/2022    ALBUMIN 4.20 09/16/2022    AST 22 09/16/2022    ALT 23 09/16/2022     Lab Results   Component Value Date    WBC 10.90 (H) 09/16/2022    HGB 10.1 (L) 09/16/2022    HCT 29.8 (L) 09/16/2022    .2 (H) 09/16/2022     09/16/2022     Lab Results   Component Value Date    CREATININE 7.85 (H) 09/16/2022    CREATININE 6.5 (H) 09/11/2022    CREATININE 8.5 (H) 06/25/2022    CREATININE 5.36 (H) 06/02/2022    CREATININE 3.69 (H) 02/23/2022        Using deidra 14 ( can't upgrade to type 2 )  Type 2 DM well controlled        Trulicity   3 mg weekly - change to ozempic 2 mg  Has constipation , better at managing it      Humulin U 500 through omnipod     Basal 1 unit per hour   Bolus 20 am and 10 pm     She has used insulin pump with tubing system in the past and has resulted in frequent accidental detachment.  She is responding well to insulin through pod system proven by [facility] decrease from 9.4 to 6.2    Baqsimi    Lipid Management    Lab Results   Component Value Date    CHOL 143 09/16/2022    CHLPL 172 07/14/2016    TRIG 302 (H) 09/16/2022    HDL 33 (L) 09/16/2022    LDL 62 09/16/2022         crestor and repatha, we will keep unless nephrology suggests otherwise     Blood Pressure Management:    There were no vitals filed for this visit.      Per nephrology -        Microvascular  Complication Monitoring:        ckd stage V    followed by nephrology      Eyes,  nl     No Neuropathy      Weight Related:   Wt Readings from Last 3 Encounters:   22 (!) 150 kg (331 lb)   22 (!) 161 kg (355 lb)   22 (!) 149 kg (327 lb 6.4 oz)     There is no height or weight on file to calculate BMI.      prefers no bariatric surgery    Now on cpap       Bone Health    Lab Results   Component Value Date    .3 (H) 2021    CALCIUM 9.4 2022       Lab Results   Component Value Date    QOQA84RG 64.4 2022    EYGN16IS 67.4 2022    OHJL28HS 73.8 2022         For JARETH       Per nephrology       Thyroid Health  Lab Results   Component Value Date    TSH 2.320 2022     88 mcgs daily of levothyroixne     Other Diabetes Related Aspects       Lab Results   Component Value Date    AHCQSZTB81 656 2022     Lab Results   Component Value Date    WBC 10.90 (H) 2022    HGB 10.1 (L) 2022    HCT 29.8 (L) 2022    .2 (H) 2022     2022     Lab Results   Component Value Date    IRON 171 (H) 2022    TIBC 285 (L) 2022    FERRITIN 1,696.00 (H) 2022       Anemia , managed by nephrology     On procrit    Had Hysterectomy     Systolic Murmur, AS? Echo   Could be due to anemia     Echo and carotid US  , normal echo and less than 50% blockage in carotids, repeat       ===========      Wilman Patel MD          This document has been electronically signed by Wilman Patel MD on 2022 17:03 CDT        Patient Demographics    Patient Name   Maria C Shrestha Legal Sex   Female    1960 [address]    Address   [address] [place] [address] [address]   [place] [id number] Phone   [phone] (Home)   [phone] (Mobile) *Preferred*       Patient Contacts    Name Relation Home Work Mobile   Rodri Shrestha Spouse [phone]         PCP and Center    Primary Care Provider   Ole Soliman,  "MD Phone   [phone] [facility] [place]       Patient Employment    Status   Disabled          Active Insurance as of 6/8/2022      MEDICARE - MEDICARE A & B    Payor Plan Insurance Group Employer/Plan Group   MEDICARE MEDICARE A & B     Payor Plan Address Payor Plan Phone Number Payor Plan Fax Number Effective Dates   [address] [phone]  11/1/2013 - None Entered   [facility] [id number]      Subscriber Name Subscriber Birth Date Member ID    ILEANA STEVEN M 1960 [id number]      [place]    Pay[place] Plan Insurance Group Employer/Plan Group   [place]     Payor Plan Address Payor Plan Phone Number Payor Plan Fax Number Effective Dates   [address] [phone]  4/10/2020 - None Entered   [place] [address]      Subscriber Name Subscriber Birth Date Member ID    ILEANA STEVEN 1960 [id number]        Current Medications    allopurinol (ZYLOPRIM) 100 MG tablet   aspirin 81 MG tablet   BD Insulin Syringe U/F 31G X 5/16\" 1 ML misc   busPIRone (BUSPAR) 10 MG tablet   calcitriol (ROCALTROL) 0.5 MCG capsule   carvedilol (COREG) 6.25 MG tablet   cetirizine (zyrTEC) 10 MG tablet   citalopram (CeleXA) 40 MG tablet   cloNIDine (CATAPRES) 0.1 MG tablet   diazePAM (VALIUM) 2 MG tablet   Docusate Calcium (STOOL SOFTENER PO)   epoetin elsa (EPOGEN,PROCRIT) 48777 UNIT/ML injection   Evolocumab with Infusor (REPATHA) solution cartridge   Ferric Citrate 1  MG(Fe) tablet   fluticasone (FLONASE) 50 MCG/ACT nasal spray   Glucose Blood (BLOOD GLUCOSE TEST) strip   Insulin Pen Needle (B-D ULTRAFINE III SHORT PEN) 31G X 8 MM misc   Insulin Regular Human, Conc, (HumuLIN R U-500 KwikPen) 500 UNIT/ML solution pen-injector CONCENTRATED injection   lamoTRIgine (LaMICtal) 100 MG tablet   Lancets (freestyle) lancets   levothyroxine (SYNTHROID, LEVOTHROID) 88 MCG tablet   Methoxy PEG-Epoetin Beta (MIRCERA IJ)   mupirocin (BACTROBAN) 2 % ointment   ONDANSETRON PO   PEG " 3350-KCl-NaBcb-NaCl-NaSulf (PEG-3350/Electrolytes) 236 g reconstituted solution   rosuvastatin (CRESTOR) 20 MG tablet   Dulaglutide (Trulicity) 3 MG/0.5ML solution pen-injector (Discontinued)   Semaglutide, 2 MG/DOSE, (Ozempic, 2 MG/DOSE,) 8 MG/3ML solution pen-injector   Dulaglutide (Trulicity) 4.5 MG/0.5ML solution pen-injector (Discontinued)   Evolocumab (Repatha) solution prefilled syringe injection (Discontinued)       Allergies    Codeine Nausea Only, Nausea And Vomiting   N/v/felt hot

## 2022-09-23 NOTE — DISCHARGE INSTRUCTIONS
Verified patient had preop instructions from [facility], [facility] office and reviewed where and when they arrive for surgery. Patient verbalized understanding. PREOPERATIVE GUIDELINES WHEN RECEIVING ANESTHESIA    Do not eat or drink anything after midnight, the night before your surgery. This is extremely important for your safety. Take a bath (or shower) the night before your surgery and you may brush your teeth the morning of your surgery. You will be scheduled to arrive at the hospital 2 hours before your surgery, or follow your surgeon's instructions. Dress comfortably. Wear loose clothing that will be easy to remove and comfortable for your trip home. You may wear eyeglasses or contacts but bring your cases with you as they must be remove before your surgery. Hearing aids and dentures will need to be removed before your surgery. Do not wear any jewelry, including body jewelry. All jewelry will need to be removed prior to your surgery. Do not wear fingernail polish or make-up. It is best not to bring any valuables with you. If you are to stay in the hospital overnight, bring your robe, slippers and personal toiletries that you may need. POSTOPERATIVE GUIDELINES AFTER RECEIVING ANESTHESIA    If you are to go home after your surgery, you will need a responsible adult to drive you home. You will not be able to take public transportation after your discharge from the Operative Care Unit unless you are accompanied by a        responsible adult. On returning home, be sure to follow your physician's orders regarding diet, activity and medications. Remember, surgery with general anesthesia or sedation may leave you sleepy, very tired and with a decreased appetite for 12 to 24 hours. If you develop any post-surgical complications or problems, call your surgeon or [facility] Emergency Department ([phone]).             [facility] Visitor Policy for Surgery Patients-Revised 6-    Visitors for surgery patients are essential for the patient's emotional well-being and care       post operatively. 2.   Visitor Expectations and Limitations        VISITORS MUST WEAR MASKS AT ALL TIMES. NO Cloth masks allowed. 3.  One visitor allowed with patients in the preop/postop rooms. 4.  A second visitor may sit in the waiting area. 5.  No children under 13 allowed in the pre-post op areas unless they are the patient. 6.  Two people may be with an underage surgical/procedural patient in preop/postop        room. 7.  If you are admitted to the hospital post operatively, there are NO RESTRICTIONS on       the floor at this time. 8.  If you are admitted to ICU postoperatively, you may have one visitor in the room from        7A-7P. A second visitor may sit in the ICU waiting room.   There can be no overnight

## 2022-09-26 ENCOUNTER — HOSPITAL ENCOUNTER (OUTPATIENT)
Dept: MAMMOGRAPHY | Facility: HOSPITAL | Age: 62
Discharge: HOME | End: 2022-09-26
Admitting: PEDIATRICS

## 2022-09-26 DIAGNOSIS — Z12.31 BREAST CANCER SCREENING BY MAMMOGRAM: ICD-10-CM

## 2022-09-26 PROCEDURE — 77067 SCR MAMMO BI INCL CAD: CPT

## 2022-09-26 PROCEDURE — 77063 BREAST TOMOSYNTHESIS BI: CPT

## 2022-09-27 ENCOUNTER — DOCUMENTATION (OUTPATIENT)
Dept: ENDOCRINOLOGY | Facility: CLINIC | Age: 62
End: 2022-09-27

## 2022-09-27 NOTE — PROGRESS NOTES
Called patients insurance for a PA on OmniPod Gen 5. Chart note is faxed to [employer] for PA. Fax #[phone]

## 2022-09-30 ENCOUNTER — ANESTHESIA EVENT (OUTPATIENT)
Dept: OPERATING ROOM | Age: 62
End: 2022-09-30
Payer: MEDICARE

## 2022-09-30 ENCOUNTER — APPOINTMENT (OUTPATIENT)
Dept: INTERVENTIONAL RADIOLOGY/VASCULAR | Age: 62
End: 2022-09-30
Attending: SURGERY
Payer: MEDICARE

## 2022-09-30 ENCOUNTER — HOSPITAL ENCOUNTER (OUTPATIENT)
Age: 62
Setting detail: OUTPATIENT SURGERY
Discharge: HOME | End: 2022-09-30
Attending: SURGERY | Admitting: SURGERY
Payer: MEDICARE

## 2022-09-30 ENCOUNTER — ANESTHESIA (OUTPATIENT)
Dept: OPERATING ROOM | Age: 62
End: 2022-09-30
Payer: MEDICARE

## 2022-09-30 VITALS
WEIGHT: 293 LBS | OXYGEN SATURATION: 100 % | RESPIRATION RATE: 14 BRPM | TEMPERATURE: 97.9 F | HEART RATE: 75 BPM | DIASTOLIC BLOOD PRESSURE: 52 MMHG | SYSTOLIC BLOOD PRESSURE: 121 MMHG | BODY MASS INDEX: 48.82 KG/M2 | HEIGHT: 65 IN

## 2022-09-30 LAB
ANION GAP SERPL CALCULATED.3IONS-SCNC: 17 MMOL/L (ref 7–19)
APTT: 26.5 SEC (ref 26–36.2)
BASOPHILS ABSOLUTE: 0.1 K/UL (ref 0–0.2)
BASOPHILS RELATIVE PERCENT: 0.6 % (ref 0–1)
BUN BLDV-MCNC: 33 MG/DL (ref 8–23)
CALCIUM SERPL-MCNC: 10.5 MG/DL (ref 8.8–10.2)
CHLORIDE BLD-SCNC: 93 MMOL/L (ref 98–111)
CO2: 28 MMOL/L (ref 22–29)
CREAT SERPL-MCNC: 5.1 MG/DL (ref 0.5–0.9)
EOSINOPHILS ABSOLUTE: 0.2 K/UL (ref 0–0.6)
EOSINOPHILS RELATIVE PERCENT: 2 % (ref 0–5)
GFR AFRICAN AMERICAN: 10
GFR NON-AFRICAN AMERICAN: 9
GLUCOSE BLD-MCNC: 231 MG/DL (ref 74–109)
HCT VFR BLD CALC: 29 % (ref 37–47)
HEMOGLOBIN: 9.3 G/DL (ref 12–16)
IMMATURE GRANULOCYTES #: 0.7 K/UL
INR BLD: 0.95 (ref 0.88–1.18)
LYMPHOCYTES ABSOLUTE: 1.6 K/UL (ref 1.1–4.5)
LYMPHOCYTES RELATIVE PERCENT: 12.8 % (ref 20–40)
MCH RBC QN AUTO: 35.2 PG (ref 27–31)
MCHC RBC AUTO-ENTMCNC: 32.1 G/DL (ref 33–37)
MCV RBC AUTO: 109.8 FL (ref 81–99)
MONOCYTES ABSOLUTE: 0.6 K/UL (ref 0–0.9)
MONOCYTES RELATIVE PERCENT: 5 % (ref 0–10)
NEUTROPHILS ABSOLUTE: 9 K/UL (ref 1.5–7.5)
NEUTROPHILS RELATIVE PERCENT: 73.7 % (ref 50–65)
PDW BLD-RTO: 15.6 % (ref 11.5–14.5)
PLATELET # BLD: 273 K/UL (ref 130–400)
PMV BLD AUTO: 9.1 FL (ref 9.4–12.3)
POTASSIUM SERPL-SCNC: 3.1 MMOL/L (ref 3.5–4.9)
PROTHROMBIN TIME: 12.6 SEC (ref 12–14.6)
RBC # BLD: 2.64 M/UL (ref 4.2–5.4)
SODIUM BLD-SCNC: 138 MMOL/L (ref 136–145)
WBC # BLD: 12.2 K/UL (ref 4.8–10.8)

## 2022-09-30 PROCEDURE — 85025 COMPLETE CBC W/AUTO DIFF WBC: CPT

## 2022-09-30 PROCEDURE — 36415 COLL VENOUS BLD VENIPUNCTURE: CPT

## 2022-09-30 PROCEDURE — 2709999900 HC NON-CHARGEABLE SUPPLY: Performed by: SURGERY

## 2022-09-30 PROCEDURE — C1894 INTRO/SHEATH, NON-LASER: HCPCS | Performed by: SURGERY

## 2022-09-30 PROCEDURE — 2500000003 HC RX 250 WO HCPCS: Performed by: NURSE ANESTHETIST, CERTIFIED REGISTERED

## 2022-09-30 PROCEDURE — 7100000011 HC PHASE II RECOVERY - ADDTL 15 MIN: Performed by: SURGERY

## 2022-09-30 PROCEDURE — C1725 CATH, TRANSLUMIN NON-LASER: HCPCS | Performed by: SURGERY

## 2022-09-30 PROCEDURE — 99153 MOD SED SAME PHYS/QHP EA: CPT | Performed by: SURGERY

## 2022-09-30 PROCEDURE — 80048 BASIC METABOLIC PNL TOTAL CA: CPT

## 2022-09-30 PROCEDURE — 85610 PROTHROMBIN TIME: CPT

## 2022-09-30 PROCEDURE — 85730 THROMBOPLASTIN TIME PARTIAL: CPT

## 2022-09-30 PROCEDURE — 7100000001 HC PACU RECOVERY - ADDTL 15 MIN: Performed by: SURGERY

## 2022-09-30 PROCEDURE — 6360000002 HC RX W HCPCS: Performed by: SURGERY

## 2022-09-30 PROCEDURE — 2580000003 HC RX 258: Performed by: ANESTHESIOLOGY

## 2022-09-30 PROCEDURE — 7100000000 HC PACU RECOVERY - FIRST 15 MIN: Performed by: SURGERY

## 2022-09-30 PROCEDURE — 6360000002 HC RX W HCPCS: Performed by: NURSE ANESTHETIST, CERTIFIED REGISTERED

## 2022-09-30 PROCEDURE — 3600000004 HC SURGERY LEVEL 4 BASE: Performed by: SURGERY

## 2022-09-30 PROCEDURE — C1769 GUIDE WIRE: HCPCS | Performed by: SURGERY

## 2022-09-30 PROCEDURE — 99152 MOD SED SAME PHYS/QHP 5/>YRS: CPT | Performed by: SURGERY

## 2022-09-30 PROCEDURE — 2580000003 HC RX 258: Performed by: SURGERY

## 2022-09-30 PROCEDURE — 7100000010 HC PHASE II RECOVERY - FIRST 15 MIN: Performed by: SURGERY

## 2022-09-30 PROCEDURE — A4217 STERILE WATER/SALINE, 500 ML: HCPCS | Performed by: SURGERY

## 2022-09-30 PROCEDURE — 3600000014 HC SURGERY LEVEL 4 ADDTL 15MIN: Performed by: SURGERY

## 2022-09-30 RX ORDER — FENTANYL CITRATE 50 UG/ML
INJECTION, SOLUTION INTRAMUSCULAR; INTRAVENOUS PRN
Status: DISCONTINUED | OUTPATIENT
Start: 2022-09-30 | End: 2022-09-30 | Stop reason: SDUPTHER

## 2022-09-30 RX ORDER — MIDAZOLAM HYDROCHLORIDE 2 MG/2ML
2 INJECTION, SOLUTION INTRAMUSCULAR; INTRAVENOUS
Status: DISCONTINUED | OUTPATIENT
Start: 2022-09-30 | End: 2022-09-30 | Stop reason: HOSPADM

## 2022-09-30 RX ORDER — MORPHINE SULFATE 4 MG/ML
4 INJECTION, SOLUTION INTRAMUSCULAR; INTRAVENOUS EVERY 5 MIN PRN
Status: DISCONTINUED | OUTPATIENT
Start: 2022-09-30 | End: 2022-09-30 | Stop reason: HOSPADM

## 2022-09-30 RX ORDER — ONDANSETRON 2 MG/ML
4 INJECTION INTRAMUSCULAR; INTRAVENOUS EVERY 8 HOURS PRN
Status: DISCONTINUED | OUTPATIENT
Start: 2022-09-30 | End: 2022-09-30 | Stop reason: HOSPADM

## 2022-09-30 RX ORDER — PROPOFOL 10 MG/ML
INJECTION, EMULSION INTRAVENOUS PRN
Status: DISCONTINUED | OUTPATIENT
Start: 2022-09-30 | End: 2022-09-30 | Stop reason: SDUPTHER

## 2022-09-30 RX ORDER — HYDROCODONE BITARTRATE AND ACETAMINOPHEN 5; 325 MG/1; MG/1
2 TABLET ORAL EVERY 4 HOURS PRN
Status: DISCONTINUED | OUTPATIENT
Start: 2022-09-30 | End: 2022-09-30 | Stop reason: HOSPADM

## 2022-09-30 RX ORDER — LIDOCAINE HYDROCHLORIDE 10 MG/ML
INJECTION, SOLUTION INFILTRATION; PERINEURAL PRN
Status: DISCONTINUED | OUTPATIENT
Start: 2022-09-30 | End: 2022-09-30 | Stop reason: SDUPTHER

## 2022-09-30 RX ORDER — ACETAMINOPHEN 325 MG/1
650 TABLET ORAL EVERY 4 HOURS PRN
Status: DISCONTINUED | OUTPATIENT
Start: 2022-09-30 | End: 2022-09-30 | Stop reason: HOSPADM

## 2022-09-30 RX ORDER — HYDROCODONE BITARTRATE AND ACETAMINOPHEN 5; 325 MG/1; MG/1
1 TABLET ORAL EVERY 4 HOURS PRN
Status: DISCONTINUED | OUTPATIENT
Start: 2022-09-30 | End: 2022-09-30 | Stop reason: HOSPADM

## 2022-09-30 RX ORDER — METOCLOPRAMIDE HYDROCHLORIDE 5 MG/ML
10 INJECTION INTRAMUSCULAR; INTRAVENOUS
Status: DISCONTINUED | OUTPATIENT
Start: 2022-09-30 | End: 2022-09-30 | Stop reason: HOSPADM

## 2022-09-30 RX ORDER — SODIUM CHLORIDE 450 MG/100ML
INJECTION, SOLUTION INTRAVENOUS CONTINUOUS
Status: DISCONTINUED | OUTPATIENT
Start: 2022-09-30 | End: 2022-09-30 | Stop reason: HOSPADM

## 2022-09-30 RX ORDER — ONDANSETRON 2 MG/ML
INJECTION INTRAMUSCULAR; INTRAVENOUS PRN
Status: DISCONTINUED | OUTPATIENT
Start: 2022-09-30 | End: 2022-09-30 | Stop reason: SDUPTHER

## 2022-09-30 RX ORDER — MEPERIDINE HYDROCHLORIDE 25 MG/ML
12.5 INJECTION INTRAMUSCULAR; INTRAVENOUS; SUBCUTANEOUS EVERY 5 MIN PRN
Status: DISCONTINUED | OUTPATIENT
Start: 2022-09-30 | End: 2022-09-30 | Stop reason: HOSPADM

## 2022-09-30 RX ORDER — FAMOTIDINE 20 MG/1
20 TABLET, FILM COATED ORAL ONCE
Status: DISCONTINUED | OUTPATIENT
Start: 2022-09-30 | End: 2022-09-30 | Stop reason: HOSPADM

## 2022-09-30 RX ORDER — LIDOCAINE HYDROCHLORIDE 10 MG/ML
1 INJECTION, SOLUTION EPIDURAL; INFILTRATION; INTRACAUDAL; PERINEURAL
Status: DISCONTINUED | OUTPATIENT
Start: 2022-09-30 | End: 2022-09-30 | Stop reason: HOSPADM

## 2022-09-30 RX ORDER — ROCURONIUM BROMIDE 10 MG/ML
INJECTION, SOLUTION INTRAVENOUS PRN
Status: DISCONTINUED | OUTPATIENT
Start: 2022-09-30 | End: 2022-09-30 | Stop reason: SDUPTHER

## 2022-09-30 RX ORDER — DIPHENHYDRAMINE HYDROCHLORIDE 50 MG/ML
12.5 INJECTION INTRAMUSCULAR; INTRAVENOUS
Status: DISCONTINUED | OUTPATIENT
Start: 2022-09-30 | End: 2022-09-30 | Stop reason: HOSPADM

## 2022-09-30 RX ORDER — CEFAZOLIN SODIUM 1 G/3ML
INJECTION, POWDER, FOR SOLUTION INTRAMUSCULAR; INTRAVENOUS PRN
Status: DISCONTINUED | OUTPATIENT
Start: 2022-09-30 | End: 2022-09-30 | Stop reason: SDUPTHER

## 2022-09-30 RX ORDER — SCOLOPAMINE TRANSDERMAL SYSTEM 1 MG/1
1 PATCH, EXTENDED RELEASE TRANSDERMAL
Status: DISCONTINUED | OUTPATIENT
Start: 2022-09-30 | End: 2022-09-30 | Stop reason: HOSPADM

## 2022-09-30 RX ORDER — MORPHINE SULFATE 2 MG/ML
2 INJECTION, SOLUTION INTRAMUSCULAR; INTRAVENOUS EVERY 5 MIN PRN
Status: DISCONTINUED | OUTPATIENT
Start: 2022-09-30 | End: 2022-09-30 | Stop reason: HOSPADM

## 2022-09-30 RX ADMIN — PHENYLEPHRINE HYDROCHLORIDE 200 MCG: 10 INJECTION INTRAVENOUS at 10:50

## 2022-09-30 RX ADMIN — ROCURONIUM BROMIDE 50 MG: 10 INJECTION, SOLUTION INTRAVENOUS at 10:32

## 2022-09-30 RX ADMIN — CEFAZOLIN SODIUM 2000 MG: 1 INJECTION, POWDER, FOR SOLUTION INTRAMUSCULAR; INTRAVENOUS at 10:39

## 2022-09-30 RX ADMIN — SODIUM CHLORIDE: 4.5 INJECTION, SOLUTION INTRAVENOUS at 09:25

## 2022-09-30 RX ADMIN — FENTANYL CITRATE 100 MCG: 50 INJECTION, SOLUTION INTRAMUSCULAR; INTRAVENOUS at 10:32

## 2022-09-30 RX ADMIN — ONDANSETRON 4 MG: 2 INJECTION INTRAMUSCULAR; INTRAVENOUS at 11:01

## 2022-09-30 RX ADMIN — PROPOFOL 150 MG: 10 INJECTION, EMULSION INTRAVENOUS at 10:32

## 2022-09-30 RX ADMIN — LIDOCAINE HYDROCHLORIDE 50 MG: 10 INJECTION, SOLUTION INFILTRATION; PERINEURAL at 10:32

## 2022-09-30 RX ADMIN — SUGAMMADEX 500 MG: 100 INJECTION, SOLUTION INTRAVENOUS at 11:03

## 2022-09-30 ASSESSMENT — PAIN DESCRIPTION - ORIENTATION: ORIENTATION: LEFT

## 2022-09-30 ASSESSMENT — PAIN - FUNCTIONAL ASSESSMENT: PAIN_FUNCTIONAL_ASSESSMENT: 0-10

## 2022-09-30 ASSESSMENT — ENCOUNTER SYMPTOMS: SHORTNESS OF BREATH: 0

## 2022-09-30 ASSESSMENT — PAIN DESCRIPTION - DESCRIPTORS: DESCRIPTORS: ACHING

## 2022-09-30 ASSESSMENT — PAIN DESCRIPTION - FREQUENCY: FREQUENCY: CONTINUOUS

## 2022-09-30 ASSESSMENT — PAIN SCALES - GENERAL: PAINLEVEL_OUTOF10: 3

## 2022-09-30 ASSESSMENT — LIFESTYLE VARIABLES: SMOKING_STATUS: 0

## 2022-09-30 ASSESSMENT — PAIN DESCRIPTION - LOCATION: LOCATION: ARM

## 2022-09-30 NOTE — DISCHARGE INSTRUCTIONS
1. You can remove the sling on your arm once feeling and strength return to your hand (usually the night of surgery). 2. It is common to have some numbness/tingling in the fingers, bluish/cool figners after this surgery. This is called \"steal.\" If you develop severe pain in the hand or lose strength in the hand then call the office. 3. You may shower after surgery as long as you do not have a dialysis catheter. If you do have a dialysis catheter sponge bath only because the dialysis nurse needs you to keep the catheter clean and dry. 4. There are no special wound care instructions. You have glue over the incisions and stitches underneath. Bruising and discoloration or the area around your incision is normal and will disappear in time. You may also develop a hard \"healing ridge\" under the incision. This is a normal part of the healing process. 5. You may expect some mild swelling in your arm after surgery, especially when you go home. The swelling can be relieved by elevating your arm slightly. If you have severe swelling call the doctor. 6. You should not drive for 2 days after surgery to make sure the anesthesia is out of your system. 7. Squeeze a ball (or toy kidney if dialysis nurses gave you one) 3 times a day with your hand on the surgery side. This helps circulation to your hand and may help your fistula mature quicker. 8. Avoid clothing that may constrict the circulation in your arm, such as tight shirts. 9. If you smoke, STOP! If you do not smoke, DO NOT START! 10. Take pain medication as prescribed. You should also take a baby aspirin or full strength aspirin daily to keep the fistula from clotting off. 6. You will have an appointment with your DrSteven In 2-3 weeks to perform a balloon angioplasty to help the fistula to mature (get bigger so that it can be easily accessed for dialysis)    12.  If you develop any of the problems below, or have any questions, please do not hesitate to call the office. A. New or unusual drainage from the incision   B. Fever/chills   C. Severe swelling at the incision site.    D. Severe pain or weakness in hand   E. Sudden swelling in the arm

## 2022-09-30 NOTE — OP NOTE
Preprocedure diagnosis:  1. End-stage renal disease on hemodialysis  2. Left upper extremity arteriovenous fistula malfunction    Post procedure diagnosis: Same    Procedure:  1. Left upper extremity fistulogram's including venography to the superior vena cava  2. Balloon angioplasty mid upper arm cephalic vein stenosis with 10 mm x 40 mm conquest balloon  3. Completion venograms left upper extremity    Surgeon: Tierra Newby MD    Anesthesia: General    Estimated blood loss: Less than 50 mL    Findings:  1. The patient has a patent left brachial artery to cephalic vein arteriovenous fistula. The arteriovenous anastomosis is widely patent. There are some aneurysms, not pseudoaneurysms, around the areas of puncture but they are not significantly large and the skin overlying this area is supple. There is a moderate stenosis just proximal to the aneurysm. There is another moderate stenosis several centimeters away from this in the mid upper arm cephalic vein. The left subclavian vein, innominate vein, superior vena cava are all widely patent. Procedure in detail:    After the patient was consented and given intravenous antibiotics, she is brought to the hybrid operating room placed on the table supine position. General anesthesia was achieved the patient's left arm was prepped and draped in usual sterile procedure. Micropuncture needle was used to cannulate the cephalic vein near the arteriovenous anastomosis. Seldinger technique was utilized place a 4 Western Aaliyah glide sheath. Left upper extremity fistulogram's including venography to the superior vena cava were performed with above findings. I upsized to a 7 Western Aaliyah sheath and then over an 035 wire, balloon angioplasty was performed of the 2 cephalic vein stenoses with a 10 mm x 40 mm conquest balloon. These balloons were left inflated for 1 minute each.   Because they were focal, I felt like we did not need a drug-coated balloon angioplasty in addition. Completion left upper extremity venogram shows a very good result with no significant residual stenosis nor extravasation of dye. The sheath was removed and pressure applied for hemostasis. The patient tolerated the procedure well. She was awakened from general anesthesia and brought to the recovery room in good condition.

## 2022-09-30 NOTE — ANESTHESIA POSTPROCEDURE EVALUATION
Department of Anesthesiology  Postprocedure Note    Patient: Venus Love  MRN: [id number]  YOB: 1960  Date of evaluation: 9/30/2022      Procedure Summary     Date: 09/30/22 Room / Location: [facility] OR [address]    Anesthesia Start: 1025 Anesthesia Stop: 1115    Procedure: LEFT UPPER EXTREMITY AV FISTULAGRAM, POSSIBLE BALLOON ANGIOPLASTY, STENT POSS REVISION (Left) Diagnosis:       End stage renal disease ([address]      Dependence on hemodialysis ([address]      Mechanical complication of arteriovenous fistula surgically created, initial encounter ([address]      (End stage renal disease ([address] [N18.6])      (Dependence on hemodialysis ([address] [Z99.2])      (Mechanical complication of arteriovenous fistula surgically created, initial encounter ([address] Teofilo Cramp)    Surgeons: Cosme Hung MD Responsible Provider: JAMES [place] - SERA    Anesthesia Type: general ASA Status: 4          Anesthesia Type: No value filed.     No Phase I: No Score: 9    No Phase II:        Anesthesia Post Evaluation    Patient location during evaluation: PACU  Patient participation: complete - patient participated  Level of consciousness: sleepy but conscious  Pain score: 0  Airway patency: patent  Nausea & Vomiting: no nausea and no vomiting  Complications: no  Cardiovascular status: hemodynamically stable  Respiratory status: acceptable, spontaneous ventilation and room air  Hydration status: euvolemic

## 2022-09-30 NOTE — ANESTHESIA PRE PROCEDURE
Department of Anesthesiology  Preprocedure Note       Name:  Mic Gutierrez   Age:  58 y.o.  :  1960                                          MRN:  [id number]         Date:  2022      Surgeon: Lashaun Drew):  Oniel Dewitt MD    Procedure: Procedure(s):  LEFT UPPER EXTREMITY AV FISTULAGRAM, POSSIBLE BALLOON ANGIOPLASTY, STENT POSS REVISION  POSSIBLE PERM CATHETER PLACEMENT    Medications prior to admission:   Prior to Admission medications    Medication Sig Start Date End Date Taking?  Authorizing Provider   Semaglutide,0.25 or 0.5MG/DOS, (OZEMPIC, 0.25 OR 0.5 MG/DOSE,) 2 MG/1.5ML SOPN Inject 2 mg into the skin once a week    Historical Provider, MD   Sucroferric Oxyhydroxide (VELPHORO) 500 MG CHEW Take 2 tablets by mouth 3 times daily (before meals)    Historical Provider, MD   buPROPion (WELLBUTRIN SR) 150 MG extended release tablet Take 150 mg by mouth 2 times daily    Historical Provider, MD   ondansetron (ZOFRAN ODT) 4 MG disintegrating tablet Take 1 tablet by mouth every 8 hours as needed for Nausea or Vomiting 22   Nancy Bravo MD   Insulin Regular Human (HUMULIN R U-500 KWIKPEN SC) Inject into the skin OMNI POD 1 UNIT PER HOUR AND BOLUS 25 UNITS Q AM, 15 UNITS AT HS    Historical Provider, MD   potassium chloride (KLOR-CON) 20 MEQ packet Take 20 mEq by mouth daily    Historical Provider, MD   vitamin C (ASCORBIC ACID) 500 MG tablet Take 500 mg by mouth 2 times daily    Historical Provider, MD   carvedilol (COREG) 6.25 MG tablet Take 6.25 mg by mouth 2 times daily (with meals)    Historical Provider, MD   docusate sodium (COLACE) 100 MG capsule Take 200 mg by mouth 4 times daily    Historical Provider, MD   calcitRIOL (ROCALTROL) 0.5 MCG capsule Take 1.5 mcg by mouth daily    Historical Provider, MD   Evolocumab (REPATHA SC) Inject into the skin every 14 days    Historical Provider, MD   levothyroxine (SYNTHROID) 88 MCG tablet Take 88 mcg by mouth Daily  21   Historical Provider, MD rosuvastatin (CRESTOR) 20 MG tablet Take 20 mg by mouth daily  5/31/21   Historical Provider, MD   cetirizine (ZYRTEC) 10 MG tablet Take 10 mg by mouth as needed for Allergies     Historical Provider, MD   diazePAM (VALIUM) 2 MG tablet Take 2 mg by mouth as needed for Anxiety. Historical Provider, MD   fluticasone [facility]) 50 MCG/ACT nasal spray 2 sprays by Nasal route daily as needed for Rhinitis  3/22/21   Historical Provider, MD   epoetin jose ramon (EPOGEN;PROCRIT) 00060 UNIT/ML injection Inject 10,000 Units into the skin every 14 days    Historical Provider, MD   lamoTRIgine (LAMICTAL) 100 MG tablet Take 100 mg by mouth nightly    Historical Provider, MD   allopurinol (ZYLOPRIM) 100 MG tablet Take 100 mg by mouth 2 times daily    Historical Provider, MD   citalopram (CELEXA) 40 MG tablet Take 40 mg by mouth daily    Historical Provider, MD   busPIRone (BUSPAR) 10 MG tablet Take 20 mg by mouth 2 times daily    Historical Provider, MD   aspirin 81 MG tablet Take 81 mg by mouth daily With [address] Provider, MD       Current medications:    Current Facility-Administered Medications   Medication Dose Route Frequency Provider Last Rate Last Admin    0.45 % sodium chloride infusion   IntraVENous Continuous Kenej Hall  mL/hr at 09/30/22 0925 New Bag at 09/30/22 0925       Allergies:     Allergies   Allergen Reactions    Codeine Nausea And Vomiting     N/v/felt hot       Problem List:    Patient Active Problem List   Diagnosis Code    Mor[facility] obesity ([facility] E66.01    Thyroid disease E07.9    Sleep apnea G47.30    Mood disorder ([facility] F39    Hypertension associated with diabetes ([facility] E11.59, I15.2    Anemia of chronic renal failure N18.9, D63.1    Type 2 DM with CKD stage 3 and hypertension (HCC) E11.22, I12.9, N18.30    Hypertriglyceridemia E78.1    Anxiety F41.9    Embolism (HCC) I74.9    Vitamin D deficiency E55.9    Anemia D64.9    Hypocalcemia E83.51    Allergic rhinitis with postnasal drip J30.9, R09.82    B12 deficiency E53.8    Deep vein thrombosis of lower extremity (HCC) I82.409    Disease of liver K76.9    Mixed diabetic hyperlipidemia associated with type 2 diabetes mellitus (HCC) E11.69, E78.2    ESRD on dialysis ([facility] N18.6, Z99.2    Acute hemodialysis encounter ([facility] Z99.2    Acute hypoxemic respiratory failure (HCC) J96.01    Diabetes ([facility] E11.9    Hypertension I10    Hypoxia R09.02    Pyelonephritis N12       Past Medical History:        Diagnosis Date    Anxiety     Arthritis     CHF (congestive heart failure) (HCC)     CKD (chronic kidney disease)     stage 4    Colon polyp     Depression     Embolism - blood clot 2004    Hemodialysis patient ([facility]     dialysis mon - friday x 2 weeks.     Hx of blood clots     Hyperlipidemia     Hypertension     Obesity     Sleep apnea     CPAP    Thyroid disease     Type II or unspecified type diabetes mellitus without mention of complication, not stated as uncontrolled        Past Surgical History:        Procedure Laterality Date    CHOLECYSTECTOMY      DIALYSIS CATHETER INSERTION N/A 8/11/2021    INSERTION CATHETER DIALYSIS performed by Cass Tate MD at [address] [place] [address] Left 06/18/2021    LEFT BRACHIAL-CEPHALIC AV FISTULA CREATION performed by Cass Tate MD at [address] [place] [address] Left 7/27/2021    LEFT UPPER EXTREMITY CEPHALIC VEIN SUPERFICIALIZATION performed by Cass Tate MD at [facility] (CERVIX STATUS UNKNOWN)  10/17/2018    POLYPECTOMY      RECTAL SURGERY      fistula repair    TONSILLECTOMY AND ADENOIDECTOMY      VASCULAR SURGERY  07/12/2021    [facility]- Ultrasound-guided cannulation left distal upper arm cephalic vein with 4 [place] glide sheath, Left upper extremity fistulogram's including venography the superior vena cava    VASCULAR SURGERY  07/27/2021    [facility]- Superficialization of the left upper arm cephalic vein arteriovenous fistula       Social History:    Social History     Tobacco Use    Smoking status: Never    Smokeless tobacco: Never   Substance Use Topics    Alcohol use: No                                Counseling given: Not Answered      Vital Signs (Current):   Vitals:    09/30/22 0923   BP: (!) 143/63   Pulse: 81   Resp: 16   Temp: 97.5 °F (36.4 °C)   TempSrc: Tympanic   SpO2: 99%   Weight: (!) 319 lb (144.7 kg)   Height: 5' 5\" (1.651 m)                                              BP Readings from Last 3 Encounters:   09/30/22 (!) 143/63   09/13/22 134/66   09/11/22 124/60       NPO Status: Time of last liquid consumption: 0000                        Time of last solid consumption: 0000                        Date of last liquid consumption: 09/29/22                        Date of last solid food consumption: 09/29/22    BMI:   Wt Readings from Last 3 Encounters:   09/30/22 (!) 319 lb (144.7 kg)   09/23/22 (!) 319 lb 10.7 oz (145 kg)   09/11/22 (!) 324 lb (147 kg)     Body mass index is 53.08 kg/m².     CBC:   Lab Results   Component Value Date/Time    WBC 12.2 09/30/2022 08:50 AM    RBC 2.64 09/30/2022 08:50 AM    HGB 9.3 09/30/2022 08:50 AM    HCT 29.0 09/30/2022 08:50 AM    HCT 30.9 05/23/2012 11:55 AM    .8 09/30/2022 08:50 AM    RDW 15.6 09/30/2022 08:50 AM     09/30/2022 08:50 AM     05/23/2012 11:55 AM       CMP:   Lab Results   Component Value Date/Time     09/11/2022 12:54 PM     05/23/2012 11:55 AM    K 3.9 09/11/2022 12:54 PM    K 3.8 06/27/2022 06:19 AM    K 4.4 05/23/2012 11:55 AM    CL 87 09/11/2022 12:54 PM     05/23/2012 11:55 AM    CO2 27 09/11/2022 12:54 PM    BUN 63 09/11/2022 12:54 PM    CREATININE 6.5 09/11/2022 12:54 PM    CREATININE 1.9 05/23/2012 11:55 AM    GFRAA 8 09/11/2022 12:54 PM    LABGLOM 6 09/11/2022 12:54 PM    GLUCOSE 181 09/11/2022 12:54 PM    PROT 8.6 09/11/2022 12:54 PM    PROT 6.8 01/09/2013 10:44 AM CALCIUM 10.6 09/11/2022 12:54 PM    BILITOT 0.4 09/11/2022 12:54 PM    ALKPHOS 99 09/11/2022 12:54 PM    ALKPHOS 74 10/07/2011 03:00 PM    AST 11 09/11/2022 12:54 PM    ALT 10 09/11/2022 12:54 PM       POC Tests: No results for input(s): POCGLU, POCNA, POCK, POCCL, POCBUN, POCHEMO, POCHCT in the last 72 hours. Coags:   Lab Results   Component Value Date/Time    PROTIME 12.6 09/30/2022 08:50 AM    PROTIME 16.08 02/29/2012 10:38 AM    INR 0.95 09/30/2022 08:50 AM    APTT 26.5 09/30/2022 08:50 AM       HCG (If Applicable): No results found for: PREGTESTUR, PREGSERUM, HCG, HCGQUANT     ABGs: No results found for: PHART, PO2ART, NHE6WXS, CUX6UAK, BEART, O4OJKIJH     Type & Screen (If Applicable):  No results found for: LABABO, LABRH    Drug/Infectious Status (If Applicable):  No results found for: HIV, HEPCAB    COVID-19 Screening (If Applicable):   Lab Results   Component Value Date/Time    COVID19 Not Detected 06/25/2022 05:12 PM           Anesthesia Evaluation  Patient summary reviewed and Nursing notes reviewed no history of anesthetic complications:   Airway: Mallampati: II  TM distance: >3 FB   Neck ROM: full  Comment: Small mouth   Mouth opening: > = 3 FB   Dental: normal exam         Pulmonary:Negative Pulmonary ROS and normal exam  breath sounds clear to auscultation  (+) sleep apnea:      (-) shortness of breath and not a current smoker          Patient did not smoke on day of surgery.                  Cardiovascular:    (+) hypertension:, CHF:,     (-) CAD and  angina    NYHA Classification: I  ECG reviewed  Rhythm: regular  Rate: normal           Beta Blocker:  Not on Beta Blocker         Neuro/Psych:   Negative Neuro/Psych ROS  (+) psychiatric history:   (-) seizures, CVA and depression/anxiety            GI/Hepatic/Renal: Neg GI/Hepatic/Renal ROS  (+) liver disease:, morbid obesity     (-) hiatal hernia and GERD       Endo/Other: Negative Endo/Other ROS   (+) DiabetesType II DM, , blood dyscrasia::., electrolyte abnormalities, . Pt had PAT visit. Abdominal:   (+) obese,     Abdomen: soft. Vascular: Other Findings:           Anesthesia Plan      general     ASA 4     (Iv zofran within 30 min of closing )  Induction: intravenous. BIS  MIPS: Postoperative opioids intended and Prophylactic antiemetics administered. Anesthetic plan and risks discussed with patient. Use of blood products discussed with patient whom. Plan discussed with CRNA.     Attending anesthesiologist reviewed and agrees with Pre Eval content                Michi Nevarez MD   9/30/2022

## 2022-10-10 ENCOUNTER — DOCUMENTATION (OUTPATIENT)
Dept: ENDOCRINOLOGY | Facility: CLINIC | Age: 62
End: 2022-10-10

## 2022-10-12 ENCOUNTER — DOCUMENTATION (OUTPATIENT)
Dept: ENDOCRINOLOGY | Facility: CLINIC | Age: 62
End: 2022-10-12

## 2022-10-12 RX ORDER — INSULIN PMP CART,AUT,G6/7,CNTR
1 EACH SUBCUTANEOUS ONCE
Qty: 1 KIT | Refills: 0 | Status: SHIPPED | OUTPATIENT
Start: 2022-10-12 | End: 2022-10-12

## 2022-10-12 RX ORDER — INSULIN PMP CART,AUT,G6/7,CNTR
1 EACH SUBCUTANEOUS
Qty: 15 EACH | Refills: 6 | Status: SHIPPED | OUTPATIENT
Start: 2022-10-12

## 2022-10-19 DIAGNOSIS — F41.8 MIXED ANXIETY AND DEPRESSIVE DISORDER: ICD-10-CM

## 2022-10-19 RX ORDER — LAMOTRIGINE 100 MG/1
TABLET ORAL
Qty: 30 TABLET | Refills: 1 | Status: SHIPPED | OUTPATIENT
Start: 2022-10-19

## 2022-10-21 ENCOUNTER — APPOINTMENT (OUTPATIENT)
Dept: GENERAL RADIOLOGY | Age: 62
DRG: 871 | End: 2022-10-21
Payer: MEDICARE

## 2022-10-21 ENCOUNTER — APPOINTMENT (OUTPATIENT)
Dept: CT IMAGING | Age: 62
DRG: 871 | End: 2022-10-21
Payer: MEDICARE

## 2022-10-21 ENCOUNTER — HOSPITAL ENCOUNTER (INPATIENT)
Age: 62
LOS: 5 days | Discharge: HOME HEALTH | DRG: 871 | End: 2022-10-26
Attending: PEDIATRICS | Admitting: HOSPITALIST
Payer: MEDICARE

## 2022-10-21 DIAGNOSIS — K59.00 CONSTIPATION, UNSPECIFIED CONSTIPATION TYPE: ICD-10-CM

## 2022-10-21 DIAGNOSIS — K52.9 COLITIS: Primary | ICD-10-CM

## 2022-10-21 DIAGNOSIS — D72.829 LEUKOCYTOSIS, UNSPECIFIED TYPE: ICD-10-CM

## 2022-10-21 DIAGNOSIS — K56.41 FECAL IMPACTION IN RECTUM (HCC): ICD-10-CM

## 2022-10-21 PROBLEM — A41.9 SEPSIS (HCC): Status: ACTIVE | Noted: 2022-10-21

## 2022-10-21 LAB
ACETONE BLOOD: NEGATIVE
ALBUMIN SERPL-MCNC: 4.4 G/DL (ref 3.5–5.2)
ALP BLD-CCNC: 93 U/L (ref 35–104)
ALT SERPL-CCNC: 10 U/L (ref 5–33)
ANION GAP SERPL CALCULATED.3IONS-SCNC: 24 MMOL/L (ref 7–19)
AST SERPL-CCNC: 15 U/L (ref 5–32)
BASE EXCESS VENOUS: 4 MMOL/L
BASOPHILS ABSOLUTE: 0.1 K/UL (ref 0–0.2)
BASOPHILS RELATIVE PERCENT: 0.4 % (ref 0–1)
BILIRUB SERPL-MCNC: 0.4 MG/DL (ref 0.2–1.2)
BUN BLDV-MCNC: 39 MG/DL (ref 8–23)
CALCIUM SERPL-MCNC: 10.4 MG/DL (ref 8.8–10.2)
CARBOXYHEMOGLOBIN: 0.5 %
CHLORIDE BLD-SCNC: 88 MMOL/L (ref 98–111)
CO2: 23 MMOL/L (ref 22–29)
CREAT SERPL-MCNC: 6.9 MG/DL (ref 0.5–0.9)
EOSINOPHILS ABSOLUTE: 0.2 K/UL (ref 0–0.6)
EOSINOPHILS RELATIVE PERCENT: 0.9 % (ref 0–5)
GFR SERPL CREATININE-BSD FRML MDRD: 6 ML/MIN/{1.73_M2}
GLUCOSE BLD-MCNC: 185 MG/DL (ref 74–109)
HCO3 VENOUS: 27 MMOL/L (ref 23–29)
HCT VFR BLD CALC: 31.3 % (ref 37–47)
HEMOGLOBIN: 10.1 G/DL (ref 12–16)
IMMATURE GRANULOCYTES #: 0.8 K/UL
LACTIC ACID: 3.7 MMOL/L (ref 0.5–1.9)
LYMPHOCYTES ABSOLUTE: 1.6 K/UL (ref 1.1–4.5)
LYMPHOCYTES RELATIVE PERCENT: 7.3 % (ref 20–40)
MCH RBC QN AUTO: 35.3 PG (ref 27–31)
MCHC RBC AUTO-ENTMCNC: 32.3 G/DL (ref 33–37)
MCV RBC AUTO: 109.4 FL (ref 81–99)
METHEMOGLOBIN VENOUS: 0 %
MONOCYTES ABSOLUTE: 0.7 K/UL (ref 0–0.9)
MONOCYTES RELATIVE PERCENT: 3.3 % (ref 0–10)
NEUTROPHILS ABSOLUTE: 18 K/UL (ref 1.5–7.5)
NEUTROPHILS RELATIVE PERCENT: 84.4 % (ref 50–65)
O2 CONTENT, VEN: 8 ML/DL
O2 SAT, VEN: 58 %
PCO2, VEN: 32 MMHG (ref 40–50)
PDW BLD-RTO: 16 % (ref 11.5–14.5)
PH VENOUS: 7.53 (ref 7.35–7.45)
PLATELET # BLD: 319 K/UL (ref 130–400)
PMV BLD AUTO: 8.9 FL (ref 9.4–12.3)
PO2, VEN: 27 MMHG
POTASSIUM SERPL-SCNC: 4.2 MMOL/L (ref 3.5–5)
RBC # BLD: 2.86 M/UL (ref 4.2–5.4)
SARS-COV-2, NAAT: NOT DETECTED
SODIUM BLD-SCNC: 135 MMOL/L (ref 136–145)
TOTAL PROTEIN: 8.3 G/DL (ref 6.6–8.7)
WBC # BLD: 21.4 K/UL (ref 4.8–10.8)

## 2022-10-21 PROCEDURE — 82009 KETONE BODYS QUAL: CPT

## 2022-10-21 PROCEDURE — 36415 COLL VENOUS BLD VENIPUNCTURE: CPT

## 2022-10-21 PROCEDURE — 83605 ASSAY OF LACTIC ACID: CPT

## 2022-10-21 PROCEDURE — 85025 COMPLETE CBC W/AUTO DIFF WBC: CPT

## 2022-10-21 PROCEDURE — 80053 COMPREHEN METABOLIC PANEL: CPT

## 2022-10-21 PROCEDURE — 74022 RADEX COMPL AQT ABD SERIES: CPT

## 2022-10-21 PROCEDURE — P9612 CATHETERIZE FOR URINE SPEC: HCPCS

## 2022-10-21 PROCEDURE — 99285 EMERGENCY DEPT VISIT HI MDM: CPT

## 2022-10-21 PROCEDURE — 36600 WITHDRAWAL OF ARTERIAL BLOOD: CPT

## 2022-10-21 PROCEDURE — 6370000000 HC RX 637 (ALT 250 FOR IP): Performed by: PHYSICIAN ASSISTANT

## 2022-10-21 PROCEDURE — 1210000000 HC MED SURG R&B

## 2022-10-21 PROCEDURE — 87040 BLOOD CULTURE FOR BACTERIA: CPT

## 2022-10-21 PROCEDURE — 84145 PROCALCITONIN (PCT): CPT

## 2022-10-21 PROCEDURE — 74176 CT ABD & PELVIS W/O CONTRAST: CPT

## 2022-10-21 PROCEDURE — 87635 SARS-COV-2 COVID-19 AMP PRB: CPT

## 2022-10-21 PROCEDURE — 74022 RADEX COMPL AQT ABD SERIES: CPT | Performed by: RADIOLOGY

## 2022-10-21 RX ORDER — MINERAL OIL 100 G/100G
1 OIL RECTAL DAILY PRN
Status: DISCONTINUED | OUTPATIENT
Start: 2022-10-21 | End: 2022-10-26 | Stop reason: HOSPADM

## 2022-10-21 RX ORDER — SODIUM PHOSPHATE, DIBASIC AND SODIUM PHOSPHATE, MONOBASIC 7; 19 G/133ML; G/133ML
1 ENEMA RECTAL NIGHTLY
Qty: 3 EACH | Refills: 0 | Status: SHIPPED | OUTPATIENT
Start: 2022-10-21 | End: 2022-10-24

## 2022-10-21 RX ORDER — MINERAL OIL 100 G/100G
1 OIL RECTAL PRN
Status: DISCONTINUED | OUTPATIENT
Start: 2022-10-21 | End: 2022-10-21

## 2022-10-21 RX ORDER — 0.9 % SODIUM CHLORIDE 0.9 %
250 INTRAVENOUS SOLUTION INTRAVENOUS ONCE
Status: DISCONTINUED | OUTPATIENT
Start: 2022-10-21 | End: 2022-10-26 | Stop reason: HOSPADM

## 2022-10-21 RX ORDER — METRONIDAZOLE 500 MG/100ML
500 INJECTION, SOLUTION INTRAVENOUS EVERY 8 HOURS
Status: DISCONTINUED | OUTPATIENT
Start: 2022-10-22 | End: 2022-10-26 | Stop reason: HOSPADM

## 2022-10-21 RX ORDER — CIPROFLOXACIN 2 MG/ML
400 INJECTION, SOLUTION INTRAVENOUS ONCE
Status: COMPLETED | OUTPATIENT
Start: 2022-10-21 | End: 2022-10-22

## 2022-10-21 RX ORDER — CIPROFLOXACIN 2 MG/ML
400 INJECTION, SOLUTION INTRAVENOUS EVERY 24 HOURS
Status: DISCONTINUED | OUTPATIENT
Start: 2022-10-22 | End: 2022-10-26 | Stop reason: HOSPADM

## 2022-10-21 RX ORDER — DOCUSATE SODIUM 100 MG/1
100 CAPSULE, LIQUID FILLED ORAL 2 TIMES DAILY
Qty: 60 CAPSULE | Refills: 0 | Status: SHIPPED | OUTPATIENT
Start: 2022-10-21

## 2022-10-21 RX ORDER — ONDANSETRON 4 MG/1
4 TABLET, ORALLY DISINTEGRATING ORAL ONCE
Status: DISCONTINUED | OUTPATIENT
Start: 2022-10-21 | End: 2022-10-26 | Stop reason: HOSPADM

## 2022-10-21 RX ORDER — METRONIDAZOLE 500 MG/100ML
500 INJECTION, SOLUTION INTRAVENOUS ONCE
Status: COMPLETED | OUTPATIENT
Start: 2022-10-21 | End: 2022-10-22

## 2022-10-21 RX ORDER — SODIUM PHOSPHATE, DIBASIC AND SODIUM PHOSPHATE, MONOBASIC 7; 19 G/133ML; G/133ML
1 ENEMA RECTAL
Status: COMPLETED | OUTPATIENT
Start: 2022-10-21 | End: 2022-10-21

## 2022-10-21 RX ADMIN — SODIUM PHOSPHATE, DIBASIC AND SODIUM PHOSPHATE, MONOBASIC, UNSPECIFIED FORM 1 ENEMA: 7; 19 ENEMA RECTAL at 19:00

## 2022-10-21 ASSESSMENT — ENCOUNTER SYMPTOMS
RHINORRHEA: 0
SHORTNESS OF BREATH: 0
CONSTIPATION: 1
COUGH: 0
NAUSEA: 0
PHOTOPHOBIA: 0
ABDOMINAL PAIN: 0
COLOR CHANGE: 0
ABDOMINAL DISTENTION: 0
BACK PAIN: 0
SORE THROAT: 0
APNEA: 0
EYE DISCHARGE: 0
EYE PAIN: 0

## 2022-10-21 NOTE — ED PROVIDER NOTES
[facility] 3 JASON/VAS/MED  eMERGENCYdEPARTMENT eNCOUnter      Pt Name: Bharat Orozco  MRN: [id number]  [place] 1960  Date of evaluation: 10/21/2022  Provider:ЕЛЕНА Louie    CHIEF COMPLAINT       Chief Complaint   Patient presents with    Constipation     Constipated for 4 days. Taking stool softeners, miralax, and other laxatives, reports attempting manual disimpaction with no relief. HISTORY OF PRESENT ILLNESS  (Location/Symptom, Timing/Onset, Context/Setting, Quality, Duration, Modifying Factors, Severity.)   Bharat Orozco is a 58 y.o. female who presents to the emergency department with complaints of constipation x 4 days multiple attempts OTC meds to go. She is making flatus. Morbid obesity rectal discomfort. Denies abdominal pain. Mild nausea no emesis. Deals with this baseline. Hemodialyzes 5 nights a week has been an issue with dialysis since she started this. No abdominal pain. Has rectal fullness. No other complaints. HPI    Nursing Notes were reviewed and I agree. REVIEW OF SYSTEMS    (2-9 systems for level 4, 10 or more for level 5)     Review of Systems   Constitutional:  Negative for activity change, appetite change, chills and fever. HENT:  Negative for congestion, postnasal drip, rhinorrhea and sore throat. Eyes:  Negative for photophobia, pain, discharge and visual disturbance. Respiratory:  Negative for apnea, cough and shortness of breath. Cardiovascular:  Negative for chest pain and leg swelling. Gastrointestinal:  Positive for constipation. Negative for abdominal distention, abdominal pain and nausea. Genitourinary:  Negative for vaginal bleeding. Musculoskeletal:  Negative for arthralgias, back pain, joint swelling, neck pain and neck stiffness. Skin:  Negative for color change and rash. Neurological:  Negative for dizziness, syncope, facial asymmetry and headaches. Hematological:  Negative for adenopathy. Does not bruise/bleed easily. Psychiatric/Behavioral:  Negative for agitation, behavioral problems and confusion. Except as noted above the remainder of the review of systems was reviewed and negative.        PAST MEDICAL HISTORY     Past Medical History:   Diagnosis Date    Anxiety     Arthritis     CHF (congestive heart failure) (HCC)     CKD (chronic kidney disease)     stage 4    Colon polyp     Depression     Embolism - blood clot 2004    Hemodialysis patient ([facility] [address]     5 days a week at home, takes Sunday + one other day off each week    Hx of blood clots     Hyperlipidemia     Hypertension     Obesity, morbid, BMI 50 or higher ([facility] [address]     Sleep apnea     CPAP    Thyroid disease     Type II or unspecified type diabetes mellitus without mention of complication, not stated as uncontrolled          SURGICAL HISTORY       Past Surgical History:   Procedure Laterality Date    CHOLECYSTECTOMY, LAPAROSCOPIC  1986    DIALYSIS CATHETER INSERTION N/A 08/11/2021    INSERTION CATHETER DIALYSIS performed by José Antonio Manriquez MD at [place] [address] Left 06/18/2021    LEFT BRACHIAL-CEPHALIC AV FISTULA CREATION performed by José Antonio Manriquez MD at [place] [address] Left 07/27/2021    LEFT UPPER EXTREMITY CEPHALIC VEIN SUPERFICIALIZATION performed by José Antonio Manriquez MD at [address] [place] [address]  2005    FISTULAGRAM Left 09/30/2022    LEFT UPPER EXTREMITY AV FISTULAGRAM, POSSIBLE BALLOON ANGIOPLASTY, STENT POSS REVISION performed by José Antonio Manriquez MD at [address] [place] [address] ([address] [place] [address])  10/17/2018    POLYPECTOMY      ~2012 had colon polyps, 2022 had a benign polyp and 2 possibly cancerous polyps, DUE FOR REPEAT IN DEC 2022    RECTAL SURGERY  1981    fistula repair    TONSILLECTOMY AND ADENOIDECTOMY      at age 12 years    VASCULAR SURGERY  07/12/2021    [facility]- Ultrasound-guided cannulation left distal upper arm cephalic vein with 4 Turkmen glide sheath, Left upper extremity fistulogram's including venography the superior vena cava    VASCULAR SURGERY  07/27/2021    [facility]- Superficialization of the left upper arm cephalic vein arteriovenous fistula         CURRENT MEDICATIONS       Current Discharge Medication List        CONTINUE these medications which have NOT CHANGED    Details   Semaglutide,0.25 or 0.5MG/DOS, (OZEMPIC, 0.25 OR 0.5 MG/DOSE,) 2 MG/1.5ML SOPN Inject 2 mg into the skin once a week      Sucroferric Oxyhydroxide (VELPHORO) 500 MG CHEW Take 2 tablets by mouth 3 times daily (before meals)      buPROPion (WELLBUTRIN SR) 150 MG extended release tablet Take 150 mg by mouth 2 times daily      ondansetron (ZOFRAN ODT) 4 MG disintegrating tablet Take 1 tablet by mouth every 8 hours as needed for Nausea or Vomiting  Qty: 12 tablet, Refills: 0      Insulin Regular Human (HUMULIN R U-500 KWIKPEN SC) Inject into the skin OMNI POD 1 UNIT PER HOUR AND BOLUS 25 UNITS Q AM, 15 UNITS AT HS      potassium chloride (KLOR-CON) 20 MEQ packet Take 20 mEq by mouth daily      vitamin C (ASCORBIC ACID) 500 MG tablet Take 500 mg by mouth 2 times daily      carvedilol (COREG) 6.25 MG tablet Take 6.25 mg by mouth 2 times daily (with meals)      calcitRIOL (ROCALTROL) 0.5 MCG capsule Take 1.5 mcg by mouth daily      Evolocumab (REPATHA SC) Inject into the skin every 14 days      levothyroxine (SYNTHROID) 88 MCG tablet Take 88 mcg by mouth Daily       rosuvastatin (CRESTOR) 20 MG tablet Take 20 mg by mouth daily       cetirizine (ZYRTEC) 10 MG tablet Take 10 mg by mouth as needed for Allergies       diazePAM (VALIUM) 2 MG tablet Take 2 mg by mouth as needed for Anxiety.        fluticasone (FLONASE) 50 MCG/ACT nasal spray 2 sprays by Nasal route daily as needed for Rhinitis       epoetin jose ramon (EPOGEN;PROCRIT) 71175 UNIT/ML injection Inject 10,000 Units into the skin every 14 days      lamoTRIgine (LAMICTAL) 100 MG tablet Take 100 mg by mouth nightly      allopurinol (ZYLOPRIM) 100 MG tablet Take 100 mg by mouth 2 times daily      citalopram (CELEXA) 40 MG tablet Take 40 mg by mouth daily      busPIRone (BUSPAR) 10 MG tablet Take 20 mg by mouth 2 times daily      aspirin 81 MG tablet Take 81 mg by mouth daily With Dinner             ALLERGIES     Codeine    FAMILY HISTORY       Family History   Problem Relation Age of Onset    Lung Cancer Mother     Brain Cancer Mother     Heart Attack Father     Prostate Cancer Father     Heart Disease Father     Stroke Father     Obesity Father     No Known Problems Brother     Uterine Cancer Maternal Grandmother     Cervical Cancer Maternal Grandmother     Diabetes Maternal Grandfather     Other Maternal Grandfather         histoplasmosis    Obesity Paternal Grandmother     Diabetes Paternal Grandfather     Kidney Disease Paternal Grandfather     Other Daughter         Touragnes, Elinor Russell Tabes syndrome          SOCIAL HISTORY       Social History     Socioeconomic History    Marital status:      Spouse name: Mr. 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 Juana Reynaga, Her , [phone]    AMBULATES: uses a wheel chair when out, walks at home independently    DOMICILED: has stairs in her home to the [facility] and washer which she does not use       SCREENINGS    [place] Coma Scale  Eye Opening: Spontaneous  Best Verbal Response: Oriented  Best Motor Response: Obeys commands  [place] Coma Scale Score: 15      PHYSICAL EXAM    (up to 7 forlevel 4, 8 or more for level 5)     ED Triage Vitals [10/21/22 1341]   BP Temp Temp Source Heart Rate Resp SpO2 Height Weight   (!) 98/53 99.1 °F (37.3 °C) Oral 90 20 100 % 5' 5\" (1.651 m) (!) 315 lb 4.1 oz (143 kg)       Physical Exam  Vitals and nursing note reviewed. Constitutional:       General: She is not in acute distress. Appearance: She is well-developed. She is not diaphoretic. HENT:      Head: Normocephalic and atraumatic. Right Ear: External ear normal.      Left Ear: External ear normal.      Mouth/Throat:      Pharynx: No oropharyngeal exudate. Eyes:      General:         Right eye: No discharge. Left eye: No discharge. Pupils: Pupils are equal, round, and reactive to light. Neck:      Thyroid: No thyromegaly. Cardiovascular:      Rate and Rhythm: Normal rate and regular rhythm. Heart sounds: Normal heart sounds. No murmur heard. No friction rub. Pulmonary:      Effort: Pulmonary effort is normal. No respiratory distress. Breath sounds: Normal breath sounds. No stridor. No wheezing. Abdominal:      General: Abdomen is flat. Bowel sounds are normal. There is no distension. Palpations: Abdomen is soft. Tenderness: There is no abdominal tenderness. Musculoskeletal:         General: Normal range of motion. Cervical back: Normal range of motion and neck supple. Skin:     General: Skin is warm and dry. Capillary Refill: Capillary refill takes less than 2 seconds. Findings: No rash. Neurological:      Mental Status: She is alert and oriented to person, place, and time. Cranial Nerves: No cranial nerve deficit. Sensory: No sensory deficit. Coordination: Coordination normal.   Psychiatric:         Behavior: Behavior normal.         Thought Content:  Thought content normal.         DIAGNOSTIC RESULTS     RADIOLOGY:   Non-plain film images such as CT, Ultrasound and MRI are read by the radiologist. Plain radiographic images are visualized and preliminarilyinterpreted by Grzegorz Botello MD with the below findings:      Interpretation per the Radiologist below, if available at the time of this note:    CT ABDOMEN PELVIS WO CONTRAST Additional Contrast? None   Final Result   Findings concerning for colitis of the transverse, descending and sigmoid colon with proctitis, which may be of infectious or inflammatory etiologies. Electronically signed by Verona Moritz, MD on 10-21-22 at 2325      XR ACUTE ABD SERIES CHEST 1 VW   Final Result   1. Nonspecific nonobstructive bowel gas pattern. Liver appears enlarged in size   2. No adverse interval change. Recommendation: Follow up as clinically indicated.    Electronically Signed by Cornelio Alvarez MD at 21-Oct-2022 10:56:30 PM EST                   LABS:  Labs Reviewed   CBC WITH AUTO DIFFERENTIAL - Abnormal; Notable for the following components:       Result Value    WBC 21.4 (*)     RBC 2.86 (*)     Hemoglobin 10.1 (*)     Hematocrit 31.3 (*)     .4 (*)     MCH 35.3 (*)     MCHC 32.3 (*)     RDW 16.0 (*)     MPV 8.9 (*)     Neutrophils % 84.4 (*)     Lymphocytes % 7.3 (*)     Neutrophils Absolute 18.0 (*)     All other components within normal limits   COMPREHENSIVE METABOLIC PANEL - Abnormal; Notable for the following components:    Sodium 135 (*)     Chloride 88 (*)     Anion Gap 24 (*)     Glucose 185 (*)     BUN 39 (*)     Creatinine 6.9 (*)     Est, Glom Filt Rate 6 (*)     Calcium 10.4 (*)     All other components within normal limits   URINALYSIS WITH REFLEX TO CULTURE - Abnormal; Notable for the following components:    Ketones, Urine TRACE (*)     Blood, Urine TRACE-LYSED (*)     Protein,  (*)     Leukocyte Esterase, Urine SMALL (*)     All other components within normal limits   VENOUS BLD GAS - Abnormal; Notable for the following components:    pH, Issa 7.53 (*)     pCO2, Issa 32.0 (*)     All other components within normal limits   LACTIC ACID - Abnormal; Notable for the following components:    Lactic Acid 3.7 (*)     All other components within normal limits    Narrative:     CALL  Nehemiah MICHAEL tel. ,  Chemistry results called to and read back by Aneesh Garcia RN in ED, 10/21/2022  23:42, by [facility]   PROCALCITONIN - Abnormal; Notable for the following components:    Procalcitonin 0.49 (*)     All other components within normal limits   LACTIC ACID - Abnormal; Notable for the following components:    Lactic Acid 2.4 (*)     All other components within normal limits    Narrative:     Natalia Bhardwaj tel. [phone],  Chemistry results called to and read back by Sumit Melissa, 10/22/2022  09:49, by SHEELA   CBC WITH AUTO DIFFERENTIAL - Abnormal; Notable for the following components:    WBC 18.4 (*)     RBC 2.56 (*)     Hemoglobin 9.0 (*)     Hematocrit 28.8 (*)     .5 (*)     MCH 35.2 (*)     MCHC 31.3 (*)     RDW 16.1 (*)     MPV 9.2 (*)     Neutrophils % 79.9 (*)     Lymphocytes % 12.9 (*)     Neutrophils Absolute 14.7 (*)     All other components within normal limits   BASIC METABOLIC PANEL W/ REFLEX TO MG FOR LOW K - Abnormal; Notable for the following components:    Sodium 131 (*)     Chloride 89 (*)     Glucose 123 (*)     BUN 45 (*)     Creatinine 7.8 (*)     Est, Glom Filt Rate 5 (*)     Calcium 10.3 (*)     All other components within normal limits   TSH WITH REFLEX TO FT4 - Abnormal; Notable for the following components:    TSH Reflex FT4 5.89 (*)     All other components within normal limits   POCT GLUCOSE - Abnormal; Notable for the following components:    POC Glucose 128 (*)     All other components within normal limits   POCT GLUCOSE - Abnormal; Notable for the following components:    POC Glucose 120 (*)     All other components within normal limits   COVID-19, RAPID   CULTURE, BLOOD 1   CULTURE, BLOOD 2   ACETONE   T4, FREE   POCT GLUCOSE   POCT GLUCOSE   POCT GLUCOSE       All other labs were within normal range or notreturned as of this dictation. RE-ASSESSMENT        EMERGENCY DEPARTMENT COURSE and DIFFERENTIAL DIAGNOSIS/MDM:   Vitals:    Vitals:    10/21/22 2330 10/22/22 0515 10/22/22 0732 10/22/22 1138   BP: (!) 111/49 (!) 100/52 85/65 (!) 119/56   Pulse: 86 91 94 88   Resp: 20 18 16 16   Temp:   99 °F (37.2 °C) 98.1 °F (36.7 °C)   TempSrc:   Oral Oral   SpO2: 95% 91% 100% 99%   Weight:       Height:             MDM  At this time passing off to Dr Laura Francis awaiting result of enema may require dissempaction and further work up. PROCEDURES:    Procedures      FINAL IMPRESSION      1. Colitis    2. Constipation, unspecified constipation type    3. Fecal impaction in rectum ([facility] [address]    4.  Leukocytosis, unspecified type          DISPOSITION/PLAN   DISPOSITION Admitted 10/21/2022 11:47:52 PM      PATIENT REFERRED TO:  Marina Mckinnon MD  [address] [id number] [phone]    Schedule an appointment as soon as possible for a visit       DISCHARGE MEDICATIONS:  Current Discharge Medication List        START taking these medications    Details   Sodium Phosphates (FLEET) 7-19 GM/118ML Place 1 enema rectally nightly for 3 days  Qty: 3 each, Refills: 0             (Please note that portions of this note were completed with a voice recognition program.  Efforts were made to edit the dictations but occasionallywords are mis-transcribed.)    Judd Daigle, [address] [place] [address]  10/22/22 5673

## 2022-10-22 PROBLEM — K52.9 COLITIS: Status: ACTIVE | Noted: 2022-10-22

## 2022-10-22 LAB
ANION GAP SERPL CALCULATED.3IONS-SCNC: 18 MMOL/L (ref 7–19)
BASOPHILS ABSOLUTE: 0.1 K/UL (ref 0–0.2)
BASOPHILS RELATIVE PERCENT: 0.4 % (ref 0–1)
BILIRUBIN URINE: NEGATIVE
BLOOD, URINE: ABNORMAL
BUN BLDV-MCNC: 45 MG/DL (ref 8–23)
CALCIUM SERPL-MCNC: 10.3 MG/DL (ref 8.8–10.2)
CHLORIDE BLD-SCNC: 89 MMOL/L (ref 98–111)
CLARITY: CLEAR
CO2: 24 MMOL/L (ref 22–29)
COLOR: YELLOW
CREAT SERPL-MCNC: 7.8 MG/DL (ref 0.5–0.9)
EOSINOPHILS ABSOLUTE: 0.2 K/UL (ref 0–0.6)
EOSINOPHILS RELATIVE PERCENT: 1 % (ref 0–5)
GFR SERPL CREATININE-BSD FRML MDRD: 5 ML/MIN/{1.73_M2}
GLUCOSE BLD-MCNC: 116 MG/DL (ref 70–99)
GLUCOSE BLD-MCNC: 120 MG/DL (ref 70–99)
GLUCOSE BLD-MCNC: 123 MG/DL (ref 74–109)
GLUCOSE BLD-MCNC: 128 MG/DL (ref 70–99)
GLUCOSE BLD-MCNC: 150 MG/DL (ref 70–99)
GLUCOSE URINE: NEGATIVE MG/DL
HCT VFR BLD CALC: 28.8 % (ref 37–47)
HEMOGLOBIN: 9 G/DL (ref 12–16)
IMMATURE GRANULOCYTES #: 0.4 K/UL
KETONES, URINE: ABNORMAL MG/DL
LACTIC ACID: 2.4 MMOL/L (ref 0.5–1.9)
LEUKOCYTE ESTERASE, URINE: ABNORMAL
LYMPHOCYTES ABSOLUTE: 2.4 K/UL (ref 1.1–4.5)
LYMPHOCYTES RELATIVE PERCENT: 12.9 % (ref 20–40)
MCH RBC QN AUTO: 35.2 PG (ref 27–31)
MCHC RBC AUTO-ENTMCNC: 31.3 G/DL (ref 33–37)
MCV RBC AUTO: 112.5 FL (ref 81–99)
MONOCYTES ABSOLUTE: 0.7 K/UL (ref 0–0.9)
MONOCYTES RELATIVE PERCENT: 3.6 % (ref 0–10)
NEUTROPHILS ABSOLUTE: 14.7 K/UL (ref 1.5–7.5)
NEUTROPHILS RELATIVE PERCENT: 79.9 % (ref 50–65)
NITRITE, URINE: NEGATIVE
PDW BLD-RTO: 16.1 % (ref 11.5–14.5)
PERFORMED ON: ABNORMAL
PH UA: 6 (ref 5–8)
PLATELET # BLD: 266 K/UL (ref 130–400)
PMV BLD AUTO: 9.2 FL (ref 9.4–12.3)
POTASSIUM REFLEX MAGNESIUM: 4 MMOL/L (ref 3.5–5)
PROCALCITONIN: 0.49 NG/ML (ref 0–0.09)
PROTEIN UA: 100 MG/DL
RBC # BLD: 2.56 M/UL (ref 4.2–5.4)
SODIUM BLD-SCNC: 131 MMOL/L (ref 136–145)
SPECIFIC GRAVITY UA: >=1.03 (ref 1–1.03)
T4 FREE: 1.11 NG/DL (ref 0.93–1.7)
TSH REFLEX FT4: 5.89 UIU/ML (ref 0.35–5.5)
UROBILINOGEN, URINE: 0.2 E.U./DL
WBC # BLD: 18.4 K/UL (ref 4.8–10.8)

## 2022-10-22 PROCEDURE — 6360000002 HC RX W HCPCS: Performed by: HOSPITALIST

## 2022-10-22 PROCEDURE — 2500000003 HC RX 250 WO HCPCS: Performed by: HOSPITALIST

## 2022-10-22 PROCEDURE — 84439 ASSAY OF FREE THYROXINE: CPT

## 2022-10-22 PROCEDURE — 84443 ASSAY THYROID STIM HORMONE: CPT

## 2022-10-22 PROCEDURE — 87040 BLOOD CULTURE FOR BACTERIA: CPT

## 2022-10-22 PROCEDURE — 80048 BASIC METABOLIC PNL TOTAL CA: CPT

## 2022-10-22 PROCEDURE — 6360000002 HC RX W HCPCS: Performed by: PEDIATRICS

## 2022-10-22 PROCEDURE — 36415 COLL VENOUS BLD VENIPUNCTURE: CPT

## 2022-10-22 PROCEDURE — 85025 COMPLETE CBC W/AUTO DIFF WBC: CPT

## 2022-10-22 PROCEDURE — 83605 ASSAY OF LACTIC ACID: CPT

## 2022-10-22 PROCEDURE — 82947 ASSAY GLUCOSE BLOOD QUANT: CPT

## 2022-10-22 PROCEDURE — 81003 URINALYSIS AUTO W/O SCOPE: CPT

## 2022-10-22 PROCEDURE — 99221 1ST HOSP IP/OBS SF/LOW 40: CPT | Performed by: INTERNAL MEDICINE

## 2022-10-22 PROCEDURE — 2580000003 HC RX 258: Performed by: INTERNAL MEDICINE

## 2022-10-22 PROCEDURE — 6370000000 HC RX 637 (ALT 250 FOR IP): Performed by: HOSPITALIST

## 2022-10-22 PROCEDURE — 2500000003 HC RX 250 WO HCPCS: Performed by: PEDIATRICS

## 2022-10-22 PROCEDURE — 8010000000 HC HEMODIALYSIS ACUTE INPT

## 2022-10-22 PROCEDURE — 5A1D70Z PERFORMANCE OF URINARY FILTRATION, INTERMITTENT, LESS THAN 6 HOURS PER DAY: ICD-10-PCS | Performed by: INTERNAL MEDICINE

## 2022-10-22 PROCEDURE — 82803 BLOOD GASES ANY COMBINATION: CPT

## 2022-10-22 PROCEDURE — 1210000000 HC MED SURG R&B

## 2022-10-22 PROCEDURE — 2580000003 HC RX 258: Performed by: HOSPITALIST

## 2022-10-22 RX ORDER — ACETAMINOPHEN 650 MG/1
650 SUPPOSITORY RECTAL EVERY 6 HOURS PRN
Status: DISCONTINUED | OUTPATIENT
Start: 2022-10-22 | End: 2022-10-26 | Stop reason: HOSPADM

## 2022-10-22 RX ORDER — INSULIN LISPRO 100 [IU]/ML
0-16 INJECTION, SOLUTION INTRAVENOUS; SUBCUTANEOUS
Status: DISCONTINUED | OUTPATIENT
Start: 2022-10-22 | End: 2022-10-26 | Stop reason: HOSPADM

## 2022-10-22 RX ORDER — ASPIRIN 81 MG/1
81 TABLET, CHEWABLE ORAL DAILY
Status: DISCONTINUED | OUTPATIENT
Start: 2022-10-22 | End: 2022-10-26 | Stop reason: HOSPADM

## 2022-10-22 RX ORDER — INSULIN LISPRO 100 [IU]/ML
4 INJECTION, SOLUTION INTRAVENOUS; SUBCUTANEOUS
Status: DISCONTINUED | OUTPATIENT
Start: 2022-10-22 | End: 2022-10-26 | Stop reason: HOSPADM

## 2022-10-22 RX ORDER — ACETAMINOPHEN 325 MG/1
650 TABLET ORAL EVERY 6 HOURS PRN
Status: DISCONTINUED | OUTPATIENT
Start: 2022-10-22 | End: 2022-10-26 | Stop reason: HOSPADM

## 2022-10-22 RX ORDER — SODIUM CHLORIDE 9 MG/ML
INJECTION, SOLUTION INTRAVENOUS CONTINUOUS
Status: DISCONTINUED | OUTPATIENT
Start: 2022-10-22 | End: 2022-10-23

## 2022-10-22 RX ORDER — CALCITRIOL 0.25 UG/1
1.5 CAPSULE, LIQUID FILLED ORAL DAILY
Status: DISCONTINUED | OUTPATIENT
Start: 2022-10-22 | End: 2022-10-26 | Stop reason: HOSPADM

## 2022-10-22 RX ORDER — HEPARIN SODIUM 5000 [USP'U]/ML
5000 INJECTION, SOLUTION INTRAVENOUS; SUBCUTANEOUS EVERY 8 HOURS SCHEDULED
Status: DISCONTINUED | OUTPATIENT
Start: 2022-10-22 | End: 2022-10-26 | Stop reason: HOSPADM

## 2022-10-22 RX ORDER — ROSUVASTATIN CALCIUM 20 MG/1
20 TABLET, COATED ORAL NIGHTLY
Status: DISCONTINUED | OUTPATIENT
Start: 2022-10-22 | End: 2022-10-26 | Stop reason: HOSPADM

## 2022-10-22 RX ORDER — SODIUM CHLORIDE 0.9 % (FLUSH) 0.9 %
5-40 SYRINGE (ML) INJECTION PRN
Status: DISCONTINUED | OUTPATIENT
Start: 2022-10-22 | End: 2022-10-26 | Stop reason: HOSPADM

## 2022-10-22 RX ORDER — ONDANSETRON 2 MG/ML
4 INJECTION INTRAMUSCULAR; INTRAVENOUS EVERY 6 HOURS PRN
Status: DISCONTINUED | OUTPATIENT
Start: 2022-10-22 | End: 2022-10-26 | Stop reason: HOSPADM

## 2022-10-22 RX ORDER — LAMOTRIGINE 100 MG/1
100 TABLET ORAL NIGHTLY
Status: DISCONTINUED | OUTPATIENT
Start: 2022-10-22 | End: 2022-10-26 | Stop reason: HOSPADM

## 2022-10-22 RX ORDER — BUSPIRONE HYDROCHLORIDE 10 MG/1
20 TABLET ORAL 2 TIMES DAILY
Status: DISCONTINUED | OUTPATIENT
Start: 2022-10-22 | End: 2022-10-26 | Stop reason: HOSPADM

## 2022-10-22 RX ORDER — INSULIN GLARGINE 100 [IU]/ML
15 INJECTION, SOLUTION SUBCUTANEOUS ONCE
Status: DISCONTINUED | OUTPATIENT
Start: 2022-10-22 | End: 2022-10-26 | Stop reason: HOSPADM

## 2022-10-22 RX ORDER — INSULIN LISPRO 100 [IU]/ML
0-4 INJECTION, SOLUTION INTRAVENOUS; SUBCUTANEOUS NIGHTLY
Status: DISCONTINUED | OUTPATIENT
Start: 2022-10-22 | End: 2022-10-26 | Stop reason: HOSPADM

## 2022-10-22 RX ORDER — POLYETHYLENE GLYCOL 3350 17 G/17G
17 POWDER, FOR SOLUTION ORAL DAILY PRN
Status: DISCONTINUED | OUTPATIENT
Start: 2022-10-22 | End: 2022-10-26 | Stop reason: HOSPADM

## 2022-10-22 RX ORDER — SODIUM CHLORIDE 9 MG/ML
INJECTION, SOLUTION INTRAVENOUS PRN
Status: DISCONTINUED | OUTPATIENT
Start: 2022-10-22 | End: 2022-10-26 | Stop reason: HOSPADM

## 2022-10-22 RX ORDER — BUPROPION HYDROCHLORIDE 150 MG/1
150 TABLET, EXTENDED RELEASE ORAL 2 TIMES DAILY
Status: DISCONTINUED | OUTPATIENT
Start: 2022-10-22 | End: 2022-10-26 | Stop reason: HOSPADM

## 2022-10-22 RX ORDER — MECOBALAMIN 5000 MCG
5 TABLET,DISINTEGRATING ORAL NIGHTLY PRN
Status: DISCONTINUED | OUTPATIENT
Start: 2022-10-22 | End: 2022-10-26 | Stop reason: HOSPADM

## 2022-10-22 RX ORDER — CITALOPRAM 20 MG/1
40 TABLET ORAL DAILY
Status: DISCONTINUED | OUTPATIENT
Start: 2022-10-22 | End: 2022-10-26 | Stop reason: HOSPADM

## 2022-10-22 RX ORDER — LEVOTHYROXINE SODIUM 88 UG/1
88 TABLET ORAL DAILY
Status: DISCONTINUED | OUTPATIENT
Start: 2022-10-22 | End: 2022-10-26 | Stop reason: HOSPADM

## 2022-10-22 RX ORDER — CETIRIZINE HYDROCHLORIDE 10 MG/1
10 TABLET ORAL PRN
Status: DISCONTINUED | OUTPATIENT
Start: 2022-10-22 | End: 2022-10-26 | Stop reason: HOSPADM

## 2022-10-22 RX ORDER — INSULIN GLARGINE 100 [IU]/ML
15 INJECTION, SOLUTION SUBCUTANEOUS NIGHTLY
Status: DISCONTINUED | OUTPATIENT
Start: 2022-10-22 | End: 2022-10-26 | Stop reason: HOSPADM

## 2022-10-22 RX ORDER — ASCORBIC ACID 500 MG
500 TABLET ORAL 2 TIMES DAILY
Status: DISCONTINUED | OUTPATIENT
Start: 2022-10-22 | End: 2022-10-26 | Stop reason: HOSPADM

## 2022-10-22 RX ORDER — ALLOPURINOL 100 MG/1
100 TABLET ORAL 2 TIMES DAILY
Status: DISCONTINUED | OUTPATIENT
Start: 2022-10-22 | End: 2022-10-26 | Stop reason: HOSPADM

## 2022-10-22 RX ORDER — ONDANSETRON 4 MG/1
4 TABLET, ORALLY DISINTEGRATING ORAL EVERY 8 HOURS PRN
Status: DISCONTINUED | OUTPATIENT
Start: 2022-10-22 | End: 2022-10-26 | Stop reason: HOSPADM

## 2022-10-22 RX ORDER — SEVELAMER CARBONATE 800 MG/1
800 TABLET, FILM COATED ORAL
Status: DISCONTINUED | OUTPATIENT
Start: 2022-10-22 | End: 2022-10-26 | Stop reason: HOSPADM

## 2022-10-22 RX ORDER — CALCIUM CARBONATE 200(500)MG
500 TABLET,CHEWABLE ORAL 3 TIMES DAILY PRN
Status: DISCONTINUED | OUTPATIENT
Start: 2022-10-22 | End: 2022-10-26 | Stop reason: HOSPADM

## 2022-10-22 RX ORDER — SODIUM CHLORIDE 0.9 % (FLUSH) 0.9 %
5-40 SYRINGE (ML) INJECTION EVERY 12 HOURS SCHEDULED
Status: DISCONTINUED | OUTPATIENT
Start: 2022-10-22 | End: 2022-10-26 | Stop reason: HOSPADM

## 2022-10-22 RX ADMIN — Medication 5 MG: at 22:52

## 2022-10-22 RX ADMIN — BUPROPION HYDROCHLORIDE 150 MG: 150 TABLET, EXTENDED RELEASE ORAL at 20:55

## 2022-10-22 RX ADMIN — SEVELAMER CARBONATE 800 MG: 800 TABLET, FILM COATED ORAL at 18:28

## 2022-10-22 RX ADMIN — ALLOPURINOL 100 MG: 100 TABLET ORAL at 11:01

## 2022-10-22 RX ADMIN — SODIUM CHLORIDE: 9 INJECTION, SOLUTION INTRAVENOUS at 11:15

## 2022-10-22 RX ADMIN — OXYCODONE HYDROCHLORIDE AND ACETAMINOPHEN 500 MG: 500 TABLET ORAL at 20:56

## 2022-10-22 RX ADMIN — CITALOPRAM HYDROBROMIDE 40 MG: 20 TABLET ORAL at 11:01

## 2022-10-22 RX ADMIN — BUSPIRONE HYDROCHLORIDE 20 MG: 10 TABLET ORAL at 11:00

## 2022-10-22 RX ADMIN — BUSPIRONE HYDROCHLORIDE 20 MG: 10 TABLET ORAL at 20:56

## 2022-10-22 RX ADMIN — ASPIRIN 81 MG 81 MG: 81 TABLET ORAL at 11:01

## 2022-10-22 RX ADMIN — CIPROFLOXACIN 400 MG: 2 INJECTION, SOLUTION INTRAVENOUS at 02:13

## 2022-10-22 RX ADMIN — ROSUVASTATIN CALCIUM 20 MG: 20 TABLET, COATED ORAL at 20:55

## 2022-10-22 RX ADMIN — SEVELAMER CARBONATE 800 MG: 800 TABLET, FILM COATED ORAL at 11:10

## 2022-10-22 RX ADMIN — CIPROFLOXACIN 400 MG: 2 INJECTION, SOLUTION INTRAVENOUS at 22:32

## 2022-10-22 RX ADMIN — OXYCODONE HYDROCHLORIDE AND ACETAMINOPHEN 500 MG: 500 TABLET ORAL at 11:01

## 2022-10-22 RX ADMIN — HEPARIN SODIUM 5000 UNITS: 5000 INJECTION INTRAVENOUS; SUBCUTANEOUS at 20:56

## 2022-10-22 RX ADMIN — ALLOPURINOL 100 MG: 100 TABLET ORAL at 20:56

## 2022-10-22 RX ADMIN — CALCITRIOL CAPSULES 0.25 MCG 1.5 MCG: 0.25 CAPSULE ORAL at 11:01

## 2022-10-22 RX ADMIN — METRONIDAZOLE 500 MG: 500 INJECTION, SOLUTION INTRAVENOUS at 18:32

## 2022-10-22 RX ADMIN — Medication 5 MG: at 03:19

## 2022-10-22 RX ADMIN — METRONIDAZOLE 500 MG: 500 INJECTION, SOLUTION INTRAVENOUS at 01:09

## 2022-10-22 RX ADMIN — Medication 10 ML: at 11:11

## 2022-10-22 RX ADMIN — BUPROPION HYDROCHLORIDE 150 MG: 150 TABLET, EXTENDED RELEASE ORAL at 11:01

## 2022-10-22 RX ADMIN — METRONIDAZOLE 500 MG: 500 INJECTION, SOLUTION INTRAVENOUS at 11:17

## 2022-10-22 RX ADMIN — LAMOTRIGINE 100 MG: 100 TABLET ORAL at 20:55

## 2022-10-22 RX ADMIN — LEVOTHYROXINE SODIUM 88 MCG: 0.09 TABLET ORAL at 11:02

## 2022-10-22 ASSESSMENT — LIFESTYLE VARIABLES
HOW MANY STANDARD DRINKS CONTAINING ALCOHOL DO YOU HAVE ON A TYPICAL DAY: PATIENT DOES NOT DRINK
HOW OFTEN DO YOU HAVE A DRINK CONTAINING ALCOHOL: NEVER

## 2022-10-22 ASSESSMENT — ENCOUNTER SYMPTOMS
ANAL BLEEDING: 0
DIARRHEA: 0
ABDOMINAL PAIN: 1
NAUSEA: 0
SHORTNESS OF BREATH: 1
ABDOMINAL DISTENTION: 1
CONSTIPATION: 1
RECTAL PAIN: 0
BLOOD IN STOOL: 0

## 2022-10-22 NOTE — PROGRESS NOTES
[facility] Progress Note    Patient:  Efrain Tolentino  YOB: 1960  Date of Service: 10/22/2022  MRN: [id number]   Acct: [de-identified]   Primary Care Physician: Mary Lorenzo MD  Advance Directive: Full Code  Admit Date: 10/21/2022       Hospital Day: 1      CHIEF COMPLAINT:     Chief Complaint   Patient presents with    Constipation     Constipated for 4 days. Taking stool softeners, miralax, and other laxatives, reports attempting manual disimpaction with no relief. 10/22/2022 2:00 PM  Subjective / Interval History:   10/22/2022  Patient seen and examined this AM.  Doing well. No new complaints. No acute changes or acute overnight event reported. Sitting comfortably in bed in no acute distress. Patient reported episode of bowel movement yesterday. Denies any acute complaints or distress at this time. Review of Systems:   Review of Systems  ROS: 14 point review of systems is negative except as specifically addressed above. ADULT DIET; Regular; 4 carb choices (60 gm/meal); Low Fat/Low Chol/High Fiber/2 gm Na; Low Sodium (2 gm); Low Potassium (Less than 3000 mg/day);  Low Phosphorus (Less than 1000 mg); 1000 ml    Intake/Output Summary (Last 24 hours) at 10/22/2022 1400  Last data filed at 10/22/2022 1000  Gross per 24 hour   Intake 240 ml   Output --   Net 240 ml       Medications:   sodium chloride      sodium chloride 125 mL/hr at 10/22/22 1115     Current Facility-Administered Medications   Medication Dose Route Frequency Provider Last Rate Last Admin    sodium chloride flush 0.9 % injection 5-40 mL  5-40 mL IntraVENous 2 times per day Lorenza López MD   10 mL at 10/22/22 1111    sodium chloride flush 0.9 % injection 5-40 mL  5-40 mL IntraVENous PRN Lorenza López MD        0.9 % sodium chloride infusion   IntraVENous PRN Lorenza López MD        ondansetron (ZOFRAN-ODT) disintegrating tablet 4 mg  4 mg Oral Q8H PRN Lorenza López MD        Or    ondansetron [facility] injection 4 mg  4 mg IntraVENous Q6H PRN Pari Perez MD        heparin (porcine) injection 5,000 Units  5,000 Units SubCUTAneous 3 times per day Pari Perez MD        acetaminophen (TYLENOL) tablet 650 mg  650 mg Oral Q6H PRN Pari Perez MD        Or    acetaminophen (TYLENOL) suppository 650 mg  650 mg Rectal Q6H PRN Pari Perez MD        melatonin disintegrating tablet 5 mg  5 mg Oral Nightly PRN Pari Perez MD   5 mg at 10/22/22 0319    polyethylene glycol (GLYCOLAX) packet 17 g  17 g Oral Daily PRN Pari Perez MD        calcium carbonate (TUMS) chewable tablet 500 mg  500 mg Oral TID PRN Pari Perez MD        aspirin chewable tablet 81 mg  81 mg Oral Daily Pari Perez MD   81 mg at 10/22/22 1101    busPIRone (BUSPAR) tablet 20 mg  20 mg Oral BID Pari Perez MD   20 mg at 10/22/22 1100    buPROPion [facility]) extended release tablet 150 mg  150 mg Oral BID Pari Perez MD   150 mg at 10/22/22 1101    calcitRIOL (ROCALTROL) capsule 1.5 mcg  1.5 mcg Oral Daily Pari Perez MD   1.5 mcg at 10/22/22 1101    allopurinol (ZYLOPRIM) tablet 100 mg  100 mg Oral BID Pari Perez MD   100 mg at 10/22/22 1101    cetirizine (ZYRTEC) tablet 10 mg  10 mg Oral PRN Pari Perez MD        citalopram (CELEXA) tablet 40 mg  40 mg Oral Daily Pari Perez MD   40 mg at 10/22/22 1101    lamoTRIgine (LAMICTAL) tablet 100 mg  100 mg Oral Nightly Pari Perez MD        levothyroxine (SYNTHROID) tablet 88 mcg  88 mcg Oral Daily Pari Perez MD   88 mcg at 10/22/22 1102    rosuvastatin (CRESTOR) tablet 20 mg  20 mg Oral Nightly Pari Perez MD        sevelamer (RENVELA) tablet 800 mg  800 mg Oral TID WC Pari Perez MD   800 mg at 10/22/22 1110    ascorbic acid (VITAMIN C) tablet 500 mg  500 mg Oral BID Pari Perez MD   500 mg at 10/22/22 1101    insulin glargine (LANTUS) injection vial 15 Units  15 Units SubCUTAneous Nightly Pari Perez MD        insulin glargine (LANTUS) injection vial 15 Units  15 Units SubCUTAneous Once Pedro Castrejon MD        insulin lispro (HUMALOG) injection vial 0-16 Units  0-16 Units SubCUTAneous TID  Pedro Castrejon MD        insulin lispro (HUMALOG) injection vial 0-4 Units  0-4 Units SubCUTAneous Nightly Pedro Castrejon MD        glucose chewable tablet 16 g  4 tablet Oral PRN Pedro Castrejon MD        glucagon (rDNA) injection 1 mg  1 mg IntraMUSCular PRN Pedro Castrejon MD        insulin lispro (HUMALOG) injection vial 4 Units  4 Units SubCUTAneous TID  Shelly Garcia MD        0.9 % sodium chloride infusion   IntraVENous Continuous Shelly Garcia  mL/hr at 10/22/22 1115 New Bag at 10/22/22 1115    [START ON 10/24/2022] epoetin jose ramon-epbx (RETACRIT) injection 3,000 Units  3,000 Units SubCUTAneous Once per day on Mon Wed Fri Chemo Salas MD        ondansetron (ZOFRAN-ODT) disintegrating tablet 4 mg  4 mg Oral Once Walker Steven MD        0.9 % sodium chloride bolus  250 mL IntraVENous Once Walker Steven MD        ciprofloxacin (CIPRO) IVPB 400 mg  400 mg IntraVENous Q24H Pedro Castrejon MD        metronidazole (FLAGYL) 500 mg in 0.9% NaCl 100 mL IVPB premix  500 mg IntraVENous Q8H Pedro Castrejon MD   Stopped at 10/22/22 1217    mineral oil enema 1 enema  1 enema Rectal Daily PRN Pedro Castrejon MD             sodium chloride      sodium chloride 125 mL/hr at 10/22/22 1115      sodium chloride flush  5-40 mL IntraVENous 2 times per day    heparin (porcine)  5,000 Units SubCUTAneous 3 times per day    aspirin  81 mg Oral Daily    busPIRone  20 mg Oral BID    buPROPion  150 mg Oral BID    calcitRIOL  1.5 mcg Oral Daily    allopurinol  100 mg Oral BID    citalopram  40 mg Oral Daily    lamoTRIgine  100 mg Oral Nightly    levothyroxine  88 mcg Oral Daily    rosuvastatin  20 mg Oral Nightly    sevelamer  800 mg Oral TID     vitamin C  500 mg Oral BID    insulin glargine  15 Units SubCUTAneous Nightly    insulin glargine  15 Units SubCUTAneous Once    insulin lispro  0-16 Units SubCUTAneous TID     insulin lispro  0-4 Units SubCUTAneous Nightly    insulin lispro  4 Units SubCUTAneous TID     [START ON 10/24/2022] epoetin jose ramon-epbx  3,000 Units SubCUTAneous Once per day on Mon Wed Fri    ondansetron  4 mg Oral Once    sodium chloride  250 mL IntraVENous Once    ciprofloxacin  400 mg IntraVENous Q24H    metroNIDAZOLE  500 mg IntraVENous Q8H     sodium chloride flush, sodium chloride, ondansetron **OR** ondansetron, acetaminophen **OR** acetaminophen, melatonin, polyethylene glycol, calcium carbonate, cetirizine, glucose, glucagon (rDNA), mineral oil  ADULT DIET; Regular; 4 carb choices (60 gm/meal); Low Fat/Low Chol/High Fiber/2 gm Na; Low Sodium (2 gm); Low Potassium (Less than 3000 mg/day); Low Phosphorus (Less than 1000 mg); 1000 ml       Labs:   CBC with DIFF:  Recent Labs     10/21/22  2134 10/22/22  0859   WBC 21.4* 18.4*   RBC 2.86* 2.56*   HGB 10.1* 9.0*   HCT 31.3* 28.8*   .4* 112.5*   MCH 35.3* 35.2*   MCHC 32.3* 31.3*   RDW 16.0* 16.1*    266   MPV 8.9* 9.2*   NEUTOPHILPCT 84.4* 79.9*   LYMPHOPCT 7.3* 12.9*   MONOPCT 3.3 3.6   EOSRELPCT 0.9 1.0   BASOPCT 0.4 0.4   NEUTROABS 18.0* 14.7*   LYMPHSABS 1.6 2.4   MONOSABS 0.70 0.70   EOSABS 0.20 0.20   BASOSABS 0.10 0.10       CMP/BMP:  Recent Labs     10/21/22  2134 10/22/22  0859   * 131*   K 4.2 4.0   CL 88* 89*   CO2 23 24   ANIONGAP 24* 18   GLUCOSE 185* 123*   BUN 39* 45*   CREATININE 6.9* 7.8*   LABGLOM 6* 5*   CALCIUM 10.4* 10.3*   PROT 8.3  --    LABALBU 4.4  --    BILITOT 0.4  --    ALKPHOS 93  --    ALT 10  --    AST 15  --          CRP:  No results for input(s): CRP in the last 72 hours. Sed Rate:  No results for input(s): SEDRATE in the last 72 hours.       HgBA1c:  No components found for: HGBA1C  FLP:    Lab Results   Component Value Date/Time    TRIG 282 01/09/2013 10:44 AM    HDL 35 01/09/2013 10:44 AM    LDLCALC ND 09/08/2011 02:00 PM    LDLDIRECT 81 01/09/2013 10:44 AM     TSH:    Lab Results   Component Value Date/Time    TSH 2.710 06/25/2022 02:23 PM     Troponin T: No results for input(s): TROPONINI in the last 72 hours. Pro-BNP: No results for input(s): BNP in the last 72 hours. INR: No results for input(s): INR in the last 72 hours. ABGs: No results found for: PHART, PO2ART, OYL2ZWI  UA:  Recent Labs     10/22/22  0023   COLORU YELLOW   PHUR 6.0   CLARITYU Clear   SPECGRAV >=1.030   LEUKOCYTESUR SMALL*   UROBILINOGEN 0.2   BILIRUBINUR Negative   BLOODU TRACE-LYSED*   GLUCOSEU NEGATIVE         Culture Results:    No results for input(s): CXSURG in the last 720 hours. Blood Culture Recent: No results for input(s): BC in the last 720 hours. No results for input(s): BC, BLOODCULT2, ORG in the last 72 hours. Cultures:   No results for input(s): CULTURE in the last 72 hours. No results for input(s): BC, Charlsie Dowdy in the last 72 hours. No results for input(s): CXSURG in the last 72 hours. No results for input(s): MG, PHOS in the last 72 hours. Recent Labs     10/21/22  2134   AST 15   ALT 10   BILITOT 0.4   ALKPHOS 93         RAD:   CT ABDOMEN PELVIS WO CONTRAST Additional Contrast? None    Result Date: 10/21/2022  Patient: Donny Davey  Time Out: 23:25Exam(s): CT ABDOMEN + PELVIS Without Contrast EXAM:  CT Abdomen and Pelvis Without Intravenous ContrastCLINICAL HISTORY:   Reason for exam: abdominal pain. TECHNIQUE:  Axial computed tomography images of the abdomen and pelvis without intravenous contrast.COMPARISON:Comparison made to prior CT scan of abdomen pelvis from September 11, 2022. FINDINGS:  Lung bases:  Unremarkable. No mass. No consolidation. ABDOMEN:  Liver:  Unremarkable. Gallbladder and bile ducts: Status post cholecystectomy. No ductal dilation. Pancreas:  Unremarkable. No ductal dilation. Spleen:  Unremarkable. No splenomegaly. Adrenals:  Unremarkable. No mass. Kidneys and ureters: Advanced atrophy. No obstructing stones. No hydronephrosis. Stomach and bowel: There is thickening and edema of the distal transverse, descending, and sigmoid colon wall registered into the rectal wall with mild inflammation of the surrounding fat. Submucosal fat halo sign in the right colon. PELVIS:  Appendix:  No findings to suggest acute appendicitis. Bladder:  Unremarkable. No stones. Reproductive: Status post hysterectomy. ABDOMEN and PELVIS:  Intraperitoneal space:  Unremarkable. No free air. No significant fluid collection. Bones/joints:  Remote superior endplate fracture of the L2 vertebral body. No acute fracture. No dislocation. Soft tissues:  Unremarkable. Vasculature: IVC filter in place. No abdominal aortic aneurysm. Lymph nodes:  Unremarkable. No enlarged lymph nodes. Findings concerning for colitis of the transverse, descending and sigmoid colon with proctitis, which may be of infectious or inflammatory etiologies. Electronically signed by Yolette Hammond MD on 10-21-22 at 2325    XR CHEST (2 VW)    Result Date: 9/24/2022  EXAMINATION: Radiography of the Chest CLINICAL INDICATION: Surgical clearance COMPARISON: Prior study from 09/01/2021 is available. FINDINGS: Lungs/Pleura: Lungs are clear. No effusion. No pneumothorax. There is a 4 mm granuloma seen in the right upper lung. Mediastinum and Guillermina: Unremarkable. Heart and Vessels: Normal sized heart. Bones: No acute osseous abnormality. There is osteoarthritic changes of thoracic spine with calcification of the anterior longitudinal ligament. Lines/Tubes/Hardware: None     1. No acute process in the chest.    XR ACUTE ABD SERIES CHEST 1 VW    Result Date: 10/21/2022  NO PRIOR REPORT AVAILABLE Exam: ACUTE ABDOMINAL SERIES Clinical data: Constipation, abdominal discomfort. Technique: Two views of the abdomen with frontal view of the chest. Prior studies: CT scan of abdomen and pelvis dated 9/11/22. Radiograph of chest dated 9/23/22.  Findings: Supine and upright views of the abdomen, as well as an AP chest. The lungs are grossly clear. Cardiac silhouette is within normal limits. Air in loops of nondistended small bowel and colon with a nonspecific, nonobstructive bowel gas pattern. No evidence of small bowel obstruction or intraperitoneal free air. Liver appears enlarged in size. No acute osseous abnormality is detected. Surgical sutures seen in right upper quadrant. Mild degenerative spine. Multiple phleboliths seen in pelvis. IVC filter at the L3-L4 level. 1. Nonspecific nonobstructive bowel gas pattern. Liver appears enlarged in size 2. No adverse interval change. Recommendation: Follow up as clinically indicated. Electronically Signed by Ronny Darling MD at 21-Oct-2022 10:56:30 PM EST                 Objective:   Vitals:   BP (!) 119/56   Pulse 88   Temp 98.1 °F (36.7 °C) (Oral)   Resp 16   Ht 5' 5\" (1.651 m)   Wt (!) 315 lb 4.1 oz (143 kg)   LMP 01/01/2000   SpO2 99%   BMI 52.46 kg/m²     Patient Vitals for the past 24 hrs:   BP Temp Temp src Pulse Resp SpO2   10/22/22 1138 (!) 119/56 98.1 °F (36.7 °C) Oral 88 16 99 %   10/22/22 0732 85/65 99 °F (37.2 °C) Oral 94 16 100 %   10/22/22 0515 (!) 100/52 -- -- 91 18 91 %   10/21/22 2330 (!) 111/49 -- -- 86 20 95 %       24HR INTAKE/OUTPUT:    Intake/Output Summary (Last 24 hours) at 10/22/2022 1400  Last data filed at 10/22/2022 1000  Gross per 24 hour   Intake 240 ml   Output --   Net 240 ml       Physical Exam  Vitals and nursing note reviewed. Constitutional:       General: She is not in acute distress. Appearance: Normal appearance. She is obese. She is not ill-appearing, toxic-appearing or diaphoretic. HENT:      Head: Normocephalic and atraumatic. Right Ear: External ear normal.      Left Ear: External ear normal.      Nose: Nose normal. No congestion or rhinorrhea. Mouth/Throat:      Mouth: Mucous membranes are moist.      Pharynx: Oropharynx is clear.  No oropharyngeal exudate or posterior oropharyngeal erythema. Eyes:      General: No scleral icterus. Right eye: No discharge. Left eye: No discharge. Extraocular Movements: Extraocular movements intact. Conjunctiva/sclera: Conjunctivae normal.      Pupils: Pupils are equal, round, and reactive to light. Cardiovascular:      Rate and Rhythm: Normal rate and regular rhythm. Pulses: Normal pulses. Heart sounds: Normal heart sounds. No murmur heard. No friction rub. No gallop. Pulmonary:      Effort: Pulmonary effort is normal. No respiratory distress. Breath sounds: Normal breath sounds. No stridor. No wheezing, rhonchi or rales. Chest:      Chest wall: No tenderness. Abdominal:      General: Bowel sounds are normal. There is no distension. Palpations: Abdomen is soft. Tenderness: There is abdominal tenderness (Diffuse tenderness to palpation. No guarding, rigidity or rebound tenderness noted. ). There is no guarding or rebound. Musculoskeletal:         General: No swelling, tenderness, deformity or signs of injury. Normal range of motion. Cervical back: Normal range of motion and neck supple. No rigidity. No muscular tenderness. Right lower leg: No edema. Left lower leg: No edema. Skin:     General: Skin is warm and dry. Capillary Refill: Capillary refill takes less than 2 seconds. Coloration: Skin is not jaundiced or pale. Findings: No bruising, erythema, lesion or rash. Neurological:      General: No focal deficit present. Mental Status: She is alert and oriented to person, place, and time. Cranial Nerves: No cranial nerve deficit. Sensory: No sensory deficit. Motor: No weakness. Coordination: Coordination normal.   Psychiatric:         Mood and Affect: Mood normal.         Behavior: Behavior normal.         Thought Content:  Thought content normal.         Judgment: Judgment normal.       Assessment/plan:     Patient Active Problem List    Diagnosis Date Noted    Colitis 10/22/2022     Priority: High    Sepsis ([address] 10/21/2022     Priority: Medium    Pyelonephritis 06/25/2022     Priority: Medium    ESRD on dialysis ([address] 07/24/2018     Priority: Medium    Hypoxia     Acute hemodialysis encounter ([address] 08/10/2021    Acute hypoxemic respiratory failure ([address] 08/10/2021    Diabetes ([facility] 08/10/2021    Hypertension 08/10/2021    Allergic rhinitis with postnasal drip 04/06/2021    Deep vein thrombosis of lower extremity ([address] 10/09/2019    Disease of liver 10/09/2019    Thyroid disease 02/26/2017    Sleep apnea 02/26/2017     Last Assessment & Plan:   Patient states that she currently uses a CPAP at night for obstructive sleep apnea. She states that she has noticed that she has had more drainage and sinus pressure causing throat and uvula swelling. She states that she feels like she is gagging or choking at times while she is trying to sleep. She is concerned and wishes to be evaluated for this. I will refer to ENT at this time. I will also treat for a possible sinus infection, unable to do steroids due to her being an insulin-dependent diabetic.       Mood disorder ([facility] [address] 02/26/2017    Type 2 DM with CKD stage 3 and hypertension ([facility] 02/26/2017    Hypertriglyceridemia 02/26/2017    Anxiety 02/26/2017    Vitamin D deficiency 02/26/2017    Anemia 02/26/2017    Hypocalcemia 02/26/2017    Anemia of chronic renal failure 12/12/2016    Hypertension associated with diabetes ([address] 10/17/2016    B12 deficiency 10/17/2016    Mixed diabetic hyperlipidemia associated with type 2 diabetes mellitus ([address] 10/17/2016    Morbid obesity ([address] 09/15/2011    Embolism ([address] 01/01/2004     Updating deleted diagnoses         Hospital Problems             Last Modified POA    * (Principal) Sepsis ([address] 10/21/2022 Yes    Colitis 10/22/2022 Yes    ESRD on dialysis ([address] 10/22/2022 Yes    Morbid obesity ([address] 10/22/2022 Yes    Sleep apnea 10/22/2022 Yes Overview Signed 5/3/2021  9:45 AM by Ebony Painter MA     Last Assessment & Plan:   Patient states that she currently uses a CPAP at night for obstructive sleep apnea. She states that she has noticed that she has had more drainage and sinus pressure causing throat and uvula swelling. She states that she feels like she is gagging or choking at times while she is trying to sleep. She is concerned and wishes to be evaluated for this. I will refer to ENT at this time. I will also treat for a possible sinus infection, unable to do steroids due to her being an insulin-dependent diabetic. Type 2 DM with CKD stage 3 and hypertension ([address] 10/22/2022 Yes    Hypertriglyceridemia 10/22/2022 Yes    Deep vein thrombosis of lower extremity ([address] 10/22/2022 Yes    Hypertension 10/22/2022 Yes       Principal Problem:    Sepsis ([address]  Active Problems:    Colitis    ESRD on dialysis ([address]    Morbid obesity ([address]    Sleep apnea    Type 2 DM with CKD stage 3 and hypertension ([address]    Hypertriglyceridemia    Deep vein thrombosis of lower extremity ([address]    Hypertension  Resolved Problems:    * No resolved hospital problems. *        Brief History/Summary:   Ms. Sergio Velazquez, a 27-year-old female, history of CHF, CKD, HLD, HTN, GERI, DMT2, ESRD (on HD) presented to [facility] ED (10/21/2022), on account of an acute onset of nausea/4-day history of constipation. Patient admitted to [facility] (10/21/2022), further work-up and management. Sepsis  Colitis  CT abdomen pelvis (10/21/2022): Findings concerning for colitis of the transverse, descending and sigmoid colon with proctitis, which may be of infectious or inflammatory etiologies  Antibiotic: Ciprofloxacin and Metronidazole  Lactic acid of 3.7 --> 2.4  (10/22/2022)  IV fluids  Trend lactic acid level  GI consult  Continue symptomatic and supportive management including pain management as needed.     ESRD, on scheduled HD   Nephrology consult  Continue scheduled HD as per nephrology rec     Diabetes Mellitus II, with hyperglycemia  Uncontrolled  Inpatient Regimens to include;  - Insulin Glargine (Lantus) 15 units subcu nightly  - Insulin Lispro (Humalog) 4 units subcu pre-meal 3 times a day  - Insulin Lispro (Humalog) on a High dose sliding scale  Monitor POC glucose, and adjust insulin regimen accordingly based on daily insulin requirement. Hypothyroidism  Levothyroxine 88 mcg p.o. daily  TSH level: 1.1 (10/22/2022)       Continue management of other chronic medical conditions - see above and orders. Advance Directive: Full Code    ADULT DIET; Regular; 4 carb choices (60 gm/meal); Low Fat/Low Chol/High Fiber/2 gm Na; Low Sodium (2 gm); Low Potassium (Less than 3000 mg/day); Low Phosphorus (Less than 1000 mg); 1000 ml         Consults Made:   IP CONSULT TO NEPHROLOGY  IP CONSULT TO GI      DVT prophylaxis: Heparin        Discharge planning: tbd    Time Spent is  35 mins  in the examination, evaluation, counseling and review of medications, assessment and plan.      Electronically signed by   Petar Hills MD, MPH, MD,   Internal Medicine Hospitalist   10/22/2022 2:00 PM

## 2022-10-22 NOTE — PROGRESS NOTES
Recent Labs     10/21/22  2134   BUN 39*       Recent Labs     10/21/22  2134   CREATININE 6.9*       Estimated Creatinine Clearance: 12 mL/min (A) (based on SCr of 6.9 mg/dL (H)).       Plan: Changed Cipro 400mg Q12hr to 400mg Daily due to decline in renal function    Electronically signed by [place] [facility] on 10/22/2022 at 4:06 AM

## 2022-10-22 NOTE — H&P
Psychiatric/Behavioral:  Positive for confusion.       Past Medical History:   Diagnosis Date    Anxiety     Arthritis     CHF (congestive heart failure) (HCC)     CKD (chronic kidney disease)     stage 4    Colon polyp     Depression     Embolism - blood clot 2004    Hemodialysis patient ([facility] [address]     5 days a week at home, takes Sunday + one other day off each week    Hx of blood clots     Hyperlipidemia     Hypertension     Obesity, morbid, BMI 50 or higher ([facility] [address]     Sleep apnea     CPAP    Thyroid disease     Type II or unspecified type diabetes mellitus without mention of complication, not stated as uncontrolled      Past Psychiatric History:  As per above    Past Surgical History:   Procedure Laterality Date    CHOLECYSTECTOMY, [address] N/A 08/11/2021    INSERTION CATHETER DIALYSIS performed by Ian Marshall MD at [facility] Left 06/18/2021    LEFT BRACHIAL-CEPHALIC AV FISTULA CREATION performed by Ian Marshall MD at [facility] Left 07/27/2021    LEFT UPPER EXTREMITY CEPHALIC VEIN SUPERFICIALIZATION performed by Ian Marshall MD at [address]  2005    FISTULAGRAM Left 09/30/2022    LEFT UPPER EXTREMITY AV FISTULAGRAM, POSSIBLE BALLOON ANGIOPLASTY, STENT POSS REVISION performed by Ian Marshall MD at [address] ([address] [facility])  10/17/2018    POLYPECTOMY      ~2012 had colon polyps, 2022 had a benign polyp and 2 possibly cancerous polyps, DUE FOR REPEAT IN DEC 2022    RECTAL SURGERY  1981    fistula repair    TONSILLECTOMY AND ADENOIDECTOMY      at age 12 years    VASCULAR SURGERY  07/12/2021    [facility]- Ultrasound-guided cannulation left distal upper arm cephalic vein with 4 [place] glide sheath, Left upper extremity fistulogram's including venography the superior vena cava    VASCULAR SURGERY  07/27/2021    [facility]- Superficialization of the left upper arm cephalic vein arteriovenous fistula     Social History       Tobacco History       Smoking Status  Never      Passive Exposure  Past (all growing up Dad smoked cigars and Mom smoked ciggarettes near her)      Smokeless Tobacco Use  Never              Alcohol History       Alcohol Use Status  Never              Drug Use       Drug Use Status  No              Sexual Activity       Sexually Active  Not Asked Comment  has a daughter             CODE STATUS: DNR  HEALTH CARE PROXY: Mr. Paulina Irving, Her , [phone]  AMBULATES: uses a wheel chair when out, walks at home independently  DOMICILED: has stairs in her home to the den and washer which she does not use     Family History   Problem Relation Age of Onset    Lung Cancer Mother     Brain Cancer Mother     Heart Attack Father     Prostate Cancer Father     Heart Disease Father     Stroke Father     Obesity Father     No Known Problems Brother     Uterine Cancer Maternal Grandmother     Cervical Cancer Maternal Grandmother     Diabetes Maternal Grandfather     Other Maternal Grandfather         histoplasmosis    Obesity Paternal Grandmother     Diabetes Paternal Grandfather     Kidney Disease Paternal Grandfather     Other Daughter         Rocio Moreira Tabes syndrome     Allergies   Allergen Reactions    Codeine Nausea And Vomiting and Other (See Comments)     \"Hot and sweaty\"     Home Medications:  Prior to Admission medications    Medication Sig Start Date End Date Taking?  Authorizing Provider   Sodium Phosphates (FLEET) 7-19 GM/118ML Place 1 enema rectally nightly for 3 days 10/21/22 10/24/22 Yes Dung Cowart MD   docusate sodium (COLACE) 100 MG capsule Take 1 capsule by mouth 2 times daily 10/21/22  Yes Dung Cowart MD   Semaglutide,0.25 or 0.5MG/DOS, (OZEMPIC, 0.25 OR 0.5 MG/DOSE,) 2 MG/1.5ML SOPN Inject 2 mg into the skin once a week    Historical Provider, MD   Sucroferric Oxyhydroxide (VELPHORO) 500 MG CHEW Take 2 tablets by mouth 3 times daily (before meals)    Historical Provider, MD   buPROPion (WELLBUTRIN SR) 150 MG extended release tablet Take 150 mg by mouth 2 times daily    Historical Provider, MD   ondansetron (ZOFRAN ODT) 4 MG disintegrating tablet Take 1 tablet by mouth every 8 hours as needed for Nausea or Vomiting 9/11/22   Mary Obrien MD   Insulin Regular Human (HUMULIN R U-500 KWIKPEN SC) Inject into the skin OMNI POD 1 UNIT PER HOUR AND BOLUS 25 UNITS Q AM, 15 UNITS AT HS    Historical Provider, MD   potassium chloride (KLOR-CON) 20 MEQ packet Take 20 mEq by mouth daily    Historical Provider, MD   vitamin C (ASCORBIC ACID) 500 MG tablet Take 500 mg by mouth 2 times daily    Historical Provider, MD   carvedilol (COREG) 6.25 MG tablet Take 6.25 mg by mouth 2 times daily (with meals)    Historical Provider, MD   calcitRIOL (ROCALTROL) 0.5 MCG capsule Take 1.5 mcg by mouth daily    Historical Provider, MD   Evolocumab (REPATHA SC) Inject into the skin every 14 days    Historical Provider, MD   levothyroxine (SYNTHROID) 88 MCG tablet Take 88 mcg by mouth Daily  5/27/21   Historical Provider, MD   rosuvastatin (CRESTOR) 20 MG tablet Take 20 mg by mouth daily  5/31/21   Historical Provider, MD   cetirizine (ZYRTEC) 10 MG tablet Take 10 mg by mouth as needed for Allergies     Historical Provider, MD   diazePAM (VALIUM) 2 MG tablet Take 2 mg by mouth as needed for Anxiety.      Historical Provider, MD   fluticasone [facility]) 50 MCG/ACT nasal spray 2 sprays by Nasal route daily as needed for Rhinitis  3/22/21   Historical Provider, MD   epoetin jose ramon (EPOGEN;PROCRIT) 52426 UNIT/ML injection Inject 10,000 Units into the skin every 14 days    Historical Provider, MD   lamoTRIgine (LAMICTAL) 100 MG tablet Take 100 mg by mouth nightly    Historical Provider, MD   allopurinol (ZYLOPRIM) 100 MG tablet Take 100 mg by mouth 2 times daily    Historical Provider, MD   citalopram (CELEXA) 40 MG tablet Take 40 mg by mouth daily    Historical Provider, MD   busPIRone (BUSPAR) 10 MG tablet Take 20 mg by mouth 2 times daily    Historical Provider, MD   aspirin 81 MG tablet Take 81 mg by mouth daily With [address] Provider, MD         OBJECTIVE:    Magdalena Lose:    10/22/22 0732   BP: 85/65   Pulse: 94   Resp: 16   Temp: 99 °F (37.2 °C)   SpO2: 100%   Breathing room air    BP 85/65   Pulse 94   Temp 99 °F (37.2 °C) (Oral)   Resp 16   Ht 5' 5\" (1.651 m)   Wt (!) 315 lb 4.1 oz (143 kg)   LMP 01/01/2000   SpO2 100%   BMI 52.46 kg/m²     No intake or output data in the 24 hours ending 10/22/22 8161    Physical Exam  Vitals reviewed. Constitutional:       General: She is not in acute distress. Appearance: Normal appearance. She is obese. She is not ill-appearing or toxic-appearing. HENT:      Head: Normocephalic and atraumatic. Nose: No congestion or rhinorrhea. Eyes:      General:         Right eye: No discharge. Left eye: No discharge. Neck:      Comments: Supple, trachea appears midline  Cardiovascular:      Rate and Rhythm: Normal rate and regular rhythm. Heart sounds: No murmur heard. No friction rub. No gallop. Abdominal:      Tenderness: There is no abdominal tenderness. There is no guarding or rebound. Comments: Has abdominal focus of her obesity   Skin:     General: Skin is warm. Comments: nondiaphoretic   Neurological:      Mental Status: She is alert and oriented to person, place, and time.    Psychiatric:         Mood and Affect: Mood normal.         Behavior: Behavior normal.        LABORATORY DATA:    CBC:   Recent Labs     10/21/22  2134   WBC 21.4*   HGB 10.1*   HCT 31.3*        BMP:   Recent Labs     10/21/22  2134   *   K 4.2   CL 88*   CO2 23   BUN 39*   CREATININE 6.9*   CALCIUM 10.4*     Hepatic Profile:   Recent Labs     10/21/22  2134   AST 15   ALT 10   BILITOT 0.4   ALKPHOS 93     Urinalysis:   Lab Results   Component Value Date/Time    NITRU Negative 10/22/2022 12:23 AM    WBCUA TNTC 09/11/2022 03:58 PM    BACTERIA 1+ 09/11/2022 03:58 PM    RBCUA TNTC 09/11/2022 03:58 PM    RBCUA 1 09/16/2015 09:10 AM    BLOODU TRACE-LYSED 10/22/2022 12:23 AM    SPECGRAV >=1.030 10/22/2022 12:23 AM    GLUCOSEU NEGATIVE 10/22/2022 12:23 AM       EKG:   Not indicated    IMAGING:  CT ABDOMEN PELVIS WO CONTRAST Additional Contrast? None  Result Date: 10/21/2022  Findings concerning for colitis of the transverse, descending and sigmoid colon with proctitis, which may be of infectious or inflammatory etiologies. Electronically signed by Isma Griffin MD on 10-21-22 at 2325  XR ACUTE ABD SERIES CHEST 1 VW  Result Date: 10/21/2022  1. Nonspecific nonobstructive bowel gas pattern. Liver appears enlarged in size 2. No adverse interval change. Recommendation: Follow up as clinically indicated.  Electronically Signed by Maribel Arceo MD at 21-Oct-2022 10:56:30 PM EST                 ASESSMENTS & PLANS:    Sepsis of unclear source:  Cipro and Flagyl have been continued as they were begun in the ED, they will be continued as when seen at the end of shift she is feeling   CBC with Diff daily  Follow up ED Cxx  Repeat LA & CBC with Diff * BMP that i had ordered were never done as she got up from the ED later than planned, will get now as stat draws  No sepsis fluid challenge as it is contraindicated by her ESRD with HD dependence    ERSD on HD  Nephro consulation  Needs HD but not needed emergently overnight  Daily weights  Strict Is and Os  No NSAIDs other than baby ASA once QDay    Morbid Obesity with GYQ>50 complicates all aspects of care    Chronic Medical Problems  Continue home regimen as indicated   aspirin  81 mg Oral Daily    busPIRone  20 mg Oral BID    buPROPion  150 mg Oral BID    calcitRIOL  1.5 mcg Oral Daily    allopurinol  100 mg Oral BID    citalopram  40 mg Oral Daily    lamoTRIgine  100 mg Oral Nightly    levothyroxine  88 mcg Oral Daily    rosuvastatin  20 mg Oral Nightly    sevelamer 800 mg Oral TID WC    vitamin C  500 mg Oral BID    insulin glargine  15 Units SubCUTAneous Nightly    insulin glargine  15 Units SubCUTAneous Once    insulin lispro  0-16 Units SubCUTAneous TID WC    insulin lispro  0-4 Units SubCUTAneous Nightly     Supoportive and Prophylactic Txx:  DVT Prophylaxis: Heparin SQ  GI (PUD) Ppx: not indicated as such  PT consult for evalutation and Txx as indicated: defer  ADULT DIET; Regular; 4 carb choices (60 gm/meal); Low Fat/Low Chol/High Fiber/2 gm Na; Low Sodium (2 gm); Low Potassium (Less than 3000 mg/day); Low Phosphorus (Less than 1000 mg); 1000 ml  sodium chloride flush, sodium chloride, ondansetron **OR** ondansetron, acetaminophen **OR** acetaminophen, melatonin, polyethylene glycol, calcium carbonate, mineral oil      Care time of >45 minutes  Pt seen/examined and admitted to inpatient status. Inpatient status is used for patients with an expected LOS extending past two midnights due to medical therapy and or critical care needs, otherwise patients are placed to OBServation status. Signed:  Electronically signed by Julia Smith MD on 10/22/22 at 8:45 AM CDT.

## 2022-10-22 NOTE — CONSULTS
[employer] OF [facility] OLINDA [address], [address] [facility]                                  CONSULTATION    PATIENT NAME: Ming Izquierdo                      :        1960  MED REC NO:   [id number]                              ROOM:       [facility]  ACCOUNT NO:   [de-identified]                           ADMIT DATE: 10/21/2022  PROVIDER:     Brad Chou MD    CONSULT DATE:  10/22/2022    GI CONSULTATION    ASSESSMENT:  1. Chronic constipation after starting renal dialysis with constipation  attributed to her medications and oral fluid restriction daily. 2.  Abnormal CT of the abdomen reporting colon wall thickening  interpreted as colitis, although the patient has no history of diarrheal  illness. 3.  History of gastric polyps on EGD on 2022, at [facility]  by Dr. Shawn Pak. RECOMMENDATIONS:  1. The patient is feeding and therefore can stop IV fluid hydration. 2.  The patient needs to have dialysis treatment today. 3.  Complete oral antibiotics. 4.  Chronic maintenance MiraLax and stool softener daily as well as  additional Dulcolax as needed for constipation. 5.  The patient already has an office appointment with Dr. Shawn Pak this  coming Wednesday with plan for followup EGD and she may wish to have a  colonoscopy at the same setting. HISTORY OF PRESENT ILLNESS:  This 80-year-old white female has a  background history of multiple medical illnesses as listed in the past  medical history. She had a screening colonoscopy in 2017, which was  normal.  There is a family history of colon cancer. There is no family  history of inflammatory bowel disease such as ulcerative colitis. She  states that she started to have a problem with increasing constipation  ever since she started dialysis for end-stage renal disease. She has  had to depend on a daily laxative. There is no history of diarrhea or  passage of blood rectally.   Appetite is good and she remains morbidly  obese. There are currently no upper GI complaints. She presented in  the emergency room with inability to pass stool for a few days causing  to feel distended. She was administered Fleet's enema, manual removal  of rectal impaction and then received a soap suds enema. She was then  admitted because the CAT scan of the abdomen reported possible colitis. Admission lab initially showed WBC 21.4, hemoglobin 10.1, platelets 227,  and normal chemistry, except for expected creatinine of 7.8 and BUN 45. Of concern, in addition to the leukocytosis, there was elevated lactic  acid of 3.7, therefore she was started on antibiotics with improving  lactic acid down to 2.4. Urinalysis showed small leukocyte esterase,  but negative nitrite. There were previous urinary tract infections in  the past.  CAT scan of the abdomen reported a colonic wall thickening of  the transverse, descending, and sigmoid colon down to the rectum. Since  admission, the patient is feeling well and observed to be feeding  without any symptoms. PAST MEDICAL HISTORY:  End-stage renal disease, type 2 diabetes  mellitus, hypertension, hyperlipidemia, hypothyroidism. SURGICAL HISTORY:  Cholecystectomy, hysterectomy, prior colonoscopy for  removal of colon polyps remotely, gastric polyps needing followup by Dr. Alejandro Ceron. CURRENT MAINTENANCE MEDICATIONS:  See admission medication profile in  the hospital.    SOCIAL HISTORY:  No habits of alcohol or tobacco.    FAMILY HISTORY:  Colon cancer. PHYSICAL EXAMINATION:  GENERAL:  Morbidly obese female, no distress, currently afebrile. VITAL SIGNS:  Normal.µ  HEENT:  Sclerae white. Conjunctivae pink. Buccal mucosa moist.  NECK:  Supple. LUNGS:  Clear. CARDIOVASCULAR:  Normal heart sounds. No gallop. ABDOMEN:  Obese abdomen. No tenderness. Normal bowel sounds. Unable  to palpate internal organs for size due to fat pad.         Nancy Aldridge MD    D: 10/22/2022 14:34:46      T: 10/22/2022 14:40:01     [email or web address]  Job#: [id number]     Doc#: [id number]    CC:

## 2022-10-22 NOTE — ED PROVIDER NOTES
[facility] 3 JASON/VAS/MED  eMERGENCY dEPARTMENT eNCOUnter      Pt Name: Julio Carmichael  MRN: [id number]  [place] 1960  Date of evaluation: 10/21/2022  Provider: Helen Kenyn MD    CHIEF COMPLAINT       Chief Complaint   Patient presents with    Constipation     Constipated for 4 days. Taking stool softeners, miralax, and other laxatives, reports attempting manual disimpaction with no relief. HISTORY OF PRESENT ILLNESS   (Location/Symptom, Timing/Onset,Context/Setting, Quality, Duration, Modifying Factors, Severity)  Note limiting factors. Julio Carmichael is a 58 y.o. female who presents to the emergency department with complaint of constipation for 4 days. Patient signed out to me by ЕЛЕНА Rush due to end of shift at 19:15pm.  Patient has received fleets enema with small return. Patient believes that she needs to be disimpacted manually. Patient has rectal discomfort and pain with increased pressure. Patient denies abdominal pain, vomiting, fever, diarrhea, burning with urination, lightheadedness or syncope. Patient states that \"when I am constipated I can feel it pushing on my vagina. \"  Patient admits to taking multiple products including stool softeners and MiraLAX without relief. Patient has attempted manual disimpaction on her own without success. Nursing requesting Zofran as patient has developed nausea. Orders placed for basic labs and IV at this time. HPI    NursingNotes were reviewed.         PAST MEDICALHISTORY     Past Medical History:   Diagnosis Date    Anxiety     Arthritis     CHF (congestive heart failure) (HCC)     CKD (chronic kidney disease)     stage 4    Colon polyp     Depression     Embolism - blood clot 2004    Hemodialysis patient ([facility] [address]     5 days a week at home, takes Sunday + one other day off each week    Hx of blood clots     Hyperlipidemia     Hypertension     Obesity, morbid, BMI 50 or higher ([facility] [address]     Sleep apnea     CPAP    Thyroid disease     Type II or unspecified type diabetes mellitus without mention of complication, not stated as uncontrolled          SURGICAL HISTORY       Past Surgical History:   Procedure Laterality Date    CHOLECYSTECTOMY, LAPAROSCOPIC  1986    DIALYSIS CATHETER INSERTION N/A 08/11/2021    INSERTION CATHETER DIALYSIS performed by José Antonio Manriquez MD at [place] [address] Left 06/18/2021    LEFT BRACHIAL-CEPHALIC AV FISTULA CREATION performed by José Antonio Manriquez MD at [place] [address] Left 07/27/2021    LEFT UPPER EXTREMITY CEPHALIC VEIN SUPERFICIALIZATION performed by José Antonio Manriquez MD at [address] [place] [address]  2005    FISTULAGRAM Left 09/30/2022    LEFT UPPER EXTREMITY AV FISTULAGRAM, POSSIBLE BALLOON ANGIOPLASTY, STENT POSS REVISION performed by José Antonio Manriquez MD at [facility] ([facility]  10/17/2018    POLYPECTOMY      ~2012 had colon polyps, 2022 had a benign polyp and 2 possibly cancerous polyps, DUE FOR REPEAT IN DEC 2022    RECTAL SURGERY  1981    fistula repair    TONSILLECTOMY AND ADENOIDECTOMY      at age 12 years    VASCULAR SURGERY  07/12/2021    [facility]- Ultrasound-guided cannulation left distal upper arm cephalic vein with 4 Frisian glide sheath, Left upper extremity fistulogram's including venography the superior vena cava    VASCULAR SURGERY  07/27/2021    [facility]- Superficialization of the left upper arm cephalic vein arteriovenous fistula         CURRENT MEDICATIONS     Current Discharge Medication List        CONTINUE these medications which have NOT CHANGED    Details   Semaglutide,0.25 or 0.5MG/DOS, (OZEMPIC, 0.25 OR 0.5 MG/DOSE,) 2 MG/1.5ML SOPN Inject 2 mg into the skin once a week      Sucroferric Oxyhydroxide (VELPHORO) 500 MG CHEW Take 2 tablets by mouth 3 times daily (before meals)      buPROPion (WELLBUTRIN SR) 150 MG extended release tablet Take 150 mg by mouth 2 times daily      ondansetron (ZOFRAN ODT) 4 MG disintegrating tablet Take 1 tablet by mouth every 8 hours as needed for Nausea or Vomiting  Qty: 12 tablet, Refills: 0      Insulin Regular Human (HUMULIN R U-500 KWIKPEN SC) Inject into the skin OMNI POD 1 UNIT PER HOUR AND BOLUS 25 UNITS Q AM, 15 UNITS AT HS      potassium chloride (KLOR-CON) 20 MEQ packet Take 20 mEq by mouth daily      vitamin C (ASCORBIC ACID) 500 MG tablet Take 500 mg by mouth 2 times daily      carvedilol (COREG) 6.25 MG tablet Take 6.25 mg by mouth 2 times daily (with meals)      calcitRIOL (ROCALTROL) 0.5 MCG capsule Take 1.5 mcg by mouth daily      Evolocumab (REPATHA SC) Inject into the skin every 14 days      levothyroxine (SYNTHROID) 88 MCG tablet Take 88 mcg by mouth Daily       rosuvastatin (CRESTOR) 20 MG tablet Take 20 mg by mouth daily       cetirizine (ZYRTEC) 10 MG tablet Take 10 mg by mouth as needed for Allergies       diazePAM (VALIUM) 2 MG tablet Take 2 mg by mouth as needed for Anxiety.        fluticasone (FLONASE) 50 MCG/ACT nasal spray 2 sprays by Nasal route daily as needed for Rhinitis       epoetin jose ramon (EPOGEN;PROCRIT) 42283 UNIT/ML injection Inject 10,000 Units into the skin every 14 days      lamoTRIgine (LAMICTAL) 100 MG tablet Take 100 mg by mouth nightly      allopurinol (ZYLOPRIM) 100 MG tablet Take 100 mg by mouth 2 times daily      citalopram (CELEXA) 40 MG tablet Take 40 mg by mouth daily      busPIRone (BUSPAR) 10 MG tablet Take 20 mg by mouth 2 times daily      aspirin 81 MG tablet Take 81 mg by mouth daily With Dinner             ALLERGIES     Codeine    FAMILY HISTORY       Family History   Problem Relation Age of Onset    Lung Cancer Mother     Brain Cancer Mother     Heart Attack Father     Prostate Cancer Father     Heart Disease Father     Stroke Father     Obesity Father     No Known Problems Brother     Uterine Cancer Maternal Grandmother     Cervical Cancer Maternal Grandmother     Diabetes Maternal Grandfather     Other Maternal Grandfather         histoplasmosis    Obesity Paternal Grandmother     Diabetes Paternal Grandfather     Kidney Disease Paternal Grandfather     Other Daughter         Virginia Hoover Tabes syndrome          SOCIAL HISTORY       Social History     Socioeconomic History    Marital status:      Spouse name: Mr. 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 Kenyatta Velazquez, Her , [phone]    AMBULATES: uses a wheel chair when out, walks at home independently    DOMICILED: has stairs in her home to the den and washer which she does not use       SCREENINGS    Sherrie Coma Scale  Eye Opening: Spontaneous  Best Verbal Response: Oriented  Best Motor Response: Obeys commands  Sherrie Coma Scale Score: 15        PHYSICAL EXAM    (up to 7 for level 4, 8 or more for level 5)     ED Triage Vitals [10/21/22 1341]   BP Temp Temp Source Heart Rate Resp SpO2 Height Weight   (!) 98/53 99.1 °F (37.3 °C) Oral 90 20 100 % 5' 5\" (1.651 m) (!) 315 lb 4.1 oz (143 kg)       Physical Exam  Vitals and nursing note reviewed. Constitutional:       General: She is not in acute distress. Appearance: Normal appearance. She is obese. HENT:      Head: Normocephalic and atraumatic. Right Ear: External ear normal.      Left Ear: External ear normal.      Nose: Nose normal.      Mouth/Throat:      Mouth: Mucous membranes are moist.      Pharynx: Oropharynx is clear. No oropharyngeal exudate. Eyes:      General: No scleral icterus. Conjunctiva/sclera: Conjunctivae normal.      Pupils: Pupils are equal, round, and reactive to light. Cardiovascular:      Rate and Rhythm: Normal rate and regular rhythm. Pulses: Normal pulses. Heart sounds: Normal heart sounds. Pulmonary:      Effort: Pulmonary effort is normal. No respiratory distress. Breath sounds: Normal breath sounds. No wheezing, rhonchi or rales. Abdominal:      General: Bowel sounds are normal. There is distension. Palpations: Abdomen is soft. Tenderness: There is abdominal tenderness (Mild diffuse tenderness without guarding or rebound mild diffuse tenderness without guarding or rebound). There is no right CVA tenderness, left CVA tenderness, guarding or rebound. Genitourinary:     Comments: Rectum is distended and large amount of stool in rectal vault. Stool is soft and brown. Manual disimpaction performed with large amount of stool. Enema repeated with large amount of stool resultant. Musculoskeletal:         General: No tenderness or deformity. Cervical back: Neck supple. No rigidity. Skin:     General: Skin is warm and dry. Capillary Refill: Capillary refill takes less than 2 seconds. Coloration: Skin is not jaundiced. Neurological:      General: No focal deficit present. Mental Status: She is alert and oriented to person, place, and time. Mental status is at baseline. Cranial Nerves: No cranial nerve deficit. Sensory: No sensory deficit. Motor: No weakness.       Coordination: Coordination normal.   Psychiatric:         Mood and Affect: Mood normal.         Behavior: Behavior normal.       DIAGNOSTIC RESULTS     RADIOLOGY:  Non-plain film images such as CT, Ultrasound and MRI are read by the radiologist. Plain radiographic images are visualized and preliminarily interpreted bythe emergency physician with the below findings:      CT ABDOMEN PELVIS WO CONTRAST Additional Contrast? None   Final Result   Findings concerning for colitis of the transverse, descending and sigmoid colon with proctitis, which may be of infectious or inflammatory etiologies. Electronically signed by Glo Escobar MD on 10-21-22 at 2325      XR ACUTE ABD SERIES CHEST 1 VW   Final Result   1. Nonspecific nonobstructive bowel gas pattern. Liver appears enlarged in size   2. No adverse interval change. Recommendation: Follow up as clinically indicated.    Electronically Signed by Shea Cummings MD at 21-Oct-2022 10:56:30 PM EST                       LABS:  Labs Reviewed   CBC WITH AUTO DIFFERENTIAL - Abnormal; Notable for the following components:       Result Value    WBC 21.4 (*)     RBC 2.86 (*)     Hemoglobin 10.1 (*)     Hematocrit 31.3 (*)     .4 (*)     MCH 35.3 (*)     MCHC 32.3 (*)     RDW 16.0 (*)     MPV 8.9 (*)     Neutrophils % 84.4 (*)     Lymphocytes % 7.3 (*)     Neutrophils Absolute 18.0 (*)     All other components within normal limits   COMPREHENSIVE METABOLIC PANEL - Abnormal; Notable for the following components:    Sodium 135 (*)     Chloride 88 (*)     Anion Gap 24 (*)     Glucose 185 (*)     BUN 39 (*)     Creatinine 6.9 (*)     Est, Glom Filt Rate 6 (*)     Calcium 10.4 (*)     All other components within normal limits   URINALYSIS WITH REFLEX TO CULTURE - Abnormal; Notable for the following components:    Ketones, Urine TRACE (*)     Blood, Urine TRACE-LYSED (*)     Protein,  (*)     Leukocyte Esterase, Urine SMALL (*)     All other components within normal limits   VENOUS BLD GAS - Abnormal; Notable for the following components:    pH, Issa 7.53 (*)     pCO2, Issa 32.0 (*)     All other components within normal limits   LACTIC ACID - Abnormal; Notable for the following components:    Lactic Acid 3.7 (*)     All other components within normal limits    Narrative:     CALL  Cerna  KLED tel. ,  Chemistry results called to and read back by Jenelle Redding RN in ED, 10/21/2022  23:42, by [facility]   PROCALCITONIN - Abnormal; Notable for the following components:    Procalcitonin 0.49 (*)     All other components within normal limits   LACTIC ACID - Abnormal; Notable for the following components:    Lactic Acid 2.4 (*)     All other components within normal limits    Narrative:     Andrew Smith tel. [phone],  Chemistry results called to and read back by Bita Min, 10/22/2022  09:49, by SHEELA   CBC WITH AUTO DIFFERENTIAL - Abnormal; Notable for the following components:    WBC 18.4 (*)     RBC 2.56 (*)     Hemoglobin 9.0 (*)     Hematocrit 28.8 (*)     .5 (*)     MCH 35.2 (*)     MCHC 31.3 (*)     RDW 16.1 (*)     MPV 9.2 (*)     Neutrophils % 79.9 (*)     Lymphocytes % 12.9 (*)     Neutrophils Absolute 14.7 (*)     All other components within normal limits   BASIC METABOLIC PANEL W/ REFLEX TO MG FOR LOW K - Abnormal; Notable for the following components:    Sodium 131 (*)     Chloride 89 (*)     Glucose 123 (*)     BUN 45 (*)     Creatinine 7.8 (*)     Est, Glom Filt Rate 5 (*)     Calcium 10.3 (*)     All other components within normal limits   TSH WITH REFLEX TO FT4 - Abnormal; Notable for the following components:    TSH Reflex FT4 5.89 (*)     All other components within normal limits   POCT GLUCOSE - Abnormal; Notable for the following components:    POC Glucose 128 (*)     All other components within normal limits   POCT GLUCOSE - Abnormal; Notable for the following components:    POC Glucose 120 (*)     All other components within normal limits   COVID-19, RAPID   CULTURE, BLOOD 1   CULTURE, BLOOD 2   ACETONE   T4, FREE   POCT GLUCOSE   POCT GLUCOSE   POCT GLUCOSE       All other labs were within normal range or not returned as of this dictation.     EMERGENCY DEPARTMENT COURSE and DIFFERENTIAL DIAGNOSIS/MDM:   Vitals:    Vitals:    10/21/22 2330 10/22/22 0515 10/22/22 0732 10/22/22 1138   BP: (!) 111/49 (!) 100/52 85/65 (!) 119/56   Pulse: 86 91 94 88   Resp: 20 18 16 16   Temp:   99 °F (37.2 °C) 98.1 °F (36.7 °C)   TempSrc:   Oral Oral   SpO2: 95% 91% 100% 99%   Weight:       Height:           MDM     Amount and/or Complexity of Data Reviewed  Clinical lab tests: reviewed  Tests in the radiology section of CPT®: reviewed  Decide to obtain previous medical records or to obtain history from someone other than the patient: yes    57-year-old female with history of chronic constipation presents with chief complaint of constipation. When patient began also complaining of nausea IV was started and basic labs were sent. Lab and radiology results reviewed. Initial labs showed that patient had a white blood cell count of 21,000. Further labs and CT scan was ordered. Patient also had elevation of lactic acid. Patient given IV Cipro and Flagyl due to CT scan with evidence of diffuse colitis. Radiologist also notes evidence of proctitis but that is most likely due to manual disimpaction. Discussed with Dr. Richard Melchor, hospitalist, who will admit patient for further evaluation and treatment. CONSULTS:  IP CONSULT TO NEPHROLOGY  IP CONSULT TO GI    PROCEDURES:  Unless otherwise noted below, none     Procedures    FINAL IMPRESSION      1. Colitis    2. Constipation, unspecified constipation type    3. Fecal impaction in rectum ([address]    4.  Leukocytosis, unspecified type          DISPOSITION/PLAN   DISPOSITION Admitted 10/21/2022 11:47:52 PM      PATIENT REFERRED TO:  Karthikeyan Cotto MD  [address] [place] [id number] [phone]    Schedule an appointment as soon as possible for a visit         DISCHARGE MEDICATIONS:  Current Discharge Medication List        START taking these medications    Details   Sodium Phosphates (FLEET) 7-19 GM/118ML Place 1 enema rectally nightly for 3 days  Qty: 3 each, Refills: 0                (Please note that portions of this note were completed with a voice recognition program.  Efforts were made to edit thedictations but occasionally words are mis-transcribed.)    Radha Alexis Rowan Flores MD (electronically signed)  Attending Emergency Physician         Woody Farmer MD  10/22/22 7153

## 2022-10-22 NOTE — CONSULTS
Nephrology ([facility]) Consult Note      Patient:  Adithya Yarbrough  YOB: 1960  Date of Service: 10/22/2022  MRN: [id number]   Acct: [de-identified]   Primary Care Physician: Vira Ly MD  Advance Directive: Full Code  Admit Date: 10/21/2022       Hospital Day: 1  Referring Provider: Madelyn Laguna MD    Patient independently seen and examined, Chart, Consults, Notes, Operative notes, Labs, Cardiology, and Radiology studies reviewed as available. Chief complaint: End-stage renal disease/abdominal pain. Subjective:  Adithya Yarbrough is a 58 y.o. female for whom we were consulted for evaluation and treatment of end-stage renal disease. She has type II diabetic nephropathy, gets home hemodialysis 4 times a week. Last dialysis was on Wednesday. Presented to the emergency room with complaining of severe constipation, abdominal pain. Her white count is 21,000 now diagnosed with acute bacterial colitis. Patient has history of insulin-dependent type 2 diabetes, hypertension, morbid abdominal obesity and chronic diastolic congestive heart failure. She denies any nausea vomiting or diarrhea but complaining of severe constipation. She also denies any shortness of breath. Currently seen on hemodialysis  Hemodialysis access: AV fistula  Hemodialysis: 3-1/2-hour  Ultrafiltration: 2500 cc  2K bath  Blood flow rate is 450 cc/min.     Allergies:  Codeine    Medicines:  Current Facility-Administered Medications   Medication Dose Route Frequency Provider Last Rate Last Admin    sodium chloride flush 0.9 % injection 5-40 mL  5-40 mL IntraVENous 2 times per day Taylor Hernandez MD   10 mL at 10/22/22 1111    sodium chloride flush 0.9 % injection 5-40 mL  5-40 mL IntraVENous PRN Taylor Hernandez MD        0.9 % sodium chloride infusion   IntraVENous PRN Taylor Hernandez MD        ondansetron (ZOFRAN-ODT) disintegrating tablet 4 mg  4 mg Oral Q8H PRN Taylor Hernandez MD        Or    ondansetron [facility]) injection 4 mg  4 mg IntraVENous Q6H PRN Alex Cantrell MD        heparin (porcine) injection 5,000 Units  5,000 Units SubCUTAneous 3 times per day Alex Cantrell MD        acetaminophen (TYLENOL) tablet 650 mg  650 mg Oral Q6H PRN Alex Cantrell MD        Or    acetaminophen (TYLENOL) suppository 650 mg  650 mg Rectal Q6H PRN Alex Cantrell MD        melatonin disintegrating tablet 5 mg  5 mg Oral Nightly PRN Alxe Cantrell MD   5 mg at 10/22/22 0319    polyethylene glycol (GLYCOLAX) packet 17 g  17 g Oral Daily PRN Alex Cantrell MD        calcium carbonate (TUMS) chewable tablet 500 mg  500 mg Oral TID PRN Alex Cantrell MD        aspirin chewable tablet 81 mg  81 mg Oral Daily Alex Cantrell MD   81 mg at 10/22/22 1101    busPIRone (BUSPAR) tablet 20 mg  20 mg Oral BID Alex Cantrell MD   20 mg at 10/22/22 1100    buPROPion [facility]) extended release tablet 150 mg  150 mg Oral BID Alex Cantrell MD   150 mg at 10/22/22 1101    calcitRIOL (ROCALTROL) capsule 1.5 mcg  1.5 mcg Oral Daily Alex Cantrell MD   1.5 mcg at 10/22/22 1101    allopurinol (ZYLOPRIM) tablet 100 mg  100 mg Oral BID Alex Cantrell MD   100 mg at 10/22/22 1101    cetirizine (ZYRTEC) tablet 10 mg  10 mg Oral PRN Alex Cantrell MD        citalopram (CELEXA) tablet 40 mg  40 mg Oral Daily Alex Cantrell MD   40 mg at 10/22/22 1101    lamoTRIgine (LAMICTAL) tablet 100 mg  100 mg Oral Nightly Alex Cantrell MD        levothyroxine (SYNTHROID) tablet 88 mcg  88 mcg Oral Daily Alex Cantrell MD   88 mcg at 10/22/22 1102    rosuvastatin (CRESTOR) tablet 20 mg  20 mg Oral Nightly Alex Cantrell MD        sevelamer (RENVELA) tablet 800 mg  800 mg Oral TID WC Alex Cantrell MD   800 mg at 10/22/22 1110    ascorbic acid (VITAMIN C) tablet 500 mg  500 mg Oral BID Alex Cantrell MD   500 mg at 10/22/22 1101    insulin glargine (LANTUS) injection vial 15 Units  15 Units SubCUTAneous Nightly Alex Cantrell MD        insulin glargine (LANTUS) injection vial 15 Units  15 Units SubCUTAneous Once Bryce Aquino MD        insulin lispro (HUMALOG) injection vial 0-16 Units  0-16 Units SubCUTAneous TID WC Bryce Aquino MD        insulin lispro (HUMALOG) injection vial 0-4 Units  0-4 Units SubCUTAneous Nightly Bryce Aquino MD        glucose chewable tablet 16 g  4 tablet Oral PRN Bryce Aquino MD        glucagon (rDNA) injection 1 mg  1 mg IntraMUSCular PRN Bryce Aquino MD        insulin lispro (HUMALOG) injection vial 4 Units  4 Units SubCUTAneous TID  Carolee Morris MD        0.9 % sodium chloride infusion   IntraVENous Continuous Carolee Morris  mL/hr at 10/22/22 1115 New Bag at 10/22/22 1115    ondansetron (ZOFRAN-ODT) disintegrating tablet 4 mg  4 mg Oral Once Kya Peralta MD        0.9 % sodium chloride bolus  250 mL IntraVENous Once Kya Peralta MD        ciprofloxacin (CIPRO) IVPB 400 mg  400 mg IntraVENous Q24H Bryce Aquino MD        metronidazole (FLAGYL) 500 mg in 0.9% NaCl 100 mL IVPB premix  500 mg IntraVENous Q8H Bryce Aquino  mL/hr at 10/22/22 1117 500 mg at 10/22/22 1117    mineral oil enema 1 enema  1 enema Rectal Daily PRN Bryce Aquino MD           Past Medical History:  Past Medical History:   Diagnosis Date    Anxiety     Arthritis     CHF (congestive heart failure) ([facility]     CKD (chronic kidney disease)     stage 4    Colon polyp     Depression     Embolism - blood clot 2004    Hemodialysis patient ([facility]     5 days a week at home, takes Sunday + one other day off each week    Hx of blood clots     Hyperlipidemia     Hypertension     Obesity, morbid, BMI 50 or higher ([facility]     Sleep apnea     CPAP    Thyroid disease     Type II or unspecified type diabetes mellitus without mention of complication, not stated as uncontrolled        Past Surgical History:  Past Surgical History:   Procedure Laterality Date    CHOLECYSTECTOMY, [address] N/A 08/11/2021 INSERTION CATHETER DIALYSIS performed by Chu Ruff MD at [facility] Left 06/18/2021    LEFT BRACHIAL-CEPHALIC AV FISTULA CREATION performed by Chu Ruff MD at [facility] Left 07/27/2021    LEFT UPPER EXTREMITY CEPHALIC VEIN SUPERFICIALIZATION performed by Chu Ruff MD at [place] [address]  2005    FISTULAGRAM Left 09/30/2022    LEFT UPPER EXTREMITY AV FISTULAGRAM, POSSIBLE BALLOON ANGIOPLASTY, STENT POSS REVISION performed by Chu Ruff MD at [address] [place] [address] ([address] [place] [address])  10/17/2018    POLYPECTOMY      ~2012 had colon polyps, 2022 had a benign polyp and 2 possibly cancerous polyps, DUE FOR REPEAT IN DEC 2022    RECTAL SURGERY  1981    fistula repair    TONSILLECTOMY AND ADENOIDECTOMY      at age 12 years    VASCULAR SURGERY  07/12/2021    [facility]- Ultrasound-guided cannulation left distal upper arm cephalic vein with 4 Hungarian glide sheath, Left upper extremity fistulogram's including venography the superior vena cava    VASCULAR SURGERY  07/27/2021    [facility]- Superficialization of the left upper arm cephalic vein arteriovenous fistula       Family History  Family History   Problem Relation Age of Onset    Lung Cancer Mother     Brain Cancer Mother     Heart Attack Father     Prostate Cancer Father     Heart Disease Father     Stroke Father     Obesity Father     No Known Problems Brother     Uterine Cancer Maternal Grandmother     Cervical Cancer Maternal Grandmother     Diabetes Maternal Grandfather     Other Maternal Grandfather         histoplasmosis    Obesity Paternal Grandmother     Diabetes Paternal Grandfather     Kidney Disease Paternal Grandfather     Other Daughter         Tourettes, Lidia Tabes syndrome       Social History  Social History     Socioeconomic History    Marital status:      Spouse name: Mr. 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 Loria Bosworth, Her , [phone]    AMBULATES: uses a wheel chair when out, walks at home independently    DOMICILED: has stairs in her home to the [facility] and washer which she does not use     Social Determinants of Health     Financial Resource Strain: Not on file   Food Insecurity: Not on file   Transportation Needs: Not on file   Physical Activity: Not on file   Stress: Not on file   Social Connections: Not on file   Intimate Partner Violence: Not on file   Housing Stability: Not on file         Review of Systems:  History obtained from chart review and the patient  General ROS: No fever or chills  Respiratory ROS: No cough, shortness of breath, wheezing  Cardiovascular ROS: No chest pain or palpitations  Gastrointestinal ROS: positive for - abdominal pain  Genito-Urinary ROS: No dysuria or hematuria  Musculoskeletal ROS: No joint pain or swelling   14 point ROS reviewed with the patient and negative except as noted above and in the HPI unless unable to obtain.     Objective:  Patient Vitals for the past 24 hrs:   BP Temp Temp src Pulse Resp SpO2 Height Weight   10/22/22 1138 (!) 119/56 98.1 °F (36.7 °C) Oral 88 16 99 % -- --   10/22/22 0732 85/65 99 °F (37.2 °C) Oral 94 16 100 % -- --   10/22/22 0515 (!) 100/52 -- -- 91 18 91 % -- --   10/21/22 2330 (!) 111/49 -- -- 86 20 95 % -- --   10/21/22 1341 (!) 98/53 99.1 °F (37.3 °C) Oral 90 20 100 % 5' 5\" (1.651 m) (!) 315 lb 4.1 oz (143 kg)       Intake/Output Summary (Last 24 hours) at 10/22/2022 1159  Last data filed at 10/22/2022 1000  Gross per 24 hour   Intake 240 ml   Output --   Net 240 ml     General: awake/alert   HEENT: Normocephalic atraumatic head  Neck: Supple with no JVD or carotid bruits. Chest:  clear to auscultation bilaterally  CVS: regular rate and rhythm  Abdominal: soft, nontender, normal bowel sounds  Extremities: no cyanosis or edema  Skin: warm and dry without rash      Labs:  BMP:   Recent Labs     10/21/22  2134 10/22/22  0859   * 131*   K 4.2 4.0   CL 88* 89*   CO2 23 24   BUN 39* 45*   CREATININE 6.9* 7.8*   CALCIUM 10.4* 10.3*     CBC:   Recent Labs     10/21/22  2134 10/22/22  0859   WBC 21.4* 18.4*   HGB 10.1* 9.0*   HCT 31.3* 28.8*   .4* 112.5*    266     LIVER PROFILE:   Recent Labs     10/21/22  2134   AST 15   ALT 10   BILITOT 0.4   ALKPHOS 93     PT/INR: No results for input(s): PROTIME, INR in the last 72 hours. APTT: No results for input(s): APTT in the last 72 hours. BNP:  No results for input(s): BNP in the last 72 hours. Ionized Calcium:No results for input(s): IONCA in the last 72 hours. Magnesium:No results for input(s): MG in the last 72 hours. Phosphorus:No results for input(s): PHOS in the last 72 hours. HgbA1C: No results for input(s): LABA1C in the last 72 hours. Hepatic:   Recent Labs     10/21/22  2134   ALKPHOS 93   ALT 10   AST 15   PROT 8.3   BILITOT 0.4   LABALBU 4.4     Lactic Acid:   Recent Labs     10/22/22  0859   LACTA 2.4*     Troponin: No results for input(s): CKTOTAL, CKMB, TROPONINT in the last 72 hours. ABGs: No results for input(s): PH, PCO2, PO2, HCO3, O2SAT in the last 72 hours. CRP:  No results for input(s): CRP in the last 72 hours. Sed Rate:  No results for input(s): SEDRATE in the last 72 hours. Cultures:   No results for input(s): CULTURE in the last 72 hours.   No results for input(s): BC, Racheal Mathis in the last 72 hours. No results for input(s): CXSURG in the last 72 hours. Radiology reports as per the Radiologist  Radiology: CT ABDOMEN PELVIS WO CONTRAST Additional Contrast? None    Result Date: 10/21/2022  Patient: Danny Pierson  Time Out: 23:25Exam(s): CT ABDOMEN + PELVIS Without Contrast EXAM:  CT Abdomen and Pelvis Without Intravenous ContrastCLINICAL HISTORY:   Reason for exam: abdominal pain. TECHNIQUE:  Axial computed tomography images of the abdomen and pelvis without intravenous contrast.COMPARISON:Comparison made to prior CT scan of abdomen pelvis from September 11, 2022. FINDINGS:  Lung bases:  Unremarkable. No mass. No consolidation. ABDOMEN:  Liver:  Unremarkable. Gallbladder and bile ducts: Status post cholecystectomy. No ductal dilation. Pancreas:  Unremarkable. No ductal dilation. Spleen:  Unremarkable. No splenomegaly. Adrenals:  Unremarkable. No mass. Kidneys and ureters: Advanced atrophy. No obstructing stones. No hydronephrosis. Stomach and bowel: There is thickening and edema of the distal transverse, descending, and sigmoid colon wall registered into the rectal wall with mild inflammation of the surrounding fat. Submucosal fat halo sign in the right colon. PELVIS:  Appendix:  No findings to suggest acute appendicitis. Bladder:  Unremarkable. No stones. Reproductive: Status post hysterectomy. ABDOMEN and PELVIS:  Intraperitoneal space:  Unremarkable. No free air. No significant fluid collection. Bones/joints:  Remote superior endplate fracture of the L2 vertebral body. No acute fracture. No dislocation. Soft tissues:  Unremarkable. Vasculature: IVC filter in place. No abdominal aortic aneurysm. Lymph nodes:  Unremarkable. No enlarged lymph nodes. Findings concerning for colitis of the transverse, descending and sigmoid colon with proctitis, which may be of infectious or inflammatory etiologies. Electronically signed by Franck Espinoza MD on 10-21-22 at 2325    XR ACUTE ABD SERIES CHEST 1 VW    Result Date: 10/21/2022  NO PRIOR REPORT AVAILABLE Exam: ACUTE ABDOMINAL SERIES Clinical data: Constipation, abdominal discomfort. Technique: Two views of the abdomen with frontal view of the chest. Prior studies: CT scan of abdomen and pelvis dated 9/11/22. Radiograph of chest dated 9/23/22. Findings: Supine and upright views of the abdomen, as well as an AP chest. The lungs are grossly clear. Cardiac silhouette is within normal limits. Air in loops of nondistended small bowel and colon with a nonspecific, nonobstructive bowel gas pattern. No evidence of small bowel obstruction or intraperitoneal free air. Liver appears enlarged in size. No acute osseous abnormality is detected. Surgical sutures seen in right upper quadrant. Mild degenerative spine. Multiple phleboliths seen in pelvis. IVC filter at the L3-L4 level. 1. Nonspecific nonobstructive bowel gas pattern. Liver appears enlarged in size 2. No adverse interval change. Recommendation: Follow up as clinically indicated. Electronically Signed by Amber Austin MD at 21-Oct-2022 10:56:30 PM EST                Assessment   1. End-stage renal disease on HHD/seen on hemodialysis. .  2.  Type II diabetic nephropathy. 3.  Anemia of chronic kidney disease. 4.  Acute bacterial colitis. 5.  Severe constipation. 6.  Severe abdominal obesity    Plan:  1. Tolerating dialysis well. 2.  IV fluid to [facility]. 3.  Continue IV antibiotics  4. Start JEAN CLAUDE. Thank you for the consult, we appreciate the opportunity to provide care to your patients. Feel free to contact me if I can be of any further assistance.       Valdene Collet, MD  10/22/22  11:59 AM

## 2022-10-22 NOTE — ED NOTES
Pt stated enema did not work and would like a manual disimpaction. Pt vomiting at this time.      Fred Das RN  10/21/22 0473

## 2022-10-23 LAB
ALBUMIN SERPL-MCNC: 4.4 G/DL (ref 3.5–5.2)
ALP BLD-CCNC: 91 U/L (ref 35–104)
ALT SERPL-CCNC: 8 U/L (ref 5–33)
ANION GAP SERPL CALCULATED.3IONS-SCNC: 14 MMOL/L (ref 7–19)
AST SERPL-CCNC: 13 U/L (ref 5–32)
BASOPHILS ABSOLUTE: 0.1 K/UL (ref 0–0.2)
BASOPHILS RELATIVE PERCENT: 0.5 % (ref 0–1)
BILIRUB SERPL-MCNC: 0.3 MG/DL (ref 0.2–1.2)
BUN BLDV-MCNC: 22 MG/DL (ref 8–23)
CALCIUM SERPL-MCNC: 10.6 MG/DL (ref 8.8–10.2)
CHLORIDE BLD-SCNC: 92 MMOL/L (ref 98–111)
CO2: 30 MMOL/L (ref 22–29)
CREAT SERPL-MCNC: 5.2 MG/DL (ref 0.5–0.9)
EOSINOPHILS ABSOLUTE: 0.3 K/UL (ref 0–0.6)
EOSINOPHILS RELATIVE PERCENT: 2.5 % (ref 0–5)
GFR SERPL CREATININE-BSD FRML MDRD: 9 ML/MIN/{1.73_M2}
GLUCOSE BLD-MCNC: 124 MG/DL (ref 70–99)
GLUCOSE BLD-MCNC: 136 MG/DL (ref 74–109)
GLUCOSE BLD-MCNC: 159 MG/DL (ref 70–99)
GLUCOSE BLD-MCNC: 218 MG/DL (ref 70–99)
GLUCOSE BLD-MCNC: 89 MG/DL (ref 70–99)
HCT VFR BLD CALC: 31.1 % (ref 37–47)
HEMOGLOBIN: 9.6 G/DL (ref 12–16)
IMMATURE GRANULOCYTES #: 0.5 K/UL
LACTIC ACID: 1.7 MMOL/L (ref 0.5–1.9)
LACTIC ACID: 3 MMOL/L (ref 0.5–1.9)
LYMPHOCYTES ABSOLUTE: 2 K/UL (ref 1.1–4.5)
LYMPHOCYTES RELATIVE PERCENT: 15.3 % (ref 20–40)
MCH RBC QN AUTO: 35.3 PG (ref 27–31)
MCHC RBC AUTO-ENTMCNC: 30.9 G/DL (ref 33–37)
MCV RBC AUTO: 114.3 FL (ref 81–99)
MONOCYTES ABSOLUTE: 0.8 K/UL (ref 0–0.9)
MONOCYTES RELATIVE PERCENT: 6.2 % (ref 0–10)
NEUTROPHILS ABSOLUTE: 9.4 K/UL (ref 1.5–7.5)
NEUTROPHILS RELATIVE PERCENT: 71.7 % (ref 50–65)
PDW BLD-RTO: 16.2 % (ref 11.5–14.5)
PERFORMED ON: ABNORMAL
PERFORMED ON: NORMAL
PLATELET # BLD: 252 K/UL (ref 130–400)
PMV BLD AUTO: 9.2 FL (ref 9.4–12.3)
POTASSIUM SERPL-SCNC: 4 MMOL/L (ref 3.5–5)
RBC # BLD: 2.72 M/UL (ref 4.2–5.4)
SODIUM BLD-SCNC: 136 MMOL/L (ref 136–145)
TOTAL PROTEIN: 7.2 G/DL (ref 6.6–8.7)
WBC # BLD: 13.2 K/UL (ref 4.8–10.8)

## 2022-10-23 PROCEDURE — 82947 ASSAY GLUCOSE BLOOD QUANT: CPT

## 2022-10-23 PROCEDURE — 2500000003 HC RX 250 WO HCPCS: Performed by: HOSPITALIST

## 2022-10-23 PROCEDURE — 80053 COMPREHEN METABOLIC PANEL: CPT

## 2022-10-23 PROCEDURE — 1210000000 HC MED SURG R&B

## 2022-10-23 PROCEDURE — 6360000002 HC RX W HCPCS: Performed by: HOSPITALIST

## 2022-10-23 PROCEDURE — 83605 ASSAY OF LACTIC ACID: CPT

## 2022-10-23 PROCEDURE — 85025 COMPLETE CBC W/AUTO DIFF WBC: CPT

## 2022-10-23 PROCEDURE — 6370000000 HC RX 637 (ALT 250 FOR IP): Performed by: HOSPITALIST

## 2022-10-23 PROCEDURE — 2580000003 HC RX 258: Performed by: HOSPITALIST

## 2022-10-23 PROCEDURE — 36415 COLL VENOUS BLD VENIPUNCTURE: CPT

## 2022-10-23 RX ADMIN — ALLOPURINOL 100 MG: 100 TABLET ORAL at 09:24

## 2022-10-23 RX ADMIN — METRONIDAZOLE 500 MG: 500 INJECTION, SOLUTION INTRAVENOUS at 16:36

## 2022-10-23 RX ADMIN — BUPROPION HYDROCHLORIDE 150 MG: 150 TABLET, EXTENDED RELEASE ORAL at 21:52

## 2022-10-23 RX ADMIN — OXYCODONE HYDROCHLORIDE AND ACETAMINOPHEN 500 MG: 500 TABLET ORAL at 09:24

## 2022-10-23 RX ADMIN — BUSPIRONE HYDROCHLORIDE 20 MG: 10 TABLET ORAL at 09:24

## 2022-10-23 RX ADMIN — ALLOPURINOL 100 MG: 100 TABLET ORAL at 21:52

## 2022-10-23 RX ADMIN — LEVOTHYROXINE SODIUM 88 MCG: 0.09 TABLET ORAL at 05:31

## 2022-10-23 RX ADMIN — HEPARIN SODIUM 5000 UNITS: 5000 INJECTION INTRAVENOUS; SUBCUTANEOUS at 05:31

## 2022-10-23 RX ADMIN — POLYETHYLENE GLYCOL 3350 17 G: 17 POWDER, FOR SOLUTION ORAL at 09:29

## 2022-10-23 RX ADMIN — SEVELAMER CARBONATE 800 MG: 800 TABLET, FILM COATED ORAL at 16:26

## 2022-10-23 RX ADMIN — ROSUVASTATIN CALCIUM 20 MG: 20 TABLET, COATED ORAL at 21:52

## 2022-10-23 RX ADMIN — CALCITRIOL CAPSULES 0.25 MCG 1.5 MCG: 0.25 CAPSULE ORAL at 09:23

## 2022-10-23 RX ADMIN — BUPROPION HYDROCHLORIDE 150 MG: 150 TABLET, EXTENDED RELEASE ORAL at 09:24

## 2022-10-23 RX ADMIN — CIPROFLOXACIN 400 MG: 2 INJECTION, SOLUTION INTRAVENOUS at 22:28

## 2022-10-23 RX ADMIN — OXYCODONE HYDROCHLORIDE AND ACETAMINOPHEN 500 MG: 500 TABLET ORAL at 21:52

## 2022-10-23 RX ADMIN — LAMOTRIGINE 100 MG: 100 TABLET ORAL at 21:52

## 2022-10-23 RX ADMIN — SODIUM CHLORIDE 45 ML/HR: 9 INJECTION, SOLUTION INTRAVENOUS at 16:35

## 2022-10-23 RX ADMIN — SEVELAMER CARBONATE 800 MG: 800 TABLET, FILM COATED ORAL at 12:24

## 2022-10-23 RX ADMIN — METRONIDAZOLE 500 MG: 500 INJECTION, SOLUTION INTRAVENOUS at 09:30

## 2022-10-23 RX ADMIN — HEPARIN SODIUM 5000 UNITS: 5000 INJECTION INTRAVENOUS; SUBCUTANEOUS at 21:52

## 2022-10-23 RX ADMIN — METRONIDAZOLE 500 MG: 500 INJECTION, SOLUTION INTRAVENOUS at 00:19

## 2022-10-23 RX ADMIN — BUSPIRONE HYDROCHLORIDE 20 MG: 10 TABLET ORAL at 21:52

## 2022-10-23 RX ADMIN — Medication 10 ML: at 09:25

## 2022-10-23 RX ADMIN — ASPIRIN 81 MG 81 MG: 81 TABLET ORAL at 09:24

## 2022-10-23 RX ADMIN — CITALOPRAM HYDROBROMIDE 40 MG: 20 TABLET ORAL at 09:24

## 2022-10-23 RX ADMIN — ACETAMINOPHEN 650 MG: 325 TABLET, FILM COATED ORAL at 00:18

## 2022-10-23 RX ADMIN — HEPARIN SODIUM 5000 UNITS: 5000 INJECTION INTRAVENOUS; SUBCUTANEOUS at 12:24

## 2022-10-23 ASSESSMENT — PAIN SCALES - GENERAL: PAINLEVEL_OUTOF10: 3

## 2022-10-23 ASSESSMENT — PAIN DESCRIPTION - DESCRIPTORS: DESCRIPTORS: ACHING;CRAMPING

## 2022-10-23 ASSESSMENT — PAIN - FUNCTIONAL ASSESSMENT: PAIN_FUNCTIONAL_ASSESSMENT: ACTIVITIES ARE NOT PREVENTED

## 2022-10-23 ASSESSMENT — PAIN DESCRIPTION - LOCATION: LOCATION: HEAD

## 2022-10-23 ASSESSMENT — PAIN DESCRIPTION - ORIENTATION: ORIENTATION: MID

## 2022-10-23 NOTE — PROGRESS NOTES
[place] [facility] Progress Note    Patient:  Moi Perry  YOB: 1960  Date of Service: 10/23/2022  MRN: [id number]   Acct: [de-identified]   Primary Care Physician: Teofilo Mcnair MD  Advance Directive: Full Code  Admit Date: 10/21/2022       Hospital Day: 2      CHIEF COMPLAINT:     Chief Complaint   Patient presents with    Constipation     Constipated for 4 days. Taking stool softeners, miralax, and other laxatives, reports attempting manual disimpaction with no relief. 10/23/2022 8:33 AM  Subjective / Interval History:   10/23/2022  Patient seen and examined. Doing well. No new complaints. No acute changes or acute overnight event reported. Sitting comfortably in bed in no apparent acute distress. Denies any acute complaints or distress at this time. 10/22/2022  Patient seen and examined this AM.  Doing well. No new complaints. No acute changes or acute overnight event reported. Sitting comfortably in bed in no acute distress. Patient reported episode of bowel movement yesterday. Denies any acute complaints or distress at this time. Review of Systems:   Review of Systems  ROS: 14 point review of systems is negative except as specifically addressed above. ADULT DIET; Regular; 4 carb choices (60 gm/meal); Low Fat/Low Chol/High Fiber/2 gm Na; Low Sodium (2 gm); Low Potassium (Less than 3000 mg/day);  Low Phosphorus (Less than 1000 mg); 1000 ml    Intake/Output Summary (Last 24 hours) at 10/23/2022 3396  Last data filed at 10/22/2022 2019  Gross per 24 hour   Intake 560 ml   Output 2500 ml   Net -1940 ml         Medications:   sodium chloride      sodium chloride 125 mL/hr at 10/22/22 1115     Current Facility-Administered Medications   Medication Dose Route Frequency Provider Last Rate Last Admin    sodium chloride flush 0.9 % injection 5-40 mL  5-40 mL IntraVENous 2 times per day Thor Davila MD   10 mL at 10/22/22 1111    sodium chloride flush 0.9 % injection 5-40 mL 5-40 mL IntraVENous PRN Teodora Morton MD        0.9 % sodium chloride infusion   IntraVENous PRN Teodora Morton MD        ondansetron (ZOFRAN-ODT) disintegrating tablet 4 mg  4 mg Oral Q8H PRN Teodora Morton MD        Or    ondansetron [facility]) injection 4 mg  4 mg IntraVENous Q6H PRN Teodora Morton MD        heparin (porcine) injection 5,000 Units  5,000 Units SubCUTAneous 3 times per day Teodora Morton MD   5,000 Units at 10/23/22 0531    acetaminophen (TYLENOL) tablet 650 mg  650 mg Oral Q6H PRN Teodora Morton MD   650 mg at 10/23/22 0018    Or    acetaminophen (TYLENOL) suppository 650 mg  650 mg Rectal Q6H PRN Teodora Morton MD        melatonin disintegrating tablet 5 mg  5 mg Oral Nightly PRN Teodora Morton MD   5 mg at 10/22/22 2252    polyethylene glycol (GLYCOLAX) packet 17 g  17 g Oral Daily PRN Teodora Morton MD        calcium carbonate (TUMS) chewable tablet 500 mg  500 mg Oral TID PRN Teodora Morton MD        aspirin chewable tablet 81 mg  81 mg Oral Daily Teodora Morton MD   81 mg at 10/22/22 1101    busPIRone (BUSPAR) tablet 20 mg  20 mg Oral BID Teodora Morton MD   20 mg at 10/22/22 2056    buPROPion [facility]) extended release tablet 150 mg  150 mg Oral BID Teodora Morton MD   150 mg at 10/22/22 2055    calcitRIOL (ROCALTROL) capsule 1.5 mcg  1.5 mcg Oral Daily Teodora Morton MD   1.5 mcg at 10/22/22 1101    allopurinol (ZYLOPRIM) tablet 100 mg  100 mg Oral BID Teodora Morton MD   100 mg at 10/22/22 2056    cetirizine (ZYRTEC) tablet 10 mg  10 mg Oral PRN Teodora Morton MD        citalopram (CELEXA) tablet 40 mg  40 mg Oral Daily Teodora Morton MD   40 mg at 10/22/22 1101    lamoTRIgine (LAMICTAL) tablet 100 mg  100 mg Oral Nightly Teodora Morton MD   100 mg at 10/22/22 2055    levothyroxine (SYNTHROID) tablet 88 mcg  88 mcg Oral Daily Teodora Morton MD   88 mcg at 10/23/22 0531    rosuvastatin (CRESTOR) tablet 20 mg  20 mg Oral Nightly Teodora Morton MD   20 mg at 10/22/22 2055    sevelamer (RENVELA) tablet 800 mg  800 mg Oral TID  Bob Botello MD   800 mg at 10/22/22 4623    ascorbic acid (VITAMIN C) tablet 500 mg  500 mg Oral BID Bob Botello MD   500 mg at 10/22/22 2056    insulin glargine (LANTUS) injection vial 15 Units  15 Units SubCUTAneous Nightly Bob Botello MD        insulin glargine (LANTUS) injection vial 15 Units  15 Units SubCUTAneous Once Bob Botello MD        insulin lispro (HUMALOG) injection vial 0-16 Units  0-16 Units SubCUTAneous TID  Bob Botello MD        insulin lispro (HUMALOG) injection vial 0-4 Units  0-4 Units SubCUTAneous Nightly Bob Botello MD        glucose chewable tablet 16 g  4 tablet Oral PRN Bob Botello MD        glucagon (rDNA) injection 1 mg  1 mg IntraMUSCular PRN Bob Botello MD        insulin lispro (HUMALOG) injection vial 4 Units  4 Units SubCUTAneous TID  Margo Morales MD        0.9 % sodium chloride infusion   IntraVENous Continuous Margo Morales  mL/hr at 10/22/22 1115 New Bag at 10/22/22 1115    [START ON 10/24/2022] epoetin jose ramon-epbx (RETACRIT) injection 3,000 Units  3,000 Units SubCUTAneous Once per day on Mon Wed Fri Berenice Ruiz MD        ondansetron (ZOFRAN-ODT) disintegrating tablet 4 mg  4 mg Oral Once Rick Hogan MD        0.9 % sodium chloride bolus  250 mL IntraVENous Once Rick Hogan MD        ciprofloxacin (CIPRO) IVPB 400 mg  400 mg IntraVENous Q24H Bob Botello MD   Stopped at 10/23/22 0032    metronidazole (FLAGYL) 500 mg in 0.9% NaCl 100 mL IVPB premix  500 mg IntraVENous Q8H Bob Botello MD   Stopped at 10/23/22 0147    mineral oil enema 1 enema  1 enema Rectal Daily PRN Bob Botello MD             sodium chloride      sodium chloride 125 mL/hr at 10/22/22 1115      sodium chloride flush  5-40 mL IntraVENous 2 times per day    heparin (porcine)  5,000 Units SubCUTAneous 3 times per day    aspirin  81 mg Oral Daily    busPIRone  20 mg Oral BID    buPROPion  150 mg Oral BID calcitRIOL  1.5 mcg Oral Daily    allopurinol  100 mg Oral BID    citalopram  40 mg Oral Daily    lamoTRIgine  100 mg Oral Nightly    levothyroxine  88 mcg Oral Daily    rosuvastatin  20 mg Oral Nightly    sevelamer  800 mg Oral TID WC    vitamin C  500 mg Oral BID    insulin glargine  15 Units SubCUTAneous Nightly    insulin glargine  15 Units SubCUTAneous Once    insulin lispro  0-16 Units SubCUTAneous TID     insulin lispro  0-4 Units SubCUTAneous Nightly    insulin lispro  4 Units SubCUTAneous TID     [START ON 10/24/2022] epoetin jose ramon-epbx  3,000 Units SubCUTAneous Once per day on Mon Wed Fri    ondansetron  4 mg Oral Once    sodium chloride  250 mL IntraVENous Once    ciprofloxacin  400 mg IntraVENous Q24H    metroNIDAZOLE  500 mg IntraVENous Q8H     sodium chloride flush, sodium chloride, ondansetron **OR** ondansetron, acetaminophen **OR** acetaminophen, melatonin, polyethylene glycol, calcium carbonate, cetirizine, glucose, glucagon (rDNA), mineral oil  ADULT DIET; Regular; 4 carb choices (60 gm/meal); Low Fat/Low Chol/High Fiber/2 gm Na; Low Sodium (2 gm); Low Potassium (Less than 3000 mg/day);  Low Phosphorus (Less than 1000 mg); 1000 ml       Labs:   CBC with DIFF:  Recent Labs     10/21/22  2134 10/22/22  0859 10/23/22  0330   WBC 21.4* 18.4* 13.2*   RBC 2.86* 2.56* 2.72*   HGB 10.1* 9.0* 9.6*   HCT 31.3* 28.8* 31.1*   .4* 112.5* 114.3*   MCH 35.3* 35.2* 35.3*   MCHC 32.3* 31.3* 30.9*   RDW 16.0* 16.1* 16.2*    266 252   MPV 8.9* 9.2* 9.2*   NEUTOPHILPCT 84.4* 79.9* 71.7*   LYMPHOPCT 7.3* 12.9* 15.3*   MONOPCT 3.3 3.6 6.2   EOSRELPCT 0.9 1.0 2.5   BASOPCT 0.4 0.4 0.5   NEUTROABS 18.0* 14.7* 9.4*   LYMPHSABS 1.6 2.4 2.0   MONOSABS 0.70 0.70 0.80   EOSABS 0.20 0.20 0.30   BASOSABS 0.10 0.10 0.10         CMP/BMP:  Recent Labs     10/21/22  2134 10/22/22  0859 10/23/22  0330   * 131* 136   K 4.2 4.0 4.0   CL 88* 89* 92*   CO2 23 24 30*   ANIONGAP 24* 18 14   GLUCOSE 185* 123* 136* BUN 39* 45* 22   CREATININE 6.9* 7.8* 5.2*   LABGLOM 6* 5* 9*   CALCIUM 10.4* 10.3* 10.6*   PROT 8.3  --  7.2   LABALBU 4.4  --  4.4   BILITOT 0.4  --  0.3   ALKPHOS 93  --  91   ALT 10  --  8   AST 15  --  13           CRP:  No results for input(s): CRP in the last 72 hours. Sed Rate:  No results for input(s): SEDRATE in the last 72 hours. HgBA1c:  No components found for: HGBA1C  FLP:    Lab Results   Component Value Date/Time    TRIG 282 01/09/2013 10:44 AM    HDL 35 01/09/2013 10:44 AM    LDLCALC ND 09/08/2011 02:00 PM    LDLDIRECT 81 01/09/2013 10:44 AM     TSH:    Lab Results   Component Value Date/Time    TSH 2.710 06/25/2022 02:23 PM     Troponin T: No results for input(s): TROPONINI in the last 72 hours. Pro-BNP: No results for input(s): BNP in the last 72 hours. INR: No results for input(s): INR in the last 72 hours. ABGs: No results found for: PHART, PO2ART, [id number]  UA:  Recent Labs     10/22/22  0023   COLORU YELLOW   PHUR 6.0   CLARITYU Clear   SPECGRAV >=1.030   LEUKOCYTESUR SMALL*   UROBILINOGEN 0.2   BILIRUBINUR Negative   BLOODU TRACE-LYSED*   GLUCOSEU NEGATIVE           Culture Results:    No results for input(s): CXSURG in the last 720 hours. Blood Culture Recent:   Recent Labs     10/21/22  2253   BC No Growth to date. Any change in status will be called. Recent Labs     10/21/22  2253 10/21/22  2313   BC No Growth to date. Any change in status will be called. --    BLOODCULT2  --  No Growth to date. Any change in status will be called. Cultures:   No results for input(s): CULTURE in the last 72 hours. Recent Labs     10/21/22  2253 10/21/22  2313   BC No Growth to date. Any change in status will be called. --    BLOODCULT2  --  No Growth to date. Any change in status will be called. No results for input(s): CXSURG in the last 72 hours. No results for input(s): MG, PHOS in the last 72 hours.   Recent Labs     10/21/22  2134 10/23/22  0330   AST 15 13   ALT 10 8   BILITOT 0.4 0.3   ALKPHOS 93 91           RAD:   CT ABDOMEN PELVIS WO CONTRAST Additional Contrast? None    Result Date: 10/21/2022  Patient: Russell Loyd  Time Out: 23:25Exam(s): CT ABDOMEN + PELVIS Without Contrast EXAM:  CT Abdomen and Pelvis Without Intravenous ContrastCLINICAL HISTORY:   Reason for exam: abdominal pain. TECHNIQUE:  Axial computed tomography images of the abdomen and pelvis without intravenous contrast.COMPARISON:Comparison made to prior CT scan of abdomen pelvis from September 11, 2022. FINDINGS:  Lung bases:  Unremarkable. No mass. No consolidation. ABDOMEN:  Liver:  Unremarkable. Gallbladder and bile ducts: Status post cholecystectomy. No ductal dilation. Pancreas:  Unremarkable. No ductal dilation. Spleen:  Unremarkable. No splenomegaly. Adrenals:  Unremarkable. No mass. Kidneys and ureters: Advanced atrophy. No obstructing stones. No hydronephrosis. Stomach and bowel: There is thickening and edema of the distal transverse, descending, and sigmoid colon wall registered into the rectal wall with mild inflammation of the surrounding fat. Submucosal fat halo sign in the right colon. PELVIS:  Appendix:  No findings to suggest acute appendicitis. Bladder:  Unremarkable. No stones. Reproductive: Status post hysterectomy. ABDOMEN and PELVIS:  Intraperitoneal space:  Unremarkable. No free air. No significant fluid collection. Bones/joints:  Remote superior endplate fracture of the L2 vertebral body. No acute fracture. No dislocation. Soft tissues:  Unremarkable. Vasculature: IVC filter in place. No abdominal aortic aneurysm. Lymph nodes:  Unremarkable. No enlarged lymph nodes. Findings concerning for colitis of the transverse, descending and sigmoid colon with proctitis, which may be of infectious or inflammatory etiologies. Electronically signed by Tammi Shirley MD on 10-21-22 at 2325    XR CHEST (2 VW)    Result Date: 9/24/2022  EXAMINATION: Radiography of the Chest CLINICAL INDICATION: Surgical clearance COMPARISON: Prior study from 09/01/2021 is available. FINDINGS: Lungs/Pleura: Lungs are clear. No effusion. No pneumothorax. There is a 4 mm granuloma seen in the right upper lung. Mediastinum and Guillermina: Unremarkable. Heart and Vessels: Normal sized heart. Bones: No acute osseous abnormality. There is osteoarthritic changes of thoracic spine with calcification of the anterior longitudinal ligament. Lines/Tubes/Hardware: None     1. No acute process in the chest.    XR ACUTE ABD SERIES CHEST 1 VW    Result Date: 10/21/2022  NO PRIOR REPORT AVAILABLE Exam: ACUTE ABDOMINAL SERIES Clinical data: Constipation, abdominal discomfort. Technique: Two views of the abdomen with frontal view of the chest. Prior studies: CT scan of abdomen and pelvis dated 9/11/22. Radiograph of chest dated 9/23/22. Findings: Supine and upright views of the abdomen, as well as an AP chest. The lungs are grossly clear. Cardiac silhouette is within normal limits. Air in loops of nondistended small bowel and colon with a nonspecific, nonobstructive bowel gas pattern. No evidence of small bowel obstruction or intraperitoneal free air. Liver appears enlarged in size. No acute osseous abnormality is detected. Surgical sutures seen in right upper quadrant. Mild degenerative spine. Multiple phleboliths seen in pelvis. IVC filter at the L3-L4 level. 1. Nonspecific nonobstructive bowel gas pattern. Liver appears enlarged in size 2. No adverse interval change. Recommendation: Follow up as clinically indicated.  Electronically Signed by Chary Mcallister MD at 21-Oct-2022 10:56:30 PM EST                 Objective:   Vitals:   BP (!) 141/50   Pulse 88   Temp 97.5 °F (36.4 °C) (Axillary)   Resp 16   Ht 5' 5\" (1.651 m)   Wt (!) 315 lb 4.1 oz (143 kg)   LMP 01/01/2000   SpO2 99%   BMI 52.46 kg/m²     Patient Vitals for the past 24 hrs:   BP Temp Temp src Pulse Resp SpO2   10/23/22 0524 (!) 141/50 97.5 °F (36.4 °C) Axillary 88 16 99 %   10/23/22 0203 -- 97.3 °F (36.3 °C) Oral -- -- --   10/23/22 0015 115/67 99.2 °F (37.3 °C) Oral 95 16 95 %   10/22/22 2040 124/78 97.9 °F (36.6 °C) -- 98 16 94 %   10/22/22 1832 (!) 109/58 (!) 95.9 °F (35.5 °C) Temporal 100 16 97 %   10/22/22 1138 (!) 119/56 98.1 °F (36.7 °C) Oral 88 16 99 %         24HR INTAKE/OUTPUT:    Intake/Output Summary (Last 24 hours) at 10/23/2022 0833  Last data filed at 10/22/2022 2019  Gross per 24 hour   Intake 560 ml   Output 2500 ml   Net -1940 ml         Physical Exam  Vitals and nursing note reviewed. Constitutional:       General: She is not in acute distress. Appearance: Normal appearance. She is obese. She is not ill-appearing, toxic-appearing or diaphoretic. HENT:      Head: Normocephalic and atraumatic. Right Ear: External ear normal.      Left Ear: External ear normal.      Nose: Nose normal. No congestion or rhinorrhea. Mouth/Throat:      Mouth: Mucous membranes are moist.      Pharynx: Oropharynx is clear. No oropharyngeal exudate or posterior oropharyngeal erythema. Eyes:      General: No scleral icterus. Right eye: No discharge. Left eye: No discharge. Extraocular Movements: Extraocular movements intact. Conjunctiva/sclera: Conjunctivae normal.      Pupils: Pupils are equal, round, and reactive to light. Cardiovascular:      Rate and Rhythm: Normal rate and regular rhythm. Pulses: Normal pulses. Heart sounds: Normal heart sounds. No murmur heard. No friction rub. No gallop. Pulmonary:      Effort: Pulmonary effort is normal. No respiratory distress. Breath sounds: Normal breath sounds. No stridor. No wheezing, rhonchi or rales. Chest:      Chest wall: No tenderness. Abdominal:      General: Bowel sounds are normal. There is no distension. Palpations: Abdomen is soft. Tenderness: There is abdominal tenderness (Diffuse tenderness to palpation.   No guarding, rigidity or rebound tenderness noted. ). There is no guarding or rebound. Musculoskeletal:         General: No swelling, tenderness, deformity or signs of injury. Normal range of motion. Cervical back: Normal range of motion and neck supple. No rigidity. No muscular tenderness. Right lower leg: No edema. Left lower leg: No edema. Skin:     General: Skin is warm and dry. Capillary Refill: Capillary refill takes less than 2 seconds. Coloration: Skin is not jaundiced or pale. Findings: No bruising, erythema, lesion or rash. Neurological:      General: No focal deficit present. Mental Status: She is alert and oriented to person, place, and time. Cranial Nerves: No cranial nerve deficit. Sensory: No sensory deficit. Motor: No weakness. Coordination: Coordination normal.   Psychiatric:         Mood and Affect: Mood normal.         Behavior: Behavior normal.         Thought Content: Thought content normal.         Judgment: Judgment normal.       Assessment/plan:     Patient Active Problem List    Diagnosis Date Noted    Colitis 10/22/2022     Priority: High    Sepsis ([facility] 10/21/2022     Priority: Medium    Pyelonephritis 06/25/2022     Priority: Medium    ESRD on dialysis ([facility] 07/24/2018     Priority: Medium    Hypoxia     Acute hemodialysis encounter ([facility] 08/10/2021    Acute hypoxemic respiratory failure ([address] 08/10/2021    Diabetes ([facility] [address] 08/10/2021    Hypertension 08/10/2021    Allergic rhinitis with postnasal drip 04/06/2021    Deep vein thrombosis of lower extremity ([facility] 10/09/2019    Disease of liver 10/09/2019    Thyroid disease 02/26/2017    Sleep apnea 02/26/2017     Last Assessment & Plan:   Patient states that she currently uses a CPAP at night for obstructive sleep apnea. She states that she has noticed that she has had more drainage and sinus pressure causing throat and uvula swelling.   She states that she feels like she is gagging or choking at times while she is trying to sleep. She is concerned and wishes to be evaluated for this. I will refer to ENT at this time. I will also treat for a possible sinus infection, unable to do steroids due to her being an insulin-dependent diabetic. Mood disorder ([address] 02/26/2017    Type 2 DM with CKD stage 3 and hypertension ([address] 02/26/2017    Hypertriglyceridemia 02/26/2017    Anxiety 02/26/2017    Vitamin D deficiency 02/26/2017    Anemia 02/26/2017    Hypocalcemia 02/26/2017    Anemia of chronic renal failure 12/12/2016    Hypertension associated with diabetes ([address] 10/17/2016    B12 deficiency 10/17/2016    Mixed diabetic hyperlipidemia associated with type 2 diabetes mellitus ([address] 10/17/2016    Morbid obesity ([address] 09/15/2011    Embolism ([address] 01/01/2004     Updating deleted diagnoses         Hospital Problems             Last Modified POA    * (Principal) Sepsis ([address] 10/21/2022 Yes    Colitis 10/22/2022 Yes    ESRD on dialysis ([address] 10/22/2022 Yes    Morbid obesity ([address] 10/22/2022 Yes    Sleep apnea 10/22/2022 Yes    Overview Signed 5/3/2021  9:45 AM by Ebony Painter MA     Last Assessment & Plan:   Patient states that she currently uses a CPAP at night for obstructive sleep apnea. She states that she has noticed that she has had more drainage and sinus pressure causing throat and uvula swelling. She states that she feels like she is gagging or choking at times while she is trying to sleep. She is concerned and wishes to be evaluated for this. I will refer to ENT at this time. I will also treat for a possible sinus infection, unable to do steroids due to her being an insulin-dependent diabetic.          Type 2 DM with CKD stage 3 and hypertension ([address] 10/22/2022 Yes    Hypertriglyceridemia 10/22/2022 Yes    Deep vein thrombosis of lower extremity ([address] 10/22/2022 Yes    Hypertension 10/22/2022 Yes     Principal Problem:    Sepsis ([address]  Active Problems:    Colitis    ESRD on dialysis ([facility]    Morbid obesity ([facility]    Sleep apnea    Type 2 DM with CKD stage 3 and hypertension [facility]    Hypertriglyceridemia    Deep vein thrombosis of lower extremity (HCC)    Hypertension  Resolved Problems:    * No resolved hospital problems. *        Brief History/Summary:   Ms. Yasmin Carrillo, a 26-year-old female, history of CHF, CKD, HLD, HTN, GERI, DMT2, ESRD (on HD) presented to [facility] ED (10/21/2022), on account of an acute onset of nausea/4-day history of constipation. Patient admitted to [facility] (10/21/2022), further work-up and management. Sepsis  Colitis  Leukocytosis with a WBC of 21.4 (10/21/2022)  CT abdomen pelvis (10/21/2022): Findings concerning for colitis of the transverse, descending and sigmoid colon with proctitis, which may be of infectious or inflammatory etiologies  Antibiotic: Ciprofloxacin and Metronidazole  Lactic acid of 3.7 --> 2.4  (10/22/2022)  IV fluids  Trend lactic acid level  GI on board-appreciate recommendations  Continue symptomatic and supportive management including pain management as needed. ESRD, on scheduled HD   Nephrology consult  Continue scheduled HD as per nephrology rec     Diabetes Mellitus II, with hyperglycemia  Uncontrolled  Inpatient Regimens to include;  - Insulin Glargine (Lantus) 15 units subcu nightly  - Insulin Lispro (Humalog) 4 units subcu pre-meal 3 times a day  - Insulin Lispro (Humalog) on a High dose sliding scale  Monitor POC glucose, and adjust insulin regimen accordingly based on daily insulin requirement. Hypothyroidism  Levothyroxine 88 mcg p.o. daily  TSH level: 1.1 (10/22/2022)       Continue management of other chronic medical conditions - see above and orders. Advance Directive: Full Code    ADULT DIET; Regular; 4 carb choices (60 gm/meal); Low Fat/Low Chol/High Fiber/2 gm Na; Low Sodium (2 gm); Low Potassium (Less than 3000 mg/day);  Low Phosphorus (Less than 1000 mg); 1000 ml         Consults Made:   IP CONSULT TO NEPHROLOGY  IP CONSULT TO GI      DVT prophylaxis: Heparin        Discharge planning:   Continue management as noted above    Time Spent is  25 mins  in the examination, evaluation, counseling and review of medications, assessment and plan.      Electronically signed by   Nesha Ruggiero MD, MPH, MD,   Internal Medicine Hospitalist   10/23/2022 8:33 AM

## 2022-10-23 NOTE — PROGRESS NOTES
Nephrology ([facility]) Progress Note      Patient:  Bianca Mcdonald  YOB: 1960  Date of Service: 10/23/2022  MRN: [id number]   Acct: [de-identified]   Primary Care Physician: Maren Alejo MD  Advance Directive: Full Code  Admit Date: 10/21/2022       Hospital Day: 2  Referring Provider: Lexi De Luna MD    Patient independently seen and examined, Chart, Consults, Notes, Operative notes, Labs, Cardiology, and Radiology studies reviewed as available. Chief complaint: End-stage renal disease/abdominal pain. Subjective:  Bianca Mcdonald is a 58 y.o. female for whom we were consulted for evaluation and treatment of end-stage renal disease. She has type II diabetic nephropathy, gets home hemodialysis 4 times a week. Last dialysis was on Wednesday. Presented to the emergency room with complaining of severe constipation, abdominal pain. Her white count is 21,000 now diagnosed with acute bacterial colitis. Patient has history of insulin-dependent type 2 diabetes, hypertension, morbid abdominal obesity and chronic diastolic congestive heart failure. She denies any nausea vomiting or diarrhea but complaining of severe constipation. She also denies any shortness of breath. This morning she denies any shortness of breath but still complaining of constipation.     Allergies:  Codeine    Medicines:  Current Facility-Administered Medications   Medication Dose Route Frequency Provider Last Rate Last Admin    sodium chloride flush 0.9 % injection 5-40 mL  5-40 mL IntraVENous 2 times per day nAa Garrett MD   10 mL at 10/23/22 0925    sodium chloride flush 0.9 % injection 5-40 mL  5-40 mL IntraVENous PRN Ana Garrett MD        0.9 % sodium chloride infusion   IntraVENous PRN Ana Garrett MD        ondansetron (ZOFRAN-ODT) disintegrating tablet 4 mg  4 mg Oral Q8H PRN Ana Garrett MD        Or    ondansetron [facility]) injection 4 mg  4 mg IntraVENous Q6H PRN Ana Garrett MD heparin (porcine) injection 5,000 Units  5,000 Units SubCUTAneous 3 times per day Lisa Anderson MD   5,000 Units at 10/23/22 1224    acetaminophen (TYLENOL) tablet 650 mg  650 mg Oral Q6H PRN Lisa Anderson MD   650 mg at 10/23/22 0018    Or    acetaminophen (TYLENOL) suppository 650 mg  650 mg Rectal Q6H PRN Lisa Anderson MD        melatonin disintegrating tablet 5 mg  5 mg Oral Nightly PRN Lisa Anderson MD   5 mg at 10/22/22 2252    polyethylene glycol (GLYCOLAX) packet 17 g  17 g Oral Daily PRN Lisa Anderson MD   17 g at 10/23/22 0929    calcium carbonate (TUMS) chewable tablet 500 mg  500 mg Oral TID PRN Lisa Anderson MD        aspirin chewable tablet 81 mg  81 mg Oral Daily Lisa Anderson MD   81 mg at 10/23/22 0924    busPIRone (BUSPAR) tablet 20 mg  20 mg Oral BID Lisa Anderson MD   20 mg at 10/23/22 4143    buPROPion [facility]) extended release tablet 150 mg  150 mg Oral BID Lisa Anderson MD   150 mg at 10/23/22 9595    calcitRIOL (ROCALTROL) capsule 1.5 mcg  1.5 mcg Oral Daily Lisa Anderson MD   1.5 mcg at 10/23/22 1104    allopurinol (ZYLOPRIM) tablet 100 mg  100 mg Oral BID Lisa Anderson MD   100 mg at 10/23/22 4561    cetirizine (ZYRTEC) tablet 10 mg  10 mg Oral PRN Lisa Anderson MD        citalopram (CELEXA) tablet 40 mg  40 mg Oral Daily Lisa Anderson MD   40 mg at 10/23/22 0240    lamoTRIgine (LAMICTAL) tablet 100 mg  100 mg Oral Nightly Lisa Anderson MD   100 mg at 10/22/22 2055    levothyroxine (SYNTHROID) tablet 88 mcg  88 mcg Oral Daily Lisa Anderson MD   88 mcg at 10/23/22 0531    rosuvastatin (CRESTOR) tablet 20 mg  20 mg Oral Nightly Lisa Anderson MD   20 mg at 10/22/22 2055    sevelamer (RENVELA) tablet 800 mg  800 mg Oral TID WC Lisa Anderson MD   800 mg at 10/23/22 1224    ascorbic acid (VITAMIN C) tablet 500 mg  500 mg Oral BID Lsia Anderson MD   500 mg at 10/23/22 0924    insulin glargine (LANTUS) injection vial 15 Units  15 Units SubCUTAneous Nightly Brooklyn Heller Giacomo Ross MD        insulin glargine (LANTUS) injection vial 15 Units  15 Units SubCUTAneous Once Teodora Morton MD        insulin lispro (HUMALOG) injection vial 0-16 Units  0-16 Units SubCUTAneous TID  Teodora Morton MD        insulin lispro (HUMALOG) injection vial 0-4 Units  0-4 Units SubCUTAneous Nightly Teodora Morton MD        glucose chewable tablet 16 g  4 tablet Oral PRN Teodora Morton MD        glucagon (rDNA) injection 1 mg  1 mg IntraMUSCular PRN Teodora Morton MD        insulin lispro (HUMALOG) injection vial 4 Units  4 Units SubCUTAneous TID  Dorian Meza MD        [START ON 10/24/2022] epoetin jose ramon-epbx (RETACRIT) injection 3,000 Units  3,000 Units SubCUTAneous Once per day on Mon Wed Fri Basil Perez MD        ondansetron (ZOFRAN-ODT) disintegrating tablet 4 mg  4 mg Oral Once Nik Carlson MD        0.9 % sodium chloride bolus  250 mL IntraVENous Once Nik Carlson MD        ciprofloxacin (CIPRO) IVPB 400 mg  400 mg IntraVENous Q24H Teodora Morton MD   Stopped at 10/23/22 0032    metronidazole (FLAGYL) 500 mg in 0.9% NaCl 100 mL IVPB premix  500 mg IntraVENous Q8H Teodora Morton MD   Stopped at 10/23/22 1030    mineral oil enema 1 enema  1 enema Rectal Daily PRN Teodora Morton MD           Past Medical History:  Past Medical History:   Diagnosis Date    Anxiety     Arthritis     CHF (congestive heart failure) ([facility]     CKD (chronic kidney disease)     stage 4    Colon polyp     Depression     Embolism - blood clot 2004    Hemodialysis patient ([facility]     5 days a week at home, takes Sunday + one other day off each week    Hx of blood clots     Hyperlipidemia     Hypertension     Obesity, morbid, BMI 50 or higher ([facility]     Sleep apnea     CPAP    Thyroid disease     Type II or unspecified type diabetes mellitus without mention of complication, not stated as uncontrolled        Past Surgical History:  Past Surgical History:   Procedure Laterality Date    CHOLECYSTECTOMY, LAPAROSCOPIC  1986    DIALYSIS CATHETER INSERTION N/A 08/11/2021    INSERTION CATHETER DIALYSIS performed by Eulalio Mckeon MD at [place] [id number] Left 06/18/2021    LEFT BRACHIAL-CEPHALIC AV FISTULA CREATION performed by Eulalio Mckeon MD at [place] [address] Left 07/27/2021    LEFT UPPER EXTREMITY CEPHALIC VEIN SUPERFICIALIZATION performed by Eulalio Mckeon MD at [address] [place] [address]  2005    FISTULAGRAM Left 09/30/2022    LEFT UPPER EXTREMITY AV FISTULAGRAM, POSSIBLE BALLOON ANGIOPLASTY, STENT POSS REVISION performed by Eulalio Mckeon MD at [address] [place] [address] ([address] [place] [address])  10/17/2018    POLYPECTOMY      ~2012 had colon polyps, 2022 had a benign polyp and 2 possibly cancerous polyps, DUE FOR REPEAT IN DEC 2022    RECTAL SURGERY  1981    fistula repair    TONSILLECTOMY AND ADENOIDECTOMY      at age 12 years    VASCULAR SURGERY  07/12/2021    [facility]- Ultrasound-guided cannulation left distal upper arm cephalic vein with 4 Liberian glide sheath, Left upper extremity fistulogram's including venography the superior vena cava    VASCULAR SURGERY  07/27/2021    [facility]- Superficialization of the left upper arm cephalic vein arteriovenous fistula       Family History  Family History   Problem Relation Age of Onset    Lung Cancer Mother     Brain Cancer Mother     Heart Attack Father     Prostate Cancer Father     Heart Disease Father     Stroke Father     Obesity Father     No Known Problems Brother     Uterine Cancer Maternal Grandmother     Cervical Cancer Maternal Grandmother     Diabetes Maternal Grandfather     Other Maternal Grandfather         histoplasmosis    Obesity Paternal Grandmother     Diabetes Paternal Grandfather     Kidney Disease Paternal Grandfather     Other Daughter         Tourettes, Lidia Tabes syndrome       Social History  Social History     Socioeconomic History    Marital status:      Spouse name: Mr. 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 Gabi Mendez, Her , [phone]    AMBULATES: uses a wheel chair when out, walks at home independently    DOMICILED: has stairs in her home to the [facility] and washer which she does not use     Social Determinants of Health     Financial Resource Strain: Not on file   Food Insecurity: Not on file   Transportation Needs: Not on file   Physical Activity: Not on file   Stress: Not on file   Social Connections: Not on file   Intimate Partner Violence: Not on file   Housing Stability: Not on file         Review of Systems:  History obtained from chart review and the patient  General ROS: No fever or chills  Respiratory ROS: No cough, shortness of breath, wheezing  Cardiovascular ROS: No chest pain or palpitations  Gastrointestinal ROS: positive for - abdominal pain  Genito-Urinary ROS: No dysuria or hematuria  Musculoskeletal ROS: No joint pain or swelling   14 point ROS reviewed with the patient and negative except as noted above and in the HPI unless unable to obtain.     Objective:  Patient Vitals for the past 24 hrs:   BP Temp Temp src Pulse Resp SpO2   10/23/22 1117 (!) 123/56 97.3 °F (36.3 °C) Oral 86 16 100 %   10/23/22 0524 (!) 141/50 97.5 °F (36.4 °C) Axillary 88 16 99 %   10/23/22 0203 -- 97.3 °F (36.3 °C) Oral -- -- --   10/23/22 0015 115/67 99.2 °F (37.3 °C) Oral 95 16 95 %   10/22/22 2040 124/78 97.9 °F (36.6 °C) -- 98 16 94 %   10/22/22 1832 (!) 109/58 (!) 95.9 °F (35.5 °C) Temporal 100 16 97 %       Intake/Output Summary (Last 24 hours) at 10/23/2022 1256  Last data filed at 10/22/2022 2019  Gross per 24 hour   Intake 320 ml   Output 2500 ml   Net -2180 ml     General: awake/alert   HEENT: Normocephalic atraumatic head  Neck: Supple with no JVD or carotid bruits. Chest:  clear to auscultation bilaterally  CVS: regular rate and rhythm  Abdominal: soft, nontender, normal bowel sounds  Extremities: no cyanosis or edema  Skin: warm and dry without rash      Labs:  BMP:   Recent Labs     10/21/22  2134 10/22/22  0859 10/23/22  0330   * 131* 136   K 4.2 4.0 4.0   CL 88* 89* 92*   CO2 23 24 30*   BUN 39* 45* 22   CREATININE 6.9* 7.8* 5.2*   CALCIUM 10.4* 10.3* 10.6*     CBC:   Recent Labs     10/21/22  2134 10/22/22  0859 10/23/22  0330   WBC 21.4* 18.4* 13.2*   HGB 10.1* 9.0* 9.6*   HCT 31.3* 28.8* 31.1*   .4* 112.5* 114.3*    266 252     LIVER PROFILE:   Recent Labs     10/21/22  2134 10/23/22  0330   AST 15 13   ALT 10 8   BILITOT 0.4 0.3   ALKPHOS 93 91     PT/INR: No results for input(s): PROTIME, INR in the last 72 hours. APTT: No results for input(s): APTT in the last 72 hours. BNP:  No results for input(s): BNP in the last 72 hours. Ionized Calcium:No results for input(s): IONCA in the last 72 hours. Magnesium:No results for input(s): MG in the last 72 hours. Phosphorus:No results for input(s): PHOS in the last 72 hours. HgbA1C: No results for input(s): LABA1C in the last 72 hours. Hepatic:   Recent Labs     10/21/22  2134 10/23/22  0330   ALKPHOS 93 91   ALT 10 8   AST 15 13   PROT 8.3 7.2   BILITOT 0.4 0.3   LABALBU 4.4 4.4     Lactic Acid:   Recent Labs     10/22/22  0859   LACTA 2.4*     Troponin: No results for input(s): CKTOTAL, CKMB, TROPONINT in the last 72 hours.   ABGs: No results for input(s): PH, PCO2, PO2, HCO3, O2SAT in the last 72 hours. CRP:  No results for input(s): CRP in the last 72 hours. Sed Rate:  No results for input(s): SEDRATE in the last 72 hours. Cultures:   No results for input(s): CULTURE in the last 72 hours. Recent Labs     10/21/22  2253 10/21/22  2313   BC No Growth to date. Any change in status will be called. --    BLOODCULT2  --  No Growth to date. Any change in status will be called. No results for input(s): CXSURG in the last 72 hours. Radiology reports as per the Radiologist  Radiology: CT ABDOMEN PELVIS WO CONTRAST Additional Contrast? None    Result Date: 10/21/2022  Patient: Vivien Chu  Time Out: 23:25Exam(s): CT ABDOMEN + PELVIS Without Contrast EXAM:  CT Abdomen and Pelvis Without Intravenous ContrastCLINICAL HISTORY:   Reason for exam: abdominal pain. TECHNIQUE:  Axial computed tomography images of the abdomen and pelvis without intravenous contrast.COMPARISON:Comparison made to prior CT scan of abdomen pelvis from September 11, 2022. FINDINGS:  Lung bases:  Unremarkable. No mass. No consolidation. ABDOMEN:  Liver:  Unremarkable. Gallbladder and bile ducts: Status post cholecystectomy. No ductal dilation. Pancreas:  Unremarkable. No ductal dilation. Spleen:  Unremarkable. No splenomegaly. Adrenals:  Unremarkable. No mass. Kidneys and ureters: Advanced atrophy. No obstructing stones. No hydronephrosis. Stomach and bowel: There is thickening and edema of the distal transverse, descending, and sigmoid colon wall registered into the rectal wall with mild inflammation of the surrounding fat. Submucosal fat halo sign in the right colon. PELVIS:  Appendix:  No findings to suggest acute appendicitis. Bladder:  Unremarkable. No stones. Reproductive: Status post hysterectomy. ABDOMEN and PELVIS:  Intraperitoneal space:  Unremarkable. No free air. No significant fluid collection. Bones/joints:  Remote superior endplate fracture of the L2 vertebral body.   No acute fracture. No dislocation. Soft tissues:  Unremarkable. Vasculature: IVC filter in place. No abdominal aortic aneurysm. Lymph nodes:  Unremarkable. No enlarged lymph nodes. Findings concerning for colitis of the transverse, descending and sigmoid colon with proctitis, which may be of infectious or inflammatory etiologies. Electronically signed by Walter Ramirez MD on 10-21-22 at 2325    XR ACUTE ABD SERIES CHEST 1 VW    Result Date: 10/21/2022  NO PRIOR REPORT AVAILABLE Exam: ACUTE ABDOMINAL SERIES Clinical data: Constipation, abdominal discomfort. Technique: Two views of the abdomen with frontal view of the chest. Prior studies: CT scan of abdomen and pelvis dated 9/11/22. Radiograph of chest dated 9/23/22. Findings: Supine and upright views of the abdomen, as well as an AP chest. The lungs are grossly clear. Cardiac silhouette is within normal limits. Air in loops of nondistended small bowel and colon with a nonspecific, nonobstructive bowel gas pattern. No evidence of small bowel obstruction or intraperitoneal free air. Liver appears enlarged in size. No acute osseous abnormality is detected. Surgical sutures seen in right upper quadrant. Mild degenerative spine. Multiple phleboliths seen in pelvis. IVC filter at the L3-L4 level. 1. Nonspecific nonobstructive bowel gas pattern. Liver appears enlarged in size 2. No adverse interval change. Recommendation: Follow up as clinically indicated. Electronically Signed by Ann Dc MD at 21-Oct-2022 10:56:30 PM EST                Assessment   1. End-stage renal disease on HHD. Bernabe Garcia 2.  Type II diabetic nephropathy. 3.  Anemia of chronic kidney disease. 4.  Acute bacterial colitis. 5.  Severe constipation. 6.  Severe abdominal obesity  7. Hyperphosphatemia    Plan:  1. Discontinue IV fluid  2. Resume phosphorus binder Renvela  3. Continue JEAN CLAUDE. 4.  Plan was discussed with registered nurse.     Basil Perez MD  10/23/22  12:56 PM

## 2022-10-23 NOTE — PLAN OF CARE
Problem: ABCDS Injury Assessment  Goal: Absence of physical injury  Outcome: Progressing     Problem: Discharge Planning  Goal: Discharge to home or other facility with appropriate resources  Outcome: Progressing  Flowsheets (Taken 10/22/2022 2135)  Discharge to home or other facility with appropriate resources: Identify barriers to discharge with patient and caregiver     Problem: Pain  Goal: Verbalizes/displays adequate comfort level or baseline comfort level  Outcome: Progressing

## 2022-10-24 LAB
ALBUMIN SERPL-MCNC: 4.1 G/DL (ref 3.5–5.2)
ALP BLD-CCNC: 78 U/L (ref 35–104)
ALT SERPL-CCNC: 7 U/L (ref 5–33)
ANION GAP SERPL CALCULATED.3IONS-SCNC: 18 MMOL/L (ref 7–19)
AST SERPL-CCNC: 11 U/L (ref 5–32)
BASOPHILS ABSOLUTE: 0.1 K/UL (ref 0–0.2)
BASOPHILS RELATIVE PERCENT: 0.5 % (ref 0–1)
BILIRUB SERPL-MCNC: <0.2 MG/DL (ref 0.2–1.2)
BUN BLDV-MCNC: 37 MG/DL (ref 8–23)
CALCIUM SERPL-MCNC: 10.6 MG/DL (ref 8.8–10.2)
CHLORIDE BLD-SCNC: 93 MMOL/L (ref 98–111)
CO2: 25 MMOL/L (ref 22–29)
CREAT SERPL-MCNC: 7.3 MG/DL (ref 0.5–0.9)
EOSINOPHILS ABSOLUTE: 0.5 K/UL (ref 0–0.6)
EOSINOPHILS RELATIVE PERCENT: 3.9 % (ref 0–5)
GFR SERPL CREATININE-BSD FRML MDRD: 6 ML/MIN/{1.73_M2}
GLUCOSE BLD-MCNC: 123 MG/DL (ref 70–99)
GLUCOSE BLD-MCNC: 149 MG/DL (ref 70–99)
GLUCOSE BLD-MCNC: 67 MG/DL (ref 74–109)
GLUCOSE BLD-MCNC: 98 MG/DL (ref 70–99)
GLUCOSE BLD-MCNC: 98 MG/DL (ref 70–99)
HCT VFR BLD CALC: 25.7 % (ref 37–47)
HEMOGLOBIN: 8.2 G/DL (ref 12–16)
IMMATURE GRANULOCYTES #: 0.3 K/UL
LACTIC ACID: 1.3 MMOL/L (ref 0.5–1.9)
LYMPHOCYTES ABSOLUTE: 2.1 K/UL (ref 1.1–4.5)
LYMPHOCYTES RELATIVE PERCENT: 16.7 % (ref 20–40)
MCH RBC QN AUTO: 35.7 PG (ref 27–31)
MCHC RBC AUTO-ENTMCNC: 31.9 G/DL (ref 33–37)
MCV RBC AUTO: 111.7 FL (ref 81–99)
MONOCYTES ABSOLUTE: 0.8 K/UL (ref 0–0.9)
MONOCYTES RELATIVE PERCENT: 6.5 % (ref 0–10)
NEUTROPHILS ABSOLUTE: 8.9 K/UL (ref 1.5–7.5)
NEUTROPHILS RELATIVE PERCENT: 70.1 % (ref 50–65)
PDW BLD-RTO: 15.9 % (ref 11.5–14.5)
PERFORMED ON: ABNORMAL
PERFORMED ON: ABNORMAL
PERFORMED ON: NORMAL
PERFORMED ON: NORMAL
PLATELET # BLD: 221 K/UL (ref 130–400)
PMV BLD AUTO: 9.4 FL (ref 9.4–12.3)
POTASSIUM SERPL-SCNC: 3.7 MMOL/L (ref 3.5–5)
RBC # BLD: 2.3 M/UL (ref 4.2–5.4)
SODIUM BLD-SCNC: 136 MMOL/L (ref 136–145)
TOTAL PROTEIN: 6.3 G/DL (ref 6.6–8.7)
WBC # BLD: 12.7 K/UL (ref 4.8–10.8)

## 2022-10-24 PROCEDURE — 36415 COLL VENOUS BLD VENIPUNCTURE: CPT

## 2022-10-24 PROCEDURE — 85025 COMPLETE CBC W/AUTO DIFF WBC: CPT

## 2022-10-24 PROCEDURE — 2580000003 HC RX 258: Performed by: HOSPITALIST

## 2022-10-24 PROCEDURE — 6370000000 HC RX 637 (ALT 250 FOR IP): Performed by: INTERNAL MEDICINE

## 2022-10-24 PROCEDURE — 6360000002 HC RX W HCPCS: Performed by: INTERNAL MEDICINE

## 2022-10-24 PROCEDURE — 1210000000 HC MED SURG R&B

## 2022-10-24 PROCEDURE — 6360000002 HC RX W HCPCS: Performed by: HOSPITALIST

## 2022-10-24 PROCEDURE — 82947 ASSAY GLUCOSE BLOOD QUANT: CPT

## 2022-10-24 PROCEDURE — 2500000003 HC RX 250 WO HCPCS: Performed by: HOSPITALIST

## 2022-10-24 PROCEDURE — 6370000000 HC RX 637 (ALT 250 FOR IP): Performed by: HOSPITALIST

## 2022-10-24 PROCEDURE — 83605 ASSAY OF LACTIC ACID: CPT

## 2022-10-24 PROCEDURE — 80053 COMPREHEN METABOLIC PANEL: CPT

## 2022-10-24 RX ADMIN — BUSPIRONE HYDROCHLORIDE 20 MG: 10 TABLET ORAL at 20:41

## 2022-10-24 RX ADMIN — Medication 10 ML: at 10:13

## 2022-10-24 RX ADMIN — POLYETHYLENE GLYCOL 3350 17 G: 17 POWDER, FOR SOLUTION ORAL at 11:47

## 2022-10-24 RX ADMIN — ALLOPURINOL 100 MG: 100 TABLET ORAL at 20:42

## 2022-10-24 RX ADMIN — ALLOPURINOL 100 MG: 100 TABLET ORAL at 10:10

## 2022-10-24 RX ADMIN — ASPIRIN 81 MG 81 MG: 81 TABLET ORAL at 10:09

## 2022-10-24 RX ADMIN — SEVELAMER CARBONATE 800 MG: 800 TABLET, FILM COATED ORAL at 11:47

## 2022-10-24 RX ADMIN — OXYCODONE HYDROCHLORIDE AND ACETAMINOPHEN 500 MG: 500 TABLET ORAL at 10:10

## 2022-10-24 RX ADMIN — LAMOTRIGINE 100 MG: 100 TABLET ORAL at 20:41

## 2022-10-24 RX ADMIN — HEPARIN SODIUM 5000 UNITS: 5000 INJECTION INTRAVENOUS; SUBCUTANEOUS at 05:25

## 2022-10-24 RX ADMIN — EPOETIN ALFA-EPBX 3000 UNITS: 3000 INJECTION, SOLUTION INTRAVENOUS; SUBCUTANEOUS at 11:15

## 2022-10-24 RX ADMIN — BUPROPION HYDROCHLORIDE 150 MG: 150 TABLET, EXTENDED RELEASE ORAL at 20:42

## 2022-10-24 RX ADMIN — Medication 5 MG: at 01:22

## 2022-10-24 RX ADMIN — METRONIDAZOLE 500 MG: 500 INJECTION, SOLUTION INTRAVENOUS at 01:22

## 2022-10-24 RX ADMIN — SEVELAMER CARBONATE 800 MG: 800 TABLET, FILM COATED ORAL at 16:59

## 2022-10-24 RX ADMIN — ROSUVASTATIN CALCIUM 20 MG: 20 TABLET, COATED ORAL at 20:42

## 2022-10-24 RX ADMIN — BUSPIRONE HYDROCHLORIDE 20 MG: 10 TABLET ORAL at 10:10

## 2022-10-24 RX ADMIN — HEPARIN SODIUM 5000 UNITS: 5000 INJECTION INTRAVENOUS; SUBCUTANEOUS at 14:28

## 2022-10-24 RX ADMIN — BUPROPION HYDROCHLORIDE 150 MG: 150 TABLET, EXTENDED RELEASE ORAL at 10:10

## 2022-10-24 RX ADMIN — METRONIDAZOLE 500 MG: 500 INJECTION, SOLUTION INTRAVENOUS at 17:02

## 2022-10-24 RX ADMIN — METRONIDAZOLE 500 MG: 500 INJECTION, SOLUTION INTRAVENOUS at 10:19

## 2022-10-24 RX ADMIN — MAGNESIUM HYDROXIDE 30 ML: 400 SUSPENSION ORAL at 12:15

## 2022-10-24 RX ADMIN — HEPARIN SODIUM 5000 UNITS: 5000 INJECTION INTRAVENOUS; SUBCUTANEOUS at 20:41

## 2022-10-24 RX ADMIN — CALCITRIOL CAPSULES 0.25 MCG 1.5 MCG: 0.25 CAPSULE ORAL at 10:10

## 2022-10-24 RX ADMIN — LEVOTHYROXINE SODIUM 88 MCG: 0.09 TABLET ORAL at 05:25

## 2022-10-24 RX ADMIN — OXYCODONE HYDROCHLORIDE AND ACETAMINOPHEN 500 MG: 500 TABLET ORAL at 20:41

## 2022-10-24 RX ADMIN — CITALOPRAM HYDROBROMIDE 40 MG: 20 TABLET ORAL at 10:10

## 2022-10-24 NOTE — PROGRESS NOTES
Nephrology ([facility]) Progress Note    Patient:  Santiago Phillips  YOB: 1960  Date of Service: 10/24/2022  MRN: [id number]   Acct: [de-identified]   Primary Care Physician: Christina Manrique MD  Advance Directive: Full Code  Admit Date: 10/21/2022       Hospital Day: 3  Referring Provider: Miah Baker MD    Patient independently seen and examined, Chart, Consults, Notes, Operative notes, Labs, Cardiology, and Radiology studies reviewed as available. Subjective:  Santiago Phillips is a 58 y.o. female for whom we were consulted for evaluation and treatment of end-stage renal disease. She has type II diabetic nephropathy and gets home hemodialysis 4 times a week. Last dialysis was on Wednesday prior to admission. Presented to the emergency room with complaining of severe constipation, abdominal pain. Her white count is 21,000 now diagnosed with acute bacterial colitis. Patient has history of insulin-dependent type 2 diabetes, hypertension, morbid abdominal obesity and chronic diastolic congestive heart failure. She denied any nausea vomiting or diarrhea but complaining of severe constipation. She also denied any shortness of breath. Today, no overnight events. She notes continued difficulties having a bowel movements. She denied chest pain, shortness of air at rest, nausea or vomiting.     Allergies:  Codeine    Medicines:  Current Facility-Administered Medications   Medication Dose Route Frequency Provider Last Rate Last Admin    sodium chloride flush 0.9 % injection 5-40 mL  5-40 mL IntraVENous 2 times per day Sil Ku MD   10 mL at 10/24/22 1013    sodium chloride flush 0.9 % injection 5-40 mL  5-40 mL IntraVENous PRN Sil Ku MD        0.9 % sodium chloride infusion   IntraVENous PRN Sil Ku MD 45 mL/hr at 10/23/22 1635 45 mL/hr at 10/23/22 1635    ondansetron (ZOFRAN-ODT) disintegrating tablet 4 mg  4 mg Oral Q8H PRN Sil Ku MD        Or ondansetron (ZOFRAN) injection 4 mg  4 mg IntraVENous Q6H PRN Essie Aguirre MD        heparin (porcine) injection 5,000 Units  5,000 Units SubCUTAneous 3 times per day Essie Aguirre MD   5,000 Units at 10/24/22 0525    acetaminophen (TYLENOL) tablet 650 mg  650 mg Oral Q6H PRN Essie Aguirre MD   650 mg at 10/23/22 0018    Or    acetaminophen (TYLENOL) suppository 650 mg  650 mg Rectal Q6H PRN Essie Aguirre MD        melatonin disintegrating tablet 5 mg  5 mg Oral Nightly PRN Essie Aguirre MD   5 mg at 10/24/22 0122    polyethylene glycol (GLYCOLAX) packet 17 g  17 g Oral Daily PRN Essie Aguirre MD   17 g at 10/23/22 0929    calcium carbonate (TUMS) chewable tablet 500 mg  500 mg Oral TID PRN Essie Aguirre MD        aspirin chewable tablet 81 mg  81 mg Oral Daily Essie Aguirre MD   81 mg at 10/24/22 1009    busPIRone (BUSPAR) tablet 20 mg  20 mg Oral BID Essie Aguirre MD   20 mg at 10/24/22 1010    buPROPion [facility]) extended release tablet 150 mg  150 mg Oral BID Essie Aguirre MD   150 mg at 10/24/22 1010    calcitRIOL (ROCALTROL) capsule 1.5 mcg  1.5 mcg Oral Daily Essie Aguirre MD   1.5 mcg at 10/24/22 1010    allopurinol (ZYLOPRIM) tablet 100 mg  100 mg Oral BID Essie Aguirre MD   100 mg at 10/24/22 1010    cetirizine (ZYRTEC) tablet 10 mg  10 mg Oral PRN Essie Aguirre MD        citalopram (CELEXA) tablet 40 mg  40 mg Oral Daily Essie Aguirre MD   40 mg at 10/24/22 1010    lamoTRIgine (LAMICTAL) tablet 100 mg  100 mg Oral Nightly Essie Aguirre MD   100 mg at 10/23/22 2152    levothyroxine (SYNTHROID) tablet 88 mcg  88 mcg Oral Daily Essie Aguirre MD   88 mcg at 10/24/22 0525    rosuvastatin (CRESTOR) tablet 20 mg  20 mg Oral Nightly Essie Aguirre MD   20 mg at 10/23/22 2152    sevelamer (RENVELA) tablet 800 mg  800 mg Oral TID WC Essie Aguirre MD   800 mg at 10/23/22 1626    ascorbic acid (VITAMIN C) tablet 500 mg  500 mg Oral BID Essie Aguirre MD   500 mg at 10/24/22 1010    insulin glargine (LANTUS) injection vial 15 Units  15 Units SubCUTAneous Nightly Steven Rodríguez MD        insulin glargine (LANTUS) injection vial 15 Units  15 Units SubCUTAneous Once Steven Rodríguez MD        insulin lispro (HUMALOG) injection vial 0-16 Units  0-16 Units SubCUTAneous TID  Steven Rodríguez MD        insulin lispro (HUMALOG) injection vial 0-4 Units  0-4 Units SubCUTAneous Nightly Steven Rodríguez MD        glucose chewable tablet 16 g  4 tablet Oral PRN Steven Rodríguez MD        glucagon (rDNA) injection 1 mg  1 mg IntraMUSCular PRN Steven Rodríguez MD        insulin lispro (HUMALOG) injection vial 4 Units  4 Units SubCUTAneous TID  Dagoberto Robles MD        epoetin jose ramon-epbx (RETACRIT) injection 3,000 Units  3,000 Units SubCUTAneous Once per day on Mon Wed Fri Griselda Ard, MD   3,000 Units at 10/24/22 1115    ondansetron (ZOFRAN-ODT) disintegrating tablet 4 mg  4 mg Oral Once Shaw Kahn MD        0.9 % sodium chloride bolus  250 mL IntraVENous Once Shaw Kahn MD        ciprofloxacin (CIPRO) IVPB 400 mg  400 mg IntraVENous Q24H Steven Rodríguez MD   Stopped at 10/24/22 0003    metronidazole (FLAGYL) 500 mg in 0.9% NaCl 100 mL IVPB premix  500 mg IntraVENous Q8H Steven Rodríguez  mL/hr at 10/24/22 1019 500 mg at 10/24/22 1019    mineral oil enema 1 enema  1 enema Rectal Daily PRN Steven Rodríguez MD           Past Medical History:  Past Medical History:   Diagnosis Date    Anxiety     Arthritis     CHF (congestive heart failure) ([facility]     CKD (chronic kidney disease)     stage 4    Colon polyp     Depression     Embolism - blood clot 2004    Hemodialysis patient ([facility]     5 days a week at home, takes Sunday + one other day off each week    Hx of blood clots     Hyperlipidemia     Hypertension     Obesity, morbid, BMI 50 or higher ([facility]     Sleep apnea     CPAP    Thyroid disease     Type II or unspecified type diabetes mellitus without mention of complication, not stated as uncontrolled Past Surgical History:  Past Surgical History:   Procedure Laterality Date    CHOLECYSTECTOMY, LAPAROSCOPIC  1986    DIALYSIS CATHETER INSERTION N/A 08/11/2021    INSERTION CATHETER DIALYSIS performed by Eulalio Mckeon MD at [facility] Left 06/18/2021    LEFT BRACHIAL-CEPHALIC AV FISTULA CREATION performed by Eulalio Mckeon MD at [facility] Left 07/27/2021    LEFT UPPER EXTREMITY CEPHALIC VEIN SUPERFICIALIZATION performed by Eulalio Mckeon MD at [address] [place] [address]  2005    FISTULAGRAM Left 09/30/2022    LEFT UPPER EXTREMITY AV FISTULAGRAM, POSSIBLE BALLOON ANGIOPLASTY, STENT POSS REVISION performed by Eulalio Mckeon MD at [address] [facility] ([address])  10/17/2018    POLYPECTOMY      ~2012 had colon polyps, 2022 had a benign polyp and 2 possibly cancerous polyps, DUE FOR REPEAT IN DEC 2022    RECTAL SURGERY  1981    fistula repair    TONSILLECTOMY AND ADENOIDECTOMY      at age 12 years    VASCULAR SURGERY  07/12/2021    [facility]- Ultrasound-guided cannulation left distal upper arm cephalic vein with 4 Swazi glide sheath, Left upper extremity fistulogram's including venography the superior vena cava    VASCULAR SURGERY  07/27/2021    [facility]- Superficialization of the left upper arm cephalic vein arteriovenous fistula       Family History  Family History   Problem Relation Age of Onset    Lung Cancer Mother     Brain Cancer Mother     Heart Attack Father     Prostate Cancer Father     Heart Disease Father     Stroke Father     Obesity Father     No Known Problems Brother     Uterine Cancer Maternal Grandmother     Cervical Cancer Maternal Grandmother     Diabetes Maternal Grandfather     Other Maternal Grandfather         histoplasmosis    Obesity Paternal Grandmother     Diabetes Paternal Grandfather     Kidney Disease Paternal Grandfather     Other Daughter         Malgorzata Fisher, Symone Hugo Tabes syndrome       Social History  Social History     Socioeconomic History    Marital status:      Spouse name: Mr. 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 Leonard Santacruz, Her , [phone]    AMBULATES: uses a wheel chair when out, walks at home independently    DOMICILED: has stairs in her home to the [facility] and washer which she does not use     Social Determinants of Health     Financial Resource Strain: Not on file   Food Insecurity: Not on file   Transportation Needs: Not on file   Physical Activity: Not on file   Stress: Not on file   Social Connections: Not on file   Intimate Partner Violence: Not on file   Housing Stability: Not on file         Review of Systems:  History obtained from chart review and the patient  General ROS: No fever or chills  Respiratory ROS: No cough, shortness of breath, wheezing  Cardiovascular ROS: No chest pain or palpitations  Gastrointestinal ROS: No abdominal pain or melena  Genito-Urinary ROS: No dysuria or hematuria  Musculoskeletal ROS: No joint pain or swelling         Objective:  Patient Vitals for the past 24 hrs:   BP Temp Temp src Pulse Resp SpO2   10/24/22 0801 (!) 115/51 97.9 °F (36.6 °C) Oral 82 16 98 %   10/24/22 0515 (!) 120/53 97.7 °F (36.5 °C) Oral 86 16 96 %   10/24/22 0115 (!) 104/50 98.6 °F (37 °C) Oral 82 18 94 %   10/23/22 1758 120/63 -- Oral 89 18 97 %       Intake/Output Summary (Last 24 hours) at 10/24/2022 1126  Last data filed at 10/24/2022 0951  Gross per 24 hour   Intake 220 ml   Output --   Net 220 ml     General: awake/alert   Chest:  clear to auscultation bilaterally  CVS: regular rate and rhythm  Abdominal: soft, nontender, normal bowel sounds  Extremities: no cyanosis or edema  Skin: warm and dry without rash    Labs:  BMP:   Recent Labs     10/22/22  0859 10/23/22  0330 10/24/22  0328   * 136 136   K 4.0 4.0 3.7   CL 89* 92* 93*   CO2 24 30* 25   BUN 45* 22 37*   CREATININE 7.8* 5.2* 7.3*   CALCIUM 10.3* 10.6* 10.6*     CBC:   Recent Labs     10/22/22  0859 10/23/22  0330 10/24/22  0328   WBC 18.4* 13.2* 12.7*   HGB 9.0* 9.6* 8.2*   HCT 28.8* 31.1* 25.7*   .5* 114.3* 111.7*    252 221     LIVER PROFILE:   Recent Labs     10/21/22  2134 10/23/22  0330 10/24/22  0328   AST 15 13 11   ALT 10 8 7   BILITOT 0.4 0.3 <0.2   ALKPHOS 93 91 78     PT/INR: No results for input(s): PROTIME, INR in the last 72 hours. APTT: No results for input(s): APTT in the last 72 hours. BNP:  No results for input(s): BNP in the last 72 hours. Ionized Calcium:No results for input(s): IONCA in the last 72 hours. Magnesium:No results for input(s): MG in the last 72 hours. Phosphorus:No results for input(s): PHOS in the last 72 hours. HgbA1C: No results for input(s): LABA1C in the last 72 hours. Hepatic:   Recent Labs     10/21/22  2134 10/23/22  0330 10/24/22  0328   ALKPHOS 93 91 78   ALT 10 8 7   AST 15 13 11   PROT 8.3 7.2 6.3*   BILITOT 0.4 0.3 <0.2   LABALBU 4.4 4.4 4.1     Lactic Acid:   Recent Labs     10/24/22  0328   LACTA 1.3     Troponin: No results for input(s): CKTOTAL, CKMB, TROPONINT in the last 72 hours. ABGs: No results found for: PHART, PO2ART, [id number]  CRP:  No results for input(s): CRP in the last 72 hours. Sed Rate:  No results for input(s): SEDRATE in the last 72 hours.     Culture Results:   Blood Culture Recent: Recent Labs     10/21/22  2253   BC No Growth to date. Any change in status will be called. Urine Culture Recent : No results for input(s): LABURIN in the last 720 hours. Radiology reports as per the Radiologist  Radiology: CT ABDOMEN PELVIS WO CONTRAST Additional Contrast? None    Result Date: 10/21/2022  Patient: Allison Saucedo  Time Out: 23:25Exam(s): CT ABDOMEN + PELVIS Without Contrast EXAM:  CT Abdomen and Pelvis Without Intravenous ContrastCLINICAL HISTORY:   Reason for exam: abdominal pain. TECHNIQUE:  Axial computed tomography images of the abdomen and pelvis without intravenous contrast.COMPARISON:Comparison made to prior CT scan of abdomen pelvis from September 11, 2022. FINDINGS:  Lung bases:  Unremarkable. No mass. No consolidation. ABDOMEN:  Liver:  Unremarkable. Gallbladder and bile ducts: Status post cholecystectomy. No ductal dilation. Pancreas:  Unremarkable. No ductal dilation. Spleen:  Unremarkable. No splenomegaly. Adrenals:  Unremarkable. No mass. Kidneys and ureters: Advanced atrophy. No obstructing stones. No hydronephrosis. Stomach and bowel: There is thickening and edema of the distal transverse, descending, and sigmoid colon wall registered into the rectal wall with mild inflammation of the surrounding fat. Submucosal fat halo sign in the right colon. PELVIS:  Appendix:  No findings to suggest acute appendicitis. Bladder:  Unremarkable. No stones. Reproductive: Status post hysterectomy. ABDOMEN and PELVIS:  Intraperitoneal space:  Unremarkable. No free air. No significant fluid collection. Bones/joints:  Remote superior endplate fracture of the L2 vertebral body. No acute fracture. No dislocation. Soft tissues:  Unremarkable. Vasculature: IVC filter in place. No abdominal aortic aneurysm. Lymph nodes:  Unremarkable. No enlarged lymph nodes.     Findings concerning for colitis of the transverse, descending and sigmoid colon with proctitis, which may be of infectious or inflammatory etiologies. Electronically signed by Walter Ramirez MD on 10-21-22 at 2325    XR ACUTE ABD SERIES CHEST 1 VW    Result Date: 10/21/2022  NO PRIOR REPORT AVAILABLE Exam: ACUTE ABDOMINAL SERIES Clinical data: Constipation, abdominal discomfort. Technique: Two views of the abdomen with frontal view of the chest. Prior studies: CT scan of abdomen and pelvis dated 9/11/22. Radiograph of chest dated 9/23/22. Findings: Supine and upright views of the abdomen, as well as an AP chest. The lungs are grossly clear. Cardiac silhouette is within normal limits. Air in loops of nondistended small bowel and colon with a nonspecific, nonobstructive bowel gas pattern. No evidence of small bowel obstruction or intraperitoneal free air. Liver appears enlarged in size. No acute osseous abnormality is detected. Surgical sutures seen in right upper quadrant. Mild degenerative spine. Multiple phleboliths seen in pelvis. IVC filter at the L3-L4 level. 1. Nonspecific nonobstructive bowel gas pattern. Liver appears enlarged in size 2. No adverse interval change. Recommendation: Follow up as clinically indicated.  Electronically Signed by Ann Dc MD at 21-Oct-2022 10:56:30 PM EST                Assessment   End-stage renal disease  Diabetes type 2 with diabetic nephropathy  Anemia and chronic kidney disease  Acute bacterial colitis  Constipation  Morbid obesity  Hyperphosphatemia  Secondary hyperparathyroidism      Plan:  Discussed with patient, nursing  Work-up reviewed to date  Monitor labs  Continue phosphorus binders with Renvela per the hospital formulary  Bowel regimen and IV antibiotics per primary service    Nate Evans MD  10/24/22  11:26 AM

## 2022-10-24 NOTE — PROGRESS NOTES
[facility] Progress Note    Patient:  Santiago Phillips  YOB: 1960  Date of Service: 10/24/2022  MRN: [id number]   Acct: [de-identified]   Primary Care Physician: Christina Manrique MD  Advance Directive: Full Code  Admit Date: 10/21/2022       Hospital Day: 3      CHIEF COMPLAINT:     Chief Complaint   Patient presents with    Constipation     Constipated for 4 days. Taking stool softeners, miralax, and other laxatives, reports attempting manual disimpaction with no relief. 10/24/2022 9:01 AM  Subjective / Interval History:   10/24/2022  No acute changes or acute overnight event reported. Patient doing well. Endorses constipation. Sitting comfortably in bed no apparent acute distress. Denies any acute complaints or distress at this time. 10/23/2022  Patient seen and examined. Doing well. No new complaints. No acute changes or acute overnight event reported. Sitting comfortably in bed in no apparent acute distress. Denies any acute complaints or distress at this time. 10/22/2022  Patient seen and examined this AM.  Doing well. No new complaints. No acute changes or acute overnight event reported. Sitting comfortably in bed in no acute distress. Patient reported episode of bowel movement yesterday. Denies any acute complaints or distress at this time. Review of Systems:   Review of Systems  ROS: 14 point review of systems is negative except as specifically addressed above. ADULT DIET; Regular; 4 carb choices (60 gm/meal); Low Fat/Low Chol/High Fiber/2 gm Na; Low Sodium (2 gm); Low Potassium (Less than 3000 mg/day);  Low Phosphorus (Less than 1000 mg); 1000 ml    Intake/Output Summary (Last 24 hours) at 10/24/2022 0901  Last data filed at 10/23/2022 1828  Gross per 24 hour   Intake 100 ml   Output --   Net 100 ml         Medications:   sodium chloride 45 mL/hr (10/23/22 1635)     Current Facility-Administered Medications   Medication Dose Route Frequency Provider Last Rate Last Admin    sodium chloride flush 0.9 % injection 5-40 mL  5-40 mL IntraVENous 2 times per day Pedro Castrejon MD   10 mL at 10/23/22 0925    sodium chloride flush 0.9 % injection 5-40 mL  5-40 mL IntraVENous PRN Pedro Castrejon MD        0.9 % sodium chloride infusion   IntraVENous PRN Pedro Castrejon MD 45 mL/hr at 10/23/22 1635 45 mL/hr at 10/23/22 1635    ondansetron (ZOFRAN-ODT) disintegrating tablet 4 mg  4 mg Oral Q8H PRN Pedro Castrejon MD        Or    ond[facility]ron [facility]) injection 4 mg  4 mg IntraVENous Q6H PRN Pedro Castrejon MD        heparin (porcine) injection 5,000 Units  5,000 Units SubCUTAneous 3 times per day Pedro Castrejon MD   5,000 Units at 10/24/22 0525    acetaminophen (TYLENOL) tablet 650 mg  650 mg Oral Q6H PRN Pedro Castrejon MD   650 mg at 10/23/22 0018    Or    acetaminophen (TYLENOL) suppository 650 mg  650 mg Rectal Q6H PRN Pedro Castrejon MD        melatonin disintegrating tablet 5 mg  5 mg Oral Nightly PRN Pedro Castrejon MD   5 mg at 10/24/22 0122    polyethylene glycol (GLYCOLAX) packet 17 g  17 g Oral Daily PRN Pedro Castrejon MD   17 g at 10/23/22 0929    calcium carbonate (TUMS) chewable tablet 500 mg  500 mg Oral TID PRN Pedro Castrejon MD        aspirin chewable tablet 81 mg  81 mg Oral Daily Pedro Castrejon MD   81 mg at 10/23/22 8855    busPIRone (BUSPAR) tablet 20 mg  20 mg Oral BID Pedro Castrejon MD   20 mg at 10/23/22 2152    buPROPion [facility]) extended release tablet 150 mg  150 mg Oral BID Pedro Castrejon MD   150 mg at 10/23/22 2152    calcitRIOL (ROCALTROL) capsule 1.5 mcg  1.5 mcg Oral Daily Pedro Castrejon MD   1.5 mcg at 10/23/22 1395    allopurinol (ZYLOPRIM) tablet 100 mg  100 mg Oral BID Pedro Castrejon MD   100 mg at 10/23/22 2152    cetirizine (ZYRTEC) tablet 10 mg  10 mg Oral PRN Pedro Castrejon MD        citalopram (CELEXA) tablet 40 mg  40 mg Oral Daily Pedro Castrejon MD   40 mg at 10/23/22 0990    lamoTRIgine (LAMICTAL) tablet 100 mg  100 mg Oral Nightly Anna Fox MD   100 mg at 10/23/22 2152    levothyroxine (SYNTHROID) tablet 88 mcg  88 mcg Oral Daily Anna Fox MD   88 mcg at 10/24/22 0525    rosuvastatin (CRESTOR) tablet 20 mg  20 mg Oral Nightly Anna Fox MD   20 mg at 10/23/22 2152    sevelamer (RENVELA) tablet 800 mg  800 mg Oral TID  Anna Fox MD   800 mg at 10/23/22 1626    ascorbic acid (VITAMIN C) tablet 500 mg  500 mg Oral BID Anna Fox MD   500 mg at 10/23/22 2152    insulin glargine (LANTUS) injection vial 15 Units  15 Units SubCUTAneous Nightly Anna Fox MD        insulin glargine (LANTUS) injection vial 15 Units  15 Units SubCUTAneous Once Anna Fox MD        insulin lispro (HUMALOG) injection vial 0-16 Units  0-16 Units SubCUTAneous TID  Anna Fox MD        insulin lispro (HUMALOG) injection vial 0-4 Units  0-4 Units SubCUTAneous Nightly Anna Fox MD        glucose chewable tablet 16 g  4 tablet Oral PRN Anna Fox MD        glucagon (rDNA) injection 1 mg  1 mg IntraMUSCular PRN Anna Fox MD        insulin lispro (HUMALOG) injection vial 4 Units  4 Units SubCUTAneous TID  Dorian Reis MD        epoetin jose ramon-epbx (RETACRIT) injection 3,000 Units  3,000 Units SubCUTAneous Once per day on Mon Wed Fri Kevin Ferguson MD        ondansetron (ZOFRAN-ODT) disintegrating tablet 4 mg  4 mg Oral Once Tino Aponte MD        0.9 % sodium chloride bolus  250 mL IntraVENous Once Tino Aponte MD        ciprofloxacin (CIPRO) IVPB 400 mg  400 mg IntraVENous Q24H Anna Fox MD   Stopped at 10/24/22 0003    metronidazole (FLAGYL) 500 mg in 0.9% NaCl 100 mL IVPB premix  500 mg IntraVENous Q8H Anna Fox MD   Stopped at 10/24/22 0252    mineral oil enema 1 enema  1 enema Rectal Daily PRN Anna Fox MD             sodium chloride 45 mL/hr (10/23/22 1635)      sodium chloride flush  5-40 mL IntraVENous 2 times per day    heparin (porcine)  5,000 Units SubCUTAneous 3 times per day    aspirin  81 mg Oral Daily    busPIRone  20 mg Oral BID    buPROPion  150 mg Oral BID    calcitRIOL  1.5 mcg Oral Daily    allopurinol  100 mg Oral BID    citalopram  40 mg Oral Daily    lamoTRIgine  100 mg Oral Nightly    levothyroxine  88 mcg Oral Daily    rosuvastatin  20 mg Oral Nightly    sevelamer  800 mg Oral TID WC    vitamin C  500 mg Oral BID    insulin glargine  15 Units SubCUTAneous Nightly    insulin glargine  15 Units SubCUTAneous Once    insulin lispro  0-16 Units SubCUTAneous TID WC    insulin lispro  0-4 Units SubCUTAneous Nightly    insulin lispro  4 Units SubCUTAneous TID WC    epoetin jose ramon-epbx  3,000 Units SubCUTAneous Once per day on Mon Wed Fri    ondansetron  4 mg Oral Once    sodium chloride  250 mL IntraVENous Once    ciprofloxacin  400 mg IntraVENous Q24H    metroNIDAZOLE  500 mg IntraVENous Q8H     sodium chloride flush, sodium chloride, ondansetron **OR** ondansetron, acetaminophen **OR** acetaminophen, melatonin, polyethylene glycol, calcium carbonate, cetirizine, glucose, glucagon (rDNA), mineral oil  ADULT DIET; Regular; 4 carb choices (60 gm/meal); Low Fat/Low Chol/High Fiber/2 gm Na; Low Sodium (2 gm); Low Potassium (Less than 3000 mg/day);  Low Phosphorus (Less than 1000 mg); 1000 ml       Labs:   CBC with DIFF:  Recent Labs     10/22/22  0859 10/23/22  0330 10/24/22  0328   WBC 18.4* 13.2* 12.7*   RBC 2.56* 2.72* 2.30*   HGB 9.0* 9.6* 8.2*   HCT 28.8* 31.1* 25.7*   .5* 114.3* 111.7*   MCH 35.2* 35.3* 35.7*   MCHC 31.3* 30.9* 31.9*   RDW 16.1* 16.2* 15.9*    252 221   MPV 9.2* 9.2* 9.4   NEUTOPHILPCT 79.9* 71.7* 70.1*   LYMPHOPCT 12.9* 15.3* 16.7*   MONOPCT 3.6 6.2 6.5   EOSRELPCT 1.0 2.5 3.9   BASOPCT 0.4 0.5 0.5   NEUTROABS 14.7* 9.4* 8.9*   LYMPHSABS 2.4 2.0 2.1   MONOSABS 0.70 0.80 0.80   EOSABS 0.20 0.30 0.50   BASOSABS 0.10 0.10 0.10         CMP/BMP:  Recent Labs     10/21/22  2134 10/22/22  0859 10/23/22  0330 10/24/22  0328   * 131* 136 136   K 4.2 4.0 4.0 3.7   CL 88* 89* 92* 93*   CO2 23 24 30* 25   ANIONGAP 24* 18 14 18   GLUCOSE 185* 123* 136* 67*   BUN 39* 45* 22 37*   CREATININE 6.9* 7.8* 5.2* 7.3*   LABGLOM 6* 5* 9* 6*   CALCIUM 10.4* 10.3* 10.6* 10.6*   PROT 8.3  --  7.2 6.3*   LABALBU 4.4  --  4.4 4.1   BILITOT 0.4  --  0.3 <0.2   ALKPHOS 93  --  91 78   ALT 10  --  8 7   AST 15  --  13 11           CRP:  No results for input(s): CRP in the last 72 hours. Sed Rate:  No results for input(s): SEDRATE in the last 72 hours. HgBA1c:  No components found for: HGBA1C  FLP:    Lab Results   Component Value Date/Time    TRIG 282 01/09/2013 10:44 AM    HDL 35 01/09/2013 10:44 AM    LDLCALC ND 09/08/2011 02:00 PM    LDLDIRECT 81 01/09/2013 10:44 AM     TSH:    Lab Results   Component Value Date/Time    TSH 2.710 06/25/2022 02:23 PM     Troponin T: No results for input(s): TROPONINI in the last 72 hours. Pro-BNP: No results for input(s): BNP in the last 72 hours. INR: No results for input(s): INR in the last 72 hours. ABGs: No results found for: PHART, PO2ART, [id number]  UA:  Recent Labs     10/22/22  0023   COLORU YELLOW   PHUR 6.0   CLARITYU Clear   SPECGRAV >=1.030   LEUKOCYTESUR SMALL*   UROBILINOGEN 0.2   BILIRUBINUR Negative   BLOODU TRACE-LYSED*   GLUCOSEU NEGATIVE           Culture Results:    No results for input(s): CXSURG in the last 720 hours. Blood Culture Recent:   Recent Labs     10/21/22  2253   BC No Growth to date. Any change in status will be called. Recent Labs     10/21/22  2253 10/21/22  2313   BC No Growth to date. Any change in status will be called. --    BLOODCULT2  --  No Growth to date. Any change in status will be called. Cultures:   No results for input(s): CULTURE in the last 72 hours. Recent Labs     10/21/22  2253 10/21/22  2313   BC No Growth to date. Any change in status will be called. --    BLOODCULT2  --  No Growth to date. Any change in status will be called.        No results for input(s): CXSURG in the last 72 hours. No results for input(s): MG, PHOS in the last 72 hours. Recent Labs     10/21/22  2134 10/23/22  0330 10/24/22  0328   AST 15 13 11   ALT 10 8 7   BILITOT 0.4 0.3 <0.2   ALKPHOS 93 91 78           RAD:   CT ABDOMEN PELVIS WO CONTRAST Additional Contrast? None    Result Date: 10/21/2022  Patient: Noris Ferrari  Time Out: 23:25Exam(s): CT ABDOMEN + PELVIS Without Contrast EXAM:  CT Abdomen and Pelvis Without Intravenous ContrastCLINICAL HISTORY:   Reason for exam: abdominal pain. TECHNIQUE:  Axial computed tomography images of the abdomen and pelvis without intravenous contrast.COMPARISON:Comparison made to prior CT scan of abdomen pelvis from September 11, 2022. FINDINGS:  Lung bases:  Unremarkable. No mass. No consolidation. ABDOMEN:  Liver:  Unremarkable. Gallbladder and bile ducts: Status post cholecystectomy. No ductal dilation. Pancreas:  Unremarkable. No ductal dilation. Spleen:  Unremarkable. No splenomegaly. Adrenals:  Unremarkable. No mass. Kidneys and ureters: Advanced atrophy. No obstructing stones. No hydronephrosis. Stomach and bowel: There is thickening and edema of the distal transverse, descending, and sigmoid colon wall registered into the rectal wall with mild inflammation of the surrounding fat. Submucosal fat halo sign in the right colon. PELVIS:  Appendix:  No findings to suggest acute appendicitis. Bladder:  Unremarkable. No stones. Reproductive: Status post hysterectomy. ABDOMEN and PELVIS:  Intraperitoneal space:  Unremarkable. No free air. No significant fluid collection. Bones/joints:  Remote superior endplate fracture of the L2 vertebral body. No acute fracture. No dislocation. Soft tissues:  Unremarkable. Vasculature: IVC filter in place. No abdominal aortic aneurysm. Lymph nodes:  Unremarkable. No enlarged lymph nodes.     Findings concerning for colitis of the transverse, descending and sigmoid colon with proctitis, which may be of infectious or inflammatory etiologies. Electronically signed by Irina Crum MD on 10-21-22 at 2325    XR CHEST (2 VW)    Result Date: 9/24/2022  EXAMINATION: Radiography of the Chest CLINICAL INDICATION: Surgical clearance COMPARISON: Prior study from 09/01/2021 is available. FINDINGS: Lungs/Pleura: Lungs are clear. No effusion. No pneumothorax. There is a 4 mm granuloma seen in the right upper lung. Mediastinum and Guillermina: Unremarkable. Heart and Vessels: Normal sized heart. Bones: No acute osseous abnormality. There is osteoarthritic changes of thoracic spine with calcification of the anterior longitudinal ligament. Lines/Tubes/Hardware: None     1. No acute process in the chest.    XR ACUTE ABD SERIES CHEST 1 VW    Result Date: 10/21/2022  NO PRIOR REPORT AVAILABLE Exam: ACUTE ABDOMINAL SERIES Clinical data: Constipation, abdominal discomfort. Technique: Two views of the abdomen with frontal view of the chest. Prior studies: CT scan of abdomen and pelvis dated 9/11/22. Radiograph of chest dated 9/23/22. Findings: Supine and upright views of the abdomen, as well as an AP chest. The lungs are grossly clear. Cardiac silhouette is within normal limits. Air in loops of nondistended small bowel and colon with a nonspecific, nonobstructive bowel gas pattern. No evidence of small bowel obstruction or intraperitoneal free air. Liver appears enlarged in size. No acute osseous abnormality is detected. Surgical sutures seen in right upper quadrant. Mild degenerative spine. Multiple phleboliths seen in pelvis. IVC filter at the L3-L4 level. 1. Nonspecific nonobstructive bowel gas pattern. Liver appears enlarged in size 2. No adverse interval change. Recommendation: Follow up as clinically indicated.  Electronically Signed by Daniel Cueva MD at 21-Oct-2022 10:56:30 PM EST                 Objective:   Vitals:   BP (!) 115/51   Pulse 82   Temp 97.9 °F (36.6 °C) (Oral)   Resp 16   Ht 5' 5\" (1.651 m) Wt (!) 315 lb 4.1 oz (143 kg)   LMP 01/01/2000   SpO2 98%   BMI 52.46 kg/m²     Patient Vitals for the past 24 hrs:   BP Temp Temp src Pulse Resp SpO2   10/24/22 0801 (!) 115/51 97.9 °F (36.6 °C) Oral 82 16 98 %   10/24/22 0515 (!) 120/53 97.7 °F (36.5 °C) Oral 86 16 96 %   10/24/22 0115 (!) 104/50 98.6 °F (37 °C) Oral 82 18 94 %   10/23/22 1758 120/63 -- Oral 89 18 97 %   10/23/22 1117 (!) 123/56 97.3 °F (36.3 °C) Oral 86 16 100 %         24HR INTAKE/OUTPUT:    Intake/Output Summary (Last 24 hours) at 10/24/2022 0901  Last data filed at 10/23/2022 1828  Gross per 24 hour   Intake 100 ml   Output --   Net 100 ml         Physical Exam  Vitals and nursing note reviewed. Constitutional:       General: She is not in acute distress. Appearance: Normal appearance. She is obese. She is not ill-appearing, toxic-appearing or diaphoretic. HENT:      Head: Normocephalic and atraumatic. Right Ear: External ear normal.      Left Ear: External ear normal.      Nose: Nose normal. No congestion or rhinorrhea. Mouth/Throat:      Mouth: Mucous membranes are moist.      Pharynx: Oropharynx is clear. No oropharyngeal exudate or posterior oropharyngeal erythema. Eyes:      General: No scleral icterus. Right eye: No discharge. Left eye: No discharge. Extraocular Movements: Extraocular movements intact. Conjunctiva/sclera: Conjunctivae normal.      Pupils: Pupils are equal, round, and reactive to light. Cardiovascular:      Rate and Rhythm: Normal rate and regular rhythm. Pulses: Normal pulses. Heart sounds: Normal heart sounds. No murmur heard. No friction rub. No gallop. Pulmonary:      Effort: Pulmonary effort is normal. No respiratory distress. Breath sounds: Normal breath sounds. No stridor. No wheezing, rhonchi or rales. Chest:      Chest wall: No tenderness. Abdominal:      General: Bowel sounds are normal. There is no distension.       Palpations: Abdomen is soft.      Tenderness: There is abdominal tenderness (Diffuse tenderness to palpation. No guarding, rigidity or rebound tenderness noted. ). There is no guarding or rebound. Musculoskeletal:         General: No swelling, tenderness, deformity or signs of injury. Normal range of motion. Cervical back: Normal range of motion and neck supple. No rigidity. No muscular tenderness. Right lower leg: No edema. Left lower leg: No edema. Skin:     General: Skin is warm and dry. Capillary Refill: Capillary refill takes less than 2 seconds. Coloration: Skin is not jaundiced or pale. Findings: No bruising, erythema, lesion or rash. Neurological:      General: No focal deficit present. Mental Status: She is alert and oriented to person, place, and time. Cranial Nerves: No cranial nerve deficit. Sensory: No sensory deficit. Motor: No weakness. Coordination: Coordination normal.   Psychiatric:         Mood and Affect: Mood normal.         Behavior: Behavior normal.         Thought Content: Thought content normal.         Judgment: Judgment normal.       Assessment/plan:     Patient Active Problem List    Diagnosis Date Noted    Colitis 10/22/2022     Priority: High    Sepsis ([facility] [address] 10/21/2022     Priority: Medium    Pyelonephritis 06/25/2022     Priority: Medium    ESRD on dialysis ([address] 07/24/2018     Priority: Medium    Hypoxia     Acute hemodialysis encounter ([address] 08/10/2021    Acute hypoxemic respiratory failure ([address] 08/10/2021    Diabetes ([facility] [address] 08/10/2021    Hypertension 08/10/2021    Allergic rhinitis with postnasal drip 04/06/2021    Deep vein thrombosis of lower extremity ([facility] [address] 10/09/2019    Disease of liver 10/09/2019    Thyroid disease 02/26/2017    Sleep apnea 02/26/2017     Last Assessment & Plan:   Patient states that she currently uses a CPAP at night for obstructive sleep apnea.   She states that she has noticed that she has had more drainage and sinus pressure causing throat and uvula swelling. She states that she feels like she is gagging or choking at times while she is trying to sleep. She is concerned and wishes to be evaluated for this. I will refer to ENT at this time. I will also treat for a possible sinus infection, unable to do steroids due to her being an insulin-dependent diabetic. Mood disorder ([address] 02/26/2017    Type 2 DM with CKD stage 3 and hypertension ([address] 02/26/2017    Hypertriglyceridemia 02/26/2017    Anxiety 02/26/2017    Vitamin D deficiency 02/26/2017    Anemia 02/26/2017    Hypocalcemia 02/26/2017    Anemia of chronic renal failure 12/12/2016    Hypertension associated with diabetes ([address] 10/17/2016    B12 deficiency 10/17/2016    Mixed diabetic hyperlipidemia associated with type 2 diabetes mellitus ([address] 10/17/2016    Morbid obesity ([address] 09/15/2011    Embolism ([address] 01/01/2004     Updating deleted diagnoses         Hospital Problems             Last Modified POA    * (Principal) Sepsis ([address] 10/21/2022 Yes    Colitis 10/22/2022 Yes    ESRD on dialysis ([address] 10/22/2022 Yes    Morbid obesity ([address] 10/22/2022 Yes    Sleep apnea 10/22/2022 Yes    Overview Signed 5/3/2021  9:45 AM by Benjamin Dueñas MA     Last Assessment & Plan:   Patient states that she currently uses a CPAP at night for obstructive sleep apnea. She states that she has noticed that she has had more drainage and sinus pressure causing throat and uvula swelling. She states that she feels like she is gagging or choking at times while she is trying to sleep. She is concerned and wishes to be evaluated for this. I will refer to ENT at this time. I will also treat for a possible sinus infection, unable to do steroids due to her being an insulin-dependent diabetic.          Type 2 DM with CKD stage 3 and hypertension ([address] 10/22/2022 Yes    Hypertriglyceridemia 10/22/2022 Yes    Deep vein thrombosis of lower extremity ([address] 10/22/2022 Yes    Hypertension Phosphorus (Less than 1000 mg); 1000 ml         Consults Made:   IP CONSULT TO NEPHROLOGY  IP CONSULT TO GI      DVT prophylaxis: Heparin        Discharge planning:   Continue management as noted above    Time Spent is  25 mins  in the examination, evaluation, counseling and review of medications, assessment and plan.      Electronically signed by   Ayaka Urbina MD, MPH, MD,   Internal Medicine Hospitalist   10/24/2022 9:01 AM

## 2022-10-24 NOTE — CARE COORDINATION
Patient Contact Information:    [place]  (home)   Telephone Information:   Mobile [phone]     Above information verified? [x]   Yes  []   No      Emergency Contacts:    Extended Emergency Contact Information  Primary Emergency Contact: Olga Lidia Moreno  Address: [address] [place] [address] Phone: [phone]  Mobile Phone: [phone]  Relation: Spouse      Have you been vaccinated for COVID-19 (SARS-CoV-2)? [x]   Yes  []   No                   If so, when?     Which :         []   Pfizer-BioNTech  []   Moderna  [x]   Donnamarie   []   Other:         Pharmacy:    [facility], [place] [address] [address] - [address] [facility] [phone]  [address] [place] [address] [address]  [address] [place] [address]  Phone: [phone] Fax: [phone]          Patient Deficits:    []   Yes   [x]   No    If yes:    []   Confusion/Memory  []   Visual  []   Motor/Sensory         []   Right arm         []   Right leg         []   Left arm         []   Left leg  []   Language/Speech         []   Aphasia         []   Dysarthria         []   Swallow         [place] Coma Scale  Eye Opening: Spontaneous  Best Verbal Response: Oriented  Best Motor Response: Obeys commands  [place] Coma Scale Score: 15    Patient Deficit Notes:        visited with Pt and family, at bedside, re: d/c planning; lives at home with spouse and their daughter; no current [place]; cpap at home; and has w/c rollator; needs help with bathing but stated can manage other ADL needs; they all work together to accomplish IADL needs,    Electronically signed by Niraj Nicholas [facility] on 10/24/2022 at 4:46 PM

## 2022-10-24 NOTE — PLAN OF CARE
Problem: ABCDS Injury Assessment  Goal: Absence of physical injury  10/24/2022 1257 by Josselin Ahuja RN  Outcome: Progressing  10/24/2022 0443 by Claudia Palmer RN  Outcome: Progressing     Problem: Discharge Planning  Goal: Discharge to home or other facility with appropriate resources  10/24/2022 1257 by Josselin Ahuja RN  Outcome: Progressing  10/24/2022 0443 by Claudia Palmer RN  Outcome: Progressing  Flowsheets (Taken 10/23/2022 2324)  Discharge to home or other facility with appropriate resources: Identify barriers to discharge with patient and caregiver     Problem: Pain  Goal: Verbalizes/displays adequate comfort level or baseline comfort level  10/24/2022 1257 by Josselin Ahuja RN  Outcome: Progressing  10/24/2022 0443 by Claudia Palmer RN  Outcome: Progressing

## 2022-10-24 NOTE — PLAN OF CARE
Problem: ABCDS Injury Assessment  Goal: Absence of physical injury  Outcome: Progressing     Problem: Discharge Planning  Goal: Discharge to home or other facility with appropriate resources  Outcome: Progressing  Flowsheets (Taken 10/23/2022 1008)  Discharge to home or other facility with appropriate resources: Identify barriers to discharge with patient and caregiver     Problem: Pain  Goal: Verbalizes/displays adequate comfort level or baseline comfort level  Outcome: Progressing

## 2022-10-25 LAB
ANION GAP SERPL CALCULATED.3IONS-SCNC: 19 MMOL/L (ref 7–19)
BASOPHILS ABSOLUTE: 0.1 K/UL (ref 0–0.2)
BASOPHILS RELATIVE PERCENT: 0.4 % (ref 0–1)
BUN BLDV-MCNC: 44 MG/DL (ref 8–23)
CALCIUM SERPL-MCNC: 10.9 MG/DL (ref 8.8–10.2)
CHLORIDE BLD-SCNC: 89 MMOL/L (ref 98–111)
CO2: 24 MMOL/L (ref 22–29)
CREAT SERPL-MCNC: 8.6 MG/DL (ref 0.5–0.9)
EOSINOPHILS ABSOLUTE: 0.4 K/UL (ref 0–0.6)
EOSINOPHILS RELATIVE PERCENT: 2.7 % (ref 0–5)
GFR SERPL CREATININE-BSD FRML MDRD: 5 ML/MIN/{1.73_M2}
GLUCOSE BLD-MCNC: 127 MG/DL (ref 74–109)
GLUCOSE BLD-MCNC: 135 MG/DL (ref 70–99)
GLUCOSE BLD-MCNC: 240 MG/DL (ref 70–99)
GLUCOSE BLD-MCNC: 99 MG/DL (ref 70–99)
HCT VFR BLD CALC: 24.8 % (ref 37–47)
HEMOGLOBIN: 8.2 G/DL (ref 12–16)
IMMATURE GRANULOCYTES #: 0.3 K/UL
LYMPHOCYTES ABSOLUTE: 2.2 K/UL (ref 1.1–4.5)
LYMPHOCYTES RELATIVE PERCENT: 16.1 % (ref 20–40)
MCH RBC QN AUTO: 36.3 PG (ref 27–31)
MCHC RBC AUTO-ENTMCNC: 33.1 G/DL (ref 33–37)
MCV RBC AUTO: 109.7 FL (ref 81–99)
MONOCYTES ABSOLUTE: 0.7 K/UL (ref 0–0.9)
MONOCYTES RELATIVE PERCENT: 5.1 % (ref 0–10)
NEUTROPHILS ABSOLUTE: 9.9 K/UL (ref 1.5–7.5)
NEUTROPHILS RELATIVE PERCENT: 73.6 % (ref 50–65)
PARATHYROID HORMONE INTACT: 75.3 PG/ML (ref 15–65)
PDW BLD-RTO: 15.7 % (ref 11.5–14.5)
PERFORMED ON: ABNORMAL
PERFORMED ON: ABNORMAL
PERFORMED ON: NORMAL
PLATELET # BLD: 232 K/UL (ref 130–400)
PMV BLD AUTO: 9.7 FL (ref 9.4–12.3)
POTASSIUM REFLEX MAGNESIUM: 3.7 MMOL/L (ref 3.5–5)
RBC # BLD: 2.26 M/UL (ref 4.2–5.4)
SODIUM BLD-SCNC: 132 MMOL/L (ref 136–145)
TROPONIN: 0.01 NG/ML (ref 0–0.03)
VITAMIN D 25-HYDROXY: 56.3 NG/ML
WBC # BLD: 13.5 K/UL (ref 4.8–10.8)

## 2022-10-25 PROCEDURE — 2500000003 HC RX 250 WO HCPCS: Performed by: HOSPITALIST

## 2022-10-25 PROCEDURE — 82947 ASSAY GLUCOSE BLOOD QUANT: CPT

## 2022-10-25 PROCEDURE — 82306 VITAMIN D 25 HYDROXY: CPT

## 2022-10-25 PROCEDURE — 1210000000 HC MED SURG R&B

## 2022-10-25 PROCEDURE — 93005 ELECTROCARDIOGRAM TRACING: CPT | Performed by: HOSPITALIST

## 2022-10-25 PROCEDURE — 85025 COMPLETE CBC W/AUTO DIFF WBC: CPT

## 2022-10-25 PROCEDURE — 2580000003 HC RX 258: Performed by: HOSPITALIST

## 2022-10-25 PROCEDURE — 8010000000 HC HEMODIALYSIS ACUTE INPT

## 2022-10-25 PROCEDURE — 83970 ASSAY OF PARATHORMONE: CPT

## 2022-10-25 PROCEDURE — 6370000000 HC RX 637 (ALT 250 FOR IP): Performed by: HOSPITALIST

## 2022-10-25 PROCEDURE — 36415 COLL VENOUS BLD VENIPUNCTURE: CPT

## 2022-10-25 PROCEDURE — 84484 ASSAY OF TROPONIN QUANT: CPT

## 2022-10-25 PROCEDURE — 6360000002 HC RX W HCPCS: Performed by: HOSPITALIST

## 2022-10-25 PROCEDURE — 80048 BASIC METABOLIC PNL TOTAL CA: CPT

## 2022-10-25 RX ADMIN — Medication 5 MG: at 22:49

## 2022-10-25 RX ADMIN — METRONIDAZOLE 500 MG: 500 INJECTION, SOLUTION INTRAVENOUS at 22:52

## 2022-10-25 RX ADMIN — LEVOTHYROXINE SODIUM 88 MCG: 0.09 TABLET ORAL at 05:38

## 2022-10-25 RX ADMIN — HEPARIN SODIUM 5000 UNITS: 5000 INJECTION INTRAVENOUS; SUBCUTANEOUS at 13:22

## 2022-10-25 RX ADMIN — ALLOPURINOL 100 MG: 100 TABLET ORAL at 13:22

## 2022-10-25 RX ADMIN — ASPIRIN 81 MG 81 MG: 81 TABLET ORAL at 13:21

## 2022-10-25 RX ADMIN — LAMOTRIGINE 100 MG: 100 TABLET ORAL at 22:50

## 2022-10-25 RX ADMIN — ALLOPURINOL 100 MG: 100 TABLET ORAL at 22:50

## 2022-10-25 RX ADMIN — Medication 5 MG: at 01:06

## 2022-10-25 RX ADMIN — BUSPIRONE HYDROCHLORIDE 20 MG: 10 TABLET ORAL at 13:21

## 2022-10-25 RX ADMIN — BUSPIRONE HYDROCHLORIDE 20 MG: 10 TABLET ORAL at 22:50

## 2022-10-25 RX ADMIN — HEPARIN SODIUM 5000 UNITS: 5000 INJECTION INTRAVENOUS; SUBCUTANEOUS at 22:49

## 2022-10-25 RX ADMIN — SEVELAMER CARBONATE 800 MG: 800 TABLET, FILM COATED ORAL at 17:46

## 2022-10-25 RX ADMIN — CALCITRIOL CAPSULES 0.25 MCG 1.5 MCG: 0.25 CAPSULE ORAL at 13:21

## 2022-10-25 RX ADMIN — BUPROPION HYDROCHLORIDE 150 MG: 150 TABLET, EXTENDED RELEASE ORAL at 22:55

## 2022-10-25 RX ADMIN — Medication 10 ML: at 09:25

## 2022-10-25 RX ADMIN — OXYCODONE HYDROCHLORIDE AND ACETAMINOPHEN 500 MG: 500 TABLET ORAL at 13:22

## 2022-10-25 RX ADMIN — BUPROPION HYDROCHLORIDE 150 MG: 150 TABLET, EXTENDED RELEASE ORAL at 13:21

## 2022-10-25 RX ADMIN — CIPROFLOXACIN 400 MG: 2 INJECTION, SOLUTION INTRAVENOUS at 01:08

## 2022-10-25 RX ADMIN — ROSUVASTATIN CALCIUM 20 MG: 20 TABLET, COATED ORAL at 22:49

## 2022-10-25 RX ADMIN — OXYCODONE HYDROCHLORIDE AND ACETAMINOPHEN 500 MG: 500 TABLET ORAL at 22:50

## 2022-10-25 RX ADMIN — METRONIDAZOLE 500 MG: 500 INJECTION, SOLUTION INTRAVENOUS at 13:29

## 2022-10-25 RX ADMIN — HEPARIN SODIUM 5000 UNITS: 5000 INJECTION INTRAVENOUS; SUBCUTANEOUS at 05:38

## 2022-10-25 RX ADMIN — CITALOPRAM HYDROBROMIDE 40 MG: 20 TABLET ORAL at 13:22

## 2022-10-25 RX ADMIN — METRONIDAZOLE 500 MG: 500 INJECTION, SOLUTION INTRAVENOUS at 02:13

## 2022-10-25 NOTE — PROGRESS NOTES
[place] [facility] Progress Note    Patient:  Nancy Ramirez  YOB: 1960  Date of Service: 10/25/2022  MRN: [id number]   Acct: [de-identified]   Primary Care Physician: Saurabh Brown MD  Advance Directive: Full Code  Admit Date: 10/21/2022       Hospital Day: 4      CHIEF COMPLAINT:     Chief Complaint   Patient presents with    Constipation     Constipated for 4 days. Taking stool softeners, miralax, and other laxatives, reports attempting manual disimpaction with no relief. 10/25/2022 9:13 AM  Subjective / Interval History:   10/25/2022  Patient seen and examined this AM.  Doing well. Reports 3 episodes of loose/watery bowel movement yesterday. Laying comfortably in bed no acute distress. Denies any acute complaints or distress at this time. 10/24/2022  No acute changes or acute overnight event reported. Patient doing well. Endorses constipation. Sitting comfortably in bed no apparent acute distress. Denies any acute complaints or distress at this time. 10/23/2022  Patient seen and examined. Doing well. No new complaints. No acute changes or acute overnight event reported. Sitting comfortably in bed in no apparent acute distress. Denies any acute complaints or distress at this time. 10/22/2022  Patient seen and examined this AM.  Doing well. No new complaints. No acute changes or acute overnight event reported. Sitting comfortably in bed in no acute distress. Patient reported episode of bowel movement yesterday. Denies any acute complaints or distress at this time. Review of Systems:   Review of Systems  ROS: 14 point review of systems is negative except as specifically addressed above. ADULT DIET; Regular; 4 carb choices (60 gm/meal); Low Fat/Low Chol/High Fiber/2 gm Na; Low Sodium (2 gm); Low Potassium (Less than 3000 mg/day);  Low Phosphorus (Less than 1000 mg); 1000 ml    Intake/Output Summary (Last 24 hours) at 10/25/2022 0913  Last data filed at 10/24/2022 1449  Gross per 24 hour   Intake 240 ml   Output --   Net 240 ml         Medications:   sodium chloride 45 mL/hr (10/23/22 1635)     Current Facility-Administered Medications   Medication Dose Route Frequency Provider Last Rate Last Admin    sodium chloride flush 0.9 % injection 5-40 mL  5-40 mL IntraVENous 2 times per day Essie Aguirre MD   10 mL at 10/24/22 1013    sodium chloride flush 0.9 % injection 5-40 mL  5-40 mL IntraVENous PRN Essie Aguirre MD        0.9 % sodium chloride infusion   IntraVENous PRN Essie Aguirre MD 45 mL/hr at 10/23/22 1635 45 mL/hr at 10/23/22 1635    ondansetron (ZOFRAN-ODT) disintegrating tablet 4 mg  4 mg Oral Q8H PRN Essie Aguirre MD        Or    ondansetron [facility]) injection 4 mg  4 mg IntraVENous Q6H PRN Essie Aguirre MD        heparin (porcine) injection 5,000 Units  5,000 Units SubCUTAneous 3 times per day Essie Aguirre MD   5,000 Units at 10/25/22 0538    acetaminophen (TYLENOL) tablet 650 mg  650 mg Oral Q6H PRN Essie Aguirre MD   650 mg at 10/23/22 0018    Or    acetaminophen (TYLENOL) suppository 650 mg  650 mg Rectal Q6H PRN Essie Aguirre MD        melatonin disintegrating tablet 5 mg  5 mg Oral Nightly PRN Essie Aguirre MD   5 mg at 10/25/22 0106    polyethylene glycol (GLYCOLAX) packet 17 g  17 g Oral Daily PRN Essie Aguirre MD   17 g at 10/24/22 1147    calcium carbonate (TUMS) chewable tablet 500 mg  500 mg Oral TID PRN Essie Aguirre MD        aspirin chewable tablet 81 mg  81 mg Oral Daily Essie Aguirre MD   81 mg at 10/24/22 1009    busPIRone (BUSPAR) tablet 20 mg  20 mg Oral BID Essie Aguirre MD   20 mg at 10/24/22 2041    buPROPion [facility]) extended release tablet 150 mg  150 mg Oral BID Essie Aguirre MD   150 mg at 10/24/22 2042    calcitRIOL (ROCALTROL) capsule 1.5 mcg  1.5 mcg Oral Daily Essie Aguirre MD   1.5 mcg at 10/24/22 1010    allopurinol (ZYLOPRIM) tablet 100 mg  100 mg Oral BID Essie Aguirre MD   100 mg at 10/24/22 2042 cetirizine (ZYRTEC) tablet 10 mg  10 mg Oral PRN Lorenza López MD        citalopram (CELEXA) tablet 40 mg  40 mg Oral Daily Lorenza López MD   40 mg at 10/24/22 1010    lamoTRIgine (LAMICTAL) tablet 100 mg  100 mg Oral Nightly Lorenza López MD   100 mg at 10/24/22 2041    levothyroxine (SYNTHROID) tablet 88 mcg  88 mcg Oral Daily Lorenza López MD   88 mcg at 10/25/22 0538    rosuvastatin (CRESTOR) tablet 20 mg  20 mg Oral Nightly Lorenza López MD   20 mg at 10/24/22 2042    sevelamer (RENVELA) tablet 800 mg  800 mg Oral TID  Lorenza López MD   800 mg at 10/24/22 1659    ascorbic acid (VITAMIN C) tablet 500 mg  500 mg Oral BID Lorenza López MD   500 mg at 10/24/22 2041    insulin glargine (LANTUS) injection vial 15 Units  15 Units SubCUTAneous Nightly Lorenza López MD        insulin glargine (LANTUS) injection vial 15 Units  15 Units SubCUTAneous Once Lorenza López MD        insulin lispro (HUMALOG) injection vial 0-16 Units  0-16 Units SubCUTAneous TID  Lorenza López MD        insulin lispro (HUMALOG) injection vial 0-4 Units  0-4 Units SubCUTAneous Nightly Lorenza López MD        glucose chewable tablet 16 g  4 tablet Oral PRN Lorenza López MD        glucagon (rDNA) injection 1 mg  1 mg IntraMUSCular PRN Lorenza López MD        insulin lispro (HUMALOG) injection vial 4 Units  4 Units SubCUTAneous TID  Rosmery Estrada MD        epoetin jose ramon-epbx (RETACRIT) injection 3,000 Units  3,000 Units SubCUTAneous Once per day on Mon Wed Fri Yolette Goldsmith MD   3,000 Units at 10/24/22 1115    ondansetron (ZOFRAN-ODT) disintegrating tablet 4 mg  4 mg Oral Once Regino Huang MD        0.9 % sodium chloride bolus  250 mL IntraVENous Once Regino Huang MD        ciprofloxacin (CIPRO) IVPB 400 mg  400 mg IntraVENous Q24H Lorenza López MD   Stopped at 10/25/22 0243    metronidazole (FLAGYL) 500 mg in 0.9% NaCl 100 mL IVPB premix  500 mg IntraVENous Q8H Lorenza López MD   Stopped at 10/25/22 2521 mineral oil enema 1 enema  1 enema Rectal Daily PRN Yamnii Talbert MD             sodium chloride 45 mL/hr (10/23/22 1635)      sodium chloride flush  5-40 mL IntraVENous 2 times per day    heparin (porcine)  5,000 Units SubCUTAneous 3 times per day    aspirin  81 mg Oral Daily    busPIRone  20 mg Oral BID    buPROPion  150 mg Oral BID    calcitRIOL  1.5 mcg Oral Daily    allopurinol  100 mg Oral BID    citalopram  40 mg Oral Daily    lamoTRIgine  100 mg Oral Nightly    levothyroxine  88 mcg Oral Daily    rosuvastatin  20 mg Oral Nightly    sevelamer  800 mg Oral TID WC    vitamin C  500 mg Oral BID    insulin glargine  15 Units SubCUTAneous Nightly    insulin glargine  15 Units SubCUTAneous Once    insulin lispro  0-16 Units SubCUTAneous TID WC    insulin lispro  0-4 Units SubCUTAneous Nightly    insulin lispro  4 Units SubCUTAneous TID WC    epoetin jose ramon-epbx  3,000 Units SubCUTAneous Once per day on Mon Wed Fri    ondansetron  4 mg Oral Once    sodium chloride  250 mL IntraVENous Once    ciprofloxacin  400 mg IntraVENous Q24H    metroNIDAZOLE  500 mg IntraVENous Q8H     sodium chloride flush, sodium chloride, ondansetron **OR** ondansetron, acetaminophen **OR** acetaminophen, melatonin, polyethylene glycol, calcium carbonate, cetirizine, glucose, glucagon (rDNA), mineral oil  ADULT DIET; Regular; 4 carb choices (60 gm/meal); Low Fat/Low Chol/High Fiber/2 gm Na; Low Sodium (2 gm); Low Potassium (Less than 3000 mg/day);  Low Phosphorus (Less than 1000 mg); 1000 ml       Labs:   CBC with DIFF:  Recent Labs     10/23/22  0330 10/24/22  0328 10/25/22  0327   WBC 13.2* 12.7* 13.5*   RBC 2.72* 2.30* 2.26*   HGB 9.6* 8.2* 8.2*   HCT 31.1* 25.7* 24.8*   .3* 111.7* 109.7*   MCH 35.3* 35.7* 36.3*   MCHC 30.9* 31.9* 33.1   RDW 16.2* 15.9* 15.7*    221 232   MPV 9.2* 9.4 9.7   NEUTOPHILPCT 71.7* 70.1* 73.6*   LYMPHOPCT 15.3* 16.7* 16.1*   MONOPCT 6.2 6.5 5.1   EOSRELPCT 2.5 3.9 2.7   BASOPCT 0.5 0.5 0.4 NEUTROABS 9.4* 8.9* 9.9*   LYMPHSABS 2.0 2.1 2.2   MONOSABS 0.80 0.80 0.70   EOSABS 0.30 0.50 0.40   BASOSABS 0.10 0.10 0.10         CMP/BMP:  Recent Labs     10/23/22  0330 10/24/22  0328 10/25/22  0327    136 132*   K 4.0 3.7 3.7   CL 92* 93* 89*   CO2 30* 25 24   ANIONGAP 14 18 19   GLUCOSE 136* 67* 127*   BUN 22 37* 44*   CREATININE 5.2* 7.3* 8.6*   LABGLOM 9* 6* 5*   CALCIUM 10.6* 10.6* 10.9*   PROT 7.2 6.3*  --    LABALBU 4.4 4.1  --    BILITOT 0.3 <0.2  --    ALKPHOS 91 78  --    ALT 8 7  --    AST 13 11  --            CRP:  No results for input(s): CRP in the last 72 hours. Sed Rate:  No results for input(s): SEDRATE in the last 72 hours. HgBA1c:  No components found for: HGBA1C  FLP:    Lab Results   Component Value Date/Time    TRIG 282 01/09/2013 10:44 AM    HDL 35 01/09/2013 10:44 AM    LDLCALC ND 09/08/2011 02:00 PM    LDLDIRECT 81 01/09/2013 10:44 AM     TSH:    Lab Results   Component Value Date/Time    TSH 2.710 06/25/2022 02:23 PM     Troponin T:   Recent Labs     10/25/22  0327   TROPONINI 0.01     Pro-BNP: No results for input(s): BNP in the last 72 hours. INR: No results for input(s): INR in the last 72 hours. ABGs: No results found for: PHART, PO2ART, BMB6JBH  UA:  No results for input(s): NITRITE, COLORU, PHUR, LABCAST, WBCUA, RBCUA, MUCUS, TRICHOMONAS, YEAST, BACTERIA, CLARITYU, SPECGRAV, LEUKOCYTESUR, UROBILINOGEN, BILIRUBINUR, BLOODU, GLUCOSEU, AMORPHOUS in the last 72 hours. Invalid input(s): Gume Jansen        Culture Results:    No results for input(s): CXSURG in the last 720 hours. Blood Culture Recent:   Recent Labs     10/21/22  5577   BC No Growth to date. Any change in status will be called. No results for input(s): BC, BLOODCULT2, ORG in the last 72 hours. Cultures:   No results for input(s): CULTURE in the last 72 hours. No results for input(s): Marilee CARTY in the last 72 hours.     No results for input(s): CXSURG in the last 72 hours. No results for input(s): MG, PHOS in the last 72 hours. Recent Labs     10/23/22  0330 10/24/22  0328   AST 13 11   ALT 8 7   BILITOT 0.3 <0.2   ALKPHOS 91 78           RAD:   CT ABDOMEN PELVIS WO CONTRAST Additional Contrast? None    Result Date: 10/21/2022  Patient: Carli Larios  Time Out: 23:25Exam(s): CT ABDOMEN + PELVIS Without Contrast EXAM:  CT Abdomen and Pelvis Without Intravenous ContrastCLINICAL HISTORY:   Reason for exam: abdominal pain. TECHNIQUE:  Axial computed tomography images of the abdomen and pelvis without intravenous contrast.COMPARISON:Comparison made to prior CT scan of abdomen pelvis from September 11, 2022. FINDINGS:  Lung bases:  Unremarkable. No mass. No consolidation. ABDOMEN:  Liver:  Unremarkable. Gallbladder and bile ducts: Status post cholecystectomy. No ductal dilation. Pancreas:  Unremarkable. No ductal dilation. Spleen:  Unremarkable. No splenomegaly. Adrenals:  Unremarkable. No mass. Kidneys and ureters: Advanced atrophy. No obstructing stones. No hydronephrosis. Stomach and bowel: There is thickening and edema of the distal transverse, descending, and sigmoid colon wall registered into the rectal wall with mild inflammation of the surrounding fat. Submucosal fat halo sign in the right colon. PELVIS:  Appendix:  No findings to suggest acute appendicitis. Bladder:  Unremarkable. No stones. Reproductive: Status post hysterectomy. ABDOMEN and PELVIS:  Intraperitoneal space:  Unremarkable. No free air. No significant fluid collection. Bones/joints:  Remote superior endplate fracture of the L2 vertebral body. No acute fracture. No dislocation. Soft tissues:  Unremarkable. Vasculature: IVC filter in place. No abdominal aortic aneurysm. Lymph nodes:  Unremarkable. No enlarged lymph nodes.     Findings concerning for colitis of the transverse, descending and sigmoid colon with proctitis, which may be of infectious or inflammatory etiologies. Electronically signed by Shahzad Butcher MD on 10-21-22 at 2325    XR CHEST (2 VW)    Result Date: 9/24/2022  EXAMINATION: Radiography of the Chest CLINICAL INDICATION: Surgical clearance COMPARISON: Prior study from 09/01/2021 is available. FINDINGS: Lungs/Pleura: Lungs are clear. No effusion. No pneumothorax. There is a 4 mm granuloma seen in the right upper lung. Mediastinum and Guillermina: Unremarkable. Heart and Vessels: Normal sized heart. Bones: No acute osseous abnormality. There is osteoarthritic changes of thoracic spine with calcification of the anterior longitudinal ligament. Lines/Tubes/Hardware: None     1. No acute process in the chest.    XR ACUTE ABD SERIES CHEST 1 VW    Result Date: 10/21/2022  NO PRIOR REPORT AVAILABLE Exam: ACUTE ABDOMINAL SERIES Clinical data: Constipation, abdominal discomfort. Technique: Two views of the abdomen with frontal view of the chest. Prior studies: CT scan of abdomen and pelvis dated 9/11/22. Radiograph of chest dated 9/23/22. Findings: Supine and upright views of the abdomen, as well as an AP chest. The lungs are grossly clear. Cardiac silhouette is within normal limits. Air in loops of nondistended small bowel and colon with a nonspecific, nonobstructive bowel gas pattern. No evidence of small bowel obstruction or intraperitoneal free air. Liver appears enlarged in size. No acute osseous abnormality is detected. Surgical sutures seen in right upper quadrant. Mild degenerative spine. Multiple phleboliths seen in pelvis. IVC filter at the L3-L4 level. 1. Nonspecific nonobstructive bowel gas pattern. Liver appears enlarged in size 2. No adverse interval change. Recommendation: Follow up as clinically indicated.  Electronically Signed by Sarabjit Wilkerson MD at 21-Oct-2022 10:56:30 PM EST                 Objective:   Vitals:   BP (!) 131/49   Pulse 87   Temp 97.9 °F (36.6 °C) (Oral)   Resp 18   Ht 5' 5\" (1.651 m)   Wt (!) 315 lb 4.1 oz (143 kg)   LMP 01/01/2000   SpO2 99%   BMI 52.46 kg/m²     Patient Vitals for the past 24 hrs:   BP Temp Temp src Pulse Resp SpO2   10/25/22 0548 (!) 131/49 97.9 °F (36.6 °C) Oral 87 18 99 %   10/25/22 0300 (!) 130/51 98.4 °F (36.9 °C) -- 83 18 100 %   10/25/22 0017 (!) 132/44 97.7 °F (36.5 °C) Oral 76 18 99 %   10/24/22 1630 (!) 142/51 97.9 °F (36.6 °C) Oral 83 16 97 %         24HR INTAKE/OUTPUT:    Intake/Output Summary (Last 24 hours) at 10/25/2022 0913  Last data filed at 10/24/2022 1449  Gross per 24 hour   Intake 240 ml   Output --   Net 240 ml         Physical Exam  Vitals and nursing note reviewed. Constitutional:       General: She is not in acute distress. Appearance: Normal appearance. She is obese. She is not ill-appearing, toxic-appearing or diaphoretic. HENT:      Head: Normocephalic and atraumatic. Right Ear: External ear normal.      Left Ear: External ear normal.      Nose: Nose normal. No congestion or rhinorrhea. Mouth/Throat:      Mouth: Mucous membranes are moist.      Pharynx: Oropharynx is clear. No oropharyngeal exudate or posterior oropharyngeal erythema. Eyes:      General: No scleral icterus. Right eye: No discharge. Left eye: No discharge. Extraocular Movements: Extraocular movements intact. Conjunctiva/sclera: Conjunctivae normal.      Pupils: Pupils are equal, round, and reactive to light. Cardiovascular:      Rate and Rhythm: Normal rate and regular rhythm. Pulses: Normal pulses. Heart sounds: Normal heart sounds. No murmur heard. No friction rub. No gallop. Pulmonary:      Effort: Pulmonary effort is normal. No respiratory distress. Breath sounds: Normal breath sounds. No stridor. No wheezing, rhonchi or rales. Chest:      Chest wall: No tenderness. Abdominal:      General: Bowel sounds are normal. There is no distension. Palpations: Abdomen is soft. Tenderness:  There is abdominal tenderness (Diffuse tenderness to palpation. No guarding, rigidity or rebound tenderness noted. ). There is no guarding or rebound. Musculoskeletal:         General: No swelling, tenderness, deformity or signs of injury. Normal range of motion. Cervical back: Normal range of motion and neck supple. No rigidity. No muscular tenderness. Right lower leg: No edema. Left lower leg: No edema. Skin:     General: Skin is warm and dry. Capillary Refill: Capillary refill takes less than 2 seconds. Coloration: Skin is not jaundiced or pale. Findings: No bruising, erythema, lesion or rash. Neurological:      General: No focal deficit present. Mental Status: She is alert and oriented to person, place, and time. Cranial Nerves: No cranial nerve deficit. Sensory: No sensory deficit. Motor: No weakness. Coordination: Coordination normal.   Psychiatric:         Mood and Affect: Mood normal.         Behavior: Behavior normal.         Thought Content: Thought content normal.         Judgment: Judgment normal.       Assessment/plan:     Patient Active Problem List    Diagnosis Date Noted    Colitis 10/22/2022     Priority: High    Sepsis ([facility] [address] 10/21/2022     Priority: Medium    Pyelonephritis 06/25/2022     Priority: Medium    ESRD on dialysis ([facility] [address] 07/24/2018     Priority: Medium    Hypoxia     Acute hemodialysis encounter ([facility] [address] 08/10/2021    Acute hypoxemic respiratory failure ([address] 08/10/2021    Diabetes ([facility] [address] 08/10/2021    Hypertension 08/10/2021    Allergic rhinitis with postnasal drip 04/06/2021    Deep vein thrombosis of lower extremity ([facility] [address] 10/09/2019    Disease of liver 10/09/2019    Thyroid disease 02/26/2017    Sleep apnea 02/26/2017     Last Assessment & Plan:   Patient states that she currently uses a CPAP at night for obstructive sleep apnea. She states that she has noticed that she has had more drainage and sinus pressure causing throat and uvula swelling.   She states that she feels like she is gagging or choking at times while she is trying to sleep. She is concerned and wishes to be evaluated for this. I will refer to ENT at this time. I will also treat for a possible sinus infection, unable to do steroids due to her being an insulin-dependent diabetic. Mood disorder ([address] 02/26/2017    Type 2 DM with CKD stage 3 and hypertension ([address] 02/26/2017    Hypertriglyceridemia 02/26/2017    Anxiety 02/26/2017    Vitamin D deficiency 02/26/2017    Anemia 02/26/2017    Hypocalcemia 02/26/2017    Anemia of chronic renal failure 12/12/2016    Hypertension associated with diabetes ([address] 10/17/2016    B12 deficiency 10/17/2016    Mixed diabetic hyperlipidemia associated with type 2 diabetes mellitus ([address] 10/17/2016    Morbid obesity ([address] 09/15/2011    Embolism ([address] 01/01/2004     Updating deleted diagnoses         Hospital Problems             Last Modified POA    * (Principal) Sepsis ([address] 10/21/2022 Yes    Colitis 10/22/2022 Yes    ESRD on dialysis ([address] 10/22/2022 Yes    Morbid obesity ([address] 10/22/2022 Yes    Sleep apnea 10/22/2022 Yes    Overview Signed 5/3/2021  9:45 AM by Marguerite Gerard MA     Last Assessment & Plan:   Patient states that she currently uses a CPAP at night for obstructive sleep apnea. She states that she has noticed that she has had more drainage and sinus pressure causing throat and uvula swelling. She states that she feels like she is gagging or choking at times while she is trying to sleep. She is concerned and wishes to be evaluated for this. I will refer to ENT at this time. I will also treat for a possible sinus infection, unable to do steroids due to her being an insulin-dependent diabetic.          Type 2 DM with CKD stage 3 and hypertension ([address] 10/22/2022 Yes    Hypertriglyceridemia 10/22/2022 Yes    Deep vein thrombosis of lower extremity ([address] 10/22/2022 Yes    Hypertension 10/22/2022 Yes   Principal Problem:    Sepsis ([address]  Active Problems:    Colitis    ESRD on dialysis [facility])    Morbid obesity ([facility] [address]    Sleep apnea    Type 2 DM with CKD stage 3 and hypertension ([facility] [address]    Hypertriglyceridemia    Deep vein thrombosis of lower extremity ([facility] [address]    Hypertension  Resolved Problems:    * No resolved hospital problems. *        Brief History/Summary:   Ms. Lindsey Henley, a 51-year-old female, history of CHF, CKD, HLD, HTN, GERI, DMT2, ESRD (on HD) presented to [facility] ED (10/21/2022), on account of an acute onset of nausea/4-day history of constipation. Patient admitted to [facility] (10/21/2022), further work-up and management. Sepsis  Colitis  Leukocytosis with a WBC of 21.4 (10/21/2022)  CT abdomen pelvis (10/21/2022): Findings concerning for colitis of the transverse, descending and sigmoid colon with proctitis, which may be of infectious or inflammatory etiologies  Antibiotic: Ciprofloxacin and Metronidazole  Lactic acid of 3.7 --> 2.4  --> 1.7 --> 1.3 (10/24/2022)  IV fluids  GI on board-appreciate recommendations  Continue symptomatic and supportive management including pain management as needed. ESRD, on scheduled HD   Nephrology consult  Continue scheduled HD as per nephrology rec     Diabetes Mellitus II, with hyperglycemia  Uncontrolled  Inpatient Regimens to include;  - Insulin Glargine (Lantus) 15 units subcu nightly  - Insulin Lispro (Humalog) 4 units subcu pre-meal 3 times a day  - Insulin Lispro (Humalog) on a High dose sliding scale  Monitor POC glucose, and adjust insulin regimen accordingly based on daily insulin requirement. Hypothyroidism  Levothyroxine 88 mcg p.o. daily  TSH level: 1.1 (10/22/2022)       Continue management of other chronic medical conditions - see above and orders. Advance Directive: Full Code    ADULT DIET; Regular; 4 carb choices (60 gm/meal); Low Fat/Low Chol/High Fiber/2 gm Na; Low Sodium (2 gm); Low Potassium (Less than 3000 mg/day);  Low Phosphorus (Less than 1000 mg); 1000 ml         Consults Made:   IP CONSULT TO NEPHROLOGY  IP CONSULT TO GI      DVT prophylaxis: Heparin        Discharge planning:   Continue management as noted above    Time Spent is  25 mins  in the examination, evaluation, counseling and review of medications, assessment and plan.      Electronically signed by   Madelyn Laguna MD, MPH, MD,   Internal Medicine Hospitalist   10/25/2022 9:13 AM

## 2022-10-25 NOTE — CARE COORDINATION
10/24/22 1645   Service Assessment   Patient Orientation Alert and Oriented   Cognition Alert   History Provided By Patient;Significant Other   Primary Caregiver Self   Accompanied By/Relationship spouse and daughter at [facility] Other;Children;Family Members   [facility] is: Legal Next of Lenin Lux   PCP Verified by CM Yes   Last Visit to PCP Within last 6 months   Prior Functional Level Assistance with the following:;Bathing;Cooking;Housework; Shopping   Current Functional Level Assistance with the following:;Bathing;Cooking;Housework; Shopping   Can patient return to prior living arrangement Yes   Ability to make needs known: Good   Family able to assist with home care needs: Yes   Financial Resources Medicaid; Medicare   Social/Functional History   Lives With Spouse;Daughter   Type of [facility] One level   Home Access Stairs to enter with rails   Entrance Stairs - Number of Steps 3   Bathroom Shower/Tub Tub/Shower unit; Shower chair without back   Bathroom Toilet Bedside commode   [address] [place] chair   [address]  (cpap with Normal)   Receives Help From Family;Friend(s)   ADL Assistance Independent  (some assist with bathing task)   [id number] [place] assistance   Ambulation Assistance Independent   Transfer Assistance Independent   Active  Yes  (hasn't been driving lately; spouse does)   Occupation On disability   Discharge Planning   Type of [address] Prior To Admission C-pap  (Normal)   [address]   DME Ordered? No   Potential Assistance Purchasing Medications No   Patient expects to be discharged to: House   One/Two Story Residence One story   History of falls?  1   Services At/After Discharge   Transition of Care Consult (CM Consult) Discharge Planning   Services At/After Discharge Home Health;DME   Mode of Transport at Discharge Other (see comment)  (spouse)   Confirm Follow Up Transport Family   Late entry  Electronically signed by IVY Roberts on 10/25/2022 at 5:55 PM

## 2022-10-25 NOTE — PROGRESS NOTES
Nephrology ([facility]) Progress Note    Patient:  Efrain Tolentino  YOB: 1960  Date of Service: 10/25/2022  MRN: [id number]   Acct: [de-identified]   Primary Care Physician: Mary Lorenzo MD  Advance Directive: Full Code  Admit Date: 10/21/2022       Hospital Day: 4  Referring Provider: Rosmery Estrada MD    Patient independently seen and examined, Chart, Consults, Notes, Operative notes, Labs, Cardiology, and Radiology studies reviewed as available. Subjective:  Efrain Tolentino is a 58 y.o. female for whom we were consulted for evaluation and treatment of end-stage renal disease. She has type II diabetic nephropathy and gets home hemodialysis 4 times a week. Last dialysis was on Wednesday prior to admission. Presented to the emergency room with complaining of severe constipation, abdominal pain. Her white count is 21,000 now diagnosed with acute bacterial colitis. Patient has history of insulin-dependent type 2 diabetes, hypertension, morbid abdominal obesity and chronic diastolic congestive heart failure. She denied any nausea vomiting or diarrhea but complaining of severe constipation. She also denied any shortness of breath. Today, no overnight events. She notes continued difficulties having a bowel movements bu had some liquid BM reported this AM.  She denied chest pain, shortness of air at rest, nausea or vomiting.     Dialysis   Pt was seen on renal replacement therapy and I have personally seen and evaluated the patient and directed the therapy  Modality: Hemodialysis  Access: Arterial Venous Fistula  Location: left upper  QB: 450  QD: 700  UF: 860      Allergies:  Codeine    Medicines:  Current Facility-Administered Medications   Medication Dose Route Frequency Provider Last Rate Last Admin    sodium chloride flush 0.9 % injection 5-40 mL  5-40 mL IntraVENous 2 times per day Lorenza López MD   10 mL at 10/25/22 0927    sodium chloride flush 0.9 % injection 5-40 mL  5-40 mL IntraVENous PRN Benjy Lea MD        0.9 % sodium chloride infusion   IntraVENous PRN Benjy Lea MD 45 mL/hr at 10/23/22 1635 45 mL/hr at 10/23/22 1635    ondansetron (ZOFRAN-ODT) disintegrating tablet 4 mg  4 mg Oral Q8H PRN Benjy Lea MD        Or    ondansetron [facility]) injection 4 mg  4 mg IntraVENous Q6H PRN Benjy Lea MD        heparin (porcine) injection 5,000 Units  5,000 Units SubCUTAneous 3 times per day Benjy Lea MD   5,000 Units at 10/25/22 0538    acetaminophen (TYLENOL) tablet 650 mg  650 mg Oral Q6H PRN Benjy Lea MD   650 mg at 10/23/22 0018    Or    acetaminophen (TYLENOL) suppository 650 mg  650 mg Rectal Q6H PRN Benjy Lea MD        melatonin disintegrating tablet 5 mg  5 mg Oral Nightly PRN Benjy Lea MD   5 mg at 10/25/22 0106    polyethylene glycol (GLYCOLAX) packet 17 g  17 g Oral Daily PRN Benjy Lea MD   17 g at 10/24/22 1147    calcium carbonate (TUMS) chewable tablet 500 mg  500 mg Oral TID PRN Benjy Lea MD        aspirin chewable tablet 81 mg  81 mg Oral Daily Benjy Lea MD   81 mg at 10/24/22 1009    busPIRone (BUSPAR) tablet 20 mg  20 mg Oral BID Benjy Lea MD   20 mg at 10/24/22 2041    buPROPion [facility]) extended release tablet 150 mg  150 mg Oral BID Benjy Lea MD   150 mg at 10/24/22 2042    calcitRIOL (ROCALTROL) capsule 1.5 mcg  1.5 mcg Oral Daily Benjy Lea MD   1.5 mcg at 10/24/22 1010    allopurinol (ZYLOPRIM) tablet 100 mg  100 mg Oral BID Benjy Lea MD   100 mg at 10/24/22 2042    cetirizine (ZYRTEC) tablet 10 mg  10 mg Oral PRN Benjy Lea MD        citalopram (CELEXA) tablet 40 mg  40 mg Oral Daily Benjy Lea MD   40 mg at 10/24/22 1010    lamoTRIgine (LAMICTAL) tablet 100 mg  100 mg Oral Nightly Benjy Lea MD   100 mg at 10/24/22 2041    levothyroxine (SYNTHROID) tablet 88 mcg  88 mcg Oral Daily Benjy Lea MD   88 mcg at 10/25/22 0538    rosuvastatin (CRESTOR) tablet 20 mg  20 mg Oral Nightly Yamini Talbert MD   20 mg at 10/24/22 2042    sevelamer (RENVELA) tablet 800 mg  800 mg Oral TID  Yamini Talbert MD   800 mg at 10/24/22 1659    ascorbic acid (VITAMIN C) tablet 500 mg  500 mg Oral BID Yamini Talbert MD   500 mg at 10/24/22 2041    insulin glargine (LANTUS) injection vial 15 Units  15 Units SubCUTAneous Nightly Yamini Talbert MD        insulin glargine (LANTUS) injection vial 15 Units  15 Units SubCUTAneous Once Yamini Talbert MD        insulin lispro (HUMALOG) injection vial 0-16 Units  0-16 Units SubCUTAneous TID  Yamnii Talbert MD        insulin lispro (HUMALOG) injection vial 0-4 Units  0-4 Units SubCUTAneous Nightly Yamini Talbert MD        glucose chewable tablet 16 g  4 tablet Oral PRN Yamini Talbert MD        glucagon (rDNA) injection 1 mg  1 mg IntraMUSCular PRN Yamini Talbert MD        insulin lispro (HUMALOG) injection vial 4 Units  4 Units SubCUTAneous TID  Ayaka Urbina MD        epoetin jose ramon-epbx (RETACRIT) injection 3,000 Units  3,000 Units SubCUTAneous Once per day on Mon Wed Fri Rita Renee MD   3,000 Units at 10/24/22 1115    ondansetron (ZOFRAN-ODT) disintegrating tablet 4 mg  4 mg Oral Once Woody Farmer MD        0.9 % sodium chloride bolus  250 mL IntraVENous Once Woody Farmer MD        ciprofloxacin (CIPRO) IVPB 400 mg  400 mg IntraVENous Q24H Yamini Talbert MD   Stopped at 10/25/22 0243    metronidazole (FLAGYL) 500 mg in 0.9% NaCl 100 mL IVPB premix  500 mg IntraVENous Q8H Yamini Talbert MD   Stopped at 10/25/22 0308    mineral oil enema 1 enema  1 enema Rectal Daily PRN Yamini Talbert MD           Past Medical History:  Past Medical History:   Diagnosis Date    Anxiety     Arthritis     CHF [facility]congestive [facility] failure) ([facility]     CKD (chronic kidney disease)     stage 4    Colon polyp     Depression     Embolism - blood clot 2004    Hemodialysis patient ([facility]     5 days a week at home, takes Sunday + one other day off each week    Hx of blood clots     Hyperlipidemia     Hypertension     Obesity, morbid, BMI 50 or higher (HCC)     Sleep apnea     CPAP    Thyroid disease     Type II or unspecified type diabetes mellitus without mention of complication, not stated as uncontrolled        Past Surgical History:  Past Surgical History:   Procedure Laterality Date    CHOLECYSTECTOMY, LAPAROSCOPIC  1986    DIALYSIS CATHETER INSERTION N/A 08/11/2021    INSERTION CATHETER DIALYSIS performed by Olivia Root MD at [place] [address] Left 06/18/2021    LEFT BRACHIAL-CEPHALIC AV FISTULA CREATION performed by Olivia Root MD at [address] 07/27/2021    LEFT UPPER EXTREMITY CEPHALIC VEIN SUPERFICIALIZATION performed by Olivia Root MD at [address] [place] [address]  2005    FISTULAGRAM Left 09/30/2022    LEFT UPPER EXTREMITY AV FISTULAGRAM, POSSIBLE BALLOON ANGIOPLASTY, STENT POSS REVISION performed by Olivia Root MD at [address] [place] [address] ([address] [place] [address])  10/17/2018    POLYPECTOMY      ~2012 had colon polyps, 2022 had a benign polyp and 2 possibly cancerous polyps, DUE FOR REPEAT IN DEC 2022    RECTAL SURGERY  1981    fistula repair    TONSILLECTOMY AND ADENOIDECTOMY      at age 12 years    VASCULAR SURGERY  07/12/2021    [facility]- Ultrasound-guided cannulation left distal upper arm cephalic vein with 4 [place] glide sheath, Left upper extremity fistulogram's including venography the superior vena cava    VASCULAR SURGERY  07/27/2021    [facility]- Superficialization of the left upper arm cephalic vein arteriovenous fistula       Family History  Family History   Problem Relation Age of Onset    Lung Cancer Mother     Brain Cancer Mother     Heart Attack Father     Prostate Cancer Father     Heart Disease Father     Stroke Father     Obesity Father     No Known Problems Brother     Uterine Cancer Maternal Grandmother     Cervical Cancer Maternal Grandmother     Diabetes Maternal 24 hrs:   BP Temp Temp src Pulse Resp SpO2   10/25/22 0548 (!) 131/49 97.9 °F (36.6 °C) Oral 87 18 99 %   10/25/22 0300 (!) 130/51 98.4 °F (36.9 °C) -- 83 18 100 %   10/25/22 0017 (!) 132/44 97.7 °F (36.5 °C) Oral 76 18 99 %   10/24/22 1630 (!) 142/51 97.9 °F (36.6 °C) Oral 83 16 97 %       Intake/Output Summary (Last 24 hours) at 10/25/2022 1056  Last data filed at 10/24/2022 1449  Gross per 24 hour   Intake 120 ml   Output --   Net 120 ml     General: awake/alert   Chest:  clear to auscultation bilaterally  CVS: regular rate and rhythm  Abdominal: soft, nontender, normal bowel sounds  Extremities: no cyanosis or edema  Skin: warm and dry without rash    Labs:  BMP:   Recent Labs     10/23/22  0330 10/24/22  0328 10/25/22  0327    136 132*   K 4.0 3.7 3.7   CL 92* 93* 89*   CO2 30* 25 24   BUN 22 37* 44*   CREATININE 5.2* 7.3* 8.6*   CALCIUM 10.6* 10.6* 10.9*     CBC:   Recent Labs     10/23/22  0330 10/24/22  0328 10/25/22  0327   WBC 13.2* 12.7* 13.5*   HGB 9.6* 8.2* 8.2*   HCT 31.1* 25.7* 24.8*   .3* 111.7* 109.7*    221 232     LIVER PROFILE:   Recent Labs     10/23/22  0330 10/24/22  0328   AST 13 11   ALT 8 7   BILITOT 0.3 <0.2   ALKPHOS 91 78     PT/INR: No results for input(s): PROTIME, INR in the last 72 hours. APTT: No results for input(s): APTT in the last 72 hours. BNP:  No results for input(s): BNP in the last 72 hours. Ionized Calcium:No results for input(s): IONCA in the last 72 hours. Magnesium:No results for input(s): MG in the last 72 hours. Phosphorus:No results for input(s): PHOS in the last 72 hours. HgbA1C: No results for input(s): LABA1C in the last 72 hours. Hepatic:   Recent Labs     10/23/22  0330 10/24/22  0328   ALKPHOS 91 78   ALT 8 7   AST 13 11   PROT 7.2 6.3*   BILITOT 0.3 <0.2   LABALBU 4.4 4.1     Lactic Acid:   Recent Labs     10/24/22  0328   LACTA 1.3     Troponin: No results for input(s): CKTOTAL, CKMB, TROPONINT in the last 72 hours.   ABGs: No results found for: PHART, PO2ART, UIX0VTG  CRP:  No results for input(s): CRP in the last 72 hours. Sed Rate:  No results for input(s): SEDRATE in the last 72 hours. Culture Results:   Blood Culture Recent:   Recent Labs     10/21/22  2253   BC No Growth to date. Any change in status will be called. Urine Culture Recent : No results for input(s): LABURIN in the last 720 hours. Radiology reports as per the Radiologist  Radiology: CT ABDOMEN PELVIS WO CONTRAST Additional Contrast? None    Result Date: 10/21/2022  Patient: Richard Dunne  Time Out: 23:25Exam(s): CT ABDOMEN + PELVIS Without Contrast EXAM:  CT Abdomen and Pelvis Without Intravenous ContrastCLINICAL HISTORY:   Reason for exam: abdominal pain. TECHNIQUE:  Axial computed tomography images of the abdomen and pelvis without intravenous contrast.COMPARISON:Comparison made to prior CT scan of abdomen pelvis from September 11, 2022. FINDINGS:  Lung bases:  Unremarkable. No mass. No consolidation. ABDOMEN:  Liver:  Unremarkable. Gallbladder and bile ducts: Status post cholecystectomy. No ductal dilation. Pancreas:  Unremarkable. No ductal dilation. Spleen:  Unremarkable. No splenomegaly. Adrenals:  Unremarkable. No mass. Kidneys and ureters: Advanced atrophy. No obstructing stones. No hydronephrosis. Stomach and bowel: There is thickening and edema of the distal transverse, descending, and sigmoid colon wall registered into the rectal wall with mild inflammation of the surrounding fat. Submucosal fat halo sign in the right colon. PELVIS:  Appendix:  No findings to suggest acute appendicitis. Bladder:  Unremarkable. No stones. Reproductive: Status post hysterectomy. ABDOMEN and PELVIS:  Intraperitoneal space:  Unremarkable. No free air. No significant fluid collection. Bones/joints:  Remote superior endplate fracture of the L2 vertebral body. No acute fracture. No dislocation. Soft tissues:  Unremarkable.   Vasculature: IVC filter in place. No abdominal aortic aneurysm. Lymph nodes:  Unremarkable. No enlarged lymph nodes. Findings concerning for colitis of the transverse, descending and sigmoid colon with proctitis, which may be of infectious or inflammatory etiologies. Electronically signed by Aliya Painter MD on 10-21-22 at 2325    XR ACUTE ABD SERIES CHEST 1 VW    Result Date: 10/21/2022  NO PRIOR REPORT AVAILABLE Exam: ACUTE ABDOMINAL SERIES Clinical data: Constipation, abdominal discomfort. Technique: Two views of the abdomen with frontal view of the chest. Prior studies: CT scan of abdomen and pelvis dated 9/11/22. Radiograph of chest dated 9/23/22. Findings: Supine and upright views of the abdomen, as well as an AP chest. The lungs are grossly clear. Cardiac silhouette is within normal limits. Air in loops of nondistended small bowel and colon with a nonspecific, nonobstructive bowel gas pattern. No evidence of small bowel obstruction or intraperitoneal free air. Liver appears enlarged in size. No acute osseous abnormality is detected. Surgical sutures seen in right upper quadrant. Mild degenerative spine. Multiple phleboliths seen in pelvis. IVC filter at the L3-L4 level. 1. Nonspecific nonobstructive bowel gas pattern. Liver appears enlarged in size 2. No adverse interval change. Recommendation: Follow up as clinically indicated.  Electronically Signed by Brendon Chi MD at 21-Oct-2022 10:56:30 PM EST                Assessment   End-stage renal disease  Diabetes type 2 with diabetic nephropathy  Anemia and chronic kidney disease  Acute bacterial colitis  Constipation  Morbid obesity  Hyperphosphatemia  Secondary hyperparathyroidism      Plan:  Discussed with patient, nursing  Work-up reviewed to date  Monitor labs  Continue phosphorus binders with Renvela per the hospital formulary  Bowel regimen and IV antibiotics per primary service    Kvng Bennett MD  10/25/22  10:56 AM

## 2022-10-25 NOTE — PLAN OF CARE
Problem: ABCDS Injury Assessment  Goal: Absence of physical injury  10/25/2022 1339 by Danny Contreras RN  Outcome: Progressing  10/25/2022 0141 by Michlele Garrido RN  Outcome: Progressing     Problem: Discharge Planning  Goal: Discharge to home or other facility with appropriate resources  10/25/2022 1339 by Danny Contreras RN  Outcome: Progressing  10/25/2022 0141 by Michelle Garrido RN  Outcome: Progressing  Flowsheets (Taken 10/24/2022 2309)  Discharge to home or other facility with appropriate resources: Identify barriers to discharge with patient and caregiver     Problem: Pain  Goal: Verbalizes/displays adequate comfort level or baseline comfort level  10/25/2022 1339 by Danny Contreras RN  Outcome: Progressing  10/25/2022 0141 by Michelle Garrido RN  Outcome: Progressing     Problem: Chronic Conditions and Co-morbidities  Goal: Patient's chronic conditions and co-morbidity symptoms are monitored and maintained or improved  Outcome: Progressing

## 2022-10-25 NOTE — PLAN OF CARE
Problem: ABCDS Injury Assessment  Goal: Absence of physical injury  10/25/2022 0141 by Katja Gordon RN  Outcome: Progressing  10/24/2022 1257 by Maricarmen Montoya RN  Outcome: Progressing     Problem: Discharge Planning  Goal: Discharge to home or other facility with appropriate resources  10/25/2022 0141 by Katja Gordon RN  Outcome: Progressing  Flowsheets (Taken 10/24/2022 2301)  Discharge to home or other facility with appropriate resources: Identify barriers to discharge with patient and caregiver  10/24/2022 1257 by Maricarmen Montoya RN  Outcome: Progressing     Problem: Pain  Goal: Verbalizes/displays adequate comfort level or baseline comfort level  10/25/2022 0141 by Katja Gordon RN  Outcome: Progressing  10/24/2022 1257 by Maricarmen Montoya RN  Outcome: Progressing

## 2022-10-26 ENCOUNTER — TELEPHONE (OUTPATIENT)
Dept: FAMILY MEDICINE CLINIC | Facility: CLINIC | Age: 62
End: 2022-10-26

## 2022-10-26 VITALS
SYSTOLIC BLOOD PRESSURE: 115 MMHG | BODY MASS INDEX: 48.82 KG/M2 | HEART RATE: 87 BPM | TEMPERATURE: 98.5 F | HEIGHT: 65 IN | DIASTOLIC BLOOD PRESSURE: 66 MMHG | WEIGHT: 293 LBS | RESPIRATION RATE: 14 BRPM | OXYGEN SATURATION: 97 %

## 2022-10-26 LAB
ANION GAP SERPL CALCULATED.3IONS-SCNC: 13 MMOL/L (ref 7–19)
BASOPHILS ABSOLUTE: 0.1 K/UL (ref 0–0.2)
BASOPHILS RELATIVE PERCENT: 0.5 % (ref 0–1)
BUN BLDV-MCNC: 30 MG/DL (ref 8–23)
CALCIUM SERPL-MCNC: 10.8 MG/DL (ref 8.8–10.2)
CHLORIDE BLD-SCNC: 94 MMOL/L (ref 98–111)
CO2: 31 MMOL/L (ref 22–29)
CREAT SERPL-MCNC: 5.8 MG/DL (ref 0.5–0.9)
EKG P AXIS: 65 DEGREES
EKG P-R INTERVAL: 152 MS
EKG Q-T INTERVAL: 438 MS
EKG QRS DURATION: 100 MS
EKG QTC CALCULATION (BAZETT): 466 MS
EKG T AXIS: 50 DEGREES
EOSINOPHILS ABSOLUTE: 0.3 K/UL (ref 0–0.6)
EOSINOPHILS RELATIVE PERCENT: 2.4 % (ref 0–5)
GFR SERPL CREATININE-BSD FRML MDRD: 8 ML/MIN/{1.73_M2}
GLUCOSE BLD-MCNC: 129 MG/DL (ref 70–99)
GLUCOSE BLD-MCNC: 137 MG/DL (ref 70–99)
GLUCOSE BLD-MCNC: 82 MG/DL (ref 74–109)
HCT VFR BLD CALC: 26.1 % (ref 37–47)
HEMOGLOBIN: 8.3 G/DL (ref 12–16)
IMMATURE GRANULOCYTES #: 0.4 K/UL
LYMPHOCYTES ABSOLUTE: 1.9 K/UL (ref 1.1–4.5)
LYMPHOCYTES RELATIVE PERCENT: 17.4 % (ref 20–40)
MCH RBC QN AUTO: 35.5 PG (ref 27–31)
MCHC RBC AUTO-ENTMCNC: 31.8 G/DL (ref 33–37)
MCV RBC AUTO: 111.5 FL (ref 81–99)
MONOCYTES ABSOLUTE: 0.8 K/UL (ref 0–0.9)
MONOCYTES RELATIVE PERCENT: 7.2 % (ref 0–10)
NEUTROPHILS ABSOLUTE: 7.6 K/UL (ref 1.5–7.5)
NEUTROPHILS RELATIVE PERCENT: 68.9 % (ref 50–65)
PDW BLD-RTO: 15.8 % (ref 11.5–14.5)
PERFORMED ON: ABNORMAL
PERFORMED ON: ABNORMAL
PLATELET # BLD: 232 K/UL (ref 130–400)
PMV BLD AUTO: 9.5 FL (ref 9.4–12.3)
POTASSIUM REFLEX MAGNESIUM: 4.1 MMOL/L (ref 3.5–5)
RBC # BLD: 2.34 M/UL (ref 4.2–5.4)
SODIUM BLD-SCNC: 138 MMOL/L (ref 136–145)
WBC # BLD: 11.1 K/UL (ref 4.8–10.8)

## 2022-10-26 PROCEDURE — 6360000002 HC RX W HCPCS: Performed by: INTERNAL MEDICINE

## 2022-10-26 PROCEDURE — 93010 ELECTROCARDIOGRAM REPORT: CPT | Performed by: INTERNAL MEDICINE

## 2022-10-26 PROCEDURE — 2500000003 HC RX 250 WO HCPCS: Performed by: HOSPITALIST

## 2022-10-26 PROCEDURE — 85025 COMPLETE CBC W/AUTO DIFF WBC: CPT

## 2022-10-26 PROCEDURE — 6370000000 HC RX 637 (ALT 250 FOR IP): Performed by: HOSPITALIST

## 2022-10-26 PROCEDURE — 2580000003 HC RX 258: Performed by: HOSPITALIST

## 2022-10-26 PROCEDURE — 80048 BASIC METABOLIC PNL TOTAL CA: CPT

## 2022-10-26 PROCEDURE — 82947 ASSAY GLUCOSE BLOOD QUANT: CPT

## 2022-10-26 PROCEDURE — 36415 COLL VENOUS BLD VENIPUNCTURE: CPT

## 2022-10-26 PROCEDURE — 6360000002 HC RX W HCPCS: Performed by: HOSPITALIST

## 2022-10-26 RX ORDER — CIPROFLOXACIN 500 MG/1
250 TABLET, FILM COATED ORAL 2 TIMES DAILY
Qty: 5 TABLET | Refills: 0 | Status: SHIPPED | OUTPATIENT
Start: 2022-10-26 | End: 2022-10-31

## 2022-10-26 RX ORDER — METRONIDAZOLE 500 MG/1
500 TABLET ORAL 3 TIMES DAILY
Qty: 15 TABLET | Refills: 0 | Status: SHIPPED | OUTPATIENT
Start: 2022-10-26 | End: 2022-10-31

## 2022-10-26 RX ADMIN — EPOETIN ALFA-EPBX 3000 UNITS: 3000 INJECTION, SOLUTION INTRAVENOUS; SUBCUTANEOUS at 08:42

## 2022-10-26 RX ADMIN — CITALOPRAM HYDROBROMIDE 40 MG: 20 TABLET ORAL at 08:41

## 2022-10-26 RX ADMIN — HEPARIN SODIUM 5000 UNITS: 5000 INJECTION INTRAVENOUS; SUBCUTANEOUS at 05:46

## 2022-10-26 RX ADMIN — BUSPIRONE HYDROCHLORIDE 20 MG: 10 TABLET ORAL at 08:41

## 2022-10-26 RX ADMIN — SEVELAMER CARBONATE 800 MG: 800 TABLET, FILM COATED ORAL at 08:42

## 2022-10-26 RX ADMIN — BUPROPION HYDROCHLORIDE 150 MG: 150 TABLET, EXTENDED RELEASE ORAL at 08:41

## 2022-10-26 RX ADMIN — METRONIDAZOLE 500 MG: 500 INJECTION, SOLUTION INTRAVENOUS at 05:46

## 2022-10-26 RX ADMIN — ALLOPURINOL 100 MG: 100 TABLET ORAL at 08:41

## 2022-10-26 RX ADMIN — CIPROFLOXACIN 400 MG: 2 INJECTION, SOLUTION INTRAVENOUS at 00:25

## 2022-10-26 RX ADMIN — LEVOTHYROXINE SODIUM 88 MCG: 0.09 TABLET ORAL at 05:46

## 2022-10-26 RX ADMIN — CALCITRIOL CAPSULES 0.25 MCG 1.5 MCG: 0.25 CAPSULE ORAL at 08:41

## 2022-10-26 RX ADMIN — Medication 10 ML: at 08:42

## 2022-10-26 RX ADMIN — ASPIRIN 81 MG 81 MG: 81 TABLET ORAL at 08:41

## 2022-10-26 RX ADMIN — SEVELAMER CARBONATE 800 MG: 800 TABLET, FILM COATED ORAL at 12:37

## 2022-10-26 RX ADMIN — OXYCODONE HYDROCHLORIDE AND ACETAMINOPHEN 500 MG: 500 TABLET ORAL at 08:41

## 2022-10-26 NOTE — TELEPHONE ENCOUNTER
[facility] called letting us know Maria C was in the hospital there and was being discharged today. She is sepsis and they are discharging her to Home Health.   I let Dr Soliman know and he said he would follow up on her care.

## 2022-10-26 NOTE — PROGRESS NOTES
Nephrology ([facility]) Progress Note    Patient:  Bharat Orozco  YOB: 1960  Date of Service: 10/26/2022  MRN: [id number]   Acct: [de-identified]   Primary Care Physician: Edouard Garrido MD  Advance Directive: Full Code  Admit Date: 10/21/2022       Hospital Day: 5  Referring Provider: Sarah Vaughn MD    Patient independently seen and examined, Chart, Consults, Notes, Operative notes, Labs, Cardiology, and Radiology studies reviewed as available. Subjective:  Bharat Orozco is a 58 y.o. female for whom we were consulted for evaluation and treatment of end-stage renal disease. She has type II diabetic nephropathy and gets home hemodialysis 4 times a week. Last dialysis was on Wednesday prior to admission. Presented to the emergency room with complaining of severe constipation, abdominal pain. Her white count is 21,000 now diagnosed with acute bacterial colitis. Patient has history of insulin-dependent type 2 diabetes, hypertension, morbid abdominal obesity and chronic diastolic congestive heart failure. She denied any nausea vomiting or diarrhea but complaining of severe constipation. She also denied any shortness of breath. Today, no overnight events. She notes continued difficulties having a fully formed bowel movements bu had again had some liquid BM reported this AM.  She denied chest pain, shortness of air at rest, nausea or vomiting.         Allergies:  Codeine    Medicines:  Current Facility-Administered Medications   Medication Dose Route Frequency Provider Last Rate Last Admin    sodium chloride flush 0.9 % injection 5-40 mL  5-40 mL IntraVENous 2 times per day Sadaf Choi MD   10 mL at 10/26/22 0842    sodium chloride flush 0.9 % injection 5-40 mL  5-40 mL IntraVENous PRN Sadaf Choi MD        0.9 % sodium chloride infusion   IntraVENous PRN Sadaf Choi MD 45 mL/hr at 10/23/22 1635 45 mL/hr at 10/23/22 1635    ondansetron (ZOFRAN-ODT) disintegrating tablet 4 mg  4 mg Oral Q8H PRN Paloma Og MD        Or    ondansetron [facility]) injection 4 mg  4 mg IntraVENous Q6H PRN Paloma Og MD        heparin (porcine) injection 5,000 Units  5,000 Units SubCUTAneous 3 times per day Paloma Og MD   5,000 Units at 10/26/22 0546    acetaminophen (TYLENOL) tablet 650 mg  650 mg Oral Q6H PRN Palmoa Og MD   650 mg at 10/23/22 0018    Or    acetaminophen (TYLENOL) suppository 650 mg  650 mg Rectal Q6H PRN Paloma Og MD        melatonin disintegrating tablet 5 mg  5 mg Oral Nightly PRN Paloma Og MD   5 mg at 10/25/22 2249    polyethylene glycol (GLYCOLAX) packet 17 g  17 g Oral Daily PRN Paloma Og MD   17 g at 10/24/22 1147    calcium carbonate (TUMS) chewable tablet 500 mg  500 mg Oral TID PRN Paloma Og MD        aspirin chewable tablet 81 mg  81 mg Oral Daily Paloma Og MD   81 mg at 10/26/22 0841    busPIRone (BUSPAR) tablet 20 mg  20 mg Oral BID Paloma Og MD   20 mg at 10/26/22 0841    buPROPion [facility]) extended release tablet 150 mg  150 mg Oral BID Paloma Og MD   150 mg at 10/26/22 8663    calcitRIOL (ROCALTROL) capsule 1.5 mcg  1.5 mcg Oral Daily Paloma Og MD   1.5 mcg at 10/26/22 0841    allopurinol (ZYLOPRIM) tablet 100 mg  100 mg Oral BID Paloma Og MD   100 mg at 10/26/22 0841    cetirizine (ZYRTEC) tablet 10 mg  10 mg Oral PRN Paloma Og MD        citalopram (CELEXA) tablet 40 mg  40 mg Oral Daily Paloma Og MD   40 mg at 10/26/22 0841    lamoTRIgine (LAMICTAL) tablet 100 mg  100 mg Oral Nightly Paloma Og MD   100 mg at 10/25/22 2250    levothyroxine (SYNTHROID) tablet 88 mcg  88 mcg Oral Daily Paloma Og MD   88 mcg at 10/26/22 0546    rosuvastatin (CRESTOR) tablet 20 mg  20 mg Oral Nightly Paloma Og MD   20 mg at 10/25/22 2249    sevelamer (RENVELA) tablet 800 mg  800 mg Oral TID WC Paloma Og MD   800 mg at 10/26/22 9679    ascorbic acid (VITAMIN C) tablet 500 mg  500 mg Oral BID Fabio Stephens MD   500 mg at 10/26/22 0841    insulin glargine (LANTUS) injection vial 15 Units  15 Units SubCUTAneous Nightly Fabio Stephens MD        insulin glargine (LANTUS) injection vial 15 Units  15 Units SubCUTAneous Once Fabio Stephens MD        insulin lispro (HUMALOG) injection vial 0-16 Units  0-16 Units SubCUTAneous TID  Fabio Stephens MD        insulin lispro (HUMALOG) injection vial 0-4 Units  0-4 Units SubCUTAneous Nightly Fabio Stephens MD        glucose chewable tablet 16 g  4 tablet Oral PRN Fabio Stephens MD        glucagon (rDNA) injection 1 mg  1 mg IntraMUSCular PRN Fabio Stephens MD        insulin lispro (HUMALOG) injection vial 4 Units  4 Units SubCUTAneous TID  Richard Vernon MD        epoetin jose ramon-epbx (RETACRIT) injection 3,000 Units  3,000 Units SubCUTAneous Once per day on Mon Wed Fri Saulo Mejias MD   3,000 Units at 10/26/22 0842    ondansetron (ZOFRAN-ODT) disintegrating tablet 4 mg  4 mg Oral Once Kirstin Rodriguez MD        0.9 % sodium chloride bolus  250 mL IntraVENous Once Kirstin Rodriguez MD        ciprofloxacin (CIPRO) IVPB 400 mg  400 mg IntraVENous Q24H Fabio Stephens MD   Stopped at 10/26/22 0116    metronidazole (FLAGYL) 500 mg in 0.9% NaCl 100 mL IVPB premix  500 mg IntraVENous Q8H Fabio Stephens MD   Stopped at 10/26/22 9899    mineral oil enema 1 enema  1 enema Rectal Daily PRN Fabio Stephens MD           Past Medical History:  Past Medical History:   Diagnosis Date    Anxiety     Arthritis     [facility]     CKD (chronic kidney disease)     stage 4    Colon polyp     Depression     Embolism - blood clot 2004    H[facility] patient ([facility]     5 days a week at home, takes Sunday + one other day off each week    Hx of blood clots     Hyperlipidemia     Hypertension     Obesity, morbid, BMI 50 or higher ([facility]     Sleep apnea     CPAP    Thyroid disease     Type II or unspecified type diabetes mellitus without mention of complication, not stated as uncontrolled        Past Surgical History:  Past Surgical History:   Procedure Laterality Date    CHOLECYSTECTOMY, LAPAROSCOPIC  1986    DIALYSIS CATHETER INSERTION N/A 08/11/2021    INSERTION CATHETER DIALYSIS performed by Marcial Crowley MD at [place] [address] Left 06/18/2021    LEFT BRACHIAL-CEPHALIC AV FISTULA CREATION performed by Marcial Crowley MD at [place] [address] Left 07/27/2021    LEFT UPPER EXTREMITY CEPHALIC VEIN SUPERFICIALIZATION performed by Marcial Crowley MD at [address] [place] [address]  2005    FISTULAGRAM Left 09/30/2022    LEFT UPPER EXTREMITY AV FISTULAGRAM, POSSIBLE BALLOON ANGIOPLASTY, STENT POSS REVISION performed by Marcial Crowley MD at [address] [place] [address] ([facility])  10/17/2018    POLYPECTOMY      ~2012 had colon polyps, 2022 had a benign polyp and 2 possibly cancerous polyps, DUE FOR REPEAT IN DEC 2022    RECTAL SURGERY  1981    fistula repair    TONSILLECTOMY AND ADENOIDECTOMY      at age 12 years    VASCULAR SURGERY  07/12/2021    [facility]- Ultrasound-guided cannulation left distal upper arm cephalic vein with 4 [place] glide sheath, Left upper extremity fistulogram's including venography the superior vena cava    VASCULAR SURGERY  07/27/2021    [facility]- Superficialization of the left upper arm cephalic vein arteriovenous fistula       Family History  Family History   Problem Relation Age of Onset    Lung Cancer Mother     Brain Cancer Mother     Heart Attack Father     Prostate Cancer Father     Heart Disease Father     Stroke Father     Obesity Father     No Known Problems Brother     Uterine Cancer Maternal Grandmother     Cervical Cancer Maternal Grandmother     Diabetes Maternal Grandfather     Other Maternal Grandfather         histoplasmosis    Obesity Paternal Grandmother     Diabetes Paternal Grandfather     Kidney Disease Paternal Grandfather     Other Daughter         Tourettes, Lidia Tabes syndrome       Social History  Social History     Socioeconomic History    Marital status:      Spouse name: Mr. 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 Dorian Duarte, Her , [phone]    AMBULATES: uses a wheel chair when out, walks at home independently    DOMICILED: has stairs in her home to the [facility] and washer which she does not use     Social Determinants of Health     Financial Resource Strain: Not on file   Food Insecurity: Not on file   Transportation Needs: Not on file   Physical Activity: Not on file   Stress: Not on file   Social Connections: Not on file   Intimate Partner Violence: Not on file   Housing Stability: Not on file         Review of Systems:  History obtained from chart review and the patient  General ROS: No fever or chills  Respiratory ROS: No cough, shortness of breath, wheezing  Cardiovascular ROS: No chest pain or palpitations  Gastrointestinal ROS: No abdominal pain or melena  Genito-Urinary ROS: No dysuria or hematuria  Musculoskeletal ROS: No joint pain or swelling         Objective:  Patient Vitals for the past 24 hrs:   BP Temp Temp src Pulse Resp SpO2   10/26/22 1033 115/66 98.5 °F (36.9 °C) Axillary 87 14 97 %   10/26/22 0648 (!) 116/50 98.2 °F (36.8 °C) Oral 83 18 98 %   10/26/22 0052 (!) 108/44 98.2 °F (36.8 °C) Oral 88 18 100 %   10/25/22 1813 116/61 98.4 °F (36.9 °C) Axillary 97 16 97 %       Intake/Output Summary (Last 24 hours) at 10/26/2022 1217  Last data filed at 10/26/2022 1034  Gross per 24 hour   Intake 1180 ml   Output --   Net 1180 ml     General: awake/alert   Chest:  clear to auscultation bilaterally  CVS: regular rate and rhythm  Abdominal: soft, nontender, normal bowel sounds  Extremities: no cyanosis or edema  Skin: warm and dry without rash    Labs:  BMP:   Recent Labs     10/24/22  0328 10/25/22  0327 10/26/22  0215    132* 138   K 3.7 3.7 4.1   CL 93* 89* 94*   CO2 25 24 31*   BUN 37* 44* 30*   CREATININE 7.3* 8.6* 5.8*   CALCIUM 10.6* 10.9* 10.8*     CBC:   Recent Labs     10/24/22  0328 10/25/22  0327 10/26/22  0215   WBC 12.7* 13.5* 11.1*   HGB 8.2* 8.2* 8.3*   HCT 25.7* 24.8* 26.1*   .7* 109.7* 111.5*    232 232     LIVER PROFILE:   Recent Labs     10/24/22  0328   AST 11   ALT 7   BILITOT <0.2   ALKPHOS 78     PT/INR: No results for input(s): PROTIME, INR in the last 72 hours. APTT: No results for input(s): APTT in the last 72 hours. BNP:  No results for input(s): BNP in the last 72 hours. Ionized Calcium:No results for input(s): IONCA in the last 72 hours. Magnesium:No results for input(s): MG in the last 72 hours. Phosphorus:No results for input(s): PHOS in the last 72 hours. HgbA1C: No results for input(s): LABA1C in the last 72 hours. Hepatic:   Recent Labs     10/24/22  0328   ALKPHOS 78   ALT 7   AST 11   PROT 6.3*   BILITOT <0.2   LABALBU 4.1     Lactic Acid:   Recent Labs     10/24/22  0328   LACTA 1.3     Troponin: No results for input(s): CKTOTAL, CKMB, TROPONINT in the last 72 hours. ABGs: No results found for: PHART, PO2ART, IAR4DHB  CRP:  No results for input(s): CRP in the last 72 hours. Sed Rate:  No results for input(s): SEDRATE in the last 72 hours. Culture Results:   Blood Culture Recent:   Recent Labs     10/21/22  6863   BC No Growth to date.   Any change in status will be called. Urine Culture Recent : No results for input(s): LABURIN in the last 720 hours. Radiology reports as per the Radiologist  Radiology: CT ABDOMEN PELVIS WO CONTRAST Additional Contrast? None    Result Date: 10/21/2022  Patient: Vivien Chu  Time Out: 23:25Exam(s): CT ABDOMEN + PELVIS Without Contrast EXAM:  CT Abdomen and Pelvis Without Intravenous ContrastCLINICAL HISTORY:   Reason for exam: abdominal pain. TECHNIQUE:  Axial computed tomography images of the abdomen and pelvis without intravenous contrast.COMPARISON:Comparison made to prior CT scan of abdomen pelvis from September 11, 2022. FINDINGS:  Lung bases:  Unremarkable. No mass. No consolidation. ABDOMEN:  Liver:  Unremarkable. Gallbladder and bile ducts: Status post cholecystectomy. No ductal dilation. Pancreas:  Unremarkable. No ductal dilation. Spleen:  Unremarkable. No splenomegaly. Adrenals:  Unremarkable. No mass. Kidneys and ureters: Advanced atrophy. No obstructing stones. No hydronephrosis. Stomach and bowel: There is thickening and edema of the distal transverse, descending, and sigmoid colon wall registered into the rectal wall with mild inflammation of the surrounding fat. Submucosal fat halo sign in the right colon. PELVIS:  Appendix:  No findings to suggest acute appendicitis. Bladder:  Unremarkable. No stones. Reproductive: Status post hysterectomy. ABDOMEN and PELVIS:  Intraperitoneal space:  Unremarkable. No free air. No significant fluid collection. Bones/joints:  Remote superior endplate fracture of the L2 vertebral body. No acute fracture. No dislocation. Soft tissues:  Unremarkable. Vasculature: IVC filter in place. No abdominal aortic aneurysm. Lymph nodes:  Unremarkable. No enlarged lymph nodes. Findings concerning for colitis of the transverse, descending and sigmoid colon with proctitis, which may be of infectious or inflammatory etiologies. Electronically signed by Jhonny Cheadle, MD on 10-21-22 at 2325    XR ACUTE ABD SERIES CHEST 1 VW    Result Date: 10/21/2022  NO PRIOR REPORT AVAILABLE Exam: ACUTE ABDOMINAL SERIES Clinical data: Constipation, abdominal discomfort. Technique: Two views of the abdomen with frontal view of the chest. Prior studies: CT scan of abdomen and pelvis dated 9/11/22. Radiograph of chest dated 9/23/22. Findings: Supine and upright views of the abdomen, as well as an AP chest. The lungs are grossly clear. Cardiac silhouette is within normal limits. Air in loops of nondistended small bowel and colon with a nonspecific, nonobstructive bowel gas pattern. No evidence of small bowel obstruction or intraperitoneal free air. Liver appears enlarged in size. No acute osseous abnormality is detected. Surgical sutures seen in right upper quadrant. Mild degenerative spine. Multiple phleboliths seen in pelvis. IVC filter at the L3-L4 level. 1. Nonspecific nonobstructive bowel gas pattern. Liver appears enlarged in size 2. No adverse interval change. Recommendation: Follow up as clinically indicated.  Electronically Signed by Kavya Phillip MD at 21-Oct-2022 10:56:30 PM EST                Assessment   End-stage renal disease  Diabetes type 2 with diabetic nephropathy  Anemia and chronic kidney disease  Acute bacterial colitis  Constipation  Morbid obesity  Hyperphosphatemia  Secondary hyperparathyroidism      Plan:  Discussed with patient, nursing  Work-up reviewed to date  Monitor labs  Continue phosphorus binders with Renvela per the hospital formulary  Bowel regimen and IV antibiotics per primary service    Radha Duarte MD  10/26/22  12:17 PM

## 2022-10-26 NOTE — TELEPHONE ENCOUNTER
Caller: NATE AT [facility] PHYSICAL [facility]ERAPY    Relationship to patient: [facility]    Best call back number: [phone]    Patient is needing: WANTED TO SEND FYI TO OFFICE STATING THAT PATIENT HAD REFUSED APPOINTMENT FOR PHYSICAL THERAPY WITHIN ALLOTTED TIME AND OPTED FOR AN APPOINTMENT ON Monday. NATE STATES SHE WILL BE PUTTING IN ANOTHER ORDER.

## 2022-10-26 NOTE — DISCHARGE SUMMARY
[place] [address]  DEPARTMENT OF HOSPITALIST MEDICINE    DISCHARGE SUMMARY:        Adithya Yarbrough  :  1960  MRN:  [id number]    Admit date:  10/21/2022  Discharge date: 10/26/2022      Admitting Physician:  No admitting provider for patient encounter. Advance Directive: Full Code    Consults Made:   IP CONSULT TO NEPHROLOGY  IP CONSULT TO GI  IP CONSULT TO HOME CARE NEEDS      Primary Care Physician:  Vira Ly MD    Discharge Diagnoses:  Principal Problem:    Sepsis [facility])  Active Problems:    Colitis    ESRD on dialysis ([facility]    Morbid obesity ([facility] [address]    Sleep apnea    Type 2 DM with CKD stage 3 and hypertension ([facility] [address]    Hypertriglyceridemia    Deep vein thrombosis of lower extremity (HCC)    Hypertension  Resolved Problems:    * No resolved hospital problems. *          Pertinent Labs:  CBC with DIFF:  Recent Labs     10/24/22  0328 10/25/22  0327 10/26/22  0215   WBC 12.7* 13.5* 11.1*   RBC 2.30* 2.26* 2.34*   HGB 8.2* 8.2* 8.3*   HCT 25.7* 24.8* 26.1*   .7* 109.7* 111.5*   MCH 35.7* 36.3* 35.5*   MCHC 31.9* 33.1 31.8*   RDW 15.9* 15.7* 15.8*    232 232   MPV 9.4 9.7 9.5   NEUTOPHILPCT 70.1* 73.6* 68.9*   LYMPHOPCT 16.7* 16.1* 17.4*   MONOPCT 6.5 5.1 7.2   EOSRELPCT 3.9 2.7 2.4   BASOPCT 0.5 0.4 0.5   NEUTROABS 8.9* 9.9* 7.6*   LYMPHSABS 2.1 2.2 1.9   MONOSABS 0.80 0.70 0.80   EOSABS 0.50 0.40 0.30   BASOSABS 0.10 0.10 0.10       CMP/BMP:  Recent Labs     10/24/22  0328 10/25/22  0327 10/26/22  0215    132* 138   K 3.7 3.7 4.1   CL 93* 89* 94*   CO2 25 24 31*   ANIONGAP 18 19 13   GLUCOSE 67* 127* 82   BUN 37* 44* 30*   CREATININE 7.3* 8.6* 5.8*   LABGLOM 6* 5* 8*   CALCIUM 10.6* 10.9* 10.8*   PROT 6.3*  --   --    LABALBU 4.1  --   --    BILITOT <0.2  --   --    ALKPHOS 78  --   --    ALT 7  --   --    AST 11  --   --          CRP:  No results for input(s): CRP in the last 72 hours. Sed Rate:  No results for input(s): SEDRATE in the last 72 hours.       HgBA1c: No components found for: HGBA1C  FLP:    Lab Results   Component Value Date/Time    TRIG 282 01/09/2013 10:44 AM    HDL 35 01/09/2013 10:44 AM    LDLCALC ND 09/08/2011 02:00 PM    LDLDIRECT 81 01/09/2013 10:44 AM     TSH:    Lab Results   Component Value Date/Time    TSH 2.710 06/25/2022 02:23 PM     Troponin T:   Recent Labs     10/25/22  0327   TROPONINI 0.01     Pro-BNP: No results for input(s): BNP in the last 72 hours. INR: No results for input(s): INR in the last 72 hours. ABGs: No results found for: PHART, PO2ART, EYP1ICY  UA:No results for input(s): NITRITE, COLORU, PHUR, LABCAST, WBCUA, RBCUA, MUCUS, TRICHOMONAS, YEAST, BACTERIA, CLARITYU, SPECGRAV, LEUKOCYTESUR, UROBILINOGEN, BILIRUBINUR, BLOODU, GLUCOSEU, AMORPHOUS in the last 72 hours. Invalid input(s): Beryle Sandman      Culture Results:    No results for input(s): CXSURG in the last 720 hours. Blood Culture Recent:   Recent Labs     10/21/22  2253   BC No Growth to date. Any change in status will be called. Cultures:   No results for input(s): CULTURE in the last 72 hours. No results for input(s): BC, Vernell Dage in the last 72 hours. No results for input(s): CXSURG in the last 72 hours. No results for input(s): MG, PHOS in the last 72 hours. Recent Labs     10/24/22  0328   AST 11   ALT 7   BILITOT <0.2   ALKPHOS 78           Significant Diagnostic Studies:   CT ABDOMEN PELVIS WO CONTRAST Additional Contrast? None    Result Date: 10/21/2022  Patient: Carli Larios  Time Out: 23:25Exam(s): CT ABDOMEN + PELVIS Without Contrast EXAM:  CT Abdomen and Pelvis Without Intravenous ContrastCLINICAL HISTORY:   Reason for exam: abdominal pain. TECHNIQUE:  Axial computed tomography images of the abdomen and pelvis without intravenous contrast.COMPARISON:Comparison made to prior CT scan of abdomen pelvis from September 11, 2022. FINDINGS:  Lung bases:  Unremarkable. No mass. No consolidation. ABDOMEN:  Liver:  Unremarkable.   Gallbladder and bile ducts: Status post cholecystectomy. No ductal dilation. Pancreas:  Unremarkable. No ductal dilation. Spleen:  Unremarkable. No splenomegaly. Adrenals:  Unremarkable. No mass. Kidneys and ureters: Advanced atrophy. No obstructing stones. No hydronephrosis. Stomach and bowel: There is thickening and edema of the distal transverse, descending, and sigmoid colon wall registered into the rectal wall with mild inflammation of the surrounding fat. Submucosal fat halo sign in the right colon. PELVIS:  Appendix:  No findings to suggest acute appendicitis. Bladder:  Unremarkable. No stones. Reproductive: Status post hysterectomy. ABDOMEN and PELVIS:  Intraperitoneal space:  Unremarkable. No free air. No significant fluid collection. Bones/joints:  Remote superior endplate fracture of the L2 vertebral body. No acute fracture. No dislocation. Soft tissues:  Unremarkable. Vasculature: IVC filter in place. No abdominal aortic aneurysm. Lymph nodes:  Unremarkable. No enlarged lymph nodes. Findings concerning for colitis of the transverse, descending and sigmoid colon with proctitis, which may be of infectious or inflammatory etiologies. Electronically signed by Tammi Shirley MD on 10-21-22 at 2325    XR ACUTE ABD SERIES CHEST 1 VW    Result Date: 10/21/2022  NO PRIOR REPORT AVAILABLE Exam: ACUTE ABDOMINAL SERIES Clinical data: Constipation, abdominal discomfort. Technique: Two views of the abdomen with frontal view of the chest. Prior studies: CT scan of abdomen and pelvis dated 9/11/22. Radiograph of chest dated 9/23/22. Findings: Supine and upright views of the abdomen, as well as an AP chest. The lungs are grossly clear. Cardiac silhouette is within normal limits. Air in loops of nondistended small bowel and colon with a nonspecific, nonobstructive bowel gas pattern. No evidence of small bowel obstruction or intraperitoneal free air. Liver appears enlarged in size.  No acute osseous abnormality is detected. Surgical sutures seen in right upper quadrant. Mild degenerative spine. Multiple phleboliths seen in pelvis. IVC filter at the L3-L4 level. 1. Nonspecific nonobstructive bowel gas pattern. Liver appears enlarged in size 2. No adverse interval change. Recommendation: Follow up as clinically indicated. Electronically Signed by Virginia Johnson MD at 21-Oct-2022 10:56:30 PM EST                   Hospital Course:   Ms. Faraz Hoff, a 60-year-old female, history of CHF, CKD, HLD, HTN, GERI, DMT2, ESRD (on HD) presented to [facility] ED (10/21/2022), on account of an acute onset of nausea & 4-day history of constipation. Patient admitted to [facility] (10/21/2022), further work-up and management. Sepsis  Colitis  Leukocytosis with a WBC of 21.4 (10/21/2022)  CT abdomen pelvis (10/21/2022): Findings concerning for colitis of the transverse, descending and sigmoid colon with proctitis, which may be of infectious or inflammatory etiologies  Antibiotic: Ciprofloxacin and Metronidazole IV while inpatient. Switch to oral to complete antibiotic course. Lactic acid of 3.7 --> 2.4  --> 1.7 --> 1.3 (10/24/2022)  IV fluids  Followed by GI - appreciate recommendations  Continued symptomatic and supportive management including pain management as needed. ESRD, on scheduled HD   Nephrology consulted on admission  Continued scheduled HD as per nephrology rec      Diabetes Mellitus II, with hyperglycemia  Uncontrolled  Inpatient Regimens to included;  - Insulin Glargine (Lantus) 15 units subcu nightly  - Insulin Lispro (Humalog) 4 units subcu pre-meal 3 times a day  - Insulin Lispro (Humalog) on a High dose sliding scale  Monitored POC glucose, and adjusted insulin regimen accordingly based on daily insulin requirement.         Hypothyroidism  Levothyroxine 88 mcg p.o. daily  TSH level: 1.1 (10/22/2022)     Continued management of other chronic medical conditions         Physical Exam:  Vital Signs: /66   Pulse 87 Temp 98.5 °F (36.9 °C) (Axillary)   Resp 14   Ht 5' 5\" (1.651 m)   Wt (!) 315 lb 4.1 oz (143 kg)   LMP 01/01/2000   SpO2 97%   BMI 52.46 kg/m²   Physical Exam  Vitals and nursing note reviewed. Constitutional:       General: She is not in acute distress. Appearance: Normal appearance. She is obese. She is not ill-appearing, toxic-appearing or diaphoretic. HENT:      Head: Normocephalic and atraumatic. Right Ear: External ear normal.      Left Ear: External ear normal.      Nose: Nose normal. No congestion or rhinorrhea. Mouth/Throat:      Mouth: Mucous membranes are moist.      Pharynx: Oropharynx is clear. No oropharyngeal exudate or posterior oropharyngeal erythema. Eyes:      General: No scleral icterus. Right eye: No discharge. Left eye: No discharge. Extraocular Movements: Extraocular movements intact. Conjunctiva/sclera: Conjunctivae normal.      Pupils: Pupils are equal, round, and reactive to light. Cardiovascular:      Rate and Rhythm: Normal rate and regular rhythm. Pulses: Normal pulses. Heart sounds: Normal heart sounds. No murmur heard. No friction rub. No gallop. Pulmonary:      Effort: Pulmonary effort is normal. No respiratory distress. Breath sounds: Normal breath sounds. No stridor. No wheezing, rhonchi or rales. Chest:      Chest wall: No tenderness. Abdominal:      General: Bowel sounds are normal. There is no distension. Palpations: Abdomen is soft. Tenderness: There is no abdominal tenderness. There is no guarding or rebound. Musculoskeletal:         General: No swelling, tenderness, deformity or signs of injury. Normal range of motion. Cervical back: Normal range of motion and neck supple. No rigidity. No muscular tenderness. Right lower leg: No edema. Left lower leg: No edema. Skin:     General: Skin is warm and dry. Capillary Refill: Capillary refill takes less than 2 seconds. Coloration: Skin is not jaundiced or pale. Findings: No bruising, erythema, lesion or rash. Neurological:      General: No focal deficit present. Mental Status: She is alert and oriented to person, place, and time. Cranial Nerves: No cranial nerve deficit. Sensory: No sensory deficit. Motor: No weakness. Coordination: Coordination normal.   Psychiatric:         Mood and Affect: Mood normal.         Behavior: Behavior normal.         Thought Content:  Thought content normal.         Judgment: Judgment normal.         Discharge Medications:        Medication List        START taking these medications      ciprofloxacin 500 MG tablet  Commonly known as: CIPRO  Take 0.5 tablets by mouth 2 times daily for 5 days     docusate sodium 100 MG capsule  Commonly known as: Colace  Take 1 capsule by mouth 2 times daily     metroNIDAZOLE 500 MG tablet  Commonly known as: FLAGYL  Take 1 tablet by mouth 3 times daily for 5 days            CONTINUE taking these medications      allopurinol 100 MG tablet  Commonly known as: ZYLOPRIM     aspirin 81 MG tablet     buPROPion 150 MG extended release tablet  Commonly known as: WELLBUTRIN SR     busPIRone 10 MG tablet  Commonly known as: BUSPAR     calcitRIOL 0.5 MCG capsule  Commonly known as: ROCALTROL     carvedilol 6.25 MG tablet  Commonly known as: COREG     cetirizine 10 MG tablet  Commonly known as: ZYRTEC     citalopram 40 MG tablet  Commonly known as: CELEXA     diazePAM 2 MG tablet  Commonly known as: VALIUM     epoetin jose ramon 40712 UNIT/ML injection  Commonly known as: EPOGEN;PROCRIT     fluticasone 50 MCG/ACT nasal spray  Commonly known as: FLONASE     HUMULIN R U-500 KWIKPEN SC     lamoTRIgine 100 MG tablet  Commonly known as: LAMICTAL     levothyroxine 88 MCG tablet  Commonly known as: SYNTHROID     ondansetron 4 MG disintegrating tablet  Commonly known as: Zofran ODT  Take 1 tablet by mouth every 8 hours as needed for Nausea or Vomiting Ozempic (0.25 or 0.5 MG/DOSE) 2 MG/1.5ML Sopn  Generic drug: Semaglutide(0.25 or 0.5MG/DOS)     potassium chloride 20 MEQ packet  Commonly known as: KLOR-CON     REPATHA SC     rosuvastatin 20 MG tablet  Commonly known as: CRESTOR     Velphoro 500 MG Chew  Generic drug: Sucroferric Oxyhydroxide     vitamin C 500 MG tablet  Commonly known as: ASCORBIC ACID            ASK your doctor about these medications      fleet 7-19 GM/118ML  Place 1 enema rectally nightly for 3 days  Ask about: Should I take this medication? Where to Get Your Medications        These medications were sent to [facility]  [facility]D - P [phone] [phone]  [address] [place] [address],[address], [address] [facility] [address]      Phone: [phone]   ciprofloxacin 500 MG tablet  docusate sodium 100 MG capsule  fleet 7-19 GM/118ML  metroNIDAZOLE 500 MG tablet           Discharge Instructions: Follow up with Guillermina Staples MD in 7 days. Take medications as directed. Resume activity as tolerated. Recommended Follow Up:  Guillermina Staples MD  [facility] [place] [phone]    Schedule an appointment as soon as possible for a visit on 10/31/2022  [address] [facility] discharge 10/31/2022 @ 10:30 am      Followup Appointments Scheduled at Time of Discharge:  No future appointments. Diet: ADULT DIET; Regular; 4 carb choices (60 gm/meal); Low Fat/Low Chol/High Fiber/2 gm Na; Low Sodium (2 gm); Low Potassium (Less than 3000 mg/day);  Low Phosphorus (Less than 1000 mg); 1000 ml        DISCHARGE STATUS:    Condition on Discharge: stable    Disposition: Patient is medically stable and will be discharged Home with [facility]      Extended Emergency Contact Information  Primary Emergency Contact: Arcelia Herrera  Address: [address] [place] [address] Phone: [phone]  Mobile Phone: [phone]  Relation: Spouse         Time Spent on discharge is   35 mins  in the examination, evaluation, counseling and review of medications and discharge plan. Electronically signed by   Karin Dawkins MD, MPH, MD,   Internal Medicine Hospitalist   10/26/2022 12:46 PM      Thank you Becki Prasad MD for the opportunity to be involved in this patient's care. If you have any questions or concerns please feel free to contact me at [phone]        EMR Dragon/Transcription disclaimer:   Much of this encounter note is an electronic transcription/translation of spoken language to printed text.  The electronic translation of spoken language may permit erroneous, or at times, nonsensical words or phrases to be inadvertently transcribed; although attempts have made to review the note for such errors, some may still exist.

## 2022-10-27 ENCOUNTER — CARE COORDINATION (OUTPATIENT)
Dept: CASE MANAGEMENT | Age: 62
End: 2022-10-27

## 2022-10-27 LAB
BLOOD CULTURE, ROUTINE: NORMAL
CULTURE, BLOOD 2: NORMAL

## 2022-10-27 NOTE — CARE COORDINATION
[facility] Care Transitions Initial Follow Up Call    Call within 2 business days of discharge: Yes    Care Transition Nurse contacted the patient by telephone to perform post hospital discharge assessment. Verified name and  with patient as identifiers. Provided introduction to self, and explanation of the Care Transition Nurse role. Patient: Nikki Lebron Patient : 1960   MRN: [id number]  Reason for Admission: Colitis, et al  Discharge Date: 10/26/22 RARS: Readmission Risk Score: 20      Last Discharge 30 [facility]       Date Complaint Diagnosis Description Type Department Provider    10/21/22 Constipation Colitis . .. ED to Hosp-Admission (Discharged) (ADMITTED) L MED SURG Yamilet So MD; Aj Samaniego. .. Was this an external facility discharge? No     Challenges to be reviewed by the provider   Additional needs identified to be addressed with provider: No  none         Method of communication with provider: none. Care Transition Nurse reviewed discharge instructions, medical action plan, and red flags with patient who verbalized understanding. The patient was given an opportunity to ask questions and does not have any further questions or concerns at this time. Were discharge instructions available to patient? Yes. Reviewed appropriate site of care based on symptoms and resources available to patient including: PCP  Specialist  Urgent care clinics  [address]   When to call [address] The patient agrees to contact the PCP office for questions related to their healthcare. Advance Care Planning:   Does patient have an Advance Directive: not on file; education provided. Medication reconciliation was performed with patient, who verbalizes understanding of administration of home medications.  Medications reviewed, 1111F entered: N/A    Non-face-to-face services provided:  Reviewed encounter information for continuity of care prior to follow up Care Transitions Coordination phone call - chart notes, consults, progress notes, test results, med list, appointments, AVS, other information. Offered patient enrollment in the Remote Patient Monitoring (RPM) program for in-home monitoring: NA.    Care Transitions 24 Hour Call    Schedule Follow Up Appointment with PCP: Completed  Do you have a copy of your discharge instructions?: Yes  Do you have all of your prescriptions and are they filled?: Yes  Have you been contacted by a [facility] Pharmacist?: No  Have you scheduled your follow up appointment?: Yes  How are you going to get to your appointment?: Car - family or friend to transport  Do you feel like you have everything you need to keep you well at home?: Yes  Care Transitions Interventions         Follow Up  Dr. Alpesh Montenegro, 10/31/2022, 10:30 am    Spoke with patient for an initial follow up call following discharge from the hospital.  She reported that she is doing fair overall. She said that she is having some issues that she said is not really dizziness, but she was not really sure what to call it. She said that when she first stands up, she is ok, but after she walks and gets a few steps away, she gets very weak in her legs and loses her balance. She said it happened twice this morning, but does not happen all the time. She said she and her  had gone out to [facility] for breakfast and it happened twice. She said that she does have a way to check her blood pressure and heart rate. I did talk her through the procedure of checking sitting and standing and what to watch for and to report to her PCP. We also discussed orthostatic hypotension and falling precautions. She said she would do this and keep a log and report any unusual findings to her PCP and take the log with her when she goes for her follow up on Monday. She said she is still having some loose stools, but this is improving.   Discussed ways to help this while staying within her renal diet, as well as

## 2022-10-27 NOTE — CARE COORDINATION
Set up with [facility]  [phone].    Electronically signed by Ciarra Tellez on 10/27/22 at 9:22 AM CDT

## 2022-10-31 ENCOUNTER — TELEPHONE (OUTPATIENT)
Dept: FAMILY MEDICINE CLINIC | Facility: CLINIC | Age: 62
End: 2022-10-31

## 2022-10-31 ENCOUNTER — OFFICE VISIT (OUTPATIENT)
Dept: FAMILY MEDICINE CLINIC | Facility: CLINIC | Age: 62
End: 2022-10-31

## 2022-10-31 ENCOUNTER — LAB (OUTPATIENT)
Dept: LAB | Facility: HOSPITAL | Age: 62
End: 2022-10-31

## 2022-10-31 VITALS
HEART RATE: 89 BPM | BODY MASS INDEX: 48.82 KG/M2 | WEIGHT: 293 LBS | HEIGHT: 65 IN | SYSTOLIC BLOOD PRESSURE: 108 MMHG | OXYGEN SATURATION: 100 % | DIASTOLIC BLOOD PRESSURE: 69 MMHG

## 2022-10-31 DIAGNOSIS — Z09 HOSPITAL DISCHARGE FOLLOW-UP: ICD-10-CM

## 2022-10-31 DIAGNOSIS — I12.9 BENIGN HYPERTENSION WITH CHRONIC KIDNEY DISEASE, STAGE IV: ICD-10-CM

## 2022-10-31 DIAGNOSIS — E11.22 TYPE 2 DIABETES MELLITUS WITH STAGE 3 CHRONIC KIDNEY DISEASE, WITH LONG-TERM CURRENT USE OF INSULIN, UNSPECIFIED WHETHER STAGE 3A OR 3B CKD: ICD-10-CM

## 2022-10-31 DIAGNOSIS — K21.9 GASTROESOPHAGEAL REFLUX DISEASE WITHOUT ESOPHAGITIS: ICD-10-CM

## 2022-10-31 DIAGNOSIS — D50.9 IRON DEFICIENCY ANEMIA, UNSPECIFIED IRON DEFICIENCY ANEMIA TYPE: ICD-10-CM

## 2022-10-31 DIAGNOSIS — E78.1 HYPERTRIGLYCERIDEMIA: ICD-10-CM

## 2022-10-31 DIAGNOSIS — K52.9 COLITIS: ICD-10-CM

## 2022-10-31 DIAGNOSIS — K59.00 CONSTIPATION, UNSPECIFIED CONSTIPATION TYPE: Primary | ICD-10-CM

## 2022-10-31 DIAGNOSIS — N18.30 TYPE 2 DIABETES MELLITUS WITH STAGE 3 CHRONIC KIDNEY DISEASE, WITH LONG-TERM CURRENT USE OF INSULIN, UNSPECIFIED WHETHER STAGE 3A OR 3B CKD: ICD-10-CM

## 2022-10-31 DIAGNOSIS — E55.9 VITAMIN D DEFICIENCY: ICD-10-CM

## 2022-10-31 DIAGNOSIS — Z79.4 TYPE 2 DIABETES MELLITUS WITH STAGE 3 CHRONIC KIDNEY DISEASE, WITH LONG-TERM CURRENT USE OF INSULIN, UNSPECIFIED WHETHER STAGE 3A OR 3B CKD: ICD-10-CM

## 2022-10-31 DIAGNOSIS — I10 ESSENTIAL HYPERTENSION: ICD-10-CM

## 2022-10-31 DIAGNOSIS — N18.4 BENIGN HYPERTENSION WITH CHRONIC KIDNEY DISEASE, STAGE IV: ICD-10-CM

## 2022-10-31 DIAGNOSIS — K76.9 DISEASE OF LIVER: ICD-10-CM

## 2022-10-31 DIAGNOSIS — R53.83 OTHER FATIGUE: ICD-10-CM

## 2022-10-31 DIAGNOSIS — F41.9 ANXIETY: Primary | ICD-10-CM

## 2022-10-31 LAB
ALBUMIN SERPL-MCNC: 4.4 G/DL (ref 3.5–5)
ALBUMIN/GLOB SERPL: 1.2 G/DL (ref 1.1–2.5)
ALP SERPL-CCNC: 98 U/L (ref 24–120)
ALT SERPL W P-5'-P-CCNC: 18 U/L (ref 0–35)
ANION GAP SERPL CALCULATED.3IONS-SCNC: 18 MMOL/L (ref 4–13)
AST SERPL-CCNC: 24 U/L (ref 7–45)
AUTO MIXED CELLS #: 0.6 10*3/MM3 (ref 0.1–2.6)
AUTO MIXED CELLS %: 5 % (ref 0.1–24)
BILIRUB SERPL-MCNC: 0.5 MG/DL (ref 0.1–1)
BUN SERPL-MCNC: 42 MG/DL (ref 5–21)
BUN/CREAT SERPL: 5.4
CALCIUM SPEC-SCNC: 10.4 MG/DL (ref 8.4–10.4)
CHLORIDE SERPL-SCNC: 90 MMOL/L (ref 98–110)
CO2 SERPL-SCNC: 30 MMOL/L (ref 24–31)
CREAT SERPL-MCNC: 7.78 MG/DL (ref 0.5–1.4)
EGFRCR SERPLBLD CKD-EPI 2021: 5.4 ML/MIN/1.73
ERYTHROCYTE [DISTWIDTH] IN BLOOD BY AUTOMATED COUNT: 16.1 % (ref 12.3–15.4)
GLOBULIN UR ELPH-MCNC: 3.7 GM/DL
GLUCOSE SERPL-MCNC: 131 MG/DL (ref 70–100)
HCT VFR BLD AUTO: 27.2 % (ref 34–46.6)
HGB BLD-MCNC: 8.9 G/DL (ref 12–15.9)
LYMPHOCYTES # BLD AUTO: 2.1 10*3/MM3 (ref 0.7–3.1)
LYMPHOCYTES NFR BLD AUTO: 17.4 % (ref 19.6–45.3)
MCH RBC QN AUTO: 35.7 PG (ref 26.6–33)
MCHC RBC AUTO-ENTMCNC: 32.7 G/DL (ref 31.5–35.7)
MCV RBC AUTO: 109.2 FL (ref 79–97)
NEUTROPHILS NFR BLD AUTO: 77.6 % (ref 42.7–76)
NEUTROPHILS NFR BLD AUTO: 9.2 10*3/MM3 (ref 1.7–7)
PLATELET # BLD AUTO: 304 10*3/MM3 (ref 140–450)
PMV BLD AUTO: 7.9 FL (ref 6–12)
POTASSIUM SERPL-SCNC: 4.3 MMOL/L (ref 3.5–5.3)
PROT SERPL-MCNC: 8.1 G/DL (ref 6.3–8.7)
RBC # BLD AUTO: 2.49 10*6/MM3 (ref 3.77–5.28)
SODIUM SERPL-SCNC: 138 MMOL/L (ref 135–145)
TSH SERPL DL<=0.05 MIU/L-ACNC: 4.7 UIU/ML (ref 0.27–4.2)
WBC NRBC COR # BLD: 11.9 10*3/MM3 (ref 3.4–10.8)

## 2022-10-31 PROCEDURE — 84443 ASSAY THYROID STIM HORMONE: CPT

## 2022-10-31 PROCEDURE — 36415 COLL VENOUS BLD VENIPUNCTURE: CPT

## 2022-10-31 PROCEDURE — 85025 COMPLETE CBC W/AUTO DIFF WBC: CPT

## 2022-10-31 PROCEDURE — 80053 COMPREHEN METABOLIC PANEL: CPT

## 2022-10-31 PROCEDURE — 1111F DSCHRG MED/CURRENT MED MERGE: CPT

## 2022-10-31 PROCEDURE — 99496 TRANSJ CARE MGMT HIGH F2F 7D: CPT

## 2022-10-31 RX ORDER — SEVELAMER CARBONATE 800 MG/1
TABLET, FILM COATED ORAL
COMMUNITY
Start: 2022-10-21

## 2022-10-31 RX ORDER — FERRIC MALTOL 30 MG/1
30 CAPSULE ORAL 2 TIMES DAILY
Qty: 60 CAPSULE | Refills: 0 | Status: ON HOLD | OUTPATIENT
Start: 2022-10-31 | End: 2022-11-28

## 2022-10-31 RX ORDER — METRONIDAZOLE 500 MG/1
TABLET ORAL
Status: ON HOLD | COMMUNITY
Start: 2022-10-26 | End: 2022-11-28

## 2022-10-31 RX ORDER — DIAZEPAM 5 MG/1
5 TABLET ORAL AS NEEDED
Qty: 30 TABLET | Refills: 0 | Status: SHIPPED | OUTPATIENT
Start: 2022-10-31

## 2022-10-31 RX ORDER — CIPROFLOXACIN 500 MG/1
TABLET, FILM COATED ORAL
Status: ON HOLD | COMMUNITY
Start: 2022-10-26 | End: 2022-11-28

## 2022-10-31 NOTE — TELEPHONE ENCOUNTER
----- Message from FLOR Arellano sent at 10/31/2022  1:41 PM CDT -----  Please let patient know that labs show  CMP: worsening kidney function with a GFR down to 5.4. Please see when patient is following up with Dr. Davis. If this is not soon, we need to call and let them know and see if we can get her seen ASAP.     CBC: hgb remains low, we will trial accrufer to see if this will help with anemia and symptoms.     Pt contacted office and was transferred from the hub to this lpn. Verified pt name and . Informed of all above. Pt vu of all. Pt stated she wanted to make sure office was aware she was on home dialysis. Pt stated she has follow up appt with Dr. Davis this coming month. Faxed cbc and cmp to Dr. Davis's office.

## 2022-10-31 NOTE — PROGRESS NOTES
Transitional Care Follow Up Visit  Subjective     Maria C Shrestha is a 62 y.o. female who presents for a transitional care management visit.    Within 48 business hours after discharge our office contacted her via telephone to coordinate her care and needs.      I reviewed and discussed the details of that call along with the discharge summary, hospital problems, inpatient lab results, inpatient diagnostic studies, and consultation reports with Maria C.     Current outpatient and discharge medications have been reconciled for the patient.  Reviewed by: FLOR Arellano      No flowsheet data found.  Risk for Readmission (LACE)14   History of Present Illness  Pt is here today for Hospital f/u 10/26 Sepsis, colitis and constipation      Course During Hospital Stay:  6     The following portions of the patient's history were reviewed and updated as appropriate: allergies, current medications, past family history, past medical history, past social history, past surgical history and problem list.    Review of Systems   Constitutional: Positive for fatigue.   All other systems reviewed and are negative.      MILLICENT:    PHQ-2 Total Score:    PHQ-9 Total Score:      Objective   Physical Exam  Constitutional:       Appearance: Normal appearance. She is well-developed. She is obese. She is ill-appearing.   HENT:      Head: Normocephalic and atraumatic.      Right Ear: Tympanic membrane, ear canal and external ear normal.      Left Ear: Tympanic membrane, ear canal and external ear normal.      Nose: Nose normal. No septal deviation, nasal tenderness or congestion.      Mouth/Throat:      Lips: Pink. No lesions.      Mouth: Mucous membranes are moist. No oral lesions.      Dentition: Normal dentition.      Pharynx: Oropharynx is clear. No pharyngeal swelling, oropharyngeal exudate or posterior oropharyngeal erythema.   Eyes:      General: Lids are normal. Vision grossly intact. No scleral icterus.        Right eye: No discharge.          Left eye: No discharge.      Extraocular Movements: Extraocular movements intact.      Conjunctiva/sclera: Conjunctivae normal.      Right eye: Right conjunctiva is not injected.      Left eye: Left conjunctiva is not injected.      Pupils: Pupils are equal, round, and reactive to light.   Neck:      Thyroid: No thyroid mass.      Trachea: Trachea normal.   Cardiovascular:      Rate and Rhythm: Normal rate and regular rhythm.      Heart sounds: Normal heart sounds. No murmur heard.    No gallop.   Pulmonary:      Effort: Pulmonary effort is normal.      Breath sounds: Normal breath sounds and air entry. No wheezing, rhonchi or rales.   Abdominal:      General: There is no distension.      Palpations: Abdomen is soft. There is no mass.      Tenderness: There is no abdominal tenderness. There is no right CVA tenderness, left CVA tenderness, guarding or rebound.   Musculoskeletal:         General: No tenderness or deformity. Normal range of motion.      Cervical back: Full passive range of motion without pain, normal range of motion and neck supple.      Thoracic back: Normal.      Right lower leg: No edema.      Left lower leg: No edema.   Skin:     General: Skin is warm and dry.      Coloration: Skin is not jaundiced.      Findings: No rash.   Neurological:      Mental Status: She is alert and oriented to person, place, and time.      Cranial Nerves: Cranial nerves are intact.      Sensory: Sensation is intact.      Motor: Motor function is intact.      Coordination: Coordination is intact.      Gait: Gait is intact.      Deep Tendon Reflexes: Reflexes are normal and symmetric.   Psychiatric:         Mood and Affect: Mood and affect normal.         Judgment: Judgment normal.         Assessment & Plan   Problem List Items Addressed This Visit        Cardiac and Vasculature    Essential hypertension    Relevant Orders    Ambulatory Referral to Internal Medicine    Hypertriglyceridemia    Relevant Orders     Ambulatory Referral to Internal Medicine    Benign hypertension with chronic kidney disease, stage IV (HCC)    Relevant Orders    Ambulatory Referral to Internal Medicine       Endocrine and Metabolic    Type 2 diabetes mellitus with stage 3 chronic kidney disease, with long-term current use of insulin (HCC)    Relevant Orders    Ambulatory Referral to Internal Medicine    Vitamin D deficiency    Relevant Orders    Vitamin D 25 hydroxy       Gastrointestinal Abdominal     Disease of liver    Relevant Orders    Ambulatory Referral to Internal Medicine       Hematology and Neoplasia    Anemia    Relevant Medications    Ferric Maltol (ACCRUFeR) 30 MG capsule    Other Relevant Orders    CBC Auto Differential (Completed)    Comprehensive Metabolic Panel (Completed)    TSH   Other Visit Diagnoses     Constipation, unspecified constipation type    -  Primary    Hospital discharge follow-up        Colitis        Other fatigue        Relevant Orders    CBC Auto Differential (Completed)    Comprehensive Metabolic Panel (Completed)    TSH    Vitamin D 25 hydroxy    Vitamin B12    Gastroesophageal reflux disease without esophagitis             Class 3 Severe Obesity (BMI >=40). Obesity-related health conditions include the following: hypertension, diabetes mellitus and dyslipidemias. Obesity is unchanged. BMI is is above average; no BMI management plan is appropriate. We discussed portion control and increasing exercise.    CBC WITH AUTO DIFFERENTIAL (10/26/2022 03:15 EDT)  BASIC METABOLIC PANEL (10/26/2022 03:15 EDT)  CT ABDOMEN PELVIS WO CONTRAST Additional Contrast? None (10/21/2022 23:39 EDT)  XR ACUTE ABD SERIES CHEST 1 VW (10/21/2022 22:31 EDT)    Patient is seen today for hospital follow-up.  Patient presented to the ER with severe constipation.  Patient had tried multiple stool softeners, MiraLAX, enemas with no relief.  Patient became in severe pain and took her self to the ER.  There a CT of her abdomen pelvis was  done which showed constipation along with colitis.  Patient's WBCs were elevated as well.  Patient was treated with IV antibiotics and is still currently on oral antibiotics as well.  Patient did have multiple enemas in the hospital along with manual disimpaction.  Patient states since then she has not been impacted.  Patient does receive dialysis 5 days a week at home as well.  On reviewing patient's lab work today patient was anemic.  Patient states that in the past she has had to have iron replacement but this causes her constipation so she is not been able to take any.  Patient's main complaint today is severe fatigue.  She states that she is also getting dizzy with position changes.  I discussed with her that this could also be due to her iron levels being low.  Patient does follow with Dr. Patel for her diabetes and Dr. Davis for nephrology.  I discussed with her that she would also need to talk with them regarding her dizziness due to recent changes she states they have made to her blood pressure medicines.  Also discussed with patient today that due to her chronic medical conditions it would be best that she followed with internal medicine.  Her  is in the room with her today and states he follows with 1 as well.  Patient is also requesting a increase in her Valium.  She states that during her dialysis and at night she gets severe restless leg syndrome.  She states that the 2 mg had always helped her but it is no longer giving her any relief.  I did talk to Dr. Soliman who agreed to increase this to 5 mg prn. I did recommend patient follow with GI. We will call to see if we can get this patient in sooner due to missing her appointment due to being hospitalized.     Plan  1.Referral to Internal Medicine  2. CBC,CMP, TSH, Vitamin D, Vitamin B12, TSH   3. Increase Valium to 5mg; approved by Dr. Soliman.   4. Contacted GI to move patient apt up for further evaluation regarding Constipation and GI  symptoms.   5. Start Accrufer 30mg BID for decreased HGB.   6. Contacted Dr. Davis regarding decline in patients kidney function.

## 2022-10-31 NOTE — TELEPHONE ENCOUNTER
----- Message from FLOR Arellano sent at 10/31/2022  1:41 PM CDT -----  Please let patient know that labs show  CMP: worsening kidney function with a GFR down to 5.4. Please see when patient is following up with Dr. Davis. If this is not soon, we need to call and let them know and see if we can get her seen ASAP.     CBC: hgb remains low, we will trial accrufer to see if this will help with anemia and symptoms.

## 2022-10-31 NOTE — TELEPHONE ENCOUNTER
Racheal from [facility] returned my phone call and informed of patient's hospital follow up was today and GFR has dropped from 6 to 5.4. She states she will tell the provider and they will take care of it.

## 2022-11-01 ENCOUNTER — TELEPHONE (OUTPATIENT)
Dept: ENDOCRINOLOGY | Facility: CLINIC | Age: 62
End: 2022-11-01

## 2022-11-01 ENCOUNTER — DOCUMENTATION (OUTPATIENT)
Dept: ENDOCRINOLOGY | Facility: CLINIC | Age: 62
End: 2022-11-01

## 2022-11-01 NOTE — TELEPHONE ENCOUNTER
PA was submitted and Approved.    Once pharmacy receives the approval they will dispense the medication

## 2022-11-02 ENCOUNTER — TELEPHONE (OUTPATIENT)
Dept: FAMILY MEDICINE CLINIC | Facility: CLINIC | Age: 62
End: 2022-11-02

## 2022-11-02 ENCOUNTER — TELEPHONE (OUTPATIENT)
Dept: ENDOCRINOLOGY | Facility: CLINIC | Age: 62
End: 2022-11-02

## 2022-11-02 NOTE — TELEPHONE ENCOUNTER
Pt called and left a voicemail stating she had blood work completed for her PCP and her TSH came back at 4.7. She wanted to let Dr Pradhan know in case he wanted to adjust any of her medications.    Please advise.    Thanks

## 2022-11-02 NOTE — TELEPHONE ENCOUNTER
----- Message from FLOR Arellano sent at 11/2/2022  6:56 AM CDT -----  Please let patient know that TSH is very increased at 4.7. This is followed by Dr. Benitez. She will need to let him know. So this can be readdressed.

## 2022-11-02 NOTE — TELEPHONE ENCOUNTER
Patient called office and informed of TSH is very increased at 4.7. This is followed by Dr. Benitez. She will need to let him know. So this can be readdressed. She states she will call his office.

## 2022-11-02 NOTE — CARE COORDINATION
10/25/22 1300   IMM Letter   IMM Letter given to Patient/Family/Significant other/Guardian/POA/by: JENNIFER gave to Pt and explained; IVY Sullivan   IMM Letter date given: 10/25/22   IMM Letter time given: 1300   Electronically signed by IVY Roberts on 11/2/2022 at 5:33 PM

## 2022-11-04 ENCOUNTER — OFFICE VISIT (OUTPATIENT)
Dept: GASTROENTEROLOGY | Facility: CLINIC | Age: 62
End: 2022-11-04

## 2022-11-04 VITALS
WEIGHT: 293 LBS | SYSTOLIC BLOOD PRESSURE: 128 MMHG | HEART RATE: 82 BPM | BODY MASS INDEX: 48.82 KG/M2 | DIASTOLIC BLOOD PRESSURE: 60 MMHG | HEIGHT: 65 IN | OXYGEN SATURATION: 100 % | TEMPERATURE: 96.6 F

## 2022-11-04 DIAGNOSIS — D13.1 ADENOMATOUS POLYP OF STOMACH: Primary | ICD-10-CM

## 2022-11-04 DIAGNOSIS — K59.09 CHRONIC CONSTIPATION: ICD-10-CM

## 2022-11-04 PROCEDURE — 99214 OFFICE O/P EST MOD 30 MIN: CPT | Performed by: NURSE PRACTITIONER

## 2022-11-04 RX ORDER — FAMOTIDINE 20 MG/1
20 TABLET, FILM COATED ORAL DAILY
Status: ON HOLD | COMMUNITY
End: 2022-11-28 | Stop reason: SDUPTHER

## 2022-11-04 RX ORDER — MULTIPLE VITAMINS W/ MINERALS TAB 9MG-400MCG
1 TAB ORAL DAILY
COMMUNITY

## 2022-11-04 NOTE — PROGRESS NOTES
[facility] GASTROENTEROLOGY - OFFICE NOTE    11/4/2022    Maria C Shrestha   1960    Primary Physician: Ole Soliman MD    Chief Complaint   Patient presents with   • Endoscopy   history of gastric adenomatous polyp.      HISTORY OF PRESENT ILLNESS:     Maria C Shrestha is a 62 y.o. female presents with history of gastric adenomatous polyp.  She is due for repeat upper endoscopy.  She has no upper GI complaints at this time.  No unintentional weight loss.      Chronic constipation  She recently came to the hospital and was disimpacted.  She does not take a laxative.  Does not drink a lot of water but does have a fluid restriction.  No rectal bleeding.  No unintentional weight loss.      She does hemodialysis 5 days per week with heparin.       UPPER GI ENDOSCOPY (05/09/2022 11:15) recall 6 months.    - Normal esophagus.  - A few gastric polyps. Resected and retrieved. Clip (MR conditional) was placed.  - Normal examined duodenum.        COLONOSCOPY (05/09/2022 11:14)   - An Anal papilla was hypertrophied with mild erythema unchanged from prior two exams.  - The examination was otherwise normal on direct and retroflexion views.  - No specimens collected.  Tissue Pathology Exam (05/09/2022 11:22)  Final Diagnosis   Gastric polyps x3, polypectomy:  A.  Gastric adenomatous polyps (2), negative for high-grade dysplasia.  B.  Gastric hyperplastic polyp (1).  C.  Chronic inflammation, mild.   Electronically signed by Margarita Cullen MD on 5/11/2022          ======================================================================  Ov 4-20-22 HPI Maria C Shrestha is a 62 y.o. female who presents as a referral for preventative maintenance. She has no complaints of nausea or vomiting. No change in bowels. No wt loss. No melena. No abdominal pain.         Bright red blood per rectum   Intermittent for years. Small amount.         Black stool   Since 8/2022. Since starting auryxia. Stools formed.  Takes asa daily. No nsaids or  "arthritis meds.  No history of gastric ulcers. No pepto or iron pills. No abdominal pain.            COLONOSCOPY (03/22/2017 08:14) recall 5 years   Tissue Exam (03/22/2017 08:39)tubular adenomatous    No family history   Had egd several years ago. \" had gastric polyps\".         She has chronic kidney failure. Gets dialysis T, Th, and Saturday.      Past Medical History:   Diagnosis Date   • Acquired hypothyroidism 02/06/2017   • Allergic    • Anemia    • Anemia due to stage 4 chronic kidney disease ([facility]) 08/18/2020   • Anxiety    • Arthritis    • Cellulitis    • CHF (congestive heart failure) ([facility])    • Chronic kidney disease     3rd stage   • Depression    • Disease of thyroid gland    • Dyslipidemia    • Elevated cholesterol    • Essential hypertension    • Fatigue    • Gallbladder abscess    • Hearing loss    • History of transfusion    • Light headed    • Limited range of motion (ROM) of shoulder     right   • Obesity    • Palpitation    • Short of breath on exertion    • Sinusitis    • Sleep apnea    • Stage 4 chronic kidney disease ([facility]) 09/16/2020   • Type 2 diabetes mellitus ([facility])    • Type II diabetes mellitus, uncontrolled    • Wears glasses        Past Surgical History:   Procedure Laterality Date   • ADENOIDECTOMY     • ANAL FISTULA REPAIR      x 2    • ARTERIOVENOUS FISTULA Left 08/2021   • CHOLECYSTECTOMY     • COLONOSCOPY  01/02/2014   • COLONOSCOPY N/A 03/22/2017    Procedure: COLONOSCOPY WITH ANESTHESIA;  Surgeon: Mono Linder MD;  Location: [facility] ENDOSCOPY;  Service:    • COLONOSCOPY N/A 05/09/2022    Procedure: COLONOSCOPY WITH ANESTHESIA;  Surgeon: Mono Linder MD;  Location: [facility] ENDOSCOPY;  Service: Gastroenterology;  Laterality: N/A;  pre polyp;brbpr  post  Dr. Soliman   • D & C HYSTEROSCOPY N/A 07/25/2018    Procedure: DILATATION AND CURETTAGE HYSTEROSCOPY;  Surgeon: Michael Castaneda MD;  Location: [facility] OR;  Service: Obstetrics/Gynecology   • DILATATION AND CURETTAGE      X " "2   • ENDOSCOPY     • ENDOSCOPY N/A 05/09/2022    Procedure: ESOPHAGOGASTRODUODENOSCOPY WITH ANESTHESIA;  Surgeon: Mono Linder MD;  Location: [facility] ENDOSCOPY;  Service: Gastroenterology;  Laterality: N/A;  pre screen  post gastric polyps  Dr. Soilman   • HYSTERECTOMY     • TONSILLECTOMY         Outpatient Medications Marked as Taking for the 11/4/22 encounter (Office Visit) with Lisette Denton APRN   Medication Sig Dispense Refill   • allopurinol (ZYLOPRIM) 100 MG tablet Take 1 tablet by mouth.     • Ascorbic Acid (VITAMIN C PO) Take  by mouth Daily.     • aspirin 81 MG tablet Take 81 mg by mouth Daily. Stop 7/22/2018     • BD Insulin Syringe U/F 31G X 5/16\" 1 ML misc AS DIRECTED FOUR TIMES A  each 1   • buPROPion SR (Wellbutrin SR) 100 MG 12 hr tablet Take 1 tablet by mouth 2 (Two) Times a Day. 60 tablet 1   • busPIRone (BUSPAR) 10 MG tablet Take 2 tablets by mouth 2 (Two) Times a Day. 180 tablet 0   • calcitriol (ROCALTROL) 0.5 MCG capsule Take 0.5 mcg by mouth Daily. 3 x a week     • cetirizine (zyrTEC) 10 MG tablet Take 1 tablet by mouth.     • citalopram (CeleXA) 40 MG tablet Take 1 tablet by mouth Daily. 30 tablet 1   • diazePAM (Valium) 5 MG tablet Take 1 tablet by mouth As Needed for Anxiety. 30 tablet 0   • Docusate Calcium (STOOL SOFTENER PO) Take  by mouth 2 (Two) Times a Day.     • epoetin elsa (EPOGEN,PROCRIT) 14894 UNIT/ML injection Inject 1 mL under the skin into the appropriate area as directed 2 (Two) Times a Week.     • Evolocumab with Infusor (REPATHA) solution cartridge Inject 3.5 mL under the skin into the appropriate area as directed 2 (Two) Times a Week.     • famotidine (PEPCID) 20 MG tablet Take 1 tablet by mouth Daily.     • fluticasone (FLONASE) 50 MCG/ACT nasal spray 1 spray into the nostril(s) as directed by provider 2 (Two) Times a Day.     • Glucose Blood (BLOOD GLUCOSE TEST) strip Use 4 x daily, use any brand covered by insurance or same brand as before 360 each 3   • " Insulin Disposable Pump (Omnipod 5 G6 Pod, Gen 5,) misc 1 each Every Other Day. 15 each 6   • Insulin Pen Needle (B-D ULTRAFINE III SHORT PEN) 31G X 8 MM misc USE AS DIRECTED TO INJECT FOUR TIMES DAILY 200 each 2   • Insulin Regular Human, Conc, (HumuLIN R U-500 KwikPen) 500 UNIT/ML solution pen-injector CONCENTRATED injection 150 units twice daily 18 pen 3   • lamoTRIgine (LaMICtal) 100 MG tablet TAKE ONE TABLET BY MOUTH EVERY DAY 30 tablet 1   • Lancets (freestyle) lancets Use as instructed 4 x daily 150 each 11   • levothyroxine (SYNTHROID, LEVOTHROID) 88 MCG tablet Take 1 tablet by mouth Daily. 90 tablet 3   • Methoxy PEG-Epoetin Beta (MIRCERA IJ) 50 mcg Every 28 (Twenty-Eight) Days.     • multivitamin with minerals (PRORENAL + D PO) Take 1 tablet by mouth Daily.     • mupirocin (BACTROBAN) 2 % ointment Apply  topically to the appropriate area as directed 3 (Three) Times a Day. (Patient taking differently: Apply 11 application topically to the appropriate area as directed As Needed.) 22 g 0   • ONDANSETRON PO Take  by mouth As Needed.     • POTASSIUM PO Take  by mouth Daily.     • rosuvastatin (CRESTOR) 20 MG tablet Take 1 tablet by mouth Every Night. 90 tablet 1   • Semaglutide, 2 MG/DOSE, (Ozempic, 2 MG/DOSE,) 8 MG/3ML solution pen-injector Inject 2 mg under the skin into the appropriate area as directed 1 (One) Time Per Week. Please cancel Trulicity 3 pen 3   • sevelamer (RENVELA) 800 MG tablet TAKE 2 TABLETS BY MOUTH THREE TIMES DAILY WITH MEALS AND 1 TABLET WITH A SNACK         Allergies   Allergen Reactions   • Codeine Nausea Only and Nausea And Vomiting     N/v/felt hot       Social History     Socioeconomic History   • Marital status:    Tobacco Use   • Smoking status: Never   • Smokeless tobacco: Never   Vaping Use   • Vaping Use: Never used   Substance and Sexual Activity   • Alcohol use: No   • Drug use: No   • Sexual activity: Defer     Birth control/protection: Post-menopausal       Family  "History   Problem Relation Age of Onset   • Diabetes Other    • Heart failure Other    • Cancer Other    • Kidney disease Other    • Lung cancer Mother    • Heart disease Father    • Diabetes Father    • Obesity Father    • Stroke Father    • Prostate cancer Father    • Cancer Maternal Grandmother    • Uterine cancer Maternal Grandmother    • Diabetes Maternal Grandfather    • Cancer Paternal Grandmother    • Colon cancer Paternal Grandmother    • Diabetes Paternal Grandfather    • Heart disease Paternal Grandfather    • No Known Problems Sister    • No Known Problems Brother    • No Known Problems Daughter    • No Known Problems Maternal Aunt    • No Known Problems Paternal Aunt    • BRCA 1/2 Neg Hx    • Breast cancer Neg Hx    • Endometrial cancer Neg Hx    • Ovarian cancer Neg Hx        Review of Systems   Constitutional: Negative for chills, fever and unexpected weight change.   Respiratory: Negative for shortness of breath.    Cardiovascular: Negative for chest pain.   Gastrointestinal: Positive for constipation. Negative for abdominal distention, abdominal pain, anal bleeding, blood in stool, diarrhea, nausea and vomiting.        Vitals:    11/04/22 0851   BP: 128/60   Cuff Size: Adult   Pulse: 82   Temp: 96.6 °F (35.9 °C)   SpO2: 100%   Weight: (!) 143 kg (315 lb)   Height: 165.1 cm (65\")      Body mass index is 52.42 kg/m².    Physical Exam  Vitals reviewed.   Constitutional:       General: She is not in acute distress.  Cardiovascular:      Rate and Rhythm: Normal rate and regular rhythm.      Heart sounds: Normal heart sounds.   Pulmonary:      Effort: Pulmonary effort is normal.      Breath sounds: Normal breath sounds.   Abdominal:      General: Bowel sounds are normal. There is no distension.      Palpations: Abdomen is soft.      Tenderness: There is no abdominal tenderness.   Skin:     General: Skin is warm and dry.   Neurological:      Mental Status: She is alert.         Results for orders placed or " performed in visit on 10/31/22   CBC Auto Differential    Specimen: Blood   Result Value Ref Range    WBC 11.90 (H) 3.40 - 10.80 10*3/mm3    RBC 2.49 (L) 3.77 - 5.28 10*6/mm3    Hemoglobin 8.9 (L) 12.0 - 15.9 g/dL    Hematocrit 27.2 (L) 34.0 - 46.6 %    .2 (H) 79.0 - 97.0 fL    MCH 35.7 (H) 26.6 - 33.0 pg    MCHC 32.7 31.5 - 35.7 g/dL    RDW 16.1 (H) 12.3 - 15.4 %    MPV 7.9 6.0 - 12.0 fL    Platelets 304 140 - 450 10*3/mm3    Neutrophil % 77.6 (H) 42.7 - 76.0 %    Lymphocyte % 17.4 (L) 19.6 - 45.3 %    Auto Mixed Cells % 5.0 0.1 - 24.0 %    Neutrophils, Absolute 9.20 (H) 1.70 - 7.00 10*3/mm3    Lymphocytes, Absolute 2.10 0.70 - 3.10 10*3/mm3    Auto Mixed Cells # 0.60 0.10 - 2.60 10*3/mm3   Comprehensive Metabolic Panel    Specimen: Blood   Result Value Ref Range    Glucose 131 (H) 70 - 100 mg/dL    BUN 42 (H) 5 - 21 mg/dL    Creatinine 7.78 (H) 0.50 - 1.40 mg/dL    Sodium 138 135 - 145 mmol/L    Potassium 4.3 3.5 - 5.3 mmol/L    Chloride 90 (L) 98 - 110 mmol/L    CO2 30.0 24.0 - 31.0 mmol/L    Calcium 10.4 8.4 - 10.4 mg/dL    Total Protein 8.1 6.3 - 8.7 g/dL    Albumin 4.40 3.50 - 5.00 g/dL    ALT (SGPT) 18 0 - 35 U/L    AST (SGOT) 24 7 - 45 U/L    Alkaline Phosphatase 98 24 - 120 U/L    Total Bilirubin 0.5 0.1 - 1.0 mg/dL    Globulin 3.7 gm/dL    A/G Ratio 1.2 1.1 - 2.5 g/dL    BUN/Creatinine Ratio 5.4 (L)      Anion Gap 18.0 (H) 4.0 - 13.0 mmol/L    eGFR 5.4 (L) >60.0 mL/min/1.73   TSH    Specimen: Blood   Result Value Ref Range    TSH 4.700 (H) 0.270 - 4.200 uIU/mL           ASSESSMENT AND PLAN    Assessment & Plan     Diagnoses and all orders for this visit:    1. Adenomatous polyp of stomach (Primary)  -     Case Request; Standing  -     Case Request    2. Chronic constipation    Other orders  -     Implement Anesthesia Orders Day of Procedure; Standing  -     Obtain Informed Consent; Standing    In regards to history of adenomatous polyp in the stomach, repeat upper endoscopy recommended.  We will  schedule procedure.      In regards to chronic constipation, I do recommend taking a fiber supplement on a daily basis.  I do recommend increase water intake but I do understand she has a fluid restriction.  I do recommend MiraLAX daily as well and she will check with her nephrologist to make sure that he is okay with her taking MiraLAX.  Follow-up here as needed.      ESOPHAGOGASTRODUODENOSCOPY WITH ANESTHESIA (N/A)  Risk, benefits, and alternatives of endoscopy were explained in full.  They understand that there is a risk of bleeding, perforation, and infection.  The risk of perforation goes up with esophageal dilation.  Other options to evaluate UGI complaints could involve barium swallow or UGI series, but these would be diagnostic tests only.  Patient was given time to ask questions.  I answered them to their satisfaction and they are agreeable to proceeding         No follow-ups on file.          There are no Patient Instructions on file for this visit.      Lisette Denton, APRN

## 2022-11-05 RX ORDER — LEVOTHYROXINE SODIUM 0.1 MG/1
100 TABLET ORAL DAILY
Qty: 90 TABLET | Refills: 3 | Status: SHIPPED | OUTPATIENT
Start: 2022-11-05 | End: 2022-11-05 | Stop reason: SDUPTHER

## 2022-11-05 RX ORDER — LEVOTHYROXINE SODIUM 0.1 MG/1
100 TABLET ORAL DAILY
Qty: 90 TABLET | Refills: 3 | Status: SHIPPED | OUTPATIENT
Start: 2022-11-05 | End: 2022-11-07 | Stop reason: SDUPTHER

## 2022-11-07 RX ORDER — LEVOTHYROXINE SODIUM 0.1 MG/1
100 TABLET ORAL DAILY
Qty: 90 TABLET | Refills: 3 | Status: SHIPPED | OUTPATIENT
Start: 2022-11-07 | End: 2023-11-07

## 2022-11-07 NOTE — TELEPHONE ENCOUNTER
Patient is told to increase levothyroxine to 100mcg daily and a new script is sent to [facility] but patient no longer uses [facility] so I resent the Rx to [facility] in [place]

## 2022-11-10 ENCOUNTER — TELEPHONE (OUTPATIENT)
Dept: FAMILY MEDICINE CLINIC | Facility: CLINIC | Age: 62
End: 2022-11-10

## 2022-11-10 DIAGNOSIS — E66.01 CLASS 3 SEVERE OBESITY DUE TO EXCESS CALORIES WITHOUT SERIOUS COMORBIDITY WITH BODY MASS INDEX (BMI) OF 50.0 TO 59.9 IN ADULT: ICD-10-CM

## 2022-11-10 NOTE — TELEPHONE ENCOUNTER
Caller: LINUS    Relationship to patient: Home Health    Best call back number: [phone]    Patient is needing: PATIENT NEEDS BARIATRIC ROLLATOR        PRESCRIPTION TO GO TO: MED[facility]ARE

## 2022-11-11 ENCOUNTER — TELEPHONE (OUTPATIENT)
Dept: FAMILY MEDICINE CLINIC | Facility: CLINIC | Age: 62
End: 2022-11-11

## 2022-11-11 DIAGNOSIS — E66.01 CLASS 3 SEVERE OBESITY DUE TO EXCESS CALORIES WITHOUT SERIOUS COMORBIDITY WITH BODY MASS INDEX (BMI) OF 50.0 TO 59.9 IN ADULT: Primary | ICD-10-CM

## 2022-11-11 NOTE — TELEPHONE ENCOUNTER
Caller: KAREN    Relationship: Other    Best call back number: [phone]    What form or medical record are you requesting: DEMOGRAPHICS, HEIGHT AND WEIGHT, INSURANCE, AND NOTES ABOUT ORDER    Who is requesting this form or medical record from you: [facility]    How would you like to receive the form or medical records (pick-up, mail, fax): FAX  If fax, what is the fax number: [phone]    Timeframe paperwork needed: AS SOON AS POSSIBLE    Additional notes: ORDER FOR A BARIATRIC WALKER WITH SEAT

## 2022-11-28 ENCOUNTER — ANESTHESIA EVENT (OUTPATIENT)
Dept: GASTROENTEROLOGY | Facility: HOSPITAL | Age: 62
End: 2022-11-28

## 2022-11-28 ENCOUNTER — HOSPITAL ENCOUNTER (OUTPATIENT)
Facility: HOSPITAL | Age: 62
Setting detail: HOSPITAL OUTPATIENT SURGERY
Discharge: HOME | End: 2022-11-28
Attending: INTERNAL MEDICINE | Admitting: INTERNAL MEDICINE

## 2022-11-28 ENCOUNTER — ANESTHESIA (OUTPATIENT)
Dept: GASTROENTEROLOGY | Facility: HOSPITAL | Age: 62
End: 2022-11-28

## 2022-11-28 ENCOUNTER — TELEPHONE (OUTPATIENT)
Dept: GASTROENTEROLOGY | Facility: CLINIC | Age: 62
End: 2022-11-28

## 2022-11-28 VITALS
DIASTOLIC BLOOD PRESSURE: 55 MMHG | HEIGHT: 65 IN | OXYGEN SATURATION: 98 % | WEIGHT: 293 LBS | HEART RATE: 73 BPM | TEMPERATURE: 96.2 F | BODY MASS INDEX: 48.82 KG/M2 | SYSTOLIC BLOOD PRESSURE: 112 MMHG | RESPIRATION RATE: 18 BRPM

## 2022-11-28 LAB — GLUCOSE BLDC GLUCOMTR-MCNC: 121 MG/DL (ref 70–130)

## 2022-11-28 PROCEDURE — 43235 EGD DIAGNOSTIC BRUSH WASH: CPT | Performed by: INTERNAL MEDICINE

## 2022-11-28 PROCEDURE — 82962 GLUCOSE BLOOD TEST: CPT

## 2022-11-28 PROCEDURE — 25010000002 PROPOFOL 10 MG/ML EMULSION: Performed by: NURSE ANESTHETIST, CERTIFIED REGISTERED

## 2022-11-28 RX ORDER — SODIUM CHLORIDE 9 MG/ML
40 INJECTION, SOLUTION INTRAVENOUS AS NEEDED
Status: CANCELLED | OUTPATIENT
Start: 2022-11-28

## 2022-11-28 RX ORDER — FAMOTIDINE 20 MG/1
40 TABLET, FILM COATED ORAL 2 TIMES DAILY
Qty: 60 TABLET | Refills: 11 | Status: SHIPPED | OUTPATIENT
Start: 2022-11-28 | End: 2023-03-07 | Stop reason: SDUPTHER

## 2022-11-28 RX ORDER — LIDOCAINE HYDROCHLORIDE 20 MG/ML
INJECTION, SOLUTION EPIDURAL; INFILTRATION; INTRACAUDAL; PERINEURAL AS NEEDED
Status: DISCONTINUED | OUTPATIENT
Start: 2022-11-28 | End: 2022-11-28 | Stop reason: SURG

## 2022-11-28 RX ORDER — PROPOFOL 10 MG/ML
VIAL (ML) INTRAVENOUS AS NEEDED
Status: DISCONTINUED | OUTPATIENT
Start: 2022-11-28 | End: 2022-11-28 | Stop reason: SURG

## 2022-11-28 RX ORDER — LIDOCAINE HYDROCHLORIDE 10 MG/ML
0.5 INJECTION, SOLUTION EPIDURAL; INFILTRATION; INTRACAUDAL; PERINEURAL ONCE AS NEEDED
Status: DISCONTINUED | OUTPATIENT
Start: 2022-11-28 | End: 2022-11-28 | Stop reason: HOSPADM

## 2022-11-28 RX ORDER — ONDANSETRON 2 MG/ML
4 INJECTION INTRAMUSCULAR; INTRAVENOUS ONCE AS NEEDED
Status: DISCONTINUED | OUTPATIENT
Start: 2022-11-28 | End: 2022-11-28 | Stop reason: HOSPADM

## 2022-11-28 RX ORDER — SODIUM CHLORIDE 9 MG/ML
100 INJECTION, SOLUTION INTRAVENOUS CONTINUOUS
Status: CANCELLED | OUTPATIENT
Start: 2022-11-28

## 2022-11-28 RX ORDER — SODIUM CHLORIDE 0.9 % (FLUSH) 0.9 %
10 SYRINGE (ML) INJECTION AS NEEDED
Status: DISCONTINUED | OUTPATIENT
Start: 2022-11-28 | End: 2022-11-28 | Stop reason: HOSPADM

## 2022-11-28 RX ORDER — SODIUM CHLORIDE 9 MG/ML
500 INJECTION, SOLUTION INTRAVENOUS CONTINUOUS PRN
Status: DISCONTINUED | OUTPATIENT
Start: 2022-11-28 | End: 2022-11-28 | Stop reason: HOSPADM

## 2022-11-28 RX ORDER — SODIUM CHLORIDE 0.9 % (FLUSH) 0.9 %
10 SYRINGE (ML) INJECTION AS NEEDED
Status: CANCELLED | OUTPATIENT
Start: 2022-11-28

## 2022-11-28 RX ORDER — SODIUM CHLORIDE 0.9 % (FLUSH) 0.9 %
10 SYRINGE (ML) INJECTION EVERY 12 HOURS SCHEDULED
Status: CANCELLED | OUTPATIENT
Start: 2022-11-28

## 2022-11-28 RX ADMIN — PROPOFOL 150 MG: 10 INJECTION, EMULSION INTRAVENOUS at 12:21

## 2022-11-28 RX ADMIN — LIDOCAINE HYDROCHLORIDE 100 MG: 20 INJECTION, SOLUTION EPIDURAL; INFILTRATION; INTRACAUDAL; PERINEURAL at 12:21

## 2022-11-28 RX ADMIN — SODIUM CHLORIDE 500 ML: 9 INJECTION, SOLUTION INTRAVENOUS at 11:52

## 2022-11-28 NOTE — ANESTHESIA POSTPROCEDURE EVALUATION
Patient: Maria C Shrestha    Procedure Summary     Date: 11/28/22 Room / Location: [facility] ENDOSCOPY 5 / [facility]    Anesthesia Start: 1206 Anesthesia Stop: 1229    Procedure: ESOPHAGOGASTRODUODENOSCOPY WITH ANESTHESIA Diagnosis:       Adenomatous polyp of stomach      (Adenomatous polyp of stomach [D13.1])    Surgeons: Mono Linder MD Provider: Michael Burks CRNA    Anesthesia Type: MAC ASA Status: 4          Anesthesia Type: MAC    Vitals  No vitals data found for the desired time range.          Post Anesthesia Care and Evaluation    Patient location during evaluation: PHASE II  Patient participation: complete - patient participated  Level of consciousness: awake  Pain management: adequate    Airway patency: patent  Anesthetic complications: No anesthetic complications  Respiratory status: acceptable  Hydration status: acceptable

## 2022-11-28 NOTE — ANESTHESIA PREPROCEDURE EVALUATION
Anesthesia Evaluation     Patient summary reviewed   no history of anesthetic complications:  NPO Solid Status: > 8 hours             Airway   Mallampati: II  Dental      Pulmonary    (+) sleep apnea on CPAP,   Cardiovascular   Exercise tolerance: poor (<4 METS)    (+) hypertension, CHF ,       Neuro/Psych- negative ROS  GI/Hepatic/Renal/Endo    (+) morbid obesity,  renal disease (last HD Saturday--no issues ) CRI and dialysis, diabetes mellitus using insulin, thyroid problem hypothyroidism    Musculoskeletal     Abdominal   (+) obese,    Substance History      OB/GYN          Other                          Anesthesia Plan    ASA 4     MAC     intravenous induction     Anesthetic plan, risks, benefits, and alternatives have been provided, discussed and informed consent has been obtained with: patient and spouse/significant other.        CODE STATUS:

## 2022-11-28 NOTE — TELEPHONE ENCOUNTER
She presented today for follow-up endoscopy.  The endoscopy revealed a fair amount of fluid and solid food particles present.  This obscured the view as well as put her at increased risk for aspiration.  I did see a small polyp I elected not to remove it secondary to the presence of the food which would increase the risk.    Therefore, recommend repeat endoscopy in the near future.  Suggest a clear liquid diet the day before with n.p.o. after midnight.  Send a copy of this note at the time.

## 2022-11-30 ENCOUNTER — PREP FOR SURGERY (OUTPATIENT)
Dept: OTHER | Facility: HOSPITAL | Age: 62
End: 2022-11-30

## 2022-11-30 DIAGNOSIS — K31.7 GASTRIC POLYPS: Primary | ICD-10-CM

## 2022-12-01 PROBLEM — G47.33 OSA ON CPAP: Status: ACTIVE | Noted: 2017-02-26

## 2022-12-01 PROBLEM — N18.6 TYPE 2 DIABETES MELLITUS WITH ESRD (END-STAGE RENAL DISEASE) (HCC): Status: ACTIVE | Noted: 2017-02-26

## 2022-12-01 PROBLEM — Z99.89 OSA ON CPAP: Status: ACTIVE | Noted: 2017-02-26

## 2022-12-01 PROBLEM — N18.6 TYPE 2 DIABETES MELLITUS WITH ESRD (END-STAGE RENAL DISEASE) (HCC): Status: ACTIVE | Noted: 2021-08-10

## 2022-12-01 PROBLEM — E11.22 TYPE 2 DIABETES MELLITUS WITH ESRD (END-STAGE RENAL DISEASE) (HCC): Status: ACTIVE | Noted: 2021-08-10

## 2022-12-02 ENCOUNTER — OFFICE VISIT (OUTPATIENT)
Dept: PRIMARY CARE CLINIC | Age: 62
End: 2022-12-02

## 2022-12-02 VITALS
TEMPERATURE: 97.1 F | OXYGEN SATURATION: 98 % | DIASTOLIC BLOOD PRESSURE: 80 MMHG | WEIGHT: 293 LBS | BODY MASS INDEX: 48.82 KG/M2 | HEIGHT: 65 IN | SYSTOLIC BLOOD PRESSURE: 122 MMHG | HEART RATE: 76 BPM | RESPIRATION RATE: 20 BRPM

## 2022-12-02 DIAGNOSIS — Z11.4 SCREENING FOR HIV WITHOUT PRESENCE OF RISK FACTORS: ICD-10-CM

## 2022-12-02 DIAGNOSIS — I10 PRIMARY HYPERTENSION: ICD-10-CM

## 2022-12-02 DIAGNOSIS — G47.33 OSA ON CPAP: ICD-10-CM

## 2022-12-02 DIAGNOSIS — E07.9 THYROID DISEASE: ICD-10-CM

## 2022-12-02 DIAGNOSIS — Z86.718 HISTORY OF DVT (DEEP VEIN THROMBOSIS): ICD-10-CM

## 2022-12-02 DIAGNOSIS — F32.1 MODERATE MAJOR DEPRESSION (HCC): ICD-10-CM

## 2022-12-02 DIAGNOSIS — E78.1 HYPERTRIGLYCERIDEMIA: ICD-10-CM

## 2022-12-02 DIAGNOSIS — N18.6 ESRD ON DIALYSIS (HCC): ICD-10-CM

## 2022-12-02 DIAGNOSIS — E11.69 MIXED DIABETIC HYPERLIPIDEMIA ASSOCIATED WITH TYPE 2 DIABETES MELLITUS (HCC): ICD-10-CM

## 2022-12-02 DIAGNOSIS — N18.6 TYPE 2 DIABETES MELLITUS WITH ESRD (END-STAGE RENAL DISEASE) (HCC): ICD-10-CM

## 2022-12-02 DIAGNOSIS — R41.3 MEMORY DEFICIT: ICD-10-CM

## 2022-12-02 DIAGNOSIS — E78.2 MIXED DIABETIC HYPERLIPIDEMIA ASSOCIATED WITH TYPE 2 DIABETES MELLITUS (HCC): ICD-10-CM

## 2022-12-02 DIAGNOSIS — Z12.31 BREAST CANCER SCREENING BY MAMMOGRAM: Primary | ICD-10-CM

## 2022-12-02 DIAGNOSIS — E11.22 TYPE 2 DIABETES MELLITUS WITH ESRD (END-STAGE RENAL DISEASE) (HCC): ICD-10-CM

## 2022-12-02 DIAGNOSIS — Z99.89 OSA ON CPAP: ICD-10-CM

## 2022-12-02 DIAGNOSIS — Z99.2 ESRD ON DIALYSIS (HCC): ICD-10-CM

## 2022-12-02 DIAGNOSIS — Z13.31 POSITIVE DEPRESSION SCREENING: ICD-10-CM

## 2022-12-02 DIAGNOSIS — Z13.0 SCREENING FOR DEFICIENCY ANEMIA: ICD-10-CM

## 2022-12-02 PROBLEM — J96.01 ACUTE HYPOXEMIC RESPIRATORY FAILURE (HCC): Status: RESOLVED | Noted: 2021-08-10 | Resolved: 2022-12-02

## 2022-12-02 SDOH — ECONOMIC STABILITY: FOOD INSECURITY: WITHIN THE PAST 12 MONTHS, THE FOOD YOU BOUGHT JUST DIDN'T LAST AND YOU DIDN'T HAVE MONEY TO GET MORE.: NEVER TRUE

## 2022-12-02 SDOH — ECONOMIC STABILITY: FOOD INSECURITY: WITHIN THE PAST 12 MONTHS, YOU WORRIED THAT YOUR FOOD WOULD RUN OUT BEFORE YOU GOT MONEY TO BUY MORE.: NEVER TRUE

## 2022-12-02 ASSESSMENT — ANXIETY QUESTIONNAIRES
6. BECOMING EASILY ANNOYED OR IRRITABLE: 1
5. BEING SO RESTLESS THAT IT IS HARD TO SIT STILL: 1
IF YOU CHECKED OFF ANY PROBLEMS ON THIS QUESTIONNAIRE, HOW DIFFICULT HAVE THESE PROBLEMS MADE IT FOR YOU TO DO YOUR WORK, TAKE CARE OF THINGS AT HOME, OR GET ALONG WITH OTHER PEOPLE: SOMEWHAT DIFFICULT
3. WORRYING TOO MUCH ABOUT DIFFERENT THINGS: 2
7. FEELING AFRAID AS IF SOMETHING AWFUL MIGHT HAPPEN: 0
4. TROUBLE RELAXING: 1
2. NOT BEING ABLE TO STOP OR CONTROL WORRYING: 2
GAD7 TOTAL SCORE: 9
1. FEELING NERVOUS, ANXIOUS, OR ON EDGE: 2

## 2022-12-02 ASSESSMENT — PATIENT HEALTH QUESTIONNAIRE - PHQ9
7. TROUBLE CONCENTRATING ON THINGS, SUCH AS READING THE NEWSPAPER OR WATCHING TELEVISION: 1
2. FEELING DOWN, DEPRESSED OR HOPELESS: 2
1. LITTLE INTEREST OR PLEASURE IN DOING THINGS: 2
SUM OF ALL RESPONSES TO PHQ QUESTIONS 1-9: 13
3. TROUBLE FALLING OR STAYING ASLEEP: 2
8. MOVING OR SPEAKING SO SLOWLY THAT OTHER PEOPLE COULD HAVE NOTICED. OR THE OPPOSITE, BEING SO FIGETY OR RESTLESS THAT YOU HAVE BEEN MOVING AROUND A LOT MORE THAN USUAL: 1
4. FEELING TIRED OR HAVING LITTLE ENERGY: 2
10. IF YOU CHECKED OFF ANY PROBLEMS, HOW DIFFICULT HAVE THESE PROBLEMS MADE IT FOR YOU TO DO YOUR WORK, TAKE CARE OF THINGS AT HOME, OR GET ALONG WITH OTHER PEOPLE: 1
5. POOR APPETITE OR OVEREATING: 1
SUM OF ALL RESPONSES TO PHQ QUESTIONS 1-9: 13
SUM OF ALL RESPONSES TO PHQ QUESTIONS 1-9: 13
6. FEELING BAD ABOUT YOURSELF - OR THAT YOU ARE A FAILURE OR HAVE LET YOURSELF OR YOUR FAMILY DOWN: 1
SUM OF ALL RESPONSES TO PHQ9 QUESTIONS 1 & 2: 4
SUM OF ALL RESPONSES TO PHQ QUESTIONS 1-9: 12
9. THOUGHTS THAT YOU WOULD BE BETTER OFF DEAD, OR OF HURTING YOURSELF: 1

## 2022-12-02 ASSESSMENT — ENCOUNTER SYMPTOMS
WHEEZING: 0
SORE THROAT: 0
TROUBLE SWALLOWING: 0
BACK PAIN: 0
CONSTIPATION: 0
APNEA: 0
CHEST TIGHTNESS: 0
RHINORRHEA: 0
DIARRHEA: 0
BLOOD IN STOOL: 0
COUGH: 0
NAUSEA: 0
ABDOMINAL PAIN: 0
VOMITING: 0
ABDOMINAL DISTENTION: 0
SINUS PAIN: 0
SHORTNESS OF BREATH: 1

## 2022-12-02 ASSESSMENT — SOCIAL DETERMINANTS OF HEALTH (SDOH): HOW HARD IS IT FOR YOU TO PAY FOR THE VERY BASICS LIKE FOOD, HOUSING, MEDICAL CARE, AND HEATING?: NOT HARD AT ALL

## 2022-12-02 NOTE — ASSESSMENT & PLAN NOTE
Borderline controlled, continue current medications   PHQ Scores 12/2/2022   PHQ2 Score 4   PHQ9 Score 13      Patient is ON wellbutrrin 150 BID, Maximum Citalopram, Buspar and Lamictal, at this time she did not want to add any more medications

## 2022-12-07 DIAGNOSIS — N18.6 TYPE 2 DIABETES MELLITUS WITH ESRD (END-STAGE RENAL DISEASE) (HCC): ICD-10-CM

## 2022-12-07 DIAGNOSIS — E78.2 MIXED DIABETIC HYPERLIPIDEMIA ASSOCIATED WITH TYPE 2 DIABETES MELLITUS (HCC): ICD-10-CM

## 2022-12-07 DIAGNOSIS — E11.69 MIXED DIABETIC HYPERLIPIDEMIA ASSOCIATED WITH TYPE 2 DIABETES MELLITUS (HCC): ICD-10-CM

## 2022-12-07 DIAGNOSIS — E11.22 TYPE 2 DIABETES MELLITUS WITH ESRD (END-STAGE RENAL DISEASE) (HCC): ICD-10-CM

## 2022-12-07 DIAGNOSIS — I10 PRIMARY HYPERTENSION: ICD-10-CM

## 2022-12-07 DIAGNOSIS — Z99.2 ESRD ON DIALYSIS (HCC): Primary | ICD-10-CM

## 2022-12-07 DIAGNOSIS — R41.3 MEMORY DEFICIT: ICD-10-CM

## 2022-12-07 DIAGNOSIS — E07.9 THYROID DISEASE: ICD-10-CM

## 2022-12-07 DIAGNOSIS — Z99.2 ANEMIA DUE TO CHRONIC KIDNEY DISEASE, ON CHRONIC DIALYSIS (HCC): ICD-10-CM

## 2022-12-07 DIAGNOSIS — N18.6 ANEMIA DUE TO CHRONIC KIDNEY DISEASE, ON CHRONIC DIALYSIS (HCC): ICD-10-CM

## 2022-12-07 DIAGNOSIS — N18.6 ESRD ON DIALYSIS (HCC): Primary | ICD-10-CM

## 2022-12-07 DIAGNOSIS — D63.1 ANEMIA DUE TO CHRONIC KIDNEY DISEASE, ON CHRONIC DIALYSIS (HCC): ICD-10-CM

## 2022-12-07 DIAGNOSIS — Z11.4 SCREENING FOR HIV WITHOUT PRESENCE OF RISK FACTORS: ICD-10-CM

## 2022-12-07 DIAGNOSIS — Z13.0 SCREENING FOR DEFICIENCY ANEMIA: ICD-10-CM

## 2022-12-07 LAB
ALBUMIN SERPL-MCNC: 3.8 G/DL (ref 3.5–5.2)
ALP BLD-CCNC: 75 U/L (ref 35–104)
ALT SERPL-CCNC: 9 U/L (ref 5–33)
ANION GAP SERPL CALCULATED.3IONS-SCNC: 21 MMOL/L (ref 7–19)
AST SERPL-CCNC: 8 U/L (ref 5–32)
BASOPHILS ABSOLUTE: 0.1 K/UL (ref 0–0.2)
BASOPHILS RELATIVE PERCENT: 0.5 % (ref 0–1)
BILIRUB SERPL-MCNC: <0.2 MG/DL (ref 0.2–1.2)
BUN BLDV-MCNC: 51 MG/DL (ref 8–23)
CALCIUM SERPL-MCNC: 9.3 MG/DL (ref 8.8–10.2)
CHLORIDE BLD-SCNC: 94 MMOL/L (ref 98–111)
CHOLESTEROL, TOTAL: 140 MG/DL (ref 160–199)
CO2: 24 MMOL/L (ref 22–29)
CREAT SERPL-MCNC: 8.2 MG/DL (ref 0.5–0.9)
EOSINOPHILS ABSOLUTE: 0.2 K/UL (ref 0–0.6)
EOSINOPHILS RELATIVE PERCENT: 1.9 % (ref 0–5)
GFR SERPL CREATININE-BSD FRML MDRD: 5 ML/MIN/{1.73_M2}
GLUCOSE BLD-MCNC: 198 MG/DL (ref 74–109)
HBA1C MFR BLD: 5.5 % (ref 4–6)
HCT VFR BLD CALC: 22 % (ref 37–47)
HDLC SERPL-MCNC: 33 MG/DL (ref 65–121)
HEMOGLOBIN: 7.2 G/DL (ref 12–16)
IMMATURE GRANULOCYTES #: 0.2 K/UL
LDL CHOLESTEROL CALCULATED: ABNORMAL MG/DL
LDL CHOLESTEROL DIRECT: 48 MG/DL
LYMPHOCYTES ABSOLUTE: 1.4 K/UL (ref 1.1–4.5)
LYMPHOCYTES RELATIVE PERCENT: 12.4 % (ref 20–40)
MCH RBC QN AUTO: 37.3 PG (ref 27–31)
MCHC RBC AUTO-ENTMCNC: 32.7 G/DL (ref 33–37)
MCV RBC AUTO: 114 FL (ref 81–99)
MONOCYTES ABSOLUTE: 0.5 K/UL (ref 0–0.9)
MONOCYTES RELATIVE PERCENT: 4.7 % (ref 0–10)
NEUTROPHILS ABSOLUTE: 8.5 K/UL (ref 1.5–7.5)
NEUTROPHILS RELATIVE PERCENT: 78.6 % (ref 50–65)
PDW BLD-RTO: 16.1 % (ref 11.5–14.5)
PLATELET # BLD: 224 K/UL (ref 130–400)
PMV BLD AUTO: 9.8 FL (ref 9.4–12.3)
POTASSIUM SERPL-SCNC: 3.3 MMOL/L (ref 3.5–5)
RBC # BLD: 1.93 M/UL (ref 4.2–5.4)
SODIUM BLD-SCNC: 139 MMOL/L (ref 136–145)
TOTAL PROTEIN: 6.8 G/DL (ref 6.6–8.7)
TRIGL SERPL-MCNC: 430 MG/DL (ref 0–149)
TSH SERPL DL<=0.05 MIU/L-ACNC: 2.97 UIU/ML (ref 0.27–4.2)
VITAMIN B-12: 561 PG/ML (ref 211–946)
WBC # BLD: 10.9 K/UL (ref 4.8–10.8)

## 2022-12-08 LAB
HIV 1+2 AB/AGN: NEGATIVE
RPR: NORMAL

## 2022-12-09 LAB — LYME, EIA: 0.07 LIV (ref 0–1.2)

## 2022-12-13 ENCOUNTER — TELEPHONE (OUTPATIENT)
Dept: PRIMARY CARE CLINIC | Age: 62
End: 2022-12-13

## 2022-12-13 NOTE — TELEPHONE ENCOUNTER
Pt notified of lab findings and patient is concerned with her triglyceride level. Thank you Dr. yKleigh Carlisle.

## 2022-12-14 ENCOUNTER — HOSPITAL ENCOUNTER (OUTPATIENT)
Dept: CT IMAGING | Age: 62
Discharge: HOME | End: 2022-12-14
Payer: MEDICARE

## 2022-12-14 ENCOUNTER — TELEPHONE (OUTPATIENT)
Dept: ENDOCRINOLOGY | Facility: CLINIC | Age: 62
End: 2022-12-14

## 2022-12-14 DIAGNOSIS — D63.1 ANEMIA DUE TO CHRONIC KIDNEY DISEASE, ON CHRONIC DIALYSIS (HCC): ICD-10-CM

## 2022-12-14 DIAGNOSIS — R41.3 MEMORY DEFICIT: ICD-10-CM

## 2022-12-14 DIAGNOSIS — Z99.2 ANEMIA DUE TO CHRONIC KIDNEY DISEASE, ON CHRONIC DIALYSIS (HCC): ICD-10-CM

## 2022-12-14 DIAGNOSIS — N18.6 ANEMIA DUE TO CHRONIC KIDNEY DISEASE, ON CHRONIC DIALYSIS (HCC): ICD-10-CM

## 2022-12-14 LAB
FERRITIN: >2000 NG/ML (ref 13–150)
IRON SATURATION: 28 % (ref 14–50)
IRON: 63 UG/DL (ref 37–145)
TOTAL IRON BINDING CAPACITY: 222 UG/DL (ref 250–400)

## 2022-12-14 PROCEDURE — 70450 CT HEAD/BRAIN W/O DYE: CPT

## 2022-12-14 RX ORDER — INSULIN HUMAN 500 [IU]/ML
INJECTION, SOLUTION SUBCUTANEOUS
Qty: 45 ML | Refills: 11 | Status: SHIPPED | OUTPATIENT
Start: 2022-12-14

## 2022-12-14 NOTE — TELEPHONE ENCOUNTER
Patient called and stated al of her local pharmacies are out of Humalin vials. She is asking what to do. Requesting call back.     Phone [phone]    Thank you

## 2022-12-14 NOTE — TELEPHONE ENCOUNTER
Called and informed Maria C that we have called her in U-500 pens and she will get 8 pens and she will have to draw the insulin out of the pens to put it in her pump

## 2022-12-15 ENCOUNTER — TELEPHONE (OUTPATIENT)
Dept: PRIMARY CARE CLINIC | Age: 62
End: 2022-12-15

## 2022-12-16 ENCOUNTER — TELEPHONE (OUTPATIENT)
Dept: PRIMARY CARE CLINIC | Age: 62
End: 2022-12-16

## 2022-12-16 NOTE — TELEPHONE ENCOUNTER
----- Message from Nan Carey MD sent at 12/16/2022  1:04 PM CST -----  CT of the head shows some decrease in size of the brain which can be age-related however she has chronic microvascular ischemic changes these are small micro strokes that are occurring in her head usually because they are so small they will not cause symptoms of weakness however if there is a lot of micro strokes it can cause memory impairment eventually

## 2022-12-17 LAB
ALBUMIN SERPL-MCNC: 3.48 G/DL (ref 3.75–5.01)
ALPHA-1-GLOBULIN: 0.43 G/DL (ref 0.19–0.46)
ALPHA-2-GLOBULIN: 1.15 G/DL (ref 0.48–1.05)
BETA GLOBULIN: 0.72 G/DL (ref 0.48–1.1)
GAMMA GLOBULIN: 0.83 G/DL (ref 0.62–1.51)
PROTEIN ELECTROPHORESIS, SERUM: ABNORMAL
SPE/IFE INTERPRETATION: ABNORMAL
TOTAL PROTEIN: 6.6 G/DL (ref 6.3–8.2)

## 2022-12-19 ENCOUNTER — TELEPHONE (OUTPATIENT)
Dept: ENDOCRINOLOGY | Facility: CLINIC | Age: 62
End: 2022-12-19

## 2022-12-22 ENCOUNTER — DOCUMENTATION (OUTPATIENT)
Dept: ENDOCRINOLOGY | Facility: CLINIC | Age: 62
End: 2022-12-22

## 2022-12-22 RX ORDER — EVOLOCUMAB 140 MG/ML
INJECTION, SOLUTION SUBCUTANEOUS
Qty: 2 ML | Refills: 4 | Status: SHIPPED | OUTPATIENT
Start: 2022-12-22

## 2022-12-22 NOTE — TELEPHONE ENCOUNTER
Incoming Refill Request      Medication requested (name and dose):   Semaglutide, 2 MG/DOSE, (Ozempic, 2 MG/DOSE,) 8 MG/3ML solution pen-injector        Pharmacy where request should be sent:   [facility] - [place], [place] -    Additional details provided by patient:   patient does not use [facility] anymore    Best call back number: [phone]    Does the patient have less than a 3 day supply:  [x] Yes  [] No    Osvaldo Garcia Rep  12/22/22, 11:20 CST

## 2022-12-29 ENCOUNTER — ANESTHESIA (OUTPATIENT)
Dept: GASTROENTEROLOGY | Facility: HOSPITAL | Age: 62
End: 2022-12-29
Payer: MEDICARE

## 2022-12-29 ENCOUNTER — HOSPITAL ENCOUNTER (OUTPATIENT)
Facility: HOSPITAL | Age: 62
Setting detail: HOSPITAL OUTPATIENT SURGERY
Discharge: HOME | End: 2022-12-29
Attending: INTERNAL MEDICINE | Admitting: INTERNAL MEDICINE
Payer: MEDICARE

## 2022-12-29 ENCOUNTER — ANESTHESIA EVENT (OUTPATIENT)
Dept: GASTROENTEROLOGY | Facility: HOSPITAL | Age: 62
End: 2022-12-29
Payer: MEDICARE

## 2022-12-29 VITALS
TEMPERATURE: 97.1 F | BODY MASS INDEX: 48.82 KG/M2 | WEIGHT: 293 LBS | RESPIRATION RATE: 18 BRPM | OXYGEN SATURATION: 96 % | SYSTOLIC BLOOD PRESSURE: 98 MMHG | HEIGHT: 65 IN | HEART RATE: 79 BPM | DIASTOLIC BLOOD PRESSURE: 47 MMHG

## 2022-12-29 DIAGNOSIS — K31.7 GASTRIC POLYPS: ICD-10-CM

## 2022-12-29 LAB — GLUCOSE BLDC GLUCOMTR-MCNC: 224 MG/DL (ref 70–130)

## 2022-12-29 PROCEDURE — 43239 EGD BIOPSY SINGLE/MULTIPLE: CPT | Performed by: INTERNAL MEDICINE

## 2022-12-29 PROCEDURE — 87081 CULTURE SCREEN ONLY: CPT | Performed by: INTERNAL MEDICINE

## 2022-12-29 PROCEDURE — 43251 EGD REMOVE LESION SNARE: CPT | Performed by: INTERNAL MEDICINE

## 2022-12-29 PROCEDURE — 82962 GLUCOSE BLOOD TEST: CPT

## 2022-12-29 PROCEDURE — 88305 TISSUE EXAM BY PATHOLOGIST: CPT | Performed by: INTERNAL MEDICINE

## 2022-12-29 PROCEDURE — 25010000002 PROPOFOL 10 MG/ML EMULSION

## 2022-12-29 RX ORDER — PROPOFOL 10 MG/ML
VIAL (ML) INTRAVENOUS AS NEEDED
Status: DISCONTINUED | OUTPATIENT
Start: 2022-12-29 | End: 2022-12-29 | Stop reason: SURG

## 2022-12-29 RX ORDER — SODIUM CHLORIDE 0.9 % (FLUSH) 0.9 %
10 SYRINGE (ML) INJECTION AS NEEDED
Status: DISCONTINUED | OUTPATIENT
Start: 2022-12-29 | End: 2022-12-29 | Stop reason: HOSPADM

## 2022-12-29 RX ORDER — LIDOCAINE HYDROCHLORIDE 20 MG/ML
INJECTION, SOLUTION EPIDURAL; INFILTRATION; INTRACAUDAL; PERINEURAL AS NEEDED
Status: DISCONTINUED | OUTPATIENT
Start: 2022-12-29 | End: 2022-12-29 | Stop reason: SURG

## 2022-12-29 RX ORDER — SODIUM CHLORIDE 9 MG/ML
500 INJECTION, SOLUTION INTRAVENOUS CONTINUOUS PRN
Status: DISCONTINUED | OUTPATIENT
Start: 2022-12-29 | End: 2022-12-29 | Stop reason: HOSPADM

## 2022-12-29 RX ORDER — ONDANSETRON 2 MG/ML
4 INJECTION INTRAMUSCULAR; INTRAVENOUS ONCE AS NEEDED
Status: DISCONTINUED | OUTPATIENT
Start: 2022-12-29 | End: 2022-12-29 | Stop reason: HOSPADM

## 2022-12-29 RX ADMIN — PROPOFOL 380 MG: 10 INJECTION, EMULSION INTRAVENOUS at 12:14

## 2022-12-29 RX ADMIN — LIDOCAINE HYDROCHLORIDE 100 MG: 20 INJECTION, SOLUTION EPIDURAL; INFILTRATION; INTRACAUDAL; PERINEURAL at 12:14

## 2022-12-29 RX ADMIN — SODIUM CHLORIDE 500 ML: 9 INJECTION, SOLUTION INTRAVENOUS at 11:52

## 2022-12-29 NOTE — ANESTHESIA POSTPROCEDURE EVALUATION
"Patient: Maria C Shrestha    Procedure Summary     Date: 12/29/22 Room / Location: [facility] ENDOSCOPY 4 / [facility]    Anesthesia Start: 1208 Anesthesia Stop: 1235    Procedure: ESOPHAGOGASTRODUODENOSCOPY Diagnosis:       Gastric polyps      (Gastric polyps [K31.7])    Surgeons: Mono Linder MD Provider: Ariel Marie CRNA    Anesthesia Type: MAC ASA Status: 4          Anesthesia Type: MAC    Vitals  Vitals Value Taken Time   BP 98/47 12/29/22 1256   Temp     Pulse 78 12/29/22 1258   Resp 18 12/29/22 1255   SpO2 98 % 12/29/22 1257   Vitals shown include unvalidated device data.        Post Anesthesia Care and Evaluation    Patient location during evaluation: PHASE II  Patient participation: complete - patient participated  Level of consciousness: awake  Pain management: adequate    Airway patency: patent  Anesthetic complications: No anesthetic complications    Cardiovascular status: acceptable  Respiratory status: acceptable  Hydration status: acceptable    Comments: BP 98/47   Pulse 79   Temp 97.1 °F (36.2 °C) (Temporal)   Resp 18   Ht 165.1 cm (65\")   Wt (!) 140 kg (308 lb)   LMP  (LMP Unknown)   SpO2 96%   BMI 51.25 kg/m²         "

## 2022-12-29 NOTE — H&P
[facility] Gastroenterology  Pre Procedure History & Physical    Chief Complaint:   Gastric polyps    Subjective     HPI:   She has a history of adenomatous gastric polyps.  These were removed in May.  She had a follow-up endoscopy in November but her stomach was full of food and fluid.  She represents today for endoscopy evaluation.  She has been on a clear liquid diet she states for 2 days.  She denies any symptoms.    Past Medical History:   Past Medical History:   Diagnosis Date   • Acquired hypothyroidism 02/06/2017   • Allergic    • Anemia    • Anemia due to stage 4 chronic kidney disease ([facility]) 08/18/2020   • Anxiety    • Arthritis    • Cellulitis    • CHF (congestive heart failure) ([facility])    • Chronic kidney disease     3rd stage   • Depression    • Disease of thyroid gland    • Dyslipidemia    • Elevated cholesterol    • Essential hypertension    • Fatigue    • Gallbladder abscess    • Hearing loss    • History of transfusion    • Light headed    • Limited range of motion (ROM) of shoulder     right   • Obesity    • Palpitation    • Short of breath on exertion    • Sinusitis    • Sleep apnea    • Stage 4 chronic kidney disease ([facility]) 09/16/2020   • Type 2 diabetes mellitus ([facility])    • Type II diabetes mellitus, uncontrolled    • Wears glasses        Past Surgical History:  Past Surgical History:   Procedure Laterality Date   • ADENOIDECTOMY     • ANAL FISTULA REPAIR      x 2    • ARTERIOVENOUS FISTULA Left 08/2021   • CHOLECYSTECTOMY     • COLONOSCOPY  01/02/2014   • COLONOSCOPY N/A 03/22/2017    Procedure: COLONOSCOPY WITH ANESTHESIA;  Surgeon: Mono Linder MD;  Location: [facility] ENDOSCOPY;  Service:    • COLONOSCOPY N/A 05/09/2022    Procedure: COLONOSCOPY WITH ANESTHESIA;  Surgeon: Mono Linder MD;  Location: [facility] ENDOSCOPY;  Service: Gastroenterology;  Laterality: N/A;  pre polyp;brbpr  post  Dr. Soliman   • D & C HYSTEROSCOPY N/A 07/25/2018    Procedure: DILATATION AND CURETTAGE HYSTEROSCOPY;   Surgeon: Michael Castaneda MD;  Location: [facility] OR;  Service: Obstetrics/Gynecology   • DILATATION AND CURETTAGE      X 2   • ENDOSCOPY     • ENDOSCOPY N/A 05/09/2022    Procedure: ESOPHAGOGASTRODUODENOSCOPY WITH ANESTHESIA;  Surgeon: Mono Linder MD;  Location: [facility] ENDOSCOPY;  Service: Gastroenterology;  Laterality: N/A;  pre screen  post gastric polyps  Dr. Soliman   • ENDOSCOPY N/A 11/28/2022    Procedure: ESOPHAGOGASTRODUODENOSCOPY WITH ANESTHESIA;  Surgeon: Mono Linder MD;  Location: [facility] ENDOSCOPY;  Service: Gastroenterology;  Laterality: N/A;  pre: hx gastric polyp  post: Retained food, gastritis  dr sebastián soliman   • HYSTERECTOMY     • TONSILLECTOMY          Family History:  Family History   Problem Relation Age of Onset   • Diabetes Other    • Heart failure Other    • Cancer Other    • Kidney disease Other    • Lung cancer Mother    • Heart disease Father    • Diabetes Father    • Obesity Father    • Stroke Father    • Prostate cancer Father    • Cancer Maternal Grandmother    • Uterine cancer Maternal Grandmother    • Diabetes Maternal Grandfather    • Cancer Paternal Grandmother    • Colon cancer Paternal Grandmother    • Diabetes Paternal Grandfather    • Heart disease Paternal Grandfather    • No Known Problems Sister    • No Known Problems Brother    • No Known Problems Daughter    • No Known Problems Maternal Aunt    • No Known Problems Paternal Aunt    • BRCA 1/2 Neg Hx    • Breast cancer Neg Hx    • Endometrial cancer Neg Hx    • Ovarian cancer Neg Hx        Social History:   reports that she has never smoked. She has never used smokeless tobacco. She reports that she does not drink alcohol and does not use drugs.    Medications:   Prior to Admission medications    Medication Sig Start Date End Date Taking? Authorizing Provider   Insulin Disposable Pump (Omnipod 5 G6 Pod, Gen 5,) misc 1 each Every Other Day. 10/12/22  Yes Wilman Negrete MD   ONDANSETRON PO Take  by mouth  As Needed.   Yes Rk Hidalgo MD   Semaglutide, 2 MG/DOSE, (Ozempic, 2 MG/DOSE,) 8 MG/3ML solution pen-injector Inject 2 mg under the skin into the appropriate area as directed 1 (One) Time Per Week. Please cancel Trulicity 6/8/22  Yes Wilman Negrete MD   allopurinol (ZYLOPRIM) 100 MG tablet Take 1 tablet by mouth. 5/10/22   Rk Hidalgo MD   Ascorbic Acid (VITAMIN C PO) Take  by mouth Daily.    Rk Hidalgo MD   aspirin 81 MG tablet Take 81 mg by mouth Daily. Stop 7/22/2018    Rk Hidalgo MD   BD Insulin Syringe U/F 31G X 5/16\" 1 ML misc AS DIRECTED FOUR TIMES A DAY 5/29/21   Wilman Negrete MD   buPROPion SR (Wellbutrin SR) 100 MG 12 hr tablet Take 1 tablet by mouth 2 (Two) Times a Day. 9/21/22   Ole Soliman MD   busPIRone (BUSPAR) 10 MG tablet Take 2 tablets by mouth 2 (Two) Times a Day. 9/21/22   Ole Soliman MD   calcitriol (ROCALTROL) 0.5 MCG capsule Take 0.5 mcg by mouth Daily.    Rk Hidalgo MD   carvedilol (COREG) 6.25 MG tablet Take 1 tablet by mouth. 5/17/22   Rk Hidalgo MD   cetirizine (zyrTEC) 10 MG tablet Take 1 tablet by mouth. 5/10/22   Rk Hidalgo MD   citalopram (CeleXA) 40 MG tablet Take 1 tablet by mouth Daily. 8/11/22   Janet Richardson APRN   diazePAM (Valium) 5 MG tablet Take 1 tablet by mouth As Needed for Anxiety. 10/31/22   Ole Soliman MD   Docusate Calcium (STOOL SOFTENER PO) Take  by mouth 2 (Two) Times a Day.    Rk Hidalgo MD   epoetin elsa (EPOGEN,PROCRIT) 27258 UNIT/ML injection Inject 10,000 Units under the skin into the appropriate area as directed Every 14 (Fourteen) Days.    Rk Hidalgo MD   famotidine (PEPCID) 20 MG tablet Take 2 tablets by mouth 2 (Two) Times a Day. 11/28/22   Mono Linder MD   fluticasone (FLONASE) 50 MCG/ACT nasal spray 1 spray into the nostril(s) as directed by provider 2 (Two) Times a Day. 3/22/21   ProviderRk MD    insulin regular (HUMULIN R) 500 UNIT/ML CONCENTRATED injection Inject  under the skin into the appropriate area as directed 3 (Three) Times a Day Before Meals. 6/24/22   Wilman Negrete MD   Insulin Regular Human, Conc, (HumuLIN R U-500 KwikPen) 500 UNIT/ML solution pen-injector CONCENTRATED injection 150 units twice daily 3/2/22   Wilman Negrete MD   Insulin Regular Human, Conc, (HumuLIN R U-500 KwikPen) 500 UNIT/ML solution pen-injector CONCENTRATED injection 250 units qac tid 12/14/22   Wilman Negrete MD   lamoTRIgine (LaMICtal) 100 MG tablet TAKE ONE TABLET BY MOUTH EVERY DAY 10/19/22   Ole Soliman MD   levothyroxine (Synthroid) 100 MCG tablet Take 1 tablet by mouth Daily. 11/7/22 11/7/23  Wilman Negrete MD   Methoxy PEG-Epoetin Beta (MIRCERA IJ) 50 mcg Every 28 (Twenty-Eight) Days. 2/5/22 2/4/23  Rk Hidalgo MD   Misc. Devices (Bariatric Rollator) misc Please provide device for patient 11/11/22   Gwen Arenas APRN   multivitamin with minerals tablet tablet Take 1 tablet by mouth Daily.    Rk Hidalgo MD   mupirocin (BACTROBAN) 2 % ointment Apply  topically to the appropriate area as directed 3 (Three) Times a Day.  Patient taking differently: Apply 11 application topically to the appropriate area as directed As Needed. 4/6/21   Michele Wilkes PA   POTASSIUM PO Take  by mouth Daily.    Rk Hidalgo MD   Repatha solution prefilled syringe injection Inject 1 mL under theskin into the appropriate area as directed Every 14 (Fourteen) Days. 12/22/22   Vikas Brown APRN   rosuvastatin (CRESTOR) 20 MG tablet Take 1 tablet by mouth Every Night. 9/13/22   Ole oSliman MD   sevelamer (RENVELA) 800 MG tablet TAKE 2 TABLETS BY MOUTH THREE TIMES DAILY WITH MEALS AND 1 TABLET WITH A SNACK 10/21/22   Rk Hidalgo MD       Allergies:  Codeine    ROS:    General: Weight stable  Respiratory: No SOA  Cardiovascular: No  CP    Objective     Blood pressure 129/60, pulse 80, temperature 97.1 °F (36.2 °C), temperature source Temporal, resp. rate 18, height 165.1 cm (65\"), weight (!) 140 kg (308 lb), SpO2 97 %, not currently breastfeeding.    Physical Exam   Constitutional: Pt is oriented to person, place, and in no distress.   Cardiovascular: Normal rate, regular rhythm.    Pulmonary/Chest: Effort normal. No respiratory distress.    Abdominal: Nondistended.  Psychiatric: Mood, memory, affect and judgment appear normal.     Assessment & Plan     Diagnosis:  History of adenomatous gastric polyps    Anticipated Surgical Procedure:  Endoscopy    The risks, benefits, and alternatives of this procedure have been discussed with the patient or the responsible party- the patient understands and agrees to proceed.    EMR Dragon/transcription disclaimer:  Much of this encounter note is electronic transcription/translation of spoken language to printed text.  The electronic translation of spoken language may be erroneous, or at times, nonsensical words or phrases may be inadvertently transcribed.  Although I have reviewed the note for such errors, some may still exist.

## 2022-12-29 NOTE — ANESTHESIA PREPROCEDURE EVALUATION
Anesthesia Evaluation     Patient summary reviewed   no history of anesthetic complications:  NPO Solid Status: > 8 hours             Airway   Mallampati: II  Dental      Pulmonary    (+) sleep apnea on CPAP,   Cardiovascular   Exercise tolerance: poor (<4 METS)    (+) hypertension, CHF ,       Neuro/Psych- negative ROS  GI/Hepatic/Renal/Endo    (+) morbid obesity,  renal disease (last HD yesterday), diabetes mellitus (insulin pump and cgm) using insulin, thyroid problem hypothyroidism    Musculoskeletal     Abdominal    Substance History      OB/GYN          Other   blood dyscrasia anemia,                       Anesthesia Plan    ASA 4     MAC     intravenous induction     Anesthetic plan, risks, benefits, and alternatives have been provided, discussed and informed consent has been obtained with: patient and spouse/significant other.        CODE STATUS:

## 2022-12-30 LAB
CYTO UR: NORMAL
LAB AP CASE REPORT: NORMAL
Lab: NORMAL
PATH REPORT.FINAL DX SPEC: NORMAL
PATH REPORT.GROSS SPEC: NORMAL
UREASE TISS QL: NEGATIVE

## 2023-01-02 DIAGNOSIS — F41.9 ANXIETY: ICD-10-CM

## 2023-01-02 DIAGNOSIS — F32.1 MODERATE MAJOR DEPRESSION (HCC): Primary | ICD-10-CM

## 2023-01-04 RX ORDER — BUPROPION HYDROCHLORIDE 100 MG/1
TABLET, EXTENDED RELEASE ORAL
Qty: 180 TABLET | Refills: 1 | Status: SHIPPED | OUTPATIENT
Start: 2023-01-04

## 2023-01-04 NOTE — TELEPHONE ENCOUNTER
Deacon Marquisefrancisca called to request a refill on her medication.       Last office visit : 12/2/2022   Next office visit : 3/2/2023     Requested Prescriptions     Pending Prescriptions Disp Refills    buPROPion (WELLBUTRIN SR) 100 MG extended release tablet [Pharmacy Med Name: BUPROPION HCL  MG TABLET] 120 tablet 0     Sig: TAKE ONE TABLET [facility] 2 TIMES A DAY            Lore Hernández LPN

## 2023-01-06 ENCOUNTER — TELEPHONE (OUTPATIENT)
Dept: ENDOCRINOLOGY | Facility: CLINIC | Age: 63
End: 2023-01-06
Payer: MEDICARE

## 2023-01-06 DIAGNOSIS — N18.6 TYPE 2 DIABETES MELLITUS WITH ESRD (END-STAGE RENAL DISEASE): Primary | ICD-10-CM

## 2023-01-06 DIAGNOSIS — E11.22 TYPE 2 DIABETES MELLITUS WITH ESRD (END-STAGE RENAL DISEASE): Primary | ICD-10-CM

## 2023-01-06 RX ORDER — SEMAGLUTIDE 2.68 MG/ML
2 INJECTION, SOLUTION SUBCUTANEOUS WEEKLY
Qty: 3 ML | Refills: 3 | Status: SHIPPED | OUTPATIENT
Start: 2023-01-06

## 2023-01-06 NOTE — TELEPHONE ENCOUNTER
Incoming Refill Request      Medication requested (name and dose): Semaglutide, 2 MG/DOSE, (Ozempic, 2 MG/DOSE,) 8 MG/3ML solution pen-injector    [facility]    Additional details provided by patient:     Best call back number: [phone]    Does the patient have less than a 3 day supply:  [x] Yes  [] No    Osvaldo Fernandes Rep  01/06/23, 10:08 CST

## 2023-01-09 NOTE — PROGRESS NOTES
Pedro Brown ( 1960) is a 58 y.o. female, [facility]/Established here for evaluation of the following chief complaint(s).   Ingrown Toenail      Patient was encouraged and advised to be compliant with all  medications leads an active lifestyle and promote maintaining a healthy weight, encouraged not to use cigarettes, laboratory results discussed and reviewed with patient's during this visit   ASSESSMENT/PLAN:  Problem List          Other    Moderate major depression ([address]      Unclear control, changes made today: Due to the fact that she has a special child and sometimes she has anger issues I think she could benefit from the use of citalopram 40 mg daily, I had cautioned her to cut down her BuSpar dose and we will slowly remove the buccal primidone from her regimen and will reassess during her follow-up visit patient was receptive to this changes         Relevant Medications    citalopram (CELEXA) 40 MG tablet    diazePAM (VALIUM) 2 MG tablet    lamoTRIgine (LAMICTAL) 100 MG tablet    buPROPion (WELLBUTRIN SR) 150 MG extended release tablet    busPIRone (BUSPAR) 10 MG tablet    Great toe pain, left - Primary      Uncontrolled, Patient will be referred to podiatry         Relevant Orders    External Referral To Podiatry         SPENSER 7 SCORE 2022   SPENSER-7 Total Score 9       PHQ Scores 1/10/2023 2022   PHQ2 Score 2 4   PHQ9 Score 10 13       Results for orders placed or performed in visit on 22   Electrophoresis Protein, Serum   Result Value Ref Range    Total Protein 6.6 6.3 - 8.2 g/dL    Albumin 3.48 (L) 3.75 - 5.01 g/dL    Alpha-1-Globulin 0.43 0.19 - 0.46 g/dL    Alpha-2-Globulin 1.15 (H) 0.48 - 1.05 g/dL    Beta Globulin 0.72 0.48 - 1.10 g/dL    Gamma Globulin 0.83 0.62 - 1.51 g/dL    SPE/CELIA Interpretation See Note     Protein Electrophoresis, Serum See Note    Ferritin   Result Value Ref Range    Ferritin >2,000.0 (H) 13.0 - 150.0 ng/mL   Iron and TIBC   Result Value Ref Range    Iron 63 37 - 145 ug/dL    TIBC 222 (L) 250 - 400 ug/dL    Iron Saturation 28 14 - 50 %       No follow-ups on file.     HPI  61-year-old female who is known to have end-stage renal disease probably due to type 2 diabetes is here due to persistent left big toenail pain  There is a progressive deformity of her left big toenail and is increasingly painful for her to wear her shoes she does not have a wound there was no it was not head it is just the nailbed that hurts and she would like a referral to podiatry  Patient was also requesting for review of her BuSpar and since it was a problem focused visit I took the time to focus on her medications  Here today in symptoms only sent in for her I felt I felt that perhaps she was on too much SSRI it I felt that she was on too much SSRI  Review of Systems    Physical Exam      (Time Documentation [id number])    An electronic signaturewaas used to authenticate this note  -Becky Dhillon MD on 1/11/2023 at 2:06 PM

## 2023-01-10 ENCOUNTER — OFFICE VISIT (OUTPATIENT)
Dept: PRIMARY CARE CLINIC | Age: 63
End: 2023-01-10
Payer: MEDICARE

## 2023-01-10 VITALS
WEIGHT: 293 LBS | RESPIRATION RATE: 20 BRPM | BODY MASS INDEX: 48.82 KG/M2 | TEMPERATURE: 97.6 F | DIASTOLIC BLOOD PRESSURE: 64 MMHG | SYSTOLIC BLOOD PRESSURE: 120 MMHG | OXYGEN SATURATION: 99 % | HEART RATE: 71 BPM | HEIGHT: 65 IN

## 2023-01-10 DIAGNOSIS — M79.675 GREAT TOE PAIN, LEFT: Primary | ICD-10-CM

## 2023-01-10 DIAGNOSIS — F32.1 MODERATE MAJOR DEPRESSION (HCC): ICD-10-CM

## 2023-01-10 PROCEDURE — 3074F SYST BP LT 130 MM HG: CPT | Performed by: INTERNAL MEDICINE

## 2023-01-10 PROCEDURE — 3078F DIAST BP <80 MM HG: CPT | Performed by: INTERNAL MEDICINE

## 2023-01-10 PROCEDURE — 99213 OFFICE O/P EST LOW 20 MIN: CPT | Performed by: INTERNAL MEDICINE

## 2023-01-10 RX ORDER — BUPROPION HYDROCHLORIDE 150 MG/1
150 TABLET, EXTENDED RELEASE ORAL DAILY
Qty: 90 TABLET | Refills: 1 | Status: SHIPPED | OUTPATIENT
Start: 2023-01-10 | End: 2023-04-10

## 2023-01-10 RX ORDER — BUSPIRONE HYDROCHLORIDE 10 MG/1
10 TABLET ORAL 2 TIMES DAILY
Qty: 180 TABLET | Refills: 1 | Status: SHIPPED | OUTPATIENT
Start: 2023-01-10 | End: 2023-04-10

## 2023-01-10 RX ORDER — LAMOTRIGINE 100 MG/1
100 TABLET ORAL NIGHTLY
Qty: 90 TABLET | Refills: 1 | Status: SHIPPED | OUTPATIENT
Start: 2023-01-10

## 2023-01-10 ASSESSMENT — PATIENT HEALTH QUESTIONNAIRE - PHQ9
3. TROUBLE FALLING OR STAYING ASLEEP: 3
SUM OF ALL RESPONSES TO PHQ QUESTIONS 1-9: 10
2. FEELING DOWN, DEPRESSED OR HOPELESS: 1
SUM OF ALL RESPONSES TO PHQ QUESTIONS 1-9: 10
6. FEELING BAD ABOUT YOURSELF - OR THAT YOU ARE A FAILURE OR HAVE LET YOURSELF OR YOUR FAMILY DOWN: 1
5. POOR APPETITE OR OVEREATING: 0
4. FEELING TIRED OR HAVING LITTLE ENERGY: 3
8. MOVING OR SPEAKING SO SLOWLY THAT OTHER PEOPLE COULD HAVE NOTICED. OR THE OPPOSITE, BEING SO FIGETY OR RESTLESS THAT YOU HAVE BEEN MOVING AROUND A LOT MORE THAN USUAL: 1
7. TROUBLE CONCENTRATING ON THINGS, SUCH AS READING THE NEWSPAPER OR WATCHING TELEVISION: 0
9. THOUGHTS THAT YOU WOULD BE BETTER OFF DEAD, OR OF HURTING YOURSELF: 0
10. IF YOU CHECKED OFF ANY PROBLEMS, HOW DIFFICULT HAVE THESE PROBLEMS MADE IT FOR YOU TO DO YOUR WORK, TAKE CARE OF THINGS AT HOME, OR GET ALONG WITH OTHER PEOPLE: 1
SUM OF ALL RESPONSES TO PHQ9 QUESTIONS 1 & 2: 2
1. LITTLE INTEREST OR PLEASURE IN DOING THINGS: 1
SUM OF ALL RESPONSES TO PHQ QUESTIONS 1-9: 10
SUM OF ALL RESPONSES TO PHQ QUESTIONS 1-9: 10

## 2023-01-11 PROBLEM — M79.675 GREAT TOE PAIN, LEFT: Status: ACTIVE | Noted: 2023-01-11

## 2023-01-11 NOTE — ASSESSMENT & PLAN NOTE
Unclear control, changes made today: Due to the fact that she has a special child and sometimes she has anger issues I think she could benefit from the use of citalopram 40 mg daily, I had cautioned her to cut down her BuSpar dose and we will slowly remove the buccal primidone from her regimen and will reassess during her follow-up visit patient was receptive to this changes

## 2023-01-16 ENCOUNTER — TELEPHONE (OUTPATIENT)
Dept: ENDOCRINOLOGY | Facility: CLINIC | Age: 63
End: 2023-01-16
Payer: MEDICARE

## 2023-01-16 NOTE — TELEPHONE ENCOUNTER
Pt called, and is requesting her lab orders be sent in please, prior to her appt.    Pt callback number [phone]

## 2023-01-17 DIAGNOSIS — N18.6 TYPE 2 DIABETES MELLITUS WITH ESRD (END-STAGE RENAL DISEASE): Primary | ICD-10-CM

## 2023-01-17 DIAGNOSIS — E11.22 TYPE 2 DIABETES MELLITUS WITH ESRD (END-STAGE RENAL DISEASE): Primary | ICD-10-CM

## 2023-01-17 DIAGNOSIS — D50.0 IRON DEFICIENCY ANEMIA DUE TO CHRONIC BLOOD LOSS: ICD-10-CM

## 2023-01-17 DIAGNOSIS — E03.9 ACQUIRED HYPOTHYROIDISM: ICD-10-CM

## 2023-01-17 DIAGNOSIS — E53.8 B12 DEFICIENCY: ICD-10-CM

## 2023-02-09 DIAGNOSIS — N18.6 ANEMIA IN END-STAGE RENAL DISEASE (HCC): ICD-10-CM

## 2023-02-09 DIAGNOSIS — D63.1 ANEMIA IN END-STAGE RENAL DISEASE (HCC): ICD-10-CM

## 2023-02-09 RX ORDER — SODIUM CHLORIDE 0.9 % (FLUSH) 0.9 %
5-40 SYRINGE (ML) INJECTION PRN
Status: CANCELLED | OUTPATIENT
Start: 2023-02-10

## 2023-02-09 RX ORDER — ALBUTEROL SULFATE 90 UG/1
4 AEROSOL, METERED RESPIRATORY (INHALATION) PRN
OUTPATIENT
Start: 2023-02-10

## 2023-02-09 RX ORDER — DIPHENHYDRAMINE HYDROCHLORIDE 50 MG/ML
25 INJECTION INTRAMUSCULAR; INTRAVENOUS ONCE
Status: CANCELLED | OUTPATIENT
Start: 2023-02-10 | End: 2023-02-10

## 2023-02-09 RX ORDER — METHYLPREDNISOLONE SODIUM SUCCINATE 125 MG/2ML
125 INJECTION, POWDER, LYOPHILIZED, FOR SOLUTION INTRAMUSCULAR; INTRAVENOUS ONCE
Status: CANCELLED
Start: 2023-02-10 | End: 2023-02-10

## 2023-02-09 RX ORDER — ACETAMINOPHEN 325 MG/1
650 TABLET ORAL ONCE
Status: CANCELLED | OUTPATIENT
Start: 2023-02-10 | End: 2023-02-10

## 2023-02-09 RX ORDER — SODIUM CHLORIDE 9 MG/ML
20 INJECTION, SOLUTION INTRAVENOUS CONTINUOUS
Status: CANCELLED | OUTPATIENT
Start: 2023-02-10

## 2023-02-09 RX ORDER — DIPHENHYDRAMINE HYDROCHLORIDE 50 MG/ML
50 INJECTION INTRAMUSCULAR; INTRAVENOUS
OUTPATIENT
Start: 2023-02-10

## 2023-02-09 RX ORDER — ACETAMINOPHEN 325 MG/1
650 TABLET ORAL
OUTPATIENT
Start: 2023-02-10

## 2023-02-09 RX ORDER — ONDANSETRON 2 MG/ML
8 INJECTION INTRAMUSCULAR; INTRAVENOUS
OUTPATIENT
Start: 2023-02-10

## 2023-02-09 RX ORDER — EPINEPHRINE 1 MG/ML
0.3 INJECTION, SOLUTION, CONCENTRATE INTRAVENOUS PRN
OUTPATIENT
Start: 2023-02-10

## 2023-02-09 RX ORDER — SODIUM CHLORIDE 9 MG/ML
INJECTION, SOLUTION INTRAVENOUS CONTINUOUS
OUTPATIENT
Start: 2023-02-10

## 2023-02-10 ENCOUNTER — HOSPITAL ENCOUNTER (OUTPATIENT)
Dept: INFUSION THERAPY | Age: 63
Setting detail: INFUSION SERIES
Discharge: HOME | End: 2023-02-10
Payer: MEDICARE

## 2023-02-10 VITALS
TEMPERATURE: 97.3 F | HEART RATE: 72 BPM | OXYGEN SATURATION: 94 % | RESPIRATION RATE: 16 BRPM | DIASTOLIC BLOOD PRESSURE: 55 MMHG | SYSTOLIC BLOOD PRESSURE: 100 MMHG

## 2023-02-10 DIAGNOSIS — N18.6 ANEMIA IN END-STAGE RENAL DISEASE (HCC): Primary | ICD-10-CM

## 2023-02-10 DIAGNOSIS — D63.1 ANEMIA IN END-STAGE RENAL DISEASE (HCC): Primary | ICD-10-CM

## 2023-02-10 LAB
ABO/RH: NORMAL
ANTIBODY SCREEN: NORMAL
BLOOD BANK DISPENSE STATUS: NORMAL
BLOOD BANK PRODUCT CODE: NORMAL
BPU ID: NORMAL
DESCRIPTION BLOOD BANK: NORMAL

## 2023-02-10 PROCEDURE — 96374 THER/PROPH/DIAG INJ IV PUSH: CPT

## 2023-02-10 PROCEDURE — 6370000000 HC RX 637 (ALT 250 FOR IP): Performed by: CLINICAL NURSE SPECIALIST

## 2023-02-10 PROCEDURE — 2580000003 HC RX 258: Performed by: CLINICAL NURSE SPECIALIST

## 2023-02-10 PROCEDURE — 86901 BLOOD TYPING SEROLOGIC RH(D): CPT

## 2023-02-10 PROCEDURE — 6360000002 HC RX W HCPCS: Performed by: CLINICAL NURSE SPECIALIST

## 2023-02-10 PROCEDURE — 36430 TRANSFUSION BLD/BLD COMPNT: CPT

## 2023-02-10 PROCEDURE — 96375 TX/PRO/DX INJ NEW DRUG ADDON: CPT

## 2023-02-10 PROCEDURE — P9016 RBC LEUKOCYTES REDUCED: HCPCS

## 2023-02-10 PROCEDURE — 86900 BLOOD TYPING SEROLOGIC ABO: CPT

## 2023-02-10 PROCEDURE — 86850 RBC ANTIBODY SCREEN: CPT

## 2023-02-10 PROCEDURE — 86923 COMPATIBILITY TEST ELECTRIC: CPT

## 2023-02-10 RX ORDER — METHYLPREDNISOLONE SODIUM SUCCINATE 125 MG/2ML
125 INJECTION, POWDER, LYOPHILIZED, FOR SOLUTION INTRAMUSCULAR; INTRAVENOUS ONCE
Start: 2023-02-10 | End: 2023-02-10

## 2023-02-10 RX ORDER — ALBUTEROL SULFATE 90 UG/1
4 AEROSOL, METERED RESPIRATORY (INHALATION) PRN
OUTPATIENT
Start: 2023-02-10

## 2023-02-10 RX ORDER — SODIUM CHLORIDE 0.9 % (FLUSH) 0.9 %
5-40 SYRINGE (ML) INJECTION PRN
OUTPATIENT
Start: 2023-02-10

## 2023-02-10 RX ORDER — SODIUM CHLORIDE 9 MG/ML
INJECTION, SOLUTION INTRAVENOUS CONTINUOUS
OUTPATIENT
Start: 2023-02-10

## 2023-02-10 RX ORDER — SODIUM CHLORIDE 9 MG/ML
20 INJECTION, SOLUTION INTRAVENOUS CONTINUOUS
OUTPATIENT
Start: 2023-02-10

## 2023-02-10 RX ORDER — DIPHENHYDRAMINE HYDROCHLORIDE 50 MG/ML
25 INJECTION INTRAMUSCULAR; INTRAVENOUS ONCE
Status: COMPLETED | OUTPATIENT
Start: 2023-02-10 | End: 2023-02-10

## 2023-02-10 RX ORDER — ACETAMINOPHEN 325 MG/1
650 TABLET ORAL ONCE
OUTPATIENT
Start: 2023-02-10 | End: 2023-02-10

## 2023-02-10 RX ORDER — DIPHENHYDRAMINE HYDROCHLORIDE 50 MG/ML
25 INJECTION INTRAMUSCULAR; INTRAVENOUS ONCE
OUTPATIENT
Start: 2023-02-10 | End: 2023-02-10

## 2023-02-10 RX ORDER — SODIUM CHLORIDE 9 MG/ML
20 INJECTION, SOLUTION INTRAVENOUS CONTINUOUS
Status: DISCONTINUED | OUTPATIENT
Start: 2023-02-10 | End: 2023-02-12 | Stop reason: HOSPADM

## 2023-02-10 RX ORDER — ACETAMINOPHEN 325 MG/1
650 TABLET ORAL ONCE
Status: COMPLETED | OUTPATIENT
Start: 2023-02-10 | End: 2023-02-10

## 2023-02-10 RX ORDER — EPINEPHRINE 1 MG/ML
0.3 INJECTION, SOLUTION, CONCENTRATE INTRAVENOUS PRN
OUTPATIENT
Start: 2023-02-10

## 2023-02-10 RX ORDER — SODIUM CHLORIDE 0.9 % (FLUSH) 0.9 %
5-40 SYRINGE (ML) INJECTION PRN
Status: DISCONTINUED | OUTPATIENT
Start: 2023-02-10 | End: 2023-02-11 | Stop reason: HOSPADM

## 2023-02-10 RX ORDER — DIPHENHYDRAMINE HYDROCHLORIDE 50 MG/ML
50 INJECTION INTRAMUSCULAR; INTRAVENOUS
OUTPATIENT
Start: 2023-02-10

## 2023-02-10 RX ORDER — ONDANSETRON 2 MG/ML
8 INJECTION INTRAMUSCULAR; INTRAVENOUS
OUTPATIENT
Start: 2023-02-10

## 2023-02-10 RX ORDER — METHYLPREDNISOLONE SODIUM SUCCINATE 125 MG/2ML
125 INJECTION, POWDER, LYOPHILIZED, FOR SOLUTION INTRAMUSCULAR; INTRAVENOUS ONCE
Status: COMPLETED | OUTPATIENT
Start: 2023-02-10 | End: 2023-02-10

## 2023-02-10 RX ORDER — ACETAMINOPHEN 325 MG/1
650 TABLET ORAL
OUTPATIENT
Start: 2023-02-10

## 2023-02-10 RX ADMIN — ACETAMINOPHEN 650 MG: 325 TABLET ORAL at 07:22

## 2023-02-10 RX ADMIN — SODIUM CHLORIDE 20 ML/HR: 9 INJECTION, SOLUTION INTRAVENOUS at 07:26

## 2023-02-10 RX ADMIN — METHYLPREDNISOLONE SODIUM SUCCINATE 125 MG: 125 INJECTION, POWDER, FOR SOLUTION INTRAMUSCULAR; INTRAVENOUS at 07:21

## 2023-02-10 RX ADMIN — DIPHENHYDRAMINE HYDROCHLORIDE 25 MG: 50 INJECTION, SOLUTION INTRAMUSCULAR; INTRAVENOUS at 07:22

## 2023-02-10 NOTE — DISCHARGE INSTRUCTIONS
Learning About Blood Transfusions  What is a blood transfusion? Blood transfusion is a medical treatment to replace the blood or parts of blood that your body has lost. The blood goes through a tube from a bag to an intravenous (IV) catheter and into your vein. You may need a blood transfusion after losing blood from an injury, a major surgery, an illness that causes bleeding, or an illness that destroys blood cells. Transfusions are also used to give you the parts of blood--such as platelets, plasma, or substances that cause clotting--that your body needs to fight an illness or stop bleeding. How is a blood transfusion done? Before you receive a blood transfusion, your blood is tested to find out what your blood type is. Blood or blood parts that are a match with your blood type are ordered by your doctor. Blood is typed as A, B, AB, or O. It is also typed as Rh-positive or Rh-negative. Your blood is also screened to look for antibodies that might react with the blood that is given to you. The blood you are getting is checked and rechecked to make sure that it's the right type for you. A sample of your blood is mixed with a sample of the blood you will receive to check for problems. Before actually giving you the transfusion, a doctor and nurses will look at the label on the package of blood and compare it to your hospital ID bracelet and medical records. The transfusion begins only when all agree that this is the correct blood and that you are the correct person to receive it. To receive the transfusion, you will have an intravenous (IV) catheter inserted into a vein. A tube connects the catheter to the bag containing the blood, which is placed higher than your body. The blood then flows slowly into your vein. A doctor or nurse will check you several times during the transfusion to watch for a reaction or other problems. What are the possible risks?   Blood transfusions have many benefits and are often life-saving. But they also have a few risks. Possible risks include: Your body's reaction to receiving new blood. This may include:  Fever. Breathing problems. Allergic reaction, such as hives, swelling, or a new rash. An infection from the blood. This risk is small because of the strict rules placed on handling and storing blood. Getting a viral infection, such as HIV or hepatitis B or C, through blood transfusions has become very rare. The U.S. Food and Drug Administration (FDA) enforces strict guidelines on the collection, testing, storage, and use of blood. Getting the wrong blood type by accident. Severe reactions, which can be life-threatening, are very rare. An infection at the transfusion site, such as redness, swelling, pain, bleeding, or pus. How can you care for yourself at home? To prevent infection at the transfusion site  Wash the area daily with warm, soapy water, and pat it dry. Don't use hydrogen peroxide or alcohol, which can slow healing. You may cover the area with a gauze bandage if it weeps or rubs against clothing. Change the bandage every day. Keep the area clean and dry. When should you call for help? Call 911 anytime you think you may need emergency care. For example, call if:  You have severe trouble breathing. Call your doctor now or seek immediate medical care if:  You have signs of an allergic reaction, such as hives, swelling, or a new rash. You have a fever. You feel weaker or more tired than usual.  You have a yellow tint (jaundice) to your skin or the whites of your eyes. You have nausea, vomiting, or diarrhea. You have signs of an infection at the transfusion site, such as redness, swelling, pain, bleeding, or pus. Watch closely for changes in your health, and be sure to contact your doctor if you have any problems. Follow-up care is a key part of your treatment and safety.  Be sure to make and go to all appointments, and call your doctor if you are having problems. It's also a good idea to know your test results and keep a list of the medicines you take. Where can you learn more? Go to http://www.woods.com/ and enter V588 to learn more about \"Learning About Blood Transfusions. \"  Current as of: September 8, 2022               Content Version: 13.5  © 2006-2022 [employer]. Care instructions adapted under license by [employer]. If you have questions about a medical condition or this instruction, always ask your healthcare professional. [address] any warranty or liability for your use of this information.

## 2023-02-20 ENCOUNTER — HOSPITAL ENCOUNTER (EMERGENCY)
Age: 63
Discharge: HOME | End: 2023-02-20
Attending: FAMILY MEDICINE
Payer: MEDICARE

## 2023-02-20 ENCOUNTER — APPOINTMENT (OUTPATIENT)
Dept: CT IMAGING | Age: 63
End: 2023-02-20
Payer: MEDICARE

## 2023-02-20 VITALS
RESPIRATION RATE: 23 BRPM | SYSTOLIC BLOOD PRESSURE: 201 MMHG | DIASTOLIC BLOOD PRESSURE: 64 MMHG | OXYGEN SATURATION: 100 % | WEIGHT: 293 LBS | TEMPERATURE: 97.9 F | HEART RATE: 98 BPM | HEIGHT: 65 IN | BODY MASS INDEX: 48.82 KG/M2

## 2023-02-20 DIAGNOSIS — R10.9 ABDOMINAL PAIN, UNSPECIFIED ABDOMINAL LOCATION: Primary | ICD-10-CM

## 2023-02-20 LAB
ALBUMIN SERPL-MCNC: 3.9 G/DL (ref 3.5–5.2)
ALP BLD-CCNC: 69 U/L (ref 35–104)
ALT SERPL-CCNC: 7 U/L (ref 5–33)
ANION GAP SERPL CALCULATED.3IONS-SCNC: 25 MMOL/L (ref 7–19)
AST SERPL-CCNC: 10 U/L (ref 5–32)
BASOPHILS ABSOLUTE: 0 K/UL (ref 0–0.2)
BASOPHILS RELATIVE PERCENT: 0.2 % (ref 0–1)
BILIRUB SERPL-MCNC: 0.3 MG/DL (ref 0.2–1.2)
BUN BLDV-MCNC: 67 MG/DL (ref 8–23)
CALCIUM SERPL-MCNC: 10.3 MG/DL (ref 8.8–10.2)
CHLORIDE BLD-SCNC: 90 MMOL/L (ref 98–111)
CO2: 21 MMOL/L (ref 22–29)
CREAT SERPL-MCNC: 10.3 MG/DL (ref 0.5–0.9)
EOSINOPHILS ABSOLUTE: 0 K/UL (ref 0–0.6)
EOSINOPHILS RELATIVE PERCENT: 0.1 % (ref 0–5)
GFR SERPL CREATININE-BSD FRML MDRD: 4 ML/MIN/{1.73_M2}
GLUCOSE BLD-MCNC: 256 MG/DL (ref 74–109)
HCT VFR BLD CALC: 25.8 % (ref 37–47)
HEMOGLOBIN: 8.5 G/DL (ref 12–16)
IMMATURE GRANULOCYTES #: 0.1 K/UL
LIPASE: 24 U/L (ref 13–60)
LYMPHOCYTES ABSOLUTE: 0.8 K/UL (ref 1.1–4.5)
LYMPHOCYTES RELATIVE PERCENT: 5 % (ref 20–40)
MCH RBC QN AUTO: 34.3 PG (ref 27–31)
MCHC RBC AUTO-ENTMCNC: 32.9 G/DL (ref 33–37)
MCV RBC AUTO: 104 FL (ref 81–99)
MONOCYTES ABSOLUTE: 0.3 K/UL (ref 0–0.9)
MONOCYTES RELATIVE PERCENT: 1.7 % (ref 0–10)
NEUTROPHILS ABSOLUTE: 14.3 K/UL (ref 1.5–7.5)
NEUTROPHILS RELATIVE PERCENT: 92.2 % (ref 50–65)
PDW BLD-RTO: 15.9 % (ref 11.5–14.5)
PLATELET # BLD: 221 K/UL (ref 130–400)
PMV BLD AUTO: 9.4 FL (ref 9.4–12.3)
POTASSIUM SERPL-SCNC: 4 MMOL/L (ref 3.5–5)
RBC # BLD: 2.48 M/UL (ref 4.2–5.4)
SODIUM BLD-SCNC: 136 MMOL/L (ref 136–145)
TOTAL PROTEIN: 7.6 G/DL (ref 6.6–8.7)
WBC # BLD: 15.5 K/UL (ref 4.8–10.8)

## 2023-02-20 PROCEDURE — 6360000002 HC RX W HCPCS: Performed by: FAMILY MEDICINE

## 2023-02-20 PROCEDURE — 80053 COMPREHEN METABOLIC PANEL: CPT

## 2023-02-20 PROCEDURE — 36415 COLL VENOUS BLD VENIPUNCTURE: CPT

## 2023-02-20 PROCEDURE — 85025 COMPLETE CBC W/AUTO DIFF WBC: CPT

## 2023-02-20 PROCEDURE — 96374 THER/PROPH/DIAG INJ IV PUSH: CPT

## 2023-02-20 PROCEDURE — 83690 ASSAY OF LIPASE: CPT

## 2023-02-20 PROCEDURE — 96375 TX/PRO/DX INJ NEW DRUG ADDON: CPT

## 2023-02-20 PROCEDURE — 99284 EMERGENCY DEPT VISIT MOD MDM: CPT

## 2023-02-20 PROCEDURE — 74176 CT ABD & PELVIS W/O CONTRAST: CPT

## 2023-02-20 RX ORDER — HYDROMORPHONE HYDROCHLORIDE 1 MG/ML
1 INJECTION, SOLUTION INTRAMUSCULAR; INTRAVENOUS; SUBCUTANEOUS ONCE
Status: COMPLETED | OUTPATIENT
Start: 2023-02-20 | End: 2023-02-20

## 2023-02-20 RX ORDER — ONDANSETRON 2 MG/ML
4 INJECTION INTRAMUSCULAR; INTRAVENOUS ONCE
Status: COMPLETED | OUTPATIENT
Start: 2023-02-20 | End: 2023-02-20

## 2023-02-20 RX ADMIN — ONDANSETRON 4 MG: 2 INJECTION INTRAMUSCULAR; INTRAVENOUS at 07:44

## 2023-02-20 RX ADMIN — HYDROMORPHONE HYDROCHLORIDE 1 MG: 1 INJECTION, SOLUTION INTRAMUSCULAR; INTRAVENOUS; SUBCUTANEOUS at 07:44

## 2023-02-20 ASSESSMENT — ENCOUNTER SYMPTOMS
SORE THROAT: 0
BACK PAIN: 0
TROUBLE SWALLOWING: 0
APNEA: 0
CONSTIPATION: 0
SHORTNESS OF BREATH: 0
CHEST TIGHTNESS: 0
ABDOMINAL PAIN: 1
WHEEZING: 0
DIARRHEA: 0
ABDOMINAL DISTENTION: 0
COUGH: 0
VOMITING: 1

## 2023-02-20 ASSESSMENT — PAIN DESCRIPTION - LOCATION
LOCATION: ABDOMEN;FLANK
LOCATION: ABDOMEN

## 2023-02-20 ASSESSMENT — PAIN DESCRIPTION - ORIENTATION
ORIENTATION: RIGHT
ORIENTATION: RIGHT

## 2023-02-20 ASSESSMENT — PAIN SCALES - GENERAL
PAINLEVEL_OUTOF10: 10
PAINLEVEL_OUTOF10: 10

## 2023-02-20 ASSESSMENT — PAIN - FUNCTIONAL ASSESSMENT: PAIN_FUNCTIONAL_ASSESSMENT: 0-10

## 2023-02-20 NOTE — ED PROVIDER NOTES
[facility] EMERGENCY DEPT  eMERGENCY dEPARTMENT eNCOUnter      Pt Name: Don Dia  MRN: [id number]  [place] 1960  Date of evaluation: 2/20/2023  Provider: Felix Ruiz MD    [address] [place] [address]       Chief Complaint   Patient presents with    Flank Pain    Abdominal Pain    Emesis    Nausea         HISTORY OF PRESENT ILLNESS   (Location/Symptom, Timing/Onset,Context/Setting, Quality, Duration, Modifying Factors, Severity)  Note limiting factors. Don Dia is a 58 y.o. female who presents to the emergency department patient presents with left flank pain, vomiting. She also states she had a mild nosebleed. She states the pain also radiates to the right side. HPI    NursingNotes were reviewed. REVIEW OF SYSTEMS    (2-9 systems for level 4, 10 or more for level 5)     Review of Systems   Constitutional:  Negative for diaphoresis and fever. HENT:  Negative for sore throat and trouble swallowing. Eyes:  Negative for visual disturbance. Respiratory:  Negative for apnea, cough, chest tightness, shortness of breath and wheezing. Cardiovascular:  Negative for chest pain, palpitations and leg swelling. Gastrointestinal:  Positive for abdominal pain and vomiting. Negative for abdominal distention, constipation and diarrhea. Musculoskeletal:  Negative for back pain and myalgias. Skin:  Negative for pallor. Neurological:  Negative for dizziness, weakness, light-headedness and headaches. Psychiatric/Behavioral:  Negative for behavioral problems and suicidal ideas. All other systems reviewed and are negative.          PAST MEDICALHISTORY     Past Medical History:   Diagnosis Date    Acute hemodialysis encounter ([facility] 8/10/2021    Acute hypoxemic respiratory failure ([facility] 8/10/2021    Anemia in end-stage renal disease ([facility] 2/9/2023    Anxiety     Arthritis     CHF (congestive heart failure) (HCC)     CKD (chronic kidney disease)     stage 4    Colon polyp     Depression     Embolism - blood clot 2004    Hemodialysis patient ([facility]     5 days a week at home, takes Sunday + one other day off each week    Hx of blood clots     Hyperlipidemia     Hypertension     Hypert[facility] associated with diabetes [facility] 10/17/2016    Obesity, morbid, BMI 50 or higher ([facility]     Sleep apnea     CPAP    Thyroid disease     Type II or unspecified type diabetes mellitus without mention of complication, not stated as uncontrolled          SURGICAL HISTORY       Past Surgical History:   Procedure Laterality Date    CHOLECYSTECTOMY, LAPAROSCOPIC  1986    DIALYSIS CATHETER INSERTION N/A 08/11/2021    INSERTION CATHETER DIALYSIS performed by Aditya Trevino MD at [place] [id number] Left 06/18/2021    LEFT BRACHIAL-CEPHALIC AV FISTULA CREATION performed by Aditya Trevino MD at [address] 07/27/2021    LEFT UPPER EXTREMITY CEPHALIC VEIN SUPERFICIALIZATION performed by Aditya Trevino MD at [address] [place] [address]  2005    FISTULAGRAM Left 09/30/2022    LEFT UPPER EXTREMITY AV FISTULAGRAM, POSSIBLE BALLOON ANGIOPLASTY, STENT POSS REVISION performed by Aditya Trevino MD at [facility] ([address] [place] [address])  10/17/2018    POLYPECTOMY      ~2012 had colon polyps, 2022 had a benign polyp and 2 possibly cancerous polyps, DUE FOR REPEAT IN DEC 2022    RECTAL SURGERY  1981    fistula repair    TONSILLECTOMY AND ADENOIDECTOMY      at age 12 years    VASCULAR SURGERY  07/12/2021    [facility]- Ultrasound-guided cannulation left distal upper arm cephalic vein with 4 Citizen of [place] glide sheath, Left upper extremity fistulogram's including venography the superior vena cava    VASCULAR SURGERY  07/27/2021    [facility]- Superficialization of the left upper arm cephalic vein arteriovenous fistula         CURRENT MEDICATIONS     Previous Medications    ALLOPURINOL (ZYLOPRIM) 100 MG TABLET    Take 100 mg by mouth 2 times daily    ASPIRIN 81 MG TABLET    Take 81 mg by mouth daily With [facility] BUPROPION (WELLBUTRIN SR) 150 MG EXTENDED RELEASE TABLET    Take 1 tablet by mouth daily    BUSPIRONE (BUSPAR) 10 MG TABLET    Take 1 tablet by mouth 2 times daily    CALCITRIOL (ROCALTROL) 0.5 MCG CAPSULE    Take 0.5 mcg by mouth daily    CARVEDILOL (COREG) 6.25 MG TABLET    Take 6.25 mg by mouth 2 times daily (with meals)    CETIRIZINE (ZYRTEC) 10 MG TABLET    Take 10 mg by mouth as needed for Allergies     CITALOPRAM (CELEXA) 40 MG TABLET    Take 40 mg by mouth daily    DIAZEPAM (VALIUM) 2 MG TABLET    Take 2 mg by mouth as needed for Anxiety.      DOCUSATE SODIUM (COLACE) 100 MG CAPSULE    Take 1 capsule by mouth 2 times daily    EPOETIN WEI (EPOGEN;PROCRIT) 65483 UNIT/ML INJECTION    Inject 10,000 Units into the skin every 14 days    EVOLOCUMAB (REPATHA SC)    Inject into the skin every 14 days    FLUTICASONE (FLONASE) 50 MCG/ACT NASAL SPRAY    2 sprays by Nasal route daily as needed for Rhinitis     INSULIN REGULAR HUMAN (HUMULIN R U-500 KWIKPEN SC)    Inject into the skin OMNI POD 1 UNIT PER HOUR AND BOLUS 25 UNITS Q AM, 15 UNITS AT HS    LAMOTRIGINE (LAMICTAL) 100 MG TABLET    Take 1 tablet by mouth nightly    LEVOTHYROXINE (SYNTHROID) 88 MCG TABLET    Take 88 mcg by mouth Daily     ONDANSETRON (ZOFRAN ODT) 4 MG DISINTEGRATING TABLET    Take 1 tablet by mouth every 8 hours as needed for Nausea or Vomiting    POTASSIUM CHLORIDE (KLOR-CON) 20 MEQ PACKET    Take 20 mEq by mouth daily    ROSUVASTATIN (CRESTOR) 20 MG TABLET    Take 20 mg by mouth daily     SEMAGLUTIDE,0.25 OR 0.5MG/DOS, (OZEMPIC, 0.25 OR 0.5 MG/DOSE,) 2 MG/1.5ML SOPN    Inject 2 mg into the skin once a week    SUCROFERRIC OXYHYDROXIDE (VELPHORO) 500 MG CHEW    Take 2 tablets by mouth 3 times daily (before meals)    VITAMIN C (ASCORBIC ACID) 500 MG TABLET    Take 500 mg by mouth 2 times daily       ALLERGIES     Codeine    FAMILY HISTORY       Family History   Problem Relation Age of Onset    Lung Cancer Mother     Brain Cancer Mother     Heart Attack Father     Prostate Cancer Father     Heart Disease Father     Stroke Father     Obesity Father     No Known Problems Brother     Uterine Cancer Maternal Grandmother     Cervical Cancer Maternal Grandmother     Diabetes Maternal Grandfather     Other Maternal Grandfather         histoplasmosis    Obesity Paternal Grandmother     Diabetes Paternal Grandfather     Kidney Disease Paternal Grandfather     Other Daughter         Sneha, Estrada Peacosta Tabes syndrome          SOCIAL HISTORY       Social History     Socioeconomic History    Marital status:      Spouse name: Mr. 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 Dougie Alvarez, Her , [phone]    AMBULATES: uses a wheel chair when out, walks at home independently    DOMICILED: has stairs in her home to the [facility] and [facility] which she does not use     Social Determinants of Health     Financial Resource Strain: Low Risk     Difficulty of Paying Living Expenses: Not hard at all   Food Insecurity: No Food Insecurity    Worried About [facility] in the Last Year: Never true    [facility] in the Last Year: Never true       SCREENINGS    Sherrie Coma Scale  Eye Opening: Spontaneous  Best Verbal Response: Oriented  Best Motor Response: Obeys commands  [place] Coma Scale Score: 15        PHYSICAL EXAM    (up to 7 for level 4, 8 or more for level 5)     ED Triage Vitals [02/20/23 0726]   BP Temp Temp src Heart Rate Resp SpO2 Height Weight   (!) 201/64 97.9 °F (36.6 °C) -- 98 23 100 % 5' 5\" (1.651 m) (!) 315 lb 4.1 oz (143 kg)       Physical Exam  Vitals and nursing note reviewed.   Constitutional:       Appearance: She is well-developed.   HENT:      Head: Normocephalic and atraumatic.   Eyes:      Conjunctiva/sclera: Conjunctivae normal.      Pupils: Pupils are equal, round, and reactive to light.   Neck:      Trachea: No tracheal deviation.   Cardiovascular:      Rate and Rhythm: Normal rate and regular rhythm.      Heart sounds: Normal heart sounds.   Pulmonary:      Effort: Pulmonary effort is normal. No respiratory distress.      Breath sounds: Normal breath sounds. No wheezing or rales.   Abdominal:      General: Bowel sounds are normal.      Palpations: Abdomen is soft.      Tenderness: There is abdominal tenderness. There is right CVA tenderness.   Musculoskeletal:         General: Normal range of motion.      Cervical back: Normal range of motion and neck supple.   Skin:     General: Skin is warm and dry.   Neurological:      Mental Status: She is alert and oriented to person, place, and time.   Psychiatric:         Behavior: Behavior normal.         Thought Content: Thought content normal.       DIAGNOSTIC RESULTS     EKG: All EKG's areinterpreted by the Emergency Department Physician who either signs or Co-signs this chart in the absence of a cardiologist.    None see below    RADIOLOGY:  Non-plain film images such as CT, Ultrasound and MRI are read by the radiologist. Plain radiographic images are visualized and preliminarily interpreted bythe emergency physician with the below findings:    See below      CT ABDOMEN PELVIS WO CONTRAST Additional Contrast? None   Final Result   Impression:    1.No acute abnormality identified within the abdomen or pelvis.    2.No urinary tract calculi are identified.    3.Additional findings as detailed above.              LABS:  Labs Reviewed   CBC WITH AUTO DIFFERENTIAL - Abnormal;  Notable for the following components:       Result Value    WBC 15.5 (*)     RBC 2.48 (*)     Hemoglobin 8.5 (*)     Hematocrit 25.8 (*)     .0 (*)     MCH 34.3 (*)     MCHC 32.9 (*)     RDW 15.9 (*)     Neutrophils % 92.2 (*)     Lymphocytes % 5.0 (*)     Neutrophils Absolute 14.3 (*)     Lymphocytes Absolute 0.8 (*)     All other components within normal limits   COMPREHENSIVE METABOLIC PANEL - Abnormal; Notable for the following components:    Chloride 90 (*)     CO2 21 (*)     Anion Gap 25 (*)     Glucose 256 (*)     BUN 67 (*)     Creatinine 10.3 (*)     Est, Glom Filt Rate 4 (*)     Calcium 10.3 (*)     All other components within normal limits   LIPASE       All other labs were within normal range or not returned as of this dictation. EMERGENCY DEPARTMENT COURSE and DIFFERENTIAL DIAGNOSIS/MDM:   Vitals:    Vitals:    02/20/23 0726   BP: (!) 201/64   Pulse: 98   Resp: 23   Temp: 97.9 °F (36.6 °C)   SpO2: 100%   Weight: (!) 315 lb 4.1 oz (143 kg)   Height: 5' 5\" (1.651 m)       MDM     Amount and/or Complexity of Data Reviewed  Clinical lab tests: ordered and reviewed  Tests in the radiology section of CPT®: ordered and reviewed    Risk of Complications, Morbidity, and/or Mortality  Presenting problems: moderate  Diagnostic procedures: moderate  Management options: moderate    Patient Progress  Patient progress: improved      Reassessment  Patient's pain is improved. She is resting comfortably. Labs are close to baseline. Imaging does not show a stone. Patient is stable for discharge. She was advised to follow-up with her primary her blood pressure is much improved. Return for any concerns. CONSULTS:  None    PROCEDURES:  Unless otherwise noted below, none     Procedures    FINAL IMPRESSION      1.  Abdominal pain, unspecified abdominal location          DISPOSITION/PLAN   DISPOSITION Decision To Discharge 02/20/2023 09:36:32 AM      PATIENT REFERRED TO:  Elisabeth Tillman MD  [address] [facility]  [phone]    In 2 days      DISCHARGE MEDICATIONS:  New Prescriptions    No medications on file          (Please note that portions of this note were completed with a voice recognition program.  Efforts were made to edit thedictations but occasionally words are mis-transcribed.)    Mary Ronquillo MD (electronically signed)  Attending Emergency Physician        Mary Ronquillo MD  02/20/23 9160

## 2023-02-21 ENCOUNTER — TELEPHONE (OUTPATIENT)
Dept: ENDOCRINOLOGY | Facility: CLINIC | Age: 63
End: 2023-02-21
Payer: MEDICARE

## 2023-02-27 DIAGNOSIS — F41.8 MIXED ANXIETY AND DEPRESSIVE DISORDER: ICD-10-CM

## 2023-02-27 RX ORDER — CITALOPRAM 40 MG/1
40 TABLET ORAL DAILY
Qty: 30 TABLET | Refills: 0 | Status: SHIPPED | OUTPATIENT
Start: 2023-02-27

## 2023-03-01 ASSESSMENT — ENCOUNTER SYMPTOMS
ABDOMINAL PAIN: 0
NAUSEA: 0
BLOOD IN STOOL: 0
SORE THROAT: 0
CHEST TIGHTNESS: 0
RHINORRHEA: 0
VOMITING: 0
BACK PAIN: 0
DIARRHEA: 0
ABDOMINAL DISTENTION: 0
TROUBLE SWALLOWING: 0
APNEA: 0
SINUS PAIN: 0
COUGH: 0
SHORTNESS OF BREATH: 1
CONSTIPATION: 0
WHEEZING: 0

## 2023-03-01 NOTE — PROGRESS NOTES
Sara Pardo ( 1960) is a 62 y.o. female, established here for evaluation of the following chief complaint(s).  Medicare AWV      Patient was encouraged and advised to be compliant with all  medications leads an active lifestyle and promote maintaining a healthy weight, encouraged not to use cigarettes, laboratory results discussed and reviewed with patient's during this visit   ASSESSMENT/PLAN:  Problem List          Circulatory    Hypertension      Well-controlled, continue current medications            Respiratory    GERI on CPAP      Patient does not feel very awake in the morning her CPAP mask leaks her last titration study was over 10 years ago we have decided to repeat to test her and retitrate her         Relevant Orders    Sleep Study with PAP Titration       Endocrine    Type 2 diabetes mellitus with ESRD (end-stage renal disease) (HCC) - Primary      Well-controlled, continue current medications         Relevant Medications    Insulin Regular Human (HUMULIN R U-500 KWIKPEN SC)    Semaglutide,0.25 or 0.5MG/DOS, (OZEMPIC, 0.25 OR 0.5 MG/DOSE,) 2 MG/1.5ML SOPN    Other Relevant Orders     DIABETES FOOT EXAM (Completed)    [facility] Referral to [facility] Clinical Specialist       Other    Moderate major depression ([facility])      Referred to psychiatry         Relevant Medications    citalopram (CELEXA) 40 MG tablet    diazePAM (VALIUM) 2 MG tablet    lamoTRIgine (LAMICTAL) 100 MG tablet    buPROPion (WELLBUTRIN SR) 150 MG extended release tablet    busPIRone (BUSPAR) 10 MG tablet    Other Relevant Orders    Alejandra Cuevas MD, Psychiatry, [place]    Extreme obesity      Uncontrolled, continue current medications and lifestyle modifications recommended         Relevant Medications    Insulin Regular Human (HUMULIN R U-500 KWIKPEN SC)    Semaglutide,0.25 or 0.5MG/DOS, (OZEMPIC, 0.25 OR 0.5 MG/DOSE,) 2 MG/1.5ML SOPN    Anxiety      Continues to be a problem it is on BuSpar however she is on high-dose  duloxetine and Wellbutrin fear that her restlessness and inability to rest may be a form of serotonergic syndrome due to the failure to response to multiple regimens patient was referred to psychiatry for further evaluation and medication management         Relevant Medications    citalopram (CELEXA) 40 MG tablet    diazePAM (VALIUM) 2 MG tablet    buPROPion (WELLBUTRIN SR) 150 MG extended release tablet    busPIRone (BUSPAR) 10 MG tablet       SPENSER 7 SCORE 12/2/2022   SPENSER-7 Total Score 9       PHQ Scores 3/2/2023 1/10/2023 12/2/2022   PHQ2 Score 2 2 4   PHQ9 Score 11 10 13       Results for orders placed or performed during the hospital encounter of 02/20/23   CBC with Auto Differential   Result Value Ref Range    WBC 15.5 (H) 4.8 - 10.8 K/uL    RBC 2.48 (L) 4.20 - 5.40 M/uL    Hemoglobin 8.5 (L) 12.0 - 16.0 g/dL    Hematocrit 25.8 (L) 37.0 - 47.0 %    .0 (H) 81.0 - 99.0 fL    MCH 34.3 (H) 27.0 - 31.0 pg    MCHC 32.9 (L) 33.0 - 37.0 g/dL    RDW 15.9 (H) 11.5 - 14.5 %    Platelets 831 031 - 383 K/uL    MPV 9.4 9.4 - 12.3 fL    Neutrophils % 92.2 (H) 50.0 - 65.0 %    Lymphocytes % 5.0 (L) 20.0 - 40.0 %    Monocytes % 1.7 0.0 - 10.0 %    Eosinophils % 0.1 0.0 - 5.0 %    Basophils % 0.2 0.0 - 1.0 %    Neutrophils Absolute 14.3 (H) 1.5 - 7.5 K/uL    Immature Granulocytes # 0.1 K/uL    Lymphocytes Absolute 0.8 (L) 1.1 - 4.5 K/uL    Monocytes Absolute 0.30 0.00 - 0.90 K/uL    Eosinophils Absolute 0.00 0.00 - 0.60 K/uL    Basophils Absolute 0.00 0.00 - 0.20 K/uL   CMP   Result Value Ref Range    Sodium 136 136 - 145 mmol/L    Potassium 4.0 3.5 - 5.0 mmol/L    Chloride 90 (L) 98 - 111 mmol/L    CO2 21 (L) 22 - 29 mmol/L    Anion Gap 25 (H) 7 - 19 mmol/L    Glucose 256 (H) 74 - 109 mg/dL    BUN 67 (H) 8 - 23 mg/dL    Creatinine 10.3 (H) 0.5 - 0.9 mg/dL    Est, Glom Filt Rate 4 (A) >60    Calcium 10.3 (H) 8.8 - 10.2 mg/dL    Total Protein 7.6 6.6 - 8.7 g/dL    Albumin 3.9 3.5 - 5.2 g/dL    Total Bilirubin 0.3 0.2 - 1.2 mg/dL    Alkaline Phosphatase 69 35 - 104 U/L    ALT 7 5 - 33 U/L    AST 10 5 - 32 U/L   Lipase   Result Value Ref Range    Lipase 24 13 - 60 U/L       Return in about 3 months (around 6/2/2023). HPI  58 year olf female FUV  Hx of Type 2 DM/ ESRD  GERI on CPAP  Depression  HL  Extreme Obesity  Anemia due to CKD      Review of Systems   Constitutional:  Negative for activity change, chills, fatigue and fever. HENT:  Negative for congestion, ear pain, hearing loss, mouth sores, nosebleeds, postnasal drip, rhinorrhea, sinus pain, sore throat and trouble swallowing. Eyes:  Negative for visual disturbance. Respiratory:  Positive for shortness of breath. Negative for apnea, cough, chest tightness and wheezing. Cardiovascular:  Positive for leg swelling. Negative for chest pain and palpitations. Gastrointestinal:  Negative for abdominal distention, abdominal pain, blood in stool, constipation, diarrhea, nausea and vomiting. Endocrine: Negative for cold intolerance, heat intolerance and polyuria. Genitourinary:  Negative for difficulty urinating, dysuria, flank pain, frequency and urgency. Musculoskeletal:  Negative for arthralgias, back pain and myalgias. Skin:  Negative for rash and wound. Allergic/Immunologic: Negative for environmental allergies and food allergies. Neurological:  Negative for dizziness, tremors, seizures, syncope, speech difficulty, weakness, light-headedness, numbness and headaches. Hematological:  Negative for adenopathy. Does not bruise/bleed easily. Psychiatric/Behavioral:  Positive for dysphoric mood and sleep disturbance. Negative for confusion, decreased concentration and suicidal ideas. The patient is nervous/anxious. The patient is not hyperactive. Physical Exam  Vitals and nursing note reviewed. Constitutional:       General: She is not in acute distress. Appearance: Normal appearance. She is obese. HENT:      Head: Normocephalic.       Right Ear: External ear normal.      Left Ear: External ear normal.      Nose: Nose normal. No congestion or rhinorrhea. Mouth/Throat:      Mouth: Mucous membranes are moist.      Pharynx: No oropharyngeal exudate or posterior oropharyngeal erythema. Eyes:      General: No scleral icterus. Right eye: No discharge. Left eye: No discharge. Extraocular Movements: Extraocular movements intact. Conjunctiva/sclera: Conjunctivae normal.      Pupils: Pupils are equal, round, and reactive to light. Neck:      Thyroid: No thyromegaly. Vascular: No carotid bruit or JVD. Cardiovascular:      Rate and Rhythm: Normal rate and regular rhythm. Chest Wall: PMI is not displaced. Pulses: Normal pulses. Heart sounds: Normal heart sounds, S1 normal and S2 normal. No murmur heard. Pulmonary:      Effort: Pulmonary effort is normal. No respiratory distress. Breath sounds: Normal breath sounds and air entry. No decreased air movement or transmitted upper airway sounds. No decreased breath sounds, wheezing or rales. Abdominal:      General: Abdomen is flat. Bowel sounds are normal. There is no distension. Palpations: Abdomen is soft. There is no shifting dullness, hepatomegaly, splenomegaly or mass. Tenderness: There is no abdominal tenderness. There is no right CVA tenderness, left CVA tenderness, guarding or rebound. Hernia: No hernia is present. Musculoskeletal:         General: No deformity. Cervical back: Normal range of motion and neck supple. No rigidity. Right lower leg: No edema. Left lower leg: No edema. Lymphadenopathy:      Cervical: No cervical adenopathy. Skin:     General: Skin is warm and moist.      Findings: No lesion or rash. Neurological:      Mental Status: She is alert. Mental status is at baseline. Cranial Nerves: No cranial nerve deficit. Motor: Motor function is intact. Coordination: Coordination is intact. Gait: Gait abnormal.      Deep Tendon Reflexes: Reflexes normal.   Psychiatric:         Attention and Perception: Attention normal.         Speech: Speech normal.         Behavior: Behavior normal.         Thought Content: Thought content normal.         Judgment: Judgment normal.         (Time Documentation Optional [id number])    An electronic signaturewaas used to authenticate this note  -Lenny Green MD on 3/2/2023 at 5:44 PM    PHQ-9 score today: (PHQ-9 Total Score: 11), additional evaluation and assessment performed, follow-up plan includes but not limited to: Medication management and Referral to /Specialist  for evaluation and management. Medicare Annual Wellness Visit    Hansa Hummel is here for Medicare AWV    Assessment & Plan   Type 2 diabetes mellitus with ESRD (end-stage renal disease) ([facility] [address]  Assessment & Plan:   Well-controlled, continue current medications  Orders:  -      DIABETES FOOT EXAM  -     [facility] Referral to [facility] Clinical Specialist  Breast cancer screening by mammogram  -     [facility] DIGITAL SCREEN W OR WO CAD BILATERAL; Future  -     [facility] Referral to [facility] Clinical Specialist  GERI on CPAP  Assessment & Plan:   Patient does not feel very awake in the morning her CPAP mask leaks her last titration study was over 10 years ago we have decided to repeat to test her and retitrate her  Orders:  -     Sleep Study with PAP Titration;  Future  Moderate major depression (HCC)  Assessment & Plan:   Referred to psychiatry  Orders:  -     Shane Meredith MD, Psychiatry, [place]  Leukocytosis, unspecified type  -     Iris Carlos CNP, Hematology/Oncology, Sridevi Mcdonald MD, Pulmonary Disease, [facility] [place] [facility]  Primary hypertension  Assessment & Plan:   Well-controlled, continue current medications  Anxiety  Assessment & Plan:   Continues to be a problem it is on BuSpar however she is on high-dose duloxetine and Wellbutrin fear that her restlessness and inability to rest may be a form of serotonergic syndrome due to the failure to response to multiple regimens patient was referred to psychiatry for further evaluation and medication management  Extreme obesity  Assessment & Plan:   Uncontrolled, continue current medications and lifestyle modifications recommended    Recommendations for Preventive Services Due: see orders and patient instructions/AVS.  Recommended screening schedule for the next 5-10 years is provided to the patient in written form: see Patient Instructions/AVS.     Return in about 3 months (around 6/2/2023). Subjective       Patient's complete Health Risk Assessment and screening values have been reviewed and are found in Flowsheets. The following problems were reviewed today and where indicated follow up appointments were made and/or referrals ordered. Positive Risk Factor Screenings with Interventions:    Fall Risk:  Do you feel unsteady or are you worried about falling? : (!) yes  2 or more falls in past year?: (!) yes  Fall with injury in past year?: no     Interventions:    Patient advised to follow-up in this office for further evaluation and treatment     Depression:  PHQ-2 Score: 2  PHQ-9 Total Score: 11    Interpretation:   1-4 = minimal  5-9 = mild  10-14 = moderate  15-19 = moderately severe  20-27 = severe    Interventions:  Referred to Psychiatry         Self-assessment of health:   In general, how would you say your health is?: (!) Poor    Interventions:  Patient advised to follow-up in this office for further evaluation and treatment    General HRA Questions:  Select all that apply: (!) Loneliness, New or Increased Pain, Stress, Anger    Pain Interventions:  Referred to: Psychiatry    Loneliness Interventions:  Patient advised to follow up in the office for further evaluation and treatment    Stress Interventions:  Patient advised to follow up in the office for further evaluation and treatment    Anger Interventions:  Patient advised to follow up in the office for further evaluation and treatment      Social and Emotional Support:  Do you get the social and emotional support that you need?: (!) No  Interventions:  Patient advised to follow up in the office for further evaluation and treatment    Weight and Activity:  Physical Activity: Inactive    Days of Exercise per Week: 0 days    Minutes of Exercise per Session: 0 min     On average, how many days per week do you engage in moderate to strenuous exercise (like a brisk walk)?: 0 days  Have you lost any weight without trying in the past 3 months?: No  Body mass index: (!) 52.25      Inactivity Interventions:  Patient advised to follow up in the office for further evaluation and treatment  Obesity Interventions:  Patient advised to follow-up in this office for further evaluation and treatment           Hearing Screen:  Do you or your family notice any trouble with your hearing that hasn't been managed with hearing aids?: (!) Yes    Interventions:  Patient declines any further evaluation or treatment     Safety:  Do you have any tripping hazards - loose or unsecured carpets or rugs?: (!) Yes  Do you have any tripping hazards - clutter in doorways, halls, or stairs?: (!) Yes    Interventions:  Patient advised to follow up in the office for further evaluation and treatment    ADL's:   Patient reports needing help with:  Select all that apply: (!) Walking/Balance  Select all that apply: [employer], Laundry, Housekeeping    Interventions:  Patient declined any further interventions or treatment    Advanced Directives:  Do you have a Living Will?: (!) No    Intervention:  has NO advanced directive - information provided                       Objective   Vitals:    03/02/23 1012   BP: 126/66   Pulse: 73   Resp: 20   Temp: 97.2 °F (36.2 °C)   TempSrc: Temporal   SpO2: 99%   Weight: (!) 314 lb (142.4 kg)   Height: 5' 5\" (1.651 m)      Body mass index is 52.25 kg/m². Allergies   Allergen Reactions    Codeine Nausea And Vomiting and Other (See Comments)     \"Hot and sweaty\"     Prior to Visit Medications    Medication Sig Taking?  Authorizing Provider   lamoTRIgine (LAMICTAL) 100 MG tablet Take 1 tablet by mouth nightly Yes Deepali Gilliland MD   buPROPion (WELLBUTRIN SR) 150 MG extended release tablet Take 1 tablet by mouth daily Yes Deepali Gilliland MD   busPIRone (BUSPAR) 10 MG tablet Take 1 tablet by mouth 2 times daily Yes Deepali Gilliland MD   docusate sodium (COLACE) 100 MG capsule Take 1 capsule by mouth 2 times daily Yes Nicanor Sheldon MD   Semaglutide,0.25 or 0.5MG/DOS, (OZEMPIC, 0.25 OR 0.5 MG/DOSE,) 2 MG/1.5ML SOPN Inject 2 mg into the skin once a week Yes Historical Provider, MD   Sucroferric Oxyhydroxide (VELPHORO) 500 MG CHEW Take 2 tablets by mouth 3 times daily (before meals) Yes Historical Provider, MD   ondansetron (ZOFRAN ODT) 4 MG disintegrating tablet Take 1 tablet by mouth every 8 hours as needed for Nausea or Vomiting Yes Kiki Harrison MD   Insulin Regular Human (HUMULIN R U-500 KWIKPEN SC) Inject into the skin OMNI POD 1 UNIT PER HOUR AND BOLUS 25 UNITS Q AM, 15 UNITS AT HS Yes Historical Provider, MD   potassium chloride (KLOR-CON) 20 MEQ packet Take 20 mEq by mouth daily Yes Historical Provider, MD   vitamin C (ASCORBIC ACID) 500 MG tablet Take 500 mg by mouth 2 times daily Yes Historical Provider, MD   carvedilol (COREG) 6.25 MG tablet Take 6.25 mg by mouth 2 times daily (with meals) Yes Historical Provider, MD   calcitRIOL (ROCALTROL) 0.5 MCG capsule Take 0.5 mcg by mouth daily Yes Historical Provider, MD   Evolocumab (REPATHA SC) Inject into the skin every 14 days Yes Historical Provider, MD   levothyroxine (SYNTHROID) 88 MCG tablet Take 88 mcg by mouth Daily  Yes Historical Provider, MD   rosuvastatin (CRESTOR) 20 MG tablet Take 20 mg by mouth daily  Yes Historical Provider, MD   cetirizine (ZYRTEC) 10 MG tablet Take 10 mg by mouth as needed for Allergies  Yes Historical Provider, MD   diazePAM (VALIUM) 2 MG tablet Take 2 mg by mouth as needed for Anxiety.   Yes Historical Provider, MD   fluticasone (FLONASE) 50 MCG/ACT nasal spray 2 sprays by Nasal route daily as needed for Rhinitis  Yes Historical Provider, MD   epoetin jose ramon (EPOGEN;PROCRIT) 56758 UNIT/ML injection Inject 10,000 Units into the skin every 14 days Yes Historical Provider, MD   allopurinol (ZYLOPRIM) 100 MG tablet Take 100 mg by mouth 2 times daily Yes Historical Provider, MD   citalopram (CELEXA) 40 MG tablet Take 40 mg by mouth daily Yes Historical Provider, MD   aspirin 81 MG tablet Take 81 mg by mouth daily With Dinner Yes Historical Provider, MD [place] (Including outside providers/suppliers regularly involved in providing care):   Patient Care Team:  Jose Leigh MD as PCP - General (Internal Medicine)  Jose Leigh MD as PCP - Empaneled Provider     Reviewed and updated this visit:  Kyle Gilliland MD

## 2023-03-02 ENCOUNTER — OFFICE VISIT (OUTPATIENT)
Dept: PRIMARY CARE CLINIC | Age: 63
End: 2023-03-02

## 2023-03-02 ENCOUNTER — TELEPHONE (OUTPATIENT)
Dept: PSYCHIATRY | Age: 63
End: 2023-03-02

## 2023-03-02 VITALS
HEART RATE: 73 BPM | HEIGHT: 65 IN | SYSTOLIC BLOOD PRESSURE: 126 MMHG | TEMPERATURE: 97.2 F | OXYGEN SATURATION: 99 % | RESPIRATION RATE: 20 BRPM | DIASTOLIC BLOOD PRESSURE: 66 MMHG | BODY MASS INDEX: 48.82 KG/M2 | WEIGHT: 293 LBS

## 2023-03-02 DIAGNOSIS — I10 PRIMARY HYPERTENSION: ICD-10-CM

## 2023-03-02 DIAGNOSIS — N18.6 TYPE 2 DIABETES MELLITUS WITH ESRD (END-STAGE RENAL DISEASE) (HCC): Primary | ICD-10-CM

## 2023-03-02 DIAGNOSIS — D72.829 LEUKOCYTOSIS, UNSPECIFIED TYPE: ICD-10-CM

## 2023-03-02 DIAGNOSIS — R06.81 APNEA: ICD-10-CM

## 2023-03-02 DIAGNOSIS — Z00.00 INITIAL MEDICARE ANNUAL WELLNESS VISIT: ICD-10-CM

## 2023-03-02 DIAGNOSIS — F41.9 ANXIETY: ICD-10-CM

## 2023-03-02 DIAGNOSIS — E11.22 TYPE 2 DIABETES MELLITUS WITH ESRD (END-STAGE RENAL DISEASE) (HCC): Primary | ICD-10-CM

## 2023-03-02 DIAGNOSIS — F32.1 MODERATE MAJOR DEPRESSION (HCC): ICD-10-CM

## 2023-03-02 DIAGNOSIS — G47.33 OSA ON CPAP: ICD-10-CM

## 2023-03-02 DIAGNOSIS — E66.8 EXTREME OBESITY: ICD-10-CM

## 2023-03-02 DIAGNOSIS — Z99.89 OSA ON CPAP: ICD-10-CM

## 2023-03-02 DIAGNOSIS — Z12.31 BREAST CANCER SCREENING BY MAMMOGRAM: ICD-10-CM

## 2023-03-02 SDOH — ECONOMIC STABILITY: FOOD INSECURITY: WITHIN THE PAST 12 MONTHS, THE FOOD YOU BOUGHT JUST DIDN'T LAST AND YOU DIDN'T HAVE MONEY TO GET MORE.: SOMETIMES TRUE

## 2023-03-02 SDOH — ECONOMIC STABILITY: FOOD INSECURITY: WITHIN THE PAST 12 MONTHS, YOU WORRIED THAT YOUR FOOD WOULD RUN OUT BEFORE YOU GOT MONEY TO BUY MORE.: SOMETIMES TRUE

## 2023-03-02 SDOH — ECONOMIC STABILITY: INCOME INSECURITY: HOW HARD IS IT FOR YOU TO PAY FOR THE VERY BASICS LIKE FOOD, HOUSING, MEDICAL CARE, AND HEATING?: HARD

## 2023-03-02 SDOH — ECONOMIC STABILITY: HOUSING INSECURITY
IN THE LAST 12 MONTHS, WAS THERE A TIME WHEN YOU DID NOT HAVE A STEADY PLACE TO SLEEP OR SLEPT IN A SHELTER (INCLUDING NOW)?: NO

## 2023-03-02 ASSESSMENT — PATIENT HEALTH QUESTIONNAIRE - PHQ9
2. FEELING DOWN, DEPRESSED OR HOPELESS: 1
8. MOVING OR SPEAKING SO SLOWLY THAT OTHER PEOPLE COULD HAVE NOTICED. OR THE OPPOSITE, BEING SO FIGETY OR RESTLESS THAT YOU HAVE BEEN MOVING AROUND A LOT MORE THAN USUAL: 0
6. FEELING BAD ABOUT YOURSELF - OR THAT YOU ARE A FAILURE OR HAVE LET YOURSELF OR YOUR FAMILY DOWN: 1
10. IF YOU CHECKED OFF ANY PROBLEMS, HOW DIFFICULT HAVE THESE PROBLEMS MADE IT FOR YOU TO DO YOUR WORK, TAKE CARE OF THINGS AT HOME, OR GET ALONG WITH OTHER PEOPLE: 1
5. POOR APPETITE OR OVEREATING: 1
SUM OF ALL RESPONSES TO PHQ QUESTIONS 1-9: 11
3. TROUBLE FALLING OR STAYING ASLEEP: 3
9. THOUGHTS THAT YOU WOULD BE BETTER OFF DEAD, OR OF HURTING YOURSELF: 0
4. FEELING TIRED OR HAVING LITTLE ENERGY: 3
1. LITTLE INTEREST OR PLEASURE IN DOING THINGS: 1
7. TROUBLE CONCENTRATING ON THINGS, SUCH AS READING THE NEWSPAPER OR WATCHING TELEVISION: 1
SUM OF ALL RESPONSES TO PHQ9 QUESTIONS 1 & 2: 2

## 2023-03-02 ASSESSMENT — LIFESTYLE VARIABLES
HOW OFTEN DO YOU HAVE A DRINK CONTAINING ALCOHOL: NEVER
HOW MANY STANDARD DRINKS CONTAINING ALCOHOL DO YOU HAVE ON A TYPICAL DAY: PATIENT DOES NOT DRINK

## 2023-03-02 NOTE — ASSESSMENT & PLAN NOTE
Patient does not feel very awake in the morning her CPAP mask leaks her last titration study was over 10 years ago we have decided to repeat to test her and retitrate her

## 2023-03-02 NOTE — TELEPHONE ENCOUNTER
Called pt to schedule an appt from a referral    Scheduled with Dr. Lydia Harris on 03/13/23 @ 12.     Electronically signed by Jurgen Mckay MA on 3/2/2023 at 3:24 PM

## 2023-03-02 NOTE — ASSESSMENT & PLAN NOTE
Continues to be a problem it is on BuSpar however she is on high-dose duloxetine and Wellbutrin fear that her restlessness and inability to rest may be a form of serotonergic syndrome due to the failure to response to multiple regimens patient was referred to psychiatry for further evaluation and medication management

## 2023-03-03 DIAGNOSIS — F41.9 ANXIETY: ICD-10-CM

## 2023-03-03 RX ORDER — DIAZEPAM 5 MG/1
TABLET ORAL
Qty: 30 TABLET | Refills: 1 | OUTPATIENT
Start: 2023-03-03

## 2023-03-03 NOTE — TELEPHONE ENCOUNTER
I did refer patient to internal medicine due to increased chronic conditions for them to take over her care. Please see if she is seeing them now, I do not see this. If so they will need to take this over. If she isnt we need to get her setup with them and can refill this for now. Patient uses this for restless leg due to her dialysis.

## 2023-03-03 NOTE — PATIENT INSTRUCTIONS
Preventing Falls: Care Instructions  Overview     Getting around your home safely can be a challenge if you have injuries or health problems that make it easy for you to fall. Loose rugs and furniture in walkways are among the dangers for many older people who have problems walking or who have poor eyesight. People who have conditions such as arthritis, osteoporosis, or dementia also have to be careful not to fall. You can make your home safer with a few simple measures. Follow-up care is a key part of your treatment and safety. Be sure to make and go to all appointments, and call your doctor if you are having problems. It's also a good idea to know your test results and keep a list of the medicines you take. How can you care for yourself at home? Taking care of yourself  Exercise regularly to improve your strength, muscle tone, and balance. Walk if you can. Swimming may be a good choice if you cannot walk easily. Have your vision and hearing checked each year or any time you notice a change. If you have trouble seeing and hearing, you might not be able to avoid objects and could lose your balance. Know the side effects of the medicines you take. Ask your doctor or pharmacist whether the medicines you take can affect your balance. Sleeping pills or sedatives can affect your balance. Limit the amount of alcohol you drink. Alcohol can impair your balance and other senses. Ask your doctor whether calluses or corns on your feet need to be removed. If you wear loose-fitting shoes because of calluses or corns, you can lose your balance and fall. Talk to your doctor if you have numbness in your feet. You may get dizzy if you do not drink enough water. To prevent dehydration, drink plenty of fluids. Choose water and other clear liquids. If you have kidney, heart, or liver disease and have to limit fluids, talk with your doctor before you increase the amount of fluids you drink.   Preventing falls at home  Remove raised doorway thresholds, throw rugs, and clutter. Repair loose carpet or raised areas in the floor. Move furniture and electrical cords to keep them out of walking paths. Use nonskid floor wax, and wipe up spills right away, especially on ceramic tile floors. If you use a walker or cane, put rubber tips on it. If you use crutches, clean the bottoms of them regularly with an abrasive pad, such as steel wool. Keep your house well lit, especially stairways, porches, and outside walkways. Use night-lights in areas such as hallways and bathrooms. Add extra light switches or use remote switches (such as switches that go on or off when you clap your hands) to make it easier to turn lights on if you have to get up during the night. Install sturdy handrails on stairways. Move items in your cabinets so that the things you use a lot are on the lower shelves (about waist level). Keep a cordless phone and a flashlight with new batteries by your bed. If possible, put a phone in each of the main rooms of your house, or carry a cell phone in case you fall and cannot reach a phone. Or, you can wear a device around your neck or wrist. You push a button that sends a signal for help. Wear low-heeled shoes that fit well and give your feet good support. Use footwear with nonskid soles. Check the heels and soles of your shoes for wear. Repair or replace worn heels or soles. Do not wear socks without shoes on smooth floors, such as wood. Walk on the grass when the sidewalks are slippery. If you live in an area that gets snow and ice in the winter, sprinkle salt on slippery steps and sidewalks. Or ask a family member or friend to do this for you. Preventing falls in the bath  Install grab bars and nonskid mats inside and outside your shower or tub and near the toilet and sinks. Use shower chairs and bath benches. Use a hand-held shower head that will allow you to sit while showering.   Get into a tub or shower by putting the weaker leg in first. Get out of a tub or shower with your strong side first.  Repair loose toilet seats and consider installing a raised toilet seat to make getting on and off the toilet easier.  Keep your bathroom door unlocked while you are in the shower.  Where can you learn more?  Go to https://www.[employer].net/patientEd and enter G117 to learn more about \"Preventing Falls: Care Instructions.\"  Current as of: May 4, 2022               Content Version: 13.5  © [id number] [employer].   Care instructions adapted under license by [employer]. If you have questions about a medical condition or this instruction, always ask your healthcare professional. [employer] disclaims any warranty or liability for your use of this information.           Learning About Mindfulness for Stress  What are mindfulness and stress?     Stress is what you feel when you have to handle more than you are used to. A lot of things can cause stress. You may feel stress when you go on a job interview, take a test, or run a race. This kind of short-term stress is normal and even useful. It can help you if you need to work hard or react quickly.  Stress also can last a long time. Long-term stress is caused by stressful situations or events. Examples of long-term stress include long-term health problems, ongoing problems at work, and conflicts in your family. Long-term stress can harm your health.  Mindfulness is a focus only on things happening in the present moment. It's a process of purposefully paying attention to and being aware of your surroundings, your emotions, your thoughts, and how your body feels. You are aware of these things, but you aren't judging these experiences as \"good\" or \"bad.\" Mindfulness can help you learn to calm your mind and body to help you cope with illness, pain, and stress.  How does mindfulness help to relieve stress?  Mindfulness can help quiet your mind and relax your body.  Studies show that it can help some people sleep better, feel less anxious, and bring their blood pressure down. And it's been shown to help some people live and cope better with certain health problems like heart disease, depression, chronic pain, and cancer. How do you practice mindfulness? To be mindful is to pay attention, to be present, and to be accepting. When you're mindful, you do just one thing and you pay close attention to that one thing. For example, you may sit quietly and notice your emotions or how your food tastes and smells. When you're present, you focus on the things that are happening right now. You let go of your thoughts about the past and the future. When you dwell on the past or the future, you miss moments that can heal and strengthen you. You may miss moments like hearing a child laugh or seeing a friendly face when you think you're all alone. When you're accepting, you don't  the present moment. Instead you accept your thoughts and feelings as they come. You can practice anytime, anywhere, and in any way you choose. You can practice in many ways. Here are a few ideas:  While doing your chores, like washing the dishes, let your mind focus on what's in your hand. What does the dish feel like? Is the water warm or cold? Go outside and take a few deep breaths. What is the air like? Is it warm or cold? When you can, take some time at the start of your day to sit alone and think. Take a slow walk by yourself. Count your steps while you breathe in and out. Try yoga breathing exercises, stretches, and poses to strengthen and relax your muscles. At work, if you can, try to stop for a few moments each hour. Note how your body feels. Let yourself regroup and let your mind settle before you return to what you were doing. If you struggle with anxiety or \"worry thoughts,\" imagine your mind as a blue jose martin and your worry thoughts as clouds.  Now imagine those worry thoughts floating across your mind's jose martin. Just let them pass by as you watch. Follow-up care is a key part of your treatment and safety. Be sure to make and go to all appointments, and call your doctor if you are having problems. It's also a good idea to know your test results and keep a list of the medicines you take. Where can you learn more? Go to http://www.smith.com/ and enter M676 to learn more about \"Learning About Mindfulness for Stress. \"  Current as of: February 9, 2022               Content Version: 13.5  © 8441-0211 [employer]. Care instructions adapted under license by [facility]. If you have questions about a medical condition or this instruction, always ask your healthcare professional. [address] any warranty or liability for your use of this information. Learning About Emotional Support  When do you need emotional support? You might find getting support from others helpful when you have a long-term health problem. Often people feel alone, confused, or scared when coping with an illness. But you aren't alone. Other people are going through the same thing you are and know how you feel. Talking with others about your feelings can help you feel better. Your family and friends can give you support. So can your doctor, a support group, or a Rastafarian. If you have a support network, you will not feel as alone. You will learn new ways to deal with your situation, and you may try harder to overcome it. Where you can get support  Family and friends: They can help you cope by giving you comfort and encouragement. Counseling: Professional counseling can help you cope with situations that interfere with your life and cause stress. Counseling can help you understand and deal with your illness. Your doctor: Find a doctor you trust and feel comfortable with. Be open and honest about your fears and concerns.  Your doctor can help you get the right medical treatments, including counseling. Spiritual or Hinduism groups: They can provide comfort and may be able to help you find counseling or other social support services. Social groups: They can help you meet new people and get involved in activities you enjoy. Community support groups: In a support group, you can talk to others who have dealt with the same problems or illness as you. You can encourage one another and learn ways to cope with tough emotions. How to find a support group  Ask your doctor, counselor, or other health professional for suggestions. Contact your local Yarsani, Muslim, Zoroastrianism, or other Hinduism group. Ask your family and friends. Ask people who have the same health concerns. Go online. Forums and blogs let you read messages from others and leave your own messages. You can exchange stories, vent your frustrations, and ask and answer questions. Contact a city, state, or national group that provides support for your health concerns. Your library or community center may have a list of these groups. Or you can look for information online. Look for a support group that works for you. Ask yourself if you prefer structure and would like a , or if you would like a less formal group. Do you prefer face-to-face meetings? Or do you feel more secure in online chat rooms or forums? Supportive relationships  A supportive relationship includes emotional support such as love, trust, and understanding, as well as advice and concrete help, such as help managing your time. Reach out to others  Family and friends can help you. Ask them to:  Listen to you and give you encouragement. This can keep you from feeling hopeless or alone. Help with small daily tasks or with bigger problems. A helping hand can keep you from feeling overwhelmed. Help you manage a health problem. For example, ask them to go to doctor visits with you.  Your loved ones can offer support by being involved in your medical care.  Respect your relationships  A good relationship is also a two-way street. You count on help from others, but they also count on you. Know your friends' limits. You don't have to see or call your friends every day. If you are going through a rough patch, ask friends if you can contact them outside of the usual boundaries. Don't always complain or talk about yourself. Know when it's time to stop talking and listen or just enjoy your friend's company. Know that good friends can be a bad influence. For example, if a friend encourages you to drink when you know it will harm you, you may want to end the friendship. Where can you learn more? Go to http://www.woods.com/ and enter G092 to learn more about \"Learning About Emotional Support. \"  Current as of: February 9, 2022               Content Version: 13.5  © 4072-6205 [employer]. Care instructions adapted under license by [facility] ([facility]). If you have questions about a medical condition or this instruction, always ask your healthcare professional. [address] any warranty or liability for your use of this information. Learning About Emotional Support  When do you need emotional support? You might find getting support from others helpful when you have a long-term health problem. Often people feel alone, confused, or scared when coping with an illness. But you aren't alone. Other people are going through the same thing you are and know how you feel. Talking with others about your feelings can help you feel better. Your family and friends can give you support. So can your doctor, a support group, or a Oriental orthodox. If you have a support network, you will not feel as alone. You will learn new ways to deal with your situation, and you may try harder to overcome it. Where you can get support  Family and friends: They can help you cope by giving you comfort and encouragement.   Counseling: Professional counseling can help you cope with situations that interfere with your life and cause stress. Counseling can help you understand and deal with your illness. Your doctor: Find a doctor you trust and feel comfortable with. Be open and honest about your fears and concerns. Your doctor can help you get the right medical treatments, including counseling. Spiritual or Episcopalian groups: They can provide comfort and may be able to help you find counseling or other social support services. Social groups: They can help you meet new people and get involved in activities you enjoy. Community support groups: In a support group, you can talk to others who have dealt with the same problems or illness as you. You can encourage one another and learn ways to cope with tough emotions. How to find a support group  Ask your doctor, counselor, or other health professional for suggestions. Contact your local Yarsanism, Roman Catholic, Zoroastrianism, or other Episcopalian group. Ask your family and friends. Ask people who have the same health concerns. Go online. Forums and blogs let you read messages from others and leave your own messages. You can exchange stories, vent your frustrations, and ask and answer questions. Contact a city, state, or national group that provides support for your health concerns. Your library or community center may have a list of these groups. Or you can look for information online. Look for a support group that works for you. Ask yourself if you prefer structure and would like a , or if you would like a less formal group. Do you prefer face-to-face meetings? Or do you feel more secure in online chat rooms or forums? Supportive relationships  A supportive relationship includes emotional support such as love, trust, and understanding, as well as advice and concrete help, such as help managing your time. Reach out to others  Family and friends can help you.  Ask them to:  Listen to you and give you encouragement. This can keep you from feeling hopeless or alone. Help with small daily tasks or with bigger problems. A helping hand can keep you from feeling overwhelmed. Help you manage a health problem. For example, ask them to go to doctor visits with you. Your loved ones can offer support by being involved in your medical care. Respect your relationships  A good relationship is also a two-way street. You count on help from others, but they also count on you. Know your friends' limits. You don't have to see or call your friends every day. If you are going through a rough patch, ask friends if you can contact them outside of the usual boundaries. Don't always complain or talk about yourself. Know when it's time to stop talking and listen or just enjoy your friend's company. Know that good friends can be a bad influence. For example, if a friend encourages you to drink when you know it will harm you, you may want to end the friendship. Where can you learn more? Go to http://www.woods.com/ and enter G092 to learn more about \"Learning About Emotional Support. \"  Current as of: February 9, 2022               Content Version: 13.5  © 9621-4665 Healthwise, [facility]. Care instructions adapted under license by [facility] ([facility]). If you have questions about a medical condition or this instruction, always ask your healthcare professional. [address] any warranty or liability for your use of this information. For more information on your local Area Agency on Aging or [place] on Aging please visit the appropriate web site below:    [place]: MobileCycles.pl    [facility]: https://aging. [place].gov/    [facility]: https://aging.[place].gov/    [place]: InsuranceSquad.es           Learning About Stress  What is stress? Stress is what you feel when you have to handle more than you are used to.  Stress is a fact of life for most people, and it affects everyone differently. What causes stress for you may not be stressful for someone else. A lot of things can cause stress. You may feel stress when you go on a job interview, take a test, or run a race. This kind of short-term stress is normal and even useful. It can help you if you need to work hard or react quickly. For example, stress can help you finish an important job on time. Stress also can last a long time. Long-term stress is caused by stressful situations or events. Examples of long-term stress include long-term health problems, ongoing problems at work, or conflicts in your family. Long-term stress can harm your health. How does stress affect your health? When you are stressed, your body responds as though you are in danger. It makes hormones that speed up your heart, make you breathe faster, and give you a burst of energy. This is called the fight-or-flight stress response. If the stress is over quickly, your body goes back to normal and no harm is done. But if stress happens too often or lasts too long, it can have bad effects. Long-term stress can make you more likely to get sick, and it can make symptoms of some diseases worse. If you tense up when you are stressed, you may develop neck, shoulder, or low back pain. Stress is linked to high blood pressure and heart disease. Stress also harms your emotional health. It can make you lauren, tense, or depressed. Your relationships may suffer, and you may not do well at work or school. What can you do to manage stress? How to relax your mind   Write. It may help to write about things that are bothering you. This helps you find out how much stress you feel and what is causing it. When you know this, you can find better ways to cope. Let your feelings out. Talk, laugh, cry, and express anger when you need to. Talking with friends, family, a counselor, or a member of the clergy about your feelings is a healthy way to relieve stress.   Do something you enjoy. For example, listen to music or go to a movie. Practice your hobby or do volunteer work. Meditate. This can help you relax, because you are not worrying about what happened before or what may happen in the future. Do guided imagery. Imagine yourself in any setting that helps you feel calm. You can use audiotapes, books, or a teacher to guide you. How to relax your body   Do something active. Exercise or activity can help reduce stress. Walking is a great way to get started. Even everyday activities such as housecleaning or yard work can help. Do breathing exercises. For example:  From a standing position, bend forward from the waist with your knees slightly bent. Let your arms dangle close to the floor. Breathe in slowly and deeply as you return to a standing position. Roll up slowly and lift your head last.  Hold your breath for just a few seconds in the standing position. Breathe out slowly and bend forward from the waist.  Try yoga or alex chi. These techniques combine exercise and meditation. You may need some training at first to learn them. What can you do to prevent stress? Manage your time. This helps you find time to do the things you want and need to do. Get enough sleep. Your body recovers from the stresses of the day while you are sleeping. Get support. Your family, friends, and community can make a difference in how you experience stress. Where can you learn more? Go to http://www.smith.com/ and enter N032 to learn more about \"Learning About Stress. \"  Current as of: October 6, 2021               Content Version: 13.5  © 2006-2022 Healthwise[employer]. Care instructions adapted under license by [facility] ([facility]). If you have questions about a medical condition or this instruction, always ask your healthcare professional. [place] [address] any warranty or liability for your use of this information.            Learning About Managing Anger  What causes anger? Many things can cause anger: Stress at work or at home. Social situations that make you angry. A response to everyday events. Anger signals your body to prepare for a fight. This reaction is often called \"fight or flight. \" When you get angry, adrenaline and other hormones are released into your blood. Then your blood pressure goes up, your heart beats faster, and you breathe faster. When you express anger in a healthy way, it can inspire change and make you productive. But if you don't have the skills to express anger in a healthy way, anger can build up. You may hurt others--and yourself--emotionally and even physically. Violent behavior often starts with verbal threats or fairly minor incidents. But over time, it can involve physical harm. It can include physical, verbal, or sexual abuse of an intimate partner (domestic violence), a child (child abuse), or an older adult (elder abuse). It can also make you sick. Anger and constant hostility keep your blood pressure high. They increase your chances of having another health problem, such as depression, a heart attack, or a stroke. Some people with post-traumatic stress disorder (PTSD) feel angry and on alert all the time. It may feel like there are no other ways to react when you are angry. But when you learn to work with anger in appropriate and healthy ways, your anger no longer controls you. How can you manage your anger? The first step to managing anger is to be more aware of it. Note the thoughts, feelings, and emotions that you have when you get angry. Practice noticing these signs of anger when you are calm. If you are more aware of the signs of anger, you can take steps to manage it. Here are a few tips: Think before you act. Take time to stop and cool down when you feel yourself getting angry. Count to 10 while you take slow, steady breaths. Practice some other form of mental relaxation.   Learn the feelings that lead to angry outbursts. Anger and hostility may be a symptom of unhappy feelings or depression about your job, your relationship, or other aspects of your personal life. Avoid situations that lead to angry outbursts. If standing in line bothers you, do errands at less busy times. Express anger in a healthy way. You might:  Go for a short walk or jog. Draw, paint, or listen to music to release the anger. Write in a daily journal.  Use \"I\" statements, not \"you\" statements, to discuss your anger. Say \"I don't feel valued when my needs are not being met\" instead of \"You make me mad when you are so inconsiderate. \"  Take care of yourself. Exercise regularly. Eat a variety of healthy foods. Don't skip meals. Try to get 8 hours of sleep each night. Limit your use of alcohol, and don't use drugs. Practice yoga, meditation, or alex chi to relax. Explore other resources that may be available through your job or your community. Contact your human resources department at work. You might be able to get services through an employee assistance program.  Contact your local hospital, mental health facility, or health department. Ask what types of programs or support groups are available in your area. Do not keep guns in your home. If you must have guns in your home, unload them and lock them up. Lock ammunition in a separate place. Keep guns away from children. Where can you find help? If anger or stress starts to harm your work or personal relationships, you might seek help. You can learn ways to manage your feelings and actions. Talk to someone you trust, or find a counselor. There are groups in your area that can connect you with people to talk to. [facility] . This service from the national Substance Abuse and [address] can help you find local counselors. Search online at [employer] [address]. [employer] Technologyhsa.gov or call 0-020-809-HELP (254 740 426), or [place] USAD 5-903.722.3887.   Parents Anonymous. Self-help groups that serve parents under stress, as well as children who have been abused, are available throughout the [place], [place] ([facility]), and [facility]. To find a group in your area, search online or in your phone book under Parents Anonymous or call (927) 614-0232. Where can you learn more? Go to http://www.smith.com/ and enter Z357 to learn more about \"Learning About Managing Anger. \"  Current as of: February 9, 2022               Content Version: 13.5  © 8332-2943 [employer]. Care instructions adapted under license by [facility] ([facility]). If you have questions about a medical condition or this instruction, always ask your healthcare professional. [address] any warranty or liability for your use of this information. Hearing Loss: Care Instructions  Overview     Hearing loss is a sudden or slow decrease in how well you hear. It can range from mild to severe. Permanent hearing loss can occur with aging. It also can happen when you are exposed long-term to loud noise. Examples include listening to loud music, riding motorcycles, or being around other loud machines. Hearing loss can affect your work and home life. It can make you feel lonely or depressed. You may feel that you have lost your independence. But hearing aids and other devices can help you hear better and feel connected to others. Follow-up care is a key part of your treatment and safety. Be sure to make and go to all appointments, and call your doctor if you are having problems. It's also a good idea to know your test results and keep a list of the medicines you take. How can you care for yourself at home? Avoid loud noises whenever possible. This helps keep your hearing from getting worse. Always wear hearing protection around loud noises. Wear a hearing aid as directed. See a professional who can help you pick a hearing aid that fits you.   Have hearing tests as your doctor suggests. They can show whether your hearing has changed. Your hearing aid may need to be adjusted. Use other devices as needed. These may include:  Telephone amplifiers and hearing aids that can connect to a television, stereo, radio, or microphone. Devices that use lights or vibrations. These alert you to the doorbell, a ringing telephone, or a baby monitor. Television closed-captioning. This shows the words at the bottom of the screen. Most new TVs can do this. TTY (text telephone). This lets you type messages back and forth on the telephone instead of talking or listening. These devices are also called TDD. When messages are typed on the keyboard, they are sent over the phone line to a receiving TTY. The message is shown on a monitor. Use text messaging, social media, and email if it is hard for you to communicate by telephone. Try to learn a listening technique called speechreading. It is not lipreading. You pay attention to people's gestures, expressions, posture, and tone of voice. These clues can help you understand what a person is saying. Face the person you are talking to, and have them face you. Make sure the lighting is good. You need to see the other person's face clearly. Think about counseling if you need help to adjust to your hearing loss. When should you call for help? Watch closely for changes in your health, and be sure to contact your doctor if:    You think your hearing is getting worse.     You have new symptoms, such as dizziness or nausea. Where can you learn more? Go to http://www.Kiboo.com.com/ and enter R798 to learn more about \"Hearing Loss: Care Instructions. \"  Current as of: May 4, 2022               Content Version: 13.5  © 0107-8549 Healthwise[employer]. Care instructions adapted under license by [facility] ([facility]).  If you have questions about a medical condition or this instruction, always ask your healthcare professional. [facility]  any warranty or liability for your use of this information. Learning About Activities of Daily Living  What are activities of daily living? Activities of daily living (ADLs) are the basic self-care tasks you do every day. As you age, and if you have health problems, you may find that it's harder to do these things for yourself. That's when you may need some help. Your doctor uses ADLs to measure how much help you need. Knowing what you can and can't do for yourself is an important first step to getting help. And when you have the help you need, you can stay as independent as possible. Your doctor will want to know if you are able to do tasks such as: Take a bath or shower without help. Go to the bathroom by yourself. Dress and undress without help. Shave, comb your hair, and brush teeth on your own. Get in and out of bed or a chair without help. Feed yourself without help. If you are having trouble doing basic self-care tasks, talk with your doctor. You may want to bring a caregiver or family member who can help the doctor understand your needs and abilities. How will a doctor assess your ADLs? Asking about ADLs is part of a routine health checkup your doctor will likely do as you age. Your health check might be done in a doctor's office, in your home, or at a hospital. The goal is to find out if you are having any problems that could make your health problems worse or that make it unsafe for you to be on your own. To measure your ADLs, your doctor will ask how hard it is for you to do routine tasks. He or she may also want to know if you have changed the way you do a task because of a health problem. He or she may watch how you:  Walk back and forth. Keep your balance while you stand or walk. Move from sitting to standing or from a bed to a chair. Button or unbutton a shirt or sweater. Remove and put on your shoes.   It's normal to feel a little worried or anxious if you find you can't do all the things you used to be able to do. Talking with your doctor about ADLs isn't a test that you either pass or fail. It's just a way to get more information about your health and safety. Follow-up care is a key part of your treatment and safety. Be sure to make and go to all appointments, and call your doctor if you are having problems. It's also a good idea to know your test results and keep a list of the medicines you take. Current as of: October 6, 2021               Content Version: 13.5  © 2006-2022 Healthwise, [employer]. Care instructions adapted under license by [facility] ([facility]). If you have questions about a medical condition or this instruction, always ask your healthcare professional. [address] any warranty or liability for your use of this information. Advance Directives: Care Instructions  Overview  An advance directive is a legal way to state your wishes at the end of your life. It tells your family and your doctor what to do if you can't say what you want. There are two main types of advance directives. You can change them any time your wishes change. Living will. This form tells your family and your doctor your wishes about life support and other treatment. The form is also called a declaration. Medical power of . This form lets you name a person to make treatment decisions for you when you can't speak for yourself. This person is called a health care agent (health care proxy, health care surrogate). The form is also called a durable power of  for health care. If you do not have an advance directive, decisions about your medical care may be made by a family member, or by a doctor or a  who doesn't know you. It may help to think of an advance directive as a gift to the people who care for you. If you have one, they won't have to make tough decisions by themselves.   For more information, including forms for your state, see the CaringInfo website (www.caringinfo.org/planning/advance-directives/).  Follow-up care is a key part of your treatment and safety. Be sure to make and go to all appointments, and call your doctor if you are having problems. It's also a good idea to know your test results and keep a list of the medicines you take.  What should you include in an advance directive?  Many states have a unique advance directive form. (It may ask you to address specific issues.) Or you might use a universal form that's approved by many states.  If your form doesn't tell you what to address, it may be hard to know what to include in your advance directive. Use the questions below to help you get started.  Who do you want to make decisions about your medical care if you are not able to?  What life-support measures do you want if you have a serious illness that gets worse over time or can't be cured?  What are you most afraid of that might happen? (Maybe you're afraid of having pain, losing your independence, or being kept alive by machines.)  Where would you prefer to die? (Your home? A hospital? A nursing home?)  Do you want to donate your organs when you die?  Do you want certain Zoroastrian practices performed before you die?  When should you call for help?  Be sure to contact your doctor if you have any questions.  Where can you learn more?  Go to https://www.Revizer.net/patientEd and enter R264 to learn more about \"Advance Directives: Care Instructions.\"  Current as of: June 16, 2022               Content Version: 13.5  © 3228-4704 [employer].   Care instructions adapted under license by [employer]. If you have questions about a medical condition or this instruction, always ask your healthcare professional. [employer] disclaims any warranty or liability for your use of this information.           Starting a Weight Loss Plan: Care Instructions  Overview     If you're thinking about losing weight, it can be  hard to know where to start. Your doctor can help you set up a weight loss plan that best meets your needs. You may want to take a class on nutrition or exercise, or you could join a weight loss support group. If you have questions about how to make changes to your eating or exercise habits, ask your doctor about seeing a registered dietitian or an exercise specialist.  It can be a big challenge to lose weight. But you don't have to make huge changes at once. Make small changes, and stick with them. When those changes become habit, add a few more changes. If you don't think you're ready to make changes right now, try to pick a date in the future. Make an appointment to see your doctor to discuss whether the time is right for you to start a plan. Follow-up care is a key part of your treatment and safety. Be sure to make and go to all appointments, and call your doctor if you are having problems. It's also a good idea to know your test results and keep a list of the medicines you take. How can you care for yourself at home? Set realistic goals. Many people expect to lose much more weight than is likely. A weight loss of 5% to 10% of your body weight may be enough to improve your health. Get family and friends involved to provide support. Talk to them about why you are trying to lose weight, and ask them to help. They can help by participating in exercise and having meals with you, even if they may be eating something different. Find what works best for you. If you do not have time or do not like to cook, a program that offers meal replacement bars or shakes may be better for you. Or if you like to prepare meals, finding a plan that includes daily menus and recipes may be best.  Ask your doctor about other health professionals who can help you achieve your weight loss goals. A dietitian can help you make healthy changes in your diet.   An exercise specialist or  can help you develop a safe and effective exercise program.  A counselor or psychiatrist can help you cope with issues such as depression, anxiety, or family problems that can make it hard to focus on weight loss. Consider joining a support group for people who are trying to lose weight. Your doctor can suggest groups in your area. Where can you learn more? Go to http://www.woods.com/ and enter U357 to learn more about \"Starting a Weight Loss Plan: Care Instructions. \"  Current as of: August 25, 2022               Content Version: 13.5  © 9867-0911 [employer]. Care instructions adapted under license by [facility] ([facility]). If you have questions about a medical condition or this instruction, always ask your healthcare professional. [address] any warranty or liability for your use of this information. A Healthy Heart: Care Instructions  Your Care Instructions     Coronary artery disease, also called heart disease, occurs when a substance called plaque builds up in the vessels that supply oxygen-rich blood to your heart muscle. This can narrow the blood vessels and reduce blood flow. A heart attack happens when blood flow is completely blocked. A high-fat diet, smoking, and other factors increase the risk of heart disease. Your doctor has found that you have a chance of having heart disease. You can do lots of things to keep your heart healthy. It may not be easy, but you can change your diet, exercise more, and quit smoking. These steps really work to lower your chance of heart disease. Follow-up care is a key part of your treatment and safety. Be sure to make and go to all appointments, and call your doctor if you are having problems. It's also a good idea to know your test results and keep a list of the medicines you take. How can you care for yourself at home? Diet    Use less salt when you cook and eat. This helps lower your blood pressure. Taste food before salting.  Add only a little salt when you think you need it. With time, your taste buds will adjust to less salt.     Eat fewer snack items, fast foods, canned soups, and other high-salt, high-fat, processed foods.     Read food labels and try to avoid saturated and trans fats. They increase your risk of heart disease by raising cholesterol levels.     Limit the amount of solid fat-butter, margarine, and shortening-you eat. Use olive, peanut, or canola oil when you cook. Bake, broil, and steam foods instead of frying them.     Eat a variety of fruit and vegetables every day. Dark green, deep orange, red, or yellow fruits and vegetables are especially good for you. Examples include spinach, carrots, peaches, and berries.     Foods high in fiber can reduce your cholesterol and provide important vitamins and minerals. High-fiber foods include whole-grain cereals and breads, oatmeal, beans, brown rice, citrus fruits, and apples.     Eat lean proteins. Heart-healthy proteins include seafood, lean meats and poultry, eggs, beans, peas, nuts, seeds, and soy products.     Limit drinks and foods with added sugar. These include candy, desserts, and soda pop. Lifestyle changes    If your doctor recommends it, get more exercise. Walking is a good choice. Bit by bit, increase the amount you walk every day. Try for at least 30 minutes on most days of the week. You also may want to swim, bike, or do other activities.     Do not smoke. If you need help quitting, talk to your doctor about stop-smoking programs and medicines. These can increase your chances of quitting for good. Quitting smoking may be the most important step you can take to protect your heart. It is never too late to quit.     Limit alcohol to 2 drinks a day for men and 1 drink a day for women. Too much alcohol can cause health problems.     Manage other health problems such as diabetes, high blood pressure, and high cholesterol.  If you think you may have a problem with alcohol or drug use, talk to your doctor. Medicines    Take your medicines exactly as prescribed. Call your doctor if you think you are having a problem with your medicine.     If your doctor recommends aspirin, take the amount directed each day. Make sure you take aspirin and not another kind of pain reliever, such as acetaminophen (Tylenol). When should you call for help? Call 911 if you have symptoms of a heart attack. These may include:    Chest pain or pressure, or a strange feeling in the chest.     Sweating.     Shortness of breath.     Pain, pressure, or a strange feeling in the back, neck, jaw, or upper belly or in one or both shoulders or arms.     Lightheadedness or sudden weakness.     A fast or irregular heartbeat. After you call 911, the  may tell you to chew 1 adult-strength or 2 to 4 low-dose aspirin. Wait for an ambulance. Do not try to drive yourself. Watch closely for changes in your health, and be sure to contact your doctor if you have any problems. Where can you learn more? Go to http://Betterific.smith.com/ and enter F075 to learn more about \"A Healthy Heart: Care Instructions. \"  Current as of: September 7, 2022               Content Version: 13.5  © 2006-2022 Healthwise, [employer]. Care instructions adapted under license by [facility] . If you have questions about a medical condition or this instruction, always ask your healthcare professional. [place] [address] any warranty or liability for your use of this information. Personalized Preventive Plan for Fermin Kincaid - 3/2/2023  Medicare offers a range of preventive health benefits. Some of the tests and screenings are paid in full while other may be subject to a deductible, co-insurance, and/or copay. Some of these benefits include a comprehensive review of your medical history including lifestyle, illnesses that may run in your family, and various assessments and screenings as appropriate.     After reviewing your medical record and screening and assessments performed today your provider may have ordered immunizations, labs, imaging, and/or referrals for you. A list of these orders (if applicable) as well as your Preventive Care list are included within your After Visit Summary for your review. Other Preventive Recommendations:    A preventive eye exam performed by an eye specialist is recommended every 1-2 years to screen for glaucoma; cataracts, macular degeneration, and other eye disorders. A preventive dental visit is recommended every 6 months. Try to get at least 150 minutes of exercise per week or 10,000 steps per day on a pedometer . Order or download the FREE \"Exercise & Physical Activity: Your Everyday Guide\" from [employer]. Call 0-386.413.8000 or search [employer] online. You need 3405-0906 mg of calcium and 3806-6370 IU of vitamin D per day. It is possible to meet your calcium requirement with diet alone, but a vitamin D supplement is usually necessary to meet this goal.  When exposed to the sun, use a sunscreen that protects against both UVA and UVB radiation with an SPF of 30 or greater. Reapply every 2 to 3 hours or after sweating, drying off with a towel, or swimming. Always wear a seat belt when traveling in a car. Always wear a helmet when riding a bicycle or motorcycle.

## 2023-03-07 RX ORDER — FAMOTIDINE 20 MG/1
40 TABLET, FILM COATED ORAL 2 TIMES DAILY
Qty: 180 TABLET | Refills: 1 | Status: SHIPPED | OUTPATIENT
Start: 2023-03-07

## 2023-03-10 ENCOUNTER — TELEPHONE (OUTPATIENT)
Dept: PSYCHIATRY | Age: 63
End: 2023-03-10

## 2023-03-10 ENCOUNTER — APPOINTMENT (OUTPATIENT)
Dept: CT IMAGING | Age: 63
End: 2023-03-10
Payer: MEDICARE

## 2023-03-10 ENCOUNTER — HOSPITAL ENCOUNTER (EMERGENCY)
Age: 63
Discharge: HOME | End: 2023-03-10
Payer: MEDICARE

## 2023-03-10 VITALS
TEMPERATURE: 97.6 F | HEART RATE: 85 BPM | SYSTOLIC BLOOD PRESSURE: 97 MMHG | OXYGEN SATURATION: 100 % | DIASTOLIC BLOOD PRESSURE: 44 MMHG | BODY MASS INDEX: 50.99 KG/M2 | WEIGHT: 293 LBS | RESPIRATION RATE: 18 BRPM

## 2023-03-10 DIAGNOSIS — R10.32 LEFT LOWER QUADRANT ABDOMINAL PAIN: ICD-10-CM

## 2023-03-10 DIAGNOSIS — N30.00 ACUTE CYSTITIS WITHOUT HEMATURIA: ICD-10-CM

## 2023-03-10 DIAGNOSIS — K56.41 FECAL IMPACTION IN RECTUM (HCC): Primary | ICD-10-CM

## 2023-03-10 LAB
ALBUMIN SERPL-MCNC: 4.1 G/DL (ref 3.5–5.2)
ALP BLD-CCNC: 94 U/L (ref 35–104)
ALT SERPL-CCNC: 12 U/L (ref 5–33)
ANION GAP SERPL CALCULATED.3IONS-SCNC: 20 MMOL/L (ref 7–19)
AST SERPL-CCNC: 12 U/L (ref 5–32)
BACTERIA: NEGATIVE /HPF
BASOPHILS ABSOLUTE: 0.1 K/UL (ref 0–0.2)
BASOPHILS RELATIVE PERCENT: 0.4 % (ref 0–1)
BILIRUB SERPL-MCNC: <0.2 MG/DL (ref 0.2–1.2)
BILIRUBIN URINE: NEGATIVE
BLOOD, URINE: NEGATIVE
BUN BLDV-MCNC: 53 MG/DL (ref 8–23)
CALCIUM SERPL-MCNC: 10.4 MG/DL (ref 8.8–10.2)
CHLORIDE BLD-SCNC: 85 MMOL/L (ref 98–111)
CLARITY: ABNORMAL
CO2: 26 MMOL/L (ref 22–29)
COLOR: YELLOW
CREAT SERPL-MCNC: 7 MG/DL (ref 0.5–0.9)
CRYSTALS, UA: ABNORMAL /HPF
EOSINOPHILS ABSOLUTE: 0.3 K/UL (ref 0–0.6)
EOSINOPHILS RELATIVE PERCENT: 1.5 % (ref 0–5)
EPITHELIAL CELLS, UA: 3 /HPF (ref 0–5)
GFR SERPL CREATININE-BSD FRML MDRD: 6 ML/MIN/{1.73_M2}
GLUCOSE BLD-MCNC: 256 MG/DL (ref 74–109)
GLUCOSE URINE: NEGATIVE MG/DL
HCT VFR BLD CALC: 25.9 % (ref 37–47)
HEMOGLOBIN: 8.5 G/DL (ref 12–16)
HYALINE CASTS: 28 /HPF (ref 0–8)
IMMATURE GRANULOCYTES #: 0.2 K/UL
KETONES, URINE: ABNORMAL MG/DL
LEUKOCYTE ESTERASE, URINE: ABNORMAL
LYMPHOCYTES ABSOLUTE: 1.8 K/UL (ref 1.1–4.5)
LYMPHOCYTES RELATIVE PERCENT: 9.8 % (ref 20–40)
MCH RBC QN AUTO: 34.7 PG (ref 27–31)
MCHC RBC AUTO-ENTMCNC: 32.8 G/DL (ref 33–37)
MCV RBC AUTO: 105.7 FL (ref 81–99)
MONOCYTES ABSOLUTE: 0.8 K/UL (ref 0–0.9)
MONOCYTES RELATIVE PERCENT: 4.4 % (ref 0–10)
NEUTROPHILS ABSOLUTE: 15.1 K/UL (ref 1.5–7.5)
NEUTROPHILS RELATIVE PERCENT: 82.6 % (ref 50–65)
NITRITE, URINE: NEGATIVE
PDW BLD-RTO: 16.2 % (ref 11.5–14.5)
PH UA: 5 (ref 5–8)
PLATELET # BLD: 308 K/UL (ref 130–400)
PMV BLD AUTO: 9.6 FL (ref 9.4–12.3)
POTASSIUM REFLEX MAGNESIUM: 3.9 MMOL/L (ref 3.5–5)
PROTEIN UA: 300 MG/DL
RBC # BLD: 2.45 M/UL (ref 4.2–5.4)
RBC UA: 2 /HPF (ref 0–4)
SODIUM BLD-SCNC: 131 MMOL/L (ref 136–145)
SPECIFIC GRAVITY UA: 1.02 (ref 1–1.03)
TOTAL PROTEIN: 7.9 G/DL (ref 6.6–8.7)
TSH REFLEX FT4: 4.12 UIU/ML (ref 0.35–5.5)
UROBILINOGEN, URINE: 0.2 E.U./DL
WBC # BLD: 18.2 K/UL (ref 4.8–10.8)
WBC UA: 121 /HPF (ref 0–5)

## 2023-03-10 PROCEDURE — 96374 THER/PROPH/DIAG INJ IV PUSH: CPT

## 2023-03-10 PROCEDURE — 99285 EMERGENCY DEPT VISIT HI MDM: CPT

## 2023-03-10 PROCEDURE — 6360000002 HC RX W HCPCS: Performed by: NURSE PRACTITIONER

## 2023-03-10 PROCEDURE — 84443 ASSAY THYROID STIM HORMONE: CPT

## 2023-03-10 PROCEDURE — 6360000004 HC RX CONTRAST MEDICATION: Performed by: NURSE PRACTITIONER

## 2023-03-10 PROCEDURE — 81001 URINALYSIS AUTO W/SCOPE: CPT

## 2023-03-10 PROCEDURE — 87086 URINE CULTURE/COLONY COUNT: CPT

## 2023-03-10 PROCEDURE — 74177 CT ABD & PELVIS W/CONTRAST: CPT

## 2023-03-10 PROCEDURE — 80053 COMPREHEN METABOLIC PANEL: CPT

## 2023-03-10 PROCEDURE — 2580000003 HC RX 258: Performed by: NURSE PRACTITIONER

## 2023-03-10 PROCEDURE — 36415 COLL VENOUS BLD VENIPUNCTURE: CPT

## 2023-03-10 PROCEDURE — 85025 COMPLETE CBC W/AUTO DIFF WBC: CPT

## 2023-03-10 RX ORDER — CEPHALEXIN 500 MG/1
500 CAPSULE ORAL 2 TIMES DAILY
Qty: 10 CAPSULE | Refills: 0 | Status: SHIPPED | OUTPATIENT
Start: 2023-03-10 | End: 2023-03-15

## 2023-03-10 RX ADMIN — CEFTRIAXONE 1000 MG: 1 INJECTION, POWDER, FOR SOLUTION INTRAMUSCULAR; INTRAVENOUS at 21:08

## 2023-03-10 RX ADMIN — IOPAMIDOL 90 ML: 755 INJECTION, SOLUTION INTRAVENOUS at 20:21

## 2023-03-10 ASSESSMENT — PAIN - FUNCTIONAL ASSESSMENT: PAIN_FUNCTIONAL_ASSESSMENT: 0-10

## 2023-03-10 ASSESSMENT — PAIN DESCRIPTION - DESCRIPTORS: DESCRIPTORS: DISCOMFORT

## 2023-03-10 ASSESSMENT — ENCOUNTER SYMPTOMS
CONSTIPATION: 1
ABDOMINAL PAIN: 1

## 2023-03-10 ASSESSMENT — PAIN SCALES - GENERAL: PAINLEVEL_OUTOF10: 4

## 2023-03-10 NOTE — TELEPHONE ENCOUNTER
Called to remind pt of her appt for 3/13 @ 12 PM    Was unable to lvm     Electronically signed by Kristen Max on 3/10/2023 at 11:48 AM

## 2023-03-10 NOTE — ED PROVIDER NOTES
[facility] EMERGENCY DEPT  eMERGENCY dEPARTMENT eNCOUnter      Pt Name: Leo Nixon  MRN: [id number]  [place] 1960  Date of evaluation: 3/10/2023  Provider: Chaka Schmitt       Chief Complaint   Patient presents with    Constipation     Pt having diarrhea but 3 days ago became constipated. Pt tried to manually de compact herself at home but could not get it out. HISTORY OF PRESENT ILLNESS   (Location/Symptom, Timing/Onset,Context/Setting, Quality, Duration, Modifying Factors, Severity)  Note limiting factors. Asif Goff a 58 y.o. female who presents to the emergency department for evaluation of constipation. Pt tells me that she has had left lower abdominal pain over past 3 days. She relates that she stopped taking miralax due to problems with diarrhea. She has not had bowel movement in past 3-4 days reporting problems with constipation. She denies concern for blood in stool, fevers as well as vomiting. She gives history of ESRD treated with hemodialysis at home. She tells me that she has not missed any home hemodialysis treatments. [facility]    Nursing Notes were reviewed. REVIEW OF SYSTEMS    (2-9 systems for level 4, 10 or more for level 5)     Review of Systems   Gastrointestinal:  Positive for abdominal pain and constipation. All other systems reviewed and are negative. A complete review of systems was performed and is negative except as noted above in the HPI.        PAST MEDICAL HISTORY     Past Medical History:   Diagnosis Date    Acute hemodialysis encounter ([facility] 8/10/2021    Acute hypoxemic respiratory failure ([facility] 8/10/2021    Anemia in end-stage renal disease ([facility] 2/9/2023    Anxiety     Arthritis     CHF (congestive heart failure) (HCC)     CKD (chronic kidney disease)     stage 4    Colon polyp     Depression     Embolism - blood clot 2004    Hemodialysis patient ([facility]     5 days a week at home, takes Sunday + one other day off each week    Hx of blood clots     Hyperlipidemia     Hypertension     Hypertension associated with diabetes ([facility] 10/17/2016    Obesity, morbid, BMI 50 or higher ([facility]     Sleep apnea     CPAP    Thyroid disease     Type II or unspecified type diabetes mellitus without mention of complication, not stated as uncontrolled          SURGICAL HISTORY       Past Surgical History:   Procedure Laterality Date    CHOLECYSTECTOMY, LAPAROSCOPIC  1986    DIALYSIS CATHETER INSERTION N/A 08/11/2021    INSERTION CATHETER DIALYSIS performed by Payam Corrales MD at [place] [address] Left 06/18/2021    LEFT BRACHIAL-CEPHALIC AV FISTULA CREATION performed by Payam Corrales MD at [place] [address] Left 07/27/2021    LEFT UPPER EXTREMITY CEPHALIC VEIN SUPERFICIALIZATION performed by Payam Corarles MD at [address] [place] [address]  2005    FISTULAGRAM Left 09/30/2022    LEFT UPPER EXTREMITY AV FISTULAGRAM, POSSIBLE BALLOON ANGIOPLASTY, STENT POSS REVISION performed by Payam Corrales MD at [facility] ([address] [place] [address])  10/17/2018    POLYPECTOMY      ~2012 had colon polyps, 2022 had a benign polyp and 2 possibly cancerous polyps, DUE FOR REPEAT IN DEC 2022    RECTAL SURGERY  1981    fistula repair    TONSILLECTOMY AND ADENOIDECTOMY      at age 12 years    VASCULAR SURGERY  07/12/2021    [facility]- Ultrasound-guided cannulation left distal upper arm cephalic vein with 4 [place] glide sheath, Left upper extremity fistulogram's including venography the superior vena cava    VASCULAR SURGERY  07/27/2021    [facility]- Superficialization of the left upper arm cephalic vein arteriovenous fistula         CURRENT MEDICATIONS       Previous Medications    ALLOPURINOL (ZYLOPRIM) 100 MG TABLET    Take 100 mg by mouth 2 times daily    ASPIRIN 81 MG TABLET    Take 81 mg by mouth daily With Dinner    BUPROPION (WELLBUTRIN SR) 150 MG EXTENDED RELEASE TABLET    Take 1 tablet by mouth daily    BUSPIRONE (BUSPAR) 10 MG TABLET    Take 1 tablet by mouth 2 times daily    CALCITRIOL (ROCALTROL) 0.5 MCG CAPSULE    Take 0.5 mcg by mouth daily    CARVEDILOL (COREG) 6.25 MG TABLET    Take 6.25 mg by mouth 2 times daily (with meals)    CETIRIZINE (ZYRTEC) 10 MG TABLET    Take 10 mg by mouth as needed for Allergies     CITALOPRAM (CELEXA) 40 MG TABLET    Take 40 mg by mouth daily    DIAZEPAM (VALIUM) 2 MG TABLET    Take 2 mg by mouth as needed for Anxiety.      DOCUSATE SODIUM (COLACE) 100 MG CAPSULE    Take 1 capsule by mouth 2 times daily    EPOETIN WEI (EPOGEN;PROCRIT) 07578 UNIT/ML INJECTION    Inject 10,000 Units into the skin every 14 days    EVOLOCUMAB (REPATHA SC)    Inject into the skin every 14 days    FLUTICASONE (FLONASE) 50 MCG/ACT NASAL SPRAY    2 sprays by Nasal route daily as needed for Rhinitis     INSULIN REGULAR HUMAN (HUMULIN R U-500 KWIKPEN SC)    Inject into the skin OMNI POD 1 UNIT PER HOUR AND BOLUS 25 UNITS Q AM, 15 UNITS AT HS    LAMOTRIGINE (LAMICTAL) 100 MG TABLET    Take 1 tablet by mouth nightly    LEVOTHYROXINE (SYNTHROID) 88 MCG TABLET    Take 88 mcg by mouth Daily     ONDANSETRON (ZOFRAN ODT) 4 MG DISINTEGRATING TABLET    Take 1 tablet by mouth every 8 hours as needed for Nausea or Vomiting    POTASSIUM CHLORIDE (KLOR-CON) 20 MEQ PACKET    Take 20 mEq by mouth daily    ROSUVASTATIN (CRESTOR) 20 MG TABLET    Take 20 mg by mouth daily     SEMAGLUTIDE,0.25 OR 0.5MG/DOS, (OZEMPIC, 0.25 OR 0.5 MG/DOSE,) 2 MG/1.5ML SOPN    Inject 2 mg into the skin once a week    SUCROFERRIC OXYHYDROXIDE (VELPHORO) 500 MG CHEW    Take 2 tablets by mouth 3 times daily (before meals)    VITAMIN C (ASCORBIC ACID) 500 MG TABLET    Take 500 mg by mouth 2 times daily       ALLERGIES     Codeine    FAMILY HISTORY       Family History   Problem Relation Age of Onset    Lung Cancer Mother     Brain Cancer Mother     Heart Attack Father     Prostate Cancer Father     Heart Disease Father     Stroke Father     Obesity Father     No Known Problems Brother     Uterine Cancer Maternal Grandmother     Cervical Cancer Maternal Grandmother     Diabetes Maternal Grandfather     Other Maternal Grandfather         histoplasmosis    Obesity Paternal Grandmother     Diabetes Paternal Grandfather     Kidney Disease Paternal Grandfather     Other Daughter         Kristy Hoover Tabes syndrome          SOCIAL HISTORY       Social History     Socioeconomic History    Marital status:      Spouse name: Mr. 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 Barbara Wise, Her , [phone]    AMBULATES: uses a wheel chair when out, walks at home independently    DOMICILED: has stairs in her home to the den and washer which she does not use     Social Determinants of Health     Financial Resource Strain: High Risk    Difficulty of Paying Living Expenses: Hard   Food Insecurity: Food Insecurity Present    Worried About Running Out of Food in the Last Year: Sometimes true    Ran Out of Food in the Last Year: Sometimes true   Transportation Needs: Unknown    Lack of Transportation (Non-Medical):  No   Physical Activity: Inactive    Days of Exercise per Week: 0 days    Minutes of Exercise per Session: 0 min   Housing Stability: Unknown    Unstable Housing in the Last Year: No       SCREENINGS    [place] Coma Scale  Eye Opening: Spontaneous  Best Verbal Response: Oriented  Best Motor Response: Obeys commands  [place] Coma Scale Score: 15       PHYSICAL EXAM    (up to 7 for level 4, 8 or more for level 5)     ED Triage Vitals   BP Temp Temp src Heart Rate Resp SpO2 Height Weight   03/10/23 1402 03/10/23 1404 -- 03/10/23 1402 03/10/23 1402 03/10/23 1402 -- 03/10/23 1402   (!) 108/53 97.6 °F (36.4 °C)  88 16 98 %  (!) 306 lb 7 oz (139 kg)       Physical Exam  Vitals reviewed.   HENT:      Right Ear: External ear normal.      Left Ear: External ear normal.   Eyes:      Conjunctiva/sclera: Conjunctivae normal.   Abdominal:      Tenderness: There is abdominal tenderness (mild tenderness to palpate left lower abdomen).      Comments: Chaperoned digital rectal exam, soft stool noted high in rectal vault unable to remove manually. Brown stool noted on exam glove   Neurological:      Mental Status: She is alert.       DIAGNOSTIC RESULTS     EKG: All EKG's are interpreted by the Emergency Department Physician who either signs or Co-signs this chart in the absence of acardiologist.        RADIOLOGY:   Non-plain film images such as CT, Ultrasound andMRI are read by the radiologist. Plain radiographic images are visualized and preliminarily interpreted by the emergency physician with the below findings:        Interpretation per the Radiologist below, if available at the time of this note:    CT ABDOMEN PELVIS W IV CONTRAST Additional Contrast? Oral   Final Result   Findings compatible with constipation.   No intra-abdominal inflammatory process evident.   Additional findings per body of the report.            ED BEDSIDE ULTRASOUND:   Performed by ED Physician - none    LABS:  Labs Reviewed   CBC WITH AUTO DIFFERENTIAL - Abnormal; Notable for the following components:       Result Value    WBC 18.2 (*)     RBC 2.45 (*)     Hemoglobin 8.5 (*)     Hematocrit 25.9 (*)     .7 (*)     MCH 34.7 (*)     MCHC 32.8 (*)     RDW 16.2 (*)     Neutrophils % 82.6 (*)     Lymphocytes % 9.8 (*)     Neutrophils Absolute 15.1 (*)     All other components within normal  limits   COMPREHENSIVE METABOLIC PANEL W/ REFLEX TO MG FOR LOW K - Abnormal; Notable for the following components:    Sodium 131 (*)     Chloride 85 (*)     Anion Gap 20 (*)     Glucose 256 (*)     BUN 53 (*)     Creatinine 7.0 (*)     Est, Glom Filt Rate 6 (*)     Calcium 10.4 (*)     All other components within normal limits   URINALYSIS WITH REFLEX TO CULTURE - Abnormal; Notable for the following components:    Clarity, UA TURBID (*)     Ketones, Urine TRACE (*)     Leukocyte Esterase, Urine MODERATE (*)     All other components within normal limits   MICROSCOPIC URINALYSIS - Abnormal; Notable for the following components:    Bacteria, UA NEGATIVE (*)     Crystals, UA NEG (*)     Hyaline Casts, UA 28 (*)     WBC,  (*)     All other components within normal limits   CULTURE, URINE   TSH WITH REFLEX TO FT4       All other labs were within normal range or not returned as of this dictation. RE-ASSESSMENT     Pt has had bowel movement after enema. She relates that she feels like she is not \"empty\" offered to have repeat enema given however she wants to try miralax at home. Reviewed labs and CT report with patient in presence of family. EMERGENCY DEPARTMENT COURSE and DIFFERENTIALDIAGNOSIS/MDM:   Vitals:    Vitals:    03/10/23 1402 03/10/23 1404   BP: (!) 108/53    Pulse: 88    Resp: 16    Temp:  97.6 °F (36.4 °C)   SpO2: 98%    Weight: (!) 306 lb 7 oz (139 kg)        MDM        CONSULTS:  None    PROCEDURES:  Unless otherwise notedbelow, none     Procedures    FINAL IMPRESSION     1. Fecal impaction in rectum ([address]    2. Left lower quadrant abdominal pain    3.  Acute cystitis without hematuria          DISPOSITION/PLAN   DISPOSITION Decision To Discharge 03/10/2023 09:14:02 PM      PATIENT REFERRED TO:  Lidia Reed MD  [address] [place] [address] [phone]    In 3 days      DISCHARGE MEDICATIONS:       Current Discharge Medication List           Medication List START taking these medications      cephALEXin 500 MG capsule  Commonly known as: Keflex  Take 1 capsule by mouth 2 times daily for 5 days            ASK your doctor about these medications      allopurinol 100 MG tablet  Commonly known as: ZYLOPRIM     aspirin 81 MG tablet     buPROPion 150 MG extended release tablet  Commonly known as: WELLBUTRIN SR  Take 1 tablet by mouth daily     busPIRone 10 MG tablet  Commonly known as: BUSPAR  Take 1 tablet by mouth 2 times daily     calcitRIOL 0.5 MCG capsule  Commonly known as: ROCALTROL     carvedilol 6.25 MG tablet  Commonly known as: COREG     cetirizine 10 MG tablet  Commonly known as: ZYRTEC     citalopram 40 MG tablet  Commonly known as: CELEXA     diazePAM 2 MG tablet  Commonly known as: VALIUM     docusate sodium 100 MG capsule  Commonly known as: Colace  Take 1 capsule by mouth 2 times daily     epoetin jose ramon 79495 UNIT/ML injection  Commonly known as: EPOGEN;PROCRIT     fluticasone 50 MCG/ACT nasal spray  Commonly known as: FLONASE     HUMULIN R U-500 KWIKPEN SC     lamoTRIgine 100 MG tablet  Commonly known as: LAMICTAL  Take 1 tablet by mouth nightly     levothyroxine 88 MCG tablet  Commonly known as: SYNTHROID     ondansetron 4 MG disintegrating tablet  Commonly known as: Zofran ODT  Take 1 tablet by mouth every 8 hours as needed for Nausea or Vomiting     Ozempic (0.25 or 0.5 MG/DOSE) 2 MG/1.5ML Sopn  Generic drug: Semaglutide(0.25 or 0.5MG/DOS)     potassium chloride 20 MEQ packet  Commonly known as: KLOR-CON     REPATHA SC     rosuvastatin 20 MG tablet  Commonly known as: CRESTOR     Velphoro 500 MG Chew chewable tablet  Generic drug: sucroferric oxyhydroxide     vitamin C 500 MG tablet  Commonly known as: ASCORBIC ACID               Where to Get Your Medications        These medications were sent to [facility] [address] [facility] [phone]  [address] [place] [address] [address] [facility] [place] [address]      Phone: [phone]   cephALEXin 500 MG capsule           (Pleasenote that portions of this note were completed with a voice recognition program.  Efforts were made to edit the dictations but occasionally words are mis-transcribed.)       JAMES Carver - JHONATHAN  03/10/23 2120

## 2023-03-12 LAB — URINE CULTURE, ROUTINE: NORMAL

## 2023-03-13 ENCOUNTER — OFFICE VISIT (OUTPATIENT)
Dept: PSYCHIATRY | Age: 63
End: 2023-03-13

## 2023-03-13 VITALS
HEART RATE: 73 BPM | OXYGEN SATURATION: 100 % | TEMPERATURE: 97.6 F | WEIGHT: 293 LBS | BODY MASS INDEX: 48.82 KG/M2 | DIASTOLIC BLOOD PRESSURE: 60 MMHG | HEIGHT: 65 IN | SYSTOLIC BLOOD PRESSURE: 121 MMHG

## 2023-03-13 DIAGNOSIS — F33.1 MAJOR DEPRESSIVE DISORDER, RECURRENT, MODERATE (HCC): Primary | ICD-10-CM

## 2023-03-13 DIAGNOSIS — G47.00 INSOMNIA, UNSPECIFIED TYPE: ICD-10-CM

## 2023-03-13 DIAGNOSIS — F41.1 GENERALIZED ANXIETY DISORDER: ICD-10-CM

## 2023-03-13 RX ORDER — TRAZODONE HYDROCHLORIDE 50 MG/1
50 TABLET ORAL NIGHTLY
Qty: 90 TABLET | Refills: 3 | Status: SHIPPED | OUTPATIENT
Start: 2023-03-13 | End: 2023-06-11

## 2023-03-13 ASSESSMENT — PATIENT HEALTH QUESTIONNAIRE - PHQ9
9. THOUGHTS THAT YOU WOULD BE BETTER OFF DEAD, OR OF HURTING YOURSELF: 0
SUM OF ALL RESPONSES TO PHQ QUESTIONS 1-9: 13
5. POOR APPETITE OR OVEREATING: 1
8. MOVING OR SPEAKING SO SLOWLY THAT OTHER PEOPLE COULD HAVE NOTICED. OR THE OPPOSITE, BEING SO FIGETY OR RESTLESS THAT YOU HAVE BEEN MOVING AROUND A LOT MORE THAN USUAL: 1
6. FEELING BAD ABOUT YOURSELF - OR THAT YOU ARE A FAILURE OR HAVE LET YOURSELF OR YOUR FAMILY DOWN: 1
SUM OF ALL RESPONSES TO PHQ9 QUESTIONS 1 & 2: 3
4. FEELING TIRED OR HAVING LITTLE ENERGY: 3
SUM OF ALL RESPONSES TO PHQ QUESTIONS 1-9: 13
10. IF YOU CHECKED OFF ANY PROBLEMS, HOW DIFFICULT HAVE THESE PROBLEMS MADE IT FOR YOU TO DO YOUR WORK, TAKE CARE OF THINGS AT HOME, OR GET ALONG WITH OTHER PEOPLE: 2
1. LITTLE INTEREST OR PLEASURE IN DOING THINGS: 1
2. FEELING DOWN, DEPRESSED OR HOPELESS: 2
SUM OF ALL RESPONSES TO PHQ QUESTIONS 1-9: 13
7. TROUBLE CONCENTRATING ON THINGS, SUCH AS READING THE NEWSPAPER OR WATCHING TELEVISION: 1
3. TROUBLE FALLING OR STAYING ASLEEP: 3
SUM OF ALL RESPONSES TO PHQ QUESTIONS 1-9: 13

## 2023-03-13 ASSESSMENT — COLUMBIA-SUICIDE SEVERITY RATING SCALE - C-SSRS
1. WITHIN THE PAST MONTH, HAVE YOU WISHED YOU WERE DEAD OR WISHED YOU COULD GO TO SLEEP AND NOT WAKE UP?: NO
6. HAVE YOU EVER DONE ANYTHING, STARTED TO DO ANYTHING, OR PREPARED TO DO ANYTHING TO END YOUR LIFE?: NO
2. HAVE YOU ACTUALLY HAD ANY THOUGHTS OF KILLING YOURSELF?: NO
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
## 2023-03-16 ENCOUNTER — TELEPHONE (OUTPATIENT)
Dept: PSYCHIATRY | Age: 63
End: 2023-03-16

## 2023-03-16 DIAGNOSIS — F33.1 MAJOR DEPRESSIVE DISORDER, RECURRENT, MODERATE (HCC): Primary | ICD-10-CM

## 2023-03-16 DIAGNOSIS — F41.1 GENERALIZED ANXIETY DISORDER: ICD-10-CM

## 2023-03-16 NOTE — TELEPHONE ENCOUNTER
Sent referral to [facility] for therapy.     Electronically signed by Kaleigh Hillman MA on 3/16/2023 at 2:54 PM

## 2023-03-20 ENCOUNTER — TELEPHONE (OUTPATIENT)
Dept: PSYCHIATRY | Age: 63
End: 2023-03-20

## 2023-03-20 ENCOUNTER — TELEPHONE (OUTPATIENT)
Dept: PRIMARY CARE CLINIC | Age: 63
End: 2023-03-20

## 2023-03-20 NOTE — TELEPHONE ENCOUNTER
Contacted pt today as requested by Dr Carmelo Ellis to see how she is doing since her appt last week. Pt stated that she is doing her meds as directed by Dr Carmelo Ellis. She stated she had completed the Lamtical taper and is no longer taking it. She stated she had also stopped the Buspar as directed by Dr Carmelo Ellis. Pt stated that the Trazodone 50 mg did not help with sleep and the 100 mg did not help. She stated she is using her CPAP. She stated she was up at 2 am last night and took a Valium and then feel asleep for awhile. Pt is on home dialysis and now has Covid. She stated that she threw up bile yesterday. Pt encouraged to let her PCP know about the Covid and emesis due to her medical problems. Pt stated she has a call into her PCP and they have not called back yet. Pt informed that Dr Carmelo Ellis is out of the office this week and Ala Badger will be given the above info. Pt informed that she would be called back if he gave any new directives. Pt stated that she can continue to try the Trazodone and see what happens. She was encouraged to call back as needed with any questions or concerns.

## 2023-03-20 NOTE — TELEPHONE ENCOUNTER
Patient called and left a voicemail stating she has tested positive for COVID on a home test on Monday 03/20/2023. She states her daughter tested positive on Thursday and was given medications for being positive. She is asking if there are any medications she should be given for a positive result. Please advise.

## 2023-03-22 ENCOUNTER — TELEPHONE (OUTPATIENT)
Dept: PSYCHIATRY | Age: 63
End: 2023-03-22

## 2023-03-22 NOTE — TELEPHONE ENCOUNTER
MA called [facility] to get an update on the referral our office sent out to them for therapy. Pt is scheduled for 04/07/23 @ 9:30 for their intake appt.     Notified the provider     Electronically signed by Jurgen Mckay MA on 3/22/2023 at 8:32 AM

## 2023-03-27 DIAGNOSIS — F41.8 MIXED ANXIETY AND DEPRESSIVE DISORDER: ICD-10-CM

## 2023-03-27 RX ORDER — CITALOPRAM 40 MG/1
40 TABLET ORAL DAILY
Qty: 30 TABLET | Refills: 0 | OUTPATIENT
Start: 2023-03-27

## 2023-03-28 ENCOUNTER — TELEPHONE (OUTPATIENT)
Dept: GASTROENTEROLOGY | Facility: CLINIC | Age: 63
End: 2023-03-28
Payer: MEDICARE

## 2023-03-28 NOTE — TELEPHONE ENCOUNTER
PT IS DUE FOR A REPEAT ENDOSCOPY IN June 2023. SHE DID HAVE ONE IN December 2022.  DOES SHE NEED ANOTHER OV BEFORE YOU CAN SCHEDULE HER SCOPE?

## 2023-03-29 NOTE — PROGRESS NOTES
Skin:     General: Skin is warm and dry. Findings: No rash. Neurological:      Mental Status: She is alert and oriented to person, place, and time. Coordination: Coordination normal.      Gait: Gait abnormal.   Psychiatric:         Behavior: Behavior normal.         Thought Content: Thought content normal.       CBC reviewed by me  Lab Results   Component Value Date    WBC 15.90 (H) 03/30/2023    HGB 8.3 (L) 03/30/2023    HCT 27.5 (L) 03/30/2023    .4 (H) 03/30/2023     03/30/2023     Lab Results   Component Value Date    NEUTROABS 12.71 (H) 03/30/2023       VISIT DIAGNOSES  1. Leukocytosis, unspecified type    2. Anemia, chronic renal failure, stage 5 (HCC)      ASSESSMENT/PLAN:    1. Leukocytosis    She has stage V chronic renal failure from diabetic nephropathy as well as hypertensive nephrosclerosis on chronic hemodialysis since 2021. He does receive Mircera every 2 weeks. He previously received IV iron as needed. She previously was followed by [facility] where she received JEAN CLAUDE prior to starting on hemodialysis. They released her once she started getting her Mircera with dialysis. Her prior work-up through [facility] included a bone marrow procedure. CT-guided bone marrow 6/23/2021  Normocellular bone marrow (40-50% cellularity) with maturing trilineage hematopoiesis  No evidence of any involvement by lymphoma  Plasma cells (5%) are polytypic  Normal karyotypic analysis. FISH study is negative for BCR/ABL 1 rearrangement  Negative FISH STUDY for MDS. [facility] study does not show any clinically significant variants    Sara reports that she did have 1 unit packed red blood cell transfusion last month. She denies any GI bleeding. She has an occasional discoloration of her underwear from urine-she does not make much urine whatsoever. She has an occasional nosebleed. WBC today is 15.9. She is 79.8% PMN.   I discussed the potential causes could be

## 2023-03-30 ENCOUNTER — OFFICE VISIT (OUTPATIENT)
Dept: PULMONOLOGY | Age: 63
End: 2023-03-30
Payer: MEDICARE

## 2023-03-30 ENCOUNTER — HOSPITAL ENCOUNTER (OUTPATIENT)
Dept: INFUSION THERAPY | Age: 63
Discharge: HOME | End: 2023-03-30
Payer: MEDICARE

## 2023-03-30 ENCOUNTER — TELEPHONE (OUTPATIENT)
Dept: PSYCHIATRY | Age: 63
End: 2023-03-30

## 2023-03-30 ENCOUNTER — OFFICE VISIT (OUTPATIENT)
Dept: HEMATOLOGY | Age: 63
End: 2023-03-30
Payer: MEDICARE

## 2023-03-30 VITALS
BODY MASS INDEX: 48.82 KG/M2 | HEART RATE: 80 BPM | HEIGHT: 65 IN | OXYGEN SATURATION: 98 % | SYSTOLIC BLOOD PRESSURE: 128 MMHG | WEIGHT: 293 LBS | DIASTOLIC BLOOD PRESSURE: 76 MMHG

## 2023-03-30 VITALS
OXYGEN SATURATION: 100 % | WEIGHT: 293 LBS | SYSTOLIC BLOOD PRESSURE: 117 MMHG | TEMPERATURE: 97.4 F | HEIGHT: 65 IN | HEART RATE: 78 BPM | BODY MASS INDEX: 48.82 KG/M2 | DIASTOLIC BLOOD PRESSURE: 68 MMHG

## 2023-03-30 DIAGNOSIS — D72.829 LEUKOCYTOSIS, UNSPECIFIED TYPE: ICD-10-CM

## 2023-03-30 DIAGNOSIS — D63.1 ANEMIA, CHRONIC RENAL FAILURE, STAGE 5 (HCC): ICD-10-CM

## 2023-03-30 DIAGNOSIS — E66.01 MORBID OBESITY (HCC): ICD-10-CM

## 2023-03-30 DIAGNOSIS — D72.829 LEUKOCYTOSIS, UNSPECIFIED TYPE: Primary | ICD-10-CM

## 2023-03-30 DIAGNOSIS — G47.8 NON-RESTORATIVE SLEEP: ICD-10-CM

## 2023-03-30 DIAGNOSIS — N18.5 ANEMIA, CHRONIC RENAL FAILURE, STAGE 5 (HCC): ICD-10-CM

## 2023-03-30 DIAGNOSIS — G47.33 OSA ON CPAP: Primary | ICD-10-CM

## 2023-03-30 DIAGNOSIS — Z99.89 OSA ON CPAP: Primary | ICD-10-CM

## 2023-03-30 DIAGNOSIS — G47.10 HYPERSOMNIA: ICD-10-CM

## 2023-03-30 LAB
BASOPHILS # BLD: 0.07 K/UL (ref 0.01–0.08)
BASOPHILS NFR BLD: 0.4 % (ref 0.1–1.2)
CRP SERPL HS-MCNC: 1.96 MG/DL (ref 0–0.5)
EOSINOPHIL # BLD: 0.55 K/UL (ref 0.04–0.54)
EOSINOPHIL NFR BLD: 3.5 % (ref 0.7–7)
ERYTHROCYTE [DISTWIDTH] IN BLOOD BY AUTOMATED COUNT: 16.6 % (ref 11.7–14.4)
ERYTHROCYTE [SEDIMENTATION RATE] IN BLOOD BY WESTERGREN METHOD: >140 MM/HR (ref 0–25)
FERRITIN SERPL-MCNC: >2000 NG/ML (ref 13–150)
FOLATE SERPL-MCNC: 16.5 NG/ML (ref 4.8–37.3)
HAPTOGLOB SERPL-MCNC: 387 MG/DL (ref 30–200)
HCT VFR BLD AUTO: 27.5 % (ref 34.1–44.9)
HCT VFR BLD AUTO: 27.5 % (ref 34.1–44.9)
HGB BLD-MCNC: 8.3 G/DL (ref 11.2–15.7)
IRON SATN MFR SERPL: 22 % (ref 14–50)
IRON SERPL-MCNC: 52 UG/DL (ref 37–145)
LDH SERPL-CCNC: 117 U/L (ref 120–246)
LYMPHOCYTES # BLD: 1.71 K/UL (ref 1.18–3.74)
LYMPHOCYTES NFR BLD: 10.8 % (ref 19.3–53.1)
MCH RBC QN AUTO: 33.3 PG (ref 25.6–32.2)
MCHC RBC AUTO-ENTMCNC: 30.2 G/DL (ref 32.3–35.5)
MCV RBC AUTO: 110.4 FL (ref 79.4–94.8)
MONOCYTES # BLD: 0.74 K/UL (ref 0.24–0.82)
MONOCYTES NFR BLD: 4.7 % (ref 4.7–12.5)
NEUTROPHILS # BLD: 12.71 K/UL (ref 1.56–6.13)
NEUTS SEG NFR BLD: 79.8 % (ref 34–71.1)
PLATELET # BLD AUTO: 225 K/UL (ref 182–369)
PMV BLD AUTO: 9.5 FL (ref 7.4–10.4)
RBC # BLD AUTO: 2.49 M/UL (ref 3.93–5.22)
RETICS # AUTO: 0.07 M/UL (ref 0.03–0.12)
RETICS/RBC NFR: 3.32 % (ref 0.5–1.5)
TIBC SERPL-MCNC: 240 UG/DL (ref 250–400)
VIT B12 SERPL-MCNC: 813 PG/ML (ref 211–946)
WBC # BLD AUTO: 15.9 K/UL (ref 3.98–10.04)

## 2023-03-30 PROCEDURE — 85025 COMPLETE CBC W/AUTO DIFF WBC: CPT

## 2023-03-30 PROCEDURE — 3074F SYST BP LT 130 MM HG: CPT | Performed by: PHYSICIAN ASSISTANT

## 2023-03-30 PROCEDURE — 3074F SYST BP LT 130 MM HG: CPT | Performed by: INTERNAL MEDICINE

## 2023-03-30 PROCEDURE — 3078F DIAST BP <80 MM HG: CPT | Performed by: INTERNAL MEDICINE

## 2023-03-30 PROCEDURE — 36415 COLL VENOUS BLD VENIPUNCTURE: CPT

## 2023-03-30 PROCEDURE — 99212 OFFICE O/P EST SF 10 MIN: CPT

## 2023-03-30 PROCEDURE — 99204 OFFICE O/P NEW MOD 45 MIN: CPT | Performed by: INTERNAL MEDICINE

## 2023-03-30 PROCEDURE — 3078F DIAST BP <80 MM HG: CPT | Performed by: PHYSICIAN ASSISTANT

## 2023-03-30 PROCEDURE — 83615 LACTATE (LD) (LDH) ENZYME: CPT

## 2023-03-30 PROCEDURE — 85045 AUTOMATED RETICULOCYTE COUNT: CPT

## 2023-03-30 PROCEDURE — 99204 OFFICE O/P NEW MOD 45 MIN: CPT | Performed by: PHYSICIAN ASSISTANT

## 2023-03-30 ASSESSMENT — ENCOUNTER SYMPTOMS
COLOR CHANGE: 0
ABDOMINAL PAIN: 0
PHOTOPHOBIA: 0
TROUBLE SWALLOWING: 0
ABDOMINAL DISTENTION: 0
CHEST TIGHTNESS: 0
CONSTIPATION: 1
COUGH: 0
DIARRHEA: 1
EYE ITCHING: 0
SHORTNESS OF BREATH: 1
NAUSEA: 1
WHEEZING: 0
SHORTNESS OF BREATH: 0
BLOOD IN STOOL: 0
BACK PAIN: 0
COUGH: 0
ANAL BLEEDING: 0
WHEEZING: 0
SORE THROAT: 0
BACK PAIN: 1
VOICE CHANGE: 0
EYE DISCHARGE: 0
VOMITING: 0
ABDOMINAL DISTENTION: 0
ABDOMINAL PAIN: 0
APNEA: 1
RHINORRHEA: 0

## 2023-03-30 ASSESSMENT — PROMIS GLOBAL HEALTH SCALE
IN THE PAST 7 DAYS, HOW OFTEN HAVE YOU BEEN BOTHERED BY EMOTIONAL PROBLEMS, SUCH AS FEELING ANXIOUS, DEPRESSED, OR IRRITABLE [ON A SCALE FROM 1 (NEVER) TO 5 (ALWAYS)]?: 1
SUM OF RESPONSES TO QUESTIONS 2, 4, 5, & 10: 6
IN GENERAL, WOULD YOU SAY YOUR HEALTH IS...[ON A SCALE OF 1 (POOR) TO 5 (EXCELLENT)]: 1
IN GENERAL, HOW WOULD YOU RATE YOUR SATISFACTION WITH YOUR SOCIAL ACTIVITIES AND RELATIONSHIPS [ON A SCALE OF 1 (POOR) TO 5 (EXCELLENT)]?: 2
IN GENERAL, HOW WOULD YOU RATE YOUR MENTAL HEALTH, INCLUDING YOUR MOOD AND YOUR ABILITY TO THINK [ON A SCALE OF 1 (POOR) TO 5 (EXCELLENT)]?: 1
IN THE PAST 7 DAYS, HOW WOULD YOU RATE YOUR FATIGUE ON AVERAGE [ON A SCALE FROM 1 (NONE) TO 5 (VERY SEVERE)]?: 2
IN GENERAL, PLEASE RATE HOW WELL YOU CARRY OUT YOUR USUAL SOCIAL ACTIVITIES (INCLUDES ACTIVITIES AT HOME, AT WORK, AND IN YOUR COMMUNITY, AND RESPONSIBILITIES AS A PARENT, CHILD, SPOUSE, EMPLOYEE, FRIEND, ETC) [ON A SCALE OF 1 (POOR) TO 5 (EXCELLENT)]?: 2
IN GENERAL, HOW WOULD YOU RATE YOUR PHYSICAL HEALTH [ON A SCALE OF 1 (POOR) TO 5 (EXCELLENT)]?: 1
SUM OF RESPONSES TO QUESTIONS 3, 6, 7, & 8: 8
IN THE PAST 7 DAYS, HOW WOULD YOU RATE YOUR PAIN ON AVERAGE [ON A SCALE FROM 0 (NO PAIN) TO 10 (WORST IMAGINABLE PAIN)]?: 3
TO WHAT EXTENT ARE YOU ABLE TO CARRY OUT YOUR EVERYDAY PHYSICAL ACTIVITIES SUCH AS WALKING, CLIMBING STAIRS, CARRYING GROCERIES, OR MOVING A CHAIR [ON A SCALE OF 1 (NOT AT ALL) TO 5 (COMPLETELY)]?: 2
IN GENERAL, WOULD YOU SAY YOUR QUALITY OF LIFE IS...[ON A SCALE OF 1 (POOR) TO 5 (EXCELLENT)]: 2

## 2023-03-30 NOTE — TELEPHONE ENCOUNTER
Called pt to cancel/reschedule appt for 04/17/23 with Dr. Rocio Flannery because the provider will not be in the office that day. Rescheduled for 04/04/23 @ 1:30.     Electronically signed by Markus Angulo MA on 3/30/2023 at 10:28 AM

## 2023-03-31 DIAGNOSIS — F41.1 GENERALIZED ANXIETY DISORDER: Primary | ICD-10-CM

## 2023-03-31 DIAGNOSIS — F33.1 MAJOR DEPRESSIVE DISORDER, RECURRENT, MODERATE (HCC): Primary | ICD-10-CM

## 2023-03-31 RX ORDER — CITALOPRAM 40 MG/1
40 TABLET ORAL DAILY
Qty: 30 TABLET | Refills: 2 | Status: SHIPPED | OUTPATIENT
Start: 2023-03-31

## 2023-03-31 NOTE — PROGRESS NOTES
Refill sent of celexa per patient request via phone call.     Electronically signed by JAMES Araiza CNP on 3/31/2023 at 12:56 PM

## 2023-03-31 NOTE — TELEPHONE ENCOUNTER
Carline Gloria report has been reviewed according to [facility] regulations. 4. Supportive therapy offered. Refer to a therapist.  5. Follow up:    Return in about 4 weeks (around 4/10/2023). 6. The patient has been advised to call with any problems. Instructed to call suicide hotline, 911 or come to the ER if suicidal or symptoms worsen. 7. Controlled substance Treatment Plan: NA  8. The above listed medications have been continued, modifications in meds and other orders/labs as follows:                 Encounter Medications         Orders Placed This Encounter   Medications    traZODone (DESYREL) 50 MG tablet       Sig: Take 1 tablet by mouth nightly       Dispense:  90 tablet       Refill:  3                        No orders of the defined types were placed in this encounter. 9. Additional comments:      10.Over 50% of the total visit time of   53  minutes was spent on counseling and/or coordination of care of:          1. Major depressive disorder, recurrent, moderate (HCC)    2. Generalized anxiety disorder    3.  Insomnia, unspecified type          Danya Warner MD

## 2023-04-01 RX ORDER — DIAZEPAM 2 MG/1
2 TABLET ORAL PRN
Qty: 20 TABLET | Refills: 0 | Status: SHIPPED | OUTPATIENT
Start: 2023-04-01 | End: 2023-05-01

## 2023-04-02 LAB
COPPER SERPL-MCNC: 144 UG/DL (ref 80–155)
ZINC SERPL-MCNC: 70.7 UG/DL (ref 60–120)

## 2023-04-03 ENCOUNTER — TELEPHONE (OUTPATIENT)
Dept: HEMATOLOGY | Age: 63
End: 2023-04-03

## 2023-04-03 ENCOUNTER — TELEPHONE (OUTPATIENT)
Dept: PSYCHIATRY | Age: 63
End: 2023-04-03

## 2023-04-03 LAB
ALBUMIN SERPL-MCNC: 3.72 G/DL (ref 3.75–5.01)
ALPHA1 GLOB SERPL ELPH-MCNC: 0.46 G/DL (ref 0.19–0.46)
ALPHA2 GLOB SERPL ELPH-MCNC: 1.11 G/DL (ref 0.48–1.05)
B-GLOBULIN SERPL ELPH-MCNC: 0.76 G/DL (ref 0.48–1.1)
DEPRECATED KAPPA LC FREE/LAMBDA SER: 1.21 {RATIO} (ref 0.26–1.65)
GAMMA GLOB SERPL ELPH-MCNC: 0.94 G/DL (ref 0.62–1.51)
IGA SERPL-MCNC: 141 MG/DL (ref 68–408)
IGG SERPL-MCNC: 907 MG/DL (ref 768–1632)
IGM SERPL-MCNC: 109 MG/DL (ref 35–263)
INTERPRETATION SERPL IFE-IMP: ABNORMAL
INTERPRETATION SERPL IFE-IMP: ABNORMAL
KAPPA LC FREE SER-MCNC: 125.22 MG/L (ref 3.3–19.4)
LAMBDA LC FREE SERPL-MCNC: 103.44 MG/L (ref 5.71–26.3)
PROT SERPL-MCNC: 7 G/DL (ref 6.3–8.2)

## 2023-04-03 NOTE — TELEPHONE ENCOUNTER
NORRIS Jiménez completed [employer] screening follow up call. SW introduced self and explained role and source of support. Patient denies needs or concerns at this time. SW encouraged the patient to call if assistance is needed in the future.

## 2023-04-03 NOTE — TELEPHONE ENCOUNTER
Called to remind pt of her appt for 4/4 @ 1:30 PM    Was unable to lvm     Electronically signed by Leia Phipps on 4/3/2023 at 2:23 PM

## 2023-04-05 ENCOUNTER — TELEPHONE (OUTPATIENT)
Dept: PSYCHIATRY | Age: 63
End: 2023-04-05

## 2023-04-05 NOTE — TELEPHONE ENCOUNTER
Called pt was a no show. Called to check on them and    -Pt asked to reschedule and was rescheduled  04/19/23 @ 8:30.     Electronically signed by Gia Matta MA on 4/5/2023 at 11:42 AM

## 2023-04-06 ENCOUNTER — OFFICE VISIT (OUTPATIENT)
Dept: PRIMARY CARE CLINIC | Age: 63
End: 2023-04-06
Payer: MEDICARE

## 2023-04-06 VITALS
TEMPERATURE: 97.1 F | OXYGEN SATURATION: 99 % | WEIGHT: 293 LBS | DIASTOLIC BLOOD PRESSURE: 60 MMHG | RESPIRATION RATE: 20 BRPM | SYSTOLIC BLOOD PRESSURE: 120 MMHG | BODY MASS INDEX: 48.82 KG/M2 | HEART RATE: 74 BPM | HEIGHT: 65 IN

## 2023-04-06 DIAGNOSIS — Z51.81 ENCOUNTER FOR THERAPEUTIC DRUG LEVEL MONITORING: ICD-10-CM

## 2023-04-06 DIAGNOSIS — G50.0 RIGHT TRIGEMINAL NEURALGIA: Primary | ICD-10-CM

## 2023-04-06 PROCEDURE — 3074F SYST BP LT 130 MM HG: CPT | Performed by: INTERNAL MEDICINE

## 2023-04-06 PROCEDURE — 99213 OFFICE O/P EST LOW 20 MIN: CPT | Performed by: INTERNAL MEDICINE

## 2023-04-06 PROCEDURE — 3078F DIAST BP <80 MM HG: CPT | Performed by: INTERNAL MEDICINE

## 2023-04-06 RX ORDER — CARBAMAZEPINE 200 MG/1
200 TABLET, EXTENDED RELEASE ORAL 2 TIMES DAILY
Qty: 60 TABLET | Refills: 3 | Status: SHIPPED | OUTPATIENT
Start: 2023-04-06

## 2023-04-06 ASSESSMENT — ENCOUNTER SYMPTOMS
VOMITING: 0
DIARRHEA: 0
RHINORRHEA: 0
COUGH: 0
SORE THROAT: 0
ABDOMINAL PAIN: 0

## 2023-04-06 NOTE — ASSESSMENT & PLAN NOTE
Uncontrolled, Patient was told that this is a clinical diagnosis there is no diagnostic testing patient will be empirically started on carbamazepine XR twice a day if she is too sedated with a twice a day and she gets relief with once a day she can go ahead and do that if there is no improvement I will refer her to neurology

## 2023-04-06 NOTE — PROGRESS NOTES
[facility] - PODIATRY    Today's Date: 04/21/2023     Patient Name: Maria C Shrestha  MRN: [id number]  CSN: [id number]  PCP: Fatmata Wallace MD  Referring Provider: Fatmata Wallace,*    SUBJECTIVE     Chief Complaint   Patient presents with   • Establish Care     Fatmata Wallace MD-01/10/2023-new patient- GREAT TOE PAIN, LEFT.- pt states left great nail feels ingrown. Hard to cut nails they have gotten so thick- pt pain 4/10 at worst   • Diabetes     69mg/dl BG this am     HPI: Maria C Shrestha, a 63 y.o.female, comes to clinic as a(n) new patient presenting for diabetic foot exam and complaining of painful toenails. Patient has h/o hypothyroidism, anemia, anxiety, arthritis, cellulitis, CKD, depression, thyroid disease, DM, HLD, HTN, fatigue, gallbladder abscess, obesity,. Patient is IDDM with last stated BG level of 69mg/dl. States that glucose is typically well controlled. States that her toenails are long, thickened, and irregular and that she is unable to reach her nails now to care for them herself. Uses wheelchair for long distances. Has CKD and is treated with dialysis.  Admits pain at 4/10 level and described as aching and nagging. Denies previous treatment. Denies any constitutional symptoms. No other pedal complaints at this time.    Past Medical History:   Diagnosis Date   • Acquired hypothyroidism 02/06/2017   • Allergic    • Anemia    • Anemia due to stage 4 chronic kidney disease 08/18/2020   • Anxiety    • Arthritis    • Cellulitis    • CHF (congestive heart failure)    • Chronic kidney disease     3rd stage   • Depression    • Disease of thyroid gland    • Dyslipidemia    • Elevated cholesterol    • Essential hypertension    • Fatigue    • Gallbladder abscess    • Hearing loss    • History of transfusion    • Light headed    • Limited range of motion (ROM) of shoulder     right   • Obesity    • Palpitation    • Short of breath on exertion    • Sinusitis    • Sleep  apnea    • Stage 4 chronic kidney disease 09/16/2020   • Type 2 diabetes mellitus    • Type II diabetes mellitus, uncontrolled    • Wears glasses      Past Surgical History:   Procedure Laterality Date   • ADENOIDECTOMY     • ANAL FISTULA REPAIR      x 2    • ARTERIOVENOUS FISTULA Left 08/2021   • CHOLECYSTECTOMY     • COLONOSCOPY  01/02/2014   • COLONOSCOPY N/A 03/22/2017    Procedure: COLONOSCOPY WITH ANESTHESIA;  Surgeon: oMno Linder MD;  Location: [facility] ENDOSCOPY;  Service:    • COLONOSCOPY N/A 05/09/2022    Procedure: COLONOSCOPY WITH ANESTHESIA;  Surgeon: Mono Linder MD;  Location: [facility] ENDOSCOPY;  Service: Gastroenterology;  Laterality: N/A;  pre polyp;brbpr  post  Dr. Soliman   • D & C HYSTEROSCOPY N/A 07/25/2018    Procedure: DILATATION AND CURETTAGE HYSTEROSCOPY;  Surgeon: Michael Castaneda MD;  Location: [facility] OR;  Service: Obstetrics/Gynecology   • DILATATION AND CURETTAGE      X 2   • ENDOSCOPY     • ENDOSCOPY N/A 05/09/2022    Procedure: ESOPHAGOGASTRODUODENOSCOPY WITH ANESTHESIA;  Surgeon: Mono Linder MD;  Location: [facility] ENDOSCOPY;  Service: Gastroenterology;  Laterality: N/A;  pre screen  post gastric polyps  Dr. Soliman   • ENDOSCOPY N/A 11/28/2022    Procedure: ESOPHAGOGASTRODUODENOSCOPY WITH ANESTHESIA;  Surgeon: Mono Linder MD;  Location: [facility] ENDOSCOPY;  Service: Gastroenterology;  Laterality: N/A;  pre: hx gastric polyp  post: Retained food, gastritis  dr sebastián soliman   • ENDOSCOPY N/A 12/29/2022    Procedure: ESOPHAGOGASTRODUODENOSCOPY;  Surgeon: Mono Linder MD;  Location: [facility] ENDOSCOPY;  Service: Gastroenterology;  Laterality: N/A;  preop; hx of polyps  postop gastric polyps  PCP Fatmata Wallace MD   • HYSTERECTOMY     • TONSILLECTOMY       Family History   Problem Relation Age of Onset   • Diabetes Other    • Heart failure Other    • Cancer Other    • Kidney disease Other    • Lung cancer Mother    • Heart disease Father    • Diabetes Father   "  • Obesity Father    • Stroke Father    • Prostate cancer Father    • Cancer Maternal Grandmother    • Uterine cancer Maternal Grandmother    • Diabetes Maternal Grandfather    • Cancer Paternal Grandmother    • Colon cancer Paternal Grandmother    • Diabetes Paternal Grandfather    • Heart disease Paternal Grandfather    • No Known Problems Sister    • No Known Problems Brother    • No Known Problems Daughter    • No Known Problems Maternal Aunt    • No Known Problems Paternal Aunt    • BRCA 1/2 Neg Hx    • Breast cancer Neg Hx    • Endometrial cancer Neg Hx    • Ovarian cancer Neg Hx      Social History     Socioeconomic History   • Marital status:    Tobacco Use   • Smoking status: Never   • Smokeless tobacco: Never   Vaping Use   • Vaping Use: Never used   Substance and Sexual Activity   • Alcohol use: No   • Drug use: No   • Sexual activity: Defer     Birth control/protection: Post-menopausal     Allergies   Allergen Reactions   • Codeine Nausea Only and Nausea And Vomiting     N/v/felt hot     Current Outpatient Medications   Medication Sig Dispense Refill   • allopurinol (ZYLOPRIM) 100 MG tablet Take 1 tablet by mouth.     • Ascorbic Acid (VITAMIN C PO) Take  by mouth Daily.     • aspirin 81 MG tablet Take 1 tablet by mouth Daily. Stop 7/22/2018     • BD Insulin Syringe U/F 31G X 5/16\" 1 ML misc AS DIRECTED FOUR TIMES A  each 1   • buPROPion SR (Wellbutrin SR) 100 MG 12 hr tablet Take 1 tablet by mouth 2 (Two) Times a Day. 60 tablet 1   • busPIRone (BUSPAR) 10 MG tablet Take 2 tablets by mouth 2 (Two) Times a Day. 180 tablet 0   • calcitriol (ROCALTROL) 0.5 MCG capsule Take 1 capsule by mouth Daily.     • carvedilol (COREG) 6.25 MG tablet Take 1 tablet by mouth.     • cetirizine (zyrTEC) 10 MG tablet Take 1 tablet by mouth.     • citalopram (CeleXA) 40 MG tablet Take 1 tablet by mouth Daily. 30 tablet 0   • diazePAM (Valium) 5 MG tablet Take 1 tablet by mouth As Needed for Anxiety. 30 tablet 0 "   • Docusate Calcium (STOOL SOFTENER PO) Take  by mouth 2 (Two) Times a Day.     • epoetin elsa (EPOGEN,PROCRIT) 90265 UNIT/ML injection Inject 1 mL under the skin into the appropriate area as directed Every 14 (Fourteen) Days.     • famotidine (PEPCID) 20 MG tablet Take 2 tablets by mouth 2 (Two) Times a Day. 180 tablet 1   • fluticasone (FLONASE) 50 MCG/ACT nasal spray 1 spray into the nostril(s) as directed by provider 2 (Two) Times a Day.     • Insulin Disposable Pump (Omnipod 5 G6 Pod, Gen 5,) misc 1 each Every Other Day. 15 each 6   • insulin regular (HUMULIN R) 500 UNIT/ML CONCENTRATED injection Inject  under the skin into the appropriate area as directed 3 (Three) Times a Day Before Meals. 120 mL 3   • Insulin Regular Human, Conc, (HumuLIN R U-500 KwikPen) 500 UNIT/ML solution pen-injector CONCENTRATED injection 150 units twice daily 18 pen 3   • Insulin Regular Human, Conc, (HumuLIN R U-500 KwikPen) 500 UNIT/ML solution pen-injector CONCENTRATED injection 250 units qac tid 45 mL 11   • lamoTRIgine (LaMICtal) 100 MG tablet TAKE ONE TABLET BY MOUTH EVERY DAY 30 tablet 1   • levothyroxine (Synthroid) 100 MCG tablet Take 1 tablet by mouth Daily. 90 tablet 3   • Misc. Devices (Bariatric Rollator) misc Please provide device for patient 1 each 0   • multivitamin with minerals tablet tablet Take 1 tablet by mouth Daily.     • mupirocin (BACTROBAN) 2 % ointment Apply  topically to the appropriate area as directed 3 (Three) Times a Day. (Patient taking differently: Apply 11 application topically to the appropriate area as directed As Needed.) 22 g 0   • ONDANSETRON PO Take  by mouth As Needed.     • POTASSIUM PO Take  by mouth Daily.     • Repatha solution prefilled syringe injection Inject 1 mL under theskin into the appropriate area as directed Every 14 (Fourteen) Days. 2 mL 4   • rosuvastatin (CRESTOR) 20 MG tablet Take 1 tablet by mouth Every Night. 90 tablet 1   • Semaglutide, 2 MG/DOSE, (Ozempic, 2 MG/DOSE,) 8  MG/3ML solution pen-injector Inject 2 mg under the skin into the appropriate area as directed 1 (One) Time Per Week. Please cancel Trulicity 3 mL 3   • sevelamer (RENVELA) 800 MG tablet TAKE 2 TABLETS BY MOUTH THREE TIMES DAILY WITH MEALS AND 1 TABLET WITH A SNACK     • terbinafine (lamISIL) 1 % cream Apply  topically to the appropriate area as directed 2 (Two) Times a Day. 36 g 2     No current facility-administered medications for this visit.     Review of Systems   Constitutional: Negative for chills and fever.   HENT: Negative for congestion.    Respiratory: Negative for shortness of breath.    Cardiovascular: Negative for chest pain and leg swelling.   Gastrointestinal: Negative for constipation, diarrhea, nausea and vomiting.   Musculoskeletal: Positive for arthralgias.        Foot pain   Skin: Negative for wound.   Neurological: Positive for numbness.       OBJECTIVE     Vitals:    23 1005   BP: 128/62   Pulse: 78   SpO2: 100%       PHYSICAL EXAM  GEN:   Accompanied by s/o.     Foot/Ankle Exam    GENERAL  Diabetic foot exam performed    Appearance:  obese  Orientation:  AAOx3  Affect:  appropriate  Assistance:  wheelchair  Right shoe gear: casual shoe  Left shoe gear: casual shoe    VASCULAR     Right Foot Vascularity   Dorsalis pedis:  2+  Posterior tibial:  2+  Skin temperature:  warm  Edema gradin+ and non-pitting  CFT:  3  Pedal hair growth:  Present  Varicosities:  none     Left Foot Vascularity   Dorsalis pedis:  2+  Posterior tibial:  2+  Skin temperature:  warm  Edema gradin+ and non-pitting  CFT:  3  Pedal hair growth:  Present  Varicosities:  none     NEUROLOGIC     Right Foot Neurologic   Normal sensation    Light touch sensation: normal  Vibratory sensation: normal  Hot/Cold sensation: normal     Left Foot Neurologic   Normal sensation    Light touch sensation: normal  Vibratory sensation: normal  Hot/Cold sensation:  normal    MUSCULOSKELETAL     Right Foot Musculoskeletal    Tenderness:  toenail problem    Arch:  Normal  Hallux valgus: No       Left Foot Musculoskeletal   Tenderness:  toenail problem  Arch:  Normal  Hallux valgus: No      MUSCLE STRENGTH     Right Foot Muscle Strength   Foot dorsiflexion:  5  Foot plantar flexion:  5  Foot inversion:  5  Foot eversion:  5     Left Foot Muscle Strength   Foot dorsiflexion:  5  Foot plantar flexion:  5  Foot inversion:  5  Foot eversion:  5    RANGE OF MOTION     Right Foot Range of Motion   Foot and ankle ROM within normal limits       Left Foot Range of Motion   Foot and ankle ROM within normal limits      DERMATOLOGIC      Right Foot Dermatologic   Skin  Positive for tinea (moccasin distribution).   Nails  1.  Positive for elongated, onychomycosis, abnormal thickness and subungual debris.  2.  Positive for onychomycosis, abnormal thickness and subungual debris.  3.  Positive for onychomycosis, abnormal thickness and subungual debris.  4.  Positive for onychomycosis, abnormal thickness and subungual debris.  5.  Positive for onychomycosis, abnormal thickness and subungual debris.     Left Foot Dermatologic   Skin  Positive for tinea (moccasin distribution).   Nails  1.  Positive for elongated, onychomycosis, abnormal thickness and subungual debris.  2.  Positive for onychomycosis, abnormal thickness and subungual debris.  3.  Positive for onychomycosis, abnormal thickness, subungual debris and dystrophic nail.  4.  Positive for onychomycosis, abnormally thick and subungual debris.  5.  Positive for onychomycosis, abnormally thick and subungual debris.      RADIOLOGY/NUCLEAR:  No results found.    LABORATORY/CULTURE RESULTS:      PATHOLOGY RESULTS:       ASSESSMENT/PLAN     Diagnoses and all orders for this visit:    1. Onychomycosis (Primary)    2. Controlled type 2 diabetes mellitus with stage 4 chronic kidney disease, without long-term current use of insulin    3. Chronic kidney disease, stage 4 (severe)    4. Encounter for diabetic  foot exam    5. Tinea pedis of both feet  -     terbinafine (lamISIL) 1 % cream; Apply  topically to the appropriate area as directed 2 (Two) Times a Day.  Dispense: 36 g; Refill: 2    6. Gait abnormality      Comprehensive lower extremity examination and evaluation was performed.  Discussed findings and treatment plan including risks, benefits, and treatment options with patient in detail. Patient agreed with treatment plan.  Diabetic foot exam performed.  After verbal consent obtained, nail(s) x10 debrided of length and thickness with nail nipper without incidence  Patient may maintain nails and calluses at home utilizing emery board or pumice stone between visits as needed  Reviewed at home diabetic foot care including daily foot checks   RX Lamisil 1% cream 1 application BID x 3 weeks  Continue diabetic monitoring and control under direction of PCP.  Class 3 Severe Obesity (BMI >=40). Obesity-related health conditions include the following: diabetes mellitus. Obesity is unchanged. BMI is is above average; BMI management plan is completed. We discussed portion control and increasing exercise.  An After Visit Summary was printed and given to the patient at discharge, including (if requested) any available informative/educational handouts regarding diagnosis, treatment, or medications. All questions were answered to patient/family satisfaction. Should symptoms fail to improve or worsen they agree to call or return to clinic or to go to the Emergency Department. Discussed the importance of following up with any needed screening tests/labs/specialist appointments and any requested follow-up recommended by me today. Importance of maintaining follow-up discussed and patient accepts that missed appointments can delay diagnosis and potentially lead to worsening of conditions.  Return in about 3 months (around 7/21/2023)., or sooner if acute issues arise.    Lab Frequency Next Occurrence   Follow Anesthesia Guidelines /  Protocol Once 04/20/2022   Obtain Informed Consent Once 04/25/2022   Diet: Once 05/09/2022   Diet: Once 11/28/2022   Follow Anesthesia Guidelines / Protocol Once 12/05/2022   Diet: Once 12/29/2022       This document has been electronically signed by Sadiq Wong DPM on April 21, 2023 10:31 CDT

## 2023-04-06 NOTE — PROGRESS NOTES
Gypsy Ehcevarria ( 1960) is a 61 y.o. female, Established  here for evaluation of the following chief complaint(s). Otalgia (Right x 1 week)      Patient was encouraged and advised to be compliant with all  medications leads an active lifestyle and promote maintaining a healthy weight, encouraged not to use cigarettes, laboratory results discussed and reviewed with patient's during this visit   ASSESSMENT/PLAN:  Problem List    None        SPENSER 7 SCORE 2022   SPENSER-7 Total Score 9       PHQ Scores 3/13/2023 3/2/2023 1/10/2023 2022   PHQ2 Score 3 2 2 4   PHQ9 Score 13 11 10 13       Results for orders placed or performed during the hospital encounter of 23   CBC with Auto Differential   Result Value Ref Range    WBC 15.90 (H) 3.98 - 10.04 K/uL    RBC 2.49 (L) 3.93 - 5.22 M/uL    Hemoglobin 8.3 (L) 11.2 - 15.7 g/dL    Hematocrit 27.5 (L) 34.1 - 44.9 %    .4 (H) 79.4 - 94.8 fL    MCH 33.3 (H) 25.6 - 32.2 pg    MCHC 30.2 (L) 32.3 - 35.5 g/dL    RDW 16.6 (H) 11.7 - 14.4 %    Platelets 122 645 - 190 K/uL    MPV 9.5 7.4 - 10.4 fL    Neutrophils % 79.8 (H) 34.0 - 71.1 %    Lymphocytes % 10.8 (L) 19.3 - 53.1 %    Monocytes % 4.7 4.7 - 12.5 %    Eosinophils % 3.5 0.7 - 7.0 %    Basophils % 0.4 0.1 - 1.2 %    Neutrophils Absolute 12.71 (H) 1.56 - 6.13 K/uL    Lymphocytes Absolute 1.71 1.18 - 3.74 K/uL    Monocytes Absolute 0.74 0.24 - 0.82 K/uL    Eosinophils Absolute 0.55 (H) 0.04 - 0.54 K/uL    Basophils Absolute 0.07 0.01 - 0.08 K/uL   Lactate Dehydrogenase   Result Value Ref Range     (L) 120 - 246 U/L   Reticulocytes   Result Value Ref Range    Retic Ct Pct 3.32 (H) 0.50 - 1.50 %    Retic Ct Abs 0.0744 0.0250 - 0.1250 M/uL    Hematocrit 27.5 (L) 34.1 - 44.9 %       No follow-ups on file. Otalgia   There is pain in the right ear. This is a recurrent problem. The current episode started more than 1 month ago. The problem occurs constantly. There has been no fever.  The pain is at a severity

## 2023-04-07 ENCOUNTER — PREP FOR SURGERY (OUTPATIENT)
Dept: OTHER | Facility: HOSPITAL | Age: 63
End: 2023-04-07
Payer: MEDICARE

## 2023-04-07 DIAGNOSIS — K31.7 GASTRIC POLYPS: Primary | ICD-10-CM

## 2023-04-17 ENCOUNTER — LAB (OUTPATIENT)
Dept: LAB | Facility: HOSPITAL | Age: 63
End: 2023-04-17
Payer: MEDICARE

## 2023-04-17 ENCOUNTER — DOCUMENTATION (OUTPATIENT)
Dept: ENDOCRINOLOGY | Facility: CLINIC | Age: 63
End: 2023-04-17
Payer: MEDICARE

## 2023-04-17 LAB
ALBUMIN SERPL-MCNC: 4.3 G/DL (ref 3.5–5)
ALBUMIN UR-MCNC: 41.8 MG/DL
ALBUMIN/GLOB SERPL: 1.2 G/DL (ref 1.1–2.5)
ALP SERPL-CCNC: 81 U/L (ref 24–120)
ALT SERPL W P-5'-P-CCNC: 18 U/L (ref 0–35)
ANION GAP SERPL CALCULATED.3IONS-SCNC: 14 MMOL/L (ref 4–13)
AST SERPL-CCNC: 19 U/L (ref 7–45)
AUTO MIXED CELLS #: 0.7 10*3/MM3 (ref 0.1–2.6)
AUTO MIXED CELLS %: 6.6 % (ref 0.1–24)
BILIRUB SERPL-MCNC: 0.3 MG/DL (ref 0.1–1)
BUN SERPL-MCNC: 63 MG/DL (ref 5–21)
BUN/CREAT SERPL: 5.3
CALCIUM SPEC-SCNC: 10.1 MG/DL (ref 8.4–10.4)
CHLORIDE SERPL-SCNC: 96 MMOL/L (ref 98–110)
CHOLEST SERPL-MCNC: 152 MG/DL (ref 130–200)
CO2 SERPL-SCNC: 29 MMOL/L (ref 24–31)
CREAT SERPL-MCNC: 12 MG/DL (ref 0.5–1.4)
CREAT UR-MCNC: 96.9 MG/DL
EGFRCR SERPLBLD CKD-EPI 2021: 3.2 ML/MIN/1.73
ERYTHROCYTE [DISTWIDTH] IN BLOOD BY AUTOMATED COUNT: 16.2 % (ref 12.3–15.4)
FERRITIN SERPL-MCNC: 2207 NG/ML (ref 13–150)
GLOBULIN UR ELPH-MCNC: 3.7 GM/DL
GLUCOSE SERPL-MCNC: 117 MG/DL (ref 70–100)
HBA1C MFR BLD: 6 % (ref 4.8–5.9)
HCT VFR BLD AUTO: 22.8 % (ref 34–46.6)
HDLC SERPL-MCNC: 21 MG/DL
HGB BLD-MCNC: 7.4 G/DL (ref 12–15.9)
IRON 24H UR-MRATE: 73 MCG/DL (ref 37–145)
IRON SATN MFR SERPL: 24 % (ref 20–50)
LDLC SERPL CALC-MCNC: 66 MG/DL (ref 0–99)
LDLC/HDLC SERPL: 2.25 {RATIO}
LYMPHOCYTES # BLD AUTO: 1.3 10*3/MM3 (ref 0.7–3.1)
LYMPHOCYTES NFR BLD AUTO: 13.4 % (ref 19.6–45.3)
MCH RBC QN AUTO: 34.4 PG (ref 26.6–33)
MCHC RBC AUTO-ENTMCNC: 32.5 G/DL (ref 31.5–35.7)
MCV RBC AUTO: 106 FL (ref 79–97)
MICROALBUMIN/CREAT UR: 431.4 MG/G
NEUTROPHILS NFR BLD AUTO: 7.9 10*3/MM3 (ref 1.7–7)
NEUTROPHILS NFR BLD AUTO: 80 % (ref 42.7–76)
PLATELET # BLD AUTO: 260 10*3/MM3 (ref 140–450)
PMV BLD AUTO: 7.8 FL (ref 6–12)
POTASSIUM SERPL-SCNC: 4.1 MMOL/L (ref 3.5–5.3)
PROT SERPL-MCNC: 8 G/DL (ref 6.3–8.7)
RBC # BLD AUTO: 2.15 10*6/MM3 (ref 3.77–5.28)
SODIUM SERPL-SCNC: 139 MMOL/L (ref 135–145)
TIBC SERPL-MCNC: 310 MCG/DL (ref 298–536)
TRANSFERRIN SERPL-MCNC: 208 MG/DL (ref 200–360)
TRIGL SERPL-MCNC: 419 MG/DL (ref 0–149)
TSH SERPL DL<=0.05 MIU/L-ACNC: 7.58 UIU/ML (ref 0.27–4.2)
VIT B12 BLD-MCNC: 385 PG/ML (ref 211–946)
VLDLC SERPL-MCNC: 65 MG/DL (ref 5–40)
WBC NRBC COR # BLD: 9.9 10*3/MM3 (ref 3.4–10.8)

## 2023-04-17 PROCEDURE — 82728 ASSAY OF FERRITIN: CPT | Performed by: INTERNAL MEDICINE

## 2023-04-17 PROCEDURE — 82607 VITAMIN B-12: CPT | Performed by: INTERNAL MEDICINE

## 2023-04-17 PROCEDURE — 84443 ASSAY THYROID STIM HORMONE: CPT | Performed by: INTERNAL MEDICINE

## 2023-04-17 PROCEDURE — 82043 UR ALBUMIN QUANTITATIVE: CPT | Performed by: INTERNAL MEDICINE

## 2023-04-17 PROCEDURE — 85025 COMPLETE CBC W/AUTO DIFF WBC: CPT | Performed by: INTERNAL MEDICINE

## 2023-04-17 PROCEDURE — 82570 ASSAY OF URINE CREATININE: CPT | Performed by: INTERNAL MEDICINE

## 2023-04-17 PROCEDURE — 80061 LIPID PANEL: CPT | Performed by: INTERNAL MEDICINE

## 2023-04-17 PROCEDURE — 83036 HEMOGLOBIN GLYCOSYLATED A1C: CPT | Performed by: INTERNAL MEDICINE

## 2023-04-17 PROCEDURE — 84466 ASSAY OF TRANSFERRIN: CPT | Performed by: INTERNAL MEDICINE

## 2023-04-17 PROCEDURE — 80053 COMPREHEN METABOLIC PANEL: CPT | Performed by: INTERNAL MEDICINE

## 2023-04-17 PROCEDURE — 83540 ASSAY OF IRON: CPT | Performed by: INTERNAL MEDICINE

## 2023-04-17 NOTE — PROGRESS NOTES
Patients Hgb is 7.4 and Hcy is 22.8. Patient is advised to call her nephrologist who treats her anemia. If she has severe SOA or tachycardia she is to go to the ER.

## 2023-04-19 ENCOUNTER — TELEPHONE (OUTPATIENT)
Dept: PSYCHIATRY | Age: 63
End: 2023-04-19

## 2023-04-19 ENCOUNTER — OFFICE VISIT (OUTPATIENT)
Dept: PSYCHIATRY | Age: 63
End: 2023-04-19

## 2023-04-19 VITALS
SYSTOLIC BLOOD PRESSURE: 113 MMHG | BODY MASS INDEX: 48.82 KG/M2 | WEIGHT: 293 LBS | HEART RATE: 75 BPM | HEIGHT: 65 IN | TEMPERATURE: 97.8 F | DIASTOLIC BLOOD PRESSURE: 55 MMHG | OXYGEN SATURATION: 100 %

## 2023-04-19 DIAGNOSIS — F33.1 MAJOR DEPRESSIVE DISORDER, RECURRENT, MODERATE (HCC): Primary | ICD-10-CM

## 2023-04-19 DIAGNOSIS — F41.1 GENERALIZED ANXIETY DISORDER: ICD-10-CM

## 2023-04-19 DIAGNOSIS — G47.00 INSOMNIA, UNSPECIFIED TYPE: ICD-10-CM

## 2023-04-19 RX ORDER — TRAZODONE HYDROCHLORIDE 100 MG/1
100 TABLET ORAL NIGHTLY
Qty: 90 TABLET | Refills: 3 | Status: SHIPPED | OUTPATIENT
Start: 2023-04-19 | End: 2023-07-18

## 2023-04-19 ASSESSMENT — PATIENT HEALTH QUESTIONNAIRE - PHQ9
SUM OF ALL RESPONSES TO PHQ QUESTIONS 1-9: 17
7. TROUBLE CONCENTRATING ON THINGS, SUCH AS READING THE NEWSPAPER OR WATCHING TELEVISION: 2
SUM OF ALL RESPONSES TO PHQ QUESTIONS 1-9: 17
10. IF YOU CHECKED OFF ANY PROBLEMS, HOW DIFFICULT HAVE THESE PROBLEMS MADE IT FOR YOU TO DO YOUR WORK, TAKE CARE OF THINGS AT HOME, OR GET ALONG WITH OTHER PEOPLE: 0
9. THOUGHTS THAT YOU WOULD BE BETTER OFF DEAD, OR OF HURTING YOURSELF: 2
SUM OF ALL RESPONSES TO PHQ9 QUESTIONS 1 & 2: 4
1. LITTLE INTEREST OR PLEASURE IN DOING THINGS: 2
SUM OF ALL RESPONSES TO PHQ QUESTIONS 1-9: 15
3. TROUBLE FALLING OR STAYING ASLEEP: 1
5. POOR APPETITE OR OVEREATING: 2
2. FEELING DOWN, DEPRESSED OR HOPELESS: 2
6. FEELING BAD ABOUT YOURSELF - OR THAT YOU ARE A FAILURE OR HAVE LET YOURSELF OR YOUR FAMILY DOWN: 2
8. MOVING OR SPEAKING SO SLOWLY THAT OTHER PEOPLE COULD HAVE NOTICED. OR THE OPPOSITE, BEING SO FIGETY OR RESTLESS THAT YOU HAVE BEEN MOVING AROUND A LOT MORE THAN USUAL: 2
SUM OF ALL RESPONSES TO PHQ QUESTIONS 1-9: 17
4. FEELING TIRED OR HAVING LITTLE ENERGY: 2

## 2023-04-19 ASSESSMENT — COLUMBIA-SUICIDE SEVERITY RATING SCALE - C-SSRS
6. HAVE YOU EVER DONE ANYTHING, STARTED TO DO ANYTHING, OR PREPARED TO DO ANYTHING TO END YOUR LIFE?: NO
1. WITHIN THE PAST MONTH, HAVE YOU WISHED YOU WERE DEAD OR WISHED YOU COULD GO TO SLEEP AND NOT WAKE UP?: NO
2. HAVE YOU ACTUALLY HAD ANY THOUGHTS OF KILLING YOURSELF?: NO

## 2023-04-19 NOTE — TELEPHONE ENCOUNTER
Called pt on 04/17/23 to remind them of their appt for 04/19/23    -Pt confirmed    Electronically signed by Walter Conn MA on 4/19/2023 at 8:09 AM

## 2023-04-19 NOTE — PROGRESS NOTES
4/19/2023 8:53 AM   Progress Note          Les Leak 1960      Chief Complaint   Patient presents with    Medication Check         Subjective:    71-year-old white female with history of depression and anxiety, obesity, diabetes, ESRD, hypothyroidism, obstructive sleep apnea on CPAP, presents for follow up. She is on Wellbutrin, Celexa, BuSpar, Valium. Stop BuSpar and lamotrigine was tapered off last time. Stopped BuSpar. Started on trazodone for sleep. Patient now takes 100 mg. Patient is calm and cooperative. Affect is brighter. Smiling and joking. Overall doing better. No suicidal thoughts. Sleeping better on trazodone. Mood still somewhat low these days due to relationship and financial problems. Started seeing a therapist.  Denies the need for medication changes at this time.       BP: BP (!) 113/55   Pulse 75   Temp 97.8 °F (36.6 °C)   Ht 5' 5\" (1.651 m)   Wt (!) 303 lb (137.4 kg)   LMP 01/01/2000   SpO2 100%   BMI 50.42 kg/m²       Review of Systems - 14 point review:  Negative except for    Constitutional: (fevers, chills, night sweats, wt loss/gain, change in appetite, fatigue, somnolence)    HEENT: (ear pain or discharge, hearing loss, ear ringing, sinus pressure, nosebleed, nasal discharge, sore throat, oral sores, tooth pain, bleeding gums, hoarse voice, neck pain)      Cardiovascular: (HTN, chest pain, elevated cholesterol/lipids, palpitations, leg swelling, leg pain with walking)    Respiratory: (cough, wheezing, snoring, SOB with activity (dyspnea), SOB while lying flat (orthopnea), awakening with severe SOB (paroxysmal nocturnal dyspnea))    Gastrointestinal: (NVD, constipation, abdominal pain, bright red stools, black tarry stools, stool incontinence)     Genitourinary:  (pelvic pain, burning or frequency of urination, urinary urgency, blood in urine incomplete bladder emptying, urinary incontinence, STD; MEN: testicular pain or swelling, erectile dysfunction; WOMEN:

## 2023-04-20 ENCOUNTER — TELEPHONE (OUTPATIENT)
Dept: PODIATRY | Facility: CLINIC | Age: 63
End: 2023-04-20
Payer: MEDICARE

## 2023-04-20 DIAGNOSIS — G50.0 RIGHT TRIGEMINAL NEURALGIA: ICD-10-CM

## 2023-04-20 DIAGNOSIS — Z51.81 ENCOUNTER FOR THERAPEUTIC DRUG LEVEL MONITORING: ICD-10-CM

## 2023-04-20 LAB
BASOPHILS # BLD: 0.1 K/UL (ref 0–0.2)
BASOPHILS NFR BLD: 0.5 % (ref 0–1)
CARBAMAZEPINE SERPL-MCNC: 4.7 UG/ML (ref 4–12)
EOSINOPHIL # BLD: 0.3 K/UL (ref 0–0.6)
EOSINOPHIL NFR BLD: 2.6 % (ref 0–5)
ERYTHROCYTE [DISTWIDTH] IN BLOOD BY AUTOMATED COUNT: 16.5 % (ref 11.5–14.5)
HCT VFR BLD AUTO: 24.6 % (ref 37–47)
HGB BLD-MCNC: 7.5 G/DL (ref 12–16)
IMM GRANULOCYTES # BLD: 0.1 K/UL
LYMPHOCYTES # BLD: 1.3 K/UL (ref 1.1–4.5)
LYMPHOCYTES NFR BLD: 13.2 % (ref 20–40)
MCH RBC QN AUTO: 33.9 PG (ref 27–31)
MCHC RBC AUTO-ENTMCNC: 30.5 G/DL (ref 33–37)
MCV RBC AUTO: 111.3 FL (ref 81–99)
MONOCYTES # BLD: 0.6 K/UL (ref 0–0.9)
MONOCYTES NFR BLD: 5.8 % (ref 0–10)
NEUTROPHILS # BLD: 7.8 K/UL (ref 1.5–7.5)
NEUTS SEG NFR BLD: 77.1 % (ref 50–65)
PLATELET # BLD AUTO: 234 K/UL (ref 130–400)
PMV BLD AUTO: 9.4 FL (ref 9.4–12.3)
RBC # BLD AUTO: 2.21 M/UL (ref 4.2–5.4)
WBC # BLD AUTO: 10.1 K/UL (ref 4.8–10.8)

## 2023-04-20 NOTE — TELEPHONE ENCOUNTER
Called patient regarding appt on 04/21/2023. Left message for patient to return call if any questions or concerns arise.

## 2023-04-21 ENCOUNTER — OFFICE VISIT (OUTPATIENT)
Dept: PODIATRY | Facility: CLINIC | Age: 63
End: 2023-04-21
Payer: MEDICARE

## 2023-04-21 VITALS
HEIGHT: 65 IN | BODY MASS INDEX: 48.82 KG/M2 | OXYGEN SATURATION: 100 % | HEART RATE: 78 BPM | DIASTOLIC BLOOD PRESSURE: 62 MMHG | SYSTOLIC BLOOD PRESSURE: 128 MMHG | WEIGHT: 293 LBS

## 2023-04-21 DIAGNOSIS — N18.4 CONTROLLED TYPE 2 DIABETES MELLITUS WITH STAGE 4 CHRONIC KIDNEY DISEASE, WITHOUT LONG-TERM CURRENT USE OF INSULIN: ICD-10-CM

## 2023-04-21 DIAGNOSIS — B35.1 ONYCHOMYCOSIS: Primary | ICD-10-CM

## 2023-04-21 DIAGNOSIS — E11.9 ENCOUNTER FOR DIABETIC FOOT EXAM: ICD-10-CM

## 2023-04-21 DIAGNOSIS — N18.4 CHRONIC KIDNEY DISEASE, STAGE 4 (SEVERE): ICD-10-CM

## 2023-04-21 DIAGNOSIS — B35.3 TINEA PEDIS OF BOTH FEET: ICD-10-CM

## 2023-04-21 DIAGNOSIS — R26.9 GAIT ABNORMALITY: ICD-10-CM

## 2023-04-21 DIAGNOSIS — E11.22 CONTROLLED TYPE 2 DIABETES MELLITUS WITH STAGE 4 CHRONIC KIDNEY DISEASE, WITHOUT LONG-TERM CURRENT USE OF INSULIN: ICD-10-CM

## 2023-04-21 RX ORDER — PRENATAL VIT 91/IRON/FOLIC/DHA 28-975-200
COMBINATION PACKAGE (EA) ORAL 2 TIMES DAILY
Qty: 36 G | Refills: 2 | Status: SHIPPED | OUTPATIENT
Start: 2023-04-21

## 2023-04-24 ENCOUNTER — TELEPHONE (OUTPATIENT)
Dept: PRIMARY CARE CLINIC | Age: 63
End: 2023-04-24

## 2023-04-24 NOTE — TELEPHONE ENCOUNTER
----- Message from Leobardo Pace MD sent at 4/21/2023  2:19 PM CDT -----  Patient white cell count normal, continues to be anemic, carbamazepine levels normal,

## 2023-04-27 ENCOUNTER — OFFICE VISIT (OUTPATIENT)
Dept: ENDOCRINOLOGY | Facility: CLINIC | Age: 63
End: 2023-04-27
Payer: MEDICARE

## 2023-04-27 VITALS
SYSTOLIC BLOOD PRESSURE: 130 MMHG | DIASTOLIC BLOOD PRESSURE: 60 MMHG | HEIGHT: 65 IN | BODY MASS INDEX: 48.82 KG/M2 | WEIGHT: 293 LBS | OXYGEN SATURATION: 99 % | HEART RATE: 77 BPM

## 2023-04-27 DIAGNOSIS — E11.22 TYPE 2 DIABETES MELLITUS WITH ESRD (END-STAGE RENAL DISEASE): Primary | ICD-10-CM

## 2023-04-27 DIAGNOSIS — N18.6 TYPE 2 DIABETES MELLITUS WITH ESRD (END-STAGE RENAL DISEASE): Primary | ICD-10-CM

## 2023-04-27 DIAGNOSIS — E03.9 ACQUIRED HYPOTHYROIDISM: ICD-10-CM

## 2023-04-27 DIAGNOSIS — E53.8 B12 DEFICIENCY: ICD-10-CM

## 2023-04-27 DIAGNOSIS — I10 ESSENTIAL HYPERTENSION: ICD-10-CM

## 2023-04-27 DIAGNOSIS — E55.9 VITAMIN D DEFICIENCY: ICD-10-CM

## 2023-04-27 DIAGNOSIS — E11.69 MIXED DIABETIC HYPERLIPIDEMIA ASSOCIATED WITH TYPE 2 DIABETES MELLITUS: ICD-10-CM

## 2023-04-27 DIAGNOSIS — E78.2 MIXED DIABETIC HYPERLIPIDEMIA ASSOCIATED WITH TYPE 2 DIABETES MELLITUS: ICD-10-CM

## 2023-04-27 PROBLEM — N18.9 ANEMIA OF CHRONIC RENAL FAILURE: Status: RESOLVED | Noted: 2019-10-09 | Resolved: 2023-04-27

## 2023-04-27 PROBLEM — E07.9 THYROID DISEASE: Status: RESOLVED | Noted: 2017-02-26 | Resolved: 2023-04-27

## 2023-04-27 PROBLEM — N17.9 ACUTE RENAL FAILURE SUPERIMPOSED ON STAGE 3 CHRONIC KIDNEY DISEASE: Status: RESOLVED | Noted: 2017-02-26 | Resolved: 2023-04-27

## 2023-04-27 PROBLEM — Z86.0101 HISTORY OF ADENOMATOUS POLYP OF COLON: Status: RESOLVED | Noted: 2022-04-20 | Resolved: 2023-04-27

## 2023-04-27 PROBLEM — D64.9 ANEMIA: Status: RESOLVED | Noted: 2017-02-26 | Resolved: 2023-04-27

## 2023-04-27 PROBLEM — E83.51 HYPOCALCEMIA: Status: RESOLVED | Noted: 2017-02-26 | Resolved: 2023-04-27

## 2023-04-27 PROBLEM — E78.1 HYPERTRIGLYCERIDEMIA: Status: RESOLVED | Noted: 2017-02-26 | Resolved: 2023-04-27

## 2023-04-27 PROBLEM — D63.8 ANEMIA, CHRONIC DISEASE: Status: RESOLVED | Noted: 2021-06-30 | Resolved: 2023-04-27

## 2023-04-27 PROBLEM — K76.9 DISEASE OF LIVER: Status: RESOLVED | Noted: 2019-10-09 | Resolved: 2023-04-27

## 2023-04-27 PROBLEM — E11.9 DIABETES MELLITUS: Status: RESOLVED | Noted: 2019-10-09 | Resolved: 2023-04-27

## 2023-04-27 PROBLEM — D13.1 ADENOMATOUS POLYP OF STOMACH: Status: RESOLVED | Noted: 2022-11-04 | Resolved: 2023-04-27

## 2023-04-27 PROBLEM — Z99.2: Status: RESOLVED | Noted: 2021-08-10 | Resolved: 2023-04-27

## 2023-04-27 PROBLEM — J96.01 ACUTE HYPOXEMIC RESPIRATORY FAILURE: Status: RESOLVED | Noted: 2021-08-10 | Resolved: 2023-04-27

## 2023-04-27 PROBLEM — D63.1 ANEMIA OF CHRONIC RENAL FAILURE: Status: RESOLVED | Noted: 2019-10-09 | Resolved: 2023-04-27

## 2023-04-27 PROBLEM — N18.4 BENIGN HYPERTENSION WITH CHRONIC KIDNEY DISEASE, STAGE IV: Status: RESOLVED | Noted: 2017-02-26 | Resolved: 2023-04-27

## 2023-04-27 PROBLEM — N18.30 ACUTE RENAL FAILURE SUPERIMPOSED ON STAGE 3 CHRONIC KIDNEY DISEASE: Status: RESOLVED | Noted: 2017-02-26 | Resolved: 2023-04-27

## 2023-04-27 PROBLEM — K31.7 HYPERPLASTIC POLYPS OF STOMACH: Status: RESOLVED | Noted: 2022-05-24 | Resolved: 2023-04-27

## 2023-04-27 PROBLEM — Z86.010 HISTORY OF ADENOMATOUS POLYP OF COLON: Status: RESOLVED | Noted: 2022-04-20 | Resolved: 2023-04-27

## 2023-04-27 PROBLEM — K92.1 BLACK STOOL: Status: RESOLVED | Noted: 2022-04-20 | Resolved: 2023-04-27

## 2023-04-27 PROBLEM — K62.5 BRBPR (BRIGHT RED BLOOD PER RECTUM): Status: RESOLVED | Noted: 2022-04-20 | Resolved: 2023-04-27

## 2023-04-27 PROBLEM — I12.9 BENIGN HYPERTENSION WITH CHRONIC KIDNEY DISEASE, STAGE IV: Status: RESOLVED | Noted: 2017-02-26 | Resolved: 2023-04-27

## 2023-04-27 PROBLEM — R79.1 ABNORMAL COAGULATION PROFILE: Status: RESOLVED | Noted: 2021-06-16 | Resolved: 2023-04-27

## 2023-04-27 RX ORDER — LEVOTHYROXINE SODIUM 112 UG/1
112 TABLET ORAL DAILY
Qty: 30 TABLET | Refills: 11 | Status: SHIPPED | OUTPATIENT
Start: 2023-04-27 | End: 2024-04-26

## 2023-04-27 RX ORDER — ALLOPURINOL 100 MG/1
100 TABLET ORAL DAILY
Qty: 30 TABLET | Refills: 11 | Status: SHIPPED | OUTPATIENT
Start: 2023-04-27

## 2023-04-27 NOTE — PROGRESS NOTES
Maria C Shrestha is a 63 y.o. female who presents for  evaluation of   Type 2 diabetes       Primary Care / Referring Provider  Fatmata Wallace MD    History of Present Illness  Duration/Timing:  Diabetes mellitus type 2        Severity (Complications/Hospitalizations)  Secondary Microvascular Complications:      Diabetic Nephropathy-ESRD    No Diabetic Neuropathy, No retinopathy     Macrovascular      Carotid Artery Disease      Context  Diabetes Regimen:  Insulin, Compliant with regimen  Blood Glucose Readings  Now at goal        Diet    counts carbs, eating only 45 g cho per meal     Exercise:  Does not exercise    Associated Signs/Symptoms  Hyperglycemic Symptoms:  No polyuria, No polydipsia, No polyphagia, Weight loss 110 lbs since 2018   Hypoglycemic Episodes:    documented hypoglycemia           .  PE    AOx3  No visible goiter  Normal Respiratory Effort , Lung CTA  RRR  No Edema    Labs    Lab Results   Component Value Date    WBC 9.90 04/17/2023    HGB 7.4 (C) 04/17/2023    HCT 22.8 (C) 04/17/2023    .0 (H) 04/17/2023     04/17/2023     Lab Results   Component Value Date    GLUCOSE 117 (H) 04/17/2023    BUN 63 (H) 04/17/2023    CREATININE 12.00 (H) 04/17/2023    EGFRIFNONA 9 (A) 09/30/2022    EGFRIFAFRI 10 (L) 09/30/2022    BCR 5.3 (L) 04/17/2023     04/17/2023    K 4.1 04/17/2023    CO2 29.0 04/17/2023    CALCIUM 10.1 04/17/2023    ALBUMIN 4.3 04/17/2023    AST 19 04/17/2023    ALT 18 04/17/2023         Assessment & Plan       ICD-10-CM ICD-9-CM   1. Type 2 diabetes mellitus with ESRD (end-stage renal disease)  E11.22 250.40    N18.6 585.6   2. Mixed diabetic hyperlipidemia associated with type 2 diabetes mellitus  E11.69 250.80    E78.2 272.2   3. Essential hypertension  I10 401.9   4. Vitamin D deficiency  E55.9 268.9   5. B12 deficiency  E53.8 266.2   6. Acquired hypothyroidism  E03.9 244.9       Glycemic Management:       Ambulatory Glucose Profile Report    Days Analyzed : 2  week period ending on 04/27/23      Continuous Glucose Monitory Device:  FreeStyle Filippo 2 and Omnipod 5   Insurance won't pay for dexcom      - 0% very high target range  - 5% high target range  - 90% in target range  - 5% below target range  - GMI 6 %  - Average glucose 112 mg/dl    Interpretation : Diabetes Type 2 Controlled           Trulicity   3 mg weekly - change to ozempic 2 mg , keep   Has constipation , better at managing it      Humulin U 500 through omnipod   Basal 1 unit per hour   Bolus  , minimal , 3 to 4 on the pump   She has some lows overnight , prefers not to decrease basal to 0.9      Baqsimi    Lipid Management    Lab Results   Component Value Date    CHOL 152 04/17/2023    CHLPL 140 (L) 12/07/2022    TRIG 419 (H) 04/17/2023    HDL 21 (L) 04/17/2023    LDL 66 04/17/2023         crestor and repatha, we will keep unless nephrology suggests otherwise     Blood Pressure Management:    Vitals:    04/27/23 1319   BP: 130/60   Pulse: 77   SpO2: 99%         Per nephrology -        Microvascular Complication Monitoring:        ckd stage V    followed by nephrology      Eyes, 2022 nl     No Neuropathy      Weight Related:   Wt Readings from Last 3 Encounters:   04/27/23 (!) 138 kg (304 lb)   04/21/23 (!) 140 kg (308 lb)   12/29/22 (!) 140 kg (308 lb)     Body mass index is 50.59 kg/m².      prefers no bariatric surgery    Now on cpap , needs new study since lost 100 lbs       Bone Health    Lab Results   Component Value Date    PTH 75.3 (H) 10/25/2022    CALCIUM 10.1 04/17/2023       Lab Results   Component Value Date    HOEK40MN 56.3 10/25/2022    SBZG77XS 64.4 09/16/2022    XSFQ39PS 67.4 06/02/2022         For JARETH       Per nephrology       Thyroid Health  Lab Results   Component Value Date    TSH 7.580 (H) 04/17/2023     88 mcgs daily of levothyroixne - 100     112 daily        Other Diabetes Related Aspects       Lab Results   Component Value Date    MORTKLPM73 385 04/17/2023     Lab Results    Component Value Date    WBC 9.90 04/17/2023    HGB 7.4 (C) 04/17/2023    HCT 22.8 (C) 04/17/2023    .0 (H) 04/17/2023     04/17/2023     Lab Results   Component Value Date    IRON 73 04/17/2023    TIBC 310 04/17/2023    FERRITIN 2,207.00 (H) 04/17/2023       Anemia , managed by nephrology and oncology     On procrit    Had Hysterectomy     Systolic Murmur, AS? Echo   Could be due to anemia     Echo and carotid US 2020 , normal echo and less than 50% blockage in carotids, repeat       ===========      Wilman Patel MD          This document has been electronically signed by Wilman Patel MD on April 27, 2023 13:43 CDT

## 2023-05-02 ENCOUNTER — HOSPITAL ENCOUNTER (OUTPATIENT)
Dept: SLEEP CENTER | Age: 63
Discharge: HOME | End: 2023-05-04
Payer: MEDICARE

## 2023-05-02 DIAGNOSIS — E78.1 HYPERTRIGLYCERIDEMIA: ICD-10-CM

## 2023-05-02 PROCEDURE — 95810 POLYSOM 6/> YRS 4/> PARAM: CPT

## 2023-05-02 RX ORDER — ROSUVASTATIN CALCIUM 20 MG/1
20 TABLET, COATED ORAL NIGHTLY
Qty: 30 TABLET | Refills: 0 | Status: SHIPPED | OUTPATIENT
Start: 2023-05-02

## 2023-05-03 NOTE — PROGRESS NOTES
[facility]  [address], [address]  Phone [phone] Fax [phone]     Sleep Study Technician Review    Patient Name:  Lydia Ellis  :   1960  Referring Provider: Mika Hylton *    Brief History:  Lydia Ellis is a 61 y.o. female with a history of GERI , anxiety,CHF, stage 4 chronic kidney disease, depression, diabetes, HTN, and obesity. Pt presented for a split night PSG. Pt is currently on home pap therapy. Patient reported that her last titration was about 10 years ago. Pt reported her current pap device was about 5 yrs. old. Current therapy settings are unknown. Height: 5\" 5\"  Weight: 302lbs  BMI:  50.4  Neck Circ: 17.5\"  Mallampati 3  ESS:  14    Type of Study: Split Night PSG. Time Stage Position Snore Hypopnea Obs Apnea Albin Apnea PAP O2   2200 Wake right lateral No No No No  RA   2300 Wake left lateral No No No No  RA   2400 N2 left lateral No Yes No No  RA   0100 N2 supine Yes Yes Yes No  RA   0200 N2 left lateral Yes Yes Yes No  RA   0300 N3 right lateral Yes Yes Yes No  RA   0400 N2 right lateral Yes Yes Yes No  RA   0500  Lights on N2 right lateral Yes Yes Yes No  RA           RA     Summary: Just after lights off, pt checked her blood sugar which was 66. Pt was concerned about her sugar being low. Pt was given a 7UP to drink. This  corrected pt's blood sugar. Pt had great difficulty achieving and maintaining sleep. A split-night study was not conducted as pt. did not accrue 2 hrs of sleep in time to allow for adequate titration. When pt did sleep severe GERI was noted with an SpO2 daniel of 75%. DME: [facility]        The study was reviewed briefly with Lydia Ellis. She will be notified of the formal results and recommendations after the study is scored and interpreted. The report will be sent to her referring provider.   GERI  Technician: Elia Howe CRT,RPSGT,RST

## 2023-05-09 DIAGNOSIS — G47.31 COMPLEX SLEEP APNEA SYNDROME: Primary | ICD-10-CM

## 2023-05-14 DIAGNOSIS — D63.1 ANEMIA, CHRONIC RENAL FAILURE, STAGE 5 (HCC): Primary | ICD-10-CM

## 2023-05-14 DIAGNOSIS — N18.5 ANEMIA, CHRONIC RENAL FAILURE, STAGE 5 (HCC): Primary | ICD-10-CM

## 2023-05-14 DIAGNOSIS — D72.829 LEUKOCYTOSIS, UNSPECIFIED TYPE: ICD-10-CM

## 2023-05-15 PROBLEM — K31.7 GASTRIC POLYPS: Status: ACTIVE | Noted: 2023-04-11

## 2023-05-17 ENCOUNTER — PRE-ADMISSION TESTING (OUTPATIENT)
Dept: PREADMISSION TESTING | Facility: HOSPITAL | Age: 63
End: 2023-05-17
Payer: MEDICARE

## 2023-05-17 ENCOUNTER — OFFICE VISIT (OUTPATIENT)
Dept: HEMATOLOGY | Age: 63
End: 2023-05-17
Payer: MEDICARE

## 2023-05-17 ENCOUNTER — HOSPITAL ENCOUNTER (OUTPATIENT)
Dept: INFUSION THERAPY | Age: 63
Discharge: HOME | End: 2023-05-17
Payer: MEDICARE

## 2023-05-17 VITALS
SYSTOLIC BLOOD PRESSURE: 128 MMHG | OXYGEN SATURATION: 98 % | BODY MASS INDEX: 50.65 KG/M2 | WEIGHT: 293 LBS | HEART RATE: 81 BPM | DIASTOLIC BLOOD PRESSURE: 76 MMHG

## 2023-05-17 VITALS
RESPIRATION RATE: 20 BRPM | DIASTOLIC BLOOD PRESSURE: 41 MMHG | WEIGHT: 293 LBS | HEIGHT: 66 IN | SYSTOLIC BLOOD PRESSURE: 102 MMHG | OXYGEN SATURATION: 96 % | BODY MASS INDEX: 47.09 KG/M2 | HEART RATE: 88 BPM

## 2023-05-17 DIAGNOSIS — N18.5 ANEMIA, CHRONIC RENAL FAILURE, STAGE 5 (HCC): ICD-10-CM

## 2023-05-17 DIAGNOSIS — D72.829 LEUKOCYTOSIS, UNSPECIFIED TYPE: Primary | ICD-10-CM

## 2023-05-17 DIAGNOSIS — D72.829 LEUKOCYTOSIS, UNSPECIFIED TYPE: ICD-10-CM

## 2023-05-17 DIAGNOSIS — D63.1 ANEMIA, CHRONIC RENAL FAILURE, STAGE 5 (HCC): ICD-10-CM

## 2023-05-17 LAB
ANION GAP SERPL CALCULATED.3IONS-SCNC: 20 MMOL/L (ref 5–15)
BASOPHILS # BLD: 0.05 K/UL (ref 0.01–0.08)
BASOPHILS NFR BLD: 0.5 % (ref 0.1–1.2)
BUN SERPL-MCNC: 70 MG/DL (ref 8–23)
BUN/CREAT SERPL: 7.7 (ref 7–25)
CALCIUM SPEC-SCNC: 9.6 MG/DL (ref 8.6–10.5)
CHLORIDE SERPL-SCNC: 93 MMOL/L (ref 98–107)
CO2 SERPL-SCNC: 25 MMOL/L (ref 22–29)
CREAT SERPL-MCNC: 9.1 MG/DL (ref 0.57–1)
DEPRECATED RDW RBC AUTO: 63.3 FL (ref 37–54)
EGFRCR SERPLBLD CKD-EPI 2021: 4.5 ML/MIN/1.73
EOSINOPHIL # BLD: 0.29 K/UL (ref 0.04–0.54)
EOSINOPHIL NFR BLD: 3.1 % (ref 0.7–7)
ERYTHROCYTE [DISTWIDTH] IN BLOOD BY AUTOMATED COUNT: 15.9 % (ref 12.3–15.4)
ERYTHROCYTE [DISTWIDTH] IN BLOOD BY AUTOMATED COUNT: 16 % (ref 11.7–14.4)
GLUCOSE SERPL-MCNC: 150 MG/DL (ref 65–99)
HCT VFR BLD AUTO: 21.9 % (ref 34–46.6)
HCT VFR BLD AUTO: 25.8 % (ref 34.1–44.9)
HGB BLD-MCNC: 6.7 G/DL (ref 12–15.9)
HGB BLD-MCNC: 7.8 G/DL (ref 11.2–15.7)
LYMPHOCYTES # BLD: 2.13 K/UL (ref 1.18–3.74)
LYMPHOCYTES NFR BLD: 22.8 % (ref 19.3–53.1)
MCH RBC QN AUTO: 33.5 PG (ref 26.6–33)
MCH RBC QN AUTO: 35.5 PG (ref 25.6–32.2)
MCHC RBC AUTO-ENTMCNC: 30.2 G/DL (ref 32.3–35.5)
MCHC RBC AUTO-ENTMCNC: 30.6 G/DL (ref 31.5–35.7)
MCV RBC AUTO: 109.5 FL (ref 79–97)
MCV RBC AUTO: 117.3 FL (ref 79.4–94.8)
MONOCYTES # BLD: 0.56 K/UL (ref 0.24–0.82)
MONOCYTES NFR BLD: 6 % (ref 4.7–12.5)
NEUTROPHILS # BLD: 6.28 K/UL (ref 1.56–6.13)
NEUTS SEG NFR BLD: 67.2 % (ref 34–71.1)
PLATELET # BLD AUTO: 199 10*3/MM3 (ref 140–450)
PLATELET # BLD AUTO: 216 K/UL (ref 182–369)
PMV BLD AUTO: 9.5 FL (ref 6–12)
PMV BLD AUTO: 9.5 FL (ref 7.4–10.4)
POTASSIUM SERPL-SCNC: 4.7 MMOL/L (ref 3.5–5.2)
RBC # BLD AUTO: 2 10*6/MM3 (ref 3.77–5.28)
RBC # BLD AUTO: 2.2 M/UL (ref 3.93–5.22)
SODIUM SERPL-SCNC: 138 MMOL/L (ref 136–145)
WBC # BLD AUTO: 9.35 K/UL (ref 3.98–10.04)
WBC NRBC COR # BLD: 8.7 10*3/MM3 (ref 3.4–10.8)

## 2023-05-17 PROCEDURE — 3078F DIAST BP <80 MM HG: CPT | Performed by: PHYSICIAN ASSISTANT

## 2023-05-17 PROCEDURE — 99213 OFFICE O/P EST LOW 20 MIN: CPT | Performed by: PHYSICIAN ASSISTANT

## 2023-05-17 PROCEDURE — 3074F SYST BP LT 130 MM HG: CPT | Performed by: PHYSICIAN ASSISTANT

## 2023-05-17 PROCEDURE — 80048 BASIC METABOLIC PNL TOTAL CA: CPT

## 2023-05-17 PROCEDURE — 93005 ELECTROCARDIOGRAM TRACING: CPT

## 2023-05-17 PROCEDURE — 36415 COLL VENOUS BLD VENIPUNCTURE: CPT

## 2023-05-17 PROCEDURE — 99211 OFF/OP EST MAY X REQ PHY/QHP: CPT

## 2023-05-17 PROCEDURE — 85027 COMPLETE CBC AUTOMATED: CPT

## 2023-05-17 PROCEDURE — 85025 COMPLETE CBC W/AUTO DIFF WBC: CPT

## 2023-05-17 RX ORDER — HEPARIN SODIUM,PORCINE/PF 1 UNIT/ML
6000 SYRINGE (ML) INTRAVENOUS EVERY 8 HOURS SCHEDULED
COMMUNITY

## 2023-05-17 ASSESSMENT — ENCOUNTER SYMPTOMS
CONSTIPATION: 1
PHOTOPHOBIA: 0
NAUSEA: 0
EYE ITCHING: 0
ABDOMINAL PAIN: 0
VOMITING: 0
ABDOMINAL DISTENTION: 0
DIARRHEA: 1
SORE THROAT: 0
COUGH: 0
BACK PAIN: 1
SHORTNESS OF BREATH: 1
EYE DISCHARGE: 0
VOICE CHANGE: 0
BLOOD IN STOOL: 0
COLOR CHANGE: 0
TROUBLE SWALLOWING: 0
WHEEZING: 0

## 2023-05-17 NOTE — DISCHARGE INSTRUCTIONS
Before you come to the hospital        Arrival time: AS DIRECTED BY OFFICE     YOU MAY TAKE THE FOLLOWING MEDICATION(S) THE MORNING OF SURGERY WITH A SIP OF WATER:   Valium and carvedilol             ALL OTHER HOME MEDICATION CHECK WITH YOUR PHYSICIAN (especially if   you are taking diabetes medicines or blood thinners)    Do not take any Erectile Dysfunction medications (EX: CIALIS, VIAGRA) 24 hours prior to surgery.      If you were given and instructed to use a germ- killing soap, use as directed the night before surgery and again the morning of surgery or as directed by your surgeon. (Use one-half of the bottle with each shower.)   See attached information for How to Use Chlorhexidine for Bathing if applicable.            Eating and drinking restrictions prior to scheduled arrival time    2 Hours before arrival time STOP   Drinking Clear liquids (water, black coffee-NO CREAM,  apple juice-no pulp)      8 Hours before arrival time STOP   All food, full liquids, and dairy products    (It is extremely important that you follow these guidelines to prevent delay or cancelation of your procedure)     Clear Liquids  Water and flavored water                                                                      Clear Fruit juices, such as cranberry juice and apple juice.  Black coffee (NO cream of any kind, including powdered).  Plain tea  Clear bouillon or broth.  Flavored gelatin.  Soda.  Gatorade or Powerade.  Full liquid examples  Juices that have pulp.  Frozen ice pops that contain fruit pieces.  Coffee with creamer  Milk.  Yogurt.                MANAGING PAIN AFTER SURGERY    We know you are probably wondering what your pain will be like after surgery.  Following surgery it is unrealistic to expect you will not have pain.   Pain is how our bodies let us know that something is wrong or cautions us to be careful.  That said, our goal is to make your pain tolerable.    Methods we may use to treat your pain include  (oral or IV medications, PCAs, epidurals, nerve blocks, etc.)   While some procedures require IV pain medications for a short time after surgery, transitioning to pain medications by mouth allows for better management of pain.   Your nurse will encourage you to take oral pain medications whenever possible.  IV medications work almost immediately, but only last a short while.  Taking medications by mouth allows for a more constant level of medication in your blood stream for a longer period of time.      Once your pain is out of control it is harder to get back under control.  It is important you are aware when your next dose of pain medication is due.  If you are admitted, your nurse may write the time of your next dose on the white board in your room to help you remember.      We are interested in your pain and encourage you to inform us about aggravating factors during your visit.   Many times a simple repositioning every few hours can make a big difference.    If your physician says it is okay, do not let your pain prevent you from getting out of bed. Be sure to call your nurse for assistance prior to getting up so you do not fall.      Before surgery, please decide your tolerable pain goal.  These faces help describe the pain ratings we use on a 0-10 scale.   Be prepared to tell us your goal and whether or not you take pain or anxiety medications at home.          Preparing for Surgery  Preparing for surgery is an important part of your care. It can make things go more smoothly and help you avoid complications. The steps leading up to surgery may vary among hospitals. Follow all instructions given to you by your health care providers. Ask questions if you do not understand something. Talk about any concerns that you have.  Here are some questions to consider asking before your surgery:  If my surgery is not an emergency (is elective), when would be the best time to have the surgery?  What arrangements do I need  to make for work, home, or school?  What will my recovery be like? How long will it be before I can return to normal activities?  Will I need to prepare my home? Will I need to arrange care for me or my children?  Should I expect to have pain after surgery? What are my pain management options? Are there nonmedical options that I can try for pain?  Tell a health care provider about:  Any allergies you have.  All medicines you are taking, including vitamins, herbs, eye drops, creams, and over-the-counter medicines.  Any problems you or family members have had with anesthetic medicines.  Any blood disorders you have.  Any surgeries you have had.  Any medical conditions you have.  Whether you are pregnant or may be pregnant.  What are the risks?  The risks and complications of surgery depend on the specific procedure that you have. Discuss all the risks with your health care providers before your surgery. Ask about common surgical complications, which may include:  Infection.  Bleeding or a need for blood replacement (transfusion).  Allergic reactions to medicines.  Damage to surrounding nerves, tissues, or structures.  A blood clot.  Scarring.  Failure of the surgery to correct the problem.  Follow these instructions before the procedure:  Several days or weeks before your procedure  You may have a physical exam by your primary health care provider to make sure it is safe for you to have surgery.  You may have testing. This may include a chest X-ray, blood and urine tests, electrocardiogram (ECG), or other testing.  Ask your health care provider about:  Changing or stopping your regular medicines. This is especially important if you are taking diabetes medicines or blood thinners.  Taking medicines such as aspirin and ibuprofen. These medicines can thin your blood. Do not take these medicines unless your health care provider tells you to take them.  Taking over-the-counter medicines, vitamins, herbs, and  supplements.  Do not use any products that contain nicotine or tobacco, such as cigarettes and e-cigarettes. If you need help quitting, ask your health care provider.  Avoid alcohol.  Ask your health care provider if there are exercises you can do to prepare for surgery.  Eat a healthy diet.   Plan to have someone 18 years of age or older to take you home from the hospital. We will need to verify your ride on the morning of surgery if you are being discharged home on the same day. Tell your ride to be expecting a call from the hospital prior to your procedure.   Plan to have a responsible adult care for you for at least 24 hours after you leave the hospital or clinic. This is important.  The day before your procedure  You may be given antibiotic medicine to take by mouth to help prevent infection. Take it as told by your health care provider.  You may be asked to shower with a germ-killing soap.  Follow instructions from your health care provider about eating and drinking restrictions. This includes gum, mints and hard candy.  Pack comfortable clothes according to your procedure.   The day of your procedure  You may need to take another shower with a germ-killing soap before you leave home in the morning.  With a small sip of water, take only the medicines that you are told to take.  Remove all jewelry including rings.   Leave anything you consider valuable at home except hearing aids if needed.  You do not need to bring your home medications into the hospital.   Do not wear any makeup, nail polish, powder, deodorant, lotion, hair accessories, or anything on your skin or body except your clothes.  If you will be staying in the hospital, bring a case to hold your glasses, contacts, or dentures. You may also want to bring your robe and non-skid footwear.       (Do not use denture adhesives since you will be asked to remove them during  surgery).   If you wear oxygen at home, bring it with you the day of surgery.  If  instructed by your health care provider, bring your sleep apnea device with you on the day of your surgery (if this applies to you).  You may want to leave your suitcase and sleep apnea device in the car until after surgery.   Arrive at the hospital as scheduled.  Bring a friend or family member with you who can help to answer questions and be present while you meet with your health care provider.  At the hospital  When you arrive at the hospital:  Go to registration located at the main entrance of the hospital. You will be registered and given a beeper and a sticker sheet. Take the stickers to the Outpatient nurses desk and place in the black tray. This is to notify staff that you have arrived. Then return to the lobby to wait.   When your beeper lights up and vibrates proceed through the double doors, under the stairs, and a member of the Outpatient Surgery staff will escort you to your preoperative room.  You may have to wear compression sleeves. These help to prevent blood clots and reduce swelling in your legs.  An IV may be inserted into one of your veins.              In the operating room, you may be given one or more of the following:        A medicine to help you relax (sedative).        A medicine to numb the area (local anesthetic).        A medicine to make you fall asleep (general anesthetic).        A medicine that is injected into an area of your body to numb everything below the                      injection site (regional anesthetic).  You may be given an antibiotic through your IV to help prevent infection.  Your surgical site will be marked or identified.    Contact a health care provider if you:  Develop a fever of more than 100.4°F (38°C) or other feelings of illness during the 48 hours before your surgery.  Have symptoms that get worse.  Have questions or concerns about your surgery.  Summary  Preparing for surgery can make the procedure go more smoothly and lower your risk of  complications.  Before surgery, make a list of questions and concerns to discuss with your surgeon. Ask about the risks and possible complications.  In the days or weeks before your surgery, follow all instructions from your health care provider. You may need to stop smoking, avoid alcohol, follow eating restrictions, and change or stop your regular medicines.  Contact your surgeon if you develop a fever or other signs of illness during the few days before your surgery.  This information is not intended to replace advice given to you by your health care provider. Make sure you discuss any questions you have with your health care provider.  Document Revised: 12/21/2018 Document Reviewed: 10/23/2018  Elsevier Patient Education © 2021 [employer]

## 2023-05-18 ENCOUNTER — TELEPHONE (OUTPATIENT)
Dept: PRIMARY CARE CLINIC | Age: 63
End: 2023-05-18

## 2023-05-18 LAB
QT INTERVAL: 426 MS
QTC INTERVAL: 472 MS

## 2023-05-18 NOTE — TELEPHONE ENCOUNTER
Called and spoke with Lina Velazquez at Dr Corey Peters office. Informed her of Dr Brielle Grimes decision regarding surgery and informed her that patient is followed by Meagan García with [facility] for anemia.

## 2023-05-18 NOTE — TELEPHONE ENCOUNTER
Verena Polanco with Dr [facility] office called to report patient is having carpal tunnel release surgery on May 24, 2023. Clearance has already been obtained from Dr Sharda Michaud, but patient's prework labs showed a hemoglobin of 6.7. One month ago her hemoglobin was 7.4 and 4 months prior it was 7.2. Dr Deya Lopez wants to know if Dr Sharda Michaud is still ok with proceeding with the carpal tunnel release surgery to be performed with local with MAC anesthesia? He also wants to know if patient's anemia is being managed and by whom. Please advise.     Phone number for call back is [phone] ext [phone]

## 2023-05-19 ENCOUNTER — DOCUMENTATION (OUTPATIENT)
Dept: ENDOCRINOLOGY | Facility: CLINIC | Age: 63
End: 2023-05-19
Payer: MEDICARE

## 2023-05-19 ENCOUNTER — TELEPHONE (OUTPATIENT)
Dept: INFUSION THERAPY | Age: 63
End: 2023-05-19

## 2023-05-19 NOTE — TELEPHONE ENCOUNTER
Honorio Simmons from Dr. Ernesto Romero office called requesting approval for PT to have Carpal Tunnel Release surgery on 05/24/23. Per Francine [facility] it is ok from a hematology standpoint for the PT to have the procedure.

## 2023-05-23 DIAGNOSIS — N18.5 ANEMIA, CHRONIC RENAL FAILURE, STAGE 5 (HCC): ICD-10-CM

## 2023-05-23 DIAGNOSIS — D63.1 ANEMIA, CHRONIC RENAL FAILURE, STAGE 5 (HCC): ICD-10-CM

## 2023-05-23 LAB
HEMOCCULT SP1 STL QL: NORMAL
HEMOCCULT SP2 STL QL: NORMAL
HEMOCCULT SP3 STL QL: NORMAL

## 2023-05-24 ENCOUNTER — ANESTHESIA (OUTPATIENT)
Dept: PERIOP | Facility: HOSPITAL | Age: 63
End: 2023-05-24
Payer: MEDICARE

## 2023-05-24 ENCOUNTER — ANESTHESIA EVENT (OUTPATIENT)
Dept: PERIOP | Facility: HOSPITAL | Age: 63
End: 2023-05-24
Payer: MEDICARE

## 2023-05-24 ENCOUNTER — HOSPITAL ENCOUNTER (OUTPATIENT)
Facility: HOSPITAL | Age: 63
Setting detail: HOSPITAL OUTPATIENT SURGERY
Discharge: HOME | End: 2023-05-24
Attending: ORTHOPAEDIC SURGERY | Admitting: ORTHOPAEDIC SURGERY
Payer: MEDICARE

## 2023-05-24 ENCOUNTER — DOCUMENTATION (OUTPATIENT)
Dept: ENDOCRINOLOGY | Facility: CLINIC | Age: 63
End: 2023-05-24
Payer: MEDICARE

## 2023-05-24 VITALS
TEMPERATURE: 96.8 F | DIASTOLIC BLOOD PRESSURE: 40 MMHG | HEART RATE: 75 BPM | SYSTOLIC BLOOD PRESSURE: 116 MMHG | RESPIRATION RATE: 16 BRPM | OXYGEN SATURATION: 98 %

## 2023-05-24 PROBLEM — G56.02 LEFT CARPAL TUNNEL SYNDROME: Status: ACTIVE | Noted: 2023-05-24

## 2023-05-24 LAB
ANION GAP SERPL CALCULATED.3IONS-SCNC: 16 MMOL/L (ref 5–15)
BUN SERPL-MCNC: 40 MG/DL (ref 8–23)
BUN/CREAT SERPL: 6.9 (ref 7–25)
CALCIUM SPEC-SCNC: 9.8 MG/DL (ref 8.6–10.5)
CHLORIDE SERPL-SCNC: 91 MMOL/L (ref 98–107)
CO2 SERPL-SCNC: 30 MMOL/L (ref 22–29)
CREAT SERPL-MCNC: 5.82 MG/DL (ref 0.57–1)
EGFRCR SERPLBLD CKD-EPI 2021: 7.7 ML/MIN/1.73
GLUCOSE BLDC GLUCOMTR-MCNC: 78 MG/DL (ref 70–130)
GLUCOSE BLDC GLUCOMTR-MCNC: 92 MG/DL (ref 70–130)
GLUCOSE SERPL-MCNC: 79 MG/DL (ref 65–99)
POTASSIUM SERPL-SCNC: 3.6 MMOL/L (ref 3.5–5.2)
SODIUM SERPL-SCNC: 137 MMOL/L (ref 136–145)

## 2023-05-24 PROCEDURE — 25010000002 FENTANYL CITRATE (PF) 100 MCG/2ML SOLUTION: Performed by: NURSE ANESTHETIST, CERTIFIED REGISTERED

## 2023-05-24 PROCEDURE — 82948 REAGENT STRIP/BLOOD GLUCOSE: CPT

## 2023-05-24 PROCEDURE — 25010000002 CEFAZOLIN PER 500 MG: Performed by: NURSE ANESTHETIST, CERTIFIED REGISTERED

## 2023-05-24 PROCEDURE — 25010000002 PROPOFOL 200 MG/20ML EMULSION: Performed by: NURSE ANESTHETIST, CERTIFIED REGISTERED

## 2023-05-24 PROCEDURE — 80048 BASIC METABOLIC PNL TOTAL CA: CPT | Performed by: ORTHOPAEDIC SURGERY

## 2023-05-24 RX ORDER — LIDOCAINE HYDROCHLORIDE AND EPINEPHRINE 10; 10 MG/ML; UG/ML
INJECTION, SOLUTION INFILTRATION; PERINEURAL AS NEEDED
Status: DISCONTINUED | OUTPATIENT
Start: 2023-05-24 | End: 2023-05-24 | Stop reason: HOSPADM

## 2023-05-24 RX ORDER — DROPERIDOL 2.5 MG/ML
0.62 INJECTION, SOLUTION INTRAMUSCULAR; INTRAVENOUS ONCE AS NEEDED
Status: DISCONTINUED | OUTPATIENT
Start: 2023-05-24 | End: 2023-05-24 | Stop reason: HOSPADM

## 2023-05-24 RX ORDER — LIDOCAINE HYDROCHLORIDE 10 MG/ML
0.5 INJECTION, SOLUTION EPIDURAL; INFILTRATION; INTRACAUDAL; PERINEURAL ONCE AS NEEDED
Status: DISCONTINUED | OUTPATIENT
Start: 2023-05-24 | End: 2023-05-24 | Stop reason: HOSPADM

## 2023-05-24 RX ORDER — ONDANSETRON 2 MG/ML
4 INJECTION INTRAMUSCULAR; INTRAVENOUS ONCE AS NEEDED
Status: DISCONTINUED | OUTPATIENT
Start: 2023-05-24 | End: 2023-05-24 | Stop reason: HOSPADM

## 2023-05-24 RX ORDER — SODIUM CHLORIDE 0.9 % (FLUSH) 0.9 %
10 SYRINGE (ML) INJECTION AS NEEDED
Status: DISCONTINUED | OUTPATIENT
Start: 2023-05-24 | End: 2023-05-24 | Stop reason: HOSPADM

## 2023-05-24 RX ORDER — CEFAZOLIN SODIUM IN 0.9 % NACL 3 G/100 ML
3 INTRAVENOUS SOLUTION, PIGGYBACK (ML) INTRAVENOUS ONCE
Status: DISCONTINUED | OUTPATIENT
Start: 2023-05-24 | End: 2023-05-24 | Stop reason: HOSPADM

## 2023-05-24 RX ORDER — SODIUM CHLORIDE 0.9 % (FLUSH) 0.9 %
10 SYRINGE (ML) INJECTION EVERY 12 HOURS SCHEDULED
Status: DISCONTINUED | OUTPATIENT
Start: 2023-05-24 | End: 2023-05-24 | Stop reason: HOSPADM

## 2023-05-24 RX ORDER — FLUMAZENIL 0.1 MG/ML
0.2 INJECTION INTRAVENOUS AS NEEDED
Status: DISCONTINUED | OUTPATIENT
Start: 2023-05-24 | End: 2023-05-24 | Stop reason: HOSPADM

## 2023-05-24 RX ORDER — FENTANYL CITRATE 50 UG/ML
25 INJECTION, SOLUTION INTRAMUSCULAR; INTRAVENOUS
Status: DISCONTINUED | OUTPATIENT
Start: 2023-05-24 | End: 2023-05-24 | Stop reason: HOSPADM

## 2023-05-24 RX ORDER — SODIUM CHLORIDE 9 MG/ML
500 INJECTION, SOLUTION INTRAVENOUS CONTINUOUS
Status: DISCONTINUED | OUTPATIENT
Start: 2023-05-24 | End: 2023-05-24 | Stop reason: HOSPADM

## 2023-05-24 RX ORDER — SODIUM CHLORIDE 9 MG/ML
40 INJECTION, SOLUTION INTRAVENOUS AS NEEDED
Status: DISCONTINUED | OUTPATIENT
Start: 2023-05-24 | End: 2023-05-24 | Stop reason: HOSPADM

## 2023-05-24 RX ORDER — NALOXONE HCL 0.4 MG/ML
0.4 VIAL (ML) INJECTION AS NEEDED
Status: DISCONTINUED | OUTPATIENT
Start: 2023-05-24 | End: 2023-05-24 | Stop reason: HOSPADM

## 2023-05-24 RX ORDER — SODIUM CHLORIDE 0.9 % (FLUSH) 0.9 %
3 SYRINGE (ML) INJECTION EVERY 12 HOURS SCHEDULED
Status: DISCONTINUED | OUTPATIENT
Start: 2023-05-24 | End: 2023-05-24 | Stop reason: HOSPADM

## 2023-05-24 RX ORDER — SODIUM CHLORIDE, SODIUM LACTATE, POTASSIUM CHLORIDE, CALCIUM CHLORIDE 600; 310; 30; 20 MG/100ML; MG/100ML; MG/100ML; MG/100ML
100 INJECTION, SOLUTION INTRAVENOUS CONTINUOUS
Status: DISCONTINUED | OUTPATIENT
Start: 2023-05-24 | End: 2023-05-24 | Stop reason: HOSPADM

## 2023-05-24 RX ORDER — SODIUM CHLORIDE, SODIUM LACTATE, POTASSIUM CHLORIDE, CALCIUM CHLORIDE 600; 310; 30; 20 MG/100ML; MG/100ML; MG/100ML; MG/100ML
100 INJECTION, SOLUTION INTRAVENOUS CONTINUOUS PRN
Status: DISCONTINUED | OUTPATIENT
Start: 2023-05-24 | End: 2023-05-24

## 2023-05-24 RX ORDER — SODIUM CHLORIDE 0.9 % (FLUSH) 0.9 %
3-10 SYRINGE (ML) INJECTION AS NEEDED
Status: DISCONTINUED | OUTPATIENT
Start: 2023-05-24 | End: 2023-05-24 | Stop reason: HOSPADM

## 2023-05-24 RX ORDER — IBUPROFEN 600 MG/1
600 TABLET ORAL ONCE AS NEEDED
Status: DISCONTINUED | OUTPATIENT
Start: 2023-05-24 | End: 2023-05-24 | Stop reason: HOSPADM

## 2023-05-24 RX ORDER — ACETAMINOPHEN 500 MG
1000 TABLET ORAL ONCE
Status: COMPLETED | OUTPATIENT
Start: 2023-05-24 | End: 2023-05-24

## 2023-05-24 RX ORDER — EPOETIN ALFA-EPBX 20000 [IU]/ML
20000 INJECTION, SOLUTION INTRAVENOUS; SUBCUTANEOUS 3 TIMES WEEKLY
COMMUNITY

## 2023-05-24 RX ORDER — CEFAZOLIN SODIUM 1 G/3ML
INJECTION, POWDER, FOR SOLUTION INTRAMUSCULAR; INTRAVENOUS AS NEEDED
Status: DISCONTINUED | OUTPATIENT
Start: 2023-05-24 | End: 2023-05-24 | Stop reason: SURG

## 2023-05-24 RX ORDER — FENTANYL CITRATE 50 UG/ML
INJECTION, SOLUTION INTRAMUSCULAR; INTRAVENOUS AS NEEDED
Status: DISCONTINUED | OUTPATIENT
Start: 2023-05-24 | End: 2023-05-24 | Stop reason: SURG

## 2023-05-24 RX ORDER — LABETALOL HYDROCHLORIDE 5 MG/ML
5 INJECTION, SOLUTION INTRAVENOUS
Status: DISCONTINUED | OUTPATIENT
Start: 2023-05-24 | End: 2023-05-24 | Stop reason: HOSPADM

## 2023-05-24 RX ORDER — OXYCODONE AND ACETAMINOPHEN 7.5; 325 MG/1; MG/1
2 TABLET ORAL EVERY 4 HOURS PRN
Status: DISCONTINUED | OUTPATIENT
Start: 2023-05-24 | End: 2023-05-24 | Stop reason: HOSPADM

## 2023-05-24 RX ORDER — OXYCODONE AND ACETAMINOPHEN 10; 325 MG/1; MG/1
1 TABLET ORAL ONCE AS NEEDED
Status: DISCONTINUED | OUTPATIENT
Start: 2023-05-24 | End: 2023-05-24 | Stop reason: HOSPADM

## 2023-05-24 RX ORDER — PROPOFOL 10 MG/ML
INJECTION, EMULSION INTRAVENOUS AS NEEDED
Status: DISCONTINUED | OUTPATIENT
Start: 2023-05-24 | End: 2023-05-24 | Stop reason: SURG

## 2023-05-24 RX ORDER — SODIUM CHLORIDE 0.9 % (FLUSH) 0.9 %
3 SYRINGE (ML) INJECTION AS NEEDED
Status: DISCONTINUED | OUTPATIENT
Start: 2023-05-24 | End: 2023-05-24 | Stop reason: HOSPADM

## 2023-05-24 RX ADMIN — PROPOFOL 450 MG: 10 INJECTION, EMULSION INTRAVENOUS at 09:41

## 2023-05-24 RX ADMIN — ACETAMINOPHEN 1000 MG: 500 TABLET, FILM COATED ORAL at 08:34

## 2023-05-24 RX ADMIN — FENTANYL CITRATE 100 MCG: 50 INJECTION, SOLUTION INTRAMUSCULAR; INTRAVENOUS at 09:41

## 2023-05-24 RX ADMIN — CEFAZOLIN 2 G: 330 INJECTION, POWDER, FOR SOLUTION INTRAMUSCULAR; INTRAVENOUS at 09:47

## 2023-05-24 RX ADMIN — SODIUM CHLORIDE 500 ML: 9 INJECTION, SOLUTION INTRAVENOUS at 07:51

## 2023-05-24 NOTE — DISCHARGE INSTRUCTIONS
1.  Elevate operative upper extremity.  Encourage gentle finger range of motion.  2.  No lifting greater than 5-10 pounds with the operative hand until follow-up.  3.  May remove dressing on postoperative day 3 and begin gentle hand hygiene.  Refrain from soaking the wound in water or applying any lotions or ointments.  4.  Pain medication as prescribed.  No additional Tylenol, alcohol or driving while on opioid pain medication.  Wean off pain medication as able.  5.  Return to clinic in 2 weeks for wound check and likely suture removal.  6.  Call our clinic number in the interim with any questions or concerns.

## 2023-05-24 NOTE — ANESTHESIA PREPROCEDURE EVALUATION
Anesthesia Evaluation     Patient summary reviewed   no history of anesthetic complications:   NPO Solid Status: > 8 hours             Airway   Mallampati: II  Dental      Pulmonary    (+) ,sleep apnea on CPAP  Cardiovascular   Exercise tolerance: poor (<4 METS)    (+) hypertensionCHF , DVT  (-) past MI, cardiac stents, CABG      Neuro/Psych- negative ROS  GI/Hepatic/Renal/Endo    (+) morbid obesity, renal disease ESRD and dialysis, diabetes mellitus (insulin pump) using insulin, thyroid problem hypothyroidism    Musculoskeletal     Abdominal   (+) obese   Substance History      OB/GYN          Other   arthritis, blood dyscrasia anemia,                     Anesthesia Plan    ASA 4     MAC     (Home dialysis yesterday; no issues. High risk of PPC given [place], [place].  Discussed increased chance of need for airway management )  intravenous induction     Anesthetic plan, risks, benefits, and alternatives have been provided, discussed and informed consent has been obtained with: patient.      CODE STATUS:

## 2023-05-24 NOTE — ANESTHESIA POSTPROCEDURE EVALUATION
Patient: Maria C Shrestha    Procedure Summary       Date: 05/24/23 Room / Location:  [facility] OR 08 /  [facility] OR    Anesthesia Start: 0936 Anesthesia Stop:     Procedure: LEFT OPEN CARPAL TUNNEL RELEASE (Left: Wrist) Diagnosis: (G56.02)    Surgeons: Juno Ledbetter MD Provider: Ole Lemon CRNA    Anesthesia Type: MAC ASA Status: 4            Anesthesia Type: MAC    Vitals  Vitals Value Taken Time   /47 05/24/23 1031   Temp 96.8 °F (36 °C) 05/24/23 1025   Pulse 80 05/24/23 1036   Resp 16 05/24/23 1025   SpO2 96 % 05/24/23 1036   Vitals shown include unvalidated device data.        Post Anesthesia Care and Evaluation    Patient location during evaluation: PACU  Patient participation: complete - patient participated  Level of consciousness: awake and alert  Pain management: adequate    Airway patency: patent  Anesthetic complications: No anesthetic complications    Cardiovascular status: acceptable  Respiratory status: acceptable  Hydration status: acceptable    Comments: Blood pressure 108/43, pulse 80, temperature 96.8 °F (36 °C), temperature source Temporal, resp. rate 16, SpO2 95 %, not currently breastfeeding.    Pt discharged from PACU based on stalin score >8

## 2023-05-24 NOTE — OP NOTE
Patient Name: Messi  : 1960  MRN: [id number]      DATE of SURGERY: 2023    SURGEON: Juno Ledbetter MD    ASSISTANT: NONE    PREOPERATIVE DIAGNOSIS    Left carpal tunnel syndrome.       POSTOPERATIVE DIAGNOSIS    Left carpal tunnel syndrome.       PROCEDURE PERFORMED    Left carpal tunnel release-identification of superficial course of the motor recurrent branch of the median nerve was identified.       IMPLANTS  None.       ANESTHESIA    Monitored anesthesia care with local anesthetic      OPERATIVE INDICATIONS    The patient is a 63 y.o. female who presented to my clinic with complaints of numbness and tingling in the hand with clinical exam and neurodiagnostic evidence of carpal tunnel syndrome.  The patient wished to proceed with surgery, understanding the risks, benefits, and alternatives.  Conservative treatment failed to improve symptoms.  The risks include, but are not limited to, that of anesthesia, bleeding, infection, pain, damage to the motor and sensory branch of the median nerve, chronic pillar pain, incomplete resolution of symptoms.       ESTIMATED BLOOD LOSS    Less than 5 mL.       SPECIMENS    None.       DRAINS  None.       COMPLICATIONS    None.       PROCEDURE IN DETAIL    The patient was met in the preoperative holding room where the correct patient, procedure and side were confirmed.  The operative site was marked by myself with the patient's agreement.  The consent was signed by myself.  Patient was transferred to the operating room, and a formal timeout was performed identifying the correct patient and the operative site.  A sterile prep and drape was performed.       A skin marker was used to delineate an approximately 1.5 cm incision in line with the radial aspect of the fourth ray beginning proximal to Nunez's cardinal line and ending distal to the wrist flexion crease.  Prior to exsanguination, a local block was administered with 1% lidocaine with epinephrine.  Approximately 3cc were injected at the wrist flexion crease creating a subcutaneous wheal and the remaining 7 cc injected in line with the proposed incision. The operative extremity was then exsanguinated with an Esmarch which was then used as a tourniquet-she has had a recent fistula to the left upper extremity, therefore, a tourniquet was not used.  A 15 blade scalpel was used to make a longitudinal incision only through the skin. Soft tissue was dissected in line with the incision through the palmar fascia.  She had an atypical amount of musculature overlying the transverse carpal ligament.  Within the musculature, the motor recurrent branch of the median nerve was identified superficial to the transverse carpal ligament along the radial aspect of the incision.  This was protected throughout the procedure.  The transverse carpal ligament was identified and incised until the carpal tunnel contents were identified.  A distal release was performed first utilizing deep knife and blunt tipped scissor dissection until the palmar fat pad was identified. The proximal portion of the transverse carpal ligament was released in a similar fashion. Finally, a pair of blunt-tipped scissors was inserted with the points directed toward the superficial and ulnar aspect of the wrist to protect the palmar cutaneous branch of the median nerve and the distal forearm fascia was incised.  The release was then inspected both visually and on palpation with no additional constriction points appreciated.  The median nerve was inspected and found to be intact with no significant hypertrophic tenosynovium or soft tissue masses noted.  The recurrent motor branch was reinspected and found to be intact.  The Esmarch was removed and hemostasis was obtained with bipolar electrocautery.  The incision was irrigated and the skin closed with 4-0 nylon sutures.  A soft dressing was placed, the patient was awakened from anesthesia and transported to the  recovery room in stable condition. All counts at the end of the procedure were correct. Patient tolerated the procedure well and was without complication.      POSTOPERATIVE PLAN  Discharge home, return to clinic in 2 weeks for suture removal and activity as tolerated. No lifting heavier than 5 pounds for 2 weeks.

## 2023-05-26 ENCOUNTER — OFFICE VISIT (OUTPATIENT)
Dept: PULMONOLOGY | Age: 63
End: 2023-05-26
Payer: MEDICARE

## 2023-05-26 VITALS
HEART RATE: 77 BPM | DIASTOLIC BLOOD PRESSURE: 66 MMHG | WEIGHT: 293 LBS | OXYGEN SATURATION: 99 % | HEIGHT: 65 IN | BODY MASS INDEX: 48.82 KG/M2 | SYSTOLIC BLOOD PRESSURE: 115 MMHG | TEMPERATURE: 97.6 F

## 2023-05-26 DIAGNOSIS — G47.33 OSA ON CPAP: Primary | ICD-10-CM

## 2023-05-26 DIAGNOSIS — Z99.89 OSA ON CPAP: Primary | ICD-10-CM

## 2023-05-26 DIAGNOSIS — E66.01 MORBID OBESITY (HCC): ICD-10-CM

## 2023-05-26 DIAGNOSIS — G47.8 NON-RESTORATIVE SLEEP: ICD-10-CM

## 2023-05-26 DIAGNOSIS — G47.10 HYPERSOMNIA: ICD-10-CM

## 2023-05-26 PROCEDURE — 3078F DIAST BP <80 MM HG: CPT | Performed by: INTERNAL MEDICINE

## 2023-05-26 PROCEDURE — 99214 OFFICE O/P EST MOD 30 MIN: CPT | Performed by: INTERNAL MEDICINE

## 2023-05-26 PROCEDURE — 3074F SYST BP LT 130 MM HG: CPT | Performed by: INTERNAL MEDICINE

## 2023-05-26 ASSESSMENT — ENCOUNTER SYMPTOMS
SHORTNESS OF BREATH: 0
COUGH: 0
ABDOMINAL PAIN: 0
CHEST TIGHTNESS: 0
RHINORRHEA: 0
ABDOMINAL DISTENTION: 0
ANAL BLEEDING: 0
APNEA: 1
WHEEZING: 0
BACK PAIN: 0

## 2023-05-26 NOTE — PROGRESS NOTES
Pulmonary and Sleep Medicine    Frank Johnson (:  1960) is a 61 y.o. female,Established patient, here for evaluation of the following chief complaint(s):  Follow-up (Follow up- Sleep Study )      Referring physician:  No referring provider defined for this encounter. ASSESSMENT/PLAN:  1. GERI on CPAP  2. Morbid obesity ([address]  3. Non-restorative sleep  4. Hypersomnia        She is scheduled for CPAP titration. Will see her after the titration. Kyra White MD, [facility], [facility]    Return in about 9 weeks (around 2023). SUBJECTIVE/OBJECTIVE:  She is here for follow up on GERI. She had a sleep study and did show sever GERI with hypoxia. Did not have enough time for CPAP titration. Prior to Visit Medications    Medication Sig Taking?  Authorizing Provider   traZODone (DESYREL) 100 MG tablet Take 1 tablet by mouth nightly Yes Kanwal Jackson MD   carBAMazepine (TEGRETOL-XR) 200 MG extended release tablet Take 1 tablet by mouth 2 times daily Yes Deepali Gilliland MD   citalopram (CELEXA) 40 MG tablet Take 1 tablet by mouth daily Yes JAMES Cuellar - CNP   docusate sodium (COLACE) 100 MG capsule Take 1 capsule by mouth 2 times daily Yes Pauline Paz MD   Semaglutide,0.25 or 0.5MG/DOS, (OZEMPIC, 0.25 OR 0.5 MG/DOSE,) 2 MG/1.5ML SOPN Inject 2 mg into the skin once a week Yes Historical Provider, MD   Sucroferric Oxyhydroxide (VELPHORO) 500 MG CHEW Take 2 tablets by mouth 3 times daily (before meals) Yes Historical Provider, MD   ondansetron (ZOFRAN ODT) 4 MG disintegrating tablet Take 1 tablet by mouth every 8 hours as needed for Nausea or Vomiting Yes Sylvia Segovia MD   Insulin Regular Human (HUMULIN R U-500 KWIKPEN SC) Inject into the skin OMNI POD 1 UNIT PER HOUR AND BOLUS 25 UNITS Q AM, 15 UNITS AT HS Yes Historical Provider, MD   potassium chloride (KLOR-CON) 20 MEQ packet Take 20 mEq by mouth daily Yes Historical Provider, MD   vitamin C (ASCORBIC ACID) 500

## 2023-06-05 ASSESSMENT — ENCOUNTER SYMPTOMS
TROUBLE SWALLOWING: 0
APNEA: 0
COUGH: 0
ABDOMINAL DISTENTION: 0
SINUS PAIN: 0
DIARRHEA: 0
SHORTNESS OF BREATH: 1
BLOOD IN STOOL: 0
NAUSEA: 0
RHINORRHEA: 0
VOMITING: 0
CHEST TIGHTNESS: 0
BACK PAIN: 0
CONSTIPATION: 0
ABDOMINAL PAIN: 0
SORE THROAT: 0
WHEEZING: 0

## 2023-06-06 ENCOUNTER — OFFICE VISIT (OUTPATIENT)
Dept: PRIMARY CARE CLINIC | Age: 63
End: 2023-06-06
Payer: MEDICARE

## 2023-06-06 VITALS
HEIGHT: 65 IN | DIASTOLIC BLOOD PRESSURE: 74 MMHG | TEMPERATURE: 97.2 F | SYSTOLIC BLOOD PRESSURE: 126 MMHG | HEART RATE: 72 BPM | RESPIRATION RATE: 20 BRPM | BODY MASS INDEX: 48.82 KG/M2 | OXYGEN SATURATION: 99 % | WEIGHT: 293 LBS

## 2023-06-06 DIAGNOSIS — E07.9 THYROID DISEASE: ICD-10-CM

## 2023-06-06 DIAGNOSIS — Z99.2 ESRD ON DIALYSIS (HCC): ICD-10-CM

## 2023-06-06 DIAGNOSIS — E11.69 MIXED DIABETIC HYPERLIPIDEMIA ASSOCIATED WITH TYPE 2 DIABETES MELLITUS (HCC): Primary | ICD-10-CM

## 2023-06-06 DIAGNOSIS — Z99.89 OSA ON CPAP: ICD-10-CM

## 2023-06-06 DIAGNOSIS — F32.1 MODERATE MAJOR DEPRESSION (HCC): ICD-10-CM

## 2023-06-06 DIAGNOSIS — E53.8 B12 DEFICIENCY: ICD-10-CM

## 2023-06-06 DIAGNOSIS — N18.6 ESRD ON DIALYSIS (HCC): ICD-10-CM

## 2023-06-06 DIAGNOSIS — G47.33 OSA ON CPAP: ICD-10-CM

## 2023-06-06 DIAGNOSIS — E78.2 MIXED DIABETIC HYPERLIPIDEMIA ASSOCIATED WITH TYPE 2 DIABETES MELLITUS (HCC): Primary | ICD-10-CM

## 2023-06-06 DIAGNOSIS — F41.9 ANXIETY: ICD-10-CM

## 2023-06-06 DIAGNOSIS — I10 PRIMARY HYPERTENSION: ICD-10-CM

## 2023-06-06 PROCEDURE — 3078F DIAST BP <80 MM HG: CPT | Performed by: INTERNAL MEDICINE

## 2023-06-06 PROCEDURE — 3074F SYST BP LT 130 MM HG: CPT | Performed by: INTERNAL MEDICINE

## 2023-06-06 PROCEDURE — 99214 OFFICE O/P EST MOD 30 MIN: CPT | Performed by: INTERNAL MEDICINE

## 2023-06-06 PROCEDURE — 96372 THER/PROPH/DIAG INJ SC/IM: CPT | Performed by: INTERNAL MEDICINE

## 2023-06-06 RX ORDER — LEVOTHYROXINE SODIUM 112 UG/1
TABLET ORAL
COMMUNITY
Start: 2023-04-27

## 2023-06-06 RX ORDER — CYANOCOBALAMIN 1000 UG/ML
1000 INJECTION, SOLUTION INTRAMUSCULAR; SUBCUTANEOUS ONCE
Status: COMPLETED | OUTPATIENT
Start: 2023-06-06 | End: 2023-06-06

## 2023-06-06 RX ORDER — ROSUVASTATIN CALCIUM 20 MG/1
20 TABLET, COATED ORAL DAILY
Qty: 90 TABLET | Refills: 1 | Status: SHIPPED | OUTPATIENT
Start: 2023-06-06 | End: 2023-12-03

## 2023-06-06 RX ADMIN — CYANOCOBALAMIN 1000 MCG: 1000 INJECTION, SOLUTION INTRAMUSCULAR; SUBCUTANEOUS at 11:22

## 2023-06-06 NOTE — PROGRESS NOTES
(HUMULIN R U-500 KWIKPEN SC) Inject into the skin OMNI POD 1 UNIT PER HOUR AND BOLUS 25 UNITS Q AM, 15 UNITS AT HS Yes Historical Provider, MD   potassium chloride (KLOR-CON) 20 MEQ packet Take 20 mEq by mouth daily Yes Historical Provider, MD   vitamin C (ASCORBIC ACID) 500 MG tablet Take 1 tablet by mouth 2 times daily Yes Historical Provider, MD   carvedilol (COREG) 6.25 MG tablet Take 1 tablet by mouth 2 times daily (with meals) Yes Historical Provider, MD   calcitRIOL (ROCALTROL) 0.5 MCG capsule Take 1 capsule by mouth daily Yes Historical Provider, MD   Evolocumab (REPATHA SC) Inject into the skin every 14 days Yes Historical Provider, MD   cetirizine (ZYRTEC) 10 MG tablet Take 1 tablet by mouth as needed for Allergies Yes Historical Provider, MD   fluticasone (FLONASE) 50 MCG/ACT nasal spray 2 sprays by Nasal route daily as needed for Rhinitis Yes Historical Provider, MD   allopurinol (ZYLOPRIM) 100 MG tablet Take 1 tablet by mouth 2 times daily Yes Historical Provider, MD   aspirin 81 MG tablet Take 1 tablet by mouth daily With Dinner Yes Historical Provider, MD Corley (Including outside providers/suppliers regularly involved in providing care):   Patient Care Team:  Sumit Deras MD as PCP - General (Internal Medicine)  Sumit Deras MD as PCP - Empaneled Provider     Reviewed and updated this visit:  Allergies  Meds          PREOP note;  Patient has moderate clinical predictors for a low Procedure risk. Medicall stable for planned Bilateral carpal tunnel release under local Mac/ anaesthesia    Sumit Deras MD

## 2023-06-07 ENCOUNTER — TELEPHONE (OUTPATIENT)
Dept: GASTROENTEROLOGY | Facility: CLINIC | Age: 63
End: 2023-06-07
Payer: MEDICARE

## 2023-06-07 ENCOUNTER — ANESTHESIA (OUTPATIENT)
Dept: GASTROENTEROLOGY | Facility: HOSPITAL | Age: 63
End: 2023-06-07
Payer: MEDICARE

## 2023-06-07 ENCOUNTER — HOSPITAL ENCOUNTER (OUTPATIENT)
Facility: HOSPITAL | Age: 63
Setting detail: HOSPITAL OUTPATIENT SURGERY
Discharge: HOME | End: 2023-06-07
Attending: INTERNAL MEDICINE | Admitting: INTERNAL MEDICINE
Payer: MEDICARE

## 2023-06-07 ENCOUNTER — ANESTHESIA EVENT (OUTPATIENT)
Dept: GASTROENTEROLOGY | Facility: HOSPITAL | Age: 63
End: 2023-06-07
Payer: MEDICARE

## 2023-06-07 VITALS
HEART RATE: 78 BPM | HEIGHT: 65 IN | BODY MASS INDEX: 48.82 KG/M2 | RESPIRATION RATE: 16 BRPM | DIASTOLIC BLOOD PRESSURE: 40 MMHG | TEMPERATURE: 96.6 F | WEIGHT: 293 LBS | OXYGEN SATURATION: 98 % | SYSTOLIC BLOOD PRESSURE: 118 MMHG

## 2023-06-07 LAB — GLUCOSE BLDC GLUCOMTR-MCNC: 92 MG/DL (ref 70–130)

## 2023-06-07 PROCEDURE — 82948 REAGENT STRIP/BLOOD GLUCOSE: CPT

## 2023-06-07 PROCEDURE — 25010000002 PROPOFOL 10 MG/ML EMULSION: Performed by: NURSE ANESTHETIST, CERTIFIED REGISTERED

## 2023-06-07 PROCEDURE — 43235 EGD DIAGNOSTIC BRUSH WASH: CPT | Performed by: INTERNAL MEDICINE

## 2023-06-07 RX ORDER — PROPOFOL 10 MG/ML
VIAL (ML) INTRAVENOUS AS NEEDED
Status: DISCONTINUED | OUTPATIENT
Start: 2023-06-07 | End: 2023-06-07 | Stop reason: SURG

## 2023-06-07 RX ORDER — LIDOCAINE HYDROCHLORIDE 10 MG/ML
0.5 INJECTION, SOLUTION EPIDURAL; INFILTRATION; INTRACAUDAL; PERINEURAL ONCE AS NEEDED
Status: DISCONTINUED | OUTPATIENT
Start: 2023-06-07 | End: 2023-06-07 | Stop reason: SDUPTHER

## 2023-06-07 RX ORDER — ONDANSETRON 2 MG/ML
4 INJECTION INTRAMUSCULAR; INTRAVENOUS ONCE AS NEEDED
Status: DISCONTINUED | OUTPATIENT
Start: 2023-06-07 | End: 2023-06-07 | Stop reason: HOSPADM

## 2023-06-07 RX ORDER — SODIUM CHLORIDE 9 MG/ML
500 INJECTION, SOLUTION INTRAVENOUS CONTINUOUS PRN
Status: DISCONTINUED | OUTPATIENT
Start: 2023-06-07 | End: 2023-06-07 | Stop reason: HOSPADM

## 2023-06-07 RX ORDER — SODIUM CHLORIDE 9 MG/ML
1000 INJECTION, SOLUTION INTRAVENOUS CONTINUOUS
Status: DISCONTINUED | OUTPATIENT
Start: 2023-06-07 | End: 2023-06-07 | Stop reason: HOSPADM

## 2023-06-07 RX ORDER — SODIUM CHLORIDE 0.9 % (FLUSH) 0.9 %
10 SYRINGE (ML) INJECTION AS NEEDED
Status: DISCONTINUED | OUTPATIENT
Start: 2023-06-07 | End: 2023-06-07 | Stop reason: HOSPADM

## 2023-06-07 RX ORDER — LIDOCAINE HYDROCHLORIDE 20 MG/ML
INJECTION, SOLUTION EPIDURAL; INFILTRATION; INTRACAUDAL; PERINEURAL AS NEEDED
Status: DISCONTINUED | OUTPATIENT
Start: 2023-06-07 | End: 2023-06-07 | Stop reason: SURG

## 2023-06-07 RX ORDER — LIDOCAINE HYDROCHLORIDE 10 MG/ML
0.5 INJECTION, SOLUTION EPIDURAL; INFILTRATION; INTRACAUDAL; PERINEURAL ONCE AS NEEDED
Status: DISCONTINUED | OUTPATIENT
Start: 2023-06-07 | End: 2023-06-07 | Stop reason: HOSPADM

## 2023-06-07 RX ADMIN — PROPOFOL INJECTABLE EMULSION 200 MG: 10 INJECTION, EMULSION INTRAVENOUS at 09:06

## 2023-06-07 RX ADMIN — GLYCOPYRROLATE 0.1 MG: 0.2 INJECTION INTRAMUSCULAR; INTRAVENOUS at 09:02

## 2023-06-07 RX ADMIN — SODIUM CHLORIDE 500 ML: 9 INJECTION, SOLUTION INTRAVENOUS at 08:56

## 2023-06-07 RX ADMIN — LIDOCAINE HYDROCHLORIDE 200 MG: 20 INJECTION, SOLUTION EPIDURAL; INFILTRATION; INTRACAUDAL; PERINEURAL at 09:06

## 2023-06-07 NOTE — ANESTHESIA POSTPROCEDURE EVALUATION
Patient: Maria C Shrestha    Procedure Summary       Date: 06/07/23 Room / Location: [facility] ENDOSCOPY 4 / [facility] ENDOSCOPY    Anesthesia Start: 0901 Anesthesia Stop: 0918    Procedure: ESOPHAGOGASTRODUODENOSCOPY Diagnosis:       Gastric polyps      (Gastric polyps [K31.7])    Surgeons: Mono Linder MD Provider: Kaylin Bower CRNA    Anesthesia Type: MAC ASA Status: 4            Anesthesia Type: MAC    Vitals  Vitals Value Taken Time   /30 06/07/23 0931   Temp     Pulse 77 06/07/23 0931   Resp 14 06/07/23 0930   SpO2 98 % 06/07/23 0931   Vitals shown include unvalidated device data.        Post Anesthesia Care and Evaluation    Patient location during evaluation: PHASE II  Patient participation: complete - patient participated  Level of consciousness: awake and alert  Pain management: adequate    Airway patency: patent  Anesthetic complications: No anesthetic complications  PONV Status: none  Cardiovascular status: acceptable  Respiratory status: acceptable  Hydration status: acceptable  No anesthesia care post op

## 2023-06-07 NOTE — ANESTHESIA PREPROCEDURE EVALUATION
Anesthesia Evaluation     Patient summary reviewed   no history of anesthetic complications:   NPO Solid Status: > 8 hours             Airway   Mallampati: II  TM distance: >3 FB  Neck ROM: full  Dental      Pulmonary    (+) ,sleep apnea on CPAP  Cardiovascular   Exercise tolerance: poor (<4 METS)    (+) hypertensionCHF , DVT  (-) past MI, cardiac stents, CABG      Neuro/Psych- negative ROS  (-) TIA, CVA    ROS Comment: Trigeminal neuralgia on carbamazepine  GI/Hepatic/Renal/Endo    (+) morbid obesity, renal disease (home dialysis two days ago) ESRD and dialysis, diabetes mellitus (insulin pump) using insulin, thyroid problem hypothyroidism    Musculoskeletal     Abdominal   (+) obese   Substance History      OB/GYN          Other   arthritis, blood dyscrasia anemia,                     Anesthesia Plan    ASA 4     MAC     intravenous induction     Anesthetic plan, risks, benefits, and alternatives have been provided, discussed and informed consent has been obtained with: patient.      CODE STATUS:

## 2023-06-07 NOTE — H&P
[facility] Gastroenterology  Pre Procedure History & Physical    Chief Complaint:   History of adenomatous gastric polyps    Subjective     HPI:   She underwent endoscopy exam in May of last year.  Found to have adenomatous polyps.  Follow-up endoscopy in December revealed 20 polyps that were removed.  Most of these were hyperplastic fundic type polyps but a couple were adenomatous.  She presents for follow-up endoscopy exam now.    Past Medical History:   Past Medical History:   Diagnosis Date    Acquired hypothyroidism 02/06/2017    Allergic     Anemia     Anemia due to stage 4 chronic kidney disease 08/18/2020    Anxiety     Arthritis     Cellulitis     CHF (congestive heart failure)     Chronic kidney disease     3rd stage    Depression     Dialysis patient     Disease of thyroid gland     Dyslipidemia     Elevated cholesterol     Essential hypertension     Fatigue     Gallbladder abscess     Hearing loss     History of transfusion     Light headed     Limited range of motion (ROM) of shoulder     right    Obesity     Palpitation     Short of breath on exertion     Sinusitis     Sleep apnea     Stage 4 chronic kidney disease 09/16/2020    Type 2 diabetes mellitus     Type II diabetes mellitus, uncontrolled     Wears glasses        Past Surgical History:  Past Surgical History:   Procedure Laterality Date    ADENOIDECTOMY      ANAL FISTULA REPAIR      x 2     ARTERIOVENOUS FISTULA Left 08/2021    CARPAL TUNNEL RELEASE Left 5/24/2023    Procedure: LEFT OPEN CARPAL TUNNEL RELEASE;  Surgeon: Juno Ledbetter MD;  Location: [facility] OR;  Service: Orthopedics;  Laterality: Left;    CHOLECYSTECTOMY      COLONOSCOPY  01/02/2014    COLONOSCOPY N/A 03/22/2017    Procedure: COLONOSCOPY WITH ANESTHESIA;  Surgeon: Mono Linder MD;  Location: [facility] ENDOSCOPY;  Service:     COLONOSCOPY N/A 05/09/2022    Procedure: COLONOSCOPY WITH ANESTHESIA;  Surgeon: Mono Linder MD;  Location: [facility] ENDOSCOPY;  Service:  Gastroenterology;  Laterality: N/A;  pre polyp;brbpr  post  Dr. Soliman    D & C HYSTEROSCOPY N/A 07/25/2018    Procedure: DILATATION AND CURETTAGE HYSTEROSCOPY;  Surgeon: Michael Castaneda MD;  Location: [facility] OR;  Service: Obstetrics/Gynecology    DILATATION AND CURETTAGE      X 2    ENDOSCOPY      ENDOSCOPY N/A 05/09/2022    Procedure: ESOPHAGOGASTRODUODENOSCOPY WITH ANESTHESIA;  Surgeon: Mono Linder MD;  Location: [facility] ENDOSCOPY;  Service: Gastroenterology;  Laterality: N/A;  pre screen  post gastric polyps  Dr. Soliman    ENDOSCOPY N/A 11/28/2022    Procedure: ESOPHAGOGASTRODUODENOSCOPY WITH ANESTHESIA;  Surgeon: Mono Linder MD;  Location: [facility] ENDOSCOPY;  Service: Gastroenterology;  Laterality: N/A;  pre: hx gastric polyp  post: Retained food, gastritis  dr sebastián soliman    ENDOSCOPY N/A 12/29/2022    Procedure: ESOPHAGOGASTRODUODENOSCOPY;  Surgeon: Mono Linder MD;  Location: [facility] ENDOSCOPY;  Service: Gastroenterology;  Laterality: N/A;  preop; hx of polyps  postop gastric polyps  PCP Fatmata Wallace MD    HYSTERECTOMY      TONSILLECTOMY          Family History:  Family History   Problem Relation Age of Onset    Diabetes Other     Heart failure Other     Cancer Other     Kidney disease Other     Lung cancer Mother     Heart disease Father     Diabetes Father     Obesity Father     Stroke Father     Prostate cancer Father     Cancer Maternal Grandmother     Uterine cancer Maternal Grandmother     Diabetes Maternal Grandfather     Cancer Paternal Grandmother     Colon cancer Paternal Grandmother     Diabetes Paternal Grandfather     Heart disease Paternal Grandfather     No Known Problems Sister     No Known Problems Brother     No Known Problems Daughter     No Known Problems Maternal Aunt     No Known Problems Paternal Aunt     BRCA 1/2 Neg Hx     Breast cancer Neg Hx     Endometrial cancer Neg Hx     Ovarian cancer Neg Hx        Social History:   reports that she has never  smoked. She has never used smokeless tobacco. She reports that she does not drink alcohol and does not use drugs.    Medications:   Prior to Admission medications    Medication Sig Start Date End Date Taking? Authorizing Provider   allopurinol (ZYLOPRIM) 100 MG tablet Take 1 tablet by mouth Daily. 4/27/23  Yes Wilman Negrete MD   Ascorbic Acid (VITAMIN C PO) Take  by mouth Daily.   Yes Rk Hidalgo MD   aspirin 81 MG tablet Take 1 tablet by mouth Daily. Stop 7/22/2018   Yes Rk Hidalgo MD   buPROPion SR (Wellbutrin SR) 100 MG 12 hr tablet Take 1 tablet by mouth 2 (Two) Times a Day. 9/21/22  Yes Ole Soliman MD   calcitriol (ROCALTROL) 0.5 MCG capsule Take 1 capsule by mouth Daily.   Yes Rk Hidalgo MD   carvedilol (COREG) 6.25 MG tablet Take 1 tablet by mouth. 5/17/22  Yes Rk Hidalgo MD   cetirizine (zyrTEC) 10 MG tablet Take 1 tablet by mouth. 5/10/22  Yes Rk Hidalgo MD   citalopram (CeleXA) 40 MG tablet Take 1 tablet by mouth Daily. 2/27/23  Yes Janet Richardson APRN   diazePAM (Valium) 5 MG tablet Take 1 tablet by mouth As Needed for Anxiety. 10/31/22  Yes Ole Soliman MD   Epoetin Amadeo-epbx (Retacrit) 52663 UNIT/ML injection Inject 1 mL under the skin into the appropriate area as directed 3 (Three) Times a Week.   Yes Rk Hidalgo MD   famotidine (PEPCID) 20 MG tablet Take 2 tablets by mouth 2 (Two) Times a Day. 3/7/23  Yes Lisette Denton APRN   insulin regular (HUMULIN R) 500 UNIT/ML CONCENTRATED injection Inject  under the skin into the appropriate area as directed 3 (Three) Times a Day Before Meals. 6/24/22  Yes Wilman Negrete MD   levothyroxine (Synthroid) 112 MCG tablet Take 1 tablet by mouth Daily. 4/27/23 4/26/24 Yes Wilman Negrete MD   ONDANSETRON PO Take  by mouth As Needed.   Yes ProviderRk MD   POTASSIUM PO Take  by mouth Daily.   Yes Provider, MD Rk   rosuvastatin (CRESTOR) 20  "MG tablet Take 1 tablet by mouth Every Night. 5/2/23  Yes Janet Richardson APRN   sevelamer (RENVELA) 800 MG tablet Take 4 tablets with each meal and 2 tablets with a snack 10/21/22  Yes ProviderRk MD   BD Insulin Syringe U/F 31G X 5/16\" 1 ML misc AS DIRECTED FOUR TIMES A DAY 5/29/21   Wilman Negrete MD   Continuous Blood Gluc Sensor (FreeStyle Filippo 2 Sensor) misc Left arm    ProviderRk MD   Docusate Calcium (STOOL SOFTENER PO) Take  by mouth 2 (Two) Times a Day.    ProviderRk MD   fluticasone (FLONASE) 50 MCG/ACT nasal spray 1 spray into the nostril(s) as directed by provider 2 (Two) Times a Day. 3/22/21   ProviderRk MD   Heparin Sod, Pork, Lock Flush (Heparin Na, Pork, Lock Flsh PF) 1 UNIT/ML solution lock flush 6,000 mL by Intracatheter route Every 8 (Eight) Hours. Administers before every dialysis treatment, which is 4-5 times a week at home    ProviderRk MD   Insulin Disposable Pump (Omnipod 5 G6 Pod, Gen 5,) misc 1 each Every Other Day. 10/12/22   Wilman Negrete MD   Misc. Devices (Bariatric Rollator) misc Please provide device for patient 11/11/22   Gwen Arenas APRN   Repatha solution prefilled syringe injection Inject 1 mL under theskin into the appropriate area as directed Every 14 (Fourteen) Days. 12/22/22   Vikas Brown APRN   Semaglutide, 2 MG/DOSE, (Ozempic, 2 MG/DOSE,) 8 MG/3ML solution pen-injector Inject 2 mg under the skin into the appropriate area as directed 1 (One) Time Per Week. Please cancel Trulicity 1/6/23   Wilman Negrete MD   terbinafine (lamISIL) 1 % cream Apply  topically to the appropriate area as directed 2 (Two) Times a Day. 4/21/23   Sadiq Wong, CARYL   Insulin Regular Human, Conc, (HumuLIN R U-500 KwikPen) 500 UNIT/ML solution pen-injector CONCENTRATED injection 150 units twice daily  Patient taking differently: 150 units twice daily--puts in insulin pump 3/2/22 6/7/23  " "Wilman Negerte MD   Insulin Regular Human, Conc, (HumuLIN R U-500 KwikPen) 500 UNIT/ML solution pen-injector CONCENTRATED injection 250 units qac tid  Patient taking differently: 250 units qac tid--inserts into pump 12/14/22 6/7/23  Wilman Negrete MD       Allergies:  Codeine    ROS:    General: Weight stable  Respiratory: No SOA  Cardiovascular: No CP    Objective     Blood pressure 127/50, pulse 74, temperature 96.6 °F (35.9 °C), temperature source Temporal, resp. rate 18, height 165.1 cm (65\"), weight (!) 142 kg (312 lb), SpO2 100 %, not currently breastfeeding.    Physical Exam   Constitutional: Pt is oriented to person, place, and in no distress.   Cardiovascular: Normal rate, regular rhythm.    Pulmonary/Chest: Effort normal. No respiratory distress.    Abdominal: Nondistended.  Psychiatric: Mood, memory, affect and judgment appear normal.     Assessment & Plan     Diagnosis:  History of gastric adenomatous polyps    Anticipated Surgical Procedure:  Endoscopy    The risks, benefits, and alternatives of this procedure have been discussed with the patient or the responsible party- the patient understands and agrees to proceed.    EMR Dragon/transcription disclaimer:  Much of this encounter note is electronic transcription/translation of spoken language to printed text.  The electronic translation of spoken language may be erroneous, or at times, nonsensical words or phrases may be inadvertently transcribed.  Although I have reviewed the note for such errors, some may still exist.  "

## 2023-07-06 ENCOUNTER — NURSE ONLY (OUTPATIENT)
Dept: PRIMARY CARE CLINIC | Age: 63
End: 2023-07-06
Payer: MEDICARE

## 2023-07-06 DIAGNOSIS — E53.8 B12 DEFICIENCY: Primary | ICD-10-CM

## 2023-07-06 PROCEDURE — 96372 THER/PROPH/DIAG INJ SC/IM: CPT | Performed by: INTERNAL MEDICINE

## 2023-07-06 PROCEDURE — 99999 PR OFFICE/OUTPT VISIT,PROCEDURE ONLY: CPT | Performed by: INTERNAL MEDICINE

## 2023-07-06 RX ORDER — CYANOCOBALAMIN 1000 UG/ML
1000 INJECTION, SOLUTION INTRAMUSCULAR; SUBCUTANEOUS ONCE
Status: COMPLETED | OUTPATIENT
Start: 2023-07-06 | End: 2023-07-06

## 2023-07-06 RX ADMIN — CYANOCOBALAMIN 1000 MCG: 1000 INJECTION, SOLUTION INTRAMUSCULAR; SUBCUTANEOUS at 09:31

## 2023-07-14 ENCOUNTER — TELEPHONE (OUTPATIENT)
Dept: PSYCHIATRY | Age: 63
End: 2023-07-14

## 2023-07-14 NOTE — TELEPHONE ENCOUNTER
Called pt for appointment reminder.   -left voicemail, requesting a return call    Electronically signed by Brayden Courtney MA on 7/14/2023 at 2:01 PM

## 2023-07-17 ENCOUNTER — OFFICE VISIT (OUTPATIENT)
Dept: PSYCHIATRY | Age: 63
End: 2023-07-17
Payer: MEDICARE

## 2023-07-17 ENCOUNTER — HOSPITAL ENCOUNTER (OUTPATIENT)
Dept: SLEEP CENTER | Age: 63
Discharge: HOME | End: 2023-07-19
Payer: MEDICARE

## 2023-07-17 ENCOUNTER — TELEPHONE (OUTPATIENT)
Dept: HEMATOLOGY | Age: 63
End: 2023-07-17

## 2023-07-17 VITALS
HEIGHT: 65 IN | TEMPERATURE: 97.4 F | HEART RATE: 70 BPM | WEIGHT: 293 LBS | BODY MASS INDEX: 48.82 KG/M2 | OXYGEN SATURATION: 97 % | DIASTOLIC BLOOD PRESSURE: 5 MMHG | SYSTOLIC BLOOD PRESSURE: 5 MMHG

## 2023-07-17 DIAGNOSIS — G47.00 INSOMNIA, UNSPECIFIED TYPE: ICD-10-CM

## 2023-07-17 DIAGNOSIS — F33.0 MAJOR DEPRESSIVE DISORDER, RECURRENT, MILD (HCC): Primary | ICD-10-CM

## 2023-07-17 DIAGNOSIS — F41.1 GENERALIZED ANXIETY DISORDER: ICD-10-CM

## 2023-07-17 PROCEDURE — 99214 OFFICE O/P EST MOD 30 MIN: CPT | Performed by: PSYCHIATRY & NEUROLOGY

## 2023-07-17 PROCEDURE — 95811 POLYSOM 6/>YRS CPAP 4/> PARM: CPT

## 2023-07-17 ASSESSMENT — PATIENT HEALTH QUESTIONNAIRE - PHQ9
SUM OF ALL RESPONSES TO PHQ QUESTIONS 1-9: 9
10. IF YOU CHECKED OFF ANY PROBLEMS, HOW DIFFICULT HAVE THESE PROBLEMS MADE IT FOR YOU TO DO YOUR WORK, TAKE CARE OF THINGS AT HOME, OR GET ALONG WITH OTHER PEOPLE: 1
5. POOR APPETITE OR OVEREATING: 1
SUM OF ALL RESPONSES TO PHQ QUESTIONS 1-9: 9
SUM OF ALL RESPONSES TO PHQ QUESTIONS 1-9: 9
SUM OF ALL RESPONSES TO PHQ9 QUESTIONS 1 & 2: 2
2. FEELING DOWN, DEPRESSED OR HOPELESS: 1
9. THOUGHTS THAT YOU WOULD BE BETTER OFF DEAD, OR OF HURTING YOURSELF: 0
SUM OF ALL RESPONSES TO PHQ QUESTIONS 1-9: 9
7. TROUBLE CONCENTRATING ON THINGS, SUCH AS READING THE NEWSPAPER OR WATCHING TELEVISION: 1
3. TROUBLE FALLING OR STAYING ASLEEP: 0
8. MOVING OR SPEAKING SO SLOWLY THAT OTHER PEOPLE COULD HAVE NOTICED. OR THE OPPOSITE, BEING SO FIGETY OR RESTLESS THAT YOU HAVE BEEN MOVING AROUND A LOT MORE THAN USUAL: 1
4. FEELING TIRED OR HAVING LITTLE ENERGY: 2
6. FEELING BAD ABOUT YOURSELF - OR THAT YOU ARE A FAILURE OR HAVE LET YOURSELF OR YOUR FAMILY DOWN: 2
1. LITTLE INTEREST OR PLEASURE IN DOING THINGS: 1

## 2023-07-17 NOTE — PROGRESS NOTES
lumps, breast pain, nipple discharge, hair loss)    Neurologic: (HA, muscle weakness, paresthesias (numbness, coldness, crawling or prickling), memory loss, seizure, dizziness)    Endocrine: (heat or cold intolerance, excessive thirst (polydipsia), excessive hunger (polyphagia))    Immune/Allergic: (hives, seasonal or environmental allergies, HIV exposure)    Hematologic/Lymphatic: (lymph node enlargement, easy bleeding or bruising)    History obtained via chart review and patient    PCP is Alex Patterson MD       Current Meds:    Prior to Admission medications    Medication Sig Start Date End Date Taking?  Authorizing Provider   levothyroxine (SYNTHROID) 112 MCG tablet  4/27/23   Historical Provider, MD   rosuvastatin (CRESTOR) 20 MG tablet Take 1 tablet by mouth daily 6/6/23 12/3/23  Alex Patterson MD   traZODone (DESYREL) 100 MG tablet Take 1 tablet by mouth nightly 4/19/23 7/18/23  Jeremias Almeida MD   carBAMazepine (TEGRETOL-XR) 200 MG extended release tablet Take 1 tablet by mouth 2 times daily  Patient taking differently: Take 1 tablet by mouth 2 times daily 1 tablet every other night 4/6/23   Alex Patterson MD   citalopram (CELEXA) 40 MG tablet Take 1 tablet by mouth daily 3/31/23   Hawa Cleveland, APRN - CNP   docusate sodium (COLACE) 100 MG capsule Take 1 capsule by mouth 2 times daily 10/21/22   Deborah Mccray MD   Semaglutide,0.25 or 0.5MG/DOS, (OZEMPIC, 0.25 OR 0.5 MG/DOSE,) 2 MG/1.5ML SOPN Inject 2 mg into the skin once a week    Historical Provider, MD   Sucroferric Oxyhydroxide (VELPHORO) 500 MG CHEW Take 2 tablets by mouth 3 times daily (before meals)    Historical Provider, MD   ondansetron (ZOFRAN ODT) 4 MG disintegrating tablet Take 1 tablet by mouth every 8 hours as needed for Nausea or Vomiting 9/11/22   Nory Wall MD   Insulin Regular Human (HUMULIN R U-500 KWIKPEN SC) Inject into the skin OMNI POD 1 UNIT PER HOUR AND BOLUS 25 UNITS Q AM, 15 UNITS AT HS

## 2023-07-17 NOTE — PROGRESS NOTES
Progress Note      Pt Name: Anna Mota  YOB: 1960  MRN: [id number]    Date of evaluation: 7/18/2023  History Obtained From:  patient, electronic medical record    CHIEF COMPLAINT:    Chief Complaint   Patient presents with    Follow-up     Current active problems  Reactive leukocytosis  Anemia secondary to stage V chronic renal failure    HISTORY OF PRESENT ILLNESS:    Anna Mota is a 61 y.o.  female seen initially in the office on 3/30/2023 for persistent leukocytosis. She also has significant anemia, stage V chronic renal failure, previously on Mircera however hemoglobin up to 7 range. Her insurance would not pay for increasing her Suman Anil however she was changed to Retacrit and she takes 20,000 units on Monday Wednesday Friday. She denies any transfusions at her last visit. She does have home hemodialysis 4 to 5 days a week for about 3 hours. She had a sleep study last night. She is on a CPAP already. She is diabetic, most recent A1c was 6.0 on 4/17/2023. She has essential hypertension and reports that her blood pressure has been fairly stable on carvedilol 6.25 mg twice daily. He does have issues with hyperphosphatemia-she is on phosphate binders-has constipation issues but also has diarrhea at times. She has seasonal allergies and takes Zyrtec as needed. Anxiety is controlled with Wellbutrin 150 mg daily, Celexa 40 mg daily, Valium 2 mg as needed. Does take Synthroid 112 mcg daily. HEMATOLOGY HISTORY   Chanelle Mcmahon was seen in initial hematology consultation on 3/30/2023 referred by Dr. Alex Patterson for persistent leukocytosis. She has stage V chronic renal failure from diabetic nephropathy as well as hypertensive nephrosclerosis on chronic hemodialysis since 2021. He does receive Mircera every 2 weeks. He previously received IV iron as needed. She previously was followed by [facility] hematology where she received JEAN CLAUDE prior to starting on hemodialysis.   They

## 2023-07-18 ENCOUNTER — HOSPITAL ENCOUNTER (OUTPATIENT)
Dept: INFUSION THERAPY | Age: 63
Discharge: HOME | End: 2023-07-18
Payer: MEDICARE

## 2023-07-18 ENCOUNTER — OFFICE VISIT (OUTPATIENT)
Dept: HEMATOLOGY | Age: 63
End: 2023-07-18
Payer: MEDICARE

## 2023-07-18 VITALS
SYSTOLIC BLOOD PRESSURE: 140 MMHG | BODY MASS INDEX: 48.82 KG/M2 | WEIGHT: 293 LBS | HEIGHT: 65 IN | DIASTOLIC BLOOD PRESSURE: 82 MMHG | HEART RATE: 75 BPM | OXYGEN SATURATION: 98 % | TEMPERATURE: 97.8 F

## 2023-07-18 DIAGNOSIS — N18.5 ANEMIA, CHRONIC RENAL FAILURE, STAGE 5 (HCC): ICD-10-CM

## 2023-07-18 DIAGNOSIS — D72.829 LEUKOCYTOSIS, UNSPECIFIED TYPE: Primary | ICD-10-CM

## 2023-07-18 DIAGNOSIS — D72.829 LEUKOCYTOSIS, UNSPECIFIED TYPE: ICD-10-CM

## 2023-07-18 DIAGNOSIS — D63.1 ANEMIA, CHRONIC RENAL FAILURE, STAGE 5 (HCC): ICD-10-CM

## 2023-07-18 LAB
BASOPHILS # BLD: 0.06 K/UL (ref 0.01–0.08)
BASOPHILS NFR BLD: 0.5 % (ref 0.1–1.2)
EOSINOPHIL # BLD: 0.19 K/UL (ref 0.04–0.54)
EOSINOPHIL NFR BLD: 1.7 % (ref 0.7–7)
ERYTHROCYTE [DISTWIDTH] IN BLOOD BY AUTOMATED COUNT: 18.1 % (ref 11.7–14.4)
HCT VFR BLD AUTO: 35 % (ref 34.1–44.9)
HGB BLD-MCNC: 10.8 G/DL (ref 11.2–15.7)
LYMPHOCYTES # BLD: 1.57 K/UL (ref 1.18–3.74)
LYMPHOCYTES NFR BLD: 14.1 % (ref 19.3–53.1)
MCH RBC QN AUTO: 35.5 PG (ref 25.6–32.2)
MCHC RBC AUTO-ENTMCNC: 30.9 G/DL (ref 32.3–35.5)
MCV RBC AUTO: 115.1 FL (ref 79.4–94.8)
MONOCYTES # BLD: 0.57 K/UL (ref 0.24–0.82)
MONOCYTES NFR BLD: 5.1 % (ref 4.7–12.5)
NEUTROPHILS # BLD: 8.7 K/UL (ref 1.56–6.13)
NEUTS SEG NFR BLD: 78 % (ref 34–71.1)
PLATELET # BLD AUTO: 235 K/UL (ref 182–369)
PMV BLD AUTO: 9.4 FL (ref 7.4–10.4)
RBC # BLD AUTO: 3.04 M/UL (ref 3.93–5.22)
WBC # BLD AUTO: 11.16 K/UL (ref 3.98–10.04)

## 2023-07-18 PROCEDURE — 85025 COMPLETE CBC W/AUTO DIFF WBC: CPT

## 2023-07-18 PROCEDURE — 36415 COLL VENOUS BLD VENIPUNCTURE: CPT

## 2023-07-18 PROCEDURE — 3079F DIAST BP 80-89 MM HG: CPT | Performed by: PHYSICIAN ASSISTANT

## 2023-07-18 PROCEDURE — 99211 OFF/OP EST MAY X REQ PHY/QHP: CPT

## 2023-07-18 PROCEDURE — 3077F SYST BP >= 140 MM HG: CPT | Performed by: PHYSICIAN ASSISTANT

## 2023-07-18 PROCEDURE — 99213 OFFICE O/P EST LOW 20 MIN: CPT | Performed by: PHYSICIAN ASSISTANT

## 2023-07-18 ASSESSMENT — ENCOUNTER SYMPTOMS
SHORTNESS OF BREATH: 1
EYE DISCHARGE: 0
COUGH: 0
COLOR CHANGE: 0
NAUSEA: 0
SORE THROAT: 0
DIARRHEA: 1
WHEEZING: 0
TROUBLE SWALLOWING: 0
EYE ITCHING: 0
VOICE CHANGE: 0
BACK PAIN: 1
BLOOD IN STOOL: 0
VOMITING: 0
PHOTOPHOBIA: 0
ABDOMINAL DISTENTION: 0
ABDOMINAL PAIN: 0
CONSTIPATION: 1

## 2023-07-18 NOTE — PROGRESS NOTES
[facility]  [address]  Phone [phone] Fax [phone]     Sleep Study Technician Review    Patient Name:  Susana Nath  :   1960  Referring Provider: Allison Sandoval *    Brief History:  Susana Nath is a 61 y.o. Female with a history of insomnia, depression, anxiety who is referred for a sleep study. Height:    5\" 5\"  Weight: 310lbs  BMI:  51.5  Neck Circ: 17.5\"  Mallampati      3  ESS:  14    Type of Study: PSG with C PAP titration  Time Stage Position Snore Hypopnea Obs Apnea Albin Apnea PAP O2   2200  left lateral No No No No  RA   2300  left lateral No No No No  RA   2400  left lateral No Yes No No  RA   0100  left lateral No Yes No No  RA   0200  left lateral No Yes No No  RA   0300  left lateral No Yes No No  RA   0400  left lateral No No No No  RA   0500        RA   0600        RA     Summary: This pt arrived on time to the sleep center and was shown to her room. The pt was started on a CPAP pressure of 4 cm and was titrated to a pressure of  11 cm and tolerated well. No Nocturia. DME: Doy Hopping   Final PAP settings:   Mask Type: Nasal   Mask: mirage   Mask Size: medium    The study was reviewed briefly with Susana Nath. patient will be notified of the formal results and recommendations after the study is scored and interpreted. The report will be sent to his referring provider.     Technician: ZULEMA Rahman

## 2023-07-18 NOTE — PROGRESS NOTES
[facility]  [address]  Phone [phone] Fax [phone]        Polysomnography Report  Patient Name Serjio Wu. Chucky Acevedo Account Number [de-identified]    1960 Referring Provider Carlota Cabrera M.D.,[facility]   Age/ Gender 61 years/F Interpreting physician Carlota Cabrera M.D.,[facility]   Neck circumference/  Mallampati classification 17.5 in/class 3 Night Technician [address] [employer] Carlson,RPS   [place] score 14/24 Scoring Technician Baron Obed CRT, RPSGT   Height 65.0 in Indications for the test Obstructive Sleep Apnea   Weight 310.0 lbs Test Titration Polysomnogram   BMI 51.6 Date of test 2023     Procedure  A Titration Polysomnogram was conducted on the night of 2023. The study was performed and scored per AASM guidelines. The following were monitored: frontal, central, and occipital EEG, electrooculogram (EOG), submentalis EMG, nasal and oral airflow, intranasal pressure, thoracic plethysmography, abdominal plethysmography, anterior tibialis EMG, electrocardiogram, body position, and positive airway pressure (PAP). Arterial oxygen saturation was monitored with a pulse oximeter. The study was scored utilizing 30 second epochs. Hypopneas were scored using per AASM definition VIII, D, 1B.     Sleep Scoring Data  Lights out 10:16:36 PM Sleep latency 14.6 min Time in N1 10.5 min N1% 2.9%   Lights on 4:40:54 AM WASO 13.5 min Time in N2 280.5 min N2% 78.7%   TIB/.3 min Sleep efficiency 96.3% Time in N3 0.0 min N3% 0.0%   .2 min REM latency 208.0 min Time in R 65.2 min R% 18.3%     Respiratory Events Summary   NREM REM Total   Hypopnea index 3.5 0.0 2.9   Apnea index 3.9 0.0 3.2   RERA index 0.0 0.0 0.0   AHI 7.4 0.0 6.1   RDI (AHI + RERA index) 7.4 0.0 6.1     Respiratory Events by Sleep Stage   Obstructive Apneas OA Index Central Apneas CA Index Mixed Apneas MA Index   Hypopneas H Index   RERAs   R Index   NREM

## 2023-07-21 DIAGNOSIS — G47.33 OSA (OBSTRUCTIVE SLEEP APNEA): Primary | ICD-10-CM

## 2023-07-24 RX ORDER — FAMOTIDINE 20 MG/1
40 TABLET, FILM COATED ORAL 2 TIMES DAILY
Qty: 120 TABLET | Refills: 11 | Status: SHIPPED | OUTPATIENT
Start: 2023-07-24

## 2023-07-26 ENCOUNTER — OFFICE VISIT (OUTPATIENT)
Dept: PULMONOLOGY | Age: 63
End: 2023-07-26
Payer: MEDICARE

## 2023-07-26 VITALS
BODY MASS INDEX: 47.09 KG/M2 | HEART RATE: 66 BPM | OXYGEN SATURATION: 99 % | SYSTOLIC BLOOD PRESSURE: 124 MMHG | WEIGHT: 293 LBS | HEIGHT: 66 IN | DIASTOLIC BLOOD PRESSURE: 76 MMHG | TEMPERATURE: 96.1 F

## 2023-07-26 DIAGNOSIS — G47.10 HYPERSOMNIA: ICD-10-CM

## 2023-07-26 DIAGNOSIS — G47.33 OSA ON CPAP: Primary | ICD-10-CM

## 2023-07-26 DIAGNOSIS — Z99.89 OSA ON CPAP: Primary | ICD-10-CM

## 2023-07-26 DIAGNOSIS — E66.01 MORBID OBESITY (HCC): ICD-10-CM

## 2023-07-26 DIAGNOSIS — G47.8 NON-RESTORATIVE SLEEP: ICD-10-CM

## 2023-07-26 PROCEDURE — 99214 OFFICE O/P EST MOD 30 MIN: CPT | Performed by: INTERNAL MEDICINE

## 2023-07-26 PROCEDURE — 3078F DIAST BP <80 MM HG: CPT | Performed by: INTERNAL MEDICINE

## 2023-07-26 PROCEDURE — 3074F SYST BP LT 130 MM HG: CPT | Performed by: INTERNAL MEDICINE

## 2023-07-26 ASSESSMENT — ENCOUNTER SYMPTOMS
ABDOMINAL DISTENTION: 0
WHEEZING: 0
RHINORRHEA: 0
SHORTNESS OF BREATH: 0
APNEA: 1
BACK PAIN: 0
CHEST TIGHTNESS: 0
COUGH: 0
ANAL BLEEDING: 0
ABDOMINAL PAIN: 0

## 2023-07-26 NOTE — PROGRESS NOTES
Pulmonary and Sleep Medicine    Jorge Birmingham (:  1960) is a 61 y.o. female,Established patient, here for evaluation of the following chief complaint(s):  Follow-up      Referring physician:  No referring provider defined for this encounter. ASSESSMENT/PLAN:  1. GERI on CPAP  2. Morbid obesity ([address])  3. Non-restorative sleep  4. Hypersomnia        CPAP at 11 cm pressure. Reevaluate after 6 weeks. Alphonsus Shone, MD, [facility], [facility]    Return in about 6 weeks (around 2023). SUBJECTIVE/OBJECTIVE:  The patient is here for follow-up on obstructive sleep apnea. She had a CPAP titration at showed adequate control of her apneas at the CPAP at 11 cm pressure. The patient currently uses [facility] however she would like to switch to [address]. She is using her CPAP she is compliant with the CPAP she feels a CPAP helps. Prior to Visit Medications    Medication Sig Taking?  Authorizing Provider   levothyroxine (SYNTHROID) 112 MCG tablet  Yes Historical Provider, MD   rosuvastatin (CRESTOR) 20 MG tablet Take 1 tablet by mouth daily Yes Deepali Gilliland MD   traZODone (DESYREL) 100 MG tablet Take 1 tablet by mouth nightly Yes Prudence Balbuena MD   carBAMazepine (TEGRETOL-XR) 200 MG extended release tablet Take 1 tablet by mouth 2 times daily  Patient taking differently: Take 1 tablet by mouth 2 times daily 1 tablet every other night Yes Deepali Gilliland MD   citalopram (CELEXA) 40 MG tablet Take 1 tablet by mouth daily Yes Clarise Hodgkin, APRN - CNP   docusate sodium (COLACE) 100 MG capsule Take 1 capsule by mouth 2 times daily Yes Nina Koo MD   Semaglutide,0.25 or 0.5MG/DOS, (OZEMPIC, 0.25 OR 0.5 MG/DOSE,) 2 MG/1.5ML SOPN Inject 2 mg into the skin once a week Yes Historical Provider, MD   Sucroferric Oxyhydroxide (VELPHORO) 500 MG CHEW Take 2 tablets by mouth 3 times daily (before meals) Yes Historical Provider, MD   ondansetron (ZOFRAN ODT) 4 MG disintegrating tablet Take

## 2023-08-02 ENCOUNTER — HOSPITAL ENCOUNTER (OUTPATIENT)
Facility: HOSPITAL | Age: 63
Setting detail: HOSPITAL OUTPATIENT SURGERY
Discharge: HOME | End: 2023-08-02
Attending: ORTHOPAEDIC SURGERY | Admitting: ORTHOPAEDIC SURGERY
Payer: MEDICARE

## 2023-08-02 VITALS
BODY MASS INDEX: 48.82 KG/M2 | DIASTOLIC BLOOD PRESSURE: 55 MMHG | RESPIRATION RATE: 16 BRPM | OXYGEN SATURATION: 100 % | SYSTOLIC BLOOD PRESSURE: 127 MMHG | WEIGHT: 293 LBS | TEMPERATURE: 97.3 F | HEART RATE: 64 BPM | HEIGHT: 65 IN

## 2023-08-02 PROBLEM — G56.01 RIGHT CARPAL TUNNEL SYNDROME: Status: ACTIVE | Noted: 2023-08-02

## 2023-08-02 LAB
GLUCOSE BLDC GLUCOMTR-MCNC: 68 MG/DL (ref 70–130)
GLUCOSE BLDC GLUCOMTR-MCNC: 70 MG/DL (ref 70–130)

## 2023-08-02 PROCEDURE — 82948 REAGENT STRIP/BLOOD GLUCOSE: CPT

## 2023-08-02 RX ORDER — LIDOCAINE HYDROCHLORIDE 10 MG/ML
0.5 INJECTION, SOLUTION EPIDURAL; INFILTRATION; INTRACAUDAL; PERINEURAL ONCE AS NEEDED
Status: DISCONTINUED | OUTPATIENT
Start: 2023-08-02 | End: 2023-08-02

## 2023-08-02 RX ORDER — MAGNESIUM HYDROXIDE 1200 MG/15ML
LIQUID ORAL AS NEEDED
Status: DISCONTINUED | OUTPATIENT
Start: 2023-08-02 | End: 2023-08-02 | Stop reason: HOSPADM

## 2023-08-02 RX ORDER — SODIUM CHLORIDE 0.9 % (FLUSH) 0.9 %
3 SYRINGE (ML) INJECTION AS NEEDED
Status: DISCONTINUED | OUTPATIENT
Start: 2023-08-02 | End: 2023-08-02

## 2023-08-02 RX ORDER — LIDOCAINE HYDROCHLORIDE AND EPINEPHRINE 10; 10 MG/ML; UG/ML
INJECTION, SOLUTION INFILTRATION; PERINEURAL AS NEEDED
Status: DISCONTINUED | OUTPATIENT
Start: 2023-08-02 | End: 2023-08-02 | Stop reason: HOSPADM

## 2023-08-02 RX ORDER — SODIUM CHLORIDE 9 MG/ML
500 INJECTION, SOLUTION INTRAVENOUS CONTINUOUS
Status: DISCONTINUED | OUTPATIENT
Start: 2023-08-02 | End: 2023-08-02

## 2023-08-02 NOTE — OP NOTE
Patient Name: Messi  : 1960  MRN: [id number]      DATE of SURGERY: 2023    SURGEON: Juno Ledbetter MD    ASSISTANT: NONE    PREOPERATIVE DIAGNOSIS    Right carpal tunnel syndrome.       POSTOPERATIVE DIAGNOSIS    Right carpal tunnel syndrome.       PROCEDURE PERFORMED    Right open carpal tunnel release.       IMPLANTS  None.       ANESTHESIA    Local anesthesia only     OPERATIVE INDICATIONS    The patient is a 63 y.o. female who presented to my clinic with complaints of numbness and tingling in the hand with clinical exam and neurodiagnostic evidence of carpal tunnel syndrome.  The patient wished to proceed with surgery, understanding the risks, benefits, and alternatives.  Conservative treatment failed to improve symptoms.  The risks include, but are not limited to, that of anesthesia, bleeding, infection, pain, damage to the motor and sensory branch of the median nerve, chronic pillar pain, incomplete resolution of symptoms.       ESTIMATED BLOOD LOSS    Less than 5 mL.       SPECIMENS    None.       DRAINS  None.       COMPLICATIONS    None.       PROCEDURE IN DETAIL    The patient was met in the preoperative holding room where the correct patient, procedure and side were confirmed.  The operative site was marked by myself with the patient's agreement.  The consent was signed by myself.  Patient was transferred to the operating room, and a formal timeout was performed identifying the correct patient and the operative site. The tourniquet was then placed on the brachium of the operative extremity. A sterile prep and drape was performed.       A skin marker was used to delineate an approximately 1.5 cm incision in line with the radial aspect of the fourth ray beginning proximal to Nunez's cardinal line and ending distal to the wrist flexion crease.  Prior to inflation of the tourniquet, a local block was administered with 1% lidocaine with epinephrine. Approximately 3cc were injected at the wrist  flexion crease creating a subcutaneous wheal and the remaining 7 cc injected in line with the proposed incision. The operative extremity was then exsanguinated with an Esmarch and the tourniquet was inflated to 250 mmHg for less than 10 minutes.  A 15 blade scalpel was used to make a longitudinal incision only through the skin. Soft tissue was dissected in line with the incision through the palmar fascia. The transverse carpal ligament was identified and incised until the carpal tunnel contents were identified.  A distal release was performed first utilizing deep knife and blunt tipped scissor dissection until the palmar fat pad was identified. The proximal portion of the transverse carpal ligament was released in a similar fashion. Finally, a pair of blunt-tipped scissors was inserted with the points directed toward the superficial and ulnar aspect of the wrist to protect the palmar cutaneous branch of the median nerve and the distal forearm fascia was incised.  The release was then inspected both visually and on palpation with no additional constriction points appreciated.  The median nerve was inspected and found to be intact with no significant hypertrophic tenosynovium or soft tissue masses noted.  The tourniquet was then deflated hemostasis was obtained with bipolar electrocautery.  The incision was irrigated and the skin closed with 4-0 nylon sutures.  A soft dressing was placed, the patient was awakened from anesthesia and transported to the recovery room in stable condition. All counts at the end of the procedure were correct. Patient tolerated the procedure well and was without complication.      POSTOPERATIVE PLAN  Discharge home, return to clinic in 2 weeks for suture removal and activity as tolerated. No lifting heavier than 5 pounds for 2 weeks.

## 2023-08-07 NOTE — PROGRESS NOTES
[facility] - PODIATRY    Today's Date: 08/17/2023     Patient Name: Maria C Shrestha  MRN: [id number]  CSN: [id number]  PCP: Fatmata Wallace MD  Referring Provider: No ref. provider found    SUBJECTIVE     Chief Complaint   Patient presents with    Follow-up     Fatmata Wallace MD-01/10/2023 3 MTH FU DIABETIC- pt states feet doing ok other than ball of both feet have started thinking, feels like walking on bricks. Thinks poss getting neuropathy.- pt denies pain     Diabetes     98mg/dl BG this am     HPI: Maria C Shrestha, a 63 y.o.female, comes to clinic as a(n) established patient presenting for diabetic foot exam and complaining of painful toenails. Patient has h/o hypothyroidism, anemia, anxiety, arthritis, cellulitis, CKD, depression, thyroid disease, DM, HLD, HTN, fatigue, gallbladder abscess, obesity, . Patient is IDDM with last stated BG level of 98mg/dl. States that glucose is typically well controlled. States that her toenails are long, thickened, and irregular and that she is unable to reach her nails now to care for them herself. Uses wheelchair for long distances. Has CKD and is treated with dialysis. Notes sensation in the feet that feels like she is walking on something/has something attached to feet.  Denies pain. Relates previous treatment(s) including care by podiatry . Denies any constitutional symptoms. No other pedal complaints at this time.    Past Medical History:   Diagnosis Date    Acquired hypothyroidism 02/06/2017    Allergic rhinitis     Anemia due to stage 4 chronic kidney disease 08/18/2020    Anxiety     Arthritis     Carpal tunnel syndrome 07/2023    Cellulitis     CHF (congestive heart failure)     Chronic kidney disease     3rd stage    Depression     Dialysis patient     AV fistula in Left arm    Dyslipidemia     Elevated cholesterol     Essential hypertension     Fatigue     Gallbladder abscess     Hearing loss     History of transfusion     Insulin pump  in place     Omnipod    Light headed     Limited range of motion (ROM) of shoulder     right    Obesity     Obstructive sleep apnea treated with continuous positive airway pressure (CPAP)     Palpitation     Short of breath on exertion     Sinusitis     Stage 4 chronic kidney disease 09/16/2020    Type 2 diabetes mellitus     Type II diabetes mellitus, uncontrolled     Wears glasses      Past Surgical History:   Procedure Laterality Date    ADENOIDECTOMY      ANAL FISTULA REPAIR Left     x 2     ARTERIOVENOUS FISTULA Left 08/2021    CARPAL TUNNEL RELEASE Left 05/24/2023    Procedure: LEFT OPEN CARPAL TUNNEL RELEASE;  Surgeon: Juno Ledbetter MD;  Location: [facility] OR;  Service: Orthopedics;  Laterality: Left;    CARPAL TUNNEL RELEASE Right 8/2/2023    Procedure: RIGHT OPEN CARPAL TUNNEL RELEASE;  Surgeon: Juno Ledbetter MD;  Location: [facility] OR;  Service: Orthopedics;  Laterality: Right;    CHOLECYSTECTOMY      COLONOSCOPY  01/02/2014    COLONOSCOPY N/A 03/22/2017    Procedure: COLONOSCOPY WITH ANESTHESIA;  Surgeon: Mono Linder MD;  Location: [facility] ENDOSCOPY;  Service:     COLONOSCOPY N/A 05/09/2022    Procedure: COLONOSCOPY WITH ANESTHESIA;  Surgeon: Mono Linder MD;  Location: [facility] ENDOSCOPY;  Service: Gastroenterology;  Laterality: N/A;  pre polyp;brbpr  post  Dr. Soliman    D & C HYSTEROSCOPY N/A 07/25/2018    Procedure: DILATATION AND CURETTAGE HYSTEROSCOPY;  Surgeon: Michael Castaneda MD;  Location: [facility] OR;  Service: Obstetrics/Gynecology    DILATATION AND CURETTAGE      X 2    ENDOSCOPY      ENDOSCOPY N/A 05/09/2022    Procedure: ESOPHAGOGASTRODUODENOSCOPY WITH ANESTHESIA;  Surgeon: Mono Linder MD;  Location: [facility] ENDOSCOPY;  Service: Gastroenterology;  Laterality: N/A;  pre screen  post gastric polyps  Dr. Soliman    ENDOSCOPY N/A 11/28/2022    Procedure: ESOPHAGOGASTRODUODENOSCOPY WITH ANESTHESIA;  Surgeon: Mono Linder MD;  Location: [facility] ENDOSCOPY;  Service:  Gastroenterology;  Laterality: N/A;  pre: hx gastric polyp  post: Retained food, gastritis  dr sebastián nguyen    ENDOSCOPY N/A 12/29/2022    Procedure: ESOPHAGOGASTRODUODENOSCOPY;  Surgeon: Mono Linder MD;  Location: [facility] ENDOSCOPY;  Service: Gastroenterology;  Laterality: N/A;  preop; hx of polyps  postop gastric polyps  PCP Bette Wallace MD    ENDOSCOPY N/A 06/07/2023    Procedure: ESOPHAGOGASTRODUODENOSCOPY;  Surgeon: Mono Linder MD;  Location: [facility] ENDOSCOPY;  Service: Gastroenterology;  Laterality: N/A;  pre hx gastric polyp  post normal  dr bette brewer    HYSTERECTOMY      TONSILLECTOMY       Family History   Problem Relation Age of Onset    Diabetes Other     Heart failure Other     Cancer Other     Kidney disease Other     Lung cancer Mother     Heart disease Father     Diabetes Father     Obesity Father     Stroke Father     Prostate cancer Father     Cancer Maternal Grandmother     Uterine cancer Maternal Grandmother     Diabetes Maternal Grandfather     Cancer Paternal Grandmother     Colon cancer Paternal Grandmother     Diabetes Paternal Grandfather     Heart disease Paternal Grandfather     No Known Problems Sister     No Known Problems Brother     No Known Problems Daughter     No Known Problems Maternal Aunt     No Known Problems Paternal Aunt     BRCA 1/2 Neg Hx     Breast cancer Neg Hx     Endometrial cancer Neg Hx     Ovarian cancer Neg Hx      Social History     Socioeconomic History    Marital status:    Tobacco Use    Smoking status: Never    Smokeless tobacco: Never   Vaping Use    Vaping Use: Never used   Substance and Sexual Activity    Alcohol use: No    Drug use: No    Sexual activity: Defer     Birth control/protection: Post-menopausal, Hysterectomy     Allergies   Allergen Reactions    Codeine Nausea And Vomiting and GI Intolerance     Current Outpatient Medications   Medication Sig Dispense Refill    allopurinol (ZYLOPRIM) 100 MG tablet Take 1  "tablet by mouth Daily. 30 tablet 11    Ascorbic Acid (VITAMIN C PO) Take 1 tablet by mouth Daily.      aspirin 81 MG tablet Take 1 tablet by mouth Daily.      BD Insulin Syringe U/F 31G X 5/16\" 1 ML misc AS DIRECTED FOUR TIMES A  each 1    buPROPion SR (Wellbutrin SR) 100 MG 12 hr tablet Take 1 tablet by mouth 2 (Two) Times a Day. 60 tablet 1    calcitriol (ROCALTROL) 0.5 MCG capsule Take 1 capsule by mouth Daily.      carvedilol (COREG) 6.25 MG tablet Take 1 tablet by mouth 2 (Two) Times a Day With Meals. 1/2 tab Q morning & pm      cetirizine (zyrTEC) 10 MG tablet Take 1 tablet by mouth.      citalopram (CeleXA) 40 MG tablet Take 1 tablet by mouth Daily. 30 tablet 0    Continuous Blood Gluc Sensor (FreeStyle Filippo 2 Sensor) misc 1 each Every 14 (Fourteen) Days. Left arm 2 each 3    diazePAM (Valium) 5 MG tablet Take 1 tablet by mouth As Needed for Anxiety. 30 tablet 0    Docusate Calcium (STOOL SOFTENER PO) Take 1 tablet by mouth 2 (Two) Times a Day As Needed (constipation).      Epoetin Amadeo-epbx (Retacrit) 87775 UNIT/ML injection Inject 1 mL under the skin into the appropriate area as directed 3 (Three) Times a Week.      famotidine (PEPCID) 20 MG tablet Take 2 tablets by mouth 2 (Two) Times a Day. 120 tablet 11    fluticasone (FLONASE) 50 MCG/ACT nasal spray 1 spray into the nostril(s) as directed by provider 2 (Two) Times a Day.      Heparin Sod, Pork, Lock Flush (Heparin Na, Pork, Lock Flsh PF) 1 UNIT/ML solution lock flush 6,000 mL by Intracatheter route Every 8 (Eight) Hours. Administers before every dialysis treatment, which is 4-5 times a week at home      Insulin Disposable Pump (Omnipod 5 G6 Pod, Gen 5,) misc 1 each Every Other Day. 15 each 6    insulin regular (HUMULIN R) 500 UNIT/ML CONCENTRATED injection Inject  under the skin into the appropriate area as directed 3 (Three) Times a Day Before Meals. (Patient taking differently: Inject  under the skin into the appropriate area as directed 3 " (Three) Times a Day Before Meals. Patient states will pause for surgery.) 120 mL 3    levothyroxine (Synthroid) 112 MCG tablet Take 1 tablet by mouth Daily. 30 tablet 11    Misc. Devices (Bariatric Rollator) misc Please provide device for patient 1 each 0    ONDANSETRON PO Take  by mouth As Needed.      POTASSIUM PO Take 1 tablet by mouth Daily.      Repatha solution prefilled syringe injection Inject 1 mL under theskin into the appropriate area as directed Every 14 (Fourteen) Days. 2 mL 4    rosuvastatin (CRESTOR) 20 MG tablet Take 1 tablet by mouth Every Night. 30 tablet 0    Semaglutide, 2 MG/DOSE, (Ozempic, 2 MG/DOSE,) 8 MG/3ML solution pen-injector Inject 2 mg under the skin into the appropriate area as directed 1 (One) Time Per Week. Please cancel Trulicity 3 mL 3    sevelamer (RENVELA) 800 MG tablet Take 4 tablets with each meal and 2 tablets with a snack      terbinafine (lamISIL) 1 % cream Apply  topically to the appropriate area as directed 2 (Two) Times a Day. 36 g 2     No current facility-administered medications for this visit.     Review of Systems   Constitutional:  Negative for chills and fever.   HENT:  Negative for congestion.    Respiratory:  Negative for shortness of breath.    Cardiovascular:  Negative for chest pain and leg swelling.   Gastrointestinal:  Negative for constipation, diarrhea, nausea and vomiting.   Musculoskeletal:  Positive for arthralgias.        Foot pain   Skin:  Negative for wound.   Neurological:  Positive for numbness.     OBJECTIVE     Vitals:    23 1017   BP: 132/68       PHYSICAL EXAM  GEN:   Accompanied by s/o.     Foot/Ankle Exam    GENERAL  Diabetic foot exam performed    Appearance:  obese  Orientation:  AAOx3  Affect:  appropriate  Assistance:  wheelchair  Right shoe gear: casual shoe  Left shoe gear: casual shoe    VASCULAR     Right Foot Vascularity   Dorsalis pedis:  2+  Posterior tibial:  2+  Skin temperature:  warm  Edema gradin+ and  non-pitting  CFT:  3  Pedal hair growth:  Present  Varicosities:  none     Left Foot Vascularity   Dorsalis pedis:  2+  Posterior tibial:  2+  Skin temperature:  warm  Edema gradin+ and non-pitting  CFT:  3  Pedal hair growth:  Present  Varicosities:  none     NEUROLOGIC     Right Foot Neurologic   Normal sensation    Light touch sensation: normal  Vibratory sensation: normal  Hot/Cold sensation: normal     Left Foot Neurologic   Normal sensation    Light touch sensation: normal  Vibratory sensation: normal  Hot/Cold sensation:  normal    MUSCULOSKELETAL     Right Foot Musculoskeletal   Tenderness:  toenail problem    Arch:  Normal  Hallux valgus: No       Left Foot Musculoskeletal   Tenderness:  toenail problem  Arch:  Normal  Hallux valgus: No      MUSCLE STRENGTH     Right Foot Muscle Strength   Foot dorsiflexion:  5  Foot plantar flexion:  5  Foot inversion:  5  Foot eversion:  5     Left Foot Muscle Strength   Foot dorsiflexion:  5  Foot plantar flexion:  5  Foot inversion:  5  Foot eversion:  5    RANGE OF MOTION     Right Foot Range of Motion   Foot and ankle ROM within normal limits       Left Foot Range of Motion   Foot and ankle ROM within normal limits      DERMATOLOGIC      Right Foot Dermatologic   Skin  Negative for tinea.   Nails  1.  Positive for elongated, onychomycosis, abnormal thickness and subungual debris.  2.  Positive for onychomycosis, abnormal thickness and subungual debris.  3.  Positive for onychomycosis, abnormal thickness and subungual debris.  4.  Positive for onychomycosis, abnormal thickness and subungual debris.  5.  Positive for onychomycosis, abnormal thickness and subungual debris.     Left Foot Dermatologic   Skin  Negative for tinea.   Nails  1.  Positive for elongated, onychomycosis, abnormal thickness and subungual debris.  2.  Positive for onychomycosis, abnormal thickness and subungual debris.  3.  Positive for onychomycosis, abnormal thickness, subungual debris and  dystrophic nail.  4.  Positive for onychomycosis, abnormally thick and subungual debris.  5.  Positive for onychomycosis, abnormally thick and subungual debris.    RADIOLOGY/NUCLEAR:  No results found.    LABORATORY/CULTURE RESULTS:      PATHOLOGY RESULTS:       ASSESSMENT/PLAN     Diagnoses and all orders for this visit:    1. Onychomycosis (Primary)    2. Controlled type 2 diabetes mellitus with stage 4 chronic kidney disease, without long-term current use of insulin    3. Chronic kidney disease, stage 4 (severe)      Comprehensive lower extremity examination and evaluation was performed.  Discussed findings and treatment plan including risks, benefits, and treatment options with patient in detail. Patient agreed with treatment plan.  After verbal consent obtained, nail(s) x10 debrided of length and thickness with nail nipper without incidence  Patient may maintain nails and calluses at home utilizing emery board or pumice stone between visits as needed  Reviewed at home diabetic foot care including daily foot checks   Continue diabetic monitoring and control under direction of PCP.  An After Visit Summary was printed and given to the patient at discharge, including (if requested) any available informative/educational handouts regarding diagnosis, treatment, or medications. All questions were answered to patient/family satisfaction. Should symptoms fail to improve or worsen they agree to call or return to clinic or to go to the Emergency Department. Discussed the importance of following up with any needed screening tests/labs/specialist appointments and any requested follow-up recommended by me today. Importance of maintaining follow-up discussed and patient accepts that missed appointments can delay diagnosis and potentially lead to worsening of conditions.  Return in about 3 months (around 11/17/2023)., or sooner if acute issues arise.    Lab Frequency Next Occurrence   Follow Anesthesia Guidelines / Protocol  Once 04/20/2022   Obtain Informed Consent Once 04/25/2022   Diet: Once 05/09/2022   Diet: Once 11/28/2022   Follow Anesthesia Guidelines / Protocol Once 12/05/2022   Diet: Once 12/29/2022       This document has been electronically signed by FLOR Coleman on August 17, 2023 10:36 CDT

## 2023-08-15 ENCOUNTER — NURSE ONLY (OUTPATIENT)
Dept: PRIMARY CARE CLINIC | Age: 63
End: 2023-08-15
Payer: MEDICARE

## 2023-08-15 DIAGNOSIS — E53.8 B12 DEFICIENCY: Primary | ICD-10-CM

## 2023-08-15 PROCEDURE — 96372 THER/PROPH/DIAG INJ SC/IM: CPT | Performed by: INTERNAL MEDICINE

## 2023-08-15 RX ORDER — CYANOCOBALAMIN 1000 UG/ML
1000 INJECTION, SOLUTION INTRAMUSCULAR; SUBCUTANEOUS ONCE
Status: COMPLETED | OUTPATIENT
Start: 2023-08-15 | End: 2023-08-15

## 2023-08-15 RX ADMIN — CYANOCOBALAMIN 1000 MCG: 1000 INJECTION, SOLUTION INTRAMUSCULAR; SUBCUTANEOUS at 10:13

## 2023-08-16 ENCOUNTER — TELEPHONE (OUTPATIENT)
Dept: PODIATRY | Facility: CLINIC | Age: 63
End: 2023-08-16
Payer: MEDICARE

## 2023-08-17 ENCOUNTER — OFFICE VISIT (OUTPATIENT)
Dept: PODIATRY | Facility: CLINIC | Age: 63
End: 2023-08-17
Payer: MEDICARE

## 2023-08-17 VITALS
SYSTOLIC BLOOD PRESSURE: 132 MMHG | DIASTOLIC BLOOD PRESSURE: 68 MMHG | WEIGHT: 293 LBS | BODY MASS INDEX: 48.82 KG/M2 | HEIGHT: 65 IN

## 2023-08-17 DIAGNOSIS — N18.4 CHRONIC KIDNEY DISEASE, STAGE 4 (SEVERE): ICD-10-CM

## 2023-08-17 DIAGNOSIS — N18.4 CONTROLLED TYPE 2 DIABETES MELLITUS WITH STAGE 4 CHRONIC KIDNEY DISEASE, WITHOUT LONG-TERM CURRENT USE OF INSULIN: ICD-10-CM

## 2023-08-17 DIAGNOSIS — E11.22 CONTROLLED TYPE 2 DIABETES MELLITUS WITH STAGE 4 CHRONIC KIDNEY DISEASE, WITHOUT LONG-TERM CURRENT USE OF INSULIN: ICD-10-CM

## 2023-08-17 DIAGNOSIS — B35.1 ONYCHOMYCOSIS: Primary | ICD-10-CM

## 2023-08-21 ENCOUNTER — TELEPHONE (OUTPATIENT)
Dept: PRIMARY CARE CLINIC | Age: 63
End: 2023-08-21

## 2023-08-21 DIAGNOSIS — I10 PRIMARY HYPERTENSION: Primary | ICD-10-CM

## 2023-08-21 RX ORDER — CARVEDILOL 3.12 MG/1
3.12 TABLET ORAL 2 TIMES DAILY
Qty: 180 TABLET | Refills: 1 | Status: SHIPPED | OUTPATIENT
Start: 2023-08-21

## 2023-08-21 NOTE — TELEPHONE ENCOUNTER
Patient called to report she is taking carvedilol 6.25 mg 0.5 tablets twice daily and would like to have a prescription for the 3.125 mg tablets so she does not have to cut the tablets. Please advise.      Pharmacy: [facility]

## 2023-08-24 ENCOUNTER — TELEPHONE (OUTPATIENT)
Dept: PRIMARY CARE CLINIC | Age: 63
End: 2023-08-24

## 2023-08-24 DIAGNOSIS — F33.1 MAJOR DEPRESSIVE DISORDER, RECURRENT, MODERATE (HCC): ICD-10-CM

## 2023-08-24 RX ORDER — CITALOPRAM 40 MG/1
40 TABLET ORAL DAILY
Qty: 30 TABLET | Refills: 2 | Status: SHIPPED | OUTPATIENT
Start: 2023-08-24 | End: 2023-08-25 | Stop reason: SDUPTHER

## 2023-08-24 NOTE — TELEPHONE ENCOUNTER
Patient called to request a prescription for diabetic shoes stating she has seen Dr Roxy Mansfield for podiatry and he performed a foot exam and told her she needs the shoes. She stated Dr Roxy Mansfield does not prescribe for the shoes but would send his office note with the exam findings. Instructed patient that he has not yet sen this notes but that Dr Jeremias Jensen will write for the shoes once the note is received. Patient verbalized understanding and stated she would contact this office to send the note.

## 2023-08-24 NOTE — TELEPHONE ENCOUNTER
Jorge Richardson called to request a refill on her medication.       Last office visit : 6/6/2023   Next office visit : 10/9/2023     Requested Prescriptions     Pending Prescriptions Disp Refills    citalopram (CELEXA) 40 MG tablet 30 tablet 2     Sig: Take 1 tablet by mouth daily            Sania Garcia LPN

## 2023-08-25 RX ORDER — CITALOPRAM 40 MG/1
40 TABLET ORAL DAILY
Qty: 90 TABLET | Refills: 1 | Status: SHIPPED | OUTPATIENT
Start: 2023-08-25 | End: 2024-02-21

## 2023-08-31 ENCOUNTER — DOCUMENTATION (OUTPATIENT)
Dept: ENDOCRINOLOGY | Facility: CLINIC | Age: 63
End: 2023-08-31
Payer: MEDICARE

## 2023-09-01 ENCOUNTER — TELEPHONE (OUTPATIENT)
Dept: PULMONOLOGY | Age: 63
End: 2023-09-01

## 2023-09-01 NOTE — TELEPHONE ENCOUNTER
Upon receiving patients compliance report, it was noted that patient just set it up on 8/7/23 so they included a 25 day download

## 2023-09-06 ENCOUNTER — OFFICE VISIT (OUTPATIENT)
Dept: PULMONOLOGY | Age: 63
End: 2023-09-06
Payer: MEDICARE

## 2023-09-06 VITALS
OXYGEN SATURATION: 96 % | SYSTOLIC BLOOD PRESSURE: 115 MMHG | TEMPERATURE: 97.8 F | BODY MASS INDEX: 48.82 KG/M2 | DIASTOLIC BLOOD PRESSURE: 73 MMHG | HEART RATE: 72 BPM | HEIGHT: 65 IN | WEIGHT: 293 LBS

## 2023-09-06 DIAGNOSIS — E66.01 MORBID OBESITY (HCC): ICD-10-CM

## 2023-09-06 DIAGNOSIS — G47.33 OSA ON CPAP: Primary | ICD-10-CM

## 2023-09-06 DIAGNOSIS — G47.10 HYPERSOMNIA: ICD-10-CM

## 2023-09-06 DIAGNOSIS — G47.8 NON-RESTORATIVE SLEEP: ICD-10-CM

## 2023-09-06 DIAGNOSIS — Z99.89 OSA ON CPAP: Primary | ICD-10-CM

## 2023-09-06 PROCEDURE — 99213 OFFICE O/P EST LOW 20 MIN: CPT | Performed by: INTERNAL MEDICINE

## 2023-09-06 PROCEDURE — 3074F SYST BP LT 130 MM HG: CPT | Performed by: INTERNAL MEDICINE

## 2023-09-06 PROCEDURE — 3078F DIAST BP <80 MM HG: CPT | Performed by: INTERNAL MEDICINE

## 2023-09-06 ASSESSMENT — ENCOUNTER SYMPTOMS
COUGH: 0
RHINORRHEA: 0
APNEA: 1
BACK PAIN: 0
CHEST TIGHTNESS: 0
ABDOMINAL DISTENTION: 0
ANAL BLEEDING: 0
WHEEZING: 0
ABDOMINAL PAIN: 0
SHORTNESS OF BREATH: 1

## 2023-09-06 NOTE — PROGRESS NOTES
Pulmonary and Sleep Medicine    Naa Balbuena (:  1960) is a 61 y.o. female,Established patient, here for evaluation of the following chief complaint(s):  Follow-up (6 wk fu )      Referring physician:  No referring provider defined for this encounter. ASSESSMENT/PLAN:  1. GERI on CPAP  2. Morbid obesity ([address])  3. Non-restorative sleep  4. Hypersomnia        Continue current management with the CPAP she is compliant with the CPAP she feels a CPAP helps. Jerry Sanabria MD, [facility], [facility]    Return in about 6 months (around 3/6/2024). SUBJECTIVE/OBJECTIVE:  The patient is here for follow-up on obstructive sleep apnea. Her CPAP compliance data was reviewed. Her average use of the CPAP is about 7 hours and 45 minutes. Her apnea-hypopnea index is less than 5 events per hour as reported by the machine. The patient feels that the machine is helping her sleep apnea symptoms. Prior to Visit Medications    Medication Sig Taking?  Authorizing Provider   citalopram (CELEXA) 40 MG tablet Take 1 tablet by mouth daily  Deepali Gilliland MD   carvedilol (COREG) 3.125 MG tablet Take 1 tablet by mouth 2 times daily  Deepali Gilliland MD   levothyroxine (SYNTHROID) 112 MCG tablet   Historical Provider, MD   rosuvastatin (CRESTOR) 20 MG tablet Take 1 tablet by mouth daily  Deepali Gilliland MD   traZODone (DESYREL) 100 MG tablet Take 1 tablet by mouth nightly  Gladystine MD Carmen   carBAMazepine (TEGRETOL-XR) 200 MG extended release tablet Take 1 tablet by mouth 2 times daily  Patient taking differently: Take 1 tablet by mouth 2 times daily 1 tablet every other night  Deepali Gilliland MD   docusate sodium (COLACE) 100 MG capsule Take 1 capsule by mouth 2 times daily  Marga Dejesus MD   Semaglutide,0.25 or 0.5MG/DOS, (OZEMPIC, 0.25 OR 0.5 MG/DOSE,) 2 MG/1.5ML SOPN Inject 2 mg into the skin once a week  Historical Provider, MD   Sucroferric Oxyhydroxide (VELPHORO) 500 MG CHEW Take 2

## 2023-09-11 RX ORDER — INSULIN HUMAN 500 [IU]/ML
INJECTION, SOLUTION SUBCUTANEOUS
Qty: 40 ML | Refills: 11 | Status: SHIPPED | OUTPATIENT
Start: 2023-09-11

## 2023-09-15 ENCOUNTER — NURSE ONLY (OUTPATIENT)
Dept: PRIMARY CARE CLINIC | Age: 63
End: 2023-09-15

## 2023-09-15 DIAGNOSIS — E53.8 B12 DEFICIENCY: Primary | ICD-10-CM

## 2023-09-15 RX ORDER — CYANOCOBALAMIN 1000 UG/ML
1000 INJECTION, SOLUTION INTRAMUSCULAR; SUBCUTANEOUS ONCE
Status: COMPLETED | OUTPATIENT
Start: 2023-09-15 | End: 2023-09-15

## 2023-09-15 RX ADMIN — CYANOCOBALAMIN 1000 MCG: 1000 INJECTION, SOLUTION INTRAMUSCULAR; SUBCUTANEOUS at 12:46

## 2023-09-27 ENCOUNTER — TRANSCRIBE ORDERS (OUTPATIENT)
Dept: ADMINISTRATIVE | Facility: HOSPITAL | Age: 63
End: 2023-09-27
Payer: MEDICARE

## 2023-09-27 DIAGNOSIS — Z12.31 ENCOUNTER FOR SCREENING MAMMOGRAM FOR MALIGNANT NEOPLASM OF BREAST: Primary | ICD-10-CM

## 2023-09-28 ENCOUNTER — HOSPITAL ENCOUNTER (OUTPATIENT)
Dept: MAMMOGRAPHY | Facility: HOSPITAL | Age: 63
Discharge: HOME | End: 2023-09-28
Admitting: INTERNAL MEDICINE
Payer: MEDICARE

## 2023-09-28 DIAGNOSIS — Z12.31 ENCOUNTER FOR SCREENING MAMMOGRAM FOR MALIGNANT NEOPLASM OF BREAST: ICD-10-CM

## 2023-09-28 PROCEDURE — 77063 BREAST TOMOSYNTHESIS BI: CPT

## 2023-09-28 PROCEDURE — 77067 SCR MAMMO BI INCL CAD: CPT

## 2023-10-03 DIAGNOSIS — Z12.31 BREAST CANCER SCREENING BY MAMMOGRAM: ICD-10-CM

## 2023-10-08 PROBLEM — E11.40 TYPE 2 DIABETES MELLITUS WITH DIABETIC NEUROPATHY, UNSPECIFIED (HCC): Status: ACTIVE | Noted: 2021-08-13

## 2023-10-08 PROBLEM — N18.6 STAGE 5 CHRONIC KIDNEY DISEASE ON CHRONIC DIALYSIS (HCC): Status: ACTIVE | Noted: 2021-08-19

## 2023-10-08 PROBLEM — T78.2XXA ANAPHYLACTIC SHOCK, UNSPECIFIED, INITIAL ENCOUNTER: Status: ACTIVE | Noted: 2021-08-13

## 2023-10-08 PROBLEM — G56.02 LEFT CARPAL TUNNEL SYNDROME: Status: ACTIVE | Noted: 2023-05-24

## 2023-10-08 PROBLEM — Z99.2 DEPENDENCE ON RENAL DIALYSIS (HCC): Status: ACTIVE | Noted: 2022-01-20

## 2023-10-08 PROBLEM — D50.9 IRON DEFICIENCY ANEMIA, UNSPECIFIED: Status: ACTIVE | Noted: 2021-08-18

## 2023-10-08 PROBLEM — G56.01 RIGHT CARPAL TUNNEL SYNDROME: Status: ACTIVE | Noted: 2023-08-02

## 2023-10-08 PROBLEM — E11.22 TYPE 2 DIABETES MELLITUS WITH END-STAGE RENAL DISEASE (HCC): Status: ACTIVE | Noted: 2017-02-26

## 2023-10-08 PROBLEM — K31.7 GASTRIC POLYPS: Status: ACTIVE | Noted: 2023-04-11

## 2023-10-08 PROBLEM — N18.6 TYPE 2 DIABETES MELLITUS WITH END-STAGE RENAL DISEASE (HCC): Status: ACTIVE | Noted: 2017-02-26

## 2023-10-08 PROBLEM — N18.6 END STAGE RENAL DISEASE (HCC): Status: ACTIVE | Noted: 2021-08-13

## 2023-10-08 PROBLEM — N11.0: Status: ACTIVE | Noted: 2022-06-25

## 2023-10-08 PROBLEM — E87.5 HYPERKALEMIA: Status: ACTIVE | Noted: 2021-08-13

## 2023-10-08 PROBLEM — E83.30 DISORDER OF PHOSPHORUS METABOLISM, UNSPECIFIED: Status: ACTIVE | Noted: 2021-08-13

## 2023-10-08 PROBLEM — Z99.2 STAGE 5 CHRONIC KIDNEY DISEASE ON CHRONIC DIALYSIS (HCC): Status: ACTIVE | Noted: 2021-08-19

## 2023-10-08 PROBLEM — N25.81 SECONDARY HYPERPARATHYROIDISM OF RENAL ORIGIN (HCC): Status: ACTIVE | Noted: 2021-08-18

## 2023-10-08 PROBLEM — E03.9 ACQUIRED HYPOTHYROIDISM: Status: ACTIVE | Noted: 2017-02-06

## 2023-10-08 PROBLEM — R07.9 CHEST PAIN, UNSPECIFIED: Status: ACTIVE | Noted: 2021-08-13

## 2023-10-08 ASSESSMENT — ENCOUNTER SYMPTOMS
COUGH: 0
VOMITING: 0
SINUS PAIN: 0
APNEA: 0
SHORTNESS OF BREATH: 1
CONSTIPATION: 0
ABDOMINAL DISTENTION: 0
BACK PAIN: 0
RHINORRHEA: 0
TROUBLE SWALLOWING: 0
BLOOD IN STOOL: 0
WHEEZING: 0
ABDOMINAL PAIN: 0
NAUSEA: 0
SORE THROAT: 0
DIARRHEA: 0
CHEST TIGHTNESS: 0

## 2023-10-08 NOTE — PROGRESS NOTES
Tita Alanis ( 1960) is a 61 y.o. female, established here for evaluation of the following chief complaint(s).   Follow-up Chronic Condition      Patient was encouraged and advised to be compliant with all  medications leads an active lifestyle and promote maintaining a healthy weight, encouraged not to use cigarettes, laboratory results discussed and reviewed with patient's during this visit   ASSESSMENT/PLAN:  Problem List          Circulatory    Hypertension      Well-controlled, continue current medications         Relevant Medications    carvedilol (COREG) 3.125 MG tablet       Respiratory    GERI on CPAP      Well-controlled, continue current medications            Endocrine    Type 2 diabetes mellitus with ESRD (end-stage renal disease) (HCC) - Primary    Relevant Medications    Insulin Regular Human (HUMULIN R U-500 KWIKPEN SC)    Semaglutide,0.25 or 0.5MG/DOS, (OZEMPIC, 0.25 OR 0.5 MG/DOSE,) 2 MG/1.5ML SOPN    Diabetic Shoe MISC    Type 2 diabetes mellitus with end-stage renal disease (HCC)    Relevant Medications    Insulin Regular Human (HUMULIN R U-500 KWIKPEN SC)    Semaglutide,0.25 or 0.5MG/DOS, (OZEMPIC, 0.25 OR 0.5 MG/DOSE,) 2 MG/1.5ML SOPN    Diabetic Shoe MISC    Mixed diabetic hyperlipidemia associated with type 2 diabetes mellitus (HCC)    Relevant Medications    aspirin 81 MG tablet    Insulin Regular Human (HUMULIN R U-500 KWIKPEN SC)    Evolocumab (REPATHA SC)    Semaglutide,0.25 or 0.5MG/DOS, (OZEMPIC, 0.25 OR 0.5 MG/DOSE,) 2 MG/1.5ML SOPN    carvedilol (COREG) 3.125 MG tablet    rosuvastatin (CRESTOR) 20 MG tablet    Acquired hypothyroidism    Relevant Medications    levothyroxine (SYNTHROID) 112 MCG tablet         2022    11:26 AM   SPENSER 7 SCORE   SPENSER-7 Total Score 9           2023     1:39 PM 2023     8:33 AM 3/13/2023    12:16 PM 3/2/2023    10:15 AM 1/10/2023    11:08 AM   PHQ Scores   PHQ2 Score 2 4 3 2 2   PHQ9 Score 9 17 13 11 10       Results for orders placed or

## 2023-10-09 ENCOUNTER — OFFICE VISIT (OUTPATIENT)
Dept: PRIMARY CARE CLINIC | Age: 63
End: 2023-10-09
Payer: MEDICAID

## 2023-10-09 VITALS
HEIGHT: 65 IN | SYSTOLIC BLOOD PRESSURE: 132 MMHG | OXYGEN SATURATION: 98 % | WEIGHT: 293 LBS | BODY MASS INDEX: 48.82 KG/M2 | DIASTOLIC BLOOD PRESSURE: 72 MMHG | HEART RATE: 69 BPM | TEMPERATURE: 97.5 F | RESPIRATION RATE: 18 BRPM

## 2023-10-09 DIAGNOSIS — I10 PRIMARY HYPERTENSION: ICD-10-CM

## 2023-10-09 DIAGNOSIS — E11.22 TYPE 2 DIABETES MELLITUS WITH END-STAGE RENAL DISEASE (HCC): ICD-10-CM

## 2023-10-09 DIAGNOSIS — G47.33 OSA ON CPAP: ICD-10-CM

## 2023-10-09 DIAGNOSIS — N18.6 TYPE 2 DIABETES MELLITUS WITH ESRD (END-STAGE RENAL DISEASE) (HCC): Primary | ICD-10-CM

## 2023-10-09 DIAGNOSIS — D72.829 LEUKOCYTOSIS, UNSPECIFIED TYPE: ICD-10-CM

## 2023-10-09 DIAGNOSIS — N18.6 TYPE 2 DIABETES MELLITUS WITH END-STAGE RENAL DISEASE (HCC): ICD-10-CM

## 2023-10-09 DIAGNOSIS — E78.2 MIXED DIABETIC HYPERLIPIDEMIA ASSOCIATED WITH TYPE 2 DIABETES MELLITUS (HCC): ICD-10-CM

## 2023-10-09 DIAGNOSIS — E11.22 TYPE 2 DIABETES MELLITUS WITH ESRD (END-STAGE RENAL DISEASE) (HCC): Primary | ICD-10-CM

## 2023-10-09 DIAGNOSIS — E11.69 MIXED DIABETIC HYPERLIPIDEMIA ASSOCIATED WITH TYPE 2 DIABETES MELLITUS (HCC): ICD-10-CM

## 2023-10-09 DIAGNOSIS — D63.1 ANEMIA, CHRONIC RENAL FAILURE, STAGE 5 (HCC): ICD-10-CM

## 2023-10-09 DIAGNOSIS — E03.9 ACQUIRED HYPOTHYROIDISM: ICD-10-CM

## 2023-10-09 DIAGNOSIS — Z13.0 SCREENING FOR DEFICIENCY ANEMIA: ICD-10-CM

## 2023-10-09 DIAGNOSIS — G47.09 OTHER INSOMNIA: ICD-10-CM

## 2023-10-09 DIAGNOSIS — N18.5 ANEMIA, CHRONIC RENAL FAILURE, STAGE 5 (HCC): ICD-10-CM

## 2023-10-09 PROCEDURE — 3074F SYST BP LT 130 MM HG: CPT | Performed by: INTERNAL MEDICINE

## 2023-10-09 PROCEDURE — 3078F DIAST BP <80 MM HG: CPT | Performed by: INTERNAL MEDICINE

## 2023-10-09 PROCEDURE — 99214 OFFICE O/P EST MOD 30 MIN: CPT | Performed by: INTERNAL MEDICINE

## 2023-10-09 PROCEDURE — 3044F HG A1C LEVEL LT 7.0%: CPT | Performed by: INTERNAL MEDICINE

## 2023-10-09 RX ORDER — LEVOTHYROXINE SODIUM 112 UG/1
112 TABLET ORAL DAILY
Qty: 90 TABLET | Refills: 1 | Status: SHIPPED | OUTPATIENT
Start: 2023-10-09 | End: 2024-04-06

## 2023-10-09 RX ORDER — ROSUVASTATIN CALCIUM 20 MG/1
20 TABLET, COATED ORAL DAILY
Qty: 90 TABLET | Refills: 1 | Status: SHIPPED | OUTPATIENT
Start: 2023-10-09 | End: 2024-04-06

## 2023-10-09 RX ORDER — TRAZODONE HYDROCHLORIDE 100 MG/1
100 TABLET ORAL NIGHTLY
Qty: 90 TABLET | Refills: 1 | Status: SHIPPED | OUTPATIENT
Start: 2023-10-09 | End: 2024-04-06

## 2023-10-12 DIAGNOSIS — F41.1 GENERALIZED ANXIETY DISORDER: Primary | ICD-10-CM

## 2023-10-12 RX ORDER — CLONAZEPAM 0.5 MG/1
0.5 TABLET ORAL DAILY PRN
Qty: 14 TABLET | Refills: 0 | Status: SHIPPED | OUTPATIENT
Start: 2023-10-12 | End: 2023-10-26

## 2023-10-12 RX ORDER — HYDROXYZINE HYDROCHLORIDE 25 MG/1
25 TABLET, FILM COATED ORAL 3 TIMES DAILY PRN
Qty: 60 TABLET | Refills: 1 | Status: SHIPPED | OUTPATIENT
Start: 2023-10-12 | End: 2023-11-11

## 2023-10-12 NOTE — TELEPHONE ENCOUNTER
Pt is getting evicted. Very stressful time for the family. High anxiety level which affects her function. Will try PRN Atarax and PRN Klonopin - short term supply.

## 2023-10-13 ENCOUNTER — TELEPHONE (OUTPATIENT)
Dept: PSYCHIATRY | Age: 63
End: 2023-10-13

## 2023-10-13 ENCOUNTER — TELEPHONE (OUTPATIENT)
Dept: PODIATRY | Facility: CLINIC | Age: 63
End: 2023-10-13

## 2023-10-13 NOTE — TELEPHONE ENCOUNTER
Caller: ILEANA STEVEN    Relationship to Patient: the patient    Phone Number: [phone] (home)     Reason for Call:   PATIENT STATES SHE IS NEEDING OFFICE VISIT NOTES ABOUT PATIENTS NEUROPATHY SENT TO [facility] FAX: [phone]  PLEASE CALL PATIENT ONCE COMPLETED   PLEASE MAKE SURE FRONT IS LARGE ENOUGH TO FIT PAGE LAST TIME OFFICE NOTES WAS FAXED IT WAS TOO SMALL TO BE READ.

## 2023-10-13 NOTE — TELEPHONE ENCOUNTER
Called pt for appointment reminder.   -Pt confirmed    Electronically signed by Coleen Monroe MA on 10/13/2023 at 11:08 AM

## 2023-10-16 ENCOUNTER — TELEPHONE (OUTPATIENT)
Dept: PODIATRY | Facility: CLINIC | Age: 63
End: 2023-10-16
Payer: MEDICARE

## 2023-10-16 ENCOUNTER — OFFICE VISIT (OUTPATIENT)
Dept: PSYCHIATRY | Age: 63
End: 2023-10-16
Payer: MEDICARE

## 2023-10-16 VITALS
TEMPERATURE: 97.6 F | HEIGHT: 65 IN | OXYGEN SATURATION: 100 % | SYSTOLIC BLOOD PRESSURE: 151 MMHG | HEART RATE: 66 BPM | BODY MASS INDEX: 48.82 KG/M2 | WEIGHT: 293 LBS | DIASTOLIC BLOOD PRESSURE: 70 MMHG

## 2023-10-16 DIAGNOSIS — F33.0 MAJOR DEPRESSIVE DISORDER, RECURRENT, MILD (HCC): Primary | ICD-10-CM

## 2023-10-16 DIAGNOSIS — F41.1 GENERALIZED ANXIETY DISORDER: ICD-10-CM

## 2023-10-16 DIAGNOSIS — G47.00 INSOMNIA, UNSPECIFIED TYPE: ICD-10-CM

## 2023-10-16 PROCEDURE — 99214 OFFICE O/P EST MOD 30 MIN: CPT | Performed by: PSYCHIATRY & NEUROLOGY

## 2023-10-16 PROCEDURE — 3077F SYST BP >= 140 MM HG: CPT | Performed by: PSYCHIATRY & NEUROLOGY

## 2023-10-16 PROCEDURE — G8428 CUR MEDS NOT DOCUMENT: HCPCS | Performed by: PSYCHIATRY & NEUROLOGY

## 2023-10-16 PROCEDURE — G8484 FLU IMMUNIZE NO ADMIN: HCPCS | Performed by: PSYCHIATRY & NEUROLOGY

## 2023-10-16 PROCEDURE — 3078F DIAST BP <80 MM HG: CPT | Performed by: PSYCHIATRY & NEUROLOGY

## 2023-10-16 PROCEDURE — 3017F COLORECTAL CA SCREEN DOC REV: CPT | Performed by: PSYCHIATRY & NEUROLOGY

## 2023-10-16 PROCEDURE — 1036F TOBACCO NON-USER: CPT | Performed by: PSYCHIATRY & NEUROLOGY

## 2023-10-16 PROCEDURE — G8417 CALC BMI ABV UP PARAM F/U: HCPCS | Performed by: PSYCHIATRY & NEUROLOGY

## 2023-10-16 ASSESSMENT — PATIENT HEALTH QUESTIONNAIRE - PHQ9
8. MOVING OR SPEAKING SO SLOWLY THAT OTHER PEOPLE COULD HAVE NOTICED. OR THE OPPOSITE, BEING SO FIGETY OR RESTLESS THAT YOU HAVE BEEN MOVING AROUND A LOT MORE THAN USUAL: 0
10. IF YOU CHECKED OFF ANY PROBLEMS, HOW DIFFICULT HAVE THESE PROBLEMS MADE IT FOR YOU TO DO YOUR WORK, TAKE CARE OF THINGS AT HOME, OR GET ALONG WITH OTHER PEOPLE: 2
5. POOR APPETITE OR OVEREATING: 1
9. THOUGHTS THAT YOU WOULD BE BETTER OFF DEAD, OR OF HURTING YOURSELF: 0
SUM OF ALL RESPONSES TO PHQ QUESTIONS 1-9: 10
SUM OF ALL RESPONSES TO PHQ QUESTIONS 1-9: 10
SUM OF ALL RESPONSES TO PHQ9 QUESTIONS 1 & 2: 2
2. FEELING DOWN, DEPRESSED OR HOPELESS: 1
3. TROUBLE FALLING OR STAYING ASLEEP: 1
6. FEELING BAD ABOUT YOURSELF - OR THAT YOU ARE A FAILURE OR HAVE LET YOURSELF OR YOUR FAMILY DOWN: 2
7. TROUBLE CONCENTRATING ON THINGS, SUCH AS READING THE NEWSPAPER OR WATCHING TELEVISION: 1
SUM OF ALL RESPONSES TO PHQ QUESTIONS 1-9: 10
4. FEELING TIRED OR HAVING LITTLE ENERGY: 3
1. LITTLE INTEREST OR PLEASURE IN DOING THINGS: 1
SUM OF ALL RESPONSES TO PHQ QUESTIONS 1-9: 10

## 2023-10-25 ENCOUNTER — LAB (OUTPATIENT)
Dept: LAB | Facility: HOSPITAL | Age: 63
End: 2023-10-25
Payer: MEDICARE

## 2023-10-25 DIAGNOSIS — E11.69 MIXED DIABETIC HYPERLIPIDEMIA ASSOCIATED WITH TYPE 2 DIABETES MELLITUS: ICD-10-CM

## 2023-10-25 DIAGNOSIS — E11.22 TYPE 2 DIABETES MELLITUS WITH ESRD (END-STAGE RENAL DISEASE): ICD-10-CM

## 2023-10-25 DIAGNOSIS — E78.2 MIXED DIABETIC HYPERLIPIDEMIA ASSOCIATED WITH TYPE 2 DIABETES MELLITUS: ICD-10-CM

## 2023-10-25 DIAGNOSIS — E53.8 B12 DEFICIENCY: ICD-10-CM

## 2023-10-25 DIAGNOSIS — I10 ESSENTIAL HYPERTENSION: ICD-10-CM

## 2023-10-25 DIAGNOSIS — E03.9 ACQUIRED HYPOTHYROIDISM: ICD-10-CM

## 2023-10-25 DIAGNOSIS — E55.9 VITAMIN D DEFICIENCY: ICD-10-CM

## 2023-10-25 DIAGNOSIS — N18.6 TYPE 2 DIABETES MELLITUS WITH ESRD (END-STAGE RENAL DISEASE): ICD-10-CM

## 2023-10-25 LAB
ALBUMIN SERPL-MCNC: 3.9 G/DL (ref 3.5–5)
ALBUMIN UR-MCNC: 25.5 MG/DL
ALBUMIN/GLOB SERPL: 1.2 G/DL (ref 1.1–2.5)
ALP SERPL-CCNC: 105 U/L (ref 24–120)
ALT SERPL W P-5'-P-CCNC: 17 U/L (ref 0–35)
ANION GAP SERPL CALCULATED.3IONS-SCNC: 11 MMOL/L (ref 4–13)
AST SERPL-CCNC: 17 U/L (ref 7–45)
AUTO MIXED CELLS #: 0.8 10*3/MM3 (ref 0.1–2.6)
AUTO MIXED CELLS %: 6.8 % (ref 0.1–24)
BILIRUB SERPL-MCNC: 0.5 MG/DL (ref 0.1–1)
BUN SERPL-MCNC: 43 MG/DL (ref 5–21)
BUN/CREAT SERPL: 4.6
CALCIUM SPEC-SCNC: 9.5 MG/DL (ref 8.4–10.4)
CHLORIDE SERPL-SCNC: 96 MMOL/L (ref 98–110)
CHOLEST SERPL-MCNC: 83 MG/DL (ref 130–200)
CO2 SERPL-SCNC: 29 MMOL/L (ref 24–31)
CREAT SERPL-MCNC: 9.27 MG/DL (ref 0.5–1.4)
CREAT UR-MCNC: 58.8 MG/DL
EGFRCR SERPLBLD CKD-EPI 2021: 4.4 ML/MIN/1.73
ERYTHROCYTE [DISTWIDTH] IN BLOOD BY AUTOMATED COUNT: 16.9 % (ref 12.3–15.4)
GLOBULIN UR ELPH-MCNC: 3.2 GM/DL
GLUCOSE SERPL-MCNC: 75 MG/DL (ref 70–100)
HBA1C MFR BLD: 6.2 % (ref 4.8–5.9)
HCT VFR BLD AUTO: 30.6 % (ref 34–46.6)
HDLC SERPL-MCNC: 30 MG/DL
HGB BLD-MCNC: 10 G/DL (ref 12–15.9)
LDLC SERPL CALC-MCNC: 17 MG/DL (ref 0–99)
LDLC/HDLC SERPL: 0.21 {RATIO}
LYMPHOCYTES # BLD AUTO: 1.9 10*3/MM3 (ref 0.7–3.1)
LYMPHOCYTES NFR BLD AUTO: 16.4 % (ref 19.6–45.3)
MCH RBC QN AUTO: 33.4 PG (ref 26.6–33)
MCHC RBC AUTO-ENTMCNC: 32.7 G/DL (ref 31.5–35.7)
MCV RBC AUTO: 102.3 FL (ref 79–97)
MICROALBUMIN/CREAT UR: 433.7 MG/G (ref 0–29)
NEUTROPHILS NFR BLD AUTO: 76.8 % (ref 42.7–76)
NEUTROPHILS NFR BLD AUTO: 8.7 10*3/MM3 (ref 1.7–7)
PLATELET # BLD AUTO: 220 10*3/MM3 (ref 140–450)
PMV BLD AUTO: 8.9 FL (ref 6–12)
POTASSIUM SERPL-SCNC: 3.9 MMOL/L (ref 3.5–5.3)
PROT SERPL-MCNC: 7.1 G/DL (ref 6.3–8.7)
RBC # BLD AUTO: 2.99 10*6/MM3 (ref 3.77–5.28)
SODIUM SERPL-SCNC: 136 MMOL/L (ref 135–145)
TRIGL SERPL-MCNC: 234 MG/DL (ref 0–149)
TSH SERPL DL<=0.05 MIU/L-ACNC: 2.96 UIU/ML (ref 0.27–4.2)
VIT B12 BLD-MCNC: 1138 PG/ML (ref 211–946)
VLDLC SERPL-MCNC: 36 MG/DL (ref 5–40)
WBC NRBC COR # BLD: 11.4 10*3/MM3 (ref 3.4–10.8)

## 2023-10-25 PROCEDURE — 82607 VITAMIN B-12: CPT

## 2023-10-25 PROCEDURE — 82043 UR ALBUMIN QUANTITATIVE: CPT

## 2023-10-25 PROCEDURE — 80061 LIPID PANEL: CPT

## 2023-10-25 PROCEDURE — 83036 HEMOGLOBIN GLYCOSYLATED A1C: CPT

## 2023-10-25 PROCEDURE — 80053 COMPREHEN METABOLIC PANEL: CPT

## 2023-10-25 PROCEDURE — 36415 COLL VENOUS BLD VENIPUNCTURE: CPT

## 2023-10-25 PROCEDURE — 84443 ASSAY THYROID STIM HORMONE: CPT

## 2023-10-25 PROCEDURE — 85025 COMPLETE CBC W/AUTO DIFF WBC: CPT

## 2023-10-25 PROCEDURE — 82570 ASSAY OF URINE CREATININE: CPT

## 2023-11-07 ENCOUNTER — OFFICE VISIT (OUTPATIENT)
Dept: PRIMARY CARE CLINIC | Age: 63
End: 2023-11-07
Payer: MEDICAID

## 2023-11-07 VITALS
HEIGHT: 65 IN | OXYGEN SATURATION: 97 % | HEART RATE: 78 BPM | RESPIRATION RATE: 20 BRPM | WEIGHT: 293 LBS | TEMPERATURE: 97.2 F | SYSTOLIC BLOOD PRESSURE: 128 MMHG | BODY MASS INDEX: 48.82 KG/M2 | DIASTOLIC BLOOD PRESSURE: 62 MMHG

## 2023-11-07 DIAGNOSIS — E66.8 EXTREME OBESITY: ICD-10-CM

## 2023-11-07 DIAGNOSIS — E53.8 B12 DEFICIENCY: ICD-10-CM

## 2023-11-07 PROCEDURE — 3078F DIAST BP <80 MM HG: CPT | Performed by: INTERNAL MEDICINE

## 2023-11-07 PROCEDURE — 99213 OFFICE O/P EST LOW 20 MIN: CPT | Performed by: INTERNAL MEDICINE

## 2023-11-07 PROCEDURE — 3074F SYST BP LT 130 MM HG: CPT | Performed by: INTERNAL MEDICINE

## 2023-11-07 RX ORDER — CYANOCOBALAMIN 1000 UG/ML
1000 INJECTION, SOLUTION INTRAMUSCULAR; SUBCUTANEOUS ONCE
Status: COMPLETED | OUTPATIENT
Start: 2023-11-07 | End: 2023-11-07

## 2023-11-07 RX ADMIN — CYANOCOBALAMIN 1000 MCG: 1000 INJECTION, SOLUTION INTRAMUSCULAR; SUBCUTANEOUS at 12:49

## 2023-11-07 NOTE — PROGRESS NOTES
Casey Mckay ( 1960) is a 61 y.o. female,  Established , here for evaluation of the following chief complaint(s). Weight Management (Wants referral to bariatrics)      Patient was encouraged and advised to be compliant with all  medications leads an active lifestyle and promote maintaining a healthy weight, encouraged not to use cigarettes, laboratory results discussed and reviewed with patient's during this visit   ASSESSMENT/PLAN:  Problem List          Other    Extreme obesity      Uncontrolled, patient is requesting a referral for Bariatric surgery         Relevant Medications    Insulin Regular Human (HUMULIN R U-500 KWIKPEN SC)    Semaglutide,0.25 or 0.5MG/DOS, (OZEMPIC, 0.25 OR 0.5 MG/DOSE,) 2 MG/1.5ML SOPN    B12 deficiency             2022    11:26 AM   SPENSER 7 SCORE   SPENSER-7 Total Score 9           10/16/2023    11:26 AM 2023     1:39 PM 2023     8:33 AM 3/13/2023    12:16 PM 3/2/2023    10:15 AM   PHQ Scores   PHQ2 Score 2 2 4 3 2   PHQ9 Score 10 9 17 13 11       Results for orders placed or performed during the hospital encounter of 23   CBC with Auto Differential   Result Value Ref Range    WBC 11.16 (H) 3.98 - 10.04 K/uL    RBC 3.04 (L) 3.93 - 5.22 M/uL    Hemoglobin 10.8 (L) 11.2 - 15.7 g/dL    Hematocrit 35.0 34.1 - 44.9 %    .1 (H) 79.4 - 94.8 fL    MCH 35.5 (H) 25.6 - 32.2 pg    MCHC 30.9 (L) 32.3 - 35.5 g/dL    RDW 18.1 (H) 11.7 - 14.4 %    Platelets 409 583 - 998 K/uL    MPV 9.4 7.4 - 10.4 fL    Neutrophils % 78.0 (H) 34.0 - 71.1 %    Lymphocytes % 14.1 (L) 19.3 - 53.1 %    Monocytes % 5.1 4.7 - 12.5 %    Eosinophils % 1.7 0.7 - 7.0 %    Basophils % 0.5 0.1 - 1.2 %    Neutrophils Absolute 8.70 (H) 1.56 - 6.13 K/uL    Lymphocytes Absolute 1.57 1.18 - 3.74 K/uL    Monocytes Absolute 0.57 0.24 - 0.82 K/uL    Eosinophils Absolute 0.19 0.04 - 0.54 K/uL    Basophils Absolute 0.06 0.01 - 0.08 K/uL       No follow-ups on file.     HPI  61 y.o. female   Patient Active

## 2023-11-08 PROBLEM — E66.8 EXTREME OBESITY: Status: ACTIVE | Noted: 2023-03-30

## 2023-11-08 PROBLEM — E66.9 EXTREME OBESITY: Status: ACTIVE | Noted: 2023-03-30

## 2023-11-27 ENCOUNTER — TELEPHONE (OUTPATIENT)
Dept: BARIATRICS/WEIGHT MGMT | Facility: CLINIC | Age: 63
End: 2023-11-27
Payer: MEDICARE

## 2023-11-27 NOTE — TELEPHONE ENCOUNTER
Patient was called to remind them of their new patient class and to bring completed paperwork, arrive 30mins early or if paperwork is not completed arrive 60 minutes early, also they were instructed to go to [place] [id number] to sign up for the patient portal. Patient was also informed that we will do a glucose finger stick at [facility]. The patient was advised this is a long appointment so expect to be here at least 2 hours. The patient was agreeable and voiced an understanding.       Has not received her ppw but will arrive at 8:30 to complete in the office.

## 2023-11-28 ENCOUNTER — OFFICE VISIT (OUTPATIENT)
Dept: BARIATRICS/WEIGHT MGMT | Facility: CLINIC | Age: 63
End: 2023-11-28
Payer: MEDICARE

## 2023-11-28 VITALS
SYSTOLIC BLOOD PRESSURE: 191 MMHG | WEIGHT: 293 LBS | BODY MASS INDEX: 48.82 KG/M2 | DIASTOLIC BLOOD PRESSURE: 84 MMHG | TEMPERATURE: 98 F | HEART RATE: 81 BPM | OXYGEN SATURATION: 98 % | HEIGHT: 65 IN

## 2023-11-28 DIAGNOSIS — E11.22 TYPE 2 DIABETES MELLITUS WITH ESRD (END-STAGE RENAL DISEASE): ICD-10-CM

## 2023-11-28 DIAGNOSIS — I10 ESSENTIAL HYPERTENSION: ICD-10-CM

## 2023-11-28 DIAGNOSIS — E66.01 CLASS 3 SEVERE OBESITY DUE TO EXCESS CALORIES WITH SERIOUS COMORBIDITY AND BODY MASS INDEX (BMI) OF 50.0 TO 59.9 IN ADULT: Primary | ICD-10-CM

## 2023-11-28 DIAGNOSIS — N18.6 TYPE 2 DIABETES MELLITUS WITH ESRD (END-STAGE RENAL DISEASE): ICD-10-CM

## 2023-11-28 PROCEDURE — 1160F RVW MEDS BY RX/DR IN RCRD: CPT | Performed by: NURSE PRACTITIONER

## 2023-11-28 PROCEDURE — 99214 OFFICE O/P EST MOD 30 MIN: CPT | Performed by: NURSE PRACTITIONER

## 2023-11-28 PROCEDURE — 3079F DIAST BP 80-89 MM HG: CPT | Performed by: NURSE PRACTITIONER

## 2023-11-28 PROCEDURE — 1159F MED LIST DOCD IN RCRD: CPT | Performed by: NURSE PRACTITIONER

## 2023-11-28 PROCEDURE — 3077F SYST BP >= 140 MM HG: CPT | Performed by: NURSE PRACTITIONER

## 2023-11-28 RX ORDER — LOSARTAN POTASSIUM 50 MG/1
1 TABLET ORAL DAILY
COMMUNITY
Start: 2023-11-22

## 2023-11-28 RX ORDER — TRAZODONE HYDROCHLORIDE 100 MG/1
1 TABLET ORAL NIGHTLY
COMMUNITY
Start: 2023-10-09 | End: 2024-04-07

## 2023-11-28 RX ORDER — CARVEDILOL 3.12 MG/1
1 TABLET ORAL 2 TIMES DAILY
COMMUNITY
Start: 2023-08-21

## 2023-11-28 RX ORDER — CLONAZEPAM 0.5 MG/1
0.5 TABLET ORAL
COMMUNITY
Start: 2023-10-12

## 2023-11-28 RX ORDER — HYDROXYZINE HYDROCHLORIDE 25 MG/1
25 TABLET, FILM COATED ORAL
COMMUNITY
Start: 2023-10-12

## 2023-11-28 RX ORDER — ONDANSETRON 4 MG/1
4 TABLET, ORALLY DISINTEGRATING ORAL
COMMUNITY

## 2023-11-28 NOTE — PROGRESS NOTES
Patient Care Team:  Fatmata Wallace MD as PCP - General (Internal Medicine)  Beverly England (Inactive) as Technician  Beverly England (Inactive) as Technician  Beverly England (Inactive) as Technician  Gregorio Davis MD as Consulting Physician (Nephrology)  Sola Mallory APRN as Nurse Practitioner (Hematology and Oncology)  Wilman Negrete MD as Consulting Physician (Endocrinology)  Garrett Denton MD as Consulting Physician (Otolaryngology)  Vicky Terrell APRN as Nurse Practitioner (Nurse Practitioner)  Kp Vo OD as Consulting Physician (Optometry)  Mono Linder MD as Consulting Physician (Gastroenterology)      Subjective     Patient is a 63 y.o. female presents with morbid obesity and her Body mass index is 51.72 kg/m².     Patient was referred to our practice by PCP.  She is here for discussion of medical weight loss options. She stated she has been with the disease of obesity for more than 10 years. She stated she suffers from Hypertension and Hyperlipidemia , chronic kidney diseaes and morbid obesity due to her weight gain.  She stated that weight loss helps alleviate these symptoms.   She has tried calorie counting  and beein in our program in the past. She stated that she has attempted these conservative methods for weight loss without maintaining long term success.        Review of Systems   Constitutional: Negative.  Positive for fatigue.   HENT:  Positive for hearing loss.    Respiratory: Negative.     Cardiovascular: Negative.    Gastrointestinal: Negative.    Endocrine: Negative.    Musculoskeletal: Negative.  Positive for arthralgias and myalgias.   Psychiatric/Behavioral: Negative.  Positive for sleep disturbance. The patient is nervous/anxious.         History  Past Medical History:   Diagnosis Date    Acquired hypothyroidism 02/06/2017    Allergic rhinitis     Anemia due to stage 4 chronic kidney disease 08/18/2020    Anxiety     Arthritis      Carpal tunnel syndrome 07/2023    Cellulitis     CHF (congestive heart failure)     Chronic kidney disease     3rd stage    Depression     Dialysis patient     AV fistula in Left arm    Dyslipidemia     Elevated cholesterol     Essential hypertension     Fatigue     Gallbladder abscess     Hearing loss     History of transfusion     Insulin pump in place     Omnipod    Light headed     Limited range of motion (ROM) of shoulder     right    Obesity     Obstructive sleep apnea treated with continuous positive airway pressure (CPAP)     Palpitation     Short of breath on exertion     Sinusitis     Stage 4 chronic kidney disease 09/16/2020    Type 2 diabetes mellitus     Type II diabetes mellitus, uncontrolled     Wears glasses       Past Surgical History:   Procedure Laterality Date    ADENOIDECTOMY      ANAL FISTULA REPAIR Left     x 2     ARTERIOVENOUS FISTULA Left 08/2021    CARPAL TUNNEL RELEASE Left 05/24/2023    Procedure: LEFT OPEN CARPAL TUNNEL RELEASE;  Surgeon: Juno Ledbetter MD;  Location: [facility] OR;  Service: Orthopedics;  Laterality: Left;    CARPAL TUNNEL RELEASE Right 8/2/2023    Procedure: RIGHT OPEN CARPAL TUNNEL RELEASE;  Surgeon: Juno Ledbetter MD;  Location: [facility] OR;  Service: Orthopedics;  Laterality: Right;    CHOLECYSTECTOMY      COLONOSCOPY  01/02/2014    COLONOSCOPY N/A 03/22/2017    Procedure: COLONOSCOPY WITH ANESTHESIA;  Surgeon: Mono Linder MD;  Location: [facility] ENDOSCOPY;  Service:     COLONOSCOPY N/A 05/09/2022    Procedure: COLONOSCOPY WITH ANESTHESIA;  Surgeon: Mono Linder MD;  Location: [facility] ENDOSCOPY;  Service: Gastroenterology;  Laterality: N/A;  pre polyp;brbpr  post  Dr. Soliman    D & C HYSTEROSCOPY N/A 07/25/2018    Procedure: DILATATION AND CURETTAGE HYSTEROSCOPY;  Surgeon: Michael Castaneda MD;  Location: [facility] OR;  Service: Obstetrics/Gynecology    DILATATION AND CURETTAGE      X 2    ENDOSCOPY      ENDOSCOPY N/A 05/09/2022    Procedure:  ESOPHAGOGASTRODUODENOSCOPY WITH ANESTHESIA;  Surgeon: Mono Linder MD;  Location: [facility] ENDOSCOPY;  Service: Gastroenterology;  Laterality: N/A;  pre screen  post gastric polyps  Dr. Soliman    ENDOSCOPY N/A 11/28/2022    Procedure: ESOPHAGOGASTRODUODENOSCOPY WITH ANESTHESIA;  Surgeon: Mono Linder MD;  Location: [facility] ENDOSCOPY;  Service: Gastroenterology;  Laterality: N/A;  pre: hx gastric polyp  post: Retained food, gastritis  dr sebastián soliman    ENDOSCOPY N/A 12/29/2022    Procedure: ESOPHAGOGASTRODUODENOSCOPY;  Surgeon: Mono Linder MD;  Location: [facility] ENDOSCOPY;  Service: Gastroenterology;  Laterality: N/A;  preop; hx of polyps  postop gastric polyps  Bette Coffman MD    ENDOSCOPY N/A 06/07/2023    Procedure: ESOPHAGOGASTRODUODENOSCOPY;  Surgeon: Mono Linder MD;  Location: [facility] ENDOSCOPY;  Service: Gastroenterology;  Laterality: N/A;  pre hx gastric polyp  post normal  dr bette brewer    HYSTERECTOMY      TONSILLECTOMY        Social History     Socioeconomic History    Marital status:    Tobacco Use    Smoking status: Never    Smokeless tobacco: Never   Vaping Use    Vaping Use: Never used   Substance and Sexual Activity    Alcohol use: No    Drug use: No    Sexual activity: Defer     Birth control/protection: Post-menopausal, Hysterectomy      Family History   Problem Relation Age of Onset    Diabetes Other     Heart failure Other     Cancer Other     Kidney disease Other     Lung cancer Mother     Heart disease Father     Diabetes Father     Obesity Father     Stroke Father     Prostate cancer Father     Cancer Maternal Grandmother     Uterine cancer Maternal Grandmother     Diabetes Maternal Grandfather     Cancer Paternal Grandmother     Colon cancer Paternal Grandmother     Diabetes Paternal Grandfather     Heart disease Paternal Grandfather     No Known Problems Sister     No Known Problems Brother     No Known Problems Daughter     No Known Problems  Maternal Aunt     No Known Problems Paternal Aunt     BRCA 1/2 Neg Hx     Breast cancer Neg Hx     Endometrial cancer Neg Hx     Ovarian cancer Neg Hx       Allergies   Allergen Reactions    Codeine Nausea And Vomiting and GI Intolerance          Current Outpatient Medications:     allopurinol (ZYLOPRIM) 100 MG tablet, Take 1 tablet by mouth Daily., Disp: 30 tablet, Rfl: 11    Ascorbic Acid (VITAMIN C PO), Take 1 tablet by mouth Daily., Disp: , Rfl:     aspirin 81 MG tablet, Take 1 tablet by mouth Daily., Disp: , Rfl:     calcitriol (ROCALTROL) 0.5 MCG capsule, Take 1 capsule by mouth Daily., Disp: , Rfl:     carvedilol (COREG) 3.125 MG tablet, Take 1 tablet by mouth 2 (Two) Times a Day., Disp: , Rfl:     cetirizine (zyrTEC) 10 MG tablet, Take 1 tablet by mouth., Disp: , Rfl:     citalopram (CeleXA) 40 MG tablet, Take 1 tablet by mouth Daily., Disp: 30 tablet, Rfl: 0    clonazePAM (KlonoPIN) 0.5 MG tablet, Take 1 tablet by mouth., Disp: , Rfl:     Continuous Blood Gluc Sensor (FreeStyle Filippo 2 Sensor) misc, 1 each Every 14 (Fourteen) Days. Left arm, Disp: 2 each, Rfl: 3    Docusate Calcium (STOOL SOFTENER PO), Take 1 tablet by mouth 2 (Two) Times a Day As Needed (constipation)., Disp: , Rfl:     Epoetin Amadeo-epbx (Retacrit) 23582 UNIT/ML injection, Inject 1 mL under the skin into the appropriate area as directed 3 (Three) Times a Week. 16ml each, Disp: , Rfl:     famotidine (PEPCID) 20 MG tablet, Take 2 tablets by mouth 2 (Two) Times a Day., Disp: 120 tablet, Rfl: 11    fluticasone (FLONASE) 50 MCG/ACT nasal spray, 1 spray into the nostril(s) as directed by provider 2 (Two) Times a Day., Disp: , Rfl:     Heparin Sod, Pork, Lock Flush (Heparin Na, Pork, Lock Flsh PF) 1 UNIT/ML solution lock flush, 6,000 mL by Intracatheter route Every 8 (Eight) Hours. Administers before every dialysis treatment, which is 4-5 times a week at home, Disp: , Rfl:     HUMULIN R 500 UNIT/ML CONCENTRATED injection, INJECT UNDER THE SKIN  INTO THE APPROPRIATE AREA 3 TIMES A DAY BEFORE MEALS, Disp: 40 mL, Rfl: 11    hydrOXYzine (ATARAX) 25 MG tablet, Take 1 tablet by mouth., Disp: , Rfl:     Insulin Disposable Pump (Omnipod 5 G6 Pod, Gen 5,) misc, 1 each Every Other Day., Disp: 15 each, Rfl: 6    levothyroxine (Synthroid) 112 MCG tablet, Take 1 tablet by mouth Daily., Disp: 30 tablet, Rfl: 11    losartan (COZAAR) 50 MG tablet, Take 1 tablet by mouth Daily., Disp: , Rfl:     Misc. Devices (Bariatric Rollator) misc, Please provide device for patient, Disp: 1 each, Rfl: 0    POTASSIUM PO, Take 1 tablet by mouth Daily., Disp: , Rfl:     Repatha solution prefilled syringe injection, Inject 1 mL under theskin into the appropriate area as directed Every 14 (Fourteen) Days., Disp: 2 mL, Rfl: 4    rosuvastatin (CRESTOR) 20 MG tablet, Take 1 tablet by mouth Every Night., Disp: 30 tablet, Rfl: 0    Semaglutide, 2 MG/DOSE, (Ozempic, 2 MG/DOSE,) 8 MG/3ML solution pen-injector, Inject 2 mg under the skin into the appropriate area as directed 1 (One) Time Per Week. Please cancel Trulicity, Disp: 3 mL, Rfl: 3    sevelamer (RENVELA) 800 MG tablet, Take 4 tablets with each meal and 2 tablets with a snack, Disp: , Rfl:     traZODone (DESYREL) 100 MG tablet, Take 1 tablet by mouth Every Night., Disp: , Rfl:     ondansetron ODT (ZOFRAN-ODT) 4 MG disintegrating tablet, Apply 1 tablet to cheek., Disp: , Rfl:     Objective     Vital Signs  Temp:  [98 °F (36.7 °C)] 98 °F (36.7 °C)  Heart Rate:  [81] 81  BP: (191)/(84) 191/84  Body mass index is 51.72 kg/m².      11/28/23  1047   Weight: (!) 140 kg (308 lb 6.4 oz)            Physical Exam  Vitals reviewed.   Constitutional:       Appearance: She is obese.   Cardiovascular:      Rate and Rhythm: Normal rate and regular rhythm.   Pulmonary:      Effort: Pulmonary effort is normal.   Skin:     General: Skin is warm and dry.   Neurological:      Mental Status: She is alert and oriented to person, place, and time.   Psychiatric:          Mood and Affect: Mood normal.         Behavior: Behavior normal.            Results Review:   I reviewed the patient's new clinical results.      Assessment & Plan   Diagnoses and all orders for this visit:    1. Class 3 severe obesity due to excess calories with serious comorbidity and body mass index (BMI) of 50.0 to 59.9 in adult (Primary)  Assessment & Plan:  Patient's (Body mass index is 51.72 kg/m².) indicates that they are morbidly/severely obese (BMI > 40 or > 35 with obesity - related health condition) with health conditions that include hypertension . Weight is improving with treatment. BMI  is above average; BMI management plan is completed. We discussed portion control and increasing exercise.       2. Essential hypertension        I discussed the patient's findings and my recommendations with patient.         She has been provided a structured dietary regimen based off of her behavior.  I discussed with the patient the etiology of the disease of obesity and the potential comorbid conditions associated with this disease.  She was instructed to follow the dietary regimen and follow-up with our program in 1 month's time with any additional questions as they may arise during this time.  We emphasized on focusing on adequate protein and meals high in fiber as well as adequate hydration that exceed 64 ounces of water daily.    I explained that I anticipate the patient to lose weight prior to his next monthly visit.  I encouraged patient to have a reward day once a month.  Patient will begin GREEN meal plan.      I spent 30 minutes caring for Maria C on this date of service. This time includes time spent by me in the following activities: preparing for the visit, reviewing tests, obtaining and/or reviewing a separately obtained history, performing a medically appropriate examination and/or evaluation, counseling and educating the patient/family/caregiver, ordering medications, tests, or procedures,  documenting information in the medical record and care coordination; discussing further testing.      Patient does dialysis at home, daughter is also in the program with her.   Marta Grewal, APRN   14:57 CST  11/28/23

## 2023-11-28 NOTE — ASSESSMENT & PLAN NOTE
Patient's (Body mass index is 51.72 kg/m².) indicates that they are morbidly/severely obese (BMI > 40 or > 35 with obesity - related health condition) with health conditions that include hypertension . Weight is improving with treatment. BMI  is above average; BMI management plan is completed. We discussed portion control and increasing exercise.

## 2023-12-20 NOTE — PROGRESS NOTES
[facility] - PODIATRY    Today's Date: 12/22/2023     Patient Name: Maria C Shrestha  MRN: [id number]  CSN: [id number]  PCP: Fatmata Wallace MD  Referring Provider: No ref. provider found    SUBJECTIVE     Chief Complaint   Patient presents with    Follow-up     Fatmata Wallace MD-11/28/2023 3 MTH FU DIABETIC- pt states feet doing ok, left great toe is little tinder due to left great nail issues- pt pain 5/10 at worst, left great toe nail     Diabetes     115mg/dl BG this      HPI: Maria C Shrestha, a 63 y.o.female, comes to clinic as a(n) established patient presenting for diabetic foot exam and complaining of toenail/callus issues. Patient has h/o hypothyroidism, anemia, anxiety, arthritis, cellulitis, CKD, depression, thyroid disease, DM, HLD, HTN, fatigue, gallbladder abscess, obesity, . Patient is IDDM with last stated BG level of 115mg/dl. States that glucose is typically well controlled. States that her toenails are long, thickened, and irregular and that she is unable to reach her nails now to care for them herself. Uses wheelchair for long distances. Has CKD and is treated with dialysis. Notes sensation in the feet that feels like she is walking on something/has something attached to feet.  Admits pain at 5/10 level and described as aching, dull, and numbness. Relates previous treatment(s) including care by podiatry . Denies any constitutional symptoms. No other pedal complaints at this time.    Past Medical History:   Diagnosis Date    Acquired hypothyroidism 02/06/2017    Allergic rhinitis     Anemia due to stage 4 chronic kidney disease 08/18/2020    Anxiety     Arthritis     Carpal tunnel syndrome 07/2023    Cellulitis     CHF (congestive heart failure)     Chronic kidney disease     3rd stage    Depression     Dialysis patient     AV fistula in Left arm    Dyslipidemia     Elevated cholesterol     Essential hypertension     Fatigue     Gallbladder abscess     Hearing loss      History of transfusion     Insulin pump in place     Omnipod    Light headed     Limited range of motion (ROM) of shoulder     right    Obesity     Obstructive sleep apnea treated with continuous positive airway pressure (CPAP)     Palpitation     Short of breath on exertion     Sinusitis     Stage 4 chronic kidney disease 09/16/2020    Type 2 diabetes mellitus     Type II diabetes mellitus, uncontrolled     Wears glasses      Past Surgical History:   Procedure Laterality Date    ADENOIDECTOMY      ANAL FISTULA REPAIR Left     x 2     ARTERIOVENOUS FISTULA Left 08/2021    CARPAL TUNNEL RELEASE Left 05/24/2023    Procedure: LEFT OPEN CARPAL TUNNEL RELEASE;  Surgeon: Juno Ledbetter MD;  Location: [facility] OR;  Service: Orthopedics;  Laterality: Left;    CARPAL TUNNEL RELEASE Right 8/2/2023    Procedure: RIGHT OPEN CARPAL TUNNEL RELEASE;  Surgeon: Juno Ledbetter MD;  Location: [facility] OR;  Service: Orthopedics;  Laterality: Right;    CHOLECYSTECTOMY      COLONOSCOPY  01/02/2014    COLONOSCOPY N/A 03/22/2017    Procedure: COLONOSCOPY WITH ANESTHESIA;  Surgeon: Mono Linder MD;  Location: [facility] ENDOSCOPY;  Service:     COLONOSCOPY N/A 05/09/2022    Procedure: COLONOSCOPY WITH ANESTHESIA;  Surgeon: Mono Linder MD;  Location: [facility] ENDOSCOPY;  Service: Gastroenterology;  Laterality: N/A;  pre polyp;brbpr  post  Dr. Soliman    D & C HYSTEROSCOPY N/A 07/25/2018    Procedure: DILATATION AND CURETTAGE HYSTEROSCOPY;  Surgeon: Michael Castaneda MD;  Location: [facility] OR;  Service: Obstetrics/Gynecology    DILATATION AND CURETTAGE      X 2    ENDOSCOPY      ENDOSCOPY N/A 05/09/2022    Procedure: ESOPHAGOGASTRODUODENOSCOPY WITH ANESTHESIA;  Surgeon: Mono Linder MD;  Location: [facility] ENDOSCOPY;  Service: Gastroenterology;  Laterality: N/A;  pre screen  post gastric polyps  Dr. Soliman    ENDOSCOPY N/A 11/28/2022    Procedure: ESOPHAGOGASTRODUODENOSCOPY WITH ANESTHESIA;  Surgeon: Mono Linder MD;  Location:   [facility] ENDOSCOPY;  Service: Gastroenterology;  Laterality: N/A;  pre: hx gastric polyp  post: Retained food, gastritis  dr sebastián nguyne    ENDOSCOPY N/A 12/29/2022    Procedure: ESOPHAGOGASTRODUODENOSCOPY;  Surgeon: Mono Linder MD;  Location:  [facility] ENDOSCOPY;  Service: Gastroenterology;  Laterality: N/A;  preop; hx of polyps  postop gastric polyps  PCP Bette Wallace MD    ENDOSCOPY N/A 06/07/2023    Procedure: ESOPHAGOGASTRODUODENOSCOPY;  Surgeon: Mono Linder MD;  Location:  [facility] ENDOSCOPY;  Service: Gastroenterology;  Laterality: N/A;  pre hx gastric polyp  post normal  dr bette brewer    HYSTERECTOMY      TONSILLECTOMY       Family History   Problem Relation Age of Onset    Diabetes Other     Heart failure Other     Cancer Other     Kidney disease Other     Lung cancer Mother     Heart disease Father     Diabetes Father     Obesity Father     Stroke Father     Prostate cancer Father     Cancer Maternal Grandmother     Uterine cancer Maternal Grandmother     Diabetes Maternal Grandfather     Cancer Paternal Grandmother     Colon cancer Paternal Grandmother     Diabetes Paternal Grandfather     Heart disease Paternal Grandfather     No Known Problems Sister     No Known Problems Brother     No Known Problems Daughter     No Known Problems Maternal Aunt     No Known Problems Paternal Aunt     BRCA 1/2 Neg Hx     Breast cancer Neg Hx     Endometrial cancer Neg Hx     Ovarian cancer Neg Hx      Social History     Socioeconomic History    Marital status:    Tobacco Use    Smoking status: Never    Smokeless tobacco: Never   Vaping Use    Vaping Use: Never used   Substance and Sexual Activity    Alcohol use: No    Drug use: No    Sexual activity: Defer     Birth control/protection: Post-menopausal, Hysterectomy     Allergies   Allergen Reactions    Codeine Nausea And Vomiting and GI Intolerance     Current Outpatient Medications   Medication Sig Dispense Refill    allopurinol (ZYLOPRIM)  100 MG tablet Take 1 tablet by mouth Daily. 30 tablet 11    Ascorbic Acid (VITAMIN C PO) Take 1 tablet by mouth Daily.      aspirin 81 MG tablet Take 1 tablet by mouth Daily.      calcitriol (ROCALTROL) 0.5 MCG capsule Take 1 capsule by mouth Daily.      carvedilol (COREG) 3.125 MG tablet Take 1 tablet by mouth 2 (Two) Times a Day.      cetirizine (zyrTEC) 10 MG tablet Take 1 tablet by mouth.      citalopram (CeleXA) 40 MG tablet Take 1 tablet by mouth Daily. 30 tablet 0    clonazePAM (KlonoPIN) 0.5 MG tablet Take 1 tablet by mouth.      Continuous Blood Gluc Sensor (FreeStyle Filippo 2 Sensor) misc 1 each Every 14 (Fourteen) Days. Left arm 2 each 3    Docusate Calcium (STOOL SOFTENER PO) Take 1 tablet by mouth 2 (Two) Times a Day As Needed (constipation).      Epoetin Amadeo-epbx (Retacrit) 78864 UNIT/ML injection Inject 1 mL under the skin into the appropriate area as directed 3 (Three) Times a Week. 16ml each      famotidine (PEPCID) 20 MG tablet Take 2 tablets by mouth 2 (Two) Times a Day. 120 tablet 11    fluticasone (FLONASE) 50 MCG/ACT nasal spray 1 spray into the nostril(s) as directed by provider 2 (Two) Times a Day.      Heparin Sod, Pork, Lock Flush (Heparin Na, Pork, Lock Flsh PF) 1 UNIT/ML solution lock flush 6,000 mL by Intracatheter route Every 8 (Eight) Hours. Administers before every dialysis treatment, which is 4-5 times a week at home      HUMULIN R 500 UNIT/ML CONCENTRATED injection INJECT UNDER THE SKIN INTO THE APPROPRIATE AREA 3 TIMES A DAY BEFORE MEALS 40 mL 11    hydrOXYzine (ATARAX) 25 MG tablet Take 1 tablet by mouth.      Insulin Disposable Pump (Omnipod 5 G6 Pod, Gen 5,) misc 1 each Every Other Day. 15 each 6    levothyroxine (Synthroid) 112 MCG tablet Take 1 tablet by mouth Daily. 30 tablet 11    losartan (COZAAR) 50 MG tablet Take 1 tablet by mouth Daily.      Misc. Devices (Bariatric Rollator) misc Please provide device for patient 1 each 0    ondansetron ODT (ZOFRAN-ODT) 4 MG  disintegrating tablet Apply 1 tablet to cheek.      POTASSIUM PO Take 1 tablet by mouth Daily.      Repatha solution prefilled syringe injection Inject 1 mL under theskin into the appropriate area as directed Every 14 (Fourteen) Days. 2 mL 4    rosuvastatin (CRESTOR) 20 MG tablet Take 1 tablet by mouth Every Night. 30 tablet 0    Semaglutide, 2 MG/DOSE, (Ozempic, 2 MG/DOSE,) 8 MG/3ML solution pen-injector Inject 2 mg under the skin into the appropriate area as directed 1 (One) Time Per Week. Please cancel Trulicity 3 mL 3    sevelamer (RENVELA) 800 MG tablet Take 4 tablets with each meal and 2 tablets with a snack      traZODone (DESYREL) 100 MG tablet Take 1 tablet by mouth Every Night.       No current facility-administered medications for this visit.     Review of Systems   Constitutional:  Negative for chills and fever.   HENT:  Negative for congestion.    Respiratory:  Negative for shortness of breath.    Cardiovascular:  Negative for chest pain and leg swelling.   Gastrointestinal:  Negative for constipation, diarrhea, nausea and vomiting.   Musculoskeletal:  Positive for arthralgias.        Foot pain   Skin:  Negative for wound.   Neurological:  Positive for numbness.       OBJECTIVE     Vitals:    23 1050   BP: 124/58   Pulse: 84   SpO2: 97%         PHYSICAL EXAM  GEN:   Accompanied by none.     Foot/Ankle Exam    GENERAL  Diabetic foot exam performed    Appearance:  obese  Orientation:  AAOx3  Affect:  appropriate  Assistance:  wheelchair  Right shoe gear: casual shoe  Left shoe gear: casual shoe    VASCULAR     Right Foot Vascularity   Dorsalis pedis:  2+  Posterior tibial:  2+  Skin temperature:  warm  Edema gradin+ and non-pitting  CFT:  3  Pedal hair growth:  Present  Varicosities:  none     Left Foot Vascularity   Dorsalis pedis:  2+  Posterior tibial:  2+  Skin temperature:  warm  Edema gradin+ and non-pitting  CFT:  3  Pedal hair growth:  Present  Varicosities:  none      NEUROLOGIC     Right Foot Neurologic   Light touch sensation: diminished  Vibratory sensation: normal  Hot/Cold sensation: normal  Protective Sensation using Foster-Laura Monofilament:   Sites intact: 10  Sites tested: 10     Left Foot Neurologic   Light touch sensation: diminished  Vibratory sensation: normal  Hot/Cold sensation:  normal  Protective Sensation using Foster-Laura Monofilament:   Sites intact: 10  Sites tested: 10    MUSCULOSKELETAL     Right Foot Musculoskeletal   Ecchymosis:  none  Tenderness:  toenail problem    Arch:  Normal  Hallux valgus: No       Left Foot Musculoskeletal   Ecchymosis:  none  Tenderness:  toenail problem  Arch:  Normal  Hallux valgus: No      MUSCLE STRENGTH     Right Foot Muscle Strength   Foot dorsiflexion:  5  Foot plantar flexion:  5  Foot inversion:  5  Foot eversion:  5     Left Foot Muscle Strength   Foot dorsiflexion:  5  Foot plantar flexion:  5  Foot inversion:  5  Foot eversion:  5    RANGE OF MOTION     Right Foot Range of Motion   Foot and ankle ROM within normal limits       Left Foot Range of Motion   Foot and ankle ROM within normal limits      DERMATOLOGIC      Right Foot Dermatologic   Skin  Right foot skin is intact. Negative for tinea.   Nails  1.  Positive for elongated, onychomycosis, abnormal thickness and subungual debris.  2.  Positive for onychomycosis, abnormal thickness and subungual debris.  3.  Positive for onychomycosis, abnormal thickness and subungual debris.  4.  Positive for onychomycosis, abnormal thickness and subungual debris.  5.  Positive for onychomycosis, abnormal thickness and subungual debris.     Left Foot Dermatologic   Skin  Left foot skin is intact. Negative for tinea.   Nails  1.  Positive for elongated, onychomycosis, abnormal thickness and subungual debris.  2.  Positive for onychomycosis, abnormal thickness and subungual debris.  3.  Positive for onychomycosis, abnormal thickness, subungual debris and dystrophic  nail.  4.  Positive for onychomycosis, abnormally thick and subungual debris.  5.  Positive for onychomycosis, abnormally thick and subungual debris.      RADIOLOGY/NUCLEAR:  No results found.    LABORATORY/CULTURE RESULTS:      PATHOLOGY RESULTS:       ASSESSMENT/PLAN     Diagnoses and all orders for this visit:    1. Onychomycosis (Primary)    2. Controlled type 2 diabetes mellitus with stage 4 chronic kidney disease, without long-term current use of insulin    3. BMI 50.0-59.9, adult    4. Type 2 diabetes mellitus with ESRD (end-stage renal disease)    5. Stage 5 chronic kidney disease on chronic dialysis        Comprehensive lower extremity examination and evaluation was performed.  Discussed findings and treatment plan including risks, benefits, and treatment options with patient in detail. Patient agreed with treatment plan.  After verbal consent obtained, nail(s) x10 debrided of length and thickness with nail nipper without incidence  Patient may maintain nails and calluses at home utilizing emery board or pumice stone between visits as needed  Reviewed at home diabetic foot care including daily foot checks   Continue diabetic monitoring and control under direction of PCP.  An After Visit Summary was printed and given to the patient at discharge, including (if requested) any available informative/educational handouts regarding diagnosis, treatment, or medications. All questions were answered to patient/family satisfaction. Should symptoms fail to improve or worsen they agree to call or return to clinic or to go to the Emergency Department. Discussed the importance of following up with any needed screening tests/labs/specialist appointments and any requested follow-up recommended by me today. Importance of maintaining follow-up discussed and patient accepts that missed appointments can delay diagnosis and potentially lead to worsening of conditions.  Return in about 3 months (around 3/22/2024) for Follow-up  with Podiatry APRN, Schedule [facility]., or sooner if acute issues arise.    Lab Frequency Next Occurrence   Follow Anesthesia Guidelines / Protocol Once 04/20/2022   Obtain Informed Consent Once 04/25/2022   Diet: Once 05/09/2022   Diet: Once 11/28/2022   Follow Anesthesia Guidelines / Protocol Once 12/05/2022   Diet: Once 12/29/2022       This document has been electronically signed by Sadiq Wong DPM on December 22, 2023 11:11 CST

## 2023-12-21 ENCOUNTER — TELEPHONE (OUTPATIENT)
Dept: PODIATRY | Facility: CLINIC | Age: 63
End: 2023-12-21
Payer: MEDICARE

## 2023-12-21 NOTE — TELEPHONE ENCOUNTER
Called patient regarding appt on 12/22/2023. Left message for patient to return call if any questions or concerns arise.

## 2023-12-22 ENCOUNTER — OFFICE VISIT (OUTPATIENT)
Dept: PODIATRY | Facility: CLINIC | Age: 63
End: 2023-12-22
Payer: MEDICARE

## 2023-12-22 VITALS
WEIGHT: 293 LBS | HEIGHT: 65 IN | SYSTOLIC BLOOD PRESSURE: 124 MMHG | BODY MASS INDEX: 48.82 KG/M2 | HEART RATE: 84 BPM | DIASTOLIC BLOOD PRESSURE: 58 MMHG | OXYGEN SATURATION: 97 %

## 2023-12-22 DIAGNOSIS — N18.6 STAGE 5 CHRONIC KIDNEY DISEASE ON CHRONIC DIALYSIS: ICD-10-CM

## 2023-12-22 DIAGNOSIS — Z99.2 STAGE 5 CHRONIC KIDNEY DISEASE ON CHRONIC DIALYSIS: ICD-10-CM

## 2023-12-22 DIAGNOSIS — N18.6 TYPE 2 DIABETES MELLITUS WITH ESRD (END-STAGE RENAL DISEASE): ICD-10-CM

## 2023-12-22 DIAGNOSIS — E11.22 CONTROLLED TYPE 2 DIABETES MELLITUS WITH STAGE 4 CHRONIC KIDNEY DISEASE, WITHOUT LONG-TERM CURRENT USE OF INSULIN: ICD-10-CM

## 2023-12-22 DIAGNOSIS — N18.4 CONTROLLED TYPE 2 DIABETES MELLITUS WITH STAGE 4 CHRONIC KIDNEY DISEASE, WITHOUT LONG-TERM CURRENT USE OF INSULIN: ICD-10-CM

## 2023-12-22 DIAGNOSIS — E11.22 TYPE 2 DIABETES MELLITUS WITH ESRD (END-STAGE RENAL DISEASE): ICD-10-CM

## 2023-12-22 DIAGNOSIS — B35.1 ONYCHOMYCOSIS: Primary | ICD-10-CM

## 2023-12-27 ENCOUNTER — TELEPHONE (OUTPATIENT)
Dept: PSYCHIATRY | Age: 63
End: 2023-12-27

## 2023-12-27 NOTE — TELEPHONE ENCOUNTER
Pt called back to cancel/reschedule appt for 01/15/24 with Dr. Saulo Villegas because the provider will not be in the office that day. Rescheduled for 01/05/24 @ 10:30.     Electronically signed by Jaime Hoyt MA on 12/27/2023 at 3:32 PM

## 2024-01-02 ENCOUNTER — NUTRITION (OUTPATIENT)
Dept: BARIATRICS/WEIGHT MGMT | Facility: CLINIC | Age: 64
End: 2024-01-02
Payer: MEDICARE

## 2024-01-02 ENCOUNTER — OFFICE VISIT (OUTPATIENT)
Dept: BARIATRICS/WEIGHT MGMT | Facility: CLINIC | Age: 64
End: 2024-01-02
Payer: MEDICARE

## 2024-01-02 VITALS
HEIGHT: 65 IN | TEMPERATURE: 97.5 F | BODY MASS INDEX: 48.82 KG/M2 | WEIGHT: 293 LBS | SYSTOLIC BLOOD PRESSURE: 155 MMHG | HEART RATE: 106 BPM | DIASTOLIC BLOOD PRESSURE: 83 MMHG | OXYGEN SATURATION: 97 %

## 2024-01-02 DIAGNOSIS — I10 ESSENTIAL HYPERTENSION: ICD-10-CM

## 2024-01-02 DIAGNOSIS — E66.01 CLASS 3 SEVERE OBESITY DUE TO EXCESS CALORIES WITH SERIOUS COMORBIDITY AND BODY MASS INDEX (BMI) OF 45.0 TO 49.9 IN ADULT: Primary | ICD-10-CM

## 2024-01-02 DIAGNOSIS — N18.6 TYPE 2 DIABETES MELLITUS WITH ESRD (END-STAGE RENAL DISEASE): ICD-10-CM

## 2024-01-02 DIAGNOSIS — E11.22 TYPE 2 DIABETES MELLITUS WITH ESRD (END-STAGE RENAL DISEASE): ICD-10-CM

## 2024-01-02 NOTE — PROGRESS NOTES
"Metabolic and Bariatric Surgery Adult Nutrition Assessment    Patient Name: Maria C Shrestha   YOB: 1960   MRN: [id number]     Assessment Date:  01/02/2024     Reason for Visit: Initial Nutrition Assessment     Treatment Pathway: Medical Weight Loss    Assessment    Anthropometrics   Wt Readings from Last 1 Encounters:   01/02/24 135 kg (297 lb 9.6 oz)     Ht Readings from Last 1 Encounters:   01/02/24 164.5 cm (64.75\")     BMI Readings from Last 1 Encounters:   01/02/24 49.91 kg/m²        Initial Weight/Date: 308.4 lbs (Nov 2023)  Weight Changes since last visit: -10.8 lbs  Net Weight Change: -10.8 lbs    Past Medical History:   Diagnosis Date    Acquired hypothyroidism 02/06/2017    Allergic rhinitis     Anemia due to stage 4 chronic kidney disease 08/18/2020    Anxiety     Arthritis     Carpal tunnel syndrome 07/2023    Cellulitis     CHF (congestive heart failure)     Chronic kidney disease     3rd stage    Depression     Dialysis patient     AV fistula in Left arm    Dyslipidemia     Elevated cholesterol     Essential hypertension     Fatigue     Gallbladder abscess     Hearing loss     History of transfusion     Insulin pump in place     Omnipod    Light headed     Limited range of motion (ROM) of shoulder     right    Obesity     Obstructive sleep apnea treated with continuous positive airway pressure (CPAP)     Palpitation     Short of breath on exertion     Sinusitis     Stage 4 chronic kidney disease 09/16/2020    Type 2 diabetes mellitus     Type II diabetes mellitus, uncontrolled     Wears glasses       Past Surgical History:   Procedure Laterality Date    ADENOIDECTOMY      ANAL FISTULA REPAIR Left     x 2     ARTERIOVENOUS FISTULA Left 08/2021    CARPAL TUNNEL RELEASE Left 05/24/2023    Procedure: LEFT OPEN CARPAL TUNNEL RELEASE;  Surgeon: Juno Ledbetter MD;  Location: [facility];  Service: Orthopedics;  Laterality: Left;    CARPAL TUNNEL RELEASE Right 8/2/2023    Procedure: RIGHT OPEN " CARPAL TUNNEL RELEASE;  Surgeon: Juno Ledbetter MD;  Location: [facility] OR;  Service: Orthopedics;  Laterality: Right;    CHOLECYSTECTOMY      COLONOSCOPY  01/02/2014    COLONOSCOPY N/A 03/22/2017    Procedure: COLONOSCOPY WITH ANESTHESIA;  Surgeon: Mono Linder MD;  Location: [facility] ENDOSCOPY;  Service:     COLONOSCOPY N/A 05/09/2022    Procedure: COLONOSCOPY WITH ANESTHESIA;  Surgeon: Mono Linder MD;  Location: [facility] ENDOSCOPY;  Service: Gastroenterology;  Laterality: N/A;  pre polyp;brbpr  post  Dr. Soliman    D & C HYSTEROSCOPY N/A 07/25/2018    Procedure: DILATATION AND CURETTAGE HYSTEROSCOPY;  Surgeon: Michael Castaneda MD;  Location: [facility] OR;  Service: Obstetrics/Gynecology    DILATATION AND CURETTAGE      X 2    ENDOSCOPY      ENDOSCOPY N/A 05/09/2022    Procedure: ESOPHAGOGASTRODUODENOSCOPY WITH ANESTHESIA;  Surgeon: Mono Linder MD;  Location: [facility] ENDOSCOPY;  Service: Gastroenterology;  Laterality: N/A;  pre screen  post gastric polyps  Dr. Soliman    ENDOSCOPY N/A 11/28/2022    Procedure: ESOPHAGOGASTRODUODENOSCOPY WITH ANESTHESIA;  Surgeon: Mono Linder MD;  Location: [facility] ENDOSCOPY;  Service: Gastroenterology;  Laterality: N/A;  pre: hx gastric polyp  post: Retained food, gastritis  dr sebastián soliman    ENDOSCOPY N/A 12/29/2022    Procedure: ESOPHAGOGASTRODUODENOSCOPY;  Surgeon: Mono Linder MD;  Location: [facility] ENDOSCOPY;  Service: Gastroenterology;  Laterality: N/A;  preop; hx of polyps  postop gastric polyps  Bette Coffman MD    ENDOSCOPY N/A 06/07/2023    Procedure: ESOPHAGOGASTRODUODENOSCOPY;  Surgeon: Mono Linder MD;  Location: [facility] ENDOSCOPY;  Service: Gastroenterology;  Laterality: N/A;  pre hx gastric polyp  post normal  dr bette brewer    HYSTERECTOMY      TONSILLECTOMY        Current Outpatient Medications   Medication Sig Dispense Refill    allopurinol (ZYLOPRIM) 100 MG tablet Take 1 tablet by mouth Daily. 30 tablet 11     calcitriol (ROCALTROL) 0.5 MCG capsule Take 1 capsule by mouth Daily.      carvedilol (COREG) 3.125 MG tablet Take 1 tablet by mouth 2 (Two) Times a Day.      cetirizine (zyrTEC) 10 MG tablet Take 1 tablet by mouth.      citalopram (CeleXA) 40 MG tablet Take 1 tablet by mouth Daily. 30 tablet 0    clonazePAM (KlonoPIN) 0.5 MG tablet Take 1 tablet by mouth.      Continuous Blood Gluc Sensor (FreeStyle Filippo 2 Sensor) misc 1 each Every 14 (Fourteen) Days. Left arm 2 each 3    Epoetin Amadeo-epbx (Retacrit) 67687 UNIT/ML injection Inject 1 mL under the skin into the appropriate area as directed 3 (Three) Times a Week. 16ml each      famotidine (PEPCID) 20 MG tablet Take 2 tablets by mouth 2 (Two) Times a Day. 120 tablet 11    fluticasone (FLONASE) 50 MCG/ACT nasal spray 1 spray into the nostril(s) as directed by provider 2 (Two) Times a Day.      Heparin Sod, Pork, Lock Flush (Heparin Na, Pork, Lock Flsh PF) 1 UNIT/ML solution lock flush 6,000 mL by Intracatheter route Every 8 (Eight) Hours. Administers before every dialysis treatment, which is 4-5 times a week at home      HUMULIN R 500 UNIT/ML CONCENTRATED injection INJECT UNDER THE SKIN INTO THE APPROPRIATE AREA 3 TIMES A DAY BEFORE MEALS 40 mL 11    Insulin Disposable Pump (Omnipod 5 G6 Pod, Gen 5,) mis 1 each Every Other Day. 15 each 6    levothyroxine (Synthroid) 112 MCG tablet Take 1 tablet by mouth Daily. 30 tablet 11    losartan (COZAAR) 50 MG tablet Take 1 tablet by mouth Daily.      Misc. Devices (Bariatric Rollator) misc Please provide device for patient 1 each 0    POTASSIUM PO Take 1 tablet by mouth Daily.      Repatha solution prefilled syringe injection Inject 1 mL under theskin into the appropriate area as directed Every 14 (Fourteen) Days. 2 mL 4    rosuvastatin (CRESTOR) 20 MG tablet Take 1 tablet by mouth Every Night. 30 tablet 0    Semaglutide, 2 MG/DOSE, (Ozempic, 2 MG/DOSE,) 8 MG/3ML solution pen-injector Inject 2 mg under the skin into the  appropriate area as directed 1 (One) Time Per Week. Please cancel Trulicity 3 mL 3    sevelamer (RENVELA) 800 MG tablet Take 4 tablets with each meal and 2 tablets with a snack      traZODone (DESYREL) 100 MG tablet Take 1 tablet by mouth Every Night.       No current facility-administered medications for this visit.      Allergies   Allergen Reactions    Codeine Nausea And Vomiting and GI Intolerance          Motivation for weight loss includes: Wants to be able to be to get a kidney transplant list (needs to be ~190 lbs).     Pertinent Social/Behavior/Environmental History: Has been on home dialysis close to 3 yrs (4:30-9:30 am).    Nutrition Recall  Eating 2-3 meals daily. Food journal reviewed.     Monitoring portions- not consistently using measuring cups. Has cut back on several things, such as eliminating bread.   Calculating Protein- < 65 gm.   Drinking sugary/carbonated beverages- rarely a sprite zero; not usually choosing soda   Fluid Intake- has fluid restriction from nephrologist of 32 oz daily. She states she gets in 32 oz daily.       Exercise: none currently  Recommended increasing physical activity, beyond normal daily habits, gradually working to reach ~30 minutes daily.     Nutrition Intervention  Nutrition education for morbid obesity and nutrition coaching for behavior change provided.  Strategies used included Comprehensive education, Motivational Interviewing , Problem Solving, Skill Development for meal planning, and Ongoing reinforcement  Review of medical weight loss prescription 4 meal/day plan and reviewed nutritional needs for Medical Weight Loss.  Self-monitoring strategies such as keeping a food journal (on paper or electronically) and calculating fluid/protein intake were discussed.    Recommended Diet Changes- Green Prescription Meal Plan  Eat 4 meals per day with protein and vegetables at each meal, no carbs after meal 2., Protein goal: 65 gms., Eat vegetables first at each meal.,  "Discussed protein guidelines for shakes and bars., Reduce snacking -use foods from free foods list only., Reduce fat, sugar, and/or salt in food choices., Choose more nutrient dense foods., Choose foods with increased fiber., Monitor portion sizes using a food scale and/or measuring cup., and Eliminate soda and sugar-sweetened beverages      Goals  Planning meals one day in advance to assist with timing   Increase vegetable intake - we discussed changing to \"Keto\" protein bowl at [employer] in the mornings vs biscuits and adding vegetables. Discussed using [employer] for vegetable + protein smoothies for meal 3.   Eliminate group C in the evening and continue to log meals.      Monitoring/Evaluation Plan  Anticipate follow in 1 months. Continue collaboration of care with physician and treatment team.     Time Spent 15 minutes.    Electronically signed by  Lynda Connell RDN, LD  01/02/2024 11:28 CST.  "

## 2024-01-02 NOTE — PROGRESS NOTES
Patient Care Team:  Fatmata Wallace MD as PCP - General (Internal Medicine)  Beverly England (Inactive) as Technician  Beverly England (Inactive) as Technician  Beverly England (Inactive) as Technician  Gregorio Davis MD as Consulting Physician (Nephrology)  Sola Mallory APRN as Nurse Practitioner (Hematology and Oncology)  Wilman Negrete MD as Consulting Physician (Endocrinology)  Garrett Denton MD as Consulting Physician (Otolaryngology)  Vicky Terrell APRN as Nurse Practitioner (Nurse Practitioner)  Kp Vo OD as Consulting Physician (Optometry)  Mono Linder MD as Consulting Physician (Gastroenterology)    Reason for Visit:  Surgical Weight loss    Subjective   Maria C Shrestha is a 63 y.o. female.     Maria C is here for follow-up and continued medical management of her morbid obesity.  She is currently on a prescription diet.  Maria C previously was to apply dietary changes such as following the meal plan as directed.  Patient admits to eating around 2 and 3 meals per day.  She  admits to drinking at least 32 ounces of fluid each day and states 32 ounces is her restriction from her kidney specialist and intaking under 65 grams of protein..  She  is currently exercising none..  She  states that she has gone with soda intake and states it is rare and chooses sprite zero and denies  nicotine use.  Patient has gained 3  pounds since her last appointment with us.     Review Of Systems:  Review of Systems   Constitutional: Negative.    Respiratory: Negative.     Cardiovascular: Negative.    Gastrointestinal: Negative.    Endocrine: Negative.    Musculoskeletal: Negative.    Psychiatric/Behavioral: Negative.           The following portions of the patient's history were reviewed and updated as appropriate: allergies, current medications, past family history, past medical history, past social history, past surgical history, and problem list.    Objective   BP  "155/83 (BP Location: Right arm, Patient Position: Sitting, Cuff Size: Adult)   Pulse 106   Temp 97.5 °F (36.4 °C)   Ht 164.5 cm (64.75\")   Wt 135 kg (297 lb 9.6 oz)   LMP  (LMP Unknown)   SpO2 97%   BMI 49.91 kg/m²       01/02/24  1032   Weight: 135 kg (297 lb 9.6 oz)            Physical Exam  Vitals reviewed.   Constitutional:       Appearance: She is obese.   Cardiovascular:      Rate and Rhythm: Normal rate and regular rhythm.   Pulmonary:      Effort: Pulmonary effort is normal.   Abdominal:      General: Bowel sounds are normal.      Palpations: Abdomen is soft.   Musculoskeletal:         General: Normal range of motion.   Skin:     General: Skin is warm and dry.   Neurological:      Mental Status: She is alert and oriented to person, place, and time.   Psychiatric:         Mood and Affect: Mood normal.         Behavior: Behavior normal.                        Assessment & Plan   Diagnoses and all orders for this visit:    1. Class 3 severe obesity due to excess calories with serious comorbidity and body mass index (BMI) of 45.0 to 49.9 in adult (Primary)  Assessment & Plan:  Patient's (Body mass index is 49.91 kg/m².) indicates that they are morbidly/severely obese (BMI > 40 or > 35 with obesity - related health condition) with health conditions that include hypertension and diabetes mellitus . Weight is improving with treatment. BMI  is above average; BMI management plan is completed. We discussed portion control and increasing exercise.       2. Type 2 diabetes mellitus with ESRD (end-stage renal disease)  Comments:  Continue current regimen    3. Essential hypertension  Assessment & Plan:  Hypertension is unchanged.  Continue current treatment regimen.  Dietary sodium restriction.  Weight loss.  Blood pressure will be reassessed at the next regular appointment.         Maria C Shrestha was seen today for follow-up, obesity, nutrition counseling and weight loss.    Today we discussed healthy changes in " lifestyle, diet, and exercise. Dietician consultation obtained.  Maria C Shrestha had received handouts to her explaining the recommendation on portion sizes/appetite control/reading nutrition labels.   Intensive behavioral therapy for obesity was done today as well.     Goals for this month are:   Planning meals one day in advance to assist with timing   Increase vegetable intake   Eliminate group C in the evening and continue to log meals.     Follow up in 1 month for a weight recheck.    Patient will continue GREEN meal plan.

## 2024-01-02 NOTE — ASSESSMENT & PLAN NOTE
Patient's (Body mass index is 49.91 kg/m².) indicates that they are morbidly/severely obese (BMI > 40 or > 35 with obesity - related health condition) with health conditions that include hypertension and diabetes mellitus . Weight is improving with treatment. BMI  is above average; BMI management plan is completed. We discussed portion control and increasing exercise.

## 2024-01-04 ENCOUNTER — TELEPHONE (OUTPATIENT)
Dept: PSYCHIATRY | Age: 64
End: 2024-01-04

## 2024-01-04 NOTE — TELEPHONE ENCOUNTER
Called patient to remind them of their appointment   -unable to leave [facility]     Reminded patient to complete their visit pre-check/digital registration in [facility].    Electronically signed by Antonia Barlow MA on 1/4/2024 at 1:28 PM

## 2024-01-05 ENCOUNTER — OFFICE VISIT (OUTPATIENT)
Dept: PSYCHIATRY | Age: 64
End: 2024-01-05
Payer: MEDICARE

## 2024-01-05 VITALS
HEIGHT: 65 IN | BODY MASS INDEX: 48.82 KG/M2 | TEMPERATURE: 97.4 F | DIASTOLIC BLOOD PRESSURE: 46 MMHG | OXYGEN SATURATION: 98 % | SYSTOLIC BLOOD PRESSURE: 138 MMHG | WEIGHT: 293 LBS | HEART RATE: 65 BPM

## 2024-01-05 DIAGNOSIS — F33.0 MAJOR DEPRESSIVE DISORDER, RECURRENT, MILD (HCC): Primary | ICD-10-CM

## 2024-01-05 DIAGNOSIS — G47.00 INSOMNIA, UNSPECIFIED TYPE: ICD-10-CM

## 2024-01-05 DIAGNOSIS — F41.1 GENERALIZED ANXIETY DISORDER: ICD-10-CM

## 2024-01-05 PROCEDURE — 1036F TOBACCO NON-USER: CPT | Performed by: PSYCHIATRY & NEUROLOGY

## 2024-01-05 PROCEDURE — 99214 OFFICE O/P EST MOD 30 MIN: CPT | Performed by: PSYCHIATRY & NEUROLOGY

## 2024-01-05 PROCEDURE — G8484 FLU IMMUNIZE NO ADMIN: HCPCS | Performed by: PSYCHIATRY & NEUROLOGY

## 2024-01-05 PROCEDURE — G8428 CUR MEDS NOT DOCUMENT: HCPCS | Performed by: PSYCHIATRY & NEUROLOGY

## 2024-01-05 PROCEDURE — 3075F SYST BP GE 130 - 139MM HG: CPT | Performed by: PSYCHIATRY & NEUROLOGY

## 2024-01-05 PROCEDURE — G8417 CALC BMI ABV UP PARAM F/U: HCPCS | Performed by: PSYCHIATRY & NEUROLOGY

## 2024-01-05 PROCEDURE — 3078F DIAST BP <80 MM HG: CPT | Performed by: PSYCHIATRY & NEUROLOGY

## 2024-01-05 PROCEDURE — 3017F COLORECTAL CA SCREEN DOC REV: CPT | Performed by: PSYCHIATRY & NEUROLOGY

## 2024-01-05 ASSESSMENT — PATIENT HEALTH QUESTIONNAIRE - PHQ9
9. THOUGHTS THAT YOU WOULD BE BETTER OFF DEAD, OR OF HURTING YOURSELF: 0
6. FEELING BAD ABOUT YOURSELF - OR THAT YOU ARE A FAILURE OR HAVE LET YOURSELF OR YOUR FAMILY DOWN: 0
5. POOR APPETITE OR OVEREATING: 0
SUM OF ALL RESPONSES TO PHQ9 QUESTIONS 1 & 2: 1
4. FEELING TIRED OR HAVING LITTLE ENERGY: 1
1. LITTLE INTEREST OR PLEASURE IN DOING THINGS: 1
10. IF YOU CHECKED OFF ANY PROBLEMS, HOW DIFFICULT HAVE THESE PROBLEMS MADE IT FOR YOU TO DO YOUR WORK, TAKE CARE OF THINGS AT HOME, OR GET ALONG WITH OTHER PEOPLE: 1
2. FEELING DOWN, DEPRESSED OR HOPELESS: 0
3. TROUBLE FALLING OR STAYING ASLEEP: 0
7. TROUBLE CONCENTRATING ON THINGS, SUCH AS READING THE NEWSPAPER OR WATCHING TELEVISION: 1
SUM OF ALL RESPONSES TO PHQ QUESTIONS 1-9: 3
8. MOVING OR SPEAKING SO SLOWLY THAT OTHER PEOPLE COULD HAVE NOTICED. OR THE OPPOSITE, BEING SO FIGETY OR RESTLESS THAT YOU HAVE BEEN MOVING AROUND A LOT MORE THAN USUAL: 0
SUM OF ALL RESPONSES TO PHQ QUESTIONS 1-9: 3

## 2024-01-05 NOTE — PROGRESS NOTES
observed.  Patient is psychiatrically stable.    Plan:  1.  Continue current regimen without changes.  The risks, benefits, side effects, indications, contraindications, and adverse effects of the medications have been discussed. Yes.  2. The pt has verbalized understanding and has capacity to give informed consent.  3. The Kurt report has been reviewed according to HB1 regulations.  4. Supportive therapy offered.  Patient will continue to see her therapist.  5. Follow up: Return in about 3 months (around 4/5/2024).  6. The patient has been advised to call with any problems.  Instructed to call suicide hotline, 911 or come to the ER if suicidal or symptoms worsen.  7. Controlled substance Treatment Plan: NA  8. The above listed medications have been continued, modifications in meds and other orders/labs as follows:      No orders of the defined types were placed in this encounter.     No orders of the defined types were placed in this encounter.      9. Additional comments:         10.Over 50% of the total visit time of  30  minutes was spent on counseling and/or coordination of care of:                        1. Major depressive disorder, recurrent, mild (HCC)    2. Generalized anxiety disorder    3. Insomnia, unspecified type                  Alejandra Norton MD

## 2024-01-12 ENCOUNTER — NURSE ONLY (OUTPATIENT)
Dept: PRIMARY CARE CLINIC | Age: 64
End: 2024-01-12
Payer: MEDICARE

## 2024-01-12 DIAGNOSIS — E53.8 B12 DEFICIENCY: Primary | ICD-10-CM

## 2024-01-12 PROCEDURE — 96372 THER/PROPH/DIAG INJ SC/IM: CPT | Performed by: NURSE PRACTITIONER

## 2024-01-12 RX ORDER — CYANOCOBALAMIN 1000 UG/ML
1000 INJECTION, SOLUTION INTRAMUSCULAR; SUBCUTANEOUS ONCE
Status: COMPLETED | OUTPATIENT
Start: 2024-01-12 | End: 2024-01-12

## 2024-01-12 RX ADMIN — CYANOCOBALAMIN 1000 MCG: 1000 INJECTION, SOLUTION INTRAMUSCULAR; SUBCUTANEOUS at 12:42

## 2024-01-12 NOTE — PROGRESS NOTES
After obtaining consent, and per orders of Shea RAMIREZ , injection of b-12 1000mg  given in left deltoid by Jenny Agrawal MA. Patient instructed to remain in clinic for 20 minutes afterwards, and to report any adverse reaction to me immediately.

## 2024-01-16 ENCOUNTER — TELEPHONE (OUTPATIENT)
Dept: HEMATOLOGY | Age: 64
End: 2024-01-16

## 2024-01-16 NOTE — TELEPHONE ENCOUNTER
Called patient and reminded patient of their appointment on 1/17/2024and patient confirmed they would be here.

## 2024-01-18 ENCOUNTER — HOSPITAL ENCOUNTER (OUTPATIENT)
Dept: INFUSION THERAPY | Age: 64
Discharge: HOME | End: 2024-01-18
Payer: MEDICARE

## 2024-01-18 ENCOUNTER — OFFICE VISIT (OUTPATIENT)
Dept: HEMATOLOGY | Age: 64
End: 2024-01-18
Payer: MEDICARE

## 2024-01-18 VITALS
WEIGHT: 293 LBS | HEIGHT: 65 IN | SYSTOLIC BLOOD PRESSURE: 110 MMHG | TEMPERATURE: 97.6 F | HEART RATE: 85 BPM | BODY MASS INDEX: 48.82 KG/M2 | DIASTOLIC BLOOD PRESSURE: 60 MMHG | OXYGEN SATURATION: 96 %

## 2024-01-18 DIAGNOSIS — D72.829 LEUKOCYTOSIS, UNSPECIFIED TYPE: Primary | ICD-10-CM

## 2024-01-18 DIAGNOSIS — N18.5 ANEMIA, CHRONIC RENAL FAILURE, STAGE 5 (HCC): ICD-10-CM

## 2024-01-18 DIAGNOSIS — D63.1 ANEMIA, CHRONIC RENAL FAILURE, STAGE 5 (HCC): ICD-10-CM

## 2024-01-18 DIAGNOSIS — D72.829 LEUKOCYTOSIS, UNSPECIFIED TYPE: ICD-10-CM

## 2024-01-18 LAB
BASOPHILS # BLD: 0.07 K/UL (ref 0.01–0.08)
BASOPHILS NFR BLD: 0.8 % (ref 0.1–1.2)
EOSINOPHIL # BLD: 0.17 K/UL (ref 0.04–0.54)
EOSINOPHIL NFR BLD: 1.9 % (ref 0.7–7)
ERYTHROCYTE [DISTWIDTH] IN BLOOD BY AUTOMATED COUNT: 15.7 % (ref 11.7–14.4)
HCT VFR BLD AUTO: 41.8 % (ref 34.1–44.9)
HGB BLD-MCNC: 13.5 G/DL (ref 11.2–15.7)
LYMPHOCYTES # BLD: 1.29 K/UL (ref 1.18–3.74)
LYMPHOCYTES NFR BLD: 14.1 % (ref 19.3–53.1)
MCH RBC QN AUTO: 32.5 PG (ref 25.6–32.2)
MCHC RBC AUTO-ENTMCNC: 32.3 G/DL (ref 32.3–35.5)
MCV RBC AUTO: 100.7 FL (ref 79.4–94.8)
MONOCYTES # BLD: 0.59 K/UL (ref 0.24–0.82)
MONOCYTES NFR BLD: 6.5 % (ref 4.7–12.5)
NEUTROPHILS # BLD: 6.99 K/UL (ref 1.56–6.13)
NEUTS SEG NFR BLD: 76.4 % (ref 34–71.1)
PLATELET # BLD AUTO: 225 K/UL (ref 182–369)
PMV BLD AUTO: 9.3 FL (ref 7.4–10.4)
RBC # BLD AUTO: 4.15 M/UL (ref 3.93–5.22)
WBC # BLD AUTO: 9.14 K/UL (ref 3.98–10.04)

## 2024-01-18 PROCEDURE — 36415 COLL VENOUS BLD VENIPUNCTURE: CPT

## 2024-01-18 PROCEDURE — G8427 DOCREV CUR MEDS BY ELIG CLIN: HCPCS | Performed by: PHYSICIAN ASSISTANT

## 2024-01-18 PROCEDURE — 1036F TOBACCO NON-USER: CPT | Performed by: PHYSICIAN ASSISTANT

## 2024-01-18 PROCEDURE — 85025 COMPLETE CBC W/AUTO DIFF WBC: CPT

## 2024-01-18 PROCEDURE — 99213 OFFICE O/P EST LOW 20 MIN: CPT | Performed by: PHYSICIAN ASSISTANT

## 2024-01-18 PROCEDURE — 99211 OFF/OP EST MAY X REQ PHY/QHP: CPT

## 2024-01-18 PROCEDURE — 3017F COLORECTAL CA SCREEN DOC REV: CPT | Performed by: PHYSICIAN ASSISTANT

## 2024-01-18 PROCEDURE — 3074F SYST BP LT 130 MM HG: CPT | Performed by: PHYSICIAN ASSISTANT

## 2024-01-18 PROCEDURE — G8417 CALC BMI ABV UP PARAM F/U: HCPCS | Performed by: PHYSICIAN ASSISTANT

## 2024-01-18 PROCEDURE — G8484 FLU IMMUNIZE NO ADMIN: HCPCS | Performed by: PHYSICIAN ASSISTANT

## 2024-01-18 PROCEDURE — 3078F DIAST BP <80 MM HG: CPT | Performed by: PHYSICIAN ASSISTANT

## 2024-01-18 RX ORDER — SEVELAMER CARBONATE 800 MG/1
TABLET, FILM COATED ORAL
COMMUNITY
Start: 2023-11-13

## 2024-01-18 RX ORDER — ACETAMINOPHEN 500 MG
TABLET ORAL PRN
COMMUNITY
Start: 2018-08-15

## 2024-01-18 RX ORDER — FAMOTIDINE 20 MG/1
TABLET, FILM COATED ORAL
COMMUNITY
Start: 2024-01-17

## 2024-01-18 RX ORDER — LOSARTAN POTASSIUM 50 MG/1
1 TABLET ORAL
COMMUNITY
Start: 2023-11-22

## 2024-01-18 RX ORDER — BUPROPION HYDROCHLORIDE 150 MG/1
1 TABLET, EXTENDED RELEASE ORAL
COMMUNITY
Start: 2023-01-23

## 2024-01-18 RX ORDER — B,C/FERROUS FUM/FA/D3/ZINC OX 8MG-800MCG
1 TABLET ORAL DAILY
COMMUNITY
Start: 2023-11-14

## 2024-01-18 RX ORDER — INSULIN PMP CART,AUT,G6/7,CNTR
EACH SUBCUTANEOUS
COMMUNITY
Start: 2023-11-27

## 2024-01-18 ASSESSMENT — ENCOUNTER SYMPTOMS
VOICE CHANGE: 0
COUGH: 0
EYE ITCHING: 0
CONSTIPATION: 0
VOMITING: 0
ABDOMINAL DISTENTION: 0
DIARRHEA: 0
EYE DISCHARGE: 0
SORE THROAT: 0
BLOOD IN STOOL: 0
PHOTOPHOBIA: 0
SHORTNESS OF BREATH: 1
ABDOMINAL PAIN: 0
TROUBLE SWALLOWING: 0
WHEEZING: 0
COLOR CHANGE: 0
BACK PAIN: 1
NAUSEA: 0

## 2024-02-05 RX ORDER — BUPROPION HYDROCHLORIDE 100 MG/1
100 TABLET, EXTENDED RELEASE ORAL 2 TIMES DAILY
Qty: 180 TABLET | Refills: 0 | Status: SHIPPED | OUTPATIENT
Start: 2024-02-05

## 2024-02-05 NOTE — TELEPHONE ENCOUNTER
Sara Pardo called to request a refill on her medication.      Last office visit : 11/7/2023   Next office visit : 4/16/2024     Requested Prescriptions     Pending Prescriptions Disp Refills    buPROPion (WELLBUTRIN SR) 100 MG extended release tablet [Pharmacy Med Name: BUPROPION HCL  MG TABLET] 180 tablet 0     Sig: TAKE ONE TABLET BY MOUTH 2 TIMES A DAY            Brandy Garcia LPN

## 2024-02-06 ENCOUNTER — OFFICE VISIT (OUTPATIENT)
Dept: BARIATRICS/WEIGHT MGMT | Facility: CLINIC | Age: 64
End: 2024-02-06
Payer: MEDICARE

## 2024-02-06 VITALS
SYSTOLIC BLOOD PRESSURE: 104 MMHG | HEIGHT: 65 IN | TEMPERATURE: 97.1 F | BODY MASS INDEX: 48.82 KG/M2 | DIASTOLIC BLOOD PRESSURE: 54 MMHG | OXYGEN SATURATION: 97 % | HEART RATE: 88 BPM | WEIGHT: 293 LBS

## 2024-02-06 DIAGNOSIS — G47.30 SLEEP APNEA, UNSPECIFIED TYPE: ICD-10-CM

## 2024-02-06 DIAGNOSIS — I10 ESSENTIAL HYPERTENSION: ICD-10-CM

## 2024-02-06 DIAGNOSIS — E66.01 CLASS 3 SEVERE OBESITY DUE TO EXCESS CALORIES WITH SERIOUS COMORBIDITY AND BODY MASS INDEX (BMI) OF 50.0 TO 59.9 IN ADULT: Primary | ICD-10-CM

## 2024-02-08 DIAGNOSIS — K21.9 GASTROESOPHAGEAL REFLUX DISEASE WITHOUT ESOPHAGITIS: Primary | ICD-10-CM

## 2024-02-08 NOTE — TELEPHONE ENCOUNTER
Sara Pardo called to request a refill on her medication.      Last office visit : 11/7/2023   Next office visit : 4/16/2024     Requested Prescriptions     Pending Prescriptions Disp Refills    famotidine (PEPCID) 20 MG tablet 60 tablet        We have never prescribed before. Please review and advise.      Brandy Garcia LPN

## 2024-02-09 RX ORDER — FAMOTIDINE 10 MG
10 TABLET ORAL NIGHTLY PRN
Qty: 90 TABLET | Refills: 1 | Status: SHIPPED | OUTPATIENT
Start: 2024-02-09 | End: 2024-08-07

## 2024-02-12 ENCOUNTER — NURSE ONLY (OUTPATIENT)
Dept: PRIMARY CARE CLINIC | Age: 64
End: 2024-02-12
Payer: MEDICARE

## 2024-02-12 DIAGNOSIS — E53.8 B12 DEFICIENCY: Primary | ICD-10-CM

## 2024-02-12 PROCEDURE — 96372 THER/PROPH/DIAG INJ SC/IM: CPT | Performed by: INTERNAL MEDICINE

## 2024-02-12 PROCEDURE — 99999 PR OFFICE/OUTPT VISIT,PROCEDURE ONLY: CPT | Performed by: INTERNAL MEDICINE

## 2024-02-12 RX ORDER — CYANOCOBALAMIN 1000 UG/ML
1000 INJECTION, SOLUTION INTRAMUSCULAR; SUBCUTANEOUS ONCE
Status: COMPLETED | OUTPATIENT
Start: 2024-02-12 | End: 2024-02-12

## 2024-02-12 RX ADMIN — CYANOCOBALAMIN 1000 MCG: 1000 INJECTION, SOLUTION INTRAMUSCULAR; SUBCUTANEOUS at 10:47

## 2024-03-06 ENCOUNTER — NUTRITION (OUTPATIENT)
Dept: BARIATRICS/WEIGHT MGMT | Facility: CLINIC | Age: 64
End: 2024-03-06
Payer: MEDICARE

## 2024-03-06 ENCOUNTER — OFFICE VISIT (OUTPATIENT)
Dept: PULMONOLOGY | Age: 64
End: 2024-03-06
Payer: MEDICARE

## 2024-03-06 VITALS
WEIGHT: 293 LBS | BODY MASS INDEX: 45.99 KG/M2 | OXYGEN SATURATION: 97 % | DIASTOLIC BLOOD PRESSURE: 69 MMHG | HEART RATE: 65 BPM | HEIGHT: 67 IN | SYSTOLIC BLOOD PRESSURE: 139 MMHG | TEMPERATURE: 97.2 F

## 2024-03-06 VITALS — WEIGHT: 293 LBS | BODY MASS INDEX: 48.82 KG/M2 | HEIGHT: 65 IN

## 2024-03-06 DIAGNOSIS — G47.8 NON-RESTORATIVE SLEEP: ICD-10-CM

## 2024-03-06 DIAGNOSIS — G47.33 OSA ON CPAP: Primary | ICD-10-CM

## 2024-03-06 DIAGNOSIS — E66.01 MORBID OBESITY (HCC): ICD-10-CM

## 2024-03-06 DIAGNOSIS — G47.10 HYPERSOMNIA: ICD-10-CM

## 2024-03-06 PROCEDURE — G8427 DOCREV CUR MEDS BY ELIG CLIN: HCPCS | Performed by: INTERNAL MEDICINE

## 2024-03-06 PROCEDURE — 3075F SYST BP GE 130 - 139MM HG: CPT | Performed by: INTERNAL MEDICINE

## 2024-03-06 PROCEDURE — G8484 FLU IMMUNIZE NO ADMIN: HCPCS | Performed by: INTERNAL MEDICINE

## 2024-03-06 PROCEDURE — G8417 CALC BMI ABV UP PARAM F/U: HCPCS | Performed by: INTERNAL MEDICINE

## 2024-03-06 PROCEDURE — 1036F TOBACCO NON-USER: CPT | Performed by: INTERNAL MEDICINE

## 2024-03-06 PROCEDURE — 3078F DIAST BP <80 MM HG: CPT | Performed by: INTERNAL MEDICINE

## 2024-03-06 PROCEDURE — 99214 OFFICE O/P EST MOD 30 MIN: CPT | Performed by: INTERNAL MEDICINE

## 2024-03-06 PROCEDURE — 3017F COLORECTAL CA SCREEN DOC REV: CPT | Performed by: INTERNAL MEDICINE

## 2024-03-06 ASSESSMENT — ENCOUNTER SYMPTOMS
SHORTNESS OF BREATH: 1
CHEST TIGHTNESS: 0
ANAL BLEEDING: 0
RHINORRHEA: 0
ABDOMINAL DISTENTION: 0
BACK PAIN: 0
COUGH: 0
APNEA: 1
WHEEZING: 0
ABDOMINAL PAIN: 0

## 2024-03-06 NOTE — PROGRESS NOTES
"Metabolic and Bariatric Surgery Adult Nutrition Assessment    Patient Name: Maria C Shrestha   YOB: 1960   MRN: [id number]     Assessment Date:  03/06/2024     Reason for Visit: Follow-up Nutrition Assessment     Treatment Pathway: Medical Weight Loss    Assessment    Anthropometrics   Wt Readings from Last 1 Encounters:   03/06/24 (!) 136 kg (300 lb 12.8 oz)     Ht Readings from Last 1 Encounters:   03/06/24 164.5 cm (64.75\")     BMI Readings from Last 1 Encounters:   03/06/24 50.44 kg/m²        Initial Weight/Date: 308.4 lbs (Nov 2023)  Weight Changes since last visit: -0.6 lbs  Net Weight Change: -7.6 lbs     Past Medical History:   Diagnosis Date    Acquired hypothyroidism 02/06/2017    Allergic rhinitis     Anemia due to stage 4 chronic kidney disease 08/18/2020    Anxiety     Arthritis     Carpal tunnel syndrome 07/2023    Cellulitis     CHF (congestive heart failure)     Chronic kidney disease     3rd stage    Depression     Dialysis patient     AV fistula in Left arm    Dyslipidemia     Elevated cholesterol     Essential hypertension     Fatigue     Gallbladder abscess     Hearing loss     History of transfusion     Insulin pump in place     Omnipod    Light headed     Limited range of motion (ROM) of shoulder     right    Obesity     Obstructive sleep apnea treated with continuous positive airway pressure (CPAP)     Palpitation     Short of breath on exertion     Sinusitis     Stage 4 chronic kidney disease 09/16/2020    Type 2 diabetes mellitus     Type II diabetes mellitus, uncontrolled     Wears glasses       Past Surgical History:   Procedure Laterality Date    ADENOIDECTOMY      ANAL FISTULA REPAIR Left     x 2     ARTERIOVENOUS FISTULA Left 08/2021    CARPAL TUNNEL RELEASE Left 05/24/2023    Procedure: LEFT OPEN CARPAL TUNNEL RELEASE;  Surgeon: Juno Ledbetter MD;  Location: [facility];  Service: Orthopedics;  Laterality: Left;    CARPAL TUNNEL RELEASE Right 8/2/2023    Procedure: RIGHT OPEN " CARPAL TUNNEL RELEASE;  Surgeon: Juno Ledbetter MD;  Location: [facility] OR;  Service: Orthopedics;  Laterality: Right;    CHOLECYSTECTOMY      COLONOSCOPY  01/02/2014    COLONOSCOPY N/A 03/22/2017    Procedure: COLONOSCOPY WITH ANESTHESIA;  Surgeon: Mono Linder MD;  Location: [facility] ENDOSCOPY;  Service:     COLONOSCOPY N/A 05/09/2022    Procedure: COLONOSCOPY WITH ANESTHESIA;  Surgeon: Mono Linder MD;  Location: [facility] ENDOSCOPY;  Service: Gastroenterology;  Laterality: N/A;  pre polyp;brbpr  post  Dr. Soliman    D & C HYSTEROSCOPY N/A 07/25/2018    Procedure: DILATATION AND CURETTAGE HYSTEROSCOPY;  Surgeon: Michael Castaneda MD;  Location: [facility] OR;  Service: Obstetrics/Gynecology    DILATATION AND CURETTAGE      X 2    ENDOSCOPY      ENDOSCOPY N/A 05/09/2022    Procedure: ESOPHAGOGASTRODUODENOSCOPY WITH ANESTHESIA;  Surgeon: Mono Linder MD;  Location: [facility] ENDOSCOPY;  Service: Gastroenterology;  Laterality: N/A;  pre screen  post gastric polyps  Dr. Soliman    ENDOSCOPY N/A 11/28/2022    Procedure: ESOPHAGOGASTRODUODENOSCOPY WITH ANESTHESIA;  Surgeon: Mono Linder MD;  Location: [facility] ENDOSCOPY;  Service: Gastroenterology;  Laterality: N/A;  pre: hx gastric polyp  post: Retained food, gastritis  dr sebastián soliman    ENDOSCOPY N/A 12/29/2022    Procedure: ESOPHAGOGASTRODUODENOSCOPY;  Surgeon: Mono Linder MD;  Location: [facility] ENDOSCOPY;  Service: Gastroenterology;  Laterality: N/A;  preop; hx of polyps  postop gastric polyps  Bette Coffman MD    ENDOSCOPY N/A 06/07/2023    Procedure: ESOPHAGOGASTRODUODENOSCOPY;  Surgeon: Mono Linder MD;  Location: [facility] ENDOSCOPY;  Service: Gastroenterology;  Laterality: N/A;  pre hx gastric polyp  post normal  dr bette breewr    HYSTERECTOMY      TONSILLECTOMY        Current Outpatient Medications   Medication Sig Dispense Refill    allopurinol (ZYLOPRIM) 100 MG tablet Take 1 tablet by mouth Daily. 30 tablet 11     calcitriol (ROCALTROL) 0.5 MCG capsule Take 1 capsule by mouth Daily.      carvedilol (COREG) 3.125 MG tablet Take 1 tablet by mouth 2 (Two) Times a Day.      cetirizine (zyrTEC) 10 MG tablet Take 1 tablet by mouth.      citalopram (CeleXA) 40 MG tablet Take 1 tablet by mouth Daily. 30 tablet 0    Continuous Blood Gluc Sensor (FreeStyle Filippo 2 Sensor) misc 1 each Every 14 (Fourteen) Days. Left arm 2 each 3    famotidine (PEPCID) 20 MG tablet Take 2 tablets by mouth 2 (Two) Times a Day. 120 tablet 11    fluticasone (FLONASE) 50 MCG/ACT nasal spray 1 spray into the nostril(s) as directed by provider 2 (Two) Times a Day.      Heparin Sod, Pork, Lock Flush (Heparin Na, Pork, Lock Flsh PF) 1 UNIT/ML solution lock flush 6,000 mL by Intracatheter route Every 8 (Eight) Hours. Administers before every dialysis treatment, which is 4-5 times a week at home      HUMULIN R 500 UNIT/ML CONCENTRATED injection INJECT UNDER THE SKIN INTO THE APPROPRIATE AREA 3 TIMES A DAY BEFORE MEALS 40 mL 11    Insulin Disposable Pump (Omnipod 5 G6 Pod, Gen 5,) mis 1 each Every Other Day. 15 each 6    losartan (COZAAR) 50 MG tablet Take 1 tablet by mouth Daily.      Misc. Devices (Bariatric Rollator) misc Please provide device for patient 1 each 0    POTASSIUM PO Take 1 tablet by mouth Daily.      Repatha solution prefilled syringe injection Inject 1 mL under theskin into the appropriate area as directed Every 14 (Fourteen) Days. 2 mL 4    rosuvastatin (CRESTOR) 20 MG tablet Take 1 tablet by mouth Every Night. 30 tablet 0    Semaglutide, 2 MG/DOSE, (Ozempic, 2 MG/DOSE,) 8 MG/3ML solution pen-injector Inject 2 mg under the skin into the appropriate area as directed 1 (One) Time Per Week. Please cancel Trulicity 3 mL 3    sevelamer (RENVELA) 800 MG tablet Take 4 tablets with each meal and 2 tablets with a snack      traZODone (DESYREL) 100 MG tablet Take 1 tablet by mouth Every Night.      levothyroxine (Synthroid) 112 MCG tablet Take 1 tablet by  mouth Daily. 30 tablet 11     No current facility-administered medications for this visit.      Allergies   Allergen Reactions    Codeine Nausea And Vomiting and GI Intolerance        Pertinent Social/Behavior/Environmental History: Working on financial status and moving homes. Has several stressors in home.    Nutrition Recall  Food recall reviewed.  Ate chocolate cupcake, tried chess bar, part of carrot cake, cookie before bed.   Snacking - cheeze-its at [employer] game.   Monitoring portions- n/a  Calculating Protein- n/a  Drinking sugary/carbonated beverages- 7-Up sometimes  Fluid Intake- 32+ oz daily.     Nutrition Intervention  Nutrition education for morbid obesity and nutrition coaching for behavior change provided.  Strategies used included Comprehensive education, Motivational Interviewing , Problem Solving, and Ongoing reinforcement  Review of medical weight loss prescription 4 meal/day plan and reviewed nutritional needs for Medical Weight Loss.  Self-monitoring strategies such as keeping a food journal (on paper or electronically) and calculating fluid/protein intake were discussed.    Recommended Diet Changes- Green Prescription Meal Plan  Eat 4 meals per day with protein and vegetables at each meal, no carbs after meal 2., Protein goal: 65 gms., Eat vegetables first at each meal., Discussed protein guidelines for shakes and bars., Reduce snacking -use foods from free foods list only., Reduce fat, sugar, and/or salt in food choices., Choose more nutrient dense foods., Choose foods with increased fiber., Monitor portion sizes using a food scale and/or measuring cup., Eliminate soda and sugar-sweetened beverages, and Increase fluid intake to 64 ounces per day      Goals  1. Continue to work on mindset and mental health.     Monitoring/Evaluation Plan  Anticipate follow in 1 months. Continue collaboration of care with physician and treatment team.     Time Spent 20 minutes    Electronically signed by  Lynda  PIPO Connell, CHICHI  03/06/2024 10:13 CST.

## 2024-03-06 NOTE — PROGRESS NOTES
in voice recognition may have occurred.      An electronic signature was used to authenticate this note.    --Carlota Guillory MD

## 2024-03-12 ENCOUNTER — TELEPHONE (OUTPATIENT)
Dept: PRIMARY CARE CLINIC | Age: 64
End: 2024-03-12

## 2024-03-12 ENCOUNTER — NURSE ONLY (OUTPATIENT)
Dept: PRIMARY CARE CLINIC | Age: 64
End: 2024-03-12
Payer: MEDICARE

## 2024-03-12 DIAGNOSIS — E53.8 B12 DEFICIENCY: Primary | ICD-10-CM

## 2024-03-12 PROCEDURE — 96372 THER/PROPH/DIAG INJ SC/IM: CPT | Performed by: INTERNAL MEDICINE

## 2024-03-12 RX ORDER — CYANOCOBALAMIN 1000 UG/ML
1000 INJECTION, SOLUTION INTRAMUSCULAR; SUBCUTANEOUS ONCE
Status: COMPLETED | OUTPATIENT
Start: 2024-03-12 | End: 2024-03-12

## 2024-03-12 RX ORDER — AMOXICILLIN 250 MG
1 CAPSULE ORAL DAILY
Qty: 90 TABLET | Refills: 3 | Status: SHIPPED | OUTPATIENT
Start: 2024-03-12 | End: 2025-03-07

## 2024-03-12 RX ADMIN — CYANOCOBALAMIN 1000 MCG: 1000 INJECTION, SOLUTION INTRAMUSCULAR; SUBCUTANEOUS at 10:00

## 2024-03-12 NOTE — PROGRESS NOTES
After obtaining consent, and per orders of Dr. Gilliland, injection of Vitamin B12 1000 mcg given in Right deltoid by Brandy Garcia LPN. Patient instructed to remain in clinic for 20 minutes afterwards, and to report any adverse reaction to me immediately.

## 2024-03-12 NOTE — TELEPHONE ENCOUNTER
Patient came in for a Vitamin B12 injection today and is needing the medication changed to the sublingual drops. Pharmacy is \[facility]\"" .

## 2024-03-14 RX ORDER — POTASSIUM CHLORIDE 1.5 G/1.58G
20 POWDER, FOR SOLUTION ORAL DAILY
Qty: 30 EACH | Refills: 0 | Status: SHIPPED | OUTPATIENT
Start: 2024-03-14

## 2024-03-14 NOTE — TELEPHONE ENCOUNTER
Sara Pardo called to request a refill on her medication.      Last office visit : 11/7/2023   Next office visit : 4/16/2024     Requested Prescriptions     Pending Prescriptions Disp Refills    potassium chloride (KLOR-CON) 20 MEQ packet 30 each 0     Sig: Take 20 mEq by mouth daily            Brandy Garcia LPN

## 2024-03-21 NOTE — PROGRESS NOTES
[facility] - PODIATRY    Today's Date: 03/28/2024     Patient Name: Maria C Shrestha  MRN: [id number]  CSN: [id number]  PCP: Fatmata Wallace MD  Referring Provider: No ref. provider found    SUBJECTIVE     Chief Complaint   Patient presents with    Follow-up     Fatmata Wallace MD-11/28/2023 3 MO FU DIABETIC [facility] CL[facility]IC- pt states feet doing ok other than neuropathy seems to be getting worse- pt denies pain due to neuropathy    Diabetes     A1C 6.2     HPI: Maria C Shrestha, a 63 y.o.female, comes to clinic as a(n) established patient presenting for diabetic foot exam and complaining of toenail/callus issues. Patient has h/o hypothyroidism, anemia, anxiety, arthritis, cellulitis, CKD, depression, thyroid disease, DM, HLD, HTN, fatigue, gallbladder abscess, obesity, . Patient is IDDM and unsure of last BG level. States that glucose is typically well controlled and states her last A1C was 6.2%. Today she reports that her toenails are long, thickened, and irregular and that she is unable to reach her nails now to care for them herself. She typically uses a  wheelchair for long distances but is not using it today. Has CKD and does her dialysis treatments at home. She does continue to note sensations in the feet that feels like she is walking on something/has something attached to feet.  Denies pain or open wounds/sores today. She does note she feels as if the numbness/tingling in her feet has worsened since her last visit. Relates previous treatment(s) including care by podiatry . Denies any constitutional symptoms. No other pedal complaints at this time.    Past Medical History:   Diagnosis Date    Acquired hypothyroidism 02/06/2017    Allergic rhinitis     Anemia due to stage 4 chronic kidney disease 08/18/2020    Anxiety     Arthritis     Carpal tunnel syndrome 07/2023    Cellulitis     CHF (congestive heart failure)     Chronic kidney disease     3rd stage    Depression     Dialysis  patient     AV fistula in Left arm    Dyslipidemia     Elevated cholesterol     Essential hypertension     Fatigue     Gallbladder abscess     Hearing loss     History of transfusion     Insulin pump in place     Omnipod    Light headed     Limited range of motion (ROM) of shoulder     right    Obesity     Obstructive sleep apnea treated with continuous positive airway pressure (CPAP)     Palpitation     Short of breath on exertion     Sinusitis     Stage 4 chronic kidney disease 09/16/2020    Type 2 diabetes mellitus     Type II diabetes mellitus, uncontrolled     Wears glasses      Past Surgical History:   Procedure Laterality Date    ADENOIDECTOMY      ANAL FISTULA REPAIR Left     x 2     ARTERIOVENOUS FISTULA Left 08/2021    CARPAL TUNNEL RELEASE Left 05/24/2023    Procedure: LEFT OPEN CARPAL TUNNEL RELEASE;  Surgeon: Juno Ledbetter MD;  Location: [facility] OR;  Service: Orthopedics;  Laterality: Left;    CARPAL TUNNEL RELEASE Right 8/2/2023    Procedure: RIGHT OPEN CARPAL TUNNEL RELEASE;  Surgeon: Juno Ledbetter MD;  Location: [facility] OR;  Service: Orthopedics;  Laterality: Right;    CHOLECYSTECTOMY      COLONOSCOPY  01/02/2014    COLONOSCOPY N/A 03/22/2017    Procedure: COLONOSCOPY WITH ANESTHESIA;  Surgeon: Mono Linder MD;  Location: [facility] ENDOSCOPY;  Service:     COLONOSCOPY N/A 05/09/2022    Procedure: COLONOSCOPY WITH ANESTHESIA;  Surgeon: Mono Linder MD;  Location: [facility] ENDOSCOPY;  Service: Gastroenterology;  Laterality: N/A;  pre polyp;brbpr  post  Dr. Soliman    D & C HYSTEROSCOPY N/A 07/25/2018    Procedure: DILATATION AND CURETTAGE HYSTEROSCOPY;  Surgeon: Michael Castaneda MD;  Location: [facility] OR;  Service: Obstetrics/Gynecology    DILATATION AND CURETTAGE      X 2    ENDOSCOPY      ENDOSCOPY N/A 05/09/2022    Procedure: ESOPHAGOGASTRODUODENOSCOPY WITH ANESTHESIA;  Surgeon: Mono Linder MD;  Location: [facility] ENDOSCOPY;  Service: Gastroenterology;  Laterality: N/A;  pre screen  post  gastric polyps  Dr. Soliman    ENDOSCOPY N/A 11/28/2022    Procedure: ESOPHAGOGASTRODUODENOSCOPY WITH ANESTHESIA;  Surgeon: Mono Linder MD;  Location: [facility] ENDOSCOPY;  Service: Gastroenterology;  Laterality: N/A;  pre: hx gastric polyp  post: Retained food, gastritis  dr sebastián soliman    ENDOSCOPY N/A 12/29/2022    Procedure: ESOPHAGOGASTRODUODENOSCOPY;  Surgeon: Mono Linder MD;  Location: [facility] ENDOSCOPY;  Service: Gastroenterology;  Laterality: N/A;  preop; hx of polyps  postop gastric polyps  Bette Coffman MD    ENDOSCOPY N/A 06/07/2023    Procedure: ESOPHAGOGASTRODUODENOSCOPY;  Surgeon: Mono Linder MD;  Location: [facility] ENDOSCOPY;  Service: Gastroenterology;  Laterality: N/A;  pre hx gastric polyp  post normal  dr bette brewer    HYSTERECTOMY      TONSILLECTOMY       Family History   Problem Relation Age of Onset    Diabetes Other     Heart failure Other     Cancer Other     Kidney disease Other     Lung cancer Mother     Heart disease Father     Diabetes Father     Obesity Father     Stroke Father     Prostate cancer Father     Cancer Maternal Grandmother     Uterine cancer Maternal Grandmother     Diabetes Maternal Grandfather     Cancer Paternal Grandmother     Colon cancer Paternal Grandmother     Diabetes Paternal Grandfather     Heart disease Paternal Grandfather     No Known Problems Sister     No Known Problems Brother     No Known Problems Daughter     No Known Problems Maternal Aunt     No Known Problems Paternal Aunt     BRCA 1/2 Neg Hx     Breast cancer Neg Hx     Endometrial cancer Neg Hx     Ovarian cancer Neg Hx      Social History     Socioeconomic History    Marital status:    Tobacco Use    Smoking status: Never    Smokeless tobacco: Never   Vaping Use    Vaping status: Never Used   Substance and Sexual Activity    Alcohol use: No    Drug use: No    Sexual activity: Defer     Birth control/protection: Post-menopausal, Hysterectomy     Allergies    Allergen Reactions    Codeine Nausea And Vomiting and GI Intolerance     Current Outpatient Medications   Medication Sig Dispense Refill    allopurinol (ZYLOPRIM) 100 MG tablet Take 1 tablet by mouth Daily. 30 tablet 11    aspirin 81 MG EC tablet Take 1 tablet by mouth Daily.      calcitriol (ROCALTROL) 0.5 MCG capsule Take 1 capsule by mouth Daily.      carvedilol (COREG) 3.125 MG tablet Take 1 tablet by mouth 2 (Two) Times a Day.      cetirizine (zyrTEC) 10 MG tablet Take 1 tablet by mouth.      citalopram (CeleXA) 40 MG tablet Take 1 tablet by mouth Daily. 30 tablet 0    Continuous Blood Gluc Sensor (FreeStyle Filippo 2 Sensor) misc 1 each Every 14 (Fourteen) Days. Left arm 2 each 3    famotidine (PEPCID) 20 MG tablet Take 2 tablets by mouth 2 (Two) Times a Day. 120 tablet 11    fluticasone (FLONASE) 50 MCG/ACT nasal spray 1 spray into the nostril(s) as directed by provider 2 (Two) Times a Day.      Heparin Sod, Pork, Lock Flush (Heparin Na, Pork, Lock Flsh PF) 1 UNIT/ML solution lock flush 6,000 mL by Intracatheter route Every 8 (Eight) Hours. Administers before every dialysis treatment, which is 4-5 times a week at home      HUMULIN R 500 UNIT/ML CONCENTRATED injection INJECT UNDER THE SKIN INTO THE APPROPRIATE AREA 3 TIMES A DAY BEFORE MEALS 40 mL 11    Insulin Disposable Pump (Omnipod 5 G6 Pod, Gen 5,) mis 1 each Every Other Day. 15 each 6    levothyroxine (Synthroid) 112 MCG tablet Take 1 tablet by mouth Daily. 30 tablet 11    losartan (COZAAR) 50 MG tablet Take 1 tablet by mouth Daily.      Misc. Devices (Bariatric Rollator) misc Please provide device for patient 1 each 0    POTASSIUM PO Take 1 tablet by mouth Daily.      Repatha solution prefilled syringe injection Inject 1 mL under theskin into the appropriate area as directed Every 14 (Fourteen) Days. 2 mL 4    rosuvastatin (CRESTOR) 20 MG tablet Take 1 tablet by mouth Every Night. 30 tablet 0    Semaglutide, 2 MG/DOSE, (Ozempic, 2 MG/DOSE,) 8 MG/3ML  solution pen-injector Inject 2 mg under the skin into the appropriate area as directed 1 (One) Time Per Week. Please cancel Trulicity 3 mL 3    sevelamer (RENVELA) 800 MG tablet Take 4 tablets with each meal and 2 tablets with a snack      traZODone (DESYREL) 100 MG tablet Take 1 tablet by mouth Every Night.       No current facility-administered medications for this visit.     Review of Systems   Constitutional:  Negative for chills and fever.   HENT:  Negative for congestion.    Respiratory:  Negative for shortness of breath.    Cardiovascular:  Positive for leg swelling. Negative for chest pain.   Gastrointestinal:  Negative for constipation, diarrhea, nausea and vomiting.   Musculoskeletal:  Positive for arthralgias and gait problem.        Occasional foot pain; unsteady gait   Skin:  Negative for wound.   Neurological:  Positive for numbness. Negative for dizziness and weakness.   Hematological:  Does not bruise/bleed easily.   Psychiatric/Behavioral:  Negative for agitation, behavioral problems and confusion.        OBJECTIVE     Vitals:    24 1110   BP: 124/62   Pulse: 70   SpO2: 98%           PHYSICAL EXAM  GEN:   Accompanied by none.     Foot/Ankle Exam    GENERAL  Diabetic foot exam performed    Appearance:  obese  Orientation:  AAOx3  Affect:  appropriate  Gait:  (Unsteady)  Assistance:  independent  Right shoe gear: casual shoe  Left shoe gear: casual shoe    VASCULAR     Right Foot Vascularity   Dorsalis pedis:  2+  Posterior tibial:  2+  Skin temperature:  warm  Edema gradin+ and non-pitting  CFT:  3  Pedal hair growth:  Present  Varicosities:  none     Left Foot Vascularity   Dorsalis pedis:  2+  Posterior tibial:  2+  Skin temperature:  warm  Edema gradin+ and non-pitting  CFT:  3  Pedal hair growth:  Present  Varicosities:  none     NEUROLOGIC     Right Foot Neurologic   Light touch sensation: diminished  Vibratory sensation: normal  Hot/Cold sensation: normal  Protective Sensation  using [place] Monofilament:   Sites intact: 8  Sites tested: 10     Left Foot Neurologic   Light touch sensation: diminished  Vibratory sensation: normal  Hot/Cold sensation:  normal  Protective Sensation using [place] Monofilament:   Sites intact: 8  Sites tested: 10    MUSCULOSKELETAL     Right Foot Musculoskeletal   Ecchymosis:  none  Tenderness:  toenail problem    Arch:  Normal  Hallux valgus: No       Left Foot Musculoskeletal   Ecchymosis:  none  Tenderness:  toenail problem  Arch:  Normal  Hallux valgus: No      MUSCLE STRENGTH     Right Foot Muscle Strength   Foot dorsiflexion:  5  Foot plantar flexion:  5  Foot inversion:  5  Foot eversion:  5     Left Foot Muscle Strength   Foot dorsiflexion:  5  Foot plantar flexion:  5  Foot inversion:  5  Foot eversion:  5    RANGE OF MOTION     Right Foot Range of Motion   Foot and ankle ROM within normal limits       Left Foot Range of Motion   Foot and ankle ROM within normal limits      DERMATOLOGIC      Right Foot Dermatologic   Skin  Right foot skin is intact. Negative for corn and tinea.   Nails  1.  Positive for onychomycosis, abnormal thickness and subungual debris.  2.  Positive for abnormal thickness and subungual debris.  3.  Positive for onychomycosis, abnormal thickness and subungual debris.  4.  Positive for onychomycosis, abnormal thickness and subungual debris.  5.  Positive for onychomycosis, abnormal thickness and subungual debris.     Left Foot Dermatologic   Skin  Left foot skin is intact. Negative for corn and tinea.   Nails  1.  Positive for elongated, onychomycosis, abnormal thickness and subungual debris.  2.  Positive for onychomycosis, abnormal thickness and subungual debris.  3.  Positive for onychomycosis, abnormal thickness, subungual debris and dystrophic nail.  4.  Positive for onychomycosis, abnormally thick and subungual debris.  5.  Positive for onychomycosis, abnormally thick and subungual  debris.      RADIOLOGY/NUCLEAR:  No results found.    LABORATORY/CULTURE RESULTS:      PATHOLOGY RESULTS:       ASSESSMENT/PLAN     Diagnoses and all orders for this visit:    1. Onychomycosis (Primary)    2. Controlled type 2 diabetes mellitus with stage 4 chronic kidney disease, without long-term current use of insulin    3. Stage 5 chronic kidney disease on chronic dialysis    4. Encounter for diabetic foot exam    5. Gait abnormality          Comprehensive lower extremity examination and evaluation was performed.  Discussed findings and treatment plan including risks, benefits, and treatment options with patient in detail. Patient agreed with treatment plan.  After verbal consent obtained, nail(s) x10 debrided of length and thickness with nail nipper without incidence  Patient may maintain nails and calluses at home utilizing emery board or pumice stone between visits as needed  Reviewed at home diabetic foot care including daily foot checks   DFE performed today.  Continue diabetic monitoring and control under direction of PCP.  Continue to elevate lower extremities while at rest.   Discussed different treatment options for Onychomycosis today- patient continues to remain conservative with treatment at this time.    An After Visit Summary was printed and given to the patient at discharge, including (if requested) any available informative/educational handouts regarding diagnosis, treatment, or medications. All questions were answered to patient/family satisfaction. Should symptoms fail to improve or worsen they agree to call or return to clinic or to go to the Emergency Department. Discussed the importance of following up with any needed screening tests/labs/specialist appointments and any requested follow-up recommended by me today. Importance of maintaining follow-up discussed and patient accepts that missed appointments can delay diagnosis and potentially lead to worsening of conditions.  Return in about 3  months (around 6/28/2024) for Follow-up with APRN, Follow-up in [facility] or sooner if acute issues arise.        This document has been electronically signed by FLOR Zaldivar on March 28, 2024 11:39 CDT

## 2024-03-27 ENCOUNTER — TELEPHONE (OUTPATIENT)
Dept: PODIATRY | Facility: CLINIC | Age: 64
End: 2024-03-27
Payer: MEDICARE

## 2024-03-28 ENCOUNTER — OFFICE VISIT (OUTPATIENT)
Dept: PODIATRY | Facility: CLINIC | Age: 64
End: 2024-03-28
Payer: MEDICARE

## 2024-03-28 VITALS
SYSTOLIC BLOOD PRESSURE: 124 MMHG | HEIGHT: 65 IN | BODY MASS INDEX: 48.82 KG/M2 | DIASTOLIC BLOOD PRESSURE: 62 MMHG | WEIGHT: 293 LBS | HEART RATE: 70 BPM | OXYGEN SATURATION: 98 %

## 2024-03-28 DIAGNOSIS — N18.6 STAGE 5 CHRONIC KIDNEY DISEASE ON CHRONIC DIALYSIS: ICD-10-CM

## 2024-03-28 DIAGNOSIS — Z99.2 STAGE 5 CHRONIC KIDNEY DISEASE ON CHRONIC DIALYSIS: ICD-10-CM

## 2024-03-28 DIAGNOSIS — E11.9 ENCOUNTER FOR DIABETIC FOOT EXAM: ICD-10-CM

## 2024-03-28 DIAGNOSIS — N18.4 CONTROLLED TYPE 2 DIABETES MELLITUS WITH STAGE 4 CHRONIC KIDNEY DISEASE, WITHOUT LONG-TERM CURRENT USE OF INSULIN: ICD-10-CM

## 2024-03-28 DIAGNOSIS — E11.22 CONTROLLED TYPE 2 DIABETES MELLITUS WITH STAGE 4 CHRONIC KIDNEY DISEASE, WITHOUT LONG-TERM CURRENT USE OF INSULIN: ICD-10-CM

## 2024-03-28 DIAGNOSIS — R26.9 GAIT ABNORMALITY: ICD-10-CM

## 2024-03-28 DIAGNOSIS — B35.1 ONYCHOMYCOSIS: Primary | ICD-10-CM

## 2024-03-28 RX ORDER — ASPIRIN 81 MG/1
81 TABLET ORAL DAILY
COMMUNITY

## 2024-04-04 ENCOUNTER — TELEPHONE (OUTPATIENT)
Dept: PSYCHIATRY | Age: 64
End: 2024-04-04

## 2024-04-04 NOTE — TELEPHONE ENCOUNTER
Called to remind pt of her appt for 4/5 @ 11 AM    Was unable to lvm due to fast busy signal     Electronically signed by Jeff Trujillo on 4/4/2024 at 11:21 AM

## 2024-04-05 ENCOUNTER — OFFICE VISIT (OUTPATIENT)
Dept: PSYCHIATRY | Age: 64
End: 2024-04-05
Payer: MEDICARE

## 2024-04-05 VITALS
WEIGHT: 293 LBS | OXYGEN SATURATION: 98 % | HEIGHT: 65 IN | DIASTOLIC BLOOD PRESSURE: 59 MMHG | BODY MASS INDEX: 48.82 KG/M2 | SYSTOLIC BLOOD PRESSURE: 148 MMHG | HEART RATE: 71 BPM | TEMPERATURE: 97.4 F

## 2024-04-05 DIAGNOSIS — F41.1 GENERALIZED ANXIETY DISORDER: ICD-10-CM

## 2024-04-05 DIAGNOSIS — F33.0 MAJOR DEPRESSIVE DISORDER, RECURRENT, MILD (HCC): Primary | ICD-10-CM

## 2024-04-05 DIAGNOSIS — G47.00 INSOMNIA, UNSPECIFIED TYPE: ICD-10-CM

## 2024-04-05 PROCEDURE — 3078F DIAST BP <80 MM HG: CPT | Performed by: PSYCHIATRY & NEUROLOGY

## 2024-04-05 PROCEDURE — 1036F TOBACCO NON-USER: CPT | Performed by: PSYCHIATRY & NEUROLOGY

## 2024-04-05 PROCEDURE — 3017F COLORECTAL CA SCREEN DOC REV: CPT | Performed by: PSYCHIATRY & NEUROLOGY

## 2024-04-05 PROCEDURE — G8417 CALC BMI ABV UP PARAM F/U: HCPCS | Performed by: PSYCHIATRY & NEUROLOGY

## 2024-04-05 PROCEDURE — 3077F SYST BP >= 140 MM HG: CPT | Performed by: PSYCHIATRY & NEUROLOGY

## 2024-04-05 PROCEDURE — 99214 OFFICE O/P EST MOD 30 MIN: CPT | Performed by: PSYCHIATRY & NEUROLOGY

## 2024-04-05 PROCEDURE — G8428 CUR MEDS NOT DOCUMENT: HCPCS | Performed by: PSYCHIATRY & NEUROLOGY

## 2024-04-05 ASSESSMENT — PATIENT HEALTH QUESTIONNAIRE - PHQ9
6. FEELING BAD ABOUT YOURSELF - OR THAT YOU ARE A FAILURE OR HAVE LET YOURSELF OR YOUR FAMILY DOWN: NOT AT ALL
SUM OF ALL RESPONSES TO PHQ QUESTIONS 1-9: 5
9. THOUGHTS THAT YOU WOULD BE BETTER OFF DEAD, OR OF HURTING YOURSELF: NOT AT ALL
SUM OF ALL RESPONSES TO PHQ QUESTIONS 1-9: 5
SUM OF ALL RESPONSES TO PHQ9 QUESTIONS 1 & 2: 2
8. MOVING OR SPEAKING SO SLOWLY THAT OTHER PEOPLE COULD HAVE NOTICED. OR THE OPPOSITE, BEING SO FIGETY OR RESTLESS THAT YOU HAVE BEEN MOVING AROUND A LOT MORE THAN USUAL: NOT AT ALL
4. FEELING TIRED OR HAVING LITTLE ENERGY: SEVERAL DAYS
10. IF YOU CHECKED OFF ANY PROBLEMS, HOW DIFFICULT HAVE THESE PROBLEMS MADE IT FOR YOU TO DO YOUR WORK, TAKE CARE OF THINGS AT HOME, OR GET ALONG WITH OTHER PEOPLE: SOMEWHAT DIFFICULT
SUM OF ALL RESPONSES TO PHQ QUESTIONS 1-9: 5
SUM OF ALL RESPONSES TO PHQ QUESTIONS 1-9: 5
2. FEELING DOWN, DEPRESSED OR HOPELESS: SEVERAL DAYS
5. POOR APPETITE OR OVEREATING: NOT AT ALL
7. TROUBLE CONCENTRATING ON THINGS, SUCH AS READING THE NEWSPAPER OR WATCHING TELEVISION: SEVERAL DAYS
1. LITTLE INTEREST OR PLEASURE IN DOING THINGS: SEVERAL DAYS
3. TROUBLE FALLING OR STAYING ASLEEP: SEVERAL DAYS

## 2024-04-05 NOTE — PROGRESS NOTES
4/5/2024 11:24 AM   Progress Note          Sara JANNETTE Edvin 1960      Chief Complaint   Patient presents with    Medication Check         Subjective:    64-year-old white female with history of depression and anxiety, obesity, diabetes, ESRD on dialysis, hypothyroidism, obstructive sleep apnea on CPAP, presents for follow up.  She is on Wellbutrin, Celexa. On trazodone 100 mg for sleep.      Patient is calm and cooperative.  Smiling at times.  Overall doing well. No suicidal thoughts.  Some anxiety due to financial issues and the need to move.  Marital issues.  Coping well. Seeing a therapist which she finds helpful.  Denies the need for medication changes at this time.       BP (!) 148/59   Pulse 71   Temp 97.4 °F (36.3 °C)   Ht 1.651 m (5' 5\")   Wt (!) 137.7 kg (303 lb 8 oz)   LMP 01/01/2000   SpO2 98%   BMI 50.51 kg/m²       Review of Systems - 14 point review:  Negative except for    Constitutional: (fevers, chills, night sweats, wt loss/gain, change in appetite, fatigue, somnolence)    HEENT: (ear pain or discharge, hearing loss, ear ringing, sinus pressure, nosebleed, nasal discharge, sore throat, oral sores, tooth pain, bleeding gums, hoarse voice, neck pain)      Cardiovascular: (HTN, chest pain, elevated cholesterol/lipids, palpitations, leg swelling, leg pain with walking)    Respiratory: (cough, wheezing, snoring, SOB with activity (dyspnea), SOB while lying flat (orthopnea), awakening with severe SOB (paroxysmal nocturnal dyspnea))    Gastrointestinal: (NVD, constipation, abdominal pain, bright red stools, black tarry stools, stool incontinence)     Genitourinary:  (pelvic pain, burning or frequency of urination, urinary urgency, blood in urine incomplete bladder emptying, urinary incontinence, STD; MEN: testicular pain or swelling, erectile dysfunction; WOMEN: LMP, heavy menstrual bleeding (menorrhagia), irregular periods, postmenopausal bleeding, menstrual pain (dymenorrhea, vaginal

## 2024-04-10 DIAGNOSIS — D63.1 ANEMIA IN END-STAGE RENAL DISEASE (HCC): ICD-10-CM

## 2024-04-10 DIAGNOSIS — N18.6 TYPE 2 DIABETES MELLITUS WITH END-STAGE RENAL DISEASE (HCC): ICD-10-CM

## 2024-04-10 DIAGNOSIS — E03.9 ACQUIRED HYPOTHYROIDISM: ICD-10-CM

## 2024-04-10 DIAGNOSIS — E78.1 HYPERTRIGLYCERIDEMIA: ICD-10-CM

## 2024-04-10 DIAGNOSIS — E11.22 TYPE 2 DIABETES MELLITUS WITH END-STAGE RENAL DISEASE (HCC): ICD-10-CM

## 2024-04-10 DIAGNOSIS — K76.9 DISEASE OF LIVER: ICD-10-CM

## 2024-04-10 DIAGNOSIS — N12 PYELONEPHRITIS: ICD-10-CM

## 2024-04-10 DIAGNOSIS — E55.9 VITAMIN D DEFICIENCY: ICD-10-CM

## 2024-04-10 DIAGNOSIS — I10 PRIMARY HYPERTENSION: ICD-10-CM

## 2024-04-10 DIAGNOSIS — I10 PRIMARY HYPERTENSION: Primary | ICD-10-CM

## 2024-04-10 DIAGNOSIS — N18.6 ANEMIA IN END-STAGE RENAL DISEASE (HCC): ICD-10-CM

## 2024-04-10 LAB
25(OH)D3 SERPL-MCNC: 41.2 NG/ML
ALBUMIN SERPL-MCNC: 3.9 G/DL (ref 3.5–5.2)
ALP SERPL-CCNC: 104 U/L (ref 35–104)
ALT SERPL-CCNC: 14 U/L (ref 5–33)
ANION GAP SERPL CALCULATED.3IONS-SCNC: 21 MMOL/L (ref 7–19)
AST SERPL-CCNC: 9 U/L (ref 5–32)
BILIRUB DIRECT SERPL-MCNC: 0.2 MG/DL (ref 0–0.3)
BILIRUB INDIRECT SERPL-MCNC: 0.2 MG/DL (ref 0.1–1)
BILIRUB SERPL-MCNC: 0.4 MG/DL (ref 0.2–1.2)
BUN SERPL-MCNC: 79 MG/DL (ref 8–23)
CALCIUM SERPL-MCNC: 9.9 MG/DL (ref 8.8–10.2)
CHLORIDE SERPL-SCNC: 99 MMOL/L (ref 98–111)
CHOLEST SERPL-MCNC: 98 MG/DL (ref 160–199)
CO2 SERPL-SCNC: 23 MMOL/L (ref 22–29)
CREAT SERPL-MCNC: 10.7 MG/DL (ref 0.5–0.9)
ERYTHROCYTE [DISTWIDTH] IN BLOOD BY AUTOMATED COUNT: 15.9 % (ref 11.5–14.5)
GLUCOSE SERPL-MCNC: 96 MG/DL (ref 74–109)
HBA1C MFR BLD: 4.6 % (ref 4–6)
HCT VFR BLD AUTO: 39.1 % (ref 37–47)
HDLC SERPL-MCNC: 31 MG/DL (ref 65–121)
HGB BLD-MCNC: 12.1 G/DL (ref 12–16)
LDLC SERPL CALC-MCNC: 29 MG/DL
MCH RBC QN AUTO: 32.6 PG (ref 27–31)
MCHC RBC AUTO-ENTMCNC: 30.9 G/DL (ref 33–37)
MCV RBC AUTO: 105.4 FL (ref 81–99)
PLATELET # BLD AUTO: 186 K/UL (ref 130–400)
PMV BLD AUTO: 10 FL (ref 9.4–12.3)
POTASSIUM SERPL-SCNC: 5.4 MMOL/L (ref 3.5–5)
PROT SERPL-MCNC: 7.1 G/DL (ref 6.6–8.7)
RBC # BLD AUTO: 3.71 M/UL (ref 4.2–5.4)
SODIUM SERPL-SCNC: 143 MMOL/L (ref 136–145)
TRIGL SERPL-MCNC: 188 MG/DL (ref 0–149)
TSH SERPL DL<=0.005 MIU/L-ACNC: 1.72 UIU/ML (ref 0.27–4.2)
WBC # BLD AUTO: 7.5 K/UL (ref 4.8–10.8)

## 2024-04-11 DIAGNOSIS — G47.09 OTHER INSOMNIA: ICD-10-CM

## 2024-04-11 RX ORDER — POTASSIUM CHLORIDE 20 MEQ/1
20 TABLET, EXTENDED RELEASE ORAL DAILY
Qty: 30 TABLET | Refills: 5 | Status: SHIPPED | OUTPATIENT
Start: 2024-04-11

## 2024-04-11 RX ORDER — TRAZODONE HYDROCHLORIDE 100 MG/1
100 TABLET ORAL NIGHTLY
Qty: 90 TABLET | Refills: 1 | Status: SHIPPED | OUTPATIENT
Start: 2024-04-11 | End: 2024-10-08

## 2024-04-11 NOTE — TELEPHONE ENCOUNTER
Sara Pardo called to request a refill on her medication.      Last office visit : 11/7/2023   Next office visit : 4/16/2024     Requested Prescriptions     Pending Prescriptions Disp Refills    traZODone (DESYREL) 100 MG tablet [Pharmacy Med Name: TRAZODONE 100 MG TABLET] 90 tablet 11     Sig: Take 1 tablet by mouth nightly    potassium chloride (KLOR-CON M) 20 MEQ extended release tablet [Pharmacy Med Name: POTASSIUM CL ER 20 MEQ TAB*MARIAH] 30 tablet 11     Sig: TAKE ONE TABLET BY MOUTH EVERY DAY            Roxy Scott LPN

## 2024-04-15 ENCOUNTER — OFFICE VISIT (OUTPATIENT)
Dept: BARIATRICS/WEIGHT MGMT | Facility: CLINIC | Age: 64
End: 2024-04-15
Payer: MEDICARE

## 2024-04-15 VITALS
DIASTOLIC BLOOD PRESSURE: 62 MMHG | SYSTOLIC BLOOD PRESSURE: 95 MMHG | BODY MASS INDEX: 48.82 KG/M2 | OXYGEN SATURATION: 96 % | WEIGHT: 293 LBS | HEART RATE: 93 BPM | TEMPERATURE: 98.7 F | HEIGHT: 65 IN

## 2024-04-15 DIAGNOSIS — E66.01 CLASS 3 SEVERE OBESITY DUE TO EXCESS CALORIES WITH SERIOUS COMORBIDITY AND BODY MASS INDEX (BMI) OF 50.0 TO 59.9 IN ADULT: Primary | ICD-10-CM

## 2024-04-15 RX ORDER — POTASSIUM CHLORIDE 20 MEQ/1
TABLET, EXTENDED RELEASE ORAL
COMMUNITY
Start: 2024-04-11

## 2024-04-15 RX ORDER — DIAZEPAM 2 MG/1
1 TABLET ORAL 3 TIMES DAILY
COMMUNITY

## 2024-04-15 RX ORDER — LAMOTRIGINE 100 MG/1
TABLET ORAL
COMMUNITY

## 2024-04-15 RX ORDER — LISINOPRIL 20 MG/1
TABLET ORAL 2 TIMES DAILY
COMMUNITY

## 2024-04-15 RX ORDER — BUPROPION HYDROCHLORIDE 100 MG/1
TABLET, EXTENDED RELEASE ORAL
COMMUNITY
Start: 2024-02-05

## 2024-04-15 NOTE — PROGRESS NOTES
needed for Rhinitis Yes Deepali Gilliland MD   traZODone (DESYREL) 100 MG tablet Take 1 tablet by mouth nightly Yes Deepali Gilliland MD   potassium chloride (KLOR-CON M) 20 MEQ extended release tablet TAKE ONE TABLET BY MOUTH EVERY DAY Yes Deepali Gilliland MD   Cobalamin Combinations (B-12) 100-5000 MCG SUBL Place 1 capsule under the tongue daily Yes Deepali Gilliland MD   famotidine (PEPCID) 10 MG tablet Take 1 tablet by mouth nightly as needed (gerd) Yes Deepali Gilliland MD   buPROPion (WELLBUTRIN SR) 100 MG extended release tablet TAKE ONE TABLET BY MOUTH 2 TIMES A DAY Yes Deepali Gilliland MD   Insulin Disposable Pump (OMNIPOD 5 G6 POD, GEN 5,) MISC  Yes Aspen Rosa MD   losartan (COZAAR) 50 MG tablet Take 1 tablet by mouth Yes ProviderAspen MD   sevelamer (RENVELA) 800 MG tablet TAKE 4 TABLETS BY MOUTH THREE TIMES DAILY WITH MEALS AND 2 TABLETS WITH A SNACK Yes Aspen Rosa MD   levothyroxine (SYNTHROID) 112 MCG tablet Take 1 tablet by mouth Daily Yes Deepali Gilliland MD   rosuvastatin (CRESTOR) 20 MG tablet Take 1 tablet by mouth daily Yes Deepali Gilliland MD   citalopram (CELEXA) 40 MG tablet Take 1 tablet by mouth daily Yes Deepali Gilliland MD   carvedilol (COREG) 3.125 MG tablet Take 1 tablet by mouth 2 times daily Yes Deepali Gilliland MD   Semaglutide,0.25 or 0.5MG/DOS, (OZEMPIC, 0.25 OR 0.5 MG/DOSE,) 2 MG/1.5ML SOPN Inject 2 mg into the skin once a week Yes Aspen Rosa MD   Insulin Regular Human (HUMULIN R U-500 KWIKPEN SC) Inject into the skin OMNI POD 1 UNIT PER HOUR AND BOLUS 25 UNITS Q AM, 15 UNITS AT HS Yes Aspen Rosa MD   vitamin C (ASCORBIC ACID) 500 MG tablet Take 1 tablet by mouth 2 times daily Yes ProviderAspen MD   calcitRIOL (ROCALTROL) 0.5 MCG capsule Take 1 capsule by mouth daily Yes Provider, MD Aspen   Evolocumab (REPATHA SC) Inject into the skin every 14 days Yes Provider,

## 2024-04-16 ENCOUNTER — OFFICE VISIT (OUTPATIENT)
Dept: PRIMARY CARE CLINIC | Age: 64
End: 2024-04-16

## 2024-04-16 VITALS
WEIGHT: 293 LBS | TEMPERATURE: 97.1 F | DIASTOLIC BLOOD PRESSURE: 62 MMHG | OXYGEN SATURATION: 98 % | RESPIRATION RATE: 18 BRPM | HEART RATE: 67 BPM | BODY MASS INDEX: 48.82 KG/M2 | SYSTOLIC BLOOD PRESSURE: 110 MMHG | HEIGHT: 65 IN

## 2024-04-16 DIAGNOSIS — E03.9 ACQUIRED HYPOTHYROIDISM: ICD-10-CM

## 2024-04-16 DIAGNOSIS — Z00.00 MEDICARE ANNUAL WELLNESS VISIT, SUBSEQUENT: ICD-10-CM

## 2024-04-16 DIAGNOSIS — E66.01 MORBID OBESITY (HCC): ICD-10-CM

## 2024-04-16 DIAGNOSIS — Z99.2 ESRD ON PERITONEAL DIALYSIS (HCC): ICD-10-CM

## 2024-04-16 DIAGNOSIS — I35.1 NONRHEUMATIC AORTIC VALVE INSUFFICIENCY: ICD-10-CM

## 2024-04-16 DIAGNOSIS — J30.9 ALLERGIC RHINITIS WITH POSTNASAL DRIP: Primary | ICD-10-CM

## 2024-04-16 DIAGNOSIS — E11.22 TYPE 2 DIABETES MELLITUS WITH END-STAGE RENAL DISEASE (HCC): ICD-10-CM

## 2024-04-16 DIAGNOSIS — N18.6 TYPE 2 DIABETES MELLITUS WITH END-STAGE RENAL DISEASE (HCC): ICD-10-CM

## 2024-04-16 DIAGNOSIS — G47.33 OSA ON CPAP: ICD-10-CM

## 2024-04-16 DIAGNOSIS — Z12.31 ENCOUNTER FOR SCREENING MAMMOGRAM FOR BREAST CANCER: ICD-10-CM

## 2024-04-16 DIAGNOSIS — F41.9 ANXIETY: ICD-10-CM

## 2024-04-16 DIAGNOSIS — I10 PRIMARY HYPERTENSION: ICD-10-CM

## 2024-04-16 DIAGNOSIS — N18.6 ESRD ON PERITONEAL DIALYSIS (HCC): ICD-10-CM

## 2024-04-16 DIAGNOSIS — R09.82 ALLERGIC RHINITIS WITH POSTNASAL DRIP: Primary | ICD-10-CM

## 2024-04-16 DIAGNOSIS — E53.8 B12 DEFICIENCY: ICD-10-CM

## 2024-04-16 PROBLEM — E83.30 DISORDER OF PHOSPHORUS METABOLISM, UNSPECIFIED: Status: RESOLVED | Noted: 2021-08-13 | Resolved: 2024-04-16

## 2024-04-16 PROBLEM — D64.9 ANEMIA: Status: RESOLVED | Noted: 2017-02-26 | Resolved: 2024-04-16

## 2024-04-16 PROBLEM — E07.9 THYROID DISEASE: Status: RESOLVED | Noted: 2017-02-26 | Resolved: 2024-04-16

## 2024-04-16 PROBLEM — E11.40 TYPE 2 DIABETES MELLITUS WITH DIABETIC NEUROPATHY, UNSPECIFIED (HCC): Status: RESOLVED | Noted: 2021-08-13 | Resolved: 2024-04-16

## 2024-04-16 PROCEDURE — G0439 PPPS, SUBSEQ VISIT: HCPCS | Performed by: INTERNAL MEDICINE

## 2024-04-16 PROCEDURE — 3074F SYST BP LT 130 MM HG: CPT | Performed by: INTERNAL MEDICINE

## 2024-04-16 PROCEDURE — 3078F DIAST BP <80 MM HG: CPT | Performed by: INTERNAL MEDICINE

## 2024-04-16 PROCEDURE — 3044F HG A1C LEVEL LT 7.0%: CPT | Performed by: INTERNAL MEDICINE

## 2024-04-16 PROCEDURE — 3017F COLORECTAL CA SCREEN DOC REV: CPT | Performed by: INTERNAL MEDICINE

## 2024-04-16 RX ORDER — FLUTICASONE PROPIONATE 50 MCG
2 SPRAY, SUSPENSION (ML) NASAL DAILY PRN
Qty: 16 G | Refills: 3 | Status: SHIPPED | OUTPATIENT
Start: 2024-04-16

## 2024-04-16 SDOH — ECONOMIC STABILITY: FOOD INSECURITY: WITHIN THE PAST 12 MONTHS, THE FOOD YOU BOUGHT JUST DIDN'T LAST AND YOU DIDN'T HAVE MONEY TO GET MORE.: SOMETIMES TRUE

## 2024-04-16 SDOH — ECONOMIC STABILITY: INCOME INSECURITY: HOW HARD IS IT FOR YOU TO PAY FOR THE VERY BASICS LIKE FOOD, HOUSING, MEDICAL CARE, AND HEATING?: SOMEWHAT HARD

## 2024-04-16 SDOH — ECONOMIC STABILITY: FOOD INSECURITY: WITHIN THE PAST 12 MONTHS, YOU WORRIED THAT YOUR FOOD WOULD RUN OUT BEFORE YOU GOT MONEY TO BUY MORE.: SOMETIMES TRUE

## 2024-04-16 ASSESSMENT — ENCOUNTER SYMPTOMS
ABDOMINAL PAIN: 0
VOMITING: 0
CHEST TIGHTNESS: 0
SHORTNESS OF BREATH: 1
BACK PAIN: 0
BLOOD IN STOOL: 0
SINUS PAIN: 0
WHEEZING: 0
DIARRHEA: 0
APNEA: 0
COUGH: 0
SORE THROAT: 0
CONSTIPATION: 0
TROUBLE SWALLOWING: 0
NAUSEA: 0
RHINORRHEA: 0
ABDOMINAL DISTENTION: 0

## 2024-04-16 ASSESSMENT — PATIENT HEALTH QUESTIONNAIRE - PHQ9
4. FEELING TIRED OR HAVING LITTLE ENERGY: MORE THAN HALF THE DAYS
8. MOVING OR SPEAKING SO SLOWLY THAT OTHER PEOPLE COULD HAVE NOTICED. OR THE OPPOSITE, BEING SO FIGETY OR RESTLESS THAT YOU HAVE BEEN MOVING AROUND A LOT MORE THAN USUAL: NOT AT ALL
1. LITTLE INTEREST OR PLEASURE IN DOING THINGS: SEVERAL DAYS
6. FEELING BAD ABOUT YOURSELF - OR THAT YOU ARE A FAILURE OR HAVE LET YOURSELF OR YOUR FAMILY DOWN: SEVERAL DAYS
SUM OF ALL RESPONSES TO PHQ QUESTIONS 1-9: 7
SUM OF ALL RESPONSES TO PHQ QUESTIONS 1-9: 7
9. THOUGHTS THAT YOU WOULD BE BETTER OFF DEAD, OR OF HURTING YOURSELF: NOT AT ALL
10. IF YOU CHECKED OFF ANY PROBLEMS, HOW DIFFICULT HAVE THESE PROBLEMS MADE IT FOR YOU TO DO YOUR WORK, TAKE CARE OF THINGS AT HOME, OR GET ALONG WITH OTHER PEOPLE: SOMEWHAT DIFFICULT
5. POOR APPETITE OR OVEREATING: NOT AT ALL
7. TROUBLE CONCENTRATING ON THINGS, SUCH AS READING THE NEWSPAPER OR WATCHING TELEVISION: NEARLY EVERY DAY
3. TROUBLE FALLING OR STAYING ASLEEP: NOT AT ALL
SUM OF ALL RESPONSES TO PHQ9 QUESTIONS 1 & 2: 1
SUM OF ALL RESPONSES TO PHQ QUESTIONS 1-9: 7
2. FEELING DOWN, DEPRESSED OR HOPELESS: NOT AT ALL
SUM OF ALL RESPONSES TO PHQ QUESTIONS 1-9: 7

## 2024-04-16 NOTE — ASSESSMENT & PLAN NOTE
Monitored by specialist- no acute findings meriting change in the plan  Has early morning lows  She ran out of her self-monitoring device she was advised to discuss her numbers with her endocrinologist

## 2024-04-16 NOTE — PATIENT INSTRUCTIONS
[facility] Resources*    Emergency Shelter Assistance    [facility] “[facility]” - for women and children.  [address], [place], [place] [address]  Website: [email or web address].org/emergency-shelter/  [phone]    [facility]- for single men or  couples and children.   [address] [place], [place] [address]  Website: https://[facility].org/  [phone]    [facility]- for women and children.  [place], [place]   Website: http://www.[employer]/  [phone]    [facility] “[place]”- for [facility] residents with custody of children  [address], [place] [address]  [phone]    [facility]- for single men  Website: https://[email or web address]/  [address] [address]  [phone]    [facility]- for women and children fleeing domestic violence  Website: https://[facility].org  [place], [place]   24-hour crisis line- 4.904.492.3449 -or- [phone]    [place] [employer] [facility][1]- contacts familiar with local resources   Resource Guide: https://[facility].org/Programs/Documents/Community%20Resource%20Guide.pdf  [address], [place] [address]  [phone] ext [phone] -or- [phone] ext [phone]    [place] [facility] ([facility])- You may call [facility] to ask for help in finding shelter when you do not have other options, such as family or friends, or is the local shelters are full.   Local Phone [phone] (Open Monday through Friday 8:00am to 4:00pm)  Website: https://www.[facility].ky.gov/agencies/dcbs/dpp/apb  24 Hour HOTLINE: 3.302.625.3209    [place] [employer]  Website: https://www.[facility].gov/[facility]/[place]/homeless/shelters#all   [address] [place] [address]  1 (800) 633-8896 x [phone]  [phone] x [phone]    [facility]- Emergency Shelter Program - Emergency food, clothing, and housing

## 2024-04-16 NOTE — ASSESSMENT & PLAN NOTE
Uncontrolled, continue current medications and lifestyle modifications recommended  Referred to bariatric surgery for evaluation however patient was discharged due to noncompliance

## 2024-04-16 NOTE — ASSESSMENT & PLAN NOTE
Asymptomatic, medication adherence emphasized and lifestyle modifications recommended  Patient was instructed to perform dialysis 5 times a week and she barely gets 2 to normal she might have to go to hemodialysis just, hemohemodialysis due to nonadherence to regimen

## 2024-04-18 ENCOUNTER — TRANSCRIBE ORDERS (OUTPATIENT)
Dept: ADMINISTRATIVE | Facility: HOSPITAL | Age: 64
End: 2024-04-18
Payer: MEDICARE

## 2024-04-18 DIAGNOSIS — Z12.31 SCREENING MAMMOGRAM FOR BREAST CANCER: Primary | ICD-10-CM

## 2024-04-18 NOTE — PROGRESS NOTES
"Patient Care Team:  Fatmata Wallace MD as PCP - General (Internal Medicine)  Beverly England (Inactive) as Technician  Beverly England (Inactive) as Technician  Beverly England (Inactive) as Technician  Gregorio Davis MD as Consulting Physician (Nephrology)  Sola Mallory APRN as Nurse Practitioner (Hematology and Oncology)  Wilman Negrete MD as Consulting Physician (Endocrinology)  Garrett Denton MD as Consulting Physician (Otolaryngology)  Vicky Terrell APRN as Nurse Practitioner (Nurse Practitioner)  Kp Vo OD as Consulting Physician (Optometry)  Mono Linder MD as Consulting Physician (Gastroenterology)    Reason for Visit:  Medical Weight loss    Subjective   Maria C Shrestha is a 64 y.o. female.     Maria C is here for follow-up and continued medical management of her morbid obesity.  She continues to struggle with following the prescription meal plan.  She states that she has a lot of personal stressors due to needing to find another home and her  does not support her with her weight loss journey.  She states that she is unable to purchase foods that she needs and often foods that are cooked and not compliant with the meal plan.    Review Of Systems:  Review of Systems   Constitutional:  Positive for fatigue.   Respiratory: Negative.     Cardiovascular: Negative.    Gastrointestinal: Negative.    Endocrine: Negative.    Musculoskeletal:  Positive for arthralgias and myalgias.   Psychiatric/Behavioral:  Positive for dysphoric mood.          The following portions of the patient's history were reviewed and updated as appropriate: allergies, current medications, past family history, past medical history, past social history, past surgical history, and problem list.    Objective   BP 95/62 (BP Location: Right arm, Patient Position: Sitting, Cuff Size: Adult)   Pulse 93   Temp 98.7 °F (37.1 °C)   Ht 164.5 cm (64.75\")   Wt (!) 136 kg (300 lb 9.6 oz) "   LMP  (LMP Unknown)   SpO2 96%   BMI 50.41 kg/m²       04/15/24  1426   Weight: (!) 136 kg (300 lb 9.6 oz)            Physical Exam  Vitals reviewed.   Constitutional:       Appearance: She is obese.   Cardiovascular:      Rate and Rhythm: Normal rate and regular rhythm.   Pulmonary:      Effort: Pulmonary effort is normal.   Musculoskeletal:         General: Normal range of motion.   Neurological:      Mental Status: She is alert and oriented to person, place, and time.   Psychiatric:         Mood and Affect: Mood normal.         Behavior: Behavior normal.           Assessment & Plan   Diagnoses and all orders for this visit:    1. Class 3 severe obesity due to excess calories with serious comorbidity and body mass index (BMI) of 50.0 to 59.9 in adult (Primary)           Maria C Shrestha was seen today for follow-up, obesity, nutrition counseling and weight loss.    Today we discussed healthy changes in lifestyle, diet, and exercise. Dietician consultation obtained.  Maria C Shrestha had received handouts to her explaining the recommendation on portion sizes/appetite control/reading nutrition labels.   Intensive behavioral therapy for obesity was done today as well.     I explained to patient that our program may not be a correct fit for her.  I have advised her to consider working with psychiatry and looking into other ways to have access to food that is compliant on the prescription meal plan.  Patient is agreeable and states at this time she would like to stop the program and would return if the household dynamics changed or she had more access to better food.

## 2024-04-22 DIAGNOSIS — E03.9 ACQUIRED HYPOTHYROIDISM: ICD-10-CM

## 2024-04-22 RX ORDER — LEVOTHYROXINE SODIUM 112 UG/1
112 TABLET ORAL DAILY
Qty: 90 TABLET | Refills: 1 | Status: SHIPPED | OUTPATIENT
Start: 2024-04-22 | End: 2024-10-19

## 2024-04-22 NOTE — TELEPHONE ENCOUNTER
Sara Pardo called to request a refill on her medication.      Last office visit : 4/16/2024   Next office visit : 10/14/2024     Requested Prescriptions     Pending Prescriptions Disp Refills    levothyroxine (SYNTHROID) 112 MCG tablet [Pharmacy Med Name: LEVOTHYROXINE 112 MCG TABLET] 90 tablet 0     Sig: Take 1 tablet by mouth Daily            Roxy Scott LPN

## 2024-04-24 ENCOUNTER — LAB (OUTPATIENT)
Dept: LAB | Facility: HOSPITAL | Age: 64
End: 2024-04-24
Payer: MEDICARE

## 2024-04-24 ENCOUNTER — TRANSCRIBE ORDERS (OUTPATIENT)
Dept: ADMINISTRATIVE | Facility: HOSPITAL | Age: 64
End: 2024-04-24
Payer: MEDICARE

## 2024-04-24 DIAGNOSIS — E11.649 TYPE 2 DIABETES MELLITUS WITH HYPOGLYCEMIA WITHOUT COMA, UNSPECIFIED WHETHER LONG TERM INSULIN USE: Primary | ICD-10-CM

## 2024-04-24 DIAGNOSIS — E55.9 VITAMIN D DEFICIENCY: ICD-10-CM

## 2024-04-24 DIAGNOSIS — E78.5 DYSLIPIDEMIA: ICD-10-CM

## 2024-04-24 DIAGNOSIS — E03.9 ACQUIRED HYPOTHYROIDISM: ICD-10-CM

## 2024-04-24 DIAGNOSIS — I10 ESSENTIAL HYPERTENSION: ICD-10-CM

## 2024-04-24 DIAGNOSIS — E53.8 B12 DEFICIENCY: ICD-10-CM

## 2024-04-24 DIAGNOSIS — E11.649 TYPE 2 DIABETES MELLITUS WITH HYPOGLYCEMIA WITHOUT COMA, UNSPECIFIED WHETHER LONG TERM INSULIN USE: ICD-10-CM

## 2024-04-24 LAB
ALBUMIN SERPL-MCNC: 4.2 G/DL (ref 3.5–5)
ALBUMIN/GLOB SERPL: 1.2 G/DL (ref 1.1–2.5)
ALP SERPL-CCNC: 94 U/L (ref 24–120)
ALT SERPL W P-5'-P-CCNC: 17 U/L (ref 0–35)
ANION GAP SERPL CALCULATED.3IONS-SCNC: 12 MMOL/L (ref 4–13)
AST SERPL-CCNC: 17 U/L (ref 7–45)
AUTO MIXED CELLS #: 1 10*3/MM3 (ref 0.1–2.6)
AUTO MIXED CELLS %: 12.2 % (ref 0.1–24)
BACTERIA UR QL AUTO: ABNORMAL /HPF
BILIRUB SERPL-MCNC: 0.4 MG/DL (ref 0.1–1)
BILIRUB UR QL STRIP: NEGATIVE
BUN SERPL-MCNC: 65 MG/DL (ref 5–21)
BUN/CREAT SERPL: 6.3
CALCIUM SPEC-SCNC: 9.9 MG/DL (ref 8.4–10.4)
CHLORIDE SERPL-SCNC: 96 MMOL/L (ref 98–110)
CHOLEST SERPL-MCNC: 53 MG/DL (ref 130–200)
CLARITY UR: CLEAR
CO2 SERPL-SCNC: 26 MMOL/L (ref 24–31)
COLOR UR: YELLOW
CREAT SERPL-MCNC: 10.26 MG/DL (ref 0.5–1.4)
EGFRCR SERPLBLD CKD-EPI 2021: 3.9 ML/MIN/1.73
ERYTHROCYTE [DISTWIDTH] IN BLOOD BY AUTOMATED COUNT: 14.3 % (ref 12.3–15.4)
GLOBULIN UR ELPH-MCNC: 3.6 GM/DL
GLUCOSE SERPL-MCNC: 63 MG/DL (ref 70–100)
GLUCOSE UR STRIP-MCNC: NEGATIVE MG/DL
HBA1C MFR BLD: 5.2 % (ref 4.8–5.9)
HCT VFR BLD AUTO: 36.4 % (ref 34–46.6)
HDLC SERPL-MCNC: 17 MG/DL
HGB BLD-MCNC: 11.8 G/DL (ref 12–15.9)
HGB UR QL STRIP.AUTO: ABNORMAL
HYALINE CASTS UR QL AUTO: ABNORMAL /LPF
KETONES UR QL STRIP: NEGATIVE
LDLC SERPL CALC-MCNC: 13 MG/DL (ref 0–99)
LDLC/HDLC SERPL: 0.66 {RATIO}
LEUKOCYTE ESTERASE UR QL STRIP.AUTO: ABNORMAL
LYMPHOCYTES # BLD AUTO: 1.6 10*3/MM3 (ref 0.7–3.1)
LYMPHOCYTES NFR BLD AUTO: 19.3 % (ref 19.6–45.3)
MCH RBC QN AUTO: 32.1 PG (ref 26.6–33)
MCHC RBC AUTO-ENTMCNC: 32.4 G/DL (ref 31.5–35.7)
MCV RBC AUTO: 98.9 FL (ref 79–97)
NEUTROPHILS NFR BLD AUTO: 5.9 10*3/MM3 (ref 1.7–7)
NEUTROPHILS NFR BLD AUTO: 68.5 % (ref 42.7–76)
NITRITE UR QL STRIP: NEGATIVE
PH UR STRIP.AUTO: 8 [PH] (ref 5–8)
PLATELET # BLD AUTO: 183 10*3/MM3 (ref 140–450)
PMV BLD AUTO: 8.6 FL (ref 6–12)
POTASSIUM SERPL-SCNC: 4.9 MMOL/L (ref 3.5–5.3)
PROT SERPL-MCNC: 7.8 G/DL (ref 6.3–8.7)
PROT UR QL STRIP: ABNORMAL
RBC # BLD AUTO: 3.68 10*6/MM3 (ref 3.77–5.28)
RBC # UR STRIP: ABNORMAL /HPF
REF LAB TEST METHOD: ABNORMAL
SODIUM SERPL-SCNC: 134 MMOL/L (ref 135–145)
SP GR UR STRIP: 1.01 (ref 1–1.03)
SQUAMOUS #/AREA URNS HPF: ABNORMAL /HPF
TRIGL SERPL-MCNC: 124 MG/DL (ref 0–149)
UROBILINOGEN UR QL STRIP: ABNORMAL
VLDLC SERPL-MCNC: 23 MG/DL (ref 5–40)
WBC # UR STRIP: ABNORMAL /HPF
WBC NRBC COR # BLD AUTO: 8.5 10*3/MM3 (ref 3.4–10.8)

## 2024-04-24 PROCEDURE — 82306 VITAMIN D 25 HYDROXY: CPT

## 2024-04-24 PROCEDURE — 83036 HEMOGLOBIN GLYCOSYLATED A1C: CPT | Performed by: INTERNAL MEDICINE

## 2024-04-24 PROCEDURE — 82607 VITAMIN B-12: CPT

## 2024-04-24 PROCEDURE — 84443 ASSAY THYROID STIM HORMONE: CPT

## 2024-04-24 PROCEDURE — 84100 ASSAY OF PHOSPHORUS: CPT

## 2024-04-24 PROCEDURE — 36415 COLL VENOUS BLD VENIPUNCTURE: CPT

## 2024-04-24 PROCEDURE — 87086 URINE CULTURE/COLONY COUNT: CPT

## 2024-04-24 PROCEDURE — 80061 LIPID PANEL: CPT

## 2024-04-24 PROCEDURE — 80053 COMPREHEN METABOLIC PANEL: CPT | Performed by: INTERNAL MEDICINE

## 2024-04-24 PROCEDURE — 82043 UR ALBUMIN QUANTITATIVE: CPT

## 2024-04-24 PROCEDURE — 85025 COMPLETE CBC W/AUTO DIFF WBC: CPT

## 2024-04-24 PROCEDURE — 81001 URINALYSIS AUTO W/SCOPE: CPT

## 2024-04-25 LAB
ALBUMIN UR-MCNC: 35.7 MG/DL
BACTERIA SPEC AEROBE CULT: NORMAL
PHOSPHATE SERPL-MCNC: 5 MG/DL (ref 2.5–4.5)
TSH SERPL DL<=0.05 MIU/L-ACNC: 1.23 UIU/ML (ref 0.27–4.2)
VIT B12 BLD-MCNC: 1313 PG/ML (ref 211–946)

## 2024-04-28 ENCOUNTER — APPOINTMENT (OUTPATIENT)
Dept: CT IMAGING | Age: 64
End: 2024-04-28
Payer: MEDICARE

## 2024-04-28 ENCOUNTER — HOSPITAL ENCOUNTER (EMERGENCY)
Age: 64
Discharge: HOME | End: 2024-04-28
Payer: MEDICARE

## 2024-04-28 VITALS
HEART RATE: 68 BPM | HEIGHT: 65 IN | DIASTOLIC BLOOD PRESSURE: 49 MMHG | SYSTOLIC BLOOD PRESSURE: 108 MMHG | TEMPERATURE: 98 F | RESPIRATION RATE: 16 BRPM | BODY MASS INDEX: 48.82 KG/M2 | OXYGEN SATURATION: 99 % | WEIGHT: 293 LBS

## 2024-04-28 DIAGNOSIS — R20.0 NUMBNESS OF TONGUE: Primary | ICD-10-CM

## 2024-04-28 LAB
ALBUMIN SERPL-MCNC: 4 G/DL (ref 3.5–5.2)
ALP SERPL-CCNC: 103 U/L (ref 35–104)
ALT SERPL-CCNC: 22 U/L (ref 5–33)
ANION GAP SERPL CALCULATED.3IONS-SCNC: 15 MMOL/L (ref 7–19)
AST SERPL-CCNC: 12 U/L (ref 5–32)
BACTERIA #/AREA URNS HPF: ABNORMAL /HPF
BASOPHILS # BLD: 0 K/UL (ref 0–0.2)
BASOPHILS NFR BLD: 0.4 % (ref 0–1)
BILIRUB SERPL-MCNC: 0.4 MG/DL (ref 0.2–1.2)
BILIRUB UR QL STRIP: NEGATIVE
BUN SERPL-MCNC: 49 MG/DL (ref 8–23)
CALCIUM SERPL-MCNC: 9.6 MG/DL (ref 8.8–10.2)
CHLORIDE SERPL-SCNC: 97 MMOL/L (ref 98–111)
CLARITY UR: ABNORMAL
CO2 SERPL-SCNC: 27 MMOL/L (ref 22–29)
COLOR UR: YELLOW
CREAT SERPL-MCNC: 7.2 MG/DL (ref 0.5–0.9)
EOSINOPHIL # BLD: 0.4 K/UL (ref 0–0.6)
EOSINOPHIL NFR BLD: 3.8 % (ref 0–5)
ERYTHROCYTE [DISTWIDTH] IN BLOOD BY AUTOMATED COUNT: 14.6 % (ref 11.5–14.5)
GLUCOSE BLD-MCNC: 92 MG/DL (ref 70–99)
GLUCOSE SERPL-MCNC: 75 MG/DL (ref 74–109)
GLUCOSE UR STRIP.AUTO-MCNC: NEGATIVE MG/DL
HCT VFR BLD AUTO: 37.4 % (ref 37–47)
HGB BLD-MCNC: 11.8 G/DL (ref 12–16)
HGB UR STRIP.AUTO-MCNC: NEGATIVE MG/L
IMM GRANULOCYTES # BLD: 0 K/UL
KETONES UR STRIP.AUTO-MCNC: NEGATIVE MG/DL
LEUKOCYTE ESTERASE UR QL STRIP.AUTO: ABNORMAL
LYMPHOCYTES # BLD: 1.7 K/UL (ref 1.1–4.5)
LYMPHOCYTES NFR BLD: 18 % (ref 20–40)
MCH RBC QN AUTO: 32.4 PG (ref 27–31)
MCHC RBC AUTO-ENTMCNC: 31.6 G/DL (ref 33–37)
MCV RBC AUTO: 102.7 FL (ref 81–99)
MONOCYTES # BLD: 0.7 K/UL (ref 0–0.9)
MONOCYTES NFR BLD: 6.8 % (ref 0–10)
NEUTROPHILS # BLD: 6.8 K/UL (ref 1.5–7.5)
NEUTS SEG NFR BLD: 70.6 % (ref 50–65)
NITRITE UR QL STRIP.AUTO: NEGATIVE
PERFORMED ON: NORMAL
PH UR STRIP.AUTO: 8.5 [PH] (ref 5–8)
PLATELET # BLD AUTO: 171 K/UL (ref 130–400)
PMV BLD AUTO: 9.9 FL (ref 9.4–12.3)
POTASSIUM SERPL-SCNC: 4.7 MMOL/L (ref 3.5–5)
PROT SERPL-MCNC: 7.1 G/DL (ref 6.6–8.7)
PROT UR STRIP.AUTO-MCNC: 300 MG/DL
RBC # BLD AUTO: 3.64 M/UL (ref 4.2–5.4)
RBC #/AREA URNS HPF: ABNORMAL /HPF (ref 0–2)
SODIUM SERPL-SCNC: 139 MMOL/L (ref 136–145)
SP GR UR STRIP.AUTO: 1.01 (ref 1–1.03)
SQUAMOUS #/AREA URNS HPF: >100 /HPF
UROBILINOGEN UR STRIP.AUTO-MCNC: 0.2 E.U./DL
WBC # BLD AUTO: 9.7 K/UL (ref 4.8–10.8)
WBC #/AREA URNS HPF: ABNORMAL /HPF (ref 0–5)

## 2024-04-28 PROCEDURE — 36415 COLL VENOUS BLD VENIPUNCTURE: CPT

## 2024-04-28 PROCEDURE — 81001 URINALYSIS AUTO W/SCOPE: CPT

## 2024-04-28 PROCEDURE — 80053 COMPREHEN METABOLIC PANEL: CPT

## 2024-04-28 PROCEDURE — 99284 EMERGENCY DEPT VISIT MOD MDM: CPT

## 2024-04-28 PROCEDURE — 82962 GLUCOSE BLOOD TEST: CPT

## 2024-04-28 PROCEDURE — 70450 CT HEAD/BRAIN W/O DYE: CPT

## 2024-04-28 PROCEDURE — 87086 URINE CULTURE/COLONY COUNT: CPT

## 2024-04-28 PROCEDURE — 85025 COMPLETE CBC W/AUTO DIFF WBC: CPT

## 2024-04-28 NOTE — DISCHARGE INSTRUCTIONS
Follow up with your provider to discuss symptoms.  Return to ER for any new, worsening, or change in condition.

## 2024-04-28 NOTE — ED PROVIDER NOTES
physician with the below findings:        Interpretation per the Radiologist below, if available at the time of this note:    CT HEAD WO CONTRAST   Final Result   1.  Changes of aging with no acute intracranial abnormality.   2.  Left maxillary sinusitis        All CT scans are performed using dose optimization techniques as appropriate to the performed exam and include    at least one of the following: Automated exposure control, adjustment of the mA and/or kV according to size, and the use of iterative reconstruction technique.        ______________________________________    Electronically signed by: DOUGLAS HAJI M.D.   Date:     04/28/2024   Time:    08:42             ED BEDSIDE ULTRASOUND:   Performed by ED Physician - none    LABS:  Labs Reviewed   CBC WITH AUTO DIFFERENTIAL - Abnormal; Notable for the following components:       Result Value    RBC 3.64 (*)     Hemoglobin 11.8 (*)     .7 (*)     MCH 32.4 (*)     MCHC 31.6 (*)     RDW 14.6 (*)     Neutrophils % 70.6 (*)     Lymphocytes % 18.0 (*)     All other components within normal limits   COMPREHENSIVE METABOLIC PANEL - Abnormal; Notable for the following components:    Chloride 97 (*)     BUN 49 (*)     Creatinine 7.2 (*)     Est, Glom Filt Rate 6 (*)     All other components within normal limits   URINALYSIS WITH REFLEX TO CULTURE - Abnormal; Notable for the following components:    Clarity, UA TURBID (*)     pH, UA 8.5 (*)     Leukocyte Esterase, Urine MODERATE (*)     All other components within normal limits   MICROSCOPIC URINALYSIS - Abnormal; Notable for the following components:    WBC, UA 50-75 (*)     All other components within normal limits   CULTURE, URINE   POCT GLUCOSE       All other labs were within normal range or not returned as of this dictation.    Medications - No data to display    EMERGENCY DEPARTMENT COURSE and DIFFERENTIALDIAGNOSIS/MDM:   Vitals:    Vitals:    04/28/24 0810 04/28/24 0900   BP: (!) 127/55 (!) 108/49

## 2024-04-28 NOTE — DISCHARGE INSTR - COC
Continuity of Care Form    Patient Name: Sara Pardo   :  1960  MRN:  [id number]    Admit date:  2024  Discharge date:  ***    Code Status Order: Prior   Advance Directives:     Admitting Physician:  No admitting provider for patient encounter.  PCP: Deepali Gilliland MD    Discharging Nurse: ***  Discharging Hospital Unit/Room#:   Discharging Unit Phone Number: ***    Emergency Contact:   Extended Emergency Contact Information  Primary Emergency Contact: Sabino Pardo  Address: [address] [place] [address] of [facility]  Home Phone: [phone]  Mobile Phone: [phone]  Relation: Spouse    Past Surgical History:  Past Surgical History:   Procedure Laterality Date    CHOLECYSTECTOMY, LAPAROSCOPIC      DIALYSIS CATHETER INSERTION N/A 2021    INSERTION CATHETER DIALYSIS performed by Dick Glaser MD at [facility] OR    DIALYSIS FISTULA CREATION Left 2021    LEFT BRACHIAL-CEPHALIC AV FISTULA CREATION performed by Dick Glaser MD at [facility] OR    DIALYSIS FISTULA CREATION Left 2021    LEFT UPPER EXTREMITY CEPHALIC VEIN SUPERFICIALIZATION performed by Dick Glaser MD at [facility] OR    DILATION AND CURETTAGE OF UTERUS  2005    FISTULAGRAM Left 2022    LEFT UPPER EXTREMITY AV FISTULAGRAM, POSSIBLE BALLOON ANGIOPLASTY, STENT POSS REVISION performed by Dick Glaser MD at [facility] OR    HYSTERECTOMY (CERVIX STATUS UNKNOWN)  10/17/2018    POLYPECTOMY      ~ had colon polyps,  had a benign polyp and 2 possibly cancerous polyps, DUE FOR REPEAT IN DEC 2022    RECTAL SURGERY  1981    fistula repair    TONSILLECTOMY AND ADENOIDECTOMY      at age 16 years    VASCULAR SURGERY  2021    [facility]- Ultrasound-guided cannulation left distal upper arm cephalic vein with 4 Irish glide sheath, Left upper extremity fistulogram's including venography the superior vena cava    VASCULAR SURGERY  2021    [facility]- Superficialization of the left upper arm

## 2024-04-30 LAB — BACTERIA UR CULT: NORMAL

## 2024-05-08 LAB
25(OH)D2 SERPL-MCNC: 43 NG/ML
25(OH)D3 SERPL-MCNC: 9.8 NG/ML
25(OH)D3+25(OH)D2 SERPL-MCNC: 53 NG/ML

## 2024-05-10 LAB — POTASSIUM SERPL-SCNC: 5.9 MMOL/L (ref 3.5–5)

## 2024-05-28 DIAGNOSIS — E11.69 MIXED DIABETIC HYPERLIPIDEMIA ASSOCIATED WITH TYPE 2 DIABETES MELLITUS (HCC): ICD-10-CM

## 2024-05-28 DIAGNOSIS — E78.2 MIXED DIABETIC HYPERLIPIDEMIA ASSOCIATED WITH TYPE 2 DIABETES MELLITUS (HCC): ICD-10-CM

## 2024-05-28 RX ORDER — ROSUVASTATIN CALCIUM 20 MG/1
20 TABLET, COATED ORAL DAILY
Qty: 90 TABLET | Refills: 1 | Status: SHIPPED | OUTPATIENT
Start: 2024-05-28 | End: 2024-11-24

## 2024-05-28 NOTE — TELEPHONE ENCOUNTER
Sara Pardo called to request a refill on her medication.      Last office visit : 4/16/2024   Next office visit : 10/14/2024     Requested Prescriptions     Pending Prescriptions Disp Refills    rosuvastatin (CRESTOR) 20 MG tablet [Pharmacy Med Name: ROSUVASTATIN CALCIUM 20 MG TAB] 90 tablet 0     Sig: Take 1 tablet by mouth daily            Brandy Garcai LPN

## 2024-05-30 ENCOUNTER — PREP FOR SURGERY (OUTPATIENT)
Dept: OTHER | Facility: HOSPITAL | Age: 64
End: 2024-05-30
Payer: MEDICARE

## 2024-05-30 ENCOUNTER — TELEPHONE (OUTPATIENT)
Dept: GASTROENTEROLOGY | Facility: CLINIC | Age: 64
End: 2024-05-30
Payer: MEDICARE

## 2024-05-30 DIAGNOSIS — K31.7 GASTRIC POLYPS: Primary | ICD-10-CM

## 2024-05-30 NOTE — TELEPHONE ENCOUNTER
Patient called to schedule endoscopy.  Ok to schedule without an office visit per Dr. Linder if not on blood thinners and no change in past medical history.  Meds in chart are current, no blood thinners, no change in pmh, she is on ozempic knows to hold 7 days prior.    Per Dr. Linder she will need to be on clear liquids the day prior.     Scheduled 6/13/24 @ 8:30.  Case request sent to Dr. Linder.

## 2024-06-03 DIAGNOSIS — F33.1 MAJOR DEPRESSIVE DISORDER, RECURRENT, MODERATE (HCC): ICD-10-CM

## 2024-06-03 RX ORDER — CITALOPRAM 40 MG/1
40 TABLET ORAL DAILY
Qty: 90 TABLET | Refills: 1 | Status: SHIPPED | OUTPATIENT
Start: 2024-06-03

## 2024-06-03 NOTE — TELEPHONE ENCOUNTER
Sara Pardo called to request a refill on her medication.      Last office visit : 4/16/2024   Next office visit : 10/14/2024     Requested Prescriptions     Pending Prescriptions Disp Refills    citalopram (CELEXA) 40 MG tablet [Pharmacy Med Name: CITALOPRAM HBR 40 MG TABLET] 90 tablet 11     Sig: TAKE ONE TABLET BY MOUTH EVERY DAY            Brandy Garcia LPN

## 2024-06-05 ENCOUNTER — HOSPITAL ENCOUNTER (EMERGENCY)
Age: 64
Discharge: HOME | End: 2024-06-05
Payer: MEDICARE

## 2024-06-05 ENCOUNTER — APPOINTMENT (OUTPATIENT)
Dept: GENERAL RADIOLOGY | Age: 64
End: 2024-06-05
Payer: MEDICARE

## 2024-06-05 VITALS
RESPIRATION RATE: 18 BRPM | HEART RATE: 76 BPM | DIASTOLIC BLOOD PRESSURE: 46 MMHG | WEIGHT: 293 LBS | TEMPERATURE: 98.5 F | SYSTOLIC BLOOD PRESSURE: 138 MMHG | HEIGHT: 65 IN | OXYGEN SATURATION: 98 % | BODY MASS INDEX: 48.82 KG/M2
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
## 2024-06-05 DIAGNOSIS — M25.512 ACUTE PAIN OF LEFT SHOULDER: ICD-10-CM

## 2024-06-05 PROCEDURE — 73560 X-RAY EXAM OF KNEE 1 OR 2: CPT

## 2024-06-05 PROCEDURE — 73030 X-RAY EXAM OF SHOULDER: CPT

## 2024-06-05 PROCEDURE — 99283 EMERGENCY DEPT VISIT LOW MDM: CPT

## 2024-06-05 PROCEDURE — 6370000000 HC RX 637 (ALT 250 FOR IP)

## 2024-06-05 PROCEDURE — 73130 X-RAY EXAM OF HAND: CPT

## 2024-06-05 RX ORDER — HYDROCODONE BITARTRATE AND ACETAMINOPHEN 5; 325 MG/1; MG/1
1 TABLET ORAL ONCE
Status: COMPLETED | OUTPATIENT
Start: 2024-06-05 | End: 2024-06-05

## 2024-06-05 RX ADMIN — HYDROCODONE BITARTRATE AND ACETAMINOPHEN 1 TABLET: 5; 325 TABLET ORAL at 12:34

## 2024-06-05 ASSESSMENT — PAIN DESCRIPTION - ORIENTATION: ORIENTATION: LEFT

## 2024-06-05 ASSESSMENT — PAIN SCALES - GENERAL
PAINLEVEL_OUTOF10: 8
PAINLEVEL_OUTOF10: 8

## 2024-06-05 ASSESSMENT — PAIN - FUNCTIONAL ASSESSMENT: PAIN_FUNCTIONAL_ASSESSMENT: 0-10

## 2024-06-05 ASSESSMENT — PAIN DESCRIPTION - LOCATION: LOCATION: SHOULDER

## 2024-06-05 ASSESSMENT — LIFESTYLE VARIABLES: HOW OFTEN DO YOU HAVE A DRINK CONTAINING ALCOHOL: NEVER

## 2024-06-05 NOTE — ED PROVIDER NOTES
is tolerable.     CONSULTS:  None    PROCEDURES:  Unless otherwise noted below, none     Procedures    FINAL IMPRESSION      1. Fall, initial encounter    2. Acute pain of left shoulder          DISPOSITION/PLAN   DISPOSITION Decision To Discharge 06/05/2024 01:17:37 PM      PATIENT REFERRED TO:  Rachid Garcia MD  [address] [place] [id number] [phone]    Schedule an appointment as soon as possible for a visit in 1 week      Deepali Gilliland MD  [address] [place] [address] [id number] [phone]    Schedule an appointment as soon as possible for a visit in 1 week        DISCHARGE MEDICATIONS:  Current Discharge Medication List             (Please note that portions of this note were completed with a voice recognition program.  Efforts were made to edit thedictations but occasionally words are mis-transcribed.)    JAMES Gan CNP (electronically signed)  Attending Emergency Physician        Nubia Gómez APRN - CNP  06/05/24 6447

## 2024-06-13 ENCOUNTER — HOSPITAL ENCOUNTER (OUTPATIENT)
Facility: HOSPITAL | Age: 64
Setting detail: HOSPITAL OUTPATIENT SURGERY
Discharge: HOME | End: 2024-06-13
Attending: INTERNAL MEDICINE | Admitting: INTERNAL MEDICINE
Payer: MEDICARE

## 2024-06-13 ENCOUNTER — ANESTHESIA (OUTPATIENT)
Dept: GASTROENTEROLOGY | Facility: HOSPITAL | Age: 64
End: 2024-06-13
Payer: MEDICARE

## 2024-06-13 ENCOUNTER — ANESTHESIA EVENT (OUTPATIENT)
Dept: GASTROENTEROLOGY | Facility: HOSPITAL | Age: 64
End: 2024-06-13
Payer: MEDICARE

## 2024-06-13 VITALS
SYSTOLIC BLOOD PRESSURE: 100 MMHG | HEIGHT: 66 IN | OXYGEN SATURATION: 95 % | WEIGHT: 293 LBS | TEMPERATURE: 96.5 F | HEART RATE: 71 BPM | DIASTOLIC BLOOD PRESSURE: 46 MMHG | BODY MASS INDEX: 47.09 KG/M2 | RESPIRATION RATE: 18 BRPM

## 2024-06-13 DIAGNOSIS — K31.7 GASTRIC POLYPS: ICD-10-CM

## 2024-06-13 LAB
GLUCOSE BLDC GLUCOMTR-MCNC: 100 MG/DL (ref 70–130)
GLUCOSE BLDC GLUCOMTR-MCNC: 59 MG/DL (ref 70–130)
GLUCOSE BLDC GLUCOMTR-MCNC: 81 MG/DL (ref 70–130)

## 2024-06-13 PROCEDURE — 43251 EGD REMOVE LESION SNARE: CPT | Performed by: INTERNAL MEDICINE

## 2024-06-13 PROCEDURE — 82948 REAGENT STRIP/BLOOD GLUCOSE: CPT

## 2024-06-13 PROCEDURE — 88305 TISSUE EXAM BY PATHOLOGIST: CPT | Performed by: INTERNAL MEDICINE

## 2024-06-13 PROCEDURE — 25810000003 SODIUM CHLORIDE 0.9 % SOLUTION: Performed by: ANESTHESIOLOGY

## 2024-06-13 PROCEDURE — 25010000002 PROPOFOL 10 MG/ML EMULSION: Performed by: NURSE ANESTHETIST, CERTIFIED REGISTERED

## 2024-06-13 RX ORDER — SODIUM CHLORIDE 9 MG/ML
100 INJECTION, SOLUTION INTRAVENOUS CONTINUOUS
Status: DISCONTINUED | OUTPATIENT
Start: 2024-06-13 | End: 2024-06-13 | Stop reason: HOSPADM

## 2024-06-13 RX ORDER — LIDOCAINE HYDROCHLORIDE 20 MG/ML
INJECTION, SOLUTION EPIDURAL; INFILTRATION; INTRACAUDAL; PERINEURAL AS NEEDED
Status: DISCONTINUED | OUTPATIENT
Start: 2024-06-13 | End: 2024-06-13 | Stop reason: SURG

## 2024-06-13 RX ORDER — SODIUM CHLORIDE 0.9 % (FLUSH) 0.9 %
10 SYRINGE (ML) INJECTION AS NEEDED
Status: DISCONTINUED | OUTPATIENT
Start: 2024-06-13 | End: 2024-06-13 | Stop reason: HOSPADM

## 2024-06-13 RX ORDER — ONDANSETRON 2 MG/ML
4 INJECTION INTRAMUSCULAR; INTRAVENOUS ONCE AS NEEDED
Status: DISCONTINUED | OUTPATIENT
Start: 2024-06-13 | End: 2024-06-13 | Stop reason: HOSPADM

## 2024-06-13 RX ORDER — SODIUM CHLORIDE 9 MG/ML
500 INJECTION, SOLUTION INTRAVENOUS CONTINUOUS PRN
Status: DISCONTINUED | OUTPATIENT
Start: 2024-06-13 | End: 2024-06-13 | Stop reason: HOSPADM

## 2024-06-13 RX ORDER — PROPOFOL 10 MG/ML
VIAL (ML) INTRAVENOUS AS NEEDED
Status: DISCONTINUED | OUTPATIENT
Start: 2024-06-13 | End: 2024-06-13 | Stop reason: SURG

## 2024-06-13 RX ORDER — DEXTROSE MONOHYDRATE 25 G/50ML
25 INJECTION, SOLUTION INTRAVENOUS AS NEEDED
Status: DISCONTINUED | OUTPATIENT
Start: 2024-06-13 | End: 2024-06-13 | Stop reason: HOSPADM

## 2024-06-13 RX ORDER — SODIUM CHLORIDE 0.9 % (FLUSH) 0.9 %
10 SYRINGE (ML) INJECTION EVERY 12 HOURS SCHEDULED
Status: DISCONTINUED | OUTPATIENT
Start: 2024-06-13 | End: 2024-06-13 | Stop reason: HOSPADM

## 2024-06-13 RX ORDER — SODIUM CHLORIDE 9 MG/ML
40 INJECTION, SOLUTION INTRAVENOUS AS NEEDED
Status: DISCONTINUED | OUTPATIENT
Start: 2024-06-13 | End: 2024-06-13 | Stop reason: HOSPADM

## 2024-06-13 RX ADMIN — PROPOFOL 100 MG: 10 INJECTION, EMULSION INTRAVENOUS at 09:41

## 2024-06-13 RX ADMIN — PROPOFOL 50 MG: 10 INJECTION, EMULSION INTRAVENOUS at 09:44

## 2024-06-13 RX ADMIN — SODIUM CHLORIDE 500 ML: 9 INJECTION, SOLUTION INTRAVENOUS at 09:09

## 2024-06-13 RX ADMIN — PROPOFOL 50 MG: 10 INJECTION, EMULSION INTRAVENOUS at 09:48

## 2024-06-13 RX ADMIN — DEXTROSE MONOHYDRATE 25 ML: 25 INJECTION, SOLUTION INTRAVENOUS at 09:15

## 2024-06-13 RX ADMIN — LIDOCAINE HYDROCHLORIDE 100 MG: 20 INJECTION, SOLUTION EPIDURAL; INFILTRATION; INTRACAUDAL; PERINEURAL at 09:41

## 2024-06-13 NOTE — ANESTHESIA PREPROCEDURE EVALUATION
Anesthesia Evaluation     Patient summary reviewed   no history of anesthetic complications:   NPO Solid Status: > 8 hours  NPO Liquid Status: > 8 hours           Airway   Mallampati: II  TM distance: >3 FB  Neck ROM: full  Dental      Pulmonary    (+) ,sleep apnea on CPAP  Cardiovascular   Exercise tolerance: poor (<4 METS)    (+) hypertension, DVT resolved, hyperlipidemia  (-) past MI, CHF, cardiac stents      Neuro/Psych- negative ROS  (-) TIA, CVA    ROS Comment: Trigeminal neuralgia on carbamazepine  GI/Hepatic/Renal/Endo    (+) morbid obesity, renal disease (home dialysis yesterday)- ESRD and dialysis, diabetes mellitus (insulin pump) using insulin, thyroid problem hypothyroidism    Musculoskeletal     Abdominal   (+) obese   Substance History      OB/GYN          Other   arthritis, blood dyscrasia anemia,                   Anesthesia Plan    ASA 4     MAC     (CGM reading 55, insulin pump off, will start iv and give 1/2 amp d 50)  intravenous induction     Anesthetic plan, risks, benefits, and alternatives have been provided, discussed and informed consent has been obtained with: patient.      CODE STATUS:

## 2024-06-13 NOTE — H&P
[facility] Gastroenterology  Pre Procedure History & Physical    Chief Complaint:   History of adenomatous gastric polyps    Subjective     HPI:   She has a history of adenomatous gastric polyps.  She also has fundic type polyps.  Her last endoscopy exam was a year ago noting no problems.  The 2 prior to that she had several adenomatous polyps.  She is asymptomatic now.  She presents for endoscopy    Past Medical History:   Past Medical History:   Diagnosis Date    Acquired hypothyroidism 02/06/2017    Allergic rhinitis     Anemia due to stage 4 chronic kidney disease 08/18/2020    Anxiety     Arthritis     Carpal tunnel syndrome 07/2023    Cellulitis     CHF (congestive heart failure)     Chronic kidney disease     3rd stage    Depression     Dialysis patient     AV fistula in Left arm    Dyslipidemia     Elevated cholesterol     Essential hypertension     Fatigue     Gallbladder abscess     Hearing loss     History of transfusion     Insulin pump in place     Omnipod    Light headed     Limited range of motion (ROM) of shoulder     right    Obesity     Obstructive sleep apnea treated with continuous positive airway pressure (CPAP)     Palpitation     Short of breath on exertion     Sinusitis     Stage 4 chronic kidney disease 09/16/2020    Type 2 diabetes mellitus     Type II diabetes mellitus, uncontrolled     Wears glasses        Past Surgical History:  Past Surgical History:   Procedure Laterality Date    ADENOIDECTOMY      ANAL FISTULA REPAIR Left     x 2     ARTERIOVENOUS FISTULA Left 08/2021    CARPAL TUNNEL RELEASE Left 05/24/2023    Procedure: LEFT OPEN CARPAL TUNNEL RELEASE;  Surgeon: Juno Ledbetter MD;  Location: [facility] OR;  Service: Orthopedics;  Laterality: Left;    CARPAL TUNNEL RELEASE Right 8/2/2023    Procedure: RIGHT OPEN CARPAL TUNNEL RELEASE;  Surgeon: Juno Ledbetter MD;  Location: [facility] OR;  Service: Orthopedics;  Laterality: Right;    CHOLECYSTECTOMY      COLONOSCOPY  01/02/2014     COLONOSCOPY N/A 03/22/2017    Procedure: COLONOSCOPY WITH ANESTHESIA;  Surgeon: Mono Linder MD;  Location: [facility] ENDOSCOPY;  Service:     COLONOSCOPY N/A 05/09/2022    Procedure: COLONOSCOPY WITH ANESTHESIA;  Surgeon: Mono Linder MD;  Location: [facility] ENDOSCOPY;  Service: Gastroenterology;  Laterality: N/A;  pre polyp;brbpr  post  Dr. Soliman    D & C HYSTEROSCOPY N/A 07/25/2018    Procedure: DILATATION AND CURETTAGE HYSTEROSCOPY;  Surgeon: Michael Castaneda MD;  Location: [facility] OR;  Service: Obstetrics/Gynecology    DILATATION AND CURETTAGE      X 2    ENDOSCOPY      ENDOSCOPY N/A 05/09/2022    Procedure: ESOPHAGOGASTRODUODENOSCOPY WITH ANESTHESIA;  Surgeon: Mono Linder MD;  Location: [facility] ENDOSCOPY;  Service: Gastroenterology;  Laterality: N/A;  pre screen  post gastric polyps  Dr. Soliman    ENDOSCOPY N/A 11/28/2022    Procedure: ESOPHAGOGASTRODUODENOSCOPY WITH ANESTHESIA;  Surgeon: Mono Linder MD;  Location: [facility] ENDOSCOPY;  Service: Gastroenterology;  Laterality: N/A;  pre: hx gastric polyp  post: Retained food, gastritis  dr sebastián soliman    ENDOSCOPY N/A 12/29/2022    Procedure: ESOPHAGOGASTRODUODENOSCOPY;  Surgeon: Mono Linder MD;  Location: [facility] ENDOSCOPY;  Service: Gastroenterology;  Laterality: N/A;  preop; hx of polyps  postop gastric polyps  Bette Coffman MD    ENDOSCOPY N/A 06/07/2023    Procedure: ESOPHAGOGASTRODUODENOSCOPY;  Surgeon: Mono Linder MD;  Location: [facility] ENDOSCOPY;  Service: Gastroenterology;  Laterality: N/A;  pre hx gastric polyp  post normal  dr bette brewer    HYSTERECTOMY      TONSILLECTOMY          Family History:  Family History   Problem Relation Age of Onset    Diabetes Other     Heart failure Other     Cancer Other     Kidney disease Other     Lung cancer Mother     Heart disease Father     Diabetes Father     Obesity Father     Stroke Father     Prostate cancer Father     Cancer Maternal Grandmother     Uterine  cancer Maternal Grandmother     Diabetes Maternal Grandfather     Cancer Paternal Grandmother     Colon cancer Paternal Grandmother     Diabetes Paternal Grandfather     Heart disease Paternal Grandfather     No Known Problems Sister     No Known Problems Brother     No Known Problems Daughter     No Known Problems Maternal Aunt     No Known Problems Paternal Aunt     BRCA 1/2 Neg Hx     Breast cancer Neg Hx     Endometrial cancer Neg Hx     Ovarian cancer Neg Hx        Social History:   reports that she has never smoked. She has never used smokeless tobacco. She reports that she does not drink alcohol and does not use drugs.    Medications:   Prior to Admission medications    Medication Sig Start Date End Date Taking? Authorizing Provider   allopurinol (ZYLOPRIM) 100 MG tablet Take 1 tablet by mouth Daily. 4/27/23  Yes Wilman Negrete MD   buPROPion SR (WELLBUTRIN SR) 100 MG 12 hr tablet  2/5/24  Yes Rk Hidalgo MD   calcitriol (ROCALTROL) 0.5 MCG capsule Take 1 capsule by mouth Daily.   Yes Rk Hidalgo MD   carvedilol (COREG) 3.125 MG tablet Take 1 tablet by mouth 2 (Two) Times a Day. 8/21/23  Yes Rk Hidalgo MD   cetirizine (zyrTEC) 10 MG tablet Take 1 tablet by mouth. 5/10/22  Yes Rk Hidalgo MD   citalopram (CeleXA) 40 MG tablet Take 1 tablet by mouth Daily. 2/27/23  Yes Janet Richardson APRN   Continuous Blood Gluc Sensor (FreeStyle Filippo 2 Sensor) misc 1 each Every 14 (Fourteen) Days. Left arm 7/13/23  Yes Wilman Negrete MD   famotidine (PEPCID) 20 MG tablet Take 2 tablets by mouth 2 (Two) Times a Day. 7/24/23  Yes Mono Linder MD   Heparin Sod, Pork, Lock Flush (Heparin Na, Pork, Lock Flsh PF) 1 UNIT/ML solution lock flush 6,000 mL by Intracatheter route Every 8 (Eight) Hours. Administers before every dialysis treatment, which is 4-5 times a week at home   Yes Rk Hidalgo MD   HUMULIN R 500 UNIT/ML CONCENTRATED injection  INJECT UNDER THE SKIN INTO THE APPROPRIATE AREA 3 TIMES A DAY BEFORE MEALS 9/11/23  Yes Wilman Negrete MD   Insulin Disposable Pump (Omnipod 5 G6 Pod, Gen 5,) misc 1 each Every Other Day. 10/12/22  Yes Wilman Negrete MD   lamoTRIgine (LaMICtal) 100 MG tablet 1/2 QD   Yes Rk Hidalgo MD   levothyroxine (Synthroid) 112 MCG tablet Take 1 tablet by mouth Daily. 4/27/23 6/13/24 Yes Wilman Negrete MD   lisinopril (PRINIVIL,ZESTRIL) 20 MG tablet 2 (Two) Times a Day.   Yes Rk Hidalgo MD   losartan (COZAAR) 50 MG tablet Take 1 tablet by mouth Daily. 11/22/23  Yes Rk Hidalgo MD   Misc. Devices (Bariatric Rollator) misc Please provide device for patient 11/11/22  Yes Gwen Arenas APRN   rosuvastatin (CRESTOR) 20 MG tablet Take 1 tablet by mouth Every Night. 5/2/23  Yes Janet Richardson APRN   sevelamer (RENVELA) 800 MG tablet Take 4 tablets with each meal and 2 tablets with a snack 10/21/22  Yes Rk Hidalgo MD   aspirin 81 MG EC tablet Take 1 tablet by mouth Daily.    ProviderRk MD   diazePAM (VALIUM) 2 MG tablet Take 1 tablet by mouth 3 (Three) Times a Day.    Rk Hidalgo MD   fluticasone (FLONASE) 50 MCG/ACT nasal spray 1 spray into the nostril(s) as directed by provider 2 (Two) Times a Day. 3/22/21   Rk Hidalgo MD   potassium chloride (KLOR-CON M20) 20 MEQ CR tablet  4/11/24   Rk Hidalgo MD   Repatha solution prefilled syringe injection Inject 1 mL under theskin into the appropriate area as directed Every 14 (Fourteen) Days. 12/22/22   Vikas Brown APRN   Semaglutide, 2 MG/DOSE, (Ozempic, 2 MG/DOSE,) 8 MG/3ML solution pen-injector Inject 2 mg under the skin into the appropriate area as directed 1 (One) Time Per Week. Please cancel Trulicity 1/6/23   Wilman Negrete MD   traZODone (DESYREL) 100 MG tablet Take 1 tablet by mouth Every Night. 10/9/23 4/7/24  Provider, Historical,  "MD       Allergies:  Codeine    ROS:    General: Weight stable  Respiratory: No SOA  Cardiovascular: No CP    Objective     Blood pressure 128/62, pulse 73, temperature 96.5 °F (35.8 °C), temperature source Temporal, resp. rate 20, height 166.4 cm (65.5\"), weight 134 kg (296 lb), SpO2 97%, not currently breastfeeding.    Physical Exam   Constitutional: Pt is oriented to person, place, and in no distress.   Cardiovascular: Normal rate, regular rhythm.    Pulmonary/Chest: Effort normal. No respiratory distress.    Abdominal: Nondistended.  Psychiatric: Mood, memory, affect and judgment appear normal.     Assessment & Plan     Diagnosis:  History of adenomatous gastric polyps    Anticipated Surgical Procedure:  Endoscopy    The risks, benefits, and alternatives of this procedure have been discussed with the patient or the responsible party- the patient understands and agrees to proceed.    EMR Dragon/transcription disclaimer:  Much of this encounter note is electronic transcription/translation of spoken language to printed text.  The electronic translation of spoken language may be erroneous, or at times, nonsensical words or phrases may be inadvertently transcribed.  Although I have reviewed the note for such errors, some may still exist.  "

## 2024-06-13 NOTE — ANESTHESIA POSTPROCEDURE EVALUATION
Patient: Maria C Shrestha    Procedure Summary       Date: 06/13/24 Room / Location: [facility] ENDOSCOPY 4 / [facility] ENDOSCOPY    Anesthesia Start: 0938 Anesthesia Stop: 0953    Procedure: ESOPHAGOGASTRODUODENOSCOPY WITH ANESTHESIA Diagnosis:       Gastric polyps      (Gastric polyps [K31.7])    Surgeons: Mono Linder MD Provider: Hussain Mallory CRNA    Anesthesia Type: MAC ASA Status: 4            Anesthesia Type: MAC    Vitals  Vitals Value Taken Time   /48 06/13/24 1016   Temp     Pulse 71 06/13/24 1017   Resp 18 06/13/24 1005   SpO2 97 % 06/13/24 1016   Vitals shown include unfiled device data.        Post Anesthesia Care and Evaluation    Patient location during evaluation: PHASE II  Patient participation: complete - patient participated  Level of consciousness: awake and sleepy but conscious  Pain score: 0  Pain management: adequate    Airway patency: patent  Anesthetic complications: No anesthetic complications    Cardiovascular status: acceptable  Respiratory status: acceptable  Hydration status: acceptable

## 2024-06-14 LAB
CYTO UR: NORMAL
LAB AP CASE REPORT: NORMAL
Lab: NORMAL
PATH REPORT.FINAL DX SPEC: NORMAL
PATH REPORT.GROSS SPEC: NORMAL

## 2024-06-24 NOTE — PROGRESS NOTES
[facility] - PODIATRY    Today's Date: 06/27/2024     Patient Name: Maria C Shrestha  MRN: [id number]  CSN: [id number]  PCP: Fatmata Wallace MD  Referring Provider: No ref. provider found    SUBJECTIVE     Chief Complaint   Patient presents with    Follow-up     Fatmata Wallace 04/16/2024 3 MTH FU DIABETIC - pt states  feet doing ok, they are dry- pt denies pain     Diabetes     HPI: Maria C Shrestha, a 64 y.o.female, comes to clinic as a(n) established patient presenting for diabetic foot exam and complaining of toenail/callus issues. Patient has h/o hypothyroidism, anemia, anxiety, arthritis, cellulitis, CKD, depression, thyroid disease, DM, HLD, HTN, fatigue, gallbladder abscess, obesity, . Patient is IDDM and unsure of last BG level. States that glucose is typically well controlled. Today she Zentz with complaints that her toenails are long, thickened, and irregular.  Continues to note difficulty reaching her nails to care for them herself.  Is currently using a wheelchair for ambulation support today.  Has CKD and does her dialysis treatments at home.  However, patient states that they have been moving and she has missed the past 4 days of her dialysis treatments.  She does continue to note sensations in the feet that feels like she is walking on something/has something attached to feet.  Denies pain or open wounds/sores today.  Notes dryness to both of her feet.  Relates previous treatment(s) including care by podiatry . Denies any constitutional symptoms. No other pedal complaints at this time.    Past Medical History:   Diagnosis Date    Acquired hypothyroidism 02/06/2017    Allergic rhinitis     Anemia due to stage 4 chronic kidney disease 08/18/2020    Anxiety     Arthritis     Carpal tunnel syndrome 07/2023    Cellulitis     CHF (congestive heart failure)     Chronic kidney disease     3rd stage    Depression     Dialysis patient     AV fistula in Left arm    Dyslipidemia      Elevated cholesterol     Essential hypertension     Fatigue     Gallbladder abscess     Hearing loss     History of transfusion     Insulin pump in place     Omnipod    Light headed     Limited range of motion (ROM) of shoulder     right    Obesity     Obstructive sleep apnea treated with continuous positive airway pressure (CPAP)     Palpitation     Short of breath on exertion     Sinusitis     Stage 4 chronic kidney disease 09/16/2020    Type 2 diabetes mellitus     Type II diabetes mellitus, uncontrolled     Wears glasses      Past Surgical History:   Procedure Laterality Date    ADENOIDECTOMY      ANAL FISTULA REPAIR Left     x 2     ARTERIOVENOUS FISTULA Left 08/2021    CARPAL TUNNEL RELEASE Left 05/24/2023    Procedure: LEFT OPEN CARPAL TUNNEL RELEASE;  Surgeon: Juno Ledbetter MD;  Location: [facility] OR;  Service: Orthopedics;  Laterality: Left;    CARPAL TUNNEL RELEASE Right 8/2/2023    Procedure: RIGHT OPEN CARPAL TUNNEL RELEASE;  Surgeon: Juno Ledbetter MD;  Location: [facility] OR;  Service: Orthopedics;  Laterality: Right;    CHOLECYSTECTOMY      COLONOSCOPY  01/02/2014    COLONOSCOPY N/A 03/22/2017    Procedure: COLONOSCOPY WITH ANESTHESIA;  Surgeon: Mono Linder MD;  Location: [facility] ENDOSCOPY;  Service:     COLONOSCOPY N/A 05/09/2022    Procedure: COLONOSCOPY WITH ANESTHESIA;  Surgeon: Mono Linder MD;  Location: [facility] ENDOSCOPY;  Service: Gastroenterology;  Laterality: N/A;  pre polyp;brbpr  post  Dr. Soliman    D & C HYSTEROSCOPY N/A 07/25/2018    Procedure: DILATATION AND CURETTAGE HYSTEROSCOPY;  Surgeon: Michael Castaneda MD;  Location: [facility] OR;  Service: Obstetrics/Gynecology    DILATATION AND CURETTAGE      X 2    ENDOSCOPY      ENDOSCOPY N/A 05/09/2022    Procedure: ESOPHAGOGASTRODUODENOSCOPY WITH ANESTHESIA;  Surgeon: Mono Linder MD;  Location: [facility] ENDOSCOPY;  Service: Gastroenterology;  Laterality: N/A;  pre screen  post gastric polyps  Dr. Soliman    ENDOSCOPY N/A 11/28/2022     Procedure: ESOPHAGOGASTRODUODENOSCOPY WITH ANESTHESIA;  Surgeon: Mono Linder MD;  Location: [facility] ENDOSCOPY;  Service: Gastroenterology;  Laterality: N/A;  pre: hx gastric polyp  post: Retained food, gastritis  dr sebastián nguyen    ENDOSCOPY N/A 12/29/2022    Procedure: ESOPHAGOGASTRODUODENOSCOPY;  Surgeon: Mono Linder MD;  Location: [facility] ENDOSCOPY;  Service: Gastroenterology;  Laterality: N/A;  preop; hx of polyps  postop gastric polyps  PCP Bette Wallace MD    ENDOSCOPY N/A 06/07/2023    Procedure: ESOPHAGOGASTRODUODENOSCOPY;  Surgeon: Mono Linder MD;  Location: [facility] ENDOSCOPY;  Service: Gastroenterology;  Laterality: N/A;  pre hx gastric polyp  post normal  dr bette brewer    ENDOSCOPY N/A 6/13/2024    Procedure: ESOPHAGOGASTRODUODENOSCOPY WITH ANESTHESIA;  Surgeon: Mono Linder MD;  Location: [facility] ENDOSCOPY;  Service: Gastroenterology;  Laterality: N/A;  pre: gastric polyps  post: gastric polyp.   roseline-osman    HYSTERECTOMY      TONSILLECTOMY       Family History   Problem Relation Age of Onset    Diabetes Other     Heart failure Other     Cancer Other     Kidney disease Other     Lung cancer Mother     Heart disease Father     Diabetes Father     Obesity Father     Stroke Father     Prostate cancer Father     Cancer Maternal Grandmother     Uterine cancer Maternal Grandmother     Diabetes Maternal Grandfather     Cancer Paternal Grandmother     Colon cancer Paternal Grandmother     Diabetes Paternal Grandfather     Heart disease Paternal Grandfather     No Known Problems Sister     No Known Problems Brother     No Known Problems Daughter     No Known Problems Maternal Aunt     No Known Problems Paternal Aunt     BRCA 1/2 Neg Hx     Breast cancer Neg Hx     Endometrial cancer Neg Hx     Ovarian cancer Neg Hx      Social History     Socioeconomic History    Marital status:    Tobacco Use    Smoking status: Never    Smokeless tobacco: Never   Vaping Use     Vaping status: Never Used   Substance and Sexual Activity    Alcohol use: No    Drug use: No    Sexual activity: Defer     Birth control/protection: Post-menopausal, Hysterectomy     Allergies   Allergen Reactions    Codeine Nausea And Vomiting and GI Intolerance     Current Outpatient Medications   Medication Sig Dispense Refill    allopurinol (ZYLOPRIM) 100 MG tablet Take 1 tablet by mouth Daily. 30 tablet 11    aspirin 81 MG EC tablet Take 1 tablet by mouth Daily.      buPROPion SR (WELLBUTRIN SR) 100 MG 12 hr tablet       calcitriol (ROCALTROL) 0.5 MCG capsule Take 1 capsule by mouth Daily.      carvedilol (COREG) 3.125 MG tablet Take 1 tablet by mouth 2 (Two) Times a Day.      cetirizine (zyrTEC) 10 MG tablet Take 1 tablet by mouth.      citalopram (CeleXA) 40 MG tablet Take 1 tablet by mouth Daily. 30 tablet 0    Continuous Blood Gluc Sensor (FreeStyle Filippo 2 Sensor) misc 1 each Every 14 (Fourteen) Days. Left arm 2 each 3    diazePAM (VALIUM) 2 MG tablet Take 1 tablet by mouth 3 (Three) Times a Day.      famotidine (PEPCID) 20 MG tablet Take 2 tablets by mouth 2 (Two) Times a Day. 120 tablet 11    fluticasone (FLONASE) 50 MCG/ACT nasal spray 1 spray into the nostril(s) as directed by provider 2 (Two) Times a Day.      Heparin Sod, Pork, Lock Flush (Heparin Na, Pork, Lock Flsh PF) 1 UNIT/ML solution lock flush 6,000 mL by Intracatheter route Every 8 (Eight) Hours. Administers before every dialysis treatment, which is 4-5 times a week at home      HUMULIN R 500 UNIT/ML CONCENTRATED injection INJECT UNDER THE SKIN INTO THE APPROPRIATE AREA 3 TIMES A DAY BEFORE MEALS 40 mL 11    Insulin Disposable Pump (Omnipod 5 G6 Pod, Gen 5,) misc 1 each Every Other Day. 15 each 6    lamoTRIgine (LaMICtal) 100 MG tablet 1/2 QD      lisinopril (PRINIVIL,ZESTRIL) 20 MG tablet 2 (Two) Times a Day.      losartan (COZAAR) 50 MG tablet Take 1 tablet by mouth Daily.      Misc. Devices (Bariatric Rollator) misc Please provide device  for patient 1 each 0    potassium chloride (KLOR-CON M20) 20 MEQ CR tablet       Repatha solution prefilled syringe injection Inject 1 mL under theskin into the appropriate area as directed Every 14 (Fourteen) Days. 2 mL 4    rosuvastatin (CRESTOR) 20 MG tablet Take 1 tablet by mouth Every Night. 30 tablet 0    Semaglutide, 2 MG/DOSE, (Ozempic, 2 MG/DOSE,) 8 MG/3ML solution pen-injector Inject 2 mg under the skin into the appropriate area as directed 1 (One) Time Per Week. Please cancel Trulicity 3 mL 3    sevelamer (RENVELA) 800 MG tablet Take 4 tablets with each meal and 2 tablets with a snack      levothyroxine (Synthroid) 112 MCG tablet Take 1 tablet by mouth Daily. 30 tablet 11    traZODone (DESYREL) 100 MG tablet Take 1 tablet by mouth Every Night.       No current facility-administered medications for this visit.     Review of Systems   Constitutional:  Negative for chills and fever.   HENT:  Negative for congestion.    Respiratory:  Negative for shortness of breath.    Cardiovascular:  Positive for leg swelling. Negative for chest pain.   Gastrointestinal:  Negative for constipation, diarrhea, nausea and vomiting.   Musculoskeletal:  Positive for arthralgias and gait problem.        Occasional foot pain; unsteady gait   Skin:  Negative for wound.   Neurological:  Positive for numbness. Negative for dizziness and weakness.   Hematological:  Does not bruise/bleed easily.   Psychiatric/Behavioral:  Negative for agitation, behavioral problems and confusion.        OBJECTIVE     Vitals:    06/27/24 1033   BP: 144/78         PHYSICAL EXAM  GEN:   Accompanied by none.     Foot/Ankle Exam    GENERAL  Diabetic foot exam performed    Appearance:  obese  Orientation:  AAOx3  Affect:  appropriate  Gait:  (Unsteady)  Assistance:  independent  Right shoe gear: casual shoe  Left shoe gear: casual shoe    VASCULAR     Right Foot Vascularity   Dorsalis pedis:  2+  Posterior tibial:  2+  Skin temperature:  warm  Edema  gradin+ and non-pitting  CFT:  3  Pedal hair growth:  Present  Varicosities:  none     Left Foot Vascularity   Dorsalis pedis:  2+  Posterior tibial:  2+  Skin temperature:  warm  Edema gradin+ and non-pitting  CFT:  3  Pedal hair growth:  Present  Varicosities:  none     NEUROLOGIC     Right Foot Neurologic   Light touch sensation: diminished  Vibratory sensation: normal  Hot/Cold sensation: normal  Protective Sensation using Galva-Laura Monofilament:   Sites intact: 8  Sites tested: 10     Left Foot Neurologic   Light touch sensation: diminished  Vibratory sensation: normal  Hot/Cold sensation:  normal  Protective Sensation using Galva-Laura Monofilament:   Sites intact: 8  Sites tested: 10    MUSCULOSKELETAL     Right Foot Musculoskeletal   Ecchymosis:  none  Tenderness:  toenail problem    Arch:  Normal  Hallux valgus: No       Left Foot Musculoskeletal   Ecchymosis:  none  Tenderness:  toenail problem  Arch:  Normal  Hallux valgus: No      MUSCLE STRENGTH     Right Foot Muscle Strength   Foot dorsiflexion:  5  Foot plantar flexion:  5  Foot inversion:  5  Foot eversion:  5     Left Foot Muscle Strength   Foot dorsiflexion:  5  Foot plantar flexion:  5  Foot inversion:  5  Foot eversion:  5    RANGE OF MOTION     Right Foot Range of Motion   Foot and ankle ROM within normal limits       Left Foot Range of Motion   Foot and ankle ROM within normal limits      DERMATOLOGIC      Right Foot Dermatologic   Skin  Right foot skin is intact. Negative for corn and tinea.   Nails  1.  Positive for onychomycosis, abnormal thickness and subungual debris.  2.  Positive for abnormal thickness and subungual debris.  3.  Positive for onychomycosis, abnormal thickness and subungual debris.  4.  Positive for onychomycosis, abnormal thickness and subungual debris.  5.  Positive for onychomycosis, abnormal thickness and subungual debris.     Left Foot Dermatologic   Skin  Left foot skin is intact. Negative for  corn and tinea.   Nails  1.  Positive for elongated, onychomycosis, abnormal thickness and subungual debris.  2.  Positive for onychomycosis, abnormal thickness and subungual debris.  3.  Positive for onychomycosis, abnormal thickness, subungual debris and dystrophic nail.  4.  Positive for onychomycosis, abnormally thick and subungual debris.  5.  Positive for onychomycosis, abnormally thick and subungual debris.      RADIOLOGY/NUCLEAR:  XR Shoulder 2+ View Right    Result Date: 6/5/2024  Narrative: EXAM: RIGHT SHOULDER RADIOGRAPH.  3 VIEWS. HISTORY: Injury Right shoulder pain. TECHNIQUE: Three views.  Frontal internal rotation, frontal external rotation, scapular Y-view. COMPARISON: None. FINDINGS: There is no fracture or dislocation.  The soft tissues are normal. Articular surfaces are intact. There is no lytic or blastic lesion. There is no radiopaque foreign body.    Impression: 1.  Negative radiographic images of the right shoulder. ______________________________________ Electronically signed by: SANDRA LIVINGSTON M.D. Date:     06/05/2024 Time:    12:30     XR Hand 3+ View Right    Result Date: 6/5/2024  Narrative: EXAM:  RIGHT HAND THREE VIEW HISTORY: Injury, fall. COMPARISON:  None. FINDINGS: see impression.    Impression: No discrete fracture line. No dislocation. Mild degenerative changes. If symptoms persist, repeat radiographs in 7-10 days are recommended. ______________________________________ Electronically signed by: TAN CLARK D.O. Date:     06/05/2024 Time:    12:24     XR Shoulder 2+ View Left    Result Date: 6/5/2024  Narrative: EXAM: LEFT SHOULDER RADIOGRAPH. HISTORY: Left shoulder pain. TECHNIQUE: Three views.  Frontal internal rotation, frontal external rotation, and scapular Y-view. COMPARISON: None. FINDINGS: There is no fracture or dislocation. The soft tissues are normal. Articular surfaces are intact.    Impression: 1.  Unremarkable images of the left shoulder.  ______________________________________ Electronically signed by: PREETI ROGERS D.O. Date:     06/05/2024 Time:    12:24     XR Knee 1 or 2 View Left    Result Date: 6/5/2024  Narrative: EXAM:  LEFT KNEE TWO-VIEW HISTORY:  Panel weightbearing patient fell COMPARISON:  None. FINDINGS:  AP and lateral views of the left knee are obtained. There is narrowing of the medial joint compartment.  There is marked narrowing of the patellofemoral joint spurring of the patella superiorly and inferiorly.  Small enthesiophyte is are present quadriceps tendon insertion and patellar tendon origin.   There is no acute radiographic fracture.  If symptoms persist consider MRI.    Impression: Degenerative arthrosis most marked in the medial and patellofemoral joint compartments. ______________________________________ Electronically signed by: SANDRA LIVINGSTON M.D. Date:     06/05/2024 Time:    12:16      LABORATORY/CULTURE RESULTS:      PATHOLOGY RESULTS:       ASSESSMENT/PLAN     Diagnoses and all orders for this visit:    1. Onychomycosis (Primary)    2. Type 2 diabetes mellitus with diabetic polyneuropathy, without long-term current use of insulin    3. Stage 5 chronic kidney disease on chronic dialysis    4. Gait abnormality        Comprehensive lower extremity examination and evaluation was performed.  Discussed findings and treatment plan including risks, benefits, and treatment options with patient in detail. Patient agreed with treatment plan.  After verbal consent obtained, nail(s) x10 debrided of length and thickness with nail nipper without incidence  Patient may maintain nails and calluses at home utilizing emery board or pumice stone between visits as needed  Reviewed at home diabetic foot care including daily foot checks   Continue diabetic monitoring and control under direction of PCP.  Continue to elevate lower extremities while at rest.   Continue use of wheelchair for ambulation support.  Educated on significance of missing  hemodialysis treatment and possible consequences from doing so.    An After Visit Summary was printed and given to the patient at discharge, including (if requested) any available informative/educational handouts regarding diagnosis, treatment, or medications. All questions were answered to patient/family satisfaction. Should symptoms fail to improve or worsen they agree to call or return to clinic or to go to the Emergency Department. Discussed the importance of following up with any needed screening tests/labs/specialist appointments and any requested follow-up recommended by me today. Importance of maintaining follow-up discussed and patient accepts that missed appointments can delay diagnosis and potentially lead to worsening of conditions.  Return in about 3 months (around 9/27/2024) for Follow-up with APRN, Follow-up in [facility] or sooner if acute issues arise.        This document has been electronically signed by FLOR Zaldivar on June 27, 2024 13:01 CDT

## 2024-06-26 ENCOUNTER — TELEPHONE (OUTPATIENT)
Age: 64
End: 2024-06-26
Payer: MEDICARE

## 2024-06-27 ENCOUNTER — OFFICE VISIT (OUTPATIENT)
Age: 64
End: 2024-06-27
Payer: MEDICARE

## 2024-06-27 VITALS
WEIGHT: 293 LBS | DIASTOLIC BLOOD PRESSURE: 78 MMHG | SYSTOLIC BLOOD PRESSURE: 144 MMHG | BODY MASS INDEX: 47.09 KG/M2 | HEIGHT: 66 IN

## 2024-06-27 DIAGNOSIS — Z99.2 STAGE 5 CHRONIC KIDNEY DISEASE ON CHRONIC DIALYSIS: ICD-10-CM

## 2024-06-27 DIAGNOSIS — N18.6 STAGE 5 CHRONIC KIDNEY DISEASE ON CHRONIC DIALYSIS: ICD-10-CM

## 2024-06-27 DIAGNOSIS — B35.1 ONYCHOMYCOSIS: Primary | ICD-10-CM

## 2024-06-27 DIAGNOSIS — E11.42 TYPE 2 DIABETES MELLITUS WITH DIABETIC POLYNEUROPATHY, WITHOUT LONG-TERM CURRENT USE OF INSULIN: ICD-10-CM

## 2024-06-27 DIAGNOSIS — R26.9 GAIT ABNORMALITY: ICD-10-CM

## 2024-07-01 ENCOUNTER — TELEPHONE (OUTPATIENT)
Dept: PSYCHIATRY | Age: 64
End: 2024-07-01

## 2024-07-01 NOTE — TELEPHONE ENCOUNTER
Called pt on 07/01/24 for appointment reminder for 07/03/24. Pt confirmed     Reminded patient to complete their visit pre-check/digital registration in [email or web address].

## 2024-07-03 ENCOUNTER — TELEPHONE (OUTPATIENT)
Dept: PSYCHIATRY | Age: 64
End: 2024-07-03

## 2024-07-03 NOTE — TELEPHONE ENCOUNTER
Patient was a late cancellation for their appointment [facility].      Patient's appointment was rescheduled. Discussed the three late cancellation and dismissal policies with the patient. Provided education that after three late cancellations, patients can be dismissed by the provider and practice. Patient verbally understood.    Discussed the three no show and dismissal policies with the patient. Provided education that after three no show, patients can be dismissed by the provider and practice.     Pt called to cancel/rescheduled appt for today 07/03/24 with Dr. Norton because she is sick.    Rescheduled for 07/24/24 @ 11:00.    Electronically signed by Belen Carolina MA on 7/3/2024 at 3:53 PM

## 2024-07-23 ENCOUNTER — TELEPHONE (OUTPATIENT)
Dept: PSYCHIATRY | Age: 64
End: 2024-07-23

## 2024-07-23 NOTE — TELEPHONE ENCOUNTER
Called patient to remind them of their appointment   -Pt confirmed     Reminded patient to complete their visit pre-check/digital registration in [employer].

## 2024-07-24 ENCOUNTER — OFFICE VISIT (OUTPATIENT)
Dept: PSYCHIATRY | Age: 64
End: 2024-07-24
Payer: MEDICARE

## 2024-07-24 VITALS
HEIGHT: 65 IN | TEMPERATURE: 97.7 F | OXYGEN SATURATION: 95 % | WEIGHT: 275 LBS | HEART RATE: 92 BPM | BODY MASS INDEX: 45.82 KG/M2 | DIASTOLIC BLOOD PRESSURE: 83 MMHG | SYSTOLIC BLOOD PRESSURE: 126 MMHG

## 2024-07-24 DIAGNOSIS — G47.00 INSOMNIA, UNSPECIFIED TYPE: ICD-10-CM

## 2024-07-24 DIAGNOSIS — F33.0 MAJOR DEPRESSIVE DISORDER, RECURRENT, MILD (HCC): Primary | ICD-10-CM

## 2024-07-24 DIAGNOSIS — F41.1 GENERALIZED ANXIETY DISORDER: ICD-10-CM

## 2024-07-24 PROCEDURE — 3079F DIAST BP 80-89 MM HG: CPT | Performed by: PSYCHIATRY & NEUROLOGY

## 2024-07-24 PROCEDURE — G8428 CUR MEDS NOT DOCUMENT: HCPCS | Performed by: PSYCHIATRY & NEUROLOGY

## 2024-07-24 PROCEDURE — G8417 CALC BMI ABV UP PARAM F/U: HCPCS | Performed by: PSYCHIATRY & NEUROLOGY

## 2024-07-24 PROCEDURE — 3074F SYST BP LT 130 MM HG: CPT | Performed by: PSYCHIATRY & NEUROLOGY

## 2024-07-24 PROCEDURE — 99215 OFFICE O/P EST HI 40 MIN: CPT | Performed by: PSYCHIATRY & NEUROLOGY

## 2024-07-24 PROCEDURE — 1036F TOBACCO NON-USER: CPT | Performed by: PSYCHIATRY & NEUROLOGY

## 2024-07-24 PROCEDURE — 3017F COLORECTAL CA SCREEN DOC REV: CPT | Performed by: PSYCHIATRY & NEUROLOGY

## 2024-07-24 NOTE — PROGRESS NOTES
7/24/2024 12:26 PM   Progress Note          Sara JANNETTE Edvin 1960      Chief Complaint   Patient presents with    Medication Check         Subjective:    64-year-old white female with history of depression and anxiety, obesity, diabetes, ESRD on dialysis, hypothyroidism, obstructive sleep apnea on CPAP, presents for follow up.  She is on Wellbutrin, Celexa. On trazodone 100 mg for sleep.      Patient is calm and cooperative.  Smiling at times.  Overall doing well. No suicidal thoughts.  Recently moved - unpacking.  Marital issues.  Coping well. Seeing a therapist which she finds helpful.  Denies the need for medication changes at this time.       /83   Pulse 92   Temp 97.7 °F (36.5 °C)   Ht 1.651 m (5' 5\")   Wt 124.7 kg (275 lb)   LMP 01/01/2000   SpO2 95%   BMI 45.76 kg/m²       Review of Systems - 14 point review:  Negative except for    Constitutional: (fevers, chills, night sweats, wt loss/gain, change in appetite, fatigue, somnolence)    HEENT: (ear pain or discharge, hearing loss, ear ringing, sinus pressure, nosebleed, nasal discharge, sore throat, oral sores, tooth pain, bleeding gums, hoarse voice, neck pain)      Cardiovascular: (HTN, chest pain, elevated cholesterol/lipids, palpitations, leg swelling, leg pain with walking)    Respiratory: (cough, wheezing, snoring, SOB with activity (dyspnea), SOB while lying flat (orthopnea), awakening with severe SOB (paroxysmal nocturnal dyspnea))    Gastrointestinal: (NVD, constipation, abdominal pain, bright red stools, black tarry stools, stool incontinence)     Genitourinary:  (pelvic pain, burning or frequency of urination, urinary urgency, blood in urine incomplete bladder emptying, urinary incontinence, STD; MEN: testicular pain or swelling, erectile dysfunction; WOMEN: LMP, heavy menstrual bleeding (menorrhagia), irregular periods, postmenopausal bleeding, menstrual pain (dymenorrhea, vaginal discharge)    Musculoskeletal: (bone pain/fracture,

## 2024-08-12 RX ORDER — BUPROPION HYDROCHLORIDE 100 MG/1
100 TABLET, EXTENDED RELEASE ORAL 2 TIMES DAILY
Qty: 180 TABLET | Refills: 0 | Status: SHIPPED | OUTPATIENT
Start: 2024-08-12

## 2024-08-12 NOTE — TELEPHONE ENCOUNTER
Sara Pardo called to request a refill on her medication.      Last office visit : 4/16/2024   Next office visit : 10/14/2024     Requested Prescriptions     Pending Prescriptions Disp Refills    buPROPion (WELLBUTRIN SR) 100 MG extended release tablet [Pharmacy Med Name: bupropion HCl  mg tablet,12 hr sustained-release] 180 tablet 0     Sig: TAKE ONE TABLET BY MOUTH 2 TIMES A DAY            Brandy Garcia LPN

## 2024-08-20 RX ORDER — FAMOTIDINE 20 MG/1
TABLET, FILM COATED ORAL
Qty: 90 TABLET | Refills: 1 | Status: SHIPPED | OUTPATIENT
Start: 2024-08-20

## 2024-09-09 ENCOUNTER — OFFICE VISIT (OUTPATIENT)
Dept: PULMONOLOGY | Age: 64
End: 2024-09-09
Payer: MEDICARE

## 2024-09-09 VITALS
SYSTOLIC BLOOD PRESSURE: 166 MMHG | HEART RATE: 76 BPM | OXYGEN SATURATION: 99 % | HEIGHT: 66 IN | TEMPERATURE: 96.7 F | DIASTOLIC BLOOD PRESSURE: 72 MMHG | WEIGHT: 293 LBS | BODY MASS INDEX: 47.09 KG/M2

## 2024-09-09 DIAGNOSIS — G47.33 OSA ON CPAP: Primary | ICD-10-CM

## 2024-09-09 DIAGNOSIS — R06.02 SHORTNESS OF BREATH: ICD-10-CM

## 2024-09-09 DIAGNOSIS — G47.8 NON-RESTORATIVE SLEEP: ICD-10-CM

## 2024-09-09 DIAGNOSIS — G47.10 HYPERSOMNIA: ICD-10-CM

## 2024-09-09 DIAGNOSIS — R01.1 HEART MURMUR: ICD-10-CM

## 2024-09-09 DIAGNOSIS — E66.01 MORBID OBESITY (HCC): ICD-10-CM

## 2024-09-09 DIAGNOSIS — R01.1 MURMUR, CARDIAC: ICD-10-CM

## 2024-09-09 PROCEDURE — 3017F COLORECTAL CA SCREEN DOC REV: CPT | Performed by: INTERNAL MEDICINE

## 2024-09-09 PROCEDURE — 3077F SYST BP >= 140 MM HG: CPT | Performed by: INTERNAL MEDICINE

## 2024-09-09 PROCEDURE — 99214 OFFICE O/P EST MOD 30 MIN: CPT | Performed by: INTERNAL MEDICINE

## 2024-09-09 PROCEDURE — G8417 CALC BMI ABV UP PARAM F/U: HCPCS | Performed by: INTERNAL MEDICINE

## 2024-09-09 PROCEDURE — 1036F TOBACCO NON-USER: CPT | Performed by: INTERNAL MEDICINE

## 2024-09-09 PROCEDURE — G8427 DOCREV CUR MEDS BY ELIG CLIN: HCPCS | Performed by: INTERNAL MEDICINE

## 2024-09-09 PROCEDURE — 3078F DIAST BP <80 MM HG: CPT | Performed by: INTERNAL MEDICINE

## 2024-09-09 RX ORDER — INSULIN LISPRO 100 [IU]/ML
INJECTION, SOLUTION INTRAVENOUS; SUBCUTANEOUS
COMMUNITY
Start: 2024-08-02

## 2024-09-09 ASSESSMENT — ENCOUNTER SYMPTOMS
ABDOMINAL DISTENTION: 0
COUGH: 0
CHEST TIGHTNESS: 0
ABDOMINAL PAIN: 0
ANAL BLEEDING: 0
SHORTNESS OF BREATH: 0
WHEEZING: 0
RHINORRHEA: 0
APNEA: 1
BACK PAIN: 0

## 2024-09-13 ENCOUNTER — HOSPITAL ENCOUNTER (OUTPATIENT)
Age: 64
Discharge: HOME | End: 2024-09-15
Attending: INTERNAL MEDICINE
Payer: MEDICARE

## 2024-09-13 VITALS
DIASTOLIC BLOOD PRESSURE: 62 MMHG | SYSTOLIC BLOOD PRESSURE: 131 MMHG | BODY MASS INDEX: 47.9 KG/M2 | HEIGHT: 65 IN | WEIGHT: 287.48 LBS

## 2024-09-13 DIAGNOSIS — R01.1 HEART MURMUR: ICD-10-CM

## 2024-09-13 DIAGNOSIS — R06.02 SHORTNESS OF BREATH: ICD-10-CM

## 2024-09-13 LAB
ECHO AO ASC DIAM: 3.2 CM
ECHO AO ASCENDING AORTA INDEX: 1.39 CM/M2
ECHO AO ROOT DIAM: 2.2 CM
ECHO AO ROOT INDEX: 0.95 CM/M2
ECHO AO SINUS VALSALVA DIAM: 3.7 CM
ECHO AO SINUS VALSALVA INDEX: 1.6 CM/M2
ECHO AO ST JNCT DIAM: 2.6 CM
ECHO AV AREA PEAK VELOCITY: 1.7 CM2
ECHO AV AREA VTI: 1.5 CM2
ECHO AV AREA/BSA PEAK VELOCITY: 0.7 CM2/M2
ECHO AV AREA/BSA VTI: 0.6 CM2/M2
ECHO AV MEAN GRADIENT: 12 MMHG
ECHO AV MEAN VELOCITY: 1.7 M/S
ECHO AV MEAN VELOCITY: 1.7 M/S
ECHO AV PEAK GRADIENT: 18 MMHG
ECHO AV PEAK VELOCITY: 2.1 M/S
ECHO AV VELOCITY RATIO: 0.48
ECHO AV VTI: 62.4 CM
ECHO BSA: 2.45 M2
ECHO EST RA PRESSURE: 3 MMHG
ECHO IVC PROX: 1.3 CM
ECHO LA AREA 2C: 29.7 CM2
ECHO LA AREA 4C: 31.8 CM2
ECHO LA DIAMETER INDEX: 1.69 CM/M2
ECHO LA DIAMETER: 3.9 CM
ECHO LA MAJOR AXIS: 8.4 CM
ECHO LA MINOR AXIS: 6.2 CM
ECHO LA TO AORTIC ROOT RATIO: 1.77
ECHO LA VOL MOD A2C: 109 ML (ref 22–52)
ECHO LA VOL MOD A4C: 105 ML (ref 22–52)
ECHO LA VOLUME INDEX MOD A2C: 47 ML/M2 (ref 16–34)
ECHO LA VOLUME INDEX MOD A4C: 45 ML/M2 (ref 16–34)
ECHO LV E' LATERAL VELOCITY: 4 CM/S
ECHO LV E' SEPTAL VELOCITY: 5 CM/S
ECHO LV EDV A2C: 168 ML
ECHO LV EDV A4C: 185 ML
ECHO LV EDV INDEX A4C: 80 ML/M2
ECHO LV EDV NDEX A2C: 73 ML/M2
ECHO LV EJECTION FRACTION A2C: 61 %
ECHO LV EJECTION FRACTION A4C: 60 %
ECHO LV EJECTION FRACTION BIPLANE: 60 % (ref 55–100)
ECHO LV ESV A2C: 67 ML
ECHO LV ESV A4C: 74 ML
ECHO LV ESV INDEX A2C: 29 ML/M2
ECHO LV ESV INDEX A4C: 32 ML/M2
ECHO LV FRACTIONAL SHORTENING: 31 % (ref 28–44)
ECHO LV INTERNAL DIMENSION DIASTOLE INDEX: 2.81 CM/M2
ECHO LV INTERNAL DIMENSION DIASTOLIC: 6.5 CM (ref 3.9–5.3)
ECHO LV INTERNAL DIMENSION SYSTOLIC INDEX: 1.95 CM/M2
ECHO LV INTERNAL DIMENSION SYSTOLIC: 4.5 CM
ECHO LV ISOVOLUMETRIC RELAXATION TIME (IVRT): 88 MS
ECHO LV IVSD: 1.1 CM (ref 0.6–0.9)
ECHO LV MASS 2D: 339.1 G (ref 67–162)
ECHO LV MASS INDEX 2D: 146.8 G/M2 (ref 43–95)
ECHO LV POSTERIOR WALL DIASTOLIC: 1.2 CM (ref 0.6–0.9)
ECHO LV RELATIVE WALL THICKNESS RATIO: 0.37
ECHO LVOT AREA: 3.5 CM2
ECHO LVOT AV VTI INDEX: 0.43
ECHO LVOT DIAM: 2.1 CM
ECHO LVOT MEAN GRADIENT: 2 MMHG
ECHO LVOT PEAK GRADIENT: 4 MMHG
ECHO LVOT PEAK VELOCITY: 1 M/S
ECHO LVOT STROKE VOLUME INDEX: 39.9 ML/M2
ECHO LVOT SV: 92.1 ML
ECHO LVOT VTI: 26.6 CM
ECHO MV A VELOCITY: 1.14 M/S
ECHO MV ANNULUS DIAMETER: 2.9 CM
ECHO MV AREA VTI: 1.9 CM2
ECHO MV E DECELERATION TIME (DT): 435 MS
ECHO MV E VELOCITY: 1.41 M/S
ECHO MV E/A RATIO: 1.24
ECHO MV E/E' LATERAL: 35.25
ECHO MV E/E' RATIO (AVERAGED): 31.73
ECHO MV E/E' SEPTAL: 28.2
ECHO MV LVOT VTI INDEX: 1.78
ECHO MV MAX VELOCITY: 1.5 M/S
ECHO MV MEAN GRADIENT: 4 MMHG
ECHO MV MEAN VELOCITY: 1 M/S
ECHO MV PEAK GRADIENT: 8 MMHG
ECHO MV VTI: 47.3 CM
ECHO RA AREA 4C: 13.6 CM2
ECHO RA END SYSTOLIC VOLUME APICAL 4 CHAMBER INDEX BSA: 12 ML/M2
ECHO RA MAJOR AXIS INDEX: 2.47 CM/M2
ECHO RA MAJOR AXIS: 5.7 CM
ECHO RA MINOR AXIS INDEX: 1.3 CM/M2
ECHO RA MINOR AXIS: 3 CM
ECHO RA VOLUME: 27 ML
ECHO RIGHT VENTRICULAR SYSTOLIC PRESSURE (RVSP): 26 MMHG
ECHO RV BASAL DIMENSION: 2.7 CM
ECHO RV INTERNAL DIMENSION: 4.2 CM
ECHO RV LONGITUDINAL DIMENSION: 8.9 CM
ECHO RV MID DIMENSION: 3.4 CM
ECHO RV TAPSE: 2.2 CM (ref 1.7–?)
ECHO TV REGURGITANT MAX VELOCITY: 2.39 M/S
ECHO TV REGURGITANT PEAK GRADIENT: 23 MMHG

## 2024-09-13 PROCEDURE — C8929 TTE W OR WO FOL WCON,DOPPLER: HCPCS

## 2024-09-13 PROCEDURE — 6360000004 HC RX CONTRAST MEDICATION: Performed by: INTERNAL MEDICINE

## 2024-09-13 PROCEDURE — 2580000003 HC RX 258: Performed by: INTERNAL MEDICINE

## 2024-09-13 RX ADMIN — SODIUM CHLORIDE, PRESERVATIVE FREE 1.5 ML: 5 INJECTION INTRAVENOUS at 10:05

## 2024-09-23 ENCOUNTER — TELEPHONE (OUTPATIENT)
Dept: PSYCHIATRY | Age: 64
End: 2024-09-23

## 2024-09-26 ENCOUNTER — OFFICE VISIT (OUTPATIENT)
Age: 64
End: 2024-09-26
Payer: MEDICARE

## 2024-09-26 VITALS
SYSTOLIC BLOOD PRESSURE: 148 MMHG | OXYGEN SATURATION: 98 % | HEIGHT: 66 IN | DIASTOLIC BLOOD PRESSURE: 90 MMHG | HEART RATE: 74 BPM | BODY MASS INDEX: 47.09 KG/M2 | WEIGHT: 293 LBS

## 2024-09-26 DIAGNOSIS — E11.42 TYPE 2 DIABETES MELLITUS WITH DIABETIC POLYNEUROPATHY, WITHOUT LONG-TERM CURRENT USE OF INSULIN: ICD-10-CM

## 2024-09-26 DIAGNOSIS — N18.4 CONTROLLED TYPE 2 DIABETES MELLITUS WITH STAGE 4 CHRONIC KIDNEY DISEASE, WITHOUT LONG-TERM CURRENT USE OF INSULIN: ICD-10-CM

## 2024-09-26 DIAGNOSIS — E11.22 CONTROLLED TYPE 2 DIABETES MELLITUS WITH STAGE 4 CHRONIC KIDNEY DISEASE, WITHOUT LONG-TERM CURRENT USE OF INSULIN: ICD-10-CM

## 2024-09-26 DIAGNOSIS — B35.1 ONYCHOMYCOSIS: Primary | ICD-10-CM

## 2024-09-26 DIAGNOSIS — Z99.2 STAGE 5 CHRONIC KIDNEY DISEASE ON CHRONIC DIALYSIS: ICD-10-CM

## 2024-09-26 DIAGNOSIS — N18.6 STAGE 5 CHRONIC KIDNEY DISEASE ON CHRONIC DIALYSIS: ICD-10-CM

## 2024-09-26 DIAGNOSIS — R26.9 GAIT ABNORMALITY: ICD-10-CM

## 2024-09-26 RX ORDER — B,C/FERROUS FUM/FA/D3/ZINC OX 8MG-800MCG
1 TABLET ORAL DAILY
COMMUNITY
Start: 2024-08-08

## 2024-09-27 DIAGNOSIS — M25.562 CHRONIC PAIN OF LEFT KNEE: Primary | ICD-10-CM

## 2024-09-27 DIAGNOSIS — G89.29 CHRONIC PAIN OF LEFT KNEE: Primary | ICD-10-CM

## 2024-09-30 ENCOUNTER — OFFICE VISIT (OUTPATIENT)
Age: 64
End: 2024-09-30

## 2024-09-30 VITALS — HEIGHT: 65 IN | WEIGHT: 285.06 LBS | BODY MASS INDEX: 47.49 KG/M2

## 2024-09-30 DIAGNOSIS — M17.12 PRIMARY OSTEOARTHRITIS OF LEFT KNEE: Primary | ICD-10-CM

## 2024-09-30 RX ORDER — BUPIVACAINE HYDROCHLORIDE 5 MG/ML
3 INJECTION, SOLUTION PERINEURAL ONCE
Status: COMPLETED | OUTPATIENT
Start: 2024-09-30 | End: 2024-09-30

## 2024-09-30 RX ORDER — BETAMETHASONE SODIUM PHOSPHATE AND BETAMETHASONE ACETATE 3; 3 MG/ML; MG/ML
6 INJECTION, SUSPENSION INTRA-ARTICULAR; INTRALESIONAL; INTRAMUSCULAR; SOFT TISSUE ONCE
Status: COMPLETED | OUTPATIENT
Start: 2024-09-30 | End: 2024-09-30

## 2024-09-30 RX ADMIN — BUPIVACAINE HYDROCHLORIDE 15 MG: 5 INJECTION, SOLUTION PERINEURAL at 14:45

## 2024-09-30 RX ADMIN — BETAMETHASONE SODIUM PHOSPHATE AND BETAMETHASONE ACETATE 6 MG: 3; 3 INJECTION, SUSPENSION INTRA-ARTICULAR; INTRALESIONAL; INTRAMUSCULAR; SOFT TISSUE at 14:44

## 2024-09-30 NOTE — PROGRESS NOTES
[facility]thopaedic Clinic Note - Established Patient    NAME:  Sara Pardo   : 1960  MRN: [id number]      2024      CHIEF COMPLAINT: Left knee pain      HISTORY OF PRESENT ILLNESS:   This is a pleasant 64-year-old white female comes in for left knee pain.  She has a history of osteoarthritis.  Patient reports the pain is constant, moderate, sharp throbbing achy.  She would like to proceed with surgery but realizes that she has a lot of medical issues.  She is here to discuss further treatment options.    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 Pt not sure of date, states fall 2023    CHF (congestive heart failure) ([facility])     CKD (chronic kidney disease)     stage 4    Colitis     Colon polyp     Depression     Embolism - blood clot     Hemodialysis patient ([facility])     5 days a week at home, takes  + one other day off each week    Hx of blood clots     Hyperlipidemia     Hypertension     Hypertension associated with diabetes ([facility]) 10/17/2016    Obesity, morbid, BMI 50 or higher     Sleep apnea     CPAP    Thyroid disease     Type II or unspecified type diabetes mellitus without mention of complication, not stated as uncontrolled        Past Surgical History:        Procedure Laterality Date    CHOLECYSTECTOMY, LAPAROSCOPIC      DIALYSIS CATHETER INSERTION N/A 2021    INSERTION CATHETER DIALYSIS performed by Dick Glaser MD at [facility] OR    DIALYSIS FISTULA CREATION Left 2021    LEFT BRACHIAL-CEPHALIC AV FISTULA CREATION performed by Dick Glaser MD at [facility] OR    DIALYSIS FISTULA CREATION Left 2021    LEFT UPPER EXTREMITY CEPHALIC VEIN SUPERFICIALIZATION performed by Dick Glaser MD at [facility] OR    DILATION AND

## 2024-10-03 ENCOUNTER — HOSPITAL ENCOUNTER (OUTPATIENT)
Dept: MAMMOGRAPHY | Facility: HOSPITAL | Age: 64
Discharge: HOME | End: 2024-10-03
Admitting: INTERNAL MEDICINE
Payer: MEDICARE

## 2024-10-03 DIAGNOSIS — Z12.31 SCREENING MAMMOGRAM FOR BREAST CANCER: ICD-10-CM

## 2024-10-03 PROCEDURE — 77067 SCR MAMMO BI INCL CAD: CPT

## 2024-10-03 PROCEDURE — 77063 BREAST TOMOSYNTHESIS BI: CPT

## 2024-10-04 DIAGNOSIS — G47.09 OTHER INSOMNIA: ICD-10-CM

## 2024-10-04 RX ORDER — TRAZODONE HYDROCHLORIDE 100 MG/1
100 TABLET ORAL NIGHTLY
Qty: 90 TABLET | Refills: 1 | Status: SHIPPED | OUTPATIENT
Start: 2024-10-04 | End: 2025-04-02

## 2024-10-08 ENCOUNTER — OFFICE VISIT (OUTPATIENT)
Dept: PULMONOLOGY | Age: 64
End: 2024-10-08
Payer: MEDICARE

## 2024-10-08 VITALS
WEIGHT: 290 LBS | HEIGHT: 66 IN | TEMPERATURE: 97 F | HEART RATE: 64 BPM | DIASTOLIC BLOOD PRESSURE: 72 MMHG | SYSTOLIC BLOOD PRESSURE: 131 MMHG | OXYGEN SATURATION: 99 % | BODY MASS INDEX: 46.61 KG/M2 | RESPIRATION RATE: 20 BRPM

## 2024-10-08 DIAGNOSIS — E66.01 MORBID OBESITY: ICD-10-CM

## 2024-10-08 DIAGNOSIS — G47.10 HYPERSOMNIA: ICD-10-CM

## 2024-10-08 DIAGNOSIS — R01.1 MURMUR, CARDIAC: ICD-10-CM

## 2024-10-08 DIAGNOSIS — G47.8 NON-RESTORATIVE SLEEP: ICD-10-CM

## 2024-10-08 DIAGNOSIS — G47.33 OSA ON CPAP: Primary | ICD-10-CM

## 2024-10-08 DIAGNOSIS — I34.81 MITRAL ANNULAR CALCIFICATION: ICD-10-CM

## 2024-10-08 PROCEDURE — G8427 DOCREV CUR MEDS BY ELIG CLIN: HCPCS | Performed by: INTERNAL MEDICINE

## 2024-10-08 PROCEDURE — G8484 FLU IMMUNIZE NO ADMIN: HCPCS | Performed by: INTERNAL MEDICINE

## 2024-10-08 PROCEDURE — 1036F TOBACCO NON-USER: CPT | Performed by: INTERNAL MEDICINE

## 2024-10-08 PROCEDURE — 99214 OFFICE O/P EST MOD 30 MIN: CPT | Performed by: INTERNAL MEDICINE

## 2024-10-08 PROCEDURE — 3075F SYST BP GE 130 - 139MM HG: CPT | Performed by: INTERNAL MEDICINE

## 2024-10-08 PROCEDURE — 3017F COLORECTAL CA SCREEN DOC REV: CPT | Performed by: INTERNAL MEDICINE

## 2024-10-08 PROCEDURE — 3078F DIAST BP <80 MM HG: CPT | Performed by: INTERNAL MEDICINE

## 2024-10-08 PROCEDURE — G8417 CALC BMI ABV UP PARAM F/U: HCPCS | Performed by: INTERNAL MEDICINE

## 2024-10-08 RX ORDER — CARVEDILOL 3.12 MG/1
3.12 TABLET ORAL
COMMUNITY
Start: 2024-09-25

## 2024-10-08 ASSESSMENT — ENCOUNTER SYMPTOMS
CHEST TIGHTNESS: 0
ANAL BLEEDING: 0
COUGH: 0
ABDOMINAL DISTENTION: 0
BACK PAIN: 0
RHINORRHEA: 0
APNEA: 0
ABDOMINAL PAIN: 0
SHORTNESS OF BREATH: 1
WHEEZING: 0

## 2024-10-08 NOTE — PROGRESS NOTES
fatigue.   HENT:  Negative for congestion, dental problem, drooling, ear discharge, postnasal drip and rhinorrhea.    Eyes:  Negative for visual disturbance.   Respiratory:  Positive for shortness of breath. Negative for apnea, cough, chest tightness and wheezing.    Gastrointestinal:  Negative for abdominal distention, abdominal pain and anal bleeding.   Endocrine: Negative for cold intolerance, heat intolerance and polydipsia.   Genitourinary:  Negative for difficulty urinating, dysuria, enuresis and flank pain.   Musculoskeletal:  Negative for arthralgias, back pain and gait problem.   Allergic/Immunologic: Negative for environmental allergies.   Neurological:  Negative for dizziness, facial asymmetry, light-headedness and headaches.       Vitals:    10/08/24 1023   BP: 131/72   Pulse: 64   Resp: 20   Temp: 97 °F (36.1 °C)   SpO2: 99%   Weight: 131.5 kg (290 lb)   Height: 1.664 m (5' 5.5\")      BMI Readings from Last 1 Encounters:   10/08/24 47.52 kg/m²         Physical Exam  Vitals reviewed.   Constitutional:       Appearance: Normal appearance. She is obese.   HENT:      Head: Normocephalic and atraumatic.      Nose: Nose normal.   Eyes:      Extraocular Movements: Extraocular movements intact.      Conjunctiva/sclera: Conjunctivae normal.   Cardiovascular:      Rate and Rhythm: Normal rate and regular rhythm.      Heart sounds: No murmur heard.     No friction rub.   Pulmonary:      Effort: Pulmonary effort is normal. No respiratory distress.      Breath sounds: Normal breath sounds. No stridor. No wheezing, rhonchi or rales.   Abdominal:      General: There is no distension.      Palpations: There is no mass.      Tenderness: There is no abdominal tenderness. There is no guarding or rebound.   Musculoskeletal:      Cervical back: Normal range of motion and neck supple.   Neurological:      Mental Status: She is alert and oriented to person, place, and time.             This note was generated using a voice

## 2024-10-09 DIAGNOSIS — Z12.31 ENCOUNTER FOR SCREENING MAMMOGRAM FOR BREAST CANCER: ICD-10-CM

## 2024-10-13 PROBLEM — I05.0 MITRAL STENOSIS: Status: ACTIVE | Noted: 2024-10-08

## 2024-10-13 PROBLEM — A41.9 SEPSIS (HCC): Status: RESOLVED | Noted: 2022-10-21 | Resolved: 2024-10-13

## 2024-10-13 PROBLEM — K76.9 DISEASE OF LIVER: Status: RESOLVED | Noted: 2019-10-09 | Resolved: 2024-10-13

## 2024-10-13 NOTE — PROGRESS NOTES
Eyes:      General: No scleral icterus.        Right eye: No discharge.         Left eye: No discharge.      Extraocular Movements: Extraocular movements intact.      Conjunctiva/sclera: Conjunctivae normal.      Pupils: Pupils are equal, round, and reactive to light.   Neck:      Thyroid: No thyromegaly.      Vascular: No carotid bruit or JVD.   Cardiovascular:      Rate and Rhythm: Normal rate and regular rhythm.      Chest Wall: PMI is not displaced.      Pulses: Normal pulses.      Heart sounds: Normal heart sounds, S1 normal and S2 normal. No murmur heard.  Pulmonary:      Effort: Pulmonary effort is normal. No respiratory distress.      Breath sounds: Normal breath sounds and air entry. No decreased air movement or transmitted upper airway sounds. No decreased breath sounds, wheezing or rales.   Abdominal:      General: Abdomen is flat. Bowel sounds are normal. There is no distension.      Palpations: Abdomen is soft. There is no shifting dullness, hepatomegaly, splenomegaly or mass.      Tenderness: There is no abdominal tenderness. There is no right CVA tenderness, left CVA tenderness, guarding or rebound.      Hernia: No hernia is present.   Musculoskeletal:         General: No deformity.      Cervical back: Normal range of motion and neck supple. No rigidity.      Right lower leg: No edema.      Left lower leg: No edema.   Lymphadenopathy:      Cervical: No cervical adenopathy.   Skin:     General: Skin is warm and moist.      Findings: No lesion or rash.   Neurological:      Mental Status: She is alert. Mental status is at baseline.      Cranial Nerves: No cranial nerve deficit.      Motor: Motor function is intact.      Coordination: Coordination is intact.      Gait: Gait abnormal.      Deep Tendon Reflexes: Reflexes normal.   Psychiatric:         Attention and Perception: Attention normal.         Speech: Speech normal.         Behavior: Behavior normal.         Thought Content: Thought content

## 2024-10-13 NOTE — ASSESSMENT & PLAN NOTE
Orders:    buPROPion (WELLBUTRIN SR) 100 MG extended release tablet; Take 1 tablet by mouth daily

## 2024-10-14 ENCOUNTER — OFFICE VISIT (OUTPATIENT)
Dept: PRIMARY CARE CLINIC | Age: 64
End: 2024-10-14
Payer: MEDICARE

## 2024-10-14 VITALS
HEIGHT: 66 IN | BODY MASS INDEX: 47.09 KG/M2 | SYSTOLIC BLOOD PRESSURE: 128 MMHG | HEART RATE: 61 BPM | OXYGEN SATURATION: 99 % | RESPIRATION RATE: 17 BRPM | DIASTOLIC BLOOD PRESSURE: 66 MMHG | WEIGHT: 293 LBS | TEMPERATURE: 97.3 F

## 2024-10-14 DIAGNOSIS — I10 PRIMARY HYPERTENSION: ICD-10-CM

## 2024-10-14 DIAGNOSIS — G47.33 OSA ON CPAP: ICD-10-CM

## 2024-10-14 DIAGNOSIS — Z86.718 HISTORY OF DVT (DEEP VEIN THROMBOSIS): ICD-10-CM

## 2024-10-14 DIAGNOSIS — I05.0 MITRAL VALVE STENOSIS, UNSPECIFIED ETIOLOGY: Primary | ICD-10-CM

## 2024-10-14 DIAGNOSIS — R30.0 DYSURIA: ICD-10-CM

## 2024-10-14 DIAGNOSIS — E03.9 ACQUIRED HYPOTHYROIDISM: ICD-10-CM

## 2024-10-14 DIAGNOSIS — B37.31 YEAST VAGINITIS: ICD-10-CM

## 2024-10-14 DIAGNOSIS — N18.6 TYPE 2 DIABETES MELLITUS WITH END-STAGE RENAL DISEASE (HCC): ICD-10-CM

## 2024-10-14 DIAGNOSIS — N18.6 ESRD ON PERITONEAL DIALYSIS (HCC): ICD-10-CM

## 2024-10-14 DIAGNOSIS — E11.22 TYPE 2 DIABETES MELLITUS WITH END-STAGE RENAL DISEASE (HCC): ICD-10-CM

## 2024-10-14 DIAGNOSIS — F32.1 MODERATE MAJOR DEPRESSION (HCC): ICD-10-CM

## 2024-10-14 DIAGNOSIS — R30.0 DYSURIA: Primary | ICD-10-CM

## 2024-10-14 DIAGNOSIS — N18.6 ANEMIA IN END-STAGE RENAL DISEASE (HCC): ICD-10-CM

## 2024-10-14 DIAGNOSIS — D63.1 ANEMIA IN END-STAGE RENAL DISEASE (HCC): ICD-10-CM

## 2024-10-14 DIAGNOSIS — R25.2 LEG CRAMPS: ICD-10-CM

## 2024-10-14 DIAGNOSIS — E78.1 HYPERTRIGLYCERIDEMIA: ICD-10-CM

## 2024-10-14 DIAGNOSIS — F41.9 ANXIETY: ICD-10-CM

## 2024-10-14 DIAGNOSIS — N12 PYELONEPHRITIS: ICD-10-CM

## 2024-10-14 DIAGNOSIS — K76.9 DISEASE OF LIVER: ICD-10-CM

## 2024-10-14 DIAGNOSIS — Z12.11 SCREENING FOR COLON CANCER: ICD-10-CM

## 2024-10-14 DIAGNOSIS — Z99.2 ESRD ON PERITONEAL DIALYSIS (HCC): ICD-10-CM

## 2024-10-14 DIAGNOSIS — E55.9 VITAMIN D DEFICIENCY: ICD-10-CM

## 2024-10-14 LAB
25(OH)D3 SERPL-MCNC: 43.9 NG/ML
ALBUMIN SERPL-MCNC: 4 G/DL (ref 3.5–5.2)
ALP SERPL-CCNC: 66 U/L (ref 35–104)
ALT SERPL-CCNC: 15 U/L (ref 5–33)
ANION GAP SERPL CALCULATED.3IONS-SCNC: 19 MMOL/L (ref 7–19)
AST SERPL-CCNC: 13 U/L (ref 5–32)
BACTERIA URNS QL MICRO: ABNORMAL /HPF
BILIRUB DIRECT SERPL-MCNC: 0.2 MG/DL (ref 0–0.3)
BILIRUB INDIRECT SERPL-MCNC: 0.3 MG/DL (ref 0–1)
BILIRUB SERPL-MCNC: 0.5 MG/DL (ref 0.2–1.2)
BILIRUB UR QL STRIP: NEGATIVE
BUN SERPL-MCNC: 85 MG/DL (ref 8–23)
CALCIUM SERPL-MCNC: 10.1 MG/DL (ref 8.8–10.2)
CHLORIDE SERPL-SCNC: 93 MMOL/L (ref 98–111)
CHOLEST SERPL-MCNC: 82 MG/DL (ref 0–199)
CLARITY UR: ABNORMAL
CO2 SERPL-SCNC: 25 MMOL/L (ref 22–29)
COLOR UR: YELLOW
CREAT SERPL-MCNC: 10.4 MG/DL (ref 0.5–0.9)
CRYSTALS URNS MICRO: ABNORMAL /HPF
ERYTHROCYTE [DISTWIDTH] IN BLOOD BY AUTOMATED COUNT: 15.9 % (ref 11.5–14.5)
GLUCOSE SERPL-MCNC: 162 MG/DL (ref 70–99)
GLUCOSE UR STRIP.AUTO-MCNC: 100 MG/DL
HBA1C MFR BLD: 6.2 % (ref 4–5.6)
HCT VFR BLD AUTO: 33.5 % (ref 37–47)
HDLC SERPL-MCNC: 34 MG/DL (ref 40–60)
HGB BLD-MCNC: 10.6 G/DL (ref 12–16)
HGB UR STRIP.AUTO-MCNC: NEGATIVE MG/L
HYALINE CASTS #/AREA URNS LPF: ABNORMAL /LPF (ref 0–5)
KETONES UR STRIP.AUTO-MCNC: NEGATIVE MG/DL
LDLC SERPL CALC-MCNC: 20 MG/DL
LEUKOCYTE ESTERASE UR QL STRIP.AUTO: ABNORMAL
MCH RBC QN AUTO: 33.9 PG (ref 27–31)
MCHC RBC AUTO-ENTMCNC: 31.6 G/DL (ref 33–37)
MCV RBC AUTO: 107 FL (ref 81–99)
NITRITE UR QL STRIP.AUTO: NEGATIVE
PH UR STRIP.AUTO: 8 [PH] (ref 5–8)
PLATELET # BLD AUTO: 184 K/UL (ref 130–400)
PMV BLD AUTO: 10 FL (ref 9.4–12.3)
POTASSIUM SERPL-SCNC: 5.3 MMOL/L (ref 3.5–5)
PROT SERPL-MCNC: 6.8 G/DL (ref 6.4–8.3)
PROT UR STRIP.AUTO-MCNC: 100 MG/DL
RBC # BLD AUTO: 3.13 M/UL (ref 4.2–5.4)
RBC #/AREA URNS HPF: ABNORMAL /HPF (ref 0–2)
SODIUM SERPL-SCNC: 137 MMOL/L (ref 136–145)
SP GR UR STRIP.AUTO: 1.01 (ref 1–1.03)
SQUAMOUS #/AREA URNS HPF: ABNORMAL /HPF
TRIGL SERPL-MCNC: 138 MG/DL (ref 0–149)
TSH SERPL DL<=0.005 MIU/L-ACNC: 2.05 UIU/ML (ref 0.27–4.2)
UROBILINOGEN UR STRIP.AUTO-MCNC: 0.2 E.U./DL
WBC # BLD AUTO: 13.1 K/UL (ref 4.8–10.8)
WBC #/AREA URNS HPF: ABNORMAL /HPF (ref 0–5)
YEAST #/AREA URNS HPF: PRESENT /HPF

## 2024-10-14 PROCEDURE — G8484 FLU IMMUNIZE NO ADMIN: HCPCS | Performed by: INTERNAL MEDICINE

## 2024-10-14 PROCEDURE — 3074F SYST BP LT 130 MM HG: CPT | Performed by: INTERNAL MEDICINE

## 2024-10-14 PROCEDURE — 99214 OFFICE O/P EST MOD 30 MIN: CPT | Performed by: INTERNAL MEDICINE

## 2024-10-14 PROCEDURE — G8427 DOCREV CUR MEDS BY ELIG CLIN: HCPCS | Performed by: INTERNAL MEDICINE

## 2024-10-14 PROCEDURE — G8417 CALC BMI ABV UP PARAM F/U: HCPCS | Performed by: INTERNAL MEDICINE

## 2024-10-14 PROCEDURE — 3017F COLORECTAL CA SCREEN DOC REV: CPT | Performed by: INTERNAL MEDICINE

## 2024-10-14 PROCEDURE — 1036F TOBACCO NON-USER: CPT | Performed by: INTERNAL MEDICINE

## 2024-10-14 PROCEDURE — 3078F DIAST BP <80 MM HG: CPT | Performed by: INTERNAL MEDICINE

## 2024-10-14 RX ORDER — BUPROPION HYDROCHLORIDE 100 MG/1
100 TABLET, EXTENDED RELEASE ORAL DAILY
Qty: 90 TABLET | Refills: 1 | Status: SHIPPED | OUTPATIENT
Start: 2024-10-14 | End: 2025-04-12

## 2024-10-14 RX ORDER — LEVOTHYROXINE SODIUM 125 UG/1
125 TABLET ORAL DAILY
Qty: 90 TABLET | Refills: 1
Start: 2024-10-14 | End: 2025-04-12

## 2024-10-14 RX ORDER — FLUCONAZOLE 150 MG/1
150 TABLET ORAL DAILY
Qty: 3 TABLET | Refills: 0 | Status: SHIPPED | OUTPATIENT
Start: 2024-10-14 | End: 2024-10-17

## 2024-10-14 ASSESSMENT — ENCOUNTER SYMPTOMS
ABDOMINAL PAIN: 0
BACK PAIN: 0
SINUS PAIN: 0
BLOOD IN STOOL: 0
SHORTNESS OF BREATH: 1
CONSTIPATION: 0
TROUBLE SWALLOWING: 0
NAUSEA: 0
DIARRHEA: 0
COUGH: 0
SORE THROAT: 0
APNEA: 0
RHINORRHEA: 0
VOMITING: 0
ABDOMINAL DISTENTION: 0
CHEST TIGHTNESS: 0
WHEEZING: 0

## 2024-10-14 NOTE — ASSESSMENT & PLAN NOTE
Chronic, at goal (stable), continue current treatment plan          36 year old male with ESRD secondary to FSGS s/p renal transplant 2004 with graft failure on PD, HTN presenting with positive culture from peritoneal fluid.    Patient states abd pain started about two weeks ago, improved with outpatient abx, but then returned. Currently without abd pain. Outpatient peritoneal cx from 10/5 with ESBL Kleb pneumo.    Recommend:  #Peritonitis associated with PD catheter with ESBL Kleb  -Continue ertapenem 500 mg IV q 24 hours  -F/U blood cultures so far no growth  -F/U peritoneal fluid cultures  -Discussed with Nephrology Dr. Boyle and will arrange for outpatient IP meropenem for 21 days to complete 10/30/20. Will need surveillance cell count and culture after completing abx.     #Transplanted kidney, FSGS  -Continue PD    Jai Zepeda MD  Pager [phone]  After 5pm/weekends call [phone]    Discussed plan with primary team.

## 2024-10-16 LAB — BACTERIA UR CULT: NORMAL

## 2024-10-21 ENCOUNTER — OFFICE VISIT (OUTPATIENT)
Dept: CARDIOLOGY CLINIC | Age: 64
End: 2024-10-21
Payer: MEDICARE

## 2024-10-21 VITALS
BODY MASS INDEX: 48.82 KG/M2 | HEART RATE: 64 BPM | SYSTOLIC BLOOD PRESSURE: 138 MMHG | OXYGEN SATURATION: 97 % | WEIGHT: 293 LBS | RESPIRATION RATE: 20 BRPM | DIASTOLIC BLOOD PRESSURE: 90 MMHG | HEIGHT: 65 IN

## 2024-10-21 DIAGNOSIS — I10 ESSENTIAL HYPERTENSION: ICD-10-CM

## 2024-10-21 DIAGNOSIS — I38 VALVULAR HEART DISEASE: ICD-10-CM

## 2024-10-21 DIAGNOSIS — E78.5 DYSLIPIDEMIA: ICD-10-CM

## 2024-10-21 DIAGNOSIS — R01.1 HEART MURMUR: Primary | ICD-10-CM

## 2024-10-21 DIAGNOSIS — R07.9 CHEST PAIN, UNSPECIFIED TYPE: ICD-10-CM

## 2024-10-21 PROCEDURE — 99204 OFFICE O/P NEW MOD 45 MIN: CPT | Performed by: NURSE PRACTITIONER

## 2024-10-21 PROCEDURE — 93000 ELECTROCARDIOGRAM COMPLETE: CPT | Performed by: NURSE PRACTITIONER

## 2024-10-21 PROCEDURE — G8427 DOCREV CUR MEDS BY ELIG CLIN: HCPCS | Performed by: NURSE PRACTITIONER

## 2024-10-21 PROCEDURE — 1036F TOBACCO NON-USER: CPT | Performed by: NURSE PRACTITIONER

## 2024-10-21 PROCEDURE — 3075F SYST BP GE 130 - 139MM HG: CPT | Performed by: NURSE PRACTITIONER

## 2024-10-21 PROCEDURE — G8417 CALC BMI ABV UP PARAM F/U: HCPCS | Performed by: NURSE PRACTITIONER

## 2024-10-21 PROCEDURE — G8484 FLU IMMUNIZE NO ADMIN: HCPCS | Performed by: NURSE PRACTITIONER

## 2024-10-21 PROCEDURE — 3017F COLORECTAL CA SCREEN DOC REV: CPT | Performed by: NURSE PRACTITIONER

## 2024-10-21 PROCEDURE — 3078F DIAST BP <80 MM HG: CPT | Performed by: NURSE PRACTITIONER

## 2024-10-21 RX ORDER — EVOLOCUMAB 140 MG/ML
INJECTION, SOLUTION SUBCUTANEOUS
COMMUNITY
Start: 2024-10-14

## 2024-10-21 ASSESSMENT — ENCOUNTER SYMPTOMS
SORE THROAT: 0
SHORTNESS OF BREATH: 1
WHEEZING: 0
CHEST TIGHTNESS: 0
COUGH: 0

## 2024-10-21 NOTE — PATIENT INSTRUCTIONS
[place] at the [facility] [place] located on the first floor of Psychiatric.   Enter through hospital main entrance and turn immediately to your left.    Patient's contact number:  [phone] (home)      Lexiscan Stress Test      Lexiscan (regadenoson injection) is a prescription drug given through an IV line that increases blood flow through the arteries of the heart during a cardiac nuclear stress test.     There are two parts to a Lexiscan stress test: the rest portion and the exercise portion. For the rest portion, a radioactive tracer is injected into your arm through the IV.  After 30 to 60 minutes, the process of imaging will begin.  A nuclear camera will be placed on your chest area and images are taken for the next 15 to 20 minutes.  For the exercise portion, a nurse will attach EKG electrodes to your chest to monitor your heart rate.  The drug Lexiscan is administered to simulate stress on the heart.  Your heart rhythm will then be monitored for the next few minutes. Your blood pressure will also be monitored throughout the exercise portion.  [place] through the exercise portion, a second round of radioactive tracer is injected into your body.  Your heart rate and EKG will be monitored for another few minutes after administering the drug.    Test Preparation:    Bring a list of your current medications.  Do not take any of your medications the morning of the test, but bring all morning medications with you as you will take them after the stress portion of the test is completed.    Do not eat Bananas 24 hours prior to test.    No caffeine 24 hours prior to the testing.  This includes: coffee, pop/soda, chocolate, cold medications, etc.  Any product that might contain caffeine.    No nicotine or alcohol 12 hours prior to your test.   Nothing to eat or drink 6-8 hours prior to appointment time.  It is okay to drink small amounts of water during the four hours prior to the

## 2024-10-21 NOTE — PROGRESS NOTES
[facility]  [address]  [address]  [facility] [address]  [phone]      Chief Complaint / Reason for Being Seen: Referral to Dr. Gutierrez for abnormal 2D echo    1. Heart murmur    2. Chest pain, unspecified type    3. Valvular heart disease    4. Essential hypertension    5. Dyslipidemia        Patient with a history of chronic kidney disease on dialysis 3 times a week, diabetes, hypothyroidism, hyperlipidemia, extreme obesity, obstructive sleep apnea on CPAP.  Patient being followed by pulmonary.  She does have a family history of CAD which includes her father.  She is a non-smoker.    Patient being referred to our office by pulmonary for 2D echo showing mild to moderate mitral stenosis.    Patient states she has noted for a couple months now chest pain located in center of her chest.  Lasting less than 2 to 3 minutes per episode.  Can occur with rest and activity.  She is also noted decreased stamina and fatigue.  Patient had been on Coreg 6.25 twice a day.  Her blood pressures were running in the 90 systolic and she was very lightheaded.  She did lower that dose to 3.125 and has only been taking it daily.  However this week she has noted her blood pressures somewhat elevating.  She is going back this week to taking it twice a day.    2D echo 9/13/2024.    Left Ventricle: Normal left ventricular systolic function with a visually estimated EF of 60 - 65%. Left ventricle is severely dilated. Mildly increased wall thickness. Normal wall motion.    Aortic Valve: Trileaflet valve. Mild sclerosis of the aortic valve cusps. Mild annular calcification.    Mitral Valve: Severe annular calcification at the posterior leaflet. No leaflet thickening. Mild to moderate stenosis noted. MV mean gradient is 5-6 mmHg.    Left Atrium: Left atrium is moderately dilated.    Interatrial Septum: Agitated saline study was negative with and without provocation.    Image quality is adequate. Contrast used: Definity.

## 2024-10-22 ENCOUNTER — TELEPHONE (OUTPATIENT)
Dept: PSYCHIATRY | Age: 64
End: 2024-10-22

## 2024-10-22 NOTE — TELEPHONE ENCOUNTER
Called and reschedule appt with pt on 11/12 @ 10:30 am due to Dr. Norton being out of the office that day     Appt rescheduled for 1/16/25 @ 1 pm      Pt was also put on cancellation list     Electronically signed by Jeff Trujillo on 10/22/2024 at 2:39 PM

## 2024-10-28 ENCOUNTER — HOSPITAL ENCOUNTER (OUTPATIENT)
Dept: NUCLEAR MEDICINE | Age: 64
Discharge: HOME | End: 2024-10-30
Payer: MEDICARE

## 2024-10-28 DIAGNOSIS — R07.9 CHEST PAIN, UNSPECIFIED TYPE: ICD-10-CM

## 2024-10-28 LAB
NUC STRESS EJECTION FRACTION: 68 %
STRESS BASELINE DIAS BP: 60 MMHG
STRESS BASELINE HR: 63 BPM
STRESS BASELINE SYS BP: 164 MMHG
STRESS ESTIMATED WORKLOAD: 1 METS
STRESS EXERCISE DUR MIN: 5 MIN
STRESS EXERCISE DUR SEC: 0 SEC
STRESS PEAK DIAS BP: 60 MMHG
STRESS PEAK SYS BP: 164 MMHG
STRESS PERCENT HR ACHIEVED: 60 %
STRESS POST PEAK HR: 93 BPM
STRESS RATE PRESSURE PRODUCT: NORMAL BPM*MMHG
STRESS TARGET HR: 156 BPM

## 2024-10-28 PROCEDURE — 6360000002 HC RX W HCPCS: Performed by: NURSE PRACTITIONER

## 2024-10-28 PROCEDURE — 93016 CV STRESS TEST SUPVJ ONLY: CPT | Performed by: INTERNAL MEDICINE

## 2024-10-28 PROCEDURE — A9502 TC99M TETROFOSMIN: HCPCS | Performed by: NURSE PRACTITIONER

## 2024-10-28 PROCEDURE — 93017 CV STRESS TEST TRACING ONLY: CPT

## 2024-10-28 PROCEDURE — 78452 HT MUSCLE IMAGE SPECT MULT: CPT | Performed by: INTERNAL MEDICINE

## 2024-10-28 PROCEDURE — 3430000000 HC RX DIAGNOSTIC RADIOPHARMACEUTICAL: Performed by: NURSE PRACTITIONER

## 2024-10-28 PROCEDURE — 93018 CV STRESS TEST I&R ONLY: CPT | Performed by: INTERNAL MEDICINE

## 2024-10-28 RX ORDER — REGADENOSON 0.08 MG/ML
0.4 INJECTION, SOLUTION INTRAVENOUS
Status: COMPLETED | OUTPATIENT
Start: 2024-10-28 | End: 2024-10-28

## 2024-10-28 RX ADMIN — TETROFOSMIN 24 MILLICURIE: 0.23 INJECTION, POWDER, LYOPHILIZED, FOR SOLUTION INTRAVENOUS at 12:45

## 2024-10-28 RX ADMIN — TETROFOSMIN 8 MILLICURIE: 0.23 INJECTION, POWDER, LYOPHILIZED, FOR SOLUTION INTRAVENOUS at 11:20

## 2024-10-28 RX ADMIN — REGADENOSON 0.4 MG: 0.08 INJECTION, SOLUTION INTRAVENOUS at 12:43

## 2024-10-29 DIAGNOSIS — Z12.11 SCREENING FOR COLON CANCER: ICD-10-CM

## 2024-10-29 LAB
CONTROL: REACTIVE
FECAL BLOOD IMMUNOCHEMICAL TEST: NEGATIVE

## 2024-10-29 PROCEDURE — 82274 ASSAY TEST FOR BLOOD FECAL: CPT | Performed by: INTERNAL MEDICINE

## 2024-10-30 ENCOUNTER — TELEPHONE (OUTPATIENT)
Dept: CARDIOLOGY CLINIC | Age: 64
End: 2024-10-30

## 2024-10-30 DIAGNOSIS — R06.02 SHORTNESS OF BREATH: ICD-10-CM

## 2024-10-30 DIAGNOSIS — R94.39 POSITIVE CARDIAC STRESS TEST: Primary | ICD-10-CM

## 2024-10-30 DIAGNOSIS — R07.9 CHEST PAIN, UNSPECIFIED TYPE: ICD-10-CM

## 2024-10-30 NOTE — TELEPHONE ENCOUNTER
----- Message from Dr. Tai Nuñez MD sent at 10/29/2024  3:44 PM CDT -----  Regarding: RE: Labs  She must be on hemodialysis and can schedule on nondialysis day.  ----- Message -----  From: Juany Red MA  Sent: 10/29/2024  10:11 AM CDT  To: Tai Nuñez MD  Subject: Labs                                             Coco Maher sent this to me to schedule heart cath for a positive Lexiscan. Her creatinine on 10/14/24 was 10.4. Please advise.  ----- Message -----  From: Marilee Clark MA  Sent: 10/28/2024   4:15 PM CDT  To: Juany Red MA      ----- Message -----  From: Coco Trevizo APRN - SCOUT  Sent: 10/28/2024   2:38 PM CDT  To: Marilee Clark MA    This is a new patient to the practice.  Positive Lexiscan.  Please schedule cardiac catheterization with Dr. Nuñez.  
slipped over the wire and threaded up to your heart.  The doctor will be taking x-ray pictures during the procedure to know where the wire and catheter are. Dye will be injected into the arteries of the heart. This will make the arteries and heart show up on the x-ray images. You may feel warm during the dye injection.     Insertion of Catheter with Guide Wire    Once in place, the catheter can be used to take measurements. Blood pressure can be taken within the heart's different chambers. Blood samples may also be taken. Multiple x-ray images will be taken to look for any disease in the arteries. An aortogram may also be done at this time. This step will give a clear image of the aorta (large artery leaving the heart). Once all the tests and images are complete, the catheter will be removed.  Sometimes, the doctor will perform balloon angioplasty and stenting if he finds an area in your arteries that is narrow or clogged. These are procedures that help to open narrowed arteries.  Finally, a bandage will be placed over the groin or arm area.     How Long Will It Take?  The procedure takes about 1-2 hours. Preparation before the test will take another 1-2 hours.      How Much Will It Hurt?  Although the procedure is generally not painful, it can cause some discomfort, including:   Burning sensation (when skin at catheter insertion site is anesthetized)   Pressure when catheter is inserted or replaced with other catheters   A flushing feeling or nausea when the dye is injected   Headache   Heart palpitations   Pain medicine will be given when needed.     Average Hospital Stay:  0-1 days     Postoperative Care At the Care Center   EKG and blood studies may be done.   You will likely need to lie still and flat on your back for a period of time. A pressure dressing may be placed over the area where the catheter was inserted to help prevent bleeding. It is important to follow the nurse's directions.   At Home   When you

## 2024-10-31 ENCOUNTER — TRANSCRIBE ORDERS (OUTPATIENT)
Dept: ADMINISTRATIVE | Facility: HOSPITAL | Age: 64
End: 2024-10-31
Payer: MEDICARE

## 2024-10-31 DIAGNOSIS — E11.649 TYPE 2 DIABETES MELLITUS WITH HYPOGLYCEMIA WITHOUT COMA, WITH LONG-TERM CURRENT USE OF INSULIN: Primary | ICD-10-CM

## 2024-10-31 DIAGNOSIS — Z79.4 TYPE 2 DIABETES MELLITUS WITH HYPOGLYCEMIA WITHOUT COMA, WITH LONG-TERM CURRENT USE OF INSULIN: Primary | ICD-10-CM

## 2024-11-03 NOTE — H&P
Patient:  Sara Pardo                  1960  MRN: [id number]    PROBLEM LIST:    Patient Active Problem List    Diagnosis Date Noted    Anemia in end-stage renal disease (HCC) 02/09/2023     Priority: Medium    Great toe pain, left 01/11/2023     Priority: Medium    Colitis 10/22/2022     Priority: Medium    Pyelonephritis 06/25/2022     Priority: Medium    Positive cardiac stress test 10/30/2024     Priority: Low    Leg cramps 10/14/2024     Priority: Low    Dysuria 10/14/2024     Priority: Low    Mitral stenosis 10/08/2024     Priority: Low     Overview Note:      Left Ventricle: Normal left ventricular systolic function with a visually estimated EF of 60 - 65%. Left ventricle is severely dilated. Mildly increased wall thickness. Normal wall motion.    Aortic Valve: Trileaflet valve. Mild sclerosis of the aortic valve cusps. Mild annular calcification.    Mitral Valve: Severe annular calcification at the posterior leaflet. No leaflet thickening. Mild to moderate stenosis noted. MV mean gradient is 5-6 mmHg.    Left Atrium: Left atrium is moderately dilated.    Interatrial Septum: Agitated saline study was negative with and without provocation.    Image quality is adequate. Contrast used: Definity. Technically difficult study.      Nonrheumatic aortic valve insufficiency 04/16/2024     Priority: Low    Hypersomnia 03/06/2024     Priority: Low    Right carpal tunnel syndrome 08/02/2023     Priority: Low    Left carpal tunnel syndrome 05/24/2023     Priority: Low    Gastric polyps 04/11/2023     Priority: Low     Overview Note:     Formatting of this note might be different from the original.  Added automatically from request for surgery [id number]      Right trigeminal neuralgia 04/06/2023     Priority: Low    Extreme obesity 03/30/2023     Priority: Low    Non-restorative sleep 03/30/2023     Priority: Low    Nonobstructive reflux-associated chronic pyelonephritis 06/25/2022     Priority: Low    Secondary  Partner Violence: Not At Risk (6/13/2024)    [facility]    Abuse Screen     Feels Unsafe at Home or Work/School: no     Feels Threatened by Someone: no     Does Anyone Try to Keep You From Having Contact with Others or Doing Things Outside Your Home?: no     Physical Signs of Abuse Present: no   Housing Stability: Unknown (5/29/2024)    [facility]    Housing Stability     Current Living Arrangements: Not on file     Potentially Unsafe Housing Conditions: Not on file       Family History:       Problem Relation Age of Onset    Lung Cancer Mother     Brain Cancer Mother     Heart Attack Father     Prostate Cancer Father     Heart Disease Father     Stroke Father     Obesity Father     No Known Problems Brother     Uterine Cancer Maternal Grandmother     Cervical Cancer Maternal Grandmother     Diabetes Maternal Grandfather     Other Maternal Grandfather         histoplasmosis    Obesity Paternal Grandmother     Diabetes Paternal Grandfather     Kidney Disease Paternal Grandfather     Other Daughter         Tourettes, Lidia Tabes syndrome     Physical Exam:    Vitals: LMP 01/01/2000   24HR INTAKE/OUTPUT:  No intake or output data in the 24 hours ending 11/03/24 0923    Physical Exam  Constitutional:       General: She is not in acute distress.     Appearance: She is obese. She is not diaphoretic.   HENT:      Mouth/Throat:      Pharynx: No oropharyngeal exudate.   Eyes:      General: No scleral icterus.        Right eye: No discharge.         Left eye: No discharge.   Neck:      Thyroid: No thyromegaly.      Vascular: No JVD.   Cardiovascular:      Rate and Rhythm: Normal rate and regular rhythm. No extrasystoles are present.     Heart sounds: Normal heart sounds, S1 normal and S2 normal. No murmur heard.     No systolic murmur is present.      No diastolic murmur is present.      No friction rub. No

## 2024-11-04 ENCOUNTER — HOSPITAL ENCOUNTER (OUTPATIENT)
Age: 64
Setting detail: OUTPATIENT SURGERY
Discharge: HOME | End: 2024-11-04
Attending: INTERNAL MEDICINE | Admitting: INTERNAL MEDICINE
Payer: MEDICARE

## 2024-11-04 VITALS
RESPIRATION RATE: 15 BRPM | BODY MASS INDEX: 46.61 KG/M2 | WEIGHT: 290 LBS | DIASTOLIC BLOOD PRESSURE: 61 MMHG | SYSTOLIC BLOOD PRESSURE: 120 MMHG | HEART RATE: 71 BPM | HEIGHT: 66 IN | OXYGEN SATURATION: 96 % | TEMPERATURE: 97.5 F

## 2024-11-04 DIAGNOSIS — R94.39 POSITIVE CARDIAC STRESS TEST: ICD-10-CM

## 2024-11-04 LAB
ANION GAP SERPL CALCULATED.3IONS-SCNC: 17 MMOL/L (ref 7–19)
BUN SERPL-MCNC: 65 MG/DL (ref 8–23)
CALCIUM SERPL-MCNC: 10 MG/DL (ref 8.8–10.2)
CHLORIDE SERPL-SCNC: 91 MMOL/L (ref 98–111)
CO2 SERPL-SCNC: 26 MMOL/L (ref 22–29)
CREAT SERPL-MCNC: 9.3 MG/DL (ref 0.5–0.9)
ECHO BSA: 2.47 M2
ERYTHROCYTE [DISTWIDTH] IN BLOOD BY AUTOMATED COUNT: 16 % (ref 11.5–14.5)
GLUCOSE SERPL-MCNC: 240 MG/DL (ref 70–99)
HCT VFR BLD AUTO: 36.9 % (ref 37–47)
HGB BLD-MCNC: 11.3 G/DL (ref 12–16)
MCH RBC QN AUTO: 32.2 PG (ref 27–31)
MCHC RBC AUTO-ENTMCNC: 30.6 G/DL (ref 33–37)
MCV RBC AUTO: 105.1 FL (ref 81–99)
PLATELET # BLD AUTO: 223 K/UL (ref 130–400)
PMV BLD AUTO: 9.5 FL (ref 9.4–12.3)
POTASSIUM SERPL-SCNC: 4.3 MMOL/L (ref 3.5–5)
RBC # BLD AUTO: 3.51 M/UL (ref 4.2–5.4)
SODIUM SERPL-SCNC: 134 MMOL/L (ref 136–145)
WBC # BLD AUTO: 9 K/UL (ref 4.8–10.8)

## 2024-11-04 PROCEDURE — 6360000004 HC RX CONTRAST MEDICATION: Performed by: INTERNAL MEDICINE

## 2024-11-04 PROCEDURE — 7100000010 HC PHASE II RECOVERY - FIRST 15 MIN: Performed by: INTERNAL MEDICINE

## 2024-11-04 PROCEDURE — B2111ZZ FLUOROSCOPY OF MULTIPLE CORONARY ARTERIES USING LOW OSMOLAR CONTRAST: ICD-10-PCS | Performed by: INTERNAL MEDICINE

## 2024-11-04 PROCEDURE — 99152 MOD SED SAME PHYS/QHP 5/>YRS: CPT | Performed by: INTERNAL MEDICINE

## 2024-11-04 PROCEDURE — 6360000002 HC RX W HCPCS: Performed by: INTERNAL MEDICINE

## 2024-11-04 PROCEDURE — 2500000003 HC RX 250 WO HCPCS: Performed by: INTERNAL MEDICINE

## 2024-11-04 PROCEDURE — 6370000000 HC RX 637 (ALT 250 FOR IP): Performed by: INTERNAL MEDICINE

## 2024-11-04 PROCEDURE — 93458 L HRT ARTERY/VENTRICLE ANGIO: CPT | Performed by: INTERNAL MEDICINE

## 2024-11-04 PROCEDURE — 36415 COLL VENOUS BLD VENIPUNCTURE: CPT

## 2024-11-04 PROCEDURE — 7100000011 HC PHASE II RECOVERY - ADDTL 15 MIN: Performed by: INTERNAL MEDICINE

## 2024-11-04 PROCEDURE — 99153 MOD SED SAME PHYS/QHP EA: CPT | Performed by: INTERNAL MEDICINE

## 2024-11-04 PROCEDURE — 85027 COMPLETE CBC AUTOMATED: CPT

## 2024-11-04 PROCEDURE — C1760 CLOSURE DEV, VASC: HCPCS | Performed by: INTERNAL MEDICINE

## 2024-11-04 PROCEDURE — 2709999900 HC NON-CHARGEABLE SUPPLY: Performed by: INTERNAL MEDICINE

## 2024-11-04 PROCEDURE — C1894 INTRO/SHEATH, NON-LASER: HCPCS | Performed by: INTERNAL MEDICINE

## 2024-11-04 PROCEDURE — B2151ZZ FLUOROSCOPY OF LEFT HEART USING LOW OSMOLAR CONTRAST: ICD-10-PCS | Performed by: INTERNAL MEDICINE

## 2024-11-04 PROCEDURE — 80048 BASIC METABOLIC PNL TOTAL CA: CPT

## 2024-11-04 PROCEDURE — C1769 GUIDE WIRE: HCPCS | Performed by: INTERNAL MEDICINE

## 2024-11-04 PROCEDURE — 99222 1ST HOSP IP/OBS MODERATE 55: CPT | Performed by: THORACIC SURGERY (CARDIOTHORACIC VASCULAR SURGERY)

## 2024-11-04 PROCEDURE — 4A023N7 MEASUREMENT OF CARDIAC SAMPLING AND PRESSURE, LEFT HEART, PERCUTANEOUS APPROACH: ICD-10-PCS | Performed by: INTERNAL MEDICINE

## 2024-11-04 PROCEDURE — 2580000003 HC RX 258: Performed by: INTERNAL MEDICINE

## 2024-11-04 RX ORDER — SODIUM CHLORIDE 9 MG/ML
INJECTION, SOLUTION INTRAVENOUS PRN
Status: DISCONTINUED | OUTPATIENT
Start: 2024-11-04 | End: 2024-11-04 | Stop reason: HOSPADM

## 2024-11-04 RX ORDER — FENTANYL CITRATE 50 UG/ML
INJECTION, SOLUTION INTRAMUSCULAR; INTRAVENOUS PRN
Status: DISCONTINUED | OUTPATIENT
Start: 2024-11-04 | End: 2024-11-04 | Stop reason: HOSPADM

## 2024-11-04 RX ORDER — ACETAMINOPHEN 325 MG/1
650 TABLET ORAL EVERY 4 HOURS PRN
Status: DISCONTINUED | OUTPATIENT
Start: 2024-11-04 | End: 2024-11-04 | Stop reason: HOSPADM

## 2024-11-04 RX ORDER — NITROGLYCERIN 20 MG/100ML
INJECTION INTRAVENOUS PRN
Status: DISCONTINUED | OUTPATIENT
Start: 2024-11-04 | End: 2024-11-04 | Stop reason: HOSPADM

## 2024-11-04 RX ORDER — SODIUM CHLORIDE 9 MG/ML
INJECTION, SOLUTION INTRAVENOUS CONTINUOUS
Status: DISCONTINUED | OUTPATIENT
Start: 2024-11-04 | End: 2024-11-04 | Stop reason: HOSPADM

## 2024-11-04 RX ORDER — IODIXANOL 320 MG/ML
INJECTION, SOLUTION INTRAVASCULAR PRN
Status: DISCONTINUED | OUTPATIENT
Start: 2024-11-04 | End: 2024-11-04 | Stop reason: HOSPADM

## 2024-11-04 RX ORDER — SODIUM CHLORIDE 0.9 % (FLUSH) 0.9 %
5-40 SYRINGE (ML) INJECTION PRN
Status: DISCONTINUED | OUTPATIENT
Start: 2024-11-04 | End: 2024-11-04 | Stop reason: HOSPADM

## 2024-11-04 RX ORDER — SODIUM CHLORIDE 0.9 % (FLUSH) 0.9 %
5-40 SYRINGE (ML) INJECTION EVERY 12 HOURS SCHEDULED
Status: DISCONTINUED | OUTPATIENT
Start: 2024-11-04 | End: 2024-11-04 | Stop reason: HOSPADM

## 2024-11-04 RX ORDER — ONDANSETRON 2 MG/ML
4 INJECTION INTRAMUSCULAR; INTRAVENOUS EVERY 6 HOURS PRN
Status: DISCONTINUED | OUTPATIENT
Start: 2024-11-04 | End: 2024-11-04 | Stop reason: HOSPADM

## 2024-11-04 RX ORDER — ASPIRIN 325 MG
325 TABLET ORAL ONCE
Status: COMPLETED | OUTPATIENT
Start: 2024-11-04 | End: 2024-11-04

## 2024-11-04 RX ORDER — NITROGLYCERIN 0.4 MG/1
0.4 TABLET SUBLINGUAL EVERY 5 MIN PRN
Status: DISCONTINUED | OUTPATIENT
Start: 2024-11-04 | End: 2024-11-04 | Stop reason: HOSPADM

## 2024-11-04 RX ORDER — HEPARIN SODIUM 1000 [USP'U]/ML
INJECTION, SOLUTION INTRAVENOUS; SUBCUTANEOUS PRN
Status: DISCONTINUED | OUTPATIENT
Start: 2024-11-04 | End: 2024-11-04 | Stop reason: HOSPADM

## 2024-11-04 RX ORDER — MIDAZOLAM HYDROCHLORIDE 1 MG/ML
INJECTION, SOLUTION INTRAMUSCULAR; INTRAVENOUS PRN
Status: DISCONTINUED | OUTPATIENT
Start: 2024-11-04 | End: 2024-11-04 | Stop reason: HOSPADM

## 2024-11-04 RX ADMIN — SODIUM CHLORIDE: 9 INJECTION, SOLUTION INTRAVENOUS at 08:55

## 2024-11-04 RX ADMIN — ASPIRIN 325 MG ORAL TABLET 325 MG: 325 PILL ORAL at 08:30

## 2024-11-04 NOTE — PROCEDURES
PROCEDURE NOTE  Date: 11/4/2024   Name: Sara Pardo  YOB: 1960    Procedures    Cardiac catheterization preliminary note:    Double vessel, branch disease with ostial LAD and significant ostial diagonal disease with severe proximal LAD vessel calcification, intermediate to severe proximal RCA stenosis.  Normal LV systolic function.  Porcelain aorta.    Plan: Will have CT surgery evaluate patient for revascularization options given diabetic status with ESRD but noted porcelain aorta and consideration for robotic approach.  If turndown for surgery, would consider PCI.  Continue medical management.

## 2024-11-04 NOTE — PROGRESS NOTES
Patient out of bed with RN at bedside and ambulated to bathroom and back. Patient tolerated activity without S/S bleeding noted. Resp E/U with ambulation. Pt then ambulated in cath holding halls while pushing W/C, paying close attention to not pushing with rt wrist. Pt able to ambulated to nurses station and back with walker. Spouse present. Pt then sitting up at bedside chair moving rt around slightly as instructed.

## 2024-11-04 NOTE — DISCHARGE INSTRUCTIONS
PATIENT INSTRUCTIONS- POST RADIAL CARDIAC CATH/ANGIOPLASTY      Do not subject hand/arm to any forceful movements for 24 hours (i.e. Supporting weight) when rising from a chair or bed.       For 2 days following discharge:  Do Not  Operate a motor vehicle, tractor, lawnmower, motorcycle or all-terrain vehicle.  Do Not  Lift anything heavier than 1 pound with affected arm.  Avoid  Excessive (extension/flexion) wrist movement.      Avoid heavy lifting with affected arm for 3 days following discharge.      Do Not engage in vigorous exercise (i.e. Tennis, ect.) using affected arm for 5 days following discharge.     If bleeding should occur while in the hospital, apply firm pressure to the site an call your nurse.     If bleeding should occur following discharge:  Sit down and apply firm pressure to site with your fingers x 10 mins.  If the bleeding stops, continue to sit quietly, keeping your wrist straight for 2 hours. Notify your physician as soon as possible.  If bleeding DOES NOT STOP after 10 mins or if there is a large amount of bleeding or spurting , CALL 911 immediately. DO NOT DRIVE YOURSELF TO THE HOSPITAL.     Remove bandage 24 hours following application and replace with a band aid.     You may shower on the day following your procedure. Do not take a tub bath for 3 days following discharge. Do not submerge arm in dishwater or other water sources for 5 days. How can you care for yourself at home?   Activity   Do not do strenuous exercise and do not lift, pull, or push anything heavy until your doctor says it is okay. This may be for a day or two. You can walk around the house and do light activity, such as cooking.   You may shower 24 to 48 hours after the procedure, if your doctor okays it. Pat the incision dry. Do not take a bath for 1 week, or until your doctor tells you it is okay.   If the catheter was placed in your groin, try not to walk up stairs for the first couple of days.   If the catheter was

## 2024-11-04 NOTE — CONSULTS
[facility] [place] Cardiothoracic Surgery  [facility] Medical Office Building  [address], [address], [place] [address]  Phone: [phone]  Fax: [phone]    Name: Sara Pardo  : 1960    DATE: 2024                            CARDIOTHORACIC SURGERY CONSULTATION    The patient is a 64-year-old type II diabetic who suffers from extreme morbid obesity.  She also suffers from stage V end-stage renal failure on hemodialysis at home.  She has no prior cardiac history.  She developed some mild chest discomfort that did not appear to be cardiac related but an echocardiogram did demonstrate evidence of moderate mitral valve stenosis.  Because of family history of heart disease it was recommended she undergo cardiac catheterization.  Catheterization was performed today by Dr. Nuñez.  This demonstrates left ventricle shows mild global hypokinesis with ejection fraction of 50%.  There is severe mitral annular calcification present with a \"porcelain\" aorta.  Injection of left main artery demonstrates no significant disease.  The proximal LAD has calcification with a 60 to 70% long area of stenosis.  A proximal midportion of the LAD has a moderate size diagonal branch which has an 80% stenosis.  The circumflex system has a large obtuse marginal branch which is free of disease.  The RCA is large dominant vessel with a 50% proximal stenosis.  Because of the calcification of the LAD and diagonal branch she is felt to be at higher risk for complications with PCI and stenting and therefore she was referred for  surgical revascularization..  The patient does have a prior history of deep venous thrombosis.  This is found during the time she was being worked up for gastric bypass surgery for which she is finally turned down because of insurance reasons.  She did have an inferior vena cava filter placed at that time.  She has never been a cigarette smoker.  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 Sabino Pardo    Number of children: 1    Years of education: Not on file    Highest education level: Not on file   Occupational History    Occupation: RN at [employer]     Comment: retired from that and was disabled later at a different job   Tobacco Use    Smoking status: Never     Passive exposure: Past (all growing up Dad smoked cigars and Mom smoked ciggarettes near her)    Smokeless tobacco: Never   Vaping Use    Vaping status: Never Used   Substance and Sexual Activity    Alcohol use: Never    Drug use: No    Sexual activity: Not on file     Comment: has a daughter   Other Topics Concern    Not on file   Social History Narrative    CODE STATUS: DNR    HEALTH CARE PROXY: Mr. Sabino Pardo, Her , [phone]    AMBULATES: uses a wheel chair when out, walks at home independently    DOMICILED: has stairs in her home to the den and washer which she does not use     Social Determinants of Health     Financial Resource Strain: Medium Risk (4/16/2024)    Overall Financial Resource Strain (CARDIA)     Difficulty of Paying Living Expenses: Somewhat hard   Food Insecurity: Food Insecurity Present (4/16/2024)    Hunger Vital Sign     Worried About Running Out of Food in the Last Year: Sometimes true     Ran Out of Food in the Last Year: Sometimes true   Transportation Needs: Unknown (4/16/2024)    PRAPARE - Transportation     Lack of Transportation (Medical): Not on file     Lack of Transportation (Non-Medical): No   Physical Activity: Inactive (4/16/2024)    Exercise Vital Sign     Days of Exercise per Week: 0 days     Minutes of Exercise per Session: 0 min   Stress: Not on file   Social Connections: Unknown (10/8/2023)    [facility]    Family and Community Support     Help with Day-to-Day Activities: Not on file     Lonely or  dialysis created in the left upper arm.  There is a good thrill palpable.  Abdomen: Extremely obese with a large panniculus impossible to palpate any organomegaly.  Lymphatic: No lymphadenopathy in the cervical, axillary, or femoral areas.   Musculoskeletal: Unable to examine joints as the patient is sedated lying in bed.  Skin: No rashes, lesions, or ulcers., No swelling or edema.  Neurologic exam: No lateralizing neurologic findings., Cranial nerves II-XII are normal., Examination of sensation is normal.  Psychiatric: Patient exhibits normal judgement and is oriented to name, time, and place., No anxiety or depression.    I have reviewed this patient's cardiac catheterization films in addition to recent CT scans and echocardiogram.  This is a 64-year-old female type II diabetic who suffers from extreme morbid obesity.  She has been on dialysis now for 3 years and has manifestations of this disease which includes severe mitral annular calcification moderate mitral valve stenosis and a porcelain aorta.  Her cardiac catheterization does demonstrate a moderate disease of the LAD ostium and perhaps a very tight lesion of the proximal diagonal branch.  Her symptoms are negative for angina but she has had some shortness of breath issues.  Whether or not this is related to her mitral valve stenosis is not clear.  Be that as it may traditional bypass surgery to graft the LAD and diagonal branch is not an option for her as she has a porcelain aorta which precludes any attempted proximal vein graft anastomoses or cannulation for bypass support.  1 could consider a more minimally invasive approach using the robotic takedown of left and right internal mammary arteries to graft the LAD and diagonal branch although this will be quite an undertaking and does carry high risk.  The STS risk score for bypass surgery in this patient is  8 percent mortality with a 19% major morbidity mortality.  This needs to be balanced with the risk

## 2024-11-05 ENCOUNTER — TELEPHONE (OUTPATIENT)
Dept: CARDIOTHORACIC SURGERY | Age: 64
End: 2024-11-05

## 2024-11-05 NOTE — TELEPHONE ENCOUNTER
Cancer Maternal Grandmother     Cervical Cancer Maternal Grandmother     Diabetes Maternal Grandfather     Other Maternal Grandfather         histoplasmosis    Obesity Paternal Grandmother     Diabetes Paternal Grandfather     Kidney Disease Paternal Grandfather     Other Daughter         Lidia Hoover Tabes syndrome          Current Outpatient Medications:     REPATHA SOSY syringe, Inject 3 mLs into the skin every 14 days Pt takes every other Sunday., Disp: , Rfl:     buPROPion (WELLBUTRIN SR) 100 MG extended release tablet, Take 1 tablet by mouth daily, Disp: 90 tablet, Rfl: 1    levothyroxine (SYNTHROID) 125 MCG tablet, Take 1 tablet by mouth Daily, Disp: 90 tablet, Rfl: 1    carvedilol (COREG) 3.125 MG tablet, Take 1 tablet by mouth daily, Disp: , Rfl:     traZODone (DESYREL) 100 MG tablet, Take 1 tablet by mouth nightly, Disp: 90 tablet, Rfl: 1    famotidine (PEPCID) 20 MG tablet, TAKE (1/2) TABLET BY MOUTH EVERY EVENING FOR GERD (Patient taking differently: Take 1 tablet by mouth every evening), Disp: 90 tablet, Rfl: 1    citalopram (CELEXA) 40 MG tablet, TAKE ONE TABLET BY MOUTH EVERY DAY, Disp: 90 tablet, Rfl: 1    rosuvastatin (CRESTOR) 20 MG tablet, Take 1 tablet by mouth daily, Disp: 90 tablet, Rfl: 1    fluticasone (FLONASE) 50 MCG/ACT nasal spray, 2 sprays by Nasal route daily as needed for Rhinitis, Disp: 16 g, Rfl: 3    Insulin Disposable Pump (OMNIPOD 5 G6 POD, GEN 5,) MISC, 200 Units by Other route continuous Via pump, Disp: , Rfl:     sevelamer (RENVELA) 800 MG tablet, TAKE 4 TABLETS BY MOUTH THREE TIMES DAILY WITH MEALS AND 2 TABLETS WITH A SNACK, Disp: , Rfl:     Semaglutide,0.25 or 0.5MG/DOS, (OZEMPIC, 0.25 OR 0.5 MG/DOSE,) 2 MG/1.5ML SOPN, Inject 2 mg into the skin once a week Pt takes every Sunday., Disp: , Rfl:     vitamin C (ASCORBIC ACID) 500 MG tablet, Take 1 tablet by mouth 2 times daily, Disp: , Rfl:     calcitRIOL (ROCALTROL) 0.5 MCG capsule, Take 1 capsule by mouth daily 4

## 2024-11-06 ENCOUNTER — PREP FOR PROCEDURE (OUTPATIENT)
Dept: CARDIOLOGY CLINIC | Age: 64
End: 2024-11-06

## 2024-11-06 ENCOUNTER — OFFICE VISIT (OUTPATIENT)
Dept: CARDIOTHORACIC SURGERY | Age: 64
End: 2024-11-06
Payer: MEDICARE

## 2024-11-06 ENCOUNTER — ANESTHESIA EVENT (OUTPATIENT)
Dept: OPERATING ROOM | Age: 64
End: 2024-11-06
Payer: MEDICARE

## 2024-11-06 VITALS
SYSTOLIC BLOOD PRESSURE: 175 MMHG | BODY MASS INDEX: 46.61 KG/M2 | DIASTOLIC BLOOD PRESSURE: 92 MMHG | HEART RATE: 62 BPM | OXYGEN SATURATION: 98 % | HEIGHT: 66 IN | WEIGHT: 290 LBS

## 2024-11-06 DIAGNOSIS — R94.39 POSITIVE CARDIAC STRESS TEST: Primary | ICD-10-CM

## 2024-11-06 PROBLEM — I25.10 CORONARY ARTERY DISEASE: Status: ACTIVE | Noted: 2024-11-06

## 2024-11-06 PROCEDURE — 99204 OFFICE O/P NEW MOD 45 MIN: CPT | Performed by: SURGERY

## 2024-11-06 PROCEDURE — G8484 FLU IMMUNIZE NO ADMIN: HCPCS | Performed by: SURGERY

## 2024-11-06 PROCEDURE — G8427 DOCREV CUR MEDS BY ELIG CLIN: HCPCS | Performed by: SURGERY

## 2024-11-06 PROCEDURE — 3080F DIAST BP >= 90 MM HG: CPT | Performed by: SURGERY

## 2024-11-06 PROCEDURE — G8417 CALC BMI ABV UP PARAM F/U: HCPCS | Performed by: SURGERY

## 2024-11-06 PROCEDURE — 3017F COLORECTAL CA SCREEN DOC REV: CPT | Performed by: SURGERY

## 2024-11-06 PROCEDURE — 1036F TOBACCO NON-USER: CPT | Performed by: SURGERY

## 2024-11-06 PROCEDURE — 3077F SYST BP >= 140 MM HG: CPT | Performed by: SURGERY

## 2024-11-06 NOTE — PROGRESS NOTES
Department of Cardiothoracic Surgery     CHIEF COMPLAINT:  chest discomfort           HISTORY OF PRESENT ILLNESS:       The patient is a 64 y.o. female with significant past medical history of morbid obesity, diabetes (HgA1c 6.2%) and ESRD on HD, DVT s/p IVC filter who developed chest discomfort and underwent cardiology work up. Stress test showed anterior myocardial ischemia. She underwent left heart cath showing two vessel CAD not amenable to PCI. Dr. Nuñez feels that she is not an ideal candidate for PCI. Dr. Hastings consulted yesterday on her case and asked me for a second opinion given concerns about a sternotomy and placing vein grafts onto the aorta. I asked for her to come into my office today to discuss possible CABG.     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 Pt not sure of date, states fall of 2023    CHF (congestive heart failure) ([facility])      CKD (chronic kidney disease)       stage 4    Colitis      Colon polyp      Deep vein thrombosis ([facility])      Depression      Embolism - blood clot 2004    Hemodialysis patient ([facility])       5 days a week at home, takes Sunday + one other day off each week    Hx of blood clots      Hyperlipidemia      Hypertension      Hypertension associated with diabetes ([facility]) 10/17/2016    Obesity, morbid, BMI 50 or higher      Sleep apnea       CPAP    Thyroid disease      Type II or unspecified type diabetes mellitus without mention of complication, not stated as uncontrolled              Social History               Socioeconomic History    Marital status:        Spouse name: Mr. Sabino Pardo    Number of children: 1    Years of education: Not on file    Highest education level:

## 2024-11-07 ENCOUNTER — APPOINTMENT (OUTPATIENT)
Dept: VASCULAR LAB | Age: 64
End: 2024-11-07
Attending: SURGERY
Payer: MEDICARE

## 2024-11-07 ENCOUNTER — TELEPHONE (OUTPATIENT)
Dept: CARDIOLOGY CLINIC | Age: 64
End: 2024-11-07

## 2024-11-07 ENCOUNTER — HOSPITAL ENCOUNTER (INPATIENT)
Age: 64
LOS: 5 days | Discharge: HOME | End: 2024-11-12
Attending: SURGERY | Admitting: SURGERY
Payer: MEDICARE

## 2024-11-07 DIAGNOSIS — I25.10 CORONARY ARTERY DISEASE INVOLVING NATIVE CORONARY ARTERY OF NATIVE HEART WITHOUT ANGINA PECTORIS: ICD-10-CM

## 2024-11-07 DIAGNOSIS — I25.10 CAD IN NATIVE ARTERY: Primary | ICD-10-CM

## 2024-11-07 LAB
ABO/RH: NORMAL
ANTIBODY SCREEN: NORMAL
GLUCOSE BLD-MCNC: 125 MG/DL (ref 70–99)
PERFORMED ON: ABNORMAL

## 2024-11-07 PROCEDURE — 86901 BLOOD TYPING SEROLOGIC RH(D): CPT

## 2024-11-07 PROCEDURE — 94375 RESPIRATORY FLOW VOLUME LOOP: CPT

## 2024-11-07 PROCEDURE — 86850 RBC ANTIBODY SCREEN: CPT

## 2024-11-07 PROCEDURE — 6370000000 HC RX 637 (ALT 250 FOR IP): Performed by: SURGERY

## 2024-11-07 PROCEDURE — 8010000000 HC HEMODIALYSIS ACUTE INPT

## 2024-11-07 PROCEDURE — 5A1D70Z PERFORMANCE OF URINARY FILTRATION, INTERMITTENT, LESS THAN 6 HOURS PER DAY: ICD-10-PCS | Performed by: INTERNAL MEDICINE

## 2024-11-07 PROCEDURE — 93970 EXTREMITY STUDY: CPT

## 2024-11-07 PROCEDURE — 94760 N-INVAS EAR/PLS OXIMETRY 1: CPT

## 2024-11-07 PROCEDURE — 87641 MR-STAPH DNA AMP PROBE: CPT

## 2024-11-07 PROCEDURE — 2140000000 HC CCU INTERMEDIATE R&B

## 2024-11-07 PROCEDURE — 82962 GLUCOSE BLOOD TEST: CPT

## 2024-11-07 PROCEDURE — 86900 BLOOD TYPING SEROLOGIC ABO: CPT

## 2024-11-07 PROCEDURE — 99024 POSTOP FOLLOW-UP VISIT: CPT | Performed by: SURGERY

## 2024-11-07 PROCEDURE — 36415 COLL VENOUS BLD VENIPUNCTURE: CPT

## 2024-11-07 PROCEDURE — 93880 EXTRACRANIAL BILAT STUDY: CPT

## 2024-11-07 RX ORDER — TRAZODONE HYDROCHLORIDE 100 MG/1
100 TABLET ORAL NIGHTLY
Status: DISCONTINUED | OUTPATIENT
Start: 2024-11-07 | End: 2024-11-08

## 2024-11-07 RX ORDER — SODIUM CHLORIDE 0.9 % (FLUSH) 0.9 %
10 SYRINGE (ML) INJECTION PRN
Status: DISCONTINUED | OUTPATIENT
Start: 2024-11-07 | End: 2024-11-08

## 2024-11-07 RX ORDER — SODIUM CHLORIDE 0.9 % (FLUSH) 0.9 %
10 SYRINGE (ML) INJECTION EVERY 12 HOURS SCHEDULED
Status: DISCONTINUED | OUTPATIENT
Start: 2024-11-07 | End: 2024-11-08

## 2024-11-07 RX ORDER — CHLORHEXIDINE GLUCONATE ORAL RINSE 1.2 MG/ML
15 SOLUTION DENTAL ONCE
Status: COMPLETED | OUTPATIENT
Start: 2024-11-07 | End: 2024-11-08

## 2024-11-07 RX ORDER — MUPIROCIN 20 MG/G
OINTMENT TOPICAL 2 TIMES DAILY
Status: DISCONTINUED | OUTPATIENT
Start: 2024-11-07 | End: 2024-11-08 | Stop reason: HOSPADM

## 2024-11-07 RX ORDER — CHLORHEXIDINE GLUCONATE 40 MG/ML
SOLUTION TOPICAL SEE ADMIN INSTRUCTIONS
Status: DISCONTINUED | OUTPATIENT
Start: 2024-11-07 | End: 2024-11-08 | Stop reason: HOSPADM

## 2024-11-07 RX ORDER — SODIUM CHLORIDE 9 MG/ML
INJECTION, SOLUTION INTRAVENOUS PRN
Status: DISCONTINUED | OUTPATIENT
Start: 2024-11-07 | End: 2024-11-08

## 2024-11-07 RX ADMIN — TRAZODONE HYDROCHLORIDE 100 MG: 100 TABLET ORAL at 22:40

## 2024-11-07 RX ADMIN — MUPIROCIN: 20 OINTMENT TOPICAL at 22:40

## 2024-11-07 SDOH — ECONOMIC STABILITY: INCOME INSECURITY: IN THE PAST 12 MONTHS, HAS THE ELECTRIC, GAS, OIL, OR WATER COMPANY THREATENED TO SHUT OFF SERVICE IN YOUR HOME?: NO

## 2024-11-07 SDOH — ECONOMIC STABILITY: FOOD INSECURITY: WITHIN THE PAST 12 MONTHS, YOU WORRIED THAT YOUR FOOD WOULD RUN OUT BEFORE YOU GOT MONEY TO BUY MORE.: SOMETIMES TRUE

## 2024-11-07 SDOH — ECONOMIC STABILITY: INCOME INSECURITY: HOW HARD IS IT FOR YOU TO PAY FOR THE VERY BASICS LIKE FOOD, HOUSING, MEDICAL CARE, AND HEATING?: SOMEWHAT HARD

## 2024-11-07 ASSESSMENT — PATIENT HEALTH QUESTIONNAIRE - PHQ9
1. LITTLE INTEREST OR PLEASURE IN DOING THINGS: SEVERAL DAYS
SUM OF ALL RESPONSES TO PHQ QUESTIONS 1-9: 1
2. FEELING DOWN, DEPRESSED OR HOPELESS: NOT AT ALL
SUM OF ALL RESPONSES TO PHQ9 QUESTIONS 1 & 2: 1

## 2024-11-07 NOTE — TELEPHONE ENCOUNTER
Received a clearance request from [facility] for patient to have a cardiac clearance to have dental treatment completed.  Called and let them know that patient is going to be undergoing open heart surgery tomorrow and it would be  bit before she would be cleared.  She advised that she would let the provider know.

## 2024-11-07 NOTE — PROGRESS NOTES
Spiritual Health History and Assessment/Progress Note  [facility]    Initial Encounter,  ,  ,      Name: Sara Pardo MRN: [id number]    Age: 64 y.o.     Sex: female   Language: English   Bahai: Zoroastrian   CAD in native artery     Date: 11/7/2024            Total Time Calculated: (P) 60 min              Spiritual Assessment began in [facility] 7 PROGRESSIVE CARE        Referral/Consult From: (P) Nursing Supervisor/Manager   Encounter Overview/Reason: Initial Encounter  Service Provided For: Patient (in PCU)    Nat, Belief, Meaning:   Patient identifies as spiritual, is connected with a nat tradition or spiritual practice, and has beliefs or practices that help with coping during difficult times  Family/Friends No family/friends present      Importance and Influence:  Patient has spiritual/personal beliefs that influence decisions regarding their health  Family/Friends No family/friends present    Community:  Patient is connected with a spiritual community and feels well-supported. Support system includes: Spouse/Partner, Nat Community, and Extended family  Family/Friends No family/friends present    Assessment and Plan of Care:     Patient Interventions include: Facilitated expression of thoughts and feelings, Explored spiritual coping/struggle/distress, and Affirmed coping skills/support systems  Family/Friends Interventions include: No family/friends present    Patient Plan of Care: Spiritual Care available upon further referral  Family/Friends Plan of Care: No family/friends present    Electronically signed by Chaplain Caballero Mai on 11/7/2024 at 5:54 PM    11/07/24 1740   Encounter Summary   Encounter Overview/Reason Initial Encounter   Encounter Code  Assessment by  services   Service Provided For Patient  (in PCU)   Referral/Consult From Nursing Supervisor/Manager   Support System Spouse;Family members   Complexity of Encounter Low   Begin Time 1630   End Time  1730   Total Time

## 2024-11-07 NOTE — H&P
Work/School: no      Feels Threatened by Someone: no      Does Anyone Try to Keep You From Having Contact with Others or Doing Things Outside Your Home?: no      Physical Signs of Abuse Present: no   Housing Stability: Unknown (5/29/2024)     [facility]     Housing Stability      Current Living Arrangements: Not on file      Potentially Unsafe Housing Conditions: Not on file            Past Surgical History             Past Surgical History:   Procedure Laterality Date    CHOLECYSTECTOMY, LAPAROSCOPIC   1986    DIALYSIS CATHETER INSERTION N/A 08/11/2021     INSERTION CATHETER DIALYSIS performed by Dick Glaser MD at [facility] OR    DIALYSIS FISTULA CREATION Left 06/18/2021     LEFT BRACHIAL-CEPHALIC AV FISTULA CREATION performed by Dick Glaser MD at [facility] OR    DIALYSIS FISTULA CREATION Left 07/27/2021     LEFT UPPER EXTREMITY CEPHALIC VEIN SUPERFICIALIZATION performed by Dick Glaser MD at [facility] OR    DILATION AND CURETTAGE OF UTERUS   2005    FISTULAGRAM Left 09/30/2022     LEFT UPPER EXTREMITY AV FISTULAGRAM, POSSIBLE BALLOON ANGIOPLASTY, STENT POSS REVISION performed by Dick Glaser MD at [facility] OR    HYSTERECTOMY (CERVIX STATUS UNKNOWN)   10/17/2018    IR IVC FILTER PLACEMENT W IMAGING        POLYPECTOMY         ~2012 had colon polyps, 2022 had a benign polyp and 2 possibly cancerous polyps, DUE FOR REPEAT IN DEC 2022    RECTAL SURGERY   1981     fistula repair    TONSILLECTOMY AND ADENOIDECTOMY         at age 16 years    VASCULAR SURGERY   07/12/2021     SJS- Ultrasound-guided cannulation left distal upper arm cephalic vein with 4 [place]an glide sheath, Left upper extremity fistulogram's including venography the superior vena cava    VASCULAR SURGERY   07/27/2021     SJS- Superficialization of the left upper arm cephalic vein arteriovenous fistula            Allergies             Allergies   Allergen Reactions    Codeine Nausea And Vomiting and Other  coronary revascularization in order to prolong survival and avoid future cardiac events. The lesions are not ideal for PCI given the severe calcification and long lesion in the LAD. The patient is a not a good candidate for sternotomy CABG given obesity (BMI 48) and other comorbidities. She is acceptable for a robotic approach using BIMA to graft the LAD and diag off pump. The morbid obesity makes the procedure a challenge, but the risk of wound infection andor dehiscence in someone her size is far less than after a full sternotomy.The IVC filter makes it difficult, but not impossible, to bail out to CPB support during an attempt at diag grafting off pump.  The patient has been ineffectively dialyzed lately and therefore requires inpatient hospitalization prior to the proposed robotic CABG x 2 in order to undergo inpateint HD. In addition, we will initiate a preop w/u and calculate an STS risk score.     Niko Morataya MD

## 2024-11-07 NOTE — CONSULTS
Renal Consult Note    Thank you to requesting provider: Dr Morataya, for asking us to see Sara Pardo    Reason for consultation: ESRD management    Chief Complaint: Coronary artery disease    History of Presenting Illness      Patient is a 64-year-old pleasant woman with a past medical history of morbid obesity, hypertension, type 2 diabetes, history of DVT status post IVC filter, and coronary artery disease.  She was given a murmur and underwent cardiac workup.  She was found with two-vessel disease not amenable to PCI.  Patient was seen by CT surgeon and non invasive CABG technique was offered.  Renal service was consulted to manage her ESRD.  Patient has last received home hemodialysis on November 5.  She offers no other concerns or complaints.  She denies a chest pain.  Patient was seen and examined during dialysis.    Dialysis   Pt was seen on RRT  Modality: Hemodialysis  Access: Arterial Venous Fistula  Location: left upper  QB: 450  QD: 700  UF: 3 liters     Past Medical/Surgical History      Active Ambulatory Problems     Diagnosis Date Noted    Morbid obesity 09/15/2011    GERI on CPAP 02/26/2017    Moderate major depression (HCC) 02/26/2017    Hypertriglyceridemia 02/26/2017    Anxiety 02/26/2017    Vitamin D deficiency 02/26/2017    Hypocalcemia 02/26/2017    Allergic rhinitis with postnasal drip 04/06/2021    B12 deficiency 10/17/2016    History of DVT (deep vein thrombosis) 10/09/2019    ESRD on peritoneal dialysis (HCC) 07/24/2018    Hypertension 08/10/2021    Pyelonephritis 06/25/2022    Colitis 10/22/2022    Great toe pain, left 01/11/2023    Anemia in end-stage renal disease (HCC) 02/09/2023    Extreme obesity 03/30/2023    Non-restorative sleep 03/30/2023    Right trigeminal neuralgia 04/06/2023    Acquired hypothyroidism 02/06/2017    Anaphylactic shock, unspecified, initial encounter 08/13/2021    Chest pain, unspecified 08/13/2021    Gastric polyps 04/11/2023    Hyperkalemia 08/13/2021

## 2024-11-07 NOTE — PROGRESS NOTES
Vascular preliminary report.  Carotid artery duplex scan completed.  Bilateral ICA < 50% stenosis.   Bilateral Vertebral arteries antegrade flow seen.  Final Report pending.

## 2024-11-07 NOTE — PLAN OF CARE
Problem: Chronic Conditions and Co-morbidities  Goal: Patient's chronic conditions and co-morbidity symptoms are monitored and maintained or improved  Outcome: Progressing     Problem: Discharge Planning  Goal: Discharge to home or other facility with appropriate resources  Outcome: Progressing  Flowsheets (Taken 11/7/2024 2994)  Discharge to home or other facility with appropriate resources:   Identify barriers to discharge with patient and caregiver   Identify discharge learning needs (meds, wound care, etc)   Refer to discharge planning if patient needs post-hospital services based on physician order or complex needs related to functional status, cognitive ability or social support system   Arrange for needed discharge resources and transportation as appropriate   Arrange for interpreters to assist at discharge as needed     Problem: Spiritual Struggle  Goal: Verbalizes spiritual struggle  Outcome: Progressing  Goal: Gains new understanding/perception  Outcome: Progressing  Goal: Growing sense of peace/hope  Outcome: Progressing     Problem: Spiritual Struggle  Goal: Gains new understanding/perception  Outcome: Progressing

## 2024-11-08 ENCOUNTER — HOSPITAL ENCOUNTER (OUTPATIENT)
Age: 64
Discharge: HOME | End: 2024-11-10
Attending: SURGERY

## 2024-11-08 ENCOUNTER — ANESTHESIA (OUTPATIENT)
Dept: OPERATING ROOM | Age: 64
End: 2024-11-08
Payer: MEDICARE

## 2024-11-08 ENCOUNTER — APPOINTMENT (OUTPATIENT)
Dept: GENERAL RADIOLOGY | Age: 64
End: 2024-11-08
Attending: SURGERY
Payer: MEDICARE

## 2024-11-08 LAB
ALLENS TEST: ABNORMAL
ALLENS TEST: ABNORMAL
ANION GAP BLD CALC-SCNC: 11 MMOL/L
ANION GAP BLD CALC-SCNC: 13 MMOL/L
ANION GAP BLD CALC-SCNC: 13 MMOL/L
ANION GAP SERPL CALC-SCNC: 100 MEQ/L (ref 99–110)
ANION GAP SERPL CALC-SCNC: 103 MEQ/L (ref 99–110)
ANION GAP SERPL CALC-SCNC: 98 MEQ/L (ref 99–110)
ANION GAP SERPL CALCULATED.3IONS-SCNC: 18 MMOL/L (ref 7–19)
APTT PPP: 29.6 SEC (ref 26–36.2)
BASE EXCESS ARTERIAL: -1 (ref -3–3)
BASE EXCESS ARTERIAL: -2 (ref -3–3)
BASE EXCESS ARTERIAL: -3.8 MMOL/L (ref -2–2)
BASE EXCESS ARTERIAL: 0.9 MMOL/L (ref -2–2)
BASE EXCESS ARTERIAL: 1 (ref -3–3)
BUN SERPL-MCNC: 46 MG/DL (ref 8–23)
CA-I BLD-SCNC: 1.1 MMOL/L (ref 1.1–1.3)
CA-I BLD-SCNC: 1.14 MMOL/L (ref 1.1–1.3)
CA-I BLD-SCNC: 1.15 MMOL/L (ref 1.1–1.3)
CALCIUM SERPL-MCNC: 9.1 MG/DL (ref 8.8–10.2)
CARBOXYHEMOGLOBIN ARTERIAL: 1.6 % (ref 0–5)
CARBOXYHEMOGLOBIN ARTERIAL: 1.8 % (ref 0–5)
CHLORIDE SERPL-SCNC: 96 MMOL/L (ref 98–111)
CO2 BLD CALC-SCNC: 22 MEQ/L (ref 21–32)
CO2 BLD CALC-SCNC: 22 MEQ/L (ref 21–32)
CO2 BLD CALC-SCNC: 24 MEQ/L (ref 21–32)
CO2 SERPL-SCNC: 23 MMOL/L (ref 22–29)
CREAT SERPL-MCNC: 7.1 MG/DL (ref 0.3–1.3)
CREAT SERPL-MCNC: 7.5 MG/DL (ref 0.3–1.3)
CREAT SERPL-MCNC: 7.5 MG/DL (ref 0.3–1.3)
CREAT SERPL-MCNC: 8.8 MG/DL (ref 0.5–0.9)
ERYTHROCYTE [DISTWIDTH] IN BLOOD BY AUTOMATED COUNT: 15.9 % (ref 11.5–14.5)
ERYTHROCYTE [DISTWIDTH] IN BLOOD BY AUTOMATED COUNT: 15.9 % (ref 11.5–14.5)
FIO2: 100 %
FIO2: 40 %
GLUCOSE BLD-MCNC: 126 MG/DL (ref 70–99)
GLUCOSE BLD-MCNC: 130 MG/DL (ref 70–99)
GLUCOSE BLD-MCNC: 143 MG/DL (ref 70–99)
GLUCOSE BLD-MCNC: 160 MG/DL (ref 70–99)
GLUCOSE BLD-MCNC: 166 MG/DL (ref 70–99)
GLUCOSE BLD-MCNC: 174 MG/DL (ref 70–99)
GLUCOSE BLD-MCNC: 195 MG/DL (ref 70–99)
GLUCOSE BLD-MCNC: 199 MG/DL (ref 70–99)
GLUCOSE BLD-MCNC: 205 MG/DL (ref 70–99)
GLUCOSE BLD-MCNC: 212 MG/DL (ref 70–99)
GLUCOSE BLD-MCNC: 235 MG/DL (ref 70–99)
GLUCOSE BLD-MCNC: 235 MG/DL (ref 70–99)
GLUCOSE BLD-MCNC: 241 MG/DL (ref 70–99)
GLUCOSE SERPL-MCNC: 176 MG/DL (ref 70–99)
HBA1C MFR BLD: 6.4 % (ref 4–5.6)
HCO3 ARTERIAL: 22.2 MMOL/L (ref 22–26)
HCO3 ARTERIAL: 25.2 MMOL/L (ref 22–26)
HCT VFR BLD AUTO: 34 % (ref 37–52)
HCT VFR BLD AUTO: 35 % (ref 37–52)
HCT VFR BLD AUTO: 35 % (ref 37–52)
HCT VFR BLD AUTO: 36.1 % (ref 37–47)
HCT VFR BLD AUTO: 38.5 % (ref 37–47)
HEMOGLOBIN, ART, EXTENDED: 11.4 G/DL (ref 12–16)
HEMOGLOBIN, ART, EXTENDED: 11.8 G/DL (ref 12–16)
HGB BLD CALC-MCNC: 11.4 GM/DL (ref 12–18)
HGB BLD CALC-MCNC: 11.8 GM/DL (ref 12–18)
HGB BLD CALC-MCNC: 11.9 GM/DL (ref 12–18)
HGB BLD-MCNC: 11.2 G/DL (ref 12–16)
HGB BLD-MCNC: 11.7 G/DL (ref 12–16)
INR PPP: 1.22 (ref 0.88–1.18)
MAGNESIUM SERPL-MCNC: 2.2 MG/DL (ref 1.6–2.4)
MCH RBC QN AUTO: 31.5 PG (ref 27–31)
MCH RBC QN AUTO: 32.2 PG (ref 27–31)
MCHC RBC AUTO-ENTMCNC: 30.4 G/DL (ref 33–37)
MCHC RBC AUTO-ENTMCNC: 31 G/DL (ref 33–37)
MCV RBC AUTO: 103.5 FL (ref 81–99)
MCV RBC AUTO: 103.7 FL (ref 81–99)
MECHANICAL RATE IN BPM: 14
METHEMOGLOBIN ARTERIAL: 0.8 %
METHEMOGLOBIN ARTERIAL: 1 %
MODE: ABNORMAL
MODE: ABNORMAL
MRSA DNA SPEC QL NAA+PROBE: NOT DETECTED
O2 CONTENT ARTERIAL: 15.9 ML/DL
O2 CONTENT ARTERIAL: 16.2 ML/DL
O2 SAT, ARTERIAL: 100 % (ref 93–100)
O2 SAT, ARTERIAL: 100 % (ref 93–100)
O2 SAT, ARTERIAL: 95.5 %
O2 SAT, ARTERIAL: 96.9 %
O2 SAT, ARTERIAL: 98 % (ref 93–100)
O2 THERAPY: ABNORMAL
O2 THERAPY: ABNORMAL
PCO2 ARTERIAL: 32 MM HG (ref 35–48)
PCO2 ARTERIAL: 33 MM HG (ref 35–48)
PCO2 ARTERIAL: 36 MM HG (ref 35–48)
PCO2 ARTERIAL: 38 MMHG (ref 35–45)
PCO2 ARTERIAL: 43 MMHG (ref 35–45)
PERFORMED ON: ABNORMAL
PH ARTERIAL: 7.32 (ref 7.35–7.45)
PH ARTERIAL: 7.4 (ref 7.3–7.5)
PH ARTERIAL: 7.43 (ref 7.35–7.45)
PH ARTERIAL: 7.46 (ref 7.3–7.5)
PH ARTERIAL: 7.48 (ref 7.3–7.5)
PLATELET # BLD AUTO: 212 K/UL (ref 130–400)
PLATELET # BLD AUTO: 221 K/UL (ref 130–400)
PMV BLD AUTO: 9.5 FL (ref 9.4–12.3)
PMV BLD AUTO: 9.6 FL (ref 9.4–12.3)
PO2 ARTERIAL: 255 MMHG (ref 80–100)
PO2 ARTERIAL: 313 MM HG (ref 83–108)
PO2 ARTERIAL: 326 MM HG (ref 83–108)
PO2 ARTERIAL: 90 MMHG (ref 80–100)
PO2 ARTERIAL: 96 MM HG (ref 83–108)
POSITIVE END EXP PRESS: 5
POSITIVE END EXP PRESS: 5
POTASSIUM BLD-SCNC: 4.3 MMOL/L
POTASSIUM BLD-SCNC: 4.6 MMOL/L
POTASSIUM SERPL-SCNC: 4 MEQ/L (ref 3.5–5.1)
POTASSIUM SERPL-SCNC: 4.2 MEQ/L (ref 3.5–5.1)
POTASSIUM SERPL-SCNC: 4.3 MEQ/L (ref 3.5–5.1)
POTASSIUM SERPL-SCNC: 4.5 MMOL/L (ref 3.5–5)
POTASSIUM SERPL-SCNC: 4.9 MMOL/L (ref 3.5–5)
PROTHROMBIN TIME: 15.1 SEC (ref 12–14.6)
RBC # BLD AUTO: 3.48 M/UL (ref 4.2–5.4)
RBC # BLD AUTO: 3.72 M/UL (ref 4.2–5.4)
SAMPLE SOURCE: ABNORMAL
SAMPLE SOURCE: ABNORMAL
SODIUM BLD-SCNC: 133 MEQ/L (ref 136–145)
SODIUM BLD-SCNC: 135 MEQ/L (ref 136–145)
SODIUM BLD-SCNC: 138 MEQ/L (ref 136–145)
SODIUM SERPL-SCNC: 137 MMOL/L (ref 136–145)
TCO2 ARTERIAL: 23 MMOL/L
TCO2 ARTERIAL: 23 MMOL/L
TCO2 ARTERIAL: 25 MMOL/L
VT MECHANICAL: 440 %
WBC # BLD AUTO: 14.4 K/UL (ref 4.8–10.8)
WBC # BLD AUTO: 14.9 K/UL (ref 4.8–10.8)

## 2024-11-08 PROCEDURE — 02104A9 BYPASS CORONARY ARTERY, ONE ARTERY FROM LEFT INTERNAL MAMMARY WITH AUTOLOGOUS ARTERIAL TISSUE, PERCUTANEOUS ENDOSCOPIC APPROACH: ICD-10-PCS | Performed by: SURGERY

## 2024-11-08 PROCEDURE — C1894 INTRO/SHEATH, NON-LASER: HCPCS | Performed by: SURGERY

## 2024-11-08 PROCEDURE — 03BY4ZZ EXCISION OF UPPER ARTERY, PERCUTANEOUS ENDOSCOPIC APPROACH: ICD-10-PCS | Performed by: SURGERY

## 2024-11-08 PROCEDURE — 5A09357 ASSISTANCE WITH RESPIRATORY VENTILATION, LESS THAN 24 CONSECUTIVE HOURS, CONTINUOUS POSITIVE AIRWAY PRESSURE: ICD-10-PCS | Performed by: SURGERY

## 2024-11-08 PROCEDURE — 5A1935Z RESPIRATORY VENTILATION, LESS THAN 24 CONSECUTIVE HOURS: ICD-10-PCS | Performed by: SURGERY

## 2024-11-08 PROCEDURE — 94002 VENT MGMT INPAT INIT DAY: CPT

## 2024-11-08 PROCEDURE — 82947 ASSAY GLUCOSE BLOOD QUANT: CPT

## 2024-11-08 PROCEDURE — 85014 HEMATOCRIT: CPT

## 2024-11-08 PROCEDURE — 2580000003 HC RX 258: Performed by: ANESTHESIOLOGY

## 2024-11-08 PROCEDURE — 32551 INSERTION OF CHEST TUBE: CPT

## 2024-11-08 PROCEDURE — S2900 ROBOTIC SURGICAL SYSTEM: HCPCS | Performed by: SURGERY

## 2024-11-08 PROCEDURE — 2700000000 HC OXYGEN THERAPY PER DAY

## 2024-11-08 PROCEDURE — 84132 ASSAY OF SERUM POTASSIUM: CPT

## 2024-11-08 PROCEDURE — 85530 HEPARIN-PROTAMINE TOLERANCE: CPT | Performed by: SURGERY

## 2024-11-08 PROCEDURE — 3700000001 HC ADD 15 MINUTES (ANESTHESIA): Performed by: SURGERY

## 2024-11-08 PROCEDURE — 2500000003 HC RX 250 WO HCPCS: Performed by: SURGERY

## 2024-11-08 PROCEDURE — 02104A8 BYPASS CORONARY ARTERY, ONE ARTERY FROM RIGHT INTERNAL MAMMARY WITH AUTOLOGOUS ARTERIAL TISSUE, PERCUTANEOUS ENDOSCOPIC APPROACH: ICD-10-PCS | Performed by: SURGERY

## 2024-11-08 PROCEDURE — 82435 ASSAY OF BLOOD CHLORIDE: CPT

## 2024-11-08 PROCEDURE — 6370000000 HC RX 637 (ALT 250 FOR IP): Performed by: NURSE ANESTHETIST, CERTIFIED REGISTERED

## 2024-11-08 PROCEDURE — C1751 CATH, INF, PER/CENT/MIDLINE: HCPCS | Performed by: SURGERY

## 2024-11-08 PROCEDURE — 2709999900 HC NON-CHARGEABLE SUPPLY: Performed by: SURGERY

## 2024-11-08 PROCEDURE — 85347 COAGULATION TIME ACTIVATED: CPT | Performed by: SURGERY

## 2024-11-08 PROCEDURE — C1729 CATH, DRAINAGE: HCPCS | Performed by: SURGERY

## 2024-11-08 PROCEDURE — C1889 IMPLANT/INSERT DEVICE, NOC: HCPCS | Performed by: SURGERY

## 2024-11-08 PROCEDURE — 6370000000 HC RX 637 (ALT 250 FOR IP): Performed by: SURGERY

## 2024-11-08 PROCEDURE — 85610 PROTHROMBIN TIME: CPT

## 2024-11-08 PROCEDURE — 3600000091 HC PERFUSION PUMP OFF: Performed by: SURGERY

## 2024-11-08 PROCEDURE — 83036 HEMOGLOBIN GLYCOSYLATED A1C: CPT

## 2024-11-08 PROCEDURE — 2500000003 HC RX 250 WO HCPCS: Performed by: ANESTHESIOLOGY

## 2024-11-08 PROCEDURE — 84295 ASSAY OF SERUM SODIUM: CPT

## 2024-11-08 PROCEDURE — 82330 ASSAY OF CALCIUM: CPT

## 2024-11-08 PROCEDURE — C9290 INJ, BUPIVACAINE LIPOSOME: HCPCS | Performed by: ANESTHESIOLOGY

## 2024-11-08 PROCEDURE — 76942 ECHO GUIDE FOR BIOPSY: CPT | Performed by: NURSE ANESTHETIST, CERTIFIED REGISTERED

## 2024-11-08 PROCEDURE — 82803 BLOOD GASES ANY COMBINATION: CPT

## 2024-11-08 PROCEDURE — 36620 INSERTION CATHETER ARTERY: CPT

## 2024-11-08 PROCEDURE — 93503 INSERT/PLACE HEART CATHETER: CPT

## 2024-11-08 PROCEDURE — 2720000010 HC SURG SUPPLY STERILE: Performed by: SURGERY

## 2024-11-08 PROCEDURE — 6360000002 HC RX W HCPCS: Performed by: NURSE ANESTHETIST, CERTIFIED REGISTERED

## 2024-11-08 PROCEDURE — 85027 COMPLETE CBC AUTOMATED: CPT

## 2024-11-08 PROCEDURE — 6360000002 HC RX W HCPCS: Performed by: SURGERY

## 2024-11-08 PROCEDURE — 82800 BLOOD PH: CPT

## 2024-11-08 PROCEDURE — 94660 CPAP INITIATION&MGMT: CPT

## 2024-11-08 PROCEDURE — 3600000019 HC SURGERY ROBOT ADDTL 15MIN: Performed by: SURGERY

## 2024-11-08 PROCEDURE — 2580000003 HC RX 258: Performed by: SURGERY

## 2024-11-08 PROCEDURE — 2000000000 HC ICU R&B

## 2024-11-08 PROCEDURE — 2580000003 HC RX 258: Performed by: NURSE ANESTHETIST, CERTIFIED REGISTERED

## 2024-11-08 PROCEDURE — A4217 STERILE WATER/SALINE, 500 ML: HCPCS | Performed by: SURGERY

## 2024-11-08 PROCEDURE — 37799 UNLISTED PX VASCULAR SURGERY: CPT

## 2024-11-08 PROCEDURE — B246ZZ4 ULTRASONOGRAPHY OF RIGHT AND LEFT HEART, TRANSESOPHAGEAL: ICD-10-PCS | Performed by: ANESTHESIOLOGY

## 2024-11-08 PROCEDURE — 82962 GLUCOSE BLOOD TEST: CPT

## 2024-11-08 PROCEDURE — 82374 ASSAY BLOOD CARBON DIOXIDE: CPT

## 2024-11-08 PROCEDURE — 6360000002 HC RX W HCPCS: Performed by: ANESTHESIOLOGY

## 2024-11-08 PROCEDURE — 80048 BASIC METABOLIC PNL TOTAL CA: CPT

## 2024-11-08 PROCEDURE — 2500000003 HC RX 250 WO HCPCS: Performed by: NURSE ANESTHETIST, CERTIFIED REGISTERED

## 2024-11-08 PROCEDURE — 8E0W4CZ ROBOTIC ASSISTED PROCEDURE OF TRUNK REGION, PERCUTANEOUS ENDOSCOPIC APPROACH: ICD-10-PCS | Performed by: SURGERY

## 2024-11-08 PROCEDURE — 83735 ASSAY OF MAGNESIUM: CPT

## 2024-11-08 PROCEDURE — 3700000000 HC ANESTHESIA ATTENDED CARE: Performed by: SURGERY

## 2024-11-08 PROCEDURE — 3600000009 HC SURGERY ROBOT BASE: Performed by: SURGERY

## 2024-11-08 PROCEDURE — 82565 ASSAY OF CREATININE: CPT

## 2024-11-08 PROCEDURE — 0BH17EZ INSERTION OF ENDOTRACHEAL AIRWAY INTO TRACHEA, VIA NATURAL OR ARTIFICIAL OPENING: ICD-10-PCS | Performed by: SURGERY

## 2024-11-08 PROCEDURE — C1713 ANCHOR/SCREW BN/BN,TIS/BN: HCPCS | Performed by: SURGERY

## 2024-11-08 PROCEDURE — 71045 X-RAY EXAM CHEST 1 VIEW: CPT

## 2024-11-08 PROCEDURE — 82810 BLOOD GASES O2 SAT ONLY: CPT

## 2024-11-08 PROCEDURE — 3600000090 HC PERFUSION PUMP ON: Performed by: SURGERY

## 2024-11-08 PROCEDURE — L8612 AQUEOUS SHUNT PROSTHESIS: HCPCS | Performed by: SURGERY

## 2024-11-08 PROCEDURE — 85730 THROMBOPLASTIN TIME PARTIAL: CPT

## 2024-11-08 PROCEDURE — 6370000000 HC RX 637 (ALT 250 FOR IP): Performed by: ANESTHESIOLOGY

## 2024-11-08 PROCEDURE — 02HV33Z INSERTION OF INFUSION DEVICE INTO SUPERIOR VENA CAVA, PERCUTANEOUS APPROACH: ICD-10-PCS | Performed by: ANESTHESIOLOGY

## 2024-11-08 PROCEDURE — 33534 CABG ARTERIAL TWO: CPT | Performed by: SURGERY

## 2024-11-08 DEVICE — IMPLANTABLE DEVICE: Type: IMPLANTABLE DEVICE | Site: CHEST  WALL | Status: FUNCTIONAL

## 2024-11-08 DEVICE — CLIP INT SM TI EZ LD LIG SYS WECK HORIZON: Type: IMPLANTABLE DEVICE | Site: CHEST  WALL | Status: FUNCTIONAL

## 2024-11-08 DEVICE — CLIP INT L POLYMER LOK LIG HEM O LOK (6EA/PK): Type: IMPLANTABLE DEVICE | Site: CHEST  WALL | Status: FUNCTIONAL

## 2024-11-08 DEVICE — PLATE BNE 4 H STR STERNALOCK BLU PRI CLSR SYS: Type: IMPLANTABLE DEVICE | Site: CHEST  WALL | Status: FUNCTIONAL

## 2024-11-08 RX ORDER — SODIUM CHLORIDE 0.9 % (FLUSH) 0.9 %
5-40 SYRINGE (ML) INJECTION PRN
Status: DISCONTINUED | OUTPATIENT
Start: 2024-11-08 | End: 2024-11-08 | Stop reason: HOSPADM

## 2024-11-08 RX ORDER — CEFAZOLIN SODIUM 1 G/3ML
INJECTION, POWDER, FOR SOLUTION INTRAMUSCULAR; INTRAVENOUS
Status: DISCONTINUED | OUTPATIENT
Start: 2024-11-08 | End: 2024-11-08 | Stop reason: SDUPTHER

## 2024-11-08 RX ORDER — SODIUM CHLORIDE, SODIUM LACTATE, POTASSIUM CHLORIDE, CALCIUM CHLORIDE 600; 310; 30; 20 MG/100ML; MG/100ML; MG/100ML; MG/100ML
INJECTION, SOLUTION INTRAVENOUS CONTINUOUS
Status: DISCONTINUED | OUTPATIENT
Start: 2024-11-08 | End: 2024-11-08

## 2024-11-08 RX ORDER — SODIUM CHLORIDE 9 MG/ML
INJECTION, SOLUTION INTRAVENOUS PRN
Status: DISCONTINUED | OUTPATIENT
Start: 2024-11-08 | End: 2024-11-12 | Stop reason: HOSPADM

## 2024-11-08 RX ORDER — SODIUM CHLORIDE 0.9 % (FLUSH) 0.9 %
5-40 SYRINGE (ML) INJECTION EVERY 12 HOURS SCHEDULED
Status: DISCONTINUED | OUTPATIENT
Start: 2024-11-08 | End: 2024-11-12 | Stop reason: HOSPADM

## 2024-11-08 RX ORDER — LEVOTHYROXINE SODIUM 125 UG/1
125 TABLET ORAL DAILY
Status: DISCONTINUED | OUTPATIENT
Start: 2024-11-08 | End: 2024-11-12 | Stop reason: HOSPADM

## 2024-11-08 RX ORDER — DEXMEDETOMIDINE HYDROCHLORIDE 4 UG/ML
.1-1.5 INJECTION, SOLUTION INTRAVENOUS CONTINUOUS
Status: DISCONTINUED | OUTPATIENT
Start: 2024-11-08 | End: 2024-11-10

## 2024-11-08 RX ORDER — CLOPIDOGREL BISULFATE 75 MG/1
75 TABLET ORAL DAILY
Status: DISCONTINUED | OUTPATIENT
Start: 2024-11-09 | End: 2024-11-12 | Stop reason: HOSPADM

## 2024-11-08 RX ORDER — FENTANYL CITRATE 50 UG/ML
INJECTION, SOLUTION INTRAMUSCULAR; INTRAVENOUS
Status: DISCONTINUED | OUTPATIENT
Start: 2024-11-08 | End: 2024-11-08 | Stop reason: SDUPTHER

## 2024-11-08 RX ORDER — HEPARIN SODIUM 1000 [USP'U]/ML
INJECTION, SOLUTION INTRAVENOUS; SUBCUTANEOUS
Status: DISCONTINUED | OUTPATIENT
Start: 2024-11-08 | End: 2024-11-08 | Stop reason: SDUPTHER

## 2024-11-08 RX ORDER — SODIUM CHLORIDE 9 MG/ML
INJECTION, SOLUTION INTRAVENOUS PRN
Status: DISCONTINUED | OUTPATIENT
Start: 2024-11-08 | End: 2024-11-08 | Stop reason: HOSPADM

## 2024-11-08 RX ORDER — ONDANSETRON 4 MG/1
4 TABLET, ORALLY DISINTEGRATING ORAL EVERY 8 HOURS PRN
Status: DISCONTINUED | OUTPATIENT
Start: 2024-11-08 | End: 2024-11-12 | Stop reason: HOSPADM

## 2024-11-08 RX ORDER — LIDOCAINE HYDROCHLORIDE 10 MG/ML
INJECTION, SOLUTION INFILTRATION; PERINEURAL
Status: COMPLETED | OUTPATIENT
Start: 2024-11-08 | End: 2024-11-08

## 2024-11-08 RX ORDER — CALCITRIOL 0.25 UG/1
0.5 CAPSULE, LIQUID FILLED ORAL DAILY
Status: DISCONTINUED | OUTPATIENT
Start: 2024-11-08 | End: 2024-11-12 | Stop reason: HOSPADM

## 2024-11-08 RX ORDER — MORPHINE SULFATE 4 MG/ML
4 INJECTION, SOLUTION INTRAMUSCULAR; INTRAVENOUS
Status: DISCONTINUED | OUTPATIENT
Start: 2024-11-08 | End: 2024-11-12 | Stop reason: HOSPADM

## 2024-11-08 RX ORDER — ATORVASTATIN CALCIUM 20 MG/1
20 TABLET, FILM COATED ORAL NIGHTLY
Status: DISCONTINUED | OUTPATIENT
Start: 2024-11-09 | End: 2024-11-12 | Stop reason: HOSPADM

## 2024-11-08 RX ORDER — DEXTROSE MONOHYDRATE 100 MG/ML
INJECTION, SOLUTION INTRAVENOUS CONTINUOUS PRN
Status: DISCONTINUED | OUTPATIENT
Start: 2024-11-08 | End: 2024-11-09 | Stop reason: SDUPTHER

## 2024-11-08 RX ORDER — BUPROPION HYDROCHLORIDE 100 MG/1
100 TABLET, EXTENDED RELEASE ORAL DAILY
Status: DISCONTINUED | OUTPATIENT
Start: 2024-11-08 | End: 2024-11-12 | Stop reason: HOSPADM

## 2024-11-08 RX ORDER — PROTAMINE SULFATE 10 MG/ML
50 INJECTION, SOLUTION INTRAVENOUS
Status: ACTIVE | OUTPATIENT
Start: 2024-11-08 | End: 2024-11-09

## 2024-11-08 RX ORDER — SODIUM CHLORIDE 0.9 % (FLUSH) 0.9 %
5-40 SYRINGE (ML) INJECTION EVERY 12 HOURS SCHEDULED
Status: DISCONTINUED | OUTPATIENT
Start: 2024-11-08 | End: 2024-11-08 | Stop reason: HOSPADM

## 2024-11-08 RX ORDER — METOPROLOL TARTRATE 1 MG/ML
INJECTION, SOLUTION INTRAVENOUS
Status: DISCONTINUED | OUTPATIENT
Start: 2024-11-08 | End: 2024-11-08 | Stop reason: SDUPTHER

## 2024-11-08 RX ORDER — SODIUM CHLORIDE 450 MG/100ML
INJECTION, SOLUTION INTRAVENOUS ONCE
Status: COMPLETED | OUTPATIENT
Start: 2024-11-08 | End: 2024-11-08

## 2024-11-08 RX ORDER — MORPHINE SULFATE 2 MG/ML
2 INJECTION, SOLUTION INTRAMUSCULAR; INTRAVENOUS
Status: DISCONTINUED | OUTPATIENT
Start: 2024-11-08 | End: 2024-11-12 | Stop reason: HOSPADM

## 2024-11-08 RX ORDER — 0.9 % SODIUM CHLORIDE 0.9 %
500 INTRAVENOUS SOLUTION INTRAVENOUS CONTINUOUS PRN
Status: DISCONTINUED | OUTPATIENT
Start: 2024-11-08 | End: 2024-11-12 | Stop reason: HOSPADM

## 2024-11-08 RX ORDER — PROPOFOL 10 MG/ML
INJECTION, EMULSION INTRAVENOUS
Status: DISCONTINUED | OUTPATIENT
Start: 2024-11-08 | End: 2024-11-08 | Stop reason: SDUPTHER

## 2024-11-08 RX ORDER — LIDOCAINE HYDROCHLORIDE 10 MG/ML
1 INJECTION, SOLUTION EPIDURAL; INFILTRATION; INTRACAUDAL; PERINEURAL
Status: DISCONTINUED | OUTPATIENT
Start: 2024-11-08 | End: 2024-11-08 | Stop reason: HOSPADM

## 2024-11-08 RX ORDER — MAGNESIUM SULFATE IN WATER 40 MG/ML
2000 INJECTION, SOLUTION INTRAVENOUS PRN
Status: DISCONTINUED | OUTPATIENT
Start: 2024-11-08 | End: 2024-11-12 | Stop reason: HOSPADM

## 2024-11-08 RX ORDER — CITALOPRAM HYDROBROMIDE 20 MG/1
40 TABLET ORAL DAILY
Status: DISCONTINUED | OUTPATIENT
Start: 2024-11-08 | End: 2024-11-12 | Stop reason: HOSPADM

## 2024-11-08 RX ORDER — SODIUM CHLORIDE 0.9 % (FLUSH) 0.9 %
5-40 SYRINGE (ML) INJECTION PRN
Status: DISCONTINUED | OUTPATIENT
Start: 2024-11-08 | End: 2024-11-12 | Stop reason: HOSPADM

## 2024-11-08 RX ORDER — MEPERIDINE HYDROCHLORIDE 25 MG/ML
25 INJECTION INTRAMUSCULAR; INTRAVENOUS; SUBCUTANEOUS
Status: ACTIVE | OUTPATIENT
Start: 2024-11-08 | End: 2024-11-09

## 2024-11-08 RX ORDER — SCOLOPAMINE TRANSDERMAL SYSTEM 1 MG/1
1 PATCH, EXTENDED RELEASE TRANSDERMAL
Status: DISCONTINUED | OUTPATIENT
Start: 2024-11-08 | End: 2024-11-08 | Stop reason: HOSPADM

## 2024-11-08 RX ORDER — SODIUM CHLORIDE 9 MG/ML
INJECTION, SOLUTION INTRAVENOUS CONTINUOUS
Status: DISCONTINUED | OUTPATIENT
Start: 2024-11-08 | End: 2024-11-08

## 2024-11-08 RX ORDER — BUPIVACAINE HYDROCHLORIDE 2.5 MG/ML
INJECTION, SOLUTION INFILTRATION; PERINEURAL
Status: COMPLETED | OUTPATIENT
Start: 2024-11-08 | End: 2024-11-08

## 2024-11-08 RX ORDER — DEXMEDETOMIDINE HYDROCHLORIDE 4 UG/ML
INJECTION, SOLUTION INTRAVENOUS
Status: DISCONTINUED | OUTPATIENT
Start: 2024-11-08 | End: 2024-11-08 | Stop reason: SDUPTHER

## 2024-11-08 RX ORDER — FAMOTIDINE 20 MG/1
20 TABLET, FILM COATED ORAL EVERY EVENING
Status: DISCONTINUED | OUTPATIENT
Start: 2024-11-08 | End: 2024-11-12 | Stop reason: HOSPADM

## 2024-11-08 RX ORDER — GLUCAGON 1 MG/ML
1 KIT INJECTION PRN
Status: DISCONTINUED | OUTPATIENT
Start: 2024-11-08 | End: 2024-11-08 | Stop reason: SDUPTHER

## 2024-11-08 RX ORDER — ALLOPURINOL 100 MG/1
100 TABLET ORAL DAILY
Status: DISCONTINUED | OUTPATIENT
Start: 2024-11-08 | End: 2024-11-12 | Stop reason: HOSPADM

## 2024-11-08 RX ORDER — SODIUM CHLORIDE 9 MG/ML
INJECTION, SOLUTION INTRAVENOUS
Status: DISCONTINUED | OUTPATIENT
Start: 2024-11-08 | End: 2024-11-08 | Stop reason: SDUPTHER

## 2024-11-08 RX ORDER — OXYCODONE HYDROCHLORIDE 10 MG/1
5 TABLET ORAL EVERY 4 HOURS PRN
Status: DISCONTINUED | OUTPATIENT
Start: 2024-11-08 | End: 2024-11-12 | Stop reason: HOSPADM

## 2024-11-08 RX ORDER — OXYCODONE HYDROCHLORIDE 10 MG/1
10 TABLET ORAL EVERY 4 HOURS PRN
Status: DISCONTINUED | OUTPATIENT
Start: 2024-11-08 | End: 2024-11-12 | Stop reason: HOSPADM

## 2024-11-08 RX ORDER — POTASSIUM CHLORIDE 29.8 MG/ML
20 INJECTION INTRAVENOUS PRN
Status: DISCONTINUED | OUTPATIENT
Start: 2024-11-08 | End: 2024-11-12 | Stop reason: HOSPADM

## 2024-11-08 RX ORDER — MIDAZOLAM HYDROCHLORIDE 2 MG/2ML
2 INJECTION, SOLUTION INTRAMUSCULAR; INTRAVENOUS
Status: COMPLETED | OUTPATIENT
Start: 2024-11-08 | End: 2024-11-08

## 2024-11-08 RX ORDER — HEPARIN SODIUM 5000 [USP'U]/ML
5000 INJECTION, SOLUTION INTRAVENOUS; SUBCUTANEOUS EVERY 8 HOURS SCHEDULED
Status: DISCONTINUED | OUTPATIENT
Start: 2024-11-08 | End: 2024-11-12 | Stop reason: HOSPADM

## 2024-11-08 RX ORDER — AMINOCAPROIC ACID 250 MG/ML
INJECTION, SOLUTION INTRAVENOUS
Status: DISCONTINUED | OUTPATIENT
Start: 2024-11-08 | End: 2024-11-08 | Stop reason: SDUPTHER

## 2024-11-08 RX ORDER — INSULIN GLARGINE 100 [IU]/ML
0.15 INJECTION, SOLUTION SUBCUTANEOUS NIGHTLY
Status: DISCONTINUED | OUTPATIENT
Start: 2024-11-09 | End: 2024-11-12 | Stop reason: HOSPADM

## 2024-11-08 RX ORDER — ROCURONIUM BROMIDE 10 MG/ML
INJECTION, SOLUTION INTRAVENOUS
Status: DISCONTINUED | OUTPATIENT
Start: 2024-11-08 | End: 2024-11-08 | Stop reason: SDUPTHER

## 2024-11-08 RX ORDER — ONDANSETRON 2 MG/ML
4 INJECTION INTRAMUSCULAR; INTRAVENOUS EVERY 6 HOURS PRN
Status: DISCONTINUED | OUTPATIENT
Start: 2024-11-08 | End: 2024-11-12 | Stop reason: HOSPADM

## 2024-11-08 RX ORDER — DEXTROSE MONOHYDRATE 100 MG/ML
INJECTION, SOLUTION INTRAVENOUS CONTINUOUS PRN
Status: DISCONTINUED | OUTPATIENT
Start: 2024-11-08 | End: 2024-11-08 | Stop reason: SDUPTHER

## 2024-11-08 RX ORDER — SODIUM CHLORIDE, SODIUM LACTATE, POTASSIUM CHLORIDE, CALCIUM CHLORIDE 600; 310; 30; 20 MG/100ML; MG/100ML; MG/100ML; MG/100ML
INJECTION, SOLUTION INTRAVENOUS
Status: DISCONTINUED | OUTPATIENT
Start: 2024-11-08 | End: 2024-11-08

## 2024-11-08 RX ORDER — ZOLPIDEM TARTRATE 5 MG/1
5 TABLET ORAL NIGHTLY PRN
Status: DISCONTINUED | OUTPATIENT
Start: 2024-11-09 | End: 2024-11-12 | Stop reason: HOSPADM

## 2024-11-08 RX ORDER — SODIUM CHLORIDE, SODIUM GLUCONATE, SODIUM ACETATE, POTASSIUM CHLORIDE AND MAGNESIUM CHLORIDE 526; 502; 368; 37; 30 MG/100ML; MG/100ML; MG/100ML; MG/100ML; MG/100ML
INJECTION, SOLUTION INTRAVENOUS
Status: DISCONTINUED | OUTPATIENT
Start: 2024-11-08 | End: 2024-11-08 | Stop reason: SDUPTHER

## 2024-11-08 RX ORDER — LIDOCAINE HYDROCHLORIDE 10 MG/ML
INJECTION, SOLUTION EPIDURAL; INFILTRATION; INTRACAUDAL; PERINEURAL
Status: DISCONTINUED | OUTPATIENT
Start: 2024-11-08 | End: 2024-11-08 | Stop reason: SDUPTHER

## 2024-11-08 RX ORDER — MIDAZOLAM HYDROCHLORIDE 1 MG/ML
INJECTION, SOLUTION INTRAMUSCULAR; INTRAVENOUS
Status: DISCONTINUED | OUTPATIENT
Start: 2024-11-08 | End: 2024-11-08 | Stop reason: SDUPTHER

## 2024-11-08 RX ORDER — FAMOTIDINE 20 MG/1
20 TABLET, FILM COATED ORAL ONCE
Status: COMPLETED | OUTPATIENT
Start: 2024-11-08 | End: 2024-11-08

## 2024-11-08 RX ORDER — PROTAMINE SULFATE 10 MG/ML
INJECTION, SOLUTION INTRAVENOUS
Status: DISCONTINUED | OUTPATIENT
Start: 2024-11-08 | End: 2024-11-08 | Stop reason: SDUPTHER

## 2024-11-08 RX ADMIN — SODIUM CHLORIDE, PRESERVATIVE FREE 10 ML: 5 INJECTION INTRAVENOUS at 19:31

## 2024-11-08 RX ADMIN — INSULIN HUMAN 2 UNITS: 100 INJECTION, SOLUTION PARENTERAL at 09:13

## 2024-11-08 RX ADMIN — CEFAZOLIN 3 G: 1 INJECTION, POWDER, FOR SOLUTION INTRAMUSCULAR; INTRAVENOUS at 12:47

## 2024-11-08 RX ADMIN — MIDAZOLAM 1 MG: 1 INJECTION INTRAMUSCULAR; INTRAVENOUS at 08:02

## 2024-11-08 RX ADMIN — FAMOTIDINE 20 MG: 20 TABLET ORAL at 07:13

## 2024-11-08 RX ADMIN — BUPROPION HYDROCHLORIDE 100 MG: 100 TABLET, FILM COATED, EXTENDED RELEASE ORAL at 19:31

## 2024-11-08 RX ADMIN — FENTANYL CITRATE 50 MCG: 0.05 INJECTION, SOLUTION INTRAMUSCULAR; INTRAVENOUS at 07:54

## 2024-11-08 RX ADMIN — SUGAMMADEX 100 MG: 100 INJECTION, SOLUTION INTRAVENOUS at 13:26

## 2024-11-08 RX ADMIN — HEPARIN SODIUM 5000 UNITS: 5000 INJECTION INTRAVENOUS; SUBCUTANEOUS at 20:32

## 2024-11-08 RX ADMIN — INSULIN HUMAN 2 UNITS: 100 INJECTION, SOLUTION PARENTERAL at 11:00

## 2024-11-08 RX ADMIN — MIDAZOLAM 1 MG: 1 INJECTION INTRAMUSCULAR; INTRAVENOUS at 08:16

## 2024-11-08 RX ADMIN — MIDAZOLAM 2 MG: 1 INJECTION INTRAMUSCULAR; INTRAVENOUS at 08:23

## 2024-11-08 RX ADMIN — SODIUM CHLORIDE: 9 INJECTION, SOLUTION INTRAVENOUS at 08:43

## 2024-11-08 RX ADMIN — METOPROLOL TARTRATE 2 MG: 1 INJECTION, SOLUTION INTRAVENOUS at 08:55

## 2024-11-08 RX ADMIN — ALLOPURINOL 100 MG: 100 TABLET ORAL at 19:31

## 2024-11-08 RX ADMIN — HEPARIN SODIUM 5000 UNITS: 5000 INJECTION INTRAVENOUS; SUBCUTANEOUS at 14:39

## 2024-11-08 RX ADMIN — AMINOCAPROIC ACID 5000 MG: 250 INJECTION, SOLUTION INTRAVENOUS at 08:55

## 2024-11-08 RX ADMIN — ROCURONIUM BROMIDE 30 MG: 10 INJECTION, SOLUTION INTRAVENOUS at 09:19

## 2024-11-08 RX ADMIN — SODIUM CHLORIDE 2 UNITS/HR: 9 INJECTION, SOLUTION INTRAVENOUS at 09:56

## 2024-11-08 RX ADMIN — PROPOFOL 20 MG: 10 INJECTION, EMULSION INTRAVENOUS at 08:53

## 2024-11-08 RX ADMIN — LIDOCAINE HYDROCHLORIDE 1 ML: 10 INJECTION, SOLUTION INFILTRATION; PERINEURAL at 07:32

## 2024-11-08 RX ADMIN — PROPOFOL 20 MG: 10 INJECTION, EMULSION INTRAVENOUS at 08:58

## 2024-11-08 RX ADMIN — SODIUM CHLORIDE: 4.5 INJECTION, SOLUTION INTRAVENOUS at 07:59

## 2024-11-08 RX ADMIN — MIDAZOLAM 1 MG: 1 INJECTION INTRAMUSCULAR; INTRAVENOUS at 09:15

## 2024-11-08 RX ADMIN — SODIUM CHLORIDE: 9 INJECTION, SOLUTION INTRAVENOUS at 13:39

## 2024-11-08 RX ADMIN — SODIUM CHLORIDE, SODIUM GLUCONATE, SODIUM ACETATE, POTASSIUM CHLORIDE AND MAGNESIUM CHLORIDE: 526; 502; 368; 37; 30 INJECTION, SOLUTION INTRAVENOUS at 08:43

## 2024-11-08 RX ADMIN — LEVOTHYROXINE SODIUM 125 MCG: 125 TABLET ORAL at 19:31

## 2024-11-08 RX ADMIN — ROCURONIUM BROMIDE 20 MG: 10 INJECTION, SOLUTION INTRAVENOUS at 10:37

## 2024-11-08 RX ADMIN — FENTANYL CITRATE 50 MCG: 0.05 INJECTION, SOLUTION INTRAMUSCULAR; INTRAVENOUS at 11:30

## 2024-11-08 RX ADMIN — PHENYLEPHRINE HYDROCHLORIDE 100 MCG: 10 INJECTION INTRAVENOUS at 08:23

## 2024-11-08 RX ADMIN — CALCITRIOL CAPSULES 0.25 MCG 0.5 MCG: 0.25 CAPSULE ORAL at 19:31

## 2024-11-08 RX ADMIN — MORPHINE SULFATE 2 MG: 2 INJECTION, SOLUTION INTRAMUSCULAR; INTRAVENOUS at 19:44

## 2024-11-08 RX ADMIN — MIDAZOLAM 1 MG: 1 INJECTION INTRAMUSCULAR; INTRAVENOUS at 08:09

## 2024-11-08 RX ADMIN — AMINOCAPROIC ACID 5000 MG: 250 INJECTION, SOLUTION INTRAVENOUS at 08:59

## 2024-11-08 RX ADMIN — ROCURONIUM BROMIDE 20 MG: 10 INJECTION, SOLUTION INTRAVENOUS at 11:48

## 2024-11-08 RX ADMIN — FAMOTIDINE 20 MG: 20 TABLET ORAL at 19:31

## 2024-11-08 RX ADMIN — FENTANYL CITRATE 50 MCG: 0.05 INJECTION, SOLUTION INTRAMUSCULAR; INTRAVENOUS at 11:19

## 2024-11-08 RX ADMIN — ROCURONIUM BROMIDE 70 MG: 10 INJECTION, SOLUTION INTRAVENOUS at 08:23

## 2024-11-08 RX ADMIN — CEFAZOLIN 3 G: 1 INJECTION, POWDER, FOR SOLUTION INTRAMUSCULAR; INTRAVENOUS at 08:53

## 2024-11-08 RX ADMIN — LIDOCAINE HYDROCHLORIDE 50 MG: 10 INJECTION, SOLUTION EPIDURAL; INFILTRATION; INTRACAUDAL; PERINEURAL at 08:23

## 2024-11-08 RX ADMIN — MORPHINE SULFATE 4 MG: 4 INJECTION, SOLUTION INTRAMUSCULAR; INTRAVENOUS at 23:43

## 2024-11-08 RX ADMIN — DEXMEDETOMIDINE HYDROCHLORIDE 0.4 MCG/KG/HR: 400 INJECTION, SOLUTION INTRAVENOUS at 15:11

## 2024-11-08 RX ADMIN — DEXMEDETOMIDINE HYDROCHLORIDE 0.4 MCG/KG/HR: 4 INJECTION, SOLUTION INTRAVENOUS at 12:53

## 2024-11-08 RX ADMIN — MUPIROCIN: 20 OINTMENT TOPICAL at 05:43

## 2024-11-08 RX ADMIN — FENTANYL CITRATE 100 MCG: 0.05 INJECTION, SOLUTION INTRAMUSCULAR; INTRAVENOUS at 08:55

## 2024-11-08 RX ADMIN — FENTANYL CITRATE 200 MCG: 0.05 INJECTION, SOLUTION INTRAMUSCULAR; INTRAVENOUS at 08:23

## 2024-11-08 RX ADMIN — PROTAMINE SULFATE 150 MG: 10 INJECTION, SOLUTION INTRAVENOUS at 12:32

## 2024-11-08 RX ADMIN — PROPOFOL 20 MG: 10 INJECTION, EMULSION INTRAVENOUS at 08:23

## 2024-11-08 RX ADMIN — BUPIVACAINE 15 ML: 13.3 INJECTION, SUSPENSION, LIPOSOMAL INFILTRATION at 07:32

## 2024-11-08 RX ADMIN — MORPHINE SULFATE 4 MG: 4 INJECTION, SOLUTION INTRAMUSCULAR; INTRAVENOUS at 13:32

## 2024-11-08 RX ADMIN — HEPARIN SODIUM 25000 UNITS: 1000 INJECTION, SOLUTION INTRAVENOUS; SUBCUTANEOUS at 10:45

## 2024-11-08 RX ADMIN — CITALOPRAM HYDROBROMIDE 40 MG: 20 TABLET ORAL at 19:31

## 2024-11-08 RX ADMIN — ROCURONIUM BROMIDE 20 MG: 10 INJECTION, SOLUTION INTRAVENOUS at 11:34

## 2024-11-08 RX ADMIN — 0.12% CHLORHEXIDINE GLUCONATE 15 ML: 1.2 RINSE ORAL at 05:38

## 2024-11-08 RX ADMIN — BUPIVACAINE HYDROCHLORIDE 15 ML: 2.5 INJECTION, SOLUTION INFILTRATION; PERINEURAL at 07:32

## 2024-11-08 RX ADMIN — PROPOFOL 20 MG: 10 INJECTION, EMULSION INTRAVENOUS at 08:51

## 2024-11-08 RX ADMIN — FENTANYL CITRATE 50 MCG: 0.05 INJECTION, SOLUTION INTRAMUSCULAR; INTRAVENOUS at 11:34

## 2024-11-08 RX ADMIN — MIDAZOLAM 2 MG: 1 INJECTION INTRAMUSCULAR; INTRAVENOUS at 07:23

## 2024-11-08 ASSESSMENT — PULMONARY FUNCTION TESTS
PIF_VALUE: 22
PIF_VALUE: 20
PIF_VALUE: 21
PIF_VALUE: 20
PIF_VALUE: 11
PIF_VALUE: 11
PIF_VALUE: 20
PIF_VALUE: 20

## 2024-11-08 ASSESSMENT — PAIN DESCRIPTION - ORIENTATION: ORIENTATION: LEFT

## 2024-11-08 ASSESSMENT — ENCOUNTER SYMPTOMS: SHORTNESS OF BREATH: 0

## 2024-11-08 ASSESSMENT — PAIN DESCRIPTION - LOCATION
LOCATION: BACK
LOCATION: STERNUM;BACK
LOCATION: CHEST

## 2024-11-08 ASSESSMENT — PAIN DESCRIPTION - DESCRIPTORS: DESCRIPTORS: ACHING

## 2024-11-08 ASSESSMENT — PAIN - FUNCTIONAL ASSESSMENT: PAIN_FUNCTIONAL_ASSESSMENT: PREVENTS OR INTERFERES SOME ACTIVE ACTIVITIES AND ADLS

## 2024-11-08 ASSESSMENT — LIFESTYLE VARIABLES: SMOKING_STATUS: 0

## 2024-11-08 ASSESSMENT — PAIN SCALES - GENERAL
PAINLEVEL_OUTOF10: 6
PAINLEVEL_OUTOF10: 10
PAINLEVEL_OUTOF10: 9
PAINLEVEL_OUTOF10: 2

## 2024-11-08 ASSESSMENT — PAIN SCALES - WONG BAKER: WONGBAKER_NUMERICALRESPONSE: NO HURT

## 2024-11-08 NOTE — PROGRESS NOTES
Pt up to chair with assistX2 RN   Complaints of some slight dizziness but resolved once seated. VSS

## 2024-11-08 NOTE — FLOWSHEET NOTE
[facility] INPATIENT SERVICES  DIALYSIS TREATMENT SUMMARY      Note: Consult with the attending physician for patient treatment orders, this document is not a physician order.      Patient Information   Patient Sara Pardo   Date of Birth April 05, 1960   Chart Number [id number]   Location [facility]   Location MRN [id number]   Gender Female   SSN (last 4) [id number]     Treatment Information   Treatment Type Hemodialysis   Treatment Id [id number]   Start Time November 07, 2024 15:54   End Time November 07, 2024 18:56   Acutal Duration 03:02     Treatment Balances   Total Saline Administered 500   Net Fluid Balance 2700    Hemodialysis Orders   Therapy Standard   Orders Verified Time 11/07/2024 15:25    Date Verified 11/07/2024   Duration 3:00   Isolated UF/Bypass No   BFR (mL) 450   DFR X1.5 BFR   DFR (mL) 700   Dialyzer Type OPTIFLUX 160NR   UF Order UF Range   UF Range (mL) 1500 - 3000   With BP Parameters Yes   As Tolerated Yes   Crit-Line used No   Heparin Initial Units Bolus No   Heparin IV Maintenance Bolus No   Heparin IV Infusion No   Potassium (mEq/L) 2   Calcium (mEq/L) 2.5   Bicarb (mg/dL) 35   Sodium (mEq/L) 138   Additional Orders KEEP SYSTOLIC BLOOD PRESSURE GREATER THAN 100.   Clinician Andrew Marti, ISAAC    Dialysis Access   Access Type AV Access   AV Access   Access Location Upper Arm - L   Needle 15g    15g Buttonhole   Bruit Yes   Thrill Yes   Notes LEFT UPPER ARM AV FISTULA, GOOO THRILL AND BRUIT, SITE CLEAR      Vitals   Pre-Treatment Vitals   Time Is BP being recorded? Pre BP Map BP Method Pulse RR Temp How was Weight Obtained? Pre Weight Previous Dry Weight Previous Post Weight Metric Target Fluid Removal (mL) Dialysate Confirmed Clinician    11/07/2024 15:50 BP/Map 171/80 (110) Noninvasive 65 20 98.2 How Obtained: Stand-Up Scale 132   Kgs 1500 Yes Andrew Marti, ISAAC    Comments: UF FLUID REMOVAL RANGE 1500 ML TO 3000 ML AS TOLERATED. PATIENT IDENTIFICATION VERIFIED, ALLERGIES      Treatment Comments   Treatment Notes PATIENT IS ALERT, ORIENTED X 4, NO PAIN, NO CHEST PAIN. RESPIRATIONS 20, EVEN AND UNLABORED. LUNGS CLEAR AND DIMINISHED. HEART RATE REGULAR, S1 S2, SINUS RHYTHM PER TELEMETRY AT 70. BLOOD PRESSURE 122/51, TEMPERATURE 97.7 DEGREES. SKIN WARM AND DRY. NO EDEMA. REMOVED 2700 ML OF FLUID ON DIALYSIS. POST WEIGHT .3 KG. TOLERATED TREATMENT WELL.     Post-Treatment Handoff   Report Given to Primary Nurse SAMIA CAMARENA RN   Time Report Given 19:22   Report Given By Andrew Marti RN    Machine Validation   Time 15:20   Date 11/7/2024   Auto Alarm Test Passed Yes   Machine Serial # [id number]   Portable RO    RO Serial#    Residual Bleach Negative Yes   Was a manufactured mix used? Yes   Machine Log Completed Yes   Total Chlorine (less than 0.1)? Yes   Total Chlorine Log Completed Yes   Bicarb BiBag   Bibag Size 900   Machine Temp 36.5   Machine Conductivity 13.8   Meter Type N/A   Meter Conductivity    Independent Conductivity 13.8   pH Status Pass Pass   pH    TCD Value 13.7   TCD Alarm with +/- 0.5 Yes   NVL enabled validated 100 asymmetric Yes   Safety check complete Andrew Marti RN   Second Verification Performed? Yes   Second Verification Performed By Andrew Marti RN   Reason Not Verified       Serum Lab Values   Time BUN Creatinine Na K (mEq/L) Cl CO2 Ca (mEq/L) Phos Mg (mg/dL) Alb (g/dL) Glucose Hgb Hct WBC Plt PT aPTT INR Other Clinician    11/07/2024 16:28 - - - - - - - - - - - - - - - - - - - Andrew Marti RN    Comments:         Facility Information Room # 714 Diagnosis SEVERE 2 VESSEL CORONARY ARTERY DISEASE LAD AND DIAGONAL, RCA STENOSIS; ESRD; MYOCARDIAL ISCHEMIA; MORBID OBESITY; DIABETES TYPE 2; HYPERTENSION   Facility Information Stat Treatment Yes Isolation Information   Admission Date 11/07/2024 Patient Type Chronic dialysis patient with diagnosis of ESRD Isolation Required? N   Ordering MD DR. ESPINO Patient Chronic Unit [facility] Completed by

## 2024-11-08 NOTE — ANESTHESIA PRE PROCEDURE
Department of Anesthesiology  Preprocedure Note       Name:  Sara Pardo   Age:  64 y.o.  :  1960                                          MRN:  [id number]         Date:  2024      Surgeon: Surgeon(s):  Niko Morataya MD    Procedure: Procedure(s):  ROBOTIC CABG X2 WITH BILATERAL INTERNAL MAMMARY ARTERIES WITH PERFUSION, TRANSESOPHAGEAL ECHOCARDIOGRAM    Medications prior to admission:   Prior to Admission medications    Medication Sig Start Date End Date Taking? Authorizing Provider   REPATHA SOSY syringe Inject 3 mLs into the skin every 14 days Pt takes every other . 10/14/24   Aspen Rosa MD   buPROPion (WELLBUTRIN SR) 100 MG extended release tablet Take 1 tablet by mouth daily 10/14/24 4/12/25  Deepali Gilliland MD   levothyroxine (SYNTHROID) 125 MCG tablet Take 1 tablet by mouth Daily 10/14/24 4/12/25  Deepali Gilliland MD   carvedilol (COREG) 3.125 MG tablet Take 2 tablets by mouth daily 24   Aspen Rosa MD   traZODone (DESYREL) 100 MG tablet Take 1 tablet by mouth nightly 10/4/24 4/2/25  Deepali Gilliland MD   famotidine (PEPCID) 20 MG tablet TAKE (1/2) TABLET BY MOUTH EVERY EVENING FOR GERD  Patient taking differently: Take 1 tablet by mouth every evening 24   Deepali Gilliland MD   citalopram (CELEXA) 40 MG tablet TAKE ONE TABLET BY MOUTH EVERY DAY 6/3/24   Deepali Gilliland MD   rosuvastatin (CRESTOR) 20 MG tablet Take 1 tablet by mouth daily 24  Deepali Gilliland MD   fluticasone (FLONASE) 50 MCG/ACT nasal spray 2 sprays by Nasal route daily as needed for Rhinitis 24   Deepali Gilliland MD   Insulin Disposable Pump (OMNIPOD 5 G6 POD, GEN 5,) MISC 200 Units by Other route continuous Via pump 23   Aspen Rosa MD   sevelamer (RENVELA) 800 MG tablet TAKE 4 TABLETS BY MOUTH THREE TIMES DAILY WITH MEALS AND 2 TABLETS WITH A SNACK 23   Provider, MD Aspen   Semaglutide,0.25 or 0.5MG/DOS,

## 2024-11-08 NOTE — ANESTHESIA POSTPROCEDURE EVALUATION
Department of Anesthesiology  Postprocedure Note    Patient: Sara Pardo  MRN: [id number]  YOB: 1960  Date of evaluation: 11/8/2024    Procedure Summary       Date: 11/08/24 Room / Location: [address]    Anesthesia Start: 0759 Anesthesia Stop: 1327    Procedure: ROBOTIC CABG X2 WITH BILATERAL INTERNAL MAMMARY ARTERIES WITH PERFUSION STANDBY, TRANSESOPHAGEAL ECHOCARDIOGRAM Diagnosis:       Coronary artery disease      (Coronary artery disease [I25.10])    Surgeons: Niko Morataya MD Responsible Provider: Araceli De La Torre APRN - CRNA    Anesthesia Type: general, regional ASA Status: 4            Anesthesia Type: No value filed.    No Phase I: No Score: 10    No Phase II:      Anesthesia Post Evaluation    Patient location during evaluation: ICU  Patient participation: complete - patient cannot participate  Level of consciousness: sedated and ventilated  Airway patency: patent  Nausea & Vomiting: no nausea  Cardiovascular status: hemodynamically stable  Respiratory status: acceptable and ventilator  Hydration status: stable  Pain management: adequate    No notable events documented.

## 2024-11-08 NOTE — ANESTHESIA PROCEDURE NOTES
Airway  Date/Time: 11/8/2024 12:51 PM  Urgency: elective    Airway not difficult    General Information and Staff    Patient location during procedure: OR  Resident/CRNA: Araceli De La Torre APRN - CRNA  Performed: resident/CRNA/CAA   Performed by: Araceli De La Torre APRN - CRNA  Authorized by: Araceli De La Torre APRN - CRNA      Indications and Patient Condition  Indications for airway management: anesthesia  Spontaneous Ventilation: absent  Sedation level: deep  Preoxygenated: yes  Patient position: sniffing  Mask difficulty assessment: not attempted    Final Airway Details  Final airway type: endotracheal airway      Successful airway: ETT  Cuffed: yes   Successful intubation technique: video laryngoscopy  Facilitating devices/methods: intubating stylet  Endotracheal tube insertion site: oral  Blade: Brad (Glidescope)  Blade size: #3 (3.5)  ETT size (mm): 7.5  Cormack-Lehane Classification: grade IIa - partial view of glottis  Placement verified by: chest auscultation and capnometry   Measured from: lips  ETT to lips (cm): 21  Number of attempts at approach: 1  Number of other approaches attempted: 0    Additional Comments  Teeth/lips in preop condition  Exchanged VICKIE for single lumen 7.5 ETT with GlideScope. Easy exchange.  no      
Airway  Date/Time: 11/8/2024 8:26 AM  Urgency: elective    Airway not difficult    General Information and Staff    Patient location during procedure: OR  Resident/CRNA: Araceli De La Torre APRN - CRNA  Performed: resident/CRNA/CAA   Performed by: Araceli De La Torre APRN - CRNA  Authorized by: Araceli De La Torre APRN - CRNA      Indications and Patient Condition  Indications for airway management: anesthesia  Preoxygenated: yes  Mask difficulty assessment: vent by bag mask    Final Airway Details  Final airway type: endotracheal airway      Successful airway: ETT  Cuffed: yes   Successful intubation technique: direct laryngoscopy  Facilitating devices/methods: intubating stylet  Endotracheal tube insertion site: oral  Blade type: Glidescope.  Blade size: 3.  ETT size (mm): 7.5  Cormack-Lehane Classification: grade I - full view of glottis  Placement verified by: chest auscultation and capnometry   Measured from: lips  ETT to lips (cm): 30  Number of attempts at approach: 1    Additional Comments  Teeth/lips in preop condition, intubated by ANIYAH Molina        
Central Venous Line:    A central venous line was placed using ultrasound guidance, in the OR for the following indication(s): central venous access.    Sterility preparation included the following: provider used sterile gloves, gown, hat and mask, hand hygiene performed prior to central venous catheter insertion, sterile gel and probe cover used in ultrasound-guided central venous catheter insertion and maximum sterile barriers used during central venous catheter insertion.    The patient was placed in supine position.The right internal jugular vein was prepped.    The site was prepped with Chloraprep.  A 9 Fr (size), 10 (length), introducer single lumen was placed.    During the procedure, the following specific steps were taken: target vein identified, needle advanced into vein and blood aspirated and guidewire advanced into vein.    Intravenous verification was obtained by ultrasound.    Post insertion care included: all ports aspirated, all ports flushed easily, guidewire removed intact, Biopatch applied, line sutured in place and dressing applied.    During the procedure the patient experienced: patient tolerated procedure well with no complications.      Insertion site scrubbed per usage guidelines?: Yes  Skin prep agent dried for 3 minutes prior to procedure?:yes  Outcomes: patient tolerated procedure wellNo  Anesthesia type: generalA(n) oximetric, 8 (size) Pulmonary Artery Catheter (PAC) was placed through the Introducer CVL in the right internal jugular vein.  The PAC placement was confirmed by pressure tracing changes.  The patient experienced the following events during the procedure: patient tolerated procedure well with no complications.PA Cath placed?: Yes  Staffing  Anesthesiologist: Daniela Eisenberg MD  Performed by: Daniela Eisenberg MD  Authorized by: Daniela Eisenberg MD    Preanesthetic Checklist  Completed: patient identified, IV checked, site marked, risks and benefits discussed, 
Peripheral Block    Patient location during procedure: holding area  Reason for block: post-op pain management  Start time: 11/8/2024 7:32 AM  Staffing  Performed: anesthesiologist   Anesthesiologist: Daniela Eisenberg MD  Performed by: Daniela Eisenberg MD  Authorized by: Daniela Eisenberg MD    Preanesthetic Checklist  Completed: patient identified, IV checked, site marked, risks and benefits discussed, surgical/procedural consents, equipment checked, pre-op evaluation, timeout performed, anesthesia consent given, oxygen available, monitors applied/VS acknowledged, fire risk safety assessment completed and verbalized and blood product R/B/A discussed and consented  Peripheral Block   Patient position: sitting  Prep: ChloraPrep  Provider prep: mask and sterile gloves  Patient monitoring: cardiac monitor, continuous pulse ox, frequent blood pressure checks, IV access and responsive to questions  Block type: Erector spinae  Laterality: left  Injection technique: single-shot  Guidance: ultrasound guided    Needle   Needle type: Tuohy   Needle gauge: 22 G  Needle localization: ultrasound guidance  Needle length: 10 cm  Assessment   Injection assessment: negative aspiration for heme, no paresthesia on injection and local visualized surrounding nerve on ultrasound  Paresthesia pain: none  Slow fractionated injection: yes  Hemodynamics: stable  Outcomes: patient tolerated procedure well    Medications Administered  lidocaine injection 1% - Subcuticular   1 mL - 11/8/2024 7:32:00 AM  BUPivacaine (MARCAINE) injection 0.25% - Perineural   15 mL - 11/8/2024 7:32:00 AM  BUPivacaine liposome (EXPAREL) injection 1.3% - Perineural   15 mL - 11/8/2024 7:32:00 AM          
Procedure Performed: LANNY       Start Time:        End Time:          Preanesthesia Checklist:  Patient identified, IV assessed, risks and benefits discussed, monitors and equipment assessed, procedure being performed at surgeon's request and anesthesia consent obtained.    General Procedure Information  Diagnostic Indications for Echo:  hemodynamic monitoring  Location performed:  OR  Intubated  Bite block placed  Heart visualized  Probe Insertion:  Easy  Probe Type:  3D  Modalities:  2D and color flow mapping            
171.9

## 2024-11-08 NOTE — BRIEF OP NOTE
Brief Postoperative Note      Patient: Sara Pardo  YOB: 1960  MRN: [id number]    Date of Procedure: 11/8/2024    Pre-Op Diagnosis Codes:      * Coronary artery disease [I25.10]    Post-Op Diagnosis: Same       Procedure(s):  ROBOTIC CABG X2 WITH BILATERAL INTERNAL MAMMARY ARTERIES WITH PERFUSION STANDBY, TRANSESOPHAGEAL ECHOCARDIOGRAM    Surgeon(s):  Niko Morataya MD    Assistant:  First Assistant: Sharmin Leblanc RN    Anesthesia: General    Estimated Blood Loss (mL): less than 100     Complications: None    Specimens:   * No specimens in log *    Implants:  Implant Name Type Inv. Item Serial No.  Lot No. LRB No. Used Action   CLIP INT L POLYMER CAMILLE LIG HEM O CAMILLE (6EA/PK) - [id number]  CLIP INT L POLYMER CAMILLE LIG HEM O CAMILLE (6EA/PK)  24/7 [employer]- [id number] N/A 1 Implanted   CLIP INT SM TI EZ LD LIG SYS [employer] - [id number]  CLIP INT SM TI EZ LD LIG SYS W[employer]K [employer]  [employer]- [id number] N/A 2 Implanted   CLIP INT WECK SM WIDE RED TI TRNSVRS GRV CHEVRON SHP W/ PERCIS TIP 6 PER PK - [id number]  CLIP INT WECK SM WIDE RED TI TRNSVRS GRV CHEVRON SHP W/ PERCIS TIP 6 PER PK  [employer]- [id number] N/A 6 Implanted   PLATE BNE 4 H STR STERNALOCK TASNEEM KAMRYN CLSR SYS - [id number]  PLATE BNE 4 H STR STERNALOCK TASNEEM KAMRYN CLSR SYS  JOCELYN BIOMET MICROFIXATION-WD  N/A 1 Implanted   SCREW BNE L12MM DIA2.4MM G CANC TI SELF DRL CAMILLE FULL THRD - [id number]  SCREW BNE L12MM DIA2.4MM G CANC TI SELF DRL CAMILLE FULL THRD  JOCELYN BIOMET MICROFIXATION-WD  N/A 3 Implanted         Drains:   Chest Tube Left Pleural 1 (Active)       Urinary Catheter 11/08/24 Marrero-Temperature (Active)       Findings:  Infection Present At Time Of Surgery (PATOS) (choose all levels that have infection present):  No infection present  Other Findings: Good BIMA conduits, good LAD, diag 1 targets; grafted off - pump. LVEF normal.    Electronically signed by Niko Morataya MD on 11/8/2024 at 12:53 PM

## 2024-11-08 NOTE — PROGRESS NOTES
Renal Progress Note    Thank you to requesting provider: Dr Morataya, for asking us to see Sara Pardo    Reason for consultation: ESRD management    Chief Complaint: Coronary artery disease    History of Presenting Illness      Patient is a 64-year-old pleasant woman with a past medical history of morbid obesity, hypertension, type 2 diabetes, history of DVT status post IVC filter, and coronary artery disease.  She was given a murmur and underwent cardiac workup.  She was found with two-vessel disease not amenable to PCI.  Patient was seen by CT surgeon and non invasive CABG technique was offered.  Renal service was consulted to manage her ESRD.  Patient has last received home hemodialysis on November 5.  She offers no other concerns or complaints.  She denies a chest pain.  Patient is s/p dialysis on 11/7. Today, she underwent robotic assisted CABG x 2 vessels with bilateral mammary arteries. Patient is seen in post-operative period. She was awake, on no pressors.       Past Medical/Surgical History      Active Ambulatory Problems     Diagnosis Date Noted    Morbid obesity 09/15/2011    GERI on CPAP 02/26/2017    Moderate major depression (HCC) 02/26/2017    Hypertriglyceridemia 02/26/2017    Anxiety 02/26/2017    Vitamin D deficiency 02/26/2017    Hypocalcemia 02/26/2017    Allergic rhinitis with postnasal drip 04/06/2021    B12 deficiency 10/17/2016    History of DVT (deep vein thrombosis) 10/09/2019    ESRD on peritoneal dialysis (HCC) 07/24/2018    Hypertension 08/10/2021    Pyelonephritis 06/25/2022    Colitis 10/22/2022    Great toe pain, left 01/11/2023    Anemia in end-stage renal disease (HCC) 02/09/2023    Extreme obesity 03/30/2023    Non-restorative sleep 03/30/2023    Right trigeminal neuralgia 04/06/2023    Acquired hypothyroidism 02/06/2017    Anaphylactic shock, unspecified, initial encounter 08/13/2021    Chest pain, unspecified 08/13/2021    Gastric polyps 04/11/2023    Hyperkalemia 08/13/2021     Q24H, Niko Morataya MD    dexmedeTOMIDine (PRECEDEX) 400 mcg in sodium chloride 0.9 % 100 mL infusion, 0.1-1.5 mcg/kg/hr, IntraVENous, Continuous, Niko Morataya MD, Last Rate: 12.9 mL/hr at 11/08/24 1511, 0.4 mcg/kg/hr at 11/08/24 1511  No outpatient medications have been marked as taking for the 11/7/24 encounter (Hospital Encounter).       Allergies   Codeine    Family History       Family History   Problem Relation Age of Onset    Lung Cancer Mother     Brain Cancer Mother     Heart Attack Father     Prostate Cancer Father     Heart Disease Father     Stroke Father     Obesity Father     No Known Problems Brother     Uterine Cancer Maternal Grandmother     Cervical Cancer Maternal Grandmother     Diabetes Maternal Grandfather     Other Maternal Grandfather         histoplasmosis    Obesity Paternal Grandmother     Diabetes Paternal Grandfather     Kidney Disease Paternal Grandfather     Other Daughter         Tourettes, Ldiia Tabes syndrome     Family history negative for kidney disease.     Social History      Social History     Socioeconomic History    Marital status:      Spouse name: Mr. Sabino Pardo    Number of children: 1    Years of education: None    Highest education level: None   Occupational History    Occupation: RN at [employer]     Comment: retired from that and was disabled later at a different job   Tobacco Use    Smoking status: Never     Passive exposure: Past (all growing up Dad smoked cigars and Mom smoked ciggarettes near her)    Smokeless tobacco: Never   Vaping Use    Vaping status: Never Used   Substance and Sexual Activity    Alcohol use: Never    Drug use: No   Social History Narrative    CODE STATUS: DNR    HEALTH CARE PROXY: Mr. Sabino Pardo, Her , [phone]    AMBULATES: uses a wheel chair when out, walks at home independently    DOMICILED: has stairs in her home to the den and washer which she does not use     Social Determinants of Health     Financial

## 2024-11-08 NOTE — PROGRESS NOTES
4 Eyes Skin Assessment     NAME:  Sara Pardo  YOB: 1960  MEDICAL RECORD NUMBER:  [id number]    The patient is being assessed for  Admission    I agree that at least one RN has performed a thorough Head to Toe Skin Assessment on the patient. ALL assessment sites listed below have been assessed.      Areas assessed by both nurses:    Head, Face, Ears, Shoulders, Back, Chest, Arms, Elbows, Hands, Sacrum. Buttock, Coccyx, Ischium, Legs. Feet and Heels, and Under Medical Devices         Does the Patient have a Wound? No noted wound(s)       Karel Prevention initiated by RN: Yes  Wound Care Orders initiated by RN: No    Pressure Injury (Stage 3,4, Unstageable, DTI, NWPT, and Complex wounds) if present, place Wound referral order by RN under : No    New Ostomies, if present place, Ostomy referral order under : No     Nurse 1 eSignature: Electronically signed by Jovanna De Leon RN on 11/8/24 at 5:28 PM CST    **SHARE this note so that the co-signing nurse can place an eSignature**    Nurse 2 eSignature: Electronically signed by Beverly Salcedo RN on 11/8/24 at 6:25 PM CST

## 2024-11-08 NOTE — PROGRESS NOTES
Pt requiring no vaso pressors and in stable condition. CI 2.8-3.3 since arrival   Dr. Morataya stated okay to remove swan at this time

## 2024-11-08 NOTE — PROGRESS NOTES
4 Eyes Skin Assessment     NAME:  Sara Pardo  YOB: 1960  MEDICAL RECORD NUMBER:  [id number]    The patient is being assessed for  Admission    I agree that at least one RN has performed a thorough Head to Toe Skin Assessment on the patient. ALL assessment sites listed below have been assessed.      Areas assessed by both nurses:    Head, Face, Ears, Shoulders, Back, Chest, Arms, Elbows, Hands, Sacrum. Buttock, Coccyx, Ischium, and Legs. Feet and Heels        Does the Patient have a Wound? No noted wound(s)       Karel Prevention initiated by RN: No  Wound Care Orders initiated by RN: No    Pressure Injury (Stage 3,4, Unstageable, DTI, NWPT, and Complex wounds) if present, place Wound referral order by RN under : No    New Ostomies, if present place, Ostomy referral order under : No     Nurse 1 eSignature: Electronically signed by Dorothea Lora RN on 11/8/24 at 4:42 AM CST    **SHARE this note so that the co-signing nurse can place an eSignature**    Nurse 2 eSignature: Electronically signed by Felecia Díaz RN on 11/8/24 at 4:43 AM CST

## 2024-11-08 NOTE — PROGRESS NOTES
Pharmacy Renal Adjustment    Sara Pardo is a 64 y.o. female. Pharmacy has renally adjusted medications per protocol.    No results for input(s): \"BUN\" in the last 72 hours.    Recent Labs     11/08/24  1050 11/08/24  1239   CREATININE 7.1* 7.5*       Estimated Creatinine Clearance: 10 mL/min (A) (based on SCr of 7.5 mg/dL (H)).    Height:   Ht Readings from Last 1 Encounters:   11/06/24 1.664 m (5' 5.5\")     Weight:  Wt Readings from Last 1 Encounters:   11/07/24 129.3 kg (285 lb 0.9 oz)       CKD stage: ESRD on home hemodialysis       Baseline SCr: ESRD on home hemodialysis     Plan: Adjust the following medications based on renal function:           Cefazolin 3 gm IV every 8 hours x 5 doses adjusted to Cefazolin 1 gm IV every 24 hours x 2 doses.     Electronically signed by Yue Barry RPH on 11/8/2024 at 1:30 PM

## 2024-11-09 ENCOUNTER — APPOINTMENT (OUTPATIENT)
Dept: GENERAL RADIOLOGY | Age: 64
End: 2024-11-09
Attending: SURGERY
Payer: MEDICARE

## 2024-11-09 LAB
ANION GAP SERPL CALCULATED.3IONS-SCNC: 21 MMOL/L (ref 7–19)
BUN SERPL-MCNC: 55 MG/DL (ref 8–23)
CALCIUM SERPL-MCNC: 9.5 MG/DL (ref 8.8–10.2)
CHLORIDE SERPL-SCNC: 96 MMOL/L (ref 98–111)
CO2 SERPL-SCNC: 20 MMOL/L (ref 22–29)
CREAT SERPL-MCNC: 9.1 MG/DL (ref 0.5–0.9)
ERYTHROCYTE [DISTWIDTH] IN BLOOD BY AUTOMATED COUNT: 15.9 % (ref 11.5–14.5)
GLUCOSE BLD-MCNC: 130 MG/DL (ref 70–99)
GLUCOSE BLD-MCNC: 134 MG/DL (ref 70–99)
GLUCOSE BLD-MCNC: 153 MG/DL (ref 70–99)
GLUCOSE BLD-MCNC: 160 MG/DL (ref 70–99)
GLUCOSE BLD-MCNC: 249 MG/DL (ref 70–99)
GLUCOSE BLD-MCNC: 273 MG/DL (ref 70–99)
GLUCOSE BLD-MCNC: 327 MG/DL (ref 70–99)
GLUCOSE SERPL-MCNC: 165 MG/DL (ref 70–99)
HCT VFR BLD AUTO: 37.8 % (ref 37–47)
HGB BLD-MCNC: 11.6 G/DL (ref 12–16)
INR PPP: 1.12 (ref 0.88–1.18)
MAGNESIUM SERPL-MCNC: 2.4 MG/DL (ref 1.6–2.4)
MCH RBC QN AUTO: 32.1 PG (ref 27–31)
MCHC RBC AUTO-ENTMCNC: 30.7 G/DL (ref 33–37)
MCV RBC AUTO: 104.7 FL (ref 81–99)
PERFORMED ON: ABNORMAL
PLATELET # BLD AUTO: 194 K/UL (ref 130–400)
PMV BLD AUTO: 9.2 FL (ref 9.4–12.3)
POTASSIUM SERPL-SCNC: 4.8 MMOL/L (ref 3.5–5)
PROTHROMBIN TIME: 14.1 SEC (ref 12–14.6)
RBC # BLD AUTO: 3.61 M/UL (ref 4.2–5.4)
SODIUM SERPL-SCNC: 137 MMOL/L (ref 136–145)
VAS LEFT CCA MID EDV: 13 CM/S
VAS LEFT CCA MID PSV: 88.2 CM/S
VAS LEFT CCA PROX PSV: 96.3 CM/S
VAS LEFT ECA PSV: 78.3 CM/S
VAS LEFT GSV AT KNEE DIAM: 4.9 MM
VAS LEFT GSV BK DIST DIAM: 1.8 MM
VAS LEFT GSV BK MID DIAM: 1.8 MM
VAS LEFT GSV BK PROX DIAM: 4.1 MM
VAS LEFT GSV JUNC DIAM: 8.8 MM
VAS LEFT GSV THIGH DIST DIAM: 5.8 MM
VAS LEFT GSV THIGH MID DIAM: 6.5 MM
VAS LEFT GSV THIGH PROX DIAM: 6.4 MM
VAS LEFT ICA DIST EDV: 19.9 CM/S
VAS LEFT ICA DIST PSV: 62.7 CM/S
VAS LEFT ICA MID EDV: 14.9 CM/S
VAS LEFT ICA MID PSV: 71.4 CM/S
VAS LEFT ICA PROX EDV: 16.2 CM/S
VAS LEFT ICA PROX PSV: 73.9 CM/S
VAS LEFT VERTEBRAL PSV: 49.7 CM/S
VAS RIGHT CCA MID EDV: 13 CM/S
VAS RIGHT CCA MID PSV: 82 CM/S
VAS RIGHT CCA PROX PSV: 64 CM/S
VAS RIGHT ECA PSV: 71.4 CM/S
VAS RIGHT GSV AT KNEE DIAM: 3.9 MM
VAS RIGHT GSV BK DIST DIAM: 1.2 MM
VAS RIGHT GSV BK MID DIAM: 1.5 MM
VAS RIGHT GSV BK PROX DIAM: 2.2 MM
VAS RIGHT GSV JUNC DIAM: 7.5 MM
VAS RIGHT GSV THIGH DIST DIAM: 5.2 MM
VAS RIGHT GSV THIGH MID DIAM: 4.8 MM
VAS RIGHT GSV THIGH PROX DIAM: 5.8 MM
VAS RIGHT ICA DIST EDV: 14.9 CM/S
VAS RIGHT ICA DIST PSV: 75.2 CM/S
VAS RIGHT ICA MID EDV: 15.5 CM/S
VAS RIGHT ICA MID PSV: 74.6 CM/S
VAS RIGHT ICA PROX EDV: 11.8 CM/S
VAS RIGHT ICA PROX PSV: 57.8 CM/S
VAS RIGHT VERTEBRAL EDV: 11.8 CM/S
VAS RIGHT VERTEBRAL PSV: 50.9 CM/S
WBC # BLD AUTO: 16.5 K/UL (ref 4.8–10.8)

## 2024-11-09 PROCEDURE — 6370000000 HC RX 637 (ALT 250 FOR IP): Performed by: SURGERY

## 2024-11-09 PROCEDURE — 6360000002 HC RX W HCPCS: Performed by: SURGERY

## 2024-11-09 PROCEDURE — 94660 CPAP INITIATION&MGMT: CPT

## 2024-11-09 PROCEDURE — 93970 EXTREMITY STUDY: CPT | Performed by: SURGERY

## 2024-11-09 PROCEDURE — 8010000000 HC HEMODIALYSIS ACUTE INPT

## 2024-11-09 PROCEDURE — 71045 X-RAY EXAM CHEST 1 VIEW: CPT

## 2024-11-09 PROCEDURE — 80048 BASIC METABOLIC PNL TOTAL CA: CPT

## 2024-11-09 PROCEDURE — 94150 VITAL CAPACITY TEST: CPT

## 2024-11-09 PROCEDURE — 85027 COMPLETE CBC AUTOMATED: CPT

## 2024-11-09 PROCEDURE — 85610 PROTHROMBIN TIME: CPT

## 2024-11-09 PROCEDURE — 99024 POSTOP FOLLOW-UP VISIT: CPT | Performed by: SURGERY

## 2024-11-09 PROCEDURE — 94760 N-INVAS EAR/PLS OXIMETRY 1: CPT

## 2024-11-09 PROCEDURE — 82962 GLUCOSE BLOOD TEST: CPT

## 2024-11-09 PROCEDURE — 6360000002 HC RX W HCPCS

## 2024-11-09 PROCEDURE — 83735 ASSAY OF MAGNESIUM: CPT

## 2024-11-09 PROCEDURE — 2580000003 HC RX 258: Performed by: SURGERY

## 2024-11-09 PROCEDURE — 93880 EXTRACRANIAL BILAT STUDY: CPT | Performed by: SURGERY

## 2024-11-09 PROCEDURE — 2000000000 HC ICU R&B

## 2024-11-09 RX ORDER — DEXTROSE MONOHYDRATE 100 MG/ML
INJECTION, SOLUTION INTRAVENOUS CONTINUOUS PRN
Status: DISCONTINUED | OUTPATIENT
Start: 2024-11-09 | End: 2024-11-12 | Stop reason: HOSPADM

## 2024-11-09 RX ORDER — KETOROLAC TROMETHAMINE 30 MG/ML
INJECTION, SOLUTION INTRAMUSCULAR; INTRAVENOUS
Status: COMPLETED
Start: 2024-11-09 | End: 2024-11-09

## 2024-11-09 RX ORDER — INSULIN LISPRO 100 [IU]/ML
0-8 INJECTION, SOLUTION INTRAVENOUS; SUBCUTANEOUS
Status: DISCONTINUED | OUTPATIENT
Start: 2024-11-09 | End: 2024-11-12 | Stop reason: HOSPADM

## 2024-11-09 RX ORDER — METOPROLOL TARTRATE 25 MG/1
25 TABLET, FILM COATED ORAL 2 TIMES DAILY
Status: DISCONTINUED | OUTPATIENT
Start: 2024-11-09 | End: 2024-11-12 | Stop reason: HOSPADM

## 2024-11-09 RX ORDER — KETOROLAC TROMETHAMINE 30 MG/ML
15 INJECTION, SOLUTION INTRAMUSCULAR; INTRAVENOUS EVERY 6 HOURS PRN
Status: DISCONTINUED | OUTPATIENT
Start: 2024-11-09 | End: 2024-11-12 | Stop reason: HOSPADM

## 2024-11-09 RX ORDER — TRAZODONE HYDROCHLORIDE 100 MG/1
100 TABLET ORAL NIGHTLY
Status: DISCONTINUED | OUTPATIENT
Start: 2024-11-09 | End: 2024-11-12 | Stop reason: HOSPADM

## 2024-11-09 RX ADMIN — TRAZODONE HYDROCHLORIDE 100 MG: 100 TABLET ORAL at 21:41

## 2024-11-09 RX ADMIN — FAMOTIDINE 20 MG: 20 TABLET ORAL at 17:18

## 2024-11-09 RX ADMIN — MORPHINE SULFATE 2 MG: 2 INJECTION, SOLUTION INTRAMUSCULAR; INTRAVENOUS at 07:59

## 2024-11-09 RX ADMIN — INSULIN LISPRO 6 UNITS: 100 INJECTION, SOLUTION INTRAVENOUS; SUBCUTANEOUS at 17:18

## 2024-11-09 RX ADMIN — OXYCODONE HYDROCHLORIDE 5 MG: 10 TABLET ORAL at 12:30

## 2024-11-09 RX ADMIN — HEPARIN SODIUM 5000 UNITS: 5000 INJECTION INTRAVENOUS; SUBCUTANEOUS at 04:40

## 2024-11-09 RX ADMIN — CITALOPRAM HYDROBROMIDE 40 MG: 20 TABLET ORAL at 07:57

## 2024-11-09 RX ADMIN — CLOPIDOGREL BISULFATE 75 MG: 75 TABLET ORAL at 07:57

## 2024-11-09 RX ADMIN — METOPROLOL TARTRATE 25 MG: 25 TABLET, FILM COATED ORAL at 20:37

## 2024-11-09 RX ADMIN — OXYCODONE HYDROCHLORIDE 5 MG: 10 TABLET ORAL at 19:43

## 2024-11-09 RX ADMIN — BUPROPION HYDROCHLORIDE 100 MG: 100 TABLET, FILM COATED, EXTENDED RELEASE ORAL at 07:57

## 2024-11-09 RX ADMIN — HEPARIN SODIUM 5000 UNITS: 5000 INJECTION INTRAVENOUS; SUBCUTANEOUS at 14:33

## 2024-11-09 RX ADMIN — CEFAZOLIN 1000 MG: 1 INJECTION, POWDER, FOR SOLUTION INTRAMUSCULAR; INTRAVENOUS at 14:33

## 2024-11-09 RX ADMIN — HEPARIN SODIUM 5000 UNITS: 5000 INJECTION INTRAVENOUS; SUBCUTANEOUS at 21:42

## 2024-11-09 RX ADMIN — SODIUM CHLORIDE, PRESERVATIVE FREE 10 ML: 5 INJECTION INTRAVENOUS at 20:37

## 2024-11-09 RX ADMIN — INSULIN LISPRO 4 UNITS: 100 INJECTION, SOLUTION INTRAVENOUS; SUBCUTANEOUS at 20:36

## 2024-11-09 RX ADMIN — KETOROLAC TROMETHAMINE 15 MG: 30 INJECTION, SOLUTION INTRAMUSCULAR at 08:09

## 2024-11-09 RX ADMIN — OXYCODONE HYDROCHLORIDE 5 MG: 10 TABLET ORAL at 07:57

## 2024-11-09 RX ADMIN — CALCITRIOL CAPSULES 0.25 MCG 0.5 MCG: 0.25 CAPSULE ORAL at 07:58

## 2024-11-09 RX ADMIN — ATORVASTATIN CALCIUM 20 MG: 20 TABLET, FILM COATED ORAL at 19:43

## 2024-11-09 RX ADMIN — SODIUM CHLORIDE, PRESERVATIVE FREE 10 ML: 5 INJECTION INTRAVENOUS at 07:59

## 2024-11-09 RX ADMIN — ALLOPURINOL 100 MG: 100 TABLET ORAL at 07:58

## 2024-11-09 RX ADMIN — INSULIN GLARGINE 19 UNITS: 100 INJECTION, SOLUTION SUBCUTANEOUS at 20:37

## 2024-11-09 ASSESSMENT — PAIN - FUNCTIONAL ASSESSMENT: PAIN_FUNCTIONAL_ASSESSMENT: ACTIVITIES ARE NOT PREVENTED

## 2024-11-09 ASSESSMENT — PAIN SCALES - GENERAL
PAINLEVEL_OUTOF10: 6
PAINLEVEL_OUTOF10: 3
PAINLEVEL_OUTOF10: 6
PAINLEVEL_OUTOF10: 5
PAINLEVEL_OUTOF10: 0

## 2024-11-09 ASSESSMENT — PAIN DESCRIPTION - ORIENTATION: ORIENTATION: LEFT

## 2024-11-09 ASSESSMENT — PAIN DESCRIPTION - DESCRIPTORS: DESCRIPTORS: ACHING

## 2024-11-09 ASSESSMENT — PAIN DESCRIPTION - LOCATION: LOCATION: RIB CAGE

## 2024-11-09 ASSESSMENT — PAIN SCALES - WONG BAKER: WONGBAKER_NUMERICALRESPONSE: NO HURT

## 2024-11-09 NOTE — PROGRESS NOTES
Renal Progress Note    Thank you to requesting provider: Dr Morataya, for asking us to see Sara Pardo    Reason for consultation: ESRD management    Chief Complaint: Coronary artery disease    History of Presenting Illness      Patient is a 64-year-old pleasant woman with a past medical history of morbid obesity, hypertension, type 2 diabetes, history of DVT status post IVC filter, and coronary artery disease.  She was given a murmur and underwent cardiac workup.  She was found with two-vessel disease not amenable to PCI.  Patient was seen by CT surgeon and non invasive CABG technique was offered.  Renal service was consulted to manage her ESRD.  Patient has last received home hemodialysis on November 5.  She offers no other concerns or complaints.  She denies chest pain.  On  11/8, she underwent robotic assisted CABG x 2 vessels with bilateral mammary arteries. Patient is seen in post-operative day 1. She was awake, on no pressors. Her chest tube was removed. She was receving dialysis    Dialysis   Pt was seen on RRT  Modality: Hemodialysis  Access: Arterial Venous Fistula  Location: left upper  QB: 450  QD: 700  UF: 2.5 liters      Past Medical/Surgical History      Active Ambulatory Problems     Diagnosis Date Noted    Morbid obesity 09/15/2011    GERI on CPAP 02/26/2017    Moderate major depression (HCC) 02/26/2017    Hypertriglyceridemia 02/26/2017    Anxiety 02/26/2017    Vitamin D deficiency 02/26/2017    Hypocalcemia 02/26/2017    Allergic rhinitis with postnasal drip 04/06/2021    B12 deficiency 10/17/2016    History of DVT (deep vein thrombosis) 10/09/2019    ESRD on peritoneal dialysis (HCC) 07/24/2018    Hypertension 08/10/2021    Pyelonephritis 06/25/2022    Colitis 10/22/2022    Great toe pain, left 01/11/2023    Anemia in end-stage renal disease (HCC) 02/09/2023    Extreme obesity 03/30/2023    Non-restorative sleep 03/30/2023    Right trigeminal neuralgia 04/06/2023    Acquired hypothyroidism

## 2024-11-09 NOTE — DIALYSIS
Pt tolerated dialysis tx well without complications. 2 L removed. Pt stable.      [facility]  DIALYSIS TREATMENT SUMMARY      Note: Consult with the attending physician for patient treatment orders, this document is not a physician order.      Patient Information   Patient Sara Pardo   Date of Birth April 05, 1960   Chart Number [id number]   Location [facility] MRN [id number]   Gender Female   SSN (last 4) [id number]     Treatment Information   Treatment Type Hemodialysis   Treatment Id [id number]   Start Time November 09, 2024 08:39   End Time November 09, 2024 12:12   Acutal Duration 03:33     Treatment Balances   Total Saline Administered 500   Net Fluid Balance 2000    Hemodialysis Orders   Therapy Standard   Orders Verified Time 11/09/2024 08:00    Date Verified 11/09/2024   Duration 3:30   Isolated UF/Bypass No   BFR (mL) 450   DFR (mL) 700   Dialyzer Type OPTIFLUX 160NR   UF Order UF Range   UF Range (mL) 2000 - 3000   With BP Parameters Yes   As Tolerated Yes   Crit-Line used No   Heparin Initial Units Bolus No   Heparin IV Maintenance Bolus No   Heparin IV Infusion No   Potassium (mEq/L) 3   Calcium (mEq/L) 2.5   Bicarb (mg/dL) 35   Sodium (mEq/L) 137   Additional Orders as tolerated to keep sbp greater than 90   Clinician Jessica Lema RN    Dialysis Access   Access Type AV Access   AV Access   Access Location Upper Arm - L   Needle 15g   Bruit Yes   Thrill Yes   Notes LEFT UPPER ARM AV FISTULA, GOOO THRILL AND BRUIT, SITE CLEAR      Vitals   Pre-Treatment Vitals   Time Is BP being recorded? Pre BP Map BP Method Pulse RR Temp How was Weight Obtained? Pre Weight Previous Dry Weight Previous Post Weight Metric Target Fluid Removal (mL) Dialysate Confirmed Clinician    11/09/2024 08:35 BP/Map 131/47 (75) Invasive 86 18 98.4 How Obtained: Reported Floor Weight 132.5   Kgs 3000 Yes Jessica Lema RN    Comments: pt is a & o x 3. hemodynamically stable. ICU RN at

## 2024-11-09 NOTE — PROGRESS NOTES
Progress Note    2024 5:03 AM          POD#   1    Subjective:  Ms. Pardo required morphine 10 mg for pain since the OR. Precedex d/c'ed.  PULSE OXIMETRY RANGE: SpO2  Av.8 %  Min: 89 %  Max: 100 %  SUPPLEMENTAL O2: O2 Flow Rate (L/min): 5 L/min   Vital Signs: BP (!) 120/45   Pulse 81   Temp 98.4 °F (36.9 °C) (Temporal)   Resp 22   Wt 132.5 kg (292 lb 1.6 oz)   LMP 2000   SpO2 96%   BMI 47.87 kg/m²    Temperature Range:   Temp: 98.4 °F (36.9 °C)   Temp  Av.3 °F (36.8 °C)  Min: 98 °F (36.7 °C)  Max: 98.4 °F (36.9 °C)                   Rhythm: normal sinus rhythm    Labs:   ABG:    Lab Results   Component Value Date/Time    PHART 7.320 2024 04:11 PM    PO2ART 90.0 2024 04:11 PM    TWA4JSG 43.0 2024 04:11 PM    R7KSJSGR 95.5 2024 04:11 PM    SLD4LPS 25 2024 12:39 PM    FEM1AFW 22.2 2024 04:11 PM    BEART -3.8 2024 04:11 PM     CBC:   Recent Labs     24  1330 24  0325   WBC 14.9* 14.4* 16.5*   HGB 11.2* 11.7* 11.6*   HCT 36.1* 38.5 37.8   .7* 103.5* 104.7*    221 194     BMP:   Recent Labs     24  1239 24  1330 24  1330 24  1611 24  0325   NA  --  137  --   --   --  137   K  --  4.5  4.3   < > 4.6 4.9 4.8   CL  --  96*  --   --   --  96*   CO2   --   --   --  20*   BUN  --  46*  --   --   --  55*   CREATININE 7.5* 8.8*  --   --   --  9.1*    < > = values in this interval not displayed.     PT/INR:   Recent Labs     24  1330 24  0325   PROTIME 15.1* 14.1   INR 1.22* 1.12     APTT:   Recent Labs     24  1330   APTT 29.6     Chest X-Ray:  Left lung well expanded, no  infiltrates or effusions   CT:  I/O last 3 completed shifts:  In: 1796.5 [I.V.:1296.5]  Out: 3423 [Urine:114; Chest Tube:109]    Minimal output since insertion  Air Leak:  No air leak  I/O last 3 completed shifts:  In: 1796.5 [I.V.:1296.5]  Out: 3423 [Urine:114; Chest  ambulate.      Patient Active Problem List   Diagnosis    Morbid obesity    GERI on CPAP    Moderate major depression (HCC)    Hypertriglyceridemia    Anxiety    Vitamin D deficiency    Hypocalcemia    Allergic rhinitis with postnasal drip    B12 deficiency    History of DVT (deep vein thrombosis)    ESRD on peritoneal dialysis ([facility])    Hypertension    Pyelonephritis    Colitis    Great toe pain, left    Anemia in end-stage renal disease (HCC)    Extreme obesity    Non-restorative sleep    Right trigeminal neuralgia    Acquired hypothyroidism    Anaphylactic shock, unspecified, initial encounter    Chest pain, unspecified    Gastric polyps    Hyperkalemia    Nonobstructive reflux-associated chronic pyelonephritis    Left carpal tunnel syndrome    Right carpal tunnel syndrome    Secondary hyperparathyroidism of renal origin (HCC)    Type 2 diabetes mellitus with end-stage renal disease ([facility])    Hypersomnia    Nonrheumatic aortic valve insufficiency    Mitral stenosis    Leg cramps    Dysuria    Positive cardiac stress test    Coronary artery disease    CAD in native artery       Plan continue suction for now      MD Niko Jaramillo MD

## 2024-11-09 NOTE — OP NOTE
[id number] [place] [address]                            OPERATIVE REPORT      PATIENT NAME: NILSA LICONA                : 1960  MED REC NO: [id number]                          ROOM: 0143  ACCOUNT NO: [id number]                       ADMIT DATE: 2024  PROVIDER: Niko Morataya MD      DATE OF PROCEDURE:  2024    SURGEON:  Niko Morataya MD    PREOPERATIVE DIAGNOSIS:  Multivessel coronary artery disease.    POSTOPERATIVE DIAGNOSIS:  Multivessel coronary artery disease.    PROCEDURE:  Off pump minimally invasive robotic-assisted coronary artery bypass grafting x2 using a right internal mammary artery onto the first diagonal and left internal mammary artery onto the left anterior descending.    ASSISTANT:  Sharmin Leblanc.    ESTIMATED BLOOD LOSS:  100 mL.    HISTORY:  The patient is a 64-year-old female who had chest discomfort, underwent a cardiology workup including a stress test followed by a left heart catheterization and this showed LAD and diagonal disease.  It was not amenable to PCI; and so therefore, the patient was referred for cardiac surgery.  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  However, she was deemed to be an appropriate candidate for a minimally invasive robotic assisted surgery, which would base her grafts off the bilateral mammaries without a sternotomy, this did not pose sternal healing risk; and so therefore, this was felt to be the ideal grafting strategy for the patient's coronary disease.  She understood the risks and benefits of the procedure and agreed to proceed.    DESCRIPTION OF PROCEDURE:  The patient underwent general endotracheal anesthesia which she tolerated without any hemodynamic difficulties.  A double-lumen endotracheal tube was placed.  The left lung was deflated.  She tolerated one-lung ventilation without any desaturation at any point.  She

## 2024-11-10 ENCOUNTER — APPOINTMENT (OUTPATIENT)
Dept: GENERAL RADIOLOGY | Age: 64
End: 2024-11-10
Attending: SURGERY
Payer: MEDICARE

## 2024-11-10 LAB
ANION GAP SERPL CALCULATED.3IONS-SCNC: 15 MMOL/L (ref 7–19)
BUN SERPL-MCNC: 36 MG/DL (ref 8–23)
CALCIUM SERPL-MCNC: 9.3 MG/DL (ref 8.8–10.2)
CHLORIDE SERPL-SCNC: 91 MMOL/L (ref 98–111)
CO2 SERPL-SCNC: 26 MMOL/L (ref 22–29)
CREAT SERPL-MCNC: 6.5 MG/DL (ref 0.5–0.9)
ERYTHROCYTE [DISTWIDTH] IN BLOOD BY AUTOMATED COUNT: 16.2 % (ref 11.5–14.5)
GLUCOSE BLD-MCNC: 234 MG/DL (ref 70–99)
GLUCOSE BLD-MCNC: 267 MG/DL (ref 70–99)
GLUCOSE BLD-MCNC: 298 MG/DL (ref 70–99)
GLUCOSE BLD-MCNC: 301 MG/DL (ref 70–99)
GLUCOSE BLD-MCNC: 349 MG/DL (ref 70–99)
GLUCOSE SERPL-MCNC: 290 MG/DL (ref 70–99)
HBA1C MFR BLD: 6.5 % (ref 4–5.6)
HCT VFR BLD AUTO: 33.2 % (ref 37–47)
HGB BLD-MCNC: 10.2 G/DL (ref 12–16)
INR PPP: 1.23 (ref 0.88–1.18)
MAGNESIUM SERPL-MCNC: 2.1 MG/DL (ref 1.6–2.4)
MCH RBC QN AUTO: 31.8 PG (ref 27–31)
MCHC RBC AUTO-ENTMCNC: 30.7 G/DL (ref 33–37)
MCV RBC AUTO: 103.4 FL (ref 81–99)
PERFORMED ON: ABNORMAL
PLATELET # BLD AUTO: 178 K/UL (ref 130–400)
PMV BLD AUTO: 9.5 FL (ref 9.4–12.3)
POTASSIUM SERPL-SCNC: 4.3 MMOL/L (ref 3.5–5)
PROTHROMBIN TIME: 15.2 SEC (ref 12–14.6)
RBC # BLD AUTO: 3.21 M/UL (ref 4.2–5.4)
SODIUM SERPL-SCNC: 132 MMOL/L (ref 136–145)
WBC # BLD AUTO: 10.5 K/UL (ref 4.8–10.8)

## 2024-11-10 PROCEDURE — 83735 ASSAY OF MAGNESIUM: CPT

## 2024-11-10 PROCEDURE — 85610 PROTHROMBIN TIME: CPT

## 2024-11-10 PROCEDURE — 71045 X-RAY EXAM CHEST 1 VIEW: CPT

## 2024-11-10 PROCEDURE — 94760 N-INVAS EAR/PLS OXIMETRY 1: CPT

## 2024-11-10 PROCEDURE — 2580000003 HC RX 258: Performed by: SURGERY

## 2024-11-10 PROCEDURE — 97162 PT EVAL MOD COMPLEX 30 MIN: CPT

## 2024-11-10 PROCEDURE — 99024 POSTOP FOLLOW-UP VISIT: CPT | Performed by: SURGERY

## 2024-11-10 PROCEDURE — 94660 CPAP INITIATION&MGMT: CPT

## 2024-11-10 PROCEDURE — 6370000000 HC RX 637 (ALT 250 FOR IP): Performed by: SURGERY

## 2024-11-10 PROCEDURE — 83036 HEMOGLOBIN GLYCOSYLATED A1C: CPT

## 2024-11-10 PROCEDURE — 2140000000 HC CCU INTERMEDIATE R&B

## 2024-11-10 PROCEDURE — 80048 BASIC METABOLIC PNL TOTAL CA: CPT

## 2024-11-10 PROCEDURE — 82962 GLUCOSE BLOOD TEST: CPT

## 2024-11-10 PROCEDURE — 97110 THERAPEUTIC EXERCISES: CPT

## 2024-11-10 PROCEDURE — 6360000002 HC RX W HCPCS: Performed by: SURGERY

## 2024-11-10 PROCEDURE — 85027 COMPLETE CBC AUTOMATED: CPT

## 2024-11-10 RX ORDER — SEVELAMER CARBONATE 800 MG/1
800 TABLET, FILM COATED ORAL
Status: DISCONTINUED | OUTPATIENT
Start: 2024-11-10 | End: 2024-11-10

## 2024-11-10 RX ORDER — SEVELAMER CARBONATE 800 MG/1
3200 TABLET, FILM COATED ORAL
Status: DISCONTINUED | OUTPATIENT
Start: 2024-11-10 | End: 2024-11-12 | Stop reason: HOSPADM

## 2024-11-10 RX ADMIN — METOPROLOL TARTRATE 25 MG: 25 TABLET, FILM COATED ORAL at 21:17

## 2024-11-10 RX ADMIN — INSULIN LISPRO 2 UNITS: 100 INJECTION, SOLUTION INTRAVENOUS; SUBCUTANEOUS at 08:08

## 2024-11-10 RX ADMIN — CALCITRIOL CAPSULES 0.25 MCG 0.5 MCG: 0.25 CAPSULE ORAL at 08:07

## 2024-11-10 RX ADMIN — SODIUM CHLORIDE, PRESERVATIVE FREE 10 ML: 5 INJECTION INTRAVENOUS at 21:21

## 2024-11-10 RX ADMIN — SEVELAMER CARBONATE 3200 MG: 800 TABLET, FILM COATED ORAL at 16:35

## 2024-11-10 RX ADMIN — CEFAZOLIN 1000 MG: 1 INJECTION, POWDER, FOR SOLUTION INTRAMUSCULAR; INTRAVENOUS at 11:57

## 2024-11-10 RX ADMIN — HEPARIN SODIUM 5000 UNITS: 5000 INJECTION INTRAVENOUS; SUBCUTANEOUS at 21:16

## 2024-11-10 RX ADMIN — CLOPIDOGREL BISULFATE 75 MG: 75 TABLET ORAL at 08:07

## 2024-11-10 RX ADMIN — ATORVASTATIN CALCIUM 20 MG: 20 TABLET, FILM COATED ORAL at 21:17

## 2024-11-10 RX ADMIN — SEVELAMER CARBONATE 3200 MG: 800 TABLET, FILM COATED ORAL at 11:57

## 2024-11-10 RX ADMIN — ALLOPURINOL 100 MG: 100 TABLET ORAL at 08:07

## 2024-11-10 RX ADMIN — INSULIN GLARGINE 19 UNITS: 100 INJECTION, SOLUTION SUBCUTANEOUS at 21:17

## 2024-11-10 RX ADMIN — TRAZODONE HYDROCHLORIDE 100 MG: 100 TABLET ORAL at 21:17

## 2024-11-10 RX ADMIN — INSULIN LISPRO 6 UNITS: 100 INJECTION, SOLUTION INTRAVENOUS; SUBCUTANEOUS at 21:18

## 2024-11-10 RX ADMIN — CITALOPRAM HYDROBROMIDE 40 MG: 20 TABLET ORAL at 08:07

## 2024-11-10 RX ADMIN — SODIUM CHLORIDE, PRESERVATIVE FREE 10 ML: 5 INJECTION INTRAVENOUS at 08:08

## 2024-11-10 RX ADMIN — METOPROLOL TARTRATE 25 MG: 25 TABLET, FILM COATED ORAL at 08:07

## 2024-11-10 RX ADMIN — HEPARIN SODIUM 5000 UNITS: 5000 INJECTION INTRAVENOUS; SUBCUTANEOUS at 05:56

## 2024-11-10 RX ADMIN — SEVELAMER CARBONATE 800 MG: 800 TABLET, FILM COATED ORAL at 08:07

## 2024-11-10 RX ADMIN — OXYCODONE HYDROCHLORIDE 5 MG: 10 TABLET ORAL at 21:17

## 2024-11-10 RX ADMIN — LEVOTHYROXINE SODIUM 125 MCG: 125 TABLET ORAL at 05:58

## 2024-11-10 RX ADMIN — FAMOTIDINE 20 MG: 20 TABLET ORAL at 16:35

## 2024-11-10 RX ADMIN — BUPROPION HYDROCHLORIDE 100 MG: 100 TABLET, FILM COATED, EXTENDED RELEASE ORAL at 08:07

## 2024-11-10 RX ADMIN — HEPARIN SODIUM 5000 UNITS: 5000 INJECTION INTRAVENOUS; SUBCUTANEOUS at 14:59

## 2024-11-10 RX ADMIN — INSULIN LISPRO 6 UNITS: 100 INJECTION, SOLUTION INTRAVENOUS; SUBCUTANEOUS at 11:57

## 2024-11-10 RX ADMIN — INSULIN LISPRO 4 UNITS: 100 INJECTION, SOLUTION INTRAVENOUS; SUBCUTANEOUS at 16:35

## 2024-11-10 ASSESSMENT — PAIN SCALES - GENERAL
PAINLEVEL_OUTOF10: 2
PAINLEVEL_OUTOF10: 0
PAINLEVEL_OUTOF10: 4

## 2024-11-10 ASSESSMENT — PAIN SCALES - WONG BAKER: WONGBAKER_NUMERICALRESPONSE: HURTS A LITTLE BIT

## 2024-11-10 NOTE — PLAN OF CARE
Problem: Chronic Conditions and Co-morbidities  Goal: Patient's chronic conditions and co-morbidity symptoms are monitored and maintained or improved  11/10/2024 1334 by Jovanna De Leon RN  Outcome: Progressing  11/10/2024 0438 by Rory Tapia RN  Outcome: Progressing     Problem: Discharge Planning  Goal: Discharge to home or other facility with appropriate resources  11/10/2024 1334 by Jovanna De Leon RN  Outcome: Progressing  11/10/2024 0438 by Rory Tapia RN  Outcome: Progressing     Problem: Spiritual Struggle  Goal: Verbalizes spiritual struggle  11/10/2024 1334 by Jovanna De Leon RN  Outcome: Progressing  11/10/2024 0438 by Rory Tapia RN  Outcome: Progressing  Goal: Gains new understanding/perception  11/10/2024 1334 by Jovanna De Leon RN  Outcome: Progressing  11/10/2024 0438 by Rory Tapia RN  Outcome: Progressing  Goal: Growing sense of peace/hope  11/10/2024 1334 by Jovanna De Leon RN  Outcome: Progressing  11/10/2024 0438 by Rory Tapia RN  Outcome: Progressing  Goal: Explore resources for additional follow up, post discharge  Outcome: Progressing     Problem: Emotional Distress  Goal: Verbalization of thoughts and feelings  Outcome: Progressing  Goal: Reduce evidence of anxiety/worry/anger  Outcome: Progressing  Goal: Identifies/restores coping resources/skills  Outcome: Progressing  Goal: Growing sense of peace/hope  Outcome: Progressing  Goal: Explore resources for additional follow up, post discharge  Outcome: Progressing     Problem: Life Adjustment  Goal: Verbalization of feelings regarding change/loss  Outcome: Progressing  Goal: Finding meaning/purpose in the midst of illness/suffering  Outcome: Progressing  Goal: Identifies/restores coping resources/skills  Outcome: Progressing  Goal: Growing sense of peace/hope  Outcome: Progressing  Goal: Explore resources for additional follow up, post discharge  Outcome: Progressing     Problem: Support with Decision-Making  Goal:

## 2024-11-10 NOTE — PROGRESS NOTES
Physical Therapy  Facility/Department: [facility] ICU  Physical Therapy Initial Assessment    Name: Sara Pardo  : 1960  MRN: [id number]  Date of Service: 11/10/2024    Discharge Recommendations:  Continue to assess pending progress, 24 hour supervision or assist, Patient would benefit from continued therapy after discharge          Patient Diagnosis(es): The primary encounter diagnosis was CAD in native artery. A diagnosis of Coronary artery disease involving native coronary artery of native heart without angina pectoris was also pertinent to this visit.  Past Medical History:  has a past medical history of Acute hemodialysis encounter ([facility]), Acute hypoxemic respiratory failure, Anemia in end-stage renal disease ([facility]), Anxiety, Arthritis, Brain condition, CHF (congestive heart failure) ([facility]), CKD (chronic kidney disease), Colitis, Colon polyp, Deep vein thrombosis ([facility]), Depression, Embolism - blood clot, Hemodialysis patient ([facility]), Hx of blood clots, Hyperlipidemia, Hypertension, Hypertension associated with diabetes ([facility]), Obesity, morbid, BMI 50 or higher, Sleep apnea, Thyroid disease, and Type II or unspecified type diabetes mellitus without mention of complication, not stated as uncontrolled.  Past Surgical History:  has a past surgical history that includes Tonsillectomy and adenoidectomy; polypectomy; Dilation and curettage of uterus (); Cholecystectomy, laparoscopic (); Rectal surgery (); Hysterectomy (10/17/2018); Dialysis fistula creation (Left, 2021); vascular surgery (2021); vascular surgery (2021); Dialysis fistula creation (Left, 2021); Dialysis Catheter Insertion (N/A, 2021); Fistulagram (Left, 2022); IR GUIDED IVC FILTER PLACEMENT; and Coronary artery bypass graft (N/A, 2024).    Assessment  Body Structures, Functions, Activity Limitations Requiring Skilled Therapeutic Intervention: Decreased functional mobility ;Decreased ADL status;Decreased

## 2024-11-10 NOTE — PROGRESS NOTES
Sara Pardo transferred to 728 from [facility] via wheelchair.    Reason for transfer: lower level of care. Per attending physician's order. Ambulated well in ICU. VSS. No active ICU needs at this time.   Explained reason for transfer to Patient and Family.   Belongings: Glasses, cup, incentive, bipap with patient at bedside .   Soft chart transferred with patient: Yes.  Telemetry box number [id number] transferred with patient: yes.  Report given to: Julita Mei RN, at bedside.      Electronically signed by Jovanna De Leon RN on 11/10/2024 at 3:41 PM

## 2024-11-10 NOTE — PROGRESS NOTES
Progress Note    11/10/2024 6:18 AM          POD#   2    Subjective:  Ms. Pardo required three doses of oxycodone for pain over the past day.  PULSE OXIMETRY RANGE: SpO2  Av.7 %  Min: 89 %  Max: 97 %  SUPPLEMENTAL O2: O2 Flow Rate (L/min): 0 L/min   Vital Signs: BP (!) 106/50   Pulse 71   Temp 98.2 °F (36.8 °C) (Temporal)   Resp 16   Wt 132.1 kg (291 lb 3.2 oz)   LMP 2000   SpO2 91%   BMI 47.72 kg/m²    Temperature Range:   Temp: 98.2 °F (36.8 °C)   Temp  Av.1 °F (36.7 °C)  Min: 97.2 °F (36.2 °C)  Max: 98.5 °F (36.9 °C)                   Rhythm: normal sinus rhythm    Labs:   ABG:    Lab Results   Component Value Date/Time    PHART 7.320 2024 04:11 PM    PO2ART 90.0 2024 04:11 PM    FPX6FGM 43.0 2024 04:11 PM    G3QWQCLQ 95.5 2024 04:11 PM    FHC8CPD 25 2024 12:39 PM    CMN3UNJ 22.2 2024 04:11 PM    BEART -3.8 2024 04:11 PM     CBC:   Recent Labs     11/08/24  1945 11/09/24  0325 11/10/24  0155   WBC 14.4* 16.5* 10.5   HGB 11.7* 11.6* 10.2*   HCT 38.5 37.8 33.2*   .5* 104.7* 103.4*    194 178     BMP:   Recent Labs     24  1330 24  1611 11/08/24  1945 11/09/24  0325 11/10/24  0155     --   --  137 132*   K 4.5  4.3   < > 4.9 4.8 4.3   CL 96*  --   --  96* 91*   CO2 23  --   --  20* 26   BUN 46*  --   --  55* 36*   CREATININE 8.8*  --   --  9.1* 6.5*    < > = values in this interval not displayed.     PT/INR:   Recent Labs     24  1330 24  0325 11/10/24  0155   PROTIME 15.1* 14.1 15.2*   INR 1.22* 1.12 1.23*     APTT:   Recent Labs     24  1330   APTT 29.6     Chest X-Ray:  Left lung well expanded, no ptx or effusion after chest tube removal  CT:  I/O last 3 completed shifts:  In: .4 [P.O.:150; I.V.:1343.4]  Out: 2938 [Urine:189; Chest Tube:249]    I/O last 3 completed shifts:  In: .4 [P.O.:150; I.V.:1343.4]  Out: 2938 [Urine:189; Chest Tube:249]  Scheduled Meds:    metoprolol tartrate  25 mg

## 2024-11-10 NOTE — PROGRESS NOTES
PT transfer to room 728, VSS. This nurse will be assuming care. Patient oriented to room, call light within reach. No needs expressed at this time.

## 2024-11-10 NOTE — PROGRESS NOTES
Called & spoke to pt  - Reviewed Stelara drug lvls 3.5 w/ room to go up on dose to improve this given continued flare; will start auth on this  - Pt to submit stool calprotectin & C. Diff sample to local [facility]; orders placed  - Plan for ASAP flex sig w/ rectal swab  - All questions answered   Renal Progress Note    Thank you to requesting provider: Dr Morataya, for asking us to see Sara Pardo    Reason for consultation: ESRD management    Chief Complaint: Coronary artery disease    History of Presenting Illness      Patient is a 64-year-old pleasant woman with a past medical history of morbid obesity, hypertension, type 2 diabetes, history of DVT status post IVC filter, and coronary artery disease.  She was given a murmur and underwent cardiac workup. She was found with two-vessel disease not amenable to PCI.  Patient was seen by CT surgeon and non invasive CABG technique was offered.  Renal service was consulted to manage her ESRD.  Patient has last received home hemodialysis on November 5.  She offers no other concerns or complaints.  She denies chest pain.  On  11/8, she underwent robotic assisted CABG x 2 vessels with bilateral mammary arteries.She is awake, on no pressors. Her chest tube was removed. She is s/p dialysis on 11/9.     Past Medical/Surgical History      Active Ambulatory Problems     Diagnosis Date Noted    Morbid obesity 09/15/2011    GERI on CPAP 02/26/2017    Moderate major depression (HCC) 02/26/2017    Hypertriglyceridemia 02/26/2017    Anxiety 02/26/2017    Vitamin D deficiency 02/26/2017    Hypocalcemia 02/26/2017    Allergic rhinitis with postnasal drip 04/06/2021    B12 deficiency 10/17/2016    History of DVT (deep vein thrombosis) 10/09/2019    ESRD on peritoneal dialysis (HCC) 07/24/2018    Hypertension 08/10/2021    Pyelonephritis 06/25/2022    Colitis 10/22/2022    Great toe pain, left 01/11/2023    Anemia in end-stage renal disease (HCC) 02/09/2023    Extreme obesity 03/30/2023    Non-restorative sleep 03/30/2023    Right trigeminal neuralgia 04/06/2023    Acquired hypothyroidism 02/06/2017    Anaphylactic shock, unspecified, initial encounter 08/13/2021    Chest pain, unspecified 08/13/2021    Gastric polyps 04/11/2023    Hyperkalemia 08/13/2021    Nonobstructive

## 2024-11-10 NOTE — PLAN OF CARE
1802 by Jovanna De Leon RN  Outcome: Progressing  Goal: Identifies/restores coping resources/skills  11/9/2024 1802 by Jovanna De Leon RN  Outcome: Progressing  Goal: Growing sense of peace/hope  11/9/2024 1802 by Jovanna De Leon RN  Outcome: Progressing  Goal: Explore resources for additional follow up, post discharge  11/9/2024 1802 by Jovanna De Leon RN  Outcome: Progressing     Problem: Support with Decision-Making  Goal: Verbalization of thoughts/feelings regarding decision  11/9/2024 1802 by Jovanna De Leon RN  Outcome: Progressing  Goal: Movement towards resolution of decision  11/9/2024 1802 by Jovanna De Leon RN  Outcome: Progressing  Goal: Identifies/restores coping resources/skills  11/9/2024 1802 by Jovanna De Leon RN  Outcome: Progressing  Goal: Explore resources for additional follow up, post discharge  11/9/2024 1802 by Jovanna De Leon RN  Outcome: Progressing     Problem: Unresolved Conflict/Relationships  Goal: Verbalization of thoughts and feelings  11/9/2024 1802 by Jovanna De Leon RN  Outcome: Progressing  Goal: Movement towards resolution of conflict/strained relationship  11/9/2024 1802 by Jovanna De Leon RN  Outcome: Progressing  Goal: Explore resources for additional follow up, post discharge  11/9/2024 1802 by Jovanna De Leon RN  Outcome: Progressing     Problem: Sacramental/[employer]ybeterian Needs  Goal: Provide for sacramental/Yazidi needs as appropriate,per patient/family request  11/9/2024 1802 by Jovanna De Leon RN  Outcome: Progressing     Problem: Grief  Goal: Verbalizaton of thoughts and feelings regarding loss  11/9/2024 1802 by Jovanna De Leon RN  Outcome: Progressing  Goal: Establishing/maintaining support systems  11/9/2024 1802 by Jovanna De Leon RN  Outcome: Progressing  Goal: Growing sense of grief as a process and identify ways of coping  11/9/2024 1802 by Jovanna De Leon RN  Outcome: Progressing  Goal: Explore resources for additional follow up, post  discharge  11/9/2024 1802 by Jovanna De Leon RN  Outcome: Progressing     Problem: End of Life  Goal: Verbalization of thoughts and feelings regarding death/dying  11/9/2024 1802 by Jovanna De Leon RN  Outcome: Progressing  Goal: Address spiritual needs  11/9/2024 1802 by Jovanna De Leon RN  Outcome: Progressing  Goal: Acknowledge a sense of peace  11/9/2024 1802 by Jovanna De Leon RN  Outcome: Progressing  Goal: Identifies/restores coping resources/skills  11/9/2024 1802 by Jovanna eD Leon RN  Outcome: Progressing  Goal: Death with dignity  11/9/2024 1802 by Jovanna De Leon RN  Outcome: Progressing  Goal: Explore resources for additional follow up, post discharge  11/9/2024 1802 by Jovanna De Leon RN  Outcome: Progressing     Problem: Safety - Adult  Goal: Free from fall injury  11/9/2024 1802 by Jovanna De Leon RN  Outcome: Progressing     Problem: Skin/Tissue Integrity  Goal: Absence of new skin breakdown  Description: 1.  Monitor for areas of redness and/or skin breakdown  2.  Assess vascular access sites hourly  3.  Every 4-6 hours minimum:  Change oxygen saturation probe site  4.  Every 4-6 hours:  If on nasal continuous positive airway pressure, respiratory therapy assess nares and determine need for appliance change or resting period.  11/9/2024 1802 by Jovanna De Leon RN  Outcome: Progressing     Problem: ABCDS Injury Assessment  Goal: Absence of physical injury  11/9/2024 1802 by Jovanna De Leon RN  Outcome: Progressing

## 2024-11-10 NOTE — PLAN OF CARE
Problem: Chronic Conditions and Co-morbidities  Goal: Patient's chronic conditions and co-morbidity symptoms are monitored and maintained or improved  Outcome: Progressing     Problem: Discharge Planning  Goal: Discharge to home or other facility with appropriate resources  Outcome: Progressing     Problem: Spiritual Struggle  Goal: Verbalizes spiritual struggle  Outcome: Progressing  Goal: Gains new understanding/perception  Outcome: Progressing  Goal: Growing sense of peace/hope  Outcome: Progressing  Goal: Explore resources for additional follow up, post discharge  Outcome: Progressing     Problem: Emotional Distress  Goal: Verbalization of thoughts and feelings  Outcome: Progressing  Goal: Reduce evidence of anxiety/worry/anger  Outcome: Progressing  Goal: Identifies/restores coping resources/skills  Outcome: Progressing  Goal: Growing sense of peace/hope  Outcome: Progressing  Goal: Explore resources for additional follow up, post discharge  Outcome: Progressing     Problem: Life Adjustment  Goal: Verbalization of feelings regarding change/loss  Outcome: Progressing  Goal: Finding meaning/purpose in the midst of illness/suffering  Outcome: Progressing  Goal: Identifies/restores coping resources/skills  Outcome: Progressing  Goal: Growing sense of peace/hope  Outcome: Progressing  Goal: Explore resources for additional follow up, post discharge  Outcome: Progressing     Problem: Support with Decision-Making  Goal: Verbalization of thoughts/feelings regarding decision  Outcome: Progressing  Goal: Movement towards resolution of decision  Outcome: Progressing  Goal: Identifies/restores coping resources/skills  Outcome: Progressing  Goal: Explore resources for additional follow up, post discharge  Outcome: Progressing     Problem: Unresolved Conflict/Relationships  Goal: Verbalization of thoughts and feelings  Outcome: Progressing  Goal: Movement towards resolution of conflict/strained relationship  Outcome:

## 2024-11-11 ENCOUNTER — APPOINTMENT (OUTPATIENT)
Dept: GENERAL RADIOLOGY | Age: 64
End: 2024-11-11
Attending: SURGERY
Payer: MEDICARE

## 2024-11-11 LAB
ALBUMIN SERPL-MCNC: 2.6 G/DL (ref 3.5–5.2)
ALP SERPL-CCNC: 61 U/L (ref 35–104)
ALT SERPL-CCNC: <5 U/L (ref 5–33)
ANION GAP SERPL CALCULATED.3IONS-SCNC: 16 MMOL/L (ref 7–19)
AST SERPL-CCNC: 6 U/L (ref 5–32)
BILIRUB SERPL-MCNC: 0.2 MG/DL (ref 0.2–1.2)
BUN SERPL-MCNC: 59 MG/DL (ref 8–23)
CALCIUM SERPL-MCNC: 9.4 MG/DL (ref 8.8–10.2)
CHLORIDE SERPL-SCNC: 93 MMOL/L (ref 98–111)
CO2 SERPL-SCNC: 24 MMOL/L (ref 22–29)
CREAT SERPL-MCNC: 8.9 MG/DL (ref 0.5–0.9)
ERYTHROCYTE [DISTWIDTH] IN BLOOD BY AUTOMATED COUNT: 16.3 % (ref 11.5–14.5)
GLUCOSE BLD-MCNC: 201 MG/DL (ref 70–99)
GLUCOSE BLD-MCNC: 233 MG/DL (ref 70–99)
GLUCOSE BLD-MCNC: 246 MG/DL (ref 70–99)
GLUCOSE BLD-MCNC: 263 MG/DL (ref 70–99)
GLUCOSE BLD-MCNC: 290 MG/DL (ref 70–99)
GLUCOSE SERPL-MCNC: 200 MG/DL (ref 70–99)
HCT VFR BLD AUTO: 34 % (ref 37–47)
HGB BLD-MCNC: 10.1 G/DL (ref 12–16)
MCH RBC QN AUTO: 31.5 PG (ref 27–31)
MCHC RBC AUTO-ENTMCNC: 29.7 G/DL (ref 33–37)
MCV RBC AUTO: 105.9 FL (ref 81–99)
PERFORMED ON: ABNORMAL
PHOSPHATE SERPL-MCNC: 5.6 MG/DL (ref 2.5–4.5)
PLATELET # BLD AUTO: 163 K/UL (ref 130–400)
PMV BLD AUTO: 9.9 FL (ref 9.4–12.3)
POTASSIUM SERPL-SCNC: 4.5 MMOL/L (ref 3.5–5)
PROT SERPL-MCNC: 5.9 G/DL (ref 6.4–8.3)
RBC # BLD AUTO: 3.21 M/UL (ref 4.2–5.4)
SODIUM SERPL-SCNC: 133 MMOL/L (ref 136–145)
WBC # BLD AUTO: 7.8 K/UL (ref 4.8–10.8)

## 2024-11-11 PROCEDURE — 6370000000 HC RX 637 (ALT 250 FOR IP): Performed by: SURGERY

## 2024-11-11 PROCEDURE — 2140000000 HC CCU INTERMEDIATE R&B

## 2024-11-11 PROCEDURE — 80053 COMPREHEN METABOLIC PANEL: CPT

## 2024-11-11 PROCEDURE — 97110 THERAPEUTIC EXERCISES: CPT

## 2024-11-11 PROCEDURE — 82962 GLUCOSE BLOOD TEST: CPT

## 2024-11-11 PROCEDURE — 71045 X-RAY EXAM CHEST 1 VIEW: CPT

## 2024-11-11 PROCEDURE — 85027 COMPLETE CBC AUTOMATED: CPT

## 2024-11-11 PROCEDURE — 2580000003 HC RX 258: Performed by: SURGERY

## 2024-11-11 PROCEDURE — 6360000002 HC RX W HCPCS: Performed by: SURGERY

## 2024-11-11 PROCEDURE — 94660 CPAP INITIATION&MGMT: CPT

## 2024-11-11 PROCEDURE — 97116 GAIT TRAINING THERAPY: CPT

## 2024-11-11 PROCEDURE — 84100 ASSAY OF PHOSPHORUS: CPT

## 2024-11-11 PROCEDURE — 36415 COLL VENOUS BLD VENIPUNCTURE: CPT

## 2024-11-11 PROCEDURE — 94760 N-INVAS EAR/PLS OXIMETRY 1: CPT

## 2024-11-11 RX ORDER — DOCUSATE SODIUM 100 MG/1
200 CAPSULE, LIQUID FILLED ORAL 2 TIMES DAILY PRN
Status: DISCONTINUED | OUTPATIENT
Start: 2024-11-11 | End: 2024-11-12 | Stop reason: HOSPADM

## 2024-11-11 RX ADMIN — SEVELAMER CARBONATE 3200 MG: 800 TABLET, FILM COATED ORAL at 08:36

## 2024-11-11 RX ADMIN — METOPROLOL TARTRATE 25 MG: 25 TABLET, FILM COATED ORAL at 20:37

## 2024-11-11 RX ADMIN — CALCITRIOL CAPSULES 0.25 MCG 0.5 MCG: 0.25 CAPSULE ORAL at 08:37

## 2024-11-11 RX ADMIN — INSULIN LISPRO 4 UNITS: 100 INJECTION, SOLUTION INTRAVENOUS; SUBCUTANEOUS at 12:27

## 2024-11-11 RX ADMIN — FAMOTIDINE 20 MG: 20 TABLET ORAL at 18:38

## 2024-11-11 RX ADMIN — INSULIN LISPRO 4 UNITS: 100 INJECTION, SOLUTION INTRAVENOUS; SUBCUTANEOUS at 20:39

## 2024-11-11 RX ADMIN — SEVELAMER CARBONATE 3200 MG: 800 TABLET, FILM COATED ORAL at 18:38

## 2024-11-11 RX ADMIN — ATORVASTATIN CALCIUM 20 MG: 20 TABLET, FILM COATED ORAL at 20:37

## 2024-11-11 RX ADMIN — DOCUSATE SODIUM 200 MG: 100 CAPSULE, LIQUID FILLED ORAL at 09:55

## 2024-11-11 RX ADMIN — CITALOPRAM HYDROBROMIDE 40 MG: 20 TABLET ORAL at 08:36

## 2024-11-11 RX ADMIN — HEPARIN SODIUM 5000 UNITS: 5000 INJECTION INTRAVENOUS; SUBCUTANEOUS at 21:43

## 2024-11-11 RX ADMIN — BUPROPION HYDROCHLORIDE 100 MG: 100 TABLET, FILM COATED, EXTENDED RELEASE ORAL at 08:36

## 2024-11-11 RX ADMIN — METOPROLOL TARTRATE 25 MG: 25 TABLET, FILM COATED ORAL at 08:37

## 2024-11-11 RX ADMIN — SEVELAMER CARBONATE 3200 MG: 800 TABLET, FILM COATED ORAL at 12:30

## 2024-11-11 RX ADMIN — LEVOTHYROXINE SODIUM 125 MCG: 125 TABLET ORAL at 05:35

## 2024-11-11 RX ADMIN — HEPARIN SODIUM 5000 UNITS: 5000 INJECTION INTRAVENOUS; SUBCUTANEOUS at 14:42

## 2024-11-11 RX ADMIN — CLOPIDOGREL BISULFATE 75 MG: 75 TABLET ORAL at 08:36

## 2024-11-11 RX ADMIN — INSULIN GLARGINE 19 UNITS: 100 INJECTION, SOLUTION SUBCUTANEOUS at 20:39

## 2024-11-11 RX ADMIN — KETOROLAC TROMETHAMINE 15 MG: 30 INJECTION, SOLUTION INTRAMUSCULAR at 20:38

## 2024-11-11 RX ADMIN — HEPARIN SODIUM 5000 UNITS: 5000 INJECTION INTRAVENOUS; SUBCUTANEOUS at 05:34

## 2024-11-11 RX ADMIN — INSULIN LISPRO 2 UNITS: 100 INJECTION, SOLUTION INTRAVENOUS; SUBCUTANEOUS at 05:34

## 2024-11-11 RX ADMIN — INSULIN LISPRO 2 UNITS: 100 INJECTION, SOLUTION INTRAVENOUS; SUBCUTANEOUS at 18:38

## 2024-11-11 RX ADMIN — ALLOPURINOL 100 MG: 100 TABLET ORAL at 08:36

## 2024-11-11 RX ADMIN — TRAZODONE HYDROCHLORIDE 100 MG: 100 TABLET ORAL at 20:38

## 2024-11-11 RX ADMIN — MAGNESIUM HYDROXIDE 30 ML: 400 SUSPENSION ORAL at 08:36

## 2024-11-11 RX ADMIN — SODIUM CHLORIDE, PRESERVATIVE FREE 10 ML: 5 INJECTION INTRAVENOUS at 20:39

## 2024-11-11 ASSESSMENT — PAIN SCALES - GENERAL
PAINLEVEL_OUTOF10: 4
PAINLEVEL_OUTOF10: 4

## 2024-11-11 ASSESSMENT — PAIN DESCRIPTION - ORIENTATION: ORIENTATION: LEFT

## 2024-11-11 ASSESSMENT — PAIN DESCRIPTION - LOCATION: LOCATION: BREAST;INCISION

## 2024-11-11 ASSESSMENT — PAIN DESCRIPTION - DESCRIPTORS: DESCRIPTORS: ACHING;DISCOMFORT

## 2024-11-11 NOTE — PROGRESS NOTES
11/11/24 0900   Subjective   Subjective Patient in recliner has some discomfort in  side of chest does not rate is on RA.   Cognition   Overall Cognitive Status WFL   Orientation   Overall Orientation Status WFL   Vitals   O2 Device None (Room air)   Transfer Training   Transfer Training Yes   Sit to Stand Stand-by assistance;Contact-guard assistance   Stand to Sit Stand-by assistance;Contact-guard assistance   Gait Training   Gait Training Yes   Gait   Overall Level of Assistance Stand-by assistance;Contact-guard assistance   Distance (ft) 160 Feet   Assistive Device Walker, rolling   PT Exercises   A/AROM Exercises BL  LE ex. in sitting X 20 eeps.   Patient Education   Education Given To Patient;Family   Education Provided Role of Therapy;Plan of Care;Energy Conservation;Transfer Training;Fall Prevention Strategies   Education Provided Comments Use of call light, staff A & importance of walking.   Education Method Demonstration;Verbal   Barriers to Learning None   Education Outcome Verbalized understanding;Demonstrated understanding;Continued education needed   Other Specialty Interventions   Other Treatments/Modalities Patient in chair call ight all needs in reach family present.   Assessment   Activity Tolerance Patient tolerated treatment well   PT Plan of Care   Monday X       Electronically signed by Johnathon Lema PTA on 11/11/2024 at 9:43 AM

## 2024-11-11 NOTE — PROGRESS NOTES
11/11/24 1400   Subjective   Subjective Patient in chair agrees to walk has not had BM yet .  Patient has been given Colace.   Pain Assessment   Pain Assessment None - Denies Pain   Cognition   Overall Cognitive Status WFL   Orientation   Overall Orientation Status WFL   Vitals   O2 Device None (Room air)   Transfer Training   Sit to Stand Stand-by assistance;Contact-guard assistance   Stand to Sit Stand-by assistance;Contact-guard assistance   Gait   Overall Level of Assistance Stand-by assistance;Contact-guard assistance   Distance (ft) 160 Feet   Assistive Device Walker, rolling   PT Exercises   A/AROM Exercises BL  LE ex. in sitting X 20  reps.   Patient Education   Education Given To Patient   Education Provided Role of Therapy;Plan of Care;Energy Conservation;Transfer Training;Fall Prevention Strategies   Education Provided Comments Use of call light, staff A & importance of walking.   Education Method Demonstration;Verbal   Barriers to Learning None   Education Outcome Verbalized understanding;Demonstrated understanding;Continued education needed   Other Specialty Interventions   Other Treatments/Modalities Patient in chair call ight all needs in reach .   Assessment   Activity Tolerance Patient tolerated treatment well   PT Plan of Care   Monday X       Electronically signed by Johnathon Lema PTA on 11/11/2024 at 2:41 PM

## 2024-11-11 NOTE — PROGRESS NOTES
Nephrology ([facility]) Progress Note    Patient:  Sara Pardo  YOB: 1960  Date of Service: 11/11/2024  MRN: [id number]   Acct: [id number]   Primary Care Physician: Deepali Gilliland MD  Advance Directive: Full Code  Admit Date: 11/7/2024       Hospital Day: 4  Referring Provider: Niko Morataya MD    Patient independently seen and examined, Chart, Consults, Notes, Operative notes, Labs, Cardiology, and Radiology studies reviewed as available.    Chief complaint: End-stage renal disease/CAD.    Subjective:  Sara Pardo is a 64 y.o. female for whom we were consulted for evaluation and treatment of end-stage renal disease on maintenance dialysis/HHD.  Patient has history of type 2 diabetes, hypertension, DVT, abdominal obesity, coronary artery disease and IVC filter placement.  He recently underwent cardiac workup consistent with cardiac catheterization consistent with two-vessel coronary artery disease.  CT surgery was consulted, Dr. Morataya did robotic assisted two-vessel coronary artery bypass surgery.  Postoperative course was unremarkable.  She was initially admitted to ICU now transferred to regular floor.    This morning she is fully alert and awake, sitting up and has been walking in the hallways.  She is hoping to go home soon.    Allergies:  Codeine    Medicines:  Current Facility-Administered Medications   Medication Dose Route Frequency Provider Last Rate Last Admin    docusate sodium (COLACE) capsule 200 mg  200 mg Oral BID PRN Niko Morataya MD   200 mg at 11/11/24 0955    sevelamer (RENVELA) tablet 3,200 mg  3,200 mg Oral TID WC Niko Morataya MD   3,200 mg at 11/11/24 0836    metoprolol tartrate (LOPRESSOR) tablet 25 mg  25 mg Oral BID Niko Morataya MD   25 mg at 11/11/24 0837    ketorolac (TORADOL) injection 15 mg  15 mg IntraVENous Q6H PRN Niko Morataya MD   15 mg at 11/09/24 0809    glucose chewable tablet 16 g  4 tablet Oral PRN Niko Morataya MD   CABG/two-vessel  5.  Abdominal obesity.  6.  Anemia of chronic kidney disease.      Plan:  1.  Routine hemodialysis treatment tomorrow.  2.  Possible discharge to home postdialysis tomorrow    Leodan Smith MD  11/11/24  11:02 AM

## 2024-11-11 NOTE — PROCEDURES
Media Information      Document Information    Other Order: Pulmonary Function Result   Bedside Spirometry   11/11/2024 08:02   Attached To:   Bedside Spirometry [[id number]]   Orders Only on 11/11/24 with Niko Morataya MD   Source Information    Sharyn Nelson RCP  [facility]   Document History

## 2024-11-11 NOTE — PROGRESS NOTES
POST OP CARDIOTHORACIC SURGERY PROGRESS NOTE    Post op day: 3    SUBJECTIVE:  Ms. Pardo is resting in bed appearing comfortable. She denies current pain or SOB. She states she is feeling well.     /60   Pulse 68   Temp 97.2 °F (36.2 °C)   Resp 13   Wt 129.8 kg (286 lb 3.2 oz)   LMP 2000   SpO2 95%   BMI 46.90 kg/m²   Average, Min, and Max for last 24 hours Vitals:  TEMPERATURE:  Temp  Av.5 °F (36.4 °C)  Min: 97.1 °F (36.2 °C)  Max: 98.6 °F (37 °C)  RESPIRATIONS RANGE: Resp  Av.5  Min: 13  Max: 18  PULSE RANGE: Pulse  Av.9  Min: 68  Max: 76  BLOOD PRESSURE RANGE:  Systolic (24hrs), Av , Min:95 , Max:137   ; Diastolic (24hrs), Av, Min:36, Max:79    PULSE OXIMETRY RANGE: SpO2  Av %  Min: 91 %  Max: 96 %    I/O last 3 completed shifts:  In: 940 [P.O.:940]  Out: 30 [Urine:30]    CHEST: clear bilaterally.  Diminished to bases    CARDIOVASCULAR: RRR, no murmur    INCISION: CDI    LABS:  CBC:   Lab Results   Component Value Date/Time    WBC 7.8 2024 07:34 AM    RBC 3.21 2024 07:34 AM    HGB 10.1 2024 07:34 AM    HCT 34.0 2024 07:34 AM    .9 2024 07:34 AM    MCH 31.5 2024 07:34 AM    MCHC 29.7 2024 07:34 AM    RDW 16.3 2024 07:34 AM     2024 07:34 AM    MPV 9.9 2024 07:34 AM     CMP:    Lab Results   Component Value Date/Time     2024 07:34 AM    K 4.5 2024 07:34 AM    CL 93 2024 07:34 AM    CO2 24 2024 07:34 AM    BUN 59 2024 07:34 AM    CREATININE 8.9 2024 07:34 AM    LABGLOM 5 2024 07:34 AM    GLUCOSE 200 2024 07:34 AM    CALCIUM 9.4 2024 07:34 AM    BILITOT 0.2 2024 07:34 AM    ALKPHOS 61 2024 07:34 AM    AST 6 2024 07:34 AM    ALT <5 2024 07:34 AM       CHEST XRAY: Stable postop chest x-ray with atelectasis left > right    ASSESSMENT:   Multivessel CAD s/p Robotic 2V CABG on 2024 by Dr. Morataya  ESRD on

## 2024-11-12 ENCOUNTER — APPOINTMENT (OUTPATIENT)
Dept: GENERAL RADIOLOGY | Age: 64
End: 2024-11-12
Attending: SURGERY
Payer: MEDICARE

## 2024-11-12 VITALS
TEMPERATURE: 97.9 F | BODY MASS INDEX: 47.43 KG/M2 | WEIGHT: 289.4 LBS | SYSTOLIC BLOOD PRESSURE: 115 MMHG | RESPIRATION RATE: 18 BRPM | OXYGEN SATURATION: 96 % | DIASTOLIC BLOOD PRESSURE: 49 MMHG | HEART RATE: 65 BPM

## 2024-11-12 LAB
ALBUMIN SERPL-MCNC: 2.7 G/DL (ref 3.5–5.2)
ALP SERPL-CCNC: 67 U/L (ref 35–104)
ALT SERPL-CCNC: <5 U/L (ref 5–33)
ANION GAP SERPL CALCULATED.3IONS-SCNC: 18 MMOL/L (ref 7–19)
AST SERPL-CCNC: 6 U/L (ref 5–32)
BILIRUB SERPL-MCNC: 0.2 MG/DL (ref 0.2–1.2)
BUN SERPL-MCNC: 71 MG/DL (ref 8–23)
CALCIUM SERPL-MCNC: 9.4 MG/DL (ref 8.8–10.2)
CHLORIDE SERPL-SCNC: 91 MMOL/L (ref 98–111)
CO2 SERPL-SCNC: 23 MMOL/L (ref 22–29)
CREAT SERPL-MCNC: 11 MG/DL (ref 0.5–0.9)
ERYTHROCYTE [DISTWIDTH] IN BLOOD BY AUTOMATED COUNT: 16.2 % (ref 11.5–14.5)
GLUCOSE BLD-MCNC: 222 MG/DL (ref 70–99)
GLUCOSE SERPL-MCNC: 166 MG/DL (ref 70–99)
HCT VFR BLD AUTO: 34.3 % (ref 37–47)
HGB BLD-MCNC: 10.3 G/DL (ref 12–16)
MCH RBC QN AUTO: 31.5 PG (ref 27–31)
MCHC RBC AUTO-ENTMCNC: 30 G/DL (ref 33–37)
MCV RBC AUTO: 104.9 FL (ref 81–99)
PERFORMED ON: ABNORMAL
PLATELET # BLD AUTO: 168 K/UL (ref 130–400)
PMV BLD AUTO: 10.2 FL (ref 9.4–12.3)
POTASSIUM SERPL-SCNC: 4.4 MMOL/L (ref 3.5–5)
PROT SERPL-MCNC: 5.8 G/DL (ref 6.4–8.3)
RBC # BLD AUTO: 3.27 M/UL (ref 4.2–5.4)
SODIUM SERPL-SCNC: 132 MMOL/L (ref 136–145)
WBC # BLD AUTO: 6.8 K/UL (ref 4.8–10.8)

## 2024-11-12 PROCEDURE — 82962 GLUCOSE BLOOD TEST: CPT

## 2024-11-12 PROCEDURE — 85027 COMPLETE CBC AUTOMATED: CPT

## 2024-11-12 PROCEDURE — 2580000003 HC RX 258: Performed by: SURGERY

## 2024-11-12 PROCEDURE — 8010000000 HC HEMODIALYSIS ACUTE INPT

## 2024-11-12 PROCEDURE — 6370000000 HC RX 637 (ALT 250 FOR IP): Performed by: SURGERY

## 2024-11-12 PROCEDURE — 36415 COLL VENOUS BLD VENIPUNCTURE: CPT

## 2024-11-12 PROCEDURE — 71045 X-RAY EXAM CHEST 1 VIEW: CPT

## 2024-11-12 PROCEDURE — 80053 COMPREHEN METABOLIC PANEL: CPT

## 2024-11-12 PROCEDURE — 6360000002 HC RX W HCPCS: Performed by: SURGERY

## 2024-11-12 RX ORDER — OXYCODONE HYDROCHLORIDE 5 MG/1
5 TABLET ORAL EVERY 4 HOURS PRN
Qty: 28 TABLET | Refills: 0 | Status: SHIPPED | OUTPATIENT
Start: 2024-11-12 | End: 2024-11-19

## 2024-11-12 RX ADMIN — HEPARIN SODIUM 5000 UNITS: 5000 INJECTION INTRAVENOUS; SUBCUTANEOUS at 05:28

## 2024-11-12 RX ADMIN — ALLOPURINOL 100 MG: 100 TABLET ORAL at 12:48

## 2024-11-12 RX ADMIN — CITALOPRAM HYDROBROMIDE 40 MG: 20 TABLET ORAL at 12:47

## 2024-11-12 RX ADMIN — METOPROLOL TARTRATE 25 MG: 25 TABLET, FILM COATED ORAL at 12:48

## 2024-11-12 RX ADMIN — CLOPIDOGREL BISULFATE 75 MG: 75 TABLET ORAL at 12:48

## 2024-11-12 RX ADMIN — BUPROPION HYDROCHLORIDE 100 MG: 100 TABLET, FILM COATED, EXTENDED RELEASE ORAL at 12:48

## 2024-11-12 RX ADMIN — CALCITRIOL CAPSULES 0.25 MCG 0.5 MCG: 0.25 CAPSULE ORAL at 12:47

## 2024-11-12 RX ADMIN — HEPARIN SODIUM 5000 UNITS: 5000 INJECTION INTRAVENOUS; SUBCUTANEOUS at 12:53

## 2024-11-12 RX ADMIN — SEVELAMER CARBONATE 3200 MG: 800 TABLET, FILM COATED ORAL at 12:47

## 2024-11-12 RX ADMIN — SODIUM CHLORIDE, PRESERVATIVE FREE 10 ML: 5 INJECTION INTRAVENOUS at 12:00

## 2024-11-12 RX ADMIN — LEVOTHYROXINE SODIUM 125 MCG: 125 TABLET ORAL at 05:28

## 2024-11-12 NOTE — CARE COORDINATION
11/12/24 0842   IMM Letter   IMM Letter given to Patient/Family/Significant other/Guardian/POA/by: Jagruti templeton   IMM Letter date given: 11/12/24   IMM Letter time given: 0842     Patient declined waiting 4 hr period prior to discharge.  Second IMM given to patient and explained with patient verbalizing understanding.  All questions and concerns addressed     Signed letter placed in pt soft chart  Electronically signed by GUIDO YARBROUGH on 11/12/2024 at 8:44 AM

## 2024-11-12 NOTE — PROGRESS NOTES
11/12/24 1717   Encounter Summary   Encounter Overview/Reason Spiritual/Emotional Needs   Service Provided For Patient   Referral/Consult From [facility]   Support System Family members   Complexity of Encounter Low   Begin Time 0830   End Time  0900   Total Time Calculated 30 min   Spiritual/Emotional needs   Type Spiritual Support   Assessment/Intervention/Outcome   Assessment Hopeful;Concerns with suffering   Intervention Active listening;Explored/Affirmed feelings, thoughts, concerns;Prayer (assurance of)/[place]   Outcome Expressed feelings, needs, and concerns   Plan and Referrals   Plan/Referrals Continue Support (comment)     Electronically signed by miranda Caballero Mai on 11/12/2024 at 5:18 PM

## 2024-11-12 NOTE — PLAN OF CARE
Problem: Chronic Conditions and Co-morbidities  Goal: Patient's chronic conditions and co-morbidity symptoms are monitored and maintained or improved  Outcome: Progressing  Flowsheets (Taken 11/11/2024 2011)  Care Plan - Patient's Chronic Conditions and Co-Morbidity Symptoms are Monitored and Maintained or Improved: Monitor and assess patient's chronic conditions and comorbid symptoms for stability, deterioration, or improvement     Problem: Discharge Planning  Goal: Discharge to home or other facility with appropriate resources  Outcome: Progressing  Flowsheets (Taken 11/11/2024 2011)  Discharge to home or other facility with appropriate resources:   Identify barriers to discharge with patient and caregiver   Arrange for needed discharge resources and transportation as appropriate     Problem: Spiritual Struggle  Goal: Verbalizes spiritual struggle  Outcome: Progressing  Goal: Gains new understanding/perception  Outcome: Progressing  Goal: Growing sense of peace/hope  Outcome: Progressing  Goal: Explore resources for additional follow up, post discharge  Outcome: Progressing     Problem: Emotional Distress  Goal: Verbalization of thoughts and feelings  Outcome: Progressing  Goal: Reduce evidence of anxiety/worry/anger  Outcome: Progressing  Goal: Identifies/restores coping resources/skills  Outcome: Progressing  Goal: Growing sense of peace/hope  Outcome: Progressing  Goal: Explore resources for additional follow up, post discharge  Outcome: Progressing     Problem: Life Adjustment  Goal: Verbalization of feelings regarding change/loss  Outcome: Progressing  Goal: Finding meaning/purpose in the midst of illness/suffering  Outcome: Progressing  Goal: Identifies/restores coping resources/skills  Outcome: Progressing  Goal: Growing sense of peace/hope  Outcome: Progressing  Goal: Explore resources for additional follow up, post discharge  Outcome: Progressing     Problem: Support with Decision-Making  Goal:

## 2024-11-12 NOTE — DIALYSIS
[facility]  DIALYSIS TREATMENT SUMMARY      Note: Consult with the attending physician for patient treatment orders, this document is not a physician order.      Patient Information   Patient Sara Pardo   Date of Birth April 05, 1960   Chart Number [id number]   Location [facility] MRN [id number]   Gender Female   SSN (last 4) [id number]     Treatment Information   Treatment Type Hemodialysis   Treatment Id [id number]   Start Time November 12, 2024 08:45   End Time November 12, 2024 12:15   Acutal Duration 03:30     Treatment Balances   Total Saline Administered 500   Net Fluid Balance 2500    Hemodialysis Orders   Therapy Standard   Orders Verified Time 11/12/2024 09:00    Date Verified 11/12/2024   Duration 3:30   Isolated UF/Bypass No   BFR (mL) 450   DFR X1.5 BFR   DFR (mL) 700   Dialyzer Type OPTIFLUX 160NR   UF Order UF Goal Ordered   UF Goal Ordered (mL) 2500   Crit-Line used No   Heparin Initial Units Bolus No   Heparin IV Maintenance Bolus No   Heparin IV Infusion No   Potassium (mEq/L) 3   Calcium (mEq/L) 2.5   Bicarb (mg/dL) 35   Sodium (mEq/L) 138   Clinician Beverly Carvalho, ISAAC    Dialysis Access   Access Type AV Access   AV Access   Access Location Upper Arm - L   Needle 15g   Bruit Yes   Thrill Yes      Vitals   Pre-Treatment Vitals   Time Is BP being recorded? Pre BP Map BP Method Pulse RR Temp How was Weight Obtained? Pre Weight Previous Dry Weight Previous Post Weight Metric Target Fluid Removal (mL) Dialysate Confirmed Clinician    11/12/2024 08:30 BP/Map 109/45 (66) Noninvasive 63 18 97.8 How Obtained: Bed Scale 134   Kgs 2500 Yes Beverly Carvalho RN    Comments: Pt to dialysis suite via transport.      Post Treatment Vitals   Time Is BP being recorded? BP Map Pulse RR Temp How was Weight Obtained? Post Weight Metric BVP UF Goal Ordered NSS Given Intra-Procedure Total Machine UF Removed (mL) Crit-Line Ending Profile Crit-Line Refill Crit-Line Ending HCT  Knowledge / Understanding Assessed Teach back Method Knowledge / Understanding Assessed Teach back   Patient Education Family Education Provided? N/A Patient Education Reinforced By   Patient Educated? Yes Caregiver Education Provided? Yes Patient Education Reinforced by Beverly Carvalho RN   Focus or Topic Hemodialysis treatment review Focus or Topic Hemodialysis treatment review      Post-Treatment Machine Disinfection Requirement Notes TX complete, tolerated well. 2.5 L removed   Post Treatment Delay HD machine external disinfection completed per policy Yes Completed by   Delay N Post Treatment General Information Post Treatment Form Completed By Beverly Carvalho RN     Post Focused Assessment Position Anterior LOC Alert and Oriented x3   Changes from Pre Focused Assessment Respiratory Efforts Unlabored GI / Bowels   Changes from Pre Treatment Focused Assessment? No Edema GI Bowel sounds present   Access Location Generalized    Thrill Yes Edema Grade 2+  Anuric   Access Flow Good Cardiac Completed by   Dialyzer Clearance Clear Heart Rhythm Regular Post Treatment Focused Assessment Completed by Beverly Carvalho RN   Pain Screening Telemetry No Date 11/12/2024   Does the patient have pain? No Skin Time 12:20   Respiratory Skin Warm Signing   Lung Sounds Diminished  Dry Signed By Beverly Carvalho RN   Location Bilateral LOC

## 2024-11-12 NOTE — PROGRESS NOTES
lung base versus trace effusion.        ______________________________________ Electronically signed by: TODD JENKINS M.D. Date:     11/08/2024 Time:    14:58     Intraoperative LANNY    Result Date: 11/8/2024  See Anesthesia Procedure note for procedure details.    Cardiac procedure    Result Date: 11/4/2024  Double vessel, branch disease involving ostial LAD, first diagonal with intermediate proximal RCA stenosis. Severe calcific coronary artery disease. Normal LV systolic function with normal LV end-diastolic pressures. Mild aortic stenosis (mean gradient 19 mmHg). Porcelain aorta.     Nuclear stress test with myocardial perfusion    Result Date: 10/28/2024    Stress Combined Conclusion: The study is positive for moderate to large anterior myocardial ischemia.   Stress Function: Left ventricular function post-stress is normal. Post-stress ejection fraction is 68%.   Perfusion Comments: LV perfusion is abnormal. There is evidence of inducible ischemia.   Perfusion Defect: There is a moderate severity global left ventricular stress perfusion defect that is medium to large in size present in the anterior segment(s) that is partially reversible. This defect was visualized during the stress and rest phases of imaging. The defect appears to be moderate to large anterior ischemia.   ECG: Resting ECG demonstrates normal sinus rhythm.   ECG: The stress ECG was not diagnostic due to pharmacologic protocol.   Stress Test: A pharmacological stress test was performed using regadenoson (Lexiscan). The patient reported chest pressure during the stress test. Symptoms began during stress and ended during stress.   Resting ECG: The ECG shows sinus rhythm. The ECG shows premature atrial contractions.   Stress ECG: Arrhythmias during stress: frequent PACs. There were no noted arrhythmias during recovery. The stress ECG was not diagnostic due to pharmacologic protocol. Clinical correlation is advised.  Consideration for cardiac  catheterization.        Assessment   1.  End-stage renal disease on maintenance dialysis.  2.  Type II diabetic nephropathy.  3.  Benign essential hypertension.  4.  Status post robotic assisted CABG/two-vessel  5.  Abdominal obesity.  6.  Anemia of chronic kidney disease.  7.  Mild hyponatremia.      Plan:  1.  Tolerating dialysis well.  2.  She will resume HHD upon discharge.    Ledoan Smith MD  11/12/24  10:38 AM

## 2024-11-12 NOTE — DISCHARGE SUMMARY
[facility]  Discharge Summary    Patient ID: Sara Pardo      Patient's PCP: Deepali Gilliland MD    Admit Date: 11/7/2024     Discharge Date:  11/12/2024    Admitting Physician: Niko Morataya MD     Discharge Physician: Niko Morataya MD    Discharge Diagnoses:   Primary:   Multivessel CAD s/p Robotic 2V CABG on 11/08/2024 by Dr. Morataya   Secondary:   ESRD on PD  Morbid obesity  HTN  DM type II  Hx DVT s/p IVC filter  GERI on BiPAP      Procedures This Admit:   DATE OF PROCEDURE: 11/08/2024   Off pump minimally invasive robotic-assisted coronary artery bypass grafting x2 using a right internal mammary artery onto the first diagonal and left internal mammary artery onto the left anterior descending.   SURGEON: Niko Morataya MD       Consults:     IP CONSULT TO NEPHROLOGY    Complications: none    The patient was seen and examined on day of discharge and this discharge summary is in conjunction with any daily progress note from day of discharge.    History of Present Illness and Hospital Course:   The patient is a 64-year-old female who had chest discomfort, underwent a cardiology workup including a stress test followed by a left heart catheterization and this showed LAD and diagonal disease. It was not amenable to PCI; and so therefore, the patient was referred for cardiac surgery. 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 However, she was deemed to be an appropriate candidate for a minimally invasive robotic assisted surgery, which would base her grafts off the bilateral mammaries without a sternotomy, this did not pose sternal healing risk; and so therefore, this was felt to be the ideal grafting strategy for the patient's coronary disease. She understood the risks and benefits of the procedure and agreed to proceed.     The patient underwent HD the day prior to OR. The patient was taken to OR on 11/08/2024 and underwent Robotic 2V  CABG. She tolerated the operation without complication and was taken to PACU in serious but stable condition.     During the postoperative course the patient had no acute events overnight. She was hemodynamically stable, comfortable, and up to bedside chair on POD#1. She had stable CXR and underwent HD.  POD#2 pt oxygenating well on room air, ambulating 100ft with PT and transferred to PCU. Her glucose was elevated and stated on SSI. POD#3 pt continuing to recover smoothly from operation. She has some postop atelectasis but ambulating and oxygenating well. POD#4 pt undergoing HD. She continues to tolerated diet, ambulate, and have adequate pain control. Pt stable for discharge per Dr. Morataya.      Disposition:  Home     Follow-up:    Appointment with Dr. Morataya in 2 weeks for routine postoperative follow-up.       Discharge Medications:     See Discharge Medication Reconciliation Page

## 2024-11-12 NOTE — PROGRESS NOTES
11/12/24 0900   Subjective   Subjective Attempt patient in HD.   Vitals   Pulse 62   BP (!) 105/46   MAP (Calculated) 66   PT Plan of Care   Tuesday X       Electronically signed by Johnathon Lema PTA on 11/12/2024 at 9:38 AM

## 2024-11-12 NOTE — PROGRESS NOTES
POST OP CARDIOTHORACIC SURGERY PROGRESS NOTE    Post op day: 4    SUBJECTIVE:  Ms. Pardo is sitting up to bedside chair appearing comfortable. She is oxygenating well on room air. She ambulated with PT 160ft yesterday. Passing gas, no BM yet.    BP (!) 118/47   Pulse 62   Temp 98.4 °F (36.9 °C)   Resp 18   Wt 131.3 kg (289 lb 6.4 oz)   LMP 2000   SpO2 94%   BMI 47.43 kg/m²   Average, Min, and Max for last 24 hours Vitals:  TEMPERATURE:  Temp  Av.6 °F (36.4 °C)  Min: 97 °F (36.1 °C)  Max: 98.4 °F (36.9 °C)  RESPIRATIONS RANGE: Resp  Av.3  Min: 13  Max: 18  PULSE RANGE: Pulse  Av.8  Min: 62  Max: 72  BLOOD PRESSURE RANGE:  Systolic (24hrs), Av , Min:113 , Max:130   ; Diastolic (24hrs), Av, Min:42, Max:79    PULSE OXIMETRY RANGE: SpO2  Av.1 %  Min: 92 %  Max: 96 %    I/O last 3 completed shifts:  In: 790 [P.O.:790]  Out: -     CHEST: clear bilaterally    CARDIOVASCULAR: RRR without murmur    INCISION: CDI      LABS:  CBC:   Lab Results   Component Value Date/Time    WBC 6.8 2024 02:59 AM    RBC 3.27 2024 02:59 AM    HGB 10.3 2024 02:59 AM    HCT 34.3 2024 02:59 AM    .9 2024 02:59 AM    MCH 31.5 2024 02:59 AM    MCHC 30.0 2024 02:59 AM    RDW 16.2 2024 02:59 AM     2024 02:59 AM    MPV 10.2 2024 02:59 AM     BMP:    Lab Results   Component Value Date/Time     2024 02:59 AM    K 4.4 2024 02:59 AM    K 4.5 2024 07:34 AM    CL 91 2024 02:59 AM    CO2 23 2024 02:59 AM    BUN 71 2024 02:59 AM    CREATININE 11.0 2024 02:59 AM    CALCIUM 9.4 2024 02:59 AM    LABGLOM 4 2024 02:59 AM    GLUCOSE 166 2024 02:59 AM       CHEST XRAY: stable CXR    ASSESSMENT:   Multivessel CAD s/p Robotic 2V CABG on 2024 by Dr. Morataya  ESRD on PD  Morbid obesity  HTN  DM type II  Hx DVT s/p IVC filter    PLAN:   Stable for discharge home after HD    Electronically

## 2024-11-12 NOTE — DISCHARGE INSTRUCTIONS
[facility]  Ayse Guerra  [address], [address]  [place] [address]  [phone]  MD Niko Ocasio MD    POST OPERATIVE INSTRUCTIONS    Daily Activity:     Walking is the best way to regain your strength after your operation. A walking program is outlined below.  This is the minimal amount of activity expected; however, you may progress at a faster rate if capable.  You may use a treadmill, stationary bike, or weather permitting walk outside.      Week 1 5 minutes twice a day   Week 2 10 minutes twice a day   Week 3 15 minutes twice a day   Week 4 20 minutes twice a day     Do NOT lift anything over 10 pounds.     Keep legs elevated when sitting.  Place at least 2 pillows under your feet to prevent swelling.      Use your incentive spirometer (breathing device) 4 to 5 times a day for the first 2 weeks. Take deep breaths and cough frequently.      Do NOT drive a car, until your physician says you can at your follow up visit.  You may ride in a car, but preferable not behind an airbag.      Light Housework is ok (dusting , dishwashing, folding clothes).    Sexual relations will not interfere with your healing, but you need to avoid weight-bearing positions.    Shower daily with a liquid anti-bacterial soap (Dial). Avoid extremely hot water. To help prevent infection, do not use any powder, lotion or cream on your incision unless instructed by your physician. Your incision may appear red, bruised, dry or numb for several weeks.     Support hose will need to be worn for 2 weeks after you go home. You may take the hose off to sleep at night, but put them back on before getting out of bed in the morning. You may stop wearing the hose 14 days after you go home.           You may not have a good appetite for several weeks after surgery. At this time, you will not be on a restricted diet, unless you were on a special diet (hypertension, diabetes)

## 2024-11-13 NOTE — CONSULTS
Post-op Cardiac Rehab education packet was sent to the patient's address on record.  Handouts included were titled; \"Home Instructions Following Heart Surgery\", \"Cardiac Home Exercise Program - Phase I\", \"A Healthy Heart: Care Instructions\", \"Cardiac Diet/Low Cholesterol\" and \"Cardiac Rehabilitation - An Individualized Supervised Program For You\".  Patient was instructed to contact [facility] or the hospital nearest their residence in order to enroll in Phase II Outpatient Cardiac Rehab.

## 2024-11-15 ENCOUNTER — TELEPHONE (OUTPATIENT)
Dept: CARDIOTHORACIC SURGERY | Age: 64
End: 2024-11-15

## 2024-11-15 DIAGNOSIS — L76.82 PAIN AT SURGICAL INCISION: Primary | ICD-10-CM

## 2024-11-15 RX ORDER — OXYCODONE HYDROCHLORIDE 5 MG/1
5 TABLET ORAL EVERY 6 HOURS PRN
Qty: 12 TABLET | Refills: 0 | Status: SHIPPED | OUTPATIENT
Start: 2024-11-15 | End: 2024-11-18

## 2024-11-16 NOTE — PROGRESS NOTES
[facility]  HEMATOLOGY & ONCOLOGY    Cancer Staging Information:  Cancer Staging  No matching staging information was found for the patient.      Subjective     VISIT DIAGNOSIS:   Encounter Diagnosis   Name Primary?   • Anemia in stage 3 chronic kidney disease (CMS/HCC)        REASON FOR VISIT:     Chief Complaint   Patient presents with   • Anemia     here for follow up, no complaints         HEMATOLOGY / ONCOLOGY HISTORY:    No history exists.           INTERVAL HISTORY  Patient ID: Maria C Shrestha is a 59 y.o. year old female Cancer Staging    12/3/19: she has a lot of family stressors that is making her depressed.    1/8/2020: No new issues.   - Denies sob,cp,n/v/dizziness.occassional blood streaked stool from hemorrhoids.  --rest of ros unremarkable. Physical exam unremarkable.    Past Medical History:   Past Medical History:   Diagnosis Date   • Acquired hypothyroidism 2/6/2017   • Allergic    • Anemia    • Anxiety    • Arthritis    • Cellulitis    • Chronic kidney disease     3rd stage   • Depression    • Disease of thyroid gland    • Dyslipidemia    • Elevated cholesterol    • Essential hypertension    • Fatigue    • Gallbladder abscess    • Hearing loss    • Light headed    • Limited range of motion (ROM) of shoulder     right   • Obesity    • Palpitation    • Short of breath on exertion    • Sleep apnea    • Type 2 diabetes mellitus (CMS/HCC)    • Type II diabetes mellitus, uncontrolled (CMS/HCC)    • Wears glasses      Past Surgical History:   Past Surgical History:   Procedure Laterality Date   • ADENOIDECTOMY     • ANAL FISTULA REPAIR      x 2    • CHOLECYSTECTOMY     • COLONOSCOPY  01/02/2014   • COLONOSCOPY N/A 3/22/2017    Procedure: COLONOSCOPY WITH ANESTHESIA;  Surgeon: Mono Linder MD;  Location: [facility] ENDOSCOPY;  Service:    • D&C HYSTEROSCOPY N/A 7/25/2018    Procedure: DILATATION AND CURETTAGE HYSTEROSCOPY;  Surgeon: Michael Castaneda MD;  Location: [facility] OR;  Service:  Obstetrics/Gynecology   • DILATATION AND CURETTAGE      X 2   • ENDOSCOPY     • TONSILLECTOMY       Social History:   Social History     Socioeconomic History   • Marital status:      Spouse name: Not on file   • Number of children: Not on file   • Years of education: Not on file   • Highest education level: Not on file   Tobacco Use   • Smoking status: Never Smoker   • Smokeless tobacco: Never Used   Substance and Sexual Activity   • Alcohol use: No   • Drug use: No   • Sexual activity: Never     Birth control/protection: Post-menopausal     Family History:   Family History   Problem Relation Age of Onset   • Diabetes Other    • Cancer Mother    • Cancer Father    • Heart disease Father    • Diabetes Father    • Obesity Father    • Stroke Father    • Cancer Maternal Grandmother    • Uterine cancer Maternal Grandmother    • Diabetes Maternal Grandfather    • Cancer Paternal Grandmother    • Colon cancer Paternal Grandmother    • Diabetes Paternal Grandfather    • Heart disease Paternal Grandfather    • No Known Problems Sister    • No Known Problems Brother    • No Known Problems Daughter    • No Known Problems Son    • No Known Problems Maternal Aunt    • No Known Problems Paternal Aunt    • BRCA 1/2 Neg Hx    • Breast cancer Neg Hx    • Endometrial cancer Neg Hx    • Ovarian cancer Neg Hx        Review of Systems   See HPI otherwise unremarkable.  Performance Status:  Asymptomatic    Medications:    Current Outpatient Medications   Medication Sig Dispense Refill   • allopurinol (ZYLOPRIM) 100 MG tablet Take 100 mg by mouth 2 (Two) Times a Day.     • amLODIPine (NORVASC) 5 MG tablet      • aspirin 81 MG tablet Take 81 mg by mouth Daily. Stop 7/22/2018     • busPIRone (BUSPAR) 10 MG tablet buspirone 10 mg tablet   Take 2 tablets twice a day by oral route as needed for 90 days.     • carvedilol (COREG) 3.125 MG tablet Take 3.125 mg by mouth 2 (Two) Times a Day.     • cetirizine (zyrTEC) 10 MG tablet Take 10 mg  "by mouth As Needed.     • Cholecalciferol (VITAMIN D3) 89096 units tablet Vitamin D2 50,000 unit capsule   Take 1 capsule every week by oral route. Sunday     • cinacalcet (SENSIPAR) 30 MG tablet Take 1 tablet by mouth Daily. 30 tablet 3   • citalopram (CeleXA) 20 MG tablet Take 20 mg by mouth Daily.     • diazePAM (VALIUM) 2 MG tablet Take 2 mg by mouth As Needed for Anxiety.     • Dulaglutide (TRULICITY) 1.5 MG/0.5ML solution pen-injector Inject 1.5 mg under the skin into the appropriate area as directed Every 7 (Seven) Days. 6 mL 3   • Ergocalciferol (VITAMIN D2 PO) Take 1,250 mcg by mouth 1 (One) Time Per Week. VITAMIN D2 1250 MCG (50,000 UNIT) CAPSULE TAKE 1 CAPSULE EVERY WEEK BY ORAL ROUTE     • fluticasone (VERAMYST) 27.5 MCG/SPRAY nasal spray 2 sprays into each nostril Daily.     • Glucose Blood (BLOOD GLUCOSE TEST) strip Use 4 x daily, use any brand covered by insurance or same brand as before 360 each 3   • ibuprofen (ADVIL,MOTRIN) 800 MG tablet Take 800 mg by mouth Every 8 (Eight) Hours As Needed for Mild Pain .     • insulin glargine (LANTUS) 100 UNIT/ML injection INJECT 200 UNITS DAILY 180 mL 3   • Insulin Lispro (HUMALOG KWIKPEN) 200 UNIT/ML solution pen-injector Inject 30 Units under the skin into the appropriate area as directed 3 (Three) Times a Day With Meals. 5 pen 11   • Insulin Pen Needle (PEN NEEDLES) 31G X 6 MM misc Use to inject insulin 4 times daily. 200 each 11   • Insulin Syringe 31G X 5/16\" 1 ML misc 4 x daily 120 each 11   • lamoTRIgine (LaMICtal) 50 MG tablet dispersible disintegrating tablet Take 50 mg by mouth Every Night.     • Lancets (FREESTYLE) lancets FOUR TIMES A  each 11   • levothyroxine (SYNTHROID) 75 MCG tablet Take 1 tablet by mouth Daily. 30 tablet 11   • lisinopril (PRINIVIL,ZESTRIL) 20 MG tablet Take 40 mg by mouth 2 (Two) Times a Day.     • Melatonin 10 MG tablet Take  by mouth.     • Multiple Vitamins-Minerals (MULTIVITAMIN ADULT PO) Take 1 tablet by mouth " "Daily.     • Omega-3 Fatty Acids (FISH OIL) 1000 MG capsule capsule Take 2,000 mg by mouth Daily With Dinner.     • potassium gluconate 595 (99 K) MG tablet tablet potassium gluconate 595 mg (99 mg) tablet   Take 1 tablets every day by oral route.     • rosuvastatin (CRESTOR) 10 MG tablet Take 10 mg by mouth Daily.     • sodium bicarbonate 650 MG tablet Take 650 mg by mouth 4 (Four) Times a Day.     • traMADol (ULTRAM) 50 MG tablet Take 1 tablet by mouth Every 6 (Six) Hours As Needed for Moderate Pain . 8 tablet 0     No current facility-administered medications for this visit.        ALLERGIES:    Allergies   Allergen Reactions   • Codeine Nausea Only       Objective      Vitals:    01/08/20 1313   BP: 152/65   Pulse: 80   Resp: 16   Temp: 96.9 °F (36.1 °C)   SpO2: 97%   Weight: (!) 179 kg (394 lb 4.8 oz)   Height: 165.1 cm (65\")   PainSc: 0-No pain         Current Status 1/8/2020   ECOG score 1         Physical Examremains the same  General Appearance:obese female.  Patient is awake, alert, oriented and in no acute distress. Patient is welldeveloped, wellnourished, and appears stated age.  HEENT: Normocephalic. Sclerae clear, conjunctiva pink, extraocular movements intact, pupils, round, reactive to light and  accommodation. Mouth and throat are clear with moist oral mucosa.  NECK: Supple, no jugular venous distention, thyroid not enlarged.  LYMPH: No cervical, supraclavicular, axillary, or inguinal lymphadenopathy.  CHEST: Equal bilateral expansion, AP  diameter normal, resonant percussion note  LUNGS: Good air movement, no rales, rhonchi, rubs or wheezes with auscultation  CARDIO: Regular sinus rhythm, no murmurs, gallops or rubs.  ABDOMEN: obese abdomen. Nondistended, soft, No tenderness, no guarding, no rebound, No hepatosplenomegaly. No abdominal masses. Bowel sounds positive. No hernia  GENITALIA: Not examined.  BREASTS: Not examined.  MUSKEL: No joint swelling, decreased motion, or inflammation  EXTREMS: " kristie YAZAN. No clubbing, cyanosis, No varicose veins.  NEURO: Grossly nonfocal, Gait is coordinated and smooth, Cognition is preserved.  SKIN: No rashes, no ecchymoses, no petechia.  PSYCH: Oriented to time, place and person. Memory is preserved. Mood and affect appear normal  RECENT LABS:  Lab on 01/08/2020   Component Date Value Ref Range Status   • Glucose 01/08/2020 132* 65 - 99 mg/dL Final   • BUN 01/08/2020 33* 6 - 20 mg/dL Final   • Creatinine 01/08/2020 2.21* 0.57 - 1.00 mg/dL Final   • Sodium 01/08/2020 144  136 - 145 mmol/L Final   • Potassium 01/08/2020 4.1  3.5 - 5.2 mmol/L Final   • Chloride 01/08/2020 107  98 - 107 mmol/L Final   • CO2 01/08/2020 27.0  22.0 - 29.0 mmol/L Final   • Calcium 01/08/2020 9.0  8.6 - 10.5 mg/dL Final   • Total Protein 01/08/2020 6.7  6.0 - 8.5 g/dL Final   • Albumin 01/08/2020 3.40* 3.50 - 5.20 g/dL Final   • ALT (SGPT) 01/08/2020 11  1 - 33 U/L Final   • AST (SGOT) 01/08/2020 12  1 - 32 U/L Final   • Alkaline Phosphatase 01/08/2020 72  39 - 117 U/L Final   • Total Bilirubin 01/08/2020 <0.2* 0.2 - 1.2 mg/dL Final   • eGFR Non African Amer 01/08/2020 23* >60 mL/min/1.73 Final   • Globulin 01/08/2020 3.3  gm/dL Final   • A/G Ratio 01/08/2020 1.0  g/dL Final   • BUN/Creatinine Ratio 01/08/2020 14.9  7.0 - 25.0 Final   • Anion Gap 01/08/2020 10.0  5.0 - 15.0 mmol/L Final   • WBC 01/08/2020 10.81* 3.40 - 10.80 10*3/mm3 Final   • RBC 01/08/2020 3.54* 3.77 - 5.28 10*6/mm3 Final   • Hemoglobin 01/08/2020 10.6* 12.0 - 15.9 g/dL Final   • Hematocrit 01/08/2020 32.2* 34.0 - 46.6 % Final   • MCV 01/08/2020 91.0  79.0 - 97.0 fL Final   • MCH 01/08/2020 29.9  26.6 - 33.0 pg Final   • MCHC 01/08/2020 32.9  31.5 - 35.7 g/dL Final   • RDW 01/08/2020 16.5* 12.3 - 15.4 % Final   • RDW-SD 01/08/2020 55.7* 37.0 - 54.0 fl Final   • MPV 01/08/2020 10.3  6.0 - 12.0 fL Final   • Platelets 01/08/2020 235  140 - 450 10*3/mm3 Final   • Neutrophil % 01/08/2020 72.5  42.7 - 76.0 % Final   • Lymphocyte %  01/08/2020 18.5* 19.6 - 45.3 % Final   • Monocyte % 01/08/2020 6.1  5.0 - 12.0 % Final   • Eosinophil % 01/08/2020 1.8  0.3 - 6.2 % Final   • Basophil % 01/08/2020 0.5  0.0 - 1.5 % Final   • Immature Grans % 01/08/2020 0.6* 0.0 - 0.5 % Final   • Neutrophils, Absolute 01/08/2020 7.84* 1.70 - 7.00 10*3/mm3 Final   • Lymphocytes, Absolute 01/08/2020 2.00  0.70 - 3.10 10*3/mm3 Final   • Monocytes, Absolute 01/08/2020 0.66  0.10 - 0.90 10*3/mm3 Final   • Eosinophils, Absolute 01/08/2020 0.19  0.00 - 0.40 10*3/mm3 Final   • Basophils, Absolute 01/08/2020 0.05  0.00 - 0.20 10*3/mm3 Final   • Immature Grans, Absolute 01/08/2020 0.07* 0.00 - 0.05 10*3/mm3 Final   • nRBC 01/08/2020 0.0  0.0 - 0.2 /100 WBC Final   • Extra Tube 01/08/2020 Hold for add-ons.   Final    Auto resulted.   • Extra Tube 01/08/2020 Hold for add-ons.   Final    Auto resulted.       RADIOLOGY:  No results found.         Assessment/Plan  Maria C Shrestha is a 59 y.o. year old female  Whom we are seeing for AoCKD on procrit that is stable.    Patient Active Problem List   Diagnosis   • Type 2 diabetes mellitus with stage 3 chronic kidney disease, with long-term current use of insulin (CMS/HCC)   • Mixed diabetic hyperlipidemia associated with type 2 diabetes mellitus (CMS/HCC)   • Hypertension associated with diabetes (CMS/HCC)   • Vitamin D deficiency   • B12 deficiency   • Anemia in CKD (chronic kidney disease)   • Acquired hypothyroidism   • Chronic kidney disease, stage 4 (severe) (CMS/HCC)   • Anemia   • Anxiety   • Deep venous thrombosis of lower extremity (CMS/HCC)   • Disease of liver   • Embolism (CMS/HCC)   • Hypertriglyceridemia   • Hypocalcemia   • Mood disorder (CMS/HCC)   • Morbid obesity (CMS/HCC)   • Sleep apnea   • Acute renal failure superimposed on stage 3 chronic kidney disease (CMS/HCC)   • Anemia of chronic renal failure   • Hypertension   • Diabetes mellitus (CMS/HCC)   • Thyroid disease   • Hypothyroidism   • Type 2 DM with CKD stage 3  and hypertension (CMS/HCC)   • Vitamin D deficiency          1. AoCKD: s/p iron infusions. Labs reviewed with the pt cr 2.21, wbc 10.81, Hg 10.6, plt 235  - Ok for procrit shot today. Goal Hg is >10 and <12.  -iron 77, %sat 28. Iron not indicated      2. HTN: on amlodipine, carvedilol, lisinopril    3.DM: on trulicity    4.Hypothyroidism: on synthroid  5. Anxiety/depression: on diazepam, celexa  6. Hyperlipidemia: on crestor.  7. CKD stage 4: sees renal. grf 24          Rodrigo Huitron MD    1/8/2020    2:08 PM   17-Nov-2024 02:00

## 2024-11-22 DIAGNOSIS — E11.69 MIXED DIABETIC HYPERLIPIDEMIA ASSOCIATED WITH TYPE 2 DIABETES MELLITUS (HCC): ICD-10-CM

## 2024-11-22 DIAGNOSIS — E78.2 MIXED DIABETIC HYPERLIPIDEMIA ASSOCIATED WITH TYPE 2 DIABETES MELLITUS (HCC): ICD-10-CM

## 2024-11-22 DIAGNOSIS — F33.1 MAJOR DEPRESSIVE DISORDER, RECURRENT, MODERATE (HCC): ICD-10-CM

## 2024-11-22 RX ORDER — CITALOPRAM HYDROBROMIDE 40 MG/1
40 TABLET ORAL DAILY
Qty: 90 TABLET | Refills: 1 | Status: SHIPPED | OUTPATIENT
Start: 2024-11-22

## 2024-11-22 RX ORDER — ROSUVASTATIN CALCIUM 20 MG/1
20 TABLET, COATED ORAL DAILY
Qty: 90 TABLET | Refills: 1 | Status: SHIPPED | OUTPATIENT
Start: 2024-11-22 | End: 2025-05-21

## 2024-11-22 NOTE — TELEPHONE ENCOUNTER
Sara Pardo called to request a refill on her medication.      Last office visit : 10/14/2024   Next office visit : 4/22/2025     Requested Prescriptions     Pending Prescriptions Disp Refills    rosuvastatin (CRESTOR) 20 MG tablet [Pharmacy Med Name: rosuvastatin 20 mg tablet] 90 tablet 1     Sig: Take 1 tablet by mouth daily    citalopram (CELEXA) 40 MG tablet [Pharmacy Med Name: citalopram 40 mg tablet] 90 tablet 1     Sig: TAKE ONE TABLET BY MOUTH EVERY DAY            Brandy Garcia LPN

## 2024-12-05 ENCOUNTER — OFFICE VISIT (OUTPATIENT)
Dept: CARDIOTHORACIC SURGERY | Age: 64
End: 2024-12-05

## 2024-12-05 VITALS — OXYGEN SATURATION: 95 % | DIASTOLIC BLOOD PRESSURE: 67 MMHG | SYSTOLIC BLOOD PRESSURE: 139 MMHG | HEART RATE: 75 BPM

## 2024-12-05 DIAGNOSIS — Z95.1 S/P CABG X 2: Primary | ICD-10-CM

## 2024-12-05 PROCEDURE — 99024 POSTOP FOLLOW-UP VISIT: CPT | Performed by: SURGERY

## 2024-12-16 ENCOUNTER — TELEPHONE (OUTPATIENT)
Dept: PSYCHIATRY | Age: 64
End: 2024-12-16

## 2024-12-16 RX ORDER — FAMOTIDINE 20 MG/1
TABLET, FILM COATED ORAL
Qty: 90 TABLET | Refills: 1 | Status: SHIPPED | OUTPATIENT
Start: 2024-12-16

## 2024-12-16 NOTE — TELEPHONE ENCOUNTER
Sara Pardo called to request a refill on her medication.      Last office visit : 10/14/2024   Next office visit : 4/22/2025     Requested Prescriptions     Pending Prescriptions Disp Refills    famotidine (PEPCID) 20 MG tablet [Pharmacy Med Name: famotidine 20 mg tablet] 90 tablet 1     Sig: TAKE (1/2) TABLET BY MOUTH EVERY EVENING FOR GERD       Please clarify directions as patient has reported taking it differently.      Brandy Garcia LPN

## 2024-12-16 NOTE — TELEPHONE ENCOUNTER
Called pt to see if they wanted to take an earlier appt that came open with Dr. Norton for 12/16/24 @ 2:00.    Pt declined because she has a dentist appt at 2:30, so they would not be able to make the appt.  Will keep on the cancellation list.    Electronically signed by Belen Carolina MA on 12/16/2024 at 2:45 PM

## 2024-12-19 ENCOUNTER — OFFICE VISIT (OUTPATIENT)
Dept: CARDIOTHORACIC SURGERY | Age: 64
End: 2024-12-19

## 2024-12-19 VITALS
DIASTOLIC BLOOD PRESSURE: 66 MMHG | SYSTOLIC BLOOD PRESSURE: 161 MMHG | OXYGEN SATURATION: 93 % | HEART RATE: 89 BPM | WEIGHT: 274.69 LBS | HEIGHT: 66 IN | BODY MASS INDEX: 44.15 KG/M2

## 2024-12-19 DIAGNOSIS — Z95.1 S/P CABG (CORONARY ARTERY BYPASS GRAFT): Primary | ICD-10-CM

## 2024-12-19 PROCEDURE — 99024 POSTOP FOLLOW-UP VISIT: CPT | Performed by: SURGERY

## 2024-12-23 ASSESSMENT — ENCOUNTER SYMPTOMS
APNEA: 0
PHOTOPHOBIA: 0
WHEEZING: 0
CHEST TIGHTNESS: 0
NAUSEA: 0
SHORTNESS OF BREATH: 0
DIARRHEA: 0
BLOOD IN STOOL: 0
COLOR CHANGE: 0
TROUBLE SWALLOWING: 0
SINUS PAIN: 0
CONSTIPATION: 0

## 2024-12-23 NOTE — PROGRESS NOTES
Subjective   Patient ID: Sara Pardo is a 64 y.o. female.    HPI Underwent high risk CABG due to morbid obesity. Tolerated surgery well. Minithoracotomy wound has some fibrinous exudate but otherwise is doing well.    Review of Systems   Constitutional:  Negative for activity change, appetite change, chills, diaphoresis, fatigue, fever and unexpected weight change.   HENT:  Negative for congestion, hearing loss, sinus pain and trouble swallowing.    Eyes:  Negative for photophobia and visual disturbance.   Respiratory:  Negative for apnea, chest tightness, shortness of breath and wheezing.    Cardiovascular:  Negative for chest pain, palpitations and leg swelling.   Gastrointestinal:  Negative for blood in stool, constipation, diarrhea and nausea.   Endocrine: Negative for cold intolerance, polyphagia and polyuria.   Genitourinary:  Negative for difficulty urinating, flank pain, menstrual problem and vaginal bleeding.   Musculoskeletal:  Negative for gait problem, neck pain and neck stiffness.   Skin:  Negative for color change.   Neurological:  Negative for dizziness, tremors, seizures and speech difficulty.   Hematological:  Negative for adenopathy.   Psychiatric/Behavioral:  Negative for dysphoric mood.           Objective   Physical Exam  HENT:      Mouth/Throat:      Mouth: Mucous membranes are moist.   Cardiovascular:      Rate and Rhythm: Regular rhythm.      Heart sounds: Normal heart sounds.   Pulmonary:      Effort: Pulmonary effort is normal.      Breath sounds: Normal breath sounds.   Chest:       Abdominal:      Palpations: Abdomen is soft.   Musculoskeletal:         General: Normal range of motion.   Skin:     General: Skin is warm.      Capillary Refill: Capillary refill takes less than 2 seconds.   Neurological:      General: No focal deficit present.      Mental Status: She is alert and oriented to person, place, and time.   Psychiatric:         Behavior: Behavior normal.          Assessment

## 2024-12-30 ENCOUNTER — OFFICE VISIT (OUTPATIENT)
Age: 64
End: 2024-12-30
Payer: MEDICARE

## 2024-12-30 VITALS — BODY MASS INDEX: 46.38 KG/M2 | HEIGHT: 65 IN | WEIGHT: 278.4 LBS

## 2024-12-30 DIAGNOSIS — M17.12 PRIMARY OSTEOARTHRITIS OF LEFT KNEE: Primary | ICD-10-CM

## 2024-12-30 PROCEDURE — G8484 FLU IMMUNIZE NO ADMIN: HCPCS | Performed by: PHYSICIAN ASSISTANT

## 2024-12-30 PROCEDURE — 1036F TOBACCO NON-USER: CPT | Performed by: PHYSICIAN ASSISTANT

## 2024-12-30 PROCEDURE — G8427 DOCREV CUR MEDS BY ELIG CLIN: HCPCS | Performed by: PHYSICIAN ASSISTANT

## 2024-12-30 PROCEDURE — 20610 DRAIN/INJ JOINT/BURSA W/O US: CPT | Performed by: PHYSICIAN ASSISTANT

## 2024-12-30 PROCEDURE — 99213 OFFICE O/P EST LOW 20 MIN: CPT | Performed by: PHYSICIAN ASSISTANT

## 2024-12-30 PROCEDURE — G8417 CALC BMI ABV UP PARAM F/U: HCPCS | Performed by: PHYSICIAN ASSISTANT

## 2024-12-30 PROCEDURE — 3017F COLORECTAL CA SCREEN DOC REV: CPT | Performed by: PHYSICIAN ASSISTANT

## 2024-12-30 RX ORDER — BETAMETHASONE SODIUM PHOSPHATE AND BETAMETHASONE ACETATE 3; 3 MG/ML; MG/ML
6 INJECTION, SUSPENSION INTRA-ARTICULAR; INTRALESIONAL; INTRAMUSCULAR; SOFT TISSUE ONCE
Status: COMPLETED | OUTPATIENT
Start: 2024-12-30 | End: 2024-12-30

## 2024-12-30 RX ORDER — BUPIVACAINE HYDROCHLORIDE 5 MG/ML
3 INJECTION, SOLUTION EPIDURAL; INTRACAUDAL ONCE
Status: COMPLETED | OUTPATIENT
Start: 2024-12-30 | End: 2024-12-30

## 2024-12-30 RX ORDER — LIDOCAINE HYDROCHLORIDE 10 MG/ML
1 INJECTION, SOLUTION INFILTRATION; PERINEURAL ONCE
Status: COMPLETED | OUTPATIENT
Start: 2024-12-30 | End: 2024-12-30

## 2024-12-30 RX ADMIN — BETAMETHASONE SODIUM PHOSPHATE AND BETAMETHASONE ACETATE 6 MG: 3; 3 INJECTION, SUSPENSION INTRA-ARTICULAR; INTRALESIONAL; INTRAMUSCULAR; SOFT TISSUE at 09:20

## 2024-12-30 RX ADMIN — LIDOCAINE HYDROCHLORIDE 1 ML: 10 INJECTION, SOLUTION INFILTRATION; PERINEURAL at 09:21

## 2024-12-30 RX ADMIN — BUPIVACAINE HYDROCHLORIDE 15 MG: 5 INJECTION, SOLUTION EPIDURAL; INTRACAUDAL at 09:21

## 2024-12-30 ASSESSMENT — PAIN DESCRIPTION - ORIENTATION: ORIENTATION: DISTAL

## 2024-12-30 NOTE — PROGRESS NOTES
Orthopaedic Clinic Note - Established Patient    NAME:  Sara Pardo   : 1960  MRN: [id number]      2024      CHIEF COMPLAINT: Left knee pain      HISTORY OF PRESENT ILLNESS:   This is a pleasant 64-year-old morbidly obese female who comes in with left knee pain.  This is a chronic issue.  Has history of osteoarthritis.  Patient is status post recent coronary artery bypass grafting.  She still overcoming that surgery.  Patient's had injections in her knees in the past.  Injections give her mild to moderate relief.  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 Pt not sure of date, states fall 2023    CHF (congestive heart failure) ([facility])     CKD (chronic kidney disease)     stage 4    Colitis     Colon polyp     Deep vein thrombosis ([facility])     Depression     Embolism - blood clot     Hemodialysis patient ([facility])     5 days a week at home, takes  + one other day off each week    Hx of blood clots     Hyperlipidemia     Hypertension     Hypertension associated with diabetes ([facility]) 10/17/2016    Obesity, morbid, BMI 50 or higher     Sleep apnea     CPAP    Thyroid disease     Type II or unspecified type diabetes mellitus without mention of complication, not stated as uncontrolled        Past Surgical History:        Procedure Laterality Date    CARDIAC PROCEDURE N/A 2024    Left heart cath / coronary angiography performed by Tai Nuñez MD at [facility] CARDIAC CATH LAB    CHOLECYSTECTOMY, LAPAROSCOPIC      CORONARY ARTERY BYPASS GRAFT N/A 2024    ROBOTIC CABG X2 WITH BILATERAL INTERNAL MAMMARY ARTERIES WITH PERFUSION STANDBY, TRANSESOPHAGEAL ECHOCARDIOGRAM performed by [facility]

## 2025-01-02 ENCOUNTER — OFFICE VISIT (OUTPATIENT)
Dept: CARDIOLOGY CLINIC | Age: 65
End: 2025-01-02
Payer: MEDICARE

## 2025-01-02 VITALS
SYSTOLIC BLOOD PRESSURE: 108 MMHG | HEIGHT: 65 IN | DIASTOLIC BLOOD PRESSURE: 66 MMHG | WEIGHT: 272.2 LBS | BODY MASS INDEX: 45.35 KG/M2 | HEART RATE: 72 BPM | OXYGEN SATURATION: 98 %

## 2025-01-02 DIAGNOSIS — I10 ESSENTIAL HYPERTENSION: ICD-10-CM

## 2025-01-02 DIAGNOSIS — I25.10 CORONARY ARTERY DISEASE INVOLVING NATIVE CORONARY ARTERY OF NATIVE HEART WITHOUT ANGINA PECTORIS: Primary | ICD-10-CM

## 2025-01-02 DIAGNOSIS — E78.5 DYSLIPIDEMIA: ICD-10-CM

## 2025-01-02 PROCEDURE — 3078F DIAST BP <80 MM HG: CPT | Performed by: NURSE PRACTITIONER

## 2025-01-02 PROCEDURE — 1036F TOBACCO NON-USER: CPT | Performed by: NURSE PRACTITIONER

## 2025-01-02 PROCEDURE — G8417 CALC BMI ABV UP PARAM F/U: HCPCS | Performed by: NURSE PRACTITIONER

## 2025-01-02 PROCEDURE — 99214 OFFICE O/P EST MOD 30 MIN: CPT | Performed by: NURSE PRACTITIONER

## 2025-01-02 PROCEDURE — 3074F SYST BP LT 130 MM HG: CPT | Performed by: NURSE PRACTITIONER

## 2025-01-02 PROCEDURE — 3017F COLORECTAL CA SCREEN DOC REV: CPT | Performed by: NURSE PRACTITIONER

## 2025-01-02 PROCEDURE — G8427 DOCREV CUR MEDS BY ELIG CLIN: HCPCS | Performed by: NURSE PRACTITIONER

## 2025-01-02 RX ORDER — LOSARTAN POTASSIUM 50 MG/1
50 TABLET ORAL DAILY
COMMUNITY
Start: 2024-11-12

## 2025-01-02 ASSESSMENT — ENCOUNTER SYMPTOMS
SORE THROAT: 0
COUGH: 0
CHEST TIGHTNESS: 0
SHORTNESS OF BREATH: 0
WHEEZING: 0

## 2025-01-02 NOTE — PROGRESS NOTES
[facility]  [address]  [address]  [facility] [id number] [phone]      Chief Complaint / Reason for Being Seen: Hospital follow-up    1. Coronary artery disease involving native coronary artery of native heart without angina pectoris    2. Essential hypertension    3. Dyslipidemia          Patient with a history of CAD status post CABG robotic x 2 on 11/8/2024.    Patient had had an abnormal stress test followed by left heart cath that showed LAD and diagonal disease.  It was not amenable to PCI.  She was referred to cardiac surgery.    Patient with a history of severe obesity, GERI on CPAP, diabetes, end-stage renal disease on hemodialysis.  Family history of premature CAD, prior history of DVT with DVT filter, hypertension and dyslipidemia.    Patient presents to clinic today for hospital follow-up.  Patient accompanied by .  Patient has been released from CT surgery.  She still is having some incisional discomfort.  No other issues.  Did discuss cardiac rehab and she is willing to do that here at our facility.    Subjective:    Old records have been obtained from the referring providers.  Those records have been reviewed and summarized.      Sara Pardo is a 64 y.o. female with the following history as recorded in [facility]:  Patient Active Problem List    Diagnosis Date Noted    Anemia in end-stage renal disease (HCC) 02/09/2023    Great toe pain, left 01/11/2023    Colitis 10/22/2022    Pyelonephritis 06/25/2022    CAD in native artery 11/07/2024    Coronary artery disease 11/06/2024    Positive cardiac stress test 10/30/2024    Leg cramps 10/14/2024    Dysuria 10/14/2024    Mitral stenosis 10/08/2024    Nonrheumatic aortic valve insufficiency 04/16/2024    Hypersomnia 03/06/2024    Right carpal tunnel syndrome 08/02/2023    Left carpal tunnel syndrome 05/24/2023    Gastric polyps 04/11/2023    Right trigeminal neuralgia 04/06/2023    Extreme obesity 03/30/2023    Non-restorative sleep

## 2025-01-07 NOTE — PROGRESS NOTES
[facility] - PODIATRY    Today's Date: 01/08/2025     Patient Name: Maria C Shrestha  MRN: [id number]  CSN: [id number]  PCP: Fatmata Wallace MD  Referring Provider: No ref. provider found    SUBJECTIVE     Chief Complaint   Patient presents with    Follow-up     Fatmata Wallace MD 10/14/24  Return in about 3 months (around 12/26/2024) for Follow-up with Podiatry Provider, Schedule Foot Care Clinic. IN DIABETIC FOOT [facility]   Pt states she is here today for diabetic foot/nail care. Pt denies pain     Diabetes     HPI: Maria C Shrestha, a 64 y.o.female, comes to clinic as a(n) established patient presenting for diabetic foot exam and complaining of toenail/callus issues. Patient has h/o hypothyroidism, anemia, anxiety, arthritis, cellulitis, CKD, depression, thyroid disease, DM, HLD, HTN, fatigue, gallbladder abscess, obesity .  Patient presents with toenails that are thick, long, and irregular.  Patient is IDDM and unsure of last BG level. States that glucose is typically well controlled.  Continues to note difficulty caring for nails due to thickness.  Patient reports she has difficulty with ambulation due to knee problems.  She is currently using a wheelchair for ambulation support today.  Reports decreased sensation in her feet.  Denies pain .  Denies open sores or wounds.  Notes continued dryness to both of her feet.  Reports pain to touch to bilateral retrocalcaneal exostosis.  Reports she must use blankets and pillows to relieve heel pain while doing dialysis.  Relates previous treatment(s) including care by podiatry . Denies any constitutional symptoms. No other pedal complaints at this time.    Past Medical History:   Diagnosis Date    Acquired hypothyroidism 02/06/2017    Allergic rhinitis     Anemia due to stage 4 chronic kidney disease 08/18/2020    Anxiety     Arthritis     Carpal tunnel syndrome 07/2023    Cellulitis     CHF (congestive heart failure)     Chronic kidney  disease     3rd stage    Depression     Dialysis patient     AV fistula in Left arm    Dyslipidemia     Elevated cholesterol     Essential hypertension     Fatigue     Gallbladder abscess     Hearing loss     History of transfusion     Insulin pump in place     Omnipod    Light headed     Limited range of motion (ROM) of shoulder     right    Obesity     Obstructive sleep apnea treated with continuous positive airway pressure (CPAP)     Palpitation     Short of breath on exertion     Sinusitis     Stage 4 chronic kidney disease 09/16/2020    Type 2 diabetes mellitus     Type II diabetes mellitus, uncontrolled     Wears glasses      Past Surgical History:   Procedure Laterality Date    ADENOIDECTOMY      ANAL FISTULA REPAIR Left     x 2     ARTERIOVENOUS FISTULA Left 08/2021    CARPAL TUNNEL RELEASE Left 05/24/2023    Procedure: LEFT OPEN CARPAL TUNNEL RELEASE;  Surgeon: Juno Ledbetter MD;  Location: [facility] OR;  Service: Orthopedics;  Laterality: Left;    CARPAL TUNNEL RELEASE Right 8/2/2023    Procedure: RIGHT OPEN CARPAL TUNNEL RELEASE;  Surgeon: Juno Ledbetter MD;  Location: [facility] OR;  Service: Orthopedics;  Laterality: Right;    CHOLECYSTECTOMY      COLONOSCOPY  01/02/2014    COLONOSCOPY N/A 03/22/2017    Procedure: COLONOSCOPY WITH ANESTHESIA;  Surgeon: Mono Linder MD;  Location: [facility] ENDOSCOPY;  Service:     COLONOSCOPY N/A 05/09/2022    Procedure: COLONOSCOPY WITH ANESTHESIA;  Surgeon: Mono Linder MD;  Location: [facility] ENDOSCOPY;  Service: Gastroenterology;  Laterality: N/A;  pre polyp;brbpr  post  Dr. Soliman    D & C HYSTEROSCOPY N/A 07/25/2018    Procedure: DILATATION AND CURETTAGE HYSTEROSCOPY;  Surgeon: Michael Castaneda MD;  Location: [facility] OR;  Service: Obstetrics/Gynecology    DILATATION AND CURETTAGE      X 2    ENDOSCOPY      ENDOSCOPY N/A 05/09/2022    Procedure: ESOPHAGOGASTRODUODENOSCOPY WITH ANESTHESIA;  Surgeon: Mono Linder MD;  Location: [facility] ENDOSCOPY;  Service:  Gastroenterology;  Laterality: N/A;  pre screen  post gastric polyps  Dr. Soliman    ENDOSCOPY N/A 11/28/2022    Procedure: ESOPHAGOGASTRODUODENOSCOPY WITH ANESTHESIA;  Surgeon: Mono Linder MD;  Location: [facility] ENDOSCOPY;  Service: Gastroenterology;  Laterality: N/A;  pre: hx gastric polyp  post: Retained food, gastritis  dr sebastián soliman    ENDOSCOPY N/A 12/29/2022    Procedure: ESOPHAGOGASTRODUODENOSCOPY;  Surgeon: Mono Linder MD;  Location: [facility] ENDOSCOPY;  Service: Gastroenterology;  Laterality: N/A;  preop; hx of polyps  postop gastric polyps  Bette Coffman MD    ENDOSCOPY N/A 06/07/2023    Procedure: ESOPHAGOGASTRODUODENOSCOPY;  Surgeon: Mono Linder MD;  Location: [facility] ENDOSCOPY;  Service: Gastroenterology;  Laterality: N/A;  pre hx gastric polyp  post normal  dr bette brewer    ENDOSCOPY N/A 6/13/2024    Procedure: ESOPHAGOGASTRODUODENOSCOPY WITH ANESTHESIA;  Surgeon: Mono Linder MD;  Location: [facility] ENDOSCOPY;  Service: Gastroenterology;  Laterality: N/A;  pre: gastric polyps  post: gastric polyp.   roseline-osman    HYSTERECTOMY      TONSILLECTOMY       Family History   Problem Relation Age of Onset    Diabetes Other     Heart failure Other     Cancer Other     Kidney disease Other     Lung cancer Mother     Heart disease Father     Diabetes Father     Obesity Father     Stroke Father     Prostate cancer Father     Cancer Maternal Grandmother     Uterine cancer Maternal Grandmother     Diabetes Maternal Grandfather     Cancer Paternal Grandmother     Colon cancer Paternal Grandmother     Diabetes Paternal Grandfather     Heart disease Paternal Grandfather     No Known Problems Sister     No Known Problems Brother     No Known Problems Daughter     No Known Problems Maternal Aunt     No Known Problems Paternal Aunt     BRCA 1/2 Neg Hx     Breast cancer Neg Hx     Endometrial cancer Neg Hx     Ovarian cancer Neg Hx      Social History     Socioeconomic History     Marital status:    Tobacco Use    Smoking status: Never    Smokeless tobacco: Never   Vaping Use    Vaping status: Never Used   Substance and Sexual Activity    Alcohol use: No    Drug use: No    Sexual activity: Defer     Birth control/protection: Post-menopausal, Hysterectomy     Allergies   Allergen Reactions    Codeine Nausea And Vomiting and GI Intolerance     Current Outpatient Medications   Medication Sig Dispense Refill    allopurinol (ZYLOPRIM) 100 MG tablet Take 1 tablet by mouth Daily. 30 tablet 11    aspirin 81 MG EC tablet Take 1 tablet by mouth Daily.      buPROPion SR (WELLBUTRIN SR) 100 MG 12 hr tablet       calcitriol (ROCALTROL) 0.5 MCG capsule Take 1 capsule by mouth Daily.      carvedilol (COREG) 3.125 MG tablet Take 1 tablet by mouth 2 (Two) Times a Day.      cetirizine (zyrTEC) 10 MG tablet Take 1 tablet by mouth.      citalopram (CeleXA) 40 MG tablet Take 1 tablet by mouth Daily. 30 tablet 0    diazePAM (VALIUM) 2 MG tablet Take 1 tablet by mouth 3 (Three) Times a Day.      famotidine (PEPCID) 20 MG tablet Take 2 tablets by mouth 2 (Two) Times a Day. 120 tablet 11    fluticasone (FLONASE) 50 MCG/ACT nasal spray Administer 1 spray into the nostril(s) as directed by provider 2 (Two) Times a Day.      Heparin Sod, Pork, Lock Flush (Heparin Na, Pork, Lock Flsh PF) 1 UNIT/ML solution lock flush 6,000 mL by Intracatheter route Every 8 (Eight) Hours. Administers before every dialysis treatment, which is 4-5 times a week at home      Insulin Disposable Pump (Omnipod 5 G6 Pod, Gen 5,) mis 1 each Every Other Day. 15 each 6    Insulin Lispro, 1 Unit Dial, (HumaLOG KwikPen) 100 UNIT/ML solution pen-injector Inject  under the skin into the appropriate area as directed Daily.      lamoTRIgine (LaMICtal) 100 MG tablet 1/2 QD      losartan (COZAAR) 50 MG tablet Take 1 tablet by mouth Daily.      Misc. Devices (Bariatric Rollator) misc Please provide device for patient 1 each 0    Repatha solution  prefilled syringe injection Inject 1 mL under theskin into the appropriate area as directed Every 14 (Fourteen) Days. 2 mL 4    rosuvastatin (CRESTOR) 20 MG tablet Take 1 tablet by mouth Every Night. 30 tablet 0    Semaglutide, 2 MG/DOSE, (Ozempic, 2 MG/DOSE,) 8 MG/3ML solution pen-injector Inject 2 mg under the skin into the appropriate area as directed 1 (One) Time Per Week. Please cancel Trulicity 3 mL 3    sevelamer (RENVELA) 800 MG tablet Take 4 tablets with each meal and 2 tablets with a snack      Continuous Blood Gluc Sensor (FreeStyle Filippo 2 Sensor) misc 1 each Every 14 (Fourteen) Days. Left arm (Patient not taking: Reported on 1/8/2025) 2 each 3    HUMULIN R 500 UNIT/ML CONCENTRATED injection INJECT UNDER THE SKIN INTO THE APPROPRIATE AREA 3 TIMES A DAY BEFORE MEALS (Patient not taking: Reported on 1/8/2025) 40 mL 11    levothyroxine (Synthroid) 112 MCG tablet Take 1 tablet by mouth Daily. 30 tablet 11    lisinopril (PRINIVIL,ZESTRIL) 20 MG tablet 2 (Two) Times a Day. (Patient not taking: Reported on 1/8/2025)      Multiple Vitamins-Minerals (ProRenal + D w/ Omega-3) capsule Take 1 capsule by mouth Daily. (Patient not taking: Reported on 1/8/2025)      potassium chloride (KLOR-CON M20) 20 MEQ CR tablet  (Patient not taking: Reported on 1/8/2025)      traZODone (DESYREL) 100 MG tablet Take 1 tablet by mouth Every Night.       No current facility-administered medications for this visit.     Review of Systems   Constitutional:  Negative for chills and fever.   HENT:  Negative for congestion.    Respiratory:  Negative for shortness of breath.    Cardiovascular:  Positive for leg swelling. Negative for chest pain.   Gastrointestinal:  Negative for constipation, diarrhea, nausea and vomiting.   Musculoskeletal:  Positive for arthralgias and gait problem.        Foot pain   Skin:  Negative for wound.   Neurological:  Positive for numbness. Negative for dizziness and weakness.   Hematological:  Does not  bruise/bleed easily.   Psychiatric/Behavioral:  Negative for agitation, behavioral problems and confusion.        OBJECTIVE     Vitals:    25 0841   BP: 130/70   Pulse: 80   SpO2: 98%     PHYSICAL EXAM  GEN:   Accompanied by none.     Foot/Ankle Exam    GENERAL  Diabetic foot exam performed    Appearance:  obese  Orientation:  AAOx3  Affect:  appropriate  Gait:  (Unsteady)  Assistance:  wheelchair  Right shoe gear: casual shoe  Left shoe gear: casual shoe    VASCULAR     Right Foot Vascularity   Dorsalis pedis:  2+  Posterior tibial:  2+  Skin temperature:  warm  Edema gradin+ and non-pitting  CFT:  3  Pedal hair growth:  Present  Varicosities:  none     Left Foot Vascularity   Dorsalis pedis:  2+  Posterior tibial:  2+  Skin temperature:  warm  Edema gradin+ and non-pitting  CFT:  3  Pedal hair growth:  Present  Varicosities:  none     NEUROLOGIC     Right Foot Neurologic   Light touch sensation: diminished  Vibratory sensation: normal  Hot/Cold sensation: normal  Protective Sensation using [place]-Laura Monofilament:   Sites intact: 8  Sites tested: 10     Left Foot Neurologic   Light touch sensation: diminished  Vibratory sensation: normal  Hot/Cold sensation:  normal  Protective Sensation using [place]-Laura Monofilament:   Sites intact: 8  Sites tested: 10    MUSCULOSKELETAL     Right Foot Musculoskeletal   Ecchymosis:  none  Tenderness:  toenail problem    Arch:  Normal  Hallux valgus: No       Left Foot Musculoskeletal   Ecchymosis:  none  Tenderness:  toenail problem  Arch:  Normal  Hallux valgus: No      MUSCLE STRENGTH     Right Foot Muscle Strength   Foot dorsiflexion:  5  Foot plantar flexion:  5  Foot inversion:  5  Foot eversion:  5     Left Foot Muscle Strength   Foot dorsiflexion:  5  Foot plantar flexion:  5  Foot inversion:  5  Foot eversion:  5    RANGE OF MOTION     Right Foot Range of Motion   Foot and ankle ROM within normal limits       Left Foot Range of Motion   Foot  and ankle ROM within normal limits      DERMATOLOGIC      Right Foot Dermatologic   Skin  Right foot skin is intact. Negative for corn and tinea.   Nails  1.  Positive for onychomycosis, abnormal thickness and subungual debris. (Callus to nail bed)  2.  Positive for abnormal thickness and subungual debris.  3.  Positive for onychomycosis, abnormal thickness and subungual debris.  4.  Positive for onychomycosis, abnormal thickness and subungual debris.  5.  Positive for onychomycosis, abnormal thickness and subungual debris.     Left Foot Dermatologic   Skin  Left foot skin is intact. Negative for corn and tinea.   Nails  1.  Positive for elongated, onychomycosis, abnormal thickness and subungual debris.  2.  Positive for onychomycosis, abnormal thickness and subungual debris.  3.  Positive for onychomycosis, abnormal thickness, subungual debris and dystrophic nail.  4.  Positive for onychomycosis, abnormally thick and subungual debris.  5.  Positive for onychomycosis, abnormally thick and subungual debris.    Image:       RADIOLOGY/NUCLEAR:  No results found.    LABORATORY/CULTURE RESULTS:      PATHOLOGY RESULTS:       ASSESSMENT/PLAN     Diagnoses and all orders for this visit:    1. Onychomycosis (Primary)    2. Type 2 diabetes mellitus with diabetic polyneuropathy, without long-term current use of insulin    3. Stage 5 chronic kidney disease on chronic dialysis    4. Gait abnormality    5. Type 2 diabetes mellitus with ESRD (end-stage renal disease)    6. Encounter for diabetic foot exam    7. Retrocalcaneal exostosis    8. Left foot pain      Comprehensive lower extremity examination and evaluation was performed.  Discussed findings and treatment plan including risks, benefits, and treatment options with patient in detail. Patient agreed with treatment plan.  Diabetic foot exam performed.  After verbal consent obtained, nail(s) x9 debrided of length and thickness with nail nipper without incidence  After verbal  consent obtained, calluses x1 pared utilizing dermal curette and/or scalpel without incidence  Patient may maintain nails and calluses at home utilizing emery board or pumice stone between visits as needed  Reviewed at home diabetic foot care including daily foot checks   Toe cap x 1 dispensed.  Patient is at an increased risk of foot complications due to chronic kidney disease.    Continue to follow up with PCP for treatment of CKD and routine labs.    Discussed possible surgical intervention versus conservative therapy for retrocalcaneal exostosis pain.  Patient requests conservative therapy at this time and the patient will contact the office if she would like to have an appointment set up with Dr. Wong to discuss surgical interventions.  Discussed with patient that heel lift boots would be beneficial to help with pressure distribution to bilateral retrocalcaneal exostosis.  Patient was given foam metatarsal pads to cut and put around the heel  to relieve pressure.   Continue diabetic monitoring and control under direction of PCP.  Continue to elevate lower extremities while at rest.   Continue use of wheelchair for ambulation support.  An After Visit Summary was printed and given to the patient at discharge, including (if requested) any available informative/educational handouts regarding diagnosis, treatment, or medications. All questions were answered to patient/family satisfaction. Should symptoms fail to improve or worsen they agree to call or return to clinic or to go to the Emergency Department. Discussed the importance of following up with any needed screening tests/labs/specialist appointments and any requested follow-up recommended by me today. Importance of maintaining follow-up discussed and patient accepts that missed appointments can delay diagnosis and potentially lead to worsening of conditions.   IReturn in about 10 weeks (around 3/19/2025) for Schedule Foot Care Clinic, With Bria CHAPMAN,  or sooner if acute issues arise.        This document has been electronically signed by FLOR Byrd on January 8, 2025 09:58 CST

## 2025-01-08 ENCOUNTER — OFFICE VISIT (OUTPATIENT)
Age: 65
End: 2025-01-08
Payer: MEDICARE

## 2025-01-08 VITALS
OXYGEN SATURATION: 98 % | HEART RATE: 80 BPM | BODY MASS INDEX: 47.09 KG/M2 | SYSTOLIC BLOOD PRESSURE: 130 MMHG | HEIGHT: 66 IN | DIASTOLIC BLOOD PRESSURE: 70 MMHG | WEIGHT: 293 LBS

## 2025-01-08 DIAGNOSIS — N18.6 TYPE 2 DIABETES MELLITUS WITH ESRD (END-STAGE RENAL DISEASE): ICD-10-CM

## 2025-01-08 DIAGNOSIS — M79.672 LEFT FOOT PAIN: ICD-10-CM

## 2025-01-08 DIAGNOSIS — R26.9 GAIT ABNORMALITY: ICD-10-CM

## 2025-01-08 DIAGNOSIS — B35.1 ONYCHOMYCOSIS: Primary | ICD-10-CM

## 2025-01-08 DIAGNOSIS — E11.42 TYPE 2 DIABETES MELLITUS WITH DIABETIC POLYNEUROPATHY, WITHOUT LONG-TERM CURRENT USE OF INSULIN: ICD-10-CM

## 2025-01-08 DIAGNOSIS — M89.8X7 RETROCALCANEAL EXOSTOSIS: ICD-10-CM

## 2025-01-08 DIAGNOSIS — E11.9 ENCOUNTER FOR DIABETIC FOOT EXAM: ICD-10-CM

## 2025-01-08 DIAGNOSIS — N18.6 STAGE 5 CHRONIC KIDNEY DISEASE ON CHRONIC DIALYSIS: ICD-10-CM

## 2025-01-08 DIAGNOSIS — E11.22 TYPE 2 DIABETES MELLITUS WITH ESRD (END-STAGE RENAL DISEASE): ICD-10-CM

## 2025-01-08 DIAGNOSIS — Z99.2 STAGE 5 CHRONIC KIDNEY DISEASE ON CHRONIC DIALYSIS: ICD-10-CM

## 2025-01-08 RX ORDER — INSULIN LISPRO 100 [IU]/ML
INJECTION, SOLUTION INTRAVENOUS; SUBCUTANEOUS DAILY
COMMUNITY
Start: 2024-09-25

## 2025-01-14 ENCOUNTER — TELEPHONE (OUTPATIENT)
Dept: CARDIAC REHAB | Age: 65
End: 2025-01-14

## 2025-01-14 ENCOUNTER — TELEPHONE (OUTPATIENT)
Dept: HEMATOLOGY | Age: 65
End: 2025-01-14

## 2025-01-14 NOTE — TELEPHONE ENCOUNTER
Called pt. to remind them of appointment on 01/16/2025 and had to leave a detailed voicemail with appointment date and time. Reminded patient to just come at appointment time, and to not come at the lab appointment time. Reminded patient that we will not check them in any more than 30 minutes before appointment time. We have now moved to the [facility] that is located between our old office and the ER at the [facility]. Letting the Pt know that our front entrance faces the [employer] fields and leaving our address. Reminded pt to eat well and be well hydrated for their labs.

## 2025-01-15 ENCOUNTER — TELEPHONE (OUTPATIENT)
Dept: PSYCHIATRY | Age: 65
End: 2025-01-15

## 2025-01-15 DIAGNOSIS — D72.829 LEUKOCYTOSIS, UNSPECIFIED TYPE: Primary | ICD-10-CM

## 2025-01-15 DIAGNOSIS — D63.1 ANEMIA, CHRONIC RENAL FAILURE, STAGE 5 (HCC): ICD-10-CM

## 2025-01-15 DIAGNOSIS — N18.5 ANEMIA, CHRONIC RENAL FAILURE, STAGE 5 (HCC): ICD-10-CM

## 2025-01-15 NOTE — PROGRESS NOTES
Progress Note      Pt Name: Sara Pardo  YOB: 1960  MRN: [id number]    Date of evaluation: 1/16/2025  History Obtained From:  patient, spouse, electronic medical record    CHIEF COMPLAINT:    Chief Complaint   Patient presents with    Follow-up     Leukocytosis, unspecified type       Current active problems  Reactive leukocytosis  Anemia secondary to stage V chronic renal failure    HISTORY OF PRESENT ILLNESS:    Sara Pardo is a 64 y.o.  female seen initially in the office on 3/30/2023 for persistent leukocytosis.  Serology was unrevealing, this is most likely reactive.      She has anemia secondary to chronic renal failure on home HD receiving JEAN CLAUDE and iron.    She had significant coronary artery disease and is status post robotic CABG by Dr. Morataya on 11/8/2024.    She is recovering from a URI.    She is diabetic, most recent A1c was 6.5 on 11/10/2024.    She has seasonal allergies and takes Zyrtec as needed.  Anxiety is controlled with Wellbutrin 150 mg daily, Celexa 40 mg daily, Valium 2 mg as needed.  She has hypothyroidism which has been stable on levothyroxine 112 mcg daily.      HEMATOLOGY HISTORY   Sara was seen in initial hematology consultation on 3/30/2023 referred by Dr. Haskins for persistent leukocytosis.      She has stage V chronic renal failure from diabetic nephropathy as well as hypertensive nephrosclerosis on chronic hemodialysis since 2021.  He does receive Mircera every 2 weeks.  He previously received IV iron as needed.     She previously was followed by [facility] hematology where she received JEAN CLAUDE prior to starting on hemodialysis.  They released her once she started getting her Mircera with dialysis.  Her prior work-up through [facility] hematology included a bone marrow procedure.     CT-guided bone marrow 6/23/2021  Normocellular bone marrow (40-50% cellularity) with maturing trilineage hematopoiesis  No evidence of any involvement by lymphoma  Plasma cells

## 2025-01-15 NOTE — TELEPHONE ENCOUNTER
Called patient to remind them of their appointment   -left voicemail, requesting a return call  Reminded patient to complete their visit pre-check/digital registration in [employer].    Electronically signed by Belen Carolina MA on 1/15/2025 at 12:37 PM

## 2025-01-16 ENCOUNTER — TELEPHONE (OUTPATIENT)
Dept: CARDIAC REHAB | Age: 65
End: 2025-01-16

## 2025-01-16 ENCOUNTER — HOSPITAL ENCOUNTER (OUTPATIENT)
Dept: INFUSION THERAPY | Age: 65
Discharge: HOME | End: 2025-01-16
Payer: MEDICARE

## 2025-01-16 ENCOUNTER — OFFICE VISIT (OUTPATIENT)
Dept: HEMATOLOGY | Age: 65
End: 2025-01-16
Payer: MEDICARE

## 2025-01-16 ENCOUNTER — OFFICE VISIT (OUTPATIENT)
Dept: PSYCHIATRY | Age: 65
End: 2025-01-16

## 2025-01-16 VITALS
HEART RATE: 86 BPM | DIASTOLIC BLOOD PRESSURE: 66 MMHG | BODY MASS INDEX: 44.15 KG/M2 | WEIGHT: 265 LBS | SYSTOLIC BLOOD PRESSURE: 141 MMHG | TEMPERATURE: 97.3 F | OXYGEN SATURATION: 98 % | HEIGHT: 65 IN

## 2025-01-16 VITALS
HEIGHT: 65 IN | OXYGEN SATURATION: 99 % | WEIGHT: 265 LBS | SYSTOLIC BLOOD PRESSURE: 118 MMHG | DIASTOLIC BLOOD PRESSURE: 62 MMHG | TEMPERATURE: 97.5 F | BODY MASS INDEX: 44.15 KG/M2 | HEART RATE: 82 BPM

## 2025-01-16 DIAGNOSIS — G47.00 INSOMNIA, UNSPECIFIED TYPE: ICD-10-CM

## 2025-01-16 DIAGNOSIS — N18.5 ANEMIA, CHRONIC RENAL FAILURE, STAGE 5 (HCC): ICD-10-CM

## 2025-01-16 DIAGNOSIS — D72.829 LEUKOCYTOSIS, UNSPECIFIED TYPE: Primary | ICD-10-CM

## 2025-01-16 DIAGNOSIS — D63.1 ANEMIA, CHRONIC RENAL FAILURE, STAGE 5 (HCC): ICD-10-CM

## 2025-01-16 DIAGNOSIS — D72.829 LEUKOCYTOSIS, UNSPECIFIED TYPE: ICD-10-CM

## 2025-01-16 DIAGNOSIS — F33.0 MAJOR DEPRESSIVE DISORDER, RECURRENT, MILD (HCC): Primary | ICD-10-CM

## 2025-01-16 DIAGNOSIS — F41.1 GENERALIZED ANXIETY DISORDER: ICD-10-CM

## 2025-01-16 LAB
BASOPHILS # BLD: 0.07 K/UL (ref 0–0.2)
BASOPHILS NFR BLD: 0.6 % (ref 0–1)
EOSINOPHIL # BLD: 0.43 K/UL (ref 0–0.6)
EOSINOPHIL NFR BLD: 3.7 % (ref 0–5)
ERYTHROCYTE [DISTWIDTH] IN BLOOD BY AUTOMATED COUNT: 17.2 % (ref 11.5–14.5)
HCT VFR BLD AUTO: 38 % (ref 37–47)
HGB BLD-MCNC: 12.3 G/DL (ref 12–16)
LYMPHOCYTES # BLD: 1.63 K/UL (ref 1.1–4.5)
LYMPHOCYTES NFR BLD: 14.1 % (ref 20–40)
MCH RBC QN AUTO: 30.1 PG (ref 27–31)
MCHC RBC AUTO-ENTMCNC: 32.4 G/DL (ref 33–37)
MCV RBC AUTO: 93.1 FL (ref 81–99)
MONOCYTES # BLD: 0.76 K/UL (ref 0–0.9)
MONOCYTES NFR BLD: 6.6 % (ref 1–10)
NEUTROPHILS # BLD: 8.64 K/UL (ref 1.5–7.5)
NEUTS SEG NFR BLD: 74.4 % (ref 50–65)
PLATELET # BLD AUTO: 209 K/UL (ref 130–400)
PMV BLD AUTO: 9.4 FL (ref 9.4–12.3)
RBC # BLD AUTO: 4.08 M/UL (ref 4.2–5.4)
WBC # BLD AUTO: 11.6 K/UL (ref 4.8–10.8)

## 2025-01-16 PROCEDURE — 3078F DIAST BP <80 MM HG: CPT | Performed by: PHYSICIAN ASSISTANT

## 2025-01-16 PROCEDURE — 85025 COMPLETE CBC W/AUTO DIFF WBC: CPT

## 2025-01-16 PROCEDURE — 3074F SYST BP LT 130 MM HG: CPT | Performed by: PHYSICIAN ASSISTANT

## 2025-01-16 PROCEDURE — 1036F TOBACCO NON-USER: CPT | Performed by: PHYSICIAN ASSISTANT

## 2025-01-16 PROCEDURE — 3017F COLORECTAL CA SCREEN DOC REV: CPT | Performed by: PHYSICIAN ASSISTANT

## 2025-01-16 PROCEDURE — 99212 OFFICE O/P EST SF 10 MIN: CPT

## 2025-01-16 PROCEDURE — G8417 CALC BMI ABV UP PARAM F/U: HCPCS | Performed by: PHYSICIAN ASSISTANT

## 2025-01-16 PROCEDURE — G8427 DOCREV CUR MEDS BY ELIG CLIN: HCPCS | Performed by: PHYSICIAN ASSISTANT

## 2025-01-16 PROCEDURE — 36415 COLL VENOUS BLD VENIPUNCTURE: CPT

## 2025-01-16 PROCEDURE — 99213 OFFICE O/P EST LOW 20 MIN: CPT | Performed by: PHYSICIAN ASSISTANT

## 2025-01-16 ASSESSMENT — PATIENT HEALTH QUESTIONNAIRE - PHQ9
SUM OF ALL RESPONSES TO PHQ QUESTIONS 1-9: 10
2. FEELING DOWN, DEPRESSED OR HOPELESS: NOT AT ALL
10. IF YOU CHECKED OFF ANY PROBLEMS, HOW DIFFICULT HAVE THESE PROBLEMS MADE IT FOR YOU TO DO YOUR WORK, TAKE CARE OF THINGS AT HOME, OR GET ALONG WITH OTHER PEOPLE: SOMEWHAT DIFFICULT
6. FEELING BAD ABOUT YOURSELF - OR THAT YOU ARE A FAILURE OR HAVE LET YOURSELF OR YOUR FAMILY DOWN: SEVERAL DAYS
SUM OF ALL RESPONSES TO PHQ9 QUESTIONS 1 & 2: 2
7. TROUBLE CONCENTRATING ON THINGS, SUCH AS READING THE NEWSPAPER OR WATCHING TELEVISION: SEVERAL DAYS
5. POOR APPETITE OR OVEREATING: NOT AT ALL
4. FEELING TIRED OR HAVING LITTLE ENERGY: NEARLY EVERY DAY
9. THOUGHTS THAT YOU WOULD BE BETTER OFF DEAD, OR OF HURTING YOURSELF: NOT AT ALL
SUM OF ALL RESPONSES TO PHQ QUESTIONS 1-9: 10
8. MOVING OR SPEAKING SO SLOWLY THAT OTHER PEOPLE COULD HAVE NOTICED. OR THE OPPOSITE, BEING SO FIGETY OR RESTLESS THAT YOU HAVE BEEN MOVING AROUND A LOT MORE THAN USUAL: NOT AT ALL
SUM OF ALL RESPONSES TO PHQ QUESTIONS 1-9: 10
3. TROUBLE FALLING OR STAYING ASLEEP: NEARLY EVERY DAY
1. LITTLE INTEREST OR PLEASURE IN DOING THINGS: MORE THAN HALF THE DAYS
SUM OF ALL RESPONSES TO PHQ QUESTIONS 1-9: 10

## 2025-01-16 ASSESSMENT — ENCOUNTER SYMPTOMS
PHOTOPHOBIA: 0
BLOOD IN STOOL: 0
TROUBLE SWALLOWING: 0
EYE DISCHARGE: 0
SORE THROAT: 0
COUGH: 1
ABDOMINAL DISTENTION: 0
DIARRHEA: 0
CONSTIPATION: 0
COLOR CHANGE: 0
ABDOMINAL PAIN: 0
VOICE CHANGE: 0
SHORTNESS OF BREATH: 1
EYE ITCHING: 0
NAUSEA: 0
BACK PAIN: 1
VOMITING: 0
WHEEZING: 0

## 2025-01-16 NOTE — TELEPHONE ENCOUNTER
Spoke with patient via phone.  Patient is now scheduled for a Cardiac Rehab \"Orientation & Assessment\" on 1/23/2025 @ 1000.

## 2025-01-16 NOTE — PROGRESS NOTES
DOMICILED: has stairs in her home to the den and washer which she does not use     Social Determinants of Health     Financial Resource Strain: Medium Risk (11/7/2024)    Overall Financial Resource Strain (CARDIA)     Difficulty of Paying Living Expenses: Somewhat hard   Food Insecurity: Food Insecurity Present (11/7/2024)    Hunger Vital Sign     Worried About Running Out of Food in the Last Year: Sometimes true     Ran Out of Food in the Last Year: Sometimes true   Physical Activity: Unknown (11/7/2024)    Physical Activity ([facility] HRSN)     In the last 30 days, other than the activities you did for work, on average, how many days per week did you engage in moderate exercise (like walking fast, running, jogging, dancing, swimming, biking, or other similar activites)?: 0     Number of minutes of exercise per week:: 0   Social Connections: Socially Integrated (11/7/2024)    Social Connections ([facility] HRSN)     If for any reason you need help with day-to-day activities such as bathing, preparing meals, shopping, managing finances, etc., do you get the help you need?: I get all the help I need   Intimate Partner Violence: Not At Risk (6/13/2024)    Received from [facility], [facility]    Abuse Screen     Feels Unsafe at Home or Work/School: no     Feels Threatened by Someone: no     Does Anyone Try to Keep You From Having Contact with Others or Doing Things Outside Your Home?: no     Physical Signs of Abuse Present: no       MSE:  Appearance: Appropriately groomed. Made good eye contact. In a WC.  No abnormal movements or tremor.  Behavior: Calm, cooperative, and socially appropriate. No psychomotor retardation/agitation appreciated.   Speech: Normal in tone, volume, and quality. No slurring, dysarthria or pressured speech noted.   Mood: \"ok\"   Affect: Mood congruent.   Thought Process: Appears linear, logical and goal oriented. Causality appears intact.   Thought Content: Denies active

## 2025-01-29 ENCOUNTER — TELEPHONE (OUTPATIENT)
Dept: CARDIOLOGY CLINIC | Age: 65
End: 2025-01-29

## 2025-01-29 NOTE — TELEPHONE ENCOUNTER
Patient called stating she has been having issues with low blood pressure, losing balance, and falling. She spoke with someone at [facility] that told her to stop medications. She takes carvedilol and losartan and these are prescribed by two different doctors. She is getting conflicting advise from many different doctors and is frustrated with that. She states that Dr. Sandoval originally prescribed the carvedilol and Dr. Regan's office orders the losartan. Many changes have been made and she wants to be seen to get medications straightened out. Appt scheduled for 2.3.25 with Coco. Advised pt to bring list of BP readings to that appt.

## 2025-02-03 ENCOUNTER — OFFICE VISIT (OUTPATIENT)
Dept: CARDIOLOGY CLINIC | Age: 65
End: 2025-02-03
Payer: MEDICARE

## 2025-02-03 VITALS
DIASTOLIC BLOOD PRESSURE: 72 MMHG | BODY MASS INDEX: 44.65 KG/M2 | OXYGEN SATURATION: 99 % | HEART RATE: 79 BPM | SYSTOLIC BLOOD PRESSURE: 128 MMHG | WEIGHT: 268 LBS | HEIGHT: 65 IN

## 2025-02-03 DIAGNOSIS — I25.10 CORONARY ARTERY DISEASE INVOLVING NATIVE CORONARY ARTERY OF NATIVE HEART WITHOUT ANGINA PECTORIS: Primary | ICD-10-CM

## 2025-02-03 DIAGNOSIS — I10 ESSENTIAL HYPERTENSION: ICD-10-CM

## 2025-02-03 DIAGNOSIS — E78.5 DYSLIPIDEMIA: ICD-10-CM

## 2025-02-03 PROCEDURE — 1036F TOBACCO NON-USER: CPT | Performed by: NURSE PRACTITIONER

## 2025-02-03 PROCEDURE — 3017F COLORECTAL CA SCREEN DOC REV: CPT | Performed by: NURSE PRACTITIONER

## 2025-02-03 PROCEDURE — 99214 OFFICE O/P EST MOD 30 MIN: CPT | Performed by: NURSE PRACTITIONER

## 2025-02-03 PROCEDURE — G8427 DOCREV CUR MEDS BY ELIG CLIN: HCPCS | Performed by: NURSE PRACTITIONER

## 2025-02-03 PROCEDURE — G8417 CALC BMI ABV UP PARAM F/U: HCPCS | Performed by: NURSE PRACTITIONER

## 2025-02-03 PROCEDURE — 3078F DIAST BP <80 MM HG: CPT | Performed by: NURSE PRACTITIONER

## 2025-02-03 PROCEDURE — 3074F SYST BP LT 130 MM HG: CPT | Performed by: NURSE PRACTITIONER

## 2025-02-03 ASSESSMENT — ENCOUNTER SYMPTOMS
CHEST TIGHTNESS: 0
COUGH: 0
SORE THROAT: 0
WHEEZING: 0
SHORTNESS OF BREATH: 0

## 2025-02-03 NOTE — PROGRESS NOTES
[facility] Cardiology   Established Patient Office Visit  [address]  [address]  [place] [id number] [phone]        OFFICE VISIT:  2/3/2025    Sara Pardo - : 1960    Reason For Visit:  Sara is a 64 y.o. female who is here for Follow-up    1. Coronary artery disease involving native coronary artery of native heart without angina pectoris    2. Essential hypertension    3. Dyslipidemia        Patient with a history of CAD status post CABG robotic x 2 on 2024.     Patient had had an abnormal stress test followed by left heart cath that showed LAD and diagonal disease.  It was not amenable to PCI.  She was referred to cardiac surgery.     Patient with a history of severe obesity, GERI on CPAP, diabetes, end-stage renal disease on hemodialysis.  Family history of premature CAD, prior history of DVT with DVT filter, hypertension and dyslipidemia.    Patient presents to clinic today accompanied by .  She states she has had significantly low blood pressures at home.  98/48.  She notices when she stands up she becomes lightheaded and dizzy and has fallen.  Kidney doctor recommended that she be on losartan.  She is on losartan 50 mg daily.  Coreg 3.125 mg twice a day.  She did stop both medications over the weekend and her blood pressure this morning has been 128/72.  Patient wanting some direction on what to do regarding her blood pressure and her medications.    Subjective    Sara Pardo is a 64 y.o. female with the following history as recorded in [facility]:    Patient Active Problem List    Diagnosis Date Noted    Anemia in end-stage renal disease (HCC) 2023    Great toe pain, left 2023    Colitis 10/22/2022    Pyelonephritis 2022    CAD in native artery 2024    Coronary artery disease 2024    Positive cardiac stress test 10/30/2024    Leg cramps 10/14/2024    Dysuria 10/14/2024    Mitral stenosis 10/08/2024    Nonrheumatic aortic valve insufficiency

## 2025-02-06 ENCOUNTER — HOSPITAL ENCOUNTER (OUTPATIENT)
Dept: CARDIAC REHAB | Age: 65
Setting detail: THERAPIES SERIES
Discharge: HOME | End: 2025-02-06

## 2025-02-24 ENCOUNTER — OFFICE VISIT (OUTPATIENT)
Dept: CARDIOLOGY CLINIC | Age: 65
End: 2025-02-24
Payer: MEDICARE

## 2025-02-24 VITALS
BODY MASS INDEX: 44.32 KG/M2 | WEIGHT: 266 LBS | OXYGEN SATURATION: 100 % | DIASTOLIC BLOOD PRESSURE: 80 MMHG | SYSTOLIC BLOOD PRESSURE: 130 MMHG | HEIGHT: 65 IN | HEART RATE: 84 BPM

## 2025-02-24 DIAGNOSIS — I25.10 CORONARY ARTERY DISEASE INVOLVING NATIVE CORONARY ARTERY OF NATIVE HEART WITHOUT ANGINA PECTORIS: Primary | ICD-10-CM

## 2025-02-24 DIAGNOSIS — E78.5 DYSLIPIDEMIA: ICD-10-CM

## 2025-02-24 DIAGNOSIS — I10 ESSENTIAL HYPERTENSION: ICD-10-CM

## 2025-02-24 PROCEDURE — G8427 DOCREV CUR MEDS BY ELIG CLIN: HCPCS | Performed by: NURSE PRACTITIONER

## 2025-02-24 PROCEDURE — 3075F SYST BP GE 130 - 139MM HG: CPT | Performed by: NURSE PRACTITIONER

## 2025-02-24 PROCEDURE — G8417 CALC BMI ABV UP PARAM F/U: HCPCS | Performed by: NURSE PRACTITIONER

## 2025-02-24 PROCEDURE — 99214 OFFICE O/P EST MOD 30 MIN: CPT | Performed by: NURSE PRACTITIONER

## 2025-02-24 PROCEDURE — 3079F DIAST BP 80-89 MM HG: CPT | Performed by: NURSE PRACTITIONER

## 2025-02-24 PROCEDURE — 1036F TOBACCO NON-USER: CPT | Performed by: NURSE PRACTITIONER

## 2025-02-24 PROCEDURE — 3017F COLORECTAL CA SCREEN DOC REV: CPT | Performed by: NURSE PRACTITIONER

## 2025-02-24 ASSESSMENT — ENCOUNTER SYMPTOMS
SHORTNESS OF BREATH: 0
WHEEZING: 0
CHEST TIGHTNESS: 0
COUGH: 0
SORE THROAT: 0

## 2025-02-24 NOTE — PROGRESS NOTES
[facility]   Established Patient Office Visit  [address]  [address]  [facility] [address]  [phone]        OFFICE VISIT:  2025    Sara Pardo - : 1960    Reason For Visit:  Sara is a 64 y.o. female who is here for Follow-up and Coronary Artery Disease    1. Coronary artery disease involving native coronary artery of native heart without angina pectoris    2. Essential hypertension    3. Dyslipidemia        Patient with a history of CAD status post CABG robotic x 2 on 2024.     Patient had had an abnormal stress test followed by left heart cath that showed LAD and diagonal disease.  It was not amenable to PCI.  She was referred to cardiac surgery.     Patient with a history of severe obesity, GERI on CPAP, diabetes, end-stage renal disease on hemodialysis.  Family history of premature CAD, prior history of DVT with DVT filter, hypertension and dyslipidemia.     Patient presented to clinic 2/3/2025  accompanied by .  She statesd she had significantly low blood pressures at home.  98/48.  She noticed when she stands up she becomes lightheaded and dizzy and has fallen.  Kidney doctor recommended that she be on losartan.  She is on losartan 50 mg daily.  Coreg 3.125 mg twice a day.  She did stop both medications over the weekend and her blood pressure this morning has been 128/72.  Patient wanting some direction on what to do regarding her blood pressure and her medications.  We did stop the Coreg 3.125 twice a day as she had a normal EF.  We did have patient restart the losartan but at a lower dose of 25 mg daily.    Patient presents to clinic today for medication response.  Patient's home blood pressure log showing blood pressures 109 -128 systolic.  Diastolics 67-71.  Much improved.  No complaints today.      Subjective    Sara Pardo is a 64 y.o. female with the following history as recorded in [facility]:    Patient Active Problem List    Diagnosis Date Noted    Anemia in

## 2025-03-06 ENCOUNTER — LAB (OUTPATIENT)
Dept: LAB | Facility: HOSPITAL | Age: 65
End: 2025-03-06
Payer: MEDICARE

## 2025-03-06 DIAGNOSIS — E11.649 TYPE 2 DIABETES MELLITUS WITH HYPOGLYCEMIA WITHOUT COMA, WITH LONG-TERM CURRENT USE OF INSULIN: ICD-10-CM

## 2025-03-06 DIAGNOSIS — Z79.4 TYPE 2 DIABETES MELLITUS WITH HYPOGLYCEMIA WITHOUT COMA, WITH LONG-TERM CURRENT USE OF INSULIN: ICD-10-CM

## 2025-03-06 LAB
25(OH)D3 SERPL-MCNC: 45.2 NG/ML (ref 30–100)
ALBUMIN SERPL-MCNC: 4 G/DL (ref 3.5–5)
ALBUMIN UR-MCNC: 20.6 MG/DL
ALBUMIN/GLOB SERPL: 1.3 G/DL (ref 1.1–2.5)
ALP SERPL-CCNC: 92 U/L (ref 24–120)
ALT SERPL W P-5'-P-CCNC: 12 U/L (ref 0–35)
ANION GAP SERPL CALCULATED.3IONS-SCNC: 11 MMOL/L (ref 4–13)
AST SERPL-CCNC: 13 U/L (ref 7–45)
AUTO MIXED CELLS #: 0.6 10*3/MM3 (ref 0.1–2.6)
AUTO MIXED CELLS %: 5.7 % (ref 0.1–24)
BILIRUB SERPL-MCNC: 0.4 MG/DL (ref 0.1–1)
BUN SERPL-MCNC: 55 MG/DL (ref 5–21)
BUN/CREAT SERPL: 6
CALCIUM SPEC-SCNC: 10.5 MG/DL (ref 8.6–10.5)
CHLORIDE SERPL-SCNC: 92 MMOL/L (ref 98–110)
CO2 SERPL-SCNC: 30 MMOL/L (ref 24–31)
CREAT SERPL-MCNC: 9.2 MG/DL (ref 0.5–1.4)
EGFRCR SERPLBLD CKD-EPI 2021: 4.4 ML/MIN/1.73
ERYTHROCYTE [DISTWIDTH] IN BLOOD BY AUTOMATED COUNT: 18 % (ref 12.3–15.4)
GLOBULIN UR ELPH-MCNC: 3.2 GM/DL
GLUCOSE SERPL-MCNC: 206 MG/DL (ref 65–99)
HBA1C MFR BLD: 7.3 % (ref 4.8–5.9)
HCT VFR BLD AUTO: 27.1 % (ref 34–46.6)
HGB BLD-MCNC: 8.4 G/DL (ref 12–15.9)
LYMPHOCYTES # BLD AUTO: 1.9 10*3/MM3 (ref 0.7–3.1)
LYMPHOCYTES NFR BLD AUTO: 18.9 % (ref 19.6–45.3)
MCH RBC QN AUTO: 30.7 PG (ref 26.6–33)
MCHC RBC AUTO-ENTMCNC: 31 G/DL (ref 31.5–35.7)
MCV RBC AUTO: 98.9 FL (ref 79–97)
NEUTROPHILS NFR BLD AUTO: 7.4 10*3/MM3 (ref 1.7–7)
NEUTROPHILS NFR BLD AUTO: 75.4 % (ref 42.7–76)
PLATELET # BLD AUTO: 262 10*3/MM3 (ref 140–450)
PMV BLD AUTO: 8.3 FL (ref 6–12)
POTASSIUM SERPL-SCNC: 4.4 MMOL/L (ref 3.5–5.3)
PROT SERPL-MCNC: 7.2 G/DL (ref 6.3–8.7)
RBC # BLD AUTO: 2.74 10*6/MM3 (ref 3.77–5.28)
SODIUM SERPL-SCNC: 133 MMOL/L (ref 135–145)
TSH SERPL DL<=0.05 MIU/L-ACNC: 2.23 UIU/ML (ref 0.27–4.2)
VIT B12 BLD-MCNC: 552 PG/ML (ref 211–946)
WBC NRBC COR # BLD AUTO: 9.9 10*3/MM3 (ref 3.4–10.8)

## 2025-03-06 PROCEDURE — 83036 HEMOGLOBIN GLYCOSYLATED A1C: CPT

## 2025-03-06 PROCEDURE — 82043 UR ALBUMIN QUANTITATIVE: CPT

## 2025-03-06 PROCEDURE — 85025 COMPLETE CBC W/AUTO DIFF WBC: CPT

## 2025-03-06 PROCEDURE — 80053 COMPREHEN METABOLIC PANEL: CPT

## 2025-03-06 PROCEDURE — 84443 ASSAY THYROID STIM HORMONE: CPT

## 2025-03-06 PROCEDURE — 82607 VITAMIN B-12: CPT

## 2025-03-06 PROCEDURE — 36415 COLL VENOUS BLD VENIPUNCTURE: CPT

## 2025-03-06 PROCEDURE — 82306 VITAMIN D 25 HYDROXY: CPT

## 2025-03-16 ENCOUNTER — HOSPITAL ENCOUNTER (EMERGENCY)
Age: 65
Discharge: HOME | End: 2025-03-16
Attending: EMERGENCY MEDICINE
Payer: MEDICARE

## 2025-03-16 VITALS
BODY MASS INDEX: 42.56 KG/M2 | HEART RATE: 71 BPM | SYSTOLIC BLOOD PRESSURE: 136 MMHG | OXYGEN SATURATION: 97 % | TEMPERATURE: 97.4 F | RESPIRATION RATE: 16 BRPM | WEIGHT: 255.73 LBS | DIASTOLIC BLOOD PRESSURE: 51 MMHG

## 2025-03-16 DIAGNOSIS — N18.6 ESRD ON DIALYSIS (HCC): ICD-10-CM

## 2025-03-16 DIAGNOSIS — R55 NEAR SYNCOPE: Primary | ICD-10-CM

## 2025-03-16 DIAGNOSIS — Z99.2 ESRD ON DIALYSIS (HCC): ICD-10-CM

## 2025-03-16 LAB
ALBUMIN SERPL-MCNC: 4.1 G/DL (ref 3.5–5.2)
ALP SERPL-CCNC: 75 U/L (ref 35–104)
ALT SERPL-CCNC: <5 U/L (ref 10–35)
ANION GAP SERPL CALCULATED.3IONS-SCNC: 18 MMOL/L (ref 8–16)
AST SERPL-CCNC: 14 U/L (ref 10–35)
BASOPHILS # BLD: 0.1 K/UL (ref 0–0.2)
BASOPHILS NFR BLD: 0.7 % (ref 0–1)
BILIRUB SERPL-MCNC: 0.4 MG/DL (ref 0.2–1.2)
BUN SERPL-MCNC: 19 MG/DL (ref 8–23)
CALCIUM SERPL-MCNC: 10.6 MG/DL (ref 8.8–10.2)
CHLORIDE SERPL-SCNC: 87 MMOL/L (ref 98–107)
CO2 SERPL-SCNC: 27 MMOL/L (ref 22–29)
CREAT SERPL-MCNC: 6 MG/DL (ref 0.5–0.9)
EOSINOPHIL # BLD: 0.4 K/UL (ref 0–0.6)
EOSINOPHIL NFR BLD: 4.1 % (ref 0–5)
ERYTHROCYTE [DISTWIDTH] IN BLOOD BY AUTOMATED COUNT: 16.1 % (ref 11.5–14.5)
GLUCOSE SERPL-MCNC: 197 MG/DL (ref 70–99)
HCT VFR BLD AUTO: 30.7 % (ref 37–47)
HGB BLD-MCNC: 9.9 G/DL (ref 12–16)
IMM GRANULOCYTES # BLD: 0.1 K/UL
LYMPHOCYTES # BLD: 1.9 K/UL (ref 1.1–4.5)
LYMPHOCYTES NFR BLD: 19.1 % (ref 20–40)
MCH RBC QN AUTO: 32.5 PG (ref 27–31)
MCHC RBC AUTO-ENTMCNC: 32.2 G/DL (ref 33–37)
MCV RBC AUTO: 100.7 FL (ref 81–99)
MONOCYTES # BLD: 0.5 K/UL (ref 0–0.9)
MONOCYTES NFR BLD: 5.1 % (ref 0–10)
NEUTROPHILS # BLD: 7.1 K/UL (ref 1.5–7.5)
NEUTS SEG NFR BLD: 70.3 % (ref 50–65)
PLATELET # BLD AUTO: 267 K/UL (ref 130–400)
PMV BLD AUTO: 9.6 FL (ref 9.4–12.3)
POTASSIUM SERPL-SCNC: 3.4 MMOL/L (ref 3.5–5.1)
PROT SERPL-MCNC: 7.4 G/DL (ref 6.4–8.3)
RBC # BLD AUTO: 3.05 M/UL (ref 4.2–5.4)
SODIUM SERPL-SCNC: 132 MMOL/L (ref 136–145)
WBC # BLD AUTO: 10.1 K/UL (ref 4.8–10.8)

## 2025-03-16 PROCEDURE — 80053 COMPREHEN METABOLIC PANEL: CPT

## 2025-03-16 PROCEDURE — 93005 ELECTROCARDIOGRAM TRACING: CPT | Performed by: EMERGENCY MEDICINE

## 2025-03-16 PROCEDURE — 85025 COMPLETE CBC W/AUTO DIFF WBC: CPT

## 2025-03-16 PROCEDURE — 2580000003 HC RX 258: Performed by: EMERGENCY MEDICINE

## 2025-03-16 PROCEDURE — 99284 EMERGENCY DEPT VISIT MOD MDM: CPT

## 2025-03-16 PROCEDURE — 36415 COLL VENOUS BLD VENIPUNCTURE: CPT

## 2025-03-16 PROCEDURE — 96360 HYDRATION IV INFUSION INIT: CPT

## 2025-03-16 RX ORDER — 0.9 % SODIUM CHLORIDE 0.9 %
500 INTRAVENOUS SOLUTION INTRAVENOUS ONCE
Status: COMPLETED | OUTPATIENT
Start: 2025-03-16 | End: 2025-03-16

## 2025-03-16 RX ADMIN — SODIUM CHLORIDE 500 ML: 0.9 INJECTION, SOLUTION INTRAVENOUS at 19:50

## 2025-03-16 NOTE — ED PROVIDER NOTES
[facility] EMERGENCY DEPARTMENT  eMERGENCY dEPARTMENT eNCOUnter      Pt Name: Sara Pardo  MRN: [id number]  Birthdate 1960  Date of evaluation: 3/16/2025  Provider: CHRISTAL PICKARD MD    CHIEF COMPLAINT       Chief Complaint   Patient presents with    Loss of Consciousness     She thinks she had a LOC during the last few min of dialysis today          HISTORY OF PRESENT ILLNESS   (Location/Symptom, Timing/Onset,Context/Setting, Quality, Duration, Modifying Factors, Severity)  Note limiting factors.   Sara Pardo is a 64 y.o. female who presents to the emergency department for concern of possible syncopal event.  Patient does home hemodialysis and had finished her 3-hour treatment today and her  was trying to talk to her and she was somewhat difficult to arouse.  Felt like she was somewhat in and out of it around 17 15-17 20 and then tried to stand around 1730 and immediately had to sit back down.  She denies any chest pain or shortness of breath.  No headache or focal neurologic complaints.  She does hemodialysis 5 days a week.  She thinks possibly she just pulled off a little too much fluid potentially.    HPI    NursingNotes were reviewed.    REVIEW OF SYSTEMS    (2-9 systems for level 4, 10 or more for level 5)     Review of Systems   Constitutional:  Negative for chills and fever.   HENT:  Negative for rhinorrhea and sore throat.    Respiratory:  Negative for cough and shortness of breath.    Cardiovascular:  Negative for chest pain.   Gastrointestinal:  Negative for abdominal pain, diarrhea, nausea and vomiting.   Musculoskeletal:  Negative for back pain and neck pain.   Neurological:  Positive for light-headedness. Negative for dizziness, seizures, facial asymmetry, speech difficulty, weakness and headaches.         PAST MEDICALHISTORY     Past Medical History:   Diagnosis Date    Acute hemodialysis encounter 08/10/2021    Acute hypoxemic respiratory failure (HCC) 08/10/2021    Anemia in end-stage

## 2025-03-19 LAB
EKG P AXIS: 13 DEGREES
EKG P AXIS: NORMAL DEGREES
EKG P-R INTERVAL: 222 MS
EKG P-R INTERVAL: NORMAL MS
EKG Q-T INTERVAL: 422 MS
EKG Q-T INTERVAL: 458 MS
EKG QRS DURATION: 92 MS
EKG QRS DURATION: 96 MS
EKG QTC CALCULATION (BAZETT): 462 MS
EKG QTC CALCULATION (BAZETT): 477 MS
EKG T AXIS: 38 DEGREES
EKG T AXIS: 9 DEGREES

## 2025-03-19 PROCEDURE — 93010 ELECTROCARDIOGRAM REPORT: CPT | Performed by: INTERNAL MEDICINE

## 2025-03-21 DIAGNOSIS — G47.09 OTHER INSOMNIA: ICD-10-CM

## 2025-03-21 RX ORDER — TRAZODONE HYDROCHLORIDE 100 MG/1
100 TABLET ORAL NIGHTLY
Qty: 90 TABLET | Refills: 0 | Status: SHIPPED | OUTPATIENT
Start: 2025-03-21 | End: 2025-09-17

## 2025-03-21 NOTE — TELEPHONE ENCOUNTER
Sara M Edvin called to request a refill on her medication.      Last office visit : 10/14/2024   Next office visit : 4/22/2025     Requested Prescriptions     Signed Prescriptions Disp Refills    traZODone (DESYREL) 100 MG tablet 90 tablet 0     Sig: Take 1 tablet by mouth nightly     Authorizing Provider: BENJAMIN LOPEZ     Ordering User: BIGG GARRETT LPN

## 2025-03-31 ENCOUNTER — OFFICE VISIT (OUTPATIENT)
Age: 65
End: 2025-03-31
Payer: MEDICARE

## 2025-03-31 DIAGNOSIS — G89.29 CHRONIC PAIN OF LEFT KNEE: ICD-10-CM

## 2025-03-31 DIAGNOSIS — M25.562 CHRONIC PAIN OF LEFT KNEE: ICD-10-CM

## 2025-03-31 DIAGNOSIS — M17.12 PRIMARY OSTEOARTHRITIS OF LEFT KNEE: Primary | ICD-10-CM

## 2025-03-31 PROCEDURE — 3017F COLORECTAL CA SCREEN DOC REV: CPT | Performed by: PHYSICIAN ASSISTANT

## 2025-03-31 PROCEDURE — G8417 CALC BMI ABV UP PARAM F/U: HCPCS | Performed by: PHYSICIAN ASSISTANT

## 2025-03-31 PROCEDURE — 99213 OFFICE O/P EST LOW 20 MIN: CPT | Performed by: PHYSICIAN ASSISTANT

## 2025-03-31 PROCEDURE — 1036F TOBACCO NON-USER: CPT | Performed by: PHYSICIAN ASSISTANT

## 2025-03-31 PROCEDURE — G8427 DOCREV CUR MEDS BY ELIG CLIN: HCPCS | Performed by: PHYSICIAN ASSISTANT

## 2025-03-31 NOTE — PROGRESS NOTES
Orthopaedic Clinic Note - Established Patient    NAME:  Sara Pardo   : 1960  MRN: [id number]      3/31/2025      CHIEF COMPLAINT: Follow-up left knee pain      HISTORY OF PRESENT ILLNESS:   This is a pleasant 64-year-old morbidly obese female who comes in with left knee pain. This is a chronic issue. Has history of osteoarthritis. Patient is status post recent coronary artery bypass grafting. She still overcoming that surgery. Patient's had injections in her knees in the past. Injections give her mild to moderate relief. Patient is here to discuss further treatment options     The above note copied from date of service 2024:    Patient comes in for follow-up of left knee pain.  Patient reports she is very weak.  She had recent coronary bypass grafting.  Patient is unable to do therapy secondary to insurance would not cover it.  She is very weak and tired.  She also has a history of CHF and chronic kidney disease stage IV.  She reports her left knee is not really bothering her.  Minimal pain.  She denies any swelling.    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 Pt not sure of date, states     CHF (congestive heart failure) (HCC)     CKD (chronic kidney disease)     stage 4    Colitis     Colon polyp     Deep vein thrombosis (HCC)     Depression     Embolism - blood clot     Hemodialysis patient     5 days a week at home, takes  + one other day off each week    Hx of blood clots     Hyperlipidemia     Hypertension     Hypertension associated with diabetes (HCC) 10/17/2016    Obesity, morbid, BMI 50 or higher     Sleep apnea     CPAP    Thyroid disease     Type II or unspecified type diabetes mellitus without mention of

## 2025-04-07 ENCOUNTER — OFFICE VISIT (OUTPATIENT)
Dept: CARDIOLOGY CLINIC | Age: 65
End: 2025-04-07
Payer: MEDICARE

## 2025-04-07 VITALS
WEIGHT: 252 LBS | OXYGEN SATURATION: 99 % | SYSTOLIC BLOOD PRESSURE: 124 MMHG | HEIGHT: 66 IN | DIASTOLIC BLOOD PRESSURE: 76 MMHG | BODY MASS INDEX: 40.5 KG/M2 | HEART RATE: 86 BPM

## 2025-04-07 DIAGNOSIS — E78.5 DYSLIPIDEMIA: ICD-10-CM

## 2025-04-07 DIAGNOSIS — I25.10 CORONARY ARTERY DISEASE INVOLVING NATIVE CORONARY ARTERY OF NATIVE HEART WITHOUT ANGINA PECTORIS: Primary | ICD-10-CM

## 2025-04-07 DIAGNOSIS — I10 ESSENTIAL HYPERTENSION: ICD-10-CM

## 2025-04-07 PROCEDURE — 99214 OFFICE O/P EST MOD 30 MIN: CPT | Performed by: NURSE PRACTITIONER

## 2025-04-07 PROCEDURE — 3074F SYST BP LT 130 MM HG: CPT | Performed by: NURSE PRACTITIONER

## 2025-04-07 PROCEDURE — G8417 CALC BMI ABV UP PARAM F/U: HCPCS | Performed by: NURSE PRACTITIONER

## 2025-04-07 PROCEDURE — 1123F ACP DISCUSS/DSCN MKR DOCD: CPT | Performed by: NURSE PRACTITIONER

## 2025-04-07 PROCEDURE — G8427 DOCREV CUR MEDS BY ELIG CLIN: HCPCS | Performed by: NURSE PRACTITIONER

## 2025-04-07 PROCEDURE — 1036F TOBACCO NON-USER: CPT | Performed by: NURSE PRACTITIONER

## 2025-04-07 PROCEDURE — 3017F COLORECTAL CA SCREEN DOC REV: CPT | Performed by: NURSE PRACTITIONER

## 2025-04-07 PROCEDURE — G8399 PT W/DXA RESULTS DOCUMENT: HCPCS | Performed by: NURSE PRACTITIONER

## 2025-04-07 PROCEDURE — 3078F DIAST BP <80 MM HG: CPT | Performed by: NURSE PRACTITIONER

## 2025-04-07 PROCEDURE — 1090F PRES/ABSN URINE INCON ASSESS: CPT | Performed by: NURSE PRACTITIONER

## 2025-04-07 RX ORDER — B,C/FERROUS FUM/FA/D3/ZINC OX 8MG-800MCG
1 TABLET ORAL DAILY
COMMUNITY
Start: 2025-01-19

## 2025-04-07 ASSESSMENT — ENCOUNTER SYMPTOMS
SHORTNESS OF BREATH: 0
CHEST TIGHTNESS: 0
SORE THROAT: 0
WHEEZING: 0
COUGH: 0

## 2025-04-07 NOTE — PROGRESS NOTES
[facility]   Established Patient Office Visit  [address]  [address]  [facility] [address]  [phone]        OFFICE VISIT:  2025    Sara Pardo - : 1960    Reason For Visit:  Sara is a 65 y.o. female who is here for Follow-up and Coronary Artery Disease    1. Coronary artery disease involving native coronary artery of native heart without angina pectoris    2. Essential hypertension    3. Dyslipidemia        Patient with a history of CAD status post CABG robotic x 2 on 2024.     Patient had had an abnormal stress test followed by left heart cath that showed LAD and diagonal disease.  It was not amenable to PCI.  She was referred to cardiac surgery.     Patient with a history of severe obesity, GERI on CPAP, diabetes, end-stage renal disease on hemodialysis.  Family history of premature CAD, prior history of DVT with DVT filter, hypertension and dyslipidemia.    Patient presented to clinic 2/3/2025  accompanied by .  She statesd she had significantly low blood pressures at home.  98/48.  She noticed when she stands up she becomes lightheaded and dizzy and has fallen.  Kidney doctor recommended that she be on losartan.  She is on losartan 50 mg daily.  Coreg 3.125 mg twice a day.  She did stop both medications over the weekend and her blood pressure this morning has been 128/72.  Patient wanting some direction on what to do regarding her blood pressure and her medications.  We did stop the Coreg 3.125 twice a day as she had a normal EF.  We did have patient restart the losartan but at a lower dose of 25 mg daily.    Patient presents to clinic today for routine follow-up.  Patient denies any chest pain, pressure or tightness.  She still has some little incisional discomfort.  But that is improving.  There is no shortness of breath, orthopnea or PND.  Patient denies any lightheadedness, dizziness or syncope.      Subjective    Sara Pardo is a 65 y.o. female with the following

## 2025-04-08 ENCOUNTER — OFFICE VISIT (OUTPATIENT)
Dept: PULMONOLOGY | Age: 65
End: 2025-04-08
Payer: MEDICARE

## 2025-04-08 VITALS
HEIGHT: 66 IN | BODY MASS INDEX: 40.02 KG/M2 | RESPIRATION RATE: 20 BRPM | TEMPERATURE: 96.9 F | OXYGEN SATURATION: 98 % | SYSTOLIC BLOOD PRESSURE: 118 MMHG | HEART RATE: 88 BPM | WEIGHT: 249 LBS | DIASTOLIC BLOOD PRESSURE: 78 MMHG

## 2025-04-08 DIAGNOSIS — E66.01 MORBID OBESITY: ICD-10-CM

## 2025-04-08 DIAGNOSIS — I34.2 NONRHEUMATIC MITRAL VALVE STENOSIS: ICD-10-CM

## 2025-04-08 DIAGNOSIS — G47.33 OSA ON CPAP: Primary | ICD-10-CM

## 2025-04-08 DIAGNOSIS — Z95.1 STATUS POST AORTO-CORONARY ARTERY BYPASS GRAFT: ICD-10-CM

## 2025-04-08 DIAGNOSIS — R06.02 SHORTNESS OF BREATH: ICD-10-CM

## 2025-04-08 PROCEDURE — 1123F ACP DISCUSS/DSCN MKR DOCD: CPT | Performed by: INTERNAL MEDICINE

## 2025-04-08 PROCEDURE — 1090F PRES/ABSN URINE INCON ASSESS: CPT | Performed by: INTERNAL MEDICINE

## 2025-04-08 PROCEDURE — G8399 PT W/DXA RESULTS DOCUMENT: HCPCS | Performed by: INTERNAL MEDICINE

## 2025-04-08 PROCEDURE — 3078F DIAST BP <80 MM HG: CPT | Performed by: INTERNAL MEDICINE

## 2025-04-08 PROCEDURE — 99214 OFFICE O/P EST MOD 30 MIN: CPT | Performed by: INTERNAL MEDICINE

## 2025-04-08 PROCEDURE — 3074F SYST BP LT 130 MM HG: CPT | Performed by: INTERNAL MEDICINE

## 2025-04-08 PROCEDURE — 1036F TOBACCO NON-USER: CPT | Performed by: INTERNAL MEDICINE

## 2025-04-08 PROCEDURE — 3017F COLORECTAL CA SCREEN DOC REV: CPT | Performed by: INTERNAL MEDICINE

## 2025-04-08 PROCEDURE — G8427 DOCREV CUR MEDS BY ELIG CLIN: HCPCS | Performed by: INTERNAL MEDICINE

## 2025-04-08 PROCEDURE — G8417 CALC BMI ABV UP PARAM F/U: HCPCS | Performed by: INTERNAL MEDICINE

## 2025-04-08 RX ORDER — INSULIN LISPRO 100 [IU]/ML
INJECTION, SOLUTION INTRAVENOUS; SUBCUTANEOUS
COMMUNITY
Start: 2025-03-22

## 2025-04-08 ASSESSMENT — ENCOUNTER SYMPTOMS
BACK PAIN: 0
WHEEZING: 0
COUGH: 0
CHEST TIGHTNESS: 0
ABDOMINAL PAIN: 0
RHINORRHEA: 0
APNEA: 0
ABDOMINAL DISTENTION: 0
ANAL BLEEDING: 0
SHORTNESS OF BREATH: 1

## 2025-04-08 NOTE — PROGRESS NOTES
Pulmonary and Sleep Medicine    Sara Pardo (:  1960) is a 65 y.o. female,Established patient, here for evaluation of the following chief complaint(s):  Follow-up (6 month follow up- GERI/Compliance report uploaded on 2025.)      Referring physician:  No referring provider defined for this encounter.     ASSESSMENT/PLAN:  1. GERI on CPAP  2. Nonrheumatic mitral valve stenosis  3. Status post aorto-coronary artery bypass graft. 2024 Dr. Morataya.  4. Morbid obesity.  Chronic stable.  5. Shortness of breath        Continue current management with the CPAP she is compliant with the CPAP she feels that the CPAP helps.       Carlota Guillory MD, [facility], [facility]    Return in about 6 months (around 10/8/2025).    SUBJECTIVE/OBJECTIVE:        Patient is here for follow-up on obstructive sleep apnea and shortness of breath.  Since her last visit she was evaluated by cardiology.  She underwent left heart cath.  She required stents and CABG.  She is doing well postoperatively.  She is using her CPAP on average about 8 hours a night.  According to my interpretation of the CPAP download her average apnea-hypopnea index is about 2.5 events per hour.  She feels that the CPAP is helping significantly.          Continue the following medications as reported by the patient:    Prior to Visit Medications    Medication Sig Taking? Authorizing Provider   HUMALOG 100 UNIT/ML SOLN injection vial 200 units daily through pump Yes Aspen Rosa MD   Multiple Vitamins-Minerals (PRORENAL + D W/ OMEGA-3) CAPS Take 1 tablet by mouth daily Yes Aspen Rosa MD   traZODone (DESYREL) 100 MG tablet Take 1 tablet by mouth nightly  Patient taking differently: Take 1.5 tablets by mouth nightly Yes Deepali Gilliland MD   Magnesium Hydroxide (DULCOLAX PO) Take 3 tablets by mouth daily 3 tablets per meal Yes Aspen Rosa MD   famotidine (PEPCID) 20 MG tablet TAKE (1/2) TABLET BY MOUTH EVERY EVENING FOR GERD

## 2025-04-21 NOTE — PROGRESS NOTES
Sara Pardo ( 1960) is a 65 y.o. female, Established , here for evaluation of the following chief complaint(s).  Medicare AWV and 6 Month Follow-Up      Patient was encouraged and advised to be compliant with all  medications leads an active lifestyle and promote maintaining a healthy weight, encouraged not to use cigarettes, laboratory results discussed and reviewed with patient's during this visit       Assessment & Plan  CAD in native artery   Monitored by specialist- no acute findings meriting change in the plan    Orders:    [facility] MOD MDM, 30-39 MIN [[id number]]    Anxiety   Monitored by specialist- no acute findings meriting change in the plan was to follow-up with cardiology will refer back    Orders:    [facility], MOD MDM, 30-39 MIN [[id number]]    Moderate major depression (HCC)   Monitored by specialist- no acute findings meriting change in the plan, patient lost to follow-up with psychiatry will refer back    Orders:    [facility] Giron, MOD MDM, 30-39 MIN [[id number]]    Encounter for screening mammogram for breast cancer    Screening mammogram ordered    Orders:    [facility] DIGITAL SCREEN W OR WO CAD BILATERAL; Future    Primary hypertension   Chronic, at goal (stable), continue current treatment plan    Orders:    [facility] MOD MDM, 30-39 MIN [[id number]]    GERI on CPAP   Chronic, at goal (stable), continue current treatment plan uses CPAP nightly    Orders:    [facility] MOD MDM, 30-39 MIN [[id number]]    Acquired hypothyroidism   Monitored by specialist- no acute findings meriting change in the plan TSH is normal    Orders:    [facility] MOD MDM, 30-39 MIN [[id number]]    Type 2 diabetes mellitus with end-stage renal disease (HCC)   Chronic, at goal (stable), continue current treatment plan patient currently is on insulin and they are switching her to a pump and she is also on an SGLT2 which has helped

## 2025-04-22 ENCOUNTER — OFFICE VISIT (OUTPATIENT)
Dept: PRIMARY CARE CLINIC | Age: 65
End: 2025-04-22
Payer: MEDICARE

## 2025-04-22 VITALS
TEMPERATURE: 97.7 F | BODY MASS INDEX: 39.53 KG/M2 | RESPIRATION RATE: 18 BRPM | OXYGEN SATURATION: 98 % | DIASTOLIC BLOOD PRESSURE: 72 MMHG | HEART RATE: 74 BPM | WEIGHT: 246 LBS | HEIGHT: 66 IN | SYSTOLIC BLOOD PRESSURE: 126 MMHG

## 2025-04-22 DIAGNOSIS — N18.6 ESRD ON PERITONEAL DIALYSIS (HCC): ICD-10-CM

## 2025-04-22 DIAGNOSIS — N18.6 TYPE 2 DIABETES MELLITUS WITH END-STAGE RENAL DISEASE (HCC): ICD-10-CM

## 2025-04-22 DIAGNOSIS — Z99.2 ESRD ON PERITONEAL DIALYSIS (HCC): ICD-10-CM

## 2025-04-22 DIAGNOSIS — F41.9 ANXIETY: ICD-10-CM

## 2025-04-22 DIAGNOSIS — G47.33 OSA ON CPAP: ICD-10-CM

## 2025-04-22 DIAGNOSIS — E03.9 ACQUIRED HYPOTHYROIDISM: ICD-10-CM

## 2025-04-22 DIAGNOSIS — I10 PRIMARY HYPERTENSION: ICD-10-CM

## 2025-04-22 DIAGNOSIS — Z12.31 ENCOUNTER FOR SCREENING MAMMOGRAM FOR BREAST CANCER: ICD-10-CM

## 2025-04-22 DIAGNOSIS — F32.1 MODERATE MAJOR DEPRESSION (HCC): ICD-10-CM

## 2025-04-22 DIAGNOSIS — E11.22 TYPE 2 DIABETES MELLITUS WITH END-STAGE RENAL DISEASE (HCC): ICD-10-CM

## 2025-04-22 DIAGNOSIS — Z00.00 MEDICARE ANNUAL WELLNESS VISIT, SUBSEQUENT: Primary | ICD-10-CM

## 2025-04-22 DIAGNOSIS — I25.10 CAD IN NATIVE ARTERY: ICD-10-CM

## 2025-04-22 DIAGNOSIS — E66.9 EXTREME OBESITY: ICD-10-CM

## 2025-04-22 PROBLEM — G47.8 NON-RESTORATIVE SLEEP: Status: RESOLVED | Noted: 2023-03-30 | Resolved: 2025-04-22

## 2025-04-22 PROCEDURE — 2022F DILAT RTA XM EVC RTNOPTHY: CPT | Performed by: INTERNAL MEDICINE

## 2025-04-22 PROCEDURE — 1036F TOBACCO NON-USER: CPT | Performed by: INTERNAL MEDICINE

## 2025-04-22 PROCEDURE — 3046F HEMOGLOBIN A1C LEVEL >9.0%: CPT | Performed by: INTERNAL MEDICINE

## 2025-04-22 PROCEDURE — 3017F COLORECTAL CA SCREEN DOC REV: CPT | Performed by: INTERNAL MEDICINE

## 2025-04-22 PROCEDURE — G8417 CALC BMI ABV UP PARAM F/U: HCPCS | Performed by: INTERNAL MEDICINE

## 2025-04-22 PROCEDURE — G8399 PT W/DXA RESULTS DOCUMENT: HCPCS | Performed by: INTERNAL MEDICINE

## 2025-04-22 PROCEDURE — G0439 PPPS, SUBSEQ VISIT: HCPCS | Performed by: INTERNAL MEDICINE

## 2025-04-22 PROCEDURE — 3074F SYST BP LT 130 MM HG: CPT | Performed by: INTERNAL MEDICINE

## 2025-04-22 PROCEDURE — 1090F PRES/ABSN URINE INCON ASSESS: CPT | Performed by: INTERNAL MEDICINE

## 2025-04-22 PROCEDURE — 1123F ACP DISCUSS/DSCN MKR DOCD: CPT | Performed by: INTERNAL MEDICINE

## 2025-04-22 PROCEDURE — G8427 DOCREV CUR MEDS BY ELIG CLIN: HCPCS | Performed by: INTERNAL MEDICINE

## 2025-04-22 PROCEDURE — 99214 OFFICE O/P EST MOD 30 MIN: CPT | Performed by: INTERNAL MEDICINE

## 2025-04-22 PROCEDURE — 3078F DIAST BP <80 MM HG: CPT | Performed by: INTERNAL MEDICINE

## 2025-04-22 SDOH — ECONOMIC STABILITY: FOOD INSECURITY: WITHIN THE PAST 12 MONTHS, YOU WORRIED THAT YOUR FOOD WOULD RUN OUT BEFORE YOU GOT MONEY TO BUY MORE.: SOMETIMES TRUE

## 2025-04-22 SDOH — ECONOMIC STABILITY: FOOD INSECURITY: WITHIN THE PAST 12 MONTHS, THE FOOD YOU BOUGHT JUST DIDN'T LAST AND YOU DIDN'T HAVE MONEY TO GET MORE.: SOMETIMES TRUE

## 2025-04-22 ASSESSMENT — ENCOUNTER SYMPTOMS
SORE THROAT: 0
CHEST TIGHTNESS: 0
VOMITING: 0
BLOOD IN STOOL: 0
COUGH: 0
SINUS PAIN: 0
ABDOMINAL DISTENTION: 0
ABDOMINAL PAIN: 0
NAUSEA: 0
WHEEZING: 0
RHINORRHEA: 0
APNEA: 0
DIARRHEA: 0
SHORTNESS OF BREATH: 0
CONSTIPATION: 0
BACK PAIN: 0
TROUBLE SWALLOWING: 0

## 2025-04-22 ASSESSMENT — PATIENT HEALTH QUESTIONNAIRE - PHQ9
9. THOUGHTS THAT YOU WOULD BE BETTER OFF DEAD, OR OF HURTING YOURSELF: NOT AT ALL
5. POOR APPETITE OR OVEREATING: MORE THAN HALF THE DAYS
8. MOVING OR SPEAKING SO SLOWLY THAT OTHER PEOPLE COULD HAVE NOTICED. OR THE OPPOSITE, BEING SO FIGETY OR RESTLESS THAT YOU HAVE BEEN MOVING AROUND A LOT MORE THAN USUAL: NOT AT ALL
SUM OF ALL RESPONSES TO PHQ QUESTIONS 1-9: 10
6. FEELING BAD ABOUT YOURSELF - OR THAT YOU ARE A FAILURE OR HAVE LET YOURSELF OR YOUR FAMILY DOWN: SEVERAL DAYS
3. TROUBLE FALLING OR STAYING ASLEEP: SEVERAL DAYS
4. FEELING TIRED OR HAVING LITTLE ENERGY: NEARLY EVERY DAY
SUM OF ALL RESPONSES TO PHQ QUESTIONS 1-9: 10
SUM OF ALL RESPONSES TO PHQ QUESTIONS 1-9: 10
2. FEELING DOWN, DEPRESSED OR HOPELESS: SEVERAL DAYS
SUM OF ALL RESPONSES TO PHQ QUESTIONS 1-9: 10
1. LITTLE INTEREST OR PLEASURE IN DOING THINGS: SEVERAL DAYS
10. IF YOU CHECKED OFF ANY PROBLEMS, HOW DIFFICULT HAVE THESE PROBLEMS MADE IT FOR YOU TO DO YOUR WORK, TAKE CARE OF THINGS AT HOME, OR GET ALONG WITH OTHER PEOPLE: SOMEWHAT DIFFICULT

## 2025-04-22 NOTE — ASSESSMENT & PLAN NOTE
Chronic, at goal (stable), continue current treatment plan    Orders:    ESTABLISHED, MOD MDM, 30-39 MIN [[id number]]

## 2025-04-22 NOTE — ASSESSMENT & PLAN NOTE
Chronic, at goal (stable), continue current treatment plan uses CPAP nightly    Orders:    ESTABLISHED, MOD MDM, 30-39 MIN [[id number]]

## 2025-04-22 NOTE — ASSESSMENT & PLAN NOTE
Monitored by specialist- no acute findings meriting change in the plan, patient lost to follow-up with psychiatry will refer back    Orders:    [facility], Senait LONG, MOD MDM, 30-39 MIN [[id number]]

## 2025-04-22 NOTE — ASSESSMENT & PLAN NOTE
Chronic, at goal (stable), continue current treatment plan patient currently is on insulin and they are switching her to a pump and she is also on an SGLT2 which has helped her lose weight    Orders:    ESTABLISHED, MOD MDM, 30-39 MIN [[id number]]

## 2025-04-22 NOTE — ASSESSMENT & PLAN NOTE
Monitored by specialist- no acute findings meriting change in the plan was to follow-up with cardiology will refer back    Orders:    [facility]    ESTABLISHED, MOD MDM, 30-39 MIN [[id number]]

## 2025-04-22 NOTE — ASSESSMENT & PLAN NOTE
Monitored by specialist- no acute findings meriting change in the plan    Orders:    ESTABLISHED, MOD MDM, 30-39 MIN [[id number]]

## 2025-04-22 NOTE — ASSESSMENT & PLAN NOTE
Monitored by specialist- no acute findings meriting change in the plan    Orders:    ESTABLISHED, MOD MDM, 30-39 MIN [[id number]]

## 2025-04-22 NOTE — ASSESSMENT & PLAN NOTE
Monitored by specialist- no acute findings meriting change in the plan TSH is normal    Orders:    ESTABLISHED, MOD MDM, 30-39 MIN [[id number]]

## 2025-04-22 NOTE — ASSESSMENT & PLAN NOTE
Patient has lost a tremendous amount of weight and has come down as far as her stage and obesity encouraged to do better and to continue her weight loss journey    Orders:    ESTABLISHED, MOD MDM, 30-39 MIN [[id number]]

## 2025-05-30 NOTE — PROGRESS NOTES
[facility] - PODIATRY    Today's Date: 06/05/2025     Patient Name: Maria C Shrestha  MRN: [id number]  CSN: [id number]  PCP: Fatmata Wallace MD  Referring Provider: No ref. provider found    SUBJECTIVE     Chief Complaint   Patient presents with    Follow-up     Fatmata Wallace MD 04/22/25-10 week diabetic foot care-pt states she is here today for diabetic nail/foot care-pt presents in wheel chair-pt denies pain       Diabetes     99 mg/dl BG     HPI: Maria C Shrestha, a 65 y.o.female, comes to clinic as a(n) established patient presenting for diabetic foot exam and complaining of toenail/callus issues. Patient has h/o hypothyroidism, anemia, anxiety, arthritis, cellulitis, CKD, depression, thyroid disease, DM, HLD, HTN, fatigue, gallbladder abscess, obesity.  Patient presents with toenails that are thick, long, and irregular.  Patient reports that in the middle of May her left hallux came off.  She reports she was treating the area for a fungal infection before the nail came off.  Denies any pain or signs of infection.  Patient is IDDM with last stated BG level of 99mg/dl.  Last A1c of 7.3%.  States that glucose is typically well controlled.  Continues to note difficulty caring for nails due to thickness.  Patient reports she has difficulty with ambulation due to knee problems.  She is currently using a wheelchair for ambulation support today.  Reports decreased sensation in her feet. Denies tingling.   Denies pain .  Denies open sores or wounds.  Notes continued dryness to both of her lower extremities.     Relates previous treatment(s) including care by podiatry. Denies any constitutional symptoms. No other pedal complaints at this time.    Past Medical History:   Diagnosis Date    Acquired hypothyroidism 02/06/2017    Allergic rhinitis     Anemia due to stage 4 chronic kidney disease 08/18/2020    Anxiety     Arthritis     Carpal tunnel syndrome 07/2023    Cellulitis     CHF  (congestive heart failure)     Chronic kidney disease     3rd stage    Depression     Dialysis patient     AV fistula in Left arm    Dyslipidemia     Elevated cholesterol     Essential hypertension     Fatigue     Gallbladder abscess     Hearing loss     History of transfusion     Insulin pump in place     Omnipod    Light headed     Limited range of motion (ROM) of shoulder     right    Obesity     Obstructive sleep apnea treated with continuous positive airway pressure (CPAP)     Palpitation     Short of breath on exertion     Sinusitis     Stage 4 chronic kidney disease 09/16/2020    Type 2 diabetes mellitus     Type II diabetes mellitus, uncontrolled     Wears glasses      Past Surgical History:   Procedure Laterality Date    ADENOIDECTOMY      ANAL FISTULA REPAIR Left     x 2     ARTERIOVENOUS FISTULA Left 08/2021    CARPAL TUNNEL RELEASE Left 05/24/2023    Procedure: LEFT OPEN CARPAL TUNNEL RELEASE;  Surgeon: Juno Ledbetter MD;  Location: [facility] OR;  Service: Orthopedics;  Laterality: Left;    CARPAL TUNNEL RELEASE Right 8/2/2023    Procedure: RIGHT OPEN CARPAL TUNNEL RELEASE;  Surgeon: Juno Ledbetter MD;  Location: [facility] OR;  Service: Orthopedics;  Laterality: Right;    CHOLECYSTECTOMY      COLONOSCOPY  01/02/2014    COLONOSCOPY N/A 03/22/2017    Procedure: COLONOSCOPY WITH ANESTHESIA;  Surgeon: Mono Linder MD;  Location: [facility] ENDOSCOPY;  Service:     COLONOSCOPY N/A 05/09/2022    Procedure: COLONOSCOPY WITH ANESTHESIA;  Surgeon: Mono Linder MD;  Location: [facility] ENDOSCOPY;  Service: Gastroenterology;  Laterality: N/A;  pre polyp;brbpr  post  Dr. Soliman    D & C HYSTEROSCOPY N/A 07/25/2018    Procedure: DILATATION AND CURETTAGE HYSTEROSCOPY;  Surgeon: Michael Castaneda MD;  Location: [facility] OR;  Service: Obstetrics/Gynecology    DILATATION AND CURETTAGE      X 2    ENDOSCOPY      ENDOSCOPY N/A 05/09/2022    Procedure: ESOPHAGOGASTRODUODENOSCOPY WITH ANESTHESIA;  Surgeon: Mono Linder  MD;  Location: [facility] ENDOSCOPY;  Service: Gastroenterology;  Laterality: N/A;  pre screen  post gastric polyps  Dr. Soliman    ENDOSCOPY N/A 11/28/2022    Procedure: ESOPHAGOGASTRODUODENOSCOPY WITH ANESTHESIA;  Surgeon: Mono Linder MD;  Location: [facility] ENDOSCOPY;  Service: Gastroenterology;  Laterality: N/A;  pre: hx gastric polyp  post: Retained food, gastritis  dr sebastián soliman    ENDOSCOPY N/A 12/29/2022    Procedure: ESOPHAGOGASTRODUODENOSCOPY;  Surgeon: Mono Linder MD;  Location: [facility] ENDOSCOPY;  Service: Gastroenterology;  Laterality: N/A;  preop; hx of polyps  postop gastric polyps  Bette Coffman MD    ENDOSCOPY N/A 06/07/2023    Procedure: ESOPHAGOGASTRODUODENOSCOPY;  Surgeon: Mono Linder MD;  Location: [facility] ENDOSCOPY;  Service: Gastroenterology;  Laterality: N/A;  pre hx gastric polyp  post normal  dr bette brewer    ENDOSCOPY N/A 6/13/2024    Procedure: ESOPHAGOGASTRODUODENOSCOPY WITH ANESTHESIA;  Surgeon: Mono Linder MD;  Location: [facility] ENDOSCOPY;  Service: Gastroenterology;  Laterality: N/A;  pre: gastric polyps  post: gastric polyp.   roseline-osman    HYSTERECTOMY      TONSILLECTOMY       Family History   Problem Relation Age of Onset    Diabetes Other     Heart failure Other     Cancer Other     Kidney disease Other     Lung cancer Mother     Heart disease Father     Diabetes Father     Obesity Father     Stroke Father     Prostate cancer Father     Cancer Maternal Grandmother     Uterine cancer Maternal Grandmother     Diabetes Maternal Grandfather     Cancer Paternal Grandmother     Colon cancer Paternal Grandmother     Diabetes Paternal Grandfather     Heart disease Paternal Grandfather     No Known Problems Sister     No Known Problems Brother     No Known Problems Daughter     No Known Problems Maternal Aunt     No Known Problems Paternal Aunt     BRCA 1/2 Neg Hx     Breast cancer Neg Hx     Endometrial cancer Neg Hx     Ovarian cancer Neg Hx       Social History     Socioeconomic History    Marital status:    Tobacco Use    Smoking status: Never     Passive exposure: Never    Smokeless tobacco: Never   Vaping Use    Vaping status: Never Used   Substance and Sexual Activity    Alcohol use: No    Drug use: No    Sexual activity: Defer     Birth control/protection: Post-menopausal, Hysterectomy     Allergies   Allergen Reactions    Codeine Nausea And Vomiting and GI Intolerance     Current Outpatient Medications   Medication Sig Dispense Refill    allopurinol (ZYLOPRIM) 100 MG tablet Take 1 tablet by mouth Daily. 30 tablet 11    aspirin 81 MG EC tablet Take 1 tablet by mouth Daily.      buPROPion SR (WELLBUTRIN SR) 100 MG 12 hr tablet       citalopram (CeleXA) 40 MG tablet Take 1 tablet by mouth Daily. 30 tablet 0    Continuous Blood Gluc Sensor (FreeStyle Filippo 2 Sensor) misc 1 each Every 14 (Fourteen) Days. Left arm 2 each 3    diazePAM (VALIUM) 2 MG tablet Take 1 tablet by mouth 3 (Three) Times a Day.      famotidine (PEPCID) 20 MG tablet Take 2 tablets by mouth 2 (Two) Times a Day. 120 tablet 11    fluticasone (FLONASE) 50 MCG/ACT nasal spray Administer 1 spray into the nostril(s) as directed by provider 2 (Two) Times a Day.      Heparin Sod, Pork, Lock Flush (Heparin Na, Pork, Lock Flsh PF) 1 UNIT/ML solution lock flush 6,000 mL by Intracatheter route Every 8 (Eight) Hours. Administers before every dialysis treatment, which is 4-5 times a week at home      HUMULIN R 500 UNIT/ML CONCENTRATED injection INJECT UNDER THE SKIN INTO THE APPROPRIATE AREA 3 TIMES A DAY BEFORE MEALS 40 mL 11    Insulin Disposable Pump (Omnipod 5 G6 Pod, Gen 5,) misc 1 each Every Other Day. 15 each 6    lamoTRIgine (LaMICtal) 100 MG tablet 1/2 QD      losartan (COZAAR) 50 MG tablet Take 1 tablet by mouth Daily.      Misc. Devices (Bariatric Rollator) misc Please provide device for patient 1 each 0    Repatha solution prefilled syringe injection Inject 1 mL under theskin  into the appropriate area as directed Every 14 (Fourteen) Days. 2 mL 4    rosuvastatin (CRESTOR) 20 MG tablet Take 1 tablet by mouth Every Night. 30 tablet 0    Semaglutide, 2 MG/DOSE, (Ozempic, 2 MG/DOSE,) 8 MG/3ML solution pen-injector Inject 2 mg under the skin into the appropriate area as directed 1 (One) Time Per Week. Please cancel Trulicity 3 mL 3    sevelamer (RENVELA) 800 MG tablet Take 2 tablets with each meal and 2 tablets with a snack      calcitriol (ROCALTROL) 0.5 MCG capsule Take 1 capsule by mouth Daily. (Patient not taking: Reported on 6/5/2025)      carvedilol (COREG) 3.125 MG tablet Take 1 tablet by mouth 2 (Two) Times a Day. (Patient not taking: Reported on 6/5/2025)      cetirizine (zyrTEC) 10 MG tablet Take 1 tablet by mouth. (Patient not taking: Reported on 6/5/2025)      Insulin Lispro, 1 Unit Dial, (HumaLOG KwikPen) 100 UNIT/ML solution pen-injector Inject  under the skin into the appropriate area as directed Daily. (Patient not taking: Reported on 6/5/2025)      levothyroxine (Synthroid) 112 MCG tablet Take 1 tablet by mouth Daily. 30 tablet 11    lisinopril (PRINIVIL,ZESTRIL) 20 MG tablet 2 (Two) Times a Day. (Patient not taking: Reported on 6/5/2025)      Multiple Vitamins-Minerals (ProRenal + D w/ Omega-3) capsule Take 1 capsule by mouth Daily. (Patient not taking: Reported on 6/5/2025)      potassium chloride (KLOR-CON M20) 20 MEQ CR tablet  (Patient not taking: Reported on 6/5/2025)      traZODone (DESYREL) 100 MG tablet Take 1 tablet by mouth Every Night.       No current facility-administered medications for this visit.     Review of Systems   Constitutional:  Negative for chills and fever.   HENT:  Negative for congestion.    Respiratory:  Negative for shortness of breath.    Cardiovascular:  Positive for leg swelling. Negative for chest pain.   Gastrointestinal:  Negative for constipation, diarrhea, nausea and vomiting.   Musculoskeletal:  Positive for arthralgias and gait problem.         Foot pain   Skin:  Negative for wound.   Neurological:  Positive for numbness. Negative for dizziness and weakness.   Hematological:  Does not bruise/bleed easily.   Psychiatric/Behavioral:  Negative for agitation, behavioral problems and confusion.        OBJECTIVE     Vitals:    25 0808   BP: 130/88   Pulse: 86   SpO2: 95%       PHYSICAL EXAM  GEN:   Accompanied by none.     Foot/Ankle Exam    GENERAL  Diabetic foot exam performed    Appearance:  obese  Orientation:  AAOx3  Affect:  appropriate  Gait:  (Unsteady)  Assistance:  wheelchair  Right shoe gear: casual shoe  Left shoe gear: casual shoe    VASCULAR     Right Foot Vascularity   Dorsalis pedis:  2+  Posterior tibial:  2+  Skin temperature:  warm  Edema gradin+ and non-pitting  CFT:  3  Pedal hair growth:  Present  Varicosities:  none     Left Foot Vascularity   Dorsalis pedis:  2+  Posterior tibial:  2+  Skin temperature:  warm  Edema gradin+ and non-pitting  CFT:  3  Pedal hair growth:  Present  Varicosities:  none     NEUROLOGIC     Right Foot Neurologic   Light touch sensation: diminished  Vibratory sensation: normal  Hot/Cold sensation: normal  Protective Sensation using [place] Monofilament:   Sites intact: 8  Sites tested: 10     Left Foot Neurologic   Light touch sensation: diminished  Vibratory sensation: normal  Hot/Cold sensation:  normal  Protective Sensation using [place] Monofilament:   Sites intact: 8  Sites tested: 10    MUSCULOSKELETAL     Right Foot Musculoskeletal   Ecchymosis:  none  Tenderness:  toenail problem    Arch:  Normal  Hallux valgus: No       Left Foot Musculoskeletal   Ecchymosis:  none  Tenderness:  toenail problem  Arch:  Normal  Hallux valgus: No      MUSCLE STRENGTH     Right Foot Muscle Strength   Foot dorsiflexion:  5  Foot plantar flexion:  5  Foot inversion:  5  Foot eversion:  5     Left Foot Muscle Strength   Foot dorsiflexion:  5  Foot plantar flexion:  5  Foot inversion:   5  Foot eversion:  5    RANGE OF MOTION     Right Foot Range of Motion   Foot and ankle ROM within normal limits       Left Foot Range of Motion   Foot and ankle ROM within normal limits      DERMATOLOGIC      Right Foot Dermatologic   Skin  Right foot skin is intact.   Nails  1.  Positive for onychomycosis, abnormal thickness and subungual debris. (Callus to nail bed)  2.  Positive for abnormal thickness and subungual debris.  3.  Positive for onychomycosis, abnormal thickness and subungual debris.  4.  Positive for onychomycosis, abnormal thickness and subungual debris.  5.  Positive for onychomycosis, abnormal thickness and subungual debris.     Left Foot Dermatologic   Skin  Left foot skin is intact.   Nails  1.  Absent.  2.  Positive for onychomycosis, abnormal thickness and subungual debris.  3.  Positive for onychomycosis, abnormal thickness, subungual debris and dystrophic nail.  4.  Positive for onychomycosis, abnormally thick and subungual debris.  5.  Positive for onychomycosis, abnormally thick and subungual debris.    Image:       RADIOLOGY/NUCLEAR:  No results found.    LABORATORY/CULTURE RESULTS:      PATHOLOGY RESULTS:       ASSESSMENT/PLAN     Diagnoses and all orders for this visit:    1. Onychomycosis (Primary)    2. Type 2 diabetes mellitus with diabetic polyneuropathy, without long-term current use of insulin    3. Stage 5 chronic kidney disease on chronic dialysis    4. Gait abnormality    5. Retrocalcaneal exostosis        Comprehensive lower extremity examination and evaluation was performed.  Discussed findings and treatment plan including risks, benefits, and treatment options with patient in detail. Patient agreed with treatment plan.  Hemoglobin A1c reviewed.  CMP reviewed.  After verbal consent obtained, nail(s) x9 debrided of length and thickness with nail nipper without incidence  After verbal consent obtained, calluses x1 pared utilizing dermal curette and/or scalpel without  incidence  Patient may maintain nails and calluses at home utilizing emery board or pumice stone between visits as needed  Reviewed at home diabetic foot care including daily foot checks   Patient is at an increased risk of foot complications due to chronic kidney disease.    Continue to follow up with PCP for treatment of CKD and routine labs.    Continue diabetic monitoring and control under direction of PCP.  Continue to elevate lower extremities while at rest.   Continue use of wheelchair for ambulation support.  An After Visit Summary was printed and given to the patient at discharge, including (if requested) any available informative/educational handouts regarding diagnosis, treatment, or medications. All questions were answered to patient/family satisfaction. Should symptoms fail to improve or worsen they agree to call or return to clinic or to go to the Emergency Department. Discussed the importance of following up with any needed screening tests/labs/specialist appointments and any requested follow-up recommended by me today. Importance of maintaining follow-up discussed and patient accepts that missed appointments can delay diagnosis and potentially lead to worsening of conditions.   IReturn in about 2 months (around 8/4/2025) for Diabetic foot care clinic, With Bria RAY., or sooner if acute issues arise.        This document has been electronically signed by FLOR Byrd on June 5, 2025 09:24 CDT

## 2025-06-05 ENCOUNTER — OFFICE VISIT (OUTPATIENT)
Age: 65
End: 2025-06-05
Payer: MEDICARE

## 2025-06-05 VITALS
DIASTOLIC BLOOD PRESSURE: 88 MMHG | BODY MASS INDEX: 47.09 KG/M2 | HEIGHT: 66 IN | OXYGEN SATURATION: 95 % | SYSTOLIC BLOOD PRESSURE: 130 MMHG | HEART RATE: 86 BPM | WEIGHT: 293 LBS

## 2025-06-05 DIAGNOSIS — N18.6 STAGE 5 CHRONIC KIDNEY DISEASE ON CHRONIC DIALYSIS: ICD-10-CM

## 2025-06-05 DIAGNOSIS — Z99.2 STAGE 5 CHRONIC KIDNEY DISEASE ON CHRONIC DIALYSIS: ICD-10-CM

## 2025-06-05 DIAGNOSIS — M89.8X7 RETROCALCANEAL EXOSTOSIS: ICD-10-CM

## 2025-06-05 DIAGNOSIS — E11.42 TYPE 2 DIABETES MELLITUS WITH DIABETIC POLYNEUROPATHY, WITHOUT LONG-TERM CURRENT USE OF INSULIN: ICD-10-CM

## 2025-06-05 DIAGNOSIS — B35.1 ONYCHOMYCOSIS: Primary | ICD-10-CM

## 2025-06-05 DIAGNOSIS — R26.9 GAIT ABNORMALITY: ICD-10-CM

## 2025-06-11 ENCOUNTER — HOSPITAL ENCOUNTER (EMERGENCY)
Age: 65
Discharge: HOME | End: 2025-06-12
Attending: EMERGENCY MEDICINE
Payer: MEDICARE

## 2025-06-11 DIAGNOSIS — S30.0XXA CONTUSION OF LEFT BUTTOCK: ICD-10-CM

## 2025-06-11 DIAGNOSIS — S63.502A SPRAIN OF LEFT WRIST, INITIAL ENCOUNTER: ICD-10-CM

## 2025-06-11 DIAGNOSIS — S50.312A ABRASION OF LEFT ELBOW, INITIAL ENCOUNTER: ICD-10-CM

## 2025-06-11 DIAGNOSIS — J90 BILATERAL PLEURAL EFFUSION: ICD-10-CM

## 2025-06-11 DIAGNOSIS — M25.512 ACUTE PAIN OF LEFT SHOULDER: ICD-10-CM

## 2025-06-11 DIAGNOSIS — G89.29 CHRONIC PAIN OF LEFT KNEE: ICD-10-CM

## 2025-06-11 DIAGNOSIS — M25.562 CHRONIC PAIN OF LEFT KNEE: ICD-10-CM
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
## 2025-06-11 DIAGNOSIS — S60.222A CONTUSION OF LEFT HAND, INITIAL ENCOUNTER: ICD-10-CM

## 2025-06-11 DIAGNOSIS — S50.02XA CONTUSION OF LEFT ELBOW, INITIAL ENCOUNTER: ICD-10-CM

## 2025-06-11 PROCEDURE — 99284 EMERGENCY DEPT VISIT MOD MDM: CPT

## 2025-06-11 ASSESSMENT — PAIN DESCRIPTION - DESCRIPTORS: DESCRIPTORS: ACHING;SORE

## 2025-06-11 ASSESSMENT — PAIN SCALES - GENERAL: PAINLEVEL_OUTOF10: 7

## 2025-06-11 ASSESSMENT — PAIN DESCRIPTION - ORIENTATION: ORIENTATION: LEFT

## 2025-06-11 ASSESSMENT — PAIN - FUNCTIONAL ASSESSMENT: PAIN_FUNCTIONAL_ASSESSMENT: 0-10

## 2025-06-11 ASSESSMENT — PAIN DESCRIPTION - LOCATION: LOCATION: BACK;ARM;WRIST

## 2025-06-12 ENCOUNTER — APPOINTMENT (OUTPATIENT)
Dept: GENERAL RADIOLOGY | Age: 65
End: 2025-06-12
Payer: MEDICARE

## 2025-06-12 ENCOUNTER — APPOINTMENT (OUTPATIENT)
Dept: CT IMAGING | Age: 65
End: 2025-06-12
Payer: MEDICARE

## 2025-06-12 ENCOUNTER — TRANSCRIBE ORDERS (OUTPATIENT)
Dept: ADMINISTRATIVE | Facility: HOSPITAL | Age: 65
End: 2025-06-12
Payer: MEDICARE

## 2025-06-12 VITALS
RESPIRATION RATE: 16 BRPM | HEIGHT: 65 IN | HEART RATE: 70 BPM | TEMPERATURE: 97.7 F | DIASTOLIC BLOOD PRESSURE: 80 MMHG | BODY MASS INDEX: 39.99 KG/M2 | SYSTOLIC BLOOD PRESSURE: 130 MMHG | WEIGHT: 240 LBS | OXYGEN SATURATION: 98 %

## 2025-06-12 DIAGNOSIS — E03.9 ACQUIRED HYPOTHYROIDISM: ICD-10-CM

## 2025-06-12 DIAGNOSIS — E10.649 TYPE 1 DIABETES MELLITUS WITH HYPOGLYCEMIA AND WITHOUT COMA: Primary | ICD-10-CM

## 2025-06-12 DIAGNOSIS — E55.9 VITAMIN D DEFICIENCY: ICD-10-CM

## 2025-06-12 DIAGNOSIS — Z79.4 TYPE 2 DIABETES MELLITUS WITH HYPOGLYCEMIA WITHOUT COMA, WITH LONG-TERM CURRENT USE OF INSULIN: Primary | ICD-10-CM

## 2025-06-12 DIAGNOSIS — E11.649 TYPE 2 DIABETES MELLITUS WITH HYPOGLYCEMIA WITHOUT COMA, WITH LONG-TERM CURRENT USE OF INSULIN: Primary | ICD-10-CM

## 2025-06-12 DIAGNOSIS — I10 ESSENTIAL HYPERTENSION: ICD-10-CM

## 2025-06-12 DIAGNOSIS — E53.8 B12 DEFICIENCY: ICD-10-CM

## 2025-06-12 DIAGNOSIS — E78.5 DYSLIPIDEMIA: ICD-10-CM

## 2025-06-12 PROCEDURE — 73130 X-RAY EXAM OF HAND: CPT

## 2025-06-12 PROCEDURE — 73502 X-RAY EXAM HIP UNI 2-3 VIEWS: CPT

## 2025-06-12 PROCEDURE — 73030 X-RAY EXAM OF SHOULDER: CPT

## 2025-06-12 PROCEDURE — 72192 CT PELVIS W/O DYE: CPT

## 2025-06-12 PROCEDURE — 73080 X-RAY EXAM OF ELBOW: CPT

## 2025-06-12 PROCEDURE — 73110 X-RAY EXAM OF WRIST: CPT

## 2025-06-12 PROCEDURE — 71045 X-RAY EXAM CHEST 1 VIEW: CPT

## 2025-06-12 ASSESSMENT — ENCOUNTER SYMPTOMS
SHORTNESS OF BREATH: 0
ABDOMINAL PAIN: 0
BACK PAIN: 0
DIARRHEA: 0
EYE PAIN: 0
VOMITING: 0

## 2025-06-12 ASSESSMENT — PAIN - FUNCTIONAL ASSESSMENT: PAIN_FUNCTIONAL_ASSESSMENT: 0-10

## 2025-06-12 ASSESSMENT — PAIN SCALES - GENERAL: PAINLEVEL_OUTOF10: 7

## 2025-06-12 NOTE — ED PROVIDER NOTES
throughout   Skin:     General: Skin is warm and dry.      Capillary Refill: Capillary refill takes less than 2 seconds.   Neurological:      General: No focal deficit present.      Mental Status: She is alert and oriented to person, place, and time.   Psychiatric:         Mood and Affect: Mood normal.         Behavior: Behavior normal.         DIAGNOSTIC RESULTS     EKG: All EKG's are interpreted by the Emergency Department Physician who either signs or Co-signs this chart in the absence of a cardiologist.        RADIOLOGY:   Non-plain film images such as CT, Ultrasound and MRI are read by the radiologist. Plainradiographic images are visualized and preliminarily interpreted by the emergency physician with the below findings:        Interpretation per the Radiologist below, if available at the time of this note:    XR HIP 2-3 VW W PELVIS LEFT   Final Result   FINDINGS / IMPRESSION:       No acute fracture, dislocation, or diastasis.       Bones appear demineralized.       Scattered arterial calcifications. IVC filter present.           ______________________________________    Electronically signed by: RHETT HANSON M.D.   Date:     06/12/2025   Time:    01:24       XR SHOULDER LEFT (MIN 2 VIEWS)   Final Result   FINDINGS / IMPRESSION:       No acute fracture or dislocation.       Mild degenerative changes. Bones appear demineralized.           ______________________________________    Electronically signed by: RHETT HANSON M.D.   Date:     06/12/2025   Time:    01:23       XR CHEST PORTABLE   Final Result   Impression:       Bilateral pleural effusions with adjacent atelectasis.  No acute findings of the chest otherwise.           ______________________________________    Electronically signed by: NITZA SIMS M.D.   Date:     06/12/2025   Time:    01:29       XR ELBOW LEFT (MIN 3 VIEWS)   Final Result   FINDINGS / IMPRESSION:       No acute fracture or dislocation. No joint effusion.       Scattered

## 2025-06-13 ENCOUNTER — LAB (OUTPATIENT)
Dept: LAB | Facility: HOSPITAL | Age: 65
End: 2025-06-13
Payer: MEDICARE

## 2025-06-13 DIAGNOSIS — E10.649 TYPE 1 DIABETES MELLITUS WITH HYPOGLYCEMIA AND WITHOUT COMA: ICD-10-CM

## 2025-06-13 DIAGNOSIS — E55.9 VITAMIN D DEFICIENCY: ICD-10-CM

## 2025-06-13 LAB
25(OH)D3 SERPL-MCNC: 56.1 NG/ML (ref 30–100)
ALBUMIN SERPL-MCNC: 3.7 G/DL (ref 3.5–5)
ALBUMIN UR-MCNC: 54.8 MG/DL
ALBUMIN/GLOB SERPL: 1.1 G/DL (ref 1.1–2.5)
ALP SERPL-CCNC: 94 U/L (ref 24–120)
ALT SERPL W P-5'-P-CCNC: 12 U/L (ref 0–35)
ANION GAP SERPL CALCULATED.3IONS-SCNC: 16 MMOL/L (ref 4–13)
AST SERPL-CCNC: 19 U/L (ref 7–45)
AUTO MIXED CELLS #: 0.8 10*3/MM3 (ref 0.1–2.6)
AUTO MIXED CELLS %: 8.3 % (ref 0.1–24)
BILIRUB SERPL-MCNC: 0.3 MG/DL (ref 0.1–1)
BUN SERPL-MCNC: 53 MG/DL (ref 5–21)
BUN/CREAT SERPL: 5.4
CALCIUM SPEC-SCNC: 9.6 MG/DL (ref 8.6–10.5)
CHLORIDE SERPL-SCNC: 89 MMOL/L (ref 98–110)
CHOLEST SERPL-MCNC: 96 MG/DL (ref 130–200)
CO2 SERPL-SCNC: 26 MMOL/L (ref 24–31)
CREAT SERPL-MCNC: 9.9 MG/DL (ref 0.5–1.4)
EGFRCR SERPLBLD CKD-EPI 2021: 4 ML/MIN/1.73
ERYTHROCYTE [DISTWIDTH] IN BLOOD BY AUTOMATED COUNT: 16.2 % (ref 12.3–15.4)
GLOBULIN UR ELPH-MCNC: 3.3 GM/DL
GLUCOSE SERPL-MCNC: 172 MG/DL (ref 65–99)
HBA1C MFR BLD: 6.9 % (ref 4.8–5.9)
HCT VFR BLD AUTO: 38.1 % (ref 34–46.6)
HDLC SERPL-MCNC: 36 MG/DL
HGB BLD-MCNC: 11.9 G/DL (ref 12–15.9)
LDLC SERPL CALC-MCNC: 34 MG/DL (ref 0–99)
LDLC/HDLC SERPL: 0.79 {RATIO}
LYMPHOCYTES # BLD AUTO: 1.3 10*3/MM3 (ref 0.7–3.1)
LYMPHOCYTES NFR BLD AUTO: 13.8 % (ref 19.6–45.3)
MCH RBC QN AUTO: 28.9 PG (ref 26.6–33)
MCHC RBC AUTO-ENTMCNC: 31.2 G/DL (ref 31.5–35.7)
MCV RBC AUTO: 92.5 FL (ref 79–97)
NEUTROPHILS NFR BLD AUTO: 7.5 10*3/MM3 (ref 1.7–7)
NEUTROPHILS NFR BLD AUTO: 77.9 % (ref 42.7–76)
PLATELET # BLD AUTO: 214 10*3/MM3 (ref 140–450)
PMV BLD AUTO: 8.5 FL (ref 6–12)
POTASSIUM SERPL-SCNC: 3.8 MMOL/L (ref 3.5–5.3)
PROT SERPL-MCNC: 7 G/DL (ref 6.3–8.7)
RBC # BLD AUTO: 4.12 10*6/MM3 (ref 3.77–5.28)
SODIUM SERPL-SCNC: 131 MMOL/L (ref 135–145)
TRIGL SERPL-MCNC: 157 MG/DL (ref 0–149)
TSH SERPL DL<=0.05 MIU/L-ACNC: 2.11 UIU/ML (ref 0.27–4.2)
VIT B12 BLD-MCNC: 635 PG/ML (ref 211–946)
VLDLC SERPL-MCNC: 26 MG/DL (ref 5–40)
WBC NRBC COR # BLD AUTO: 9.6 10*3/MM3 (ref 3.4–10.8)

## 2025-06-13 PROCEDURE — 85025 COMPLETE CBC W/AUTO DIFF WBC: CPT

## 2025-06-13 PROCEDURE — 83036 HEMOGLOBIN GLYCOSYLATED A1C: CPT

## 2025-06-13 PROCEDURE — 82607 VITAMIN B-12: CPT

## 2025-06-13 PROCEDURE — 82306 VITAMIN D 25 HYDROXY: CPT

## 2025-06-13 PROCEDURE — 80053 COMPREHEN METABOLIC PANEL: CPT

## 2025-06-13 PROCEDURE — 80061 LIPID PANEL: CPT

## 2025-06-13 PROCEDURE — 36415 COLL VENOUS BLD VENIPUNCTURE: CPT

## 2025-06-13 PROCEDURE — 82043 UR ALBUMIN QUANTITATIVE: CPT

## 2025-06-13 PROCEDURE — 84443 ASSAY THYROID STIM HORMONE: CPT

## 2025-06-15 NOTE — PROGRESS NOTES
Progress Note      Pt Name: Sara Pardo  YOB: 1960  MRN: [id number]    Date of evaluation: 1/18/2024  History Obtained From:  patient, electronic medical record    CHIEF COMPLAINT:    Chief Complaint   Patient presents with    Follow-up     Leukocytosis, unspecified type         Current active problems  Reactive leukocytosis  Anemia secondary to stage V chronic renal failure    HISTORY OF PRESENT ILLNESS:    Sara Pardo is a 63 y.o.  female seen initially in the office on 3/30/2023 for persistent leukocytosis.  Serology was unrevealing, this is most likely reactive.  She also has severe anemia with a hemoglobin in the 7 range, she has stage V chronic renal failure and is on hemodialysis at home.  She has received Retacrit most recently 3 days a week, currently that is on hold.  She has received IV iron as well.  She denies any transfusions.  Overall she has been doing well.      She is diabetic, most recent A1c was 6.0 on 4/17/2023.  She has essential hypertension and reports that her blood pressure has been fairly stable on carvedilol 6.25 mg twice daily.  He does have issues with hyperphosphatemia-she is on phosphate binders-has constipation issues but also has diarrhea at times.  She has seasonal allergies and takes Zyrtec as needed.  Anxiety is controlled with Wellbutrin 150 mg daily, Celexa 40 mg daily, Valium 2 mg as needed.  She has hypothyroidism which has been stable on levothyroxine 112 mcg daily.      HEMATOLOGY HISTORY   Sara was seen in initial hematology consultation on 3/30/2023 referred by Dr. Haskins for persistent leukocytosis.      She has stage V chronic renal failure from diabetic nephropathy as well as hypertensive nephrosclerosis on chronic hemodialysis since 2021.  He does receive Mircera every 2 weeks.  He previously received IV iron as needed.     She previously was followed by [facility] hematology where she received JEAN CLAUDE prior to starting on hemodialysis.   (2) more than 100 beats/min

## 2025-06-17 ENCOUNTER — OFFICE VISIT (OUTPATIENT)
Age: 65
End: 2025-06-17
Payer: MEDICARE

## 2025-06-17 DIAGNOSIS — M17.0 BILATERAL PRIMARY OSTEOARTHRITIS OF KNEE: Primary | ICD-10-CM

## 2025-06-17 PROCEDURE — G8399 PT W/DXA RESULTS DOCUMENT: HCPCS | Performed by: PHYSICIAN ASSISTANT

## 2025-06-17 PROCEDURE — 3017F COLORECTAL CA SCREEN DOC REV: CPT | Performed by: PHYSICIAN ASSISTANT

## 2025-06-17 PROCEDURE — 20610 DRAIN/INJ JOINT/BURSA W/O US: CPT | Performed by: PHYSICIAN ASSISTANT

## 2025-06-17 PROCEDURE — 1036F TOBACCO NON-USER: CPT | Performed by: PHYSICIAN ASSISTANT

## 2025-06-17 PROCEDURE — G8427 DOCREV CUR MEDS BY ELIG CLIN: HCPCS | Performed by: PHYSICIAN ASSISTANT

## 2025-06-17 PROCEDURE — G8417 CALC BMI ABV UP PARAM F/U: HCPCS | Performed by: PHYSICIAN ASSISTANT

## 2025-06-17 PROCEDURE — 1090F PRES/ABSN URINE INCON ASSESS: CPT | Performed by: PHYSICIAN ASSISTANT

## 2025-06-17 PROCEDURE — 99213 OFFICE O/P EST LOW 20 MIN: CPT | Performed by: PHYSICIAN ASSISTANT

## 2025-06-17 PROCEDURE — 1123F ACP DISCUSS/DSCN MKR DOCD: CPT | Performed by: PHYSICIAN ASSISTANT

## 2025-06-17 RX ORDER — LIDOCAINE HYDROCHLORIDE 10 MG/ML
1 INJECTION, SOLUTION INFILTRATION; PERINEURAL ONCE
Status: COMPLETED | OUTPATIENT
Start: 2025-06-17 | End: 2025-06-17

## 2025-06-17 RX ORDER — BETAMETHASONE SODIUM PHOSPHATE AND BETAMETHASONE ACETATE 3; 3 MG/ML; MG/ML
6 INJECTION, SUSPENSION INTRA-ARTICULAR; INTRALESIONAL; INTRAMUSCULAR; SOFT TISSUE ONCE
Status: COMPLETED | OUTPATIENT
Start: 2025-06-17 | End: 2025-06-17

## 2025-06-17 RX ORDER — BUPIVACAINE HYDROCHLORIDE 5 MG/ML
3 INJECTION, SOLUTION EPIDURAL; INTRACAUDAL; PERINEURAL ONCE
Status: COMPLETED | OUTPATIENT
Start: 2025-06-17 | End: 2025-06-17

## 2025-06-17 RX ADMIN — BUPIVACAINE HYDROCHLORIDE 15 MG: 5 INJECTION, SOLUTION EPIDURAL; INTRACAUDAL; PERINEURAL at 13:50

## 2025-06-17 RX ADMIN — BETAMETHASONE SODIUM PHOSPHATE AND BETAMETHASONE ACETATE 6 MG: 3; 3 INJECTION, SUSPENSION INTRA-ARTICULAR; INTRALESIONAL; INTRAMUSCULAR; SOFT TISSUE at 13:49

## 2025-06-17 RX ADMIN — BETAMETHASONE SODIUM PHOSPHATE AND BETAMETHASONE ACETATE 6 MG: 3; 3 INJECTION, SUSPENSION INTRA-ARTICULAR; INTRALESIONAL; INTRAMUSCULAR; SOFT TISSUE at 13:50

## 2025-06-17 RX ADMIN — LIDOCAINE HYDROCHLORIDE 1 ML: 10 INJECTION, SOLUTION INFILTRATION; PERINEURAL at 13:52

## 2025-06-17 NOTE — PROGRESS NOTES
encounter. Time spent included evaluating the patient's chart prior to arrival.  Evaluating the patient in the office including history, physical examination, imaging reviewing, and counseling on next steps.  Lastly, time was spent discussing orders with my staff as well as providing documentation in the chart.     Return in about 3 months (around 9/17/2025) for bilateral knee injection.   No orders of the defined types were placed in this encounter.        Electronically signed by Ed Pardo PA-C on 6/17/2025 at 1:35 PM.    Dragon Disclaimer:   This note was dictated with voice recognition software.  Though review and corrections are routine, we apologize for any errors.

## 2025-06-30 DIAGNOSIS — F33.1 MAJOR DEPRESSIVE DISORDER, RECURRENT, MODERATE (HCC): ICD-10-CM

## 2025-06-30 RX ORDER — CITALOPRAM HYDROBROMIDE 40 MG/1
40 TABLET ORAL DAILY
Qty: 90 TABLET | Refills: 1 | Status: SHIPPED | OUTPATIENT
Start: 2025-06-30

## 2025-06-30 NOTE — TELEPHONE ENCOUNTER
Sara M Edvin called to request a refill on her medication.      Last office visit : 4/22/2025   Next office visit : 10/23/2025     Requested Prescriptions     Signed Prescriptions Disp Refills    citalopram (CELEXA) 40 MG tablet 90 tablet 1     Sig: TAKE ONE TABLET BY MOUTH EVERY DAY     Authorizing Provider: BENJAMIN LOPEZ     Ordering User: BIGG GARRETT LPN

## 2025-07-03 DIAGNOSIS — G47.09 OTHER INSOMNIA: ICD-10-CM

## 2025-07-03 RX ORDER — TRAZODONE HYDROCHLORIDE 100 MG/1
100 TABLET ORAL NIGHTLY
Qty: 90 TABLET | Refills: 0 | Status: SHIPPED | OUTPATIENT
Start: 2025-07-03 | End: 2025-12-30

## 2025-07-14 ENCOUNTER — TELEPHONE (OUTPATIENT)
Dept: PSYCHIATRY | Age: 65
End: 2025-07-14

## 2025-07-14 NOTE — TELEPHONE ENCOUNTER
Called patient to remind them of their appointment     -Pt confirmed      Reminded patient of their     copay for their appointment. Reminded patient to complete their visit pre-check/digital registration in [employer]    Electronically signed by Alicia D Giraldo-Reyes on 7/14/2025 at 4:00 PM

## 2025-07-15 ENCOUNTER — OFFICE VISIT (OUTPATIENT)
Dept: PSYCHIATRY | Age: 65
End: 2025-07-15
Payer: MEDICARE

## 2025-07-15 DIAGNOSIS — F41.1 GENERALIZED ANXIETY DISORDER: ICD-10-CM

## 2025-07-15 DIAGNOSIS — G47.00 INSOMNIA, UNSPECIFIED TYPE: ICD-10-CM

## 2025-07-15 DIAGNOSIS — F33.0 MAJOR DEPRESSIVE DISORDER, RECURRENT, MILD: Primary | ICD-10-CM

## 2025-07-15 PROCEDURE — 1090F PRES/ABSN URINE INCON ASSESS: CPT | Performed by: PSYCHIATRY & NEUROLOGY

## 2025-07-15 PROCEDURE — 1036F TOBACCO NON-USER: CPT | Performed by: PSYCHIATRY & NEUROLOGY

## 2025-07-15 PROCEDURE — 1123F ACP DISCUSS/DSCN MKR DOCD: CPT | Performed by: PSYCHIATRY & NEUROLOGY

## 2025-07-15 PROCEDURE — G8399 PT W/DXA RESULTS DOCUMENT: HCPCS | Performed by: PSYCHIATRY & NEUROLOGY

## 2025-07-15 PROCEDURE — G8428 CUR MEDS NOT DOCUMENT: HCPCS | Performed by: PSYCHIATRY & NEUROLOGY

## 2025-07-15 PROCEDURE — 99215 OFFICE O/P EST HI 40 MIN: CPT | Performed by: PSYCHIATRY & NEUROLOGY

## 2025-07-15 PROCEDURE — G8417 CALC BMI ABV UP PARAM F/U: HCPCS | Performed by: PSYCHIATRY & NEUROLOGY

## 2025-07-15 PROCEDURE — 3017F COLORECTAL CA SCREEN DOC REV: CPT | Performed by: PSYCHIATRY & NEUROLOGY

## 2025-07-15 RX ORDER — TRAZODONE HYDROCHLORIDE 100 MG/1
200 TABLET ORAL NIGHTLY
Qty: 180 TABLET | Refills: 3 | Status: SHIPPED | OUTPATIENT
Start: 2025-07-15 | End: 2025-10-13

## 2025-07-15 NOTE — PROGRESS NOTES
7/15/2025 2:17 PM   Progress Note          Sara BHATIA Edvin 1960      Chief Complaint   Patient presents with    Depression    Anxiety    Insomnia         Subjective:    65-year-old white female with history of depression and anxiety, obesity, diabetes, ESRD on dialysis, hypothyroidism, obstructive sleep apnea on CPAP, presents for follow up.  She is on Wellbutrin, Celexa, trazodone - takes 200 mg for sleep. No SE    Patient is calm and cooperative.  Tearful and smiling at times.   Mild depression. No suicidal thoughts.  Financial stressors. Family stress. Supportive therapy provided. Coping well. Not seeing a therapist - unable to afford.        LMP 01/01/2000       Review of Systems - 14 point review:  Negative except for    Constitutional: (fevers, chills, night sweats, wt loss/gain, change in appetite, fatigue, somnolence)    HEENT: (ear pain or discharge, hearing loss, ear ringing, sinus pressure, nosebleed, nasal discharge, sore throat, oral sores, tooth pain, bleeding gums, hoarse voice, neck pain)      Cardiovascular: (HTN, chest pain, elevated cholesterol/lipids, palpitations, leg swelling, leg pain with walking)    Respiratory: (cough, wheezing, snoring, SOB with activity (dyspnea), SOB while lying flat (orthopnea), awakening with severe SOB (paroxysmal nocturnal dyspnea))    Gastrointestinal: (NVD, constipation, abdominal pain, bright red stools, black tarry stools, stool incontinence)     Genitourinary:  (pelvic pain, burning or frequency of urination, urinary urgency, blood in urine incomplete bladder emptying, urinary incontinence, STD; MEN: testicular pain or swelling, erectile dysfunction; WOMEN: LMP, heavy menstrual bleeding (menorrhagia), irregular periods, postmenopausal bleeding, menstrual pain (dymenorrhea, vaginal discharge)    Musculoskeletal: (bone pain/fracture, joint pain or swelling, musle pain)    Integumentary: (rashes, acne, non-healing sores, itching, breast lumps, breast pain,

## 2025-08-23 DIAGNOSIS — F32.1 MODERATE MAJOR DEPRESSION (HCC): ICD-10-CM

## 2025-08-24 RX ORDER — BUPROPION HYDROCHLORIDE 100 MG/1
100 TABLET, EXTENDED RELEASE ORAL DAILY
Qty: 90 TABLET | Refills: 0 | Status: SHIPPED | OUTPATIENT
Start: 2025-08-24 | End: 2026-02-20

## 2025-08-28 ENCOUNTER — APPOINTMENT (OUTPATIENT)
Dept: CT IMAGING | Age: 65
End: 2025-08-28
Payer: MEDICARE

## 2025-08-28 ENCOUNTER — HOSPITAL ENCOUNTER (EMERGENCY)
Age: 65
Discharge: HOME | End: 2025-08-28
Payer: MEDICARE

## 2025-08-28 VITALS
TEMPERATURE: 98 F | WEIGHT: 220 LBS | HEIGHT: 65 IN | DIASTOLIC BLOOD PRESSURE: 66 MMHG | BODY MASS INDEX: 36.65 KG/M2 | OXYGEN SATURATION: 93 % | HEART RATE: 98 BPM | SYSTOLIC BLOOD PRESSURE: 143 MMHG | RESPIRATION RATE: 17 BRPM

## 2025-08-28 DIAGNOSIS — K59.00 CONSTIPATION, UNSPECIFIED CONSTIPATION TYPE: Primary | ICD-10-CM

## 2025-08-28 DIAGNOSIS — K64.8 INTERNAL HEMORRHOIDS: ICD-10-CM

## 2025-08-28 LAB
ALBUMIN SERPL-MCNC: 3.9 G/DL (ref 3.5–5.2)
ALP SERPL-CCNC: 74 U/L (ref 35–104)
ALT SERPL-CCNC: 6 U/L (ref 10–35)
ANION GAP SERPL CALCULATED.3IONS-SCNC: 18 MMOL/L (ref 8–16)
AST SERPL-CCNC: 10 U/L (ref 10–35)
BASOPHILS # BLD: 0.1 K/UL (ref 0–0.2)
BASOPHILS NFR BLD: 0.5 % (ref 0–1)
BILIRUB SERPL-MCNC: 0.4 MG/DL (ref 0.2–1.2)
BUN SERPL-MCNC: 44 MG/DL (ref 8–23)
CALCIUM SERPL-MCNC: 10.5 MG/DL (ref 8.8–10.2)
CHLORIDE SERPL-SCNC: 85 MMOL/L (ref 98–107)
CO2 SERPL-SCNC: 27 MMOL/L (ref 22–29)
CREAT SERPL-MCNC: 7.3 MG/DL (ref 0.5–0.9)
EOSINOPHIL # BLD: 0.1 K/UL (ref 0–0.6)
EOSINOPHIL NFR BLD: 1.2 % (ref 0–5)
ERYTHROCYTE [DISTWIDTH] IN BLOOD BY AUTOMATED COUNT: 16.6 % (ref 11.5–14.5)
GLUCOSE SERPL-MCNC: 173 MG/DL (ref 70–99)
HCT VFR BLD AUTO: 36.4 % (ref 37–47)
HGB BLD-MCNC: 11.5 G/DL (ref 12–16)
IMM GRANULOCYTES # BLD: 0.1 K/UL
LYMPHOCYTES # BLD: 0.9 K/UL (ref 1.1–4.5)
LYMPHOCYTES NFR BLD: 9.9 % (ref 20–40)
MCH RBC QN AUTO: 31.2 PG (ref 27–31)
MCHC RBC AUTO-ENTMCNC: 31.6 G/DL (ref 33–37)
MCV RBC AUTO: 98.6 FL (ref 81–99)
MONOCYTES # BLD: 0.7 K/UL (ref 0–0.9)
MONOCYTES NFR BLD: 7.6 % (ref 0–10)
NEUTROPHILS # BLD: 7.5 K/UL (ref 1.5–7.5)
NEUTS SEG NFR BLD: 80.2 % (ref 50–65)
PLATELET # BLD AUTO: 255 K/UL (ref 130–400)
PMV BLD AUTO: 9.1 FL (ref 9.4–12.3)
POTASSIUM SERPL-SCNC: 4 MMOL/L (ref 3.5–5.1)
PROT SERPL-MCNC: 7.8 G/DL (ref 6.4–8.3)
RBC # BLD AUTO: 3.69 M/UL (ref 4.2–5.4)
SODIUM SERPL-SCNC: 130 MMOL/L (ref 136–145)
WBC # BLD AUTO: 9.3 K/UL (ref 4.8–10.8)

## 2025-08-28 PROCEDURE — 2580000003 HC RX 258: Performed by: NURSE PRACTITIONER

## 2025-08-28 PROCEDURE — 36415 COLL VENOUS BLD VENIPUNCTURE: CPT

## 2025-08-28 PROCEDURE — 99284 EMERGENCY DEPT VISIT MOD MDM: CPT

## 2025-08-28 PROCEDURE — 80053 COMPREHEN METABOLIC PANEL: CPT

## 2025-08-28 PROCEDURE — 74176 CT ABD & PELVIS W/O CONTRAST: CPT

## 2025-08-28 PROCEDURE — 85025 COMPLETE CBC W/AUTO DIFF WBC: CPT

## 2025-08-28 RX ORDER — 0.9 % SODIUM CHLORIDE 0.9 %
500 INTRAVENOUS SOLUTION INTRAVENOUS ONCE
Status: COMPLETED | OUTPATIENT
Start: 2025-08-28 | End: 2025-08-28

## 2025-08-28 RX ADMIN — SODIUM CHLORIDE 500 ML: 0.9 INJECTION, SOLUTION INTRAVENOUS at 10:09

## 2025-08-28 ASSESSMENT — ENCOUNTER SYMPTOMS
VOMITING: 0
CONSTIPATION: 1
ABDOMINAL PAIN: 0
DIARRHEA: 0
BLOOD IN STOOL: 0
ABDOMINAL DISTENTION: 0
NAUSEA: 1
RESPIRATORY NEGATIVE: 1

## (undated) DEVICE — VIVASIGHT 2 DLT KIT 37 FR - LEFT: Brand: VIVASIGHT™ 2 DLT KIT 37 FR - LEFT

## (undated) DEVICE — CABL BIPOL MEGADYNE 12FT DISP

## (undated) DEVICE — Device

## (undated) DEVICE — BNDG ELAS ECON W/CLIP 3IN 5YD LF STRL

## (undated) DEVICE — GLV SURG BIOGEL M LTX PF 8

## (undated) DEVICE — SUTURE ETHLN SZ 3-0 L18IN NONABSORBABLE BLK FS-1 L24MM 3/8 663H

## (undated) DEVICE — SURGICAL PROCEDURE PACK VASC LOURDES HOSP

## (undated) DEVICE — MEDIA CONTRAST INJ VISIPAQUE 150ML 320MG

## (undated) DEVICE — DECANTER FLD 9IN ST BG FOR ASEP TRNSF OF FLD

## (undated) DEVICE — CLIP INT SM WIDE RED TI TRNSVRS GRV CHEVRON SHP W/ PRECIS

## (undated) DEVICE — TBG SMPL FLTR LINE NASL 02/C02 A/ BX/100

## (undated) DEVICE — THE CHANNEL CLEANING BRUSH IS A NYLON FLEXI BRUSH ATTACHED TO A FLEXIBLE PLASTIC SHEATH DESIGNED TO SAFELY REMOVE DEBRIS FROM FLEXIBLE ENDOSCOPES.

## (undated) DEVICE — SUTURE PROL SZ 6-0 L24IN NONABSORBABLE BLU L9.3MM BV-1 3/8 8805H

## (undated) DEVICE — DISPOSABLE TOURNIQUET CUFF 24"X4", 1-LINE, YELLOW, STERILE, 1EA/PK, 10PK/CS: Brand: ASP MEDICAL

## (undated) DEVICE — SUTURE VCRL SZ 4-0 L18IN ABSRB UD VCRL POLYGLACTIN 910 COAT J109T

## (undated) DEVICE — CVR BRD ARM 13X30

## (undated) DEVICE — SOLUTION IV 250ML 0.9% SOD CHL PH 5 INJ USP VIAFLX PLAS

## (undated) DEVICE — GOWN,PREVENTION PLUS,XL,ST,24/CS: Brand: MEDLINE

## (undated) DEVICE — COVER,TABLE,44X90,STERILE: Brand: MEDLINE

## (undated) DEVICE — TOWEL,OR,DSP,ST,BLUE,DLX,4/PK,20PK/CS: Brand: MEDLINE

## (undated) DEVICE — SENSR O2 OXIMAX FNGR A/ 18IN NONSTR

## (undated) DEVICE — CUFF,BP,DISP,1 TUBE,ADULT,HP: Brand: MEDLINE

## (undated) DEVICE — RETRACTOR TISS SFT DISP M

## (undated) DEVICE — SUTURE VCRL SZ 3-0 L18IN ABSRB UD W/O NDL POLYGLACTIN 910 J110T

## (undated) DEVICE — ULTRACLEAN ACCESSORY ELECTRODE 1" (2.54 CM) COATED BLADE: Brand: ULTRACLEAN

## (undated) DEVICE — THE DISPOSABLE ROTH NET FOREIGN BODY STANDARD RETRIEVAL DEVICE IS USED IN THE ENDOSCOPIC RETRIEVAL OF FOREIGN BODY, FOOD BOLUS AND EXCISED TISSUE SUCH AS POLYPS.: Brand: ROTH NET

## (undated) DEVICE — CAP CONN RED

## (undated) DEVICE — ADHESIVE SKIN CLOSURE WND 8.661X1.5 IN 22 CM LIQUIBAND SECUR

## (undated) DEVICE — DRSNG GZ CURAD XEROFORM NONADHS 5X9IN STRL

## (undated) DEVICE — ANGIOGRAPHY PK

## (undated) DEVICE — SNAR POLYP SENSATION MICRO OVL 13 240X40

## (undated) DEVICE — POSITIONER HRT NS STARFISH

## (undated) DEVICE — MSK O2 MD CONCENTR A/ LF 7FT 1P/U

## (undated) DEVICE — 4-PORT MANIFOLD: Brand: NEPTUNE 2

## (undated) DEVICE — CONMED SCOPE SAVER BITE BLOCK, 20X27 MM: Brand: SCOPE SAVER

## (undated) DEVICE — YANKAUER,BULB TIP WITH VENT: Brand: ARGYLE

## (undated) DEVICE — Device: Brand: NOMOLINE™ LH ADULT NASAL CO2 CANNULA WITH O2 4M

## (undated) DEVICE — SOLUTION IV IRRIG POUR BRL 0.9% SODIUM CHL 2F7124

## (undated) DEVICE — GLOVE SURG SZ 65 L12IN FNGR THK79MIL GRN LTX FREE

## (undated) DEVICE — SHEET,T,THYROID,STERILE: Brand: MEDLINE

## (undated) DEVICE — SUTURE PERMAHAND SZ 2-0 L30IN NONABSORBABLE BLK SH L26MM C016D

## (undated) DEVICE — CYSTO/BLADDER IRRIGATION SET, REGULATING CLAMP

## (undated) DEVICE — Device: Brand: DEFENDO AIR/WATER/SUCTION AND BIOPSY VALVE

## (undated) DEVICE — ADHESIVE SKIN CLSR 0.7ML TOP DERMBND ADV

## (undated) DEVICE — BLANKET WRM W29.9XL79.1IN UP BODY FORC AIR MISTRAL-AIR

## (undated) DEVICE — SUTURE PROL SZ 5-0 L36IN NONABSORBABLE BLU L17MM RB-1 1/2 8556H

## (undated) DEVICE — SOLUTION IV 500ML 0.9% SOD CHL PH 5 INJ USP VIAFLX PLAS

## (undated) DEVICE — SUTURE NONABSORBABLE MONOFILAMENT 6-0 RB-2 4X30 IN PROLENE M8711

## (undated) DEVICE — SPNG GZ STRL 2S 4X4 12PLY

## (undated) DEVICE — SOLUTION IV 1000ML 0.9% SOD CHL PH 5 INJ USP VIAFLX PLAS

## (undated) DEVICE — COVER TRANSDUCER TELESCOPICALLY FOLDED 3.5X36 IN CIV-FLEX

## (undated) DEVICE — INTENDED FOR TISSUE SEPARATION, AND OTHER PROCEDURES THAT REQUIRE A SHARP SURGICAL BLADE TO PUNCTURE OR CUT.: Brand: BARD-PARKER ® STAINLESS STEEL BLADES

## (undated) DEVICE — GLOVE SURG SZ 8 L12IN FNGR THK79MIL GRN LTX FREE

## (undated) DEVICE — PK EXTRM 30

## (undated) DEVICE — C-ARM: Brand: UNBRANDED

## (undated) DEVICE — THE SINGLE USE ETRAP – POLYP TRAP IS USED FOR SUCTION RETRIEVAL OF ENDOSCOPICALLY REMOVED POLYPS.: Brand: ETRAP

## (undated) DEVICE — PROVE COVER: Brand: UNBRANDED

## (undated) DEVICE — CVR HNDL LIGHT RIGID

## (undated) DEVICE — TIP COVER ACCESSORY

## (undated) DEVICE — SUTURE VCRL SZ 3-0 L27IN ABSRB UD L26MM SH 1/2 CIR J416H

## (undated) DEVICE — DRAIN SURG SGL COLL PT TB FOR ATS BG OASIS

## (undated) DEVICE — DEVICE VASC CLSR 5FR GRY W/ INTEGR SEAL 10ML LOK SYR

## (undated) DEVICE — DRAPE SHEET: Brand: UNBRANDED

## (undated) DEVICE — CATHETER KIT 5 FR 21 GAX7 CM MICROINTRODUCER GUIDEWIRE STIFF

## (undated) DEVICE — GLV SURG DERMASSURE GRN LF PF 8.0

## (undated) DEVICE — TRY PREP SCRB VAG PVP

## (undated) DEVICE — COVER US PRB W15XL120CM W/ GEL RUBBERBAND TAPE STRP FLD GEN

## (undated) DEVICE — GLIDEPATH HEMODIALYSIS CATH, ST, DL, 14.5 FR. 23CM: Brand: GLIDEPATH LONG-TERM HEMODIALYSIS CATHETER WITH PRELOADED STYLET

## (undated) DEVICE — GLIDESHEATH SS KIT HYDROPHILIC COATED INTRODUCER SHEATH: Brand: GLIDESHEATH

## (undated) DEVICE — SUT ETHLN 4/0 FS2 18IN 662H

## (undated) DEVICE — ULTRACLEAN ACCESSORY ELECTRODE 4" (10.16 CM) COATED BLADE WITH EXTENDED INSULATION: Brand: ULTRACLEAN

## (undated) DEVICE — SHEET,DRAPE,53X77,STERILE: Brand: MEDLINE

## (undated) DEVICE — VESSEL SHUNT 1.75MM TAPERED TIP, 12MM SHAFT
Type: IMPLANTABLE DEVICE | Site: HEART | Status: NON-FUNCTIONAL
Brand: SOF-FLO ATRAUMATIC CORONARY ARTERY SHUNT
Removed: 2024-11-08

## (undated) DEVICE — GLOVE SURG SZ 7 L12IN FNGR THK79MIL GRN LTX FREE

## (undated) DEVICE — ST TB EXT STANDARDBORE 30IN

## (undated) DEVICE — GLV SURG TRIUMPH PF LTX 7.5 STRL

## (undated) DEVICE — CLIP LIG M BLU TI HRT SHP WIRE HORZ 180 PER BX

## (undated) DEVICE — FRCP BIOP ENDO CAPTURAPRO SPK SERR 2.8MM 230CM

## (undated) DEVICE — SUTURE ETHLN SZ 2-0 L30IN NONABSORBABLE BLK L36MM FSLX 3/8 1674H

## (undated) DEVICE — SYSTEM GWIRE L260CM DIA0035IN STD STEER HYDROPHOBIC STR TIP

## (undated) DEVICE — TOWEL,OR,DSP,ST,BLUE,DLX,10/PK,8PK/CS: Brand: MEDLINE

## (undated) DEVICE — GUIDEWIRE VASC 0.018 IN 40 CM SFT TIP NIT TUNGSTEN VSI

## (undated) DEVICE — SUTURE VICRYL SZ 0 L27IN ABSRB VLT L48MM CTX 1/2 CIR TAPR PNT J364H

## (undated) DEVICE — PINNACLE INTRODUCER SHEATH: Brand: PINNACLE

## (undated) DEVICE — CONQUEST® PTA BALLOON DILATATION CATHETER 10 MM X 40 MM, 75 CM CATHETER: Brand: CONQUEST®

## (undated) DEVICE — APPLICATOR LAP 35 CM 2 RIGID VISTASEAL

## (undated) DEVICE — DRAPE SLUSH DISC W44XL66IN ST FOR RND BSIN HUSH SLUSH SYS

## (undated) DEVICE — RADIFOCUS GLIDEWIRE: Brand: GLIDEWIRE

## (undated) DEVICE — GUIDEWIRE VASC L260CM DIA0038IN TIP L3MM PTFE J TIP FIX COR

## (undated) DEVICE — TUBE ET 7.5MM NSL ORAL BASIC CUF INTMED MURPHY EYE RADPQ

## (undated) DEVICE — SUTURE MNCRYL 0 UNDYED CT1 Y496H

## (undated) DEVICE — COVER LT HNDL PLAS RIG 2 PER PK

## (undated) DEVICE — AGENT HEMOSTATIC SURGIFLOW MATRIX KIT W/THROMBIN

## (undated) DEVICE — AMBU AURA-I U SIZE 4, DISPOSABLE LARYNGEAL MASK: Brand: AURA-I

## (undated) DEVICE — GLIDESHEATH SLENDER STAINLESS STEEL KIT: Brand: GLIDESHEATH SLENDER

## (undated) DEVICE — PAD,PREPPING,CUFFED,24X48,7",NONSTERILE: Brand: MEDLINE

## (undated) DEVICE — KIT ANGIO W/ AT P65 PREM HND CTRL FOR CNTRST DEL ANGIOTOUCH

## (undated) DEVICE — SYR LL TP 10ML STRL

## (undated) DEVICE — SUTURE VICRYL + SZ 2-0 L36IN ABSRB UD L36MM CT-1 1/2 CIR VCP945H

## (undated) DEVICE — BIPOLAR CAUTERY CORD

## (undated) DEVICE — DRAPE KEYBOARD W26XL36IN ADH

## (undated) DEVICE — TUBING, SUCTION, 1/4" X 12', STRAIGHT: Brand: MEDLINE

## (undated) DEVICE — ENDOGATOR AUXILIARY WATER JET CONNECTOR: Brand: ENDOGATOR

## (undated) DEVICE — PERCUTANEOUS ENTRY THINWALL NEEDLE  ONE-PART: Brand: COOK

## (undated) DEVICE — NG KIT, 21 GA, 10 PACK: Brand: SITE-RITE

## (undated) DEVICE — BAND COMPR L24CM REG CLR PLAS HEMSTAT EXT HK AND LOOP RETEN

## (undated) DEVICE — GOWN,NON-REINFORCED,SIRUS,SET IN SLV,XXL: Brand: MEDLINE

## (undated) DEVICE — GLOVE SURG 7.5 PF POLYMER WHT STRL SIGN LTX ESSENTIAL LTX

## (undated) DEVICE — BAPTIST TURNOVER KIT: Brand: MEDLINE INDUSTRIES, INC.

## (undated) DEVICE — OPEN HEART ROBO: Brand: MEDLINE INDUSTRIES, INC.

## (undated) DEVICE — LARYNGOSCOPE BLDE MAC HNDL M SZ 35 ST CURAPLEX CURAVIEW LED

## (undated) DEVICE — KIT SHTH INTRO 9FR L10CM PERC INTEGR HEMSTAS VLV SIDEPRT

## (undated) DEVICE — SUTURE ABSORBABLE MONOFILAMENT 3-0 SH 27 IN UD PDS + PDP416H

## (undated) DEVICE — PACK,UNIVERSAL,NO GOWNS: Brand: MEDLINE

## (undated) DEVICE — SEAL

## (undated) DEVICE — PACK,SET UP,NO DRAPES: Brand: MEDLINE

## (undated) DEVICE — PATIENT RETURN ELECTRODE, SINGLE-USE, CONTACT QUALITY MONITORING, ADULT, WITH 9FT CORD, FOR PATIENTS WEIGING OVER 33LBS. (15KG): Brand: MEGADYNE

## (undated) DEVICE — DRAIN SURG L3/8-1/2IN DIA3/16IN SIL CARD CONN 1:1 BLAK

## (undated) DEVICE — 1016 S-DRAPE IRRIG POUCH 10/BOX: Brand: STERI-DRAPE™

## (undated) DEVICE — MINOR CDS: Brand: MEDLINE INDUSTRIES, INC.

## (undated) DEVICE — 3M™ STERI-DRAPE™ INSTRUMENT POUCH 1018: Brand: STERI-DRAPE™

## (undated) DEVICE — ACCESSORY TOWEL PACK: Brand: MEDLINE INDUSTRIES, INC.

## (undated) DEVICE — CHLORAPREP 26ML ORANGE

## (undated) DEVICE — FRCP BX RADJAW4 NDL 2.8 240 STD OG

## (undated) DEVICE — PAD,ARMBOARD,CONV,FOAM,2X8X20",12PR/CS: Brand: MEDLINE

## (undated) DEVICE — DRSNG TELFA PAD NONADH STR 1S 3X8IN

## (undated) DEVICE — GAUZE,SPONGE,4"X4",16PLY,XRAY,STRL,LF: Brand: MEDLINE

## (undated) DEVICE — GLIDESHEATH BASIC HYDROPHILIC COATED INTRODUCER SHEATH: Brand: GLIDESHEATH

## (undated) DEVICE — BLANKET WRM W40.2XL55.9IN IORT LO BODY + MISTRAL AIR

## (undated) DEVICE — TUBING, SUCTION, 1/4" X 20', STRAIGHT: Brand: MEDLINE INDUSTRIES, INC.

## (undated) DEVICE — COLUMN DRAPE

## (undated) DEVICE — PAD SANI MAXI W/ADHS SNG WRP 11IN

## (undated) DEVICE — STABILIZER TISS BEAT HRT OCTPS NUVO

## (undated) DEVICE — DRIVER POWER 2 DRIVE DISP

## (undated) DEVICE — KIT MFLD ISOLATN NACL CNTRST PRT TBNG SPIK W/ PRSS TRNSDUC

## (undated) DEVICE — MONOPOLAR CAUTERY CORD

## (undated) DEVICE — ARM DRAPE

## (undated) DEVICE — PK TURNOVER RM ADV

## (undated) DEVICE — NEEDLE HYPO 18GA L1.5IN PNK POLYPR HUB S STL REG BVL STR

## (undated) DEVICE — GOWN, ORBIS, XLNG/XXLARGE, STRL: Brand: MEDLINE

## (undated) DEVICE — SYRINGE MED 10ML LUERLOCK TIP W/O SFTY DISP

## (undated) DEVICE — GLOVE SURG SZ 85 L12IN FNGR THK94MIL TRNSLUC YEL LTX

## (undated) DEVICE — BLADE GLIDESCOPE LOPRO SPECTRUM S3

## (undated) DEVICE — IMMOBILIZER SLING: Brand: DEROYAL

## (undated) DEVICE — GOWN,SIRUS,NON REINFRCD,LARGE,SET IN SL: Brand: MEDLINE

## (undated) DEVICE — TUBING O2 SM ORG STRL

## (undated) DEVICE — MASK,OXYGEN,MED CONC,ADLT,7' TUB, UC: Brand: PENDING

## (undated) DEVICE — SNAR POLYP SENSATION JUMBO OVL 30 240X5

## (undated) DEVICE — RADIFOCUS OPTITORQUE ANGIOGRAPHIC CATHETER: Brand: OPTITORQUE

## (undated) DEVICE — KIT COR DIAG CATH ANGIO 5FR CURVES JL 4.0 100CM JR 4.0

## (undated) DEVICE — PENCIL BTTN S S CAUT TIP W HOLSTER 25 50

## (undated) DEVICE — SUTURE PROL SZ 7-0 L24IN NONABSORBABLE BLU L9.3MM BV-1 3/8 M8702

## (undated) DEVICE — INFLATION DEVICE: Brand: ENCORE™ 26

## (undated) DEVICE — SEALANT TISS 10 ML FIBRIN VISTASEAL

## (undated) DEVICE — SUTURE NONABSORBABLE MONOFILAMENT 6-0 BV-1 1X30 IN PROLENE 8709H

## (undated) DEVICE — KIT CATH 18GA L6IN FEM ART LN W/ INTEGR SUT WNG 0.25X17

## (undated) DEVICE — DRAIN,WOUND,ROUND,24FR,5/16",FULL-FLUTED: Brand: MEDLINE

## (undated) DEVICE — SWAN-GANZ CCOMBO V THERMODILUTION CATHETER: Brand: SWAN-GANZ CCOMBO V

## (undated) DEVICE — SUTURE MONOCRYL SZ 4-0 L18IN ABSRB UD L19MM PS-2 3/8 CIR PRIM Y496G

## (undated) DEVICE — KIT BLWR MISTER 5P 15L W/ TBNG SET IRRIG MIST TO IMPROVE